# Patient Record
Sex: MALE | Race: WHITE | NOT HISPANIC OR LATINO | URBAN - METROPOLITAN AREA
[De-identification: names, ages, dates, MRNs, and addresses within clinical notes are randomized per-mention and may not be internally consistent; named-entity substitution may affect disease eponyms.]

---

## 2019-05-07 ENCOUNTER — INPATIENT (INPATIENT)
Facility: HOSPITAL | Age: 69
LOS: 16 days | Discharge: HOME | DRG: 228 | End: 2019-05-24
Attending: INTERNAL MEDICINE | Admitting: GENERAL ACUTE CARE HOSPITAL
Payer: MEDICARE

## 2019-05-07 VITALS
RESPIRATION RATE: 18 BRPM | SYSTOLIC BLOOD PRESSURE: 160 MMHG | TEMPERATURE: 97 F | WEIGHT: 207.01 LBS | HEART RATE: 64 BPM | OXYGEN SATURATION: 94 % | DIASTOLIC BLOOD PRESSURE: 82 MMHG | HEIGHT: 72 IN

## 2019-05-07 DIAGNOSIS — C85.90 NON-HODGKIN LYMPHOMA, UNSPECIFIED, UNSPECIFIED SITE: Chronic | ICD-10-CM

## 2019-05-07 LAB
ALBUMIN SERPL ELPH-MCNC: 3.6 G/DL — SIGNIFICANT CHANGE UP (ref 3.3–5)
ALP SERPL-CCNC: 139 U/L — HIGH (ref 40–120)
ALT FLD-CCNC: 14 U/L — SIGNIFICANT CHANGE UP (ref 10–45)
AMMONIA BLD-MCNC: 47 UMOL/L — SIGNIFICANT CHANGE UP (ref 11–55)
ANION GAP SERPL CALC-SCNC: 16 MMOL/L — SIGNIFICANT CHANGE UP (ref 5–17)
APAP SERPL-MCNC: <15 UG/ML — SIGNIFICANT CHANGE UP (ref 10–30)
AST SERPL-CCNC: 36 U/L — SIGNIFICANT CHANGE UP (ref 10–40)
BASOPHILS # BLD AUTO: 0 K/UL — SIGNIFICANT CHANGE UP (ref 0–0.2)
BILIRUB SERPL-MCNC: 0.9 MG/DL — SIGNIFICANT CHANGE UP (ref 0.2–1.2)
BUN SERPL-MCNC: 19 MG/DL — SIGNIFICANT CHANGE UP (ref 7–23)
CALCIUM SERPL-MCNC: 9.4 MG/DL — SIGNIFICANT CHANGE UP (ref 8.4–10.5)
CHLORIDE SERPL-SCNC: 108 MMOL/L — SIGNIFICANT CHANGE UP (ref 96–108)
CO2 SERPL-SCNC: 19 MMOL/L — LOW (ref 22–31)
CREAT SERPL-MCNC: 1.5 MG/DL — HIGH (ref 0.5–1.3)
EOSINOPHIL # BLD AUTO: 0.1 K/UL — SIGNIFICANT CHANGE UP (ref 0–0.5)
ETHANOL SERPL-MCNC: SIGNIFICANT CHANGE UP MG/DL (ref 0–10)
GAS PNL BLDV: SIGNIFICANT CHANGE UP
GLUCOSE SERPL-MCNC: 138 MG/DL — HIGH (ref 70–99)
HCT VFR BLD CALC: 40.3 % — SIGNIFICANT CHANGE UP (ref 39–50)
HGB BLD-MCNC: 12.7 G/DL — LOW (ref 13–17)
LYMPHOCYTES # BLD AUTO: 1.1 K/UL — SIGNIFICANT CHANGE UP (ref 1–3.3)
LYMPHOCYTES # BLD AUTO: 11 % — LOW (ref 13–44)
MAGNESIUM SERPL-MCNC: 1.9 MG/DL — SIGNIFICANT CHANGE UP (ref 1.6–2.6)
MCHC RBC-ENTMCNC: 27.9 PG — SIGNIFICANT CHANGE UP (ref 27–34)
MCHC RBC-ENTMCNC: 31.5 GM/DL — LOW (ref 32–36)
MCV RBC AUTO: 88.7 FL — SIGNIFICANT CHANGE UP (ref 80–100)
MONOCYTES # BLD AUTO: 0.6 K/UL — SIGNIFICANT CHANGE UP (ref 0–0.9)
MONOCYTES NFR BLD AUTO: 4 % — SIGNIFICANT CHANGE UP (ref 2–14)
NEUTROPHILS # BLD AUTO: 7.9 K/UL — HIGH (ref 1.8–7.4)
NEUTROPHILS NFR BLD AUTO: 85 % — HIGH (ref 43–77)
PHOSPHATE SERPL-MCNC: 3.8 MG/DL — SIGNIFICANT CHANGE UP (ref 2.5–4.5)
PLATELET # BLD AUTO: 178 K/UL — SIGNIFICANT CHANGE UP (ref 150–400)
POTASSIUM SERPL-MCNC: 4.9 MMOL/L — SIGNIFICANT CHANGE UP (ref 3.5–5.3)
POTASSIUM SERPL-SCNC: 4.9 MMOL/L — SIGNIFICANT CHANGE UP (ref 3.5–5.3)
PROT SERPL-MCNC: 7.3 G/DL — SIGNIFICANT CHANGE UP (ref 6–8.3)
RBC # BLD: 4.55 M/UL — SIGNIFICANT CHANGE UP (ref 4.2–5.8)
RBC # FLD: 15.4 % — HIGH (ref 10.3–14.5)
SALICYLATES SERPL-MCNC: <2 MG/DL — LOW (ref 15–30)
SODIUM SERPL-SCNC: 143 MMOL/L — SIGNIFICANT CHANGE UP (ref 135–145)
TROPONIN T, HIGH SENSITIVITY RESULT: 56 NG/L — HIGH (ref 0–51)
WBC # BLD: 9.6 K/UL — SIGNIFICANT CHANGE UP (ref 3.8–10.5)
WBC # FLD AUTO: 9.6 K/UL — SIGNIFICANT CHANGE UP (ref 3.8–10.5)

## 2019-05-07 PROCEDURE — 99285 EMERGENCY DEPT VISIT HI MDM: CPT

## 2019-05-07 PROCEDURE — 71046 X-RAY EXAM CHEST 2 VIEWS: CPT | Mod: 26

## 2019-05-07 PROCEDURE — 70450 CT HEAD/BRAIN W/O DYE: CPT | Mod: 26

## 2019-05-07 RX ORDER — THIAMINE MONONITRATE (VIT B1) 100 MG
500 TABLET ORAL ONCE
Qty: 0 | Refills: 0 | Status: COMPLETED | OUTPATIENT
Start: 2019-05-07 | End: 2019-05-07

## 2019-05-07 RX ORDER — MORPHINE SULFATE 50 MG/1
4 CAPSULE, EXTENDED RELEASE ORAL ONCE
Qty: 0 | Refills: 0 | Status: DISCONTINUED | OUTPATIENT
Start: 2019-05-07 | End: 2019-05-07

## 2019-05-07 RX ORDER — LABETALOL HCL 100 MG
200 TABLET ORAL ONCE
Qty: 0 | Refills: 0 | Status: DISCONTINUED | OUTPATIENT
Start: 2019-05-07 | End: 2019-05-07

## 2019-05-07 RX ADMIN — Medication 105 MILLIGRAM(S): at 23:42

## 2019-05-07 NOTE — ED PROVIDER NOTE - PMH
DM type 2 (diabetes mellitus, type 2)    Essential hypertension    Moderate mitral regurgitation    Non-Hodgkins Lymphoma    Pleural effusion    Rhinitis, allergic    Systolic heart failure

## 2019-05-07 NOTE — ED PROVIDER NOTE - PHYSICAL EXAMINATION
Gen: AAOx1 - unable to state date or location - oriented to self, non-toxic  Head: NCAT  HEENT: EOMI, oral mucosa moist, normal conjunctiva  Lung: CTAB, no respiratory distress, no wheezes/rhonchi/rales B/L, speaking in full sentences  CV: RRR, no murmurs, rubs or gallops  Abd: soft, NTND, no guarding  MSK: no visible deformities  Neuro: No focal sensory or motor deficits  Skin: Warm, well perfused, no rash  Psych: normal affect.   ~Massiel Blevins M.D. Resident

## 2019-05-07 NOTE — ED PROVIDER NOTE - NS ED ROS FT
GENERAL: No fever or chills, EYES: no change in vision, HEENT: no trouble swallowing or speaking, CARDIAC: +chest pain, PULMONARY: no cough or SOB, GI: no abdominal pain, no nausea, no vomiting, no diarrhea or constipation, : No changes in urination, SKIN: no rashes, NEURO: no headache,  MSK: No joint pain ~Massiel Blevins M.D. Resident

## 2019-05-07 NOTE — ED PROVIDER NOTE - PROGRESS NOTE DETAILS
call placed to pmd's (dr burton) answering service. awaiting callback. spoke to dr crouch, on-call physician for [employer]. he was given brief signout on patient... states pt was seen by pmd a few weeks ago, had some confusion, but when seen today worse. was noted to have folate deficiency and anemia, but does not believe pt taking meds. unclear if alcohol hx or drug abuse hx. he agrees with workup thus far, will call back when labs and ct result. - Ed Berger MD Massiel Blevins M.D. Resident: Discussed pt with Dr. Loyd, will admit to telemetry and will see pt in morning, deferring heparin drip at this time Massiel Blevins M.D. Resident: Troponin 56-->57-->50, pt with Cr 1.50, discussed with Dr. Proctor, pt admitted to telemetry, no active chest pain

## 2019-05-07 NOTE — ED PROVIDER NOTE - ATTENDING CONTRIBUTION TO CARE
68M, pmh cardiomyopathy, DM II, non-hodgkins lymphoma (2004, relapse 2010), RBBB, biba from pmd's office with ekg changes, confusion. per EMS, pt more confused at PMD's office, which prompted visit to ED. pt unable to provide coherent history, repeatedly changing answers, but he denies any cp, sob, palpitations, lightheadedness, dizziness, or other complaints.    PE: NAD, NCAT, MMM, Trachea midline, Normal conjunctiva, lungs CTAB, S1/S2 RRR, Normal perfusion, 2+ radial pulses bilat, Abdomen Soft, NTND, No rebound/guarding, No LE edema, No deformity of extremities, No rashes,  No focal motor or sensory deficits. CN II-XII intact. Visual fields intact. EOMI, PERRLA. Facial sensation equal bilat. Smile and eye closure equal bilat. Hearing intact bilat. Palate elevation equal, tongue protrusion midline. Lateral head rotation equal bilat. 5/5 strength UE and LE bilat. Sensation grossly intact. No pronator drift. Normal finger to nose. Negative Romberg. Unable to tolerate tandem gait.    VS without sig abnormality. Pt appears well, but unable to provide any coherent history, persistently confabulating answers. Case d/w on-call physician for his pmd, who expressed concern for gradually worsening mental status and confusion. ddx includes but not limited to infection, hepatic encephalopathy, metabolic encephalopathy, cns lesion/bleed, also consider tox cause including wernicke encephalopathy, unclear if heavy etoh use in past. plan to obtain labs, ct head, cxr, ekg, urinalysis, tox screen, pt will require admission for further workup. - Ed Berger MD 68M, pmh cardiomyopathy, DM II, non-hodgkins lymphoma (2004, relapse 2010), RBBB, biba from pmd's office with ekg changes, confusion. per EMS, pt more confused at PMD's office, which prompted visit to ED. pt unable to provide coherent history, repeatedly changing answers, but he denies any cp, sob, palpitations, lightheadedness, dizziness, or other complaints. pt denies current etoh use, states stopped drinking in past but denies hx heavy etoh use. states smokes one cigarette a day. denies drug use.    PE: NAD, NCAT, MMM, Trachea midline, Normal conjunctiva, lungs CTAB, S1/S2 RRR, Normal perfusion, 2+ radial pulses bilat, Abdomen Soft, NTND, No rebound/guarding, No LE edema, No deformity of extremities, No rashes,  No focal motor or sensory deficits. CN II-XII intact. Visual fields intact. EOMI, PERRLA. Facial sensation equal bilat. Smile and eye closure equal bilat. Hearing intact bilat. Palate elevation equal, tongue protrusion midline. Lateral head rotation equal bilat. 5/5 strength UE and LE bilat. Sensation grossly intact. No pronator drift. Normal finger to nose. Negative Romberg. Unable to tolerate tandem gait.    VS without sig abnormality. Pt appears well, but unable to provide any coherent history, persistently confabulating answers. Case d/w on-call physician for his pmd, who expressed concern for gradually worsening mental status and confusion. ddx includes but not limited to infection, hepatic encephalopathy, metabolic encephalopathy, cns lesion/bleed, also consider tox cause including wernicke encephalopathy, unclear if heavy etoh use in past. will give thiamine for possible wernicke encephalopathy, as pt does have confusion and cannot perform tandem gait. plan to obtain labs, ct head, cxr, ekg, urinalysis, tox screen, pt will require admission for further workup. - Ed Berger MD

## 2019-05-07 NOTE — ED PROVIDER NOTE - CLINICAL SUMMARY MEDICAL DECISION MAKING FREE TEXT BOX
69 yo M PMHx cardiomyopathy, DM II, non-hodgkins lymphoma (2004, relapse 2010), RBBB, biba from pmd's office with ekg changes, confusion, DDx: CNS lesion, metabolic disturbance, wernicke's, will check labs, CTH, EKG, admit

## 2019-05-07 NOTE — ED PROVIDER NOTE - OBJECTIVE STATEMENT
67 yo M PMHx cardiomyopathy, DM II, non-hodgkins lymphoma (2004, relapse 2010), RBBB, biba from pmd's office with ekg changes, confusion. Pt unable to provide coherent timeline of events but states was feeling chest pain and SOB at PMD's office. These sx started around noon today. Denies fevers/chills, abd pain, N/V, dysuria.

## 2019-05-08 DIAGNOSIS — I44.2 ATRIOVENTRICULAR BLOCK, COMPLETE: ICD-10-CM

## 2019-05-08 DIAGNOSIS — R94.31 ABNORMAL ELECTROCARDIOGRAM [ECG] [EKG]: ICD-10-CM

## 2019-05-08 DIAGNOSIS — G93.40 ENCEPHALOPATHY, UNSPECIFIED: ICD-10-CM

## 2019-05-08 DIAGNOSIS — E11.22 TYPE 2 DIABETES MELLITUS WITH DIABETIC CHRONIC KIDNEY DISEASE: ICD-10-CM

## 2019-05-08 DIAGNOSIS — M1A.9XX0 CHRONIC GOUT, UNSPECIFIED, WITHOUT TOPHUS (TOPHI): ICD-10-CM

## 2019-05-08 DIAGNOSIS — I50.22 CHRONIC SYSTOLIC (CONGESTIVE) HEART FAILURE: ICD-10-CM

## 2019-05-08 DIAGNOSIS — R41.82 ALTERED MENTAL STATUS, UNSPECIFIED: ICD-10-CM

## 2019-05-08 DIAGNOSIS — C85.91 NON-HODGKIN LYMPHOMA, UNSPECIFIED, LYMPH NODES OF HEAD, FACE, AND NECK: ICD-10-CM

## 2019-05-08 DIAGNOSIS — I10 ESSENTIAL (PRIMARY) HYPERTENSION: ICD-10-CM

## 2019-05-08 LAB
ALBUMIN SERPL ELPH-MCNC: 3.1 G/DL — LOW (ref 3.3–5)
ALBUMIN SERPL ELPH-MCNC: 3.5 G/DL — SIGNIFICANT CHANGE UP (ref 3.3–5)
ALP SERPL-CCNC: 137 U/L — HIGH (ref 40–120)
ALP SERPL-CCNC: 143 U/L — HIGH (ref 40–120)
ALT FLD-CCNC: 10 U/L — SIGNIFICANT CHANGE UP (ref 10–45)
ALT FLD-CCNC: 15 U/L — SIGNIFICANT CHANGE UP (ref 10–45)
ANION GAP SERPL CALC-SCNC: 13 MMOL/L — SIGNIFICANT CHANGE UP (ref 5–17)
ANION GAP SERPL CALC-SCNC: 14 MMOL/L — SIGNIFICANT CHANGE UP (ref 5–17)
ANION GAP SERPL CALC-SCNC: 15 MMOL/L — SIGNIFICANT CHANGE UP (ref 5–17)
APPEARANCE UR: ABNORMAL
APTT BLD: 30 SEC — SIGNIFICANT CHANGE UP (ref 27.5–36.3)
AST SERPL-CCNC: 13 U/L — SIGNIFICANT CHANGE UP (ref 10–40)
AST SERPL-CCNC: 21 U/L — SIGNIFICANT CHANGE UP (ref 10–40)
BACTERIA # UR AUTO: NEGATIVE — SIGNIFICANT CHANGE UP
BASOPHILS # BLD AUTO: 0 K/UL — SIGNIFICANT CHANGE UP (ref 0–0.2)
BASOPHILS NFR BLD AUTO: 0 % — SIGNIFICANT CHANGE UP (ref 0–2)
BILIRUB SERPL-MCNC: 0.7 MG/DL — SIGNIFICANT CHANGE UP (ref 0.2–1.2)
BILIRUB SERPL-MCNC: 0.9 MG/DL — SIGNIFICANT CHANGE UP (ref 0.2–1.2)
BILIRUB UR-MCNC: ABNORMAL
BUN SERPL-MCNC: 18 MG/DL — SIGNIFICANT CHANGE UP (ref 7–23)
BUN SERPL-MCNC: 19 MG/DL — SIGNIFICANT CHANGE UP (ref 7–23)
BUN SERPL-MCNC: 26 MG/DL — HIGH (ref 7–23)
CALCIUM SERPL-MCNC: 8.8 MG/DL — SIGNIFICANT CHANGE UP (ref 8.4–10.5)
CALCIUM SERPL-MCNC: 9.1 MG/DL — SIGNIFICANT CHANGE UP (ref 8.4–10.5)
CALCIUM SERPL-MCNC: 9.3 MG/DL — SIGNIFICANT CHANGE UP (ref 8.4–10.5)
CHLORIDE SERPL-SCNC: 108 MMOL/L — SIGNIFICANT CHANGE UP (ref 96–108)
CHLORIDE SERPL-SCNC: 108 MMOL/L — SIGNIFICANT CHANGE UP (ref 96–108)
CHLORIDE SERPL-SCNC: 109 MMOL/L — HIGH (ref 96–108)
CO2 SERPL-SCNC: 20 MMOL/L — LOW (ref 22–31)
CO2 SERPL-SCNC: 22 MMOL/L — SIGNIFICANT CHANGE UP (ref 22–31)
CO2 SERPL-SCNC: 22 MMOL/L — SIGNIFICANT CHANGE UP (ref 22–31)
COLOR SPEC: YELLOW — SIGNIFICANT CHANGE UP
CREAT SERPL-MCNC: 1.5 MG/DL — HIGH (ref 0.5–1.3)
CREAT SERPL-MCNC: 1.56 MG/DL — HIGH (ref 0.5–1.3)
CREAT SERPL-MCNC: 1.75 MG/DL — HIGH (ref 0.5–1.3)
DIFF PNL FLD: NEGATIVE — SIGNIFICANT CHANGE UP
EOSINOPHIL # BLD AUTO: 0.1 K/UL — SIGNIFICANT CHANGE UP (ref 0–0.5)
EOSINOPHIL NFR BLD AUTO: 1.3 % — SIGNIFICANT CHANGE UP (ref 0–6)
EPI CELLS # UR: 1 /HPF — SIGNIFICANT CHANGE UP
FOLATE SERPL-MCNC: 13 NG/ML — SIGNIFICANT CHANGE UP
GLUCOSE BLDC GLUCOMTR-MCNC: 127 MG/DL — HIGH (ref 70–99)
GLUCOSE BLDC GLUCOMTR-MCNC: 146 MG/DL — HIGH (ref 70–99)
GLUCOSE BLDC GLUCOMTR-MCNC: 161 MG/DL — HIGH (ref 70–99)
GLUCOSE BLDC GLUCOMTR-MCNC: 91 MG/DL — SIGNIFICANT CHANGE UP (ref 70–99)
GLUCOSE SERPL-MCNC: 100 MG/DL — HIGH (ref 70–99)
GLUCOSE SERPL-MCNC: 113 MG/DL — HIGH (ref 70–99)
GLUCOSE SERPL-MCNC: 174 MG/DL — HIGH (ref 70–99)
GLUCOSE UR QL: NEGATIVE — SIGNIFICANT CHANGE UP
HCT VFR BLD CALC: 35.8 % — LOW (ref 39–50)
HCT VFR BLD CALC: 38 % — LOW (ref 39–50)
HGB BLD-MCNC: 11.3 G/DL — LOW (ref 13–17)
HGB BLD-MCNC: 12.3 G/DL — LOW (ref 13–17)
HYALINE CASTS # UR AUTO: 7 /LPF — HIGH (ref 0–2)
INR BLD: 1.58 RATIO — HIGH (ref 0.88–1.16)
IRON SATN MFR SERPL: 27 UG/DL — LOW (ref 45–165)
IRON SATN MFR SERPL: 9 % — LOW (ref 16–55)
KETONES UR-MCNC: NEGATIVE — SIGNIFICANT CHANGE UP
LACTATE SERPL-SCNC: 1.2 MMOL/L — SIGNIFICANT CHANGE UP (ref 0.7–2)
LACTATE SERPL-SCNC: 1.5 MMOL/L — SIGNIFICANT CHANGE UP (ref 0.7–2)
LEUKOCYTE ESTERASE UR-ACNC: NEGATIVE — SIGNIFICANT CHANGE UP
LYMPHOCYTES # BLD AUTO: 0.6 K/UL — LOW (ref 1–3.3)
LYMPHOCYTES # BLD AUTO: 9 % — LOW (ref 13–44)
MAGNESIUM SERPL-MCNC: 2 MG/DL — SIGNIFICANT CHANGE UP (ref 1.6–2.6)
MCHC RBC-ENTMCNC: 28.2 PG — SIGNIFICANT CHANGE UP (ref 27–34)
MCHC RBC-ENTMCNC: 28.9 PG — SIGNIFICANT CHANGE UP (ref 27–34)
MCHC RBC-ENTMCNC: 31.6 GM/DL — LOW (ref 32–36)
MCHC RBC-ENTMCNC: 32.3 GM/DL — SIGNIFICANT CHANGE UP (ref 32–36)
MCV RBC AUTO: 89.3 FL — SIGNIFICANT CHANGE UP (ref 80–100)
MCV RBC AUTO: 89.7 FL — SIGNIFICANT CHANGE UP (ref 80–100)
MONOCYTES # BLD AUTO: 0.3 K/UL — SIGNIFICANT CHANGE UP (ref 0–0.9)
MONOCYTES NFR BLD AUTO: 5.3 % — SIGNIFICANT CHANGE UP (ref 2–14)
NEUTROPHILS # BLD AUTO: 5.4 K/UL — SIGNIFICANT CHANGE UP (ref 1.8–7.4)
NEUTROPHILS NFR BLD AUTO: 84.4 % — HIGH (ref 43–77)
NITRITE UR-MCNC: NEGATIVE — SIGNIFICANT CHANGE UP
PCP SPEC-MCNC: SIGNIFICANT CHANGE UP
PH UR: 5.5 — SIGNIFICANT CHANGE UP (ref 5–8)
PHOSPHATE SERPL-MCNC: 4.3 MG/DL — SIGNIFICANT CHANGE UP (ref 2.5–4.5)
PLATELET # BLD AUTO: 160 K/UL — SIGNIFICANT CHANGE UP (ref 150–400)
PLATELET # BLD AUTO: 163 K/UL — SIGNIFICANT CHANGE UP (ref 150–400)
POTASSIUM SERPL-MCNC: 3.5 MMOL/L — SIGNIFICANT CHANGE UP (ref 3.5–5.3)
POTASSIUM SERPL-MCNC: 3.6 MMOL/L — SIGNIFICANT CHANGE UP (ref 3.5–5.3)
POTASSIUM SERPL-MCNC: 4.2 MMOL/L — SIGNIFICANT CHANGE UP (ref 3.5–5.3)
POTASSIUM SERPL-SCNC: 3.5 MMOL/L — SIGNIFICANT CHANGE UP (ref 3.5–5.3)
POTASSIUM SERPL-SCNC: 3.6 MMOL/L — SIGNIFICANT CHANGE UP (ref 3.5–5.3)
POTASSIUM SERPL-SCNC: 4.2 MMOL/L — SIGNIFICANT CHANGE UP (ref 3.5–5.3)
PROT SERPL-MCNC: 6.4 G/DL — SIGNIFICANT CHANGE UP (ref 6–8.3)
PROT SERPL-MCNC: 7.3 G/DL — SIGNIFICANT CHANGE UP (ref 6–8.3)
PROT UR-MCNC: ABNORMAL
PROTHROM AB SERPL-ACNC: 18.4 SEC — HIGH (ref 10–12.9)
RBC # BLD: 4.01 M/UL — LOW (ref 4.2–5.8)
RBC # BLD: 4.24 M/UL — SIGNIFICANT CHANGE UP (ref 4.2–5.8)
RBC # FLD: 15.2 % — HIGH (ref 10.3–14.5)
RBC # FLD: 15.4 % — HIGH (ref 10.3–14.5)
RBC CASTS # UR COMP ASSIST: 2 /HPF — SIGNIFICANT CHANGE UP (ref 0–4)
SODIUM SERPL-SCNC: 143 MMOL/L — SIGNIFICANT CHANGE UP (ref 135–145)
SODIUM SERPL-SCNC: 144 MMOL/L — SIGNIFICANT CHANGE UP (ref 135–145)
SODIUM SERPL-SCNC: 144 MMOL/L — SIGNIFICANT CHANGE UP (ref 135–145)
SP GR SPEC: 1.02 — SIGNIFICANT CHANGE UP (ref 1.01–1.02)
T PALLIDUM AB TITR SER: NEGATIVE — SIGNIFICANT CHANGE UP
TIBC SERPL-MCNC: 311 UG/DL — SIGNIFICANT CHANGE UP (ref 220–430)
TROPONIN T, HIGH SENSITIVITY RESULT: 50 NG/L — SIGNIFICANT CHANGE UP (ref 0–51)
TSH SERPL-MCNC: 1.08 UIU/ML — SIGNIFICANT CHANGE UP (ref 0.27–4.2)
UIBC SERPL-MCNC: 284 UG/DL — SIGNIFICANT CHANGE UP (ref 110–370)
UROBILINOGEN FLD QL: ABNORMAL
VIT B12 SERPL-MCNC: 625 PG/ML — SIGNIFICANT CHANGE UP (ref 232–1245)
WBC # BLD: 6.5 K/UL — SIGNIFICANT CHANGE UP (ref 3.8–10.5)
WBC # BLD: 7.5 K/UL — SIGNIFICANT CHANGE UP (ref 3.8–10.5)
WBC # FLD AUTO: 6.5 K/UL — SIGNIFICANT CHANGE UP (ref 3.8–10.5)
WBC # FLD AUTO: 7.5 K/UL — SIGNIFICANT CHANGE UP (ref 3.8–10.5)
WBC UR QL: 7 /HPF — HIGH (ref 0–5)

## 2019-05-08 PROCEDURE — 93306 TTE W/DOPPLER COMPLETE: CPT | Mod: 26

## 2019-05-08 PROCEDURE — 99223 1ST HOSP IP/OBS HIGH 75: CPT | Mod: GC

## 2019-05-08 PROCEDURE — 70551 MRI BRAIN STEM W/O DYE: CPT | Mod: 26

## 2019-05-08 PROCEDURE — 93010 ELECTROCARDIOGRAM REPORT: CPT

## 2019-05-08 PROCEDURE — 99232 SBSQ HOSP IP/OBS MODERATE 35: CPT

## 2019-05-08 PROCEDURE — 99223 1ST HOSP IP/OBS HIGH 75: CPT

## 2019-05-08 RX ORDER — ASPIRIN/CALCIUM CARB/MAGNESIUM 324 MG
81 TABLET ORAL DAILY
Qty: 0 | Refills: 0 | Status: DISCONTINUED | OUTPATIENT
Start: 2019-05-08 | End: 2019-05-10

## 2019-05-08 RX ORDER — HALOPERIDOL DECANOATE 100 MG/ML
5 INJECTION INTRAMUSCULAR ONCE
Qty: 0 | Refills: 0 | Status: COMPLETED | OUTPATIENT
Start: 2019-05-08 | End: 2019-05-08

## 2019-05-08 RX ORDER — ONDANSETRON 8 MG/1
8 TABLET, FILM COATED ORAL
Qty: 0 | Refills: 0 | Status: DISCONTINUED | OUTPATIENT
Start: 2019-05-08 | End: 2019-05-09

## 2019-05-08 RX ORDER — FOLIC ACID 0.8 MG
1 TABLET ORAL DAILY
Qty: 0 | Refills: 0 | Status: DISCONTINUED | OUTPATIENT
Start: 2019-05-08 | End: 2019-05-24

## 2019-05-08 RX ORDER — ALLOPURINOL 300 MG
300 TABLET ORAL DAILY
Qty: 0 | Refills: 0 | Status: DISCONTINUED | OUTPATIENT
Start: 2019-05-08 | End: 2019-05-24

## 2019-05-08 RX ORDER — METOPROLOL TARTRATE 50 MG
25 TABLET ORAL DAILY
Qty: 0 | Refills: 0 | Status: DISCONTINUED | OUTPATIENT
Start: 2019-05-08 | End: 2019-05-08

## 2019-05-08 RX ORDER — MAGNESIUM SULFATE 500 MG/ML
2 VIAL (ML) INJECTION ONCE
Qty: 0 | Refills: 0 | Status: COMPLETED | OUTPATIENT
Start: 2019-05-08 | End: 2019-05-08

## 2019-05-08 RX ORDER — DEXTROSE 50 % IN WATER 50 %
15 SYRINGE (ML) INTRAVENOUS ONCE
Qty: 0 | Refills: 0 | Status: DISCONTINUED | OUTPATIENT
Start: 2019-05-08 | End: 2019-05-24

## 2019-05-08 RX ORDER — FUROSEMIDE 40 MG
20 TABLET ORAL
Qty: 0 | Refills: 0 | Status: DISCONTINUED | OUTPATIENT
Start: 2019-05-08 | End: 2019-05-09

## 2019-05-08 RX ORDER — GLUCAGON INJECTION, SOLUTION 0.5 MG/.1ML
1 INJECTION, SOLUTION SUBCUTANEOUS ONCE
Qty: 0 | Refills: 0 | Status: DISCONTINUED | OUTPATIENT
Start: 2019-05-08 | End: 2019-05-24

## 2019-05-08 RX ORDER — DEXTROSE 50 % IN WATER 50 %
12.5 SYRINGE (ML) INTRAVENOUS ONCE
Qty: 0 | Refills: 0 | Status: DISCONTINUED | OUTPATIENT
Start: 2019-05-08 | End: 2019-05-24

## 2019-05-08 RX ORDER — SODIUM CHLORIDE 9 MG/ML
1000 INJECTION, SOLUTION INTRAVENOUS
Qty: 0 | Refills: 0 | Status: DISCONTINUED | OUTPATIENT
Start: 2019-05-08 | End: 2019-05-24

## 2019-05-08 RX ORDER — HEPARIN SODIUM 5000 [USP'U]/ML
5000 INJECTION INTRAVENOUS; SUBCUTANEOUS EVERY 12 HOURS
Qty: 0 | Refills: 0 | Status: DISCONTINUED | OUTPATIENT
Start: 2019-05-08 | End: 2019-05-12

## 2019-05-08 RX ORDER — LOSARTAN POTASSIUM 100 MG/1
50 TABLET, FILM COATED ORAL DAILY
Qty: 0 | Refills: 0 | Status: DISCONTINUED | OUTPATIENT
Start: 2019-05-08 | End: 2019-05-08

## 2019-05-08 RX ORDER — POTASSIUM CHLORIDE 20 MEQ
20 PACKET (EA) ORAL ONCE
Qty: 0 | Refills: 0 | Status: COMPLETED | OUTPATIENT
Start: 2019-05-08 | End: 2019-05-08

## 2019-05-08 RX ORDER — SPIRONOLACTONE 25 MG/1
25 TABLET, FILM COATED ORAL DAILY
Qty: 0 | Refills: 0 | Status: DISCONTINUED | OUTPATIENT
Start: 2019-05-08 | End: 2019-05-09

## 2019-05-08 RX ORDER — INSULIN LISPRO 100/ML
VIAL (ML) SUBCUTANEOUS
Qty: 0 | Refills: 0 | Status: DISCONTINUED | OUTPATIENT
Start: 2019-05-08 | End: 2019-05-24

## 2019-05-08 RX ADMIN — HEPARIN SODIUM 5000 UNIT(S): 5000 INJECTION INTRAVENOUS; SUBCUTANEOUS at 19:37

## 2019-05-08 RX ADMIN — LOSARTAN POTASSIUM 50 MILLIGRAM(S): 100 TABLET, FILM COATED ORAL at 15:27

## 2019-05-08 RX ADMIN — SPIRONOLACTONE 25 MILLIGRAM(S): 25 TABLET, FILM COATED ORAL at 13:57

## 2019-05-08 RX ADMIN — Medication 50 GRAM(S): at 23:03

## 2019-05-08 RX ADMIN — Medication 50 MILLIEQUIVALENT(S): at 23:38

## 2019-05-08 RX ADMIN — HALOPERIDOL DECANOATE 5 MILLIGRAM(S): 100 INJECTION INTRAMUSCULAR at 22:00

## 2019-05-08 RX ADMIN — Medication 300 MILLIGRAM(S): at 13:57

## 2019-05-08 RX ADMIN — HALOPERIDOL DECANOATE 5 MILLIGRAM(S): 100 INJECTION INTRAMUSCULAR at 21:50

## 2019-05-08 RX ADMIN — Medication 25 MILLIGRAM(S): at 13:57

## 2019-05-08 RX ADMIN — Medication 1: at 13:23

## 2019-05-08 RX ADMIN — Medication 20 MILLIGRAM(S): at 19:37

## 2019-05-08 RX ADMIN — Medication 2 MILLIGRAM(S): at 22:15

## 2019-05-08 RX ADMIN — Medication 81 MILLIGRAM(S): at 13:57

## 2019-05-08 NOTE — CONSULT NOTE ADULT - PROBLEM SELECTOR RECOMMENDATION 3
- MRI of brain pending  -consider neurology evaluation - MRI of brain pending  -consider neurology evaluation    -Plan discussed with pt/Dr. Nelson/floor NP.

## 2019-05-08 NOTE — H&P ADULT - ATTENDING COMMENTS
dvt ppx: start lovenox 40 mg daily  communication: I have left a message for the patients daughter- awaiting a call back    signed out to [facility] NP at [facility] [id number] dvt ppx: start lovenox 40 mg daily  communication: I have discussed the case with patients daughter, she states he has been having confusion that's been worsening for the last 2-3 months.     signed out to [facility] HAMZAH at [facility] [id number]

## 2019-05-08 NOTE — H&P ADULT - PROBLEM SELECTOR PLAN 2
- etiology unclear  - patient has not been taking his medications. Also, noted to have chronic parietal infarct in CT head  - will get MR brain to r/o stroke  - will get vitb12, folate, iron panel, rpr and tsh levels stat  - no signs of infection, no new meds, no hypoxia, no seizure like activity, no CHF exacerbation, no hepatic failure, renal function stable - etiology unclear  - patient has not been taking his medications. Also, noted to have chronic parietal infarct in CT head  - will get MR brain to r/o stroke  - will get vitb12, folate, iron panel, rpr and tsh levels stat  - no signs of infection, no new meds, no hypoxia, no seizure like activity, no CHF exacerbation, no hepatic failure, renal function stable  - if no more information is obtained from the above testing, will need to consult neurology. Consider early dementia?

## 2019-05-08 NOTE — CONSULT NOTE ADULT - SUBJECTIVE AND OBJECTIVE BOX
HPI:  ** Patient poor historian **    Patient is a 68 year old Non-Hodgkin's Lymphoma tx with chemotherapy in , DM2 with CKD stage 3, HTN, CHF EF 35% on cardiac cath 2016, pleural effusion s/p left pleuracentesis in 10/2016, cardiomyopathy, gout and HLD presents to the ED sent in from cardiologists office because of EKG changes. Patient is not sure why he is here. He was not sure where he was, why he is here or what even the year is. He remarks that he retired a few months ago and he stopped taking his medications then because he "wasn't feeling good". He states he was wife his wife earlier but then later he states shes in [place]. Patient currently denies chest pain, shortness of breath, palpitations, syncope, fevers, chills, recent travel or sick contacts. No new meds however he has stopped taking most of his meds. He overall, feels fine and is not sure why he is in the hospital.     It's very difficult to get a clear history from patient. I have tried to reach his daughter but have not received a call back as of yet. I have called [facility] patients pharmacy and he states patient picked up furosemide and irbersartan in April but has not picked up any other meds in months. Mentation in 2016 AAOX3.    In the ED, VSS. Labs at baseline, CT head with old stroke, Trops elevated and peaked at 50, now normal, EKG with TWI in lateral leads. (08 May 2019 10:33)      PAST MEDICAL & SURGICAL HISTORY:  Pleural effusion  Moderate mitral regurgitation  Systolic heart failure  Rhinitis, allergic  Essential hypertension  DM type 2 (diabetes mellitus, type 2)  Non-Hodgkins Lymphoma  Non-Hodgkin lymphoma: h/o axillary dissection      FAMILY HISTORY:  Family history of non-Hodgkin's lymphoma (Sibling)  Family history of CHF (congestive heart failure)      SOCIAL HISTORY:  Allergies    No Known Allergies    Intolerances      MEDICATIONS  (STANDING):  allopurinol 300 milliGRAM(s) Oral daily  aspirin enteric coated 81 milliGRAM(s) Oral daily  dextrose 5%. 1000 milliLiter(s) (50 mL/Hr) IV Continuous <Continuous>  dextrose 50% Injectable 12.5 Gram(s) IV Push once  furosemide    Tablet 20 milliGRAM(s) Oral two times a day  heparin  Injectable 5000 Unit(s) SubCutaneous every 12 hours  insulin lispro (HumaLOG) corrective regimen sliding scale   SubCutaneous three times a day before meals  losartan 50 milliGRAM(s) Oral daily  metoprolol succinate ER 25 milliGRAM(s) Oral daily  spironolactone 25 milliGRAM(s) Oral daily    MEDICATIONS  (PRN):  dextrose 40% Gel 15 Gram(s) Oral once PRN Blood Glucose LESS THAN 70 milliGRAM(s)/deciliter  glucagon  Injectable 1 milliGRAM(s) IntraMuscular once PRN Glucose LESS THAN 70 milligrams/deciliter    ROS:  General: Pt denies recent weight loss/fever/chills    Neurological: denies numbness or  sensation loss    Cardiovascular: denies chest pain/palpitations/leg edema    Respiratory and Thorax: denies SOB/cough/wheezing    Gastrointestinal: denies abdominal pain/diarrhea/constipation/bloody stool    Genitourinary: denies urinary frequency/urgency/ dysuria    Musculoskeletal: denies joint pain or swelling, denies restricted motion    Hematologic: denies abnormal bleeding  	    	  	    		        	    	          Physical Exam:  Vital Signs Last 24 Hrs  T(C): 36.4 (08 May 2019 03:59), Max: 36.6 (08 May 2019 00:00)  T(F): 97.6 (08 May 2019 03:59), Max: 97.9 (08 May 2019 00:00)  HR: 55 (08 May 2019 03:59) (55 - 64)  BP: 167/79 (08 May 2019 03:59) (160/82 - 167/79)  BP(mean): --  RR: 18 (08 May 2019 03:59) (17 - 18)  SpO2: 99% (08 May 2019 03:59) (94% - 99%)  Vital Signs : BP        HR       RR                 Constitutional: well developed, well nourished, no deformities and no acute distress    Neurological: Alert & Oriented x 3, VAUGHAN, no focal deficits    HEENT: NC/AT, PERRLA, EOMI,  Neck supple.    Respiratory: CTA B/L, No wheezing/crackles/rhonchi    Cardiovascular: (+) S1 & S2, RRR, No m/r/g    Gastrointestinal: soft, NT, nondistended, (+) BS    Genitourinary: non distended bladder, voiding freely    Extremities: No pedal edema, No clubbing, No cyanosis    Skin:  normal skin color and pigmentation, no skin lesions          LABS:                        12.3   7.5   )-----------( 160      ( 08 May 2019 06:02 )             38.0     05-08    144  |  108  |  19  ----------------------------<  100<H>  3.5   |  22  |  1.50<H>    Ca    9.1      08 May 2019 06:00  Phos  3.8     05-07  Mg     1.9     -07    TPro  7.3  /  Alb  3.5  /  TBili  0.9  /  DBili  x   /  AST  21  /  ALT  15  /  AlkPhos  137<H>  05-07      Urinalysis Basic - ( 08 May 2019 00:17 )    Color: Yellow / Appearance: Slightly Turbid / S.023 / pH: x  Gluc: x / Ketone: Negative  / Bili: Small / Urobili: 3 mg/dL   Blood: x / Protein: 300 mg/dL / Nitrite: Negative   Leuk Esterase: Negative / RBC: 2 /hpf / WBC 7 /HPF   Sq Epi: x / Non Sq Epi: 1 /hpf / Bacteria: Negative        RADIOLOGY & ADDITIONAL STUDIES:    TELE:  EKG:  CXR:  echo: HPI:  ** Patient poor historian **    Patient is a 68 year old Non-Hodgkin's Lymphoma tx with chemotherapy in , DM2 with CKD stage 3, HTN, CHF EF 35% on cardiac cath 2016, pleural effusion s/p left thoracentesis in 10/2016, Non ischemic cardiomyopathy, gout and HLD presents to the ED sent in from cardiologists office because of EKG changes. Patient is not sure why he is here. He was not sure where he was, why he is here or what even the year is. He remarks that he retired a few months ago and he stopped taking his medications then because he "wasn't feeling good". He states he was wife his wife earlier but then later he states shes in [place]. Patient currently denies chest pain, shortness of breath, palpitations, syncope, fevers, chills, recent travel or sick contacts. No new meds however he has stopped taking most of his meds. He overall, feels fine and is not sure why he is in the hospital.     In the ED, VSS. Labs at baseline, CT head with old stroke, Trops elevated and peaked at 50, now normal, EKG with TWI in lateral leads.   Pt had echo (2016) EF 44%  Cardica cath (Dec 2016) non obstructive CAD    EP consult requested for abnormal EKG.      PAST MEDICAL & SURGICAL HISTORY:  Pleural effusion  Moderate mitral regurgitation  Systolic heart failure  Rhinitis, allergic  Essential hypertension  DM type 2 (diabetes mellitus, type 2)  Non-Hodgkins Lymphoma  Non-Hodgkin lymphoma: h/o axillary dissection  Non ischemic cardiomyopathy      FAMILY HISTORY:  Family history of non-Hodgkin's lymphoma (Sibling)  Family history of CHF (congestive heart failure)      SOCIAL HISTORY:  Allergies    No Known Allergies      MEDICATIONS  (STANDING):  allopurinol 300 milliGRAM(s) Oral daily  aspirin enteric coated 81 milliGRAM(s) Oral daily  dextrose 5%. 1000 milliLiter(s) (50 mL/Hr) IV Continuous <Continuous>  dextrose 50% Injectable 12.5 Gram(s) IV Push once  furosemide    Tablet 20 milliGRAM(s) Oral two times a day  heparin  Injectable 5000 Unit(s) SubCutaneous every 12 hours  insulin lispro (HumaLOG) corrective regimen sliding scale   SubCutaneous three times a day before meals  losartan 50 milliGRAM(s) Oral daily  metoprolol succinate ER 25 milliGRAM(s) Oral daily  spironolactone 25 milliGRAM(s) Oral daily    MEDICATIONS  (PRN):  dextrose 40% Gel 15 Gram(s) Oral once PRN Blood Glucose LESS THAN 70 milliGRAM(s)/deciliter  glucagon  Injectable 1 milliGRAM(s) IntraMuscular once PRN Glucose LESS THAN 70 milligrams/deciliter    ROS:    General: Pt denies recent weight loss/fever/chills    Neurological: denies numbness or  sensation loss, states 'has recent memory problem'    Cardiovascular: denies chest pain/palpitations/leg edema    Respiratory and Thorax: denies SOB/cough/wheezing    Gastrointestinal: denies abdominal pain/diarrhea/constipation/bloody stool    Genitourinary: denies urinary frequency/urgency/ dysuria    Musculoskeletal: denies joint pain or swelling, denies restricted motion    Hematologic: denies abnormal bleeding  	    Physical Exam:  Vital Signs Last 24 Hrs  T(C): 36.4 (08 May 2019 03:59), Max: 36.6 (08 May 2019 00:00)  T(F): 97.6 (08 May 2019 03:59), Max: 97.9 (08 May 2019 00:00)  HR: 55 (08 May 2019 03:59) (55 - 64)  BP: 167/79 (08 May 2019 03:59) (160/82 - 167/79)  RR: 18 (08 May 2019 03:59) (17 - 18)  SpO2: 99% (08 May 2019 03:59) (94% - 99%)                Constitutional: well developed, well nourished, no deformities and no acute distress    Neurological: Alert & Oriented x 1 (person only) VAUGHAN, no focal deficits    HEENT: NC/AT, PERRLA, EOMI,  Neck supple.    Respiratory: CTA B/L, No wheezing/crackles/rhonchi    Cardiovascular: (+) S1 & S2, RRR, No m/r/g    Gastrointestinal: soft, NT, nondistended, (+) BS    Genitourinary: non distended bladder, voiding freely    Extremities: No pedal edema, No clubbing, No cyanosis    Skin:  normal skin color and pigmentation, no skin lesions    LABS:                        12.3   7.5   )-----------( 160      ( 08 May 2019 06:02 )             38.0     05-08    144  |  108  |  19  ----------------------------<  100<H>  3.5   |  22  |  1.50<H>    Ca    9.1      08 May 2019 06:00  Phos  3.8     05-07  Mg     1.9     05-07    TPro  7.3  /  Alb  3.5  /  TBili  0.9  /  DBili  x   /  AST  21  /  ALT  15  /  AlkPhos  137<H>  05-07      Urinalysis Basic - ( 08 May 2019 00:17 )    Color: Yellow / Appearance: Slightly Turbid / S.023 / pH: x  Gluc: x / Ketone: Negative  / Bili: Small / Urobili: 3 mg/dL   Blood: x / Protein: 300 mg/dL / Nitrite: Negative   Leuk Esterase: Negative / RBC: 2 /hpf / WBC 7 /HPF   Sq Epi: x / Non Sq Epi: 1 /hpf / Bacteria: Negative    RADIOLOGY & ADDITIONAL STUDIES:    TELE:   EKG:  CXR: stable Lt pleural effusion & Rt loculated intra fistular fluids  echo: pending HPI:  ** Patient poor historian **    Patient is a 68 year old Non-Hodgkin's Lymphoma tx with chemotherapy in , DM2 with CKD stage 3, HTN, CHF EF 35% on cardiac cath 2016, pleural effusion s/p left thoracentesis in 10/2016, Non ischemic cardiomyopathy, gout and HLD presents to the ED sent in from cardiologists office because of EKG changes. Patient is not sure why he is here. He was not sure where he was, why he is here or what even the year is. He remarks that he retired a few months ago and he stopped taking his medications then because he "wasn't feeling good". He states he was wife his wife earlier but then later he states shes in [place]. Patient currently denies chest pain, shortness of breath, palpitations, syncope, fevers, chills, recent travel or sick contacts. No new meds however he has stopped taking most of his meds. He overall, feels fine and is not sure why he is in the hospital.     In the ED, VSS. Labs at baseline, CT head with old stroke, Trops elevated and peaked at 50, now normal, EKG with TWI in lateral leads.   Pt had echo (2016) EF 44%  Cardica cath (Dec 2016) non obstructive CAD    Pt denies h/o syncope, but had episodes of 'feeling dizzy & weak' while walking in the parking lot which relieved by rest for few minutes about 3 weeks ago.    EP consult requested for abnormal EKG.  12EKG & tele strip showed complete heart block with alternating RBBB with LBBB  with HR 50's bpm. BP stable.      PAST MEDICAL & SURGICAL HISTORY:  Pleural effusion  Moderate mitral regurgitation  Systolic heart failure  Rhinitis, allergic  Essential hypertension  DM type 2 (diabetes mellitus, type 2)  Non-Hodgkins Lymphoma  Non-Hodgkin lymphoma: h/o axillary dissection  Non ischemic cardiomyopathy      FAMILY HISTORY:  Family history of non-Hodgkin's lymphoma (Sibling)  Family history of CHF (congestive heart failure)      SOCIAL HISTORY:  Allergies    No Known Allergies      MEDICATIONS  (STANDING):  allopurinol 300 milliGRAM(s) Oral daily  aspirin enteric coated 81 milliGRAM(s) Oral daily  dextrose 5%. 1000 milliLiter(s) (50 mL/Hr) IV Continuous <Continuous>  dextrose 50% Injectable 12.5 Gram(s) IV Push once  furosemide    Tablet 20 milliGRAM(s) Oral two times a day  heparin  Injectable 5000 Unit(s) SubCutaneous every 12 hours  insulin lispro (HumaLOG) corrective regimen sliding scale   SubCutaneous three times a day before meals  losartan 50 milliGRAM(s) Oral daily  metoprolol succinate ER 25 milliGRAM(s) Oral daily  spironolactone 25 milliGRAM(s) Oral daily    MEDICATIONS  (PRN):  dextrose 40% Gel 15 Gram(s) Oral once PRN Blood Glucose LESS THAN 70 milliGRAM(s)/deciliter  glucagon  Injectable 1 milliGRAM(s) IntraMuscular once PRN Glucose LESS THAN 70 milligrams/deciliter    ROS:    General: Pt denies recent weight loss/fever/chills    Neurological: denies numbness or  sensation loss, states 'has recent memory problem'    Cardiovascular: denies chest pain/palpitations/leg edema    Respiratory and Thorax: denies SOB/cough/wheezing    Gastrointestinal: denies abdominal pain/diarrhea/constipation/bloody stool    Genitourinary: denies urinary frequency/urgency/ dysuria    Musculoskeletal: denies joint pain or swelling, denies restricted motion    Hematologic: denies abnormal bleeding  	    Physical Exam:  Vital Signs Last 24 Hrs  T(C): 36.4 (08 May 2019 03:59), Max: 36.6 (08 May 2019 00:00)  T(F): 97.6 (08 May 2019 03:59), Max: 97.9 (08 May 2019 00:00)  HR: 55 (08 May 2019 03:59) (55 - 64)  BP: 167/79 (08 May 2019 03:59) (160/82 - 167/79)  RR: 18 (08 May 2019 03:59) (17 - 18)  SpO2: 99% (08 May 2019 03:59) (94% - 99%)                Constitutional: well developed, well nourished, no deformities and no acute distress    Neurological: Alert & Oriented x 1 (person only) VAUGHAN, no focal deficits, following commands appropriately.    HEENT: NC/AT, PERRLA, EOMI,  Neck supple.    Respiratory: CTA B/L, No wheezing/crackles/rhonchi    Cardiovascular: (+) S1 & S2, RRR, No m/r/g    Gastrointestinal: soft, NT, nondistended, (+) BS    Genitourinary: non distended bladder, voiding freely    Extremities: No pedal edema, No clubbing, No cyanosis    Skin:  normal skin color and pigmentation, no skin lesions    LABS:                        12.3   7.5   )-----------( 160      ( 08 May 2019 06:02 )             38.0         144  |  108  |  19  ----------------------------<  100<H>  3.5   |  22  |  1.50<H>    Ca    9.1      08 May 2019 06:00  Phos  3.8     -  Mg     1.9         TPro  7.3  /  Alb  3.5  /  TBili  0.9  /  DBili  x   /  AST  21  /  ALT  15  /  AlkPhos  137<H>  07      Urinalysis Basic - ( 08 May 2019 00:17 )    Color: Yellow / Appearance: Slightly Turbid / S.023 / pH: x  Gluc: x / Ketone: Negative  / Bili: Small / Urobili: 3 mg/dL   Blood: x / Protein: 300 mg/dL / Nitrite: Negative   Leuk Esterase: Negative / RBC: 2 /hpf / WBC 7 /HPF   Sq Epi: x / Non Sq Epi: 1 /hpf / Bacteria: Negative    RADIOLOGY & ADDITIONAL STUDIES:    TELE: complete heart block with alternating RBBB with LBBB  with HR 50's bpm.  EKG: Sinus rhythm with complete heart block / RBBB/ HR 59 bpm  CXR: stable Lt pleural effusion & Rt loculated intra fistular fluids  echo: pending

## 2019-05-08 NOTE — CONSULT NOTE ADULT - SUBJECTIVE AND OBJECTIVE BOX
Patient is a 68y old  Male who presents with a chief complaint of confusion (08 May 2019 12:11)      HPI:  ** Patient poor historian **    Patient is a 68 year old Non-Hodgkin's Lymphoma tx with chemotherapy in , DM2 with CKD stage 3, HTN, CHF EF 35% on cardiac cath 2016, pleural effusion s/p left pleuracentesis in 10/2016, cardiomyopathy, gout and HLD presents to the ED sent in from cardiologists office because of EKG changes. Patient is not sure why he is here. He was not sure where he was, why he is here or what even the year is. He remarks that he retired a few months ago and he stopped taking his medications then because he "wasn't feeling good". He states he was wife his wife earlier but then later he states shes in [place]. Patient currently denies chest pain, shortness of breath, palpitations, syncope, fevers, chills, recent travel or sick contacts. No new meds however he has stopped taking most of his meds. He overall, feels fine and is not sure why he is in the hospital.     It's very difficult to get a clear history from patient. I have tried to reach his daughter but have not received a call back as of yet. I have called [facility] patients pharmacy and he states patient picked up furosemide and irbersartan in April but has not picked up any other meds in months. Mentation in  AAOX3.    In the ED, VSS. Labs at baseline, CT head with old stroke, Trops elevated and peaked at 50, now normal, EKG with TWI in lateral leads. (08 May 2019 10:33)    5 as the above record indicates, the pt is here and is a very poor historian. I was asked by Dr Hawkins from oncology and by the pt's internist to see the pt as he did have a past of lymphoma. The oncology office will have to look up records as his history is not readily available. When I came to see the pt he was not able to supply much info about his cancer other than he had lymphoma and did not know the type or who treated him. Dr Hawkins thinks he may have been treated by his previous associate, Dr Sarmiento.     At the time of my visit the pt was alert and oriented but was not able to give specific details about any of his medical issues. His chemistries appeared all unremarkable and he was not febrile and his ct of the head showed nothing of note.  I will see if there are any records on his previous onc history and see if there is any reason to think it may have contributed to his present admit here.      MEDICATIONS  (STANDING):  allopurinol 300 milliGRAM(s) Oral daily  aspirin enteric coated 81 milliGRAM(s) Oral daily  dextrose 5%. 1000 milliLiter(s) (50 mL/Hr) IV Continuous <Continuous>  dextrose 50% Injectable 12.5 Gram(s) IV Push once  furosemide    Tablet 20 milliGRAM(s) Oral two times a day  heparin  Injectable 5000 Unit(s) SubCutaneous every 12 hours  insulin lispro (HumaLOG) corrective regimen sliding scale   SubCutaneous three times a day before meals  losartan 50 milliGRAM(s) Oral daily  metoprolol succinate ER 25 milliGRAM(s) Oral daily  spironolactone 25 milliGRAM(s) Oral daily    MEDICATIONS  (PRN):  dextrose 40% Gel 15 Gram(s) Oral once PRN Blood Glucose LESS THAN 70 milliGRAM(s)/deciliter  glucagon  Injectable 1 milliGRAM(s) IntraMuscular once PRN Glucose LESS THAN 70 milligrams/deciliter      T(F): 97.6 (19 @ 03:59), Max: 97.9 (19 @ 00:00)  HR: 55 (19 @ 03:59) (55 - 64)  BP: 167/79 (19 @ 03:59) (160/82 - 167/79)  RR: 18 (19 @ 03:59) (17 - 18)  SpO2: 99% (19 @ 03:59) (94% - 99%)    LABS:    CBC Full  -  ( 08 May 2019 06:02 )  WBC Count : 7.5 K/uL  RBC Count : 4.24 M/uL  Hemoglobin : 12.3 g/dL  Hematocrit : 38.0 %  Platelet Count - Automated : 160 K/uL  Mean Cell Volume : 89.7 fl  Mean Cell Hemoglobin : 28.9 pg  Mean Cell Hemoglobin Concentration : 32.3 gm/dL  Auto Neutrophil # : x  Auto Lymphocyte # : x  Auto Monocyte # : x  Auto Eosinophil # : x  Auto Basophil # : x  Auto Neutrophil % : x  Auto Lymphocyte % : x  Auto Monocyte % : x  Auto Eosinophil % : x  Auto Basophil % : x        144  |  108  |  19  ----------------------------<  100<H>  3.5   |  22  |  1.50<H>    Ca    9.1      08 May 2019 06:00  Phos  3.8     05-07  Mg     1.9     05-07    TPro  7.3  /  Alb  3.5  /  TBili  0.9  /  DBili  x   /  AST  21  /  ALT  15  /  AlkPhos  137<H>  05-07      Urinalysis Basic - ( 08 May 2019 00:17 )    Color: Yellow / Appearance: Slightly Turbid / S.023 / pH: x  Gluc: x / Ketone: Negative  / Bili: Small / Urobili: 3 mg/dL   Blood: x / Protein: 300 mg/dL / Nitrite: Negative   Leuk Esterase: Negative / RBC: 2 /hpf / WBC 7 /HPF   Sq Epi: x / Non Sq Epi: 1 /hpf / Bacteria: Negative        ROS:  Negative except for:    PAST MEDICAL & SURGICAL HISTORY:  Pleural effusion  Moderate mitral regurgitation  Systolic heart failure  Rhinitis, allergic  Essential hypertension  DM type 2 (diabetes mellitus, type 2)  Non-Hodgkins Lymphoma  Non-Hodgkin lymphoma: h/o axillary dissection      SOCIAL HISTORY:    FAMILY HISTORY:  Family history of non-Hodgkin's lymphoma (Sibling)  Family history of CHF (congestive heart failure)      Allergies    No Known Allergies    Intolerances        PHYSICAL EXAM  General: adult in NAD  HEENT: clear oropharynx, anicteric sclera, pink conjunctivae  Neck: supple  CV: normal S1S2 with no murmur rubs or gallops  Lungs: clear to auscultation, no wheezes, no rales  Abdomen: soft non-tender non-distended, no hepatosplenomegaly  Ext: no clubbing cyanosis or edema  Skin: no rashes and no petechiae  Neuro: alert and oriented X3 no focal deficits    BLOOD SMEAR INTERPRETATION:    RADIOLOGY :

## 2019-05-08 NOTE — CONSULT NOTE ADULT - ASSESSMENT
Patient is a 68 year old Non-Hodgkin's Lymphoma tx with chemotherapy in 2004, DM2 with CKD stage 3, HTN, CHF EF 35% on cardiac cath 12/ 2016, pleural effusion s/p left thoracentesis in 10/2016, Non ischemic cardiomyopathy, gout and HLD presents to the ED sent in from cardiologists office because of EKG changes. Patient is not sure why he is here. He was not sure where he was, why he is here or what even the year is. He remarks that he retired a few months ago and he stopped taking his medications then because he "wasn't feeling good". He states he was wife his wife earlier but then later he states shes in [place]. Patient currently denies chest pain, shortness of breath, palpitations, syncope, fevers, chills, recent travel or sick contacts. No new meds however he has stopped taking most of his meds. He overall, feels fine and is not sure why he is in the hospital.     In the ED, VSS. Labs at baseline, CT head with old stroke, Trops elevated and peaked at 50, now normal, EKG with TWI in lateral leads.   Pt had echo (Nov 2016) EF 44%  Cardica cath (Dec 2016) non obstructive CAD    Pt denies h/o syncope, but had episodes of 'feeling dizzy & weak' while walking in the parking lot which relieved by rest for few minutes about 3 weeks ago.    EP consult requested for abnormal EKG.  12EKG & tele strip showed complete heart block with alternating RBBB with LBBB  with HR 50's bpm.

## 2019-05-08 NOTE — H&P ADULT - PROBLEM SELECTOR PLAN 3
- not in acute exacerbation  - c/w aspirin, beta blocker, PO lasix 20 mg bid, ARB and spironolactone  - c/w telemetry

## 2019-05-08 NOTE — CONSULT NOTE ADULT - SUBJECTIVE AND OBJECTIVE BOX
Patient is a 68 year old man with PMH significant for non-Hodgkin lymphoma s/p chemo 2004, DM2 w/ CKD stage 3, CHF w/ EF 35% s/p cath 2016, cardiomyopathy, gout, and HLD presenting with confusion and altered mental status. Patient is a poor historian. According to the patient, he was told by his manager at work that "he didn't look good" and suggested that he seek medical care. The patient was supposed to go to [place] with his wife but instead went to the hospital. The patient today endorses confusion and slowed thoughts. Otherwise, he is unsure why he is in the hospital. The patien pricila endosrse confusion and slowed thoughts. TOherwise, he is unsure why he is in [facility] hospital. 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 He has been retired for 4 years but says he still visits his work. Socially,     INCOMPLETE Neurology  Consult Note  05-08-19    Name:  NIK GRIMM; 68y (1950)    Reason for Admission: Altered mental status    Chief Complaint: "I don't know"    HPI:  ** Patient poor historian **  Patient is a 68 year old man with PMH significant for non-Hodgkin lymphoma s/p chemo 2004, DM2 w/ CKD stage 3, CHF w/ EF 35% s/p cath 2016, cardiomyopathy, gout, and HLD presenting with confusion and altered mental status. Initially, the patient claims he does not know why he is in the hospital. Later, the patient says was told by his manager at work that "he didn't look good" and suggested that he seek medical care. The patient was supposed to go to [place] with his wife but instead went to the hospital. The patient today endorses confusion and slowed thoughts. When asked about medication use, the patient says he does not know why or when he stopped taking his medications. 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 Socially, the patient is  with a daughter who lives in [place]. He has a 25 pack-year smoking history but quit 5 years ago. He endorses minimal alcohol intake and denies any illicit drug use. ROS is negative for headaches, photophobia, changes in vision, diplopia, loss of sensation, weight changes, or recent fatigue. Patient's gait has been stable and does not require assistance with ambulation. The patient's mood is "good."    In the ED, VSS. Labs at baseline, CT head with old stroke, Trops elevated and peaked at 50, now normal, EKG with TWI in lateral leads. (08 May 2019 10:33)    Review of Systems:  As states in HPI.    PMHx:   Pleural effusion  Moderate mitral regurgitation  Systolic heart failure  Rhinitis, allergic  Essential hypertension  DM type 2 (diabetes mellitus, type 2)  Diabetes Mellitus  Non-Hodgkins Lymphoma    PFHx:   Family history of non-Hodgkin's lymphoma (Sibling)  Family history of CHF (congestive heart failure)    PSuHx:   Non-Hodgkin lymphoma  No significant past surgical history    PSoHx:     Marital Status:  ( X )    (   ) Single    (   )    (  )   Occupation: retired  (construction)  Lives with: (  ) alone  (  ) children   (X ) spouse   (  ) parents  (  ) other  Recent Travel:     Substance Use (street drugs): (  ) never used  (  ) other:  Tobacco Usage:  (   ) never smoked   ( X) former smoker   (   ) current smoker  (  25 ) pack year  Alcohol Usage: social  Sexual History:     Medications:  MEDICATIONS  (STANDING):  allopurinol 300 milliGRAM(s) Oral daily  aspirin enteric coated 81 milliGRAM(s) Oral daily  dextrose 5%. 1000 milliLiter(s) (50 mL/Hr) IV Continuous <Continuous>  dextrose 50% Injectable 12.5 Gram(s) IV Push once  furosemide    Tablet 20 milliGRAM(s) Oral two times a day  heparin  Injectable 5000 Unit(s) SubCutaneous every 12 hours  insulin lispro (HumaLOG) corrective regimen sliding scale   SubCutaneous three times a day before meals  losartan 50 milliGRAM(s) Oral daily  spironolactone 25 milliGRAM(s) Oral daily    MEDICATIONS  (PRN):  dextrose 40% Gel 15 Gram(s) Oral once PRN Blood Glucose LESS THAN 70 milliGRAM(s)/deciliter  glucagon  Injectable 1 milliGRAM(s) IntraMuscular once PRN Glucose LESS THAN 70 milligrams/deciliter    Vitals:  T(C): 36.7 (05-08-19 @ 14:58), Max: 36.7 (05-08-19 @ 14:58)  HR: 56 (05-08-19 @ 14:58) (55 - 64)  BP: 147/63 (05-08-19 @ 14:58) (147/63 - 167/79)  RR: 19 (05-08-19 @ 14:58) (17 - 19)  SpO2: 95% (05-08-19 @ 14:58) (94% - 99%)    Labs:                        12.3   7.5   )-----------( 160      ( 08 May 2019 06:02 )             38.0     05-08    144  |  108  |  19  ----------------------------<  100<H>  3.5   |  22  |  1.50<H>    Ca    9.1      08 May 2019 06:00  Phos  3.8     05-07  Mg     1.9     05-07    TPro  7.3  /  Alb  3.5  /  TBili  0.9  /  DBili  x   /  AST  21  /  ALT  15  /  AlkPhos  137<H>  05-07    CAPILLARY BLOOD GLUCOSE  POCT Blood Glucose.: 161 mg/dL (08 May 2019 12:55)    LIVER FUNCTIONS - ( 07 May 2019 23:35 )  Alb: 3.5 g/dL / Pro: 7.3 g/dL / ALK PHOS: 137 U/L / ALT: 15 U/L / AST: 21 U/L / GGT: x           PHYSICAL EXAMINATION:  General: Well-developed, well nourished, in no acute distress.  Eyes: Conjunctiva and sclera clear.  Neurologic:  - Mental Status:  Alert, awake, oriented to person and month. Not oriented to day, year, president, or location. Speech is fluent with intact naming, repetition, and comprehension; Good overall fund of knowledge; Immediate recall is 3/3 words and delayed recall is 0/3 words at 5 minutes; Able to read and write a sentence.  - Cranial Nerves II-XII:    II:  Visual fields are full to confrontation; Pupils are equal, round, and reactive to light;   III, IV, VI:  Extraocular movements are intact without nystagmus.  V:  Facial sensation is intact in the V1-V3 distribution bilaterally.  VII:  Face is symmetric with normal eye closure and smile  VIII:  Hearing is intact to finger rub.  IX, X:  Uvula is midline and soft palate rises symmetrically  XI:  Head turning and shoulder shrug are intact.  XII:  Tongue protudes in the midline.  - Motor:  Strength is 5/5 throughout.  There is no pronator drift.  Normal muscle bulk and tone throughout.  - Reflexes:  2+ and symmetric at the biceps, triceps, brachioradialis, knees, and ankles.  Plantar responses flexor.  - Sensory:  Intact throughout to light touch  - Coordination:  Finger-nose-finger and heel-knee-shin intact without dysmetria.  Rapid alternating hand movements intact.  - Gait:   Normal steps, base, arm swing, and turning.    Radiology:  CT Head No Cont:  (07 May 2019 21:50)    Impression:    No evidence of acute intracranial hemorrhage, midline shift, or   hydrocephalus.     Chronic right parietal lobe infarct.

## 2019-05-08 NOTE — ED ADULT NURSE REASSESSMENT NOTE - NS ED NURSE REASSESS COMMENT FT1
Patient received bed assignment 3dsu. Patient aware of assignment. Report given to receiving RN Khadijah ARNETT. Patient stable for transport. As per katerina Winkler for patient to go to unit without CM. Chart given to charge desk. Safety and comfort maintained while in ED.

## 2019-05-08 NOTE — H&P ADULT - ASSESSMENT
Patient is a 68 year old Non-Hodgkin's Lymphoma tx with chemotherapy in 2004, DM2 with CKD stage 3, HTN, CHF EF 35% on cardiac cath 12/ 2016, pleural effusion s/p left pleuracentesis in 10/2016, cardiomyopathy, gout and HLD presents to the ED sent in from cardiologists office because of EKG changes

## 2019-05-08 NOTE — H&P ADULT - NSHPLABSRESULTS_GEN_ALL_CORE
.  LABS:                         12.3   7.5   )-----------( 160      ( 08 May 2019 06:02 )             38.0         144  |  108  |  19  ----------------------------<  100<H>  3.5   |  22  |  1.50<H>    Ca    9.1      08 May 2019 06:00  Phos  3.8       Mg     1.9         TPro  7.3  /  Alb  3.5  /  TBili  0.9  /  DBili  x   /  AST  21  /  ALT  15  /  AlkPhos  137<H>        Urinalysis Basic - ( 08 May 2019 00:17 )    Color: Yellow / Appearance: Slightly Turbid / S.023 / pH: x  Gluc: x / Ketone: Negative  / Bili: Small / Urobili: 3 mg/dL   Blood: x / Protein: 300 mg/dL / Nitrite: Negative   Leuk Esterase: Negative / RBC: 2 /hpf / WBC 7 /HPF   Sq Epi: x / Non Sq Epi: 1 /hpf / Bacteria: Negative        Lactate, Blood: 1.2 mmol/L ( @ 06:00)      RADIOLOGY, EKG & ADDITIONAL TESTS: Reviewed.     ekg reviewed by me--> twi in lateral leads  ct head with chronic parietal lobe infarct

## 2019-05-08 NOTE — CHART NOTE - NSCHARTNOTEFT_GEN_A_CORE
CCU Accept Note    Transfer from: (X) Telemetry    Accepting physician: Bruna Medrano    HPI:  Pt is a 68 year old male PMHx of Non-Hodgkin's Lymphoma tx with chemotherapy in 2004, DM2 with CKD stage 3, HTN, CHF EF 35% on cardiac cath 12/2016, pleural effusion s/p left pleuracentesis in 10/2016, cardiomyopathy, gout and HLD presents to the ED sent in from cardiologists office because of EKG changes. Pt is a poor historian and is not sure why he's here, only alert to his name. He remarks that he retired a few months ago and he stopped taking his medications then because he "wasn't feeling good". He states he was with his wife earlier but then later he states she's in [place]. In ED denied chest pain, shortness of breath, palpitations, syncope, fevers, chills, recent travel or sick contacts. No new meds however he has stopped taking most of his meds over the past year. He overall, feels fine and is not sure why he is in the hospital.     Previous team had tried to reach his daughter but never received a call back. Pt's pharmacy called and stated pt picked up furosemide and irbersartan in April but has not picked up any other meds in months. Mentation in 2016 AAOX3.    In the ED, VSS. Labs at baseline, CT head with old stroke, Trops elevated and peaked at 50, now normal, EKG with TWI in lateral leads.     On the floor EKG abnormal found to have complete heart block with alternating RBBB with LBBB with HR 50's bpm. EP consulted and following. Previous cardiac workup, echo (Nov 2016) EF 44%, cardiac cath (Dec 2016) non obstructive CAD. Pt transferred to CCU2 for close observation with external pacer stand by in view of alternating bundle branch block.    In CCU2 pt A&O x1-2, initially pleasant and asymptomatic. Shortly after arrival pt became agitated, sitting at edge of the bed c/o nausea and wanting to go home because he had to vomit. Pt began to state he wants to go home pulling his leads, pacer pads and an monitoring devices off. Security and management called to bedside as pt would not cooperate and does not have capacity. Pt's situation thoroughly explained       SUBJECTIVE DATA:    OBJECTIVE DATA:    Vital Signs Last 24 Hrs  T(C): 36.4 (08 May 2019 21:00), Max: 36.7 (08 May 2019 14:58)  T(F): 97.6 (08 May 2019 21:00), Max: 98.1 (08 May 2019 14:58)  HR: 62 (08 May 2019 22:30) (50 - 62)  BP: 127/72 (08 May 2019 22:30) (127/72 - 167/79)  BP(mean): 95 (08 May 2019 22:30) (94 - 104)  RR: 20 (08 May 2019 22:30) (17 - 37)  SpO2: 93% (08 May 2019 22:30) (93% - 99%)  I&O's Summary    08 May 2019 07:01  -  08 May 2019 22:59  --------------------------------------------------------  IN: 600 mL / OUT: 150 mL / NET: 450 mL        Allergies:      MEDICATIONS  (STANDING):  allopurinol 300 milliGRAM(s) Oral daily  aspirin enteric coated 81 milliGRAM(s) Oral daily  dextrose 5%. 1000 milliLiter(s) (50 mL/Hr) IV Continuous <Continuous>  dextrose 50% Injectable 12.5 Gram(s) IV Push once  folic acid 1 milliGRAM(s) Oral daily  furosemide    Tablet 20 milliGRAM(s) Oral two times a day  haloperidol    Injectable 5 milliGRAM(s) IV Push once  haloperidol    Injectable 5 milliGRAM(s) IV Push once  heparin  Injectable 5000 Unit(s) SubCutaneous every 12 hours  insulin lispro (HumaLOG) corrective regimen sliding scale   SubCutaneous three times a day before meals  LORazepam   Injectable 1 milliGRAM(s) IV Push once  LORazepam   Injectable 2 milliGRAM(s) IV Push every 4 hours  LORazepam   Injectable   IV Push   losartan 50 milliGRAM(s) Oral daily  magnesium sulfate  IVPB 2 Gram(s) IV Intermittent once  potassium chloride  20 mEq/100 mL IVPB 20 milliEquivalent(s) IV Intermittent once  spironolactone 25 milliGRAM(s) Oral daily    MEDICATIONS  (PRN):  dextrose 40% Gel 15 Gram(s) Oral once PRN Blood Glucose LESS THAN 70 milliGRAM(s)/deciliter  glucagon  Injectable 1 milliGRAM(s) IntraMuscular once PRN Glucose LESS THAN 70 milligrams/deciliter  ondansetron Injectable 8 milliGRAM(s) IV Push two times a day PRN Nausea and/or Vomiting      LABS                                            12.3                  Neurophils% (auto):   x      (05-08 @ 06:02):    7.5  )-----------(160          Lymphocytes% (auto):  x                                             38.0                   Eosinphils% (auto):   x        Manual%: Neutrophils x    ; Lymphocytes x    ; Eosinophils x    ; Bands%: x    ; Blasts x                                    144    |  108    |  19                  Calcium: 9.1   / iCa: x      (05-08 @ 06:00)    ----------------------------<  100       Magnesium: x                                3.5     |  22     |  1.50             Phosphorous: x        TPro  7.3    /  Alb  3.5    /  TBili  0.9    /  DBili  x      /  AST  21     /  ALT  15     /  AlkPhos  137    07 May 2019 23:35        EKG:  Telemetry:  Echo:  Cardiac Cath:  Stress Test:  Imaging    ASSESSMENT & PLAN: CCU Accept Note    Transfer from: (X) Telemetry    Accepting physician: Bruna Medrano    HPI:  Pt is a 68 year old male PMHx of Non-Hodgkin's Lymphoma tx with chemotherapy in 2004, DM2 with CKD stage 3, HTN, CHF EF 35% on cardiac cath 12/2016, pleural effusion s/p left pleuracentesis in 10/2016, cardiomyopathy, gout and HLD presents to the ED sent in from cardiologists office because of EKG changes. Pt is a poor historian and is not sure why he's here, only alert to his name. He remarks that he retired a few months ago and he stopped taking his medications then because he "wasn't feeling good". He states he was with his wife earlier but then later he states she's in [place]. In ED denied chest pain, shortness of breath, palpitations, syncope, fevers, chills, recent travel or sick contacts. No new meds however he has stopped taking most of his meds over the past year. He overall, feels fine and is not sure why he is in the hospital.     Previous team had tried to reach his daughter but never received a call back. Pt's pharmacy called and stated pt picked up furosemide and irbersartan in April but has not picked up any other meds in months. Mentation in 2016 AAOX3.    In the ED, VSS. Labs at baseline, CT head with old stroke, Trops elevated and peaked at 50, now normal, EKG with TWI in lateral leads.     On the floor EKG abnormal found to have complete heart block with alternating RBBB with LBBB with HR 50's bpm. EP consulted and following. Previous cardiac workup, echo (Nov 2016) EF 44%, cardiac cath (Dec 2016) non obstructive CAD. Pt transferred to CCU2 for close observation with external pacer stand by in view of alternating bundle branch block. Of note neurology consulted for AMS and recommend MRI head and routine EEG.     In CCU2 pt A&O x1, initially pleasant and asymptomatic CHB. Shortly after arrival pt became agitated, sitting at edge of the bed c/o nausea and wanting to go home because he had to vomit. Pt began pulling his leads, pacer pads and all monitoring devices off. He started getting dressed and appeared pale and short of breath. Security and management called to bedside as pt would not cooperate and does not have capacity. Pt's situation thoroughly explained by PA, cardiology fellow and nursing management without success. According to neuro note he endorses minimal alcohol intake and denies any illicit drug use however he now has mild tremors and is SOB and diaphoretic. Ativan given and CIWA protocol initiated. Pt now sedated on O2 for intermittent sats to 70s-80s.       SUBJECTIVE DATA:    OBJECTIVE DATA:    Vital Signs Last 24 Hrs  T(C): 36.4 (08 May 2019 21:00), Max: 36.7 (08 May 2019 14:58)  T(F): 97.6 (08 May 2019 21:00), Max: 98.1 (08 May 2019 14:58)  HR: 62 (08 May 2019 22:30) (50 - 62)  BP: 127/72 (08 May 2019 22:30) (127/72 - 167/79)  BP(mean): 95 (08 May 2019 22:30) (94 - 104)  RR: 20 (08 May 2019 22:30) (17 - 37)  SpO2: 93% (08 May 2019 22:30) (93% - 99%)  I&O's Summary    08 May 2019 07:01  -  08 May 2019 22:59  --------------------------------------------------------  IN: 600 mL / OUT: 150 mL / NET: 450 mL        Allergies:      MEDICATIONS  (STANDING):  allopurinol 300 milliGRAM(s) Oral daily  aspirin enteric coated 81 milliGRAM(s) Oral daily  dextrose 5%. 1000 milliLiter(s) (50 mL/Hr) IV Continuous <Continuous>  dextrose 50% Injectable 12.5 Gram(s) IV Push once  folic acid 1 milliGRAM(s) Oral daily  furosemide    Tablet 20 milliGRAM(s) Oral two times a day  haloperidol    Injectable 5 milliGRAM(s) IV Push once  haloperidol    Injectable 5 milliGRAM(s) IV Push once  heparin  Injectable 5000 Unit(s) SubCutaneous every 12 hours  insulin lispro (HumaLOG) corrective regimen sliding scale   SubCutaneous three times a day before meals  LORazepam   Injectable 1 milliGRAM(s) IV Push once  LORazepam   Injectable 2 milliGRAM(s) IV Push every 4 hours  LORazepam   Injectable   IV Push   losartan 50 milliGRAM(s) Oral daily  magnesium sulfate  IVPB 2 Gram(s) IV Intermittent once  potassium chloride  20 mEq/100 mL IVPB 20 milliEquivalent(s) IV Intermittent once  spironolactone 25 milliGRAM(s) Oral daily    MEDICATIONS  (PRN):  dextrose 40% Gel 15 Gram(s) Oral once PRN Blood Glucose LESS THAN 70 milliGRAM(s)/deciliter  glucagon  Injectable 1 milliGRAM(s) IntraMuscular once PRN Glucose LESS THAN 70 milligrams/deciliter  ondansetron Injectable 8 milliGRAM(s) IV Push two times a day PRN Nausea and/or Vomiting      LABS                                            12.3                  Neurophils% (auto):   x      (05-08 @ 06:02):    7.5  )-----------(160          Lymphocytes% (auto):  x                                             38.0                   Eosinphils% (auto):   x        Manual%: Neutrophils x    ; Lymphocytes x    ; Eosinophils x    ; Bands%: x    ; Blasts x                                    144    |  108    |  19                  Calcium: 9.1   / iCa: x      (05-08 @ 06:00)    ----------------------------<  100       Magnesium: x                                3.5     |  22     |  1.50             Phosphorous: x        TPro  7.3    /  Alb  3.5    /  TBili  0.9    /  DBili  x      /  AST  21     /  ALT  15     /  AlkPhos  137    07 May 2019 23:35      Telemetry: CHB with alternating RBBB with LBBB, HR 30s to 60s    Echo: < from: Transthoracic Echocardiogram (05.08.19 @ 15:16) >    Conclusions:  Suboptimal apical windows.  Estimated LV ejection fraction 45%.  Eccentric mitral regurgitation directed posteriorly,  probably severe.  Moderate right ventricular enlargement. Study quality  precludes accurate assessment of right ventricular systolic  function.  Severe pulmonary hypertension. Estimated PASP = 75 mmHg.    < end of copied text >    Imaging  < from: CT Head No Cont (05.07.19 @ 21:50) >    FINDINGS:   Chronic right parietal lobe infarct.    There is no evidence of an acute intracranial hemorrhage, extra-axial   collection, mass effect, or midline shift. The basal cisterns are patent.   No evidence of downward herniation. No hydrocephalus.    The visualized paranasal sinuses and mastoid air cells are clear. The   globes are symmetric in size and contour.    No displaced calvarial fracture.       IMPRESSION:   No evidence of acute intracranial hemorrhage, midline shift, or   hydrocephalus.    Chronic right parietal lobe infarct.    < end of copied text >        ASSESSMENT & PLAN:    #CHB  - hold all AV karen blocking agents  - NPO for cardiac MRI to r/o other cause of Non ischemic CMP  - Per EP pt will go for CRT-P vs CRT-D implant after neuro work-up/echo/MRI  - Hold antihypertensives for now, pt compensating well  - Observe with R2 pads  - serial EKGs, monitor on tele    #Chronic systolic heart failure  - TTE EF 45%, likely severe MR  - Medication non compliance  - Holding antihypertensives given CHB  - Cont spironolactone and lasix 20 po qd with additional IVP PRN    #Altered mental status  - neuro following, order MRI brain w/wo gadolinium, routine EEG  - CIWA protocol  - Attempt to get in touch with family for more information    Cesario Best, CCU PA-C  #[id number] CCU Accept Note    Transfer from: (X) Telemetry    Accepting physician: Bruna Medrano    HPI:  Pt is a 68 year old male PMHx of Non-Hodgkin's Lymphoma tx with chemotherapy in 2004, DM2 with CKD stage 3, HTN, CHF EF 35% on cardiac cath 12/2016, pleural effusion s/p left pleuracentesis in 10/2016, cardiomyopathy, gout and HLD presents to the ED sent in from cardiologists office because of EKG changes. Pt is a poor historian and is not sure why he's here, only alert to his name. He remarks that he retired a few months ago and he stopped taking his medications then because he "wasn't feeling good". He states he was with his wife earlier but then later he states she's in [place]. In ED denied chest pain, shortness of breath, palpitations, syncope, fevers, chills, recent travel or sick contacts. No new meds however he has stopped taking most of his meds over the past year. He overall, feels fine and is not sure why he is in the hospital.     Previous team had tried to reach his daughter but never received a call back. Pt's pharmacy called and stated pt picked up furosemide and irbersartan in April but has not picked up any other meds in months. Mentation in 2016 AAOX3.    In the ED, VSS. Labs at baseline, CT head with old stroke, Trops elevated and peaked at 50, now normal, EKG with TWI in lateral leads.     On the floor EKG abnormal found to have complete heart block with alternating RBBB with LBBB with HR 50's bpm. EP consulted and following. Previous cardiac workup, echo (Nov 2016) EF 44%, cardiac cath (Dec 2016) non obstructive CAD. Pt transferred to CCU2 for close observation with external pacer stand by in view of alternating bundle branch block. Cardiology followed the patient throughout his stay and noted  EKG to be abnormal with right bundle branch block 2-1 heart block possible periods of AV disassociation. Of note neurology consulted for AMS and recommend MRI head and routine EEG.     In CCU2 pt A&O x1, initially pleasant and asymptomatic CHB. Shortly after arrival pt became agitated, sitting at edge of the bed c/o nausea and wanting to go home because he had to vomit. Pt began pulling his leads, pacer pads and all monitoring devices off. He started getting dressed and appeared pale and short of breath. Security and management called to bedside as pt would not cooperate and does not have capacity. Pt's situation thoroughly explained by PA, cardiology fellow and nursing management without success. According to neuro note he endorses minimal alcohol intake and denies any illicit drug use however he now has mild tremors and is SOB and diaphoretic. Ativan given and CIWA protocol initiated. Pt now sedated on O2 for intermittent sats to 70s-80s.       SUBJECTIVE DATA:    OBJECTIVE DATA:    Vital Signs Last 24 Hrs  T(C): 36.4 (08 May 2019 21:00), Max: 36.7 (08 May 2019 14:58)  T(F): 97.6 (08 May 2019 21:00), Max: 98.1 (08 May 2019 14:58)  HR: 62 (08 May 2019 22:30) (50 - 62)  BP: 127/72 (08 May 2019 22:30) (127/72 - 167/79)  BP(mean): 95 (08 May 2019 22:30) (94 - 104)  RR: 20 (08 May 2019 22:30) (17 - 37)  SpO2: 93% (08 May 2019 22:30) (93% - 99%)  I&O's Summary    08 May 2019 07:01  -  08 May 2019 22:59  --------------------------------------------------------  IN: 600 mL / OUT: 150 mL / NET: 450 mL        Allergies:      MEDICATIONS  (STANDING):  allopurinol 300 milliGRAM(s) Oral daily  aspirin enteric coated 81 milliGRAM(s) Oral daily  dextrose 5%. 1000 milliLiter(s) (50 mL/Hr) IV Continuous <Continuous>  dextrose 50% Injectable 12.5 Gram(s) IV Push once  folic acid 1 milliGRAM(s) Oral daily  furosemide    Tablet 20 milliGRAM(s) Oral two times a day  haloperidol    Injectable 5 milliGRAM(s) IV Push once  haloperidol    Injectable 5 milliGRAM(s) IV Push once  heparin  Injectable 5000 Unit(s) SubCutaneous every 12 hours  insulin lispro (HumaLOG) corrective regimen sliding scale   SubCutaneous three times a day before meals  LORazepam   Injectable 1 milliGRAM(s) IV Push once  LORazepam   Injectable 2 milliGRAM(s) IV Push every 4 hours  LORazepam   Injectable   IV Push   losartan 50 milliGRAM(s) Oral daily  magnesium sulfate  IVPB 2 Gram(s) IV Intermittent once  potassium chloride  20 mEq/100 mL IVPB 20 milliEquivalent(s) IV Intermittent once  spironolactone 25 milliGRAM(s) Oral daily    MEDICATIONS  (PRN):  dextrose 40% Gel 15 Gram(s) Oral once PRN Blood Glucose LESS THAN 70 milliGRAM(s)/deciliter  glucagon  Injectable 1 milliGRAM(s) IntraMuscular once PRN Glucose LESS THAN 70 milligrams/deciliter  ondansetron Injectable 8 milliGRAM(s) IV Push two times a day PRN Nausea and/or Vomiting      LABS                                            12.3                  Neurophils% (auto):   x      (05-08 @ 06:02):    7.5  )-----------(160          Lymphocytes% (auto):  x                                             38.0                   Eosinphils% (auto):   x        Manual%: Neutrophils x    ; Lymphocytes x    ; Eosinophils x    ; Bands%: x    ; Blasts x                                    144    |  108    |  19                  Calcium: 9.1   / iCa: x      (05-08 @ 06:00)    ----------------------------<  100       Magnesium: x                                3.5     |  22     |  1.50             Phosphorous: x        TPro  7.3    /  Alb  3.5    /  TBili  0.9    /  DBili  x      /  AST  21     /  ALT  15     /  AlkPhos  137    07 May 2019 23:35      Telemetry: CHB with alternating RBBB with LBBB, HR 30s to 60s    Echo: < from: Transthoracic Echocardiogram (05.08.19 @ 15:16) >    Conclusions:  Suboptimal apical windows.  Estimated LV ejection fraction 45%.  Eccentric mitral regurgitation directed posteriorly,  probably severe.  Moderate right ventricular enlargement. Study quality  precludes accurate assessment of right ventricular systolic  function.  Severe pulmonary hypertension. Estimated PASP = 75 mmHg.    < end of copied text >    Imaging  < from: CT Head No Cont (05.07.19 @ 21:50) >    FINDINGS:   Chronic right parietal lobe infarct.    There is no evidence of an acute intracranial hemorrhage, extra-axial   collection, mass effect, or midline shift. The basal cisterns are patent.   No evidence of downward herniation. No hydrocephalus.    The visualized paranasal sinuses and mastoid air cells are clear. The   globes are symmetric in size and contour.    No displaced calvarial fracture.       IMPRESSION:   No evidence of acute intracranial hemorrhage, midline shift, or   hydrocephalus.    Chronic right parietal lobe infarct.    < end of copied text >        ASSESSMENT & PLAN:    #CHB  - hold all AV karen blocking agents  - NPO for cardiac MRI to r/o other cause of Non ischemic CMP  - Per EP pt will go for CRT-P vs CRT-D implant after neuro work-up/echo/MRI  - Hold antihypertensives for now, pt compensating well  - Observe with R2 pads  - serial EKGs, monitor on tele    #Chronic systolic heart failure  - TTE EF 45%, likely severe MR  - Medication non compliance  - Holding antihypertensives given CHB  - Cont spironolactone and lasix 20 po qd with additional IVP PRN    #Altered mental status  - neuro following, order MRI brain w/wo gadolinium, routine EEG  - CIWA protocol  - Attempt to get in touch with family for more information    Cesario Best, CCU PA-C  #[id number] CCU Accept Note    Transfer from: (X) Telemetry    Accepting physician: Bruna Medrano    HPI:  Pt is a 68 year old male PMHx of Non-Hodgkin's Lymphoma tx with chemotherapy in 2004, DM2 with CKD stage 3, HTN, CHF EF 35% on cardiac cath 12/2016, pleural effusion s/p left pleuracentesis in 10/2016, cardiomyopathy, gout and HLD presents to the ED sent in from cardiologists office because of EKG changes. Pt is a poor historian and is not sure why he's here, only alert to his name. He remarks that he retired a few months ago and he stopped taking his medications then because he "wasn't feeling good". He states he was with his wife earlier but then later he states she's in [place]. In ED denied chest pain, shortness of breath, palpitations, syncope, fevers, chills, recent travel or sick contacts. No new meds however he has stopped taking most of his meds over the past year. He overall, feels fine and is not sure why he is in the hospital.     Previous team had tried to reach his daughter but never received a call back. Pt's pharmacy called and stated pt picked up furosemide and irbersartan in April but has not picked up any other meds in months. Mentation in 2016 AAOX3.    In the ED, VSS. Labs at baseline, CT head with old stroke, Trops elevated and peaked at 50, now normal, EKG with TWI in lateral leads.     On the floor EKG abnormal found to have complete heart block with alternating RBBB with LBBB with HR 50's bpm. EP consulted and following. Previous cardiac workup, echo (Nov 2016) EF 44%, cardiac cath (Dec 2016) non obstructive CAD. Pt transferred to CCU2 for close observation with external pacer stand by in view of alternating bundle branch block. Cardiology followed the patient throughout his stay and noted, EKG to be abnormal with right bundle branch block 2-1 heart block possible periods of AV disassociation. Of note neurology consulted for AMS and recommend MRI head and routine EEG.     In CCU2 pt A&O x1, initially pleasant and asymptomatic CHB. Shortly after arrival pt became agitated, sitting at edge of the bed c/o nausea and wanting to go home because he had to vomit. Pt began pulling his leads, pacer pads and all monitoring devices off. He started getting dressed and appeared pale and short of breath. Security and management called to bedside as pt would not cooperate and does not have capacity. Pt's situation thoroughly explained by PA, cardiology fellow and nursing management without success. According to neuro note he endorses minimal alcohol intake and denies any illicit drug use however he now has mild tremors and is SOB and diaphoretic. Ativan given and CIWA protocol initiated. Pt now sedated on O2 for intermittent sats to 70s-80s.       SUBJECTIVE DATA:    OBJECTIVE DATA:    Vital Signs Last 24 Hrs  T(C): 36.4 (08 May 2019 21:00), Max: 36.7 (08 May 2019 14:58)  T(F): 97.6 (08 May 2019 21:00), Max: 98.1 (08 May 2019 14:58)  HR: 62 (08 May 2019 22:30) (50 - 62)  BP: 127/72 (08 May 2019 22:30) (127/72 - 167/79)  BP(mean): 95 (08 May 2019 22:30) (94 - 104)  RR: 20 (08 May 2019 22:30) (17 - 37)  SpO2: 93% (08 May 2019 22:30) (93% - 99%)  I&O's Summary    08 May 2019 07:01  -  08 May 2019 22:59  --------------------------------------------------------  IN: 600 mL / OUT: 150 mL / NET: 450 mL        Allergies:      MEDICATIONS  (STANDING):  allopurinol 300 milliGRAM(s) Oral daily  aspirin enteric coated 81 milliGRAM(s) Oral daily  dextrose 5%. 1000 milliLiter(s) (50 mL/Hr) IV Continuous <Continuous>  dextrose 50% Injectable 12.5 Gram(s) IV Push once  folic acid 1 milliGRAM(s) Oral daily  furosemide    Tablet 20 milliGRAM(s) Oral two times a day  haloperidol    Injectable 5 milliGRAM(s) IV Push once  haloperidol    Injectable 5 milliGRAM(s) IV Push once  heparin  Injectable 5000 Unit(s) SubCutaneous every 12 hours  insulin lispro (HumaLOG) corrective regimen sliding scale   SubCutaneous three times a day before meals  LORazepam   Injectable 1 milliGRAM(s) IV Push once  LORazepam   Injectable 2 milliGRAM(s) IV Push every 4 hours  LORazepam   Injectable   IV Push   losartan 50 milliGRAM(s) Oral daily  magnesium sulfate  IVPB 2 Gram(s) IV Intermittent once  potassium chloride  20 mEq/100 mL IVPB 20 milliEquivalent(s) IV Intermittent once  spironolactone 25 milliGRAM(s) Oral daily    MEDICATIONS  (PRN):  dextrose 40% Gel 15 Gram(s) Oral once PRN Blood Glucose LESS THAN 70 milliGRAM(s)/deciliter  glucagon  Injectable 1 milliGRAM(s) IntraMuscular once PRN Glucose LESS THAN 70 milligrams/deciliter  ondansetron Injectable 8 milliGRAM(s) IV Push two times a day PRN Nausea and/or Vomiting      LABS                                            12.3                  Neurophils% (auto):   x      (05-08 @ 06:02):    7.5  )-----------(160          Lymphocytes% (auto):  x                                             38.0                   Eosinphils% (auto):   x        Manual%: Neutrophils x    ; Lymphocytes x    ; Eosinophils x    ; Bands%: x    ; Blasts x                                    144    |  108    |  19                  Calcium: 9.1   / iCa: x      (05-08 @ 06:00)    ----------------------------<  100       Magnesium: x                                3.5     |  22     |  1.50             Phosphorous: x        TPro  7.3    /  Alb  3.5    /  TBili  0.9    /  DBili  x      /  AST  21     /  ALT  15     /  AlkPhos  137    07 May 2019 23:35      Telemetry: CHB with alternating RBBB with LBBB, HR 30s to 60s    Echo: < from: Transthoracic Echocardiogram (05.08.19 @ 15:16) >    Conclusions:  Suboptimal apical windows.  Estimated LV ejection fraction 45%.  Eccentric mitral regurgitation directed posteriorly,  probably severe.  Moderate right ventricular enlargement. Study quality  precludes accurate assessment of right ventricular systolic  function.  Severe pulmonary hypertension. Estimated PASP = 75 mmHg.    < end of copied text >    Imaging  < from: CT Head No Cont (05.07.19 @ 21:50) >    FINDINGS:   Chronic right parietal lobe infarct.    There is no evidence of an acute intracranial hemorrhage, extra-axial   collection, mass effect, or midline shift. The basal cisterns are patent.   No evidence of downward herniation. No hydrocephalus.    The visualized paranasal sinuses and mastoid air cells are clear. The   globes are symmetric in size and contour.    No displaced calvarial fracture.       IMPRESSION:   No evidence of acute intracranial hemorrhage, midline shift, or   hydrocephalus.    Chronic right parietal lobe infarct.    < end of copied text >        ASSESSMENT & PLAN:    #CHB  - hold all AV karen blocking agents  - NPO for cardiac MRI to r/o other cause of Non ischemic CMP  - Per EP pt will go for CRT-P vs CRT-D implant after neuro work-up/echo/MRI  - Hold antihypertensives for now, pt compensating well  - Observe with R2 pads  - serial EKGs, monitor on tele    #Chronic systolic heart failure  - TTE EF 45%, likely severe MR  - Medication non compliance  - Holding antihypertensives given CHB  - Cont spironolactone and lasix 20 po qd with additional IVP PRN    #Altered mental status  - neuro following, order MRI brain w/wo gadolinium, routine EEG  - CIWA protocol  - Attempt to get in touch with family for more information    Cesario Best, CCU PA-C  #[id number] CCU Accept Note    Transfer from: (X) Telemetry    Accepting physician: Bruna Medrano    HPI:  Pt is a 68 year old male PMHx of Non-Hodgkin's Lymphoma tx with chemotherapy in 2004, DM2 with CKD stage 3, HTN, CHF EF 35% on cardiac cath 12/2016, pleural effusion s/p left pleuracentesis in 10/2016, cardiomyopathy, gout and HLD presents to the ED sent in from cardiologists office because of EKG changes. Pt is a poor historian and is not sure why he's here, only alert to his name. He remarks that he retired a few months ago and he stopped taking his medications then because he "wasn't feeling good". He states he was with his wife earlier but then later he states she's in [place]. In ED denied chest pain, shortness of breath, palpitations, syncope, fevers, chills, recent travel or sick contacts. No new meds however he has stopped taking most of his meds over the past year. He overall, feels fine and is not sure why he is in the hospital.     Previous team had tried to reach his daughter but never received a call back. Pt's pharmacy called and stated pt picked up furosemide and irbersartan in April but has not picked up any other meds in months. Mentation in 2016 AAOX3.    In the ED, VSS. Labs at baseline, CT head with old stroke, Trops elevated and peaked at 50, now normal, EKG with TWI in lateral leads.     On the floor EKG abnormal found to have complete heart block with alternating RBBB with LBBB with HR 50's bpm. EP consulted and following. Previous cardiac workup, echo (Nov 2016) EF 44%, cardiac cath (Dec 2016) non obstructive CAD. Pt transferred to CCU2 for close observation with external pacer stand by in view of alternating bundle branch block. Cardiology followed the patient throughout his stay and noted, EKG to be abnormal with right bundle branch block 2-1 heart block possible periods of AV disassociation. Of note neurology consulted for AMS and recommend MRI head and routine EEG.     In CCU2 pt A&O x1, initially pleasant and asymptomatic CHB. Shortly after arrival pt became agitated, sitting at edge of the bed c/o nausea and wanting to go home because he had to vomit. Pt began pulling his leads, pacer pads and all monitoring devices off. He started getting dressed and appeared pale and short of breath. Security and management called to bedside as pt would not cooperate and does not have capacity. Pt's situation thoroughly explained by PA, cardiology fellow and nursing management without success. According to neuro note he endorses minimal alcohol intake and denies any illicit drug use however he now has mild tremors and is SOB and diaphoretic. Ativan given and CIWA protocol initiated. Pt now sedated on O2 for intermittent sats to 70s-80s.       SUBJECTIVE DATA:    OBJECTIVE DATA:    Vital Signs Last 24 Hrs  T(C): 36.4 (08 May 2019 21:00), Max: 36.7 (08 May 2019 14:58)  T(F): 97.6 (08 May 2019 21:00), Max: 98.1 (08 May 2019 14:58)  HR: 62 (08 May 2019 22:30) (50 - 62)  BP: 127/72 (08 May 2019 22:30) (127/72 - 167/79)  BP(mean): 95 (08 May 2019 22:30) (94 - 104)  RR: 20 (08 May 2019 22:30) (17 - 37)  SpO2: 93% (08 May 2019 22:30) (93% - 99%)  I&O's Summary    08 May 2019 07:01  -  08 May 2019 22:59  --------------------------------------------------------  IN: 600 mL / OUT: 150 mL / NET: 450 mL        Allergies:      MEDICATIONS  (STANDING):  allopurinol 300 milliGRAM(s) Oral daily  aspirin enteric coated 81 milliGRAM(s) Oral daily  dextrose 5%. 1000 milliLiter(s) (50 mL/Hr) IV Continuous <Continuous>  dextrose 50% Injectable 12.5 Gram(s) IV Push once  folic acid 1 milliGRAM(s) Oral daily  furosemide    Tablet 20 milliGRAM(s) Oral two times a day  haloperidol    Injectable 5 milliGRAM(s) IV Push once  haloperidol    Injectable 5 milliGRAM(s) IV Push once  heparin  Injectable 5000 Unit(s) SubCutaneous every 12 hours  insulin lispro (HumaLOG) corrective regimen sliding scale   SubCutaneous three times a day before meals  LORazepam   Injectable 1 milliGRAM(s) IV Push once  LORazepam   Injectable 2 milliGRAM(s) IV Push every 4 hours  LORazepam   Injectable   IV Push   losartan 50 milliGRAM(s) Oral daily  magnesium sulfate  IVPB 2 Gram(s) IV Intermittent once  potassium chloride  20 mEq/100 mL IVPB 20 milliEquivalent(s) IV Intermittent once  spironolactone 25 milliGRAM(s) Oral daily    MEDICATIONS  (PRN):  dextrose 40% Gel 15 Gram(s) Oral once PRN Blood Glucose LESS THAN 70 milliGRAM(s)/deciliter  glucagon  Injectable 1 milliGRAM(s) IntraMuscular once PRN Glucose LESS THAN 70 milligrams/deciliter  ondansetron Injectable 8 milliGRAM(s) IV Push two times a day PRN Nausea and/or Vomiting      LABS                                            12.3                  Neurophils% (auto):   x      (05-08 @ 06:02):    7.5  )-----------(160          Lymphocytes% (auto):  x                                             38.0                   Eosinphils% (auto):   x        Manual%: Neutrophils x    ; Lymphocytes x    ; Eosinophils x    ; Bands%: x    ; Blasts x                                    144    |  108    |  19                  Calcium: 9.1   / iCa: x      (05-08 @ 06:00)    ----------------------------<  100       Magnesium: x                                3.5     |  22     |  1.50             Phosphorous: x        TPro  7.3    /  Alb  3.5    /  TBili  0.9    /  DBili  x      /  AST  21     /  ALT  15     /  AlkPhos  137    07 May 2019 23:35      Telemetry: CHB with alternating RBBB with LBBB, HR 30s to 60s    Echo: < from: Transthoracic Echocardiogram (05.08.19 @ 15:16) >    Conclusions:  Suboptimal apical windows.  Estimated LV ejection fraction 45%.  Eccentric mitral regurgitation directed posteriorly,  probably severe.  Moderate right ventricular enlargement. Study quality  precludes accurate assessment of right ventricular systolic  function.  Severe pulmonary hypertension. Estimated PASP = 75 mmHg.    < end of copied text >    Imaging  < from: CT Head No Cont (05.07.19 @ 21:50) >    FINDINGS:   Chronic right parietal lobe infarct.    There is no evidence of an acute intracranial hemorrhage, extra-axial   collection, mass effect, or midline shift. The basal cisterns are patent.   No evidence of downward herniation. No hydrocephalus.    The visualized paranasal sinuses and mastoid air cells are clear. The   globes are symmetric in size and contour.    No displaced calvarial fracture.       IMPRESSION:   No evidence of acute intracranial hemorrhage, midline shift, or   hydrocephalus.    Chronic right parietal lobe infarct.    < end of copied text >        ASSESSMENT & PLAN:  Pt is a 68 year old male PMHx of Non-Hodgkin's Lymphoma tx with chemotherapy in 2004, DM2 with CKD stage 3, HTN, CHF EF 35% on cardiac cath 12/2016, pleural effusion s/p left pleuracentesis in 10/2016, cardiomyopathy, gout and HLD presently admitted to CCU for complete Heart block with RBBB currently intubated and transvenously paced    #Neuro  - Intubated and sedated   - Versed & fentanyl gtt to titrate to a RASS of 0 to -1  - Urine drug screen negative  - Blood alcohol level negative  - Patient appeared encephalopathic on admission and throughout hospital stay. Per daughter he is A0X3 typically .   - Differential for Encephalopathy includes infectious vs PE vs hypoxemia. Unclear etiology as patient does not have a white count nor does he have a lactate, severe pulmonary HTN  and on echo and   - Neuro Following, CT head demonstrated old parietal infarct   - MRI brain w/wo gadolinium,  once STEPHEN resolves   - Routine EEG ordered per Neuro Recs     #Resp  - Severe pulmonary HTN on echo  - Several intermittent apneic events prior to intubation   -  post intubation ABG WNL      #CV  Complete Heart block   -hold all AV karen blocking agents  - will go for  cardiac MRI to r/o other cause of Non ischemic CMP once extubated   - Per EP pt will go for CRT-P vs CRT-D implant after neuro work-up/echo/MRI  - Hold antihypertensives for now, pt compensating well  - Observe with R2 pads  - TVP placed overnight with capture   - serial EKGs, monitor on tele    #Chronic systolic heart failure  - TTE EF 45%, likely severe MR, moderately enlarged RV , severe pulmonary hypertension. Estimated PASP = 75 mmHg.  - Medication non compliance  - Holding antihypertensives given CHB  - Cont spironolactone and Lasix 20 po qd with additional IVP PRN      #GI  - NPO for now  - No Known Active issues     #ID  - No active issues     #Renal  -STEPHEN on CKD stage III  -STEPHEN likely pre-renal 2/2 to decreased cardiac output and perfusion   -Patient has mitatl in place  - monitor I &O's, renally dose all meds, avoid nephrotoxic agents     #Heme  - Patient anemic on admission with hemoglobin of 10.5 from 12.3 on admission  - iron studies with evidence of GEORGETTE     #Endo  - Type 2 DM  - Insulin Sliding Scale , hypoglycemia protocol in place   - Fingerticks q6     #DVT PPx  - Heparin Sub Q    Ainsley Clay MD  [facility]   Pager ID #: [phone]   PGY1- Categorical  Medicine Intern CCU Accept Note    Transfer from: (X) Telemetry    Accepting physician: Bruna Medrano    HPI:  Pt is a 68 year old male PMHx of Non-Hodgkin's Lymphoma tx with chemotherapy in 2004, DM2 with CKD stage 3, HTN, CHF EF 35% on cardiac cath 12/2016, pleural effusion s/p left pleuracentesis in 10/2016, cardiomyopathy, gout and HLD presents to the ED sent in from cardiologists office because of EKG changes. Pt is a poor historian and is not sure why he's here, only alert to his name. He remarks that he retired a few months ago and he stopped taking his medications then because he "wasn't feeling good". He states he was with his wife earlier but then later he states she's in [place]. In ED denied chest pain, shortness of breath, palpitations, syncope, fevers, chills, recent travel or sick contacts. No new meds however he has stopped taking most of his meds over the past year. He overall, feels fine and is not sure why he is in the hospital.     Previous team had tried to reach his daughter but never received a call back. Pt's pharmacy called and stated pt picked up furosemide and irbersartan in April but has not picked up any other meds in months. Mentation in 2016 AAOX3.    In the ED, VSS. Labs at baseline, CT head with old stroke, Trops elevated and peaked at 50, now normal, EKG with TWI in lateral leads.     On the floor EKG abnormal found to have complete heart block with alternating RBBB with LBBB with HR 50's bpm. EP consulted and following. Previous cardiac workup, echo (Nov 2016) EF 44%, cardiac cath (Dec 2016) non obstructive CAD. Pt transferred to CCU2 for close observation with external pacer stand by in view of alternating bundle branch block. Cardiology followed the patient throughout his stay and noted, EKG to be abnormal with right bundle branch block 2-1 heart block possible periods of AV disassociation. Of note neurology consulted for AMS and recommend MRI head and routine EEG.     In CCU2 pt A&O x1, initially pleasant and asymptomatic CHB. Shortly after arrival pt became agitated, sitting at edge of the bed c/o nausea and wanting to go home because he had to vomit. Pt began pulling his leads, pacer pads and all monitoring devices off. He started getting dressed and appeared pale and short of breath. Security and management called to bedside as pt would not cooperate and does not have capacity. Pt's situation thoroughly explained by PA, cardiology fellow and nursing management without success. According to neuro note he endorses minimal alcohol intake and denies any illicit drug use however he now has mild tremors and is SOB and diaphoretic. Ativan given and CIWA protocol initiated. Pt now sedated on O2 for intermittent sats to 70s-80s.       SUBJECTIVE DATA:    OBJECTIVE DATA:    Vital Signs Last 24 Hrs  T(C): 36.4 (08 May 2019 21:00), Max: 36.7 (08 May 2019 14:58)  T(F): 97.6 (08 May 2019 21:00), Max: 98.1 (08 May 2019 14:58)  HR: 62 (08 May 2019 22:30) (50 - 62)  BP: 127/72 (08 May 2019 22:30) (127/72 - 167/79)  BP(mean): 95 (08 May 2019 22:30) (94 - 104)  RR: 20 (08 May 2019 22:30) (17 - 37)  SpO2: 93% (08 May 2019 22:30) (93% - 99%)  I&O's Summary    08 May 2019 07:01  -  08 May 2019 22:59  --------------------------------------------------------  IN: 600 mL / OUT: 150 mL / NET: 450 mL        Allergies:      MEDICATIONS  (STANDING):  allopurinol 300 milliGRAM(s) Oral daily  aspirin enteric coated 81 milliGRAM(s) Oral daily  dextrose 5%. 1000 milliLiter(s) (50 mL/Hr) IV Continuous <Continuous>  dextrose 50% Injectable 12.5 Gram(s) IV Push once  folic acid 1 milliGRAM(s) Oral daily  furosemide    Tablet 20 milliGRAM(s) Oral two times a day  haloperidol    Injectable 5 milliGRAM(s) IV Push once  haloperidol    Injectable 5 milliGRAM(s) IV Push once  heparin  Injectable 5000 Unit(s) SubCutaneous every 12 hours  insulin lispro (HumaLOG) corrective regimen sliding scale   SubCutaneous three times a day before meals  LORazepam   Injectable 1 milliGRAM(s) IV Push once  LORazepam   Injectable 2 milliGRAM(s) IV Push every 4 hours  LORazepam   Injectable   IV Push   losartan 50 milliGRAM(s) Oral daily  magnesium sulfate  IVPB 2 Gram(s) IV Intermittent once  potassium chloride  20 mEq/100 mL IVPB 20 milliEquivalent(s) IV Intermittent once  spironolactone 25 milliGRAM(s) Oral daily    MEDICATIONS  (PRN):  dextrose 40% Gel 15 Gram(s) Oral once PRN Blood Glucose LESS THAN 70 milliGRAM(s)/deciliter  glucagon  Injectable 1 milliGRAM(s) IntraMuscular once PRN Glucose LESS THAN 70 milligrams/deciliter  ondansetron Injectable 8 milliGRAM(s) IV Push two times a day PRN Nausea and/or Vomiting      LABS                                            12.3                  Neurophils% (auto):   x      (05-08 @ 06:02):    7.5  )-----------(160          Lymphocytes% (auto):  x                                             38.0                   Eosinphils% (auto):   x        Manual%: Neutrophils x    ; Lymphocytes x    ; Eosinophils x    ; Bands%: x    ; Blasts x                                    144    |  108    |  19                  Calcium: 9.1   / iCa: x      (05-08 @ 06:00)    ----------------------------<  100       Magnesium: x                                3.5     |  22     |  1.50             Phosphorous: x        TPro  7.3    /  Alb  3.5    /  TBili  0.9    /  DBili  x      /  AST  21     /  ALT  15     /  AlkPhos  137    07 May 2019 23:35      Telemetry: CHB with alternating RBBB with LBBB, HR 30s to 60s    Echo: < from: Transthoracic Echocardiogram (05.08.19 @ 15:16) >    Conclusions:  Suboptimal apical windows.  Estimated LV ejection fraction 45%.  Eccentric mitral regurgitation directed posteriorly,  probably severe.  Moderate right ventricular enlargement. Study quality  precludes accurate assessment of right ventricular systolic  function.  Severe pulmonary hypertension. Estimated PASP = 75 mmHg.    < end of copied text >    Imaging  < from: CT Head No Cont (05.07.19 @ 21:50) >    FINDINGS:   Chronic right parietal lobe infarct.    There is no evidence of an acute intracranial hemorrhage, extra-axial   collection, mass effect, or midline shift. The basal cisterns are patent.   No evidence of downward herniation. No hydrocephalus.    The visualized paranasal sinuses and mastoid air cells are clear. The   globes are symmetric in size and contour.    No displaced calvarial fracture.       IMPRESSION:   No evidence of acute intracranial hemorrhage, midline shift, or   hydrocephalus.    Chronic right parietal lobe infarct.    < end of copied text >        ASSESSMENT & PLAN:  Pt is a 68 year old male PMHx of Non-Hodgkin's Lymphoma tx with chemotherapy in 2004, DM2 with CKD stage 3, HTN, CHF EF 35% on cardiac cath 12/2016, pleural effusion s/p left pleuracentesis in 10/2016, cardiomyopathy, gout and HLD presently admitted to CCU for complete Heart block with RBBB currently intubated and transvenously paced    #Neuro  - Intubated and sedated   - Versed & fentanyl gtt to titrate to a RASS of 0 to -1  - Urine drug screen negative  - Blood alcohol level negative  - Patient appeared encephalopathic on admission and throughout hospital stay. Per daughter he is A0X3 typically .   - Differential for Encephalopathy includes infectious vs PE vs hypoxemia. Unclear etiology as patient does not have a white count nor does he have a lactate, severe pulmonary HTN  and on echo and   - Neuro Following, CT head demonstrated old parietal infarct   - MRI brain w/wo gadolinium,  once STEPHEN resolves   - Routine EEG ordered per Neuro Recs     #Resp  - Severe pulmonary HTN on echo  - Several intermittent apneic events prior to intubation   -  post intubation ABG WNL      #CV  Complete Heart block   -hold all AV karen blocking agents  - will go for  cardiac MRI to r/o other cause of Non ischemic CMP once extubated   - Per EP pt will go for CRT-P vs CRT-D implant after neuro work-up/echo/MRI  - Hold antihypertensives for now, pt compensating well  - Observe with R2 pads  - TVP placed overnight with capture   - serial EKGs, monitor on tele    #Chronic systolic heart failure  - TTE EF 45%, likely severe MR, moderately enlarged RV , severe pulmonary hypertension. Estimated PASP = 75 mmHg.  - Medication non compliance  - Holding antihypertensives given CHB  - Cont spironolactone and Lasix 20 po qd with additional IVP PRN      #GI  - NPO for now  - No Known Active issues     #ID  - Absence of leukocytosis, absence of lactate   - Blood cultures pending     #Renal  -STEPHEN on CKD stage III  -STEPHEN likely pre-renal 2/2 to decreased cardiac output and perfusion   -Patient has mittal in place  - monitor I &O's, renally dose all meds, avoid nephrotoxic agents     #Heme  - Patient anemic on admission with hemoglobin of 10.5 from 12.3 on admission  - iron studies with evidence of GEORGETTE     #Endo  - Type 2 DM  - Insulin Sliding Scale , hypoglycemia protocol in place   - Fingerticks q6     #DVT PPx  - Heparin Sub Q    Ainsley Clay MD  [facility]   Pager ID #: [phone]   PGY1- Categorical  Medicine Intern CCU Accept Note    Transfer from: (X) Telemetry    Accepting physician: Bruna Medrano    HPI:  Pt is a 68 year old male PMHx of Non-Hodgkin's Lymphoma tx with chemotherapy in 2004, DM2 with CKD stage 3, HTN, CHF EF 35% on cardiac cath 12/2016, pleural effusion s/p left pleuracentesis in 10/2016, cardiomyopathy, gout and HLD presents to the ED sent in from cardiologists office because of EKG changes. Pt is a poor historian and is not sure why he's here, only alert to his name. He remarks that he retired a few months ago and he stopped taking his medications then because he "wasn't feeling good". He states he was with his wife earlier but then later he states she's in [place]. In ED denied chest pain, shortness of breath, palpitations, syncope, fevers, chills, recent travel or sick contacts. No new meds however he has stopped taking most of his meds over the past year. He overall, feels fine and is not sure why he is in the hospital.     Previous team had tried to reach his daughter but never received a call back. Pt's pharmacy called and stated pt picked up furosemide and irbersartan in April but has not picked up any other meds in months. Mentation in 2016 AAOX3.    In the ED, VSS. Labs at baseline, CT head with old stroke, Trops elevated and peaked at 50, now normal, EKG with TWI in lateral leads.     On the floor EKG abnormal found to have complete heart block with alternating RBBB with LBBB with HR 50's bpm. EP consulted and following. Previous cardiac workup, echo (Nov 2016) EF 44%, cardiac cath (Dec 2016) non obstructive CAD. Pt transferred to CCU2 for close observation with external pacer stand by in view of alternating bundle branch block. Cardiology followed the patient throughout his stay and noted, EKG to be abnormal with right bundle branch block 2-1 heart block possible periods of AV disassociation. Of note neurology consulted for AMS and recommend MRI head and routine EEG.     In CCU2 pt A&O x1, initially pleasant and asymptomatic CHB. Shortly after arrival pt became agitated, sitting at edge of the bed c/o nausea and wanting to go home because he had to vomit. Pt began pulling his leads, pacer pads and all monitoring devices off. He started getting dressed and appeared pale and short of breath. Security and management called to bedside as pt would not cooperate and does not have capacity. Pt's situation thoroughly explained by PA, cardiology fellow and nursing management without success. According to neuro note he endorses minimal alcohol intake and denies any illicit drug use however he now has mild tremors and is SOB and diaphoretic. Ativan given and CIWA protocol initiated. Pt now sedated on O2 for intermittent sats to 70s-80s.       SUBJECTIVE DATA:    OBJECTIVE DATA:    Vital Signs Last 24 Hrs  T(C): 36.4 (08 May 2019 21:00), Max: 36.7 (08 May 2019 14:58)  T(F): 97.6 (08 May 2019 21:00), Max: 98.1 (08 May 2019 14:58)  HR: 62 (08 May 2019 22:30) (50 - 62)  BP: 127/72 (08 May 2019 22:30) (127/72 - 167/79)  BP(mean): 95 (08 May 2019 22:30) (94 - 104)  RR: 20 (08 May 2019 22:30) (17 - 37)  SpO2: 93% (08 May 2019 22:30) (93% - 99%)  I&O's Summary    08 May 2019 07:01  -  08 May 2019 22:59  --------------------------------------------------------  IN: 600 mL / OUT: 150 mL / NET: 450 mL        Allergies:      MEDICATIONS  (STANDING):  allopurinol 300 milliGRAM(s) Oral daily  aspirin enteric coated 81 milliGRAM(s) Oral daily  dextrose 5%. 1000 milliLiter(s) (50 mL/Hr) IV Continuous <Continuous>  dextrose 50% Injectable 12.5 Gram(s) IV Push once  folic acid 1 milliGRAM(s) Oral daily  furosemide    Tablet 20 milliGRAM(s) Oral two times a day  haloperidol    Injectable 5 milliGRAM(s) IV Push once  haloperidol    Injectable 5 milliGRAM(s) IV Push once  heparin  Injectable 5000 Unit(s) SubCutaneous every 12 hours  insulin lispro (HumaLOG) corrective regimen sliding scale   SubCutaneous three times a day before meals  LORazepam   Injectable 1 milliGRAM(s) IV Push once  LORazepam   Injectable 2 milliGRAM(s) IV Push every 4 hours  LORazepam   Injectable   IV Push   losartan 50 milliGRAM(s) Oral daily  magnesium sulfate  IVPB 2 Gram(s) IV Intermittent once  potassium chloride  20 mEq/100 mL IVPB 20 milliEquivalent(s) IV Intermittent once  spironolactone 25 milliGRAM(s) Oral daily    MEDICATIONS  (PRN):  dextrose 40% Gel 15 Gram(s) Oral once PRN Blood Glucose LESS THAN 70 milliGRAM(s)/deciliter  glucagon  Injectable 1 milliGRAM(s) IntraMuscular once PRN Glucose LESS THAN 70 milligrams/deciliter  ondansetron Injectable 8 milliGRAM(s) IV Push two times a day PRN Nausea and/or Vomiting      LABS                                            12.3                  Neurophils% (auto):   x      (05-08 @ 06:02):    7.5  )-----------(160          Lymphocytes% (auto):  x                                             38.0                   Eosinphils% (auto):   x        Manual%: Neutrophils x    ; Lymphocytes x    ; Eosinophils x    ; Bands%: x    ; Blasts x                                    144    |  108    |  19                  Calcium: 9.1   / iCa: x      (05-08 @ 06:00)    ----------------------------<  100       Magnesium: x                                3.5     |  22     |  1.50             Phosphorous: x        TPro  7.3    /  Alb  3.5    /  TBili  0.9    /  DBili  x      /  AST  21     /  ALT  15     /  AlkPhos  137    07 May 2019 23:35      Telemetry: CHB with alternating RBBB with LBBB, HR 30s to 60s    Echo: < from: Transthoracic Echocardiogram (05.08.19 @ 15:16) >    Conclusions:  Suboptimal apical windows.  Estimated LV ejection fraction 45%.  Eccentric mitral regurgitation directed posteriorly,  probably severe.  Moderate right ventricular enlargement. Study quality  precludes accurate assessment of right ventricular systolic  function.  Severe pulmonary hypertension. Estimated PASP = 75 mmHg.    < end of copied text >    Imaging  < from: CT Head No Cont (05.07.19 @ 21:50) >    FINDINGS:   Chronic right parietal lobe infarct.    There is no evidence of an acute intracranial hemorrhage, extra-axial   collection, mass effect, or midline shift. The basal cisterns are patent.   No evidence of downward herniation. No hydrocephalus.    The visualized paranasal sinuses and mastoid air cells are clear. The   globes are symmetric in size and contour.    No displaced calvarial fracture.       IMPRESSION:   No evidence of acute intracranial hemorrhage, midline shift, or   hydrocephalus.    Chronic right parietal lobe infarct.    < end of copied text >        ASSESSMENT & PLAN:  Pt is a 68 year old male PMHx of Non-Hodgkin's Lymphoma tx with chemotherapy in 2004, DM2 with CKD stage 3, HTN, CHF EF 35% on cardiac cath 12/2016, pleural effusion s/p left pleuracentesis in 10/2016, cardiomyopathy, gout and HLD presently admitted to CCU for complete Heart block with RBBB currently intubated and transvenously paced    #Neuro  - Intubated and sedated   - Versed & fentanyl gtt to titrate to a RASS of 0 to -1  - Urine drug screen negative  - Blood alcohol level negative  - Patient appeared encephalopathic on admission and throughout hospital stay. Per daughter he is A0X3 typically .   - Differential for Encephalopathy includes infectious vs poor cerebral perfusion 2/2 to heart block  - MRI brain w/wo gadolinium,  once STEPHEN resolves   - Routine EEG ordered per Neuro Recs     #Resp  - Severe pulmonary HTN on echo  - Several intermittent apneic events prior to intubation   -  post intubation ABG WNL      #CV  Complete Heart block   -hold all AV karen blocking agents  - Per EP pt will go for CRT-P vs CRT-D implant after neuro work-up/echo/MRI  - Hold antihypertensives for now, pt compensating well  - Observe with R2 pads  - TVP placed overnight with capture   - will go for  cardiac MRI to r/o other cause of Non ischemic CMP at a later time once extubated and TVP is removed  - serial EKGs, monitor on tele    #Chronic systolic heart failure  - TTE EF 45%, likely severe MR, moderately enlarged RV , severe pulmonary hypertension. Estimated PASP = 75 mmHg.  - Medication non compliance  - Holding antihypertensives given CHB  - Will start 80 IV BID      #GI  - NPO for now  - No presence of transaminitis, however given likely right sided HF with significant leg swelling, JVP will further evaluate with liver ultrasound to rule out cardiac cirrhosis     #ID  - Absence of leukocytosis, absence of lactate   - Blood cultures pending     #Renal  -STEPHEN on CKD stage III  -STEPHEN likely pre-renal 2/2 to decreased cardiac output and perfusion   -Patient has mittal in place  -Daily BMP  -Monitor I &O's, renally dose all meds, avoid nephrotoxic agents     #Heme  - Patient anemic w/iron studies with evidence of GEORGETTE   - Continue to trend CBC daily     #Endo  - Type 2 DM  - Insulin Sliding Scale , hypoglycemia protocol in place   - Fingerticks q6     #DVT PPx  - Heparin Sub Q    Ainsley Clay MD  [facility]   Pager ID #: [phone]   PGY1- Categorical  Medicine Intern CCU Accept Note    Transfer from: (X) Telemetry    Accepting physician: Bruna Medrano    HPI:  Pt is a 68 year old male PMHx of Non-Hodgkin's Lymphoma tx with chemotherapy in 2004, DM2 with CKD stage 3, HTN, CHF EF 35% on cardiac cath 12/2016, pleural effusion s/p left pleuracentesis in 10/2016, cardiomyopathy, gout and HLD presents to the ED sent in from cardiologists office because of EKG changes. Pt is a poor historian and is not sure why he's here, only alert to his name. He remarks that he retired a few months ago and he stopped taking his medications then because he "wasn't feeling good". He states he was with his wife earlier but then later he states she's in [place]. In ED denied chest pain, shortness of breath, palpitations, syncope, fevers, chills, recent travel or sick contacts. No new meds however he has stopped taking most of his meds over the past year. He overall, feels fine and is not sure why he is in the hospital.     Previous team had tried to reach his daughter but never received a call back. Pt's pharmacy called and stated pt picked up furosemide and irbersartan in April but has not picked up any other meds in months. Mentation in 2016 AAOX3.    In the ED, VSS. Labs at baseline, CT head with old stroke, Trops elevated and peaked at 50, now normal, EKG with TWI in lateral leads.     On the floor EKG abnormal found to have complete heart block with alternating RBBB with LBBB with HR 50's bpm. EP consulted and following. Previous cardiac workup, echo (Nov 2016) EF 44%, cardiac cath (Dec 2016) non obstructive CAD. Pt transferred to CCU2 for close observation with external pacer stand by in view of alternating bundle branch block. Cardiology followed the patient throughout his stay and noted, EKG to be abnormal with right bundle branch block 2-1 heart block possible periods of AV disassociation. Of note neurology consulted for AMS and recommend MRI head and routine EEG.     In CCU2 pt A&O x1, initially pleasant and asymptomatic CHB. Shortly after arrival pt became agitated, sitting at edge of the bed c/o nausea and wanting to go home because he had to vomit. Pt began pulling his leads, pacer pads and all monitoring devices off. He started getting dressed and appeared pale and short of breath. Security and management called to bedside as pt would not cooperate and does not have capacity. Pt's situation thoroughly explained by PA, cardiology fellow and nursing management without success. According to neuro note he endorses minimal alcohol intake and denies any illicit drug use however he now has mild tremors and is SOB and diaphoretic. Ativan given and CIWA protocol initiated. Pt now sedated on O2 for intermittent sats to 70s-80s.       SUBJECTIVE DATA:    OBJECTIVE DATA:    Vital Signs Last 24 Hrs  T(C): 36.4 (08 May 2019 21:00), Max: 36.7 (08 May 2019 14:58)  T(F): 97.6 (08 May 2019 21:00), Max: 98.1 (08 May 2019 14:58)  HR: 62 (08 May 2019 22:30) (50 - 62)  BP: 127/72 (08 May 2019 22:30) (127/72 - 167/79)  BP(mean): 95 (08 May 2019 22:30) (94 - 104)  RR: 20 (08 May 2019 22:30) (17 - 37)  SpO2: 93% (08 May 2019 22:30) (93% - 99%)  I&O's Summary    08 May 2019 07:01  -  08 May 2019 22:59  --------------------------------------------------------  IN: 600 mL / OUT: 150 mL / NET: 450 mL        Allergies:      MEDICATIONS  (STANDING):  allopurinol 300 milliGRAM(s) Oral daily  aspirin enteric coated 81 milliGRAM(s) Oral daily  dextrose 5%. 1000 milliLiter(s) (50 mL/Hr) IV Continuous <Continuous>  dextrose 50% Injectable 12.5 Gram(s) IV Push once  folic acid 1 milliGRAM(s) Oral daily  furosemide    Tablet 20 milliGRAM(s) Oral two times a day  haloperidol    Injectable 5 milliGRAM(s) IV Push once  haloperidol    Injectable 5 milliGRAM(s) IV Push once  heparin  Injectable 5000 Unit(s) SubCutaneous every 12 hours  insulin lispro (HumaLOG) corrective regimen sliding scale   SubCutaneous three times a day before meals  LORazepam   Injectable 1 milliGRAM(s) IV Push once  LORazepam   Injectable 2 milliGRAM(s) IV Push every 4 hours  LORazepam   Injectable   IV Push   losartan 50 milliGRAM(s) Oral daily  magnesium sulfate  IVPB 2 Gram(s) IV Intermittent once  potassium chloride  20 mEq/100 mL IVPB 20 milliEquivalent(s) IV Intermittent once  spironolactone 25 milliGRAM(s) Oral daily    MEDICATIONS  (PRN):  dextrose 40% Gel 15 Gram(s) Oral once PRN Blood Glucose LESS THAN 70 milliGRAM(s)/deciliter  glucagon  Injectable 1 milliGRAM(s) IntraMuscular once PRN Glucose LESS THAN 70 milligrams/deciliter  ondansetron Injectable 8 milliGRAM(s) IV Push two times a day PRN Nausea and/or Vomiting      LABS                                            12.3                  Neurophils% (auto):   x      (05-08 @ 06:02):    7.5  )-----------(160          Lymphocytes% (auto):  x                                             38.0                   Eosinphils% (auto):   x        Manual%: Neutrophils x    ; Lymphocytes x    ; Eosinophils x    ; Bands%: x    ; Blasts x                                    144    |  108    |  19                  Calcium: 9.1   / iCa: x      (05-08 @ 06:00)    ----------------------------<  100       Magnesium: x                                3.5     |  22     |  1.50             Phosphorous: x        TPro  7.3    /  Alb  3.5    /  TBili  0.9    /  DBili  x      /  AST  21     /  ALT  15     /  AlkPhos  137    07 May 2019 23:35      Telemetry: CHB with alternating RBBB with LBBB, HR 30s to 60s    Echo: < from: Transthoracic Echocardiogram (05.08.19 @ 15:16) >    Conclusions:  Suboptimal apical windows.  Estimated LV ejection fraction 45%.  Eccentric mitral regurgitation directed posteriorly,  probably severe.  Moderate right ventricular enlargement. Study quality  precludes accurate assessment of right ventricular systolic  function.  Severe pulmonary hypertension. Estimated PASP = 75 mmHg.    < end of copied text >    Imaging  < from: CT Head No Cont (05.07.19 @ 21:50) >    FINDINGS:   Chronic right parietal lobe infarct.    There is no evidence of an acute intracranial hemorrhage, extra-axial   collection, mass effect, or midline shift. The basal cisterns are patent.   No evidence of downward herniation. No hydrocephalus.    The visualized paranasal sinuses and mastoid air cells are clear. The   globes are symmetric in size and contour.    No displaced calvarial fracture.       IMPRESSION:   No evidence of acute intracranial hemorrhage, midline shift, or   hydrocephalus.    Chronic right parietal lobe infarct.    < end of copied text >        ASSESSMENT & PLAN:  Pt is a 68 year old male PMHx of Non-Hodgkin's Lymphoma tx with chemotherapy in 2004, DM2 with CKD stage 3, HTN, CHF EF 35% on cardiac cath 12/2016, pleural effusion s/p left pleuracentesis in 10/2016, cardiomyopathy, gout and HLD presently admitted to CCU for complete Heart block with RBBB currently intubated and transvenously paced    #Neuro  - Intubated and sedated   - Versed & fentanyl gtt to titrate to a RASS of 0 to -1  - Urine drug screen negative  - Blood alcohol level negative  - Patient appeared encephalopathic on admission and throughout hospital stay. Per daughter he is A0X3 typically .   - Differential for Encephalopathy includes infectious vs poor cerebral perfusion 2/2 to heart block  - MRI brain w/wo gadolinium,  once STEPHEN resolves   - Routine EEG ordered per Neuro Recs     #Resp  - Severe pulmonary HTN on echo  - Several intermittent apneic events prior to intubation   -  post intubation ABG WNL      #CV  Complete Heart block   -hold all AV karen blocking agents  - Etiology of complete heart block unknown at this time: work up will include lyme titers and ischemic workup with heart cath for further eval  - Per EP pt will go for CRT-P vs CRT-D implant after neuro work-up/echo/MRI  - Hold antihypertensives for now, pt compensating well  - Observe with R2 pads  - TVP placed overnight with capture   - will go for  cardiac MRI to r/o other cause of Non ischemic CMP at a later time once extubated and TVP is removed  - serial EKGs, monitor on tele    #Chronic systolic heart failure  - TTE EF 45%, likely severe MR, moderately enlarged RV , severe pulmonary hypertension. Estimated PASP = 75 mmHg.  - Medication non compliance  - Holding antihypertensives given CHB  - Will start 80 IV BID      #GI  - NPO for now  - No presence of transaminitis, however given likely right sided HF with significant leg swelling, JVP will further evaluate with liver ultrasound to rule out cardiac cirrhosis     #ID  - Absence of leukocytosis, absence of lactate   - Blood cultures pending     #Renal  -STEPHEN on CKD stage III  -STEPHEN likely pre-renal 2/2 to decreased cardiac output and perfusion   -Patient has mittal in place  -Daily BMP  -Monitor I &O's, renally dose all meds, avoid nephrotoxic agents     #Heme  - Patient anemic w/iron studies with evidence of GEORGETTE   - Continue to trend CBC daily     #Endo  - Type 2 DM  - Insulin Sliding Scale , hypoglycemia protocol in place   - Fingerticks q6     #DVT PPx  - Heparin Sub Q    Ainsley Clay MD  [facility]   Pager ID #: [phone]   PGY1- Categorical  Medicine Intern

## 2019-05-08 NOTE — CONSULT NOTE ADULT - ASSESSMENT
1.confusion,, progressive change in mental status  2. History of cardiomyopathy with mild coronary artery disease  3. Significant conduction disease patient was seen by electrophysiology who feels he needs monitoring and possible pacemaker  4.  Chronic right parietal lobe infarct    Recommendations  Monitor heart rate closely  2-D echo  Consider MRI of the heart to rule out infiltrative disease  EP to follow  Neurology consultation read dementia workup.

## 2019-05-08 NOTE — H&P ADULT - NSHPSOCIALHISTORY_GEN_ALL_CORE
never smoker, denies drug use; last drink several weeks ago- drinks occasionally socially  unclear if he lives with wife? he states she lives in [place]?

## 2019-05-08 NOTE — H&P ADULT - NSICDXFAMILYHX_GEN_ALL_CORE_FT
FAMILY HISTORY:  Family history of CHF (congestive heart failure)    Sibling  Still living? Unknown  Family history of non-Hodgkin's lymphoma, Age at diagnosis: Age Unknown

## 2019-05-08 NOTE — H&P ADULT - NSHPPHYSICALEXAM_GEN_ALL_CORE
Vital Signs Last 24 Hrs  T(C): 36.4 (08 May 2019 03:59), Max: 36.6 (08 May 2019 00:00)  T(F): 97.6 (08 May 2019 03:59), Max: 97.9 (08 May 2019 00:00)  HR: 55 (08 May 2019 03:59) (55 - 64)  BP: 167/79 (08 May 2019 03:59) (160/82 - 167/79)  BP(mean): --  RR: 18 (08 May 2019 03:59) (17 - 18)  SpO2: 99% (08 May 2019 03:59) (94% - 99%)    GENERAL: NAD, well-developed  HEAD:  Atraumatic, Normocephalic  EYES: EOMI, PERRLA, conjunctiva and sclera clear  ENT: Pharynx not erythematous  PULMONARY: Clear to auscultation bilaterally; No wheeze  CARDIOVASCULAR: Regular rate and rhythm; No murmurs, rubs, or gallops  ABDOMEN: Soft, Nontender, Nondistended; Bowel sounds present  EXTREMITIES:  2+ Peripheral Pulses, + pitting edema up to the knee   MUSCULOSKELETAL: No calf tenderness  PSYCH: AAOx1, confused  NEUROLOGY: CN2-12 grossly intact, no gross focal sensory or motor deficits   SKIN: warm and dry, No rashes or lesions

## 2019-05-08 NOTE — H&P ADULT - NSHPREVIEWOFSYSTEMS_GEN_ALL_CORE
GENERAL: No fevers, no chills.  EYES: No blurry vision,  No photophobia  ENT: No sore throat.  No dysphagia  Cardiovascular: No chest pain, palpitations, orthopnea  Pulmonary: No cough, no wheezing. No shortness of breath  Gastrointestinal: No abdominal pain, no diarrhea, no constipation.   : No dysuria.  No hematuria  Musculoskeletal: No weakness.  No myalgias.  Dermatology:  No rashes.  Neuro: No Headache.  No vertigo.  No dizziness.

## 2019-05-08 NOTE — H&P ADULT - NSICDXPASTMEDICALHX_GEN_ALL_CORE_FT
PAST MEDICAL HISTORY:  DM type 2 (diabetes mellitus, type 2)     Essential hypertension     Moderate mitral regurgitation     Non-Hodgkins Lymphoma     Pleural effusion     Rhinitis, allergic     Systolic heart failure

## 2019-05-08 NOTE — H&P ADULT - PROBLEM SELECTOR PLAN 1
- ekg with TWI in lateral leads, trop peaked at 57; patient denies ever having chest pain  - ED has discussed with Dr. Loyd, no heparin drip for now  - repeat ekg  - c/w telemetry  - echocardiogram ordered

## 2019-05-08 NOTE — H&P ADULT - HISTORY OF PRESENT ILLNESS
** Patient poor historian **    Patient is a 68 year old Non-Hodgkin's Lymphoma tx with chemotherapy in 2004, DM2 with CKD stage 3, HTN, CHF EF 35% on cardiac cath 12/ 2016, pleural effusion s/p left pleuracentesis in 10/2016, cardiomyopathy, gout and HLD presents to the ED sent in from cardiologists office because of EKG changes. Patient is not sure why he is here. He was not sure where he was, why he is here or what even the year is. He remarks that he retired a few months ago and he stopped taking his medications then because he "wasn't feeling good". He states he was wife his wife earlier but then later he states shes in [place]. Patient currently denies chest pain, shortness of breath, palpitations, syncope, fevers, chills, recent travel or sick contacts. No new meds however he has stopped taking most of his meds. He overall, feels fine and is not sure why he is in the hospital.     It's very difficult to get a clear history from patient. I have tried to reach his daughter but have not received a call back as of yet. I have called [facility] patients pharmacy and he states patient picked up furosemide and irbersartan in April but has not picked up any other meds in months. Mentation in 2016 AAOX3.    In the ED, VSS. Labs at baseline, CT head with old stroke, Trops elevated and peaked at 50, now normal, EKG with TWI in lateral leads.

## 2019-05-08 NOTE — CONSULT NOTE ADULT - ASSESSMENT
Patient is a 68 year old man with PMH significant for non-Hodgkin lymphoma s/p chemo 2004, DM2 w/ CKD stage 3, CHF w/ EF 35% s/p cath 2016, cardiomyopathy, gout, and HLD presenting with confusion and altered mental status. Neurological physical exam is nonfocal and significant for orientation to person only and 0/3 memory. CBC, U/A, BMP are within normal limits. Differential includes dementia and delirium. Infectious causes are unlikely.      Plan:   - MRI head w/wo Gadolinium  - Reversible dementia workup (awaiting vit B12, TSH, RPR)  - Routine EEG

## 2019-05-08 NOTE — CONSULT NOTE ADULT - ASSESSMENT
5/8 as the above record indicates, the pt is here and is a very poor historian. I was asked by Dr Hawkins from oncology and by the pt's internist to see the pt as he did have a past of lymphoma. The oncology office will have to look up records as his history is not readily available. When I came to see the pt he was not able to supply much info about his cancer other than he had lymphoma and did not know the type or who treated him. Dr Hawkins thinks he may have been treated by his previous associate, Dr Sarmiento.     At the time of my visit the pt was alert and oriented but was not able to give specific details about any of his medical issues. His chemistries appeared all unremarkable and he was not febrile and his ct of the head showed nothing of note.  I will see if there are any records on his previous onc history and see if there is any reason to think it may have contributed to his present admit here.

## 2019-05-08 NOTE — CONSULT NOTE ADULT - SUBJECTIVE AND OBJECTIVE BOX
HPI:  ** Patient poor historian **    Patient is a 68 year old Non-Hodgkin's Lymphoma tx with chemotherapy in 2004, DM2 with CKD stage 3, HTN, CHF EF 35% on cardiac cath 12/ 2016, pleural effusion s/p left pleuracentesis in 10/2016, cardiomyopathy, gout and HLD presents to the ED sent in from cardiologists office because of EKG changes. Patient is not sure why he is here. He was not sure where he was, why he is here or what even the year is. He remarks that he retired a few months ago and he stopped taking his medications then because he "wasn't feeling good". He states he was wife his wife earlier but then later he states shes in [place]. Patient currently denies chest pain, shortness of breath, palpitations, syncope, fevers, chills, recent travel or sick contacts. No new meds however he has stopped taking most of his meds. He overall, feels fine and is not sure why he is in the hospital.     It's very difficult to get a clear history from patient. I have tried to reach his daughter but have not received a call back as of yet. I have called [facility] patients pharmacy and he states patient picked up furosemide and irbersartan in April but has not picked up any other meds in months. Mentation in 2016 AAOX3.    In the ED, VSS. Labs at baseline, CT head with old stroke, Trops elevated and peaked at 50, now normal, EKG with TWI in lateral leads.     patient is morning  is confused but awake and alert. He denies dizziness chest pain palpitations  EKG is abnormal with right bundle branch block 2-1 heart block possible periods of AV disassociation  Previous cardiac workup mild coronary disease in 2016 with moderate global LV dysfunction on echocardiography      MEDICATIONS:  MEDICATIONS  (STANDING):  allopurinol 300 milliGRAM(s) Oral daily  aspirin enteric coated 81 milliGRAM(s) Oral daily  dextrose 5%. 1000 milliLiter(s) (50 mL/Hr) IV Continuous <Continuous>  dextrose 50% Injectable 12.5 Gram(s) IV Push once  furosemide    Tablet 20 milliGRAM(s) Oral two times a day  heparin  Injectable 5000 Unit(s) SubCutaneous every 12 hours  insulin lispro (HumaLOG) corrective regimen sliding scale   SubCutaneous three times a day before meals  losartan 50 milliGRAM(s) Oral daily  spironolactone 25 milliGRAM(s) Oral daily      PHYSICAL EXAM:  T(C): 36.4 (05-08-19 @ 17:34), Max: 36.7 (05-08-19 @ 14:58)  HR: 50 (05-08-19 @ 17:34) (50 - 64)  BP: 140/72 (05-08-19 @ 17:34) (140/72 - 167/79)  RR: 18 (05-08-19 @ 17:34) (17 - 19)  SpO2: 98% (05-08-19 @ 17:34) (94% - 99%)  Wt(kg): --  I&O's Summary    08 May 2019 07:01  -  08 May 2019 17:46  --------------------------------------------------------  IN: 600 mL / OUT: 0 mL / NET: 600 mL      Height (cm): 182.88 (05-08 @ 03:59)  Weight (kg): 97.1 (05-08 @ 03:59)  BMI (kg/m2): 29 (05-08 @ 03:59)  BSA (m2): 2.19 (05-08 @ 03:59)    Appearance: Normalaawake and alert but appears confusedhis name and date of birth, not sure why he is in the hospital	  HEENT:   Normal oral mucosa, PERRL, EOMI	  Cardiovascular: Normal S1 S2, No JVD, No murmurs ,  Respiratory: Lungs clear to auscultation, normal effort 	  Gastrointestinal:  Soft, Non-tender, + BS	  Skin: No rashes, No ecchymoses, No cyanosis, warm to touch  Musculoskeletal: Normal range of motion, normal strength  Psychiatry:  Mood & affect appropriate  Ext: trace to 1+ edema  Peripheral pulses palpable 2+ bilaterally      LABS:    CARDIAC MARKERS:  CARDIAC MARKERS ( 07 May 2019 23:35 )  x     / x     / 85 U/L / x     / 2.8 ng/mL                                12.3   7.5   )-----------( 160      ( 08 May 2019 06:02 )             38.0     05-08    144  |  108  |  19  ----------------------------<  100<H>  3.5   |  22  |  1.50<H>    Ca    9.1      08 May 2019 06:00  Phos  3.8     05-07  Mg     1.9     05-07    TPro  7.3  /  Alb  3.5  /  TBili  0.9  /  DBili  x   /  AST  21  /  ALT  15  /  AlkPhos  137<H>  05-07    proBNP:   Lipid Profile:   HgA1c:   TSH: Thyroid Stimulating Hormone, Serum: 1.08 uIU/mL (05-08 @ 15:51)            TELEMETRY: 	    ECG:  NSR periods of 2:1 heart block RBBB periords of av dissociation	  RADIOLOGY:   < from: Xray Chest 2 Views PA/Lat (05.07.19 @ 23:15) >  IMPRESSION:    Stable left pleural effusion and loculated right intrafissural fluid,   similar in appearance to the 11/27/2016 radiographs and CT.    < from: Transthoracic Echocardiogram (11.28.16 @ 14:15) >  Conclusions:  1. MIldly tethered mitral valve. Moderate mitral  regurgitation.  2. Mildly dilated left atrium.  LA volume index = 44 cc/m2.  3. Mild concentric left ventricular hypertrophy.  4. Moderate global left ventricular systolic dysfunction.  Endocardial visualization enhanced with intravenous  injection of echo contrast (Definity).  ------------------------------------------------------------------------  Confirmed on  11/28/2016 - 18:17:40 by Dot Lagos M.D.  ------------------------------------------------------------------------    repeat echo pending  < from: CT Head No Cont (05.07.19 @ 21:50) >  INDINGS:   Chronic right parietal lobe infarct.    There is no evidence of an acute intracranial hemorrhage, extra-axial   collection, mass effect, or midline shift. The basal cisterns are patent.   No evidence of downward herniation. No hydrocephalus.    The visualized paranasal sinuses and mastoid air cells are clear. The   globes are symmetric in size and contour.    No displaced calvarial fracture.       IMPRESSION:   No evidence of acute intracranial hemorrhage, midline shift, or   hydrocephalus.    Chronic right parietal lobe infarct.

## 2019-05-08 NOTE — ED ADULT NURSE NOTE - OBJECTIVE STATEMENT
69y/o male BIBEMS from PCP for changes in behavior. Patient currently a&ox3. As per EMS, patient was sent in by PCP who reported patient "seems different from the last visit." EMS reports patient was agitated in PCP office and has been having flight of ideas along with difficulty focusing. As per EMS, patient saw radiologist this week and was told to come to ED for EKG changes. Patient currently calm and cooperative, unsure why he is in ED. No complaints at this time. Patient has to be redirected frequently and displays flight of ideas. Also providing inconsistent and inaccurate medical history. Lungs clear b/l. Abd soft, nontender, nondistended. PERRL. VAUGHAN x3, ambulated with steady gait. MD Berger at bedside for eval.

## 2019-05-09 DIAGNOSIS — I34.0 NONRHEUMATIC MITRAL (VALVE) INSUFFICIENCY: ICD-10-CM

## 2019-05-09 LAB
ALBUMIN SERPL ELPH-MCNC: 2.8 G/DL — LOW (ref 3.3–5)
ALBUMIN SERPL ELPH-MCNC: 2.9 G/DL — LOW (ref 3.3–5)
ALP SERPL-CCNC: 120 U/L — SIGNIFICANT CHANGE UP (ref 40–120)
ALP SERPL-CCNC: 126 U/L — HIGH (ref 40–120)
ALT FLD-CCNC: 10 U/L — SIGNIFICANT CHANGE UP (ref 10–45)
ALT FLD-CCNC: 9 U/L — LOW (ref 10–45)
ANION GAP SERPL CALC-SCNC: 10 MMOL/L — SIGNIFICANT CHANGE UP (ref 5–17)
ANION GAP SERPL CALC-SCNC: 11 MMOL/L — SIGNIFICANT CHANGE UP (ref 5–17)
AST SERPL-CCNC: 10 U/L — SIGNIFICANT CHANGE UP (ref 10–40)
AST SERPL-CCNC: 16 U/L — SIGNIFICANT CHANGE UP (ref 10–40)
B BURGDOR C6 AB SER-ACNC: NEGATIVE — SIGNIFICANT CHANGE UP
B BURGDOR IGG+IGM SER-ACNC: 0.05 INDEX — SIGNIFICANT CHANGE UP (ref 0.01–0.89)
BASE EXCESS BLDV CALC-SCNC: 1.1 MMOL/L — SIGNIFICANT CHANGE UP (ref -2–2)
BASOPHILS # BLD AUTO: 0 K/UL — SIGNIFICANT CHANGE UP (ref 0–0.2)
BASOPHILS # BLD AUTO: 0 K/UL — SIGNIFICANT CHANGE UP (ref 0–0.2)
BASOPHILS NFR BLD AUTO: 0.2 % — SIGNIFICANT CHANGE UP (ref 0–2)
BASOPHILS NFR BLD AUTO: 0.4 % — SIGNIFICANT CHANGE UP (ref 0–2)
BILIRUB SERPL-MCNC: 0.6 MG/DL — SIGNIFICANT CHANGE UP (ref 0.2–1.2)
BILIRUB SERPL-MCNC: 0.6 MG/DL — SIGNIFICANT CHANGE UP (ref 0.2–1.2)
BUN SERPL-MCNC: 25 MG/DL — HIGH (ref 7–23)
BUN SERPL-MCNC: 26 MG/DL — HIGH (ref 7–23)
CALCIUM SERPL-MCNC: 8.4 MG/DL — SIGNIFICANT CHANGE UP (ref 8.4–10.5)
CALCIUM SERPL-MCNC: 8.4 MG/DL — SIGNIFICANT CHANGE UP (ref 8.4–10.5)
CHLORIDE SERPL-SCNC: 108 MMOL/L — SIGNIFICANT CHANGE UP (ref 96–108)
CHLORIDE SERPL-SCNC: 110 MMOL/L — HIGH (ref 96–108)
CK SERPL-CCNC: 75 U/L — SIGNIFICANT CHANGE UP (ref 30–200)
CO2 BLDV-SCNC: 28 MMOL/L — SIGNIFICANT CHANGE UP (ref 22–30)
CO2 SERPL-SCNC: 22 MMOL/L — SIGNIFICANT CHANGE UP (ref 22–31)
CO2 SERPL-SCNC: 23 MMOL/L — SIGNIFICANT CHANGE UP (ref 22–31)
CREAT SERPL-MCNC: 1.68 MG/DL — HIGH (ref 0.5–1.3)
CREAT SERPL-MCNC: 1.82 MG/DL — HIGH (ref 0.5–1.3)
EOSINOPHIL # BLD AUTO: 0 K/UL — SIGNIFICANT CHANGE UP (ref 0–0.5)
EOSINOPHIL # BLD AUTO: 0.1 K/UL — SIGNIFICANT CHANGE UP (ref 0–0.5)
EOSINOPHIL NFR BLD AUTO: 0.5 % — SIGNIFICANT CHANGE UP (ref 0–6)
EOSINOPHIL NFR BLD AUTO: 0.7 % — SIGNIFICANT CHANGE UP (ref 0–6)
GAS PNL BLDA: SIGNIFICANT CHANGE UP
GAS PNL BLDA: SIGNIFICANT CHANGE UP
GAS PNL BLDV: SIGNIFICANT CHANGE UP
GLUCOSE BLDC GLUCOMTR-MCNC: 112 MG/DL — HIGH (ref 70–99)
GLUCOSE BLDC GLUCOMTR-MCNC: 69 MG/DL — LOW (ref 70–99)
GLUCOSE BLDC GLUCOMTR-MCNC: 80 MG/DL — SIGNIFICANT CHANGE UP (ref 70–99)
GLUCOSE BLDC GLUCOMTR-MCNC: 84 MG/DL — SIGNIFICANT CHANGE UP (ref 70–99)
GLUCOSE BLDC GLUCOMTR-MCNC: 88 MG/DL — SIGNIFICANT CHANGE UP (ref 70–99)
GLUCOSE SERPL-MCNC: 102 MG/DL — HIGH (ref 70–99)
GLUCOSE SERPL-MCNC: 150 MG/DL — HIGH (ref 70–99)
HAPTOGLOB SERPL-MCNC: 162 MG/DL — SIGNIFICANT CHANGE UP (ref 34–200)
HBA1C BLD-MCNC: 6 % — HIGH (ref 4–5.6)
HCO3 BLDV-SCNC: 27 MMOL/L — SIGNIFICANT CHANGE UP (ref 21–29)
HCT VFR BLD CALC: 32 % — LOW (ref 39–50)
HCT VFR BLD CALC: 32 % — LOW (ref 39–50)
HGB BLD-MCNC: 10.4 G/DL — LOW (ref 13–17)
HGB BLD-MCNC: 10.5 G/DL — LOW (ref 13–17)
HOROWITZ INDEX BLDV+IHG-RTO: 35 — SIGNIFICANT CHANGE UP
LDH SERPL L TO P-CCNC: 407 U/L — HIGH (ref 50–242)
LYMPHOCYTES # BLD AUTO: 0.5 K/UL — LOW (ref 1–3.3)
LYMPHOCYTES # BLD AUTO: 0.8 K/UL — LOW (ref 1–3.3)
LYMPHOCYTES # BLD AUTO: 11 % — LOW (ref 13–44)
LYMPHOCYTES # BLD AUTO: 6.9 % — LOW (ref 13–44)
MAGNESIUM SERPL-MCNC: 2 MG/DL — SIGNIFICANT CHANGE UP (ref 1.6–2.6)
MAGNESIUM SERPL-MCNC: 2.1 MG/DL — SIGNIFICANT CHANGE UP (ref 1.6–2.6)
MCHC RBC-ENTMCNC: 29.2 PG — SIGNIFICANT CHANGE UP (ref 27–34)
MCHC RBC-ENTMCNC: 29.6 PG — SIGNIFICANT CHANGE UP (ref 27–34)
MCHC RBC-ENTMCNC: 32.5 GM/DL — SIGNIFICANT CHANGE UP (ref 32–36)
MCHC RBC-ENTMCNC: 32.9 GM/DL — SIGNIFICANT CHANGE UP (ref 32–36)
MCV RBC AUTO: 89.8 FL — SIGNIFICANT CHANGE UP (ref 80–100)
MCV RBC AUTO: 90 FL — SIGNIFICANT CHANGE UP (ref 80–100)
MONOCYTES # BLD AUTO: 0.4 K/UL — SIGNIFICANT CHANGE UP (ref 0–0.9)
MONOCYTES # BLD AUTO: 0.4 K/UL — SIGNIFICANT CHANGE UP (ref 0–0.9)
MONOCYTES NFR BLD AUTO: 5.4 % — SIGNIFICANT CHANGE UP (ref 2–14)
MONOCYTES NFR BLD AUTO: 6.2 % — SIGNIFICANT CHANGE UP (ref 2–14)
NEUTROPHILS # BLD AUTO: 5.7 K/UL — SIGNIFICANT CHANGE UP (ref 1.8–7.4)
NEUTROPHILS # BLD AUTO: 5.8 K/UL — SIGNIFICANT CHANGE UP (ref 1.8–7.4)
NEUTROPHILS NFR BLD AUTO: 81.9 % — HIGH (ref 43–77)
NEUTROPHILS NFR BLD AUTO: 86.8 % — HIGH (ref 43–77)
PCO2 BLDV: 52 MMHG — HIGH (ref 35–50)
PH BLDV: 7.34 — LOW (ref 7.35–7.45)
PHOSPHATE SERPL-MCNC: 4.7 MG/DL — HIGH (ref 2.5–4.5)
PHOSPHATE SERPL-MCNC: 5 MG/DL — HIGH (ref 2.5–4.5)
PLATELET # BLD AUTO: 141 K/UL — LOW (ref 150–400)
PLATELET # BLD AUTO: 145 K/UL — LOW (ref 150–400)
PO2 BLDV: 50 MMHG — HIGH (ref 25–45)
POTASSIUM SERPL-MCNC: 4 MMOL/L — SIGNIFICANT CHANGE UP (ref 3.5–5.3)
POTASSIUM SERPL-MCNC: 4.4 MMOL/L — SIGNIFICANT CHANGE UP (ref 3.5–5.3)
POTASSIUM SERPL-SCNC: 4 MMOL/L — SIGNIFICANT CHANGE UP (ref 3.5–5.3)
POTASSIUM SERPL-SCNC: 4.4 MMOL/L — SIGNIFICANT CHANGE UP (ref 3.5–5.3)
PROT SERPL-MCNC: 5.9 G/DL — LOW (ref 6–8.3)
PROT SERPL-MCNC: 6 G/DL — SIGNIFICANT CHANGE UP (ref 6–8.3)
RBC # BLD: 3.55 M/UL — LOW (ref 4.2–5.8)
RBC # BLD: 3.57 M/UL — LOW (ref 4.2–5.8)
RBC # FLD: 15.2 % — HIGH (ref 10.3–14.5)
RBC # FLD: 15.3 % — HIGH (ref 10.3–14.5)
SAO2 % BLDV: 79 % — SIGNIFICANT CHANGE UP (ref 67–88)
SODIUM SERPL-SCNC: 140 MMOL/L — SIGNIFICANT CHANGE UP (ref 135–145)
SODIUM SERPL-SCNC: 144 MMOL/L — SIGNIFICANT CHANGE UP (ref 135–145)
WBC # BLD: 6.5 K/UL — SIGNIFICANT CHANGE UP (ref 3.8–10.5)
WBC # BLD: 7.1 K/UL — SIGNIFICANT CHANGE UP (ref 3.8–10.5)
WBC # FLD AUTO: 6.5 K/UL — SIGNIFICANT CHANGE UP (ref 3.8–10.5)
WBC # FLD AUTO: 7.1 K/UL — SIGNIFICANT CHANGE UP (ref 3.8–10.5)

## 2019-05-09 PROCEDURE — 71045 X-RAY EXAM CHEST 1 VIEW: CPT | Mod: 26

## 2019-05-09 PROCEDURE — 99222 1ST HOSP IP/OBS MODERATE 55: CPT

## 2019-05-09 PROCEDURE — 95951: CPT | Mod: 26

## 2019-05-09 PROCEDURE — 95819 EEG AWAKE AND ASLEEP: CPT | Mod: 26

## 2019-05-09 PROCEDURE — 76700 US EXAM ABDOM COMPLETE: CPT | Mod: 26

## 2019-05-09 PROCEDURE — 99291 CRITICAL CARE FIRST HOUR: CPT

## 2019-05-09 PROCEDURE — 99223 1ST HOSP IP/OBS HIGH 75: CPT

## 2019-05-09 RX ORDER — FENTANYL CITRATE 50 UG/ML
0.5 INJECTION INTRAVENOUS
Qty: 5000 | Refills: 0 | Status: DISCONTINUED | OUTPATIENT
Start: 2019-05-09 | End: 2019-05-11

## 2019-05-09 RX ORDER — ACETAMINOPHEN 500 MG
1000 TABLET ORAL ONCE
Refills: 0 | Status: COMPLETED | OUTPATIENT
Start: 2019-05-09 | End: 2019-05-09

## 2019-05-09 RX ORDER — SODIUM CHLORIDE 9 MG/ML
10 INJECTION INTRAMUSCULAR; INTRAVENOUS; SUBCUTANEOUS
Refills: 0 | Status: DISCONTINUED | OUTPATIENT
Start: 2019-05-09 | End: 2019-05-24

## 2019-05-09 RX ORDER — MIDAZOLAM HYDROCHLORIDE 1 MG/ML
0.02 INJECTION, SOLUTION INTRAMUSCULAR; INTRAVENOUS
Qty: 100 | Refills: 0 | Status: DISCONTINUED | OUTPATIENT
Start: 2019-05-09 | End: 2019-05-11

## 2019-05-09 RX ORDER — VANCOMYCIN HCL 1 G
1000 VIAL (EA) INTRAVENOUS EVERY 12 HOURS
Refills: 0 | Status: DISCONTINUED | OUTPATIENT
Start: 2019-05-09 | End: 2019-05-12

## 2019-05-09 RX ORDER — FUROSEMIDE 40 MG
80 TABLET ORAL
Refills: 0 | Status: DISCONTINUED | OUTPATIENT
Start: 2019-05-09 | End: 2019-05-10

## 2019-05-09 RX ORDER — FUROSEMIDE 40 MG
40 TABLET ORAL
Refills: 0 | Status: DISCONTINUED | OUTPATIENT
Start: 2019-05-09 | End: 2019-05-09

## 2019-05-09 RX ORDER — PIPERACILLIN AND TAZOBACTAM 4; .5 G/20ML; G/20ML
3.38 INJECTION, POWDER, LYOPHILIZED, FOR SOLUTION INTRAVENOUS EVERY 8 HOURS
Refills: 0 | Status: DISCONTINUED | OUTPATIENT
Start: 2019-05-09 | End: 2019-05-14

## 2019-05-09 RX ORDER — FENTANYL CITRATE 50 UG/ML
0.5 INJECTION INTRAVENOUS
Qty: 2500 | Refills: 0 | Status: DISCONTINUED | OUTPATIENT
Start: 2019-05-09 | End: 2019-05-09

## 2019-05-09 RX ADMIN — Medication 400 MILLIGRAM(S): at 22:50

## 2019-05-09 RX ADMIN — FENTANYL CITRATE 2.43 MICROGRAM(S)/KG/HR: 50 INJECTION INTRAVENOUS at 04:07

## 2019-05-09 RX ADMIN — Medication 81 MILLIGRAM(S): at 08:18

## 2019-05-09 RX ADMIN — FENTANYL CITRATE 2.43 MICROGRAM(S)/KG/HR: 50 INJECTION INTRAVENOUS at 07:01

## 2019-05-09 RX ADMIN — Medication 1 MILLIGRAM(S): at 00:05

## 2019-05-09 RX ADMIN — Medication 40 MILLIGRAM(S): at 01:50

## 2019-05-09 RX ADMIN — HEPARIN SODIUM 5000 UNIT(S): 5000 INJECTION INTRAVENOUS; SUBCUTANEOUS at 17:18

## 2019-05-09 RX ADMIN — Medication 300 MILLIGRAM(S): at 08:18

## 2019-05-09 RX ADMIN — Medication 80 MILLIGRAM(S): at 09:55

## 2019-05-09 RX ADMIN — Medication 80 MILLIGRAM(S): at 23:03

## 2019-05-09 RX ADMIN — Medication 1 MILLIGRAM(S): at 08:18

## 2019-05-09 RX ADMIN — MIDAZOLAM HYDROCHLORIDE 1.94 MG/KG/HR: 1 INJECTION, SOLUTION INTRAMUSCULAR; INTRAVENOUS at 04:07

## 2019-05-09 RX ADMIN — MIDAZOLAM HYDROCHLORIDE 1.94 MG/KG/HR: 1 INJECTION, SOLUTION INTRAMUSCULAR; INTRAVENOUS at 07:01

## 2019-05-09 RX ADMIN — Medication 2 MILLIGRAM(S): at 01:49

## 2019-05-09 RX ADMIN — HEPARIN SODIUM 5000 UNIT(S): 5000 INJECTION INTRAVENOUS; SUBCUTANEOUS at 06:18

## 2019-05-09 NOTE — PROGRESS NOTE ADULT - ASSESSMENT
1. Advance conduction disease and a temporary pacemaker  2. Status post intubation for apnea, hypoxia bradycardia  3. Echo suggestive of possible moderate to severe mitral regurgitation and severe pulmonary hypertension  but suboptimal study  4. Progressive dementia confusion    Recommendations  patient will need  permanent pacemaker  To better assess LV function mitral disease will probably need transesophageal echocardiogram  Presently hemodynamically stable with intubation sedation

## 2019-05-09 NOTE — PROGRESS NOTE ADULT - SUBJECTIVE AND OBJECTIVE BOX
Patient is a 68y old  Male who presents with a chief complaint of confusion (08 May 2019 12:11)      HPI:  ** Patient poor historian **    Patient is a 68 year old Non-Hodgkin's Lymphoma tx with chemotherapy in , DM2 with CKD stage 3, HTN, CHF EF 35% on cardiac cath 2016, pleural effusion s/p left pleuracentesis in 10/2016, cardiomyopathy, gout and HLD presents to the ED sent in from cardiologists office because of EKG changes. Patient is not sure why he is here. He was not sure where he was, why he is here or what even the year is. He remarks that he retired a few months ago and he stopped taking his medications then because he "wasn't feeling good". He states he was wife his wife earlier but then later he states shes in [place]. Patient currently denies chest pain, shortness of breath, palpitations, syncope, fevers, chills, recent travel or sick contacts. No new meds however he has stopped taking most of his meds. He overall, feels fine and is not sure why he is in the hospital.     It's very difficult to get a clear history from patient. I have tried to reach his daughter but have not received a call back as of yet. I have called [facility] patients pharmacy and he states patient picked up furosemide and irbersartan in April but has not picked up any other meds in months. Mentation in  AAOX3.    In the ED, VSS. Labs at baseline, CT head with old stroke, Trops elevated and peaked at 50, now normal, EKG with TWI in lateral leads. (08 May 2019 10:33)    5 as the above record indicates, the pt is here and is a very poor historian. I was asked by Dr Hawkins from oncology and by the pt's internist to see the pt as he did have a past of lymphoma. The oncology office will have to look up records as his history is not readily available. When I came to see the pt he was not able to supply much info about his cancer other than he had lymphoma and did not know the type or who treated him. Dr Hawkins thinks he may have been treated by his previous associate, Dr Sarmiento.     At the time of my visit the pt was alert and oriented but was not able to give specific details about any of his medical issues. His chemistries appeared all unremarkable and he was not febrile and his ct of the head showed nothing of note.  I will see if there are any records on his previous onc history and see if there is any reason to think it may have contributed to his present admit here.  events of last day was noted and pt was intubated and had heart failure. 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 [place] [facility] [place] a recurrence with BR and he has remained in remission.      MEDICATIONS  (STANDING):  allopurinol 300 milliGRAM(s) Oral daily  aspirin enteric coated 81 milliGRAM(s) Oral daily  dextrose 5%. 1000 milliLiter(s) (50 mL/Hr) IV Continuous <Continuous>  dextrose 50% Injectable 12.5 Gram(s) IV Push once  furosemide    Tablet 20 milliGRAM(s) Oral two times a day  heparin  Injectable 5000 Unit(s) SubCutaneous every 12 hours  insulin lispro (HumaLOG) corrective regimen sliding scale   SubCutaneous three times a day before meals  losartan 50 milliGRAM(s) Oral daily  metoprolol succinate ER 25 milliGRAM(s) Oral daily  spironolactone 25 milliGRAM(s) Oral daily    MEDICATIONS  (PRN):  dextrose 40% Gel 15 Gram(s) Oral once PRN Blood Glucose LESS THAN 70 milliGRAM(s)/deciliter  glucagon  Injectable 1 milliGRAM(s) IntraMuscular once PRN Glucose LESS THAN 70 milligrams/deciliter      T(F): 97.6 (19 @ 03:59), Max: 97.9 (19 @ 00:00)  HR: 55 (19 @ 03:59) (55 - 64)  BP: 167/79 (19 @ 03:59) (160/82 - 167/79)  RR: 18 (19 @ 03:59) (17 - 18)  SpO2: 99% (19 @ 03:59) (94% - 99%)    LABS:                        10.4   7.1   )-----------( 141      ( 09 May 2019 09:58 )             32.0     -    144  |  108  |  19  ----------------------------<  100<H>  3.5   |  22  |  1.50<H>    Ca    9.1      08 May 2019 06:00  Phos  3.8     05-  Mg     1.9     05-07    TPro  7.3  /  Alb  3.5  /  TBili  0.9  /  DBili  x   /  AST  21  /  ALT  15  /  AlkPhos  137<H>  05-07      Urinalysis Basic - ( 08 May 2019 00:17 )    Color: Yellow / Appearance: Slightly Turbid / S.023 / pH: x  Gluc: x / Ketone: Negative  / Bili: Small / Urobili: 3 mg/dL   Blood: x / Protein: 300 mg/dL / Nitrite: Negative   Leuk Esterase: Negative / RBC: 2 /hpf / WBC 7 /HPF   Sq Epi: x / Non Sq Epi: 1 /hpf / Bacteria: Negative        ROS:  Negative except for:    PAST MEDICAL & SURGICAL HISTORY:  Pleural effusion  Moderate mitral regurgitation  Systolic heart failure  Rhinitis, allergic  Essential hypertension  DM type 2 (diabetes mellitus, type 2)  Non-Hodgkins Lymphoma  Non-Hodgkin lymphoma: h/o axillary dissection      SOCIAL HISTORY:    FAMILY HISTORY:  Family history of non-Hodgkin's lymphoma (Sibling)  Family history of CHF (congestive heart failure)      Allergies    No Known Allergies    Intolerances        PHYSICAL EXAM  General: adult in NAD  HEENT: clear oropharynx, anicteric sclera, pink conjunctivae  Neck: supple  CV: normal S1S2 with no murmur rubs or gallops  Lungs: clear to auscultation, no wheezes, no rales  Abdomen: soft non-tender non-distended, no hepatosplenomegaly  Ext: no clubbing cyanosis or edema  Skin: no rashes and no petechiae  Neuro: alert and oriented X3 no focal deficits    BLOOD SMEAR INTERPRETATION:    RADIOLOGY :

## 2019-05-09 NOTE — PROGRESS NOTE ADULT - SUBJECTIVE AND OBJECTIVE BOX
Subjective: Patient seen and examined. No new events except as noted.   Events of last night noted. Patient apparently became apneic, confused, varying heart rates. He was intubated, temporary pacemaker placed  Presently he is in the CCU with temporary pacemaker intubated and sedated  MEDICATIONS:  MEDICATIONS  (STANDING):  allopurinol 300 milliGRAM(s) Oral daily  aspirin enteric coated 81 milliGRAM(s) Oral daily  dextrose 5%. 1000 milliLiter(s) (50 mL/Hr) IV Continuous <Continuous>  dextrose 50% Injectable 12.5 Gram(s) IV Push once  fentaNYL   Infusion. 0.5 MICROgram(s)/kG/Hr (2.427 mL/Hr) IV Continuous <Continuous>  folic acid 1 milliGRAM(s) Oral daily  furosemide   Injectable 80 milliGRAM(s) IV Push two times a day  heparin  Injectable 5000 Unit(s) SubCutaneous every 12 hours  insulin lispro (HumaLOG) corrective regimen sliding scale   SubCutaneous three times a day before meals  midazolam Infusion 0.02 mG/kG/Hr (1.942 mL/Hr) IV Continuous <Continuous>      PHYSICAL EXAM:  T(C): 36.7 (05-09-19 @ 11:00), Max: 37.2 (05-09-19 @ 04:00)  HR: 60 (05-09-19 @ 12:27) (38 - 80)  BP: 104/60 (05-09-19 @ 12:00) (83/65 - 180/87)  RR: 16 (05-09-19 @ 12:00) (0 - 37)  SpO2: 97% (05-09-19 @ 12:27) (91% - 100%)  Wt(kg): --  I&O's Summary    08 May 2019 07:01  -  09 May 2019 07:00  --------------------------------------------------------  IN: 685.8 mL / OUT: 585 mL / NET: 100.8 mL    09 May 2019 07:01  -  09 May 2019 12:34  --------------------------------------------------------  IN: 125.2 mL / OUT: 665 mL / NET: -539.8 mL          Appearance: Normal	intubated sedated  HEENT:   Normal oral mucosa, PERRL, EOMI	  Cardiovascular: Normal S1 S2, No JVD, No murmurs ,  Respiratory: Lungs decreased breath sounds bilaterally  Ext: No edema  Peripheral pulses palpable 2+ bilaterally      LABS:    CARDIAC MARKERS:  CARDIAC MARKERS ( 09 May 2019 05:09 )  x     / x     / 75 U/L / x     / x      CARDIAC MARKERS ( 07 May 2019 23:35 )  x     / x     / 85 U/L / x     / 2.8 ng/mL                                10.4   7.1   )-----------( 141      ( 09 May 2019 09:58 )             32.0     05-09    144  |  110<H>  |  25<H>  ----------------------------<  102<H>  4.0   |  23  |  1.68<H>    Ca    8.4      09 May 2019 09:58  Phos  4.7     05-09  Mg     2.0     05-09    TPro  5.9<L>  /  Alb  2.8<L>  /  TBili  0.6  /  DBili  x   /  AST  10  /  ALT  9<L>  /  AlkPhos  120  05-09    proBNP:   Lipid Profile:   HgA1c: Hemoglobin A1C, Whole Blood: 6.0 % (05-09 @ 10:52)    TSH: Thyroid Stimulating Hormone, Serum: 1.08 uIU/mL (05-08 @ 15:51)        < from: Transthoracic Echocardiogram (05.08.19 @ 15:16) >  onclusions:  Suboptimal apical windows.  Estimated LV ejection fraction 45%.  Eccentric mitral regurgitation directed posteriorly,  probably severe.  Moderate right ventricular enlargement. Study quality  precludes accurate assessment of right ventricular systolic  function.  Severe pulmonary hypertension. Estimated PASP = 75 mmHg.  ------------------------------------------------------------------------  Confirmed on  5/8/2019 - 18:04:38 by Juan Alberto Garcia M.D.  ------------------------------------------------------------------------    < from: Xray Chest 1 View- PORTABLE-Urgent (05.09.19 @ 06:02) >  INDINGS:     Interval placement of a right IJ transvenous pacer with the lead tip   overlying the region of the right ventricle.    Left IJ central venous catheter tip terminates in the left   brachiocephalic vein.    Endotracheal tube terminates above the olamide. Enteric tube terminates   below the diaphragm and out of view of this image.    The cardiac silhouette is unchanged.     Redemonstrated small bilateral pleural effusions. Loculated pleural fluid   is again seen on the right. The lungs are otherwise clear. No   pneumothorax.    No acute osseous abnormalities.    IMPRESSION:   Right IJ transvenous pacer tip overlies the region of the right   ventricle. No pneumothorax.    Unchanged small loculated right pleural effusion and small left pleural   effusion.            < end of copied text >        TELEMETRY: 	    ECG:  	  RADIOLOGY:   DIAGNOSTIC TESTING:  [ ] Echocardiogram:  [ ]  Catheterization:  [ ] Stress Test:    OTHER:

## 2019-05-09 NOTE — CONSULT NOTE ADULT - SUBJECTIVE AND OBJECTIVE BOX
History of Present Illness:  HPI:  ** Patient poor historian **    Patient is a 68 year old Non-Hodgkin's Lymphoma tx with chemotherapy in , DM2 with CKD stage 3, HTN, CHF EF 35% on cardiac cath 2016, pleural effusion s/p left pleuracentesis in 10/2016, cardiomyopathy, gout and HLD presents to the ED sent in from cardiologists office because of EKG changes. Patient is not sure why he is here. He was not sure where he was, why he is here or what even the year is. He remarks that he retired a few months ago and he stopped taking his medications then because he "wasn't feeling good". He states he was wife his wife earlier but then later he states shes in [place]. Patient currently denies chest pain, shortness of breath, palpitations, syncope, fevers, chills, recent travel or sick contacts. No new meds however he has stopped taking most of his meds. He overall, feels fine and is not sure why he is in the hospital.     It's very difficult to get a clear history from patient. I have tried to reach his daughter but have not received a call back as of yet. I have called [facility] patients pharmacy and he states patient picked up furosemide and irbersartan in April but has not picked up any other meds in months. Mentation in 2016 AAOX3.    In the ED, VSS. Labs at baseline, CT head with old stroke, Trops elevated and peaked at 50, now normal, EKG with TWI in lateral leads. (08 May 2019 10:33)    CT Surgery consulted for    Past Medical History  Pleural effusion  Moderate mitral regurgitation  Systolic heart failure  Rhinitis, allergic  Essential hypertension  DM type 2 (diabetes mellitus, type 2)  Diabetes Mellitus  Non-Hodgkins Lymphoma      Past Surgical History  Non-Hodgkin lymphoma: h/o axillary dissection  No significant past surgical history      MEDICATIONS  (STANDING):  allopurinol 300 milliGRAM(s) Oral daily  aspirin enteric coated 81 milliGRAM(s) Oral daily  dextrose 5%. 1000 milliLiter(s) (50 mL/Hr) IV Continuous <Continuous>  dextrose 50% Injectable 12.5 Gram(s) IV Push once  fentaNYL   Infusion. 0.5 MICROgram(s)/kG/Hr (2.427 mL/Hr) IV Continuous <Continuous>  folic acid 1 milliGRAM(s) Oral daily  furosemide   Injectable 80 milliGRAM(s) IV Push two times a day  heparin  Injectable 5000 Unit(s) SubCutaneous every 12 hours  insulin lispro (HumaLOG) corrective regimen sliding scale   SubCutaneous three times a day before meals  midazolam Infusion 0.02 mG/kG/Hr (1.942 mL/Hr) IV Continuous <Continuous>    MEDICATIONS  (PRN):  dextrose 40% Gel 15 Gram(s) Oral once PRN Blood Glucose LESS THAN 70 milliGRAM(s)/deciliter  glucagon  Injectable 1 milliGRAM(s) IntraMuscular once PRN Glucose LESS THAN 70 milligrams/deciliter  sodium chloride 0.9% lock flush 10 milliLiter(s) IV Push every 1 hour PRN Pre/post blood products, medications, blood draw, and to maintain line patency      Vital Signs Last 24 Hrs  T(C): 38.2 (19 @ 21:00), Max: 38.2 (19 @ 21:00)  T(F): 100.8 (19 @ 21:00), Max: 100.8 (19 @ 21:00)  HR: 60 (19 @ 21:00) (38 - 80)  BP: 148/68 (19 @ 21:00) (83/65 - 180/87)  RR: 18 (19 @ :00) (0 - 37)  SpO2: 98% (19 @ 21:00) (91% - 100%)           Daily     Daily Weight in k.5 (09 May 2019 00:45)  Admit Wt: Drug Dosing Weight  Height (cm): 182.88 (08 May 2019 03:59)  Weight (kg): 97.1 (08 May 2019 03:59)  BMI (kg/m2): 29 (08 May 2019 03:59)  BSA (m2): 2.19 (08 May 2019 03:59)    Allergies: No Known Allergies      SOCIAL HISTORY:  Smoker: [ ] Yes  [ ] No        PACK YEARS:                         WHEN QUIT?  ETOH use: [ ] Yes  [ ] No              FREQUENCY / QUANTITY:  Ilicit Drug use:  [ ] Yes  [ ] No      Relevant Family History  FAMILY HISTORY:  Family history of non-Hodgkin's lymphoma (Sibling)  Family history of CHF (congestive heart failure)      Review of Systems  GENERAL:  no weakness, fatigue, fevers or chills  NEURO: no dizziness, numbness, tingling or weakness  SKIN: no itching, burning, rashes, or lesions   HEENT: no visual changes;  no headache, no vertigo, no recent colds  RESPIRATORY: no shortness of breath, no cough, sputum, wheezing, hemoptysis;   CARDIOVASCULAR:  no chest pain,  or palpitations  GI: no abd pain. no N/V/D.  PERIPHERAL VASCULAR: no swelling, no tenderness, no erythema, no varicose veins.     PHYSICAL EXAM  General: Well nourished, well developed, NAD.                                              Neuro: Normal exam oriented to person/place & time with no focal motor or sensory  deficits.                    Eyes: Normal exam of conjunctiva & lids, pupils equally reactive.   ENT: Normal exam of nasal/oral mucosa with absence of cyanosis.   Neck: Normal exam of jugular veins, trachea & thyroid.   Chest: Normal lung exam with good air movement absence of wheezes, rales, or rhonchi:                                                                          CV:  Auscultation: normal S1S2, Irregular   Murmurs   Carotids: No Bruits[ ]  Abdominal Aorta: normal [ ] nonpalpable[ ]                                                                         GI: Normal exam of abdomen with no noted masses or tenderness. +BSx4Q                                                                                            Extremities: Normal no evidence of cyanosis or deformity, Edema:   Lower Extremity Pulses: Right[ ] Left[ ]Varicosities[ ]  SKIN : Normal exam to inspection & palpation.                                                           LABS:                        10.4   7.1   )-----------( 141      ( 09 May 2019 09:58 )             32.0     05-    144  |  110<H>  |  25<H>  ----------------------------<  102<H>  4.0   |  23  |  1.68<H>    Ca    8.4      09 May 2019 09:58  Phos  4.7     -  Mg     2.0     -    TPro  5.9<L>  /  Alb  2.8<L>  /  TBili  0.6  /  DBili  x   /  AST  10  /  ALT  9<L>  /  AlkPhos  120  -    PT/INR - ( 08 May 2019 23:02 )   PT: 18.4 sec;   INR: 1.58 ratio         PTT - ( 08 May 2019 23:02 )  PTT:30.0 sec      Cardiac Cath: pending 5/10    TTE / KUSHAL:< from: Transthoracic Echocardiogram (19 @ 15:16) >    Patient name: NIK GRIMM  YOB: 1950   Age: 68 (M)   MR#: [id number]  Study Date: 2019  [id number] Ade Tabor RDCS  Study quality: Technically fair  Referring Physician: Sathish Proctor MD  Blood Pressure: 142/73 mmHg  Height: 183 cm  Weight: 97 kg  BSA: 2.2 m2  ------------------------------------------------------------------------  PROCEDURE: Transthoracic echocardiogram with 2-D, M-Mode  and complete spectral and color flow Doppler.  INDICATION: Abnormal electrocardiogram (ECG) (EKG) (R94.31)  ------------------------------------------------------------------------  Dimensions:    Normal Values:  LA:     4.5    2.0 - 4.0 cm  Ao:     3.5    2.0 - 3.8 cm  SEPTUM: 0.9    0.6 - 1.2 cm  PWT:1.1    0.6 - 1.1 cm  LVIDd:  5.5    3.0 - 5.6 cm  LVIDs:         1.8 - 4.0 cm  Derived variables:  LVMI: 97 g/m2  RWT: 0.40  EF (Visual Estimate): 45 %  ------------------------------------------------------------------------  Observations:  Mitral Valve: Normal appearing mitral valve leaflets.  Mitral annular calcification.  Eccentric mitral regurgitation directed posteriorly,  probably severe.  Aortic Valve/Aorta: Mildly calcified aortic valve.  Normal aortic root size. (Ao: 3.5 cm at the sinuses of  Valsalva).  Left Atrium: Normal left atrium.  LA volume index = 28  cc/m2.  Left Ventricle: Estimated LV ejection fraction 45%.  Borderline left ventricular enlargement (EDV approximately  72 cc/m2).  Right Heart: Normal right atrium. Moderate right  ventricular enlargement. Study quality precludes accurate  assessment of right ventricular systolic function.  Normal  appearing tricuspid valve leaflets. At least moderate  tricuspid regurgitation. Normal pulmonic valve.  Pericardium/Pleura: Normal pericardium with trace  pericardial effusion.  Left pleural effusion.  Hemodynamic: Estimated right atrial pressure 15 mm Hg,  Severe pulmonary hypertension. Estimated PASP = 75 mmHg.  ------------------------------------------------------------------------  Conclusions:  Suboptimal apical windows.  Estimated LV ejection fraction 45%.  Eccentric mitral regurgitation directed posteriorly,  probably severe.  Moderate right ventricular enlargement. Study quality  precludes accurate assessment of right ventricular systolic  function.    < end of copied text > History of Present Illness:  HPI:  ** Patient poor historian **    Patient is a 68 year old Non-Hodgkin's Lymphoma tx with chemotherapy in , DM2 with CKD stage 3, HTN, CHF EF 35% on cardiac cath 2016, pleural effusion s/p left pleuracentesis in 10/2016, cardiomyopathy, gout and HLD presents to the ED sent in from cardiologists office because of EKG changes.  In the ED, VSS. Labs at baseline, CT head with old stroke, Trops elevated and peaked at 50, now normal, EKG with TWI in lateral leads. (08 May 2019 10:33)  presently admitted to CCU for complete Heart block with RBBB currently intubated on sedation and transvenously paced      Past Medical History  Pleural effusion  Moderate mitral regurgitation  Systolic heart failure  Rhinitis, allergic  Essential hypertension  DM type 2 (diabetes mellitus, type 2)  Diabetes Mellitus  Non-Hodgkins Lymphoma      Past Surgical History  Non-Hodgkin lymphoma: h/o axillary dissection  No significant past surgical history      MEDICATIONS  (STANDING):  allopurinol 300 milliGRAM(s) Oral daily  aspirin enteric coated 81 milliGRAM(s) Oral daily  dextrose 5%. 1000 milliLiter(s) (50 mL/Hr) IV Continuous <Continuous>  dextrose 50% Injectable 12.5 Gram(s) IV Push once  fentaNYL   Infusion. 0.5 MICROgram(s)/kG/Hr (2.427 mL/Hr) IV Continuous <Continuous>  folic acid 1 milliGRAM(s) Oral daily  furosemide   Injectable 80 milliGRAM(s) IV Push two times a day  heparin  Injectable 5000 Unit(s) SubCutaneous every 12 hours  insulin lispro (HumaLOG) corrective regimen sliding scale   SubCutaneous three times a day before meals  midazolam Infusion 0.02 mG/kG/Hr (1.942 mL/Hr) IV Continuous <Continuous>    MEDICATIONS  (PRN):  dextrose 40% Gel 15 Gram(s) Oral once PRN Blood Glucose LESS THAN 70 milliGRAM(s)/deciliter  glucagon  Injectable 1 milliGRAM(s) IntraMuscular once PRN Glucose LESS THAN 70 milligrams/deciliter  sodium chloride 0.9% lock flush 10 milliLiter(s) IV Push every 1 hour PRN Pre/post blood products, medications, blood draw, and to maintain line patency      Vital Signs Last 24 Hrs  T(C): 38.2 (19 @ 21:00), Max: 38.2 (19 @ 21:00)  T(F): 100.8 (19 @ 21:00), Max: 100.8 (19 @ 21:00)  HR: 60 (19 @ 21:00) (38 - 80)  BP: 148/68 (19 @ 21:00) (83/65 - 180/87)  RR: 18 (19 @ :00) (0 - 37)  SpO2: 98% (19 @ :00) (91% - 100%)           Daily     Daily Weight in k.5 (09 May 2019 00:45)  Admit Wt: Drug Dosing Weight  Height (cm): 182.88 (08 May 2019 03:59)  Weight (kg): 97.1 (08 May 2019 03:59)  BMI (kg/m2): 29 (08 May 2019 03:59)  BSA (m2): 2.19 (08 May 2019 03:59)    Allergies: No Known Allergies      SOCIAL HISTORY:  Smoker: [ ] Yes  [ ] No        PACK YEARS:                         WHEN QUIT?  ETOH use: [ ] Yes  [ ] No              FREQUENCY / QUANTITY:  Ilicit Drug use:  [ ] Yes  [ ] No      Relevant Family History  FAMILY HISTORY:  Family history of non-Hodgkin's lymphoma (Sibling)  Family history of CHF (congestive heart failure)      Review of Systems-Unable as patient is sedated    PHYSICAL EXAM  General: Well nourished, well developed, NAD.                                              Neuro: sedated                Eyes: constrictive  ENT: Normal exam of nasal/oral mucosa with absence of cyanosis.   Neck: Normal exam of jugular veins, trachea & thyroid.   Chest: Normal lung exam with good air movement absence of wheezes, rales, or rhonchi:                                                                          CV:  Auscultation: normal S1S2, Irregular  + Murmurs   Carotids: No Bruits[x ]  Abdominal Aorta: normal [ ] nonpalpable[ ]                                                                         GI: Normal exam of abdomen with no noted masses or tenderness. +BSx4Q                                                                                            Extremities: Normal no evidence of cyanosis or deformity, Edema: 2+  Lower Extremity Pulses: Right[ ] Left[ ]Varicosities[ ]  SKIN : Normal exam to inspection & palpation.                                                           LABS:                        10.4   7.1   )-----------( 141      ( 09 May 2019 09:58 )             32.0     05-09    144  |  110<H>  |  25<H>  ----------------------------<  102<H>  4.0   |  23  |  1.68<H>    Ca    8.4      09 May 2019 09:58  Phos  4.7       Mg     2.0         TPro  5.9<L>  /  Alb  2.8<L>  /  TBili  0.6  /  DBili  x   /  AST  10  /  ALT  9<L>  /  AlkPhos  120      PT/INR - ( 08 May 2019 23:02 )   PT: 18.4 sec;   INR: 1.58 ratio         PTT - ( 08 May 2019 23:02 )  PTT:30.0 sec      Cardiac Cath: pending 5/10    TTE / KUSHAL:< from: Transthoracic Echocardiogram (19 @ 15:16) >    Patient name: NIK GRIMM  YOB: 1950   Age: 68 (M)   MR#: [id number]  Study Date: 2019  Location: [address] [place], [place] [id number]: Ade Tabor RDCS  Study quality: Technically fair  Referring Physician: Sathish Proctor MD  Blood Pressure: 142/73 mmHg  Height: 183 cm  Weight: 97 kg  BSA: 2.2 m2  ------------------------------------------------------------------------  PROCEDURE: Transthoracic echocardiogram with 2-D, M-Mode  and complete spectral and color flow Doppler.  INDICATION: Abnormal electrocardiogram (ECG) (EKG) (R94.31)  ------------------------------------------------------------------------  Dimensions:    Normal Values:  LA:     4.5    2.0 - 4.0 cm  Ao:     3.5    2.0 - 3.8 cm  SEPTUM: 0.9    0.6 - 1.2 cm  PWT:1.1    0.6 - 1.1 cm  LVIDd:  5.5    3.0 - 5.6 cm  LVIDs:         1.8 - 4.0 cm  Derived variables:  LVMI: 97 g/m2  RWT: 0.40  EF (Visual Estimate): 45 %  ------------------------------------------------------------------------  Observations:  Mitral Valve: Normal appearing mitral valve leaflets.  Mitral annular calcification.  Eccentric mitral regurgitation directed posteriorly,  probably severe.  Aortic Valve/Aorta: Mildly calcified aortic valve.  Normal aortic root size. (Ao: 3.5 cm at the sinuses of  Valsalva).  Left Atrium: Normal left atrium.  LA volume index = 28  cc/m2.  Left Ventricle: Estimated LV ejection fraction 45%.  Borderline left ventricular enlargement (EDV approximately  72 cc/m2).  Right Heart: Normal right atrium. Moderate right  ventricular enlargement. Study quality precludes accurate  assessment of right ventricular systolic function.  Normal  appearing tricuspid valve leaflets. At least moderate  tricuspid regurgitation. Normal pulmonic valve.  Pericardium/Pleura: Normal pericardium with trace  pericardial effusion.  Left pleural effusion.  Hemodynamic: Estimated right atrial pressure 15 mm Hg,  Severe pulmonary hypertension. Estimated PASP = 75 mmHg.  ------------------------------------------------------------------------  Conclusions:  Suboptimal apical windows.  Estimated LV ejection fraction 45%.  Eccentric mitral regurgitation directed posteriorly,  probably severe.  Moderate right ventricular enlargement. Study quality  precludes accurate assessment of right ventricular systolic  function.    < end of copied text >

## 2019-05-09 NOTE — PROCEDURE NOTE - NSPROCDETAILS_GEN_ALL_CORE
vein accessed, pacing catheter placed
sterile technique, catheter placed/guidewire recovered/lumen(s) aspirated and flushed/sterile dressing applied/ultrasound guidance

## 2019-05-09 NOTE — PROGRESS NOTE ADULT - SUBJECTIVE AND OBJECTIVE BOX
Patient seen and examined at bedside.    Overnight Events: intubated overnight and TVP placed.       REVIEW OF SYSTEMS:  Constitutional:     [ ] negative [ ] fevers [ ] chills [ ] weight loss [ ] weight gain  HEENT:                  [ ] negative [ ] dry eyes [ ] eye irritation [ ] postnasal drip [ ] nasal congestion  CV:                         [ ] negative  [ ] chest pain [ ] orthopnea [ ] palpitations [ ] murmur  Resp:                     [ ] negative [ ] cough [ ] shortness of breath [ ] dyspnea [ ] wheezing [ ] sputum [ ]hemoptysis  GI:                          [ ] negative [ ] nausea [ ] vomiting [ ] diarrhea [ ] constipation [ ] abd pain [ ] dysphagia   :                        [ ] negative [ ] dysuria [ ] nocturia [ ] hematuria [ ] increased urinary frequency  Musculoskeletal: [ ] negative [ ] back pain [ ] myalgias [ ] arthralgias [ ] fracture  Skin:                       [ ] negative [ ] rash [ ] itch  Neurological:        [ ] negative [ ] headache [ ] dizziness [ ] syncope [ ] weakness [ ] numbness  Psychiatric:           [ ] negative [ ] anxiety [ ] depression  Endocrine:            [ ] negative [ ] diabetes [ ] thyroid problem  Heme/Lymph:      [ ] negative [ ] anemia [ ] bleeding problem  Allergic/Immune: [ ] negative [ ] itchy eyes [ ] nasal discharge [ ] hives [ ] angioedema    [ ] All other systems negative  [x ] Unable to assess ROS due to pt is intubated     Current Meds:  allopurinol 300 milliGRAM(s) Oral daily  aspirin enteric coated 81 milliGRAM(s) Oral daily  dextrose 40% Gel 15 Gram(s) Oral once PRN  dextrose 5%. 1000 milliLiter(s) IV Continuous <Continuous>  dextrose 50% Injectable 12.5 Gram(s) IV Push once  fentaNYL   Infusion. 0.5 MICROgram(s)/kG/Hr IV Continuous <Continuous>  folic acid 1 milliGRAM(s) Oral daily  furosemide   Injectable 80 milliGRAM(s) IV Push two times a day  glucagon  Injectable 1 milliGRAM(s) IntraMuscular once PRN  heparin  Injectable 5000 Unit(s) SubCutaneous every 12 hours  insulin lispro (HumaLOG) corrective regimen sliding scale   SubCutaneous three times a day before meals  midazolam Infusion 0.02 mG/kG/Hr IV Continuous <Continuous>  sodium chloride 0.9% lock flush 10 milliLiter(s) IV Push every 1 hour PRN      PAST MEDICAL & SURGICAL HISTORY:  Pleural effusion  Moderate mitral regurgitation  Systolic heart failure  Rhinitis, allergic  Essential hypertension  DM type 2 (diabetes mellitus, type 2)  Non-Hodgkins Lymphoma  Non-Hodgkin lymphoma: h/o axillary dissection      Vitals:  T(F): 96.6 (05-09), Max: 99 (05-09)  HR: 70 (05-09) (38 - 80)  BP: 103/55 (05-09) (83/65 - 180/87)  RR: 17 (05-09)  SpO2: 96% (05-09)  I&O's Summary    08 May 2019 07:01  -  09 May 2019 07:00  --------------------------------------------------------  IN: 685.8 mL / OUT: 585 mL / NET: 100.8 mL    09 May 2019 07:01  -  09 May 2019 11:00  --------------------------------------------------------  IN: 76.8 mL / OUT: 290 mL / NET: -213.2 mL        Physical Exam:  Appearance: intubated   Eyes: PERRL, EOMI, pink conjunctiva  HENT: Normal oral mucosa  Cardiovascular: RRR, S1, S2, no murmurs, rubs, or gallops; no edema; no JVD  Respiratory: Clear to auscultation bilaterally  Gastrointestinal: soft, non-tender, non-distended with normal bowel sounds  Musculoskeletal: No clubbing; no joint deformity   Neurologic: Non-focal  Lymphatic: No lymphadenopathy  Psychiatry: AAOx3, mood & affect appropriate  Skin: No rashes, ecchymoses, or cyanosis                          10.4   7.1   )-----------( 141      ( 09 May 2019 09:58 )             32.0     05-09    144  |  110<H>  |  25<H>  ----------------------------<  102<H>  4.0   |  23  |  1.68<H>    Ca    8.4      09 May 2019 09:58  Phos  4.7     05-09  Mg     2.0     05-09    TPro  5.9<L>  /  Alb  2.8<L>  /  TBili  0.6  /  DBili  x   /  AST  10  /  ALT  9<L>  /  AlkPhos  120  05-09    PT/INR - ( 08 May 2019 23:02 )   PT: 18.4 sec;   INR: 1.58 ratio         PTT - ( 08 May 2019 23:02 )  PTT:30.0 sec  CARDIAC MARKERS ( 09 May 2019 05:09 )  x     / x     / 75 U/L / x     / x      CARDIAC MARKERS ( 07 May 2019 23:35 )  x     / x     / 85 U/L / x     / 2.8 ng/mL              New ECG(s): Personally reviewed    Echo:  < from: Transthoracic Echocardiogram (05.08.19 @ 15:16) >  ------------------------------------------------------------------------  Conclusions:  Suboptimal apical windows.  Estimated LV ejection fraction 45%.  Eccentric mitral regurgitation directed posteriorly,  probably severe.  Moderate right ventricular enlargement. Study quality  precludes accurate assessment of right ventricular systolic  function.  Severe pulmonary hypertension. Estimated PASP = 75 mmHg.  ------------------------------------------------------------------------  Confirmed on  5/8/2019 - 18:04:38 by Juan Alberto Garcia M.D.  ------------------------------------------------------------------------    < end of copied text >      Cath:  < from: Cardiac Cath Lab - Adult (12.15.16 @ 15:49) >  VENTRICLES: Global left ventricular function was severely depressed. EF  estimated was 35 % and EF by echowas 45 %.  CORONARY VESSELS: The coronary circulation is right dominant.  LM:   --  LM: Angiography showed minor luminal irregularities with no flow  limiting lesions.  LAD:   --  LAD: Angiography showed minor luminal irregularities with no  flow limiting lesions.  CX:   --  Proximal circumflex: There was a 30 % stenosis.  --  Distal circumflex: There was a 70 % stenosis. The lesion was eccentric.  small distal therapy  RI:   --  Ramus intermedius: Normal.  RCA:   --  RCA: Angiography showed minor luminal irregularities with no  flow limiting lesions.  COMPLICATIONS: There were no complications.  DIAGNOSTIC IMPRESSIONS: Patient has severe global LV dysfunction, elevated  LVEDP with minor CAD as described  DIAGNOSTIC RECOMMENDATIONS: Medical therapy for LV dysfunction and CHF.  Prepared and signed by  Naren Loyd M.D.    < end of copied text >      Imaging:  < from: CT Head No Cont (05.07.19 @ 21:50) >    IMPRESSION:   No evidence of acute intracranial hemorrhage, midline shift, or   hydrocephalus.    Chronic right parietal lobe infarct.    < end of copied text >  < from: MR Head No Cont (05.08.19 @ 19:19) >  IMPRESSION: Evidence of an old cortically based infarct in the right   lateral temporal parietal region with encephalomalacia and gliosis as   well as some trace old blood products.. No evidence of acute pathology.   Age-appropriate involutional changes.    < end of copied text >      Interpretation of Telemetry: paced.

## 2019-05-09 NOTE — PROGRESS NOTE ADULT - ASSESSMENT
5/8 as the above record indicates, the pt is here and is a very poor historian. I was asked by Dr Hawkins from oncology and by the pt's internist to see the pt as he did have a past of lymphoma. The oncology office will have to look up records as his history is not readily available. When I came to see the pt he was not able to supply much info about his cancer other than he had lymphoma and did not know the type or who treated him. Dr Hawkins thinks he may have been treated by his previous associate, Dr Sarmiento.     At the time of my visit the pt was alert and oriented but was not able to give specific details about any of his medical issues. His chemistries appeared all unremarkable and he was not febrile and his ct of the head showed nothing of note.  I will see if there are any records on his previous onc history and see if there is any reason to think it may have contributed to his present admit here.     5/9 events of last day was noted and pt was intubated and had heart failure. 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 In 2010 he went to [facility] and was treated for a recurrence with BR and he has remained in remission.

## 2019-05-09 NOTE — CONSULT NOTE ADULT - ASSESSMENT
Pt is a 68 year old male PMHx of Non-Hodgkin's Lymphoma tx with chemotherapy in 2004, DM2 with CKD stage 3, HTN, CHF EF 35% on cardiac cath 12/2016, pleural effusion s/p left pleuracentesis in 10/2016, cardiomyopathy, gout and HLD presently admitted to CCU for complete Heart block with RBBB currently intubated and transvenously paced Pt is a 68 year old male PMHx of Non-Hodgkin's Lymphoma tx with chemotherapy in 2004, DM2 with CKD stage 3, HTN, CHF EF 35% on cardiac cath 12/2016, pleural effusion s/p left pleuracentesis in 10/2016, cardiomyopathy, gout and HLD presently admitted to CCU for complete Heart block with RBBB currently intubated and transvenously paced.

## 2019-05-09 NOTE — PROGRESS NOTE ADULT - SUBJECTIVE AND OBJECTIVE BOX
====================  CCU MIDNIGHT ROUNDS  ====================    NIK GRIMM  [id number]    ====================  SUMMARY:  ====================        ====================  NEW EVENTS:  ====================        ====================  VITALS (Last 12 hrs):  ====================    T(C): 38.3 (05-09-19 @ 21:30), Max: 38.3 (05-09-19 @ 21:30)  HR: 60 (05-09-19 @ 21:30) (56 - 70)  BP: 151/72 (05-09-19 @ 21:30) (98/59 - 151/72)  BP(mean): 106 (05-09-19 @ 21:30) (66 - 106)  ABP: --  ABP(mean): --  RR: 18 (05-09-19 @ 21:30) (12 - 19)  SpO2: 97% (05-09-19 @ 21:30) (96% - 100%)  Wt(kg): --  CVP(mm Hg): 12 (05-09-19 @ 21:30) (5 - 12)  CO: --  CI: --  PA: --  PA(mean): --  PA(direct): --  PCWP: --  SVR: --    TELEMETRY:    Mode: AC/ CMV (Assist Control/ Continuous Mandatory Ventilation)  RR (machine): 12  TV (machine): 500  FiO2: 35  PEEP: 5  ITime: 1  MAP: 10  PIP: 20      *BLOOD GAS/ARTERIAL/MIXED/VENOUS  *LACTATE    I&O's Summary    08 May 2019 07:01  -  09 May 2019 07:00  --------------------------------------------------------  IN: 685.8 mL / OUT: 585 mL / NET: 100.8 mL    09 May 2019 07:01  -  09 May 2019 22:12  --------------------------------------------------------  IN: 388.8 mL / OUT: 2945 mL / NET: -2556.2 mL        ====================  PLAN:  ==================== ====================  CCU MIDNIGHT ROUNDS  ====================  NIK GRIMM  [id number]  ====================  SUMMARY: 68 year old male w/ hx of non hogkin lymphoma s/p chemo, CKD stage III, HTN, CHFrEF 35 % admitted to the hospital for ekg changes found to be in complete heart block. Hospital course c/b severe bradycardia requiring TVP and intubation for airway protection.   ====================  ====================  NEW EVENTS:  Patient found to have severe MR yet requiring medication optimization.   Will need cardiac cath and KUSHAL( To better assess LV function mitral disease) , Neuro work up to be completed including MR head with and without contrast. Films currently under review by Ct surgery.     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 In 2010 he went to [facility] and was treated for a recurrence with BR and he has remained in remission.    EP following with plan cor CRT device tomorrow     Pt will need cardiac MRI, however, with TVP it will not be possible at this time.   - TVP checks with current threshold at .8 and rate at 70  - Underlying rhythm is CHB  - Keep pt NPO at MN  - Hold heparin subq   - RPR, TSH, b12, and folate WNL  -f/u with blood cultures, lyme titres pending.     US Abdomen Complete  Hepatosplenomegaly.Small volume ascites.Moderate left pleural effusion.  ====================  ====================  VITALS (Last 12 hrs):  ====================    T(C): 38.3 (05-09-19 @ 21:30), Max: 38.3 (05-09-19 @ 21:30)  HR: 60 (05-09-19 @ 21:30) (56 - 70)  BP: 151/72 (05-09-19 @ 21:30) (98/59 - 151/72)  BP(mean): 106 (05-09-19 @ 21:30) (66 - 106)  ABP: --  ABP(mean): --  RR: 18 (05-09-19 @ 21:30) (12 - 19)  SpO2: 97% (05-09-19 @ 21:30) (96% - 100%)  Wt(kg): --  CVP(mm Hg): 12 (05-09-19 @ 21:30) (5 - 12)  CO: --  CI: --  PA: --  PA(mean): --  PA(direct): --  PCWP: --  SVR: --    TELEMETRY:    Mode: AC/ CMV (Assist Control/ Continuous Mandatory Ventilation)  RR (machine): 12  TV (machine): 500  FiO2: 35  PEEP: 5  ITime: 1  MAP: 10  PIP: 20    *BLOOD GAS/ARTERIAL/MIXED/VENOUS  *LACTATE    I&O's Summary    08 May 2019 07:01  -  09 May 2019 07:00  --------------------------------------------------------  IN: 685.8 mL / OUT: 585 mL / NET: 100.8 mL    09 May 2019 07:01  -  09 May 2019 22:12  --------------------------------------------------------  IN: 388.8 mL / OUT: 2945 mL / NET: -2556.2 mL  ====================  PLAN:  Pt is a 68 year old male PMHx of Non-Hodgkin's Lymphoma tx with chemotherapy in 2004, DM2 with CKD stage 3, HTN, CHF EF 35% on cardiac cath 12/2016, pleural effusion s/p left pleuracentesis in 10/2016, cardiomyopathy, gout and HLD presently admitted to CCU for complete Heart block with RBBB currently intubated and transvenously paced    Neuro  Encephalopathy of unclear etiology, AOx3 per family normally.   -TSH, folate, B12 wnl. Urine drug screen negative, Blood alcohol level negative, chronic right parietal lobe infarct, rpr negative  - Intubated and sedated   - midazolam & fentanyl gtt to titrate to a RASS of 0 to -1  -f/u MRI/ neuro recs    Skin  - L IJ 5/8, R TVP 5/8    GI  -start tube feeds  Concern for cirrhosis given signs of RHFailure and no transaminitis w/ coagulopathy, anemia, thrombocytopenia.   - RUQ US    Endo  - Type 2 DM  - a1c  - Insulin Sliding Scale , hypoglycemia protocol in place   - Fingerticks q6     Heme  - Patient anemic on admission with hemoglobin of 10.5 from 12.3 on admission  - iron studies with evidence of GEORGETTE     Renal/electrolytes  -STEPHEN on CKD stage III  -STEPHEN likely pre-renal 2/2 to decreased cardiac output and perfusion   -Patient has mittal in place  - monitor I &O's, renally dose all meds, avoid nephrotoxic agents     ID  - Absence of leukocytosis, absence of lactate   - Blood cultures pending     Resp  Who group 2 pulm HTN likely secondary to MR   -plan asper cardio  -  post intubation ABG WNL      Cardio  Complete Heart block   -hold all AV karen blocking agents  - plan for  cardiac MRI to r/o other cause of Non ischemic CMP once extubated/TVP out   -f/u EP; Per EP pt will go for CRT-P vs CRT-D implant after neuro work-up/echo/MRI  - Hold antihypertensives for now, pt compensating well  - TVP placed overnight with capture   - serial EKGs, monitor on tele    #Chronic systolic heart failure  - TTE EF 45%, likely severe MR, moderately enlarged RV , severe pulmonary hypertension. Estimated PASP = 75 mmHg.  - Medication non compliance  - Holding antihypertensives given CHB  - Start furosemide 80mg IV BID w/ I+Os given signs of volume overload.     DVT  - Heparin Sub Q    ==================== ====================  CCU MIDNIGHT ROUNDS  ====================  NIK GRIMM  [id number]  ====================  SUMMARY: 68 year old male w/ hx of non hogkin lymphoma s/p chemo, CKD stage III, HTN, CHFrEF 35 % admitted to the hospital for ekg changes found to be in complete heart block. Hospital course c/b severe bradycardia requiring TVP and intubation for airway protection.   ====================  ====================  NEW EVENTS:  Patient found to have severe MR yet requiring medication optimization. Will need cardiac cath and KUSHAL( To better assess LV function mitral disease) , Neuro work up to be completed including MR head with and without contrast. Films currently under review by Ct surgery.     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 In 2010 he went to [facility] and was treated for a recurrence with BR and he has remained in remission.    EP following with plan cor CRT device tomorrow     Pt will need cardiac MRI, however, with TVP it will not be possible at this time.   - TVP checks with current threshold at .8 and rate at 70  - Underlying rhythm is CHB  - Keep pt NPO at MN  - Hold heparin subq   - RPR, TSH, b12, and folate WNL  -f/u with blood cultures, lyme titres pending.     US Abdomen Complete  Hepatosplenomegaly. Small volume ascites. Moderate left pleural effusion.    Patient became febrile overnight to 101, 38.4, he received Lasix IV and the CVP dropped from 9 to 2 . He became hypotensive to SBP 90's. Was cultured and started on zosyn/ vancomycin . Provided back 500 cc of fluid. Will hold off on pressors- reconsider if patient remains hypotensive.   ====================  ====================  VITALS (Last 12 hrs):  ====================    T(C): 38.3 (05-09-19 @ 21:30), Max: 38.3 (05-09-19 @ 21:30)  HR: 60 (05-09-19 @ 21:30) (56 - 70)  BP: 151/72 (05-09-19 @ 21:30) (98/59 - 151/72)  BP(mean): 106 (05-09-19 @ 21:30) (66 - 106)  ABP: --  ABP(mean): --  RR: 18 (05-09-19 @ 21:30) (12 - 19)  SpO2: 97% (05-09-19 @ 21:30) (96% - 100%)  Wt(kg): --  CVP(mm Hg): 12 (05-09-19 @ 21:30) (5 - 12)  CO: --  CI: --  PA: --  PA(mean): --  PA(direct): --  PCWP: --  SVR: --    TELEMETRY:    Mode: AC/ CMV (Assist Control/ Continuous Mandatory Ventilation)  RR (machine): 12  TV (machine): 500  FiO2: 35  PEEP: 5  ITime: 1  MAP: 10  PIP: 20    *BLOOD GAS/ARTERIAL/MIXED/VENOUS  *LACTATE    I&O's Summary    08 May 2019 07:01  -  09 May 2019 07:00  --------------------------------------------------------  IN: 685.8 mL / OUT: 585 mL / NET: 100.8 mL    09 May 2019 07:01  -  09 May 2019 22:12  --------------------------------------------------------  IN: 388.8 mL / OUT: 2945 mL / NET: -2556.2 mL  ====================  PLAN:  Pt is a 68 year old male PMHx of Non-Hodgkin's Lymphoma tx with chemotherapy in 2004, DM2 with CKD stage 3, HTN, CHF EF 35% on cardiac cath 12/2016, pleural effusion s/p left pleuracentesis in 10/2016, cardiomyopathy, gout and HLD presently admitted to CCU for complete Heart block with RBBB currently intubated and transvenously paced    Neuro  Encephalopathy of unclear etiology, AOx3 per family normally.   -TSH, folate, B12 wnl. Urine drug screen negative, Blood alcohol level negative, chronic right parietal lobe infarct, rpr negative  - Intubated and sedated   - midazolam & fentanyl gtt to titrate to a RASS of 0 to -1  -f/u MRI/ neuro recs    Skin  - L IJ 5/8, R TVP 5/8    GI  -start tube feeds  Concern for cirrhosis given signs of RHFailure and no transaminitis w/ coagulopathy, anemia, thrombocytopenia.   - RUQ US    Endo  - Type 2 DM  - a1c  - Insulin Sliding Scale , hypoglycemia protocol in place   - Fingerticks q6     Heme  - Patient anemic on admission with hemoglobin of 10.5 from 12.3 on admission  - iron studies with evidence of GEORGETTE     Renal/electrolytes  -STEPHEN on CKD stage III  -STEPHEN likely pre-renal 2/2 to decreased cardiac output and perfusion   -Patient has mittal in place  - monitor I &O's, renally dose all meds, avoid nephrotoxic agents     ID  - Absence of leukocytosis, absence of lactate   - Blood cultures pending     Resp  Who group 2 pulm HTN likely secondary to MR   -plan asper cardio  -  post intubation ABG WNL      Cardio  Complete Heart block   -hold all AV karen blocking agents  - plan for  cardiac MRI to r/o other cause of Non ischemic CMP once extubated/TVP out   -f/u EP; Per EP pt will go for CRT-P vs CRT-D implant after neuro work-up/echo/MRI  - Hold antihypertensives for now, pt compensating well  - TVP placed overnight with capture   - serial EKGs, monitor on tele    #Chronic systolic heart failure  - TTE EF 45%, likely severe MR, moderately enlarged RV , severe pulmonary hypertension. Estimated PASP = 75 mmHg.  - Medication non compliance  - Holding antihypertensives given CHB  - Start furosemide 80mg IV BID w/ I+Os given signs of volume overload.     DVT  - Heparin Sub Q    ====================

## 2019-05-09 NOTE — PROCEDURE NOTE - NSPOSTPRCRAD_GEN_A_CORE
pacing catheter located in the right ventricle/chest radiograph
no pneumothorax/post-procedure radiography performed/depth of insertion/line adjusted to depth of insertion/central line located in the superior vena cava/central line located in the

## 2019-05-09 NOTE — PROGRESS NOTE ADULT - ATTENDING COMMENTS
Patient is seen and examined with fellow, NP and the CCU house-staff. I agree with the history, physical and the assessment and plan.  intubated/sedated  CPAP trial  will diurese to keep negative fluid balance  s/p TVP cannot perform an MRI in setting of the MRI  f/u with blood cultures  Lyme titres pending

## 2019-05-09 NOTE — PROGRESS NOTE ADULT - SUBJECTIVE AND OBJECTIVE BOX
Called to patient's bedside for intubation.  Therapy initiated by primary medical team.     Patient Assessment: Altered. Respiratory distress. Non-rebreather mask on. Complete heart block. Edentulous. Upper and lower full dentures.     Baseline Vital Signs:  BP: 148/96  HR: CHB  RR: 22  SpO2: 99    Medications Administered: Etomidate [20mg], Succinylcholine 140mg    Intubation:      [x] Direct Laryngoscopy, attempt X2, anterior airway    [ ] Video-Assisted Laryngoscopy   [ ] Fiberoptic Intubation    Blade: MAC 3   Cormack-Lehane View: [ ] 1     [x] 2A     [ ] 2B     [ ] 3     [ ]  4  ETT Size: 8.0 cuffed  Marking at Teeth: 25    Post-Intubation Vital Signs:  BP: 180/86  HR: CHB  RR: 14T  SpO2: 100%    Upper and lower dentures removed - given to CCU RN. Positive end-tidal carbon dioxide via EasyCap, positive bilateral breath sounds.  CXR to be reviewed by primary team.  Atraumatic intubation with no complications.

## 2019-05-09 NOTE — PROGRESS NOTE ADULT - ASSESSMENT
Pt is a 68 year old male PMHx of Non-Hodgkin's Lymphoma tx with chemotherapy in 2004, DM2 with CKD stage 3, HTN, CHF EF 35% on cardiac cath 12/2016, pleural effusion s/p left pleuracentesis in 10/2016, cardiomyopathy, gout and HLD presently admitted to CCU for complete Heart block with RBBB currently intubated and transvenously paced    #Neuro  Encephalopathy of unclear etiology, AOx3 per family normally.   -TSH, folate, B12 wnl. Urine drug screen negative, Blood alcohol level negative, chronic right parietal lobe infarct, rpr negative  - Intubated and sedated   - Versed & fentanyl gtt to titrate to a RASS of 0 to -1    - Patient appeared encephalopathic on admission and throughout hospital stay. Per daughter he is AOX3 typically .   - Differential for Encephalopathy includes infectious vs PE vs hypoxemia. Unclear etiology as patient does not have a white count nor does he have a lactate, severe pulmonary HTN  and on echo and   - Neuro Following, CT head demonstrated old parietal infarct   - MRI brain w/wo gadolinium,  once STEPHEN resolves   - Routine EEG ordered per Neuro Recs     #Resp  - Severe pulmonary HTN on echo  - Several intermittent apneic events prior to intubation   -  post intubation ABG WNL      #CV  Complete Heart block   -hold all AV karen blocking agents  - will go for  cardiac MRI to r/o other cause of Non ischemic CMP once extubated   - Per EP pt will go for CRT-P vs CRT-D implant after neuro work-up/echo/MRI  - Hold antihypertensives for now, pt compensating well  - Observe with R2 pads  - TVP placed overnight with capture   - serial EKGs, monitor on tele    #Chronic systolic heart failure  - TTE EF 45%, likely severe MR, moderately enlarged RV , severe pulmonary hypertension. Estimated PASP = 75 mmHg.  - Medication non compliance  - Holding antihypertensives given CHB  - Cont spironolactone and Lasix 20 po qd with additional IVP PRN      #GI  - NPO for now  - No Known Active issues     #ID  - Absence of leukocytosis, absence of lactate   - Blood cultures pending     #Renal  -STEPHEN on CKD stage III  -STEPHEN likely pre-renal 2/2 to decreased cardiac output and perfusion   -Patient has mittal in place  - monitor I &O's, renally dose all meds, avoid nephrotoxic agents     #Heme  - Patient anemic on admission with hemoglobin of 10.5 from 12.3 on admission  - iron studies with evidence of GEORGETTE     #Endo  - Type 2 DM  - Insulin Sliding Scale , hypoglycemia protocol in place   - Fingerticks q6     #DVT PPx  - Heparin Sub Q    Neuro    Skin    GI    Endo    Heme    Renal/electrolytes    ID    Resp    Cardio    DVT Pt is a 68 year old male PMHx of Non-Hodgkin's Lymphoma tx with chemotherapy in 2004, DM2 with CKD stage 3, HTN, CHF EF 35% on cardiac cath 12/2016, pleural effusion s/p left pleuracentesis in 10/2016, cardiomyopathy, gout and HLD presently admitted to CCU for complete Heart block with RBBB currently intubated and transvenously paced    Neuro  Encephalopathy of unclear etiology, AOx3 per family normally.   -TSH, folate, B12 wnl. Urine drug screen negative, Blood alcohol level negative, chronic right parietal lobe infarct, rpr negative  - Intubated and sedated   - midazolam & fentanyl gtt to titrate to a RASS of 0 to -1  -f/u MRI/ neuro recs    Skin  - L IJ 5/8, R TVP 5/8    GI  -start tube feeds  Concern for cirrhosis given signs of RHFailure and no transaminitis w/ coagulopathy, anemia, thrombocytopenia.   - RUQ US    Endo  - Type 2 DM  - a1c  - Insulin Sliding Scale , hypoglycemia protocol in place   - Fingerticks q6     Heme  - Patient anemic on admission with hemoglobin of 10.5 from 12.3 on admission  - iron studies with evidence of GEORGETTE     Renal/electrolytes  -STEPHEN on CKD stage III  -STEPHEN likely pre-renal 2/2 to decreased cardiac output and perfusion   -Patient has mittal in place  - monitor I &O's, renally dose all meds, avoid nephrotoxic agents     ID  - Absence of leukocytosis, absence of lactate   - Blood cultures pending     Resp  Who group 2 pulm HTN likely secondary to MR   -plan asper cardio  -  post intubation ABG WNL      Cardio  Complete Heart block   -hold all AV karen blocking agents  - plan for  cardiac MRI to r/o other cause of Non ischemic CMP once extubated/TVP out   -f/u EP; Per EP pt will go for CRT-P vs CRT-D implant after neuro work-up/echo/MRI  - Hold antihypertensives for now, pt compensating well  - TVP placed overnight with capture   - serial EKGs, monitor on tele    #Chronic systolic heart failure  - TTE EF 45%, likely severe MR, moderately enlarged RV , severe pulmonary hypertension. Estimated PASP = 75 mmHg.  - Medication non compliance  - Holding antihypertensives given CHB  - Start furosemide 80mg IV BID w/ I+Os given signs of volume overload.     DVT  - Heparin Sub Q

## 2019-05-09 NOTE — CHART NOTE - NSCHARTNOTEFT_GEN_A_CORE
Spoke to daughter Vonnie ( who is located in [place]). Patient has been desaturating overnight, intermittently apneic, requiring high amounts of supplemental o2. Had received a total of 5 haldol X2 and 5 of ativan thus far. Daughter was notified that anesthesia is being called to mechanically ventilate to help her dad as he is not currently maintaining his airway. She relayed understanding and agreed to intubation and CPR if necessary. Would like to be updated with any new status throughout the night.       Ainsley Clay MD  [facility]   Pager ID #: [phone]   PGY1- Categorical  Medicine Intern

## 2019-05-09 NOTE — PROGRESS NOTE ADULT - ASSESSMENT
68 year old Non-Hodgkin's Lymphoma tx with chemotherapy in 2004, DM2 with CKD stage 3, HTN, CHF EF 35% on cardiac cath 12/ 2016, pleural effusion s/p left pleuracentesis in 10/2016, cardiomyopathy, gout and HLD presents to the ED sent in from cardiologists office because of AMS    #  sp intubated, sedated   CHB sp TVP  CCU mgmt  cards and EP fu    #Acute encephalopathy. ?etiology r/o early dementia  -chronic parietal infarct in CT head  pending MR brain , neuro cs fu  vitb12, folate, iron panel, rpr and tsh levels wnl  -  # Chronic systolic heart failure.    - TTE EF 45%, likely severe MR, moderately enlarged RV , severe pulmonary hypertension. Estimated PASP = 75 mmHg.  - Medication non compliance  - Holding antihypertensives given CHB  - Start furosemide 80mg IV BID w/ I+Os given signs of volume overload.     # Non-Hodgkin lymphoma of lymph nodes of neck, unspecified non-Hodgkin lymphoma type.  Plan: - treated with chemotherapy in 2014.   Anemia-monitor h/h    # Essential hypertension.  Plan: - bp stable  - c/w arb and beta blocker.     # Type 2 diabetes mellitus with chronic kidney disease, without long-term current use of insulin, unspecified CKD stage. Plan: - fs achs  - fs controlled  - start sliding scale insulin  - hba1c ordered for the am.    # Chronic gout without tophus, unspecified cause, unspecified site.   - c/w allopurinol 300 mg daily.

## 2019-05-09 NOTE — PROCEDURE NOTE - NSPOSTCAREGUIDE_GEN_A_CORE
Instructed patient/caregiver regarding signs and symptoms of infection/Verbal/written post procedure instructions were given to patient/caregiver
Keep the cast/splint/dressing clean and dry/Care for catheter as per unit/ICU protocols/Verbal/written post procedure instructions were given to patient/caregiver/Instructed patient/caregiver regarding signs and symptoms of infection

## 2019-05-09 NOTE — PROGRESS NOTE ADULT - SUBJECTIVE AND OBJECTIVE BOX
Patient is a 68y old  Male who presents with a chief complaint of confusion (09 May 2019 13:27)      INTERVAL HPI/OVERNIGHT EVENTS: events from last night noted ,   pt found to be in CHB.    became apneic/hypoxic , was intubated and sedated, temporary pacemaker placed  Presently he is in the CCU with temporary pacemaker intubated and sedated      Vital Signs Last 24 Hrs  T(C): 38 (09 May 2019 19:30), Max: 38 (09 May 2019 19:30)  T(F): 100.4 (09 May 2019 19:30), Max: 100.4 (09 May 2019 19:30)  HR: 60 (09 May 2019 20:00) (38 - 80)  BP: 141/66 (09 May 2019 20:00) (83/65 - 180/87)  BP(mean): 96 (09 May 2019 20:00) (63 - 126)  RR: 18 (09 May 2019 20:00) (0 - 37)  SpO2: 99% (09 May 2019 20:00) (91% - 100%)    allopurinol 300 milliGRAM(s) Oral daily  aspirin enteric coated 81 milliGRAM(s) Oral daily  dextrose 40% Gel 15 Gram(s) Oral once PRN  dextrose 5%. 1000 milliLiter(s) IV Continuous <Continuous>  dextrose 50% Injectable 12.5 Gram(s) IV Push once  fentaNYL   Infusion. 0.5 MICROgram(s)/kG/Hr IV Continuous <Continuous>  folic acid 1 milliGRAM(s) Oral daily  furosemide   Injectable 80 milliGRAM(s) IV Push two times a day  glucagon  Injectable 1 milliGRAM(s) IntraMuscular once PRN  heparin  Injectable 5000 Unit(s) SubCutaneous every 12 hours  insulin lispro (HumaLOG) corrective regimen sliding scale   SubCutaneous three times a day before meals  midazolam Infusion 0.02 mG/kG/Hr IV Continuous <Continuous>  sodium chloride 0.9% lock flush 10 milliLiter(s) IV Push every 1 hour PRN      PHYSICAL EXAM:  GENERAL: intubated, sedated in CCU,   EYES: conjunctiva and sclera clear  ENMT: Moist mucous membranes  NECK: Supple, No JVD, Normal thyroid  NERVOUS SYSTEM:  Alert & Oriented X0,   CHEST/LUNG: Clear to auscultation bilaterally; ant  HEART: Regular rate and rhythm; No murmurs, rubs, or gallops  ABDOMEN: Soft, Nontender, Nondistended; Bowel sounds present  EXTREMITIES:  2+edema      Consultant(s) Notes Reviewed:  [x ] YES  [ ] NO  Care Discussed with Consultants/Other Providers [ x] YES  [ ] NO    LABS:                        10.4   7.1   )-----------( 141      ( 09 May 2019 09:58 )             32.0     05-09    144  |  110<H>  |  25<H>  ----------------------------<  102<H>  4.0   |  23  |  1.68<H>    Ca    8.4      09 May 2019 09:58  Phos  4.7     05-  Mg     2.0     -    TPro  5.9<L>  /  Alb  2.8<L>  /  TBili  0.6  /  DBili  x   /  AST  10  /  ALT  9<L>  /  AlkPhos  120  05-09    PT/INR - ( 08 May 2019 23:02 )   PT: 18.4 sec;   INR: 1.58 ratio         PTT - ( 08 May 2019 23:02 )  PTT:30.0 sec  Urinalysis Basic - ( 08 May 2019 00:17 )    Color: Yellow / Appearance: Slightly Turbid / S.023 / pH: x  Gluc: x / Ketone: Negative  / Bili: Small / Urobili: 3 mg/dL   Blood: x / Protein: 300 mg/dL / Nitrite: Negative   Leuk Esterase: Negative / RBC: 2 /hpf / WBC 7 /HPF   Sq Epi: x / Non Sq Epi: 1 /hpf / Bacteria: Negative      CAPILLARY BLOOD GLUCOSE      POCT Blood Glucose.: 80 mg/dL (09 May 2019 16:06)  POCT Blood Glucose.: 69 mg/dL (09 May 2019 16:05)  POCT Blood Glucose.: 84 mg/dL (09 May 2019 11:27)  POCT Blood Glucose.: 112 mg/dL (09 May 2019 08:24)  POCT Blood Glucose.: 146 mg/dL (08 May 2019 21:36)      ABG - ( 09 May 2019 04:50 )  pH, Arterial: 7.39  pH, Blood: x     /  pCO2: 41    /  pO2: 99    / HCO3: 24    / Base Excess: -.3   /  SaO2: 98                Urinalysis Basic - ( 08 May 2019 00:17 )    Color: Yellow / Appearance: Slightly Turbid / S.023 / pH: x  Gluc: x / Ketone: Negative  / Bili: Small / Urobili: 3 mg/dL   Blood: x / Protein: 300 mg/dL / Nitrite: Negative   Leuk Esterase: Negative / RBC: 2 /hpf / WBC 7 /HPF   Sq Epi: x / Non Sq Epi: 1 /hpf / Bacteria: Negative        RADIOLOGY & ADDITIONAL TESTS:    Imaging Personally Reviewed:  [ ] YES  [ ] NO

## 2019-05-09 NOTE — PROGRESS NOTE ADULT - ASSESSMENT
Patient is a 68 year old Non-Hodgkin's Lymphoma tx with chemotherapy in 2004, DM2 with CKD stage 3, HTN, CHF EF 35% on cardiac cath 12/ 2016, pleural effusion s/p left thoracentesis in 10/2016, Non ischemic cardiomyopathy, gout and HLD presents to the ED sent in from cardiologists office because of EKG changes. Patient is not sure why he is here. He was not sure where he was, why he is here or what even the year is. He remarks that he retired a few months ago and he stopped taking his medications then because he "wasn't feeling good". He states he was wife his wife earlier but then later he states shes in [place]. Patient currently denies chest pain, shortness of breath, palpitations, syncope, fevers, chills, recent travel or sick contacts. No new meds however he has stopped taking most of his meds. He overall, feels fine and is not sure why he is in the hospital.     Overnight pt intubated for AMS and progression to CHB.     - Pt will need cardiac MRI, however, with TVP it will not be possible at this time.   - TVP checks with current threshold at .8 and rate at 70  - Underlying rhythm is CHB  - Keep pt NPO at MN  - Hold heparin subq   - RPR, TSH, b12, and folate WNL  - Would ask neuro if pt would warrant a LP for encephalopathy   - Will likely plan for CRT device on 5/10    Scott Miles MD  Cardiology Fellow - PGY 4  Text or Call: [phone]  For all Cardiology Consults and Questions:  [email or web address]   Login: [email or web address]

## 2019-05-09 NOTE — PROGRESS NOTE ADULT - SUBJECTIVE AND OBJECTIVE BOX
PATIENT:  NIK GRIMM  [id number]    CHIEF COMPLAINT:  Patient is a 68y old  Male who presents with a chief complaint of confusion (09 May 2019 02:54)      INTERVAL HISTORY/OVERNIGHT EVENTS:  Patient became agitated overnight, requiring sedation w/ haloperidol and lorazepam. Became hypoxemic and required intubation.   Went into complete heart block, became hypotensive and required TVP. HR and BP improved after pacing.       REVIEW OF SYSTEMS:    Constitutional:     [ ] negative [ ] fevers [ ] chills [ ] weight loss [ ] weight gain  HEENT:                  [ ] negative [ ] dry eyes [ ] eye irritation [ ] postnasal drip [ ] nasal congestion  CV:                         [ ] negative  [ ] chest pain [ ] orthopnea [ ] palpitations [ ] murmur  Resp:                     [ ] negative [ ] cough [ ] shortness of breath [ ] dyspnea [ ] wheezing [ ] sputum [ ] hemoptysis  GI:                          [ ] negative [ ] nausea [ ] vomiting [ ] diarrhea [ ] constipation [ ] abd pain [ ] dysphagia   :                        [ ] negative [ ] dysuria [ ] nocturia [ ] hematuria [ ] increased urinary frequency  Musculoskeletal: [ ] negative [ ] back pain [ ] myalgias [ ] arthralgias [ ] fracture  Skin:                       [ ] negative [ ] rash [ ] itch  Neurological:        [ ] negative [ ] headache [ ] dizziness [ ] syncope [ ] weakness [ ] numbness  Psychiatric:           [ ] negative [ ] anxiety [ ] depression  Endocrine:            [ ] negative [ ] diabetes [ ] thyroid problem  Heme/Lymph:      [ ] negative [ ] anemia [ ] bleeding problem  Allergic/Immune: [ ] negative [ ] itchy eyes [ ] nasal discharge [ ] hives [ ] angioedema    [ ] All other systems negative  [X ] Unable to assess ROS because __sedated______.    MEDICATIONS:  MEDICATIONS  (STANDING):  allopurinol 300 milliGRAM(s) Oral daily  aspirin enteric coated 81 milliGRAM(s) Oral daily  dextrose 5%. 1000 milliLiter(s) (50 mL/Hr) IV Continuous <Continuous>  dextrose 50% Injectable 12.5 Gram(s) IV Push once  fentaNYL   Infusion. 0.5 MICROgram(s)/kG/Hr (2.427 mL/Hr) IV Continuous <Continuous>  folic acid 1 milliGRAM(s) Oral daily  furosemide   Injectable 80 milliGRAM(s) IV Push two times a day  heparin  Injectable 5000 Unit(s) SubCutaneous every 12 hours  insulin lispro (HumaLOG) corrective regimen sliding scale   SubCutaneous three times a day before meals  midazolam Infusion 0.02 mG/kG/Hr (1.942 mL/Hr) IV Continuous <Continuous>    MEDICATIONS  (PRN):  dextrose 40% Gel 15 Gram(s) Oral once PRN Blood Glucose LESS THAN 70 milliGRAM(s)/deciliter  glucagon  Injectable 1 milliGRAM(s) IntraMuscular once PRN Glucose LESS THAN 70 milligrams/deciliter  sodium chloride 0.9% lock flush 10 milliLiter(s) IV Push every 1 hour PRN Pre/post blood products, medications, blood draw, and to maintain line patency      ALLERGIES:  Allergies    No Known Allergies    Intolerances        OBJECTIVE:  ICU Vital Signs Last 24 Hrs  T(C): 35.9 (09 May 2019 07:00), Max: 37.2 (09 May 2019 04:00)  T(F): 96.6 (09 May 2019 07:00), Max: 99 (09 May 2019 04:00)  HR: 80 (09 May 2019 07:45) (38 - 80)  BP: 118/72 (09 May 2019 07:45) (83/65 - 180/87)  BP(mean): 85 (09 May 2019 07:45) (63 - 126)  ABP: --  ABP(mean): --  RR: 16 (09 May 2019 07:45) (0 - 37)  SpO2: 99% (09 May 2019 07:45) (91% - 100%)    Mode: AC/ CMV (Assist Control/ Continuous Mandatory Ventilation)  RR (machine): 12  TV (machine): 500  FiO2: 35  PEEP: 5  ITime: 1  MAP: 9  PIP: 21    Adult Advanced Hemodynamics Last 24 Hrs  CVP(mm Hg): 9 (09 May 2019 07:45) (9 - 18)  CVP(cm H2O): --  CO: --  CI: --  PA: --  PA(mean): --  PCWP: --  SVR: --  SVRI: --  PVR: --  PVRI: --  CAPILLARY BLOOD GLUCOSE      POCT Blood Glucose.: 112 mg/dL (09 May 2019 08:24)  POCT Blood Glucose.: 146 mg/dL (08 May 2019 21:36)  POCT Blood Glucose.: 127 mg/dL (08 May 2019 17:39)  POCT Blood Glucose.: 161 mg/dL (08 May 2019 12:55)  POCT Blood Glucose.: 91 mg/dL (08 May 2019 08:57)    CAPILLARY BLOOD GLUCOSE      POCT Blood Glucose.: 112 mg/dL (09 May 2019 08:24)    I&O's Summary    08 May 2019 07:  -  09 May 2019 07:00  --------------------------------------------------------  IN: 685.8 mL / OUT: 585 mL / NET: 100.8 mL    09 May 2019 07:01  -  09 May 2019 08:37  --------------------------------------------------------  IN: 19.2 mL / OUT: 275 mL / NET: -255.8 mL      Daily     Daily Weight in k.5 (09 May 2019 00:45)    PHYSICAL EXAMINATION:  General: WN/WD NAD  HEENT: PERRLA, EOMI, moist mucous membranes  Neurology: A&Ox3, nonfocal, VAUGHAN x 4  Respiratory: CTA B/L, normal respiratory effort, no wheezes, crackles, rales  CV: RRR, S1S2, no murmurs, rubs or gallops  Abdominal: Soft, NT, ND +BS, Last BM  Extremities: No edema, + peripheral pulses  Incisions:   Tubes:    LABS:  ABG - ( 09 May 2019 04:50 )  pH, Arterial: 7.39  pH, Blood: x     /  pCO2: 41    /  pO2: 99    / HCO3: 24    / Base Excess: -.3   /  SaO2: 98                                      10.5   6.5   )-----------( 145      ( 09 May 2019 05:09 )             32.0     05-09    140  |  108  |  26<H>  ----------------------------<  150<H>  4.4   |  22  |  1.82<H>    Ca    8.4      09 May 2019 05:09  Phos  5.0     05-09  Mg     2.1     05-09    TPro  6.0  /  Alb  2.9<L>  /  TBili  0.6  /  DBili  x   /  AST  16  /  ALT  10  /  AlkPhos  126<H>      LIVER FUNCTIONS - ( 09 May 2019 05:09 )  Alb: 2.9 g/dL / Pro: 6.0 g/dL / ALK PHOS: 126 U/L / ALT: 10 U/L / AST: 16 U/L / GGT: x           PT/INR - ( 08 May 2019 23:02 )   PT: 18.4 sec;   INR: 1.58 ratio         PTT - ( 08 May 2019 23:02 )  PTT:30.0 sec  Creatine Kinase, Serum: 75 U/L ( @ 05:09)    CARDIAC MARKERS ( 09 May 2019 05:09 )  x     / x     / 75 U/L / x     / x      CARDIAC MARKERS ( 07 May 2019 23:35 )  x     / x     / 85 U/L / x     / 2.8 ng/mL      Urinalysis Basic - ( 08 May 2019 00:17 )    Color: Yellow / Appearance: Slightly Turbid / S.023 / pH: x  Gluc: x / Ketone: Negative  / Bili: Small / Urobili: 3 mg/dL   Blood: x / Protein: 300 mg/dL / Nitrite: Negative   Leuk Esterase: Negative / RBC: 2 /hpf / WBC 7 /HPF   Sq Epi: x / Non Sq Epi: 1 /hpf / Bacteria: Negative        TELEMETRY:     EKG:     IMAGING: PATIENT:  NIK GRIMM  [id number]    CHIEF COMPLAINT:  Patient is a 68y old  Male who presents with a chief complaint of confusion (09 May 2019 02:54)      INTERVAL HISTORY/OVERNIGHT EVENTS:  Patient became agitated overnight, requiring sedation w/ haloperidol and lorazepam. Became hypoxemic and required intubation.   Went into complete heart block, became hypotensive and required TVP. HR and BP improved after pacing.       REVIEW OF SYSTEMS:    Constitutional:     [ ] negative [ ] fevers [ ] chills [ ] weight loss [ ] weight gain  HEENT:                  [ ] negative [ ] dry eyes [ ] eye irritation [ ] postnasal drip [ ] nasal congestion  CV:                         [ ] negative  [ ] chest pain [ ] orthopnea [ ] palpitations [ ] murmur  Resp:                     [ ] negative [ ] cough [ ] shortness of breath [ ] dyspnea [ ] wheezing [ ] sputum [ ] hemoptysis  GI:                          [ ] negative [ ] nausea [ ] vomiting [ ] diarrhea [ ] constipation [ ] abd pain [ ] dysphagia   :                        [ ] negative [ ] dysuria [ ] nocturia [ ] hematuria [ ] increased urinary frequency  Musculoskeletal: [ ] negative [ ] back pain [ ] myalgias [ ] arthralgias [ ] fracture  Skin:                       [ ] negative [ ] rash [ ] itch  Neurological:        [ ] negative [ ] headache [ ] dizziness [ ] syncope [ ] weakness [ ] numbness  Psychiatric:           [ ] negative [ ] anxiety [ ] depression  Endocrine:            [ ] negative [ ] diabetes [ ] thyroid problem  Heme/Lymph:      [ ] negative [ ] anemia [ ] bleeding problem  Allergic/Immune: [ ] negative [ ] itchy eyes [ ] nasal discharge [ ] hives [ ] angioedema    [ ] All other systems negative  [X ] Unable to assess ROS because __sedated______.    MEDICATIONS:  MEDICATIONS  (STANDING):  allopurinol 300 milliGRAM(s) Oral daily  aspirin enteric coated 81 milliGRAM(s) Oral daily  dextrose 5%. 1000 milliLiter(s) (50 mL/Hr) IV Continuous <Continuous>  dextrose 50% Injectable 12.5 Gram(s) IV Push once  fentaNYL   Infusion. 0.5 MICROgram(s)/kG/Hr (2.427 mL/Hr) IV Continuous <Continuous>  folic acid 1 milliGRAM(s) Oral daily  furosemide   Injectable 80 milliGRAM(s) IV Push two times a day  heparin  Injectable 5000 Unit(s) SubCutaneous every 12 hours  insulin lispro (HumaLOG) corrective regimen sliding scale   SubCutaneous three times a day before meals  midazolam Infusion 0.02 mG/kG/Hr (1.942 mL/Hr) IV Continuous <Continuous>    MEDICATIONS  (PRN):  dextrose 40% Gel 15 Gram(s) Oral once PRN Blood Glucose LESS THAN 70 milliGRAM(s)/deciliter  glucagon  Injectable 1 milliGRAM(s) IntraMuscular once PRN Glucose LESS THAN 70 milligrams/deciliter  sodium chloride 0.9% lock flush 10 milliLiter(s) IV Push every 1 hour PRN Pre/post blood products, medications, blood draw, and to maintain line patency      ALLERGIES:  Allergies    No Known Allergies    Intolerances        OBJECTIVE:  ICU Vital Signs Last 24 Hrs  T(C): 35.9 (09 May 2019 07:00), Max: 37.2 (09 May 2019 04:00)  T(F): 96.6 (09 May 2019 07:00), Max: 99 (09 May 2019 04:00)  HR: 80 (09 May 2019 07:45) (38 - 80)  BP: 118/72 (09 May 2019 07:45) (83/65 - 180/87)  BP(mean): 85 (09 May 2019 07:45) (63 - 126)  ABP: --  ABP(mean): --  RR: 16 (09 May 2019 07:45) (0 - 37)  SpO2: 99% (09 May 2019 07:45) (91% - 100%)    Mode: AC/ CMV (Assist Control/ Continuous Mandatory Ventilation)  RR (machine): 12  TV (machine): 500  FiO2: 35  PEEP: 5  ITime: 1  MAP: 9  PIP: 21    Adult Advanced Hemodynamics Last 24 Hrs  CVP(mm Hg): 9 (09 May 2019 07:45) (9 - 18)  CVP(cm H2O): --  CO: --  CI: --  PA: --  PA(mean): --  PCWP: --  SVR: --  SVRI: --  PVR: --  PVRI: --  CAPILLARY BLOOD GLUCOSE      POCT Blood Glucose.: 112 mg/dL (09 May 2019 08:24)  POCT Blood Glucose.: 146 mg/dL (08 May 2019 21:36)  POCT Blood Glucose.: 127 mg/dL (08 May 2019 17:39)  POCT Blood Glucose.: 161 mg/dL (08 May 2019 12:55)  POCT Blood Glucose.: 91 mg/dL (08 May 2019 08:57)    CAPILLARY BLOOD GLUCOSE      POCT Blood Glucose.: 112 mg/dL (09 May 2019 08:24)    I&O's Summary    08 May 2019 07:01  -  09 May 2019 07:00  --------------------------------------------------------  IN: 685.8 mL / OUT: 585 mL / NET: 100.8 mL    09 May 2019 07:01  -  09 May 2019 08:37  --------------------------------------------------------  IN: 19.2 mL / OUT: 275 mL / NET: -255.8 mL      Daily     Daily Weight in k.5 (09 May 2019 00:45)    PHYSICAL EXAMINATION:  General: WN/WD NAD, sedated  HEENT: PERRLA, EOMI, moist mucous membranes  Neurology: sedated  Respiratory: CTA B/L, normal respiratory effort, no wheezes, crackles, rales  CV: +JVD, RRR, S1S2, no murmurs, rubs or gallops  Abdominal: Soft, NT, ND +BS, Last BM  Extremities: bilateral LE edema 2+, + peripheral pulses  Incisions: L IJ 5/8, R TVP 5/8  Tubes: ET, OG    LABS:  ABG - ( 09 May 2019 04:50 )  pH, Arterial: 7.39  pH, Blood: x     /  pCO2: 41    /  pO2: 99    / HCO3: 24    / Base Excess: -.3   /  SaO2: 98                                      10.5   6.5   )-----------( 145      ( 09 May 2019 05:09 )             32.0         140  |  108  |  26<H>  ----------------------------<  150<H>  4.4   |  22  |  1.82<H>    Ca    8.4      09 May 2019 05:09  Phos  5.0       Mg     2.1         TPro  6.0  /  Alb  2.9<L>  /  TBili  0.6  /  DBili  x   /  AST  16  /  ALT  10  /  AlkPhos  126<H>      LIVER FUNCTIONS - ( 09 May 2019 05:09 )  Alb: 2.9 g/dL / Pro: 6.0 g/dL / ALK PHOS: 126 U/L / ALT: 10 U/L / AST: 16 U/L / GGT: x           PT/INR - ( 08 May 2019 23:02 )   PT: 18.4 sec;   INR: 1.58 ratio         PTT - ( 08 May 2019 23:02 )  PTT:30.0 sec  Creatine Kinase, Serum: 75 U/L ( @ 05:09)    CARDIAC MARKERS ( 09 May 2019 05:09 )  x     / x     / 75 U/L / x     / x      CARDIAC MARKERS ( 07 May 2019 23:35 )  x     / x     / 85 U/L / x     / 2.8 ng/mL      Urinalysis Basic - ( 08 May 2019 00:17 )    Color: Yellow / Appearance: Slightly Turbid / S.023 / pH: x  Gluc: x / Ketone: Negative  / Bili: Small / Urobili: 3 mg/dL   Blood: x / Protein: 300 mg/dL / Nitrite: Negative   Leuk Esterase: Negative / RBC: 2 /hpf / WBC 7 /HPF   Sq Epi: x / Non Sq Epi: 1 /hpf / Bacteria: Negative        TELEMETRY:     EKG: V paced 80s    IMAGING:

## 2019-05-10 LAB
ALBUMIN SERPL ELPH-MCNC: 2.9 G/DL — LOW (ref 3.3–5)
ALP SERPL-CCNC: 120 U/L — SIGNIFICANT CHANGE UP (ref 40–120)
ALT FLD-CCNC: 11 U/L — SIGNIFICANT CHANGE UP (ref 10–45)
ANION GAP SERPL CALC-SCNC: 11 MMOL/L — SIGNIFICANT CHANGE UP (ref 5–17)
APPEARANCE UR: CLEAR — SIGNIFICANT CHANGE UP
APTT BLD: 30.7 SEC — SIGNIFICANT CHANGE UP (ref 27.5–36.3)
AST SERPL-CCNC: 13 U/L — SIGNIFICANT CHANGE UP (ref 10–40)
BASE EXCESS BLDV CALC-SCNC: -1.4 MMOL/L — SIGNIFICANT CHANGE UP (ref -2–2)
BASOPHILS # BLD AUTO: 0 K/UL — SIGNIFICANT CHANGE UP (ref 0–0.2)
BASOPHILS NFR BLD AUTO: 0.4 % — SIGNIFICANT CHANGE UP (ref 0–2)
BILIRUB SERPL-MCNC: 0.9 MG/DL — SIGNIFICANT CHANGE UP (ref 0.2–1.2)
BILIRUB UR-MCNC: NEGATIVE — SIGNIFICANT CHANGE UP
BUN SERPL-MCNC: 20 MG/DL — SIGNIFICANT CHANGE UP (ref 7–23)
CALCIUM SERPL-MCNC: 8.2 MG/DL — LOW (ref 8.4–10.5)
CHLORIDE SERPL-SCNC: 107 MMOL/L — SIGNIFICANT CHANGE UP (ref 96–108)
CO2 BLDV-SCNC: 24 MMOL/L — SIGNIFICANT CHANGE UP (ref 22–30)
CO2 SERPL-SCNC: 24 MMOL/L — SIGNIFICANT CHANGE UP (ref 22–31)
COLOR SPEC: SIGNIFICANT CHANGE UP
CREAT SERPL-MCNC: 1.6 MG/DL — HIGH (ref 0.5–1.3)
DIFF PNL FLD: ABNORMAL
EOSINOPHIL # BLD AUTO: 0.1 K/UL — SIGNIFICANT CHANGE UP (ref 0–0.5)
EOSINOPHIL NFR BLD AUTO: 1.3 % — SIGNIFICANT CHANGE UP (ref 0–6)
GAS PNL BLDA: SIGNIFICANT CHANGE UP
GLUCOSE BLDC GLUCOMTR-MCNC: 103 MG/DL — HIGH (ref 70–99)
GLUCOSE BLDC GLUCOMTR-MCNC: 81 MG/DL — SIGNIFICANT CHANGE UP (ref 70–99)
GLUCOSE BLDC GLUCOMTR-MCNC: 84 MG/DL — SIGNIFICANT CHANGE UP (ref 70–99)
GLUCOSE BLDC GLUCOMTR-MCNC: 88 MG/DL — SIGNIFICANT CHANGE UP (ref 70–99)
GLUCOSE BLDC GLUCOMTR-MCNC: 90 MG/DL — SIGNIFICANT CHANGE UP (ref 70–99)
GLUCOSE SERPL-MCNC: 110 MG/DL — HIGH (ref 70–99)
GLUCOSE UR QL: NEGATIVE — SIGNIFICANT CHANGE UP
HCO3 BLDV-SCNC: 23 MMOL/L — SIGNIFICANT CHANGE UP (ref 21–29)
HCT VFR BLD CALC: 36 % — LOW (ref 39–50)
HGB BLD-MCNC: 11.2 G/DL — LOW (ref 13–17)
HOROWITZ INDEX BLDV+IHG-RTO: 35 — SIGNIFICANT CHANGE UP
INR BLD: 1.5 RATIO — HIGH (ref 0.88–1.16)
KETONES UR-MCNC: NEGATIVE — SIGNIFICANT CHANGE UP
LEUKOCYTE ESTERASE UR-ACNC: ABNORMAL
LYMPHOCYTES # BLD AUTO: 0.6 K/UL — LOW (ref 1–3.3)
LYMPHOCYTES # BLD AUTO: 7.6 % — LOW (ref 13–44)
MAGNESIUM SERPL-MCNC: 1.8 MG/DL — SIGNIFICANT CHANGE UP (ref 1.6–2.6)
MCHC RBC-ENTMCNC: 27.9 PG — SIGNIFICANT CHANGE UP (ref 27–34)
MCHC RBC-ENTMCNC: 31.1 GM/DL — LOW (ref 32–36)
MCV RBC AUTO: 89.6 FL — SIGNIFICANT CHANGE UP (ref 80–100)
MONOCYTES # BLD AUTO: 0.6 K/UL — SIGNIFICANT CHANGE UP (ref 0–0.9)
MONOCYTES NFR BLD AUTO: 6.8 % — SIGNIFICANT CHANGE UP (ref 2–14)
NEUTROPHILS # BLD AUTO: 7.2 K/UL — SIGNIFICANT CHANGE UP (ref 1.8–7.4)
NEUTROPHILS NFR BLD AUTO: 83.9 % — HIGH (ref 43–77)
NITRITE UR-MCNC: NEGATIVE — SIGNIFICANT CHANGE UP
PCO2 BLDV: 42 MMHG — SIGNIFICANT CHANGE UP (ref 35–50)
PH BLDV: 7.37 — SIGNIFICANT CHANGE UP (ref 7.35–7.45)
PH UR: 6 — SIGNIFICANT CHANGE UP (ref 5–8)
PHOSPHATE SERPL-MCNC: 4 MG/DL — SIGNIFICANT CHANGE UP (ref 2.5–4.5)
PLATELET # BLD AUTO: 150 K/UL — SIGNIFICANT CHANGE UP (ref 150–400)
PO2 BLDV: 56 MMHG — HIGH (ref 25–45)
POTASSIUM SERPL-MCNC: 3.6 MMOL/L — SIGNIFICANT CHANGE UP (ref 3.5–5.3)
POTASSIUM SERPL-SCNC: 3.6 MMOL/L — SIGNIFICANT CHANGE UP (ref 3.5–5.3)
PROT SERPL-MCNC: 6.1 G/DL — SIGNIFICANT CHANGE UP (ref 6–8.3)
PROT UR-MCNC: ABNORMAL
PROTHROM AB SERPL-ACNC: 17.3 SEC — HIGH (ref 10–12.9)
RBC # BLD: 4.02 M/UL — LOW (ref 4.2–5.8)
RBC # FLD: 15.2 % — HIGH (ref 10.3–14.5)
SAO2 % BLDV: 85 % — SIGNIFICANT CHANGE UP (ref 67–88)
SODIUM SERPL-SCNC: 142 MMOL/L — SIGNIFICANT CHANGE UP (ref 135–145)
SP GR SPEC: 1.01 — SIGNIFICANT CHANGE UP (ref 1.01–1.02)
UROBILINOGEN FLD QL: NEGATIVE — SIGNIFICANT CHANGE UP
WBC # BLD: 8.6 K/UL — SIGNIFICANT CHANGE UP (ref 3.8–10.5)
WBC # FLD AUTO: 8.6 K/UL — SIGNIFICANT CHANGE UP (ref 3.8–10.5)

## 2019-05-10 PROCEDURE — 99291 CRITICAL CARE FIRST HOUR: CPT

## 2019-05-10 PROCEDURE — 95951: CPT | Mod: 26,52

## 2019-05-10 PROCEDURE — 93325 DOPPLER ECHO COLOR FLOW MAPG: CPT | Mod: 26

## 2019-05-10 PROCEDURE — 93320 DOPPLER ECHO COMPLETE: CPT | Mod: 26

## 2019-05-10 PROCEDURE — 71045 X-RAY EXAM CHEST 1 VIEW: CPT | Mod: 26,77

## 2019-05-10 PROCEDURE — 93312 ECHO TRANSESOPHAGEAL: CPT | Mod: 26

## 2019-05-10 PROCEDURE — 71045 X-RAY EXAM CHEST 1 VIEW: CPT | Mod: 26

## 2019-05-10 PROCEDURE — 33274 TCAT INSJ/RPL PERM LDLS PM: CPT | Mod: Q0

## 2019-05-10 RX ORDER — PANTOPRAZOLE SODIUM 20 MG/1
40 TABLET, DELAYED RELEASE ORAL AT BEDTIME
Refills: 0 | Status: DISCONTINUED | OUTPATIENT
Start: 2019-05-10 | End: 2019-05-11

## 2019-05-10 RX ORDER — POTASSIUM CHLORIDE 20 MEQ
10 PACKET (EA) ORAL ONCE
Refills: 0 | Status: DISCONTINUED | OUTPATIENT
Start: 2019-05-10 | End: 2019-05-10

## 2019-05-10 RX ORDER — SODIUM CHLORIDE 9 MG/ML
500 INJECTION INTRAMUSCULAR; INTRAVENOUS; SUBCUTANEOUS ONCE
Refills: 0 | Status: COMPLETED | OUTPATIENT
Start: 2019-05-10 | End: 2019-05-10

## 2019-05-10 RX ORDER — MIDAZOLAM HYDROCHLORIDE 1 MG/ML
2 INJECTION, SOLUTION INTRAMUSCULAR; INTRAVENOUS ONCE
Refills: 0 | Status: DISCONTINUED | OUTPATIENT
Start: 2019-05-10 | End: 2019-05-10

## 2019-05-10 RX ORDER — FUROSEMIDE 40 MG
40 TABLET ORAL EVERY 12 HOURS
Refills: 0 | Status: DISCONTINUED | OUTPATIENT
Start: 2019-05-10 | End: 2019-05-11

## 2019-05-10 RX ORDER — POTASSIUM CHLORIDE 20 MEQ
40 PACKET (EA) ORAL ONCE
Refills: 0 | Status: COMPLETED | OUTPATIENT
Start: 2019-05-10 | End: 2019-05-10

## 2019-05-10 RX ORDER — MAGNESIUM SULFATE 500 MG/ML
1 VIAL (ML) INJECTION ONCE
Refills: 0 | Status: COMPLETED | OUTPATIENT
Start: 2019-05-10 | End: 2019-05-10

## 2019-05-10 RX ORDER — ASPIRIN/CALCIUM CARB/MAGNESIUM 324 MG
81 TABLET ORAL DAILY
Refills: 0 | Status: DISCONTINUED | OUTPATIENT
Start: 2019-05-10 | End: 2019-05-24

## 2019-05-10 RX ADMIN — PIPERACILLIN AND TAZOBACTAM 25 GRAM(S): 4; .5 INJECTION, POWDER, LYOPHILIZED, FOR SOLUTION INTRAVENOUS at 05:10

## 2019-05-10 RX ADMIN — PIPERACILLIN AND TAZOBACTAM 25 GRAM(S): 4; .5 INJECTION, POWDER, LYOPHILIZED, FOR SOLUTION INTRAVENOUS at 14:21

## 2019-05-10 RX ADMIN — MIDAZOLAM HYDROCHLORIDE 2 MILLIGRAM(S): 1 INJECTION, SOLUTION INTRAMUSCULAR; INTRAVENOUS at 15:04

## 2019-05-10 RX ADMIN — SODIUM CHLORIDE 500 MILLILITER(S): 9 INJECTION INTRAMUSCULAR; INTRAVENOUS; SUBCUTANEOUS at 00:05

## 2019-05-10 RX ADMIN — Medication 1000 MILLIGRAM(S): at 00:00

## 2019-05-10 RX ADMIN — Medication 1 APPLICATION(S): at 05:11

## 2019-05-10 RX ADMIN — Medication 1 MILLIGRAM(S): at 23:10

## 2019-05-10 RX ADMIN — Medication 250 MILLIGRAM(S): at 00:06

## 2019-05-10 RX ADMIN — Medication 1 APPLICATION(S): at 23:10

## 2019-05-10 RX ADMIN — Medication 300 MILLIGRAM(S): at 23:30

## 2019-05-10 RX ADMIN — PANTOPRAZOLE SODIUM 40 MILLIGRAM(S): 20 TABLET, DELAYED RELEASE ORAL at 23:10

## 2019-05-10 RX ADMIN — Medication 81 MILLIGRAM(S): at 23:10

## 2019-05-10 RX ADMIN — PIPERACILLIN AND TAZOBACTAM 25 GRAM(S): 4; .5 INJECTION, POWDER, LYOPHILIZED, FOR SOLUTION INTRAVENOUS at 23:11

## 2019-05-10 RX ADMIN — PIPERACILLIN AND TAZOBACTAM 25 GRAM(S): 4; .5 INJECTION, POWDER, LYOPHILIZED, FOR SOLUTION INTRAVENOUS at 00:06

## 2019-05-10 RX ADMIN — Medication 40 MILLIGRAM(S): at 23:40

## 2019-05-10 RX ADMIN — HEPARIN SODIUM 5000 UNIT(S): 5000 INJECTION INTRAVENOUS; SUBCUTANEOUS at 05:11

## 2019-05-10 RX ADMIN — Medication 100 GRAM(S): at 05:10

## 2019-05-10 RX ADMIN — Medication 1 APPLICATION(S): at 14:22

## 2019-05-10 RX ADMIN — Medication 250 MILLIGRAM(S): at 12:13

## 2019-05-10 RX ADMIN — Medication 40 MILLIEQUIVALENT(S): at 05:10

## 2019-05-10 RX ADMIN — Medication 250 MILLIGRAM(S): at 23:13

## 2019-05-10 NOTE — PROGRESS NOTE ADULT - SUBJECTIVE AND OBJECTIVE BOX
Patient is a 68y old  Male who presents with a chief complaint of confusion (10 May 2019 11:55)      INTERVAL HPI/OVERNIGHT EVENTS: remains intubated and sedated      Vital Signs Last 24 Hrs  T(C): 37.7 (10 May 2019 18:30), Max: 38.4 (09 May 2019 22:30)  T(F): 99.9 (10 May 2019 18:30), Max: 101.1 (09 May 2019 22:30)  HR: 58 (10 May 2019 18:30) (48 - 62)  BP: 132/57 (10 May 2019 18:30) (69/33 - 162/69)  BP(mean): 77 (10 May 2019 18:30) (51 - 115)  RR: 15 (10 May 2019 18:30) (0 - 27)  SpO2: 99% (10 May 2019 18:30) (93% - 100%)    allopurinol 300 milliGRAM(s) Oral daily  aspirin  chewable 81 milliGRAM(s) Oral daily  dextrose 40% Gel 15 Gram(s) Oral once PRN  dextrose 5%. 1000 milliLiter(s) IV Continuous <Continuous>  dextrose 50% Injectable 12.5 Gram(s) IV Push once  fentaNYL   Infusion. 0.5 MICROgram(s)/kG/Hr IV Continuous <Continuous>  folic acid 1 milliGRAM(s) Oral daily  glucagon  Injectable 1 milliGRAM(s) IntraMuscular once PRN  heparin  Injectable 5000 Unit(s) SubCutaneous every 12 hours  insulin lispro (HumaLOG) corrective regimen sliding scale   SubCutaneous three times a day before meals  midazolam Infusion 0.02 mG/kG/Hr IV Continuous <Continuous>  pantoprazole  Injectable 40 milliGRAM(s) IV Push at bedtime  petrolatum Ophthalmic Ointment 1 Application(s) Both EYES every 8 hours  piperacillin/tazobactam IVPB. 3.375 Gram(s) IV Intermittent every 8 hours  sodium chloride 0.9% lock flush 10 milliLiter(s) IV Push every 1 hour PRN  vancomycin  IVPB 1000 milliGRAM(s) IV Intermittent every 12 hours      PHYSICAL EXAM:  GENERAL: intubated and sedated  EYES: conjunctiva and sclera clear  ENMT: Moist mucous membranes  NECK:  No JVD, Normal thyroid  NERVOUS SYSTEM: sedated    CHEST/LUNG: Clear to auscultation bilaterally ant  HEART: Regular rate and rhythm;systolic murmurs,ABDOMEN: Soft, Nondistended; Bowel sounds present  EXTREMITIES:  edema  L    Consultant(s) Notes Reviewed:  [x ] YES  [ ] NO  Care Discussed with Consultants/Other Providers [ x] YES  [ ] NO    LABS:                        11.2   8.6   )-----------( 150      ( 10 May 2019 04:24 )             36.0     05-10    142  |  107  |  20  ----------------------------<  110<H>  3.6   |  24  |  1.60<H>    Ca    8.2<L>      10 May 2019 04:24  Phos  4.0     05-10  Mg     1.8     05-10    TPro  6.1  /  Alb  2.9<L>  /  TBili  0.9  /  DBili  x   /  AST  13  /  ALT  11  /  AlkPhos  120  05-10    PT/INR - ( 10 May 2019 04:24 )   PT: 17.3 sec;   INR: 1.50 ratio         PTT - ( 10 May 2019 04:24 )  PTT:30.7 sec  Urinalysis Basic - ( 10 May 2019 00:35 )    Color: Light Yellow / Appearance: Clear / S.012 / pH: x  Gluc: x / Ketone: Negative  / Bili: Negative / Urobili: Negative   Blood: x / Protein: 30 mg/dL / Nitrite: Negative   Leuk Esterase: Small / RBC: 1 /hpf / WBC 1 /HPF   Sq Epi: x / Non Sq Epi: 1 / Bacteria: x      CAPILLARY BLOOD GLUCOSE      POCT Blood Glucose.: 103 mg/dL (10 May 2019 17:58)  POCT Blood Glucose.: 84 mg/dL (10 May 2019 12:02)  POCT Blood Glucose.: 90 mg/dL (10 May 2019 08:20)  POCT Blood Glucose.: 88 mg/dL (09 May 2019 23:19)      ABG - ( 10 May 2019 04:19 )  pH, Arterial: 7.42  pH, Blood: x     /  pCO2: 44    /  pO2: 147   / HCO3: 28    / Base Excess: 3.9   /  SaO2: 100               Urinalysis Basic - ( 10 May 2019 00:35 )    Color: Light Yellow / Appearance: Clear / S.012 / pH: x  Gluc: x / Ketone: Negative  / Bili: Negative / Urobili: Negative   Blood: x / Protein: 30 mg/dL / Nitrite: Negative   Leuk Esterase: Small / RBC: 1 /hpf / WBC 1 /HPF   Sq Epi: x / Non Sq Epi: 1 / Bacteria: x        RADIOLOGY & ADDITIONAL TESTS:    Imaging Personally Reviewed:  [ ] YES  [ ] NO

## 2019-05-10 NOTE — PROGRESS NOTE ADULT - SUBJECTIVE AND OBJECTIVE BOX
Patient seen and examined at bedside.    Overnight Events: Pt intubated. EEG started overnight. Febrile overnight.       REVIEW OF SYSTEMS:  Constitutional:     [ ] negative [ ] fevers [ ] chills [ ] weight loss [ ] weight gain  HEENT:                  [ ] negative [ ] dry eyes [ ] eye irritation [ ] postnasal drip [ ] nasal congestion  CV:                         [ ] negative  [ ] chest pain [ ] orthopnea [ ] palpitations [ ] murmur  Resp:                     [ ] negative [ ] cough [ ] shortness of breath [ ] dyspnea [ ] wheezing [ ] sputum [ ]hemoptysis  GI:                          [ ] negative [ ] nausea [ ] vomiting [ ] diarrhea [ ] constipation [ ] abd pain [ ] dysphagia   :                        [ ] negative [ ] dysuria [ ] nocturia [ ] hematuria [ ] increased urinary frequency  Musculoskeletal: [ ] negative [ ] back pain [ ] myalgias [ ] arthralgias [ ] fracture  Skin:                       [ ] negative [ ] rash [ ] itch  Neurological:        [ ] negative [ ] headache [ ] dizziness [ ] syncope [ ] weakness [ ] numbness  Psychiatric:           [ ] negative [ ] anxiety [ ] depression  Endocrine:            [ ] negative [ ] diabetes [ ] thyroid problem  Heme/Lymph:      [ ] negative [ ] anemia [ ] bleeding problem  Allergic/Immune: [ ] negative [ ] itchy eyes [ ] nasal discharge [ ] hives [ ] angioedema    [x ] All other systems negative  [ ] Unable to assess ROS due to    Current Meds:  allopurinol 300 milliGRAM(s) Oral daily  aspirin  chewable 81 milliGRAM(s) Oral daily  dextrose 40% Gel 15 Gram(s) Oral once PRN  dextrose 5%. 1000 milliLiter(s) IV Continuous <Continuous>  dextrose 50% Injectable 12.5 Gram(s) IV Push once  fentaNYL   Infusion. 0.5 MICROgram(s)/kG/Hr IV Continuous <Continuous>  folic acid 1 milliGRAM(s) Oral daily  glucagon  Injectable 1 milliGRAM(s) IntraMuscular once PRN  heparin  Injectable 5000 Unit(s) SubCutaneous every 12 hours  insulin lispro (HumaLOG) corrective regimen sliding scale   SubCutaneous three times a day before meals  midazolam Infusion 0.02 mG/kG/Hr IV Continuous <Continuous>  pantoprazole  Injectable 40 milliGRAM(s) IV Push at bedtime  petrolatum Ophthalmic Ointment 1 Application(s) Both EYES every 8 hours  piperacillin/tazobactam IVPB. 3.375 Gram(s) IV Intermittent every 8 hours  sodium chloride 0.9% lock flush 10 milliLiter(s) IV Push every 1 hour PRN  vancomycin  IVPB 1000 milliGRAM(s) IV Intermittent every 12 hours      PAST MEDICAL & SURGICAL HISTORY:  Pleural effusion  Moderate mitral regurgitation  Systolic heart failure  Rhinitis, allergic  Essential hypertension  DM type 2 (diabetes mellitus, type 2)  Non-Hodgkins Lymphoma  Non-Hodgkin lymphoma: h/o axillary dissection      Vitals:  T(F): 97.9 (05-10), Max: 101.1 (05-09)  HR: 58 (05-10) (56 - 70)  BP: 93/47 (05-10) (69/33 - 162/69)  RR: 14 (05-10)  SpO2: 100% (05-10)  I&O's Summary    09 May 2019 07:01  -  10 May 2019 07:00  --------------------------------------------------------  IN: 1567.3 mL / OUT: 5175 mL / NET: -3607.7 mL    10 May 2019 07:01  -  10 May 2019 10:20  --------------------------------------------------------  IN: 80.5 mL / OUT: 675 mL / NET: -594.5 mL        Physical Exam:  Appearance: intubated.   Eyes: PERRL, EOMI, pink conjunctiva  HENT: Normal oral mucosa  Cardiovascular: RRR, S1, S2, no murmurs, rubs, or gallops; 2+ edema; no JVD  Respiratory: Clear to auscultation bilaterally  Gastrointestinal: soft, non-tender, non-distended with normal bowel sounds  Musculoskeletal: No clubbing; no joint deformity   Neurologic: Non-focal  Lymphatic: No lymphadenopathy  Psychiatry: AAOx3, mood & affect appropriate  Skin: No rashes, ecchymoses, or cyanosis                          11.2   8.6   )-----------( 150      ( 10 May 2019 04:24 )             36.0     05-10    142  |  107  |  20  ----------------------------<  110<H>  3.6   |  24  |  1.60<H>    Ca    8.2<L>      10 May 2019 04:24  Phos  4.0     05-10  Mg     1.8     05-10    TPro  6.1  /  Alb  2.9<L>  /  TBili  0.9  /  DBili  x   /  AST  13  /  ALT  11  /  AlkPhos  120  05-10    PT/INR - ( 10 May 2019 04:24 )   PT: 17.3 sec;   INR: 1.50 ratio         PTT - ( 10 May 2019 04:24 )  PTT:30.7 sec  CARDIAC MARKERS ( 09 May 2019 05:09 )  x     / x     / 75 U/L / x     / x              Hemoglobin A1C, Whole Blood: 6.0 % (05-09 @ 10:52)      New ECG(s): Personally reviewed    Echo:  < from: Transthoracic Echocardiogram (05.08.19 @ 15:16) >  Conclusions:  Suboptimal apical windows.  Estimated LV ejection fraction 45%.  Eccentric mitral regurgitation directed posteriorly,  probably severe.  Moderate right ventricular enlargement. Study quality  precludes accurate assessment of right ventricular systolic  function.  Severe pulmonary hypertension. Estimated PASP = 75 mmHg.  ------------------------------------------------------------------------  Confirmed on  5/8/2019 - 18:04:38 by Juan Alberto Garcia M.D.  ------------------------------------------------------------------------    < end of copied text >      Interpretation of Telemetry: Pacing

## 2019-05-10 NOTE — PROGRESS NOTE ADULT - SUBJECTIVE AND OBJECTIVE BOX
PATIENT:  NIK GRIMM  [id number]    CHIEF COMPLAINT:  Patient is a 68y old  Male who presents with a chief complaint of confusion (09 May 2019 22:11)      INTERVAL HISTORY/OVERNIGHT EVENTS:  Febrile overnight and was started on pip-tazo and vanc.  Hypotensive s/p overdiuresis which responded well to 500mL.       REVIEW OF SYSTEMS:    Constitutional:     [ ] negative [ ] fevers [ ] chills [ ] weight loss [ ] weight gain  HEENT:                  [ ] negative [ ] dry eyes [ ] eye irritation [ ] postnasal drip [ ] nasal congestion  CV:                         [ ] negative  [ ] chest pain [ ] orthopnea [ ] palpitations [ ] murmur  Resp:                     [ ] negative [ ] cough [ ] shortness of breath [ ] dyspnea [ ] wheezing [ ] sputum [ ] hemoptysis  GI:                          [ ] negative [ ] nausea [ ] vomiting [ ] diarrhea [ ] constipation [ ] abd pain [ ] dysphagia   :                        [ ] negative [ ] dysuria [ ] nocturia [ ] hematuria [ ] increased urinary frequency  Musculoskeletal: [ ] negative [ ] back pain [ ] myalgias [ ] arthralgias [ ] fracture  Skin:                       [ ] negative [ ] rash [ ] itch  Neurological:        [ ] negative [ ] headache [ ] dizziness [ ] syncope [ ] weakness [ ] numbness  Psychiatric:           [ ] negative [ ] anxiety [ ] depression  Endocrine:            [ ] negative [ ] diabetes [ ] thyroid problem  Heme/Lymph:      [ ] negative [ ] anemia [ ] bleeding problem  Allergic/Immune: [ ] negative [ ] itchy eyes [ ] nasal discharge [ ] hives [ ] angioedema    [ ] All other systems negative  [ X] Unable to assess ROS because __intubated and sedated____.    MEDICATIONS:  MEDICATIONS  (STANDING):  allopurinol 300 milliGRAM(s) Oral daily  aspirin  chewable 81 milliGRAM(s) Oral daily  dextrose 5%. 1000 milliLiter(s) (50 mL/Hr) IV Continuous <Continuous>  dextrose 50% Injectable 12.5 Gram(s) IV Push once  fentaNYL   Infusion. 0.5 MICROgram(s)/kG/Hr (2.427 mL/Hr) IV Continuous <Continuous>  folic acid 1 milliGRAM(s) Oral daily  heparin  Injectable 5000 Unit(s) SubCutaneous every 12 hours  insulin lispro (HumaLOG) corrective regimen sliding scale   SubCutaneous three times a day before meals  midazolam Infusion 0.02 mG/kG/Hr (1.942 mL/Hr) IV Continuous <Continuous>  pantoprazole  Injectable 40 milliGRAM(s) IV Push at bedtime  petrolatum Ophthalmic Ointment 1 Application(s) Both EYES every 8 hours  piperacillin/tazobactam IVPB. 3.375 Gram(s) IV Intermittent every 8 hours  vancomycin  IVPB 1000 milliGRAM(s) IV Intermittent every 12 hours    MEDICATIONS  (PRN):  dextrose 40% Gel 15 Gram(s) Oral once PRN Blood Glucose LESS THAN 70 milliGRAM(s)/deciliter  glucagon  Injectable 1 milliGRAM(s) IntraMuscular once PRN Glucose LESS THAN 70 milligrams/deciliter  sodium chloride 0.9% lock flush 10 milliLiter(s) IV Push every 1 hour PRN Pre/post blood products, medications, blood draw, and to maintain line patency      ALLERGIES:  Allergies    No Known Allergies    Intolerances        OBJECTIVE:  ICU Vital Signs Last 24 Hrs  T(C): 36.1 (10 May 2019 04:00), Max: 38.4 (09 May 2019 22:30)  T(F): 97 (10 May 2019 04:00), Max: 101.1 (09 May 2019 22:30)  HR: 60 (10 May 2019 06:30) (56 - 80)  BP: 102/50 (10 May 2019 06:30) (69/33 - 162/69)  BP(mean): 69 (10 May 2019 06:30) (51 - 106)  ABP: --  ABP(mean): --  RR: 14 (10 May 2019 06:30) (11 - 20)  SpO2: 100% (10 May 2019 06:30) (93% - 100%)    Mode: AC/ CMV (Assist Control/ Continuous Mandatory Ventilation)  RR (machine): 12  TV (machine): 500  FiO2: 35  PEEP: 5  ITime: 1  MAP: 8  PIP: 11    Adult Advanced Hemodynamics Last 24 Hrs  CVP(mm Hg): 7 (10 May 2019 06:30) (1 - 13)  CVP(cm H2O): --  CO: --  CI: --  PA: --  PA(mean): --  PCWP: --  SVR: --  SVRI: --  PVR: --  PVRI: --  CAPILLARY BLOOD GLUCOSE      POCT Blood Glucose.: 88 mg/dL (09 May 2019 23:19)  POCT Blood Glucose.: 80 mg/dL (09 May 2019 16:06)  POCT Blood Glucose.: 69 mg/dL (09 May 2019 16:05)  POCT Blood Glucose.: 84 mg/dL (09 May 2019 11:27)  POCT Blood Glucose.: 112 mg/dL (09 May 2019 08:24)    CAPILLARY BLOOD GLUCOSE      POCT Blood Glucose.: 88 mg/dL (09 May 2019 23:19)    I&O's Summary    09 May 2019 07:01  -  10 May 2019 07:00  --------------------------------------------------------  IN: 1542.3 mL / OUT: 5175 mL / NET: -3632.7 mL      Daily     Daily     PHYSICAL EXAMINATION:  General: WN/WD NAD, sedated  HEENT: PERRLA, EOMI, moist mucous membranes  Neurology: sedated  Respiratory: CTA B/L, normal respiratory effort, no wheezes, crackles, rales  CV: +JVD, RRR, S1S2, no murmurs, rubs or gallops  Abdominal: Soft, NT, ND +BS, Last BM  Extremities: bilateral LE edema 2+, + peripheral pulses  Incisions: L IJ 5/8, R TVP 5/8  Tubes: ET, OG      LABS:  ABG - ( 10 May 2019 04:19 )  pH, Arterial: 7.42  pH, Blood: x     /  pCO2: 44    /  pO2: 147   / HCO3: 28    / Base Excess: 3.9   /  SaO2: 100                                     11.2   8.6   )-----------( 150      ( 10 May 2019 04:24 )             36.0     05-10    142  |  107  |  20  ----------------------------<  110<H>  3.6   |  24  |  1.60<H>    Ca    8.2<L>      10 May 2019 04:24  Phos  4.0     05-10  Mg     1.8     05-10    TPro  6.1  /  Alb  2.9<L>  /  TBili  0.9  /  DBili  x   /  AST  13  /  ALT  11  /  AlkPhos  120  05-10    LIVER FUNCTIONS - ( 10 May 2019 04:24 )  Alb: 2.9 g/dL / Pro: 6.1 g/dL / ALK PHOS: 120 U/L / ALT: 11 U/L / AST: 13 U/L / GGT: x           PT/INR - ( 10 May 2019 04:24 )   PT: 17.3 sec;   INR: 1.50 ratio         PTT - ( 10 May 2019 04:24 )  PTT:30.7 sec    CARDIAC MARKERS ( 09 May 2019 05:09 )  x     / x     / 75 U/L / x     / x          Urinalysis Basic - ( 10 May 2019 00:35 )    Color: Light Yellow / Appearance: Clear / S.012 / pH: x  Gluc: x / Ketone: Negative  / Bili: Negative / Urobili: Negative   Blood: x / Protein: 30 mg/dL / Nitrite: Negative   Leuk Esterase: Small / RBC: 1 /hpf / WBC 1 /HPF   Sq Epi: x / Non Sq Epi: 1 / Bacteria: x        TELEMETRY:     EKG:     IMAGING: PATIENT:  NIK GRIMM  [id number]    CHIEF COMPLAINT:  Patient is a 68y old  Male who presents with a chief complaint of confusion (09 May 2019 22:11)      INTERVAL HISTORY/OVERNIGHT EVENTS:  Febrile overnight and was started on pip-tazo and vanc.  Hypotensive s/p overdiuresis which responded well to 500mL.   Started on EEG overnight.      REVIEW OF SYSTEMS:    Constitutional:     [ ] negative [ ] fevers [ ] chills [ ] weight loss [ ] weight gain  HEENT:                  [ ] negative [ ] dry eyes [ ] eye irritation [ ] postnasal drip [ ] nasal congestion  CV:                         [ ] negative  [ ] chest pain [ ] orthopnea [ ] palpitations [ ] murmur  Resp:                     [ ] negative [ ] cough [ ] shortness of breath [ ] dyspnea [ ] wheezing [ ] sputum [ ] hemoptysis  GI:                          [ ] negative [ ] nausea [ ] vomiting [ ] diarrhea [ ] constipation [ ] abd pain [ ] dysphagia   :                        [ ] negative [ ] dysuria [ ] nocturia [ ] hematuria [ ] increased urinary frequency  Musculoskeletal: [ ] negative [ ] back pain [ ] myalgias [ ] arthralgias [ ] fracture  Skin:                       [ ] negative [ ] rash [ ] itch  Neurological:        [ ] negative [ ] headache [ ] dizziness [ ] syncope [ ] weakness [ ] numbness  Psychiatric:           [ ] negative [ ] anxiety [ ] depression  Endocrine:            [ ] negative [ ] diabetes [ ] thyroid problem  Heme/Lymph:      [ ] negative [ ] anemia [ ] bleeding problem  Allergic/Immune: [ ] negative [ ] itchy eyes [ ] nasal discharge [ ] hives [ ] angioedema    [ ] All other systems negative  [ X] Unable to assess ROS because __intubated and sedated____.    MEDICATIONS:  MEDICATIONS  (STANDING):  allopurinol 300 milliGRAM(s) Oral daily  aspirin  chewable 81 milliGRAM(s) Oral daily  dextrose 5%. 1000 milliLiter(s) (50 mL/Hr) IV Continuous <Continuous>  dextrose 50% Injectable 12.5 Gram(s) IV Push once  fentaNYL   Infusion. 0.5 MICROgram(s)/kG/Hr (2.427 mL/Hr) IV Continuous <Continuous>  folic acid 1 milliGRAM(s) Oral daily  heparin  Injectable 5000 Unit(s) SubCutaneous every 12 hours  insulin lispro (HumaLOG) corrective regimen sliding scale   SubCutaneous three times a day before meals  midazolam Infusion 0.02 mG/kG/Hr (1.942 mL/Hr) IV Continuous <Continuous>  pantoprazole  Injectable 40 milliGRAM(s) IV Push at bedtime  petrolatum Ophthalmic Ointment 1 Application(s) Both EYES every 8 hours  piperacillin/tazobactam IVPB. 3.375 Gram(s) IV Intermittent every 8 hours  vancomycin  IVPB 1000 milliGRAM(s) IV Intermittent every 12 hours    MEDICATIONS  (PRN):  dextrose 40% Gel 15 Gram(s) Oral once PRN Blood Glucose LESS THAN 70 milliGRAM(s)/deciliter  glucagon  Injectable 1 milliGRAM(s) IntraMuscular once PRN Glucose LESS THAN 70 milligrams/deciliter  sodium chloride 0.9% lock flush 10 milliLiter(s) IV Push every 1 hour PRN Pre/post blood products, medications, blood draw, and to maintain line patency      ALLERGIES:  Allergies    No Known Allergies    Intolerances        OBJECTIVE:  ICU Vital Signs Last 24 Hrs  T(C): 36.1 (10 May 2019 04:00), Max: 38.4 (09 May 2019 22:30)  T(F): 97 (10 May 2019 04:00), Max: 101.1 (09 May 2019 22:30)  HR: 60 (10 May 2019 06:30) (56 - 80)  BP: 102/50 (10 May 2019 06:30) (69/33 - 162/69)  BP(mean): 69 (10 May 2019 06:30) (51 - 106)  ABP: --  ABP(mean): --  RR: 14 (10 May 2019 06:30) (11 - 20)  SpO2: 100% (10 May 2019 06:30) (93% - 100%)    Mode: AC/ CMV (Assist Control/ Continuous Mandatory Ventilation)  RR (machine): 12  TV (machine): 500  FiO2: 35  PEEP: 5  ITime: 1  MAP: 8  PIP: 11    Adult Advanced Hemodynamics Last 24 Hrs  CVP(mm Hg): 7 (10 May 2019 06:30) (1 - 13)  CVP(cm H2O): --  CO: --  CI: --  PA: --  PA(mean): --  PCWP: --  SVR: --  SVRI: --  PVR: --  PVRI: --  CAPILLARY BLOOD GLUCOSE      POCT Blood Glucose.: 88 mg/dL (09 May 2019 23:19)  POCT Blood Glucose.: 80 mg/dL (09 May 2019 16:06)  POCT Blood Glucose.: 69 mg/dL (09 May 2019 16:05)  POCT Blood Glucose.: 84 mg/dL (09 May 2019 11:27)  POCT Blood Glucose.: 112 mg/dL (09 May 2019 08:24)    CAPILLARY BLOOD GLUCOSE      POCT Blood Glucose.: 88 mg/dL (09 May 2019 23:19)    I&O's Summary    09 May 2019 07:01  -  10 May 2019 07:00  --------------------------------------------------------  IN: 1542.3 mL / OUT: 5175 mL / NET: -3632.7 mL      Daily     Daily     PHYSICAL EXAMINATION:  General: WN/WD NAD, sedated  HEENT: constricted pupils, moist mucous membranes  Neurology: sedated  Respiratory: CTA B/L, normal respiratory effort, no wheezes, crackles, rales  CV: +JVD, RRR, S1S2, no murmurs, rubs or gallops  Abdominal: Soft, NT, ND +BS, Last BM  Extremities: bilateral LE edema 2+, + peripheral pulses  Incisions: L IJ 5/8, R TVP 5/8  Tubes: ET, OG      LABS:  ABG - ( 10 May 2019 04:19 )  pH, Arterial: 7.42  pH, Blood: x     /  pCO2: 44    /  pO2: 147   / HCO3: 28    / Base Excess: 3.9   /  SaO2: 100                                     11.2   8.6   )-----------( 150      ( 10 May 2019 04:24 )             36.0     05-10    142  |  107  |  20  ----------------------------<  110<H>  3.6   |  24  |  1.60<H>    Ca    8.2<L>      10 May 2019 04:24  Phos  4.0     05-10  Mg     1.8     05-10    TPro  6.1  /  Alb  2.9<L>  /  TBili  0.9  /  DBili  x   /  AST  13  /  ALT  11  /  AlkPhos  120  05-10    LIVER FUNCTIONS - ( 10 May 2019 04:24 )  Alb: 2.9 g/dL / Pro: 6.1 g/dL / ALK PHOS: 120 U/L / ALT: 11 U/L / AST: 13 U/L / GGT: x           PT/INR - ( 10 May 2019 04:24 )   PT: 17.3 sec;   INR: 1.50 ratio         PTT - ( 10 May 2019 04:24 )  PTT:30.7 sec    CARDIAC MARKERS ( 09 May 2019 05:09 )  x     / x     / 75 U/L / x     / x          Urinalysis Basic - ( 10 May 2019 00:35 )    Color: Light Yellow / Appearance: Clear / S.012 / pH: x  Gluc: x / Ketone: Negative  / Bili: Negative / Urobili: Negative   Blood: x / Protein: 30 mg/dL / Nitrite: Negative   Leuk Esterase: Small / RBC: 1 /hpf / WBC 1 /HPF   Sq Epi: x / Non Sq Epi: 1 / Bacteria: x        TELEMETRY:     EKG:     IMAGING:

## 2019-05-10 NOTE — DIETITIAN INITIAL EVALUATION ADULT. - ENERGY NEEDS
ht: 72 inches, weight: 213.6 lbs, BMI: 29 kg/m2, IBW: 178 lbs (+/- 10%), %IBW: 119%  Edema: 2+ L/R foot, 3+ L/R leg  Skin per nursing documentation: no pressure injuries noted  GI: last BM ?PTA, [facility] equation: 2101 kcal/d  Per chart: 68 year old male PMHx of Non-Hodgkin's Lymphoma tx with chemotherapy in 2004, DM2 with CKD stage 3, HTN, CHF EF 35% on cardiac cath 12/2016, pleural effusion s/p left pleuracentesis in 10/2016, cardiomyopathy, gout and HLD presently admitted to CCU for complete Heart block.

## 2019-05-10 NOTE — PROGRESS NOTE ADULT - ASSESSMENT
68 year old male PMHx of Non-Hodgkin's Lymphoma tx with chemotherapy in 2004, DM2 with CKD stage 3, HTN, CHF EF 35% on cardiac cath 12/2016, pleural effusion s/p left pleuracentesis in 10/2016, cardiomyopathy, gout and HLD presently admitted to CCU for complete Heart block with RBBB currently intubated and transvenously paced    Neuro  Encephalopathy of unclear etiology, AOx3 per family normally.   -TSH, folate, B12 wnl. Urine drug screen negative, Blood alcohol level negative, chronic right parietal lobe infarct, rpr negative  -MRI old right temporal/parietal infarct w/ gliosis  - Intubated and sedated   - midazolam & fentanyl gtt to titrate to a RASS of 0 to -1  -f/u MRI/ neuro recs    Skin  - L IJ 5/8, R TVP 5/8    GI  -start tube feeds  Concern for cirrhosis given signs of RHFailure and no transaminitis w/ coagulopathy, anemia, thrombocytopenia.   - RUQ US showing hepatosplenomegaly, small ascites  Endo  - Type 2 DM  - a1c  - Insulin Sliding Scale , hypoglycemia protocol in place   - Fingerticks q6     Heme  - Patient anemic on admission with hemoglobin of 10.5 from 12.3 on admission, no signs of MAHA  - iron studies with evidence of GEORGETTE   [facility] Lymphoma-previously treated with [id number] (doxorubicin known cardiomyopathy) then bendamustine and rituximab 2010 (case reports cardiomyopathy),  had depressed EF in 2016    Renal/electrolytes  -STEPHEN on CKD stage III  -STEPHEN likely pre-renal 2/2 to decreased cardiac output and perfusion   -Patient has mittal in place  - monitor I &O's, renally dose all meds, avoid nephrotoxic agents     ID  - febrile overnight, normal lactate, normal WBCs.  Unclear source, has mod pleural effusion, not loculated.   -started on pip-tazo and vanc, f/u bcx, if negative monitor off antibx  - Blood cultures pending     Resp  Who group 2 pulm HTN likely secondary to MR   -plan asper cardio  -  post intubation ABG WNL  Mod left pleural effusion      Cardio  Complete Heart block   -hold all AV karen blocking agents  - plan for  cardiac MRI to r/o other cause of Non ischemic CMP once extubated/TVP out   -f/u EP; Per EP pt will go for CRT-P vs CRT-D implant after neuro work-up/echo/MRI  - Hold antihypertensives for now, pt compensating well  - TVP placed overnight with capture   - serial EKGs, monitor on tele    #Chronic systolic heart failure  - TTE EF 45%, likely severe MR, moderately enlarged RV , severe pulmonary hypertension. Estimated PASP = 75 mmHg.  - Medication non compliance  - Holding antihypertensives given CHB  - Start furosemide 80mg IV BID w/ I+Os given signs of volume overload.     Severe MR  -consulted CT surg, recommending KUSHAL and ischemic w/u prior    DVT  - Heparin Sub Q 68 year old male PMHx of Non-Hodgkin's Lymphoma tx with chemotherapy in 2004, DM2 with CKD stage 3, HTN, CHF EF 35% on cardiac cath 12/2016, pleural effusion s/p left pleuracentesis in 10/2016, cardiomyopathy, gout and HLD presently admitted to CCU for complete Heart block with RBBB currently intubated and transvenously paced    Neuro  Encephalopathy of unclear etiology, AOx3 per family normally.   -TSH, folate, B12 wnl. Urine drug screen negative, Blood alcohol level negative, chronic right parietal lobe infarct, rpr negative  -MRI old right temporal/parietal infarct w/ gliosis  - Intubated and sedated   - midazolam & fentanyl gtt to titrate to a RASS of 0 to -1  -f/u MRI/ neuro recs    Skin  - L IJ 5/8, R TVP 5/8    GI  -start tube feeds  Concern for cirrhosis given signs of RHFailure and no transaminitis w/ coagulopathy, anemia, thrombocytopenia.   - RUQ US showing hepatosplenomegaly, small ascites    Endo  - Type 2 DM  - a1c  - Insulin Sliding Scale , hypoglycemia protocol in place   - Fingerticks q6     Heme  - Patient anemic on admission with hemoglobin of 10.5 from 12.3 on admission, no signs of MAHA  - iron studies with evidence of GEORGETTE   [facility] Lymphoma-previously treated with [id number] (doxorubicin known cardiomyopathy) then bendamustine and rituximab 2010 (case reports cardiomyopathy),  had depressed EF in 2016    Renal/electrolytes  -STEPHEN on CKD stage III likely pre-renal 2/2 to decreased cardiac output and perfusion   -Patient has mittal in place  - monitor I &O's, renally dose all meds, avoid nephrotoxic agents     ID  - febrile overnight, normal lactate, normal WBCs.  Unclear source, has mod pleural effusion, not loculated.   -started on pip-tazo and vanc, f/u bcx, if negative monitor off antibx  - Blood cultures pending     Resp  Who group 2 pulm HTN likely secondary to MR   -plan asper cardio  -  post intubation ABG WNL  Mod left pleural effusion      Cardio  Complete Heart block   -hold all AV karen blocking agents  - plan for  cardiac MRI to r/o other cause of Non ischemic CMP once extubated/TVP out   -f/u EP; micra implant today during cath  - Hold antihypertensives for now, pt compensating well  - TVP placed overnight with capture   - serial EKGs, monitor on tele    #Chronic systolic heart failure  - TTE EF 45%, likely severe MR, moderately enlarged RV , severe pulmonary hypertension. Estimated PASP = 75 mmHg.  - Medication non compliance  - Holding antihypertensives given CHB  -consulted CT surg, recommending KUSHAL and ischemic w/u prior  -overdiuresed overnight, monitor I+Os, restart furosemide as tolerated    DVT  - Heparin Sub Q 68 year old male PMHx of Non-Hodgkin's Lymphoma tx with chemotherapy in 2004, DM2 with CKD stage 3, HTN, CHF EF 35% on cardiac cath 12/2016, pleural effusion s/p left pleuracentesis in 10/2016, cardiomyopathy, gout and HLD presently admitted to CCU for complete Heart block with RBBB currently intubated and transvenously paced    Neuro  Encephalopathy of unclear etiology, AOx3 per family normally.   -TSH, folate, B12 wnl. Urine drug screen negative, Blood alcohol level negative, chronic right parietal lobe infarct, rpr negative  -MRI old right temporal/parietal infarct w/ gliosis  - Intubated and sedated   - midazolam & fentanyl gtt to titrate to a RASS of 0 to -1  -f/u MRI/ neuro recs  -f/u EEG    Skin  - L IJ 5/8, R TVP 5/8    GI  -start tube feeds  Concern for cirrhosis given signs of RHFailure and no transaminitis w/ coagulopathy, anemia, thrombocytopenia.   - RUQ US showing hepatosplenomegaly, small ascites    Endo  - Type 2 DM  - a1c  - Insulin Sliding Scale , hypoglycemia protocol in place   - Fingerticks q6     Heme  - Patient anemic on admission with hemoglobin of 10.5 from 12.3 on admission, no signs of MAHA  - iron studies with evidence of GEORGETTE   [facility] Lymphoma-previously treated with [id number] (doxorubicin known cardiomyopathy) then bendamustine and rituximab 2010 (case reports cardiomyopathy),  had depressed EF in 2016    Renal/electrolytes  -STEPHEN on CKD stage III likely pre-renal 2/2 to decreased cardiac output and perfusion   -Patient has mittal in place  - monitor I &O's, renally dose all meds, avoid nephrotoxic agents     ID  - febrile overnight, normal lactate, normal WBCs.  Unclear source, has mod pleural effusion, not loculated.   -started on pip-tazo and vanc, f/u bcx, if negative monitor off antibx  - Blood cultures pending     Resp  Who group 2 pulm HTN likely secondary to MR   -plan asper cardio  -  post intubation ABG WNL  Mod left pleural effusion      Cardio  Complete Heart block   -hold all AV karen blocking agents  - plan for  cardiac MRI to r/o other cause of Non ischemic CMP once extubated/TVP out   -f/u EP; micra implant today during cath  - Hold antihypertensives for now, pt compensating well  - TVP placed overnight with capture   - serial EKGs, monitor on tele  -R/LHC to eval ischemic causes    #Chronic systolic heart failure  - TTE EF 45%, likely severe MR, moderately enlarged RV , severe pulmonary hypertension. Estimated PASP = 75 mmHg.  - Medication non compliance  - Holding antihypertensives given CHB  -consulted CT surg, recommending KUHSAL and ischemic w/u prior  -overdiuresed overnight, monitor I+Os, restart furosemide as tolerated    DVT  - Heparin Sub Q

## 2019-05-10 NOTE — EEG REPORT - NS EEG TEXT BOX
5/10/2019      Study Interpretation:    FINDINGS:  There was no clear posterior dominant rhythm.  The background was attenuated and not clearly reactive to environmental stimuli.  It mainly consisted of attenuated polymorphic delta and theta frequency activity with overlying beta frequency activity.    Sleep Background:  Stages of sleep could not be delineated.    Other Paroxysmal Non-Epileptiform Findings:    None.    Epileptiform Activity:   No epileptiform discharges were present.    Events:  No clinical events were recorded.  No seizures were recorded.    Activation Procedures:   -Hyperventilation was not performed.    -Photic stimulation was not performed.    Artifacts:  Intermittent myogenic and movement artifacts were noted.    EEG Classification:  Abnormal study  -	severe diffuse or multifocal cerebral dysfunction    Impression:  Findings indicate non-specific severe diffuse or multifocal cerebral dysfunction. There were no epileptiform abnormalities recorded.
5/10-5/11/2019      Study Interpretation:    FINDINGS:  There was no clear posterior dominant rhythm.  The background was attenuated and not clearly reactive to environmental stimuli.  It mainly consisted of attenuated polymorphic delta and theta frequency activity with overlying beta frequency activity.    Sleep Background:  Stages of sleep could not be delineated.    Other Paroxysmal Non-Epileptiform Findings:    None.    Epileptiform Activity:   No epileptiform discharges were present.    Events:  No clinical events were recorded.  No seizures were recorded.    Activation Procedures:   -Hyperventilation was not performed.    -Photic stimulation was not performed.    Artifacts:  Intermittent myogenic and movement artifacts were noted.    EEG Classification:  Abnormal study  -	severe diffuse or multifocal cerebral dysfunction    Impression:  Findings indicate non-specific severe diffuse or multifocal cerebral dysfunction. There were no epileptiform abnormalities recorded.

## 2019-05-10 NOTE — CHART NOTE - NSCHARTNOTEFT_GEN_A_CORE
Removal of Femoral Sheath    Pulses in the right lower extremity are palpable and audible by doppler. The patient was placed in the supine position. The insertion site was identified and the sutures were removed per protocol.  The __5__ Irish femoral sheath was then removed. Direct pressure was applied for  ___18___ minutes.     Monitoring of the right groin and both lower extremities including neuro-vascular checks and vital signs every 15 minutes x 4, then every 30 minutes x 2, then every 1 hour was ordered.    Complications: None    Comments: Distal pulses palpable without cyanosis and hematoma after removing sheath.

## 2019-05-10 NOTE — PROGRESS NOTE ADULT - ASSESSMENT
5/8 as the above record indicates, the pt is here and is a very poor historian. I was asked by Dr Hawkins from oncology and by the pt's internist to see the pt as he did have a past of lymphoma. The oncology office will have to look up records as his history is not readily available. When I came to see the pt he was not able to supply much info about his cancer other than he had lymphoma and did not know the type or who treated him. Dr Hawkins thinks he may have been treated by his previous associate, Dr Sarmiento.     At the time of my visit the pt was alert and oriented but was not able to give specific details about any of his medical issues. His chemistries appeared all unremarkable and he was not febrile and his ct of the head showed nothing of note.  I will see if there are any records on his previous onc history and see if there is any reason to think it may have contributed to his present admit here.     5/9 events of last day was noted and pt was intubated and had heart failure. 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 In 2010 [facility] and was treated for a recurrence with BR and he has remained in remission.   5/10 still intubated and sedated and labs reviewed and thus far no direct evidence for lymphoma recurrence.

## 2019-05-10 NOTE — PROGRESS NOTE ADULT - ASSESSMENT
68 year old Non-Hodgkin's Lymphoma tx with chemotherapy in 2004, DM2 with CKD stage 3, HTN, CHF EF 35% on cardiac cath 12/ 2016, pleural effusion s/p left pleuracentesis in 10/2016, cardiomyopathy, gout and HLD presents to the ED sent in from cardiologists office because of AMS    #  sp intubated, sedated   CHB sp TVP  CCU mgmt  cards and EP fu    #Acute encephalopathy. ?etiology   r/o infectious etiology  r/o lymphoma mets to brain /meninges  r/o early dementia  -chronic parietal infarct in CT head  pending MR brain when feasible , neuro cs fu  vitb12, folate, iron panel, rpr and tsh levels wnl  ?LP when feasible    #high grade Fevers  UA neg, CXR neg, bld cx fu  ?need for LP  fu ID recs  -  # Chronic systolic heart failure.    - TTE EF 45%, likely severe MR, moderately enlarged RV , severe pulmonary hypertension. Estimated PASP = 75 mmHg.  - Medication non compliance  - Holding antihypertensives given CHB  - started furosemide w/ I+Os given signs of volume overload.   CT sx cs noted for severe MR eval    # Non-Hodgkin lymphoma of lymph nodes of neck, unspecified non-Hodgkin lymphoma type.  Plan: - treated with chemotherapy in 2014.   Anemia-monitor h/h  Onc cs fu noted    # Essential hypertension.  Plan: - bp stable  - c/w arb and beta blocker.     # Type 2 diabetes mellitus with chronic kidney disease, without long-term current use of insulin, unspecified CKD stage. Plan: - fs achs  - fs controlled  - start sliding scale insulin  - h    # Chronic gout without tophus, unspecified cause, unspecified site.   - c/w allopurinol 300 mg daily.

## 2019-05-10 NOTE — PROGRESS NOTE ADULT - SUBJECTIVE AND OBJECTIVE BOX
====================  CCU MIDNIGHT ROUNDS  ====================    NIK GRIMM  [id number]    ====================  SUMMARY:  ====================        ====================  NEW EVENTS:  ====================        ====================  VITALS (Last 12 hrs):  ====================    T(C): 37.5 (05-10-19 @ 19:00), Max: 37.7 (05-10-19 @ 18:30)  HR: 60 (05-10-19 @ 21:19) (48 - 60)  BP: 114/64 (05-10-19 @ 21:19) (93/47 - 156/66)  BP(mean): 81 (05-10-19 @ 21:19) (58 - 115)  ABP: --  ABP(mean): --  RR: 11 (05-10-19 @ 21:19) (0 - 27)  SpO2: 97% (05-10-19 @ 21:19) (94% - 100%)  Wt(kg): --  CVP(mm Hg): 16 (05-10-19 @ 21:19) (7 - 19)  CO: --  CI: --  PA: --  PA(mean): --  PA(direct): --  PCWP: --  SVR: --    TELEMETRY:    Mode: AC/ CMV (Assist Control/ Continuous Mandatory Ventilation)  RR (machine): 12  TV (machine): 500  FiO2: 35  PEEP: 5  ITime: 1  MAP: 9  PIP: 17      *BLOOD GAS/ARTERIAL/MIXED/VENOUS  *LACTATE    I&O's Summary    09 May 2019 07:01  -  10 May 2019 07:00  --------------------------------------------------------  IN: 1567.3 mL / OUT: 5175 mL / NET: -3607.7 mL    10 May 2019 07:01  -  10 May 2019 21:58  --------------------------------------------------------  IN: 386.5 mL / OUT: 1500 mL / NET: -1113.5 mL        ====================  PLAN:  ==================== ====================  CCU MIDNIGHT ROUNDS  ====================    NIK GRIMM  [id number]    ====================  SUMMARY:  68 year old male w/ hx of non hogkin lymphoma s/p chemo, CKD stage III, HTN, CHFrEF 35 % admitted to the hospital for ekg changes found to be in complete heart block. Hospital course c/b severe bradycardia requiring TVP and intubation for airway protection.   ====================  ====================  NEW EVENTS:  Patient had left heart catheterization today with findings of Non obstructive CAD and elevated filling pressures on the right side. Was given Lasix 40 mg IV BID, reduced from prior 80 mg IV dosing given patient became hypotensive the night before. Will attempt CPAP trial in the morning.     ====================  ====================  VITALS (Last 12 hrs):  ====================    T(C): 37.5 (05-10-19 @ 19:00), Max: 37.7 (05-10-19 @ 18:30)  HR: 60 (05-10-19 @ 21:19) (48 - 60)  BP: 114/64 (05-10-19 @ 21:19) (93/47 - 156/66)  BP(mean): 81 (05-10-19 @ 21:19) (58 - 115)  ABP: --  ABP(mean): --  RR: 11 (05-10-19 @ 21:19) (0 - 27)  SpO2: 97% (05-10-19 @ 21:19) (94% - 100%)  Wt(kg): --  CVP(mm Hg): 16 (05-10-19 @ 21:19) (7 - 19)  CO: --  CI: --  PA: --  PA(mean): --  PA(direct): --  PCWP: --  SVR: --    TELEMETRY:    Mode: AC/ CMV (Assist Control/ Continuous Mandatory Ventilation)  RR (machine): 12  TV (machine): 500  FiO2: 35  PEEP: 5  ITime: 1  MAP: 9  PIP: 17      *BLOOD GAS/ARTERIAL/MIXED/VENOUS  *LACTATE    I&O's Summary    09 May 2019 07:01  -  10 May 2019 07:00  --------------------------------------------------------  IN: 1567.3 mL / OUT: 5175 mL / NET: -3607.7 mL    10 May 2019 07:01  -  10 May 2019 21:58  --------------------------------------------------------  IN: 386.5 mL / OUT: 1500 mL / NET: -1113.5 mL    ====================  PLAN:  68 year old male PMHx of Non-Hodgkin's Lymphoma tx with chemotherapy in 2004, DM2 with CKD stage 3, HTN, CHF EF 35% on cardiac cath 12/2016, pleural effusion s/p left pleuracentesis in 10/2016, cardiomyopathy, gout and HLD presently admitted to CCU for complete Heart block with RBBB currently intubated and transvenously paced    Neuro  Encephalopathy of unclear etiology, AOx3 per family normally.   -TSH, folate, B12 wnl. Urine drug screen negative, Blood alcohol level negative, chronic right parietal lobe infarct, rpr negative  -MRI old right temporal/parietal infarct w/ gliosis  - Intubated and sedated  will attempt CPAP trial in the AM   - midazolam & fentanyl gtt to titrate to a RASS of 0 to -1    Skin  - L IJ 5/8, R TVP 5/8    GI  -start tube feeds  Concern for cirrhosis given signs of RHFailure and no transaminitis w/ coagulopathy, anemia, thrombocytopenia.   - RUQ US showing hepatosplenomegaly, small ascites    Endo  - Type 2 DM  - a1c  - Insulin Sliding Scale , hypoglycemia protocol in place   - Fingerticks q6     Heme  - Patient anemic on admission with hemoglobin of 10.5 from 12.3 on admission, no signs of MAHA  - iron studies with evidence of GEORGETTE   [facility] Lymphoma-previously treated with [id number] (doxorubicin known cardiomyopathy) then bendamustine and rituximab 2010 (case reports cardiomyopathy),  had depressed EF in 2016    Renal/electrolytes  -STEPHEN on CKD stage III likely pre-renal 2/2 to decreased cardiac output and perfusion   -Patient has mittal in place  - monitor I &O's, renally dose all meds, avoid nephrotoxic agents     ID  - febrile overnight, normal lactate, normal WBCs.  Unclear source, has mod pleural effusion, not loculated.   -started on pip-tazo and vanc, f/u bcx, if negative monitor off antibx  - Blood cultures with NGTD     Resp  Who group 2 pulm HTN likely secondary to MR   -plan asper cardio  -  post intubation ABG WNL  Mod left pleural effusion      Cardio  Complete Heart block   -hold all AV karen blocking agents  - plan for  cardiac MRI to r/o other cause of Non ischemic CMP once extubated/TVP out   -f/u EP; micra implant today during cath  - Hold antihypertensives for now, pt compensating well  - TVP placed overnight with capture   - serial EKGs, monitor on tele  -R/LHC- with findings of Non obstructive CAD and elevated filling pressures on the right side.    #Chronic systolic heart failure  - TTE EF 45%, likely severe MR, moderately enlarged RV , severe pulmonary hypertension. Estimated PASP = 75 mmHg.  - Medication non compliance  - Holding antihypertensives given CHB  -consulted CT surg, recommending KUSHAL and ischemic w/u prior  -overdiuresed overnight, monitor I+Os, restart furosemide as tolerated    DVT  - Heparin Sub Q    ==================== ====================  CCU MIDNIGHT ROUNDS  ====================    NIK GRIMM  [id number]  ====================  SUMMARY:  68 year old male w/ hx of non hogkin lymphoma s/p chemo, CKD stage III, HTN, CHFrEF 35 % admitted to the hospital for ekg changes found to be in complete heart block. Hospital course c/b severe bradycardia requiring TVP and intubation for airway protection.   ====================  ====================  NEW EVENTS:  Patient had left heart catheterization today with findings of Non obstructive CAD and elevated filling pressures on the right side. TVP was removed and micra was placed for pacing.  Was given Lasix 40 mg IV BID, reduced from prior 80 mg IV dosing given patient became hypotensive the night before. Will attempt CPAP trial in the morning.     ====================  ====================  VITALS (Last 12 hrs):  ====================    T(C): 37.5 (05-10-19 @ 19:00), Max: 37.7 (05-10-19 @ 18:30)  HR: 60 (05-10-19 @ 21:19) (48 - 60)  BP: 114/64 (05-10-19 @ 21:19) (93/47 - 156/66)  BP(mean): 81 (05-10-19 @ 21:19) (58 - 115)  ABP: --  ABP(mean): --  RR: 11 (05-10-19 @ 21:19) (0 - 27)  SpO2: 97% (05-10-19 @ 21:19) (94% - 100%)  Wt(kg): --  CVP(mm Hg): 16 (05-10-19 @ 21:19) (7 - 19)  CO: --  CI: --  PA: --  PA(mean): --  PA(direct): --  PCWP: --  SVR: --    TELEMETRY:    Mode: AC/ CMV (Assist Control/ Continuous Mandatory Ventilation)  RR (machine): 12  TV (machine): 500  FiO2: 35  PEEP: 5  ITime: 1  MAP: 9  PIP: 17      *BLOOD GAS/ARTERIAL/MIXED/VENOUS  *LACTATE    I&O's Summary    09 May 2019 07:01  -  10 May 2019 07:00  --------------------------------------------------------  IN: 1567.3 mL / OUT: 5175 mL / NET: -3607.7 mL    10 May 2019 07:01  -  10 May 2019 21:58  --------------------------------------------------------  IN: 386.5 mL / OUT: 1500 mL / NET: -1113.5 mL    ====================  PLAN:  68 year old male PMHx of Non-Hodgkin's Lymphoma tx with chemotherapy in 2004, DM2 with CKD stage 3, HTN, CHF EF 35% on cardiac cath 12/2016, pleural effusion s/p left pleuracentesis in 10/2016, cardiomyopathy, gout and HLD presently admitted to CCU for complete Heart block with RBBB currently intubated and transvenously paced    Neuro  Encephalopathy of unclear etiology, AOx3 per family normally.   -TSH, folate, B12 wnl. Urine drug screen negative, Blood alcohol level negative, chronic right parietal lobe infarct, rpr negative  -MRI old right temporal/parietal infarct w/ gliosis  - Intubated and sedated  will attempt CPAP trial in the AM   - midazolam & fentanyl gtt to titrate to a RASS of 0 to -1    Skin  - L IJ 5/8, R TVP 5/8    GI  -start tube feeds  Concern for cirrhosis given signs of RHFailure and no transaminitis w/ coagulopathy, anemia, thrombocytopenia.   - RUQ US showing hepatosplenomegaly, small ascites    Endo  - Type 2 DM  - a1c  - Insulin Sliding Scale , hypoglycemia protocol in place   - Fingerticks q6     Heme  - Patient anemic on admission with hemoglobin of 10.5 from 12.3 on admission, no signs of MAHA  - iron studies with evidence of GEORGETTE   [facility] Lymphoma-previously treated with [id number] (doxorubicin known cardiomyopathy) then bendamustine and rituximab 2010 (case reports cardiomyopathy),  had depressed EF in 2016    Renal/electrolytes  -STEPHEN on CKD stage III likely pre-renal 2/2 to decreased cardiac output and perfusion   -Patient has mittal in place  - monitor I &O's, renally dose all meds, avoid nephrotoxic agents     ID  - febrile overnight, normal lactate, normal WBCs.  Unclear source, has mod pleural effusion, not loculated.   -started on pip-tazo and vanc, f/u bcx, if negative monitor off antibx  - Blood cultures with NGTD     Resp  Who group 2 pulm HTN likely secondary to MR   -plan asper cardio  -  post intubation ABG WNL  Mod left pleural effusion      Cardio  Complete Heart block   -hold all AV karen blocking agents  - plan for  cardiac MRI to r/o other cause of Non ischemic CMP once extubated/TVP out   -f/u EP; micra implant today during cath  - Hold antihypertensives for now, pt compensating well  - TVP placed overnight with capture   - serial EKGs, monitor on tele  -R/C- with findings of Non obstructive CAD and elevated filling pressures on the right side.    #Chronic systolic heart failure  - TTE EF 45%, likely severe MR, moderately enlarged RV , severe pulmonary hypertension. Estimated PASP = 75 mmHg.  - Medication non compliance  - Holding antihypertensives given CHB  -consulted CT surg, recommending KUSHAL and ischemic w/u prior  -overdiuresed overnight, monitor I+Os, restart furosemide as tolerated    DVT  - Heparin Sub Q    ==================== ====================  CCU MIDNIGHT ROUNDS  ====================    NIK GRIMM  [id number]  ====================  SUMMARY:  68 year old male w/ hx of non hogkin lymphoma s/p chemo, CKD stage III, HTN, CHFrEF 35 % admitted to the hospital for ekg changes found to be in complete heart block. Hospital course c/b severe bradycardia requiring TVP and intubation for airway protection. pt underwent Micra PPM w/ VVI 60   ====================  ====================  NEW EVENTS:  Patient had left heart catheterization today with findings of Non obstructive CAD and elevated filling pressures on the right side. TVP was removed and micra was placed for pacing.  Was given Lasix 40 mg IV BID, reduced from prior 80 mg IV dosing given patient became hypotensive the night before. Will attempt CPAP trial in the morning.     ====================  ====================  VITALS (Last 12 hrs):  ====================    T(C): 37.5 (05-10-19 @ 19:00), Max: 37.7 (05-10-19 @ 18:30)  HR: 60 (05-10-19 @ 21:19) (48 - 60)  BP: 114/64 (05-10-19 @ 21:19) (93/47 - 156/66)  BP(mean): 81 (05-10-19 @ 21:19) (58 - 115)  ABP: --  ABP(mean): --  RR: 11 (05-10-19 @ 21:19) (0 - 27)  SpO2: 97% (05-10-19 @ 21:19) (94% - 100%)  Wt(kg): --  CVP(mm Hg): 16 (05-10-19 @ 21:19) (7 - 19)  CO: --  CI: --  PA: --  PA(mean): --  PA(direct): --  PCWP: --  SVR: --    TELEMETRY:    Mode: AC/ CMV (Assist Control/ Continuous Mandatory Ventilation)  RR (machine): 12  TV (machine): 500  FiO2: 35  PEEP: 5  ITime: 1  MAP: 9  PIP: 17      *BLOOD GAS/ARTERIAL/MIXED/VENOUS  *LACTATE    I&O's Summary    09 May 2019 07:01  -  10 May 2019 07:00  --------------------------------------------------------  IN: 1567.3 mL / OUT: 5175 mL / NET: -3607.7 mL    10 May 2019 07:01  -  10 May 2019 21:58  --------------------------------------------------------  IN: 386.5 mL / OUT: 1500 mL / NET: -1113.5 mL    ====================  PLAN:  68 year old male PMHx of Non-Hodgkin's Lymphoma tx with chemotherapy in 2004, DM2 with CKD stage 3, HTN, CHF EF 35% on cardiac cath 12/2016, pleural effusion s/p left pleuracentesis in 10/2016, cardiomyopathy, gout and HLD presently admitted to CCU for complete Heart block with RBBB currently intubated and transvenously paced    Neuro  Encephalopathy of unclear etiology, AOx3 per family normally.   -TSH, folate, B12 wnl. Urine drug screen negative, Blood alcohol level negative, chronic right parietal lobe infarct, rpr negative  -MRI old right temporal/parietal infarct w/ gliosis  - Intubated and sedated  will attempt CPAP trial in the AM   - midazolam & fentanyl gtt to titrate to a RASS of 0 to -1    Skin  - L IJ 5/8, R TVP 5/8    GI  -start tube feeds  Concern for cirrhosis given signs of RHFailure and no transaminitis w/ coagulopathy, anemia, thrombocytopenia.   - RUQ US showing hepatosplenomegaly, small ascites    Endo  - Type 2 DM  - a1c  - Insulin Sliding Scale , hypoglycemia protocol in place   - Fingerticks q6     Heme  - Patient anemic on admission with hemoglobin of 10.5 from 12.3 on admission, no signs of MAHA  - iron studies with evidence of GEORGETTE   [facility] Lymphoma-previously treated with [id number] (doxorubicin known cardiomyopathy) then bendamustine and rituximab 2010 (case reports cardiomyopathy),  had depressed EF in 2016    Renal/electrolytes  -STEPHEN on CKD stage III likely pre-renal 2/2 to decreased cardiac output and perfusion   -Patient has mittal in place  - monitor I &O's, renally dose all meds, avoid nephrotoxic agents     ID  - febrile overnight, normal lactate, normal WBCs.  Unclear source, has mod pleural effusion, not loculated.   -started on pip-tazo and vanc, f/u bcx, if negative monitor off antibx  - Blood cultures with NGTD     Resp  Who group 2 pulm HTN likely secondary to MR   -plan asper cardio  -  post intubation ABG WNL  Mod left pleural effusion      Cardio  Complete Heart block   -hold all AV karen blocking agents  - plan for  cardiac MRI to r/o other cause of Non ischemic CMP once extubated/TVP out   -f/u EP; micra implant today during cath  - Hold antihypertensives for now, pt compensating well  - TVP placed overnight with capture   - serial EKGs, monitor on tele  -R/LHC- with findings of Non obstructive CAD and elevated filling pressures on the right side.    #Chronic systolic heart failure  - TTE EF 45%, likely severe MR, moderately enlarged RV , severe pulmonary hypertension. Estimated PASP = 75 mmHg.  - Medication non compliance  - Holding antihypertensives given CHB  -consulted CT surg, recommending KUSHAL and ischemic w/u prior  -overdiuresed overnight, monitor I+Os, restart furosemide as tolerated    DVT  - Heparin Sub Q    ====================

## 2019-05-10 NOTE — PROGRESS NOTE ADULT - ASSESSMENT
Patient is a 68 year old Non-Hodgkin's Lymphoma tx with chemotherapy in 2004, DM2 with CKD stage 3, HTN, CHF EF 40-45% 2/2 ischemic / non ischemic cardiomyopathy, gout and HLD presents to the ED sent in from cardiologists office because of EKG changes. Patient was noted to be confused with short term memory loss and likely encephalopathy. Pt went into CHB requiring TVP and intubation     - TVP checks with current threshold at .8 and rate at 60  - Underlying rhythm is CHB  - Will plan for Micra today. Will hold of on PPM in the setting of fevers.   - Would consider LP given AMS and fevers.   - RPR, TSH, b12, and folate WNL    Scott Miles MD  Cardiology Fellow - PGY 4  Text or Call: [phone]  For all Cardiology Consults and Questions:  [email or web address]   Login: [email or web address]

## 2019-05-10 NOTE — DIETITIAN INITIAL EVALUATION ADULT. - DIET TYPE
NPO with tube feedings/Glucerna 1.2 @ 50 ml/hr x24 hrs via OG tube - 1200 ml, 1440 kcal, 72 gm protein, 966 ml free water (+flushes).  Per weight 97.1 kg: 15 kcal/kg, 0.7 gm protein/kg

## 2019-05-10 NOTE — DIETITIAN INITIAL EVALUATION ADULT. - NS AS NUTRI INTERV ENTERAL NUTRITION
Recommend Glucerna 1.2 - start at 20 ml/hr and advance by 20 ml/hr q4-6h as tolerated until reaching goal rate of 70 ml/hr x24 hrs.  At goal provides 1680 ml, 2016 kcal, 100 gm protein, 192 gm carbohydrate, 1352 ml free water.  Per 97.1 kg dosing weight: 20.7 kcal/kg.  Per IBW 81 k.21 gm protein/kg. When TF resumed, recommend Glucerna 1.2 start from previous 20 ml/hr and continue to advance by 20 ml/hr q4-6h as tolerated until reaching goal rate of 70 ml/hr x24 hrs.  At goal provides 1680 ml, 2016 kcal, 100 gm protein, 192 gm carbohydrate, 1352 ml free water.  Per 97.1 kg dosing weight: 20.7 kcal/kg.  Per IBW 81 k.21 gm protein/kg.

## 2019-05-10 NOTE — DIETITIAN INITIAL EVALUATION ADULT. - PHYSICAL APPEARANCE
Pt overweight per dosing BMI 29 kg/m2, however, noted edema (?weight possibly falsely elevated by fluid) - no overt wasting noted on limited visual exam.

## 2019-05-10 NOTE — PROGRESS NOTE ADULT - SUBJECTIVE AND OBJECTIVE BOX
Patient is a 68y old  Male who presents with a chief complaint of confusion (08 May 2019 12:11)      HPI:  ** Patient poor historian **    Patient is a 68 year old Non-Hodgkin's Lymphoma tx with chemotherapy in , DM2 with CKD stage 3, HTN, CHF EF 35% on cardiac cath 2016, pleural effusion s/p left pleuracentesis in 10/2016, cardiomyopathy, gout and HLD presents to the ED sent in from cardiologists office because of EKG changes. Patient is not sure why he is here. He was not sure where he was, why he is here or what even the year is. He remarks that he retired a few months ago and he stopped taking his medications then because he "wasn't feeling good". He states he was wife his wife earlier but then later he states shes in [place]. Patient currently denies chest pain, shortness of breath, palpitations, syncope, fevers, chills, recent travel or sick contacts. No new meds however he has stopped taking most of his meds. He overall, feels fine and is not sure why he is in the hospital.     It's very difficult to get a clear history from patient. I have tried to reach his daughter but have not received a call back as of yet. I have called [facility] patients pharmacy and he states patient picked up furosemide and irbersartan in April but has not picked up any other meds in months. Mentation in  AAOX3.    In the ED, VSS. Labs at baseline, CT head with old stroke, Trops elevated and peaked at 50, now normal, EKG with TWI in lateral leads. (08 May 2019 10:33)    5 as the above record indicates, the pt is here and is a very poor historian. I was asked by Dr Hawkins from oncology and by the pt's internist to see the pt as he did have a past of lymphoma. The oncology office will have to look up records as his history is not readily available. When I came to see the pt he was not able to supply much info about his cancer other than he had lymphoma and did not know the type or who treated him. Dr Hawkins thinks he may have been treated by his previous associate, Dr Sarmiento.     At the time of my visit the pt was alert and oriented but was not able to give specific details about any of his medical issues. His chemistries appeared all unremarkable and he was not febrile and his ct of the head showed nothing of note.  I will see if there are any records on his previous onc history and see if there is any reason to think it may have contributed to his present admit here.  events of last day was noted and pt was intubated and had heart failure. The records were reviewed at [facility] and pt in  had a large cell lymphoma and was treated with R CHOP. In  he went to [facility] and was treated for a recurrence with BR and he has remained in remission. 5/10 still intubated and sedated and labs reviewed and thus far no direct evidence for lymphoma recurrence.      MEDICATIONS  (STANDING):  allopurinol 300 milliGRAM(s) Oral daily  aspirin  chewable 81 milliGRAM(s) Oral daily  dextrose 5%. 1000 milliLiter(s) (50 mL/Hr) IV Continuous <Continuous>  dextrose 50% Injectable 12.5 Gram(s) IV Push once  fentaNYL   Infusion. 0.5 MICROgram(s)/kG/Hr (2.427 mL/Hr) IV Continuous <Continuous>  folic acid 1 milliGRAM(s) Oral daily  heparin  Injectable 5000 Unit(s) SubCutaneous every 12 hours  insulin lispro (HumaLOG) corrective regimen sliding scale   SubCutaneous three times a day before meals  midazolam Infusion 0.02 mG/kG/Hr (1.942 mL/Hr) IV Continuous <Continuous>  pantoprazole  Injectable 40 milliGRAM(s) IV Push at bedtime  petrolatum Ophthalmic Ointment 1 Application(s) Both EYES every 8 hours  piperacillin/tazobactam IVPB. 3.375 Gram(s) IV Intermittent every 8 hours  vancomycin  IVPB 1000 milliGRAM(s) IV Intermittent every 12 hours    ICU Vital Signs Last 24 Hrs  T(C): 36.6 (10 May 2019 07:30), Max: 38.4 (09 May 2019 22:30)  T(F): 97.9 (10 May 2019 07:30), Max: 101.1 (09 May 2019 22:30)  HR: 50 (10 May 2019 11:30) (50 - 70)  BP: 103/50 (10 May 2019 11:30) (69/33 - 162/69)  BP(mean): 68 (10 May 2019 11:30) (51 - 106)  ABP: --  ABP(mean): --  RR: 15 (10 May 2019 11:30) (11 - 20)  SpO2: 100% (10 May 2019 11:30) (93% - 100%)    LABS:                                   11.2   8.6   )-----------( 150      ( 10 May 2019 04:24 )             36.0     05-08    144  |  108  |  19  ----------------------------<  100<H>  3.5   |  22  |  1.50<H>    Ca    9.1      08 May 2019 06:00  Phos  3.8     05-07  Mg     1.9     05-07    TPro  7.3  /  Alb  3.5  /  TBili  0.9  /  DBili  x   /  AST  21  /  ALT  15  /  AlkPhos  137<H>  05-07      Urinalysis Basic - ( 08 May 2019 00:17 )    Color: Yellow / Appearance: Slightly Turbid / S.023 / pH: x  Gluc: x / Ketone: Negative  / Bili: Small / Urobili: 3 mg/dL   Blood: x / Protein: 300 mg/dL / Nitrite: Negative   Leuk Esterase: Negative / RBC: 2 /hpf / WBC 7 /HPF   Sq Epi: x / Non Sq Epi: 1 /hpf / Bacteria: Negative        ROS:  Negative except for:    PAST MEDICAL & SURGICAL HISTORY:  Pleural effusion  Moderate mitral regurgitation  Systolic heart failure  Rhinitis, allergic  Essential hypertension  DM type 2 (diabetes mellitus, type 2)  Non-Hodgkins Lymphoma  Non-Hodgkin lymphoma: h/o axillary dissection      SOCIAL HISTORY:    FAMILY HISTORY:  Family history of non-Hodgkin's lymphoma (Sibling)  Family history of CHF (congestive heart failure)      Allergies    No Known Allergies    Intolerances        PHYSICAL EXAM  General: intubated and sedated in the ICU  HEENT: stable   Neck: supple  CV: normal S1S2 with no murmur rubs or gallops  Lungs: decreased breath sounds at the bases  Abdomen: soft non-tender non-distended, no hepatosplenomegaly  Ext: no clubbing cyanosis or edema  Skin: no rashes and no petechiae  Neuro: alert and oriented X3 no focal deficits    BLOOD SMEAR INTERPRETATION:    RADIOLOGY :

## 2019-05-10 NOTE — DIETITIAN INITIAL EVALUATION ADULT. - 25 CAL
Clinic Care Coordination RN :    Incoming call from the patient's wife stating the patient took a message about a call and is wondering who called.  CC spoke to Evangelina Melara MA who has a notation in the chart from yesterday in regards to a PA and she didn't call    CC read Dr Fox's note today   Alirio Fox MD   Internal Medicine      []Hide copied text  []Hover for attribution information  Furosemide being stopped as kidney function worsened with med and getting dehydrated. Discussed with pt. Inform pharmacy and then close encounter      Electronically signed by Alirio Fox MD at 9/27/2017 10:30 AM        Refill on 9/21/2017              Detailed Report     Patients wife agrees with the plan     Pebbles Leija RN / Clinical Care Coordinator     [address] [address]  [email or web address]  Office :  [phone] / Fax :  [phone]           2025

## 2019-05-10 NOTE — DIETITIAN INITIAL EVALUATION ADULT. - OTHER INFO
Pt seen for initial nutrition assessment for ICU length of stay on CCU.  Noted OG tube in place, Glucerna 1.2 started yesterday at 10 ml/hr, advanced to 20 ml/hr.  Recommend increasing goal rate to 70 ml/hr x 24 hors, as tolerated. Pt seen for initial nutrition assessment for ICU length of stay on CCU.  Noted OG tube in place, Glucerna 1.2 started yesterday at 10 ml/hr, advanced to 20 ml/hr.  TF on hold for KUSHAL.  Recommend increasing goal rate to 70 ml/hr x 24 hors, as tolerated.

## 2019-05-10 NOTE — DIETITIAN INITIAL EVALUATION ADULT. - PROBLEM SELECTOR PLAN 2
- etiology unclear  - patient has not been taking his medications. Also, noted to have chronic parietal infarct in CT head  - will get MR brain to r/o stroke  - will get vitb12, folate, iron panel, rpr and tsh levels stat  - no signs of infection, no new meds, no hypoxia, no seizure like activity, no CHF exacerbation, no hepatic failure, renal function stable  - if no more information is obtained from the above testing, will need to consult neurology. Consider early dementia?

## 2019-05-11 DIAGNOSIS — I34.0 NONRHEUMATIC MITRAL (VALVE) INSUFFICIENCY: ICD-10-CM

## 2019-05-11 LAB
ALBUMIN SERPL ELPH-MCNC: 2.9 G/DL — LOW (ref 3.3–5)
ALP SERPL-CCNC: 116 U/L — SIGNIFICANT CHANGE UP (ref 40–120)
ALT FLD-CCNC: 10 U/L — SIGNIFICANT CHANGE UP (ref 10–45)
ANION GAP SERPL CALC-SCNC: 11 MMOL/L — SIGNIFICANT CHANGE UP (ref 5–17)
ANION GAP SERPL CALC-SCNC: 12 MMOL/L — SIGNIFICANT CHANGE UP (ref 5–17)
AST SERPL-CCNC: 15 U/L — SIGNIFICANT CHANGE UP (ref 10–40)
BASOPHILS # BLD AUTO: 0 K/UL — SIGNIFICANT CHANGE UP (ref 0–0.2)
BASOPHILS NFR BLD AUTO: 0.3 % — SIGNIFICANT CHANGE UP (ref 0–2)
BILIRUB SERPL-MCNC: 1.3 MG/DL — HIGH (ref 0.2–1.2)
BUN SERPL-MCNC: 18 MG/DL — SIGNIFICANT CHANGE UP (ref 7–23)
BUN SERPL-MCNC: 19 MG/DL — SIGNIFICANT CHANGE UP (ref 7–23)
CALCIUM SERPL-MCNC: 8.3 MG/DL — LOW (ref 8.4–10.5)
CALCIUM SERPL-MCNC: 8.6 MG/DL — SIGNIFICANT CHANGE UP (ref 8.4–10.5)
CHLORIDE SERPL-SCNC: 102 MMOL/L — SIGNIFICANT CHANGE UP (ref 96–108)
CHLORIDE SERPL-SCNC: 104 MMOL/L — SIGNIFICANT CHANGE UP (ref 96–108)
CO2 SERPL-SCNC: 28 MMOL/L — SIGNIFICANT CHANGE UP (ref 22–31)
CO2 SERPL-SCNC: 28 MMOL/L — SIGNIFICANT CHANGE UP (ref 22–31)
CREAT SERPL-MCNC: 1.38 MG/DL — HIGH (ref 0.5–1.3)
CREAT SERPL-MCNC: 1.44 MG/DL — HIGH (ref 0.5–1.3)
EOSINOPHIL # BLD AUTO: 0.3 K/UL — SIGNIFICANT CHANGE UP (ref 0–0.5)
EOSINOPHIL NFR BLD AUTO: 3.6 % — SIGNIFICANT CHANGE UP (ref 0–6)
GAS PNL BLDA: SIGNIFICANT CHANGE UP
GLUCOSE BLDC GLUCOMTR-MCNC: 97 MG/DL — SIGNIFICANT CHANGE UP (ref 70–99)
GLUCOSE SERPL-MCNC: 78 MG/DL — SIGNIFICANT CHANGE UP (ref 70–99)
GLUCOSE SERPL-MCNC: 95 MG/DL — SIGNIFICANT CHANGE UP (ref 70–99)
HCT VFR BLD CALC: 37.2 % — LOW (ref 39–50)
HGB BLD-MCNC: 11.9 G/DL — LOW (ref 13–17)
LYMPHOCYTES # BLD AUTO: 0.8 K/UL — LOW (ref 1–3.3)
LYMPHOCYTES # BLD AUTO: 8.9 % — LOW (ref 13–44)
MAGNESIUM SERPL-MCNC: 1.8 MG/DL — SIGNIFICANT CHANGE UP (ref 1.6–2.6)
MAGNESIUM SERPL-MCNC: 1.9 MG/DL — SIGNIFICANT CHANGE UP (ref 1.6–2.6)
MCHC RBC-ENTMCNC: 28.7 PG — SIGNIFICANT CHANGE UP (ref 27–34)
MCHC RBC-ENTMCNC: 32 GM/DL — SIGNIFICANT CHANGE UP (ref 32–36)
MCV RBC AUTO: 89.8 FL — SIGNIFICANT CHANGE UP (ref 80–100)
MONOCYTES # BLD AUTO: 0.6 K/UL — SIGNIFICANT CHANGE UP (ref 0–0.9)
MONOCYTES NFR BLD AUTO: 6.8 % — SIGNIFICANT CHANGE UP (ref 2–14)
NEUTROPHILS # BLD AUTO: 6.9 K/UL — SIGNIFICANT CHANGE UP (ref 1.8–7.4)
NEUTROPHILS NFR BLD AUTO: 80.3 % — HIGH (ref 43–77)
PHOSPHATE SERPL-MCNC: 3.3 MG/DL — SIGNIFICANT CHANGE UP (ref 2.5–4.5)
PHOSPHATE SERPL-MCNC: 3.5 MG/DL — SIGNIFICANT CHANGE UP (ref 2.5–4.5)
PLATELET # BLD AUTO: 143 K/UL — LOW (ref 150–400)
POTASSIUM SERPL-MCNC: 3.6 MMOL/L — SIGNIFICANT CHANGE UP (ref 3.5–5.3)
POTASSIUM SERPL-MCNC: 4 MMOL/L — SIGNIFICANT CHANGE UP (ref 3.5–5.3)
POTASSIUM SERPL-SCNC: 3.6 MMOL/L — SIGNIFICANT CHANGE UP (ref 3.5–5.3)
POTASSIUM SERPL-SCNC: 4 MMOL/L — SIGNIFICANT CHANGE UP (ref 3.5–5.3)
PROT SERPL-MCNC: 6.4 G/DL — SIGNIFICANT CHANGE UP (ref 6–8.3)
RBC # BLD: 4.14 M/UL — LOW (ref 4.2–5.8)
RBC # FLD: 15.3 % — HIGH (ref 10.3–14.5)
SODIUM SERPL-SCNC: 142 MMOL/L — SIGNIFICANT CHANGE UP (ref 135–145)
SODIUM SERPL-SCNC: 143 MMOL/L — SIGNIFICANT CHANGE UP (ref 135–145)
VANCOMYCIN TROUGH SERPL-MCNC: 15.3 UG/ML — SIGNIFICANT CHANGE UP (ref 10–20)
WBC # BLD: 8.6 K/UL — SIGNIFICANT CHANGE UP (ref 3.8–10.5)
WBC # FLD AUTO: 8.6 K/UL — SIGNIFICANT CHANGE UP (ref 3.8–10.5)

## 2019-05-11 PROCEDURE — 71045 X-RAY EXAM CHEST 1 VIEW: CPT | Mod: 26

## 2019-05-11 PROCEDURE — 99291 CRITICAL CARE FIRST HOUR: CPT

## 2019-05-11 PROCEDURE — 71045 X-RAY EXAM CHEST 1 VIEW: CPT | Mod: 26,77

## 2019-05-11 RX ORDER — HALOPERIDOL DECANOATE 100 MG/ML
2.5 INJECTION INTRAMUSCULAR ONCE
Refills: 0 | Status: COMPLETED | OUTPATIENT
Start: 2019-05-11 | End: 2019-05-11

## 2019-05-11 RX ORDER — IPRATROPIUM/ALBUTEROL SULFATE 18-103MCG
3 AEROSOL WITH ADAPTER (GRAM) INHALATION EVERY 6 HOURS
Refills: 0 | Status: DISCONTINUED | OUTPATIENT
Start: 2019-05-11 | End: 2019-05-24

## 2019-05-11 RX ORDER — HYDRALAZINE HCL 50 MG
10 TABLET ORAL ONCE
Refills: 0 | Status: COMPLETED | OUTPATIENT
Start: 2019-05-11 | End: 2019-05-11

## 2019-05-11 RX ORDER — CHLORHEXIDINE GLUCONATE 213 G/1000ML
1 SOLUTION TOPICAL
Refills: 0 | Status: DISCONTINUED | OUTPATIENT
Start: 2019-05-11 | End: 2019-05-14

## 2019-05-11 RX ORDER — FUROSEMIDE 40 MG
20 TABLET ORAL ONCE
Refills: 0 | Status: COMPLETED | OUTPATIENT
Start: 2019-05-11 | End: 2019-05-11

## 2019-05-11 RX ORDER — LOSARTAN POTASSIUM 100 MG/1
50 TABLET, FILM COATED ORAL DAILY
Refills: 0 | Status: DISCONTINUED | OUTPATIENT
Start: 2019-05-11 | End: 2019-05-11

## 2019-05-11 RX ORDER — FUROSEMIDE 40 MG
40 TABLET ORAL ONCE
Refills: 0 | Status: COMPLETED | OUTPATIENT
Start: 2019-05-11 | End: 2019-05-11

## 2019-05-11 RX ORDER — HYDRALAZINE HCL 50 MG
10 TABLET ORAL EVERY 8 HOURS
Refills: 0 | Status: DISCONTINUED | OUTPATIENT
Start: 2019-05-11 | End: 2019-05-11

## 2019-05-11 RX ORDER — ISOSORBIDE DINITRATE 5 MG/1
10 TABLET ORAL EVERY 8 HOURS
Refills: 0 | Status: DISCONTINUED | OUTPATIENT
Start: 2019-05-11 | End: 2019-05-12

## 2019-05-11 RX ORDER — POTASSIUM CHLORIDE 20 MEQ
40 PACKET (EA) ORAL ONCE
Refills: 0 | Status: COMPLETED | OUTPATIENT
Start: 2019-05-11 | End: 2019-05-11

## 2019-05-11 RX ORDER — DEXMEDETOMIDINE HYDROCHLORIDE IN 0.9% SODIUM CHLORIDE 4 UG/ML
0.2 INJECTION INTRAVENOUS
Qty: 200 | Refills: 0 | Status: DISCONTINUED | OUTPATIENT
Start: 2019-05-11 | End: 2019-05-11

## 2019-05-11 RX ORDER — HYDRALAZINE HCL 50 MG
25 TABLET ORAL EVERY 8 HOURS
Refills: 0 | Status: DISCONTINUED | OUTPATIENT
Start: 2019-05-11 | End: 2019-05-12

## 2019-05-11 RX ORDER — MAGNESIUM SULFATE 500 MG/ML
1 VIAL (ML) INJECTION ONCE
Refills: 0 | Status: COMPLETED | OUTPATIENT
Start: 2019-05-11 | End: 2019-05-11

## 2019-05-11 RX ORDER — NITROGLYCERIN 6.5 MG
16.67 CAPSULE, EXTENDED RELEASE ORAL
Qty: 50 | Refills: 0 | Status: DISCONTINUED | OUTPATIENT
Start: 2019-05-11 | End: 2019-05-13

## 2019-05-11 RX ADMIN — Medication 5 MICROGRAM(S)/MIN: at 17:30

## 2019-05-11 RX ADMIN — Medication 250 MILLIGRAM(S): at 22:11

## 2019-05-11 RX ADMIN — Medication 40 MILLIEQUIVALENT(S): at 06:46

## 2019-05-11 RX ADMIN — Medication 10 MILLIGRAM(S): at 17:07

## 2019-05-11 RX ADMIN — Medication 20 MILLIGRAM(S): at 17:07

## 2019-05-11 RX ADMIN — ISOSORBIDE DINITRATE 10 MILLIGRAM(S): 5 TABLET ORAL at 22:11

## 2019-05-11 RX ADMIN — Medication 300 MILLIGRAM(S): at 08:04

## 2019-05-11 RX ADMIN — HEPARIN SODIUM 5000 UNIT(S): 5000 INJECTION INTRAVENOUS; SUBCUTANEOUS at 17:08

## 2019-05-11 RX ADMIN — Medication 250 MILLIGRAM(S): at 11:54

## 2019-05-11 RX ADMIN — Medication 3 MILLILITER(S): at 17:06

## 2019-05-11 RX ADMIN — Medication 81 MILLIGRAM(S): at 08:05

## 2019-05-11 RX ADMIN — Medication 5 MICROGRAM(S)/MIN: at 19:00

## 2019-05-11 RX ADMIN — HEPARIN SODIUM 5000 UNIT(S): 5000 INJECTION INTRAVENOUS; SUBCUTANEOUS at 05:22

## 2019-05-11 RX ADMIN — PIPERACILLIN AND TAZOBACTAM 25 GRAM(S): 4; .5 INJECTION, POWDER, LYOPHILIZED, FOR SOLUTION INTRAVENOUS at 15:04

## 2019-05-11 RX ADMIN — Medication 10 MILLIGRAM(S): at 23:45

## 2019-05-11 RX ADMIN — Medication 10 MILLIGRAM(S): at 22:11

## 2019-05-11 RX ADMIN — Medication 40 MILLIGRAM(S): at 08:04

## 2019-05-11 RX ADMIN — Medication 100 GRAM(S): at 06:46

## 2019-05-11 RX ADMIN — PIPERACILLIN AND TAZOBACTAM 25 GRAM(S): 4; .5 INJECTION, POWDER, LYOPHILIZED, FOR SOLUTION INTRAVENOUS at 21:22

## 2019-05-11 RX ADMIN — HALOPERIDOL DECANOATE 2.5 MILLIGRAM(S): 100 INJECTION INTRAMUSCULAR at 17:06

## 2019-05-11 RX ADMIN — Medication 1 APPLICATION(S): at 05:22

## 2019-05-11 RX ADMIN — PIPERACILLIN AND TAZOBACTAM 25 GRAM(S): 4; .5 INJECTION, POWDER, LYOPHILIZED, FOR SOLUTION INTRAVENOUS at 05:22

## 2019-05-11 RX ADMIN — Medication 40 MILLIGRAM(S): at 21:22

## 2019-05-11 RX ADMIN — DEXMEDETOMIDINE HYDROCHLORIDE IN 0.9% SODIUM CHLORIDE 4.86 MICROGRAM(S)/KG/HR: 4 INJECTION INTRAVENOUS at 12:28

## 2019-05-11 RX ADMIN — Medication 1 MILLIGRAM(S): at 08:05

## 2019-05-11 NOTE — AIRWAY REMOVAL NOTE  ADULT & PEDS - ARTIFICAL AIRWAY REMOVAL COMMENTS
Written order for extubation verified. The patient was identified by full name and birth date compared to the identification band. Present during the procedure was RN Kunal, pt on 35% aerosol mask tolerating well

## 2019-05-11 NOTE — PROGRESS NOTE ADULT - SUBJECTIVE AND OBJECTIVE BOX
Patient is a 68y old  Male who presents with a chief complaint of confusion (08 May 2019 12:11)      HPI:  ** Patient poor historian **    Patient is a 68 year old Non-Hodgkin's Lymphoma tx with chemotherapy in , DM2 with CKD stage 3, HTN, CHF EF 35% on cardiac cath 2016, pleural effusion s/p left pleuracentesis in 10/2016, cardiomyopathy, gout and HLD presents to the ED sent in from cardiologists office because of EKG changes. Patient is not sure why he is here. He was not sure where he was, why he is here or what even the year is. He remarks that he retired a few months ago and he stopped taking his medications then because he "wasn't feeling good". He states he was wife his wife earlier but then later he states shes in [place]. Patient currently denies chest pain, shortness of breath, palpitations, syncope, fevers, chills, recent travel or sick contacts. No new meds however he has stopped taking most of his meds. He overall, feels fine and is not sure why he is in the hospital.     It's very difficult to get a clear history from patient. I have tried to reach his daughter but have not received a call back as of yet. I have called [facility] patients pharmacy and he states patient picked up furosemide and irbersartan in April but has not picked up any other meds in months. Mentation in  AAOX3.    In the ED, VSS. Labs at baseline, CT head with old stroke, Trops elevated and peaked at 50, now normal, EKG with TWI in lateral leads. (08 May 2019 10:33)    5 as the above record indicates, the pt is here and is a very poor historian. I was asked by Dr Hawkins from oncology and by the pt's internist to see the pt as he did have a past of lymphoma. The oncology office will have to look up records as his history is not readily available. When I came to see the pt he was not able to supply much info about his cancer other than he had lymphoma and did not know the type or who treated him. Dr Hawkins thinks he may have been treated by his previous associate, Dr Sarmiento.     At the time of my visit the pt was alert and oriented but was not able to give specific details about any of his medical issues. His chemistries appeared all unremarkable and he was not febrile and his ct of the head showed nothing of note.  I will see if there are any records on his previous onc history and see if there is any reason to think it may have contributed to his present admit here.  events of last day was noted and pt was intubated and had heart failure. The records were reviewed at [facility] and pt in  had a large cell lymphoma and was treated with R CHOP. In  he went to [facility] and was treated for a recurrence with BR and he has remained in remission. 5/10 still intubated and sedated and labs reviewed and thus far no direct evidence for lymphoma recurrence.  Pt still intubated and sedated. Anemia from icu anemia causes no evidence for recurrent lymphoma.      MEDICATIONS  (STANDING):  allopurinol 300 milliGRAM(s) Oral daily  aspirin  chewable 81 milliGRAM(s) Oral daily  dextrose 5%. 1000 milliLiter(s) (50 mL/Hr) IV Continuous <Continuous>  dextrose 50% Injectable 12.5 Gram(s) IV Push once  fentaNYL   Infusion. 0.5 MICROgram(s)/kG/Hr (2.427 mL/Hr) IV Continuous <Continuous>  folic acid 1 milliGRAM(s) Oral daily  heparin  Injectable 5000 Unit(s) SubCutaneous every 12 hours  insulin lispro (HumaLOG) corrective regimen sliding scale   SubCutaneous three times a day before meals  midazolam Infusion 0.02 mG/kG/Hr (1.942 mL/Hr) IV Continuous <Continuous>  pantoprazole  Injectable 40 milliGRAM(s) IV Push at bedtime  petrolatum Ophthalmic Ointment 1 Application(s) Both EYES every 8 hours  piperacillin/tazobactam IVPB. 3.375 Gram(s) IV Intermittent every 8 hours  vancomycin  IVPB 1000 milliGRAM(s) IV Intermittent every 12 hours    ICU Vital Signs Last 24 Hrs  T(C): 36.6 (10 May 2019 07:30), Max: 38.4 (09 May 2019 22:30)  T(F): 97.9 (10 May 2019 07:30), Max: 101.1 (09 May 2019 22:30)  HR: 50 (10 May 2019 11:30) (50 - 70)  BP: 103/50 (10 May 2019 11:30) (69/33 - 162/69)  BP(mean): 68 (10 May 2019 11:30) (51 - 106)  ABP: --  ABP(mean): --  RR: 15 (10 May 2019 11:30) (11 - 20)  SpO2: 100% (10 May 2019 11:30) (93% - 100%)    LABS:                                   11.9   8.6   )-----------( 143      ( 11 May 2019 05:34 )             37.2   05-08    144  |  108  |  19  ----------------------------<  100<H>  3.5   |  22  |  1.50<H>    Ca    9.1      08 May 2019 06:00  Phos  3.8     05-07  Mg     1.9     05-07    TPro  7.3  /  Alb  3.5  /  TBili  0.9  /  DBili  x   /  AST  21  /  ALT  15  /  AlkPhos  137<H>  05-07      Urinalysis Basic - ( 08 May 2019 00:17 )    Color: Yellow / Appearance: Slightly Turbid / S.023 / pH: x  Gluc: x / Ketone: Negative  / Bili: Small / Urobili: 3 mg/dL   Blood: x / Protein: 300 mg/dL / Nitrite: Negative   Leuk Esterase: Negative / RBC: 2 /hpf / WBC 7 /HPF   Sq Epi: x / Non Sq Epi: 1 /hpf / Bacteria: Negative        ROS:  Negative except for:    PAST MEDICAL & SURGICAL HISTORY:  Pleural effusion  Moderate mitral regurgitation  Systolic heart failure  Rhinitis, allergic  Essential hypertension  DM type 2 (diabetes mellitus, type 2)  Non-Hodgkins Lymphoma  Non-Hodgkin lymphoma: h/o axillary dissection      SOCIAL HISTORY:    FAMILY HISTORY:  Family history of non-Hodgkin's lymphoma (Sibling)  Family history of CHF (congestive heart failure)      Allergies    No Known Allergies    Intolerances        PHYSICAL EXAM  General: intubated and sedated in the ICU  HEENT: stable   Neck: supple  CV: normal S1S2 with no murmur rubs or gallops  Lungs: decreased breath sounds at the bases  Abdomen: soft non-tender non-distended, no hepatosplenomegaly  Ext: no clubbing cyanosis or edema  Skin: no rashes and no petechiae  Neuro: alert and oriented X3 no focal deficits    BLOOD SMEAR INTERPRETATION:    RADIOLOGY :

## 2019-05-11 NOTE — PROGRESS NOTE ADULT - ASSESSMENT
Patient is a 68 year old Non-Hodgkin's Lymphoma tx with chemotherapy in 2004, DM2 with CKD stage 3, HTN, CHF EF 40-45% 2/2 ischemic / non ischemic cardiomyopathy, gout and HLD presents to the ED sent in from cardiologists office because of EKG changes. Patient was noted to be confused with short term memory loss and likely encephalopathy. Pt went into CHB requiring TVP and intubation.     #CHB S/P Micra PPM implant 5/10  - Tele: SR HR 60's, no overnight events Afrebile overnight  - Device to be interrogated by MDT Rep. ID card left at bedside for family  - EP signing off, recall if any issues. Further management provided by CCU Team  - Once discharged, pt to followup in EP clinic in 14 days for device check.       [phone]

## 2019-05-11 NOTE — PROGRESS NOTE ADULT - PROBLEM SELECTOR PLAN 1
- CT surgery following.   - Will discuss repeating ECHO now that pt is euvolemic  - Pt diuresed well over 24 hours. Lasix discontinued. Keep CVP 0-8. Diurese as needed.

## 2019-05-11 NOTE — PROGRESS NOTE ADULT - ASSESSMENT
68 year old Non-Hodgkin's Lymphoma tx with chemotherapy in 2004, DM2 with CKD stage 3, HTN, CHF EF 35% on cardiac cath 12/ 2016, pleural effusion s/p left pleuracentesis in 10/2016, cardiomyopathy, gout and HLD presents to the ED sent in from cardiologists office because of AMS    #  sp intubated, sedated   CHB sp micra ppm implant 5/10  CCU mgmt  cards and EP fu    #Acute encephalopathy. ?etiology   r/o infectious etiology  r/o lymphoma mets to brain /meninges  r/o early dementia  -chronic parietal infarct in CT head  pending MR brain when feasible , neuro cs fu  vitb12, folate, iron panel, rpr and tsh levels wnl  ?LP when feasible    #high grade Fevers  UA neg, CXR neg, bld cx fu  ?need for LP  fu ID recs  -  # Chronic systolic heart failure.    - TTE EF 45%, likely severe MR, moderately enlarged RV , severe pulmonary hypertension. Estimated PASP = 75 mmHg.  - Medication non compliance  - Holding antihypertensives given CHB  - started furosemide w/ I+Os given signs of volume overload.   CT sx cs noted for severe MR eval    # Non-Hodgkin lymphoma of lymph nodes of neck, unspecified non-Hodgkin lymphoma type.  Plan: - treated with chemotherapy in 2014.   Anemia-monitor h/h  Onc cs fu noted    # Essential hypertension.  Plan: - bp stable  - c/w arb and beta blocker.     # Type 2 diabetes mellitus with chronic kidney disease, without long-term current use of insulin, unspecified CKD stage. Plan: - fs achs  - fs controlled  - start sliding scale insulin  - h    # Chronic gout without tophus, unspecified cause, unspecified site.   - c/w allopurinol 300 mg daily.

## 2019-05-11 NOTE — PROGRESS NOTE ADULT - SUBJECTIVE AND OBJECTIVE BOX
Patient is a 68y old  Male who presents with a chief complaint of confusion (11 May 2019 09:36)      INTERVAL HPI/OVERNIGHT EVENTS: intubated, sedated      Vital Signs Last 24 Hrs  T(C): 37.2 (11 May 2019 13:15), Max: 37.7 (10 May 2019 18:30)  T(F): 99 (11 May 2019 13:15), Max: 99.9 (10 May 2019 18:30)  HR: 60 (11 May 2019 14:30) (48 - 60)  BP: 134/64 (11 May 2019 14:00) (104/49 - 166/82)  BP(mean): 88 (11 May 2019 14:00) (58 - 120)  RR: 9 (11 May 2019 14:30) (0 - 30)  SpO2: 100% (11 May 2019 14:30) (94% - 100%)    allopurinol 300 milliGRAM(s) Oral daily  aspirin  chewable 81 milliGRAM(s) Oral daily  dexmedetomidine Infusion 0.2 MICROgram(s)/kG/Hr IV Continuous <Continuous>  dextrose 40% Gel 15 Gram(s) Oral once PRN  dextrose 5%. 1000 milliLiter(s) IV Continuous <Continuous>  dextrose 50% Injectable 12.5 Gram(s) IV Push once  folic acid 1 milliGRAM(s) Oral daily  furosemide   Injectable 40 milliGRAM(s) IV Push every 12 hours  glucagon  Injectable 1 milliGRAM(s) IntraMuscular once PRN  heparin  Injectable 5000 Unit(s) SubCutaneous every 12 hours  insulin lispro (HumaLOG) corrective regimen sliding scale   SubCutaneous three times a day before meals  pantoprazole  Injectable 40 milliGRAM(s) IV Push at bedtime  petrolatum Ophthalmic Ointment 1 Application(s) Both EYES every 8 hours  piperacillin/tazobactam IVPB. 3.375 Gram(s) IV Intermittent every 8 hours  sodium chloride 0.9% lock flush 10 milliLiter(s) IV Push every 1 hour PRN  vancomycin  IVPB 1000 milliGRAM(s) IV Intermittent every 12 hours      PHYSICAL EXAM:  GENERAL: intubated sedated  ENMT: Moist mucous membranes  NECK: Supple, No JVD, Normal thyroid  NERVOUS SYSTEM:  Alert & Oriented X0,   CHEST/LUNG: Clear to auscultation bilaterally ant  HEART: Regular rate and rhythm;   ABDOMEN: Soft, Nontender, Nondistended; Bowel sounds present  EXTREMITIES:  edema  LYMPH: No lymphadenopathy noted  SKIN: No rashes or lesions    Consultant(s) Notes Reviewed:  [x ] YES  [ ] NO  Care Discussed with Consultants/Other Providers [ x] YES  [ ] NO    LABS:                        11.9   8.6   )-----------( 143      ( 11 May 2019 05:34 )             37.2         143  |  104  |  19  ----------------------------<  95  4.0   |  28  |  1.44<H>    Ca    8.3<L>      11 May 2019 11:03  Phos  3.3       Mg     1.9         TPro  6.4  /  Alb  2.9<L>  /  TBili  1.3<H>  /  DBili  x   /  AST  15  /  ALT  10  /  AlkPhos  116  11    PT/INR - ( 10 May 2019 04:24 )   PT: 17.3 sec;   INR: 1.50 ratio         PTT - ( 10 May 2019 04:24 )  PTT:30.7 sec  Urinalysis Basic - ( 10 May 2019 00:35 )    Color: Light Yellow / Appearance: Clear / S.012 / pH: x  Gluc: x / Ketone: Negative  / Bili: Negative / Urobili: Negative   Blood: x / Protein: 30 mg/dL / Nitrite: Negative   Leuk Esterase: Small / RBC: 1 /hpf / WBC 1 /HPF   Sq Epi: x / Non Sq Epi: 1 / Bacteria: x      CAPILLARY BLOOD GLUCOSE      POCT Blood Glucose.: 97 mg/dL (11 May 2019 08:01)  POCT Blood Glucose.: 81 mg/dL (10 May 2019 22:33)  POCT Blood Glucose.: 88 mg/dL (10 May 2019 22:31)  POCT Blood Glucose.: 103 mg/dL (10 May 2019 17:58)      ABG - ( 11 May 2019 12:41 )  pH, Arterial: 7.41  pH, Blood: x     /  pCO2: 47    /  pO2: 125   / HCO3: 29    / Base Excess: 4.1   /  SaO2: 99                Urinalysis Basic - ( 10 May 2019 00:35 )    Color: Light Yellow / Appearance: Clear / S.012 / pH: x  Gluc: x / Ketone: Negative  / Bili: Negative / Urobili: Negative   Blood: x / Protein: 30 mg/dL / Nitrite: Negative   Leuk Esterase: Small / RBC: 1 /hpf / WBC 1 /HPF   Sq Epi: x / Non Sq Epi: 1 / Bacteria: x        RADIOLOGY & ADDITIONAL TESTS:    Imaging Personally Reviewed:  [x ] YES  [ ] NO

## 2019-05-11 NOTE — PROGRESS NOTE ADULT - ASSESSMENT
5/8 as the above record indicates, the pt is here and is a very poor historian. I was asked by Dr Hawkins from oncology and by the pt's internist to see the pt as he did have a past of lymphoma. The oncology office will have to look up records as his history is not readily available. When I came to see the pt he was not able to supply much info about his cancer other than he had lymphoma and did not know the type or who treated him. Dr Hawkins thinks he may have been treated by his previous associate, Dr Sarmiento.     At the time of my visit the pt was alert and oriented but was not able to give specific details about any of his medical issues. His chemistries appeared all unremarkable and he was not febrile and his ct of the head showed nothing of note.  I will see if there are any records on his previous onc history and see if there is any reason to think it may have contributed to his present admit here.     5/9 events of last day was noted and pt was intubated and had heart failure. The records were reviewed at [facility] and pt in 2003 had a large cell lymphoma and was treated with R CHOP. [place] he [place] [facility] [place] treated for a recurrence with BR and he has remained in remission.   5/10 still intubated and sedated and labs reviewed and thus far no direct evidence for lymphoma recurrence.  . 5/11 Pt still intubated and sedated. Anemia from icu anemia causes no evidence for recurrent lymphoma.

## 2019-05-11 NOTE — PROGRESS NOTE ADULT - ATTENDING COMMENTS
In summary 67 yo man s/p MICRA RHC/LCH 5/10.    Wean vent as tolerated.  Check blood cx if spikes again.

## 2019-05-11 NOTE — PROGRESS NOTE ADULT - ASSESSMENT
69 yo Non-Hodgkin's Lymphoma tx w/ chemo in 2004, DM2 w/ CKD stage 3, HTN, CHF EF 35% on cardiac cath 12/ 2016, pleural effusion s/p left pleuracentesis in 10/2016, cardiomyopathy, gout and HLD presents to the ED sent in from cardiologists office because of EKG changes on 5/8. Pt noted to be in 2nd degree w/ RBBB alternating w/ LBBB and was intubated for airway protection. ECHO severe MR. Pt was taken for RHC/LHC and shown to have elevated filling pressures and CAD however nonobstructive. Pt diuresed w/ lasix. On 5/10 pt underwent Micra PPM w/ VVI 60. On 5/11 pt was extubated.

## 2019-05-11 NOTE — PROGRESS NOTE ADULT - SUBJECTIVE AND OBJECTIVE BOX
INTERVAL HPI/OVERNIGHT EVENTS: Pt seen in bed intubated with all therapies in place    MEDICATIONS  (STANDING):  allopurinol 300 milliGRAM(s) Oral daily  aspirin  chewable 81 milliGRAM(s) Oral daily  dextrose 5%. 1000 milliLiter(s) (50 mL/Hr) IV Continuous <Continuous>  dextrose 50% Injectable 12.5 Gram(s) IV Push once  fentaNYL   Infusion. 0.5 MICROgram(s)/kG/Hr (2.427 mL/Hr) IV Continuous <Continuous>  folic acid 1 milliGRAM(s) Oral daily  furosemide   Injectable 40 milliGRAM(s) IV Push every 12 hours  heparin  Injectable 5000 Unit(s) SubCutaneous every 12 hours  insulin lispro (HumaLOG) corrective regimen sliding scale   SubCutaneous three times a day before meals  midazolam Infusion 0.02 mG/kG/Hr (1.942 mL/Hr) IV Continuous <Continuous>  pantoprazole  Injectable 40 milliGRAM(s) IV Push at bedtime  petrolatum Ophthalmic Ointment 1 Application(s) Both EYES every 8 hours  piperacillin/tazobactam IVPB. 3.375 Gram(s) IV Intermittent every 8 hours  vancomycin  IVPB 1000 milliGRAM(s) IV Intermittent every 12 hours    MEDICATIONS  (PRN):  dextrose 40% Gel 15 Gram(s) Oral once PRN Blood Glucose LESS THAN 70 milliGRAM(s)/deciliter  glucagon  Injectable 1 milliGRAM(s) IntraMuscular once PRN Glucose LESS THAN 70 milligrams/deciliter  sodium chloride 0.9% lock flush 10 milliLiter(s) IV Push every 1 hour PRN Pre/post blood products, medications, blood draw, and to maintain line patency      Allergies    No Known Allergies    Intolerances      ROS:  Unable as pt is intubated     Vital Signs Last 24 Hrs  T(C): 37.7 (11 May 2019 06:25), Max: 37.7 (10 May 2019 18:30)  T(F): 99.9 (11 May 2019 06:25), Max: 99.9 (10 May 2019 18:30)  HR: 60 (11 May 2019 09:00) (48 - 60)  BP: 118/56 (11 May 2019 09:00) (93/47 - 163/87)  BP(mean): 86 (11 May 2019 09:00) (58 - 115)  RR: 12 (11 May 2019 09:00) (0 - 30)  SpO2: 97% (11 May 2019 09:00) (94% - 100%)    Physical Exam:  Neurological: Sedated  Respiratory: Intubated to Vent  Cardiovascular: (+) S1 & S2, RRR  Gastrointestinal: soft, NT, nondistended, (+) BS  Extremities: No pedal edema  Skin:  normal skin color and pigmentation, no skin lesions  Right groin site no bleeding or swelling noted.          LABS:                        11.9   8.6   )-----------( 143      ( 11 May 2019 05:34 )             37.2     05-11    142  |  102  |  18  ----------------------------<  78  3.6   |  28  |  1.38<H>    Ca    8.6      11 May 2019 05:34  Phos  3.5       Mg     1.8         TPro  6.4  /  Alb  2.9<L>  /  TBili  1.3<H>  /  DBili  x   /  AST  15  /  ALT  10  /  AlkPhos  116  05-11    PT/INR - ( 10 May 2019 04:24 )   PT: 17.3 sec;   INR: 1.50 ratio         PTT - ( 10 May 2019 04:24 )  PTT:30.7 sec  Urinalysis Basic - ( 10 May 2019 00:35 )    Color: Light Yellow / Appearance: Clear / S.012 / pH: x  Gluc: x / Ketone: Negative  / Bili: Negative / Urobili: Negative   Blood: x / Protein: 30 mg/dL / Nitrite: Negative   Leuk Esterase: Small / RBC: 1 /hpf / WBC 1 /HPF   Sq Epi: x / Non Sq Epi: 1 / Bacteria: x

## 2019-05-11 NOTE — PROGRESS NOTE ADULT - SUBJECTIVE AND OBJECTIVE BOX
====================  CCU MIDNIGHT ROUNDS  ====================    NIK GRIMM  [id number]  Patient is a 68y old  Male who presents with a chief complaint of confusion (11 May 2019 15:13)      ====================  SUMMARY:  67 yo Non-Hodgkin's Lymphoma tx w/ chemo in 2004, DM2 w/ CKD stage 3, HTN, CHF EF 35% on cardiac cath 12/ 2016, pleural effusion s/p left pleuracentesis in 10/2016, cardiomyopathy, gout and HLD presents to the ED sent in from cardiologists office because of EKG changes on 5/8. Pt noted to be in 2nd degree w/ RBBB alternating w/ LBBB and was intubated for airway protection. ECHO severe MR. Pt was taken for RHC/LHC and shown to have elevated filling pressures and CAD however nonobstructive. Pt diuresed w/ lasix. On 5/10 pt underwent Micra PPM w/ VVI 60. On 5/11 pt was extubated.   ====================      ====================  NEW EVENTS: episode of desaturation with CVP =11mmHg, stat dose lasix 40mg IVP given with appropriate response. Initiated oral hydralazine and isosorbide, weaning off nitro gtt.   ====================    MEDICATIONS  (STANDING):  allopurinol 300 milliGRAM(s) Oral daily  aspirin  chewable 81 milliGRAM(s) Oral daily  dextrose 5%. 1000 milliLiter(s) (50 mL/Hr) IV Continuous <Continuous>  dextrose 50% Injectable 12.5 Gram(s) IV Push once  folic acid 1 milliGRAM(s) Oral daily  heparin  Injectable 5000 Unit(s) SubCutaneous every 12 hours  hydrALAZINE 10 milliGRAM(s) Oral every 8 hours  insulin lispro (HumaLOG) corrective regimen sliding scale   SubCutaneous three times a day before meals  isosorbide   dinitrate Tablet (ISORDIL) 10 milliGRAM(s) Oral every 8 hours  nitroglycerin  Infusion 16.667 MICROgram(s)/Min (5 mL/Hr) IV Continuous <Continuous>  piperacillin/tazobactam IVPB. 3.375 Gram(s) IV Intermittent every 8 hours  vancomycin  IVPB 1000 milliGRAM(s) IV Intermittent every 12 hours    MEDICATIONS  (PRN):  ALBUTerol/ipratropium for Nebulization 3 milliLiter(s) Nebulizer every 6 hours PRN Bronchospasm  dextrose 40% Gel 15 Gram(s) Oral once PRN Blood Glucose LESS THAN 70 milliGRAM(s)/deciliter  glucagon  Injectable 1 milliGRAM(s) IntraMuscular once PRN Glucose LESS THAN 70 milligrams/deciliter  sodium chloride 0.9% lock flush 10 milliLiter(s) IV Push every 1 hour PRN Pre/post blood products, medications, blood draw, and to maintain line patency      ====================  VITALS (Last 12 hrs):  ====================    T(C): 37 (05-11-19 @ 19:00), Max: 37.3 (05-11-19 @ 11:00)  T(F): 98.6 (05-11-19 @ 19:00), Max: 99.1 (05-11-19 @ 11:00)  HR: 58 (05-11-19 @ 22:30) (58 - 62)  BP: 136/61 (05-11-19 @ 22:30) (116/53 - 185/64)  BP(mean): 100 (05-11-19 @ 22:30) (76 - 124)  RR: 10 (05-11-19 @ 22:30) (9 - 38)  SpO2: 100% (05-11-19 @ 22:30) (93% - 100%)  CVP(mm Hg): 1 (05-11-19 @ 22:15) (1 - 24)    I&O's Summary    10 May 2019 07:01  -  11 May 2019 07:00  --------------------------------------------------------  IN: 1227.5 mL / OUT: 2800 mL / NET: -1572.5 mL    11 May 2019 07:01  -  11 May 2019 22:49  --------------------------------------------------------  IN: 919.5 mL / OUT: 2250 mL / NET: -1330.5 mL        Mode: CPAP with PS  FiO2: 35  PEEP: 5  PS: 10  MAP: 9  PIP: 16      ====================  NEW LABS:  ====================      05-11    143  |  104  |  19  ----------------------------<  95  4.0   |  28  |  1.44<H>    Ca    8.3<L>      11 May 2019 11:03  Phos  3.3     05-11  Mg     1.9     05-11    TPro  6.4  /  Alb  2.9<L>  /  TBili  1.3<H>  /  DBili  x   /  AST  15  /  ALT  10  /  AlkPhos  116  05-11    PT/INR - ( 10 May 2019 04:24 )   PT: 17.3 sec;   INR: 1.50 ratio         PTT - ( 10 May 2019 04:24 )  PTT:30.7 sec      ABG - ( 11 May 2019 15:52 )  pH, Arterial: 7.46  pH, Blood: x     /  pCO2: 43    /  pO2: 88    / HCO3: 30    / Base Excess: 5.6   /  SaO2: 98        ====================  PLAN:  ====================  #Neuro  AMS  -A+O x1 to person  -would hold off on haldol if pt gets agitated due to QTc >600ms  -reoriented pt  -Psych consult in the AM  -safety precautions    #pulm  Acute respiratory failure s/p extubation today  -3LNC, monitor tolerance  -post extubation ABG AML    #Cardiac  CHB s/p micra PPM  -currently paced rate 60bpm   -will need to f/u EP after discharge    Severe MR   -eval in progress by CT surgery, Dr. Teresa  -lasix IVP PRN CVP goal 6-9     HTN  -wean off nitro gtt as tolerated by BP  -Started hydralazine 10 TID, up titrated as tolerated  -Started isosobide 10mg TID, up titrate as tolerated    #Renal   STEPHEN on CKD stage III  -Trending BUN/SCr, SCr 1.44 with peak @ 1.82  -avoid nephrotoxic meds, renally dose      Inna Melara CCU NP  Beeper #[id number]  Spectra # [phone]

## 2019-05-11 NOTE — PROGRESS NOTE ADULT - SUBJECTIVE AND OBJECTIVE BOX
Events:    Review Of Systems:  Constitutional: denies fever, chills, Fatigue   HEENT: denies Blurred vision, Eye Pain, Headache   Respiratory: denies Cough, Wheezing , Shortness of breath  Cardiovascular: denies Chest Pain, Palpitations,  ARGUETA   Gastrointestinal: denies Abdominal Pain, Diarrhea, Constipation   Genitourinary: denies Nocturia, Dysuria, Incontinence  Extremities: denies Swelling, Joint Pain  Neurologic: denies Focal deficit, Paresthesias, Syncope  Lymphatic: denies Swelling, Lymphadenopathy   Skin: denies Rash, Ecchymoses, Wounds   Psychiatry: denies Depression, Suicidal/Homicidal Ideation, anxiety  [X ] 10 point review of systems is otherwise negative except as mentioned above         Medications:  allopurinol 300 milliGRAM(s) Oral daily  aspirin  chewable 81 milliGRAM(s) Oral daily  dextrose 40% Gel 15 Gram(s) Oral once PRN  dextrose 5%. 1000 milliLiter(s) IV Continuous <Continuous>  dextrose 50% Injectable 12.5 Gram(s) IV Push once  fentaNYL   Infusion. 0.5 MICROgram(s)/kG/Hr IV Continuous <Continuous>  folic acid 1 milliGRAM(s) Oral daily  furosemide   Injectable 40 milliGRAM(s) IV Push every 12 hours  glucagon  Injectable 1 milliGRAM(s) IntraMuscular once PRN  heparin  Injectable 5000 Unit(s) SubCutaneous every 12 hours  insulin lispro (HumaLOG) corrective regimen sliding scale   SubCutaneous three times a day before meals  midazolam Infusion 0.02 mG/kG/Hr IV Continuous <Continuous>  pantoprazole  Injectable 40 milliGRAM(s) IV Push at bedtime  petrolatum Ophthalmic Ointment 1 Application(s) Both EYES every 8 hours  piperacillin/tazobactam IVPB. 3.375 Gram(s) IV Intermittent every 8 hours  sodium chloride 0.9% lock flush 10 milliLiter(s) IV Push every 1 hour PRN  vancomycin  IVPB 1000 milliGRAM(s) IV Intermittent every 12 hours    PMH/PSH/FH/SH: [ ] Unchanged  Vitals:  T(C): 37.7 (19 @ 06:25), Max: 37.7 (05-10-19 @ 18:30)  HR: 60 (19 @ 07:30) (48 - 62)  BP: 163/87 (19 @ 07:30) (85/42 - 163/87)  BP(mean): 111 (19 @ 07:30) (58 - 115)  RR: 30 (19 @ 07:30) (0 - 30)  SpO2: 98% (19 @ 07:30) (94% - 100%)  Wt(kg): --  Daily Height in cm: 182.88 (10 May 2019 14:09)    Daily Weight in k (10 May 2019 14:09)  I&O's Summary    10 May 2019 07:  -  11 May 2019 07:00  --------------------------------------------------------  IN: 1227.5 mL / OUT: 2800 mL / NET: -1572.5 mL    11 May 2019 07:  -  11 May 2019 07:55  --------------------------------------------------------  IN: 125 mL / OUT: 0 mL / NET: 125 mL        Physical Exam:  Appearance: [ ] Normal [ ] NAD  Eyes: [ ] PERRL [ ] EOMI  HENT: [ ] Normal oral muscosa [ ]NC/AT  Cardiovascular: [ ] S1 [ ] S2 [ ] RRR [ ] No m/r/g [ ]No edema [ ] JVP  Procedural Access Site: [ ] No hematoma [ ] Non-tender to palpation [ ] 2+ pulse [ ] No bruit [ ] No Ecchymosis  Respiratory: [ ] Clear to auscultation bilaterally  Gastrointestinal: [ ] Soft [ ] Non-tender [ ] Non-distended [ ] BS+  Musculoskeletal: [ ] No clubbing [ ] No joint deformity   Neurologic: [ ] Non-focal  Lymphatic: [ ] No lymphadenopathy  Psychiatry: [ ] AAOx3 [ ] Mood & affect appropriate  Skin: [ ] No rashes [ ] No ecchymoses [ ] No cyanosis        142  |  102  |  18  ----------------------------<  78  3.6   |  28  |  1.38<H>    Ca    8.6      11 May 2019 05:34  Phos  3.5     05-  Mg     1.8     05-11    TPro  6.4  /  Alb  2.9<L>  /  TBili  1.3<H>  /  DBili  x   /  AST  15  /  ALT  10  /  AlkPhos  116  05-11    PT/INR - ( 10 May 2019 04:24 )   PT: 17.3 sec;   INR: 1.50 ratio         PTT - ( 10 May 2019 04:24 )  PTT:30.7 sec              ECG:    Echo:    Stress Testing:     Cath:    Imaging:    Interpretation of Telemetry: HPI:   69 yo Non-Hodgkin's Lymphoma tx w/ chemo in , DM2 with CKD stage 3, HTN, CHF EF 35% on cardiac cath 2016, pleural effusion s/p left pleuracentesis in 10/2016, cardiomyopathy, gout and HLD presents to the ED sent in from cardiologists office because of EKG changes. Patient is not sure why he is here. He was not sure where he was, why he is here or what even the year is. He remarks that he retired a few months ago and he stopped taking his medications then because he "wasn't feeling good". He states he was wife his wife earlier but then later he states shes in [place]. Patient currently denies chest pain, shortness of breath, palpitations, syncope, fevers, chills, recent travel or sick contacts. No new meds however he has stopped taking most of his meds. He overall, feels fine and is not sure why he is in the hospital.     It's very difficult to get a clear history from patient. I have tried to reach his daughter but have not received a call back as of yet. I have called [facility] patients pharmacy and he states patient picked up furosemide and irbersartan in April but has not picked up any other meds in months. Mentation in 2016 AAOX3.    In the ED, VSS. Labs at baseline, CT head with old stroke, Trops elevated and peaked at 50, now normal, EKG with TWI in lateral leads. (08 May 2019 10:33)      Events:    Review Of Systems:  Constitutional: denies fever, chills, Fatigue   HEENT: denies Blurred vision, Eye Pain, Headache   Respiratory: denies Cough, Wheezing , Shortness of breath  Cardiovascular: denies Chest Pain, Palpitations,  ARGUETA   Gastrointestinal: denies Abdominal Pain, Diarrhea, Constipation   Genitourinary: denies Nocturia, Dysuria, Incontinence  Extremities: denies Swelling, Joint Pain  Neurologic: denies Focal deficit, Paresthesias, Syncope  Lymphatic: denies Swelling, Lymphadenopathy   Skin: denies Rash, Ecchymoses, Wounds   Psychiatry: denies Depression, Suicidal/Homicidal Ideation, anxiety  [X ] 10 point review of systems is otherwise negative except as mentioned above         Medications:  allopurinol 300 milliGRAM(s) Oral daily  aspirin  chewable 81 milliGRAM(s) Oral daily  dextrose 40% Gel 15 Gram(s) Oral once PRN  dextrose 5%. 1000 milliLiter(s) IV Continuous <Continuous>  dextrose 50% Injectable 12.5 Gram(s) IV Push once  fentaNYL   Infusion. 0.5 MICROgram(s)/kG/Hr IV Continuous <Continuous>  folic acid 1 milliGRAM(s) Oral daily  furosemide   Injectable 40 milliGRAM(s) IV Push every 12 hours  glucagon  Injectable 1 milliGRAM(s) IntraMuscular once PRN  heparin  Injectable 5000 Unit(s) SubCutaneous every 12 hours  insulin lispro (HumaLOG) corrective regimen sliding scale   SubCutaneous three times a day before meals  midazolam Infusion 0.02 mG/kG/Hr IV Continuous <Continuous>  pantoprazole  Injectable 40 milliGRAM(s) IV Push at bedtime  petrolatum Ophthalmic Ointment 1 Application(s) Both EYES every 8 hours  piperacillin/tazobactam IVPB. 3.375 Gram(s) IV Intermittent every 8 hours  sodium chloride 0.9% lock flush 10 milliLiter(s) IV Push every 1 hour PRN  vancomycin  IVPB 1000 milliGRAM(s) IV Intermittent every 12 hours    PMH/PSH/FH/SH: [ ] Unchanged  Vitals:  T(C): 37.7 (19 @ 06:25), Max: 37.7 (05-10-19 @ 18:30)  HR: 60 (19 @ 07:30) (48 - 62)  BP: 163/87 (19 @ 07:30) (85/42 - 163/87)  BP(mean): 111 (19 @ 07:30) (58 - 115)  RR: 30 (19 @ 07:30) (0 - 30)  SpO2: 98% (19 @ 07:30) (94% - 100%)  Wt(kg): --  Daily Height in cm: 182.88 (10 May 2019 14:09)    Daily Weight in k (10 May 2019 14:09)  I&O's Summary    10 May 2019 07:01  -  11 May 2019 07:00  --------------------------------------------------------  IN: 1227.5 mL / OUT: 2800 mL / NET: -1572.5 mL    11 May 2019 07:01  -  11 May 2019 07:55  --------------------------------------------------------  IN: 125 mL / OUT: 0 mL / NET: 125 mL        Physical Exam:  Appearance: [ ] Normal [ ] NAD  Eyes: [ ] PERRL [ ] EOMI  HENT: [ ] Normal oral muscosa [ ]NC/AT  Cardiovascular: [ ] S1 [ ] S2 [ ] RRR [ ] No m/r/g [ ]No edema [ ] JVP  Procedural Access Site: [ ] No hematoma [ ] Non-tender to palpation [ ] 2+ pulse [ ] No bruit [ ] No Ecchymosis  Respiratory: [ ] Clear to auscultation bilaterally  Gastrointestinal: [ ] Soft [ ] Non-tender [ ] Non-distended [ ] BS+  Musculoskeletal: [ ] No clubbing [ ] No joint deformity   Neurologic: [ ] Non-focal  Lymphatic: [ ] No lymphadenopathy  Psychiatry: [ ] AAOx3 [ ] Mood & affect appropriate  Skin: [ ] No rashes [ ] No ecchymoses [ ] No cyanosis        142  |  102  |  18  ----------------------------<  78  3.6   |  28  |  1.38<H>    Ca    8.6      11 May 2019 05:34  Phos  3.5     -  Mg     1.8         TPro  6.4  /  Alb  2.9<L>  /  TBili  1.3<H>  /  DBili  x   /  AST  15  /  ALT  10  /  AlkPhos  116  05-11    PT/INR - ( 10 May 2019 04:24 )   PT: 17.3 sec;   INR: 1.50 ratio         PTT - ( 10 May 2019 04:24 )  PTT:30.7 sec              ECG:    Echo:    Stress Testing:     Cath:    Imaging:    Interpretation of Telemetry: HPI:   69 yo Non-Hodgkin's Lymphoma tx w/ chemo in , DM2 w/ CKD stage 3, HTN, CHF EF 35% on cardiac cath 2016, pleural effusion s/p left pleuracentesis in 10/2016, cardiomyopathy, gout and HLD presents to the ED sent in from cardiologists office because of EKG changes on . Pt noted to be in 2nd degree w/ RBBB alternating w/ LBBB and was intubated for airway protection. ECHO severe MR. Pt was taken for RHC/LHC and shown to have elevated filling pressures and CAD however nonobstructive. Pt diuresed w/ lasix. On 5/10 pt underwent Micra PPM w/ VVI 60. On  pt was extubated.     Events: Pt extubated without complications. Diuretics held.     Review Of Systems:  Constitutional: denies fever, chills, Fatigue   HEENT: denies Blurred vision, Eye Pain, Headache   Respiratory: denies Cough, Wheezing , Shortness of breath  Cardiovascular: denies Chest Pain, Palpitations,  ARGUETA   Gastrointestinal: denies Abdominal Pain, Diarrhea, Constipation   Genitourinary: denies Nocturia, Dysuria, Incontinence  Extremities: denies Swelling, Joint Pain  Neurologic: denies Focal deficit, Paresthesias, Syncope  Lymphatic: denies Swelling, Lymphadenopathy   Skin: denies Rash, Ecchymoses, Wounds   Psychiatry: denies Depression, Suicidal/Homicidal Ideation, anxiety  [X ] 10 point review of systems is otherwise negative except as mentioned above         Medications:  allopurinol 300 milliGRAM(s) Oral daily  aspirin  chewable 81 milliGRAM(s) Oral daily  dextrose 40% Gel 15 Gram(s) Oral once PRN  dextrose 5%. 1000 milliLiter(s) IV Continuous <Continuous>  dextrose 50% Injectable 12.5 Gram(s) IV Push once  fentaNYL   Infusion. 0.5 MICROgram(s)/kG/Hr IV Continuous <Continuous>  folic acid 1 milliGRAM(s) Oral daily  furosemide   Injectable 40 milliGRAM(s) IV Push every 12 hours  glucagon  Injectable 1 milliGRAM(s) IntraMuscular once PRN  heparin  Injectable 5000 Unit(s) SubCutaneous every 12 hours  insulin lispro (HumaLOG) corrective regimen sliding scale   SubCutaneous three times a day before meals  midazolam Infusion 0.02 mG/kG/Hr IV Continuous <Continuous>  pantoprazole  Injectable 40 milliGRAM(s) IV Push at bedtime  petrolatum Ophthalmic Ointment 1 Application(s) Both EYES every 8 hours  piperacillin/tazobactam IVPB. 3.375 Gram(s) IV Intermittent every 8 hours  sodium chloride 0.9% lock flush 10 milliLiter(s) IV Push every 1 hour PRN  vancomycin  IVPB 1000 milliGRAM(s) IV Intermittent every 12 hours    PMH/PSH/FH/SH: [ ] Unchanged  Vitals:  T(C): 37.7 (19 @ 06:25), Max: 37.7 (05-10-19 @ 18:30)  HR: 60 (19 @ 07:30) (48 - 62)  BP: 163/87 (19 @ 07:30) (85/42 - 163/87)  BP(mean): 111 (19 @ 07:30) (58 - 115)  RR: 30 (19 @ 07:30) (0 - 30)  SpO2: 98% (19 @ 07:30) (94% - 100%)  Wt(kg): --  Daily Height in cm: 182.88 (10 May 2019 14:09)    Daily Weight in k (10 May 2019 14:09)  I&O's Summary    10 May 2019 07:  -  11 May 2019 07:00  --------------------------------------------------------  IN: 1227.5 mL / OUT: 2800 mL / NET: -1572.5 mL    11 May 2019 07:01  -  11 May 2019 07:55  --------------------------------------------------------  IN: 125 mL / OUT: 0 mL / NET: 125 mL        Physical Exam:  Appearance: [ ] Normal [ ] NAD  Eyes: [ ] PERRL [ ] EOMI  HENT: [ ] Normal oral muscosa [ ]NC/AT  Cardiovascular: [ ] S1 [ ] S2 [ ] RRR [ ] No m/r/g [ ]No edema [ ] JVP  Procedural Access Site: [ ] No hematoma [ ] Non-tender to palpation [ ] 2+ pulse [ ] No bruit [ ] No Ecchymosis  Respiratory: [ ] Clear to auscultation bilaterally  Gastrointestinal: [ ] Soft [ ] Non-tender [ ] Non-distended [ ] BS+  Musculoskeletal: [ ] No clubbing [ ] No joint deformity   Neurologic: [ ] Non-focal  Lymphatic: [ ] No lymphadenopathy  Psychiatry: [ ] AAOx3 [ ] Mood & affect appropriate  Skin: [ ] No rashes [ ] No ecchymoses [ ] No cyanosis        142  |  102  |  18  ----------------------------<  78  3.6   |  28  |  1.38<H>    Ca    8.6      11 May 2019 05:34  Phos  3.5       Mg     1.8         TPro  6.4  /  Alb  2.9<L>  /  TBili  1.3<H>  /  DBili  x   /  AST  15  /  ALT  10  /  AlkPhos  116  05-11    PT/INR - ( 10 May 2019 04:24 )   PT: 17.3 sec;   INR: 1.50 ratio         PTT - ( 10 May 2019 04:24 )  PTT:30.7 sec              ECG:    Echo:    Stress Testing:     Cath:    Imaging:    Interpretation of Telemetry: HPI:   69 yo Non-Hodgkin's Lymphoma tx w/ chemo in , DM2 w/ CKD stage 3, HTN, CHF EF 35% on cardiac cath 2016, pleural effusion s/p left pleuracentesis in 10/2016, cardiomyopathy, gout and HLD presents to the ED sent in from cardiologists office because of EKG changes on . Pt noted to be in 2nd degree w/ RBBB alternating w/ LBBB and was intubated for airway protection. ECHO severe MR. Pt was taken for RHC/LHC and shown to have elevated filling pressures and CAD however nonobstructive. Pt diuresed w/ lasix. On 5/10 pt underwent Micra PPM w/ VVI 60. On  pt was extubated.     Events: Pt extubated without complications. Diuretics held.     Review Of Systems:  Constitutional: denies fever, chills, Fatigue   HEENT: denies Blurred vision, Eye Pain, Headache   Respiratory: denies Cough, Wheezing , Shortness of breath  Cardiovascular: denies Chest Pain, Palpitations,  ARGUETA   Gastrointestinal: denies Abdominal Pain, Diarrhea, Constipation   Genitourinary: denies Nocturia, Dysuria, Incontinence  Extremities: denies Swelling, Joint Pain  Neurologic: denies Focal deficit, Paresthesias, Syncope  Lymphatic: denies Swelling, Lymphadenopathy   Skin: denies Rash, Ecchymoses, Wounds   Psychiatry: denies Depression, Suicidal/Homicidal Ideation, anxiety  [X ] 10 point review of systems is otherwise negative except as mentioned above         Medications:  allopurinol 300 milliGRAM(s) Oral daily  aspirin  chewable 81 milliGRAM(s) Oral daily  dextrose 40% Gel 15 Gram(s) Oral once PRN  dextrose 5%. 1000 milliLiter(s) IV Continuous <Continuous>  dextrose 50% Injectable 12.5 Gram(s) IV Push once  fentaNYL   Infusion. 0.5 MICROgram(s)/kG/Hr IV Continuous <Continuous>  folic acid 1 milliGRAM(s) Oral daily  furosemide   Injectable 40 milliGRAM(s) IV Push every 12 hours  glucagon  Injectable 1 milliGRAM(s) IntraMuscular once PRN  heparin  Injectable 5000 Unit(s) SubCutaneous every 12 hours  insulin lispro (HumaLOG) corrective regimen sliding scale   SubCutaneous three times a day before meals  midazolam Infusion 0.02 mG/kG/Hr IV Continuous <Continuous>  pantoprazole  Injectable 40 milliGRAM(s) IV Push at bedtime  petrolatum Ophthalmic Ointment 1 Application(s) Both EYES every 8 hours  piperacillin/tazobactam IVPB. 3.375 Gram(s) IV Intermittent every 8 hours  sodium chloride 0.9% lock flush 10 milliLiter(s) IV Push every 1 hour PRN  vancomycin  IVPB 1000 milliGRAM(s) IV Intermittent every 12 hours    Vitals:  T(C): 37.7 (19 @ 06:25), Max: 37.7 (05-10-19 @ 18:30)  HR: 60 (19 @ 07:30) (48 - 62)  BP: 163/87 (19 @ 07:30) (85/42 - 163/87)  BP(mean): 111 (19 @ 07:30) (58 - 115)  RR: 30 (19 @ 07:30) (0 - 30)  SpO2: 98% (19 @ 07:30) (94% - 100%)  Daily Height in cm: 182.88 (10 May 2019 14:09)    Daily Weight in k (10 May 2019 14:09)  I&O's Summary    10 May 2019 07:  -  11 May 2019 07:00  --------------------------------------------------------  IN: 1227.5 mL / OUT: 2800 mL / NET: -1572.5 mL    11 May 2019 07:01  -  11 May 2019 07:55  --------------------------------------------------------  IN: 125 mL / OUT: 0 mL / NET: 125 mL    Physical Exam:  Appearance: [x] Normal [x] NAD  Eyes: [x] PERRL [x]EOMI  HENT: [ ] Normal oral muscosa [ ]NC/AT  Cardiovascular: [ ] S1 [ ] S2 [ ] RRR [ ] No m/r/g [ ]No edema [ ] JVP  Procedural Access Site: [ ] No hematoma [ ] Non-tender to palpation [ ] 2+ pulse [ ] No bruit [ ] No Ecchymosis  Respiratory: [ ] Clear to auscultation bilaterally  Gastrointestinal: [ ] Soft [ ] Non-tender [ ] Non-distended [ ] BS+  Musculoskeletal: [ ] No clubbing [ ] No joint deformity   Neurologic: [ ] Non-focal  Lymphatic: [ ] No lymphadenopathy  Psychiatry: [ ] AAOx3 [ ] Mood & affect appropriate  Skin: [ ] No rashes [ ] No ecchymoses [ ] No cyanosis        142  |  102  |  18  ----------------------------<  78  3.6   |  28  |  1.38<H>    Ca    8.6      11 May 2019 05:34  Phos  3.5       Mg     1.8         TPro  6.4  /  Alb  2.9<L>  /  TBili  1.3<H>  /  DBili  x   /  AST  15  /  ALT  10  /  AlkPhos  116  05-11    PT/INR - ( 10 May 2019 04:24 )   PT: 17.3 sec;   INR: 1.50 ratio         PTT - ( 10 May 2019 04:24 )  PTT:30.7 sec              ECG:    Echo:    Stress Testing:     Cath:    Imaging:    Interpretation of Telemetry: HPI:   69 yo Non-Hodgkin's Lymphoma tx w/ chemo in , DM2 w/ CKD stage 3, HTN, CHF EF 35% on cardiac cath 2016, pleural effusion s/p left pleuracentesis in 10/2016, cardiomyopathy, gout and HLD presents to the ED sent in from cardiologists office because of EKG changes on . Pt noted to be in 2nd degree w/ RBBB alternating w/ LBBB and was intubated for airway protection. ECHO severe MR. Pt was taken for RHC/LHC and shown to have elevated filling pressures and CAD however nonobstructive. Pt diuresed w/ lasix. On 5/10 pt underwent Micra PPM w/ VVI 60. On  pt was extubated.     Events: Pt extubated without complications. Diuretics held.     Review Of Systems:  Constitutional: denies fever, chills, Fatigue   HEENT: denies Blurred vision, Eye Pain, Headache   Respiratory: denies Cough, Wheezing , Shortness of breath  Cardiovascular: denies Chest Pain, Palpitations,  ARGUETA   Gastrointestinal: denies Abdominal Pain, Diarrhea, Constipation   Genitourinary: denies Nocturia, Dysuria, Incontinence  Extremities: denies Swelling, Joint Pain  Neurologic: denies Focal deficit, Paresthesias, Syncope  Lymphatic: denies Swelling, Lymphadenopathy   Skin: denies Rash, Ecchymoses, Wounds   Psychiatry: denies Depression, Suicidal/Homicidal Ideation, anxiety  [X ] 10 point review of systems is otherwise negative except as mentioned above         Medications:  allopurinol 300 milliGRAM(s) Oral daily  aspirin  chewable 81 milliGRAM(s) Oral daily  dextrose 40% Gel 15 Gram(s) Oral once PRN  dextrose 5%. 1000 milliLiter(s) IV Continuous <Continuous>  dextrose 50% Injectable 12.5 Gram(s) IV Push once  fentaNYL   Infusion. 0.5 MICROgram(s)/kG/Hr IV Continuous <Continuous>  folic acid 1 milliGRAM(s) Oral daily  furosemide   Injectable 40 milliGRAM(s) IV Push every 12 hours  glucagon  Injectable 1 milliGRAM(s) IntraMuscular once PRN  heparin  Injectable 5000 Unit(s) SubCutaneous every 12 hours  insulin lispro (HumaLOG) corrective regimen sliding scale   SubCutaneous three times a day before meals  midazolam Infusion 0.02 mG/kG/Hr IV Continuous <Continuous>  pantoprazole  Injectable 40 milliGRAM(s) IV Push at bedtime  petrolatum Ophthalmic Ointment 1 Application(s) Both EYES every 8 hours  piperacillin/tazobactam IVPB. 3.375 Gram(s) IV Intermittent every 8 hours  sodium chloride 0.9% lock flush 10 milliLiter(s) IV Push every 1 hour PRN  vancomycin  IVPB 1000 milliGRAM(s) IV Intermittent every 12 hours    Vitals:  T(C): 37.7 (19 @ 06:25), Max: 37.7 (05-10-19 @ 18:30)  HR: 60 (19 @ 07:30) (48 - 62)  BP: 163/87 (19 @ 07:30) (85/42 - 163/87)  BP(mean): 111 (19 @ 07:30) (58 - 115)  RR: 30 (19 @ 07:30) (0 - 30)  SpO2: 98% (19 @ 07:30) (94% - 100%)  Daily Height in cm: 182.88 (10 May 2019 14:09)    Daily Weight in k (10 May 2019 14:09)  I&O's Summary    10 May 2019 07:  -  11 May 2019 07:00  --------------------------------------------------------  IN: 1227.5 mL / OUT: 2800 mL / NET: -1572.5 mL    11 May 2019 07:01  -  11 May 2019 07:55  --------------------------------------------------------  IN: 125 mL / OUT: 0 mL / NET: 125 mL    Physical Exam:  Appearance: [x] Normal [x] NAD  Eyes: [x] PERRL [x]EOMI  HENT: [ x] Normal oral muscosa [x ]NC/AT  Cardiovascular: [ x] S1 [ x] S2 [ x] RRR. Trace edema   Procedural Access Site:   Respiratory: [ ] Clear to auscultation bilaterally  Gastrointestinal: [ ] Soft [ ] Non-tender [ ] Non-distended [ ] BS+  Musculoskeletal: [ ] No clubbing [ ] No joint deformity   Neurologic: [ ] Non-focal  Lymphatic: [ ] No lymphadenopathy  Psychiatry: [ ] AAOx3 [ ] Mood & affect appropriate  Skin: [ ] No rashes [ ] No ecchymoses [ ] No cyanosis        142  |  102  |  18  ----------------------------<  78  3.6   |  28  |  1.38<H>    Ca    8.6      11 May 2019 05:34  Phos  3.5       Mg     1.8         TPro  6.4  /  Alb  2.9<L>  /  TBili  1.3<H>  /  DBili  x   /  AST  15  /  ALT  10  /  AlkPhos  116  05-11    PT/INR - ( 10 May 2019 04:24 )   PT: 17.3 sec;   INR: 1.50 ratio         PTT - ( 10 May 2019 04:24 )  PTT:30.7 sec              ECG:    Echo:    Stress Testing:     Cath:    Imaging:    Interpretation of Telemetry: HPI:   67 yo Non-Hodgkin's Lymphoma tx w/ chemo in , DM2 w/ CKD stage 3, HTN, CHF EF 35% on cardiac cath 2016, pleural effusion s/p left pleuracentesis in 10/2016, cardiomyopathy, gout and HLD presents to the ED sent in from cardiologists office because of EKG changes on . Pt noted to be in 2nd degree w/ RBBB alternating w/ LBBB and was intubated for airway protection. ECHO severe MR. Pt was taken for RHC/LHC and shown to have elevated filling pressures and CAD however nonobstructive. Pt diuresed w/ lasix. On 5/10 pt underwent Micra PPM w/ VVI 60. On  pt was extubated.     Events: Pt extubated without complications. Diuretics held.     Review Of Systems:  Constitutional: denies fever, chills, Fatigue   HEENT: denies Blurred vision, Eye Pain, Headache   Respiratory: denies Cough, Wheezing , Shortness of breath  Cardiovascular: denies Chest Pain, Palpitations,  ARGUETA   Gastrointestinal: denies Abdominal Pain, Diarrhea, Constipation   Genitourinary: denies Nocturia, Dysuria, Incontinence  Extremities: denies Swelling, Joint Pain  Neurologic: denies Focal deficit, Paresthesias, Syncope  Lymphatic: denies Swelling, Lymphadenopathy   Skin: denies Rash, Ecchymoses, Wounds   Psychiatry: denies Depression, Suicidal/Homicidal Ideation, anxiety  [X ] 10 point review of systems is otherwise negative except as mentioned above         Medications:  allopurinol 300 milliGRAM(s) Oral daily  aspirin  chewable 81 milliGRAM(s) Oral daily  dextrose 40% Gel 15 Gram(s) Oral once PRN  dextrose 5%. 1000 milliLiter(s) IV Continuous <Continuous>  dextrose 50% Injectable 12.5 Gram(s) IV Push once  fentaNYL   Infusion. 0.5 MICROgram(s)/kG/Hr IV Continuous <Continuous>  folic acid 1 milliGRAM(s) Oral daily  furosemide   Injectable 40 milliGRAM(s) IV Push every 12 hours  glucagon  Injectable 1 milliGRAM(s) IntraMuscular once PRN  heparin  Injectable 5000 Unit(s) SubCutaneous every 12 hours  insulin lispro (HumaLOG) corrective regimen sliding scale   SubCutaneous three times a day before meals  midazolam Infusion 0.02 mG/kG/Hr IV Continuous <Continuous>  pantoprazole  Injectable 40 milliGRAM(s) IV Push at bedtime  petrolatum Ophthalmic Ointment 1 Application(s) Both EYES every 8 hours  piperacillin/tazobactam IVPB. 3.375 Gram(s) IV Intermittent every 8 hours  sodium chloride 0.9% lock flush 10 milliLiter(s) IV Push every 1 hour PRN  vancomycin  IVPB 1000 milliGRAM(s) IV Intermittent every 12 hours    Vitals:  T(C): 37.7 (19 @ 06:25), Max: 37.7 (05-10-19 @ 18:30)  HR: 60 (19 @ 07:30) (48 - 62)  BP: 163/87 (19 @ 07:30) (85/42 - 163/87)  BP(mean): 111 (19 @ 07:30) (58 - 115)  RR: 30 (19 @ 07:30) (0 - 30)  SpO2: 98% (19 @ 07:30) (94% - 100%)  Daily Height in cm: 182.88 (10 May 2019 14:09)    Daily Weight in k (10 May 2019 14:09)  I&O's Summary    10 May 2019 07:  -  11 May 2019 07:00  --------------------------------------------------------  IN: 1227.5 mL / OUT: 2800 mL / NET: -1572.5 mL    11 May 2019 07:01  -  11 May 2019 07:55  --------------------------------------------------------  IN: 125 mL / OUT: 0 mL / NET: 125 mL    Physical Exam:  Appearance: [x] Normal [x] NAD  Eyes: [x] PERRL [x]EOMI  HENT: [ x] Normal oral muscosa [x ]NC/AT  Cardiovascular: [ x] S1 [ x] S2 [ x] RRR. Trace edema   Procedural Access Site: groin access site C/D/I without bleeding, induration, or hematoma.  Respiratory: [x ] Clear to auscultation bilaterally  Gastrointestinal: [x ] Soft [ x] Non-tender [x ] Non-distended  Musculoskeletal: [ x] No clubbing [x ] No joint deformity   Neurologic: [x ] Non-focal  Lymphatic: [x ] No lymphadenopathy  Psychiatry: [x ] AAOx3 [x ] Mood & affect appropriate  Skin: [ x] No rashes [x ] No ecchymoses [ x] No cyanosis        142  |  102  |  18  ----------------------------<  78  3.6   |  28  |  1.38<H>    Ca    8.6      11 May 2019 05:34  Phos  3.5       Mg     1.8         TPro  6.4  /  Alb  2.9<L>  /  TBili  1.3<H>  /  DBili  x   /  AST  15  /  ALT  10  /  AlkPhos  116  11    PT/INR - ( 10 May 2019 04:24 )   PT: 17.3 sec;   INR: 1.50 ratio      PTT - ( 10 May 2019 04:24 )  PTT:30.7 sec    ECG: Sinus Tachy w/ AV dissociation w/ occasional     Echo: < from: Transesophageal Echocardiogram w/o TTE (05.10.19 @ 10:32) >  1. Tethered mitral valve leaflets with normal opening.  Dilated mitral annlus. Severe mitral regurgitation.  MR ERO 0.65 cm sq  MR volume 79 ml.  2. Severe left atrial enlargement.    No left atrial or  left atrial appendage thrombus.  Decreased left atrial  appendage velocities noted.  3. Mild to moderate  left ventricular systolic dysfunction.  4. Severe right atrial enlargement.   A device wire is  noted in the right heart.  5. Right ventricular enlargement with decreased right  ventricular systolic function.  6. Normal tricuspid valve. Moderate tricuspid  regurgitation.  7. Estimated pulmonary artery systolic pressure equals 72  mm Hg, assuming right atrial pressure equals 8 mm Hg,  consistent with severe pulmonary pressures.  8. Left pleural effusion.    Cath: < from: Cardiac Cath Lab - Adult (05.10.19 @ 16:00) >  CORONARY VESSELS: The coronary circulation is right dominant.  LM:   --  LM: Normal.  LAD:   --  LAD: Angiography showed minor luminal irregularities with no  flow limiting lesions.  --  D1: There was a 70 % stenosis in the middle third of the vessel  segment.  CX:   --  Proximal circumflex: There was a 30 % stenosis.  --  Distal circumflex: There was a 60 % stenosis. There was a small  vascular territory distal to the lesion.  RCA:   --  Proximal RCA: There was a 40 % stenosis.  COMPLICATIONS: There were no complications.  DIAGNOSTIC IMPRESSIONS: Patient has severe pulmonary hypertension wih large  cv wave and mild CAD disease as descibed  DIAGNOSTIC RECOMMENDATIONS: surgical/ structiral heart team evaluation of  MR and evalution of worsening mental status    Imaging:   < from: Xray Chest 1 View- PORTABLE-Routine (19 @ 08:48) >  Lines and tubes and support devices are unchanged  The cardiomediastinal silhouette is within normal limits.  Loculated right pleural effusion unchanged  Stable trace left pleural effusion.  Interval improvement in pulmonary vascular congestive changes.  No pneumothorax.  No acute bony finding.    Interpretation of Telemetry:  V paced 60

## 2019-05-12 LAB
ALBUMIN SERPL ELPH-MCNC: 2.9 G/DL — LOW (ref 3.3–5)
ALBUMIN SERPL ELPH-MCNC: 3 G/DL — LOW (ref 3.3–5)
ALBUMIN SERPL ELPH-MCNC: 3.1 G/DL — LOW (ref 3.3–5)
ALP SERPL-CCNC: 108 U/L — SIGNIFICANT CHANGE UP (ref 40–120)
ALP SERPL-CCNC: 111 U/L — SIGNIFICANT CHANGE UP (ref 40–120)
ALP SERPL-CCNC: 115 U/L — SIGNIFICANT CHANGE UP (ref 40–120)
ALT FLD-CCNC: 10 U/L — SIGNIFICANT CHANGE UP (ref 10–45)
ALT FLD-CCNC: 9 U/L — LOW (ref 10–45)
ALT FLD-CCNC: 9 U/L — LOW (ref 10–45)
ANION GAP SERPL CALC-SCNC: 12 MMOL/L — SIGNIFICANT CHANGE UP (ref 5–17)
ANION GAP SERPL CALC-SCNC: 14 MMOL/L — SIGNIFICANT CHANGE UP (ref 5–17)
ANION GAP SERPL CALC-SCNC: 15 MMOL/L — SIGNIFICANT CHANGE UP (ref 5–17)
AST SERPL-CCNC: 12 U/L — SIGNIFICANT CHANGE UP (ref 10–40)
AST SERPL-CCNC: 12 U/L — SIGNIFICANT CHANGE UP (ref 10–40)
AST SERPL-CCNC: 15 U/L — SIGNIFICANT CHANGE UP (ref 10–40)
BASOPHILS # BLD AUTO: 0 K/UL — SIGNIFICANT CHANGE UP (ref 0–0.2)
BASOPHILS NFR BLD AUTO: 0.1 % — SIGNIFICANT CHANGE UP (ref 0–2)
BILIRUB SERPL-MCNC: 1.6 MG/DL — HIGH (ref 0.2–1.2)
BILIRUB SERPL-MCNC: 1.6 MG/DL — HIGH (ref 0.2–1.2)
BILIRUB SERPL-MCNC: 1.7 MG/DL — HIGH (ref 0.2–1.2)
BUN SERPL-MCNC: 16 MG/DL — SIGNIFICANT CHANGE UP (ref 7–23)
BUN SERPL-MCNC: 17 MG/DL — SIGNIFICANT CHANGE UP (ref 7–23)
BUN SERPL-MCNC: 18 MG/DL — SIGNIFICANT CHANGE UP (ref 7–23)
CALCIUM SERPL-MCNC: 8.4 MG/DL — SIGNIFICANT CHANGE UP (ref 8.4–10.5)
CALCIUM SERPL-MCNC: 8.6 MG/DL — SIGNIFICANT CHANGE UP (ref 8.4–10.5)
CALCIUM SERPL-MCNC: 8.9 MG/DL — SIGNIFICANT CHANGE UP (ref 8.4–10.5)
CHLORIDE SERPL-SCNC: 98 MMOL/L — SIGNIFICANT CHANGE UP (ref 96–108)
CHLORIDE SERPL-SCNC: 99 MMOL/L — SIGNIFICANT CHANGE UP (ref 96–108)
CHLORIDE SERPL-SCNC: 99 MMOL/L — SIGNIFICANT CHANGE UP (ref 96–108)
CO2 SERPL-SCNC: 27 MMOL/L — SIGNIFICANT CHANGE UP (ref 22–31)
CO2 SERPL-SCNC: 28 MMOL/L — SIGNIFICANT CHANGE UP (ref 22–31)
CO2 SERPL-SCNC: 28 MMOL/L — SIGNIFICANT CHANGE UP (ref 22–31)
CREAT SERPL-MCNC: 1.42 MG/DL — HIGH (ref 0.5–1.3)
CREAT SERPL-MCNC: 1.42 MG/DL — HIGH (ref 0.5–1.3)
CREAT SERPL-MCNC: 1.43 MG/DL — HIGH (ref 0.5–1.3)
EOSINOPHIL # BLD AUTO: 0.1 K/UL — SIGNIFICANT CHANGE UP (ref 0–0.5)
EOSINOPHIL NFR BLD AUTO: 1.5 % — SIGNIFICANT CHANGE UP (ref 0–6)
GLUCOSE BLDC GLUCOMTR-MCNC: 171 MG/DL — HIGH (ref 70–99)
GLUCOSE SERPL-MCNC: 121 MG/DL — HIGH (ref 70–99)
GLUCOSE SERPL-MCNC: 157 MG/DL — HIGH (ref 70–99)
GLUCOSE SERPL-MCNC: 173 MG/DL — HIGH (ref 70–99)
HCT VFR BLD CALC: 34.2 % — LOW (ref 39–50)
HGB BLD-MCNC: 11 G/DL — LOW (ref 13–17)
LACTATE SERPL-SCNC: 1 MMOL/L — SIGNIFICANT CHANGE UP (ref 0.7–2)
LACTATE SERPL-SCNC: 1.3 MMOL/L — SIGNIFICANT CHANGE UP (ref 0.7–2)
LYMPHOCYTES # BLD AUTO: 0.8 K/UL — LOW (ref 1–3.3)
LYMPHOCYTES # BLD AUTO: 8.9 % — LOW (ref 13–44)
MAGNESIUM SERPL-MCNC: 1.7 MG/DL — SIGNIFICANT CHANGE UP (ref 1.6–2.6)
MAGNESIUM SERPL-MCNC: 2 MG/DL — SIGNIFICANT CHANGE UP (ref 1.6–2.6)
MAGNESIUM SERPL-MCNC: 2.2 MG/DL — SIGNIFICANT CHANGE UP (ref 1.6–2.6)
MCHC RBC-ENTMCNC: 28.5 PG — SIGNIFICANT CHANGE UP (ref 27–34)
MCHC RBC-ENTMCNC: 32.2 GM/DL — SIGNIFICANT CHANGE UP (ref 32–36)
MCV RBC AUTO: 88.7 FL — SIGNIFICANT CHANGE UP (ref 80–100)
MONOCYTES # BLD AUTO: 0.6 K/UL — SIGNIFICANT CHANGE UP (ref 0–0.9)
MONOCYTES NFR BLD AUTO: 6.6 % — SIGNIFICANT CHANGE UP (ref 2–14)
NEUTROPHILS # BLD AUTO: 7.3 K/UL — SIGNIFICANT CHANGE UP (ref 1.8–7.4)
NEUTROPHILS NFR BLD AUTO: 82.9 % — HIGH (ref 43–77)
PHOSPHATE SERPL-MCNC: 2.8 MG/DL — SIGNIFICANT CHANGE UP (ref 2.5–4.5)
PHOSPHATE SERPL-MCNC: 3.5 MG/DL — SIGNIFICANT CHANGE UP (ref 2.5–4.5)
PHOSPHATE SERPL-MCNC: 3.6 MG/DL — SIGNIFICANT CHANGE UP (ref 2.5–4.5)
PLATELET # BLD AUTO: 157 K/UL — SIGNIFICANT CHANGE UP (ref 150–400)
POTASSIUM SERPL-MCNC: 3.5 MMOL/L — SIGNIFICANT CHANGE UP (ref 3.5–5.3)
POTASSIUM SERPL-MCNC: 3.7 MMOL/L — SIGNIFICANT CHANGE UP (ref 3.5–5.3)
POTASSIUM SERPL-MCNC: 3.9 MMOL/L — SIGNIFICANT CHANGE UP (ref 3.5–5.3)
POTASSIUM SERPL-SCNC: 3.5 MMOL/L — SIGNIFICANT CHANGE UP (ref 3.5–5.3)
POTASSIUM SERPL-SCNC: 3.7 MMOL/L — SIGNIFICANT CHANGE UP (ref 3.5–5.3)
POTASSIUM SERPL-SCNC: 3.9 MMOL/L — SIGNIFICANT CHANGE UP (ref 3.5–5.3)
PROT SERPL-MCNC: 6.5 G/DL — SIGNIFICANT CHANGE UP (ref 6–8.3)
PROT SERPL-MCNC: 6.5 G/DL — SIGNIFICANT CHANGE UP (ref 6–8.3)
PROT SERPL-MCNC: 6.7 G/DL — SIGNIFICANT CHANGE UP (ref 6–8.3)
RBC # BLD: 3.85 M/UL — LOW (ref 4.2–5.8)
RBC # FLD: 15.2 % — HIGH (ref 10.3–14.5)
SODIUM SERPL-SCNC: 138 MMOL/L — SIGNIFICANT CHANGE UP (ref 135–145)
SODIUM SERPL-SCNC: 141 MMOL/L — SIGNIFICANT CHANGE UP (ref 135–145)
SODIUM SERPL-SCNC: 141 MMOL/L — SIGNIFICANT CHANGE UP (ref 135–145)
WBC # BLD: 8.8 K/UL — SIGNIFICANT CHANGE UP (ref 3.8–10.5)
WBC # FLD AUTO: 8.8 K/UL — SIGNIFICANT CHANGE UP (ref 3.8–10.5)

## 2019-05-12 PROCEDURE — 71045 X-RAY EXAM CHEST 1 VIEW: CPT | Mod: 26

## 2019-05-12 RX ORDER — ISOSORBIDE DINITRATE 5 MG/1
20 TABLET ORAL EVERY 8 HOURS
Refills: 0 | Status: DISCONTINUED | OUTPATIENT
Start: 2019-05-12 | End: 2019-05-12

## 2019-05-12 RX ORDER — ISOSORBIDE DINITRATE 5 MG/1
40 TABLET ORAL EVERY 8 HOURS
Refills: 0 | Status: DISCONTINUED | OUTPATIENT
Start: 2019-05-12 | End: 2019-05-24

## 2019-05-12 RX ORDER — HYDRALAZINE HCL 50 MG
75 TABLET ORAL EVERY 8 HOURS
Refills: 0 | Status: DISCONTINUED | OUTPATIENT
Start: 2019-05-12 | End: 2019-05-13

## 2019-05-12 RX ORDER — HYDRALAZINE HCL 50 MG
25 TABLET ORAL ONCE
Refills: 0 | Status: COMPLETED | OUTPATIENT
Start: 2019-05-12 | End: 2019-05-12

## 2019-05-12 RX ORDER — ISOSORBIDE DINITRATE 5 MG/1
10 TABLET ORAL ONCE
Refills: 0 | Status: COMPLETED | OUTPATIENT
Start: 2019-05-12 | End: 2019-05-12

## 2019-05-12 RX ORDER — POTASSIUM CHLORIDE 20 MEQ
20 PACKET (EA) ORAL
Refills: 0 | Status: COMPLETED | OUTPATIENT
Start: 2019-05-12 | End: 2019-05-12

## 2019-05-12 RX ORDER — MAGNESIUM SULFATE 500 MG/ML
2 VIAL (ML) INJECTION ONCE
Refills: 0 | Status: COMPLETED | OUTPATIENT
Start: 2019-05-12 | End: 2019-05-12

## 2019-05-12 RX ORDER — POTASSIUM CHLORIDE 20 MEQ
20 PACKET (EA) ORAL ONCE
Refills: 0 | Status: COMPLETED | OUTPATIENT
Start: 2019-05-12 | End: 2019-05-12

## 2019-05-12 RX ORDER — HYDRALAZINE HCL 50 MG
50 TABLET ORAL EVERY 8 HOURS
Refills: 0 | Status: DISCONTINUED | OUTPATIENT
Start: 2019-05-12 | End: 2019-05-12

## 2019-05-12 RX ORDER — SENNA PLUS 8.6 MG/1
1 TABLET ORAL ONCE
Refills: 0 | Status: COMPLETED | OUTPATIENT
Start: 2019-05-12 | End: 2019-05-12

## 2019-05-12 RX ORDER — LANOLIN ALCOHOL/MO/W.PET/CERES
3 CREAM (GRAM) TOPICAL ONCE
Refills: 0 | Status: COMPLETED | OUTPATIENT
Start: 2019-05-12 | End: 2019-05-12

## 2019-05-12 RX ORDER — HEPARIN SODIUM 5000 [USP'U]/ML
5000 INJECTION INTRAVENOUS; SUBCUTANEOUS EVERY 8 HOURS
Refills: 0 | Status: DISCONTINUED | OUTPATIENT
Start: 2019-05-12 | End: 2019-05-24

## 2019-05-12 RX ORDER — ISOSORBIDE DINITRATE 5 MG/1
30 TABLET ORAL EVERY 8 HOURS
Refills: 0 | Status: DISCONTINUED | OUTPATIENT
Start: 2019-05-12 | End: 2019-05-12

## 2019-05-12 RX ADMIN — Medication 25 MILLIGRAM(S): at 17:18

## 2019-05-12 RX ADMIN — Medication 25 MILLIGRAM(S): at 05:01

## 2019-05-12 RX ADMIN — Medication 5 MICROGRAM(S)/MIN: at 10:13

## 2019-05-12 RX ADMIN — Medication 100 MILLIEQUIVALENT(S): at 05:43

## 2019-05-12 RX ADMIN — Medication 50 MILLIGRAM(S): at 13:43

## 2019-05-12 RX ADMIN — Medication 1: at 15:00

## 2019-05-12 RX ADMIN — ISOSORBIDE DINITRATE 40 MILLIGRAM(S): 5 TABLET ORAL at 21:35

## 2019-05-12 RX ADMIN — ISOSORBIDE DINITRATE 20 MILLIGRAM(S): 5 TABLET ORAL at 05:43

## 2019-05-12 RX ADMIN — ISOSORBIDE DINITRATE 10 MILLIGRAM(S): 5 TABLET ORAL at 10:14

## 2019-05-12 RX ADMIN — PIPERACILLIN AND TAZOBACTAM 25 GRAM(S): 4; .5 INJECTION, POWDER, LYOPHILIZED, FOR SOLUTION INTRAVENOUS at 05:01

## 2019-05-12 RX ADMIN — SENNA PLUS 1 TABLET(S): 8.6 TABLET ORAL at 03:05

## 2019-05-12 RX ADMIN — CHLORHEXIDINE GLUCONATE 1 APPLICATION(S): 213 SOLUTION TOPICAL at 05:42

## 2019-05-12 RX ADMIN — Medication 81 MILLIGRAM(S): at 10:14

## 2019-05-12 RX ADMIN — ISOSORBIDE DINITRATE 10 MILLIGRAM(S): 5 TABLET ORAL at 17:18

## 2019-05-12 RX ADMIN — PIPERACILLIN AND TAZOBACTAM 25 GRAM(S): 4; .5 INJECTION, POWDER, LYOPHILIZED, FOR SOLUTION INTRAVENOUS at 21:36

## 2019-05-12 RX ADMIN — Medication 25 MILLIGRAM(S): at 10:14

## 2019-05-12 RX ADMIN — ISOSORBIDE DINITRATE 30 MILLIGRAM(S): 5 TABLET ORAL at 13:43

## 2019-05-12 RX ADMIN — Medication 3 MILLIGRAM(S): at 21:35

## 2019-05-12 RX ADMIN — Medication 100 MILLIEQUIVALENT(S): at 01:02

## 2019-05-12 RX ADMIN — Medication 1 MILLIGRAM(S): at 10:13

## 2019-05-12 RX ADMIN — Medication 100 MILLIEQUIVALENT(S): at 10:13

## 2019-05-12 RX ADMIN — PIPERACILLIN AND TAZOBACTAM 25 GRAM(S): 4; .5 INJECTION, POWDER, LYOPHILIZED, FOR SOLUTION INTRAVENOUS at 13:43

## 2019-05-12 RX ADMIN — Medication 1: at 18:24

## 2019-05-12 RX ADMIN — HEPARIN SODIUM 5000 UNIT(S): 5000 INJECTION INTRAVENOUS; SUBCUTANEOUS at 13:44

## 2019-05-12 RX ADMIN — HEPARIN SODIUM 5000 UNIT(S): 5000 INJECTION INTRAVENOUS; SUBCUTANEOUS at 05:00

## 2019-05-12 RX ADMIN — Medication 300 MILLIGRAM(S): at 10:14

## 2019-05-12 RX ADMIN — HEPARIN SODIUM 5000 UNIT(S): 5000 INJECTION INTRAVENOUS; SUBCUTANEOUS at 22:20

## 2019-05-12 RX ADMIN — ISOSORBIDE DINITRATE 10 MILLIGRAM(S): 5 TABLET ORAL at 01:01

## 2019-05-12 RX ADMIN — Medication 50 GRAM(S): at 01:02

## 2019-05-12 RX ADMIN — Medication 75 MILLIGRAM(S): at 21:36

## 2019-05-12 NOTE — PROGRESS NOTE ADULT - SUBJECTIVE AND OBJECTIVE BOX
Patient is a 68y old  Male who presents with a chief complaint of confusion (08 May 2019 12:11)      HPI:  ** Patient poor historian **    Patient is a 68 year old Non-Hodgkin's Lymphoma tx with chemotherapy in , DM2 with CKD stage 3, HTN, CHF EF 35% on cardiac cath 2016, pleural effusion s/p left pleuracentesis in 10/2016, cardiomyopathy, gout and HLD presents to the ED sent in from cardiologists office because of EKG changes. Patient is not sure why he is here. He was not sure where he was, why he is here or what even the year is. He remarks that he retired a few months ago and he stopped taking his medications then because he "wasn't feeling good". He states he was wife his wife earlier but then later he states shes in [place]. Patient currently denies chest pain, shortness of breath, palpitations, syncope, fevers, chills, recent travel or sick contacts. No new meds however he has stopped taking most of his meds. He overall, feels fine and is not sure why he is in the hospital.     It's very difficult to get a clear history from patient. I have tried to reach his daughter but have not received a call back as of yet. I have called [facility] patients pharmacy and he states patient picked up furosemide and irbersartan in April but has not picked up any other meds in months. Mentation in  AAOX3.    In the ED, VSS. Labs at baseline, CT head with old stroke, Trops elevated and peaked at 50, now normal, EKG with TWI in lateral leads. (08 May 2019 10:33)    5 as the above record indicates, the pt is here and is a very poor historian. I was asked by Dr Hawkins from oncology and by the pt's internist to see the pt as he did have a past of lymphoma. The oncology office will have to look up records as his history is not readily available. When I came to see the pt he was not able to supply much info about his cancer other than he had lymphoma and did not know the type or who treated him. Dr Hawkins thinks he may have been treated by his previous associate, Dr Sarmiento.     At the time of my visit the pt was alert and oriented but was not able to give specific details about any of his medical issues. His chemistries appeared all unremarkable and he was not febrile and his ct of the head showed nothing of note.  I will see if there are any records on his previous onc history and see if there is any reason to think it may have contributed to his present admit here.  events of last day was noted and pt was intubated and had heart failure. The records were reviewed at [facility] and pt in  had a large cell lymphoma and was treated with R CHOP. In  he went to [facility] [place] and was treated for a recurrence with BR and he has remained in remission. 5/10 still intubated and sedated and labs reviewed and thus far no direct evidence for lymphoma recurrence.  Pt still intubated and sedated. Anemia from icu anemia causes no evidence for recurrent lymphoma.  the pt was extubated on this date and hemoglobin was noted to be 11.0 will look when stable for any evidenced of recurrent lymphoma.    MEDICATIONS  (STANDING):  allopurinol 300 milliGRAM(s) Oral daily  aspirin  chewable 81 milliGRAM(s) Oral daily  chlorhexidine 4% Liquid 1 Application(s) Topical <User Schedule>  dextrose 5%. 1000 milliLiter(s) (50 mL/Hr) IV Continuous <Continuous>  dextrose 50% Injectable 12.5 Gram(s) IV Push once  folic acid 1 milliGRAM(s) Oral daily  heparin  Injectable 5000 Unit(s) SubCutaneous every 8 hours  hydrALAZINE 50 milliGRAM(s) Oral every 8 hours  insulin lispro (HumaLOG) corrective regimen sliding scale   SubCutaneous three times a day before meals  isosorbide   dinitrate Tablet (ISORDIL) 30 milliGRAM(s) Oral every 8 hours  nitroglycerin  Infusion 16.667 MICROgram(s)/Min (5 mL/Hr) IV Continuous <Continuous>  piperacillin/tazobactam IVPB. 3.375 Gram(s) IV Intermittent every 8 hours    LABS:                                      11.0   8.8   )-----------( 157      ( 12 May 2019 05:10 )   05-12    138  |  98  |  16  ----------------------------<  121<H>  3.5   |  28  |  1.42<H>    Ca    8.6      12 May 2019 05:10  Phos  3.6     05-12  Mg     2.2     05-12    TPro  6.5  /  Alb  3.0<L>  /  TBili  1.6<H>  /  DBili  x   /  AST  12  /  ALT  9<L>  /  AlkPhos  108  05-12    Urinalysis Basic - ( 08 May 2019 00:17 )    Color: Yellow / Appearance: Slightly Turbid / S.023 / pH: x  Gluc: x / Ketone: Negative  / Bili: Small / Urobili: 3 mg/dL   Blood: x / Protein: 300 mg/dL / Nitrite: Negative   Leuk Esterase: Negative / RBC: 2 /hpf / WBC 7 /HPF   Sq Epi: x / Non Sq Epi: 1 /hpf / Bacteria: Negative        ROS:  Negative except for:    PAST MEDICAL & SURGICAL HISTORY:  Pleural effusion  Moderate mitral regurgitation  Systolic heart failure  Rhinitis, allergic  Essential hypertension  DM type 2 (diabetes mellitus, type 2)  Non-Hodgkins Lymphoma  Non-Hodgkin lymphoma: h/o axillary dissection      SOCIAL HISTORY:    FAMILY HISTORY:  Family history of non-Hodgkin's lymphoma (Sibling)  Family history of CHF (congestive heart failure)      Allergies    No Known Allergies    Intolerances        PHYSICAL EXAM  General: extubated and was being cared for by the staff upon my arrival  HEENT: stable   Neck: supple  CV: rr   Lungs: decreased breath sounds at the bases  Abdomen: soft non-tender non-distended, no hepatosplenomegaly  Ext: no clubbing cyanosis or edema  Skin: no rashes and no petechiae  Neuro: alert and oriented X3 no focal deficits    BLOOD SMEAR INTERPRETATION:    RADIOLOGY :

## 2019-05-12 NOTE — PROGRESS NOTE ADULT - SUBJECTIVE AND OBJECTIVE BOX
Patient is a 68y old  Male who presents with a chief complaint of confusion (12 May 2019 11:25)      INTERVAL HPI/OVERNIGHT EVENTS: got extubated, NAD  remains confused      Vital Signs Last 24 Hrs  T(C): 36.9 (12 May 2019 08:00), Max: 37.3 (11 May 2019 17:00)  T(F): 98.5 (12 May 2019 08:00), Max: 99.1 (11 May 2019 17:00)  HR: 60 (12 May 2019 12:00) (58 - 62)  BP: 158/57 (12 May 2019 12:00) (126/52 - 187/60)  BP(mean): 91 (12 May 2019 12:00) (72 - 124)  RR: 18 (12 May 2019 12:00) (9 - 44)  SpO2: 99% (12 May 2019 12:00) (88% - 100%)    ALBUTerol/ipratropium for Nebulization 3 milliLiter(s) Nebulizer every 6 hours PRN  allopurinol 300 milliGRAM(s) Oral daily  aspirin  chewable 81 milliGRAM(s) Oral daily  chlorhexidine 4% Liquid 1 Application(s) Topical <User Schedule>  dextrose 40% Gel 15 Gram(s) Oral once PRN  dextrose 5%. 1000 milliLiter(s) IV Continuous <Continuous>  dextrose 50% Injectable 12.5 Gram(s) IV Push once  folic acid 1 milliGRAM(s) Oral daily  glucagon  Injectable 1 milliGRAM(s) IntraMuscular once PRN  heparin  Injectable 5000 Unit(s) SubCutaneous every 8 hours  hydrALAZINE 50 milliGRAM(s) Oral every 8 hours  insulin lispro (HumaLOG) corrective regimen sliding scale   SubCutaneous three times a day before meals  isosorbide   dinitrate Tablet (ISORDIL) 30 milliGRAM(s) Oral every 8 hours  nitroglycerin  Infusion 16.667 MICROgram(s)/Min IV Continuous <Continuous>  piperacillin/tazobactam IVPB. 3.375 Gram(s) IV Intermittent every 8 hours  sodium chloride 0.9% lock flush 10 milliLiter(s) IV Push every 1 hour PRN      PHYSICAL EXAM:  GENERAL: NAD,   EYES: conjunctiva and sclera clear  ENMT: Moist mucous membranes  NECK: Supple, No JVD, Normal thyroid  NERVOUS SYSTEM:  Alert & Oriented X0,   CHEST/LUNG: Clear to auscultation bilaterally ant  HEART: Regular rate and rhythm; No murmurs, rubs, or gallops  ABDOMEN: Soft, Nontender, Nondistended; Bowel sounds present  EXTREMITIES:  2+ Peripheral Pulses, No clubbing, cyanosis, or edema  LYMPH: No lymphadenopathy noted  SKIN: No rashes or lesions    Consultant(s) Notes Reviewed:  [x ] YES  [ ] NO  Care Discussed with Consultants/Other Providers [ x] YES  [ ] NO    LABS:                        11.0   8.8   )-----------( 157      ( 12 May 2019 05:10 )             34.2     05-12    138  |  98  |  16  ----------------------------<  121<H>  3.5   |  28  |  1.42<H>    Ca    8.6      12 May 2019 05:10  Phos  3.6     05-12  Mg     2.2     05-12    TPro  6.5  /  Alb  3.0<L>  /  TBili  1.6<H>  /  DBili  x   /  AST  12  /  ALT  9<L>  /  AlkPhos  108  05-12        CAPILLARY BLOOD GLUCOSE          ABG - ( 11 May 2019 15:52 )  pH, Arterial: 7.46  pH, Blood: x     /  pCO2: 43    /  pO2: 88    / HCO3: 30    / Base Excess: 5.6   /  SaO2: 98                    RADIOLOGY & ADDITIONAL TESTS:    Imaging Personally Reviewed:  [x ] YES  [ ] NO

## 2019-05-12 NOTE — PROGRESS NOTE ADULT - SUBJECTIVE AND OBJECTIVE BOX
====================  CCU MIDNIGHT ROUNDS  ====================    NIK GRIMM  [id number]  Patient is a 68y old  Male who presents with a chief complaint of confusion (12 May 2019 13:27)      ====================  SUMMARY: 69 yo Non-Hodgkin's Lymphoma tx w/ chemo in 2004, DM2 w/ CKD stage 3, HTN, CHF EF 35% on cardiac cath 12/ 2016, pleural effusion s/p left pleuracentesis in 10/2016, cardiomyopathy, gout and HLD presents to the ED sent in from cardiologists office because of EKG changes on 5/8. Pt noted to be in 2nd degree w/ RBBB alternating w/ LBBB and was intubated for airway protection. ECHO severe MR. Pt was taken for RHC/LHC and shown to have elevated filling pressures and CAD however nonobstructive. Pt diuresed w/ lasix. On 5/10 pt underwent Micra PPM w/ VVI 60. On 5/11 pt was extubated.   ====================      ====================  NEW EVENTS: Remains on 1:1 observation requiring frequent orientation. Remains on nitro gtt, titrate down and titrate up PO meds.   ====================    MEDICATIONS  (STANDING):  allopurinol 300 milliGRAM(s) Oral daily  aspirin  chewable 81 milliGRAM(s) Oral daily  chlorhexidine 4% Liquid 1 Application(s) Topical <User Schedule>  dextrose 5%. 1000 milliLiter(s) (50 mL/Hr) IV Continuous <Continuous>  dextrose 50% Injectable 12.5 Gram(s) IV Push once  folic acid 1 milliGRAM(s) Oral daily  heparin  Injectable 5000 Unit(s) SubCutaneous every 8 hours  hydrALAZINE 75 milliGRAM(s) Oral every 8 hours  insulin lispro (HumaLOG) corrective regimen sliding scale   SubCutaneous three times a day before meals  isosorbide   dinitrate Tablet (ISORDIL) 40 milliGRAM(s) Oral every 8 hours  nitroglycerin  Infusion 16.667 MICROgram(s)/Min (5 mL/Hr) IV Continuous <Continuous>  piperacillin/tazobactam IVPB. 3.375 Gram(s) IV Intermittent every 8 hours    MEDICATIONS  (PRN):  ALBUTerol/ipratropium for Nebulization 3 milliLiter(s) Nebulizer every 6 hours PRN Bronchospasm  dextrose 40% Gel 15 Gram(s) Oral once PRN Blood Glucose LESS THAN 70 milliGRAM(s)/deciliter  glucagon  Injectable 1 milliGRAM(s) IntraMuscular once PRN Glucose LESS THAN 70 milligrams/deciliter  sodium chloride 0.9% lock flush 10 milliLiter(s) IV Push every 1 hour PRN Pre/post blood products, medications, blood draw, and to maintain line patency      ====================  VITALS (Last 12 hrs):  ====================    T(C): 36.8 (05-12-19 @ 19:00), Max: 36.8 (05-12-19 @ 14:00)  T(F): 98.3 (05-12-19 @ 19:00), Max: 98.3 (05-12-19 @ 19:00)  HR: 60 (05-12-19 @ 22:38) (58 - 62)  BP: 155/50 (05-12-19 @ 22:38) (139/55 - 181/63)  BP(mean): 114 (05-12-19 @ 22:38) (75 - 132)  RR: 19 (05-12-19 @ 22:38) (14 - 37)  SpO2: 93% (05-12-19 @ 22:38) (82% - 100%)  CVP(mm Hg): 7 (05-12-19 @ 14:15) (3 - 7)      I&O's Summary    11 May 2019 07:01  -  12 May 2019 07:00  --------------------------------------------------------  IN: 1356.5 mL / OUT: 3535 mL / NET: -2178.5 mL    12 May 2019 07:01  -  12 May 2019 23:09  --------------------------------------------------------  IN: 598 mL / OUT: 415 mL / NET: 183 mL    ====================  NEW LABS:  ====================      05-12    141  |  99  |  17  ----------------------------<  157<H>  3.9   |  28  |  1.43<H>    Ca    8.9      12 May 2019 17:39  Phos  2.8     05-12  Mg     2.0     05-12    TPro  6.7  /  Alb  3.1<L>  /  TBili  1.7<H>  /  DBili  x   /  AST  12  /  ALT  10  /  AlkPhos  115  05-12          ABG - ( 11 May 2019 15:52 )  pH, Arterial: 7.46  pH, Blood: x     /  pCO2: 43    /  pO2: 88    / HCO3: 30    / Base Excess: 5.6   /  SaO2: 98          ====================  PLAN:  ====================  #Neuro  AMS  -A+O x1 to person  -CTH 5/7 shows chronic right parietal lobe infarct  -MRI head 5/8 shows encephalomalacia + gliosis with traces of old blood products  -would hold off on haldol if pt gets agitated due to QTc >600ms  -1:1 observation, reoriented pt  -safety precautions    #Cardiac  CHB s/p micra PPM  -currently paced rate 60bpm  -holding AV node blockers at this time   -will need to f/u EP after discharge    Severe MR   -eval in progress by CT surgery, Dr. Teresa  -omar IVP PRN, strict I+O and daily weights    HTN  -wean off nitro gtt as tolerated by BP  -c/w hydralazine 75 TID, up titrated as tolerated  -c/w isosobide 40mg TID, up titrate as tolerated    #Renal   STEPHEN on CKD stage III  -Trending BUN/SCr, SCr 1.44 with peak @ 1.82  -avoid nephrotoxic meds, renally dose    #ID  Febrile T max 101.1 5/9  -c/w zosyn 3.375mg q8hrs 5/9  -off vancomycin 5/9-5/11  -blood cultures 5/10 ngtd, 5/9 ngtd      Inna Melara CCU NP  Beeper #[id number]  Spectra # [phone]

## 2019-05-12 NOTE — PROGRESS NOTE ADULT - SUBJECTIVE AND OBJECTIVE BOX
Events:    Review Of Systems:  Constitutional: denies fever, chills, Fatigue   HEENT: denies Blurred vision, Eye Pain, Headache   Respiratory: denies Cough, Wheezing , Shortness of breath  Cardiovascular: denies Chest Pain, Palpitations,  ARGUETA   Gastrointestinal: denies Abdominal Pain, Diarrhea, Constipation   Genitourinary: denies Nocturia, Dysuria, Incontinence  Extremities: denies Swelling, Joint Pain  Neurologic: denies Focal deficit, Paresthesias, Syncope  Lymphatic: denies Swelling, Lymphadenopathy   Skin: denies Rash, Ecchymoses, Wounds   Psychiatry: denies Depression, Suicidal/Homicidal Ideation, anxiety  [X ] 10 point review of systems is otherwise negative except as mentioned above         Medications:  ALBUTerol/ipratropium for Nebulization 3 milliLiter(s) Nebulizer every 6 hours PRN  allopurinol 300 milliGRAM(s) Oral daily  aspirin  chewable 81 milliGRAM(s) Oral daily  chlorhexidine 4% Liquid 1 Application(s) Topical <User Schedule>  dextrose 40% Gel 15 Gram(s) Oral once PRN  dextrose 5%. 1000 milliLiter(s) IV Continuous <Continuous>  dextrose 50% Injectable 12.5 Gram(s) IV Push once  folic acid 1 milliGRAM(s) Oral daily  glucagon  Injectable 1 milliGRAM(s) IntraMuscular once PRN  heparin  Injectable 5000 Unit(s) SubCutaneous every 12 hours  hydrALAZINE 25 milliGRAM(s) Oral every 8 hours  insulin lispro (HumaLOG) corrective regimen sliding scale   SubCutaneous three times a day before meals  isosorbide   dinitrate Tablet (ISORDIL) 20 milliGRAM(s) Oral every 8 hours  nitroglycerin  Infusion 16.667 MICROgram(s)/Min IV Continuous <Continuous>  piperacillin/tazobactam IVPB. 3.375 Gram(s) IV Intermittent every 8 hours  potassium chloride  20 mEq/100 mL IVPB 20 milliEquivalent(s) IV Intermittent every 2 hours  sodium chloride 0.9% lock flush 10 milliLiter(s) IV Push every 1 hour PRN  vancomycin  IVPB 1000 milliGRAM(s) IV Intermittent every 12 hours    PMH/PSH/FH/SH: [ ] Unchanged  Vitals:  T(C): 36.8 (05-12-19 @ 06:00), Max: 37.6 (05-11-19 @ 09:30)  HR: 60 (05-12-19 @ 06:45) (58 - 62)  BP: 141/66 (05-12-19 @ 06:45) (110/52 - 187/60)  BP(mean): 98 (05-12-19 @ 06:45) (72 - 124)  RR: 18 (05-12-19 @ 06:45) (9 - 38)  SpO2: 98% (05-12-19 @ 06:45) (91% - 100%)  Wt(kg): --  Daily     Daily   I&O's Summary    11 May 2019 07:01  -  12 May 2019 07:00  --------------------------------------------------------  IN: 1356.5 mL / OUT: 3535 mL / NET: -2178.5 mL        Physical Exam:  Appearance: [ ] Normal [ ] NAD  Eyes: [ ] PERRL [ ] EOMI  HENT: [ ] Normal oral muscosa [ ]NC/AT  Cardiovascular: [ ] S1 [ ] S2 [ ] RRR [ ] No m/r/g [ ]No edema [ ] JVP  Procedural Access Site: [ ] No hematoma [ ] Non-tender to palpation [ ] 2+ pulse [ ] No bruit [ ] No Ecchymosis  Respiratory: [ ] Clear to auscultation bilaterally  Gastrointestinal: [ ] Soft [ ] Non-tender [ ] Non-distended [ ] BS+  Musculoskeletal: [ ] No clubbing [ ] No joint deformity   Neurologic: [ ] Non-focal  Lymphatic: [ ] No lymphadenopathy  Psychiatry: [ ] AAOx3 [ ] Mood & affect appropriate  Skin: [ ] No rashes [ ] No ecchymoses [ ] No cyanosis    05-12    138  |  98  |  16  ----------------------------<  121<H>  3.5   |  28  |  1.42<H>    Ca    8.6      12 May 2019 05:10  Phos  3.6     05-12  Mg     2.2     05-12    TPro  6.5  /  Alb  3.0<L>  /  TBili  1.6<H>  /  DBili  x   /  AST  12  /  ALT  9<L>  /  AlkPhos  108  05-12                  ECG:    Echo:    Stress Testing:     Cath:    Imaging:    Interpretation of Telemetry:

## 2019-05-12 NOTE — PROGRESS NOTE ADULT - ASSESSMENT
5/8 as the above record indicates, the pt is here and is a very poor historian. I was asked by Dr Hawkins from oncology and by the pt's internist to see the pt as he did have a past of lymphoma. The oncology office will have to look up records as his history is not readily available. When I came to see the pt he was not able to supply much info about his cancer other than he had lymphoma and did not know the type or who treated him. Dr Hawkins thinks he may have been treated by his previous associate, Dr Sarmiento.     At the time of my visit the pt was alert and oriented but was not able to give specific details about any of his medical issues. His chemistries appeared all unremarkable and he was not febrile and his ct of the head showed nothing of note.  I will see if there are any records on his previous onc history and see if there is any reason to think it may have contributed to his present admit here.     5/9 events of last day was noted and pt was intubated and had heart failure. The records were reviewed at [facility] and pt in 2003 had a large cell lymphoma and was treated with R CHOP. [place] [facility] [place] for a recurrence with BR and he has remained in remission.   5/10 still intubated and sedated and labs reviewed and thus far no direct evidence for lymphoma recurrence.  . 5/11 Pt still intubated and sedated. Anemia from icu anemia causes no evidence for recurrent lymphoma.   5/12 the pt was extubated on this date and hemoglobin was noted to be 11.0 will look when stable for any evidenced of recurrent lymphoma.

## 2019-05-12 NOTE — PROGRESS NOTE ADULT - ASSESSMENT
68 year old Non-Hodgkin's Lymphoma tx with chemotherapy in 2004, DM2 with CKD stage 3, HTN, CHF EF 35% on cardiac cath 12/ 2016, pleural effusion s/p left pleuracentesis in 10/2016, cardiomyopathy, gout and HLD presents to the ED sent in from cardiologists office because of AMS    # Respi failure/apnea/hypoxia-  improved sp Extubated  CxR-unchanged rt loculated small effusion, mild interstitial edema    # CHB sp micra ppm implant 5/10   CCU mgmt  cards and EP fu    #Acute encephalopathy. ?etiology   r/o infectious etiology  r/o lymphoma mets to brain /meninges  r/o early dementia  -chronic parietal infarct in CT head  pending MR brain when feasible , neuro cs fu  vitb12, folate, iron panel, rpr and tsh levels wnl  ?LP when feasible    # Fevers  UA neg, CXR neg, bld cx fu  ?need for LP  fu ID recs  monitor off abx  -  # Chronic systolic heart failure.    - TTE EF 45%, likely severe MR, moderately enlarged RV , severe pulmonary hypertension. Estimated PASP = 75 mmHg.  - Medication non compliance  - Holding antihypertensives given CHB  - started furosemide w/ I+Os given signs of volume overload.   CT sx cs noted for severe MR eval    # Non-Hodgkin lymphoma of lymph nodes of neck, unspecified non-Hodgkin lymphoma type.  Plan: - treated with chemotherapy in 2014.   Anemia-monitor h/h  Onc cs fu noted    # Essential hypertension.  Plan: - bp stable  - c/w arb and beta blocker.     # Type 2 diabetes mellitus with chronic kidney disease, without long-term current use of insulin, unspecified CKD stage. Plan: - fs achs  - fs controlled  - start sliding scale insulin  - h    # Chronic gout without tophus, unspecified cause, unspecified site.   - c/w allopurinol 300 mg daily.

## 2019-05-13 DIAGNOSIS — I50.23 ACUTE ON CHRONIC SYSTOLIC (CONGESTIVE) HEART FAILURE: ICD-10-CM

## 2019-05-13 DIAGNOSIS — F05 DELIRIUM DUE TO KNOWN PHYSIOLOGICAL CONDITION: ICD-10-CM

## 2019-05-13 DIAGNOSIS — I67.2 CEREBRAL ATHEROSCLEROSIS: ICD-10-CM

## 2019-05-13 DIAGNOSIS — I34.0 NONRHEUMATIC MITRAL (VALVE) INSUFFICIENCY: ICD-10-CM

## 2019-05-13 LAB
ALBUMIN SERPL ELPH-MCNC: 3.1 G/DL — LOW (ref 3.3–5)
ALP SERPL-CCNC: 113 U/L — SIGNIFICANT CHANGE UP (ref 40–120)
ALT FLD-CCNC: 9 U/L — LOW (ref 10–45)
ANION GAP SERPL CALC-SCNC: 16 MMOL/L — SIGNIFICANT CHANGE UP (ref 5–17)
AST SERPL-CCNC: 16 U/L — SIGNIFICANT CHANGE UP (ref 10–40)
BILIRUB SERPL-MCNC: 1.9 MG/DL — HIGH (ref 0.2–1.2)
BUN SERPL-MCNC: 17 MG/DL — SIGNIFICANT CHANGE UP (ref 7–23)
CALCIUM SERPL-MCNC: 8.8 MG/DL — SIGNIFICANT CHANGE UP (ref 8.4–10.5)
CHLORIDE SERPL-SCNC: 100 MMOL/L — SIGNIFICANT CHANGE UP (ref 96–108)
CO2 SERPL-SCNC: 24 MMOL/L — SIGNIFICANT CHANGE UP (ref 22–31)
CREAT SERPL-MCNC: 1.37 MG/DL — HIGH (ref 0.5–1.3)
GLUCOSE BLDC GLUCOMTR-MCNC: 129 MG/DL — HIGH (ref 70–99)
GLUCOSE BLDC GLUCOMTR-MCNC: 146 MG/DL — HIGH (ref 70–99)
GLUCOSE BLDC GLUCOMTR-MCNC: 165 MG/DL — HIGH (ref 70–99)
GLUCOSE SERPL-MCNC: 168 MG/DL — HIGH (ref 70–99)
HCT VFR BLD CALC: 33.4 % — LOW (ref 39–50)
HGB BLD-MCNC: 11.1 G/DL — LOW (ref 13–17)
MAGNESIUM SERPL-MCNC: 2 MG/DL — SIGNIFICANT CHANGE UP (ref 1.6–2.6)
MCHC RBC-ENTMCNC: 29.3 PG — SIGNIFICANT CHANGE UP (ref 27–34)
MCHC RBC-ENTMCNC: 33.3 GM/DL — SIGNIFICANT CHANGE UP (ref 32–36)
MCV RBC AUTO: 88.1 FL — SIGNIFICANT CHANGE UP (ref 80–100)
PHOSPHATE SERPL-MCNC: 2.9 MG/DL — SIGNIFICANT CHANGE UP (ref 2.5–4.5)
PLATELET # BLD AUTO: 183 K/UL — SIGNIFICANT CHANGE UP (ref 150–400)
POTASSIUM SERPL-MCNC: 4.1 MMOL/L — SIGNIFICANT CHANGE UP (ref 3.5–5.3)
POTASSIUM SERPL-SCNC: 4.1 MMOL/L — SIGNIFICANT CHANGE UP (ref 3.5–5.3)
PROT SERPL-MCNC: 6.7 G/DL — SIGNIFICANT CHANGE UP (ref 6–8.3)
RBC # BLD: 3.79 M/UL — LOW (ref 4.2–5.8)
RBC # FLD: 15.2 % — HIGH (ref 10.3–14.5)
SODIUM SERPL-SCNC: 140 MMOL/L — SIGNIFICANT CHANGE UP (ref 135–145)
WBC # BLD: 10.9 K/UL — HIGH (ref 3.8–10.5)
WBC # FLD AUTO: 10.9 K/UL — HIGH (ref 3.8–10.5)

## 2019-05-13 PROCEDURE — 99223 1ST HOSP IP/OBS HIGH 75: CPT

## 2019-05-13 PROCEDURE — 99291 CRITICAL CARE FIRST HOUR: CPT

## 2019-05-13 RX ORDER — CARVEDILOL PHOSPHATE 80 MG/1
12.5 CAPSULE, EXTENDED RELEASE ORAL ONCE
Refills: 0 | Status: COMPLETED | OUTPATIENT
Start: 2019-05-13 | End: 2019-05-13

## 2019-05-13 RX ORDER — SENNA PLUS 8.6 MG/1
2 TABLET ORAL AT BEDTIME
Refills: 0 | Status: DISCONTINUED | OUTPATIENT
Start: 2019-05-13 | End: 2019-05-24

## 2019-05-13 RX ORDER — FUROSEMIDE 40 MG
40 TABLET ORAL ONCE
Refills: 0 | Status: COMPLETED | OUTPATIENT
Start: 2019-05-13 | End: 2019-05-13

## 2019-05-13 RX ORDER — LANOLIN ALCOHOL/MO/W.PET/CERES
3 CREAM (GRAM) TOPICAL AT BEDTIME
Refills: 0 | Status: DISCONTINUED | OUTPATIENT
Start: 2019-05-13 | End: 2019-05-24

## 2019-05-13 RX ORDER — DIVALPROEX SODIUM 500 MG/1
250 TABLET, DELAYED RELEASE ORAL
Refills: 0 | Status: DISCONTINUED | OUTPATIENT
Start: 2019-05-13 | End: 2019-05-24

## 2019-05-13 RX ORDER — OLANZAPINE 15 MG/1
5 TABLET, FILM COATED ORAL ONCE
Refills: 0 | Status: COMPLETED | OUTPATIENT
Start: 2019-05-13 | End: 2019-05-13

## 2019-05-13 RX ORDER — VALPROIC ACID (AS SODIUM SALT) 250 MG/5ML
125 SOLUTION, ORAL ORAL EVERY 8 HOURS
Refills: 0 | Status: DISCONTINUED | OUTPATIENT
Start: 2019-05-13 | End: 2019-05-24

## 2019-05-13 RX ORDER — FUROSEMIDE 40 MG
40 TABLET ORAL EVERY 12 HOURS
Refills: 0 | Status: DISCONTINUED | OUTPATIENT
Start: 2019-05-13 | End: 2019-05-15

## 2019-05-13 RX ORDER — SPIRONOLACTONE 25 MG/1
25 TABLET, FILM COATED ORAL DAILY
Refills: 0 | Status: DISCONTINUED | OUTPATIENT
Start: 2019-05-13 | End: 2019-05-24

## 2019-05-13 RX ORDER — HYDRALAZINE HCL 50 MG
100 TABLET ORAL EVERY 8 HOURS
Refills: 0 | Status: DISCONTINUED | OUTPATIENT
Start: 2019-05-13 | End: 2019-05-24

## 2019-05-13 RX ORDER — SENNA PLUS 8.6 MG/1
1 TABLET ORAL DAILY
Refills: 0 | Status: DISCONTINUED | OUTPATIENT
Start: 2019-05-13 | End: 2019-05-13

## 2019-05-13 RX ORDER — LOSARTAN POTASSIUM 100 MG/1
50 TABLET, FILM COATED ORAL DAILY
Refills: 0 | Status: DISCONTINUED | OUTPATIENT
Start: 2019-05-13 | End: 2019-05-13

## 2019-05-13 RX ORDER — DIVALPROEX SODIUM 500 MG/1
250 TABLET, DELAYED RELEASE ORAL
Refills: 0 | Status: DISCONTINUED | OUTPATIENT
Start: 2019-05-13 | End: 2019-05-13

## 2019-05-13 RX ORDER — CARVEDILOL PHOSPHATE 80 MG/1
12.5 CAPSULE, EXTENDED RELEASE ORAL EVERY 12 HOURS
Refills: 0 | Status: DISCONTINUED | OUTPATIENT
Start: 2019-05-13 | End: 2019-05-24

## 2019-05-13 RX ORDER — DOCUSATE SODIUM 100 MG
100 CAPSULE ORAL DAILY
Refills: 0 | Status: DISCONTINUED | OUTPATIENT
Start: 2019-05-13 | End: 2019-05-13

## 2019-05-13 RX ORDER — DOCUSATE SODIUM 100 MG
100 CAPSULE ORAL THREE TIMES A DAY
Refills: 0 | Status: DISCONTINUED | OUTPATIENT
Start: 2019-05-13 | End: 2019-05-24

## 2019-05-13 RX ADMIN — Medication 40 MILLIGRAM(S): at 08:33

## 2019-05-13 RX ADMIN — HEPARIN SODIUM 5000 UNIT(S): 5000 INJECTION INTRAVENOUS; SUBCUTANEOUS at 21:38

## 2019-05-13 RX ADMIN — Medication 300 MILLIGRAM(S): at 12:44

## 2019-05-13 RX ADMIN — SENNA PLUS 2 TABLET(S): 8.6 TABLET ORAL at 21:39

## 2019-05-13 RX ADMIN — CARVEDILOL PHOSPHATE 12.5 MILLIGRAM(S): 80 CAPSULE, EXTENDED RELEASE ORAL at 21:38

## 2019-05-13 RX ADMIN — OLANZAPINE 5 MILLIGRAM(S): 15 TABLET, FILM COATED ORAL at 10:15

## 2019-05-13 RX ADMIN — Medication 100 MILLIGRAM(S): at 14:12

## 2019-05-13 RX ADMIN — Medication 3 MILLIGRAM(S): at 21:38

## 2019-05-13 RX ADMIN — ISOSORBIDE DINITRATE 40 MILLIGRAM(S): 5 TABLET ORAL at 12:44

## 2019-05-13 RX ADMIN — Medication 5 MICROGRAM(S)/MIN: at 07:33

## 2019-05-13 RX ADMIN — ISOSORBIDE DINITRATE 40 MILLIGRAM(S): 5 TABLET ORAL at 21:38

## 2019-05-13 RX ADMIN — Medication 100 MILLIGRAM(S): at 03:42

## 2019-05-13 RX ADMIN — DIVALPROEX SODIUM 250 MILLIGRAM(S): 500 TABLET, DELAYED RELEASE ORAL at 17:37

## 2019-05-13 RX ADMIN — CHLORHEXIDINE GLUCONATE 1 APPLICATION(S): 213 SOLUTION TOPICAL at 04:51

## 2019-05-13 RX ADMIN — Medication 81 MILLIGRAM(S): at 12:44

## 2019-05-13 RX ADMIN — ISOSORBIDE DINITRATE 40 MILLIGRAM(S): 5 TABLET ORAL at 04:51

## 2019-05-13 RX ADMIN — Medication 100 MILLIGRAM(S): at 10:30

## 2019-05-13 RX ADMIN — Medication 100 MILLIGRAM(S): at 05:11

## 2019-05-13 RX ADMIN — Medication 1 MILLIGRAM(S): at 12:44

## 2019-05-13 RX ADMIN — Medication 40 MILLIGRAM(S): at 02:05

## 2019-05-13 RX ADMIN — CARVEDILOL PHOSPHATE 12.5 MILLIGRAM(S): 80 CAPSULE, EXTENDED RELEASE ORAL at 10:48

## 2019-05-13 RX ADMIN — PIPERACILLIN AND TAZOBACTAM 25 GRAM(S): 4; .5 INJECTION, POWDER, LYOPHILIZED, FOR SOLUTION INTRAVENOUS at 05:07

## 2019-05-13 RX ADMIN — Medication 100 MILLIGRAM(S): at 21:38

## 2019-05-13 RX ADMIN — HEPARIN SODIUM 5000 UNIT(S): 5000 INJECTION INTRAVENOUS; SUBCUTANEOUS at 05:12

## 2019-05-13 RX ADMIN — SPIRONOLACTONE 25 MILLIGRAM(S): 25 TABLET, FILM COATED ORAL at 05:50

## 2019-05-13 RX ADMIN — Medication 1: at 08:12

## 2019-05-13 RX ADMIN — PIPERACILLIN AND TAZOBACTAM 25 GRAM(S): 4; .5 INJECTION, POWDER, LYOPHILIZED, FOR SOLUTION INTRAVENOUS at 21:40

## 2019-05-13 RX ADMIN — Medication 40 MILLIGRAM(S): at 17:37

## 2019-05-13 RX ADMIN — HEPARIN SODIUM 5000 UNIT(S): 5000 INJECTION INTRAVENOUS; SUBCUTANEOUS at 14:12

## 2019-05-13 RX ADMIN — PIPERACILLIN AND TAZOBACTAM 25 GRAM(S): 4; .5 INJECTION, POWDER, LYOPHILIZED, FOR SOLUTION INTRAVENOUS at 14:12

## 2019-05-13 NOTE — BEHAVIORAL HEALTH ASSESSMENT NOTE - AXIS III
Non-Hodgkin's Lymphoma tx with chemotherapy in 2004, DM2 with CKD stage 3, HTN, CHF EF 35% on cardiac cath 12/ 2016, pleural effusion s/p left pleuracentesis in 10/2016, cardiomyopathy, gout and HLD

## 2019-05-13 NOTE — BEHAVIORAL HEALTH ASSESSMENT NOTE - SUMMARY
This is a 68-year-old CM patient,  but domiciled alone, retired  and worked in construction, with no known PPh and PMH Non-Hodgkin's Lymphoma tx with chemotherapy in 2004, DM2 with CKD stage 3, HTN, CHF EF 35% on cardiac cath 12/ 2016, pleural effusion s/p left pleuracentesis in 10/2016, cardiomyopathy, gout and HLD presents to the ED sent in from cardiologists office because of EKG changes. Psychiatry consulted to assess for confusion, altered mental status, and agitation with concern for vascular dementia.    Patient presents with cognitive impairment and confusion that represent a marked departure from his baseline, fluctuating course indicates delirium, possibly superimposed on vascular dementia.

## 2019-05-13 NOTE — BEHAVIORAL HEALTH ASSESSMENT NOTE - NSBHCHARTREVIEWVS_PSY_A_CORE FT
Vital Signs Last 24 Hrs  T(C): 37.1 (13 May 2019 05:30), Max: 37.1 (13 May 2019 05:30)  T(F): 98.8 (13 May 2019 05:30), Max: 98.8 (13 May 2019 05:30)  HR: 62 (13 May 2019 10:45) (58 - 68)  BP: 151/49 (13 May 2019 10:45) (139/55 - 184/70)  BP(mean): 78 (13 May 2019 10:45) (73 - 132)  RR: 12 (13 May 2019 10:45) (12 - 39)  SpO2: 89% (13 May 2019 10:15) (82% - 100%)  T(C): 37.1 (05-13-19 @ 05:30), Max: 37.1 (05-13-19 @ 05:30)  HR: 62 (05-13-19 @ 10:45) (58 - 68)  BP: 151/49 (05-13-19 @ 10:45) (139/55 - 184/70)  RR: 12 (05-13-19 @ 10:45) (12 - 39)  SpO2: 89% (05-13-19 @ 10:15) (82% - 100%)  Wt(kg): --

## 2019-05-13 NOTE — BEHAVIORAL HEALTH ASSESSMENT NOTE - NSBHCHARTREVIEWINVESTIGATE_PSY_A_CORE FT
Ventricular Rate 56 BPM    Atrial Rate 56 BPM    P-R Interval 368 ms    QRS Duration 188 ms    Q-T Interval 650 ms    QTC Calculation(Bezet) 627 ms    < end of copied text >

## 2019-05-13 NOTE — PROGRESS NOTE ADULT - ASSESSMENT
5/8 as the above record indicates, the pt is here and is a very poor historian. I was asked by Dr Hawkins from oncology and by the pt's internist to see the pt as he did have a past of lymphoma. The oncology office will have to look up records as his history is not readily available. When I came to see the pt he was not able to supply much info about his cancer other than he had lymphoma and did not know the type or who treated him. Dr Hawkins thinks he may have been treated by his previous associate, Dr Sarmiento.     At the time of my visit the pt was alert and oriented but was not able to give specific details about any of his medical issues. His chemistries appeared all unremarkable and he was not febrile and his ct of the head showed nothing of note.  I will see if there are any records on his previous onc history and see if there is any reason to think it may have contributed to his present admit here.     5/9 events of last day was noted and pt was intubated and had heart failure. 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 In 2010 he went to [facility] and was treated for a recurrence with BR and he has remained in remission.   5/10 still intubated and sedated and labs reviewed and thus far no direct evidence for lymphoma recurrence.  . 5/11 Pt still intubated and sedated. Anemia from icu anemia causes no evidence for recurrent lymphoma.   5/12 the pt was extubated on this date and hemoglobin was noted to be 11.0 will look when stable for any evidenced of recurrent lymphoma.   5/13 notes reviewed and pt still looked somewhat disoriented will check him for nodes in the am counts ok.

## 2019-05-13 NOTE — PROGRESS NOTE ADULT - SUBJECTIVE AND OBJECTIVE BOX
Patient is a 68y old  Male who presents with a chief complaint of confusion (13 May 2019 15:42)      INTERVAL HPI/OVERNIGHT EVENTS: pt doing good post extubation  remains confused      Vital Signs Last 24 Hrs  T(C): 36.7 (13 May 2019 17:30), Max: 37.1 (13 May 2019 05:30)  T(F): 98.1 (13 May 2019 17:30), Max: 98.8 (13 May 2019 05:30)  HR: 60 (13 May 2019 17:30) (58 - 68)  BP: 129/69 (13 May 2019 17:30) (107/43 - 184/70)  BP(mean): 96 (13 May 2019 17:30) (68 - 132)  RR: 17 (13 May 2019 17:30) (12 - 39)  SpO2: 95% (13 May 2019 17:00) (82% - 98%)    ALBUTerol/ipratropium for Nebulization 3 milliLiter(s) Nebulizer every 6 hours PRN  allopurinol 300 milliGRAM(s) Oral daily  aspirin  chewable 81 milliGRAM(s) Oral daily  carvedilol 12.5 milliGRAM(s) Oral every 12 hours  chlorhexidine 4% Liquid 1 Application(s) Topical <User Schedule>  dextrose 40% Gel 15 Gram(s) Oral once PRN  dextrose 5%. 1000 milliLiter(s) IV Continuous <Continuous>  dextrose 50% Injectable 12.5 Gram(s) IV Push once  diVALproex  milliGRAM(s) Oral two times a day  docusate sodium 100 milliGRAM(s) Oral three times a day  folic acid 1 milliGRAM(s) Oral daily  furosemide   Injectable 40 milliGRAM(s) IV Push every 12 hours  glucagon  Injectable 1 milliGRAM(s) IntraMuscular once PRN  heparin  Injectable 5000 Unit(s) SubCutaneous every 8 hours  hydrALAZINE 100 milliGRAM(s) Oral every 8 hours  insulin lispro (HumaLOG) corrective regimen sliding scale   SubCutaneous three times a day before meals  isosorbide   dinitrate Tablet (ISORDIL) 40 milliGRAM(s) Oral every 8 hours  melatonin 3 milliGRAM(s) Oral at bedtime  piperacillin/tazobactam IVPB. 3.375 Gram(s) IV Intermittent every 8 hours  senna 2 Tablet(s) Oral at bedtime  sodium chloride 0.9% lock flush 10 milliLiter(s) IV Push every 1 hour PRN  spironolactone 25 milliGRAM(s) Oral daily  valproate sodium IVPB 125 milliGRAM(s) IV Intermittent every 8 hours PRN      PHYSICAL EXAM:  GENERAL: NAD,   EYES: conjunctiva and sclera clear  ENMT: Moist mucous membranes  NECK: Supple, No JVD, Normal thyroid  NERVOUS SYSTEM:  Alert & Oriented X0,   CHEST/LUNG: Clear to auscultation bilaterally ant  HEART: Regular rate and rhythm; No murmurs, rubs, or gallops  ABDOMEN: Soft, Nontender, Nondistended; Bowel sounds present  EXTREMITIES:  2+ edema  LYMPH: No lymphadenopathy noted  SKIN: No rashes or lesions    Consultant(s) Notes Reviewed:  [x ] YES  [ ] NO  Care Discussed with Consultants/Other Providers [ x] YES  [ ] NO    LABS:                        11.1   10.9  )-----------( 183      ( 13 May 2019 02:11 )             33.4     05-13    140  |  100  |  17  ----------------------------<  168<H>  4.1   |  24  |  1.37<H>    Ca    8.8      13 May 2019 02:12  Phos  2.9     05-13  Mg     2.0     05-13    TPro  6.7  /  Alb  3.1<L>  /  TBili  1.9<H>  /  DBili  x   /  AST  16  /  ALT  9<L>  /  AlkPhos  113  05-13        CAPILLARY BLOOD GLUCOSE      POCT Blood Glucose.: 146 mg/dL (13 May 2019 17:05)  POCT Blood Glucose.: 129 mg/dL (13 May 2019 12:29)  POCT Blood Glucose.: 165 mg/dL (13 May 2019 08:09)            RADIOLOGY & ADDITIONAL TESTS: MRI brain Evidence of an old cortically based infarct in the right   lateral temporal parietal region with encephalomalacia and gliosis as   well as some trace old blood products.. No evidence of acute pathology.   Age-appropriate involutional changes.      Imaging Personally Reviewed:  [x ] YES  [ ] NO

## 2019-05-13 NOTE — PROGRESS NOTE ADULT - ASSESSMENT
67 yo Non-Hodgkin's Lymphoma tx w/ chemo in 2004, DM2 w/ CKD stage 3, HTN, CHF EF 35% on cardiac cath 12/ 2016, pleural effusion s/p left pleuracentesis in 10/2016, cardiomyopathy, gout and HLD found to be in complete heart block with   severe MR.  On RHC/LHC shown to have elevated filling pressures and CAD however nonobstructive. On 5/10 pt underwent Micra PPM w/ VVI 60. On 5/11 pt was extubated, pending Ct surgery evaluation for severe MR        #Neuro  AMS  -A+O x2 this AM, agitated   -CTH 5/7 shows chronic right parietal lobe infarct  -MRI head 5/8 shows encephalomalacia + gliosis with traces of old blood products  -would hold off on haldol if pt gets agitated due to QTc >600ms  -PRN  Zyprexa 5mg X1- least prolonging agent in regards to QTC, will continue to reorient  -Psych consulted- to rule out questionable vascular dementia, offer recommendation for antipsychotic  -1:1 observation, reoriented pt  -safety precautions    #Cardiac  CHB s/p micra PPM  -currently paced rate 60bpm  -12.5 of coreg, had 15 beats of VT this AM and last night,  -will need to f/u EP after discharge    Severe MR   - CT surgery, Dr. Teresa- to defer surgical procedure as patient is now encephalophaic   -lasix IVP PRN, strict I+O and daily weights    HTN  -c/w coreg 12.5 q12- goal MAP > 65   -c/w hydralazine 100 TID, up titrated as tolerated  -c/w isosobide 40mg TID, up titrate as tolerated  -s/p nitro gtt    #Renal   STEPHEN on CKD stage III  -Trending BUN/SCr, now creatinine trending down - appears to be at patients baseline   -avoid nephrotoxic meds, renally dose    #ID  -Leukocytosis with slight increase this AM  -c/w zosyn 3.375mg q8hrs 5/9 ( day 5/7 )  -off vancomycin 5/9-5/11  -blood cultures 5/10 ngtd, 5/9 ngtd

## 2019-05-13 NOTE — PROGRESS NOTE ADULT - SUBJECTIVE AND OBJECTIVE BOX
PATIENT:  NIK GRIMM  [id number]    CHIEF COMPLAINT:  Patient is a 68y old  Male who presents with a chief complaint of confusion (12 May 2019 23:08)      INTERVAL HISTORY/OVERNIGHT EVENTS:      REVIEW OF SYSTEMS:    Constitutional:     [ ] negative [ ] fevers [ ] chills [ ] weight loss [ ] weight gain  HEENT:                  [ ] negative [ ] dry eyes [ ] eye irritation [ ] postnasal drip [ ] nasal congestion  CV:                         [ ] negative  [ ] chest pain [ ] orthopnea [ ] palpitations [ ] murmur  Resp:                     [ ] negative [ ] cough [ ] shortness of breath [ ] dyspnea [ ] wheezing [ ] sputum [ ] hemoptysis  GI:                          [ ] negative [ ] nausea [ ] vomiting [ ] diarrhea [ ] constipation [ ] abd pain [ ] dysphagia   :                        [ ] negative [ ] dysuria [ ] nocturia [ ] hematuria [ ] increased urinary frequency  Musculoskeletal: [ ] negative [ ] back pain [ ] myalgias [ ] arthralgias [ ] fracture  Skin:                       [ ] negative [ ] rash [ ] itch  Neurological:        [ ] negative [ ] headache [ ] dizziness [ ] syncope [ ] weakness [ ] numbness  Psychiatric:           [ ] negative [ ] anxiety [ ] depression  Endocrine:            [ ] negative [ ] diabetes [ ] thyroid problem  Heme/Lymph:      [ ] negative [ ] anemia [ ] bleeding problem  Allergic/Immune: [ ] negative [ ] itchy eyes [ ] nasal discharge [ ] hives [ ] angioedema    [ ] All other systems negative  [ ] Unable to assess ROS because ________.    MEDICATIONS:  MEDICATIONS  (STANDING):  allopurinol 300 milliGRAM(s) Oral daily  aspirin  chewable 81 milliGRAM(s) Oral daily  chlorhexidine 4% Liquid 1 Application(s) Topical <User Schedule>  dextrose 5%. 1000 milliLiter(s) (50 mL/Hr) IV Continuous <Continuous>  dextrose 50% Injectable 12.5 Gram(s) IV Push once  docusate sodium 100 milliGRAM(s) Oral three times a day  folic acid 1 milliGRAM(s) Oral daily  heparin  Injectable 5000 Unit(s) SubCutaneous every 8 hours  hydrALAZINE 100 milliGRAM(s) Oral every 8 hours  insulin lispro (HumaLOG) corrective regimen sliding scale   SubCutaneous three times a day before meals  isosorbide   dinitrate Tablet (ISORDIL) 40 milliGRAM(s) Oral every 8 hours  nitroglycerin  Infusion 16.667 MICROgram(s)/Min (5 mL/Hr) IV Continuous <Continuous>  piperacillin/tazobactam IVPB. 3.375 Gram(s) IV Intermittent every 8 hours  senna 2 Tablet(s) Oral at bedtime  spironolactone 25 milliGRAM(s) Oral daily    MEDICATIONS  (PRN):  ALBUTerol/ipratropium for Nebulization 3 milliLiter(s) Nebulizer every 6 hours PRN Bronchospasm  dextrose 40% Gel 15 Gram(s) Oral once PRN Blood Glucose LESS THAN 70 milliGRAM(s)/deciliter  glucagon  Injectable 1 milliGRAM(s) IntraMuscular once PRN Glucose LESS THAN 70 milligrams/deciliter  sodium chloride 0.9% lock flush 10 milliLiter(s) IV Push every 1 hour PRN Pre/post blood products, medications, blood draw, and to maintain line patency      ALLERGIES:  Allergies    No Known Allergies    Intolerances        OBJECTIVE:  ICU Vital Signs Last 24 Hrs  T(C): 37.1 (13 May 2019 05:30), Max: 37.1 (13 May 2019 05:30)  T(F): 98.8 (13 May 2019 05:30), Max: 98.8 (13 May 2019 05:30)  HR: 60 (13 May 2019 06:45) (58 - 62)  BP: 167/54 (13 May 2019 06:45) (139/55 - 181/63)  BP(mean): 79 (13 May 2019 06:45) (75 - 132)  ABP: --  ABP(mean): --  RR: 24 (13 May 2019 06:45) (13 - 44)  SpO2: 94% (13 May 2019 06:45) (82% - 100%)      Adult Advanced Hemodynamics Last 24 Hrs  CVP(mm Hg): 7 (12 May 2019 14:15) (2 - 14)  CVP(cm H2O): --  CO: --  CI: --  PA: --  PA(mean): --  PCWP: --  SVR: --  SVRI: --  PVR: --  PVRI: --  CAPILLARY BLOOD GLUCOSE      POCT Blood Glucose.: 171 mg/dL (12 May 2019 13:48)    CAPILLARY BLOOD GLUCOSE      POCT Blood Glucose.: 171 mg/dL (12 May 2019 13:48)    I&O's Summary    12 May 2019 07:01  -  13 May 2019 07:00  --------------------------------------------------------  IN: 1742.5 mL / OUT: 1325 mL / NET: 417.5 mL      Daily     Daily     PHYSICAL EXAMINATION:  General: WN/WD NAD  HEENT: PERRLA, EOMI, moist mucous membranes  Neurology: A&Ox3, nonfocal, VAUGHAN x 4  Respiratory: CTA B/L, normal respiratory effort, no wheezes, crackles, rales  CV: RRR, S1S2, no murmurs, rubs or gallops  Abdominal: Soft, NT, ND +BS, Last BM  Extremities: No edema, + peripheral pulses  Incisions:   Tubes:    LABS:  ABG - ( 11 May 2019 15:52 )  pH, Arterial: 7.46  pH, Blood: x     /  pCO2: 43    /  pO2: 88    / HCO3: 30    / Base Excess: 5.6   /  SaO2: 98                     11.1   10.9  )-----------( 183      ( 13 May 2019 02:11 )             33.4     05-13    140  |  100  |  17  ----------------------------<  168<H>  4.1   |  24  |  1.37<H>    Ca    8.8      13 May 2019 02:12  Phos  2.9     05-13  Mg     2.0     05-13    TPro  6.7  /  Alb  3.1<L>  /  TBili  1.9<H>  /  DBili  x   /  AST  16  /  ALT  9<L>  /  AlkPhos  113  05-13    LIVER FUNCTIONS - ( 13 May 2019 02:12 )  Alb: 3.1 g/dL / Pro: 6.7 g/dL / ALK PHOS: 113 U/L / ALT: 9 U/L / AST: 16 U/L / GGT: x           TELEMETRY:     EKG:     IMAGING:      #Neuro  AMS  -A+O x1 to person  -CTH 5/7 shows chronic right parietal lobe infarct  -MRI head 5/8 shows encephalomalacia + gliosis with traces of old blood products  -would hold off on haldol if pt gets agitated due to QTc >600ms  -1:1 observation, reoriented pt  -safety precautions    #Cardiac  CHB s/p micra PPM  -currently paced rate 60bpm  -holding AV node blockers at this time   -will need to f/u EP after discharge    Severe MR   -eval in progress by CT surgery, Dr. Teresa  -lasix IVP PRN, strict I+O and daily weights    HTN  -wean off nitro gtt as tolerated by BP  -c/w hydralazine 75 TID, up titrated as tolerated  -c/w isosobide 40mg TID, up titrate as tolerated    #Renal   STEPHEN on CKD stage III  -Trending BUN/SCr, SCr 1.44 with peak @ 1.82  -avoid nephrotoxic meds, renally dose    #ID  Febrile T max 101.1 5/9  -c/w zosyn 3.375mg q8hrs 5/9  -off vancomycin 5/9-5/11  -blood cultures 5/10 ngtd, 5/9 ngtd PATIENT:  NIK BARNETTOURT  [id number]    CHIEF COMPLAINT:  Patient is a 68y old  Male who presents with a chief complaint of confusion (12 May 2019 23:08)      INTERVAL HISTORY/OVERNIGHT EVENTS: Patient was on BIPAP overnight, transitioned to Nasal cannula today. Was extubated on Saturday, Nitro gtt was uptitrated overnight as was isosorbide to 40 TID, and hydralazine 100 TID. Patient is confused at baseline, requires a one to one and needs constant reorientation. Has prolonged QTC, so no prolonging agents able to be used. currently urinating 100-250 cc/ hour with net 1.3 L out over night.     REVIEW OF SYSTEMS:  REVIEW OF SYSTEMS:  CONSTITUTIONAL: No weakness, fevers or chills  EYES/ENT: No visual changes;  No vertigo or throat pain   NECK: No pain or stiffness  RESPIRATORY: No cough, wheezing, hemoptysis; No shortness of breath  CARDIOVASCULAR: No chest pain or palpitations  GASTROINTESTINAL: No abdominal or epigastric pain. No nausea, vomiting, or hematemesis; No diarrhea or constipation. No melena or hematochezia.  GENITOURINARY: No dysuria, frequency or hematuria  NEUROLOGICAL: No numbness or weakness  SKIN: No itching, burning, rashes, or lesions   All other review of systems is negative unless indicated above.      MEDICATIONS:  MEDICATIONS  (STANDING):  allopurinol 300 milliGRAM(s) Oral daily  aspirin  chewable 81 milliGRAM(s) Oral daily  chlorhexidine 4% Liquid 1 Application(s) Topical <User Schedule>  dextrose 5%. 1000 milliLiter(s) (50 mL/Hr) IV Continuous <Continuous>  dextrose 50% Injectable 12.5 Gram(s) IV Push once  docusate sodium 100 milliGRAM(s) Oral three times a day  folic acid 1 milliGRAM(s) Oral daily  heparin  Injectable 5000 Unit(s) SubCutaneous every 8 hours  hydrALAZINE 100 milliGRAM(s) Oral every 8 hours  insulin lispro (HumaLOG) corrective regimen sliding scale   SubCutaneous three times a day before meals  isosorbide   dinitrate Tablet (ISORDIL) 40 milliGRAM(s) Oral every 8 hours  nitroglycerin  Infusion 16.667 MICROgram(s)/Min (5 mL/Hr) IV Continuous <Continuous>  piperacillin/tazobactam IVPB. 3.375 Gram(s) IV Intermittent every 8 hours  senna 2 Tablet(s) Oral at bedtime  spironolactone 25 milliGRAM(s) Oral daily    MEDICATIONS  (PRN):  ALBUTerol/ipratropium for Nebulization 3 milliLiter(s) Nebulizer every 6 hours PRN Bronchospasm  dextrose 40% Gel 15 Gram(s) Oral once PRN Blood Glucose LESS THAN 70 milliGRAM(s)/deciliter  glucagon  Injectable 1 milliGRAM(s) IntraMuscular once PRN Glucose LESS THAN 70 milligrams/deciliter  sodium chloride 0.9% lock flush 10 milliLiter(s) IV Push every 1 hour PRN Pre/post blood products, medications, blood draw, and to maintain line patency      ALLERGIES:  Allergies    No Known Allergies    Intolerances        OBJECTIVE:  ICU Vital Signs Last 24 Hrs  T(C): 37.1 (13 May 2019 05:30), Max: 37.1 (13 May 2019 05:30)  T(F): 98.8 (13 May 2019 05:30), Max: 98.8 (13 May 2019 05:30)  HR: 60 (13 May 2019 06:45) (58 - 62)  BP: 167/54 (13 May 2019 06:45) (139/55 - 181/63)  BP(mean): 79 (13 May 2019 06:45) (75 - 132)  ABP: --  ABP(mean): --  RR: 24 (13 May 2019 06:45) (13 - 44)  SpO2: 94% (13 May 2019 06:45) (82% - 100%)      Adult Advanced Hemodynamics Last 24 Hrs  CVP(mm Hg): 7 (12 May 2019 14:15) (2 - 14)  CVP(cm H2O): --  CO: --  CI: --  PA: --  PA(mean): --  PCWP: --  SVR: --  SVRI: --  PVR: --  PVRI: --  CAPILLARY BLOOD GLUCOSE      POCT Blood Glucose.: 171 mg/dL (12 May 2019 13:48)    CAPILLARY BLOOD GLUCOSE      POCT Blood Glucose.: 171 mg/dL (12 May 2019 13:48)    I&O's Summary    12 May 2019 07:01  -  13 May 2019 07:00  --------------------------------------------------------  IN: 1742.5 mL / OUT: 1325 mL / NET: 417.5 mL      Daily     Daily     PHYSICAL EXAMINATION:  General: WN/WD NAD  HEENT: PERRLA, EOMI, moist mucous membranes  Neurology: A&Ox3, nonfocal, VAUGHAN x 4  Respiratory: CTA B/L, normal respiratory effort, no wheezes, crackles, rales  CV: RRR, S1S2, no murmurs, rubs or gallops  Abdominal: Soft, NT, ND +BS, Last BM  Extremities: No edema, + peripheral pulses  Incisions:   Tubes:    LABS:  ABG - ( 11 May 2019 15:52 )  pH, Arterial: 7.46  pH, Blood: x     /  pCO2: 43    /  pO2: 88    / HCO3: 30    / Base Excess: 5.6   /  SaO2: 98                     11.1   10.9  )-----------( 183      ( 13 May 2019 02:11 )             33.4     05-13    140  |  100  |  17  ----------------------------<  168<H>  4.1   |  24  |  1.37<H>    Ca    8.8      13 May 2019 02:12  Phos  2.9     05-13  Mg     2.0     05-13    TPro  6.7  /  Alb  3.1<L>  /  TBili  1.9<H>  /  DBili  x   /  AST  16  /  ALT  9<L>  /  AlkPhos  113  05-13    LIVER FUNCTIONS - ( 13 May 2019 02:12 )  Alb: 3.1 g/dL / Pro: 6.7 g/dL / ALK PHOS: 113 U/L / ALT: 9 U/L / AST: 16 U/L / GGT: x           TELEMETRY:     EKG:     IMAGING:    PLAN:    67 yo Non-Hodgkin's Lymphoma tx w/ chemo in 2004, DM2 w/ CKD stage 3, HTN, CHF EF 35% on cardiac cath 12/ 2016, pleural effusion s/p left pleuracentesis in 10/2016, cardiomyopathy, gout and HLD found to be in complete heart block with   severe MR.  On RHC/LHC shown to have elevated filling pressures and CAD however nonobstructive. On 5/10 pt underwent Micra PPM w/ VVI 60. On 5/11 pt was extubated.       #Neuro  AMS  -A+O x1 to person  -CTH 5/7 shows chronic right parietal lobe infarct  -MRI head 5/8 shows encephalomalacia + gliosis with traces of old blood products  -would hold off on haldol if pt gets agitated due to QTc >600ms  -1:1 observation, reoriented pt  -safety precautions    #Cardiac  CHB s/p micra PPM  -currently paced rate 60bpm  -holding AV node blockers at this time   -will need to f/u EP after discharge    Severe MR   -eval in progress by CT surgery, Dr. Teresa  -lasix IVP PRN, strict I+O and daily weights    HTN  -wean off nitro gtt as tolerated by BP  -c/w hydralazine 75 TID, up titrated as tolerated  -c/w isosobide 40mg TID, up titrate as tolerated    #Renal   STEPHEN on CKD stage III  -Trending BUN/SCr, SCr 1.44 with peak @ 1.82  -avoid nephrotoxic meds, renally dose    #ID  Febrile T max 101.1 5/9  -c/w zosyn 3.375mg q8hrs 5/9  -off vancomycin 5/9-5/11  -blood cultures 5/10 ngtd, 5/9 ngtd PATIENT:  NIK GRIMM  [id number]    CHIEF COMPLAINT:  Patient is a 68y old  Male who presents with a chief complaint of confusion (12 May 2019 23:08)      INTERVAL HISTORY/OVERNIGHT EVENTS: Patient was on BIPAP overnight, transitioned to Nasal cannula today. Was extubated on Saturday, Nitro gtt was uptitrated overnight as was isosorbide to 40 TID, and hydralazine 100 TID. Patient is confused at baseline, requires a one to one and needs constant reorientation. Has prolonged QTC, so no prolonging agents able to be used. currently urinating 100-250 cc/ hour with net 1.3 L out over night.  Patient alert and oriented this AM, at times tachypneic to the 40's , desaturates to high 80's. appears comfortable in bed in no acute distress and with no complaints this AM    REVIEW OF SYSTEMS:  CONSTITUTIONAL: No weakness, fevers or chills  EYES/ENT: No visual changes;  No vertigo or throat pain   NECK: No pain or stiffness  RESPIRATORY: No cough, wheezing, hemoptysis; No shortness of breath  CARDIOVASCULAR: No chest pain or palpitations  GASTROINTESTINAL: No abdominal or epigastric pain. No nausea, vomiting, or hematemesis; No diarrhea or constipation. No melena or hematochezia.  GENITOURINARY: No dysuria, frequency or hematuria  NEUROLOGICAL: No numbness or weakness  SKIN: No itching, burning, rashes, or lesions   All other review of systems is negative unless indicated above.      MEDICATIONS:  MEDICATIONS  (STANDING):  allopurinol 300 milliGRAM(s) Oral daily  aspirin  chewable 81 milliGRAM(s) Oral daily  chlorhexidine 4% Liquid 1 Application(s) Topical <User Schedule>  dextrose 5%. 1000 milliLiter(s) (50 mL/Hr) IV Continuous <Continuous>  dextrose 50% Injectable 12.5 Gram(s) IV Push once  docusate sodium 100 milliGRAM(s) Oral three times a day  folic acid 1 milliGRAM(s) Oral daily  heparin  Injectable 5000 Unit(s) SubCutaneous every 8 hours  hydrALAZINE 100 milliGRAM(s) Oral every 8 hours  insulin lispro (HumaLOG) corrective regimen sliding scale   SubCutaneous three times a day before meals  isosorbide   dinitrate Tablet (ISORDIL) 40 milliGRAM(s) Oral every 8 hours  nitroglycerin  Infusion 16.667 MICROgram(s)/Min (5 mL/Hr) IV Continuous <Continuous>  piperacillin/tazobactam IVPB. 3.375 Gram(s) IV Intermittent every 8 hours  senna 2 Tablet(s) Oral at bedtime  spironolactone 25 milliGRAM(s) Oral daily    MEDICATIONS  (PRN):  ALBUTerol/ipratropium for Nebulization 3 milliLiter(s) Nebulizer every 6 hours PRN Bronchospasm  dextrose 40% Gel 15 Gram(s) Oral once PRN Blood Glucose LESS THAN 70 milliGRAM(s)/deciliter  glucagon  Injectable 1 milliGRAM(s) IntraMuscular once PRN Glucose LESS THAN 70 milligrams/deciliter  sodium chloride 0.9% lock flush 10 milliLiter(s) IV Push every 1 hour PRN Pre/post blood products, medications, blood draw, and to maintain line patency      ALLERGIES:  Allergies    No Known Allergies    Intolerances        OBJECTIVE:  ICU Vital Signs Last 24 Hrs  T(C): 37.1 (13 May 2019 05:30), Max: 37.1 (13 May 2019 05:30)  T(F): 98.8 (13 May 2019 05:30), Max: 98.8 (13 May 2019 05:30)  HR: 60 (13 May 2019 06:45) (58 - 62)  BP: 167/54 (13 May 2019 06:45) (139/55 - 181/63)  BP(mean): 79 (13 May 2019 06:45) (75 - 132)  ABP: --  ABP(mean): --  RR: 24 (13 May 2019 06:45) (13 - 44)  SpO2: 94% (13 May 2019 06:45) (82% - 100%)      Adult Advanced Hemodynamics Last 24 Hrs  CVP(mm Hg): 7 (12 May 2019 14:15) (2 - 14)  CVP(cm H2O): --  CO: --  CI: --  PA: --  PA(mean): --  PCWP: --  SVR: --  SVRI: --  PVR: --  PVRI: --  CAPILLARY BLOOD GLUCOSE      POCT Blood Glucose.: 171 mg/dL (12 May 2019 13:48)    CAPILLARY BLOOD GLUCOSE      POCT Blood Glucose.: 171 mg/dL (12 May 2019 13:48)    I&O's Summary    12 May 2019 07:01  -  13 May 2019 07:00  --------------------------------------------------------  IN: 1742.5 mL / OUT: 1325 mL / NET: 417.5 mL      Daily     Daily     PHYSICAL EXAMINATION:  General: WN/WD NAD  HEENT: PERRLA, EOMI, moist mucous membranes  Neurology: A&Ox3, nonfocal, VAUGHAN x 4  Respiratory: CTA B/L, minimal crackles on posterior auscultation middle lobes b/l  CV: RRR, S1S2, no murmurs, rubs or gallops  Abdominal: Soft, NT, ND +BS, Last BM  Extremities: No edema, + peripheral pulses  Incisions:   Tubes:    LABS:  ABG - ( 11 May 2019 15:52 )  pH, Arterial: 7.46  pH, Blood: x     /  pCO2: 43    /  pO2: 88    / HCO3: 30    / Base Excess: 5.6   /  SaO2: 98                     11.1   10.9  )-----------( 183      ( 13 May 2019 02:11 )             33.4     05-13    140  |  100  |  17  ----------------------------<  168<H>  4.1   |  24  |  1.37<H>    Ca    8.8      13 May 2019 02:12  Phos  2.9     05-13  Mg     2.0     05-13    TPro  6.7  /  Alb  3.1<L>  /  TBili  1.9<H>  /  DBili  x   /  AST  16  /  ALT  9<L>  /  AlkPhos  113  05-13    LIVER FUNCTIONS - ( 13 May 2019 02:12 )  Alb: 3.1 g/dL / Pro: 6.7 g/dL / ALK PHOS: 113 U/L / ALT: 9 U/L / AST: 16 U/L / GGT: x           TELEMETRY:     EKG:     IMAGING:    PLAN:    67 yo Non-Hodgkin's Lymphoma tx w/ chemo in 2004, DM2 w/ CKD stage 3, HTN, CHF EF 35% on cardiac cath 12/ 2016, pleural effusion s/p left pleuracentesis in 10/2016, cardiomyopathy, gout and HLD found to be in complete heart block with   severe MR.  On RHC/LHC shown to have elevated filling pressures and CAD however nonobstructive. On 5/10 pt underwent Micra PPM w/ VVI 60. On 5/11 pt was extubated, pending Ct surgery evaluation for severe MR        #Neuro  AMS  -A+O x3 this AM  -CTH 5/7 shows chronic right parietal lobe infarct  -MRI head 5/8 shows encephalomalacia + gliosis with traces of old blood products  -would hold off on haldol if pt gets agitated due to QTc >600ms  -1:1 observation, reoriented pt  -safety precautions    #Cardiac  CHB s/p micra PPM  -currently paced rate 60bpm  -holding AV node blockers at this time   -will need to f/u EP after discharge    Severe MR   -eval in progress by CT surgery, Dr. Teresa  -lasix IVP PRN, strict I+O and daily weights    HTN  -wean off nitro gtt as tolerated by BP  -c/w hydralazine 100 TID, up titrated as tolerated  -c/w isosobide 40mg TID, up titrate as tolerated    #Renal   STEHPEN on CKD stage III  -Trending BUN/SCr, now creatinine trending down - appears to be at patients baseline   -avoid nephrotoxic meds, renally dose    #ID  -Leukocytosis with slight increase this AM  -c/w zosyn 3.375mg q8hrs 5/9 ( day 5 )  -off vancomycin 5/9-5/11  -blood cultures 5/10 ngtd, 5/9 ngtd PATIENT:  NIK GRIMM  [id number]    CHIEF COMPLAINT:  Patient is a 68y old  Male who presents with a chief complaint of confusion (12 May 2019 23:08)      INTERVAL HISTORY/OVERNIGHT EVENTS: Patient was on BIPAP overnight, transitioned to Nasal cannula today. Was extubated on Saturday, Nitro gtt was uptitrated overnight as was isosorbide to 40 TID, and hydralazine 100 TID. Patient is confused at baseline, requires a one to one and needs constant reorientation. Has prolonged QTC, so no prolonging agents able to be used. currently urinating 100-250 cc/ hour with net 1.3 L out over night.  Patient alert and oriented this AM, at times tachypneic to the 40's , desaturates to high 80's was given a stat dose of 40 lasix. patient has been agitated later on wanting to stand up, leave the room, raising his voice. Received 5 of zyprexa.      REVIEW OF SYSTEMS:  CONSTITUTIONAL: No weakness, fevers or chills  EYES/ENT: No visual changes;  No vertigo or throat pain   NECK: No pain or stiffness  RESPIRATORY: No cough, wheezing, hemoptysis; No shortness of breath  CARDIOVASCULAR: No chest pain or palpitations  GASTROINTESTINAL: No abdominal or epigastric pain. No nausea, vomiting, or hematemesis; No diarrhea or constipation. No melena or hematochezia.  GENITOURINARY: No dysuria, frequency or hematuria  NEUROLOGICAL: No numbness or weakness  SKIN: No itching, burning, rashes, or lesions   All other review of systems is negative unless indicated above.      MEDICATIONS:  MEDICATIONS  (STANDING):  allopurinol 300 milliGRAM(s) Oral daily  aspirin  chewable 81 milliGRAM(s) Oral daily  chlorhexidine 4% Liquid 1 Application(s) Topical <User Schedule>  dextrose 5%. 1000 milliLiter(s) (50 mL/Hr) IV Continuous <Continuous>  dextrose 50% Injectable 12.5 Gram(s) IV Push once  docusate sodium 100 milliGRAM(s) Oral three times a day  folic acid 1 milliGRAM(s) Oral daily  heparin  Injectable 5000 Unit(s) SubCutaneous every 8 hours  hydrALAZINE 100 milliGRAM(s) Oral every 8 hours  insulin lispro (HumaLOG) corrective regimen sliding scale   SubCutaneous three times a day before meals  isosorbide   dinitrate Tablet (ISORDIL) 40 milliGRAM(s) Oral every 8 hours  nitroglycerin  Infusion 16.667 MICROgram(s)/Min (5 mL/Hr) IV Continuous <Continuous>  piperacillin/tazobactam IVPB. 3.375 Gram(s) IV Intermittent every 8 hours  senna 2 Tablet(s) Oral at bedtime  spironolactone 25 milliGRAM(s) Oral daily    MEDICATIONS  (PRN):  ALBUTerol/ipratropium for Nebulization 3 milliLiter(s) Nebulizer every 6 hours PRN Bronchospasm  dextrose 40% Gel 15 Gram(s) Oral once PRN Blood Glucose LESS THAN 70 milliGRAM(s)/deciliter  glucagon  Injectable 1 milliGRAM(s) IntraMuscular once PRN Glucose LESS THAN 70 milligrams/deciliter  sodium chloride 0.9% lock flush 10 milliLiter(s) IV Push every 1 hour PRN Pre/post blood products, medications, blood draw, and to maintain line patency      ALLERGIES:  Allergies    No Known Allergies    Intolerances        OBJECTIVE:  ICU Vital Signs Last 24 Hrs  T(C): 37.1 (13 May 2019 05:30), Max: 37.1 (13 May 2019 05:30)  T(F): 98.8 (13 May 2019 05:30), Max: 98.8 (13 May 2019 05:30)  HR: 60 (13 May 2019 06:45) (58 - 62)  BP: 167/54 (13 May 2019 06:45) (139/55 - 181/63)  BP(mean): 79 (13 May 2019 06:45) (75 - 132)  ABP: --  ABP(mean): --  RR: 24 (13 May 2019 06:45) (13 - 44)  SpO2: 94% (13 May 2019 06:45) (82% - 100%)      Adult Advanced Hemodynamics Last 24 Hrs  CVP(mm Hg): 7 (12 May 2019 14:15) (2 - 14)  CVP(cm H2O): --  CO: --  CI: --  PA: --  PA(mean): --  PCWP: --  SVR: --  SVRI: --  PVR: --  PVRI: --  CAPILLARY BLOOD GLUCOSE      POCT Blood Glucose.: 171 mg/dL (12 May 2019 13:48)    CAPILLARY BLOOD GLUCOSE      POCT Blood Glucose.: 171 mg/dL (12 May 2019 13:48)    I&O's Summary    12 May 2019 07:01  -  13 May 2019 07:00  --------------------------------------------------------  IN: 1742.5 mL / OUT: 1325 mL / NET: 417.5 mL      Daily     Daily     PHYSICAL EXAMINATION:  General: WN/WD NAD  HEENT: PERRLA, EOMI, moist mucous membranes  Neurology: A&Ox3, nonfocal, VAUGHAN x 4  Respiratory: CTA B/L, minimal crackles on posterior auscultation middle lobes b/l  CV: RRR, S1S2, no murmurs, rubs or gallops  Abdominal: Soft, NT, ND +BS, Last BM  Extremities: No edema, + peripheral pulses  Incisions:   Tubes:    LABS:  ABG - ( 11 May 2019 15:52 )  pH, Arterial: 7.46  pH, Blood: x     /  pCO2: 43    /  pO2: 88    / HCO3: 30    / Base Excess: 5.6   /  SaO2: 98                     11.1   10.9  )-----------( 183      ( 13 May 2019 02:11 )             33.4     05-13    140  |  100  |  17  ----------------------------<  168<H>  4.1   |  24  |  1.37<H>    Ca    8.8      13 May 2019 02:12  Phos  2.9     05-13  Mg     2.0     05-13    TPro  6.7  /  Alb  3.1<L>  /  TBili  1.9<H>  /  DBili  x   /  AST  16  /  ALT  9<L>  /  AlkPhos  113  05-13    LIVER FUNCTIONS - ( 13 May 2019 02:12 )  Alb: 3.1 g/dL / Pro: 6.7 g/dL / ALK PHOS: 113 U/L / ALT: 9 U/L / AST: 16 U/L / GGT: x           TELEMETRY:     EKG:     IMAGING:    PLAN:    69 yo Non-Hodgkin's Lymphoma tx w/ chemo in 2004, DM2 w/ CKD stage 3, HTN, CHF EF 35% on cardiac cath 12/ 2016, pleural effusion s/p left pleuracentesis in 10/2016, cardiomyopathy, gout and HLD found to be in complete heart block with   severe MR.  On RHC/LHC shown to have elevated filling pressures and CAD however nonobstructive. On 5/10 pt underwent Micra PPM w/ VVI 60. On 5/11 pt was extubated, pending Ct surgery evaluation for severe MR        #Neuro  AMS  -A+O x2 this AM, agitated   -CTH 5/7 shows chronic right parietal lobe infarct  -MRI head 5/8 shows encephalomalacia + gliosis with traces of old blood products  -would hold off on haldol if pt gets agitated due to QTc >600ms  -PRN  Zyprexa 5mg X1- least prolonging agent in regards to QTC, will continue to reorient  -Psych consulted- to rule out questionable vascular dementia, offer recommendation for antipsychotic  -1:1 observation, reoriented pt  -safety precautions    #Cardiac  CHB s/p micra PPM  -currently paced rate 60bpm  -12.5 of coreg, had 15 beats of VT this AM and last night,  -will need to f/u EP after discharge    Severe MR   - CT surgery, Dr. Teresa- to defer surgical procedure as patient is now encephalophaic   -lasix IVP PRN, strict I+O and daily weights    HTN  -wean off nitro gtt as tolerated by BP  -start coreg 12.5 q12- goal MAP > 65   -c/w hydralazine 100 TID, up titrated as tolerated  -c/w isosobide 40mg TID, up titrate as tolerated    #Renal   STEPHEN on CKD stage III  -Trending BUN/SCr, now creatinine trending down - appears to be at patients baseline   -avoid nephrotoxic meds, renally dose    #ID  -Leukocytosis with slight increase this AM  -c/w zosyn 3.375mg q8hrs 5/9 ( day 5/7 )  -off vancomycin 5/9-5/11  -blood cultures 5/10 ngtd, 5/9 ngtd

## 2019-05-13 NOTE — PROGRESS NOTE ADULT - ATTENDING COMMENTS
Patient is seen and examined with fellow, NP and the CCU house-staff. I agree with the history, physical and the assessment and plan.  s/p micra and cardiac cath  will c/w diuresis  start low dose BB  psych evaluation pending

## 2019-05-13 NOTE — PROGRESS NOTE ADULT - ASSESSMENT
1. confusion ?deemntial progressive etiology unclear  2. condction disease CHB s/p pacemaker  3 severe MR moderate LV dysfunction pulmonary hypertension  4. cardiomyopathy  5. CRI improved renal fucntin    Recommend  neuro followup re: mental status  continue present medications  structural heart/CT surgical evaluation  evauation re:MR

## 2019-05-13 NOTE — CONSULT NOTE ADULT - SUBJECTIVE AND OBJECTIVE BOX
68y Male    HPI:  The patient is a 67 y/o male, who is resting comfortably in bed with his daughter at bedside, and on 1:1 observation. He was sent in last week by his PMD for SOB and Altered Mental Status. Until this event, his daughter states he was living on his own, and able to perform his own ADL's, and he was alert without any confusion. During his first 1-2 days here, he had increasing SOB and Agitation which had required intubation. He was diuresed over the weekend and extubated yesterday. TTE and KUSHAL showed Severe MR. He was also found on admission to have CHB, and a Micra was placed.    The patient is a poor historian, however both he and the daughter state that over the past few months, he has gotten SOB with Moderate activity, or climbing stairs. He denies any dizziness or CP. His major complaint though is claudication to both lower extremities over the past few months, which he had stated prior to this event is worse than his breathing.          PAST MEDICAL & SURGICAL HISTORY:  Pleural effusion  Moderate mitral regurgitation  Systolic heart failure  Rhinitis, allergic  Essential hypertension  DM type 2 (diabetes mellitus, type 2)  Non-Hodgkins Lymphoma  Non-Hodgkin lymphoma: h/o axillary dissection      No Known Allergies    allopurinol 300 milliGRAM(s) Oral daily  aspirin  chewable 81 milliGRAM(s) Oral daily  carvedilol 12.5 milliGRAM(s) Oral every 12 hours  chlorhexidine 4% Liquid 1 Application(s) Topical <User Schedule>  dextrose 5%. 1000 milliLiter(s) IV Continuous <Continuous>  dextrose 50% Injectable 12.5 Gram(s) IV Push once  diVALproex  milliGRAM(s) Oral two times a day  docusate sodium 100 milliGRAM(s) Oral three times a day  folic acid 1 milliGRAM(s) Oral daily  heparin  Injectable 5000 Unit(s) SubCutaneous every 8 hours  hydrALAZINE 100 milliGRAM(s) Oral every 8 hours  insulin lispro (HumaLOG) corrective regimen sliding scale   SubCutaneous three times a day before meals  isosorbide   dinitrate Tablet (ISORDIL) 40 milliGRAM(s) Oral every 8 hours  melatonin 3 milliGRAM(s) Oral at bedtime  piperacillin/tazobactam IVPB. 3.375 Gram(s) IV Intermittent every 8 hours  senna 2 Tablet(s) Oral at bedtime  spironolactone 25 milliGRAM(s) Oral daily      T(C): 37.1 (19 @ 05:30), Max: 37.1 (19 @ 05:30)  HR: 60 (19 @ 15:36) (58 - 68)  BP: 120/55 (19 @ 15:36) (107/43 - 184/70)  RR: 28 (19 @ 15:36) (12 - 39)  SpO2: 97% (19 @ 15:36) (82% - 98%)  Wt(kg): --     @ 07:01  -   @ 07:00  --------------------------------------------------------  IN: 1742.5 mL / OUT: 1325 mL / NET: 417.5 mL     @ 07:01  -   @ 15:42  --------------------------------------------------------  IN: 524.5 mL / OUT: 735 mL / NET: -210.5 mL        Review of Symptoms:  General: Alert to Person, Follows commands  Respiratory:  + SOB, + ARGUETA, No Cough, recent intubation  Cardiac: Denies CP, Denies Palpitations  Gastrointestinal: Denies Pain, Denies N/V  Extremities: Denies Pedal Edema, No Joint pain  Vascular: + claudication  Neurological: Acute delerium  Endocrine: negative      Physical Exam:  Gen: A/O to person ( does not know month or year, does not know what hospital he is in), NAD  HEENT: No JVD, Neck Supple, Trachea midline, No masses  Pulmonary: Fine crackles at bases,  No accessory muscle use for respiration  Cardiac: S1S2, RRR, No Gallops/Rubs  ECG: CHB  Gastrointestinal: Soft, NT/ND, + Bowel Sounds  Extremities: No Edema,  No joint pain or swelling +PMSx4  Vascular: 1+ pulses B/L, No Bruits  Neurological: Non Focal, = motor and sensory, Confused  Skin: Warm, Dry, Pink. Normal Turgor      Laboratory:                        11.1   10.9  )-----------( 183      ( 13 May 2019 02:11 )             33.4        140  |  100  |  17  ----------------------------<  168<H>  4.1   |  24  |  1.37<H>    Ca    8.8      13 May 2019 02:12  Phos  2.9       Mg     2.0         TPro  6.7  /  Alb  3.1<L>  /  TBili  1.9<H>  /  DBili  x   /  AST  16  /  ALT  9<L>  /  AlkPhos  113          Transthoracic Echo:    < from: Transthoracic Echocardiogram (19 @ 15:16) >  EF (Visual Estimate): 45 %  ------------------------------------------------------------------------  Observations:  Mitral Valve: Normal appearing mitral valve leaflets.  Mitral annular calcification.  Eccentric mitral regurgitation directed posteriorly,  probably severe.  Aortic Valve/Aorta: Mildly calcified aortic valve.  Normal aortic root size. (Ao: 3.5 cm at the sinuses of  Valsalva).  Left Atrium: Normal left atrium.  LA volume index = 28  cc/m2.  Left Ventricle: Estimated LV ejection fraction 45%.  Borderline left ventricular enlargement (EDV approximately  72 cc/m2).  Right Heart: Normal right atrium. Moderate right  ventricular enlargement. Study quality precludes accurate  assessment of right ventricular systolic function.  Normal  appearing tricuspid valve leaflets. At least moderate  tricuspid regurgitation. Normal pulmonic valve.  Pericardium/Pleura: Normal pericardium with trace  pericardial effusion.  Left pleural effusion.  Hemodynamic: Estimated right atrial pressure 15 mm Hg,  Severe pulmonary hypertension. Estimated PASP = 75 mmHg.  ------------------------------------------------------------------------  Conclusions:  Suboptimal apical windows.  Estimated LV ejection fraction 45%.  Eccentric mitral regurgitation directed posteriorly,  probably severe.  Moderate right ventricular enlargement. Study quality  precludes accurate assessment of right ventricular systolic  function.  Severe pulmonary hypertension. Estimated PASP = 75 mmHg.  ------------------------------------------------------------------------  Confirmed on  2019 - 18:04:38 by Juan Alberto Garcia M.D.    < end of copied text >      TransEsophageal Echo:    < from: Transesophageal Echocardiogram w/o TTE (05.10.19 @ 10:32) >  Observations:  Mitral Valve: Tethered mitral valve leaflets with normal  opening.  Dilated mitral annlus. Severe mitral  regurgitation.  MR ERO 0.65 cm sq  MR volume 79 ml.  Aortic Valve/Aorta: Trileaflet aortic valve.  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 Peak left  ventricular outflow tract gradient equals 3 mm Hg, mean  gradient is equal to 1 mm Hg, LVOT velocity time integral  equals 13 cm.  LVOT diameter: 2.2 cm.  Complex atheroma noted in aortic arch/descending aorta.  Left Atrium: Normal left atrium.  Left Ventricle: Mild to moderate  left ventricular systolic  dysfunction. Normal left ventricular internal dimensions  and wall thicknesses.  Right Heart: Severe right atrial enlargement.   A device  wire is noted in the right heart. Right ventricular  enlargement with decreased right ventricular systolic  function. Normal tricuspid valve. Moderate tricuspid  regurgitation. Normal pulmonic valve.  Pericardium/Pleura: Normal pericardium with no pericardial  effusion.  Left pleural effusion.  Hemodynamic: Estimated right atrial pressure is 8 mm Hg.  Estimated right ventricular systolic pressure [id number] mm  Hg, assuming right atrial pressure equals 8 mm Hg,  consistent with severe pulmonary hypertension. Color  Doppler demonstrates no evidence of a patent foramen ovale.  ------------------------------------------------------------------------  Conclusions:  1. Tethered mitral valve leaflets with normal opening.  Dilated mitral annlus. Severe mitral regurgitation.  MR ERO 0.65 cm sq  MR volume 79 ml.  2. Severe left atrial enlargement.    No left atrial or  left atrial appendage thrombus.  Decreased left atrial  appendage velocities noted.  3. Mild to moderate  left ventricular systolic dysfunction.  4. Severe right atrial enlargement.   A device wire is  noted in the right heart.  5. Right ventricular enlargement with decreased right  ventricular systolic function.  6. Normal tricuspid valve. Moderate tricuspid  regurgitation.  7. Estimated pulmonary artery systolic pressure equals 72  mm Hg, assuming right atrial pressure equals 8 mm Hg,  consistent with severe pulmonary pressures.  8. Left pleural effusion.    < end of copied text >    Cardiac Cath:    < from: Cardiac Cath Lab - Adult (05.10.19 @ 16:00) >  CORONARY VESSELS: The coronary circulation is right dominant.  LM:   --  LM: Normal.  LAD:   --  LAD: Angiography showed minor luminal irregularities with no  flow limiting lesions.  --  D1: There was a 70 % stenosis in the middle third of the vessel  segment.  CX:   --  Proximal circumflex: There was a 30 % stenosis.  --  Distal circumflex: There was a 60 % stenosis. There was a small  vascular territory distal to the lesion.  RCA:   --  Proximal RCA: There was a 40 % stenosis.  COMPLICATIONS: There were no complications.  DIAGNOSTIC IMPRESSIONS: Patient has severe pulmonary hypertension wih large  cv wave and mild CAD disease as descibed  DIAGNOSTIC RECOMMENDATIONS: surgical/ structiral heart team evaluation of  MR and evalution of worsening mental status  Prepared and signed by  Naren Loyd M.D.  Signed 05/10/2019 17:14:44  HEMODYNAMIC TABLES  Pressures:  Baseline  Pressures:  - HR: 41  Pressures:  - Rhythm:  Pressures:  -- Aortic Pressure (S/D/M): 105/60/79  Pressures:  -- Left Ventricle (s/edp): 136/25/--  Pressures:  -- Pulmonary Artery (S/D/M): 75/36/48  Pressures:  -- Pulmonary Capillary Wedge: 24/42/27  Pressures:  -- Right Atrium (a/v/M): 17/22/17  Pressures:  -- Right Ventricle (s/edp): 93/18/--  O2 Sats:  Baseline  O2 Sats:  - HR: 41  O2 Sats:  - Rhythm:  O2 Sats:  -- AO: 11.4/99/15.35  O2 Sats:  -- PA: 11.4/60.5/9.38  Outputs:  Baseline  Outputs:  -- CALCULATIONS[age]  Outputs:  -- CALCULATIONS: Body Surface Area: 2.19  Outputs:  -- CALCULATIONS: Height in cm: 183.00  Outputs:  -- CALCULATIONS: Sex: Male  Outputs:  -- CALCULATIONS: Weight in k.10  Outputs:  -- OUTPUTS: Blood Oxygen Difference: 5.97  Outputs:  -- OUTPUTS: CO by Aquiles: 4.92  Outputs:  -- OUTPUTS: Aquiles cardiac index: 2.24  Outputs:  -- OUTPUTS: O2 consumption: 293.46  Outputs:  -- OUTPUTS: Vo2 Indexed: 133.76    < end of copied text >

## 2019-05-13 NOTE — BEHAVIORAL HEALTH ASSESSMENT NOTE - HPI (INCLUDE ILLNESS QUALITY, SEVERITY, DURATION, TIMING, CONTEXT, MODIFYING FACTORS, ASSOCIATED SIGNS AND SYMPTOMS)
This is a 68-year-old CM patient,  but domiciled alone, retired  and worked in construction, with no known PPh and PMH Non-Hodgkin's Lymphoma tx with chemotherapy in 2004, DM2 with CKD stage 3, HTN, CHF EF 35% on cardiac cath 12/ 2016, pleural effusion s/p left pleuracentesis in 10/2016, cardiomyopathy, gout and HLD presents to the ED sent in from cardiologists office because of EKG changes. Psychiatry consulted to assess for confusion, altered mental status, and agitation with concern for vascular dementia.     According to the medical team, the patient was on BIPAP overnight, transitioned to nasal cannula today, and was extubated on Saturday, is confused at baseline, requires a one to one and needs constant reorientation. On exam, pt appears comfortable in bed in no acute distress and with no complaints. 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 Patient endorsed poor sleep. Pt reported feeling safe in the hospital. Difficult to obtain further history from pt.     Collateral obtained from pt wife and daughter. Wife states that patient has a history of a "personality of agitation" but does not believe the patient is at his baseline. She has not lived with the patient since 1992 and currently lives in [place] but speaks with him on a regular basis and believes he is more confused than normal- giving the example of him not remembering who his daughter was or that she visited him when they spoke last night. She believes that his agitation and confusion are worsened by his "lack of control of his surroundings". She denies any knowledge of previous episodes of confusion, psychiatric history, or suicidal/homicidal thoughts. His daughter, Vonnie, lives 15 minutes from him and is in constant contact with her father, and she feels that he is alternating between his baseline and periods of agitation. She attributes his presentation to anxiety for being away from his home. Vonnie reports that the patient was at a fully functional level caring for all ADLs until this hospitalization except for a couple of instances of confusion for the past couple of months where he would call her confused as to where she was, but had no other symptoms.

## 2019-05-13 NOTE — PROGRESS NOTE ADULT - ASSESSMENT
68 year old Non-Hodgkin's Lymphoma tx with chemotherapy in 2004, DM2 with CKD stage 3, HTN, CHF EF 35% on cardiac cath 12/ 2016, pleural effusion s/p left pleuracentesis in 10/2016, cardiomyopathy, gout and HLD presents to the ED sent in from cardiologists office because of AMS    # Respi failure/apnea/hypoxia-  improved sp Extubated  CxR-unchanged rt loculated small effusion, mild interstitial edema    # CHB sp micra ppm implant 5/10   CCU mgmt  cards and EP fu    #Acute encephalopathy. ?etiology   r/o infectious etiology  r/o lymphoma mets to brain /meninges  r/o early dementia  -chronic parietal infarct in CT head   MR brain reviewed  , neuro cs fu  vitb12, folate, iron panel, rpr and tsh levels wnl  ?LP when feasible  psych cs noted- depakote    # Fevers- afebrile  UA neg, CXR neg, bld cx ngtd  ?need for LP  fu ID recs  cont zosyn  -  # Chronic systolic heart failure.    - TTE EF 45%, likely severe MR, moderately enlarged RV , severe pulmonary hypertension. Estimated PASP = 75 mmHg.  - Medication non compliance  - Holding antihypertensives given CHB  - started furosemide w/ I+Os given signs of volume overload.   CT sx cs noted for severe MR eval    # Non-Hodgkin lymphoma of lymph nodes of neck, unspecified non-Hodgkin lymphoma type.  Plan: - treated with chemotherapy in 2014.   Anemia-monitor h/h  Onc cs fu noted    # Essential hypertension.  Plan: - bp stable  - c/w arb and beta blocker.     # Type 2 diabetes mellitus with chronic kidney disease, without long-term current use of insulin, unspecified CKD stage. Plan: - fs achs  - fs controlled  - start sliding scale insulin  - h    # Chronic gout without tophus, unspecified cause, unspecified site.   - c/w allopurinol 300 mg daily.

## 2019-05-13 NOTE — PROGRESS NOTE ADULT - SUBJECTIVE AND OBJECTIVE BOX
====================  CCU MIDNIGHT ROUNDS  ====================    NIK GRIMM  [id number]  Patient is a 68y old  Male who presents with a chief complaint of confusion (13 May 2019 18:19)    ====================  SUMMARY: 67 yo Non-Hodgkin's Lymphoma tx w/ chemo in 2004, DM2 w/ CKD stage 3, HTN, CHF EF 35% on cardiac cath 12/ 2016, pleural effusion s/p left pleuracentesis in 10/2016, cardiomyopathy, gout and HLD presents to the ED sent in from cardiologists office because of EKG changes on 5/8. Pt noted to be in 2nd degree w/ RBBB alternating w/ LBBB and was intubated for airway protection. ECHO severe MR. Pt was taken for RHC/LHC and shown to have elevated filling pressures and CAD however nonobstructive. Pt diuresed w/ lasix. On 5/10 pt underwent Micra PPM w/ VVI 60. On 5/11 pt was extubated.   ====================        ====================  NEW EVENTS: Remains on 1:1. Resting comfortably overnight.   ====================        ====================  Vital Signs (last 12 hrs):  ====================    T(C): 36.8 (05-13-19 @ 19:00), Max: 36.8 (05-13-19 @ 19:00)  T(F): 98.2 (05-13-19 @ 19:00), Max: 98.2 (05-13-19 @ 19:00)  HR: 60 (05-13-19 @ 22:00) (58 - 62)  BP: 121/49 (05-13-19 @ 22:00) (92/34 - 135/52)  BP(mean): 74 (05-13-19 @ 22:00) (49 - 96)  ABP: --  ABP(mean): --  RR: 15 (05-13-19 @ 22:00) (14 - 28)  SpO2: 98% (05-13-19 @ 22:00) (94% - 100%)  Wt(kg): --  CVP(mm Hg): --  CVP(cm H2O): --  CO: --  CI: --  PA: --  PA(mean): --  PCWP: --  SVR: --  PVR: --    I&O's Summary    12 May 2019 07:01  -  13 May 2019 07:00  --------------------------------------------------------  IN: 1742.5 mL / OUT: 1325 mL / NET: 417.5 mL    13 May 2019 07:01  -  13 May 2019 22:58  --------------------------------------------------------  IN: 584.5 mL / OUT: 1050 mL / NET: -465.5 mL            ====================  NEW LABS:  ====================                        11.1   10.9  )-----------( 183      ( 13 May 2019 02:11 )             33.4     05-13    140  |  100  |  17  ----------------------------<  168<H>  4.1   |  24  |  1.37<H>    Ca    8.8      13 May 2019 02:12  Phos  2.9     05-13  Mg     2.0     05-13    TPro  6.7  /  Alb  3.1<L>  /  TBili  1.9<H>  /  DBili  x   /  AST  16  /  ALT  9<L>  /  AlkPhos  113  05-13                ====================  Assessment & Plan:    ====================

## 2019-05-13 NOTE — PROGRESS NOTE ADULT - SUBJECTIVE AND OBJECTIVE BOX
Patient seenn 5/10 and 5/11  cath 5/9 severepulonary hypertension large cv wave mild branch CAD, s/p damir soliman  extubated5/10  awake alert still confused  denies cp or sob    MEDICATIONS:  MEDICATIONS  (STANDING):  allopurinol 300 milliGRAM(s) Oral daily  aspirin  chewable 81 milliGRAM(s) Oral daily  chlorhexidine 4% Liquid 1 Application(s) Topical <User Schedule>  dextrose 5%. 1000 milliLiter(s) (50 mL/Hr) IV Continuous <Continuous>  dextrose 50% Injectable 12.5 Gram(s) IV Push once  docusate sodium 100 milliGRAM(s) Oral three times a day  folic acid 1 milliGRAM(s) Oral daily  heparin  Injectable 5000 Unit(s) SubCutaneous every 8 hours  hydrALAZINE 100 milliGRAM(s) Oral every 8 hours  insulin lispro (HumaLOG) corrective regimen sliding scale   SubCutaneous three times a day before meals  isosorbide   dinitrate Tablet (ISORDIL) 40 milliGRAM(s) Oral every 8 hours  nitroglycerin  Infusion 16.667 MICROgram(s)/Min (5 mL/Hr) IV Continuous <Continuous>  piperacillin/tazobactam IVPB. 3.375 Gram(s) IV Intermittent every 8 hours  senna 2 Tablet(s) Oral at bedtime  spironolactone 25 milliGRAM(s) Oral daily      PHYSICAL EXAM:  T(C): 37.1 (05-13-19 @ 05:30), Max: 37.1 (05-13-19 @ 05:30)  HR: 60 (05-13-19 @ 06:45) (58 - 62)  BP: 167/54 (05-13-19 @ 06:45) (139/55 - 181/63)  RR: 24 (05-13-19 @ 06:45) (13 - 44)  SpO2: 94% (05-13-19 @ 06:45) (82% - 100%)  Wt(kg): --  I&O's Summary    12 May 2019 07:01  -  13 May 2019 07:00  --------------------------------------------------------  IN: 1742.5 mL / OUT: 1325 mL / NET: 417.5 mL          Appearance: Normal	  HEENT:   Normal oral mucosa, PERRL, EOMI	  Cardiovascular: Normal S1 S2, 2/6 murmur at apex to axilla  Respiratory: Lungs clear decreased BS 	  Gastrointestinal:  Soft, Non-tender, + BS	  Skin: No rashes, No ecchymoses, No cyanosis, warm to touch  Musculoskeletal: Normal range of motion, normal strength  Psychiatry:  Mood & affect appropriate  Ext: No edema  Peripheral pulses palpable 2+ bilaterally      LABS:    CARDIAC MARKERS:                                11.1   10.9  )-----------( 183      ( 13 May 2019 02:11 )             33.4     05-13    140  |  100  |  17  ----------------------------<  168<H>  4.1   |  24  |  1.37<H>    Ca    8.8      13 May 2019 02:12  Phos  2.9     05-13  Mg     2.0     05-13    TPro  6.7  /  Alb  3.1<L>  /  TBili  1.9<H>  /  DBili  x   /  AST  16  /  ALT  9<L>  /  AlkPhos  113  05-13    proBNP:   Lipid Profile:   HgA1c:   TSH:

## 2019-05-13 NOTE — BEHAVIORAL HEALTH ASSESSMENT NOTE - NSBHCHARTREVIEWLAB_PSY_A_CORE FT
11.1   10.9  )-----------( 183      ( 13 May 2019 02:11 )             33.4     05-13    140  |  100  |  17  ----------------------------<  168<H>  4.1   |  24  |  1.37<H>    Ca    8.8      13 May 2019 02:12  Phos  2.9     05-13  Mg     2.0     05-13    TPro  6.7  /  Alb  3.1<L>  /  TBili  1.9<H>  /  DBili  x   /  AST  16  /  ALT  9<L>  /  AlkPhos  113  05-13

## 2019-05-13 NOTE — PROGRESS NOTE ADULT - SUBJECTIVE AND OBJECTIVE BOX
Patient is a 68y old  Male who presents with a chief complaint of confusion (08 May 2019 12:11)      HPI:  ** Patient poor historian **    Patient is a 68 year old Non-Hodgkin's Lymphoma tx with chemotherapy in , DM2 with CKD stage 3, HTN, CHF EF 35% on cardiac cath 2016, pleural effusion s/p left pleuracentesis in 10/2016, cardiomyopathy, gout and HLD presents to the ED sent in from cardiologists office because of EKG changes. Patient is not sure why he is here. He was not sure where he was, why he is here or what even the year is. He remarks that he retired a few months ago and he stopped taking his medications then because he "wasn't feeling good". He states he was wife his wife earlier but then later he states shes in [place]. Patient currently denies chest pain, shortness of breath, palpitations, syncope, fevers, chills, recent travel or sick contacts. No new meds however he has stopped taking most of his meds. He overall, feels fine and is not sure why he is in the hospital.     It's very difficult to get a clear history from patient. I have tried to reach his daughter but have not received a call back as of yet. I have called [facility] patients pharmacy and he states patient picked up furosemide and irbersartan in April but has not picked up any other meds in months. Mentation in  AAOX3.    In the ED, VSS. Labs at baseline, CT head with old stroke, Trops elevated and peaked at 50, now normal, EKG with TWI in lateral leads. (08 May 2019 10:33)    5 as the above record indicates, the pt is here and is a very poor historian. I was asked by Dr Hawkins from oncology and by the pt's internist to see the pt as he did have a past of lymphoma. The oncology office will have to look up records as his history is not readily available. When I came to see the pt he was not able to supply much info about his cancer other than he had lymphoma and did not know the type or who treated him. Dr Hawkins thinks he may have been treated by his previous associate, Dr Sarmiento.     At the time of my visit the pt was alert and oriented but was not able to give specific details about any of his medical issues. His chemistries appeared all unremarkable and he was not febrile and his ct of the head showed nothing of note.  I will see if there are any records on his previous onc history and see if there is any reason to think it may have contributed to his present admit here.  events of last day was noted and pt was intubated and had heart failure. The records were reviewed at [facility] and pt in  had a large cell lymphoma and was treated with R CHOP. [place] [facility] [place] for a recurrence with BR and he has remained in remission. 5/10 still intubated and sedated and labs reviewed and thus far no direct evidence for lymphoma recurrence.  Pt still intubated and sedated. Anemia from icu anemia causes no evidence for recurrent lymphoma.  the pt was extubated on this date and hemoglobin was noted to be 11.0 will look when stable for any evidenced of recurrent lymphoma.  notes reviewed and pt still looked somewhat disoriented will check him for nodes in the am counts ok.  MEDICATIONS  (STANDING):  allopurinol 300 milliGRAM(s) Oral daily  aspirin  chewable 81 milliGRAM(s) Oral daily  carvedilol 12.5 milliGRAM(s) Oral every 12 hours  chlorhexidine 4% Liquid 1 Application(s) Topical <User Schedule>  dextrose 5%. 1000 milliLiter(s) (50 mL/Hr) IV Continuous <Continuous>  dextrose 50% Injectable 12.5 Gram(s) IV Push once  docusate sodium 100 milliGRAM(s) Oral three times a day  folic acid 1 milliGRAM(s) Oral daily  heparin  Injectable 5000 Unit(s) SubCutaneous every 8 hours  hydrALAZINE 100 milliGRAM(s) Oral every 8 hours  insulin lispro (HumaLOG) corrective regimen sliding scale   SubCutaneous three times a day before meals  isosorbide   dinitrate Tablet (ISORDIL) 40 milliGRAM(s) Oral every 8 hours  nitroglycerin  Infusion 16.667 MICROgram(s)/Min (5 mL/Hr) IV Continuous <Continuous>  piperacillin/tazobactam IVPB. 3.375 Gram(s) IV Intermittent every 8 hours  senna 2 Tablet(s) Oral at bedtime  spironolactone 25 milliGRAM(s) Oral daily                        11.1   10.9  )-----------( 183      ( 13 May 2019 02:11 )             33.4     138  |  98  |  16  ----------------------------<  121<H>  3.5   |  28  |  1.42<H>    Ca    8.6      12 May 2019 05:10  Phos  3.6     05-12  Mg     2.2     05-12    TPro  6.5  /  Alb  3.0<L>  /  TBili  1.6<H>  /  DBili  x   /  AST  12  /  ALT  9<L>  /  AlkPhos  108  05-12    Urinalysis Basic - ( 08 May 2019 00:17 )    Color: Yellow / Appearance: Slightly Turbid / S.023 / pH: x  Gluc: x / Ketone: Negative  / Bili: Small / Urobili: 3 mg/dL   Blood: x / Protein: 300 mg/dL / Nitrite: Negative   Leuk Esterase: Negative / RBC: 2 /hpf / WBC 7 /HPF   Sq Epi: x / Non Sq Epi: 1 /hpf / Bacteria: Negative        ROS:  Negative except for:    PAST MEDICAL & SURGICAL HISTORY:  Pleural effusion  Moderate mitral regurgitation  Systolic heart failure  Rhinitis, allergic  Essential hypertension  DM type 2 (diabetes mellitus, type 2)  Non-Hodgkins Lymphoma  Non-Hodgkin lymphoma: h/o axillary dissection      SOCIAL HISTORY:    FAMILY HISTORY:  Family history of non-Hodgkin's lymphoma (Sibling)  Family history of CHF (congestive heart failure)      Allergies    No Known Allergies    Intolerances        PHYSICAL EXAM  General: extubated and was being cared for by the staff upon my arrival looked somewhat confused   HEENT: stable   Neck: supple  CV: rr   Lungs: decreased breath sounds at the bases  Abdomen: soft non-tender non-distended, no hepatosplenomegaly  Ext: no clubbing cyanosis or edema  Skin: no rashes and no petechiae  Neuro: alert and oriented X3 no focal deficits    BLOOD SMEAR INTERPRETATION:    RADIOLOGY :

## 2019-05-13 NOTE — BEHAVIORAL HEALTH ASSESSMENT NOTE - NSBHCONSULTMEDS_PSY_A_CORE FT
Depakote ER 250mg p.o. BID (prolonged QTc precludes the use of neuroleptics), monitor LRTs  melatonin 3mg p.o. at bedtime

## 2019-05-14 LAB
ALBUMIN SERPL ELPH-MCNC: 3.2 G/DL — LOW (ref 3.3–5)
ALP SERPL-CCNC: 103 U/L — SIGNIFICANT CHANGE UP (ref 40–120)
ALT FLD-CCNC: 10 U/L — SIGNIFICANT CHANGE UP (ref 10–45)
ANION GAP SERPL CALC-SCNC: 15 MMOL/L — SIGNIFICANT CHANGE UP (ref 5–17)
AST SERPL-CCNC: 17 U/L — SIGNIFICANT CHANGE UP (ref 10–40)
BASOPHILS # BLD AUTO: 0 K/UL — SIGNIFICANT CHANGE UP (ref 0–0.2)
BASOPHILS NFR BLD AUTO: 0.1 % — SIGNIFICANT CHANGE UP (ref 0–2)
BILIRUB SERPL-MCNC: 2.3 MG/DL — HIGH (ref 0.2–1.2)
BUN SERPL-MCNC: 18 MG/DL — SIGNIFICANT CHANGE UP (ref 7–23)
CALCIUM SERPL-MCNC: 8.8 MG/DL — SIGNIFICANT CHANGE UP (ref 8.4–10.5)
CHLORIDE SERPL-SCNC: 99 MMOL/L — SIGNIFICANT CHANGE UP (ref 96–108)
CO2 SERPL-SCNC: 29 MMOL/L — SIGNIFICANT CHANGE UP (ref 22–31)
CREAT SERPL-MCNC: 1.57 MG/DL — HIGH (ref 0.5–1.3)
CULTURE RESULTS: SIGNIFICANT CHANGE UP
EOSINOPHIL # BLD AUTO: 0.2 K/UL — SIGNIFICANT CHANGE UP (ref 0–0.5)
EOSINOPHIL NFR BLD AUTO: 3.1 % — SIGNIFICANT CHANGE UP (ref 0–6)
GLUCOSE BLDC GLUCOMTR-MCNC: 109 MG/DL — HIGH (ref 70–99)
GLUCOSE BLDC GLUCOMTR-MCNC: 123 MG/DL — HIGH (ref 70–99)
GLUCOSE BLDC GLUCOMTR-MCNC: 134 MG/DL — HIGH (ref 70–99)
GLUCOSE BLDC GLUCOMTR-MCNC: 224 MG/DL — HIGH (ref 70–99)
GLUCOSE SERPL-MCNC: 120 MG/DL — HIGH (ref 70–99)
HCT VFR BLD CALC: 33.5 % — LOW (ref 39–50)
HGB BLD-MCNC: 10.9 G/DL — LOW (ref 13–17)
LYMPHOCYTES # BLD AUTO: 0.7 K/UL — LOW (ref 1–3.3)
LYMPHOCYTES # BLD AUTO: 8.9 % — LOW (ref 13–44)
MAGNESIUM SERPL-MCNC: 2 MG/DL — SIGNIFICANT CHANGE UP (ref 1.6–2.6)
MCHC RBC-ENTMCNC: 28.5 PG — SIGNIFICANT CHANGE UP (ref 27–34)
MCHC RBC-ENTMCNC: 32.5 GM/DL — SIGNIFICANT CHANGE UP (ref 32–36)
MCV RBC AUTO: 87.7 FL — SIGNIFICANT CHANGE UP (ref 80–100)
MONOCYTES # BLD AUTO: 0.4 K/UL — SIGNIFICANT CHANGE UP (ref 0–0.9)
MONOCYTES NFR BLD AUTO: 5.8 % — SIGNIFICANT CHANGE UP (ref 2–14)
NEUTROPHILS # BLD AUTO: 6.1 K/UL — SIGNIFICANT CHANGE UP (ref 1.8–7.4)
NEUTROPHILS NFR BLD AUTO: 82.2 % — HIGH (ref 43–77)
PHOSPHATE SERPL-MCNC: 3.5 MG/DL — SIGNIFICANT CHANGE UP (ref 2.5–4.5)
PLATELET # BLD AUTO: 177 K/UL — SIGNIFICANT CHANGE UP (ref 150–400)
POTASSIUM SERPL-MCNC: 3.3 MMOL/L — LOW (ref 3.5–5.3)
POTASSIUM SERPL-SCNC: 3.3 MMOL/L — LOW (ref 3.5–5.3)
PROT SERPL-MCNC: 6.6 G/DL — SIGNIFICANT CHANGE UP (ref 6–8.3)
RBC # BLD: 3.82 M/UL — LOW (ref 4.2–5.8)
RBC # FLD: 15.2 % — HIGH (ref 10.3–14.5)
SODIUM SERPL-SCNC: 143 MMOL/L — SIGNIFICANT CHANGE UP (ref 135–145)
SPECIMEN SOURCE: SIGNIFICANT CHANGE UP
WBC # BLD: 7.4 K/UL — SIGNIFICANT CHANGE UP (ref 3.8–10.5)
WBC # FLD AUTO: 7.4 K/UL — SIGNIFICANT CHANGE UP (ref 3.8–10.5)

## 2019-05-14 PROCEDURE — 99223 1ST HOSP IP/OBS HIGH 75: CPT | Mod: GC

## 2019-05-14 PROCEDURE — 99233 SBSQ HOSP IP/OBS HIGH 50: CPT

## 2019-05-14 RX ORDER — FUROSEMIDE 40 MG
40 TABLET ORAL ONCE
Refills: 0 | Status: COMPLETED | OUTPATIENT
Start: 2019-05-14 | End: 2019-05-14

## 2019-05-14 RX ADMIN — Medication 40 MILLIGRAM(S): at 01:51

## 2019-05-14 RX ADMIN — Medication 81 MILLIGRAM(S): at 12:39

## 2019-05-14 RX ADMIN — Medication 1 MILLIGRAM(S): at 12:39

## 2019-05-14 RX ADMIN — HEPARIN SODIUM 5000 UNIT(S): 5000 INJECTION INTRAVENOUS; SUBCUTANEOUS at 22:05

## 2019-05-14 RX ADMIN — Medication 100 MILLIGRAM(S): at 01:13

## 2019-05-14 RX ADMIN — Medication 100 MILLIGRAM(S): at 14:47

## 2019-05-14 RX ADMIN — ISOSORBIDE DINITRATE 40 MILLIGRAM(S): 5 TABLET ORAL at 13:19

## 2019-05-14 RX ADMIN — PIPERACILLIN AND TAZOBACTAM 25 GRAM(S): 4; .5 INJECTION, POWDER, LYOPHILIZED, FOR SOLUTION INTRAVENOUS at 05:38

## 2019-05-14 RX ADMIN — ISOSORBIDE DINITRATE 40 MILLIGRAM(S): 5 TABLET ORAL at 23:50

## 2019-05-14 RX ADMIN — HEPARIN SODIUM 5000 UNIT(S): 5000 INJECTION INTRAVENOUS; SUBCUTANEOUS at 14:46

## 2019-05-14 RX ADMIN — CARVEDILOL PHOSPHATE 12.5 MILLIGRAM(S): 80 CAPSULE, EXTENDED RELEASE ORAL at 22:05

## 2019-05-14 RX ADMIN — Medication 2: at 17:54

## 2019-05-14 RX ADMIN — Medication 100 MILLIGRAM(S): at 22:04

## 2019-05-14 RX ADMIN — Medication 300 MILLIGRAM(S): at 12:39

## 2019-05-14 RX ADMIN — Medication 100 MILLIGRAM(S): at 23:51

## 2019-05-14 RX ADMIN — DIVALPROEX SODIUM 250 MILLIGRAM(S): 500 TABLET, DELAYED RELEASE ORAL at 23:50

## 2019-05-14 RX ADMIN — Medication 40 MILLIGRAM(S): at 17:14

## 2019-05-14 RX ADMIN — DIVALPROEX SODIUM 250 MILLIGRAM(S): 500 TABLET, DELAYED RELEASE ORAL at 12:41

## 2019-05-14 RX ADMIN — SPIRONOLACTONE 25 MILLIGRAM(S): 25 TABLET, FILM COATED ORAL at 12:42

## 2019-05-14 RX ADMIN — PIPERACILLIN AND TAZOBACTAM 25 GRAM(S): 4; .5 INJECTION, POWDER, LYOPHILIZED, FOR SOLUTION INTRAVENOUS at 14:46

## 2019-05-14 RX ADMIN — CARVEDILOL PHOSPHATE 12.5 MILLIGRAM(S): 80 CAPSULE, EXTENDED RELEASE ORAL at 12:39

## 2019-05-14 RX ADMIN — Medication 3 MILLIGRAM(S): at 23:50

## 2019-05-14 RX ADMIN — Medication 40 MILLIGRAM(S): at 05:39

## 2019-05-14 NOTE — CONSULT NOTE ADULT - SUBJECTIVE AND OBJECTIVE BOX
Patient seen and evaluated at bedside on 3 DSU    Outpatient Cardiologist: Dr. Naren Loyd    Chief Complaint: Sent in from his Cardiologist's Office for AMS    Note: The patient is currently AO x 1 and is a poor historian. Also spoke with the patient's daughter who was not able to provide details regarding his history    HPI: 69M w/ PMHx of NHL (treated with R-CHOP , BR in ), chronic HFrEF (likely 2/2 Doxorubicin, EF:45%, decreased RVSF) who was sent to the ED on  with AMS by Dr. Loyd and 2:1 HB. The patient is currently AOx1. Per chart review, during the admission the patient was found to have CHB requiring TVP.  He was intubated in the setting of agitation.  A CT Head showed a chronic left parietal infarct. KUSHAL showed severe MR 2/2 a dilated MV annulus. Also w/ PASP:72. He underwent a R + LHC - results below and implantation of a leadless pacemaker. HF called to follow during w/u for MitraClip. The patient has no complaints currently. Per documentation he has told other providers that he was SOB with moderate activity including climbing stairs. Also has c/o claudication to both lower extremities x months.      PMHx:   Chronic HFrEF (EF from 2016 was 45%)  HTN  NHL (per Onc note Large cell lymphoma treated with R-CHOP. In  he went to [facility] [place] [facility] and was treated for a recurrence with BR and he has remained in remission)  DM2  Gout  CKD III    PSHx: No significant past surgical history    Allergies: No Known Allergies    Home Medications:  allopurinol 300 mg oral tablet: 1 tab(s) orally once a day (08 May 2019 09:57)  Aspirin Enteric Coated 81 mg oral delayed release tablet: 1 tab(s) orally once a day (08 May 2019 09:57)  colchicine 0.6 mg oral tablet: 1 tab(s) orally every other day (08 May 2019 09:57)  glimepiride 2 mg oral tablet: 1 tab(s) orally once a day (08 May 2019 09:57)  irbesartan 150 mg oral tablet: 1 tab(s) orally once a day (08 May 2019 09:57)  Lasix 20 mg oral tablet: 1 tab(s) orally 2 times a day (08 May 2019 09:57)  metoprolol succinate 25 mg oral tablet, extended release: 1 tab(s) orally once a day (08 May 2019 09:57)  spironolactone 25 mg oral tablet: 1 tab(s) orally once a day (08 May 2019 09:57)    Current Medications:   ALBUTerol/ipratropium for Nebulization 3 milliLiter(s) Nebulizer every 6 hours PRN  allopurinol 300 milliGRAM(s) Oral daily  aspirin  chewable 81 milliGRAM(s) Oral daily  carvedilol 12.5 milliGRAM(s) Oral every 12 hours  diVALproex  milliGRAM(s) Oral two times a day  docusate sodium 100 milliGRAM(s) Oral three times a day  folic acid 1 milliGRAM(s) Oral daily  furosemide   Injectable 40 milliGRAM(s) IV Push every 12 hours  heparin  Injectable 5000 Unit(s) SubCutaneous every 8 hours  hydrALAZINE 100 milliGRAM(s) Oral every 8 hours  insulin lispro (HumaLOG) corrective regimen sliding scale   SubCutaneous three times a day before meals  isosorbide   dinitrate Tablet (ISORDIL) 40 milliGRAM(s) Oral every 8 hours  melatonin 3 milliGRAM(s) Oral at bedtime  piperacillin/tazobactam IVPB. 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  Smoking History: Prior smoker, quit 5 yrs ago, smoked for 20 years 2 PPD  Alcohol Use: Denied  Drug Use: Denied    REVIEW OF SYSTEMS: Unable to obtain as the patient is currently AOx1    Physical Exam:  T(F): 98.5 (05-14), Max: 98.5 (05-14)  HR: 60 (05-14) (58 - 68)  BP: 144/68 (05-14) (75/40 - 184/70) - most recent SBPs in the 100s - 120s  RR: 20 (05-14)  SpO2: 96% on RA    GENERAL: AOx1 (name), pleasant, breathing comfortably on RA  HEAD:  Atraumatic, Normocephalic  ENT: EOMI, PERRLA, conjunctiva and sclera clear, Neck supple, JVD not elevated, moist mucosa  CHEST/LUNG: bibasilar rales  BACK: No spinal tenderness  HEART: Regular rate and rhythm; 2/6 systolic murmur at the apex, no rubs, or gallops  ABDOMEN: Soft, Nontender, Nondistended; Bowel sounds present  EXTREMITIES:  No clubbing, cyanosis, + b/l LE edema, LE warm to touch  PSYCH: Nl behavior, nl affect  SKIN: Normal color, No rashes or lesions  LINES: PIV, Peralta    Cardiovascular Diagnostic Testing:    Tele: sinus w/ CBH and V pacing @ 60 bpm    ECG: Personally reviewed: from : sinus w/ 2:1 HB and RBBB    Echo: < from: Transesophageal Echocardiogram w/o TTE (05.10.19 @ 10:32) >  Observations: Mitral Valve: Tethered mitral valve leaflets with normal opening.  Dilated mitral annulus. Severe mitral regurgitation. MR ERO 0.65 cm sq MR volume 79 ml. Aortic Valve/Aorta: Trileaflet aortic valve. 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 Peak left ventricular outflow tract gradient equals 3 mm Hg, mean gradient is equal to 1 mm Hg, LVOT velocity time integral equals 13 cm. LVOT diameter: 2.2 cm. Complex atheroma noted in aortic arch/descending aorta. Left Atrium: Normal left atrium. Left Ventricle: Mild to moderate  left ventricular systolic dysfunction. Normal left ventricular internal dimensions and wall thicknesses. Right Heart: Severe right atrial enlargement.   A device wire is noted in the right heart. Right ventricular enlargement with decreased right ventricular systolic function. Normal tricuspid valve. Moderate tricuspid regurgitation. Normal pulmonic valve. Pericardium/Pleura: Normal pericardium with no pericardial effusion. Left pleural effusion. Hemodynamic: Estimated right atrial pressure is 8 mm Hg. Estimated right ventricular systolic pressure equals 72 mm Hg, assuming right atrial pressure equals 8 mm Hg, consistent with severe pulmonary hypertension. Color Doppler demonstrates no evidence of a patent foramen ovale. Conclusions: 1. Tethered mitral valve leaflets with normal opening. Dilated mitral annulus. Severe mitral regurgitation. MR ERO 0.65 cm sq MR volume 79 ml. 2. Severe left atrial enlargement.    No left atrial or left atrial appendage thrombus.  Decreased left atrial appendage velocities noted. 3. Mild to moderate  left ventricular systolic dysfunction. 4. Severe right atrial enlargement.   A device wire is noted in the right heart. 5. Right ventricular enlargement with decreased right ventricular systolic function. 6. Normal tricuspid valve. Moderate tricuspid regurgitation. 7. Estimated pulmonary artery systolic pressure equals 72 mm Hg, assuming right atrial pressure equals 8 mm Hg, consistent with severe pulmonary pressures. 8. Left pleural effusion.  < end of copied text >    < from: Transthoracic Echocardiogram (19 @ 15:16) >  Dimensions:    Normal Values:  LA:     4.5    2.0 - 4.0 cm  Ao:     3.5    2.0 - 3.8 cm  SEPTUM: 0.9    0.6 - 1.2 cm  PWT:1.1    0.6 - 1.1 cm  LVIDd:  5.5    3.0 - 5.6 cm  LVIDs:         1.8 - 4.0 cm  Derived variables:  LVMI: 97 g/m2  RWT: 0.40  EF (Visual Estimate): 45 %  ------------------------------------------------------------------------  Observations: Mitral Valve: Normal appearing mitral valve leaflets. Mitral annular calcification. Eccentric mitral regurgitation directed posteriorly, probably severe. Aortic Valve/Aorta: Mildly calcified aortic valve. Normal aortic root size. (Ao: 3.5 cm at the sinuses of Valsalva). Left Atrium: Normal left atrium.  LA volume index = 28 cc/m2. Left Ventricle: Estimated LV ejection fraction 45%. Borderline left ventricular enlargement (EDV approximately 72 cc/m2). Right Heart: Normal right atrium. Moderate right ventricular enlargement. Study quality precludes accurate assessment of right ventricular systolic function.  Normal appearing tricuspid valve leaflets. At least moderate tricuspid regurgitation. Normal pulmonic valve. Pericardium/Pleura: Normal pericardium with trace pericardial effusion. Left pleural effusion. Hemodynamic: Estimated right atrial pressure 15 mm Hg, Severe pulmonary hypertension. Estimated PASP = 75 mmHg. Conclusions: Suboptimal apical windows. Estimated LV ejection fraction 45%. Eccentric mitral regurgitation directed posteriorly, probably severe. Moderate right ventricular enlargement. Study qualityprecludes accurate assessment of right ventricular systolic function. Severe pulmonary hypertension. Estimated PASP = 75 mmHg.  < end of copied text >    Cath: < from: Cardiac Cath Lab - Adult (05.10.19 @ 16:00) > CORONARY VESSELS: The coronary circulation is right dominant. LM:   --  LM: Normal. LAD:   --  LAD: Angiography showed minor luminal irregularities with no flow limiting lesions. --  D1: There was a 70 % stenosis in the middle third of the vessel segment. CX:   --  Proximal circumflex: There was a 30 % stenosis. --  Distal circumflex: There was a 60 % stenosis. There was a small vascular territory distal to the lesion. RCA:   --  Proximal RCA: There was a 40 % stenosis. COMPLICATIONS: There were no complications. Pressures:  -- Aortic Pressure (S/D/M): 105/60/79 Pressures:  -- Left Ventricle (s/edp): 136/25/-- Pressures:  -- Pulmonary Artery (S/D/M): 75/36/48 Pressures:  -- Pulmonary Capillary Wedge: 24/42/27 Pressures:  -- Right Atrium (a/v/M): 17/22/17 Pressures:  -- Right Ventricle (s/edp): 93/18/-- O2 Sats:  Baseline O2 Sats:  - HR: 41 O2 Sats:  - Rhythm: O2 Sats:  -- AO: 11.4/99/15.35 O2 Sats:  -- PA: 11.4/60.5/9.38 Outputs:  Baseline Outputs:  -- CALCULATIONS[age]  Outputs:  -- OUTPUTS: CO by Aquiles: 4.92 Outputs:  -- OUTPUTS: Aquiles cardiac index: 2.24 Outputs:  -- RESISTANCES: Pulmonary vascular resistance (Wood Units): 4.27 Outputs:  -- RESISTANCES: Systemic vascular index (dsc): 2212.79  < end of copied text >    < from: Cardiac Cath Lab - Adult (12.15.16 @ 15:49) > VENTRICLES: Global left ventricular function was severely depressed. EF estimated was 35 % and EF by echo was 45 %. CORONARY VESSELS: The coronary circulation is right dominant. LM:   --  LM: Angiography showed minor luminal irregularities with no flow limiting lesions. LAD:   --  LAD: Angiography showed minor luminal irregularities with no flow limiting lesions. CX:   --  Proximal circumflex: There was a 30 % stenosis. --  Distal circumflex: There was a 70 % stenosis. The lesion was eccentric. small distal therapy RI:   --  Ramus intermedius: Normal. RCA:   --  RCA: Angiography showed minor luminal irregularities with no flow limiting lesions. COMPLICATIONS: There were no complications. < end of copied text >    Imaging:    CXR: Personally reviewed: from : poor inspiratory effort, b/l PLEFs, and edema    < from: CT Head No Cont (19 @ 21:50) > IMPRESSION:  No evidence of acute intracranial hemorrhage, midline shift, or  hydrocephalus.  Chronic right parietal lobe infarct.  < end of copied text >    < from: MR Head No Cont (19 @ 19:19) >  IMPRESSION: Evidence of an old cortically based infarct in the right  lateral temporal parietal region with encephalomalacia and gliosis as  well as some trace old blood products.. No evidence of acute pathology.  Age-appropriate involutional changes.  < end of copied text >    Labs: Personally reviewed                        10.9   7.4   )-----------( 177      ( 14 May 2019 06:56 )             33.5     05-    140  |  100  |  17  ----------------------------<  168<H>  4.1   |  24  |  1.37<H>    Ca    8.8      13 May 2019 02:12  Phos  2.9     -  Mg     2.0         TPro  6.7  /  Alb  3.1<L>  /  TBili  1.9<H>  /  DBili  x   /  AST  16  /  ALT  9<L>  /  AlkPhos  113  05-13    CARDIAC MARKERS ( 09 May 2019 05:09 )  x     / x     / x     / 75 U/L / x     / x      CARDIAC MARKERS ( 08 May 2019 02:09 )  50 ng/L / x     / x     / x     / x     / x      CARDIAC MARKERS ( 07 May 2019 23:35 )  57 ng/L / x     / x     / 85 U/L / x     / 2.8 ng/mL  CARDIAC MARKERS ( 07 May 2019 22:10 )  56 ng/L / x     / x     / x     / x     / x                Hemoglobin A1C, Whole Blood: 6.0 % ( @ 10:52)    Thyroid Stimulating Hormone, Serum: 1.08 uIU/mL ( @ 15:51)

## 2019-05-14 NOTE — PROGRESS NOTE ADULT - SUBJECTIVE AND OBJECTIVE BOX
Patient is a 68y old  Male who presents with a chief complaint of confusion (08 May 2019 12:11)      HPI:  ** Patient poor historian **    Patient is a 68 year old Non-Hodgkin's Lymphoma tx with chemotherapy in , DM2 with CKD stage 3, HTN, CHF EF 35% on cardiac cath 2016, pleural effusion s/p left pleuracentesis in 10/2016, cardiomyopathy, gout and HLD presents to the ED sent in from cardiologists office because of EKG changes. Patient is not sure why he is here. He was not sure where he was, why he is here or what even the year is. He remarks that he retired a few months ago and he stopped taking his medications then because he "wasn't feeling good". He states he was wife his wife earlier but then later he states shes in [place]. Patient currently denies chest pain, shortness of breath, palpitations, syncope, fevers, chills, recent travel or sick contacts. No new meds however he has stopped taking most of his meds. He overall, feels fine and is not sure why he is in the hospital.     It's very difficult to get a clear history from patient. I have tried to reach his daughter but have not received a call back as of yet. I have called [facility] patients pharmacy and he states patient picked up furosemide and irbersartan in April but has not picked up any other meds in months. Mentation in  AAOX3.    In the ED, VSS. Labs at baseline, CT head with old stroke, Trops elevated and peaked at 50, now normal, EKG with TWI in lateral leads. (08 May 2019 10:33)    5 as the above record indicates, the pt is here and is a very poor historian. I was asked by Dr Hawkins from oncology and by the pt's internist to see the pt as he did have a past of lymphoma. The oncology office will have to look up records as his history is not readily available. When I came to see the pt he was not able to supply much info about his cancer other than he had lymphoma and did not know the type or who treated him. Dr Hawkins thinks he may have been treated by his previous associate, Dr Sarmiento.     At the time of my visit the pt was alert and oriented but was not able to give specific details about any of his medical issues. His chemistries appeared all unremarkable and he was not febrile and his ct of the head showed nothing of note.  I will see if there are any records on his previous onc history and see if there is any reason to think it may have contributed to his present admit here.  events of last day was noted and pt was intubated and had heart failure. The records were reviewed at [facility] and pt in  had a large cell lymphoma and was treated with R CHOP. In  he went to [facility] and was treated for a recurrence with BR and he has remained in remission. 5/10 still intubated and sedated and labs reviewed and thus far no direct evidence for lymphoma recurrence.  Pt still intubated and sedated. Anemia from icu anemia causes no evidence for recurrent lymphoma.  the pt was extubated on this date and hemoglobin was noted to be 11.0 will look when stable for any evidenced of recurrent lymphoma.  notes reviewed and pt still looked somewhat disoriented will check him for nodes in the am counts ok.  stable but confused ands seen by neuro who feels confusion is from the cardiac problems. Pt to be evaluated for MV repair.  MEDICATIONS  (STANDING):  allopurinol 300 milliGRAM(s) Oral daily  aspirin  chewable 81 milliGRAM(s) Oral daily  carvedilol 12.5 milliGRAM(s) Oral every 12 hours  chlorhexidine 4% Liquid 1 Application(s) Topical <User Schedule>  dextrose 5%. 1000 milliLiter(s) (50 mL/Hr) IV Continuous <Continuous>  dextrose 50% Injectable 12.5 Gram(s) IV Push once  docusate sodium 100 milliGRAM(s) Oral three times a day  folic acid 1 milliGRAM(s) Oral daily  heparin  Injectable 5000 Unit(s) SubCutaneous every 8 hours  hydrALAZINE 100 milliGRAM(s) Oral every 8 hours  insulin lispro (HumaLOG) corrective regimen sliding scale   SubCutaneous three times a day before meals  isosorbide   dinitrate Tablet (ISORDIL) 40 milliGRAM(s) Oral every 8 hours  nitroglycerin  Infusion 16.667 MICROgram(s)/Min (5 mL/Hr) IV Continuous <Continuous>  piperacillin/tazobactam IVPB. 3.375 Gram(s) IV Intermittent every 8 hours  senna 2 Tablet(s) Oral at bedtime  spironolactone 25 milliGRAM(s) Oral daily                         10.9   7.4   )-----------( 177      ( 14 May 2019 06:56 )             33.5     138  |  98  |  16  ----------------------------<  121<H>  3.5   |  28  |  1.42<H>    Ca    8.6      12 May 2019 05:10  Phos  3.6     05-12  Mg     2.2     05-12    TPro  6.5  /  Alb  3.0<L>  /  TBili  1.6<H>  /  DBili  x   /  AST  12  /  ALT  9<L>  /  AlkPhos  108  05-12    Urinalysis Basic - ( 08 May 2019 00:17 )    Color: Yellow / Appearance: Slightly Turbid / S.023 / pH: x  Gluc: x / Ketone: Negative  / Bili: Small / Urobili: 3 mg/dL   Blood: x / Protein: 300 mg/dL / Nitrite: Negative   Leuk Esterase: Negative / RBC: 2 /hpf / WBC 7 /HPF   Sq Epi: x / Non Sq Epi: 1 /hpf / Bacteria: Negative        ROS:  Negative except for:    PAST MEDICAL & SURGICAL HISTORY:  Pleural effusion  Moderate mitral regurgitation  Systolic heart failure  Rhinitis, allergic  Essential hypertension  DM type 2 (diabetes mellitus, type 2)  Non-Hodgkins Lymphoma  Non-Hodgkin lymphoma: h/o axillary dissection      SOCIAL HISTORY:    FAMILY HISTORY:  Family history of non-Hodgkin's lymphoma (Sibling)  Family history of CHF (congestive heart failure)      Allergies    No Known Allergies    Intolerances        PHYSICAL EXAM  General: lying in the bed and was being cared for by the staff and appeared still to be somewhat confused.  HEENT: stable   Neck: supple  CV: rr   Lungs: decreased breath sounds at the bases  Abdomen: soft non-tender non-distended, no hepatosplenomegaly  Ext: no clubbing cyanosis or edema  Skin: no rashes and no petechiae  Neuro: alert and oriented X3 no focal deficits    BLOOD SMEAR INTERPRETATION:    RADIOLOGY :

## 2019-05-14 NOTE — PROGRESS NOTE ADULT - SUBJECTIVE AND OBJECTIVE BOX
Patient is a 69y old  Male who presents with a chief complaint of confusion (14 May 2019 12:54)      INTERVAL HPI/OVERNIGHT EVENTS: noted, remains confused, afebrile      Vital Signs Last 24 Hrs  T(C): 36.3 (14 May 2019 14:30), Max: 36.9 (14 May 2019 03:04)  T(F): 97.3 (14 May 2019 14:30), Max: 98.5 (14 May 2019 03:04)  HR: 58 (14 May 2019 14:30) (58 - 60)  BP: 105/55 (14 May 2019 14:30) (75/40 - 144/68)  BP(mean): 68 (14 May 2019 02:00) (49 - 96)  RR: 18 (14 May 2019 14:30) (14 - 28)  SpO2: 98% (14 May 2019 14:30) (94% - 100%)    ALBUTerol/ipratropium for Nebulization 3 milliLiter(s) Nebulizer every 6 hours PRN  allopurinol 300 milliGRAM(s) Oral daily  aspirin  chewable 81 milliGRAM(s) Oral daily  carvedilol 12.5 milliGRAM(s) Oral every 12 hours  dextrose 40% Gel 15 Gram(s) Oral once PRN  dextrose 5%. 1000 milliLiter(s) IV Continuous <Continuous>  dextrose 50% Injectable 12.5 Gram(s) IV Push once  diVALproex  milliGRAM(s) Oral two times a day  docusate sodium 100 milliGRAM(s) Oral three times a day  folic acid 1 milliGRAM(s) Oral daily  furosemide   Injectable 40 milliGRAM(s) IV Push every 12 hours  glucagon  Injectable 1 milliGRAM(s) IntraMuscular once PRN  heparin  Injectable 5000 Unit(s) SubCutaneous every 8 hours  hydrALAZINE 100 milliGRAM(s) Oral every 8 hours  insulin lispro (HumaLOG) corrective regimen sliding scale   SubCutaneous three times a day before meals  isosorbide   dinitrate Tablet (ISORDIL) 40 milliGRAM(s) Oral every 8 hours  melatonin 3 milliGRAM(s) Oral at bedtime  piperacillin/tazobactam IVPB. 3.375 Gram(s) IV Intermittent every 8 hours  senna 2 Tablet(s) Oral at bedtime  sodium chloride 0.9% lock flush 10 milliLiter(s) IV Push every 1 hour PRN  spironolactone 25 milliGRAM(s) Oral daily  valproate sodium IVPB 125 milliGRAM(s) IV Intermittent every 8 hours PRN      PHYSICAL EXAM:  GENERAL: NAD,   EYES: conjunctiva and sclera clear  ENMT: Moist mucous membranes  NECK: Supple, No JVD, Normal thyroid  NERVOUS SYSTEM:  Alert & Oriented X0   CHEST/LUNG: Clear to auscultation bilaterally; No rales, rhonchi, wheezing, or rubs  HEART: Regular rate and rhythm; No murmurs, rubs, or gallops  ABDOMEN: Soft, Nontender, Nondistended; Bowel sounds present  EXTREMITIES:  2+ edema  LYMPH: No lymphadenopathy noted  SKIN: No rashes or lesions    Consultant(s) Notes Reviewed:  [x ] YES  [ ] NO  Care Discussed with Consultants/Other Providers [ x] YES  [ ] NO    LABS:                        10.9   7.4   )-----------( 177      ( 14 May 2019 06:56 )             33.5     05-14    143  |  99  |  18  ----------------------------<  120<H>  3.3<L>   |  29  |  1.57<H>    Ca    8.8      14 May 2019 06:56  Phos  3.5     05-14  Mg     2.0     05-14    TPro  6.6  /  Alb  3.2<L>  /  TBili  2.3<H>  /  DBili  x   /  AST  17  /  ALT  10  /  AlkPhos  103  05-14        CAPILLARY BLOOD GLUCOSE      POCT Blood Glucose.: 123 mg/dL (14 May 2019 12:51)  POCT Blood Glucose.: 134 mg/dL (14 May 2019 08:47)  POCT Blood Glucose.: 146 mg/dL (13 May 2019 17:05)            RADIOLOGY & ADDITIONAL TESTS:    Imaging Personally Reviewed:  [x ] YES  [ ] NO

## 2019-05-14 NOTE — CONSULT NOTE ADULT - PROBLEM SELECTOR RECOMMENDATION 9
Asked to evaluate the patient for possible Transcatheter approach to Mitral Valve repair. Work up is complete, and I will upload KUSHAL to [facility]. We would request that the Heart Failure Service also see patient, as is our usual process for patients undergoing MitraClip Evaluation.
Pt will need medical optimization:  Will need cardiac cath and KUSHAL   Neuro work up to be completed  Complete infectious work up-Blood cx pending  All films are being reviewed by Dr. Teresa  D/w Dr. Teresa
-c/w Hydralazine 100 tid and Isordil 40 tid  -c/w current dose of Lasix 40 IV bid  -f/u Structural recs however prior to proceding with MitraClip would clarify etiology of encephalopathy
- hold AV karen blocking agent  - f/u echo, recommend cardiac MRI to r/o other cause of Non ischemic CMP  - CRT-P vs CRT-D implant after neuro work-up/ echo/MRI  -Will transfer pt to CCU for close observation with external pacer stand by in view of alternating bundle branch block

## 2019-05-14 NOTE — PROVIDER CONTACT NOTE (MEDICATION) - ASSESSMENT
Pt. A&Ox1-2, restless, forgetful, and trying to get out of bed. Pt. denies pain, CP, SOB. VS temp 98.5, HR 60, /68, pulseox 96%. Pt. refusing morning medications, stating "I don't need that".

## 2019-05-14 NOTE — PROGRESS NOTE ADULT - SUBJECTIVE AND OBJECTIVE BOX
Subjective: Patient seen and examined. No new events except as noted.   patient very confused and on one-to-one  Denies shortness of breath or chest pain  MEDICATIONS:  MEDICATIONS  (STANDING):  allopurinol 300 milliGRAM(s) Oral daily  aspirin  chewable 81 milliGRAM(s) Oral daily  carvedilol 12.5 milliGRAM(s) Oral every 12 hours  dextrose 5%. 1000 milliLiter(s) (50 mL/Hr) IV Continuous <Continuous>  dextrose 50% Injectable 12.5 Gram(s) IV Push once  diVALproex  milliGRAM(s) Oral two times a day  docusate sodium 100 milliGRAM(s) Oral three times a day  folic acid 1 milliGRAM(s) Oral daily  furosemide   Injectable 40 milliGRAM(s) IV Push every 12 hours  heparin  Injectable 5000 Unit(s) SubCutaneous every 8 hours  hydrALAZINE 100 milliGRAM(s) Oral every 8 hours  insulin lispro (HumaLOG) corrective regimen sliding scale   SubCutaneous three times a day before meals  isosorbide   dinitrate Tablet (ISORDIL) 40 milliGRAM(s) Oral every 8 hours  melatonin 3 milliGRAM(s) Oral at bedtime  senna 2 Tablet(s) Oral at bedtime  spironolactone 25 milliGRAM(s) Oral daily      PHYSICAL EXAM:  T(C): 36.3 (05-14-19 @ 14:30), Max: 36.9 (05-14-19 @ 03:04)  HR: 60 (05-14-19 @ 17:04) (58 - 60)  BP: 95/50 (05-14-19 @ 17:04) (75/40 - 144/68)  RR: 18 (05-14-19 @ 17:04) (14 - 22)  SpO2: 97% (05-14-19 @ 17:04) (95% - 100%)  Wt(kg): --  I&O's Summary    13 May 2019 07:01  -  14 May 2019 07:00  --------------------------------------------------------  IN: 1074.5 mL / OUT: 1310 mL / NET: -235.5 mL    14 May 2019 07:01  -  14 May 2019 21:38  --------------------------------------------------------  IN: 580 mL / OUT: 1250 mL / NET: -670 mL          Appearance: Normal , awake alert confused, knows name and date of birth	  HEENT:   Normal oral mucosa, PERRL, EOMI	  Cardiovascular: Normal S1 S2, No JVD, 2/6 murmur at the apex rate into the axilla increased P2 ,  Respiratory: Lungs clear to auscultation, normal effort 	  Gastrointestinal:  Soft, Non-tender, + BS	  Skin: No rashes, No ecchymoses, No cyanosis, warm to touch  Musculoskeletal: Normal range of motion, normal strength  Psychiatry:  Mood & affect appropriate  Ext: No edema  Peripheral pulses palpable 2+ bilaterally      LABS:    CARDIAC MARKERS:                                10.9   7.4   )-----------( 177      ( 14 May 2019 06:56 )             33.5     05-14    143  |  99  |  18  ----------------------------<  120<H>  3.3<L>   |  29  |  1.57<H>    Ca    8.8      14 May 2019 06:56  Phos  3.5     05-14  Mg     2.0     05-14    TPro  6.6  /  Alb  3.2<L>  /  TBili  2.3<H>  /  DBili  x   /  AST  17  /  ALT  10  /  AlkPhos  103  05-14    proBNP:   Lipid Profile:   HgA1c:   TSH:           TELEMETRY: 	    ECG:  	  RADIOLOGY:   DIAGNOSTIC TESTING:  [ ] Echocardiogram:  [ ]  Catheterization:  [ ] Stress Test:    OTHER:

## 2019-05-14 NOTE — CONSULT NOTE ADULT - PROBLEM SELECTOR RECOMMENDATION 2
Continue to Diurese as needed, monitor daily weights, strict I and O's. Would recommend Heart Failure Service see patient.
-Recurrence of malignancy w/ CNS involvement? - does this patient need an LP?  -Unless the patient was hypotensive for a prolonged period of time low clinical suspicion that current MS is a result of cardiac pathology  -Neurology and Heme/Onc following
- f/u echo  - optimize GDMT

## 2019-05-14 NOTE — PROGRESS NOTE ADULT - ASSESSMENT
1. Confusion delirium etiology unclear  2. Severe pulmonary hypertension  3 Mitral regurgitation  4. Moderate LV dysfunction  5. Complete heart block status post pacemaker    ecommendations  Continue present regimen of medications but may need to cut back on hydralazine if blood pressure remains low  Structural heart disease to evaluatepatient but mental s issues need to be resolved

## 2019-05-14 NOTE — CONSULT NOTE ADULT - PROBLEM SELECTOR RECOMMENDATION 3
-c/w current doses Isordil and Hydralazine  -c/w Coreg 12.5 bid  -c/w Aldactone 25  -c/w Lasix 40 IV bid    HF will continue to follow    SUKHDEV Nascimento  Cardiology Fellow  [phone]

## 2019-05-14 NOTE — PROGRESS NOTE ADULT - ASSESSMENT
5/8 as the above record indicates, the pt is here and is a very poor historian. I was asked by Dr Hawkins from oncology and by the pt's internist to see the pt as he did have a past of lymphoma. The oncology office will have to look up records as his history is not readily available. When I came to see the pt he was not able to supply much info about his cancer other than he had lymphoma and did not know the type or who treated him. Dr Hawkins thinks he may have been treated by his previous associate, Dr Sarmiento.     At the time of my visit the pt was alert and oriented but was not able to give specific details about any of his medical issues. His chemistries appeared all unremarkable and he was not febrile and his ct of the head showed nothing of note.  I will see if there are any records on his previous onc history and see if there is any reason to think it may have contributed to his present admit here.     5/9 events of last day was noted and pt was intubated and had heart failure. 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 In 2010 he went to [facility] and was treated for a recurrence with BR and he has remained in remission.   5/10 still intubated and sedated and labs reviewed and thus far no direct evidence for lymphoma recurrence.  . 5/11 Pt still intubated and sedated. Anemia from icu anemia causes no evidence for recurrent lymphoma.   5/12 the pt was extubated on this date and hemoglobin was noted to be 11.0 will look when stable for any evidenced of recurrent lymphoma.   5/13 notes reviewed and pt still looked somewhat disoriented will check him for nodes in the am counts ok.  . 5/14 stable but confused ands seen by neuro who feels confusion is from the cardiac problems. Pt to be evaluated for MV repair.

## 2019-05-14 NOTE — CONSULT NOTE ADULT - CONSULT REQUESTED BY NAME
CCU / Structural Heart Team
Dr. Loyd
Dr. MAIRA Buckley
Dr. Medrano
primary team
stephen elliott
Medicine
dr Proctor

## 2019-05-14 NOTE — CONSULT NOTE ADULT - PROVIDER SPECIALTY LIST ADULT
CT Surgery
Cardiology
Electrophysiology
Heart Failure
Infectious Disease
Neurology
Structural Heart
Heme/Onc

## 2019-05-14 NOTE — CONSULT NOTE ADULT - PROBLEM SELECTOR PROBLEM 1
Moderate mitral regurgitation
Severe mitral regurgitation
Severe mitral regurgitation
Complete heart block

## 2019-05-14 NOTE — CONSULT NOTE ADULT - ASSESSMENT
Mr. Skaggs is a 69 year old man with prior NHL (treated with R-CHOP 2004 and BR in 2010), chronic HFrEF (likely 2/2 Doxorubicin, EF:45%, decreased RVSF) who was sent to the ED on 5/8 with AMS and heart block - he progressed to complete heart block with encephalopathy requiring intubation and a leadless pacemaker.  His hospital course has included a KUSHAL revealing severe MR 2/2 a dilated MV annulus, severe pHTN (likely post-capillary 2/2 MR). HF asked to follow during the work up for a MitraClip. o w/ PASP:72. He underwent a R + LHC - results below and implantation of a leadless pacemaker. HF called to follow during w/u for MitraClip. He remains hypervolemic and is tolerating afterload reducing agents.

## 2019-05-14 NOTE — CONSULT NOTE ADULT - ASSESSMENT
69M w/ PMHx  NHL s/p R-CHOP 2004, CHF/CMP (chemo related?) presenting with confusion and complete heart block. Now s/p extubation, placement of pacemaker, and now being considered for MV repair(MitraClip vs MVR). Confusion per daughter at bedside but appears to have delirium (she states was more oriented earlier).  Loculated pleural fluid on R is old, seen on CT's back in 2016, no concern of pneumonia  Modest elevation in bilirubin- hepatic congestion on cardiac basis. No abdominal discomfort or tenderness on exam. Skeptical of any cholecystitis or cholangitis (though at risk for "acalculous" cholecystitis.   No concern of SSTI  Vance U/A, no urine cx. Doubt UTI.  Doubt any primary CNS infection, seems like delirium not encephalitis. No mass lesions on MR to suggest CNS lymphoma  I do not see any clear/convincing evidence to continue antibiotics at this time.    Suggestions--  Stop Zosyn.    Thank you for the courtesy of this referral.    Jd Proctor MD  [phone]

## 2019-05-14 NOTE — CONSULT NOTE ADULT - CONSULT REASON
Altered mental status
AMS, abnormal EKG with RBBB
MR
MitraClip
Severe MR
change in mental status  arrythmia conduction disease
use of antibiotics
history of lymphoma at least 10 years or more and now with altered mental state

## 2019-05-14 NOTE — CHART NOTE - NSCHARTNOTEFT_GEN_A_CORE
CCU Transfer Note    Transfer from: CCU    Transfer to: (  ) Medicine    (  ) Telemetry    (  ) RCU                               (  ) Palliative    (  ) Stroke Unit    (  ) MICU    (x ) ___[id number]___________    Accepting Physician:    Signout given to:     HPI / CCU COURSE:    68 year old remote hx Non-Hodgkin's Lymphoma tx with chemotherapy in 2004, DM2 with CKD stage 3, HTN, CHF EF 35% on cardiac cath 12/ 2016, pleural effusion s/p left pleuracentesis in 10/2016, cardiomyopathy, gout and HLD presented to the ED sent in from cardiologists office because of EKG changes and AMS. Patient noted to be confused AOx1. Unclear hx of AMS with several weeks of AMS noted by PCP visit and worsened mental status to cardiologist visit. In ED troponin noted to mildly elevated in 50s. Admitted for encephalopathy of unclear etiology.      On the floor EKG abnormal found to have complete heart block with alternating RBBB with LBBB with HR 50's bpm. Previous cardiac workup, echo (Nov 2016) EF 44%, cardiac cath (Dec 2016) non obstructive CAD. Pt transferred to CCU2 for close observation with external pacer stand by in view of alternating bundle branch block.    In CCU2 pt A&O x1, initially pleasant and asymptomatic CHB. Shortly after arrival pt became agitated, sitting at edge of the bed c/o nausea and wanting to go home because he had to vomit. Pt began pulling his leads, pacer pads and all monitoring devices off. Ativan given and CIWA protocol initiated for possible withdrawal. Patient noted to desaturate intermittently to 70s-80s with subsequent intubation on 5/9 for worsening mental status and airway protection, transferred to CCU1 for transvenous pacing.    In CCU1 patient intially paced via TVP. Patient received ischemic workup with right and left heart cath showing pulmonary HTN, severe MR, and no significant coronary artery stenosis requiring intervention. Patient had micra placed 5/10 with TVP removed. Neurological workup grossly unremarkable with CT head showing chronic left parietal infarct and MR with gliosis. Patient extubated on 5/11 with mental status improved AOx2. Patient medically optimized for heart failure with diuresis, afterload reduction with nitro gtt transitioned off to hydralazine, isordil, lasix. Patient remained mildly agitated and confused placed on 1:1 with behavioral health consulted with recommendations to start depakote. CT surgery additionally consulted for possible MV repair, deferred at this time due to encephalopathy.           Vital Signs Last 24 Hrs  T(C): 36.8 (13 May 2019 23:00), Max: 37.1 (13 May 2019 05:30)  T(F): 98.2 (13 May 2019 23:00), Max: 98.8 (13 May 2019 05:30)  HR: 60 (14 May 2019 01:00) (58 - 68)  BP: 108/50 (14 May 2019 01:00) (75/40 - 184/70)  BP(mean): 68 (14 May 2019 01:00) (49 - 130)  RR: 22 (14 May 2019 01:00) (12 - 39)  SpO2: 98% (14 May 2019 01:00) (89% - 100%)    I&O's Summary    12 May 2019 07:01  -  13 May 2019 07:00  --------------------------------------------------------  IN: 1742.5 mL / OUT: 1325 mL / NET: 417.5 mL    13 May 2019 07:01  -  14 May 2019 01:25  --------------------------------------------------------  IN: 904.5 mL / OUT: 1260 mL / NET: -355.5 mL        Physical Exam:       LABS:                               11.1   10.9  )-----------( 183      ( 13 May 2019 02:11 )             33.4       05-13    140  |  100  |  17  ----------------------------<  168<H>  4.1   |  24  |  1.37<H>    Ca    8.8      13 May 2019 02:12  Phos  2.9     05-13  Mg     2.0     05-13    TPro  6.7  /  Alb  3.1<L>  /  TBili  1.9<H>  /  DBili  x   /  AST  16  /  ALT  9<L>  /  AlkPhos  113  05-13      FOR FOLLOW UP:  [ ] CT surgery recs for possible MV repair CCU Transfer Note    Transfer from: CCU    Transfer to: (  ) Medicine    (  ) Telemetry    (  ) RCU                               (  ) Palliative    (  ) Stroke Unit    (  ) MICU    (x ) ___[id number]___________    Accepting Physician: Ina    Signout given to: ROSENDO Buckley NP    HPI / CCU COURSE:    68 year old remote hx Non-Hodgkin's Lymphoma tx with chemotherapy in 2004, DM2 with CKD stage 3, HTN, CHF EF 35% on cardiac cath 12/ 2016, pleural effusion s/p left pleuracentesis in 10/2016, cardiomyopathy, gout and HLD presented to the ED sent in from cardiologists office because of EKG changes and AMS. Patient noted to be confused AOx1. Unclear hx of AMS with several weeks of AMS noted by PCP visit and worsened mental status to cardiologist visit. In ED troponin noted to mildly elevated in 50s. Admitted for encephalopathy of unclear etiology.      On the floor EKG abnormal found to have complete heart block with alternating RBBB with LBBB with HR 50's bpm. Previous cardiac workup, echo (Nov 2016) EF 44%, cardiac cath (Dec 2016) non obstructive CAD. Pt transferred to CCU2 for close observation with external pacer stand by in view of alternating bundle branch block.    In CCU2 pt A&O x1, initially pleasant and asymptomatic CHB. Shortly after arrival pt became agitated, sitting at edge of the bed c/o nausea and wanting to go home because he had to vomit. Pt began pulling his leads, pacer pads and all monitoring devices off. Ativan given and CIWA protocol initiated for possible withdrawal. Patient noted to desaturate intermittently to 70s-80s with subsequent intubation on 5/9 for worsening mental status and airway protection, transferred to CCU1 for transvenous pacing.    In CCU1 patient intially paced via TVP. Patient received ischemic workup with right and left heart cath showing pulmonary HTN, severe MR, and no significant coronary artery stenosis requiring intervention. Patient had micra placed 5/10 with TVP removed. Neurological workup grossly unremarkable with CT head showing chronic left parietal infarct and MR with gliosis. Patient extubated on 5/11 with mental status improved AOx2. Patient medically optimized for heart failure with diuresis, afterload reduction with nitro gtt transitioned off to hydralazine, isordil, lasix. Patient remained mildly agitated and confused placed on 1:1 with behavioral health consulted with recommendations to start depakote. CT surgery additionally consulted for possible MV repair, deferred at this time due to encephalopathy.           Vital Signs Last 24 Hrs  T(C): 36.8 (13 May 2019 23:00), Max: 37.1 (13 May 2019 05:30)  T(F): 98.2 (13 May 2019 23:00), Max: 98.8 (13 May 2019 05:30)  HR: 60 (14 May 2019 01:00) (58 - 68)  BP: 108/50 (14 May 2019 01:00) (75/40 - 184/70)  BP(mean): 68 (14 May 2019 01:00) (49 - 130)  RR: 22 (14 May 2019 01:00) (12 - 39)  SpO2: 98% (14 May 2019 01:00) (89% - 100%)    I&O's Summary    12 May 2019 07:01  -  13 May 2019 07:00  --------------------------------------------------------  IN: 1742.5 mL / OUT: 1325 mL / NET: 417.5 mL    13 May 2019 07:01  -  14 May 2019 01:25  --------------------------------------------------------  IN: 904.5 mL / OUT: 1260 mL / NET: -355.5 mL      LABS:                               11.1   10.9  )-----------( 183      ( 13 May 2019 02:11 )             33.4       05-13    140  |  100  |  17  ----------------------------<  168<H>  4.1   |  24  |  1.37<H>    Ca    8.8      13 May 2019 02:12  Phos  2.9     05-13  Mg     2.0     05-13    TPro  6.7  /  Alb  3.1<L>  /  TBili  1.9<H>  /  DBili  x   /  AST  16  /  ALT  9<L>  /  AlkPhos  113  05-13      FOR FOLLOW UP:  [ ] CT surgery recs for possible MV repair

## 2019-05-14 NOTE — PROGRESS NOTE ADULT - SUBJECTIVE AND OBJECTIVE BOX
Neurology Follow up note    Patient is a 69y old  Male who presents with a chief complaint of Confusion (14 May 2019 07:09)  Subjective:Interval History - extubated, transferred from CCU to [facility], awaiting CT surg for possible MV repair    Objective:   Vital Signs Last 24 Hrs  T(C): 36.6 (14 May 2019 11:53), Max: 36.9 (14 May 2019 03:04)  T(F): 97.9 (14 May 2019 11:53), Max: 98.5 (14 May 2019 03:04)  HR: 60 (14 May 2019 11:53) (58 - 60)  BP: 102/56 (14 May 2019 11:53) (75/40 - 144/68)  BP(mean): 68 (14 May 2019 02:00) (49 - 96)  RR: 18 (14 May 2019 11:53) (14 - 28)  SpO2: 97% (14 May 2019 11:53) (94% - 100%)    General Exam:   General appearance: No acute distress                   Neurological Exam:  Mental Status: Orientated to self and place but not date (3 guesses to get May, thought it was 1984 and that Denys was president).  Attention intact.  No dysarthria, aphasia or neglect. Able to follow simple and complex commands.      Cranial Nerves:  PERRL, EOMI, VFF, no nystagmus or diplopia. No facial asymmetry.    Motor:   Tone: normal.                  Strength: intact throughout  Pronator drift: none                 Dysmeria: None to finger-nose-finger  No truncal ataxia.    Tremor: No resting, postural or action tremor.  No myoclonus.  Sensation: intact to light touch  Deep Tendon Reflexes: 1+ bilateral biceps, triceps, brachioradialis, knee and ankle  Toes flexor bilaterally  Gait: normal and stable.      Other:  05-14    143  |  99  |  18  ----------------------------<  120<H>  3.3<L>   |  29  |  1.57<H>    Ca    8.8      14 May 2019 06:56  Phos  3.5     05-14  Mg     2.0     05-14    TPro  6.6  /  Alb  3.2<L>  /  TBili  2.3<H>  /  DBili  x   /  AST  17  /  ALT  10  /  AlkPhos  103  05-14    LIVER FUNCTIONS - ( 14 May 2019 06:56 )  Alb: 3.2 g/dL / Pro: 6.6 g/dL / ALK PHOS: 103 U/L / ALT: 10 U/L / AST: 17 U/L / GGT: x                               10.9   7.4   )-----------( 177      ( 14 May 2019 06:56 )             33.5   MEDICATIONS  (STANDING):  allopurinol 300 milliGRAM(s) Oral daily  aspirin  chewable 81 milliGRAM(s) Oral daily  carvedilol 12.5 milliGRAM(s) Oral every 12 hours  dextrose 5%. 1000 milliLiter(s) (50 mL/Hr) IV Continuous <Continuous>  dextrose 50% Injectable 12.5 Gram(s) IV Push once  diVALproex  milliGRAM(s) Oral two times a day  docusate sodium 100 milliGRAM(s) Oral three times a day  folic acid 1 milliGRAM(s) Oral daily  furosemide   Injectable 40 milliGRAM(s) IV Push every 12 hours  heparin  Injectable 5000 Unit(s) SubCutaneous every 8 hours  hydrALAZINE 100 milliGRAM(s) Oral every 8 hours  insulin lispro (HumaLOG) corrective regimen sliding scale   SubCutaneous three times a day before meals  isosorbide   dinitrate Tablet (ISORDIL) 40 milliGRAM(s) Oral every 8 hours  melatonin 3 milliGRAM(s) Oral at bedtime  piperacillin/tazobactam IVPB. 3.375 Gram(s) IV Intermittent every 8 hours  senna 2 Tablet(s) Oral at bedtime  spironolactone 25 milliGRAM(s) Oral daily    MEDICATIONS  (PRN):  ALBUTerol/ipratropium for Nebulization 3 milliLiter(s) Nebulizer every 6 hours PRN Bronchospasm  dextrose 40% Gel 15 Gram(s) Oral once PRN Blood Glucose LESS THAN 70 milliGRAM(s)/deciliter  glucagon  Injectable 1 milliGRAM(s) IntraMuscular once PRN Glucose LESS THAN 70 milligrams/deciliter  sodium chloride 0.9% lock flush 10 milliLiter(s) IV Push every 1 hour PRN Pre/post blood products, medications, blood draw, and to maintain line patency  valproate sodium IVPB 125 milliGRAM(s) IV Intermittent every 8 hours PRN agitation

## 2019-05-14 NOTE — CONSULT NOTE ADULT - PROBLEM SELECTOR PROBLEM 2
Acute on chronic systolic CHF (congestive heart failure), NYHA class 3
Acute encephalopathy
Chronic systolic heart failure

## 2019-05-14 NOTE — CONSULT NOTE ADULT - PROBLEM SELECTOR PROBLEM 3
Complete heart block
Acute on chronic systolic CHF (congestive heart failure), NYHA class 3
Altered mental status

## 2019-05-14 NOTE — PROGRESS NOTE ADULT - ASSESSMENT
68 year old Non-Hodgkin's Lymphoma tx with chemotherapy in 2004, DM2 with CKD stage 3, HTN, CHF EF 35% on cardiac cath 12/ 2016, pleural effusion s/p left pleuracentesis in 10/2016, cardiomyopathy, gout and HLD presents to the ED sent in from cardiologists office because of AMS    # Respi failure/apnea/hypoxia-  sp Extubated  CxR-unchanged rt loculated small effusion, mild interstitial edema    # CHB sp micra ppm implant 5/10   CCU mgmt  cards and EP fu    #Acute encephalopathy. ?etiology  r/o acute delirium on vascular dementia  less likely infectious etiology,   -chronic parietal infarct in CT head   MR brain reviewed  , neuro cs fu  vitb12, folate, iron panel, rpr and tsh levels wnl  ?LP when feasible  psych cs noted- depakote    # Fevers- afebrile  UA neg, CXR neg, bld cx ngtd  ?need for LP  cont zosyn  ID cs  -  # Chronic systolic heart failure.    - TTE EF 45%, likely severe MR, moderately enlarged RV , severe pulmonary hypertension. Estimated PASP = 75 mmHg.  - Medication non compliance  - Holding antihypertensives given CHB  - started furosemide w/ I+Os given signs of volume overload.   CT sx cs noted for severe MR eval- fu recs    # Non-Hodgkin lymphoma of lymph nodes of neck, unspecified non-Hodgkin lymphoma type.  Plan: - treated with chemotherapy in 2014.   Anemia-monitor h/h  Onc cs fu noted    # Essential hypertension.  Plan: - bp stable  - c/w arb and beta blocker.     # Type 2 diabetes mellitus with chronic kidney disease, without long-term current use of insulin, unspecified CKD stage. Plan: - fs achs  - fs controlled  - start sliding scale insulin  - h    # Chronic gout without tophus, unspecified cause, unspecified site.   - c/w allopurinol 300 mg daily.

## 2019-05-14 NOTE — CONSULT NOTE ADULT - ATTENDING COMMENTS
patient seen and examined with housestaff on 5/9/19    69 yo RH man with PMHx non Hodgkin's Lymphoma ([facility]ATTA since 2004), also with DM, CKD, CHF, admitted with cognitive decline over past weeks, imaging disclosed old right parietal infarction.    EXAM  intubated and sedated  intact brainstem reflexes    IMAGING  MRI - BRAIN - 5/8/19 - old right parietal infarction, no other suspicious lesions. NO acute CVA.    IMPRESSION/PLAN  67 YO RH man with old Right parietal CVA, now with behavior changes past few weeks, MR shows no acute pathology.    COGNITIVE DECLINE -- curious. likely toxic-metabolic, but no overt etiology. seizure from prior CVA possible, so EEG will be useful.  DISPO -- please call once extubated/off sedation for re-evaluation
Will continue to optimize hemodynamics. It is possible that altered mental status is the result of prolonged hypoperfusion in the setting of his heart block, but we do not have objective evidence of this.     Please call me with questions at [phone].
Pt seen and examined.  Agree with above.  Repeat TTE to determine degree of MR after medical optimization.  Would reassess pt's mental status and candidacy for surgical MVR versus mitraclip

## 2019-05-14 NOTE — CONSULT NOTE ADULT - SUBJECTIVE AND OBJECTIVE BOX
[facility], Division of Infectious Diseases  DEONNA Enriquez A. Lee BETANCOURT, NIK  69y, Male  [id number]    HPI--  69M, not a reliable historian. Thinks he is in [facility]; Year= 1994; month= February; despite that it is in fact his birthday today and he had cake brought in by his daughter.  History primarily from chart.  Per ER note:  · Chief Complaint: The patient is a 68y Male complaining of altered mental status.	  · HPI Objective Statement: 67 yo M PMHx cardiomyopathy, DM II, non-hodgkins lymphoma (2004, relapse 2010), RBBB, biba from pmd's office with ekg changes, confusion. Pt unable to provide coherent timeline of events but states was feeling chest pain and SOB at PMD's office. These sx started around noon today. Denies fevers/chills, abd pain, N/V, dysuria.	    Per H&P:  Admit HPI:  ** Patient poor historian **    Patient is a 68 year old Non-Hodgkin's Lymphoma tx with chemotherapy in 2004, DM2 with CKD stage 3, HTN, CHF EF 35% on cardiac cath 12/ 2016, pleural effusion s/p left pleuracentesis in 10/2016, cardiomyopathy, gout and HLD presents to the ED sent in from cardiologists office because of EKG changes. Patient is not sure why he is here. He was not sure where he was, why he is here or what even the year is. He remarks that he retired a few months ago and he stopped taking his medications then because he "wasn't feeling good". He states he was wife his wife earlier but then later he states shes in [place]. Patient currently denies chest pain, shortness of breath, palpitations, syncope, fevers, chills, recent travel or sick contacts. No new meds however he has stopped taking most of his meds. He overall, feels fine and is not sure why he is in the hospital.     It's very difficult to get a clear history from patient. I have tried to reach his daughter but have not received a call back as of yet. I have called [facility] patients pharmacy and he states patient picked up furosemide and irbersartan in April but has not picked up any other meds in months. Mentation in 2016 AAOX3.    In the ED, VSS. Labs at baseline, CT head with old stroke, Trops elevated and peaked at 50, now normal, EKG with TWI in lateral leads. (08 May 2019 10:33)    Here patient admitted and found to have complete heart block, and elevated cardiac enzymes concerning for AMI. He was evaluated by cardiology and then electrophysiology, and admitted to the CCU. Patient required intubation, transvenous pacing followed by PM placement. Cardiac workup included cardiac catheterization, Lyme testing, TTE/KUSHAL. Patient assessed by neurology and psychiatry, altered mental status thought to be related to his cardiac event.     Patient has also been assessed by CTS, structural heart team, and heart failure team for treatment of mitral regurgitation.    Patient has been on Zosyn since 5/9, indication unclear.    PMH/PSH--  Pleural effusion  Moderate mitral regurgitation  Systolic heart failure  Rhinitis, allergic  Essential hypertension  DM type 2 (diabetes mellitus, type 2)  Diabetes Mellitus  Non-Hodgkins Lymphoma  Non-Hodgkin lymphoma  No significant past surgical history      Allergies-- NKDA      Medications--  Antibiotics: piperacillin/tazobactam IVPB. 3.375 Gram(s) IV Intermittent every 8 hours    Immunologic:   Other: ALBUTerol/ipratropium for Nebulization PRN  allopurinol  aspirin  chewable  carvedilol  dextrose 40% Gel PRN  dextrose 5%.  dextrose 50% Injectable  diVALproex DR  docusate sodium  folic acid  furosemide   Injectable  glucagon  Injectable PRN  heparin  Injectable  hydrALAZINE  insulin lispro (HumaLOG) corrective regimen sliding scale  isosorbide   dinitrate Tablet (ISORDIL)  melatonin  senna  sodium chloride 0.9% lock flush PRN  spironolactone  valproate sodium IVPB PRN      Social History--  EtOH: ocasional  Tobacco: denies   Drug Use: denies     Family/Marital History--  Family history of non-Hodgkin's lymphoma (Sibling)  Family history of CHF (congestive heart failure)    Remainder not relevant to clinical concern.      Review of Systems:  Review of systems unable secondary to clinical condition.     Physical Exam--  Vital Signs: T(F): 97.3 (05-14-19 @ 14:30), Max: 98.5 (05-14-19 @ 03:04)  HR: 60 (05-14-19 @ 17:04)  BP: 95/50 (05-14-19 @ 17:04)  RR: 18 (05-14-19 @ 17:04)  SpO2: 97% (05-14-19 @ 17:04)  Wt(kg): --  General: Nontoxic-appearing Male in no acute distress.  HEENT: AT/NC.  Anicteric. Conjunctiva pink and moist. Oropharynx clear.  Neck: Not rigid. No sense of mass.  Nodes: None palpable.  Lungs: Diminished breath sounds R>L without rales, wheezing or rhonchi  Heart: Regular rate and rhythm. II/VI systolic Murmur. No rub. No gallop. No palpable thrill.  Abdomen: Bowel sounds present and normoactive. Soft. Nondistended. Nontender. No sense of mass. No organomegaly.  Back: No spinal tenderness. No costovertebral angle tenderness.   Extremities: No cyanosis or clubbing. No edema.   Skin: Warm. Dry. Good turgor. No rash. No vasculitic stigmata.  Psychiatric: Pleasantly confused, confabulates a bit.      Laboratory & Imaging Data--  CBC                        10.9   7.4   )-----------( 177      ( 14 May 2019 06:56 )             33.5       Chemistries  05-14    143  |  99  |  18  ----------------------------<  120<H>  3.3<L>   |  29  |  1.57<H>    Ca    8.8      14 May 2019 06:56  Phos  3.5     05-14  Mg     2.0     05-14    TPro  6.6  /  Alb  3.2<L>  /  TBili  2.3<H>  /  DBili  x   /  AST  17  /  ALT  10  /  AlkPhos  103  05-14    Borrelia burgdorferi IgG/IgM Antibodies (05.09.19 @ 10:54)    LYME IgG/IgM Antibodies Result: 0.05 Index    Lyme C6 Interpretation: Negative: METHOD: Liaison Chemiluminescent Immunoassay      Reference Range: (values expressed as Lyme Index )                                < 0.90        Negative                                0.90 - 1.09   Equivocal                                >= 1.10     Positive  CDC/ASTPHLD Guidelines recommend that all samples judged equivocal or  positive be re-tested by immunoblot for confirmation of results.        Urinalysis (05.10.19 @ 00:35)    Glucose Qualitative, Urine: Negative    Blood, Urine: Moderate    pH Urine: 6.0    Color: Light Yellow    Urine Appearance: Clear    Bilirubin: Negative    Ketone - Urine: Negative    Specific Gravity: 1.012: RESULT VERIFIED BY REFRACTOMETER    Protein, Urine: 30 mg/dL    Urobilinogen: Negative    Nitrite: Negative    Leukocyte Esterase Concentration: Small  Urine Microscopic-Add On (NC) (05.10.19 @ 00:35)    Epithelial Cells: 1    White Blood Cell - Urine: 1 /HPF    Red Blood Cell - Urine: 1 /hpf      Culture Data    Culture - Blood (collected 10 May 2019 02:10)  Source: .Blood  Preliminary Report (11 May 2019 03:01):    No growth to date.    Culture - Blood (collected 10 May 2019 02:10)  Source: .Blood  Preliminary Report (11 May 2019 03:01):    No growth to date.    Culture - Blood (collected 09 May 2019 09:23)  Source: .Blood  Final Report (14 May 2019 10:00):    No growth at 5 days.    Culture - Blood (collected 09 May 2019 09:23)  Source: .Blood  Preliminary Report (10 May 2019 10:01):    No growth to date.    CXR (personally reviewed)   < from: Xray Chest 1 View AP/PA (05.12.19 @ 09:40) >    INTERPRETATION:  Indication: Abnormal chest sounds.    Technique: Single AP view of the chest.    Comparison: 5/11/2019    Findings: The heart size cannotbe adequately assessed on this single   view. The left IJ central venous catheter with its tip overlying the   superior vena cava. There is loculated fluid in the right fissure which   is not significantly changed. There is mild pulmonary edema. There is a   probable small left pleural effusion.    Impression: Unchanged loculated fluid in the right fissure. Mild   pulmonary edema. Small left pleural effusion.    < end of copied text >        < from: CT Head No Cont (05.07.19 @ 21:50) >  IMPRESSION:   No evidence of acute intracranial hemorrhage, midline shift, or   hydrocephalus.    Chronic right parietal lobe infarct.    < end of copied text >      < from: MR Head No Cont (05.08.19 @ 19:19) >  IMPRESSION: Evidence of an old cortically based infarct in the right   lateral temporal parietal region with encephalomalacia and gliosis as   well as some trace old blood products.. No evidence of acute pathology.   Age-appropriate involutional changes.      < end of copied text >    < from: US Abdomen Complete (05.09.19 @ 14:29) >    EXAM:  US ABDOMEN COMPLETE                            PROCEDURE DATE:  05/09/2019            INTERPRETATION:  CLINICAL INFORMATION: Encephalopathy with concern for   cirrhosis.    COMPARISON: Correlation is made with prior chest CT dated 10/27/2016.    TECHNIQUE: Portable Sonography of the abdomen.     FINDINGS:    Liver cyst: Enlarged, measuring 17.2 cm in length.    Bile ducts: Normal caliber. Common bile duct measures 3 mm.     Gallbladder: Contains sludge. Thickened wall, measuring 5 mm. Sonographic   Montaño sign could not be assessed.    Pancreas: Limited evaluation secondary to overlying bowel gas.    Spleen: Enlarged, measuring 17 cm.    Right kidney: 10.8 cm. No hydronephrosis.     Left kidney: 11.8 cm.  No hydronephrosis.     Ascites: Small volume ascites.    Aorta and IVC: Visualized portions are within normal limits.    Miscellaneous: Moderate left pleural effusion.    IMPRESSION:     Hepatosplenomegaly.    Small volume ascites.    Moderate left pleural effusion.    Sludge inthe gallbladder. Thickened gallbladder wall, which may be   secondary to the presence of adjacent ascites.    JOSEFA VALENTIN M.D., ATTENDING RADIOLOGIST  This document has been electronically signed. May  9 2019  3:31PM      < end of copied text >    < from: Transesophageal Echocardiogram w/o TTE (05.10.19 @ 10:32) >    Patient name: NIK GRIMM  YOB: 1950   Age: 68 (M)   MR#: [id number]  Study Date: 5/10/2019  Location: [place] [id number]: Deborah Colin M.D.  Study quality: Technically good  Referring Physician: Bruna Medrano MD  BloodPressure: 125/47 mmHg  Height: 188 cm  Weight: 97 kg  BSA: 2.2 m2  ------------------------------------------------------------------------  PROCEDURE: Transesophageal (KUSHAL) was performed.  Informed  consent was first obtained for KUSHAL. The patient was sedated  - see anesthesia record.  The procedure was monitored with  automatic blood pressure monitoring, ECG tracings and pulse  oximetry. The transesophageal probe was placed in the  esophagus posterior to the heart without complications.  INDICATION: Nonrheumatic mitral (valve) insufficiency  (I34.0)  ------------------------------------------------------------------------  Observations:  Mitral Valve: Tethered mitral valve leaflets with normal  opening.  Dilated mitral annlus. Severe mitral  regurgitation.  MR ERO 0.65 cm sq  MR volume 79 ml.  Aortic Valve/Aorta: Trileaflet aortic valve.  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 Peak left  ventricular outflow tract gradient equals 3 mm Hg, mean  gradient is equal to 1 mm Hg, LVOT velocity time integral  equals 13 cm.  LVOT diameter: 2.2 cm.  Complex atheroma noted in aortic arch/descending aorta.  Left Atrium: Normal left atrium.  Left Ventricle: Mild to moderate  left ventricular systolic  dysfunction. Normal left ventricular internal dimensions  and wall thicknesses.  Right Heart: Severe right atrial enlargement.   A device  wire is noted in the right heart. Right ventricular  enlargement with decreased right ventricular systolic  function. Normal tricuspid valve. Moderate tricuspid  regurgitation. Normal pulmonic valve.  Pericardium/Pleura: Normal pericardium with no pericardial  effusion.  Left pleural effusion.  Hemodynamic: Estimated right atrial pressure is 8 mm Hg.  Estimated right ventricular systolic pressure [id number] mm  Hg, assuming right atrial pressure equals 8 mm Hg,  consistent with severe pulmonary hypertension. Color  Doppler demonstrates no evidence of a patent foramen ovale.  ------------------------------------------------------------------------  Conclusions:  1. Tethered mitral valve leaflets with normal opening.  Dilated mitral annlus. Severe mitral regurgitation.  MR ERO 0.65 cm sq  MR volume 79 ml.  2. Severe left atrial enlargement.    No left atrial or  left atrial appendage thrombus.  Decreased left atrial  appendage velocities noted.  3. Mild to moderate  left ventricular systolic dysfunction.  4. Severe right atrial enlargement.   A device wire is  noted in the right heart.  5. Right ventricular enlargement with decreased right  ventricular systolic function.  6. Normal tricuspid valve. Moderate tricuspid  regurgitation.  7. Estimated pulmonary artery systolic pressure equals 72  mm Hg, assuming right atrial pressure equals 8 mm Hg,  consistent with severe pulmonary pressures.  8. Left pleural effusion.  ------------------------------------------------------------------------  Confirmed on  5/10/2019 - 18:01:32 by Lionel New M.D.  ------------------------------------------------------------------------    < end of copied text >

## 2019-05-14 NOTE — PROGRESS NOTE ADULT - ASSESSMENT
68 year old male with PMHx of Non-Hodgkin's Lymphoma tx w/ chemo in 2004, DM2 w/ CKD stage 3, HTN, CHF EF 35% on cardiac cath 12/ 2016, pleural effusion s/p left pleuracentesis in 10/2016, cardiomyopathy, gout and HLD found to be in complete heart block with  severe MR.  On RHC/LHC shown to have elevated filling pressures and CAD however nonobstructive. On 5/10 pt underwent Micra PPM w/ VVI 60. On 5/11 pt was extubated, pending Ct surgery evaluation for severe MR     Plan  [] MRI brain unremarkable for acute strokes  [] dementia labs unremarkable  [] rEEG: no seizures  [] etiology of AMS likely cardiac in nature, no further workup necessary from neurologic standpoint 68 year old male with PMHx of Non-Hodgkin's Lymphoma tx w/ chemo in 2004, DM2 w/ CKD stage 3, HTN, CHF EF 35% on cardiac cath 12/ 2016, pleural effusion s/p left pleuracentesis in 10/2016, cardiomyopathy, gout and HLD found to be in complete heart block with  severe MR.  On RHC/LHC shown to have elevated filling pressures and CAD however nonobstructive. On 5/10 pt underwent Micra PPM w/ VVI 60. On 5/11 pt was extubated, pending Ct surgery evaluation for severe MR     Plan  [] MRI brain unremarkable for acute strokes  [] repeat CThead to assess for acute neurologic changes while in CCU  [] dementia labs unremarkable  [] rEEG: no seizures

## 2019-05-14 NOTE — PROGRESS NOTE ADULT - ATTENDING COMMENTS
68 year old male with PMHx of Non-Hodgkin's Lymphoma tx w/ chemo in 2004, DM2 w/ CKD stage 3, HTN, CHF EF 35% on cardiac cath 12/ 2016, pleural effusion s/p left pleuracentesis in 10/2016, cardiomyopathy, gout and HLD found to be in complete heart block with  severe MR.  On RHC/LHC shown to have elevated filling pressures and CAD however nonobstructive. On 5/10 pt underwent Micra PPM w/ VVI 60. On 5/11 pt was extubated, pending Ct surgery evaluation for severe MR     Assessment:  Altered Mental Status r/o secondary to anoxic injury versus metabolic syndrome.  [] MRI brain unremarkable for acute strokes  [] repeat CThead to assess for acute neurologic changes while in CCU  [] dementia labs unremarkable  [] rEEG: no seizures  consider for longer term neuropsych testing for areas of fine deficit.

## 2019-05-14 NOTE — CONSULT NOTE ADULT - CONSULT REQUESTED DATE/TIME
08-May-2019 12:11
08-May-2019 17:46
09-May-2019
13-May-2019 15:42
14-May-2019 07:09
14-May-2019 17:05
08-May-2019
08-May-2019 15:52

## 2019-05-15 LAB
ANION GAP SERPL CALC-SCNC: 14 MMOL/L — SIGNIFICANT CHANGE UP (ref 5–17)
BUN SERPL-MCNC: 23 MG/DL — SIGNIFICANT CHANGE UP (ref 7–23)
CALCIUM SERPL-MCNC: 8.6 MG/DL — SIGNIFICANT CHANGE UP (ref 8.4–10.5)
CHLORIDE SERPL-SCNC: 100 MMOL/L — SIGNIFICANT CHANGE UP (ref 96–108)
CO2 SERPL-SCNC: 30 MMOL/L — SIGNIFICANT CHANGE UP (ref 22–31)
CREAT SERPL-MCNC: 1.62 MG/DL — HIGH (ref 0.5–1.3)
CULTURE RESULTS: SIGNIFICANT CHANGE UP
GLUCOSE BLDC GLUCOMTR-MCNC: 106 MG/DL — HIGH (ref 70–99)
GLUCOSE BLDC GLUCOMTR-MCNC: 111 MG/DL — HIGH (ref 70–99)
GLUCOSE BLDC GLUCOMTR-MCNC: 130 MG/DL — HIGH (ref 70–99)
GLUCOSE SERPL-MCNC: 109 MG/DL — HIGH (ref 70–99)
HCT VFR BLD CALC: 32.8 % — LOW (ref 39–50)
HGB BLD-MCNC: 10.4 G/DL — LOW (ref 13–17)
MAGNESIUM SERPL-MCNC: 2.1 MG/DL — SIGNIFICANT CHANGE UP (ref 1.6–2.6)
MCHC RBC-ENTMCNC: 28.3 PG — SIGNIFICANT CHANGE UP (ref 27–34)
MCHC RBC-ENTMCNC: 31.8 GM/DL — LOW (ref 32–36)
MCV RBC AUTO: 89 FL — SIGNIFICANT CHANGE UP (ref 80–100)
PLATELET # BLD AUTO: 176 K/UL — SIGNIFICANT CHANGE UP (ref 150–400)
POTASSIUM SERPL-MCNC: 3.3 MMOL/L — LOW (ref 3.5–5.3)
POTASSIUM SERPL-SCNC: 3.3 MMOL/L — LOW (ref 3.5–5.3)
RBC # BLD: 3.68 M/UL — LOW (ref 4.2–5.8)
RBC # FLD: 15.1 % — HIGH (ref 10.3–14.5)
SODIUM SERPL-SCNC: 144 MMOL/L — SIGNIFICANT CHANGE UP (ref 135–145)
SPECIMEN SOURCE: SIGNIFICANT CHANGE UP
WBC # BLD: 6.4 K/UL — SIGNIFICANT CHANGE UP (ref 3.8–10.5)
WBC # FLD AUTO: 6.4 K/UL — SIGNIFICANT CHANGE UP (ref 3.8–10.5)

## 2019-05-15 PROCEDURE — 99232 SBSQ HOSP IP/OBS MODERATE 35: CPT

## 2019-05-15 PROCEDURE — 99233 SBSQ HOSP IP/OBS HIGH 50: CPT | Mod: GC

## 2019-05-15 RX ORDER — FUROSEMIDE 40 MG
80 TABLET ORAL
Refills: 0 | Status: DISCONTINUED | OUTPATIENT
Start: 2019-05-15 | End: 2019-05-19

## 2019-05-15 RX ORDER — POTASSIUM CHLORIDE 20 MEQ
40 PACKET (EA) ORAL ONCE
Refills: 0 | Status: COMPLETED | OUTPATIENT
Start: 2019-05-15 | End: 2019-05-15

## 2019-05-15 RX ORDER — FUROSEMIDE 40 MG
40 TABLET ORAL ONCE
Refills: 0 | Status: COMPLETED | OUTPATIENT
Start: 2019-05-15 | End: 2019-05-15

## 2019-05-15 RX ADMIN — CARVEDILOL PHOSPHATE 12.5 MILLIGRAM(S): 80 CAPSULE, EXTENDED RELEASE ORAL at 21:12

## 2019-05-15 RX ADMIN — DIVALPROEX SODIUM 250 MILLIGRAM(S): 500 TABLET, DELAYED RELEASE ORAL at 06:27

## 2019-05-15 RX ADMIN — ISOSORBIDE DINITRATE 40 MILLIGRAM(S): 5 TABLET ORAL at 06:27

## 2019-05-15 RX ADMIN — Medication 1 MILLIGRAM(S): at 09:43

## 2019-05-15 RX ADMIN — SENNA PLUS 2 TABLET(S): 8.6 TABLET ORAL at 21:13

## 2019-05-15 RX ADMIN — SPIRONOLACTONE 25 MILLIGRAM(S): 25 TABLET, FILM COATED ORAL at 06:28

## 2019-05-15 RX ADMIN — CARVEDILOL PHOSPHATE 12.5 MILLIGRAM(S): 80 CAPSULE, EXTENDED RELEASE ORAL at 09:43

## 2019-05-15 RX ADMIN — Medication 3 MILLIGRAM(S): at 21:12

## 2019-05-15 RX ADMIN — Medication 100 MILLIGRAM(S): at 15:29

## 2019-05-15 RX ADMIN — Medication 100 MILLIGRAM(S): at 21:12

## 2019-05-15 RX ADMIN — Medication 100 MILLIGRAM(S): at 09:43

## 2019-05-15 RX ADMIN — Medication 300 MILLIGRAM(S): at 09:43

## 2019-05-15 RX ADMIN — Medication 40 MILLIEQUIVALENT(S): at 10:42

## 2019-05-15 RX ADMIN — HEPARIN SODIUM 5000 UNIT(S): 5000 INJECTION INTRAVENOUS; SUBCUTANEOUS at 15:28

## 2019-05-15 RX ADMIN — Medication 80 MILLIGRAM(S): at 17:43

## 2019-05-15 RX ADMIN — ISOSORBIDE DINITRATE 40 MILLIGRAM(S): 5 TABLET ORAL at 21:12

## 2019-05-15 RX ADMIN — DIVALPROEX SODIUM 250 MILLIGRAM(S): 500 TABLET, DELAYED RELEASE ORAL at 17:40

## 2019-05-15 RX ADMIN — Medication 81 MILLIGRAM(S): at 09:42

## 2019-05-15 RX ADMIN — ISOSORBIDE DINITRATE 40 MILLIGRAM(S): 5 TABLET ORAL at 15:29

## 2019-05-15 RX ADMIN — HEPARIN SODIUM 5000 UNIT(S): 5000 INJECTION INTRAVENOUS; SUBCUTANEOUS at 21:12

## 2019-05-15 RX ADMIN — HEPARIN SODIUM 5000 UNIT(S): 5000 INJECTION INTRAVENOUS; SUBCUTANEOUS at 06:27

## 2019-05-15 RX ADMIN — Medication 40 MILLIGRAM(S): at 10:45

## 2019-05-15 RX ADMIN — Medication 40 MILLIGRAM(S): at 06:27

## 2019-05-15 NOTE — PROGRESS NOTE ADULT - SUBJECTIVE AND OBJECTIVE BOX
[facility], Division of Infectious Diseases  DEONNA Enriquez A. Lee  [phone]    Name: NIK GRIMM  Age: 69y  Gender: Male  MRN: [id number]    Interval History--  Notes reviewed. Confused, confabulates a bit. Says he has been here for treatment of lymphoma.     Past Medical History--  Pleural effusion  Moderate mitral regurgitation  Systolic heart failure  Rhinitis, allergic  Essential hypertension  DM type 2 (diabetes mellitus, type 2)  Diabetes Mellitus  Non-Hodgkins Lymphoma  Non-Hodgkin lymphoma  No significant past surgical history      For details regarding the patient's social history, family history, and other miscellaneous elements, please refer the initial infectious diseases consultation and/or the admitting history and physical examination for this admission.    Allergies    No Known Allergies    Intolerances        Medications--  Antibiotics:    Immunologic:    Other:  ALBUTerol/ipratropium for Nebulization PRN  allopurinol  aspirin  chewable  carvedilol  dextrose 40% Gel PRN  dextrose 5%.  dextrose 50% Injectable  diVALproex DR  docusate sodium  folic acid  furosemide   Injectable  glucagon  Injectable PRN  heparin  Injectable  hydrALAZINE  insulin lispro (HumaLOG) corrective regimen sliding scale  isosorbide   dinitrate Tablet (ISORDIL)  melatonin  senna  sodium chloride 0.9% lock flush PRN  spironolactone  valproate sodium IVPB PRN      Review of Systems--  Not reliable but negative, for what it is worth    Physical Examination--  Vital Signs: T(F): 97.8 (05-15-19 @ 12:09), Max: 98.4 (05-14-19 @ 21:36)  HR: 60 (05-15-19 @ 12:09)  BP: 123/63 (05-15-19 @ 12:09)  RR: 18 (05-15-19 @ 05:11)  SpO2: 94% (05-15-19 @ 12:09)  Wt(kg): --  General: Nontoxic-appearing Male in no acute distress.  HEENT: AT/NC.  Anicteric. Conjunctiva pink and moist. Oropharynx clear.  Neck: Not rigid. No sense of mass.  Nodes: None palpable.  Lungs: Diminished breath sounds R>L without rales, wheezing or rhonchi  Heart: Regular rate and rhythm. II/VI systolic Murmur. No rub. No gallop. No palpable thrill.  Abdomen: Bowel sounds present and normoactive. Soft. Nondistended. Nontender. No sense of mass. No organomegaly.  Back: No spinal tenderness. No costovertebral angle tenderness.   Extremities: No cyanosis or clubbing. No edema.   Skin: Warm. Dry. Good turgor. No rash. No vasculitic stigmata.  Psychiatric: Remains confused.        Laboratory Studies--  CBC                        10.4   6.4   )-----------( 176      ( 15 May 2019 06:56 )             32.8       Chemistries  05-15    144  |  100  |  23  ----------------------------<  109<H>  3.3<L>   |  30  |  1.62<H>    Ca    8.6      15 May 2019 06:56  Phos  3.5     05-14  Mg     2.1     05-15    TPro  6.6  /  Alb  3.2<L>  /  TBili  2.3<H>  /  DBili  x   /  AST  17  /  ALT  10  /  AlkPhos  103  05-14      Culture Data    Culture - Blood (collected 10 May 2019 02:10)  Source: .Blood  Final Report (15 May 2019 03:00):    No growth at 5 days.    Culture - Blood (collected 10 May 2019 02:10)  Source: .Blood  Final Report (15 May 2019 03:00):    No growth at 5 days.    Culture - Blood (collected 09 May 2019 09:23)  Source: .Blood  Final Report (14 May 2019 10:00):    No growth at 5 days.    Culture - Blood (collected 09 May 2019 09:23)  Source: .Blood  Final Report (15 May 2019 15:04):    No growth at 5 days.

## 2019-05-15 NOTE — PROGRESS NOTE ADULT - PROBLEM SELECTOR PLAN 2
-Recurrence of malignancy w/ CNS involvement? - does this patient need an LP?  -Spoke with Neuro and Oncology - no further w/u from Neuro standpoint. No evidence of CNS pathology on MRI c/w malignancy  -Need to clarify patient's baseline MS with family - I was unable to reach them by phone today

## 2019-05-15 NOTE — PROGRESS NOTE ADULT - PROBLEM SELECTOR PLAN 3
-c/w current doses Isordil and Hydralazine  -c/w Coreg 12.5 bid  -c/w Aldactone 25  -Increase Lasix as above    HF will continue to follow    SUKHDEV Nascimento  Cardiology Fellow  [phone]

## 2019-05-15 NOTE — PROGRESS NOTE ADULT - ASSESSMENT
68 year old Non-Hodgkin's Lymphoma tx with chemotherapy in 2004, DM2 with CKD stage 3, HTN, CHF EF 35% on cardiac cath 12/ 2016, pleural effusion s/p left pleuracentesis in 10/2016, cardiomyopathy, gout and HLD presents to the ED sent in from cardiologists office because of AMS    # Respi failure/apnea/hypoxia-  sp Extubated  CxR-unchanged rt loculated small effusion, mild interstitial edema    # CHB sp micra ppm implant 5/10    #Acute encephalopathy. ?etiology  r/o acute delirium on vascular dementia  less likely infectious etiology,   -chronic parietal infarct in CT head   MR brain reviewed  , neuro cs fu  vitb12, folate, iron panel, rpr and tsh levels wnl   will consider LP r/o leptomeningeal involvement however will check CT c/a/P first   psych cs noted- depakote    # Fevers- afebrile  UA neg, CXR neg, bld cx ngtd  observe off abx       # acute on chronic  systolic heart failure.    - TTE EF 45%, likely severe MR, moderately enlarged RV , severe pulmonary hypertension. Estimated PASP = 75 mmHg.  - Medication non compliance  - titrate  furosemide w/ I+Os given signs of volume overload.   CT Sx for f/u   cont medical management       # Non-Hodgkin lymphoma of lymph nodes of neck, unspecified non-Hodgkin lymphoma type.  Plan: - treated with chemotherapy in 2014.   Anemia-monitor h/h  discussed [facility] : will check CT c/a/p    # Essential hypertension.  Plan: - bp stable  - c/w arb and beta blocker.     # Type 2 diabetes mellitus with chronic kidney disease, without long-term current use of insulin, unspecified CKD stage. Plan: - fs achs  - fs controlled  - start sliding scale insulin  - h    # Chronic gout without tophus, unspecified cause, unspecified site.   - c/w allopurinol 300 mg daily.

## 2019-05-15 NOTE — PROGRESS NOTE ADULT - SUBJECTIVE AND OBJECTIVE BOX
*****Structural Heart Team*****    Subjective:    Patient rsting comfortably in chair, offering no complaints. He is more aware of what is going on around him, but still with periods of confusion needed enhanced supervision.    PAST MEDICAL & SURGICAL HISTORY:  Pleural effusion  Moderate mitral regurgitation  Systolic heart failure  Rhinitis, allergic  Essential hypertension  DM type 2 (diabetes mellitus, type 2)  Non-Hodgkins Lymphoma  Non-Hodgkin lymphoma: h/o axillary dissection        T(C): 36.6 (05-15-19 @ 12:09), Max: 36.9 (05-14-19 @ 21:36)  HR: 60 (05-15-19 @ 15:26) (59 - 62)  BP: 124/56 (05-15-19 @ 15:26) (95/50 - 134/66)  RR: 18 (05-15-19 @ 05:11) (18 - 18)  SpO2: 94% (05-15-19 @ 12:09) (93% - 97%)  Wt(kg): --  05-14 @ 07:01  -  05-15 @ 07:00  --------------------------------------------------------  IN: 640 mL / OUT: 1400 mL / NET: -760 mL    05-15 @ 07:01  -  05-15 @ 16:26  --------------------------------------------------------  IN: 0 mL / OUT: 700 mL / NET: -700 mL      MEDICATIONS  (STANDING):  allopurinol 300 milliGRAM(s) Oral daily  aspirin  chewable 81 milliGRAM(s) Oral daily  carvedilol 12.5 milliGRAM(s) Oral every 12 hours  dextrose 5%. 1000 milliLiter(s) (50 mL/Hr) IV Continuous <Continuous>  dextrose 50% Injectable 12.5 Gram(s) IV Push once  diVALproex  milliGRAM(s) Oral two times a day  docusate sodium 100 milliGRAM(s) Oral three times a day  folic acid 1 milliGRAM(s) Oral daily  furosemide   Injectable 80 milliGRAM(s) IV Push two times a day  heparin  Injectable 5000 Unit(s) SubCutaneous every 8 hours  hydrALAZINE 100 milliGRAM(s) Oral every 8 hours  insulin lispro (HumaLOG) corrective regimen sliding scale   SubCutaneous three times a day before meals  isosorbide   dinitrate Tablet (ISORDIL) 40 milliGRAM(s) Oral every 8 hours  melatonin 3 milliGRAM(s) Oral at bedtime  senna 2 Tablet(s) Oral at bedtime  spironolactone 25 milliGRAM(s) Oral daily    MEDICATIONS  (PRN):  ALBUTerol/ipratropium for Nebulization 3 milliLiter(s) Nebulizer every 6 hours PRN Bronchospasm  dextrose 40% Gel 15 Gram(s) Oral once PRN Blood Glucose LESS THAN 70 milliGRAM(s)/deciliter  glucagon  Injectable 1 milliGRAM(s) IntraMuscular once PRN Glucose LESS THAN 70 milligrams/deciliter  sodium chloride 0.9% lock flush 10 milliLiter(s) IV Push every 1 hour PRN Pre/post blood products, medications, blood draw, and to maintain line patency  valproate sodium IVPB 125 milliGRAM(s) IV Intermittent every 8 hours PRN agitation      Review of Symptoms:  Constitutional: Awake, Alert, Follows commands  Respiratory: Denies SOB  Cardiac: Denies CP, Denies Palpitations  Gastrointestinal: Denies Pain, Denies N/V, tolerating po intake  Vascular: Negative  Extremities: No Edema, No joint pain or swelling  Neurological: Negative  Endocrine: No heat or cold intolerance, No excessive thirst  Heme/Onc: Negative    Exam:  General:  HEENT: Supple, No JVD, Trachea midline, no masses  Pulmonary: Diminished at bases with fine crackles , = Chest Excursion, no accessory muscle use  Cor: S1S2, RRR, III/VI ARTEM , No Gallops/Rubs noted  ECG: SR  Gastrointestinal: Soft, NT/ND, + Bowel Sounds  Neuro: = motor and sensory B/L, No focal deficits, periods of confusion  Vascular: 1+ pulses B/L, no carotid Bruits  Extremities: No Edema noted, + PMSx4  Skin: Warm/Dry/Normal color, Normal turgor, no rashes                          10.4   6.4   )-----------( 176      ( 15 May 2019 06:56 )             32.8   05-15    144  |  100  |  23  ----------------------------<  109<H>  3.3<L>   |  30  |  1.62<H>    Ca    8.6      15 May 2019 06:56  Phos  3.5     05-14  Mg     2.1     05-15    TPro  6.6  /  Alb  3.2<L>  /  TBili  2.3<H>  /  DBili  x   /  AST  17  /  ALT  10  /  AlkPhos  103  05-14      Assesment/Plan:  69 Male with Severe MR, Acute on Chronic Systolic Heart Failure, Complete Heart Block, Altered Mental Status    1.) Severe MR: Patient KUSHAL uploaded to [employer] [employer]. Still discussing with Structural Heart Team the best treatment strategy for him, which may wind up being a traditional surgical Mitral Valve Repair or Replacement. Either way, patient would probably be best served being discharged from the hospital once cleared from an AMS standpoint, and brought back for the procedure.  2.) Acute on Chronic HF: Heart failure input appreciated. Will continue to diurese, continue current regimen  3.) CHB: Patient now with Micra PPM, F/U EPS  4.)Altered Mental Status: Continue workup for the nature of AMS.    VIVIAN Minor  [id number]

## 2019-05-15 NOTE — PROGRESS NOTE ADULT - PROBLEM SELECTOR PLAN 1
-c/w Hydralazine 100 tid and Isordil 40 tid  -Give Lasix 40mg IVP now and increase Lasix to 80mg IVP bid  -f/u Structural recs

## 2019-05-15 NOTE — PHYSICAL THERAPY INITIAL EVALUATION ADULT - PERTINENT HX OF CURRENT PROBLEM, REHAB EVAL
Pt is a 69 y.o. male with PMHx of Non-Hodgkin's Lymphoma tx with chemotherapy in 2004, DM2 with CKD stage 3, HTN, CHF EF 35% on cardiac cath 12/2016, pleural effusion s/p left pleuracentesis in 10/2016, cardiomyopathy, gout and HLD presently admitted to CCU for complete Heart block with RBBB. On RHC/LHC shown to have elevated filling pressures and CAD however nonobstructive. On 5/10 pt underwent Micra PPM w/ VVI 60. On 5/11 pt was extubated. Continued below.

## 2019-05-15 NOTE — PROGRESS NOTE ADULT - ASSESSMENT
Mr. Skaggs is a 69 year old man with prior NHL (treated with R-CHOP 2004 and BR in 2010), chronic HFrEF (likely 2/2 Doxorubicin, EF:45%, decreased RVSF) who was sent to the ED on 5/8 with AMS and heart block - he progressed to complete heart block with encephalopathy requiring intubation and a leadless pacemaker.  His hospital course has included a KUSHAL revealing severe MR 2/2 a dilated MV annulus, severe pHTN (likely post-capillary 2/2 MR). HF asked to follow during the work up for a MitraClip. He remains hypervolemic and is tolerating afterload reducing agents.  No clear explanation for the patient's dementia (lack of fluctuation points away from delirium

## 2019-05-15 NOTE — PROGRESS NOTE ADULT - ASSESSMENT
5/8 as the above record indicates, the pt is here and is a very poor historian. I was asked by Dr Hawkins from oncology and by the pt's internist to see the pt as he did have a past of lymphoma. The oncology office will have to look up records as his history is not readily available. When I came to see the pt he was not able to supply much info about his cancer other than he had lymphoma and did not know the type or who treated him. Dr Hawkins thinks he may have been treated by his previous associate, Dr Sarmiento.     At the time of my visit the pt was alert and oriented but was not able to give specific details about any of his medical issues. His chemistries appeared all unremarkable and he was not febrile and his ct of the head showed nothing of note.  I will see if there are any records on his previous onc history and see if there is any reason to think it may have contributed to his present admit here.     5/9 events of last day was noted and pt was intubated and had heart failure. The records were reviewed at 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 In 2010 he went to [facility] and was treated for a recurrence with BR and he has remained in remission.   5/10 still intubated and sedated and labs reviewed and thus far no direct evidence for lymphoma recurrence.  . 5/11 Pt still intubated and sedated. Anemia from icu anemia causes no evidence for recurrent lymphoma.   5/12 the pt was extubated on this date and hemoglobin was noted to be 11.0 will look when stable for any evidenced of recurrent lymphoma.   5/13 notes reviewed and pt still looked somewhat disoriented will check him for nodes in the am counts ok.  . 5/14 stable but confused ands seen by neuro who feels confusion is from the cardiac problems. Pt to be evaluated for MV repair.   5/15 very poor memory and cognitive decline in this pt as he could not remember 4 objects one minute later. He has good right sided mental abilities and good spacial ability. We do not know how long or fast this cognitive decline has been in play. Will have to try to reach out to family members, he cannot recall being treated for his lymphoma. Ct head without contrast. Renal function is poor, so that we are limited with ct of the brain and body without contrast. ? multiinfarct dementia neuro cognitive studies check B12 and tsh levels.

## 2019-05-15 NOTE — PROGRESS NOTE ADULT - SUBJECTIVE AND OBJECTIVE BOX
Patient is a 69y old  Male who presents with a chief complaint of confusion (15 May 2019 16:26)                                                               INTERVAL HPI/OVERNIGHT EVENTS:    REVIEW OF SYSTEMS:     CONSTITUTIONAL: No weakness, fevers or chills  RESPIRATORY: No cough, wheezing,  No shortness of breath  CARDIOVASCULAR: No chest pain or palpitations  GASTROINTESTINAL: No abdominal pain  . No nausea, vomiting, or hematemesis; No diarrhea or constipation. No melena or hematochezia.  GENITOURINARY: No dysuria, frequency or hematuria  NEUROLOGICAL: No numbness or weakness                                                                                                                                                                                                                                                                                 Medications:  MEDICATIONS  (STANDING):  allopurinol 300 milliGRAM(s) Oral daily  aspirin  chewable 81 milliGRAM(s) Oral daily  carvedilol 12.5 milliGRAM(s) Oral every 12 hours  dextrose 5%. 1000 milliLiter(s) (50 mL/Hr) IV Continuous <Continuous>  dextrose 50% Injectable 12.5 Gram(s) IV Push once  diVALproex  milliGRAM(s) Oral two times a day  docusate sodium 100 milliGRAM(s) Oral three times a day  folic acid 1 milliGRAM(s) Oral daily  furosemide   Injectable 80 milliGRAM(s) IV Push two times a day  heparin  Injectable 5000 Unit(s) SubCutaneous every 8 hours  hydrALAZINE 100 milliGRAM(s) Oral every 8 hours  insulin lispro (HumaLOG) corrective regimen sliding scale   SubCutaneous three times a day before meals  isosorbide   dinitrate Tablet (ISORDIL) 40 milliGRAM(s) Oral every 8 hours  melatonin 3 milliGRAM(s) Oral at bedtime  senna 2 Tablet(s) Oral at bedtime  spironolactone 25 milliGRAM(s) Oral daily    MEDICATIONS  (PRN):  ALBUTerol/ipratropium for Nebulization 3 milliLiter(s) Nebulizer every 6 hours PRN Bronchospasm  dextrose 40% Gel 15 Gram(s) Oral once PRN Blood Glucose LESS THAN 70 milliGRAM(s)/deciliter  glucagon  Injectable 1 milliGRAM(s) IntraMuscular once PRN Glucose LESS THAN 70 milligrams/deciliter  sodium chloride 0.9% lock flush 10 milliLiter(s) IV Push every 1 hour PRN Pre/post blood products, medications, blood draw, and to maintain line patency  valproate sodium IVPB 125 milliGRAM(s) IV Intermittent every 8 hours PRN agitation       Allergies    No Known Allergies    Intolerances      Vital Signs Last 24 Hrs  T(C): 36.7 (15 May 2019 19:58), Max: 36.7 (15 May 2019 19:58)  T(F): 98 (15 May 2019 19:58), Max: 98 (15 May 2019 19:58)  HR: 60 (15 May 2019 19:58) (59 - 62)  BP: 131/55 (15 May 2019 19:58) (107/50 - 137/60)  BP(mean): --  RR: 18 (15 May 2019 19:58) (18 - 18)  SpO2: 98% (15 May 2019 19:58) (93% - 98%)  CAPILLARY BLOOD GLUCOSE      POCT Blood Glucose.: 111 mg/dL (15 May 2019 17:44)  POCT Blood Glucose.: 130 mg/dL (15 May 2019 13:00)  POCT Blood Glucose.: 106 mg/dL (15 May 2019 09:16)  POCT Blood Glucose.: 109 mg/dL (14 May 2019 21:59)       @ 07:01  -  05-15 @ 07:00  --------------------------------------------------------  IN: 640 mL / OUT: 1400 mL / NET: -760 mL    05-15 @ 07:01  -  05-15 @ 21:46  --------------------------------------------------------  IN: 0 mL / OUT: 1350 mL / NET: -1350 mL      Physical Exam:    Daily Weight in k (15 May 2019 05:11)  General:  NAD   HEENT:  Nonicteric, PERRLA  CV:  RRR, S1S2   Lungs: decreased at Bases   Abdomen:  Soft, non-tender, no distended, positive BS  Extremities:  edema  Skin:  Warm and dry, no rashes  :  No mittal  Neuro:  Alert  NF  but not oriented to time or place                                                                                                                                                                                                                                                                                                 LABS:                               10.4   6.4   )-----------( 176      ( 15 May 2019 06:56 )             32.8                      05-15    144  |  100  |  23  ----------------------------<  109<H>  3.3<L>   |  30  |  1.62<H>    Ca    8.6      15 May 2019 06:56  Phos  3.5     -14  Mg     2.1     05-15    TPro  6.6  /  Alb  3.2<L>  /  TBili  2.3<H>  /  DBili  x   /  AST  17  /  ALT  10  /  AlkPhos  103  14

## 2019-05-15 NOTE — PROGRESS NOTE ADULT - ASSESSMENT
69M w/ PMHx  NHL s/p R-CHOP 2004, CHF/CMP (chemo related?) presenting with confusion and complete heart block. Now s/p extubation, placement of pacemaker, and now being considered for MV repair(MitraClip vs MVR).   Remains confused. I doubt this is on an infectious basis.

## 2019-05-15 NOTE — PHYSICAL THERAPY INITIAL EVALUATION ADULT - ADDITIONAL COMMENTS
Pt lives with his wife in a apt with 3 steps to enter and +elevator inside. Pt does not use a AD for ambulation. Pt is independent with all ADLs and ambulation. +reading eyeglasses. +drives.

## 2019-05-15 NOTE — PROGRESS NOTE ADULT - SUBJECTIVE AND OBJECTIVE BOX
Patient is a 68y old  Male who presents with a chief complaint of confusion (08 May 2019 12:11)      HPI:  ** Patient poor historian **    Patient is a 68 year old Non-Hodgkin's Lymphoma tx with chemotherapy in , DM2 with CKD stage 3, HTN, CHF EF 35% on cardiac cath 2016, pleural effusion s/p left pleuracentesis in 10/2016, cardiomyopathy, gout and HLD presents to the ED sent in from cardiologists office because of EKG changes. Patient is not sure why he is here. He was not sure where he was, why he is here or what even the year is. He remarks that he retired a few months ago and he stopped taking his medications then because he "wasn't feeling good". He states he was wife his wife earlier but then later he states shes in [place]. Patient currently denies chest pain, shortness of breath, palpitations, syncope, fevers, chills, recent travel or sick contacts. No new meds however he has stopped taking most of his meds. He overall, feels fine and is not sure why he is in the hospital.     It's very difficult to get a clear history from patient. I have tried to reach his daughter but have not received a call back as of yet. I have called [facility] patients pharmacy and he states patient picked up furosemide and irbersartan in April but has not picked up any other meds in months. Mentation in  AAOX3.    In the ED, VSS. Labs at baseline, CT head with old stroke, Trops elevated and peaked at 50, now normal, EKG with TWI in lateral leads. (08 May 2019 10:33)    5 as the above record indicates, the pt is here and is a very poor historian. I was asked by Dr Hawkins from oncology and by the pt's internist to see the pt as he did have a past of lymphoma. The oncology office will have to look up records as his history is not readily available. When I came to see the pt he was not able to supply much info about his cancer other than he had lymphoma and did not know the type or who treated him. Dr Hawkins thinks he may have been treated by his previous associate, Dr Sarmiento.     At the time of my visit the pt was alert and oriented but was not able to give specific details about any of his medical issues. His chemistries appeared all unremarkable and he was not febrile and his ct of the head showed nothing of note.  I will see if there are any records on his previous onc history and see if there is any reason to think it may have contributed to his present admit here.  events of last day was noted and pt was intubated and had heart failure. The records were reviewed at [facility] and pt in  had a large cell lymphoma and was treated with R CHOP. In  he went to [facility] and was treated for a recurrence with BR and he has remained in remission. 5/10 still intubated and sedated and labs reviewed and thus far no direct evidence for lymphoma recurrence.  Pt still intubated and sedated. Anemia from icu anemia causes no evidence for recurrent lymphoma.  the pt was extubated on this date and hemoglobin was noted to be 11.0 will look when stable for any evidenced of recurrent lymphoma.  notes reviewed and pt still looked somewhat disoriented will check him for nodes in the am counts ok.  stable but confused ands seen by neuro who feels confusion is from the cardiac problems. Pt to be evaluated for MV repair. 5/15 very poor memory and cognitive decline in this pt as he could not remember 4 objects one minute later. He has good right sided mental abilities and good spacial ability. We do not know how long or fast this cognitive decline has been in play. Will have to try to reach out to family members, he cannot recall being treated for his lymphoma. Ct head without contrast. Renal function is poor, so that we are limited with ct of the brain and body without contrast. ? multiinfarct dementia neuro cognitive studies check B12 and tsh levels.  MEDICATIONS  (STANDING):  allopurinol 300 milliGRAM(s) Oral daily  aspirin  chewable 81 milliGRAM(s) Oral daily  carvedilol 12.5 milliGRAM(s) Oral every 12 hours  chlorhexidine 4% Liquid 1 Application(s) Topical <User Schedule>  dextrose 5%. 1000 milliLiter(s) (50 mL/Hr) IV Continuous <Continuous>  dextrose 50% Injectable 12.5 Gram(s) IV Push once  docusate sodium 100 milliGRAM(s) Oral three times a day  folic acid 1 milliGRAM(s) Oral daily  heparin  Injectable 5000 Unit(s) SubCutaneous every 8 hours  hydrALAZINE 100 milliGRAM(s) Oral every 8 hours  insulin lispro (HumaLOG) corrective regimen sliding scale   SubCutaneous three times a day before meals  isosorbide   dinitrate Tablet (ISORDIL) 40 milliGRAM(s) Oral every 8 hours  nitroglycerin  Infusion 16.667 MICROgram(s)/Min (5 mL/Hr) IV Continuous <Continuous>  piperacillin/tazobactam IVPB. 3.375 Gram(s) IV Intermittent every 8 hours  senna 2 Tablet(s) Oral at bedtime  spironolactone 25 milliGRAM(s) Oral daily                         10.9   7.4   )-----------( 177      ( 14 May 2019 06:56 )             33.5     138  |  98  |  16  ----------------------------<  121<H>  3.5   |  28  |  1.42<H>    Ca    8.6      12 May 2019 05:10  Phos  3.6     05-12  Mg     2.2     05-12    TPro  6.5  /  Alb  3.0<L>  /  TBili  1.6<H>  /  DBili  x   /  AST  12  /  ALT  9<L>  /  AlkPhos  108  05-12    Urinalysis Basic - ( 08 May 2019 00:17 )    Color: Yellow / Appearance: Slightly Turbid / S.023 / pH: x  Gluc: x / Ketone: Negative  / Bili: Small / Urobili: 3 mg/dL   Blood: x / Protein: 300 mg/dL / Nitrite: Negative   Leuk Esterase: Negative / RBC: 2 /hpf / WBC 7 /HPF   Sq Epi: x / Non Sq Epi: 1 /hpf / Bacteria: Negative        ROS:  Negative except for:    PAST MEDICAL & SURGICAL HISTORY:  Pleural effusion  Moderate mitral regurgitation  Systolic heart failure  Rhinitis, allergic  Essential hypertension  DM type 2 (diabetes mellitus, type 2)  Non-Hodgkins Lymphoma  Non-Hodgkin lymphoma: h/o axillary dissection      SOCIAL HISTORY:    FAMILY HISTORY:  Family history of non-Hodgkin's lymphoma (Sibling)  Family history of CHF (congestive heart failure)      Allergies    No Known Allergies    Intolerances        PHYSICAL EXAM  General: lying in the bed and was being cared for by the staff and appeared still to be somewhat confused.  HEENT: stable   Neck: supple  CV: rr   Lungs: decreased breath sounds at the bases  Abdomen: soft non-tender non-distended, no hepatosplenomegaly  Ext: no clubbing cyanosis or edema  Skin: no rashes and no petechiae  Neuro: alert and oriented X3 no focal deficits    BLOOD SMEAR INTERPRETATION:    RADIOLOGY :

## 2019-05-15 NOTE — PHYSICAL THERAPY INITIAL EVALUATION ADULT - DID THE PATIENT HAVE SURGERY?
Cardiac Cath 5/10/19: Right heart catheterization. Left heart catheterization. Left coronary angiography. Right coronary angiography. Pt s/p PAUL RHC/LC on 5/10/19.

## 2019-05-15 NOTE — PHYSICAL THERAPY INITIAL EVALUATION ADULT - PLANNED THERAPY INTERVENTIONS, PT EVAL
strengthening/GOAL: Stair Negotiation Training: Patient will be able to negotiate up & down 3 steps with unilateral rail, step to gait pattern, in 2 weeks./balance training/gait training/transfer training/bed mobility training

## 2019-05-15 NOTE — PROGRESS NOTE ADULT - SUBJECTIVE AND OBJECTIVE BOX
Patient seen and examined at bedside on 3 DSU, no family at bedside, unable to reach family to obtain further collateral    Overnight Events: No events overnight, Mariann was D/C'ed on 5/14 per ID recs    Review Of Systems: Unable to obtain ROS as pt is AOx1. He does report he feels fine. He states he retired 1m prior to admission and notes that he had been confused for 2 weeks before coming ot the hospital. Flowsheetts reviewed - it does not appear the patient has sustained hypotension in the CCU including the aquiles-intubation period           Current Meds:  ALBUTerol/ipratropium for Nebulization 3 milliLiter(s) Nebulizer every 6 hours PRN  allopurinol 300 milliGRAM(s) Oral daily  aspirin  chewable 81 milliGRAM(s) Oral daily  carvedilol 12.5 milliGRAM(s) Oral every 12 hours  diVALproex  milliGRAM(s) Oral two times a day  docusate sodium 100 milliGRAM(s) Oral three times a day  folic acid 1 milliGRAM(s) Oral daily  furosemide   Injectable 40 milliGRAM(s) IV Push every 12 hours  heparin  Injectable 5000 Unit(s) SubCutaneous every 8 hours  hydrALAZINE 100 milliGRAM(s) Oral every 8 hours  insulin lispro (HumaLOG) corrective regimen sliding scale   SubCutaneous three times a day before meals  isosorbide   dinitrate Tablet (ISORDIL) 40 milliGRAM(s) Oral every 8 hours  melatonin 3 milliGRAM(s) Oral at bedtime  senna 2 Tablet(s) Oral at bedtime  sodium chloride 0.9% lock flush 10 milliLiter(s) IV Push every 1 hour PRN  spironolactone 25 milliGRAM(s) Oral daily  valproate sodium IVPB 125 milliGRAM(s) IV Intermittent every 8 hours PRN    Vitals:  T(F): 97.9 (05-15), Max: 98.4 (05-14)  HR: 62 (05-15) (58 - 62)  BP: 107/50 (05-15) (95/50 - 127/66)  RR: 18 (05-15)  SpO2: 93% on 2L NC    I&O's Summary    14 May 2019 07:01  -  15 May 2019 07:00  --------------------------------------------------------  IN: 640 mL / OUT: 1400 mL / NET: -760 mL    Weight: 88kg (bed) <-- 97.5 (bed - this is the last weight that is documented - from 5/9)    PHYSICAL EXAM  GENERAL: AOx1 (name), pleasant, breathing comfortably on RA  HEAD:  Atraumatic, Normocephalic  ENT: EOMI, PERRLA, conjunctiva and sclera clear, Neck supple, JVD elevated to mid neck, moist mucosa  CHEST/LUNG: no rales  BACK: No spinal tenderness  HEART: Regular rate and rhythm; 2/6 systolic murmur at the apex, no rubs, or gallops  ABDOMEN: Soft, Nontender, Nondistended; Bowel sounds present  EXTREMITIES:  No clubbing, cyanosis, + b/l LE edema, LE warm to touch  PSYCH: Nl behavior, nl affect  SKIN: Normal color, No rashes or lesions  LINES: PIV                          10.4   6.4   )-----------( 176      ( 15 May 2019 06:56 )             32.8     Remainder of am labs pending     Interpretation of Telemetry: Sinus w/ CHB and V pacing in the 60s - 70s w/ PVCs Patient seen and examined at bedside on 3 DSU, no family at bedside, unable to reach family to obtain further collateral    Overnight Events: No events overnight, Mariann was D/C'ed on 5/14 per ID recs    Review Of Systems: Unable to obtain ROS as pt is AOx1. He does report he feels fine. He states he retired 1m prior to admission and notes that he had been confused for 2 weeks before coming ot the hospital. Flowsheets reviewed - it does not appear the patient sustained hypotension in the CCU including the aquiles-intubation period           Current Meds:  ALBUTerol/ipratropium for Nebulization 3 milliLiter(s) Nebulizer every 6 hours PRN  allopurinol 300 milliGRAM(s) Oral daily  aspirin  chewable 81 milliGRAM(s) Oral daily  carvedilol 12.5 milliGRAM(s) Oral every 12 hours  diVALproex  milliGRAM(s) Oral two times a day  docusate sodium 100 milliGRAM(s) Oral three times a day  folic acid 1 milliGRAM(s) Oral daily  furosemide   Injectable 40 milliGRAM(s) IV Push every 12 hours  heparin  Injectable 5000 Unit(s) SubCutaneous every 8 hours  hydrALAZINE 100 milliGRAM(s) Oral every 8 hours  insulin lispro (HumaLOG) corrective regimen sliding scale   SubCutaneous three times a day before meals  isosorbide   dinitrate Tablet (ISORDIL) 40 milliGRAM(s) Oral every 8 hours  melatonin 3 milliGRAM(s) Oral at bedtime  senna 2 Tablet(s) Oral at bedtime  sodium chloride 0.9% lock flush 10 milliLiter(s) IV Push every 1 hour PRN  spironolactone 25 milliGRAM(s) Oral daily  valproate sodium IVPB 125 milliGRAM(s) IV Intermittent every 8 hours PRN    Vitals:  T(F): 97.9 (05-15), Max: 98.4 (05-14)  HR: 62 (05-15) (58 - 62)  BP: 107/50 (05-15) (95/50 - 127/66)  RR: 18 (05-15)  SpO2: 93% on 2L NC    I&O's Summary    14 May 2019 07:01  -  15 May 2019 07:00  --------------------------------------------------------  IN: 640 mL / OUT: 1400 mL / NET: -760 mL    Weight: 88kg (bed) <-- 97.5 (bed - this is the last weight that is documented - from 5/9)    PHYSICAL EXAM  GENERAL: AOx1 (name), pleasant, breathing comfortably on RA  HEAD:  Atraumatic, Normocephalic  ENT: EOMI, PERRLA, conjunctiva and sclera clear, Neck supple, JVD elevated to mid neck, moist mucosa  CHEST/LUNG: no rales  BACK: No spinal tenderness  HEART: Regular rate and rhythm; 2/6 systolic murmur at the apex, no rubs, or gallops  ABDOMEN: Soft, Nontender, Nondistended; Bowel sounds present  EXTREMITIES:  No clubbing, cyanosis, + b/l LE edema, LE warm to touch  PSYCH: Nl behavior, nl affect  SKIN: Normal color, No rashes or lesions  LINES: PIV                          10.4   6.4   )-----------( 176      ( 15 May 2019 06:56 )             32.8     Remainder of am labs pending     Interpretation of Telemetry: Sinus w/ CHB and V pacing in the 60s - 70s w/ PVCs

## 2019-05-16 DIAGNOSIS — F03.90 UNSPECIFIED DEMENTIA WITHOUT BEHAVIORAL DISTURBANCE: ICD-10-CM

## 2019-05-16 LAB
ANION GAP SERPL CALC-SCNC: 14 MMOL/L — SIGNIFICANT CHANGE UP (ref 5–17)
BUN SERPL-MCNC: 24 MG/DL — HIGH (ref 7–23)
CALCIUM SERPL-MCNC: 8.7 MG/DL — SIGNIFICANT CHANGE UP (ref 8.4–10.5)
CHLORIDE SERPL-SCNC: 103 MMOL/L — SIGNIFICANT CHANGE UP (ref 96–108)
CO2 SERPL-SCNC: 31 MMOL/L — SIGNIFICANT CHANGE UP (ref 22–31)
CREAT SERPL-MCNC: 1.53 MG/DL — HIGH (ref 0.5–1.3)
GLUCOSE BLDC GLUCOMTR-MCNC: 108 MG/DL — HIGH (ref 70–99)
GLUCOSE BLDC GLUCOMTR-MCNC: 110 MG/DL — HIGH (ref 70–99)
GLUCOSE BLDC GLUCOMTR-MCNC: 114 MG/DL — HIGH (ref 70–99)
GLUCOSE BLDC GLUCOMTR-MCNC: 124 MG/DL — HIGH (ref 70–99)
GLUCOSE SERPL-MCNC: 102 MG/DL — HIGH (ref 70–99)
HCT VFR BLD CALC: 33.2 % — LOW (ref 39–50)
HGB BLD-MCNC: 10.3 G/DL — LOW (ref 13–17)
MCHC RBC-ENTMCNC: 27.8 PG — SIGNIFICANT CHANGE UP (ref 27–34)
MCHC RBC-ENTMCNC: 31.1 GM/DL — LOW (ref 32–36)
MCV RBC AUTO: 89.3 FL — SIGNIFICANT CHANGE UP (ref 80–100)
PLATELET # BLD AUTO: 203 K/UL — SIGNIFICANT CHANGE UP (ref 150–400)
POTASSIUM SERPL-MCNC: 3.4 MMOL/L — LOW (ref 3.5–5.3)
POTASSIUM SERPL-SCNC: 3.4 MMOL/L — LOW (ref 3.5–5.3)
RBC # BLD: 3.72 M/UL — LOW (ref 4.2–5.8)
RBC # FLD: 15.4 % — HIGH (ref 10.3–14.5)
SODIUM SERPL-SCNC: 148 MMOL/L — HIGH (ref 135–145)
WBC # BLD: 6.6 K/UL — SIGNIFICANT CHANGE UP (ref 3.8–10.5)
WBC # FLD AUTO: 6.6 K/UL — SIGNIFICANT CHANGE UP (ref 3.8–10.5)

## 2019-05-16 PROCEDURE — 99233 SBSQ HOSP IP/OBS HIGH 50: CPT | Mod: GC

## 2019-05-16 PROCEDURE — 71250 CT THORAX DX C-: CPT | Mod: 26

## 2019-05-16 PROCEDURE — 74176 CT ABD & PELVIS W/O CONTRAST: CPT | Mod: 26

## 2019-05-16 RX ORDER — POTASSIUM CHLORIDE 20 MEQ
20 PACKET (EA) ORAL ONCE
Refills: 0 | Status: DISCONTINUED | OUTPATIENT
Start: 2019-05-16 | End: 2019-05-16

## 2019-05-16 RX ORDER — POTASSIUM CHLORIDE 20 MEQ
40 PACKET (EA) ORAL ONCE
Refills: 0 | Status: COMPLETED | OUTPATIENT
Start: 2019-05-16 | End: 2019-05-16

## 2019-05-16 RX ADMIN — DIVALPROEX SODIUM 250 MILLIGRAM(S): 500 TABLET, DELAYED RELEASE ORAL at 18:46

## 2019-05-16 RX ADMIN — DIVALPROEX SODIUM 250 MILLIGRAM(S): 500 TABLET, DELAYED RELEASE ORAL at 06:01

## 2019-05-16 RX ADMIN — Medication 100 MILLIGRAM(S): at 21:48

## 2019-05-16 RX ADMIN — HEPARIN SODIUM 5000 UNIT(S): 5000 INJECTION INTRAVENOUS; SUBCUTANEOUS at 16:29

## 2019-05-16 RX ADMIN — ISOSORBIDE DINITRATE 40 MILLIGRAM(S): 5 TABLET ORAL at 06:02

## 2019-05-16 RX ADMIN — Medication 80 MILLIGRAM(S): at 06:01

## 2019-05-16 RX ADMIN — Medication 3 MILLIGRAM(S): at 21:48

## 2019-05-16 RX ADMIN — CARVEDILOL PHOSPHATE 12.5 MILLIGRAM(S): 80 CAPSULE, EXTENDED RELEASE ORAL at 21:48

## 2019-05-16 RX ADMIN — SPIRONOLACTONE 25 MILLIGRAM(S): 25 TABLET, FILM COATED ORAL at 06:02

## 2019-05-16 RX ADMIN — Medication 100 MILLIGRAM(S): at 06:01

## 2019-05-16 RX ADMIN — Medication 300 MILLIGRAM(S): at 09:54

## 2019-05-16 RX ADMIN — Medication 100 MILLIGRAM(S): at 06:02

## 2019-05-16 RX ADMIN — Medication 1 MILLIGRAM(S): at 09:54

## 2019-05-16 RX ADMIN — CARVEDILOL PHOSPHATE 12.5 MILLIGRAM(S): 80 CAPSULE, EXTENDED RELEASE ORAL at 09:54

## 2019-05-16 RX ADMIN — Medication 81 MILLIGRAM(S): at 09:54

## 2019-05-16 RX ADMIN — Medication 100 MILLIGRAM(S): at 21:47

## 2019-05-16 RX ADMIN — Medication 100 MILLIGRAM(S): at 16:29

## 2019-05-16 RX ADMIN — HEPARIN SODIUM 5000 UNIT(S): 5000 INJECTION INTRAVENOUS; SUBCUTANEOUS at 06:02

## 2019-05-16 RX ADMIN — ISOSORBIDE DINITRATE 40 MILLIGRAM(S): 5 TABLET ORAL at 21:48

## 2019-05-16 RX ADMIN — ISOSORBIDE DINITRATE 40 MILLIGRAM(S): 5 TABLET ORAL at 16:30

## 2019-05-16 RX ADMIN — HEPARIN SODIUM 5000 UNIT(S): 5000 INJECTION INTRAVENOUS; SUBCUTANEOUS at 21:48

## 2019-05-16 RX ADMIN — Medication 80 MILLIGRAM(S): at 18:46

## 2019-05-16 RX ADMIN — Medication 40 MILLIEQUIVALENT(S): at 09:53

## 2019-05-16 RX ADMIN — SENNA PLUS 2 TABLET(S): 8.6 TABLET ORAL at 21:48

## 2019-05-16 NOTE — PROGRESS NOTE ADULT - ASSESSMENT
1. Confusion delirium etiology unclear  2. Severe pulmonary hypertension  3 Mitral regurgitation  4. Moderate LV dysfunction  5. Complete heart block status post pacemaker    ecommendations  Continue present regimen of medications but may need to cut back on hydralazine if blood pressure remains low  Structural heart disease /CT surgical eval appreciated  further workup of dementia confusion ?LP repeat CT pending  discharge to rehab for now and reevaluate re: MVR/mitral clip

## 2019-05-16 NOTE — PROGRESS NOTE ADULT - SUBJECTIVE AND OBJECTIVE BOX
Subjective: Patient seen and examined. No new events except as noted.   still confused but awake alert denies sob or cp  structural heart and CT surgical consultation appredicated    MEDICATIONS:  MEDICATIONS  (STANDING):  allopurinol 300 milliGRAM(s) Oral daily  aspirin  chewable 81 milliGRAM(s) Oral daily  carvedilol 12.5 milliGRAM(s) Oral every 12 hours  dextrose 5%. 1000 milliLiter(s) (50 mL/Hr) IV Continuous <Continuous>  dextrose 50% Injectable 12.5 Gram(s) IV Push once  diVALproex  milliGRAM(s) Oral two times a day  docusate sodium 100 milliGRAM(s) Oral three times a day  folic acid 1 milliGRAM(s) Oral daily  furosemide   Injectable 80 milliGRAM(s) IV Push two times a day  heparin  Injectable 5000 Unit(s) SubCutaneous every 8 hours  hydrALAZINE 100 milliGRAM(s) Oral every 8 hours  insulin lispro (HumaLOG) corrective regimen sliding scale   SubCutaneous three times a day before meals  isosorbide   dinitrate Tablet (ISORDIL) 40 milliGRAM(s) Oral every 8 hours  melatonin 3 milliGRAM(s) Oral at bedtime  senna 2 Tablet(s) Oral at bedtime  spironolactone 25 milliGRAM(s) Oral daily      PHYSICAL EXAM:  T(C): 36.3 (05-16-19 @ 04:00), Max: 36.7 (05-15-19 @ 19:58)  HR: 60 (05-16-19 @ 04:00) (60 - 60)  BP: 123/63 (05-16-19 @ 04:00) (123/63 - 137/60)  RR: 18 (05-16-19 @ 04:00) (18 - 18)  SpO2: 91% (05-16-19 @ 07:00) (91% - 100%)  Wt(kg): --  I&O's Summary    15 May 2019 07:01  -  16 May 2019 07:00  --------------------------------------------------------  IN: 240 mL / OUT: 1750 mL / NET: -1510 mL    16 May 2019 07:01  -  16 May 2019 08:43  --------------------------------------------------------  IN: 0 mL / OUT: 350 mL / NET: -350 mL          Appearance: Normal awake alert	  HEENT:   Normal oral mucosa, PERRL, EOMI	  Cardiovascular: Normal S1 S2, No JVD, 2/6 murmur to axilla  Respiratory: Lungs clear to auscultation, normal effort 	  Gastrointestinal:  Soft, Non-tender, + BS	  Skin: No rashes, No ecchymoses, No cyanosis, warm to touch  Musculoskeletal: Normal range of motion, normal strength  Psychiatry:  Mood & affect appropriate  Ext: 2+ pitting edema  Peripheral pulses palpable 2+ bilaterally      LABS:    CARDIAC MARKERS:                                10.3   6.6   )-----------( 203      ( 16 May 2019 07:24 )             33.2     05-16    148<H>  |  103  |  24<H>  ----------------------------<  102<H>  3.4<L>   |  31  |  1.53<H>    Ca    8.7      16 May 2019 07:24  Mg     2.1     05-15      proBNP:   Lipid Profile:   HgA1c:   TSH:           TELEMETRY: 	    ECG:  paced rhthm	      < from: Xray Chest 1 View AP/PA (05.12.19 @ 09:40) >  omparison: 5/11/2019    Findings: The heart size cannotbe adequately assessed on this single   view. The left IJ central venous catheter with its tip overlying the   superior vena cava. There is loculated fluid in the right fissure which   is not significantly changed. There is mild pulmonary edema. There is a   probable small left pleural effusion.    Impression: Unchanged loculated fluid in the right fissure. Mild   pulmonary edema. Small left pleural effusion.

## 2019-05-16 NOTE — PROGRESS NOTE ADULT - ASSESSMENT
68 year old Non-Hodgkin's Lymphoma tx with chemotherapy in 2004, DM2 with CKD stage 3, HTN, CHF EF 35% on cardiac cath 12/ 2016, pleural effusion s/p left pleuracentesis in 10/2016, cardiomyopathy, gout and HLD presents to the ED sent in from cardiologists office because of AMS    # Respi failure/apnea/hypoxia-  sp Extubated  CxR-unchanged rt loculated small effusion, mild interstitial edema    # CHB sp micra ppm implant 5/10    #Acute encephalopathy. ?etiology  r/o acute delirium on vascular dementia  less likely infectious etiology,   -chronic parietal infarct in CT head   MR brain reviewed  , neuro cs fu  vitb12, folate, iron panel, rpr and tsh levels wnl   will consider LP r/o leptomeningeal involvement however will check CT c/a/P first   psych cs noted- depakote    # Fevers- afebrile  UA neg, CXR neg, bld cx ngtd  observe off abx       # acute on chronic  systolic heart failure.    - TTE EF 45%, likely severe MR, moderately enlarged RV , severe pulmonary hypertension. Estimated PASP = 75 mmHg.  - Medication non compliance  - titrate  furosemide w/ I+Os given signs of volume overload.   CT Sx for f/u   cont medical management       # Non-Hodgkin lymphoma of lymph nodes of neck, unspecified non-Hodgkin lymphoma type.  Plan: - treated with chemotherapy in 2014.   Anemia-monitor h/h  discussed [facility] : will check CT c/a/p    # Essential hypertension.  Plan: - bp stable  - c/w arb and beta blocker.     # Type 2 diabetes mellitus with chronic kidney disease, without long-term current use of insulin, unspecified CKD stage. Plan: - fs achs  - fs controlled  - start sliding scale insulin  - h    # Chronic gout without tophus, unspecified cause, unspecified site.   - c/w allopurinol 300 mg daily.

## 2019-05-16 NOTE — PROGRESS NOTE ADULT - SUBJECTIVE AND OBJECTIVE BOX
Patient is a 68y old  Male who presents with a chief complaint of confusion (08 May 2019 12:11)      HPI:  ** Patient poor historian **    Patient is a 68 year old Non-Hodgkin's Lymphoma tx with chemotherapy in , DM2 with CKD stage 3, HTN, CHF EF 35% on cardiac cath 2016, pleural effusion s/p left pleuracentesis in 10/2016, cardiomyopathy, gout and HLD presents to the ED sent in from cardiologists office because of EKG changes. Patient is not sure why he is here. He was not sure where he was, why he is here or what even the year is. He remarks that he retired a few months ago and he stopped taking his medications then because he "wasn't feeling good". He states he was wife his wife earlier but then later he states shes in [place]. Patient currently denies chest pain, shortness of breath, palpitations, syncope, fevers, chills, recent travel or sick contacts. No new meds however he has stopped taking most of his meds. He overall, feels fine and is not sure why he is in the hospital.     It's very difficult to get a clear history from patient. I have tried to reach his daughter but have not received a call back as of yet. I have called [facility] patients pharmacy and he states patient picked up furosemide and irbersartan in April but has not picked up any other meds in months. Mentation in  AAOX3.    In the ED, VSS. Labs at baseline, CT head with old stroke, Trops elevated and peaked at 50, now normal, EKG with TWI in lateral leads. (08 May 2019 10:33)    5 as the above record indicates, the pt is here and is a very poor historian. I was asked by Dr Hawkins from oncology and by the pt's internist to see the pt as he did have a past of lymphoma. The oncology office will have to look up records as his history is not readily available. When I came to see the pt he was not able to supply much info about his cancer other than he had lymphoma and did not know the type or who treated him. Dr Hawkins thinks he may have been treated by his previous associate, Dr Sarmiento.     At the time of my visit the pt was alert and oriented but was not able to give specific details about any of his medical issues. His chemistries appeared all unremarkable and he was not febrile and his ct of the head showed nothing of note.  I will see if there are any records on his previous onc history and see if there is any reason to think it may have contributed to his present admit here.  events of last day was noted and pt was intubated and had heart failure. The records were reviewed at [facility] and pt in  had a large cell lymphoma and was treated with R CHOP. In  he went to [facility] and was treated for a recurrence with BR and he has remained in remission. 5/10 still intubated and sedated and labs reviewed and thus far no direct evidence for lymphoma recurrence.  Pt still intubated and sedated. Anemia from icu anemia causes no evidence for recurrent lymphoma.  the pt was extubated on this date and hemoglobin was noted to be 11.0 will look when stable for any evidenced of recurrent lymphoma.  notes reviewed and pt still looked somewhat disoriented will check him for nodes in the am counts ok.  stable but confused ands seen by neuro who feels confusion is from the cardiac problems. Pt to be evaluated for MV repair. 5/15 very poor memory and cognitive decline in this pt as he could not remember 4 objects one minute later. He has good right sided mental abilities and good spacial ability. We do not know how long or fast this cognitive decline has been in play. Will have to try to reach out to family members, he cannot recall being treated for his lymphoma. Ct head without contrast. Renal function is poor, so that we are limited with ct of the brain and body without contrast. ? multiinfarct dementia neuro cognitive studies check B12 and tsh levels.  pt is stable and neuro evaluation proceeding with scans of the head.  MEDICATIONS  (STANDING):  allopurinol 300 milliGRAM(s) Oral daily  aspirin  chewable 81 milliGRAM(s) Oral daily  carvedilol 12.5 milliGRAM(s) Oral every 12 hours  chlorhexidine 4% Liquid 1 Application(s) Topical <User Schedule>  dextrose 5%. 1000 milliLiter(s) (50 mL/Hr) IV Continuous <Continuous>  dextrose 50% Injectable 12.5 Gram(s) IV Push once  docusate sodium 100 milliGRAM(s) Oral three times a day  folic acid 1 milliGRAM(s) Oral daily  heparin  Injectable 5000 Unit(s) SubCutaneous every 8 hours  hydrALAZINE 100 milliGRAM(s) Oral every 8 hours  insulin lispro (HumaLOG) corrective regimen sliding scale   SubCutaneous three times a day before meals  isosorbide   dinitrate Tablet (ISORDIL) 40 milliGRAM(s) Oral every 8 hours  nitroglycerin  Infusion 16.667 MICROgram(s)/Min (5 mL/Hr) IV Continuous <Continuous>  piperacillin/tazobactam IVPB. 3.375 Gram(s) IV Intermittent every 8 hours  senna 2 Tablet(s) Oral at bedtime  spironolactone 25 milliGRAM(s) Oral daily                        10.3   6.6   )-----------( 203      ( 16 May 2019 07:24 )             33.2     138  |  98  |  16  ----------------------------<  121<H>  3.5   |  28  |  1.42<H>    Ca    8.6      12 May 2019 05:10  Phos  3.6     05-12  Mg     2.2     05-12    TPro  6.5  /  Alb  3.0<L>  /  TBili  1.6<H>  /  DBili  x   /  AST  12  /  ALT  9<L>  /  AlkPhos  108  05-12    Urinalysis Basic - ( 08 May 2019 00:17 )    Color: Yellow / Appearance: Slightly Turbid / S.023 / pH: x  Gluc: x / Ketone: Negative  / Bili: Small / Urobili: 3 mg/dL   Blood: x / Protein: 300 mg/dL / Nitrite: Negative   Leuk Esterase: Negative / RBC: 2 /hpf / WBC 7 /HPF   Sq Epi: x / Non Sq Epi: 1 /hpf / Bacteria: Negative        ROS:  Negative except for:    PAST MEDICAL & SURGICAL HISTORY:  Pleural effusion  Moderate mitral regurgitation  Systolic heart failure  Rhinitis, allergic  Essential hypertension  DM type 2 (diabetes mellitus, type 2)  Non-Hodgkins Lymphoma  Non-Hodgkin lymphoma: h/o axillary dissection      SOCIAL HISTORY:    FAMILY HISTORY:  Family history of non-Hodgkin's lymphoma (Sibling)  Family history of CHF (congestive heart failure)      Allergies    No Known Allergies    Intolerances        PHYSICAL EXAM  General: lying in the bed and was being cared for by the staff and appeared still to be somewhat confused.  HEENT: stable   Neck: supple  CV: rr   Lungs: decreased breath sounds at the bases  Abdomen: soft non-tender non-distended, no hepatosplenomegaly  Ext: no clubbing cyanosis or edema  Skin: no rashes and no petechiae  Neuro: alert and oriented X3 no focal deficits    BLOOD SMEAR INTERPRETATION:    RADIOLOGY :

## 2019-05-16 NOTE — PROGRESS NOTE ADULT - ASSESSMENT
69M w/ PMHx  NHL s/p R-CHOP 2004, CHF/CMP (chemo related?) presenting with confusion and complete heart block.   Now s/p extubation, placement of pacemaker, and now being considered for MV repair(MitraClip vs MVR).   Remains confused.   no focal infectious etiology isolated. 69M w/ PMHx  NHL s/p R-CHOP 2004, CHF/CMP (chemo related?) presenting with confusion and complete heart block.   Now s/p extubation, placement of pacemaker, and now being considered for MV repair(MitraClip vs MVR).   Remains confused.   no focal infectious etiology isolated.-- cont monitor off antibx

## 2019-05-16 NOTE — PROGRESS NOTE ADULT - SUBJECTIVE AND OBJECTIVE BOX
Patient is a 69y old  Male who presents with a chief complaint of confusion (16 May 2019 11:53)                                                               INTERVAL HPI/OVERNIGHT EVENTS:    REVIEW OF SYSTEMS:     CONSTITUTIONAL: No weakness, fevers or chills  EYES/ENT: No visual changes , no ear ache   NECK: No pain or stiffness  RESPIRATORY: No cough, wheezing,  No shortness of breath  CARDIOVASCULAR: No chest pain or palpitations  GASTROINTESTINAL: No abdominal pain  . No nausea, vomiting, or hematemesis; No diarrhea or constipation. No melena or hematochezia.  GENITOURINARY: No dysuria, frequency or hematuria  NEUROLOGICAL: No numbness or weakness  SKIN: No itching, burning, rashes, or lesions                                                                                                                                                                                                                                                                                 Medications:  MEDICATIONS  (STANDING):  allopurinol 300 milliGRAM(s) Oral daily  aspirin  chewable 81 milliGRAM(s) Oral daily  carvedilol 12.5 milliGRAM(s) Oral every 12 hours  dextrose 5%. 1000 milliLiter(s) (50 mL/Hr) IV Continuous <Continuous>  dextrose 50% Injectable 12.5 Gram(s) IV Push once  diVALproex  milliGRAM(s) Oral two times a day  docusate sodium 100 milliGRAM(s) Oral three times a day  folic acid 1 milliGRAM(s) Oral daily  furosemide   Injectable 80 milliGRAM(s) IV Push two times a day  heparin  Injectable 5000 Unit(s) SubCutaneous every 8 hours  hydrALAZINE 100 milliGRAM(s) Oral every 8 hours  insulin lispro (HumaLOG) corrective regimen sliding scale   SubCutaneous three times a day before meals  isosorbide   dinitrate Tablet (ISORDIL) 40 milliGRAM(s) Oral every 8 hours  melatonin 3 milliGRAM(s) Oral at bedtime  metolazone 5 milliGRAM(s) Oral daily  senna 2 Tablet(s) Oral at bedtime  spironolactone 25 milliGRAM(s) Oral daily    MEDICATIONS  (PRN):  ALBUTerol/ipratropium for Nebulization 3 milliLiter(s) Nebulizer every 6 hours PRN Bronchospasm  dextrose 40% Gel 15 Gram(s) Oral once PRN Blood Glucose LESS THAN 70 milliGRAM(s)/deciliter  glucagon  Injectable 1 milliGRAM(s) IntraMuscular once PRN Glucose LESS THAN 70 milligrams/deciliter  sodium chloride 0.9% lock flush 10 milliLiter(s) IV Push every 1 hour PRN Pre/post blood products, medications, blood draw, and to maintain line patency  valproate sodium IVPB 125 milliGRAM(s) IV Intermittent every 8 hours PRN agitation       Allergies    No Known Allergies    Intolerances      Vital Signs Last 24 Hrs  T(C): 36.4 (16 May 2019 18:41), Max: 36.4 (16 May 2019 12:28)  T(F): 97.5 (16 May 2019 18:41), Max: 97.5 (16 May 2019 12:28)  HR: 60 (16 May 2019 18:41) (59 - 60)  BP: 152/61 (16 May 2019 18:41) (118/57 - 152/61)  BP(mean): --  RR: 18 (16 May 2019 18:41) (18 - 18)  SpO2: 93% (16 May 2019 18:41) (91% - 100%)  CAPILLARY BLOOD GLUCOSE      POCT Blood Glucose.: 124 mg/dL (16 May 2019 21:31)  POCT Blood Glucose.: 110 mg/dL (16 May 2019 18:00)  POCT Blood Glucose.: 114 mg/dL (16 May 2019 13:33)  POCT Blood Glucose.: 108 mg/dL (16 May 2019 09:03)      05-15 @ 07:01 - 05-16 @ 07:00  --------------------------------------------------------  IN: 240 mL / OUT: 1750 mL / NET: -1510 mL    05-16 @ 07:01 - 05-16 @ 21:51  --------------------------------------------------------  IN: 820 mL / OUT: 600 mL / NET: 220 mL      Physical Exam:    Daily     Daily   General:  Well appearing, NAD, not cachetic  HEENT:  Nonicteric, PERRLA  CV:  RRR, S1S2   Lungs:  CTA B/L, no wheezes, rales, rhonchi  Abdomen:  Soft, non-tender, no distended, positive BS  Extremities:  2+ pulses, no c/c, no edema  Skin:  Warm and dry, no rashes  :  No mittal  Neuro:  AAOx3, non-focal, grossly intact                                                                                                                                                                                                                                                                                                LABS:                               10.3   6.6   )-----------( 203      ( 16 May 2019 07:24 )             33.2                      05-16    148<H>  |  103  |  24<H>  ----------------------------<  102<H>  3.4<L>   |  31  |  1.53<H>    Ca    8.7      16 May 2019 07:24  Mg     2.1     05-15                         RADIOLOGY & ADDITIONAL TESTS         I personally reviewed: [  ]EKG   [  ]CXR    [  ] CT      A/P:         Discussed with :     [facility]uddy consultants' Notes   Time spent :

## 2019-05-16 NOTE — PROGRESS NOTE ADULT - ASSESSMENT
Mr. Skaggs is a 69 year old man with prior NHL (treated with R-CHOP 2004 and BR in 2010), chronic HFrEF (likely 2/2 Doxorubicin, EF:45%, decreased RVSF) who was sent to the ED on 5/8 with AMS and heart block - he progressed to complete heart block with encephalopathy requiring intubation and a leadless pacemaker.  His hospital course has included a KUSHAL revealing severe MR 2/2 a dilated MV annulus, severe pHTN (likely post-capillary 2/2 MR). HF asked to follow during the work up for a MitraClip.  He remains hypervolemic and is tolerating afterload reducing agents.  I suspect that the patient has dementia for month that his daughter may not have appreciated fully.

## 2019-05-16 NOTE — PROGRESS NOTE ADULT - ASSESSMENT
5/8 as the above record indicates, the pt is here and is a very poor historian. I was asked by Dr Hawkins from oncology and by the pt's internist to see the pt as he did have a past of lymphoma. The oncology office will have to look up records as his history is not readily available. When I came to see the pt he was not able to supply much info about his cancer other than he had lymphoma and did not know the type or who treated him. Dr Hawkins thinks he may have been treated by his previous associate, Dr Sarmiento.     At the time of my visit the pt was alert and oriented but was not able to give specific details about any of his medical issues. His chemistries appeared all unremarkable and he was not febrile and his ct of the head showed nothing of note.  I will see if there are any records on his previous onc history and see if there is any reason to think it may have contributed to his present admit here.     5/9 events of last day was noted and pt was intubated and had heart failure. 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 In 2010 he went to [facility] and was treated for a recurrence with BR and he has remained in remission.   5/10 still intubated and sedated and labs reviewed and thus far no direct evidence for lymphoma recurrence.  . 5/11 Pt still intubated and sedated. Anemia from icu anemia causes no evidence for recurrent lymphoma.   5/12 the pt was extubated on this date and hemoglobin was noted to be 11.0 will look when stable for any evidenced of recurrent lymphoma.   5/13 notes reviewed and pt still looked somewhat disoriented will check him for nodes in the am counts ok.  . 5/14 stable but confused ands seen by neuro who feels confusion is from the cardiac problems. Pt to be evaluated for MV repair.   5/15 very poor memory and cognitive decline in this pt as he could not remember 4 objects one minute later. He has good right sided mental abilities and good spacial ability. We do not know how long or fast this cognitive decline has been in play. Will have to try to reach out to family members, he cannot recall being treated for his lymphoma. Ct head without contrast. Renal function is poor, so that we are limited with ct of the brain and body without contrast. ? multiinfarct dementia neuro cognitive studies check B12 and tsh levels. . 5/16 pt is stable and neuro evaluation proceeding with scans of the head.

## 2019-05-16 NOTE — PROGRESS NOTE ADULT - SUBJECTIVE AND OBJECTIVE BOX
Patient seen and examined at bedside on 3 DSU, no family at bedside, unable to reach family by phone this morning    Overnight Events: No events overnight, CT Chest/Abd/Pelvis ordered by Medicine to look for recurrence of lymphoma    Review Of Systems: Unable to obtain as the patient is AOx1 (name)            Current Meds:  ALBUTerol/ipratropium for Nebulization 3 milliLiter(s) Nebulizer every 6 hours PRN  allopurinol 300 milliGRAM(s) Oral daily  aspirin  chewable 81 milliGRAM(s) Oral daily  carvedilol 12.5 milliGRAM(s) Oral every 12 hours  diVALproex  milliGRAM(s) Oral two times a day  docusate sodium 100 milliGRAM(s) Oral three times a day  folic acid 1 milliGRAM(s) Oral daily  furosemide   Injectable 80 milliGRAM(s) IV Push two times a day  heparin  Injectable 5000 Unit(s) SubCutaneous every 8 hours  hydrALAZINE 100 milliGRAM(s) Oral every 8 hours  insulin lispro (HumaLOG) corrective regimen sliding scale   SubCutaneous three times a day before meals  isosorbide   dinitrate Tablet (ISORDIL) 40 milliGRAM(s) Oral every 8 hours  melatonin 3 milliGRAM(s) Oral at bedtime  senna 2 Tablet(s) Oral at bedtime  sodium chloride 0.9% lock flush 10 milliLiter(s) IV Push every 1 hour PRN  spironolactone 25 milliGRAM(s) Oral daily  valproate sodium IVPB 125 milliGRAM(s) IV Intermittent every 8 hours PRN    Vitals:  T(F): 97.4 (05-16), Max: 98 (05-15)  HR: 60 (05-16) (60 - 60)  BP: 123/63 (05-16) (123/63 - 137/60)  RR: 18 (05-16)  SpO2: 100% on 2L NC    I&O's Summary    15 May 2019 07:01  -  16 May 2019 07:00  --------------------------------------------------------  IN: 240 mL / OUT: 1750 mL / NET: -1510 mL    Physical Exam:  Appearance: No acute distress; well appearing, breathing comfortably on 2L NC, sitting in chair at bedside  Eyes: PERRL, EOMI, pink conjunctiva  HEENT: Normal oral mucosa  Cardiovascular: RRR, S1, S2, 2/6 MR murmur, no rubs, or gallops; + b/l LE edema; JVP not elevated (pt was sitting at 90 degrees)  Respiratory: Clear to auscultation bilaterally, no rales  Gastrointestinal: soft, non-tender, non-distended with normal bowel sounds  Musculoskeletal: No clubbing; no joint deformity   Neurologic: Non-focal  Lymphatic: No lymphadenopathy  Psychiatry: AAOx1 (name), pleasant  Skin: No rashes, ecchymoses, or cyanosis    AM labs pending    Interpretation of Telemetry: sinus w/ CHB and V pacing in the 60s Patient seen and examined at bedside on 3 DSU, no family at bedside    I spoke with the patient's daughter this morning - the patient retired 1.5 years ago and has been living alone - he is able to care for himself. His daughter last spoke with him 6 weeks prior to his admission and felt that he was not confused. She also did not perceive that he was confused yesterday when she saw him in the hospital. The patient is still driving and drove himself to this hospital. He is  from his wife who is currently living in [place].    Overnight Events: No events overnight, CT Chest/Abd/Pelvis ordered by Medicine to look for recurrence of lymphoma    Review Of Systems: Unable to obtain as the patient is AOx1 (name)            Current Meds:  ALBUTerol/ipratropium for Nebulization 3 milliLiter(s) Nebulizer every 6 hours PRN  allopurinol 300 milliGRAM(s) Oral daily  aspirin  chewable 81 milliGRAM(s) Oral daily  carvedilol 12.5 milliGRAM(s) Oral every 12 hours  diVALproex  milliGRAM(s) Oral two times a day  docusate sodium 100 milliGRAM(s) Oral three times a day  folic acid 1 milliGRAM(s) Oral daily  furosemide   Injectable 80 milliGRAM(s) IV Push two times a day  heparin  Injectable 5000 Unit(s) SubCutaneous every 8 hours  hydrALAZINE 100 milliGRAM(s) Oral every 8 hours  insulin lispro (HumaLOG) corrective regimen sliding scale   SubCutaneous three times a day before meals  isosorbide   dinitrate Tablet (ISORDIL) 40 milliGRAM(s) Oral every 8 hours  melatonin 3 milliGRAM(s) Oral at bedtime  senna 2 Tablet(s) Oral at bedtime  sodium chloride 0.9% lock flush 10 milliLiter(s) IV Push every 1 hour PRN  spironolactone 25 milliGRAM(s) Oral daily  valproate sodium IVPB 125 milliGRAM(s) IV Intermittent every 8 hours PRN    Vitals:  T(F): 97.4 (05-16), Max: 98 (05-15)  HR: 60 (05-16) (60 - 60)  BP: 123/63 (05-16) (123/63 - 137/60)  RR: 18 (05-16)  SpO2: 100% on 2L NC    I&O's Summary    15 May 2019 07:01  -  16 May 2019 07:00  --------------------------------------------------------  IN: 240 mL / OUT: 1750 mL / NET: -1510 mL    Physical Exam:  Appearance: No acute distress; well appearing, breathing comfortably on 2L NC, sitting in chair at bedside  Eyes: PERRL, EOMI, pink conjunctiva  HEENT: Normal oral mucosa  Cardiovascular: RRR, S1, S2, 2/6 MR murmur, no rubs, or gallops; + b/l LE edema; JVP mildly elevated  Respiratory: Clear to auscultation bilaterally, no rales  Gastrointestinal: soft, non-tender, non-distended with normal bowel sounds  Musculoskeletal: No clubbing; no joint deformity   Neurologic: Non-focal  Lymphatic: No lymphadenopathy  Psychiatry: AAOx1 (name), pleasant  Skin: No rashes, ecchymoses, or cyanosis    AM labs pending - update - labs reviewed: Cr:1.53, K:3.4    Interpretation of Telemetry: sinus w/ CHB and V pacing in the 60s

## 2019-05-16 NOTE — CHART NOTE - NSCHARTNOTEFT_GEN_A_CORE
Nutrition Follow Up Note  Patient seen for: follow up      68 year old Non-Hodgkin's Lymphoma tx with chemotherapy in , DM2 with CKD stage 3, HTN, CHF EF 35% on cardiac cath 2016, pleural effusion s/p left pleuracentesis in 10/2016, cardiomyopathy, gout and HLD presents to the ED sent in from cardiologists office because of AMS        · Reason for Admission	confusion      Source: pt, chart    Diet : Soft  Danactive x 2 daily    Patient reports: was not familiar with reason why he was receiving Danactive even though he had been educated on it in the past. he believes that he is receiving Soft diet due to his GI issues.      PO intake :>75%      Source for PO intake: pt          Daily Weight in k (05-15), Weight in k (05-10)  % Weight Change: 10% loss since     Pertinent Medications: MEDICATIONS  (STANDING):  allopurinol 300 milliGRAM(s) Oral daily  aspirin  chewable 81 milliGRAM(s) Oral daily  carvedilol 12.5 milliGRAM(s) Oral every 12 hours  dextrose 5%. 1000 milliLiter(s) (50 mL/Hr) IV Continuous <Continuous>  dextrose 50% Injectable 12.5 Gram(s) IV Push once  diVALproex  milliGRAM(s) Oral two times a day  docusate sodium 100 milliGRAM(s) Oral three times a day  folic acid 1 milliGRAM(s) Oral daily  furosemide   Injectable 80 milliGRAM(s) IV Push two times a day  heparin  Injectable 5000 Unit(s) SubCutaneous every 8 hours  hydrALAZINE 100 milliGRAM(s) Oral every 8 hours  insulin lispro (HumaLOG) corrective regimen sliding scale   SubCutaneous three times a day before meals  isosorbide   dinitrate Tablet (ISORDIL) 40 milliGRAM(s) Oral every 8 hours  melatonin 3 milliGRAM(s) Oral at bedtime  senna 2 Tablet(s) Oral at bedtime  spironolactone 25 milliGRAM(s) Oral daily    MEDICATIONS  (PRN):  ALBUTerol/ipratropium for Nebulization 3 milliLiter(s) Nebulizer every 6 hours PRN Bronchospasm  dextrose 40% Gel 15 Gram(s) Oral once PRN Blood Glucose LESS THAN 70 milliGRAM(s)/deciliter  glucagon  Injectable 1 milliGRAM(s) IntraMuscular once PRN Glucose LESS THAN 70 milligrams/deciliter  sodium chloride 0.9% lock flush 10 milliLiter(s) IV Push every 1 hour PRN Pre/post blood products, medications, blood draw, and to maintain line patency  valproate sodium IVPB 125 milliGRAM(s) IV Intermittent every 8 hours PRN agitation    Pertinent Labs:  @ 07:24: Na 148<H>, BUN 24<H>, Cr 1.53<H>, <H>, K+ 3.4<L>, HgbA1c 6.0    Finger Sticks:  POCT Blood Glucose.: 108 mg/dL ( @ 09:03)  POCT Blood Glucose.: 111 mg/dL (05-15 @ 17:44)  POCT Blood Glucose.: 130 mg/dL (05-15 @ 13:00)      Skin per nursing documentation:   Edema: +2 right, left leg, right, left ankle, right, left  foot    Estimated Needs:   [x ] no change since previous assessment  [ ] recalculated:        Interventions: encouraged pt to finish filling out menu, encouraged pt to consume Danactive immediately upon being received.     Recommend  1)change diet to consistent carbohydrate, Soft diet, 60gram protein,dash  2)continue Danactive x 2 daily  3)monitor need for additional supplement    Monitoring and Evaluation:     Continue to monitor Nutritional intake, Tolerance to diet prescription, weights, labs, skin integrity    RD remains available upon request and will follow up per protocol  Mary Seaman MA, MICHA, CDN #[phone] Nutrition Follow Up Note  Patient seen for: follow up      68 year old Non-Hodgkin's Lymphoma tx with chemotherapy in , DM2 with CKD stage 3, HTN, CHF EF 35% on cardiac cath 2016, pleural effusion s/p left pleuracentesis in 10/2016, cardiomyopathy, gout and HLD presents to the ED sent in from cardiologists office because of AMS        · Reason for Admission	confusion      Source: pt, chart    Diet : Soft  Danactive x 2 daily    Patient reports: was not familiar with reason why he was receiving Danactive even though he had been educated on it in the past. he believes that he is receiving Soft diet due to his GI issues.      PO intake :>75%      Source for PO intake: pt          Daily Weight in k (05-15), Weight in k (05-10)  % Weight Change: 10% loss since -change in weight questionable since pt with good appetite and has been eating well    Pertinent Medications: MEDICATIONS  (STANDING):  allopurinol 300 milliGRAM(s) Oral daily  aspirin  chewable 81 milliGRAM(s) Oral daily  carvedilol 12.5 milliGRAM(s) Oral every 12 hours  dextrose 5%. 1000 milliLiter(s) (50 mL/Hr) IV Continuous <Continuous>  dextrose 50% Injectable 12.5 Gram(s) IV Push once  diVALproex  milliGRAM(s) Oral two times a day  docusate sodium 100 milliGRAM(s) Oral three times a day  folic acid 1 milliGRAM(s) Oral daily  furosemide   Injectable 80 milliGRAM(s) IV Push two times a day  heparin  Injectable 5000 Unit(s) SubCutaneous every 8 hours  hydrALAZINE 100 milliGRAM(s) Oral every 8 hours  insulin lispro (HumaLOG) corrective regimen sliding scale   SubCutaneous three times a day before meals  isosorbide   dinitrate Tablet (ISORDIL) 40 milliGRAM(s) Oral every 8 hours  melatonin 3 milliGRAM(s) Oral at bedtime  senna 2 Tablet(s) Oral at bedtime  spironolactone 25 milliGRAM(s) Oral daily    MEDICATIONS  (PRN):  ALBUTerol/ipratropium for Nebulization 3 milliLiter(s) Nebulizer every 6 hours PRN Bronchospasm  dextrose 40% Gel 15 Gram(s) Oral once PRN Blood Glucose LESS THAN 70 milliGRAM(s)/deciliter  glucagon  Injectable 1 milliGRAM(s) IntraMuscular once PRN Glucose LESS THAN 70 milligrams/deciliter  sodium chloride 0.9% lock flush 10 milliLiter(s) IV Push every 1 hour PRN Pre/post blood products, medications, blood draw, and to maintain line patency  valproate sodium IVPB 125 milliGRAM(s) IV Intermittent every 8 hours PRN agitation    Pertinent Labs:  @ 07:24: Na 148<H>, BUN 24<H>, Cr 1.53<H>, <H>, K+ 3.4<L>, HgbA1c 6.0    Finger Sticks:  POCT Blood Glucose.: 108 mg/dL ( @ 09:03)  POCT Blood Glucose.: 111 mg/dL (05-15 @ 17:44)  POCT Blood Glucose.: 130 mg/dL (05-15 @ 13:00)      Skin per nursing documentation:   Edema: +2 right, left leg, right, left ankle, right, left  foot    Estimated Needs:   [x ] no change since previous assessment  [ ] recalculated:        Interventions: encouraged pt to finish filling out menu, encouraged pt to consume Danactive immediately upon being received.     Recommend  1)change diet to consistent carbohydrate, Soft diet, 60gram protein,dash  2)continue Danactive x 2 daily  3)monitor need for additional supplement    Monitoring and Evaluation:     Continue to monitor Nutritional intake, Tolerance to diet prescription, weights, labs, skin integrity    RD remains available upon request and will follow up per protocol  Mary Seaman MA, MICHA, CDN #[phone]

## 2019-05-16 NOTE — PROGRESS NOTE ADULT - SUBJECTIVE AND OBJECTIVE BOX
[facility], Division of Infectious Diseases  DEONNA Enriquez A. Lee  [phone]    Name: NIK GRIMM  Age: 69y  Gender: Male  MRN: [id number]    Interval History--  Notes reviewed  sitting in chair  pleasant, oriented to person.    Past Medical History--  Pleural effusion  Moderate mitral regurgitation  Systolic heart failure  Rhinitis, allergic  Essential hypertension  DM type 2 (diabetes mellitus, type 2)  Diabetes Mellitus  Non-Hodgkins Lymphoma  Non-Hodgkin lymphoma      For details regarding the patient's social history, family history, and other miscellaneous elements, please refer the initial infectious diseases consultation and/or the admitting history and physical examination for this admission.    Allergies    No Known Allergies    Intolerances        Medications--  Antibiotics:    Immunologic:    Other:  ALBUTerol/ipratropium for Nebulization PRN  allopurinol  aspirin  chewable  carvedilol  dextrose 40% Gel PRN  dextrose 5%.  dextrose 50% Injectable  diVALproex DR  docusate sodium  folic acid  furosemide   Injectable  glucagon  Injectable PRN  heparin  Injectable  hydrALAZINE  insulin lispro (HumaLOG) corrective regimen sliding scale  isosorbide   dinitrate Tablet (ISORDIL)  melatonin  senna  sodium chloride 0.9% lock flush PRN  spironolactone  valproate sodium IVPB PRN      Review of Systems--  A 10-point review of systems was obtained.     unable to obtain     Review of systems otherwise negative except as previously noted.    Physical Examination--  Vital Signs: T(F): 97.4 (05-16-19 @ 04:00), Max: 98 (05-15-19 @ 19:58)  HR: 60 (05-16-19 @ 04:00)  BP: 123/63 (05-16-19 @ 04:00)  RR: 18 (05-16-19 @ 04:00)  SpO2: 91% (05-16-19 @ 07:00)  Wt(kg): --  General: Nontoxic-appearing Male in no acute distress.  HEENT: AT/NCAnicteric. Conjunctiva pink and moist  Neck: Not rigid. No sense of mass.  Nodes: None palpable.  Lungs: Clear bilaterally without rales, wheezing or rhonchi  Heart: Regular rate and rhythm. No Murmur. rill.  Abdomen: Bowel sounds present and normoactive. Soft. Nondistended. Nontender.  Back: No spinal tenderness. No costovertebral angle tenderness.   Extremities: No cyanosis or clubbing. No edema.   Skin: Warm. Dry. Good turgor. No rash. No vasculitic stigmata.  Psychiatric: pleasant        Laboratory Studies--  CBC                        10.3   6.6   )-----------( 203      ( 16 May 2019 07:24 )             33.2       Chemistries  05-16    148<H>  |  103  |  24<H>  ----------------------------<  102<H>  3.4<L>   |  31  |  1.53<H>    Ca    8.7      16 May 2019 07:24  Mg     2.1     05-15        Culture Data    Culture - Blood (collected 10 May 2019 02:10)  Source: .Blood  Final Report (15 May 2019 03:00):    No growth at 5 days.    Culture - Blood (collected 10 May 2019 02:10)  Source: .Blood  Final Report (15 May 2019 03:00):    No growth at 5 days.          < from: CT Chest No Cont (05.16.19 @ 09:05) >    EXAM:  CT ABDOMEN AND PELVIS                          EXAM:  CT CHEST                            PROCEDURE DATE:  05/16/2019            INTERPRETATION:  CLINICAL INFORMATION: Asymptomatic follow-up of   69-year-old male with history of lymphoma. Evaluate for recurrence.    COMPARISON: 11/27/2016    PROCEDURE:   CT of the Chest, Abdomen and Pelvis was performed without intravenous   contrast.   Intravenous contrast: None.  Oral contrast: None.  Sagittal and coronal reformats were performed.    FINDINGS:    CHEST:     LUNGS AND LARGE AIRWAYS: Patent central airways. Biapical pleural and   parenchymal scarring, right greater than left, with mild nodularity,   similar to findings present on prior exam. Calcified granuloma in the   right upper lobe. Bilateral upper lobe predominant paraseptal and   centrilobular emphysema.  PLEURA: Bilateral moderate pleural effusion with chronically loculated   pleural effusion in the right anterior chest.  VESSELS: Atherosclerotic calcifications.  HEART: Mild cardiomegaly. No pericardial effusion.  MEDIASTINUM AND AUDIE: Increase in size and number of calcified and   noncalcified mediastinal lymph nodes, overall stable to slightly improved   from prior examination 11/27/2016. For reference:  *  Prevascular lymph node (2:29) measuring 2.6 x 0.7 cm (previously 2.2 x   1.1 cm)  *  Precarinal lymph node (2:32) 1.5 x 1.4 cm (previously 1.6 x 1.6 cm)    CHEST WALL AND LOWER NECK: Within normal limits.    ABDOMEN AND PELVIS:    LIVER: The liver is enlarged. The right hepatic lobe measures 21.0 cm in   craniocaudal diameter.  BILE DUCTS: Normal caliber.  GALLBLADDER: Layering biliary sludge.  SPLEEN: Splenomegaly. Spleen measures 15.9 cm in craniocaudal diameter.  PANCREAS: Within normal limits.  ADRENALS: Within normal limits.  KIDNEYS/URETERS: Small renal calculi. No hydronephrosis.    BLADDER: Within normal limits.  REPRODUCTIVE ORGANS: Enlarged prostate.    BOWEL: No bowel obstruction. Appendix is normal. Diverticulosis.  PERITONEUM: Trace free fluid.  VESSELS:  Within normal limits.  RETROPERITONEUM:   *  Mildly enlarged portacaval lymph node (2:88) measuring 2.4 x 1.1 cm   (previously 3.4 x 1.4 cm).    ABDOMINAL WALL: Within normal limits.  BONES: Degenerative changes.    IMPRESSION:     * Mild mediastinal and retroperitoneal lymphadenopathy, likely slightly   improved compared to prior examination.  *  Hepatosplenomegaly.  *  Trace abdominal and pelvic ascites. Bilateral pleural effusions,   loculated in the right anterior chest.            < end of copied text >

## 2019-05-16 NOTE — PROGRESS NOTE ADULT - PROBLEM SELECTOR PLAN 3
-c/w current doses Isordil and Hydralazine  -c/w Coreg 12.5 bid  -c/w Aldactone 25  -Diuretics as above  -Strict I/Os, daily standing weights    HF will continue to follow    SUKHDEV Nascimento  Cardiology Fellow  [phone]

## 2019-05-16 NOTE — PROGRESS NOTE ADULT - PROBLEM SELECTOR PLAN 1
-c/w Hydralazine 100 tid and Isordil 40 tid  -c/w Lasix 80mg IVP bid  -Start Metolazone 5mg p.o. daily  -Structural recs - outpatient follow up

## 2019-05-17 LAB
ANION GAP SERPL CALC-SCNC: 13 MMOL/L — SIGNIFICANT CHANGE UP (ref 5–17)
BUN SERPL-MCNC: 28 MG/DL — HIGH (ref 7–23)
CALCIUM SERPL-MCNC: 9.3 MG/DL — SIGNIFICANT CHANGE UP (ref 8.4–10.5)
CHLORIDE SERPL-SCNC: 99 MMOL/L — SIGNIFICANT CHANGE UP (ref 96–108)
CO2 SERPL-SCNC: 30 MMOL/L — SIGNIFICANT CHANGE UP (ref 22–31)
CREAT SERPL-MCNC: 1.83 MG/DL — HIGH (ref 0.5–1.3)
GLUCOSE BLDC GLUCOMTR-MCNC: 109 MG/DL — HIGH (ref 70–99)
GLUCOSE BLDC GLUCOMTR-MCNC: 116 MG/DL — HIGH (ref 70–99)
GLUCOSE BLDC GLUCOMTR-MCNC: 166 MG/DL — HIGH (ref 70–99)
GLUCOSE BLDC GLUCOMTR-MCNC: 95 MG/DL — SIGNIFICANT CHANGE UP (ref 70–99)
GLUCOSE SERPL-MCNC: 90 MG/DL — SIGNIFICANT CHANGE UP (ref 70–99)
HCT VFR BLD CALC: 33.3 % — LOW (ref 39–50)
HGB BLD-MCNC: 10.4 G/DL — LOW (ref 13–17)
MCHC RBC-ENTMCNC: 27.8 PG — SIGNIFICANT CHANGE UP (ref 27–34)
MCHC RBC-ENTMCNC: 31.2 GM/DL — LOW (ref 32–36)
MCV RBC AUTO: 89 FL — SIGNIFICANT CHANGE UP (ref 80–100)
PLATELET # BLD AUTO: 225 K/UL — SIGNIFICANT CHANGE UP (ref 150–400)
POTASSIUM SERPL-MCNC: 3.5 MMOL/L — SIGNIFICANT CHANGE UP (ref 3.5–5.3)
POTASSIUM SERPL-SCNC: 3.5 MMOL/L — SIGNIFICANT CHANGE UP (ref 3.5–5.3)
RBC # BLD: 3.74 M/UL — LOW (ref 4.2–5.8)
RBC # FLD: 15.9 % — HIGH (ref 10.3–14.5)
SODIUM SERPL-SCNC: 142 MMOL/L — SIGNIFICANT CHANGE UP (ref 135–145)
TSH SERPL-MCNC: 3.07 UIU/ML — SIGNIFICANT CHANGE UP (ref 0.27–4.2)
VIT B12 SERPL-MCNC: 709 PG/ML — SIGNIFICANT CHANGE UP (ref 232–1245)
WBC # BLD: 8.3 K/UL — SIGNIFICANT CHANGE UP (ref 3.8–10.5)
WBC # FLD AUTO: 8.3 K/UL — SIGNIFICANT CHANGE UP (ref 3.8–10.5)

## 2019-05-17 PROCEDURE — 99233 SBSQ HOSP IP/OBS HIGH 50: CPT | Mod: GC

## 2019-05-17 RX ORDER — ACETAMINOPHEN 500 MG
650 TABLET ORAL ONCE
Refills: 0 | Status: COMPLETED | OUTPATIENT
Start: 2019-05-17 | End: 2019-05-17

## 2019-05-17 RX ADMIN — Medication 100 MILLIGRAM(S): at 21:03

## 2019-05-17 RX ADMIN — SPIRONOLACTONE 25 MILLIGRAM(S): 25 TABLET, FILM COATED ORAL at 06:12

## 2019-05-17 RX ADMIN — HEPARIN SODIUM 5000 UNIT(S): 5000 INJECTION INTRAVENOUS; SUBCUTANEOUS at 06:12

## 2019-05-17 RX ADMIN — HEPARIN SODIUM 5000 UNIT(S): 5000 INJECTION INTRAVENOUS; SUBCUTANEOUS at 13:52

## 2019-05-17 RX ADMIN — Medication 100 MILLIGRAM(S): at 13:52

## 2019-05-17 RX ADMIN — HEPARIN SODIUM 5000 UNIT(S): 5000 INJECTION INTRAVENOUS; SUBCUTANEOUS at 21:03

## 2019-05-17 RX ADMIN — Medication 300 MILLIGRAM(S): at 10:13

## 2019-05-17 RX ADMIN — Medication 80 MILLIGRAM(S): at 06:14

## 2019-05-17 RX ADMIN — Medication 650 MILLIGRAM(S): at 01:15

## 2019-05-17 RX ADMIN — Medication 1 MILLIGRAM(S): at 10:14

## 2019-05-17 RX ADMIN — Medication 650 MILLIGRAM(S): at 00:46

## 2019-05-17 RX ADMIN — DIVALPROEX SODIUM 250 MILLIGRAM(S): 500 TABLET, DELAYED RELEASE ORAL at 06:13

## 2019-05-17 RX ADMIN — Medication 81 MILLIGRAM(S): at 10:14

## 2019-05-17 RX ADMIN — DIVALPROEX SODIUM 250 MILLIGRAM(S): 500 TABLET, DELAYED RELEASE ORAL at 18:05

## 2019-05-17 RX ADMIN — Medication 100 MILLIGRAM(S): at 06:12

## 2019-05-17 RX ADMIN — CARVEDILOL PHOSPHATE 12.5 MILLIGRAM(S): 80 CAPSULE, EXTENDED RELEASE ORAL at 21:03

## 2019-05-17 RX ADMIN — ISOSORBIDE DINITRATE 40 MILLIGRAM(S): 5 TABLET ORAL at 13:52

## 2019-05-17 RX ADMIN — CARVEDILOL PHOSPHATE 12.5 MILLIGRAM(S): 80 CAPSULE, EXTENDED RELEASE ORAL at 10:14

## 2019-05-17 RX ADMIN — ISOSORBIDE DINITRATE 40 MILLIGRAM(S): 5 TABLET ORAL at 21:03

## 2019-05-17 RX ADMIN — Medication 80 MILLIGRAM(S): at 18:05

## 2019-05-17 RX ADMIN — Medication 100 MILLIGRAM(S): at 06:13

## 2019-05-17 RX ADMIN — SENNA PLUS 2 TABLET(S): 8.6 TABLET ORAL at 21:03

## 2019-05-17 RX ADMIN — Medication 3 MILLIGRAM(S): at 21:03

## 2019-05-17 RX ADMIN — ISOSORBIDE DINITRATE 40 MILLIGRAM(S): 5 TABLET ORAL at 06:12

## 2019-05-17 NOTE — PROGRESS NOTE ADULT - PROBLEM SELECTOR PROBLEM 3
Acute on chronic systolic CHF (congestive heart failure), NYHA class 3 Dementia without behavioral disturbance, unspecified dementia type

## 2019-05-17 NOTE — PROGRESS NOTE ADULT - PROBLEM SELECTOR PLAN 3
-c/w current doses Isordil and Hydralazine  -c/w Coreg 12.5 bid  -c/w Aldactone 25  -Diuretics as above  -Strict I/Os, daily standing weights    HF will continue to follow    SUKHDEV Nascimento  Cardiology Fellow  [phone] -I suspect the patient's current MS is his baseline - would defer to Neuro    HF will continue to follow    SUKHDEV Nascimento  Cardiology Fellow  [phone]

## 2019-05-17 NOTE — PROGRESS NOTE ADULT - ASSESSMENT
68 year old Non-Hodgkin's Lymphoma tx with chemotherapy in 2004, DM2 with CKD stage 3, HTN, CHF EF 35% on cardiac cath 12/ 2016, pleural effusion s/p left pleuracentesis in 10/2016, cardiomyopathy, gout and HLD presents to the ED sent in from cardiologists office because of AMS    # Respi failure/apnea/hypoxia-  sp Extubated  CxR-unchanged rt loculated small effusion, mild interstitial edema    # CHB sp micra ppm implant 5/10    #Acute encephalopathy. ?etiology  r/o acute delirium on vascular dementia  less likely infectious etiology,   -chronic parietal infarct in CT head   MR brain reviewed  , neuro cs fu  vitb12, folate, iron panel, rpr and tsh levels wnl   will consider LP r/o leptomeningeal involvement however will check CT c/a/P first   psych cs noted- depakote    # Fevers- afebrile  UA neg, CXR neg, bld cx ngtd  observe off abx       # acute on chronic  systolic heart failure.    - TTE EF 45%, likely severe MR, moderately enlarged RV , severe pulmonary hypertension. Estimated PASP = 75 mmHg.  - Medication non compliance  - titrate  furosemide w/ I+Os given signs of volume overload.   CT Sx for f/u   cont medical management       # Non-Hodgkin lymphoma of lymph nodes of neck, unspecified non-Hodgkin lymphoma type.  Plan: - treated with chemotherapy in 2014.   Anemia-monitor h/h  discussed [facility] Onc : will check CT c/a/p    # Essential hypertension.  Plan: - bp stable  - c/w arb and beta blocker.     # Type 2 diabetes mellitus with chronic kidney disease, without long-term current use of insulin, unspecified CKD stage. Plan: - fs achs  - fs controlled  - start sliding scale insulin  - h    # Chronic gout without tophus, unspecified cause, unspecified site.   - c/w allopurinol 300 mg daily.

## 2019-05-17 NOTE — PROGRESS NOTE ADULT - ATTENDING COMMENTS
Thank you for the courtesy of this referral.    I'll sign off at this time.     Jd Proctor MD  [phone]

## 2019-05-17 NOTE — PROGRESS NOTE ADULT - SUBJECTIVE AND OBJECTIVE BOX
Patient seen and examined at bedside on 3 DSU    Overnight Events: The patient was given IV Valproic Acid for agitation    Review Of Systems: Unable to obtain as the patient is AOx1           Current Meds:  ALBUTerol/ipratropium for Nebulization 3 milliLiter(s) Nebulizer every 6 hours PRN  allopurinol 300 milliGRAM(s) Oral daily  aspirin  chewable 81 milliGRAM(s) Oral daily  carvedilol 12.5 milliGRAM(s) Oral every 12 hours  diVALproex  milliGRAM(s) Oral two times a day  docusate sodium 100 milliGRAM(s) Oral three times a day  folic acid 1 milliGRAM(s) Oral daily  furosemide   Injectable 80 milliGRAM(s) IV Push two times a day  heparin  Injectable 5000 Unit(s) SubCutaneous every 8 hours  hydrALAZINE 100 milliGRAM(s) Oral every 8 hours  insulin lispro (HumaLOG) corrective regimen sliding scale   SubCutaneous three times a day before meals  isosorbide   dinitrate Tablet (ISORDIL) 40 milliGRAM(s) Oral every 8 hours  melatonin 3 milliGRAM(s) Oral at bedtime  metolazone 5 milliGRAM(s) Oral daily  senna 2 Tablet(s) Oral at bedtime  sodium chloride 0.9% lock flush 10 milliLiter(s) IV Push every 1 hour PRN  spironolactone 25 milliGRAM(s) Oral daily  valproate sodium IVPB 125 milliGRAM(s) IV Intermittent every 8 hours PRN    Vitals:  T(F): 98 (05-17), Max: 98.3 (05-16)  HR: 60 (05-17) (59 - 60)  BP: 121/63 (05-17) (118/57 - 152/61)  RR: 18 (05-17)  SpO2: 97% on RA    I&O's Summary    16 May 2019 07:01  -  17 May 2019 07:00  --------------------------------------------------------  IN: 820 mL / OUT: 950 mL / NET: -130 mL    Weight: 84.3 (standing) <-- 88kg (bed from 5/15, no prior standing weights)    Physical Exam:  Appearance: No acute distress; well appearing, breathing comfortably, sitting in chair at bedside,  Eyes: PERRL, EOMI, pink conjunctiva  HEENT: Normal oral mucosa  Cardiovascular: RRR, S1, S2, 2/6 MR murmur, no rubs, or gallops; + b/l LE edema; JVP not elevated with the patient sitting upright  Respiratory: Clear to auscultation bilaterally, no rales  Gastrointestinal: soft, non-tender, non-distended with normal bowel sounds  Musculoskeletal: No clubbing; no joint deformity   Neurologic: Non-focal  Lymphatic: No lymphadenopathy  Psychiatry: AAOx1 (name), pleasant, not agitated  Skin: No rashes, ecchymoses, or cyanosis    AM labs pending    Imaging: < from: CT Chest + Abd + Pelvis No Cont (05.16.19 @ 09:05) > IMPRESSION:   * Mild mediastinal and retroperitoneal lymphadenopathy, likely slightly  improved compared to prior examination. *  Hepatosplenomegaly. *  Trace abdominal and pelvic ascites. Bilateral pleural effusions,  loculated in the right anterior chest.  < end of copied text >     Interpretation of Telemetry: sinus w/ CHB and V pacing @ 60 Patient seen and examined at bedside on 3 DSU    Overnight Events: The patient was given IV Valproic Acid for agitation    Review Of Systems: Unable to obtain as the patient is AOx1           Current Meds:  ALBUTerol/ipratropium for Nebulization 3 milliLiter(s) Nebulizer every 6 hours PRN  allopurinol 300 milliGRAM(s) Oral daily  aspirin  chewable 81 milliGRAM(s) Oral daily  carvedilol 12.5 milliGRAM(s) Oral every 12 hours  diVALproex  milliGRAM(s) Oral two times a day  docusate sodium 100 milliGRAM(s) Oral three times a day  folic acid 1 milliGRAM(s) Oral daily  furosemide   Injectable 80 milliGRAM(s) IV Push two times a day  heparin  Injectable 5000 Unit(s) SubCutaneous every 8 hours  hydrALAZINE 100 milliGRAM(s) Oral every 8 hours  insulin lispro (HumaLOG) corrective regimen sliding scale   SubCutaneous three times a day before meals  isosorbide   dinitrate Tablet (ISORDIL) 40 milliGRAM(s) Oral every 8 hours  melatonin 3 milliGRAM(s) Oral at bedtime  metolazone 5 milliGRAM(s) Oral daily  senna 2 Tablet(s) Oral at bedtime  sodium chloride 0.9% lock flush 10 milliLiter(s) IV Push every 1 hour PRN  spironolactone 25 milliGRAM(s) Oral daily  valproate sodium IVPB 125 milliGRAM(s) IV Intermittent every 8 hours PRN    Vitals:  T(F): 98 (05-17), Max: 98.3 (05-16)  HR: 60 (05-17) (59 - 60)  BP: 121/63 (05-17) (118/57 - 152/61)  RR: 18 (05-17)  SpO2: 97% on RA    I&O's Summary    16 May 2019 07:01  -  17 May 2019 07:00  --------------------------------------------------------  IN: 820 mL / OUT: 950 mL / NET: -130 mL    Weight: 84.3 (standing) <-- 88kg (bed from 5/15, no prior standing weights)    Physical Exam:  Appearance: No acute distress; well appearing, breathing comfortably, sitting in chair at bedside,  Eyes: PERRL, EOMI, pink conjunctiva  HEENT: Normal oral mucosa  Cardiovascular: RRR, S1, S2, 2/6 MR murmur, no rubs, or gallops; + b/l LE edema; JVP not elevated with the patient sitting upright  Respiratory: Clear to auscultation bilaterally, no rales  Gastrointestinal: soft, non-tender, non-distended with normal bowel sounds  Musculoskeletal: No clubbing; no joint deformity   Neurologic: Non-focal  Lymphatic: No lymphadenopathy  Psychiatry: AAOx1 (name), pleasant, not agitated  Skin: No rashes, ecchymoses, or cyanosis    AM labs pending    Imaging: < from: CT Chest + Abd + Pelvis No Cont (05.16.19 @ 09:05) > IMPRESSION:   * Mild mediastinal and retroperitoneal lymphadenopathy, likely slightly improved compared to prior examination. *  Hepatosplenomegaly. *  Trace abdominal and pelvic ascites. Bilateral pleural effusions, loculated in the right anterior chest.  < end of copied text >     Interpretation of Telemetry: sinus w/ CHB and V pacing @ 60

## 2019-05-17 NOTE — PROGRESS NOTE ADULT - ASSESSMENT
5/8 as the above record indicates, the pt is here and is a very poor historian. I was asked by Dr Hawkins from oncology and by the pt's internist to see the pt as he did have a past of lymphoma. The oncology office will have to look up records as his history is not readily available. When I came to see the pt he was not able to supply much info about his cancer other than he had lymphoma and did not know the type or who treated him. Dr Hawkins thinks he may have been treated by his previous associate, Dr Sarmiento.     At the time of my visit the pt was alert and oriented but was not able to give specific details about any of his medical issues. His chemistries appeared all unremarkable and he was not febrile and his ct of the head showed nothing of note.  I will see if there are any records on his previous onc history and see if there is any reason to think it may have contributed to his present admit here.     5/9 events of last day was noted and pt was intubated and had heart failure. 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 In 2010 he went to [facility] and was treated for a recurrence with BR and he has remained in remission.   5/10 still intubated and sedated and labs reviewed and thus far no direct evidence for lymphoma recurrence.  . 5/11 Pt still intubated and sedated. Anemia from icu anemia causes no evidence for recurrent lymphoma.   5/12 the pt was extubated on this date and hemoglobin was noted to be 11.0 will look when stable for any evidenced of recurrent lymphoma.   5/13 notes reviewed and pt still looked somewhat disoriented will check him for nodes in the am counts ok.  . 5/14 stable but confused ands seen by neuro who feels confusion is from the cardiac problems. Pt to be evaluated for MV repair.   5/15 very poor memory and cognitive decline in this pt as he could not remember 4 objects one minute later. He has good right sided mental abilities and good spacial ability. We do not know how long or fast this cognitive decline has been in play. Will have to try to reach out to family members, he cannot recall being treated for his lymphoma. Ct head without contrast. Renal function is poor, so that we are limited with ct of the brain and body without contrast. ? multiinfarct dementia neuro cognitive studies check B12 and tsh levels. . 5/16 pt is stable and neuro evaluation proceeding with scans of the head.  5/17 pt had ct of the abdomen and pelvis and chest and the nodes in the chest appear inconsequential. The spleen is 15.9 cm minimally enlarged and liver is read and slightly enlarged with normal liver function studies. Considering the history I do not think these findings warrant a search for a lymphoma.

## 2019-05-17 NOTE — PROGRESS NOTE ADULT - SUBJECTIVE AND OBJECTIVE BOX
Patient is a 69y old  Male who presents with a chief complaint of confusion (17 May 2019 11:54)                                                               INTERVAL HPI/OVERNIGHT EVENTS:    REVIEW OF SYSTEMS:     CONSTITUTIONAL: No weakness, fevers or chills  EYES/ENT: No visual changes , no ear ache   NECK: No pain or stiffness  RESPIRATORY: No cough, wheezing,  No shortness of breath  CARDIOVASCULAR: No chest pain or palpitations  GASTROINTESTINAL: No abdominal pain  . No nausea, vomiting, or hematemesis; No diarrhea or constipation. No melena or hematochezia.  GENITOURINARY: No dysuria, frequency or hematuria  NEUROLOGICAL: No numbness or weakness  SKIN: No itching, burning, rashes, or lesions                                                                                                                                                                                                                                                                                 Medications:  MEDICATIONS  (STANDING):  allopurinol 300 milliGRAM(s) Oral daily  aspirin  chewable 81 milliGRAM(s) Oral daily  carvedilol 12.5 milliGRAM(s) Oral every 12 hours  dextrose 5%. 1000 milliLiter(s) (50 mL/Hr) IV Continuous <Continuous>  dextrose 50% Injectable 12.5 Gram(s) IV Push once  diVALproex  milliGRAM(s) Oral two times a day  docusate sodium 100 milliGRAM(s) Oral three times a day  folic acid 1 milliGRAM(s) Oral daily  furosemide   Injectable 80 milliGRAM(s) IV Push two times a day  heparin  Injectable 5000 Unit(s) SubCutaneous every 8 hours  hydrALAZINE 100 milliGRAM(s) Oral every 8 hours  insulin lispro (HumaLOG) corrective regimen sliding scale   SubCutaneous three times a day before meals  isosorbide   dinitrate Tablet (ISORDIL) 40 milliGRAM(s) Oral every 8 hours  melatonin 3 milliGRAM(s) Oral at bedtime  senna 2 Tablet(s) Oral at bedtime  spironolactone 25 milliGRAM(s) Oral daily    MEDICATIONS  (PRN):  ALBUTerol/ipratropium for Nebulization 3 milliLiter(s) Nebulizer every 6 hours PRN Bronchospasm  dextrose 40% Gel 15 Gram(s) Oral once PRN Blood Glucose LESS THAN 70 milliGRAM(s)/deciliter  glucagon  Injectable 1 milliGRAM(s) IntraMuscular once PRN Glucose LESS THAN 70 milligrams/deciliter  sodium chloride 0.9% lock flush 10 milliLiter(s) IV Push every 1 hour PRN Pre/post blood products, medications, blood draw, and to maintain line patency  valproate sodium IVPB 125 milliGRAM(s) IV Intermittent every 8 hours PRN agitation       Allergies    No Known Allergies    Intolerances      Vital Signs Last 24 Hrs  T(C): 36.7 (17 May 2019 12:57), Max: 36.8 (16 May 2019 21:51)  T(F): 98.1 (17 May 2019 12:57), Max: 98.3 (16 May 2019 21:51)  HR: 103 (17 May 2019 12:57) (59 - 103)  BP: 129/65 (17 May 2019 12:57) (121/63 - 152/61)  BP(mean): --  RR: 18 (17 May 2019 12:57) (18 - 19)  SpO2: 93% (17 May 2019 12:57) (90% - 97%)  CAPILLARY BLOOD GLUCOSE      POCT Blood Glucose.: 116 mg/dL (17 May 2019 13:01)  POCT Blood Glucose.: 95 mg/dL (17 May 2019 10:15)  POCT Blood Glucose.: 124 mg/dL (16 May 2019 21:31)  POCT Blood Glucose.: 110 mg/dL (16 May 2019 18:00)      16 @ 07:  -   @ 07:00  --------------------------------------------------------  IN: 1060 mL / OUT: 950 mL / NET: 110 mL     @ 07:  -   @ 15:23  --------------------------------------------------------  IN: 480 mL / OUT: 0 mL / NET: 480 mL      Physical Exam:    Daily     Daily Weight in k.3 (17 May 2019 06:02)  General:  Well appearing, NAD, not cachetic  HEENT:  Nonicteric, PERRLA  CV:  RRR, S1S2   Lungs:  CTA B/L, no wheezes, rales, rhonchi  Abdomen:  Soft, non-tender, no distended, positive BS  Extremities:  2+ pulses, no c/c, no edema  Skin:  Warm and dry, no rashes  :  No mittal  Neuro:  AAOx3, non-focal, grossly intact                                                                                                                                                                                                                                                                                                LABS:                               10.4   8.3   )-----------( 225      ( 17 May 2019 07:06 )             33.3                      05-    142  |  99  |  28<H>  ----------------------------<  90  3.5   |  30  |  1.83<H>    Ca    9.3      17 May 2019 07:06                         RADIOLOGY & ADDITIONAL TESTS         I personally reviewed: [  ]EKG   [  ]CXR    [  ] CT      A/P:         Discussed with :     Ruddy consultants' Notes   Time spent :

## 2019-05-17 NOTE — PROGRESS NOTE ADULT - ASSESSMENT
69 year old man with prior NHL (treated with R-CHOP 2004 and BR in 2010), chronic HFrEF (likely 2/2 Doxorubicin, EF:45%, decreased RVSF) who was sent to the ED on 5/8 with AMS and heart block - he progressed to complete heart block with encephalopathy requiring intubation and a leadless pacemaker with severe MR and CHF with STEPHEN on ckd III     1- STEPHEN on ckd III  2- CHF  3- HTN      creatinine worsening but pt is quite fluid overloaded. cont with lasix 80 mg iv bid for now. trend creatinine   hydralazine 100 mg tid   hx gout cont allopurinol 300 mg daily   agree to hold metolazone   d/w chf team

## 2019-05-17 NOTE — PROGRESS NOTE ADULT - SUBJECTIVE AND OBJECTIVE BOX
Patient is a 68y old  Male who presents with a chief complaint of confusion (08 May 2019 12:11)      HPI:  ** Patient poor historian **    Patient is a 68 year old Non-Hodgkin's Lymphoma tx with chemotherapy in , DM2 with CKD stage 3, HTN, CHF EF 35% on cardiac cath 2016, pleural effusion s/p left pleuracentesis in 10/2016, cardiomyopathy, gout and HLD presents to the ED sent in from cardiologists office because of EKG changes. Patient is not sure why he is here. He was not sure where he was, why he is here or what even the year is. He remarks that he retired a few months ago and he stopped taking his medications then because he "wasn't feeling good". He states he was wife his wife earlier but then later he states shes in [place]. Patient currently denies chest pain, shortness of breath, palpitations, syncope, fevers, chills, recent travel or sick contacts. No new meds however he has stopped taking most of his meds. He overall, feels fine and is not sure why he is in the hospital.     It's very difficult to get a clear history from patient. I have tried to reach his daughter but have not received a call back as of yet. I have called [facility] patients pharmacy and he states patient picked up furosemide and irbersartan in April but has not picked up any other meds in months. Mentation in  AAOX3.    In the ED, VSS. Labs at baseline, CT head with old stroke, Trops elevated and peaked at 50, now normal, EKG with TWI in lateral leads. (08 May 2019 10:33)    5 as the above record indicates, the pt is here and is a very poor historian. I was asked by Dr Hawkins from oncology and by the pt's internist to see the pt as he did have a past of lymphoma. The oncology office will have to look up records as his history is not readily available. When I came to see the pt he was not able to supply much info about his cancer other than he had lymphoma and did not know the type or who treated him. Dr Hawkins thinks he may have been treated by his previous associate, Dr Sarmiento.     At the time of my visit the pt was alert and oriented but was not able to give specific details about any of his medical issues. His chemistries appeared all unremarkable and he was not febrile and his ct of the head showed nothing of note.  I will see if there are any records on his previous onc history and see if there is any reason to think it may have contributed to his present admit here.  events of last day was noted and pt was intubated and had heart failure. The records were reviewed at [facility] and pt in  had a large cell lymphoma and was treated with R CHOP. In  he went to [facility] and was treated for a recurrence with BR and he has remained in remission. 5/10 still intubated and sedated and labs reviewed and thus far no direct evidence for lymphoma recurrence.  Pt still intubated and sedated. Anemia from icu anemia causes no evidence for recurrent lymphoma.  the pt was extubated on this date and hemoglobin was noted to be 11.0 will look when stable for any evidenced of recurrent lymphoma.  notes reviewed and pt still looked somewhat disoriented will check him for nodes in the am counts ok.  stable but confused ands seen by neuro who feels confusion is from the cardiac problems. Pt to be evaluated for MV repair. 5/15 very poor memory and cognitive decline in this pt as he could not remember 4 objects one minute later. He has good right sided mental abilities and good spacial ability. We do not know how long or fast this cognitive decline has been in play. Will have to try to reach out to family members, he cannot recall being treated for his lymphoma. Ct head without contrast. Renal function is poor, so that we are limited with ct of the brain and body without contrast. ? multiinfarct dementia neuro cognitive studies check B12 and tsh levels.  pt is stable and neuro evaluation proceeding with scans of the head.  pt had ct of the abdomen and pelvis and chest and the nodes in the chest appear inconsequential. The spleen is 15.9 cm minimally enlarged and liver is read and slightly enlarged with normal liver function studies. Considering the history I do not think these findings warrant a search for a lymphoma.  MEDICATIONS  (STANDING):  allopurinol 300 milliGRAM(s) Oral daily  aspirin  chewable 81 milliGRAM(s) Oral daily  carvedilol 12.5 milliGRAM(s) Oral every 12 hours  chlorhexidine 4% Liquid 1 Application(s) Topical <User Schedule>  dextrose 5%. 1000 milliLiter(s) (50 mL/Hr) IV Continuous <Continuous>  dextrose 50% Injectable 12.5 Gram(s) IV Push once  docusate sodium 100 milliGRAM(s) Oral three times a day  folic acid 1 milliGRAM(s) Oral daily  heparin  Injectable 5000 Unit(s) SubCutaneous every 8 hours  hydrALAZINE 100 milliGRAM(s) Oral every 8 hours  insulin lispro (HumaLOG) corrective regimen sliding scale   SubCutaneous three times a day before meals  isosorbide   dinitrate Tablet (ISORDIL) 40 milliGRAM(s) Oral every 8 hours  nitroglycerin  Infusion 16.667 MICROgram(s)/Min (5 mL/Hr) IV Continuous <Continuous>  piperacillin/tazobactam IVPB. 3.375 Gram(s) IV Intermittent every 8 hours  senna 2 Tablet(s) Oral at bedtime  spironolactone 25 milliGRAM(s) Oral daily                         10.4   8.3   )-----------( 225      ( 17 May 2019 07:06 )             33.3   05-    142  |  99  |  28<H>  ----------------------------<  90  3.5   |  30  |  1.83<H>    Ca    9.3      17 May 2019 07:06      Urinalysis Basic - ( 08 May 2019 00:17 )    Color: Yellow / Appearance: Slightly Turbid / S.023 / pH: x  Gluc: x / Ketone: Negative  / Bili: Small / Urobili: 3 mg/dL   Blood: x / Protein: 300 mg/dL / Nitrite: Negative   Leuk Esterase: Negative / RBC: 2 /hpf / WBC 7 /HPF   Sq Epi: x / Non Sq Epi: 1 /hpf / Bacteria: Negative        ROS:  Negative except for:    PAST MEDICAL & SURGICAL HISTORY:  Pleural effusion  Moderate mitral regurgitation  Systolic heart failure  Rhinitis, allergic  Essential hypertension  DM type 2 (diabetes mellitus, type 2)  Non-Hodgkins Lymphoma  Non-Hodgkin lymphoma: h/o axillary dissection      SOCIAL HISTORY:    FAMILY HISTORY:  Family history of non-Hodgkin's lymphoma (Sibling)  Family history of CHF (congestive heart failure)      Allergies    No Known Allergies    Intolerances        PHYSICAL EXAM  General: lying in the bed and was being cared for by the staff and appeared still to be somewhat confused.  HEENT: stable   Neck: supple  CV: rr   Lungs: decreased breath sounds at the bases  Abdomen: soft non-tender non-distended, no hepatosplenomegaly  Ext: no clubbing cyanosis or edema  Skin: no rashes and no petechiae  Neuro: alert and oriented X3 no focal deficits    BLOOD SMEAR INTERPRETATION:    RADIOLOGY :

## 2019-05-17 NOTE — PROGRESS NOTE ADULT - ASSESSMENT
Mr. Skaggs is a 69 year old man with prior NHL (treated with R-CHOP 2004 and BR in 2010), chronic HFrEF (likely 2/2 Doxorubicin, EF:45%, decreased RVSF) who was sent to the ED on 5/8 with AMS and heart block - he progressed to complete heart block with encephalopathy requiring intubation and a leadless pacemaker.  His hospital course has included a KUSHAL revealing severe MR 2/2 a dilated MV annulus, severe pHTN (likely post-capillary 2/2 MR).  HF following during the work up for a MitraClip.  He remains hypervolemic and is tolerating afterload reducing agents.  I suspect that the patient has had dementia for some time.

## 2019-05-17 NOTE — CHART NOTE - NSCHARTNOTEFT_GEN_A_CORE
Medicine NP    Notified by RN patient c/o of headache earlier s/p Tylenol and now with agitation.  Assessed patient at bedside, is poor historian however calm at this time, AOx1. Per RN, patient was arguing with roommate earlier and c/o of HA and c/o Tylenol did not work. Patient denies blurry/double vision, weakness, numbness/tingling. From psych recs, Depacon IVPB prn agitation administered by RN. Continue to monitor overnight.    Justine Mcknight, [facility]  [id number] Medicine NP    Notified by RN patient c/o of headache earlier s/p Tylenol and now with agitation.  Assessed patient at bedside, is poor historian however calm at this time, AOx1. Per RN, patient was arguing with roommate earlier and c/o of HA and c/o Tylenol did not work. Patient denies blurry/double vision, weakness, numbness/tingling. From psych recs, Depacon IVPB prn agitation administered by RN. VSS. Continue to monitor overnight.    Justine Mcknight, [facility]  [id number]

## 2019-05-17 NOTE — PROGRESS NOTE ADULT - PROBLEM SELECTOR PLAN 2
-I suspect the patient's current MS is his baseline - would defer to Neuro -c/w Hydralazine 100 tid and Isordil 40 tid  -c/w Lasix 80mg IVP bid  -Stop Metolazone given that the Cr is uptrending  -Structural recs - outpatient follow up

## 2019-05-17 NOTE — PROGRESS NOTE ADULT - ASSESSMENT
69M w/ PMHx  NHL s/p R-CHOP 2004, CHF/CMP (chemo related?) presenting with confusion and complete heart block.   Now s/p extubation, placement of pacemaker, and now being considered for MV repair(MitraClip vs MVR).   Remains confused.   no focal infectious etiology isolated.  No role for antibiotics at this time.

## 2019-05-17 NOTE — PROGRESS NOTE ADULT - SUBJECTIVE AND OBJECTIVE BOX
[facility]  [phone]  NEPHROLOGY      INITIAL CONSULT NOTE  --------------------------------------------------------------------------------  HPI:    	Mr. Skaggs is a 69 year old man with prior NHL (treated with R-CHOP 2004 and BR in 2010), chronic HFrEF (likely 2/2 Doxorubicin, EF:45%, decreased RVSF) who was sent to the ED on 5/8 with AMS and heart block - he progressed to complete heart block with encephalopathy requiring intubation and a leadless pacemaker.   KUSHAL revealing severe MR 2/2 a dilated MV annulus, severe pHTN (likely post-capillary 2/2 MR).  diuretics used. cr increased to 1.8 renal consult called.     PAST HISTORY  --------------------------------------------------------------------------------  PAST MEDICAL & SURGICAL HISTORY:  Pleural effusion  Moderate mitral regurgitation  Systolic heart failure  Rhinitis, allergic  Essential hypertension  DM type 2 (diabetes mellitus, type 2)  Non-Hodgkins Lymphoma  Non-Hodgkin lymphoma: h/o axillary dissection    FAMILY HISTORY:  Family history of non-Hodgkin's lymphoma (Sibling)  Family history of CHF (congestive heart failure)    PAST SOCIAL HISTORY: pt unable     ALLERGIES & MEDICATIONS  --------------------------------------------------------------------------------  Allergies    No Known Allergies    Intolerances      Standing Inpatient Medications  allopurinol 300 milliGRAM(s) Oral daily  aspirin  chewable 81 milliGRAM(s) Oral daily  carvedilol 12.5 milliGRAM(s) Oral every 12 hours  dextrose 5%. 1000 milliLiter(s) IV Continuous <Continuous>  dextrose 50% Injectable 12.5 Gram(s) IV Push once  diVALproex  milliGRAM(s) Oral two times a day  docusate sodium 100 milliGRAM(s) Oral three times a day  folic acid 1 milliGRAM(s) Oral daily  furosemide   Injectable 80 milliGRAM(s) IV Push two times a day  heparin  Injectable 5000 Unit(s) SubCutaneous every 8 hours  hydrALAZINE 100 milliGRAM(s) Oral every 8 hours  insulin lispro (HumaLOG) corrective regimen sliding scale   SubCutaneous three times a day before meals  isosorbide   dinitrate Tablet (ISORDIL) 40 milliGRAM(s) Oral every 8 hours  melatonin 3 milliGRAM(s) Oral at bedtime  senna 2 Tablet(s) Oral at bedtime  spironolactone 25 milliGRAM(s) Oral daily    PRN Inpatient Medications  ALBUTerol/ipratropium for Nebulization 3 milliLiter(s) Nebulizer every 6 hours PRN  dextrose 40% Gel 15 Gram(s) Oral once PRN  glucagon  Injectable 1 milliGRAM(s) IntraMuscular once PRN  sodium chloride 0.9% lock flush 10 milliLiter(s) IV Push every 1 hour PRN  valproate sodium IVPB 125 milliGRAM(s) IV Intermittent every 8 hours PRN      REVIEW OF SYSTEMS  --------------------------------------------------------------------------------  is confused when seen unable to give ROS   VITALS/PHYSICAL EXAM  --------------------------------------------------------------------------------  T(C): 36.6 (05-17-19 @ 20:57), Max: 36.7 (05-17-19 @ 00:27)  HR: 60 (05-17-19 @ 20:57) (60 - 103)  BP: 138/65 (05-17-19 @ 20:57) (121/63 - 144/62)  RR: 18 (05-17-19 @ 20:57) (18 - 18)  SpO2: 98% (05-17-19 @ 20:57) (90% - 98%)  Wt(kg): --        05-16-19 @ 07:01  -  05-17-19 @ 07:00  --------------------------------------------------------  IN: 1060 mL / OUT: 950 mL / NET: 110 mL    05-17-19 @ 07:01  -  05-17-19 @ 23:06  --------------------------------------------------------  IN: 480 mL / OUT: 960 mL / NET: -480 mL      Physical Exam:  	Gen: Non toxic comfortable appearing   	no jvd   	Pulm: decrease bs  no rales or ronchi or wheezing  	CV: RRR, S1S2; no rub  	Back: No CVA tenderness; no sacral edema  	Abd: +BS, soft, nontender/nondistended  	: No suprapubic tenderness  	UE: Warm, no cyanosis  no clubbing,  no edema; no asterixis  	LE: Warm, no cyanosis  no clubbing, 2-  edema  	Neuro: alert and oriented. speech coherent   	Skin: Warm, no decrease skin turgor       LABS/STUDIES  --------------------------------------------------------------------------------              10.4   8.3   >-----------<  225      [05-17-19 @ 07:06]              33.3     142  |  99  |  28  ----------------------------<  90      [05-17-19 @ 07:06]  3.5   |  30  |  1.83        Ca     9.3     [05-17-19 @ 07:06]            Creatinine Trend:  SCr 1.83 [05-17 @ 07:06]  SCr 1.53 [05-16 @ 07:24]  SCr 1.62 [05-15 @ 06:56]  SCr 1.57 [05-14 @ 06:56]  SCr 1.37 [05-13 @ 02:12]    Urinalysis - [05-10-19 @ 00:35]      Color Light Yellow / Appearance Clear / SG 1.012 / pH 6.0      Gluc Negative / Ketone Negative  / Bili Negative / Urobili Negative       Blood Moderate / Protein 30 mg/dL / Leuk Est Small / Nitrite Negative      RBC 1 / WBC 1 / Hyaline  / Gran  / Sq Epi  / Non Sq Epi 1 / Bacteria       Iron 27, TIBC 311, %sat 9      [05-08-19 @ 15:41]  HbA1c 6.0      [05-09-19 @ 10:52]  TSH 3.07      [05-17-19 @ 09:16]    HCV 0.28, Nonreact      [11-28-16 @ 07:50]    Syphilis Screen (Treponema Pallidum Ab) Negative      [05-08-19 @ 15:39]

## 2019-05-17 NOTE — PROGRESS NOTE ADULT - SUBJECTIVE AND OBJECTIVE BOX
[facility], Division of Infectious Diseases  DEONNA Enriquez A. Lee  [phone]    Name: NIK GRIMM  Age: 69y  Gender: Male  MRN: [id number]    Interval History--  Notes reviewed. Pleasant, conversant, but essentially a nonhistorian. Thinks gnui=[id number], month= April, place = "the building [place] the factory [place]" and no recall/confabulates regarding recent events.    Past Medical History--  Pleural effusion  Moderate mitral regurgitation  Systolic heart failure  Rhinitis, allergic  Essential hypertension  DM type 2 (diabetes mellitus, type 2)  Diabetes Mellitus  Non-Hodgkins Lymphoma  Non-Hodgkin lymphoma  No significant past surgical history      For details regarding the patient's social history, family history, and other miscellaneous elements, please refer the initial infectious diseases consultation and/or the admitting history and physical examination for this admission.    Allergies    No Known Allergies    Intolerances        Medications--  Antibiotics:    Immunologic:    Other:  ALBUTerol/ipratropium for Nebulization PRN  allopurinol  aspirin  chewable  carvedilol  dextrose 40% Gel PRN  dextrose 5%.  dextrose 50% Injectable  diVALproex DR  docusate sodium  folic acid  furosemide   Injectable  glucagon  Injectable PRN  heparin  Injectable  hydrALAZINE  insulin lispro (HumaLOG) corrective regimen sliding scale  isosorbide   dinitrate Tablet (ISORDIL)  melatonin  metolazone  senna  sodium chloride 0.9% lock flush PRN  spironolactone  valproate sodium IVPB PRN      Review of Systems--  Review of systems unable secondary to clinical condition. .    Physical Examination--  Vital Signs: T(F): 98 (05-17-19 @ 06:02), Max: 98.3 (05-16-19 @ 21:51)  HR: 60 (05-17-19 @ 06:02)  BP: 121/63 (05-17-19 @ 06:02)  RR: 18 (05-17-19 @ 11:00)  SpO2: 90% (05-17-19 @ 11:00)  Wt(kg): --  General: Nontoxic-appearing Male in no acute distress.  HEENT: AT/NC.  Anicteric. Conjunctiva pink-pale and moist. Oropharynx clear.  Neck: Not rigid. No sense of mass.  Nodes: None palpable.  Lungs: Diminished breath sounds R>L without rales, wheezing or rhonchi  Heart: Regular rate and rhythm. II/VI systolic Murmur. No rub. No gallop. No palpable thrill.  Abdomen: Bowel sounds present and normoactive. Soft. Nondistended. Nontender. No sense of mass. No organomegaly.  Back: No spinal tenderness. No costovertebral angle tenderness.   Extremities: No cyanosis or clubbing. 1+ edema.   Skin: Warm. Dry. Good turgor. No rash. No vasculitic stigmata.  Psychiatric: Remains confused.      Laboratory Studies--  CBC                        10.4   8.3   )-----------( 225      ( 17 May 2019 07:06 )             33.3       Chemistries  05-17    142  |  99  |  28<H>  ----------------------------<  90  3.5   |  30  |  1.83<H>    Ca    9.3      17 May 2019 07:06        Culture Data  No new data

## 2019-05-17 NOTE — PROGRESS NOTE ADULT - PROBLEM SELECTOR PLAN 1
-c/w Hydralazine 100 tid and Isordil 40 tid  -c/w Lasix 80mg IVP bid  -Stop Metolazone given that the Cr is uptrending  -Structural recs - outpatient follow up -c/w current doses Isordil and Hydralazine  -c/w Coreg 12.5 bid  -c/w Aldactone 25  -Diuretics as above  -Strict I/Os, daily standing weights

## 2019-05-18 LAB
ANION GAP SERPL CALC-SCNC: 16 MMOL/L — SIGNIFICANT CHANGE UP (ref 5–17)
BUN SERPL-MCNC: 31 MG/DL — HIGH (ref 7–23)
CALCIUM SERPL-MCNC: 9.5 MG/DL — SIGNIFICANT CHANGE UP (ref 8.4–10.5)
CHLORIDE SERPL-SCNC: 93 MMOL/L — LOW (ref 96–108)
CO2 SERPL-SCNC: 33 MMOL/L — HIGH (ref 22–31)
CREAT SERPL-MCNC: 1.75 MG/DL — HIGH (ref 0.5–1.3)
GLUCOSE BLDC GLUCOMTR-MCNC: 101 MG/DL — HIGH (ref 70–99)
GLUCOSE BLDC GLUCOMTR-MCNC: 131 MG/DL — HIGH (ref 70–99)
GLUCOSE BLDC GLUCOMTR-MCNC: 142 MG/DL — HIGH (ref 70–99)
GLUCOSE BLDC GLUCOMTR-MCNC: 144 MG/DL — HIGH (ref 70–99)
GLUCOSE SERPL-MCNC: 102 MG/DL — HIGH (ref 70–99)
MAGNESIUM SERPL-MCNC: 2.3 MG/DL — SIGNIFICANT CHANGE UP (ref 1.6–2.6)
POTASSIUM SERPL-MCNC: 3.4 MMOL/L — LOW (ref 3.5–5.3)
POTASSIUM SERPL-SCNC: 3.4 MMOL/L — LOW (ref 3.5–5.3)
SODIUM SERPL-SCNC: 142 MMOL/L — SIGNIFICANT CHANGE UP (ref 135–145)

## 2019-05-18 PROCEDURE — 99233 SBSQ HOSP IP/OBS HIGH 50: CPT

## 2019-05-18 RX ADMIN — Medication 300 MILLIGRAM(S): at 14:42

## 2019-05-18 RX ADMIN — Medication 80 MILLIGRAM(S): at 17:29

## 2019-05-18 RX ADMIN — CARVEDILOL PHOSPHATE 12.5 MILLIGRAM(S): 80 CAPSULE, EXTENDED RELEASE ORAL at 21:36

## 2019-05-18 RX ADMIN — Medication 81 MILLIGRAM(S): at 14:41

## 2019-05-18 RX ADMIN — Medication 80 MILLIGRAM(S): at 06:03

## 2019-05-18 RX ADMIN — HEPARIN SODIUM 5000 UNIT(S): 5000 INJECTION INTRAVENOUS; SUBCUTANEOUS at 06:03

## 2019-05-18 RX ADMIN — CARVEDILOL PHOSPHATE 12.5 MILLIGRAM(S): 80 CAPSULE, EXTENDED RELEASE ORAL at 09:22

## 2019-05-18 RX ADMIN — SPIRONOLACTONE 25 MILLIGRAM(S): 25 TABLET, FILM COATED ORAL at 06:02

## 2019-05-18 RX ADMIN — Medication 100 MILLIGRAM(S): at 21:36

## 2019-05-18 RX ADMIN — Medication 100 MILLIGRAM(S): at 14:42

## 2019-05-18 RX ADMIN — ISOSORBIDE DINITRATE 40 MILLIGRAM(S): 5 TABLET ORAL at 21:36

## 2019-05-18 RX ADMIN — SENNA PLUS 2 TABLET(S): 8.6 TABLET ORAL at 21:36

## 2019-05-18 RX ADMIN — HEPARIN SODIUM 5000 UNIT(S): 5000 INJECTION INTRAVENOUS; SUBCUTANEOUS at 21:37

## 2019-05-18 RX ADMIN — ISOSORBIDE DINITRATE 40 MILLIGRAM(S): 5 TABLET ORAL at 06:02

## 2019-05-18 RX ADMIN — Medication 100 MILLIGRAM(S): at 06:02

## 2019-05-18 RX ADMIN — DIVALPROEX SODIUM 250 MILLIGRAM(S): 500 TABLET, DELAYED RELEASE ORAL at 06:03

## 2019-05-18 RX ADMIN — ISOSORBIDE DINITRATE 40 MILLIGRAM(S): 5 TABLET ORAL at 14:43

## 2019-05-18 RX ADMIN — Medication 1 MILLIGRAM(S): at 14:41

## 2019-05-18 RX ADMIN — Medication 3 MILLIGRAM(S): at 21:36

## 2019-05-18 RX ADMIN — HEPARIN SODIUM 5000 UNIT(S): 5000 INJECTION INTRAVENOUS; SUBCUTANEOUS at 14:41

## 2019-05-18 NOTE — PROGRESS NOTE ADULT - SUBJECTIVE AND OBJECTIVE BOX
Patient is a 69y old  Male who presents with a chief complaint of confusion (18 May 2019 13:37)                                                               INTERVAL HPI/OVERNIGHT EVENTS:    REVIEW OF SYSTEMS:     CONSTITUTIONAL: No weakness, fevers or chills  EYES/ENT: No visual changes , no ear ache   NECK: No pain or stiffness  RESPIRATORY: No cough, wheezing,  No shortness of breath  CARDIOVASCULAR: No chest pain or palpitations  GASTROINTESTINAL: No abdominal pain  . No nausea, vomiting, or hematemesis; No diarrhea or constipation. No melena or hematochezia.  GENITOURINARY: No dysuria, frequency or hematuria  NEUROLOGICAL: No numbness or weakness  SKIN: No itching, burning, rashes, or lesions                                                                                                                                                                                                                                                                                 Medications:  MEDICATIONS  (STANDING):  allopurinol 300 milliGRAM(s) Oral daily  aspirin  chewable 81 milliGRAM(s) Oral daily  carvedilol 12.5 milliGRAM(s) Oral every 12 hours  dextrose 5%. 1000 milliLiter(s) (50 mL/Hr) IV Continuous <Continuous>  dextrose 50% Injectable 12.5 Gram(s) IV Push once  diVALproex  milliGRAM(s) Oral two times a day  docusate sodium 100 milliGRAM(s) Oral three times a day  folic acid 1 milliGRAM(s) Oral daily  furosemide   Injectable 80 milliGRAM(s) IV Push two times a day  heparin  Injectable 5000 Unit(s) SubCutaneous every 8 hours  hydrALAZINE 100 milliGRAM(s) Oral every 8 hours  insulin lispro (HumaLOG) corrective regimen sliding scale   SubCutaneous three times a day before meals  isosorbide   dinitrate Tablet (ISORDIL) 40 milliGRAM(s) Oral every 8 hours  melatonin 3 milliGRAM(s) Oral at bedtime  senna 2 Tablet(s) Oral at bedtime  spironolactone 25 milliGRAM(s) Oral daily    MEDICATIONS  (PRN):  ALBUTerol/ipratropium for Nebulization 3 milliLiter(s) Nebulizer every 6 hours PRN Bronchospasm  dextrose 40% Gel 15 Gram(s) Oral once PRN Blood Glucose LESS THAN 70 milliGRAM(s)/deciliter  glucagon  Injectable 1 milliGRAM(s) IntraMuscular once PRN Glucose LESS THAN 70 milligrams/deciliter  sodium chloride 0.9% lock flush 10 milliLiter(s) IV Push every 1 hour PRN Pre/post blood products, medications, blood draw, and to maintain line patency  valproate sodium IVPB 125 milliGRAM(s) IV Intermittent every 8 hours PRN agitation       Allergies    No Known Allergies    Intolerances      Vital Signs Last 24 Hrs  T(C): 36.6 (18 May 2019 21:07), Max: 36.6 (18 May 2019 21:07)  T(F): 97.8 (18 May 2019 21:07), Max: 97.8 (18 May 2019 21:07)  HR: 60 (18 May 2019 21:07) (60 - 69)  BP: 145/68 (18 May 2019 21:07) (117/55 - 145/68)  BP(mean): --  RR: 18 (18 May 2019 21:07) (18 - 18)  SpO2: 95% (18 May 2019 21:07) (95% - 96%)  CAPILLARY BLOOD GLUCOSE      POCT Blood Glucose.: 144 mg/dL (18 May 2019 21:37)  POCT Blood Glucose.: 131 mg/dL (18 May 2019 18:25)  POCT Blood Glucose.: 142 mg/dL (18 May 2019 13:06)  POCT Blood Glucose.: 101 mg/dL (18 May 2019 09:20)      17 @ 07:  -   @ 07:00  --------------------------------------------------------  IN: 480 mL / OUT: 960 mL / NET: -480 mL     @ 07:  -   @ 22:09  --------------------------------------------------------  IN: 650 mL / OUT: 0 mL / NET: 650 mL      Physical Exam:    Daily     Daily Weight in k.9 (18 May 2019 04:21)  General:  Well appearing, NAD, not cachetic  HEENT:  Nonicteric, PERRLA  CV:  RRR, S1S2   Lungs:  CTA B/L, no wheezes, rales, rhonchi  Abdomen:  Soft, non-tender, no distended, positive BS  Extremities:  2+ pulses, no c/c, no edema  Skin:  Warm and dry, no rashes  :  No mittal  Neuro:  AAOx3, non-focal, grossly intact                                                                                                                                                                                                                                                                                                LABS:                               10.4   8.3   )-----------( 225      ( 17 May 2019 07:06 )             33.3                      05-18    142  |  93<L>  |  31<H>  ----------------------------<  102<H>  3.4<L>   |  33<H>  |  1.75<H>    Ca    9.5      18 May 2019 07:11  Mg     2.3                              RADIOLOGY & ADDITIONAL TESTS         I personally reviewed: [  ]EKG   [  ]CXR    [  ] CT      A/P:         Discussed with :     [facility]uddy consultants' Notes   Time spent :

## 2019-05-18 NOTE — PROGRESS NOTE ADULT - ASSESSMENT
69 year old man with prior NHL (treated with R-CHOP 2004 and BR in 2010), chronic HFrEF (likely 2/2 Doxorubicin, EF:45%, decreased RVSF) who was sent to the ED on 5/8 with AMS and heart block - he progressed to complete heart block with encephalopathy requiring intubation and a leadless pacemaker with severe MR and CHF with STEPHEN on ckd III     1- STEPHEN on ckd III  2- CHF  3- HTN      creatinine steady at this higher level   hydralazine 100 mg tid   hx gout cont allopurinol 300 mg daily

## 2019-05-18 NOTE — PROGRESS NOTE ADULT - PROBLEM SELECTOR PLAN 3
- MS seems to be at his baseline - defer to Neuro    HF will continue to follow    WILL Jo   Cardiology Fellow  [phone]

## 2019-05-18 NOTE — PROGRESS NOTE ADULT - SUBJECTIVE AND OBJECTIVE BOX
[facility]   [phone]  RENAL FOLLOW UP NOTE  --------------------------------------------------------------------------------  Chief Complaint:    24 hour events/subjective:  seen . disoriented but communicative       PAST HISTORY  --------------------------------------------------------------------------------  No significant changes to PMH, PSH, FHx, SHx, unless otherwise noted    ALLERGIES & MEDICATIONS  --------------------------------------------------------------------------------  Allergies    No Known Allergies    Intolerances      Standing Inpatient Medications  allopurinol 300 milliGRAM(s) Oral daily  aspirin  chewable 81 milliGRAM(s) Oral daily  carvedilol 12.5 milliGRAM(s) Oral every 12 hours  dextrose 5%. 1000 milliLiter(s) IV Continuous <Continuous>  dextrose 50% Injectable 12.5 Gram(s) IV Push once  diVALproex  milliGRAM(s) Oral two times a day  docusate sodium 100 milliGRAM(s) Oral three times a day  folic acid 1 milliGRAM(s) Oral daily  furosemide   Injectable 80 milliGRAM(s) IV Push two times a day  heparin  Injectable 5000 Unit(s) SubCutaneous every 8 hours  hydrALAZINE 100 milliGRAM(s) Oral every 8 hours  insulin lispro (HumaLOG) corrective regimen sliding scale   SubCutaneous three times a day before meals  isosorbide   dinitrate Tablet (ISORDIL) 40 milliGRAM(s) Oral every 8 hours  melatonin 3 milliGRAM(s) Oral at bedtime  senna 2 Tablet(s) Oral at bedtime  spironolactone 25 milliGRAM(s) Oral daily    PRN Inpatient Medications  ALBUTerol/ipratropium for Nebulization 3 milliLiter(s) Nebulizer every 6 hours PRN  dextrose 40% Gel 15 Gram(s) Oral once PRN  glucagon  Injectable 1 milliGRAM(s) IntraMuscular once PRN  sodium chloride 0.9% lock flush 10 milliLiter(s) IV Push every 1 hour PRN  valproate sodium IVPB 125 milliGRAM(s) IV Intermittent every 8 hours PRN      REVIEW OF SYSTEMS  --------------------------------------------------------------------------------    Gen: denies  fevers/chills,  CVS: denies chest pain/palpitations  Resp: denies SOB/Cough  GI: Denies N/V/Abd pain  : Denies dysuria/oliguria/hematuria    All other systems were reviewed and are negative, except as noted.    VITALS/PHYSICAL EXAM  --------------------------------------------------------------------------------  T(C): 36.5 (05-18-19 @ 12:50), Max: 36.6 (05-17-19 @ 20:57)  HR: 60 (05-18-19 @ 12:50) (60 - 60)  BP: 143/58 (05-18-19 @ 12:50) (138/65 - 144/62)  RR: 18 (05-18-19 @ 12:50) (18 - 18)  SpO2: 95% (05-18-19 @ 12:50) (95% - 98%)  Wt(kg): --        05-17-19 @ 07:01 - 05-18-19 @ 07:00  --------------------------------------------------------  IN: 480 mL / OUT: 960 mL / NET: -480 mL    05-18-19 @ 07:01  -  05-18-19 @ 13:37  --------------------------------------------------------  IN: 200 mL / OUT: 0 mL / NET: 200 mL      Physical Exam:  	  Gen: Non toxic comfortable appearing   	no jvd   	Pulm: decrease bs  no rales or ronchi or wheezing  	CV: RRR, S1S2; no rub  	Abd: +BS, soft, nontender/nondistended  	: No suprapubic tenderness  	UE: Warm, no cyanosis  no clubbing,  no edema; no asterixis  	LE: Warm, no cyanosis  no clubbing, 2-  edema  	  LABS/STUDIES  --------------------------------------------------------------------------------              10.4   8.3   >-----------<  225      [05-17-19 @ 07:06]              33.3     142  |  93  |  31  ----------------------------<  102      [05-18-19 @ 07:11]  3.4   |  33  |  1.75        Ca     9.5     [05-18-19 @ 07:11]      Mg     2.3     [05-18-19 @ 07:11]            Creatinine Trend:  SCr 1.75 [05-18 @ 07:11]  SCr 1.83 [05-17 @ 07:06]  SCr 1.53 [05-16 @ 07:24]  SCr 1.62 [05-15 @ 06:56]  SCr 1.57 [05-14 @ 06:56]              Urinalysis - [05-10-19 @ 00:35]      Color Light Yellow / Appearance Clear / SG 1.012 / pH 6.0      Gluc Negative / Ketone Negative  / Bili Negative / Urobili Negative       Blood Moderate / Protein 30 mg/dL / Leuk Est Small / Nitrite Negative      RBC 1 / WBC 1 / Hyaline  / Gran  / Sq Epi  / Non Sq Epi 1 / Bacteria       Iron 27, TIBC 311, %sat 9      [05-08-19 @ 15:41]  HbA1c 6.0      [05-09-19 @ 10:52]  TSH 3.07      [05-17-19 @ 09:16]    Syphilis Screen (Treponema Pallidum Ab) Negative      [05-08-19 @ 15:39]

## 2019-05-18 NOTE — PROGRESS NOTE ADULT - PROBLEM SELECTOR PLAN 2
-c/w Hydralazine 100 tid and Isordil 40 tid  -c/w Lasix 80mg IVP bid  -Structural recs - outpatient follow up -c/w Hydralazine 100 tid and Isordil 40 tid  -Structural recs - outpatient follow up

## 2019-05-18 NOTE — PROGRESS NOTE ADULT - ASSESSMENT
Mr. Skaggs is a 69 year old man with prior NHL (treated with R-CHOP 2004 and BR in 2010), chronic HFrEF (likely 2/2 Doxorubicin, EF:45%, decreased RVSF) who was sent to the ED on 5/8 with AMS and heart block - he progressed to complete heart block with encephalopathy requiring intubation and a leadless pacemaker.  His hospital course has included a KUSHAL revealing severe MR 2/2 a dilated MV annulus, severe pHTN (likely post-capillary 2/2 MR).  HF following during the work up for a MitraClip.    Still hypervolemic on exam w/ evidence of mildly elevated filling pressures; tolerating neurohormonal therapy Mr. Skaggs is a 69 year old man with prior NHL (treated with R-CHOP 2004 and BR in 2010), chronic HFrEF (likely 2/2 Doxorubicin, EF:45%, decreased RVSF) who was sent to the ED on 5/8 with AMS and heart block - he progressed to complete heart block with encephalopathy requiring intubation and a leadless pacemaker.  His hospital course has included a KUSHAL revealing severe MR 2/2 a dilated MV annulus, severe pHTN (likely post-capillary 2/2 MR).  HF following during the work up for a MitraClip.      Still hypervolemic on exam w/ evidence of mildly elevated filling pressures; tolerating neurohormonal therapy

## 2019-05-18 NOTE — PROGRESS NOTE ADULT - PROBLEM SELECTOR PLAN 1
-c/w current doses Isordil and Hydralazine  -c/w Coreg 12.5 bid  -c/w Aldactone 25  -Diuretics as above; consider decreasing diuretic dosages tomorrow   -Strict I/Os, daily standing weights -c/w current doses Isordil and Hydralazine  -c/w Coreg 12.5 bid  -c/w Aldactone 25 mg  -Diuretics as above  -Strict I/Os, daily standing weights -c/w current doses Isordil and Hydralazine  -c/w Coreg 12.5 bid  -c/w Aldactone 25 mg  -c/w Lasix 80mg IVP bid  -Strict I/Os, daily standing weights

## 2019-05-18 NOTE — PROGRESS NOTE ADULT - ASSESSMENT
68 year old Non-Hodgkin's Lymphoma tx with chemotherapy in 2004, DM2 with CKD stage 3, HTN, CHF EF 35% on cardiac cath 12/ 2016, pleural effusion s/p left pleuracentesis in 10/2016, cardiomyopathy, gout and HLD presents to the ED sent in from cardiologists office because of AMS    # Respi failure/apnea/hypoxia-  sp Extubated  CxR-unchanged rt loculated small effusion, mild interstitial edema    # CHB sp micra ppm implant 5/10    #Acute encephalopathy. ?etiology  r/o acute delirium on vascular dementia  less likely infectious etiology,   -chronic parietal infarct in CT head   MR brain reviewed  , neuro cs fu  vitb12, folate, iron panel, rpr and tsh levels wnl   will consider LP r/o leptomeningeal involvement however will check CT c/a/P first   psych cs noted- depakote    # Fevers- afebrile  UA neg, CXR neg, bld cx ngtd  observe off abx       # acute on chronic  systolic heart failure.    - TTE EF 45%, likely severe MR, moderately enlarged RV , severe pulmonary hypertension. Estimated PASP = 75 mmHg.  - Medication non compliance  - titrate  furosemide w/ I+Os given signs of volume overload.   CT Sx for f/u   cont medical management       # Non-Hodgkin lymphoma of lymph nodes of neck, unspecified non-Hodgkin lymphoma type.  Plan: - treated with chemotherapy in 2014.   Anemia-monitor h/h  discussed [facility] Onc : will check CT c/a/p    # Essential hypertension.  Plan: - bp stable  - c/w arb and beta blocker.     # Type 2 diabetes mellitus with chronic kidney disease, without long-term current use of insulin, unspecified CKD stage. Plan: - fs achs  - fs controlled  - start sliding scale insulin  - h    # Chronic gout without tophus, unspecified cause, unspecified site.   - c/w allopurinol 300 mg daily.

## 2019-05-18 NOTE — PROGRESS NOTE ADULT - SUBJECTIVE AND OBJECTIVE BOX
- seen and examined patient  - states that breathing has improved         Medications:  ALBUTerol/ipratropium for Nebulization 3 milliLiter(s) Nebulizer every 6 hours PRN  allopurinol 300 milliGRAM(s) Oral daily  aspirin  chewable 81 milliGRAM(s) Oral daily  carvedilol 12.5 milliGRAM(s) Oral every 12 hours  dextrose 40% Gel 15 Gram(s) Oral once PRN  dextrose 5%. 1000 milliLiter(s) IV Continuous <Continuous>  dextrose 50% Injectable 12.5 Gram(s) IV Push once  diVALproex  milliGRAM(s) Oral two times a day  docusate sodium 100 milliGRAM(s) Oral three times a day  folic acid 1 milliGRAM(s) Oral daily  furosemide   Injectable 80 milliGRAM(s) IV Push two times a day  glucagon  Injectable 1 milliGRAM(s) IntraMuscular once PRN  heparin  Injectable 5000 Unit(s) SubCutaneous every 8 hours  hydrALAZINE 100 milliGRAM(s) Oral every 8 hours  insulin lispro (HumaLOG) corrective regimen sliding scale   SubCutaneous three times a day before meals  isosorbide   dinitrate Tablet (ISORDIL) 40 milliGRAM(s) Oral every 8 hours  melatonin 3 milliGRAM(s) Oral at bedtime  senna 2 Tablet(s) Oral at bedtime  sodium chloride 0.9% lock flush 10 milliLiter(s) IV Push every 1 hour PRN  spironolactone 25 milliGRAM(s) Oral daily  valproate sodium IVPB 125 milliGRAM(s) IV Intermittent every 8 hours PRN      PAST MEDICAL & SURGICAL HISTORY:  Pleural effusion  Moderate mitral regurgitation  Systolic heart failure  Rhinitis, allergic  Essential hypertension  DM type 2 (diabetes mellitus, type 2)  Non-Hodgkins Lymphoma  Non-Hodgkin lymphoma: h/o axillary dissection        Vitals:  T(F): 97.6 (05-18), Max: 98.1 (05-17)  HR: 60 (05-18) (60 - 103)  BP: 142/61 (05-18) (129/65 - 144/62)  RR: 18 (05-18)  SpO2: 96% (05-18)  I&O's Summary    17 May 2019 07:01  -  18 May 2019 07:00  --------------------------------------------------------  IN: 480 mL / OUT: 960 mL / NET: -480 mL    18 May 2019 07:01  -  18 May 2019 11:51  --------------------------------------------------------  IN: 200 mL / OUT: 0 mL / NET: 200 mL        Physical Exam:  Appearance: No acute distress; well appearing, breathing comfortably, sitting in chair at bedside,  Eyes: PERRL, EOMI, pink conjunctiva  HEENT: Normal oral mucosa  Cardiovascular: RRR, S1, S2, 2/6 MR murmur, no rubs, or gallops; + b/l LE edema; improved; JVP ~ 8 cm while patient sitting at 45 deg angle   Respiratory: Clear to auscultation bilaterally, no rales  Gastrointestinal: soft, non-tender, non-distended with normal bowel sounds  Musculoskeletal: No clubbing; no joint deformity   Neurologic: Non-focal  Lymphatic: No lymphadenopathy  Psychiatry: AAOx1 (name), pleasant, not agitated  Skin: No rashes, ecchymoses, or cyanosis                            10.4   8.3   )-----------( 225      ( 17 May 2019 07:06 )             33.3     05-18    142  |  93<L>  |  31<H>  ----------------------------<  102<H>  3.4<L>   |  33<H>  |  1.75<H>    Ca    9.5      18 May 2019 07:11  Mg     2.3     05-18    Interpretation of Telemetry:  sinus rhythm w/ CHB, V pacing w/ rate in the 70s

## 2019-05-19 DIAGNOSIS — N18.3 CHRONIC KIDNEY DISEASE, STAGE 3 (MODERATE): ICD-10-CM

## 2019-05-19 LAB
ANION GAP SERPL CALC-SCNC: 15 MMOL/L — SIGNIFICANT CHANGE UP (ref 5–17)
BUN SERPL-MCNC: 41 MG/DL — HIGH (ref 7–23)
CALCIUM SERPL-MCNC: 9.4 MG/DL — SIGNIFICANT CHANGE UP (ref 8.4–10.5)
CHLORIDE SERPL-SCNC: 92 MMOL/L — LOW (ref 96–108)
CO2 SERPL-SCNC: 33 MMOL/L — HIGH (ref 22–31)
CREAT SERPL-MCNC: 1.87 MG/DL — HIGH (ref 0.5–1.3)
GLUCOSE BLDC GLUCOMTR-MCNC: 111 MG/DL — HIGH (ref 70–99)
GLUCOSE BLDC GLUCOMTR-MCNC: 122 MG/DL — HIGH (ref 70–99)
GLUCOSE BLDC GLUCOMTR-MCNC: 152 MG/DL — HIGH (ref 70–99)
GLUCOSE BLDC GLUCOMTR-MCNC: 185 MG/DL — HIGH (ref 70–99)
GLUCOSE SERPL-MCNC: 116 MG/DL — HIGH (ref 70–99)
POTASSIUM SERPL-MCNC: 3.4 MMOL/L — LOW (ref 3.5–5.3)
POTASSIUM SERPL-SCNC: 3.4 MMOL/L — LOW (ref 3.5–5.3)
SODIUM SERPL-SCNC: 140 MMOL/L — SIGNIFICANT CHANGE UP (ref 135–145)

## 2019-05-19 PROCEDURE — 99233 SBSQ HOSP IP/OBS HIGH 50: CPT | Mod: GC

## 2019-05-19 RX ORDER — FUROSEMIDE 40 MG
80 TABLET ORAL DAILY
Refills: 0 | Status: DISCONTINUED | OUTPATIENT
Start: 2019-05-20 | End: 2019-05-21

## 2019-05-19 RX ADMIN — Medication 3 MILLIGRAM(S): at 21:15

## 2019-05-19 RX ADMIN — Medication 80 MILLIGRAM(S): at 05:17

## 2019-05-19 RX ADMIN — Medication 1 MILLIGRAM(S): at 13:41

## 2019-05-19 RX ADMIN — CARVEDILOL PHOSPHATE 12.5 MILLIGRAM(S): 80 CAPSULE, EXTENDED RELEASE ORAL at 21:16

## 2019-05-19 RX ADMIN — HEPARIN SODIUM 5000 UNIT(S): 5000 INJECTION INTRAVENOUS; SUBCUTANEOUS at 05:17

## 2019-05-19 RX ADMIN — Medication 1: at 18:22

## 2019-05-19 RX ADMIN — HEPARIN SODIUM 5000 UNIT(S): 5000 INJECTION INTRAVENOUS; SUBCUTANEOUS at 13:41

## 2019-05-19 RX ADMIN — Medication 100 MILLIGRAM(S): at 21:16

## 2019-05-19 RX ADMIN — SENNA PLUS 2 TABLET(S): 8.6 TABLET ORAL at 21:16

## 2019-05-19 RX ADMIN — ISOSORBIDE DINITRATE 40 MILLIGRAM(S): 5 TABLET ORAL at 21:16

## 2019-05-19 RX ADMIN — Medication 81 MILLIGRAM(S): at 11:52

## 2019-05-19 RX ADMIN — Medication 100 MILLIGRAM(S): at 05:17

## 2019-05-19 RX ADMIN — ISOSORBIDE DINITRATE 40 MILLIGRAM(S): 5 TABLET ORAL at 05:17

## 2019-05-19 RX ADMIN — DIVALPROEX SODIUM 250 MILLIGRAM(S): 500 TABLET, DELAYED RELEASE ORAL at 05:17

## 2019-05-19 RX ADMIN — Medication 100 MILLIGRAM(S): at 13:41

## 2019-05-19 RX ADMIN — ISOSORBIDE DINITRATE 40 MILLIGRAM(S): 5 TABLET ORAL at 13:41

## 2019-05-19 RX ADMIN — HEPARIN SODIUM 5000 UNIT(S): 5000 INJECTION INTRAVENOUS; SUBCUTANEOUS at 21:16

## 2019-05-19 RX ADMIN — SPIRONOLACTONE 25 MILLIGRAM(S): 25 TABLET, FILM COATED ORAL at 05:17

## 2019-05-19 RX ADMIN — Medication 300 MILLIGRAM(S): at 13:40

## 2019-05-19 RX ADMIN — DIVALPROEX SODIUM 250 MILLIGRAM(S): 500 TABLET, DELAYED RELEASE ORAL at 17:17

## 2019-05-19 RX ADMIN — CARVEDILOL PHOSPHATE 12.5 MILLIGRAM(S): 80 CAPSULE, EXTENDED RELEASE ORAL at 11:50

## 2019-05-19 NOTE — PROGRESS NOTE ADULT - SUBJECTIVE AND OBJECTIVE BOX
- no acute overnight events  - dyspnea much improved  - no other new/acute complaints         Medications:  ALBUTerol/ipratropium for Nebulization 3 milliLiter(s) Nebulizer every 6 hours PRN  allopurinol 300 milliGRAM(s) Oral daily  aspirin  chewable 81 milliGRAM(s) Oral daily  carvedilol 12.5 milliGRAM(s) Oral every 12 hours  dextrose 40% Gel 15 Gram(s) Oral once PRN  dextrose 5%. 1000 milliLiter(s) IV Continuous <Continuous>  dextrose 50% Injectable 12.5 Gram(s) IV Push once  diVALproex  milliGRAM(s) Oral two times a day  docusate sodium 100 milliGRAM(s) Oral three times a day  folic acid 1 milliGRAM(s) Oral daily  furosemide   Injectable 80 milliGRAM(s) IV Push two times a day  glucagon  Injectable 1 milliGRAM(s) IntraMuscular once PRN  heparin  Injectable 5000 Unit(s) SubCutaneous every 8 hours  hydrALAZINE 100 milliGRAM(s) Oral every 8 hours  insulin lispro (HumaLOG) corrective regimen sliding scale   SubCutaneous three times a day before meals  isosorbide   dinitrate Tablet (ISORDIL) 40 milliGRAM(s) Oral every 8 hours  melatonin 3 milliGRAM(s) Oral at bedtime  senna 2 Tablet(s) Oral at bedtime  sodium chloride 0.9% lock flush 10 milliLiter(s) IV Push every 1 hour PRN  spironolactone 25 milliGRAM(s) Oral daily  valproate sodium IVPB 125 milliGRAM(s) IV Intermittent every 8 hours PRN      PAST MEDICAL & SURGICAL HISTORY:  Pleural effusion  Moderate mitral regurgitation  Systolic heart failure  Rhinitis, allergic  Essential hypertension  DM type 2 (diabetes mellitus, type 2)  Non-Hodgkins Lymphoma  Non-Hodgkin lymphoma: h/o axillary dissection        Vitals:  T(F): 98.1 (05-19), Max: 98.1 (05-19)  HR: 60 (05-19) (60 - 69)  BP: 126/64 (05-19) (117/55 - 145/68)  RR: 18 (05-19)  SpO2: 96% (05-19)  I&O's Summary    18 May 2019 07:01  -  19 May 2019 07:00  --------------------------------------------------------  IN: 650 mL / OUT: 0 mL / NET: 650 mL        Physical Exam:  Appearance: No acute distress; well appearing, breathing comfortably, sitting in chair at bedside,  Eyes: PERRL, EOMI, pink conjunctiva  HEENT: Normal oral mucosa  Cardiovascular: RRR, S1, S2, 2/6 MR murmur, no rubs, or gallops; + b/l LE edema; JVP not elevated (~ 5-6 cm while lying 45 deg angle)   Respiratory: Clear to auscultation bilaterally, no rales  Gastrointestinal: soft, non-tender, non-distended with normal bowel sounds  Musculoskeletal: No clubbing; no joint deformity   Neurologic: Non-focal  Lymphatic: No lymphadenopathy  Psychiatry: Eliecer (name), pleasant, not agitated  Skin: No rashes, ecchymoses, or cyanosis        05-19    140  |  92<L>  |  41<H>  ----------------------------<  116<H>  3.4<L>   |  33<H>  |  1.87<H>    Ca    9.4      19 May 2019 06:13  Mg     2.3     05-18    Interpretation of Telemetry:  sinus rhythm w/ underlying CHB, V paced w/ HR in the 70s - no acute overnight events  - dyspnea much improved  - no other new/acute complaints         Medications:  ALBUTerol/ipratropium for Nebulization 3 milliLiter(s) Nebulizer every 6 hours PRN  allopurinol 300 milliGRAM(s) Oral daily  aspirin  chewable 81 milliGRAM(s) Oral daily  carvedilol 12.5 milliGRAM(s) Oral every 12 hours  dextrose 40% Gel 15 Gram(s) Oral once PRN  dextrose 5%. 1000 milliLiter(s) IV Continuous <Continuous>  dextrose 50% Injectable 12.5 Gram(s) IV Push once  diVALproex  milliGRAM(s) Oral two times a day  docusate sodium 100 milliGRAM(s) Oral three times a day  folic acid 1 milliGRAM(s) Oral daily  furosemide   Injectable 80 milliGRAM(s) IV Push two times a day  glucagon  Injectable 1 milliGRAM(s) IntraMuscular once PRN  heparin  Injectable 5000 Unit(s) SubCutaneous every 8 hours  hydrALAZINE 100 milliGRAM(s) Oral every 8 hours  insulin lispro (HumaLOG) corrective regimen sliding scale   SubCutaneous three times a day before meals  isosorbide   dinitrate Tablet (ISORDIL) 40 milliGRAM(s) Oral every 8 hours  melatonin 3 milliGRAM(s) Oral at bedtime  senna 2 Tablet(s) Oral at bedtime  sodium chloride 0.9% lock flush 10 milliLiter(s) IV Push every 1 hour PRN  spironolactone 25 milliGRAM(s) Oral daily  valproate sodium IVPB 125 milliGRAM(s) IV Intermittent every 8 hours PRN      PAST MEDICAL & SURGICAL HISTORY:  Pleural effusion  Moderate mitral regurgitation  Systolic heart failure  Rhinitis, allergic  Essential hypertension  DM type 2 (diabetes mellitus, type 2)  Non-Hodgkins Lymphoma  Non-Hodgkin lymphoma: h/o axillary dissection        Vitals:  T(F): 98.1 (05-19), Max: 98.1 (05-19)  HR: 60 (05-19) (60 - 69)  BP: 126/64 (05-19) (117/55 - 145/68)  RR: 18 (05-19)  SpO2: 96% (05-19)  I&O's Summary    18 May 2019 07:01  -  19 May 2019 07:00  --------------------------------------------------------  IN: 650 mL / OUT: 0 mL / NET: 650 mL        Physical Exam:  Appearance: No acute distress; well appearing, breathing comfortably, sitting in chair at bedside,  Eyes: PERRL, EOMI, pink conjunctiva  HEENT: Normal oral mucosa  Cardiovascular: RRR, S1, S2, 2/6 MR murmur, no rubs, or gallops; JVP slightly elevated; improved as compared to previous exams    Respiratory: Clear to auscultation bilaterally, no rales  Gastrointestinal: soft, non-tender, non-distended with normal bowel sounds  Musculoskeletal: No clubbing; no joint deformity; +BLE pitting edema    Neurologic: Non-focal  Lymphatic: No lymphadenopathy  Psychiatry: Eliecer (name), pleasant, not agitated  Skin: No rashes, ecchymoses, or cyanosis        05-19    140  |  92<L>  |  41<H>  ----------------------------<  116<H>  3.4<L>   |  33<H>  |  1.87<H>    Ca    9.4      19 May 2019 06:13  Mg     2.3     05-18    Interpretation of Telemetry:  sinus rhythm w/ underlying CHB, V paced w/ HR in the 70s

## 2019-05-19 NOTE — PROGRESS NOTE ADULT - SUBJECTIVE AND OBJECTIVE BOX
Patient is a 69y old  Male who presents with a chief complaint of confusion (19 May 2019 09:45)                                                               INTERVAL HPI/OVERNIGHT EVENTS:    REVIEW OF SYSTEMS:     CONSTITUTIONAL: No weakness, fevers or chills  EYES/ENT: No visual changes , no ear ache   NECK: No pain or stiffness  RESPIRATORY: No cough, wheezing,  No shortness of breath  CARDIOVASCULAR: No chest pain or palpitations  GASTROINTESTINAL: No abdominal pain  . No nausea, vomiting, or hematemesis; No diarrhea or constipation. No melena or hematochezia.  GENITOURINARY: No dysuria, frequency or hematuria  NEUROLOGICAL: No numbness or weakness  SKIN: No itching, burning, rashes, or lesions                                                                                                                                                                                                                                                                                 Medications:  MEDICATIONS  (STANDING):  allopurinol 300 milliGRAM(s) Oral daily  aspirin  chewable 81 milliGRAM(s) Oral daily  carvedilol 12.5 milliGRAM(s) Oral every 12 hours  dextrose 5%. 1000 milliLiter(s) (50 mL/Hr) IV Continuous <Continuous>  dextrose 50% Injectable 12.5 Gram(s) IV Push once  diVALproex  milliGRAM(s) Oral two times a day  docusate sodium 100 milliGRAM(s) Oral three times a day  folic acid 1 milliGRAM(s) Oral daily  heparin  Injectable 5000 Unit(s) SubCutaneous every 8 hours  hydrALAZINE 100 milliGRAM(s) Oral every 8 hours  insulin lispro (HumaLOG) corrective regimen sliding scale   SubCutaneous three times a day before meals  isosorbide   dinitrate Tablet (ISORDIL) 40 milliGRAM(s) Oral every 8 hours  melatonin 3 milliGRAM(s) Oral at bedtime  senna 2 Tablet(s) Oral at bedtime  spironolactone 25 milliGRAM(s) Oral daily    MEDICATIONS  (PRN):  ALBUTerol/ipratropium for Nebulization 3 milliLiter(s) Nebulizer every 6 hours PRN Bronchospasm  dextrose 40% Gel 15 Gram(s) Oral once PRN Blood Glucose LESS THAN 70 milliGRAM(s)/deciliter  glucagon  Injectable 1 milliGRAM(s) IntraMuscular once PRN Glucose LESS THAN 70 milligrams/deciliter  sodium chloride 0.9% lock flush 10 milliLiter(s) IV Push every 1 hour PRN Pre/post blood products, medications, blood draw, and to maintain line patency  valproate sodium IVPB 125 milliGRAM(s) IV Intermittent every 8 hours PRN agitation       Allergies    No Known Allergies    Intolerances      Vital Signs Last 24 Hrs  T(C): 36.4 (19 May 2019 12:47), Max: 36.7 (19 May 2019 05:05)  T(F): 97.6 (19 May 2019 12:47), Max: 98.1 (19 May 2019 05:05)  HR: 60 (19 May 2019 12:47) (60 - 60)  BP: 118/57 (19 May 2019 12:47) (118/57 - 145/68)  BP(mean): --  RR: 18 (19 May 2019 12:47) (18 - 18)  SpO2: 96% (19 May 2019 12:47) (95% - 96%)  CAPILLARY BLOOD GLUCOSE      POCT Blood Glucose.: 185 mg/dL (19 May 2019 18:04)  POCT Blood Glucose.: 122 mg/dL (19 May 2019 13:29)  POCT Blood Glucose.: 111 mg/dL (19 May 2019 09:36)  POCT Blood Glucose.: 144 mg/dL (18 May 2019 21:37)       @ 07:  -   @ 07:00  --------------------------------------------------------  IN: 650 mL / OUT: 0 mL / NET: 650 mL     @ 07: @ 20:29  --------------------------------------------------------  IN: 0 mL / OUT: 600 mL / NET: -600 mL      Physical Exam:    Daily     Daily Weight in k.8 (19 May 2019 05:05)  General:  Well appearing, NAD, not cachetic  HEENT:  Nonicteric, PERRLA  CV:  RRR, S1S2   Lungs:  CTA B/L, no wheezes, rales, rhonchi  Abdomen:  Soft, non-tender, no distended, positive BS  Extremities:  2+ pulses, no c/c, no edema  Skin:  Warm and dry, no rashes  :  No kyrie  Neuro:  AAOx3, non-focal, grossly intact                                                                                                                                                                                                                                                                                                LABS:                                140  |  92<L>  |  41<H>  ----------------------------<  116<H>  3.4<L>   |  33<H>  |  1.87<H>    Ca    9.4      19 May 2019 06:13  Mg     2.3                              RADIOLOGY & ADDITIONAL TESTS         I personally reviewed: [  ]EKG   [  ]CXR    [  ] CT      A/P:         Discussed with :     [employer] consultants' Notes   Time spent : Patient is a 69y old  Male who presents with a chief complaint of confusion (19 May 2019 09:45)                                                               INTERVAL HPI/OVERNIGHT EVENTS:    REVIEW OF SYSTEMS:     CONSTITUTIONAL: No weakness, fevers or chills  RESPIRATORY: No cough, wheezing,  No shortness of breath  CARDIOVASCULAR: No chest pain or palpitations  GASTROINTESTINAL: No abdominal pain  . No nausea, vomiting, or hematemesis; No diarrhea or constipation. No melena or hematochezia.  GENITOURINARY: No dysuria, frequency or hematuria  NEUROLOGICAL: No numbness or weakness                                                                                                                                                                                                                                                                           Medications:  MEDICATIONS  (STANDING):  allopurinol 300 milliGRAM(s) Oral daily  aspirin  chewable 81 milliGRAM(s) Oral daily  carvedilol 12.5 milliGRAM(s) Oral every 12 hours  dextrose 5%. 1000 milliLiter(s) (50 mL/Hr) IV Continuous <Continuous>  dextrose 50% Injectable 12.5 Gram(s) IV Push once  diVALproex  milliGRAM(s) Oral two times a day  docusate sodium 100 milliGRAM(s) Oral three times a day  folic acid 1 milliGRAM(s) Oral daily  heparin  Injectable 5000 Unit(s) SubCutaneous every 8 hours  hydrALAZINE 100 milliGRAM(s) Oral every 8 hours  insulin lispro (HumaLOG) corrective regimen sliding scale   SubCutaneous three times a day before meals  isosorbide   dinitrate Tablet (ISORDIL) 40 milliGRAM(s) Oral every 8 hours  melatonin 3 milliGRAM(s) Oral at bedtime  senna 2 Tablet(s) Oral at bedtime  spironolactone 25 milliGRAM(s) Oral daily    MEDICATIONS  (PRN):  ALBUTerol/ipratropium for Nebulization 3 milliLiter(s) Nebulizer every 6 hours PRN Bronchospasm  dextrose 40% Gel 15 Gram(s) Oral once PRN Blood Glucose LESS THAN 70 milliGRAM(s)/deciliter  glucagon  Injectable 1 milliGRAM(s) IntraMuscular once PRN Glucose LESS THAN 70 milligrams/deciliter  sodium chloride 0.9% lock flush 10 milliLiter(s) IV Push every 1 hour PRN Pre/post blood products, medications, blood draw, and to maintain line patency  valproate sodium IVPB 125 milliGRAM(s) IV Intermittent every 8 hours PRN agitation       Allergies    No Known Allergies    Intolerances      Vital Signs Last 24 Hrs  T(C): 36.4 (19 May 2019 12:47), Max: 36.7 (19 May 2019 05:05)  T(F): 97.6 (19 May 2019 12:47), Max: 98.1 (19 May 2019 05:05)  HR: 60 (19 May 2019 12:47) (60 - 60)  BP: 118/57 (19 May 2019 12:47) (118/57 - 145/68)  BP(mean): --  RR: 18 (19 May 2019 12:47) (18 - 18)  SpO2: 96% (19 May 2019 12:47) (95% - 96%)  CAPILLARY BLOOD GLUCOSE      POCT Blood Glucose.: 185 mg/dL (19 May 2019 18:04)  POCT Blood Glucose.: 122 mg/dL (19 May 2019 13:29)  POCT Blood Glucose.: 111 mg/dL (19 May 2019 09:36)  POCT Blood Glucose.: 144 mg/dL (18 May 2019 21:37)       @ 07:  -   @ 07:00  --------------------------------------------------------  IN: 650 mL / OUT: 0 mL / NET: 650 mL     @ 07:01  -   @ 20:29  --------------------------------------------------------  IN: 0 mL / OUT: 600 mL / NET: -600 mL      Physical Exam:   Daily Weight in k.8 (19 May 2019 05:05)  General:  Well appearing, NAD, not cachetic  HEENT:  Nonicteric, PERRLA  CV:  RRR, S1S2   Lungs:  decreased BS at bases   Abdomen:  Soft, non-tender, no distended, positive BS  Extremities:   edema  Skin:  Warm and dry, no rashes  :  No mittal                                                                                                                                                                                                                                                                                           LABS:                                140  |  92<L>  |  41<H>  ----------------------------<  116<H>  3.4<L>   |  33<H>  |  1.87<H>    Ca    9.4      19 May 2019 06:13  Mg     2.3     05-18

## 2019-05-19 NOTE — PROGRESS NOTE ADULT - ASSESSMENT
Mr. Skaggs is a 69 year old man with prior NHL (treated with R-CHOP 2004 and BR in 2010), chronic HFrEF (likely 2/2 Doxorubicin, EF:45%, decreased RVSF) who was sent to the ED on 5/8 with AMS and heart block - he progressed to complete heart block with encephalopathy requiring intubation and a leadless pacemaker.  His hospital course has included a KUSHAL revealing severe MR 2/2 a dilated MV annulus, severe pHTN (likely post-capillary 2/2 MR).  HF following during the work up for a MitraClip.    Still hypervolemic on exam however much improved; tolerating neurohormonal therapy Mr. Skaggs is a 69 year old man with prior NHL (treated with R-CHOP 2004 and BR in 2010), chronic HFrEF (likely 2/2 Doxorubicin, EF:45%, decreased RVSF) who was sent to the ED on 5/8 with AMS and heart block - he progressed to complete heart block with encephalopathy requiring intubation and a leadless pacemaker.  His hospital course has included a KUSHAL revealing severe MR 2/2 a dilated MV annulus, severe pHTN (likely post-capillary 2/2 MR).  HF following during the work up for a MitraClip.    Still hypervolemic on exam w/ evidence of elevated filling pressures; however improved; tolerating neurohormonal therapy  Scr at new baseline (~ 1.7-1.8) Mr. Skaggs is a 69 year old man with prior NHL (treated with R-CHOP 2004 and BR in 2010), chronic HFrEF (likely 2/2 Doxorubicin, EF:45%, decreased RVSF) who was sent to the ED on 5/8 with AMS and heart block - he progressed to complete heart block with encephalopathy requiring intubation and a leadless pacemaker.  His hospital course has included a KUSHAL revealing severe MR 2/2 a dilated MV annulus, severe pHTN (likely post-capillary 2/2 MR).  HF following during the work up for a MitraClip.  Still hypervolemic on exam w/ evidence of elevated filling pressures; however improved; tolerating neurohormonal therapy.

## 2019-05-19 NOTE — PROGRESS NOTE ADULT - PROBLEM SELECTOR PLAN 1
-c/w current doses Isordil and Hydralazine  -c/w Coreg 12.5 bid  -c/w Aldactone 25 mg  -c/w Lasix 80mg IV bid  -Strict I/Os, daily standing weights    - re-assess volume status tomorrow and consider transitioning to oral diuretic therapy -c/w current doses Isordil and Hydralazine  -c/w Coreg 12.5 bid  -c/w Aldactone 25 mg  -decrease Lasix to 80mg IV daily  -Strict I/Os, daily standing weights    - re-assess volume status tomorrow and consider transitioning to oral diuretic therapy -c/w current doses Isordil and Hydralazine  -c/w Coreg 12.5 bid  -c/w Aldactone 25 mg  -decrease Lasix to 80mg IV daily  -Strict I/Os, daily standing weights  - re-assess volume status tomorrow and consider transitioning to oral diuretic therapy

## 2019-05-19 NOTE — PROGRESS NOTE ADULT - ASSESSMENT
68 year old Non-Hodgkin's Lymphoma tx with chemotherapy in 2004, DM2 with CKD stage 3, HTN, CHF EF 35% on cardiac cath 12/ 2016, pleural effusion s/p left pleuracentesis in 10/2016, cardiomyopathy, gout and HLD presents to the ED sent in from cardiologists office because of AMS    # Respi failure/apnea/hypoxia-  sp Extubated  CxR-unchanged rt loculated small effusion, mild interstitial edema    # CHB sp micra ppm implant 5/10    #Acute encephalopathy. ?etiology  r/o acute delirium on vascular dementia  less likely infectious etiology,   -chronic parietal infarct in CT head   MR brain reviewed  , neuro cs fu  vitb12, folate, iron panel, rpr and tsh levels wnl   will consider LP r/o leptomeningeal involvement however will check CT c/a/P first   psych cs noted- depakote    # Fevers- afebrile  UA neg, CXR neg, bld cx ngtd  observe off abx       # acute on chronic  systolic heart failure.    - TTE EF 45%, likely severe MR, moderately enlarged RV , severe pulmonary hypertension. Estimated PASP = 75 mmHg.  - Medication non compliance  - titrate  furosemide w/ I+Os given signs of volume overload.   CT Sx for f/u   cont medical management       # Non-Hodgkin lymphoma of lymph nodes of neck, unspecified non-Hodgkin lymphoma type.  Plan: - treated with chemotherapy in 2014.   Anemia-monitor h/h  discussed [facility] [place] On[facility] : will check CT c/a/p    # Essential hypertension.  Plan: - bp stable  - c/w arb and beta blocker.     # Type 2 diabetes mellitus with chronic kidney disease, without long-term current use of insulin, unspecified CKD stage. Plan: - fs achs  - fs controlled  - start sliding scale insulin  - h    # Chronic gout without tophus, unspecified cause, unspecified site.   - c/w allopurinol 300 mg daily. 68 year old Non-Hodgkin's Lymphoma tx with chemotherapy in 2004, DM2 with CKD stage 3, HTN, CHF EF 35% on cardiac cath 12/ 2016, pleural effusion s/p left pleuracentesis in 10/2016, cardiomyopathy, gout and HLD presents to the ED sent in from cardiologists office because of AMS    # Respi failure/apnea/hypoxia-  sp Extubated  CxR-unchanged rt loculated small effusion, mild interstitial edema    # CHB sp micra ppm implant 5/10    #Acute encephalopathy. : tahmina underlying dementia  chronic parietal infarct in CT head   MR brain reviewed  , neuro cs fu  vitb12, folate, iron panel, rpr and tsh levels wnl  unlikley leptomeningeal involvement  given no evidence of lymphoma ..   appreciate onco input :::  pt had ct of the abdomen and pelvis and chest and the nodes in the chest appear inconsequential. The spleen is 15.9 cm minimally enlarged and liver is read and slightly enlarged with normal liver function studies. Considering the history I do not think these findings warrant a search for a lymphoma.    # Fevers- afebrile  UA neg, CXR neg, bld cx ngtd  observe off abx       # acute on chronic  systolic heart failure.    - TTE EF 45%, likely severe MR, moderately enlarged RV , severe pulmonary hypertension. Estimated PASP = 75 mmHg.  - diuresis per HF team ..monitor Cr   - f/u with surgery         # Non-Hodgkin lymphoma of lymph nodes of neck, unspecified non-Hodgkin lymphoma type.  Plan: - treated with chemotherapy in 2014.   Anemia-monitor h/h  as above     # Essential hypertension.  Plan: - bp stable  - off  arb and beta blocker.     # Type 2 diabetes mellitus with chronic kidney disease, without long-term current use of insulin, unspecified CKD stage. Plan: - fs achs  - fs controlled  - start sliding scale insulin      # Chronic gout without tophus, unspecified cause, unspecified site.   - c/w allopurinol 300 mg daily.   \    STEPHEN :  f/.u with renal ... off ARB

## 2019-05-19 NOTE — PROGRESS NOTE ADULT - PROBLEM SELECTOR PLAN 3
- MS seems to be at his baseline - defer to Neuro    HF will continue to follow    WILL Jo   Cardiology Fellow  [phone] - MS seems to be at his baseline - defer to Neuro

## 2019-05-19 NOTE — PROGRESS NOTE ADULT - PROBLEM SELECTOR PLAN 4
Scr at new baseline   Cont current management as above Scr at new baseline   Cont current management as above    HF will continue to follow    WILL Jo   Cardiology Fellow  [phone]

## 2019-05-20 LAB
ANION GAP SERPL CALC-SCNC: 15 MMOL/L — SIGNIFICANT CHANGE UP (ref 5–17)
BUN SERPL-MCNC: 48 MG/DL — HIGH (ref 7–23)
CALCIUM SERPL-MCNC: 9.3 MG/DL — SIGNIFICANT CHANGE UP (ref 8.4–10.5)
CHLORIDE SERPL-SCNC: 87 MMOL/L — LOW (ref 96–108)
CO2 SERPL-SCNC: 33 MMOL/L — HIGH (ref 22–31)
CREAT SERPL-MCNC: 1.83 MG/DL — HIGH (ref 0.5–1.3)
GLUCOSE BLDC GLUCOMTR-MCNC: 108 MG/DL — HIGH (ref 70–99)
GLUCOSE BLDC GLUCOMTR-MCNC: 126 MG/DL — HIGH (ref 70–99)
GLUCOSE BLDC GLUCOMTR-MCNC: 131 MG/DL — HIGH (ref 70–99)
GLUCOSE BLDC GLUCOMTR-MCNC: 242 MG/DL — HIGH (ref 70–99)
GLUCOSE SERPL-MCNC: 100 MG/DL — HIGH (ref 70–99)
POTASSIUM SERPL-MCNC: 3.4 MMOL/L — LOW (ref 3.5–5.3)
POTASSIUM SERPL-SCNC: 3.4 MMOL/L — LOW (ref 3.5–5.3)
SODIUM SERPL-SCNC: 135 MMOL/L — SIGNIFICANT CHANGE UP (ref 135–145)

## 2019-05-20 RX ORDER — POTASSIUM CHLORIDE 20 MEQ
40 PACKET (EA) ORAL ONCE
Refills: 0 | Status: COMPLETED | OUTPATIENT
Start: 2019-05-20 | End: 2019-05-20

## 2019-05-20 RX ADMIN — ISOSORBIDE DINITRATE 40 MILLIGRAM(S): 5 TABLET ORAL at 13:23

## 2019-05-20 RX ADMIN — Medication 80 MILLIGRAM(S): at 05:24

## 2019-05-20 RX ADMIN — Medication 1 MILLIGRAM(S): at 13:23

## 2019-05-20 RX ADMIN — Medication 300 MILLIGRAM(S): at 13:23

## 2019-05-20 RX ADMIN — DIVALPROEX SODIUM 250 MILLIGRAM(S): 500 TABLET, DELAYED RELEASE ORAL at 05:23

## 2019-05-20 RX ADMIN — Medication 100 MILLIGRAM(S): at 13:23

## 2019-05-20 RX ADMIN — HEPARIN SODIUM 5000 UNIT(S): 5000 INJECTION INTRAVENOUS; SUBCUTANEOUS at 05:23

## 2019-05-20 RX ADMIN — Medication 100 MILLIGRAM(S): at 22:08

## 2019-05-20 RX ADMIN — Medication 81 MILLIGRAM(S): at 13:23

## 2019-05-20 RX ADMIN — Medication 100 MILLIGRAM(S): at 05:23

## 2019-05-20 RX ADMIN — ISOSORBIDE DINITRATE 40 MILLIGRAM(S): 5 TABLET ORAL at 05:23

## 2019-05-20 RX ADMIN — CARVEDILOL PHOSPHATE 12.5 MILLIGRAM(S): 80 CAPSULE, EXTENDED RELEASE ORAL at 09:39

## 2019-05-20 RX ADMIN — HEPARIN SODIUM 5000 UNIT(S): 5000 INJECTION INTRAVENOUS; SUBCUTANEOUS at 13:23

## 2019-05-20 RX ADMIN — Medication 40 MILLIEQUIVALENT(S): at 13:22

## 2019-05-20 RX ADMIN — ISOSORBIDE DINITRATE 40 MILLIGRAM(S): 5 TABLET ORAL at 22:08

## 2019-05-20 RX ADMIN — CARVEDILOL PHOSPHATE 12.5 MILLIGRAM(S): 80 CAPSULE, EXTENDED RELEASE ORAL at 22:08

## 2019-05-20 RX ADMIN — SENNA PLUS 2 TABLET(S): 8.6 TABLET ORAL at 22:08

## 2019-05-20 RX ADMIN — Medication 3 MILLIGRAM(S): at 22:08

## 2019-05-20 RX ADMIN — HEPARIN SODIUM 5000 UNIT(S): 5000 INJECTION INTRAVENOUS; SUBCUTANEOUS at 22:07

## 2019-05-20 RX ADMIN — DIVALPROEX SODIUM 250 MILLIGRAM(S): 500 TABLET, DELAYED RELEASE ORAL at 18:19

## 2019-05-20 RX ADMIN — SPIRONOLACTONE 25 MILLIGRAM(S): 25 TABLET, FILM COATED ORAL at 05:26

## 2019-05-20 RX ADMIN — Medication 100 MILLIGRAM(S): at 05:24

## 2019-05-20 NOTE — PROGRESS NOTE ADULT - SUBJECTIVE AND OBJECTIVE BOX
Patient is a 69y old  Male who presents with a chief complaint of confusion (19 May 2019 20:28)      INTERVAL HPI/OVERNIGHT EVENTS: noted, feels well, no new events      Vital Signs Last 24 Hrs  T(C): 36.7 (20 May 2019 04:18), Max: 36.7 (19 May 2019 21:28)  T(F): 98 (20 May 2019 04:18), Max: 98 (19 May 2019 21:28)  HR: 58 (20 May 2019 04:18) (58 - 59)  BP: 108/68 (20 May 2019 04:18) (108/68 - 133/66)  BP(mean): --  RR: 18 (20 May 2019 04:18) (17 - 18)  SpO2: 95% (20 May 2019 04:18) (94% - 95%)    ALBUTerol/ipratropium for Nebulization 3 milliLiter(s) Nebulizer every 6 hours PRN  allopurinol 300 milliGRAM(s) Oral daily  aspirin  chewable 81 milliGRAM(s) Oral daily  carvedilol 12.5 milliGRAM(s) Oral every 12 hours  dextrose 40% Gel 15 Gram(s) Oral once PRN  dextrose 5%. 1000 milliLiter(s) IV Continuous <Continuous>  dextrose 50% Injectable 12.5 Gram(s) IV Push once  diVALproex  milliGRAM(s) Oral two times a day  docusate sodium 100 milliGRAM(s) Oral three times a day  folic acid 1 milliGRAM(s) Oral daily  furosemide   Injectable 80 milliGRAM(s) IV Push daily  glucagon  Injectable 1 milliGRAM(s) IntraMuscular once PRN  heparin  Injectable 5000 Unit(s) SubCutaneous every 8 hours  hydrALAZINE 100 milliGRAM(s) Oral every 8 hours  insulin lispro (HumaLOG) corrective regimen sliding scale   SubCutaneous three times a day before meals  isosorbide   dinitrate Tablet (ISORDIL) 40 milliGRAM(s) Oral every 8 hours  melatonin 3 milliGRAM(s) Oral at bedtime  potassium chloride    Tablet ER 40 milliEquivalent(s) Oral once  senna 2 Tablet(s) Oral at bedtime  sodium chloride 0.9% lock flush 10 milliLiter(s) IV Push every 1 hour PRN  spironolactone 25 milliGRAM(s) Oral daily  valproate sodium IVPB 125 milliGRAM(s) IV Intermittent every 8 hours PRN      PHYSICAL EXAM:  GENERAL: NAD,   EYES: conjunctiva and sclera clear  ENMT: Moist mucous membranes  NECK: Supple, No JVD, Normal thyroid  NERVOUS SYSTEM:  Alert & Oriented X1-2,   CHEST/LUNG: Clear to auscultation bilaterally; No rales, rhonchi, wheezing, or rubs  HEART: Regular rate and rhythm; No murmurs, rubs, or gallops  ABDOMEN: Soft, Nontender, Nondistended; Bowel sounds present  EXTREMITIES:  2+ edema  LYMPH: No lymphadenopathy noted  SKIN: No rashes or lesions    Consultant(s) Notes Reviewed:  [x ] YES  [ ] NO  Care Discussed with Consultants/Other Providers [ x] YES  [ ] NO    LABS:    05-20    135  |  87<L>  |  48<H>  ----------------------------<  100<H>  3.4<L>   |  33<H>  |  1.83<H>    Ca    9.3      20 May 2019 06:52          CAPILLARY BLOOD GLUCOSE      POCT Blood Glucose.: 131 mg/dL (20 May 2019 13:14)  POCT Blood Glucose.: 108 mg/dL (20 May 2019 09:25)  POCT Blood Glucose.: 152 mg/dL (19 May 2019 21:17)  POCT Blood Glucose.: 185 mg/dL (19 May 2019 18:04)  POCT Blood Glucose.: 122 mg/dL (19 May 2019 13:29)            RADIOLOGY & ADDITIONAL TESTS:    Imaging Personally Reviewed:  [x ] YES  [ ] NO

## 2019-05-20 NOTE — PROGRESS NOTE ADULT - ASSESSMENT
69 year old man with prior NHL (treated with R-CHOP 2004 and BR in 2010), chronic HFrEF (likely 2/2 Doxorubicin, EF:45%, decreased RVSF) who was sent to the ED on 5/8 with AMS and heart block - he progressed to complete heart block with encephalopathy requiring intubation and a leadless pacemaker with severe MR and CHF with STEPHEN on ckd III     1- STEPHEN on ckd III  2- CHF  3- HTN      creatinine steady  hydralazine 100 mg tid   hypokalemia replete kcl   check mag   cont lasix 80 mg ivp and aldactone 25mg qd  hx gout cont allopurinol 300 mg daily

## 2019-05-20 NOTE — PROGRESS NOTE ADULT - SUBJECTIVE AND OBJECTIVE BOX
[facility]   [phone]  RENAL FOLLOW UP NOTE  --------------------------------------------------------------------------------  Chief Complaint:    24 hour events/subjective:    seen earlier.   more responsive   denies sob     PAST HISTORY  --------------------------------------------------------------------------------  No significant changes to PMH, PSH, FHx, SHx, unless otherwise noted    ALLERGIES & MEDICATIONS  --------------------------------------------------------------------------------  Allergies    No Known Allergies    Intolerances      Standing Inpatient Medications  allopurinol 300 milliGRAM(s) Oral daily  aspirin  chewable 81 milliGRAM(s) Oral daily  carvedilol 12.5 milliGRAM(s) Oral every 12 hours  dextrose 5%. 1000 milliLiter(s) IV Continuous <Continuous>  dextrose 50% Injectable 12.5 Gram(s) IV Push once  diVALproex  milliGRAM(s) Oral two times a day  docusate sodium 100 milliGRAM(s) Oral three times a day  folic acid 1 milliGRAM(s) Oral daily  furosemide   Injectable 80 milliGRAM(s) IV Push daily  heparin  Injectable 5000 Unit(s) SubCutaneous every 8 hours  hydrALAZINE 100 milliGRAM(s) Oral every 8 hours  insulin lispro (HumaLOG) corrective regimen sliding scale   SubCutaneous three times a day before meals  isosorbide   dinitrate Tablet (ISORDIL) 40 milliGRAM(s) Oral every 8 hours  melatonin 3 milliGRAM(s) Oral at bedtime  senna 2 Tablet(s) Oral at bedtime  spironolactone 25 milliGRAM(s) Oral daily    PRN Inpatient Medications  ALBUTerol/ipratropium for Nebulization 3 milliLiter(s) Nebulizer every 6 hours PRN  dextrose 40% Gel 15 Gram(s) Oral once PRN  glucagon  Injectable 1 milliGRAM(s) IntraMuscular once PRN  sodium chloride 0.9% lock flush 10 milliLiter(s) IV Push every 1 hour PRN  valproate sodium IVPB 125 milliGRAM(s) IV Intermittent every 8 hours PRN      REVIEW OF SYSTEMS  --------------------------------------------------------------------------------    Gen: denies fevers/chills,  CVS: denies chest pain/palpitations  Resp: denies SOB/Cough  GI: Denies N/V/Abd pain  : Denies dysuria/oliguria/hematuria    All other systems were reviewed and are negative, except as noted.    VITALS/PHYSICAL EXAM  --------------------------------------------------------------------------------  T(C): 36.7 (05-20-19 @ 04:18), Max: 36.7 (05-19-19 @ 21:28)  HR: 58 (05-20-19 @ 04:18) (58 - 59)  BP: 108/68 (05-20-19 @ 04:18) (108/68 - 133/66)  RR: 18 (05-20-19 @ 04:18) (17 - 18)  SpO2: 95% (05-20-19 @ 04:18) (94% - 95%)  Wt(kg): --        05-19-19 @ 07:01  -  05-20-19 @ 07:00  --------------------------------------------------------  IN: 420 mL / OUT: 800 mL / NET: -380 mL    05-20-19 @ 07:01  -  05-20-19 @ 17:55  --------------------------------------------------------  IN: 1000 mL / OUT: 0 mL / NET: 1000 mL      Physical Exam:  	  Gen: Non toxic comfortable appearing   	no jvd   	Pulm: decrease bs  no rales or ronchi or wheezing  	CV: RRR, S1S2; no rub  	Abd: +BS, soft, nontender/nondistended  	: No suprapubic tenderness  	UE: Warm, no cyanosis  no clubbing,  no edema; no asterixis  	LE: Warm, no cyanosis  no clubbing, 1- 2-  edema  	        LABS/STUDIES  --------------------------------------------------------------------------------    135  |  87  |  48  ----------------------------<  100      [05-20-19 @ 06:52]  3.4   |  33  |  1.83        Ca     9.3     [05-20-19 @ 06:52]            Creatinine Trend:  SCr 1.83 [05-20 @ 06:52]  SCr 1.87 [05-19 @ 06:13]  SCr 1.75 [05-18 @ 07:11]  SCr 1.83 [05-17 @ 07:06]  SCr 1.53 [05-16 @ 07:24]              Urinalysis - [05-10-19 @ 00:35]      Color Light Yellow / Appearance Clear / SG 1.012 / pH 6.0      Gluc Negative / Ketone Negative  / Bili Negative / Urobili Negative       Blood Moderate / Protein 30 mg/dL / Leuk Est Small / Nitrite Negative      RBC 1 / WBC 1 / Hyaline  / Gran  / Sq Epi  / Non Sq Epi 1 / Bacteria       Iron 27, TIBC 311, %sat 9      [05-08-19 @ 15:41]  HbA1c 6.0      [05-09-19 @ 10:52]  TSH 3.07      [05-17-19 @ 09:16]    Syphilis Screen (Treponema Pallidum Ab) Negative      [05-08-19 @ 15:39]

## 2019-05-20 NOTE — CHART NOTE - NSCHARTNOTEFT_GEN_A_CORE
Called to bedside by RN to speak to daughter.   Daughter concerned about pt receiving depakote, if it is needed, because while she is speaking to her dad, pt is coherent with periods of forgetfulness.   Also concerned about pt's dispo and her mom who is pt's wife lives in [place]. Pt and his wife are  but have been  for many years per pt.   Explained plan of care and provided emotional support.  Will notify  and .    Cecy Clark ANP-BC  [id number]

## 2019-05-20 NOTE — PROGRESS NOTE ADULT - ASSESSMENT
68 year old Non-Hodgkin's Lymphoma tx with chemotherapy in 2004, DM2 with CKD stage 3, HTN, CHF EF 35% on cardiac cath 12/ 2016, pleural effusion s/p left pleuracentesis in 10/2016, cardiomyopathy, gout and HLD presents to the ED sent in from cardiologists office because of AMS    # Respi failure/apnea/hypoxia-  sp Extubated  CxR-unchanged rt loculated small effusion, mild interstitial edema    # CHB sp micra ppm implant 5/10    #Acute encephalopathy. : tahmina underlying dementia  chronic parietal infarct in CT head   MR brain reviewed  , neuro cs fu  vitb12, folate, iron panel, rpr and tsh levels wnl  unlikley leptomeningeal involvement  given no evidence of lymphoma ..   appreciate onco input :::  pt had ct of the abdomen and pelvis and chest and the nodes in the chest appear inconsequential. The spleen is 15.9 cm minimally enlarged and liver is read and slightly enlarged with normal liver function studies. Considering the history I do not think these findings warrant a search for a lymphoma.    # Fevers- afebrile  UA neg, CXR neg, bld cx ngtd  observe off abx       # acute on chronic  systolic heart failure.    - TTE EF 45%, likely severe MR, moderately enlarged RV , severe pulmonary hypertension. Estimated PASP = 75 mmHg.  - diuresis per HF team ..monitor Cr   - f/u with surgery         # Non-Hodgkin lymphoma of lymph nodes of neck, unspecified non-Hodgkin lymphoma type.  Plan: - treated with chemotherapy in 2014.   Anemia-monitor h/h  as above     # Essential hypertension.  Plan: - bp stable  - off  arb and beta blocker.     # Type 2 diabetes mellitus with chronic kidney disease, without long-term current use of insulin, unspecified CKD stage. Plan: - fs achs  - fs controlled  - start sliding scale insulin      # Chronic gout without tophus, unspecified cause, unspecified site.   - c/w allopurinol 300 mg daily.   \    STEPHEN :  f/.u with renal ... off ARB

## 2019-05-20 NOTE — PROGRESS NOTE ADULT - SUBJECTIVE AND OBJECTIVE BOX
Patient is a 68y old  Male who presents with a chief complaint of confusion (08 May 2019 12:11)      HPI:  ** Patient poor historian **    Patient is a 68 year old Non-Hodgkin's Lymphoma tx with chemotherapy in , DM2 with CKD stage 3, HTN, CHF EF 35% on cardiac cath 2016, pleural effusion s/p left pleuracentesis in 10/2016, cardiomyopathy, gout and HLD presents to the ED sent in from cardiologists office because of EKG changes. Patient is not sure why he is here. He was not sure where he was, why he is here or what even the year is. He remarks that he retired a few months ago and he stopped taking his medications then because he "wasn't feeling good". He states he was wife his wife earlier but then later he states shes in [place]. Patient currently denies chest pain, shortness of breath, palpitations, syncope, fevers, chills, recent travel or sick contacts. No new meds however he has stopped taking most of his meds. He overall, feels fine and is not sure why he is in the hospital.     It's very difficult to get a clear history from patient. I have tried to reach his daughter but have not received a call back as of yet. I have called [facility] patients pharmacy and he states patient picked up furosemide and irbersartan in April but has not picked up any other meds in months. Mentation in  AAOX3.    In the ED, VSS. Labs at baseline, CT head with old stroke, Trops elevated and peaked at 50, now normal, EKG with TWI in lateral leads. (08 May 2019 10:33)    5 as the above record indicates, the pt is here and is a very poor historian. I was asked by Dr Hawkins from oncology and by the pt's internist to see the pt as he did have a past of lymphoma. The oncology office will have to look up records as his history is not readily available. When I came to see the pt he was not able to supply much info about his cancer other than he had lymphoma and did not know the type or who treated him. Dr Hawkins thinks he may have been treated by his previous associate, Dr Sarmiento.     At the time of my visit the pt was alert and oriented but was not able to give specific details about any of his medical issues. His chemistries appeared all unremarkable and he was not febrile and his ct of the head showed nothing of note.  I will see if there are any records on his previous onc history and see if there is any reason to think it may have contributed to his present admit here.  events of last day was noted and pt was intubated and had heart failure. The records were reviewed at [facility] and pt in  had a large cell lymphoma and was treated with R CHOP. In  he went to [facility] and was treated for a recurrence with BR and he has remained in remission. 5/10 still intubated and sedated and labs reviewed and thus far no direct evidence for lymphoma recurrence.  Pt still intubated and sedated. Anemia from icu anemia causes no evidence for recurrent lymphoma.  the pt was extubated on this date and hemoglobin was noted to be 11.0 will look when stable for any evidenced of recurrent lymphoma.  notes reviewed and pt still looked somewhat disoriented will check him for nodes in the am counts ok.  stable but confused ands seen by neuro who feels confusion is from the cardiac problems. Pt to be evaluated for MV repair. 5/15 very poor memory and cognitive decline in this pt as he could not remember 4 objects one minute later. He has good right sided mental abilities and good spacial ability. We do not know how long or fast this cognitive decline has been in play. Will have to try to reach out to family members, he cannot recall being treated for his lymphoma. Ct head without contrast. Renal function is poor, so that we are limited with ct of the brain and body without contrast. ? multiinfarct dementia neuro cognitive studies check B12 and tsh levels.  pt is stable and neuro evaluation proceeding with scans of the head.  pt had ct of the abdomen and pelvis and chest and the nodes in the chest appear inconsequential. The spleen is 15.9 cm minimally enlarged and liver is read and slightly enlarged with normal liver function studies. Considering the history I do not think these findings warrant a search for a lymphoma.  pt was stable and workup continues and decisions on heart pending.  MEDICATIONS  (STANDING):  allopurinol 300 milliGRAM(s) Oral daily  aspirin  chewable 81 milliGRAM(s) Oral daily  carvedilol 12.5 milliGRAM(s) Oral every 12 hours  dextrose 5%. 1000 milliLiter(s) (50 mL/Hr) IV Continuous <Continuous>  dextrose 50% Injectable 12.5 Gram(s) IV Push once  diVALproex  milliGRAM(s) Oral two times a day  docusate sodium 100 milliGRAM(s) Oral three times a day  folic acid 1 milliGRAM(s) Oral daily  furosemide   Injectable 80 milliGRAM(s) IV Push daily  heparin  Injectable 5000 Unit(s) SubCutaneous every 8 hours  hydrALAZINE 100 milliGRAM(s) Oral every 8 hours  insulin lispro (HumaLOG) corrective regimen sliding scale   SubCutaneous three times a day before meals  isosorbide   dinitrate Tablet (ISORDIL) 40 milliGRAM(s) Oral every 8 hours  melatonin 3 milliGRAM(s) Oral at bedtime  senna 2 Tablet(s) Oral at bedtime  spironolactone 25 milliGRAM(s) Oral daily      142  |  99  |  28<H>  ----------------------------<  90  3.5   |  30  |  1.83<H>    Ca    9.3      17 May 2019 07:06      Urinalysis Basic - ( 08 May 2019 00:17 )    Color: Yellow / Appearance: Slightly Turbid / S.023 / pH: x  Gluc: x / Ketone: Negative  / Bili: Small / Urobili: 3 mg/dL   Blood: x / Protein: 300 mg/dL / Nitrite: Negative   Leuk Esterase: Negative / RBC: 2 /hpf / WBC 7 /HPF   Sq Epi: x / Non Sq Epi: 1 /hpf / Bacteria: Negative        ROS:  Negative except for:    PAST MEDICAL & SURGICAL HISTORY:  Pleural effusion  Moderate mitral regurgitation  Systolic heart failure  Rhinitis, allergic  Essential hypertension  DM type 2 (diabetes mellitus, type 2)  Non-Hodgkins Lymphoma  Non-Hodgkin lymphoma: h/o axillary dissection      SOCIAL HISTORY:    FAMILY HISTORY:  Family history of non-Hodgkin's lymphoma (Sibling)  Family history of CHF (congestive heart failure)      Allergies    No Known Allergies    Intolerances        PHYSICAL EXAM  General: lying in the bed and not in distress  HEENT: stable   Neck: supple  CV: rr   Lungs: decreased breath sounds at the bases  Abdomen: soft non-tender non-distended, no hepatosplenomegaly  Ext: no clubbing cyanosis or edema  Skin: no rashes and no petechiae  Neuro: alert and oriented X3 no focal deficits    BLOOD SMEAR INTERPRETATION:    RADIOLOGY :

## 2019-05-21 LAB
ANION GAP SERPL CALC-SCNC: 13 MMOL/L — SIGNIFICANT CHANGE UP (ref 5–17)
BUN SERPL-MCNC: 52 MG/DL — HIGH (ref 7–23)
CALCIUM SERPL-MCNC: 9.2 MG/DL — SIGNIFICANT CHANGE UP (ref 8.4–10.5)
CHLORIDE SERPL-SCNC: 91 MMOL/L — LOW (ref 96–108)
CO2 SERPL-SCNC: 33 MMOL/L — HIGH (ref 22–31)
CREAT SERPL-MCNC: 1.83 MG/DL — HIGH (ref 0.5–1.3)
GLUCOSE BLDC GLUCOMTR-MCNC: 103 MG/DL — HIGH (ref 70–99)
GLUCOSE BLDC GLUCOMTR-MCNC: 121 MG/DL — HIGH (ref 70–99)
GLUCOSE BLDC GLUCOMTR-MCNC: 143 MG/DL — HIGH (ref 70–99)
GLUCOSE BLDC GLUCOMTR-MCNC: 153 MG/DL — HIGH (ref 70–99)
GLUCOSE SERPL-MCNC: 93 MG/DL — SIGNIFICANT CHANGE UP (ref 70–99)
MAGNESIUM SERPL-MCNC: 2.4 MG/DL — SIGNIFICANT CHANGE UP (ref 1.6–2.6)
POTASSIUM SERPL-MCNC: 3.9 MMOL/L — SIGNIFICANT CHANGE UP (ref 3.5–5.3)
POTASSIUM SERPL-SCNC: 3.9 MMOL/L — SIGNIFICANT CHANGE UP (ref 3.5–5.3)
SODIUM SERPL-SCNC: 137 MMOL/L — SIGNIFICANT CHANGE UP (ref 135–145)

## 2019-05-21 PROCEDURE — 99232 SBSQ HOSP IP/OBS MODERATE 35: CPT

## 2019-05-21 PROCEDURE — 93010 ELECTROCARDIOGRAM REPORT: CPT

## 2019-05-21 RX ORDER — FUROSEMIDE 40 MG
80 TABLET ORAL DAILY
Refills: 0 | Status: DISCONTINUED | OUTPATIENT
Start: 2019-05-22 | End: 2019-05-24

## 2019-05-21 RX ADMIN — Medication 100 MILLIGRAM(S): at 05:36

## 2019-05-21 RX ADMIN — Medication 300 MILLIGRAM(S): at 12:29

## 2019-05-21 RX ADMIN — Medication 100 MILLIGRAM(S): at 21:40

## 2019-05-21 RX ADMIN — Medication 100 MILLIGRAM(S): at 05:35

## 2019-05-21 RX ADMIN — ISOSORBIDE DINITRATE 40 MILLIGRAM(S): 5 TABLET ORAL at 05:35

## 2019-05-21 RX ADMIN — HEPARIN SODIUM 5000 UNIT(S): 5000 INJECTION INTRAVENOUS; SUBCUTANEOUS at 12:29

## 2019-05-21 RX ADMIN — Medication 3 MILLIGRAM(S): at 21:39

## 2019-05-21 RX ADMIN — SENNA PLUS 2 TABLET(S): 8.6 TABLET ORAL at 21:40

## 2019-05-21 RX ADMIN — CARVEDILOL PHOSPHATE 12.5 MILLIGRAM(S): 80 CAPSULE, EXTENDED RELEASE ORAL at 21:40

## 2019-05-21 RX ADMIN — HEPARIN SODIUM 5000 UNIT(S): 5000 INJECTION INTRAVENOUS; SUBCUTANEOUS at 21:40

## 2019-05-21 RX ADMIN — Medication 1 MILLIGRAM(S): at 12:28

## 2019-05-21 RX ADMIN — HEPARIN SODIUM 5000 UNIT(S): 5000 INJECTION INTRAVENOUS; SUBCUTANEOUS at 05:35

## 2019-05-21 RX ADMIN — DIVALPROEX SODIUM 250 MILLIGRAM(S): 500 TABLET, DELAYED RELEASE ORAL at 18:23

## 2019-05-21 RX ADMIN — Medication 81 MILLIGRAM(S): at 12:28

## 2019-05-21 RX ADMIN — ISOSORBIDE DINITRATE 40 MILLIGRAM(S): 5 TABLET ORAL at 12:28

## 2019-05-21 RX ADMIN — CARVEDILOL PHOSPHATE 12.5 MILLIGRAM(S): 80 CAPSULE, EXTENDED RELEASE ORAL at 09:26

## 2019-05-21 RX ADMIN — Medication 100 MILLIGRAM(S): at 12:28

## 2019-05-21 RX ADMIN — DIVALPROEX SODIUM 250 MILLIGRAM(S): 500 TABLET, DELAYED RELEASE ORAL at 05:36

## 2019-05-21 RX ADMIN — SPIRONOLACTONE 25 MILLIGRAM(S): 25 TABLET, FILM COATED ORAL at 05:35

## 2019-05-21 RX ADMIN — Medication 80 MILLIGRAM(S): at 05:35

## 2019-05-21 RX ADMIN — ISOSORBIDE DINITRATE 40 MILLIGRAM(S): 5 TABLET ORAL at 21:40

## 2019-05-21 NOTE — PROGRESS NOTE BEHAVIORAL HEALTH - NSBHFUPINTERVALHXFT_PSY_A_CORE
Patient seen and evaluated, staff consulted, chart, labs, meds reviewed. Patient and staff report no issues overnight. Patient states there are notable improvements in clarity of sensorium and orientation. No SI/HI, no delusions or perceptual disturbances, no prominent psych. sx. noted or reported. Patient is oriented to self, location, time.     Direction of care discussed with the patient/family. Counseling and support provided.  As per team, patient did represent a management problem overnight at one point, was not given PRN.

## 2019-05-21 NOTE — PROGRESS NOTE ADULT - SUBJECTIVE AND OBJECTIVE BOX
Patient is a 68y old  Male who presents with a chief complaint of confusion (08 May 2019 12:11)      HPI:  ** Patient poor historian **    Patient is a 68 year old Non-Hodgkin's Lymphoma tx with chemotherapy in , DM2 with CKD stage 3, HTN, CHF EF 35% on cardiac cath 2016, pleural effusion s/p left pleuracentesis in 10/2016, cardiomyopathy, gout and HLD presents to the ED sent in from cardiologists office because of EKG changes. Patient is not sure why he is here. He was not sure where he was, why he is here or what even the year is. He remarks that he retired a few months ago and he stopped taking his medications then because he "wasn't feeling good". He states he was wife his wife earlier but then later he states shes in [place]. Patient currently denies chest pain, shortness of breath, palpitations, syncope, fevers, chills, recent travel or sick contacts. No new meds however he has stopped taking most of his meds. He overall, feels fine and is not sure why he is in the hospital.     It's very difficult to get a clear history from patient. I have tried to reach his daughter but have not received a call back as of yet. I have called [facility] patients pharmacy and he states patient picked up furosemide and irbersartan in April but has not picked up any other meds in months. Mentation in  AAOX3.    In the ED, VSS. Labs at baseline, CT head with old stroke, Trops elevated and peaked at 50, now normal, EKG with TWI in lateral leads. (08 May 2019 10:33)    5 as the above record indicates, the pt is here and is a very poor historian. I was asked by Dr Hawkins from oncology and by the pt's internist to see the pt as he did have a past of lymphoma. The oncology office will have to look up records as his history is not readily available. When I came to see the pt he was not able to supply much info about his cancer other than he had lymphoma and did not know the type or who treated him. Dr Hawkins thinks he may have been treated by his previous associate, Dr Sarmiento.     At the time of my visit the pt was alert and oriented but was not able to give specific details about any of his medical issues. His chemistries appeared all unremarkable and he was not febrile and his ct of the head showed nothing of note.  I will see if there are any records on his previous onc history and see if there is any reason to think it may have contributed to his present admit here.  events of last day was noted and pt was intubated and had heart failure. The records were reviewed at [facility] and pt in  had a large cell lymphoma and was treated with R CHOP. In  he went to [facility] and was treated for a recurrence with BR and he has remained in remission. 5/10 still intubated and sedated and labs reviewed and thus far no direct evidence for lymphoma recurrence.  Pt still intubated and sedated. Anemia from icu anemia causes no evidence for recurrent lymphoma.  the pt was extubated on this date and hemoglobin was noted to be 11.0 will look when stable for any evidenced of recurrent lymphoma.  notes reviewed and pt still looked somewhat disoriented will check him for nodes in the am counts ok.  stable but confused ands seen by neuro who feels confusion is from the cardiac problems. Pt to be evaluated for MV repair. 5/15 very poor memory and cognitive decline in this pt as he could not remember 4 objects one minute later. He has good right sided mental abilities and good spacial ability. We do not know how long or fast this cognitive decline has been in play. Will have to try to reach out to family members, he cannot recall being treated for his lymphoma. Ct head without contrast. Renal function is poor, so that we are limited with ct of the brain and body without contrast. ? multiinfarct dementia neuro cognitive studies check B12 and tsh levels.  pt is stable and neuro evaluation proceeding with scans of the head.  pt had ct of the abdomen and pelvis and chest and the nodes in the chest appear inconsequential. The spleen is 15.9 cm minimally enlarged and liver is read and slightly enlarged with normal liver function studies. Considering the history I do not think these findings warrant a search for a lymphoma.  pt was stable and workup continues and decisions on heart pending.  stable and notes reviewed in detail labs noted and decisions on heart procedures are pending.  MEDICATIONS  (STANDING):  allopurinol 300 milliGRAM(s) Oral daily  aspirin  chewable 81 milliGRAM(s) Oral daily  carvedilol 12.5 milliGRAM(s) Oral every 12 hours  dextrose 5%. 1000 milliLiter(s) (50 mL/Hr) IV Continuous <Continuous>  dextrose 50% Injectable 12.5 Gram(s) IV Push once  diVALproex  milliGRAM(s) Oral two times a day  docusate sodium 100 milliGRAM(s) Oral three times a day  folic acid 1 milliGRAM(s) Oral daily  furosemide   Injectable 80 milliGRAM(s) IV Push daily  heparin  Injectable 5000 Unit(s) SubCutaneous every 8 hours  hydrALAZINE 100 milliGRAM(s) Oral every 8 hours  insulin lispro (HumaLOG) corrective regimen sliding scale   SubCutaneous three times a day before meals  isosorbide   dinitrate Tablet (ISORDIL) 40 milliGRAM(s) Oral every 8 hours  melatonin 3 milliGRAM(s) Oral at bedtime  senna 2 Tablet(s) Oral at bedtime  spironolactone 25 milliGRAM(s) Oral daily        142  |  99  |  28<H>  ----------------------------<  90  3.5   |  30  |  1.83<H>    Ca    9.3      17 May 2019 07:06      Urinalysis Basic - ( 08 May 2019 00:17 )    Color: Yellow / Appearance: Slightly Turbid / S.023 / pH: x  Gluc: x / Ketone: Negative  / Bili: Small / Urobili: 3 mg/dL   Blood: x / Protein: 300 mg/dL / Nitrite: Negative   Leuk Esterase: Negative / RBC: 2 /hpf / WBC 7 /HPF   Sq Epi: x / Non Sq Epi: 1 /hpf / Bacteria: Negative        ROS:  Negative except for:    PAST MEDICAL & SURGICAL HISTORY:  Pleural effusion  Moderate mitral regurgitation  Systolic heart failure  Rhinitis, allergic  Essential hypertension  DM type 2 (diabetes mellitus, type 2)  Non-Hodgkins Lymphoma  Non-Hodgkin lymphoma: h/o axillary dissection      SOCIAL HISTORY:    FAMILY HISTORY:  Family history of non-Hodgkin's lymphoma (Sibling)  Family history of CHF (congestive heart failure)      Allergies    No Known Allergies    Intolerances        PHYSICAL EXAM  General: lying in the bed and not in distress  HEENT: stable   Neck: supple  CV: rr   Lungs: decreased breath sounds at the bases  Abdomen: soft non-tender non-distended, no hepatosplenomegaly  Ext: no clubbing cyanosis or edema  Skin: no rashes and no petechiae  Neuro: alert and oriented X3 no focal deficits    BLOOD SMEAR INTERPRETATION:    RADIOLOGY :

## 2019-05-21 NOTE — PROGRESS NOTE BEHAVIORAL HEALTH - NSBHCONSULTFOLLOWAFTERCARE_PSY_A_CORE FT
As per team. Patient can be referred to [facility] Geriatric outpatient service [phone] or with a behavioral health provider at his residence.

## 2019-05-21 NOTE — PROGRESS NOTE ADULT - ASSESSMENT
69 year old man with prior NHL (treated with R-CHOP 2004 and BR in 2010), chronic HFrEF (likely 2/2 Doxorubicin, EF:45%, decreased RVSF) who was sent to the ED on 5/8 with AMS and heart block - he progressed to complete heart block with encephalopathy requiring intubation and a leadless pacemaker with severe MR and CHF with STEPHEN on ckd III     1- STEPHEN on ckd III  2- CHF  3- HTN      creatinine steady  hydralazine 100 mg tid   change lasix to 80 mg po daily and aldactone 25mg qd  hx gout cont allopurinol 300 mg daily

## 2019-05-21 NOTE — PROGRESS NOTE BEHAVIORAL HEALTH - NSBHCHARTREVIEWVS_PSY_A_CORE FT
Vital Signs Last 24 Hrs  T(C): 36.5 (21 May 2019 11:49), Max: 37.1 (20 May 2019 20:59)  T(F): 97.7 (21 May 2019 11:49), Max: 98.7 (20 May 2019 20:59)  HR: 59 (21 May 2019 11:49) (57 - 62)  BP: 126/60 (21 May 2019 11:49) (121/65 - 136/55)  BP(mean): --  RR: 18 (21 May 2019 11:49) (18 - 18)  SpO2: 94% (21 May 2019 11:49) (94% - 97%)  T(C): 36.5 (05-21-19 @ 11:49), Max: 37.1 (05-20-19 @ 20:59)  HR: 59 (05-21-19 @ 11:49) (57 - 62)  BP: 126/60 (05-21-19 @ 11:49) (121/65 - 136/55)  RR: 18 (05-21-19 @ 11:49) (18 - 18)  SpO2: 94% (05-21-19 @ 11:49) (94% - 97%)  Wt(kg): --

## 2019-05-21 NOTE — PROGRESS NOTE ADULT - SUBJECTIVE AND OBJECTIVE BOX
Patient is a 69y old  Male who presents with a chief complaint of confusion (21 May 2019 13:48)      INTERVAL HPI/OVERNIGHT EVENTS: noted, denies any cp/sob  remains confused but improved       Vital Signs Last 24 Hrs  T(C): 36.5 (21 May 2019 11:49), Max: 37.1 (20 May 2019 20:59)  T(F): 97.7 (21 May 2019 11:49), Max: 98.7 (20 May 2019 20:59)  HR: 59 (21 May 2019 11:49) (57 - 62)  BP: 126/60 (21 May 2019 11:49) (121/65 - 136/55)  BP(mean): --  RR: 18 (21 May 2019 11:49) (18 - 18)  SpO2: 94% (21 May 2019 11:49) (94% - 97%)    ALBUTerol/ipratropium for Nebulization 3 milliLiter(s) Nebulizer every 6 hours PRN  allopurinol 300 milliGRAM(s) Oral daily  aspirin  chewable 81 milliGRAM(s) Oral daily  carvedilol 12.5 milliGRAM(s) Oral every 12 hours  dextrose 40% Gel 15 Gram(s) Oral once PRN  dextrose 5%. 1000 milliLiter(s) IV Continuous <Continuous>  dextrose 50% Injectable 12.5 Gram(s) IV Push once  diVALproex  milliGRAM(s) Oral two times a day  docusate sodium 100 milliGRAM(s) Oral three times a day  folic acid 1 milliGRAM(s) Oral daily  furosemide   Injectable 80 milliGRAM(s) IV Push daily  glucagon  Injectable 1 milliGRAM(s) IntraMuscular once PRN  heparin  Injectable 5000 Unit(s) SubCutaneous every 8 hours  hydrALAZINE 100 milliGRAM(s) Oral every 8 hours  insulin lispro (HumaLOG) corrective regimen sliding scale   SubCutaneous three times a day before meals  isosorbide   dinitrate Tablet (ISORDIL) 40 milliGRAM(s) Oral every 8 hours  melatonin 3 milliGRAM(s) Oral at bedtime  senna 2 Tablet(s) Oral at bedtime  sodium chloride 0.9% lock flush 10 milliLiter(s) IV Push every 1 hour PRN  spironolactone 25 milliGRAM(s) Oral daily  valproate sodium IVPB 125 milliGRAM(s) IV Intermittent every 8 hours PRN      PHYSICAL EXAM:  GENERAL: NAD,   EYES: conjunctiva and sclera clear  ENMT: Moist mucous membranes  NECK: Supple, No JVD, Normal thyroid  NERVOUS SYSTEM:  Alert & Oriented X1,   CHEST/LUNG: Clear to auscultation bilaterally; No rales, rhonchi, wheezing, or rubs  HEART: Regular rate and rhythm; No murmurs, rubs, or gallops  ABDOMEN: Soft, Nontender, Nondistended; Bowel sounds present  EXTREMITIES:  2+ Peripheral Pulses, No clubbing, cyanosis, or edema  LYMPH: No lymphadenopathy noted  SKIN: No rashes or lesions    Consultant(s) Notes Reviewed:  [x ] YES  [ ] NO  Care Discussed with Consultants/Other Providers [ x] YES  [ ] NO    LABS:    05-21    137  |  91<L>  |  52<H>  ----------------------------<  93  3.9   |  33<H>  |  1.83<H>    Ca    9.2      21 May 2019 07:05  Mg     2.4     05-21          CAPILLARY BLOOD GLUCOSE      POCT Blood Glucose.: 143 mg/dL (21 May 2019 12:47)  POCT Blood Glucose.: 121 mg/dL (21 May 2019 08:42)  POCT Blood Glucose.: 242 mg/dL (20 May 2019 21:53)  POCT Blood Glucose.: 126 mg/dL (20 May 2019 18:14)            RADIOLOGY & ADDITIONAL TESTS:    Imaging Personally Reviewed:  [x ] YES  [ ] NO

## 2019-05-21 NOTE — PROGRESS NOTE BEHAVIORAL HEALTH - SUMMARY
This is a 68-year-old CM patient,  but domiciled alone, retired  and worked in construction, with no known PPh and PMH Non-Hodgkin's Lymphoma tx with chemotherapy in 2004, DM2 with CKD stage 3, HTN, CHF EF 35% on cardiac cath 12/ 2016, pleural effusion s/p left pleuracentesis in 10/2016, cardiomyopathy, gout and HLD presents to the ED sent in from cardiologists office because of EKG changes. Psychiatry consulted to assess for confusion, altered mental status, and agitation with concern for vascular dementia.    Patient presents with cognitive impairment and confusion that represent a marked departure from his baseline, fluctuating course indicates delirium, possibly superimposed on vascular dementia. Notable improvement in delirium seen on follow up.

## 2019-05-21 NOTE — PROGRESS NOTE ADULT - ASSESSMENT
1. Confusion delirium etiology unclear  2. Severe pulmonary hypertension  3 Mitral regurgitation  4. Moderate LV dysfunction  5. Complete heart block status post pacemaker    ecommendations  Continue present regimen of medications but may need to cut back on hydralazine if blood pressure remains low  Structural heart disease /CT surgical eval appreciated  continued physical therapy  Discharge planning to rehabilitation with follow-up with surgery/structural heart disease

## 2019-05-21 NOTE — PROGRESS NOTE ADULT - ASSESSMENT
5/8 as the above record indicates, the pt is here and is a very poor historian. I was asked by Dr Hawkins from oncology and by the pt's internist to see the pt as he did have a past of lymphoma. The oncology office will have to look up records as his history is not readily available. When I came to see the pt he was not able to supply much info about his cancer other than he had lymphoma and did not know the type or who treated him. Dr Hawkins thinks he may have been treated by his previous associate, Dr Sarmiento.     At the time of my visit the pt was alert and oriented but was not able to give specific details about any of his medical issues. His chemistries appeared all unremarkable and he was not febrile and his ct of the head showed nothing of note.  I will see if there are any records on his previous onc history and see if there is any reason to think it may have contributed to his present admit here.     5/9 events of last day was noted and pt was intubated and had heart failure. 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 In 2010 he went to [facility] and was treated for a recurrence with BR and he has remained in remission.   5/10 still intubated and sedated and labs reviewed and thus far no direct evidence for lymphoma recurrence.  . 5/11 Pt still intubated and sedated. Anemia from icu anemia causes no evidence for recurrent lymphoma.   5/12 the pt was extubated on this date and hemoglobin was noted to be 11.0 will look when stable for any evidenced of recurrent lymphoma.   5/13 notes reviewed and pt still looked somewhat disoriented will check him for nodes in the am counts ok.  . 5/14 stable but confused ands seen by neuro who feels confusion is from the cardiac problems. Pt to be evaluated for MV repair.   5/15 very poor memory and cognitive decline in this pt as he could not remember 4 objects one minute later. He has good right sided mental abilities and good spacial ability. We do not know how long or fast this cognitive decline has been in play. Will have to try to reach out to family members, he cannot recall being treated for his lymphoma. Ct head without contrast. Renal function is poor, so that we are limited with ct of the brain and body without contrast. ? multiinfarct dementia neuro cognitive studies check B12 and tsh levels. . 5/16 pt is stable and neuro evaluation proceeding with scans of the head.  5/17 pt had ct of the abdomen and pelvis and chest and the nodes in the chest appear inconsequential. The spleen is 15.9 cm minimally enlarged and liver is read and slightly enlarged with normal liver function studies. Considering the history I do not think these findings warrant a search for a lymphoma.  5/20 pt was stable and workup continues and decisions on heart pending.  5/21 stable and notes reviewed in detail labs noted and decisions on heart procedures are pending.

## 2019-05-21 NOTE — PROGRESS NOTE ADULT - SUBJECTIVE AND OBJECTIVE BOX
Subjective: Patient seen and examined. No new events except as noted.   patient appears clinically improved. His mental status appears to be improved. He is ambulating with physical therapy. He denies shortness of breathor chest pain or palpitations  MEDICATIONS:  MEDICATIONS  (STANDING):  allopurinol 300 milliGRAM(s) Oral daily  aspirin  chewable 81 milliGRAM(s) Oral daily  carvedilol 12.5 milliGRAM(s) Oral every 12 hours  dextrose 5%. 1000 milliLiter(s) (50 mL/Hr) IV Continuous <Continuous>  dextrose 50% Injectable 12.5 Gram(s) IV Push once  diVALproex  milliGRAM(s) Oral two times a day  docusate sodium 100 milliGRAM(s) Oral three times a day  folic acid 1 milliGRAM(s) Oral daily  furosemide   Injectable 80 milliGRAM(s) IV Push daily  heparin  Injectable 5000 Unit(s) SubCutaneous every 8 hours  hydrALAZINE 100 milliGRAM(s) Oral every 8 hours  insulin lispro (HumaLOG) corrective regimen sliding scale   SubCutaneous three times a day before meals  isosorbide   dinitrate Tablet (ISORDIL) 40 milliGRAM(s) Oral every 8 hours  melatonin 3 milliGRAM(s) Oral at bedtime  senna 2 Tablet(s) Oral at bedtime  spironolactone 25 milliGRAM(s) Oral daily      PHYSICAL EXAM:  T(C): 36.5 (05-21-19 @ 11:49), Max: 37.1 (05-20-19 @ 20:59)  HR: 59 (05-21-19 @ 11:49) (57 - 62)  BP: 126/60 (05-21-19 @ 11:49) (121/65 - 136/55)  RR: 18 (05-21-19 @ 11:49) (18 - 18)  SpO2: 94% (05-21-19 @ 11:49) (94% - 97%)  Wt(kg): --  I&O's Summary    20 May 2019 07:01  -  21 May 2019 07:00  --------------------------------------------------------  IN: 1360 mL / OUT: 0 mL / NET: 1360 mL    21 May 2019 07:01  -  21 May 2019 18:08  --------------------------------------------------------  IN: 360 mL / OUT: 0 mL / NET: 360 mL          Appearance: Normal	  HEENT:   Normal oral mucosa, PERRL, EOMI	  Cardiovascular: Normal S1 S2, 2/6 at apex  Respiratory: Lungs clear to auscultation, normal effort 	  Gastrointestinal:  Soft, Non-tender, + BS	  Skin: No rashes, No ecchymoses, No cyanosis, warm to touch  Musculoskeletal: Normal range of motion, normal strength  Psychiatry:  Mood & affect appropriate  Ext: 1+ edema  Peripheral pulses palpable 2+ bilaterally      LABS:    CARDIAC MARKERS:            05-21    137  |  91<L>  |  52<H>  ----------------------------<  93  3.9   |  33<H>  |  1.83<H>    Ca    9.2      21 May 2019 07:05  Mg     2.4     05-21      proBNP:   Lipid Profile:   HgA1c:   TSH:           TELEMETRY: 	    ECG:  	  RADIOLOGY:   < from: Xray Chest 1 View AP/PA (05.12.19 @ 09:40) >  mpression: Unchanged loculated fluid in the right fissure. Mild   pulmonary edema. Small left pleural effusion.      < end of copied text >

## 2019-05-21 NOTE — PROGRESS NOTE BEHAVIORAL HEALTH - NSBHCHARTREVIEWLAB_PSY_A_CORE FT
05-21    137  |  91<L>  |  52<H>  ----------------------------<  93  3.9   |  33<H>  |  1.83<H>    Ca    9.2      21 May 2019 07:05  Mg     2.4     05-21

## 2019-05-21 NOTE — PROGRESS NOTE ADULT - SUBJECTIVE AND OBJECTIVE BOX
[facility]   [phone]  RENAL FOLLOW UP NOTE  --------------------------------------------------------------------------------  Chief Complaint:    24 hour events/subjective:  seen earlier.   confused   communicative       PAST HISTORY  --------------------------------------------------------------------------------  No significant changes to PMH, PSH, FHx, SHx, unless otherwise noted    ALLERGIES & MEDICATIONS  --------------------------------------------------------------------------------  Allergies    No Known Allergies    Intolerances      Standing Inpatient Medications  allopurinol 300 milliGRAM(s) Oral daily  aspirin  chewable 81 milliGRAM(s) Oral daily  carvedilol 12.5 milliGRAM(s) Oral every 12 hours  dextrose 5%. 1000 milliLiter(s) IV Continuous <Continuous>  dextrose 50% Injectable 12.5 Gram(s) IV Push once  diVALproex  milliGRAM(s) Oral two times a day  docusate sodium 100 milliGRAM(s) Oral three times a day  folic acid 1 milliGRAM(s) Oral daily  furosemide   Injectable 80 milliGRAM(s) IV Push daily  heparin  Injectable 5000 Unit(s) SubCutaneous every 8 hours  hydrALAZINE 100 milliGRAM(s) Oral every 8 hours  insulin lispro (HumaLOG) corrective regimen sliding scale   SubCutaneous three times a day before meals  isosorbide   dinitrate Tablet (ISORDIL) 40 milliGRAM(s) Oral every 8 hours  melatonin 3 milliGRAM(s) Oral at bedtime  senna 2 Tablet(s) Oral at bedtime  spironolactone 25 milliGRAM(s) Oral daily    PRN Inpatient Medications  ALBUTerol/ipratropium for Nebulization 3 milliLiter(s) Nebulizer every 6 hours PRN  dextrose 40% Gel 15 Gram(s) Oral once PRN  glucagon  Injectable 1 milliGRAM(s) IntraMuscular once PRN  sodium chloride 0.9% lock flush 10 milliLiter(s) IV Push every 1 hour PRN  valproate sodium IVPB 125 milliGRAM(s) IV Intermittent every 8 hours PRN      REVIEW OF SYSTEMS  --------------------------------------------------------------------------------    Gen: denies fatigue, fevers/chills,  CVS: denies chest pain/palpitations  Resp: denies SOB/Cough  GI: Denies N/V/Abd pain  : Denies dysuria/oliguria/hematuria    All other systems were reviewed and are negative, except as noted.    VITALS/PHYSICAL EXAM  --------------------------------------------------------------------------------  T(C): 36.5 (05-21-19 @ 11:49), Max: 37.1 (05-20-19 @ 20:59)  HR: 59 (05-21-19 @ 11:49) (57 - 62)  BP: 126/60 (05-21-19 @ 11:49) (121/65 - 136/55)  RR: 18 (05-21-19 @ 11:49) (18 - 18)  SpO2: 94% (05-21-19 @ 11:49) (94% - 97%)  Wt(kg): --        05-20-19 @ 07:01  -  05-21-19 @ 07:00  --------------------------------------------------------  IN: 1360 mL / OUT: 0 mL / NET: 1360 mL    05-21-19 @ 07:01  -  05-21-19 @ 20:47  --------------------------------------------------------  IN: 360 mL / OUT: 0 mL / NET: 360 mL      Physical Exam:  	  Gen: Non toxic comfortable appearing   	no jvd   	Pulm: decrease bs  no rales or ronchi or wheezing  	CV: RRR, S1S2; no rub  	Abd: +BS, soft, nontender/nondistended  	: No suprapubic tenderness  	UE: Warm, no cyanosis  no clubbing,  no edema; no asterixis  	LE: Warm, no cyanosis  no clubbing, trace   edema  	      LABS/STUDIES  --------------------------------------------------------------------------------    137  |  91  |  52  ----------------------------<  93      [05-21-19 @ 07:05]  3.9   |  33  |  1.83        Ca     9.2     [05-21-19 @ 07:05]      Mg     2.4     [05-21-19 @ 07:05]            Creatinine Trend:  SCr 1.83 [05-21 @ 07:05]  SCr 1.83 [05-20 @ 06:52]  SCr 1.87 [05-19 @ 06:13]  SCr 1.75 [05-18 @ 07:11]  SCr 1.83 [05-17 @ 07:06]              Urinalysis - [05-10-19 @ 00:35]      Color Light Yellow / Appearance Clear / SG 1.012 / pH 6.0      Gluc Negative / Ketone Negative  / Bili Negative / Urobili Negative       Blood Moderate / Protein 30 mg/dL / Leuk Est Small / Nitrite Negative      RBC 1 / WBC 1 / Hyaline  / Gran  / Sq Epi  / Non Sq Epi 1 / Bacteria       Iron 27, TIBC 311, %sat 9      [05-08-19 @ 15:41]  HbA1c 6.0      [05-09-19 @ 10:52]  TSH 3.07      [05-17-19 @ 09:16]    Syphilis Screen (Treponema Pallidum Ab) Negative      [05-08-19 @ 15:39]

## 2019-05-22 LAB
GLUCOSE BLDC GLUCOMTR-MCNC: 111 MG/DL — HIGH (ref 70–99)
GLUCOSE BLDC GLUCOMTR-MCNC: 116 MG/DL — HIGH (ref 70–99)
GLUCOSE BLDC GLUCOMTR-MCNC: 140 MG/DL — HIGH (ref 70–99)
GLUCOSE BLDC GLUCOMTR-MCNC: 143 MG/DL — HIGH (ref 70–99)

## 2019-05-22 PROCEDURE — 99233 SBSQ HOSP IP/OBS HIGH 50: CPT

## 2019-05-22 RX ADMIN — Medication 100 MILLIGRAM(S): at 21:09

## 2019-05-22 RX ADMIN — Medication 300 MILLIGRAM(S): at 16:00

## 2019-05-22 RX ADMIN — Medication 100 MILLIGRAM(S): at 16:00

## 2019-05-22 RX ADMIN — DIVALPROEX SODIUM 250 MILLIGRAM(S): 500 TABLET, DELAYED RELEASE ORAL at 17:03

## 2019-05-22 RX ADMIN — SENNA PLUS 2 TABLET(S): 8.6 TABLET ORAL at 21:09

## 2019-05-22 RX ADMIN — ISOSORBIDE DINITRATE 40 MILLIGRAM(S): 5 TABLET ORAL at 21:09

## 2019-05-22 RX ADMIN — Medication 100 MILLIGRAM(S): at 16:03

## 2019-05-22 RX ADMIN — CARVEDILOL PHOSPHATE 12.5 MILLIGRAM(S): 80 CAPSULE, EXTENDED RELEASE ORAL at 21:09

## 2019-05-22 RX ADMIN — ISOSORBIDE DINITRATE 40 MILLIGRAM(S): 5 TABLET ORAL at 15:59

## 2019-05-22 RX ADMIN — Medication 100 MILLIGRAM(S): at 05:57

## 2019-05-22 RX ADMIN — Medication 81 MILLIGRAM(S): at 12:23

## 2019-05-22 RX ADMIN — Medication 1 TABLET(S): at 17:02

## 2019-05-22 RX ADMIN — ISOSORBIDE DINITRATE 40 MILLIGRAM(S): 5 TABLET ORAL at 05:57

## 2019-05-22 RX ADMIN — DIVALPROEX SODIUM 250 MILLIGRAM(S): 500 TABLET, DELAYED RELEASE ORAL at 05:57

## 2019-05-22 RX ADMIN — Medication 1 MILLIGRAM(S): at 16:00

## 2019-05-22 RX ADMIN — Medication 80 MILLIGRAM(S): at 05:57

## 2019-05-22 RX ADMIN — CARVEDILOL PHOSPHATE 12.5 MILLIGRAM(S): 80 CAPSULE, EXTENDED RELEASE ORAL at 12:23

## 2019-05-22 RX ADMIN — HEPARIN SODIUM 5000 UNIT(S): 5000 INJECTION INTRAVENOUS; SUBCUTANEOUS at 21:10

## 2019-05-22 RX ADMIN — HEPARIN SODIUM 5000 UNIT(S): 5000 INJECTION INTRAVENOUS; SUBCUTANEOUS at 16:00

## 2019-05-22 RX ADMIN — SPIRONOLACTONE 25 MILLIGRAM(S): 25 TABLET, FILM COATED ORAL at 05:57

## 2019-05-22 RX ADMIN — HEPARIN SODIUM 5000 UNIT(S): 5000 INJECTION INTRAVENOUS; SUBCUTANEOUS at 05:57

## 2019-05-22 RX ADMIN — Medication 3 MILLIGRAM(S): at 21:09

## 2019-05-22 NOTE — PROGRESS NOTE ADULT - ASSESSMENT
Mr. Skaggs is a 69 year old man with prior NHL (treated with R-CHOP 2004 and BR in 2010), chronic HFrEF (likely 2/2 Doxorubicin, EF:45%, decreased RVSF) who was sent to the ED on 5/8 with AMS and heart block - he progressed to complete heart block with encephalopathy requiring intubation and a leadless pacemaker.  His hospital course has included a KUSHAL revealing severe MR 2/2 a dilated MV annulus, severe pHTN (likely post-capillary 2/2 MR).  HF followed during the work up for a MitraClip which is not being done as an inpatient.  He has diuresed well this admission and is now euvolemic, tolerating neurohormonal therapy.

## 2019-05-22 NOTE — PROGRESS NOTE ADULT - SUBJECTIVE AND OBJECTIVE BOX
[facility]   [phone]  RENAL FOLLOW UP NOTE  --------------------------------------------------------------------------------  Chief Complaint:    24 hour events/subjective:    seen   denies any c/o     PAST HISTORY  --------------------------------------------------------------------------------  No significant changes to PMH, PSH, FHx, SHx, unless otherwise noted    ALLERGIES & MEDICATIONS  --------------------------------------------------------------------------------  Allergies    No Known Allergies    Intolerances      Standing Inpatient Medications  allopurinol 300 milliGRAM(s) Oral daily  aspirin  chewable 81 milliGRAM(s) Oral daily  carvedilol 12.5 milliGRAM(s) Oral every 12 hours  dextrose 5%. 1000 milliLiter(s) IV Continuous <Continuous>  dextrose 50% Injectable 12.5 Gram(s) IV Push once  diVALproex  milliGRAM(s) Oral two times a day  docusate sodium 100 milliGRAM(s) Oral three times a day  folic acid 1 milliGRAM(s) Oral daily  furosemide    Tablet 80 milliGRAM(s) Oral daily  heparin  Injectable 5000 Unit(s) SubCutaneous every 8 hours  hydrALAZINE 100 milliGRAM(s) Oral every 8 hours  insulin lispro (HumaLOG) corrective regimen sliding scale   SubCutaneous three times a day before meals  isosorbide   dinitrate Tablet (ISORDIL) 40 milliGRAM(s) Oral every 8 hours  melatonin 3 milliGRAM(s) Oral at bedtime  senna 2 Tablet(s) Oral at bedtime  spironolactone 25 milliGRAM(s) Oral daily    PRN Inpatient Medications  ALBUTerol/ipratropium for Nebulization 3 milliLiter(s) Nebulizer every 6 hours PRN  dextrose 40% Gel 15 Gram(s) Oral once PRN  glucagon  Injectable 1 milliGRAM(s) IntraMuscular once PRN  sodium chloride 0.9% lock flush 10 milliLiter(s) IV Push every 1 hour PRN  valproate sodium IVPB 125 milliGRAM(s) IV Intermittent every 8 hours PRN      REVIEW OF SYSTEMS  --------------------------------------------------------------------------------    Gen: denies fevers/chills,  CVS: denies chest pain/palpitations  Resp: denies SOB/Cough  GI: Denies N/V/Abd pain  : Denies dysuria/oliguria/hematuria    All other systems were reviewed and are negative, except as noted.    VITALS/PHYSICAL EXAM  --------------------------------------------------------------------------------  T(C): 36.5 (05-22-19 @ 04:46), Max: 36.9 (05-21-19 @ 21:24)  HR: 66 (05-22-19 @ 04:46) (59 - 66)  BP: 146/70 (05-22-19 @ 04:46) (126/60 - 146/70)  RR: 18 (05-22-19 @ 04:46) (18 - 18)  SpO2: 95% (05-22-19 @ 04:46) (94% - 97%)  Wt(kg): --        05-21-19 @ 07:01  -  05-22-19 @ 07:00  --------------------------------------------------------  IN: 360 mL / OUT: 0 mL / NET: 360 mL      Physical Exam:  	  	  Gen: Non toxic comfortable appearing forgetful    	no jvd   	Pulm: decrease bs  no rales or ronchi or wheezing  	CV: RRR, S1S2; no rub  	Abd: +BS, soft, nontender/nondistended  	: No suprapubic tenderness  	UE: Warm, no cyanosis  no clubbing,  no edema; no asterixis  	LE: Warm, no cyanosis  no clubbing, trace   edema      LABS/STUDIES  --------------------------------------------------------------------------------    137  |  91  |  52  ----------------------------<  93      [05-21-19 @ 07:05]  3.9   |  33  |  1.83        Ca     9.2     [05-21-19 @ 07:05]      Mg     2.4     [05-21-19 @ 07:05]            Creatinine Trend:  SCr 1.83 [05-21 @ 07:05]  SCr 1.83 [05-20 @ 06:52]  SCr 1.87 [05-19 @ 06:13]  SCr 1.75 [05-18 @ 07:11]  SCr 1.83 [05-17 @ 07:06]      	        Urinalysis - [05-10-19 @ 00:35]      Color Light Yellow / Appearance Clear / SG 1.012 / pH 6.0      Gluc Negative / Ketone Negative  / Bili Negative / Urobili Negative       Blood Moderate / Protein 30 mg/dL / Leuk Est Small / Nitrite Negative      RBC 1 / WBC 1 / Hyaline  / Gran  / Sq Epi  / Non Sq Epi 1 / Bacteria       Iron 27, TIBC 311, %sat 9      [05-08-19 @ 15:41]  HbA1c 6.0      [05-09-19 @ 10:52]  TSH 3.07      [05-17-19 @ 09:16]    Syphilis Screen (Treponema Pallidum Ab) Negative      [05-08-19 @ 15:39]

## 2019-05-22 NOTE — PROGRESS NOTE ADULT - SUBJECTIVE AND OBJECTIVE BOX
Patient is a 68y old  Male who presents with a chief complaint of confusion (08 May 2019 12:11)      HPI:  ** Patient poor historian **    Patient is a 68 year old Non-Hodgkin's Lymphoma tx with chemotherapy in , DM2 with CKD stage 3, HTN, CHF EF 35% on cardiac cath 2016, pleural effusion s/p left pleuracentesis in 10/2016, cardiomyopathy, gout and HLD presents to the ED sent in from cardiologists office because of EKG changes. Patient is not sure why he is here. He was not sure where he was, why he is here or what even the year is. He remarks that he retired a few months ago and he stopped taking his medications then because he "wasn't feeling good". He states he was wife his wife earlier but then later he states shes in [place]. Patient currently denies chest pain, shortness of breath, palpitations, syncope, fevers, chills, recent travel or sick contacts. No new meds however he has stopped taking most of his meds. He overall, feels fine and is not sure why he is in the hospital.     It's very difficult to get a clear history from patient. I have tried to reach his daughter but have not received a call back as of yet. I have called [facility] patients pharmacy and he states patient picked up furosemide and irbersartan in April but has not picked up any other meds in months. Mentation in  AAOX3.    In the ED, VSS. Labs at baseline, CT head with old stroke, Trops elevated and peaked at 50, now normal, EKG with TWI in lateral leads. (08 May 2019 10:33)    5 as the above record indicates, the pt is here and is a very poor historian. I was asked by Dr Hawkins from oncology and by the pt's internist to see the pt as he did have a past of lymphoma. The oncology office will have to look up records as his history is not readily available. When I came to see the pt he was not able to supply much info about his cancer other than he had lymphoma and did not know the type or who treated him. Dr Hawkins thinks he may have been treated by his previous associate, Dr Sarmiento.     At the time of my visit the pt was alert and oriented but was not able to give specific details about any of his medical issues. His chemistries appeared all unremarkable and he was not febrile and his ct of the head showed nothing of note.  I will see if there are any records on his previous onc history and see if there is any reason to think it may have contributed to his present admit here.  events of last day was noted and pt was intubated and had heart failure. The records were reviewed at [facility] and pt in  had a large cell lymphoma and was treated with R CHOP. In  he went to [facility] and was treated for a recurrence with BR and he has remained in remission. 5/10 still intubated and sedated and labs reviewed and thus far no direct evidence for lymphoma recurrence.  Pt still intubated and sedated. Anemia from icu anemia causes no evidence for recurrent lymphoma.  the pt was extubated on this date and hemoglobin was noted to be 11.0 will look when stable for any evidenced of recurrent lymphoma.  notes reviewed and pt still looked somewhat disoriented will check him for nodes in the am counts ok.  stable but confused ands seen by neuro who feels confusion is from the cardiac problems. Pt to be evaluated for MV repair. 5/15 very poor memory and cognitive decline in this pt as he could not remember 4 objects one minute later. He has good right sided mental abilities and good spacial ability. We do not know how long or fast this cognitive decline has been in play. Will have to try to reach out to family members, he cannot recall being treated for his lymphoma. Ct head without contrast. Renal function is poor, so that we are limited with ct of the brain and body without contrast. ? multiinfarct dementia neuro cognitive studies check B12 and tsh levels.  pt is stable and neuro evaluation proceeding with scans of the head.  pt had ct of the abdomen and pelvis and chest and the nodes in the chest appear inconsequential. The spleen is 15.9 cm minimally enlarged and liver is read and slightly enlarged with normal liver function studies. Considering the history I do not think these findings warrant a search for a lymphoma.  pt was stable and workup continues and decisions on heart pending.  stable and notes reviewed in detail labs noted and decisions on heart procedures are pending.  pt is stable and cardiac workup decisions to be finalized.  MEDICATIONS  (STANDING):  allopurinol 300 milliGRAM(s) Oral daily  aspirin  chewable 81 milliGRAM(s) Oral daily  carvedilol 12.5 milliGRAM(s) Oral every 12 hours  dextrose 5%. 1000 milliLiter(s) (50 mL/Hr) IV Continuous <Continuous>  dextrose 50% Injectable 12.5 Gram(s) IV Push once  diVALproex  milliGRAM(s) Oral two times a day  docusate sodium 100 milliGRAM(s) Oral three times a day  folic acid 1 milliGRAM(s) Oral daily  furosemide   Injectable 80 milliGRAM(s) IV Push daily  heparin  Injectable 5000 Unit(s) SubCutaneous every 8 hours  hydrALAZINE 100 milliGRAM(s) Oral every 8 hours  insulin lispro (HumaLOG) corrective regimen sliding scale   SubCutaneous three times a day before meals  isosorbide   dinitrate Tablet (ISORDIL) 40 milliGRAM(s) Oral every 8 hours  melatonin 3 milliGRAM(s) Oral at bedtime  senna 2 Tablet(s) Oral at bedtime  spironolactone 25 milliGRAM(s) Oral daily        142  |  99  |  28<H>  ----------------------------<  90  3.5   |  30  |  1.83<H>    Ca    9.3      17 May 2019 07:06      Urinalysis Basic - ( 08 May 2019 00:17 )    Color: Yellow / Appearance: Slightly Turbid / S.023 / pH: x  Gluc: x / Ketone: Negative  / Bili: Small / Urobili: 3 mg/dL   Blood: x / Protein: 300 mg/dL / Nitrite: Negative   Leuk Esterase: Negative / RBC: 2 /hpf / WBC 7 /HPF   Sq Epi: x / Non Sq Epi: 1 /hpf / Bacteria: Negative        ROS:  Negative except for:    PAST MEDICAL & SURGICAL HISTORY:  Pleural effusion  Moderate mitral regurgitation  Systolic heart failure  Rhinitis, allergic  Essential hypertension  DM type 2 (diabetes mellitus, type 2)  Non-Hodgkins Lymphoma  Non-Hodgkin lymphoma: h/o axillary dissection      SOCIAL HISTORY:    FAMILY HISTORY:  Family history of non-Hodgkin's lymphoma (Sibling)  Family history of CHF (congestive heart failure)      Allergies    No Known Allergies    Intolerances        PHYSICAL EXAM  General: lying in the bed and not in distress  HEENT: stable   Neck: supple  CV: rr   Lungs: decreased breath sounds at the bases  Abdomen: soft non-tender non-distended, no hepatosplenomegaly  Ext: no clubbing cyanosis or edema  Skin: no rashes and no petechiae  Neuro: alert and oriented X3 no focal deficits    BLOOD SMEAR INTERPRETATION:    RADIOLOGY :

## 2019-05-22 NOTE — PROGRESS NOTE ADULT - ASSESSMENT
69 year old man with prior NHL (treated with R-CHOP 2004 and BR in 2010), chronic HFrEF (likely 2/2 Doxorubicin, EF:45%, decreased RVSF) who was sent to the ED on 5/8 with AMS and heart block - he progressed to complete heart block with encephalopathy requiring intubation and a leadless pacemaker with severe MR and CHF with STEPHEN on ckd III     1- STEPHEN on ckd III  2- CHF  3- HTN  4- hx gout       creatinine repeat  hydralazine 100 mg tid   lasix to 80 mg po daily and aldactone 25mg qd  daily weights   allopurinol 300 mg daily

## 2019-05-22 NOTE — PROGRESS NOTE ADULT - PROBLEM SELECTOR PLAN 4
SCr at new baseline   Cont current management as above    HF will sign off, please call with questions    SUKHDEV Nascimento  Cardiology Fellow  [phone] SCr at new baseline   Cont current management as above    HF will sign off, please call with questions. The patient can follow up with Dr. Carrillo as an outpatient - please call [phone] when the patient is ready for discharge and we will schedule an appointment for him    SUKHDEV Nascimento  Cardiology Fellow  [phone]

## 2019-05-22 NOTE — PROGRESS NOTE ADULT - SUBJECTIVE AND OBJECTIVE BOX
Patient seen and examined at bedside on 3 DSU    Overnight Events: No events overnight. Lasix was changed from 80 IVP daily to 80 p.o. daily on 5/21    Review Of Systems: Unable to obtain as the patient is AOx1 (name)         Current Meds:  ALBUTerol/ipratropium for Nebulization 3 milliLiter(s) Nebulizer every 6 hours PRN  allopurinol 300 milliGRAM(s) Oral daily  aspirin  chewable 81 milliGRAM(s) Oral daily  carvedilol 12.5 milliGRAM(s) Oral every 12 hoursce  diVALproex  milliGRAM(s) Oral two times a day  docusate sodium 100 milliGRAM(s) Oral three times a day  folic acid 1 milliGRAM(s) Oral daily  furosemide    Tablet 80 milliGRAM(s) Oral daily  heparin  Injectable 5000 Unit(s) SubCutaneous every 8 hours  hydrALAZINE 100 milliGRAM(s) Oral every 8 hours  insulin lispro (HumaLOG) corrective regimen sliding scale   SubCutaneous three times a day before meals  isosorbide   dinitrate Tablet (ISORDIL) 40 milliGRAM(s) Oral every 8 hours  melatonin 3 milliGRAM(s) Oral at bedtime  senna 2 Tablet(s) Oral at bedtime  sodium chloride 0.9% lock flush 10 milliLiter(s) IV Push every 1 hour PRN  spironolactone 25 milliGRAM(s) Oral daily  valproate sodium IVPB 125 milliGRAM(s) IV Intermittent every 8 hours PRN    Vitals:  T(F): 97.7 (05-22), Max: 98.4 (05-21)  HR: 66 (05-22) (59 - 66)  BP: 146/70 (05-22) (126/60 - 146/70)  RR: 18 (05-22)  SpO2: 95% on RA    I&O's Summary    21 May 2019 07:01  -  22 May 2019 07:00  --------------------------------------------------------  IN: 360 mL / OUT: 0 mL / NET: 360 mL - likely not accurate    Weight: 79.9kg (bed) <-- 80.6kg (standing from 5/20)    Physical Exam:  Appearance: No acute distress; well appearing, breathing comfortably, sitting in chair at bedside  Eyes: PERRL, EOMI, pink conjunctiva  HEENT: Normal oral mucosa  Cardiovascular: RRR, S1, S2, 2/6 MR murmur, no rubs, or gallops; JVP not elevated (pt sitting upright)    Respiratory: Clear to auscultation bilaterally, no rales  Gastrointestinal: soft, non-tender, non-distended with normal bowel sounds  Musculoskeletal: No clubbing; no joint deformity; trace b/l ankle edema   Neurologic: Non-focal  Lymphatic: No lymphadenopathy  Psychiatry: AAOx1 (name), pleasant, not agitated  Skin: No rashes, ecchymoses, or cyanosis      05-21    137  |  91<L>  |  52<H>  ----------------------------<  93  3.9   |  33<H>  |  1.83<H> - stable Cr since 5/17    Ca    9.2      21 May 2019 07:05  Mg     2.4     05-21    Interpretation of Telemetry: Sinus w/ CHB and V pacing @ 60 - Tele was D/C'ed today

## 2019-05-22 NOTE — CHART NOTE - NSCHARTNOTEFT_GEN_A_CORE
Nutrition Follow Up Note  Patient seen for: follow up  · Reason for Admission	confusion  Mr. Skaggs is a 69 year old man with prior NHL (treated with R-CHOP  and BR in ), chronic HFrEF (likely 2/2 Doxorubicin, EF:45%, decreased RVSF) who was sent to the ED on  with AMS and heart block - he progressed to complete heart block with encephalopathy requiring intubation and a leadless pacemaker.  His hospital course has included a KUSHAL revealing severe MR 2/2 a dilated MV annulus, severe pHTN (likely post-capillary 2/2 MR).  HF followed during the work up for a MitraClip which is not being done as an inpatient.  He has diuresed well this admission and is now euvolemic, tolerating neurohormonal therapy.    pt remains acute, on iv lasix. Discharge plan unclear pending hospital course.      Source: pt, chart    Diet : consistent carbohydrate, dash, 60gram protein  Danactive x 2 daily    Patient reports: understands diet except for 60 gram protein. he  dislikes Danactive and he is not drinking it. Will discuss with provider and have it discontinued as well as alert nurse manager.      PO intake : >75% of meals     Source for PO intake: pt        Daily Weight in k.9 (05-22), Weight in k.6 (05-20), Weight in k.8 (05-19), Weight in k.9 (05-18), Weight in k.3 (05-17)  % Weight Change: 5% loss since previous assess on .    Pertinent Medications: MEDICATIONS  (STANDING):  allopurinol 300 milliGRAM(s) Oral daily  aspirin  chewable 81 milliGRAM(s) Oral daily  carvedilol 12.5 milliGRAM(s) Oral every 12 hours  dextrose 5%. 1000 milliLiter(s) (50 mL/Hr) IV Continuous <Continuous>  dextrose 50% Injectable 12.5 Gram(s) IV Push once  diVALproex  milliGRAM(s) Oral two times a day  docusate sodium 100 milliGRAM(s) Oral three times a day  folic acid 1 milliGRAM(s) Oral daily  furosemide    Tablet 80 milliGRAM(s) Oral daily  heparin  Injectable 5000 Unit(s) SubCutaneous every 8 hours  hydrALAZINE 100 milliGRAM(s) Oral every 8 hours  insulin lispro (HumaLOG) corrective regimen sliding scale   SubCutaneous three times a day before meals  isosorbide   dinitrate Tablet (ISORDIL) 40 milliGRAM(s) Oral every 8 hours  melatonin 3 milliGRAM(s) Oral at bedtime  senna 2 Tablet(s) Oral at bedtime  spironolactone 25 milliGRAM(s) Oral daily    MEDICATIONS  (PRN):  ALBUTerol/ipratropium for Nebulization 3 milliLiter(s) Nebulizer every 6 hours PRN Bronchospasm  dextrose 40% Gel 15 Gram(s) Oral once PRN Blood Glucose LESS THAN 70 milliGRAM(s)/deciliter  glucagon  Injectable 1 milliGRAM(s) IntraMuscular once PRN Glucose LESS THAN 70 milligrams/deciliter  sodium chloride 0.9% lock flush 10 milliLiter(s) IV Push every 1 hour PRN Pre/post blood products, medications, blood draw, and to maintain line patency  valproate sodium IVPB 125 milliGRAM(s) IV Intermittent every 8 hours PRN agitation    Pertinent Labs:   Finger Sticks:  POCT Blood Glucose.: 116 mg/dL ( @ 08:55)  POCT Blood Glucose.: 153 mg/dL ( @ 21:05)  POCT Blood Glucose.: 103 mg/dL ( @ 18:04)  POCT Blood Glucose.: 143 mg/dL ( @ 12:47)      Skin per nursing documentation: no pressure injuries  Edema: +1 right ankle    Estimated Needs:   [x ] no change since previous assessment  [ ] recalculated:     Previous Nutrition Diagnosis:  Inadequate protein-energy intake  Nutrition Diagnosis is: improving since pt intake is >75% of meals         Interventions: reviewed and reinforced current diet orders and reasons for pt being on it  Provided diet ed on Diabetes and Chronic Kidney Disease     Recommend  1)continue consistent carbohydrate, Soft, 60gram protein, dash diet  2)discontinue Danactive x 2 daily  3)monitor need for additional supplement  4)Nephro-Velma daily      Monitoring and Evaluation:     Continue to monitor Nutritional intake, Tolerance to diet prescription, weights, labs, skin integrity    RD remains available upon request and will follow up per protocol  Mary Seaman MA, RD, CDN #[phone] Nutrition Follow Up Note  Patient seen for: follow up  · Reason for Admission	confusion  Mr. Skaggs is a 69 year old man with prior NHL (treated with R-CHOP  and BR in ), chronic HFrEF (likely 2/2 Doxorubicin, EF:45%, decreased RVSF) who was sent to the ED on  with AMS and heart block - he progressed to complete heart block with encephalopathy requiring intubation and a leadless pacemaker.  His hospital course has included a KUSHAL revealing severe MR 2/2 a dilated MV annulus, severe pHTN (likely post-capillary 2/2 MR).  HF followed during the work up for a MitraClip which is not being done as an inpatient.  He has diuresed well this admission and is now euvolemic, tolerating neurohormonal therapy.    pt remains acute, on iv lasix. Discharge plan unclear pending hospital course.      Source: pt, chart    Diet : consistent carbohydrate, dash, 60gram protein  Danactive x 2 daily    Patient reports: understands diet except for 60 gram protein. he  dislikes Danactive and he is not drinking it. Will discuss with provider and have it discontinued as well as alert nurse manager.      PO intake : >75% of meals     Source for PO intake: pt        Daily Weight in k.9 (05-22), Weight in k.6 (05-20), Weight in k.8 (05-19), Weight in k.9 (05-18), Weight in k.3 (05-17)  % Weight Change: 5% loss since previous assess on .    Pertinent Medications: MEDICATIONS  (STANDING):  allopurinol 300 milliGRAM(s) Oral daily  aspirin  chewable 81 milliGRAM(s) Oral daily  carvedilol 12.5 milliGRAM(s) Oral every 12 hours  dextrose 5%. 1000 milliLiter(s) (50 mL/Hr) IV Continuous <Continuous>  dextrose 50% Injectable 12.5 Gram(s) IV Push once  diVALproex  milliGRAM(s) Oral two times a day  docusate sodium 100 milliGRAM(s) Oral three times a day  folic acid 1 milliGRAM(s) Oral daily  furosemide    Tablet 80 milliGRAM(s) Oral daily  heparin  Injectable 5000 Unit(s) SubCutaneous every 8 hours  hydrALAZINE 100 milliGRAM(s) Oral every 8 hours  insulin lispro (HumaLOG) corrective regimen sliding scale   SubCutaneous three times a day before meals  isosorbide   dinitrate Tablet (ISORDIL) 40 milliGRAM(s) Oral every 8 hours  melatonin 3 milliGRAM(s) Oral at bedtime  senna 2 Tablet(s) Oral at bedtime  spironolactone 25 milliGRAM(s) Oral daily    MEDICATIONS  (PRN):  ALBUTerol/ipratropium for Nebulization 3 milliLiter(s) Nebulizer every 6 hours PRN Bronchospasm  dextrose 40% Gel 15 Gram(s) Oral once PRN Blood Glucose LESS THAN 70 milliGRAM(s)/deciliter  glucagon  Injectable 1 milliGRAM(s) IntraMuscular once PRN Glucose LESS THAN 70 milligrams/deciliter  sodium chloride 0.9% lock flush 10 milliLiter(s) IV Push every 1 hour PRN Pre/post blood products, medications, blood draw, and to maintain line patency  valproate sodium IVPB 125 milliGRAM(s) IV Intermittent every 8 hours PRN agitation    Pertinent Labs:   Finger Sticks:  POCT Blood Glucose.: 116 mg/dL ( @ 08:55)  POCT Blood Glucose.: 153 mg/dL ( @ 21:05)  POCT Blood Glucose.: 103 mg/dL ( @ 18:04)  POCT Blood Glucose.: 143 mg/dL ( @ 12:47)      Skin per nursing documentation: no pressure injuries  Edema: +1 right ankle    Estimated Needs:   [x ] no change since previous assessment  [ ] recalculated:     Previous Nutrition Diagnosis:  Inadequate protein-energy intake  Nutrition Diagnosis is: improving since pt intake is >75% of meals         Interventions: reviewed and reinforced current diet orders and reasons for pt being on it  Provided diet ed on Diabetes and Chronic Kidney Disease including protein modification     Recommend  1)continue consistent carbohydrate, Soft, 60gram protein, dash diet  2)discontinue Danactive x 2 daily  3)monitor need for additional supplement  4)Nephro-Velma daily      Monitoring and Evaluation:     Continue to monitor Nutritional intake, Tolerance to diet prescription, weights, labs, skin integrity    RD remains available upon request and will follow up per protocol  Mary Seaman MA, RD, CDN #[phone]

## 2019-05-22 NOTE — PROGRESS NOTE ADULT - PROBLEM SELECTOR PLAN 1
-c/w current doses Isordil and Hydralazine  -c/w Coreg 12.5 bid  -c/w Aldactone 25 mg  -c/w Lasix 80 p.o. daily  -Strict I/Os, daily standing weights

## 2019-05-22 NOTE — PROGRESS NOTE ADULT - SUBJECTIVE AND OBJECTIVE BOX
Patient is a 69y old  Male who presents with a chief complaint of confusion (22 May 2019 13:03)      INTERVAL HPI/OVERNIGHT EVENTS: noted, feels well, improved sob and le edema      Vital Signs Last 24 Hrs  T(C): 36.5 (22 May 2019 12:25), Max: 36.9 (21 May 2019 21:24)  T(F): 97.7 (22 May 2019 12:25), Max: 98.4 (21 May 2019 21:24)  HR: 60 (22 May 2019 12:25) (60 - 66)  BP: 126/64 (22 May 2019 12:25) (126/64 - 146/70)  BP(mean): --  RR: 18 (22 May 2019 12:25) (18 - 18)  SpO2: 96% (22 May 2019 12:25) (95% - 97%)    ALBUTerol/ipratropium for Nebulization 3 milliLiter(s) Nebulizer every 6 hours PRN  allopurinol 300 milliGRAM(s) Oral daily  aspirin  chewable 81 milliGRAM(s) Oral daily  carvedilol 12.5 milliGRAM(s) Oral every 12 hours  dextrose 40% Gel 15 Gram(s) Oral once PRN  dextrose 5%. 1000 milliLiter(s) IV Continuous <Continuous>  dextrose 50% Injectable 12.5 Gram(s) IV Push once  diVALproex  milliGRAM(s) Oral two times a day  docusate sodium 100 milliGRAM(s) Oral three times a day  folic acid 1 milliGRAM(s) Oral daily  furosemide    Tablet 80 milliGRAM(s) Oral daily  glucagon  Injectable 1 milliGRAM(s) IntraMuscular once PRN  heparin  Injectable 5000 Unit(s) SubCutaneous every 8 hours  hydrALAZINE 100 milliGRAM(s) Oral every 8 hours  insulin lispro (HumaLOG) corrective regimen sliding scale   SubCutaneous three times a day before meals  isosorbide   dinitrate Tablet (ISORDIL) 40 milliGRAM(s) Oral every 8 hours  melatonin 3 milliGRAM(s) Oral at bedtime  Nephro-kwaku 1 Tablet(s) Oral daily  senna 2 Tablet(s) Oral at bedtime  sodium chloride 0.9% lock flush 10 milliLiter(s) IV Push every 1 hour PRN  spironolactone 25 milliGRAM(s) Oral daily  valproate sodium IVPB 125 milliGRAM(s) IV Intermittent every 8 hours PRN      PHYSICAL EXAM:  GENERAL: NAD,   EYES: conjunctiva and sclera clear  ENMT: Moist mucous membranes  NECK: Supple, No JVD, Normal thyroid  NERVOUS SYSTEM:  Alert & Oriented X1-2,   CHEST/LUNG: Clear to auscultation bilaterally; No rales, rhonchi, wheezing, or rubs  HEART: Regular rate and rhythm; No murmurs, rubs, or gallops  ABDOMEN: Soft, Nontender, Nondistended; Bowel sounds present  EXTREMITIES:  2+ Peripheral Pulses, No clubbing, cyanosis, or edema  LYMPH: No lymphadenopathy noted  SKIN: No rashes or lesions    Consultant(s) Notes Reviewed:  [x ] YES  [ ] NO  Care Discussed with Consultants/Other Providers [ x] YES  [ ] NO    LABS:    05-21    137  |  91<L>  |  52<H>  ----------------------------<  93  3.9   |  33<H>  |  1.83<H>    Ca    9.2      21 May 2019 07:05  Mg     2.4     05-21          CAPILLARY BLOOD GLUCOSE      POCT Blood Glucose.: 111 mg/dL (22 May 2019 13:17)  POCT Blood Glucose.: 116 mg/dL (22 May 2019 08:55)  POCT Blood Glucose.: 153 mg/dL (21 May 2019 21:05)  POCT Blood Glucose.: 103 mg/dL (21 May 2019 18:04)            RADIOLOGY & ADDITIONAL TESTS:    Imaging Personally Reviewed:  [ x] YES  [ ] NO

## 2019-05-22 NOTE — PROGRESS NOTE ADULT - ASSESSMENT
68 year old Non-Hodgkin's Lymphoma tx with chemotherapy in 2004, DM2 with CKD stage 3, HTN, CHF EF 35% on cardiac cath 12/ 2016, pleural effusion s/p left pleuracentesis in 10/2016, cardiomyopathy, gout and HLD presents to the ED sent in from cardiologists office because of AMS    # Respi failure/apnea/hypoxia-  sp Extubated  CxR-unchanged rt loculated small effusion, mild interstitial edema    # CHB sp micra ppm implant 5/10    #Acute encephalopathy. : resolving, likely back to baseline  possibly dt underlying dementia r/o vascular dementia vs hypoxic brain injury d/t  prolonged periods of CHB, ruled out infectious etiology  chronic parietal infarct in CT head   MR brain reviewed  , neuro cs fu  vitb12, folate, iron panel, rpr and tsh levels wnl  unlikley leptomeningeal involvement  given no evidence of lymphoma ..   appreciate onco input :::  pt had ct of the abdomen and pelvis and chest and the nodes in the chest appear inconsequential. The spleen is 15.9 cm minimally enlarged and liver is read and slightly enlarged with normal liver function studies. Considering the history I do not think these findings warrant a search for a lymphoma.  neuro reconsult- assist w dc plan  Psych reconsult: no psych contraindications for dc    # Fevers- afebrile  UA neg, CXR neg, bld cx ngtd  observe off abx       # acute on chronic  systolic heart failure.    - TTE EF 45%, likely severe MR, moderately enlarged RV , severe pulmonary hypertension. Estimated PASP = 75 mmHg.  - diuresis per HF team ..monitor Cr , HF team called  - f/u with surgery for severe MR , cont lasix 80po qd        # Non-Hodgkin lymphoma of lymph nodes of neck, unspecified non-Hodgkin lymphoma type.  Plan: - treated with chemotherapy in 2014.   Anemia-monitor h/h  as above     # Essential hypertension.  Plan: - bp stable  - off  arb and beta blocker.     # Type 2 diabetes mellitus with chronic kidney disease, without long-term current use of insulin, unspecified CKD stage. Plan: - fs achs  - fs controlled  - start sliding scale insulin      # Chronic gout without tophus, unspecified cause, unspecified site.   - c/w allopurinol 300 mg daily.   \    STEPHEN :  f/.u with renal ... off ARB

## 2019-05-22 NOTE — PROGRESS NOTE ADULT - ATTENDING COMMENTS
69yrs, Non hodkins treated 2004, Second round chemo 2010 in remission.   HFrEF for some years. No recent admisisons for HF.   home meds; asa, erbasartan 150, lasix 20 bid, toprol 25, ald 25.  TTE 5.8: LVEF 45, LVEDD 5.5, severe MR. RV enalrgement and decreased RV funciton with RVSP 72. Mod TR.   Admitted 5.8 with altered MS with 2:1 to CHB. intubated, TVP.   5.10 RHC: RA 17. RV 92/18, PA 75/36 48, PCWP 27, LVEDp 25, PA 60, Aquiles 4.9/2.2  No sig CAD.  MICRA implanted. Currently SR with CHB and V paced.   IV diuresis. aprox 30lb weight loss.   admission Cr 1.5, peak 1.8, yesterday 1.8  Evaluated for mitraclip but deferred.   Current meds: asa, lasix 80, HDZN 100 tid, Ald 25, ISDN 40 tid, Coreg 12.5 bid.   Patient lives alone. Ao X1 but unclear is a change from baseline. Brain scanning shows old infarct.   120/-140/ , 60,   I/O not recorded.  05-21  137  |  91<L>  |  52<H>  ----------------------------<  93  3.9   |  33<H>  |  1.83<H>  Ca    9.2      21 May 2019 07:05  Mg     2.4     05-21  5.17; WBC 8.3, Hb 10.4 Plt 225 69yrs, Non hodkins treated 2004, Second round chemo 2010 in remission.   HFrEF for some years. No recent admisisons for HF.   home meds; asa, erbasartan 150, lasix 20 bid, toprol 25, ald 25.  TTE 5.8: LVEF 45, LVEDD 5.5, severe MR. RV enalrgement and decreased RV funciton with RVSP 72. Mod TR.   Admitted 5.8 with altered MS with 2:1 to CHB. intubated, TVP.   5.10 RHC: RA 17. RV 92/18, PA 75/36 48, PCWP 27, LVEDp 25, PA 60, Aquiles 4.9/2.2  No sig CAD.  MICRA implanted. Currently SR with CHB and V paced.   IV diuresis. aprox 30lb weight loss.   admission Cr 1.5, peak 1.8, yesterday 1.8  Evaluated for mitraclip but deferred.   Current meds: asa, lasix 80, HDZN 100 tid, Ald 25, ISDN 40 tid, Coreg 12.5 bid.   Patient lives alone. Ao X1 but unclear is a change from baseline. Brain scanning shows old infarct.   120/-140/ , 60,   No JVP, lungs clear, abdo soft, trace right LE edema  I/O not recorded.  05-21  137  |  91<L>  |  52<H>  ----------------------------<  93  3.9   |  33<H>  |  1.83<H>  Ca    9.2      21 May 2019 07:05  Mg     2.4     05-21  5.17; WBC 8.3, Hb 10.4 Plt 225  Patient appears euvolemic and assymptomatic on reasnoble medical therapy.  I am  happy to follow patient as an outpatient.   At that time we should consider BIV upgrade and possible mitrclip.  Korey Carrillo

## 2019-05-22 NOTE — CHART NOTE - NSCHARTNOTEFT_GEN_A_CORE
Patient can follow up with long term neurocognitive testing as outpatient for areas of fine deficit.     Physicians include:  Dr Alida Yap  PG2 Neurology resident

## 2019-05-22 NOTE — PROGRESS NOTE ADULT - ASSESSMENT
5/8 as the above record indicates, the pt is here and is a very poor historian. I was asked by Dr Hawkins from oncology and by the pt's internist to see the pt as he did have a past of lymphoma. The oncology office will have to look up records as his history is not readily available. When I came to see the pt he was not able to supply much info about his cancer other than he had lymphoma and did not know the type or who treated him. Dr Hawkins thinks he may have been treated by his previous associate, Dr Sarmiento.     At the time of my visit the pt was alert and oriented but was not able to give specific details about any of his medical issues. His chemistries appeared all unremarkable and he was not febrile and his ct of the head showed nothing of note.  I will see if there are any records on his previous onc history and see if there is any reason to think it may have contributed to his present admit here.     5/9 events of last day was noted and pt was intubated and had heart failure. The records were reviewed at 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 In 2010 he went to [facility] and was treated for a recurrence with BR and he has remained in remission.   5/10 still intubated and sedated and labs reviewed and thus far no direct evidence for lymphoma recurrence.  . 5/11 Pt still intubated and sedated. Anemia from icu anemia causes no evidence for recurrent lymphoma.   5/12 the pt was extubated on this date and hemoglobin was noted to be 11.0 will look when stable for any evidenced of recurrent lymphoma.   5/13 notes reviewed and pt still looked somewhat disoriented will check him for nodes in the am counts ok.  . 5/14 stable but confused ands seen by neuro who feels confusion is from the cardiac problems. Pt to be evaluated for MV repair.   5/15 very poor memory and cognitive decline in this pt as he could not remember 4 objects one minute later. He has good right sided mental abilities and good spacial ability. We do not know how long or fast this cognitive decline has been in play. Will have to try to reach out to family members, he cannot recall being treated for his lymphoma. Ct head without contrast. Renal function is poor, so that we are limited with ct of the brain and body without contrast. ? multiinfarct dementia neuro cognitive studies check B12 and tsh levels. . 5/16 pt is stable and neuro evaluation proceeding with scans of the head.  5/17 pt had ct of the abdomen and pelvis and chest and the nodes in the chest appear inconsequential. The spleen is 15.9 cm minimally enlarged and liver is read and slightly enlarged with normal liver function studies. Considering the history I do not think these findings warrant a search for a lymphoma.  5/20 pt was stable and workup continues and decisions on heart pending.  5/21 stable and notes reviewed in detail labs noted and decisions on heart procedures are pending.  5/22 pt is stable and cardiac workup decisions to be finalized.

## 2019-05-23 ENCOUNTER — TRANSCRIPTION ENCOUNTER (OUTPATIENT)
Age: 69
End: 2019-05-23

## 2019-05-23 LAB
ANION GAP SERPL CALC-SCNC: 15 MMOL/L — SIGNIFICANT CHANGE UP (ref 5–17)
BUN SERPL-MCNC: 46 MG/DL — HIGH (ref 7–23)
CALCIUM SERPL-MCNC: 9.3 MG/DL — SIGNIFICANT CHANGE UP (ref 8.4–10.5)
CHLORIDE SERPL-SCNC: 94 MMOL/L — LOW (ref 96–108)
CO2 SERPL-SCNC: 29 MMOL/L — SIGNIFICANT CHANGE UP (ref 22–31)
CREAT SERPL-MCNC: 1.59 MG/DL — HIGH (ref 0.5–1.3)
GLUCOSE BLDC GLUCOMTR-MCNC: 128 MG/DL — HIGH (ref 70–99)
GLUCOSE BLDC GLUCOMTR-MCNC: 130 MG/DL — HIGH (ref 70–99)
GLUCOSE BLDC GLUCOMTR-MCNC: 136 MG/DL — HIGH (ref 70–99)
GLUCOSE SERPL-MCNC: 100 MG/DL — HIGH (ref 70–99)
POTASSIUM SERPL-MCNC: 4.1 MMOL/L — SIGNIFICANT CHANGE UP (ref 3.5–5.3)
POTASSIUM SERPL-SCNC: 4.1 MMOL/L — SIGNIFICANT CHANGE UP (ref 3.5–5.3)
SODIUM SERPL-SCNC: 138 MMOL/L — SIGNIFICANT CHANGE UP (ref 135–145)

## 2019-05-23 RX ADMIN — Medication 81 MILLIGRAM(S): at 12:15

## 2019-05-23 RX ADMIN — HEPARIN SODIUM 5000 UNIT(S): 5000 INJECTION INTRAVENOUS; SUBCUTANEOUS at 13:45

## 2019-05-23 RX ADMIN — DIVALPROEX SODIUM 250 MILLIGRAM(S): 500 TABLET, DELAYED RELEASE ORAL at 17:22

## 2019-05-23 RX ADMIN — ISOSORBIDE DINITRATE 40 MILLIGRAM(S): 5 TABLET ORAL at 05:32

## 2019-05-23 RX ADMIN — Medication 100 MILLIGRAM(S): at 05:33

## 2019-05-23 RX ADMIN — CARVEDILOL PHOSPHATE 12.5 MILLIGRAM(S): 80 CAPSULE, EXTENDED RELEASE ORAL at 21:34

## 2019-05-23 RX ADMIN — Medication 100 MILLIGRAM(S): at 21:34

## 2019-05-23 RX ADMIN — Medication 1 MILLIGRAM(S): at 13:46

## 2019-05-23 RX ADMIN — Medication 100 MILLIGRAM(S): at 05:32

## 2019-05-23 RX ADMIN — SPIRONOLACTONE 25 MILLIGRAM(S): 25 TABLET, FILM COATED ORAL at 05:32

## 2019-05-23 RX ADMIN — SENNA PLUS 2 TABLET(S): 8.6 TABLET ORAL at 21:34

## 2019-05-23 RX ADMIN — HEPARIN SODIUM 5000 UNIT(S): 5000 INJECTION INTRAVENOUS; SUBCUTANEOUS at 05:33

## 2019-05-23 RX ADMIN — CARVEDILOL PHOSPHATE 12.5 MILLIGRAM(S): 80 CAPSULE, EXTENDED RELEASE ORAL at 09:22

## 2019-05-23 RX ADMIN — Medication 300 MILLIGRAM(S): at 13:46

## 2019-05-23 RX ADMIN — Medication 80 MILLIGRAM(S): at 05:33

## 2019-05-23 RX ADMIN — Medication 3 MILLIGRAM(S): at 21:34

## 2019-05-23 RX ADMIN — ISOSORBIDE DINITRATE 40 MILLIGRAM(S): 5 TABLET ORAL at 21:34

## 2019-05-23 RX ADMIN — Medication 1 TABLET(S): at 12:14

## 2019-05-23 RX ADMIN — ISOSORBIDE DINITRATE 40 MILLIGRAM(S): 5 TABLET ORAL at 13:46

## 2019-05-23 RX ADMIN — Medication 100 MILLIGRAM(S): at 13:46

## 2019-05-23 RX ADMIN — HEPARIN SODIUM 5000 UNIT(S): 5000 INJECTION INTRAVENOUS; SUBCUTANEOUS at 21:34

## 2019-05-23 RX ADMIN — DIVALPROEX SODIUM 250 MILLIGRAM(S): 500 TABLET, DELAYED RELEASE ORAL at 05:33

## 2019-05-23 NOTE — DISCHARGE NOTE PROVIDER - NSDCFUADDAPPT_GEN_ALL_CORE_FT
- Once discharged, pt to followup in EP clinic in 14 days for device check. - Once discharged, pt to followup in EP clinic in 14 days for device check.  Please follow up with PMD outpatient in 1 week of discharge   Please follow up with Cardiology after discharge for Mitral Valve   Please Follow up - Once discharged, pt to followup in EP clinic in 14 days for device check.  Please follow up with PMD outpatient in 1 week of discharge   Please follow up with Cardiology after discharge for Mitral Valve   Please Follow up with Oncology / Hematology outpatient - Once discharged, pt to followup in EP clinic in 14 days for device check.  Please follow up with PMD outpatient in 1 week of discharge   Please follow up with Cardiology after discharge for Mitral Valve   Please Follow up with Oncology / Hematology outpatient  Please Follow up with CT Surgery outpatient Dr Teresa for Mitral valve replacement - Once discharged, pt to followup in EP clinic in 14 days for device check.  Please follow up with PMD outpatient in 1 week of discharge   Please follow up with Cardiology after discharge for Mitral Valve   Please Follow up with Oncology / Hematology outpatient  Please Follow up with CT Surgery outpatient Dr Teresa for Mitral valve replacement  Please Follow up with Nephrology outpatient

## 2019-05-23 NOTE — DISCHARGE NOTE NURSING/CASE MANAGEMENT/SOCIAL WORK - NSDCDPATPORTLINK_GEN_ALL_CORE
You can access the [facility] Portal, offered by [facility], by registering with the following website: [email or web address]

## 2019-05-23 NOTE — DISCHARGE NOTE NURSING/CASE MANAGEMENT/SOCIAL WORK - NSDCPNINST_GEN_ALL_CORE
any chest pain shortness of breath palpations go emergency room . right groin any bleed hardness go emergency room

## 2019-05-23 NOTE — DISCHARGE NOTE PROVIDER - CARE PROVIDER_API CALL
Karen Nelson (MD)  Cardiac Electrophysiology; Cardiovascular Disease; Internal Medicine  [address] [place] [address]  Phone: [phone]  Fax: [phone]  Follow Up Time: 2 weeks Karen Nelson)  Cardiac Electrophysiology; Cardiovascular Disease; Internal Medicine  [address] [place] [address]  Phone: [phone]  Fax: [phone]  Follow Up Time: 2 weeks    Michael Giordano)  Internal Medicine; Medical Oncology  [address] [place] [address]  Phone: [phone]  Fax: [phone]  Follow Up Time: 2 weeks Karen Nelson)  Cardiac Electrophysiology; Cardiovascular Disease; Internal Medicine  [address] [place] [address]  Phone: [phone]  Fax: [phone]  Follow Up Time: 2 weeks    Michael Giordano)  Internal Medicine; Medical Oncology  [address] [place] [address]  Phone: [phone]  Fax: [phone]  Follow Up Time: 2 weeks    Macho Teresa)  Thoracic and Cardiac Surgery  [address] [place] [address]  Phone: [phone]  Fax: [phone]  Follow Up Time: 2 weeks Karen Nelson)  Cardiac Electrophysiology; Cardiovascular Disease; Internal Medicine  [address] [place] [address]  Phone: [phone]  Fax: [phone]  Follow Up Time: 2 weeks    Michael Giordano)  Internal Medicine; Medical Oncology  [address] [place] [address]  Phone: [phone]  Fax: [phone]  Follow Up Time: 2 weeks    Macho Teresa)  Thoracic and Cardiac Surgery  [address] [place] [address]  Phone: [phone]  Fax: [phone]  Follow Up Time: 2 weeks    Nehemias Woods (DO)  Nephrology  [address] [place] [address]  Phone: [phone]  Fax: [phone]  Follow Up Time: 2 weeks

## 2019-05-23 NOTE — PROGRESS NOTE ADULT - ATTENDING COMMENTS
dc planning  left msg to dgtr Vonnie  spoke to wife Edwina on phone and updated status- requested pt be dced tomorrow    Sandra Buckley MD   [facility]

## 2019-05-23 NOTE — DISCHARGE NOTE PROVIDER - NSDCCPCAREPLAN_GEN_ALL_CORE_FT
PRINCIPAL DISCHARGE DIAGNOSIS  Diagnosis: Altered mental status  Assessment and Plan of Treatment: Fall precatiuon      SECONDARY DISCHARGE DIAGNOSES  Diagnosis: Stage 3 chronic kidney disease  Assessment and Plan of Treatment: Avoid taking (NSAIDs) - (ex: Ibuprofen, Advil, Celebrex, Naprosyn)  Avoid taking any nephrotoxic agents (can harm kidneys) - Intravenous contrast for diagnostic testing, combination cold medications.  Have all medications adjusted for your renal function by your Health Care Provider.  Blood pressure control is important.  Take all medication as prescribed.      Diagnosis: Acute on chronic systolic CHF (congestive heart failure), NYHA class 3  Assessment and Plan of Treatment: Weigh yourself daily.  If you gain 3lbs in 3 days, or 5lbs in a week call your Health Care Provider.  Do not eat or drink foods containing more than 2000mg of salt (sodium) in your diet every day.  Call your Health Care Provider if you have any swelling or increased swelling in your feet, ankles, and/or stomach.  Take all of your medication as directed.  If you become dizzy call your Health Care Provider.      Diagnosis: Dementia without behavioral disturbance, unspecified dementia type  Assessment and Plan of Treatment: Dementia without behavioral disturbance, unspecified dementia type    Diagnosis: Type 2 diabetes mellitus with chronic kidney disease, without long-term current use of insulin, unspecified CKD stage  Assessment and Plan of Treatment: HgA1C this admission.  Make sure you get your HgA1c checked every three months.  If you take oral diabetes medications, check your blood glucose two times a day.  If you take insulin, check your blood glucose before meals and at bedtime.  It's important not to skip any meals.  Keep a log of your blood glucose results and always take it with you to your doctor appointments.  Keep a list of your current medications including injectables and over the counter medications and bring this medication list with you to all your doctor appointments.  If you have not seen your opthalmologist this year call for appointment.  Check your feet daily for redness, sores, or openings. Do not self treat. If no improvement in two days call your primary care physician for an appointment.  Low blood sugar (hypoglycemia) is a blood sugar below 70mg/dl. Check your blood sugar if you feel signs/symptoms of hypoglycemia. If your blood sugar is below 70 take 15 grams of carbohydrates (ex 4 oz of apple juice, 3-4 glucosr tablets, or 4-6 oz of regular soda) wait 15 minutes and repeat blood sugar to make sure it comes up above 70.  If your blood sugar is above 70 and you are due for a meal, have a meal.  If you are not due for a meal have a snack.  This snack helps keeps your blood sugar at a safe range. PRINCIPAL DISCHARGE DIAGNOSIS  Diagnosis: Complete heart block  Assessment and Plan of Treatment: s/p micro pacemaker 5/10/2019   Monitor for signs of bleeding   Monitor incision site   Please follow up with EP         SECONDARY DISCHARGE DIAGNOSES  Diagnosis: Stage 3 chronic kidney disease  Assessment and Plan of Treatment: Avoid taking (NSAIDs) - (ex: Ibuprofen, Advil, Celebrex, Naprosyn)  Avoid taking any nephrotoxic agents (can harm kidneys) - Intravenous contrast for diagnostic testing, combination cold medications.  Have all medications adjusted for your renal function by your Health Care Provider.  Blood pressure control is important.  Take all medication as prescribed.      Diagnosis: Acute on chronic systolic CHF (congestive heart failure), NYHA class 3  Assessment and Plan of Treatment: Weigh yourself daily.  If you gain 3lbs in 3 days, or 5lbs in a week call your Health Care Provider.  Do not eat or drink foods containing more than 2000mg of salt (sodium) in your diet every day.  Call your Health Care Provider if you have any swelling or increased swelling in your feet, ankles, and/or stomach.  Take all of your medication as directed.  If you become dizzy call your Health Care Provider.      Diagnosis: Dementia without behavioral disturbance, unspecified dementia type  Assessment and Plan of Treatment: fall risk    Diagnosis: Severe mitral regurgitation  Assessment and Plan of Treatment: continue with current medications    Diagnosis: Type 2 diabetes mellitus with chronic kidney disease, without long-term current use of insulin, unspecified CKD stage  Assessment and Plan of Treatment: HgA1C this admission.  Make sure you get your HgA1c checked every three months.  If you take oral diabetes medications, check your blood glucose two times a day.  If you take insulin, check your blood glucose before meals and at bedtime.  It's important not to skip any meals.  Keep a log of your blood glucose results and always take it with you to your doctor appointments.  Keep a list of your current medications including injectables and over the counter medications and bring this medication list with you to all your doctor appointments.  If you have not seen your opthalmologist this year call for appointment.  Check your feet daily for redness, sores, or openings. Do not self treat. If no improvement in two days call your primary care physician for an appointment.  Low blood sugar (hypoglycemia) is a blood sugar below 70mg/dl. Check your blood sugar if you feel signs/symptoms of hypoglycemia. If your blood sugar is below 70 take 15 grams of carbohydrates (ex 4 oz of apple juice, 3-4 glucosr tablets, or 4-6 oz of regular soda) wait 15 minutes and repeat blood sugar to make sure it comes up above 70.  If your blood sugar is above 70 and you are due for a meal, have a meal.  If you are not due for a meal have a snack.  This snack helps keeps your blood sugar at a safe range.      Diagnosis: Mitral valve regurgitation  Assessment and Plan of Treatment: please follow up with [facility] outpatient   please continue with current medications

## 2019-05-23 NOTE — PROGRESS NOTE ADULT - SUBJECTIVE AND OBJECTIVE BOX
Patient is a 68y old  Male who presents with a chief complaint of confusion (08 May 2019 12:11)      HPI:  ** Patient poor historian **    Patient is a 68 year old Non-Hodgkin's Lymphoma tx with chemotherapy in , DM2 with CKD stage 3, HTN, CHF EF 35% on cardiac cath 2016, pleural effusion s/p left pleuracentesis in 10/2016, cardiomyopathy, gout and HLD presents to the ED sent in from cardiologists office because of EKG changes. Patient is not sure why he is here. He was not sure where he was, why he is here or what even the year is. He remarks that he retired a few months ago and he stopped taking his medications then because he "wasn't feeling good". He states he was wife his wife earlier but then later he states shes in [place]. Patient currently denies chest pain, shortness of breath, palpitations, syncope, fevers, chills, recent travel or sick contacts. No new meds however he has stopped taking most of his meds. He overall, feels fine and is not sure why he is in the hospital.     It's very difficult to get a clear history from patient. I have tried to reach his daughter but have not received a call back as of yet. I have called [facility] patients pharmacy and he states patient picked up furosemide and irbersartan in April but has not picked up any other meds in months. Mentation in  AAOX3.    In the ED, VSS. Labs at baseline, CT head with old stroke, Trops elevated and peaked at 50, now normal, EKG with TWI in lateral leads. (08 May 2019 10:33)    5 as the above record indicates, the pt is here and is a very poor historian. I was asked by Dr Hawkins from oncology and by the pt's internist to see the pt as he did have a past of lymphoma. The oncology office will have to look up records as his history is not readily available. When I came to see the pt he was not able to supply much info about his cancer other than he had lymphoma and did not know the type or who treated him. Dr Hawkins thinks he may have been treated by his previous associate, Dr Sarmiento.     At the time of my visit the pt was alert and oriented but was not able to give specific details about any of his medical issues. His chemistries appeared all unremarkable and he was not febrile and his ct of the head showed nothing of note.  I will see if there are any records on his previous onc history and see if there is any reason to think it may have contributed to his present admit here.  events of last day was noted and pt was intubated and had heart failure. The records were reviewed at [facility] and pt in  had a large cell lymphoma and was treated with R CHOP. In  he went [place] [facility] [place] and was treated for a recurrence with BR and he has remained in remission. 5/10 still intubated and sedated and labs reviewed and thus far no direct evidence for lymphoma recurrence.  Pt still intubated and sedated. Anemia from icu anemia causes no evidence for recurrent lymphoma.  the pt was extubated on this date and hemoglobin was noted to be 11.0 will look when stable for any evidenced of recurrent lymphoma.  notes reviewed and pt still looked somewhat disoriented will check him for nodes in the am counts ok.  stable but confused ands seen by neuro who feels confusion is from the cardiac problems. Pt to be evaluated for MV repair. 5/15 very poor memory and cognitive decline in this pt as he could not remember 4 objects one minute later. He has good right sided mental abilities and good spacial ability. We do not know how long or fast this cognitive decline has been in play. Will have to try to reach out to family members, he cannot recall being treated for his lymphoma. Ct head without contrast. Renal function is poor, so that we are limited with ct of the brain and body without contrast. ? multiinfarct dementia neuro cognitive studies check B12 and tsh levels.  pt is stable and neuro evaluation proceeding with scans of the head.  pt had ct of the abdomen and pelvis and chest and the nodes in the chest appear inconsequential. The spleen is 15.9 cm minimally enlarged and liver is read and slightly enlarged with normal liver function studies. Considering the history I do not think these findings warrant a search for a lymphoma.  pt was stable and workup continues and decisions on heart pending.  stable and notes reviewed in detail labs noted and decisions on heart procedures are pending.  pt is stable and cardiac workup decisions to be finalized.  pt is stable and no evidence of lymphoma on exam. He will have neuro cognitive studies as a outpt.  MEDICATIONS  (STANDING):  allopurinol 300 milliGRAM(s) Oral daily  aspirin  chewable 81 milliGRAM(s) Oral daily  carvedilol 12.5 milliGRAM(s) Oral every 12 hours  dextrose 5%. 1000 milliLiter(s) (50 mL/Hr) IV Continuous <Continuous>  dextrose 50% Injectable 12.5 Gram(s) IV Push once  diVALproex  milliGRAM(s) Oral two times a day  docusate sodium 100 milliGRAM(s) Oral three times a day  folic acid 1 milliGRAM(s) Oral daily  furosemide   Injectable 80 milliGRAM(s) IV Push daily  heparin  Injectable 5000 Unit(s) SubCutaneous every 8 hours  hydrALAZINE 100 milliGRAM(s) Oral every 8 hours  insulin lispro (HumaLOG) corrective regimen sliding scale   SubCutaneous three times a day before meals  isosorbide   dinitrate Tablet (ISORDIL) 40 milliGRAM(s) Oral every 8 hours  melatonin 3 milliGRAM(s) Oral at bedtime  senna 2 Tablet(s) Oral at bedtime  spironolactone 25 milliGRAM(s) Oral daily        142  |  99  |  28<H>  ----------------------------<  90  3.5   |  30  |  1.83<H>    Ca    9.3      17 May 2019 07:06      Urinalysis Basic - ( 08 May 2019 00:17 )    Color: Yellow / Appearance: Slightly Turbid / S.023 / pH: x  Gluc: x / Ketone: Negative  / Bili: Small / Urobili: 3 mg/dL   Blood: x / Protein: 300 mg/dL / Nitrite: Negative   Leuk Esterase: Negative / RBC: 2 /hpf / WBC 7 /HPF   Sq Epi: x / Non Sq Epi: 1 /hpf / Bacteria: Negative        ROS:  Negative except for:    PAST MEDICAL & SURGICAL HISTORY:  Pleural effusion  Moderate mitral regurgitation  Systolic heart failure  Rhinitis, allergic  Essential hypertension  DM type 2 (diabetes mellitus, type 2)  Non-Hodgkins Lymphoma  Non-Hodgkin lymphoma: h/o axillary dissection      SOCIAL HISTORY:    FAMILY HISTORY:  Family history of non-Hodgkin's lymphoma (Sibling)  Family history of CHF (congestive heart failure)      Allergies    No Known Allergies    Intolerances        PHYSICAL EXAM  General: lying in the bed and not in distress  HEENT: stable   Neck: supple  CV: rr   Lungs: decreased breath sounds at the bases  Abdomen: soft non-tender non-distended, no hepatosplenomegaly  Ext: no clubbing cyanosis or edema  Skin: no rashes and no petechiae  Neuro: alert and oriented X3 no focal deficits    BLOOD SMEAR INTERPRETATION:    RADIOLOGY :

## 2019-05-23 NOTE — PROGRESS NOTE ADULT - ASSESSMENT
5/8 as the above record indicates, the pt is here and is a very poor historian. I was asked by Dr Hawkins from oncology and by the pt's internist to see the pt as he did have a past of lymphoma. The oncology office will have to look up records as his history is not readily available. When I came to see the pt he was not able to supply much info about his cancer other than he had lymphoma and did not know the type or who treated him. Dr Hawkins thinks he may have been treated by his previous associate, Dr Sarmiento.     At the time of my visit the pt was alert and oriented but was not able to give specific details about any of his medical issues. His chemistries appeared all unremarkable and he was not febrile and his ct of the head showed nothing of note.  I will see if there are any records on his previous onc history and see if there is any reason to think it may have contributed to his present admit here.     5/9 events of last day was noted and pt was intubated and had heart failure. 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 In 2010 he went to [facility] and was treated for a recurrence with BR and he has remained in remission.   5/10 still intubated and sedated and labs reviewed and thus far no direct evidence for lymphoma recurrence.  . 5/11 Pt still intubated and sedated. Anemia from icu anemia causes no evidence for recurrent lymphoma.   5/12 the pt was extubated on this date and hemoglobin was noted to be 11.0 will look when stable for any evidenced of recurrent lymphoma.   5/13 notes reviewed and pt still looked somewhat disoriented will check him for nodes in the am counts ok.  . 5/14 stable but confused ands seen by neuro who feels confusion is from the cardiac problems. Pt to be evaluated for MV repair.   5/15 very poor memory and cognitive decline in this pt as he could not remember 4 objects one minute later. He has good right sided mental abilities and good spacial ability. We do not know how long or fast this cognitive decline has been in play. Will have to try to reach out to family members, he cannot recall being treated for his lymphoma. Ct head without contrast. Renal function is poor, so that we are limited with ct of the brain and body without contrast. ? multiinfarct dementia neuro cognitive studies check B12 and tsh levels. . 5/16 pt is stable and neuro evaluation proceeding with scans of the head.  5/17 pt had ct of the abdomen and pelvis and chest and the nodes in the chest appear inconsequential. The spleen is 15.9 cm minimally enlarged and liver is read and slightly enlarged with normal liver function studies. Considering the history I do not think these findings warrant a search for a lymphoma.  5/20 pt was stable and workup continues and decisions on heart pending.  5/21 stable and notes reviewed in detail labs noted and decisions on heart procedures are pending.  5/22 pt is stable and cardiac workup decisions to be finalized. . 5/23 pt is stable and no evidence of lymphoma on exam. He will have neuro cognitive studies as a outpt.

## 2019-05-23 NOTE — DISCHARGE NOTE PROVIDER - PROVIDER TOKENS
PROVIDER:[TOKEN:[[id number]],FOLLOWUP:[2 weeks]] PROVIDER:[TOKEN:[[id number]],FOLLOWUP:[2 weeks]],PROVIDER:[TOKEN:[[id number]],FOLLOWUP:[2 weeks]] PROVIDER:[TOKEN:[[id number]],FOLLOWUP:[2 weeks]],PROVIDER:[TOKEN:[[id number]],FOLLOWUP:[2 weeks]],PROVIDER:[TOKEN:[[id number]],FOLLOWUP:[2 weeks]] PROVIDER:[TOKEN:[[id number]:[2 weeks]],PROVIDER:[TOKEN:[[id number]:[2 weeks]],PROVIDER:[TOKEN:[[id number]],FOLLOWUP:[2 weeks]],PROVIDER:[TOKEN:[[id number]],FOLLOWUP:[2 weeks]]

## 2019-05-23 NOTE — DISCHARGE NOTE PROVIDER - HOSPITAL COURSE
68 year old remote hx Non-Hodgkin's Lymphoma tx with chemotherapy in 2004, DM2 with CKD stage 3, HTN, CHF EF 35% on cardiac cath 12/ 2016, pleural effusion s/p left pleuracentesis in 10/2016, cardiomyopathy, gout and HLD presented to the ED sent in from cardiologists office because of EKG changes and AMS. Patient noted to be confused AOx1. Unclear hx of AMS with several weeks of AMS noted by PCP visit and worsened mental status to cardiologist visit. In ED troponin noted to mildly elevated in 50s. Admitted for encephalopathy of unclear etiology.          On the floor EKG abnormal found to have complete heart block with alternating RBBB with LBBB with HR 50's bpm. Previous cardiac workup, echo (Nov 2016) EF 44%, cardiac cath (Dec 2016) non obstructive CAD. Pt transferred to CCU2 for close observation with external pacer stand by in view of alternating bundle branch block.        In CCU2 pt A&O x1, initially pleasant and asymptomatic CHB. Shortly after arrival pt became agitated, sitting at edge of the bed c/o nausea and wanting to go home because he had to vomit. Pt began pulling his leads, pacer pads and all monitoring devices off. Ativan given and CIWA protocol initiated for possible withdrawal. Patient noted to desaturate intermittently to 70s-80s with subsequent intubation on 5/9 for worsening mental status and airway protection, transferred to CCU1 for transvenous pacing.        In CCU1 patient intially paced via TVP. Patient received ischemic workup with right and left heart cath showing pulmonary HTN, severe MR, and no significant coronary artery stenosis requiring intervention. Patient had micra placed 5/10 with TVP removed. Neurological workup grossly unremarkable with CT head showing chronic left parietal infarct and MR with gliosis. Patient extubated on 5/11 with mental status improved AOx2. Patient medically optimized for heart failure with diuresis, afterload reduction with nitro gtt transitioned off to hydralazine, isordil, lasix. Patient remained mildly agitated and confused placed on 1:1 with behavioral health consulted with recommendations to start depakote. CT surgery additionally consulted for possible MV repair, deferred at that time 2/2 encephalopathy.  Patient was transferred to the floor and HF followed during the work up for a MitraClip which is not being done as an inpatient – will consider BIV upgrade and possible Mitrclip during outpatient follow-up.  CT chest/abdomen/pelvis showed mild mediastinal and retroperitoneal lymphadenopathy, likely slightly improved compared to prior examination, hepatosplenomegaly, trace abdominal and pelvic ascites, bilateral pleural effusions, loculated in the right anterior chest.  Findings were noted by HemOnc who advised that the nodes in the chest appear inconsequential, the spleen is minimally enlarged and liver is slightly enlarged with normal liver function studies - does warrant a search for a lymphoma.  Regarding his mental status, now likely back to mental status baseline. 68 year old remote hx Non-Hodgkin's Lymphoma tx with chemotherapy in 2004, DM2 with CKD stage 3, HTN, CHF EF 35% on cardiac cath 12/ 2016, pleural effusion s/p left pleuracentesis in 10/2016, cardiomyopathy, gout and HLD presented to the ED sent in from cardiologists office because of EKG changes and AMS. Patient noted to be confused AOx1. Unclear hx of AMS with several weeks of AMS noted by PCP visit and worsened mental status to cardiologist visit. In ED troponin noted to mildly elevated in 50s. Admitted for encephalopathy of unclear etiology.          On the floor EKG abnormal found to have complete heart block with alternating RBBB with LBBB with HR 50's bpm. Previous cardiac workup, echo (Nov 2016) EF 44%, cardiac cath (Dec 2016) non obstructive CAD. Pt transferred to CCU2 for close observation with external pacer stand by in view of alternating bundle branch block.        In CCU2 pt A&O x1, initially pleasant and asymptomatic CHB. Shortly after arrival pt became agitated, sitting at edge of the bed c/o nausea and wanting to go home because he had to vomit. Pt began pulling his leads, pacer pads and all monitoring devices off. Ativan given and CIWA protocol initiated for possible withdrawal. Patient noted to desaturate intermittently to 70s-80s with subsequent intubation on 5/9 for worsening mental status and airway protection, transferred to CCU1 for transvenous pacing.        In CCU1 patient intially paced via TVP. Patient received ischemic workup with right and left heart cath showing pulmonary HTN, severe MR, and no significant coronary artery stenosis requiring intervention. Patient had micra placed 5/10 with TVP removed. Neurological workup grossly unremarkable with CT head showing chronic left parietal infarct and MR with gliosis. Patient extubated on 5/11 with mental status improved AOx2. Patient medically optimized for heart failure with diuresis, afterload reduction with nitro gtt transitioned off to hydralazine, isordil, lasix. Patient remained mildly agitated and confused placed on 1:1 with behavioral health consulted with recommendations to start depakote. CT surgery additionally consulted for possible MV repair, deferred at that time 2/2 encephalopathy.  Patient was transferred to the floor and HF followed during the work up for a MitraClip which is not being done as an inpatient – will consider BIV upgrade and possible Mitrclip during outpatient follow-up.  CT chest/abdomen/pelvis showed mild mediastinal and retroperitoneal lymphadenopathy, likely slightly improved compared to prior examination, hepatosplenomegaly, trace abdominal and pelvic ascites, bilateral pleural effusions, loculated in the right anterior chest.  Findings were noted by HemOnc who advised that the nodes in the chest appear inconsequential, the spleen is minimally enlarged and liver is slightly enlarged with normal liver function studies - does warrant a search for a lymphoma.  Regarding his mental status, now likely back to mental status baseline.  MRI brain unremarkable for acute strokes and dementia labs unremarkable.  Neurology recommended neurocognitive testing as outpatient for areas of fine deficit. 68 year old remote hx Non-Hodgkin's Lymphoma tx with chemotherapy in 2004, DM2 with CKD stage 3, HTN, CHF EF 35% on cardiac cath 12/ 2016, pleural effusion s/p left pleuracentesis in 10/2016, cardiomyopathy, gout and HLD presented to the ED sent in from cardiologists office because of EKG changes and AMS. Patient noted to be confused AOx1. Unclear hx of AMS with several weeks of AMS noted by PCP visit and worsened mental status to cardiologist visit. In ED troponin noted to mildly elevated in 50s. Admitted for encephalopathy of unclear etiology.          On the floor EKG abnormal found to have complete heart block with alternating RBBB with LBBB with HR 50's bpm. Previous cardiac workup, echo (Nov 2016) EF 44%, cardiac cath (Dec 2016) non obstructive CAD. Pt transferred to CCU2 for close observation with external pacer stand by in view of alternating bundle branch block.        In CCU2 pt A&O x1, initially pleasant and asymptomatic CHB. Shortly after arrival pt became agitated, sitting at edge of the bed c/o nausea and wanting to go home because he had to vomit. Pt began pulling his leads, pacer pads and all monitoring devices off. Ativan given and CIWA protocol initiated for possible withdrawal. Patient noted to desaturate intermittently to 70s-80s with subsequent intubation on 5/9 for worsening mental status and airway protection, transferred to CCU1 for transvenous pacing.        In CCU1 patient intially paced via TVP. Patient received ischemic workup with right and left heart cath showing pulmonary HTN, severe MR, and no significant coronary artery stenosis requiring intervention. Patient had micra placed 5/10 with TVP removed. Neurological workup grossly unremarkable with CT head showing chronic left parietal infarct and MR with gliosis. Patient extubated on 5/11 with mental status improved AOx2. Patient medically optimized for heart failure with diuresis, afterload reduction with nitro gtt transitioned off to hydralazine, isordil, lasix. Patient remained mildly agitated and confused placed on 1:1 with behavioral health consulted with recommendations to start depakote. CT surgery additionally consulted for possible MV repair, deferred at that time 2/2 encephalopathy.  Patient was transferred to the floor and HF followed during the work up for a MitraClip which is not being done as an inpatient – will consider BIV upgrade and possible Mitrclip during outpatient follow-up.  CT chest/abdomen/pelvis showed mild mediastinal and retroperitoneal lymphadenopathy, likely slightly improved compared to prior examination, hepatosplenomegaly, trace abdominal and pelvic ascites, bilateral pleural effusions, loculated in the right anterior chest.  Findings were noted by HemOnc who advised that the nodes in the chest appear inconsequential, the spleen is minimally enlarged and liver is slightly enlarged with normal liver function studies - does warrant a search for a lymphoma.  Regarding his mental status, now likely back to mental status baseline.  MRI brain unremarkable for acute strokes and dementia labs unremarkable.  Neurology recommended neurocognitive testing as outpatient for areas of fine deficit.                     c 68 year old remote hx Non-Hodgkin's Lymphoma tx with chemotherapy in 2004, DM2 with CKD stage 3, HTN, CHF EF 35% on cardiac cath 12/ 2016, pleural effusion s/p left pleuracentesis in 10/2016, cardiomyopathy, gout and HLD presented to the ED sent in from cardiologists office because of EKG changes and AMS. Patient noted to be confused AOx1. Unclear hx of AMS with several weeks of AMS noted by PCP visit and worsened mental status to cardiologist visit. In ED troponin noted to mildly elevated in 50s. Admitted for encephalopathy of unclear etiology.          On the floor EKG abnormal found to have complete heart block with alternating RBBB with LBBB with HR 50's bpm. Previous cardiac workup, echo (Nov 2016) EF 44%, cardiac cath (Dec 2016) non obstructive CAD. Pt transferred to CCU2 for close observation with external pacer stand by in view of alternating bundle branch block.        In CCU2 pt A&O x1, initially pleasant and asymptomatic CHB. Shortly after arrival pt became agitated, sitting at edge of the bed c/o nausea and wanting to go home because he had to vomit. Pt began pulling his leads, pacer pads and all monitoring devices off. Ativan given and CIWA protocol initiated for possible withdrawal. Patient noted to desaturate intermittently to 70s-80s with subsequent intubation on 5/9 for worsening mental status and airway protection, transferred to CCU1 for transvenous pacing.        In CCU1 patient intially paced via TVP. Patient received ischemic workup with right and left heart cath showing pulmonary HTN, severe MR, and no significant coronary artery stenosis requiring intervention. Patient had micra placed 5/10 with TVP removed. Neurological workup grossly unremarkable with CT head showing chronic left parietal infarct and MR with gliosis. Patient extubated on 5/11 with mental status improved AOx2. Patient medically optimized for heart failure with diuresis, afterload reduction with nitro gtt transitioned off to hydralazine, isordil, lasix. Patient remained mildly agitated and confused placed on 1:1 with behavioral health consulted with recommendations to start depakote. CT surgery additionally consulted for possible MV repair, deferred at that time 2/2 encephalopathy.  Patient was transferred to the floor and HF followed during the work up for a MitraClip which is not being done as an inpatient – will consider BIV upgrade and possible Mitrclip during outpatient follow-up.  CT chest/abdomen/pelvis showed mild mediastinal and retroperitoneal lymphadenopathy, likely slightly improved compared to prior examination, hepatosplenomegaly, trace abdominal and pelvic ascites, bilateral pleural effusions, loculated in the right anterior chest.  Findings were noted by HemOnc who advised that the nodes in the chest appear inconsequential, the spleen is minimally enlarged and liver is slightly enlarged with normal liver function studies - does warrant a search for a lymphoma.  Regarding his mental status, now likely back to mental status baseline.  MRI brain unremarkable for acute strokes and dementia labs unremarkable.  Neurology recommended neurocognitive testing as outpatient for areas of fine deficit. Patient follow up with structural heart/ cardiology outpatiwnt                      c 68 year old remote hx Non-Hodgkin's Lymphoma tx with chemotherapy in 2004, DM2 with CKD stage 3, HTN, CHF EF 35% on cardiac cath 12/ 2016, pleural effusion s/p left pleuracentesis in 10/2016, cardiomyopathy, gout and HLD presented to the ED sent in from cardiologists office because of EKG changes and AMS. Patient noted to be confused AOx1. Unclear hx of AMS with several weeks of AMS noted by PCP visit and worsened mental status to cardiologist visit. In ED troponin noted to mildly elevated in 50s. Admitted for encephalopathy of unclear etiology.  resented from cardiologists office because of AMS and EKG concerning for 2:1 AVB. Tele also with CHB s/p Micra. Course complicated by AMS requiring intubation and TVP. S/p LHC/RHC 5/10. Extubated on 5/12, required diuresis and nitro gtt, currently on BiPAP. KUSHAL 5/10 with EF 45%, posteriorly directed MR, likely severe.         On the floor EKG abnormal found to have complete heart block with alternating RBBB with LBBB with HR 50's bpm. Previous cardiac workup, echo (Nov 2016) EF 44%, cardiac cath (Dec 2016) non obstructive CAD. Pt transferred to CCU2 for close observation with external pacer stand by in view of alternating bundle branch block.        In CCU2 pt A&O x1, initially pleasant and asymptomatic CHB. Shortly after arrival pt became agitated, sitting at edge of the bed c/o nausea and wanting to go home because he had to vomit. Pt began pulling his leads, pacer pads and all monitoring devices off. Ativan given and CIWA protocol initiated for possible withdrawal. Patient noted to desaturate intermittently to 70s-80s with subsequent intubation on 5/9 for worsening mental status and airway protection, transferred to CCU1 for transvenous pacing.        In CCU1 patient intially paced via TVP. Patient received ischemic workup with right and left heart cath showing pulmonary HTN, severe MR, and no significant coronary artery stenosis requiring intervention. Patient had micra placed 5/10 with TVP removed. Neurological workup grossly unremarkable with CT head showing chronic left parietal infarct and MR with gliosis. Patient extubated on 5/11 with mental status improved AOx2. Patient medically optimized for heart failure with diuresis, afterload reduction with nitro gtt transitioned off to hydralazine, isordil, lasix. Patient remained mildly agitated and confused placed on 1:1 with behavioral health consulted with recommendations to start depakote. CT surgery additionally consulted for possible MV repair, deferred at that time 2/2 encephalopathy.  Patient was transferred to the floor and HF followed during the work up for a MitraClip which is not being done as an inpatient – will consider BIV upgrade and possible Mitrclip during outpatient follow-up.  CT chest/abdomen/pelvis showed mild mediastinal and retroperitoneal lymphadenopathy, likely slightly improved compared to prior examination, hepatosplenomegaly, trace abdominal and pelvic ascites, bilateral pleural effusions, loculated in the right anterior chest.  Findings were noted by HemOnc who advised that the nodes in the chest appear inconsequential, the spleen is minimally enlarged and liver is slightly enlarged with normal liver function studies - does warrant a search for a lymphoma.  Regarding his mental status, now likely back to mental status baseline.  MRI brain unremarkable for acute strokes and dementia labs unremarkable.  Neurology recommended neurocognitive testing as outpatient for areas of fine deficit. Patient follow up with structural heart/ cardiology outpatiwnt 68 year old remote hx Non-Hodgkin's Lymphoma tx with chemotherapy in 2004, DM2 with CKD stage 3, HTN, CHF EF 35% on cardiac cath 12/ 2016, pleural effusion s/p left pleuracentesis in 10/2016, cardiomyopathy, gout and HLD presented to the ED sent in from cardiologists office because of EKG changes and AMS. Patient noted to be confused AOx1. Unclear hx of AMS with several weeks of AMS noted by PCP visit and worsened mental status to cardiologist visit. In ED troponin noted to mildly elevated in 50s. Admitted for encephalopathy of unclear etiology.  resented from cardiologists office because of AMS and EKG concerning for 2:1 AVB. Tele also with CHB s/p Micra. Course complicated by AMS requiring intubation and TVP. S/p LHC/RHC 5/10. Extubated on 5/12, required diuresis and nitro gtt, currently on BiPAP. KUSHAL 5/10 with EF 45%, posteriorly directed MR, likely severe.         On the floor EKG abnormal found to have complete heart block with alternating RBBB with LBBB with HR 50's bpm. Previous cardiac workup, echo (Nov 2016) EF 44%, cardiac cath (Dec 2016) non obstructive CAD. Pt transferred to CCU2 for close observation with external pacer stand by in view of alternating bundle branch block.        In CCU2 pt A&O x1, initially pleasant and asymptomatic CHB. Shortly after arrival pt became agitated, sitting at edge of the bed c/o nausea and wanting to go home because he had to vomit. Pt began pulling his leads, pacer pads and all monitoring devices off. Ativan given and CIWA protocol initiated for possible withdrawal. Patient noted to desaturate intermittently to 70s-80s with subsequent intubation on 5/9 for worsening mental status and airway protection, transferred to CCU1 for transvenous pacing.        In CCU1 patient intially paced via TVP. Patient received ischemic workup with right and left heart cath showing pulmonary HTN, severe MR, and no significant coronary artery stenosis requiring intervention. Patient had micra placed 5/10 with TVP removed. Neurological workup grossly unremarkable with CT head showing chronic left parietal infarct and MR with gliosis. Patient extubated on 5/11 with mental status improved AOx2. Patient medically optimized for heart failure with diuresis, afterload reduction with nitro gtt transitioned off to hydralazine, isordil, lasix. Patient remained mildly agitated and confused placed on 1:1 with behavioral health consulted with recommendations to start depakote. CT surgery additionally consulted for possible MV repair, deferred at that time 2/2 encephalopathy.  Patient was transferred to the floor and HF followed during the work up for a MitraClip which is not being done as an inpatient – will consider BIV upgrade and possible Mitrclip during outpatient follow-up.  CT chest/abdomen/pelvis showed mild mediastinal and retroperitoneal lymphadenopathy, likely slightly improved compared to prior examination, hepatosplenomegaly, trace abdominal and pelvic ascites, bilateral pleural effusions, loculated in the right anterior chest.  Findings were noted by HemOnc who advised that the nodes in the chest appear inconsequential, the spleen is minimally enlarged and liver is slightly enlarged with normal liver function studies - does warrant a search for a lymphoma.  Regarding his mental status, now likely back to mental status baseline.  MRI brain unremarkable for acute strokes and dementia labs unremarkable.  Neurology recommended neurocognitive testing as outpatient for areas of fine deficit. Patient follow up with structural heart/ cardiology outpatient

## 2019-05-23 NOTE — PROGRESS NOTE ADULT - SUBJECTIVE AND OBJECTIVE BOX
Patient is a 69y old  Male who presents with a chief complaint of confusion (23 May 2019 17:22)      INTERVAL HPI/OVERNIGHT EVENTS: noted, feels well      Vital Signs Last 24 Hrs  T(C): 36.4 (23 May 2019 12:12), Max: 36.9 (22 May 2019 20:52)  T(F): 97.5 (23 May 2019 12:12), Max: 98.4 (22 May 2019 20:52)  HR: 59 (23 May 2019 12:12) (59 - 62)  BP: 123/64 (23 May 2019 12:12) (123/64 - 131/71)  BP(mean): --  RR: 18 (23 May 2019 12:12) (18 - 18)  SpO2: 97% (23 May 2019 12:12) (96% - 97%)    ALBUTerol/ipratropium for Nebulization 3 milliLiter(s) Nebulizer every 6 hours PRN  allopurinol 300 milliGRAM(s) Oral daily  aspirin  chewable 81 milliGRAM(s) Oral daily  carvedilol 12.5 milliGRAM(s) Oral every 12 hours  dextrose 40% Gel 15 Gram(s) Oral once PRN  dextrose 5%. 1000 milliLiter(s) IV Continuous <Continuous>  dextrose 50% Injectable 12.5 Gram(s) IV Push once  diVALproex  milliGRAM(s) Oral two times a day  docusate sodium 100 milliGRAM(s) Oral three times a day  folic acid 1 milliGRAM(s) Oral daily  furosemide    Tablet 80 milliGRAM(s) Oral daily  glucagon  Injectable 1 milliGRAM(s) IntraMuscular once PRN  heparin  Injectable 5000 Unit(s) SubCutaneous every 8 hours  hydrALAZINE 100 milliGRAM(s) Oral every 8 hours  insulin lispro (HumaLOG) corrective regimen sliding scale   SubCutaneous three times a day before meals  isosorbide   dinitrate Tablet (ISORDIL) 40 milliGRAM(s) Oral every 8 hours  melatonin 3 milliGRAM(s) Oral at bedtime  Nephro-kwaku 1 Tablet(s) Oral daily  senna 2 Tablet(s) Oral at bedtime  sodium chloride 0.9% lock flush 10 milliLiter(s) IV Push every 1 hour PRN  spironolactone 25 milliGRAM(s) Oral daily  valproate sodium IVPB 125 milliGRAM(s) IV Intermittent every 8 hours PRN      PHYSICAL EXAM:  GENERAL: NAD,   EYES: conjunctiva and sclera clear  ENMT: Moist mucous membranes  NECK: Supple, No JVD, Normal thyroid  NERVOUS SYSTEM:  Alert & Oriented X1-2,   CHEST/LUNG: Clear to auscultation bilaterally; No rales, rhonchi, wheezing, or rubs  HEART: Regular rate and rhythm; No murmurs, rubs, or gallops  ABDOMEN: Soft, Nontender, Nondistended; Bowel sounds present  EXTREMITIES:  2+ Peripheral Pulses, No clubbing, cyanosis, or edema  LYMPH: No lymphadenopathy noted  SKIN: No rashes or lesions    Consultant(s) Notes Reviewed:  [x ] YES  [ ] NO  Care Discussed with Consultants/Other Providers [ x] YES  [ ] NO    LABS:    05-23    138  |  94<L>  |  46<H>  ----------------------------<  100<H>  4.1   |  29  |  1.59<H>    Ca    9.3      23 May 2019 07:08          CAPILLARY BLOOD GLUCOSE      POCT Blood Glucose.: 128 mg/dL (23 May 2019 17:59)  POCT Blood Glucose.: 136 mg/dL (23 May 2019 12:56)  POCT Blood Glucose.: 130 mg/dL (23 May 2019 08:46)  POCT Blood Glucose.: 140 mg/dL (22 May 2019 21:57)            RADIOLOGY & ADDITIONAL TESTS:    Imaging Personally Reviewed:  [ x] YES  [ ] NO

## 2019-05-24 VITALS
SYSTOLIC BLOOD PRESSURE: 126 MMHG | DIASTOLIC BLOOD PRESSURE: 81 MMHG | OXYGEN SATURATION: 96 % | RESPIRATION RATE: 18 BRPM | TEMPERATURE: 98 F | HEART RATE: 61 BPM

## 2019-05-24 LAB
ANION GAP SERPL CALC-SCNC: 13 MMOL/L — SIGNIFICANT CHANGE UP (ref 5–17)
BUN SERPL-MCNC: 55 MG/DL — HIGH (ref 7–23)
CALCIUM SERPL-MCNC: 9.3 MG/DL — SIGNIFICANT CHANGE UP (ref 8.4–10.5)
CHLORIDE SERPL-SCNC: 95 MMOL/L — LOW (ref 96–108)
CO2 SERPL-SCNC: 31 MMOL/L — SIGNIFICANT CHANGE UP (ref 22–31)
CREAT SERPL-MCNC: 1.79 MG/DL — HIGH (ref 0.5–1.3)
GLUCOSE BLDC GLUCOMTR-MCNC: 112 MG/DL — HIGH (ref 70–99)
GLUCOSE BLDC GLUCOMTR-MCNC: 121 MG/DL — HIGH (ref 70–99)
GLUCOSE BLDC GLUCOMTR-MCNC: 126 MG/DL — HIGH (ref 70–99)
GLUCOSE SERPL-MCNC: 105 MG/DL — HIGH (ref 70–99)
POTASSIUM SERPL-MCNC: 3.6 MMOL/L — SIGNIFICANT CHANGE UP (ref 3.5–5.3)
POTASSIUM SERPL-SCNC: 3.6 MMOL/L — SIGNIFICANT CHANGE UP (ref 3.5–5.3)
SODIUM SERPL-SCNC: 139 MMOL/L — SIGNIFICANT CHANGE UP (ref 135–145)

## 2019-05-24 PROCEDURE — 83010 ASSAY OF HAPTOGLOBIN QUANT: CPT

## 2019-05-24 PROCEDURE — 84100 ASSAY OF PHOSPHORUS: CPT

## 2019-05-24 PROCEDURE — 93306 TTE W/DOPPLER COMPLETE: CPT

## 2019-05-24 PROCEDURE — C1894: CPT

## 2019-05-24 PROCEDURE — 82746 ASSAY OF FOLIC ACID SERUM: CPT

## 2019-05-24 PROCEDURE — 82607 VITAMIN B-12: CPT

## 2019-05-24 PROCEDURE — 84484 ASSAY OF TROPONIN QUANT: CPT

## 2019-05-24 PROCEDURE — 97116 GAIT TRAINING THERAPY: CPT

## 2019-05-24 PROCEDURE — 82553 CREATINE MB FRACTION: CPT

## 2019-05-24 PROCEDURE — 85730 THROMBOPLASTIN TIME PARTIAL: CPT

## 2019-05-24 PROCEDURE — 99285 EMERGENCY DEPT VISIT HI MDM: CPT | Mod: 25

## 2019-05-24 PROCEDURE — 71250 CT THORAX DX C-: CPT

## 2019-05-24 PROCEDURE — 82140 ASSAY OF AMMONIA: CPT

## 2019-05-24 PROCEDURE — 85027 COMPLETE CBC AUTOMATED: CPT

## 2019-05-24 PROCEDURE — C1769: CPT

## 2019-05-24 PROCEDURE — 93325 DOPPLER ECHO COLOR FLOW MAPG: CPT

## 2019-05-24 PROCEDURE — 94660 CPAP INITIATION&MGMT: CPT

## 2019-05-24 PROCEDURE — 94640 AIRWAY INHALATION TREATMENT: CPT

## 2019-05-24 PROCEDURE — 85014 HEMATOCRIT: CPT

## 2019-05-24 PROCEDURE — 83735 ASSAY OF MAGNESIUM: CPT

## 2019-05-24 PROCEDURE — 96374 THER/PROPH/DIAG INJ IV PUSH: CPT

## 2019-05-24 PROCEDURE — 80307 DRUG TEST PRSMV CHEM ANLYZR: CPT

## 2019-05-24 PROCEDURE — 71045 X-RAY EXAM CHEST 1 VIEW: CPT

## 2019-05-24 PROCEDURE — 93312 ECHO TRANSESOPHAGEAL: CPT

## 2019-05-24 PROCEDURE — 83540 ASSAY OF IRON: CPT

## 2019-05-24 PROCEDURE — 93320 DOPPLER ECHO COMPLETE: CPT

## 2019-05-24 PROCEDURE — 81001 URINALYSIS AUTO W/SCOPE: CPT

## 2019-05-24 PROCEDURE — 76700 US EXAM ABDOM COMPLETE: CPT

## 2019-05-24 PROCEDURE — 86780 TREPONEMA PALLIDUM: CPT

## 2019-05-24 PROCEDURE — 83615 LACTATE (LD) (LDH) ENZYME: CPT

## 2019-05-24 PROCEDURE — 70450 CT HEAD/BRAIN W/O DYE: CPT

## 2019-05-24 PROCEDURE — 97530 THERAPEUTIC ACTIVITIES: CPT

## 2019-05-24 PROCEDURE — 95819 EEG AWAKE AND ASLEEP: CPT

## 2019-05-24 PROCEDURE — C1786: CPT

## 2019-05-24 PROCEDURE — 86618 LYME DISEASE ANTIBODY: CPT

## 2019-05-24 PROCEDURE — 84295 ASSAY OF SERUM SODIUM: CPT

## 2019-05-24 PROCEDURE — 70551 MRI BRAIN STEM W/O DYE: CPT

## 2019-05-24 PROCEDURE — 83605 ASSAY OF LACTIC ACID: CPT

## 2019-05-24 PROCEDURE — 82947 ASSAY GLUCOSE BLOOD QUANT: CPT

## 2019-05-24 PROCEDURE — 93005 ELECTROCARDIOGRAM TRACING: CPT

## 2019-05-24 PROCEDURE — 82435 ASSAY OF BLOOD CHLORIDE: CPT

## 2019-05-24 PROCEDURE — 82550 ASSAY OF CK (CPK): CPT

## 2019-05-24 PROCEDURE — 84443 ASSAY THYROID STIM HORMONE: CPT

## 2019-05-24 PROCEDURE — 80053 COMPREHEN METABOLIC PANEL: CPT

## 2019-05-24 PROCEDURE — 83550 IRON BINDING TEST: CPT

## 2019-05-24 PROCEDURE — 82962 GLUCOSE BLOOD TEST: CPT

## 2019-05-24 PROCEDURE — 83036 HEMOGLOBIN GLYCOSYLATED A1C: CPT

## 2019-05-24 PROCEDURE — 80202 ASSAY OF VANCOMYCIN: CPT

## 2019-05-24 PROCEDURE — 97162 PT EVAL MOD COMPLEX 30 MIN: CPT

## 2019-05-24 PROCEDURE — 82330 ASSAY OF CALCIUM: CPT

## 2019-05-24 PROCEDURE — 71046 X-RAY EXAM CHEST 2 VIEWS: CPT

## 2019-05-24 PROCEDURE — 97110 THERAPEUTIC EXERCISES: CPT

## 2019-05-24 PROCEDURE — 95951: CPT

## 2019-05-24 PROCEDURE — 33274 TCAT INSJ/RPL PERM LDLS PM: CPT

## 2019-05-24 PROCEDURE — 82803 BLOOD GASES ANY COMBINATION: CPT

## 2019-05-24 PROCEDURE — 87040 BLOOD CULTURE FOR BACTERIA: CPT

## 2019-05-24 PROCEDURE — C1887: CPT

## 2019-05-24 PROCEDURE — 94003 VENT MGMT INPAT SUBQ DAY: CPT

## 2019-05-24 PROCEDURE — 74176 CT ABD & PELVIS W/O CONTRAST: CPT

## 2019-05-24 PROCEDURE — 93460 R&L HRT ART/VENTRICLE ANGIO: CPT

## 2019-05-24 PROCEDURE — 80048 BASIC METABOLIC PNL TOTAL CA: CPT

## 2019-05-24 PROCEDURE — 85610 PROTHROMBIN TIME: CPT

## 2019-05-24 PROCEDURE — 84132 ASSAY OF SERUM POTASSIUM: CPT

## 2019-05-24 RX ORDER — CARVEDILOL PHOSPHATE 80 MG/1
1 CAPSULE, EXTENDED RELEASE ORAL
Qty: 60 | Refills: 0
Start: 2019-05-24 | End: 2019-06-22

## 2019-05-24 RX ORDER — DIVALPROEX SODIUM 500 MG/1
1 TABLET, DELAYED RELEASE ORAL
Qty: 60 | Refills: 0
Start: 2019-05-24 | End: 2019-06-22

## 2019-05-24 RX ORDER — FUROSEMIDE 40 MG
1 TABLET ORAL
Qty: 0 | Refills: 0 | DISCHARGE

## 2019-05-24 RX ORDER — SENNA PLUS 8.6 MG/1
2 TABLET ORAL
Qty: 0 | Refills: 0 | DISCHARGE
Start: 2019-05-24

## 2019-05-24 RX ORDER — HYDRALAZINE HCL 50 MG
1 TABLET ORAL
Qty: 90 | Refills: 0
Start: 2019-05-24 | End: 2019-06-22

## 2019-05-24 RX ORDER — METOPROLOL TARTRATE 50 MG
1 TABLET ORAL
Qty: 0 | Refills: 0 | DISCHARGE

## 2019-05-24 RX ORDER — LANOLIN ALCOHOL/MO/W.PET/CERES
1 CREAM (GRAM) TOPICAL
Qty: 0 | Refills: 0 | DISCHARGE
Start: 2019-05-24

## 2019-05-24 RX ORDER — DOCUSATE SODIUM 100 MG
1 CAPSULE ORAL
Qty: 0 | Refills: 0 | DISCHARGE
Start: 2019-05-24

## 2019-05-24 RX ORDER — FUROSEMIDE 40 MG
1 TABLET ORAL
Qty: 30 | Refills: 0
Start: 2019-05-24 | End: 2019-06-22

## 2019-05-24 RX ORDER — FOLIC ACID 0.8 MG
1 TABLET ORAL
Qty: 0 | Refills: 0 | DISCHARGE
Start: 2019-05-24

## 2019-05-24 RX ORDER — IRBESARTAN 75 MG/1
1 TABLET ORAL
Qty: 0 | Refills: 0 | DISCHARGE

## 2019-05-24 RX ORDER — ISOSORBIDE DINITRATE 5 MG/1
1 TABLET ORAL
Qty: 90 | Refills: 0
Start: 2019-05-24 | End: 2019-06-22

## 2019-05-24 RX ADMIN — Medication 1 MILLIGRAM(S): at 14:02

## 2019-05-24 RX ADMIN — Medication 100 MILLIGRAM(S): at 05:54

## 2019-05-24 RX ADMIN — Medication 80 MILLIGRAM(S): at 05:54

## 2019-05-24 RX ADMIN — CARVEDILOL PHOSPHATE 12.5 MILLIGRAM(S): 80 CAPSULE, EXTENDED RELEASE ORAL at 17:45

## 2019-05-24 RX ADMIN — Medication 1 TABLET(S): at 09:44

## 2019-05-24 RX ADMIN — HEPARIN SODIUM 5000 UNIT(S): 5000 INJECTION INTRAVENOUS; SUBCUTANEOUS at 05:53

## 2019-05-24 RX ADMIN — Medication 100 MILLIGRAM(S): at 14:01

## 2019-05-24 RX ADMIN — Medication 100 MILLIGRAM(S): at 14:02

## 2019-05-24 RX ADMIN — Medication 81 MILLIGRAM(S): at 09:44

## 2019-05-24 RX ADMIN — DIVALPROEX SODIUM 250 MILLIGRAM(S): 500 TABLET, DELAYED RELEASE ORAL at 05:54

## 2019-05-24 RX ADMIN — DIVALPROEX SODIUM 250 MILLIGRAM(S): 500 TABLET, DELAYED RELEASE ORAL at 17:44

## 2019-05-24 RX ADMIN — SPIRONOLACTONE 25 MILLIGRAM(S): 25 TABLET, FILM COATED ORAL at 05:54

## 2019-05-24 RX ADMIN — Medication 300 MILLIGRAM(S): at 14:01

## 2019-05-24 RX ADMIN — ISOSORBIDE DINITRATE 40 MILLIGRAM(S): 5 TABLET ORAL at 14:02

## 2019-05-24 RX ADMIN — CARVEDILOL PHOSPHATE 12.5 MILLIGRAM(S): 80 CAPSULE, EXTENDED RELEASE ORAL at 09:44

## 2019-05-24 RX ADMIN — ISOSORBIDE DINITRATE 40 MILLIGRAM(S): 5 TABLET ORAL at 05:54

## 2019-05-24 NOTE — PROGRESS NOTE ADULT - ASSESSMENT
69 year old man with prior NHL (treated with R-CHOP 2004 and BR in 2010), chronic HFrEF (likely 2/2 Doxorubicin, EF:45%, decreased RVSF) who was sent to the ED on 5/8 with AMS and heart block - he progressed to complete heart block with encephalopathy requiring intubation and a leadless pacemaker with severe MR and CHF with STEPHEN on ckd III     1- STEPHEN on ckd III  2- CHF  3- HTN  4- hx gout       creatinine rising again   hydralazine 100 mg tid   lasix to 80 mg po daily and aldactone 25mg qd if cr rises further decrease lasix   daily weights   allopurinol 300 mg daily

## 2019-05-24 NOTE — PROGRESS NOTE ADULT - ASSESSMENT
5/8 as the above record indicates, the pt is here and is a very poor historian. I was asked by Dr Hawkins from oncology and by the pt's internist to see the pt as he did have a past of lymphoma. The oncology office will have to look up records as his history is not readily available. When I came to see the pt he was not able to supply much info about his cancer other than he had lymphoma and did not know the type or who treated him. Dr Hawkins thinks he may have been treated by his previous associate, Dr Sarmiento.     At the time of my visit the pt was alert and oriented but was not able to give specific details about any of his medical issues. His chemistries appeared all unremarkable and he was not febrile and his ct of the head showed nothing of note.  I will see if there are any records on his previous onc history and see if there is any reason to think it may have contributed to his present admit here.     5/9 events of last day was noted and pt was intubated and had heart failure. 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 In 2010 he went to [facility] and was treated for a recurrence with BR and he has remained in remission.   5/10 still intubated and sedated and labs reviewed and thus far no direct evidence for lymphoma recurrence.  . 5/11 Pt still intubated and sedated. Anemia from icu anemia causes no evidence for recurrent lymphoma.   5/12 the pt was extubated on this date and hemoglobin was noted to be 11.0 will look when stable for any evidenced of recurrent lymphoma.   5/13 notes reviewed and pt still looked somewhat disoriented will check him for nodes in the am counts ok.  . 5/14 stable but confused ands seen by neuro who feels confusion is from the cardiac problems. Pt to be evaluated for MV repair.   5/15 very poor memory and cognitive decline in this pt as he could not remember 4 objects one minute later. He has good right sided mental abilities and good spacial ability. We do not know how long or fast this cognitive decline has been in play. Will have to try to reach out to family members, he cannot recall being treated for his lymphoma. Ct head without contrast. Renal function is poor, so that we are limited with ct of the brain and body without contrast. ? multiinfarct dementia neuro cognitive studies check B12 and tsh levels. . 5/16 pt is stable and neuro evaluation proceeding with scans of the head.  5/17 pt had ct of the abdomen and pelvis and chest and the nodes in the chest appear inconsequential. The spleen is 15.9 cm minimally enlarged and liver is read and slightly enlarged with normal liver function studies. Considering the history I do not think these findings warrant a search for a lymphoma.  5/20 pt was stable and workup continues and decisions on heart pending.  5/21 stable and notes reviewed in detail labs noted and decisions on heart procedures are pending.  5/22 pt is stable and cardiac workup decisions to be finalized. . 5/23 pt is stable and no evidence of lymphoma on exam. He will have neuro cognitive studies as a outpt. 5/24 pt will be going home on this date and will follow with the cardiologists and no surgery is to be done at least for now. He will follow up with Dr Hawkins's team as a outpt.

## 2019-05-24 NOTE — PROGRESS NOTE ADULT - PROVIDER SPECIALTY LIST ADULT
Anesthesia
CCU
Cardiology
Electrophysiology
Heart Failure
Heme/Onc
Infectious Disease
Infectious Disease
Internal Medicine
Nephrology
Neurology
Structural Heart
Heme/Onc
Infectious Disease
Heart Failure

## 2019-05-24 NOTE — PROGRESS NOTE ADULT - SUBJECTIVE AND OBJECTIVE BOX
[facility]   [phone]  RENAL FOLLOW UP NOTE  --------------------------------------------------------------------------------  Chief Complaint:    24 hour events/subjective:    seen  still confused but answers questions     PAST HISTORY  --------------------------------------------------------------------------------  No significant changes to PMH, PSH, FHx, SHx, unless otherwise noted    ALLERGIES & MEDICATIONS  --------------------------------------------------------------------------------  Allergies    No Known Allergies    Intolerances      Standing Inpatient Medications  allopurinol 300 milliGRAM(s) Oral daily  aspirin  chewable 81 milliGRAM(s) Oral daily  carvedilol 12.5 milliGRAM(s) Oral every 12 hours  dextrose 5%. 1000 milliLiter(s) IV Continuous <Continuous>  dextrose 50% Injectable 12.5 Gram(s) IV Push once  diVALproex  milliGRAM(s) Oral two times a day  docusate sodium 100 milliGRAM(s) Oral three times a day  folic acid 1 milliGRAM(s) Oral daily  furosemide    Tablet 80 milliGRAM(s) Oral daily  heparin  Injectable 5000 Unit(s) SubCutaneous every 8 hours  hydrALAZINE 100 milliGRAM(s) Oral every 8 hours  insulin lispro (HumaLOG) corrective regimen sliding scale   SubCutaneous three times a day before meals  isosorbide   dinitrate Tablet (ISORDIL) 40 milliGRAM(s) Oral every 8 hours  melatonin 3 milliGRAM(s) Oral at bedtime  Nephro-kwaku 1 Tablet(s) Oral daily  senna 2 Tablet(s) Oral at bedtime  spironolactone 25 milliGRAM(s) Oral daily    PRN Inpatient Medications  ALBUTerol/ipratropium for Nebulization 3 milliLiter(s) Nebulizer every 6 hours PRN  dextrose 40% Gel 15 Gram(s) Oral once PRN  glucagon  Injectable 1 milliGRAM(s) IntraMuscular once PRN  sodium chloride 0.9% lock flush 10 milliLiter(s) IV Push every 1 hour PRN  valproate sodium IVPB 125 milliGRAM(s) IV Intermittent every 8 hours PRN      REVIEW OF SYSTEMS  --------------------------------------------------------------------------------    Gen: denies , fevers/chills,  CVS: denies chest pain/palpitations  Resp: denies SOB/Cough  GI: Denies N/V/Abd pain  : Denies dysuria/oliguria/hematuria    All other systems were reviewed and are negative, except as noted.    VITALS/PHYSICAL EXAM  --------------------------------------------------------------------------------  T(C): 36.6 (05-24-19 @ 04:49), Max: 36.6 (05-24-19 @ 04:49)  HR: 62 (05-24-19 @ 04:49) (59 - 62)  BP: 123/57 (05-24-19 @ 04:49) (123/57 - 130/64)  RR: 18 (05-24-19 @ 04:49) (18 - 18)  SpO2: 95% (05-24-19 @ 04:49) (93% - 97%)  Wt(kg): --        05-23-19 @ 07:01  -  05-24-19 @ 07:00  --------------------------------------------------------  IN: 300 mL / OUT: 0 mL / NET: 300 mL      Physical Exam:  	  Gen: Non toxic comfortable appearing forgetful    	no jvd   	Pulm: decrease bs  no rales or ronchi or wheezing  	CV: RRR, S1S2; no rub  	Abd: +BS, soft, nontender/nondistended  	: No suprapubic tenderness  	UE: Warm, no cyanosis  no clubbing,  no edema; no asterixis  	LE: Warm, no cyanosis  no clubbing, trace   edema    	  LABS/STUDIES  --------------------------------------------------------------------------------    139  |  95  |  55  ----------------------------<  105      [05-24-19 @ 06:16]  3.6   |  31  |  1.79        Ca     9.3     [05-24-19 @ 06:16]            Creatinine Trend:  SCr 1.79 [05-24 @ 06:16]  SCr 1.59 [05-23 @ 07:08]  SCr 1.83 [05-21 @ 07:05]  SCr 1.83 [05-20 @ 06:52]  SCr 1.87 [05-19 @ 06:13]              Urinalysis - [05-10-19 @ 00:35]      Color Light Yellow / Appearance Clear / SG 1.012 / pH 6.0      Gluc Negative / Ketone Negative  / Bili Negative / Urobili Negative       Blood Moderate / Protein 30 mg/dL / Leuk Est Small / Nitrite Negative      RBC 1 / WBC 1 / Hyaline  / Gran  / Sq Epi  / Non Sq Epi 1 / Bacteria       Iron 27, TIBC 311, %sat 9      [05-08-19 @ 15:41]  HbA1c 6.0      [05-09-19 @ 10:52]  TSH 3.07      [05-17-19 @ 09:16]    Syphilis Screen (Treponema Pallidum Ab) Negative      [05-08-19 @ 15:39]

## 2019-05-24 NOTE — PROGRESS NOTE ADULT - SUBJECTIVE AND OBJECTIVE BOX
Patient is a 68y old  Male who presents with a chief complaint of confusion (08 May 2019 12:11)      HPI:  ** Patient poor historian **    Patient is a 68 year old Non-Hodgkin's Lymphoma tx with chemotherapy in , DM2 with CKD stage 3, HTN, CHF EF 35% on cardiac cath 2016, pleural effusion s/p left pleuracentesis in 10/2016, cardiomyopathy, gout and HLD presents to the ED sent in from cardiologists office because of EKG changes. Patient is not sure why he is here. He was not sure where he was, why he is here or what even the year is. He remarks that he retired a few months ago and he stopped taking his medications then because he "wasn't feeling good". He states he was wife his wife earlier but then later he states shes in [place]. Patient currently denies chest pain, shortness of breath, palpitations, syncope, fevers, chills, recent travel or sick contacts. No new meds however he has stopped taking most of his meds. He overall, feels fine and is not sure why he is in the hospital.     It's very difficult to get a clear history from patient. I have tried to reach his daughter but have not received a call back as of yet. I have called [facility] patients pharmacy and he states patient picked up furosemide and irbersartan in April but has not picked up any other meds in months. Mentation in  AAOX3.    In the ED, VSS. Labs at baseline, CT head with old stroke, Trops elevated and peaked at 50, now normal, EKG with TWI in lateral leads. (08 May 2019 10:33)    5 as the above record indicates, the pt is here and is a very poor historian. I was asked by Dr Hawkins from oncology and by the pt's internist to see the pt as he did have a past of lymphoma. The oncology office will have to look up records as his history is not readily available. When I came to see the pt he was not able to supply much info about his cancer other than he had lymphoma and did not know the type or who treated him. Dr Hawkins thinks he may have been treated by his previous associate, Dr Sarmiento.     At the time of my visit the pt was alert and oriented but was not able to give specific details about any of his medical issues. His chemistries appeared all unremarkable and he was not febrile and his ct of the head showed nothing of note.  I will see if there are any records on his previous onc history and see if there is any reason to think it may have contributed to his present admit here.  events of last day was noted and pt was intubated and had heart failure. The records were reviewed at [facility] and pt in  had a large cell lymphoma and was treated with R CHOP. In  he went [place] [facility] [place] was treated for a recurrence with BR and he has remained in remission. 5/10 still intubated and sedated and labs reviewed and thus far no direct evidence for lymphoma recurrence.  Pt still intubated and sedated. Anemia from icu anemia causes no evidence for recurrent lymphoma.  the pt was extubated on this date and hemoglobin was noted to be 11.0 will look when stable for any evidenced of recurrent lymphoma.  notes reviewed and pt still looked somewhat disoriented will check him for nodes in the am counts ok.  stable but confused ands seen by neuro who feels confusion is from the cardiac problems. Pt to be evaluated for MV repair. 5/15 very poor memory and cognitive decline in this pt as he could not remember 4 objects one minute later. He has good right sided mental abilities and good spacial ability. We do not know how long or fast this cognitive decline has been in play. Will have to try to reach out to family members, he cannot recall being treated for his lymphoma. Ct head without contrast. Renal function is poor, so that we are limited with ct of the brain and body without contrast. ? multiinfarct dementia neuro cognitive studies check B12 and tsh levels.  pt is stable and neuro evaluation proceeding with scans of the head.  pt had ct of the abdomen and pelvis and chest and the nodes in the chest appear inconsequential. The spleen is 15.9 cm minimally enlarged and liver is read and slightly enlarged with normal liver function studies. Considering the history I do not think these findings warrant a search for a lymphoma.  pt was stable and workup continues and decisions on heart pending.  stable and notes reviewed in detail labs noted and decisions on heart procedures are pending.  pt is stable and cardiac workup decisions to be finalized.  pt is stable and no evidence of lymphoma on exam. He will have neuro cognitive studies as a outpt.  pt will be going home on this date and will follow with the cardiologists and no surgery is to be done at least for now. He will follow up with Dr Hawkins's team as a outpt.  MEDICATIONS  (STANDING):  allopurinol 300 milliGRAM(s) Oral daily  aspirin  chewable 81 milliGRAM(s) Oral daily  carvedilol 12.5 milliGRAM(s) Oral every 12 hours  dextrose 5%. 1000 milliLiter(s) (50 mL/Hr) IV Continuous <Continuous>  dextrose 50% Injectable 12.5 Gram(s) IV Push once  diVALproex  milliGRAM(s) Oral two times a day  docusate sodium 100 milliGRAM(s) Oral three times a day  folic acid 1 milliGRAM(s) Oral daily  furosemide   Injectable 80 milliGRAM(s) IV Push daily  heparin  Injectable 5000 Unit(s) SubCutaneous every 8 hours  hydrALAZINE 100 milliGRAM(s) Oral every 8 hours  insulin lispro (HumaLOG) corrective regimen sliding scale   SubCutaneous three times a day before meals  isosorbide   dinitrate Tablet (ISORDIL) 40 milliGRAM(s) Oral every 8 hours  melatonin 3 milliGRAM(s) Oral at bedtime  senna 2 Tablet(s) Oral at bedtime  spironolactone 25 milliGRAM(s) Oral daily        142  |  99  |  28<H>  ----------------------------<  90  3.5   |  30  |  1.83<H>    Ca    9.3      17 May 2019 07:06      Urinalysis Basic - ( 08 May 2019 00:17 )    Color: Yellow / Appearance: Slightly Turbid / S.023 / pH: x  Gluc: x / Ketone: Negative  / Bili: Small / Urobili: 3 mg/dL   Blood: x / Protein: 300 mg/dL / Nitrite: Negative   Leuk Esterase: Negative / RBC: 2 /hpf / WBC 7 /HPF   Sq Epi: x / Non Sq Epi: 1 /hpf / Bacteria: Negative        ROS:  Negative except for:    PAST MEDICAL & SURGICAL HISTORY:  Pleural effusion  Moderate mitral regurgitation  Systolic heart failure  Rhinitis, allergic  Essential hypertension  DM type 2 (diabetes mellitus, type 2)  Non-Hodgkins Lymphoma  Non-Hodgkin lymphoma: h/o axillary dissection      SOCIAL HISTORY:    FAMILY HISTORY:  Family history of non-Hodgkin's lymphoma (Sibling)  Family history of CHF (congestive heart failure)      Allergies    No Known Allergies    Intolerances        PHYSICAL EXAM  General: lying in the bed and not in distress  HEENT: stable   Neck: supple  CV: rr   Lungs: decreased breath sounds at the bases  Abdomen: soft non-tender non-distended, no hepatosplenomegaly  Ext: no clubbing cyanosis or edema  Skin: no rashes and no petechiae  Neuro: alert and oriented X3 no focal deficits    BLOOD SMEAR INTERPRETATION:    RADIOLOGY :

## 2019-05-24 NOTE — PROGRESS NOTE ADULT - SUBJECTIVE AND OBJECTIVE BOX
Patient is a 69y old  Male who presents with a chief complaint of confusion (24 May 2019 12:10)      INTERVAL HPI/OVERNIGHT EVENTS: noted, feels well, no new events      Vital Signs Last 24 Hrs  T(C): 36.4 (24 May 2019 12:06), Max: 36.6 (24 May 2019 04:49)  T(F): 97.6 (24 May 2019 12:06), Max: 97.9 (24 May 2019 04:49)  HR: 61 (24 May 2019 12:06) (59 - 62)  BP: 126/81 (24 May 2019 12:06) (123/57 - 130/64)  BP(mean): --  RR: 18 (24 May 2019 12:06) (18 - 18)  SpO2: 96% (24 May 2019 12:06) (93% - 96%)    ALBUTerol/ipratropium for Nebulization 3 milliLiter(s) Nebulizer every 6 hours PRN  allopurinol 300 milliGRAM(s) Oral daily  aspirin  chewable 81 milliGRAM(s) Oral daily  carvedilol 12.5 milliGRAM(s) Oral every 12 hours  dextrose 40% Gel 15 Gram(s) Oral once PRN  dextrose 5%. 1000 milliLiter(s) IV Continuous <Continuous>  dextrose 50% Injectable 12.5 Gram(s) IV Push once  diVALproex  milliGRAM(s) Oral two times a day  docusate sodium 100 milliGRAM(s) Oral three times a day  folic acid 1 milliGRAM(s) Oral daily  furosemide    Tablet 80 milliGRAM(s) Oral daily  glucagon  Injectable 1 milliGRAM(s) IntraMuscular once PRN  heparin  Injectable 5000 Unit(s) SubCutaneous every 8 hours  hydrALAZINE 100 milliGRAM(s) Oral every 8 hours  insulin lispro (HumaLOG) corrective regimen sliding scale   SubCutaneous three times a day before meals  isosorbide   dinitrate Tablet (ISORDIL) 40 milliGRAM(s) Oral every 8 hours  melatonin 3 milliGRAM(s) Oral at bedtime  Nephro-kwaku 1 Tablet(s) Oral daily  senna 2 Tablet(s) Oral at bedtime  sodium chloride 0.9% lock flush 10 milliLiter(s) IV Push every 1 hour PRN  spironolactone 25 milliGRAM(s) Oral daily  valproate sodium IVPB 125 milliGRAM(s) IV Intermittent every 8 hours PRN      PHYSICAL EXAM:  GENERAL: NAD,   EYES: conjunctiva and sclera clear  ENMT: Moist mucous membranes  NECK: Supple, No JVD, Normal thyroid  NERVOUS SYSTEM:  Alert & Oriented X1-2,   CHEST/LUNG: Clear to auscultation bilaterally; No rales, rhonchi, wheezing, or rubs  HEART: Regular rate and rhythm; No murmurs, rubs, or gallops  ABDOMEN: Soft, Nontender, Nondistended; Bowel sounds present  EXTREMITIES:  2+ Peripheral Pulses, No clubbing, cyanosis, or edema  LYMPH: No lymphadenopathy noted  SKIN: No rashes or lesions    Consultant(s) Notes Reviewed:  [x ] YES  [ ] NO  Care Discussed with Consultants/Other Providers [ x] YES  [ ] NO    LABS:    05-24    139  |  95<L>  |  55<H>  ----------------------------<  105<H>  3.6   |  31  |  1.79<H>    Ca    9.3      24 May 2019 06:16          CAPILLARY BLOOD GLUCOSE      POCT Blood Glucose.: 126 mg/dL (24 May 2019 12:48)  POCT Blood Glucose.: 121 mg/dL (24 May 2019 08:56)  POCT Blood Glucose.: 128 mg/dL (23 May 2019 17:59)            RADIOLOGY & ADDITIONAL TESTS:    Imaging Personally Reviewed:  [x ] YES  [ ] NO

## 2019-05-24 NOTE — PROGRESS NOTE ADULT - REASON FOR ADMISSION
Confusion
AMS, CHB
confusion

## 2019-06-03 ENCOUNTER — APPOINTMENT (OUTPATIENT)
Dept: ELECTROPHYSIOLOGY | Facility: CLINIC | Age: 69
End: 2019-06-03

## 2019-09-16 ENCOUNTER — APPOINTMENT (OUTPATIENT)
Dept: INTERNAL MEDICINE | Facility: CLINIC | Age: 69
End: 2019-09-16

## 2019-09-18 ENCOUNTER — EMERGENCY (EMERGENCY)
Facility: HOSPITAL | Age: 69
LOS: 1 days | Discharge: HOME | End: 2019-09-18
Attending: STUDENT IN AN ORGANIZED HEALTH CARE EDUCATION/TRAINING PROGRAM
Payer: MEDICARE

## 2019-09-18 ENCOUNTER — APPOINTMENT (OUTPATIENT)
Dept: ENDOCRINOLOGY | Facility: CLINIC | Age: 69
End: 2019-09-18

## 2019-09-18 VITALS
TEMPERATURE: 97 F | SYSTOLIC BLOOD PRESSURE: 170 MMHG | OXYGEN SATURATION: 95 % | RESPIRATION RATE: 18 BRPM | DIASTOLIC BLOOD PRESSURE: 88 MMHG | WEIGHT: 190.04 LBS | HEART RATE: 61 BPM

## 2019-09-18 DIAGNOSIS — C85.90 NON-HODGKIN LYMPHOMA, UNSPECIFIED, UNSPECIFIED SITE: Chronic | ICD-10-CM

## 2019-09-18 LAB
ALBUMIN SERPL ELPH-MCNC: 3.8 G/DL — SIGNIFICANT CHANGE UP (ref 3.3–5)
ALP SERPL-CCNC: 165 U/L — HIGH (ref 40–120)
ALT FLD-CCNC: 9 U/L — LOW (ref 10–45)
ANION GAP SERPL CALC-SCNC: 15 MMOL/L — SIGNIFICANT CHANGE UP (ref 5–17)
APTT BLD: 33.9 SEC — SIGNIFICANT CHANGE UP (ref 27.5–36.3)
AST SERPL-CCNC: 20 U/L — SIGNIFICANT CHANGE UP (ref 10–40)
BASOPHILS # BLD AUTO: 0 K/UL — SIGNIFICANT CHANGE UP (ref 0–0.2)
BASOPHILS NFR BLD AUTO: 0.5 % — SIGNIFICANT CHANGE UP (ref 0–2)
BILIRUB SERPL-MCNC: 1 MG/DL — SIGNIFICANT CHANGE UP (ref 0.2–1.2)
BUN SERPL-MCNC: 16 MG/DL — SIGNIFICANT CHANGE UP (ref 7–23)
CALCIUM SERPL-MCNC: 9.5 MG/DL — SIGNIFICANT CHANGE UP (ref 8.4–10.5)
CHLORIDE SERPL-SCNC: 108 MMOL/L — SIGNIFICANT CHANGE UP (ref 96–108)
CO2 SERPL-SCNC: 20 MMOL/L — LOW (ref 22–31)
CREAT SERPL-MCNC: 1.37 MG/DL — HIGH (ref 0.5–1.3)
EOSINOPHIL # BLD AUTO: 0.2 K/UL — SIGNIFICANT CHANGE UP (ref 0–0.5)
EOSINOPHIL NFR BLD AUTO: 2.8 % — SIGNIFICANT CHANGE UP (ref 0–6)
GLUCOSE SERPL-MCNC: 100 MG/DL — HIGH (ref 70–99)
HCT VFR BLD CALC: 43.8 % — SIGNIFICANT CHANGE UP (ref 39–50)
HGB BLD-MCNC: 13.4 G/DL — SIGNIFICANT CHANGE UP (ref 13–17)
INR BLD: 1.44 RATIO — HIGH (ref 0.88–1.16)
LYMPHOCYTES # BLD AUTO: 1 K/UL — SIGNIFICANT CHANGE UP (ref 1–3.3)
LYMPHOCYTES # BLD AUTO: 12 % — LOW (ref 13–44)
MCHC RBC-ENTMCNC: 27.5 PG — SIGNIFICANT CHANGE UP (ref 27–34)
MCHC RBC-ENTMCNC: 30.5 GM/DL — LOW (ref 32–36)
MCV RBC AUTO: 90 FL — SIGNIFICANT CHANGE UP (ref 80–100)
MONOCYTES # BLD AUTO: 0.7 K/UL — SIGNIFICANT CHANGE UP (ref 0–0.9)
MONOCYTES NFR BLD AUTO: 8 % — SIGNIFICANT CHANGE UP (ref 2–14)
NEUTROPHILS # BLD AUTO: 6.7 K/UL — SIGNIFICANT CHANGE UP (ref 1.8–7.4)
NEUTROPHILS NFR BLD AUTO: 76.7 % — SIGNIFICANT CHANGE UP (ref 43–77)
NT-PROBNP SERPL-SCNC: HIGH PG/ML (ref 0–300)
PLATELET # BLD AUTO: 212 K/UL — SIGNIFICANT CHANGE UP (ref 150–400)
POTASSIUM SERPL-MCNC: 5.1 MMOL/L — SIGNIFICANT CHANGE UP (ref 3.5–5.3)
POTASSIUM SERPL-SCNC: 5.1 MMOL/L — SIGNIFICANT CHANGE UP (ref 3.5–5.3)
PROT SERPL-MCNC: 8 G/DL — SIGNIFICANT CHANGE UP (ref 6–8.3)
PROTHROM AB SERPL-ACNC: 16.7 SEC — HIGH (ref 10–12.9)
RBC # BLD: 4.87 M/UL — SIGNIFICANT CHANGE UP (ref 4.2–5.8)
RBC # FLD: 15.9 % — HIGH (ref 10.3–14.5)
SODIUM SERPL-SCNC: 143 MMOL/L — SIGNIFICANT CHANGE UP (ref 135–145)
TROPONIN T, HIGH SENSITIVITY RESULT: 40 NG/L — SIGNIFICANT CHANGE UP (ref 0–51)
WBC # BLD: 8.7 K/UL — SIGNIFICANT CHANGE UP (ref 3.8–10.5)
WBC # FLD AUTO: 8.7 K/UL — SIGNIFICANT CHANGE UP (ref 3.8–10.5)

## 2019-09-18 PROCEDURE — 96374 THER/PROPH/DIAG INJ IV PUSH: CPT

## 2019-09-18 PROCEDURE — 84484 ASSAY OF TROPONIN QUANT: CPT

## 2019-09-18 PROCEDURE — 80053 COMPREHEN METABOLIC PANEL: CPT

## 2019-09-18 PROCEDURE — 71046 X-RAY EXAM CHEST 2 VIEWS: CPT | Mod: 26

## 2019-09-18 PROCEDURE — 83880 ASSAY OF NATRIURETIC PEPTIDE: CPT

## 2019-09-18 PROCEDURE — 99284 EMERGENCY DEPT VISIT MOD MDM: CPT | Mod: 25

## 2019-09-18 PROCEDURE — 85610 PROTHROMBIN TIME: CPT

## 2019-09-18 PROCEDURE — 93010 ELECTROCARDIOGRAM REPORT: CPT

## 2019-09-18 PROCEDURE — 71046 X-RAY EXAM CHEST 2 VIEWS: CPT

## 2019-09-18 PROCEDURE — 99285 EMERGENCY DEPT VISIT HI MDM: CPT

## 2019-09-18 PROCEDURE — 93005 ELECTROCARDIOGRAM TRACING: CPT

## 2019-09-18 PROCEDURE — 85730 THROMBOPLASTIN TIME PARTIAL: CPT

## 2019-09-18 PROCEDURE — 85027 COMPLETE CBC AUTOMATED: CPT

## 2019-09-18 RX ORDER — FUROSEMIDE 40 MG
40 TABLET ORAL ONCE
Refills: 0 | Status: COMPLETED | OUTPATIENT
Start: 2019-09-18 | End: 2019-09-18

## 2019-09-18 RX ADMIN — Medication 40 MILLIGRAM(S): at 21:30

## 2019-09-18 NOTE — ED PROVIDER NOTE - PLAN OF CARE
Patient with Patient with CHF exacerbation saturating well on RA, no desaturation and no respiratory distress.

## 2019-09-18 NOTE — ED PROVIDER NOTE - CARE PLAN
Principal Discharge DX:	Acute on chronic systolic congestive heart failure  Assessment and plan of treatment:	Patient with Principal Discharge DX:	Acute on chronic systolic congestive heart failure  Assessment and plan of treatment:	Patient with CHF exacerbation saturating well on RA, no desaturation and no respiratory distress.

## 2019-09-18 NOTE — ED PROVIDER NOTE - PROGRESS NOTE DETAILS
EKG with QRS similar to prior with PVCs. CXR showing RML opacity unchanged from prior, on prior CT scans noted to be chronic loculated effusion I spoke with patient about wanting to admit him and he does not wish to be admitted.  He says he will follow with the new doctor he saw tomorrow.  Pt understands that we are still awaiting blood work and cxr but we do not like the fact that he is currently under no medical care and is not taking any of his prior medications.  Pt encouraged to consider admission, but seems unwilling.  Will revisit when results are finalized.  - Gilma Lauren, DO Patient with good UOP. Ambulatory saturation 92% no respiratory distress. CXR with unchanged RML loculation. Extensively discussed with patient regarding necessity of follow up and to make PMD and cardiology follow up which will be expedited through ER REferral system

## 2019-09-18 NOTE — ED STATDOCS - NS_ ATTENDINGSCRIBEDETAILS _ED_A_ED_FT
The scribe's documentation has been prepared under my direction and personally reviewed by me in its entirety. I confirm that the note above accurately reflects all work, treatment, procedures, and medical decision making performed by me.  RAND Ambrosio MD

## 2019-09-18 NOTE — ED PROVIDER NOTE - ATTENDING CONTRIBUTION TO CARE
I performed a history and physical exam of the patient and discussed their management with the resident.  I reviewed the resident's note and agree with the documented findings and plan of care except as noted below. My medical decision making and observations are as follows:    69 M with PMH of HTN, T2DM, HFrEF with EF ~40% s/p PPM, severe pHTN non-Hodgkin lymphoma s/p chemo in 2004, CKD who p/w dyspnea x 1 day. Patient endorses he has had bilateral LE swelling over the past 3 days. He endorses dyspnea on exertion but denies fevers, chest pain, or cough. No sick contacts. Of note he has had admission in May for CHF exacerbation, but currently not on diuretics at home.  Pt had stopped seeing his doctors and is not currently on any medications.  He saw a new PMD earlier today that he says he will now follow with.  Pt in NAD, A&O x 3, hearrt rrr, lungs diminished at bilateral bases, abd soft ntnd, bilateral lower ext edema.  Will check labs, trop, pro bnp, cxr and give lasix for clinical overload and likely admission.

## 2019-09-18 NOTE — ED PROVIDER NOTE - NSFOLLOWUPINSTRUCTIONS_ED_ALL_ED_FT
You came in with shortness of breath which is likely due to fluid in your lungs. This occurred due to poor pumping function of your heart. We gave you fuoresemide in IV form which help remove excess fluid. Please make cardiology follow up to monitor your need for furosemide and optimize your heart function. Please return to the emergency room if you have worsening shortness of breath. follow a low salt diet.

## 2019-09-18 NOTE — ED PROVIDER NOTE - OBJECTIVE STATEMENT
69 M with PMH of HTN, T2DM, HFrEF with EF ~40% s/p PPM, severe pHTN non-Hodgkin lymphoma s/p chemo in 2004, CKD who p/w dyspnea x 1 day. Patient endorses he has had bilateral LE swelling over the past 3 days. He endorses dyspnea on exertion but denies fevers, chest pain, or cough. No sick contacts. Of note he has had admission in May for CHF exacerbation, but currently not on diuretics at home     PMD Proheatlh, but patient discontinue following.

## 2019-09-18 NOTE — ED ADULT NURSE NOTE - OBJECTIVE STATEMENT
69 YOM A&Ox3 presents to ED for leg swelling and light sob of several days. pt states was on medication for fluid retention & HTN a few 69 YOM A&Ox3 presents to ED for leg swelling and light sob of several days. pt states was on medication for fluid retention & HTN for a few months but stopped after finished prescription and did not renew prescription because he wanted to switch PCP's. pt states has slight sob. pt denies chest pain, n/v/d, headaches, blurry vision. safety maintained.

## 2019-09-18 NOTE — ED ADULT NURSE NOTE - CHPI ED NUR SYMPTOMS NEG
no dizziness/no vomiting/no decreased eating/drinking/no nausea/no weakness/no chills/no fever/no pain/no tingling

## 2019-09-18 NOTE — ED PROVIDER NOTE - NSFOLLOWUPCLINICS_GEN_ALL_ED_FT
[facility]  Cardiology  [address] [place] [address]  Phone: [phone]  Fax:   Follow Up Time: 1-3 Days

## 2019-09-18 NOTE — ED PROVIDER NOTE - CLINICAL SUMMARY MEDICAL DECISION MAKING FREE TEXT BOX
69 M with PMH of HTN, T2DM, HFrEF with EF ~40% s/p PPM, severe pHTN non-Hodgkin lymphoma s/p chemo in 2004, CKD who p/w dyspnea x 1 day found to have CHF exacerbation s/p lasix 40 with improvement. Patient endorsing he will get outpatient follow up and wishing to go home. 69 M with PMH of HTN, T2DM, HFrEF with EF ~40% s/p PPM, severe pHTN non-Hodgkin lymphoma s/p chemo in 2004, CKD who p/w dyspnea x 1 day found to have CHF exacerbation s/p lasix 40 with improvement. Patient endorsing he will get outpatient follow up and wishing to go home. Given CXR unchanged from prior and negative troponins, will discharge with emphasis on close follow up 69 M with PMH of HTN, T2DM, HFrEF with EF ~40% s/p PPM, severe pHTN non-Hodgkin lymphoma s/p chemo in 2004, CKD who p/w dyspnea x 1 day found to have CHF exacerbation s/p lasix 40 with improvement. Patient encouraged to allow admission to hospital but declined.  Pt endorsing he will get outpatient follow up and wishing to go home. Given CXR unchanged from prior and negative troponins, will discharge with emphasis on close follow up

## 2019-09-18 NOTE — ED STATDOCS - OBJECTIVE STATEMENT
70 y/o M with pmHx of pleural effusion, moderate mitral regurgitation, systolic heart failure, rhinitis, essential HTN, DM type 2, non-Hodgkin's lymphoma, and kidney disease and psHx of H/O axillary dissection presents to the ED c/o of SOB x today. Worsens while lying down, needs to be propped up by pillows. + fluid retention in B/L LE. Similar Sx occurred in May when he was admitted at [facility] for 2.5 weeks for CHF. Pacemaker was placed on 05/10.

## 2019-09-18 NOTE — ED PROVIDER NOTE - PATIENT PORTAL LINK FT
You can access the FollowMyHealth Patient Portal offered by [facility] by registering at the following website: [email or web address]. By joining [email or web address]’s FollowMyHealth portal, you will also be able to view your health information using other applications (apps) compatible with our system.

## 2019-09-18 NOTE — ED STATDOCS - CLINICAL SUMMARY MEDICAL DECISION MAKING FREE TEXT BOX
Attending note (Hebert): This document reflects a rapid assessment performed in the waiting room during periods of high patient volume, and is a preliminary patient assessment, and does not take the place of or supersede any further medical assessment or treatment.     Pt p/w SOB and leg edmea, concern for worsening renal function or chf exacerbation / pulmonary edema; not significant rales on exam but limited exam in waiting room; not in respiratory distress; labs (cbc,cmp,trop,probnp) and cxr ordered; to go to main ED for further evaluation.

## 2019-09-18 NOTE — ED PROVIDER NOTE - PHYSICAL EXAMINATION
PHYSICAL EXAM:    General: No acute distress.  HEENT: NCAT.  PERRL.  EOMI.  No scleral icterus or injection.  es.    Neck: Supple.  Full ROM.  Mild JVD.    Heart: RRR.  Normal S1 and S2.  No murmurs  Lungs: Crackles bibasilar  Abdomen: BS+, soft, NT/ND.  No organomegaly.  Skin: Warm and dry.  No rashes.  Extremities: 2+ pitting edema bilaterally.2+ peripheral pulses b/l.  Musculoskeletal: No spinal or paraspinal tenderness.  Neuro: A&Ox3. no focal deficits.

## 2019-09-18 NOTE — ED STATDOCS - PHYSICAL EXAMINATION
*** LIMITED PHYSICAL EXAM IN WAITING ROOM ***    lungs ctabl  2+ pretibial pitting edema  cardiac s1 s2  holosystolic murmur IV/VI

## 2019-09-19 VITALS
SYSTOLIC BLOOD PRESSURE: 171 MMHG | RESPIRATION RATE: 18 BRPM | TEMPERATURE: 98 F | DIASTOLIC BLOOD PRESSURE: 94 MMHG | OXYGEN SATURATION: 91 % | HEART RATE: 79 BPM

## 2019-09-19 LAB — TROPONIN T, HIGH SENSITIVITY RESULT: 30 NG/L — SIGNIFICANT CHANGE UP (ref 0–51)

## 2019-09-25 ENCOUNTER — APPOINTMENT (OUTPATIENT)
Dept: INTERNAL MEDICINE | Facility: CLINIC | Age: 69
End: 2019-09-25
Payer: MEDICARE

## 2019-09-25 ENCOUNTER — INPATIENT (INPATIENT)
Facility: HOSPITAL | Age: 69
LOS: 6 days | Discharge: HOME | DRG: 242 | End: 2019-10-02
Attending: HOSPITALIST | Admitting: HOSPITALIST
Payer: MEDICARE

## 2019-09-25 VITALS
WEIGHT: 214.95 LBS | HEART RATE: 60 BPM | OXYGEN SATURATION: 95 % | TEMPERATURE: 98 F | HEIGHT: 72.25 IN | RESPIRATION RATE: 18 BRPM | SYSTOLIC BLOOD PRESSURE: 159 MMHG | DIASTOLIC BLOOD PRESSURE: 83 MMHG

## 2019-09-25 VITALS
HEIGHT: 72.25 IN | WEIGHT: 215 LBS | TEMPERATURE: 97.9 F | DIASTOLIC BLOOD PRESSURE: 74 MMHG | SYSTOLIC BLOOD PRESSURE: 142 MMHG | BODY MASS INDEX: 28.8 KG/M2

## 2019-09-25 DIAGNOSIS — C85.90 NON-HODGKIN LYMPHOMA, UNSPECIFIED, UNSPECIFIED SITE: Chronic | ICD-10-CM

## 2019-09-25 DIAGNOSIS — I50.23 ACUTE ON CHRONIC SYSTOLIC (CONGESTIVE) HEART FAILURE: ICD-10-CM

## 2019-09-25 DIAGNOSIS — I50.9 HEART FAILURE, UNSPECIFIED: ICD-10-CM

## 2019-09-25 DIAGNOSIS — E11.9 TYPE 2 DIABETES MELLITUS WITHOUT COMPLICATIONS: ICD-10-CM

## 2019-09-25 DIAGNOSIS — N18.3 CHRONIC KIDNEY DISEASE, STAGE 3 (MODERATE): ICD-10-CM

## 2019-09-25 DIAGNOSIS — I34.0 NONRHEUMATIC MITRAL (VALVE) INSUFFICIENCY: ICD-10-CM

## 2019-09-25 DIAGNOSIS — Z29.9 ENCOUNTER FOR PROPHYLACTIC MEASURES, UNSPECIFIED: ICD-10-CM

## 2019-09-25 DIAGNOSIS — C85.90 NON-HODGKIN LYMPHOMA, UNSPECIFIED, UNSPECIFIED SITE: ICD-10-CM

## 2019-09-25 DIAGNOSIS — M1A.9XX0 CHRONIC GOUT, UNSPECIFIED, WITHOUT TOPHUS (TOPHI): ICD-10-CM

## 2019-09-25 DIAGNOSIS — I50.814 RIGHT HEART FAILURE DUE TO LEFT HEART FAILURE: ICD-10-CM

## 2019-09-25 DIAGNOSIS — I10 ESSENTIAL (PRIMARY) HYPERTENSION: ICD-10-CM

## 2019-09-25 DIAGNOSIS — Z95.0 PRESENCE OF CARDIAC PACEMAKER: Chronic | ICD-10-CM

## 2019-09-25 DIAGNOSIS — R74.8 ABNORMAL LEVELS OF OTHER SERUM ENZYMES: ICD-10-CM

## 2019-09-25 LAB
ALBUMIN SERPL ELPH-MCNC: 3.7 G/DL — SIGNIFICANT CHANGE UP (ref 3.3–5)
ALP SERPL-CCNC: 170 U/L — HIGH (ref 40–120)
ALT FLD-CCNC: 9 U/L — LOW (ref 10–45)
ANION GAP SERPL CALC-SCNC: 13 MMOL/L — SIGNIFICANT CHANGE UP (ref 5–17)
APTT BLD: 31.8 SEC — SIGNIFICANT CHANGE UP (ref 27.5–36.3)
AST SERPL-CCNC: 17 U/L — SIGNIFICANT CHANGE UP (ref 10–40)
BASOPHILS # BLD AUTO: 0 K/UL — SIGNIFICANT CHANGE UP (ref 0–0.2)
BASOPHILS NFR BLD AUTO: 0.6 % — SIGNIFICANT CHANGE UP (ref 0–2)
BILIRUB SERPL-MCNC: 1 MG/DL — SIGNIFICANT CHANGE UP (ref 0.2–1.2)
BUN SERPL-MCNC: 18 MG/DL — SIGNIFICANT CHANGE UP (ref 7–23)
CALCIUM SERPL-MCNC: 8.9 MG/DL — SIGNIFICANT CHANGE UP (ref 8.4–10.5)
CHLORIDE SERPL-SCNC: 106 MMOL/L — SIGNIFICANT CHANGE UP (ref 96–108)
CO2 SERPL-SCNC: 25 MMOL/L — SIGNIFICANT CHANGE UP (ref 22–31)
CREAT SERPL-MCNC: 1.35 MG/DL — HIGH (ref 0.5–1.3)
EOSINOPHIL # BLD AUTO: 0.2 K/UL — SIGNIFICANT CHANGE UP (ref 0–0.5)
EOSINOPHIL NFR BLD AUTO: 2.6 % — SIGNIFICANT CHANGE UP (ref 0–6)
GLUCOSE BLDC GLUCOMTR-MCNC: 120 MG/DL — HIGH (ref 70–99)
GLUCOSE BLDC GLUCOMTR-MCNC: 131 MG/DL — HIGH (ref 70–99)
GLUCOSE SERPL-MCNC: 117 MG/DL — HIGH (ref 70–99)
HCT VFR BLD CALC: 41.3 % — SIGNIFICANT CHANGE UP (ref 39–50)
HGB BLD-MCNC: 12.7 G/DL — LOW (ref 13–17)
INR BLD: 1.47 RATIO — HIGH (ref 0.88–1.16)
LYMPHOCYTES # BLD AUTO: 0.7 K/UL — LOW (ref 1–3.3)
LYMPHOCYTES # BLD AUTO: 8.4 % — LOW (ref 13–44)
MCHC RBC-ENTMCNC: 27.6 PG — SIGNIFICANT CHANGE UP (ref 27–34)
MCHC RBC-ENTMCNC: 30.8 GM/DL — LOW (ref 32–36)
MCV RBC AUTO: 89.8 FL — SIGNIFICANT CHANGE UP (ref 80–100)
MONOCYTES # BLD AUTO: 0.5 K/UL — SIGNIFICANT CHANGE UP (ref 0–0.9)
MONOCYTES NFR BLD AUTO: 6.9 % — SIGNIFICANT CHANGE UP (ref 2–14)
NEUTROPHILS # BLD AUTO: 6.5 K/UL — SIGNIFICANT CHANGE UP (ref 1.8–7.4)
NEUTROPHILS NFR BLD AUTO: 81.5 % — HIGH (ref 43–77)
NT-PROBNP SERPL-SCNC: HIGH PG/ML (ref 0–300)
PLATELET # BLD AUTO: 188 K/UL — SIGNIFICANT CHANGE UP (ref 150–400)
POTASSIUM SERPL-MCNC: 4.6 MMOL/L — SIGNIFICANT CHANGE UP (ref 3.5–5.3)
POTASSIUM SERPL-SCNC: 4.6 MMOL/L — SIGNIFICANT CHANGE UP (ref 3.5–5.3)
PROT SERPL-MCNC: 7.9 G/DL — SIGNIFICANT CHANGE UP (ref 6–8.3)
PROTHROM AB SERPL-ACNC: 17 SEC — HIGH (ref 10–12.9)
RBC # BLD: 4.6 M/UL — SIGNIFICANT CHANGE UP (ref 4.2–5.8)
RBC # FLD: 15.7 % — HIGH (ref 10.3–14.5)
SODIUM SERPL-SCNC: 144 MMOL/L — SIGNIFICANT CHANGE UP (ref 135–145)
TROPONIN T, HIGH SENSITIVITY RESULT: 41 NG/L — SIGNIFICANT CHANGE UP (ref 0–51)
TROPONIN T, HIGH SENSITIVITY RESULT: 45 NG/L — SIGNIFICANT CHANGE UP (ref 0–51)
WBC # BLD: 8 K/UL — SIGNIFICANT CHANGE UP (ref 3.8–10.5)
WBC # FLD AUTO: 8 K/UL — SIGNIFICANT CHANGE UP (ref 3.8–10.5)

## 2019-09-25 PROCEDURE — G0438: CPT | Mod: 25

## 2019-09-25 PROCEDURE — 99285 EMERGENCY DEPT VISIT HI MDM: CPT

## 2019-09-25 PROCEDURE — 93000 ELECTROCARDIOGRAM COMPLETE: CPT

## 2019-09-25 PROCEDURE — 93010 ELECTROCARDIOGRAM REPORT: CPT

## 2019-09-25 PROCEDURE — 71046 X-RAY EXAM CHEST 2 VIEWS: CPT | Mod: 26

## 2019-09-25 PROCEDURE — 99223 1ST HOSP IP/OBS HIGH 75: CPT | Mod: GC

## 2019-09-25 PROCEDURE — 99203 OFFICE O/P NEW LOW 30 MIN: CPT | Mod: 25

## 2019-09-25 RX ORDER — HYDRALAZINE HCL 50 MG
50 TABLET ORAL THREE TIMES A DAY
Refills: 0 | Status: DISCONTINUED | OUTPATIENT
Start: 2019-09-25 | End: 2019-09-26

## 2019-09-25 RX ORDER — DEXTROSE 50 % IN WATER 50 %
15 SYRINGE (ML) INTRAVENOUS ONCE
Refills: 0 | Status: DISCONTINUED | OUTPATIENT
Start: 2019-09-25 | End: 2019-10-02

## 2019-09-25 RX ORDER — ISOSORBIDE DINITRATE 5 MG/1
20 TABLET ORAL THREE TIMES A DAY
Refills: 0 | Status: DISCONTINUED | OUTPATIENT
Start: 2019-09-25 | End: 2019-09-27

## 2019-09-25 RX ORDER — DEXTROSE 50 % IN WATER 50 %
25 SYRINGE (ML) INTRAVENOUS ONCE
Refills: 0 | Status: DISCONTINUED | OUTPATIENT
Start: 2019-09-25 | End: 2019-10-02

## 2019-09-25 RX ORDER — FUROSEMIDE 40 MG
80 TABLET ORAL
Refills: 0 | Status: DISCONTINUED | OUTPATIENT
Start: 2019-09-25 | End: 2019-09-25

## 2019-09-25 RX ORDER — INFLUENZA VIRUS VACCINE 15; 15; 15; 15 UG/.5ML; UG/.5ML; UG/.5ML; UG/.5ML
0.5 SUSPENSION INTRAMUSCULAR ONCE
Refills: 0 | Status: COMPLETED | OUTPATIENT
Start: 2019-09-25 | End: 2019-09-25

## 2019-09-25 RX ORDER — FUROSEMIDE 40 MG
40 TABLET ORAL ONCE
Refills: 0 | Status: COMPLETED | OUTPATIENT
Start: 2019-09-25 | End: 2019-09-25

## 2019-09-25 RX ORDER — FUROSEMIDE 40 MG
80 TABLET ORAL
Refills: 0 | Status: DISCONTINUED | OUTPATIENT
Start: 2019-09-25 | End: 2019-10-02

## 2019-09-25 RX ORDER — GLUCAGON INJECTION, SOLUTION 0.5 MG/.1ML
1 INJECTION, SOLUTION SUBCUTANEOUS ONCE
Refills: 0 | Status: DISCONTINUED | OUTPATIENT
Start: 2019-09-25 | End: 2019-10-02

## 2019-09-25 RX ORDER — DEXTROSE 50 % IN WATER 50 %
12.5 SYRINGE (ML) INTRAVENOUS ONCE
Refills: 0 | Status: DISCONTINUED | OUTPATIENT
Start: 2019-09-25 | End: 2019-10-02

## 2019-09-25 RX ORDER — INSULIN LISPRO 100/ML
VIAL (ML) SUBCUTANEOUS
Refills: 0 | Status: DISCONTINUED | OUTPATIENT
Start: 2019-09-25 | End: 2019-10-02

## 2019-09-25 RX ORDER — HEPARIN SODIUM 5000 [USP'U]/ML
5000 INJECTION INTRAVENOUS; SUBCUTANEOUS EVERY 8 HOURS
Refills: 0 | Status: DISCONTINUED | OUTPATIENT
Start: 2019-09-25 | End: 2019-09-26

## 2019-09-25 RX ORDER — SODIUM CHLORIDE 9 MG/ML
1000 INJECTION, SOLUTION INTRAVENOUS
Refills: 0 | Status: DISCONTINUED | OUTPATIENT
Start: 2019-09-25 | End: 2019-10-02

## 2019-09-25 RX ADMIN — HEPARIN SODIUM 5000 UNIT(S): 5000 INJECTION INTRAVENOUS; SUBCUTANEOUS at 21:20

## 2019-09-25 RX ADMIN — Medication 80 MILLIGRAM(S): at 18:30

## 2019-09-25 RX ADMIN — Medication 50 MILLIGRAM(S): at 21:20

## 2019-09-25 RX ADMIN — Medication 40 MILLIGRAM(S): at 13:18

## 2019-09-25 RX ADMIN — ISOSORBIDE DINITRATE 20 MILLIGRAM(S): 5 TABLET ORAL at 21:20

## 2019-09-25 NOTE — H&P ADULT - PROBLEM SELECTOR PLAN 2
severe MR on TE 5/2019, was supposed to be on Hydralazine 100 tid and Isordil 40 tid per cards note  - HF consulted, oneyda recs severe MR on TE 5/2019, was supposed to be on Hydralazine 100 tid and Isordil 40 tid per cards note  - HF consulted, oneyda recs  - saw Dr. Guevara at last admission, may benefit from mitralclip if is a candidate severe MR on TE 5/2019, was supposed to be on Hydralazine 100 tid and Isordil 40 tid per cards note  - HF consulted, oneyda recs  - saw Dr. Jason Keen at last admission, may benefit from mitralclip if is a candidate

## 2019-09-25 NOTE — H&P ADULT - PROBLEM SELECTOR PLAN 5
Mildly elevated without elevation in Tbili  - will check GTT  - if elevated, consider obtain RUQ u/s

## 2019-09-25 NOTE — H&P ADULT - NSHPLABSRESULTS_GEN_ALL_CORE
12.7   8.0   )-----------( 188      ( 25 Sep 2019 11:40 )             41.3       09-25    144  |  106  |  18  ----------------------------<  117<H>  4.6   |  25  |  1.35<H>    Ca    8.9      25 Sep 2019 11:40    TPro  7.9  /  Alb  3.7  /  TBili  1.0  /  DBili  x   /  AST  17  /  ALT  9<L>  /  AlkPhos  170<H>  09-25          PT/INR - ( 25 Sep 2019 11:40 )   PT: 17.0 sec;   INR: 1.47 ratio         PTT - ( 25 Sep 2019 11:40 )  PTT:31.8 sec    Lactate Trend      CAPILLARY BLOOD GLUCOSE      < from: Xray Chest 2 Views PA/Lat (09.25.19 @ 12:04) >    Impression: Unchanged loculated fluid in the right fissure. No focal   consolidation is seen.    < end of copied text >      EKG: wide complex with PVCs, rate 65.

## 2019-09-25 NOTE — H&P ADULT - PROBLEM SELECTOR PLAN 6
BP elevated in the ED 2/2 medication non-compliance  - c/w all home anti-HTN with hold parameters  - f/u HF recs BP elevated in the ED 2/2 medication non-compliance  - c/w meds as above  - f/u HF recs

## 2019-09-25 NOTE — H&P ADULT - NSICDXFAMILYHX_GEN_ALL_CORE_FT
FAMILY HISTORY:  Family history of CHF (congestive heart failure), Mother    Sibling  Still living? Unknown  Family history of non-Hodgkin's lymphoma, Age at diagnosis: Age Unknown

## 2019-09-25 NOTE — H&P ADULT - PROBLEM SELECTOR PLAN 1
Dyspnea and LE edema, acute on chronic CHF 2/2 medication non-compliance. BNP 39369. Most recent TTE EF 45%, likely severe MR, moderately enlarged RV , severe pulmonary hypertension. Outpt meds include Lasix 80mg QD, Aldactone 25mg QD, carvedilol 12.5mg BID, Hydralazine & Isordil - has not taken any since July.   - s/p PPM for complete heart block  - s/p 40mg IVP in the ED  - admit to tele  - c/w 40mg Lasix BID  - restart home meds: Aldactone 25mg QD, carvedilol 12.5mg BID, Hydralazine 100mg TID & Isordil 40mg TID  - daily weights  - strict in/out  - Obtain TTE  - trend trop to peak  - consider HF consult, oneyda recs Dyspnea and LE edema, acute on chronic CHF 2/2 medication non-compliance. BNP 99664. Most recent TTE EF 45%, likely severe MR, moderately enlarged RV , severe pulmonary hypertension. Outpt meds include Lasix 80mg QD, Aldactone 25mg QD, carvedilol 12.5mg BID, Hydralazine & Isordil - has not taken any since July.   - s/p PPM for complete heart block  - s/p 40mg IVP in the ED  - admit to tele  - c/w 40mg Lasix BID  - home meds: Aldactone 25mg QD, carvedilol 12.5mg BID, Hydralazine 100mg TID & Isordil 40mg TID -> will restart hydralazine 50mg TID and Isordil 20mg TID and uptitrate  - daily weights  - strict in/out  - Obtain TTE  - trend trop to peak  - consider HF consult, oneyda recs Dyspnea and LE edema, acute on chronic CHF 2/2 medication non-compliance. BNP 19901. Most recent TTE EF 45%, likely severe MR, moderately enlarged RV , severe pulmonary hypertension. Outpt meds include Lasix 80mg QD, Aldactone 25mg QD, carvedilol 12.5mg BID, Hydralazine & Isordil - has not taken any since July.   - s/p PPM for complete heart block  - s/p 40mg IVP in the ED  - admit to tele  - c/w 80mg Lasix BID  - home meds: Aldactone 25mg QD, carvedilol 12.5mg BID, Hydralazine 100mg TID & Isordil 40mg TID -> will restart hydralazine 50mg TID and Isordil 20mg TID and uptitrate  - daily weights  - strict in/out  - Obtain TTE  - trend trop to peak  - consider HF consult, oneyda recs

## 2019-09-25 NOTE — H&P ADULT - NSHPSOCIALHISTORY_GEN_ALL_CORE
Lives at home alone. Retired [employer] manager. Former 1ppd smoker x 40 years, quitted 40 years ago. No drug or ETOH use.

## 2019-09-25 NOTE — H&P ADULT - NSHPREVIEWOFSYSTEMS_GEN_ALL_CORE
REVIEW OF SYSTEMS:    CONSTITUTIONAL: No weakness, fevers or chills  EYES: No vertigo or throat pain  ENT: No visual changes, eye pain  MOUTH: moist harrison mucosal, no mouth ulcers  NECK: No pain or stiffness  RESPIRATORY: + dry cough and shortness of breath. No wheezing, hemoptysis  CARDIOVASCULAR: No chest pain or palpitations  GASTROINTESTINAL: No abdominal or epigastric pain. No nausea, vomiting, or hematemesis; No diarrhea or constipation. No melena or hematochezia.  GENITOURINARY: No dysuria, frequency or hematuria  NEUROLOGICAL: No numbness or weakness. + LE edema  SKIN: No itching, rashes

## 2019-09-25 NOTE — H&P ADULT - HISTORY OF PRESENT ILLNESS
69 M with PMH of HTN, T2DM, HFrEF with EF ~45%, complete heart block s/p PPM, non-Hodgkin lymphoma s/p chemo in 2004 (currently in remission), CKD stage II who p/w dyspnea x 3 days. Patient has been off all of his medications since July d/t lack of PCP. He initially developed dyspnea and ARGUETA a week ago. Presented to the ED on 9/18, given 80mg Lasix, then sent home. Reports that his symptoms improved mildly then returned. He has been noting dyspnea along withe worsening LE edema x 3 days. Endorses to unable to sleep at night, with associated dyspnea on exertion as well. Denies fever, chill, CP, n/v/c/d, urinary concerns. No recent travels, no O2 needs at home, and no sick contacts. History of heart failure, only admission for CHF exacerbation was in May of 2019, where he was also found to have complete heart block 2/2 pacemaker placement.   ED vitals: /93, saturating 95% on RA. Labs with elevated Cr- (at baseline), BNP 10388, trop of 45. CXR without pleural effusion or consolidations. s/p 40mg IVP lasix.  At the time of my examination, patient denies any acute concerns. Has not urinated since the Lasix yet. Has not taken any medications as he ran out on all of them since July and could not establish care with a new PCP. Used to take spironolactone, Laxis, hydralazine, Isordil, and carvedilol for heart failure. Weight after discharge in May 280s, currently about 310lbs.

## 2019-09-25 NOTE — ED ADULT NURSE NOTE - OBJECTIVE STATEMENT
69 year old male with Hx of systolic heart failure presents to the ED Complaining of leg swelling, SOB, and dyspnea on exertion for the last week. Pt is A&O x 4, VSS, afebrile, ambulating independently. Pt denies fever, chills, NVD, SOB, or chest pain. IV placed, labs drawn, bed in low position, safety measures in place. 20G IV placed in the left lateral forearm. Pt has 2+ pitting edema in bilateral lower extremities. Pt able to speak in full sentences. wife at bedside. CM placed on patient, EKG completed in triage and given to attending.

## 2019-09-25 NOTE — H&P ADULT - NSICDXPASTMEDICALHX_GEN_ALL_CORE_FT
PAST MEDICAL HISTORY:  DM type 2 (diabetes mellitus, type 2) Never on insulin    Essential hypertension     Moderate mitral regurgitation     Non-Hodgkins Lymphoma In [facility] for > 10 years    Pleural effusion     Rhinitis, allergic     Systolic heart failure

## 2019-09-25 NOTE — ED PROVIDER NOTE - ATTENDING CONTRIBUTION TO CARE
****ATTENDING**** 70yo male hx listed BIB daughter for SOB and B/L LE edema. Patient states he has exertional symptoms. No recent cough, uri. Complains of intermittent chest pressure that is relieved w rest. Pt is non compliant w his meds and has not seen a doctor since June.   On exam, Patient is awake,alert,oriented x 3. Patient is well appearing and in no acute distress. Patient's chest is bibasilar rales +s1s2. Abdomen is soft nd/nt +BS. Extremity with 2+ edema.  Labs and xray chest to review for ACS vs HF.

## 2019-09-25 NOTE — H&P ADULT - ATTENDING COMMENTS
Agree with above with donna addendum: This is a patient with known HFrEF of 45% with severe MR, severe pulmonary HTN recent admission in May for HFrEF, [facility], has not taken any medications since July presents with anasarca, pulmonary edema , acute biventricular heart failure    #Acute biventricular heart failure  -patient with grossly overloaded. Unoffiical POCUS lung with diffuse b-line and moderate left sided effusion.  Unoffiical POCUS cardiac with reduced EF, unable to obtain good apical 4 or subcostal view so limited evaluatoin of RV, no apparent RV septal "D" on parasternal short view.    -will start with lasix 80mg IV BID net negative goal 1.5L per day  -strict I/O, daily standing weights.  -start half dose of hydralazine and isordil at 50mg TID and 20mg TID respectively.  -hold metoprolol as in acute exacerbation and has been off for 2 months.  -cards/heart faliure eval especially given severe MR about re-evaluatoin for mitral clip candidate.   -if craetinine holds in next 24-48 hours, can restart spironolactone.   -unclear why on 2nd line therapy with hydral/isodril instead of lisinopril, will have to look into further    #CKD III  -patient with improved creaitnine, in setting of fluid overload. Expect creatinine to rise appropriately with diuresis, will likely be acceptable creaitnine of 1.5-1.7 range.    #DM type 2  -insulin sliding scale, check HbA1C in AM

## 2019-09-25 NOTE — ED PROVIDER NOTE - PROGRESS NOTE
Stable.

## 2019-09-25 NOTE — H&P ADULT - ASSESSMENT
69 M with PMH of HTN, T2DM, HFrEF with EF ~45%, chronic gout, complete heart block s/p PPM, non-Hodgkin lymphoma s/p chemo in 2004 (currently in remission), CKD stage II who p/w dyspnea x 3 days, admitted for acute on chronic decompensated heart failure.

## 2019-09-25 NOTE — ED PROVIDER NOTE - OBJECTIVE STATEMENT
70 yo male PMhx     PMD: Dr. Christiano Doty. 68 yo male PMhx HTN, HLD, CHF, DM on no current medicine at home 2/2 noncompliance presents to the ED c/o shortness of breath.     PMD: Dr. Christiano Doty. 70 yo male PMhx HTN, HLD, HFrEF s/p PPM, T2DM, CKD on no current medicine at home 2/2 noncompliance presents to the ED c/o shortness of breath.     PMD: Dr. Christiano Doty. 68 yo male PMhx HTN, HLD, HFrEF s/p PPM, T2DM, CKD on no current medicine at home 2/2 noncompliance presents to the ED c/o progressively worsening shortness of breath and b/l LE swelling x 1 week. Pt seen here on 9/18/19 for same sxs, offered admission but refused, wanted to f/u with PMD. Saw NP from PMDs office today and was sent to ED again for admission. Symptoms have not improved since previous ED visit, have progressively worsened. No sob at rest, only w/ exertion. Denies chest pain, abdominal pain/bloating, n/v/d, palpitations, weakness, fever/chills, dizziness, calf pain, hx DVT/PE.  PMD: Dr. Christiano L Doty

## 2019-09-25 NOTE — ED CLERICAL - NS ED CLERK NOTE PRE-ARRIVAL INFORMATION; ADDITIONAL PRE-ARRIVAL INFORMATION
CC/Reason For referral: Heart Failure, Diabetes.  Preferred Consultant(if applicable):  Who admits for you (if needed):  Do you have documents you would like to fax over?  Would you still like to speak to an ED attending? please call after patient is seen

## 2019-09-25 NOTE — H&P ADULT - NSHPPHYSICALEXAM_GEN_ALL_CORE
Vital Signs Last 24 Hrs  T(C): 36.4 (25 Sep 2019 10:24), Max: 36.4 (25 Sep 2019 10:24)  T(F): 97.6 (25 Sep 2019 10:24), Max: 97.6 (25 Sep 2019 10:24)  HR: 70 (25 Sep 2019 13:18) (60 - 70)  BP: 168/93 (25 Sep 2019 13:18) (159/83 - 168/93)  BP(mean): --  RR: 18 (25 Sep 2019 13:18) (18 - 18)  SpO2: 95% (25 Sep 2019 13:18) (95% - 95%)    PHYSICAL EXAM:  GENERAL: NAD, well-developed  HEAD:  Atraumatic, Normocephalic  EYES: EOMI, PERRLA, conjunctiva and sclera clear  NECK: Supple  CHEST/LUNG: Clear to auscultation bilaterally; No wheeze  HEART: Regular rate and rhythm; No murmurs, rubs, or gallops  ABDOMEN: Soft, Nontender, distended. Bowel sounds present  EXTREMITIES:  2+ edema up to the thigh, with dependent edema noted in the back as well  PSYCH: AAOx3  NEUROLOGY: non-focal  SKIN: No rashes or lesions Vital Signs Last 24 Hrs  T(C): 36.4 (25 Sep 2019 10:24), Max: 36.4 (25 Sep 2019 10:24)  T(F): 97.6 (25 Sep 2019 10:24), Max: 97.6 (25 Sep 2019 10:24)  HR: 70 (25 Sep 2019 13:18) (60 - 70)  BP: 168/93 (25 Sep 2019 13:18) (159/83 - 168/93)  BP(mean): --  RR: 18 (25 Sep 2019 13:18) (18 - 18)  SpO2: 95% (25 Sep 2019 13:18) (95% - 95%)    PHYSICAL EXAM:  GENERAL: NAD, well-developed  HEAD:  Atraumatic, Normocephalic  EYES: EOMI, PERRLA, conjunctiva and sclera clear  NECK: Supple, elevated JVD and + hepatojugular reflex  CHEST/LUNG: Clear to auscultation bilaterally; No wheeze  HEART: Regular rate and rhythm; No murmurs, rubs, or gallops  ABDOMEN: Soft, Nontender, distended. Bowel sounds present  EXTREMITIES:  2+ edema up to the thigh, with dependent edema noted in the back as well  PSYCH: AAOx3  NEUROLOGY: non-focal  SKIN: No rashes or lesions

## 2019-09-25 NOTE — H&P ADULT - PROBLEM SELECTOR PLAN 4
Cr- 1.35, better than baseline  - daily BMP  - renally dose all meds and avoid nephrotoxic medications

## 2019-09-25 NOTE — H&P ADULT - PROBLEM SELECTOR PROBLEM 1
Acute on chronic systolic (congestive) heart failure Biventricular heart failure with reduced left ventricular function

## 2019-09-25 NOTE — H&P ADULT - PROBLEM SELECTOR PLAN 8
- CT scan on 5/17 with nodes in the chest that appear inconsequential, heme/onc saw pt during May 2019 admission -> above findings does not warrant a search for a lymphoma

## 2019-09-26 LAB
ALBUMIN SERPL ELPH-MCNC: 3.5 G/DL — SIGNIFICANT CHANGE UP (ref 3.3–5)
ALP SERPL-CCNC: 147 U/L — HIGH (ref 40–120)
ALT FLD-CCNC: 5 U/L — LOW (ref 10–45)
ANION GAP SERPL CALC-SCNC: 13 MMOL/L — SIGNIFICANT CHANGE UP (ref 5–17)
AST SERPL-CCNC: 14 U/L — SIGNIFICANT CHANGE UP (ref 10–40)
BASOPHILS # BLD AUTO: 0 K/UL — SIGNIFICANT CHANGE UP (ref 0–0.2)
BASOPHILS NFR BLD AUTO: 0.3 % — SIGNIFICANT CHANGE UP (ref 0–2)
BILIRUB SERPL-MCNC: 1 MG/DL — SIGNIFICANT CHANGE UP (ref 0.2–1.2)
BUN SERPL-MCNC: 19 MG/DL — SIGNIFICANT CHANGE UP (ref 7–23)
CALCIUM SERPL-MCNC: 8.7 MG/DL — SIGNIFICANT CHANGE UP (ref 8.4–10.5)
CHLORIDE SERPL-SCNC: 105 MMOL/L — SIGNIFICANT CHANGE UP (ref 96–108)
CO2 SERPL-SCNC: 25 MMOL/L — SIGNIFICANT CHANGE UP (ref 22–31)
CREAT SERPL-MCNC: 1.5 MG/DL — HIGH (ref 0.5–1.3)
EOSINOPHIL # BLD AUTO: 0.2 K/UL — SIGNIFICANT CHANGE UP (ref 0–0.5)
EOSINOPHIL NFR BLD AUTO: 3.1 % — SIGNIFICANT CHANGE UP (ref 0–6)
GGT SERPL-CCNC: 65 U/L — HIGH (ref 9–50)
GLUCOSE BLDC GLUCOMTR-MCNC: 109 MG/DL — HIGH (ref 70–99)
GLUCOSE BLDC GLUCOMTR-MCNC: 113 MG/DL — HIGH (ref 70–99)
GLUCOSE BLDC GLUCOMTR-MCNC: 88 MG/DL — SIGNIFICANT CHANGE UP (ref 70–99)
GLUCOSE BLDC GLUCOMTR-MCNC: 95 MG/DL — SIGNIFICANT CHANGE UP (ref 70–99)
GLUCOSE SERPL-MCNC: 94 MG/DL — SIGNIFICANT CHANGE UP (ref 70–99)
HBA1C BLD-MCNC: 6 % — HIGH (ref 4–5.6)
HCT VFR BLD CALC: 38.6 % — LOW (ref 39–50)
HGB BLD-MCNC: 11.6 G/DL — LOW (ref 13–17)
LYMPHOCYTES # BLD AUTO: 0.8 K/UL — LOW (ref 1–3.3)
LYMPHOCYTES # BLD AUTO: 11.7 % — LOW (ref 13–44)
MAGNESIUM SERPL-MCNC: 1.8 MG/DL — SIGNIFICANT CHANGE UP (ref 1.6–2.6)
MCHC RBC-ENTMCNC: 26.8 PG — LOW (ref 27–34)
MCHC RBC-ENTMCNC: 30 GM/DL — LOW (ref 32–36)
MCV RBC AUTO: 89.4 FL — SIGNIFICANT CHANGE UP (ref 80–100)
MONOCYTES # BLD AUTO: 0.5 K/UL — SIGNIFICANT CHANGE UP (ref 0–0.9)
MONOCYTES NFR BLD AUTO: 8 % — SIGNIFICANT CHANGE UP (ref 2–14)
NEUTROPHILS # BLD AUTO: 5.2 K/UL — SIGNIFICANT CHANGE UP (ref 1.8–7.4)
NEUTROPHILS NFR BLD AUTO: 76.8 % — SIGNIFICANT CHANGE UP (ref 43–77)
PHOSPHATE SERPL-MCNC: 3.5 MG/DL — SIGNIFICANT CHANGE UP (ref 2.5–4.5)
PLATELET # BLD AUTO: 183 K/UL — SIGNIFICANT CHANGE UP (ref 150–400)
POTASSIUM SERPL-MCNC: 3.8 MMOL/L — SIGNIFICANT CHANGE UP (ref 3.5–5.3)
POTASSIUM SERPL-SCNC: 3.8 MMOL/L — SIGNIFICANT CHANGE UP (ref 3.5–5.3)
PROT SERPL-MCNC: 7.3 G/DL — SIGNIFICANT CHANGE UP (ref 6–8.3)
RBC # BLD: 4.32 M/UL — SIGNIFICANT CHANGE UP (ref 4.2–5.8)
RBC # FLD: 15.7 % — HIGH (ref 10.3–14.5)
SODIUM SERPL-SCNC: 143 MMOL/L — SIGNIFICANT CHANGE UP (ref 135–145)
WBC # BLD: 6.8 K/UL — SIGNIFICANT CHANGE UP (ref 3.8–10.5)
WBC # FLD AUTO: 6.8 K/UL — SIGNIFICANT CHANGE UP (ref 3.8–10.5)

## 2019-09-26 PROCEDURE — 99233 SBSQ HOSP IP/OBS HIGH 50: CPT

## 2019-09-26 PROCEDURE — 99223 1ST HOSP IP/OBS HIGH 75: CPT | Mod: GC

## 2019-09-26 PROCEDURE — 93279 PRGRMG DEV EVAL PM/LDLS PM: CPT | Mod: 26

## 2019-09-26 RX ORDER — APIXABAN 2.5 MG/1
5 TABLET, FILM COATED ORAL EVERY 12 HOURS
Refills: 0 | Status: DISCONTINUED | OUTPATIENT
Start: 2019-09-26 | End: 2019-09-30

## 2019-09-26 RX ORDER — HYDRALAZINE HCL 50 MG
75 TABLET ORAL THREE TIMES A DAY
Refills: 0 | Status: DISCONTINUED | OUTPATIENT
Start: 2019-09-26 | End: 2019-10-02

## 2019-09-26 RX ORDER — LISINOPRIL 2.5 MG/1
10 TABLET ORAL DAILY
Refills: 0 | Status: DISCONTINUED | OUTPATIENT
Start: 2019-09-26 | End: 2019-09-27

## 2019-09-26 RX ADMIN — Medication 75 MILLIGRAM(S): at 14:07

## 2019-09-26 RX ADMIN — LISINOPRIL 10 MILLIGRAM(S): 2.5 TABLET ORAL at 13:59

## 2019-09-26 RX ADMIN — ISOSORBIDE DINITRATE 20 MILLIGRAM(S): 5 TABLET ORAL at 21:50

## 2019-09-26 RX ADMIN — Medication 50 MILLIGRAM(S): at 05:14

## 2019-09-26 RX ADMIN — Medication 80 MILLIGRAM(S): at 17:40

## 2019-09-26 RX ADMIN — Medication 80 MILLIGRAM(S): at 05:15

## 2019-09-26 RX ADMIN — ISOSORBIDE DINITRATE 20 MILLIGRAM(S): 5 TABLET ORAL at 05:14

## 2019-09-26 RX ADMIN — APIXABAN 5 MILLIGRAM(S): 2.5 TABLET, FILM COATED ORAL at 20:58

## 2019-09-26 RX ADMIN — HEPARIN SODIUM 5000 UNIT(S): 5000 INJECTION INTRAVENOUS; SUBCUTANEOUS at 05:15

## 2019-09-26 RX ADMIN — ISOSORBIDE DINITRATE 20 MILLIGRAM(S): 5 TABLET ORAL at 13:18

## 2019-09-26 RX ADMIN — Medication 75 MILLIGRAM(S): at 21:50

## 2019-09-26 RX ADMIN — HEPARIN SODIUM 5000 UNIT(S): 5000 INJECTION INTRAVENOUS; SUBCUTANEOUS at 13:18

## 2019-09-26 NOTE — CONSULT NOTE ADULT - ASSESSMENT
ASSESSMENT/PLAN: 	  69 year old man with prior NHL (treated with R-CHOP 2004 and BR in 2010), chronic HFrEF (likely 2/2 Doxorubicin, EF:45%, decreased RVSF), CHB s/p micra PPM 5/2019, severe MR pending mitraclip evaluation, now p/w ADHF due to medication non-compliance.    Patient stopped taking his medications because he felt well. He is grossly volume overloaded on exam with elevated JVD, MOISES. 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 He understands that this is against medical advice and the risks associated with discharge, including death, before medical optimization       Problem/Plan - 1:  ·  Problem: Acute on chronic systolic CHF.  Plan:   -c/w lasix 80mg IV BID, goal net neg 1-2L  -increase hydralazine to 75mg TID, c/w isordil 40mg TID  -start lisinopril 10mg daily  -please give lisinopril dose today and increase hydralazine for PM dose  -hold coreg in setting of ADHF  -Strict I/Os, daily standing weights.     Problem/Plan - 2:  ·  Problem: Severe mitral regurgitation.  Plan:   -c/w diuresis and afterload reduction as above  -will repeat TTE when euvolemic    Problem/Plan - 3:  ·  Problem: Stage 3 chronic kidney disease.  Plan: SCr at baseline   Cont current management as above    Problem/Plan - 4:  ·  Problem: CHB s/p PPM.  Plan:   - would benefit from CRT-P given high pacing requirements; may help with improving EF        Sonya Bales MD  Cardiology Fellow   [phone], M-F 7:30A-5P    All Cardiology service information can be found on [email or web address], password: [email or web address]. ASSESSMENT/PLAN: 	  69 year old man with prior NHL (treated with R-CHOP 2004 and BR in 2010), chronic HFrEF (likely 2/2 Doxorubicin, EF:45%, decreased RVSF), CHB s/p micra PPM 5/2019, severe MR pending mitraclip evaluation, now p/w ADHF due to medication non-compliance.    Patient stopped taking his medications because he felt well. He is grossly volume overloaded on exam with elevated JVD, MOISES. 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 He understands that this is against medical advice and the risks associated with discharge, including death, before medical optimization       Problem/Plan - 1:  ·  Problem: Acute on chronic systolic CHF.  Plan:   -c/w lasix 80mg IV BID, goal net neg 1-2L  -increase hydralazine to 75mg TID, c/w isordil 20mg TID  -start lisinopril 10mg daily  -please give lisinopril dose today and increase hydralazine for PM dose  -hold coreg in setting of ADHF  -Strict I/Os, daily standing weights.     Problem/Plan - 2:  ·  Problem: Severe mitral regurgitation.  Plan:   -c/w diuresis and afterload reduction as above  -will repeat TTE when euvolemic    Problem/Plan - 3:  ·  Problem: Stage 3 chronic kidney disease.  Plan: SCr at baseline   Cont current management as above    Problem/Plan - 4:  ·  Problem: CHB s/p PPM.  Plan:   - would benefit from CRT-P given high pacing requirements; may help with improving EF        Sonya Bales MD  Cardiology Fellow   [phone], M-F 7:30A-5P    All Cardiology service information can be found on [email or web address], password: [email or web address].

## 2019-09-26 NOTE — CONSULT NOTE ADULT - ATTENDING COMMENTS
My overall assessment is that this is a 68 YO M with a history of ACC/AHA Stage C HF with borderline EF (LV 5.5 cm, EF 45%), severe functional MR, CHB s/p micra PPM 5/2019, and CKD III (baseline Cr 1.5), NHL with history of doxarubicin exposure presenting with acute decompensated heart failure. He is roughly 30 lbs above his discharge weight and the trigger is not taking his medications for 3 months because he felt well and did not refill prescriptions. He was also found to have atrial flutter which is a new diagnosis. In addition to diuresis he has other avenues of improving his HF trajectory (MitraClip, CRT optimization, restoring sinus rhythm) however he is demanding to leave tomorrow without full optimization despite understanding risks.     Review of pertinent studies reveals:  EKG on admission: atrial flutter, underlying RV pacing, PVCs   TTE 5/2019: LV 5.5 cm, EF 45%, severe functional MR, moderate TR  RHC 5/2019: RA 17, PA 75/36 (48), PCWP 27, [place] CO/CI 4.9/2.2  LHC 5/2019: non-obstructive CAD aside from borderline significant lesions in small vessels     The plan for today is as follows:  - Optimize GDMT as above  - IV diuresis  - a/c for AFl  - start ASA/statin for CAD  - Would ultimately benefit from CRT upgrade, evaluation for MitraClip after euvolemic, and possibly DCCV however he states he must leave tomorrow for trip to [place]. He will be far from optimized and is at high risk of re-admission. Discharge tomorrow would be against medical advice.     Please feel free to call me with any questions: [phone]

## 2019-09-26 NOTE — PROGRESS NOTE ADULT - SUBJECTIVE AND OBJECTIVE BOX
Patient is a 69y old  Male who presents with a chief complaint of SOB, LE edema (26 Sep 2019 11:06)        SUBJECTIVE / OVERNIGHT EVENTS: no acute complaints - stating that he is going home today      MEDICATIONS  (STANDING):  dextrose 5%. 1000 milliLiter(s) (50 mL/Hr) IV Continuous <Continuous>  dextrose 50% Injectable 12.5 Gram(s) IV Push once  dextrose 50% Injectable 25 Gram(s) IV Push once  dextrose 50% Injectable 25 Gram(s) IV Push once  furosemide   Injectable 80 milliGRAM(s) IV Push two times a day  heparin  Injectable 5000 Unit(s) SubCutaneous every 8 hours  hydrALAZINE 75 milliGRAM(s) Oral three times a day  influenza   Vaccine 0.5 milliLiter(s) IntraMuscular once  insulin lispro (HumaLOG) corrective regimen sliding scale   SubCutaneous Before meals and at bedtime  isosorbide   dinitrate Tablet (ISORDIL) 20 milliGRAM(s) Oral three times a day  lisinopril 10 milliGRAM(s) Oral daily    MEDICATIONS  (PRN):  dextrose 40% Gel 15 Gram(s) Oral once PRN Blood Glucose LESS THAN 70 milliGRAM(s)/deciliter  glucagon  Injectable 1 milliGRAM(s) IntraMuscular once PRN Glucose LESS THAN 70 milligrams/deciliter      Vital Signs Last 24 Hrs  T(C): 36.3 (26 Sep 2019 14:02), Max: 36.5 (26 Sep 2019 04:40)  T(F): 97.4 (26 Sep 2019 14:02), Max: 97.7 (26 Sep 2019 04:40)  HR: 58 (26 Sep 2019 14:02) (58 - 65)  BP: 144/92 (26 Sep 2019 14:02) (144/92 - 171/86)  BP(mean): --  RR: 18 (26 Sep 2019 14:02) (16 - 18)  SpO2: 97% (26 Sep 2019 14:02) (95% - 97%)  CAPILLARY BLOOD GLUCOSE      POCT Blood Glucose.: 95 mg/dL (26 Sep 2019 12:57)  POCT Blood Glucose.: 88 mg/dL (26 Sep 2019 08:47)  POCT Blood Glucose.: 131 mg/dL (25 Sep 2019 21:19)  POCT Blood Glucose.: 120 mg/dL (25 Sep 2019 17:50)    I&O's Summary    26 Sep 2019 07:01  -  26 Sep 2019 16:52  --------------------------------------------------------  IN: 420 mL / OUT: 0 mL / NET: 420 mL        PHYSICAL EXAM:  GENERAL: NAD, breathing normal  EYES: conjunctiva and sclera clear  NECK: supple, + JVD  CHEST/LUNG: decreased bs at bases, +crackles   HEART: S1 S2 RRR  ABDOMEN: +BS Soft, NT/ND  EXTREMITIES:  2+ DP Pulses, No c/c. 2+b/l LE edema up to thighs  NEUROLOGY: AAOx3, no facial droop, no focal deficits   SKIN: No rashes or lesions    LABS:                        11.6   6.8   )-----------( 183      ( 26 Sep 2019 07:09 )             38.6     09-26    143  |  105  |  19  ----------------------------<  94  3.8   |  25  |  1.50<H>    Ca    8.7      26 Sep 2019 07:09  Phos  3.5     09-26  Mg     1.8     09-26    TPro  7.3  /  Alb  3.5  /  TBili  1.0  /  DBili  x   /  AST  14  /  ALT  5<L>  /  AlkPhos  147<H>  09-26    PT/INR - ( 25 Sep 2019 11:40 )   PT: 17.0 sec;   INR: 1.47 ratio         PTT - ( 25 Sep 2019 11:40 )  PTT:31.8 sec          RADIOLOGY & ADDITIONAL TESTS:    Imaging Personally Reviewed:  Consultant(s) Notes Reviewed:    Care Discussed with Consultants/Other Providers:

## 2019-09-26 NOTE — CONSULT NOTE ADULT - CONSULT REASON
Chronic RV pacing and heart failure admission Chronic RV pacing and heart failure admission    Outpatient Cards was Krish Roman, has appt for new cardiologist MD Don

## 2019-09-26 NOTE — CONSULT NOTE ADULT - SUBJECTIVE AND OBJECTIVE BOX
Date of Admission: 9/25/19    Patient is a 69y old  Male who presents with a chief complaint of SOB, LE edema    HISTORY OF PRESENT ILLNESS:   69 M with PMH of HTN, DM2, HFrEF with EF ~45%, complete heart block s/p PPM 5/2019, non-Hodgkin lymphoma s/p chemo in 2014 (RCHOP/BR, currently in remission), CKD stage II who p/w SOB for last few days. Patient was seen in May 2019 for AMS, found to have CHB s/p micra placement. He was also found to have new HF at that time with severe MR and dilated annulus. He was diuresed significantly and discharged for outpatient mitraclip eval. However, pt did not follow up. He ran out of his medications and didn't reorder because he felt fine. Over the last few days he noticed SOB and MOISES and went to his PCP who told him to go to the hospital. He feels much better today after getting IV diuresis overnight. Denies CP, SOB, palp, dizziness, orthopnea or PND. Still w/ MOISES. He states he is leaving tomorrow because he has to take care of business in [place] for the next 2 weeks.     Allergies    No Known Allergies    Intolerances    	    MEDICATIONS:  furosemide   Injectable 80 milliGRAM(s) IV Push two times a day  heparin  Injectable 5000 Unit(s) SubCutaneous every 8 hours  hydrALAZINE 50 milliGRAM(s) Oral three times a day  isosorbide   dinitrate Tablet (ISORDIL) 20 milliGRAM(s) Oral three times a day  dextrose 40% Gel 15 Gram(s) Oral once PRN  dextrose 50% Injectable 12.5 Gram(s) IV Push once  dextrose 50% Injectable 25 Gram(s) IV Push once  dextrose 50% Injectable 25 Gram(s) IV Push once  glucagon  Injectable 1 milliGRAM(s) IntraMuscular once PRN  insulin lispro (HumaLOG) corrective regimen sliding scale   SubCutaneous Before meals and at bedtime    dextrose 5%. 1000 milliLiter(s) IV Continuous <Continuous>  influenza   Vaccine 0.5 milliLiter(s) IntraMuscular once      PAST MEDICAL & SURGICAL HISTORY:  Chronic HFrEF (EF from 2016 was 45%)  HTN  NHL (per Onc note Large cell lymphoma treated with R-CHOP. 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 No nausea, vomiting, or hematemesis  GENITOURINARY: No dysuria, frequency or hematuria  NEUROLOGICAL: No numbness or weakness  SKIN: No itching, burning, rashes, or lesions  HEME: No bleeding or bruising  MSK: No joint pains or muscle pains  All other review of systems is negative unless indicated above.    PHYSICAL EXAM:  T(C): 36.5 (09-26-19 @ 04:40), Max: 36.5 (09-26-19 @ 04:40)  HR: 65 (09-26-19 @ 04:40) (61 - 70)  BP: 171/86 (09-26-19 @ 04:40) (160/75 - 171/86)  RR: 18 (09-26-19 @ 04:40) (16 - 18)  SpO2: 96% (09-26-19 @ 04:40) (95% - 96%)  Wt(kg): --  I&O's Summary      Appearance: Normal	  HEENT:   Normal oral mucosa  Cardiovascular: Normal S1 S2, No JVD, No murmurs, No edema  Respiratory: Lungs clear to auscultation	  Psychiatry: A & O x 3, Mood & affect appropriate  Gastrointestinal:  Soft, Non-tender, + BS	  Skin: No rashes, No ecchymoses, No cyanosis	  Neurologic: Non-focal  Extremities: Normal range of motion, No clubbing, cyanosis or edema        LABS:	 	    CBC Full  -  ( 26 Sep 2019 07:09 )  WBC Count : 6.8 K/uL  Hemoglobin : 11.6 g/dL  Hematocrit : 38.6 %  Platelet Count - Automated : 183 K/uL  Mean Cell Volume : 89.4 fl  Mean Cell Hemoglobin : 26.8 pg  Mean Cell Hemoglobin Concentration : 30.0 gm/dL  Auto Neutrophil # : 5.2 K/uL  Auto Lymphocyte # : 0.8 K/uL  Auto Monocyte # : 0.5 K/uL  Auto Eosinophil # : 0.2 K/uL  Auto Basophil # : 0.0 K/uL  Auto Neutrophil % : 76.8 %  Auto Lymphocyte % : 11.7 %  Auto Monocyte % : 8.0 %  Auto Eosinophil % : 3.1 %  Auto Basophil % : 0.3 %    09-26    143  |  105  |  19  ----------------------------<  94  3.8   |  25  |  1.50<H>  09-25    144  |  106  |  18  ----------------------------<  117<H>  4.6   |  25  |  1.35<H>    Ca    8.7      26 Sep 2019 07:09  Ca    8.9      25 Sep 2019 11:40  Phos  3.5     09-26  Mg     1.8     09-26    TPro  7.3  /  Alb  3.5  /  TBili  1.0  /  DBili  x   /  AST  14  /  ALT  5<L>  /  AlkPhos  147<H>  09-26  TPro  7.9  /  Alb  3.7  /  TBili  1.0  /  DBili  x   /  AST  17  /  ALT  9<L>  /  AlkPhos  170<H>  09-25      proBNP: Serum Pro-Brain Natriuretic Peptide: 03150 pg/mL (09-25 @ 11:40)    HgA1c: Hemoglobin A1C, Whole Blood: 6.0 % (09-26 @ 08:44)    PREVIOUS DIAGNOSTIC TESTING:    Echo: < from: Transesophageal Echocardiogram w/o TTE (05.10.19 @ 10:32) >  Observations: Mitral Valve: Tethered mitral valve leaflets with normal opening.  Dilated mitral annulus. Severe mitral regurgitation. MR ERO 0.65 cm sq MR volume 79 ml. Aortic Valve/Aorta: Trileaflet aortic valve. 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 Peak left ventricular outflow tract gradient equals 3 mm Hg, mean gradient is equal to 1 mm Hg, LVOT velocity time integral equals 13 cm. LVOT diameter: 2.2 cm. Complex atheroma noted in aortic arch/descending aorta. Left Atrium: Normal left atrium. Left Ventricle: Mild to moderate  left ventricular systolic dysfunction. Normal left ventricular internal dimensions and wall thicknesses. Right Heart: Severe right atrial enlargement.   A device wire is noted in the right heart. Right ventricular enlargement with decreased right ventricular systolic function. Normal tricuspid valve. Moderate tricuspid regurgitation. Normal pulmonic valve. Pericardium/Pleura: Normal pericardium with no pericardial effusion. Left pleural effusion. Hemodynamic: Estimated right atrial pressure is 8 mm Hg. Estimated right ventricular systolic pressure equals 72 mm Hg, assuming right atrial pressure equals 8 mm Hg, consistent with severe pulmonary hypertension. Color Doppler demonstrates no evidence of a patent foramen ovale. Conclusions: 1. Tethered mitral valve leaflets with normal opening. Dilated mitral annulus. Severe mitral regurgitation. MR ERO 0.65 cm sq MR volume 79 ml. 2. Severe left atrial enlargement.    No left atrial or left atrial appendage thrombus.  Decreased left atrial appendage velocities noted. 3. Mild to moderate  left ventricular systolic dysfunction. 4. Severe right atrial enlargement.   A device wire is noted in the right heart. 5. Right ventricular enlargement with decreased right ventricular systolic function. 6. Normal tricuspid valve. Moderate tricuspid regurgitation. 7. Estimated pulmonary artery systolic pressure equals 72 mm Hg, assuming right atrial pressure equals 8 mm Hg, consistent with severe pulmonary pressures. 8. Left pleural effusion.  < end of copied text >    < from: Transthoracic Echocardiogram (05.08.19 @ 15:16) >  Dimensions:    Normal Values:  LA:     4.5    2.0 - 4.0 cm  Ao:     3.5    2.0 - 3.8 cm  SEPTUM: 0.9    0.6 - 1.2 cm  PWT:1.1    0.6 - 1.1 cm  LVIDd:  5.5    3.0 - 5.6 cm  LVIDs:         1.8 - 4.0 cm  Derived variables:  LVMI: 97 g/m2  RWT: 0.40  EF (Visual Estimate): 45 %  ------------------------------------------------------------------------  Observations: Mitral Valve: Normal appearing mitral valve leaflets. Mitral annular calcification. Eccentric mitral regurgitation directed posteriorly, probably severe. Aortic Valve/Aorta: Mildly calcified aortic valve. Normal aortic root size. (Ao: 3.5 cm at the sinuses of Valsalva). Left Atrium: Normal left atrium.  LA volume index = 28 cc/m2. Left Ventricle: Estimated LV ejection fraction 45%. Borderline left ventricular enlargement (EDV approximately 72 cc/m2). Right Heart: Normal right atrium. Moderate right ventricular enlargement. Study quality precludes accurate assessment of right ventricular systolic function.  Normal appearing tricuspid valve leaflets. At least moderate tricuspid regurgitation. Normal pulmonic valve. Pericardium/Pleura: Normal pericardium with trace pericardial effusion. Left pleural effusion. Hemodynamic: Estimated right atrial pressure 15 mm Hg, Severe pulmonary hypertension. Estimated PASP = 75 mmHg. Conclusions: Suboptimal apical windows. Estimated LV ejection fraction 45%. Eccentric mitral regurgitation directed posteriorly, probably severe. Moderate right ventricular enlargement. Study qualityprecludes accurate assessment of right ventricular systolic function. Severe pulmonary hypertension. Estimated PASP = 75 mmHg.  < end of copied text >    Cath: < from: Cardiac Cath Lab - Adult (05.10.19 @ 16:00) > CORONARY VESSELS: The coronary circulation is right dominant. LM:   --  LM: Normal. LAD:   --  LAD: Angiography showed minor luminal irregularities with no flow limiting lesions. --  D1: There was a 70 % stenosis in the middle third of the vessel segment. CX:   --  Proximal circumflex: There was a 30 % stenosis. --  Distal circumflex: There was a 60 % stenosis. There was a small vascular territory distal to the lesion. RCA:   --  Proximal RCA: There was a 40 % stenosis. COMPLICATIONS: There were no complications. Pressures:  -- Aortic Pressure (S/D/M): 105/60/79 Pressures:  -- Left Ventricle (s/edp): 136/25/-- Pressures:  -- Pulmonary Artery (S/D/M): 75/36/48 Pressures:  -- Pulmonary Capillary Wedge: 24/42/27 Pressures:  -- Right Atrium (a/v/M): 17/22/17 Pressures:  -- Right Ventricle (s/edp): 93/18/-- O2 Sats:  Baseline O2 Sats:  - HR: 41 O2 Sats:  - Rhythm: O2 Sats:  -- AO: 11.4/99/15.35 O2 Sats:  -- PA: 11.4/60.5/9.38 Outputs:  Baseline Outputs:  -- CALCULATIONS[age]  Outputs:  -- OUTPUTS: CO by Aquiles: 4.92 Outputs:  -- OUTPUTS: Aquiles cardiac index: 2.24 Outputs:  -- RESISTANCES: Pulmonary vascular resistance (Wood Units): 4.27 Outputs:  -- RESISTANCES: Systemic vascular index (dsc): 2212.79  < end of copied text >    < from: Cardiac Cath Lab - Adult (12.15.16 @ 15:49) > VENTRICLES: Global left ventricular function was severely depressed. EF estimated was 35 % and EF by echo was 45 %. CORONARY VESSELS: The coronary circulation is right dominant. LM:   --  LM: Angiography showed minor luminal irregularities with no flow limiting lesions. LAD:   --  LAD: Angiography showed minor luminal irregularities with no flow limiting lesions. CX:   --  Proximal circumflex: There was a 30 % stenosis. --  Distal circumflex: There was a 70 % stenosis. The lesion was eccentric. small distal therapy RI:   --  Ramus intermedius: Normal. RCA:   --  RCA: Angiography showed minor luminal irregularities with no flow limiting lesions. COMPLICATIONS: There were no complications. < end of copied text >

## 2019-09-26 NOTE — CONSULT NOTE ADULT - SUBJECTIVE AND OBJECTIVE BOX
CHIEF COMPLAINT:  SOB, lower extremity edema prior to admission, which patient states is improving     HISTORY OF PRESENT ILLNESS:  69 M with PMH of HTN, DM2, HFrEF with EF ~45%, complete heart block s/p Micra pacemaker 5/2019, non-Hodgkin lymphoma s/p chemo in 2014 (RCHOP/BR, currently in remission), CKD stage II who p/w SOB for last few days and  lower extremity edema. Patient was seen in May 2019 for AMS, newly diagnosed dementia, found to have CHB s/p micra placement. He was also found to have new HF at that time with severe MR and dilated annulus. He was diuresed significantly and discharged for outpatient mitraclip eval. However, pt did not follow up. He ran out of his medications approximately 2 months ago and has not taken any of his cardiac meds since. Denies CP, SOB, palp, dizziness, orthopnea or PND.      Allergies    No Known Allergies    Intolerances    	    MEDICATIONS:  apixaban 5 milliGRAM(s) Oral every 12 hours  furosemide   Injectable 80 milliGRAM(s) IV Push two times a day  hydrALAZINE 75 milliGRAM(s) Oral three times a day  isosorbide   dinitrate Tablet (ISORDIL) 20 milliGRAM(s) Oral three times a day  lisinopril 10 milliGRAM(s) Oral daily  dextrose 40% Gel 15 Gram(s) Oral once PRN  dextrose 50% Injectable 12.5 Gram(s) IV Push once  dextrose 50% Injectable 25 Gram(s) IV Push once  dextrose 50% Injectable 25 Gram(s) IV Push once  glucagon  Injectable 1 milliGRAM(s) IntraMuscular once PRN  insulin lispro (HumaLOG) corrective regimen sliding scale   SubCutaneous Before meals and at bedtime  dextrose 5%. 1000 milliLiter(s) IV Continuous <Continuous>  influenza   Vaccine 0.5 milliLiter(s) IntraMuscular once      PAST MEDICAL & SURGICAL HISTORY:  Pleural effusion  Moderate mitral regurgitation  Systolic heart failure  Rhinitis, allergic  Essential hypertension  DM type 2 (diabetes mellitus, type 2): Never on insulin  Non-Hodgkins Lymphoma: In remissino for &gt; 10 years  Cardiac pacemaker  Non-Hodgkin lymphoma: h/o axillary dissection      FAMILY HISTORY:  Family history of non-Hodgkin's lymphoma (Sibling)  Family history of CHF (congestive heart failure): Mother      SOCIAL HISTORY:     Lives at home alone. Retired [employer] manager. Former 1ppd smoker x 40 years, quit 40 years ago. No drug or ETOH use.      REVIEW OF SYSTEMS:  See HPI. Otherwise, 10 point ROS done and otherwise negative.    PHYSICAL EXAM:  T(C): 36.3 (09-26-19 @ 14:02), Max: 36.5 (09-26-19 @ 04:40)  HR: 58 (09-26-19 @ 14:02) (58 - 65)  BP: 144/92 (09-26-19 @ 14:02) (144/92 - 171/86)  RR: 18 (09-26-19 @ 14:02) (16 - 18)  SpO2: 97% (09-26-19 @ 14:02) (95% - 97%)  Wt(kg): --  I&O's Summary    26 Sep 2019 07:01  -  26 Sep 2019 18:21  --------------------------------------------------------  IN: 420 mL / OUT: 0 mL / NET: 420 mL        Appearance: Normal	  Cardiovascular: Normal S1 S2, No JVD, No murmurs, No edema  Respiratory: Left lower lobe crackles  Psychiatry: A & O x 3, poor historian, Mood & affect appropriate  Gastrointestinal:  Soft, Non-tender, + BS	  Skin: No rashes, No ecchymoses, No cyanosis	  Extremities: Normal range of motion, +2 pitting bilateral lower extremity edema  Vascular: Peripheral pulses palpable 2+ bilaterally        LABS:	 	    CBC Full  -  ( 26 Sep 2019 07:09 )  WBC Count : 6.8 K/uL  Hemoglobin : 11.6 g/dL  Hematocrit : 38.6 %  Platelet Count - Automated : 183 K/uL  Mean Cell Volume : 89.4 fl  Mean Cell Hemoglobin : 26.8 pg  Mean Cell Hemoglobin Concentration : 30.0 gm/dL  Auto Neutrophil # : 5.2 K/uL  Auto Lymphocyte # : 0.8 K/uL  Auto Monocyte # : 0.5 K/uL  Auto Eosinophil # : 0.2 K/uL  Auto Basophil # : 0.0 K/uL  Auto Neutrophil % : 76.8 %  Auto Lymphocyte % : 11.7 %  Auto Monocyte % : 8.0 %  Auto Eosinophil % : 3.1 %  Auto Basophil % : 0.3 %    09-26    143  |  105  |  19  ----------------------------<  94  3.8   |  25  |  1.50<H>  09-25    144  |  106  |  18  ----------------------------<  117<H>  4.6   |  25  |  1.35<H>    Ca    8.7      26 Sep 2019 07:09  Ca    8.9      25 Sep 2019 11:40  Phos  3.5     09-26  Mg     1.8     09-26    TPro  7.3  /  Alb  3.5  /  TBili  1.0  /  DBili  x   /  AST  14  /  ALT  5<L>  /  AlkPhos  147<H>  09-26  TPro  7.9  /  Alb  3.7  /  TBili  1.0  /  DBili  x   /  AST  17  /  ALT  9<L>  /  AlkPhos  170<H>  09-25      HgA1c: Hemoglobin A1C, Whole Blood: 6.0 % (09-26 @ 08:44)    TELEMETRY:  Atrial Flutter V paced 60'sbpm	    ECG: Atrial Flutter V paced 67bpm with PVC 9/25/19 	  RADIOLOGY:  < from: Transthoracic Echocardiogram (05.08.19 @ 15:16) >    Patient name: NIK GRIMM  YOB: 1950   Age: 68 (M)   MR#: [id number]  Study Date: 5/8/2019  Location: [address] [place], [place] [id number]: Ade Tabor RDCS  Study quality: Technically fair  Referring Physician: Sathish Proctor MD  Blood Pressure: 142/73 mmHg  Height: 183 cm  Weight: 97 kg  BSA: 2.2 m2  ------------------------------------------------------------------------  PROCEDURE: Transthoracic echocardiogram with 2-D, M-Mode  and complete spectral and color flow Doppler.  INDICATION: Abnormal electrocardiogram (ECG) (EKG) (R94.31)  ------------------------------------------------------------------------  Dimensions:    Normal Values:  LA:     4.5    2.0 - 4.0 cm  Ao:     3.5    2.0 - 3.8 cm  SEPTUM: 0.9    0.6 - 1.2 cm  PWT:1.1    0.6 - 1.1 cm  LVIDd:  5.5    3.0 - 5.6 cm  LVIDs:         1.8 - 4.0 cm  Derived variables:  LVMI: 97 g/m2  RWT: 0.40  EF (Visual Estimate): 45 %  ------------------------------------------------------------------------  Observations:  Mitral Valve: Normal appearing mitral valve leaflets.  Mitral annular calcification.  Eccentric mitral regurgitation directed posteriorly,  probably severe.  Aortic Valve/Aorta: Mildly calcified aortic valve.  Normal aortic root size. (Ao: 3.5 cm at the sinuses of  Valsalva).  Left Atrium: Normal left atrium.  LA volume index = 28  cc/m2.  Left Ventricle: Estimated LV ejection fraction 45%.  Borderline left ventricular enlargement (EDV approximately  72 cc/m2).  Right Heart: Normal right atrium. Moderate right  ventricular enlargement. Study quality precludes accurate  assessment of right ventricular systolic function.  Normal  appearing tricuspid valve leaflets. At least moderate  tricuspid regurgitation. Normal pulmonic valve.  Pericardium/Pleura: Normal pericardium with trace  pericardial effusion.  Left pleural effusion.  Hemodynamic: Estimated right atrial pressure 15 mm Hg,  Severe pulmonary hypertension. Estimated PASP = 75 mmHg.  ------------------------------------------------------------------------  Conclusions:  Suboptimal apical windows.  Estimated LV ejection fraction 45%.  Eccentric mitral regurgitation directed posteriorly,  probably severe.  Moderate right ventricular enlargement. Study quality  precludes accurate assessment of right ventricular systolic  function.  Severe pulmonary hypertension. Estimated PASP = 75 mmHg.  ------------------------------------------------------------------------  Confirmed on  5/8/2019 - 18:04:38 by Juan Alberto Garcia M.D.  ------------------------------------------------------------------------    < end of copied text > CHIEF COMPLAINT:  SOB, lower extremity edema prior to admission, which patient states is improving     HISTORY OF PRESENT ILLNESS:  69 M with PMH of HTN, DM2, HFrEF with EF ~45%, complete heart block s/p Micra pacemaker 5/2019, non-Hodgkin lymphoma s/p chemo in 2014 (RCHOP/BR, currently in remission), CKD stage II who p/w SOB for last few days and  lower extremity edema. Patient was seen in May 2019 for AMS, newly diagnosed dementia, found to have CHB s/p micra placement. He was also found to have new HF at that time with severe MR and dilated annulus. He was diuresed significantly and discharged for outpatient mitraclip eval. However, pt did not follow up. He ran out of his medications approximately 2 months ago and has not taken any of his cardiac meds since. Denies CP, SOB, palp, dizziness, orthopnea or PND.      Allergies    No Known Allergies    Intolerances    	    MEDICATIONS:  apixaban 5 milliGRAM(s) Oral every 12 hours  furosemide   Injectable 80 milliGRAM(s) IV Push two times a day  hydrALAZINE 75 milliGRAM(s) Oral three times a day  isosorbide   dinitrate Tablet (ISORDIL) 20 milliGRAM(s) Oral three times a day  lisinopril 10 milliGRAM(s) Oral daily  dextrose 40% Gel 15 Gram(s) Oral once PRN  dextrose 50% Injectable 12.5 Gram(s) IV Push once  dextrose 50% Injectable 25 Gram(s) IV Push once  dextrose 50% Injectable 25 Gram(s) IV Push once  glucagon  Injectable 1 milliGRAM(s) IntraMuscular once PRN  insulin lispro (HumaLOG) corrective regimen sliding scale   SubCutaneous Before meals and at bedtime  dextrose 5%. 1000 milliLiter(s) IV Continuous <Continuous>  influenza   Vaccine 0.5 milliLiter(s) IntraMuscular once      PAST MEDICAL & SURGICAL HISTORY:  Pleural effusion  Moderate mitral regurgitation  Systolic heart failure  Rhinitis, allergic  Essential hypertension  DM type 2 (diabetes mellitus, type 2): Never on insulin  Non-Hodgkins Lymphoma: In remissino for &gt; 10 years  Cardiac pacemaker  Non-Hodgkin lymphoma: h/o axillary dissection      FAMILY HISTORY:  Family history of non-Hodgkin's lymphoma (Sibling)  Family history of CHF (congestive heart failure): Mother      SOCIAL HISTORY:     Lives at home alone. 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 No drug or ETOH use.      REVIEW OF SYSTEMS:  See HPI. Otherwise, 10 point ROS done and otherwise negative.    PHYSICAL EXAM:  T(C): 36.3 (09-26-19 @ 14:02), Max: 36.5 (09-26-19 @ 04:40)  HR: 58 (09-26-19 @ 14:02) (58 - 65)  BP: 144/92 (09-26-19 @ 14:02) (144/92 - 171/86)  RR: 18 (09-26-19 @ 14:02) (16 - 18)  SpO2: 97% (09-26-19 @ 14:02) (95% - 97%)  Wt(kg): --  I&O's Summary    26 Sep 2019 07:01  -  26 Sep 2019 18:21  --------------------------------------------------------  IN: 420 mL / OUT: 0 mL / NET: 420 mL        Appearance: Normal	  Cardiovascular: Normal S1 S2, No JVD, No murmurs, No edema  Respiratory: Left lower lobe crackles  Psychiatry: A & O x 3, poor historian, Mood & affect appropriate  Gastrointestinal:  Soft, Non-tender, + BS	  Skin: No rashes, No ecchymoses, No cyanosis	  Extremities: Normal range of motion, +2 pitting bilateral lower extremity edema  Vascular: Peripheral pulses palpable 2+ bilaterally        LABS:	 	    CBC Full  -  ( 26 Sep 2019 07:09 )  WBC Count : 6.8 K/uL  Hemoglobin : 11.6 g/dL  Hematocrit : 38.6 %  Platelet Count - Automated : 183 K/uL  Mean Cell Volume : 89.4 fl  Mean Cell Hemoglobin : 26.8 pg  Mean Cell Hemoglobin Concentration : 30.0 gm/dL  Auto Neutrophil # : 5.2 K/uL  Auto Lymphocyte # : 0.8 K/uL  Auto Monocyte # : 0.5 K/uL  Auto Eosinophil # : 0.2 K/uL  Auto Basophil # : 0.0 K/uL  Auto Neutrophil % : 76.8 %  Auto Lymphocyte % : 11.7 %  Auto Monocyte % : 8.0 %  Auto Eosinophil % : 3.1 %  Auto Basophil % : 0.3 %    09-26    143  |  105  |  19  ----------------------------<  94  3.8   |  25  |  1.50<H>  09-25    144  |  106  |  18  ----------------------------<  117<H>  4.6   |  25  |  1.35<H>    Ca    8.7      26 Sep 2019 07:09  Ca    8.9      25 Sep 2019 11:40  Phos  3.5     09-26  Mg     1.8     09-26    TPro  7.3  /  Alb  3.5  /  TBili  1.0  /  DBili  x   /  AST  14  /  ALT  5<L>  /  AlkPhos  147<H>  09-26  TPro  7.9  /  Alb  3.7  /  TBili  1.0  /  DBili  x   /  AST  17  /  ALT  9<L>  /  AlkPhos  170<H>  09-25      HgA1c: Hemoglobin A1C, Whole Blood: 6.0 % (09-26 @ 08:44)    TELEMETRY:  Atrial Flutter V paced 60'sbpm	    ECG: Atrial Flutter V paced 67bpm with PVC 9/25/19 	  RADIOLOGY:  < from: Transthoracic Echocardiogram (05.08.19 @ 15:16) >    Patient name: NIK GRIMM  YOB: 1950   Age: 68 (M)   MR#: [id number]  Study Date: 5/8/2019  Location: [address] [place], [place] [id number]: Ade Tabor RDCS  Study quality: Technically fair  Referring Physician: Sathish Proctor MD  Blood Pressure: 142/73 mmHg  Height: 183 cm  Weight: 97 kg  BSA: 2.2 m2  ------------------------------------------------------------------------  PROCEDURE: Transthoracic echocardiogram with 2-D, M-Mode  and complete spectral and color flow Doppler.  INDICATION: Abnormal electrocardiogram (ECG) (EKG) (R94.31)  ------------------------------------------------------------------------  Dimensions:    Normal Values:  LA:     4.5    2.0 - 4.0 cm  Ao:     3.5    2.0 - 3.8 cm  SEPTUM: 0.9    0.6 - 1.2 cm  PWT:1.1    0.6 - 1.1 cm  LVIDd:  5.5    3.0 - 5.6 cm  LVIDs:         1.8 - 4.0 cm  Derived variables:  LVMI: 97 g/m2  RWT: 0.40  EF (Visual Estimate): 45 %  ------------------------------------------------------------------------  Observations:  Mitral Valve: Normal appearing mitral valve leaflets.  Mitral annular calcification.  Eccentric mitral regurgitation directed posteriorly,  probably severe.  Aortic Valve/Aorta: Mildly calcified aortic valve.  Normal aortic root size. (Ao: 3.5 cm at the sinuses of  Valsalva).  Left Atrium: Normal left atrium.  LA volume index = 28  cc/m2.  Left Ventricle: Estimated LV ejection fraction 45%.  Borderline left ventricular enlargement (EDV approximately  72 cc/m2).  Right Heart: Normal right atrium. Moderate right  ventricular enlargement. Study quality precludes accurate  assessment of right ventricular systolic function.  Normal  appearing tricuspid valve leaflets. At least moderate  tricuspid regurgitation. Normal pulmonic valve.  Pericardium/Pleura: Normal pericardium with trace  pericardial effusion.  Left pleural effusion.  Hemodynamic: Estimated right atrial pressure 15 mm Hg,  Severe pulmonary hypertension. Estimated PASP = 75 mmHg.  ------------------------------------------------------------------------  Conclusions:  Suboptimal apical windows.  Estimated LV ejection fraction 45%.  Eccentric mitral regurgitation directed posteriorly,  probably severe.  Moderate right ventricular enlargement. Study quality  precludes accurate assessment of right ventricular systolic  function.  Severe pulmonary hypertension. Estimated PASP = 75 mmHg.  ------------------------------------------------------------------------  Confirmed on  5/8/2019 - 18:04:38 by Juan Alberto Garcia M.D.  ------------------------------------------------------------------------    < end of copied text >    < from: Cardiac Cath Lab - Adult (05.10.19 @ 16:00) >  CORONARY VESSELS: The coronary circulation is right dominant.  LM:   --  LM: Normal.  LAD:   --  LAD: Angiography showed minor luminal irregularities with no  flow limiting lesions.  --  D1: There was a 70 % stenosis in the middle third of the vessel  segment.  CX:   --  Proximal circumflex: There was a 30 % stenosis.  --  Distal circumflex: There was a 60 % stenosis. There was a small  vascular territory distal to the lesion.  RCA:   --  Proximal RCA: There was a 40 % stenosis.  COMPLICATIONS: There were no complications.  DIAGNOSTIC IMPRESSIONS: Patient has severe pulmonary hypertension wih large  cv wave and mild CAD disease as descibed  DIAGNOSTIC RECOMMENDATIONS: surgical/ structiral heart team evaluation of  MR and evalution of worsening mental status    < end of copied text > CHIEF COMPLAINT:  SOB, lower extremity edema prior to admission, which patient states is improving     HISTORY OF PRESENT ILLNESS:  69 M with PMH of HTN, DM2, HFrEF with EF ~45%, complete heart block s/p Micra pacemaker 5/2019, non-Hodgkin lymphoma s/p chemo in 2014 (RCHOP/BR, currently in remission), CKD stage II who p/w SOB for last few days and  lower extremity edema. Patient was seen in May 2019 for AMS, newly diagnosed dementia, found to have CHB s/p micra placement. He was also found to have new HF at that time with severe MR and dilated annulus. He was diuresed significantly and discharged for outpatient mitraclip eval. However, pt did not follow up. He ran out of his medications approximately 2 months ago and has not taken any of his cardiac meds since. Denies CP, SOB, palp, dizziness, orthopnea or PND.    Daughter (Vonnie): [phone]      Allergies    No Known Allergies    Intolerances    	    MEDICATIONS:  apixaban 5 milliGRAM(s) Oral every 12 hours  furosemide   Injectable 80 milliGRAM(s) IV Push two times a day  hydrALAZINE 75 milliGRAM(s) Oral three times a day  isosorbide   dinitrate Tablet (ISORDIL) 20 milliGRAM(s) Oral three times a day  lisinopril 10 milliGRAM(s) Oral daily  dextrose 40% Gel 15 Gram(s) Oral once PRN  dextrose 50% Injectable 12.5 Gram(s) IV Push once  dextrose 50% Injectable 25 Gram(s) IV Push once  dextrose 50% Injectable 25 Gram(s) IV Push once  glucagon  Injectable 1 milliGRAM(s) IntraMuscular once PRN  insulin lispro (HumaLOG) corrective regimen sliding scale   SubCutaneous Before meals and at bedtime  dextrose 5%. 1000 milliLiter(s) IV Continuous <Continuous>  influenza   Vaccine 0.5 milliLiter(s) IntraMuscular once      PAST MEDICAL & SURGICAL HISTORY:  Pleural effusion  Moderate mitral regurgitation  Systolic heart failure  Rhinitis, allergic  Essential hypertension  DM type 2 (diabetes mellitus, type 2): Never on insulin  Non-Hodgkins Lymphoma: In remissino for &gt; 10 years  Cardiac pacemaker  Non-Hodgkin lymphoma: h/o axillary dissection      FAMILY HISTORY:  Family history of non-Hodgkin's lymphoma (Sibling)  Family history of CHF (congestive heart failure): Mother      SOCIAL HISTORY:     Lives at home alone. 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 No drug or ETOH use.      REVIEW OF SYSTEMS:  See HPI. Otherwise, 10 point ROS done and otherwise negative.    PHYSICAL EXAM:  T(C): 36.3 (09-26-19 @ 14:02), Max: 36.5 (09-26-19 @ 04:40)  HR: 58 (09-26-19 @ 14:02) (58 - 65)  BP: 144/92 (09-26-19 @ 14:02) (144/92 - 171/86)  RR: 18 (09-26-19 @ 14:02) (16 - 18)  SpO2: 97% (09-26-19 @ 14:02) (95% - 97%)  Wt(kg): --  I&O's Summary    26 Sep 2019 07:01  -  26 Sep 2019 18:21  --------------------------------------------------------  IN: 420 mL / OUT: 0 mL / NET: 420 mL        Appearance: Normal	  Cardiovascular: Normal S1 S2, No JVD, No murmurs, No edema  Respiratory: Left lower lobe crackles  Psychiatry: A & O x 3, poor historian, Mood & affect appropriate  Gastrointestinal:  Soft, Non-tender, + BS	  Skin: No rashes, No ecchymoses, No cyanosis	  Extremities: Normal range of motion, +2 pitting bilateral lower extremity edema  Vascular: Peripheral pulses palpable 2+ bilaterally        LABS:	 	    CBC Full  -  ( 26 Sep 2019 07:09 )  WBC Count : 6.8 K/uL  Hemoglobin : 11.6 g/dL  Hematocrit : 38.6 %  Platelet Count - Automated : 183 K/uL  Mean Cell Volume : 89.4 fl  Mean Cell Hemoglobin : 26.8 pg  Mean Cell Hemoglobin Concentration : 30.0 gm/dL  Auto Neutrophil # : 5.2 K/uL  Auto Lymphocyte # : 0.8 K/uL  Auto Monocyte # : 0.5 K/uL  Auto Eosinophil # : 0.2 K/uL  Auto Basophil # : 0.0 K/uL  Auto Neutrophil % : 76.8 %  Auto Lymphocyte % : 11.7 %  Auto Monocyte % : 8.0 %  Auto Eosinophil % : 3.1 %  Auto Basophil % : 0.3 %    09-26    143  |  105  |  19  ----------------------------<  94  3.8   |  25  |  1.50<H>  09-25    144  |  106  |  18  ----------------------------<  117<H>  4.6   |  25  |  1.35<H>    Ca    8.7      26 Sep 2019 07:09  Ca    8.9      25 Sep 2019 11:40  Phos  3.5     09-26  Mg     1.8     09-26    TPro  7.3  /  Alb  3.5  /  TBili  1.0  /  DBili  x   /  AST  14  /  ALT  5<L>  /  AlkPhos  147<H>  09-26  TPro  7.9  /  Alb  3.7  /  TBili  1.0  /  DBili  x   /  AST  17  /  ALT  9<L>  /  AlkPhos  170<H>  09-25      HgA1c: Hemoglobin A1C, Whole Blood: 6.0 % (09-26 @ 08:44)    TELEMETRY:  Atrial Flutter V paced 60'sbpm	    ECG: Atrial Flutter V paced 67bpm with PVC 9/25/19 	  RADIOLOGY:  < from: Transthoracic Echocardiogram (05.08.19 @ 15:16) >    Patient name: NIK GRIMM  YOB: 1950   Age: 68 (M)   MR#: [id number]  Study Date: 5/8/2019  Location: [address] [place], [place] [id number]: Ade Tabor RDCS  Study quality: Technically fair  Referring Physician: Sathish Proctor MD  Blood Pressure: 142/73 mmHg  Height: 183 cm  Weight: 97 kg  BSA: 2.2 m2  ------------------------------------------------------------------------  PROCEDURE: Transthoracic echocardiogram with 2-D, M-Mode  and complete spectral and color flow Doppler.  INDICATION: Abnormal electrocardiogram (ECG) (EKG) (R94.31)  ------------------------------------------------------------------------  Dimensions:    Normal Values:  LA:     4.5    2.0 - 4.0 cm  Ao:     3.5    2.0 - 3.8 cm  SEPTUM: 0.9    0.6 - 1.2 cm  PWT:1.1    0.6 - 1.1 cm  LVIDd:  5.5    3.0 - 5.6 cm  LVIDs:         1.8 - 4.0 cm  Derived variables:  LVMI: 97 g/m2  RWT: 0.40  EF (Visual Estimate): 45 %  ------------------------------------------------------------------------  Observations:  Mitral Valve: Normal appearing mitral valve leaflets.  Mitral annular calcification.  Eccentric mitral regurgitation directed posteriorly,  probably severe.  Aortic Valve/Aorta: Mildly calcified aortic valve.  Normal aortic root size. (Ao: 3.5 cm at the sinuses of  Valsalva).  Left Atrium: Normal left atrium.  LA volume index = 28  cc/m2.  Left Ventricle: Estimated LV ejection fraction 45%.  Borderline left ventricular enlargement (EDV approximately  72 cc/m2).  Right Heart: Normal right atrium. Moderate right  ventricular enlargement. Study quality precludes accurate  assessment of right ventricular systolic function.  Normal  appearing tricuspid valve leaflets. At least moderate  tricuspid regurgitation. Normal pulmonic valve.  Pericardium/Pleura: Normal pericardium with trace  pericardial effusion.  Left pleural effusion.  Hemodynamic: Estimated right atrial pressure 15 mm Hg,  Severe pulmonary hypertension. Estimated PASP = 75 mmHg.  ------------------------------------------------------------------------  Conclusions:  Suboptimal apical windows.  Estimated LV ejection fraction 45%.  Eccentric mitral regurgitation directed posteriorly,  probably severe.  Moderate right ventricular enlargement. Study quality  precludes accurate assessment of right ventricular systolic  function.  Severe pulmonary hypertension. Estimated PASP = 75 mmHg.  ------------------------------------------------------------------------  Confirmed on  5/8/2019 - 18:04:38 by Juan Alberto Garcia M.D.  ------------------------------------------------------------------------    < end of copied text >    < from: Cardiac Cath Lab - Adult (05.10.19 @ 16:00) >  CORONARY VESSELS: The coronary circulation is right dominant.  LM:   --  LM: Normal.  LAD:   --  LAD: Angiography showed minor luminal irregularities with no  flow limiting lesions.  --  D1: There was a 70 % stenosis in the middle third of the vessel  segment.  CX:   --  Proximal circumflex: There was a 30 % stenosis.  --  Distal circumflex: There was a 60 % stenosis. There was a small  vascular territory distal to the lesion.  RCA:   --  Proximal RCA: There was a 40 % stenosis.  COMPLICATIONS: There were no complications.  DIAGNOSTIC IMPRESSIONS: Patient has severe pulmonary hypertension wih large  cv wave and mild CAD disease as descibed  DIAGNOSTIC RECOMMENDATIONS: surgical/ structiral heart team evaluation of  MR and evalution of worsening mental status    < end of copied text >

## 2019-09-26 NOTE — CONSULT NOTE ADULT - ASSESSMENT
69 M with PMH of HTN, DM2, HFrEF with EF ~45% (5/2019), complete heart block s/p Micra pacemaker 5/2019, non-Hodgkin lymphoma s/p chemo in 2014 (RCHOP/BR, currently in remission), CKD stage II who p/w SOB for last few days and  lower extremity edema. Patient was seen in May 2019 for AMS, newly diagnosed dementia, found to have CHB s/p micra placement. He was also found to have new HF at that time with severe MR and dilated annulus. He was diuresed significantly and discharged for outpatient mitraclip eval. However, pt did not follow up. He ran out of his medications approximately 2 months ago and has not taken any of his cardiac meds since. EP consulted for chronic RV pacing consideration for device upgrade to CRT-P.    #Chronic RV pacing  -s/p Micra pacemaker interrogation 9/26/10: Vpaced 86.5% with underlying rhythm Aflutter with CHB (see interrogation note for official report)  -Patient may benefit from upgrade to CRT-P, discussed with patient and daughter who are considering but agreeable      #Acute on Chronic Systolic Heart Failure  -Currently on IV lasix   -Continue on lisinopril, hydralazine  -Likely CRT-P once euvolemic      #New onset Atrial Flutter  -[id number] Score 4, recommend starting Apixaban BID  -If PPM Monday, hold Apixaban Sunday evening 9/29/19      Louis/Zo NP  [id number] 69 M with PMH of HTN, DM2, HFrEF with EF ~45% (5/2019), complete heart block s/p Micra pacemaker 5/2019, non-Hodgkin lymphoma s/p chemo in 2014 (RCHOP/BR, currently in remission), CKD stage II who p/w SOB for last few days and  lower extremity edema. Patient was seen in May 2019 for AMS, newly diagnosed dementia, found to have CHB s/p micra placement. He was also found to have new HF at that time with severe MR and dilated annulus. He was diuresed significantly and discharged for outpatient mitraclip eval. However, pt did not follow up. He ran out of his medications approximately 2 months ago and has not taken any of his cardiac meds since. EP consulted for chronic RV pacing consideration for device upgrade to CRT-P.    #Chronic RV pacing  -s/p Micra pacemaker interrogation 9/26/10: Vpaced 86.5% with underlying rhythm Aflutter with CHB (see interrogation note for official report)  -Patient may benefit from upgrade to CRT-P, discussed with patient and daughter who are considering but agreeable      #Acute on Chronic Systolic Heart Failure  -Currently on IV lasix   -Continue on lisinopril, hydralazine  -Likely CRT-P once euvolemic  -Follow up repeat Echo to assess LV function and Mitral Regurgitation    #New onset Atrial Flutter  -[id number] Score 4, recommend starting Apixaban BID  -If PPM Monday, hold Apixaban Johnnie evening 9/29/19      Louis/Zo NP  [id number]

## 2019-09-26 NOTE — PROCEDURE NOTE - ADDITIONAL PROCEDURE DETAILS
Interrogation Indication: Assess Vpacing %  1. Normal device function with pacing/sensing thresholds and impedances WNL  2. Battery longevity is 8 years remaining  3. Patient is not pacemaker dependent with underlying atrial flutter with CHB and ventricular escape in the 40's  4. Changes made: None    [id number]

## 2019-09-27 LAB
ANION GAP SERPL CALC-SCNC: 13 MMOL/L — SIGNIFICANT CHANGE UP (ref 5–17)
ANION GAP SERPL CALC-SCNC: 13 MMOL/L — SIGNIFICANT CHANGE UP (ref 5–17)
BUN SERPL-MCNC: 21 MG/DL — SIGNIFICANT CHANGE UP (ref 7–23)
BUN SERPL-MCNC: 25 MG/DL — HIGH (ref 7–23)
CALCIUM SERPL-MCNC: 8.9 MG/DL — SIGNIFICANT CHANGE UP (ref 8.4–10.5)
CALCIUM SERPL-MCNC: 9.6 MG/DL — SIGNIFICANT CHANGE UP (ref 8.4–10.5)
CHLORIDE SERPL-SCNC: 100 MMOL/L — SIGNIFICANT CHANGE UP (ref 96–108)
CHLORIDE SERPL-SCNC: 101 MMOL/L — SIGNIFICANT CHANGE UP (ref 96–108)
CO2 SERPL-SCNC: 28 MMOL/L — SIGNIFICANT CHANGE UP (ref 22–31)
CO2 SERPL-SCNC: 31 MMOL/L — SIGNIFICANT CHANGE UP (ref 22–31)
CREAT SERPL-MCNC: 1.51 MG/DL — HIGH (ref 0.5–1.3)
CREAT SERPL-MCNC: 1.56 MG/DL — HIGH (ref 0.5–1.3)
GLUCOSE BLDC GLUCOMTR-MCNC: 106 MG/DL — HIGH (ref 70–99)
GLUCOSE BLDC GLUCOMTR-MCNC: 145 MG/DL — HIGH (ref 70–99)
GLUCOSE BLDC GLUCOMTR-MCNC: 83 MG/DL — SIGNIFICANT CHANGE UP (ref 70–99)
GLUCOSE BLDC GLUCOMTR-MCNC: 88 MG/DL — SIGNIFICANT CHANGE UP (ref 70–99)
GLUCOSE SERPL-MCNC: 83 MG/DL — SIGNIFICANT CHANGE UP (ref 70–99)
GLUCOSE SERPL-MCNC: 99 MG/DL — SIGNIFICANT CHANGE UP (ref 70–99)
MAGNESIUM SERPL-MCNC: 1.8 MG/DL — SIGNIFICANT CHANGE UP (ref 1.6–2.6)
MAGNESIUM SERPL-MCNC: 2 MG/DL — SIGNIFICANT CHANGE UP (ref 1.6–2.6)
POTASSIUM SERPL-MCNC: 3.4 MMOL/L — LOW (ref 3.5–5.3)
POTASSIUM SERPL-MCNC: 4.3 MMOL/L — SIGNIFICANT CHANGE UP (ref 3.5–5.3)
POTASSIUM SERPL-SCNC: 3.4 MMOL/L — LOW (ref 3.5–5.3)
POTASSIUM SERPL-SCNC: 4.3 MMOL/L — SIGNIFICANT CHANGE UP (ref 3.5–5.3)
SODIUM SERPL-SCNC: 142 MMOL/L — SIGNIFICANT CHANGE UP (ref 135–145)
SODIUM SERPL-SCNC: 144 MMOL/L — SIGNIFICANT CHANGE UP (ref 135–145)

## 2019-09-27 PROCEDURE — 93306 TTE W/DOPPLER COMPLETE: CPT | Mod: 26

## 2019-09-27 PROCEDURE — 99233 SBSQ HOSP IP/OBS HIGH 50: CPT

## 2019-09-27 PROCEDURE — 99233 SBSQ HOSP IP/OBS HIGH 50: CPT | Mod: GC

## 2019-09-27 PROCEDURE — 93010 ELECTROCARDIOGRAM REPORT: CPT

## 2019-09-27 RX ORDER — POTASSIUM CHLORIDE 20 MEQ
40 PACKET (EA) ORAL ONCE
Refills: 0 | Status: COMPLETED | OUTPATIENT
Start: 2019-09-27 | End: 2019-09-27

## 2019-09-27 RX ORDER — ISOSORBIDE DINITRATE 5 MG/1
40 TABLET ORAL THREE TIMES A DAY
Refills: 0 | Status: DISCONTINUED | OUTPATIENT
Start: 2019-09-27 | End: 2019-10-02

## 2019-09-27 RX ORDER — MAGNESIUM OXIDE 400 MG ORAL TABLET 241.3 MG
400 TABLET ORAL ONCE
Refills: 0 | Status: COMPLETED | OUTPATIENT
Start: 2019-09-27 | End: 2019-09-27

## 2019-09-27 RX ORDER — ASPIRIN/CALCIUM CARB/MAGNESIUM 324 MG
81 TABLET ORAL DAILY
Refills: 0 | Status: DISCONTINUED | OUTPATIENT
Start: 2019-09-27 | End: 2019-10-02

## 2019-09-27 RX ORDER — LISINOPRIL 2.5 MG/1
20 TABLET ORAL DAILY
Refills: 0 | Status: DISCONTINUED | OUTPATIENT
Start: 2019-09-27 | End: 2019-09-28

## 2019-09-27 RX ORDER — ATORVASTATIN CALCIUM 80 MG/1
40 TABLET, FILM COATED ORAL AT BEDTIME
Refills: 0 | Status: DISCONTINUED | OUTPATIENT
Start: 2019-09-27 | End: 2019-10-02

## 2019-09-27 RX ADMIN — ISOSORBIDE DINITRATE 20 MILLIGRAM(S): 5 TABLET ORAL at 06:15

## 2019-09-27 RX ADMIN — Medication 80 MILLIGRAM(S): at 17:06

## 2019-09-27 RX ADMIN — Medication 80 MILLIGRAM(S): at 06:14

## 2019-09-27 RX ADMIN — ATORVASTATIN CALCIUM 40 MILLIGRAM(S): 80 TABLET, FILM COATED ORAL at 22:27

## 2019-09-27 RX ADMIN — Medication 81 MILLIGRAM(S): at 17:09

## 2019-09-27 RX ADMIN — ISOSORBIDE DINITRATE 40 MILLIGRAM(S): 5 TABLET ORAL at 22:31

## 2019-09-27 RX ADMIN — Medication 75 MILLIGRAM(S): at 06:15

## 2019-09-27 RX ADMIN — ISOSORBIDE DINITRATE 20 MILLIGRAM(S): 5 TABLET ORAL at 13:43

## 2019-09-27 RX ADMIN — Medication 75 MILLIGRAM(S): at 13:43

## 2019-09-27 RX ADMIN — Medication 40 MILLIEQUIVALENT(S): at 11:02

## 2019-09-27 RX ADMIN — LISINOPRIL 10 MILLIGRAM(S): 2.5 TABLET ORAL at 13:43

## 2019-09-27 RX ADMIN — MAGNESIUM OXIDE 400 MG ORAL TABLET 400 MILLIGRAM(S): 241.3 TABLET ORAL at 11:02

## 2019-09-27 RX ADMIN — APIXABAN 5 MILLIGRAM(S): 2.5 TABLET, FILM COATED ORAL at 08:56

## 2019-09-27 RX ADMIN — APIXABAN 5 MILLIGRAM(S): 2.5 TABLET, FILM COATED ORAL at 20:44

## 2019-09-27 RX ADMIN — Medication 75 MILLIGRAM(S): at 22:27

## 2019-09-27 NOTE — CONSULT NOTE ADULT - SUBJECTIVE AND OBJECTIVE BOX
Admitting Diagnosis:  Heart failure (I50.9): HEART FAILURE, UNSPECIFIED      HPI:  This is a 69y year old Male with the below past medical history who presents with the chief complaint of    ****CHART HPI:  HPI:  69 M with PMH of HTN, T2DM, HFrEF with EF ~45%, complete heart block s/p PPM, non-Hodgkin lymphoma s/p chemo in 2004 (currently in remission), CKD stage II who p/w dyspnea x 3 days. Patient has been off all of his medications since July d/t lack of PCP. He initially developed dyspnea and ARGUETA a week ago. Presented to the ED on 9/18, given 80mg Lasix, then sent home. Reports that his symptoms improved mildly then returned. He has been noting dyspnea along withe worsening LE edema x 3 days. Endorses to unable to sleep at night, with associated dyspnea on exertion as well. Denies fever, chill, CP, n/v/c/d, urinary concerns. No recent travels, no O2 needs at home, and no sick contacts. History of heart failure, only admission for CHF exacerbation was in May of 2019, where he was also found to have complete heart block 2/2 pacemaker placement.   ED vitals: /93, saturating 95% on RA. Labs with elevated Cr- (at baseline), BNP 41814, trop of 45. CXR without pleural effusion or consolidations. s/p 40mg IVP lasix.  At the time of my examination, patient denies any acute concerns. Has not urinated since the Lasix yet. Has not taken any medications as he ran out on all of them since July and could not establish care with a new PCP. Used to take spironolactone, Laxis, hydralazine, Isordil, and carvedilol for heart failure. Weight after discharge in May 280s, currently about 310lbs. (25 Sep 2019 14:24)  ******    Past Medical History:  Pleural effusion (J90)  Moderate mitral regurgitation (I34.0)  Systolic heart failure (I50.20)  Rhinitis, allergic (J30.9)  Essential hypertension (I10)  DM type 2 (diabetes mellitus, type 2) (E11.9): Never on insulin  Diabetes Mellitus (250.00)  Non-Hodgkins Lymphoma (202.80): [place] 10 [place]      Past Surgical History:  Cardiac pacemaker (Z95.0)  Non-Hodgkin lymphoma (C85.90): h/o axillary dissection  No significant past surgical history ([id number])      Social History:  No toxic habits    Family History:  FAMILY HISTORY:  Family history of non-Hodgkin's lymphoma (Sibling)  Family history of CHF (congestive heart failure): Mother      Allergies:  No Known Allergies      ROS:  Constitutional: Patient offers no complaints of fevers or significant weight loss  Ears, Nose, Mouth and Throat: The patient presents with no abnormalities of the head, ears, eyes, nose or throat  Skin: Patient offers no concerns of new rashes or lesions  Respiratory: The patient presents with no abnormalities of the respiratory tract  Cardiovascular: The patient presents with no cardiac abnormalities  Gastrointestinal: The patient presents with no abnormalities of the GI system  Genitourinary: The patient presents with no dysuria, hematuria or frequent urination  Neurological: See HPI  Endocrine: Patient offers no complaints of excessive thirst, urination, or heat/cold intolerance    Advanced care planning reviewed and noted in the chart.    Medications:  apixaban 5 milliGRAM(s) Oral every 12 hours  dextrose 40% Gel 15 Gram(s) Oral once PRN  dextrose 5%. 1000 milliLiter(s) IV Continuous <Continuous>  dextrose 50% Injectable 12.5 Gram(s) IV Push once  dextrose 50% Injectable 25 Gram(s) IV Push once  dextrose 50% Injectable 25 Gram(s) IV Push once  furosemide   Injectable 80 milliGRAM(s) IV Push two times a day  glucagon  Injectable 1 milliGRAM(s) IntraMuscular once PRN  hydrALAZINE 75 milliGRAM(s) Oral three times a day  influenza   Vaccine 0.5 milliLiter(s) IntraMuscular once  insulin lispro (HumaLOG) corrective regimen sliding scale   SubCutaneous Before meals and at bedtime  isosorbide   dinitrate Tablet (ISORDIL) 20 milliGRAM(s) Oral three times a day  lisinopril 10 milliGRAM(s) Oral daily  magnesium oxide 400 milliGRAM(s) Oral once  potassium chloride    Tablet ER 40 milliEquivalent(s) Oral once      Labs:  CBC Full  -  ( 26 Sep 2019 07:09 )  WBC Count : 6.8 K/uL  RBC Count : 4.32 M/uL  Hemoglobin : 11.6 g/dL  Hematocrit : 38.6 %  Platelet Count - Automated : 183 K/uL  Mean Cell Volume : 89.4 fl  Mean Cell Hemoglobin : 26.8 pg  Mean Cell Hemoglobin Concentration : 30.0 gm/dL  Auto Neutrophil # : 5.2 K/uL  Auto Lymphocyte # : 0.8 K/uL  Auto Monocyte # : 0.5 K/uL  Auto Eosinophil # : 0.2 K/uL  Auto Basophil # : 0.0 K/uL  Auto Neutrophil % : 76.8 %  Auto Lymphocyte % : 11.7 %  Auto Monocyte % : 8.0 %  Auto Eosinophil % : 3.1 %  Auto Basophil % : 0.3 %    09-27    142  |  101  |  21  ----------------------------<  83  3.4<L>   |  28  |  1.51<H>    Ca    8.9      27 Sep 2019 06:52  Phos  3.5     09-26  Mg     1.8     09-27    TPro  7.3  /  Alb  3.5  /  TBili  1.0  /  DBili  x   /  AST  14  /  ALT  5<L>  /  AlkPhos  147<H>  09-26    CAPILLARY BLOOD GLUCOSE      POCT Blood Glucose.: 83 mg/dL (27 Sep 2019 08:52)  POCT Blood Glucose.: 113 mg/dL (26 Sep 2019 21:50)  POCT Blood Glucose.: 109 mg/dL (26 Sep 2019 17:55)  POCT Blood Glucose.: 95 mg/dL (26 Sep 2019 12:57)    LIVER FUNCTIONS - ( 26 Sep 2019 08:33 )  Alb: x     / Pro: x     / ALK PHOS: x     / ALT: x     / AST: x     / GGT: 65 U/L       PT/INR - ( 25 Sep 2019 11:40 )   PT: 17.0 sec;   INR: 1.47 ratio         PTT - ( 25 Sep 2019 11:40 )  PTT:31.8 sec    Male    Vitals:  Vital Signs Last 24 Hrs  T(C): 36.6 (27 Sep 2019 04:33), Max: 36.7 (26 Sep 2019 20:56)  T(F): 97.8 (27 Sep 2019 04:33), Max: 98 (26 Sep 2019 20:56)  HR: 61 (27 Sep 2019 06:12) (58 - 61)  BP: 151/74 (27 Sep 2019 06:12) (144/70 - 151/74)  BP(mean): --  RR: 17 (27 Sep 2019 04:33) (17 - 18)  SpO2: 94% (27 Sep 2019 04:33) (94% - 98%)    NEUROLOGICAL EXAM:    Mental status: Awake, alert, and in no apparent distress. Oriented to person. Was able to tell me we were in a hospital but could not say which one. Believed it was September, 2019 however believe the president was Dudley Wilson. Language function is normal. Recent memory, digit span and concentration were normal.     Cranial Nerves: Pupils were equal, round, reactive to light. Extraocular movements were intact. Visual field were full. Fundoscopic exam was deferred. Facial sensation was intact to light touch. There was no facial asymmetry. The palate was upgoing symmetrically and tongue was midline. Hearing acuity was intact to finger rub AU. Shoulder shrug was full bilaterally    Motor exam: Bulk and tone were normal. Strength was 5/5 in all four extremities. Fine finger movements were symmetric and normal. There was no pronator drift    Reflexes: 2+ in the bilateral upper extremities. 2+ in the bilateral lower extremities. Toes were downgoing bilaterally.     Sensation: Intact to light touch, temperature, vibration and proprioception.     Coordination: Finger-nose-finger and heel-to-shin was without dysmetria.     Gait: Narrow base and steady. Romberg was negative. Admitting Diagnosis:  Heart failure (I50.9): HEART FAILURE, UNSPECIFIED      HPI:  This is a 69y year old Male with the below past medical history who presents with the chief complaint of shortness of breath, consult for confusion.  He has a hx of HTN, T2DM, HFrEF with EF ~45%, complete heart block s/p PPM, non-Hodgkin lymphoma s/p chemo in 2004 (currently in remission), CKD stage II who p/w dyspnea x 3 days. Patient has been off all of his medications since July d/t lack of PCP. He initially developed dyspnea and ARGUETA a week ago. Presented to the ED on 9/18, given 80mg Lasix, then sent home. Reports that his symptoms improved mildly then returned. He has been noting dyspnea along withe worsening LE edema x 3 days. Endorses to unable to sleep at night, with associated dyspnea on exertion as well.     Pt has been noted to have confusion and forgetfulness by heart failure team and family.  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  At that time he showed poor orientation and insight.  advised to have outpt neurocog eval.   Pt denies any issues at this time.    Past Medical History:  Pleural effusion (J90)  Moderate mitral regurgitation (I34.0)  Systolic heart failure (I50.20)  Rhinitis, allergic (J30.9)  Essential hypertension (I10)  DM type 2 (diabetes mellitus, type 2) (E11.9): Never on insulin  Diabetes Mellitus (250.00)  Non-Hodgkins Lymphoma (202.80): In [facility] for &gt; 10 years      Past Surgical History:  Cardiac pacemaker (Z95.0)  Non-Hodgkin lymphoma (C85.90): h/o axillary dissection  No significant past surgical history ([id number])      Social History:  No toxic habits    Family History:  FAMILY HISTORY:  Family history of non-Hodgkin's lymphoma (Sibling)  Family history of CHF (congestive heart failure): Mother      Allergies:  No Known Allergies      ROS:  Constitutional: Patient offers no complaints of fevers or significant weight loss  Ears, Nose, Mouth and Throat: The patient presents with no abnormalities of the head, ears, eyes, nose or throat  Skin: Patient offers no concerns of new rashes or lesions  Respiratory: The patient presents with no abnormalities of the respiratory tract  Cardiovascular: The patient presents with no cardiac abnormalities  Gastrointestinal: The patient presents with no abnormalities of the GI system  Genitourinary: The patient presents with no dysuria, hematuria or frequent urination  Neurological: See HPI  Endocrine: Patient offers no complaints of excessive thirst, urination, or heat/cold intolerance    Advanced care planning reviewed and noted in the chart.    Medications:  apixaban 5 milliGRAM(s) Oral every 12 hours  dextrose 40% Gel 15 Gram(s) Oral once PRN  dextrose 5%. 1000 milliLiter(s) IV Continuous <Continuous>  dextrose 50% Injectable 12.5 Gram(s) IV Push once  dextrose 50% Injectable 25 Gram(s) IV Push once  dextrose 50% Injectable 25 Gram(s) IV Push once  furosemide   Injectable 80 milliGRAM(s) IV Push two times a day  glucagon  Injectable 1 milliGRAM(s) IntraMuscular once PRN  hydrALAZINE 75 milliGRAM(s) Oral three times a day  influenza   Vaccine 0.5 milliLiter(s) IntraMuscular once  insulin lispro (HumaLOG) corrective regimen sliding scale   SubCutaneous Before meals and at bedtime  isosorbide   dinitrate Tablet (ISORDIL) 20 milliGRAM(s) Oral three times a day  lisinopril 10 milliGRAM(s) Oral daily  magnesium oxide 400 milliGRAM(s) Oral once  potassium chloride    Tablet ER 40 milliEquivalent(s) Oral once      Labs:  CBC Full  -  ( 26 Sep 2019 07:09 )  WBC Count : 6.8 K/uL  RBC Count : 4.32 M/uL  Hemoglobin : 11.6 g/dL  Hematocrit : 38.6 %  Platelet Count - Automated : 183 K/uL  Mean Cell Volume : 89.4 fl  Mean Cell Hemoglobin : 26.8 pg  Mean Cell Hemoglobin Concentration : 30.0 gm/dL  Auto Neutrophil # : 5.2 K/uL  Auto Lymphocyte # : 0.8 K/uL  Auto Monocyte # : 0.5 K/uL  Auto Eosinophil # : 0.2 K/uL  Auto Basophil # : 0.0 K/uL  Auto Neutrophil % : 76.8 %  Auto Lymphocyte % : 11.7 %  Auto Monocyte % : 8.0 %  Auto Eosinophil % : 3.1 %  Auto Basophil % : 0.3 %    09-27    142  |  101  |  21  ----------------------------<  83  3.4<L>   |  28  |  1.51<H>    Ca    8.9      27 Sep 2019 06:52  Phos  3.5     09-26  Mg     1.8     09-27    TPro  7.3  /  Alb  3.5  /  TBili  1.0  /  DBili  x   /  AST  14  /  ALT  5<L>  /  AlkPhos  147<H>  09-26    CAPILLARY BLOOD GLUCOSE      POCT Blood Glucose.: 83 mg/dL (27 Sep 2019 08:52)  POCT Blood Glucose.: 113 mg/dL (26 Sep 2019 21:50)  POCT Blood Glucose.: 109 mg/dL (26 Sep 2019 17:55)  POCT Blood Glucose.: 95 mg/dL (26 Sep 2019 12:57)    LIVER FUNCTIONS - ( 26 Sep 2019 08:33 )  Alb: x     / Pro: x     / ALK PHOS: x     / ALT: x     / AST: x     / GGT: 65 U/L       PT/INR - ( 25 Sep 2019 11:40 )   PT: 17.0 sec;   INR: 1.47 ratio         PTT - ( 25 Sep 2019 11:40 )  PTT:31.8 sec    Male    Vitals:  Vital Signs Last 24 Hrs  T(C): 36.6 (27 Sep 2019 04:33), Max: 36.7 (26 Sep 2019 20:56)  T(F): 97.8 (27 Sep 2019 04:33), Max: 98 (26 Sep 2019 20:56)  HR: 61 (27 Sep 2019 06:12) (58 - 61)  BP: 151/74 (27 Sep 2019 06:12) (144/70 - 151/74)  BP(mean): --  RR: 17 (27 Sep 2019 04:33) (17 - 18)  SpO2: 94% (27 Sep 2019 04:33) (94% - 98%)    NEUROLOGICAL EXAM:    Mental status: Awake, alert, and in no apparent distress. Oriented to person. Was able to tell me we were in a hospital but could not say which one. Believed it was September, 2019 however believe the president was Dudley Wilson. Language function is normal. Recent memory, digit span and concentration were normal.     Cranial Nerves: Pupils were equal, round, reactive to light. Extraocular movements were intact. Visual field were full. Fundoscopic exam was deferred. Facial sensation was intact to light touch. There was no facial asymmetry. The palate was upgoing symmetrically and tongue was midline. Hearing acuity was intact to finger rub AU. Shoulder shrug was full bilaterally    Motor exam: Bulk and tone were normal. Strength was 5/5 in all four extremities. Fine finger movements were symmetric and normal. There was no pronator drift    Reflexes: 2+ in the bilateral upper extremities. 2+ in the bilateral lower extremities. Toes were downgoing bilaterally.     Sensation: Intact to light touch, temperature, vibration and proprioception.     Coordination: Finger-nose-finger and heel-to-shin was without dysmetria.     Gait: deferred

## 2019-09-27 NOTE — CONSULT NOTE ADULT - ASSESSMENT
69 M with PMH of HTN, T2DM, HFrEF with EF ~45%, complete heart block s/p PPM, non-Hodgkin lymphoma s/p chemo in 2004 (currently in remission), CKD stage II who p/w dyspnea x 3 days found to have HF exacerbation. Neuro consulted for confusion. There may be an element of underlying dementia. The patient is pleasant, cooperative, and was able to tell me why he is in the hospital. No evidence of acute neurologic pathology. Patient would likely benefit from dementia testing as an outpatient.       *****ATTENDING RECOMMENDATIONS TO FOLLOW IN ATTESTATION BELOW

## 2019-09-27 NOTE — CONSULT NOTE ADULT - ATTENDING COMMENTS
69 M with PMH of HTN, T2DM, HFrEF with EF ~45%, complete heart block s/p PPM, non-Hodgkin lymphoma s/p chemo in 2004 (currently in remission), CKD stage II who p/w dyspnea x 3 days found to have HF exacerbation. Neuro consulted for confusion.  Evaluation similar to exam notated by neurology in May of 2019.  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    Pt does have cognitive impairment, suspect multifactorial with overall poor cardiac health, possible contribution of old infarct, poor sleep, and possibly a degenerative component.  Neuro eval seems stable since prior eval in May.    - no need for inpatient tests on this admission  - card given.  more appropriate to do cognitive evaluation in outpt setting  - no neuro objection to dc soon

## 2019-09-27 NOTE — PROGRESS NOTE ADULT - ASSESSMENT
69 M with PMH of HTN, DM2, HFrEF with EF ~45% (5/2019), complete heart block s/p Micra pacemaker 5/2019, non-Hodgkin lymphoma s/p chemo in 2014 (RCHOP/BR, currently in remission), CKD stage II who p/w SOB for last few days and  lower extremity edema. Patient was seen in May 2019 for AMS, newly diagnosed dementia, found to have CHB s/p micra placement. He was also found to have new HF at that time with severe MR and dilated annulus. He was diuresed significantly and discharged for outpatient mitraclip eval. However, pt did not follow up. He ran out of his medications approximately 2 months ago and has not taken any of his cardiac meds since. EP consulted for chronic RV pacing consideration for device upgrade to CRT-P.    #Chronic RV pacing  -s/p Micra pacemaker interrogation 9/26/10: Vpaced 86.5% with underlying rhythm Aflutter with CHB (see interrogation note for official report)  -Patient may benefit from upgrade to CRT-P, discussed with patient and daughter who are considering but agreeable      #Acute on Chronic Systolic Heart Failure  -Currently on IV lasix   -Continue on lisinopril, hydralazine  -Likely CRT-P once euvolemic  -Follow up repeat Echo to assess LV function and Mitral Regurgitation    #New onset Atrial Flutter  -[id number] Score 4, continue Apixaban 5mg BID  -If PPM Monday, hold Apixaban Sunday evening 9/29/19    #Altered Mental Status  -Unclear of new diagnosis of dementia, no neuro follow up outpatient  -Would consider Neuro evaluation  -Discussed with Medicine HAMZAH Myers/Zo GOODSON  [phone]

## 2019-09-27 NOTE — PROVIDER CONTACT NOTE (OTHER) - ASSESSMENT
Patient A&O x 4, Denies chest pain, SOB, dizziness; asymptomatic. VS: /81, HR 61, Temp 98.1, RR 18, O2 95%.

## 2019-09-27 NOTE — PROGRESS NOTE ADULT - SUBJECTIVE AND OBJECTIVE BOX
Patient is a 69y old  Male who presents with a chief complaint of SOB, LE edema (27 Sep 2019 12:28)        SUBJECTIVE / OVERNIGHT EVENTS: no acute complaints      MEDICATIONS  (STANDING):  apixaban 5 milliGRAM(s) Oral every 12 hours  dextrose 5%. 1000 milliLiter(s) (50 mL/Hr) IV Continuous <Continuous>  dextrose 50% Injectable 12.5 Gram(s) IV Push once  dextrose 50% Injectable 25 Gram(s) IV Push once  dextrose 50% Injectable 25 Gram(s) IV Push once  furosemide   Injectable 80 milliGRAM(s) IV Push two times a day  hydrALAZINE 75 milliGRAM(s) Oral three times a day  influenza   Vaccine 0.5 milliLiter(s) IntraMuscular once  insulin lispro (HumaLOG) corrective regimen sliding scale   SubCutaneous Before meals and at bedtime  isosorbide   dinitrate Tablet (ISORDIL) 20 milliGRAM(s) Oral three times a day  lisinopril 10 milliGRAM(s) Oral daily    MEDICATIONS  (PRN):  dextrose 40% Gel 15 Gram(s) Oral once PRN Blood Glucose LESS THAN 70 milliGRAM(s)/deciliter  glucagon  Injectable 1 milliGRAM(s) IntraMuscular once PRN Glucose LESS THAN 70 milligrams/deciliter      Vital Signs Last 24 Hrs  T(C): 36.4 (27 Sep 2019 13:42), Max: 36.7 (26 Sep 2019 20:56)  T(F): 97.5 (27 Sep 2019 13:42), Max: 98 (26 Sep 2019 20:56)  HR: 62 (27 Sep 2019 13:42) (58 - 62)  BP: 153/75 (27 Sep 2019 13:42) (144/70 - 153/75)  BP(mean): --  RR: 18 (27 Sep 2019 13:42) (17 - 18)  SpO2: 95% (27 Sep 2019 13:42) (94% - 98%)  CAPILLARY BLOOD GLUCOSE      POCT Blood Glucose.: 106 mg/dL (27 Sep 2019 13:04)  POCT Blood Glucose.: 83 mg/dL (27 Sep 2019 08:52)  POCT Blood Glucose.: 113 mg/dL (26 Sep 2019 21:50)  POCT Blood Glucose.: 109 mg/dL (26 Sep 2019 17:55)    I&O's Summary    26 Sep 2019 07:01  -  27 Sep 2019 07:00  --------------------------------------------------------  IN: 420 mL / OUT: 0 mL / NET: 420 mL    27 Sep 2019 07:01  -  27 Sep 2019 16:02  --------------------------------------------------------  IN: 660 mL / OUT: 0 mL / NET: 660 mL        PHYSICAL EXAM:  GENERAL: NAD, breathing normal  EYES: conjunctiva and sclera clear  NECK: supple, + JVD  CHEST/LUNG: decreased bs at bases, +crackles   HEART: S1 S2 RRR  ABDOMEN: +BS Soft, NT/ND  EXTREMITIES:  2+ DP Pulses, No c/c. 2+b/l LE edema up to thighs  NEUROLOGY: AAOx3, no facial droop, no focal deficits   SKIN: No rashes or lesions      LABS:                        11.6   6.8   )-----------( 183      ( 26 Sep 2019 07:09 )             38.6     09-27    142  |  101  |  21  ----------------------------<  83  3.4<L>   |  28  |  1.51<H>    Ca    8.9      27 Sep 2019 06:52  Phos  3.5     09-26  Mg     1.8     09-27    TPro  7.3  /  Alb  3.5  /  TBili  1.0  /  DBili  x   /  AST  14  /  ALT  5<L>  /  AlkPhos  147<H>  09-26              RADIOLOGY & ADDITIONAL TESTS:    Imaging Personally Reviewed:  Consultant(s) Notes Reviewed:    Care Discussed with Consultants/Other Providers:

## 2019-09-27 NOTE — PROGRESS NOTE ADULT - ASSESSMENT
ASSESSMENT/PLAN: 	  69 year old man with prior NHL (treated with R-CHOP 2004 and BR in 2010), chronic HFrEF (likely 2/2 Doxorubicin, EF:45%, decreased RVSF), CHB s/p micra PPM 5/2019, severe MR pending mitraclip evaluation, now p/w ADHF due to medication non-compliance. Continues to be grossly volume overloaded.    Problem/Plan - 1:  ·  Problem: Acute on chronic systolic CHF.  Plan:   -c/w lasix 80mg IV BID, goal net neg 1-2L  -c/w hydralazine to 75mg TID  -increase isordil to 40mg TID  -increase lisinopril to 20mg daily  -hold coreg in setting of ADHF  -Strict I/Os, daily standing weights.     Problem/Plan - 2:  ·  Problem: Severe mitral regurgitation.  Plan:   -c/w diuresis and afterload reduction as above  -will repeat TTE when euvolemic    Problem/Plan - 3:  ·  Problem: Stage 3 chronic kidney disease.  Plan: SCr slightly elevated today, monitor    Problem/Plan - 4:  ·  Problem: Aflutter with CHB s/p PPM.  Plan:   - c/w apixaban  - would benefit from CRT-P given high pacing requirements; may help with improving EF    Problem/Plan - 5:  ·  Problem: Non-obstructive CAD.  Plan:   - last cath 5/2019, asymptomatic  - start asa 81mg daily, atorvastatin 40mg daily      Sonya Bales MD  Cardiology Fellow   [phone], M-F 7:30A-5P    All Cardiology service information can be found on [email or web address], password: [email or web address].

## 2019-09-27 NOTE — PROGRESS NOTE ADULT - SUBJECTIVE AND OBJECTIVE BOX
24H hour events:   Patient sitting up in bed with no complaints. Denies chest pain, shortness of breath, dizziness, and palpitations.      MEDICATIONS:  apixaban 5 milliGRAM(s) Oral every 12 hours  furosemide   Injectable 80 milliGRAM(s) IV Push two times a day  hydrALAZINE 75 milliGRAM(s) Oral three times a day  isosorbide   dinitrate Tablet (ISORDIL) 20 milliGRAM(s) Oral three times a day  lisinopril 10 milliGRAM(s) Oral daily  dextrose 40% Gel 15 Gram(s) Oral once PRN  dextrose 50% Injectable 12.5 Gram(s) IV Push once  dextrose 50% Injectable 25 Gram(s) IV Push once  dextrose 50% Injectable 25 Gram(s) IV Push once  glucagon  Injectable 1 milliGRAM(s) IntraMuscular once PRN  insulin lispro (HumaLOG) corrective regimen sliding scale   SubCutaneous Before meals and at bedtime  dextrose 5%. 1000 milliLiter(s) IV Continuous <Continuous>  influenza   Vaccine 0.5 milliLiter(s) IntraMuscular once  magnesium oxide 400 milliGRAM(s) Oral once  potassium chloride    Tablet ER 40 milliEquivalent(s) Oral once      REVIEW OF SYSTEMS:  Complete 10point ROS negative.    PHYSICAL EXAM:  T(C): 36.6 (09-27-19 @ 04:33), Max: 36.7 (09-26-19 @ 20:56)  HR: 61 (09-27-19 @ 06:12) (58 - 61)  BP: 151/74 (09-27-19 @ 06:12) (144/70 - 151/74)  RR: 17 (09-27-19 @ 04:33) (17 - 18)  SpO2: 94% (09-27-19 @ 04:33) (94% - 98%)  Wt(kg): --  I&O's Summary    26 Sep 2019 07:01  -  27 Sep 2019 07:00  --------------------------------------------------------  IN: 420 mL / OUT: 0 mL / NET: 420 mL        Appearance: Normal	  Cardiovascular: Normal S1 S2, No JVD, No murmurs, No edema  Respiratory: Lungs clear to auscultation	  Psychiatry: A & O x 2, disoriented to time  Gastrointestinal:  Soft, Non-tender, + BS	  Skin: No rashes, No ecchymoses, No cyanosis	  Extremities: Normal range of motion, No clubbing, +2 pitting edema b/l lower ext  Vascular: Peripheral pulses palpable 2+ bilaterally        LABS:	 	    CBC Full  -  ( 26 Sep 2019 07:09 )  WBC Count : 6.8 K/uL  Hemoglobin : 11.6 g/dL  Hematocrit : 38.6 %  Platelet Count - Automated : 183 K/uL  Mean Cell Volume : 89.4 fl  Mean Cell Hemoglobin : 26.8 pg  Mean Cell Hemoglobin Concentration : 30.0 gm/dL  Auto Neutrophil # : 5.2 K/uL  Auto Lymphocyte # : 0.8 K/uL  Auto Monocyte # : 0.5 K/uL  Auto Eosinophil # : 0.2 K/uL  Auto Basophil # : 0.0 K/uL  Auto Neutrophil % : 76.8 %  Auto Lymphocyte % : 11.7 %  Auto Monocyte % : 8.0 %  Auto Eosinophil % : 3.1 %  Auto Basophil % : 0.3 %    09-27    142  |  101  |  21  ----------------------------<  83  3.4<L>   |  28  |  1.51<H>  09-26    143  |  105  |  19  ----------------------------<  94  3.8   |  25  |  1.50<H>    Ca    8.9      27 Sep 2019 06:52  Ca    8.7      26 Sep 2019 07:09  Phos  3.5     09-26  Mg     1.8     09-27  Mg     1.8     09-26    TPro  7.3  /  Alb  3.5  /  TBili  1.0  /  DBili  x   /  AST  14  /  ALT  5<L>  /  AlkPhos  147<H>  09-26  TPro  7.9  /  Alb  3.7  /  TBili  1.0  /  DBili  x   /  AST  17  /  ALT  9<L>  /  AlkPhos  170<H>  09-25      proBNP: Serum Pro-Brain Natriuretic Peptide: 02165 pg/mL (09-25 @ 11:40)    TELEMETRY: Atrial Flutter, Vpaced 60-70bpm with 8 beats WCT overnight, bigeminy, trigeminy, PVC 	    	  RADIOLOGY: Pending repeat TTE

## 2019-09-27 NOTE — PROGRESS NOTE ADULT - SUBJECTIVE AND OBJECTIVE BOX
Patient agreeable to staying at least through the weekend for further treatment. Denies CP or SOB.    MEDICATIONS:  apixaban 5 milliGRAM(s) Oral every 12 hours  furosemide   Injectable 80 milliGRAM(s) IV Push two times a day  hydrALAZINE 75 milliGRAM(s) Oral three times a day  isosorbide   dinitrate Tablet (ISORDIL) 20 milliGRAM(s) Oral three times a day  lisinopril 10 milliGRAM(s) Oral daily  dextrose 40% Gel 15 Gram(s) Oral once PRN  dextrose 50% Injectable 12.5 Gram(s) IV Push once  dextrose 50% Injectable 25 Gram(s) IV Push once  dextrose 50% Injectable 25 Gram(s) IV Push once  glucagon  Injectable 1 milliGRAM(s) IntraMuscular once PRN  insulin lispro (HumaLOG) corrective regimen sliding scale   SubCutaneous Before meals and at bedtime    dextrose 5%. 1000 milliLiter(s) IV Continuous <Continuous>  influenza   Vaccine 0.5 milliLiter(s) IntraMuscular once      REVIEW OF SYSTEMS:    CONSTITUTIONAL: No weakness, fevers or chills  EYES/ENT: No visual changes;  No dysphagia  RESPIRATORY: No cough, wheezing, hemoptysis; No shortness of breath  CARDIOVASCULAR: No chest pain or palpitations; No lower extremity edema  GASTROINTESTINAL: No abdominal or epigastric pain. No nausea, vomiting, or hematemesis  GENITOURINARY: No dysuria, frequency or hematuria  NEUROLOGICAL: No numbness or weakness  SKIN: No itching, burning, rashes, or lesions   HEME: No bleeding or bruising  MSK: No joint pains or muscle pains  All other review of systems is negative unless indicated above.    PHYSICAL EXAM:  T(C): 36.6 (09-27-19 @ 04:33), Max: 36.7 (09-26-19 @ 20:56)  HR: 61 (09-27-19 @ 06:12) (58 - 61)  BP: 151/74 (09-27-19 @ 06:12) (144/70 - 151/74)  RR: 17 (09-27-19 @ 04:33) (17 - 18)  SpO2: 94% (09-27-19 @ 04:33) (94% - 98%)  Wt(kg): --  I&O's Summary    26 Sep 2019 07:01  -  27 Sep 2019 07:00  --------------------------------------------------------  IN: 420 mL / OUT: 0 mL / NET: 420 mL    27 Sep 2019 07:01  -  27 Sep 2019 12:28  --------------------------------------------------------  IN: 300 mL / OUT: 0 mL / NET: 300 mL        Appearance: Normal	  HEENT:   Normal oral mucosa  Cardiovascular: Normal S1 S2, +JVD, No murmurs, 3+edema  Respiratory: Lungs clear to auscultation	  Psychiatry: A & O x 3, Mood & affect appropriate  Gastrointestinal:  Soft, Non-tender, + BS	  Skin: No rashes, No ecchymoses, No cyanosis	  Neurologic: Non-focal  Extremities: Normal range of motion, No clubbing, cyanosis        LABS:	 	    CBC Full  -  ( 26 Sep 2019 07:09 )  WBC Count : 6.8 K/uL  Hemoglobin : 11.6 g/dL  Hematocrit : 38.6 %  Platelet Count - Automated : 183 K/uL  Mean Cell Volume : 89.4 fl  Mean Cell Hemoglobin : 26.8 pg  Mean Cell Hemoglobin Concentration : 30.0 gm/dL  Auto Neutrophil # : 5.2 K/uL  Auto Lymphocyte # : 0.8 K/uL  Auto Monocyte # : 0.5 K/uL  Auto Eosinophil # : 0.2 K/uL  Auto Basophil # : 0.0 K/uL  Auto Neutrophil % : 76.8 %  Auto Lymphocyte % : 11.7 %  Auto Monocyte % : 8.0 %  Auto Eosinophil % : 3.1 %  Auto Basophil % : 0.3 %    09-27    142  |  101  |  21  ----------------------------<  83  3.4<L>   |  28  |  1.51<H>  09-26    143  |  105  |  19  ----------------------------<  94  3.8   |  25  |  1.50<H>    Ca    8.9      27 Sep 2019 06:52  Ca    8.7      26 Sep 2019 07:09  Phos  3.5     09-26  Mg     1.8     09-27  Mg     1.8     09-26    TPro  7.3  /  Alb  3.5  /  TBili  1.0  /  DBili  x   /  AST  14  /  ALT  5<L>  /  AlkPhos  147<H>  09-26      proBNP: Serum Pro-Brain Natriuretic Peptide: 86579 pg/mL (09-25-19 @ 11:40)

## 2019-09-27 NOTE — CHART NOTE - NSCHARTNOTEFT_GEN_A_CORE
Notified by RN of 8 beats of WCT.    Pt was sleeping, asymptomatic. Denies weakness, headaches, dizziness, syncope, chest pain, palpitations, SOB, dyspnea, abdominal pain, N/V/D.   VSS.   Current tele monitoring: Sinus ~  STAT EKG shows . No acute changes when compared with past EKGs.  F/u AM BMP, mag, phos  Discussed with nurse  Will continue to monitor  Will endorse to day team      Larisa Loaiza PA-C  #[id number] Notified by RN of 8 beats of WCT.    Pt was sleeping, asymptomatic. Denies weakness, headaches, dizziness, syncope, chest pain, palpitations, SOB, dyspnea, abdominal pain, N/V/D.   VSS.   Current tele monitoring: V-paced ~60s  STAT EKG shows . No acute changes when compared with past EKGs.  F/u AM BMP, mag, phos  Discussed with nurse  Will continue to monitor  Will endorse to day team      Larisa Loaiza PA-C  #[id number]

## 2019-09-28 LAB
ANION GAP SERPL CALC-SCNC: 11 MMOL/L — SIGNIFICANT CHANGE UP (ref 5–17)
ANION GAP SERPL CALC-SCNC: 13 MMOL/L — SIGNIFICANT CHANGE UP (ref 5–17)
BUN SERPL-MCNC: 23 MG/DL — SIGNIFICANT CHANGE UP (ref 7–23)
BUN SERPL-MCNC: 32 MG/DL — HIGH (ref 7–23)
CALCIUM SERPL-MCNC: 9 MG/DL — SIGNIFICANT CHANGE UP (ref 8.4–10.5)
CALCIUM SERPL-MCNC: 9.1 MG/DL — SIGNIFICANT CHANGE UP (ref 8.4–10.5)
CHLORIDE SERPL-SCNC: 98 MMOL/L — SIGNIFICANT CHANGE UP (ref 96–108)
CHLORIDE SERPL-SCNC: 99 MMOL/L — SIGNIFICANT CHANGE UP (ref 96–108)
CO2 SERPL-SCNC: 28 MMOL/L — SIGNIFICANT CHANGE UP (ref 22–31)
CO2 SERPL-SCNC: 29 MMOL/L — SIGNIFICANT CHANGE UP (ref 22–31)
CREAT SERPL-MCNC: 1.51 MG/DL — HIGH (ref 0.5–1.3)
CREAT SERPL-MCNC: 1.69 MG/DL — HIGH (ref 0.5–1.3)
GLUCOSE BLDC GLUCOMTR-MCNC: 111 MG/DL — HIGH (ref 70–99)
GLUCOSE BLDC GLUCOMTR-MCNC: 118 MG/DL — HIGH (ref 70–99)
GLUCOSE BLDC GLUCOMTR-MCNC: 132 MG/DL — HIGH (ref 70–99)
GLUCOSE BLDC GLUCOMTR-MCNC: 88 MG/DL — SIGNIFICANT CHANGE UP (ref 70–99)
GLUCOSE SERPL-MCNC: 133 MG/DL — HIGH (ref 70–99)
GLUCOSE SERPL-MCNC: 79 MG/DL — SIGNIFICANT CHANGE UP (ref 70–99)
MAGNESIUM SERPL-MCNC: 1.8 MG/DL — SIGNIFICANT CHANGE UP (ref 1.6–2.6)
MAGNESIUM SERPL-MCNC: 1.9 MG/DL — SIGNIFICANT CHANGE UP (ref 1.6–2.6)
POTASSIUM SERPL-MCNC: 3.5 MMOL/L — SIGNIFICANT CHANGE UP (ref 3.5–5.3)
POTASSIUM SERPL-MCNC: 4.3 MMOL/L — SIGNIFICANT CHANGE UP (ref 3.5–5.3)
POTASSIUM SERPL-SCNC: 3.5 MMOL/L — SIGNIFICANT CHANGE UP (ref 3.5–5.3)
POTASSIUM SERPL-SCNC: 4.3 MMOL/L — SIGNIFICANT CHANGE UP (ref 3.5–5.3)
SODIUM SERPL-SCNC: 139 MMOL/L — SIGNIFICANT CHANGE UP (ref 135–145)
SODIUM SERPL-SCNC: 139 MMOL/L — SIGNIFICANT CHANGE UP (ref 135–145)

## 2019-09-28 PROCEDURE — 99232 SBSQ HOSP IP/OBS MODERATE 35: CPT

## 2019-09-28 PROCEDURE — 99233 SBSQ HOSP IP/OBS HIGH 50: CPT

## 2019-09-28 RX ORDER — LISINOPRIL 2.5 MG/1
40 TABLET ORAL DAILY
Refills: 0 | Status: DISCONTINUED | OUTPATIENT
Start: 2019-09-28 | End: 2019-10-02

## 2019-09-28 RX ADMIN — ISOSORBIDE DINITRATE 40 MILLIGRAM(S): 5 TABLET ORAL at 21:13

## 2019-09-28 RX ADMIN — APIXABAN 5 MILLIGRAM(S): 2.5 TABLET, FILM COATED ORAL at 18:06

## 2019-09-28 RX ADMIN — APIXABAN 5 MILLIGRAM(S): 2.5 TABLET, FILM COATED ORAL at 06:09

## 2019-09-28 RX ADMIN — ATORVASTATIN CALCIUM 40 MILLIGRAM(S): 80 TABLET, FILM COATED ORAL at 21:13

## 2019-09-28 RX ADMIN — Medication 80 MILLIGRAM(S): at 06:09

## 2019-09-28 RX ADMIN — ISOSORBIDE DINITRATE 40 MILLIGRAM(S): 5 TABLET ORAL at 12:59

## 2019-09-28 RX ADMIN — Medication 75 MILLIGRAM(S): at 12:59

## 2019-09-28 RX ADMIN — Medication 75 MILLIGRAM(S): at 06:09

## 2019-09-28 RX ADMIN — ISOSORBIDE DINITRATE 40 MILLIGRAM(S): 5 TABLET ORAL at 06:10

## 2019-09-28 RX ADMIN — LISINOPRIL 20 MILLIGRAM(S): 2.5 TABLET ORAL at 06:11

## 2019-09-28 RX ADMIN — Medication 80 MILLIGRAM(S): at 18:06

## 2019-09-28 RX ADMIN — Medication 75 MILLIGRAM(S): at 21:14

## 2019-09-28 RX ADMIN — Medication 81 MILLIGRAM(S): at 12:58

## 2019-09-28 NOTE — PROGRESS NOTE ADULT - ASSESSMENT
68 YO M with a history of ACC/AHA Stage C HF with borderline EF (LV 5.5 cm, EF 45%) likely from hypertensive cardiomyopathy with severe functional MR, CHB s/p micra PPM 5/2019, and CKD III (baseline Cr 1.5), NHL with history of doxarubicin exposure presenting with acute decompensated heart failure. He was roughly 30 lbs above his last discharge weight and the trigger is not taking his medications for 3 months because he felt well and did not refill prescriptions. He was also found to have atrial flutter which is a new diagnosis. In addition to diuresis he has other avenues of improving his HF trajectory (CRT + restoring sinus rhythm and mitraclip evaluation if MR remains severe) however he is unlikely to be willing to stay long as he requires significant coaxing to remain in the hospital.     Review of pertinent studies reveals:  EKG on admission: atrial flutter, underlying RV pacing, PVCs   TTE 5/2019: LV 5.5 cm, EF 45%, severe functional MR, moderate TR  RHC 5/2019: RA 17, PA 75/36 (48), PCWP 27, [place] CO/CI 4.9/2.2  LHC 5/2019: non-obstructive CAD aside from borderline significant lesions in small vessels   KUSHAL 5/2019: dilated annulus with tethered leaflets and severe fuctional MR     The plan for today is as follows:  - Increase lisinopril to 40 mg daily starting tomorrow. Otherwise continue hydralizine 75 mg TID and will start carvedilol when closer to euvolemic  - Continue IV diuresis  - Anticoagulation for AFl  - Continue ASA/statin for CAD  - Would ultimately benefit from CRT upgrade with DCCV however he needs medical and volume optimization first and he states he is unlikely to stay long due to outstanding matters   - Would eventually consider MitraClip evaluation if MR remains severe with CRT pacing

## 2019-09-28 NOTE — PROGRESS NOTE ADULT - SUBJECTIVE AND OBJECTIVE BOX
Patient is a 69y old  Male who presents with a chief complaint of SOB, LE edema (28 Sep 2019 14:53)      SUBJECTIVE / OVERNIGHT EVENTS: sinus 50-70 on tele, feels better, wants to go home, denies cp, sob, palpitaitons, reports LE swelling is improved     MEDICATIONS  (STANDING):  apixaban 5 milliGRAM(s) Oral every 12 hours  aspirin enteric coated 81 milliGRAM(s) Oral daily  atorvastatin 40 milliGRAM(s) Oral at bedtime  dextrose 5%. 1000 milliLiter(s) (50 mL/Hr) IV Continuous <Continuous>  dextrose 50% Injectable 12.5 Gram(s) IV Push once  dextrose 50% Injectable 25 Gram(s) IV Push once  dextrose 50% Injectable 25 Gram(s) IV Push once  furosemide   Injectable 80 milliGRAM(s) IV Push two times a day  hydrALAZINE 75 milliGRAM(s) Oral three times a day  influenza   Vaccine 0.5 milliLiter(s) IntraMuscular once  insulin lispro (HumaLOG) corrective regimen sliding scale   SubCutaneous Before meals and at bedtime  isosorbide   dinitrate Tablet (ISORDIL) 40 milliGRAM(s) Oral three times a day  lisinopril 40 milliGRAM(s) Oral daily    MEDICATIONS  (PRN):  dextrose 40% Gel 15 Gram(s) Oral once PRN Blood Glucose LESS THAN 70 milliGRAM(s)/deciliter  glucagon  Injectable 1 milliGRAM(s) IntraMuscular once PRN Glucose LESS THAN 70 milligrams/deciliter        CAPILLARY BLOOD GLUCOSE      POCT Blood Glucose.: 111 mg/dL (28 Sep 2019 12:33)  POCT Blood Glucose.: 88 mg/dL (28 Sep 2019 07:35)  POCT Blood Glucose.: 145 mg/dL (27 Sep 2019 21:38)  POCT Blood Glucose.: 88 mg/dL (27 Sep 2019 17:33)    I&O's Summary    27 Sep 2019 07:01  -  28 Sep 2019 07:00  --------------------------------------------------------  IN: 900 mL / OUT: 0 mL / NET: 900 mL    28 Sep 2019 07:01  -  28 Sep 2019 17:22  --------------------------------------------------------  IN: 780 mL / OUT: 0 mL / NET: 780 mL        PHYSICAL EXAM:  GENERAL: NAD, well-developed  HEAD:  Atraumatic, Normocephalic  EYES: conjunctiva and sclera clear  CHEST/LUNG: decreased breath sounds b/l; No wheeze  HEART: Regular rate and rhythm; S1S2  ABDOMEN: Soft, Nontender, Nondistended; Bowel sounds present  EXTREMITIES:  +2LE edema b/l  PSYCH: AAOx3  NEUROLOGY: non-focal      LABS:    09-28    139  |  99  |  23  ----------------------------<  79  3.5   |  29  |  1.51<H>    Ca    9.0      28 Sep 2019 06:54  Mg     1.9     09-28                RADIOLOGY & ADDITIONAL TESTS:    Imaging Personally Reviewed:    Consultant(s) Notes Reviewed:  chf    Care Discussed with Consultants/Other Providers:

## 2019-09-28 NOTE — PROGRESS NOTE ADULT - SUBJECTIVE AND OBJECTIVE BOX
Subjective:  Feels better and willing to stay in hospital for diuresis    Medications:  apixaban 5 milliGRAM(s) Oral every 12 hours  aspirin enteric coated 81 milliGRAM(s) Oral daily  atorvastatin 40 milliGRAM(s) Oral at bedtime  dextrose 40% Gel 15 Gram(s) Oral once PRN  dextrose 5%. 1000 milliLiter(s) IV Continuous <Continuous>  dextrose 50% Injectable 12.5 Gram(s) IV Push once  dextrose 50% Injectable 25 Gram(s) IV Push once  dextrose 50% Injectable 25 Gram(s) IV Push once  furosemide   Injectable 80 milliGRAM(s) IV Push two times a day  glucagon  Injectable 1 milliGRAM(s) IntraMuscular once PRN  hydrALAZINE 75 milliGRAM(s) Oral three times a day  influenza   Vaccine 0.5 milliLiter(s) IntraMuscular once  insulin lispro (HumaLOG) corrective regimen sliding scale   SubCutaneous Before meals and at bedtime  isosorbide   dinitrate Tablet (ISORDIL) 40 milliGRAM(s) Oral three times a day  lisinopril 20 milliGRAM(s) Oral daily    Vitals:  T(C): 36.7 (19 @ 12:00), Max: 36.7 (19 @ 04:23)  HR: 59 (19 @ 12:00) (58 - 71)  BP: 138/64 (19 @ 12:00) (136/73 - 156/71)  BP(mean): --  RR: 18 (19 @ 12:00) (18 - 18)  SpO2: 96% (19 @ 12:00) (95% - 99%)    Daily     Daily Weight in k.2 (28 Sep 2019 04:23)        I&O's Summary    27 Sep 2019 07:  -  28 Sep 2019 07:00  --------------------------------------------------------  IN: 900 mL / OUT: 0 mL / NET: 900 mL    28 Sep 2019 07:  -  28 Sep 2019 14:54  --------------------------------------------------------  IN: 780 mL / OUT: 0 mL / NET: 780 mL        Physical Exam:  HEENT:   Normal oral mucosa  Cardiovascular: Normal S1 S2,JVP 16 cm H20, No murmurs, 3+edema  Respiratory: Lungs clear to auscultation	  Psychiatry: A & O x 3, Mood & affect appropriate  Gastrointestinal:  Soft, Non-tender, + BS	  Skin: No rashes, No ecchymoses, No cyanosis	  Neurologic: Non-focal  Extremities: Normal range of motion, No clubbing, cyanosis      Labs:        139  |  99  |  23  ----------------------------<  79  3.5   |  29  |  1.51<H>    Ca    9.0      28 Sep 2019 06:54  Mg     1.9                   Serum Pro-Brain Natriuretic Peptide: 00725 pg/mL ( @ 11:40)

## 2019-09-29 LAB
ANION GAP SERPL CALC-SCNC: 14 MMOL/L — SIGNIFICANT CHANGE UP (ref 5–17)
BLD GP AB SCN SERPL QL: NEGATIVE — SIGNIFICANT CHANGE UP
BUN SERPL-MCNC: 29 MG/DL — HIGH (ref 7–23)
CALCIUM SERPL-MCNC: 8.9 MG/DL — SIGNIFICANT CHANGE UP (ref 8.4–10.5)
CHLORIDE SERPL-SCNC: 99 MMOL/L — SIGNIFICANT CHANGE UP (ref 96–108)
CO2 SERPL-SCNC: 28 MMOL/L — SIGNIFICANT CHANGE UP (ref 22–31)
CREAT SERPL-MCNC: 1.6 MG/DL — HIGH (ref 0.5–1.3)
GLUCOSE BLDC GLUCOMTR-MCNC: 102 MG/DL — HIGH (ref 70–99)
GLUCOSE BLDC GLUCOMTR-MCNC: 116 MG/DL — HIGH (ref 70–99)
GLUCOSE BLDC GLUCOMTR-MCNC: 121 MG/DL — HIGH (ref 70–99)
GLUCOSE BLDC GLUCOMTR-MCNC: 136 MG/DL — HIGH (ref 70–99)
GLUCOSE SERPL-MCNC: 82 MG/DL — SIGNIFICANT CHANGE UP (ref 70–99)
HCT VFR BLD CALC: 35.9 % — LOW (ref 39–50)
HGB BLD-MCNC: 11.4 G/DL — LOW (ref 13–17)
MAGNESIUM SERPL-MCNC: 1.9 MG/DL — SIGNIFICANT CHANGE UP (ref 1.6–2.6)
MCHC RBC-ENTMCNC: 28.1 PG — SIGNIFICANT CHANGE UP (ref 27–34)
MCHC RBC-ENTMCNC: 31.8 GM/DL — LOW (ref 32–36)
MCV RBC AUTO: 88.2 FL — SIGNIFICANT CHANGE UP (ref 80–100)
PHOSPHATE SERPL-MCNC: 3.9 MG/DL — SIGNIFICANT CHANGE UP (ref 2.5–4.5)
PLATELET # BLD AUTO: 160 K/UL — SIGNIFICANT CHANGE UP (ref 150–400)
POTASSIUM SERPL-MCNC: 3.5 MMOL/L — SIGNIFICANT CHANGE UP (ref 3.5–5.3)
POTASSIUM SERPL-SCNC: 3.5 MMOL/L — SIGNIFICANT CHANGE UP (ref 3.5–5.3)
RBC # BLD: 4.08 M/UL — LOW (ref 4.2–5.8)
RBC # FLD: 15.9 % — HIGH (ref 10.3–14.5)
RH IG SCN BLD-IMP: POSITIVE — SIGNIFICANT CHANGE UP
SODIUM SERPL-SCNC: 141 MMOL/L — SIGNIFICANT CHANGE UP (ref 135–145)
WBC # BLD: 6.3 K/UL — SIGNIFICANT CHANGE UP (ref 3.8–10.5)
WBC # FLD AUTO: 6.3 K/UL — SIGNIFICANT CHANGE UP (ref 3.8–10.5)

## 2019-09-29 PROCEDURE — 99233 SBSQ HOSP IP/OBS HIGH 50: CPT

## 2019-09-29 RX ORDER — MAGNESIUM SULFATE 500 MG/ML
1 VIAL (ML) INJECTION ONCE
Refills: 0 | Status: COMPLETED | OUTPATIENT
Start: 2019-09-29 | End: 2019-09-29

## 2019-09-29 RX ORDER — POTASSIUM CHLORIDE 20 MEQ
40 PACKET (EA) ORAL ONCE
Refills: 0 | Status: COMPLETED | OUTPATIENT
Start: 2019-09-29 | End: 2019-09-29

## 2019-09-29 RX ADMIN — ISOSORBIDE DINITRATE 40 MILLIGRAM(S): 5 TABLET ORAL at 17:53

## 2019-09-29 RX ADMIN — Medication 75 MILLIGRAM(S): at 05:08

## 2019-09-29 RX ADMIN — Medication 75 MILLIGRAM(S): at 21:24

## 2019-09-29 RX ADMIN — Medication 80 MILLIGRAM(S): at 05:06

## 2019-09-29 RX ADMIN — Medication 40 MILLIGRAM(S): at 17:53

## 2019-09-29 RX ADMIN — ATORVASTATIN CALCIUM 40 MILLIGRAM(S): 80 TABLET, FILM COATED ORAL at 21:23

## 2019-09-29 RX ADMIN — LISINOPRIL 40 MILLIGRAM(S): 2.5 TABLET ORAL at 05:14

## 2019-09-29 RX ADMIN — Medication 100 GRAM(S): at 10:28

## 2019-09-29 RX ADMIN — ISOSORBIDE DINITRATE 40 MILLIGRAM(S): 5 TABLET ORAL at 22:55

## 2019-09-29 RX ADMIN — Medication 75 MILLIGRAM(S): at 13:12

## 2019-09-29 RX ADMIN — APIXABAN 5 MILLIGRAM(S): 2.5 TABLET, FILM COATED ORAL at 05:08

## 2019-09-29 RX ADMIN — ISOSORBIDE DINITRATE 40 MILLIGRAM(S): 5 TABLET ORAL at 05:14

## 2019-09-29 RX ADMIN — Medication 40 MILLIEQUIVALENT(S): at 10:29

## 2019-09-29 RX ADMIN — Medication 81 MILLIGRAM(S): at 13:11

## 2019-09-29 NOTE — CHART NOTE - NSCHARTNOTEFT_GEN_A_CORE
NIK GRIMM    Notified by RN patient with 8 beats of wide complex   patient asymptomatic   Vital Signs Last 24 Hrs  T(C): 36.6 (29 Sep 2019 13:23), Max: 36.6 (28 Sep 2019 20:29)  T(F): 97.8 (29 Sep 2019 13:23), Max: 97.9 (28 Sep 2019 20:29)  HR: 79 (29 Sep 2019 13:23) (59 - 79)  BP: 134/68 (29 Sep 2019 13:23) (134/68 - 147/70)  BP(mean): --  RR: 18 (29 Sep 2019 13:23) (18 - 18)  SpO2: 96% (29 Sep 2019 13:23) (96% - 98%)                        11.4   6.3   )-----------( 160      ( 29 Sep 2019 06:34 )             35.9   09-29    141  |  99  |  29<H>  ----------------------------<  82  3.5   |  28  |  1.60<H>    Ca    8.9      29 Sep 2019 06:34  Phos  3.9     09-29  Mg     1.9     09-29    HPI:  69 M with PMH of HTN, T2DM, HFrEF with EF ~45%, complete heart block s/p PPM, non-Hodgkin lymphoma s/p chemo in 2004 (currently in remission), CKD stage II who p/w dyspnea x 3 days. Patient has been off all of his medications since July d/t lack of PCP. He initially developed dyspnea and ARGUETA a week ago. Presented to the ED on 9/18, given 80mg Lasix, then sent home. Reports that his symptoms improved mildly then returned. He has been noting dyspnea along withe worsening LE edema x 3 days. Endorses to unable to sleep at night, with associated dyspnea on exertion as well. Denies fever, chill, CP, n/v/c/d, urinary concerns. No recent travels, no O2 needs at home, and no sick contacts. History of heart failure, only admission for CHF exacerbation was in May of 2019, where he was also found to have complete heart block 2/2 pacemaker placement.   ED vitals: /93, saturating 95% on RA. Labs with elevated Cr- (at baseline), BNP 39028, trop of 45. CXR without pleural effusion or consolidations. s/p 40mg IVP lasix.  At the time of my examination, patient denies any acute concerns. Has not urinated since the Lasix yet. Has not taken any medications as he ran out on all of them since July and could not establish care with a new PCP. Used to take spironolactone, Laxis, hydralazine, Isordil, and carvedilol for heart failure. Weight after discharge in May 280s, currently about 310lbs. (25 Sep 2019 14:24)    Interventions taken   continue with current medications   supplement lytes   Discussed with DR Clarke   continue with tele monitor                            Neeta BAIG

## 2019-09-29 NOTE — PROGRESS NOTE ADULT - SUBJECTIVE AND OBJECTIVE BOX
Patient is a 69y old  Male who presents with a chief complaint of SOB, LE edema (28 Sep 2019 17:21)      SUBJECTIVE / OVERNIGHT EVENTS: feels ok, denies cp, sob, palpitations, abd pain     MEDICATIONS  (STANDING):  apixaban 5 milliGRAM(s) Oral every 12 hours  aspirin enteric coated 81 milliGRAM(s) Oral daily  atorvastatin 40 milliGRAM(s) Oral at bedtime  dextrose 5%. 1000 milliLiter(s) (50 mL/Hr) IV Continuous <Continuous>  dextrose 50% Injectable 12.5 Gram(s) IV Push once  dextrose 50% Injectable 25 Gram(s) IV Push once  dextrose 50% Injectable 25 Gram(s) IV Push once  furosemide   Injectable 80 milliGRAM(s) IV Push two times a day  hydrALAZINE 75 milliGRAM(s) Oral three times a day  influenza   Vaccine 0.5 milliLiter(s) IntraMuscular once  insulin lispro (HumaLOG) corrective regimen sliding scale   SubCutaneous Before meals and at bedtime  isosorbide   dinitrate Tablet (ISORDIL) 40 milliGRAM(s) Oral three times a day  lisinopril 40 milliGRAM(s) Oral daily    MEDICATIONS  (PRN):  dextrose 40% Gel 15 Gram(s) Oral once PRN Blood Glucose LESS THAN 70 milliGRAM(s)/deciliter  glucagon  Injectable 1 milliGRAM(s) IntraMuscular once PRN Glucose LESS THAN 70 milligrams/deciliter        CAPILLARY BLOOD GLUCOSE      POCT Blood Glucose.: 121 mg/dL (29 Sep 2019 12:55)  POCT Blood Glucose.: 102 mg/dL (29 Sep 2019 08:46)  POCT Blood Glucose.: 132 mg/dL (28 Sep 2019 21:10)  POCT Blood Glucose.: 118 mg/dL (28 Sep 2019 17:38)    I&O's Summary    28 Sep 2019 07:01  -  29 Sep 2019 07:00  --------------------------------------------------------  IN: 1020 mL / OUT: 0 mL / NET: 1020 mL        PHYSICAL EXAM:  GENERAL: NAD, well-developed  HEAD:  Atraumatic, Normocephalic  EYES: conjunctiva and sclera clear  CHEST/LUNG: Clear to auscultation bilaterally; No wheeze  HEART: Regular rate and rhythm;S1S2  ABDOMEN: Soft, Nontender, Nondistended; Bowel sounds present  EXTREMITIES:  +2-3 LE edema b/l  PSYCH: AAOx3      LABS:                        11.4   6.3   )-----------( 160      ( 29 Sep 2019 06:34 )             35.9     09-29    141  |  99  |  29<H>  ----------------------------<  82  3.5   |  28  |  1.60<H>    Ca    8.9      29 Sep 2019 06:34  Phos  3.9     09-29  Mg     1.9     09-29                RADIOLOGY & ADDITIONAL TESTS:    Imaging Personally Reviewed:    Consultant(s) Notes Reviewed:      Care Discussed with Consultants/Other Providers:

## 2019-09-30 ENCOUNTER — OTHER (OUTPATIENT)
Age: 69
End: 2019-09-30

## 2019-09-30 LAB
ANION GAP SERPL CALC-SCNC: 12 MMOL/L — SIGNIFICANT CHANGE UP (ref 5–17)
BLD GP AB SCN SERPL QL: NEGATIVE — SIGNIFICANT CHANGE UP
BUN SERPL-MCNC: 31 MG/DL — HIGH (ref 7–23)
CALCIUM SERPL-MCNC: 8.9 MG/DL — SIGNIFICANT CHANGE UP (ref 8.4–10.5)
CHLORIDE SERPL-SCNC: 99 MMOL/L — SIGNIFICANT CHANGE UP (ref 96–108)
CO2 SERPL-SCNC: 29 MMOL/L — SIGNIFICANT CHANGE UP (ref 22–31)
CREAT SERPL-MCNC: 1.45 MG/DL — HIGH (ref 0.5–1.3)
GLUCOSE BLDC GLUCOMTR-MCNC: 140 MG/DL — HIGH (ref 70–99)
GLUCOSE BLDC GLUCOMTR-MCNC: 145 MG/DL — HIGH (ref 70–99)
GLUCOSE BLDC GLUCOMTR-MCNC: 86 MG/DL — SIGNIFICANT CHANGE UP (ref 70–99)
GLUCOSE BLDC GLUCOMTR-MCNC: 89 MG/DL — SIGNIFICANT CHANGE UP (ref 70–99)
GLUCOSE SERPL-MCNC: 90 MG/DL — SIGNIFICANT CHANGE UP (ref 70–99)
HCT VFR BLD CALC: 38.3 % — LOW (ref 39–50)
HGB BLD-MCNC: 11.4 G/DL — LOW (ref 13–17)
MAGNESIUM SERPL-MCNC: 2.2 MG/DL — SIGNIFICANT CHANGE UP (ref 1.6–2.6)
MCHC RBC-ENTMCNC: 26.3 PG — LOW (ref 27–34)
MCHC RBC-ENTMCNC: 29.8 GM/DL — LOW (ref 32–36)
MCV RBC AUTO: 88.2 FL — SIGNIFICANT CHANGE UP (ref 80–100)
PHOSPHATE SERPL-MCNC: 3.4 MG/DL — SIGNIFICANT CHANGE UP (ref 2.5–4.5)
PLATELET # BLD AUTO: 184 K/UL — SIGNIFICANT CHANGE UP (ref 150–400)
POTASSIUM SERPL-MCNC: 3.8 MMOL/L — SIGNIFICANT CHANGE UP (ref 3.5–5.3)
POTASSIUM SERPL-SCNC: 3.8 MMOL/L — SIGNIFICANT CHANGE UP (ref 3.5–5.3)
RBC # BLD: 4.35 M/UL — SIGNIFICANT CHANGE UP (ref 4.2–5.8)
RBC # FLD: 15.9 % — HIGH (ref 10.3–14.5)
RH IG SCN BLD-IMP: POSITIVE — SIGNIFICANT CHANGE UP
SODIUM SERPL-SCNC: 140 MMOL/L — SIGNIFICANT CHANGE UP (ref 135–145)
WBC # BLD: 6.9 K/UL — SIGNIFICANT CHANGE UP (ref 3.8–10.5)
WBC # FLD AUTO: 6.9 K/UL — SIGNIFICANT CHANGE UP (ref 3.8–10.5)

## 2019-09-30 PROCEDURE — 93010 ELECTROCARDIOGRAM REPORT: CPT | Mod: 76

## 2019-09-30 PROCEDURE — 71045 X-RAY EXAM CHEST 1 VIEW: CPT | Mod: 26

## 2019-09-30 PROCEDURE — 33208 INSRT HEART PM ATRIAL & VENT: CPT

## 2019-09-30 PROCEDURE — 99232 SBSQ HOSP IP/OBS MODERATE 35: CPT

## 2019-09-30 RX ORDER — APIXABAN 2.5 MG/1
5 TABLET, FILM COATED ORAL EVERY 12 HOURS
Refills: 0 | Status: DISCONTINUED | OUTPATIENT
Start: 2019-09-30 | End: 2019-10-02

## 2019-09-30 RX ORDER — ACETAMINOPHEN 500 MG
1000 TABLET ORAL ONCE
Refills: 0 | Status: COMPLETED | OUTPATIENT
Start: 2019-09-30 | End: 2019-09-30

## 2019-09-30 RX ADMIN — ISOSORBIDE DINITRATE 40 MILLIGRAM(S): 5 TABLET ORAL at 05:47

## 2019-09-30 RX ADMIN — APIXABAN 5 MILLIGRAM(S): 2.5 TABLET, FILM COATED ORAL at 21:06

## 2019-09-30 RX ADMIN — ISOSORBIDE DINITRATE 40 MILLIGRAM(S): 5 TABLET ORAL at 23:50

## 2019-09-30 RX ADMIN — Medication 81 MILLIGRAM(S): at 16:18

## 2019-09-30 RX ADMIN — LISINOPRIL 40 MILLIGRAM(S): 2.5 TABLET ORAL at 05:47

## 2019-09-30 RX ADMIN — Medication 75 MILLIGRAM(S): at 16:18

## 2019-09-30 RX ADMIN — Medication 400 MILLIGRAM(S): at 16:18

## 2019-09-30 RX ADMIN — Medication 75 MILLIGRAM(S): at 23:50

## 2019-09-30 RX ADMIN — Medication 80 MILLIGRAM(S): at 18:34

## 2019-09-30 RX ADMIN — ATORVASTATIN CALCIUM 40 MILLIGRAM(S): 80 TABLET, FILM COATED ORAL at 21:06

## 2019-09-30 RX ADMIN — Medication 75 MILLIGRAM(S): at 05:47

## 2019-09-30 RX ADMIN — Medication 1000 MILLIGRAM(S): at 16:40

## 2019-09-30 RX ADMIN — Medication 80 MILLIGRAM(S): at 05:47

## 2019-09-30 RX ADMIN — ISOSORBIDE DINITRATE 40 MILLIGRAM(S): 5 TABLET ORAL at 16:22

## 2019-09-30 NOTE — PROGRESS NOTE ADULT - SUBJECTIVE AND OBJECTIVE BOX
24H hour events:   No events overnight. Resting in bed comfortably. Denies chest pain, shortness of breath, and palpitations.     MEDICATIONS:  aspirin enteric coated 81 milliGRAM(s) Oral daily  furosemide   Injectable 80 milliGRAM(s) IV Push two times a day  hydrALAZINE 75 milliGRAM(s) Oral three times a day  isosorbide   dinitrate Tablet (ISORDIL) 40 milliGRAM(s) Oral three times a day  lisinopril 40 milliGRAM(s) Oral daily  atorvastatin 40 milliGRAM(s) Oral at bedtime  dextrose 40% Gel 15 Gram(s) Oral once PRN  dextrose 50% Injectable 12.5 Gram(s) IV Push once  dextrose 50% Injectable 25 Gram(s) IV Push once  dextrose 50% Injectable 25 Gram(s) IV Push once  glucagon  Injectable 1 milliGRAM(s) IntraMuscular once PRN  insulin lispro (HumaLOG) corrective regimen sliding scale   SubCutaneous Before meals and at bedtime  dextrose 5%. 1000 milliLiter(s) IV Continuous <Continuous>  influenza   Vaccine 0.5 milliLiter(s) IntraMuscular once      REVIEW OF SYSTEMS:  Complete 10point ROS negative.    PHYSICAL EXAM:  T(C): 36.4 (09-30-19 @ 05:11), Max: 36.6 (09-29-19 @ 13:23)  HR: 74 (09-30-19 @ 05:11) (59 - 79)  BP: 163/75 (09-30-19 @ 05:11) (134/57 - 163/75)  RR: 18 (09-30-19 @ 05:11) (18 - 18)  SpO2: 96% (09-30-19 @ 05:11) (94% - 96%)  Wt(kg): --  I&O's Summary    29 Sep 2019 07:01  -  30 Sep 2019 07:00  --------------------------------------------------------  IN: 410 mL / OUT: 0 mL / NET: 410 mL        Appearance: Normal	  Cardiovascular: Normal S1 S2, Regular  Respiratory: Left lower lobe crackles, right lung clear to ausculation 	  Psychiatry: Mood & affect appropriate  Gastrointestinal:  Soft, Non-tender, + BS	  Skin: No rashes, No ecchymoses, No cyanosis	  Extremities: Normal range of motion, +1 pitting edema b/l lower extremities  Vascular: Peripheral pulses palpable 2+ bilaterally        LABS:	 	    CBC Full  -  ( 30 Sep 2019 06:51 )  WBC Count : 6.9 K/uL  Hemoglobin : 11.4 g/dL  Hematocrit : 38.3 %  Platelet Count - Automated : 184 K/uL  Mean Cell Volume : 88.2 fl  Mean Cell Hemoglobin : 26.3 pg  Mean Cell Hemoglobin Concentration : 29.8 gm/dL  Auto Neutrophil # : x  Auto Lymphocyte # : x  Auto Monocyte # : x  Auto Eosinophil # : x  Auto Basophil # : x  Auto Neutrophil % : x  Auto Lymphocyte % : x  Auto Monocyte % : x  Auto Eosinophil % : x  Auto Basophil % : x    09-30    140  |  99  |  31<H>  ----------------------------<  90  3.8   |  29  |  1.45<H>  09-29    141  |  99  |  29<H>  ----------------------------<  82  3.5   |  28  |  1.60<H>    Ca    8.9      30 Sep 2019 06:51  Ca    8.9      29 Sep 2019 06:34  Phos  3.4     09-30  Phos  3.9     09-29  Mg     2.2     09-30  Mg     1.9     09-29        proBNP: Serum Pro-Brain Natriuretic Peptide: 81898 pg/mL (09-25 @ 11:40)    TELEMETRY: Vpaced Atrial Flutter 60's-70'sbpm with int PVC	    PREVIOUS DIAGNOSTIC TESTING:    Repeat echo:   	< from: TTE with Doppler (w/Cont) (09.27.19 @ 11:25) >  Patient name: NIK GRIMM  YOB: 1950   Age: 69 (M)   MR#: [id number]  Study Date: 9/27/2019  [facility]  Study quality: Technically difficult  Referring Physician: Ata flores MD  Blood Pressure: 120/80 mmHg  Height: 183 cm  Weight: 97 kg  BSA: 2.2 m2  Heart Rhythm: Atrial flutter  ------------------------------------------------------------------------  PROCEDURE: Transthoracic echocardiogram with 2-D, M-Mode  and completespectral and color flow Doppler. Verbal  consent was obtained for injection of  Ultrasonic Enhancing  Agent following a discussion of risks and benefits.  Following intravenous injection of Ultrasonic Enhancing  Agent , harmonic imaging was performed.  INDICATION: Unspecified combined systolic (congestive) and  diastolic (congestive) heart failure (I50.40)  ------------------------------------------------------------------------  Dimensions:    Normal Values:  LA:     4.7    2.0 - 4.0 cm  Ao:3.4    2.0 - 3.8 cm  SEPTUM: 1.2    0.6 - 1.2 cm  PWT:    1.0    0.6 - 1.1 cm  LVIDd:  5.7    3.0 - 5.6 cm  LVIDs:  4.4    1.8 - 4.0 cm  Derived variables:  LVMI: 117 g/m2  RWT: 0.35  EF (Visual Estimate): 40 %  ------------------------------------------------------------------------  Observations:  Mitral Valve: Mitral annular calcification.  Functional mitral regurgitation, probably severe.  Aortic Valve/Aorta: Normal aortic valve.  Normal aortic root size.  Left Atrium: Moderately dilated leftatrium.  Left Ventricle: Endocardial visualization enhanced with  intravenous injection of Ultrasonic Enhancing Agent  (Definity).  Mild left ventricular enlargement.  Estimated ejection fraction 40%. Diffuse hypokinesis.  Right Heart: Normal right atrium. Right ventricle not  optimally visualized.  Normal tricuspid valve. Mild  tricuspid regurgitation. Normal pulmonic valve.  Pericardium/Pleura: Trace pericardial effusion.  Hemodynamic: Estimated right atrial pressure is severely  increased.  Severe pulmonary hypertension. Estimated PASP 90-95 mmHg.  ------------------------------------------------------------------------  Conclusions:  Technically difficult study. Endocardial visualization  enhanced with intravenous injection of Ultrasonic Enhancing  Agent (Definity).  Mild left ventricular enlargement.  Estimated ejection fraction 40%. Diffuse hypokinesis.  Functional mitral regurgitation, probably severe.  Severe pulmonary hypertension.  ------------------------------------------------------------------------  Confirmed on  9/27/2019 - 17:45:56 by Juan Alberto Garcia M.D.  ------------------------------------------------------------------------    < end of copied text >

## 2019-09-30 NOTE — PROGRESS NOTE ADULT - SUBJECTIVE AND OBJECTIVE BOX
Patient is a 69y old  Male who presents with a chief complaint of SOB, LE edema (30 Sep 2019 08:37)        SUBJECTIVE / OVERNIGHT EVENTS: no acute complaints       MEDICATIONS  (STANDING):  aspirin enteric coated 81 milliGRAM(s) Oral daily  atorvastatin 40 milliGRAM(s) Oral at bedtime  dextrose 5%. 1000 milliLiter(s) (50 mL/Hr) IV Continuous <Continuous>  dextrose 50% Injectable 12.5 Gram(s) IV Push once  dextrose 50% Injectable 25 Gram(s) IV Push once  dextrose 50% Injectable 25 Gram(s) IV Push once  furosemide   Injectable 80 milliGRAM(s) IV Push two times a day  hydrALAZINE 75 milliGRAM(s) Oral three times a day  influenza   Vaccine 0.5 milliLiter(s) IntraMuscular once  insulin lispro (HumaLOG) corrective regimen sliding scale   SubCutaneous Before meals and at bedtime  isosorbide   dinitrate Tablet (ISORDIL) 40 milliGRAM(s) Oral three times a day  lisinopril 40 milliGRAM(s) Oral daily    MEDICATIONS  (PRN):  dextrose 40% Gel 15 Gram(s) Oral once PRN Blood Glucose LESS THAN 70 milliGRAM(s)/deciliter  glucagon  Injectable 1 milliGRAM(s) IntraMuscular once PRN Glucose LESS THAN 70 milligrams/deciliter      Vital Signs Last 24 Hrs  T(C): 36.4 (30 Sep 2019 05:11), Max: 36.6 (29 Sep 2019 20:46)  T(F): 97.6 (30 Sep 2019 05:11), Max: 97.8 (29 Sep 2019 20:46)  HR: 74 (30 Sep 2019 05:11) (59 - 74)  BP: 163/75 (30 Sep 2019 05:11) (134/57 - 163/75)  BP(mean): --  RR: 18 (30 Sep 2019 05:11) (18 - 18)  SpO2: 96% (30 Sep 2019 05:11) (94% - 96%)  CAPILLARY BLOOD GLUCOSE      POCT Blood Glucose.: 89 mg/dL (30 Sep 2019 13:56)  POCT Blood Glucose.: 86 mg/dL (30 Sep 2019 09:05)  POCT Blood Glucose.: 136 mg/dL (29 Sep 2019 21:22)  POCT Blood Glucose.: 116 mg/dL (29 Sep 2019 17:32)    I&O's Summary    29 Sep 2019 07:01  -  30 Sep 2019 07:00  --------------------------------------------------------  IN: 410 mL / OUT: 0 mL / NET: 410 mL    30 Sep 2019 07:01  -  30 Sep 2019 14:08  --------------------------------------------------------  IN: 240 mL / OUT: 0 mL / NET: 240 mL        PHYSICAL EXAM:  GENERAL: NAD, breathing normal  EYES: conjunctiva and sclera clear  NECK: supple, + mild JVD  CHEST/LUNG: slight decreased bs at bases, otherwise clear (though did not sit up for exam)  HEART: S1 S2 RRR  ABDOMEN: +BS Soft, NT/ND  EXTREMITIES:  2+ DP Pulses, No c/c. 1+b/l LE edema up to calves  NEUROLOGY: AAOx3, no facial droop, no focal deficits   SKIN: No rashes or lesions      LABS:                        11.4   6.9   )-----------( 184      ( 30 Sep 2019 06:51 )             38.3     09-30    140  |  99  |  31<H>  ----------------------------<  90  3.8   |  29  |  1.45<H>    Ca    8.9      30 Sep 2019 06:51  Phos  3.4     09-30  Mg     2.2     09-30                RADIOLOGY & ADDITIONAL TESTS:    Imaging Personally Reviewed:  Consultant(s) Notes Reviewed:    Care Discussed with Consultants/Other Providers:

## 2019-09-30 NOTE — PROGRESS NOTE ADULT - ASSESSMENT
69 M with PMH of HTN, DM2, HFrEF with EF ~45% (5/2019), complete heart block s/p Micra pacemaker 5/2019, non-Hodgkin lymphoma s/p chemo in 2014 (RCHOP/BR, currently in remission), CKD stage II who p/w SOB for last few days and  lower extremity edema. Patient was seen in May 2019 for AMS, newly diagnosed dementia, found to have CHB s/p micra placement. He was also found to have new HF at that time with severe MR and dilated annulus. He was diuresed significantly and discharged for outpatient mitraclip eval. However, pt did not follow up. He ran out of his medications approximately 2 months ago and has not taken any of his cardiac meds since. EP consulted for chronic RV pacing consideration for device upgrade to CRT-P.    #Chronic RV pacing  -s/p Micra pacemaker interrogation 9/26/10: Vpaced 86.5% with underlying rhythm Aflutter with CHB (see interrogation note for official report)  -CRT-P upgrade today 9/30/19, discussed with daughter Vonnie, as well as the patient, everyone in agreement    #Acute on Chronic Systolic Heart Failure  -Currently on IV lasix   -Continue on lisinopril, hydralazine  -Repeat Echo EF 40% and mitral regurgitation probably severe 9/27/19    #New onset Atrial Flutter  -[id number] Score 4, will resume Apixaban 5mg BID post CRT-P upgrade, timing to be determined.    #Altered Mental Status  -Neuro input appreciated      Zo GOODSON/Valentino PARK  #[id number]

## 2019-09-30 NOTE — PRE-ANESTHESIA EVALUATION ADULT - NSANTHOSAYNRD_GEN_A_CORE
No. KAVITHA screening performed.  STOP BANG Legend: 0-2 = LOW Risk; 3-4 = INTERMEDIATE Risk; 5-8 = HIGH Risk

## 2019-10-01 LAB
ANION GAP SERPL CALC-SCNC: 10 MMOL/L — SIGNIFICANT CHANGE UP (ref 5–17)
BUN SERPL-MCNC: 31 MG/DL — HIGH (ref 7–23)
CALCIUM SERPL-MCNC: 8.9 MG/DL — SIGNIFICANT CHANGE UP (ref 8.4–10.5)
CHLORIDE SERPL-SCNC: 96 MMOL/L — SIGNIFICANT CHANGE UP (ref 96–108)
CO2 SERPL-SCNC: 30 MMOL/L — SIGNIFICANT CHANGE UP (ref 22–31)
CREAT SERPL-MCNC: 1.56 MG/DL — HIGH (ref 0.5–1.3)
GLUCOSE BLDC GLUCOMTR-MCNC: 132 MG/DL — HIGH (ref 70–99)
GLUCOSE BLDC GLUCOMTR-MCNC: 140 MG/DL — HIGH (ref 70–99)
GLUCOSE BLDC GLUCOMTR-MCNC: 154 MG/DL — HIGH (ref 70–99)
GLUCOSE BLDC GLUCOMTR-MCNC: 98 MG/DL — SIGNIFICANT CHANGE UP (ref 70–99)
GLUCOSE SERPL-MCNC: 108 MG/DL — HIGH (ref 70–99)
HCT VFR BLD CALC: 38.7 % — LOW (ref 39–50)
HGB BLD-MCNC: 11.8 G/DL — LOW (ref 13–17)
MCHC RBC-ENTMCNC: 26.8 PG — LOW (ref 27–34)
MCHC RBC-ENTMCNC: 30.4 GM/DL — LOW (ref 32–36)
MCV RBC AUTO: 88.1 FL — SIGNIFICANT CHANGE UP (ref 80–100)
PLATELET # BLD AUTO: 178 K/UL — SIGNIFICANT CHANGE UP (ref 150–400)
POTASSIUM SERPL-MCNC: 3.6 MMOL/L — SIGNIFICANT CHANGE UP (ref 3.5–5.3)
POTASSIUM SERPL-SCNC: 3.6 MMOL/L — SIGNIFICANT CHANGE UP (ref 3.5–5.3)
RBC # BLD: 4.39 M/UL — SIGNIFICANT CHANGE UP (ref 4.2–5.8)
RBC # FLD: 15.8 % — HIGH (ref 10.3–14.5)
SODIUM SERPL-SCNC: 136 MMOL/L — SIGNIFICANT CHANGE UP (ref 135–145)
WBC # BLD: 7.5 K/UL — SIGNIFICANT CHANGE UP (ref 3.8–10.5)
WBC # FLD AUTO: 7.5 K/UL — SIGNIFICANT CHANGE UP (ref 3.8–10.5)

## 2019-10-01 PROCEDURE — 99233 SBSQ HOSP IP/OBS HIGH 50: CPT

## 2019-10-01 PROCEDURE — 71046 X-RAY EXAM CHEST 2 VIEWS: CPT | Mod: 26

## 2019-10-01 PROCEDURE — 93010 ELECTROCARDIOGRAM REPORT: CPT

## 2019-10-01 PROCEDURE — 99232 SBSQ HOSP IP/OBS MODERATE 35: CPT

## 2019-10-01 RX ORDER — ACETAMINOPHEN 500 MG
975 TABLET ORAL ONCE
Refills: 0 | Status: COMPLETED | OUTPATIENT
Start: 2019-10-01 | End: 2019-10-01

## 2019-10-01 RX ORDER — SPIRONOLACTONE 25 MG/1
12.5 TABLET, FILM COATED ORAL DAILY
Refills: 0 | Status: DISCONTINUED | OUTPATIENT
Start: 2019-10-02 | End: 2019-10-02

## 2019-10-01 RX ORDER — ACETAMINOPHEN 500 MG
650 TABLET ORAL EVERY 6 HOURS
Refills: 0 | Status: DISCONTINUED | OUTPATIENT
Start: 2019-10-01 | End: 2019-10-02

## 2019-10-01 RX ORDER — CARVEDILOL PHOSPHATE 80 MG/1
6.25 CAPSULE, EXTENDED RELEASE ORAL EVERY 12 HOURS
Refills: 0 | Status: DISCONTINUED | OUTPATIENT
Start: 2019-10-01 | End: 2019-10-02

## 2019-10-01 RX ADMIN — ATORVASTATIN CALCIUM 40 MILLIGRAM(S): 80 TABLET, FILM COATED ORAL at 21:21

## 2019-10-01 RX ADMIN — ISOSORBIDE DINITRATE 40 MILLIGRAM(S): 5 TABLET ORAL at 05:49

## 2019-10-01 RX ADMIN — CARVEDILOL PHOSPHATE 6.25 MILLIGRAM(S): 80 CAPSULE, EXTENDED RELEASE ORAL at 17:26

## 2019-10-01 RX ADMIN — Medication 650 MILLIGRAM(S): at 15:37

## 2019-10-01 RX ADMIN — Medication 80 MILLIGRAM(S): at 17:24

## 2019-10-01 RX ADMIN — ISOSORBIDE DINITRATE 40 MILLIGRAM(S): 5 TABLET ORAL at 21:20

## 2019-10-01 RX ADMIN — Medication 975 MILLIGRAM(S): at 05:49

## 2019-10-01 RX ADMIN — APIXABAN 5 MILLIGRAM(S): 2.5 TABLET, FILM COATED ORAL at 17:24

## 2019-10-01 RX ADMIN — Medication 975 MILLIGRAM(S): at 06:26

## 2019-10-01 RX ADMIN — APIXABAN 5 MILLIGRAM(S): 2.5 TABLET, FILM COATED ORAL at 05:49

## 2019-10-01 RX ADMIN — Medication 75 MILLIGRAM(S): at 13:52

## 2019-10-01 RX ADMIN — Medication 80 MILLIGRAM(S): at 05:48

## 2019-10-01 RX ADMIN — Medication 75 MILLIGRAM(S): at 21:20

## 2019-10-01 RX ADMIN — Medication 650 MILLIGRAM(S): at 16:40

## 2019-10-01 RX ADMIN — Medication 75 MILLIGRAM(S): at 05:49

## 2019-10-01 RX ADMIN — LISINOPRIL 40 MILLIGRAM(S): 2.5 TABLET ORAL at 05:49

## 2019-10-01 RX ADMIN — Medication 81 MILLIGRAM(S): at 13:52

## 2019-10-01 RX ADMIN — ISOSORBIDE DINITRATE 40 MILLIGRAM(S): 5 TABLET ORAL at 13:52

## 2019-10-01 NOTE — PROGRESS NOTE ADULT - PROBLEM SELECTOR PROBLEM 5
Elevated alkaline phosphatase level

## 2019-10-01 NOTE — PROGRESS NOTE ADULT - SUBJECTIVE AND OBJECTIVE BOX
24H hour events:     MEDICATIONS:  apixaban 5 milliGRAM(s) Oral every 12 hours  aspirin enteric coated 81 milliGRAM(s) Oral daily  furosemide   Injectable 80 milliGRAM(s) IV Push two times a day  hydrALAZINE 75 milliGRAM(s) Oral three times a day  isosorbide   dinitrate Tablet (ISORDIL) 40 milliGRAM(s) Oral three times a day  lisinopril 40 milliGRAM(s) Oral daily  atorvastatin 40 milliGRAM(s) Oral at bedtime  dextrose 40% Gel 15 Gram(s) Oral once PRN  dextrose 50% Injectable 12.5 Gram(s) IV Push once  dextrose 50% Injectable 25 Gram(s) IV Push once  dextrose 50% Injectable 25 Gram(s) IV Push once  glucagon  Injectable 1 milliGRAM(s) IntraMuscular once PRN  insulin lispro (HumaLOG) corrective regimen sliding scale   SubCutaneous Before meals and at bedtime    dextrose 5%. 1000 milliLiter(s) IV Continuous <Continuous>  influenza   Vaccine 0.5 milliLiter(s) IntraMuscular once      REVIEW OF SYSTEMS:  Complete 10point ROS negative.    PHYSICAL EXAM:  T(C): 36.6 (10-01-19 @ 04:18), Max: 36.7 (09-30-19 @ 20:44)  HR: 74 (10-01-19 @ 04:18) (69 - 87)  BP: 136/71 (10-01-19 @ 04:18) (115/50 - 163/85)  RR: 18 (10-01-19 @ 04:18) (18 - 18)  SpO2: 98% (10-01-19 @ 04:18) (94% - 98%)    I&O's Summary    30 Sep 2019 07:01  -  01 Oct 2019 07:00  --------------------------------------------------------  IN: 900 mL / OUT: 955 mL / NET: -55 mL        Appearance: Normal	  HEENT:   Normal oral mucosa, PERRL, EOMI	  Lymphatic: No lymphadenopathy  Cardiovascular: Normal S1 S2, No JVD, No murmurs, No edema  Respiratory: Lungs clear to auscultation	  Psychiatry: A & O x 3, Mood & affect appropriate  Gastrointestinal:  Soft, Non-tender, + BS	  Skin: No rashes, No ecchymoses, No cyanosis	  Neurologic: Non-focal  Extremities: Normal range of motion, No clubbing, cyanosis or edema  Vascular: Peripheral pulses palpable 2+ bilaterally        LABS:	 	    CBC Full  -  ( 01 Oct 2019 07:16 )  WBC Count : 7.5 K/uL  Hemoglobin : 11.8 g/dL  Hematocrit : 38.7 %  Platelet Count - Automated : 178 K/uL  Mean Cell Volume : 88.1 fl  Mean Cell Hemoglobin : 26.8 pg  Mean Cell Hemoglobin Concentration : 30.4 gm/dL  Auto Neutrophil # : x  Auto Lymphocyte # : x  Auto Monocyte # : x  Auto Eosinophil # : x  Auto Basophil # : x  Auto Neutrophil % : x  Auto Lymphocyte % : x  Auto Monocyte % : x  Auto Eosinophil % : x  Auto Basophil % : x    10-01    136  |  96  |  31<H>  ----------------------------<  108<H>  3.6   |  30  |  1.56<H>  09-30    140  |  99  |  31<H>  ----------------------------<  90  3.8   |  29  |  1.45<H>    Ca    8.9      01 Oct 2019 07:16  Ca    8.9      30 Sep 2019 06:51  Phos  3.4     09-30  Mg     2.2     09-30        TELEMETRY: Atrial Flutter, Ventricular Paced 70-80bpm with PVC triplets 	    ECG:  	  RADIOLOGY: 9/30/19 CXR reveals appropriate lead placement, f/u official read today 24H hour events:   S/p BIV PPM impant 9/30/19. No events overnight. Patient sitting up in bed having breakfast. Denies chest pain, palpitations, shortness of breath, and light headedness.    MEDICATIONS:  apixaban 5 milliGRAM(s) Oral every 12 hours  aspirin enteric coated 81 milliGRAM(s) Oral daily  furosemide   Injectable 80 milliGRAM(s) IV Push two times a day  hydrALAZINE 75 milliGRAM(s) Oral three times a day  isosorbide   dinitrate Tablet (ISORDIL) 40 milliGRAM(s) Oral three times a day  lisinopril 40 milliGRAM(s) Oral daily  atorvastatin 40 milliGRAM(s) Oral at bedtime  dextrose 40% Gel 15 Gram(s) Oral once PRN  dextrose 50% Injectable 12.5 Gram(s) IV Push once  dextrose 50% Injectable 25 Gram(s) IV Push once  dextrose 50% Injectable 25 Gram(s) IV Push once  glucagon  Injectable 1 milliGRAM(s) IntraMuscular once PRN  insulin lispro (HumaLOG) corrective regimen sliding scale   SubCutaneous Before meals and at bedtime    dextrose 5%. 1000 milliLiter(s) IV Continuous <Continuous>  influenza   Vaccine 0.5 milliLiter(s) IntraMuscular once      REVIEW OF SYSTEMS:  Complete 10point ROS negative.    PHYSICAL EXAM:  T(C): 36.6 (10-01-19 @ 04:18), Max: 36.7 (09-30-19 @ 20:44)  HR: 74 (10-01-19 @ 04:18) (69 - 87)  BP: 136/71 (10-01-19 @ 04:18) (115/50 - 163/85)  RR: 18 (10-01-19 @ 04:18) (18 - 18)  SpO2: 98% (10-01-19 @ 04:18) (94% - 98%)    I&O's Summary    30 Sep 2019 07:01  -  01 Oct 2019 07:00  --------------------------------------------------------  IN: 900 mL / OUT: 955 mL / NET: -55 mL        Appearance: Normal	  HEENT:   Normal oral mucosa, PERRL, EOMI	  Lymphatic: No lymphadenopathy  Cardiovascular: Normal S1 S2, No JVD, No murmurs, No edema  Respiratory: Lungs clear to auscultation	  Psychiatry: Mood & affect appropriate  Gastrointestinal:  Soft, Non-tender, + BS	  Skin: No rashes, No ecchymoses, No cyanosis, left infraclavicular PPM site open to air, steri strips in place, no bleeding, erythema or hematoma.	  Extremities: Normal range of motion, +1 pitting edema b/l lower extremities improving  Vascular: Peripheral pulses palpable 2+ bilaterally        LABS:	 	    CBC Full  -  ( 01 Oct 2019 07:16 )  WBC Count : 7.5 K/uL  Hemoglobin : 11.8 g/dL  Hematocrit : 38.7 %  Platelet Count - Automated : 178 K/uL  Mean Cell Volume : 88.1 fl  Mean Cell Hemoglobin : 26.8 pg  Mean Cell Hemoglobin Concentration : 30.4 gm/dL  Auto Neutrophil # : x  Auto Lymphocyte # : x  Auto Monocyte # : x  Auto Eosinophil # : x  Auto Basophil # : x  Auto Neutrophil % : x  Auto Lymphocyte % : x  Auto Monocyte % : x  Auto Eosinophil % : x  Auto Basophil % : x    10-01    136  |  96  |  31<H>  ----------------------------<  108<H>  3.6   |  30  |  1.56<H>  09-30    140  |  99  |  31<H>  ----------------------------<  90  3.8   |  29  |  1.45<H>    Ca    8.9      01 Oct 2019 07:16  Ca    8.9      30 Sep 2019 06:51  Phos  3.4     09-30  Mg     2.2     09-30        TELEMETRY: Atrial Flutter, Ventricular Paced 70-80bpm with PVC triplets 	    ECG: Atrial Flutter, Ventricular Paced 83bpm with couplet PVC's 9/30/19 post ppm  	  RADIOLOGY: 9/30/19 CXR reveals appropriate lead placement, f/u official read today 24H hour events:   S/p BIV PPM impant 9/30/19. No events overnight. Patient sitting up in bed having breakfast. Denies chest pain, palpitations, shortness of breath, and light headedness.    MEDICATIONS:  apixaban 5 milliGRAM(s) Oral every 12 hours  aspirin enteric coated 81 milliGRAM(s) Oral daily  furosemide   Injectable 80 milliGRAM(s) IV Push two times a day  hydrALAZINE 75 milliGRAM(s) Oral three times a day  isosorbide   dinitrate Tablet (ISORDIL) 40 milliGRAM(s) Oral three times a day  lisinopril 40 milliGRAM(s) Oral daily  atorvastatin 40 milliGRAM(s) Oral at bedtime  dextrose 40% Gel 15 Gram(s) Oral once PRN  dextrose 50% Injectable 12.5 Gram(s) IV Push once  dextrose 50% Injectable 25 Gram(s) IV Push once  dextrose 50% Injectable 25 Gram(s) IV Push once  glucagon  Injectable 1 milliGRAM(s) IntraMuscular once PRN  insulin lispro (HumaLOG) corrective regimen sliding scale   SubCutaneous Before meals and at bedtime    dextrose 5%. 1000 milliLiter(s) IV Continuous <Continuous>  influenza   Vaccine 0.5 milliLiter(s) IntraMuscular once      REVIEW OF SYSTEMS:  Complete 10point ROS negative.    PHYSICAL EXAM:  T(C): 36.6 (10-01-19 @ 04:18), Max: 36.7 (09-30-19 @ 20:44)  HR: 74 (10-01-19 @ 04:18) (69 - 87)  BP: 136/71 (10-01-19 @ 04:18) (115/50 - 163/85)  RR: 18 (10-01-19 @ 04:18) (18 - 18)  SpO2: 98% (10-01-19 @ 04:18) (94% - 98%)    I&O's Summary    30 Sep 2019 07:01  -  01 Oct 2019 07:00  --------------------------------------------------------  IN: 900 mL / OUT: 955 mL / NET: -55 mL        Appearance: Normal	  HEENT:   Normal oral mucosa, PERRL, EOMI	  Lymphatic: No lymphadenopathy  Cardiovascular: Normal S1 S2, No JVD, No murmurs, No edema  Respiratory: Lungs clear to auscultation	  Psychiatry: Mood & affect appropriate  Gastrointestinal:  Soft, Non-tender, + BS	  Skin: No rashes, No ecchymoses, No cyanosis, left infraclavicular PPM site open to air, steri strips in place, no bleeding, erythema or hematoma.	  Extremities: Normal range of motion, +1 pitting edema b/l lower extremities improving  Vascular: Peripheral pulses palpable 2+ bilaterally        LABS:	 	    CBC Full  -  ( 01 Oct 2019 07:16 )  WBC Count : 7.5 K/uL  Hemoglobin : 11.8 g/dL  Hematocrit : 38.7 %  Platelet Count - Automated : 178 K/uL  Mean Cell Volume : 88.1 fl  Mean Cell Hemoglobin : 26.8 pg  Mean Cell Hemoglobin Concentration : 30.4 gm/dL  Auto Neutrophil # : x  Auto Lymphocyte # : x  Auto Monocyte # : x  Auto Eosinophil # : x  Auto Basophil # : x  Auto Neutrophil % : x  Auto Lymphocyte % : x  Auto Monocyte % : x  Auto Eosinophil % : x  Auto Basophil % : x    10-01    136  |  96  |  31<H>  ----------------------------<  108<H>  3.6   |  30  |  1.56<H>  09-30    140  |  99  |  31<H>  ----------------------------<  90  3.8   |  29  |  1.45<H>    Ca    8.9      01 Oct 2019 07:16  Ca    8.9      30 Sep 2019 06:51  Phos  3.4     09-30  Mg     2.2     09-30        TELEMETRY: Atrial Flutter, Ventricular Paced 70-80bpm with PVC triplets 	    ECG: Atrial Flutter, Ventricular Paced 83bpm with couplet PVC's 9/30/19 post ppm  	  RADIOLOGY:   9/30/19 CXR reveals appropriate lead placement.  Impression: There has been interval placement of a left chest wall 3-lead pacemaker.   	There is no pneumothorax.

## 2019-10-01 NOTE — PROGRESS NOTE ADULT - PROBLEM SELECTOR PLAN 7
Not in active flare  - on home allopurinol, cont while inpatient

## 2019-10-01 NOTE — PROGRESS NOTE ADULT - PROBLEM SELECTOR PLAN 3
Supposed to be on home Glimepiride  A1c-6.0 -?may not need glimepiride on d/c  - ISS while inpatient
Supposed to be on home Glimepiride  A1c-6.0  - ISS while inpatient
Supposed to be on home Glimepiride  A1c-6.0 -?may not need glimepiride on d/c  - ISS while inpatient
Supposed to be on home Glimepiride  A1c-6.0  - ISS while inpatient

## 2019-10-01 NOTE — PROGRESS NOTE ADULT - ASSESSMENT
Pt needs order placed for Basic Metabolic panel per pre op order from hospital. Pt is fasting and can complete testing today. Please call her at [phone] after order is placed.  Routed to Dr Michael Bernard 69 M with PMH of HTN, T2DM, HFrEF with EF ~45%, chronic gout, complete heart block s/p PPM, non-Hodgkin lymphoma s/p chemo in 2004 (currently in remission), CKD stage II who p/w dyspnea x 3 days, admitted for acute on chronic decompensated heart failure.

## 2019-10-01 NOTE — PROGRESS NOTE ADULT - PROBLEM SELECTOR PROBLEM 3
DM type 2 (diabetes mellitus, type 2)

## 2019-10-01 NOTE — PROGRESS NOTE ADULT - PROBLEM SELECTOR PLAN 2
severe MR on TE 5/2019  ?mitralclip previously planned as outpatient   HF f/u
severe MR on TE 5/2019, was supposed to be on Hydralazine 100 tid and Isordil 40 tid per cards note  - HF consult appreciated  ?mitralclip if is a candidate
severe MR on TE 5/2019  ?mitralclip previously planned as outpatient   HF f/u

## 2019-10-01 NOTE — PROGRESS NOTE ADULT - SUBJECTIVE AND OBJECTIVE BOX
Admitting Diagnosis:  Heart failure (I50.9): HEART FAILURE, UNSPECIFIED      HPI:  This is a 69y year old Male with the below past medical history who presents with the chief complaint of shortness of breath, consult for confusion.  He has a hx of HTN, T2DM, HFrEF with EF ~45%, complete heart block s/p PPM, non-Hodgkin lymphoma s/p chemo in 2004 (currently in remission), CKD stage II who p/w dyspnea x 3 days. Patient has been off all of his medications since July d/t lack of PCP. He initially developed dyspnea and ARGUETA a week ago. Presented to the ED on 9/18, given 80mg Lasix, then sent home. Reports that his symptoms improved mildly then returned. He has been noting dyspnea along withe worsening LE edema x 3 days. Endorses to unable to sleep at night, with associated dyspnea on exertion as well.     Pt has been noted to have confusion and forgetfulness by heart failure team and family.  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  At that time he showed poor orientation and insight.  advised to have outpt neurocog eval.   Pt denies any issues at this time.    s/p PPM upgrade yesterday, feels well and wants to go home, no complaints    Past Medical History:  Pleural effusion (J90)  Moderate mitral regurgitation (I34.0)  Systolic heart failure (I50.20)  Rhinitis, allergic (J30.9)  Essential hypertension (I10)  DM type 2 (diabetes mellitus, type 2) (E11.9): Never on insulin  Diabetes Mellitus (250.00)  Non-Hodgkins Lymphoma (202.80): In [facility] for &gt; 10 years      Past Surgical History:  Cardiac pacemaker (Z95.0)  Non-Hodgkin lymphoma (C85.90): h/o axillary dissection  No significant past surgical history ([id number])      Social History:  No toxic habits    Family History:  FAMILY HISTORY:  Family history of non-Hodgkin's lymphoma (Sibling)  Family history of CHF (congestive heart failure): Mother      Allergies:  No Known Allergies      ROS:  Constitutional: Patient offers no complaints of fevers or significant weight loss  Ears, Nose, Mouth and Throat: The patient presents with no abnormalities of the head, ears, eyes, nose or throat  Skin: Patient offers no concerns of new rashes or lesions  Respiratory: The patient presents with no abnormalities of the respiratory tract  Cardiovascular: The patient presents with no cardiac abnormalities  Gastrointestinal: The patient presents with no abnormalities of the GI system  Genitourinary: The patient presents with no dysuria, hematuria or frequent urination  Neurological: See HPI  Endocrine: Patient offers no complaints of excessive thirst, urination, or heat/cold intolerance    Advanced care planning reviewed and noted in the chart.    Medications:  apixaban 5 milliGRAM(s) Oral every 12 hours  aspirin enteric coated 81 milliGRAM(s) Oral daily  atorvastatin 40 milliGRAM(s) Oral at bedtime  dextrose 40% Gel 15 Gram(s) Oral once PRN  dextrose 5%. 1000 milliLiter(s) IV Continuous <Continuous>  dextrose 50% Injectable 12.5 Gram(s) IV Push once  dextrose 50% Injectable 25 Gram(s) IV Push once  dextrose 50% Injectable 25 Gram(s) IV Push once  furosemide   Injectable 80 milliGRAM(s) IV Push two times a day  glucagon  Injectable 1 milliGRAM(s) IntraMuscular once PRN  hydrALAZINE 75 milliGRAM(s) Oral three times a day  influenza   Vaccine 0.5 milliLiter(s) IntraMuscular once  insulin lispro (HumaLOG) corrective regimen sliding scale   SubCutaneous Before meals and at bedtime  isosorbide   dinitrate Tablet (ISORDIL) 40 milliGRAM(s) Oral three times a day  lisinopril 40 milliGRAM(s) Oral daily      Labs:  CBC Full  -  ( 01 Oct 2019 07:16 )  WBC Count : 7.5 K/uL  RBC Count : 4.39 M/uL  Hemoglobin : 11.8 g/dL  Hematocrit : 38.7 %  Platelet Count - Automated : 178 K/uL  Mean Cell Volume : 88.1 fl  Mean Cell Hemoglobin : 26.8 pg  Mean Cell Hemoglobin Concentration : 30.4 gm/dL  Auto Neutrophil # : x  Auto Lymphocyte # : x  Auto Monocyte # : x  Auto Eosinophil # : x  Auto Basophil # : x  Auto Neutrophil % : x  Auto Lymphocyte % : x  Auto Monocyte % : x  Auto Eosinophil % : x  Auto Basophil % : x    10-01    136  |  96  |  31<H>  ----------------------------<  108<H>  3.6   |  30  |  1.56<H>    Ca    8.9      01 Oct 2019 07:16  Phos  3.4     09-30  Mg     2.2     09-30      CAPILLARY BLOOD GLUCOSE      POCT Blood Glucose.: 98 mg/dL (01 Oct 2019 08:44)  POCT Blood Glucose.: 140 mg/dL (30 Sep 2019 21:29)  POCT Blood Glucose.: 145 mg/dL (30 Sep 2019 17:25)  POCT Blood Glucose.: 89 mg/dL (30 Sep 2019 13:56)              Vitals:  Vital Signs Last 24 Hrs  T(C): 36.6 (01 Oct 2019 04:18), Max: 36.7 (30 Sep 2019 20:44)  T(F): 97.8 (01 Oct 2019 04:18), Max: 98 (30 Sep 2019 20:44)  HR: 74 (01 Oct 2019 04:18) (69 - 87)  BP: 136/71 (01 Oct 2019 04:18) (115/50 - 163/85)  BP(mean): --  RR: 18 (01 Oct 2019 04:18) (18 - 18)  SpO2: 98% (01 Oct 2019 04:18) (94% - 98%)    NEUROLOGICAL EXAM:    Mental status: Awake, alert, and in no apparent distress. Oriented to person. place and time. no confusion observed. Language function is normal. Recent memory, digit span and concentration were normal.     Cranial Nerves: Pupils were equal, round, reactive to light. Extraocular movements were intact. Visual field were full. . Facial sensation was intact to light touch. There was no facial asymmetry. The palate was upgoing symmetrically and tongue was midline. Hearing acuity was intact to finger rub AU. Shoulder shrug was full bilaterally    Motor exam: Bulk and tone were normal. Strength was 5/5 in all four extremities. Fine finger movements were symmetric and normal. There was no pronator drift    Reflexes: 2+ in the bilateral upper extremities. 2+ in the bilateral lower extremities. Toes were downgoing bilaterally.     Sensation: Intact to light touch, temperature, vibration and proprioception.     Coordination: Finger-nose-finger was without dysmetria.     Gait: deferred

## 2019-10-01 NOTE — PROGRESS NOTE ADULT - PROBLEM SELECTOR PLAN 9
DVT: HSQ  Diet: DASH/TLC & carb consistent diet

## 2019-10-01 NOTE — PROGRESS NOTE ADULT - ASSESSMENT
69 M with PMH of HTN, T2DM, HFrEF with EF ~45%, complete heart block s/p PPM, non-Hodgkin lymphoma s/p chemo in 2004 (currently in remission), CKD stage II who p/w dyspnea x 3 days found to have HF exacerbation. Neuro consulted for confusion.  Evaluation similar to exam notated by neurology in May of 2019.  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    Pt does have cognitive impairment, suspect multifactorial with overall poor cardiac health, possible contribution of old infarct, poor sleep, and possibly a degenerative component.  Neuro eval seems stable since prior eval in May.    s/p PPM upgrade     - no need for neurodiagnostics inpatient tests on this admission  - card given.  more appropriate to do cognitive evaluation in outpt setting  - no neuro objection to dc soon when medically cleared.

## 2019-10-01 NOTE — PROGRESS NOTE ADULT - PROBLEM SELECTOR PLAN 1
Dyspnea and LE edema, acute on chronic CHF 2/2 medication non-compliance. BNP 69202. Most recent TTE EF 45%, likely severe MR, moderately enlarged RV , severe pulmonary hypertension. - home meds: Aldactone 25mg QD, carvedilol 12.5mg BID, Hydralazine 100mg TID & Isordil 40mg TID  - has not taken any since July.   - s/p micraPPM for complete heart block - EP called to interrogate -PPM upgrade today  - c/w 80mg Lasix iv BID  currently, restarted on medications - isordil 40mg tid, hydralazine 75mg tid, lisinopril increased to 40mg qd, coreg held for ADHF per HF  - daily weights  - strict in/out  - echo results noted
Dyspnea and LE edema, acute on chronic CHF 2/2 medication non-compliance. BNP 03696. Most recent TTE EF 45%, likely severe MR, moderately enlarged RV , severe pulmonary hypertension. Outpt meds include Lasix 80mg QD, Aldactone 25mg QD, carvedilol 12.5mg BID, Hydralazine & Isordil - has not taken any since July.   - s/p micraPPM for complete heart block - EP called to interrogate - recommending PPM upgrade  - c/w 80mg Lasix iv BID  - home meds: Aldactone 25mg QD, carvedilol 12.5mg BID, Hydralazine 100mg TID & Isordil 40mg TID   currently, restarted on medications - isordil 40mg tid, hydralazine 75mg tid, lisinopril increased to 40mg qd, coreg held for ADHF per HF  - daily weights  - strict in/out  - echo results noted
Dyspnea and LE edema, acute on chronic CHF 2/2 medication non-compliance. BNP 08447. Most recent TTE EF 45%, likely severe MR, moderately enlarged RV , severe pulmonary hypertension. Outpt meds include Lasix 80mg QD, Aldactone 25mg QD, carvedilol 12.5mg BID, Hydralazine & Isordil - has not taken any since July.   - s/p micraPPM for complete heart block - EP called to interrogate - recommending PPM upgrade likely monday   - c/w 80mg Lasix iv BID  - home meds: Aldactone 25mg QD, carvedilol 12.5mg BID, Hydralazine 100mg TID & Isordil 40mg TID   currently, restarted on medications - isordil 40mg tid, hydralazine 75mg tid, lisinopril increased to 40mg qd, coreg held for ADHF per HF  - daily weights  - strict in/out  - echo results noted
Dyspnea and LE edema, acute on chronic CHF 2/2 medication non-compliance. BNP 30650. Most recent TTE EF 45%, likely severe MR, moderately enlarged RV , severe pulmonary hypertension. - home meds: Aldactone 25mg QD, carvedilol 12.5mg BID, Hydralazine 100mg TID & Isordil 40mg TID  - has not taken any since July.   - s/p micraPPM for complete heart block - PPM upgrade 9/30  - c/w 80mg Lasix iv BID  currently, restarted on medications - isordil 40mg tid, hydralazine 75mg tid, lisinopril increased to 40mg qd, coreg held for ADHF per HF  - daily weights  - strict in/out  - echo results noted  ectopy on telemetry - started on coreg (cleared by HF)
Dyspnea and LE edema, acute on chronic CHF 2/2 medication non-compliance. BNP 31659. Most recent TTE EF 45%, likely severe MR, moderately enlarged RV , severe pulmonary hypertension. Outpt meds include Lasix 80mg QD, Aldactone 25mg QD, carvedilol 12.5mg BID, Hydralazine & Isordil - has not taken any since July.   - s/p PPM for complete heart block - EP called to interrogate  - c/w 80mg Lasix iv BID  - home meds: Aldactone 25mg QD, carvedilol 12.5mg BID, Hydralazine 100mg TID & Isordil 40mg TID -> will restart hydralazine 50mg TID and Isordil 20mg TID and uptitrate  - daily weights  - strict in/out  - f/u TTE  - trend trop to peak  - consider HF consult, oneyda recs
Dyspnea and LE edema, acute on chronic CHF 2/2 medication non-compliance. BNP 02089. Most recent TTE EF 45%, likely severe MR, moderately enlarged RV , severe pulmonary hypertension. Outpt meds include Lasix 80mg QD, Aldactone 25mg QD, carvedilol 12.5mg BID, Hydralazine & Isordil - has not taken any since July.   - s/p micraPPM for complete heart block - EP called to interrogate - recommending PPM upgrade  - c/w 80mg Lasix iv BID  - home meds: Aldactone 25mg QD, carvedilol 12.5mg BID, Hydralazine 100mg TID & Isordil 40mg TID   currently, restarted on medications - isordil 40mg tid, hydralazine 75mg tid, lisinopril 20mg qd, coreg held for ADHF per HF  - daily weights  - strict in/out  - f/u TTE

## 2019-10-01 NOTE — PROGRESS NOTE ADULT - SUBJECTIVE AND OBJECTIVE BOX
Patient is a 69y old  Male who presents with a chief complaint of SOB, LE edema (01 Oct 2019 14:38)        SUBJECTIVE / OVERNIGHT EVENTS: no acute complaints       MEDICATIONS  (STANDING):  apixaban 5 milliGRAM(s) Oral every 12 hours  aspirin enteric coated 81 milliGRAM(s) Oral daily  atorvastatin 40 milliGRAM(s) Oral at bedtime  dextrose 5%. 1000 milliLiter(s) (50 mL/Hr) IV Continuous <Continuous>  dextrose 50% Injectable 12.5 Gram(s) IV Push once  dextrose 50% Injectable 25 Gram(s) IV Push once  dextrose 50% Injectable 25 Gram(s) IV Push once  furosemide   Injectable 80 milliGRAM(s) IV Push two times a day  hydrALAZINE 75 milliGRAM(s) Oral three times a day  influenza   Vaccine 0.5 milliLiter(s) IntraMuscular once  insulin lispro (HumaLOG) corrective regimen sliding scale   SubCutaneous Before meals and at bedtime  isosorbide   dinitrate Tablet (ISORDIL) 40 milliGRAM(s) Oral three times a day  lisinopril 40 milliGRAM(s) Oral daily    MEDICATIONS  (PRN):  dextrose 40% Gel 15 Gram(s) Oral once PRN Blood Glucose LESS THAN 70 milliGRAM(s)/deciliter  glucagon  Injectable 1 milliGRAM(s) IntraMuscular once PRN Glucose LESS THAN 70 milligrams/deciliter      Vital Signs Last 24 Hrs  T(C): 36.4 (01 Oct 2019 13:24), Max: 36.7 (30 Sep 2019 20:44)  T(F): 97.6 (01 Oct 2019 13:24), Max: 98 (30 Sep 2019 20:44)  HR: 70 (01 Oct 2019 13:24) (69 - 78)  BP: 125/65 (01 Oct 2019 13:24) (115/50 - 136/71)  BP(mean): --  RR: 17 (01 Oct 2019 13:24) (17 - 18)  SpO2: 96% (01 Oct 2019 13:24) (95% - 98%)  CAPILLARY BLOOD GLUCOSE      POCT Blood Glucose.: 140 mg/dL (01 Oct 2019 12:44)  POCT Blood Glucose.: 98 mg/dL (01 Oct 2019 08:44)  POCT Blood Glucose.: 140 mg/dL (30 Sep 2019 21:29)  POCT Blood Glucose.: 145 mg/dL (30 Sep 2019 17:25)    I&O's Summary    30 Sep 2019 07:01  -  01 Oct 2019 07:00  --------------------------------------------------------  IN: 900 mL / OUT: 955 mL / NET: -55 mL    01 Oct 2019 07:01  -  01 Oct 2019 16:24  --------------------------------------------------------  IN: 600 mL / OUT: 300 mL / NET: 300 mL          PHYSICAL EXAM:  GENERAL: NAD, breathing normal  EYES: conjunctiva and sclera clear  NECK: supple, + mild JVD  CHEST/LUNG: CTA b/l  HEART: S1 S2 RRR  ABDOMEN: +BS Soft, NT/ND  EXTREMITIES:  2+ DP Pulses, No c/c. Trace to 1+ b/l LE edema up to calves  NEUROLOGY: AAOx3, no facial droop, no focal deficits   SKIN: No rashes or lesions      LABS:                        11.8   7.5   )-----------( 178      ( 01 Oct 2019 07:16 )             38.7     10-01    136  |  96  |  31<H>  ----------------------------<  108<H>  3.6   |  30  |  1.56<H>    Ca    8.9      01 Oct 2019 07:16  Phos  3.4     09-30  Mg     2.2     09-30                RADIOLOGY & ADDITIONAL TESTS:    Imaging Personally Reviewed:  Consultant(s) Notes Reviewed:    Care Discussed with Consultants/Other Providers:

## 2019-10-01 NOTE — PROGRESS NOTE ADULT - ASSESSMENT
69 M with PMH of HTN, DM2, HFrEF with EF ~45% (5/2019), complete heart block s/p Micra pacemaker 5/2019, non-Hodgkin lymphoma s/p chemo in 2014 (RCHOP/BR, currently in remission), CKD stage II who p/w SOB for last few days and  lower extremity edema. Patient was seen in May 2019 for AMS, newly diagnosed dementia, found to have CHB s/p micra placement. He was also found to have new HF at that time with severe MR and dilated annulus. He was diuresed significantly and discharged for outpatient mitraclip eval. However, pt did not follow up. He ran out of his medications approximately 2 months ago and has not taken any of his cardiac meds since. EP consulted for chronic RV pacing consideration for device upgrade to CRT-P. Now S/p CRT-P upgrade 9/30/19.    #Chronic RV pacing  -Noted on Micra pacemaker interrogation 9/26/10 to have Vpaced 86.5% with underlying rhythm Aflutter with CHB   -s/p MDT CRT-P upgrade 9/30/19, Micra pacemaker programmed off      #Acute on Chronic Systolic Heart Failure  -Currently on IV lasix   -Continue on lisinopril, hydralazine  -Repeat Echo EF 40% and mitral regurgitation probably severe 9/27/19    #New onset Atrial Flutter  -[id number] Score 4, continue Apixaban 5mg BID 69 M with PMH of HTN, DM2, HFrEF with EF ~45% (5/2019), complete heart block s/p Micra pacemaker 5/2019, non-Hodgkin lymphoma s/p chemo in 2014 (RCHOP/BR, currently in remission), CKD stage II who p/w SOB for last few days and  lower extremity edema. Patient was seen in May 2019 for AMS, newly diagnosed dementia, found to have CHB s/p micra placement. He was also found to have new HF at that time with severe MR and dilated annulus. He was diuresed significantly and discharged for outpatient mitraclip eval. However, pt did not follow up. He ran out of his medications approximately 2 months ago and has not taken any of his cardiac meds since. EP consulted for chronic RV pacing consideration for device upgrade to CRT-P. Now S/p CRT-P upgrade 9/30/19.    #Chronic RV pacing  -Noted on Micra pacemaker interrogation 9/26/10 to have Vpaced 86.5% with underlying rhythm Aflutter with CHB   -s/p MDT CRT-P upgrade 9/30/19, Micra pacemaker programmed off  -CXR reveals appropriate lead placement, f/u on official read   -Post op CRT-P interrogation done and device paired by MDT rep; Normal device function  -PPM ID card and booklet given to patient, teaching enforced and patient verbalized understanding  -Wound check appt scheduled for 10/11/19  at 08:05am.  -Clear from EP perspective for discharge planning     #Acute on Chronic Systolic Heart Failure  -Currently on IV lasix   -Continue on lisinopril, hydralazine  -Repeat Echo EF 40% and mitral regurgitation probably severe 9/27/19    #New onset Atrial Flutter  -[id number] Score 4, continue Apixaban 5mg BID 69 M with PMH of HTN, DM2, HFrEF with EF ~45% (5/2019), complete heart block s/p Micra pacemaker 5/2019, non-Hodgkin lymphoma s/p chemo in 2014 (RCHOP/BR, currently in remission), CKD stage II who p/w SOB for last few days and  lower extremity edema. Patient was seen in May 2019 for AMS, newly diagnosed dementia, found to have CHB s/p micra placement. He was also found to have new HF at that time with severe MR and dilated annulus. He was diuresed significantly and discharged for outpatient mitraclip eval. However, pt did not follow up. He ran out of his medications approximately 2 months ago and has not taken any of his cardiac meds since. EP consulted for chronic RV pacing consideration for device upgrade to CRT-P. Now S/p CRT-P upgrade 9/30/19.    #Chronic RV pacing  -Noted on Micra pacemaker interrogation 9/26/10 to have Vpaced 86.5% with underlying rhythm Aflutter with CHB   -s/p MDT CRT-P upgrade 9/30/19, Micra pacemaker programmed off  -CXR reveals appropriate lead placement, f/u on official read   -Post op CRT-P interrogation done and device paired by MDT rep; Normal device function  -PPM ID card and booklet given to patient, teaching enforced and patient verbalized understanding  -Wound check appt scheduled for 10/11/19  at 08:05am.  -Clear from EP perspective for discharge planning     #Acute on Chronic Systolic Heart Failure  -Currently on IV lasix   -Continue on lisinopril, hydralazine  -Repeat Echo EF 40% and mitral regurgitation probably severe 9/27/19    #New onset Atrial Flutter  -[id number] Score 4, continue Apixaban 5mg BID  -Patient to follow up in a 2-3 weeks for KUSHAL/DCCV outpatient. Booking office notified to schedule and call patient with appointment.    Zo/Louis NP  #[id number] 69 M with PMH of HTN, DM2, HFrEF with EF ~45% (5/2019), complete heart block s/p Micra pacemaker 5/2019, non-Hodgkin lymphoma s/p chemo in 2014 (RCHOP/BR, currently in remission), CKD stage II who p/w SOB for last few days and  lower extremity edema. Patient was seen in May 2019 for AMS, newly diagnosed dementia, found to have CHB s/p micra placement. He was also found to have new HF at that time with severe MR and dilated annulus. He was diuresed significantly and discharged for outpatient mitraclip eval. However, pt did not follow up. He ran out of his medications approximately 2 months ago and has not taken any of his cardiac meds since. EP consulted for chronic RV pacing consideration for device upgrade to CRT-P. Now S/p CRT-P upgrade 9/30/19.    #Chronic RV pacing  -Noted on Micra pacemaker interrogation 9/26/10 to have Vpaced 86.5% with underlying rhythm Aflutter with CHB   -s/p MDT CRT-P upgrade 9/30/19, Micra pacemaker programmed off  -CXR reveals appropriate lead placement, f/u on official read   -Post op CRT-P interrogation done and device paired by MDT rep; Normal device function  -PPM ID card and booklet given to patient, teaching enforced and patient verbalized understanding  -Wound check appt scheduled for 10/11/19  at 08:05am.  -Clear from EP perspective for discharge planning     #Acute on Chronic Systolic Heart Failure  -Currently on IV lasix   -Continue on lisinopril, hydralazine  -Repeat Echo EF 40% and mitral regurgitation probably severe 9/27/19    #New onset Atrial Flutter  -[id number] Score 4, continue Apixaban 5mg BID  -Patient to follow up in a 2-3 weeks for KUSHAL/DCCV outpatient. Booking office notified to schedule and call patient with appointment.    Zo/Louis NP  #[id number]    Addendum 1819: Post ppm activity restrictions reviewed with hcevy Shankar, including post op appointment in EP clinic. Confirmed with daughter Vonnie regarding outpatient KUSHAL/DCCV, which she received an appt for from the booking office.

## 2019-10-01 NOTE — PROGRESS NOTE ADULT - ASSESSMENT
68 YO M with a history of ACC/AHA Stage C HF with borderline EF (LV 5.5 cm, EF 45%) likely from hypertensive cardiomyopathy with severe functional MR, CHB s/p micra PPM 5/2019, and CKD III (baseline Cr 1.5), NHL with history of doxarubicin exposure presenting with acute decompensated heart failure. He was roughly 30 lbs above his last discharge weight and the trigger is not taking his medications for 3 months because he felt well and did not refill prescriptions. He was also found to have atrial flutter which is a new diagnosis. He has since been diuresed 40 lbs this admission and underwent CRT-P upgrade from Micra due to high pacing burden. He is approaching euvolemia     In addition to diuresis he has other avenues of improving his HF trajectory (CRT + restoring sinus rhythm and mitraclip evaluation if MR remains severe) however he is unlikely to be willing to stay long as he requires significant coaxing to remain in the hospital.     Review of pertinent studies reveals:  EKG on admission: atrial flutter, underlying RV pacing, PVCs   TTE 9/2019: LV 5.7 cm, EF 40%, severe functional MR, estimated PASP 90 mmHg   TTE 5/2019: LV 5.5 cm, EF 45%, severe functional MR, moderate TR  RHC 5/2019: RA 17, PA 75/36 (48), PCWP 27, [place] CO/CI 4.9/2.2  LHC 5/2019: non-obstructive CAD aside from borderline significant lesions in small vessels   KUSHAL 5/2019: dilated annulus with tethered leaflets and severe fuctional MR     The plan for today is as follows:  - Start carvedilol 6.25 mg BID and spironoalctone 12.5 mg daily. Continues lisinopril 40 mg daily, hydralizine 75 mg TID, isordil 40 mg TID. Will plan to switch lisinopril to Entresto as outpatient and eventually downtitrate hydralizine/isordil  - Continue IV diuresis, can likely switch to PO in next 1-2 days   - Anticoagulation for AFl, planned for outpatient DCCV  - Continue ASA/statin for CAD  - Elevated PA pressures on TTE likely related to severely elevated filling pressures at time of echo  - Would eventually consider MitraClip evaluation if MR remains severe with CRT pacing and medical optimization

## 2019-10-01 NOTE — PROGRESS NOTE ADULT - PROBLEM SELECTOR PLAN 5
mildly elevated, tbili wnl  GTT mildly elevated - no abdominal pain - tolerating diet   has abdominal and pelvic ascities likely due to HF  f/u outpatient

## 2019-10-01 NOTE — PROGRESS NOTE ADULT - PROBLEM SELECTOR PLAN 4
baseline creatinine 1.4-1.8  - daily BMP  - renally dose all meds and avoid nephrotoxic medications

## 2019-10-01 NOTE — PROGRESS NOTE ADULT - PROBLEM SELECTOR PROBLEM 2
Moderate mitral regurgitation

## 2019-10-01 NOTE — PROGRESS NOTE ADULT - PROBLEM SELECTOR PROBLEM 4
CKD (chronic kidney disease), stage III

## 2019-10-01 NOTE — PROGRESS NOTE ADULT - PROBLEM SELECTOR PROBLEM 1
Biventricular heart failure with reduced left ventricular function

## 2019-10-01 NOTE — PROGRESS NOTE ADULT - PROBLEM SELECTOR PLAN 6
BP elevated in the ED 2/2 medication non-compliance  monitor bp   c/w lisinopril, hydralazine, isordil - doses being adjusted
BP elevated in the ED 2/2 medication non-compliance  monitor bp   c/w lisinopril, hydralazine, isordil - doses being adjusted
BP elevated in the ED 2/2 medication non-compliance  monitor bp   c/w lisinopril, hydralazine, isordil
BP elevated in the ED 2/2 medication non-compliance  monitor bp   c/w lisinopril, hydralazine, isordil - doses being adjusted
BP elevated in the ED 2/2 medication non-compliance  monitor bp   c/w lisinopril, hydralazine, isordil - doses being adjusted
BP elevated in the ED 2/2 medication non-compliance  monitor bp   c/w lisinopril, hydralazine, isordil, added spironolactone and coreg per HF

## 2019-10-01 NOTE — PROGRESS NOTE ADULT - SUBJECTIVE AND OBJECTIVE BOX
Subjective:  Denies complaints today, continues to feel better.    Medications:  apixaban 5 milliGRAM(s) Oral every 12 hours  aspirin enteric coated 81 milliGRAM(s) Oral daily  atorvastatin 40 milliGRAM(s) Oral at bedtime  dextrose 40% Gel 15 Gram(s) Oral once PRN  dextrose 5%. 1000 milliLiter(s) IV Continuous <Continuous>  dextrose 50% Injectable 12.5 Gram(s) IV Push once  dextrose 50% Injectable 25 Gram(s) IV Push once  dextrose 50% Injectable 25 Gram(s) IV Push once  furosemide   Injectable 80 milliGRAM(s) IV Push two times a day  glucagon  Injectable 1 milliGRAM(s) IntraMuscular once PRN  hydrALAZINE 75 milliGRAM(s) Oral three times a day  influenza   Vaccine 0.5 milliLiter(s) IntraMuscular once  insulin lispro (HumaLOG) corrective regimen sliding scale   SubCutaneous Before meals and at bedtime  isosorbide   dinitrate Tablet (ISORDIL) 40 milliGRAM(s) Oral three times a day  lisinopril 40 milliGRAM(s) Oral daily    Vitals:  T(C): 36.4 (10-01-19 @ 13:24), Max: 36.7 (19 @ 20:44)  HR: 70 (10-01-19 @ 13:24) (69 - 87)  BP: 125/65 (10-01-19 @ 13:24) (115/50 - 163/85)  BP(mean): --  RR: 17 (10-01-19 @ 13:24) (17 - 18)  SpO2: 96% (10-01-19 @ 13:24) (95% - 98%)    Daily     Daily Weight in k.7 (01 Oct 2019 04:18)    Weight (kg): 97.5 ( @ 10:15)    I&O's Summary    30 Sep 2019 07:  -  01 Oct 2019 07:00  --------------------------------------------------------  IN: 900 mL / OUT: 955 mL / NET: -55 mL    01 Oct 2019 07:  -  01 Oct 2019 14:39  --------------------------------------------------------  IN: 600 mL / OUT: 300 mL / NET: 300 mL        Physical Exam:  Appearance: No Acute Distress  HEENT: JVP 14 cm H2O, no HJR  Cardiovascular: RRR, Normal S1 S2, 2/6 HSM at apex   Respiratory: Clear to auscultation bilaterally  Gastrointestinal: Soft, Non-tender, non-distended	  Skin: no skin lesions  Neurologic: Non-focal  Extremities: 1+ LE edema, warm and well perfused  Psychiatry: A & O x 3, Mood & affect appropriate      Labs:                        11.8   7.5   )-----------( 178      ( 01 Oct 2019 07:16 )             38.7     10-01    136  |  96  |  31<H>  ----------------------------<  108<H>  3.6   |  30  |  1.56<H>    Ca    8.9      01 Oct 2019 07:16  Phos  3.4     09-30  Mg     2.2     09-30              Serum Pro-Brain Natriuretic Peptide: 98037 pg/mL ( @ 11:40)

## 2019-10-01 NOTE — PROGRESS NOTE ADULT - PROBLEM SELECTOR PROBLEM 8
Non-Hodgkins Lymphoma

## 2019-10-02 ENCOUNTER — TRANSCRIPTION ENCOUNTER (OUTPATIENT)
Age: 69
End: 2019-10-02

## 2019-10-02 VITALS
RESPIRATION RATE: 19 BRPM | OXYGEN SATURATION: 96 % | DIASTOLIC BLOOD PRESSURE: 66 MMHG | TEMPERATURE: 98 F | SYSTOLIC BLOOD PRESSURE: 108 MMHG | HEART RATE: 69 BPM

## 2019-10-02 LAB
ANION GAP SERPL CALC-SCNC: 10 MMOL/L — SIGNIFICANT CHANGE UP (ref 5–17)
BUN SERPL-MCNC: 39 MG/DL — HIGH (ref 7–23)
CALCIUM SERPL-MCNC: 9 MG/DL — SIGNIFICANT CHANGE UP (ref 8.4–10.5)
CHLORIDE SERPL-SCNC: 99 MMOL/L — SIGNIFICANT CHANGE UP (ref 96–108)
CO2 SERPL-SCNC: 31 MMOL/L — SIGNIFICANT CHANGE UP (ref 22–31)
CREAT SERPL-MCNC: 1.55 MG/DL — HIGH (ref 0.5–1.3)
GLUCOSE BLDC GLUCOMTR-MCNC: 104 MG/DL — HIGH (ref 70–99)
GLUCOSE BLDC GLUCOMTR-MCNC: 122 MG/DL — HIGH (ref 70–99)
GLUCOSE BLDC GLUCOMTR-MCNC: 124 MG/DL — HIGH (ref 70–99)
GLUCOSE SERPL-MCNC: 122 MG/DL — HIGH (ref 70–99)
MAGNESIUM SERPL-MCNC: 2.1 MG/DL — SIGNIFICANT CHANGE UP (ref 1.6–2.6)
POTASSIUM SERPL-MCNC: 3.7 MMOL/L — SIGNIFICANT CHANGE UP (ref 3.5–5.3)
POTASSIUM SERPL-SCNC: 3.7 MMOL/L — SIGNIFICANT CHANGE UP (ref 3.5–5.3)
SODIUM SERPL-SCNC: 140 MMOL/L — SIGNIFICANT CHANGE UP (ref 135–145)

## 2019-10-02 PROCEDURE — 93005 ELECTROCARDIOGRAM TRACING: CPT

## 2019-10-02 PROCEDURE — 83036 HEMOGLOBIN GLYCOSYLATED A1C: CPT

## 2019-10-02 PROCEDURE — C1898: CPT

## 2019-10-02 PROCEDURE — 82962 GLUCOSE BLOOD TEST: CPT

## 2019-10-02 PROCEDURE — 85730 THROMBOPLASTIN TIME PARTIAL: CPT

## 2019-10-02 PROCEDURE — 99239 HOSP IP/OBS DSCHRG MGMT >30: CPT

## 2019-10-02 PROCEDURE — 84484 ASSAY OF TROPONIN QUANT: CPT

## 2019-10-02 PROCEDURE — 99233 SBSQ HOSP IP/OBS HIGH 50: CPT | Mod: GC

## 2019-10-02 PROCEDURE — 71045 X-RAY EXAM CHEST 1 VIEW: CPT

## 2019-10-02 PROCEDURE — 96374 THER/PROPH/DIAG INJ IV PUSH: CPT

## 2019-10-02 PROCEDURE — 85027 COMPLETE CBC AUTOMATED: CPT

## 2019-10-02 PROCEDURE — C8929: CPT

## 2019-10-02 PROCEDURE — C1769: CPT

## 2019-10-02 PROCEDURE — 80053 COMPREHEN METABOLIC PANEL: CPT

## 2019-10-02 PROCEDURE — 86850 RBC ANTIBODY SCREEN: CPT

## 2019-10-02 PROCEDURE — 84100 ASSAY OF PHOSPHORUS: CPT

## 2019-10-02 PROCEDURE — 80048 BASIC METABOLIC PNL TOTAL CA: CPT

## 2019-10-02 PROCEDURE — 71046 X-RAY EXAM CHEST 2 VIEWS: CPT

## 2019-10-02 PROCEDURE — 86901 BLOOD TYPING SEROLOGIC RH(D): CPT

## 2019-10-02 PROCEDURE — C1730: CPT

## 2019-10-02 PROCEDURE — 85610 PROTHROMBIN TIME: CPT

## 2019-10-02 PROCEDURE — 86900 BLOOD TYPING SEROLOGIC ABO: CPT

## 2019-10-02 PROCEDURE — 83880 ASSAY OF NATRIURETIC PEPTIDE: CPT

## 2019-10-02 PROCEDURE — C2621: CPT

## 2019-10-02 PROCEDURE — C1887: CPT

## 2019-10-02 PROCEDURE — 99285 EMERGENCY DEPT VISIT HI MDM: CPT | Mod: 25

## 2019-10-02 PROCEDURE — 33208 INSRT HEART PM ATRIAL & VENT: CPT

## 2019-10-02 PROCEDURE — 83735 ASSAY OF MAGNESIUM: CPT

## 2019-10-02 PROCEDURE — 82977 ASSAY OF GGT: CPT

## 2019-10-02 RX ORDER — SPIRONOLACTONE 25 MG/1
25 TABLET, FILM COATED ORAL DAILY
Refills: 0 | Status: DISCONTINUED | OUTPATIENT
Start: 2019-10-03 | End: 2019-10-02

## 2019-10-02 RX ORDER — ISOSORBIDE DINITRATE 5 MG/1
1 TABLET ORAL
Qty: 90 | Refills: 0
Start: 2019-10-02 | End: 2019-10-31

## 2019-10-02 RX ORDER — HYDRALAZINE HCL 50 MG
1 TABLET ORAL
Qty: 90 | Refills: 0
Start: 2019-10-02 | End: 2019-10-31

## 2019-10-02 RX ORDER — SENNA PLUS 8.6 MG/1
2 TABLET ORAL AT BEDTIME
Refills: 0 | Status: DISCONTINUED | OUTPATIENT
Start: 2019-10-02 | End: 2019-10-02

## 2019-10-02 RX ORDER — SENNA PLUS 8.6 MG/1
2 TABLET ORAL
Qty: 0 | Refills: 0 | DISCHARGE
Start: 2019-10-02

## 2019-10-02 RX ORDER — HYDRALAZINE HCL 50 MG
100 TABLET ORAL THREE TIMES A DAY
Refills: 0 | Status: DISCONTINUED | OUTPATIENT
Start: 2019-10-02 | End: 2019-10-02

## 2019-10-02 RX ORDER — ACETAMINOPHEN 500 MG
2 TABLET ORAL
Qty: 0 | Refills: 0 | DISCHARGE
Start: 2019-10-02

## 2019-10-02 RX ORDER — CARVEDILOL PHOSPHATE 80 MG/1
1 CAPSULE, EXTENDED RELEASE ORAL
Qty: 60 | Refills: 0
Start: 2019-10-02 | End: 2019-10-31

## 2019-10-02 RX ORDER — APIXABAN 2.5 MG/1
1 TABLET, FILM COATED ORAL
Qty: 60 | Refills: 0
Start: 2019-10-02 | End: 2019-10-31

## 2019-10-02 RX ORDER — DOCUSATE SODIUM 100 MG
100 CAPSULE ORAL THREE TIMES A DAY
Refills: 0 | Status: DISCONTINUED | OUTPATIENT
Start: 2019-10-02 | End: 2019-10-02

## 2019-10-02 RX ORDER — ATORVASTATIN CALCIUM 80 MG/1
1 TABLET, FILM COATED ORAL
Qty: 30 | Refills: 0
Start: 2019-10-02 | End: 2019-10-31

## 2019-10-02 RX ORDER — SPIRONOLACTONE 25 MG/1
1 TABLET, FILM COATED ORAL
Qty: 30 | Refills: 0
Start: 2019-10-02 | End: 2019-10-31

## 2019-10-02 RX ORDER — FUROSEMIDE 40 MG
80 TABLET ORAL DAILY
Refills: 0 | Status: DISCONTINUED | OUTPATIENT
Start: 2019-10-03 | End: 2019-10-02

## 2019-10-02 RX ORDER — ASPIRIN/CALCIUM CARB/MAGNESIUM 324 MG
1 TABLET ORAL
Qty: 0 | Refills: 0 | DISCHARGE
Start: 2019-10-02

## 2019-10-02 RX ORDER — FUROSEMIDE 40 MG
1 TABLET ORAL
Qty: 30 | Refills: 0
Start: 2019-10-02 | End: 2019-10-31

## 2019-10-02 RX ORDER — DOCUSATE SODIUM 100 MG
2 CAPSULE ORAL
Qty: 0 | Refills: 0 | DISCHARGE
Start: 2019-10-02

## 2019-10-02 RX ORDER — LISINOPRIL 2.5 MG/1
1 TABLET ORAL
Qty: 30 | Refills: 0
Start: 2019-10-02 | End: 2019-10-31

## 2019-10-02 RX ADMIN — Medication 81 MILLIGRAM(S): at 12:08

## 2019-10-02 RX ADMIN — Medication 650 MILLIGRAM(S): at 07:30

## 2019-10-02 RX ADMIN — Medication 75 MILLIGRAM(S): at 05:02

## 2019-10-02 RX ADMIN — SPIRONOLACTONE 12.5 MILLIGRAM(S): 25 TABLET, FILM COATED ORAL at 05:02

## 2019-10-02 RX ADMIN — CARVEDILOL PHOSPHATE 6.25 MILLIGRAM(S): 80 CAPSULE, EXTENDED RELEASE ORAL at 05:01

## 2019-10-02 RX ADMIN — APIXABAN 5 MILLIGRAM(S): 2.5 TABLET, FILM COATED ORAL at 05:01

## 2019-10-02 RX ADMIN — Medication 650 MILLIGRAM(S): at 05:02

## 2019-10-02 RX ADMIN — APIXABAN 5 MILLIGRAM(S): 2.5 TABLET, FILM COATED ORAL at 17:29

## 2019-10-02 RX ADMIN — CARVEDILOL PHOSPHATE 6.25 MILLIGRAM(S): 80 CAPSULE, EXTENDED RELEASE ORAL at 17:29

## 2019-10-02 RX ADMIN — Medication 75 MILLIGRAM(S): at 12:10

## 2019-10-02 RX ADMIN — Medication 80 MILLIGRAM(S): at 05:01

## 2019-10-02 RX ADMIN — LISINOPRIL 40 MILLIGRAM(S): 2.5 TABLET ORAL at 05:01

## 2019-10-02 RX ADMIN — ISOSORBIDE DINITRATE 40 MILLIGRAM(S): 5 TABLET ORAL at 05:01

## 2019-10-02 RX ADMIN — ISOSORBIDE DINITRATE 40 MILLIGRAM(S): 5 TABLET ORAL at 12:10

## 2019-10-02 NOTE — DISCHARGE NOTE PROVIDER - HOSPITAL COURSE
9 M with PMH of HTN, T2DM, HFrEF with EF ~45%, chronic gout, complete heart block s/p PPM, non-Hodgkin lymphoma s/p chemo in 2004 (currently in remission), CKD stage II who p/w dyspnea x 3 days, admitted for acute on chronic decompensated heart failure. CHF excerbation likely          2/2 medication non-compliance. BNP 05517. s/p micraPPM for complete heart block - - c/w 80mg Lasix iv BID; seen by heart  failure team. Also noted Elevated alkaline phosphatase with  mildly elevated, tbili wnl; GTT mildly elevated - no abdominal pain - tolerating diet ; has abdominal and pelvic ascites likely due to HF. Diuresed well. Currently euvolemic; switched to PO lasix ; EP consulted for chronic RV pacing consideration for device upgrade to CRT-P. Now S/p CRT-P upgrade 9/30/19. severe MR on TE 5/2019; plan for mitralclip  as outpatient if MR remains severe after CRT-p. Pt to follow up outptaient. Started on ELiquis for new onset aflutter.Patient to follow up in a 2-3 weeks for KUSHAL/DCCV outpatient. Booking office notified to schedule and call patient with appointment. Pt does have cognitive impairment, suspect multifactorial with overall poor cardiac health, possible contribution of old infarct, poor sleep, and possibly a degenerative component.  Neuro evaluated;  seems stable since prior eval in May. stated  no need for neurodiagnostics inpatient tests on this admission; Advised  follow up for  cognitive evaluation in outpt setting    Discharged home with home care.

## 2019-10-02 NOTE — PROGRESS NOTE ADULT - PROVIDER SPECIALTY LIST ADULT
Electrophysiology
Electrophysiology
Heart Failure
Hospitalist
Neurology
Neurology
Electrophysiology
Heart Failure
Hospitalist
Hospitalist

## 2019-10-02 NOTE — PROGRESS NOTE ADULT - ASSESSMENT
69 M with PMH of HTN, T2DM, HFrEF with EF ~45%, complete heart block s/p PPM, non-Hodgkin lymphoma s/p chemo in 2004 (currently in remission), CKD stage II who p/w dyspnea x 3 days found to have HF exacerbation. Neuro consulted for confusion.  Evaluation similar to exam notated by neurology in May of 2019.  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    Pt does have cognitive impairment, suspect multifactorial with overall poor cardiac health, possible contribution of old infarct, poor sleep, and possibly a degenerative component.  Neuro eval seems stable since prior eval in May.    s/p PPM upgrade     - no need for neurodiagnostics inpatient tests on this admission  - can follow with us for cognitive evaluation in outpt setting  - no neuro objection to dc   d/w pt at bedside

## 2019-10-02 NOTE — PROGRESS NOTE ADULT - ATTENDING COMMENTS
70 YO M with a history of ACC/AHA Stage C HF with borderline EF (LV 5.5 cm, EF 45%) likely from hypertensive cardiomyopathy with severe functional MR, CHB s/p micra PPM 5/2019, and CKD III (baseline Cr 1.5), NHL with history of doxarubicin exposure presenting with acute decompensated heart failure. He was roughly 30 lbs above his last discharge weight and the trigger is not taking his medications for 3 months because he felt well and did not refill prescriptions. He was also found to have atrial flutter which is a new diagnosis. He has since been diuresed 40 lbs this admission and underwent CRT-P upgrade from Micra due to high pacing burden. He is now euvolemic    Review of pertinent studies reveals:  EKG on admission: atrial flutter, underlying RV pacing, PVCs   TTE 9/2019: LV 5.7 cm, EF 40%, severe functional MR, estimated PASP 90 mmHg   TTE 5/2019: LV 5.5 cm, EF 45%, severe functional MR, moderate TR  RHC 5/2019: RA 17, PA 75/36 (48), PCWP 27, Aquiles CO/CI 4.9/2.2  LHC 5/2019: non-obstructive CAD aside from borderline significant lesions in small vessels   KUSHAL 5/2019: dilated annulus with tethered leaflets and severe fuctional MR     The plan for today is as follows:  - Continue lisinopril 40 mg daily and carvedilol 6.25 mg BID (avoiding rapid uptitration of BB). Increase hydral to 100 mg TID and spironlactone to 25 mg daily, continue isordil at 40 mg TID. As outpatient will switch to Entresto and uptitrate BB  - Switch to PO diuretics, would discharge on lasix 80 mg daily  - Anticoagulation for AFl, planned for outpatient DCCV  - Continue ASA/statin for CAD  - Elevated PA pressures on TTE likely related to severely elevated filling pressures at time of echo, can be repeated as outpatient  - Would eventually consider MitraClip evaluation if MR remains severe with CRT pacing and medical optimization   - I discussed that I wanted 1 week HF NP f/u and then f/u in clinic with me 2 weeks after since he needs significant medical optimization however he states he is going to [place] for a month. He promised he would schedule appointment with me when he returns. Extensive counseling given today about the nature of HF and importance of taking and renewing his medications.
My overall assessment is that this is a 70 YO M with a history of ACC/AHA Stage C HF with borderline EF (LV 5.5 cm, EF 45%), severe functional MR, CHB s/p micra PPM 5/2019, and CKD III (baseline Cr 1.5), NHL with history of doxarubicin exposure presenting with acute decompensated heart failure. He is roughly 30 lbs above his discharge weight and the trigger is not taking his medications for 3 months because he felt well and did not refill prescriptions. He was also found to have atrial flutter which is a new diagnosis. In addition to diuresis he has other avenues of improving his HF trajectory (MitraClip, CRT + restoring sinus rhythm) however he is unlikely to be willing to stay long as he states he has business to attend to.     Review of pertinent studies reveals:  EKG on admission: atrial flutter, underlying RV pacing, PVCs   TTE 5/2019: LV 5.5 cm, EF 45%, severe functional MR, moderate TR  RHC 5/2019: RA 17, PA 75/36 (48), PCWP 27, Aquiles CO/CI 4.9/2.2  LHC 5/2019: non-obstructive CAD aside from borderline significant lesions in small vessels     The plan for today is as follows:  - Optimize GDMT as above  - IV diuresis  - a/c for AFl  - start ASA/statin for CAD  - Would ultimately benefit from CRT upgrade, evaluation for MitraClip after euvolemic, and possibly DCCV however he needs medical and volume optimization first and he states he is unlikely to stay long due to outstanding matters     Please feel free to call me with any questions: [phone]
Dr. [facility]  [phone]
Dr. HANA[facility]azar[facility] Healthcare[facility]  [phone]
Dr. [facility]  [phone]
Dr. HANANE Salazar[facility] Healthcare[facility]  [phone]

## 2019-10-02 NOTE — PROGRESS NOTE ADULT - SUBJECTIVE AND OBJECTIVE BOX
Doing well this morning. Denies CP, SOB, palp, MOISES.    MEDICATIONS:  apixaban 5 milliGRAM(s) Oral every 12 hours  aspirin enteric coated 81 milliGRAM(s) Oral daily  carvedilol 6.25 milliGRAM(s) Oral every 12 hours  hydrALAZINE 100 milliGRAM(s) Oral three times a day  isosorbide   dinitrate Tablet (ISORDIL) 40 milliGRAM(s) Oral three times a day  lisinopril 40 milliGRAM(s) Oral daily  acetaminophen   Tablet .. 650 milliGRAM(s) Oral every 6 hours PRN  atorvastatin 40 milliGRAM(s) Oral at bedtime  dextrose 40% Gel 15 Gram(s) Oral once PRN  dextrose 50% Injectable 12.5 Gram(s) IV Push once  dextrose 50% Injectable 25 Gram(s) IV Push once  dextrose 50% Injectable 25 Gram(s) IV Push once  glucagon  Injectable 1 milliGRAM(s) IntraMuscular once PRN  insulin lispro (HumaLOG) corrective regimen sliding scale   SubCutaneous Before meals and at bedtime  dextrose 5%. 1000 milliLiter(s) IV Continuous <Continuous>      REVIEW OF SYSTEMS:    CONSTITUTIONAL: No weakness, fevers or chills  EYES/ENT: No visual changes;  No dysphagia  RESPIRATORY: No cough, wheezing, hemoptysis; No shortness of breath  CARDIOVASCULAR: No chest pain or palpitations; No lower extremity edema  GASTROINTESTINAL: No abdominal or epigastric pain. No nausea, vomiting, or hematemesis  GENITOURINARY: No dysuria, frequency or hematuria  NEUROLOGICAL: No numbness or weakness  SKIN: No itching, burning, rashes, or lesions   HEME: No bleeding or bruising  MSK: No joint pains or muscle pains  All other review of systems is negative unless indicated above.    PHYSICAL EXAM:  T(C): 36.6 (10-02-19 @ 04:35), Max: 36.7 (10-01-19 @ 20:43)  HR: 74 (10-02-19 @ 12:22) (70 - 76)  BP: 144/70 (10-02-19 @ 12:22) (114/62 - 167/70)  RR: 18 (10-02-19 @ 04:35) (17 - 18)  SpO2: 95% (10-02-19 @ 04:35) (95% - 97%)  Wt(kg): --  I&O's Summary    01 Oct 2019 07:01  -  02 Oct 2019 07:00  --------------------------------------------------------  IN: 960 mL / OUT: 500 mL / NET: 460 mL    02 Oct 2019 07:01  -  02 Oct 2019 13:22  --------------------------------------------------------  IN: 0 mL / OUT: 100 mL / NET: -100 mL        Appearance: Normal	  HEENT:   Normal oral mucosa  Cardiovascular: Normal S1 S2, No JVD, No murmurs, No edema  Respiratory: Lungs clear to auscultation	  Psychiatry: A & O x 3, Mood & affect appropriate  Gastrointestinal:  Soft, Non-tender, + BS	  Skin: No rashes, No ecchymoses, No cyanosis	  Neurologic: Non-focal  Extremities: Normal range of motion, No clubbing, cyanosis        LABS:	 	    CBC Full  -  ( 01 Oct 2019 07:16 )  WBC Count : 7.5 K/uL  Hemoglobin : 11.8 g/dL  Hematocrit : 38.7 %  Platelet Count - Automated : 178 K/uL  Mean Cell Volume : 88.1 fl  Mean Cell Hemoglobin : 26.8 pg  Mean Cell Hemoglobin Concentration : 30.4 gm/dL  Auto Neutrophil # : x  Auto Lymphocyte # : x  Auto Monocyte # : x  Auto Eosinophil # : x  Auto Basophil # : x  Auto Neutrophil % : x  Auto Lymphocyte % : x  Auto Monocyte % : x  Auto Eosinophil % : x  Auto Basophil % : x    10-02    140  |  99  |  39<H>  ----------------------------<  122<H>  3.7   |  31  |  1.55<H>  10-01    136  |  96  |  31<H>  ----------------------------<  108<H>  3.6   |  30  |  1.56<H>    Ca    9.0      02 Oct 2019 06:14  Ca    8.9      01 Oct 2019 07:16  Mg     2.1     10-02

## 2019-10-02 NOTE — DISCHARGE NOTE PROVIDER - NSDCCPCAREPLAN_GEN_ALL_CORE_FT
PRINCIPAL DISCHARGE DIAGNOSIS  Diagnosis: Biventricular heart failure with reduced left ventricular function  Assessment and Plan of Treatment: Weigh yourself daily.  If you gain 3lbs in 3 days, or 5lbs in a week call your Health Care Provider.  Do not eat or drink foods containing more than 2000mg of salt (sodium) in your diet every day.  Call your Health Care Provider if you have any swelling or increased swelling in your feet, ankles, and/or stomach.  Take all of your medication as directed.  If you become dizzy call your Health Care Provider.        SECONDARY DISCHARGE DIAGNOSES  Diagnosis: S/P placement of cardiac pacemaker  Assessment and Plan of Treatment: Follow up with EPS on 10/11/19    Diagnosis: Atrial flutter  Assessment and Plan of Treatment: continue ELiquis      Diagnosis: Severe mitral regurgitation  Assessment and Plan of Treatment: Follow up with heart failure specialist    Diagnosis: DM type 2 (diabetes mellitus, type 2)  Assessment and Plan of Treatment: You can stop Glimiperide  Your diabetes is controlled without medications  control your diet by avoiding high concentrated sugars    Diagnosis: CKD (chronic kidney disease), stage III  Assessment and Plan of Treatment: Avoid taking (NSAIDs) - (ex: Ibuprofen, Advil, Celebrex, Naprosyn)  Avoid taking any nephrotoxic agents (can harm kidneys) - Intravenous contrast for diagnostic testing, combination cold medications.  Have all medications adjusted for your renal function by your Health Care Provider.  Blood pressure control is important.  Take all medication as prescribed.      Diagnosis: Elevated alkaline phosphatase level  Assessment and Plan of Treatment: Follow up with PMD for repeat labs in 1 -2 weeks

## 2019-10-02 NOTE — PROGRESS NOTE ADULT - REASON FOR ADMISSION
SOB, LE edema

## 2019-10-02 NOTE — DISCHARGE NOTE NURSING/CASE MANAGEMENT/SOCIAL WORK - PATIENT PORTAL LINK FT
You can access the FollowMyHealth Patient Portal offered by [facility] by registering at the following website: [email or web address]. By joining [facility]’s FollowMyHealth portal, you will also be able to view your health information using other applications (apps) compatible with our system.

## 2019-10-02 NOTE — DISCHARGE NOTE PROVIDER - NSDCFUSCHEDAPPT_GEN_ALL_CORE_FT
NIK GRIMM ; 10/11/2019 ; NPP Cardio Electro [address] NIK Reed ; 10/24/2019 ; NP Cardio Echo [address] NIK Reed ; 11/11/2019 ; NPP Endocrin [address] [facility] NIK GRIMM ; 10/11/2019 ; NPP Cardio Electro [address] NIK Reed ; 10/24/2019 ; NP Cardio Echo [address] NIK Reed ; 11/11/2019 ; NPP Endocrin [address] [facility] NIK GRIMM ; 10/11/2019 ; [facility]  NIK GRIMM ; 10/11/2019 ; [facility] Cardio [address] [facility]  NIK GRIMM ; 10/24/2019 ; [facility] Cardio Echo [address] NIK Reed ; 11/11/2019 ; [facility] Endocrin [address] [facility]

## 2019-10-02 NOTE — CHART NOTE - NSCHARTNOTEFT_GEN_A_CORE
patient seen and examined. no acute complaints     69M h/o HTN, T2DM, HFrEF with EF ~45%, chronic gout, complete heart block s/p PPM, non-Hodgkin lymphoma s/p chemo in 2004 (currently in remission), CKD stage II who p/w dyspnea x 3 days, admitted for acute on chronic decompensated heart failure.  patient treated with IV lasix with good diuresis, HF medications adjusted. patient seen by EP and had PPM upgrade 9/30. TTE ef 40% with diffuse hypokinesis with functional mitral regurgitation, probably severe and severe pulmonary hypertension. NSVT on telemetry and restarted on coreg. patient transitioned to oral lasix per HF and recommended for discharge with close outpatient follow up. patient also recommended for follow up regarding mitral clip. patient had hga1c 6.0 on admission and noncompliant with medications. patient to continue diet control at home. CKD 3 at baseline. patient was evaluated by neurology inpatient for some memory loss with possible early dementia. patient recommended for no inpatient intervention and outpatient follow up with them. discussed with daughter importance of compliance with medications. daughter will be monitoring medications and encouraging compliance at home. Patient had elevated alkaline phosphatase level had workup on previous admission. patient to follow up LFTs as outpatient.    discharge time - 45 minutes  Dr. M. Luke  Medicine Hospitalist  [phone]

## 2019-10-02 NOTE — DISCHARGE NOTE PROVIDER - PROVIDER TOKENS
PROVIDER:[TOKEN:[[id number]]],PROVIDER:[TOKEN:[[id number]]],PROVIDER:[TOKEN:[[id number]]]

## 2019-10-02 NOTE — DISCHARGE NOTE PROVIDER - NSDCFUADDAPPT_GEN_ALL_CORE_FT
Follow up with EPS on 10/11/19  Follow up with cardiology on 10/24/19  Follow up with PMD in 1 week  Folllow up with Neurology

## 2019-10-02 NOTE — PROGRESS NOTE ADULT - SUBJECTIVE AND OBJECTIVE BOX
Admitting Diagnosis:  Heart failure (I50.9): HEART FAILURE, UNSPECIFIED      HPI:  This is a 69y year old Male with the below past medical history who presents with the chief complaint of shortness of breath, consult for confusion.  He has a hx of HTN, T2DM, HFrEF with EF ~45%, complete heart block s/p PPM, non-Hodgkin lymphoma s/p chemo in 2004 (currently in remission), CKD stage II who p/w dyspnea x 3 days. Patient has been off all of his medications since July d/t lack of PCP. He initially developed dyspnea and ARGUETA a week ago. Presented to the ED on 9/18, given 80mg Lasix, then sent home. Reports that his symptoms improved mildly then returned. He has been noting dyspnea along withe worsening LE edema x 3 days. Endorses to unable to sleep at night, with associated dyspnea on exertion as well.     Pt has been noted to have confusion and forgetfulness by heart failure team and family.  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  At that time he showed poor orientation and insight.  advised to have outpt neurocog eval.   Pt denies any issues at this time.    s/p PPM upgrade, has chest wall discomfort    Past Medical History:  Pleural effusion (J90)  Moderate mitral regurgitation (I34.0)  Systolic heart failure (I50.20)  Rhinitis, allergic (J30.9)  Essential hypertension (I10)  DM type 2 (diabetes mellitus, type 2) (E11.9): Never on insulin  Diabetes Mellitus (250.00)  Non-Hodgkins Lymphoma (202.80): In [facility] for &gt; 10 years      Past Surgical History:  Cardiac pacemaker (Z95.0)  Non-Hodgkin lymphoma (C85.90): h/o axillary dissection  No significant past surgical history ([id number])      Social History:  No toxic habits    Family History:  FAMILY HISTORY:  Family history of non-Hodgkin's lymphoma (Sibling)  Family history of CHF (congestive heart failure): Mother      Allergies:  No Known Allergies      ROS:  Constitutional: Patient offers no complaints of fevers or significant weight loss  Ears, Nose, Mouth and Throat: The patient presents with no abnormalities of the head, ears, eyes, nose or throat  Skin: Patient offers no concerns of new rashes or lesions  Respiratory: The patient presents with no abnormalities of the respiratory tract  Cardiovascular: The patient presents with no cardiac abnormalities  Gastrointestinal: The patient presents with no abnormalities of the GI system  Genitourinary: The patient presents with no dysuria, hematuria or frequent urination  Neurological: See HPI  Endocrine: Patient offers no complaints of excessive thirst, urination, or heat/cold intolerance    Advanced care planning reviewed and noted in the chart.    Medications:  acetaminophen   Tablet .. 650 milliGRAM(s) Oral every 6 hours PRN  apixaban 5 milliGRAM(s) Oral every 12 hours  aspirin enteric coated 81 milliGRAM(s) Oral daily  atorvastatin 40 milliGRAM(s) Oral at bedtime  carvedilol 6.25 milliGRAM(s) Oral every 12 hours  dextrose 40% Gel 15 Gram(s) Oral once PRN  dextrose 5%. 1000 milliLiter(s) IV Continuous <Continuous>  dextrose 50% Injectable 12.5 Gram(s) IV Push once  dextrose 50% Injectable 25 Gram(s) IV Push once  dextrose 50% Injectable 25 Gram(s) IV Push once  furosemide   Injectable 80 milliGRAM(s) IV Push two times a day  glucagon  Injectable 1 milliGRAM(s) IntraMuscular once PRN  hydrALAZINE 75 milliGRAM(s) Oral three times a day  influenza   Vaccine 0.5 milliLiter(s) IntraMuscular once  insulin lispro (HumaLOG) corrective regimen sliding scale   SubCutaneous Before meals and at bedtime  isosorbide   dinitrate Tablet (ISORDIL) 40 milliGRAM(s) Oral three times a day  lisinopril 40 milliGRAM(s) Oral daily  spironolactone 12.5 milliGRAM(s) Oral daily      Labs:  CBC Full  -  ( 01 Oct 2019 07:16 )  WBC Count : 7.5 K/uL  RBC Count : 4.39 M/uL  Hemoglobin : 11.8 g/dL  Hematocrit : 38.7 %  Platelet Count - Automated : 178 K/uL  Mean Cell Volume : 88.1 fl  Mean Cell Hemoglobin : 26.8 pg  Mean Cell Hemoglobin Concentration : 30.4 gm/dL  Auto Neutrophil # : x  Auto Lymphocyte # : x  Auto Monocyte # : x  Auto Eosinophil # : x  Auto Basophil # : x  Auto Neutrophil % : x  Auto Lymphocyte % : x  Auto Monocyte % : x  Auto Eosinophil % : x  Auto Basophil % : x    10-02    140  |  99  |  39<H>  ----------------------------<  122<H>  3.7   |  31  |  1.55<H>    Ca    9.0      02 Oct 2019 06:14      CAPILLARY BLOOD GLUCOSE      POCT Blood Glucose.: 104 mg/dL (02 Oct 2019 08:54)  POCT Blood Glucose.: 154 mg/dL (01 Oct 2019 21:35)  POCT Blood Glucose.: 132 mg/dL (01 Oct 2019 17:54)  POCT Blood Glucose.: 140 mg/dL (01 Oct 2019 12:44)              Vitals:  Vital Signs Last 24 Hrs  T(C): 36.6 (02 Oct 2019 04:35), Max: 36.7 (01 Oct 2019 20:43)  T(F): 97.8 (02 Oct 2019 04:35), Max: 98 (01 Oct 2019 20:43)  HR: 73 (02 Oct 2019 04:35) (70 - 76)  BP: 167/70 (02 Oct 2019 04:35) (114/62 - 167/70)  BP(mean): --  RR: 18 (02 Oct 2019 04:35) (17 - 18)  SpO2: 95% (02 Oct 2019 04:35) (95% - 97%)    NEUROLOGICAL EXAM:    Mental status: Awake, alert, and in no apparent distress. Oriented to person. place and time. no confusion observed. Language function is normal. Recent memory, digit span and concentration were normal.     Cranial Nerves: Pupils were equal, round, reactive to light. Extraocular movements were intact. Visual field were full. . Facial sensation was intact to light touch. There was no facial asymmetry. The palate was upgoing symmetrically and tongue was midline. Hearing acuity was intact to finger rub AU. Shoulder shrug was full bilaterally    Motor exam: Bulk and tone were normal. Strength was 5/5 in all four extremities. Fine finger movements were symmetric and normal. There was no pronator drift    Reflexes: 2+ in the bilateral upper extremities. 2+ in the bilateral lower extremities. Toes were downgoing bilaterally.     Sensation: Intact to light touch, temperature, vibration and proprioception.     Coordination: Finger-nose-finger was without dysmetria.     Gait: deferred

## 2019-10-02 NOTE — DISCHARGE NOTE PROVIDER - CARE PROVIDER_API CALL
Ronal Franco)  Cardiac Electrophysiology; Cardiovascular Disease; Internal Medicine  [address] [place] [address]  Phone: [phone]  Fax: [phone]  Follow Up Time:     Patricia Lira)  Cardiology; Internal Medicine  [address], [address] [place] [address]  Phone: [phone]  Fax: [phone]  Follow Up Time:     Jason Huston)  Electrodiagnostic Medicine; Neurology  [address] [facility], [address]  [place] [address]  Phone: [phone]  Fax: [phone]  Follow Up Time:

## 2019-10-02 NOTE — PROGRESS NOTE ADULT - ASSESSMENT
ASSESSMENT/PLAN: 	  69 year old man with prior NHL (treated with R-CHOP 2004 and BR in 2010), chronic HFrEF (likely 2/2 Doxorubicin, EF:45%, decreased RVSF), CHB s/p micra PPM 5/2019, severe MR pending mitraclip evaluation, now p/w ADHF due to medication non-compliance. S/p IV diuresis, now euvolemic.    Problem/Plan - 1:  ·  Problem: Acute on chronic systolic CHF.  Plan:   -switch to PO lasix 80mg daily  -increase hydralazine to 100mg TID, spironolactone to 25mg daily  -c/w lisinopril 40mg daily  - c/w coreg 6.25mg BID  -plan to uptitrate coreg and entresto as outpatient  -okay to d/c from HF standpoint  -pt leaving for [place] after d/c, gave clinic number - he will schedule f/u once he returns    Problem/Plan - 2:  ·  Problem: Severe mitral regurgitation.  Plan:   -consider mitraclip if MR remains severe s/p CRT-P    Problem/Plan - 3:  ·  Problem: Stage 3 chronic kidney disease.  Plan: SCr slightly elevated today, monitor    Problem/Plan - 4:  ·  Problem: Aflutter with CHB s/p PPM.  Plan:   - c/w apixaban  - would benefit from CRT-P given high pacing requirements; may help with improving EF    Problem/Plan - 5:  ·  Problem: Non-obstructive CAD.  Plan:   - last cath 5/2019, asymptomatic  - c/w asa 81mg daily, atorvastatin 40mg daily      Sonya Bales MD  Cardiology Fellow   [phone], M-F 7:30A-5P    All Cardiology service information can be found on [email or web address], password: [email or web address].

## 2019-10-08 NOTE — DISCUSSION/SUMMARY
[Home] : patient was discharged to home [FreeTextEntry1] : Spoke with pt f/u s/p hospitalization, pt stated he is doing well, declined f/u visit appoint at this time.

## 2019-10-09 RX ORDER — ISOSORBIDE DINITRATE 5 MG/1
1 TABLET ORAL
Qty: 90 | Refills: 0
Start: 2019-10-09 | End: 2019-11-07

## 2019-10-11 ENCOUNTER — NON-APPOINTMENT (OUTPATIENT)
Age: 69
End: 2019-10-11

## 2019-10-11 ENCOUNTER — APPOINTMENT (OUTPATIENT)
Dept: INTERNAL MEDICINE | Facility: CLINIC | Age: 69
End: 2019-10-11
Payer: MEDICARE

## 2019-10-11 ENCOUNTER — APPOINTMENT (OUTPATIENT)
Dept: ELECTROPHYSIOLOGY | Facility: CLINIC | Age: 69
End: 2019-10-11

## 2019-10-11 ENCOUNTER — APPOINTMENT (OUTPATIENT)
Dept: CARDIOLOGY | Facility: CLINIC | Age: 69
End: 2019-10-11
Payer: MEDICARE

## 2019-10-11 VITALS
OXYGEN SATURATION: 98 % | TEMPERATURE: 98.1 F | WEIGHT: 166 LBS | HEIGHT: 72 IN | RESPIRATION RATE: 17 BRPM | DIASTOLIC BLOOD PRESSURE: 66 MMHG | SYSTOLIC BLOOD PRESSURE: 130 MMHG | BODY MASS INDEX: 22.48 KG/M2 | HEART RATE: 72 BPM

## 2019-10-11 PROCEDURE — 93000 ELECTROCARDIOGRAM COMPLETE: CPT

## 2019-10-11 PROCEDURE — 99215 OFFICE O/P EST HI 40 MIN: CPT

## 2019-10-11 RX ORDER — ALLOPURINOL 300 MG/1
300 TABLET ORAL
Refills: 0 | Status: DISCONTINUED | COMMUNITY
End: 2019-10-11

## 2019-10-11 RX ORDER — GLIMEPIRIDE 2 MG/1
2 TABLET ORAL DAILY
Qty: 30 | Refills: 6 | Status: DISCONTINUED | COMMUNITY
End: 2019-10-11

## 2019-10-11 RX ORDER — CARVEDILOL 12.5 MG/1
12.5 TABLET, FILM COATED ORAL
Refills: 0 | Status: DISCONTINUED | COMMUNITY
End: 2019-10-11

## 2019-10-11 RX ORDER — IRBESARTAN 150 MG/1
150 TABLET, FILM COATED ORAL
Refills: 0 | Status: DISCONTINUED | COMMUNITY
End: 2019-10-11

## 2019-10-11 RX ORDER — FUROSEMIDE 20 MG/1
20 TABLET ORAL
Qty: 60 | Refills: 0 | Status: DISCONTINUED | COMMUNITY
Start: 2019-05-07 | End: 2019-10-11

## 2019-10-11 RX ORDER — COLCHICINE 0.6 MG/1
0.6 TABLET ORAL
Refills: 0 | Status: DISCONTINUED | COMMUNITY
End: 2019-10-11

## 2019-10-11 NOTE — REASON FOR VISIT
[Initial Evaluation] : an initial evaluation of [Cardiomyopathy] : cardiomyopathy [Heart Failure] : congestive heart failure

## 2019-10-11 NOTE — REVIEW OF SYSTEMS
[Negative] : Heme/Lymph [Leg Claudication] : no intermittent leg claudication [Palpitations] : no palpitations [Lower Ext Edema] : no extremity edema

## 2019-10-11 NOTE — PHYSICAL EXAM
[General Appearance - Well Developed] : well developed [Normal Appearance] : normal appearance [Well Groomed] : well groomed [No Deformities] : no deformities [General Appearance - Well Nourished] : well nourished [General Appearance - In No Acute Distress] : no acute distress [Normal Conjunctiva] : the conjunctiva exhibited no abnormalities [Eyelids - No Xanthelasma] : the eyelids demonstrated no xanthelasmas [Normal Oral Mucosa] : normal oral mucosa [No Oral Cyanosis] : no oral cyanosis [No Oral Pallor] : no oral pallor [Normal Rate] : normal [JVD Elevated _____cm] : JVD elevated [unfilled] ~U cm above clavicle [Normal S2] : normal S2 [Rhythm Regular] : regular [Normal S1] : normal S1 [No Gallop] : no gallop heard [II] : a grade 2 [LSB] : the murmur was transmitted to the LSB [Holosystolic] : holosystolic [2+] : left 2+ [1+] : left 1+ [0] : right 0 [No Pitting Edema] : no pitting edema present [No Abnormalities] : the abdominal aorta was not enlarged and no bruit was heard [Respiration, Rhythm And Depth] : normal respiratory rhythm and effort [Exaggerated Use Of Accessory Muscles For Inspiration] : no accessory muscle use [Auscultation Breath Sounds / Voice Sounds] : lungs were clear to auscultation bilaterally [Abdomen Soft] : soft [Abdomen Tenderness] : non-tender [Abdomen Mass (___ Cm)] : no abdominal mass palpated [Gait - Sufficient For Exercise Testing] : the gait was sufficient for exercise testing [Abnormal Walk] : normal gait [Cyanosis, Localized] : no localized cyanosis [Nail Clubbing] : no clubbing of the fingernails [Petechial Hemorrhages (___cm)] : no petechial hemorrhages [] : no rash [Skin Color & Pigmentation] : normal skin color and pigmentation [No Venous Stasis] : no venous stasis [No Skin Ulcers] : no skin ulcer [Skin Lesions] : no skin lesions [No Xanthoma] : no  xanthoma was observed [Oriented To Time, Place, And Person] : oriented to person, place, and time [Mood] : the mood was normal [No Anxiety] : not feeling anxious [Affect] : the affect was normal [Right Carotid Bruit] : no bruit heard over the right carotid [Left Carotid Bruit] : no bruit heard over the left carotid

## 2019-10-11 NOTE — DISCUSSION/SUMMARY
[Anticoagulation] : anticoagulation [Cardiomyopathy] : cardiomyopathy [Chronic Systolic Heart Failure] : chronic systolic congestive heart failure [Hypertensive Cardiomyopathy] : hypertensive cardiomyopathy [Patient] : the patient [Coronary Artery Disease] : coronary artery disease [Stable] : stable [Severe Mitral Regurgitation] : severe mitral regurgitation [None] : none [Dilated Annulus] : dilated annulus [Compensated] : compensated [de-identified] : on aspirin and statin [de-identified] : atrial flutter [FreeTextEntry2] : and daughter

## 2019-10-11 NOTE — HISTORY OF PRESENT ILLNESS
[FreeTextEntry1] : Mr. Lon Skaggs is a 69 year old man with a history of ACC/AHA Stage C HF with borderline EF (LV 5.5 cm, EF 45%) likely from hypertensive cardiomyopathy with severe functional MR, CHB led to Micra PPM 5/2019, and CKD III (baseline Cr 1.5), NHL with history of doxarubicin exposure presenting with acute decompensated heart failure. He was roughly 30 lbs above his last discharge weight and the trigger is not taking his medications for 3 months because he felt well and did not refill prescriptions. He was also found to have atrial flutter which is a new diagnosis. He has since been diuresed 40 lbs this admission and underwent CRT-P upgrade from Micra due to high pacing burden. He is now euvolemic. Since discharge has felt well, denies dyspnea, orthopnea, weight gain.

## 2019-10-12 ENCOUNTER — APPOINTMENT (OUTPATIENT)
Dept: INTERNAL MEDICINE | Facility: CLINIC | Age: 69
End: 2019-10-12
Payer: MEDICARE

## 2019-10-12 VITALS
HEART RATE: 90 BPM | BODY MASS INDEX: 22.53 KG/M2 | TEMPERATURE: 97.1 F | HEIGHT: 73 IN | DIASTOLIC BLOOD PRESSURE: 80 MMHG | WEIGHT: 170 LBS | SYSTOLIC BLOOD PRESSURE: 150 MMHG | OXYGEN SATURATION: 98 %

## 2019-10-12 PROCEDURE — 99213 OFFICE O/P EST LOW 20 MIN: CPT

## 2019-10-16 ENCOUNTER — MEDICATION RENEWAL (OUTPATIENT)
Age: 69
End: 2019-10-16

## 2019-10-24 ENCOUNTER — APPOINTMENT (OUTPATIENT)
Dept: CV DIAGNOSITCS | Facility: HOSPITAL | Age: 69
End: 2019-10-24

## 2019-11-07 ENCOUNTER — APPOINTMENT (OUTPATIENT)
Dept: CV DIAGNOSITCS | Facility: HOSPITAL | Age: 69
End: 2019-11-07

## 2019-11-07 ENCOUNTER — OUTPATIENT (OUTPATIENT)
Dept: OUTPATIENT SERVICES | Facility: HOSPITAL | Age: 69
LOS: 1 days | End: 2019-11-07
Payer: MEDICARE

## 2019-11-07 VITALS
SYSTOLIC BLOOD PRESSURE: 172 MMHG | HEIGHT: 72 IN | DIASTOLIC BLOOD PRESSURE: 83 MMHG | OXYGEN SATURATION: 98 % | HEART RATE: 72 BPM | WEIGHT: 171.96 LBS

## 2019-11-07 DIAGNOSIS — C85.90 NON-HODGKIN LYMPHOMA, UNSPECIFIED, UNSPECIFIED SITE: Chronic | ICD-10-CM

## 2019-11-07 DIAGNOSIS — I48.91 UNSPECIFIED ATRIAL FIBRILLATION: ICD-10-CM

## 2019-11-07 DIAGNOSIS — Z95.0 PRESENCE OF CARDIAC PACEMAKER: Chronic | ICD-10-CM

## 2019-11-07 LAB
ANION GAP SERPL CALC-SCNC: 14 MMOL/L — SIGNIFICANT CHANGE UP (ref 5–17)
APTT BLD: 37.6 SEC — HIGH (ref 27.5–36.3)
BUN SERPL-MCNC: 43 MG/DL — HIGH (ref 7–23)
CALCIUM SERPL-MCNC: 9.7 MG/DL — SIGNIFICANT CHANGE UP (ref 8.4–10.5)
CHLORIDE SERPL-SCNC: 101 MMOL/L — SIGNIFICANT CHANGE UP (ref 96–108)
CO2 SERPL-SCNC: 25 MMOL/L — SIGNIFICANT CHANGE UP (ref 22–31)
CREAT SERPL-MCNC: 1.84 MG/DL — HIGH (ref 0.5–1.3)
GLUCOSE SERPL-MCNC: 97 MG/DL — SIGNIFICANT CHANGE UP (ref 70–99)
HCT VFR BLD CALC: 34.3 % — LOW (ref 39–50)
HGB BLD-MCNC: 10.9 G/DL — LOW (ref 13–17)
INR BLD: 1.59 RATIO — HIGH (ref 0.88–1.16)
MCHC RBC-ENTMCNC: 28.3 PG — SIGNIFICANT CHANGE UP (ref 27–34)
MCHC RBC-ENTMCNC: 31.8 GM/DL — LOW (ref 32–36)
MCV RBC AUTO: 89.1 FL — SIGNIFICANT CHANGE UP (ref 80–100)
NRBC # BLD: 0 /100 WBCS — SIGNIFICANT CHANGE UP (ref 0–0)
PLATELET # BLD AUTO: 146 K/UL — LOW (ref 150–400)
POTASSIUM SERPL-MCNC: 4.8 MMOL/L — SIGNIFICANT CHANGE UP (ref 3.5–5.3)
POTASSIUM SERPL-SCNC: 4.8 MMOL/L — SIGNIFICANT CHANGE UP (ref 3.5–5.3)
PROTHROM AB SERPL-ACNC: 18.4 SEC — HIGH (ref 10–12.9)
RBC # BLD: 3.85 M/UL — LOW (ref 4.2–5.8)
RBC # FLD: 18 % — HIGH (ref 10.3–14.5)
SODIUM SERPL-SCNC: 140 MMOL/L — SIGNIFICANT CHANGE UP (ref 135–145)
WBC # BLD: 6.81 K/UL — SIGNIFICANT CHANGE UP (ref 3.8–10.5)
WBC # FLD AUTO: 6.81 K/UL — SIGNIFICANT CHANGE UP (ref 3.8–10.5)

## 2019-11-07 PROCEDURE — 93306 TTE W/DOPPLER COMPLETE: CPT | Mod: 26

## 2019-11-07 PROCEDURE — 93010 ELECTROCARDIOGRAM REPORT: CPT

## 2019-11-07 PROCEDURE — 85610 PROTHROMBIN TIME: CPT

## 2019-11-07 PROCEDURE — 93010 ELECTROCARDIOGRAM REPORT: CPT | Mod: 77

## 2019-11-07 PROCEDURE — 85027 COMPLETE CBC AUTOMATED: CPT

## 2019-11-07 PROCEDURE — 93005 ELECTROCARDIOGRAM TRACING: CPT

## 2019-11-07 PROCEDURE — 85730 THROMBOPLASTIN TIME PARTIAL: CPT

## 2019-11-07 PROCEDURE — 93312 ECHO TRANSESOPHAGEAL: CPT

## 2019-11-07 PROCEDURE — 93306 TTE W/DOPPLER COMPLETE: CPT

## 2019-11-07 PROCEDURE — 92960 CARDIOVERSION ELECTRIC EXT: CPT

## 2019-11-07 PROCEDURE — 80048 BASIC METABOLIC PNL TOTAL CA: CPT

## 2019-11-07 PROCEDURE — 93312 ECHO TRANSESOPHAGEAL: CPT | Mod: 26

## 2019-11-07 NOTE — H&P CARDIOLOGY - HISTORY OF PRESENT ILLNESS
69M h/o HTN, T2DM A1c 6.0 , HFrEF with EF ~45%, chronic gout, complete heart block s/p PPM, non-Hodgkin lymphoma s/p chemo in 2004 (currently in remission), CKD stage II was recently admitted for CHF and treated with IV diuresis.   Presents today as outpatient for KUSHAL/ DCCV     Dr. Neris CM 69M h/o HTN, T2DM A1c 6.0 , HFrEF with EF ~45%, chronic gout, complete heart block s/p Micra PPM 5/2019, non-Hodgkin lymphoma s/p chemo in 2004 (currently in remission), CKD stage II was recently admitted for CHF and treated with IV diuresis and underwent CRT-P upgrade from Micra due to high pacing burden. Recently seen by Dr. Reyes   Presents today as outpatient for KUSHAL/ DCCV. for atrial flutter.   Memory is impaired daughter is involved with care and puts medications in dispenser at home.     Dr. Franco- EP   Dr. Lon Reyes- cardiologist 69M h/o HTN, T2DM A1c 6.0 , HFrEF with EF ~45%, chronic gout, atrial flutter (on Eliquis) complete heart block s/p Micra PPM 5/2019, non-Hodgkin lymphoma s/p chemo in 2004 (currently in remission), CKD stage II was recently admitted for CHF and treated with IV diuresis and underwent CRT-P upgrade from Micra due to high pacing burden. Recently seen by Dr. Reyes   Presents today as outpatient for KUSHAL/ DCCV. for atrial flutter.   Memory is impaired daughter is involved with care and puts medications in dispenser at home.     Dr. Franco- EP   Dr. Lon Reyes- cardiologist

## 2019-11-11 ENCOUNTER — APPOINTMENT (OUTPATIENT)
Dept: ENDOCRINOLOGY | Facility: CLINIC | Age: 69
End: 2019-11-11
Payer: MEDICARE

## 2019-11-11 ENCOUNTER — APPOINTMENT (OUTPATIENT)
Dept: CARDIOLOGY | Facility: CLINIC | Age: 69
End: 2019-11-11
Payer: MEDICARE

## 2019-11-11 ENCOUNTER — NON-APPOINTMENT (OUTPATIENT)
Age: 69
End: 2019-11-11

## 2019-11-11 VITALS
DIASTOLIC BLOOD PRESSURE: 64 MMHG | RESPIRATION RATE: 17 BRPM | OXYGEN SATURATION: 97 % | BODY MASS INDEX: 21.47 KG/M2 | TEMPERATURE: 98.1 F | SYSTOLIC BLOOD PRESSURE: 127 MMHG | HEART RATE: 78 BPM | WEIGHT: 162 LBS | HEIGHT: 73 IN

## 2019-11-11 VITALS
WEIGHT: 170 LBS | DIASTOLIC BLOOD PRESSURE: 60 MMHG | OXYGEN SATURATION: 98 % | HEART RATE: 67 BPM | BODY MASS INDEX: 22.43 KG/M2 | SYSTOLIC BLOOD PRESSURE: 159 MMHG

## 2019-11-11 PROBLEM — I48.92 UNSPECIFIED ATRIAL FLUTTER: Chronic | Status: ACTIVE | Noted: 2019-11-07

## 2019-11-11 PROBLEM — R41.3 OTHER AMNESIA: Chronic | Status: ACTIVE | Noted: 2019-11-07

## 2019-11-11 LAB
GLUCOSE BLDC GLUCOMTR-MCNC: 138
HBA1C MFR BLD HPLC: 5

## 2019-11-11 PROCEDURE — 93000 ELECTROCARDIOGRAM COMPLETE: CPT

## 2019-11-11 PROCEDURE — 76536 US EXAM OF HEAD AND NECK: CPT | Mod: 52

## 2019-11-11 PROCEDURE — 99215 OFFICE O/P EST HI 40 MIN: CPT

## 2019-11-11 PROCEDURE — 82962 GLUCOSE BLOOD TEST: CPT

## 2019-11-11 PROCEDURE — 99204 OFFICE O/P NEW MOD 45 MIN: CPT | Mod: 25

## 2019-11-11 PROCEDURE — 83036 HEMOGLOBIN GLYCOSYLATED A1C: CPT | Mod: QW

## 2019-11-11 PROCEDURE — 95251 CONT GLUC MNTR ANALYSIS I&R: CPT

## 2019-11-11 NOTE — PHYSICAL EXAM
[Well Groomed] : well groomed [Normal Appearance] : normal appearance [General Appearance - Well Developed] : well developed [General Appearance - Well Nourished] : well nourished [General Appearance - In No Acute Distress] : no acute distress [No Deformities] : no deformities [Normal Conjunctiva] : the conjunctiva exhibited no abnormalities [Normal Oral Mucosa] : normal oral mucosa [Eyelids - No Xanthelasma] : the eyelids demonstrated no xanthelasmas [No Oral Pallor] : no oral pallor [No Oral Cyanosis] : no oral cyanosis [JVD Elevated _____cm] : JVD elevated [unfilled] ~U cm above clavicle [Respiration, Rhythm And Depth] : normal respiratory rhythm and effort [Exaggerated Use Of Accessory Muscles For Inspiration] : no accessory muscle use [Auscultation Breath Sounds / Voice Sounds] : lungs were clear to auscultation bilaterally [Rhythm Regular] : regular [Normal Rate] : normal [Normal S1] : normal S1 [No Gallop] : no gallop heard [Normal S2] : normal S2 [II] : a grade 2 [Holosystolic] : holosystolic [LSB] : the murmur was transmitted to the LSB [2+] : Carotid: right 2+ [1+] : right 1+ [No Abnormalities] : the abdominal aorta was not enlarged and no bruit was heard [0] : right 0 [No Pitting Edema] : no pitting edema present [Abdomen Soft] : soft [Abdomen Tenderness] : non-tender [Abdomen Mass (___ Cm)] : no abdominal mass palpated [Gait - Sufficient For Exercise Testing] : the gait was sufficient for exercise testing [Abnormal Walk] : normal gait [Nail Clubbing] : no clubbing of the fingernails [Cyanosis, Localized] : no localized cyanosis [Petechial Hemorrhages (___cm)] : no petechial hemorrhages [Skin Color & Pigmentation] : normal skin color and pigmentation [Skin Lesions] : no skin lesions [No Venous Stasis] : no venous stasis [] : no rash [No Xanthoma] : no  xanthoma was observed [No Skin Ulcers] : no skin ulcer [Affect] : the affect was normal [Oriented To Time, Place, And Person] : oriented to person, place, and time [Mood] : the mood was normal [No Anxiety] : not feeling anxious [Right Carotid Bruit] : no bruit heard over the right carotid [Left Carotid Bruit] : no bruit heard over the left carotid

## 2019-11-11 NOTE — REVIEW OF SYSTEMS
[Negative] : Heme/Lymph [Lower Ext Edema] : no extremity edema [Leg Claudication] : no intermittent leg claudication [Palpitations] : no palpitations

## 2019-11-11 NOTE — ASSESSMENT
[Carbohydrate Consistent Diet] : carbohydrate consistent diet [Diabetes Foot Care] : diabetes foot care [Long Term Vascular Complications] : long term vascular complications of diabetes [Self Monitoring of Blood Glucose] : self monitoring of blood glucose [Importance of Diet and Exercise] : importance of diet and exercise to improve glycemic control, achieve weight loss and improve cardiovascular health [Retinopathy Screening] : Patient was referred to ophthalmology for retinopathy screening [FreeTextEntry1] : 70 y/o male with well controlled T2DM (A1c 6.7% on 09/24/19 and today 5.0%) complicated with CKD3 and h/o CVA. Also with multiple thyroid nodules, resistant HTN (managed with 4+ anti-hypertensives including spirinolactone), and HLD\par \par T2DM\par - I had a lengthy discussion regarding healthy diet (consistent carbohydrates and low calorie content) with the patient. I also emphasized to maintain routine exercise activity (30 minutes daily or 150 minutes in the week).\par - Currently not on any DM medications and not checking FS\par - Provided glucometer today but unable to stay for teaching given cardiology apt next. Advised to learn teaching by pharmacist. To check FS BID in a staggered manner for goal \par - Inserted Eliane today #[id number] and to return in 2 weeks\par - Labs uptodate.\par - Recommend f/u ophthalmologist atleast annually\par - Unable to do urine microalbumin today, will check next visit, continue Lisinopril\par \par HTN\par - BP goal <140/80, high today and as per family member was high last week as well\par - Patient is on Isosorbide 20 mg BID, Coreg 6.125mg BID, Hydralazine 100 mg TID, Spirinolactone 25 mg QD, Furosemide 80 mg QD, Lisinopril 40 mg QD.\par - No changes to meds today, f/u with cardiology later today as scheduled and notify him of BP value\par - Recommend check baseline plasma metanephrines and catecholamines (note has CKD3), and baseline Costa and renin (if suppressed, will need further testing without spirinolactone)\par \par HLD\par - Recommend continue Atorvastatin 40 mg QHS\par - F/u cardiology routinely as advised\par \par Multiple Thyroid Nodules\par - Recommend check TSH and FT4 with next labs\par - To schedule for FNA of Left thyroid nodule in 2 months based on size criteria\par - Brief ultrasound done 11/11/19\par \par F/u in 2-3 months for FNA\par \par Carter Vigil DO\par \par \par

## 2019-11-11 NOTE — DISCUSSION/SUMMARY
[Stable] : stable [Anticoagulation] : anticoagulation [Patient] : the patient [Chronic Systolic Heart Failure] : chronic systolic congestive heart failure [Essential Hypertension] : essential hypertension [Responding to Treatment] : responding to treatment [Severe Mitral Regurgitation] : severe mitral regurgitation [Compensated] : compensated [Dilated Annulus] : dilated annulus [None] : none [de-identified] : atrial flutter [de-identified] : decrease furosemide from 80 mg to 40 mg daily (observe weight now ideal, creatinine on recent BMP 1.88 above prior baseline of 1.5 [de-identified] : ventricular function improved with CRT, restored sinus rhythm, GDMT. [de-identified] : cardoiversion restored sinus rhythm [FreeTextEntry2] : and daughter

## 2019-11-11 NOTE — REVIEW OF SYSTEMS
[Recent Weight Loss (___ Lbs)] : recent [unfilled] ~Ulb weight loss [Polyuria] : polyuria [Nocturia] : nocturia [Easy Bleeding] : a ~M tendency for easy bleeding [Negative] : Endocrine [All other systems negative] : All other systems negative [Fatigue] : no fatigue [Visual Field Defect] : no visual field defect [Blurry Vision] : no blurred vision [Chest Pain] : no chest pain [Palpitations] : no palpitations [Lower Ext Edema] : no lower extremity edema [Shortness Of Breath] : no shortness of breath [SOB on Exertion] : no shortness of breath during exertion [Nausea] : no nausea [Vomiting] : no vomiting was observed [Constipation] : no constipation [Diarrhea] : no diarrhea [Acanthosis] : no acanthosis  [Acne] : no acne [Hair Loss] : no hair loss [Headache] : no headaches [Pain/Numbness of Digits] : no pain/numbness of digits [Confusion] : no confusion [Poor Balance] : steady state balance [Difficulty Walking] : no difficulty walking [Polydipsia] : no polydipsia [Cold Intolerance] : cold tolerant [Heat Intolerance] : heat tolerant [Hot Flashes] : no hot flashes [Cushing's Stigmata] : no Cushing's stigmata [Swelling] : no swelling [Lymphadenopathy] : no lymphadenopathy

## 2019-11-11 NOTE — CARDIOLOGY SUMMARY
[Mild] : mild LV dysfunction [LVEF ___%] : LVEF [unfilled]% [Moderate] : moderate pulmonary hypertension [Enlarged] : enlarged LA size [Severe] : severe mitral regurgitation [___] : [unfilled]

## 2019-11-11 NOTE — PHYSICAL EXAM
[No Acute Distress] : no acute distress [Alert] : alert [Well Nourished] : well nourished [Well Developed] : well developed [Normal Sclera/Conjunctiva] : normal sclera/conjunctiva [EOMI] : extra ocular movement intact [No Proptosis] : no proptosis [Normal Oropharynx] : the oropharynx was normal [Thyroid Not Enlarged] : the thyroid was not enlarged [No Respiratory Distress] : no respiratory distress [No Accessory Muscle Use] : no accessory muscle use [Clear to Auscultation] : lungs were clear to auscultation bilaterally [Normal Rate] : heart rate was normal  [Normal S1, S2] : normal S1 and S2 [Regular Rhythm] : with a regular rhythm [Pedal Pulses Normal] : the pedal pulses are present [No Edema] : there was no peripheral edema [Normal Bowel Sounds] : normal bowel sounds [Not Tender] : non-tender [Soft] : abdomen soft [Post Cervical Nodes] : posterior cervical nodes [Not Distended] : not distended [Anterior Cervical Nodes] : anterior cervical nodes [No Spinal Tenderness] : no spinal tenderness [Spine Straight] : spine straight [Normal Gait] : normal gait [No Stigmata of Cushings Syndrome] : no stigmata of cushings syndrome [Normal Strength/Tone] : muscle strength and tone were normal [No Rash] : no rash [No Tremors] : no tremors [Normal Reflexes] : deep tendon reflexes were 2+ and symmetric [Oriented x3] : oriented to person, place, and time [Normal Voice/Communication] : normal voice communication [Healthy Appearance] : healthy appearance [Normal Rate and Effort] : normal respiratory rhythm and effort [Normal Hearing] : hearing was normal [Full ROM] : with full range of motion [Normal] : normal [2+] : 2+ in the dorsalis pedis [Normal Insight/Judgement] : insight and judgment were intact [Normal Sensation on Monofilament Testing] : normal sensation on monofilament testing of lower extremities [Normal Affect] : the affect was normal [Normal Mood] : the mood was normal [Acanthosis Nigricans] : no acanthosis nigricans [Diminished Throughout Both Feet] : normal tactile sensation with monofilament testing throughout both feet [de-identified] : + left thyroid nodule palpable on exam

## 2019-11-11 NOTE — HISTORY OF PRESENT ILLNESS
[FreeTextEntry1] : Mr. Lon Skaggs is a 69 year old man with a history of ACC/AHA Stage C HF with borderline EF (LV 5.5 cm, EF 45%) likely from hypertensive cardiomyopathy with severe functional MR, CHB led to Micra PPM 5/2019, and CKD III (baseline Cr 1.5), NHL with history of doxarubicin exposure presenting with acute decompensated heart failure. He was roughly 30 lbs above his last discharge weight and the trigger is not taking his medications for 3 months because he felt well and did not refill prescriptions. He was also found to have atrial flutter which is a new diagnosis. He diuresed 40 lbs in hospital and underwent CRT-P upgrade from Micra due to high pacing burden. He is now euvolemic. After last visit had TE guided cardioversion. Continues to feel well, now fully active, able to walk quickly up flights of stairs, walk good distances all without dyspnea and denies orthopnea. Weight or further decreased.

## 2019-11-11 NOTE — HISTORY OF PRESENT ILLNESS
[FreeTextEntry1] : 70 y/o male here with family member for management of T2DM. Also with hypertension, and HLD\par \par Hosp: 09/26-10/02 for CHF and new Afib s/p cardioversion and Eliquis. EF is 45%. Also with h/o Non-Hodgkin Lymphoma s/p chemo and radiation in 2004. Also with HTN and HLD, complete heart block s/p pacemaker in Sept 2019. Also with severe MR.\par \par DM was diagnosed in 2016 (A1c was 7.4% at the time)\par Most recently, 09/26, A1c was 6% with some mild anemia. A1c today 5.0%\par Poc today is 138\par \par Complications include: No neuropathy, no retinopathy (LV was in 2016 atleast), Has CKD2-3. Has a h/o old infarct without any residual deficits. No h/o CAD or stents. \par Current DM Medications/Insulin: None\par Past DM Medications/Insulin: Glimepiride x 2 years, stopped in May 2019. Was on 2 mg QD\par \par Current Fingerstick Glucose Logs: Does not have a meter, not checking\par Current Diet Includes: Eats 2-3 meals\par Breakfast 9am -- coffee s/ cream and equal and roll \par Lunch if he eats 1-2pm == Mostly skips, if eats, will be a turkey sandwich\par Dinner 6-7pm == chicken, steak, rice, pasta, not much vegetables\par Snacks: seldom \par Drinks: no\par \par /60 today\par BMI 22\par Does not have BP cuff at home\par Taking Isosorbide 20 mg BID, Coreg 6.125mg BID, Hydralazine 100 mg TID, Spirinolactone 25 mg QD, Furosemide 80 mg QD, Lisinopril 40 mg QD. No known history of adrenal nodules. States has cardiology apt later today\par \par Additional history:\par FH; T2DM in mother and 2 brothers\par Soc Hx: No alcohol. Tobacco in the past, quit in 2017

## 2019-11-11 NOTE — DATA REVIEWED
[FreeTextEntry1] : A brief thyroid ultrasound was done 11/11/19 for palpable left thyroid nodule on exam\par Left thyroid isoechoic solid nodule with regular margin measures 2.5 x 1.7 cm in size. (Low suspicion 5-10%, with recommendations for biopsy >1.5 cm in size)\par Also noted to have R-isoechoic solid nodule measuring 1.0 cm in size without suspicious features.

## 2019-11-21 ENCOUNTER — RX RENEWAL (OUTPATIENT)
Age: 69
End: 2019-11-21

## 2019-11-21 LAB
ALDOSTERONE SERUM: 7.6 NG/DL
CREAT SPEC-SCNC: 133 MG/DL
DOPAMINE UR-MCNC: <30 PG/ML
EPINEPH UR-MCNC: <15 PG/ML
MICROALBUMIN 24H UR DL<=1MG/L-MCNC: 3.4 MG/DL
MICROALBUMIN/CREAT 24H UR-RTO: 26 MG/G
NOREPINEPH UR-MCNC: 684 PG/ML
RENIN ACTIVITY, PLASMA: 0.52 NG/ML/HR
T4 FREE SERPL-MCNC: 1.4 NG/DL
TSH SERPL-ACNC: 0.57 UIU/ML

## 2019-11-22 LAB
METANEPHRINE, PL: <10 PG/ML
NORMETANEPHRINE, PL: 88 PG/ML

## 2019-12-05 ENCOUNTER — CLINICAL ADVICE (OUTPATIENT)
Age: 69
End: 2019-12-05

## 2019-12-31 ENCOUNTER — APPOINTMENT (OUTPATIENT)
Dept: INTERNAL MEDICINE | Facility: CLINIC | Age: 69
End: 2019-12-31

## 2020-01-06 NOTE — PHYSICAL THERAPY INITIAL EVALUATION ADULT - IMPAIRED TRANSFERS: SIT/STAND, REHAB EVAL
membranes. Outcome: Completed     Problem: Impaired respiratory status  Goal: Lung sounds clear or within normal limits for patient  1/6/2020 1308 by Aidee Rahman RN  Outcome: Completed     Problem: DISCHARGE BARRIERS  Goal: Patient's continuum of care needs are met  Outcome: Completed   Care plan completed as appropriate. cognition/narrow base of support/impaired balance/decreased strength/decreased flexibility

## 2020-02-03 ENCOUNTER — APPOINTMENT (OUTPATIENT)
Dept: CARDIOLOGY | Facility: CLINIC | Age: 70
End: 2020-02-03
Payer: MEDICARE

## 2020-02-03 ENCOUNTER — NON-APPOINTMENT (OUTPATIENT)
Age: 70
End: 2020-02-03

## 2020-02-03 VITALS
HEART RATE: 82 BPM | DIASTOLIC BLOOD PRESSURE: 68 MMHG | OXYGEN SATURATION: 98 % | WEIGHT: 166 LBS | SYSTOLIC BLOOD PRESSURE: 162 MMHG | HEIGHT: 73 IN | BODY MASS INDEX: 22 KG/M2

## 2020-02-03 VITALS
WEIGHT: 168 LBS | DIASTOLIC BLOOD PRESSURE: 65 MMHG | HEIGHT: 73 IN | HEART RATE: 76 BPM | SYSTOLIC BLOOD PRESSURE: 140 MMHG | BODY MASS INDEX: 22.26 KG/M2

## 2020-02-03 PROCEDURE — 93000 ELECTROCARDIOGRAM COMPLETE: CPT

## 2020-02-03 PROCEDURE — 99215 OFFICE O/P EST HI 40 MIN: CPT

## 2020-02-03 NOTE — CARDIOLOGY SUMMARY
[___] : [unfilled] [LVEF ___%] : LVEF [unfilled]% [Mild] : mild LV dysfunction [Moderate] : moderate pulmonary hypertension [Enlarged] : enlarged LA size [Severe] : severe mitral regurgitation [___] : [unfilled]

## 2020-02-03 NOTE — DISCUSSION/SUMMARY
[Anticoagulation] : anticoagulation 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 [Cardiomyopathy] : cardiomyopathy [Stable] : stable [Hypertensive Cardiomyopathy] : hypertensive cardiomyopathy [Chronic Systolic Heart Failure] : chronic systolic congestive heart failure [Peripheral Vascular Disease] : peripheral vascular disease [JAM] : an ankle-brachial index [Patient] : the patient [Responding to Treatment] : responding to treatment [Compensated] : compensated [Essential Hypertension] : essential hypertension [Deteriorating] : deteriorating [Basic Metabolic Panel] : basic metabolic panel [Thyroid Function Tests] : thyroid function tests [None] : none [de-identified] : atrial flutter [de-identified] : cardoiversion restored sinus rhythm and maintained [de-identified] : Pacemaker interrogation [de-identified] : but concerned about compliance and signs that he is prone to severe deterioration as in past [de-identified] : furosemide 40 mg daily [de-identified] : ventricular function improved with CRT, restored sinus rhythm, GDMT. [de-identified] : discussed medications and he is unclear on some, especially lisinopril. Also record indicates should be out of carvedilol, insists has supply - compliance in question, but he indicates takes regularly. [FreeTextEntry2] : and daughter

## 2020-02-03 NOTE — PHYSICAL EXAM
[Normal Appearance] : normal appearance [General Appearance - Well Developed] : well developed [No Deformities] : no deformities [Well Groomed] : well groomed [General Appearance - Well Nourished] : well nourished [Normal Conjunctiva] : the conjunctiva exhibited no abnormalities [General Appearance - In No Acute Distress] : no acute distress [Eyelids - No Xanthelasma] : the eyelids demonstrated no xanthelasmas [Normal Oral Mucosa] : normal oral mucosa [No Oral Pallor] : no oral pallor [JVD Elevated _____cm] : JVD elevated [unfilled] ~U cm above clavicle [No Oral Cyanosis] : no oral cyanosis [Exaggerated Use Of Accessory Muscles For Inspiration] : no accessory muscle use [Respiration, Rhythm And Depth] : normal respiratory rhythm and effort [Normal Rate] : normal [Auscultation Breath Sounds / Voice Sounds] : lungs were clear to auscultation bilaterally [Normal S2] : normal S2 [No Gallop] : no gallop heard [Rhythm Regular] : regular [Normal S1] : normal S1 [LSB] : the murmur was transmitted to the LSB [Holosystolic] : holosystolic [2+] : left 2+ [0] : left 0 [No Abnormalities] : the abdominal aorta was not enlarged and no bruit was heard [No Pitting Edema] : no pitting edema present [Abdomen Tenderness] : non-tender [Abdomen Soft] : soft [Abnormal Walk] : normal gait [Gait - Sufficient For Exercise Testing] : the gait was sufficient for exercise testing [Abdomen Mass (___ Cm)] : no abdominal mass palpated [Nail Clubbing] : no clubbing of the fingernails [Petechial Hemorrhages (___cm)] : no petechial hemorrhages [Cyanosis, Localized] : no localized cyanosis [] : no rash [Skin Color & Pigmentation] : normal skin color and pigmentation [No Venous Stasis] : no venous stasis [Skin Lesions] : no skin lesions [No Skin Ulcers] : no skin ulcer [No Xanthoma] : no  xanthoma was observed [Oriented To Time, Place, And Person] : oriented to person, place, and time [Affect] : the affect was normal [Mood] : the mood was normal [No Anxiety] : not feeling anxious [II] : a grade 2 [Crescendo-Decrescendo] : crescendo-decrescendo [Early] : early [1+] : left 1+ [Right Carotid Bruit] : no bruit heard over the right carotid [Left Carotid Bruit] : no bruit heard over the left carotid

## 2020-02-03 NOTE — REVIEW OF SYSTEMS
[Negative] : Heme/Lymph [Leg Claudication] : intermittent leg claudication [Lower Ext Edema] : no extremity edema [Palpitations] : no palpitations

## 2020-02-03 NOTE — HISTORY OF PRESENT ILLNESS
[FreeTextEntry1] : Mr. Lon Skaggs is a 69 year old man with a history of ACC/AHA Stage C HF with borderline EF (LV 5.5 cm, EF 45%) likely from hypertensive cardiomyopathy with severe functional MR. 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 He was roughly 30 lbs above his last discharge weight and the trigger is not taking his medications for 3 months because he felt well and did not refill prescriptions. He was also found to have atrial flutter which is a new diagnosis. Successfully diuresed 40 lbs in hospital and underwent CRT-P upgrade from Micra due to high pacing burden. Had TE guided cardioversion and remained in sinus rhythm. Continues to feel well, now fully active, able to walk quickly up flights of stairs, walk good distances all without dyspnea and denies orthopnea. Weight or further decreased.

## 2020-02-04 ENCOUNTER — APPOINTMENT (OUTPATIENT)
Dept: ENDOCRINOLOGY | Facility: CLINIC | Age: 70
End: 2020-02-04
Payer: MEDICARE

## 2020-02-04 VITALS — DIASTOLIC BLOOD PRESSURE: 58 MMHG | SYSTOLIC BLOOD PRESSURE: 141 MMHG | HEART RATE: 76 BPM | OXYGEN SATURATION: 98 %

## 2020-02-04 PROCEDURE — 10005 FNA BX W/US GDN 1ST LES: CPT

## 2020-02-04 PROCEDURE — 99215 OFFICE O/P EST HI 40 MIN: CPT | Mod: 25

## 2020-02-04 NOTE — PROCEDURE
[Fine Needle Aspiration] : fine needle aspiration ~T ~C was performed [Area of Mass: ______] : mass identified in the [unfilled] [Risks] : risks [Benefits] : benefits [Consent Obtained] : written consent was obtained prior to the procedure and is detailed in the patient's record [Patient] : the patient [Ethyl Chloride] : ethyl chloride [EMLA Cream] : EMLA cream [Supine] : the patient was placed in the supine position with the neck extended as tolerated [Alcohol] : alcohol [25 gauge 1.5 inch] : A 25 gauge 1.5 inch needle was used [Ultrasonic Guidance] : ultrasound guidance was employed [____ Passes] : [unfilled] passes were made through the mass [Sent to Histology] : the specimens were prepared in the usual manner and sent to cytopathologist [Tolerated Well] : the patient tolerated the procedure well [Vital Signs Stable] : the vital signs were stable [Hemostasis] : hemostasis was assured and the patient was discharged in satisfactory condition [No Complications] : there were no complications [Instructions Given] : handouts/patient instructions were given to patient

## 2020-02-04 NOTE — ASSESSMENT
[FreeTextEntry1] : 68 y/o male with multiple thyroid nodules\par \par - We had an extensive discussion regarding thyroid nodules, the natural course of thyroid nodules, ultrasound features which can convey an increased risk for malignancy, the general indications for fine needle aspiration, the procedure itself and possible results from a fine needle aspiration including (malignant, atypia of undetermined significance, follicular neoplasm, insufficient sample, and benign).\par - FNA done of Left 2.5 x 1.7 cm lower pole complex nodule (predom solid) with u/s guidance. \par - Sample collected for Thyroseq as well\par - Patient tolerated procedure well without complications\par - Advised to use ice-pack or tylenol prn\par - Will need repeat u/s in 6 months if nodule is benign\par \par F/u in 4 months for medical f/u\par \par Carter Vigil DO\par \par

## 2020-02-05 LAB
ESTIMATED AVERAGE GLUCOSE: 103 MG/DL
HBA1C MFR BLD HPLC: 5.2 %

## 2020-02-06 LAB
ALBUMIN SERPL ELPH-MCNC: 4.7 G/DL
ALP BLD-CCNC: 110 U/L
ALT SERPL-CCNC: 21 U/L
ANION GAP SERPL CALC-SCNC: 15 MMOL/L
AST SERPL-CCNC: 28 U/L
BASOPHILS # BLD AUTO: 0.07 K/UL
BASOPHILS NFR BLD AUTO: 0.8 %
BILIRUB SERPL-MCNC: 0.5 MG/DL
BUN SERPL-MCNC: 40 MG/DL
CALCIUM SERPL-MCNC: 9.9 MG/DL
CHLORIDE SERPL-SCNC: 101 MMOL/L
CHOLEST SERPL-MCNC: 115 MG/DL
CHOLEST/HDLC SERPL: 3.1 RATIO
CO2 SERPL-SCNC: 26 MMOL/L
CREAT SERPL-MCNC: 1.9 MG/DL
EOSINOPHIL # BLD AUTO: 0.38 K/UL
EOSINOPHIL NFR BLD AUTO: 4.5 %
GLUCOSE SERPL-MCNC: 104 MG/DL
HCT VFR BLD CALC: 34.7 %
HDLC SERPL-MCNC: 38 MG/DL
HGB BLD-MCNC: 11 G/DL
IMM GRANULOCYTES NFR BLD AUTO: 0.4 %
LDLC SERPL CALC-MCNC: 61 MG/DL
LYMPHOCYTES # BLD AUTO: 1.02 K/UL
LYMPHOCYTES NFR BLD AUTO: 12.1 %
MAN DIFF?: NORMAL
MCHC RBC-ENTMCNC: 31.7 GM/DL
MCHC RBC-ENTMCNC: 32.3 PG
MCV RBC AUTO: 101.8 FL
MONOCYTES # BLD AUTO: 0.72 K/UL
MONOCYTES NFR BLD AUTO: 8.5 %
NEUTROPHILS # BLD AUTO: 6.21 K/UL
NEUTROPHILS NFR BLD AUTO: 73.7 %
PLATELET # BLD AUTO: 141 K/UL
POTASSIUM SERPL-SCNC: 5.5 MMOL/L
PROT SERPL-MCNC: 9 G/DL
RBC # BLD: 3.41 M/UL
RBC # FLD: 13.2 %
SODIUM SERPL-SCNC: 142 MMOL/L
T4 FREE SERPL-MCNC: 1.3 NG/DL
TRIGL SERPL-MCNC: 82 MG/DL
TSH SERPL-ACNC: 0.94 UIU/ML
WBC # FLD AUTO: 8.43 K/UL

## 2020-02-11 ENCOUNTER — APPOINTMENT (OUTPATIENT)
Dept: ELECTROPHYSIOLOGY | Facility: CLINIC | Age: 70
End: 2020-02-11
Payer: MEDICARE

## 2020-02-11 LAB — FNA, THYROID: NORMAL

## 2020-02-11 PROCEDURE — 93296 REM INTERROG EVL PM/IDS: CPT

## 2020-02-11 PROCEDURE — 93295 DEV INTERROG REMOTE 1/2/MLT: CPT

## 2020-02-21 ENCOUNTER — APPOINTMENT (OUTPATIENT)
Dept: CARDIOLOGY | Facility: CLINIC | Age: 70
End: 2020-02-21

## 2020-03-03 ENCOUNTER — NON-APPOINTMENT (OUTPATIENT)
Age: 70
End: 2020-03-03

## 2020-03-03 ENCOUNTER — APPOINTMENT (OUTPATIENT)
Dept: CARDIOLOGY | Facility: CLINIC | Age: 70
End: 2020-03-03

## 2020-03-03 ENCOUNTER — APPOINTMENT (OUTPATIENT)
Dept: CARDIOLOGY | Facility: CLINIC | Age: 70
End: 2020-03-03
Payer: MEDICARE

## 2020-03-03 VITALS
HEIGHT: 73 IN | OXYGEN SATURATION: 98 % | TEMPERATURE: 98.3 F | BODY MASS INDEX: 22.4 KG/M2 | DIASTOLIC BLOOD PRESSURE: 71 MMHG | RESPIRATION RATE: 17 BRPM | WEIGHT: 169 LBS | HEART RATE: 84 BPM | SYSTOLIC BLOOD PRESSURE: 122 MMHG

## 2020-03-03 PROCEDURE — 36415 COLL VENOUS BLD VENIPUNCTURE: CPT

## 2020-03-03 PROCEDURE — 93000 ELECTROCARDIOGRAM COMPLETE: CPT

## 2020-03-03 PROCEDURE — 99215 OFFICE O/P EST HI 40 MIN: CPT

## 2020-03-03 RX ORDER — HYDRALAZINE HYDROCHLORIDE 100 MG/1
100 TABLET ORAL EVERY 8 HOURS
Qty: 270 | Refills: 1 | Status: DISCONTINUED | COMMUNITY
Start: 2019-06-26 | End: 2020-03-03

## 2020-03-03 NOTE — PHYSICAL EXAM
[General Appearance - Well Developed] : well developed [Normal Appearance] : normal appearance [Well Groomed] : well groomed [General Appearance - Well Nourished] : well nourished [No Deformities] : no deformities [General Appearance - In No Acute Distress] : no acute distress [Normal Conjunctiva] : the conjunctiva exhibited no abnormalities [Eyelids - No Xanthelasma] : the eyelids demonstrated no xanthelasmas [Normal Oral Mucosa] : normal oral mucosa [No Oral Pallor] : no oral pallor [JVD Elevated _____cm] : JVD elevated [unfilled] ~U cm above clavicle [No Oral Cyanosis] : no oral cyanosis [Respiration, Rhythm And Depth] : normal respiratory rhythm and effort [Exaggerated Use Of Accessory Muscles For Inspiration] : no accessory muscle use [Auscultation Breath Sounds / Voice Sounds] : lungs were clear to auscultation bilaterally [Normal Rate] : normal [Rhythm Regular] : regular [Normal S1] : normal S1 [Normal S2] : normal S2 [No Gallop] : no gallop heard [Holosystolic] : holosystolic [LSB] : the murmur was transmitted to the LSB [II] : a grade 2 [Crescendo-Decrescendo] : crescendo-decrescendo [Early] : early [2+] : left 2+ [1+] : left 1+ [0] : left 0 [No Abnormalities] : the abdominal aorta was not enlarged and no bruit was heard [No Pitting Edema] : no pitting edema present [Abdomen Soft] : soft [Abdomen Tenderness] : non-tender [Abdomen Mass (___ Cm)] : no abdominal mass palpated [Abnormal Walk] : normal gait [Gait - Sufficient For Exercise Testing] : the gait was sufficient for exercise testing [Nail Clubbing] : no clubbing of the fingernails [Cyanosis, Localized] : no localized cyanosis [Petechial Hemorrhages (___cm)] : no petechial hemorrhages [Skin Color & Pigmentation] : normal skin color and pigmentation [] : no rash [No Venous Stasis] : no venous stasis [Skin Lesions] : no skin lesions [No Skin Ulcers] : no skin ulcer [No Xanthoma] : no  xanthoma was observed [Oriented To Time, Place, And Person] : oriented to person, place, and time [Affect] : the affect was normal [Mood] : the mood was normal [No Anxiety] : not feeling anxious [Right Carotid Bruit] : no bruit heard over the right carotid [Left Carotid Bruit] : no bruit heard over the left carotid

## 2020-03-03 NOTE — HISTORY OF PRESENT ILLNESS
[FreeTextEntry1] : Mr. Lon Skaggs is a 69 year old man with a history of ACC/AHA Stage C HF with borderline EF (LV 5.5 cm, EF 45%) likely from hypertensive cardiomyopathy with severe functional MR. 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 He was roughly 30 lbs above his last discharge weight and the trigger is not taking his medications for 3 months because he felt well and did not refill prescriptions. He was also found to have atrial flutter which is a new diagnosis. Successfully diuresed 40 lbs in hospital and underwent CRT-P upgrade from Micra due to high pacing burden. Had TE guided cardioversion and remained in sinus rhythm. Continues to feel well, now fully active, able to walk quickly up flights of stairs, walk good distances all without dyspnea and denies orthopnea. \par \par At last visit found serum potassium elevated and endocrine had instructed to increase spironolactone, but contacted him to remain unchanged and avoid high potassium sources in diet.

## 2020-03-03 NOTE — CARDIOLOGY SUMMARY
[LVEF ___%] : LVEF [unfilled]% [Mild] : mild LV dysfunction [Moderate] : moderate pulmonary hypertension [Enlarged] : enlarged LA size [Severe] : severe mitral regurgitation [___] : [unfilled]

## 2020-03-03 NOTE — DISCUSSION/SUMMARY
[Anticoagulation] : anticoagulation 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 [Cardiomyopathy] : cardiomyopathy [Hypertensive Cardiomyopathy] : hypertensive cardiomyopathy [Chronic Systolic Heart Failure] : chronic systolic congestive heart failure [Essential Hypertension] : essential hypertension [Compensated] : compensated [Deteriorating] : deteriorating [Basic Metabolic Panel] : basic metabolic panel [Peripheral Vascular Disease] : peripheral vascular disease [JAM] : an ankle-brachial index [Stable] : stable [None] : none [Patient] : the patient [de-identified] : atrial flutter [de-identified] : Pacemaker interrogation of Bi-Ventricular CRT device [de-identified] : cardoiversion restored sinus rhythm and maintained [de-identified] : at last visit became clear was not compliant with medications, seems improved now [de-identified] : has pacemaker, but never interrogated since implant September 2019 [de-identified] : ventricular function improved with CRT, restored sinus rhythm, GDMT. [de-identified] : furosemide 40 mg daily, stopped hydralazine, continue lisinopril, attempt to continue spironolactone with BMP pending [FreeTextEntry2] : and daughter

## 2020-03-03 NOTE — REVIEW OF SYSTEMS
[Leg Claudication] : intermittent leg claudication [Negative] : Heme/Lymph [Lower Ext Edema] : no extremity edema [Palpitations] : no palpitations

## 2020-03-04 LAB
ANION GAP SERPL CALC-SCNC: 14 MMOL/L
BUN SERPL-MCNC: 36 MG/DL
CALCIUM SERPL-MCNC: 9.5 MG/DL
CHLORIDE SERPL-SCNC: 102 MMOL/L
CO2 SERPL-SCNC: 27 MMOL/L
CREAT SERPL-MCNC: 2.24 MG/DL
GLUCOSE SERPL-MCNC: 99 MG/DL
POTASSIUM SERPL-SCNC: 5 MMOL/L
SODIUM SERPL-SCNC: 143 MMOL/L

## 2020-03-17 ENCOUNTER — APPOINTMENT (OUTPATIENT)
Dept: CARDIOLOGY | Facility: CLINIC | Age: 70
End: 2020-03-17

## 2020-03-19 NOTE — ED PROVIDER NOTE - CCCP TRG CHIEF CMPLNT
altered mental status Mauc Instructions: By selecting yes to the question below the MAUC number will be added into the note.  This will be calculated automatically based on the diagnosis chosen, the size entered, the body zone selected (H,M,L) and the specific indications you chose. You will also have the option to override the Mohs AUC if you disagree with the automatically calculated number and this option is found in the Case Summary tab.

## 2020-03-22 ENCOUNTER — RX RENEWAL (OUTPATIENT)
Age: 70
End: 2020-03-22

## 2020-05-13 ENCOUNTER — APPOINTMENT (OUTPATIENT)
Dept: ELECTROPHYSIOLOGY | Facility: CLINIC | Age: 70
End: 2020-05-13
Payer: MEDICARE

## 2020-05-13 PROCEDURE — 93296 REM INTERROG EVL PM/IDS: CPT

## 2020-05-13 PROCEDURE — 93295 DEV INTERROG REMOTE 1/2/MLT: CPT

## 2020-05-19 ENCOUNTER — APPOINTMENT (OUTPATIENT)
Dept: CARDIOLOGY | Facility: CLINIC | Age: 70
End: 2020-05-19

## 2020-06-17 ENCOUNTER — APPOINTMENT (OUTPATIENT)
Dept: ENDOCRINOLOGY | Facility: CLINIC | Age: 70
End: 2020-06-17
Payer: MEDICARE

## 2020-06-17 ENCOUNTER — APPOINTMENT (OUTPATIENT)
Dept: CARDIOLOGY | Facility: CLINIC | Age: 70
End: 2020-06-17
Payer: MEDICARE

## 2020-06-17 ENCOUNTER — APPOINTMENT (OUTPATIENT)
Dept: INTERNAL MEDICINE | Facility: CLINIC | Age: 70
End: 2020-06-17
Payer: MEDICARE

## 2020-06-17 ENCOUNTER — NON-APPOINTMENT (OUTPATIENT)
Age: 70
End: 2020-06-17

## 2020-06-17 VITALS
HEIGHT: 73 IN | WEIGHT: 189 LBS | SYSTOLIC BLOOD PRESSURE: 147 MMHG | HEART RATE: 88 BPM | BODY MASS INDEX: 25.05 KG/M2 | OXYGEN SATURATION: 100 % | DIASTOLIC BLOOD PRESSURE: 77 MMHG

## 2020-06-17 VITALS — DIASTOLIC BLOOD PRESSURE: 70 MMHG | SYSTOLIC BLOOD PRESSURE: 130 MMHG | HEART RATE: 76 BPM

## 2020-06-17 VITALS — HEIGHT: 73 IN | SYSTOLIC BLOOD PRESSURE: 150 MMHG | DIASTOLIC BLOOD PRESSURE: 80 MMHG | TEMPERATURE: 97.4 F

## 2020-06-17 DIAGNOSIS — Z83.3 FAMILY HISTORY OF DIABETES MELLITUS: ICD-10-CM

## 2020-06-17 DIAGNOSIS — Z80.0 FAMILY HISTORY OF MALIGNANT NEOPLASM OF DIGESTIVE ORGANS: ICD-10-CM

## 2020-06-17 DIAGNOSIS — N40.0 BENIGN PROSTATIC HYPERPLASIA WITHOUT LOWER URINARY TRACT SYMPMS: ICD-10-CM

## 2020-06-17 DIAGNOSIS — Z82.49 FAMILY HISTORY OF ISCHEMIC HEART DISEASE AND OTHER DISEASES OF THE CIRCULATORY SYSTEM: ICD-10-CM

## 2020-06-17 DIAGNOSIS — Z80.7 FAMILY HISTORY OF OTHER MALIGNANT NEOPLASMS OF LYMPHOID, HEMATOPOIETIC AND RELATED TISSUES: ICD-10-CM

## 2020-06-17 PROCEDURE — 93000 ELECTROCARDIOGRAM COMPLETE: CPT

## 2020-06-17 PROCEDURE — 76536 US EXAM OF HEAD AND NECK: CPT | Mod: 52

## 2020-06-17 PROCEDURE — 99215 OFFICE O/P EST HI 40 MIN: CPT

## 2020-06-17 PROCEDURE — 99203 OFFICE O/P NEW LOW 30 MIN: CPT | Mod: 25

## 2020-06-17 PROCEDURE — 90670 PCV13 VACCINE IM: CPT

## 2020-06-17 PROCEDURE — 36415 COLL VENOUS BLD VENIPUNCTURE: CPT

## 2020-06-17 PROCEDURE — G0009: CPT

## 2020-06-17 PROCEDURE — 99214 OFFICE O/P EST MOD 30 MIN: CPT | Mod: 25

## 2020-06-17 NOTE — PHYSICAL EXAM
[General Appearance - Well Developed] : well developed [Well Groomed] : well groomed [Normal Appearance] : normal appearance [General Appearance - In No Acute Distress] : no acute distress [General Appearance - Well Nourished] : well nourished [No Deformities] : no deformities [Normal Oral Mucosa] : normal oral mucosa [Eyelids - No Xanthelasma] : the eyelids demonstrated no xanthelasmas [Normal Conjunctiva] : the conjunctiva exhibited no abnormalities [No Oral Pallor] : no oral pallor [JVD Elevated _____cm] : JVD elevated [unfilled] ~U cm above clavicle [No Oral Cyanosis] : no oral cyanosis [Exaggerated Use Of Accessory Muscles For Inspiration] : no accessory muscle use [Auscultation Breath Sounds / Voice Sounds] : lungs were clear to auscultation bilaterally [Respiration, Rhythm And Depth] : normal respiratory rhythm and effort [Normal Rate] : normal [Rhythm Regular] : regular [Normal S1] : normal S1 [No Gallop] : no gallop heard [Normal S2] : normal S2 [LSB] : the murmur was transmitted to the LSB [Holosystolic] : holosystolic [Crescendo-Decrescendo] : crescendo-decrescendo [II] : a grade 2 [Early] : early [2+] : left 2+ [1+] : left 1+ [No Abnormalities] : the abdominal aorta was not enlarged and no bruit was heard [0] : left 0 [No Pitting Edema] : no pitting edema present [Abdomen Soft] : soft [Abdomen Tenderness] : non-tender [Abdomen Mass (___ Cm)] : no abdominal mass palpated [Gait - Sufficient For Exercise Testing] : the gait was sufficient for exercise testing [Abnormal Walk] : normal gait [Nail Clubbing] : no clubbing of the fingernails [Cyanosis, Localized] : no localized cyanosis [Petechial Hemorrhages (___cm)] : no petechial hemorrhages [Skin Color & Pigmentation] : normal skin color and pigmentation [No Venous Stasis] : no venous stasis [] : no rash [Skin Lesions] : no skin lesions [No Skin Ulcers] : no skin ulcer [No Xanthoma] : no  xanthoma was observed [Oriented To Time, Place, And Person] : oriented to person, place, and time [Mood] : the mood was normal [No Anxiety] : not feeling anxious [Affect] : the affect was normal [Right Carotid Bruit] : no bruit heard over the right carotid [Left Carotid Bruit] : no bruit heard over the left carotid

## 2020-06-17 NOTE — HISTORY OF PRESENT ILLNESS
[FreeTextEntry1] : Mr. Lon Skaggs is a 69 year old man with a history of ACC/AHA Stage C HF with borderline EF (LV 5.5 cm, EF 45%) likely from hypertensive cardiomyopathy with severe functional MR. 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 He was roughly 30 lbs above his last discharge weight and the trigger is not taking his medications for 3 months because he felt well and did not refill prescriptions. He was also found to have atrial flutter which is a new diagnosis. Successfully diuresed 40 lbs in hospital and underwent CRT-P upgrade from Micra due to high pacing burden. Had TE guided cardioversion and remained in sinus rhythm. Continues to feel well, now fully active, able to walk quickly up flights of stairs, walk good distances all without dyspnea and denies orthopnea. \par \par At last visit found serum potassium elevated and endocrine had instructed to increase spironolactone, but contacted him to remain unchanged and avoid high potassium sources in diet. Since then doing very well, active, no dyspnea, there is no orthopnea or paroxysmal nocturnal dyspnea.

## 2020-06-17 NOTE — DISCUSSION/SUMMARY
[Anticoagulation] : anticoagulation 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 [Cardiomyopathy] : cardiomyopathy [Improving] : improving [NYHA Class II] : NYHA Class II [Hypertensive Cardiomyopathy] : hypertensive cardiomyopathy [Responding to Treatment] : responding to treatment [Chronic Systolic Heart Failure] : chronic systolic congestive heart failure [Compensated] : compensated [Deteriorating] : deteriorating [Essential Hypertension] : essential hypertension [Peripheral Vascular Disease] : peripheral vascular disease [Stable] : stable [JAM] : an ankle-brachial index [Patient] : the patient [None] : none [de-identified] : cardoiversion restored sinus rhythm and maintained [de-identified] : Pacemaker interrogation of Bi-Ventricular CRT device [de-identified] : atrial flutter [de-identified] : has pacemaker, but never had optimization interrogation after implant September 2019. Have contacted EP and arranging [de-identified] : at last visit became clear was not compliant with medications, seems improved now [de-identified] : ventricular function improved with CRT, restored sinus rhythm, GDMT. [de-identified] : furosemide 0 mg daily, stopped hydralazine, continue lisinopril, continue spironolactone at lower dose [FreeTextEntry2] : and daughter

## 2020-06-18 LAB
ALBUMIN SERPL ELPH-MCNC: 5 G/DL
ALP BLD-CCNC: 114 U/L
ALT SERPL-CCNC: 15 U/L
ANION GAP SERPL CALC-SCNC: 15 MMOL/L
AST SERPL-CCNC: 20 U/L
BASOPHILS # BLD AUTO: 0.05 K/UL
BASOPHILS NFR BLD AUTO: 0.6 %
BILIRUB SERPL-MCNC: 0.5 MG/DL
BUN SERPL-MCNC: 39 MG/DL
CALCIUM SERPL-MCNC: 9.8 MG/DL
CHLORIDE SERPL-SCNC: 101 MMOL/L
CHOLEST SERPL-MCNC: 118 MG/DL
CHOLEST/HDLC SERPL: 4.1 RATIO
CO2 SERPL-SCNC: 25 MMOL/L
CREAT SERPL-MCNC: 1.97 MG/DL
CRP SERPL-MCNC: 0.8 MG/DL
EOSINOPHIL # BLD AUTO: 0.45 K/UL
EOSINOPHIL NFR BLD AUTO: 5.5 %
ESTIMATED AVERAGE GLUCOSE: 131 MG/DL
GLUCOSE SERPL-MCNC: 135 MG/DL
HBA1C MFR BLD HPLC: 6.2 %
HCT VFR BLD CALC: 41 %
HCV AB SER QL: NONREACTIVE
HCV S/CO RATIO: 0.31 S/CO
HDLC SERPL-MCNC: 29 MG/DL
HGB BLD-MCNC: 12.5 G/DL
IMM GRANULOCYTES NFR BLD AUTO: 0.5 %
LDLC SERPL CALC-MCNC: 65 MG/DL
LYMPHOCYTES # BLD AUTO: 1.11 K/UL
LYMPHOCYTES NFR BLD AUTO: 13.5 %
MAN DIFF?: NORMAL
MCHC RBC-ENTMCNC: 30.5 GM/DL
MCHC RBC-ENTMCNC: 30.5 PG
MCV RBC AUTO: 100 FL
MONOCYTES # BLD AUTO: 0.7 K/UL
MONOCYTES NFR BLD AUTO: 8.5 %
NEUTROPHILS # BLD AUTO: 5.85 K/UL
NEUTROPHILS NFR BLD AUTO: 71.4 %
PLATELET # BLD AUTO: 138 K/UL
POTASSIUM SERPL-SCNC: 5.5 MMOL/L
PROT SERPL-MCNC: 9.3 G/DL
PSA SERPL-MCNC: 1.17 NG/ML
RBC # BLD: 4.1 M/UL
RBC # FLD: 13.5 %
SARS-COV-2 IGG SERPL IA-ACNC: <3.8 AU/ML
SARS-COV-2 IGG SERPL QL IA: NEGATIVE
SODIUM SERPL-SCNC: 141 MMOL/L
TRIGL SERPL-MCNC: 120 MG/DL
TSH SERPL-ACNC: 1.21 UIU/ML
WBC # FLD AUTO: 8.2 K/UL

## 2020-06-23 ENCOUNTER — APPOINTMENT (OUTPATIENT)
Dept: ELECTROPHYSIOLOGY | Facility: CLINIC | Age: 70
End: 2020-06-23
Payer: MEDICARE

## 2020-06-23 VITALS
DIASTOLIC BLOOD PRESSURE: 89 MMHG | SYSTOLIC BLOOD PRESSURE: 169 MMHG | HEART RATE: 89 BPM | HEIGHT: 73 IN | OXYGEN SATURATION: 97 %

## 2020-06-23 PROCEDURE — 93281 PM DEVICE PROGR EVAL MULTI: CPT

## 2020-07-14 ENCOUNTER — APPOINTMENT (OUTPATIENT)
Dept: ENDOCRINOLOGY | Facility: CLINIC | Age: 70
End: 2020-07-14
Payer: MEDICARE

## 2020-07-14 ENCOUNTER — APPOINTMENT (OUTPATIENT)
Dept: INTERNAL MEDICINE | Facility: CLINIC | Age: 70
End: 2020-07-14
Payer: MEDICARE

## 2020-07-14 VITALS
DIASTOLIC BLOOD PRESSURE: 70 MMHG | SYSTOLIC BLOOD PRESSURE: 140 MMHG | WEIGHT: 186 LBS | TEMPERATURE: 97.4 F | BODY MASS INDEX: 24.65 KG/M2 | HEIGHT: 73 IN

## 2020-07-14 DIAGNOSIS — Z87.891 PERSONAL HISTORY OF NICOTINE DEPENDENCE: ICD-10-CM

## 2020-07-14 PROCEDURE — 99214 OFFICE O/P EST MOD 30 MIN: CPT | Mod: 25

## 2020-07-14 PROCEDURE — 90715 TDAP VACCINE 7 YRS/> IM: CPT

## 2020-07-14 PROCEDURE — 90471 IMMUNIZATION ADMIN: CPT

## 2020-07-14 PROCEDURE — G0439: CPT | Mod: 25

## 2020-07-14 PROCEDURE — 10005 FNA BX W/US GDN 1ST LES: CPT

## 2020-07-14 NOTE — HEALTH RISK ASSESSMENT
[Good] : ~his/her~ current health as good [Very Good] : ~his/her~  mood as very good [] : No [No] : No [No falls in past year] : Patient reported no falls in the past year [0] : 2) Feeling down, depressed, or hopeless: Not at all (0) [de-identified] : walk [de-identified] : low fat [Language] : denies difficulty with language [Change in mental status noted] : No change in mental status noted [Behavior] : denies difficulty with behavior [Handling Complex Tasks] : denies difficulty handling complex tasks [Learning/Retaining New Information] : denies difficulty learning/retaining new information [Reasoning] : denies difficulty with reasoning [Spatial Ability and Orientation] : denies difficulty with spatial ability and orientation [None] : None [Alone] : lives alone [Retired] : retired [] :  [Fully functional (bathing, dressing, toileting, transferring, walking, feeding)] : Fully functional (bathing, dressing, toileting, transferring, walking, feeding) [Feels Safe at Home] : Feels safe at home [Fully functional (using the telephone, shopping, preparing meals, housekeeping, doing laundry, using] : Fully functional and needs no help or supervision to perform IADLs (using the telephone, shopping, preparing meals, housekeeping, doing laundry, using transportation, managing medications and managing finances) [Reports changes in vision] : Reports no changes in vision [Seat Belt] :  uses seat belt [Smoke Detector] : smoke detector [Name: ___] : Health Care Proxy's Name: [unfilled]  [Designated Healthcare Proxy] : Designated healthcare proxy [Relationship: ___] : Relationship: [unfilled]

## 2020-07-14 NOTE — HEALTH RISK ASSESSMENT
[Good] : ~his/her~ current health as good [Very Good] : ~his/her~  mood as very good [] : No [No] : No [No falls in past year] : Patient reported no falls in the past year [de-identified] : walk [0] : 2) Feeling down, depressed, or hopeless: Not at all (0) [Language] : denies difficulty with language [Change in mental status noted] : No change in mental status noted [de-identified] : low fat [Behavior] : denies difficulty with behavior [Handling Complex Tasks] : denies difficulty handling complex tasks [Learning/Retaining New Information] : denies difficulty learning/retaining new information [Reasoning] : denies difficulty with reasoning [Spatial Ability and Orientation] : denies difficulty with spatial ability and orientation [None] : None [Alone] : lives alone [Retired] : retired [] :  [Feels Safe at Home] : Feels safe at home [Fully functional (bathing, dressing, toileting, transferring, walking, feeding)] : Fully functional (bathing, dressing, toileting, transferring, walking, feeding) [Fully functional (using the telephone, shopping, preparing meals, housekeeping, doing laundry, using] : Fully functional and needs no help or supervision to perform IADLs (using the telephone, shopping, preparing meals, housekeeping, doing laundry, using transportation, managing medications and managing finances) [Seat Belt] :  uses seat belt [Smoke Detector] : smoke detector [Reports changes in vision] : Reports no changes in vision [Designated Healthcare Proxy] : Designated healthcare proxy [Relationship: ___] : Relationship: [unfilled] [Name: ___] : Health Care Proxy's Name: [unfilled]

## 2020-07-14 NOTE — HISTORY OF PRESENT ILLNESS
[Family Member] : family member [FreeTextEntry1] : CHB, CHF, A. flutter, DM, HTN,CRI, thyroid nodules, and hypercholesterol\par  [de-identified] : fatigue\par

## 2020-07-14 NOTE — REASON FOR VISIT
[Procedure: _________] : a [unfilled] procedure visit Mastoid Interpolation Flap Text: A decision was made to reconstruct the defect utilizing an interpolation axial flap and a staged reconstruction.  A telfa template was made of the defect.  This telfa template was then used to outline the mastoid interpolation flap.  The donor area for the pedicle flap was then injected with anesthesia.  The flap was excised through the skin and subcutaneous tissue down to the layer of the underlying musculature.  The pedicle flap was carefully excised within this deep plane to maintain its blood supply.  The edges of the donor site were undermined.   The donor site was closed in a primary fashion.  The pedicle was then rotated into position and sutured.  Once the tube was sutured into place, adequate blood supply was confirmed with blanching and refill.  The pedicle was then wrapped with xeroform gauze and dressed appropriately with a telfa and gauze bandage to ensure continued blood supply and protect the attached pedicle.

## 2020-07-14 NOTE — ASSESSMENT
[FreeTextEntry1] : CHB, A. flutter, and CHF-stable.  per card.  cont. same meds.\par DM-follow A1C.  per endo.  refer to ophtho ([facility])\par Continue same blood pressure medication.  Follow blood pressure.\par Continue same cholesterol medication.  Follow lipid.\par thyroid nodules-per endo \par CRI-follow BUN/creat\par Tdap\par shingrix handout given to pt and daughter

## 2020-07-14 NOTE — PHYSICAL EXAM
[Testes Mass (___cm)] : there were no testicular masses [Normal Supraclavicular Nodes] : no supraclavicular lymphadenopathy [Cyanosis] : no cyanosis [Abnormal Temperature] : normal temperature [Right Foot Was Examined] : Right foot ~C was examined [Left Foot Was Examined] : left foot ~C was examined [None] : no ulcers in either foot were found [No Acute Distress] : no acute distress [Well Developed] : well developed [Well Nourished] : well nourished [Well-Appearing] : well-appearing [Normal Outer Ear/Nose] : the outer ears and nose were normal in appearance [Supple] : supple [No Accessory Muscle Use] : no accessory muscle use [No Respiratory Distress] : no respiratory distress  [Clear to Auscultation] : lungs were clear to auscultation bilaterally [Regular Rhythm] : with a regular rhythm [Normal Rate] : normal rate  [No Edema] : there was no peripheral edema [Soft] : abdomen soft [Non Tender] : non-tender [Normal Anterior Cervical Nodes] : no anterior cervical lymphadenopathy [Normal Posterior Cervical Nodes] : no posterior cervical lymphadenopathy [Coordination Grossly Intact] : coordination grossly intact [No Spinal Tenderness] : no spinal tenderness [No CVA Tenderness] : no CVA  tenderness [Normal Gait] : normal gait [Speech Grossly Normal] : speech grossly normal [Normal Affect] : the affect was normal [Normal Mood] : the mood was normal

## 2020-07-14 NOTE — HISTORY OF PRESENT ILLNESS
[Family Member] : family member [FreeTextEntry1] : CHB, CHF, A. flutter, DM, HTN,CRI, thyroid nodules, and hypercholesterol\par  [de-identified] : fatigue\par

## 2020-07-14 NOTE — PHYSICAL EXAM
[Normal Supraclavicular Nodes] : no supraclavicular lymphadenopathy [Testes Mass (___cm)] : there were no testicular masses [Abnormal Temperature] : normal temperature [Cyanosis] : no cyanosis [Right Foot Was Examined] : Right foot ~C was examined [Left Foot Was Examined] : left foot ~C was examined [None] : no ulcers in either foot were found [No Acute Distress] : no acute distress [Well Nourished] : well nourished [Well Developed] : well developed [Well-Appearing] : well-appearing [Normal Outer Ear/Nose] : the outer ears and nose were normal in appearance [Supple] : supple [No Respiratory Distress] : no respiratory distress  [No Accessory Muscle Use] : no accessory muscle use [Normal Rate] : normal rate  [Clear to Auscultation] : lungs were clear to auscultation bilaterally [Regular Rhythm] : with a regular rhythm [Soft] : abdomen soft [No Edema] : there was no peripheral edema [Normal Posterior Cervical Nodes] : no posterior cervical lymphadenopathy [Non Tender] : non-tender [Normal Anterior Cervical Nodes] : no anterior cervical lymphadenopathy [Coordination Grossly Intact] : coordination grossly intact [No CVA Tenderness] : no CVA  tenderness [No Spinal Tenderness] : no spinal tenderness [Speech Grossly Normal] : speech grossly normal [Normal Gait] : normal gait [Normal Mood] : the mood was normal [Normal Affect] : the affect was normal

## 2020-07-14 NOTE — ASSESSMENT
[FreeTextEntry1] : 69 y/o male with multiple thryoid nodules\par \par Left lower pole 2.37 x 2.76 cm thyroid nodule\par - Initially biopsied and returned category I non diagnostic so biopsied again today\par - We had an extensive discussion regarding thyroid nodules, the natural course of thyroid nodules, ultrasound features which can convey an increased risk for malignancy, the general indications for fine needle aspiration, the procedure itself and possible results from a fine needle aspiration including (malignant, atypia of undetermined significance, follicular neoplasm, insufficient sample, and benign).\par - FNA done with u/s guidance with 5 needle passes, 1 was dedicated thyroseq\par - Patient tolerated procedure well without complications\par \par Will call him with results\par F/u in 6 months

## 2020-07-14 NOTE — PROCEDURE
[Fine Needle Aspiration] : Fine needle aspiration ~T ~C was performed. [Area of Mass: ______] : mass identified in the [unfilled] [Risks] : risks [Patient] : the patient [Benefits] : benefits [Ethyl Chloride] : ethyl chloride [EMLA Cream] : EMLA cream [Alcohol] : with alcohol [____ Passes] : [unfilled] passes were made through the mass [Ultrasonic Guidance] : ultrasound guidance was employed [25 gauge 1.5 inch] : A 25 gauge 1.5 inch needle was used [Thyroseq] : Thyroseq [Tolerated Well] : the patient tolerated the procedure well [Sent to Histology] : The specimens were prepared in the usual manner and sent to histology. [Vital Signs Stable] : the vital signs were stable [Hemostasis] : hemostasis was assured and the patient was discharged in satisfactory condition [No Complications] : There were no complications

## 2020-07-16 LAB — FNA, THYROID: NORMAL

## 2020-08-17 ENCOUNTER — FORM ENCOUNTER (OUTPATIENT)
Age: 70
End: 2020-08-17

## 2020-09-23 ENCOUNTER — APPOINTMENT (OUTPATIENT)
Dept: ELECTROPHYSIOLOGY | Facility: CLINIC | Age: 70
End: 2020-09-23
Payer: MEDICARE

## 2020-09-23 PROCEDURE — 93296 REM INTERROG EVL PM/IDS: CPT

## 2020-09-23 PROCEDURE — 93294 REM INTERROG EVL PM/LDLS PM: CPT

## 2020-09-28 ENCOUNTER — NON-APPOINTMENT (OUTPATIENT)
Age: 70
End: 2020-09-28

## 2020-09-28 ENCOUNTER — APPOINTMENT (OUTPATIENT)
Dept: CARDIOLOGY | Facility: CLINIC | Age: 70
End: 2020-09-28
Payer: MEDICARE

## 2020-09-28 ENCOUNTER — APPOINTMENT (OUTPATIENT)
Dept: OPHTHALMOLOGY | Facility: CLINIC | Age: 70
End: 2020-09-28
Payer: MEDICARE

## 2020-09-28 VITALS
HEART RATE: 91 BPM | OXYGEN SATURATION: 99 % | SYSTOLIC BLOOD PRESSURE: 180 MMHG | WEIGHT: 186 LBS | DIASTOLIC BLOOD PRESSURE: 98 MMHG | TEMPERATURE: 97.5 F | BODY MASS INDEX: 24.54 KG/M2

## 2020-09-28 PROCEDURE — 92004 COMPRE OPH EXAM NEW PT 1/>: CPT

## 2020-09-28 PROCEDURE — 99215 OFFICE O/P EST HI 40 MIN: CPT

## 2020-09-28 PROCEDURE — 93000 ELECTROCARDIOGRAM COMPLETE: CPT

## 2020-09-28 PROCEDURE — 92020 GONIOSCOPY: CPT

## 2020-09-28 NOTE — DISCUSSION/SUMMARY
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 [Cardiomyopathy] : cardiomyopathy [Improving] : improving [Responding to Treatment] : responding to treatment [NYHA Class II] : NYHA Class II [Hypertensive Cardiomyopathy] : hypertensive cardiomyopathy [Coronary Artery Disease] : coronary artery disease [Chronic Systolic Heart Failure] : chronic systolic congestive heart failure [Compensated] : compensated [Essential Hypertension] : essential hypertension [Deteriorating] : deteriorating [Medication Changes Per Orders] : as documented in orders [Patient] : the patient [Peripheral Vascular Disease] : peripheral vascular disease [Stable] : stable [JAM] : an ankle-brachial index [None] : none [de-identified] : Pacemaker interrogation of Bi-Ventricular CRT device [de-identified] : on aspirin and statin [de-identified] : ventricular function improved with CRT, restored sinus rhythm, GDMT. [de-identified] : furosemide 0 mg daily, stopped hydralazine, continue lisinopril, continue spironolactone at lower dose [de-identified] : increase carvedilol to 12.5 mg BID [FreeTextEntry2] : daughter

## 2020-09-28 NOTE — PHYSICAL EXAM
[General Appearance - Well Developed] : well developed [Normal Appearance] : normal appearance [Well Groomed] : well groomed [General Appearance - Well Nourished] : well nourished [No Deformities] : no deformities [General Appearance - In No Acute Distress] : no acute distress [Normal Conjunctiva] : the conjunctiva exhibited no abnormalities [Eyelids - No Xanthelasma] : the eyelids demonstrated no xanthelasmas [Normal Oral Mucosa] : normal oral mucosa [No Oral Pallor] : no oral pallor [No Oral Cyanosis] : no oral cyanosis [JVD Elevated _____cm] : JVD elevated [unfilled] ~U cm above clavicle [Respiration, Rhythm And Depth] : normal respiratory rhythm and effort [Exaggerated Use Of Accessory Muscles For Inspiration] : no accessory muscle use [Auscultation Breath Sounds / Voice Sounds] : lungs were clear to auscultation bilaterally [Normal Rate] : normal [Rhythm Regular] : regular [Normal S1] : normal S1 [Normal S2] : normal S2 [No Gallop] : no gallop heard [Holosystolic] : holosystolic [LSB] : the murmur was transmitted to the LSB [II] : a grade 2 [Crescendo-Decrescendo] : crescendo-decrescendo [Early] : early [2+] : left 2+ [1+] : left 1+ [0] : left 0 [No Abnormalities] : the abdominal aorta was not enlarged and no bruit was heard [No Pitting Edema] : no pitting edema present [Abdomen Soft] : soft [Abdomen Tenderness] : non-tender [Abdomen Mass (___ Cm)] : no abdominal mass palpated [Abnormal Walk] : normal gait [Gait - Sufficient For Exercise Testing] : the gait was sufficient for exercise testing [Nail Clubbing] : no clubbing of the fingernails [Cyanosis, Localized] : no localized cyanosis [Petechial Hemorrhages (___cm)] : no petechial hemorrhages [Skin Color & Pigmentation] : normal skin color and pigmentation [] : no rash [No Venous Stasis] : no venous stasis [Skin Lesions] : no skin lesions [No Skin Ulcers] : no skin ulcer [No Xanthoma] : no  xanthoma was observed [Oriented To Time, Place, And Person] : oriented to person, place, and time [Affect] : the affect was normal [Mood] : the mood was normal [No Anxiety] : not feeling anxious [Right Carotid Bruit] : no bruit heard over the right carotid [Left Carotid Bruit] : no bruit heard over the left carotid

## 2020-09-28 NOTE — HISTORY OF PRESENT ILLNESS
[FreeTextEntry1] : Mr. Lon Skaggs is a 70 year old man with a history of ACC/AHA Stage C HF with borderline EF (LV 5.5 cm, EF 45%) likely from hypertensive cardiomyopathy with severe functional MR. 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 He was roughly 30 lbs above his last discharge weight and the trigger is not taking his medications for 3 months because he felt well and did not refill prescriptions. He was also found to have atrial flutter which is a new diagnosis. Successfully diuresed 40 lbs in hospital and underwent CRT-P upgrade from [facility] due to high pacing burden. Had TE guided cardioversion and remained in sinus rhythm. Continues to feel well, now fully active, able to walk quickly up flights of stairs, walk good distances all without dyspnea and denies orthopnea. \par \par At a prior visit found serum potassium elevated and endocrine had instructed to increase spironolactone, but contacted him to remain unchanged and avoid high potassium sources in diet. Since then doing very well, active, no dyspnea, there is no orthopnea or paroxysmal nocturnal dyspnea.

## 2020-10-09 NOTE — DISCHARGE NOTE PROVIDER - CARE PROVIDERS DIRECT ADDRESSES
NEUROLOGy FOLLOW-UP        Patient: Angeli Perry Date of Service: 10/9/2020   : 1977 MRN: [id number]     SUBJECTIVE:   HISTORY SINCE LAST VISIT:  Angeli Perry is a 43 year old female w/ hx of CIDP which she had in , who presents today for follow-up.  Last received IVIGs on ~2020 and ~2020(35 g over 2 days).  Just prior to the infusions, feels slower and heavier and more effortful w/ movements.  However, admits to stress around these times.  No new tingling or numbness.  No other complaints or issues; has been able to walk several times a week on treadmill, 30 min at a time.  May lose her balance and sway a little when walking faster on this.    Still taking gabapentin 400 mg BID.  Still has occasional sensation of \"dampness\" in feet when walking.    Last visit, 2020:  Angeli Perry is a 43 year old female who presents today for f/u for CIDP. Last saw Dr. Barakat in 2019.  Since that time, pt has received IVIG, 35 g over 2 days, q 9 weeks.    Her last treatment was first week in 2020, which was actually 10 weeks after the previous treatment in January.  By the time she received it, she was starting to feel a little fatigue and tingling in her feet.  Otherwise, symptoms have been stable.  Recently has sensation of feeling \"damp\" in her feet, but no major sensory deficits.  Gait has been stable.    Continues to take gabapentin 400 mg BID.     Last visit with Dr. Barakat, 2019:  \"Last saw May 6, 2019     Seems stable  Does exercise bike since has a bad knee     On:  Gabapentin 400 mg TID  gets dosed IVIG for 2 day course at 35g q 9 weeks     Done q 9 weeks twice     wrist splint have helped hands  Uses prn     -------------------------------------------------------------------------------------  PREVIOUS VISIT:     Last saw  Oct 11, 2018     Stable     If missed Gabapentin can get numbness     Her 's dad past away this past weekend     On:  Gabapentin 400 mg TID  gets  dosed IVIG for 2 day course at 35g q 8 weeks     -------------------------------------------------------------------------------------  PREVIOUS VISIT:     last saw May 11, 2018     the same overall     gets dosed IVIG for 2 day course at 35g      every 8 weeks since 5-6/15     on Gabapentin 800 mg TID..........decreased 400 mg TID     -------------------------------------------------------------------------------------  PREVIOUS VISIT:  last saw Oct 23, 2017     neuro wise is good     bad emotionally since her dad passed and the 6 days later her  did also   unexpected   had a Thoracic aortic dissection  taken to [facility] and was DOA     trying to deal with   her daughters are doing ok (10 and 13 yo)     on.....  IVIG for 2 day course at 35g every 8 weeks since 5-6/15     -------------------------------------------------------------------------------------  PREVIOUS VISIT:  last saw Mar 30, 2017     overall, the same  feels it wears off the week prior     on......  Gabapentin 400 mg TID  IVIG.......gets dosed IVIG for 2 day course at 35g every 8 weeks     hand numbness come and goes and related to what she does     -------------------------------------------------------------------------------------  PREVIOUS VISIT:  last saw Aug 15, 2016     since 1/17 hands are numb when uses them  no pain   hand do not wake her up at night  wrist splint have helped hands     otherwise the same     pain in her foot     on.......  Neurontin 800 mg TID   IVIG for 2 day course at 35g every 8 weeks     -------------------------------------------------------------------------------------  PREVIOUS VISIT:  last saw Feb 15, 2016     getting IVIG every 8 weeks     feels poorly 1 week prior to infusion.......paresthesias, pain, no energy     on Neurontin 800 mg TID     balance issue the same  not perfect but she adapts     -------------------------------------------------------------------------------------  PREVIOUS VISIT:  last saw  Aug 17, 2015     feels good     since 1/16  able to exercise more     gets dosed IVIG for 2 day course at 35g every 8 weeks     on........  Neurontin 800mg TID     wrist splint have helped hands     -------------------------------------------------------------------------------------  PREVIOUS VISIT:  Last saw May 11, 2015     overall, the same  still feels worse towards the end  not worse then in the past year though     on.......  Neurontin 800mg TID  IVIG every 8 weeks     -------------------------------------------------------------------------------------  PREVIOUS VISIT:  last saw Feb 09, 2015  feels the same     does get tired but no different than when was on IVIG every 4 weeks     on Neurontin 800mg TID  wants to see if can cut it down     gets dosed IVIG for 2 day course at 35g every 7 weeks   did on 9/17-18/14, 11/5-6/14, 12/22-23/14, 2/11-12/15     -------------------------------------------------------------------------------------  PREVIOUS VISIT:  last saw Nov 10, 2014     gets dosed IVIG for 2 day course at 35g every every 6 weeks since 1/14 and 8/14   prior was every 5 weeks and prior every 4 weeks  trying every 7 weeks   and did on 9/17-18/14, 11/5-6/14, 12/22-23/14, 2/11-12/15     tired but not worse  when dosed fells a lot better after 2 day  tiredness and toe numbness the week before IVIG     on Neurontin 800mg TID  -------------------------------------------------------------------------------------  PREVIOUS VISIT:  last saw Aug 04, 2014  IS THE SAME  new issue and has low back pain     gets dosed IVIG for 2 day course at 35g every every 6 weeks since 1/14 and 8/14      will try every 7 weeks (prior was every 5 weeks and prior every 4 weeks)  9/17-18/14, 11/5-6/14     if cannot extend IVIG will need steroid sparing agent     not doing Gilenya trial at  of  since too much time commitment  -------------------------------------------------------------------------------------  PREVIOUS  VISIT:  last saw May 05, 2014  overall, the same     recently, fatigue  had strep throat  got Abx     gets dosed IVIG for 2 day course at 35g every every 6 weeks since 1/14  -------------------------------------------------------------------------------------  PREVIOUS VISIT:  last saw Feb 05, 2014  about the same     tired more though     gets dosed IVIG for 2 day course at 35g every 6 weeks since  -------------------------------------------------------------------------------------  PREVIOUS VISIT:  last saw Nov 04, 2013  now getting every 6 weeks  feels the same BUT has alessandra  12/30 and 1/2 got 2 days after waiting for 6 weeks for the first time     IVIG for 2 day course at 35g      Neurontin 800mg TID     get PT for pelvic exercises  -------------------------------------------------------------------------------------  PREVIOUS VISIT:  last saw Aug 05, 2013  went form every 4 to 5 weeks in 9/13  feels ok  not worse  did have a cold and did really feels a lot worse     gets IVIG for 2 day course at 35g   -------------------------------------------------------------------------------------  PREVIOUS VISIT:  last saw May 06, 2013  gets 35g IV QD x 2 days every mo (for last 5 mo's)  at end gets fatigued and balance is off     last 1/2 of the the last week (week 4 after infusion)     not to do Gilenya trial at [facility] since too much of a time commitment  -------------------------------------------------------------------------------------  INITIAL VISIT:  35 WF RH  use to see Dr. Canseco  2nd opinion at [facility] (Dr. Hays)  agreed with CIDP     3/12/12 awoke with hand paresthesias  1 week in hand then into feet  2nd week legs were heavy, hands weak  poor balance  did have facial and tongue numbness  saw PCP and did MRI's and Rx Prednisone and Neurontin  saw Neurosurg. (saw Dr. Weldon)  saw Dr. 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  even when was at 4 days (and also 2 days) a mo feels it wear off 1 week prior     never did LP\"  PAST MEDICAL HISTORY:  Past Medical History:   Diagnosis Date   • Arthritis unknown    elbow, right knee   • Atypical squamous cell changes of undetermined significance (ASCUS) on cervical cytology with negative high risk human papilloma virus (HPV) test result 2013    had normal bx- 2013   • Dermatitis 02/22/2016    right hand- remitted in 2016   • Ectopic pregnancy 2004   • Genital herpes    • GERD (gastroesophageal reflux disease) Unk   • Hypercholesteremia    • Neuromuscular disorder (CMS/[facility]) 3/2012    CIDP       MEDICATIONS:  Current Outpatient Medications   Medication Sig   • famotidine (Pepcid) 20 MG tablet Take 1 tablet by mouth daily.   • naproxen (NAPROSYN) 500 MG tablet Take 1 tablet by mouth 2 times daily.   • gabapentin (NEURONTIN) 400 MG capsule Take 1 capsule by mouth 3 times daily. (Patient taking differently: Take 400 mg by mouth 2 times daily. )   • atorvastatin (LIPITOR) 40 MG tablet TAKE 1 TABLET BY MOUTH EVERY DAY   • Immune Globulin, Human, (IMMUNE GLOBULIN, SUCROSE FREE,) 5 GM/50ML Solution Give 35gm for two days every 8 weeks. (Patient taking differently: Give 35gm for two days every 10 weeks.)   • Multiple Vitamins-Minerals (MULTIVITAMIN ADULT PO)    • acetaminophen (TYLENOL) 325 MG tablet Take 625mg of tylenol prior to monthly IVIG infusion.   • Fiber, Guar Gum, Chew Tab    • ibuprofen (MOTRIN) 200 MG tablet Take 200 mg by mouth.   • fluticasone (FLONASE) 50 MCG/ACT nasal spray      No current facility-administered medications for this visit.         ALLERGIES:  ALLERGIES:  No Known Allergies        OBJECTIVE:     Visit Vitals  /80 (BP Location: LUE - Left upper extremity, Patient Position: Sitting, Cuff Size: Large Adult)   Pulse 67   Temp 98 °F (36.7 °C) (Tympanic)   Resp 17   Ht 5' 8\" (1.727 m)   Wt 89.8 kg (198 lb)   LMP 10/08/2020   BMI 30.11 kg/m²       Neurological exam:  Awake, alert, oriented in all aspects. No cognitive deficits.  Both pupils equal, round, reactive to light.  Bilateral extraocular movements intact.  Visual fields full bilaterally.  Smile symmetrical, tongue midline.    Grossly 5/5 in all extremities, including dorsiflexion and plantarflexion. Knee extension and flexion may be 5-/5.    Intact to LT in distal BUEs and BLEs; hypersensitive to PP distal to wrists and ankles bilaterally(which is baseline since having CIDP).  JPS intact bilaterally; vibration intact in patellars and ankles, but absent at toes     DTRs 2-/4 for patellars and 2/4 L Achilles' and R 2/4  Gait was ok; can tandem, can toe and heel walk.    DIAGNOSTIC STUDIES:   LAB RESULTS:    Lab Results Reviewed, Diagnostic Data Reviewed and   Lab Services on 09/09/2020   Component Date Value Ref Range Status   • Fasting Status 09/09/2020 UNKNOWN  hrs Final   • Sodium 09/09/2020 138  135 - 145 mmol/L Final   • Potassium 09/09/2020 4.2  3.4 - 5.1 mmol/L Final   • Chloride 09/09/2020 105  98 - 107 mmol/L Final   • Carbon Dioxide 09/09/2020 27  21 - 32 mmol/L Final   • Anion Gap 09/09/2020 10  10 - 20 mmol/L Final   • Glucose 09/09/2020 99  65 - 99 mg/dL Final   • BUN 09/09/2020 15  6 - 20 mg/dL Final   • Creatinine 09/09/2020 0.68  0.51 - 0.95 mg/dL Final   • GFR Estimate,  09/09/2020 >90   Final   • GFR Estimate, Non  09/09/2020 >90   Final   • BUN/Creatinine Ratio 09/09/2020 22  7 - 25 Final   • CALCIUM 09/09/2020 9.2  8.4 - 10.2 mg/dL Final   • TOTAL BILIRUBIN 09/09/2020 1.0  0.2 - 1.0 mg/dL Final   • AST/SGOT 09/09/2020 28  <38  Units/L Final   • ALT/SGPT 2020 48  <64 Units/L Final   • ALK PHOSPHATASE 2020 61  45 - 117 Units/L Final   • TOTAL PROTEIN 2020 7.9  6.4 - 8.2 g/dL Final   • Albumin 2020 3.8  3.6 - 5.1 g/dL Final   • GLOBULIN 2020 4.1* 2.0 - 4.0 g/dL Final   • A/G Ratio, Serum 2020 0.9* 1.0 - 2.4 Final   • FASTING STATUS 2020 UNKNOWN  hrs Final   • CHOLESTEROL 2020 167  <200 mg/dL Final   • CALCULATED LDL 2020 92  <130 mg/dL Final   • HDL 2020 50  >49 mg/dL Final   • TRIGLYCERIDE 2020 127  <150 mg/dL Final   • CALCULATED NON HDL 2020 117  mg/dL Final   • CHOL/HDL 2020 3.3  <4.5 Final   • WBC 2020 8.8  4.2 - 11.0 K/mcL Final   • RBC 2020 4.60  4.00 - 5.20 mil/mcL Final   • HGB 2020 14.4  12.0 - 15.5 g/dL Final   • HCT 2020 42.7  36.0 - 46.5 % Final   • MCV 2020 92.8  78.0 - 100.0 fl Final   • MCH 2020 31.3  26.0 - 34.0 pg Final   • MCHC 2020 33.7  32.0 - 36.5 g/dL Final   • RDW-CV 2020 12.9  11.0 - 15.0 % Final   • PLT 2020 230  140 - 450 K/mcL Final   • NRBC 2020 0  0 /100 WBC Final   • Hemoglobin A1C 2020 5.4  4.5 - 5.6 % Final   Office Visit on 2019   Component Date Value Ref Range Status   • PATH REPORT, PAP 2019    Corrected                    Value:Name: CECIL DELACRUZ               MRN:     [id number]    :  1977                     Visit#:  [id number]                            Gynecologic Cytology Consultation Report        Client: RENETTA MCCLELLAN [address]        Date Specimen Collected: 19            Accession #:  [id number]    Date Specimen Received:  19            Requisition #:[id number]    Date Reported:           2019 12:06    Location:     [facility]                            * Addendum Present *    ______________________________________________________________________________    Cytologic Interpretation :         Negative for intraepithelial lesion or malignancy.          Satisfactory for evaluation.  Presence of endocervical/transformation zone    component.    Primary and secondary screening of this case was performed at [facility], [address] [place] [address].            Lydia Nazario, CT(ASCP)    Rufino Lepe CT(ASCP)                              ** Electronic Signature (JSS) 11/26/2019   12:06 **        Educational note:  The Pap test is a screening test with a well-recognized    false negative rate.  The best means available to lower the false negative    rate and to detect early cervical lesions is a Pap test at regular intervals.     All ThinPrep Paps will be reviewed with the aid of the ThinPrep Imaging    System, unless otherwise specified.        ______________________________________________________________________________    Clinical Information:    LMP: [id number]    Treatment Hx:  Colposcopy    Infection Hx:  Human Papilloma Virus    Other Clinical Conditions:Pap source: Endocervical    REASON FOR COLLECTION::LOW RISK - ENCOUNTER FOR SCREENING FOR MALIGNANT    NEOPLASM OF CERVIX Z12.4    SOURCE::ENDOCERVICAL        Specimen(s) Submitted:     PAP with HPV Reflex (ThinPrep only)        Procedures/Addenda:    HPV, High Risk ADD ON_IL:    Date Ordered:     11/27/2019     Date Reported:11/29/2019        Interpretation                              Aptima HPV HIGH RISK (TYPES 16,18,31,33,35,39,45,51,52,56,58,59,66,68):    NEGATIVE        The APTIMA HPV Assay is an FDA approved in vitro nucleic acid amplification    test for the qualitative detection of E6/E7 viral messenger RNA (mRNA) from 14    high-risk types of human papillomavirus (HPV) in cervical specimens. The    high-risk HPV types detected by the assay include: 16, 18, 31, 33, 35, 39, 45,    51, 52, 56, 58, 59, 66, and 68. The Aptima HPV Assay does not discriminate    between the 14 high-risk types. Cervical  specimens in the Thin Prep Pap Test    vials containing PreservCyt Solution and collected with broom-type or    cytobrush/spatula collection devices may be tested with this assay using the    [employer] System.         The APTIMA HPV Assay is intended to screen women 21 years and older with    atypical squamous cells of undetermined significance (ASC-US) cervical    cytology results to determine the need for referral to colposcopy. Test    results are not intended to                           prevent women from proceeding to colposcopy.         In women 30 years and older, the above assay can be used with cervical    cytology to adjunctively screen to assess the presence or absence of high-risk    HPV types. This information, with the physician's assessment of cytology    history, other risk factors, and guidelines, may be used to guide patient    management.                                         ** Electronic Signature ** ([facility]) 11/29/2019 13:10 **                ICD Codes:     Z01.419 Z12.31 R92.2        Fee Codes:     A: [id number]     HPVADD_IL: [id number]        Performing Lab Location (Unless otherwise specified):    [facility]    [address] [place] [address]. [phone]            Assessment AND PLAN:   This is a 43 year old year-old female who presents with chronic inflammatory demyelinating polyneuropathy.  Is grossly stable overall with her gait and strength.  Patient was exhorted to be careful and to maintain proper precautions(keeping distance from people, washing her hands thoroughly), given the viral pandemic and that her IVIG treatments may may her more susceptible to infection.     1.  gets dosed IVIG for 2 day course at 35g      prior every 4 weeks and then  prior was every 5 weeks and then  prior was every 6 weeks since 1/14 to 8/14  prior was every 7 weeks  Prior every 8 weeks since 5-6/15  Prior every 9 weeks since ~5/19;   Now every 10 wks since 1/2020; next infusion is in  beginning of 11/2020.   Will continue every 10 weeks for now and monitor Cr. If kidney functioning and gait and strength remain stable(pt still reports feeling generalized fatigue but no sensory disturbances toward end of 10 wks), then will consider extending to every 11 weeks.      2.  if cannot extend IVIG, then may need steroid sparing agent   3.  not doing Gilenya trial at [facility] since too much time commitment  4.  on Gabapentin 400 mg BID  5.  f/u 6 mo's       Chronic inflammatory demyelinating polyneuropathy (CMS/HCC)      Return in about 6 months (around 4/9/2021).    Instructions provided as documented in the AVS.    The patient indicated understanding of the diagnosis and agreed with the plan of care.    Greater than 50% of the visit was spent counseling regarding above issues; total time spent 17 minutes.   ,[email or web address],DirectAddress_Unknown ,[email or web address],[email or web address],[email or web address] ,[email or web address],DirectAddress_Unknown,[email or web address],DirectAddress_Unknown

## 2020-10-15 ENCOUNTER — APPOINTMENT (OUTPATIENT)
Dept: OPHTHALMOLOGY | Facility: CLINIC | Age: 70
End: 2020-10-15

## 2020-11-07 ENCOUNTER — LABORATORY RESULT (OUTPATIENT)
Age: 70
End: 2020-11-07

## 2020-11-10 NOTE — PROGRESS NOTE ADULT - PROBLEM/PLAN-9
Left arm;
DISPLAY PLAN FREE TEXT

## 2020-11-11 ENCOUNTER — APPOINTMENT (OUTPATIENT)
Dept: INTERNAL MEDICINE | Facility: CLINIC | Age: 70
End: 2020-11-11
Payer: MEDICARE

## 2020-11-11 VITALS
SYSTOLIC BLOOD PRESSURE: 140 MMHG | WEIGHT: 193 LBS | BODY MASS INDEX: 25.58 KG/M2 | DIASTOLIC BLOOD PRESSURE: 80 MMHG | HEIGHT: 73 IN

## 2020-11-11 DIAGNOSIS — Z85.72 PERSONAL HISTORY OF NON-HODGKIN LYMPHOMAS: ICD-10-CM

## 2020-11-11 PROCEDURE — 90662 IIV NO PRSV INCREASED AG IM: CPT

## 2020-11-11 PROCEDURE — G0008: CPT

## 2020-11-11 PROCEDURE — 99072 ADDL SUPL MATRL&STAF TM PHE: CPT

## 2020-11-11 PROCEDURE — 99214 OFFICE O/P EST MOD 30 MIN: CPT | Mod: 25

## 2020-11-11 NOTE — HISTORY OF PRESENT ILLNESS
[FreeTextEntry1] : CHF, A. flutter, CKD, DM, thyroid nodules, HTN, and hypercholesterol\par  [de-identified] : no complaints\par

## 2020-11-11 NOTE — ASSESSMENT
[FreeTextEntry1] : repeat EE=033/76.  Continue same blood pressure medication.  Follow blood pressure.\par CHF-stable.  per card.  Continue same medications.\par Continue same cholesterol medication.  Follow lipid.  chol is higher c/w 7/2020\par follow A1C.  per endo\par thyroid nodules-per endo\par follow CBC\par follow BUN/creat

## 2020-12-02 ENCOUNTER — APPOINTMENT (OUTPATIENT)
Dept: ENDOCRINOLOGY | Facility: CLINIC | Age: 70
End: 2020-12-02

## 2020-12-23 ENCOUNTER — APPOINTMENT (OUTPATIENT)
Dept: ELECTROPHYSIOLOGY | Facility: CLINIC | Age: 70
End: 2020-12-23
Payer: MEDICARE

## 2020-12-23 PROCEDURE — 93296 REM INTERROG EVL PM/IDS: CPT

## 2020-12-23 PROCEDURE — 93294 REM INTERROG EVL PM/LDLS PM: CPT

## 2020-12-23 NOTE — DIETITIAN INITIAL EVALUATION ADULT. - PROBLEM SELECTOR PROBLEM 4
[facility] Chemo Progress Note    Date: 2020    Name: Myke Pool    MRN: [id number]         : 1950    0950 Mr. Canales Arrived to [address] [place] [address] for  D1 C12 Bortezomib (velcade) ambulatory in stable condition. Assessment was completed, no acute issues at this time, no new complaints voiced. Labs drawn and sent for processing. (peripheral labs)    Chemotherapy Flowsheet 2020   Cycle C 12   Date 2020   Drug / Regimen Velcade   Dosage -   Pre Hydration -   Post Hydration -   Pre Meds -   Notes RLQ         Patient Denies SOB, fever, cough, general not feeling well. Patient Denies recent exposure to someone who has tested positive for COVID-19. Patient Denies having contact with anyone who has a pending COVID test.      Mr. Canales's vitals were reviewed. Patient Vitals for the past 12 hrs:   Temp Pulse Resp BP   20 1213 -- 82 -- 139/80   20 0950 97.5 °F (36.4 °C) 85 18 (!) 142/85         Lab results were obtained and reviewed. Recent Results (from the past 12 hour(s))   CBC WITH AUTOMATED DIFF    Collection Time: 20  9:54 AM   Result Value Ref Range    WBC 6.1 4.1 - 11.1 K/uL    RBC 3.70 (L) 4.10 - 5.70 M/uL    HGB 12.8 12.1 - 17.0 g/dL    HCT 36.5 (L) 36.6 - 50.3 %    MCV 98.6 80.0 - 99.0 FL    MCH 34.6 (H) 26.0 - 34.0 PG    MCHC 35.1 30.0 - 36.5 g/dL    RDW 14.6 (H) 11.5 - 14.5 %    PLATELET 693 727 - 228 K/uL    MPV 9.9 8.9 - 12.9 FL    NRBC 0.0 0  WBC    ABSOLUTE NRBC 0.00 0.00 - 0.01 K/uL    NEUTROPHILS 66 32 - 75 %    LYMPHOCYTES 19 12 - 49 %    MONOCYTES 11 5 - 13 %    EOSINOPHILS 2 0 - 7 %    BASOPHILS 1 0 - 1 %    IMMATURE GRANULOCYTES 1 (H) 0.0 - 0.5 %    ABS. NEUTROPHILS 4.1 1.8 - 8.0 K/UL    ABS. LYMPHOCYTES 1.2 0.8 - 3.5 K/UL    ABS. MONOCYTES 0.7 0.0 - 1.0 K/UL    ABS. EOSINOPHILS 0.1 0.0 - 0.4 K/UL    ABS. BASOPHILS 0.0 0.0 - 0.1 K/UL    ABS. IMM.  GRANS. 0.0 0.00 - 0.04 K/UL    DF AUTOMATED     METABOLIC PANEL, COMPREHENSIVE    Collection Time: 12/23/20  9:54 AM   Result Value Ref Range    Sodium 139 136 - 145 mmol/L    Potassium 4.0 3.5 - 5.1 mmol/L    Chloride 108 97 - 108 mmol/L    CO2 27 21 - 32 mmol/L    Anion gap 4 (L) 5 - 15 mmol/L    Glucose 114 (H) 65 - 100 mg/dL    BUN 36 (H) 6 - 20 MG/DL    Creatinine 1.95 (H) 0.70 - 1.30 MG/DL    BUN/Creatinine ratio 18 12 - 20      GFR est AA 42 (L) >60 ml/min/1.73m2    GFR est non-AA 34 (L) >60 ml/min/1.73m2    Calcium 9.7 8.5 - 10.1 MG/DL    Bilirubin, total 0.5 0.2 - 1.0 MG/DL    ALT (SGPT) 40 12 - 78 U/L    AST (SGOT) 24 15 - 37 U/L    Alk. phosphatase 224 (H) 45 - 117 U/L    Protein, total 6.9 6.4 - 8.2 g/dL    Albumin 3.7 3.5 - 5.0 g/dL    Globulin 3.2 2.0 - 4.0 g/dL    A-G Ratio 1.2 1.1 - 2.2         Injection given in RLQ  Medications Administered     bortezomib (VELCADE) 2.43 mg in 0.9% sodium chloride SQ chemo syringe     Admin Date  12/23/2020 Action  Given Dose  2.43 mg Route  SubCUTAneous Administered By  Connie Godoy RN                  9800 Patient tolerated treatment well.    Patient was discharged from [address] [place] [address]   Date Time Provider [facility]   1/6/2021 10:00 AM Mel[facility] H   1/20/2021 10:00 AM H2 GARIMA FASTRACK [facility]   2/3/2021 10:00 AM H2 GARIMA FASTRACK [facility]   2/3/2021 10:15 AM Babita Spring  [address] BS AMB   2/17/2021 10:00 AM H2 [facility]ARIMA FASTRACK [facility]   3/3/2021 10:00 AM H2 [facility]ARIMA FASTRACK [facility]   4/13/2021 11:00 AM Fallon Magana NP [facility] BS AMB         Ashely Vail, DEANDRE  December 23, 2020 Non-Hodgkin lymphoma of lymph nodes of neck, unspecified non-Hodgkin lymphoma type

## 2020-12-28 ENCOUNTER — NON-APPOINTMENT (OUTPATIENT)
Age: 70
End: 2020-12-28

## 2020-12-28 ENCOUNTER — APPOINTMENT (OUTPATIENT)
Dept: CARDIOLOGY | Facility: CLINIC | Age: 70
End: 2020-12-28
Payer: MEDICARE

## 2020-12-28 VITALS
HEART RATE: 80 BPM | SYSTOLIC BLOOD PRESSURE: 150 MMHG | BODY MASS INDEX: 25.46 KG/M2 | DIASTOLIC BLOOD PRESSURE: 82 MMHG | WEIGHT: 193 LBS | OXYGEN SATURATION: 96 %

## 2020-12-28 VITALS — OXYGEN SATURATION: 95 % | SYSTOLIC BLOOD PRESSURE: 150 MMHG | DIASTOLIC BLOOD PRESSURE: 80 MMHG | HEART RATE: 87 BPM

## 2020-12-28 PROCEDURE — 99215 OFFICE O/P EST HI 40 MIN: CPT

## 2020-12-28 PROCEDURE — 93000 ELECTROCARDIOGRAM COMPLETE: CPT

## 2020-12-28 PROCEDURE — 99072 ADDL SUPL MATRL&STAF TM PHE: CPT

## 2020-12-28 NOTE — DISCUSSION/SUMMARY
[Anticoagulation] : anticoagulation [Cardiomyopathy] : cardiomyopathy [Improving] : improving [NYHA Class II] : NYHA Class II [Hypertensive Cardiomyopathy] : hypertensive cardiomyopathy [Coronary Artery Disease] : coronary artery disease [Stable] : stable [Chronic Systolic Heart Failure] : chronic systolic congestive heart failure [Essential Hypertension] : essential hypertension [Responding to Treatment] : responding to treatment [Medication Changes Per Orders] : as documented in orders [Patient] : the patient 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 [Severe Mitral Regurgitation] : severe mitral regurgitation [Compensated] : compensated [Dilated Annulus] : dilated annulus [Echocardiogram] : echocardiogram [None] : none [de-identified] : atrial flutter [de-identified] : cardoiversion restored sinus rhythm and maintained [de-identified] : Pacemaker interrogation reviewed and good function, good pacing, nearly 100% bi-ventricular, no pauses [de-identified] : non-obstructive [de-identified] : on aspirin and statin [de-identified] : ventricular function improved with CRT, restored sinus rhythm, GDMT. [de-identified] : furosemide 60 mg daily, stopped hydralazine, continue lisinopril, continue spironolactone at lower dose [de-identified] : not yet optimal [de-identified] : increase carvedilol to 25 mg BID [FreeTextEntry2] : daughter

## 2021-01-16 NOTE — ED PROVIDER NOTE - NS ED ATTENDING STATEMENT MOD
Physical Therapy  Visit Type: initial evaluation  Precautions:    Lines:     Basic: IV      SUBJECTIVE                                                                                                            Patient agreed to participate in therapy this date.  Patient agreeable to physical therapy. States he is not currently in any pain, but did have some discomfort to his stomach and legs last night. Patient lives in 1 story home with 4 steps to enter with 2 railings. Reports he requires increased assistance from his family, mainly son, when at home with day to day tasks such as filling medication, grocery shopping, laundry, and more. He reports history of falls within the past year, but unable to recall when most recent fall occurred. He is concerned with circulation to both of his legs. Aware of wounds to both legs, but denies any discomfort.   Patient / Family Goal: maximize function    Pain   RN informed on pain level      OBJECTIVE                                                                                                                Oriented to person, place and time     Arousal alertness: appropriate responses to stimuli    Affect/Behavior: pleasant and cooperative  Patient activity tolerance: 2 to 1 activity to rest  Functional Communication/Cognition      Attention span:  Appears intact    Commands: follows all commands and directions consistently.    Transition between tasks: transition with cues.    Safety judgement: decreased awareness of need for safety.  Incision/Wound:   - Location: Wounds throughout bilateral lower extremities (below knees) that are covered by dressings.   Posture    Sitting:  Forward head and thoracic kyphosis  Standing:  Forward head and thoracic kyphosis  Range of Motion (measured in degrees unless otherwise noted, active unless indicated)  WFL: RLE, LLE  Strength (out of 5 unless otherwise indicated)   Hip:  Hip Flexion: Left: 4- Right: 4-  Hip Extension: Left: 4-  Right: 4-   - Abduction:      • Left: 4-      • Right: 4-  Knee:   - Flexion:      • Left: 4-      • Right: 4-   - Extension:      • Left: 4      • Right: 4  Ankle:    - Dorsiflexion:      • Left: 4-      • Right: 4-   - Plantar Flexion:      • Left: 4-      • Right: 4-  Comments / Details:  Strength assessed in seated position.  Balance    Sitting: Static: independent, Dynamic: independent    Standing - Firm Surface - Eyes Open: Static: contact guard/touching/steadying assist    Bed Mobility:      Repositioning in bed: modified independent      Supine to sit: supervision    Sit to supine: supervision  Training completed:    Tasks: supine to sit and sit to supine    Education details: patient safety  Transfers:    Assistive devices: 2-wheeled walker and gait belt    Sit to stand: contact guard/touching/steadying assist    Stand to sit: contact guard/touching/steadying assist  Training completed:      Education details: patient safety and patient demonstrates understanding    Educated patient to push up with both hands off surface with sit to stand transfer, rather than reaching and pulling up on 2ww. Educated patient to back legs up to surface and reach hands back to chair prior to sitting with stand to sit transfers.   Gait/Ambulation:     Assistance: contact guard/touching/steadying assist   Assistive device: 2-wheeled walker and gait belt    Distance (ft): 50    Pattern: step to    Type: decreased madonna    Deviation in foot placement: narrow base of support  Training Completed:    Tasks: gait training on level surfaces and assistive device use    Education details: patient safety and patient demonstrates understanding    Cueing provided to patient for improved heel to toe progression, with occasional cueing to increase step size as able.        Interventions                                                                                                       Seated    Lower Extremity: Bilateral: seated hip  flexion, knee flexion, heel raises, knee extensions and hip abduction, AROM, 10 reps, 1 sets  Standing    Lower Extremity: marching, AAROM, 10 reps, 1 sets  Skilled input: Verbal instruction/cues and as detailed above    Instruction/cues for: fully extending knees with seated knee extension. Pushing with equal weight in both feet during bilateral heel raises.  Verbal Consent: Writer verbally educated and received verbal consent for hand placement, positioning of patient, and techniques to be performed today from patient for clothing adjustments for techniques as described above and how they are pertinent to the patient's plan of care.        ASSESSMENT                                                                                                                Impairments: strength, balance deficits, activity tolerance and endurance  Functional Limitations: bed mobility, ambulation, stair climbing and sit to/from stand transfers  Devin is a 89 year old male who was admitted to hospital on 1/15/2021 because of renal failure, shortness of breath, and bilateral LE edema and wounds. Patient presents near baseline level of functional mobility, with bilateral lower extremity weakness. During session, patient was able to perform sit to stand transfers and ambulate 50 feet with 2 wheeled walker and contract guard assist. He did not have any episodes of loss of balance, but required cueing for safety with transfers throughout session. Although patient did present near baseline level of functional mobility, he would likely benefit from skilled nursing placement due to frequent daily assist required from friends/family for safety.    Recommended Consults: PT: None  Discharge Recommendations  Recommendation for Discharge: PT WI: SNF         PT/OT Mobility Equipment for Discharge: Patient owns 2 wheeled walker      PT Identified Barriers to Discharge: Decreased lower extremity strength, balance, endurance     Skilled therapy  is required to address these limitations in attempt to maximize the patient's independence.    Pain at end of session: RN informed on pain level 0/10    End of Session:   Location: in bed  Safety measures: call light within reach and bed rails x2  Handoff to: nurse            PLAN                                                                                                                            Suggestions for next session as indicated: Review safety with sit to stand transfers. Continue to progress LE strengthening with seated and standing exercises. Progress ambulation distance. Stairs if able.    Recommended Consults: PT: None  Discharge Recommendations  Recommendation for Discharge: PT WI: SNF         PT/OT Mobility Equipment for Discharge: Patient owns 2 wheeled walker      PT Identified Barriers to Discharge: Decreased lower extremity strength, balance, endurance     Interventions: balance, bed mobility, endurance training, functional transfer training, gait training, strengthening and safety education  Agreement to plan and goals: patient agrees with goals and treatment plan        GOALS:  Long Term Goals: (to be met by time of discharge from hospital)  Sit to stand: Patient will complete sit to stand transfer with 2-wheeled walker, modified independent and without cues.   Stand to sit: Patient will complete stand to sit transfer with 2-wheeled walker, modified independent and without cues.   Ambulation (even): Patient will ambulate on even surface for 200 feet with 2-wheeled walker, supervision.   3-4 steps: Patient will ambulate 3-4 steps with gait belt using 2 rails, contact guard or touching/steadying.     Documented in the chart in the following areas: Prior Level of Function. Pain. Assessment. Patient Education.         I have personally performed a face to face diagnostic evaluation on this patient. I have reviewed the ACP note and agree with the history, exam and plan of care, except as noted.

## 2021-03-18 ENCOUNTER — NON-APPOINTMENT (OUTPATIENT)
Age: 71
End: 2021-03-18

## 2021-03-18 ENCOUNTER — APPOINTMENT (OUTPATIENT)
Dept: CARDIOLOGY | Facility: CLINIC | Age: 71
End: 2021-03-18
Payer: MEDICARE

## 2021-03-18 ENCOUNTER — APPOINTMENT (OUTPATIENT)
Dept: INTERNAL MEDICINE | Facility: CLINIC | Age: 71
End: 2021-03-18
Payer: MEDICARE

## 2021-03-18 VITALS
TEMPERATURE: 96.9 F | HEART RATE: 85 BPM | WEIGHT: 190 LBS | OXYGEN SATURATION: 98 % | BODY MASS INDEX: 25.07 KG/M2 | SYSTOLIC BLOOD PRESSURE: 168 MMHG | DIASTOLIC BLOOD PRESSURE: 90 MMHG

## 2021-03-18 VITALS
HEIGHT: 73 IN | BODY MASS INDEX: 25.18 KG/M2 | DIASTOLIC BLOOD PRESSURE: 90 MMHG | SYSTOLIC BLOOD PRESSURE: 160 MMHG | WEIGHT: 190 LBS

## 2021-03-18 PROCEDURE — 99072 ADDL SUPL MATRL&STAF TM PHE: CPT

## 2021-03-18 PROCEDURE — 99214 OFFICE O/P EST MOD 30 MIN: CPT | Mod: 25

## 2021-03-18 PROCEDURE — 93000 ELECTROCARDIOGRAM COMPLETE: CPT

## 2021-03-18 PROCEDURE — 36415 COLL VENOUS BLD VENIPUNCTURE: CPT

## 2021-03-18 PROCEDURE — 99215 OFFICE O/P EST HI 40 MIN: CPT

## 2021-03-18 NOTE — PHYSICAL EXAM
[No Acute Distress] : no acute distress [No Respiratory Distress] : no respiratory distress  [No Accessory Muscle Use] : no accessory muscle use [Clear to Auscultation] : lungs were clear to auscultation bilaterally [Normal Rate] : normal rate  [Regular Rhythm] : with a regular rhythm [No Edema] : there was no peripheral edema [Soft] : abdomen soft [Non Tender] : non-tender

## 2021-03-18 NOTE — ASSESSMENT
[FreeTextEntry1] : CMP and CHF-per card\par BP is very high.  pt not sure of his BP meds. follow up in 1 mo.  if BP remains elevated, will adjust BP meds. \par pt will bring all meds at next visit.\par Continue same cholesterol medication.  Fasting lipid.\par A1C\par sees endo for DM and thyroid nodules\par Blood was drawn at office today.\par

## 2021-03-18 NOTE — DISCUSSION/SUMMARY
[Cardiomyopathy] : cardiomyopathy [Improving] : improving [Responding to Treatment] : responding to treatment [NYHA Class II] : NYHA Class II [Hypertensive Cardiomyopathy] : hypertensive cardiomyopathy [Coronary Artery Disease] : coronary artery disease [Essential Hypertension] : essential hypertension [Sodium Restriction] : sodium restriction [Patient] : the patient [Stable] : stable [Anticoagulation] : anticoagulation 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 [Chronic Systolic Heart Failure] : chronic systolic congestive heart failure [Deteriorating] : deteriorating [Severe Mitral Regurgitation] : severe mitral regurgitation [Compensated] : compensated [Dilated Annulus] : dilated annulus [Echocardiogram] : echocardiogram [None] : none [de-identified] : atrial flutter [de-identified] : cardoiversion restored sinus rhythm and maintained [de-identified] : however, stopped taking apixaban and says it is too expensive, but did not contact us to seek accomodation, providing samples and seeking to arrange assistance program [de-identified] : Pacemaker interrogation reviewed and good function, good pacing, nearly 100% bi-ventricular, no pauses [de-identified] : non-obstructive [de-identified] : on aspirin and statin [de-identified] : ventricular function improved with CRT, restored sinus rhythm, GDMT. [de-identified] : continue lisinopril, continue spironolactone [de-identified] : remains above desired range [de-identified] : increased carvedilol to 25 mg BID at a prior visit, but has not taken it or lisinopril today [de-identified] : Too many instances of failing to take medications, today he says it is because going to internist and instructed to not eat for blood tests [FreeTextEntry2] : and daughter

## 2021-03-18 NOTE — HISTORY OF PRESENT ILLNESS
[FreeTextEntry1] : Mr. Lon Skaggs is a 70 year old man with a history of ACC/AHA Stage C HF with borderline EF (LV 5.5 cm, EF 45%) likely from hypertensive cardiomyopathy with severe functional MR. 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 He was roughly 30 lbs above his last discharge weight and the trigger is not taking his medications for 3 months because he felt well and did not refill prescriptions. He was also found to have atrial flutter which was a new diagnosis. Successfully diuresed 40 lbs in hospital and underwent CRT-P upgrade from Micra due to high pacing burden. Had TE guided cardioversion and remained in sinus rhythm. Continues to feel well, now fully active, able to walk quickly up flights of stairs, walk good distances all without dyspnea and denies orthopnea. \par \par At a prior visit found serum potassium elevated and endocrine had instructed to increase spironolactone, but contacted him to remain unchanged and avoid high potassium sources in diet. Since then doing very well, active, no dyspnea, there is no orthopnea or paroxysmal nocturnal dyspnea.

## 2021-03-18 NOTE — HISTORY OF PRESENT ILLNESS
[FreeTextEntry1] : CMP, CHF, CKD, DM, HTN, and hypercholesterol\par  [de-identified] : no complaints\par

## 2021-03-22 ENCOUNTER — NON-APPOINTMENT (OUTPATIENT)
Age: 71
End: 2021-03-22

## 2021-03-22 LAB
ALBUMIN SERPL ELPH-MCNC: 4.6 G/DL
ALP BLD-CCNC: 107 U/L
ALT SERPL-CCNC: 17 U/L
ANION GAP SERPL CALC-SCNC: 14 MMOL/L
AST SERPL-CCNC: 21 U/L
BASOPHILS # BLD AUTO: 0.08 K/UL
BASOPHILS NFR BLD AUTO: 1 %
BILIRUB SERPL-MCNC: 0.6 MG/DL
BUN SERPL-MCNC: 25 MG/DL
CALCIUM SERPL-MCNC: 10.2 MG/DL
CHLORIDE SERPL-SCNC: 102 MMOL/L
CHOLEST SERPL-MCNC: 187 MG/DL
CO2 SERPL-SCNC: 26 MMOL/L
CREAT SERPL-MCNC: 1.69 MG/DL
EOSINOPHIL # BLD AUTO: 0.33 K/UL
EOSINOPHIL NFR BLD AUTO: 4.3 %
ESTIMATED AVERAGE GLUCOSE: 137 MG/DL
GLUCOSE SERPL-MCNC: 118 MG/DL
HBA1C MFR BLD HPLC: 6.4 %
HCT VFR BLD CALC: 43.1 %
HDLC SERPL-MCNC: 28 MG/DL
HGB BLD-MCNC: 13.4 G/DL
IMM GRANULOCYTES NFR BLD AUTO: 0.4 %
LDLC SERPL CALC-MCNC: 128 MG/DL
LYMPHOCYTES # BLD AUTO: 0.93 K/UL
LYMPHOCYTES NFR BLD AUTO: 12.1 %
MAN DIFF?: NORMAL
MCHC RBC-ENTMCNC: 29.7 PG
MCHC RBC-ENTMCNC: 31.1 GM/DL
MCV RBC AUTO: 95.6 FL
MONOCYTES # BLD AUTO: 0.54 K/UL
MONOCYTES NFR BLD AUTO: 7 %
NEUTROPHILS # BLD AUTO: 5.75 K/UL
NEUTROPHILS NFR BLD AUTO: 75.2 %
NONHDLC SERPL-MCNC: 160 MG/DL
PLATELET # BLD AUTO: 170 K/UL
POTASSIUM SERPL-SCNC: 4.1 MMOL/L
PROT SERPL-MCNC: 9.4 G/DL
RBC # BLD: 4.51 M/UL
RBC # FLD: 15 %
SAVE SPECIMEN: NORMAL
SODIUM SERPL-SCNC: 142 MMOL/L
TRIGL SERPL-MCNC: 156 MG/DL
WBC # FLD AUTO: 7.66 K/UL

## 2021-03-23 ENCOUNTER — NON-APPOINTMENT (OUTPATIENT)
Age: 71
End: 2021-03-23

## 2021-03-24 ENCOUNTER — APPOINTMENT (OUTPATIENT)
Dept: ELECTROPHYSIOLOGY | Facility: CLINIC | Age: 71
End: 2021-03-24
Payer: COMMERCIAL

## 2021-03-24 PROCEDURE — 93294 REM INTERROG EVL PM/LDLS PM: CPT

## 2021-03-24 PROCEDURE — 93296 REM INTERROG EVL PM/IDS: CPT

## 2021-04-20 ENCOUNTER — APPOINTMENT (OUTPATIENT)
Dept: INTERNAL MEDICINE | Facility: CLINIC | Age: 71
End: 2021-04-20
Payer: COMMERCIAL

## 2021-04-20 VITALS
DIASTOLIC BLOOD PRESSURE: 90 MMHG | SYSTOLIC BLOOD PRESSURE: 158 MMHG | HEIGHT: 73 IN | BODY MASS INDEX: 25.18 KG/M2 | WEIGHT: 190 LBS

## 2021-04-20 PROCEDURE — 36415 COLL VENOUS BLD VENIPUNCTURE: CPT

## 2021-04-20 PROCEDURE — 99072 ADDL SUPL MATRL&STAF TM PHE: CPT

## 2021-04-20 PROCEDURE — 99214 OFFICE O/P EST MOD 30 MIN: CPT | Mod: 25

## 2021-04-23 ENCOUNTER — NON-APPOINTMENT (OUTPATIENT)
Age: 71
End: 2021-04-23

## 2021-04-23 LAB
ALBUMIN SERPL ELPH-MCNC: 4.4 G/DL
ALP BLD-CCNC: 108 U/L
ALT SERPL-CCNC: 9 U/L
ANION GAP SERPL CALC-SCNC: 13 MMOL/L
AST SERPL-CCNC: 14 U/L
BILIRUB SERPL-MCNC: 0.6 MG/DL
BUN SERPL-MCNC: 19 MG/DL
CALCIUM SERPL-MCNC: 9.7 MG/DL
CHLORIDE SERPL-SCNC: 102 MMOL/L
CO2 SERPL-SCNC: 26 MMOL/L
CREAT SERPL-MCNC: 1.6 MG/DL
GLUCOSE SERPL-MCNC: 128 MG/DL
POTASSIUM SERPL-SCNC: 4.6 MMOL/L
PROT SERPL-MCNC: 8.9 G/DL
SODIUM SERPL-SCNC: 142 MMOL/L

## 2021-04-28 ENCOUNTER — TRANSCRIPTION ENCOUNTER (OUTPATIENT)
Age: 71
End: 2021-04-28

## 2021-05-04 ENCOUNTER — APPOINTMENT (OUTPATIENT)
Dept: INTERNAL MEDICINE | Facility: CLINIC | Age: 71
End: 2021-05-04
Payer: COMMERCIAL

## 2021-05-04 ENCOUNTER — LABORATORY RESULT (OUTPATIENT)
Age: 71
End: 2021-05-04

## 2021-05-04 PROCEDURE — 36415 COLL VENOUS BLD VENIPUNCTURE: CPT

## 2021-05-04 PROCEDURE — 99072 ADDL SUPL MATRL&STAF TM PHE: CPT

## 2021-05-07 LAB
ALBUMIN MFR SERPL ELPH: 49.1 %
ALBUMIN SERPL-MCNC: 4.4 G/DL
ALBUMIN/GLOB SERPL: 1 RATIO
ALPHA1 GLOB MFR SERPL ELPH: 3.9 %
ALPHA1 GLOB SERPL ELPH-MCNC: 0.3 G/DL
ALPHA2 GLOB MFR SERPL ELPH: 10.5 %
ALPHA2 GLOB SERPL ELPH-MCNC: 0.9 G/DL
B-GLOBULIN MFR SERPL ELPH: 9.7 %
B-GLOBULIN SERPL ELPH-MCNC: 0.9 G/DL
GAMMA GLOB FLD ELPH-MCNC: 2.4 G/DL
GAMMA GLOB MFR SERPL ELPH: 26.8 %
INTERPRETATION SERPL IEP-IMP: NORMAL
M PROTEIN MFR SERPL ELPH: NORMAL
MONOCLON BAND OBS SERPL: NORMAL
PROT SERPL-MCNC: 8.9 G/DL
PROT SERPL-MCNC: 8.9 G/DL

## 2021-06-22 ENCOUNTER — APPOINTMENT (OUTPATIENT)
Dept: ELECTROPHYSIOLOGY | Facility: CLINIC | Age: 71
End: 2021-06-22
Payer: MEDICARE

## 2021-06-22 ENCOUNTER — NON-APPOINTMENT (OUTPATIENT)
Age: 71
End: 2021-06-22

## 2021-06-22 PROCEDURE — 93294 REM INTERROG EVL PM/LDLS PM: CPT

## 2021-06-22 PROCEDURE — 93296 REM INTERROG EVL PM/IDS: CPT

## 2021-06-24 ENCOUNTER — APPOINTMENT (OUTPATIENT)
Dept: CARDIOLOGY | Facility: CLINIC | Age: 71
End: 2021-06-24
Payer: MEDICARE

## 2021-06-24 ENCOUNTER — NON-APPOINTMENT (OUTPATIENT)
Age: 71
End: 2021-06-24

## 2021-06-24 VITALS
DIASTOLIC BLOOD PRESSURE: 80 MMHG | RESPIRATION RATE: 17 BRPM | SYSTOLIC BLOOD PRESSURE: 153 MMHG | BODY MASS INDEX: 25.31 KG/M2 | HEART RATE: 69 BPM | TEMPERATURE: 97.9 F | OXYGEN SATURATION: 98 % | WEIGHT: 191 LBS | HEIGHT: 73 IN

## 2021-06-24 VITALS — SYSTOLIC BLOOD PRESSURE: 128 MMHG | DIASTOLIC BLOOD PRESSURE: 72 MMHG | HEART RATE: 72 BPM

## 2021-06-24 DIAGNOSIS — I73.9 PERIPHERAL VASCULAR DISEASE, UNSPECIFIED: ICD-10-CM

## 2021-06-24 PROCEDURE — 93306 TTE W/DOPPLER COMPLETE: CPT

## 2021-06-24 PROCEDURE — 99072 ADDL SUPL MATRL&STAF TM PHE: CPT

## 2021-06-24 PROCEDURE — 99214 OFFICE O/P EST MOD 30 MIN: CPT | Mod: 25

## 2021-06-24 PROCEDURE — 93000 ELECTROCARDIOGRAM COMPLETE: CPT

## 2021-06-24 NOTE — REVIEW OF SYSTEMS
[Negative] : Heme/Lymph [SOB] : no shortness of breath [Dyspnea on exertion] : not dyspnea during exertion [Lower Ext Edema] : no extremity edema [Leg Claudication] : no intermittent leg claudication [Palpitations] : no palpitations [Orthopnea] : no orthopnea [PND] : no PND

## 2021-06-24 NOTE — DISCUSSION/SUMMARY
[Anticoagulation] : anticoagulation 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 [Cardiomyopathy] : cardiomyopathy [Improving] : improving [NYHA Class II] : NYHA Class II [Hypertensive Cardiomyopathy] : hypertensive cardiomyopathy [Echocardiogram] : echocardiogram [Resynchronization Therapy] : resynchronization therapy [Coronary Artery Disease] : coronary artery disease [Stable] : stable [Systolic Heart Failure] : systolic heart failure [Compensated] : compensated [Responding to Treatment] : responding to treatment [Peripheral Vascular Disease] : peripheral vascular disease [JAM] : an ankle-brachial index [Segmental Limb Pressures] : segmental limb pressures [Plethysmography, Lower Ext.] : impedance plethysmography [None] : There are no changes in medication management [Patient] : the patient [de-identified] : atrial flutter [de-identified] : cardoiversion restored sinus rhythm and maintained [de-identified] : Pacemaker interrogation reviewed and good function, good pacing, nearly 100% bi-ventricular, no pauses [de-identified] : Has CRT-D pacemaker [de-identified] : non-obstructive [de-identified] : asymptomatic [FreeTextEntry2] : and daughter

## 2021-06-24 NOTE — REASON FOR VISIT
[Follow-Up - Clinic] : a clinic follow-up of [Cardiomyopathy] : cardiomyopathy [Heart Failure] : congestive heart failure [Cardiac Failure] : cardiac failure [Arrhythmia/ECG Abnorrmalities] : arrhythmia/ECG abnormalities [Hypertension] : hypertension [Coronary Artery Disease] : coronary artery disease [Family Member] : family member [Carotid, Aortic and Peripheral Vascular Disease] : carotid, aortic and peripheral vascular disease

## 2021-06-24 NOTE — PHYSICAL EXAM
[Elevated JVD ____cm] : elevated JVD ~Vcm [Not Palpable] : not palpable [Normal Rate] : normal [Rhythm Regular] : regular [Normal S1] : normal S1 [Normal S2] : normal S2 [II] : a grade 2 [Holosystolic] : holosystolic [Chestnut] : the murmur was transmitted to the apex [No Pitting Edema] : no pitting edema present [2+] : left 2+ [1+] : left 1+ [0] : left 0 [Normal] : alert and oriented, normal memory [Right Carotid Bruit] : no bruit heard over the right carotid [Left Carotid Bruit] : no bruit heard over the left carotid [de-identified] : left infraclavicular pacemaker pocket

## 2021-06-24 NOTE — HISTORY OF PRESENT ILLNESS
[FreeTextEntry1] : Mr. Lon Skaggs is a 70 year old man with a history of ACC/AHA Stage C HF with borderline EF (LV 5.5 cm, EF 45%) likely from hypertensive cardiomyopathy with severe functional MR. 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 He was roughly 30 lbs above his last discharge weight and the trigger is not taking his medications for 3 months because he felt well and did not refill prescriptions. He was also found to have atrial flutter which was a new diagnosis. Successfully diuresed 40 lbs in hospital and underwent CRT-P upgrade from Micra due to high pacing burden. Had TE guided cardioversion and remained in sinus rhythm. Continues to feel well, now fully active, able to walk quickly up flights of stairs, walk good distances all without dyspnea and denies orthopnea. \par \par At a prior visit found serum potassium elevated and endocrine had instructed to increase spironolactone, but contacted him to remain unchanged and avoid high potassium sources in diet. Since then doing very well, active, no dyspnea, there is no orthopnea or paroxysmal nocturnal dyspnea. Walks regularly, maybe half mile to and from stores.

## 2021-06-24 NOTE — CARDIOLOGY SUMMARY
[LVEF ___%] : LVEF [unfilled]% [Mild] : mild LV dysfunction [Moderate] : moderate pulmonary hypertension [Enlarged] : enlarged LA size [Severe] : severe mitral regurgitation [___] : [unfilled] [de-identified] : 6/24/2021 sinus rhythm with bi-ventricular paced rhythm [de-identified] : 6/24/2021 mild to moderate global LV dysfunction with dilated and hypertrophied LV, left atrial enlargement, normal right heart with device wire, mild to moderate MR. Compared to 11/7/2019 hospital study pulmonary hypertension improved and all other findings unchanged.

## 2021-07-24 ENCOUNTER — LABORATORY RESULT (OUTPATIENT)
Age: 71
End: 2021-07-24

## 2021-07-28 ENCOUNTER — APPOINTMENT (OUTPATIENT)
Dept: INTERNAL MEDICINE | Facility: CLINIC | Age: 71
End: 2021-07-28
Payer: MEDICARE

## 2021-07-28 VITALS
DIASTOLIC BLOOD PRESSURE: 98 MMHG | SYSTOLIC BLOOD PRESSURE: 160 MMHG | BODY MASS INDEX: 25.71 KG/M2 | HEIGHT: 73 IN | WEIGHT: 194 LBS

## 2021-07-28 PROCEDURE — 90732 PPSV23 VACC 2 YRS+ SUBQ/IM: CPT

## 2021-07-28 PROCEDURE — 99072 ADDL SUPL MATRL&STAF TM PHE: CPT

## 2021-07-28 PROCEDURE — 99214 OFFICE O/P EST MOD 30 MIN: CPT | Mod: 25

## 2021-07-28 PROCEDURE — G0009: CPT

## 2021-07-28 NOTE — ASSESSMENT
[FreeTextEntry1] : CHF-stable.\par CAD-stable.  per card\par follow CBC\par Continue same blood pressure medication.  Follow blood pressure.\par Continue same cholesterol medication.  Follow lipid.\par follow A1C\par thyroid nodules-per endo\par follow BUN/creat\par refer to heme for abnl SPEP\par pneumovax

## 2021-07-28 NOTE — HISTORY OF PRESENT ILLNESS
[FreeTextEntry1] : CHF, CAD, anemia, HTN, CRI, high glucose, and hypercholesterol\par  [de-identified] : no complaints\par

## 2021-07-29 ENCOUNTER — APPOINTMENT (OUTPATIENT)
Dept: CARDIOLOGY | Facility: CLINIC | Age: 71
End: 2021-07-29
Payer: MEDICARE

## 2021-07-29 PROCEDURE — 93923 UPR/LXTR ART STDY 3+ LVLS: CPT

## 2021-08-17 ENCOUNTER — NON-APPOINTMENT (OUTPATIENT)
Age: 71
End: 2021-08-17

## 2021-10-05 ENCOUNTER — FORM ENCOUNTER (OUTPATIENT)
Age: 71
End: 2021-10-05

## 2021-10-16 ENCOUNTER — LABORATORY RESULT (OUTPATIENT)
Age: 71
End: 2021-10-16

## 2021-10-18 ENCOUNTER — APPOINTMENT (OUTPATIENT)
Dept: ENDOCRINOLOGY | Facility: CLINIC | Age: 71
End: 2021-10-18
Payer: MEDICARE

## 2021-10-18 VITALS
OXYGEN SATURATION: 98 % | DIASTOLIC BLOOD PRESSURE: 70 MMHG | BODY MASS INDEX: 25.45 KG/M2 | SYSTOLIC BLOOD PRESSURE: 150 MMHG | TEMPERATURE: 97.6 F | WEIGHT: 192 LBS | HEIGHT: 73 IN | HEART RATE: 100 BPM

## 2021-10-18 PROCEDURE — 76536 US EXAM OF HEAD AND NECK: CPT | Mod: 52

## 2021-10-18 PROCEDURE — 99214 OFFICE O/P EST MOD 30 MIN: CPT | Mod: 25

## 2021-10-19 NOTE — ASSESSMENT
[FreeTextEntry1] : 70 y/o male with well controlled T2DM complicated with CKD3 and h/o CVA. Also with multiple thyroid nodules, resistant HTN (managed with 4+ anti-hypertensives including spirinolactone), and HLD. Patient with new R-sided neck mass\par \par T2DM\par - Well controlled, A1c 6.3%\par - I had a lengthy discussion regarding healthy diet (consistent carbohydrates and low calorie content) with the patient. I also emphasized to maintain routine exercise activity (30 minutes daily or 150 minutes in the week).\par - Currently not on any DM medications and not checking FS. Provided him with sample glucometer\par - To check FS 2-3x/week in a staggered manner for goal  and when he is not well\par - Recommend f/u ophthalmologist atleast annually, will provide referral at next visit\par - Urine microalbumin normal in Nov 2019, continue Lisinopril, recommend recheck urine microalbumin\par \par HTN\par - BP high today 150/70, saw Dr. Don in June 2021\par - He may have bilateral adrenal hyperplasia\par - Patient admits to being frustrated about having to take so many medications so only takes them once/day\par - Patient is taking Isosorbide 20 mg QD only, Coreg 6.125mg QD only, Hydralazine 100 mg QD only, Spirinolactone 25 mg QD, Furosemide 80 mg QD, Lisinopril 40 mg QD.\par - Normal plasma metanephrines and catecholamines (note has CKD3), and baseline Costa was normal with suppressed renin while on Spirinolactone. \par - CT Abdomen was done in May 2019 and adrenals were normal (not a dedicated adrenal CT)\par - Start checking BP at home with BP cuff and check daily. Had purchased it in the past\par - Low salt diet\par - STRONGLY encouraged aggressive HTN management given risk of worsening CKD\par \par HLD\par - Recommend continue Atorvastatin 40 mg QHS\par - He has h/o CAD\par \par Multiple Thyroid Nodules\par - TFTs normal in Nov 2019\par - Brief ultrasound done 06/17/20 and 10/18/21, nodules are stable\par - FNA done for Left 2.5 x 1.7 cm thyroid nodule with u/s guidance, resulted category I - non diagnostic\par - Repeat Left lower pole nodule biopsy 2.37 x 2.76 cm in size in July 2020 was benign \par \par Right cervical adenopathy\par - New R-sided neck mass in Level 2 upper cervical chain, reduced in size per patient\par - Firm, hypoechoic, recommend full head/neck ultrasound with radiology and biopsy if indicated given sudden onset and unilateral mass\par \par F/u with me in 6 months\par I will call him with results of neck ultrasound once done\par \par Carter Vigil, \par \par \par  [Long Term Vascular Complications] : long term vascular complications of diabetes [Sick-Day Management] : sick-day management

## 2021-10-19 NOTE — REVIEW OF SYSTEMS
[Negative] : Heme/Lymph [All other systems negative] : All other systems negative [Fatigue] : no fatigue [Recent Weight Gain (___ Lbs)] : no recent weight gain [Recent Weight Loss (___ Lbs)] : no recent weight loss [Dysphagia] : no dysphagia [Neck Pain] : neck pain [Altered Taste] : no altered taste [Dysphonia] : no dysphonia [Lymphadenopathy] : lymphadenopathy [FreeTextEntry4] : States R-neck mass has reduced in size this week vs. last week and is uncomfortable.

## 2021-10-19 NOTE — HISTORY OF PRESENT ILLNESS
[FreeTextEntry1] : 70 y/o male here with family member for f/u on thyroid nodule. Also with T2DM, hypertension, and HLD\par \par Hosp: 09/26-10/02 for CHF and new Afib s/p cardioversion and Eliquis. EF is 45%. Also with h/o Non-Hodgkin Lymphoma s/p chemo and radiation in 2004. Also with HTN and HLD, complete heart block s/p pacemaker in Sept 2019. Also with severe MR.\par \par DM was diagnosed in 2016 (A1c 6.3% in Oct 2021)\par Not taking any medications.\par Complications include: No neuropathy, no retinopathy (LV was in 2020), Has CKD3. Has a h/o old infarct without any residual deficits. No h/o CAD or stents. \par Past DM Medications/Insulin: Glimepiride x 2 years, stopped in May 2019. Was on 2 mg QD\par Current Fingerstick Glucose Logs: Not checking FS, blas snot have a glucometer\par Current Diet Includes: Eats 2-3 meals, cons carb diet\par Snacks: seldom \par Drinks: no\par ROS: No wt loss, polyuria, polydipsia\par \par HTN with CKD3\par Taking Isosorbide 20 mg BID (taking QD), Coreg 6.125mg BID (Taking QD), Hydralazine 100 mg TID (taking QD), Spironolactone 25 mg QD, Furosemide 80 mg QD, Lisinopril 40 mg QD. No known history of adrenal nodules. CT Abdomen in May 2019 without any adrenal abnormalities. Plasma metanephrines and catecholamines were normal. Costa normal. Renin is suppressed. K was not checked. He also has CKD3. \par \par Thyroid Nodule\par Brief ultrasound done in Nov 2019 noted to have nodules. Here for FNA of Left 2.5 x 1.7 cm thyroid nodule.  Also with R-isoechoic 1.0 cm thyroid nodule without suspicious features. No dysphagia or dysphonia. FNA was done for Left 2.5 x 1.7 cm nodule in Feb 2020 that resulted Non-Diagnostic, Category I. \par Repeat LLP nodule measuring 2.37 x 1.86 x 2.76 cm in size, it was category II benign. Brief u/s done 10/19/21, see below\par Now with new Right Level 1 cervical adenopathy since last week. No dysphagia, no dysphonia, no hoarseness\par No weight loss, admits to wt being stable\par \par Additional history:\par FH; T2DM in mother and 2 brothers\par Soc Hx: No alcohol. Tobacco in the past, quit in 2017

## 2021-10-19 NOTE — PHYSICAL EXAM
[Alert] : alert [Well Nourished] : well nourished [No Acute Distress] : no acute distress [Well Developed] : well developed [Normal Sclera/Conjunctiva] : normal sclera/conjunctiva [EOMI] : extra ocular movement intact [No Proptosis] : no proptosis [Normal Outer Ear/Nose] : the ears and nose were normal in appearance [Normal Hearing] : hearing was normal [Thyroid Not Enlarged] : the thyroid was not enlarged [No Respiratory Distress] : no respiratory distress [No Accessory Muscle Use] : no accessory muscle use [Clear to Auscultation] : lungs were clear to auscultation bilaterally [Normal S1, S2] : normal S1 and S2 [Normal Rate] : heart rate was normal [Regular Rhythm] : with a regular rhythm [No Edema] : no peripheral edema [Normal Bowel Sounds] : normal bowel sounds [Not Tender] : non-tender [Not Distended] : not distended [Soft] : abdomen soft [Normal Anterior Cervical Nodes] : no anterior cervical lymphadenopathy [No Spinal Tenderness] : no spinal tenderness [Spine Straight] : spine straight [No Stigmata of Cushings Syndrome] : no stigmata of Cushings Syndrome [Normal Gait] : normal gait [Normal Strength/Tone] : muscle strength and tone were normal [No Rash] : no rash [No Tremors] : no tremors [Oriented x3] : oriented to person, place, and time [Normal Affect] : the affect was normal [Normal Insight/Judgement] : insight and judgment were intact [Normal Mood] : the mood was normal [Healthy Appearance] : healthy appearance [Acanthosis Nigricans] : no acanthosis nigricans [de-identified] : palpable left thyroid nodule that is soft and mobile. Palpable mostly on deglutition. + Right Level 2 cervical adenopathy that is 2-3cm in size

## 2021-10-19 NOTE — DATA REVIEWED
[FreeTextEntry1] : 10/18/21 - Noted to have a very hypoechoic mass with central solidarity in the R-upper cervical chain adjacent to Right SCM muscle.\par \par A brief thyroid ultrasound was done 10/18/21 for palpable left thyroid nodule on exam\par Left thyroid isoechoic solid nodule with regular margin measures 2.61 x 1.66 x 1.98. No microcalcifications. Heterogenous nodule. Also noted to have R-isoechoic solid nodule measuring 1.0 x 0.6 x 1.06 cm in size without suspicious features.\par \par A brief thyroid ultrasound was done 06/17/20 for palpable left thyroid nodule on exam\par Left thyroid isoechoic solid nodule with regular margin measures 2.76 x 1.73 x 2.51. (Low suspicion 5-10%, with recommendations for biopsy >1.5 cm in size). No microcalcifications. Heterogenous nodule.\par Also noted to have R-isoechoic solid nodule measuring 0.99 x 0.54 x 1.07cm in size without suspicious features.\par \par A brief thyroid ultrasound was done 11/11/19 for palpable left thyroid nodule on exam\par Left thyroid isoechoic solid nodule with regular margin measures 2.5 x 1.7 cm in size. (Low suspicion 5-10%, with recommendations for biopsy >1.5 cm in size)\par Also noted to have R-isoechoic solid nodule measuring 1.0 cm in size without suspicious features.

## 2021-10-22 ENCOUNTER — MED ADMIN CHARGE (OUTPATIENT)
Age: 71
End: 2021-10-22

## 2021-10-22 ENCOUNTER — APPOINTMENT (OUTPATIENT)
Dept: INTERNAL MEDICINE | Facility: CLINIC | Age: 71
End: 2021-10-22
Payer: MEDICARE

## 2021-10-22 VITALS
BODY MASS INDEX: 25.33 KG/M2 | DIASTOLIC BLOOD PRESSURE: 74 MMHG | OXYGEN SATURATION: 97 % | HEART RATE: 86 BPM | TEMPERATURE: 97.5 F | WEIGHT: 192 LBS | SYSTOLIC BLOOD PRESSURE: 123 MMHG

## 2021-10-22 DIAGNOSIS — Z00.00 ENCOUNTER FOR GENERAL ADULT MEDICAL EXAMINATION W/OUT ABNORMAL FINDINGS: ICD-10-CM

## 2021-10-22 PROCEDURE — G0008: CPT

## 2021-10-22 PROCEDURE — G0439: CPT

## 2021-10-22 PROCEDURE — 90662 IIV NO PRSV INCREASED AG IM: CPT

## 2021-10-22 PROCEDURE — 99214 OFFICE O/P EST MOD 30 MIN: CPT | Mod: 25

## 2021-10-22 NOTE — HEALTH RISK ASSESSMENT
[Good] : ~his/her~  mood as  good [No] : No [No falls in past year] : Patient reported no falls in the past year [0] : 2) Feeling down, depressed, or hopeless: Not at all (0) [HIV test declined] : HIV test declined [None] : None [Alone] : lives alone [Retired] : retired [] :  [Feels Safe at Home] : Feels safe at home [Fully functional (bathing, dressing, toileting, transferring, walking, feeding)] : Fully functional (bathing, dressing, toileting, transferring, walking, feeding) [Fully functional (using the telephone, shopping, preparing meals, housekeeping, doing laundry, using] : Fully functional and needs no help or supervision to perform IADLs (using the telephone, shopping, preparing meals, housekeeping, doing laundry, using transportation, managing medications and managing finances) [Smoke Detector] : smoke detector [Seat Belt] :  uses seat belt [Designated Healthcare Proxy] : Designated healthcare proxy [Name: ___] : Health Care Proxy's Name: [unfilled]  [Relationship: ___] : Relationship: [unfilled] [] : No [de-identified] : walk [de-identified] : low fat [Patient declined colonoscopy] : Patient declined colonoscopy [Change in mental status noted] : No change in mental status noted [Language] : denies difficulty with language [Behavior] : denies difficulty with behavior [Learning/Retaining New Information] : denies difficulty learning/retaining new information [Handling Complex Tasks] : denies difficulty handling complex tasks [Reasoning] : denies difficulty with reasoning [Spatial Ability and Orientation] : denies difficulty with spatial ability and orientation [de-identified] : wife lives in [place]

## 2021-10-22 NOTE — HISTORY OF PRESENT ILLNESS
[FreeTextEntry1] : CAD, CMP, A. flutter, high glucose, anemia, CKD, HTN, and hyperlipidemia\par  [de-identified] : see ROS\par

## 2021-10-22 NOTE — HEALTH RISK ASSESSMENT
[Good] : ~his/her~  mood as  good [No] : No [No falls in past year] : Patient reported no falls in the past year [0] : 2) Feeling down, depressed, or hopeless: Not at all (0) [HIV test declined] : HIV test declined [None] : None [Alone] : lives alone [Retired] : retired [] :  [Feels Safe at Home] : Feels safe at home [Fully functional (bathing, dressing, toileting, transferring, walking, feeding)] : Fully functional (bathing, dressing, toileting, transferring, walking, feeding) [Fully functional (using the telephone, shopping, preparing meals, housekeeping, doing laundry, using] : Fully functional and needs no help or supervision to perform IADLs (using the telephone, shopping, preparing meals, housekeeping, doing laundry, using transportation, managing medications and managing finances) [Smoke Detector] : smoke detector [Seat Belt] :  uses seat belt [Designated Healthcare Proxy] : Designated healthcare proxy [Name: ___] : Health Care Proxy's Name: [unfilled]  [Relationship: ___] : Relationship: [unfilled] [] : No [de-identified] : walk [de-identified] : low fat [Patient declined colonoscopy] : Patient declined colonoscopy [Change in mental status noted] : No change in mental status noted [Language] : denies difficulty with language [Behavior] : denies difficulty with behavior [Learning/Retaining New Information] : denies difficulty learning/retaining new information [Handling Complex Tasks] : denies difficulty handling complex tasks [Reasoning] : denies difficulty with reasoning [Spatial Ability and Orientation] : denies difficulty with spatial ability and orientation [de-identified] : wife lives in [place]

## 2021-10-22 NOTE — HISTORY OF PRESENT ILLNESS
[FreeTextEntry1] : CAD, CMP, A. flutter, high glucose, anemia, CKD, HTN, and hyperlipidemia\par  [de-identified] : see ROS\par

## 2021-10-22 NOTE — ASSESSMENT
[FreeTextEntry1] : CAD, CMP, and A. flutter-stable.  per card\par follow A1C\par I personally repeated blood pressure and blood pressure=114/66.  Continue same blood pressure medications.\par follow BUN/creat\par Continue same cholesterol medication.  Follow lipid.\par right neck mass-mass is shrinking, per patient.  Patient is schedule for neck ultrasound tomorrow ([facility])\par follow CBC\par Patient refuses screening colonoscopy.\par thyroid nodules-per endo

## 2021-10-22 NOTE — PHYSICAL EXAM
[No Acute Distress] : no acute distress [Well Developed] : well developed [Well-Appearing] : well-appearing [No Respiratory Distress] : no respiratory distress  [No Accessory Muscle Use] : no accessory muscle use [Clear to Auscultation] : lungs were clear to auscultation bilaterally [Normal Rate] : normal rate  [Regular Rhythm] : with a regular rhythm [No Edema] : there was no peripheral edema [Soft] : abdomen soft [Non Tender] : non-tender [Testes Mass (___cm)] : there were no testicular masses [Normal Gait] : normal gait [Speech Grossly Normal] : speech grossly normal [Normal Affect] : the affect was normal [Normal Mood] : the mood was normal [Right Foot Was Examined] : Right foot ~C was examined [Left Foot Was Examined] : left foot ~C was examined [None] : no ulcers in either foot were found

## 2021-10-23 ENCOUNTER — OUTPATIENT (OUTPATIENT)
Dept: OUTPATIENT SERVICES | Facility: HOSPITAL | Age: 71
LOS: 1 days | End: 2021-10-23
Payer: MEDICARE

## 2021-10-23 ENCOUNTER — APPOINTMENT (OUTPATIENT)
Dept: ULTRASOUND IMAGING | Facility: CLINIC | Age: 71
End: 2021-10-23
Payer: MEDICARE

## 2021-10-23 DIAGNOSIS — Z95.0 PRESENCE OF CARDIAC PACEMAKER: Chronic | ICD-10-CM

## 2021-10-23 DIAGNOSIS — C85.90 NON-HODGKIN LYMPHOMA, UNSPECIFIED, UNSPECIFIED SITE: Chronic | ICD-10-CM

## 2021-10-23 DIAGNOSIS — R22.1 LOCALIZED SWELLING, MASS AND LUMP, NECK: ICD-10-CM

## 2021-10-23 DIAGNOSIS — Z00.8 ENCOUNTER FOR OTHER GENERAL EXAMINATION: ICD-10-CM

## 2021-10-23 PROCEDURE — 76536 US EXAM OF HEAD AND NECK: CPT

## 2021-10-23 PROCEDURE — 76536 US EXAM OF HEAD AND NECK: CPT | Mod: 26

## 2021-10-28 LAB
CREAT SPEC-SCNC: 251 MG/DL
MICROALBUMIN 24H UR DL<=1MG/L-MCNC: 45.4 MG/DL
MICROALBUMIN/CREAT 24H UR-RTO: 181 MG/G

## 2021-12-22 ENCOUNTER — NON-APPOINTMENT (OUTPATIENT)
Age: 71
End: 2021-12-22

## 2021-12-22 ENCOUNTER — APPOINTMENT (OUTPATIENT)
Dept: ELECTROPHYSIOLOGY | Facility: CLINIC | Age: 71
End: 2021-12-22
Payer: MEDICARE

## 2021-12-22 PROCEDURE — 93294 REM INTERROG EVL PM/LDLS PM: CPT | Mod: NC

## 2021-12-22 PROCEDURE — 93296 REM INTERROG EVL PM/IDS: CPT | Mod: NC

## 2021-12-23 ENCOUNTER — APPOINTMENT (OUTPATIENT)
Dept: CARDIOLOGY | Facility: CLINIC | Age: 71
End: 2021-12-23
Payer: COMMERCIAL

## 2021-12-23 ENCOUNTER — NON-APPOINTMENT (OUTPATIENT)
Age: 71
End: 2021-12-23

## 2021-12-23 VITALS — HEART RATE: 100 BPM | DIASTOLIC BLOOD PRESSURE: 70 MMHG | SYSTOLIC BLOOD PRESSURE: 130 MMHG

## 2021-12-23 VITALS
WEIGHT: 183 LBS | SYSTOLIC BLOOD PRESSURE: 150 MMHG | BODY MASS INDEX: 24.14 KG/M2 | DIASTOLIC BLOOD PRESSURE: 90 MMHG | OXYGEN SATURATION: 98 % | HEART RATE: 110 BPM

## 2021-12-23 PROCEDURE — 99214 OFFICE O/P EST MOD 30 MIN: CPT

## 2021-12-23 PROCEDURE — 93000 ELECTROCARDIOGRAM COMPLETE: CPT

## 2021-12-23 NOTE — REASON FOR VISIT
[Cardiac Failure] : cardiac failure [Arrhythmia/ECG Abnorrmalities] : arrhythmia/ECG abnormalities [Hypertension] : hypertension [Coronary Artery Disease] : coronary artery disease [Carotid, Aortic and Peripheral Vascular Disease] : carotid, aortic and peripheral vascular disease [Family Member] : family member [Heart Failure] : congestive heart failure

## 2021-12-25 NOTE — CARDIOLOGY SUMMARY
[LVEF ___%] : LVEF [unfilled]% [Mild] : mild LV dysfunction [Moderate] : moderate pulmonary hypertension [Enlarged] : enlarged LA size [Severe] : severe mitral regurgitation [___] : [unfilled] [de-identified] : 6/24/2021 sinus rhythm with bi-ventricular paced rhythm\par 12/23/2021 mild sinus tachycardia, P-sense, biventricular paced. [de-identified] : 6/24/2021 mild to moderate global LV dysfunction with dilated and hypertrophied LV, left atrial enlargement, normal right heart with device wire, mild to moderate MR. Compared to 11/7/2019 hospital study pulmonary hypertension improved and all other findings unchanged.

## 2021-12-25 NOTE — HISTORY OF PRESENT ILLNESS
[FreeTextEntry1] : Mr. 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 He was roughly 30 lbs above his last discharge weight and the trigger is not taking his medications for 3 months because he felt well and did not refill prescriptions. He was also found to have atrial flutter which was a new diagnosis. Successfully diuresed 40 lbs in hospital and underwent CRT-P upgrade from Micra due to high pacing burden. Had TE guided cardioversion and remained in sinus rhythm. Continues to feel well, now fully active, able to walk quickly up flights of stairs, walk good distances all without dyspnea and denies orthopnea. \par \par At a prior visit found serum potassium elevated and endocrine had instructed to increase spironolactone, but contacted him to remain unchanged and avoid high potassium sources in diet. Since then doing very well, active, no dyspnea, there is no orthopnea or paroxysmal nocturnal dyspnea. Walks regularly, maybe half mile to and from stores.\par \par Since last seen doing extremely well, no symptoms, walking outdoors for exercise. However, apixaban became too expensive (Medicare D "donut hole") and has not been taking.

## 2021-12-25 NOTE — DISCUSSION/SUMMARY
[Anticoagulation] : anticoagulation [Medication Changes Per Orders] : Medication changes are as documented in orders 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 [Cardiomyopathy] : cardiomyopathy [Improving] : improving [NYHA Class II] : NYHA Class II [Hypertensive Cardiomyopathy] : hypertensive cardiomyopathy [Echocardiogram] : echocardiogram [Resynchronization Therapy] : resynchronization therapy [Coronary Artery Disease] : coronary artery disease [Systolic Heart Failure] : systolic heart failure [Compensated] : compensated [Responding to Treatment] : responding to treatment [None] : There are no changes in medication management [Patient] : the patient [de-identified] : re-order apixaban and provide samples to resume immediately [de-identified] : Pacemaker interrogation reviewed and good function, good pacing, nearly 100% bi-ventricular, no pauses [de-identified] : Has CRT-D pacemaker [FreeTextEntry1] : discussed need to follow up with pacemaker monitoring. He has disconnected monitoring device and missed recent EP appointment. He and daughter will call to reschedule [FreeTextEntry2] : and daughter

## 2021-12-25 NOTE — PHYSICAL EXAM
[Elevated JVD ____cm] : elevated JVD ~Vcm [Not Palpable] : not palpable [Normal Rate] : normal [Rhythm Regular] : regular [Normal S1] : normal S1 [Normal S2] : normal S2 [II] : a grade 2 [Holosystolic] : holosystolic [[place]] : the murmur was transmitted to the apex [No Pitting Edema] : no pitting edema present [2+] : left 2+ [1+] : left 1+ [0] : left 0 [Normal] : alert and oriented, normal memory [Left Carotid Bruit] : no bruit heard over the left carotid [Right Carotid Bruit] : no bruit heard over the right carotid [de-identified] : left infraclavicular pacemaker pocket

## 2022-01-10 NOTE — CONSULT NOTE ADULT - REASON FOR ADMISSION
Pt to ED for intermittent palpitations and \"surge of adrenaline\" awaking pt from sleep. Took atarax this morning with no relief. Hx of anxiety. Sx began one month ago worsening last several days.
SOB, LE edema

## 2022-01-26 ENCOUNTER — APPOINTMENT (OUTPATIENT)
Dept: INTERNAL MEDICINE | Facility: CLINIC | Age: 72
End: 2022-01-26
Payer: MEDICARE

## 2022-01-26 VITALS
WEIGHT: 185 LBS | DIASTOLIC BLOOD PRESSURE: 76 MMHG | BODY MASS INDEX: 24.52 KG/M2 | SYSTOLIC BLOOD PRESSURE: 140 MMHG | HEIGHT: 73 IN

## 2022-01-26 PROCEDURE — 99214 OFFICE O/P EST MOD 30 MIN: CPT | Mod: 25

## 2022-01-26 PROCEDURE — 36415 COLL VENOUS BLD VENIPUNCTURE: CPT

## 2022-01-26 NOTE — ASSESSMENT
[FreeTextEntry1] : CAD, cardiomyopathy, and atrial flutter-stable.  Continue same medication.  Followed by cardiology\par Continue same blood pressure medication.  Follow blood pressure.\par Continue same cholesterol medication.  Fasting lipid.\par Hemoglobin A1c\par CBC.  Iron studies\par BUN/creatinine\par Thyroid nodule-followed by endocrinologist\par Blood was drawn at office today.\par

## 2022-01-26 NOTE — PHYSICAL EXAM
[No Acute Distress] : no acute distress [Well Developed] : well developed [No Respiratory Distress] : no respiratory distress  [No Accessory Muscle Use] : no accessory muscle use [Clear to Auscultation] : lungs were clear to auscultation bilaterally [Normal Rate] : normal rate  [Regular Rhythm] : with a regular rhythm [Soft] : abdomen soft [No CVA Tenderness] : no CVA  tenderness [No Spinal Tenderness] : no spinal tenderness [Speech Grossly Normal] : speech grossly normal [Normal Affect] : the affect was normal [Normal Mood] : the mood was normal

## 2022-01-26 NOTE — HISTORY OF PRESENT ILLNESS
[FreeTextEntry1] : CAD, cardiomyopathy, atrial flutter, anemia, CKD, hypertension, hyperlipidemia, and high glucose [de-identified] : no complaints\par

## 2022-01-31 LAB
ALBUMIN SERPL ELPH-MCNC: 4.4 G/DL
ALP BLD-CCNC: 117 U/L
ALT SERPL-CCNC: 8 U/L
ANION GAP SERPL CALC-SCNC: 14 MMOL/L
AST SERPL-CCNC: 20 U/L
BASOPHILS # BLD AUTO: 0.08 K/UL
BASOPHILS NFR BLD AUTO: 0.7 %
BILIRUB SERPL-MCNC: 0.5 MG/DL
BUN SERPL-MCNC: 20 MG/DL
CALCIUM SERPL-MCNC: 9.8 MG/DL
CHLORIDE SERPL-SCNC: 102 MMOL/L
CHOLEST SERPL-MCNC: 126 MG/DL
CO2 SERPL-SCNC: 25 MMOL/L
CREAT SERPL-MCNC: 1.57 MG/DL
EOSINOPHIL # BLD AUTO: 0.38 K/UL
EOSINOPHIL NFR BLD AUTO: 3.3 %
ESTIMATED AVERAGE GLUCOSE: 108 MG/DL
FERRITIN SERPL-MCNC: 255 NG/ML
FOLATE SERPL-MCNC: 9 NG/ML
GLUCOSE SERPL-MCNC: 94 MG/DL
HBA1C MFR BLD HPLC: 5.4 %
HCT VFR BLD CALC: 38.6 %
HDLC SERPL-MCNC: 26 MG/DL
HGB BLD-MCNC: 11.4 G/DL
IMM GRANULOCYTES NFR BLD AUTO: 0.3 %
IRON SATN MFR SERPL: 10 %
IRON SERPL-MCNC: 28 UG/DL
LDLC SERPL CALC-MCNC: 80 MG/DL
LYMPHOCYTES # BLD AUTO: 0.91 K/UL
LYMPHOCYTES NFR BLD AUTO: 7.8 %
MAN DIFF?: NORMAL
MCHC RBC-ENTMCNC: 27.6 PG
MCHC RBC-ENTMCNC: 29.5 GM/DL
MCV RBC AUTO: 93.5 FL
MONOCYTES # BLD AUTO: 0.74 K/UL
MONOCYTES NFR BLD AUTO: 6.4 %
NEUTROPHILS # BLD AUTO: 9.5 K/UL
NEUTROPHILS NFR BLD AUTO: 81.5 %
NONHDLC SERPL-MCNC: 100 MG/DL
PLATELET # BLD AUTO: 209 K/UL
POTASSIUM SERPL-SCNC: 4.9 MMOL/L
PROT SERPL-MCNC: 8.8 G/DL
RBC # BLD: 4.13 M/UL
RBC # FLD: 14.6 %
SODIUM SERPL-SCNC: 141 MMOL/L
TIBC SERPL-MCNC: 273 UG/DL
TRIGL SERPL-MCNC: 100 MG/DL
UIBC SERPL-MCNC: 245 UG/DL
VIT B12 SERPL-MCNC: 490 PG/ML
WBC # FLD AUTO: 11.65 K/UL

## 2022-02-01 ENCOUNTER — APPOINTMENT (OUTPATIENT)
Dept: INTERNAL MEDICINE | Facility: CLINIC | Age: 72
End: 2022-02-01
Payer: MEDICARE

## 2022-02-01 VITALS
SYSTOLIC BLOOD PRESSURE: 150 MMHG | WEIGHT: 185 LBS | BODY MASS INDEX: 24.52 KG/M2 | HEIGHT: 73 IN | DIASTOLIC BLOOD PRESSURE: 80 MMHG

## 2022-02-01 LAB
BILIRUB UR QL STRIP: NORMAL
GLUCOSE UR-MCNC: NEGATIVE
HCG UR QL: 1 EU/DL
HGB UR QL STRIP.AUTO: NEGATIVE
KETONES UR-MCNC: NEGATIVE
LEUKOCYTE ESTERASE UR QL STRIP: NEGATIVE
NITRITE UR QL STRIP: NEGATIVE
PH UR STRIP: 5
PROT UR STRIP-MCNC: 30
SP GR UR STRIP: 1.03

## 2022-02-01 PROCEDURE — 36415 COLL VENOUS BLD VENIPUNCTURE: CPT

## 2022-02-01 PROCEDURE — 81002 URINALYSIS NONAUTO W/O SCOPE: CPT

## 2022-02-01 PROCEDURE — 99214 OFFICE O/P EST MOD 30 MIN: CPT | Mod: 25

## 2022-02-01 NOTE — HISTORY OF PRESENT ILLNESS
[FreeTextEntry1] : Elevated WBC [de-identified] : 2 weeks of mild intermittent sharp right low back pain.  No fever.  No dysuria.  No hematuria.  No lower extremity weakness or numbness

## 2022-02-01 NOTE — PHYSICAL EXAM
[No Acute Distress] : no acute distress [No Respiratory Distress] : no respiratory distress  [No Accessory Muscle Use] : no accessory muscle use [Clear to Auscultation] : lungs were clear to auscultation bilaterally [Normal Rate] : normal rate  [Regular Rhythm] : with a regular rhythm [Soft] : abdomen soft [Non Tender] : non-tender [Normal Posterior Cervical Nodes] : no posterior cervical lymphadenopathy [Normal Anterior Cervical Nodes] : no anterior cervical lymphadenopathy [No CVA Tenderness] : no CVA  tenderness [No Spinal Tenderness] : no spinal tenderness [Speech Grossly Normal] : speech grossly normal [Normal Affect] : the affect was normal [Normal Mood] : the mood was normal

## 2022-02-01 NOTE — ASSESSMENT
[FreeTextEntry1] : UA-normal\par Right low back pain-likely muscular pain\par Leukocytosis-repeat CBC.  No evidence of infection at this time.\par Cardiomyopathy-stable.\par Hypertension-blood pressure is high today.  Continue same blood pressure medications.  Follow blood pressure\par Blood was drawn at office today.\par Iron deficiency anemia-OTC iron 1 tablet daily.

## 2022-02-02 LAB
ALBUMIN SERPL ELPH-MCNC: 4.3 G/DL
ALP BLD-CCNC: 129 U/L
ALT SERPL-CCNC: 5 U/L
ANION GAP SERPL CALC-SCNC: 13 MMOL/L
AST SERPL-CCNC: 16 U/L
BASOPHILS # BLD AUTO: 0.09 K/UL
BASOPHILS NFR BLD AUTO: 0.8 %
BILIRUB SERPL-MCNC: 0.5 MG/DL
BUN SERPL-MCNC: 23 MG/DL
CALCIUM SERPL-MCNC: 9.8 MG/DL
CHLORIDE SERPL-SCNC: 101 MMOL/L
CO2 SERPL-SCNC: 26 MMOL/L
CREAT SERPL-MCNC: 1.47 MG/DL
EOSINOPHIL # BLD AUTO: 0.4 K/UL
EOSINOPHIL NFR BLD AUTO: 3.6 %
GLUCOSE SERPL-MCNC: 98 MG/DL
HCT VFR BLD CALC: 39.8 %
HGB BLD-MCNC: 11.8 G/DL
IMM GRANULOCYTES NFR BLD AUTO: 0.4 %
LYMPHOCYTES # BLD AUTO: 0.8 K/UL
LYMPHOCYTES NFR BLD AUTO: 7.2 %
MAN DIFF?: NORMAL
MCHC RBC-ENTMCNC: 27.6 PG
MCHC RBC-ENTMCNC: 29.6 GM/DL
MCV RBC AUTO: 93.2 FL
MONOCYTES # BLD AUTO: 0.89 K/UL
MONOCYTES NFR BLD AUTO: 8 %
NEUTROPHILS # BLD AUTO: 8.94 K/UL
NEUTROPHILS NFR BLD AUTO: 80 %
PLATELET # BLD AUTO: 204 K/UL
POTASSIUM SERPL-SCNC: 4.5 MMOL/L
PROT SERPL-MCNC: 8.6 G/DL
RBC # BLD: 4.27 M/UL
RBC # FLD: 14.6 %
SODIUM SERPL-SCNC: 140 MMOL/L
WBC # FLD AUTO: 11.17 K/UL

## 2022-02-03 NOTE — ED ADULT NURSE NOTE - TEMPLATE LIST FOR HEAD TO TOE ASSESSMENT
Quality 226: Preventive Care And Screening: Tobacco Use: Screening And Cessation Intervention: Patient screened for tobacco use and is an ex/non-smoker Quality 130: Documentation Of Current Medications In The Medical Record: Current Medications Documented Quality 431: Preventive Care And Screening: Unhealthy Alcohol Use - Screening: Patient not identified as an unhealthy alcohol user when screened for unhealthy alcohol use using a systematic screening method Detail Level: Detailed Quality 110: Preventive Care And Screening: Influenza Immunization: Influenza immunization was not ordered or administered, reason not given Cardiac

## 2022-03-25 ENCOUNTER — NON-APPOINTMENT (OUTPATIENT)
Age: 72
End: 2022-03-25

## 2022-03-25 ENCOUNTER — APPOINTMENT (OUTPATIENT)
Dept: ELECTROPHYSIOLOGY | Facility: CLINIC | Age: 72
End: 2022-03-25
Payer: MEDICARE

## 2022-03-25 PROCEDURE — 93294 REM INTERROG EVL PM/LDLS PM: CPT

## 2022-03-25 PROCEDURE — 93296 REM INTERROG EVL PM/IDS: CPT

## 2022-04-29 NOTE — H&P CARDIOLOGY - PMH
DM type 2 (diabetes mellitus, type 2)  Never on insulin  Essential hypertension    Moderate mitral regurgitation    Non-Hodgkins Lymphoma  In [facility] for > 10 years  Pleural effusion    Rhinitis, allergic    Systolic heart failure Yes Atrial flutter, unspecified type    DM type 2 (diabetes mellitus, type 2)  Never on insulin  Essential hypertension    Impaired memory    Moderate mitral regurgitation    Non-Hodgkins Lymphoma  [facility]  Pleural effusion    Rhinitis, allergic    Systolic heart failure

## 2022-05-02 ENCOUNTER — APPOINTMENT (OUTPATIENT)
Dept: RADIOLOGY | Facility: CLINIC | Age: 72
End: 2022-05-02
Payer: MEDICARE

## 2022-05-02 ENCOUNTER — OUTPATIENT (OUTPATIENT)
Dept: OUTPATIENT SERVICES | Facility: HOSPITAL | Age: 72
LOS: 1 days | End: 2022-05-02
Payer: COMMERCIAL

## 2022-05-02 ENCOUNTER — APPOINTMENT (OUTPATIENT)
Dept: INTERNAL MEDICINE | Facility: CLINIC | Age: 72
End: 2022-05-02
Payer: MEDICARE

## 2022-05-02 ENCOUNTER — RESULT REVIEW (OUTPATIENT)
Age: 72
End: 2022-05-02

## 2022-05-02 ENCOUNTER — APPOINTMENT (OUTPATIENT)
Dept: ULTRASOUND IMAGING | Facility: CLINIC | Age: 72
End: 2022-05-02
Payer: MEDICARE

## 2022-05-02 VITALS
WEIGHT: 176 LBS | HEIGHT: 73 IN | BODY MASS INDEX: 23.33 KG/M2 | SYSTOLIC BLOOD PRESSURE: 130 MMHG | HEART RATE: 71 BPM | DIASTOLIC BLOOD PRESSURE: 66 MMHG | OXYGEN SATURATION: 94 %

## 2022-05-02 DIAGNOSIS — C85.90 NON-HODGKIN LYMPHOMA, UNSPECIFIED, UNSPECIFIED SITE: Chronic | ICD-10-CM

## 2022-05-02 DIAGNOSIS — Z95.0 PRESENCE OF CARDIAC PACEMAKER: Chronic | ICD-10-CM

## 2022-05-02 DIAGNOSIS — Z00.8 ENCOUNTER FOR OTHER GENERAL EXAMINATION: ICD-10-CM

## 2022-05-02 PROCEDURE — 71046 X-RAY EXAM CHEST 2 VIEWS: CPT

## 2022-05-02 PROCEDURE — 76536 US EXAM OF HEAD AND NECK: CPT | Mod: 26

## 2022-05-02 PROCEDURE — 71046 X-RAY EXAM CHEST 2 VIEWS: CPT | Mod: 26

## 2022-05-02 PROCEDURE — 76536 US EXAM OF HEAD AND NECK: CPT

## 2022-05-02 PROCEDURE — 99214 OFFICE O/P EST MOD 30 MIN: CPT | Mod: 25

## 2022-05-02 NOTE — ASSESSMENT
[FreeTextEntry1] : 10 pound weight loss over the past 2 months, fatigue, and new left neck mass-possibly lymphoma.  Ultrasound of neck.  Refer to surgeon (Salvador Tavarez).  Refer to [facility]\par Repeat CBC\par All medications were reviewed with patient.\par Continue same blood pressure medication.  Follow blood pressure.\par Continue same cholesterol medication.  Follow lipid.\par CAD and cardiomyopathy-stable\par cough-CXR ([facility])\par Blood was drawn at office today.\par

## 2022-05-02 NOTE — PHYSICAL EXAM
[No Acute Distress] : no acute distress [No Respiratory Distress] : no respiratory distress  [No Accessory Muscle Use] : no accessory muscle use [Clear to Auscultation] : lungs were clear to auscultation bilaterally [Normal Rate] : normal rate  [Soft] : abdomen soft [No CVA Tenderness] : no CVA  tenderness [No Spinal Tenderness] : no spinal tenderness [Speech Grossly Normal] : speech grossly normal [Normal Affect] : the affect was normal [Normal Mood] : the mood was normal

## 2022-05-02 NOTE — HISTORY OF PRESENT ILLNESS
[FreeTextEntry8] : Severe fatigue over past 1 month.\par Patient lost 10 pounds over past 2 months\par Approximately 2 weeks ago, patient noticed lump on left neck.\par Patient denies chest pain or abdominal pain.\par Occasional dry cough\par No fever

## 2022-05-03 ENCOUNTER — APPOINTMENT (OUTPATIENT)
Dept: INTERNAL MEDICINE | Facility: CLINIC | Age: 72
End: 2022-05-03
Payer: MEDICARE

## 2022-05-03 VITALS
HEART RATE: 99 BPM | OXYGEN SATURATION: 93 % | SYSTOLIC BLOOD PRESSURE: 134 MMHG | WEIGHT: 176 LBS | DIASTOLIC BLOOD PRESSURE: 70 MMHG | TEMPERATURE: 98.1 F | HEIGHT: 73 IN | BODY MASS INDEX: 23.33 KG/M2

## 2022-05-03 LAB
ALBUMIN SERPL ELPH-MCNC: 3.7 G/DL
ALP BLD-CCNC: 101 U/L
ALT SERPL-CCNC: 7 U/L
ANION GAP SERPL CALC-SCNC: 18 MMOL/L
AST SERPL-CCNC: 22 U/L
BASOPHILS # BLD AUTO: 0.1 K/UL
BASOPHILS NFR BLD AUTO: 0.7 %
BILIRUB SERPL-MCNC: 0.5 MG/DL
BUN SERPL-MCNC: 16 MG/DL
CALCIUM SERPL-MCNC: 9.8 MG/DL
CHLORIDE SERPL-SCNC: 102 MMOL/L
CO2 SERPL-SCNC: 23 MMOL/L
CREAT SERPL-MCNC: 1.5 MG/DL
EGFR: 50 ML/MIN/1.73M2
EOSINOPHIL # BLD AUTO: 0.27 K/UL
EOSINOPHIL NFR BLD AUTO: 2 %
GLUCOSE SERPL-MCNC: 119 MG/DL
HCT VFR BLD CALC: 34.7 %
HGB BLD-MCNC: 9.7 G/DL
IMM GRANULOCYTES NFR BLD AUTO: 0.3 %
LYMPHOCYTES # BLD AUTO: 0.74 K/UL
LYMPHOCYTES NFR BLD AUTO: 5.4 %
MAN DIFF?: NORMAL
MCHC RBC-ENTMCNC: 25.5 PG
MCHC RBC-ENTMCNC: 28 GM/DL
MCV RBC AUTO: 91.3 FL
MONOCYTES # BLD AUTO: 0.58 K/UL
MONOCYTES NFR BLD AUTO: 4.2 %
NEUTROPHILS # BLD AUTO: 11.95 K/UL
NEUTROPHILS NFR BLD AUTO: 87.4 %
PLATELET # BLD AUTO: 367 K/UL
POTASSIUM SERPL-SCNC: 4.2 MMOL/L
PROT SERPL-MCNC: 8.2 G/DL
RBC # BLD: 3.8 M/UL
RBC # FLD: 16.3 %
SODIUM SERPL-SCNC: 143 MMOL/L
TSH SERPL-ACNC: 1.58 UIU/ML
WBC # FLD AUTO: 13.68 K/UL

## 2022-05-03 PROCEDURE — 99214 OFFICE O/P EST MOD 30 MIN: CPT

## 2022-05-03 NOTE — HISTORY OF PRESENT ILLNESS
[FreeTextEntry1] : Pneumonia [de-identified] : Patient has dry cough.  No shortness of breath.  No fever.

## 2022-05-03 NOTE — PHYSICAL EXAM
[No Acute Distress] : no acute distress [No Respiratory Distress] : no respiratory distress  [No Accessory Muscle Use] : no accessory muscle use [Soft] : abdomen soft [Speech Grossly Normal] : speech grossly normal [Normal Affect] : the affect was normal [Normal Mood] : the mood was normal

## 2022-05-03 NOTE — ASSESSMENT
[FreeTextEntry1] : Chest x-ray-right middle lobe opacity.  Patient given prescription for Levaquin 500 mg daily x 14 days or if insurance does not cover Levaquin, patient will start Augmentin 875 twice daily x 14 days.\par Leukocytosis-follow WBC\par Anemia-follow CBC\par Continue same blood pressure medication.  Follow blood pressure.\par Continue same cholesterol medication.  Follow lipid.\par  left neck lymphadenopathy-neck ultrasound results pending\par Home COVID-19 test done today was negative

## 2022-05-09 ENCOUNTER — APPOINTMENT (OUTPATIENT)
Dept: INTERNAL MEDICINE | Facility: CLINIC | Age: 72
End: 2022-05-09
Payer: MEDICARE

## 2022-05-09 VITALS
SYSTOLIC BLOOD PRESSURE: 110 MMHG | TEMPERATURE: 98.8 F | BODY MASS INDEX: 23.33 KG/M2 | HEIGHT: 73 IN | OXYGEN SATURATION: 94 % | WEIGHT: 176 LBS | HEART RATE: 67 BPM | DIASTOLIC BLOOD PRESSURE: 60 MMHG

## 2022-05-09 PROCEDURE — 99214 OFFICE O/P EST MOD 30 MIN: CPT | Mod: 25

## 2022-05-09 PROCEDURE — 36415 COLL VENOUS BLD VENIPUNCTURE: CPT

## 2022-05-09 NOTE — ASSESSMENT
[FreeTextEntry1] : Posterior left proximal LE nodule-nontender lesion.  Doppler-rule out DVT ([facility])\par Right middle lobe pneumonia-continue Levaquin daily x 14 days.  Repeat CBC\par Persistent leukocytosis-patient is scheduled to see hematologist next week\par Neck lymphadenopathy-follow.  Consider referral to surgical oncologist for biopsy if lymphadenopathy persists.\par Follow weight\par Hypertension-continue lisinopril 40 mg daily.  Follow blood pressure\par Hyperlipidemia-continue Lipitor 40 mg daily.  Lipid\par Hemoglobin A1c\par Blood was drawn at office today.\par

## 2022-05-09 NOTE — HISTORY OF PRESENT ILLNESS
[FreeTextEntry1] : Pneumonia, weight loss, neck lymphadenopathy, and leukocytosis [de-identified] : Patient reports of nodule in left upper leg, first noticed several days ago.  Daughter was not aware of this leg lesion.

## 2022-05-09 NOTE — PHYSICAL EXAM
[No Acute Distress] : no acute distress [Well-Appearing] : well-appearing [No Respiratory Distress] : no respiratory distress  [No Accessory Muscle Use] : no accessory muscle use [Clear to Auscultation] : lungs were clear to auscultation bilaterally [Normal Rate] : normal rate  [Soft] : abdomen soft [No CVA Tenderness] : no CVA  tenderness [Speech Grossly Normal] : speech grossly normal [Normal Affect] : the affect was normal [Normal Mood] : the mood was normal

## 2022-05-11 LAB
ALBUMIN SERPL ELPH-MCNC: 3.7 G/DL
ALP BLD-CCNC: 93 U/L
ALT SERPL-CCNC: 6 U/L
ANION GAP SERPL CALC-SCNC: 14 MMOL/L
AST SERPL-CCNC: 15 U/L
BASOPHILS # BLD AUTO: 0.08 K/UL
BASOPHILS NFR BLD AUTO: 0.6 %
BILIRUB SERPL-MCNC: 0.7 MG/DL
BUN SERPL-MCNC: 14 MG/DL
CALCIUM SERPL-MCNC: 9.6 MG/DL
CHLORIDE SERPL-SCNC: 102 MMOL/L
CHOLEST SERPL-MCNC: 128 MG/DL
CO2 SERPL-SCNC: 26 MMOL/L
CREAT SERPL-MCNC: 1.36 MG/DL
EGFR: 56 ML/MIN/1.73M2
EOSINOPHIL # BLD AUTO: 0.28 K/UL
EOSINOPHIL NFR BLD AUTO: 2.2 %
ESTIMATED AVERAGE GLUCOSE: 103 MG/DL
GLUCOSE SERPL-MCNC: 115 MG/DL
HBA1C MFR BLD HPLC: 5.2 %
HCT VFR BLD CALC: 36.1 %
HDLC SERPL-MCNC: 27 MG/DL
HGB BLD-MCNC: 10.3 G/DL
IMM GRANULOCYTES NFR BLD AUTO: 0.5 %
IRON SATN MFR SERPL: 11 %
IRON SERPL-MCNC: 25 UG/DL
LDLC SERPL CALC-MCNC: 84 MG/DL
LYMPHOCYTES # BLD AUTO: 0.58 K/UL
LYMPHOCYTES NFR BLD AUTO: 4.6 %
MAN DIFF?: NORMAL
MCHC RBC-ENTMCNC: 25.1 PG
MCHC RBC-ENTMCNC: 28.5 GM/DL
MCV RBC AUTO: 87.8 FL
MONOCYTES # BLD AUTO: 0.87 K/UL
MONOCYTES NFR BLD AUTO: 6.8 %
NEUTROPHILS # BLD AUTO: 10.87 K/UL
NEUTROPHILS NFR BLD AUTO: 85.3 %
NONHDLC SERPL-MCNC: 102 MG/DL
PLATELET # BLD AUTO: 318 K/UL
POTASSIUM SERPL-SCNC: 4.2 MMOL/L
PROT SERPL-MCNC: 8 G/DL
RBC # BLD: 4.11 M/UL
RBC # FLD: 16.5 %
SODIUM SERPL-SCNC: 141 MMOL/L
TIBC SERPL-MCNC: 232 UG/DL
TRIGL SERPL-MCNC: 91 MG/DL
UIBC SERPL-MCNC: 206 UG/DL
WBC # FLD AUTO: 12.74 K/UL

## 2022-05-14 ENCOUNTER — RESULT REVIEW (OUTPATIENT)
Age: 72
End: 2022-05-14

## 2022-05-14 ENCOUNTER — OUTPATIENT (OUTPATIENT)
Dept: OUTPATIENT SERVICES | Facility: HOSPITAL | Age: 72
LOS: 1 days | Discharge: HOME | End: 2022-05-14

## 2022-05-14 ENCOUNTER — APPOINTMENT (OUTPATIENT)
Dept: ULTRASOUND IMAGING | Facility: HOSPITAL | Age: 72
End: 2022-05-14
Payer: COMMERCIAL

## 2022-05-14 ENCOUNTER — OUTPATIENT (OUTPATIENT)
Dept: OUTPATIENT SERVICES | Facility: HOSPITAL | Age: 72
LOS: 1 days | End: 2022-05-14
Payer: COMMERCIAL

## 2022-05-14 DIAGNOSIS — Z95.0 PRESENCE OF CARDIAC PACEMAKER: Chronic | ICD-10-CM

## 2022-05-14 DIAGNOSIS — C85.90 NON-HODGKIN LYMPHOMA, UNSPECIFIED, UNSPECIFIED SITE: Chronic | ICD-10-CM

## 2022-05-14 DIAGNOSIS — C85.90 NON-HODGKIN LYMPHOMA, UNSPECIFIED, UNSPECIFIED SITE: ICD-10-CM

## 2022-05-14 DIAGNOSIS — Z00.8 ENCOUNTER FOR OTHER GENERAL EXAMINATION: ICD-10-CM

## 2022-05-14 PROCEDURE — 93971 EXTREMITY STUDY: CPT

## 2022-05-14 PROCEDURE — 93971 EXTREMITY STUDY: CPT | Mod: 26,LT

## 2022-05-17 ENCOUNTER — RESULT CHARGE (OUTPATIENT)
Age: 72
End: 2022-05-17

## 2022-05-17 ENCOUNTER — NON-APPOINTMENT (OUTPATIENT)
Age: 72
End: 2022-05-17

## 2022-05-18 ENCOUNTER — RESULT REVIEW (OUTPATIENT)
Age: 72
End: 2022-05-18

## 2022-05-18 ENCOUNTER — NON-APPOINTMENT (OUTPATIENT)
Age: 72
End: 2022-05-18

## 2022-05-18 ENCOUNTER — APPOINTMENT (OUTPATIENT)
Dept: ELECTROPHYSIOLOGY | Facility: CLINIC | Age: 72
End: 2022-05-18
Payer: MEDICARE

## 2022-05-18 ENCOUNTER — OUTPATIENT (OUTPATIENT)
Dept: OUTPATIENT SERVICES | Facility: HOSPITAL | Age: 72
LOS: 1 days | End: 2022-05-18
Payer: COMMERCIAL

## 2022-05-18 ENCOUNTER — APPOINTMENT (OUTPATIENT)
Dept: HEMATOLOGY ONCOLOGY | Facility: CLINIC | Age: 72
End: 2022-05-18
Payer: MEDICARE

## 2022-05-18 ENCOUNTER — APPOINTMENT (OUTPATIENT)
Dept: HEMATOLOGY ONCOLOGY | Facility: CLINIC | Age: 72
End: 2022-05-18

## 2022-05-18 VITALS
BODY MASS INDEX: 23.33 KG/M2 | DIASTOLIC BLOOD PRESSURE: 78 MMHG | SYSTOLIC BLOOD PRESSURE: 137 MMHG | WEIGHT: 176 LBS | OXYGEN SATURATION: 94 % | HEIGHT: 73 IN

## 2022-05-18 VITALS
HEIGHT: 69.88 IN | HEART RATE: 120 BPM | RESPIRATION RATE: 18 BRPM | BODY MASS INDEX: 25.98 KG/M2 | DIASTOLIC BLOOD PRESSURE: 89 MMHG | OXYGEN SATURATION: 95 % | WEIGHT: 179.43 LBS | TEMPERATURE: 98.2 F | SYSTOLIC BLOOD PRESSURE: 156 MMHG

## 2022-05-18 DIAGNOSIS — C85.90 NON-HODGKIN LYMPHOMA, UNSPECIFIED, UNSPECIFIED SITE: Chronic | ICD-10-CM

## 2022-05-18 DIAGNOSIS — R59.0 LOCALIZED ENLARGED LYMPH NODES: ICD-10-CM

## 2022-05-18 DIAGNOSIS — Z95.0 PRESENCE OF CARDIAC PACEMAKER: Chronic | ICD-10-CM

## 2022-05-18 LAB
BASOPHILS # BLD AUTO: 0.08 K/UL — SIGNIFICANT CHANGE UP (ref 0–0.2)
BASOPHILS NFR BLD AUTO: 0.7 % — SIGNIFICANT CHANGE UP (ref 0–2)
DAT POLY-SP REAG RBC QL: NEGATIVE — SIGNIFICANT CHANGE UP
EOSINOPHIL # BLD AUTO: 0.15 K/UL — SIGNIFICANT CHANGE UP (ref 0–0.5)
EOSINOPHIL NFR BLD AUTO: 1.4 % — SIGNIFICANT CHANGE UP (ref 0–6)
HCT VFR BLD CALC: 33.2 % — LOW (ref 39–50)
HGB BLD-MCNC: 9.9 G/DL — LOW (ref 13–17)
IMM GRANULOCYTES NFR BLD AUTO: 0.6 % — SIGNIFICANT CHANGE UP (ref 0–1.5)
LYMPHOCYTES # BLD AUTO: 0.44 K/UL — LOW (ref 1–3.3)
LYMPHOCYTES # BLD AUTO: 4 % — LOW (ref 13–44)
MCHC RBC-ENTMCNC: 24.9 PG — LOW (ref 27–34)
MCHC RBC-ENTMCNC: 29.8 G/DL — LOW (ref 32–36)
MCV RBC AUTO: 83.4 FL — SIGNIFICANT CHANGE UP (ref 80–100)
MONOCYTES # BLD AUTO: 0.69 K/UL — SIGNIFICANT CHANGE UP (ref 0–0.9)
MONOCYTES NFR BLD AUTO: 6.3 % — SIGNIFICANT CHANGE UP (ref 2–14)
NEUTROPHILS # BLD AUTO: 9.46 K/UL — HIGH (ref 1.8–7.4)
NEUTROPHILS NFR BLD AUTO: 87 % — HIGH (ref 43–77)
NRBC # BLD: 0 /100 WBCS — SIGNIFICANT CHANGE UP (ref 0–0)
PLATELET # BLD AUTO: 214 K/UL — SIGNIFICANT CHANGE UP (ref 150–400)
RBC # BLD: 3.98 M/UL — LOW (ref 4.2–5.8)
RBC # FLD: 16.3 % — HIGH (ref 10.3–14.5)
RETICS #: 123.7 K/UL — SIGNIFICANT CHANGE UP (ref 25–125)
RETICS/RBC NFR: 2.9 % — HIGH (ref 0.5–2.5)
WBC # BLD: 10.89 K/UL — HIGH (ref 3.8–10.5)
WBC # FLD AUTO: 10.89 K/UL — HIGH (ref 3.8–10.5)

## 2022-05-18 PROCEDURE — 86880 COOMBS TEST DIRECT: CPT

## 2022-05-18 PROCEDURE — 86900 BLOOD TYPING SEROLOGIC ABO: CPT

## 2022-05-18 PROCEDURE — 86850 RBC ANTIBODY SCREEN: CPT

## 2022-05-18 PROCEDURE — 93281 PM DEVICE PROGR EVAL MULTI: CPT

## 2022-05-18 PROCEDURE — 93000 ELECTROCARDIOGRAM COMPLETE: CPT | Mod: 59

## 2022-05-18 PROCEDURE — 86901 BLOOD TYPING SEROLOGIC RH(D): CPT

## 2022-05-18 PROCEDURE — 99205 OFFICE O/P NEW HI 60 MIN: CPT

## 2022-05-18 RX ORDER — ASPIRIN 81 MG
81 TABLET, DELAYED RELEASE (ENTERIC COATED) ORAL
Refills: 0 | Status: DISCONTINUED | COMMUNITY
End: 2022-05-18

## 2022-05-18 NOTE — ASSESSMENT
[FreeTextEntry1] : 73 yo male here for further workup of non-specific neutrophilia and lymphadenopathy. \par \par His neutrophilia is mild, with decreased lymphocytes and has been present to some degree in 1/2022. In setting of recent infections this is likely reactive. Regarding lymphadenopathy, on exam today he was noted to have multiple areas of enlarged lymph nodes (neck, axillary, right and left inguinal regions). I do have concern for an underlying malignant process given the distribution of his adenopathy and additional biopsies are required, ideally of one of the axillary nodes. It is unlikely that his neutrophilia is related to his lymphadenopathy.\par \par Regarding his anemia, he may have anemia of chronic disease given that his recent iron panel showed a low iron saturation (11%) and low range normal TIBC. Additional testing will be performed today to evaluate etiology of anemia.\par \par Plan:\par - Lymphoma labs today\par - Peripheral blood flow cytometry\par - Will review blood smear today\par - Core biopsy of right or left axillary LN within next 3 weeks\par - Delay further imaging until biopsy results confirm malignant process\par - Hep panel, HIV testing requested\par - Anemia workup, ? anemia of chronic disease\par -  If anemia workup points toward GEORGETTE then he will need a GI referral for potential colonoscopy and/or endoscopy. \par \par ___\par I personally have spent a total of 60 minutes of time on the date of this encounter reviewing test results, documenting findings, providing education, coordinating care and directly consulting with the patient and/or designated family member. \par

## 2022-05-18 NOTE — PHYSICAL EXAM
[Normal] : affect appropriate [Restricted in physically strenuous activity but ambulatory and able to carry out work of a light or sedentary nature] : Status 1- Restricted in physically strenuous activity but ambulatory and able to carry out work of a light or sedentary nature, e.g., light house work, office work [de-identified] : enlarged thyroid, multiple LNs [de-identified] : Right leg trace edema [de-identified] : Multiple left and right cervical lymph nodes at multiple levels, left posterior cervical and submandibular are most prominent > 2 cm, left lower inguinal mass, bilateral axillary LNs that are each > 2 cm in size

## 2022-05-18 NOTE — HISTORY OF PRESENT ILLNESS
[Disease:__________________________] : Disease: [unfilled] [de-identified] : 71 yo with MHx significant for Atrial flutter (on Apixaban), HTN, here for further evaluation of low-level neutrophilia (since 1/2022) and lymphadenopathy. He reports that he has had areas of swelling in his neck on and off for about 2 years which was mostly undergoing workup with his endocrinologist. In the last few months he has noticed increasing size of his left cervical LNs and a right nodule that caused swelling of his face, which has now spontaneously decreased in size significantly.\par \par Today he reports he feels well outside of weight loss. He denies any other constitutional complaints. He weighed 192 lbs in 10/2021 which was down to 183 lbs in December 12/2021(2 months later) which has further decreased down to 179 lbs today. He has not felt himself masses outside of his neck region. On 5/14/22, he went for US duplex of his left lower extremity due to a "tangerine size lump" on the back of his left thigh, which noted a 4.4 cm hypoechoic mass in his left inguinal region without commenting on his perceived posterior thigh mass. Also, on 5/2/22 he underwent US of his thyroid and parathyroid glands where they noticed bilateral cervical lymph nodes with the largest on the left side measuring 2.1 x 1.6 x 1.9 cm and a right level 1 LN measuring 2.2 x 1.2 x 2.3 cm. They saw 3 lymph nodes on the left side and one discrete LN on the right.\par \par  He also recently just finished a 14 day course of Levofloxacin for an uncomplicated phenomena. He was having a dry cough and underwent imaging as an outpatient which found mild left and right pleural effusions, areas of consolidation on the right and retrocardiac airspace involvement (performed 5/2/22). He now feels better without infectious complaints.\par \par In addition, he is currently anemic. He last had a colonoscopy in his recent memory. He had bleeding hemorrhoids last year treated with OTC medications. He denies any tick bites recently. \par \par Social Hx\par - He is currently retired. He used to be an .\par - No significant environmental exposure to chemicals\par - Lives by himself and his wife lives in [place].\par - Former smoker. He smoked for 10 years less than 1 pack a day. He quit 20 years ago\par \par Family hx\par - Two brothers non Hodgkins lymphoma. At least one required chemotherapy. sister had cervical cancer first diagnosed 2007 and then 3 years later with more cancer discovered\par - Mother and father passed away from heart disease and diabetes.\par \par =======================================================\par \par PMD: Dr Amari Hickman\par Endo: Dr Carter Vigil\par \par ======================================================= Principal Discharge DX:	Term  delivered by , current hospitalization  Assessment and plan of treatment:	See your pediatrician 2 days after discharge, limit visiting, put in to sleep on back. If you're supplementing your nursing with formula, make sure you nurse first and fully every feeding to stimulate your milk flow.

## 2022-05-19 ENCOUNTER — RESULT REVIEW (OUTPATIENT)
Age: 72
End: 2022-05-19

## 2022-05-20 ENCOUNTER — APPOINTMENT (OUTPATIENT)
Dept: INTERNAL MEDICINE | Facility: CLINIC | Age: 72
End: 2022-05-20

## 2022-05-24 ENCOUNTER — NON-APPOINTMENT (OUTPATIENT)
Age: 72
End: 2022-05-24

## 2022-05-24 ENCOUNTER — APPOINTMENT (OUTPATIENT)
Dept: CARDIOLOGY | Facility: CLINIC | Age: 72
End: 2022-05-24
Payer: MEDICARE

## 2022-05-24 VITALS
SYSTOLIC BLOOD PRESSURE: 136 MMHG | OXYGEN SATURATION: 97 % | BODY MASS INDEX: 25.34 KG/M2 | HEART RATE: 70 BPM | DIASTOLIC BLOOD PRESSURE: 65 MMHG | WEIGHT: 176 LBS

## 2022-05-24 PROCEDURE — 93000 ELECTROCARDIOGRAM COMPLETE: CPT

## 2022-05-24 PROCEDURE — 99215 OFFICE O/P EST HI 40 MIN: CPT

## 2022-05-24 NOTE — DISCUSSION/SUMMARY
[Systolic Heart Failure] : systolic heart failure [Decompensated] : decompensated [Medication Changes Per Orders] : Medication changes are as documented in orders [Anticoagulation] : anticoagulation 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 [Cardiomyopathy] : cardiomyopathy [NYHA Class II] : NYHA Class II [Hypertensive Cardiomyopathy] : hypertensive cardiomyopathy [Echocardiogram] : echocardiogram [Resynchronization Therapy] : resynchronization therapy [Coronary Artery Disease] : coronary artery disease [Responding to Treatment] : responding to treatment [None] : There are no changes in medication management [Patient] : the patient [de-identified] : Pacemaker interrogation reviewed and good function, good pacing, nearly 100% bi-ventricular, no pauses [de-identified] : Has CRT-D pacemaker [FreeTextEntry1] : discussed need to follow up with pacemaker monitoring. He has disconnected monitoring device and missed recent EP appointment. He and daughter will call to reschedule [de-identified] : recent chest x-ray fluid in minor fissure and bilateral pleural effusions [de-identified] : begin furosemide with potassium [FreeTextEntry2] : and daughter

## 2022-05-24 NOTE — REVIEW OF SYSTEMS
[Negative] : Heme/Lymph [Lower Ext Edema] : lower extremity edema [SOB] : no shortness of breath [Dyspnea on exertion] : not dyspnea during exertion [Leg Claudication] : no intermittent leg claudication [Palpitations] : no palpitations [Orthopnea] : no orthopnea [PND] : no PND

## 2022-05-24 NOTE — CARDIOLOGY SUMMARY
[LVEF ___%] : LVEF [unfilled]% [Mild] : mild LV dysfunction [Moderate] : moderate pulmonary hypertension [Enlarged] : enlarged LA size [Severe] : severe mitral regurgitation [___] : [unfilled] [de-identified] : 6/24/2021 sinus rhythm with bi-ventricular paced rhythm\par 12/23/2021 mild sinus tachycardia, P-sense, biventricular paced.\par 5/24/2022 sinus rhythm 64,  P-sense and biventricular paced. [de-identified] : 6/24/2021 mild to moderate global LV dysfunction with dilated and hypertrophied LV, left atrial enlargement, normal right heart with device wire, mild to moderate MR. Compared to 11/7/2019 hospital study pulmonary hypertension improved and all other findings unchanged.

## 2022-05-24 NOTE — REASON FOR VISIT
[Cardiac Failure] : cardiac failure [Arrhythmia/ECG Abnorrmalities] : arrhythmia/ECG abnormalities [Hypertension] : hypertension [Coronary Artery Disease] : coronary artery disease [Carotid, Aortic and Peripheral Vascular Disease] : carotid, aortic and peripheral vascular disease [Family Member] : family member

## 2022-05-24 NOTE — PHYSICAL EXAM
[Not Palpable] : not palpable [Normal Rate] : normal [Rhythm Regular] : regular [Normal S1] : normal S1 [Normal S2] : normal S2 [II] : a grade 2 [Holosystolic] : holosystolic [[place]] : the murmur was transmitted to the apex [2+] : left 2+ [1+] : left 1+ [0] : left 0 [Normal] : alert and oriented, normal memory [Elevated JVD ____cm] : elevated JVD ~Vcm [___ +] : bilateral [unfilled]U+ pretibial pitting edema [Dullness ____] : dullness [unfilled] [Right Carotid Bruit] : no bruit heard over the right carotid [Left Carotid Bruit] : no bruit heard over the left carotid [de-identified] : left infraclavicular pacemaker pocket

## 2022-05-24 NOTE — HISTORY OF PRESENT ILLNESS
[FreeTextEntry1] : Mr. 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 He was roughly 30 lbs above his last discharge weight and the trigger is not taking his medications for 3 months because he felt well and did not refill prescriptions. He was also found to have atrial flutter which was a new diagnosis. Successfully diuresed 40 lbs in hospital and underwent CRT-P upgrade from Micra due to high pacing burden. Had TE guided cardioversion and remained in sinus rhythm. Continues to feel well, now fully active, able to walk quickly up flights of stairs, walk good distances all without dyspnea and denies orthopnea. \par \par At a prior visit found serum potassium elevated and endocrine had instructed to increase spironolactone, but contacted him to remain unchanged and avoid high potassium sources in diet. Since then doing very well, active, no dyspnea, there is no orthopnea or paroxysmal nocturnal dyspnea. Walks regularly, maybe half mile to and from stores.\par \par Since last seen doing extremely well, no symptoms, walking outdoors for exercise. However, apixaban became too expensive (Medicare D "donut hole") and has not been taking.\par \par Recently weight up, not feeling well, no fever, saw internist, treated with antibiotic for pneumonia and feeling better. However at pacemaker interrogation Optivol indicates volume excess. There is no orthopnea or paroxysmal nocturnal dyspnea

## 2022-05-31 ENCOUNTER — RX RENEWAL (OUTPATIENT)
Age: 72
End: 2022-05-31

## 2022-06-03 ENCOUNTER — APPOINTMENT (OUTPATIENT)
Dept: INTERNAL MEDICINE | Facility: CLINIC | Age: 72
End: 2022-06-03
Payer: MEDICARE

## 2022-06-03 VITALS
BODY MASS INDEX: 26.07 KG/M2 | HEIGHT: 69 IN | SYSTOLIC BLOOD PRESSURE: 136 MMHG | WEIGHT: 176 LBS | DIASTOLIC BLOOD PRESSURE: 76 MMHG

## 2022-06-03 PROCEDURE — 99214 OFFICE O/P EST MOD 30 MIN: CPT | Mod: 25

## 2022-06-03 PROCEDURE — 36415 COLL VENOUS BLD VENIPUNCTURE: CPT

## 2022-06-06 ENCOUNTER — NON-APPOINTMENT (OUTPATIENT)
Age: 72
End: 2022-06-06

## 2022-06-07 LAB
ALBUMIN SERPL ELPH-MCNC: 4 G/DL
ALP BLD-CCNC: 120 U/L
ALT SERPL-CCNC: 8 U/L
ANION GAP SERPL CALC-SCNC: 12 MMOL/L
AST SERPL-CCNC: 17 U/L
BASOPHILS # BLD AUTO: 0.07 K/UL
BASOPHILS NFR BLD AUTO: 0.7 %
BILIRUB SERPL-MCNC: 0.6 MG/DL
BUN SERPL-MCNC: 16 MG/DL
CALCIUM SERPL-MCNC: 9.5 MG/DL
CHLORIDE SERPL-SCNC: 98 MMOL/L
CO2 SERPL-SCNC: 29 MMOL/L
CREAT SERPL-MCNC: 1.4 MG/DL
EGFR: 54 ML/MIN/1.73M2
EOSINOPHIL # BLD AUTO: 0.25 K/UL
EOSINOPHIL NFR BLD AUTO: 2.6 %
GLUCOSE SERPL-MCNC: 112 MG/DL
HCT VFR BLD CALC: 35.9 %
HGB BLD-MCNC: 10.1 G/DL
IMM GRANULOCYTES NFR BLD AUTO: 0.3 %
LYMPHOCYTES # BLD AUTO: 0.47 K/UL
LYMPHOCYTES NFR BLD AUTO: 4.8 %
MAN DIFF?: NORMAL
MCHC RBC-ENTMCNC: 24.3 PG
MCHC RBC-ENTMCNC: 28.1 GM/DL
MCV RBC AUTO: 86.3 FL
MONOCYTES # BLD AUTO: 0.7 K/UL
MONOCYTES NFR BLD AUTO: 7.2 %
NEUTROPHILS # BLD AUTO: 8.25 K/UL
NEUTROPHILS NFR BLD AUTO: 84.4 %
PLATELET # BLD AUTO: 301 K/UL
POTASSIUM SERPL-SCNC: 3.9 MMOL/L
PROT SERPL-MCNC: 8.2 G/DL
RBC # BLD: 4.16 M/UL
RBC # FLD: 16.3 %
SODIUM SERPL-SCNC: 139 MMOL/L
WBC # FLD AUTO: 9.77 K/UL

## 2022-06-13 ENCOUNTER — APPOINTMENT (OUTPATIENT)
Dept: CARDIOLOGY | Facility: CLINIC | Age: 72
End: 2022-06-13
Payer: MEDICARE

## 2022-06-13 ENCOUNTER — NON-APPOINTMENT (OUTPATIENT)
Age: 72
End: 2022-06-13

## 2022-06-13 ENCOUNTER — OUTPATIENT (OUTPATIENT)
Dept: OUTPATIENT SERVICES | Facility: HOSPITAL | Age: 72
LOS: 1 days | Discharge: HOME | End: 2022-06-13

## 2022-06-13 VITALS — DIASTOLIC BLOOD PRESSURE: 65 MMHG | SYSTOLIC BLOOD PRESSURE: 120 MMHG | HEART RATE: 96 BPM

## 2022-06-13 VITALS
OXYGEN SATURATION: 95 % | SYSTOLIC BLOOD PRESSURE: 142 MMHG | BODY MASS INDEX: 25.4 KG/M2 | DIASTOLIC BLOOD PRESSURE: 70 MMHG | HEART RATE: 108 BPM | WEIGHT: 172 LBS

## 2022-06-13 DIAGNOSIS — Z95.0 PRESENCE OF CARDIAC PACEMAKER: Chronic | ICD-10-CM

## 2022-06-13 DIAGNOSIS — C85.90 NON-HODGKIN LYMPHOMA, UNSPECIFIED, UNSPECIFIED SITE: Chronic | ICD-10-CM

## 2022-06-13 DIAGNOSIS — C85.90 NON-HODGKIN LYMPHOMA, UNSPECIFIED, UNSPECIFIED SITE: ICD-10-CM

## 2022-06-13 PROCEDURE — 99214 OFFICE O/P EST MOD 30 MIN: CPT

## 2022-06-13 PROCEDURE — 93000 ELECTROCARDIOGRAM COMPLETE: CPT

## 2022-06-13 NOTE — DISCUSSION/SUMMARY
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 [Cardiomyopathy] : cardiomyopathy [NYHA Class II] : NYHA Class II [Hypertensive Cardiomyopathy] : hypertensive cardiomyopathy [Echocardiogram] : echocardiogram [Resynchronization Therapy] : resynchronization therapy [Coronary Artery Disease] : coronary artery disease [Systolic Heart Failure] : systolic heart failure [Decompensated] : decompensated [Essential Hypertension] : essential hypertension [Improving] : improving [Responding to Treatment] : responding to treatment [None] : There are no changes in medication management [Patient] : the patient [de-identified] : Pacemaker interrogation reviewed and good function, good pacing, nearly 100% bi-ventricular, no pauses [FreeTextEntry1] : discussed need to follow up with pacemaker monitoring. He has disconnected monitoring device and missed recent EP appointment. He and daughter will call to reschedule [de-identified] : recent chest x-ray fluid in minor fissure and bilateral pleural effusions [de-identified] : continue furosemide with potassium [de-identified] : Too many instances of failing to take medications, today he says it is because going to internist and instructed to not eat for blood tests

## 2022-06-13 NOTE — CARDIOLOGY SUMMARY
[LVEF ___%] : LVEF [unfilled]% [Mild] : mild LV dysfunction [Moderate] : moderate pulmonary hypertension [Enlarged] : enlarged LA size [Severe] : severe mitral regurgitation [___] : [unfilled] [de-identified] : 6/24/2021 sinus rhythm with bi-ventricular paced rhythm\par 12/23/2021 mild sinus tachycardia, P-sense, biventricular paced.\par 5/24/2022 sinus rhythm 64,  P-sense and biventricular paced.\par 6/13/2022 sinus tachycardia 108, P-sense and biventricular pace. [de-identified] : 6/24/2021 mild to moderate global LV dysfunction with dilated and hypertrophied LV, left atrial enlargement, normal right heart with device wire, mild to moderate MR. Compared to 11/7/2019 hospital study pulmonary hypertension improved and all other findings unchanged.

## 2022-06-13 NOTE — PHYSICAL EXAM
[Elevated JVD ____cm] : elevated JVD ~Vcm [Not Palpable] : not palpable [Normal Rate] : normal [Rhythm Regular] : regular [Normal S1] : normal S1 [Normal S2] : normal S2 [II] : a grade 2 [Holosystolic] : holosystolic [[place]] : the murmur was transmitted to the apex [___ +] : bilateral [unfilled]U+ pretibial pitting edema [2+] : left 2+ [1+] : left 1+ [0] : left 0 [Dullness ____] : dullness [unfilled] [Normal] : alert and oriented, normal memory [Right Carotid Bruit] : no bruit heard over the right carotid [Left Carotid Bruit] : no bruit heard over the left carotid [de-identified] : left infraclavicular pacemaker pocket

## 2022-06-13 NOTE — REVIEW OF SYSTEMS
[Lower Ext Edema] : lower extremity edema [Negative] : Heme/Lymph [SOB] : no shortness of breath [Dyspnea on exertion] : not dyspnea during exertion [Leg Claudication] : no intermittent leg claudication [Palpitations] : no palpitations [Orthopnea] : no orthopnea [PND] : no PND

## 2022-06-13 NOTE — HISTORY OF PRESENT ILLNESS
[FreeTextEntry1] : Mr. 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 He was roughly 30 lbs above his last discharge weight and the trigger is not taking his medications for 3 months because he felt well and did not refill prescriptions. He was also found to have atrial flutter which was a new diagnosis. Successfully diuresed 40 lbs in hospital and underwent CRT-P upgrade from Micra due to high pacing burden. Had TE guided cardioversion and remained in sinus rhythm. Continues to feel well, now fully active, able to walk quickly up flights of stairs, walk good distances all without dyspnea and denies orthopnea. \par \par At a prior visit found serum potassium elevated and endocrine had instructed to increase spironolactone, but contacted him to remain unchanged and avoid high potassium sources in diet. Since then doing very well, active, no dyspnea, there is no orthopnea or paroxysmal nocturnal dyspnea. Walks regularly, maybe half mile to and from stores.\par \par Since last seen doing extremely well, no symptoms, walking outdoors for exercise. However, apixaban became too expensive (Medicare D "donut hole") and has not been taking.\par \par Recently weight up, not feeling well, no fever, saw internist, treated with antibiotic for pneumonia and feeling better. However at pacemaker interrogation Optivol indicates volume excess. There is no orthopnea or paroxysmal nocturnal dyspnea Since last visIt had good response to diuretic, making large amount of urine and feels better, however Optivol still elevated and had runs of NSVT. Has several prominent lymph nodes and will have biopsy tomorrow and is off apixaban since yesterday.

## 2022-06-14 ENCOUNTER — OUTPATIENT (OUTPATIENT)
Dept: OUTPATIENT SERVICES | Facility: HOSPITAL | Age: 72
LOS: 1 days | End: 2022-06-14
Payer: COMMERCIAL

## 2022-06-14 ENCOUNTER — RESULT REVIEW (OUTPATIENT)
Age: 72
End: 2022-06-14

## 2022-06-14 ENCOUNTER — APPOINTMENT (OUTPATIENT)
Dept: ULTRASOUND IMAGING | Facility: IMAGING CENTER | Age: 72
End: 2022-06-14

## 2022-06-14 DIAGNOSIS — Z00.8 ENCOUNTER FOR OTHER GENERAL EXAMINATION: ICD-10-CM

## 2022-06-14 DIAGNOSIS — C85.90 NON-HODGKIN LYMPHOMA, UNSPECIFIED, UNSPECIFIED SITE: Chronic | ICD-10-CM

## 2022-06-14 DIAGNOSIS — Z95.0 PRESENCE OF CARDIAC PACEMAKER: Chronic | ICD-10-CM

## 2022-06-14 DIAGNOSIS — R59.1 GENERALIZED ENLARGED LYMPH NODES: ICD-10-CM

## 2022-06-14 PROCEDURE — 76942 ECHO GUIDE FOR BIOPSY: CPT

## 2022-06-14 PROCEDURE — 88342 IMHCHEM/IMCYTCHM 1ST ANTB: CPT

## 2022-06-14 PROCEDURE — 88189 FLOWCYTOMETRY/READ 16 & >: CPT

## 2022-06-14 PROCEDURE — 88360 TUMOR IMMUNOHISTOCHEM/MANUAL: CPT | Mod: 26

## 2022-06-14 PROCEDURE — 88173 CYTOPATH EVAL FNA REPORT: CPT | Mod: 26

## 2022-06-14 PROCEDURE — 88305 TISSUE EXAM BY PATHOLOGIST: CPT | Mod: 26

## 2022-06-14 PROCEDURE — 88365 INSITU HYBRIDIZATION (FISH): CPT

## 2022-06-14 PROCEDURE — 88341 IMHCHEM/IMCYTCHM EA ADD ANTB: CPT

## 2022-06-14 PROCEDURE — 88341 IMHCHEM/IMCYTCHM EA ADD ANTB: CPT | Mod: 26,59

## 2022-06-14 PROCEDURE — 88342 IMHCHEM/IMCYTCHM 1ST ANTB: CPT | Mod: 26,59

## 2022-06-14 PROCEDURE — 88307 TISSUE EXAM BY PATHOLOGIST: CPT | Mod: 26

## 2022-06-14 PROCEDURE — 38505 NEEDLE BIOPSY LYMPH NODES: CPT

## 2022-06-14 PROCEDURE — 76942 ECHO GUIDE FOR BIOPSY: CPT | Mod: 26,76

## 2022-06-14 PROCEDURE — 88172 CYTP DX EVAL FNA 1ST EA SITE: CPT

## 2022-06-14 PROCEDURE — 88108 CYTOPATH CONCENTRATE TECH: CPT | Mod: 26,59

## 2022-06-14 PROCEDURE — 88365 INSITU HYBRIDIZATION (FISH): CPT | Mod: 26,59

## 2022-06-14 PROCEDURE — 38505 NEEDLE BIOPSY LYMPH NODES: CPT | Mod: LT

## 2022-06-14 PROCEDURE — 88364 INSITU HYBRIDIZATION (FISH): CPT | Mod: 26

## 2022-06-14 PROCEDURE — 88305 TISSUE EXAM BY PATHOLOGIST: CPT

## 2022-06-14 PROCEDURE — 88307 TISSUE EXAM BY PATHOLOGIST: CPT

## 2022-06-14 PROCEDURE — 88360 TUMOR IMMUNOHISTOCHEM/MANUAL: CPT

## 2022-06-14 PROCEDURE — 88185 FLOWCYTOMETRY/TC ADD-ON: CPT

## 2022-06-14 PROCEDURE — 87205 SMEAR GRAM STAIN: CPT

## 2022-06-14 PROCEDURE — 88173 CYTOPATH EVAL FNA REPORT: CPT

## 2022-06-14 PROCEDURE — 88364 INSITU HYBRIDIZATION (FISH): CPT

## 2022-06-18 LAB
TM INTERPRETATION: SIGNIFICANT CHANGE UP
TM INTERPRETATION: SIGNIFICANT CHANGE UP

## 2022-06-20 ENCOUNTER — APPOINTMENT (OUTPATIENT)
Dept: HEMATOLOGY ONCOLOGY | Facility: CLINIC | Age: 72
End: 2022-06-20
Payer: MEDICARE

## 2022-06-20 ENCOUNTER — RESULT REVIEW (OUTPATIENT)
Age: 72
End: 2022-06-20

## 2022-06-20 ENCOUNTER — APPOINTMENT (OUTPATIENT)
Dept: HEMATOLOGY ONCOLOGY | Facility: CLINIC | Age: 72
End: 2022-06-20

## 2022-06-20 VITALS
DIASTOLIC BLOOD PRESSURE: 79 MMHG | OXYGEN SATURATION: 95 % | WEIGHT: 178.55 LBS | HEART RATE: 110 BPM | RESPIRATION RATE: 18 BRPM | BODY MASS INDEX: 26.37 KG/M2 | TEMPERATURE: 97.7 F | SYSTOLIC BLOOD PRESSURE: 146 MMHG

## 2022-06-20 LAB
BASOPHILS # BLD AUTO: 0.07 K/UL — SIGNIFICANT CHANGE UP (ref 0–0.2)
BASOPHILS NFR BLD AUTO: 0.7 % — SIGNIFICANT CHANGE UP (ref 0–2)
EOSINOPHIL # BLD AUTO: 0.23 K/UL — SIGNIFICANT CHANGE UP (ref 0–0.5)
EOSINOPHIL NFR BLD AUTO: 2.2 % — SIGNIFICANT CHANGE UP (ref 0–6)
HCT VFR BLD CALC: 38.2 % — LOW (ref 39–50)
HGB BLD-MCNC: 11 G/DL — LOW (ref 13–17)
IMM GRANULOCYTES NFR BLD AUTO: 0.4 % — SIGNIFICANT CHANGE UP (ref 0–1.5)
LYMPHOCYTES # BLD AUTO: 0.51 K/UL — LOW (ref 1–3.3)
LYMPHOCYTES # BLD AUTO: 5 % — LOW (ref 13–44)
MCHC RBC-ENTMCNC: 23.9 PG — LOW (ref 27–34)
MCHC RBC-ENTMCNC: 28.8 G/DL — LOW (ref 32–36)
MCV RBC AUTO: 83 FL — SIGNIFICANT CHANGE UP (ref 80–100)
MONOCYTES # BLD AUTO: 0.5 K/UL — SIGNIFICANT CHANGE UP (ref 0–0.9)
MONOCYTES NFR BLD AUTO: 4.9 % — SIGNIFICANT CHANGE UP (ref 2–14)
NEUTROPHILS # BLD AUTO: 8.88 K/UL — HIGH (ref 1.8–7.4)
NEUTROPHILS NFR BLD AUTO: 86.8 % — HIGH (ref 43–77)
NRBC # BLD: 0 /100 WBCS — SIGNIFICANT CHANGE UP (ref 0–0)
PLATELET # BLD AUTO: 296 K/UL — SIGNIFICANT CHANGE UP (ref 150–400)
RBC # BLD: 4.6 M/UL — SIGNIFICANT CHANGE UP (ref 4.2–5.8)
RBC # FLD: 17.2 % — HIGH (ref 10.3–14.5)
WBC # BLD: 10.23 K/UL — SIGNIFICANT CHANGE UP (ref 3.8–10.5)
WBC # FLD AUTO: 10.23 K/UL — SIGNIFICANT CHANGE UP (ref 3.8–10.5)

## 2022-06-20 PROCEDURE — 99215 OFFICE O/P EST HI 40 MIN: CPT

## 2022-07-06 ENCOUNTER — APPOINTMENT (OUTPATIENT)
Dept: CARDIOLOGY | Facility: CLINIC | Age: 72
End: 2022-07-06

## 2022-07-06 ENCOUNTER — NON-APPOINTMENT (OUTPATIENT)
Age: 72
End: 2022-07-06

## 2022-07-06 VITALS
HEIGHT: 69 IN | HEART RATE: 110 BPM | OXYGEN SATURATION: 94 % | SYSTOLIC BLOOD PRESSURE: 130 MMHG | WEIGHT: 177 LBS | BODY MASS INDEX: 26.22 KG/M2 | DIASTOLIC BLOOD PRESSURE: 70 MMHG

## 2022-07-06 PROCEDURE — 93000 ELECTROCARDIOGRAM COMPLETE: CPT

## 2022-07-06 PROCEDURE — 99215 OFFICE O/P EST HI 40 MIN: CPT

## 2022-07-06 NOTE — PHYSICAL EXAM
[Elevated JVD ____cm] : elevated JVD ~Vcm [Not Palpable] : not palpable [Normal Rate] : normal [Rhythm Regular] : regular [Normal S1] : normal S1 [Normal S2] : normal S2 [II] : a grade 2 [Holosystolic] : holosystolic [[place]] : the murmur was transmitted to the apex [2+] : left 2+ [1+] : left 1+ [0] : left 0 [Dullness ____] : dullness [unfilled] [Normal] : alert and oriented, normal memory [___ +] : bilateral [unfilled]U+ pretibial pitting edema [Right Carotid Bruit] : no bruit heard over the right carotid [Left Carotid Bruit] : no bruit heard over the left carotid [de-identified] : left infraclavicular pacemaker pocket

## 2022-07-06 NOTE — HISTORY OF PRESENT ILLNESS
[FreeTextEntry1] : Mr. 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 He was roughly 30 lbs above his last discharge weight and the trigger is not taking his medications for 3 months because he felt well and did not refill prescriptions. He was also found to have atrial flutter which was a new diagnosis. Successfully diuresed 40 lbs in hospital and underwent CRT-P upgrade from Micra due to high pacing burden. Had TE guided cardioversion and remained in sinus rhythm. Continues to feel well, now fully active, able to walk quickly up flights of stairs, walk good distances all without dyspnea and denies orthopnea. \par \par At a prior visit found serum potassium elevated and endocrine had instructed to increase spironolactone, but contacted him to remain unchanged and avoid high potassium sources in diet. Since then doing very well, active, no dyspnea, there is no orthopnea or paroxysmal nocturnal dyspnea. Walks regularly, maybe half mile to and from stores.\par \par Since last seen doing extremely well, no symptoms, walking outdoors for exercise. However, apixaban became too expensive (Medicare D "donut hole") and has not been taking.\par \par Recently weight up, not feeling well, no fever, saw internist, treated with antibiotic for pneumonia and feeling better. However at pacemaker interrogation Optivol indicated volume excess. There is no orthopnea or paroxysmal nocturnal dyspnea At last visit observed good response to diuretic, making large amount of urine and feeling better, however Optivol still elevated and had runs of NSVT. Developed several prominent lymph nodes and biopsy  planned, off apixaban. Biopsy accomplished, diagnosis seems to be follicular lymphoma. Resumed apixaban.

## 2022-07-06 NOTE — DISCUSSION/SUMMARY
[Patient] : the patient [EKG obtained to assist in diagnosis and management of assessed problem(s)] : EKG obtained to assist in diagnosis and management of assessed problem(s) [Decompensated] : decompensated [Anticoagulation] : anticoagulation 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 [Cardiomyopathy] : cardiomyopathy [NYHA Class II] : NYHA Class II [Hypertensive Cardiomyopathy] : hypertensive cardiomyopathy [Echocardiogram] : echocardiogram [Resynchronization Therapy] : resynchronization therapy [Systolic Heart Failure] : systolic heart failure [Essential Hypertension] : essential hypertension [Improving] : improving [Responding to Treatment] : responding to treatment [None] : There are no changes in medication management [de-identified] : Atrial flutter [de-identified] : Contacted EP to review, bring to clinic and determine further management [de-identified] : consider flutter ablation [de-identified] : 100% bi-ventricular paced [FreeTextEntry1] : discussed need to follow up with pacemaker monitoring. He has disconnected monitoring device and missed recent EP appointment. He and daughter will call to reschedule [de-identified] : management complicated by heart rhythm [de-identified] : continue furosemide with potassium [de-identified] : Too many instances of failing to take medications, today he says it is because going to internist and instructed to not eat for blood tests

## 2022-07-06 NOTE — CARDIOLOGY SUMMARY
[LVEF ___%] : LVEF [unfilled]% [Mild] : mild LV dysfunction [Moderate] : moderate pulmonary hypertension [Enlarged] : enlarged LA size [Severe] : severe mitral regurgitation [___] : [unfilled] [de-identified] : 6/24/2021 sinus rhythm with bi-ventricular paced rhythm\par 12/23/2021 mild sinus tachycardia, P-sense, biventricular paced.\par 5/24/2022 sinus rhythm 64,  P-sense and biventricular paced.\par 6/13/2022 sinus tachycardia 108, P-sense and biventricular pace.\par 7/6/2022  probable atrial tachycardia with biventricular pacing 2:1, heart rate 108. [de-identified] : 6/24/2021 mild to moderate global LV dysfunction with dilated and hypertrophied LV, left atrial enlargement, normal right heart with device wire, mild to moderate MR. Compared to 11/7/2019 hospital study pulmonary hypertension improved and all other findings unchanged.

## 2022-07-13 ENCOUNTER — APPOINTMENT (OUTPATIENT)
Dept: ELECTROPHYSIOLOGY | Facility: CLINIC | Age: 72
End: 2022-07-13

## 2022-07-13 VITALS — SYSTOLIC BLOOD PRESSURE: 151 MMHG | HEART RATE: 108 BPM | DIASTOLIC BLOOD PRESSURE: 91 MMHG | OXYGEN SATURATION: 92 %

## 2022-07-13 LAB
ALBUMIN MFR SERPL ELPH: 43.5 %
ALBUMIN MFR SERPL ELPH: 43.7 %
ALBUMIN SERPL ELPH-MCNC: 3.9 G/DL
ALBUMIN SERPL ELPH-MCNC: 4 G/DL
ALBUMIN SERPL-MCNC: 3.4 G/DL
ALBUMIN SERPL-MCNC: 3.4 G/DL
ALBUMIN/GLOB SERPL: 0.8 RATIO
ALBUMIN/GLOB SERPL: 0.8 RATIO
ALP BLD-CCNC: 118 U/L
ALP BLD-CCNC: 143 U/L
ALPHA1 GLOB MFR SERPL ELPH: 6 %
ALPHA1 GLOB MFR SERPL ELPH: 6.7 %
ALPHA1 GLOB SERPL ELPH-MCNC: 0.5 G/DL
ALPHA1 GLOB SERPL ELPH-MCNC: 0.5 G/DL
ALPHA2 GLOB MFR SERPL ELPH: 13.7 %
ALPHA2 GLOB MFR SERPL ELPH: 14.5 %
ALPHA2 GLOB SERPL ELPH-MCNC: 1.1 G/DL
ALPHA2 GLOB SERPL ELPH-MCNC: 1.1 G/DL
ALT SERPL-CCNC: 19 U/L
ALT SERPL-CCNC: 7 U/L
ANION GAP SERPL CALC-SCNC: 14 MMOL/L
ANION GAP SERPL CALC-SCNC: 14 MMOL/L
AST SERPL-CCNC: 13 U/L
AST SERPL-CCNC: 24 U/L
B-GLOBULIN MFR SERPL ELPH: 9.7 %
B-GLOBULIN MFR SERPL ELPH: 9.8 %
B-GLOBULIN SERPL ELPH-MCNC: 0.7 G/DL
B-GLOBULIN SERPL ELPH-MCNC: 0.8 G/DL
B2 MICROGLOB SERPL-MCNC: 5.7 MG/L
B2 MICROGLOB SERPL-MCNC: 5.8 MG/L
BILIRUB SERPL-MCNC: 0.5 MG/DL
BILIRUB SERPL-MCNC: 0.8 MG/DL
BUN SERPL-MCNC: 18 MG/DL
BUN SERPL-MCNC: 18 MG/DL
CALCIUM SERPL-MCNC: 9.5 MG/DL
CALCIUM SERPL-MCNC: 9.6 MG/DL
CHLORIDE SERPL-SCNC: 104 MMOL/L
CHLORIDE SERPL-SCNC: 106 MMOL/L
CO2 SERPL-SCNC: 24 MMOL/L
CO2 SERPL-SCNC: 26 MMOL/L
CREAT SERPL-MCNC: 1.25 MG/DL
CREAT SERPL-MCNC: 1.3 MG/DL
DEPRECATED KAPPA LC FREE/LAMBDA SER: 1.44 RATIO
DEPRECATED KAPPA LC FREE/LAMBDA SER: 1.64 RATIO
EGFR: 59 ML/MIN/1.73M2
EGFR: 62 ML/MIN/1.73M2
FERRITIN SERPL-MCNC: 193 NG/ML
FERRITIN SERPL-MCNC: 245 NG/ML
FOLATE SERPL-MCNC: 8.1 NG/ML
GAMMA GLOB FLD ELPH-MCNC: 2 G/DL
GAMMA GLOB FLD ELPH-MCNC: 2.1 G/DL
GAMMA GLOB MFR SERPL ELPH: 25.4 %
GAMMA GLOB MFR SERPL ELPH: 27 %
GLUCOSE SERPL-MCNC: 113 MG/DL
GLUCOSE SERPL-MCNC: 93 MG/DL
HAPTOGLOB SERPL-MCNC: 317 MG/DL
HAPTOGLOB SERPL-MCNC: 367 MG/DL
HBV CORE IGG+IGM SER QL: NONREACTIVE
HBV SURFACE AB SER QL: NONREACTIVE
HBV SURFACE AG SER QL: NONREACTIVE
HCV AB SER QL: NONREACTIVE
HCV S/CO RATIO: 0.21 S/CO
HIV1+2 AB SPEC QL IA.RAPID: NONREACTIVE
IGA SER QL IEP: 184 MG/DL
IGA SER QL IEP: 199 MG/DL
IGG SER QL IEP: 1758 MG/DL
IGG SER QL IEP: 1870 MG/DL
IGM SER QL IEP: 510 MG/DL
IGM SER QL IEP: 560 MG/DL
INTERPRETATION SERPL IEP-IMP: NORMAL
INTERPRETATION SERPL IEP-IMP: NORMAL
IRON SATN MFR SERPL: 11 %
IRON SATN MFR SERPL: 8 %
IRON SERPL-MCNC: 21 UG/DL
IRON SERPL-MCNC: 32 UG/DL
KAPPA LC CSF-MCNC: 6.58 MG/DL
KAPPA LC CSF-MCNC: 7.3 MG/DL
KAPPA LC SERPL-MCNC: 10.52 MG/DL
KAPPA LC SERPL-MCNC: 10.77 MG/DL
LDH SERPL-CCNC: 292 U/L
LDH SERPL-CCNC: 311 U/L
M PROTEIN MFR SERPL ELPH: NORMAL
M PROTEIN MFR SERPL ELPH: NORMAL
M PROTEIN SPEC IFE-MCNC: NORMAL
M PROTEIN SPEC IFE-MCNC: NORMAL
MAGNESIUM SERPL-MCNC: 1.8 MG/DL
MONOCLON BAND OBS SERPL: NORMAL
MONOCLON BAND OBS SERPL: NORMAL
PHOSPHATE SERPL-MCNC: 3.3 MG/DL
POTASSIUM SERPL-SCNC: 4 MMOL/L
POTASSIUM SERPL-SCNC: 4.9 MMOL/L
PROT SERPL-MCNC: 7.7 G/DL
PROT SERPL-MCNC: 7.9 G/DL
SODIUM SERPL-SCNC: 143 MMOL/L
SODIUM SERPL-SCNC: 143 MMOL/L
TIBC SERPL-MCNC: 257 UG/DL
TIBC SERPL-MCNC: 304 UG/DL
UIBC SERPL-MCNC: 236 UG/DL
UIBC SERPL-MCNC: 272 UG/DL
VIT B12 SERPL-MCNC: 562 PG/ML

## 2022-07-13 PROCEDURE — 93000 ELECTROCARDIOGRAM COMPLETE: CPT | Mod: 59

## 2022-07-13 PROCEDURE — 93281 PM DEVICE PROGR EVAL MULTI: CPT

## 2022-07-13 NOTE — ASSESSMENT
[FreeTextEntry1] : 71 yo male with multiple areas of palpable lymphadenopathy at this point shown to be at least follicular lymphoma grade IIIA in the setting of a steady decline in weight > 10 lbs in the last 6 months without other constitutional complaints. 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 He underwent biopsy of both a cervical lymph node and a left axilla lymph node. The left axillar core biopsy showed grade 3A Follicular lymphoma that was positive for a BCL6 (3q27) breakpoint translocation and negative for MYC Rearrangement or BCL2-IGH gene rearrangement [translocation t(14;18) present in ~ 85% of [place]]. The cervical node FNA is pending. Peripheral blood flow cytometry did not show any diagnostic abnormalities. LDH elevated to 311. B2M is 5.7 mg/L.\par \par He also has MGUS with 3 separate monoclonal gammopathies I suspect is related to his NHL. He has M Judd 1 showing IgG Lambda at 0.3 g/dl, M Judd 2 showing IgG Kappa at 0.2 g/dl, M Judd 3 showing IgM Lambda (unable to quantitate). There is no suspicion for myeloma or waldenstroms at this time and his M-spikes will be monitored. He has no elevation in kappa/ lambda FLC ration, but individual light chains are both elevated, kappa at 10.77 mg/dL and lambda at 6.58 mg/dL.\par \par Regarding his anemia, he may have anemia of chronic disease given that his recent iron panel showed a low iron saturation (11%) and low range normal TIBC. Additional testing will be performed today to evaluate etiology of anemia. His neutrophilia is mild, with decreased lymphocytes and has been present to some degree in 1/2022. In the setting of recent infections this is likely reactive. It is unlikely that his neutrophilia is related to his lymphadenopathy. This will be monitored.\par \par CBC today: WBC 10.3 (ANC 8.8), Hb 11.0, Plt 296\par \par Plan:\par - Repeat Lymphoma labs today\par - Hepatitis / HIV screen negative.\par - At this time he at least Grade 3A follicular lymphoma, he should now undergo PET-CT to get a baseline and look for additional areas of increased FDG-avidity that may suggest higher grade lymphoma\par - Awaiting final report from cervical biopsy\par - Anemia workup suggestive of anemia of chronic disease with normal ferritin, low iron sat and serum iron in the setting of inappropriately low level increase in reticulocyte count (inadequate response in the setting of adequate iron stores - retic 2.9%), defer referral for colonoscopy and/or endoscopy as there are no signs of active blood loss.\par - Discussed with patient and daughter Vonnie\par - Follow up in 2 months \par \par ___\par I personally have spent a total of 40 minutes of time on the date of this encounter reviewing test results, documenting findings, providing education, coordinating care and directly consulting with the patient and/or designated family member. \par

## 2022-07-13 NOTE — PHYSICAL EXAM
[Restricted in physically strenuous activity but ambulatory and able to carry out work of a light or sedentary nature] : Status 1- Restricted in physically strenuous activity but ambulatory and able to carry out work of a light or sedentary nature, e.g., light house work, office work [Normal] : affect appropriate [de-identified] : enlarged thyroid, multiple LNs [de-identified] : Right leg trace edema [de-identified] : Multiple left and right cervical lymph nodes at multiple levels, left posterior cervical and submandibular are most prominent > 2 cm, left lower inguinal mass, bilateral axillary LNs that are each > 2 cm in size. Left axillary is 1cm. Bilateral inguinal lymph nodes 1cm.

## 2022-07-13 NOTE — HISTORY OF PRESENT ILLNESS
[Disease:__________________________] : Disease: [unfilled] [Treatment Protocol] : Treatment Protocol [de-identified] : 73 yo with MHx significant for Atrial flutter (on Apixaban), HTN, here for further evaluation of low-level neutrophilia (since 1/2022) and lymphadenopathy. He reports that he has had areas of swelling in his neck on and off for about 2 years which was mostly undergoing workup with his endocrinologist. In the last few months he has noticed increasing size of his left cervical LNs and a right nodule that caused swelling of his face, which has now spontaneously decreased in size significantly.\par \par Today he reports he feels well outside of weight loss. He denies any other constitutional complaints. He weighed 192 lbs in 10/2021 which was down to 183 lbs in December 12/2021(2 months later) which has further decreased down to 179 lbs today. He has not felt himself masses outside of his neck region. On 5/14/22, he went for US duplex of his left lower extremity due to a "tangerine size lump" on the back of his left thigh, which noted a 4.4 cm hypoechoic mass in his left inguinal region without commenting on his perceived posterior thigh mass. Also, on 5/2/22 he underwent US of his thyroid and parathyroid glands where they noticed bilateral cervical lymph nodes with the largest on the left side measuring 2.1 x 1.6 x 1.9 cm and a right level 1 LN measuring 2.2 x 1.2 x 2.3 cm. They saw 3 lymph nodes on the left side and one discrete LN on the right.\par \par  He also recently just finished a 14 day course of Levofloxacin for an uncomplicated phenomena. He was having a dry cough and underwent imaging as an outpatient which found mild left and right pleural effusions, areas of consolidation on the right and retrocardiac airspace involvement (performed 5/2/22). He now feels better without infectious complaints.\par \par In addition, he is currently anemic. He last had a colonoscopy in his recent memory. He had bleeding hemorrhoids last year treated with OTC medications. He denies any tick bites recently. \par \par Social Hx\par - He is currently retired. He used to be an .\par - No significant environmental exposure to chemicals\par - Lives by himself and his wife lives in [place].\par - Former smoker. He smoked for 10 years less than 1 pack a day. He quit 20 years ago\par \par Family hx\par - Two brothers non Hodgkins lymphoma. At least one required chemotherapy. sister had cervical cancer first diagnosed 2007 and then 3 years later with more cancer discovered\par - Mother and father passed away from heart disease and diabetes.\par \par =======================================================\par Care Providers:\par \par PMD: Dr Amari Hickman\par Endo: Dr Carter Vigil\[facility] Cardiologist: Dr. Don [phone] fax [phone]\par \par =======================================================\par \par Vonnie (daughter): [phone] - takes all healthcare related calls [de-identified] : PENDING [de-identified] : Fine Needle Aspiration Report - Auth (Verified)\par Specimen(s) Submitted\par 1. AXILLA, LEFT, US GUIDED CORE BIOPSY AND FNA\par 2. LYMPH NODE, CERVICAL, LEFT POSTERIOR, US GUIDED CORE BIOPSY AND FNA\par \par \par Final Diagnosis\par 1. AXILLA, LEFT, US GUIDED CORE BIOPSY AND FNA\par POSITIVE FOR MALIGNANT CELLS.\par B-cell lymphoma of follicular center origin, most consistent with\par follicular lymphoma, grade 3A.  See note.\par \par Fine Needle Aspiration Addendum Report - Auth (Verified)\par \par Addendum\par 2. LYMPH NODE, CERVICAL, LEFT POSTERIOR, US GUIDED CORE BIOPSY AND FNA\par POSITIVE FOR MALIGNANT CELLS.\par B-cell lymphoma of follicular center origin with high proliferation index.\par See note.\par \par Note:\par The neoplastic B cells demonstrate a follicular center B-cell phenotype with MUM-1 co-expression and a high proliferation index.  Follicular dendritic meshwork is present in most areas of the biopsy, consistent\par with follicular lymphoma.  However, there is one focal area with increased larger cells and lack of follicular dendritic meshwork. Therefore, a high grade component can not be excluded.  If clinically indicated, suggest excisional biopsy for definitive classification.\par \par Immunohistochemical stains   (CD3, CD5, CD10, CD20, CD21, CD23, CD30, BCL-6, BCL-2, cyclinD1, ki-67, c-MYC, PAX-5, MUM-1, p53, ISAURO) were performed on block 2C.  The neoplastic cells are positive for CD20, PAX-5, BCL-6, BCL-2, MUM-1 (partial), dim p53; negative CD5, CD10, CD30, cyclinD1, ISAURO.  CD21 and CD23 highlight follicular dendritic meshwork in most areas with focal absence of follicular dendritic meshwork. \par Ki-67 proliferation index is approximately 60 to 70%.  C-MYC stains approximately 20 to 30% of cells.  CD3 and CD5 highlight some T-cells in the background. [de-identified] : 6/14/22 FNA of Left axilla LN: FLUORESCENCE IN-SITU HYBRIDIZATION (FISH)\par 1. No evidence of MYC Rearrangement\par 2. No evidence of BCL2-IGH [translocation t(14;18)] gene rearrangement\par 3. 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 [FreeTextEntry1] : Pending complete workup [de-identified] : 5/18/22: Initial Visit.\par \par 6/20/22: Follow-up. Patient feels well overall. Lymphoma labs and left axilla LN biopsy revealed grade 3A follicular lymphoma, IgG Lambda and Kappa MGUS. He reports lymph nodes have been stable. Heart function is stable. He denies having any recent fevers, chills, nausea. No weight changes.\par \par ___\par A comprehensive review of systems was performed including constitutional, eyes, ENT, cardiovascular, respiratory, gastrointestinal, genitourinary, musculoskeletal, integumentary, neurological, psychiatric and hematologic / lymphatic. All pertinent positives are included in the H&P under interval history above and the remaining review of systems listed are negative.

## 2022-07-26 ENCOUNTER — NON-APPOINTMENT (OUTPATIENT)
Age: 72
End: 2022-07-26

## 2022-08-01 ENCOUNTER — NON-APPOINTMENT (OUTPATIENT)
Age: 72
End: 2022-08-01

## 2022-08-02 ENCOUNTER — NON-APPOINTMENT (OUTPATIENT)
Age: 72
End: 2022-08-02

## 2022-08-02 ENCOUNTER — APPOINTMENT (OUTPATIENT)
Dept: CARDIOLOGY | Facility: CLINIC | Age: 72
End: 2022-08-02

## 2022-08-02 VITALS
WEIGHT: 199 LBS | OXYGEN SATURATION: 96 % | DIASTOLIC BLOOD PRESSURE: 77 MMHG | HEART RATE: 101 BPM | BODY MASS INDEX: 29.39 KG/M2 | SYSTOLIC BLOOD PRESSURE: 118 MMHG

## 2022-08-02 PROCEDURE — 93000 ELECTROCARDIOGRAM COMPLETE: CPT

## 2022-08-02 PROCEDURE — 99215 OFFICE O/P EST HI 40 MIN: CPT | Mod: 25

## 2022-08-02 RX ORDER — FUROSEMIDE 40 MG/1
40 TABLET ORAL DAILY
Qty: 90 | Refills: 3 | Status: DISCONTINUED | COMMUNITY
Start: 2022-05-24 | End: 2022-08-02

## 2022-08-02 RX ORDER — IVABRADINE 5 MG/1
5 TABLET, FILM COATED ORAL
Qty: 180 | Refills: 1 | Status: DISCONTINUED | COMMUNITY
Start: 2022-07-13 | End: 2022-08-02

## 2022-08-02 NOTE — CARDIOLOGY SUMMARY
[LVEF ___%] : LVEF [unfilled]% [Mild] : mild LV dysfunction [Moderate] : moderate pulmonary hypertension [Enlarged] : enlarged LA size [Severe] : severe mitral regurgitation [___] : [unfilled] [de-identified] : 6/24/2021 sinus rhythm with bi-ventricular paced rhythm\par 12/23/2021 mild sinus tachycardia, P-sense, biventricular paced.\par 5/24/2022 sinus rhythm 64,  P-sense and biventricular paced.\par 6/13/2022 sinus tachycardia 108, P-sense and biventricular pace.\par 7/6/2022  probable atrial tachycardia with biventricular pacing 2:1, heart rate 108.\par 8/2/2022 sinus tachycardia and biventricular paced at 102. [de-identified] : 6/24/2021 mild to moderate global LV dysfunction with dilated and hypertrophied LV, left atrial enlargement, normal right heart with device wire, mild to moderate MR. Compared to 11/7/2019 hospital study pulmonary hypertension improved and all other findings unchanged.

## 2022-08-02 NOTE — PHYSICAL EXAM
[Elevated JVD ____cm] : elevated JVD ~Vcm [Not Palpable] : not palpable [Normal Rate] : normal [Rhythm Regular] : regular [Normal S1] : normal S1 [Normal S2] : normal S2 [II] : a grade 2 [Holosystolic] : holosystolic [[place]] : the murmur was transmitted to the apex [2+] : left 2+ [1+] : left 1+ [0] : left 0 [Dullness ____] : dullness [unfilled] [Normal] : alert and oriented, normal memory [___ +] : bilateral [unfilled]U+ pretibial pitting edema [Right Carotid Bruit] : no bruit heard over the right carotid [Left Carotid Bruit] : no bruit heard over the left carotid [de-identified] : left infraclavicular pacemaker pocket

## 2022-08-02 NOTE — DISCUSSION/SUMMARY
[Patient] : the patient [EKG obtained to assist in diagnosis and management of assessed problem(s)] : EKG obtained to assist in diagnosis and management of assessed problem(s) [Anticoagulation] : anticoagulation 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 [Coronary Artery Disease] : coronary artery disease [Systolic Heart Failure] : systolic heart failure [Decompensated] : decompensated [Deteriorating] : deteriorating [Medication Changes Per Orders] : Medication changes are as documented in orders [Essential Hypertension] : essential hypertension [Responding to Treatment] : responding to treatment [None] : There are no changes in medication management [___ Week(s)] : in [unfilled] week(s) [de-identified] : Contacted EP who attempted to start ivradabine, but could not afford it [de-identified] : 100% bi-ventricular paced [FreeTextEntry1] : discussed need to follow up with pacemaker monitoring. He has disconnected monitoring device and missed recent EP appointment. He and daughter will call to reschedule [de-identified] : sharifa [de-identified] : replace furosemide with torsemide [de-identified] : Too many instances of failing to take medications, today he says it is because going to internist and instructed to not eat for blood tests [FreeTextEntry2] : and daughter [FreeTextEntry3] : with BMP

## 2022-08-02 NOTE — HISTORY OF PRESENT ILLNESS
[FreeTextEntry1] : Mr. 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 He was roughly 30 lbs above his last discharge weight and the trigger is not taking his medications for 3 months because he felt well and did not refill prescriptions. He was also found to have atrial flutter which was a new diagnosis. Successfully diuresed 40 lbs in hospital and underwent CRT-P upgrade from Micra due to high pacing burden. Had TE guided cardioversion and remained in sinus rhythm. Continues to feel well, now fully active, able to walk quickly up flights of stairs, walk good distances all without dyspnea and denies orthopnea. \par \par At a prior visit found serum potassium elevated and endocrine had instructed to increase spironolactone, but contacted him to remain unchanged and avoid high potassium sources in diet. Since then doing very well, active, no dyspnea, there is no orthopnea or paroxysmal nocturnal dyspnea. Walks regularly, maybe half mile to and from stores.\par \par Since last seen doing extremely well, no symptoms, walking outdoors for exercise. However, apixaban became too expensive (Medicare D "donut hole") and has not been taking.\par \par Recently weight up, not feeling well, no fever, saw internist, treated with antibiotic for pneumonia and feeling better. However at pacemaker interrogation Optivol indicated volume excess. There is no orthopnea or paroxysmal nocturnal dyspnea At last visit observed good response to diuretic, making large amount of urine and feeling better, however Optivol still elevated and had runs of NSVT. Developed several prominent lymph nodes and biopsy  planned, off apixaban. Biopsy accomplished, diagnosis seems to be follicular lymphoma. Resumed apixaban.\par \par Referred to EP to consider possible atrial tachycardia, flutter, but concluded was sinus tachycardia. He is short of breath on minimal exertion, but denies orthopnea or paroxysmal nocturnal dyspnea

## 2022-08-06 ENCOUNTER — APPOINTMENT (OUTPATIENT)
Dept: NUCLEAR MEDICINE | Facility: IMAGING CENTER | Age: 72
End: 2022-08-06

## 2022-08-06 ENCOUNTER — OUTPATIENT (OUTPATIENT)
Dept: OUTPATIENT SERVICES | Facility: HOSPITAL | Age: 72
LOS: 1 days | End: 2022-08-06
Payer: COMMERCIAL

## 2022-08-06 DIAGNOSIS — Z00.8 ENCOUNTER FOR OTHER GENERAL EXAMINATION: ICD-10-CM

## 2022-08-06 DIAGNOSIS — C85.90 NON-HODGKIN LYMPHOMA, UNSPECIFIED, UNSPECIFIED SITE: Chronic | ICD-10-CM

## 2022-08-06 DIAGNOSIS — Z95.0 PRESENCE OF CARDIAC PACEMAKER: Chronic | ICD-10-CM

## 2022-08-06 DIAGNOSIS — C82.20 FOLLICULAR LYMPHOMA GRADE III, UNSPECIFIED, UNSPECIFIED SITE: ICD-10-CM

## 2022-08-06 PROCEDURE — 78815 PET IMAGE W/CT SKULL-THIGH: CPT | Mod: 26,PI

## 2022-08-06 PROCEDURE — 78815 PET IMAGE W/CT SKULL-THIGH: CPT

## 2022-08-06 PROCEDURE — A9552: CPT

## 2022-08-09 ENCOUNTER — APPOINTMENT (OUTPATIENT)
Dept: CARDIOLOGY | Facility: CLINIC | Age: 72
End: 2022-08-09

## 2022-08-09 VITALS
SYSTOLIC BLOOD PRESSURE: 154 MMHG | WEIGHT: 175 LBS | BODY MASS INDEX: 25.92 KG/M2 | OXYGEN SATURATION: 96 % | RESPIRATION RATE: 17 BRPM | DIASTOLIC BLOOD PRESSURE: 84 MMHG | HEIGHT: 69 IN | HEART RATE: 106 BPM

## 2022-08-09 VITALS — DIASTOLIC BLOOD PRESSURE: 75 MMHG | HEART RATE: 104 BPM | SYSTOLIC BLOOD PRESSURE: 130 MMHG

## 2022-08-09 PROCEDURE — 36415 COLL VENOUS BLD VENIPUNCTURE: CPT

## 2022-08-09 PROCEDURE — 99214 OFFICE O/P EST MOD 30 MIN: CPT | Mod: 25

## 2022-08-09 NOTE — DISCUSSION/SUMMARY
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 [Cardiomyopathy] : cardiomyopathy [NYHA Class II] : NYHA Class II [Hypertensive Cardiomyopathy] : hypertensive cardiomyopathy [Echocardiogram] : echocardiogram [Resynchronization Therapy] : resynchronization therapy [Coronary Artery Disease] : coronary artery disease [Systolic Heart Failure] : systolic heart failure [Improving] : improving [Decompensated] : decompensated [Basic Metabolic Panel] : basic metabolic panel [Essential Hypertension] : essential hypertension [Responding to Treatment] : responding to treatment [None] : There are no changes in medication management [Patient] : the patient [de-identified] : Contacted EP who attempted to start ivradabine, but could not afford it [de-identified] : 100% bi-ventricular paced [FreeTextEntry1] : discussed need to follow up with pacemaker monitoring. He has disconnected monitoring device and missed recent EP appointment. He and daughter will call to reschedule [de-identified] : sharifa [de-identified] : dramatic response, 24 pound volume reduction 1 week [de-identified] : replaced furosemide with torsemide [de-identified] : Too many instances of failing to take medications, today he says it is because going to internist and instructed to not eat for blood tests [FreeTextEntry2] : and daughter

## 2022-08-09 NOTE — HISTORY OF PRESENT ILLNESS
[FreeTextEntry1] : Mr. 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 He was roughly 30 lbs above his last discharge weight and the trigger is not taking his medications for 3 months because he felt well and did not refill prescriptions. He was also found to have atrial flutter which was a new diagnosis. Successfully diuresed 40 lbs in hospital and underwent CRT-P upgrade from Micra due to high pacing burden. Had TE guided cardioversion and remained in sinus rhythm. Continues to feel well, now fully active, able to walk quickly up flights of stairs, walk good distances all without dyspnea and denies orthopnea. \par \par At a prior visit found serum potassium elevated and endocrine had instructed to increase spironolactone, but contacted him to remain unchanged and avoid high potassium sources in diet. Since then doing very well, active, no dyspnea, there is no orthopnea or paroxysmal nocturnal dyspnea. Walks regularly, maybe half mile to and from stores.\par \par Since last seen doing extremely well, no symptoms, walking outdoors for exercise. However, apixaban became too expensive (Medicare D "donut hole") and has not been taking.\par \par Recently weight up, not feeling well, no fever, saw internist, treated with antibiotic for pneumonia and feeling better. However at pacemaker interrogation Optivol indicated volume excess. There is no orthopnea or paroxysmal nocturnal dyspnea At last visit observed good response to diuretic, making large amount of urine and feeling better, however Optivol still elevated and had runs of NSVT. Developed several prominent lymph nodes and biopsy  planned, off apixaban. Biopsy accomplished, diagnosis seems to be follicular lymphoma. Resumed apixaban.\par \par Referred to EP to consider possible atrial tachycardia, flutter, but concluded was sinus tachycardia. He is short of breath on minimal exertion, but denies orthopnea or paroxysmal nocturnal dyspnea. One week ago switched diuretic to torsemide and dramatic response.

## 2022-08-09 NOTE — PHYSICAL EXAM
[Elevated JVD ____cm] : elevated JVD ~Vcm [Not Palpable] : not palpable [Normal Rate] : normal [Rhythm Regular] : regular [Normal S1] : normal S1 [Normal S2] : normal S2 [II] : a grade 2 [Holosystolic] : holosystolic [[place]] : the murmur was transmitted to the apex [2+] : left 2+ [1+] : left 1+ [0] : left 0 [Dullness ____] : dullness [unfilled] [Normal] : alert and oriented, normal memory [___ +] : bilateral [unfilled]U+ pretibial pitting edema [Right Carotid Bruit] : no bruit heard over the right carotid [Left Carotid Bruit] : no bruit heard over the left carotid [de-identified] : left infraclavicular pacemaker pocket

## 2022-08-09 NOTE — CARDIOLOGY SUMMARY
[LVEF ___%] : LVEF [unfilled]% [Mild] : mild LV dysfunction [Moderate] : moderate pulmonary hypertension [Enlarged] : enlarged LA size [Severe] : severe mitral regurgitation [___] : [unfilled] [de-identified] : 6/24/2021 sinus rhythm with bi-ventricular paced rhythm\par 12/23/2021 mild sinus tachycardia, P-sense, biventricular paced.\par 5/24/2022 sinus rhythm 64,  P-sense and biventricular paced.\par 6/13/2022 sinus tachycardia 108, P-sense and biventricular pace.\par 7/6/2022  probable atrial tachycardia with biventricular pacing 2:1, heart rate 108.\par 8/2/2022 sinus tachycardia and biventricular paced at 102. [de-identified] : 6/24/2021 mild to moderate global LV dysfunction with dilated and hypertrophied LV, left atrial enlargement, normal right heart with device wire, mild to moderate MR. Compared to 11/7/2019 hospital study pulmonary hypertension improved and all other findings unchanged.

## 2022-08-10 LAB
ANION GAP SERPL CALC-SCNC: 14 MMOL/L
BUN SERPL-MCNC: 24 MG/DL
CALCIUM SERPL-MCNC: 9.2 MG/DL
CHLORIDE SERPL-SCNC: 101 MMOL/L
CO2 SERPL-SCNC: 27 MMOL/L
CREAT SERPL-MCNC: 1.35 MG/DL
EGFR: 56 ML/MIN/1.73M2
GLUCOSE SERPL-MCNC: 138 MG/DL
POTASSIUM SERPL-SCNC: 3.7 MMOL/L
SODIUM SERPL-SCNC: 142 MMOL/L

## 2022-08-12 ENCOUNTER — NON-APPOINTMENT (OUTPATIENT)
Age: 72
End: 2022-08-12

## 2022-08-15 ENCOUNTER — NON-APPOINTMENT (OUTPATIENT)
Age: 72
End: 2022-08-15

## 2022-08-17 ENCOUNTER — APPOINTMENT (OUTPATIENT)
Dept: ELECTROPHYSIOLOGY | Facility: CLINIC | Age: 72
End: 2022-08-17

## 2022-08-17 ENCOUNTER — OUTPATIENT (OUTPATIENT)
Dept: OUTPATIENT SERVICES | Facility: HOSPITAL | Age: 72
LOS: 1 days | Discharge: HOME | End: 2022-08-17

## 2022-08-17 DIAGNOSIS — Z95.0 PRESENCE OF CARDIAC PACEMAKER: Chronic | ICD-10-CM

## 2022-08-17 DIAGNOSIS — C85.90 NON-HODGKIN LYMPHOMA, UNSPECIFIED, UNSPECIFIED SITE: Chronic | ICD-10-CM

## 2022-08-17 DIAGNOSIS — C85.90 NON-HODGKIN LYMPHOMA, UNSPECIFIED, UNSPECIFIED SITE: ICD-10-CM

## 2022-08-18 ENCOUNTER — APPOINTMENT (OUTPATIENT)
Dept: SURGICAL ONCOLOGY | Facility: CLINIC | Age: 72
End: 2022-08-18

## 2022-08-18 VITALS
HEART RATE: 112 BPM | WEIGHT: 176 LBS | DIASTOLIC BLOOD PRESSURE: 85 MMHG | SYSTOLIC BLOOD PRESSURE: 150 MMHG | TEMPERATURE: 98.3 F | OXYGEN SATURATION: 97 % | HEIGHT: 69 IN | RESPIRATION RATE: 16 BRPM | BODY MASS INDEX: 26.07 KG/M2

## 2022-08-18 PROCEDURE — 99203 OFFICE O/P NEW LOW 30 MIN: CPT

## 2022-08-20 ENCOUNTER — APPOINTMENT (OUTPATIENT)
Dept: ULTRASOUND IMAGING | Facility: CLINIC | Age: 72
End: 2022-08-20

## 2022-08-20 ENCOUNTER — LABORATORY RESULT (OUTPATIENT)
Age: 72
End: 2022-08-20

## 2022-08-20 ENCOUNTER — OUTPATIENT (OUTPATIENT)
Dept: OUTPATIENT SERVICES | Facility: HOSPITAL | Age: 72
LOS: 1 days | End: 2022-08-20
Payer: COMMERCIAL

## 2022-08-20 DIAGNOSIS — C82.20 FOLLICULAR LYMPHOMA GRADE III, UNSPECIFIED, UNSPECIFIED SITE: ICD-10-CM

## 2022-08-20 DIAGNOSIS — C85.90 NON-HODGKIN LYMPHOMA, UNSPECIFIED, UNSPECIFIED SITE: Chronic | ICD-10-CM

## 2022-08-20 DIAGNOSIS — R59.1 GENERALIZED ENLARGED LYMPH NODES: ICD-10-CM

## 2022-08-20 DIAGNOSIS — Z00.8 ENCOUNTER FOR OTHER GENERAL EXAMINATION: ICD-10-CM

## 2022-08-20 DIAGNOSIS — Z95.0 PRESENCE OF CARDIAC PACEMAKER: Chronic | ICD-10-CM

## 2022-08-20 PROCEDURE — 76536 US EXAM OF HEAD AND NECK: CPT | Mod: 26

## 2022-08-20 PROCEDURE — 76604 US EXAM CHEST: CPT | Mod: 26

## 2022-08-20 PROCEDURE — 76604 US EXAM CHEST: CPT

## 2022-08-20 PROCEDURE — 76536 US EXAM OF HEAD AND NECK: CPT

## 2022-08-21 ENCOUNTER — NON-APPOINTMENT (OUTPATIENT)
Age: 72
End: 2022-08-21

## 2022-08-22 ENCOUNTER — RESULT REVIEW (OUTPATIENT)
Age: 72
End: 2022-08-22

## 2022-08-22 ENCOUNTER — APPOINTMENT (OUTPATIENT)
Dept: HEMATOLOGY ONCOLOGY | Facility: CLINIC | Age: 72
End: 2022-08-22
Payer: MEDICARE

## 2022-08-22 VITALS
BODY MASS INDEX: 25.07 KG/M2 | RESPIRATION RATE: 16 BRPM | OXYGEN SATURATION: 98 % | HEART RATE: 95 BPM | WEIGHT: 169.75 LBS | TEMPERATURE: 97.1 F | DIASTOLIC BLOOD PRESSURE: 71 MMHG | SYSTOLIC BLOOD PRESSURE: 126 MMHG

## 2022-08-22 LAB
BASOPHILS # BLD AUTO: 0.05 K/UL — SIGNIFICANT CHANGE UP (ref 0–0.2)
BASOPHILS NFR BLD AUTO: 0.7 % — SIGNIFICANT CHANGE UP (ref 0–2)
EOSINOPHIL # BLD AUTO: 0.25 K/UL — SIGNIFICANT CHANGE UP (ref 0–0.5)
EOSINOPHIL NFR BLD AUTO: 3.3 % — SIGNIFICANT CHANGE UP (ref 0–6)
HCT VFR BLD CALC: 31.5 % — LOW (ref 39–50)
HGB BLD-MCNC: 9.7 G/DL — LOW (ref 13–17)
IMM GRANULOCYTES NFR BLD AUTO: 0.9 % — SIGNIFICANT CHANGE UP (ref 0–1.5)
LYMPHOCYTES # BLD AUTO: 0.46 K/UL — LOW (ref 1–3.3)
LYMPHOCYTES # BLD AUTO: 6 % — LOW (ref 13–44)
MCHC RBC-ENTMCNC: 24.3 PG — LOW (ref 27–34)
MCHC RBC-ENTMCNC: 30.8 G/DL — LOW (ref 32–36)
MCV RBC AUTO: 78.8 FL — LOW (ref 80–100)
MONOCYTES # BLD AUTO: 0.56 K/UL — SIGNIFICANT CHANGE UP (ref 0–0.9)
MONOCYTES NFR BLD AUTO: 7.3 % — SIGNIFICANT CHANGE UP (ref 2–14)
NEUTROPHILS # BLD AUTO: 6.24 K/UL — SIGNIFICANT CHANGE UP (ref 1.8–7.4)
NEUTROPHILS NFR BLD AUTO: 81.8 % — HIGH (ref 43–77)
NRBC # BLD: 0 /100 WBCS — SIGNIFICANT CHANGE UP (ref 0–0)
PLATELET # BLD AUTO: 179 K/UL — SIGNIFICANT CHANGE UP (ref 150–400)
RBC # BLD: 4 M/UL — LOW (ref 4.2–5.8)
RBC # FLD: 19.9 % — HIGH (ref 10.3–14.5)
WBC # BLD: 7.63 K/UL — SIGNIFICANT CHANGE UP (ref 3.8–10.5)
WBC # FLD AUTO: 7.63 K/UL — SIGNIFICANT CHANGE UP (ref 3.8–10.5)

## 2022-08-22 PROCEDURE — 99215 OFFICE O/P EST HI 40 MIN: CPT

## 2022-08-23 ENCOUNTER — APPOINTMENT (OUTPATIENT)
Dept: INTERNAL MEDICINE | Facility: CLINIC | Age: 72
End: 2022-08-23

## 2022-08-23 ENCOUNTER — RESULT REVIEW (OUTPATIENT)
Age: 72
End: 2022-08-23

## 2022-08-23 ENCOUNTER — APPOINTMENT (OUTPATIENT)
Dept: INFUSION THERAPY | Facility: HOSPITAL | Age: 72
End: 2022-08-23

## 2022-08-23 LAB — URATE SERPL-MCNC: 13.7 MG/DL — HIGH (ref 3.4–8.8)

## 2022-08-24 DIAGNOSIS — C85.10 UNSPECIFIED B-CELL LYMPHOMA, UNSPECIFIED SITE: ICD-10-CM

## 2022-08-25 NOTE — ASSESSMENT
[FreeTextEntry1] : Will schedule for surgical biopsy of left upper back or left cervical node for definitive diagnosis to direct care.\par All questions answered.

## 2022-08-25 NOTE — REASON FOR VISIT
[Initial Consultation] : an initial consultation for [Family Member] : family member [FreeTextEntry2] : lymphoma, adenopathy, back mass

## 2022-08-25 NOTE — PHYSICAL EXAM
[FreeTextEntry1] : Palpable fixed adenopathy of the left cervical chain.\par Left upper back paraspinal mass, firm.

## 2022-08-25 NOTE — HISTORY OF PRESENT ILLNESS
[de-identified] : Mr. Skaggs is a 71 y/o male with a history NHL treated in 2002.  He developed DLBL treated in 2010.\par Recently noted to have increasing cervical adenopathy and weight loss without other symptoms of night sweats, fatigue, fever.\par Ultrasound biopsy of left axillary and cervical lymph node 06/14/2022 demonstrated B-cell lymphoma with follicular center origin.\par Subsequent PET/CT scan 08/06/2022 showed FDG avidity in the left cervical, bilateral supraclavicular and intramuscular masses in the left posterior chest wall.\par Highest SUV noted in left cervical level 2B node, 2.5 x 1.1 cm/SUV 20.1 and posterior chest wall intramuscular mass, 3.1 x 4.3 cm/SUV 20.3.\par He is referred for surgical biopsy for definitive diagnosis and to evaluate for more aggressive disease.

## 2022-08-26 ENCOUNTER — INPATIENT (INPATIENT)
Facility: HOSPITAL | Age: 72
LOS: 16 days | Discharge: HOME | DRG: 808 | End: 2022-09-12
Attending: INTERNAL MEDICINE | Admitting: HOSPITALIST
Payer: COMMERCIAL

## 2022-08-26 ENCOUNTER — APPOINTMENT (OUTPATIENT)
Dept: INFUSION THERAPY | Facility: HOSPITAL | Age: 72
End: 2022-08-26

## 2022-08-26 VITALS
TEMPERATURE: 97 F | HEART RATE: 90 BPM | RESPIRATION RATE: 18 BRPM | SYSTOLIC BLOOD PRESSURE: 94 MMHG | HEIGHT: 72.25 IN | WEIGHT: 169.09 LBS | DIASTOLIC BLOOD PRESSURE: 51 MMHG | OXYGEN SATURATION: 96 %

## 2022-08-26 DIAGNOSIS — Z95.0 PRESENCE OF CARDIAC PACEMAKER: Chronic | ICD-10-CM

## 2022-08-26 DIAGNOSIS — C85.90 NON-HODGKIN LYMPHOMA, UNSPECIFIED, UNSPECIFIED SITE: Chronic | ICD-10-CM

## 2022-08-26 LAB
ALBUMIN SERPL ELPH-MCNC: 4.1 G/DL — SIGNIFICANT CHANGE UP (ref 3.3–5)
ALP SERPL-CCNC: 138 U/L — HIGH (ref 40–120)
ALT FLD-CCNC: 18 U/L — SIGNIFICANT CHANGE UP (ref 10–45)
ANION GAP SERPL CALC-SCNC: 20 MMOL/L — HIGH (ref 5–17)
ANISOCYTOSIS BLD QL: SLIGHT — SIGNIFICANT CHANGE UP
AST SERPL-CCNC: 52 U/L — HIGH (ref 10–40)
BASE EXCESS BLDV CALC-SCNC: 4.9 MMOL/L — HIGH (ref -2–3)
BASOPHILS # BLD AUTO: 0 K/UL — SIGNIFICANT CHANGE UP (ref 0–0.2)
BASOPHILS NFR BLD AUTO: 0 % — SIGNIFICANT CHANGE UP (ref 0–2)
BILIRUB SERPL-MCNC: 5.6 MG/DL — HIGH (ref 0.2–1.2)
BLD GP AB SCN SERPL QL: NEGATIVE — SIGNIFICANT CHANGE UP
BUN SERPL-MCNC: 103 MG/DL — HIGH (ref 7–23)
CA-I SERPL-SCNC: 1.2 MMOL/L — SIGNIFICANT CHANGE UP (ref 1.15–1.33)
CALCIUM SERPL-MCNC: 10.2 MG/DL — SIGNIFICANT CHANGE UP (ref 8.4–10.5)
CHLORIDE BLDV-SCNC: 101 MMOL/L — SIGNIFICANT CHANGE UP (ref 96–108)
CHLORIDE SERPL-SCNC: 97 MMOL/L — SIGNIFICANT CHANGE UP (ref 96–108)
CO2 BLDV-SCNC: 30 MMOL/L — HIGH (ref 22–26)
CO2 SERPL-SCNC: 25 MMOL/L — SIGNIFICANT CHANGE UP (ref 22–31)
CREAT SERPL-MCNC: 1.92 MG/DL — HIGH (ref 0.5–1.3)
EGFR: 37 ML/MIN/1.73M2 — LOW
EOSINOPHIL # BLD AUTO: 0 K/UL — SIGNIFICANT CHANGE UP (ref 0–0.5)
EOSINOPHIL NFR BLD AUTO: 0 % — SIGNIFICANT CHANGE UP (ref 0–6)
FLUAV AG NPH QL: SIGNIFICANT CHANGE UP
FLUBV AG NPH QL: SIGNIFICANT CHANGE UP
G6PD RBC-CCNC: 4.8 U/G HGB — LOW (ref 7–20.5)
GAS PNL BLDV: 138 MMOL/L — SIGNIFICANT CHANGE UP (ref 136–145)
GAS PNL BLDV: SIGNIFICANT CHANGE UP
GAS PNL BLDV: SIGNIFICANT CHANGE UP
GLUCOSE BLDV-MCNC: 146 MG/DL — HIGH (ref 70–99)
GLUCOSE SERPL-MCNC: 190 MG/DL — HIGH (ref 70–99)
HCO3 BLDV-SCNC: 29 MMOL/L — SIGNIFICANT CHANGE UP (ref 22–29)
HCT VFR BLD CALC: 19.4 % — CRITICAL LOW (ref 39–50)
HCT VFR BLD CALC: 20.2 % — CRITICAL LOW (ref 39–50)
HCT VFR BLDA CALC: 20 % — CRITICAL LOW (ref 39–51)
HGB BLD CALC-MCNC: 5.5 G/DL — CRITICAL LOW (ref 12.6–17.4)
HGB BLD CALC-MCNC: 6.5 G/DL — CRITICAL LOW (ref 12.6–17.4)
HGB BLD-MCNC: 5.7 G/DL — CRITICAL LOW (ref 13–17)
HGB BLD-MCNC: 6 G/DL — CRITICAL LOW (ref 13–17)
HYPOCHROMIA BLD QL: SLIGHT — SIGNIFICANT CHANGE UP
LACTATE BLDV-MCNC: 3.5 MMOL/L — HIGH (ref 0.5–2)
LDH SERPL L TO P-CCNC: 1161 U/L — HIGH (ref 50–242)
LIDOCAIN IGE QN: 30 U/L — SIGNIFICANT CHANGE UP (ref 7–60)
LYMPHOCYTES # BLD AUTO: 0.2 K/UL — LOW (ref 1–3.3)
LYMPHOCYTES # BLD AUTO: 1.8 % — LOW (ref 13–44)
MANUAL SMEAR VERIFICATION: SIGNIFICANT CHANGE UP
MCHC RBC-ENTMCNC: 25.1 PG — LOW (ref 27–34)
MCHC RBC-ENTMCNC: 25.1 PG — LOW (ref 27–34)
MCHC RBC-ENTMCNC: 29.4 GM/DL — LOW (ref 32–36)
MCHC RBC-ENTMCNC: 29.7 GM/DL — LOW (ref 32–36)
MCV RBC AUTO: 84.5 FL — SIGNIFICANT CHANGE UP (ref 80–100)
MCV RBC AUTO: 85.5 FL — SIGNIFICANT CHANGE UP (ref 80–100)
METHGB MFR BLDV: 0.2 % — SIGNIFICANT CHANGE UP (ref 0–1.5)
MICROCYTES BLD QL: SLIGHT — SIGNIFICANT CHANGE UP
MONOCYTES # BLD AUTO: 0.19 K/UL — SIGNIFICANT CHANGE UP (ref 0–0.9)
MONOCYTES NFR BLD AUTO: 1.7 % — LOW (ref 2–14)
NEUTROPHILS # BLD AUTO: 10.72 K/UL — HIGH (ref 1.8–7.4)
NEUTROPHILS NFR BLD AUTO: 96.5 % — HIGH (ref 43–77)
NT-PROBNP SERPL-SCNC: HIGH PG/ML (ref 0–300)
OB PNL STL: NEGATIVE — SIGNIFICANT CHANGE UP
OVALOCYTES BLD QL SMEAR: SLIGHT — SIGNIFICANT CHANGE UP
PCO2 BLDV: 41 MMHG — LOW (ref 42–55)
PH BLDV: 7.46 — HIGH (ref 7.32–7.43)
PLAT MORPH BLD: NORMAL — SIGNIFICANT CHANGE UP
PLATELET # BLD AUTO: 236 K/UL — SIGNIFICANT CHANGE UP (ref 150–400)
PLATELET # BLD AUTO: 257 K/UL — SIGNIFICANT CHANGE UP (ref 150–400)
PO2 BLDV: 28 MMHG — SIGNIFICANT CHANGE UP (ref 25–45)
POIKILOCYTOSIS BLD QL AUTO: SLIGHT — SIGNIFICANT CHANGE UP
POTASSIUM BLDV-SCNC: 3.8 MMOL/L — SIGNIFICANT CHANGE UP (ref 3.5–5.1)
POTASSIUM SERPL-MCNC: 4.3 MMOL/L — SIGNIFICANT CHANGE UP (ref 3.5–5.3)
POTASSIUM SERPL-SCNC: 4.3 MMOL/L — SIGNIFICANT CHANGE UP (ref 3.5–5.3)
PROT SERPL-MCNC: 8.4 G/DL — HIGH (ref 6–8.3)
RBC # BLD: 2.27 M/UL — LOW (ref 4.2–5.8)
RBC # BLD: 2.39 M/UL — LOW (ref 4.2–5.8)
RBC # FLD: 19.6 % — HIGH (ref 10.3–14.5)
RBC # FLD: 20.7 % — HIGH (ref 10.3–14.5)
RBC BLD AUTO: ABNORMAL
RH IG SCN BLD-IMP: POSITIVE — SIGNIFICANT CHANGE UP
RSV RNA NPH QL NAA+NON-PROBE: SIGNIFICANT CHANGE UP
SAO2 % BLDV: 34.9 % — LOW (ref 67–88)
SARS-COV-2 RNA SPEC QL NAA+PROBE: SIGNIFICANT CHANGE UP
SODIUM SERPL-SCNC: 142 MMOL/L — SIGNIFICANT CHANGE UP (ref 135–145)
URATE SERPL-MCNC: 6.4 MG/DL — SIGNIFICANT CHANGE UP (ref 3.4–8.8)
WBC # BLD: 11.11 K/UL — HIGH (ref 3.8–10.5)
WBC # BLD: 9.52 K/UL — SIGNIFICANT CHANGE UP (ref 3.8–10.5)
WBC # FLD AUTO: 11.11 K/UL — HIGH (ref 3.8–10.5)
WBC # FLD AUTO: 9.52 K/UL — SIGNIFICANT CHANGE UP (ref 3.8–10.5)

## 2022-08-26 PROCEDURE — 71045 X-RAY EXAM CHEST 1 VIEW: CPT | Mod: 26,76

## 2022-08-26 PROCEDURE — 99291 CRITICAL CARE FIRST HOUR: CPT | Mod: 25

## 2022-08-26 PROCEDURE — 74177 CT ABD & PELVIS W/CONTRAST: CPT | Mod: 26,MA

## 2022-08-26 PROCEDURE — 93010 ELECTROCARDIOGRAM REPORT: CPT | Mod: 76

## 2022-08-26 RX ORDER — SODIUM CHLORIDE 9 MG/ML
500 INJECTION INTRAMUSCULAR; INTRAVENOUS; SUBCUTANEOUS ONCE
Refills: 0 | Status: COMPLETED | OUTPATIENT
Start: 2022-08-26 | End: 2022-08-26

## 2022-08-26 RX ORDER — ALBUMIN HUMAN 25 %
250 VIAL (ML) INTRAVENOUS ONCE
Refills: 0 | Status: COMPLETED | OUTPATIENT
Start: 2022-08-26 | End: 2022-08-26

## 2022-08-26 RX ORDER — MAGNESIUM SULFATE 500 MG/ML
1 VIAL (ML) INJECTION ONCE
Refills: 0 | Status: COMPLETED | OUTPATIENT
Start: 2022-08-26 | End: 2022-08-26

## 2022-08-26 RX ORDER — ALLOPURINOL 300 MG
300 TABLET ORAL ONCE
Refills: 0 | Status: COMPLETED | OUTPATIENT
Start: 2022-08-26 | End: 2022-08-26

## 2022-08-26 RX ADMIN — Medication 100 GRAM(S): at 18:42

## 2022-08-26 RX ADMIN — Medication 300 MILLIGRAM(S): at 18:43

## 2022-08-26 RX ADMIN — Medication 125 MILLILITER(S): at 21:34

## 2022-08-26 RX ADMIN — Medication 1 GRAM(S): at 21:26

## 2022-08-26 RX ADMIN — SODIUM CHLORIDE 500 MILLILITER(S): 9 INJECTION INTRAMUSCULAR; INTRAVENOUS; SUBCUTANEOUS at 16:57

## 2022-08-26 NOTE — ED ADULT NURSE NOTE - OBJECTIVE STATEMENT
71YO male with PMH of HTN, DM, HF-Eliquis via walk in presenting with complaints of  Jaundice. Pt stated yesterday having n/v-emesis green, yellow skin color presentation, states this morning being more yellow with an episode of diarrhea-states blood upon wiping--dark red. Feels a bit weak. Pt Axox4, respirations even, & non-labored. Radial pulses strong and equal bilaterally. Abdomen soft, non-tender, non-distended. Skin warm, dry, and intact. Pt placed in position of comfort. Pt denies chest pain, palpitations, shortness of breath, fever, chills, or diaphoresis. Pt educated on call bell system and provided call bell. Bed in lowest position, wheels locked, appropriate side rails raised. Pt denies needs at this time.

## 2022-08-26 NOTE — CONSULT NOTE ADULT - ATTENDING COMMENTS
72M Hx HTN, T2DM, HFrEF 45%, Gout, CKD2, CHB s/p PPM, DLBCL Relapse s/p CXT, TLS treated with Rasburicase 8/23 at [facility] p/w ED N/V, Acute Anemia Hb 5.7, low G6PD level 4.8 and Hypotension.   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 I have read and agreeable in general with resident's Documented Note, Assessment and Management Plan which reflected my opinions from bedside round and discussion.

## 2022-08-26 NOTE — CONSULT NOTE ADULT - ASSESSMENT
72M pmhx DLBCL (RCHOP 2003, relapsed 2009), HFrEF (11/2019 EF 45%), CHB sp pacemaker, HTN, DM, aflutter on eliquis, presenting with fatigue, jaundice, nausea, vomiting following rasburicase administration for TLS 8/23 found to be in [id number] autoimmune hemolytic reaction and hypotensive.     #G6PD induced acute hemolytic reaction   -apparent hgb baseline 9.9-11.0 May-June 2022, most recently 9.7 August 2022  -Administered rasburicase 3mg 8/23 for TLS, uric acid 13.7 previous   -LDH 1161, bilirubin 5.6  -active type and screen  -Recommend transfuse 2 units irradiated pRBC, do not need to be given over 3 hours OK to transfuse faster     #Hypotension  -pRBC transfusion as above  -5% albumin 250cc     #DLBCL with relapse   -reccomend admission to hematology oncology floor for further management.     Not a candidate for MICU at this time. Reconsult PRN.  72M pmhx DLBCL (RCHOP 2003, relapsed 2009), HFrEF (11/2019 EF 45%), CHB sp pacemaker, HTN, DM, aflutter on eliquis, presenting with fatigue, jaundice, nausea, vomiting following rasburicase administration for TLS 8/23 found to be in [id number]  hemolytic reaction and hypotensive.     #G6PD induced  hemolytic reaction   -apparent hgb baseline 9.9-11.0 May-June 2022, most recently 9.7 August 2022  -Administered rasburicase 3mg 8/23 for TLS, uric acid 13.7 previous   -LDH 1161, bilirubin 5.6  -active type and screen  -Recommend transfuse 2 units irradiated pRBC, do not need to be given over 3 hours OK to transfuse faster     #Hypotension  -pRBC transfusion as above  -5% albumin 250cc     #DLBCL with relapse   -reccomend admission to hematology oncology floor for further management.     Not a candidate for MICU at this time. Reconsult PRN.

## 2022-08-26 NOTE — ED PROVIDER NOTE - NS ED ROS FT
GENERAL: no fever, no chills, no weight loss  EYES: no change in vision, no irritation, no discharge, no redness, no pain  HEENT: no trouble swallowing or speaking, no throat pain, no ear pain  CARDIAC: no chest pain, no palpitations   PULMONARY: no cough, no shortness of breath, no wheezing  GI: no abdominal pain, +nausea, + vomiting, no diarrhea, no constipation, no melena, no hematochezia, no hematemesis  : no changes in urination, no dysuria, no frequency, no hematuria, no discharge  SKIN: +jaundice, no rashes  NEURO: no headache, no numbness, no weakness  MSK: no joint pain, no muscle pain, no back pain, no calf pain

## 2022-08-26 NOTE — CONSULT NOTE ADULT - CONSULT REASON
Hypotension in setting of G6PD induced autoimmune hemolytic reaction Hypotension in setting of G6PD induced hemolytic reaction

## 2022-08-26 NOTE — ED PROVIDER NOTE - NSICDXPASTMEDICALHX_GEN_ALL_CORE_FT
PAST MEDICAL HISTORY:  Atrial flutter, unspecified type     DM type 2 (diabetes mellitus, type 2) Never on insulin    Essential hypertension     Impaired memory     Moderate mitral regurgitation     Non-Hodgkins Lymphoma In [facility] for > 10 years    Pleural effusion     Rhinitis, allergic     Systolic heart failure

## 2022-08-26 NOTE — ED PROVIDER NOTE - CLINICAL SUMMARY MEDICAL DECISION MAKING FREE TEXT BOX
73 yo being treated for lymphoma with evidence of TLS exacerbated by rasburicase - patient jaundiced on appearance with n/v, diarrhea with blood per rectum. Will draw hemolysis labs, obtain CT AP given n/v, monitor vitals and obtain heme-onc recs. Will assess hgb levels and obtain fobt given rectal bleeding.

## 2022-08-26 NOTE — ED PROVIDER NOTE - ATTENDING CONTRIBUTION TO CARE
I, Randee Schulz, performed a history and physical exam of the patient and discussed their management with the resident and /or advanced care provider. I reviewed the resident and /or ACP's note and agree with the documented findings and plan of care except where otherwise noted in my note. I was present and available for all procedures.     71 yo lymphoma tx at [facility] with being treated with tumor lysis rasbiuicase, hfref, htn, dm, aflutter on eliquis, cad, heart block, ckd p/w jaundice and fatigue and multiple episodes of bilious vomiting since yesterday. Infusion on 8/23. Also with diarrhea, dark red blood with wiping. No abdominal pain. No chest pain, no sob. no hx abdominal surgeries    Oncologist: Dr. Holly Verduzco PCP    Meds: Torsemide 100 mg PO qd, KCl, Eliquis 5 mg BID, lisinopril 40 mg, atorvastatin, spironolactone, carvedilol 25 mg BID    PHYSICAL EXAM:   General: thin, jaundiced, chronically ill appearing  HEENT: NC/AT, dry mucous membranes, airway patent  Cardiovascular: regular rate and rhythm, + S1/S2, no murmurs, rubs, gallops appreciated  Respiratory: clear to auscultation bilaterally, good aeration bilaterally, nonlabored respirations  Abdominal: soft, nontender, nondistended, no rebound, guarding or rigidity  Back: no costovertebral tenderness, no midline spinal tenderness  Extremities: minimal LE edema b/l.   Neuro: awake and alert  Psychiatric: appropriate mood and affect.   Skin: jaundiced  -Randee Schulz MD Attending Physician     concern for hemolysis vs tumor lysis syndrome vs side effect from rasbiricase. also eval for obstructive pathology in abdomen although less likely without pain. labs, ct, gentle ivf hydration given hfref, heme consult, admission

## 2022-08-26 NOTE — ED PROVIDER NOTE - OBJECTIVE STATEMENT
71 yo M [facility] patient  PMH of HTN, T2DM, HFrEF with EF ~45%, chronic gout, complete heart block s/p PPM, non-Hodgkin lymphoma s/p chemo in 2004 (currently in remission), CKD stage II presents with jaundice x 1 day. Patient had evidence of tumor lysis syndrome and was treated with rasburicase at [facility] on Tuesday, yesterday started to have yellowing of skin, nausea ,vomiting. As per [facility] records and labs, patient is G6PD deficient. Patient has had diarrhea since this morning wit hsome red blood with wiping. No abd pain, changes in urination, bowel movements, fevers, chills.

## 2022-08-26 NOTE — ED PROVIDER NOTE - PHYSICAL EXAMINATION
GENERAL: no acute distress, non-toxic appearing  HEAD: normocephalic, atraumatic  HEENT: normal conjunctiva, oral mucosa moist, neck supple  CARDIAC: regular rate and rhythm, normal S1 and S2,  no appreciable murmurs  PULM: clear to ascultation bilaterally, no crackles, rales, rhonchi, or wheezing  GI: abdomen nondistended, soft, nontender, no guarding or rebound tenderness  : no CVA tenderness, no suprapubic tenderness  NEURO: alert and oriented x 3, normal speech, moving all extremities   MSK: no visible deformities, no peripheral edema, calf tenderness/redness/swelling  SKIN: no visible rashes, dry, well-perfused  PSYCH: appropriate mood and affect

## 2022-08-26 NOTE — ED PROVIDER NOTE - PROGRESS NOTE DETAILS
Randee Schulz MD Attending Physician- patient hypotensive to 88 systolic, MAP high 50s. Went to reassess patient, sitting up in stretcher acutely SOB. Bedside POCUS with anterior B lines, unable to visualize IVC. no worsening of LE edema. stat portable xray called. concern for fluid overload but patient also require transfusion. MICU consulted, may start BiPAP. Most recent BP 90/52 MAP 65 QTc 690 - patient on pads, currently NSR    Madai Oneal MD, PGY2 Rectal exam with good tone and brown stool on fobt    Madai Oneal MD, PGY2 Randee Schulz MD Attending Physician- patient seen by micu, recommending albumin and prbc to be transfused over 1 hour. sob resolved Sergei Vaughn, DO PGY-2: POCUS showing no pericardial effusion. Randee Schulz MD Attending Physician- sob recurred, began prior to prbc trasnfusion so low suspicion of reaction to blood products. still feeling sob but feels improved on bipap. no desaturations. repeat labs ordered. micu reconsulted Randee Schulz MD Attending Physician-  prbc transfusion slowed, patient still feeling sob, tripoding saturating well, no LE edema, I increased bipap settings to 12/6. micu aware, repeat labs pending. ED Sign Out, pending admission for symptomatic anemia, complications lymphoma, on BiPap, MICU consulting -- Jason Evangelista MD Randee Schulz MD Attending Physician- patient seen by micu, critically ill, recommending albumin and prbc to be transfused over 1 hour. sob resolved Randee Schulz MD Attending Physician-  prbc transfusion slowed, patient still feeling sob, tripoding saturating well, no LE edema, I increased bipap settings to 12/6. micu aware, repeat labs pending. while uric acid is normal, patient is likely hemolyzing 2/2 rasbiricase that was given as part of outpatient treatment for TLS. does not currently appear to be in tumor lysis syndrome. however, want to be cautious with diuresis given recent TLS hx.

## 2022-08-26 NOTE — CONSULT NOTE ADULT - SUBJECTIVE AND OBJECTIVE BOX
CHIEF COMPLAINT: Hypotension and  automimmune hemolytic anemia    HPI:     73 yo M [facility] patient  PMH of HTN, T2DM, HFrEF with EF ~45%, chronic gout, complete heart block s/p PPM, non-Hodgkin lymphoma s/p chemo in 2004 (currently in remission), CKD stage II presents with jaundice x 1 day. Patient had evidence of tumor lysis syndrome and was treated with rasburicase at [facility] on Tuesday, yesterday started to have yellowing of skin, nausea ,vomiting. As per [facility] records and labs, patient is G6PD deficient. Patient has had diarrhea since this morning wit hsome red blood with wiping. No abd pain, changes in urination, bowel movements, fevers, chills.    MICU consulted for hypotension. MAP noted to be 61 on encounter. Received 500cc fluid bolus pending further resuscitation and pRBC transfusion. Previously with dyspnea in ED now denies. no easy bleeding or bruising. denies falls at home. notes fatigue and weakness over past days and overall weight loss. History obtained from patient limited and collateral obtained from ED.     PAST MEDICAL & SURGICAL HISTORY:  Non-Hodgkins Lymphoma  In [facility] for &gt; 10 years      DM type 2 (diabetes mellitus, type 2)  Never on insulin      Essential hypertension      Rhinitis, allergic      Systolic heart failure      Moderate mitral regurgitation      Pleural effusion      Atrial flutter, unspecified type      Impaired memory      Non-Hodgkin lymphoma  h/o axillary dissection      Cardiac pacemaker          FAMILY HISTORY:  Family history of CHF (congestive heart failure)  Mother    Family history of non-Hodgkin&#x27;s lymphoma (Sibling)        SOCIAL HISTORY:  Smoking: [ ] Never Smoked [ ] Former Smoker (__ packs x ___ years) [ ] Current Smoker  (__ packs x ___ years)  Substance Use: [ ] Never Used [ ] Used ____  EtOH Use:  Marital Status: [ ] Single [ ]  [ ]  [ ]   Sexual History:   Occupation:  Recent Travel:  Country of Birth:  Advance Directives:    Allergies    No Known Allergies    Intolerances        HOME MEDICATIONS:    REVIEW OF SYSTEMS:  Const no fevers chills sweats  CV no chest pain or palpitations  Pulm denies dyspnea at time of MICU consult  GI no abdominal pain  Ext denies swelling of extremities.     OBJECTIVE:  ICU Vital Signs Last 24 Hrs  T(C): 36.5 (26 Aug 2022 18:29), Max: 36.5 (26 Aug 2022 18:29)  T(F): 97.7 (26 Aug 2022 18:29), Max: 97.7 (26 Aug 2022 18:29)  HR: 100 (26 Aug 2022 20:23) (90 - 100)  BP: 97/47 (26 Aug 2022 20:23) (85/36 - 97/47)  BP(mean): 62 (26 Aug 2022 20:23) (50 - 65)  ABP: --  ABP(mean): --  RR: 16 (26 Aug 2022 20:23) (16 - 28)  SpO2: 98% (26 Aug 2022 20:23) (96% - 100%)    O2 Parameters below as of 26 Aug 2022 20:23  Patient On (Oxygen Delivery Method): room air              CAPILLARY BLOOD GLUCOSE          PHYSICAL EXAM:  General: jaundiced and fatigued appeared  HEENT: dry mucus membranes  Respiratory: CTAB  Cardiovascular: RRR no m/r/g  Abdomen: soft nondistended and nontender to palpation  Extremities: no edema  Skin: jaundiced  Neurological: AOx2.     LINES:   2 PIV Left upper extremity     HOSPITAL MEDICATIONS:                    albumin human  5% IVPB 250 milliLiter(s) IV Intermittent Once            LABS:                        5.7    11.11 )-----------( 257      ( 26 Aug 2022 17:39 )             19.4     Hgb Trend: 5.7<--, 9.7<--  08-26    142  |  97  |  103<H>  ----------------------------<  190<H>  4.3   |  25  |  1.92<H>    Ca    10.2      26 Aug 2022 17:39  Mg     1.8     08-26    TPro  8.4<H>  /  Alb  4.1  /  TBili  5.6<H>  /  DBili  x   /  AST  52<H>  /  ALT  18  /  AlkPhos  138<H>  08-26    Creatinine Trend: 1.92<--            MICROBIOLOGY:     RADIOLOGY:  [ ] Reviewed and interpreted by me    EKG:           CHIEF COMPLAINT: Hypotension and  drug induced hemolytic anemia    HPI:     73 yo M [facility] patient  PMH of HTN, T2DM, HFrEF with EF ~45%, chronic gout, complete heart block s/p PPM, non-Hodgkin lymphoma s/p chemo in 2004 (currently in remission), CKD stage II presents with jaundice x 1 day. Patient had evidence of tumor lysis syndrome and was treated with rasburicase at [facility] on Tuesday, yesterday started to have yellowing of skin, nausea ,vomiting. As per [facility] records and labs, patient is G6PD deficient. Patient has had diarrhea since this morning wit hsome red blood with wiping. No abd pain, changes in urination, bowel movements, fevers, chills.    MICU consulted for hypotension. MAP noted to be 61 on encounter. Received 500cc fluid bolus pending further resuscitation and pRBC transfusion. Previously with dyspnea in ED now denies. no easy bleeding or bruising. denies falls at home. notes fatigue and weakness over past days and overall weight loss. History obtained from patient limited and collateral obtained from ED.     PAST MEDICAL & SURGICAL HISTORY:  Non-Hodgkins Lymphoma  In [facility] for &gt; 10 years      DM type 2 (diabetes mellitus, type 2)  Never on insulin      Essential hypertension      Rhinitis, allergic      Systolic heart failure      Moderate mitral regurgitation      Pleural effusion      Atrial flutter, unspecified type      Impaired memory      Non-Hodgkin lymphoma  h/o axillary dissection      Cardiac pacemaker          FAMILY HISTORY:  Family history of CHF (congestive heart failure)  Mother    Family history of non-Hodgkin&#x27;s lymphoma (Sibling)        SOCIAL HISTORY:  Smoking: [ ] Never Smoked [ ] Former Smoker (__ packs x ___ years) [ ] Current Smoker  (__ packs x ___ years)  Substance Use: [ ] Never Used [ ] Used ____  EtOH Use:  Marital Status: [ ] Single [ ]  [ ]  [ ]   Sexual History:   Occupation:  Recent Travel:  Country of Birth:  Advance Directives:    Allergies    No Known Allergies    Intolerances        HOME MEDICATIONS:    REVIEW OF SYSTEMS:  Const no fevers chills sweats  CV no chest pain or palpitations  Pulm denies dyspnea at time of MICU consult  GI no abdominal pain  Ext denies swelling of extremities.     OBJECTIVE:  ICU Vital Signs Last 24 Hrs  T(C): 36.5 (26 Aug 2022 18:29), Max: 36.5 (26 Aug 2022 18:29)  T(F): 97.7 (26 Aug 2022 18:29), Max: 97.7 (26 Aug 2022 18:29)  HR: 100 (26 Aug 2022 20:23) (90 - 100)  BP: 97/47 (26 Aug 2022 20:23) (85/36 - 97/47)  BP(mean): 62 (26 Aug 2022 20:23) (50 - 65)  ABP: --  ABP(mean): --  RR: 16 (26 Aug 2022 20:23) (16 - 28)  SpO2: 98% (26 Aug 2022 20:23) (96% - 100%)    O2 Parameters below as of 26 Aug 2022 20:23  Patient On (Oxygen Delivery Method): room air              CAPILLARY BLOOD GLUCOSE          PHYSICAL EXAM:  General: jaundiced and fatigued appeared  HEENT: dry mucus membranes  Respiratory: CTAB  Cardiovascular: RRR no m/r/g  Abdomen: soft nondistended and nontender to palpation  Extremities: no edema  Skin: jaundiced  Neurological: AOx2.     LINES:   2 PIV Left upper extremity     HOSPITAL MEDICATIONS:                    albumin human  5% IVPB 250 milliLiter(s) IV Intermittent Once            LABS:                        5.7    11.11 )-----------( 257      ( 26 Aug 2022 17:39 )             19.4     Hgb Trend: 5.7<--, 9.7<--  08-26    142  |  97  |  103<H>  ----------------------------<  190<H>  4.3   |  25  |  1.92<H>    Ca    10.2      26 Aug 2022 17:39  Mg     1.8     08-26    TPro  8.4<H>  /  Alb  4.1  /  TBili  5.6<H>  /  DBili  x   /  AST  52<H>  /  ALT  18  /  AlkPhos  138<H>  08-26    Creatinine Trend: 1.92<--            MICROBIOLOGY:     RADIOLOGY:  [ ] Reviewed and interpreted by me    EKG:

## 2022-08-26 NOTE — CHART NOTE - NSCHARTNOTEFT_GEN_A_CORE
72 year old male with significant history of DLBCL (tx with R-CHOP in 2003, with relapse in 2009 treated with BR) now with multiple areas of palpable lymphadenopathy with biopsies shown to be at least grade IIIA follicular lymphoma. Has had significant weight loss >20lbs in the alst 6 months. Also noted to have an IgG lambda, IgG Kappa, and IgM Lambda monoclonal gammopathies. PET/CT 8/2022 with FDG avid cervical, b/l supraclavicular, chest and a few abdominal LNs, intramuscular masses in the left posterior chest and left uper thigh, scattered FDG avid subcutaneous soft tissue nodules with trase right pleural effusion and moderate left pleural effusion with a loculated fluid in the RML, splenomegaly. s/p biopsy of both cervical and left axillary LNs which showed grade 3A follicular lymphoma positive for BCL6 (3q27) breakpoint translocation and negative for MYC Rearrangement or BCL2-IGH gene rearrangement [translocation t(14;18) present in ~ 85% of FL]. There were areas of > 20 SUV on the PET-CT, repeat whole ymph node biopsy was requested to confirm suspected diagnosis of Grade 3B FL or transformed large cell lymphoma however patient presented with evidence of TLS and now with nausea/fatigue sent to the hospital for further evaluation. Prior to being sent to the ED his labs were suggestive of TLS with UA 13.7 s/p 3mg rasburicase on 8/23.     - outpatient had evidence of TLS with labs outpatient UA 13.7   - G6PD resulted 4.8  - likely has rasburicase related hemolysis, would transfuse to keep Hb >8   - start allopurinol 300mg daily  - IVF   - if nontoxic appearing and otherwise stable plan for steroids (100mg prednisone daily x5 days along with inpatient chemotherapy with O-COEP. Possible plan to start steroids over the weekends however will defer to hematology consult team on 8/27 once formally assessed.     Chintan Mccain MD   Hematology/Oncology Fellow   pager [phone]  After 5pm and on weekends page on call fellow 72 year old male with significant history of DLBCL (tx with R-CHOP in 2003, with relapse in 2009 treated with BR) now with multiple areas of palpable lymphadenopathy with biopsies shown to be at least grade IIIA follicular lymphoma. Has had significant weight loss >20lbs in the alst 6 months. Also noted to have an IgG lambda, IgG Kappa, and IgM Lambda monoclonal gammopathies. PET/CT 8/2022 with FDG avid cervical, b/l supraclavicular, chest and a few abdominal LNs, intramuscular masses in the left posterior chest and left uper thigh, scattered FDG avid subcutaneous soft tissue nodules with trase right pleural effusion and moderate left pleural effusion with a loculated fluid in the RML, splenomegaly. s/p biopsy of both cervical and left axillary LNs which showed grade 3A follicular lymphoma positive for BCL6 (3q27) breakpoint translocation and negative for MYC Rearrangement or BCL2-IGH gene rearrangement [translocation t(14;18) present in ~ 85% of FL]. There were areas of > 20 SUV on the PET-CT, repeat whole ymph node biopsy was requested to confirm suspected diagnosis of Grade 3B FL or transformed large cell lymphoma however patient presented with evidence of TLS and now with nausea/fatigue sent to the hospital for further evaluation. Prior to being sent to the ED his labs were suggestive of TLS with UA 13.7 s/p 3mg rasburicase on 8/23.     - outpatient had evidence of TLS with labs outpatient UA 13.7   - G6PD resulted 4.8  - likely has rasburicase related hemolysis, would transfuse to keep Hb >8   - start allopurinol 300mg daily  - IVF   - send CBC, CMP, hemolysis labs and trend daily   - if nontoxic appearing and otherwise stable plan for steroids (100mg prednisone daily x5 days along with inpatient chemotherapy with O-COEP. Possible plan to start steroids over the weekends however will defer to hematology consult team on 8/27 once formally assessed.     Chintan Mccain MD   Hematology/Oncology Fellow   pager [phone]  After 5pm and on weekends page on call fellow 72 year old male with significant history of DLBCL (tx with R-CHOP in 2003, with relapse in 2009 treated with BR) now with multiple areas of palpable lymphadenopathy with biopsies shown to be at least grade IIIA follicular lymphoma. Has had significant weight loss >20lbs in the alst 6 months. Also noted to have an IgG lambda, IgG Kappa, and IgM Lambda monoclonal gammopathies. PET/CT 8/2022 with FDG avid cervical, b/l supraclavicular, chest and a few abdominal LNs, intramuscular masses in the left posterior chest and left uper thigh, scattered FDG avid subcutaneous soft tissue nodules with trase right pleural effusion and moderate left pleural effusion with a loculated fluid in the RML, splenomegaly. s/p biopsy of both cervical and left axillary LNs which showed grade 3A follicular lymphoma positive for BCL6 (3q27) breakpoint translocation and negative for MYC Rearrangement or BCL2-IGH gene rearrangement [translocation t(14;18) present in ~ 85% of FL]. There were areas of > 20 SUV on the PET-CT, repeat whole ymph node biopsy was requested to confirm suspected diagnosis of Grade 3B FL or transformed large cell lymphoma however patient presented with evidence of TLS and now with nausea/fatigue sent to the hospital for further evaluation. Prior to being sent to the ED his labs were suggestive of TLS with UA 13.7 s/p 3mg rasburicase on 8/23.     - outpatient had evidence of TLS with labs outpatient UA 13.7   - G6PD resulted 4.8  - likely has rasburicase related hemolysis, would transfuse to keep Hb >8   - start allopurinol 300mg daily  - IVF   - send CBC, CMP, hemolysis labs and trend daily   - check methemaglobin- if methemaglobinemia can give vitamin C  - if nontoxic appearing and otherwise stable plan for steroids (100mg prednisone daily x5 days along with inpatient chemotherapy with O-COEP. Possible plan to start steroids over the weekends however will defer to hematology consult team on 8/27 once formally assessed.     Chintan Mccain MD   Hematology/Oncology Fellow   pager [phone]  After 5pm and on weekends page on call fellow

## 2022-08-26 NOTE — ED ADULT NURSE REASSESSMENT NOTE - NS ED NURSE REASSESS COMMENT FT1
VSS. Pt tolerating BiPAP well. Denies any chest pain at this time. Pt currently receiving first unit of PRBCs going over 3hrs per MD Schulz. Consent in the chart.  Side rails up, bed in lowest position, oriented to call bell, safety maintained. Will continue to monitor.

## 2022-08-26 NOTE — ED PROVIDER NOTE - CARE PLAN
Principal Discharge DX:	Tumor lysis syndrome   1 Principal Discharge DX:	Anemia  Secondary Diagnosis:	Shortness of breath

## 2022-08-27 DIAGNOSIS — E88.3 TUMOR LYSIS SYNDROME: ICD-10-CM

## 2022-08-27 DIAGNOSIS — R77.8 OTHER SPECIFIED ABNORMALITIES OF PLASMA PROTEINS: ICD-10-CM

## 2022-08-27 DIAGNOSIS — R94.31 ABNORMAL ELECTROCARDIOGRAM [ECG] [EKG]: ICD-10-CM

## 2022-08-27 DIAGNOSIS — I10 ESSENTIAL (PRIMARY) HYPERTENSION: ICD-10-CM

## 2022-08-27 DIAGNOSIS — I50.22 CHRONIC SYSTOLIC (CONGESTIVE) HEART FAILURE: ICD-10-CM

## 2022-08-27 DIAGNOSIS — E11.9 TYPE 2 DIABETES MELLITUS WITHOUT COMPLICATIONS: ICD-10-CM

## 2022-08-27 DIAGNOSIS — N18.2 CHRONIC KIDNEY DISEASE, STAGE 2 (MILD): ICD-10-CM

## 2022-08-27 DIAGNOSIS — C85.90 NON-HODGKIN LYMPHOMA, UNSPECIFIED, UNSPECIFIED SITE: ICD-10-CM

## 2022-08-27 DIAGNOSIS — I50.23 ACUTE ON CHRONIC SYSTOLIC (CONGESTIVE) HEART FAILURE: ICD-10-CM

## 2022-08-27 DIAGNOSIS — I48.20 CHRONIC ATRIAL FIBRILLATION, UNSPECIFIED: ICD-10-CM

## 2022-08-27 DIAGNOSIS — J96.01 ACUTE RESPIRATORY FAILURE WITH HYPOXIA: ICD-10-CM

## 2022-08-27 DIAGNOSIS — Z29.9 ENCOUNTER FOR PROPHYLACTIC MEASURES, UNSPECIFIED: ICD-10-CM

## 2022-08-27 DIAGNOSIS — I95.9 HYPOTENSION, UNSPECIFIED: ICD-10-CM

## 2022-08-27 DIAGNOSIS — D55.0 ANEMIA DUE TO GLUCOSE-6-PHOSPHATE DEHYDROGENASE [G6PD] DEFICIENCY: ICD-10-CM

## 2022-08-27 DIAGNOSIS — E78.5 HYPERLIPIDEMIA, UNSPECIFIED: ICD-10-CM

## 2022-08-27 DIAGNOSIS — C83.30 DIFFUSE LARGE B-CELL LYMPHOMA, UNSPECIFIED SITE: ICD-10-CM

## 2022-08-27 LAB
ALBUMIN SERPL ELPH-MCNC: 3.8 G/DL — SIGNIFICANT CHANGE UP (ref 3.3–5)
ALBUMIN SERPL ELPH-MCNC: 3.9 G/DL — SIGNIFICANT CHANGE UP (ref 3.3–5)
ALBUMIN SERPL ELPH-MCNC: 3.9 G/DL — SIGNIFICANT CHANGE UP (ref 3.3–5)
ALP SERPL-CCNC: 125 U/L — HIGH (ref 40–120)
ALP SERPL-CCNC: 130 U/L — HIGH (ref 40–120)
ALP SERPL-CCNC: 134 U/L — HIGH (ref 40–120)
ALT FLD-CCNC: 13 U/L — SIGNIFICANT CHANGE UP (ref 10–45)
ALT FLD-CCNC: 15 U/L — SIGNIFICANT CHANGE UP (ref 10–45)
ALT FLD-CCNC: 15 U/L — SIGNIFICANT CHANGE UP (ref 10–45)
ANION GAP SERPL CALC-SCNC: 15 MMOL/L — SIGNIFICANT CHANGE UP (ref 5–17)
ANION GAP SERPL CALC-SCNC: 18 MMOL/L — HIGH (ref 5–17)
ANION GAP SERPL CALC-SCNC: 18 MMOL/L — HIGH (ref 5–17)
ANISOCYTOSIS BLD QL: SIGNIFICANT CHANGE UP
AST SERPL-CCNC: 25 U/L — SIGNIFICANT CHANGE UP (ref 10–40)
AST SERPL-CCNC: 26 U/L — SIGNIFICANT CHANGE UP (ref 10–40)
AST SERPL-CCNC: 32 U/L — SIGNIFICANT CHANGE UP (ref 10–40)
BASE EXCESS BLDV CALC-SCNC: 3.8 MMOL/L — HIGH (ref -2–3)
BASOPHILS # BLD AUTO: 0.09 K/UL — SIGNIFICANT CHANGE UP (ref 0–0.2)
BASOPHILS NFR BLD AUTO: 0.9 % — SIGNIFICANT CHANGE UP (ref 0–2)
BILIRUB DIRECT SERPL-MCNC: 1.7 MG/DL — HIGH (ref 0–0.3)
BILIRUB INDIRECT FLD-MCNC: 1.8 MG/DL — HIGH (ref 0.2–1)
BILIRUB SERPL-MCNC: 3.5 MG/DL — HIGH (ref 0.2–1.2)
BILIRUB SERPL-MCNC: 3.5 MG/DL — HIGH (ref 0.2–1.2)
BILIRUB SERPL-MCNC: 4 MG/DL — HIGH (ref 0.2–1.2)
BILIRUB SERPL-MCNC: 4.1 MG/DL — HIGH (ref 0.2–1.2)
BLD GP AB SCN SERPL QL: NEGATIVE — SIGNIFICANT CHANGE UP
BUN SERPL-MCNC: 102 MG/DL — HIGH (ref 7–23)
BUN SERPL-MCNC: 104 MG/DL — HIGH (ref 7–23)
BUN SERPL-MCNC: 104 MG/DL — HIGH (ref 7–23)
CA-I SERPL-SCNC: 1.2 MMOL/L — SIGNIFICANT CHANGE UP (ref 1.15–1.33)
CALCIUM SERPL-MCNC: 9.6 MG/DL — SIGNIFICANT CHANGE UP (ref 8.4–10.5)
CALCIUM SERPL-MCNC: 9.6 MG/DL — SIGNIFICANT CHANGE UP (ref 8.4–10.5)
CALCIUM SERPL-MCNC: 9.8 MG/DL — SIGNIFICANT CHANGE UP (ref 8.4–10.5)
CHLORIDE BLDV-SCNC: 103 MMOL/L — SIGNIFICANT CHANGE UP (ref 96–108)
CHLORIDE SERPL-SCNC: 100 MMOL/L — SIGNIFICANT CHANGE UP (ref 96–108)
CHLORIDE SERPL-SCNC: 98 MMOL/L — SIGNIFICANT CHANGE UP (ref 96–108)
CHLORIDE SERPL-SCNC: 99 MMOL/L — SIGNIFICANT CHANGE UP (ref 96–108)
CK MB CFR SERPL CALC: 5.5 NG/ML — SIGNIFICANT CHANGE UP (ref 0–6.7)
CK SERPL-CCNC: 87 U/L — SIGNIFICANT CHANGE UP (ref 30–200)
CO2 BLDV-SCNC: 30 MMOL/L — HIGH (ref 22–26)
CO2 SERPL-SCNC: 24 MMOL/L — SIGNIFICANT CHANGE UP (ref 22–31)
CO2 SERPL-SCNC: 25 MMOL/L — SIGNIFICANT CHANGE UP (ref 22–31)
CO2 SERPL-SCNC: 26 MMOL/L — SIGNIFICANT CHANGE UP (ref 22–31)
CREAT SERPL-MCNC: 1.81 MG/DL — HIGH (ref 0.5–1.3)
CREAT SERPL-MCNC: 1.84 MG/DL — HIGH (ref 0.5–1.3)
CREAT SERPL-MCNC: 1.92 MG/DL — HIGH (ref 0.5–1.3)
DACRYOCYTES BLD QL SMEAR: SLIGHT — SIGNIFICANT CHANGE UP
EGFR: 37 ML/MIN/1.73M2 — LOW
EGFR: 39 ML/MIN/1.73M2 — LOW
EGFR: 39 ML/MIN/1.73M2 — LOW
ELLIPTOCYTES BLD QL SMEAR: SLIGHT — SIGNIFICANT CHANGE UP
EOSINOPHIL # BLD AUTO: 0 K/UL — SIGNIFICANT CHANGE UP (ref 0–0.5)
EOSINOPHIL NFR BLD AUTO: 0 % — SIGNIFICANT CHANGE UP (ref 0–6)
GAS PNL BLDV: 139 MMOL/L — SIGNIFICANT CHANGE UP (ref 136–145)
GAS PNL BLDV: SIGNIFICANT CHANGE UP
GIANT PLATELETS BLD QL SMEAR: PRESENT — SIGNIFICANT CHANGE UP
GLUCOSE BLDC GLUCOMTR-MCNC: 150 MG/DL — HIGH (ref 70–99)
GLUCOSE BLDC GLUCOMTR-MCNC: 152 MG/DL — HIGH (ref 70–99)
GLUCOSE BLDC GLUCOMTR-MCNC: 181 MG/DL — HIGH (ref 70–99)
GLUCOSE BLDV-MCNC: 139 MG/DL — HIGH (ref 70–99)
GLUCOSE SERPL-MCNC: 146 MG/DL — HIGH (ref 70–99)
GLUCOSE SERPL-MCNC: 146 MG/DL — HIGH (ref 70–99)
GLUCOSE SERPL-MCNC: 148 MG/DL — HIGH (ref 70–99)
HAPTOGLOB SERPL-MCNC: <20 MG/DL — LOW (ref 34–200)
HCO3 BLDV-SCNC: 28 MMOL/L — SIGNIFICANT CHANGE UP (ref 22–29)
HCT VFR BLD CALC: 22.7 % — LOW (ref 39–50)
HCT VFR BLD CALC: 26.6 % — LOW (ref 39–50)
HCT VFR BLDA CALC: 22 % — LOW (ref 39–51)
HGB BLD CALC-MCNC: 7.2 G/DL — LOW (ref 12.6–17.4)
HGB BLD-MCNC: 7.1 G/DL — LOW (ref 13–17)
HGB BLD-MCNC: 8.2 G/DL — LOW (ref 13–17)
HIV 1+2 AB+HIV1 P24 AG SERPL QL IA: SIGNIFICANT CHANGE UP
HYPOCHROMIA BLD QL: SIGNIFICANT CHANGE UP
LACTATE BLDV-MCNC: 1.6 MMOL/L — SIGNIFICANT CHANGE UP (ref 0.5–2)
LDH SERPL L TO P-CCNC: 797 U/L — HIGH (ref 50–242)
LDH SERPL L TO P-CCNC: 843 U/L — HIGH (ref 50–242)
LDH SERPL L TO P-CCNC: 868 U/L — HIGH (ref 50–242)
LYMPHOCYTES # BLD AUTO: 0.16 K/UL — LOW (ref 1–3.3)
LYMPHOCYTES # BLD AUTO: 1.7 % — LOW (ref 13–44)
MAGNESIUM SERPL-MCNC: 1.9 MG/DL — SIGNIFICANT CHANGE UP (ref 1.6–2.6)
MAGNESIUM SERPL-MCNC: 2.2 MG/DL — SIGNIFICANT CHANGE UP (ref 1.6–2.6)
MANUAL SMEAR VERIFICATION: SIGNIFICANT CHANGE UP
MCHC RBC-ENTMCNC: 25.8 PG — LOW (ref 27–34)
MCHC RBC-ENTMCNC: 26.4 PG — LOW (ref 27–34)
MCHC RBC-ENTMCNC: 30.8 GM/DL — LOW (ref 32–36)
MCHC RBC-ENTMCNC: 31.3 GM/DL — LOW (ref 32–36)
MCV RBC AUTO: 83.6 FL — SIGNIFICANT CHANGE UP (ref 80–100)
MCV RBC AUTO: 84.4 FL — SIGNIFICANT CHANGE UP (ref 80–100)
MONOCYTES # BLD AUTO: 0.42 K/UL — SIGNIFICANT CHANGE UP (ref 0–0.9)
MONOCYTES NFR BLD AUTO: 4.4 % — SIGNIFICANT CHANGE UP (ref 2–14)
MRSA PCR RESULT.: SIGNIFICANT CHANGE UP
NEUTROPHILS # BLD AUTO: 8.85 K/UL — HIGH (ref 1.8–7.4)
NEUTROPHILS NFR BLD AUTO: 93 % — HIGH (ref 43–77)
NRBC # BLD: 0 /100 WBCS — SIGNIFICANT CHANGE UP (ref 0–0)
NRBC # BLD: 0 /100 WBCS — SIGNIFICANT CHANGE UP (ref 0–0)
OVALOCYTES BLD QL SMEAR: SLIGHT — SIGNIFICANT CHANGE UP
PCO2 BLDV: 43 MMHG — SIGNIFICANT CHANGE UP (ref 42–55)
PH BLDV: 7.43 — SIGNIFICANT CHANGE UP (ref 7.32–7.43)
PHOSPHATE SERPL-MCNC: 4 MG/DL — SIGNIFICANT CHANGE UP (ref 2.5–4.5)
PHOSPHATE SERPL-MCNC: 4.2 MG/DL — SIGNIFICANT CHANGE UP (ref 2.5–4.5)
PLAT MORPH BLD: NORMAL — SIGNIFICANT CHANGE UP
PLATELET # BLD AUTO: 232 K/UL — SIGNIFICANT CHANGE UP (ref 150–400)
PLATELET # BLD AUTO: 249 K/UL — SIGNIFICANT CHANGE UP (ref 150–400)
PO2 BLDV: 64 MMHG — HIGH (ref 25–45)
POTASSIUM BLDV-SCNC: 3.6 MMOL/L — SIGNIFICANT CHANGE UP (ref 3.5–5.1)
POTASSIUM SERPL-MCNC: 3.7 MMOL/L — SIGNIFICANT CHANGE UP (ref 3.5–5.3)
POTASSIUM SERPL-MCNC: 3.9 MMOL/L — SIGNIFICANT CHANGE UP (ref 3.5–5.3)
POTASSIUM SERPL-MCNC: 4 MMOL/L — SIGNIFICANT CHANGE UP (ref 3.5–5.3)
POTASSIUM SERPL-SCNC: 3.7 MMOL/L — SIGNIFICANT CHANGE UP (ref 3.5–5.3)
POTASSIUM SERPL-SCNC: 3.9 MMOL/L — SIGNIFICANT CHANGE UP (ref 3.5–5.3)
POTASSIUM SERPL-SCNC: 4 MMOL/L — SIGNIFICANT CHANGE UP (ref 3.5–5.3)
PROT SERPL-MCNC: 7.8 G/DL — SIGNIFICANT CHANGE UP (ref 6–8.3)
PROT SERPL-MCNC: 7.8 G/DL — SIGNIFICANT CHANGE UP (ref 6–8.3)
PROT SERPL-MCNC: 7.9 G/DL — SIGNIFICANT CHANGE UP (ref 6–8.3)
RBC # BLD: 2.69 M/UL — LOW (ref 4.2–5.8)
RBC # BLD: 3.18 M/UL — LOW (ref 4.2–5.8)
RBC # FLD: 18.6 % — HIGH (ref 10.3–14.5)
RBC # FLD: 18.7 % — HIGH (ref 10.3–14.5)
RBC BLD AUTO: ABNORMAL
RH IG SCN BLD-IMP: POSITIVE — SIGNIFICANT CHANGE UP
S AUREUS DNA NOSE QL NAA+PROBE: SIGNIFICANT CHANGE UP
SAO2 % BLDV: 94.1 % — HIGH (ref 67–88)
SCHISTOCYTES BLD QL AUTO: SLIGHT — SIGNIFICANT CHANGE UP
SODIUM SERPL-SCNC: 141 MMOL/L — SIGNIFICANT CHANGE UP (ref 135–145)
TARGETS BLD QL SMEAR: SLIGHT — SIGNIFICANT CHANGE UP
TROPONIN T, HIGH SENSITIVITY RESULT: 225 NG/L — HIGH (ref 0–51)
TROPONIN T, HIGH SENSITIVITY RESULT: 243 NG/L — HIGH (ref 0–51)
TROPONIN T, HIGH SENSITIVITY RESULT: 246 NG/L — HIGH (ref 0–51)
URATE SERPL-MCNC: 5.7 MG/DL — SIGNIFICANT CHANGE UP (ref 3.4–8.8)
URATE SERPL-MCNC: 6 MG/DL — SIGNIFICANT CHANGE UP (ref 3.4–8.8)
WBC # BLD: 10.31 K/UL — SIGNIFICANT CHANGE UP (ref 3.8–10.5)
WBC # BLD: 9.55 K/UL — SIGNIFICANT CHANGE UP (ref 3.8–10.5)
WBC # FLD AUTO: 10.31 K/UL — SIGNIFICANT CHANGE UP (ref 3.8–10.5)
WBC # FLD AUTO: 9.55 K/UL — SIGNIFICANT CHANGE UP (ref 3.8–10.5)

## 2022-08-27 PROCEDURE — 76705 ECHO EXAM OF ABDOMEN: CPT | Mod: 26,GC

## 2022-08-27 PROCEDURE — 99223 1ST HOSP IP/OBS HIGH 75: CPT | Mod: GC

## 2022-08-27 PROCEDURE — 99223 1ST HOSP IP/OBS HIGH 75: CPT

## 2022-08-27 PROCEDURE — 93010 ELECTROCARDIOGRAM REPORT: CPT

## 2022-08-27 PROCEDURE — 93306 TTE W/DOPPLER COMPLETE: CPT | Mod: 26

## 2022-08-27 PROCEDURE — 76604 US EXAM CHEST: CPT | Mod: 26,GC

## 2022-08-27 PROCEDURE — 93308 TTE F-UP OR LMTD: CPT | Mod: 26,GC

## 2022-08-27 RX ORDER — INSULIN LISPRO 100/ML
VIAL (ML) SUBCUTANEOUS
Refills: 0 | Status: DISCONTINUED | OUTPATIENT
Start: 2022-08-27 | End: 2022-09-07

## 2022-08-27 RX ORDER — MAGNESIUM SULFATE 500 MG/ML
1 VIAL (ML) INJECTION ONCE
Refills: 0 | Status: COMPLETED | OUTPATIENT
Start: 2022-08-27 | End: 2022-08-27

## 2022-08-27 RX ORDER — ALLOPURINOL 300 MG
300 TABLET ORAL DAILY
Refills: 0 | Status: DISCONTINUED | OUTPATIENT
Start: 2022-08-27 | End: 2022-08-27

## 2022-08-27 RX ORDER — SODIUM CHLORIDE 9 MG/ML
1000 INJECTION, SOLUTION INTRAVENOUS
Refills: 0 | Status: DISCONTINUED | OUTPATIENT
Start: 2022-08-27 | End: 2022-09-07

## 2022-08-27 RX ORDER — ACETAMINOPHEN 500 MG
650 TABLET ORAL EVERY 6 HOURS
Refills: 0 | Status: DISCONTINUED | OUTPATIENT
Start: 2022-08-27 | End: 2022-08-27

## 2022-08-27 RX ORDER — BUMETANIDE 0.25 MG/ML
1 INJECTION INTRAMUSCULAR; INTRAVENOUS
Refills: 0 | Status: DISCONTINUED | OUTPATIENT
Start: 2022-08-27 | End: 2022-08-28

## 2022-08-27 RX ORDER — ONDANSETRON 8 MG/1
4 TABLET, FILM COATED ORAL EVERY 8 HOURS
Refills: 0 | Status: DISCONTINUED | OUTPATIENT
Start: 2022-08-27 | End: 2022-08-27

## 2022-08-27 RX ORDER — GLIMEPIRIDE 1 MG
1 TABLET ORAL
Qty: 0 | Refills: 0 | DISCHARGE

## 2022-08-27 RX ORDER — METOPROLOL TARTRATE 50 MG
12.5 TABLET ORAL EVERY 12 HOURS
Refills: 0 | Status: COMPLETED | OUTPATIENT
Start: 2022-08-27 | End: 2022-09-02

## 2022-08-27 RX ORDER — LANOLIN ALCOHOL/MO/W.PET/CERES
3 CREAM (GRAM) TOPICAL AT BEDTIME
Refills: 0 | Status: DISCONTINUED | OUTPATIENT
Start: 2022-08-27 | End: 2022-09-07

## 2022-08-27 RX ORDER — DEXTROSE 50 % IN WATER 50 %
15 SYRINGE (ML) INTRAVENOUS ONCE
Refills: 0 | Status: DISCONTINUED | OUTPATIENT
Start: 2022-08-27 | End: 2022-09-07

## 2022-08-27 RX ORDER — CHLORHEXIDINE GLUCONATE 213 G/1000ML
1 SOLUTION TOPICAL DAILY
Refills: 0 | Status: DISCONTINUED | OUTPATIENT
Start: 2022-08-27 | End: 2022-09-07

## 2022-08-27 RX ORDER — GLUCAGON INJECTION, SOLUTION 0.5 MG/.1ML
1 INJECTION, SOLUTION SUBCUTANEOUS ONCE
Refills: 0 | Status: DISCONTINUED | OUTPATIENT
Start: 2022-08-27 | End: 2022-09-07

## 2022-08-27 RX ORDER — COLCHICINE 0.6 MG
1 TABLET ORAL
Qty: 0 | Refills: 0 | DISCHARGE

## 2022-08-27 RX ORDER — ISOSORBIDE DINITRATE 5 MG/1
1 TABLET ORAL
Qty: 0 | Refills: 0 | DISCHARGE

## 2022-08-27 RX ORDER — DEXTROSE 50 % IN WATER 50 %
25 SYRINGE (ML) INTRAVENOUS ONCE
Refills: 0 | Status: DISCONTINUED | OUTPATIENT
Start: 2022-08-27 | End: 2022-09-07

## 2022-08-27 RX ORDER — HYDRALAZINE HCL 50 MG
10 TABLET ORAL EVERY 8 HOURS
Refills: 0 | Status: DISCONTINUED | OUTPATIENT
Start: 2022-08-27 | End: 2022-09-01

## 2022-08-27 RX ORDER — HYDRALAZINE HCL 50 MG
5 TABLET ORAL THREE TIMES A DAY
Refills: 0 | Status: DISCONTINUED | OUTPATIENT
Start: 2022-08-27 | End: 2022-08-27

## 2022-08-27 RX ORDER — APIXABAN 2.5 MG/1
5 TABLET, FILM COATED ORAL EVERY 12 HOURS
Refills: 0 | Status: DISCONTINUED | OUTPATIENT
Start: 2022-08-27 | End: 2022-08-29

## 2022-08-27 RX ORDER — BUMETANIDE 0.25 MG/ML
1 INJECTION INTRAMUSCULAR; INTRAVENOUS
Refills: 0 | Status: DISCONTINUED | OUTPATIENT
Start: 2022-08-27 | End: 2022-08-27

## 2022-08-27 RX ORDER — DEXTROSE 50 % IN WATER 50 %
12.5 SYRINGE (ML) INTRAVENOUS ONCE
Refills: 0 | Status: DISCONTINUED | OUTPATIENT
Start: 2022-08-27 | End: 2022-09-07

## 2022-08-27 RX ORDER — SPIRONOLACTONE 25 MG/1
1 TABLET, FILM COATED ORAL
Qty: 0 | Refills: 0 | DISCHARGE

## 2022-08-27 RX ORDER — ALLOPURINOL 300 MG
100 TABLET ORAL DAILY
Refills: 0 | Status: DISCONTINUED | OUTPATIENT
Start: 2022-08-27 | End: 2022-08-30

## 2022-08-27 RX ORDER — ATORVASTATIN CALCIUM 80 MG/1
40 TABLET, FILM COATED ORAL AT BEDTIME
Refills: 0 | Status: DISCONTINUED | OUTPATIENT
Start: 2022-08-27 | End: 2022-09-01

## 2022-08-27 RX ADMIN — Medication 1: at 16:25

## 2022-08-27 RX ADMIN — BUMETANIDE 1 MILLIGRAM(S): 0.25 INJECTION INTRAMUSCULAR; INTRAVENOUS at 17:53

## 2022-08-27 RX ADMIN — CHLORHEXIDINE GLUCONATE 1 APPLICATION(S): 213 SOLUTION TOPICAL at 16:26

## 2022-08-27 RX ADMIN — ATORVASTATIN CALCIUM 40 MILLIGRAM(S): 80 TABLET, FILM COATED ORAL at 21:17

## 2022-08-27 RX ADMIN — Medication 100 GRAM(S): at 07:23

## 2022-08-27 RX ADMIN — Medication 5 MILLIGRAM(S): at 21:17

## 2022-08-27 RX ADMIN — Medication 100 MILLIGRAM(S): at 17:52

## 2022-08-27 RX ADMIN — APIXABAN 5 MILLIGRAM(S): 2.5 TABLET, FILM COATED ORAL at 17:52

## 2022-08-27 NOTE — PATIENT PROFILE ADULT - FALL HARM RISK - HARM RISK INTERVENTIONS

## 2022-08-27 NOTE — PROGRESS NOTE ADULT - ASSESSMENT
72 year old male with significant history of HTN, CKD stage II, complete heart block (PPM in place), HLD, HFrEF (45% in 2019), and DLBCL (tx with R-CHOP in 2003, with relapse in 2009 treated with BR) now with recently diagnosed grade 3 follicular lymphoma on 8/23 who presents with rasburicase associated G6PD hemolytic anemia in setting of TLS on 8/23 and AHRF.

## 2022-08-27 NOTE — CHART NOTE - NSCHARTNOTEFT_GEN_A_CORE
POCUS conducted at bedside. RV similar in size to LV. With global dysfunction noted. No pericardial effusion. IVC 2.2cm without respiratory variation. Aortic calcification noted. Chest with bilateral B lines. Left pleural base consolidation with small pleural effusion. Right anterior B lines and possible consolidation of R anterior mid lobe. Unremarkable kidneys. No ascites. Bladder distended with anechoic fluid. Gallbladder distended with sludge and negative Montaño's sign.     Reccomendations  -downtitrate Fio2 to 30%  -If decompensates obtain ABG  -Will require formal echo during admission.  -Cardiac enzymes, EKG  -cardiology consult.   -Strict intake and output  -Wean bipap to NC as tolerated.

## 2022-08-27 NOTE — PROGRESS NOTE ADULT - PROBLEM SELECTOR PLAN 4
s/p rasburicase  - uric acid 6.0, now stable potassium  - now on allopurinol  - repeat CMP q6hrs, trend labs  - uric acid, T+S, LDH, hapto, bili, cbc, cmp s/p rasburicase  - uric acid 6.0, potassium stable   - now on allopurinol 100mg   - f/u midnight labs

## 2022-08-27 NOTE — H&P ADULT - PROBLEM SELECTOR PLAN 4
- p/w acute hypotension with MAPs < 65  - MICU consulted with recommendations to give albumin 5% 250 cc bolus, pt s/p  cc bolus (caution i/s/o HF)  - currently pt 100/60, MAP >65, HDS   - f/u repeat TTE   - CTM SBP, monitor MAP > 65

## 2022-08-27 NOTE — H&P ADULT - NSICDXPASTMEDICALHX_GEN_ALL_CORE_FT
PAST MEDICAL HISTORY:  Atrial flutter, unspecified type     DM type 2 (diabetes mellitus, type 2) Never on insulin    Essential hypertension     Impaired memory     Moderate mitral regurgitation     Non-Hodgkins Lymphoma In [facility] for > 10 years    Pleural effusion     Rhinitis, allergic     Systolic heart failure

## 2022-08-27 NOTE — PROGRESS NOTE ADULT - PROBLEM SELECTOR PLAN 1
- p/w respiratory distress possibly 2/2 acute hemolytic anemia and HF exacerbation vs. ACS r/o (pt with known chronic right sided loculated effusion)  - Blood gas c/f respiratory acidosis ---> repeat VBG improved  - current VBG 08/27 0500: 7.41 (7.31)/ pCO2 42 (32)/HCO3 28 (16)/ La 1.6 from 3.5/1.8  - improving blood gas, wean off bipap 11 ipap/5 epap  - overnight POCUS - Chest with bilateral B lines. Left pleural base consolidation with small pleural effusion. Right anterior B lines and possible consolidation of R anterior mid lobe.  - f/u TTE, repeat VBG when pt off bipap  - could potentially benefit from diuresis in setting of possible pleural effusion but also with TLS, improving labs will c/w trend Presenting with respiratory distress possibly 2/2 acute hemolytic anemia and HF exacerbation vs. ACS r/o (pt with known chronic right sided loculated effusion). Started on BiPAP at 11 ipap/5 epap  - overnight POCUS - Chest with bilateral B lines. Left pleural base consolidation with small pleural effusion. Right anterior B lines and possible consolidation of R anterior mid lobe  - now off BiPAP on NC at 3L saturating well   - f/u TTE  - could potentially benefit from diuresis in setting of possible pleural effusion but also with TLS, improving labs will c/w trend

## 2022-08-27 NOTE — CONSULT NOTE ADULT - PROBLEM SELECTOR RECOMMENDATION 2
Likely demand, has known non-obstructive CAD, no chest pain or EKG changes   -Trend troponin to peak

## 2022-08-27 NOTE — H&P ADULT - NSHPSOCIALHISTORY_GEN_ALL_CORE
Social Hx  - He is currently retired. He used to be an .  - No significant environmental exposure to chemicals  - Lives by himself and his wife lives in [place].  - Former smoker. He smoked for 10 years less than 1 pack a day. He quit 20 years ago    Family hx  - Two brothers non Hodgkins lymphoma. At least one required chemotherapy. sister had cervical cancer first diagnosed 2007 and then 3 years later with more cancer discovered  - Mother and father passed away from heart disease and diabetes.

## 2022-08-27 NOTE — H&P ADULT - PROBLEM SELECTOR PLAN 6
- h/o HFrEF EF 45%, diffuse LV hypokinesis, mod pulmHTN; followed by cardiology outpt  - p/w ARGUETA, on bipap, imaging c/f pleural effusion  - proBNP 67k compared to 28K in 2019   - MICU bedside pocus w/diffuse B lines and pleuff  - troponin elevated, continue to trend   - f/u TTE, compare to 2019  - f/u troponin   - f/u EKG  - continue to hold carvedilol, f/u cardiology in AM - h/o HFrEF EF 45%, diffuse LV hypokinesis, mod pulmHTN; followed by cardiology outpt  - p/w ARGUETA, on bipap, imaging c/f pleural effusion  - proBNP 67k compared to 28K in 2019   - MICU bedside pocus w/diffuse B lines and pleural effusion  - troponin elevated, continue to trend   - f/u TTE, compare to 2019  - f/u troponin   - f/u EKG  - continue to hold carvedilol, f/u cardiology in AM

## 2022-08-27 NOTE — H&P ADULT - PROBLEM SELECTOR PLAN 7
- h/o grade 3 follicular lymphoma, pt was pending surgical biopsy on 8/23, however in heme/onc outpt labs s/f TLS with uric acid 13.7, pt was started rasburicase on 8/23 and developed rasburicase-triggered G6PD hemolysis with hgb 5.7 on 8/26  - UA 6.4 from 13.7 s/p rasburicase on 8/23  - trend hemolysis labs   - per heme recommendations 8/26:   -- continue with allopurinol daily     - follow-up with heme on 8/27

## 2022-08-27 NOTE — CONSULT NOTE ADULT - ASSESSMENT
71 yo M w/ PMH CHB s/p PPM upgraded to CRT-P, HFrEF 2/2 chemo, DLBCL s/p chemo, HTN, non-obsturcitve CAD, Aflutter on apixaban who presents w/ SOB and anemia. 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 He was started on rasburicase for TLS but with notable G6PD hemolytics anemia. Was told to come into the ED and given 3u PRBC in total so far with improvement of anemia. He was also started on BiPAP given his tachypnea. CXRAY shows R pleural effusion with known R middle lobe opacity.        71 yo M w/ PMH CHB s/p PPM upgraded to CRT-P, HFrEF 2/2 chemo, DLBCL s/p chemo, HTN, non-obsturcitve CAD, Aflutter on apixaban who presents w/ SOB and anemia. 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 He was started on rasburicase for TLS but with notable G6PD hemolytics anemia. Was told to come into the ED and given 3u PRBC in total so far with improvement of anemia. He was also started on BiPAP given his tachypnea. CXRAY shows R pleural effusion with known R middle lobe opacity found to be in heart failure exacerbation.     Cardiac Testing:  Echo:  8/27/22: LVEF 20%, decreased RV function, severe MR  11/7/2019: LVEF 40%, moderate pulmonary hypertension, enlarged LA size, severe mitral regurgitation    Cardiac Cath: 5/2019, 70% 1st diagonal, 60% distal circumflex, otherwise no occlusive disease      Pacemaker: 9/30/2019, [employer] [id number] BiV DDDR

## 2022-08-27 NOTE — H&P ADULT - PROBLEM SELECTOR PLAN 12
- CKD   - BUN 54/1.9  - avoid nephrotoxic   - bladder scan, urinary retention 330   - staright cath > 300 c - CKD   - /1.9 --> improved to /cr 1.8  - avoid nephrotoxic   - bladder scan, urinary retention 330 cc, pending repeat scan s/p pt void   - staright cath if repeat scan with  > 300 cc of residual urine - CKD   - /1.9 --> improved to /cr 1.8  - avoid nephrotoxic   - bladder scan, urinary retention 330 cc, pending repeat scan s/p pt void   - straight cath if repeat scan with  > 300 cc of residual urine

## 2022-08-27 NOTE — PROGRESS NOTE ADULT - PROBLEM SELECTOR PLAN 3
- EKG with prolonged QTc 500s from 600s   - repeat EKG with 527  - troponin 225 at 8/26 2300  - no CP at this time, elevated troponin possibly 2/2 to demand ischemia   - f/u repeat troponin, cardiac enzymes - EKG with prolonged QTc 500s from 600s   - repeat EKG with 527  - troponin 225 at 8/26 2300  - no CP at this time, elevated troponin possibly 2/2 to demand ischemia   - repeat trops with minor uptrend  - f/u midnight labs, trops

## 2022-08-27 NOTE — PROGRESS NOTE ADULT - PROBLEM SELECTOR PLAN 2
- p/w G6PD hemolysis (low hapto, high LDH/alk/bili) s/p rasburicase for TLS on 8/23 w/hgb 5 on 8/26 (hgb 9 on 8/23)  - d/c rasburicase in setting of G6PD  - pt s/p 2u pRBC 8/26, post transfusion CBC s/p 1st unity = 6; s/p 2nd unit = 7; s/p 3rd unit = pending  - currently receiving 3rd unit prbc on 8/27 at 0400 - 0700am, goal hgb > 8 per heme onc, will repeat CBC in 2 hrs following  - f/u repeat G6PD presenting with G6PD hemolysis (low hapto, high LDH/alk/bili) s/p rasburicase for TLS on 8/23 w/hgb 5 on 8/26 (hgb 9 on 8/23)  - d/c rasburicase in setting of G6PD  - pt s/p 2u pRBC 8/26, post transfusion CBC s/p 1st unity = 6; s/p 2nd unit = 7; s/p 3rd unit = 8.2  - f/u midnight labs, transfuse goals >8 with elevated trops

## 2022-08-27 NOTE — H&P ADULT - NSHPREVIEWOFSYSTEMS_GEN_ALL_CORE
REVIEW OF SYSTEMS:  CONSTITUTIONAL: +weakness, no fevers or chills  EYES/ENT: No visual changes;  No vertigo or throat pain   NECK: No pain or stiffness  RESPIRATORY: +SOB, nocough, wheezing, hemoptysis  CARDIOVASCULAR: No chest pain or palpitations  GASTROINTESTINAL: No abdominal or epigastric pain. No nausea, vomiting, or hematemesis; No diarrhea or constipation. No melena or hematochezia.  GENITOURINARY: No dysuria, frequency or hematuria  NEUROLOGICAL: No numbness or weakness  SKIN: No itching, rashes REVIEW OF SYSTEMS:  CONSTITUTIONAL: +weakness, no fevers or chills  EYES/ENT: No visual changes;  No vertigo or throat pain   NECK: No pain or stiffness  RESPIRATORY: +SOB, no cough, wheezing, hemoptysis  CARDIOVASCULAR: No chest pain or palpitations  GASTROINTESTINAL: No abdominal or epigastric pain. No nausea, vomiting, or hematemesis; No diarrhea or constipation. No melena or hematochezia.  GENITOURINARY: No dysuria, frequency or hematuria  NEUROLOGICAL: No numbness or weakness  SKIN: No itching, rashes  PSYCH: no anxiety or depression  ENDO: no heat or cold intolerance

## 2022-08-27 NOTE — H&P ADULT - NSHPPHYSICALEXAM_GEN_ALL_CORE
ICU Vital Signs Last 24 Hrs  T(C): 36.4 (27 Aug 2022 05:55), Max: 36.8 (26 Aug 2022 16:13)  T(F): 97.6 (27 Aug 2022 05:55), Max: 98.3 (26 Aug 2022 16:13)  HR: 109 (27 Aug 2022 05:55) (87 - 114)  BP: 125/75 (27 Aug 2022 05:55) (85/36 - 126/73)  BP(mean): 79 (27 Aug 2022 00:45) (50 - 79)  ABP: --  ABP(mean): --  RR: 20 (27 Aug 2022 05:55) (16 - 32)  SpO2: 100% (27 Aug 2022 05:55) (96% - 100%)    O2 Parameters below as of 27 Aug 2022 05:55  Patient On (Oxygen Delivery Method): BiPAP/CPAP    PHYSICAL EXAM:  GENERAL: appears uncomfortable on BiPAP, but tolerating without complaints  HEENT:  Head atraumatic, EOMI, PERRLA, conjunctiva and sclera clear; dry mucous membranes  NECK: Supple, No JVD, no lymphadenopathy  CHEST/LUNG: Clear to auscultation anteriorly, posterior right lung sounds with crackles at bases; saturating 100% on RA while on BIPAP 11/5  HEART: tachycardic, regular rhythm; No murmurs, rubs, or gallops  ABDOMEN: Bowel sounds present; Soft, Nontender, Nondistended.   EXTREMITIES:  2+ Peripheral Pulses, brisk capillary refill. No clubbing, cyanosis, or edema  NERVOUS SYSTEM:  Alert & Oriented X3, non-focal and spontaneous movements of all extremities  SKIN: No rashes or lesions, +slight jaundice ICU Vital Signs Last 24 Hrs  T(C): 36.4 (27 Aug 2022 05:55), Max: 36.8 (26 Aug 2022 16:13)  T(F): 97.6 (27 Aug 2022 05:55), Max: 98.3 (26 Aug 2022 16:13)  HR: 109 (27 Aug 2022 05:55) (87 - 114)  BP: 125/75 (27 Aug 2022 05:55) (85/36 - 126/73)  BP(mean): 79 (27 Aug 2022 00:45) (50 - 79)  ABP: --  ABP(mean): --  RR: 20 (27 Aug 2022 05:55) (16 - 32)  SpO2: 100% (27 Aug 2022 05:55) (96% - 100%)    O2 Parameters below as of 27 Aug 2022 05:55  Patient On (Oxygen Delivery Method): BiPAP/CPAP    PHYSICAL EXAM:  constitutional: appears uncomfortable on BiPAP, but tolerating without complaints  HEENT:  Head atraumatic, EOMI, PERRLA, conjunctiva and sclera clear; dry mucous membranes  NECK: Supple, No JVD, no lymphadenopathy  resp: Clear to auscultation anteriorly, posterior right lung sounds with crackles at bases; saturating 100% on RA while on BIPAP 11/5  cv: tachycardic, regular rhythm; No murmurs, rubs, or gallops  gi: Bowel sounds present; Soft, Nontender, Nondistended.   EXTREMITIES:  2+ Peripheral Pulses, brisk capillary refill. No clubbing, cyanosis, or edema  NERVOUS SYSTEM:  Alert & Oriented X3, non-focal and spontaneous movements of all extremities  SKIN: No rashes or lesions, +slight jaundice

## 2022-08-27 NOTE — PROGRESS NOTE ADULT - PROBLEM SELECTOR PLAN 6
- possibly i/s/o medication induced QT prolongation  - EKG 8/26 1638, rate 100 bpm,  ms, QTc 691 ms, ventricular paced  - EKG 8/26 2100, rate 94 bpm, Ekaterina 156, , QTc 547 ms, ventricular paced   - EKG 8/27 QTc 527  - f/u repeat EKG 8/27 0500  - f/u repeat troponin  - f/u repeat mag - possibly i/s/o medication induced QT prolongation  - EKG 8/26 1638, rate 100 bpm,  ms, QTc 691 ms, ventricular paced  - EKG 8/26 2100, rate 94 bpm, Ekaterina 156, , QTc 547 ms, ventricular paced   - EKG 8/27 QTc 527  - f/u EKG

## 2022-08-27 NOTE — H&P ADULT - NSHPLABSRESULTS_GEN_ALL_CORE
.  LABS:                         7.1    9.55  )-----------( 232      ( 27 Aug 2022 05:02 )             22.7     08-26    141  |  98  |  104<H>  ----------------------------<  148<H>  3.9   |  25  |  1.92<H>    Ca    9.8      26 Aug 2022 23:08  Mg     1.8     08-26    TPro  7.8  /  Alb  3.8  /  TBili  4.1<H>  /  DBili  x   /  AST  32  /  ALT  15  /  AlkPhos  130<H>  08-26        Serum Pro-Brain Natriuretic Peptide: 10713 pg/mL (08-26 @ 17:39)        RADIOLOGY, EKG & ADDITIONAL TESTS: Reviewed.       CXR- repeat  Similar right sided opacities and loculated pleural effusion.    CXR 1st:   INTERPRETATION:  No new focal consolidation.  Right apical opacity consistent with nodular opacities seen at recent   PET/CT 8/6/22. Right mid lung opacity consistent with persistent loculated effusion.  Trace left pleural effusion seen on CT of sameday not well appreciated   on frontal radiograph.    PROCEDURE:  CT of the Abdomen and Pelvis was performed.  Sagittal and coronal reformats were performed.    FINDINGS:  LOWER CHEST: Trace left pleural effusion. Emphysema. Partially imaged   cardiac device leads.    LIVER: Prominent Riedel's lobe.  BILE DUCTS: Normal caliber.  GALLBLADDER: Cholelithiasis.  SPLEEN: Splenomegaly.  PANCREAS: Within normal limits.  ADRENALS: Within normal limits.  KIDNEYS/URETERS: Within normal limits.    BLADDER: Within normal limits.  REPRODUCTIVE ORGANS: Prostate is enlarged.    BOWEL: Mural thickening of small bowel loopsin the right hemiabdomen   suggestive of enteritis. Colonic diverticulosis without diverticulitis.   No bowel obstruction. Appendix is not visualized. No evidence of   inflammation in the pericecal region.  PERITONEUM: Small volume ascites.  VESSELS:Atherosclerotic changes.  RETROPERITONEUM/LYMPH NODES: Similar retrocrural lymph node measures 1.2   x 0.7 cm (3:8). Multiple pericaval and periaortic lymph nodes, for   reference a para-aortic lymph node measures 2.3 x 1.0 cm (3:37).  ABDOMINAL WALL: Redemonstrated subcutaneous nodules along the right   lateral and left anterior abdominal wall, not significantly changed when   compared to PET CT 8/6/2022.  BONES: Degenerative changes.    IMPRESSION:  Probable enteritis.    Small volume ascites.    Redemonstrated additional findings consistent with history of lymphoma   without significant change when compared to PET/CT 8/6/2022.          Nov 2019 EF 45 %  Conclusions:  Moderate left ventricular enlargement.  Mild diffuse left ventricular hypokinesis. Estiamted  ejection fraction 45%.  Severe functional mitral regurgitation.  No thrombus in the left atrium or LA appendage.  Moderate pulmonary hypertension.  No evidence of a patent foramen ovale with color Doppler.

## 2022-08-27 NOTE — H&P ADULT - ASSESSMENT
72 year old male with significant history of HTN, CKD stage II, A flutter (PPM in place), HLD, HFrEF (45% in 2019), and DLBCL (tx with R-CHOP in 2003, with relapse in 2009 treated with BR) now with recently diagnosed grade 3 follicular lymphoma on 8/23 who presents with rasburicase associated G6PD hemolytic anemia s/p TLS on 8/23 and AHRF.    72 year old male with significant history of HTN, CKD stage II, complete heart block (PPM in place), HLD, HFrEF (45% in 2019), and DLBCL (tx with R-CHOP in 2003, with relapse in 2009 treated with BR) now with recently diagnosed grade 3 follicular lymphoma on 8/23 who presents with rasburicase associated G6PD hemolytic anemia in setting of TLS on 8/23 and AHRF.

## 2022-08-27 NOTE — CONSULT NOTE ADULT - SUBJECTIVE AND OBJECTIVE BOX
Hematology Consult Note    HPI as per admitting team:   72 year old male with significant history of HTN, CKD stage II, A flutter (PPM in place), HLD, HFrEF (45% in 2019), and DLBCL (tx with R-CHOP in 2003, with relapse in 2009 treated with BR) now with recently diagnosed grade 3 follicular lymphoma on 8/23 who presents with significant anemia and SOB. The pt was pending a surgical biopsy on 8/25. However, pt was found to be in TLS with associated nausea and fatigue on 8/23; at this time rasburicase was started and lowered the uric acid from 13 to 6, however, pt developed rasburicase-triggered G6PD hemolytic anemia with associated jaundice and dark colored urine as well as hemoglobin of 5.7 and hypotension on 8/26 requiring further evaluation. In ED, the pt received up to 2 units of pRBC with improvement in hemoglobin to 6.1 from 5.7 s/p 1st unit and improvement to hgb 7.1 s/p 2nd unit. The pt was also hypotensive and tachypneic with respiratory acidosis. MICU was consulted at bedside and recommend caution dose of  cc bolus and albumin with slight improvement in MAPs to be > 65 mmHg. Regarding the pt's respiratory distress, he was escalated to BiPAP. Hospital course complicated by EKG with QTc prolongation and elevated troponin, magnesium repleted. Blood cultures were collected and in process. The pt completed was transferred to medicine floor once pt was HDS.     On evaluation, the pt states he is breathing "okay" while on the BiPAP, tolerated throughout the night. He denies any chest pain, increased swelling, or abdominal pain, N/V/D, hematochezia or hematuria at this time. Pt is tachypneic, but improved from arrival. Pt recommended to call pt's daughter, no answer. Reviewed pt's documentation.      Care Providers:    PMD: Dr Amari Hickman  Endo: Dr Carter Vigil  Cardiologist: Dr. Don [phone] fax [phone]    =======================================================    Vonnie (daughter): [phone] - takes all healthcare related calls.    (27 Aug 2022 03:15)        REVIEW OF SYSTEMS:    CONSTITUTIONAL: No weakness, fevers or chills  EYES/ENT: No visual changes;  No vertigo or throat pain   NECK: No pain or stiffness  RESPIRATORY: No cough, wheezing, hemoptysis; No shortness of breath  CARDIOVASCULAR: No chest pain or palpitations  GASTROINTESTINAL: No abdominal or epigastric pain. No nausea, vomiting, or hematemesis; No diarrhea or constipation. No melena or hematochezia.  GENITOURINARY: No dysuria, frequency or hematuria  NEUROLOGICAL: No numbness or weakness  SKIN: No itching, burning, rashes, or lesions   All other review of systems is negative unless indicated above.    PAST MEDICAL & SURGICAL HISTORY:  Non-Hodgkins Lymphoma  [facility] &[facility]; 10 [facility]      DM type 2 (diabetes mellitus, type 2)  Never on insulin      Essential hypertension      Rhinitis, allergic      Systolic heart failure      Moderate mitral regurgitation      Pleural effusion      Atrial flutter, unspecified type      Impaired memory      Non-Hodgkin lymphoma  h/o axillary dissection      Cardiac pacemaker          FAMILY HISTORY:  Family history of CHF (congestive heart failure)  Mother    Family history of non-Hodgkin&#x27;s lymphoma (Sibling)        SOCIAL HISTORY:     Allergies    No Known Allergies    Intolerances        MEDICATIONS  (STANDING):  allopurinol 300 milliGRAM(s) Oral daily  atorvastatin 40 milliGRAM(s) Oral at bedtime  chlorhexidine 2% Cloths 1 Application(s) Topical daily    MEDICATIONS  (PRN):  melatonin 3 milliGRAM(s) Oral at bedtime PRN Insomnia      OBJECTIVE   Height (cm): 183.5 (08-26 @ 15:14)  Weight (kg): 76.7 (08-26 @ 15:14)  BMI (kg/m2): 22.8 (08-26 @ 15:14)  BSA (m2): 1.99 (08-26 @ 15:14)    T(F): 97.6 (08-27-22 @ 05:55), Max: 98.3 (08-26-22 @ 16:13)  HR: 109 (08-27-22 @ 05:55)  BP: 125/75 (08-27-22 @ 05:55)  RR: 20 (08-27-22 @ 05:55)  SpO2: 100% (08-27-22 @ 05:55)  Wt(kg): --    PHYSICAL EXAM   GENERAL: NAD, well-developed  HEAD:  Atraumatic, Normocephalic  EYES: EOMI, PERRLA, conjunctiva and sclera clear  NECK: Supple, No JVD  CHEST/LUNG: Clear to auscultation bilaterally; No wheeze  HEART: Regular rate and rhythm; No murmurs, rubs, or gallops  ABDOMEN: Soft, Nontender, Nondistended; Bowel sounds present  EXTREMITIES:  2+ Peripheral Pulses, No clubbing, cyanosis, or edema  NEUROLOGY: non-focal  SKIN: No rashes or lesions                          7.1    9.55  )-----------( 232      ( 27 Aug 2022 05:02 )             22.7       08-27    141  |  100  |  102<H>  ----------------------------<  146<H>  3.7   |  26  |  1.84<H>    Ca    9.6      27 Aug 2022 05:04  Phos  4.0     08-27  Mg     1.9     08-27    TPro  7.8  /  Alb  3.9  /  TBili  3.5<H>  /  DBili  1.7<H>  /  AST  26  /  ALT  15  /  AlkPhos  125<H>  08-27      Magnesium, Serum: 1.9 mg/dL (08-27 @ 05:04)  Phosphorus Level, Serum: 4.0 mg/dL (08-27 @ 05:04)  Uric Acid, Serum: 6.0 mg/dL (08-27 @ 05:04)  Lactate Dehydrogenase, Serum: 843 U/L (08-27 @ 05:04)  Lactate Dehydrogenase, Serum: 868 U/L (08-26 @ 23:08)  Uric Acid, Serum: 6.4 mg/dL (08-26 @ 17:39)  Lactate Dehydrogenase, Serum: 1161 U/L (08-26 @ 17:39)  Magnesium, Serum: 1.8 mg/dL (08-26 @ 17:39)    RADIOLOGY & ADDITIONAL TESTING:  < from: Xray Chest 1 View- PORTABLE-Urgent (Xray Chest 1 View- PORTABLE-Urgent .) (08.26.22 @ 23:11) >  FINDINGS:    Overlying pacemaker limits evaluation. The study is redemonstrated   right-sided opacities and loculated effusion. Left costophrenic angle is   not imaged, limiting evaluation. Device overlies the heart. Heart size   not well evaluated. Left-sided cardiac device. No acute osseous findings.    IMPRESSION:    Similar right sided opacities and loculated pleural effusion.    ******PRELIMINARY REPORT******      ******PRELIMINARY REPORT******      ZAHRA HESTER MD; Resident Radiologist  This document is a PRELIMINARY interpretation and is pending final   attending approval. Aug 26 2022 11:01PM    < end of copied text >     Hematology Consult Note    HPI as per admitting team:   72 year old male with significant history of HTN, CKD stage II, A flutter (PPM in place), HLD, HFrEF (45% in 2019), and DLBCL (tx with R-CHOP in 2003, with relapse in 2009 treated with BR) now with recently diagnosed grade 3 follicular lymphoma on 8/23 who presents with significant anemia and SOB. The pt was pending a surgical biopsy on 8/25. However, pt was found to be in TLS with associated nausea and fatigue on 8/23; at this time rasburicase was started and lowered the uric acid from 13 to 6, however, pt developed rasburicase-triggered G6PD hemolytic anemia with associated jaundice and dark colored urine as well as hemoglobin of 5.7 and hypotension on 8/26 requiring further evaluation. In ED, the pt received up to 2 units of pRBC with improvement in hemoglobin to 6.1 from 5.7 s/p 1st unit and improvement to hgb 7.1 s/p 2nd unit. The pt was also hypotensive and tachypneic with respiratory acidosis. MICU was consulted at bedside and recommend caution dose of  cc bolus and albumin with slight improvement in MAPs to be > 65 mmHg. Regarding the pt's respiratory distress, he was escalated to BiPAP. Hospital course complicated by EKG with QTc prolongation and elevated troponin, magnesium repleted. Blood cultures were collected and in process. The pt completed was transferred to medicine floor once pt was HDS.     On evaluation, the pt states he is breathing "okay" while on the BiPAP, tolerated throughout the night. He denies any chest pain, increased swelling, or abdominal pain, N/V/D, hematochezia or hematuria at this time. Pt is tachypneic, but improved from arrival. Pt recommended to call pt's daughter, no answer. Reviewed pt's documentation.      Care Providers:    PMD: Dr Amari Hickman  Endo: Dr Carter Vigil  Cardiologist: Dr. Don [phone] fax [phone]    =======================================================    Vonnie (daughter): [phone] - takes all healthcare related calls.    (27 Aug 2022 03:15)        REVIEW OF SYSTEMS:    CONSTITUTIONAL: No weakness, fevers or chills  EYES/ENT: No visual changes;  No vertigo or throat pain   NECK: No pain or stiffness  RESPIRATORY: No cough, wheezing, hemoptysis; No shortness of breath while on BiPAP  CARDIOVASCULAR: No chest pain or palpitations  GASTROINTESTINAL: No abdominal or epigastric pain. No nausea, vomiting, or hematemesis; No diarrhea or constipation. No melena or hematochezia.  GENITOURINARY: No dysuria, frequency or hematuria  NEUROLOGICAL: No numbness or weakness  SKIN: No itching, burning, rashes, or lesions   All other review of systems is negative unless indicated above.    PAST MEDICAL & SURGICAL HISTORY:  Non-Hodgkins Lymphoma  In [facility] for &gt; 10 years      DM type 2 (diabetes mellitus, type 2)  Never on insulin      Essential hypertension      Rhinitis, allergic      Systolic heart failure      Moderate mitral regurgitation      Pleural effusion      Atrial flutter, unspecified type      Impaired memory      Non-Hodgkin lymphoma  h/o axillary dissection      Cardiac pacemaker          FAMILY HISTORY:  Family history of CHF (congestive heart failure)  Mother    Family history of non-Hodgkin&#x27;s lymphoma (Sibling)        SOCIAL HISTORY:     Allergies    No Known Allergies    Intolerances        MEDICATIONS  (STANDING):  allopurinol 300 milliGRAM(s) Oral daily  atorvastatin 40 milliGRAM(s) Oral at bedtime  chlorhexidine 2% Cloths 1 Application(s) Topical daily    MEDICATIONS  (PRN):  melatonin 3 milliGRAM(s) Oral at bedtime PRN Insomnia      OBJECTIVE   Height (cm): 183.5 (08-26 @ 15:14)  Weight (kg): 76.7 (08-26 @ 15:14)  BMI (kg/m2): 22.8 (08-26 @ 15:14)  BSA (m2): 1.99 (08-26 @ 15:14)    T(F): 97.6 (08-27-22 @ 05:55), Max: 98.3 (08-26-22 @ 16:13)  HR: 109 (08-27-22 @ 05:55)  BP: 125/75 (08-27-22 @ 05:55)  RR: 20 (08-27-22 @ 05:55)  SpO2: 100% (08-27-22 @ 05:55)  Wt(kg): --    PHYSICAL EXAM   GENERAL: NAD on BiPAP  HEAD:  Atraumatic, Normocephalic  EYES: EOMI, PERRLA, conjunctiva and sclera clear  NECK: Supple, No JVD  CHEST/LUNG: Clear to auscultation bilaterally; No wheeze  HEART: Regular rate and rhythm; No murmurs, rubs, or gallops  ABDOMEN: Soft, Nontender, Nondistended; Bowel sounds present  EXTREMITIES:  2+ Peripheral Pulses, No clubbing, cyanosis, or edema  NEUROLOGY: non-focal  SKIN: No rashes or lesions                          7.1    9.55  )-----------( 232      ( 27 Aug 2022 05:02 )             22.7       08-27    141  |  100  |  102<H>  ----------------------------<  146<H>  3.7   |  26  |  1.84<H>    Ca    9.6      27 Aug 2022 05:04  Phos  4.0     08-27  Mg     1.9     08-27    TPro  7.8  /  Alb  3.9  /  TBili  3.5<H>  /  DBili  1.7<H>  /  AST  26  /  ALT  15  /  AlkPhos  125<H>  08-27      Magnesium, Serum: 1.9 mg/dL (08-27 @ 05:04)  Phosphorus Level, Serum: 4.0 mg/dL (08-27 @ 05:04)  Uric Acid, Serum: 6.0 mg/dL (08-27 @ 05:04)  Lactate Dehydrogenase, Serum: 843 U/L (08-27 @ 05:04)  Lactate Dehydrogenase, Serum: 868 U/L (08-26 @ 23:08)  Uric Acid, Serum: 6.4 mg/dL (08-26 @ 17:39)  Lactate Dehydrogenase, Serum: 1161 U/L (08-26 @ 17:39)  Magnesium, Serum: 1.8 mg/dL (08-26 @ 17:39)    RADIOLOGY & ADDITIONAL TESTING:  < from: Xray Chest 1 View- PORTABLE-Urgent (Xray Chest 1 View- PORTABLE-Urgent .) (08.26.22 @ 23:11) >  FINDINGS:    Overlying pacemaker limits evaluation. The study is redemonstrated   right-sided opacities and loculated effusion. Left costophrenic angle is   not imaged, limiting evaluation. Device overlies the heart. Heart size   not well evaluated. Left-sided cardiac device. No acute osseous findings.    IMPRESSION:    Similar right sided opacities and loculated pleural effusion.    ******PRELIMINARY REPORT******      ******PRELIMINARY REPORT******      ZAHRA HESTER MD; Resident Radiologist  This document is a PRELIMINARY interpretation and is pending final   attending approval. Aug 26 2022 11:01PM    < end of copied text >

## 2022-08-27 NOTE — H&P ADULT - PROBLEM SELECTOR PLAN 1
- p/w respiratory distress possibly 2/2 acute hemolytic anemia and HF exacerbation vs. ACS r/o  - Blood gas c/f respiratory acidosis ---> repeat VBG improved  - current VBG 08/27 0500: 7.41 (7.31)/ pCO2 42 (32)/HCO3 28 (16)/ La 1.6 from 3.5/1.8  - improving blood gas, wean off bipap 11 ipap/5 epap  - f/u TTE, repeat VBG when pt off bipap  - continue bipap until pt complets 3rd unit of pRBC - p/w respiratory distress possibly 2/2 acute hemolytic anemia and HF exacerbation vs. ACS r/o (pt with known chronic right sided loculated effusion)  - Blood gas c/f respiratory acidosis ---> repeat VBG improved  - current VBG 08/27 0500: 7.41 (7.31)/ pCO2 42 (32)/HCO3 28 (16)/ La 1.6 from 3.5/1.8  - improving blood gas, wean off bipap 11 ipap/5 epap  - f/u TTE, repeat VBG when pt off bipap  - continue bipap until pt complets 3rd unit of pRBC  - cautious with diuretics in setting of recent TLS

## 2022-08-27 NOTE — H&P ADULT - HISTORY OF PRESENT ILLNESS
73 yo with MHx significant for Atrial flutter (on Apixaban), HTN, here for further evaluation of low-level neutrophilia (since 1/2022) and lymphadenopathy. Previously NHL (around [id number]?). He received chemotherapy in 2004. 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 In 2010 he went to [place] and was treated for reoccurrence and was treated with bendamustine. He reports that he has had areas of swelling in his neck on and off for about 2 years which was mostly undergoing workup with his endocrinologist. In the last few months he has noticed increasing size of his left cervical LNs and a right nodule that caused swelling of his face, which has now spontaneously decreased in size significantly.    Today he reports he feels well outside of weight loss. He denies any other constitutional complaints. He weighed 192 lbs in 10/2021 which was down to 183 lbs in December 12/2021(2 months later) which has further decreased down to 179 lbs today. He has not felt himself masses outside of his neck region. On 5/14/22, he went for US duplex of his left lower extremity due to a "tangerine size lump" on the back of his left thigh, which noted a 4.4 cm hypoechoic mass in his left inguinal region without commenting on his perceived posterior thigh mass. Also, on 5/2/22 he underwent US of his thyroid and parathyroid glands where they noticed bilateral cervical lymph nodes with the largest on the left side measuring 2.1 x 1.6 x 1.9 cm and a right level 1 LN measuring 2.2 x 1.2 x 2.3 cm. They saw 3 lymph nodes on the left side and one discrete LN on the right.    He also recently just finished a 14 day course of Levofloxacin for an uncomplicated phenomena. He was having a dry cough and underwent imaging as an outpatient which found mild left and right pleural effusions, areas of consolidation on the right and retrocardiac airspace involvement (performed 5/2/22). He now feels better without infectious complaints.    In addition, he is currently anemic. He last had a colonoscopy in his recent memory. He had bleeding hemorrhoids last year treated with OTC medications. He denies any tick bites recently.     He also has MGUS with 3 separate monoclonal gammopathies I suspect is related to his NHL. He has M Judd 1 showing IgG Lambda at 0.3 g/dl, M Judd 2 showing IgG Kappa at 0.2 g/dl, M Judd 3 showing IgM Lambda (unable to quantitate). There is no suspicion for myeloma or waldenstroms at this time and his M-spikes will be monitored. He has no elevation in kappa/ lambda FLC ration, but individual light chains are both elevated, kappa at 10.77 mg/dL and lambda at 6.58 mg/dL.    =======================================================  Care Providers:    PMD: Dr Amari Hickman  Endo: Dr Carter Vigil  Cardiologist: Dr. Don [phone] fax [phone]    =======================================================    Vonnie (daughter): [phone] - takes all healthcare related calls.     Disease: Unknown, working up   Stage:. PENDING.   Pathology: Fine Needle Aspiration Report - Auth (Verified)  Specimen(s) Submitted  1. AXILLA, LEFT, US GUIDED CORE BIOPSY AND FNA  2. LYMPH NODE, CERVICAL, LEFT POSTERIOR, US GUIDED CORE BIOPSY AND FNA      Final Diagnosis  1. AXILLA, LEFT, US GUIDED CORE BIOPSY AND FNA  POSITIVE FOR MALIGNANT CELLS.  B-cell lymphoma of follicular center origin, most consistent with  follicular lymphoma, grade 3A. See note.    Fine Needle Aspiration Addendum Report - Auth (Verified)    Addendum  2. LYMPH NODE, CERVICAL, LEFT POSTERIOR, US GUIDED CORE BIOPSY AND FNA  POSITIVE FOR MALIGNANT CELLS.  B-cell lymphoma of follicular center origin with high proliferation index.  See note.    Note:  The neoplastic B cells demonstrate a follicular center B-cell phenotype with MUM-1 co-expression and a high proliferation index. Follicular dendritic meshwork is present in most areas of the biopsy, consistent  with follicular lymphoma. However, there is one focal area with increased larger cells and lack of follicular dendritic meshwork. Therefore, a high grade component can not be excluded. If clinically indicated, suggest excisional biopsy for definitive classification.    Immunohistochemical stains (CD3, CD5, CD10, CD20, CD21, CD23, CD30, BCL-6, BCL-2, cyclinD1, ki-67, c-MYC, PAX-5, MUM-1, p53, ISAURO) were performed on block 2C. The neoplastic cells are positive for CD20, PAX-5, BCL-6, BCL-2, MUM-1 (partial), dim p53; negative CD5, CD10, CD30, cyclinD1, ISAURO. CD21 and CD23 highlight follicular dendritic meshwork in most areas with focal absence of follicular dendritic meshwork.   Ki-67 proliferation index is approximately 60 to 70%. C-MYC stains approximately 20 to 30% of cells. CD3 and CD5 highlight some T-cells in the background.   Tumor/ Prognostic Markers: 6/14/22 FNA of Left axilla LN: FLUORESCENCE IN-SITU HYBRIDIZATION (FISH)  1. No evidence of MYC Rearrangement  2. No evidence of BCL2-IGH [translocation t(14;18)] gene rearrangement  3. 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        72 year old male with significant history of HTN, CKD stage II, A flutter (PPM in place), HLD, HFrEF (45% in 2019), and DLBCL (tx with R-CHOP in 2003, with relapse in 2009 treated with BR) now with recently diagnosed grade 3 follicular lymphoma on 8/23 who presents with significant anemia and SOB. The pt was pending a surgical biopsy on 8/25. However, pt was found to be in TLS with associated nausea and fatigue on 8/23; at this time rasburicase was started and lowered the uric acid from 13 to 6, however, pt developed rasburicase-triggered G6PD hemolytic anemia with associated jaundice and dark colored urine as well as hemoglobin of 5.7 and hypotension on 8/26 requiring further evaluation. In ED, the pt received up to 2 units of pRBC with improvement in hemoglobin to 6.1 from 5.7 s/p 1st unit and improvement to hgb 7.1 s/p 2nd unit. The pt was also hypotensive and tachypneic with respiratory acidosis. MICU was consulted at bedside and recommend caution dose of  cc bolus and albumin with slight improvement in MAPs to be > 65 mmHg. Regarding the pt's respiratory distress, he was escalated to BiPAP. Hospital course complicated by EKG with QTc prolongation and elevated troponin, magnesium repleted. Blood cultures were collected and in process. The pt completed was transferred to medicine floor once pt was HDS.     On evaluation, the pt states he is breathing "okay" while on the BiPAP, tolerated throughout the night. He denies any chest pain, increased swelling, or abdominal pain, N/V/D, hematochezia or hematuria at this time. Pt is tachypneic, but improved from arrival. Pt recommended to call pt's daughter, no answer. Reviewed pt's documentation.      Care Providers:    PMD: Dr Amari Hickman  Endo: Dr Carter Vigil  Cardiologist: Dr. Don [phone] fax [phone]    =======================================================    Vonnie (daughter): [phone] - takes all healthcare related calls.    72 year old male with significant history of HTN, CKD stage II, complete heart block (PPM in place), HLD, HFrEF (45% in 2019), and DLBCL (tx with R-CHOP in 2003, with relapse in 2009 treated with BR) now with recently diagnosed grade 3 follicular lymphoma on 8/23 who presents with anemia and SOB, patient was pending a surgical biopsy on 8/25. However, pt was found to be in TLS with associated nausea and fatigue on 8/23; at this time rasburicase was started and lowered the uric acid from 13 to 6, however, pt developed rasburicase-triggered G6PD hemolytic anemia with associated jaundice and dark colored urine as well as hemoglobin of 5.7 and hypotension on 8/26 requiring further evaluation. In ED, the pt received up to 2 units of pRBC with improvement in hemoglobin to 6.1 from 5.7 s/p 1st unit and improvement to hgb 7.1 s/p 2nd unit. The pt was also hypotensive and tachypneic with respiratory acidosis. MICU was consulted at bedside and recommend caution dose of  cc bolus and albumin with slight improvement in MAPs to be > 65 mmHg. Regarding the pt's respiratory distress, he was escalated to BiPAP. Hospital course complicated by EKG with QTc prolongation and elevated troponin, magnesium replaced, Blood cultures were collected and in process. The pt admitted to medicine, he reports improvement in his breathing on bipap, denies any chest pain, increased swelling, or abdominal pain, N/V/D, hematochezia or hematuria at this time. Pt is tachypneic, but improved from arrival. Pt recommended to call pt's daughter, no answer. Reviewed pt's documentation.      Care Providers:    PMD: Dr Amari Hickman  Endo: Dr Carter Vigil  Cardiologist: Dr. Don [phone] fax [phone]    =======================================================    Vonnie (daughter): [phone] - takes all healthcare related calls.

## 2022-08-27 NOTE — PROGRESS NOTE ADULT - PROBLEM SELECTOR PLAN 5
- p/w acute hypotension with MAPs < 65  - MICU consulted with recommendations to give albumin 5% 250 cc bolus, pt s/p  cc bolus (caution i/s/o HF)  - currently pt 100/60, MAP >65, HDS   - f/u repeat TTE   - CTM SBP, monitor MAP > 65 - p/w acute hypotension with MAPs < 65  - MICU consulted with recommendations to give albumin 5% 250 cc bolus, pt s/p  cc bolus (caution i/s/o HF)  - currently HDS  - f/u repeat TTE

## 2022-08-27 NOTE — PROGRESS NOTE ADULT - SUBJECTIVE AND OBJECTIVE BOX
Progress Note    08-27-22    Patient is a 72y old  Male who presents with a chief complaint of Hemolysis (27 Aug 2022 07:40)      Subjective / Overnight Events :  - No acute events overnight.  - Pt seen and examined at bedside. Patient with no concerns. Currently on BiPAP and notes that his breathing is better. Denies chest pain, palpitations, dizziness, headaches. Is able to go to the bathroom without issues, denies changes in stool color or urine color. No cricket blood with BM. Overnight POCUS performed.     Additional ROS (if any):    MEDICATIONS  (STANDING):  allopurinol 300 milliGRAM(s) Oral daily  atorvastatin 40 milliGRAM(s) Oral at bedtime  chlorhexidine 2% Cloths 1 Application(s) Topical daily    MEDICATIONS  (PRN):  melatonin 3 milliGRAM(s) Oral at bedtime PRN Insomnia          PHYSICAL EXAM:  Vital Signs Last 24 Hrs  T(C): 36.2 (27 Aug 2022 08:52), Max: 36.8 (26 Aug 2022 16:13)  T(F): 97.2 (27 Aug 2022 08:52), Max: 98.3 (26 Aug 2022 16:13)  HR: 95 (27 Aug 2022 08:52) (87 - 114)  BP: 114/72 (27 Aug 2022 08:52) (85/36 - 126/73)  BP(mean): 79 (27 Aug 2022 00:45) (50 - 79)  RR: 24 (27 Aug 2022 08:52) (16 - 32)  SpO2: 100% (27 Aug 2022 08:52) (96% - 100%)    Parameters below as of 27 Aug 2022 08:52  Patient On (Oxygen Delivery Method): BiPAP/CPAP        I&O's Summary    26 Aug 2022 07:01  -  27 Aug 2022 07:00  --------------------------------------------------------  IN: 0 mL / OUT: 350 mL / NET: -350 mL        General: NAD, non-toxic appearing. on BiPAP  HEENT: PERRLA, EOMi, no scleral icterus  CV: RRR, normal S1 and S2, no m/r/g  Lungs: unable to fully auscultate with BiPAP  Abd: soft, nontender, nondistended  Ext: no edema, 2+ peripheral pulses   Pysch: AAOx2, appropriate affect   Neuro: grossly non-focal, moving all extremities spontaneously   Skin: no rashes or lesions. Jaundiced    LABS:  CAPILLARY BLOOD GLUCOSE      POCT Blood Glucose.: 181 mg/dL (27 Aug 2022 07:31)                              7.1    9.55  )-----------( 232      ( 27 Aug 2022 05:02 )             22.7       WBC Trend: 9.55<--, 9.52<--, 11.11<--  Hb Trend: 7.1<--, 6.0<--, 5.7<--, 9.7<--    08-27    141  |  100  |  102<H>  ----------------------------<  146<H>  3.7   |  26  |  1.84<H>    Ca    9.6      27 Aug 2022 05:04  Phos  4.0     08-27  Mg     1.9     08-27    TPro  7.8  /  Alb  3.9  /  TBili  3.5<H>  /  DBili  1.7<H>  /  AST  26  /  ALT  15  /  AlkPhos  125<H>  08-27      CARDIAC MARKERS ( 27 Aug 2022 05:03 )  x     / x     / 87 U/L / x     / 5.5 ng/mL              RADIOLOGY & ADDITIONAL TESTS: Reviewed

## 2022-08-27 NOTE — PROGRESS NOTE ADULT - PROBLEM SELECTOR PLAN 9
- afib, hold ac  - v paced on tele  - CTM on tele - afib, on home AC  - v paced on tele  - restarted AC at Eliquis 5mg

## 2022-08-27 NOTE — CONSULT NOTE ADULT - ASSESSMENT
72M hx DLBCL (tx with R-CHOP in 2003, with relapse in 2009 treated with BR) now with multiple areas of palpable lymphadenopathy with biopsies shown to be at least grade IIIA follicular lymphoma. Has had significant weight loss >20lbs in the last 6 months. Also noted to have an IgG lambda, IgG Kappa, and IgM Lambda monoclonal gammopathies. Prior to being sent to the ED his labs were suggestive of TLS with UA 13.7 s/p 3mg rasburicase on 8/23.      #Follicular Lymphoma  #Anemia  PET/CT 8/2022 with FDG avid cervical, b/l supraclavicular, chest and a few abdominal LNs, intramuscular masses in the left posterior chest and left upper thigh, scattered FDG avid subcutaneous soft tissue nodules with trace right pleural effusion and moderate left pleural effusion with a loculated fluid in the RML, splenomegaly. There were areas of > 20 SUV on the PET-CT, repeat whole lymph node biopsy was requested to confirm suspected diagnosis of Grade 3B FL or transformed large cell lymphoma however patient presented with evidence of TLS and now with nausea/fatigue sent to the hospital for further evaluation. s/p biopsy of both cervical and left axillary LNs which showed grade 3A follicular lymphoma positive for BCL6 (3q27) breakpoint translocation and negative for MYC Rearrangement or BCL2-IGH gene rearrangement [translocation t(14;18) present in ~ 85% of FL].     - outpatient had evidence of TLS with labs outpatient UA 13.7   - G6PD resulted 4.8  - likely has rasburicase related hemolysis, would transfuse to keep Hb >8   - c/w allopurinol 300mg daily  - IVF   - Trend CBC, CMP, hemolysis labs daily  - Methemoglobin wnl 0.2%, no need for vitamin C  - if nontoxic appearing and otherwise stable plan for steroids (100mg prednisone daily x5 days along with inpatient chemotherapy with O-COEP.    ***Note is incomplete. Will discuss plan with attending.     Blayne Barnett MD  Hematology/Oncology Fellow PGY-4  Pager: [facility] [phone] / [place] [phone]   After 5pm and on weekends please page on-call fellow  72M hx DLBCL (tx with R-CHOP in 2003, with relapse in 2009 treated with BR) now with multiple areas of palpable lymphadenopathy with biopsies shown to be at least grade IIIA follicular lymphoma. Has had significant weight loss >20lbs in the last 6 months. Also noted to have an IgG lambda, IgG Kappa, and IgM Lambda monoclonal gammopathies. Prior to being sent to the ED his labs were suggestive of TLS with UA 13.7 s/p 3mg rasburicase on 8/23.      #Follicular Lymphoma  #Hemolytic Anemia  PET/CT 8/2022 with FDG avid cervical, b/l supraclavicular, chest and a few abdominal LNs, intramuscular masses in the left posterior chest and left upper thigh, scattered FDG avid subcutaneous soft tissue nodules with trace right pleural effusion and moderate left pleural effusion with a loculated fluid in the RML, splenomegaly. There were areas of > 20 SUV on the PET-CT, repeat whole lymph node biopsy was requested to confirm suspected diagnosis of Grade 3B FL or transformed large cell lymphoma however patient presented with evidence of TLS and now with nausea/fatigue sent to the hospital for further evaluation. s/p biopsy of both cervical and left axillary LNs which showed grade 3A follicular lymphoma positive for BCL6 (3q27) breakpoint translocation and negative for MYC Rearrangement or BCL2-IGH gene rearrangement [translocation t(14;18) present in ~ 85% of FL].     - outpatient had evidence of TLS with labs outpatient UA 13.7. G6PD resulted 4.8  - Initial , indirect bili 1.8, hapto < 20  - Peripheral smear being prepared, to be reviewed  - likely has rasburicase related hemolysis, would transfuse to keep Hb >8   - Due to renal function, please reduce allopurinol to 100mg qd  - IVF   - Trend CBC, CMP, hemolysis labs daily  - Methemoglobin wnl 0.2%, no need for vitamin C  - if infectious workup negative, nontoxic appearing and otherwise stable plan for steroids (100mg prednisone daily x5 days along with inpatient chemotherapy with O-COEP.    Blayne Barnett MD  Hematology/Oncology Fellow PGY-4  Pager: [facility] [phone] / [place] [phone]   After 5pm and on weekends please page on-call fellow

## 2022-08-27 NOTE — H&P ADULT - PROBLEM SELECTOR PLAN 3
- EKG with prolonged QTc 500s from 600s   - pending repeat EKG  - troponin 225 at 8/26 2300  - no CP at this time, elevated troponin possibly 2/2 to demand ischemia   - f/u repeat troponin, cardiac enzymes

## 2022-08-27 NOTE — CONSULT NOTE ADULT - ATTENDING COMMENTS
Patient was seen independently and discussed with fellow over the phone.      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  From a CM standpoint, he has had NICM since 2016 from our records likely possible 2/2 chemotherapy exposure.  Echocardiogram reviewed from today.  LV function appears similar to prior or mildly reduced.  However, even on prior echos the inferior wall is hypokinetic in some views.  He has known non obstructive LCX CAD (60% distal on cath in 2019). On exam his JVP is elevated to the mandible with 1+ pitting edema.  He received a large number of blood products in the past 24 hours.  Would start on diuresis with bumex 1mg IV BID and afterload reduction with hydralazine low dose 10mg TID.  He will likely need to have his beta blocker resumed to avoid tachycardia.  Can use low dose metoprolol Po instead of carvedilol.  Cardiac output on echocardiogram around 3 with CI 1.7.   May need to consider repeat ischemic evaluation on this admission.  However, the risks of PCI would need to be weighed against bleeding risks as he would have be to be on DAPT and is already on AC. Patient was seen independently and discussed with fellow over the phone.      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  From a CM standpoint, he has had NICM since 2016 from our records likely possible 2/2 chemotherapy exposure.  Echocardiogram reviewed from today.  LV function appears similar to prior or mildly reduced.  However, even on prior echos the inferior wall is hypokinetic in some views.  MR is still severe.  He has known non obstructive LCX CAD (60% distal on cath in 2019). On exam his JVP is elevated to the mandible with 1+ pitting edema.  He received a large number of blood products in the past 24 hours.  Would start on diuresis with bumex 1mg IV BID and afterload reduction with hydralazine low dose 10mg TID.  He will likely need to have his beta blocker resumed to avoid tachycardia.  Can use low dose metoprolol Po instead of carvedilol.  Cardiac output on echocardiogram around 3 with CI 1.7.   May need to consider repeat non-urgent ischemic evaluation on this admission.  He is chest pain free currently with flat troponin trend.    However, the risks of PCI would need to be weighed against bleeding risks as he would have be to be on DAPT and is already on AC.

## 2022-08-27 NOTE — PROGRESS NOTE ADULT - PROBLEM SELECTOR PLAN 8
- h/o grade 3 follicular lymphoma, pt was pending surgical biopsy on 8/23, however in heme/onc outpt labs s/f TLS with uric acid 13.7, pt was started rasburicase on 8/23 and developed rasburicase-triggered G6PD hemolysis with hgb 5.7 on 8/26  - UA 6.4 from 13.7 s/p rasburicase on 8/23  - trend hemolysis labs   - per heme recommendations 8/26:   -- continue with allopurinol daily   - follow-up with heme on 8/27      - pending recs - h/o grade 3 follicular lymphoma, pt was pending surgical biopsy on 8/23, however in heme/onc outpt labs s/f TLS with uric acid 13.7, pt was started rasburicase on 8/23 and developed rasburicase-triggered G6PD hemolysis with hgb 5.7 on 8/26  - UA 6.4 from 13.7 s/p rasburicase on 8/23  - trend hemolysis labs   - per heme recommendations 8/26:   -- continue with allopurinol daily   - follow-up with heme on 8/27      - "if infectious workup negative, nontoxic appearing and otherwise stable plan for steroids (100mg prednisone daily x5 days along with inpatient chemotherapy with O-COEP"

## 2022-08-27 NOTE — CONSULT NOTE ADULT - ATTENDING COMMENTS
72-yr-old man with h/o relapse DLBCL (initial dx in 2003 Tx R-CHOP, relapse in 2009, tx BR) in [place], recently presented with LAD and B-symptoms found to have FL grade 3A. Patient admitted with spontaneous TLS, received rasburicase and developed hemolysis due to G6PD deficiency. Continue transfusion support, renal dose Allopurinol. Caution with infusion (patient has HFrEF). Monitor Hemolysis and TLS labs.  He is planned for O-OCEP when stable. NOTE: Please obtain path report for 2009 relapse LN biopsy. It appears that the patient had FL in 2009.

## 2022-08-27 NOTE — PROGRESS NOTE ADULT - PROBLEM SELECTOR PLAN 7
- h/o HFrEF EF 45%, diffuse LV hypokinesis, mod pulmHTN; followed by cardiology outpt  - p/w ARGUETA, on bipap, imaging c/f pleural effusion  - proBNP 67k compared to 28K in 2019   - MICU bedside pocus w/diffuse B lines and pleural effusion  - troponin 225 -> 243  - f/u TTE, compare to 2019  - continue to hold carvedilol, f/u cardiology in AM

## 2022-08-27 NOTE — CONSULT NOTE ADULT - PROBLEM SELECTOR RECOMMENDATION 9
Patient with notable respiratory distress which could be 2/2 anemia vs HF exacerbation.   -Recommend echo, had hx of severe MR which improved with CRT upgrade   -Was on torsemide at home which patient can continue for maintenance   -Holding lisinopril and carvedilol at this time given hypotensino Patient with notable respiratory distress which could be 2/2 anemia vs HF exacerbation.   -Recommend echo, had hx of severe MR which improved with CRT upgrade   -Was on torsemide at home.  Switch to bumex 1mg IV BID.   - Holding lisinopril in setting of poor renal function.   - start on hydralazine 5 or 10mg TID for afterload reduction.

## 2022-08-27 NOTE — H&P ADULT - PROBLEM SELECTOR PLAN 5
- possibly i/s/o medication induced QT prolongation  - EKG 8/26 1638, rate 100 bpm,  ms, QTc 691 ms, ventricular paced  - EKG 8/26 2100, rate 94 bpm, Ekaterina 156, , QTc 547 ms, ventricular paced   - f/u repeat EKG 8/27 0500  - f/u repeat troponin  - f/u repeat mag

## 2022-08-27 NOTE — PROGRESS NOTE ADULT - ATTENDING COMMENTS
73yo M w/ extensive PMHx including DLBCL, G6PD deficiency, complete heart block s/p PPM, HFrEF (45%), CKD presents with anemia, pt was treated for TLS syndrome w/ rasburicase on 8/23 and current episode concerning for an acute hemolytic anemia in setting of G6PD deficiency, pt appears clinically euvolemic at this time and reports breathing comfortably downtitrated from Bipap to NC.     #Hemolytic anemia  -s/p transfusion, counts stable, continue to trend CBC  -Downtrending hemolysis labs  -G6PD level pending  -can restart A/C (eliquis)  -heme following    #TLS  -daily TLS labs  -c/w IVF, start diuresis given volume and concurrent heart failure.   -renally dose allopurinol    #HF  -Cards following  -Low dose afterload reduction w/ hydral 10 TID PO  -pt on coreg at home, will put on low dose metop to prevent rebound tachy per cards  -may need ischemic workup prior to dc, f/u with cards  -echo  -serial EKGs       Milena Beth MD  Division of Hospital Medicine  Pager: [phone] or reachable on MS Teams  After hours please page [phone]

## 2022-08-27 NOTE — H&P ADULT - PROBLEM SELECTOR PLAN 2
- p/w G6PD hemolysis (low hapto, high LDH) s/p rasburicase for TLS on 8/23 w/hgb 5 on 8/26 (hgb 9 on 8/23)  - d/c rasburicase immediately (avoid G6PD interaction)  - pt s/p 2u pRBC 8/26, post transfusion CBC s/p 1st unity = 6; s/p 2nd unit = 7  - currently receiving 3rd unit on 8/27 at 0400, goal hgb > 8 per heme onc   - f/u post-transfusion cbc at 0800 to reflect 3rd unit   - f/u repeat G6PD, uric acid, T+S    - please avoid the following medications: rasburicase, ß-Naphthol, Niridazole, Stibophen, Nitrofurans, Nitrofurantoin, Nitrofurazone, Quinolones, Ciprofloxacin, Moxifloxacin, Chloramphenicol, Sulfonamides, Sulfamethoxazole +Trimethoprim, Sulfasalazine, Methylene blue, Dapsone, Para-aminosalicylic acid, Doxorubicin, Rasburicase*, Phenazopyridine, acetominophen, vitamin C - p/w G6PD hemolysis (low hapto, high LDH/alk/bili) s/p rasburicase for TLS on 8/23 w/hgb 5 on 8/26 (hgb 9 on 8/23)  - d/c rasburicase immediately (avoid G6PD interaction)  - pt s/p 2u pRBC 8/26, post transfusion CBC s/p 1st unity = 6; s/p 2nd unit = 7  - currently receiving 3rd unit prbc on 8/27 at 0400 - 0700am, goal hgb > 8 per heme onc   - f/u post-transfusion cbc at 0800 to reflect 3rd unit   - f/u repeat G6PD, uric acid, T+S, ldh, hapto, bili, cbc, cmp      - please avoid the following medications: rasburicase, ß-Naphthol, Niridazole, Stibophen, Nitrofurans, Nitrofurantoin, Nitrofurazone, Quinolones, Ciprofloxacin, Moxifloxacin, Chloramphenicol, Sulfonamides, Sulfamethoxazole +Trimethoprim, Sulfasalazine, Methylene blue, Dapsone, Para-aminosalicylic acid, Doxorubicin, Rasburicase*, Phenazopyridine, acetominophen, vitamin C - p/w G6PD hemolysis (low hapto, high LDH/alk/bili) s/p rasburicase for TLS on 8/23 w/hgb 5 on 8/26 (hgb 9 on 8/23)  - d/c rasburicase in setting of G6PD  - pt s/p 2u pRBC 8/26, post transfusion CBC s/p 1st unity = 6; s/p 2nd unit = 7  - currently receiving 3rd unit prbc on 8/27 at 0400 - 0700am, goal hgb > 8 per heme onc   - f/u post-transfusion cbc at 0800 to reflect 3rd unit   - f/u repeat G6PD, uric acid, T+S, ldh, hapto, bili, cbc, cmp

## 2022-08-27 NOTE — CONSULT NOTE ADULT - PROBLEM SELECTOR RECOMMENDATION 3
[facility] DIVISION OF KIDNEY DISEASES AND HYPERTENSION -- FOLLOW UP NOTE  --------------------------------------------------------------------------------  Chief Complaint: obstructive uropathy    24 hour events/subjective:  Pt seen and examined this AM  BL Hydronephrosis  Bourne in place; good UOP; bloody; no clots  HGB drop; pt refusing blood transfusion;    PAST HISTORY  --------------------------------------------------------------------------------  No significant changes to PMH, PSH, FHx, SHx, unless otherwise noted    ALLERGIES & MEDICATIONS  --------------------------------------------------------------------------------  Allergies    amoxicillin (Rash)    Intolerances      Standing Inpatient Medications  atorvastatin 20 milliGRAM(s) Oral at bedtime  dextrose 5%. 1000 milliLiter(s) IV Continuous <Continuous>  dextrose 50% Injectable 12.5 Gram(s) IV Push once  dextrose 50% Injectable 25 Gram(s) IV Push once  dextrose 50% Injectable 25 Gram(s) IV Push once  epoetin daniel Injectable 50087 Unit(s) SubCutaneous daily  folic acid 1 milliGRAM(s) Oral daily  insulin lispro (HumaLOG) corrective regimen sliding scale   SubCutaneous three times a day before meals  iron sucrose IVPB 200 milliGRAM(s) IV Intermittent every 24 hours  polyethylene glycol 3350 17 Gram(s) Oral daily  sodium bicarbonate  Infusion 0.172 mEq/kG/Hr IV Continuous <Continuous>  tamsulosin 0.8 milliGRAM(s) Oral at bedtime    PRN Inpatient Medications  acetaminophen   Tablet 650 milliGRAM(s) Oral every 6 hours PRN  acetaminophen   Tablet. 650 milliGRAM(s) Oral every 6 hours PRN  dextrose Gel 1 Dose(s) Oral once PRN  glucagon  Injectable 1 milliGRAM(s) IntraMuscular once PRN  morphine  - Injectable 2 milliGRAM(s) IV Push every 4 hours PRN  oxyCODONE    5 mG/acetaminophen 325 mG 1 Tablet(s) Oral every 4 hours PRN      REVIEW OF SYSTEMS  --------------------------------------------------------------------------------  Gen: No weight changes, fatigue, fevers/chills, weakness  Skin: No rashes  Head/Eyes/Ears/Mouth: No headache; Normal hearing; Normal vision w/o blurriness; No sinus pain/discomfort, sore throat  Respiratory: No dyspnea, cough, wheezing, hemoptysis  CV: No chest pain, PND, orthopnea  GI: No abdominal pain, diarrhea, constipation, nausea, vomiting, melena, hematochezia  : No increased frequency, dysuria, +hematuria, no nocturia  MSK: No joint pain/swelling; no back pain; no edema  Neuro: No dizziness/lightheadedness, weakness, seizures, numbness, tingling  Heme: No easy bruising or bleeding  Endo: No heat/cold intolerance  Psych: No significant nervousness, anxiety, stress, depression    All other systems were reviewed and are negative, except as noted.    VITALS/PHYSICAL EXAM  --------------------------------------------------------------------------------  T(C): 36.4 (04-05-18 @ 23:21), Max: 36.7 (04-05-18 @ 20:00)  HR: 88 (04-05-18 @ 23:21) (82 - 88)  BP: 105/60 (04-05-18 @ 23:21) (105/60 - 135/79)  RR: 19 (04-05-18 @ 23:21) (18 - 19)  SpO2: 100% (04-05-18 @ 23:21) (100% - 100%)  Wt(kg): --        04-05-18 @ 07:01  -  04-06-18 @ 07:00  --------------------------------------------------------  IN: 0 mL / OUT: 2850 mL / NET: -2850 mL      Physical Exam:  	Gen: NAD, chronically ill appearing  	HEENT: PERRL, supple neck, clear oropharynx  	Pulm: CTA B/L  	CV: RRR, S1S2; no rub  	Back: No spinal or CVA tenderness; no sacral edema  	Abd: +BS, soft, nontender/nondistended  	: + bourne  	UE: Warm, FROM, no clubbing, intact strength; no edema; no asterixis  	LE: Warm, FROM, no clubbing, intact strength; no edema  	Neuro: No focal deficits, intact gait  	Psych: Normal affect and mood  	Skin: Warm, without rashes      LABS/STUDIES  --------------------------------------------------------------------------------              6.2    8.6   >-----------<  223      [04-06-18 @ 07:02]              20.4     141  |  105  |  38.0  ----------------------------<  150      [04-06-18 @ 07:02]  4.1   |  24.0  |  2.49        Ca     8.2     [04-06-18 @ 07:02]      Mg     1.9     [04-06-18 @ 07:02]            Creatinine Trend:  SCr 2.49 [04-06 @ 07:02]  SCr 2.43 [04-05 @ 07:49]  SCr 2.54 [04-04 @ 07:17]  SCr 2.55 [04-03 @ 04:44]    Urinalysis - [04-03-18 @ 04:22]      Color Yellow / Appearance Clear / SG 1.010 / pH 6.0      Gluc Negative / Ketone Negative  / Bili Negative / Urobili Negative       Blood Moderate / Protein 30 / Leuk Est Moderate / Nitrite Negative      RBC 0-2 / WBC >50 / Hyaline  / Gran  / Sq Epi  / Non Sq Epi Negative / Bacteria Occasional    Urine Creatinine 64      [04-03-18 @ 08:22]  Urine Protein 51.0      [04-03-18 @ 08:22]  Urine Sodium 45      [04-03-18 @ 08:22]  Urine Chloride 31      [04-03-18 @ 08:22]  Urine Osmolality 297      [04-03-18 @ 08:23]    Iron 23, TIBC 466, %sat 5      [04-03-18 @ 11:56]  Ferritin 17      [04-03-18 @ 11:56]  HbA1c 7.3      [04-04-18 @ 07:17]  TSH 3.59      [04-05-18 @ 07:49]  Lipid: chol 124, TG 95, HDL 27, LDL 78      [04-04-18 @ 07:17] Patient has hx of flutter/fib and Atach on apixaban and recently started on ivabridine given his Atach on his CRT interrogation   -Holding carvedilol at this time given his hypotension  -Would continue apixaban if no contraindcation per primary team Patient has hx of flutter/fib and Atach on apixaban and recently started on ivabridine given his Atach on his CRT interrogation but insurance did not cover  -Holding carvedilol at this time given his hypotension  -Would continue apixaban if no contraindcation per primary team Patient has hx of flutter/fib and Atach on apixaban and recently started on ivabridine given his Atach on his CRT interrogation but insurance did not cover  -Holding carvedilol at this time given his hypotension  -Would continue apixaban if no contraindication per primary team

## 2022-08-27 NOTE — H&P ADULT - ATTENDING COMMENTS
71yo M w/ extensive PMHx including DLBCL, G6PD deficiency, complete heart block s/p PPM, HFrEF (45%), CKD presents with anemia, pt was treated for TLS syndrome w/ rasburicase on 8/23 and current episode concerning for an acute hemolytic anemia in setting of G6PD deficiency, pt appears clinically euvolemic at this time and reports breathing comfortably on BiPAP, appreciate hematology recommendations, transfuse >8, will need to be cautious with fluids/diuresis as has concurrent TLS and HFrEF, uric acid today improved from prior, cardiac medications were held due to hypotension, can resume if blood pressure improves, has known right sided loculated pleural effusion, rest of plan as outlined above

## 2022-08-27 NOTE — CONSULT NOTE ADULT - SUBJECTIVE AND OBJECTIVE BOX
Patient seen and evaluated at bedside    Chief Complaint:    HPI:  72 year old male with significant history of HTN, CKD stage II, complete heart block (PPM in place), HLD, HFrEF (45% in 2019), and DLBCL (tx with R-CHOP in 2003, with relapse in 2009 treated with BR) now with recently diagnosed grade 3 follicular lymphoma on 8/23 who presents with anemia and SOB, patient was pending a surgical biopsy on 8/25. However, pt was found to be in TLS with associated nausea and fatigue on 8/23; at this time rasburicase was started and lowered the uric acid from 13 to 6, however, pt developed rasburicase-triggered G6PD hemolytic anemia with associated jaundice and dark colored urine as well as hemoglobin of 5.7 and hypotension on 8/26 requiring further evaluation. In ED, the pt received up to 2 units of pRBC with improvement in hemoglobin to 6.1 from 5.7 s/p 1st unit and improvement to hgb 7.1 s/p 2nd unit. The pt was also hypotensive and tachypneic with respiratory acidosis. MICU was consulted at bedside and recommend caution dose of  cc bolus and albumin with slight improvement in MAPs to be > 65 mmHg. Regarding the pt's respiratory distress, he was escalated to BiPAP. Hospital course complicated by EKG with QTc prolongation and elevated troponin, magnesium replaced, Blood cultures were collected and in process. The pt admitted to medicine, he reports improvement in his breathing on bipap, denies any chest pain, increased swelling, or abdominal pain, N/V/D, hematochezia or hematuria at this time. Pt is tachypneic, but improved from arrival. Pt recommended to call pt's daughter, no answer. Reviewed pt's documentation.    71 yo M w/ PMH CHB s/p PPM upgraded to CRT-P, HFrEF 2/2 chemo, DLBCL s/p chemo, HTN, non-obsturcitve CAD, Aflutter on apixaban who presents w/ SOB and anemia. 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 He was started on rasburicase for TLS but with notable G6PD hemolytics anemia. Was told to come into the ED and given 3u PRBC in total so far with improvement of anemia. He was also started on BiPAP given his tachypnea. He currently denies any SOB, chest pain, palpitations, lightheadedness, dizziness.     EKG shows A sensed V pacing       Care Providers:    PMD: Dr Amari Hickman  Endo: Dr Carter Vigil  Cardiologist: Dr. Don [phone] fax [phone]    =======================================================    Vonnie (daughter): [phone] - takes all healthcare related calls.    (27 Aug 2022 03:15)      PMHx:   Non-Hodgkins Lymphoma    Diabetes Mellitus    DM type 2 (diabetes mellitus, type 2)    Essential hypertension    Rhinitis, allergic    Systolic heart failure    Moderate mitral regurgitation    Pleural effusion    Atrial flutter, unspecified type    Impaired memory        PSHx:   No significant past surgical history    Non-Hodgkin lymphoma    Cardiac pacemaker        Allergies:  No Known Allergies      Home Meds:    Current Medications:   allopurinol 300 milliGRAM(s) Oral daily  atorvastatin 40 milliGRAM(s) Oral at bedtime  chlorhexidine 2% Cloths 1 Application(s) Topical daily  melatonin 3 milliGRAM(s) Oral at bedtime PRN      FAMILY HISTORY:  Family history of CHF (congestive heart failure)  Mother    Family history of non-Hodgkin&#x27;s lymphoma (Sibling)        Social History:  Smoking History:  Alcohol Use:  Drug Use:    REVIEW OF SYSTEMS:  Constitutional:     [x ] negative [ ] fevers [ ] chills [ ] weight loss [ ] weight gain  HEENT:                  [x ] negative [ ] dry eyes [ ] eye irritation [ ] postnasal drip [ ] nasal congestion  CV:                         [ x] negative  [ ] chest pain [ ] orthopnea [ ] palpitations [ ] murmur  Resp:                     [x ] negative [ ] cough [ ] shortness of breath [ ] dyspnea [ ] wheezing [ ] sputum [ ]hemoptysis  GI:                          [ x] negative [ ] nausea [ ] vomiting [ ] diarrhea [ ] constipation [ ] abd pain [ ] dysphagia   :                        [ x] negative [ ] dysuria [ ] nocturia [ ] hematuria [ ] increased urinary frequency  Musculoskeletal: [x ] negative [ ] back pain [ ] myalgias [ ] arthralgias [ ] fracture  Skin:                       [ x] negative [ ] rash [ ] itch  Neurological:        [ x] negative [ ] headache [ ] dizziness [ ] syncope [ ] weakness [ ] numbness  Psychiatric:           [ x] negative [ ] anxiety [ ] depression  Endocrine:            [ x] negative [ ] diabetes [ ] thyroid problem  Heme/Lymph:      [ x] negative [ ] anemia [ ] bleeding problem  Allergic/Immune: [ x] negative [ ] itchy eyes [ ] nasal discharge [ ] hives [ ] angioedema    [ x] All other systems negative  [ ] Unable to assess ROS due to      Physical Exam:  T(F): 97.5 (08-27), Max: 98.3 (08-26)  HR: 97 (08-27) (87 - 114)  BP: 119/76 (08-27) (85/36 - 126/73)  RR: 20 (08-27)  SpO2: 100% (08-27)  GENERAL: On BIPAP   ENT: No JVD appreciated   CHEST/LUNG: Clear to auscultation bilaterally; No wheeze, equal breath sounds bilaterally   HEART: Regular rate and rhythm; No murmurs, rubs, or gallops  EXTREMITIES:  No clubbing, cyanosis, or edema  PSYCH: Nl behavior, nl affect  NEUROLOGY: AAOx3, non-focal, cranial nerves intact  SKIN: Normal color, No rashes or lesions  LINES:    Cardiovascular Diagnostic Testing:    ECG: Personally reviewed:    Echo: Personally reviewed:    Stress Testing:    Cath:    Imaging:    CXR: Personally reviewed    Labs: Personally reviewed                        7.1    9.55  )-----------( 232      ( 27 Aug 2022 05:02 )             22.7     08-27    141  |  100  |  102<H>  ----------------------------<  146<H>  3.7   |  26  |  1.84<H>    Ca    9.6      27 Aug 2022 05:04  Phos  4.0     08-27  Mg     1.9     08-27    TPro  7.8  /  Alb  3.9  /  TBili  3.5<H>  /  DBili  1.7<H>  /  AST  26  /  ALT  15  /  AlkPhos  125<H>  08-27      Serum Pro-Brain Natriuretic Peptide: [id number] pg/mL (08-26 @ 17:39)         HEART FAILURE CONSULT NOTE    Patient seen and evaluated at bedside    Chief Complaint:    HPI:  72 year old male with significant history of HTN, CKD stage II, complete heart block (PPM in place), HLD, HFrEF (45% in 2019), and DLBCL (tx with R-CHOP in 2003, with relapse in 2009 treated with BR) now with recently diagnosed grade 3 follicular lymphoma on 8/23 who presents with anemia and SOB, patient was pending a surgical biopsy on 8/25. However, pt was found to be in TLS with associated nausea and fatigue on 8/23; at this time rasburicase was started and lowered the uric acid from 13 to 6, however, pt developed rasburicase-triggered G6PD hemolytic anemia with associated jaundice and dark colored urine as well as hemoglobin of 5.7 and hypotension on 8/26 requiring further evaluation. In ED, the pt received up to 2 units of pRBC with improvement in hemoglobin to 6.1 from 5.7 s/p 1st unit and improvement to hgb 7.1 s/p 2nd unit. The pt was also hypotensive and tachypneic with respiratory acidosis. MICU was consulted at bedside and recommend caution dose of  cc bolus and albumin with slight improvement in MAPs to be > 65 mmHg. Regarding the pt's respiratory distress, he was escalated to BiPAP. Hospital course complicated by EKG with QTc prolongation and elevated troponin, magnesium replaced, Blood cultures were collected and in process. The pt admitted to medicine, he reports improvement in his breathing on bipap, denies any chest pain, increased swelling, or abdominal pain, N/V/D, hematochezia or hematuria at this time. Pt is tachypneic, but improved from arrival. Pt recommended to call pt's daughter, no answer. Reviewed pt's documentation.    73 yo M w/ PMH CHB s/p PPM upgraded to CRT-P, HFrEF 2/2 chemo, DLBCL s/p chemo, HTN, non-obsturcitve CAD, Aflutter on apixaban who presents w/ SOB and anemia. 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 He was started on rasburicase for TLS but with notable G6PD hemolytics anemia. Was told to come into the ED and given 3u PRBC in total so far with improvement of anemia. He was also started on BiPAP given his tachypnea. He currently denies any SOB, chest pain, palpitations, lightheadedness, dizziness.     EKG shows A sensed V pacing     Care Providers:  PMD: Dr Amari Hickman  Endo: Dr Catrer Vigil  Cardiologist: Dr. Don [phone] fax [phone]  =======================================================    Vonnie (daughter): [phone] - takes all healthcare related calls.    (27 Aug 2022 03:15)    PMHx:   Non-Hodgkins Lymphoma  DM type 2 (diabetes mellitus, type 2)  Essential hypertension  Rhinitis, allergic  Systolic heart failure  Moderate mitral regurgitation  Pleural effusion  Atrial flutter, unspecified type  Impaired memory    PSHx:   No significant past surgical history  Non-Hodgkin lymphoma  Cardiac pacemaker    Allergies:  No Known Allergies    Current Medications:   allopurinol 300 milliGRAM(s) Oral daily  atorvastatin 40 milliGRAM(s) Oral at bedtime  chlorhexidine 2% Cloths 1 Application(s) Topical daily  melatonin 3 milliGRAM(s) Oral at bedtime PRN      FAMILY HISTORY:  Family history of CHF (congestive heart failure)  Mother  Family history of non-Hodgkin's lymphoma (Sibling)      REVIEW OF SYSTEMS:  Constitutional:     [x ] negative [ ] fevers [ ] chills [ ] weight loss [ ] weight gain  HEENT:                  [x ] negative [ ] dry eyes [ ] eye irritation [ ] postnasal drip [ ] nasal congestion  CV:                         [ x] negative  [ ] chest pain [ ] orthopnea [ ] palpitations [ ] murmur  Resp:                     [x ] negative [ ] cough [ ] shortness of breath [ ] dyspnea [ ] wheezing [ ] sputum [ ]hemoptysis  GI:                          [ x] negative [ ] nausea [ ] vomiting [ ] diarrhea [ ] constipation [ ] abd pain [ ] dysphagia   :                        [ x] negative [ ] dysuria [ ] nocturia [ ] hematuria [ ] increased urinary frequency  Musculoskeletal: [x ] negative [ ] back pain [ ] myalgias [ ] arthralgias [ ] fracture  Skin:                       [ x] negative [ ] rash [ ] itch  Neurological:        [ x] negative [ ] headache [ ] dizziness [ ] syncope [ ] weakness [ ] numbness  Psychiatric:           [ x] negative [ ] anxiety [ ] depression  Endocrine:            [ x] negative [ ] diabetes [ ] thyroid problem  Heme/Lymph:      [ x] negative [ ] anemia [ ] bleeding problem  Allergic/Immune: [ x] negative [ ] itchy eyes [ ] nasal discharge [ ] hives [ ] angioedema    [ x] All other systems negative  [ ] Unable to assess ROS due to      Physical Exam:  T(F): 97.5 (08-27), Max: 98.3 (08-26)  HR: 97 (08-27) (87 - 114)  BP: 119/76 (08-27) (85/36 - 126/73)  RR: 20 (08-27)  SpO2: 100% (08-27)  GENERAL: On BIPAP   ENT: JVP elevated to mandible  CHEST/LUNG: Clear to auscultation bilaterally; No wheeze, equal breath sounds bilaterally   HEART: Regular rate and rhythm; No murmurs, rubs, or gallops  EXTREMITIES:  No clubbing, cyanosis, or edema  PSYCH: Nl behavior, nl affect  NEUROLOGY: alert      Labs:                         7.1    9.55  )-----------( 232      ( 27 Aug 2022 05:02 )             22.7     08-27    141  |  100  |  102<H>  ----------------------------<  146<H>  3.7   |  26  |  1.84<H>    Ca    9.6      27 Aug 2022 05:04  Phos  4.0     08-27  Mg     1.9     08-27    TPro  7.8  /  Alb  3.9  /  TBili  3.5<H>  /  DBili  1.7<H>  /  AST  26  /  ALT  15  /  AlkPhos  125<H>  08-27      Serum Pro-Brain Natriuretic Peptide: [id number] pg/mL (08-26 @ 17:39)

## 2022-08-28 LAB
ALBUMIN SERPL ELPH-MCNC: 3.6 G/DL — SIGNIFICANT CHANGE UP (ref 3.3–5)
ALP SERPL-CCNC: 121 U/L — HIGH (ref 40–120)
ALT FLD-CCNC: 13 U/L — SIGNIFICANT CHANGE UP (ref 10–45)
ANION GAP SERPL CALC-SCNC: 14 MMOL/L — SIGNIFICANT CHANGE UP (ref 5–17)
ANION GAP SERPL CALC-SCNC: 15 MMOL/L — SIGNIFICANT CHANGE UP (ref 5–17)
AST SERPL-CCNC: 20 U/L — SIGNIFICANT CHANGE UP (ref 10–40)
BILIRUB SERPL-MCNC: 2.5 MG/DL — HIGH (ref 0.2–1.2)
BUN SERPL-MCNC: 101 MG/DL — HIGH (ref 7–23)
BUN SERPL-MCNC: 103 MG/DL — HIGH (ref 7–23)
CALCIUM SERPL-MCNC: 9.2 MG/DL — SIGNIFICANT CHANGE UP (ref 8.4–10.5)
CALCIUM SERPL-MCNC: 9.5 MG/DL — SIGNIFICANT CHANGE UP (ref 8.4–10.5)
CHLORIDE SERPL-SCNC: 99 MMOL/L — SIGNIFICANT CHANGE UP (ref 96–108)
CHLORIDE SERPL-SCNC: 99 MMOL/L — SIGNIFICANT CHANGE UP (ref 96–108)
CO2 SERPL-SCNC: 25 MMOL/L — SIGNIFICANT CHANGE UP (ref 22–31)
CO2 SERPL-SCNC: 27 MMOL/L — SIGNIFICANT CHANGE UP (ref 22–31)
CREAT SERPL-MCNC: 1.7 MG/DL — HIGH (ref 0.5–1.3)
CREAT SERPL-MCNC: 1.82 MG/DL — HIGH (ref 0.5–1.3)
DAT POLY-SP REAG RBC QL: NEGATIVE — SIGNIFICANT CHANGE UP
EGFR: 39 ML/MIN/1.73M2 — LOW
EGFR: 43 ML/MIN/1.73M2 — LOW
GLUCOSE BLDC GLUCOMTR-MCNC: 107 MG/DL — HIGH (ref 70–99)
GLUCOSE BLDC GLUCOMTR-MCNC: 119 MG/DL — HIGH (ref 70–99)
GLUCOSE BLDC GLUCOMTR-MCNC: 126 MG/DL — HIGH (ref 70–99)
GLUCOSE BLDC GLUCOMTR-MCNC: 137 MG/DL — HIGH (ref 70–99)
GLUCOSE SERPL-MCNC: 116 MG/DL — HIGH (ref 70–99)
GLUCOSE SERPL-MCNC: 119 MG/DL — HIGH (ref 70–99)
HAPTOGLOB SERPL-MCNC: 22 MG/DL — LOW (ref 34–200)
HAPTOGLOB SERPL-MCNC: 71 MG/DL — SIGNIFICANT CHANGE UP (ref 34–200)
HCT VFR BLD CALC: 23.1 % — LOW (ref 39–50)
HCT VFR BLD CALC: 24.5 % — LOW (ref 39–50)
HCT VFR BLD CALC: 26.7 % — LOW (ref 39–50)
HGB BLD-MCNC: 7.3 G/DL — LOW (ref 13–17)
HGB BLD-MCNC: 7.5 G/DL — LOW (ref 13–17)
HGB BLD-MCNC: 8.4 G/DL — LOW (ref 13–17)
LDH SERPL L TO P-CCNC: 530 U/L — HIGH (ref 50–242)
MAGNESIUM SERPL-MCNC: 2 MG/DL — SIGNIFICANT CHANGE UP (ref 1.6–2.6)
MAGNESIUM SERPL-MCNC: 2.1 MG/DL — SIGNIFICANT CHANGE UP (ref 1.6–2.6)
MCHC RBC-ENTMCNC: 26 PG — LOW (ref 27–34)
MCHC RBC-ENTMCNC: 26.2 PG — LOW (ref 27–34)
MCHC RBC-ENTMCNC: 26.2 PG — LOW (ref 27–34)
MCHC RBC-ENTMCNC: 30.6 GM/DL — LOW (ref 32–36)
MCHC RBC-ENTMCNC: 31.5 GM/DL — LOW (ref 32–36)
MCHC RBC-ENTMCNC: 31.6 GM/DL — LOW (ref 32–36)
MCV RBC AUTO: 82.8 FL — SIGNIFICANT CHANGE UP (ref 80–100)
MCV RBC AUTO: 83.2 FL — SIGNIFICANT CHANGE UP (ref 80–100)
MCV RBC AUTO: 85.1 FL — SIGNIFICANT CHANGE UP (ref 80–100)
NRBC # BLD: 0 /100 WBCS — SIGNIFICANT CHANGE UP (ref 0–0)
PHOSPHATE SERPL-MCNC: 3.3 MG/DL — SIGNIFICANT CHANGE UP (ref 2.5–4.5)
PHOSPHATE SERPL-MCNC: 3.8 MG/DL — SIGNIFICANT CHANGE UP (ref 2.5–4.5)
PLATELET # BLD AUTO: 199 K/UL — SIGNIFICANT CHANGE UP (ref 150–400)
PLATELET # BLD AUTO: 217 K/UL — SIGNIFICANT CHANGE UP (ref 150–400)
PLATELET # BLD AUTO: 217 K/UL — SIGNIFICANT CHANGE UP (ref 150–400)
POTASSIUM SERPL-MCNC: 3.4 MMOL/L — LOW (ref 3.5–5.3)
POTASSIUM SERPL-MCNC: 3.5 MMOL/L — SIGNIFICANT CHANGE UP (ref 3.5–5.3)
POTASSIUM SERPL-SCNC: 3.4 MMOL/L — LOW (ref 3.5–5.3)
POTASSIUM SERPL-SCNC: 3.5 MMOL/L — SIGNIFICANT CHANGE UP (ref 3.5–5.3)
PROT SERPL-MCNC: 7.4 G/DL — SIGNIFICANT CHANGE UP (ref 6–8.3)
RBC # BLD: 2.79 M/UL — LOW (ref 4.2–5.8)
RBC # BLD: 2.88 M/UL — LOW (ref 4.2–5.8)
RBC # BLD: 3.21 M/UL — LOW (ref 4.2–5.8)
RBC # FLD: 18.6 % — HIGH (ref 10.3–14.5)
RBC # FLD: 19.2 % — HIGH (ref 10.3–14.5)
RBC # FLD: 19.5 % — HIGH (ref 10.3–14.5)
SODIUM SERPL-SCNC: 139 MMOL/L — SIGNIFICANT CHANGE UP (ref 135–145)
SODIUM SERPL-SCNC: 140 MMOL/L — SIGNIFICANT CHANGE UP (ref 135–145)
TROPONIN T, HIGH SENSITIVITY RESULT: 270 NG/L — HIGH (ref 0–51)
TROPONIN T, HIGH SENSITIVITY RESULT: 325 NG/L — HIGH (ref 0–51)
TROPONIN T, HIGH SENSITIVITY RESULT: 391 NG/L — HIGH (ref 0–51)
URATE SERPL-MCNC: 5.6 MG/DL — SIGNIFICANT CHANGE UP (ref 3.4–8.8)
URATE SERPL-MCNC: 5.7 MG/DL — SIGNIFICANT CHANGE UP (ref 3.4–8.8)
WBC # BLD: 8.29 K/UL — SIGNIFICANT CHANGE UP (ref 3.8–10.5)
WBC # BLD: 8.52 K/UL — SIGNIFICANT CHANGE UP (ref 3.8–10.5)
WBC # BLD: 8.58 K/UL — SIGNIFICANT CHANGE UP (ref 3.8–10.5)
WBC # FLD AUTO: 8.29 K/UL — SIGNIFICANT CHANGE UP (ref 3.8–10.5)
WBC # FLD AUTO: 8.52 K/UL — SIGNIFICANT CHANGE UP (ref 3.8–10.5)
WBC # FLD AUTO: 8.58 K/UL — SIGNIFICANT CHANGE UP (ref 3.8–10.5)

## 2022-08-28 PROCEDURE — 99233 SBSQ HOSP IP/OBS HIGH 50: CPT | Mod: GC

## 2022-08-28 PROCEDURE — 99233 SBSQ HOSP IP/OBS HIGH 50: CPT

## 2022-08-28 PROCEDURE — 93010 ELECTROCARDIOGRAM REPORT: CPT

## 2022-08-28 RX ORDER — BUMETANIDE 0.25 MG/ML
1 INJECTION INTRAMUSCULAR; INTRAVENOUS
Refills: 0 | Status: DISCONTINUED | OUTPATIENT
Start: 2022-08-28 | End: 2022-08-29

## 2022-08-28 RX ORDER — POTASSIUM CHLORIDE 20 MEQ
20 PACKET (EA) ORAL ONCE
Refills: 0 | Status: COMPLETED | OUTPATIENT
Start: 2022-08-28 | End: 2022-08-28

## 2022-08-28 RX ORDER — FUROSEMIDE 40 MG
40 TABLET ORAL DAILY
Refills: 0 | Status: DISCONTINUED | OUTPATIENT
Start: 2022-08-28 | End: 2022-08-28

## 2022-08-28 RX ADMIN — Medication 10 MILLIGRAM(S): at 15:23

## 2022-08-28 RX ADMIN — Medication 100 MILLIGRAM(S): at 15:23

## 2022-08-28 RX ADMIN — BUMETANIDE 1 MILLIGRAM(S): 0.25 INJECTION INTRAMUSCULAR; INTRAVENOUS at 15:22

## 2022-08-28 RX ADMIN — Medication 12.5 MILLIGRAM(S): at 05:02

## 2022-08-28 RX ADMIN — APIXABAN 5 MILLIGRAM(S): 2.5 TABLET, FILM COATED ORAL at 18:48

## 2022-08-28 RX ADMIN — BUMETANIDE 1 MILLIGRAM(S): 0.25 INJECTION INTRAMUSCULAR; INTRAVENOUS at 05:02

## 2022-08-28 RX ADMIN — CHLORHEXIDINE GLUCONATE 1 APPLICATION(S): 213 SOLUTION TOPICAL at 15:23

## 2022-08-28 RX ADMIN — APIXABAN 5 MILLIGRAM(S): 2.5 TABLET, FILM COATED ORAL at 05:03

## 2022-08-28 RX ADMIN — Medication 12.5 MILLIGRAM(S): at 18:48

## 2022-08-28 RX ADMIN — Medication 20 MILLIEQUIVALENT(S): at 08:49

## 2022-08-28 RX ADMIN — Medication 10 MILLIGRAM(S): at 05:03

## 2022-08-28 RX ADMIN — ATORVASTATIN CALCIUM 40 MILLIGRAM(S): 80 TABLET, FILM COATED ORAL at 21:23

## 2022-08-28 RX ADMIN — Medication 10 MILLIGRAM(S): at 21:22

## 2022-08-28 NOTE — PROGRESS NOTE ADULT - SUBJECTIVE AND OBJECTIVE BOX
Patient seen and examined at bedside.    Overnight Events: No acute events. Feels better this morning.        Current Meds:  allopurinol 100 milliGRAM(s) Oral daily  apixaban 5 milliGRAM(s) Oral every 12 hours  atorvastatin 40 milliGRAM(s) Oral at bedtime  buMETAnide Injectable 1 milliGRAM(s) IV Push two times a day  chlorhexidine 2% Cloths 1 Application(s) Topical daily  dextrose 5%. 1000 milliLiter(s) IV Continuous <Continuous>  dextrose 5%. 1000 milliLiter(s) IV Continuous <Continuous>  dextrose 50% Injectable 25 Gram(s) IV Push once  dextrose 50% Injectable 12.5 Gram(s) IV Push once  dextrose 50% Injectable 25 Gram(s) IV Push once  dextrose Oral Gel 15 Gram(s) Oral once PRN  glucagon  Injectable 1 milliGRAM(s) IntraMuscular once  hydrALAZINE 10 milliGRAM(s) Oral every 8 hours  insulin lispro (ADMELOG) corrective regimen sliding scale   SubCutaneous Before meals and at bedtime  melatonin 3 milliGRAM(s) Oral at bedtime PRN  metoprolol tartrate 12.5 milliGRAM(s) Oral every 12 hours      Vitals:  T(F): 97.4 (08-28), Max: 97.8 (08-27)  HR: 104 (08-28) (71 - 108)  BP: 123/68 (08-28) (116/73 - 123/68)  RR: 20 (08-28)  SpO2: 97% (08-28)  I&O's Summary    27 Aug 2022 07:01  -  28 Aug 2022 07:00  --------------------------------------------------------  IN: 360 mL / OUT: 1050 mL / NET: -690 mL        Physical Exam:  GENERAL: NAD  ENT: JVP ~12cm  CHEST/LUNG: Clear to auscultation bilaterally; No wheeze, equal breath sounds bilaterally   HEART: Regular rate and rhythm; No murmurs, rubs, or gallops  EXTREMITIES:  No clubbing, cyanosis, or edema, warm   PSYCH: Nl behavior, nl affect  NEUROLOGY: alert                          7.5    8.52  )-----------( 199      ( 28 Aug 2022 10:33 )             24.5     08-28    139  |  99  |  101<H>  ----------------------------<  116<H>  3.5   |  25  |  1.70<H>    Ca    9.2      28 Aug 2022 10:32  Phos  3.3     08-28  Mg     2.0     08-28    TPro  7.4  /  Alb  3.6  /  TBili  2.5<H>  /  DBili  x   /  AST  20  /  ALT  13  /  AlkPhos  121<H>  08-28      CARDIAC MARKERS ( 28 Aug 2022 10:32 )  325 ng/L / x     / x     / x     / x     / x      CARDIAC MARKERS ( 28 Aug 2022 00:39 )  270 ng/L / x     / x     / x     / x     / x      CARDIAC MARKERS ( 27 Aug 2022 11:57 )  246 ng/L / x     / x     / x     / x     / x      CARDIAC MARKERS ( 27 Aug 2022 05:03 )  243 ng/L / x     / x     / 87 U/L / x     / 5.5 ng/mL  CARDIAC MARKERS ( 26 Aug 2022 23:08 )  225 ng/L / x     / x     / x     / x     / x          Serum Pro-Brain Natriuretic Peptide: [id number] pg/mL (08-26 @ 17:39)       HEART FAILURE PROGRESS NOTE    Patient seen and examined at bedside.    Overnight Events: No acute events. Feels better this morning.        Current Meds:  allopurinol 100 milliGRAM(s) Oral daily  apixaban 5 milliGRAM(s) Oral every 12 hours  atorvastatin 40 milliGRAM(s) Oral at bedtime  buMETAnide Injectable 1 milliGRAM(s) IV Push two times a day  chlorhexidine 2% Cloths 1 Application(s) Topical daily  dextrose 5%. 1000 milliLiter(s) IV Continuous <Continuous>  dextrose 5%. 1000 milliLiter(s) IV Continuous <Continuous>  dextrose 50% Injectable 25 Gram(s) IV Push once  dextrose 50% Injectable 12.5 Gram(s) IV Push once  dextrose 50% Injectable 25 Gram(s) IV Push once  dextrose Oral Gel 15 Gram(s) Oral once PRN  glucagon  Injectable 1 milliGRAM(s) IntraMuscular once  hydrALAZINE 10 milliGRAM(s) Oral every 8 hours  insulin lispro (ADMELOG) corrective regimen sliding scale   SubCutaneous Before meals and at bedtime  melatonin 3 milliGRAM(s) Oral at bedtime PRN  metoprolol tartrate 12.5 milliGRAM(s) Oral every 12 hours      Vitals:  T(F): 97.4 (08-28), Max: 97.8 (08-27)  HR: 104 (08-28) (71 - 108)  BP: 123/68 (08-28) (116/73 - 123/68)  RR: 20 (08-28)  SpO2: 97% (08-28)  I&O's Summary    27 Aug 2022 07:01  -  28 Aug 2022 07:00  --------------------------------------------------------  IN: 360 mL / OUT: 1050 mL / NET: -690 mL      Physical Exam:  GENERAL: NAD  ENT: JVP ~12cm  CHEST/LUNG: Clear to auscultation bilaterally; No wheeze, equal breath sounds bilaterally, posterior chest wall (back) elevated mass.  HEART: Regular rate and rhythm; No murmurs, rubs, or gallops  EXTREMITIES:  No clubbing, cyanosis, or edema, warm   PSYCH: Nl behavior, nl affect  NEUROLOGY: alert                          7.5    8.52  )-----------( 199      ( 28 Aug 2022 10:33 )             24.5     08-28    139  |  99  |  101<H>  ----------------------------<  116<H>  3.5   |  25  |  1.70<H>    Ca    9.2      28 Aug 2022 10:32  Phos  3.3     08-28  Mg     2.0     08-28    TPro  7.4  /  Alb  3.6  /  TBili  2.5<H>  /  DBili  x   /  AST  20  /  ALT  13  /  AlkPhos  121<H>  08-28      CARDIAC MARKERS ( 28 Aug 2022 10:32 )  325 ng/L / x     / x     / x     / x     / x      CARDIAC MARKERS ( 28 Aug 2022 00:39 )  270 ng/L / x     / x     / x     / x     / x      CARDIAC MARKERS ( 27 Aug 2022 11:57 )  246 ng/L / x     / x     / x     / x     / x      CARDIAC MARKERS ( 27 Aug 2022 05:03 )  243 ng/L / x     / x     / 87 U/L / x     / 5.5 ng/mL  CARDIAC MARKERS ( 26 Aug 2022 23:08 )  225 ng/L / x     / x     / x     / x     / x          Serum Pro-Brain Natriuretic Peptide: [id number] pg/mL (08-26 @ 17:39)

## 2022-08-28 NOTE — PROGRESS NOTE ADULT - ASSESSMENT
71 yo M w/ PMH CHB s/p PPM upgraded to CRT-P, HFrEF 2/2 chemo, DLBCL s/p chemo, HTN, nonobstructive CAD, Aflutter on apixaban who presented w/ SOB and anemia. 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 He was started on rasburicase for TLS but with notable G6PD hemolytics anemia. Was told to come into the ED and given 3u PRBC in total so far with improvement of anemia. He was also started on BiPAP given his tachypnea. CXRAY showed R pleural effusion with known R middle lobe opacity found to be in heart failure exacerbation.     Cardiac Testing:  Echo:  8/27/22: LVEF 20%, decreased RV function, severe MR  11/7/2019: LVEF 40%, moderate pulmonary hypertension, enlarged LA size, severe mitral regurgitation    Cardiac Cath: 5/2019, 70% 1st diagonal, 60% distal circumflex, otherwise no occlusive disease      Pacemaker: 9/30/2019, [employer] [id number] BiV DDDR        Problem/Recommendation - 1:  ·  Problem: Acute on chronic systolic congestive heart failure.   ·  Recommendation:   -Recommend echo, had hx of severe MR which improved with CRT upgrade   -Was on torsemide at home.  Continue Bumex 1mg IV BID. Possibly transition to PO tomorrow   - Holding lisinopril in setting of poor renal function.   -Continue Hydralazine 10mg TID for afterload reduction.     Problem/Recommendation - 2:  ·  Problem: Elevated troponin.   ·  Recommendation: Likely demand, has known non-obstructive CAD, no chest pain or EKG changes   -Trend troponin to peak.     Problem/Recommendation - 3:  ·  Problem: Chronic atrial fibrillation.   ·  Recommendation: Patient has hx of flutter/fib and Atach on apixaban and recently started on ivabridine given his Atach on his CRT interrogation but insurance did not cover  -Holding carvedilol at this time given his hypotension. Continue Metoprolol Tartrate 12.5mg BID  -Would continue apixaban if no contraindication per primary team.

## 2022-08-28 NOTE — PROGRESS NOTE ADULT - PROBLEM SELECTOR PLAN 3
- EKG with prolonged QTc 500s from 600s   - repeat EKG with 527  - troponin 225 at 8/26 2300  - no CP at this time, elevated troponin possibly 2/2 to demand ischemia   - repeat trops with minor uptrend  - f/u midnight labs, trops - EKG with prolonged QTc 500s from 600s   - repeat EKG with 527  - troponin 225 at 8/26 2300  - no CP at this time, elevated troponin possibly 2/2 to demand ischemia   - repeat trops with minor uptrend  - trend trops to peak

## 2022-08-28 NOTE — PROGRESS NOTE ADULT - PROBLEM SELECTOR PLAN 6
- possibly i/s/o medication induced QT prolongation  - EKG 8/26 1638, rate 100 bpm,  ms, QTc 691 ms, ventricular paced  - EKG 8/26 2100, rate 94 bpm, Ekaterina 156, , QTc 547 ms, ventricular paced   - EKG 8/27 QTc 527  - f/u EKG

## 2022-08-28 NOTE — PROGRESS NOTE ADULT - PROBLEM SELECTOR PLAN 5
- p/w acute hypotension with MAPs < 65  - MICU consulted with recommendations to give albumin 5% 250 cc bolus, pt s/p  cc bolus (caution i/s/o HF)  - currently HDS  - f/u repeat TTE

## 2022-08-28 NOTE — CONSULT NOTE ADULT - ASSESSMENT
ASSESSMENT: 72M with significant history of HTN, CKD stage II, complete heart block (PPM in place), HLD, HFrEF (45% in 2019; 20% during this admission), and DLBCL (tx with R-CHOP in 2003, with relapse in 2009 treated now with multiple areas of palpable lymphadenopathy with biopsies shown to be at least grade IIIA follicular lymphoma. Patient admitted with tumor lysis syndrome, which was treated with rasburicase and patient developed hemolysis due to G6PD deficiency.     Surgical Oncology consulted for surgical biopsy for definite diagnosis.    PLAN:    - no acute surgical intervention indicated    - please continue to optimize the patient medically   - pt will likely require a left cervical lymph node and/or left upper back mass biopsy during this admission to further direct care   - please document medical/cardiac clearance   - will follow     - Plan discussed with Attending, Dr. Tavarez       [facility]  [id number]

## 2022-08-28 NOTE — PROGRESS NOTE ADULT - PROBLEM SELECTOR PLAN 4
s/p rasburicase  - uric acid 6.0, potassium stable   - now on allopurinol 100mg   - f/u midnight labs s/p rasburicase  - uric acid 6.0, potassium stable   - now on allopurinol 100mg   - midnight labs wnl

## 2022-08-28 NOTE — PROGRESS NOTE ADULT - ATTENDING COMMENTS
Patient was seen and examined with the fellow.  Appears much more comfortable on exam today, able to lay flat.  JVP approximately 12cm.  Would continue with IV diuretics today (Bumex 1mg IV BID). Monitor I and Os closely and check daily standing weights.  Will need to be put back on GDMT close to discharge.  Started on Metoprolol 12.5mg XL daily today as opposed to home carvedilol due to periods of hypotension.  Over the next day can add on Entresto and SGLT2i if kidney function permits.  Please have his ICD interrogated. Patient was seen and examined with the fellow.  Appears much more comfortable on exam today, able to lay flat.  JVP approximately 12cm.  Would continue with IV diuretics today (Bumex 1mg IV BID). Monitor I and Os closely and check daily standing weights.  Will need to be put back on GDMT close to discharge.  Started on Metoprolol tartrate 12.5mg BID as opposed to home carvedilol due to periods of hypotension. Would favor either metoprolol succinate on discharge or putting back on a lower dose of his home carvedilol (was on 25mg BID at home).  Over the next day can add on Entresto (in place of home lisinopril and hydralazine) and SGLT2i if kidney function permits.  Please have his ICD interrogated.  Check daily CMP.

## 2022-08-28 NOTE — PROGRESS NOTE ADULT - PROBLEM SELECTOR PLAN 2
presenting with G6PD hemolysis (low hapto, high LDH/alk/bili) s/p rasburicase for TLS on 8/23 w/hgb 5 on 8/26 (hgb 9 on 8/23)  - d/c rasburicase in setting of G6PD  - pt s/p 2u pRBC 8/26, post transfusion CBC s/p 1st unity = 6; s/p 2nd unit = 7; s/p 3rd unit = 8.2  - f/u midnight labs, transfuse goals >8 with elevated trops presenting with G6PD hemolysis (low hapto, high LDH/alk/bili) s/p rasburicase for TLS on 8/23 w/hgb 5 on 8/26 (hgb 9 on 8/23)  - d/c rasburicase in setting of G6PD  - pt s/p 2u pRBC 8/26, post transfusion CBC s/p 1st unity = 6; s/p 2nd unit = 7; s/p 3rd unit = 8.2  - Hg 7.3 O/N 8/28, transfuse >8

## 2022-08-28 NOTE — CONSULT NOTE ADULT - SUBJECTIVE AND OBJECTIVE BOX
SURGICAL ONCOLOGY CONSULT NOTE  ATTENDING: Dr. DEVYN DHILLON  pager [phone]   --------------------------------------------------------------------------------------------      HPI:  ASSESSMENT: 72M with significant history of HTN, CKD stage II, complete heart block (PPM in place), HLD, HFrEF (45% in 2019; 20% during this admission), and DLBCL (tx with R-CHOP in 2003, with relapse in 2009 treated now with multiple areas of palpable lymphadenopathy with biopsies shown to be at least grade IIIA follicular lymphoma. Patient admitted with tumor lysis syndrome, which was treated with rasburicase and patient developed hemolysis due to G6PD deficiency.     US of left axillary and cervical lymph node (06/14/22): B-cell lymphoma with follicular center origin  PET/CT scan (08/06/22): FDG activity in the left cervical, bilateral supraclavicular and intramuscular masses in the left posterior chest wall. Highest SUV noted in left cervical region 2B node, 2.5x1.1cm/SUV 20.1 and posterior chest wall intramuscular mass 3.1x4.3cm/SUV 20.3           Care Providers:    PMD: Dr Amari Hickman  Endo: Dr Carter Vigil  Cardiologist: Dr. Don [phone] fax [phone]    =======================================================    Vonnie (daughter): [phone] - takes all healthcare related calls.    (27 Aug 2022 03:15)    ROS: 10-system review is otherwise negative except HPI above.      PAST MEDICAL & SURGICAL HISTORY:  Non-Hodgkins Lymphoma  In [facility] for &gt; 10 years      DM type 2 (diabetes mellitus, type 2)  Never on insulin      Essential hypertension      Rhinitis, allergic      Systolic heart failure      Moderate mitral regurgitation      Pleural effusion      Atrial flutter, unspecified type      Impaired memory      Non-Hodgkin lymphoma  h/o axillary dissection      Cardiac pacemaker        FAMILY HISTORY:  Family history of CHF (congestive heart failure)  Mother    Family history of non-Hodgkin&#x27;s lymphoma (Sibling)    [] Family history not pertinent as reviewed with the patient and family    SOCIAL HISTORY:  n/a    ALLERGIES: No Known Allergies      HOME MEDICATIONS: n/a    CURRENT MEDICATIONS  MEDICATIONS (STANDING): allopurinol 100 milliGRAM(s) Oral daily  apixaban 5 milliGRAM(s) Oral every 12 hours  atorvastatin 40 milliGRAM(s) Oral at bedtime  buMETAnide Injectable 1 milliGRAM(s) IV Push two times a day  dextrose 5%. 1000 milliLiter(s) IV Continuous <Continuous>  dextrose 5%. 1000 milliLiter(s) IV Continuous <Continuous>  dextrose 50% Injectable 25 Gram(s) IV Push once  dextrose 50% Injectable 12.5 Gram(s) IV Push once  dextrose 50% Injectable 25 Gram(s) IV Push once  glucagon  Injectable 1 milliGRAM(s) IntraMuscular once  hydrALAZINE 10 milliGRAM(s) Oral every 8 hours  insulin lispro (ADMELOG) corrective regimen sliding scale   SubCutaneous Before meals and at bedtime  metoprolol tartrate 12.5 milliGRAM(s) Oral every 12 hours    MEDICATIONS (PRN):dextrose Oral Gel 15 Gram(s) Oral once PRN Blood Glucose LESS THAN 70 milliGRAM(s)/deciliter  melatonin 3 milliGRAM(s) Oral at bedtime PRN Insomnia    --------------------------------------------------------------------------------------------    Vitals:   T(C): 36.4 (08-28-22 @ 12:00), Max: 36.6 (08-27-22 @ 19:53)  HR: 80 (08-28-22 @ 12:00) (71 - 108)  BP: 119/70 (08-28-22 @ 12:00) (116/73 - 123/68)  RR: 16 (08-28-22 @ 12:00) (16 - 20)  SpO2: 98% (08-28-22 @ 12:00) (95% - 100%)  CAPILLARY BLOOD GLUCOSE      POCT Blood Glucose.: 126 mg/dL (28 Aug 2022 12:04)  POCT Blood Glucose.: 107 mg/dL (28 Aug 2022 07:51)  POCT Blood Glucose.: 150 mg/dL (27 Aug 2022 21:18)  POCT Blood Glucose.: 152 mg/dL (27 Aug 2022 16:05)      08-27 @ 07:01  -  08-28 @ 07:00  --------------------------------------------------------  IN:    Oral Fluid: 360 mL  Total IN: 360 mL    OUT:    Voided (mL): 1050 mL  Total OUT: 1050 mL    Total NET: -690 mL        Height (cm): 183.5 (08-26 @ 15:14)  Weight (kg): 76.7 (08-26 @ 15:14)  BMI (kg/m2): 22.8 (08-26 @ 15:14)  BSA (m2): 1.99 (08-26 @ 15:14)    PHYSICAL EXAM: ***  General: NAD, Lying in bed comfortably  Neuro: A+Ox3  HEENT: NC/AT, EOMI  Neck: Soft, supple  Cardio: RRR, nml S1/S2  Resp: Good effort, CTA b/l  Thorax: No chest wall tenderness  Breast: No lesions/masses, no drainage  GI/Abd: Soft, NT/ND, no rebound/guarding, no masses palpated  Vascular: All 4 extremities warm.  Skin: Intact, no breakdown  Lymphatic/Nodes: No palpable lymphadenopathy  Musculoskeletal: All 4 extremities moving spontaneously, no limitations  --------------------------------------------------------------------------------------------    LABS  CBC (08-28 @ 10:33)                              7.5<L>                         8.52    )----------------(  199        --    % Neutrophils, --    % Lymphocytes, ANC: --                                  24.5<L>  CBC (08-28 @ 00:39)                              7.3<L>                         8.58    )----------------(  217        --    % Neutrophils, --    % Lymphocytes, ANC: --                                  23.1<L>    BMP (08-28 @ 10:32)             139     |  99      |  101<H>		Ca++ --      Ca 9.2                ---------------------------------( 116<H>		Mg 2.0                3.5     |  25      |  1.70<H>			Ph 3.3     BMP (08-28 @ 00:39)             140     |  99      |  103<H>		Ca++ --      Ca 9.5                ---------------------------------( 119<H>		Mg 2.1                3.4<L>  |  27      |  1.82<H>			Ph 3.8       LFTs (08-28 @ 10:32)      TPro 7.4 / Alb 3.6 / TBili 2.5<H> / DBili -- / AST 20 / ALT 13 / AlkPhos 121<H>  LFTs (08-27 @ 11:57)      TPro 7.9 / Alb 3.9 / TBili 4.0<H> / DBili -- / AST 25 / ALT 13 / AlkPhos 134<H>      Cardiac Markers (08-27 @ 05:03)     HSTrop: -- / CKMB: -- / CK: 87      VBG (08-27 @ 05:04)     7.43 / 43 / 64<H> / 28 / 3.8<H> / 94.1<H>%     Lactate: 1.6    --------------------------------------------------------------------------------------------    MICROBIOLOGY    -> .Blood Blood-Peripheral Culture (08-26 @ 16:40)     NG    NG    No growth to date.    -> .Blood Blood-Peripheral Culture (08-26 @ 16:22)     NG    NG    No growth to date.      --------------------------------------------------------------------------------------------    IMAGING  < from: CT Abdomen and Pelvis w/ IV Cont (08.26.22 @ 17:40) >  FINDINGS:  LOWER CHEST: Trace left pleural effusion. Emphysema. Partially imaged   cardiac device leads.    LIVER: Prominent Riedel's lobe.  BILE DUCTS: Normal caliber.  GALLBLADDER: Cholelithiasis.  SPLEEN: Splenomegaly.  PANCREAS: Within normal limits.  ADRENALS: Within normal limits.  KIDNEYS/URETERS: Within normal limits.    BLADDER: Within normal limits.  REPRODUCTIVE ORGANS: Prostate is enlarged.    BOWEL: Mural thickening of small bowel loopsin the right hemiabdomen   suggestive of enteritis. Colonic diverticulosis without diverticulitis.   No bowel obstruction. Appendix is not visualized. No evidence of   inflammation in the pericecal region.  PERITONEUM: Small volume ascites.  VESSELS:Atherosclerotic changes.  RETROPERITONEUM/LYMPH NODES: Similar retrocrural lymph node measures 1.2   x 0.7 cm (3:8). Multiple pericaval and periaortic lymph nodes, for   reference a para-aortic lymph node measures 2.3 x 1.0 cm (3:37).  ABDOMINAL WALL: Redemonstrated subcutaneous nodules along the right   lateral and left anterior abdominal wall, not significantly changed when   compared to PET CT 8/6/2022.  BONES: Degenerative changes.    IMPRESSION:  Probable enteritis.    Small volume ascites.    Redemonstrated additional findings consistent with history of lymphoma   without significant change when compared to PET/CT 8/6/2022.        --- End of Report ---           ROSELINE STOCKTON MD; Resident Radiology  This document has been electronicallysigned.  REMINGTON RUBIN MD; Attending Radiologist  This document has been electronically signed. Aug 26 2022  6:26PM    < end of copied text >  < from: US Chest (08.20.22 @ 17:12) >  FINDINGS:    The site of palpable abnormality in the left posterior back region   corresponds to 8.4 x 2.5 x 6.4 cm heterogeneous predominantly hypoechoic   subcutaneous mass, intramuscular location. This is consistent with   lymphomatous involvement. The lesions correspond to the FDG avid lesion   seen on the prior PET/CT.    Also noted are 2.6 x 2.6 and 2.5 x 3.1 cm cm hypoechoic subcutaneous   lesions  in the right posterior back also consistent with lymphomatous   involvement. The lesions correspond to the FDG avid lesions seen on the   prior PET/CT.    IMPRESSION:    Posterior back intramuscular masses as described above corresponding to   the FDG avid lesion seen on the prior study and consistent with   lymphomatous involvement. The largest mass is accessible to   ultrasound-guided core biopsy as clinically indicated.    --- End of Report ---               CYNTHIA ARRIAGA MD; Attending Radiologist   This document hasbeen electronically signed. Aug 20 2022  6:12PM    < end of copied text >      --------------------------------------------------------------------------------------------

## 2022-08-28 NOTE — PROGRESS NOTE ADULT - PROBLEM SELECTOR PLAN 1
Presenting with respiratory distress possibly 2/2 acute hemolytic anemia and HF exacerbation vs. ACS r/o (pt with known chronic right sided loculated effusion). Started on BiPAP at 11 ipap/5 epap  - overnight POCUS - Chest with bilateral B lines. Left pleural base consolidation with small pleural effusion. Right anterior B lines and possible consolidation of R anterior mid lobe  - now off BiPAP on NC at 3L saturating well   - f/u TTE  - could potentially benefit from diuresis in setting of possible pleural effusion but also with TLS, improving labs will c/w trend

## 2022-08-28 NOTE — PROGRESS NOTE ADULT - PROBLEM SELECTOR PLAN 8
- h/o grade 3 follicular lymphoma, pt was pending surgical biopsy on 8/23, however in heme/onc outpt labs s/f TLS with uric acid 13.7, pt was started rasburicase on 8/23 and developed rasburicase-triggered G6PD hemolysis with hgb 5.7 on 8/26  - UA 6.4 from 13.7 s/p rasburicase on 8/23  - trend hemolysis labs   - per heme recommendations 8/26:   -- continue with allopurinol daily   - follow-up with heme on 8/27      - "if infectious workup negative, nontoxic appearing and otherwise stable plan for steroids (100mg prednisone daily x5 days along with inpatient chemotherapy with O-COEP"

## 2022-08-28 NOTE — PROGRESS NOTE ADULT - SUBJECTIVE AND OBJECTIVE BOX
Internal Medicine   Sadebhanu Hamm | PGY-2    OVERNIGHT EVENTS: ALISA      SUBJECTIVE: Patient was seen and examined at bedside this morning. Denies any nausea/vomiting/diarrhea, headache, shortness of breath, abdominal pain or chest pain/palpitations. Patient responding appropriately to questions and able to make needs known.       MEDICATIONS  (STANDING):  allopurinol 100 milliGRAM(s) Oral daily  apixaban 5 milliGRAM(s) Oral every 12 hours  atorvastatin 40 milliGRAM(s) Oral at bedtime  buMETAnide Injectable 1 milliGRAM(s) IV Push two times a day  chlorhexidine 2% Cloths 1 Application(s) Topical daily  dextrose 5%. 1000 milliLiter(s) (50 mL/Hr) IV Continuous <Continuous>  dextrose 5%. 1000 milliLiter(s) (100 mL/Hr) IV Continuous <Continuous>  dextrose 50% Injectable 25 Gram(s) IV Push once  dextrose 50% Injectable 12.5 Gram(s) IV Push once  dextrose 50% Injectable 25 Gram(s) IV Push once  glucagon  Injectable 1 milliGRAM(s) IntraMuscular once  hydrALAZINE 10 milliGRAM(s) Oral every 8 hours  insulin lispro (ADMELOG) corrective regimen sliding scale   SubCutaneous Before meals and at bedtime  metoprolol tartrate 12.5 milliGRAM(s) Oral every 12 hours    MEDICATIONS  (PRN):  dextrose Oral Gel 15 Gram(s) Oral once PRN Blood Glucose LESS THAN 70 milliGRAM(s)/deciliter  melatonin 3 milliGRAM(s) Oral at bedtime PRN Insomnia        T(F): 97.4 (08-28-22 @ 04:00), Max: 97.8 (08-27-22 @ 19:53)  HR: 104 (08-28-22 @ 09:38) (71 - 108)  BP: 123/68 (08-28-22 @ 04:00) (116/73 - 123/68)  BP(mean): --  RR: 20 (08-28-22 @ 04:00) (20 - 20)  SpO2: 97% (08-28-22 @ 09:38) (95% - 100%)    PHYSICAL EXAM:     GENERAL: NAD, lying in bed comfortably.  HEAD:  Atraumatic, normocephalic.  CHEST/LUNG: CTAB. No rales, rhonchi, wheezing, or rubs. Unlabored respirations.  HEART: RRR, no M/R/G, normal S1/S2.  ABDOMEN: Soft, nontender, nondistended, no organomegaly. Normoactive bowel sounds.  EXTREMITIES:  1+ peripheral pulses b/l, brisk capillary refill. No clubbing, cyanosis, or edema.  MSK: No gross deformities noted.   NEURO:  AAOx3, no focal deficits.   SKIN: No rashes or lesions.  PSYCH: Normal mood, affect.    TELEMETRY: 70s-80s with PVCs and bigeminy    LABS:                        7.3    8.58  )-----------( 217      ( 28 Aug 2022 00:39 )             23.1     08-28    140  |  99  |  103<H>  ----------------------------<  119<H>  3.4<L>   |  27  |  1.82<H>    Ca    9.5      28 Aug 2022 00:39  Phos  3.8     08-28  Mg     2.1     08-28    TPro  7.9  /  Alb  3.9  /  TBili  4.0<H>  /  DBili  x   /  AST  25  /  ALT  13  /  AlkPhos  134<H>  08-27    CARDIAC MARKERS ( 27 Aug 2022 05:03 )  x     / x     / 87 U/L / x     / 5.5 ng/mL          Creatinine Trend: 1.82<--, 1.81<--, 1.84<--, 1.92<--, 1.92<--  I&O's Summary    27 Aug 2022 07:01  -  28 Aug 2022 07:00  --------------------------------------------------------  IN: 360 mL / OUT: 1050 mL / NET: -690 mL      BNP    RADIOLOGY & ADDITIONAL STUDIES:

## 2022-08-28 NOTE — PROGRESS NOTE ADULT - ATTENDING COMMENTS
73yo M w/ extensive PMHx including DLBCL, G6PD deficiency, complete heart block s/p PPM, HFrEF (45%), CKD presents with anemia, pt was treated for TLS syndrome w/ rasburicase on 8/23 and current episode concerning for an acute hemolytic anemia in setting of G6PD deficiency, pt appears clinically euvolemic at this time and reports breathing comfortably downtitrated from Bipap to NC.     #Hemolytic anemia  -s/p transfusion, counts stable, continue to trend CBC  -Downtrending hemolysis labs  -G6PD level pending  -c/w A/C (eliquis)  -heme following    #TLS  -daily TLS labs  -c/w IVF, start diuresis given volume and concurrent heart failure. Bumex BID  -renally dose allopurinol    #HF  -Cards following  -Low dose afterload reduction w/ hydral 10 TID PO  -pt on coreg at home, c/w low dose metop to prevent rebound tachy per cards; dc on metop succinate or on lower dose of coreg  -can consider adding entresto and SGLT2i pending renal function  -Call EP in AM for ICD interrogation  -may need ischemic workup prior to dc, f/u with cards  -echo  -serial EKGs       Milena Beth MD  Division of Hospital Medicine  Pager: [phone] or reachable on MS Teams  After hours please page [phone] .

## 2022-08-28 NOTE — PROGRESS NOTE ADULT - PROBLEM SELECTOR PLAN 7
- h/o HFrEF EF 45%, diffuse LV hypokinesis, mod pulmHTN; followed by cardiology outpt  - p/w ARGUETA, on bipap, imaging c/f pleural effusion  - proBNP 67k compared to 28K in 2019   - MICU bedside pocus w/diffuse B lines and pleural effusion  - troponin 225 -> 243  - f/u TTE, compare to 2019  - continue to hold carvedilol, f/u cardiology in AM - h/o HFrEF EF 45%, diffuse LV hypokinesis, mod pulmHTN; followed by cardiology outpt  - p/w ARGUETA, on bipap, imaging c/f pleural effusion  - proBNP 67k compared to 28K in 2019   - MICU bedside pocus w/diffuse B lines and pleural effusion  - troponin 225 -> 243 -> 270  - f/u TTE, compare to 2019  - continue to hold carvedilol, f/u cardiology in AM

## 2022-08-29 DIAGNOSIS — Z29.9 ENCOUNTER FOR PROPHYLACTIC MEASURES, UNSPECIFIED: ICD-10-CM

## 2022-08-29 DIAGNOSIS — Z95.0 PRESENCE OF CARDIAC PACEMAKER: ICD-10-CM

## 2022-08-29 LAB
A1C WITH ESTIMATED AVERAGE GLUCOSE RESULT: 5.4 % — SIGNIFICANT CHANGE UP (ref 4–5.6)
ANION GAP SERPL CALC-SCNC: 13 MMOL/L — SIGNIFICANT CHANGE UP (ref 5–17)
ANION GAP SERPL CALC-SCNC: 14 MMOL/L — SIGNIFICANT CHANGE UP (ref 5–17)
BUN SERPL-MCNC: 93 MG/DL — HIGH (ref 7–23)
BUN SERPL-MCNC: 97 MG/DL — HIGH (ref 7–23)
CALCIUM SERPL-MCNC: 9.4 MG/DL — SIGNIFICANT CHANGE UP (ref 8.4–10.5)
CALCIUM SERPL-MCNC: 9.6 MG/DL — SIGNIFICANT CHANGE UP (ref 8.4–10.5)
CHLORIDE SERPL-SCNC: 101 MMOL/L — SIGNIFICANT CHANGE UP (ref 96–108)
CHLORIDE SERPL-SCNC: 98 MMOL/L — SIGNIFICANT CHANGE UP (ref 96–108)
CO2 SERPL-SCNC: 25 MMOL/L — SIGNIFICANT CHANGE UP (ref 22–31)
CO2 SERPL-SCNC: 27 MMOL/L — SIGNIFICANT CHANGE UP (ref 22–31)
CREAT SERPL-MCNC: 1.75 MG/DL — HIGH (ref 0.5–1.3)
CREAT SERPL-MCNC: 1.79 MG/DL — HIGH (ref 0.5–1.3)
EGFR: 40 ML/MIN/1.73M2 — LOW
EGFR: 41 ML/MIN/1.73M2 — LOW
ESTIMATED AVERAGE GLUCOSE: 108 MG/DL — SIGNIFICANT CHANGE UP (ref 68–114)
GLUCOSE BLDC GLUCOMTR-MCNC: 111 MG/DL — HIGH (ref 70–99)
GLUCOSE BLDC GLUCOMTR-MCNC: 121 MG/DL — HIGH (ref 70–99)
GLUCOSE BLDC GLUCOMTR-MCNC: 218 MG/DL — HIGH (ref 70–99)
GLUCOSE BLDC GLUCOMTR-MCNC: 232 MG/DL — HIGH (ref 70–99)
GLUCOSE SERPL-MCNC: 102 MG/DL — HIGH (ref 70–99)
GLUCOSE SERPL-MCNC: 115 MG/DL — HIGH (ref 70–99)
HAPTOGLOB SERPL-MCNC: 105 MG/DL — SIGNIFICANT CHANGE UP (ref 34–200)
HAPTOGLOB SERPL-MCNC: 93 MG/DL — SIGNIFICANT CHANGE UP (ref 34–200)
HCT VFR BLD CALC: 26.8 % — LOW (ref 39–50)
HCT VFR BLD CALC: 29.4 % — LOW (ref 39–50)
HGB BLD-MCNC: 8.4 G/DL — LOW (ref 13–17)
HGB BLD-MCNC: 9.2 G/DL — LOW (ref 13–17)
LDH SERPL L TO P-CCNC: 478 U/L — HIGH (ref 50–242)
LDH SERPL L TO P-CCNC: 490 U/L — HIGH (ref 50–242)
MAGNESIUM SERPL-MCNC: 2 MG/DL — SIGNIFICANT CHANGE UP (ref 1.6–2.6)
MAGNESIUM SERPL-MCNC: 2 MG/DL — SIGNIFICANT CHANGE UP (ref 1.6–2.6)
MCHC RBC-ENTMCNC: 26.3 PG — LOW (ref 27–34)
MCHC RBC-ENTMCNC: 26.4 PG — LOW (ref 27–34)
MCHC RBC-ENTMCNC: 31.3 GM/DL — LOW (ref 32–36)
MCHC RBC-ENTMCNC: 31.3 GM/DL — LOW (ref 32–36)
MCV RBC AUTO: 84 FL — SIGNIFICANT CHANGE UP (ref 80–100)
MCV RBC AUTO: 84.3 FL — SIGNIFICANT CHANGE UP (ref 80–100)
NRBC # BLD: 0 /100 WBCS — SIGNIFICANT CHANGE UP (ref 0–0)
NRBC # BLD: 0 /100 WBCS — SIGNIFICANT CHANGE UP (ref 0–0)
PHOSPHATE SERPL-MCNC: 3 MG/DL — SIGNIFICANT CHANGE UP (ref 2.5–4.5)
PLATELET # BLD AUTO: 237 K/UL — SIGNIFICANT CHANGE UP (ref 150–400)
PLATELET # BLD AUTO: 259 K/UL — SIGNIFICANT CHANGE UP (ref 150–400)
POTASSIUM SERPL-MCNC: 3.4 MMOL/L — LOW (ref 3.5–5.3)
POTASSIUM SERPL-MCNC: 3.7 MMOL/L — SIGNIFICANT CHANGE UP (ref 3.5–5.3)
POTASSIUM SERPL-SCNC: 3.4 MMOL/L — LOW (ref 3.5–5.3)
POTASSIUM SERPL-SCNC: 3.7 MMOL/L — SIGNIFICANT CHANGE UP (ref 3.5–5.3)
RBC # BLD: 3.18 M/UL — LOW (ref 4.2–5.8)
RBC # BLD: 3.5 M/UL — LOW (ref 4.2–5.8)
RBC # FLD: 18.8 % — HIGH (ref 10.3–14.5)
RBC # FLD: 18.9 % — HIGH (ref 10.3–14.5)
SODIUM SERPL-SCNC: 138 MMOL/L — SIGNIFICANT CHANGE UP (ref 135–145)
SODIUM SERPL-SCNC: 140 MMOL/L — SIGNIFICANT CHANGE UP (ref 135–145)
TROPONIN T, HIGH SENSITIVITY RESULT: 379 NG/L — HIGH (ref 0–51)
TROPONIN T, HIGH SENSITIVITY RESULT: 476 NG/L — HIGH (ref 0–51)
TROPONIN T, HIGH SENSITIVITY RESULT: 495 NG/L — HIGH (ref 0–51)
URATE SERPL-MCNC: 6.1 MG/DL — SIGNIFICANT CHANGE UP (ref 3.4–8.8)
URATE SERPL-MCNC: 6.3 MG/DL — SIGNIFICANT CHANGE UP (ref 3.4–8.8)
WBC # BLD: 10.36 K/UL — SIGNIFICANT CHANGE UP (ref 3.8–10.5)
WBC # BLD: 8.55 K/UL — SIGNIFICANT CHANGE UP (ref 3.8–10.5)
WBC # FLD AUTO: 10.36 K/UL — SIGNIFICANT CHANGE UP (ref 3.8–10.5)
WBC # FLD AUTO: 8.55 K/UL — SIGNIFICANT CHANGE UP (ref 3.8–10.5)

## 2022-08-29 PROCEDURE — 93281 PM DEVICE PROGR EVAL MULTI: CPT | Mod: 26

## 2022-08-29 PROCEDURE — 99233 SBSQ HOSP IP/OBS HIGH 50: CPT | Mod: GC

## 2022-08-29 RX ORDER — BUMETANIDE 0.25 MG/ML
1 INJECTION INTRAMUSCULAR; INTRAVENOUS ONCE
Refills: 0 | Status: COMPLETED | OUTPATIENT
Start: 2022-08-29 | End: 2022-08-29

## 2022-08-29 RX ORDER — BUMETANIDE 0.25 MG/ML
2 INJECTION INTRAMUSCULAR; INTRAVENOUS
Refills: 0 | Status: DISCONTINUED | OUTPATIENT
Start: 2022-08-29 | End: 2022-09-03

## 2022-08-29 RX ORDER — POTASSIUM CHLORIDE 20 MEQ
40 PACKET (EA) ORAL ONCE
Refills: 0 | Status: COMPLETED | OUTPATIENT
Start: 2022-08-29 | End: 2022-08-29

## 2022-08-29 RX ADMIN — BUMETANIDE 1 MILLIGRAM(S): 0.25 INJECTION INTRAMUSCULAR; INTRAVENOUS at 05:11

## 2022-08-29 RX ADMIN — ATORVASTATIN CALCIUM 40 MILLIGRAM(S): 80 TABLET, FILM COATED ORAL at 21:16

## 2022-08-29 RX ADMIN — BUMETANIDE 1 MILLIGRAM(S): 0.25 INJECTION INTRAMUSCULAR; INTRAVENOUS at 18:17

## 2022-08-29 RX ADMIN — Medication 10 MILLIGRAM(S): at 13:42

## 2022-08-29 RX ADMIN — Medication 10 MILLIGRAM(S): at 21:16

## 2022-08-29 RX ADMIN — Medication 2: at 22:40

## 2022-08-29 RX ADMIN — Medication 12.5 MILLIGRAM(S): at 05:11

## 2022-08-29 RX ADMIN — Medication 100 MILLIGRAM(S): at 11:02

## 2022-08-29 RX ADMIN — CHLORHEXIDINE GLUCONATE 1 APPLICATION(S): 213 SOLUTION TOPICAL at 13:30

## 2022-08-29 RX ADMIN — Medication 40 MILLIEQUIVALENT(S): at 11:02

## 2022-08-29 RX ADMIN — BUMETANIDE 1 MILLIGRAM(S): 0.25 INJECTION INTRAMUSCULAR; INTRAVENOUS at 13:42

## 2022-08-29 RX ADMIN — Medication 2: at 16:52

## 2022-08-29 RX ADMIN — Medication 12.5 MILLIGRAM(S): at 18:17

## 2022-08-29 RX ADMIN — Medication 10 MILLIGRAM(S): at 05:11

## 2022-08-29 RX ADMIN — APIXABAN 5 MILLIGRAM(S): 2.5 TABLET, FILM COATED ORAL at 05:11

## 2022-08-29 NOTE — CONSULT NOTE ADULT - ASSESSMENT
72 year old male with significant history of HTN, CKD stage III, complete heart block (PPM in place), HLD, HFrEF (45% in 2019), and DLBCL (tx with R-CHOP in 2003, with relapse in 2009 treated with BR) now with recently diagnosed grade 3 follicular lymphoma on 8/23 who presents with anemia and SOB, patient was pending a surgical biopsy on 8/25. However, pt was found to be in TLS with associated nausea and fatigue on 8/23; at this time rasburicase was started and lowered the uric acid from 13 to 6, however, pt developed rasburicase-triggered G6PD hemolytic anemia with associated jaundice and dark colored urine as well as hemoglobin of 5.7 and hypotension on 8/26 requiring further evaluation. During hosp pt had CHF as well. started on iv bumex.  had echo revealing e 20%.     1- CKD III   2- CHF   3- Tumor lysis   4- HTN       cr in in steady range   pt is quite fluid overloaded still therefore needs diuretics at present. this is difficult scenario. pt requires ivf due to TLS however will be unable to tolerate given his chf  for now cont with diuretics  trend cr and lytes very closely and will decide on ivf based on pt condition   in addition also to have pro bnp   cont hydralazine 10 mg tid   increase allopurinol to 200 mg daily   strict I/O  keep O> I   d/w chf team and onc teams

## 2022-08-29 NOTE — DIETITIAN INITIAL EVALUATION ADULT - OTHER INFO
Pt unsure of UBW but does endorse weight loss.  Dosing wt: 169 lbs (08-26, stated)  Wt history per chart: 170 lbs (08-22), 176 lbs (08-18), 175 lbs (08-09), 199 lbs (08-02, ?accuracy secondary to large discrepancy), 177 lbs (07-07), 185 lbs (02-01), 192 lbs (10/18/21), 194 lbs (07/28/21). Indicates 8.7% wt loss x 6 months - not clinically significant at this time. RD to continue to monitor weight trends as able/available.     Per chart, pt currently ordered for admelog SSI, prednisone, Bumex, and atorvastatin in-house.

## 2022-08-29 NOTE — PROGRESS NOTE ADULT - PROBLEM SELECTOR PLAN 5
- p/w acute hypotension with MAPs < 65  - MICU consulted with recommendations to give albumin 5% 250 cc bolus, pt s/p  cc bolus (caution i/s/o HF)  - currently HDS  - f/u repeat TTE - p/w acute hypotension with MAPs < 65  - MICU consulted with recommendations to give albumin 5% 250 cc bolus, pt s/p  cc bolus (caution i/s/o HF)  - currently HDS  - TTE shows HFrEF as above - h/o HFrEF EF 45%, diffuse LV hypokinesis, mod pulmHTN; followed by cardiology outpt  - p/w ARGUETA, on bipap, imaging c/f pleural effusion  - proBNP 67k compared to 28K in 2019   - MICU bedside pocus w/diffuse B lines and pleural effusion  - troponin uptrending  -  TTE (8/27) ef=20%, severe MR, mild LV enlargement, normal RA, RV enlargement with decreased RVSF, mild-mod tricuspid regurg, moderate pulm pressures, b/l pleural effusions   - Appreciate HF recs  - c/w metoprolol tartrate 12.5mg BID (for discharge consider metoprolol succinate vs. resuming home Carvedilol at lower dose a per Cards)  - C/W hYDRALAZINE 10MG q8h for afterload reduction  - consider adding entresto (in place of home lisinopril) and SGLT2-inhibitor if renal function tolerates

## 2022-08-29 NOTE — PROGRESS NOTE ADULT - SUBJECTIVE AND OBJECTIVE BOX
Surgery Progress Note    INTERVAl/SUBJECTIVE: No acute event overnight. Patient seen and examined in am rounds, found to be without acute distress. No complains.    Vital Signs Last 24 Hrs  T(C): 36.4 (29 Aug 2022 04:32), Max: 36.4 (28 Aug 2022 12:00)  T(F): 97.5 (29 Aug 2022 04:32), Max: 97.5 (28 Aug 2022 12:00)  HR: 80 (29 Aug 2022 04:32) (79 - 104)  BP: 102/73 (29 Aug 2022 04:32) (102/73 - 119/70)  BP(mean): --  RR: 18 (29 Aug 2022 04:32) (16 - 18)  SpO2: 95% (29 Aug 2022 04:32) (95% - 98%)    Parameters below as of 29 Aug 2022 04:32  Patient On (Oxygen Delivery Method): room air        Physical Exam:  General: Appears well, NAD  CHEST: breathing comfortably  CV: appears well perfused  Abdomen: soft, nontender, nondistended, no rebound or guarding  Extremities: Grossly symmetric  LN: palpable LNs in left axillary, neck; upper back mass around 4-5 cm    LABS:                        8.4    8.55  )-----------( 237      ( 29 Aug 2022 05:55 )             26.8     08-29    140  |  101  |  97<H>  ----------------------------<  102<H>  3.4<L>   |  25  |  1.79<H>    Ca    9.4      29 Aug 2022 05:52  Phos  3.0     08-29  Mg     2.0     08-29    TPro  7.4  /  Alb  3.6  /  TBili  2.5<H>  /  DBili  x   /  AST  20  /  ALT  13  /  AlkPhos  121<H>  08-28          INs and OUTs:    08-28-22 @ 07:01  -  08-29-22 @ 07:00  --------------------------------------------------------  IN: 540 mL / OUT: 525 mL / NET: 15 mL

## 2022-08-29 NOTE — PROGRESS NOTE ADULT - PROBLEM SELECTOR PLAN 4
s/p rasburicase  - uric acid 6.0, potassium stable   - now on allopurinol 100mg   - midnight labs wnl s/p rasburicase  - uric acid 13-->6, potassium stable   - now on allopurinol 100mg s/p rasburicase  - uric acid 13-->6, potassium stable   - now on allopurinol 100mg (increase to 200mg if uric acid levels rise as per Nephro)

## 2022-08-29 NOTE — PROGRESS NOTE ADULT - SUBJECTIVE AND OBJECTIVE BOX
PROGRESS NOTE:   Written by Raman Sandoval PGY-3    Patient is a 72y old  Male who presents with a chief complaint of Hemolysis (27 Aug 2022 07:40)      SUBJECTIVE / OVERNIGHT EVENTS:    ADDITIONAL REVIEW OF SYSTEMS:    MEDICATIONS  (STANDING):  allopurinol 100 milliGRAM(s) Oral daily  apixaban 5 milliGRAM(s) Oral every 12 hours  atorvastatin 40 milliGRAM(s) Oral at bedtime  buMETAnide Injectable 1 milliGRAM(s) IV Push two times a day  chlorhexidine 2% Cloths 1 Application(s) Topical daily  dextrose 5%. 1000 milliLiter(s) (50 mL/Hr) IV Continuous <Continuous>  dextrose 5%. 1000 milliLiter(s) (100 mL/Hr) IV Continuous <Continuous>  dextrose 50% Injectable 25 Gram(s) IV Push once  dextrose 50% Injectable 12.5 Gram(s) IV Push once  dextrose 50% Injectable 25 Gram(s) IV Push once  glucagon  Injectable 1 milliGRAM(s) IntraMuscular once  hydrALAZINE 10 milliGRAM(s) Oral every 8 hours  insulin lispro (ADMELOG) corrective regimen sliding scale   SubCutaneous Before meals and at bedtime  metoprolol tartrate 12.5 milliGRAM(s) Oral every 12 hours    MEDICATIONS  (PRN):  dextrose Oral Gel 15 Gram(s) Oral once PRN Blood Glucose LESS THAN 70 milliGRAM(s)/deciliter  melatonin 3 milliGRAM(s) Oral at bedtime PRN Insomnia      CAPILLARY BLOOD GLUCOSE      POCT Blood Glucose.: 137 mg/dL (28 Aug 2022 21:13)  POCT Blood Glucose.: 119 mg/dL (28 Aug 2022 16:29)  POCT Blood Glucose.: 126 mg/dL (28 Aug 2022 12:04)  POCT Blood Glucose.: 107 mg/dL (28 Aug 2022 07:51)    I&O's Summary    28 Aug 2022 07:01  -  29 Aug 2022 07:00  --------------------------------------------------------  IN: 540 mL / OUT: 525 mL / NET: 15 mL        PHYSICAL EXAM:  Vital Signs Last 24 Hrs  T(C): 36.4 (29 Aug 2022 04:32), Max: 36.4 (28 Aug 2022 12:00)  T(F): 97.5 (29 Aug 2022 04:32), Max: 97.5 (28 Aug 2022 12:00)  HR: 80 (29 Aug 2022 04:32) (79 - 104)  BP: 102/73 (29 Aug 2022 04:32) (102/73 - 119/70)  BP(mean): --  RR: 18 (29 Aug 2022 04:32) (16 - 18)  SpO2: 95% (29 Aug 2022 04:32) (95% - 98%)    Parameters below as of 29 Aug 2022 04:32  Patient On (Oxygen Delivery Method): room air        GENERAL: No acute distress, resting in bed  HEAD:  Atraumatic, Normocephalic  EYES: EOMI, PERRLA, conjunctiva and sclera clear  NECK: Supple, no lymphadenopathy, no JVD  CHEST/LUNG: +rales; No wheezes, or rhonchi  HEART: Regular rate and rhythm; No murmurs, rubs, or gallops  ABDOMEN: Soft, non-tender, non-distended; normal bowel sounds, no organomegaly  EXTREMITIES:  2+ peripheral pulses b/l, No clubbing, cyanosis, or edema  NEUROLOGY: A&O x 3, no focal deficits  SKIN: No rashes or lesions    LABS:                        8.4    8.55  )-----------( 237      ( 29 Aug 2022 05:55 )             26.8     08-29    140  |  101  |  97<H>  ----------------------------<  102<H>  3.4<L>   |  25  |  1.79<H>    Ca    9.4      29 Aug 2022 05:52  Phos  3.0     08-29  Mg     2.0     08-29    TPro  7.4  /  Alb  3.6  /  TBili  2.5<H>  /  DBili  x   /  AST  20  /  ALT  13  /  AlkPhos  121<H>  08-28              Culture - Blood (collected 26 Aug 2022 16:40)  Source: .Blood Blood-Peripheral  Preliminary Report (27 Aug 2022 23:01):    No growth to date.    Culture - Blood (collected 26 Aug 2022 16:22)  Source: .Blood Blood-Peripheral  Preliminary Report (27 Aug 2022 23:01):    No growth to date.        RADIOLOGY & ADDITIONAL TESTS:  Results Reviewed:   Imaging Personally Reviewed:  Electrocardiogram Personally Reviewed:    COORDINATION OF CARE:  Care Discussed with Consultants/Other Providers [Y/N]:  Prior or Outpatient Records Reviewed [Y/N]:   PROGRESS NOTE:   Written by Raman Sandoval PGY-3    Patient is a 72y old  Male who presents with a chief complaint of Hemolysis (27 Aug 2022 07:40)      SUBJECTIVE / OVERNIGHT EVENTS: No acute events overnight. Patient reports he feels confused, but has no other complaints.     ADDITIONAL REVIEW OF SYSTEMS: Patient denies chest pain, shortness of breath, abdominal pain, fevers, chills, constipation, diarrhea, leg swelling or pain.    MEDICATIONS  (STANDING):  allopurinol 100 milliGRAM(s) Oral daily  apixaban 5 milliGRAM(s) Oral every 12 hours  atorvastatin 40 milliGRAM(s) Oral at bedtime  buMETAnide Injectable 1 milliGRAM(s) IV Push two times a day  chlorhexidine 2% Cloths 1 Application(s) Topical daily  dextrose 5%. 1000 milliLiter(s) (50 mL/Hr) IV Continuous <Continuous>  dextrose 5%. 1000 milliLiter(s) (100 mL/Hr) IV Continuous <Continuous>  dextrose 50% Injectable 25 Gram(s) IV Push once  dextrose 50% Injectable 12.5 Gram(s) IV Push once  dextrose 50% Injectable 25 Gram(s) IV Push once  glucagon  Injectable 1 milliGRAM(s) IntraMuscular once  hydrALAZINE 10 milliGRAM(s) Oral every 8 hours  insulin lispro (ADMELOG) corrective regimen sliding scale   SubCutaneous Before meals and at bedtime  metoprolol tartrate 12.5 milliGRAM(s) Oral every 12 hours    MEDICATIONS  (PRN):  dextrose Oral Gel 15 Gram(s) Oral once PRN Blood Glucose LESS THAN 70 milliGRAM(s)/deciliter  melatonin 3 milliGRAM(s) Oral at bedtime PRN Insomnia      CAPILLARY BLOOD GLUCOSE      POCT Blood Glucose.: 137 mg/dL (28 Aug 2022 21:13)  POCT Blood Glucose.: 119 mg/dL (28 Aug 2022 16:29)  POCT Blood Glucose.: 126 mg/dL (28 Aug 2022 12:04)  POCT Blood Glucose.: 107 mg/dL (28 Aug 2022 07:51)    I&O's Summary    28 Aug 2022 07:01  -  29 Aug 2022 07:00  --------------------------------------------------------  IN: 540 mL / OUT: 525 mL / NET: 15 mL        PHYSICAL EXAM:  Vital Signs Last 24 Hrs  T(C): 36.4 (29 Aug 2022 04:32), Max: 36.4 (28 Aug 2022 12:00)  T(F): 97.5 (29 Aug 2022 04:32), Max: 97.5 (28 Aug 2022 12:00)  HR: 80 (29 Aug 2022 04:32) (79 - 104)  BP: 102/73 (29 Aug 2022 04:32) (102/73 - 119/70)  BP(mean): --  RR: 18 (29 Aug 2022 04:32) (16 - 18)  SpO2: 95% (29 Aug 2022 04:32) (95% - 98%)    Parameters below as of 29 Aug 2022 04:32  Patient On (Oxygen Delivery Method): room air        GENERAL: No acute distress, resting in bed  HEAD:  Atraumatic, Normocephalic  EYES: EOMI, PERRLA, conjunctiva and sclera clear  NECK: Supple, no lymphadenopathy, +JVD  CHEST/LUNG: +rales; No wheezes, or rhonchi  HEART: Regular rate and rhythm; No murmurs  ABDOMEN: Soft, non-tender, non-distended; normal bowel sounds  EXTREMITIES:  2+ peripheral pulses b/l, No LE edema  NEUROLOGY: A&O x 3, no focal deficits  SKIN: No rashes or lesions    LABS:                        8.4    8.55  )-----------( 237      ( 29 Aug 2022 05:55 )             26.8     08-29    140  |  101  |  97<H>  ----------------------------<  102<H>  3.4<L>   |  25  |  1.79<H>    Ca    9.4      29 Aug 2022 05:52  Phos  3.0     08-29  Mg     2.0     08-29    TPro  7.4  /  Alb  3.6  /  TBili  2.5<H>  /  DBili  x   /  AST  20  /  ALT  13  /  AlkPhos  121<H>  08-28              Culture - Blood (collected 26 Aug 2022 16:40)  Source: .Blood Blood-Peripheral  Preliminary Report (27 Aug 2022 23:01):    No growth to date.    Culture - Blood (collected 26 Aug 2022 16:22)  Source: .Blood Blood-Peripheral  Preliminary Report (27 Aug 2022 23:01):    No growth to date.        RADIOLOGY & ADDITIONAL TESTS:  Results Reviewed:   Imaging Personally Reviewed:  Electrocardiogram Personally Reviewed:    COORDINATION OF CARE:  Care Discussed with Consultants/Other Providers [Y/N]:  Prior or Outpatient Records Reviewed [Y/N]:

## 2022-08-29 NOTE — DIETITIAN INITIAL EVALUATION ADULT - REASON INDICATOR FOR ASSESSMENT
RD consult for Unintentional Weight Loss PTA.   Source: pt (noted as confused, therefore limited information obtained), medical record. Chart reviewed, events noted.

## 2022-08-29 NOTE — DIETITIAN INITIAL EVALUATION ADULT - ETIOLOGY
not meeting estimated needs PTA in setting of poor appetite increased physiological demand for nutrients

## 2022-08-29 NOTE — PROGRESS NOTE ADULT - PROBLEM SELECTOR PLAN 10
- fsg q6, pending A1c, ISS History of Aflutter with CHB s/p Micra pacemaker then s/p MDT CRT-P upgrade 9/30/19  - EP consulted for interrogation 8/29/22 History of Aflutter with CHB s/p Micra pacemaker then s/p MDT CRT-P upgrade 9/30/19  - EP consulted for ICD interrogation 8/29/22

## 2022-08-29 NOTE — DIETITIAN INITIAL EVALUATION ADULT - PERTINENT MEDS FT
MEDICATIONS  (STANDING):  allopurinol 100 milliGRAM(s) Oral daily  apixaban 5 milliGRAM(s) Oral every 12 hours  atorvastatin 40 milliGRAM(s) Oral at bedtime  buMETAnide Injectable 1 milliGRAM(s) IV Push two times a day  chlorhexidine 2% Cloths 1 Application(s) Topical daily  dextrose 5%. 1000 milliLiter(s) (50 mL/Hr) IV Continuous <Continuous>  dextrose 5%. 1000 milliLiter(s) (100 mL/Hr) IV Continuous <Continuous>  dextrose 50% Injectable 25 Gram(s) IV Push once  dextrose 50% Injectable 12.5 Gram(s) IV Push once  dextrose 50% Injectable 25 Gram(s) IV Push once  glucagon  Injectable 1 milliGRAM(s) IntraMuscular once  hydrALAZINE 10 milliGRAM(s) Oral every 8 hours  insulin lispro (ADMELOG) corrective regimen sliding scale   SubCutaneous Before meals and at bedtime  metoprolol tartrate 12.5 milliGRAM(s) Oral every 12 hours  predniSONE   Tablet 100 milliGRAM(s) Oral daily    MEDICATIONS  (PRN):  dextrose Oral Gel 15 Gram(s) Oral once PRN Blood Glucose LESS THAN 70 milliGRAM(s)/deciliter  melatonin 3 milliGRAM(s) Oral at bedtime PRN Insomnia

## 2022-08-29 NOTE — DIETITIAN INITIAL EVALUATION ADULT - PERTINENT LABORATORY DATA
08-29    140  |  101  |  97<H>  ----------------------------<  102<H>  3.4<L>   |  25  |  1.79<H>    Ca    9.4      29 Aug 2022 05:52  Phos  3.0     08-29  Mg     2.0     08-29    TPro  7.4  /  Alb  3.6  /  TBili  2.5<H>  /  DBili  x   /  AST  20  /  ALT  13  /  AlkPhos  121<H>  08-28    CAPILLARY BLOOD GLUCOSE  POCT Blood Glucose.: 111 mg/dL (29 Aug 2022 07:43)  POCT Blood Glucose.: 137 mg/dL (28 Aug 2022 21:13)  POCT Blood Glucose.: 119 mg/dL (28 Aug 2022 16:29)  POCT Blood Glucose.: 126 mg/dL (28 Aug 2022 12:04)

## 2022-08-29 NOTE — PROGRESS NOTE ADULT - ASSESSMENT
72 year old male with significant history of HTN, CKD stage II, complete heart block (PPM in place), HLD, HFrEF (45% in 2019), and DLBCL (tx with R-CHOP in 2003, with relapse in 2009 treated with BR) now with recently diagnosed grade 3 follicular lymphoma on 8/23 who presents with rasburicase associated G6PD hemolytic anemia in setting of TLS on 8/23 and AHRF.    72 year old male with significant history of HTN, CKD stage II, complete heart block (PPM in place), HLD, HFrEF (45% in 2019), and DLBCL (tx with R-CHOP in 2003, with relapse in 2009 treated with BR) now with recently diagnosed grade 3 follicular lymphoma on 8/23 who presents with rasburicase associated G6PD hemolytic anemia in setting of TLS on 8/23 and AHRF.     Patient is being medically optimized for excisional biopsy under generalized anesthesia for lymphoma workup.

## 2022-08-29 NOTE — PROGRESS NOTE ADULT - ATTENDING COMMENTS
RAVIN Skaggs is a patient with follicular lymphoma and glucose 6 phosphatase deficiency. He is recovering from treatment of uric acid elevation and we recommend use of allopurinol at dose of 300 mg as advised by renal service. He is no on dialysis and he is cleared form the hematology point of view for a lymph node biopsy. He has significant cardiovascular disease. Ejection fraction is 20 % and he is comfortable sitting in chair with no ankle edema and no dyspnea. Treatment is directed at improvement of congestive heart disease before consideration of chemotherapy treatment

## 2022-08-29 NOTE — PROGRESS NOTE ADULT - ASSESSMENT
72M with significant history of HTN, CKD stage II, complete heart block (PPM in place), HLD, HFrEF (45% in 2019; 20% during this admission), and DLBCL (tx with R-CHOP in 2003, with relapse in 2009 treated now with multiple areas of palpable lymphadenopathy with biopsies shown to be at least grade IIIA follicular lymphoma. Patient admitted with tumor lysis syndrome, which was treated with rasburicase and patient developed hemolysis due to G6PD deficiency.     Surgical Oncology consulted for surgical biopsy for definite diagnosis.    PLAN:    - no acute surgical intervention indicated    - troponin still trending up today  - please continue to optimize the patient medically   - will schedule OR for biopsy once optimized from cardiology and medicine team, please document medical/cardiac clearance   - pt will likely require a left cervical lymph node and/or left upper back mass biopsy during this admission to further direct care   - will follow   - Plan discussed with Attending, Dr. Tavarez       [facility]  [id number]  72M with significant history of HTN, CKD stage II, complete heart block (PPM in place), HLD, HFrEF (45% in 2019; 20% during this admission), and DLBCL (tx with R-CHOP in 2003, with relapse in 2009 treated now with multiple areas of palpable lymphadenopathy with biopsies shown to be at least grade IIIA follicular lymphoma. Patient admitted with tumor lysis syndrome, which was treated with rasburicase and patient developed hemolysis due to G6PD deficiency.     Surgical Oncology consulted for surgical biopsy for definite diagnosis.    PLAN:    - no acute surgical intervention indicated    - troponin still trending up today  - please continue to optimize the patient medically   - will schedule OR for biopsy once optimized from cardiology and medicine team, please document medical/cardiac clearance   - pt will likely require a left cervical lymph node or left axillary nodes or left upper back mass biopsy during this admission to further direct care   - will follow   - Plan discussed with Attending, Dr. Tavarez       [facility]  [id number]

## 2022-08-29 NOTE — DIETITIAN INITIAL EVALUATION ADULT - REASON FOR ADMISSION
Tumor lysis syndrome    "72 year old male with significant history of HTN, CKD stage II, complete heart block (PPM in place), HLD, HFrEF (45% in 2019), and DLBCL (tx with R-CHOP in 2003, with relapse in 2009 treated with BR) now with recently diagnosed grade 3 follicular lymphoma on 8/23 who presents with rasburicase associated G6PD hemolytic anemia in setting of TLS on 8/23 and AHRF."

## 2022-08-29 NOTE — PROGRESS NOTE ADULT - PROBLEM SELECTOR PLAN 7
- h/o HFrEF EF 45%, diffuse LV hypokinesis, mod pulmHTN; followed by cardiology outpt  - p/w ARGUETA, on bipap, imaging c/f pleural effusion  - proBNP 67k compared to 28K in 2019   - MICU bedside pocus w/diffuse B lines and pleural effusion  - troponin 225 -> 243 -> 270  - f/u TTE, compare to 2019  - continue to hold carvedilol, f/u cardiology in AM - h/o HFrEF EF 45%, diffuse LV hypokinesis, mod pulmHTN; followed by cardiology outpt  - p/w ARGUETA, on bipap, imaging c/f pleural effusion  - proBNP 67k compared to 28K in 2019   - MICU bedside pocus w/diffuse B lines and pleural effusion  - troponin uptrending  -  TTE (8/27) ef=20%, severe MR, mild LV enlargement, normal RA, RV enlargement with decreased RVSF, mild-mod tricuspid regurg, moderate pulm pressures, b/l pleural effusions   - Appreciate HF recs  - c/w metoprolol tartrate 12.5mg BID (for discharge consider metoprolol succinate vs. resuming home Carvedilol at lower dose a per Cards)  - C/W hYDRALAZINE 10MG q8h for afterload reduction  - consider adding entresto (in place of home lisinopril) and SGLT2-inhibitor if renal function tolerates - possibly i/s/o medication induced QT prolongation  - EKG 8/26 1638, rate 100 bpm,  ms, QTc 691 ms, ventricular paced  - EKG 8/26 2100, rate 94 bpm, Ekaterina 156, , QTc 547 ms, ventricular paced   - continue to monitor with EKGs

## 2022-08-29 NOTE — PROGRESS NOTE ADULT - SUBJECTIVE AND OBJECTIVE BOX
Interval History:  NAEON     Medications:  allopurinol 100 milliGRAM(s) Oral daily  atorvastatin 40 milliGRAM(s) Oral at bedtime  buMETAnide Injectable 1 milliGRAM(s) IV Push two times a day  chlorhexidine 2% Cloths 1 Application(s) Topical daily  dextrose 5%. 1000 milliLiter(s) IV Continuous <Continuous>  dextrose 5%. 1000 milliLiter(s) IV Continuous <Continuous>  dextrose 50% Injectable 25 Gram(s) IV Push once  dextrose 50% Injectable 12.5 Gram(s) IV Push once  dextrose 50% Injectable 25 Gram(s) IV Push once  dextrose Oral Gel 15 Gram(s) Oral once PRN  glucagon  Injectable 1 milliGRAM(s) IntraMuscular once  hydrALAZINE 10 milliGRAM(s) Oral every 8 hours  insulin lispro (ADMELOG) corrective regimen sliding scale   SubCutaneous Before meals and at bedtime  melatonin 3 milliGRAM(s) Oral at bedtime PRN  metoprolol tartrate 12.5 milliGRAM(s) Oral every 12 hours    Vitals:  T(C): 36.7 (08-29-22 @ 13:35), Max: 36.7 (08-29-22 @ 13:35)  HR: 95 (08-29-22 @ 13:35) (79 - 95)  BP: 114/70 (08-29-22 @ 13:35) (102/73 - 120/77)  BP(mean): --  RR: 18 (08-29-22 @ 13:35) (18 - 18)  SpO2: 97% (08-29-22 @ 13:35) (95% - 99%)    Daily       I&O's Summary    28 Aug 2022 07:01  -  29 Aug 2022 07:00  --------------------------------------------------------  IN: 540 mL / OUT: 525 mL / NET: 15 mL    29 Aug 2022 07:01  -  29 Aug 2022 17:19  --------------------------------------------------------  IN: 480 mL / OUT: 0 mL / NET: 480 mL        Physical Exam:  Appearance: No Acute Distress  HEENT: PERRL  Neck: +JVD   Cardiovascular: RRR, + Murmur   Respiratory: bi basilar crackles   Gastrointestinal: Soft, Non-tender	  Skin: No cyanosis	  Neurologic: Non-focal  Extremities: +edema   Psychiatry: A & O x 3, Mood & affect appropriate    Labs:                        9.2    10.36 )-----------( 259      ( 29 Aug 2022 12:40 )             29.4     08-29    138  |  98  |  93<H>  ----------------------------<  115<H>  3.7   |  27  |  1.75<H>    Ca    9.6      29 Aug 2022 12:40  Phos  3.0     08-29  Mg     2.0     08-29    TPro  7.4  /  Alb  3.6  /  TBili  2.5<H>  /  DBili  x   /  AST  20  /  ALT  13  /  AlkPhos  121<H>  08-28

## 2022-08-29 NOTE — PROGRESS NOTE ADULT - PROBLEM SELECTOR PLAN 6
- possibly i/s/o medication induced QT prolongation  - EKG 8/26 1638, rate 100 bpm,  ms, QTc 691 ms, ventricular paced  - EKG 8/26 2100, rate 94 bpm, Ekaterina 156, , QTc 547 ms, ventricular paced   - EKG 8/27 QTc 527  - f/u EKG - possibly i/s/o medication induced QT prolongation  - EKG 8/26 1638, rate 100 bpm,  ms, QTc 691 ms, ventricular paced  - EKG 8/26 2100, rate 94 bpm, Ekaterina 156, , QTc 547 ms, ventricular paced   - continue to monitor with EKGs - p/w acute hypotension with MAPs < 65, in the setting of HFrEF as above  - MICU consulted with recommendations to give albumin 5% 250 cc bolus, pt s/p  cc bolus (caution i/s/o HF)  - currently HDS  - TTE shows worsening HFrEF as above

## 2022-08-29 NOTE — CONSULT NOTE ADULT - SUBJECTIVE AND OBJECTIVE BOX
seen and examined.  increase allopurinol to 200 mg daily as uric acid level rises. cont diureses for chf  full consult to follow  seen and examined.  increase allopurinol to 200 mg daily as uric acid level rises. cont diureses for chf  full consult to follow     [facility]  [phone]  NEPHROLOGY      INITIAL CONSULT NOTE  --------------------------------------------------------------------------------  HPI:      72 year old male with significant history of HTN, CKD stage III, complete heart block (PPM in place), HLD, HFrEF (45% in 2019), and DLBCL (tx with R-CHOP in 2003, with relapse in 2009 treated with BR) now with recently diagnosed grade 3 follicular lymphoma on 8/23 who presents with anemia and SOB, patient was pending a surgical biopsy on 8/25. However, pt was found to be in TLS with associated nausea and fatigue on 8/23; at this time rasburicase was started and lowered the uric acid from 13 to 6, however, pt developed rasburicase-triggered G6PD hemolytic anemia with associated jaundice and dark colored urine as well as hemoglobin of 5.7 and hypotension on 8/26 requiring further evaluation. In ED, the pt received  pRBC The pt was also hypotensive and tachypneic with respiratory acidosis. MICU was consulted at bedside and recommend caution dose of  cc bolus and albumin with slight improvement in MAPs to be > 65 mmHg. Regarding the pt's respiratory distress, he was escalated to BiPAP. pt during hosp with CHF as well. started on iv bumex. given abn creatinine and TLS. renal  consult called.     Echo:  8/27/22: LVEF 20%, decreased RV function, severe MR    =======================================================    Vonnie (daughter): [phone] - takes all healthcare related calls.         PAST HISTORY  --------------------------------------------------------------------------------  PAST MEDICAL & SURGICAL HISTORY:  Non-Hodgkins Lymphoma  In [facility] for &gt; 10 years      DM type 2 (diabetes mellitus, type 2)  Never on insulin      Essential hypertension      Rhinitis, allergic      Systolic heart failure      Moderate mitral regurgitation      Pleural effusion      Atrial flutter, unspecified type      Impaired memory      Non-Hodgkin lymphoma  h/o axillary dissection      Cardiac pacemaker        FAMILY HISTORY:  Family history of CHF (congestive heart failure)  Mother    Family history of non-Hodgkin&#x27;s lymphoma (Sibling)      PAST SOCIAL HISTORY:    ALLERGIES & MEDICATIONS  --------------------------------------------------------------------------------  Allergies    No Known Allergies    Intolerances      Standing Inpatient Medications  allopurinol 100 milliGRAM(s) Oral daily  atorvastatin 40 milliGRAM(s) Oral at bedtime  buMETAnide Injectable 2 milliGRAM(s) IV Push two times a day  chlorhexidine 2% Cloths 1 Application(s) Topical daily  dextrose 5%. 1000 milliLiter(s) IV Continuous <Continuous>  dextrose 5%. 1000 milliLiter(s) IV Continuous <Continuous>  dextrose 50% Injectable 25 Gram(s) IV Push once  dextrose 50% Injectable 12.5 Gram(s) IV Push once  dextrose 50% Injectable 25 Gram(s) IV Push once  glucagon  Injectable 1 milliGRAM(s) IntraMuscular once  hydrALAZINE 10 milliGRAM(s) Oral every 8 hours  insulin lispro (ADMELOG) corrective regimen sliding scale   SubCutaneous Before meals and at bedtime  metoprolol tartrate 12.5 milliGRAM(s) Oral every 12 hours    PRN Inpatient Medications  dextrose Oral Gel 15 Gram(s) Oral once PRN  melatonin 3 milliGRAM(s) Oral at bedtime PRN      REVIEW OF SYSTEMS  --------------------------------------------------------------------------------  Gen: No  fevers/chills   Skin: No itching   Head/Eyes/Ears/Mouth: No headache; Normal hearing;  No sinus pain/discomfort, sore throat  Respiratory:  + ARGUETA no  cough, wheezing, hemoptysis  CV: No chest pain, orthopnea  GI: No abdominal pain, diarrhea, nausea, vomiting, melena,  : No dysuria, decrease urination or hesitancy urinating  hematuria, nocturia  MSK: No joint pain/swelling; no back pain  Neuro: No dizziness/lightheadedness  also with no edema       VITALS/PHYSICAL EXAM  --------------------------------------------------------------------------------  T(C): 36.7 (08-29-22 @ 18:14), Max: 36.7 (08-29-22 @ 13:35)  HR: 91 (08-29-22 @ 18:14) (79 - 95)  BP: 119/71 (08-29-22 @ 18:14) (102/73 - 120/77)  RR: 18 (08-29-22 @ 18:14) (18 - 18)  SpO2: 98% (08-29-22 @ 18:14) (95% - 99%)  Wt(kg): --        08-28-22 @ 07:01  -  08-29-22 @ 07:00  --------------------------------------------------------  IN: 540 mL / OUT: 525 mL / NET: 15 mL    08-29-22 @ 07:01  -  08-29-22 @ 20:10  --------------------------------------------------------  IN: 480 mL / OUT: 150 mL / NET: 330 mL      Physical Exam:  	Gen: thin pale appearing   	+ JVD  	Pulm: decrease bs  no rales or ronchi or wheezing  	CV: RRR, S1S2; no rub  	Abd: +BS, soft, nontender/nondistended  	: No suprapubic tenderness  	UE: Warm, no cyanosis  no clubbing,  no edema  	LE: Warm, no cyanosis  no clubbing,  +  edema  	Neuro: alert and oriented. speech coherent   	Skin: Warm, no decrease skin turgor   	    LABS/STUDIES  --------------------------------------------------------------------------------              9.2    10.36 >-----------<  259      [08-29-22 @ 12:40]              29.4     138  |  98  |  93  ----------------------------<  115      [08-29-22 @ 12:40]  3.7   |  27  |  1.75        Ca     9.6     [08-29-22 @ 12:40]      Mg     2.0     [08-29-22 @ 12:40]      Phos  3.0     [08-29-22 @ 05:52]    TPro  7.4  /  Alb  3.6  /  TBili  2.5  /  DBili  x   /  AST  20  /  ALT  13  /  AlkPhos  121  [08-28-22 @ 10:32]        Uric acid 6.1      [08-29-22 @ 12:40]        [08-29-22 @ 12:40]    Creatinine Trend:  SCr 1.75 [08-29 @ 12:40]  SCr 1.79 [08-29 @ 05:52]  SCr 1.70 [08-28 @ 10:32]  SCr 1.82 [08-28 @ 00:39]  SCr 1.81 [08-27 @ 11:57]    Urinalysis - [05-10-19 @ 00:35]      Color Light Yellow / Appearance Clear / SG 1.012 / pH 6.0      Gluc Negative / Ketone Negative  / Bili Negative / Urobili Negative       Blood Moderate / Protein 30 mg/dL / Leuk Est Small / Nitrite Negative      RBC 1 / WBC 1 / Hyaline  / Gran  / Sq Epi  / Non Sq Epi 1 / Bacteria       HbA1c 6.0      [09-26-19 @ 08:44]    HIV Nonreact      [08-26-22 @ 17:39]    Syphilis Screen (Treponema Pallidum Ab) Negative      [05-08-19 @ 15:39]

## 2022-08-29 NOTE — DIETITIAN INITIAL EVALUATION ADULT - ADD RECOMMEND
1) Continue DASH, Consistent Carbohydrate diet.  2) Recommend add multivitamin and Glucerna 1x/day (chocolate), pending no medical contraindications. RD will also add Diet Mighty Shakes 2x/day to assist pt with meeting estimated needs.  3) Monitor PO intake, GI tolerance, skin integrity, labs, weight, and bowel movement regularity.   4) Honor food preferences as feasible. Assist with meals PRN and encourage PO intake.  5) RD remains available upon request and will follow-up per protocol.  6) malnutrition alert placed in chart

## 2022-08-29 NOTE — DIETITIAN INITIAL EVALUATION ADULT - NSICDXPASTMEDICALHX_GEN_ALL_CORE_FT
PAST MEDICAL HISTORY:  Atrial flutter, unspecified type     DM type 2 (diabetes mellitus, type 2) Never on insulin    Essential hypertension     Impaired memory     Moderate mitral regurgitation     Non-Hodgkins Lymphoma In [facility] for > 10 years    Pleural effusion     Rhinitis, allergic     Systolic heart failure

## 2022-08-29 NOTE — DIETITIAN NUTRITION RISK NOTIFICATION - TREATMENT: THE FOLLOWING DIET HAS BEEN RECOMMENDED
Diet, DASH/TLC:   Sodium & Cholesterol Restricted  Consistent Carbohydrate {Evening Snack} (CSTCHOSN) (08-27-22 @ 15:24) [Active]

## 2022-08-29 NOTE — PROGRESS NOTE ADULT - ASSESSMENT
72M hx DLBCL (tx with R-CHOP in 2003, with relapse in 2009 treated with BR) now with multiple areas of palpable lymphadenopathy with biopsies shown to be at least grade IIIA follicular lymphoma. Has had significant weight loss >20lbs in the last 6 months. Also noted to have an IgG lambda, IgG Kappa, and IgM Lambda monoclonal gammopathies. Prior to being sent to the ED his labs were suggestive of TLS with UA 13.7 s/p 3mg rasburicase on 8/23.      #Follicular Lymphoma  #Hemolytic Anemia  Follows with Dr. Cordova at [facility]. PET/CT 8/2022 with FDG avid cervical, b/l supraclavicular, chest and a few abdominal LNs, intramuscular masses in the left posterior chest and left upper thigh, scattered FDG avid subcutaneous soft tissue nodules with trace right pleural effusion and moderate left pleural effusion with a loculated fluid in the RML, splenomegaly. There were areas of > 20 SUV on the PET-CT, repeat whole lymph node biopsy was requested to confirm suspected diagnosis of Grade 3B FL or transformed large cell lymphoma however patient presented with evidence of TLS and now with nausea/fatigue sent to the hospital for further evaluation. s/p biopsy of both cervical and left axillary LNs which showed grade 3A follicular lymphoma positive for BCL6 (3q27) breakpoint translocation and negative for MYC Rearrangement or BCL2-IGH gene rearrangement [translocation t(14;18) present in ~ 85% of FL].     - outpatient had evidence of TLS with labs outpatient UA 13.7. G6PD resulted 4.8  - Initial , indirect bili 1.8, hapto < 20  - Peripheral smear was reviewed; no degmacytes observed  - likely has rasburicase related hemolysis, would transfuse to keep Hb >8   - Due to renal function, please reduce allopurinol to 100mg qd  - IVF   - Trend CBC, CMP, hemolysis labs daily  - Methemoglobin wnl 0.2%, no need for vitamin C  - Started prednisone 100mg daily x5 days with intent to start Obinutuzumab-COEP inpatient  - Will discuss with primary hematologist regarding need for excisional biopsy  - Please place double-lumen PICC line given intent to start treatment inpatient    Blayne Barnett MD  Hematology/Oncology Fellow PGY-4  Pager: [facility] [phone] / [facility] [phone]   After 5pm and on weekends please page on-call fellow  72M hx DLBCL (tx with R-CHOP in 2003, with relapse in 2009 treated with BR) now with multiple areas of palpable lymphadenopathy with biopsies shown to be at least grade IIIA follicular lymphoma. Has had significant weight loss >20lbs in the last 6 months. Also noted to have an IgG lambda, IgG Kappa, and IgM Lambda monoclonal gammopathies. Prior to being sent to the ED his labs were suggestive of TLS with UA 13.7 s/p 3mg rasburicase on 8/23.      #Follicular Lymphoma  #Hemolytic Anemia  Follows with Dr. Cordova at [facility]. PET/CT 8/2022 with FDG avid cervical, b/l supraclavicular, chest and a few abdominal LNs, intramuscular masses in the left posterior chest and left upper thigh, scattered FDG avid subcutaneous soft tissue nodules with trace right pleural effusion and moderate left pleural effusion with a loculated fluid in the RML, splenomegaly. There were areas of > 20 SUV on the PET-CT, repeat whole lymph node biopsy was requested to confirm suspected diagnosis of Grade 3B FL or transformed large cell lymphoma however patient presented with evidence of TLS and now with nausea/fatigue sent to the hospital for further evaluation. s/p biopsy of both cervical and left axillary LNs which showed grade 3A follicular lymphoma positive for BCL6 (3q27) breakpoint translocation and negative for MYC Rearrangement or BCL2-IGH gene rearrangement [translocation t(14;18) present in ~ 85% of FL].     - outpatient had evidence of TLS with labs outpatient UA 13.7. G6PD resulted 4.8  - Initial , indirect bili 1.8, hapto < 20  - Peripheral smear was reviewed; no degmacytes observed  - likely has rasburicase related hemolysis, would transfuse to keep Hb >8   - Due to renal function, please reduce allopurinol to 100mg qd  - IVF   - Trend CBC, CMP, hemolysis labs daily  - Methemoglobin wnl 0.2%, no need for vitamin C  - Started prednisone 100mg daily x5 days with intent to start Obinutuzumab-COEP inpatient  - Discussed with primary hematologist regarding excisional biopsy. Prefer to have definitive diagnosis prior to treatment; currently only have a presumed diagnosis. However, would like to avoid delaying treatment so please have biopsy done as soon as possible  - Please place double-lumen PICC line given intent to start treatment inpatient    Blayne Barnett MD  Hematology/Oncology Fellow PGY-4  Pager: [facility] [phone] / [place] [phone]   After 5pm and on weekends please page on-call fellow

## 2022-08-29 NOTE — PROGRESS NOTE ADULT - ATTENDING COMMENTS
Feels well. Denies complaints. Reports good diuresis with bumex although weight and I/O are not well documented. On exam, NAD, JVP elevated approx 12-14 cm, systolic murmur in L axilla, CTAB, nontender abdomen, trace edema. Labs reviewed - BUN/Cr 98/1.8 (stable).   - c/w diuresis as above  - c/w hydral 10 mg q8h  - on lopressor; eventual transition to toprol xl  - awaiting biopsy of LN under general anesthesia however patient not current optimized as is still decompensated and may require ischemic evaluation given positive troponins and severe LV dysfunction  - prognosis guarded

## 2022-08-29 NOTE — PROVIDER CONTACT NOTE (OTHER) - ASSESSMENT
Patient A&O2, confused, disoriented to time and situation. Patient Alert and able to make needs known. Patient denies CP, SOB. VSS. Neuro check WDL.  Per daughter Vonnie patient has a history of having periods of confusion when in the hospital.

## 2022-08-29 NOTE — DIETITIAN INITIAL EVALUATION ADULT - PHYSCIAL ASSESSMENT
Visual nutrition-focused physical examination conducted as pt confused, unable to provide proper verbal consent. Visual findings noted.

## 2022-08-29 NOTE — PROGRESS NOTE ADULT - PROBLEM SELECTOR PLAN 8
- h/o grade 3 follicular lymphoma, pt was pending surgical biopsy on 8/23, however in heme/onc outpt labs s/f TLS with uric acid 13.7, pt was started rasburicase on 8/23 and developed rasburicase-triggered G6PD hemolysis with hgb 5.7 on 8/26  - UA 6.4 from 13.7 s/p rasburicase on 8/23  - trend hemolysis labs   - per heme recommendations 8/26:   -- continue with allopurinol daily   - follow-up with heme on 8/27      - "if infectious workup negative, nontoxic appearing and otherwise stable plan for steroids (100mg prednisone daily x5 days along with inpatient chemotherapy with O-COEP" - h/o grade 3 follicular lymphoma, pt was pending surgical biopsy on 8/23, however in heme/onc outpt labs s/f TLS with uric acid 13.7, pt was started rasburicase on 8/23 and developed rasburicase-triggered G6PD hemolysis with hgb 5.7 on 8/26  - UA 6.4 from 13.7 s/p rasburicase on 8/23  - trend hemolysis labs   - per heme recommendations 8/26:   -- continue with allopurinol daily   - follow-up with heme on 8/27      - "if infectious workup negative, nontoxic appearing and otherwise stable plan for steroids (100mg prednisone daily x5 days along with inpatient chemotherapy with O-COEP"  - Surgery consulted for biopsy for definitive diagnosis, recommend further medical optimization prior to biopsy

## 2022-08-29 NOTE — PROGRESS NOTE ADULT - PROBLEM SELECTOR PLAN 3
- EKG with prolonged QTc 500s from 600s   - repeat EKG with 527  - troponin 225 at 8/26 2300  - no CP at this time, elevated troponin possibly 2/2 to demand ischemia   - repeat trops with minor uptrend  - trend trops to peak - Trop T 225-->476, suspected demand ischemia in setting of non-obstructive CAD  - EKG with prolonged QTc 500s from 600s   - trend trops to peak

## 2022-08-29 NOTE — PHYSICAL THERAPY INITIAL EVALUATION ADULT - PERTINENT HX OF CURRENT PROBLEM, REHAB EVAL
72 y.o. M HTN, CKD stage II, complete heart block (PPM in place), HLD, HFrEF (45% in 2019), & DLBCL (tx with R-CHOP in 2003, with relapse in 2009 treated with BR) now w/ recently diagnosed grade 3 follicular lymphoma on 8/23, was pending a surgical biopsy on 8/25. However, pt was found to be in TLS with associated nausea and fatigue on 8/23; at this time rasburicase was started and lowered the uric acid from 13 to 6, however, pt developed rasburicase-triggered G6PD hemolytic anemia with associated jaundice and dark colored urine as well as hemoglobin of 5.7 and hypotension on 8/26 requiring further evaluation. In ED, the pt received up to 2 units of pRBC with improvement in hemoglobin to 6.1 from 5.7 s/p 1st unit and improvement to hgb 7.1 s/p 2nd unit. The pt was also hypotensive and tachypneic with respiratory acidosis. MICU was consulted at bedside and recommend caution dose of  cc bolus and albumin with slight improvement in MAPs to be > 65 mmHg. Regarding the pt's respiratory distress, he was escalated to BiPAP. Hospital course complicated by EKG with QTc prolongation and elevated troponin, magnesium replaced. Pt admitted to medicine, he reports improvement in his breathing on bipap.

## 2022-08-29 NOTE — DIETITIAN INITIAL EVALUATION ADULT - ENERGY INTAKE
Fair (50-75%) Pt reports fair PO intake in-house, flowsheets indicate the same. Is amenable to trial of Glucerna 1/day (chocolate).

## 2022-08-29 NOTE — PROGRESS NOTE ADULT - PROBLEM SELECTOR PLAN 1
Presenting with respiratory distress possibly 2/2 acute hemolytic anemia and HF exacerbation vs. ACS r/o (pt with known chronic right sided loculated effusion). Started on BiPAP at 11 ipap/5 epap  - overnight POCUS - Chest with bilateral B lines. Left pleural base consolidation with small pleural effusion. Right anterior B lines and possible consolidation of R anterior mid lobe  - now off BiPAP on NC at 3L saturating well   - f/u TTE  - could potentially benefit from diuresis in setting of possible pleural effusion but also with TLS, improving labs will c/w trend Presenting with respiratory distress possibly 2/2 acute hemolytic anemia and HF exacerbation vs. ACS r/o (pt with known chronic right sided loculated effusion). Started on BiPAP at 11 ipap/5 epap  - overnight POCUS - Chest with bilateral B lines. Left pleural base consolidation with small pleural effusion. Right anterior B lines and possible consolidation of R anterior mid lobe  - now off BiPAP on NC at 3L saturating well   - bumex 1mg BID, monitor I/O Presenting with respiratory distress possibly 2/2 acute hemolytic anemia and HF exacerbation vs. ACS r/o (pt with known chronic right sided loculated effusion). Started on BiPAP at 11 ipap/5 epap  - overnight POCUS - Chest with bilateral B lines. Left pleural base consolidation with small pleural effusion. Right anterior B lines and possible consolidation of R anterior mid lobe  - now off BiPAP on room air saturating well   - bumex 1mg BID, monitor I/O

## 2022-08-29 NOTE — PROVIDER CONTACT NOTE (OTHER) - ASSESSMENT
Patient A&O2, alert. No SXS of distress noted. Patient Alert and able to make needs known. Patient denies CP, SOB. VSS. Neuro check WDL. Patient A&O2, alert. No SXS of distress noted. Patient Alert and able to make needs known. Patient denies CP, SOB. Neuro check WDL. Patient returned to Paced rhythm HR 80s without intervention. VSS.

## 2022-08-29 NOTE — PROGRESS NOTE ADULT - ATTENDING COMMENTS
71yo M w/ extensive PMHx including DLBCL, G6PD deficiency, complete heart block s/p PPM, HFrEF (45%), CKD presents with anemia, pt was treated for TLS syndrome w/ rasburicase on 8/23 and current episode concerning for an acute hemolytic anemia in setting of G6PD deficiency, pt appears clinically euvolemic at this time and reports breathing comfortably downtitrated from Bipap to RA.    #Hemolytic anemia  -s/p transfusion, counts stable, continue to trend CBC  -Downtrending hemolysis labs  -G6PD level pending  -c/w A/C (eliquis)  -heme following    #Follicular lymphoma  #TLS  -q12 TLS labs   -consult renal  -started on prednisone per heme - will ask if patient needs the surgical biopsy still  -requested picc line  -c/w IVF, start diuresis given volume and concurrent heart failure. Bumex 1mg BID  -renally dose allopurinol    #acute on chronic systolic heart failure  -heart failure recs appreciated  -Low dose afterload reduction w/ hydral 10 TID PO  -switched coreg to metoprolol due to soft BPs, will add entresto per HF recs  -Call EP for ICD interrogation  -may need ischemic workup prior to dc, f/u with cards  -echo shows new EF of 20% (previously 45%)  -serial EKGs

## 2022-08-29 NOTE — DIETITIAN INITIAL EVALUATION ADULT - SIGNS/SYMPTOMS
pt with HF (protein, energy), CKD2 (energy) and follicular lymphoma (protein, energy) </=75% EER >/= 1 month, moderate fat/muscle loss, 8.7% wt loss x 6 months

## 2022-08-29 NOTE — DIETITIAN INITIAL EVALUATION ADULT - NSFNSGIIOFT_GEN_A_CORE
Denies nausea, vomiting, constipation, diarrhea. Reports last BM 8/28. Pt not currently on bowel regimen.

## 2022-08-29 NOTE — PROGRESS NOTE ADULT - PROBLEM SELECTOR PLAN 2
presenting with G6PD hemolysis (low hapto, high LDH/alk/bili) s/p rasburicase for TLS on 8/23 w/hgb 5 on 8/26 (hgb 9 on 8/23)  - d/c rasburicase in setting of G6PD  - pt s/p 2u pRBC 8/26, post transfusion CBC s/p 1st unity = 6; s/p 2nd unit = 7; s/p 3rd unit = 8.2  - Hg 7.3 O/N 8/28, transfuse >8 presenting with G6PD hemolysis (low hapto, high LDH/alk/bili) s/p rasburicase for TLS on 8/23 w/hgb 5 on 8/26 (hgb 9 on 8/23)  - d/c rasburicase in setting of G6PD  - pt s/p total 4 units pRBC presenting with G6PD hemolysis (low hapto, high LDH/alk/bili) s/p rasburicase for TLS on 8/23 w/hgb 5 on 8/26 (hgb 9 on 8/23)  - d/c rasburicase in setting of G6PD  - pt s/p total 4 units pRBC  - trend hgb q12h for now, transfuse to maintain hgb>7

## 2022-08-29 NOTE — PHYSICAL THERAPY INITIAL EVALUATION ADULT - ADDITIONAL COMMENTS
Pt resides in an elevator apartment w/ daughter, no steps to enter. PTA pt was independent w/ all functional mobility & did not use an AD for ambulation.

## 2022-08-29 NOTE — PROGRESS NOTE ADULT - PROBLEM SELECTOR PLAN 1
Patient still overloaded on exam and not optimized for procedure with general anesthesia   -Would switch to bumex 2 mg IV BID   -C/w Hydralazine 10 mg q8h   -C/w metoprolol tartrate 12.5 BID

## 2022-08-29 NOTE — PROGRESS NOTE ADULT - ASSESSMENT
73 yo M w/ PMH CHB s/p PPM upgraded to CRT-P, HFrEF 2/2 chemo, DLBCL s/p chemo, HTN, nonobstructive CAD, Aflutter on apixaban who presented w/ SOB and anemia. 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 He was started on rasburicase for TLS but with notable G6PD hemolytics anemia. Was told to come into the ED and given 3u PRBC in total so far with improvement of anemia. He was also started on BiPAP given his tachypnea. CXRAY showed R pleural effusion with known R middle lobe opacity found to be in heart failure exacerbation.         Echo:  8/27/22: LVEF 20%, decreased RV function, severe MR  11/7/2019: LVEF 40%, moderate pulmonary hypertension, enlarged LA size, severe mitral regurgitation    Cardiac Cath: 5/2019, 70% 1st diagonal, 60% distal circumflex, otherwise no occlusive disease      Pacemaker: 9/30/2019, [employer] [id number] BiV DDDR  71 yo M w/ PMH CHB s/p PPM upgraded to CRT-P, HFrEF 2/2 chemo, DLBCL s/p chemo, HTN, nonobstructive CAD, Aflutter on apixaban, CKD (b/l Cr 1.8-2) who presented w/ SOB and anemia. 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 He was started on rasburicase for TLS but with notable G6PD hemolytic anemia. Was told to come into the ED and given 3u PRBC in total so far with improvement of anemia. He was also started on BiPAP given his tachypnea. CXRAY showed R pleural effusion with known R middle lobe opacity found to be in heart failure exacerbation. Undergoing diuresis with some improvement. Found to have severe MR and moderate TR with EF 15-20%.         Echo:  8/27/22: LVEF 20%, decreased RV function, severe MR  11/7/2019: LVEF 40%, moderate pulmonary hypertension, enlarged LA size, severe mitral regurgitation    Cardiac Cath: 5/2019, 70% 1st diagonal, 60% distal circumflex, otherwise no occlusive disease      Pacemaker: 9/30/2019, [employer] [id number] BiV DDDR

## 2022-08-29 NOTE — DIETITIAN INITIAL EVALUATION ADULT - NSFNSADHERENCEPTAFT_GEN_A_CORE
Per chart: pt with hx of DM2. Denies taking medication for it, reports checking his fingersticks 1x/day, unable to recall typical readings. A1c 5.8% (08/20) indicates good glycemic control vs inadequate PO intake.

## 2022-08-29 NOTE — DIETITIAN INITIAL EVALUATION ADULT - OTHER CALCULATIONS
Fluid needs deferred to team. Energy, protein needs based on dosing wt: 76.7 kg (08-26) with consideration for HF, follicular lymphoma, and CKD2.

## 2022-08-29 NOTE — PROGRESS NOTE ADULT - PROBLEM SELECTOR PLAN 9
- afib, on home AC  - v paced on tele  - restarted AC at Eliquis 5mg - afib, on home AC  - v paced on tele  - ICD interrogation  - restarted AC at Eliquis 5mg - afib, on home AC  - v paced on tele  - ICD interrogation  - holding Eliquis 5mg prior to PICC placement

## 2022-08-29 NOTE — DIETITIAN INITIAL EVALUATION ADULT - ORAL INTAKE PTA/DIET HISTORY
Reports no appetite PTA, fair-poor PO intake, was not following any therapeutic diet. Confirms NKFA.

## 2022-08-29 NOTE — DIETITIAN INITIAL EVALUATION ADULT - PERSON TAUGHT/METHOD
Pt noted as confused, therefore limited education able to be provided.   Discussed importance of adequate protein-energy consumption to meet estimated nutrient needs. Encouraged intake of meals and oral nutrition supplements as tolerated. Encouraged intake of protein-rich foods first at mealtimes to help preserve lean muscle mass. Reviewed food choices that are high in protein. Pt noted with good comprehension and made aware RD remains available for further questions/concerns./verbal instruction/patient instructed

## 2022-08-30 LAB
ALBUMIN SERPL ELPH-MCNC: 4 G/DL — SIGNIFICANT CHANGE UP (ref 3.3–5)
ALP SERPL-CCNC: 168 U/L — HIGH (ref 40–120)
ALT FLD-CCNC: 15 U/L — SIGNIFICANT CHANGE UP (ref 10–45)
ANION GAP SERPL CALC-SCNC: 18 MMOL/L — HIGH (ref 5–17)
APTT BLD: 26.9 SEC — LOW (ref 27.5–35.5)
AST SERPL-CCNC: 23 U/L — SIGNIFICANT CHANGE UP (ref 10–40)
BILIRUB SERPL-MCNC: 1.4 MG/DL — HIGH (ref 0.2–1.2)
BUN SERPL-MCNC: 98 MG/DL — HIGH (ref 7–23)
CALCIUM SERPL-MCNC: 9.5 MG/DL — SIGNIFICANT CHANGE UP (ref 8.4–10.5)
CHLORIDE SERPL-SCNC: 100 MMOL/L — SIGNIFICANT CHANGE UP (ref 96–108)
CO2 SERPL-SCNC: 24 MMOL/L — SIGNIFICANT CHANGE UP (ref 22–31)
CREAT SERPL-MCNC: 1.78 MG/DL — HIGH (ref 0.5–1.3)
EGFR: 40 ML/MIN/1.73M2 — LOW
GLUCOSE BLDC GLUCOMTR-MCNC: 183 MG/DL — HIGH (ref 70–99)
GLUCOSE BLDC GLUCOMTR-MCNC: 217 MG/DL — HIGH (ref 70–99)
GLUCOSE BLDC GLUCOMTR-MCNC: 219 MG/DL — HIGH (ref 70–99)
GLUCOSE BLDC GLUCOMTR-MCNC: 288 MG/DL — HIGH (ref 70–99)
GLUCOSE SERPL-MCNC: 202 MG/DL — HIGH (ref 70–99)
HAPTOGLOB SERPL-MCNC: 118 MG/DL — SIGNIFICANT CHANGE UP (ref 34–200)
HAV IGM SER-ACNC: SIGNIFICANT CHANGE UP
HBV CORE AB SER-ACNC: SIGNIFICANT CHANGE UP
HBV CORE IGM SER-ACNC: SIGNIFICANT CHANGE UP
HBV CORE IGM SER-ACNC: SIGNIFICANT CHANGE UP
HBV SURFACE AG SER-ACNC: SIGNIFICANT CHANGE UP
HCT VFR BLD CALC: 28.3 % — LOW (ref 39–50)
HCV AB S/CO SERPL IA: 0.21 S/CO — SIGNIFICANT CHANGE UP (ref 0–0.99)
HCV AB SERPL-IMP: SIGNIFICANT CHANGE UP
HGB BLD-MCNC: 8.6 G/DL — LOW (ref 13–17)
LDH SERPL L TO P-CCNC: 488 U/L — HIGH (ref 50–242)
MAGNESIUM SERPL-MCNC: 2.1 MG/DL — SIGNIFICANT CHANGE UP (ref 1.6–2.6)
MCHC RBC-ENTMCNC: 26.1 PG — LOW (ref 27–34)
MCHC RBC-ENTMCNC: 30.4 GM/DL — LOW (ref 32–36)
MCV RBC AUTO: 85.8 FL — SIGNIFICANT CHANGE UP (ref 80–100)
NRBC # BLD: 0 /100 WBCS — SIGNIFICANT CHANGE UP (ref 0–0)
PHOSPHATE SERPL-MCNC: 4.1 MG/DL — SIGNIFICANT CHANGE UP (ref 2.5–4.5)
PLATELET # BLD AUTO: 270 K/UL — SIGNIFICANT CHANGE UP (ref 150–400)
POTASSIUM SERPL-MCNC: 4.5 MMOL/L — SIGNIFICANT CHANGE UP (ref 3.5–5.3)
POTASSIUM SERPL-SCNC: 4.5 MMOL/L — SIGNIFICANT CHANGE UP (ref 3.5–5.3)
PROT SERPL-MCNC: 8 G/DL — SIGNIFICANT CHANGE UP (ref 6–8.3)
RBC # BLD: 3.3 M/UL — LOW (ref 4.2–5.8)
RBC # FLD: 18.6 % — HIGH (ref 10.3–14.5)
SODIUM SERPL-SCNC: 142 MMOL/L — SIGNIFICANT CHANGE UP (ref 135–145)
URATE SERPL-MCNC: 6.9 MG/DL — SIGNIFICANT CHANGE UP (ref 3.4–8.8)
WBC # BLD: 12.06 K/UL — HIGH (ref 3.8–10.5)
WBC # FLD AUTO: 12.06 K/UL — HIGH (ref 3.8–10.5)

## 2022-08-30 PROCEDURE — 71045 X-RAY EXAM CHEST 1 VIEW: CPT | Mod: 26

## 2022-08-30 PROCEDURE — 99233 SBSQ HOSP IP/OBS HIGH 50: CPT | Mod: GC

## 2022-08-30 PROCEDURE — 93010 ELECTROCARDIOGRAM REPORT: CPT

## 2022-08-30 RX ORDER — ALLOPURINOL 300 MG
300 TABLET ORAL DAILY
Refills: 0 | Status: DISCONTINUED | OUTPATIENT
Start: 2022-08-30 | End: 2022-08-31

## 2022-08-30 RX ORDER — HEPARIN SODIUM 5000 [USP'U]/ML
3000 INJECTION INTRAVENOUS; SUBCUTANEOUS EVERY 6 HOURS
Refills: 0 | Status: DISCONTINUED | OUTPATIENT
Start: 2022-08-30 | End: 2022-08-31

## 2022-08-30 RX ORDER — SODIUM CHLORIDE 9 MG/ML
10 INJECTION INTRAMUSCULAR; INTRAVENOUS; SUBCUTANEOUS
Refills: 0 | Status: DISCONTINUED | OUTPATIENT
Start: 2022-08-30 | End: 2022-09-07

## 2022-08-30 RX ORDER — HEPARIN SODIUM 5000 [USP'U]/ML
INJECTION INTRAVENOUS; SUBCUTANEOUS
Qty: 25000 | Refills: 0 | Status: DISCONTINUED | OUTPATIENT
Start: 2022-08-30 | End: 2022-08-31

## 2022-08-30 RX ORDER — HEPARIN SODIUM 5000 [USP'U]/ML
6500 INJECTION INTRAVENOUS; SUBCUTANEOUS ONCE
Refills: 0 | Status: COMPLETED | OUTPATIENT
Start: 2022-08-30 | End: 2022-08-30

## 2022-08-30 RX ORDER — CHLORHEXIDINE GLUCONATE 213 G/1000ML
1 SOLUTION TOPICAL
Refills: 0 | Status: DISCONTINUED | OUTPATIENT
Start: 2022-08-30 | End: 2022-09-07

## 2022-08-30 RX ORDER — HEPARIN SODIUM 5000 [USP'U]/ML
6500 INJECTION INTRAVENOUS; SUBCUTANEOUS EVERY 6 HOURS
Refills: 0 | Status: DISCONTINUED | OUTPATIENT
Start: 2022-08-30 | End: 2022-08-31

## 2022-08-30 RX ADMIN — Medication 2: at 17:10

## 2022-08-30 RX ADMIN — Medication 3: at 22:37

## 2022-08-30 RX ADMIN — HEPARIN SODIUM 6500 UNIT(S): 5000 INJECTION INTRAVENOUS; SUBCUTANEOUS at 23:57

## 2022-08-30 RX ADMIN — Medication 12.5 MILLIGRAM(S): at 05:30

## 2022-08-30 RX ADMIN — BUMETANIDE 2 MILLIGRAM(S): 0.25 INJECTION INTRAMUSCULAR; INTRAVENOUS at 13:49

## 2022-08-30 RX ADMIN — Medication 300 MILLIGRAM(S): at 12:41

## 2022-08-30 RX ADMIN — ATORVASTATIN CALCIUM 40 MILLIGRAM(S): 80 TABLET, FILM COATED ORAL at 22:35

## 2022-08-30 RX ADMIN — Medication 100 MILLIGRAM(S): at 05:31

## 2022-08-30 RX ADMIN — Medication 2: at 12:41

## 2022-08-30 RX ADMIN — Medication 10 MILLIGRAM(S): at 05:30

## 2022-08-30 RX ADMIN — BUMETANIDE 2 MILLIGRAM(S): 0.25 INJECTION INTRAMUSCULAR; INTRAVENOUS at 05:31

## 2022-08-30 RX ADMIN — Medication 10 MILLIGRAM(S): at 13:47

## 2022-08-30 RX ADMIN — Medication 12.5 MILLIGRAM(S): at 18:25

## 2022-08-30 RX ADMIN — Medication 1 TABLET(S): at 12:41

## 2022-08-30 RX ADMIN — Medication 1: at 07:38

## 2022-08-30 RX ADMIN — Medication 10 MILLIGRAM(S): at 22:35

## 2022-08-30 RX ADMIN — CHLORHEXIDINE GLUCONATE 1 APPLICATION(S): 213 SOLUTION TOPICAL at 12:41

## 2022-08-30 NOTE — PROGRESS NOTE ADULT - ASSESSMENT
72 year old male with significant history of HTN, CKD stage III, complete heart block (PPM in place), HLD, HFrEF (45% in 2019), and DLBCL (tx with R-CHOP in 2003, with relapse in 2009 treated with BR) now with recently diagnosed grade 3 follicular lymphoma on 8/23 who presents with anemia and SOB, patient was pending a surgical biopsy on 8/25. However, pt was found to be in TLS with associated nausea and fatigue on 8/23; at this time rasburicase was started and lowered the uric acid from 13 to 6, however, pt developed rasburicase-triggered G6PD hemolytic anemia with associated jaundice and dark colored urine as well as hemoglobin of 5.7 and hypotension on 8/26 requiring further evaluation. During hosp pt had CHF as well. started on iv bumex.  had echo revealing e 20%.     1- CKD III   2- CHF   3- Tumor lysis   4- HTN       cr in in steady range   pt is quite fluid overloaded still therefore needs diuretics at present. for now cont with diuretics  given LDH seems to have been steady range as well as haptoglobin in normal range; suspect he is at baseline hemolysis from his underlying disease   will hold ivf   cont hydralazine 10 mg tid   \allopurinol to 300 mg daily   strict I/O  keep O> I   d/w pt daughter

## 2022-08-30 NOTE — PROGRESS NOTE ADULT - PROBLEM SELECTOR PLAN 7
- p/w acute hypotension with MAPs < 65, in the setting of HFrEF as above  - MICU consulted with recommendations to give albumin 5% 250 cc bolus, pt s/p  cc bolus (caution i/s/o HF)  - currently HDS  - TTE shows worsening HFrEF as above

## 2022-08-30 NOTE — PROGRESS NOTE ADULT - PROBLEM SELECTOR PLAN 6
Presenting with respiratory distress possibly 2/2 acute hemolytic anemia and HF exacerbation vs. ACS r/o (pt with known chronic right sided loculated effusion). Started on BiPAP at 11 ipap/5 epap  - now off BiPAP on room air saturating well, OOB  - bumex 2mg BID, -600cc 8/30

## 2022-08-30 NOTE — PROGRESS NOTE ADULT - PROBLEM SELECTOR PLAN 1
presenting with G6PD hemolysis 2/2 rasburicase (low hapto, high LDH/alk/bili) s/p rasburicase for TLS on 8/23 w/hgb 5 on 8/26 (hgb 9 on 8/23)  - pt s/p total 4 units pRBC  - trend hgb q12h for now, transfuse to maintain hgb>7 s/p rasburicase  - uric acid 13-->6, potassium stable   - now on allopurinol 300mg as per nephro to prevent uric acid from increasing again

## 2022-08-30 NOTE — PROGRESS NOTE ADULT - PROBLEM SELECTOR PLAN 4
- h/o grade 3 follicular lymphoma, pt was pending surgical biopsy on 8/23, however in heme/onc outpt labs s/f TLS with uric acid 13.7, pt was started rasburicase on 8/23 and developed rasburicase-triggered G6PD hemolysis with hgb 5.7 on 8/26  - UA 6.4 from 13.7 s/p rasburicase on 8/23  - trend hemolysis labs   - per heme recommendations 8/26:       - continue with allopurinol daily   - follow-up with heme on 8/29:      - Started prednisone 100mg daily x5 days with intent to start Obinutuzumab-COEP inpatient      - pending double-lumen PICC placement  - Surgery consulted for biopsy for definitive diagnosis, recommend further medical optimization prior to biopsy - h/o grade 3 follicular lymphoma, pt was pending surgical biopsy on 8/23, however in heme/onc outpt labs s/f TLS with uric acid 13.7, pt was started rasburicase on 8/23 and developed rasburicase-triggered G6PD hemolysis with hgb 5.7 on 8/26  - UA 6.4 from 13.7 s/p rasburicase on 8/23  - trend hemolysis labs   - per heme recommendations 8/26:       - continue with allopurinol daily   - follow-up with heme on 8/29:      - Started prednisone 100mg daily x5 days with intent to start Obinutuzumab-COEP inpatient 8/30      - pending double-lumen PICC placement, holding Eliquis for 24hrs in anticipation   - Surgery consulted for biopsy for definitive diagnosis      - as per HF team, patient still volume overloaded and now cleared medically for biopsy      - will f/u AC, will we need to start Eliquis again until patient is medically cleared or otherwise?      - still off Eliquis as of today for PICC placement presenting with G6PD hemolysis 2/2 rasburicase (low hapto, high LDH/alk/bili) s/p rasburicase for TLS on 8/23 w/hgb 5 on 8/26 (hgb 9 on 8/23)  - pt s/p total 4 units pRBC  - trend hgb q12h for now, transfuse to maintain hgb>7

## 2022-08-30 NOTE — PROGRESS NOTE ADULT - ATTENDING COMMENTS
71yo M w/ extensive PMHx including DLBCL, G6PD deficiency, complete heart block s/p PPM, HFrEF (new EF 20%), CKD 3b, paroxysmal atrial fibrillation, presents with anemia, pt was treated for TLS syndrome w/ rasburicase on 8/23 and current episode concerning for an acute hemolytic anemia in setting of G6PD deficiency. Patient also p/w acute hypoxic respiratory failure due to heart failure exacerbation requiring bipap initially. He was eventually titrate off bipap and nasal cannula, breathing comfortably on room air after initiation of diuretics. Patient is planned for eventual surgical biopsy but not yet medically optimized.    #Hemolytic anemia  -s/p transfusion, counts stable, continue to trend CBC  -Downtrending hemolysis labs  -G6PD level pending  -c/w A/C (eliquis switched to hep gtt in anticipation of bx)  -heme following    #Follicular lymphoma  #TLS  -q12 TLS labs   -consulted renal  -started on prednisone per heme - plan to do inpatient treatment soon  -requested picc line  -cont allopurinol at higher dose    #acute on chronic systolic heart failure  -heart failure recs appreciated  -Low dose afterload reduction w/ hydral 10 TID PO  -switched coreg to metoprolol due to soft BPs, will add entresto per HF recs  -cont bumex 2mg BID  -may need ischemic workup prior to dc, f/u with cards  -echo shows new EF of 20% (previously 45%)

## 2022-08-30 NOTE — PROGRESS NOTE ADULT - SUBJECTIVE AND OBJECTIVE BOX
Surgery Progress Note    INTERVAl/SUBJECTIVE: No acute event overnight. Patient seen and examined in am rounds, found to be without acute distress.      Vital Signs Last 24 Hrs  T(C): 36.4 (30 Aug 2022 04:28), Max: 36.7 (29 Aug 2022 13:35)  T(F): 97.5 (30 Aug 2022 04:28), Max: 98.1 (29 Aug 2022 13:35)  HR: 84 (30 Aug 2022 04:28) (82 - 95)  BP: 112/65 (30 Aug 2022 04:28) (111/67 - 120/77)  BP(mean): --  RR: 18 (30 Aug 2022 04:28) (18 - 18)  SpO2: 97% (30 Aug 2022 04:28) (96% - 99%)    Parameters below as of 30 Aug 2022 04:28  Patient On (Oxygen Delivery Method): room air        Physical Exam:  General: Appears well, NAD  CHEST: breathing comfortably  CV: appears well perfused  Abdomen: soft, nontender, nondistended, no rebound or guarding  Extremities: Grossly symmetric  LN: palpable LNs in left axillary, neck; upper back masses    LABS:                        9.2    10.36 )-----------( 259      ( 29 Aug 2022 12:40 )             29.4     08-29    138  |  98  |  93<H>  ----------------------------<  115<H>  3.7   |  27  |  1.75<H>    Ca    9.6      29 Aug 2022 12:40  Phos  3.0     08-29  Mg     2.0     08-29    TPro  7.4  /  Alb  3.6  /  TBili  2.5<H>  /  DBili  x   /  AST  20  /  ALT  13  /  AlkPhos  121<H>  08-28          INs and OUTs:    08-29-22 @ 07:01  -  08-30-22 @ 07:00  --------------------------------------------------------  IN: 480 mL / OUT: 150 mL / NET: 330 mL

## 2022-08-30 NOTE — PROGRESS NOTE ADULT - PROBLEM SELECTOR PLAN 9
- afib, on home AC  - v paced on tele  - ICD interrogation wnl  - holding Eliquis 5mg prior to PICC placement - afib, on home AC  - v paced on tele  - ICD interrogation wnl  - on heparin gtt

## 2022-08-30 NOTE — PROGRESS NOTE ADULT - ASSESSMENT
72M with significant history of HTN, CKD stage II, complete heart block (PPM in place), HLD, HFrEF (45% in 2019; 20% during this admission), and DLBCL (tx with R-CHOP in 2003, with relapse in 2009 treated now with multiple areas of palpable lymphadenopathy with biopsies shown to be at least grade IIIA follicular lymphoma. Patient admitted with tumor lysis syndrome, which was treated with rasburicase and patient developed hemolysis due to G6PD deficiency.     Surgical Oncology consulted for surgical biopsy for definite diagnosis.    PLAN:    - no acute surgical intervention indicated    - pt will likely require a left cervical lymph node or left axillary nodes or left upper back mass biopsy during this admission to further direct care   - Will schedule OR for biopsy once optimized from cardiology and medicine team, Please document medical/cardiac clearance.  - Please recall when patient is optimized for surgical biopsy.   - Plan discussed with Attending, Dr. Tavarez       [facility]  [id number]    normal...

## 2022-08-30 NOTE — PHYSICAL EXAM
[Restricted in physically strenuous activity but ambulatory and able to carry out work of a light or sedentary nature] : Status 1- Restricted in physically strenuous activity but ambulatory and able to carry out work of a light or sedentary nature, e.g., light house work, office work [Normal] : no JVD, no calf tenderness, venous stasis changes, varices [de-identified] : enlarged thyroid, multiple palpable LNs [de-identified] : No edema in LE [de-identified] : Multiple left and right cervical lymph nodes at multiple levels, left posterior cervical and submandibular are most prominent > 2 cm, left lower inguinal mass, bilateral axillary LNs that are each > 2 cm in size. Left axillary is 1cm. Bilateral inguinal lymph nodes 1cm.  [de-identified] : 4 cm and its widest dimension mass on the left -central upper posterior shoulder.

## 2022-08-30 NOTE — PROGRESS NOTE ADULT - ASSESSMENT
72 year old male with significant history of HTN, CKD stage II, complete heart block (PPM in place), HLD, HFrEF (45% in 2019), and DLBCL (tx with R-CHOP in 2003, with relapse in 2009 treated with BR) now with recently diagnosed grade 3 follicular lymphoma on 8/23 who presents with rasburicase associated G6PD hemolytic anemia in setting of TLS on 8/23 and AHRF.     Patient is being medically optimized for excisional biopsy under generalized anesthesia for lymphoma workup.

## 2022-08-30 NOTE — ASSESSMENT
[FreeTextEntry1] : 73 yo male with PMHx significant for follicular lymphoma with DLBCL (tx with R-CHOP in 2003, with relapse in 2009, treated with BR) now with multiple areas of palpable lymphadenopathy at this point shown to be at least follicular lymphoma grade IIIA in the setting of a steady decline in weight > 20 lbs in the last 6 months without other constitutional complaints. 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 \par \par He underwent PET-CT in Aug 2022 which showed FDG avid lymph nodes in the left cervical, bilateral supraclavicular regions, and chest, and a few FDG avid abdominal lymph nodes, intramuscular masses in the left posterior chest and left upper thigh, scattered FDG avid subcutaneous soft tissue/nodules with trace right pleural effusion, moderate left pleural effusion, loculated fluid in the right middle lobe. Moderate abdominal and pelvic ascites. Subcutaneous edema in the lower abdominal wall extending into the imaged bilateral thighs. Splenomegaly with mild FDG avidity, suspicious for lymphomatous involvement.\par \par Prior to the PET-CT, he underwent biopsy of both a cervical lymph node and a left axilla lymph node. 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 There were areas of > 20 SUV on the PET-CT, repeat whole lymph node biopsy was requested to confirm suspected diagnosis of Grade 3B FL or transformed large cell lymphoma.\par \par CBC today: WBC 7.63 (ANC 6.24), Hb 9.7, Plt 179\par \par Plan:\par - Repeat Lymphoma labs today, PT/PTT\par - At this time he at least Grade 3A follicular lymphoma, plan was to await whole LN biopsy of most FDG-avid LNs\par - Discussed possibility of treatment using O-COEP (dose-adjusted) with patient and daughter Vonnie. Consent obtained today. \par - Regimen consists of a modified regimen of mini-COEP plus Obinutuzumab: 400 mg/m² cyclophosphamide, Etoposide (40% dose reduced to 30 mg/m2 IV on day 1 and 60 mg/m2 PO on days 2 and 3 of each cycle), and 2 mg vincristine (flat dose) on day 1 of each cycle, and 100 mg prednisone on days 1–5. \par - Follow up in 1 week after repeat biopsy\par \par ___\par I personally have spent a total of 40 minutes of time on the date of this encounter reviewing test results, documenting findings, providing education, coordinating care and directly consulting with the patient and/or designated family member. \par

## 2022-08-30 NOTE — PROGRESS NOTE ADULT - PROBLEM SELECTOR PLAN 2
Unclear etiology, chemotherapy related vs direct toxicity vs. heart failure exacerbation. Troponins peaked.

## 2022-08-30 NOTE — PROGRESS NOTE ADULT - NSPROGADDITIONALINFOA_GEN_ALL_CORE
F: as needed  E: as needed  N: DASH diet    DVT: no AC pending PICC placement, off Eliquis for 24hrs, d/c 8/29 AM    Dispo: outpatient PT recommended F: as needed  E: as needed  N: DASH diet    DVT: on heparin gtt    Dispo: outpatient PT recommended

## 2022-08-30 NOTE — PROGRESS NOTE ADULT - PROBLEM SELECTOR PLAN 1
Patient still overloaded on exam and not optimized for procedure with general anesthesia   -Would switch to bumex 2 mg IV BID   -C/w Hydralazine 10 mg q8h   -C/w metoprolol tartrate 12.5 BID. Patient still overloaded on exam and not optimized for procedure with general anesthesia   -Would c/w bumex 2 mg IV BID   -C/w Hydralazine 10 mg q8h   -C/w metoprolol tartrate 12.5 BID  -Please obtain bladder scan to r/o obstruction, can communicate with me via teams with results

## 2022-08-30 NOTE — PROGRESS NOTE ADULT - ATTENDING COMMENTS
Feels well. Denies complaints. No significnat change in weight. On exam, NAD, JVP elevated approx 12-14 cm, systolic murmur in L axilla, CTAB, nontender abdomen, trace edema. Labs reviewed - BUN/Cr 98/1.8 (stable).   - c/w diuresis as above  - c/w hydral 10 mg q8h  - on lopressor; eventual transition to toprol xl  - awaiting biopsy of LN under general anesthesia however patient not current optimized as is still decompensated and may require ischemic evaluation given positive troponins and severe LV dysfunction  - prognosis guarded

## 2022-08-30 NOTE — PROGRESS NOTE ADULT - SUBJECTIVE AND OBJECTIVE BOX
[facility]   [phone]  RENAL FOLLOW UP NOTE  --------------------------------------------------------------------------------  Chief Complaint:    24 hour events/subjective:    seen earlier   states no worsening sob     PAST HISTORY  --------------------------------------------------------------------------------  No significant changes to PMH, PSH, FHx, SHx, unless otherwise noted    ALLERGIES & MEDICATIONS  --------------------------------------------------------------------------------  Allergies    No Known Allergies    Intolerances      Standing Inpatient Medications  allopurinol 300 milliGRAM(s) Oral daily  atorvastatin 40 milliGRAM(s) Oral at bedtime  buMETAnide Injectable 2 milliGRAM(s) IV Push two times a day  chlorhexidine 2% Cloths 1 Application(s) Topical daily  chlorhexidine 4% Liquid 1 Application(s) Topical <User Schedule>  dextrose 5%. 1000 milliLiter(s) IV Continuous <Continuous>  dextrose 5%. 1000 milliLiter(s) IV Continuous <Continuous>  dextrose 50% Injectable 25 Gram(s) IV Push once  dextrose 50% Injectable 12.5 Gram(s) IV Push once  dextrose 50% Injectable 25 Gram(s) IV Push once  glucagon  Injectable 1 milliGRAM(s) IntraMuscular once  heparin   Injectable 6500 Unit(s) IV Push once  heparin  Infusion.  Unit(s)/Hr IV Continuous <Continuous>  hydrALAZINE 10 milliGRAM(s) Oral every 8 hours  insulin lispro (ADMELOG) corrective regimen sliding scale   SubCutaneous Before meals and at bedtime  metoprolol tartrate 12.5 milliGRAM(s) Oral every 12 hours  multivitamin 1 Tablet(s) Oral daily  predniSONE   Tablet 100 milliGRAM(s) Oral daily    PRN Inpatient Medications  dextrose Oral Gel 15 Gram(s) Oral once PRN  heparin   Injectable 6500 Unit(s) IV Push every 6 hours PRN  heparin   Injectable 3000 Unit(s) IV Push every 6 hours PRN  melatonin 3 milliGRAM(s) Oral at bedtime PRN  sodium chloride 0.9% lock flush 10 milliLiter(s) IV Push every 1 hour PRN      REVIEW OF SYSTEMS  --------------------------------------------------------------------------------    Gen: denies  fevers/chills,  CVS: denies chest pain/palpitations  Resp: denies worsening SOB/Cough  GI: Denies N/V/Abd pain  : Denies dysuria/oliguria/hematuria      VITALS/PHYSICAL EXAM  --------------------------------------------------------------------------------  T(C): 36.3 (08-30-22 @ 19:49), Max: 36.7 (08-30-22 @ 13:45)  HR: 86 (08-30-22 @ 19:49) (77 - 88)  BP: 115/72 (08-30-22 @ 19:49) (107/70 - 115/72)  RR: 18 (08-30-22 @ 19:49) (18 - 18)  SpO2: 98% (08-30-22 @ 19:49) (97% - 99%)  Wt(kg): --        08-29-22 @ 07:01  -  08-30-22 @ 07:00  --------------------------------------------------------  IN: 480 mL / OUT: 150 mL / NET: 330 mL    08-30-22 @ 07:01  -  08-30-22 @ 21:37  --------------------------------------------------------  IN: 720 mL / OUT: 750 mL / NET: -30 mL      Physical Exam:  	    Gen: thin pale appearing   	+ JVD  	Pulm: decrease bs  no rales or ronchi or wheezing  	CV: RRR, S1S2; no rub  	Abd: +BS, soft, nontender/nondistended  	: No suprapubic tenderness  	UE: Warm, no cyanosis  no clubbing,  no edema  	LE: Warm, no cyanosis  no clubbing,  +  edema  	Neuro: alert and oriented. speech coherent       LABS/STUDIES  --------------------------------------------------------------------------------              8.6    12.06 >-----------<  270      [08-30-22 @ 11:15]              28.3     142  |  100  |  98  ----------------------------<  202      [08-30-22 @ 11:15]  4.5   |  24  |  1.78        Ca     9.5     [08-30-22 @ 11:15]      Mg     2.1     [08-30-22 @ 11:15]      Phos  4.1     [08-30-22 @ 11:15]    TPro  8.0  /  Alb  4.0  /  TBili  1.4  /  DBili  x   /  AST  23  /  ALT  15  /  AlkPhos  168  [08-30-22 @ 11:15]      PTT: 26.9       [08-30-22 @ 19:22]    Uric acid 6.9      [08-30-22 @ 11:15]        [08-30-22 @ 11:15]    Creatinine Trend:  SCr 1.78 [08-30 @ 11:15]  SCr 1.75 [08-29 @ 12:40]  SCr 1.79 [08-29 @ 05:52]  SCr 1.70 [08-28 @ 10:32]  SCr 1.82 [08-28 @ 00:39]                  HbA1c 6.0      [09-26-19 @ 08:44]

## 2022-08-30 NOTE — PROGRESS NOTE ADULT - PROBLEM SELECTOR PLAN 8
- possibly i/s/o medication induced QT prolongation  - EKG 8/26 1638, rate 100 bpm,  ms, QTc 691 ms, ventricular paced  - EKG 8/26 2100, rate 94 bpm, Ekaterina 156, , QTc 547 ms, ventricular paced   - continue to monitor with EKGs - possibly i/s/o medication induced QT prolongation  - EKG 8/26 1638, rate 100 bpm,  ms, QTc 691 ms, ventricular paced  - EKG 8/26 2100, rate 94 bpm, Ekaterina 156, , QTc 547 ms, ventricular paced   - EKG 8/30 QTc 525ms, ventricular paced

## 2022-08-30 NOTE — PROGRESS NOTE ADULT - SUBJECTIVE AND OBJECTIVE BOX
Progress Note    08-27-22 (3d)    Patient is a 72y old  Male who presents with a chief complaint of Dyspnea (29 Aug 2022 12:47)      Subjective / Overnight Events :  - No acute events overnight.  - Pt seen and examined at bedside.     Additional ROS (if any):    MEDICATIONS  (STANDING):  allopurinol 100 milliGRAM(s) Oral daily  atorvastatin 40 milliGRAM(s) Oral at bedtime  buMETAnide Injectable 2 milliGRAM(s) IV Push two times a day  chlorhexidine 2% Cloths 1 Application(s) Topical daily  dextrose 5%. 1000 milliLiter(s) (50 mL/Hr) IV Continuous <Continuous>  dextrose 5%. 1000 milliLiter(s) (100 mL/Hr) IV Continuous <Continuous>  dextrose 50% Injectable 25 Gram(s) IV Push once  dextrose 50% Injectable 12.5 Gram(s) IV Push once  dextrose 50% Injectable 25 Gram(s) IV Push once  glucagon  Injectable 1 milliGRAM(s) IntraMuscular once  hydrALAZINE 10 milliGRAM(s) Oral every 8 hours  insulin lispro (ADMELOG) corrective regimen sliding scale   SubCutaneous Before meals and at bedtime  metoprolol tartrate 12.5 milliGRAM(s) Oral every 12 hours  predniSONE   Tablet 100 milliGRAM(s) Oral daily    MEDICATIONS  (PRN):  dextrose Oral Gel 15 Gram(s) Oral once PRN Blood Glucose LESS THAN 70 milliGRAM(s)/deciliter  melatonin 3 milliGRAM(s) Oral at bedtime PRN Insomnia          PHYSICAL EXAM:  Vital Signs Last 24 Hrs  T(C): 36.4 (30 Aug 2022 04:28), Max: 36.7 (29 Aug 2022 13:35)  T(F): 97.5 (30 Aug 2022 04:28), Max: 98.1 (29 Aug 2022 13:35)  HR: 84 (30 Aug 2022 04:28) (82 - 95)  BP: 112/65 (30 Aug 2022 04:28) (111/67 - 120/77)  BP(mean): --  RR: 18 (30 Aug 2022 04:28) (18 - 18)  SpO2: 97% (30 Aug 2022 04:28) (96% - 99%)    Parameters below as of 30 Aug 2022 04:28  Patient On (Oxygen Delivery Method): room air        I&O's Summary    29 Aug 2022 07:01  -  30 Aug 2022 07:00  --------------------------------------------------------  IN: 480 mL / OUT: 150 mL / NET: 330 mL        General: NAD, non-toxic appearing   HEENT: PERRLA, EOMi, no scleral icterus  CV: RRR, normal S1 and S2, no m/r/g  Lungs: normal respiratory effort. CTAB, no wheezes, rales, or rhonchi  Abd: soft, nontender, nondistended  Ext: no edema, 2+ peripheral pulses   Pysch: AAOx3, appropriate affect   Neuro: grossly non-focal, moving all extremities spontaneously   Skin: no rashes or lesions     LABS:  CAPILLARY BLOOD GLUCOSE      POCT Blood Glucose.: 232 mg/dL (29 Aug 2022 21:52)  POCT Blood Glucose.: 218 mg/dL (29 Aug 2022 16:43)  POCT Blood Glucose.: 121 mg/dL (29 Aug 2022 11:54)  POCT Blood Glucose.: 111 mg/dL (29 Aug 2022 07:43)                              9.2    10.36 )-----------( 259      ( 29 Aug 2022 12:40 )             29.4       WBC Trend: 10.36<--, 8.55<--, 8.29<--  Hb Trend: 9.2<--, 8.4<--, 8.4<--, 7.5<--, 7.3<--    08-29    138  |  98  |  93<H>  ----------------------------<  115<H>  3.7   |  27  |  1.75<H>    Ca    9.6      29 Aug 2022 12:40  Phos  3.0     08-29  Mg     2.0     08-29    TPro  7.4  /  Alb  3.6  /  TBili  2.5<H>  /  DBili  x   /  AST  20  /  ALT  13  /  AlkPhos  121<H>  08-28                  RADIOLOGY & ADDITIONAL TESTS: Reviewed Progress Note    08-27-22 (3d)    Patient is a 72y old  Male who presents with a chief complaint of Dyspnea (29 Aug 2022 12:47)      Subjective / Overnight Events :  - No acute events overnight.  - Pt seen and examined at bedside. Patient out of bed. No complaints, denies shortness of breath or chest pain. Is not aware of where he is or what is going on.     Additional ROS (if any):    MEDICATIONS  (STANDING):  allopurinol 100 milliGRAM(s) Oral daily  atorvastatin 40 milliGRAM(s) Oral at bedtime  buMETAnide Injectable 2 milliGRAM(s) IV Push two times a day  chlorhexidine 2% Cloths 1 Application(s) Topical daily  dextrose 5%. 1000 milliLiter(s) (50 mL/Hr) IV Continuous <Continuous>  dextrose 5%. 1000 milliLiter(s) (100 mL/Hr) IV Continuous <Continuous>  dextrose 50% Injectable 25 Gram(s) IV Push once  dextrose 50% Injectable 12.5 Gram(s) IV Push once  dextrose 50% Injectable 25 Gram(s) IV Push once  glucagon  Injectable 1 milliGRAM(s) IntraMuscular once  hydrALAZINE 10 milliGRAM(s) Oral every 8 hours  insulin lispro (ADMELOG) corrective regimen sliding scale   SubCutaneous Before meals and at bedtime  metoprolol tartrate 12.5 milliGRAM(s) Oral every 12 hours  predniSONE   Tablet 100 milliGRAM(s) Oral daily    MEDICATIONS  (PRN):  dextrose Oral Gel 15 Gram(s) Oral once PRN Blood Glucose LESS THAN 70 milliGRAM(s)/deciliter  melatonin 3 milliGRAM(s) Oral at bedtime PRN Insomnia          PHYSICAL EXAM:  Vital Signs Last 24 Hrs  T(C): 36.4 (30 Aug 2022 04:28), Max: 36.7 (29 Aug 2022 13:35)  T(F): 97.5 (30 Aug 2022 04:28), Max: 98.1 (29 Aug 2022 13:35)  HR: 84 (30 Aug 2022 04:28) (82 - 95)  BP: 112/65 (30 Aug 2022 04:28) (111/67 - 120/77)  BP(mean): --  RR: 18 (30 Aug 2022 04:28) (18 - 18)  SpO2: 97% (30 Aug 2022 04:28) (96% - 99%)    Parameters below as of 30 Aug 2022 04:28  Patient On (Oxygen Delivery Method): room air        I&O's Summary    29 Aug 2022 07:01  -  30 Aug 2022 07:00  --------------------------------------------------------  IN: 480 mL / OUT: 150 mL / NET: 330 mL        General: NAD, non-toxic appearing   HEENT: PERRLA, EOMi, no scleral icterus  CV: RRR, normal S1 and S2, no m/r/g. On Tele.   Lungs: normal respiratory effort. CTAB, no wheezes, rales, or rhonchi  Abd: soft, nontender, nondistended  Ext: 2+ pitting edema, 2+ peripheral pulses.   Pysch: AAOx1, appropriate affect   Neuro: grossly non-focal, moving all extremities spontaneously   Skin: no rashes or lesions     LABS:  CAPILLARY BLOOD GLUCOSE      POCT Blood Glucose.: 232 mg/dL (29 Aug 2022 21:52)  POCT Blood Glucose.: 218 mg/dL (29 Aug 2022 16:43)  POCT Blood Glucose.: 121 mg/dL (29 Aug 2022 11:54)  POCT Blood Glucose.: 111 mg/dL (29 Aug 2022 07:43)                              9.2    10.36 )-----------( 259      ( 29 Aug 2022 12:40 )             29.4       WBC Trend: 10.36<--, 8.55<--, 8.29<--  Hb Trend: 9.2<--, 8.4<--, 8.4<--, 7.5<--, 7.3<--    08-29    138  |  98  |  93<H>  ----------------------------<  115<H>  3.7   |  27  |  1.75<H>    Ca    9.6      29 Aug 2022 12:40  Phos  3.0     08-29  Mg     2.0     08-29    TPro  7.4  /  Alb  3.6  /  TBili  2.5<H>  /  DBili  x   /  AST  20  /  ALT  13  /  AlkPhos  121<H>  08-28                  RADIOLOGY & ADDITIONAL TESTS: Reviewed

## 2022-08-30 NOTE — PROGRESS NOTE ADULT - PROBLEM SELECTOR PLAN 3
s/p rasburicase  - uric acid 13-->6, potassium stable   - now on allopurinol 100mg (increase to 200mg if uric acid levels rise as per Nephro) s/p rasburicase  - uric acid 13-->6, potassium stable   - now on allopurinol 300mg as per nephro History of HFrEF EF 45%, diffuse LV hypokinesis, mod pulmHTN; followed by cardiology outpatient, elevated proBNP with uptrending troponins, now downtrending. TTE (8/27) ef=20%, severe MR, mild LV enlargement, normal RA, RV enlargement with decreased RVSF, mild-mod tricuspid regurg, moderate pulm pressures, b/l pleural effusions   - c/w metoprolol tartrate 12.5mg BID (for discharge consider metoprolol succinate vs. resuming home Carvedilol at lower dose as per Cards)  - c/w hYDRALAZINE 10MG q8h for afterload reduction  - consider adding entresto (in place of home lisinopril) and SGLT2-inhibitor if renal function tolerates

## 2022-08-30 NOTE — PROGRESS NOTE ADULT - PROBLEM SELECTOR PLAN 10
History of Aflutter with CHB s/p Micra pacemaker then s/p MDT CRT-P upgrade 9/30/19  - EP consulted for ICD interrogation 8/29/22 - wnl

## 2022-08-30 NOTE — PROGRESS NOTE ADULT - PROBLEM SELECTOR PLAN 2
- Trop T 225-->476 -->495-->379, suspected demand ischemia in setting of non-obstructive CAD  - EKG with prolonged QTc 500s from 600s   - trop now downtrending - Trop T 225-->476 -->495-->379, suspected demand ischemia in setting of non-obstructive CAD  - EKG with prolonged QTc 500s from 600s   - trop now downtrending, no further workup required - h/o grade 3 follicular lymphoma, pt was pending surgical biopsy on 8/23, however in heme/onc outpt labs s/f TLS with uric acid 13.7, pt was started rasburicase on 8/23 and developed rasburicase-triggered G6PD hemolysis with hgb 5.7 on 8/26  - UA 6.4 from 13.7 s/p rasburicase on 8/23  - trend hemolysis labs   - per heme recommendations 8/26:       - continue with allopurinol daily   - follow-up with heme on 8/29:      - Started prednisone 100mg daily x5 days with intent to start Obinutuzumab-COEP inpatient 8/30      - double-lumen PICC placed, now on full dose heparin gtt  - Surgery consulted for biopsy for definitive diagnosis      - will need medical clearance, not medically cleared by HF  - cardiology consult      - as per HF team, patient still volume overloaded and not cleared medically for biopsy      - as per HF patient can be on full dose AC with heparin gtt  - cardiac oncology consult

## 2022-08-30 NOTE — HISTORY OF PRESENT ILLNESS
[Disease:__________________________] : Disease: [unfilled] [Treatment Protocol] : Treatment Protocol [de-identified] : 71 yo with MHx significant for Atrial flutter (on Apixaban), HTN, here for further evaluation of low-level neutrophilia (since 1/2022) and lymphadenopathy. Previously NHL (around [id number]?). He received chemotherapy in 2004. 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 In 2010 he went to [place] and was treated for reoccurrence and was treated with bendamustine. He reports that he has had areas of swelling in his neck on and off for about 2 years which was mostly undergoing workup with his endocrinologist. In the last few months he has noticed increasing size of his left cervical LNs and a right nodule that caused swelling of his face, which has now spontaneously decreased in size significantly.\par \par Today he reports he feels well outside of weight loss. He denies any other constitutional complaints. He weighed 192 lbs in 10/2021 which was down to 183 lbs in December 12/2021(2 months later) which has further decreased down to 179 lbs today. He has not felt himself masses outside of his neck region. On 5/14/22, he went for US duplex of his left lower extremity due to a "tangerine size lump" on the back of his left thigh, which noted a 4.4 cm hypoechoic mass in his left inguinal region without commenting on his perceived posterior thigh mass. Also, on 5/2/22 he underwent US of his thyroid and parathyroid glands where they noticed bilateral cervical lymph nodes with the largest on the left side measuring 2.1 x 1.6 x 1.9 cm and a right level 1 LN measuring 2.2 x 1.2 x 2.3 cm. They saw 3 lymph nodes on the left side and one discrete LN on the right.\par \par  He also recently just finished a 14 day course of Levofloxacin for an uncomplicated phenomena. He was having a dry cough and underwent imaging as an outpatient which found mild left and right pleural effusions, areas of consolidation on the right and retrocardiac airspace involvement (performed 5/2/22). He now feels better without infectious complaints.\par \par In addition, he is currently anemic. He last had a colonoscopy in his recent memory. He had bleeding hemorrhoids last year treated with OTC medications. He denies any tick bites recently. \par \par He also has MGUS with 3 separate monoclonal gammopathies I suspect is related to his NHL. He has M Judd 1 showing IgG Lambda at 0.3 g/dl, M Judd 2 showing IgG Kappa at 0.2 g/dl, M Judd 3 showing IgM Lambda (unable to quantitate). There is no suspicion for myeloma or waldenstroms at this time and his M-spikes will be monitored. He has no elevation in kappa/ lambda FLC ration, but individual light chains are both elevated, kappa at 10.77 mg/dL and lambda at 6.58 mg/dL.\par \par Social Hx\par - He is currently retired. He used to be an .\par - No significant environmental exposure to chemicals\par - Lives by himself and his wife lives in [place].\par - Former smoker. He smoked for 10 years less than 1 pack a day. He quit 20 years ago\par \par Family hx\par - Two brothers non Hodgkins lymphoma. At least one required chemotherapy. sister had cervical cancer first diagnosed 2007 and then 3 years later with more cancer discovered\par - Mother and father passed away from heart disease and diabetes.\par \par =======================================================\par Care Providers:\[facility] \par PMD: Dr Amari Hickman\[facility] Endo: Dr Carter Vigil\[facility] Cardiologist: Dr. Don [phone] fax [phone]\par \par =======================================================\par \par Vonnie (daughter): [phone] - takes all healthcare related calls [de-identified] : PENDING [de-identified] : Fine Needle Aspiration Report - Auth (Verified)\par Specimen(s) Submitted\par 1. AXILLA, LEFT, US GUIDED CORE BIOPSY AND FNA\par 2. LYMPH NODE, CERVICAL, LEFT POSTERIOR, US GUIDED CORE BIOPSY AND FNA\par \par \par Final Diagnosis\par 1. AXILLA, LEFT, US GUIDED CORE BIOPSY AND FNA\par POSITIVE FOR MALIGNANT CELLS.\par B-cell lymphoma of follicular center origin, most consistent with\par follicular lymphoma, grade 3A.  See note.\par \par Fine Needle Aspiration Addendum Report - Auth (Verified)\par \par Addendum\par 2. LYMPH NODE, CERVICAL, LEFT POSTERIOR, US GUIDED CORE BIOPSY AND FNA\par POSITIVE FOR MALIGNANT CELLS.\par B-cell lymphoma of follicular center origin with high proliferation index.\par See note.\par \par Note:\par The neoplastic B cells demonstrate a follicular center B-cell phenotype with MUM-1 co-expression and a high proliferation index.  Follicular dendritic meshwork is present in most areas of the biopsy, consistent\par with follicular lymphoma.  However, there is one focal area with increased larger cells and lack of follicular dendritic meshwork. Therefore, a high grade component can not be excluded.  If clinically indicated, suggest excisional biopsy for definitive classification.\par \par Immunohistochemical stains   (CD3, CD5, CD10, CD20, CD21, CD23, CD30, BCL-6, BCL-2, cyclinD1, ki-67, c-MYC, PAX-5, MUM-1, p53, ISAURO) were performed on block 2C.  The neoplastic cells are positive for CD20, PAX-5, BCL-6, BCL-2, MUM-1 (partial), dim p53; negative CD5, CD10, CD30, cyclinD1, ISAURO.  CD21 and CD23 highlight follicular dendritic meshwork in most areas with focal absence of follicular dendritic meshwork. \par Ki-67 proliferation index is approximately 60 to 70%.  C-MYC stains approximately 20 to 30% of cells.  CD3 and CD5 highlight some T-cells in the background. [de-identified] : 6/14/22 FNA of Left axilla LN: FLUORESCENCE IN-SITU HYBRIDIZATION (FISH)\par 1. No evidence of MYC Rearrangement\par 2. No evidence of BCL2-IGH [translocation t(14;18)] gene rearrangement\par 3. 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 [de-identified] : 5/18/22: Initial Visit.\par \par 6/20/22: Follow-up. Patient feels well overall. Lymphoma labs and left axilla LN biopsy revealed grade 3A follicular lymphoma, IgG Lambda and Kappa MGUS. He reports lymph nodes have been stable. Heart function is stable. He denies having any recent fevers, chills, nausea. No weight changes.\par \par 8/22/22: Follow-up. Patient feels well overall. Awaiting for biopsy results of lymph nodes in his back. He does not have pain in the left back. The left side of his neck gives him discomfort. He has never received chemotherapy nor antibody treatment in the past. No fevers, chills, night sweats. No new noticeable masses  Good appetite, lost 7 lbs (lost a lot of fluid weight due to diuretics). \par \par ___\par A comprehensive review of systems was performed including constitutional, eyes, ENT, cardiovascular, respiratory, gastrointestinal, genitourinary, musculoskeletal, integumentary, neurological, psychiatric and hematologic / lymphatic. All pertinent positives are included in the H&P under interval history above and the remaining review of systems listed are negative.  [FreeTextEntry1] : Obinutuzumab modified mini-COEP: 400 mg/m² cyclophosphamide, Etoposide (40% dose reduced to 30 mg/m2 IV on day 1 and 60 mg/m2 PO on days 2 and 3 of each cycle), and 2 mg vincristine (flat dose) on day 1 of each cycle, and 100 mg prednisone on days 1–5. Modified from Man et al 2009 Blood "R-CHOP with Etoposide Substituted for Doxorubicin (R-CEOP): Excellent Outcome in Diffuse Large B Cell Lymphoma for Patients with a Contraindication to Anthracyclines." with mini- dosing for elderly patients.

## 2022-08-30 NOTE — PROGRESS NOTE ADULT - SUBJECTIVE AND OBJECTIVE BOX
Interval History:  NAEON     Medications:  allopurinol 300 milliGRAM(s) Oral daily  atorvastatin 40 milliGRAM(s) Oral at bedtime  buMETAnide Injectable 2 milliGRAM(s) IV Push two times a day  chlorhexidine 2% Cloths 1 Application(s) Topical daily  dextrose 5%. 1000 milliLiter(s) IV Continuous <Continuous>  dextrose 5%. 1000 milliLiter(s) IV Continuous <Continuous>  dextrose 50% Injectable 25 Gram(s) IV Push once  dextrose 50% Injectable 12.5 Gram(s) IV Push once  dextrose 50% Injectable 25 Gram(s) IV Push once  dextrose Oral Gel 15 Gram(s) Oral once PRN  glucagon  Injectable 1 milliGRAM(s) IntraMuscular once  hydrALAZINE 10 milliGRAM(s) Oral every 8 hours  insulin lispro (ADMELOG) corrective regimen sliding scale   SubCutaneous Before meals and at bedtime  melatonin 3 milliGRAM(s) Oral at bedtime PRN  metoprolol tartrate 12.5 milliGRAM(s) Oral every 12 hours  multivitamin 1 Tablet(s) Oral daily  predniSONE   Tablet 100 milliGRAM(s) Oral daily      Vitals:  T(C): 36.4 (22 @ 11:35), Max: 36.7 (22 @ 13:35)  HR: 77 (22 @ 11:35) (77 - 95)  BP: 113/70 (22 @ 11:35) (111/68 - 119/71)  BP(mean): --  RR: 18 (22 @ 11:35) (18 - 18)  SpO2: 99% (22 @ 11:35) (96% - 99%)    Daily     Daily Weight in k.5 (30 Aug 2022 08:45)        I&O's Summary    29 Aug 2022 07:01  -  30 Aug 2022 07:00  --------------------------------------------------------  IN: 480 mL / OUT: 150 mL / NET: 330 mL        Physical Exam:  Appearance: No Acute Distress  HEENT: PERRL  Neck: elevated JVD   Cardiovascular: RRR  Respiratory: crackles +   Gastrointestinal: Soft, Non-tender	  Skin: No cyanosis	  Neurologic: Non-focal  Extremities: +edema   Psychiatry: A & O x 3, Mood & affect appropriate    Labs:                        8.6    12.06 )-----------( 270      ( 30 Aug 2022 11:15 )             28.3         142  |  100  |  98<H>  ----------------------------<  202<H>  4.5   |  24  |  1.78<H>    Ca    9.5      30 Aug 2022 11:15  Phos  4.1       Mg     2.1         TPro  8.0  /  Alb  4.0  /  TBili  1.4<H>  /  DBili  x   /  AST  23  /  ALT  15  /  AlkPhos  168<H>              TELEMETRY:  V paced with underlying intermittent Sinus, intermittent PVCs

## 2022-08-30 NOTE — PROGRESS NOTE ADULT - PROBLEM SELECTOR PLAN 5
History of HFrEF EF 45%, diffuse LV hypokinesis, mod pulmHTN; followed by cardiology outpatient, elevated proBNP with uptrending troponins, now downtrending. TTE (8/27) ef=20%, severe MR, mild LV enlargement, normal RA, RV enlargement with decreased RVSF, mild-mod tricuspid regurg, moderate pulm pressures, b/l pleural effusions   - c/w metoprolol tartrate 12.5mg BID (for discharge consider metoprolol succinate vs. resuming home Carvedilol at lower dose as per Cards)  - c/w hYDRALAZINE 10MG q8h for afterload reduction  - consider adding entresto (in place of home lisinopril) and SGLT2-inhibitor if renal function tolerates - Trop T 225-->476 -->495-->379, suspected demand ischemia in setting of non-obstructive CAD  - EKG with prolonged QTc 500s from 600s   - trop now downtrending, no further workup required

## 2022-08-31 DIAGNOSIS — R41.0 DISORIENTATION, UNSPECIFIED: ICD-10-CM

## 2022-08-31 LAB
ALBUMIN SERPL ELPH-MCNC: 4 G/DL — SIGNIFICANT CHANGE UP (ref 3.3–5)
ALP SERPL-CCNC: 164 U/L — HIGH (ref 40–120)
ALT FLD-CCNC: 14 U/L — SIGNIFICANT CHANGE UP (ref 10–45)
ANION GAP SERPL CALC-SCNC: 16 MMOL/L — SIGNIFICANT CHANGE UP (ref 5–17)
APTT BLD: 35.3 SEC — SIGNIFICANT CHANGE UP (ref 27.5–35.5)
AST SERPL-CCNC: 14 U/L — SIGNIFICANT CHANGE UP (ref 10–40)
BILIRUB SERPL-MCNC: 1.2 MG/DL — SIGNIFICANT CHANGE UP (ref 0.2–1.2)
BUN SERPL-MCNC: 99 MG/DL — HIGH (ref 7–23)
CALCIUM SERPL-MCNC: 9.9 MG/DL — SIGNIFICANT CHANGE UP (ref 8.4–10.5)
CHLORIDE SERPL-SCNC: 100 MMOL/L — SIGNIFICANT CHANGE UP (ref 96–108)
CO2 SERPL-SCNC: 24 MMOL/L — SIGNIFICANT CHANGE UP (ref 22–31)
CREAT SERPL-MCNC: 2.04 MG/DL — HIGH (ref 0.5–1.3)
CULTURE RESULTS: SIGNIFICANT CHANGE UP
CULTURE RESULTS: SIGNIFICANT CHANGE UP
EGFR: 34 ML/MIN/1.73M2 — LOW
GLUCOSE BLDC GLUCOMTR-MCNC: 155 MG/DL — HIGH (ref 70–99)
GLUCOSE BLDC GLUCOMTR-MCNC: 213 MG/DL — HIGH (ref 70–99)
GLUCOSE BLDC GLUCOMTR-MCNC: 221 MG/DL — HIGH (ref 70–99)
GLUCOSE BLDC GLUCOMTR-MCNC: 254 MG/DL — HIGH (ref 70–99)
GLUCOSE SERPL-MCNC: 135 MG/DL — HIGH (ref 70–99)
HCT VFR BLD CALC: 28.4 % — LOW (ref 39–50)
HGB BLD-MCNC: 8.7 G/DL — LOW (ref 13–17)
LDH SERPL L TO P-CCNC: 387 U/L — HIGH (ref 50–242)
LDH SERPL L TO P-CCNC: 475 U/L — HIGH (ref 50–242)
MAGNESIUM SERPL-MCNC: 2.1 MG/DL — SIGNIFICANT CHANGE UP (ref 1.6–2.6)
MCHC RBC-ENTMCNC: 26.1 PG — LOW (ref 27–34)
MCHC RBC-ENTMCNC: 30.6 GM/DL — LOW (ref 32–36)
MCV RBC AUTO: 85.3 FL — SIGNIFICANT CHANGE UP (ref 80–100)
NRBC # BLD: 0 /100 WBCS — SIGNIFICANT CHANGE UP (ref 0–0)
PHOSPHATE SERPL-MCNC: 4.7 MG/DL — HIGH (ref 2.5–4.5)
PLATELET # BLD AUTO: 300 K/UL — SIGNIFICANT CHANGE UP (ref 150–400)
POTASSIUM SERPL-MCNC: 3.5 MMOL/L — SIGNIFICANT CHANGE UP (ref 3.5–5.3)
POTASSIUM SERPL-SCNC: 3.5 MMOL/L — SIGNIFICANT CHANGE UP (ref 3.5–5.3)
PROT SERPL-MCNC: 7.9 G/DL — SIGNIFICANT CHANGE UP (ref 6–8.3)
RBC # BLD: 3.33 M/UL — LOW (ref 4.2–5.8)
RBC # FLD: 19.1 % — HIGH (ref 10.3–14.5)
SARS-COV-2 RNA SPEC QL NAA+PROBE: SIGNIFICANT CHANGE UP
SODIUM SERPL-SCNC: 140 MMOL/L — SIGNIFICANT CHANGE UP (ref 135–145)
SPECIMEN SOURCE: SIGNIFICANT CHANGE UP
SPECIMEN SOURCE: SIGNIFICANT CHANGE UP
URATE SERPL-MCNC: 7.3 MG/DL — SIGNIFICANT CHANGE UP (ref 3.4–8.8)
WBC # BLD: 15.53 K/UL — HIGH (ref 3.8–10.5)
WBC # FLD AUTO: 15.53 K/UL — HIGH (ref 3.8–10.5)

## 2022-08-31 PROCEDURE — 99233 SBSQ HOSP IP/OBS HIGH 50: CPT

## 2022-08-31 PROCEDURE — 99233 SBSQ HOSP IP/OBS HIGH 50: CPT | Mod: GC

## 2022-08-31 PROCEDURE — 99232 SBSQ HOSP IP/OBS MODERATE 35: CPT | Mod: GC

## 2022-08-31 PROCEDURE — 71045 X-RAY EXAM CHEST 1 VIEW: CPT | Mod: 26

## 2022-08-31 PROCEDURE — 70450 CT HEAD/BRAIN W/O DYE: CPT | Mod: 26

## 2022-08-31 RX ORDER — HEPARIN SODIUM 5000 [USP'U]/ML
3000 INJECTION INTRAVENOUS; SUBCUTANEOUS EVERY 6 HOURS
Refills: 0 | Status: DISCONTINUED | OUTPATIENT
Start: 2022-08-31 | End: 2022-09-03

## 2022-08-31 RX ORDER — HEPARIN SODIUM 5000 [USP'U]/ML
6500 INJECTION INTRAVENOUS; SUBCUTANEOUS EVERY 6 HOURS
Refills: 0 | Status: DISCONTINUED | OUTPATIENT
Start: 2022-08-31 | End: 2022-09-03

## 2022-08-31 RX ORDER — ALLOPURINOL 300 MG
100 TABLET ORAL DAILY
Refills: 0 | Status: DISCONTINUED | OUTPATIENT
Start: 2022-08-31 | End: 2022-09-07

## 2022-08-31 RX ORDER — ACYCLOVIR SODIUM 500 MG
400 VIAL (EA) INTRAVENOUS
Refills: 0 | Status: DISCONTINUED | OUTPATIENT
Start: 2022-08-31 | End: 2022-09-07

## 2022-08-31 RX ORDER — HEPARIN SODIUM 5000 [USP'U]/ML
6500 INJECTION INTRAVENOUS; SUBCUTANEOUS ONCE
Refills: 0 | Status: COMPLETED | OUTPATIENT
Start: 2022-08-31 | End: 2022-08-31

## 2022-08-31 RX ORDER — HEPARIN SODIUM 5000 [USP'U]/ML
INJECTION INTRAVENOUS; SUBCUTANEOUS
Qty: 25000 | Refills: 0 | Status: DISCONTINUED | OUTPATIENT
Start: 2022-08-31 | End: 2022-09-03

## 2022-08-31 RX ADMIN — Medication 1 TABLET(S): at 12:08

## 2022-08-31 RX ADMIN — Medication 10 MILLIGRAM(S): at 13:59

## 2022-08-31 RX ADMIN — Medication 12.5 MILLIGRAM(S): at 05:55

## 2022-08-31 RX ADMIN — HEPARIN SODIUM 1400 UNIT(S)/HR: 5000 INJECTION INTRAVENOUS; SUBCUTANEOUS at 07:54

## 2022-08-31 RX ADMIN — Medication 2: at 12:07

## 2022-08-31 RX ADMIN — BUMETANIDE 2 MILLIGRAM(S): 0.25 INJECTION INTRAMUSCULAR; INTRAVENOUS at 05:59

## 2022-08-31 RX ADMIN — Medication 10 MILLIGRAM(S): at 22:09

## 2022-08-31 RX ADMIN — Medication 2: at 22:07

## 2022-08-31 RX ADMIN — ATORVASTATIN CALCIUM 40 MILLIGRAM(S): 80 TABLET, FILM COATED ORAL at 22:09

## 2022-08-31 RX ADMIN — BUMETANIDE 2 MILLIGRAM(S): 0.25 INJECTION INTRAMUSCULAR; INTRAVENOUS at 14:01

## 2022-08-31 RX ADMIN — CHLORHEXIDINE GLUCONATE 1 APPLICATION(S): 213 SOLUTION TOPICAL at 12:13

## 2022-08-31 RX ADMIN — Medication 100 MILLIGRAM(S): at 05:55

## 2022-08-31 RX ADMIN — HEPARIN SODIUM 1400 UNIT(S)/HR: 5000 INJECTION INTRAVENOUS; SUBCUTANEOUS at 19:40

## 2022-08-31 RX ADMIN — Medication 400 MILLIGRAM(S): at 17:06

## 2022-08-31 RX ADMIN — Medication 3 MILLIGRAM(S): at 22:17

## 2022-08-31 RX ADMIN — CHLORHEXIDINE GLUCONATE 1 APPLICATION(S): 213 SOLUTION TOPICAL at 07:49

## 2022-08-31 RX ADMIN — HEPARIN SODIUM 1400 UNIT(S)/HR: 5000 INJECTION INTRAVENOUS; SUBCUTANEOUS at 00:00

## 2022-08-31 RX ADMIN — Medication 10 MILLIGRAM(S): at 05:55

## 2022-08-31 RX ADMIN — Medication 1: at 08:18

## 2022-08-31 RX ADMIN — HEPARIN SODIUM 1400 UNIT(S)/HR: 5000 INJECTION INTRAVENOUS; SUBCUTANEOUS at 17:29

## 2022-08-31 RX ADMIN — Medication 3: at 16:26

## 2022-08-31 RX ADMIN — Medication 100 MILLIGRAM(S): at 12:10

## 2022-08-31 RX ADMIN — Medication 12.5 MILLIGRAM(S): at 17:06

## 2022-08-31 RX ADMIN — HEPARIN SODIUM 6500 UNIT(S): 5000 INJECTION INTRAVENOUS; SUBCUTANEOUS at 17:28

## 2022-08-31 NOTE — PROGRESS NOTE ADULT - PROBLEM SELECTOR PLAN 4
Patient acutely delirious this AM possible ddx: uremia vs. lymphoma related vs. steroid related vs. primary CNS process vs. other toxic metabolic   -Info related to primary team possible CT head, recommend infectious work up and ammonia level

## 2022-08-31 NOTE — PROGRESS NOTE ADULT - SUBJECTIVE AND OBJECTIVE BOX
[facility]   [phone]  RENAL FOLLOW UP NOTE  --------------------------------------------------------------------------------  Chief Complaint:    24 hour events/subjective:    seen earlier   states breathing is better   daughter at bedside     PAST HISTORY  --------------------------------------------------------------------------------  No significant changes to PMH, PSH, FHx, SHx, unless otherwise noted    ALLERGIES & MEDICATIONS  --------------------------------------------------------------------------------  Allergies    No Known Allergies    Intolerances      Standing Inpatient Medications  acyclovir   Oral Tab/Cap 400 milliGRAM(s) Oral two times a day  allopurinol 100 milliGRAM(s) Oral daily  atorvastatin 40 milliGRAM(s) Oral at bedtime  buMETAnide Injectable 2 milliGRAM(s) IV Push two times a day  chlorhexidine 2% Cloths 1 Application(s) Topical daily  chlorhexidine 4% Liquid 1 Application(s) Topical <User Schedule>  dextrose 5%. 1000 milliLiter(s) IV Continuous <Continuous>  dextrose 5%. 1000 milliLiter(s) IV Continuous <Continuous>  dextrose 50% Injectable 25 Gram(s) IV Push once  dextrose 50% Injectable 12.5 Gram(s) IV Push once  dextrose 50% Injectable 25 Gram(s) IV Push once  glucagon  Injectable 1 milliGRAM(s) IntraMuscular once  heparin  Infusion.  Unit(s)/Hr IV Continuous <Continuous>  hydrALAZINE 10 milliGRAM(s) Oral every 8 hours  insulin lispro (ADMELOG) corrective regimen sliding scale   SubCutaneous Before meals and at bedtime  metoprolol tartrate 12.5 milliGRAM(s) Oral every 12 hours  multivitamin 1 Tablet(s) Oral daily  predniSONE   Tablet 100 milliGRAM(s) Oral daily    PRN Inpatient Medications  dextrose Oral Gel 15 Gram(s) Oral once PRN  heparin   Injectable 6500 Unit(s) IV Push every 6 hours PRN  heparin   Injectable 3000 Unit(s) IV Push every 6 hours PRN  melatonin 3 milliGRAM(s) Oral at bedtime PRN  sodium chloride 0.9% lock flush 10 milliLiter(s) IV Push every 1 hour PRN      REVIEW OF SYSTEMS  --------------------------------------------------------------------------------    Gen: denies  fevers/chills,  CVS: denies chest pain/palpitations  Resp: denies SOB/Cough  GI: Denies N/V/Abd pain  : Denies dysuria    VITALS/PHYSICAL EXAM  --------------------------------------------------------------------------------  T(C): 36.6 (08-31-22 @ 20:01), Max: 36.6 (08-31-22 @ 20:01)  HR: 81 (08-31-22 @ 20:01) (79 - 91)  BP: 115/61 (08-31-22 @ 20:01) (111/66 - 127/70)  RR: 18 (08-31-22 @ 20:01) (18 - 18)  SpO2: 95% (08-31-22 @ 20:01) (93% - 98%)  Wt(kg): --        08-30-22 @ 07:01  -  08-31-22 @ 07:00  --------------------------------------------------------  IN: 734 mL / OUT: 975 mL / NET: -241 mL    08-31-22 @ 07:01  -  08-31-22 @ 22:20  --------------------------------------------------------  IN: 550 mL / OUT: 1000 mL / NET: -450 mL      Physical Exam:  	  Gen: thin pale appearing   	+ JVD  	Pulm: decrease bs  no rales or ronchi or wheezing  	CV: RRR, S1S2; no rub  	Abd: +BS, soft, nontender/nondistended  	: No suprapubic tenderness  	UE: Warm, no cyanosis  no clubbing,  no edema  	LE: Warm, no cyanosis  no clubbing,  + trace   edema  	Neuro: alert and oriented. speech coherent       LABS/STUDIES  --------------------------------------------------------------------------------              8.7    15.53 >-----------<  300      [08-31-22 @ 06:59]              28.4     140  |  100  |  99  ----------------------------<  135      [08-31-22 @ 06:59]  3.5   |  24  |  2.04        Ca     9.9     [08-31-22 @ 06:59]      Mg     2.1     [08-31-22 @ 06:59]      Phos  4.7     [08-31-22 @ 06:59]    TPro  7.9  /  Alb  4.0  /  TBili  1.2  /  DBili  x   /  AST  14  /  ALT  14  /  AlkPhos  164  [08-31-22 @ 06:59]      PTT: 35.3       [08-31-22 @ 11:26]    Uric acid 7.3      [08-31-22 @ 06:59]        [08-31-22 @ 11:26]    Creatinine Trend:  SCr 2.04 [08-31 @ 06:59]  SCr 1.78 [08-30 @ 11:15]  SCr 1.75 [08-29 @ 12:40]  SCr 1.79 [08-29 @ 05:52]  SCr 1.70 [08-28 @ 10:32]                  HbA1c 6.0      [09-26-19 @ 08:44]

## 2022-08-31 NOTE — PROGRESS NOTE ADULT - PROBLEM SELECTOR PLAN 2
- h/o grade 3 follicular lymphoma, pt was pending surgical biopsy on 8/23, however in heme/onc outpt labs s/f TLS with uric acid 13.7, pt was started rasburicase on 8/23 and developed rasburicase-triggered G6PD hemolysis with hgb 5.7 on 8/26  - UA 6.4 from 13.7 s/p rasburicase on 8/23  - trend hemolysis labs   - per heme recommendations 8/26:       - continue with allopurinol daily   - follow-up with heme on 8/29:      - Started prednisone 100mg daily x5 days with intent to start Obinutuzumab-COEP inpatient 8/30      - double-lumen PICC placed, now on full dose heparin gtt  - Surgery consulted for biopsy for definitive diagnosis      - will need medical clearance, not medically cleared by HF  - cardiology consult      - as per HF team, patient still volume overloaded and not cleared medically for biopsy      - as per HF patient can be on full dose AC with heparin gtt  - cardiac oncology consult - h/o grade 3 follicular lymphoma, pt was pending surgical biopsy on 8/23, however in heme/onc outpt labs s/f TLS with uric acid 13.7, pt was started rasburicase on 8/23 and developed rasburicase-triggered G6PD hemolysis with hgb 5.7 on 8/26  - UA 6.4 from 13.7 s/p rasburicase on 8/23  - trend hemolysis labs   - per heme recommendations 8/26:       - continue with allopurinol daily  - follow-up with heme on 8/29:      - Started prednisone 100mg daily x5 days with intent to start Obinutuzumab-COEP inpatient 9/1      - PICC placed (8/30) however patient removed it      - PICC placed again (8/31), can consider observation to make sure he does not remove it again  - Surgery consulted for biopsy for definitive diagnosis      - can consider biopsy under local anesthesia, ongoing discussion regarding location of interest  - cardiology consult      - as per HF team, patient still volume overloaded and not cleared medically for biopsy under general anesthesia      - c/w current management with diuresis, metoprolol, hydralazine      - pending bladder scan to r/o obstruction  - cardiology oncology consult      - awaiting recs   - cardiac oncology consult

## 2022-08-31 NOTE — PROGRESS NOTE ADULT - ATTENDING COMMENTS
73yo M w/ extensive PMHx including DLBCL, G6PD deficiency, complete heart block s/p PPM, HFrEF (new EF 20%), CKD 3b, paroxysmal atrial fibrillation, presents with anemia, pt was treated for TLS syndrome w/ rasburicase on 8/23 and current episode concerning for an acute hemolytic anemia in setting of G6PD deficiency. Patient also p/w acute hypoxic respiratory failure due to heart failure exacerbation requiring bipap initially. He was eventually titrate off bipap and nasal cannula, now breathing comfortably on room air after initiation of diuretics. Patient is planned for eventual surgical biopsy but not yet medically optimized.    #Hemolytic anemia  -s/p transfusion, counts stable, continue to trend CBC  -G6PD level pending  -c/w A/C (eliquis switched to hep gtt in anticipation of bx)  -heme following    #Follicular lymphoma  #TLS  -consulted renal for TLS  -started on prednisone per heme - plan to do inpatient treatment soon  -requested picc line (patient pulled it out last night, needs a new one)  -cont allopurinol     #acute on chronic systolic heart failure  -heart failure recs appreciated, consulting cardio-oncology now too  -Low dose afterload reduction w/ hydral 10 TID PO  -switched coreg to metoprolol due to soft BPs, will add entresto per HF recs  -cont bumex 2mg BID  -may need ischemic workup prior to dc, f/u with cards  -echo shows new EF of 20% (previously 45%)    Metabolic encephalopathy  - patient has been oriented to self and sometimes place for the past few days, but no acute changes in mentation  - check CT head  - rising wbc count is likely due to steroid initiation

## 2022-08-31 NOTE — PROGRESS NOTE ADULT - PROBLEM SELECTOR PLAN 4
presenting with G6PD hemolysis 2/2 rasburicase (low hapto, high LDH/alk/bili) s/p rasburicase for TLS on 8/23 w/hgb 5 on 8/26 (hgb 9 on 8/23)  - pt s/p total 4 units pRBC  - trend hgb q12h for now, transfuse to maintain hgb>7

## 2022-08-31 NOTE — PROGRESS NOTE ADULT - SUBJECTIVE AND OBJECTIVE BOX
Interval History:  NAEON    Medications:  allopurinol 300 milliGRAM(s) Oral daily  atorvastatin 40 milliGRAM(s) Oral at bedtime  buMETAnide Injectable 2 milliGRAM(s) IV Push two times a day  chlorhexidine 2% Cloths 1 Application(s) Topical daily  chlorhexidine 4% Liquid 1 Application(s) Topical <User Schedule>  dextrose 5%. 1000 milliLiter(s) IV Continuous <Continuous>  dextrose 5%. 1000 milliLiter(s) IV Continuous <Continuous>  dextrose 50% Injectable 25 Gram(s) IV Push once  dextrose 50% Injectable 12.5 Gram(s) IV Push once  dextrose 50% Injectable 25 Gram(s) IV Push once  dextrose Oral Gel 15 Gram(s) Oral once PRN  glucagon  Injectable 1 milliGRAM(s) IntraMuscular once  heparin   Injectable 6500 Unit(s) IV Push every 6 hours PRN  heparin   Injectable 3000 Unit(s) IV Push every 6 hours PRN  heparin  Infusion.  Unit(s)/Hr IV Continuous <Continuous>  hydrALAZINE 10 milliGRAM(s) Oral every 8 hours  insulin lispro (ADMELOG) corrective regimen sliding scale   SubCutaneous Before meals and at bedtime  melatonin 3 milliGRAM(s) Oral at bedtime PRN  metoprolol tartrate 12.5 milliGRAM(s) Oral every 12 hours  multivitamin 1 Tablet(s) Oral daily  predniSONE   Tablet 100 milliGRAM(s) Oral daily  sodium chloride 0.9% lock flush 10 milliLiter(s) IV Push every 1 hour PRN      Vitals:  T(C): 36.3 (22 @ 05:49), Max: 36.7 (22 @ 13:45)  HR: 84 (22 @ 05:49) (77 - 88)  BP: 111/66 (22 @ 05:49) (107/70 - 115/72)  BP(mean): --  RR: 18 (22 @ 05:49) (18 - 18)  SpO2: 93% (22 @ 05:49) (93% - 99%)    Daily     Daily Weight in k.5 (30 Aug 2022 08:45)        I&O's Summary    30 Aug 2022 07:01  -  31 Aug 2022 07:00  --------------------------------------------------------  IN: 720 mL / OUT: 975 mL / NET: -255 mL        Physical Exam:  Appearance: No Acute Distress  HEENT: PERRL  Neck: elevated JVD   Cardiovascular: RRR  Respiratory: crackles +   Gastrointestinal: Soft, Non-tender	  Skin: No cyanosis	  Neurologic: Non-focal  Extremities: +edema   Psychiatry: A & O x 3, Mood & affect appropriate    Labs:                        8.7    15.53 )-----------( 300      ( 31 Aug 2022 06:59 )             28.4     08-30    142  |  100  |  98<H>  ----------------------------<  202<H>  4.5   |  24  |  1.78<H>    Ca    9.5      30 Aug 2022 11:15  Phos  4.1     08-30  Mg     2.1     08-30    TPro  8.0  /  Alb  4.0  /  TBili  1.4<H>  /  DBili  x   /  AST  23  /  ALT  15  /  AlkPhos  168<H>  08-30    PTT - ( 30 Aug 2022 19:22 )  PTT:26.9 sec        TELEMETRY:    Echocardiogram:   Interval History:  Patient acutely altered this AM, unable to do exam due to agitation. Believes he is getting a dental procedure done, unaware he is at [facility], and unaware of date. O/n patient delirious and removed PICC      Medications:  allopurinol 300 milliGRAM(s) Oral daily  atorvastatin 40 milliGRAM(s) Oral at bedtime  buMETAnide Injectable 2 milliGRAM(s) IV Push two times a day  chlorhexidine 2% Cloths 1 Application(s) Topical daily  chlorhexidine 4% Liquid 1 Application(s) Topical <User Schedule>  dextrose 5%. 1000 milliLiter(s) IV Continuous <Continuous>  dextrose 5%. 1000 milliLiter(s) IV Continuous <Continuous>  dextrose 50% Injectable 25 Gram(s) IV Push once  dextrose 50% Injectable 12.5 Gram(s) IV Push once  dextrose 50% Injectable 25 Gram(s) IV Push once  dextrose Oral Gel 15 Gram(s) Oral once PRN  glucagon  Injectable 1 milliGRAM(s) IntraMuscular once  heparin   Injectable 6500 Unit(s) IV Push every 6 hours PRN  heparin   Injectable 3000 Unit(s) IV Push every 6 hours PRN  heparin  Infusion.  Unit(s)/Hr IV Continuous <Continuous>  hydrALAZINE 10 milliGRAM(s) Oral every 8 hours  insulin lispro (ADMELOG) corrective regimen sliding scale   SubCutaneous Before meals and at bedtime  melatonin 3 milliGRAM(s) Oral at bedtime PRN  metoprolol tartrate 12.5 milliGRAM(s) Oral every 12 hours  multivitamin 1 Tablet(s) Oral daily  predniSONE   Tablet 100 milliGRAM(s) Oral daily  sodium chloride 0.9% lock flush 10 milliLiter(s) IV Push every 1 hour PRN      Vitals:  T(C): 36.3 (22 @ 05:49), Max: 36.7 (22 @ 13:45)  HR: 84 (22 @ 05:49) (77 - 88)  BP: 111/66 (22 @ 05:49) (107/70 - 115/72)  BP(mean): --  RR: 18 (22 @ 05:49) (18 - 18)  SpO2: 93% (22 @ 05:49) (93% - 99%)    Daily     Daily Weight in k.5 (30 Aug 2022 08:45)        I&O's Summary    30 Aug 2022 07:01  -  31 Aug 2022 07:00  --------------------------------------------------------  IN: 720 mL / OUT: 975 mL / NET: -255 mL    Labs:                        8.7    15.53 )-----------( 300      ( 31 Aug 2022 06:59 )             28.4     08    142  |  100  |  98<H>  ----------------------------<  202<H>  4.5   |  24  |  1.78<H>    Ca    9.5      30 Aug 2022 11:15  Phos  4.1       Mg     2.1         TPro  8.0  /  Alb  4.0  /  TBili  1.4<H>  /  DBili  x   /  AST  23  /  ALT  15  /  AlkPhos  168<H>  0830    PTT - ( 30 Aug 2022 19:22 )  PTT:26.9 sec        TELEMETRY:    Echocardiogram:

## 2022-08-31 NOTE — CONSULT NOTE ADULT - SUBJECTIVE AND OBJECTIVE BOX
CARDIO-ONCOLOGY CONSULTATION NOTE                                                                                                                                                                                       HPI:    PAST MEDICAL & SURGICAL HISTORY:  Non-Hodgkins Lymphoma  In [facility] for 10 years      DM type 2 (diabetes mellitus, type 2)  Never on insulin      Essential hypertension      Rhinitis, allergic      Systolic heart failure      Moderate mitral regurgitation      Pleural effusion      Atrial flutter, unspecified type      Impaired memory      Non-Hodgkin lymphoma  h/o axillary dissection      Cardiac pacemaker          FAMILY HISTORY:  Family history of CHF (congestive heart failure)  Mother    Family history of non-Hodgkin&#x27;s lymphoma (Sibling)        Home Medications:  acetaminophen 325 mg oral tablet: 2 tab(s) orally every 6 hours, As needed, Moderate Pain (4 - 6) (27 Aug 2022 04:29)  carvedilol 25 mg oral tablet: 1 tab(s) orally 2 times a day with food (27 Aug 2022 04:29)  docusate sodium 100 mg oral capsule: 2 cap(s) orally once a day (at bedtime), As Needed (27 Aug 2022 04:29)  ivabradine 5 mg oral tablet: 1 tab(s) orally 2 times a day (with meals) (27 Aug 2022 04:29)  senna oral tablet: 2 tab(s) orally once a day (at bedtime), As Needed (27 Aug 2022 04:29)      Social History:  Social Hx  - He is currently retired. He used to be an .  - No significant environmental exposure to chemicals  - Lives by himself and his wife lives in [place].  - Former smoker. He smoked for 10 years less than 1 pack a day. He quit 20 years ago    Family hx  - Two brothers non Hodgkins lymphoma. At least one required chemotherapy. sister had cervical cancer first diagnosed  and then 3 years later with more cancer discovered  - Mother and father passed away from heart disease and diabetes. (27 Aug 2022 03:15)      Medications:  allopurinol 300 milliGRAM(s) Oral daily  atorvastatin 40 milliGRAM(s) Oral at bedtime  buMETAnide Injectable 2 milliGRAM(s) IV Push two times a day  chlorhexidine 2% Cloths 1 Application(s) Topical daily  chlorhexidine 4% Liquid 1 Application(s) Topical <User Schedule>  dextrose 5%. 1000 milliLiter(s) IV Continuous <Continuous>  dextrose 5%. 1000 milliLiter(s) IV Continuous <Continuous>  dextrose 50% Injectable 25 Gram(s) IV Push once  dextrose 50% Injectable 12.5 Gram(s) IV Push once  dextrose 50% Injectable 25 Gram(s) IV Push once  dextrose Oral Gel 15 Gram(s) Oral once PRN  glucagon  Injectable 1 milliGRAM(s) IntraMuscular once  heparin   Injectable 6500 Unit(s) IV Push every 6 hours PRN  heparin   Injectable 3000 Unit(s) IV Push every 6 hours PRN  heparin  Infusion.  Unit(s)/Hr IV Continuous <Continuous>  hydrALAZINE 10 milliGRAM(s) Oral every 8 hours  insulin lispro (ADMELOG) corrective regimen sliding scale   SubCutaneous Before meals and at bedtime  melatonin 3 milliGRAM(s) Oral at bedtime PRN  metoprolol tartrate 12.5 milliGRAM(s) Oral every 12 hours  multivitamin 1 Tablet(s) Oral daily  predniSONE   Tablet 100 milliGRAM(s) Oral daily  sodium chloride 0.9% lock flush 10 milliLiter(s) IV Push every 1 hour PRN      Review of systems:  10 point review of systems completed and are negative unless noted in HPI    Vitals:  T(C): 36.3 (22 @ 05:49), Max: 36.7 (22 @ 13:45)  HR: 84 (22 @ 05:49) (77 - 88)  BP: 111/66 (22 @ 05:49) (107/70 - 115/72)  BP(mean): --  RR: 18 (22 @ 05:49) (18 - 18)  SpO2: 93% (22 @ 05:49) (93% - 99%)    Daily     Daily Weight in k.5 (31 Aug 2022 08:40)        I&O's Summary    30 Aug 2022 07:01  -  31 Aug 2022 07:00  --------------------------------------------------------  IN: 720 mL / OUT: 975 mL / NET: -255 mL        Physical Exam:  Appearance: No apparent distress  HEENT: moist mucosa, anicteric sclerae.   Neck:   Cardiovascular: regular rate and rhythm. No murmur. No gallop. No friction rub.  Respiratory: Clear to auscultation bilaterally  Gastrointestinal: Soft, Non-tender, benign	  Skin: no clubbing or cyanosis.   Neurologic: No focal deficit  Extremities: warm. No edema.  Psychiatry: A & O x 3, Mood & affect appropriate  Vascular: pulse regular, equal    Labs:                        8.7    15.53 )-----------( 300      ( 31 Aug 2022 06:59 )             28.4         140  |  100  |  99<H>  ----------------------------<  135<H>  3.5   |  24  |  2.04<H>    Ca    9.9      31 Aug 2022 06:59  Phos  4.7       Mg     2.1         TPro  7.9  /  Alb  4.0  /  TBili  1.2  /  DBili  x   /  AST  14  /  ALT  14  /  AlkPhos  164<H>      PTT - ( 30 Aug 2022 19:22 )  PTT:26.9 sec            TELEMETRY:    Echocardiogram:        EK Lead ECG:   Ventricular Rate 82 BPM    Atrial Rate 82 BPM    P-R Interval 178 ms <TRUNCATED> (22 @ 09:32)      CXR/Radiology:   CARDIO-ONCOLOGY CONSULTATION NOTE                                                                                                                                                                                       HPI:      Prior Cancer Treatment:  2003: RCHOP x6, relapse 2009 treated with BR    PAST MEDICAL & SURGICAL HISTORY:  Non-Hodgkin's Lymphoma  DM type 2 (diabetes mellitus, type 2), Never on insulin  Essential hypertension  Rhinitis, allergic  Systolic heart failure  mitral regurgitation  Atrial flutter, unspecified type  Impaired memory  Cardiac pacemaker          FAMILY HISTORY:  Family history of CHF (congestive heart failure)  Mother    Family history of non-Hodgkin's lymphoma (Sibling)    Home Medications:  acetaminophen 325 mg oral tablet: 2 tab(s) orally every 6 hours, As needed, Moderate Pain (4 - 6) (27 Aug 2022 04:29)  carvedilol 25 mg oral tablet: 1 tab(s) orally 2 times a day with food (27 Aug 2022 04:29)  docusate sodium 100 mg oral capsule: 2 cap(s) orally once a day (at bedtime), As Needed (27 Aug 2022 04:29)  ivabradine 5 mg oral tablet: 1 tab(s) orally 2 times a day (with meals) (27 Aug 2022 04:29)  senna oral tablet: 2 tab(s) orally once a day (at bedtime), As Needed (27 Aug 2022 04:29)      Social History:  Social Hx  - He is currently retired. He used to be an .  - No significant environmental exposure to chemicals  - Lives by himself and his wife lives in [place].  - Former smoker. He smoked for 10 years less than 1 pack a day. He quit 20 years ago    Family hx  - Two brothers non Hodgkins lymphoma. At least one required chemotherapy. sister had cervical cancer first diagnosed  and then 3 years later with more cancer discovered  - Mother and father passed away from heart disease and diabetes. (27 Aug 2022 03:15)      Medications:  allopurinol 300 milliGRAM(s) Oral daily  atorvastatin 40 milliGRAM(s) Oral at bedtime  buMETAnide Injectable 2 milliGRAM(s) IV Push two times a day  chlorhexidine 2% Cloths 1 Application(s) Topical daily  chlorhexidine 4% Liquid 1 Application(s) Topical <User Schedule>  dextrose 5%. 1000 milliLiter(s) IV Continuous <Continuous>  dextrose 5%. 1000 milliLiter(s) IV Continuous <Continuous>  dextrose 50% Injectable 25 Gram(s) IV Push once  dextrose 50% Injectable 12.5 Gram(s) IV Push once  dextrose 50% Injectable 25 Gram(s) IV Push once  dextrose Oral Gel 15 Gram(s) Oral once PRN  glucagon  Injectable 1 milliGRAM(s) IntraMuscular once  heparin   Injectable 6500 Unit(s) IV Push every 6 hours PRN  heparin   Injectable 3000 Unit(s) IV Push every 6 hours PRN  heparin  Infusion.  Unit(s)/Hr IV Continuous <Continuous>  hydrALAZINE 10 milliGRAM(s) Oral every 8 hours  insulin lispro (ADMELOG) corrective regimen sliding scale   SubCutaneous Before meals and at bedtime  melatonin 3 milliGRAM(s) Oral at bedtime PRN  metoprolol tartrate 12.5 milliGRAM(s) Oral every 12 hours  multivitamin 1 Tablet(s) Oral daily  predniSONE   Tablet 100 milliGRAM(s) Oral daily  sodium chloride 0.9% lock flush 10 milliLiter(s) IV Push every 1 hour PRN      Review of systems:  10 point review of systems completed and are negative unless noted in HPI    Vitals:  T(C): 36.3 (22 @ 05:49), Max: 36.7 (22 @ 13:45)  HR: 84 (22 @ 05:49) (77 - 88)  BP: 111/66 (22 @ 05:49) (107/70 - 115/72)  BP(mean): --  RR: 18 (22 @ 05:49) (18 - 18)  SpO2: 93% (22 @ 05:49) (93% - 99%)    Daily     Daily Weight in k.5 (31 Aug 2022 08:40)        I&O's Summary    30 Aug 2022 07:01  -  31 Aug 2022 07:00  --------------------------------------------------------  IN: 720 mL / OUT: 975 mL / NET: -255 mL        Physical Exam:  Appearance: No apparent distress  HEENT: moist mucosa, anicteric sclerae.   Neck:   Cardiovascular: regular rate and rhythm. No murmur. No gallop. No friction rub.  Respiratory: Clear to auscultation bilaterally  Gastrointestinal: Soft, Non-tender, benign	  Skin: no clubbing or cyanosis.   Neurologic: No focal deficit  Extremities: warm. No edema.  Psychiatry: A & O x 3, Mood & affect appropriate  Vascular: pulse regular, equal    Labs:                        8.7    15.53 )-----------( 300      ( 31 Aug 2022 06:59 )             28.4         140  |  100  |  99<H>  ----------------------------<  135<H>  3.5   |  24  |  2.04<H>    Ca    9.9      31 Aug 2022 06:59  Phos  4.7       Mg     2.1         TPro  7.9  /  Alb  4.0  /  TBili  1.2  /  DBili  x   /  AST  14  /  ALT  14  /  AlkPhos  164<H>      PTT - ( 30 Aug 2022 19:22 )  PTT:26.9 sec            TELEMETRY:    Echocardiogram:        EK Lead ECG:   Ventricular Rate 82 BPM    Atrial Rate 82 BPM    P-R Interval 178 ms <TRUNCATED> (22 @ 09:32)      CXR/Radiology:   CARDIO-ONCOLOGY CONSULTATION NOTE                                                                                                                                                                                       HPI: 72 year old man with history of DLBCL (tx with R-CHOP in , with relapse in 2009 treated with BR) now with follicular lymphoma, heart failure with reduced EF and mitral regurgitation, atrial flutter s/p ablation and a dual chamber PPM.       Prior Cancer Treatment:  2003: RCHOP x6, relapse 2009 treated with BR    PAST MEDICAL & SURGICAL HISTORY:  Non-Hodgkin's Lymphoma  DM type 2 (diabetes mellitus, type 2), Never on insulin  Essential hypertension  Rhinitis, allergic  Systolic heart failure  mitral regurgitation  Atrial flutter, unspecified type  Impaired memory  Cardiac pacemaker          FAMILY HISTORY:  Family history of CHF (congestive heart failure)  Mother    Family history of non-Hodgkin's lymphoma (Sibling)    Home Medications:  acetaminophen 325 mg oral tablet: 2 tab(s) orally every 6 hours, As needed, Moderate Pain (4 - 6) (27 Aug 2022 04:29)  carvedilol 25 mg oral tablet: 1 tab(s) orally 2 times a day with food (27 Aug 2022 04:29)  docusate sodium 100 mg oral capsule: 2 cap(s) orally once a day (at bedtime), As Needed (27 Aug 2022 04:29)  ivabradine 5 mg oral tablet: 1 tab(s) orally 2 times a day (with meals) (27 Aug 2022 04:29)  senna oral tablet: 2 tab(s) orally once a day (at bedtime), As Needed (27 Aug 2022 04:29)      Social History:  Social Hx  - He is currently retired. He used to be an .  - No significant environmental exposure to chemicals  - Lives by himself and his wife lives in [place].  - Former smoker. He smoked for 10 years less than 1 pack a day. He quit 20 years ago    Family hx  - Two brothers non Hodgkins lymphoma. At least one required chemotherapy. sister had cervical cancer first diagnosed  and then 3 years later with more cancer discovered  - Mother and father passed away from heart disease and diabetes. (27 Aug 2022 03:15)      Medications:  allopurinol 300 milliGRAM(s) Oral daily  atorvastatin 40 milliGRAM(s) Oral at bedtime  buMETAnide Injectable 2 milliGRAM(s) IV Push two times a day  chlorhexidine 2% Cloths 1 Application(s) Topical daily  chlorhexidine 4% Liquid 1 Application(s) Topical <User Schedule>  dextrose 5%. 1000 milliLiter(s) IV Continuous <Continuous>  dextrose 5%. 1000 milliLiter(s) IV Continuous <Continuous>  dextrose 50% Injectable 25 Gram(s) IV Push once  dextrose 50% Injectable 12.5 Gram(s) IV Push once  dextrose 50% Injectable 25 Gram(s) IV Push once  dextrose Oral Gel 15 Gram(s) Oral once PRN  glucagon  Injectable 1 milliGRAM(s) IntraMuscular once  heparin   Injectable 6500 Unit(s) IV Push every 6 hours PRN  heparin   Injectable 3000 Unit(s) IV Push every 6 hours PRN  heparin  Infusion.  Unit(s)/Hr IV Continuous <Continuous>  hydrALAZINE 10 milliGRAM(s) Oral every 8 hours  insulin lispro (ADMELOG) corrective regimen sliding scale   SubCutaneous Before meals and at bedtime  melatonin 3 milliGRAM(s) Oral at bedtime PRN  metoprolol tartrate 12.5 milliGRAM(s) Oral every 12 hours  multivitamin 1 Tablet(s) Oral daily  predniSONE   Tablet 100 milliGRAM(s) Oral daily  sodium chloride 0.9% lock flush 10 milliLiter(s) IV Push every 1 hour PRN      Review of systems:  10 point review of systems completed and are negative unless noted in HPI    Vitals:  T(C): 36.3 (22 @ 05:49), Max: 36.7 (22 @ 13:45)  HR: 84 (22 @ 05:49) (77 - 88)  BP: 111/66 (22 @ 05:49) (107/70 - 115/72)  BP(mean): --  RR: 18 (22 @ 05:49) (18 - 18)  SpO2: 93% (22 @ 05:49) (93% - 99%)    Daily     Daily Weight in k.5 (31 Aug 2022 08:40)        I&O's Summary    30 Aug 2022 07:01  -  31 Aug 2022 07:00  --------------------------------------------------------  IN: 720 mL / OUT: 975 mL / NET: -255 mL        Physical Exam:  Appearance: No apparent distress  HEENT: moist mucosa, anicteric sclerae.   Neck:   Cardiovascular: regular rate and rhythm. No murmur. No gallop. No friction rub.  Respiratory: Clear to auscultation bilaterally  Gastrointestinal: Soft, Non-tender, benign	  Skin: no clubbing or cyanosis.   Neurologic: No focal deficit  Extremities: warm. No edema.  Psychiatry: A & O x 3, Mood & affect appropriate  Vascular: pulse regular, equal    Labs:                        8.7    15.53 )-----------( 300      ( 31 Aug 2022 06:59 )             28.4         140  |  100  |  99<H>  ----------------------------<  135<H>  3.5   |  24  |  2.04<H>    Ca    9.9      31 Aug 2022 06:59  Phos  4.7       Mg     2.1         TPro  7.9  /  Alb  4.0  /  TBili  1.2  /  DBili  x   /  AST  14  /  ALT  14  /  AlkPhos  164<H>      PTT - ( 30 Aug 2022 19:22 )  PTT:26.9 sec            TELEMETRY:    Echocardiogram:        EK Lead ECG:   Ventricular Rate 82 BPM    Atrial Rate 82 BPM    P-R Interval 178 ms <TRUNCATED> (22 @ 09:32)      CXR/Radiology:   CARDIO-ONCOLOGY CONSULTATION NOTE                                                                                                                                                                                       HPI: 72 year old man with history of DLBCL (tx with R-CHOP in 2003, with relapse in 2009 treated with BR) now with follicular lymphoma, heart failure with reduced EF and mitral regurgitation, atrial flutter s/p ablation and a dual chamber PPM, now admitted for tumor lysis syndrome and pending initiation of inpatient treatment with obinutuzumab-COEP.    History obtained from the medical record because the patient has a memory impairment.       Prior Cancer Treatment:  2003: RCHOP x6, relapse 2009 treated with BR    PAST MEDICAL & SURGICAL HISTORY:  Non-Hodgkin's Lymphoma  DM type 2 (diabetes mellitus, type 2), Never on insulin  Essential hypertension  Rhinitis, allergic  Systolic heart failure  mitral regurgitation  Atrial flutter, unspecified type  Impaired memory  Cardiac pacemaker      FAMILY HISTORY:  Family history of CHF (congestive heart failure)  Mother    Family history of non-Hodgkin's lymphoma (Sibling)    Home Medications:  acetaminophen 325 mg oral tablet: 2 tab(s) orally every 6 hours, As needed, Moderate Pain (4 - 6) (27 Aug 2022 04:29)  carvedilol 25 mg oral tablet: 1 tab(s) orally 2 times a day with food (27 Aug 2022 04:29)  docusate sodium 100 mg oral capsule: 2 cap(s) orally once a day (at bedtime), As Needed (27 Aug 2022 04:29)  ivabradine 5 mg oral tablet: 1 tab(s) orally 2 times a day (with meals) (27 Aug 2022 04:29)  senna oral tablet: 2 tab(s) orally once a day (at bedtime), As Needed (27 Aug 2022 04:29)      Social History:  Social Hx  - He is currently retired. He used to be an .  - No significant environmental exposure to chemicals  - Lives by himself and his wife lives in [place].  - Former smoker. He smoked for 10 years less than 1 pack a day. He quit 20 years ago    Family hx  - Two brothers non Hodgkin's lymphoma. At least one required chemotherapy. sister had cervical cancer first diagnosed  and then 3 years later with more cancer discovered  - Mother and father passed away from heart disease and diabetes. (27 Aug 2022 03:15)      Medications:  allopurinol 300 milliGRAM(s) Oral daily  atorvastatin 40 milliGRAM(s) Oral at bedtime  buMETAnide Injectable 2 milliGRAM(s) IV Push two times a day  chlorhexidine 2% Cloths 1 Application(s) Topical daily  chlorhexidine 4% Liquid 1 Application(s) Topical <User Schedule>  dextrose 5%. 1000 milliLiter(s) IV Continuous <Continuous>  dextrose 5%. 1000 milliLiter(s) IV Continuous <Continuous>  dextrose 50% Injectable 25 Gram(s) IV Push once  dextrose 50% Injectable 12.5 Gram(s) IV Push once  dextrose 50% Injectable 25 Gram(s) IV Push once  dextrose Oral Gel 15 Gram(s) Oral once PRN  glucagon  Injectable 1 milliGRAM(s) IntraMuscular once  heparin   Injectable 6500 Unit(s) IV Push every 6 hours PRN  heparin   Injectable 3000 Unit(s) IV Push every 6 hours PRN  heparin  Infusion.  Unit(s)/Hr IV Continuous <Continuous>  hydrALAZINE 10 milliGRAM(s) Oral every 8 hours  insulin lispro (ADMELOG) corrective regimen sliding scale   SubCutaneous Before meals and at bedtime  melatonin 3 milliGRAM(s) Oral at bedtime PRN  metoprolol tartrate 12.5 milliGRAM(s) Oral every 12 hours  multivitamin 1 Tablet(s) Oral daily  predniSONE   Tablet 100 milliGRAM(s) Oral daily  sodium chloride 0.9% lock flush 10 milliLiter(s) IV Push every 1 hour PRN      Review of systems:  10 point review of systems completed and are negative unless noted in HPI    Vitals:  T(C): 36.3 (22 @ 05:49), Max: 36.7 (22 @ 13:45)  HR: 84 (22 @ 05:49) (77 - 88)  BP: 111/66 (22 @ 05:49) (107/70 - 115/72)  BP(mean): --  RR: 18 (22 @ 05:49) (18 - 18)  SpO2: 93% (22 @ 05:49) (93% - 99%)    Daily     Daily Weight in k.5 (31 Aug 2022 08:40)        I&O's Summary    30 Aug 2022 07:01  -  31 Aug 2022 07:00  --------------------------------------------------------  IN: 720 mL / OUT: 975 mL / NET: -255 mL        Physical Exam:  Appearance: No apparent distress  HEENT: moist mucosa, anicteric sclerae.   Neck:   Cardiovascular: regular rate and rhythm. No murmur. No gallop. No friction rub.  Respiratory: Clear to auscultation bilaterally  Gastrointestinal: Soft, Non-tender, benign	  Skin: no clubbing or cyanosis.   Neurologic: No focal deficit  Extremities: warm. No edema.  Psychiatry: A & O x 3, Mood & affect appropriate  Vascular: pulse regular, equal    Labs:                        8.7    15.53 )-----------( 300      ( 31 Aug 2022 06:59 )             28.4         140  |  100  |  99<H>  ----------------------------<  135<H>  3.5   |  24  |  2.04<H>    Ca    9.9      31 Aug 2022 06:59  Phos  4.7       Mg     2.1         TPro  7.9  /  Alb  4.0  /  TBili  1.2  /  DBili  x   /  AST  14  /  ALT  14  /  AlkPhos  164<H>      PTT - ( 30 Aug 2022 19:22 )  PTT:26.9 sec      TELEMETRY:    Echocardiogram:  2022:  Conclusions:  1. Tethered mitral valve leaflets with normal opening.  Mitral annular calcification. Severe mitral regurgitation.  2. Calcified trileaflet aortic valve with normal opening.  3. Mild left ventricular enlargement.  4. Normal right atrium. A device wire is noted in the right heart.  5. Right ventricular enlargement with decreased right ventricular systolic function.  6. Normal tricuspid valve. Mild-moderate tricuspid regurgitation.  7. Estimated pulmonary artery systolic pressure equals 52 mm Hg, assuming right atrial pressure equals 8 mm Hg,  consistent with moderate pulmonary pressures.  8. Bilateral pleural effusions.  ------------------------------------------------------------------------  Confirmedon  2022 - 16:37:50 by Chyna Teague M.D.  ------------------------------------------------------------------------        EK Lead ECG:   Ventricular Rate 82 BPM    Atrial Rate 82 BPM    P-R Interval 178 ms <TRUNCATED> (22 @ 09:32)      CXR/Radiology:   CARDIO-ONCOLOGY CONSULTATION NOTE                                                                                                                                                                                       HPI: 72 year old man with history of DLBCL (tx with R-CHOP in 2003, with relapse in 2009 treated with BR) now with follicular lymphoma, heart failure with reduced EF and mitral regurgitation, atrial flutter s/p ablation and a dual chamber PPM, now admitted for tumor lysis syndrome and pending initiation of inpatient treatment with obinutuzumab-COEP.    History obtained from the medical record because the patient has a memory impairment.     He denies any chest pain, shortness of breath, edema, palpitations. Lives with his daughter who administers his medications.      Prior Cancer Treatment:  2003: RCHOP x6, relapse 2009 treated with BR    PAST MEDICAL & SURGICAL HISTORY:  Non-Hodgkin's Lymphoma  DM type 2 (diabetes mellitus, type 2), Never on insulin  Essential hypertension  Rhinitis, allergic  Systolic heart failure  mitral regurgitation  Atrial flutter, unspecified type  Impaired memory  Cardiac pacemaker      FAMILY HISTORY:  Family history of CHF (congestive heart failure)  Mother    Family history of non-Hodgkin's lymphoma (Sibling)    Home Medications:  acetaminophen 325 mg oral tablet: 2 tab(s) orally every 6 hours, As needed, Moderate Pain (4 - 6) (27 Aug 2022 04:29)  carvedilol 25 mg oral tablet: 1 tab(s) orally 2 times a day with food (27 Aug 2022 04:29)  docusate sodium 100 mg oral capsule: 2 cap(s) orally once a day (at bedtime), As Needed (27 Aug 2022 04:29)  ivabradine 5 mg oral tablet: 1 tab(s) orally 2 times a day (with meals) (27 Aug 2022 04:29)  senna oral tablet: 2 tab(s) orally once a day (at bedtime), As Needed (27 Aug 2022 04:29)      Social History:  Social Hx  - He is currently retired. He used to be an .  - No significant environmental exposure to chemicals  - Lives by himself and his wife lives in [place].  - Former smoker. He smoked for 10 years less than 1 pack a day. He quit 20 years ago    Family hx  - Two brothers non Hodgkin's lymphoma. At least one required chemotherapy. sister had cervical cancer first diagnosed  and then 3 years later with more cancer discovered  - Mother and father passed away from heart disease and diabetes. (27 Aug 2022 03:15)      Medications:  allopurinol 300 milliGRAM(s) Oral daily  atorvastatin 40 milliGRAM(s) Oral at bedtime  buMETAnide Injectable 2 milliGRAM(s) IV Push two times a day  chlorhexidine 2% Cloths 1 Application(s) Topical daily  chlorhexidine 4% Liquid 1 Application(s) Topical <User Schedule>  dextrose 5%. 1000 milliLiter(s) IV Continuous <Continuous>  dextrose 5%. 1000 milliLiter(s) IV Continuous <Continuous>  dextrose 50% Injectable 25 Gram(s) IV Push once  dextrose 50% Injectable 12.5 Gram(s) IV Push once  dextrose 50% Injectable 25 Gram(s) IV Push once  dextrose Oral Gel 15 Gram(s) Oral once PRN  glucagon  Injectable 1 milliGRAM(s) IntraMuscular once  heparin   Injectable 6500 Unit(s) IV Push every 6 hours PRN  heparin   Injectable 3000 Unit(s) IV Push every 6 hours PRN  heparin  Infusion.  Unit(s)/Hr IV Continuous <Continuous>  hydrALAZINE 10 milliGRAM(s) Oral every 8 hours  insulin lispro (ADMELOG) corrective regimen sliding scale   SubCutaneous Before meals and at bedtime  melatonin 3 milliGRAM(s) Oral at bedtime PRN  metoprolol tartrate 12.5 milliGRAM(s) Oral every 12 hours  multivitamin 1 Tablet(s) Oral daily  predniSONE   Tablet 100 milliGRAM(s) Oral daily  sodium chloride 0.9% lock flush 10 milliLiter(s) IV Push every 1 hour PRN      Review of systems:  10 point review of systems completed and are negative unless noted in HPI    Vitals:  T(C): 36.3 (22 @ 05:49), Max: 36.7 (22 @ 13:45)  HR: 84 (22 @ 05:49) (77 - 88)  BP: 111/66 (22 @ 05:49) (107/70 - 115/72)  BP(mean): --  RR: 18 (22 @ 05:49) (18 - 18)  SpO2: 93% (22 @ 05:49) (93% - 99%)    Daily     Daily Weight in k.5 (31 Aug 2022 08:40)        I&O's Summary    30 Aug 2022 07:01  -  31 Aug 2022 07:00  --------------------------------------------------------  IN: 720 mL / OUT: 975 mL / NET: -255 mL        Physical Exam:  Appearance: No apparent distress  HEENT: moist mucosa, anicteric sclerae.   Neck: No jugular venous distension  Cardiovascular: regular rate and rhythm. No murmur. No gallop. No friction rub.  Respiratory: Clear to auscultation bilaterally  Gastrointestinal: benign	  Skin: no clubbing or cyanosis.   Neurologic: No focal deficit  Extremities: warm. No edema.  Psychiatry: Mood & affect appropriate  Vascular: pulse regular, equal    Labs:                        8.7    15.53 )-----------( 300      ( 31 Aug 2022 06:59 )             28.4     08-    140  |  100  |  99<H>  ----------------------------<  135<H>  3.5   |  24  |  2.04<H>    Ca    9.9      31 Aug 2022 06:59  Phos  4.7     -  Mg     2.1     -    TPro  7.9  /  Alb  4.0  /  TBili  1.2  /  DBili  x   /  AST  14  /  ALT  14  /  AlkPhos  164<H>  08-31    PTT - ( 30 Aug 2022 19:22 )  PTT:26.9 sec      TELEMETRY:    Echocardiogram:  2022:  Conclusions:  1. Tethered mitral valve leaflets with normal opening.  Mitral annular calcification. Severe mitral regurgitation.  2. Calcified trileaflet aortic valve with normal opening.  3. Mild left ventricular enlargement.  4. Normal right atrium. A device wire is noted in the right heart.  5. Right ventricular enlargement with decreased right ventricular systolic function.  6. Normal tricuspid valve. Mild-moderate tricuspid regurgitation.  7. Estimated pulmonary artery systolic pressure equals 52 mm Hg, assuming right atrial pressure equals 8 mm Hg,  consistent with moderate pulmonary pressures.  8. Bilateral pleural effusions.  EF (Modified Escobar Rule): 20 %  --------------------------------------------------------------------  Confirmed on  2022 - 16:37:50 by Chyna Teague M.D.  ------------------------------------------------------------------------    2019:  Conclusions:  Endocardial visualization enhanced with intravenous injection of Ultrasonic Enhancing Agent (Definity).  Mild left ventricular enlargement.  Estimated ejection fraction 40%. Diffuse hypokinesis.  Functional mitral regurgitation, probably severe.  Severe pulmonary hypertension.  ------------------------------------------------------------------------  Confirmed on  2019 - 17:45:56 by Juan Alberto Garcia M.D.  ------------------------------------------------------------------------          EK Lead ECG:   Ventricular Rate 82 BPM    Atrial Rate 82 BPM    P-R Interval 178 ms <TRUNCATED> (22 @ 09:32)      CXR/Radiology:

## 2022-08-31 NOTE — PROGRESS NOTE ADULT - ASSESSMENT
72M hx DLBCL (tx with R-CHOP in 2003, with relapse in 2009 treated with BR) now with multiple areas of palpable lymphadenopathy with biopsies shown to be at least grade IIIA follicular lymphoma. Has had significant weight loss >20lbs in the last 6 months. Also noted to have an IgG lambda, IgG Kappa, and IgM Lambda monoclonal gammopathies. Prior to being sent to the ED his labs were suggestive of TLS with UA 13.7 s/p 3mg rasburicase on 8/23.      #Follicular Lymphoma  #Hemolytic Anemia  Follows with Dr. Cordova at [facility]. PET/CT 8/2022 with FDG avid cervical, b/l supraclavicular, chest and a few abdominal LNs, intramuscular masses in the left posterior chest and left upper thigh, scattered FDG avid subcutaneous soft tissue nodules with trace right pleural effusion and moderate left pleural effusion with a loculated fluid in the RML, splenomegaly. There were areas of > 20 SUV on the PET-CT, repeat whole lymph node biopsy was requested to confirm suspected diagnosis of Grade 3B FL or transformed large cell lymphoma however patient presented with evidence of TLS and now with nausea/fatigue sent to the hospital for further evaluation. s/p biopsy of both cervical and left axillary LNs which showed grade 3A follicular lymphoma positive for BCL6 (3q27) breakpoint translocation and negative for MYC Rearrangement or BCL2-IGH gene rearrangement [translocation t(14;18) present in ~ 85% of [place]].     - outpatient had evidence of TLS with labs outpatient UA 13.7. G6PD resulted 4.8  - Initial , indirect bili 1.8, hapto < 20  - Peripheral smear was reviewed; no degmacytes observed  - likely has rasburicase related hemolysis, would transfuse to keep Hb >8   - Due to renal function, please reduce allopurinol to 100mg qd  - IVF   - Trend CBC, CMP, hemolysis labs daily  - Methemoglobin wnl 0.2%, no need for vitamin C  - Discussed with primary hematologist regarding excisional biopsy. Prefer to have definitive diagnosis prior to treatment; currently only have a presumed diagnosis. However, would like to avoid delaying treatment so please have biopsy done as soon as possible  - Started prednisone 100mg daily x5 days with intent to start Obinutuzumab-COEP inpatient (tentatively plan for test dose of Obinutuzumab on 9/1 and full regimen on 9/2)  - Please replace double-lumen PICC line given intent to start treatment inpatient

## 2022-08-31 NOTE — PROGRESS NOTE ADULT - ASSESSMENT
72 year old male with significant history of HTN, CKD stage II, complete heart block (PPM in place), HLD, HFrEF (45% in 2019), and DLBCL (tx with R-CHOP in 2003, with relapse in 2009 treated with BR) now with recently diagnosed grade 3 follicular lymphoma on 8/23 who presents with rasburicase associated G6PD hemolytic anemia in setting of TLS on 8/23 and AHRF.     Patient is being medically optimized for excisional biopsy under generalized anesthesia for lymphoma workup.   72 year old male with significant history of HTN, CKD stage II, complete heart block (PPM in place), HLD, HFrEF (45% in 2019), and DLBCL (tx with R-CHOP in 2003, with relapse in 2009 treated with BR) now with recently diagnosed grade 3 follicular lymphoma on 8/23 who presents with rasburicase associated G6PD hemolytic anemia in setting of TLS on 8/23 and AHRF.

## 2022-08-31 NOTE — PROGRESS NOTE ADULT - PROBLEM SELECTOR PLAN 3
History of HFrEF EF 45%, diffuse LV hypokinesis, mod pulmHTN; followed by cardiology outpatient, elevated proBNP with uptrending troponins, now downtrending. TTE (8/27) ef=20%, severe MR, mild LV enlargement, normal RA, RV enlargement with decreased RVSF, mild-mod tricuspid regurg, moderate pulm pressures, b/l pleural effusions   - c/w metoprolol tartrate 12.5mg BID (for discharge consider metoprolol succinate vs. resuming home Carvedilol at lower dose as per Cards)  - c/w hYDRALAZINE 10MG q8h for afterload reduction  - consider adding entresto (in place of home lisinopril) and SGLT2-inhibitor if renal function tolerates History of HFrEF EF 45%, diffuse LV hypokinesis, mod pulmHTN; followed by cardiology outpatient, elevated proBNP with uptrending troponins, now downtrending. TTE (8/27) ef=20%, severe MR, mild LV enlargement, normal RA, RV enlargement with decreased RVSF, mild-mod tricuspid regurg, moderate pulm pressures, b/l pleural effusions   - c/w metoprolol tartrate 12.5mg BID (for discharge consider metoprolol succinate vs. resuming home Carvedilol at lower dose as per Cards)  - c/w hYDRALAZINE 10MG q8h for afterload reduction  - c/w bumex 2mg BID  - consider adding entresto (in place of home lisinopril) and SGLT2-inhibitor if renal function tolerates

## 2022-08-31 NOTE — CHART NOTE - NSCHARTNOTEFT_GEN_A_CORE
Okay to place PICC with low GFR. Pt had a picc line that was dislodged. Can be placed by PICC team.   D/w Dr. Schulz.

## 2022-08-31 NOTE — PROGRESS NOTE ADULT - ATTENDING COMMENTS
Patient appears delirious today as was disoriented. No fevers reported. Of note was started on prednisone 100 mg daily for plan to receive obinutuzumab-COEP starting on 9/1. On exam, oriented only to name but thought he was getting dental work and year was 1980s, agitated, JVP elevated, grade III/VI sysotlic murmur in axilla, CTAB, nontender abdomen, trace edema b/l. Labs reviewed - WBC 15 (uptrending),  (downtrending), BUN/Cr 99/2.04 (stably elevated), Tbili 1.2 (improved from 5 on admission), BNP 68k. Overall stage C HF, NYHA class III/IV with severe MR and mod TR with EF 15-20% with aggressive lymphoma.   - would evaluate causes of delirium  - WBC likely 2/2 steroids, however would panculture  - c/w bumex 2 mg q12 IV  - prognosis guarded

## 2022-08-31 NOTE — PROGRESS NOTE ADULT - ASSESSMENT
71 yo M w/ PMH CHB s/p PPM upgraded to CRT-P, HFrEF 2/2 chemo, DLBCL s/p chemo, HTN, nonobstructive CAD, Aflutter on apixaban who presented w/ SOB and anemia. 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 He was started on rasburicase for TLS but with notable G6PD hemolytics anemia. Was told to come into the ED and given 3u PRBC in total so far with improvement of anemia. He was also started on BiPAP given his tachypnea. CXRAY showed R pleural effusion with known R middle lobe opacity found to be in heart failure exacerbation.     Patient planned for excisional biopsy under general anesthesia, patient severe MR on ECHO, still fluid overloaded, and elevated troponin without re-evaluation of his coronaries. Would not pursue general anesthesia at this time, would recommend if possible procedure under local anesthesia.          Echo:  8/27/22: LVEF 20%, decreased RV function, severe MR  11/7/2019: LVEF 40%, moderate pulmonary hypertension, enlarged LA size, severe mitral regurgitation    Cardiac Cath: 5/2019, 70% 1st diagonal, 60% distal circumflex, otherwise no occlusive disease      Pacemaker: 9/30/2019, [employer] [id number] BiV DDDR  71 yo M w/ PMH CHB s/p PPM upgraded to CRT-P, HFrEF 2/2 chemo, DLBCL s/p chemo, HTN, nonobstructive CAD, Aflutter on apixaban who presented w/ SOB and anemia. 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 He was started on rasburicase for TLS but with notable G6PD hemolytics anemia. Was told to come into the ED and given 3u PRBC in total so far with improvement of anemia. He was also started on BiPAP given his tachypnea. CXRAY showed R pleural effusion with known R middle lobe opacity found to be in heart failure exacerbation.     Patient planned for excisional biopsy under general anesthesia, patient severe MR on ECHO, still fluid overloaded, and elevated troponin without re-evaluation of his coronaries. Would not pursue general anesthesia at this time, would recommend if possible procedure under local anesthesia, however would have primary team perform assessment of acute change in neurological status.        Echo:  8/27/22: LVEF 20%, decreased RV function, severe MR  11/7/2019: LVEF 40%, moderate pulmonary hypertension, enlarged LA size, severe mitral regurgitation    Cardiac Cath: 5/2019, 70% 1st diagonal, 60% distal circumflex, otherwise no occlusive disease      Pacemaker: 9/30/2019, [email or web address] [id number] BiV DDDR

## 2022-08-31 NOTE — PROGRESS NOTE ADULT - PROBLEM SELECTOR PLAN 6
Presenting with respiratory distress possibly 2/2 acute hemolytic anemia and HF exacerbation vs. ACS r/o (pt with known chronic right sided loculated effusion). Started on BiPAP at 11 ipap/5 epap  - now off BiPAP on room air saturating well, OOB  - bumex 2mg BID, -600cc 8/30 Presenting with respiratory distress possibly 2/2 acute hemolytic anemia and HF exacerbation vs. ACS r/o (pt with known chronic right sided loculated effusion). Started on BiPAP at 11 ipap/5 epap  - now off BiPAP on room air saturating well, OOB  - diuresis as above

## 2022-08-31 NOTE — PROGRESS NOTE ADULT - ASSESSMENT
72 year old male with significant history of HTN, CKD stage III, complete heart block (PPM in place), HLD, HFrEF (45% in 2019), and DLBCL (tx with R-CHOP in 2003, with relapse in 2009 treated with BR) now with recently diagnosed grade 3 follicular lymphoma on 8/23 who presents with anemia and SOB, patient was pending a surgical biopsy on 8/25. However, pt was found to be in TLS with associated nausea and fatigue on 8/23; at this time rasburicase was started and lowered the uric acid from 13 to 6, however, pt developed rasburicase-triggered G6PD hemolytic anemia with associated jaundice and dark colored urine as well as hemoglobin of 5.7 and hypotension on 8/26 requiring further evaluation. During hosp pt had CHF as well. started on iv bumex.  had echo revealing e 20%.     1- CKD III   2- CHF   3- Tumor lysis   4- HTN       cr now higher   pt is fluid overloaded still therefore needs diuretics at present. for now cont with diuretics with bumex 2mg iv bid   given LDH seems to have been steady range as well as haptoglobin in normal range; suspect he is at baseline hemolysis from his underlying disease   will hold ivf   cont hydralazine 10 mg tid   allopurinol 300 mg daily   strict I/O  keep O> I   d/w pt daughter   d/w chf team

## 2022-08-31 NOTE — PROGRESS NOTE ADULT - PROBLEM SELECTOR PLAN 1
-Would c/w bumex 2 mg IV BID   -C/w Hydralazine 10 mg q8h   -C/w metoprolol tartrate 12.5 BID  -Please obtain bladder scan to r/o obstruction, can communicate with me via teams with results.

## 2022-08-31 NOTE — PROGRESS NOTE ADULT - SUBJECTIVE AND OBJECTIVE BOX
Progress Note    08-27-22 (4d)    Patient is a 72y old  Male who presents with a chief complaint of Dyspnea (29 Aug 2022 12:47)      Subjective / Overnight Events :  - No acute events overnight.  - Pt seen and examined at bedside.     Additional ROS (if any):    MEDICATIONS  (STANDING):  allopurinol 300 milliGRAM(s) Oral daily  atorvastatin 40 milliGRAM(s) Oral at bedtime  buMETAnide Injectable 2 milliGRAM(s) IV Push two times a day  chlorhexidine 2% Cloths 1 Application(s) Topical daily  chlorhexidine 4% Liquid 1 Application(s) Topical <User Schedule>  dextrose 5%. 1000 milliLiter(s) (50 mL/Hr) IV Continuous <Continuous>  dextrose 5%. 1000 milliLiter(s) (100 mL/Hr) IV Continuous <Continuous>  dextrose 50% Injectable 25 Gram(s) IV Push once  dextrose 50% Injectable 12.5 Gram(s) IV Push once  dextrose 50% Injectable 25 Gram(s) IV Push once  glucagon  Injectable 1 milliGRAM(s) IntraMuscular once  heparin  Infusion.  Unit(s)/Hr (14 mL/Hr) IV Continuous <Continuous>  hydrALAZINE 10 milliGRAM(s) Oral every 8 hours  insulin lispro (ADMELOG) corrective regimen sliding scale   SubCutaneous Before meals and at bedtime  metoprolol tartrate 12.5 milliGRAM(s) Oral every 12 hours  multivitamin 1 Tablet(s) Oral daily  predniSONE   Tablet 100 milliGRAM(s) Oral daily    MEDICATIONS  (PRN):  dextrose Oral Gel 15 Gram(s) Oral once PRN Blood Glucose LESS THAN 70 milliGRAM(s)/deciliter  heparin   Injectable 6500 Unit(s) IV Push every 6 hours PRN For aPTT less than 40  heparin   Injectable 3000 Unit(s) IV Push every 6 hours PRN For aPTT between 40 - 57  melatonin 3 milliGRAM(s) Oral at bedtime PRN Insomnia  sodium chloride 0.9% lock flush 10 milliLiter(s) IV Push every 1 hour PRN Pre/post blood products, medications, blood draw, and to maintain line patency          PHYSICAL EXAM:  Vital Signs Last 24 Hrs  T(C): 36.3 (31 Aug 2022 05:49), Max: 36.7 (30 Aug 2022 13:45)  T(F): 97.4 (31 Aug 2022 05:49), Max: 98.1 (30 Aug 2022 13:45)  HR: 84 (31 Aug 2022 05:49) (77 - 88)  BP: 111/66 (31 Aug 2022 05:49) (107/70 - 115/72)  BP(mean): --  RR: 18 (31 Aug 2022 05:49) (18 - 18)  SpO2: 93% (31 Aug 2022 05:49) (93% - 99%)    Parameters below as of 31 Aug 2022 05:49  Patient On (Oxygen Delivery Method): room air        I&O's Summary    29 Aug 2022 07:01  -  30 Aug 2022 07:00  --------------------------------------------------------  IN: 480 mL / OUT: 150 mL / NET: 330 mL    30 Aug 2022 07:01  -  31 Aug 2022 06:54  --------------------------------------------------------  IN: 720 mL / OUT: 975 mL / NET: -255 mL        General: NAD, non-toxic appearing   HEENT: PERRLA, EOMi, no scleral icterus  CV: RRR, normal S1 and S2, no m/r/g  Lungs: normal respiratory effort. CTAB, no wheezes, rales, or rhonchi  Abd: soft, nontender, nondistended  Ext: no edema, 2+ peripheral pulses   Pysch: AAOx3, appropriate affect   Neuro: grossly non-focal, moving all extremities spontaneously   Skin: no rashes or lesions     LABS:  CAPILLARY BLOOD GLUCOSE      POCT Blood Glucose.: 288 mg/dL (30 Aug 2022 21:27)  POCT Blood Glucose.: 217 mg/dL (30 Aug 2022 16:20)  POCT Blood Glucose.: 219 mg/dL (30 Aug 2022 12:16)  POCT Blood Glucose.: 183 mg/dL (30 Aug 2022 07:31)                              8.6    12.06 )-----------( 270      ( 30 Aug 2022 11:15 )             28.3       WBC Trend: 12.06<--, 10.36<--, 8.55<--  Hb Trend: 8.6<--, 9.2<--, 8.4<--, 8.4<--, 7.5<--    08-30    142  |  100  |  98<H>  ----------------------------<  202<H>  4.5   |  24  |  1.78<H>    Ca    9.5      30 Aug 2022 11:15  Phos  4.1     08-30  Mg     2.1     08-30    TPro  8.0  /  Alb  4.0  /  TBili  1.4<H>  /  DBili  x   /  AST  23  /  ALT  15  /  AlkPhos  168<H>  08-30    PTT - ( 30 Aug 2022 19:22 )  PTT:26.9 sec              RADIOLOGY & ADDITIONAL TESTS: Reviewed Progress Note    08-27-22 (4d)    Patient is a 72y old  Male who presents with a chief complaint of Dyspnea (29 Aug 2022 12:47)      Subjective / Overnight Events :  - No acute events overnight. Pulled out PICC overnight, reported confusion by nurse.   - Pt seen and examined at bedside. Patient without complaints, is not orientated and is confused. Not aware of what is going on.     Additional ROS (if any):    MEDICATIONS  (STANDING):  allopurinol 300 milliGRAM(s) Oral daily  atorvastatin 40 milliGRAM(s) Oral at bedtime  buMETAnide Injectable 2 milliGRAM(s) IV Push two times a day  chlorhexidine 2% Cloths 1 Application(s) Topical daily  chlorhexidine 4% Liquid 1 Application(s) Topical <User Schedule>  dextrose 5%. 1000 milliLiter(s) (50 mL/Hr) IV Continuous <Continuous>  dextrose 5%. 1000 milliLiter(s) (100 mL/Hr) IV Continuous <Continuous>  dextrose 50% Injectable 25 Gram(s) IV Push once  dextrose 50% Injectable 12.5 Gram(s) IV Push once  dextrose 50% Injectable 25 Gram(s) IV Push once  glucagon  Injectable 1 milliGRAM(s) IntraMuscular once  heparin  Infusion.  Unit(s)/Hr (14 mL/Hr) IV Continuous <Continuous>  hydrALAZINE 10 milliGRAM(s) Oral every 8 hours  insulin lispro (ADMELOG) corrective regimen sliding scale   SubCutaneous Before meals and at bedtime  metoprolol tartrate 12.5 milliGRAM(s) Oral every 12 hours  multivitamin 1 Tablet(s) Oral daily  predniSONE   Tablet 100 milliGRAM(s) Oral daily    MEDICATIONS  (PRN):  dextrose Oral Gel 15 Gram(s) Oral once PRN Blood Glucose LESS THAN 70 milliGRAM(s)/deciliter  heparin   Injectable 6500 Unit(s) IV Push every 6 hours PRN For aPTT less than 40  heparin   Injectable 3000 Unit(s) IV Push every 6 hours PRN For aPTT between 40 - 57  melatonin 3 milliGRAM(s) Oral at bedtime PRN Insomnia  sodium chloride 0.9% lock flush 10 milliLiter(s) IV Push every 1 hour PRN Pre/post blood products, medications, blood draw, and to maintain line patency          PHYSICAL EXAM:  Vital Signs Last 24 Hrs  T(C): 36.3 (31 Aug 2022 05:49), Max: 36.7 (30 Aug 2022 13:45)  T(F): 97.4 (31 Aug 2022 05:49), Max: 98.1 (30 Aug 2022 13:45)  HR: 84 (31 Aug 2022 05:49) (77 - 88)  BP: 111/66 (31 Aug 2022 05:49) (107/70 - 115/72)  BP(mean): --  RR: 18 (31 Aug 2022 05:49) (18 - 18)  SpO2: 93% (31 Aug 2022 05:49) (93% - 99%)    Parameters below as of 31 Aug 2022 05:49  Patient On (Oxygen Delivery Method): room air        I&O's Summary    29 Aug 2022 07:01  -  30 Aug 2022 07:00  --------------------------------------------------------  IN: 480 mL / OUT: 150 mL / NET: 330 mL    30 Aug 2022 07:01  -  31 Aug 2022 06:54  --------------------------------------------------------  IN: 720 mL / OUT: 975 mL / NET: -255 mL        General: jaundiced.   HEENT: PERRLA, EOMi, no scleral icterus  CV: RRR, normal S1 and S2, no m/r/g  Lungs: normal respiratory effort. CTAB, no wheezes, rales, or rhonchi  Abd: soft, nontender, nondistended  Ext: 2+ pitting edema B/L, 2+ peripheral pulses   Pysch: AOx1  Neuro: grossly non-focal, moving all extremities spontaneously   Skin: no rashes or lesions     LABS:  CAPILLARY BLOOD GLUCOSE      POCT Blood Glucose.: 288 mg/dL (30 Aug 2022 21:27)  POCT Blood Glucose.: 217 mg/dL (30 Aug 2022 16:20)  POCT Blood Glucose.: 219 mg/dL (30 Aug 2022 12:16)  POCT Blood Glucose.: 183 mg/dL (30 Aug 2022 07:31)                              8.6    12.06 )-----------( 270      ( 30 Aug 2022 11:15 )             28.3       WBC Trend: 12.06<--, 10.36<--, 8.55<--  Hb Trend: 8.6<--, 9.2<--, 8.4<--, 8.4<--, 7.5<--    08-30    142  |  100  |  98<H>  ----------------------------<  202<H>  4.5   |  24  |  1.78<H>    Ca    9.5      30 Aug 2022 11:15  Phos  4.1     08-30  Mg     2.1     08-30    TPro  8.0  /  Alb  4.0  /  TBili  1.4<H>  /  DBili  x   /  AST  23  /  ALT  15  /  AlkPhos  168<H>  08-30    PTT - ( 30 Aug 2022 19:22 )  PTT:26.9 sec              RADIOLOGY & ADDITIONAL TESTS: Reviewed

## 2022-08-31 NOTE — PROGRESS NOTE ADULT - PROBLEM SELECTOR PLAN 5
- Trop T 225-->476 -->495-->379, suspected demand ischemia in setting of non-obstructive CAD  - EKG with prolonged QTc 500s from 600s   - trop now downtrending, no further workup required

## 2022-08-31 NOTE — PROGRESS NOTE ADULT - PROBLEM SELECTOR PLAN 9
- afib, on home AC  - v paced on tele  - ICD interrogation wnl  - on heparin gtt - afib, on home AC  - v paced on tele  - ICD interrogation wnl  - hold AC iso pending biopsy

## 2022-08-31 NOTE — CONSULT NOTE ADULT - ASSESSMENT
72 year old man with history of DLBCL (tx with R-CHOP in 2003, with relapse in 2009 treated with BR) now with follicular lymphoma, heart failure with reduced EF and mitral regurgitation, atrial flutter s/p ablation and a dual chamber PPM, now admitted for tumor lysis syndrome and pending initiation of inpatient treatment with obinutuzumab-COEP. HsTnT 475 ng/L, downtrending. ECG AV paced. pro-BNP 67,000 from 28,000 in 2019.     72 year old man with history of DLBCL (tx with R-CHOP in 2003, with relapse in 2009 treated with BR) now with follicular lymphoma, heart failure with reduced EF and mitral regurgitation, atrial flutter s/p ablation and a dual chamber PPM, now admitted for tumor lysis syndrome and pending initiation of inpatient treatment with obinutuzumab-COEP. HsTnT 475 ng/L, downtrending. ECG AV paced. pro-BNP 67,000 from 28,000 in 2019.    He is responding to diuresis.    His systolic function appears slightly worse on his current echo, but is otherwise not significantly different from 2019. This is likely a mixed picture of non-ischemic cardiomyopathy from hypertensive heart disease with non-adherence to GDMT and prior anthracycline exposure. Given acute decompensated heart failure, an anthracycline sparing regimen like the above is preferable. He would be high risk for anthracycline toxicity because of age, cumulative dose, and reduced LVEF.    Would repeat echo once volume status is optimized.  Resume ACE or switch to Entresto now to optimize medical therapy for HF, with careful monitoring of renal function and potassium.  Agree with afterload reduction given significant MR  Diuresis as per HF/Nephrology, appreciate care.  add SGLT2 if possible  Continue statin.  Continue anticoagulation (in between procedures) and while platelets > 75    Please feel free to call if I may be of any assistance with his care.    Cardio-Oncology to see upon request.    CIARRA Lange MD [facility]  Cardio-Oncology

## 2022-08-31 NOTE — PROGRESS NOTE ADULT - ATTENDING COMMENTS
The patient is more alert today and able to discuss hospital stay and plan of care. He has a 4 cm subcutaneous mass that is moderately firm in consistency; mass is located to the left of midline in thoracic region approximately T 7.  We are requesting surgical excision for histology and further IHC studies.

## 2022-08-31 NOTE — PROGRESS NOTE ADULT - PROBLEM SELECTOR PLAN 8
- possibly i/s/o medication induced QT prolongation  - EKG 8/26 1638, rate 100 bpm,  ms, QTc 691 ms, ventricular paced  - EKG 8/26 2100, rate 94 bpm, Ekaterina 156, , QTc 547 ms, ventricular paced   - EKG 8/30 QTc 525ms, ventricular paced

## 2022-08-31 NOTE — PROGRESS NOTE ADULT - SUBJECTIVE AND OBJECTIVE BOX
****************************************  Pancho Wilkinson PGY5  Hematology/Oncology  [phone]  ****************************************  SUBJECTIVE  Patient seen and examined at bedside. No acute events overnight  Denied HA, CP, SOB, n/v/d/c , fevers, chills  Patient     OBJECTIVE   Vital Signs Last 24 Hrs  T(C): 36.4 (31 Aug 2022 11:59), Max: 36.7 (30 Aug 2022 13:45)  T(F): 97.5 (31 Aug 2022 11:59), Max: 98.1 (30 Aug 2022 13:45)  HR: 84 (31 Aug 2022 11:59) (79 - 88)  BP: 121/76 (31 Aug 2022 11:59) (107/70 - 121/76)  BP(mean): --  RR: 18 (31 Aug 2022 11:59) (18 - 18)  SpO2: 98% (31 Aug 2022 11:59) (93% - 98%)    Parameters below as of 31 Aug 2022 11:59  Patient On (Oxygen Delivery Method): room air      LABS  08-30-22 @ 07:01  -  08-31-22 @ 07:00  --------------------------------------------------------  IN: 734 mL / OUT: 975 mL / NET: -241 mL    08-31-22 @ 07:01  -  08-31-22 @ 12:02  --------------------------------------------------------  IN: 42 mL / OUT: 0 mL / NET: 42 mL      PHYSICAL EXAM:  Constitutional: AAOx3, NAD,   Eyes: PERRL, EOMI, sclera non-icteric  Neck: supple, no masses, no JVD  Respiratory: CTA b/l, good air entry b/l, no wheezing, rhonchi, rales, with normal respiratory effort and no intercostal retractions  Cardiovascular: RRR, normal S1S2, no M/R/G  Gastrointestinal: soft, NTND, no masses palpable, BS normal in all four quadrants, no HSM  Extremities:  no c/c/e  Neurological: A&Ox2  Skin: Normal temperature        LABS                        8.7    15.53 )-----------( 300      ( 31 Aug 2022 06:59 )             28.4       08-31    140  |  100  |  99<H>  ----------------------------<  135<H>  3.5   |  24  |  2.04<H>    Ca    9.9      31 Aug 2022 06:59  Phos  4.7     08-31  Mg     2.1     08-31    TPro  7.9  /  Alb  4.0  /  TBili  1.2  /  DBili  x   /  AST  14  /  ALT  14  /  AlkPhos  164<H>  08-31          Follow Up:      RADIOLOGY:

## 2022-08-31 NOTE — PROGRESS NOTE ADULT - PROBLEM SELECTOR PLAN 1
s/p rasburicase  - uric acid 13-->6, potassium stable   - now on allopurinol 300mg as per nephro to prevent uric acid from increasing again s/p rasburicase  - uric acid 13-->6, potassium stable   - was on allopurinol 300mg but now on 100mg as per heme/onc

## 2022-09-01 DIAGNOSIS — G93.40 ENCEPHALOPATHY, UNSPECIFIED: ICD-10-CM

## 2022-09-01 LAB
ALBUMIN SERPL ELPH-MCNC: 3.9 G/DL — SIGNIFICANT CHANGE UP (ref 3.3–5)
ALBUMIN SERPL ELPH-MCNC: 4 G/DL
ALBUMIN SERPL ELPH-MCNC: 4.2 G/DL — SIGNIFICANT CHANGE UP (ref 3.3–5)
ALP BLD-CCNC: 219 U/L
ALP SERPL-CCNC: 156 U/L — HIGH (ref 40–120)
ALP SERPL-CCNC: 161 U/L — HIGH (ref 40–120)
ALT FLD-CCNC: 12 U/L — SIGNIFICANT CHANGE UP (ref 10–45)
ALT FLD-CCNC: 21 U/L — SIGNIFICANT CHANGE UP (ref 10–45)
ALT SERPL-CCNC: 9 U/L
ANION GAP SERPL CALC-SCNC: 14 MMOL/L
ANION GAP SERPL CALC-SCNC: 14 MMOL/L — SIGNIFICANT CHANGE UP (ref 5–17)
ANION GAP SERPL CALC-SCNC: 16 MMOL/L — SIGNIFICANT CHANGE UP (ref 5–17)
ANION GAP SERPL CALC-SCNC: 16 MMOL/L — SIGNIFICANT CHANGE UP (ref 5–17)
APPEARANCE UR: CLEAR — SIGNIFICANT CHANGE UP
APTT BLD: 30.2 SEC — SIGNIFICANT CHANGE UP (ref 27.5–35.5)
APTT BLD: 40.2 SEC
APTT BLD: 41.8 SEC — HIGH (ref 27.5–35.5)
APTT BLD: 57.3 SEC — HIGH (ref 27.5–35.5)
AST SERPL-CCNC: 13 U/L — SIGNIFICANT CHANGE UP (ref 10–40)
AST SERPL-CCNC: 23 U/L — SIGNIFICANT CHANGE UP (ref 10–40)
AST SERPL-CCNC: 28 U/L
BILIRUB SERPL-MCNC: 0.7 MG/DL
BILIRUB SERPL-MCNC: 1.2 MG/DL — SIGNIFICANT CHANGE UP (ref 0.2–1.2)
BILIRUB SERPL-MCNC: 1.4 MG/DL — HIGH (ref 0.2–1.2)
BILIRUB UR-MCNC: NEGATIVE — SIGNIFICANT CHANGE UP
BLD GP AB SCN SERPL QL: NEGATIVE — SIGNIFICANT CHANGE UP
BUN SERPL-MCNC: 24 MG/DL
BUN SERPL-MCNC: 95 MG/DL — HIGH (ref 7–23)
BUN SERPL-MCNC: 98 MG/DL — HIGH (ref 7–23)
BUN SERPL-MCNC: 98 MG/DL — HIGH (ref 7–23)
CALCIUM SERPL-MCNC: 9.3 MG/DL — SIGNIFICANT CHANGE UP (ref 8.4–10.5)
CALCIUM SERPL-MCNC: 9.3 MG/DL — SIGNIFICANT CHANGE UP (ref 8.4–10.5)
CALCIUM SERPL-MCNC: 9.4 MG/DL
CALCIUM SERPL-MCNC: 9.5 MG/DL — SIGNIFICANT CHANGE UP (ref 8.4–10.5)
CHLORIDE SERPL-SCNC: 101 MMOL/L — SIGNIFICANT CHANGE UP (ref 96–108)
CHLORIDE SERPL-SCNC: 102 MMOL/L — SIGNIFICANT CHANGE UP (ref 96–108)
CHLORIDE SERPL-SCNC: 104 MMOL/L — SIGNIFICANT CHANGE UP (ref 96–108)
CHLORIDE SERPL-SCNC: 98 MMOL/L
CO2 SERPL-SCNC: 24 MMOL/L — SIGNIFICANT CHANGE UP (ref 22–31)
CO2 SERPL-SCNC: 25 MMOL/L — SIGNIFICANT CHANGE UP (ref 22–31)
CO2 SERPL-SCNC: 27 MMOL/L — SIGNIFICANT CHANGE UP (ref 22–31)
CO2 SERPL-SCNC: 30 MMOL/L
COLOR SPEC: SIGNIFICANT CHANGE UP
CREAT SERPL-MCNC: 1.58 MG/DL
CREAT SERPL-MCNC: 1.9 MG/DL — HIGH (ref 0.5–1.3)
CREAT SERPL-MCNC: 1.93 MG/DL — HIGH (ref 0.5–1.3)
CREAT SERPL-MCNC: 2.05 MG/DL — HIGH (ref 0.5–1.3)
DIFF PNL FLD: NEGATIVE — SIGNIFICANT CHANGE UP
EGFR: 34 ML/MIN/1.73M2 — LOW
EGFR: 37 ML/MIN/1.73M2 — LOW
EGFR: 37 ML/MIN/1.73M2 — LOW
EGFR: 46 ML/MIN/1.73M2
G6PD RBC-CCNC: 11.1 U/G HGB — SIGNIFICANT CHANGE UP (ref 7–20.5)
GLUCOSE BLDC GLUCOMTR-MCNC: 127 MG/DL — HIGH (ref 70–99)
GLUCOSE BLDC GLUCOMTR-MCNC: 199 MG/DL — HIGH (ref 70–99)
GLUCOSE BLDC GLUCOMTR-MCNC: 229 MG/DL — HIGH (ref 70–99)
GLUCOSE BLDC GLUCOMTR-MCNC: 234 MG/DL — HIGH (ref 70–99)
GLUCOSE SERPL-MCNC: 102 MG/DL
GLUCOSE SERPL-MCNC: 113 MG/DL — HIGH (ref 70–99)
GLUCOSE SERPL-MCNC: 196 MG/DL — HIGH (ref 70–99)
GLUCOSE SERPL-MCNC: 209 MG/DL — HIGH (ref 70–99)
GLUCOSE UR QL: NEGATIVE — SIGNIFICANT CHANGE UP
HCT VFR BLD CALC: 29.4 % — LOW (ref 39–50)
HCT VFR BLD CALC: 29.7 % — LOW (ref 39–50)
HGB BLD-MCNC: 9 G/DL — LOW (ref 13–17)
HGB BLD-MCNC: 9.2 G/DL — LOW (ref 13–17)
INR PPP: 2.54 RATIO
KETONES UR-MCNC: NEGATIVE — SIGNIFICANT CHANGE UP
LDH SERPL L TO P-CCNC: 370 U/L — HIGH (ref 50–242)
LDH SERPL L TO P-CCNC: 440 U/L — HIGH (ref 50–242)
LDH SERPL-CCNC: 480 U/L
LEUKOCYTE ESTERASE UR-ACNC: NEGATIVE — SIGNIFICANT CHANGE UP
MAGNESIUM SERPL-MCNC: 1.6 MG/DL
MAGNESIUM SERPL-MCNC: 1.8 MG/DL — SIGNIFICANT CHANGE UP (ref 1.6–2.6)
MAGNESIUM SERPL-MCNC: 1.9 MG/DL — SIGNIFICANT CHANGE UP (ref 1.6–2.6)
MAGNESIUM SERPL-MCNC: 2 MG/DL — SIGNIFICANT CHANGE UP (ref 1.6–2.6)
MCHC RBC-ENTMCNC: 26.3 PG — LOW (ref 27–34)
MCHC RBC-ENTMCNC: 26.4 PG — LOW (ref 27–34)
MCHC RBC-ENTMCNC: 30.6 GM/DL — LOW (ref 32–36)
MCHC RBC-ENTMCNC: 31 GM/DL — LOW (ref 32–36)
MCV RBC AUTO: 85.3 FL — SIGNIFICANT CHANGE UP (ref 80–100)
MCV RBC AUTO: 86 FL — SIGNIFICANT CHANGE UP (ref 80–100)
NITRITE UR-MCNC: NEGATIVE — SIGNIFICANT CHANGE UP
NRBC # BLD: 0 /100 WBCS — SIGNIFICANT CHANGE UP (ref 0–0)
NRBC # BLD: 0 /100 WBCS — SIGNIFICANT CHANGE UP (ref 0–0)
PH UR: 5.5 — SIGNIFICANT CHANGE UP (ref 5–8)
PHOSPHATE SERPL-MCNC: 3.5 MG/DL
PHOSPHATE SERPL-MCNC: 4.4 MG/DL — SIGNIFICANT CHANGE UP (ref 2.5–4.5)
PHOSPHATE SERPL-MCNC: 4.7 MG/DL — HIGH (ref 2.5–4.5)
PHOSPHATE SERPL-MCNC: 4.7 MG/DL — HIGH (ref 2.5–4.5)
PLATELET # BLD AUTO: 282 K/UL — SIGNIFICANT CHANGE UP (ref 150–400)
PLATELET # BLD AUTO: 311 K/UL — SIGNIFICANT CHANGE UP (ref 150–400)
POTASSIUM SERPL-MCNC: 3 MMOL/L — LOW (ref 3.5–5.3)
POTASSIUM SERPL-MCNC: 3.9 MMOL/L — SIGNIFICANT CHANGE UP (ref 3.5–5.3)
POTASSIUM SERPL-MCNC: 4 MMOL/L — SIGNIFICANT CHANGE UP (ref 3.5–5.3)
POTASSIUM SERPL-SCNC: 3 MMOL/L — LOW (ref 3.5–5.3)
POTASSIUM SERPL-SCNC: 3.9 MMOL/L — SIGNIFICANT CHANGE UP (ref 3.5–5.3)
POTASSIUM SERPL-SCNC: 4 MMOL/L — SIGNIFICANT CHANGE UP (ref 3.5–5.3)
POTASSIUM SERPL-SCNC: 4.4 MMOL/L
PROT SERPL-MCNC: 7.8 G/DL — SIGNIFICANT CHANGE UP (ref 6–8.3)
PROT SERPL-MCNC: 7.9 G/DL
PROT SERPL-MCNC: 8.2 G/DL — SIGNIFICANT CHANGE UP (ref 6–8.3)
PROT UR-MCNC: NEGATIVE — SIGNIFICANT CHANGE UP
PT BLD: 29.8 SEC
RBC # BLD: 3.42 M/UL — LOW (ref 4.2–5.8)
RBC # BLD: 3.48 M/UL — LOW (ref 4.2–5.8)
RBC # FLD: 19.1 % — HIGH (ref 10.3–14.5)
RBC # FLD: 19.1 % — HIGH (ref 10.3–14.5)
RH IG SCN BLD-IMP: POSITIVE — SIGNIFICANT CHANGE UP
SODIUM SERPL-SCNC: 142 MMOL/L
SODIUM SERPL-SCNC: 142 MMOL/L — SIGNIFICANT CHANGE UP (ref 135–145)
SODIUM SERPL-SCNC: 143 MMOL/L — SIGNIFICANT CHANGE UP (ref 135–145)
SODIUM SERPL-SCNC: 144 MMOL/L — SIGNIFICANT CHANGE UP (ref 135–145)
SP GR SPEC: 1.01 — SIGNIFICANT CHANGE UP (ref 1.01–1.02)
URATE SERPL-MCNC: 13.5 MG/DL
URATE SERPL-MCNC: 7.9 MG/DL — SIGNIFICANT CHANGE UP (ref 3.4–8.8)
URATE SERPL-MCNC: 7.9 MG/DL — SIGNIFICANT CHANGE UP (ref 3.4–8.8)
URATE SERPL-MCNC: 8 MG/DL — SIGNIFICANT CHANGE UP (ref 3.4–8.8)
UROBILINOGEN FLD QL: NEGATIVE — SIGNIFICANT CHANGE UP
WBC # BLD: 13.41 K/UL — HIGH (ref 3.8–10.5)
WBC # BLD: 16.34 K/UL — HIGH (ref 3.8–10.5)
WBC # FLD AUTO: 13.41 K/UL — HIGH (ref 3.8–10.5)
WBC # FLD AUTO: 16.34 K/UL — HIGH (ref 3.8–10.5)

## 2022-09-01 PROCEDURE — 99233 SBSQ HOSP IP/OBS HIGH 50: CPT | Mod: GC

## 2022-09-01 RX ORDER — ACETAMINOPHEN 500 MG
650 TABLET ORAL ONCE
Refills: 0 | Status: COMPLETED | OUTPATIENT
Start: 2022-09-01 | End: 2022-09-01

## 2022-09-01 RX ORDER — OBINUTUZUMAB 1000 MG/40ML
900 INJECTION, SOLUTION, CONCENTRATE INTRAVENOUS ONCE
Refills: 0 | Status: COMPLETED | OUTPATIENT
Start: 2022-09-02 | End: 2022-09-02

## 2022-09-01 RX ORDER — ONDANSETRON 8 MG/1
8 TABLET, FILM COATED ORAL ONCE
Refills: 0 | Status: COMPLETED | OUTPATIENT
Start: 2022-09-01 | End: 2022-09-01

## 2022-09-01 RX ORDER — DIPHENHYDRAMINE HCL 50 MG
50 CAPSULE ORAL ONCE
Refills: 0 | Status: COMPLETED | OUTPATIENT
Start: 2022-09-01 | End: 2022-09-01

## 2022-09-01 RX ORDER — DEXAMETHASONE 0.5 MG/5ML
20 ELIXIR ORAL ONCE
Refills: 0 | Status: COMPLETED | OUTPATIENT
Start: 2022-09-01 | End: 2022-09-01

## 2022-09-01 RX ORDER — CYCLOPHOSPHAMIDE 100 MG
776 VIAL (EA) INTRAVENOUS ONCE
Refills: 0 | Status: COMPLETED | OUTPATIENT
Start: 2022-09-01 | End: 2022-09-01

## 2022-09-01 RX ORDER — HYDRALAZINE HCL 50 MG
25 TABLET ORAL EVERY 8 HOURS
Refills: 0 | Status: DISCONTINUED | OUTPATIENT
Start: 2022-09-01 | End: 2022-09-01

## 2022-09-01 RX ORDER — POTASSIUM CHLORIDE 20 MEQ
20 PACKET (EA) ORAL
Refills: 0 | Status: COMPLETED | OUTPATIENT
Start: 2022-09-01 | End: 2022-09-01

## 2022-09-01 RX ORDER — OBINUTUZUMAB 1000 MG/40ML
900 INJECTION, SOLUTION, CONCENTRATE INTRAVENOUS ONCE
Refills: 0 | Status: DISCONTINUED | OUTPATIENT
Start: 2022-09-01 | End: 2022-09-01

## 2022-09-01 RX ORDER — ASPIRIN/CALCIUM CARB/MAGNESIUM 324 MG
81 TABLET ORAL DAILY
Refills: 0 | Status: DISCONTINUED | OUTPATIENT
Start: 2022-09-01 | End: 2022-09-02

## 2022-09-01 RX ORDER — ETOPOSIDE 20 MG/ML
58 VIAL (ML) INTRAVENOUS EVERY 24 HOURS
Refills: 0 | Status: COMPLETED | OUTPATIENT
Start: 2022-09-01 | End: 2022-09-03

## 2022-09-01 RX ORDER — HYDRALAZINE HCL 50 MG
25 TABLET ORAL EVERY 8 HOURS
Refills: 0 | Status: DISCONTINUED | OUTPATIENT
Start: 2022-09-01 | End: 2022-09-04

## 2022-09-01 RX ORDER — FOSAPREPITANT DIMEGLUMINE 150 MG/5ML
150 INJECTION, POWDER, LYOPHILIZED, FOR SOLUTION INTRAVENOUS ONCE
Refills: 0 | Status: COMPLETED | OUTPATIENT
Start: 2022-09-01 | End: 2022-09-01

## 2022-09-01 RX ORDER — VINCRISTINE SULFATE 1 MG/ML
2 VIAL (ML) INTRAVENOUS ONCE
Refills: 0 | Status: COMPLETED | OUTPATIENT
Start: 2022-09-01 | End: 2022-09-01

## 2022-09-01 RX ORDER — OBINUTUZUMAB 1000 MG/40ML
100 INJECTION, SOLUTION, CONCENTRATE INTRAVENOUS ONCE
Refills: 0 | Status: COMPLETED | OUTPATIENT
Start: 2022-09-01 | End: 2022-09-01

## 2022-09-01 RX ORDER — ATORVASTATIN CALCIUM 80 MG/1
80 TABLET, FILM COATED ORAL AT BEDTIME
Refills: 0 | Status: DISCONTINUED | OUTPATIENT
Start: 2022-09-01 | End: 2022-09-03

## 2022-09-01 RX ADMIN — CHLORHEXIDINE GLUCONATE 1 APPLICATION(S): 213 SOLUTION TOPICAL at 12:48

## 2022-09-01 RX ADMIN — Medication 20 MILLIEQUIVALENT(S): at 10:33

## 2022-09-01 RX ADMIN — Medication 2: at 12:04

## 2022-09-01 RX ADMIN — FOSAPREPITANT DIMEGLUMINE 300 MILLIGRAM(S): 150 INJECTION, POWDER, LYOPHILIZED, FOR SOLUTION INTRAVENOUS at 11:56

## 2022-09-01 RX ADMIN — Medication 110 MILLIGRAM(S): at 14:35

## 2022-09-01 RX ADMIN — HEPARIN SODIUM 1900 UNIT(S)/HR: 5000 INJECTION INTRAVENOUS; SUBCUTANEOUS at 19:48

## 2022-09-01 RX ADMIN — HEPARIN SODIUM 1900 UNIT(S)/HR: 5000 INJECTION INTRAVENOUS; SUBCUTANEOUS at 10:28

## 2022-09-01 RX ADMIN — BUMETANIDE 2 MILLIGRAM(S): 0.25 INJECTION INTRAMUSCULAR; INTRAVENOUS at 05:25

## 2022-09-01 RX ADMIN — Medication 12.5 MILLIGRAM(S): at 18:25

## 2022-09-01 RX ADMIN — Medication 650 MILLIGRAM(S): at 14:35

## 2022-09-01 RX ADMIN — Medication 12.5 MILLIGRAM(S): at 05:29

## 2022-09-01 RX ADMIN — Medication 2 MILLIGRAM(S): at 12:36

## 2022-09-01 RX ADMIN — HEPARIN SODIUM 1600 UNIT(S)/HR: 5000 INJECTION INTRAVENOUS; SUBCUTANEOUS at 01:47

## 2022-09-01 RX ADMIN — Medication 650 MILLIGRAM(S): at 14:43

## 2022-09-01 RX ADMIN — Medication 20 MILLIEQUIVALENT(S): at 12:48

## 2022-09-01 RX ADMIN — Medication 58 MILLIGRAM(S): at 13:45

## 2022-09-01 RX ADMIN — BUMETANIDE 2 MILLIGRAM(S): 0.25 INJECTION INTRAMUSCULAR; INTRAVENOUS at 14:37

## 2022-09-01 RX ADMIN — Medication 10 MILLIGRAM(S): at 14:36

## 2022-09-01 RX ADMIN — OBINUTUZUMAB 100 MILLIGRAM(S): 1000 INJECTION, SOLUTION, CONCENTRATE INTRAVENOUS at 15:27

## 2022-09-01 RX ADMIN — Medication 400 MILLIGRAM(S): at 18:26

## 2022-09-01 RX ADMIN — Medication 100 MILLIGRAM(S): at 12:04

## 2022-09-01 RX ADMIN — Medication 776 MILLIGRAM(S): at 12:45

## 2022-09-01 RX ADMIN — HEPARIN SODIUM 3000 UNIT(S): 5000 INJECTION INTRAVENOUS; SUBCUTANEOUS at 01:49

## 2022-09-01 RX ADMIN — ONDANSETRON 8 MILLIGRAM(S): 8 TABLET, FILM COATED ORAL at 11:55

## 2022-09-01 RX ADMIN — Medication 1: at 21:54

## 2022-09-01 RX ADMIN — HEPARIN SODIUM 6500 UNIT(S): 5000 INJECTION INTRAVENOUS; SUBCUTANEOUS at 10:33

## 2022-09-01 RX ADMIN — Medication 400 MILLIGRAM(S): at 05:29

## 2022-09-01 RX ADMIN — Medication 1 TABLET(S): at 12:04

## 2022-09-01 RX ADMIN — ATORVASTATIN CALCIUM 80 MILLIGRAM(S): 80 TABLET, FILM COATED ORAL at 21:58

## 2022-09-01 RX ADMIN — Medication 25 MILLIGRAM(S): at 21:58

## 2022-09-01 RX ADMIN — Medication 10 MILLIGRAM(S): at 05:29

## 2022-09-01 RX ADMIN — Medication 50 MILLIGRAM(S): at 14:35

## 2022-09-01 RX ADMIN — HEPARIN SODIUM 1600 UNIT(S)/HR: 5000 INJECTION INTRAVENOUS; SUBCUTANEOUS at 07:43

## 2022-09-01 RX ADMIN — Medication 100 MILLIGRAM(S): at 05:28

## 2022-09-01 NOTE — PROGRESS NOTE ADULT - ASSESSMENT
72 year old male with significant history of HTN, CKD stage III, complete heart block (PPM in place), HLD, HFrEF (45% in 2019), and DLBCL (tx with R-CHOP in 2003, with relapse in 2009 treated with BR) now with recently diagnosed grade 3 follicular lymphoma on 8/23 who presents with anemia and SOB, patient was pending a surgical biopsy on 8/25. However, pt was found to be in TLS with associated nausea and fatigue on 8/23; at this time rasburicase was started and lowered the uric acid from 13 to 6, however, pt developed rasburicase-triggered G6PD hemolytic anemia with associated jaundice and dark colored urine as well as hemoglobin of 5.7 and hypotension on 8/26 requiring further evaluation. During hosp pt had CHF as well. started on iv bumex.  had echo revealing e 20%.     1- CKD III   2- CHF   3- Tumor lysis   4- HTN   5- hypokalemia    creatinine steady   bumex 2mg iv bid   given LDH seems to have been steady range as well as haptoglobin in normal range; suspect he is at baseline hemolysis from his underlying disease   hypokalemia, kcl supplemented  trend k+  cont hydralazine 10 mg tid   allopurinol 100 mg daily   strict I/O  keep O> I   trend creatinine and electrolytes daily 72 year old male with significant history of HTN, CKD stage III, complete heart block (PPM in place), HLD, HFrEF (45% in 2019), and DLBCL (tx with R-CHOP in 2003, with relapse in 2009 treated with BR) now with recently diagnosed grade 3 follicular lymphoma on 8/23 who presents with anemia and SOB, patient was pending a surgical biopsy on 8/25. However, pt was found to be in TLS with associated nausea and fatigue on 8/23; at this time rasburicase was started and lowered the uric acid from 13 to 6, however, pt developed rasburicase-triggered G6PD hemolytic anemia with associated jaundice and dark colored urine as well as hemoglobin of 5.7 and hypotension on 8/26 requiring further evaluation. During hosp pt had CHF as well. started on iv bumex.  had echo revealing e 20%.     1- CKD III   2- CHF   3- Tumor lysis   4- HTN   5- hypokalemia  6- chf     creatinine steady   bumex 2mg iv bid   given LDH seems to have been steady range as well as haptoglobin in normal range; suspect he is at baseline hemolysis from his underlying disease   hypokalemia, kcl supplemented  trend k+  cont hydralazine 10 mg tid   allopurinol 100 mg daily   strict I/O  keep O> I   trend creatinine and electrolytes daily

## 2022-09-01 NOTE — CONSULT NOTE ADULT - CONSULT REQUESTED DATE/TIME
27-Aug-2022
28-Aug-2022 12:09
27-Aug-2022 07:43
26-Aug-2022 21:11
01-Sep-2022 14:32
29-Aug-2022 14:35
31-Aug-2022 08:47

## 2022-09-01 NOTE — PROGRESS NOTE ADULT - SUBJECTIVE AND OBJECTIVE BOX
[facility]   [phone]  RENAL FOLLOW UP NOTE  --------------------------------------------------------------------------------  Chief Complaint:    24 hour events/subjective:    patient seen and examined.   denies sob    PAST HISTORY  --------------------------------------------------------------------------------  No significant changes to PMH, PSH, FHx, SHx, unless otherwise noted    ALLERGIES & MEDICATIONS  --------------------------------------------------------------------------------  Allergies    No Known Allergies    Intolerances      Standing Inpatient Medications  acyclovir   Oral Tab/Cap 400 milliGRAM(s) Oral two times a day  allopurinol 100 milliGRAM(s) Oral daily  aspirin enteric coated 81 milliGRAM(s) Oral daily  atorvastatin 80 milliGRAM(s) Oral at bedtime  buMETAnide Injectable 2 milliGRAM(s) IV Push two times a day  chlorhexidine 2% Cloths 1 Application(s) Topical daily  chlorhexidine 4% Liquid 1 Application(s) Topical <User Schedule>  dextrose 5%. 1000 milliLiter(s) IV Continuous <Continuous>  dextrose 5%. 1000 milliLiter(s) IV Continuous <Continuous>  dextrose 50% Injectable 25 Gram(s) IV Push once  dextrose 50% Injectable 12.5 Gram(s) IV Push once  dextrose 50% Injectable 25 Gram(s) IV Push once  etoposide (TOPOSAR) IVPB (eMAR) 58 milliGRAM(s) IV Intermittent every 24 hours  glucagon  Injectable 1 milliGRAM(s) IntraMuscular once  heparin  Infusion.  Unit(s)/Hr IV Continuous <Continuous>  hydrALAZINE 25 milliGRAM(s) Oral every 8 hours  insulin lispro (ADMELOG) corrective regimen sliding scale   SubCutaneous Before meals and at bedtime  metoprolol tartrate 12.5 milliGRAM(s) Oral every 12 hours  multivitamin 1 Tablet(s) Oral daily  predniSONE   Tablet 100 milliGRAM(s) Oral daily    PRN Inpatient Medications  dextrose Oral Gel 15 Gram(s) Oral once PRN  heparin   Injectable 6500 Unit(s) IV Push every 6 hours PRN  heparin   Injectable 3000 Unit(s) IV Push every 6 hours PRN  melatonin 3 milliGRAM(s) Oral at bedtime PRN  sodium chloride 0.9% lock flush 10 milliLiter(s) IV Push every 1 hour PRN      REVIEW OF SYSTEMS  --------------------------------------------------------------------------------    Gen: denies fevers/chills,  CVS: denies chest pain/palpitations  Resp: denies SOB/Cough  GI: Denies N/V/Abd pain  : Denies dysuria/oliguria/hematuria    VITALS/PHYSICAL EXAM  --------------------------------------------------------------------------------  T(C): 36.4 (09-01-22 @ 15:49), Max: 36.6 (08-31-22 @ 20:01)  HR: 81 (09-01-22 @ 15:49) (76 - 83)  BP: 119/71 (09-01-22 @ 15:49) (110/63 - 127/75)  RR: 18 (09-01-22 @ 15:49) (18 - 18)  SpO2: 96% (09-01-22 @ 15:49) (95% - 99%)  Wt(kg): --        08-31-22 @ 07:01  -  09-01-22 @ 07:00  --------------------------------------------------------  IN: 566 mL / OUT: 2100 mL / NET: -1534 mL    09-01-22 @ 07:01  -  09-01-22 @ 18:10  --------------------------------------------------------  IN: 1319.7 mL / OUT: 1100 mL / NET: 219.7 mL      Physical Exam:  	              Gen: thin pale appearing   	Pulm: decrease bs  no rales or ronchi or wheezing  	CV: Mild JVD. RRR, S1S2; no rub  	Abd: +BS, soft, nontender/nondistended  	: No suprapubic tenderness  	UE: Warm, no cyanosis  no clubbing,  no edema  	LE: Warm, no cyanosis  no clubbing, no edema  	Neuro: alert and oriented. speech coherent     LABS/STUDIES  --------------------------------------------------------------------------------              9.2    13.41 >-----------<  282      [09-01-22 @ 06:25]              29.7     144  |  104  |  95  ----------------------------<  113      [09-01-22 @ 06:25]  3.0   |  24  |  2.05        Ca     9.5     [09-01-22 @ 06:25]      Mg     2.0     [09-01-22 @ 06:25]      Phos  4.4     [09-01-22 @ 06:25]    TPro  7.8  /  Alb  3.9  /  TBili  1.2  /  DBili  x   /  AST  13  /  ALT  12  /  AlkPhos  156  [09-01-22 @ 06:25]      PTT: 30.2       [09-01-22 @ 09:02]    Uric acid 8.0      [09-01-22 @ 06:25]        [08-31-22 @ 11:26]    Creatinine Trend:  SCr 2.05 [09-01 @ 06:25]  SCr 2.04 [08-31 @ 06:59]  SCr 1.78 [08-30 @ 11:15]  SCr 1.75 [08-29 @ 12:40]  SCr 1.79 [08-29 @ 05:52]              Urinalysis - [09-01-22 @ 12:01]      Color Light Yellow / Appearance Clear / SG 1.011 / pH 5.5      Gluc Negative / Ketone Negative  / Bili Negative / Urobili Negative       Blood Negative / Protein Negative / Leuk Est Negative / Nitrite Negative      RBC  / WBC  / Hyaline  / Gran  / Sq Epi  / Non Sq Epi  / Bacteria       HbA1c 6.0      [09-26-19 @ 08:44]

## 2022-09-01 NOTE — PROGRESS NOTE ADULT - PROBLEM SELECTOR PLAN 1
-Would c/w bumex goal net negative 500 ccs -1 1L   -increase Hydralazine 20 mg q8h (would discuss with neuro given AMS and stroke findings)   -C/w metoprolol tartrate 12.5 BID  -Please repeat tte with [facility] to asses LVEF, mitral valve function, and r/o LV thrombus

## 2022-09-01 NOTE — PROGRESS NOTE ADULT - PROBLEM SELECTOR PLAN 7
Presenting with respiratory distress possibly 2/2 acute hemolytic anemia and HF exacerbation vs. ACS r/o (pt with known chronic right sided loculated effusion). Started on BiPAP at 11 ipap/5 epap  - now off BiPAP on room air saturating well, OOB  - diuresis as above

## 2022-09-01 NOTE — PROGRESS NOTE ADULT - PROBLEM SELECTOR PLAN 5
History of HFrEF EF 45%, diffuse LV hypokinesis, mod pulmHTN; followed by cardiology outpatient, elevated proBNP with uptrending troponins, now downtrending. TTE (8/27) ef=20%, severe MR, mild LV enlargement, normal RA, RV enlargement with decreased RVSF, mild-mod tricuspid regurg, moderate pulm pressures, b/l pleural effusions   - c/w metoprolol tartrate 12.5mg BID (for discharge consider metoprolol succinate vs. resuming home Carvedilol at lower dose as per Cards)  - c/w hYDRALAZINE 10MG q8h for afterload reduction  - c/w bumex 2mg BID  - consider adding entresto (in place of home lisinopril) and SGLT2-inhibitor if renal function tolerates

## 2022-09-01 NOTE — CONSULT NOTE ADULT - SUBJECTIVE AND OBJECTIVE BOX
Neurology  Consult Note  09-01-22    Name:  NIK GRIMM; 72y (1950)    Reason for Admission: Tumor lysis syndrome        Chief Complaint:  HPI:  72 year old male with PMHx HTN, CKD stage II, complete heart block (PPM in place), HLD, HFrEF (45% in 2019), and DLBCL (tx with R-CHOP in 2003, with relapse in 2009 treated with BR) now with recently diagnosed grade 3 follicular lymphoma on 8/23 who presents with anemia and SOB, patient was pending a surgical biopsy on 8/25. Admitted for TLS and rasburicase-triggered G6PD hemolytic anemia. Given that patient has developed confusion this admission and is now AOx1, primary team obtained a CTH, which shows acute/subacute R parietal infarction (larger in size when compared with 5/2019), chronic lacunar infarct in R basal ganglia, mild chronic white matter microvascular disease, no hemorrhagic transformation. Denies headache, weakness, numbness, dizziness, visual disturbances. NIHSS 2-did not know month and age.     Care Providers:    PMD: Dr Amari Hickman  Endo: Dr Carter Vigil  Cardiologist: Dr. Don [phone] fax [phone]    =======================================================    Vonnie (daughter): [phone] - takes all healthcare related calls.    (27 Aug 2022 03:15)    Review of Systems:  As states in HPI.        PMHx:   Non-Hodgkins Lymphoma    Diabetes Mellitus    DM type 2 (diabetes mellitus, type 2)    Essential hypertension    Rhinitis, allergic    Systolic heart failure    Moderate mitral regurgitation    Pleural effusion    Atrial flutter, unspecified type    Impaired memory      PFHx:   Family history of CHF (congestive heart failure)    Family history of non-Hodgkin&#x27;s lymphoma (Sibling)      PSuHx:   No significant past surgical history    Non-Hodgkin lymphoma    Cardiac pacemaker    Medications:  MEDICATIONS  (STANDING):  acetaminophen     Tablet .. 650 milliGRAM(s) Oral once  acyclovir   Oral Tab/Cap 400 milliGRAM(s) Oral two times a day  allopurinol 100 milliGRAM(s) Oral daily  atorvastatin 40 milliGRAM(s) Oral at bedtime  buMETAnide Injectable 2 milliGRAM(s) IV Push two times a day  chlorhexidine 2% Cloths 1 Application(s) Topical daily  chlorhexidine 4% Liquid 1 Application(s) Topical <User Schedule>  dexAMETHasone  IVPB 20 milliGRAM(s) IV Intermittent once  dextrose 5%. 1000 milliLiter(s) (100 mL/Hr) IV Continuous <Continuous>  dextrose 5%. 1000 milliLiter(s) (50 mL/Hr) IV Continuous <Continuous>  dextrose 50% Injectable 25 Gram(s) IV Push once  dextrose 50% Injectable 12.5 Gram(s) IV Push once  dextrose 50% Injectable 25 Gram(s) IV Push once  diphenhydrAMINE 50 milliGRAM(s) Oral once  etoposide (TOPOSAR) IVPB (eMAR) 58 milliGRAM(s) IV Intermittent every 24 hours  glucagon  Injectable 1 milliGRAM(s) IntraMuscular once  heparin  Infusion.  Unit(s)/Hr (14 mL/Hr) IV Continuous <Continuous>  hydrALAZINE 10 milliGRAM(s) Oral every 8 hours  insulin lispro (ADMELOG) corrective regimen sliding scale   SubCutaneous Before meals and at bedtime  metoprolol tartrate 12.5 milliGRAM(s) Oral every 12 hours  multivitamin 1 Tablet(s) Oral daily  obinutuzumab IVPB (eMAR) 100 milliGRAM(s) IV Intermittent once  predniSONE   Tablet 100 milliGRAM(s) Oral daily    MEDICATIONS  (PRN):  dextrose Oral Gel 15 Gram(s) Oral once PRN Blood Glucose LESS THAN 70 milliGRAM(s)/deciliter  heparin   Injectable 6500 Unit(s) IV Push every 6 hours PRN For aPTT less than 40  heparin   Injectable 3000 Unit(s) IV Push every 6 hours PRN For aPTT between 40 - 57  melatonin 3 milliGRAM(s) Oral at bedtime PRN Insomnia  sodium chloride 0.9% lock flush 10 milliLiter(s) IV Push every 1 hour PRN Pre/post blood products, medications, blood draw, and to maintain line patency    Vitals:  T(C): 36.4 (09-01-22 @ 11:46), Max: 36.6 (08-31-22 @ 20:01)  HR: 76 (09-01-22 @ 14:29) (76 - 91)  BP: 110/63 (09-01-22 @ 14:29) (110/63 - 127/75)  RR: 18 (09-01-22 @ 11:46) (18 - 18)  SpO2: 99% (09-01-22 @ 11:46) (95% - 99%)    Labs:                        9.2    13.41 )-----------( 282      ( 01 Sep 2022 06:25 )             29.7     09-01    144  |  104  |  95<H>  ----------------------------<  113<H>  3.0<L>   |  24  |  2.05<H>    Ca    9.5      01 Sep 2022 06:25  Phos  4.4     09-01  Mg     2.0     09-01    TPro  7.8  /  Alb  3.9  /  TBili  1.2  /  DBili  x   /  AST  13  /  ALT  12  /  AlkPhos  156<H>  09-01    CAPILLARY BLOOD GLUCOSE      POCT Blood Glucose.: 229 mg/dL (01 Sep 2022 11:45)    LIVER FUNCTIONS - ( 01 Sep 2022 06:25 )  Alb: 3.9 g/dL / Pro: 7.8 g/dL / ALK PHOS: 156 U/L / ALT: 12 U/L / AST: 13 U/L / GGT: x             PTT - ( 01 Sep 2022 09:02 )  PTT:30.2 sec    PHYSICAL EXAMINATION:  General: Well-developed, well nourished, in no acute distress.  Eyes: Conjunctiva and sclera clear.  Neurologic:  - Mental Status:  Alert, awake, oriented to person, not place, and time; Speech is fluent with intact naming.   - Cranial Nerves II-XII:    II:  Visual fields are full to confrontation; Pupils are equal, round, and reactive to light  III, IV, VI:  Extraocular movements are intact without nystagmus.  V:  Facial sensation is intact in the V1-V3 distribution bilaterally.  VII:  Face is symmetric with normal eye closure and smile  VIII:  Hearing is intact to finger rub.  IX, X:  Uvula is midline and soft palate rises symmetrically  XI:  Head turning and shoulder shrug are intact.  XII:  Tongue protudes in the midline.  - Motor:  Strength is 5/5 throughout.  There is no pronator drift.  Normal muscle bulk and tone throughout.  - Sensory:  Intact throughout to light touch.  - Coordination:  Finger-nose-finger and heel-knee-shin intact without dysmetria, but b/l intention tremor.   - Gait:   Due to fall risk, did not assess.     Radiology:  < from: CT Head No Cont (08.31.22 @ 20:48) >  Acute/subacute right-sided parietal lobe infarction   superimposed upon a chronic infarct. Findings appear larger in size when   compared with 5/7/2019.    No hemorrhagic transformation at this time.    Similar-appearing mild chronic white matter microvascular type changes   and chronic lacunar infarct within the right basal ganglia.    < end of copied text >

## 2022-09-01 NOTE — PROGRESS NOTE ADULT - SUBJECTIVE AND OBJECTIVE BOX
Interval History:  AMS yesterday, improved today. Findings of acute vs. subacute stroke on CT.     Medications:  acetaminophen     Tablet .. 650 milliGRAM(s) Oral once  acyclovir   Oral Tab/Cap 400 milliGRAM(s) Oral two times a day  allopurinol 100 milliGRAM(s) Oral daily  atorvastatin 40 milliGRAM(s) Oral at bedtime  buMETAnide Injectable 2 milliGRAM(s) IV Push two times a day  chlorhexidine 2% Cloths 1 Application(s) Topical daily  chlorhexidine 4% Liquid 1 Application(s) Topical <User Schedule>  cyclophosphamide IVPB (eMAR) 776 milliGRAM(s) IV Intermittent once  dexAMETHasone  IVPB 20 milliGRAM(s) IV Intermittent once  dextrose 5%. 1000 milliLiter(s) IV Continuous <Continuous>  dextrose 5%. 1000 milliLiter(s) IV Continuous <Continuous>  dextrose 50% Injectable 25 Gram(s) IV Push once  dextrose 50% Injectable 12.5 Gram(s) IV Push once  dextrose 50% Injectable 25 Gram(s) IV Push once  dextrose Oral Gel 15 Gram(s) Oral once PRN  diphenhydrAMINE 50 milliGRAM(s) Oral once  etoposide (TOPOSAR) IVPB (eMAR) 58 milliGRAM(s) IV Intermittent every 24 hours  glucagon  Injectable 1 milliGRAM(s) IntraMuscular once  heparin   Injectable 6500 Unit(s) IV Push every 6 hours PRN  heparin   Injectable 3000 Unit(s) IV Push every 6 hours PRN  heparin  Infusion.  Unit(s)/Hr IV Continuous <Continuous>  hydrALAZINE 10 milliGRAM(s) Oral every 8 hours  insulin lispro (ADMELOG) corrective regimen sliding scale   SubCutaneous Before meals and at bedtime  melatonin 3 milliGRAM(s) Oral at bedtime PRN  metoprolol tartrate 12.5 milliGRAM(s) Oral every 12 hours  multivitamin 1 Tablet(s) Oral daily  obinutuzumab IVPB (eMAR) 100 milliGRAM(s) IV Intermittent once  potassium chloride    Tablet ER 20 milliEquivalent(s) Oral every 2 hours  predniSONE   Tablet 100 milliGRAM(s) Oral daily  sodium chloride 0.9% lock flush 10 milliLiter(s) IV Push every 1 hour PRN  vinCRIStine IVPB (eMAR) 2 milliGRAM(s) IV Intermittent once      Vitals:  T(C): 36.4 (22 @ 11:46), Max: 36.6 (22 @ 20:01)  HR: 83 (22 @ 11:46) (79 - 91)  BP: 118/71 (22 @ 11:46) (115/61 - 127/75)  BP(mean): --  RR: 18 (22 @ 11:46) (18 - 18)  SpO2: 99% (22 @ 11:46) (95% - 99%)    Daily     Daily Weight in k.3 (01 Sep 2022 08:48)        I&O's Summary    31 Aug 2022 07:  -  01 Sep 2022 07:00  --------------------------------------------------------  IN: 566 mL / OUT: 2100 mL / NET: -1534 mL    01 Sep 2022 07:01  -  01 Sep 2022 12:34  --------------------------------------------------------  IN: 266 mL / OUT: 0 mL / NET: 266 mL        Physical Exam:  Appearance: No Acute Distress  HEENT: PERRL  Neck: elevated JVD   Cardiovascular: RRR  Respiratory: crackles +, improved   Gastrointestinal: Soft, Non-tender	  Skin: No cyanosis	  Neurologic: Non-focal  Extremities: edema improved from day prior    Psychiatry: A & O x 2, Mood & affect appropriate    Labs:                        9.2    13.41 )-----------( 282      ( 01 Sep 2022 06:25 )             29.7         144  |  104  |  95<H>  ----------------------------<  113<H>  3.0<L>   |  24  |  2.05<H>    Ca    9.5      01 Sep 2022 06:25  Phos  4.4       Mg     2.0         TPro  7.8  /  Alb  3.9  /  TBili  1.2  /  DBili  x   /  AST  13  /  ALT  12  /  AlkPhos  156<H>      PTT - ( 01 Sep 2022 09:02 )  PTT:30.2 sec        TELEMETRY:  v-paced 80s-100s

## 2022-09-01 NOTE — PROGRESS NOTE ADULT - ATTENDING COMMENTS
Improved mental status but still unable to accurately say year. Had NCHCT which showed acute R parietal infarct. On exam, oriented to name and place, JVP elevated, grade III/VI sysotlic murmur in axilla, CTAB, nontender abdomen, trace edema b/l. Labs reviewed - WBC 13,  (downtrending), BUN/Cr 99/2.04 (stably elevated), Tbili 1.2 (improved from 5 on admission), BNP 68k. Overall stage C HF, NYHA class III/IV with severe MR and mod TR with EF 15-20% with aggressive lymphoma.   - c/w bumex 2 mg q12 IV  - neuro c/s  - increase hydral to 25 mg q8h if neurology agreeable  - defer pharmacologic stress test given stroke; obtain neuro clearance   - prognosis guarded

## 2022-09-01 NOTE — PROGRESS NOTE ADULT - PROBLEM SELECTOR PLAN 10
- afib w/ CHB s/p Micra pacemaker then s/p MDT CRT-P upgrade 9/30/19  - v paced on tele  - EP consulted for ICD interrogation 8/29/22 - wnl

## 2022-09-01 NOTE — PROGRESS NOTE ADULT - PROBLEM SELECTOR PLAN 1
Has been confused since admission with improvement today (9/1). As per family, this occurs every time the patient is sick with similar symptoms. Considerations include underlying dementia, delirium, vs metabolic causes vs neurological vs malignancy  - AAOx1  - cultures with no growth to date, no acute toxicologies found on labs, no history of cirrhosis with low suspicion for ammonia induced, no indication for renal failure for uremic causes  - CT head non-contrast performed, no official read but as per radiology has acute on chronic of R parietal lobe infarct with no indication of hemorrhagic transformation

## 2022-09-01 NOTE — PROGRESS NOTE ADULT - PROBLEM SELECTOR PLAN 3
s/p rasburicase  - uric acid 13-->6, potassium stable   - was on allopurinol 300mg but now on 100mg as per heme/onc

## 2022-09-01 NOTE — ADVANCED PRACTICE NURSE CONSULT - ASSESSMENT
Patient received in bed, alert and oriented x 2, capable of expressing all needs to staff. Cycle 1, day 1/3,Height and weight verified. Consent in chart. Lab results as per Md carlos aware of same. Vital signs stable prior to chemotherapy and within acceptable parameters, see sunrise. Pt educated on the importance of saving urine, verbalizes good understanding. Pt education done re chemo regimen, drug effects and potential side effects, written materials provided, pt states understanding. Pt with right DL PICC line, site free from signs and symptoms of infection, good blood return obtained. Pre- medicated with Emend 150mg; Zofran 8mg IV. Vincristine = 2mg IV infused over 5 min via locked pump, completed at 1236. Blood return checked pre and post infusion.  Cyclophosphamide 400mg/m2 = 776mg IV infused over 1 hour via locked pump, completed at 1350. Etoposide 30mg/m2 = 58mg IV infused over 1 hour via locked pump, completed at 1455. MD made aware pt tolerated first half of treatment, ok to given test dose of Obinutuzumab. Premedicated with Tylenol 650mg PO; Benadryl 50mg PO; Decadron 20mg IV 1 hour prior to infusion. Obinutuzumab = 100mg IV infused over 4 hours via locked pump per protocol. Pt tolerated well. No adverse reaction noted, completed at  Patient received in bed, alert and oriented x 2, capable of expressing all needs to staff. Cycle 1, day 1/3,Height and weight verified. Consent in chart. Lab results as per Md carlos aware of same. Vital signs stable prior to chemotherapy and within acceptable parameters, see sunrise. Pt educated on the importance of saving urine, verbalizes good understanding. Pt education done re chemo regimen, drug effects and potential side effects, written materials provided, pt states understanding. Pt with right DL PICC line, site free from signs and symptoms of infection, good blood return obtained. Pre- medicated with Emend 150mg; Zofran 8mg IV. Vincristine = 2mg IV infused over 5 min via locked pump, completed at 1236. Blood return checked pre and post infusion.  Cyclophosphamide 400mg/m2 = 776mg IV infused over 1 hour via locked pump, completed at 1350. Etoposide 30mg/m2 = 58mg IV infused over 1 hour via locked pump, completed at 1455. MD made aware pt tolerated first half of treatment, ok to given test dose of Obinutuzumab. Premedicated with Tylenol 650mg PO; Benadryl 50mg PO; Decadron 20mg IV 1 hour prior to infusion. Obinutuzumab = 100mg IV infused over 4 hours via locked pump per protocol. Pt tolerated well. No adverse reaction noted, completed at 1930. Report given to area nurse.

## 2022-09-01 NOTE — PROGRESS NOTE ADULT - ASSESSMENT
72M hx DLBCL (tx with R-CHOP in 2003, with relapse in 2009 treated with BR) now with multiple areas of palpable lymphadenopathy with biopsies shown to be at least grade IIIA follicular lymphoma. Has had significant weight loss >20lbs in the last 6 months. Also noted to have an IgG lambda, IgG Kappa, and IgM Lambda monoclonal gammopathies. Prior to being sent to the ED his labs were suggestive of TLS with UA 13.7 s/p 3mg rasburicase on 8/23.      #Follicular Lymphoma  #Hemolytic Anemia  -Follows with Dr. Cordova at [facility].   -Originally diagnosed in 2003 s/p RCHOP with relapse in 2009 s/p BR. Recent outpatient PET/CT 8/2022 showed concern for POD with new LAD and subcutaneous tissue nodules  ---s/p FNA L cervical LN in June 2022 with path c/w grade 3A follicular lymphoma positive for BCL6 (3q27) breakpoint translocation and negative for MYC Rearrangement or BCL2-IGH gene rearrangement [translocation t(14;18) present in ~ 85% of FL].   ---Planned for excisional biopsy outpatient to confirm suspected 3B FL vs. transformation, but unable to be completed due to current admission. Patient presented with spontaneous TLS, now requiring inpatient treatment. Ideally, we would require an excisional biopsy to confirm the suspected diagnosis. However, patient is not optimized medically for an excisional biopsy inpatient, so we will treat for a presumptive diagnosis of relapsed FL with regimen below so as not to delay treatment:  -We will plan to initiate treatment with a modified regimen of mini-COEP plus Obinutuzumab on 9/1/22:  ------Cyclophosphamide 400 mg/m² on D1  ------Etoposide 40% dose reduced to 30 mg/m2 IV on D1-D3 of each cycle  ------Vincristine 2mg (flat dose) on D1  ------Prednisone 100mg PO daily x5 days (started on 8/29-9/2)  ------Obinutuzumab 100mg test dose on D1 + 900mg full dose on D2  -Excisional biopsy should be completed as soon as possible once patient is medically cleared, but as above, we will still plan to proceed with treatment.    #G6PD Deficiency  #Concern for TLS  -Patient is G6PD Deficient (G6PD 4.8) and is very high risk for TLS in the s/o spontaneous TLS on admission  -He is s/p rasburicase on 8/23 outpatient with development of hemolytic anemia and is now s/p 4u pRBC  -As we are initiating treatment today, we are anticipating patient may develop TLS and we will have to give Rasburicase  -Please check TLS labs BID starting 6pm on 9/1. Please ensure to check CMP, Mg, Phos, LDH, Uric acid.  ---If Uric Acid is >8, we will have to give Rasburicase. Please contact the Hematology fellow on call prior to administration of Rasburicase. Please call the Hematology fellow on call if there are any concerning findings on the TLS labs  ---If rasburicase is given, please transfuse 1u pRBC and check CBC at least BID to trend Hgb. Transfuse as needed to keep Hgb >7    Brittaney Ambrosio MD  Hematology/Oncology Fellow, PGY-6  Pager: [phone]  After 5pm and on weekends please page on-call fellow   72M hx DLBCL (tx with R-CHOP in 2003, with relapse in 2009 treated with BR) now with multiple areas of palpable lymphadenopathy with biopsies shown to be at least grade IIIA follicular lymphoma. Has had significant weight loss >20lbs in the last 6 months. Also noted to have an IgG lambda, IgG Kappa, and IgM Lambda monoclonal gammopathies. Prior to being sent to the ED his labs were suggestive of TLS with UA 13.7 s/p 3mg rasburicase on 8/23.      #Follicular Lymphoma  #Hemolytic Anemia  -Follows with Dr. Cordova at [facility].   -Originally diagnosed in 2003 s/p RCHOP with relapse in 2009 s/p BR. Recent outpatient PET/CT 8/2022 showed concern for POD with new LAD and subcutaneous tissue nodules  ---s/p FNA L cervical LN in June 2022 with path c/w grade 3A follicular lymphoma positive for BCL6 (3q27) breakpoint translocation and negative for MYC Rearrangement or BCL2-IGH gene rearrangement [translocation t(14;18) present in ~ 85% of FL].   ---Planned for excisional biopsy outpatient to confirm suspected 3B FL vs. transformation, but unable to be completed due to current admission. Patient presented with spontaneous TLS, now requiring inpatient treatment. Ideally, we would require an excisional biopsy to confirm the suspected diagnosis. However, patient is not optimized medically for an excisional biopsy inpatient, so we will treat for a presumptive diagnosis of relapsed FL with regimen below so as not to delay treatment:  -We will plan to initiate treatment with a modified regimen of mini-COEP plus Obinutuzumab on 9/1/22:  ------Cyclophosphamide 400 mg/m² on D1  ------Etoposide 40% dose reduced to 30 mg/m2 IV on D1-D3 of each cycle  ------Vincristine 2mg (flat dose) on D1  ------Prednisone 100mg PO daily x5 days (started on 8/29-9/2)  ------Obinutuzumab 100mg test dose on D1 + 900mg full dose on D2  -Excisional biopsy should be completed as soon as possible once patient is medically cleared, but as above, we will still plan to proceed with treatment.    #G6PD Deficiency  #Concern for TLS  -Patient is G6PD Deficient (G6PD 4.8) and is very high risk for TLS in the s/o spontaneous TLS on admission. He may require further doses of Rasburicase which can cause hemolytic anemia in the setting of G6PD Deficiency  -He is s/p rasburicase on 8/23 outpatient with development of hemolytic anemia and is now s/p 4u pRBC on this admission  -As we are initiating treatment today, we are anticipating patient may develop TLS and we will have to give Rasburicase  -Please check TLS labs BID starting 6pm on 9/1. Please ensure to check CMP, Mg, Phos, LDH, Uric acid.  ---If Uric Acid is >8, we will have to give Rasburicase. Please contact the Hematology fellow on call prior to administration of Rasburicase. Please call the Hematology fellow on call if there are any concerning findings on the TLS labs  ---If rasburicase is given, please transfuse 1u pRBC and check CBC at least BID to trend Hgb. Transfuse as needed to keep Hgb >7    Brittaney Ambrosio MD  Hematology/Oncology Fellow, PGY-6  Pager: [phone]  After 5pm and on weekends please page on-call fellow   72M hx DLBCL (tx with R-CHOP in 2003, with relapse in 2009 treated with BR) now with multiple areas of palpable lymphadenopathy with biopsies shown to be at least grade IIIA follicular lymphoma. Has had significant weight loss >20lbs in the last 6 months. Also noted to have an IgG lambda, IgG Kappa, and IgM Lambda monoclonal gammopathies. Prior to being sent to the ED his labs were suggestive of TLS with UA 13.7 s/p 3mg rasburicase on 8/23.      #Follicular Lymphoma  #Hemolytic Anemia  -Follows with Dr. Cordova at [facility].   -Originally diagnosed in 2003 s/p RCHOP with relapse in 2009 s/p BR. Recent outpatient PET/CT 8/2022 showed concern for POD with new LAD and subcutaneous tissue nodules  ---s/p FNA L cervical LN in June 2022 with path c/w grade 3A follicular lymphoma positive for BCL6 (3q27) breakpoint translocation and negative for MYC Rearrangement or BCL2-IGH gene rearrangement [translocation t(14;18) present in ~ 85% of FL].   ---Planned for excisional biopsy outpatient to confirm suspected 3B FL vs. transformation, but unable to be completed due to current admission. Patient presented with spontaneous TLS, now requiring inpatient treatment. Ideally, we would require an excisional biopsy to confirm the suspected diagnosis. However, patient is not optimized medically for an excisional biopsy inpatient, so we will treat for a presumptive diagnosis of relapsed FL with regimen below so as not to delay treatment:  -We will plan to initiate treatment with a modified regimen of mini-COEP plus Obinutuzumab on 9/1/22:  ------Cyclophosphamide 400 mg/m² on D1  ------Etoposide 40% dose reduced to 30 mg/m2 IV on D1-D3 of each cycle  ------Vincristine 2mg (flat dose) on D1  ------Prednisone 100mg PO daily x5 days (started on 8/29-9/2)  ------Obinutuzumab 100mg test dose on D1 + 900mg full dose on D2  -Excisional biopsy should be completed as soon as possible once patient is medically cleared, but as above, we will still plan to proceed with treatment.    #G6PD Deficiency  #Concern for TLS  -Patient is G6PD Deficient (G6PD 4.8) and is very high risk for TLS in the s/o spontaneous TLS on admission. He may require further doses of Rasburicase which can cause hemolytic anemia in the setting of G6PD Deficiency  -He is s/p rasburicase on 8/23 outpatient with development of hemolytic anemia and is now s/p 4u pRBC on this admission  -As we are initiating treatment today, we are anticipating patient may develop TLS and we may have to initiate HD as we cannot safely administer rasburicase  -Please check TLS labs BID starting 6pm on 9/1. Please ensure to check CMP, Mg, Phos, LDH, Uric acid.  ---If Uric Acid is >8, we may have to consider HD. Please contact the Hematology fellow on call prior to administration of Rasburicase. Please call the Hematology fellow on call if there are any concerning findings on the TLS labs.  -Nephrology consulted. Appreciate recommendations      Brittaney Ambrosio MD  Hematology/Oncology Fellow, PGY-6  Pager: [phone]  After 5pm and on weekends please page on-call fellow   72M hx DLBCL (tx with R-CHOP in 2003, with relapse in 2009 treated with BR) now with multiple areas of palpable lymphadenopathy with biopsies shown to be at least grade IIIA follicular lymphoma. Has had significant weight loss >20lbs in the last 6 months. Also noted to have an IgG lambda, IgG Kappa, and IgM Lambda monoclonal gammopathies. Prior to being sent to the ED his labs were suggestive of TLS with UA 13.7 s/p 3mg rasburicase on 8/23.      #Follicular Lymphoma  #Hemolytic Anemia  -Follows with Dr. Cordova at [facility].   -Originally diagnosed in 2003 s/p RCHOP with relapse in 2009 s/p BR. Recent outpatient PET/CT 8/2022 showed concern for POD with new LAD and subcutaneous tissue nodules  ---s/p FNA L cervical LN in June 2022 with path c/w grade 3A follicular lymphoma positive for BCL6 (3q27) breakpoint translocation and negative for MYC Rearrangement or BCL2-IGH gene rearrangement [translocation t(14;18) present in ~ 85% of FL].   ---Planned for excisional biopsy outpatient to confirm suspected 3B FL vs. transformation, but unable to be completed due to current admission. Patient presented with spontaneous TLS, now requiring inpatient treatment. Ideally, we would require an excisional biopsy to confirm the suspected diagnosis. However, patient is not optimized medically for an excisional biopsy inpatient, so we will treat for a presumptive diagnosis of relapsed FL with regimen below so as not to delay treatment:  -We will plan to initiate treatment with a modified regimen of mini-COEP plus Obinutuzumab on 9/1/22:  ------Cyclophosphamide 400 mg/m² on D1  ------Etoposide 40% dose reduced to 30 mg/m2 IV on D1-D3 of each cycle  ------Vincristine 2mg (flat dose) on D1  ------Prednisone 100mg PO daily x5 days (started on 8/29-9/2)  ------Obinutuzumab 100mg test dose on D1 + 900mg full dose on D2  -Excisional biopsy should be completed as soon as possible once patient is medically cleared, but as above, we will still plan to proceed with treatment.    #G6PD Deficiency  #Concern for TLS  -Patient is G6PD Deficient (G6PD 4.8) and is very high risk for TLS in the s/o spontaneous TLS on admission. He may require further doses of Rasburicase which can cause hemolytic anemia in the setting of G6PD Deficiency  -He is s/p rasburicase on 8/23 outpatient with development of hemolytic anemia and is now s/p 4u pRBC on this admission--repeat G6PD on 8/27 following transfusions now 11.8  -As we are initiating treatment today, we are anticipating patient may develop TLS and we may have to initiate HD as we cannot safely administer rasburicase  -Please check TLS labs BID starting 6pm on 9/1. Please ensure to check CMP, Mg, Phos, LDH, Uric acid.  ---If Uric Acid is >8, please give 3mg Rasburicase. Please contact the Hematology fellow on call prior to administration of Rasburicase. Please call the Hematology fellow on call if there are any concerning findings on the TLS labs.  -Nephrology consulted. Appreciate recommendations      Brittaney Ambrosio MD  Hematology/Oncology Fellow, PGY-6  Pager: [phone]  After 5pm and on weekends please page on-call fellow   72M hx DLBCL (tx with R-CHOP in 2003, with relapse in 2009 treated with BR) now with multiple areas of palpable lymphadenopathy with biopsies shown to be at least grade IIIA follicular lymphoma. Has had significant weight loss >20lbs in the last 6 months. Also noted to have an IgG lambda, IgG Kappa, and IgM Lambda monoclonal gammopathies. Prior to being sent to the ED his labs were suggestive of TLS with UA 13.7 s/p 3mg rasburicase on 8/23.      #Follicular Lymphoma  #Hemolytic Anemia  -Follows with Dr. Cordova at [facility].   -Originally diagnosed in 2003 s/p RCHOP with relapse in 2009 s/p BR. Recent outpatient PET/CT 8/2022 showed concern for POD with new LAD and subcutaneous tissue nodules  ---s/p FNA L cervical LN in June 2022 with path c/w grade 3A follicular lymphoma positive for BCL6 (3q27) breakpoint translocation and negative for MYC Rearrangement or BCL2-IGH gene rearrangement [translocation t(14;18) present in ~ 85% of FL].   ---Planned for excisional biopsy outpatient to confirm suspected 3B FL vs. transformation, but unable to be completed due to current admission. Patient presented with spontaneous TLS, now requiring inpatient treatment. Ideally, we would require an excisional biopsy to confirm the suspected diagnosis. However, patient is not optimized medically for an excisional biopsy inpatient, so we will treat for a presumptive diagnosis of relapsed FL with regimen below so as not to delay treatment:  -We will plan to initiate treatment with a modified regimen of mini-COEP plus Obinutuzumab on 9/1/22:  ------Cyclophosphamide 400 mg/m² on D1  ------Etoposide 40% dose reduced to 30 mg/m2 IV on D1-D3 of each cycle  ------Vincristine 2mg (flat dose) on D1  ------Prednisone 100mg PO daily x5 days (started on 8/29-9/2)  ------Obinutuzumab 100mg test dose on D1 + 900mg full dose on D2  -Excisional biopsy should be completed as soon as possible once patient is medically cleared, but as above, we will still plan to proceed with treatment.    #G6PD Deficiency  #Concern for TLS  -Patient is G6PD Deficient (G6PD 4.8) and is very high risk for TLS in the s/o spontaneous TLS on admission. He may require further doses of Rasburicase which can cause hemolytic anemia in the setting of G6PD Deficiency  -He is s/p rasburicase on 8/23 outpatient with development of hemolytic anemia and is now s/p 4u pRBC on this admission--repeat G6PD on 8/27 following transfusions now 11.1  -As we are initiating treatment today, we are anticipating patient may develop TLS and we may have to initiate HD as we cannot safely administer rasburicase  -Please check TLS labs BID starting 6pm on 9/1. Please ensure to check CMP, Mg, Phos, LDH, Uric acid.  ---If Uric Acid is >8, please give 3mg Rasburicase. Please contact the Hematology fellow on call prior to administration of Rasburicase. Please call the Hematology fellow on call if there are any concerning findings on the TLS labs.  -Nephrology consulted. Appreciate recommendations      Brittaney Ambrosio MD  Hematology/Oncology Fellow, PGY-6  Pager: [phone]  After 5pm and on weekends please page on-call fellow

## 2022-09-01 NOTE — PROGRESS NOTE ADULT - ATTENDING COMMENTS
73yo M w/ extensive PMHx including DLBCL, G6PD deficiency, complete heart block s/p PPM, HFrEF (new EF 20%), CKD 3b, paroxysmal atrial fibrillation, presents with anemia, pt was treated for TLS syndrome w/ rasburicase on 8/23 and current episode concerning for an acute hemolytic anemia in setting of G6PD deficiency. Patient also p/w acute hypoxic respiratory failure due to heart failure exacerbation requiring bipap initially. He was eventually titrate off bipap and nasal cannula, now breathing comfortably on room air after initiation of diuretics.    #Hemolytic anemia  -s/p transfusion, counts stable, continue to trend CBC  -G6PD level pending  -c/w A/C (eliquis switched to hep gtt in anticipation of bx)  -heme following - likely will need another dose of rasburicase, will trend TLS labs and CBC closely    #Follicular lymphoma  #TLS  -consulted renal for TLS  -started on prednisone per heme - plan to do inpatient treatment today  -cont allopurinol   -biopsy will be done w/local anesthesia     #acute on chronic systolic heart failure  -heart failure recs appreciated, consulting cardio-oncology now too  -Low dose afterload reduction w/ hydral 10 TID PO  -switched coreg to metoprolol due to soft BPs, will add entresto per HF recs  -cont bumex 2mg BID  -echo shows EF of 20%    Metabolic encephalopathy  - patient has been oriented to self and sometimes place for the past few days, but no acute changes in mentation  - CT head incidentally found to have acute/subacute R parietal stroke - consult neurology, check lipid panel, hba1c  - elevated wbc count is likely due to steroid initiation - but will check UA/urine culture, blood culture. No diarrhea/cough/phlegm/rash.

## 2022-09-01 NOTE — CONSULT NOTE ADULT - ASSESSMENT
72 year old male with PMHx HTN, CKD stage II, complete heart block (PPM in place), HLD, HFrEF (45% in 2019), and DLBCL (tx with R-CHOP in 2003, with relapse in 2009 treated with BR) now with recently diagnosed grade 3 follicular lymphoma on 8/23 who presents with anemia and SOB, patient was pending a surgical biopsy on 8/25. Admitted for TLS and rasburicase-triggered G6PD hemolytic anemia. Given that patient has developed confusion this admission and is now AOx1, primary team obtained a CTH, which shows acute/subacute R parietal infarction (larger in size when compared with 5/2019), chronic lacunar infarct in R basal ganglia, mild chronic white matter microvascular disease, no hemorrhagic transformation. Denies headache, weakness, numbness, dizziness, visual disturbances. NIHSS 2-did not know month and age. Of note, patient has had multiple prior episodes of confusion and cognitive difficulties.     Impression: Recurrent episodes of confusion with no focal neurological deficits unlikely to be related to R parietal infarction, which is of unknown etiology. Reported episodes to occur mostly when patient is ill.     Recommendations:   [] ASA 81 mg for secondary stroke prevention if no contraindications per primary team  []Lipitor 80 mg daily   [] MRI brain wo contrast   [] MRA H wo contrast  [] MRA N w contrast   [] Please send lipid panel and HgA1C    Case d/w stroke fellow under supervision of stroke attending.  72 year old male with PMHx HTN, CKD stage II, complete heart block (PPM in place), HLD, HFrEF (45% in 2019), and DLBCL (tx with R-CHOP in 2003, with relapse in 2009 treated with BR) now with recently diagnosed grade 3 follicular lymphoma on 8/23 who presents with anemia and SOB, patient was pending a surgical biopsy on 8/25. Admitted for TLS and rasburicase-triggered G6PD hemolytic anemia. Given that patient has developed confusion this admission and is now AOx1, primary team obtained a CTH, which shows acute/subacute R parietal infarction (larger in size when compared with 5/2019), chronic lacunar infarct in R basal ganglia, mild chronic white matter microvascular disease, no hemorrhagic transformation. Denies headache, weakness, numbness, dizziness, visual disturbances. NIHSS 2-did not know month and age. Of note, patient has had multiple prior episodes of confusion and cognitive difficulties.     Impression: Recurrent episodes of confusion with no focal neurological deficits unlikely to be related to R parietal infarction, which is of unknown etiology. Reported episodes to occur mostly when patient is ill.     Recommendations:   [] ASA 81 mg for secondary stroke prevention if no contraindications per primary team  []Lipitor 80 mg daily   [] MRI brain w/ and wo contrast   [] MRA H wo contrast  [] MRA N w contrast   [] Please send lipid panel and HgA1C    Case d/w stroke fellow under supervision of stroke attending.

## 2022-09-01 NOTE — PROGRESS NOTE ADULT - PROBLEM SELECTOR PLAN 2
Unclear etiology, chemotherapy related vs direct toxicity vs. heart failure exacerbation. Troponins peaked.  -If no c/i and no biopsy planned would start ASA

## 2022-09-01 NOTE — PROGRESS NOTE ADULT - SUBJECTIVE AND OBJECTIVE BOX
INTERVAL HPI/OVERNIGHT EVENTS:  Patient S&E at bedside. No o/n events. Patient is anxious to start treatment this morning. CTH overnight for a period or AMS showed an acute/subacute infarct in the same area as a chronic infarct with no s/o hemorrhagic transformation. Patient has no complaints when seen. AAOx3.    VITAL SIGNS:  T(F): 97.3 (09-01-22 @ 04:36)  HR: 79 (09-01-22 @ 04:36)  BP: 127/75 (09-01-22 @ 04:36)  RR: 18 (09-01-22 @ 04:36)  SpO2: 98% (09-01-22 @ 04:36)  Wt(kg): --    PHYSICAL EXAM:    Constitutional: NAD  Eyes: EOMI, sclera non-icteric  Neck: supple  Respiratory: CTAB, no wheezes or crackles   Cardiovascular: RRR  Gastrointestinal: soft, NTND, + BS  Extremities: no cyanosis, clubbing or edema   Neurological: awake and alert      MEDICATIONS  (STANDING):  acyclovir   Oral Tab/Cap 400 milliGRAM(s) Oral two times a day  allopurinol 100 milliGRAM(s) Oral daily  atorvastatin 40 milliGRAM(s) Oral at bedtime  buMETAnide Injectable 2 milliGRAM(s) IV Push two times a day  chlorhexidine 2% Cloths 1 Application(s) Topical daily  chlorhexidine 4% Liquid 1 Application(s) Topical <User Schedule>  cyclophosphamide IVPB (eMAR) 776 milliGRAM(s) IV Intermittent once  dextrose 5%. 1000 milliLiter(s) (50 mL/Hr) IV Continuous <Continuous>  dextrose 5%. 1000 milliLiter(s) (100 mL/Hr) IV Continuous <Continuous>  dextrose 50% Injectable 25 Gram(s) IV Push once  dextrose 50% Injectable 12.5 Gram(s) IV Push once  dextrose 50% Injectable 25 Gram(s) IV Push once  etoposide (TOPOSAR) IVPB (eMAR) 58 milliGRAM(s) IV Intermittent every 24 hours  fosaprepitant IVPB 150 milliGRAM(s) IV Intermittent once  glucagon  Injectable 1 milliGRAM(s) IntraMuscular once  heparin  Infusion.  Unit(s)/Hr (14 mL/Hr) IV Continuous <Continuous>  hydrALAZINE 10 milliGRAM(s) Oral every 8 hours  insulin lispro (ADMELOG) corrective regimen sliding scale   SubCutaneous Before meals and at bedtime  metoprolol tartrate 12.5 milliGRAM(s) Oral every 12 hours  multivitamin 1 Tablet(s) Oral daily  obinutuzumab IVPB (eMAR) 100 milliGRAM(s) IV Intermittent once  obinutuzumab IVPB (eMAR) 900 milliGRAM(s) IV Intermittent once  ondansetron Injectable 8 milliGRAM(s) IV Push once  potassium chloride    Tablet ER 20 milliEquivalent(s) Oral every 2 hours  predniSONE   Tablet 100 milliGRAM(s) Oral daily  vinCRIStine IVPB (eMAR) 2 milliGRAM(s) IV Intermittent once    MEDICATIONS  (PRN):  dextrose Oral Gel 15 Gram(s) Oral once PRN Blood Glucose LESS THAN 70 milliGRAM(s)/deciliter  heparin   Injectable 6500 Unit(s) IV Push every 6 hours PRN For aPTT less than 40  heparin   Injectable 3000 Unit(s) IV Push every 6 hours PRN For aPTT between 40 - 57  melatonin 3 milliGRAM(s) Oral at bedtime PRN Insomnia  sodium chloride 0.9% lock flush 10 milliLiter(s) IV Push every 1 hour PRN Pre/post blood products, medications, blood draw, and to maintain line patency      Allergies    No Known Allergies    Intolerances        LABS:                        9.2    13.41 )-----------( 282      ( 01 Sep 2022 06:25 )             29.7     09-01    144  |  104  |  95<H>  ----------------------------<  113<H>  3.0<L>   |  24  |  2.05<H>    Ca    9.5      01 Sep 2022 06:25  Phos  4.4     09-01  Mg     2.0     09-01    TPro  7.8  /  Alb  3.9  /  TBili  1.2  /  DBili  x   /  AST  13  /  ALT  12  /  AlkPhos  156<H>  09-01    PTT - ( 01 Sep 2022 09:02 )  PTT:30.2 sec      RADIOLOGY & ADDITIONAL TESTS:  Studies reviewed.

## 2022-09-01 NOTE — PROGRESS NOTE ADULT - ATTENDING COMMENTS
R Skaggs is a 72 year old male with follicular B cell lymphoma; Dr Cordova has recommended to proceed with obinutuzumab today. Second dose is scheduled for 09/02/2022. Please  monitor urinary output and offer allopurinol at renal adjusted dosing. If rasburicase is necessary current level of G six PD is elevated due to packed red cell transfusion.

## 2022-09-01 NOTE — PROGRESS NOTE ADULT - SUBJECTIVE AND OBJECTIVE BOX
Progress Note    08-27-22 (5d)    Patient is a 72y old  Male who presents with a chief complaint of lymphoid malignancy with subcutaneous lesions; congestive heart failure (31 Aug 2022 12:02)      Subjective / Overnight Events :  - No acute events overnight.  - Pt seen and examined at bedside.     Additional ROS (if any):    MEDICATIONS  (STANDING):  acyclovir   Oral Tab/Cap 400 milliGRAM(s) Oral two times a day  allopurinol 100 milliGRAM(s) Oral daily  atorvastatin 40 milliGRAM(s) Oral at bedtime  buMETAnide Injectable 2 milliGRAM(s) IV Push two times a day  chlorhexidine 2% Cloths 1 Application(s) Topical daily  chlorhexidine 4% Liquid 1 Application(s) Topical <User Schedule>  dextrose 5%. 1000 milliLiter(s) (50 mL/Hr) IV Continuous <Continuous>  dextrose 5%. 1000 milliLiter(s) (100 mL/Hr) IV Continuous <Continuous>  dextrose 50% Injectable 25 Gram(s) IV Push once  dextrose 50% Injectable 12.5 Gram(s) IV Push once  dextrose 50% Injectable 25 Gram(s) IV Push once  glucagon  Injectable 1 milliGRAM(s) IntraMuscular once  heparin  Infusion.  Unit(s)/Hr (14 mL/Hr) IV Continuous <Continuous>  hydrALAZINE 10 milliGRAM(s) Oral every 8 hours  insulin lispro (ADMELOG) corrective regimen sliding scale   SubCutaneous Before meals and at bedtime  metoprolol tartrate 12.5 milliGRAM(s) Oral every 12 hours  multivitamin 1 Tablet(s) Oral daily  predniSONE   Tablet 100 milliGRAM(s) Oral daily    MEDICATIONS  (PRN):  dextrose Oral Gel 15 Gram(s) Oral once PRN Blood Glucose LESS THAN 70 milliGRAM(s)/deciliter  heparin   Injectable 6500 Unit(s) IV Push every 6 hours PRN For aPTT less than 40  heparin   Injectable 3000 Unit(s) IV Push every 6 hours PRN For aPTT between 40 - 57  melatonin 3 milliGRAM(s) Oral at bedtime PRN Insomnia  sodium chloride 0.9% lock flush 10 milliLiter(s) IV Push every 1 hour PRN Pre/post blood products, medications, blood draw, and to maintain line patency          PHYSICAL EXAM:  Vital Signs Last 24 Hrs  T(C): 36.3 (01 Sep 2022 04:36), Max: 36.6 (31 Aug 2022 20:01)  T(F): 97.3 (01 Sep 2022 04:36), Max: 97.9 (31 Aug 2022 20:01)  HR: 79 (01 Sep 2022 04:36) (79 - 91)  BP: 127/75 (01 Sep 2022 04:36) (115/61 - 127/75)  BP(mean): --  RR: 18 (01 Sep 2022 04:36) (18 - 18)  SpO2: 98% (01 Sep 2022 04:36) (95% - 98%)    Parameters below as of 01 Sep 2022 04:36  Patient On (Oxygen Delivery Method): room air        I&O's Summary    31 Aug 2022 07:01  -  01 Sep 2022 07:00  --------------------------------------------------------  IN: 550 mL / OUT: 2100 mL / NET: -1550 mL        General: NAD, non-toxic appearing   HEENT: PERRLA, EOMi, no scleral icterus  CV: RRR, normal S1 and S2, no m/r/g  Lungs: normal respiratory effort. CTAB, no wheezes, rales, or rhonchi  Abd: soft, nontender, nondistended  Ext: no edema, 2+ peripheral pulses   Pysch: AAOx3, appropriate affect   Neuro: grossly non-focal, moving all extremities spontaneously   Skin: no rashes or lesions     LABS:  CAPILLARY BLOOD GLUCOSE      POCT Blood Glucose.: 221 mg/dL (31 Aug 2022 21:20)  POCT Blood Glucose.: 254 mg/dL (31 Aug 2022 16:14)  POCT Blood Glucose.: 213 mg/dL (31 Aug 2022 11:26)  POCT Blood Glucose.: 155 mg/dL (31 Aug 2022 07:19)                              9.2    13.41 )-----------( 282      ( 01 Sep 2022 06:25 )             29.7       WBC Trend: 13.41<--, 16.34<--, 15.53<--  Hb Trend: 9.2<--, 9.0<--, 8.7<--, 8.6<--, 9.2<--    08-31    140  |  100  |  99<H>  ----------------------------<  135<H>  3.5   |  24  |  2.04<H>    Ca    9.9      31 Aug 2022 06:59  Phos  4.7     08-31  Mg     2.1     08-31    TPro  7.9  /  Alb  4.0  /  TBili  1.2  /  DBili  x   /  AST  14  /  ALT  14  /  AlkPhos  164<H>  08-31    PTT - ( 01 Sep 2022 00:32 )  PTT:41.8 sec              RADIOLOGY & ADDITIONAL TESTS: Reviewed Progress Note    08-27-22 (5d)    Patient is a 72y old  Male who presents with a chief complaint of lymphoid malignancy with subcutaneous lesions; congestive heart failure (31 Aug 2022 12:02)      Subjective / Overnight Events :  - No acute events overnight.  - Pt seen and examined at bedside. No complaints. Eating well but unsure of last BM. Says no trouble with urination. Still confused and unsure what is happening around him.     Additional ROS (if any):    MEDICATIONS  (STANDING):  acyclovir   Oral Tab/Cap 400 milliGRAM(s) Oral two times a day  allopurinol 100 milliGRAM(s) Oral daily  atorvastatin 40 milliGRAM(s) Oral at bedtime  buMETAnide Injectable 2 milliGRAM(s) IV Push two times a day  chlorhexidine 2% Cloths 1 Application(s) Topical daily  chlorhexidine 4% Liquid 1 Application(s) Topical <User Schedule>  dextrose 5%. 1000 milliLiter(s) (50 mL/Hr) IV Continuous <Continuous>  dextrose 5%. 1000 milliLiter(s) (100 mL/Hr) IV Continuous <Continuous>  dextrose 50% Injectable 25 Gram(s) IV Push once  dextrose 50% Injectable 12.5 Gram(s) IV Push once  dextrose 50% Injectable 25 Gram(s) IV Push once  glucagon  Injectable 1 milliGRAM(s) IntraMuscular once  heparin  Infusion.  Unit(s)/Hr (14 mL/Hr) IV Continuous <Continuous>  hydrALAZINE 10 milliGRAM(s) Oral every 8 hours  insulin lispro (ADMELOG) corrective regimen sliding scale   SubCutaneous Before meals and at bedtime  metoprolol tartrate 12.5 milliGRAM(s) Oral every 12 hours  multivitamin 1 Tablet(s) Oral daily  predniSONE   Tablet 100 milliGRAM(s) Oral daily    MEDICATIONS  (PRN):  dextrose Oral Gel 15 Gram(s) Oral once PRN Blood Glucose LESS THAN 70 milliGRAM(s)/deciliter  heparin   Injectable 6500 Unit(s) IV Push every 6 hours PRN For aPTT less than 40  heparin   Injectable 3000 Unit(s) IV Push every 6 hours PRN For aPTT between 40 - 57  melatonin 3 milliGRAM(s) Oral at bedtime PRN Insomnia  sodium chloride 0.9% lock flush 10 milliLiter(s) IV Push every 1 hour PRN Pre/post blood products, medications, blood draw, and to maintain line patency          PHYSICAL EXAM:  Vital Signs Last 24 Hrs  T(C): 36.3 (01 Sep 2022 04:36), Max: 36.6 (31 Aug 2022 20:01)  T(F): 97.3 (01 Sep 2022 04:36), Max: 97.9 (31 Aug 2022 20:01)  HR: 79 (01 Sep 2022 04:36) (79 - 91)  BP: 127/75 (01 Sep 2022 04:36) (115/61 - 127/75)  BP(mean): --  RR: 18 (01 Sep 2022 04:36) (18 - 18)  SpO2: 98% (01 Sep 2022 04:36) (95% - 98%)    Parameters below as of 01 Sep 2022 04:36  Patient On (Oxygen Delivery Method): room air        I&O's Summary    31 Aug 2022 07:01  -  01 Sep 2022 07:00  --------------------------------------------------------  IN: 550 mL / OUT: 2100 mL / NET: -1550 mL        General: NAD, non-toxic appearing   HEENT: PERRLA, EOMi, no scleral icterus  CV: RRR, normal S1 and S2, no m/r/g  Lungs: normal respiratory effort. CTAB, no wheezes, rales, or rhonchi  Abd: soft, nontender, nondistended  Back: mass in midline  Ext: no edema, 2+ peripheral pulses. PICC in right arm.   Pysch: AAOx1, appropriate affect   Neuro: grossly non-focal, moving all extremities spontaneously   Skin: no rashes or lesions     LABS:  CAPILLARY BLOOD GLUCOSE      POCT Blood Glucose.: 221 mg/dL (31 Aug 2022 21:20)  POCT Blood Glucose.: 254 mg/dL (31 Aug 2022 16:14)  POCT Blood Glucose.: 213 mg/dL (31 Aug 2022 11:26)  POCT Blood Glucose.: 155 mg/dL (31 Aug 2022 07:19)                              9.2    13.41 )-----------( 282      ( 01 Sep 2022 06:25 )             29.7       WBC Trend: 13.41<--, 16.34<--, 15.53<--  Hb Trend: 9.2<--, 9.0<--, 8.7<--, 8.6<--, 9.2<--    08-31    140  |  100  |  99<H>  ----------------------------<  135<H>  3.5   |  24  |  2.04<H>    Ca    9.9      31 Aug 2022 06:59  Phos  4.7     08-31  Mg     2.1     08-31    TPro  7.9  /  Alb  4.0  /  TBili  1.2  /  DBili  x   /  AST  14  /  ALT  14  /  AlkPhos  164<H>  08-31    PTT - ( 01 Sep 2022 00:32 )  PTT:41.8 sec              RADIOLOGY & ADDITIONAL TESTS: Reviewed

## 2022-09-01 NOTE — PROGRESS NOTE ADULT - PROBLEM SELECTOR PLAN 4
Patient found to have acute-subacute stroke  -Would recommend discussion with neuro. Would like them to comment on BP goals if any and we would likely plan for non-invasive ischemic evaluation this admission

## 2022-09-01 NOTE — CONSULT NOTE ADULT - ATTENDING COMMENTS
The ECG revealed a sinus rhythm.  Briefly   72 year old male with HTN, CKD stage II, complete heart block (PPM in place), HLD, HFrEF (45% in 2019), and DLBCL (tx with R-CHOP in 2003, with relapse in 2009 treated with BR) now with recently diagnosed grade 3 follicular lymphoma on 8/23 who presents with anemia and SOB, patient was pending a surgical biopsy on 8/25. Admitted for TLS and rasburicase-triggered G6PD hemolytic anemia. Given that patient has developed confusion this admission and is now AOx1, primary team obtained a CTH, which shows acute/subacute R parietal infarction (larger in size when compared with 5/2019), chronic lacunar infarct in R basal ganglia, mild chronic white matter microvascular disease, no hemorrhagic transformation. Denies headache, weakness, numbness, dizziness, visual disturbances. NIHSS 2-did not know month and age. Of note, patient has had multiple prior episodes of confusion and cognitive difficulties.     CTH, which shows acute/subacute R parietal infarction, chronic R BG infarct noted   LDL 81     Impression:  R parietal infarction,  likely 2/2 AF     Recommendations:   - stroke is now at least subacute appearing.  can likely start titrating BP to normotension.  vessel imaging MRA H/N to help determine if any athero. if any severe stenosis may need to avoid hypotension   - heparin for AF.  no need for asa from stroke standpoint when on AC   - High dose statin therapy - atorvastatin 40mg PO daily. LDL goal <70mg/dL.  - MRI brain w/ and w/o , and MRA H/N   - Hemoglobin A1c   - TTE, no need from stroke standpoint   - telemetry  - PT/OT/SS/SLP, OOBC  - check FS, glucose control <180  - GI/DVT ppx  - Counseling on diet, exercise, and medication adherence was done  - Counseling on smoking cessation and alcohol consumption offered when appropriate.  - Pain assessed and judicious use of narcotics when appropriate was discussed.    - Stroke education given when appropriate.  - Importance of fall prevention discussed.   - Differential diagnosis and plan of care discussed with patient and/or family and primary team  - Thank you for allowing me to participate in the care of this patient. Call with questions.  Stone Mo MD  Vascular Neurology  Office: [phone] Briefly   72 year old male with HTN, CKD stage II, complete heart block (PPM in place), HLD, HFrEF (45% in 2019), and DLBCL (tx with R-CHOP in 2003, with relapse in 2009 treated with BR) now with recently diagnosed grade 3 follicular lymphoma on 8/23 who presents with anemia and SOB, patient was pending a surgical biopsy on 8/25. Admitted for TLS and rasburicase-triggered G6PD hemolytic anemia. Given that patient has developed confusion this admission and is now AOx1, primary team obtained a CTH, which shows acute/subacute R parietal infarction (larger in size when compared with 5/2019), chronic lacunar infarct in R basal ganglia, mild chronic white matter microvascular disease, no hemorrhagic transformation. Denies headache, weakness, numbness, dizziness, visual disturbances. NIHSS 2-did not know month and age. Of note, patient has had multiple prior episodes of confusion and cognitive difficulties.     CTH, which shows acute/subacute R parietal infarction, chronic R BG infarct noted   LDL 81     Impression:  R parietal infarction,  likely 2/2 AF     Recommendations:   - would be low risk for bx.  if heparin held, resume when able   - stroke is now at least subacute appearing.  can likely start titrating BP to normotension.  vessel imaging MRA H/N to help determine if any athero. if any severe stenosis may need to avoid hypotension   - heparin for AF.  no need for asa from stroke standpoint when on AC   - High dose statin therapy - atorvastatin 40mg PO daily. LDL goal <70mg/dL.  - MRI brain w/ and w/o , and MRA H/N   - Hemoglobin A1c   - TTE, no need from stroke standpoint   - telemetry  - PT/OT/SS/SLP, OOBC  - check FS, glucose control <180  - GI/DVT ppx  - Counseling on diet, exercise, and medication adherence was done  - Counseling on smoking cessation and alcohol consumption offered when appropriate.  - Pain assessed and judicious use of narcotics when appropriate was discussed.    - Stroke education given when appropriate.  - Importance of fall prevention discussed.   - Differential diagnosis and plan of care discussed with patient and/or family and primary team  - Thank you for allowing me to participate in the care of this patient. Call with questions.  Stone Mo MD  Vascular Neurology  Office: [phone]

## 2022-09-01 NOTE — PROGRESS NOTE ADULT - ASSESSMENT
73 yo M w/ PMH CHB s/p PPM upgraded to CRT-P, HFrEF 2/2 chemo, DLBCL s/p chemo, HTN, nonobstructive CAD, Aflutter on apixaban who presented w/ SOB and anemia. 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 He was started on rasburicase for TLS but with notable G6PD hemolytics anemia. Was told to come into the ED and given 3u PRBC in total so far with improvement of anemia. He was also started on BiPAP given his tachypnea. CXRAY showed R pleural effusion with known R middle lobe opacity found to be in heart failure exacerbation.     Patient planned for excisional biopsy under general anesthesia, patient severe MR on ECHO, still fluid overloaded, and elevated troponin without re-evaluation of his coronaries. Would not pursue general anesthesia at this time, would recommend if possible procedure under local anesthesia, however would have primary team perform assessment of acute change in neurological status found to have subacute vs. acute stroke.     Patient not optimized for general anesthesia, would recommend local anesthesia for biopsy of tissue sample?         Echo:  8/27/22: LVEF 20%, decreased RV function, severe MR  11/7/2019: LVEF 40%, moderate pulmonary hypertension, enlarged LA size, severe mitral regurgitation    Cardiac Cath: 5/2019, 70% 1st diagonal, 60% distal circumflex, otherwise no occlusive disease      Pacemaker: 9/30/2019, [employer] [id number] BiV DDDR

## 2022-09-01 NOTE — PROGRESS NOTE ADULT - PROBLEM SELECTOR PLAN 2
- h/o grade 3 follicular lymphoma, pt was pending surgical biopsy on 8/23, however in heme/onc outpt labs s/f TLS with uric acid 13.7, pt was started rasburicase on 8/23 and developed rasburicase-triggered G6PD hemolysis with hgb 5.7 on 8/26  - UA 6.4 from 13.7 s/p rasburicase on 8/23, now on allopurinol 100mg   - follow-up with heme on 9/1:      - Started prednisone 100mg daily x5 (day 3/5) days with intent to start Obinutuzumab-COEP inpatient 9/1      - PICC placed (8/30) however patient removed it, PICC placed again (8/31) and ready for chemotherapy initiation      - trend TLS labs (CMP, Mg, Phos, LDH, Uric Acid) q12hrs as patient is high risk at 6am and 6pm daily      - ?might need rasburicase following chemotherapy and can consider transfusions - will f/u regarding  - Surgery consulted for biopsy for definitive diagnosis      - can consider biopsy under local anesthesia, ongoing discussion involving cards, as per cards no need for medical clearance for local biopsy, will f/u with surgery regarding decision and timing  - cardiology consult      - as per HF team, patient still volume overloaded and not cleared medically for biopsy under general anesthesia      - c/w current management with diuresis, metoprolol, hydralazine      - pending bladder scan to r/o obstruction  - cardiology oncology consult      - repeat echo once volume status optimized       - resume ACE or switch to Entreto, careful monitoring of renal function       - SGLT2 is possible  - cardiac oncology consult - h/o grade 3 follicular lymphoma, pt was pending surgical biopsy on 8/23, however in heme/onc outpt labs s/f TLS with uric acid 13.7, pt was started rasburicase on 8/23 and developed rasburicase-triggered G6PD hemolysis with hgb 5.7 on 8/26  - UA 6.4 from 13.7 s/p rasburicase on 8/23, now on allopurinol 100mg   - follow-up with heme on 9/1:      - Started prednisone 100mg daily x5 (day 3/5) days with intent to start Obinutuzumab-COEP inpatient 9/1      - PICC placed (8/30) however patient removed it, PICC placed again (8/31) and ready for chemotherapy initiation      - trend TLS labs (CMP, Mg, Phos, LDH, Uric Acid) q12hrs as patient is high risk at 6am and 6pm daily      - ?might need rasburicase following chemotherapy and can consider transfusions - will f/u regarding  - Surgery consulted for biopsy for definitive diagnosis      - can consider biopsy under local anesthesia, ongoing discussion involving cards, as per cards no need for medical clearance for local biopsy, pending biopsy possibly tomorrow   - cardiology consult      - as per HF team, patient still volume overloaded and not cleared medically for biopsy under general anesthesia      - c/w current management with diuresis, metoprolol, hydralazine      - pending bladder scan to r/o obstruction  - cardiology oncology consult      - repeat echo once volume status optimized       - resume ACE or switch to Entreto, will hold off until SCr stabilizes, downtrend or stay at current level      - SGLT2 is possible

## 2022-09-02 LAB
ALBUMIN SERPL ELPH-MCNC: 3.5 G/DL — SIGNIFICANT CHANGE UP (ref 3.3–5)
ALBUMIN SERPL ELPH-MCNC: 3.7 G/DL — SIGNIFICANT CHANGE UP (ref 3.3–5)
ALP SERPL-CCNC: 144 U/L — HIGH (ref 40–120)
ALP SERPL-CCNC: 154 U/L — HIGH (ref 40–120)
ALT FLD-CCNC: 15 U/L — SIGNIFICANT CHANGE UP (ref 10–45)
ALT FLD-CCNC: 19 U/L — SIGNIFICANT CHANGE UP (ref 10–45)
ANION GAP SERPL CALC-SCNC: 13 MMOL/L — SIGNIFICANT CHANGE UP (ref 5–17)
ANION GAP SERPL CALC-SCNC: 15 MMOL/L — SIGNIFICANT CHANGE UP (ref 5–17)
APTT BLD: 143 SEC — CRITICAL HIGH (ref 27.5–35.5)
APTT BLD: 35.2 SEC — SIGNIFICANT CHANGE UP (ref 27.5–35.5)
APTT BLD: >200 SEC — CRITICAL HIGH (ref 27.5–35.5)
AST SERPL-CCNC: 17 U/L — SIGNIFICANT CHANGE UP (ref 10–40)
AST SERPL-CCNC: 20 U/L — SIGNIFICANT CHANGE UP (ref 10–40)
BILIRUB SERPL-MCNC: 1.2 MG/DL — SIGNIFICANT CHANGE UP (ref 0.2–1.2)
BILIRUB SERPL-MCNC: 1.4 MG/DL — HIGH (ref 0.2–1.2)
BUN SERPL-MCNC: 85 MG/DL — HIGH (ref 7–23)
BUN SERPL-MCNC: 90 MG/DL — HIGH (ref 7–23)
CALCIUM SERPL-MCNC: 8.8 MG/DL — SIGNIFICANT CHANGE UP (ref 8.4–10.5)
CALCIUM SERPL-MCNC: 9.2 MG/DL — SIGNIFICANT CHANGE UP (ref 8.4–10.5)
CHLORIDE SERPL-SCNC: 104 MMOL/L — SIGNIFICANT CHANGE UP (ref 96–108)
CHLORIDE SERPL-SCNC: 105 MMOL/L — SIGNIFICANT CHANGE UP (ref 96–108)
CHOLEST SERPL-MCNC: 140 MG/DL — SIGNIFICANT CHANGE UP
CO2 SERPL-SCNC: 24 MMOL/L — SIGNIFICANT CHANGE UP (ref 22–31)
CO2 SERPL-SCNC: 26 MMOL/L — SIGNIFICANT CHANGE UP (ref 22–31)
CREAT SERPL-MCNC: 1.7 MG/DL — HIGH (ref 0.5–1.3)
CREAT SERPL-MCNC: 1.72 MG/DL — HIGH (ref 0.5–1.3)
CULTURE RESULTS: SIGNIFICANT CHANGE UP
EGFR: 42 ML/MIN/1.73M2 — LOW
EGFR: 43 ML/MIN/1.73M2 — LOW
GLUCOSE BLDC GLUCOMTR-MCNC: 213 MG/DL — HIGH (ref 70–99)
GLUCOSE BLDC GLUCOMTR-MCNC: 214 MG/DL — HIGH (ref 70–99)
GLUCOSE BLDC GLUCOMTR-MCNC: 227 MG/DL — HIGH (ref 70–99)
GLUCOSE BLDC GLUCOMTR-MCNC: 242 MG/DL — HIGH (ref 70–99)
GLUCOSE BLDC GLUCOMTR-MCNC: 250 MG/DL — HIGH (ref 70–99)
GLUCOSE SERPL-MCNC: 200 MG/DL — HIGH (ref 70–99)
GLUCOSE SERPL-MCNC: 241 MG/DL — HIGH (ref 70–99)
HAPTOGLOB SERPL-MCNC: 153 MG/DL — SIGNIFICANT CHANGE UP (ref 34–200)
HAPTOGLOB SERPL-MCNC: 154 MG/DL — SIGNIFICANT CHANGE UP (ref 34–200)
HAPTOGLOB SERPL-MCNC: 157 MG/DL — SIGNIFICANT CHANGE UP (ref 34–200)
HCT VFR BLD CALC: 33.3 % — LOW (ref 39–50)
HDLC SERPL-MCNC: 40 MG/DL — LOW
HGB BLD-MCNC: 10.1 G/DL — LOW (ref 13–17)
LDH SERPL L TO P-CCNC: 295 U/L — HIGH (ref 50–242)
LDH SERPL L TO P-CCNC: 362 U/L — HIGH (ref 50–242)
LIPID PNL WITH DIRECT LDL SERPL: 81 MG/DL — SIGNIFICANT CHANGE UP
MAGNESIUM SERPL-MCNC: 1.8 MG/DL — SIGNIFICANT CHANGE UP (ref 1.6–2.6)
MAGNESIUM SERPL-MCNC: 2 MG/DL — SIGNIFICANT CHANGE UP (ref 1.6–2.6)
MCHC RBC-ENTMCNC: 26.4 PG — LOW (ref 27–34)
MCHC RBC-ENTMCNC: 30.3 GM/DL — LOW (ref 32–36)
MCV RBC AUTO: 86.9 FL — SIGNIFICANT CHANGE UP (ref 80–100)
NON HDL CHOLESTEROL: 100 MG/DL — SIGNIFICANT CHANGE UP
NRBC # BLD: 0 /100 WBCS — SIGNIFICANT CHANGE UP (ref 0–0)
PHOSPHATE SERPL-MCNC: 5.2 MG/DL — HIGH (ref 2.5–4.5)
PHOSPHATE SERPL-MCNC: 5.6 MG/DL — HIGH (ref 2.5–4.5)
PLATELET # BLD AUTO: 285 K/UL — SIGNIFICANT CHANGE UP (ref 150–400)
POTASSIUM SERPL-MCNC: 3.5 MMOL/L — SIGNIFICANT CHANGE UP (ref 3.5–5.3)
POTASSIUM SERPL-MCNC: 3.7 MMOL/L — SIGNIFICANT CHANGE UP (ref 3.5–5.3)
POTASSIUM SERPL-SCNC: 3.5 MMOL/L — SIGNIFICANT CHANGE UP (ref 3.5–5.3)
POTASSIUM SERPL-SCNC: 3.7 MMOL/L — SIGNIFICANT CHANGE UP (ref 3.5–5.3)
PROT SERPL-MCNC: 7.2 G/DL — SIGNIFICANT CHANGE UP (ref 6–8.3)
PROT SERPL-MCNC: 8.1 G/DL — SIGNIFICANT CHANGE UP (ref 6–8.3)
RBC # BLD: 3.83 M/UL — LOW (ref 4.2–5.8)
RBC # FLD: 19.7 % — HIGH (ref 10.3–14.5)
SODIUM SERPL-SCNC: 143 MMOL/L — SIGNIFICANT CHANGE UP (ref 135–145)
SODIUM SERPL-SCNC: 144 MMOL/L — SIGNIFICANT CHANGE UP (ref 135–145)
SPECIMEN SOURCE: SIGNIFICANT CHANGE UP
TRIGL SERPL-MCNC: 93 MG/DL — SIGNIFICANT CHANGE UP
URATE SERPL-MCNC: 8.2 MG/DL — SIGNIFICANT CHANGE UP (ref 3.4–8.8)
URATE SERPL-MCNC: 8.3 MG/DL — SIGNIFICANT CHANGE UP (ref 3.4–8.8)
WBC # BLD: 13.7 K/UL — HIGH (ref 3.8–10.5)
WBC # FLD AUTO: 13.7 K/UL — HIGH (ref 3.8–10.5)

## 2022-09-02 PROCEDURE — 99233 SBSQ HOSP IP/OBS HIGH 50: CPT | Mod: GC

## 2022-09-02 PROCEDURE — 93306 TTE W/DOPPLER COMPLETE: CPT | Mod: 26

## 2022-09-02 RX ORDER — SEVELAMER CARBONATE 2400 MG/1
800 POWDER, FOR SUSPENSION ORAL
Refills: 0 | Status: DISCONTINUED | OUTPATIENT
Start: 2022-09-02 | End: 2022-09-04

## 2022-09-02 RX ORDER — DEXAMETHASONE 0.5 MG/5ML
20 ELIXIR ORAL ONCE
Refills: 0 | Status: COMPLETED | OUTPATIENT
Start: 2022-09-02 | End: 2022-09-02

## 2022-09-02 RX ORDER — DIPHENHYDRAMINE HCL 50 MG
50 CAPSULE ORAL ONCE
Refills: 0 | Status: COMPLETED | OUTPATIENT
Start: 2022-09-02 | End: 2022-09-02

## 2022-09-02 RX ORDER — METOPROLOL TARTRATE 50 MG
25 TABLET ORAL DAILY
Refills: 0 | Status: DISCONTINUED | OUTPATIENT
Start: 2022-09-03 | End: 2022-09-07

## 2022-09-02 RX ORDER — ACETAMINOPHEN 500 MG
650 TABLET ORAL ONCE
Refills: 0 | Status: COMPLETED | OUTPATIENT
Start: 2022-09-02 | End: 2022-09-02

## 2022-09-02 RX ADMIN — Medication 400 MILLIGRAM(S): at 17:54

## 2022-09-02 RX ADMIN — Medication 650 MILLIGRAM(S): at 08:46

## 2022-09-02 RX ADMIN — Medication 2: at 21:18

## 2022-09-02 RX ADMIN — SEVELAMER CARBONATE 800 MILLIGRAM(S): 2400 POWDER, FOR SUSPENSION ORAL at 12:14

## 2022-09-02 RX ADMIN — Medication 400 MILLIGRAM(S): at 05:52

## 2022-09-02 RX ADMIN — OBINUTUZUMAB 900 MILLIGRAM(S): 1000 INJECTION, SOLUTION, CONCENTRATE INTRAVENOUS at 09:55

## 2022-09-02 RX ADMIN — ATORVASTATIN CALCIUM 80 MILLIGRAM(S): 80 TABLET, FILM COATED ORAL at 21:17

## 2022-09-02 RX ADMIN — CHLORHEXIDINE GLUCONATE 1 APPLICATION(S): 213 SOLUTION TOPICAL at 05:53

## 2022-09-02 RX ADMIN — Medication 25 MILLIGRAM(S): at 21:17

## 2022-09-02 RX ADMIN — HEPARIN SODIUM 2200 UNIT(S)/HR: 5000 INJECTION INTRAVENOUS; SUBCUTANEOUS at 05:37

## 2022-09-02 RX ADMIN — Medication 25 MILLIGRAM(S): at 05:55

## 2022-09-02 RX ADMIN — BUMETANIDE 2 MILLIGRAM(S): 0.25 INJECTION INTRAMUSCULAR; INTRAVENOUS at 05:59

## 2022-09-02 RX ADMIN — Medication 1 TABLET(S): at 12:11

## 2022-09-02 RX ADMIN — Medication 81 MILLIGRAM(S): at 12:11

## 2022-09-02 RX ADMIN — CHLORHEXIDINE GLUCONATE 1 APPLICATION(S): 213 SOLUTION TOPICAL at 12:11

## 2022-09-02 RX ADMIN — HEPARIN SODIUM 0 UNIT(S)/HR: 5000 INJECTION INTRAVENOUS; SUBCUTANEOUS at 15:04

## 2022-09-02 RX ADMIN — HEPARIN SODIUM 2200 UNIT(S)/HR: 5000 INJECTION INTRAVENOUS; SUBCUTANEOUS at 07:43

## 2022-09-02 RX ADMIN — HEPARIN SODIUM 6500 UNIT(S): 5000 INJECTION INTRAVENOUS; SUBCUTANEOUS at 05:46

## 2022-09-02 RX ADMIN — Medication 110 MILLIGRAM(S): at 08:45

## 2022-09-02 RX ADMIN — Medication 2: at 12:12

## 2022-09-02 RX ADMIN — Medication 2: at 08:16

## 2022-09-02 RX ADMIN — Medication 100 MILLIGRAM(S): at 05:52

## 2022-09-02 RX ADMIN — SEVELAMER CARBONATE 800 MILLIGRAM(S): 2400 POWDER, FOR SUSPENSION ORAL at 17:55

## 2022-09-02 RX ADMIN — Medication 12.5 MILLIGRAM(S): at 05:52

## 2022-09-02 RX ADMIN — Medication 100 MILLIGRAM(S): at 12:11

## 2022-09-02 RX ADMIN — HEPARIN SODIUM 1900 UNIT(S)/HR: 5000 INJECTION INTRAVENOUS; SUBCUTANEOUS at 19:24

## 2022-09-02 RX ADMIN — Medication 58 MILLIGRAM(S): at 14:12

## 2022-09-02 RX ADMIN — BUMETANIDE 2 MILLIGRAM(S): 0.25 INJECTION INTRAMUSCULAR; INTRAVENOUS at 14:26

## 2022-09-02 RX ADMIN — Medication 2: at 17:54

## 2022-09-02 RX ADMIN — Medication 25 MILLIGRAM(S): at 14:26

## 2022-09-02 RX ADMIN — HEPARIN SODIUM 1900 UNIT(S)/HR: 5000 INJECTION INTRAVENOUS; SUBCUTANEOUS at 16:19

## 2022-09-02 RX ADMIN — Medication 12.5 MILLIGRAM(S): at 17:54

## 2022-09-02 RX ADMIN — Medication 50 MILLIGRAM(S): at 08:46

## 2022-09-02 NOTE — PROGRESS NOTE ADULT - ASSESSMENT
72M with significant history of HTN, CKD stage II, complete heart block (PPM in place), HLD, HFrEF (45% in 2019; 20% during this admission), and DLBCL (tx with R-CHOP in 2003, with relapse in 2009 treated now with multiple areas of palpable lymphadenopathy with biopsies shown to be at least grade IIIA follicular lymphoma. Patient admitted with tumor lysis syndrome, which was treated with rasburicase and patient developed hemolysis due to G6PD deficiency.     Surgical Oncology consulted for surgical biopsy for definite diagnosis.    PLAN:    - no acute surgical intervention indicated    - pt will likely require left back mass biopsy during this admission to further direct care   - new acute on chronic stroke indicated in head CT, please consult neurology for optimization /stratification of procedure  - Will schedule OR for biopsy on Wednesday(9/7/2022) if optimized from cardiology, neurology and medicine team, Please document medical/cardiac/neurology  clearance.  - Plan discussed with Attending, Dr. Tavarez       [facility]  [id number]

## 2022-09-02 NOTE — PROGRESS NOTE ADULT - SUBJECTIVE AND OBJECTIVE BOX
Surgery Progress Note    INTERVAl/SUBJECTIVE: No acute event overnight. Patient seen and examined in am rounds, found to be without acute distress.      Vital Signs Last 24 Hrs  T(C): 36.7 (02 Sep 2022 20:06), Max: 36.7 (02 Sep 2022 20:06)  T(F): 98.1 (02 Sep 2022 20:06), Max: 98.1 (02 Sep 2022 20:06)  HR: 93 (02 Sep 2022 20:06) (68 - 101)  BP: 135/73 (02 Sep 2022 20:06) (112/56 - 142/67)  BP(mean): --  RR: 18 (02 Sep 2022 20:06) (18 - 19)  SpO2: 97% (02 Sep 2022 20:06) (97% - 100%)    Parameters below as of 02 Sep 2022 20:06  Patient On (Oxygen Delivery Method): room air        Physical Exam:  General: Appears well, NAD  CHEST: breathing comfortably  CV: appears well perfused  Abdomen: soft, nontender, nondistended, no rebound or guarding  Extremities: Grossly symmetric    LABS:                        10.1   13.70 )-----------( 285      ( 02 Sep 2022 05:09 )             33.3     02    144  |  105  |  85<H>  ----------------------------<  241<H>  3.5   |  26  |  1.70<H>    Ca    8.8      02 Sep 2022 16:33  Phos  5.2       Mg     1.8         TPro  7.2  /  Alb  3.5  /  TBili  1.2  /  DBili  x   /  AST  17  /  ALT  15  /  AlkPhos  144<H>  02    PTT - ( 02 Sep 2022 13:04 )  PTT:>200.0 sec  Urinalysis Basic - ( 01 Sep 2022 12:01 )    Color: Light Yellow / Appearance: Clear / S.011 / pH: x  Gluc: x / Ketone: Negative  / Bili: Negative / Urobili: Negative   Blood: x / Protein: Negative / Nitrite: Negative   Leuk Esterase: Negative / RBC: x / WBC x   Sq Epi: x / Non Sq Epi: x / Bacteria: x        INs and OUTs:    22 @ 07:01  -  22 @ 07:00  --------------------------------------------------------  IN: 1398.7 mL / OUT: 2000 mL / NET: -601.3 mL    22 @ 07:  -  22 @ 21:36  --------------------------------------------------------  IN: 1374.9 mL / OUT: 2060 mL / NET: -685.1 mL

## 2022-09-02 NOTE — PROGRESS NOTE ADULT - ASSESSMENT
72 year old male with significant history of HTN, CKD stage III, complete heart block (PPM in place), HLD, HFrEF (45% in 2019), and DLBCL (tx with R-CHOP in 2003, with relapse in 2009 treated with BR) now with recently diagnosed grade 3 follicular lymphoma on 8/23 who presents with anemia and SOB, patient was pending a surgical biopsy on 8/25. However, pt was found to be in TLS with associated nausea and fatigue on 8/23; at this time rasburicase was started and lowered the uric acid from 13 to 6, however, pt developed rasburicase-triggered G6PD hemolytic anemia with associated jaundice and dark colored urine as well as hemoglobin of 5.7 and hypotension on 8/26 requiring further evaluation. During hosp pt had CHF as well. started on iv bumex.  had echo revealing e 20%.     1- CKD III   2- CHF   3- Tumor lysis   4- HTN      creatinine steady   bumex 2mg iv bid   given LDH seems to have been steady range as well as haptoglobin in normal range; suspect he is at baseline hemolysis from his underlying disease   hypokalemia, kcl supplemented  trend k+  cont hydralazine 25 mg tid   allopurinol 300 mg daily   strict I/O  keep O> I   trend creatinine and electrolytes daily

## 2022-09-02 NOTE — PROVIDER CONTACT NOTE (OTHER) - ASSESSMENT
Pt had 6 beats WCT on tele. Pt stable & asymptomatic w/no c/o pain, CP, heart palpitations, SOB/trouble breathing, HA. VSS.

## 2022-09-02 NOTE — PROGRESS NOTE ADULT - PROBLEM SELECTOR PLAN 4
- h/o grade 3 follicular lymphoma, pt was pending surgical biopsy on 8/23, however in heme/onc outpt labs s/f TLS with uric acid 13.7, pt was started rasburicase on 8/23 and developed rasburicase-triggered G6PD hemolysis with hgb 5.7 on 8/26  - UA 6.4 from 13.7 s/p rasburicase on 8/23, now on allopurinol 100mg   - follow-up with heme:      - Started prednisone 100mg daily x5 (day 4/5) days with intent to start Obinutuzumab-COEP inpatient 9/1      - PICC placed (8/30) however patient removed it, PICC placed again (8/31) and ready for chemotherapy initiation      - trend TLS labs (CMP, Mg, Phos, LDH, Uric Acid) q12hrs as patient is high risk at 6am and 6pm daily      - s/p 1st session 9/1, pending 2nd session 9/2  - Surgery consulted for biopsy for definitive diagnosis      - can consider biopsy under local anesthesia, ongoing discussion involving cards, as per cards no need for medical clearance for local biopsy, pending biopsy possibly tomorrow   - cardiology consult      - as per HF team, patient still volume overloaded and not cleared medically for biopsy under general anesthesia      - increased hydralazine to 25mg TID with verification from neuro, drug can cause potential hypoperfusion of brain      - c/w current management with diuresis, metoprolol, hydralazine      - pending bladder scan to r/o obstruction  - cardiology oncology consult      - TTE done, read pending      - can consider ACE vs Estresto today with improving SCr      - SGLT2 is possible

## 2022-09-02 NOTE — PROGRESS NOTE ADULT - PROBLEM SELECTOR PLAN 2
presenting with G6PD hemolysis 2/2 rasburicase (low hapto, high LDH/alk/bili) s/p rasburicase for TLS on 8/23 w/hgb 5 on 8/26 (hgb 9 on 8/23)  - pt s/p total 4 units pRBC  - trend hgb q12h for now, transfuse to maintain hgb>7  - G6PD level stable following multiple transfusions   - will repeat if rasburicase given as above

## 2022-09-02 NOTE — PROVIDER CONTACT NOTE (CRITICAL VALUE NOTIFICATION) - BACKGROUND
Pt admitted for tumor lysis syndrome. PMH of impaired memory, non-Hodgkin's lymphoma, pleural effusion, etc.

## 2022-09-02 NOTE — PROGRESS NOTE ADULT - SUBJECTIVE AND OBJECTIVE BOX
INTERVAL HPI/OVERNIGHT EVENTS:  Patient S&E at bedside. No o/n events. Patient tolerated D1 of chemotherapy well yesterday.     VITAL SIGNS:  T(F): 97.5 (22 @ 08:20)  HR: 78 (22 @ 08:20)  BP: 112/56 (22 @ 08:20)  RR: 19 (22 @ 08:20)  SpO2: 98% (22 @ 08:20)  Wt(kg): --    PHYSICAL EXAM:    Constitutional: NAD  Eyes: EOMI, sclera non-icteric  Neck: supple  Respiratory: CTAB, no wheezes or crackles   Cardiovascular: RRR  Gastrointestinal: soft, NTND, + BS  Extremities: no cyanosis, clubbing or edema   Neurological: awake and alert      MEDICATIONS  (STANDING):  acyclovir   Oral Tab/Cap 400 milliGRAM(s) Oral two times a day  allopurinol 100 milliGRAM(s) Oral daily  aspirin enteric coated 81 milliGRAM(s) Oral daily  atorvastatin 80 milliGRAM(s) Oral at bedtime  buMETAnide Injectable 2 milliGRAM(s) IV Push two times a day  chlorhexidine 2% Cloths 1 Application(s) Topical daily  chlorhexidine 4% Liquid 1 Application(s) Topical <User Schedule>  dextrose 5%. 1000 milliLiter(s) (100 mL/Hr) IV Continuous <Continuous>  dextrose 5%. 1000 milliLiter(s) (50 mL/Hr) IV Continuous <Continuous>  dextrose 50% Injectable 25 Gram(s) IV Push once  dextrose 50% Injectable 12.5 Gram(s) IV Push once  dextrose 50% Injectable 25 Gram(s) IV Push once  etoposide (TOPOSAR) IVPB (eMAR) 58 milliGRAM(s) IV Intermittent every 24 hours  glucagon  Injectable 1 milliGRAM(s) IntraMuscular once  heparin  Infusion.  Unit(s)/Hr (14 mL/Hr) IV Continuous <Continuous>  hydrALAZINE 25 milliGRAM(s) Oral every 8 hours  insulin lispro (ADMELOG) corrective regimen sliding scale   SubCutaneous Before meals and at bedtime  metoprolol tartrate 12.5 milliGRAM(s) Oral every 12 hours  multivitamin 1 Tablet(s) Oral daily  obinutuzumab IVPB (eMAR) 900 milliGRAM(s) IV Intermittent once  sevelamer carbonate 800 milliGRAM(s) Oral three times a day with meals    MEDICATIONS  (PRN):  dextrose Oral Gel 15 Gram(s) Oral once PRN Blood Glucose LESS THAN 70 milliGRAM(s)/deciliter  heparin   Injectable 6500 Unit(s) IV Push every 6 hours PRN For aPTT less than 40  heparin   Injectable 3000 Unit(s) IV Push every 6 hours PRN For aPTT between 40 - 57  melatonin 3 milliGRAM(s) Oral at bedtime PRN Insomnia  sodium chloride 0.9% lock flush 10 milliLiter(s) IV Push every 1 hour PRN Pre/post blood products, medications, blood draw, and to maintain line patency      Allergies    No Known Allergies    Intolerances        LABS:                        10.1   13.70 )-----------( 285      ( 02 Sep 2022 05:09 )             33.3     -    143  |  104  |  90<H>  ----------------------------<  200<H>  3.7   |  24  |  1.72<H>    Ca    9.2      02 Sep 2022 05:09  Phos  5.6       Mg     2.0         TPro  8.1  /  Alb  3.7  /  TBili  1.4<H>  /  DBili  x   /  AST  20  /  ALT  19  /  AlkPhos  154<H>  09-02    PTT - ( 02 Sep 2022 05:09 )  PTT:35.2 sec  Urinalysis Basic - ( 01 Sep 2022 12:01 )    Color: Light Yellow / Appearance: Clear / S.011 / pH: x  Gluc: x / Ketone: Negative  / Bili: Negative / Urobili: Negative   Blood: x / Protein: Negative / Nitrite: Negative   Leuk Esterase: Negative / RBC: x / WBC x   Sq Epi: x / Non Sq Epi: x / Bacteria: x        RADIOLOGY & ADDITIONAL TESTS:  Studies reviewed.

## 2022-09-02 NOTE — PROGRESS NOTE ADULT - ASSESSMENT
72M hx DLBCL (tx with R-CHOP in 2003, with relapse in 2009 treated with BR) now with multiple areas of palpable lymphadenopathy with biopsies shown to be at least grade IIIA follicular lymphoma. Has had significant weight loss >20lbs in the last 6 months. Also noted to have an IgG lambda, IgG Kappa, and IgM Lambda monoclonal gammopathies. Prior to being sent to the ED his labs were suggestive of TLS with UA 13.7 s/p 3mg rasburicase on 8/23.      #Follicular Lymphoma  #Hemolytic Anemia  -Follows with Dr. Cordova at [facility].   -Originally diagnosed in 2003 s/p RCHOP with relapse in 2009 s/p BR. Recent outpatient PET/CT 8/2022 showed concern for POD with new LAD and subcutaneous tissue nodules  ---s/p FNA L cervical LN in June 2022 with path c/w grade 3A follicular lymphoma positive for BCL6 (3q27) breakpoint translocation and negative for MYC Rearrangement or BCL2-IGH gene rearrangement [translocation t(14;18) present in ~ 85% of [place]].   ---Planned for excisional biopsy outpatient to confirm suspected 3B FL vs. transformation, but unable to be completed due to current admission. Patient presented with spontaneous TLS, now requiring inpatient treatment. Ideally, we would require an excisional biopsy to confirm the suspected diagnosis. However, patient is not optimized medically for an excisional biopsy inpatient, so we will treat for a presumptive diagnosis of relapsed FL with regimen below so as not to delay treatment:  -Patient is on treatment with a modified regimen of mini-COEP plus Obinutuzumab. Started on 9/1/22. Today is D2:  ------Cyclophosphamide 400 mg/m² on D1 (9/1)  ------Etoposide 40% dose reduced to 30 mg/m2 IV on D1-D3 of each cycle (9/1-9/3)  ------Vincristine 2mg (flat dose) on D1 (9/1)  ------Prednisone 100mg PO daily x5 days (started on 8/29-9/2)  ------Obinutuzumab 100mg test dose on D1 + 900mg full dose on D2 (9/1, (9/2)  -Excisional biopsy should be completed as soon as possible once patient is medically cleared, but as above, we will still plan to proceed with treatment.    #G6PD Deficiency  #Concern for TLS  -Patient is G6PD Deficient (G6PD 4.8) and is very high risk for TLS in the s/o spontaneous TLS on admission. He may require further doses of Rasburicase which can cause hemolytic anemia in the setting of G6PD Deficiency  -He is s/p rasburicase on 8/23 outpatient with development of hemolytic anemia and is now s/p 4u pRBC on this admission--repeat G6PD on 8/27 following transfusions now 11.1  -As we are initiating treatment today, we are anticipating patient may develop TLS and we may have to initiate HD as we cannot safely administer rasburicase  -Please check TLS labs BID. Please ensure to check CMP, Mg, Phos, LDH, Uric acid.  ---If Uric Acid is >8, please give 3mg Rasburicase. Please contact the Hematology fellow on call prior to administration of Rasburicase. Please call the Hematology fellow on call if there are any concerning findings on the TLS labs.  ---9/2: Uric Acid 8.2 this AM. We will hold Rasburicase and treat with Obinutuzumab + Etoposide. We will recheck TLS labs at 2pm and give Rasburicase at that time if needed.  -Nephrology consulted. Appreciate recommendations      Brittaney Ambrosio MD  Hematology/Oncology Fellow, PGY-6  Pager: [phone]  After 5pm and on weekends please page on-call fellow   72M hx DLBCL (tx with R-CHOP in 2003, with relapse in 2009 treated with BR) now with multiple areas of palpable lymphadenopathy with biopsies shown to be at least grade IIIA follicular lymphoma. Has had significant weight loss >20lbs in the last 6 months. Also noted to have an IgG lambda, IgG Kappa, and IgM Lambda monoclonal gammopathies. Prior to being sent to the ED his labs were suggestive of TLS with UA 13.7 s/p 3mg rasburicase on 8/23.      #Follicular Lymphoma  #Hemolytic Anemia  -Follows with Dr. Cordova at [facility].   -Originally diagnosed in 2003 s/p RCHOP with relapse in 2009 s/p BR. Recent outpatient PET/CT 8/2022 showed concern for POD with new LAD and subcutaneous tissue nodules  ---s/p FNA L cervical LN in June 2022 with path c/w grade 3A follicular lymphoma positive for BCL6 (3q27) breakpoint translocation and negative for MYC Rearrangement or BCL2-IGH gene rearrangement [translocation t(14;18) present in ~ 85% of [place]].   ---Planned for excisional biopsy outpatient to confirm suspected 3B FL vs. transformation, but unable to be completed due to current admission. Patient presented with spontaneous TLS, now requiring inpatient treatment. Ideally, we would require an excisional biopsy to confirm the suspected diagnosis. However, patient is not optimized medically for an excisional biopsy inpatient, so we will treat for a presumptive diagnosis of relapsed FL with regimen below so as not to delay treatment:  -Patient is on treatment with a modified regimen of mini-COEP plus Obinutuzumab. Started on 9/1/22. Today is D2:  ------Cyclophosphamide 400 mg/m² on D1 (9/1)  ------Etoposide 40% dose reduced to 30 mg/m2 IV on D1-D3 of each cycle (9/1-9/3)  ------Vincristine 2mg (flat dose) on D1 (9/1)  ------Prednisone 100mg PO daily x5 days (started on 8/29-9/2)  ------Obinutuzumab 100mg test dose on D1 + 900mg full dose on D2 (9/1, (9/2)  -Excisional biopsy should be completed as soon as possible once patient is medically cleared, but as above, we will still plan to proceed with treatment.    #G6PD Deficiency  #Concern for TLS  -Patient is G6PD Deficient (G6PD 4.8) and is very high risk for TLS in the s/o spontaneous TLS on admission. He may require further doses of Rasburicase which can cause hemolytic anemia in the setting of G6PD Deficiency  -He is s/p rasburicase on 8/23 outpatient with development of hemolytic anemia and is now s/p 4u pRBC on this admission--repeat G6PD on 8/27 following transfusions now 11.1  -As we are initiating treatment today, we are anticipating patient may develop TLS and we may have to initiate HD as we cannot safely administer rasburicase  -Please check TLS labs BID. Please ensure to check CMP, Mg, Phos, LDH, Uric acid.  ---If Uric Acid is >8, please give 3mg Rasburicase. If Rasburicase is given, please check CBC BID as patient is high risk for hemolysis. Please contact the Hematology fellow on call prior to administration of Rasburicase. Please call the Hematology fellow on call if there are any concerning findings on the TLS labs.  ---9/2: Uric Acid 8.2 this AM. We will hold Rasburicase and treat with Obinutuzumab + Etoposide. We will recheck TLS labs at 2pm and give Rasburicase at that time if needed.  -Nephrology consulted. Appreciate recommendations      Brittaney Ambrosio MD  Hematology/Oncology Fellow, PGY-6  Pager: [phone]  After 5pm and on weekends please page on-call fellow   72M hx DLBCL (tx with R-CHOP in 2003, with relapse in 2009 treated with BR) now with multiple areas of palpable lymphadenopathy with biopsies shown to be at least grade IIIA follicular lymphoma. Has had significant weight loss >20lbs in the last 6 months. Also noted to have an IgG lambda, IgG Kappa, and IgM Lambda monoclonal gammopathies. Prior to being sent to the ED his labs were suggestive of TLS with UA 13.7 s/p 3mg rasburicase on 8/23.      #Follicular Lymphoma  #Hemolytic Anemia  -Follows with Dr. Cordova at [facility].   -Originally diagnosed in 2003 s/p RCHOP with relapse in 2009 s/p BR. Recent outpatient PET/CT 8/2022 showed concern for POD with new LAD and subcutaneous tissue nodules  ---s/p FNA L cervical LN in June 2022 with path c/w grade 3A follicular lymphoma positive for BCL6 (3q27) breakpoint translocation and negative for MYC Rearrangement or BCL2-IGH gene rearrangement [translocation t(14;18) present in ~ 85% of FL].   ---Planned for excisional biopsy outpatient to confirm suspected 3B FL vs. transformation, but unable to be completed due to current admission. Patient presented with spontaneous TLS, now requiring inpatient treatment. Ideally, we would require an excisional biopsy to confirm the suspected diagnosis. However, patient is not optimized medically for an excisional biopsy inpatient, so we will treat for a presumptive diagnosis of relapsed FL with regimen below so as not to delay treatment:  -Patient is on treatment with a modified regimen of mini-COEP plus Obinutuzumab. Started on 9/1/22. Today is D2:  ------Cyclophosphamide 400 mg/m² on D1 (9/1)  ------Etoposide 40% dose reduced to 30 mg/m2 IV on D1-D3 of each cycle (9/1-9/3)  ------Vincristine 2mg (flat dose) on D1 (9/1)  ------Prednisone 100mg PO daily x5 days (started on 8/29-9/2)  ------Obinutuzumab 100mg test dose on D1 + 900mg full dose on D2 (9/1, (9/2)  -Excisional biopsy should be completed as soon as possible once patient is medically cleared, but as above, we will still plan to proceed with treatment.    #G6PD Deficiency  #Concern for TLS  -Patient is G6PD Deficient (G6PD 4.8) and is very high risk for TLS in the s/o spontaneous TLS on admission. He may require further doses of Rasburicase which can cause hemolytic anemia in the setting of G6PD Deficiency  -He is s/p rasburicase on 8/23 outpatient with development of hemolytic anemia and is now s/p 4u pRBC on this admission--repeat G6PD on 8/27 following transfusions now 11.1  -As we are initiating treatment today, we are anticipating patient may develop TLS and we may have to initiate HD as we cannot safely administer rasburicase  -Please check TLS labs BID. Please ensure to check CMP, Mg, Phos, LDH, Uric acid.  ---If Uric Acid is >10, please give 3mg Rasburicase. If Rasburicase is given, please check CBC BID as patient is high risk for hemolysis. Please contact the Hematology fellow on call prior to administration of Rasburicase. Please call the Hematology fellow on call if there are any concerning findings on the TLS labs.  ---9/2: Uric Acid 8.2 this AM. We will hold Rasburicase and treat with Obinutuzumab + Etoposide. We will recheck TLS labs at 2pm and give Rasburicase at that time if needed.  -Nephrology consulted. Appreciate recommendations      Brittaney Ambrosio MD  Hematology/Oncology Fellow, PGY-6  Pager: [phone]  After 5pm and on weekends please page on-call fellow

## 2022-09-02 NOTE — PROGRESS NOTE ADULT - SUBJECTIVE AND OBJECTIVE BOX
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 Underwent first session of chemotherapy yesterday with no adverse events.   - Pt seen and examined at bedside.     Additional ROS (if any):    MEDICATIONS  (STANDING):  acetaminophen     Tablet .. 650 milliGRAM(s) Oral once  acyclovir   Oral Tab/Cap 400 milliGRAM(s) Oral two times a day  allopurinol 100 milliGRAM(s) Oral daily  aspirin enteric coated 81 milliGRAM(s) Oral daily  atorvastatin 80 milliGRAM(s) Oral at bedtime  buMETAnide Injectable 2 milliGRAM(s) IV Push two times a day  chlorhexidine 2% Cloths 1 Application(s) Topical daily  chlorhexidine 4% Liquid 1 Application(s) Topical <User Schedule>  dexAMETHasone  IVPB 20 milliGRAM(s) IV Intermittent once  dextrose 5%. 1000 milliLiter(s) (100 mL/Hr) IV Continuous <Continuous>  dextrose 5%. 1000 milliLiter(s) (50 mL/Hr) IV Continuous <Continuous>  dextrose 50% Injectable 25 Gram(s) IV Push once  dextrose 50% Injectable 12.5 Gram(s) IV Push once  dextrose 50% Injectable 25 Gram(s) IV Push once  diphenhydrAMINE 50 milliGRAM(s) Oral once  etoposide (TOPOSAR) IVPB (eMAR) 58 milliGRAM(s) IV Intermittent every 24 hours  glucagon  Injectable 1 milliGRAM(s) IntraMuscular once  heparin  Infusion.  Unit(s)/Hr (14 mL/Hr) IV Continuous <Continuous>  hydrALAZINE 25 milliGRAM(s) Oral every 8 hours  insulin lispro (ADMELOG) corrective regimen sliding scale   SubCutaneous Before meals and at bedtime  metoprolol tartrate 12.5 milliGRAM(s) Oral every 12 hours  multivitamin 1 Tablet(s) Oral daily  obinutuzumab IVPB (eMAR) 900 milliGRAM(s) IV Intermittent once    MEDICATIONS  (PRN):  dextrose Oral Gel 15 Gram(s) Oral once PRN Blood Glucose LESS THAN 70 milliGRAM(s)/deciliter  heparin   Injectable 6500 Unit(s) IV Push every 6 hours PRN For aPTT less than 40  heparin   Injectable 3000 Unit(s) IV Push every 6 hours PRN For aPTT between 40 - 57  melatonin 3 milliGRAM(s) Oral at bedtime PRN Insomnia  sodium chloride 0.9% lock flush 10 milliLiter(s) IV Push every 1 hour PRN Pre/post blood products, medications, blood draw, and to maintain line patency          PHYSICAL EXAM:  Vital Signs Last 24 Hrs  T(C): 35.6 (02 Sep 2022 04:45), Max: 36.4 (01 Sep 2022 11:46)  T(F): 96.1 (02 Sep 2022 04:45), Max: 97.6 (01 Sep 2022 15:49)  HR: 82 (02 Sep 2022 04:31) (76 - 102)  BP: 133/79 (02 Sep 2022 04:31) (110/63 - 135/78)  BP(mean): --  RR: 18 (02 Sep 2022 04:31) (18 - 18)  SpO2: 100% (02 Sep 2022 04:31) (96% - 100%)    Parameters below as of 02 Sep 2022 04:31  Patient On (Oxygen Delivery Method): room air        I&O's Summary    01 Sep 2022 07:01  -  02 Sep 2022 07:00  --------------------------------------------------------  IN: 1376.7 mL / OUT: 2000 mL / NET: -623.3 mL        General: NAD, non-toxic appearing   HEENT: PERRLA, EOMi, no scleral icterus  CV: RRR, normal S1 and S2, no m/r/g  Lungs: normal respiratory effort. CTAB, no wheezes, rales, or rhonchi  Abd: soft, nontender, nondistended  Ext: no edema, 2+ peripheral pulses   Pysch: AAOx3, appropriate affect   Neuro: grossly non-focal, moving all extremities spontaneously   Skin: no rashes or lesions     LABS:  CAPILLARY BLOOD GLUCOSE      POCT Blood Glucose.: 199 mg/dL (01 Sep 2022 21:13)  POCT Blood Glucose.: 234 mg/dL (01 Sep 2022 16:37)  POCT Blood Glucose.: 229 mg/dL (01 Sep 2022 11:45)  POCT Blood Glucose.: 127 mg/dL (01 Sep 2022 07:24)                              10.1   13.70 )-----------( 285      ( 02 Sep 2022 05:09 )             33.3       WBC Trend: 13.70<--, 13.41<--, 16.34<--  Hb Trend: 10.1<--, 9.2<--, 9.0<--, 8.7<--, 8.6<--    09-02    143  |  104  |  90<H>  ----------------------------<  200<H>  3.7   |  24  |  1.72<H>    Ca    9.2      02 Sep 2022 05:09  Phos  5.6     0902  Mg     2.0         TPro  8.1  /  Alb  3.7  /  TBili  1.4<H>  /  DBili  x   /  AST  20  /  ALT  19  /  AlkPhos  154<H>  0902    PTT - ( 02 Sep 2022 05:09 )  PTT:35.2 sec      Urinalysis Basic - ( 01 Sep 2022 12:01 )    Color: Light Yellow / Appearance: Clear / S.011 / pH: x  Gluc: x / Ketone: Negative  / Bili: Negative / Urobili: Negative   Blood: x / Protein: Negative / Nitrite: Negative   Leuk Esterase: Negative / RBC: x / WBC x   Sq Epi: x / Non Sq Epi: x / Bacteria: x            RADIOLOGY & ADDITIONAL TESTS: Reviewed Progress Note    08-27-22 (6d)    Patient is a 72y old  Male who presents with a 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 Underwent first session of chemotherapy yesterday with no adverse events.   - Pt seen and examined at bedside. Somnolent but otherwise no other complaints. Eating well but still confused.     Additional ROS (if any):    MEDICATIONS  (STANDING):  acetaminophen     Tablet .. 650 milliGRAM(s) Oral once  acyclovir   Oral Tab/Cap 400 milliGRAM(s) Oral two times a day  allopurinol 100 milliGRAM(s) Oral daily  aspirin enteric coated 81 milliGRAM(s) Oral daily  atorvastatin 80 milliGRAM(s) Oral at bedtime  buMETAnide Injectable 2 milliGRAM(s) IV Push two times a day  chlorhexidine 2% Cloths 1 Application(s) Topical daily  chlorhexidine 4% Liquid 1 Application(s) Topical <User Schedule>  dexAMETHasone  IVPB 20 milliGRAM(s) IV Intermittent once  dextrose 5%. 1000 milliLiter(s) (100 mL/Hr) IV Continuous <Continuous>  dextrose 5%. 1000 milliLiter(s) (50 mL/Hr) IV Continuous <Continuous>  dextrose 50% Injectable 25 Gram(s) IV Push once  dextrose 50% Injectable 12.5 Gram(s) IV Push once  dextrose 50% Injectable 25 Gram(s) IV Push once  diphenhydrAMINE 50 milliGRAM(s) Oral once  etoposide (TOPOSAR) IVPB (eMAR) 58 milliGRAM(s) IV Intermittent every 24 hours  glucagon  Injectable 1 milliGRAM(s) IntraMuscular once  heparin  Infusion.  Unit(s)/Hr (14 mL/Hr) IV Continuous <Continuous>  hydrALAZINE 25 milliGRAM(s) Oral every 8 hours  insulin lispro (ADMELOG) corrective regimen sliding scale   SubCutaneous Before meals and at bedtime  metoprolol tartrate 12.5 milliGRAM(s) Oral every 12 hours  multivitamin 1 Tablet(s) Oral daily  obinutuzumab IVPB (eMAR) 900 milliGRAM(s) IV Intermittent once    MEDICATIONS  (PRN):  dextrose Oral Gel 15 Gram(s) Oral once PRN Blood Glucose LESS THAN 70 milliGRAM(s)/deciliter  heparin   Injectable 6500 Unit(s) IV Push every 6 hours PRN For aPTT less than 40  heparin   Injectable 3000 Unit(s) IV Push every 6 hours PRN For aPTT between 40 - 57  melatonin 3 milliGRAM(s) Oral at bedtime PRN Insomnia  sodium chloride 0.9% lock flush 10 milliLiter(s) IV Push every 1 hour PRN Pre/post blood products, medications, blood draw, and to maintain line patency          PHYSICAL EXAM:  Vital Signs Last 24 Hrs  T(C): 35.6 (02 Sep 2022 04:45), Max: 36.4 (01 Sep 2022 11:46)  T(F): 96.1 (02 Sep 2022 04:45), Max: 97.6 (01 Sep 2022 15:49)  HR: 82 (02 Sep 2022 04:31) (76 - 102)  BP: 133/79 (02 Sep 2022 04:31) (110/63 - 135/78)  BP(mean): --  RR: 18 (02 Sep 2022 04:31) (18 - 18)  SpO2: 100% (02 Sep 2022 04:) (96% - 100%)    Parameters below as of 02 Sep 2022 04:31  Patient On (Oxygen Delivery Method): room air        I&O's Summary    01 Sep 2022 07:01  -  02 Sep 2022 07:00  --------------------------------------------------------  IN: 1376.7 mL / OUT: 2000 mL / NET: -623.3 mL        General: NAD, non-toxic appearing   HEENT: PERRLA, EOMi, no scleral icterus. No JVD.  CV: RRR, normal S1 and S2, holosystolic murmur  Lungs: normal respiratory effort. CTAB, no wheezes, rales, or rhonchi  Abd: soft, nontender, nondistended  Ext: 2+ pitting edema, 2+ peripheral pulses. PICC in place RUE.   Pysch: AAOx1, appropriate affect   Neuro: grossly non-focal, moving all extremities spontaneously   Skin: no rashes     LABS:  CAPILLARY BLOOD GLUCOSE      POCT Blood Glucose.: 199 mg/dL (01 Sep 2022 21:13)  POCT Blood Glucose.: 234 mg/dL (01 Sep 2022 16:37)  POCT Blood Glucose.: 229 mg/dL (01 Sep 2022 11:45)  POCT Blood Glucose.: 127 mg/dL (01 Sep 2022 07:24)                              10.1   13.70 )-----------( 285      ( 02 Sep 2022 05:09 )             33.3       WBC Trend: 13.70<--, 13.41<--, 16.34<--  Hb Trend: 10.1<--, 9.2<--, 9.0<--, 8.7<--, 8.6<--    09-02    143  |  104  |  90<H>  ----------------------------<  200<H>  3.7   |  24  |  1.72<H>    Ca    9.2      02 Sep 2022 05:09  Phos  5.6       Mg     2.0         TPro  8.1  /  Alb  3.7  /  TBili  1.4<H>  /  DBili  x   /  AST  20  /  ALT  19  /  AlkPhos  154<H>  0902    PTT - ( 02 Sep 2022 05:09 )  PTT:35.2 sec      Urinalysis Basic - ( 01 Sep 2022 12:01 )    Color: Light Yellow / Appearance: Clear / S.011 / pH: x  Gluc: x / Ketone: Negative  / Bili: Negative / Urobili: Negative   Blood: x / Protein: Negative / Nitrite: Negative   Leuk Esterase: Negative / RBC: x / WBC x   Sq Epi: x / Non Sq Epi: x / Bacteria: x            RADIOLOGY & ADDITIONAL TESTS: Reviewed

## 2022-09-02 NOTE — ADVANCED PRACTICE NURSE CONSULT - ASSESSMENT
received pt in bed awake alert x2 able to verbalise his needs  Cycle # day 2/2 .Height and weight verified. consent in chart Lab results as per carlos, Dr Bal aware of same. Vital signs stable prior to the start of chemotherapy, see sunrise.   Pt education done re chemo regimen, drug effects and potential side effects, written material provided, pt states understanding as per his ability  Pt  has tolerated day #1 of chemotherapy without side effects. Pt with z picc line,on right upper arm  site free from signs and symptoms of infection, positive blood return obtained. Pre-medicated with tylenol 650mg pox1 , benadryl 50mg pox1, decadron 20mg ivssx1 pt started on obinutuzumab 900mg iv intiated at 1000 at 50mg/hr x30 min pt tolerated 30 min and v/s stable increase rate to 100mg/hr   received pt in bed awake alert x2 able to verbalise his needs  Cycle # day 2/2 .Height and weight verified. consent in chart Lab results as per Dr Mally gonzalez aware of same. Vital signs stable prior to the start of chemotherapy, see sunrise.   Pt education done re chemo regimen, drug effects and potential side effects, written material provided, pt states understanding as per his ability  pt urinating adequately Pt  has tolerated day #1 of chemotherapy without side effects. Pt with DL picc line,on right upper arm  site free from signs and symptoms of infection, positive blood return obtained. Pre-medicated with tylenol 650mg pox1 , benadryl 50mg pox1, decadron 20mg ivssx1 pt started on obinutuzumab 900mg iv intiated at 1000 at 10am  via red port 50mg/hr x30 min pt tolerated 30 min and v/s stable increase rate by 50mg/hr every 30 min  to max rate of 400mg/hr v/s checked every 30 min and stable and documented completed infusion at 1400 with no adverse effects post v/s stable pt started on etoposide Day 2/3 -30mg/m2= 58mg iv intiated at 1415 infuse over 1 hr completed with no adverse effects post v/s stable report given to the area nurse pt labs to be drawn stat after completion of chemotherapy and 2 x day area nurse made aware of the labs

## 2022-09-02 NOTE — PROGRESS NOTE ADULT - PROBLEM SELECTOR PLAN 1
heme/onc      - s/p 1 session chemotherapy: TLS labs significant for uric acid 8.2, phosphorus 5.6      - most recent G6PD level wnl following multiple transfusions of pRBC      - patient is at high risk for TLS but still requires chemotherapy iso lymphoma, comfortable to give rasburicase if needed      - as per note, f/u fellow for uric acid >8, may need rasburicase 3mg --> 9/2 AM labs with uric acid 8.2, as per fellow hold off rasburicase for now       - will f/u TLS labs following second session 9/2 -> will f/u fellow if uric acid level elevated or other concerns on TLS labs       - on allopurinol 100mg   - as per heme/onc patient is at high risk for TLS but still requires chemotherapy iso lymphoma heme/onc      - s/p 1 session chemotherapy: TLS labs significant for uric acid 8.2, phosphorus 5.6      - most recent G6PD level wnl following multiple transfusions of pRBC      - patient is at high risk for TLS but still requires chemotherapy iso lymphoma, comfortable to give rasburicase if needed with improved G6PD levels       - as per note, f/u fellow for uric acid >8, may need rasburicase 3mg --> 9/2 AM labs with uric acid 8.2, as per fellow hold off rasburicase for now       - will f/u TLS labs following second session 9/2 -> will f/u fellow if uric acid level elevated or other concerns on TLS labs       - on allopurinol 100mg   - as per heme/onc patient is at high risk for TLS but still requires chemotherapy iso lymphoma

## 2022-09-02 NOTE — PROGRESS NOTE ADULT - PROBLEM SELECTOR PLAN 3
Has been confused since admission with improvement today (9/1). As per family, this occurs every time the patient is sick with similar symptoms. Considerations include underlying dementia, delirium, vs metabolic causes vs neurological vs malignancy  - AAOx1  - cultures with no growth to date, no acute toxicologies found on labs, no history of cirrhosis with low suspicion for ammonia induced, no indication for renal failure for uremic causes  - CT head non-contrast performed, no official read but as per radiology has acute on chronic of R parietal lobe infarct with no indication of hemorrhagic transformation  - neuro consult       - MRI brain wo contrast, MRA H wo contrast, MRA N w contrast -> will f/u if pacemaker is compatible, will get consent from daughter iso patient mentation       - asa, atorvastatin 80mg      - TTE performed, pending read

## 2022-09-02 NOTE — PROGRESS NOTE ADULT - ATTENDING COMMENTS
The patient has stable vital signs and uric acid at 8 controlled last night post obinutuzumab.  Remains on allopurinol 100 mg PO daily. Today successful completion of Day 2 etoposide infusion and obinutuzumab therapy. We willl ask the weekend team to follow patient

## 2022-09-02 NOTE — PROGRESS NOTE ADULT - SUBJECTIVE AND OBJECTIVE BOX
[facility]   [phone]  RENAL FOLLOW UP NOTE  --------------------------------------------------------------------------------  Chief Complaint:    24 hour events/subjective:    seen earlier   states feels better breathing is better     PAST HISTORY  --------------------------------------------------------------------------------  No significant changes to PMH, PSH, FHx, SHx, unless otherwise noted    ALLERGIES & MEDICATIONS  --------------------------------------------------------------------------------  Allergies    No Known Allergies    Intolerances      Standing Inpatient Medications  acyclovir   Oral Tab/Cap 400 milliGRAM(s) Oral two times a day  allopurinol 100 milliGRAM(s) Oral daily  atorvastatin 80 milliGRAM(s) Oral at bedtime  buMETAnide Injectable 2 milliGRAM(s) IV Push two times a day  chlorhexidine 2% Cloths 1 Application(s) Topical daily  chlorhexidine 4% Liquid 1 Application(s) Topical <User Schedule>  dextrose 5%. 1000 milliLiter(s) IV Continuous <Continuous>  dextrose 5%. 1000 milliLiter(s) IV Continuous <Continuous>  dextrose 50% Injectable 25 Gram(s) IV Push once  dextrose 50% Injectable 12.5 Gram(s) IV Push once  dextrose 50% Injectable 25 Gram(s) IV Push once  etoposide (TOPOSAR) IVPB (eMAR) 58 milliGRAM(s) IV Intermittent every 24 hours  glucagon  Injectable 1 milliGRAM(s) IntraMuscular once  heparin  Infusion.  Unit(s)/Hr IV Continuous <Continuous>  hydrALAZINE 25 milliGRAM(s) Oral every 8 hours  insulin lispro (ADMELOG) corrective regimen sliding scale   SubCutaneous Before meals and at bedtime  multivitamin 1 Tablet(s) Oral daily  sevelamer carbonate 800 milliGRAM(s) Oral three times a day with meals    PRN Inpatient Medications  dextrose Oral Gel 15 Gram(s) Oral once PRN  heparin   Injectable 6500 Unit(s) IV Push every 6 hours PRN  heparin   Injectable 3000 Unit(s) IV Push every 6 hours PRN  melatonin 3 milliGRAM(s) Oral at bedtime PRN  sodium chloride 0.9% lock flush 10 milliLiter(s) IV Push every 1 hour PRN      REVIEW OF SYSTEMS  --------------------------------------------------------------------------------    Gen: denies  fevers/chills,  CVS: denies chest pain/palpitations  Resp: denies SOB/Cough  GI: Denies N/V/Abd pain  : Denies dysuria/oliguria/hematuria      VITALS/PHYSICAL EXAM  --------------------------------------------------------------------------------  T(C): 36.7 (09-02-22 @ 20:06), Max: 36.7 (09-02-22 @ 20:06)  HR: 93 (09-02-22 @ 20:06) (68 - 101)  BP: 135/73 (09-02-22 @ 20:06) (112/56 - 142/67)  RR: 18 (09-02-22 @ 20:06) (18 - 19)  SpO2: 97% (09-02-22 @ 20:06) (97% - 100%)  Wt(kg): --        09-01-22 @ 07:01  -  09-02-22 @ 07:00  --------------------------------------------------------  IN: 1398.7 mL / OUT: 2000 mL / NET: -601.3 mL    09-02-22 @ 07:01  -  09-02-22 @ 20:36  --------------------------------------------------------  IN: 1236.9 mL / OUT: 1860 mL / NET: -623.1 mL      Physical Exam:  	                Gen: thin pale appearing   	Pulm: decrease bs  no rales or ronchi or wheezing  	CV: Mild JVD. RRR, S1S2; no rub  	Abd: +BS, soft, nontender/nondistended  	: No suprapubic tenderness  	UE: Warm, no cyanosis  no clubbing,  no edema  	LE: Warm, no cyanosis  no clubbing, no edema  	Neuro: alert and oriented. speech coherent       LABS/STUDIES  --------------------------------------------------------------------------------              10.1   13.70 >-----------<  285      [09-02-22 @ 05:09]              33.3     144  |  105  |  85  ----------------------------<  241      [09-02-22 @ 16:33]  3.5   |  26  |  1.70        Ca     8.8     [09-02-22 @ 16:33]      Mg     1.8     [09-02-22 @ 16:33]      Phos  5.2     [09-02-22 @ 16:33]    TPro  7.2  /  Alb  3.5  /  TBili  1.2  /  DBili  x   /  AST  17  /  ALT  15  /  AlkPhos  144  [09-02-22 @ 16:33]      PTT: >200.0      [09-02-22 @ 13:04]    Uric acid 8.3      [09-02-22 @ 16:33]        [09-02-22 @ 16:33]    Creatinine Trend:  SCr 1.70 [09-02 @ 16:33]  SCr 1.72 [09-02 @ 05:09]  SCr 1.93 [09-01 @ 21:23]  SCr 1.90 [09-01 @ 18:48]  SCr 2.05 [09-01 @ 06:25]              Urinalysis - [09-01-22 @ 12:01]      Color Light Yellow / Appearance Clear / SG 1.011 / pH 5.5      Gluc Negative / Ketone Negative  / Bili Negative / Urobili Negative       Blood Negative / Protein Negative / Leuk Est Negative / Nitrite Negative      RBC  / WBC  / Hyaline  / Gran  / Sq Epi  / Non Sq Epi  / Bacteria       HbA1c 6.0      [09-26-19 @ 08:44]  Lipid: chol 140, TG 93, HDL 40, LDL --      [09-02-22 @ 05:09]

## 2022-09-02 NOTE — PROGRESS NOTE ADULT - ATTENDING COMMENTS
71yo M w/ extensive PMHx including DLBCL, G6PD deficiency, complete heart block s/p PPM, HFrEF (new EF 20%), CKD 3b, paroxysmal atrial fibrillation, presents with anemia, pt was treated for TLS syndrome w/ rasburicase on 8/23 and current episode concerning for an acute hemolytic anemia in setting of G6PD deficiency. Patient also p/w acute hypoxic respiratory failure due to heart failure exacerbation requiring bipap initially. He was eventually titrated off bipap and nasal cannula, now breathing comfortably on room air after initiation of diuretics.    #Hemolytic anemia  -s/p transfusion, counts stable, continue to trend CBC  -c/w A/C (eliquis switched to hep gtt in anticipation of bx)  -heme following - likely will need another dose of rasburicase, will trend TLS labs and CBC closely    #Follicular lymphoma  #TLS  -consulted renal for TLS  -started on prednisone and lymphoma treatment per heme   -cont allopurinol   -biopsy will be done w/local anesthesia     #acute on chronic systolic heart failure  -heart failure recs appreciated, consulting cardio-oncology now too  -per neuro OK to aim for normotension - on hydralazine 25 TID. Tomorrow start entresto.   -switched coreg to metoprolol due to soft BPs, will add entresto per HF recs  -cont bumex 2mg BID  -echo shows EF of 20%    Metabolic encephalopathy  - patient has been oriented to self and sometimes place for the past few days, but no acute changes in mentation  - CT head incidentally found to have acute/subacute R parietal stroke - neurology recs appreciated. Obtain MRI/MRA H/N  - elevated wbc count is likely due to steroid initiation - but will check infectious work-up. Cultures sent.

## 2022-09-02 NOTE — PROVIDER CONTACT NOTE (CRITICAL VALUE NOTIFICATION) - ACTION/TREATMENT ORDERED:
As per Elian Wheeler (Team 3) will follow nomogram and pause for 60minutes, then restart and decrease rate from 22mL/hr to 19mL/hr. Will continue to monitor.
Per MD, no new EKG warranted at this time, safe to transport pt with prior EKGs to floor. Pt denies having any chest pain. RN notified received RN on [employer] of critical value.

## 2022-09-03 DIAGNOSIS — E87.0 HYPEROSMOLALITY AND HYPERNATREMIA: ICD-10-CM

## 2022-09-03 LAB
ALBUMIN SERPL ELPH-MCNC: 3.6 G/DL — SIGNIFICANT CHANGE UP (ref 3.3–5)
ALBUMIN SERPL ELPH-MCNC: 3.7 G/DL — SIGNIFICANT CHANGE UP (ref 3.3–5)
ALP SERPL-CCNC: 146 U/L — HIGH (ref 40–120)
ALP SERPL-CCNC: 149 U/L — HIGH (ref 40–120)
ALT FLD-CCNC: 16 U/L — SIGNIFICANT CHANGE UP (ref 10–45)
ALT FLD-CCNC: 16 U/L — SIGNIFICANT CHANGE UP (ref 10–45)
ANION GAP SERPL CALC-SCNC: 14 MMOL/L — SIGNIFICANT CHANGE UP (ref 5–17)
ANION GAP SERPL CALC-SCNC: 16 MMOL/L — SIGNIFICANT CHANGE UP (ref 5–17)
APTT BLD: 56.9 SEC — HIGH (ref 27.5–35.5)
APTT BLD: 68.7 SEC — HIGH (ref 27.5–35.5)
APTT BLD: 85.7 SEC — HIGH (ref 27.5–35.5)
AST SERPL-CCNC: 17 U/L — SIGNIFICANT CHANGE UP (ref 10–40)
AST SERPL-CCNC: 19 U/L — SIGNIFICANT CHANGE UP (ref 10–40)
BILIRUB SERPL-MCNC: 1.1 MG/DL — SIGNIFICANT CHANGE UP (ref 0.2–1.2)
BILIRUB SERPL-MCNC: 1.2 MG/DL — SIGNIFICANT CHANGE UP (ref 0.2–1.2)
BUN SERPL-MCNC: 75 MG/DL — HIGH (ref 7–23)
BUN SERPL-MCNC: 81 MG/DL — HIGH (ref 7–23)
CALCIUM SERPL-MCNC: 9 MG/DL — SIGNIFICANT CHANGE UP (ref 8.4–10.5)
CALCIUM SERPL-MCNC: 9.3 MG/DL — SIGNIFICANT CHANGE UP (ref 8.4–10.5)
CHLORIDE SERPL-SCNC: 106 MMOL/L — SIGNIFICANT CHANGE UP (ref 96–108)
CHLORIDE SERPL-SCNC: 107 MMOL/L — SIGNIFICANT CHANGE UP (ref 96–108)
CO2 SERPL-SCNC: 28 MMOL/L — SIGNIFICANT CHANGE UP (ref 22–31)
CO2 SERPL-SCNC: 29 MMOL/L — SIGNIFICANT CHANGE UP (ref 22–31)
CREAT SERPL-MCNC: 1.36 MG/DL — HIGH (ref 0.5–1.3)
CREAT SERPL-MCNC: 1.48 MG/DL — HIGH (ref 0.5–1.3)
EGFR: 50 ML/MIN/1.73M2 — LOW
EGFR: 56 ML/MIN/1.73M2 — LOW
GLUCOSE BLDC GLUCOMTR-MCNC: 137 MG/DL — HIGH (ref 70–99)
GLUCOSE BLDC GLUCOMTR-MCNC: 186 MG/DL — HIGH (ref 70–99)
GLUCOSE BLDC GLUCOMTR-MCNC: 197 MG/DL — HIGH (ref 70–99)
GLUCOSE BLDC GLUCOMTR-MCNC: 233 MG/DL — HIGH (ref 70–99)
GLUCOSE SERPL-MCNC: 169 MG/DL — HIGH (ref 70–99)
GLUCOSE SERPL-MCNC: 204 MG/DL — HIGH (ref 70–99)
HAPTOGLOB SERPL-MCNC: 140 MG/DL — SIGNIFICANT CHANGE UP (ref 34–200)
HAPTOGLOB SERPL-MCNC: 141 MG/DL — SIGNIFICANT CHANGE UP (ref 34–200)
HAPTOGLOB SERPL-MCNC: 150 MG/DL — SIGNIFICANT CHANGE UP (ref 34–200)
HCT VFR BLD CALC: 32.3 % — LOW (ref 39–50)
HCT VFR BLD CALC: 32.6 % — LOW (ref 39–50)
HGB BLD-MCNC: 9.7 G/DL — LOW (ref 13–17)
HGB BLD-MCNC: 9.7 G/DL — LOW (ref 13–17)
LDH SERPL L TO P-CCNC: 303 U/L — HIGH (ref 50–242)
LDH SERPL L TO P-CCNC: 309 U/L — HIGH (ref 50–242)
MAGNESIUM SERPL-MCNC: 2 MG/DL — SIGNIFICANT CHANGE UP (ref 1.6–2.6)
MAGNESIUM SERPL-MCNC: 2.1 MG/DL — SIGNIFICANT CHANGE UP (ref 1.6–2.6)
MCHC RBC-ENTMCNC: 26.4 PG — LOW (ref 27–34)
MCHC RBC-ENTMCNC: 26.6 PG — LOW (ref 27–34)
MCHC RBC-ENTMCNC: 29.8 GM/DL — LOW (ref 32–36)
MCHC RBC-ENTMCNC: 30 GM/DL — LOW (ref 32–36)
MCV RBC AUTO: 88.5 FL — SIGNIFICANT CHANGE UP (ref 80–100)
MCV RBC AUTO: 88.6 FL — SIGNIFICANT CHANGE UP (ref 80–100)
NRBC # BLD: 0 /100 WBCS — SIGNIFICANT CHANGE UP (ref 0–0)
NRBC # BLD: 0 /100 WBCS — SIGNIFICANT CHANGE UP (ref 0–0)
OSMOLALITY SERPL: 333 MOSMOL/KG — HIGH (ref 280–301)
OSMOLALITY UR: 399 MOS/KG — SIGNIFICANT CHANGE UP (ref 300–900)
PHOSPHATE SERPL-MCNC: 2.9 MG/DL — SIGNIFICANT CHANGE UP (ref 2.5–4.5)
PHOSPHATE SERPL-MCNC: 4.4 MG/DL — SIGNIFICANT CHANGE UP (ref 2.5–4.5)
PLATELET # BLD AUTO: 227 K/UL — SIGNIFICANT CHANGE UP (ref 150–400)
PLATELET # BLD AUTO: 258 K/UL — SIGNIFICANT CHANGE UP (ref 150–400)
POTASSIUM SERPL-MCNC: 3.2 MMOL/L — LOW (ref 3.5–5.3)
POTASSIUM SERPL-MCNC: 3.5 MMOL/L — SIGNIFICANT CHANGE UP (ref 3.5–5.3)
POTASSIUM SERPL-SCNC: 3.2 MMOL/L — LOW (ref 3.5–5.3)
POTASSIUM SERPL-SCNC: 3.5 MMOL/L — SIGNIFICANT CHANGE UP (ref 3.5–5.3)
PROT SERPL-MCNC: 7.6 G/DL — SIGNIFICANT CHANGE UP (ref 6–8.3)
PROT SERPL-MCNC: 7.6 G/DL — SIGNIFICANT CHANGE UP (ref 6–8.3)
RBC # BLD: 3.65 M/UL — LOW (ref 4.2–5.8)
RBC # BLD: 3.68 M/UL — LOW (ref 4.2–5.8)
RBC # FLD: 19.3 % — HIGH (ref 10.3–14.5)
RBC # FLD: 19.6 % — HIGH (ref 10.3–14.5)
SODIUM SERPL-SCNC: 149 MMOL/L — HIGH (ref 135–145)
SODIUM SERPL-SCNC: 151 MMOL/L — HIGH (ref 135–145)
SODIUM UR-SCNC: 94 MMOL/L — SIGNIFICANT CHANGE UP
URATE SERPL-MCNC: 8.9 MG/DL — HIGH (ref 3.4–8.8)
URATE SERPL-MCNC: 9.1 MG/DL — HIGH (ref 3.4–8.8)
WBC # BLD: 10.95 K/UL — HIGH (ref 3.8–10.5)
WBC # BLD: 12.42 K/UL — HIGH (ref 3.8–10.5)
WBC # FLD AUTO: 10.95 K/UL — HIGH (ref 3.8–10.5)
WBC # FLD AUTO: 12.42 K/UL — HIGH (ref 3.8–10.5)

## 2022-09-03 PROCEDURE — 99233 SBSQ HOSP IP/OBS HIGH 50: CPT | Mod: GC

## 2022-09-03 RX ORDER — HEPARIN SODIUM 5000 [USP'U]/ML
6500 INJECTION INTRAVENOUS; SUBCUTANEOUS ONCE
Refills: 0 | Status: DISCONTINUED | OUTPATIENT
Start: 2022-09-03 | End: 2022-09-03

## 2022-09-03 RX ORDER — SACUBITRIL AND VALSARTAN 24; 26 MG/1; MG/1
1 TABLET, FILM COATED ORAL
Refills: 0 | Status: DISCONTINUED | OUTPATIENT
Start: 2022-09-03 | End: 2022-09-05

## 2022-09-03 RX ORDER — POTASSIUM CHLORIDE 20 MEQ
40 PACKET (EA) ORAL EVERY 4 HOURS
Refills: 0 | Status: COMPLETED | OUTPATIENT
Start: 2022-09-03 | End: 2022-09-04

## 2022-09-03 RX ORDER — HEPARIN SODIUM 5000 [USP'U]/ML
3000 INJECTION INTRAVENOUS; SUBCUTANEOUS EVERY 6 HOURS
Refills: 0 | Status: DISCONTINUED | OUTPATIENT
Start: 2022-09-03 | End: 2022-09-07

## 2022-09-03 RX ORDER — ATORVASTATIN CALCIUM 80 MG/1
40 TABLET, FILM COATED ORAL AT BEDTIME
Refills: 0 | Status: DISCONTINUED | OUTPATIENT
Start: 2022-09-03 | End: 2022-09-07

## 2022-09-03 RX ORDER — HEPARIN SODIUM 5000 [USP'U]/ML
INJECTION INTRAVENOUS; SUBCUTANEOUS
Qty: 25000 | Refills: 0 | Status: DISCONTINUED | OUTPATIENT
Start: 2022-09-03 | End: 2022-09-07

## 2022-09-03 RX ORDER — HEPARIN SODIUM 5000 [USP'U]/ML
6500 INJECTION INTRAVENOUS; SUBCUTANEOUS EVERY 6 HOURS
Refills: 0 | Status: DISCONTINUED | OUTPATIENT
Start: 2022-09-03 | End: 2022-09-07

## 2022-09-03 RX ADMIN — Medication 40 MILLIEQUIVALENT(S): at 21:31

## 2022-09-03 RX ADMIN — CHLORHEXIDINE GLUCONATE 1 APPLICATION(S): 213 SOLUTION TOPICAL at 15:21

## 2022-09-03 RX ADMIN — Medication 25 MILLIGRAM(S): at 21:32

## 2022-09-03 RX ADMIN — Medication 1: at 08:22

## 2022-09-03 RX ADMIN — Medication 1 TABLET(S): at 12:04

## 2022-09-03 RX ADMIN — SEVELAMER CARBONATE 800 MILLIGRAM(S): 2400 POWDER, FOR SUSPENSION ORAL at 17:08

## 2022-09-03 RX ADMIN — CHLORHEXIDINE GLUCONATE 1 APPLICATION(S): 213 SOLUTION TOPICAL at 06:13

## 2022-09-03 RX ADMIN — BUMETANIDE 2 MILLIGRAM(S): 0.25 INJECTION INTRAMUSCULAR; INTRAVENOUS at 06:27

## 2022-09-03 RX ADMIN — HEPARIN SODIUM 1400 UNIT(S)/HR: 5000 INJECTION INTRAVENOUS; SUBCUTANEOUS at 08:35

## 2022-09-03 RX ADMIN — SEVELAMER CARBONATE 800 MILLIGRAM(S): 2400 POWDER, FOR SUSPENSION ORAL at 12:04

## 2022-09-03 RX ADMIN — Medication 1: at 17:07

## 2022-09-03 RX ADMIN — Medication 400 MILLIGRAM(S): at 17:08

## 2022-09-03 RX ADMIN — SACUBITRIL AND VALSARTAN 1 TABLET(S): 24; 26 TABLET, FILM COATED ORAL at 18:39

## 2022-09-03 RX ADMIN — HEPARIN SODIUM 1400 UNIT(S)/HR: 5000 INJECTION INTRAVENOUS; SUBCUTANEOUS at 15:39

## 2022-09-03 RX ADMIN — Medication 2: at 12:04

## 2022-09-03 RX ADMIN — HEPARIN SODIUM 1400 UNIT(S)/HR: 5000 INJECTION INTRAVENOUS; SUBCUTANEOUS at 08:29

## 2022-09-03 RX ADMIN — Medication 100 MILLIGRAM(S): at 15:40

## 2022-09-03 RX ADMIN — Medication 400 MILLIGRAM(S): at 06:27

## 2022-09-03 RX ADMIN — BUMETANIDE 2 MILLIGRAM(S): 0.25 INJECTION INTRAMUSCULAR; INTRAVENOUS at 16:25

## 2022-09-03 RX ADMIN — HEPARIN SODIUM 0 UNIT(S)/HR: 5000 INJECTION INTRAVENOUS; SUBCUTANEOUS at 00:00

## 2022-09-03 RX ADMIN — Medication 25 MILLIGRAM(S): at 06:27

## 2022-09-03 RX ADMIN — Medication 3 MILLIGRAM(S): at 21:32

## 2022-09-03 RX ADMIN — Medication 58 MILLIGRAM(S): at 14:34

## 2022-09-03 RX ADMIN — Medication 25 MILLIGRAM(S): at 15:22

## 2022-09-03 RX ADMIN — ATORVASTATIN CALCIUM 40 MILLIGRAM(S): 80 TABLET, FILM COATED ORAL at 21:31

## 2022-09-03 RX ADMIN — HEPARIN SODIUM 1400 UNIT(S)/HR: 5000 INJECTION INTRAVENOUS; SUBCUTANEOUS at 19:39

## 2022-09-03 RX ADMIN — SEVELAMER CARBONATE 800 MILLIGRAM(S): 2400 POWDER, FOR SUSPENSION ORAL at 08:24

## 2022-09-03 NOTE — ADVANCED PRACTICE NURSE CONSULT - ASSESSMENT
received pt in bed awake alert x2  able to express his needs  Cycle #1 day 3/3 .Height and weight verified. Lab results as per carlos, Dr Haley aware of same to repeat labs q12 . Vital signs stable prior to the start of chemotherapy, see sunrise.  Pt educated on the importance of saving urine, verbalize understanding of same .Pt education done re chemo regimen, drug effects and potential side effects, written material provided, pt states understanding. Pt reports the he has tolerated previous chemotherapy without side effects Pt with DL PICC line,on rt upper arm site free from signs and symptoms of infection, positive blood return obtained. no premeds pt started on etoposide 30mg/m2=58mg iv intiated at 1435 infuse over 1hr    received pt in bed awake alert x2  able to express his needs  Cycle #1 day 3/3 .Height and weight verified. Lab results as per Dr Tera gonzalez aware of same to repeat labs q12 . Vital signs stable prior to the start of chemotherapy, see sunrise.  Pt educated on the importance of saving urine, verbalize understanding of same .Pt education done re chemo regimen, drug effects and potential side effects, written material provided, pt states understanding. Pt reports the he has tolerated previous chemotherapy without side effects Pt with DL PICC line,on rt upper arm site free from signs and symptoms of infection, positive blood return obtained. no premeds pt started on etoposide 30mg/m2=58mg iv intiated at 1435 infuse over 1hr completed the infusion with no adverse effects report given to the area nurse

## 2022-09-03 NOTE — PROGRESS NOTE ADULT - SUBJECTIVE AND OBJECTIVE BOX
[facility]   [phone]  RENAL FOLLOW UP NOTE  --------------------------------------------------------------------------------  Chief Complaint:    24 hour events/subjective:    seen earlier.   stated feels better     PAST HISTORY  --------------------------------------------------------------------------------  No significant changes to PMH, PSH, FHx, SHx, unless otherwise noted    ALLERGIES & MEDICATIONS  --------------------------------------------------------------------------------  Allergies    No Known Allergies    Intolerances      Standing Inpatient Medications  acyclovir   Oral Tab/Cap 400 milliGRAM(s) Oral two times a day  allopurinol 100 milliGRAM(s) Oral daily  atorvastatin 40 milliGRAM(s) Oral at bedtime  buMETAnide Injectable 2 milliGRAM(s) IV Push two times a day  chlorhexidine 2% Cloths 1 Application(s) Topical daily  chlorhexidine 4% Liquid 1 Application(s) Topical <User Schedule>  dextrose 5%. 1000 milliLiter(s) IV Continuous <Continuous>  dextrose 5%. 1000 milliLiter(s) IV Continuous <Continuous>  dextrose 50% Injectable 25 Gram(s) IV Push once  dextrose 50% Injectable 12.5 Gram(s) IV Push once  dextrose 50% Injectable 25 Gram(s) IV Push once  glucagon  Injectable 1 milliGRAM(s) IntraMuscular once  heparin  Infusion.  Unit(s)/Hr IV Continuous <Continuous>  hydrALAZINE 25 milliGRAM(s) Oral every 8 hours  insulin lispro (ADMELOG) corrective regimen sliding scale   SubCutaneous Before meals and at bedtime  metoprolol succinate ER 25 milliGRAM(s) Oral daily  multivitamin 1 Tablet(s) Oral daily  potassium chloride    Tablet ER 40 milliEquivalent(s) Oral every 4 hours  sacubitril 24 mG/valsartan 26 mG 1 Tablet(s) Oral two times a day  sevelamer carbonate 800 milliGRAM(s) Oral three times a day with meals    PRN Inpatient Medications  dextrose Oral Gel 15 Gram(s) Oral once PRN  heparin   Injectable 6500 Unit(s) IV Push every 6 hours PRN  heparin   Injectable 3000 Unit(s) IV Push every 6 hours PRN  melatonin 3 milliGRAM(s) Oral at bedtime PRN  sodium chloride 0.9% lock flush 10 milliLiter(s) IV Push every 1 hour PRN      REVIEW OF SYSTEMS  --------------------------------------------------------------------------------    Gen: denies  fevers/chills,  CVS: denies chest pain/palpitations  Resp: denies SOB/Cough  GI: Denies N/V/Abd pain  : Denies dysuria/oliguria/hematuria      VITALS/PHYSICAL EXAM  --------------------------------------------------------------------------------  T(C): 36.3 (09-03-22 @ 20:38), Max: 36.6 (09-03-22 @ 12:21)  HR: 92 (09-03-22 @ 20:38) (80 - 100)  BP: 144/72 (09-03-22 @ 20:38) (126/66 - 151/79)  RR: 18 (09-03-22 @ 20:38) (18 - 18)  SpO2: 97% (09-03-22 @ 20:38) (95% - 100%)  Wt(kg): --        09-02-22 @ 07:01  -  09-03-22 @ 07:00  --------------------------------------------------------  IN: 1487.9 mL / OUT: 2860 mL / NET: -1372.1 mL    09-03-22 @ 07:01  -  09-03-22 @ 20:57  --------------------------------------------------------  IN: 1082.9 mL / OUT: 1550 mL / NET: -467.1 mL      Physical Exam:  	                Gen: thin pale appearing   	Pulm: decrease bs  no rales or ronchi or wheezing  	CV: no JVD. RRR, S1S2; no rub  	Abd: +BS, soft, nontender/nondistended  	: No suprapubic tenderness  	UE: Warm, no cyanosis  no clubbing,  no edema  	LE: Warm, no cyanosis  no clubbing, no edema  	Neuro: alert and oriented. speech coherent       LABS/STUDIES  --------------------------------------------------------------------------------              9.7    10.95 >-----------<  227      [09-03-22 @ 16:42]              32.6     149  |  106  |  75  ----------------------------<  169      [09-03-22 @ 16:44]  3.2   |  29  |  1.36        Ca     9.3     [09-03-22 @ 16:44]      Mg     2.0     [09-03-22 @ 16:44]      Phos  2.9     [09-03-22 @ 16:44]    TPro  7.6  /  Alb  3.7  /  TBili  1.1  /  DBili  x   /  AST  19  /  ALT  16  /  AlkPhos  149  [09-03-22 @ 16:44]      PTT: 68.7       [09-03-22 @ 14:47]    Uric acid 9.1      [09-03-22 @ 16:44]        [09-03-22 @ 16:44]  Serum Osmolality 333      [09-03-22 @ 16:42]    Creatinine Trend:  SCr 1.36 [09-03 @ 16:44]  SCr 1.48 [09-03 @ 06:17]  SCr 1.70 [09-02 @ 16:33]  SCr 1.72 [09-02 @ 05:09]  SCr 1.93 [09-01 @ 21:23]              Urinalysis - [09-01-22 @ 12:01]      Color Light Yellow / Appearance Clear / SG 1.011 / pH 5.5      Gluc Negative / Ketone Negative  / Bili Negative / Urobili Negative       Blood Negative / Protein Negative / Leuk Est Negative / Nitrite Negative      RBC  / WBC  / Hyaline  / Gran  / Sq Epi  / Non Sq Epi  / Bacteria     Urine Sodium 94      [09-03-22 @ 18:02]  Urine Osmolality 399      [09-03-22 @ 18:02]    HbA1c 6.0      [09-26-19 @ 08:44]  Lipid: chol 140, TG 93, HDL 40, LDL --      [09-02-22 @ 05:09]

## 2022-09-03 NOTE — PROGRESS NOTE ADULT - PROBLEM SELECTOR PLAN 1
Na at 151, uptrend from prior 140s, could be related to volume depletion iso diuresis vs DI iso recent neuro event  Differential is broad but will obtain better evaluation of volume status  - pending serum osm  - pending urine osm, Tirso Na at 151, uptrend from prior 140s, could be related to volume depletion iso diuresis vs DI iso recent neuro event  Differential is broad but will obtain better evaluation of volume status  - pending serum osm, will have on repeat labs as below  - pending urine osm, Tirso

## 2022-09-03 NOTE — PROGRESS NOTE ADULT - SUBJECTIVE AND OBJECTIVE BOX
Progress Note    22 (7d)    Patient is a 72y old  Male who presents with a chief complaint of follicular  B cell lymphoma relapse (02 Sep 2022 09:32)      Subjective / Overnight Events :  - No acute events overnight.  - Pt seen and examined at bedside.     Additional ROS (if any):    MEDICATIONS  (STANDING):  acyclovir   Oral Tab/Cap 400 milliGRAM(s) Oral two times a day  allopurinol 100 milliGRAM(s) Oral daily  atorvastatin 80 milliGRAM(s) Oral at bedtime  buMETAnide Injectable 2 milliGRAM(s) IV Push two times a day  chlorhexidine 2% Cloths 1 Application(s) Topical daily  chlorhexidine 4% Liquid 1 Application(s) Topical <User Schedule>  dextrose 5%. 1000 milliLiter(s) (100 mL/Hr) IV Continuous <Continuous>  dextrose 5%. 1000 milliLiter(s) (50 mL/Hr) IV Continuous <Continuous>  dextrose 50% Injectable 25 Gram(s) IV Push once  dextrose 50% Injectable 12.5 Gram(s) IV Push once  dextrose 50% Injectable 25 Gram(s) IV Push once  etoposide (TOPOSAR) IVPB (eMAR) 58 milliGRAM(s) IV Intermittent every 24 hours  glucagon  Injectable 1 milliGRAM(s) IntraMuscular once  heparin  Infusion.  Unit(s)/Hr (14 mL/Hr) IV Continuous <Continuous>  hydrALAZINE 25 milliGRAM(s) Oral every 8 hours  insulin lispro (ADMELOG) corrective regimen sliding scale   SubCutaneous Before meals and at bedtime  metoprolol succinate ER 25 milliGRAM(s) Oral daily  multivitamin 1 Tablet(s) Oral daily  sevelamer carbonate 800 milliGRAM(s) Oral three times a day with meals    MEDICATIONS  (PRN):  dextrose Oral Gel 15 Gram(s) Oral once PRN Blood Glucose LESS THAN 70 milliGRAM(s)/deciliter  heparin   Injectable 6500 Unit(s) IV Push every 6 hours PRN For aPTT less than 40  heparin   Injectable 3000 Unit(s) IV Push every 6 hours PRN For aPTT between 40 - 57  melatonin 3 milliGRAM(s) Oral at bedtime PRN Insomnia  sodium chloride 0.9% lock flush 10 milliLiter(s) IV Push every 1 hour PRN Pre/post blood products, medications, blood draw, and to maintain line patency          PHYSICAL EXAM:  Vital Signs Last 24 Hrs  T(C): 36.4 (03 Sep 2022 01:15), Max: 36.7 (02 Sep 2022 20:06)  T(F): 97.6 (03 Sep 2022 01:15), Max: 98.1 (02 Sep 2022 20:06)  HR: 100 (03 Sep 2022 04:29) (68 - 101)  BP: 148/80 (03 Sep 2022 04:29) (112/56 - 151/79)  BP(mean): --  RR: 18 (03 Sep 2022 04:29) (18 - 19)  SpO2: 95% (03 Sep 2022 04:29) (95% - 100%)    Parameters below as of 03 Sep 2022 04:29  Patient On (Oxygen Delivery Method): room air        I&O's Summary    02 Sep 2022 07:01  -  03 Sep 2022 07:00  --------------------------------------------------------  IN: 1487.9 mL / OUT: 2860 mL / NET: -1372.1 mL        General: NAD, non-toxic appearing   HEENT: PERRLA, EOMi, no scleral icterus  CV: RRR, normal S1 and S2, no m/r/g  Lungs: normal respiratory effort. CTAB, no wheezes, rales, or rhonchi  Abd: soft, nontender, nondistended  Ext: no edema, 2+ peripheral pulses   Pysch: AAOx3, appropriate affect   Neuro: grossly non-focal, moving all extremities spontaneously   Skin: no rashes or lesions     LABS:  CAPILLARY BLOOD GLUCOSE      POCT Blood Glucose.: 250 mg/dL (02 Sep 2022 21:16)  POCT Blood Glucose.: 227 mg/dL (02 Sep 2022 17:47)  POCT Blood Glucose.: 242 mg/dL (02 Sep 2022 16:24)  POCT Blood Glucose.: 213 mg/dL (02 Sep 2022 11:50)  POCT Blood Glucose.: 214 mg/dL (02 Sep 2022 07:46)                              9.7    12.42 )-----------( 258      ( 03 Sep 2022 06:17 )             32.3       WBC Trend: 12.42<--, 13.70<--, 13.41<--  Hb Trend: 9.7<--, 10.1<--, 9.2<--, 9.0<--, 8.7<--    0903    151<H>  |  107  |  81<H>  ----------------------------<  204<H>  3.5   |  28  |  1.48<H>    Ca    9.0      03 Sep 2022 06:17  Phos  4.4       Mg     2.1         TPro  7.6  /  Alb  3.6  /  TBili  1.2  /  DBili  x   /  AST  17  /  ALT  16  /  AlkPhos  146<H>  03    PTT - ( 03 Sep 2022 06:17 )  PTT:85.7 sec      Urinalysis Basic - ( 01 Sep 2022 12:01 )    Color: Light Yellow / Appearance: Clear / S.011 / pH: x  Gluc: x / Ketone: Negative  / Bili: Negative / Urobili: Negative   Blood: x / Protein: Negative / Nitrite: Negative   Leuk Esterase: Negative / RBC: x / WBC x   Sq Epi: x / Non Sq Epi: x / Bacteria: x        Culture - Urine (collected 01 Sep 2022 12:01)  Source: Clean Catch Clean Catch (Midstream)  Final Report (02 Sep 2022 12:39):    <10,000 CFU/mL Normal Urogenital Slime    Culture - Blood (collected 01 Sep 2022 12:00)  Source: .Blood Blood-Peripheral  Preliminary Report (02 Sep 2022 17:02):    No growth to date.          RADIOLOGY & ADDITIONAL TESTS: Reviewed

## 2022-09-03 NOTE — PROGRESS NOTE ADULT - SUBJECTIVE AND OBJECTIVE BOX
Neurology Progress Note    S: Patient seen and examined. No new events overnight. patient denied CP, SOB, HA or pain.     Medication:  acyclovir   Oral Tab/Cap 400 milliGRAM(s) Oral two times a day  allopurinol 100 milliGRAM(s) Oral daily  atorvastatin 80 milliGRAM(s) Oral at bedtime  buMETAnide Injectable 2 milliGRAM(s) IV Push two times a day  chlorhexidine 2% Cloths 1 Application(s) Topical daily  chlorhexidine 4% Liquid 1 Application(s) Topical <User Schedule>  dextrose 5%. 1000 milliLiter(s) IV Continuous <Continuous>  dextrose 5%. 1000 milliLiter(s) IV Continuous <Continuous>  dextrose 50% Injectable 25 Gram(s) IV Push once  dextrose 50% Injectable 12.5 Gram(s) IV Push once  dextrose 50% Injectable 25 Gram(s) IV Push once  dextrose Oral Gel 15 Gram(s) Oral once PRN  etoposide (TOPOSAR) IVPB (eMAR) 58 milliGRAM(s) IV Intermittent every 24 hours  glucagon  Injectable 1 milliGRAM(s) IntraMuscular once  heparin   Injectable 6500 Unit(s) IV Push once  heparin   Injectable 6500 Unit(s) IV Push every 6 hours PRN  heparin   Injectable 3000 Unit(s) IV Push every 6 hours PRN  heparin  Infusion.  Unit(s)/Hr IV Continuous <Continuous>  hydrALAZINE 25 milliGRAM(s) Oral every 8 hours  insulin lispro (ADMELOG) corrective regimen sliding scale   SubCutaneous Before meals and at bedtime  melatonin 3 milliGRAM(s) Oral at bedtime PRN  metoprolol succinate ER 25 milliGRAM(s) Oral daily  multivitamin 1 Tablet(s) Oral daily  sevelamer carbonate 800 milliGRAM(s) Oral three times a day with meals  sodium chloride 0.9% lock flush 10 milliLiter(s) IV Push every 1 hour PRN      Vitals:  Vital Signs Last 24 Hrs  T(C): 36.4 (03 Sep 2022 01:15), Max: 36.7 (02 Sep 2022 20:06)  T(F): 97.6 (03 Sep 2022 01:15), Max: 98.1 (02 Sep 2022 20:06)  HR: 100 (03 Sep 2022 04:29) (68 - 101)  BP: 148/80 (03 Sep 2022 04:29) (112/56 - 151/79)  BP(mean): --  RR: 18 (03 Sep 2022 04:29) (18 - 19)  SpO2: 95% (03 Sep 2022 04:29) (95% - 100%)    Parameters below as of 03 Sep 2022 04:29  Patient On (Oxygen Delivery Method): room air        General Exam:   General Appearance: Appropriately dressed and in no acute distress       Head: Normocephalic, atraumatic and no dysmorphic features  Ear, Nose, and Throat: Moist mucous membranes  CVS: S1S2+  Resp: No SOB, no wheeze or rhonchi  Abd: soft NTND  Extremities: No edema, no cyanosis  Skin: No bruises, no rashes     Neurological Exam:  - Mental Status:  Alert, awake, oriented to person, not place, and time; Speech is fluent with intact naming.   - Cranial Nerves II-XII:    II:  Visual fields are full to confrontation; Pupils are equal, round, and reactive to light  III, IV, VI:  Extraocular movements are intact without nystagmus.  V:  Facial sensation is intact in the V1-V3 distribution bilaterally.  VII:  Face is symmetric with normal eye closure and smile  VIII:  Hearing is intact to finger rub.  IX, X:  Uvula is midline and soft palate rises symmetrically  XI:  Head turning and shoulder shrug are intact.  XII:  Tongue protudes in the midline.  - Motor:  Strength is 5/5 throughout.  There is no pronator drift.  Normal muscle bulk and tone throughout.  - Sensory:  Intact throughout to light touch.  - Coordination:  Finger-nose-finger and heel-knee-shin intact without dysmetria, but b/l intention tremor.   - Gait:   Due to fall risk, did not assess.       I personally reviewed the below data/images/labs:      CBC Full  -  ( 03 Sep 2022 06:17 )  WBC Count : 12.42 K/uL  RBC Count : 3.65 M/uL  Hemoglobin : 9.7 g/dL  Hematocrit : 32.3 %  Platelet Count - Automated : 258 K/uL  Mean Cell Volume : 88.5 fl  Mean Cell Hemoglobin : 26.6 pg  Mean Cell Hemoglobin Concentration : 30.0 gm/dL  Auto Neutrophil # : x  Auto Lymphocyte # : x  Auto Monocyte # : x  Auto Eosinophil # : x  Auto Basophil # : x  Auto Neutrophil % : x  Auto Lymphocyte % : x  Auto Monocyte % : x  Auto Eosinophil % : x  Auto Basophil % : x        151<H>  |  107  |  81<H>  ----------------------------<  204<H>  3.5   |  28  |  1.48<H>    Ca    9.0      03 Sep 2022 06:17  Phos  4.4       Mg     2.1         TPro  7.6  /  Alb  3.6  /  TBili  1.2  /  DBili  x   /  AST  17  /  ALT  16  /  AlkPhos  146<H>      LIVER FUNCTIONS - ( 03 Sep 2022 06:17 )  Alb: 3.6 g/dL / Pro: 7.6 g/dL / ALK PHOS: 146 U/L / ALT: 16 U/L / AST: 17 U/L / GGT: x           PTT - ( 03 Sep 2022 06:17 )  PTT:85.7 sec  Urinalysis Basic - ( 01 Sep 2022 12:01 )    Color: Light Yellow / Appearance: Clear / S.011 / pH: x  Gluc: x / Ketone: Negative  / Bili: Negative / Urobili: Negative   Blood: x / Protein: Negative / Nitrite: Negative   Leuk Esterase: Negative / RBC: x / WBC x   Sq Epi: x / Non Sq Epi: x / Bacteria: x    Acute/subacute right-sided parietal lobe infarction   superimposed upon a chronic infarct. Findings appear larger in size when   compared with 2019.    No hemorrhagic transformation at this time.    Similar-appearing mild chronic white matter microvascular type changes   and chronic lacunar infarct within the right basal ganglia.

## 2022-09-03 NOTE — PROGRESS NOTE ADULT - PROBLEM SELECTOR PLAN 6
History of HFrEF EF 45%, diffuse LV hypokinesis, mod pulmHTN; followed by cardiology outpatient, elevated proBNP with uptrending troponins, now downtrending. TTE (8/27) ef=20%, severe MR, mild LV enlargement, normal RA, RV enlargement with decreased RVSF, mild-mod tricuspid regurg, moderate pulm pressures, b/l pleural effusions   - c/w metoprolol tartrate 12.5mg BID (for discharge consider metoprolol succinate vs. resuming home Carvedilol at lower dose as per Cards)  - c/w hYDRALAZINE 10MG q8h for afterload reduction  - c/w bumex 2mg BID  - consider adding entresto (in place of home lisinopril) and SGLT2-inhibitor if renal function tolerates History of HFrEF EF 45%, diffuse LV hypokinesis, mod pulmHTN; followed by cardiology outpatient, elevated proBNP with uptrending troponins, now downtrending. TTE (8/27) ef=20%, severe MR, mild LV enlargement, normal RA, RV enlargement with decreased RVSF, mild-mod tricuspid regurg, moderate pulm pressures, b/l pleural effusions   - c/w metoprolol tartrate 12.5mg BID (for discharge consider metoprolol succinate vs. resuming home Carvedilol at lower dose as per Cards)  - c/w hYDRALAZINE 10MG q8h for afterload reduction  - c/w bumex 2mg BID  - adding Entresto

## 2022-09-03 NOTE — PROGRESS NOTE ADULT - PROBLEM SELECTOR PLAN 5
- h/o grade 3 follicular lymphoma, pt was pending surgical biopsy on 8/23, however in heme/onc outpt labs s/f TLS with uric acid 13.7, pt was started rasburicase on 8/23 and developed rasburicase-triggered G6PD hemolysis with hgb 5.7 on 8/26  - UA 6.4 from 13.7 s/p rasburicase on 8/23, now on allopurinol 100mg   - follow-up with heme:      - Started prednisone 100mg daily x5 (day 4/5) days with intent to start Obinutuzumab-COEP inpatient 9/1      - PICC placed (8/30) however patient removed it, PICC placed again (8/31) and ready for chemotherapy initiation      - trend TLS labs (CMP, Mg, Phos, LDH, Uric Acid) q12hrs as patient is high risk at 6am and 6pm daily      - s/p 1st session 9/1, pending 2nd session 9/2  - Surgery consulted for biopsy for definitive diagnosis      - can consider biopsy under local anesthesia, ongoing discussion involving cards, as per cards no need for medical clearance for local biopsy, pending biopsy possibly tomorrow   - cardiology consult      - as per HF team, patient still volume overloaded and not cleared medically for biopsy under general anesthesia      - increased hydralazine to 25mg TID with verification from neuro, drug can cause potential hypoperfusion of brain      - c/w current management with diuresis, metoprolol, hydralazine      - pending bladder scan to r/o obstruction  - cardiology oncology consult      - TTE with severe global left ventricular systolic dysfunction (EF 30%); severe mitral regurg, elevated pulmonary pressures      - can consider ACE vs Estresto today with improving SCr      - SGLT2 is possible - h/o grade 3 follicular lymphoma, pt was pending surgical biopsy on 8/23, however in heme/onc outpt labs s/f TLS with uric acid 13.7, pt was started rasburicase on 8/23 and developed rasburicase-triggered G6PD hemolysis with hgb 5.7 on 8/26  - UA 6.4 from 13.7 s/p rasburicase on 8/23, now on allopurinol 100mg   - follow-up with heme:      - Started prednisone 100mg daily x5 (day 4/5) days with intent to start Obinutuzumab-COEP inpatient 9/1      - PICC placed (8/30) however patient removed it, PICC placed again (8/31) and ready for chemotherapy initiation      - trend TLS labs (CMP, Mg, Phos, LDH, Uric Acid) q12hrs as patient is high risk at 6am and 6pm daily      - s/p 1st session 9/1, pending 2nd session 9/2  - Surgery consulted for biopsy for definitive diagnosis      - can consider biopsy under local anesthesia, ongoing discussion involving cards, as per cards no need for medical clearance for local biopsy, pending biopsy possibly tomorrow   - cardiology consult      - as per HF team, patient still volume overloaded and not cleared medically for biopsy under general anesthesia      - increased hydralazine to 25mg TID with verification from neuro, drug can cause potential hypoperfusion of brain      - c/w current management with diuresis, metoprolol, hydralazine      - pending bladder scan to r/o obstruction  - cardiology oncology consult      - TTE with severe global left ventricular systolic dysfunction (EF 30%); severe mitral regurg, elevated pulmonary pressures      - will add Entresto today      - SGLT2 is possible

## 2022-09-03 NOTE — PROGRESS NOTE ADULT - PROBLEM SELECTOR PLAN 2
heme/onc      - s/p 1 session chemotherapy: TLS labs significant for uric acid 8.2, phosphorus 5.6      - s/p 2 sessions chemotherapy: TLS labs significant for uric acid 8.3, phosphorus 5.4 s/p sevelamer       - most recent G6PD level 11.1 (wnl) following multiple transfusions of pRBC      - patient is at high risk for TLS but still requires chemotherapy iso lymphoma, comfortable to give rasburicase if needed with improved G6PD levels --> as per heme/onc if uric acid > 10, contact fellow first prior to 3mg rasburicase      - 9/3 AM with uric acid 8.9, phos 4.4, potassium wnl  - on allopurinol 100mg heme/onc      - s/p 1 session chemotherapy: TLS labs significant for uric acid 8.2, phosphorus 5.6      - s/p 2 sessions chemotherapy: TLS labs significant for uric acid 8.3, phosphorus 5.4 s/p sevelamer       - session 3 chemotherapy (last session) today      - most recent G6PD level 11.1 (wnl) following multiple transfusions of pRBC      - patient is at high risk for TLS but still requires chemotherapy iso lymphoma, comfortable to give rasburicase if needed with improved G6PD levels --> as per heme/onc if uric acid > 10, contact fellow first prior to 3mg rasburicase      - 9/3 AM with uric acid 8.9, phos 4.4, potassium wnl  - on allopurinol 100mg  - will f/u TLS labs after chemotherapy ends

## 2022-09-03 NOTE — PROGRESS NOTE ADULT - PROBLEM SELECTOR PLAN 4
Has been confused since admission with improvement today (9/1). As per family, this occurs every time the patient is sick with similar symptoms. Considerations include underlying dementia, delirium, vs metabolic causes vs neurological vs malignancy  - AAOx1  - cultures with no growth to date, no acute toxicologies found on labs, no history of cirrhosis with low suspicion for ammonia induced, no indication for renal failure for uremic causes  - CT head non-contrast performed, no official read but as per radiology has acute on chronic of R parietal lobe infarct with no indication of hemorrhagic transformation  - neuro consult       - MRI brain wo contrast, MRA H wo contrast, MRA N w contrast -> tentatively scheduled for Tuesday       - asa, atorvastatin 80mg      - TTE with severe global left ventricular systolic dysfunction, no evidence of thrombi

## 2022-09-03 NOTE — PROGRESS NOTE ADULT - ATTENDING COMMENTS
71yo M w/ extensive PMHx including DLBCL, G6PD deficiency, complete heart block s/p PPM, HFrEF (new EF 20%), CKD 3b, paroxysmal atrial fibrillation, presents with anemia, pt was treated for TLS syndrome w/ rasburicase on 8/23 and current episode concerning for an acute hemolytic anemia in setting of G6PD deficiency. Patient also p/w acute hypoxic respiratory failure due to heart failure exacerbation requiring bipap initially. He was eventually titrated off bipap and nasal cannula, now breathing comfortably on room air after initiation of diuretics.    #Hemolytic anemia due to G6PD deficiency and rasburicase  -s/p transfusion, counts stable, continue to trend CBC  -c/w A/C (eliquis switched to hep gtt in anticipation of bx)  -heme following - uric acid >10 will need another dose of rasburicase, will trend TLS labs and CBC closely    #Follicular lymphoma  #TLS  -consulted renal for TLS  -started on prednisone and lymphoma treatment per heme   -cont allopurinol   -biopsy will be done w/local anesthesia Wednesday    #acute on chronic systolic heart failure  -heart failure recs appreciated, consulting cardio-oncology now too  -per neuro OK to aim for normotension - on hydralazine 25 TID. Start entresto.   -switched coreg to metoprolol due to soft BPs, will add entresto per HF recs  -cont bumex 2mg BID  -echo shows EF of 20%    Metabolic encephalopathy  - patient has been oriented to self and sometimes place for the past few days, but no acute changes in mentation  - CT head incidentally found to have acute/subacute R parietal stroke - neurology recs appreciated. Obtain MRI/MRA H/N  - elevated wbc count is likely due to steroid initiation. Cultures ngtd.

## 2022-09-03 NOTE — PROGRESS NOTE ADULT - ASSESSMENT
72 year old male with HTN, CKD stage II, complete heart block (PPM in place), HLD, HFrEF (45% in 2019), and DLBCL (tx with R-CHOP in 2003, with relapse in 2009 treated with BR) now with recently diagnosed grade 3 follicular lymphoma on 8/23 who presents with anemia and SOB, patient was pending a surgical biopsy on 8/25. Admitted for TLS and rasburicase-triggered G6PD hemolytic anemia. Given that patient has developed confusion this admission and is now AOx1, primary team obtained a CTH, which shows acute/subacute R parietal infarction (larger in size when compared with 5/2019), chronic lacunar infarct in R basal ganglia, mild chronic white matter microvascular disease, no hemorrhagic transformation. Denies headache, weakness, numbness, dizziness, visual disturbances. NIHSS 2-did not know month and age. Of note, patient has had multiple prior episodes of confusion and cognitive difficulties.     CTH, which shows acute/subacute R parietal infarction, chronic R BG infarct noted   LDL 81   A1c 5.4     Impression:  R parietal infarction,  likely 2/2 AF     Recommendations:   - would be low risk for bx.  if heparin held, resume when able after procedure   - stroke is now at least subacute appearing.  can likely start titrating BP to normotension.  vessel imaging MRA H/N to help determine if any athero. if any severe stenosis may need to avoid hypotension   - heparin for AF.  no need for asa from stroke standpoint when on AC   - High dose statin therapy - atorvastatin 40mg PO daily. LDL goal <70mg/dL.  - MRI brain w/ and w/o , and MRA H/N   - TTE, no need from stroke standpoint   - telemetry  - PT/OT/SS/SLP, OOBC  - check FS, glucose control <180  - GI/DVT ppx  - Thank you for allowing me to participate in the care of this patient. Call with questions.  Stone Mo MD  Vascular Neurology  Office: [phone].

## 2022-09-03 NOTE — PROGRESS NOTE ADULT - SUBJECTIVE AND OBJECTIVE BOX
Progress Note    22 (7d)    Patient is a 72y old  Male who presents with a chief complaint of follicular  B cell lymphoma relapse (02 Sep 2022 09:32)      Subjective / Overnight Events :  - No acute events overnight.  - Pt seen and examined at bedside. Complains about coordination problems but is overall confused. Unable to decide if he wants to eat his breakfast, appears to not know what is going on and why he is in the hospital. Endorses SOB but notes improvement following deep breathing.     Additional ROS (if any):    MEDICATIONS  (STANDING):  acyclovir   Oral Tab/Cap 400 milliGRAM(s) Oral two times a day  allopurinol 100 milliGRAM(s) Oral daily  atorvastatin 80 milliGRAM(s) Oral at bedtime  buMETAnide Injectable 2 milliGRAM(s) IV Push two times a day  chlorhexidine 2% Cloths 1 Application(s) Topical daily  chlorhexidine 4% Liquid 1 Application(s) Topical <User Schedule>  dextrose 5%. 1000 milliLiter(s) (100 mL/Hr) IV Continuous <Continuous>  dextrose 5%. 1000 milliLiter(s) (50 mL/Hr) IV Continuous <Continuous>  dextrose 50% Injectable 25 Gram(s) IV Push once  dextrose 50% Injectable 12.5 Gram(s) IV Push once  dextrose 50% Injectable 25 Gram(s) IV Push once  etoposide (TOPOSAR) IVPB (eMAR) 58 milliGRAM(s) IV Intermittent every 24 hours  glucagon  Injectable 1 milliGRAM(s) IntraMuscular once  heparin   Injectable 6500 Unit(s) IV Push once  heparin  Infusion.  Unit(s)/Hr (14 mL/Hr) IV Continuous <Continuous>  hydrALAZINE 25 milliGRAM(s) Oral every 8 hours  insulin lispro (ADMELOG) corrective regimen sliding scale   SubCutaneous Before meals and at bedtime  metoprolol succinate ER 25 milliGRAM(s) Oral daily  multivitamin 1 Tablet(s) Oral daily  sevelamer carbonate 800 milliGRAM(s) Oral three times a day with meals    MEDICATIONS  (PRN):  dextrose Oral Gel 15 Gram(s) Oral once PRN Blood Glucose LESS THAN 70 milliGRAM(s)/deciliter  heparin   Injectable 6500 Unit(s) IV Push every 6 hours PRN For aPTT less than 40  heparin   Injectable 3000 Unit(s) IV Push every 6 hours PRN For aPTT between 40 - 57  melatonin 3 milliGRAM(s) Oral at bedtime PRN Insomnia  sodium chloride 0.9% lock flush 10 milliLiter(s) IV Push every 1 hour PRN Pre/post blood products, medications, blood draw, and to maintain line patency          PHYSICAL EXAM:  Vital Signs Last 24 Hrs  T(C): 36.4 (03 Sep 2022 01:15), Max: 36.7 (02 Sep 2022 20:06)  T(F): 97.6 (03 Sep 2022 01:15), Max: 98.1 (02 Sep 2022 20:06)  HR: 100 (03 Sep 2022 04:29) (68 - 101)  BP: 148/80 (03 Sep 2022 04:29) (118/65 - 151/79)  BP(mean): --  RR: 18 (03 Sep 2022 04:29) (18 - 19)  SpO2: 95% (03 Sep 2022 04:29) (95% - 100%)    Parameters below as of 03 Sep 2022 04:29  Patient On (Oxygen Delivery Method): room air        I&O's Summary    02 Sep 2022 07:01  -  03 Sep 2022 07:00  --------------------------------------------------------  IN: 1487.9 mL / OUT: 2860 mL / NET: -1372.1 mL        General: NAD, non-toxic appearing   HEENT: PERRLA, EOMi, no scleral icterus  CV: RRR, normal S1 and S2, no m/r/g  Lungs: normal respiratory effort. CTAB, no wheezes, rales, or rhonchi  Abd: soft, nontender, nondistended  Ext: no edema, 2+ peripheral pulses   Pysch: AAOx3, appropriate affect   Neuro: grossly non-focal, moving all extremities spontaneously   Skin: no rashes or lesions     LABS:  CAPILLARY BLOOD GLUCOSE      POCT Blood Glucose.: 197 mg/dL (03 Sep 2022 08:07)  POCT Blood Glucose.: 250 mg/dL (02 Sep 2022 21:16)  POCT Blood Glucose.: 227 mg/dL (02 Sep 2022 17:47)  POCT Blood Glucose.: 242 mg/dL (02 Sep 2022 16:24)  POCT Blood Glucose.: 213 mg/dL (02 Sep 2022 11:50)                              9.7    12.42 )-----------( 258      ( 03 Sep 2022 06:17 )             32.3       WBC Trend: 12.42<--, 13.70<--, 13.41<--  Hb Trend: 9.7<--, 10.1<--, 9.2<--, 9.0<--, 8.7<--    0903    151<H>  |  107  |  81<H>  ----------------------------<  204<H>  3.5   |  28  |  1.48<H>    Ca    9.0      03 Sep 2022 06:17  Phos  4.4       Mg     2.1         TPro  7.6  /  Alb  3.6  /  TBili  1.2  /  DBili  x   /  AST  17  /  ALT  16  /  AlkPhos  146<H>  09-03    PTT - ( 03 Sep 2022 06:17 )  PTT:85.7 sec      Urinalysis Basic - ( 01 Sep 2022 12:01 )    Color: Light Yellow / Appearance: Clear / S.011 / pH: x  Gluc: x / Ketone: Negative  / Bili: Negative / Urobili: Negative   Blood: x / Protein: Negative / Nitrite: Negative   Leuk Esterase: Negative / RBC: x / WBC x   Sq Epi: x / Non Sq Epi: x / Bacteria: x        Culture - Urine (collected 01 Sep 2022 12:01)  Source: Clean Catch Clean Catch (Midstream)  Final Report (02 Sep 2022 12:39):    <10,000 CFU/mL Normal Urogenital Slime    Culture - Blood (collected 01 Sep 2022 12:00)  Source: .Blood Blood-Peripheral  Preliminary Report (02 Sep 2022 17:02):    No growth to date.          RADIOLOGY & ADDITIONAL TESTS: Reviewed

## 2022-09-03 NOTE — PROGRESS NOTE ADULT - SUBJECTIVE AND OBJECTIVE BOX
INTERVAL HPI/OVERNIGHT EVENTS:    Patient S&E at bedside. No o/n events. Feeling tired. Mental status and breathing better  Tolerating chemo ok    VITAL SIGNS:  T(F): 97.6 (22 @ 01:15)  HR: 100 (22 @ 04:29)  BP: 148/80 (22 @ 04:29)  RR: 18 (22 @ 04:29)  SpO2: 95% (22 @ 04:29)  Wt(kg): --    PHYSICAL EXAM:    Constitutional: NAD  Eyes: EOMI, sclera non-icteric  Neck: supple, no masses, no JVD  Respiratory: CTA b/l, good air entry b/l  Cardiovascular: RRR, no M/R/G  Gastrointestinal: soft, NTND,   Extremities: no c/c/e  Neurological: AAOx 2      MEDICATIONS  (STANDING):  acyclovir   Oral Tab/Cap 400 milliGRAM(s) Oral two times a day  allopurinol 100 milliGRAM(s) Oral daily  atorvastatin 80 milliGRAM(s) Oral at bedtime  buMETAnide Injectable 2 milliGRAM(s) IV Push two times a day  chlorhexidine 2% Cloths 1 Application(s) Topical daily  chlorhexidine 4% Liquid 1 Application(s) Topical <User Schedule>  dextrose 5%. 1000 milliLiter(s) (100 mL/Hr) IV Continuous <Continuous>  dextrose 5%. 1000 milliLiter(s) (50 mL/Hr) IV Continuous <Continuous>  dextrose 50% Injectable 25 Gram(s) IV Push once  dextrose 50% Injectable 12.5 Gram(s) IV Push once  dextrose 50% Injectable 25 Gram(s) IV Push once  etoposide (TOPOSAR) IVPB (eMAR) 58 milliGRAM(s) IV Intermittent every 24 hours  glucagon  Injectable 1 milliGRAM(s) IntraMuscular once  heparin  Infusion.  Unit(s)/Hr (14 mL/Hr) IV Continuous <Continuous>  hydrALAZINE 25 milliGRAM(s) Oral every 8 hours  insulin lispro (ADMELOG) corrective regimen sliding scale   SubCutaneous Before meals and at bedtime  metoprolol succinate ER 25 milliGRAM(s) Oral daily  multivitamin 1 Tablet(s) Oral daily  sevelamer carbonate 800 milliGRAM(s) Oral three times a day with meals    MEDICATIONS  (PRN):  dextrose Oral Gel 15 Gram(s) Oral once PRN Blood Glucose LESS THAN 70 milliGRAM(s)/deciliter  heparin   Injectable 6500 Unit(s) IV Push every 6 hours PRN For aPTT less than 40  heparin   Injectable 3000 Unit(s) IV Push every 6 hours PRN For aPTT between 40 - 57  melatonin 3 milliGRAM(s) Oral at bedtime PRN Insomnia  sodium chloride 0.9% lock flush 10 milliLiter(s) IV Push every 1 hour PRN Pre/post blood products, medications, blood draw, and to maintain line patency      Allergies    No Known Allergies    Intolerances        LABS:                        9.7    12.42 )-----------( 258      ( 03 Sep 2022 06:17 )             32.3         151<H>  |  107  |  81<H>  ----------------------------<  204<H>  3.5   |  28  |  1.48<H>    Ca    9.0      03 Sep 2022 06:17  Phos  4.4       Mg     2.1         TPro  7.6  /  Alb  3.6  /  TBili  1.2  /  DBili  x   /  AST  17  /  ALT  16  /  AlkPhos  146<H>      PTT - ( 03 Sep 2022 06:17 )  PTT:85.7 sec  Urinalysis Basic - ( 01 Sep 2022 12:01 )    Color: Light Yellow / Appearance: Clear / S.011 / pH: x  Gluc: x / Ketone: Negative  / Bili: Negative / Urobili: Negative   Blood: x / Protein: Negative / Nitrite: Negative   Leuk Esterase: Negative / RBC: x / WBC x   Sq Epi: x / Non Sq Epi: x / Bacteria: x        RADIOLOGY & ADDITIONAL TESTS:  Studies reviewed.    ASSESSMENT & PLAN:

## 2022-09-03 NOTE — PROGRESS NOTE ADULT - ASSESSMENT
72 year old male with significant history of HTN, CKD stage III, complete heart block (PPM in place), HLD, HFrEF (45% in 2019), and DLBCL (tx with R-CHOP in 2003, with relapse in 2009 treated with BR) now with recently diagnosed grade 3 follicular lymphoma on 8/23 who presents with anemia and SOB, patient was pending a surgical biopsy on 8/25. However, pt was found to be in TLS with associated nausea and fatigue on 8/23; at this time rasburicase was started and lowered the uric acid from 13 to 6, however, pt developed rasburicase-triggered G6PD hemolytic anemia with associated jaundice and dark colored urine as well as hemoglobin of 5.7 and hypotension on 8/26 requiring further evaluation. During hosp pt had CHF as well. started on iv bumex.  had echo revealing e 20%.     1- CKD III   2- CHF   3- Tumor lysis   4- HTN      creatinine better   decrease bumex 2mg iv qd   he is becoming hypernatremic   liberalize fluid intake    hypokalemia, kcl supplement  trend k+  allopurinol 300 mg daily   entresto started by chf team   strict I/O  trend creatinine and electrolytes daily

## 2022-09-03 NOTE — PROGRESS NOTE ADULT - ASSESSMENT
72M hx DLBCL (tx with R-CHOP in 2003, with relapse in 2009 treated with BR) now with multiple areas of palpable lymphadenopathy with biopsies shown to be at least grade IIIA follicular lymphoma. Has had significant weight loss >20lbs in the last 6 months. Also noted to have an IgG lambda, IgG Kappa, and IgM Lambda monoclonal gammopathies. Prior to being sent to the ED his labs were suggestive of TLS with UA 13.7 s/p 3mg rasburicase on 8/23.      #Follicular Lymphoma  #Hemolytic Anemia  -Follows with Dr. Cordova at [facility].   -Originally diagnosed in 2003 s/p RCHOP with relapse in 2009 s/p BR. Recent outpatient PET/CT 8/2022 showed concern for POD with new LAD and subcutaneous tissue nodules  ---s/p FNA L cervical LN in June 2022 with path c/w grade 3A follicular lymphoma positive for BCL6 (3q27) breakpoint translocation and negative for MYC Rearrangement or BCL2-IGH gene rearrangement [translocation t(14;18) present in ~ 85% of [place]].   ---Planned for excisional biopsy outpatient to confirm suspected 3B FL vs. transformation, but unable to be completed due to current admission. Patient presented with spontaneous TLS, now requiring inpatient treatment. Ideally, we would require an excisional biopsy to confirm the suspected diagnosis. However, patient is not optimized medically for an excisional biopsy inpatient, so we will treat for a presumptive diagnosis of relapsed FL with regimen below so as not to delay treatment:  -Patient is on treatment with a modified regimen of mini-COEP plus Obinutuzumab. Started on 9/1/22. Today is D3:  ------Cyclophosphamide 400 mg/m² on D1 (9/1)  ------Etoposide 40% dose reduced to 30 mg/m2 IV on D1-D3 of each cycle (9/1-9/3)  ------Vincristine 2mg (flat dose) on D1 (9/1)  ------Prednisone 100mg PO daily x5 days (started on 8/29-9/2)  ------Obinutuzumab 100mg test dose on D1 + 900mg full dose on D2 (9/1, (9/2)  -Excisional biopsy should be completed as soon as possible once patient is medically cleared, but as above, we will still plan to proceed with treatment.    #G6PD Deficiency  #Concern for TLS  -Patient is G6PD Deficient (G6PD 4.8) and is very high risk for TLS in the s/o spontaneous TLS on admission. He may require further doses of Rasburicase which can cause hemolytic anemia in the setting of G6PD Deficiency  -He is s/p rasburicase on 8/23 outpatient with development of hemolytic anemia and is now s/p 4u pRBC on this admission--repeat G6PD on 8/27 following transfusions now 11.1  -As we are initiating treatment today, we are anticipating patient may develop TLS and we may have to initiate HD as we cannot safely administer rasburicase  -Please check TLS labs BID. Please ensure to check CMP, Mg, Phos, LDH, Uric acid.  ---If Uric Acid is >10, please give 3mg Rasburicase. If Rasburicase is given, please check CBC BID as patient is high risk for hemolysis. Please contact the Hematology fellow on call prior to administration of Rasburicase. Please call the Hematology fellow on call if there are any concerning findings on the TLS labs.  -Nephrology consulted. Appreciate recommendations

## 2022-09-03 NOTE — ADVANCED PRACTICE NURSE CONSULT - RECOMMEDATIONS
Strict I & O's   Monitor Labs  Encourage fluids 
will continue to monitor for labs , I/O, N/V 
will continue to monitor for any adverse effects , I/O , N/v

## 2022-09-03 NOTE — PROVIDER CONTACT NOTE (OTHER) - ACTION/TREATMENT ORDERED:
Per Sergio Melara MD continue monitoring and notifying if event recurs. Primary RN Salvador made aware.

## 2022-09-03 NOTE — PROGRESS NOTE ADULT - PROBLEM SELECTOR PLAN 1
heme/onc      - s/p 1 session chemotherapy: TLS labs significant for uric acid 8.2, phosphorus 5.6      - most recent G6PD level wnl following multiple transfusions of pRBC      - patient is at high risk for TLS but still requires chemotherapy iso lymphoma, comfortable to give rasburicase if needed with improved G6PD levels       - as per note, f/u fellow for uric acid >8, may need rasburicase 3mg --> 9/2 AM labs with uric acid 8.2, as per fellow hold off rasburicase for now       - will f/u TLS labs following second session 9/2 -> will f/u fellow if uric acid level elevated or other concerns on TLS labs       - on allopurinol 100mg   - as per heme/onc patient is at high risk for TLS but still requires chemotherapy iso lymphoma

## 2022-09-04 ENCOUNTER — TRANSCRIPTION ENCOUNTER (OUTPATIENT)
Age: 72
End: 2022-09-04

## 2022-09-04 LAB
ALBUMIN SERPL ELPH-MCNC: 3.9 G/DL — SIGNIFICANT CHANGE UP (ref 3.3–5)
ALBUMIN SERPL ELPH-MCNC: 3.9 G/DL — SIGNIFICANT CHANGE UP (ref 3.3–5)
ALP SERPL-CCNC: 140 U/L — HIGH (ref 40–120)
ALP SERPL-CCNC: 163 U/L — HIGH (ref 40–120)
ALT FLD-CCNC: 17 U/L — SIGNIFICANT CHANGE UP (ref 10–45)
ALT FLD-CCNC: 19 U/L — SIGNIFICANT CHANGE UP (ref 10–45)
ANION GAP SERPL CALC-SCNC: 10 MMOL/L — SIGNIFICANT CHANGE UP (ref 5–17)
ANION GAP SERPL CALC-SCNC: 18 MMOL/L — HIGH (ref 5–17)
APTT BLD: 68.8 SEC — HIGH (ref 27.5–35.5)
APTT BLD: 70.5 SEC — HIGH (ref 27.5–35.5)
AST SERPL-CCNC: 21 U/L — SIGNIFICANT CHANGE UP (ref 10–40)
AST SERPL-CCNC: 22 U/L — SIGNIFICANT CHANGE UP (ref 10–40)
BILIRUB SERPL-MCNC: 1.1 MG/DL — SIGNIFICANT CHANGE UP (ref 0.2–1.2)
BILIRUB SERPL-MCNC: 1.4 MG/DL — HIGH (ref 0.2–1.2)
BUN SERPL-MCNC: 63 MG/DL — HIGH (ref 7–23)
BUN SERPL-MCNC: 71 MG/DL — HIGH (ref 7–23)
CALCIUM SERPL-MCNC: 9.1 MG/DL — SIGNIFICANT CHANGE UP (ref 8.4–10.5)
CALCIUM SERPL-MCNC: 9.8 MG/DL — SIGNIFICANT CHANGE UP (ref 8.4–10.5)
CHLORIDE SERPL-SCNC: 103 MMOL/L — SIGNIFICANT CHANGE UP (ref 96–108)
CHLORIDE SERPL-SCNC: 104 MMOL/L — SIGNIFICANT CHANGE UP (ref 96–108)
CO2 SERPL-SCNC: 25 MMOL/L — SIGNIFICANT CHANGE UP (ref 22–31)
CO2 SERPL-SCNC: 30 MMOL/L — SIGNIFICANT CHANGE UP (ref 22–31)
CREAT SERPL-MCNC: 1.26 MG/DL — SIGNIFICANT CHANGE UP (ref 0.5–1.3)
CREAT SERPL-MCNC: 1.35 MG/DL — HIGH (ref 0.5–1.3)
EGFR: 56 ML/MIN/1.73M2 — LOW
EGFR: 61 ML/MIN/1.73M2 — SIGNIFICANT CHANGE UP
GLUCOSE BLDC GLUCOMTR-MCNC: 125 MG/DL — HIGH (ref 70–99)
GLUCOSE BLDC GLUCOMTR-MCNC: 141 MG/DL — HIGH (ref 70–99)
GLUCOSE BLDC GLUCOMTR-MCNC: 149 MG/DL — HIGH (ref 70–99)
GLUCOSE BLDC GLUCOMTR-MCNC: 176 MG/DL — HIGH (ref 70–99)
GLUCOSE SERPL-MCNC: 115 MG/DL — HIGH (ref 70–99)
GLUCOSE SERPL-MCNC: 204 MG/DL — HIGH (ref 70–99)
HAPTOGLOB SERPL-MCNC: 159 MG/DL — SIGNIFICANT CHANGE UP (ref 34–200)
HCT VFR BLD CALC: 38.5 % — LOW (ref 39–50)
HGB BLD-MCNC: 11.4 G/DL — LOW (ref 13–17)
LDH SERPL L TO P-CCNC: 331 U/L — HIGH (ref 50–242)
LDH SERPL L TO P-CCNC: 380 U/L — HIGH (ref 50–242)
MAGNESIUM SERPL-MCNC: 1.9 MG/DL — SIGNIFICANT CHANGE UP (ref 1.6–2.6)
MCHC RBC-ENTMCNC: 25.9 PG — LOW (ref 27–34)
MCHC RBC-ENTMCNC: 29.6 GM/DL — LOW (ref 32–36)
MCV RBC AUTO: 87.5 FL — SIGNIFICANT CHANGE UP (ref 80–100)
NRBC # BLD: 0 /100 WBCS — SIGNIFICANT CHANGE UP (ref 0–0)
PHOSPHATE SERPL-MCNC: 1.6 MG/DL — LOW (ref 2.5–4.5)
PLATELET # BLD AUTO: 255 K/UL — SIGNIFICANT CHANGE UP (ref 150–400)
POTASSIUM SERPL-MCNC: 3.6 MMOL/L — SIGNIFICANT CHANGE UP (ref 3.5–5.3)
POTASSIUM SERPL-MCNC: 3.7 MMOL/L — SIGNIFICANT CHANGE UP (ref 3.5–5.3)
POTASSIUM SERPL-SCNC: 3.6 MMOL/L — SIGNIFICANT CHANGE UP (ref 3.5–5.3)
POTASSIUM SERPL-SCNC: 3.7 MMOL/L — SIGNIFICANT CHANGE UP (ref 3.5–5.3)
PROT SERPL-MCNC: 7.5 G/DL — SIGNIFICANT CHANGE UP (ref 6–8.3)
PROT SERPL-MCNC: 8.2 G/DL — SIGNIFICANT CHANGE UP (ref 6–8.3)
RBC # BLD: 4.4 M/UL — SIGNIFICANT CHANGE UP (ref 4.2–5.8)
RBC # FLD: 19.9 % — HIGH (ref 10.3–14.5)
SODIUM SERPL-SCNC: 143 MMOL/L — SIGNIFICANT CHANGE UP (ref 135–145)
SODIUM SERPL-SCNC: 147 MMOL/L — HIGH (ref 135–145)
URATE SERPL-MCNC: 8.9 MG/DL — HIGH (ref 3.4–8.8)
URATE SERPL-MCNC: 9.4 MG/DL — HIGH (ref 3.4–8.8)
WBC # BLD: 9.5 K/UL — SIGNIFICANT CHANGE UP (ref 3.8–10.5)
WBC # FLD AUTO: 9.5 K/UL — SIGNIFICANT CHANGE UP (ref 3.8–10.5)

## 2022-09-04 PROCEDURE — 71046 X-RAY EXAM CHEST 2 VIEWS: CPT | Mod: 26

## 2022-09-04 PROCEDURE — 99233 SBSQ HOSP IP/OBS HIGH 50: CPT

## 2022-09-04 PROCEDURE — 99232 SBSQ HOSP IP/OBS MODERATE 35: CPT | Mod: GC

## 2022-09-04 RX ORDER — SODIUM,POTASSIUM PHOSPHATES 278-250MG
2 POWDER IN PACKET (EA) ORAL
Refills: 0 | Status: COMPLETED | OUTPATIENT
Start: 2022-09-04 | End: 2022-09-04

## 2022-09-04 RX ADMIN — Medication 400 MILLIGRAM(S): at 06:37

## 2022-09-04 RX ADMIN — HEPARIN SODIUM 1600 UNIT(S)/HR: 5000 INJECTION INTRAVENOUS; SUBCUTANEOUS at 00:38

## 2022-09-04 RX ADMIN — HEPARIN SODIUM 1600 UNIT(S)/HR: 5000 INJECTION INTRAVENOUS; SUBCUTANEOUS at 07:26

## 2022-09-04 RX ADMIN — Medication 1 TABLET(S): at 12:29

## 2022-09-04 RX ADMIN — Medication 2 PACKET(S): at 17:41

## 2022-09-04 RX ADMIN — Medication 25 MILLIGRAM(S): at 06:37

## 2022-09-04 RX ADMIN — SEVELAMER CARBONATE 800 MILLIGRAM(S): 2400 POWDER, FOR SUSPENSION ORAL at 08:42

## 2022-09-04 RX ADMIN — ATORVASTATIN CALCIUM 40 MILLIGRAM(S): 80 TABLET, FILM COATED ORAL at 22:05

## 2022-09-04 RX ADMIN — Medication 40 MILLIEQUIVALENT(S): at 02:43

## 2022-09-04 RX ADMIN — CHLORHEXIDINE GLUCONATE 1 APPLICATION(S): 213 SOLUTION TOPICAL at 06:41

## 2022-09-04 RX ADMIN — HEPARIN SODIUM 1600 UNIT(S)/HR: 5000 INJECTION INTRAVENOUS; SUBCUTANEOUS at 02:32

## 2022-09-04 RX ADMIN — HEPARIN SODIUM 3000 UNIT(S): 5000 INJECTION INTRAVENOUS; SUBCUTANEOUS at 00:07

## 2022-09-04 RX ADMIN — Medication 2 PACKET(S): at 10:32

## 2022-09-04 RX ADMIN — HEPARIN SODIUM 1600 UNIT(S)/HR: 5000 INJECTION INTRAVENOUS; SUBCUTANEOUS at 16:51

## 2022-09-04 RX ADMIN — Medication 100 MILLIGRAM(S): at 12:28

## 2022-09-04 RX ADMIN — HEPARIN SODIUM 1600 UNIT(S)/HR: 5000 INJECTION INTRAVENOUS; SUBCUTANEOUS at 08:42

## 2022-09-04 RX ADMIN — SACUBITRIL AND VALSARTAN 1 TABLET(S): 24; 26 TABLET, FILM COATED ORAL at 06:37

## 2022-09-04 RX ADMIN — Medication 400 MILLIGRAM(S): at 17:40

## 2022-09-04 RX ADMIN — SACUBITRIL AND VALSARTAN 1 TABLET(S): 24; 26 TABLET, FILM COATED ORAL at 17:40

## 2022-09-04 RX ADMIN — CHLORHEXIDINE GLUCONATE 1 APPLICATION(S): 213 SOLUTION TOPICAL at 12:29

## 2022-09-04 RX ADMIN — HEPARIN SODIUM 1600 UNIT(S)/HR: 5000 INJECTION INTRAVENOUS; SUBCUTANEOUS at 19:31

## 2022-09-04 NOTE — PROGRESS NOTE ADULT - PROBLEM SELECTOR PLAN 4
Patient found to have acute-subacute stroke   - Defer pharmacologic stress test given stroke; obtain neuro clearance

## 2022-09-04 NOTE — PROGRESS NOTE ADULT - ATTENDING COMMENTS
Patient see and examined at bedside. Patient is warm and well perfused, he appears dry. Would hold diuresis and give diuretic PRN. Would stop hydralazine and increased entresto to mid dose tomorrow. C/w metoprolol XL 25mg daily

## 2022-09-04 NOTE — PROGRESS NOTE ADULT - ATTENDING COMMENTS
71yo M w/ extensive PMHx including DLBCL, G6PD deficiency, complete heart block s/p PPM, HFrEF (new EF 20%), CKD 3b, paroxysmal atrial fibrillation, presents with anemia, pt was treated for TLS syndrome w/ rasburicase on 8/23 and current episode concerning for an acute hemolytic anemia in setting of G6PD deficiency. Patient also p/w acute hypoxic respiratory failure due to heart failure exacerbation requiring bipap initially. He was eventually titrated off bipap and nasal cannula, now breathing comfortably on room air after initiation of diuretics.    #Hemolytic anemia due to G6PD deficiency and rasburicase  -s/p transfusion, counts stable, continue to trend CBC  -c/w A/C (eliquis switched to hep gtt in anticipation of bx)  -heme following - if uric acid >10 will need another dose of rasburicase, will trend TLS labs and CBC closely    #Follicular lymphoma  #TLS  -consulted renal for TLS  -completed prednisone and inpatient lymphoma treatment per heme   -cont allopurinol   -biopsy will be done w/local anesthesia Wednesday    #acute on chronic systolic heart failure  -heart failure recs appreciated, consulting cardio-oncology now too  -per neuro OK to aim for normotension - off hydralazine to give room to start entresto.   -switched coreg to metoprolol due to soft BPs  -off bumex due to hypernatremia  -echo shows EF of 20%    Metabolic encephalopathy  - patient has been oriented to self and sometimes place for the past few days, but no acute changes in mentation  - CT head incidentally found to have acute/subacute R parietal stroke - neurology recs appreciated. Obtain MRI/MRA H/N  - elevated wbc count is likely due to steroid initiation. Cultures ngtd

## 2022-09-04 NOTE — DISCHARGE NOTE PROVIDER - NPI NUMBER (FOR SYSADMIN USE ONLY) :
[[id number] [[id number]] [[id number]]

## 2022-09-04 NOTE — DISCHARGE NOTE PROVIDER - NSDCCPCAREPLAN_GEN_ALL_CORE_FT
PRINCIPAL DISCHARGE DIAGNOSIS  Diagnosis: Tumor lysis syndrome  Assessment and Plan of Treatment: We found that you had tumor lysis syndrome when you first came in. This occurs when cells rapidly burst in the body and causes the toxins to be released into your blood stream. We found this on your labs and treated you with a medication called Rasburicase. This resulted in your red cells bursting open and releasing their contents as well. We treated this by giving you blood transfusions. This likely occured in the setting of your follicular lymphoma and diffuse large B-cell lymphoma. It appears that you were heading to get a biopsy prior to your shortness of breath that radha you in. We think you are stable now after you received chemotherapy. We think you are safe for discharge with follow up with heme/onc for further treatment following the biopsy result.      SECONDARY DISCHARGE DIAGNOSES  Diagnosis: Anemia  Assessment and Plan of Treatment: You came in with shortness of breath and we found that you had low blood cell counts. This was after we gave you some treatment for tumor lysis syndrome which we found on your lab values. We treated this by giving you blood transfusion and your red blood cell numbers have stayed stable afterwards for the rest of the hospitalization. We think you are stable for discharge with follow up with your heme/onc doctor for further management. Please also follow up with your primary care doctor.    Diagnosis: DLBCL (diffuse large B cell lymphoma)  Assessment and Plan of Treatment: It seems that you had a new diagnosis of follicular lymphoma that may have caused your tumor lysis syndrome when you came in. We treated the tumor lysis syndrome and the anemia that develoepd afterwards. We had our heme/oncology doctors take a look at you and they suggested that we obtain a biopsy and start chemotherapy while you are in the hospital. We were unable to get a biopsy because of your other medical conditions but we did start you on chemotherapy in order to prevent any delays in your treatment. You completed 3 rounds of chemotherapy and we kept a close eye on you to make sure that nothing bad happens. We monitored your heart and we regularily checked your blood values to make sure that everything looked ok. We had several doctors making sure everything looked ok. Please follow up with your heme/onc doctor for further management.    Diagnosis: Encephalopathy  Assessment and Plan of Treatment: You seemed a little confused when you first came in. Your family let us know that this is a common thing that happens whenever you are sick. We wanted to make sure that there was no infection happening and so we checked your blood work to make sure no bacteria was growing. We also wanted to take some imaging of your brain to see if there are any changes and we found that you had a stroke. It appears that the stroke is in the same location as one that you may have had previously. We had our brain doctors take a look who wanted to get some extra imaging and so we did that.    Diagnosis: Shortness of breath  Assessment and Plan of Treatment: You were short of breath when you first came in. You have a history of heart failure and it appeared like you were volume overloaded when you came in. We had our cardiology team evaluate you and they suggested to start some medication to help remove the fluid. They also suggested medication to help lower your blood pressure to make your heart beat more efficiently. We kept these medications until your volume status looked better and we were more confident that your heart is functioning better. We started you on a new medication that we would like you to keep taking. This medication is called Entresto.     PRINCIPAL DISCHARGE DIAGNOSIS  Diagnosis: Tumor lysis syndrome  Assessment and Plan of Treatment: We found that you had tumor lysis syndrome when you first came in. This occurs when cells rapidly burst in the body and causes the toxins to be released into your blood stream. We found this on your labs and treated you with a medication called Rasburicase. This resulted in your red cells bursting open and releasing their contents as well. We treated this by giving you blood transfusions. This likely occured in the setting of your follicular lymphoma and diffuse large B-cell lymphoma. It appears that you were heading to get a biopsy prior to your shortness of breath that radha you in. You are stable now after you received chemotherapy.  Please follow up with heme/onc for further treatment following the biopsy result.  Please return to the hospital if you experience fever, chills, severe headache, dizziness, lightheadedness, loss of consciousness, visual disturbance, chest pain, palpitations, shortness of breath,  abdominal pain, nausea, vomiting, diarrhea, blood in your vomit/stool/urine, burning with urination or change in urinary pattern, numbness, weakness, tingling, or other alarming symptoms.      SECONDARY DISCHARGE DIAGNOSES  Diagnosis: Shortness of breath  Assessment and Plan of Treatment: You were short of breath when you first came in. You have a history of heart failure and it appeared like you were volume overloaded when you came in. We had our cardiology team evaluate you and they suggested to start some medication to help remove the fluid. They also suggested medication to help lower your blood pressure to make your heart beat more efficiently. We kept these medications until your volume status looked better and we were more confident that your heart is functioning better. We started you on a new medication that we would like you to keep taking. This medication is called Entresto 49/51 mg twice a day. We also started you on aldactone 25 mg once a day We started you on metoprolol succinate 100 mg once a day. Please stop taking your lisinopril, and carvedilol.       Diagnosis: Anemia  Assessment and Plan of Treatment: You came in with shortness of breath and we found that you had low blood cell counts. This was after we gave you some treatment for tumor lysis syndrome which we found on your lab values. We treated this by giving you blood transfusion and your red blood cell numbers have stayed stable afterwards for the rest of the hospitalization. You are stable for discharge and to follow up with your heme/onc doctor for further management. Please also follow up with your primary care doctor.    Diagnosis: DLBCL (diffuse large B cell lymphoma)  Assessment and Plan of Treatment: Your follicular lymphoma that may have caused your tumor lysis syndrome when you came in. We treated the tumor lysis syndrome and the anemia that develoepd afterwards. We had our heme/oncology doctors take a look at you and we obtained a biopsy while you were in the hospital.. You completed 3 rounds of chemotherapy and we monitored you to make sure you weres stable. We monitored your heart and we regularily checked your blood values to make sure that everything looked ok. We had several doctors making sure everything looked ok. Please follow up with your heme/onc doctor for further management.    Diagnosis: Encephalopathy  Assessment and Plan of Treatment: You seemed a little confused when you first came in. Your family let us know that this is a common thing that happens whenever you are sick. We wanted to make sure that there was no infection happening and so we checked your blood work to make sure no bacteria was growing. We also wanted to take some imaging of your brain to see if there are any changes and we found that you had a stroke. It appears that the stroke is in the same location as one that you may have had previously. We did an MRI that showed

## 2022-09-04 NOTE — PROGRESS NOTE ADULT - PROBLEM SELECTOR PLAN 1
Na at 151, uptrend from prior 140s, could be related to volume depletion iso diuresis vs DI iso recent neuro event  Differential is broad but will obtain better evaluation of volume status  - pending serum osm, will have on repeat labs as below  - pending urine osm, Tirso Na at 151->149->147, uptrend from prior 140s, could be related to volume depletion iso diuresis vs DI iso recent neuro event  - serum osm yesterday was 333, urine osm 399  - hyperosmolar hypernatremia likely iso diuretic use, improvement today with downtrending Na, c/w monitor

## 2022-09-04 NOTE — DISCHARGE NOTE PROVIDER - NSFOLLOWUPCLINICS_GEN_ALL_ED_FT
[facility]  Cardiology  [address] [place] [address]  Phone: [phone]  Fax:   Established Patient  Follow Up Time: 2 weeks

## 2022-09-04 NOTE — DISCHARGE NOTE PROVIDER - CARE PROVIDERS DIRECT ADDRESSES
,[email or web address],[email or web address] ,[email or web address],DirectAddress_Unknown,DirectAddress_Unknown

## 2022-09-04 NOTE — PROGRESS NOTE ADULT - PROBLEM SELECTOR PLAN 2
heme/onc      - s/p 1 session chemotherapy: TLS labs significant for uric acid 8.2, phosphorus 5.6      - s/p 2 sessions chemotherapy: TLS labs significant for uric acid 8.3, phosphorus 5.4 s/p sevelamer       - session 3 chemotherapy (last session) today      - most recent G6PD level 11.1 (wnl) following multiple transfusions of pRBC      - patient is at high risk for TLS but still requires chemotherapy iso lymphoma, comfortable to give rasburicase if needed with improved G6PD levels --> as per heme/onc if uric acid > 10, contact fellow first prior to 3mg rasburicase      - 9/3 AM with uric acid 8.9, phos 4.4, potassium wnl  - on allopurinol 100mg  - will f/u TLS labs after chemotherapy ends heme/onc      - s/p 1 session chemotherapy: TLS labs significant for uric acid 8.2, phosphorus 5.6      - s/p 2 sessions chemotherapy: TLS labs significant for uric acid 8.3, phosphorus 5.4 s/p sevelamer       - s/p 3 sessions chemotherapy: TLS labs significant for uric acid 9.1, phosphorus 2.9      - most recent G6PD level 11.1 (wnl) following multiple transfusions of pRBC      - patient is at high risk for TLS but still requires chemotherapy iso lymphoma, comfortable to give rasburicase if needed with improved G6PD levels --> as per heme/onc if uric acid > 10, contact fellow first prior to 3mg rasburicase      - 9/3 AM with uric acid 8.9, phos 4.4, potassium wnl      - 9/4 AM with uric acid 9.4, phos 1.6, will d/c sevelamer   - on allopurinol 100mg  - uric acid still under threshold set up heme/onc will c/w monitor for now, if uptrending further will reach out for further recs, no more scheduled sessions of chemotherapy heme/onc      - s/p 1 session chemotherapy: TLS labs significant for uric acid 8.2, phosphorus 5.6      - s/p 2 sessions chemotherapy: TLS labs significant for uric acid 8.3, phosphorus 5.4 s/p sevelamer       - s/p 3 sessions chemotherapy: TLS labs significant for uric acid 9.1, phosphorus 2.9      - most recent G6PD level 11.1 (wnl) following multiple transfusions of pRBC      - patient is at high risk for TLS but still requires chemotherapy iso lymphoma, comfortable to give rasburicase if needed with improved G6PD levels --> as per heme/onc if uric acid > 10, contact fellow first prior to 3mg rasburicase      - 9/3 AM with uric acid 8.9, phos 4.4, potassium wnl      - 9/4 AM with uric acid 9.4, phos 1.6, will d/c sevelamer   - on allopurinol 100mg  - uric acid still under threshold set by heme/onc will c/w monitor for now, if uptrending will reach out for further recs, no more scheduled sessions of chemotherapy

## 2022-09-04 NOTE — DISCHARGE NOTE PROVIDER - HOSPITAL COURSE
72 year old male with significant history of HTN, CKD stage II, complete heart block (PPM in place), HLD, HFrEF (45% in 2019), and DLBCL (tx with [facility] in 2003, with relapse in 2009 treated with BR) now with recently diagnosed grade 3 follicular lymphoma on 8/23 who presents with anemia and SOB, patient was pending a surgical biopsy on 8/25. However, pt was found to be in TLS with associated nausea and fatigue on 8/23; at this time rasburicase was started and lowered the uric acid from 13 to 6, however, pt developed rasburicase-triggered G6PD hemolytic anemia with associated jaundice and dark colored urine as well as hemoglobin of 5.7 and hypotension on 8/26 requiring further evaluation. In ED, the pt received up to 2 units of pRBC with improvement in hemoglobin to 6.1 from 5.7 s/p 1st unit and improvement to hgb 7.1 s/p 2nd unit. The pt was also hypotensive and tachypneic with respiratory acidosis. MICU was consulted at bedside and recommend caution dose of  cc bolus and albumin with slight improvement in MAPs to be > 65 mmHg. Regarding the pt's respiratory distress, he was escalated to BiPAP. Hospital course complicated by EKG with QTc prolongation and elevated troponin, magnesium replaced, Blood cultures were collected and in process. The pt admitted to medicine, he reports improvement in his breathing on bipap, denies any chest pain, increased swelling, or abdominal pain, N/V/D, hematochezia or hematuria at this time. Pt is tachypneic, but improved from arrival. Pt recommended to call pt's daughter, no answer. Reviewed pt's documentation.    Hospital Course:  Found to have TLS on admission and was treated with Rasburicase. Subsequently developed Rasburicase associated hemolytic anemia iso G6PD deficiency. Was given 4 units of pRBCs with improvement in labs. Evaluated by nephrology who suggested starting 100mg Allopurinol with an increase to 300mg daily. Evaluated by cardiology who found patient to be in volume overload and started diuresis and blood pressure management with hydralazine. Echo performed showing global systolic dysfunction with EF of 30%. Entresto was started following improvement in SCr following diuresis. Heme/onc evaluated patient and determined urgent need to start chemotherapy for new found follicular lymphoma on admission. Preferred to have surgical biopsy of lesion on the back in order to target treatment, however, surgery wanted cardiac clearance prior to biopsy due to general anesthesia requirement. Discussion with cardiology and surgery resulting in decision to pursue local anesthesia iso no medical clearance due to volume status. Biopsy performed on wednesday with pathology showing..... heme onc rec.... 72 year old male with significant history of HTN, CKD stage II, complete heart block (PPM in place), HLD, HFrEF (45% in 2019), and DLBCL (tx with [facility] in 2003, with relapse in 2009 treated with BR) now with recently diagnosed grade 3 follicular lymphoma on 8/23 who presents with anemia and SOB, patient was pending a surgical biopsy on 8/25. However, pt was found to be in TLS with associated nausea and fatigue on 8/23; at this time rasburicase was started and lowered the uric acid from 13 to 6, however, pt developed rasburicase-triggered G6PD hemolytic anemia with associated jaundice and dark colored urine as well as hemoglobin of 5.7 and hypotension on 8/26 requiring further evaluation. In ED, the pt received up to 2 units of pRBC with improvement in hemoglobin to 6.1 from 5.7 s/p 1st unit and improvement to hgb 7.1 s/p 2nd unit. The pt was also hypotensive and tachypneic with respiratory acidosis. MICU was consulted at bedside and recommend caution dose of  cc bolus and albumin with slight improvement in MAPs to be > 65 mmHg. Regarding the pt's respiratory distress, he was escalated to BiPAP. Hospital course complicated by EKG with QTc prolongation and elevated troponin, magnesium replaced, Blood cultures were collected and in process. The pt admitted to medicine, he reports improvement in his breathing on bipap, denies any chest pain, increased swelling, or abdominal pain, N/V/D, hematochezia or hematuria at this time. Pt is tachypneic, but improved from arrival. Pt recommended to call pt's daughter, no answer. Reviewed pt's documentation.    Hospital Course:  72 year old male with significant history of HTN, CKD stage II, complete heart block (PPM in place), HLD, HFrEF (45% in 2019), and DLBCL (tx with [facility] in 2003, with relapse in 2009 treated with BR) now with recently diagnosed grade 3 follicular lymphoma on 8/23 who was admitted for tumor lysis syndrome. Patient was treated with rasburicase. Patient developed hemolytic anemia 2/2 to G6PD deficiency and the usage of rasburicase. Patient was transfused 4 U pRBC with appropriate response. Patient was started on allopurinol. Patient was found to be in acute exacerbation of HFrEF. Patient was diuresed and put on GDMT. Oncology followed patient and initiated chemotherapy during admission for management of follicular lymphoma. Patient received treatment of "mini-COEP" + obinutuzumab. Patient received cyclophosphamide on 9/1, etoposide 9/1-9/3, vincristine 9/1, prednisone 8/29-9/2. Patient received Obinutuzumab on 9/1, 9/2, and on 9/9 prior to discharge. During admission patient was found to have altered mental status. Daughter reports patient becomes easily confused in the hospital. CT head showing acute/subacute right-sided parietal lobe infarction. 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 MRA of head and neck was limited given patient reporting claustrophobia and moving during study but no notable stenosis of the cerebral vasculature that was able to be read. Likely origin of CVA was embolic given patient history of afib. Patient has been restarted on home dose of eliquis 5 mg BID. Patient was monitored for signs of TLS after initiation of inpatient chemotherapy with q12 labs, however patient did not have signs of TLS. Patient received excisional biopsy under local anesthesia (patient too high risk for general, ADHF, CVA hx, TLS) of his upper back for a mass  concerning for malignancy. Patient is to follow with oncology, neurology, heart failure, cardiology, and PCP.    72 year old male with significant history of HTN, CKD stage II, complete heart block (PPM in place), HLD, HFrEF (45% in 2019), and DLBCL (tx with [facility] in 2003, with relapse in 2009 treated with BR) now with recently diagnosed grade 3 follicular lymphoma on 8/23 who presents with anemia and SOB, patient was pending a surgical biopsy on 8/25. However, pt was found to be in TLS with associated nausea and fatigue on 8/23; at this time rasburicase was started and lowered the uric acid from 13 to 6, however, pt developed rasburicase-triggered G6PD hemolytic anemia with associated jaundice and dark colored urine as well as hemoglobin of 5.7 and hypotension on 8/26 requiring further evaluation. In ED, the pt received up to 2 units of pRBC with improvement in hemoglobin to 6.1 from 5.7 s/p 1st unit and improvement to hgb 7.1 s/p 2nd unit. The pt was also hypotensive and tachypneic with respiratory acidosis. MICU was consulted at bedside and recommend caution dose of  cc bolus and albumin with slight improvement in MAPs to be > 65 mmHg. Regarding the pt's respiratory distress, he was escalated to BiPAP. Hospital course complicated by EKG with QTc prolongation and elevated troponin, magnesium replaced, Blood cultures were collected and in process. The pt admitted to medicine, he reports improvement in his breathing on bipap, denies any chest pain, increased swelling, or abdominal pain, N/V/D, hematochezia or hematuria at this time. Pt is tachypneic, but improved from arrival. Pt recommended to call pt's daughter, no answer. Reviewed pt's documentation.    Hospital Course:  72 year old male with significant history of HTN, CKD stage II, complete heart block (PPM in place), HLD, HFrEF (45% in 2019), and DLBCL (tx with [facility] in 2003, with relapse in 2009 treated with BR) now with recently diagnosed grade 3 follicular lymphoma on 8/23 who was admitted for tumor lysis syndrome. Patient was treated with rasburicase. Patient developed hemolytic anemia 2/2 to rasburicase in the setting of G6PD deficiency. Patient was transfused 4 U pRBC with appropriate response. Patient was started on allopurinol. Patient was found to be in acute exacerbation of HFrEF. Patient was diuresed, placed on BiPAP and put on GDMT. Oncology followed patient and initiated chemotherapy during admission for management of follicular lymphoma. Patient received treatment of "mini-COEP" + obinutuzumab. Patient received cyclophosphamide on 9/1, etoposide 9/1-9/3, vincristine 9/1, prednisone 8/29-9/2. Patient received Obinutuzumab on 9/1, 9/2, and on 9/9 prior to discharge. During admission patient was found to have altered mental status. Daughter reports patient becomes easily confused in the hospital. CT head showing acute/subacute right-sided parietal lobe infarction. 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 MRA of head and neck was limited given patient reporting claustrophobia and moving during study but no notable stenosis of the cerebral vasculature was noted of images that were interpretable. Likely origin of CVA was embolic given patient history of afib. Patient has been restarted on home dose of eliquis 5 mg BID. Patient was monitored for signs of TLS after initiation of inpatient chemotherapy with q12 labs, however patient did not have signs of TLS. Patient received excisional biopsy under local anesthesia (patient too high risk for general, ADHF, CVA hx, TLS) of his upper back for a mass concerning for malignancy. Patient is hemodynamically stable, and saturating well on room air. Patient is to follow with oncology, neurology, heart failure, cardiology, and PCP.    72 year old male with significant history of HTN, CKD stage II, complete heart block (PPM in place), HLD, HFrEF (45% in 2019), and DLBCL (tx with [facility] in 2003, with relapse in 2009 treated with BR) now with recently diagnosed grade 3 follicular lymphoma on 8/23 who was admitted for anemia and SOB and found to be in tumor lysis syndrome. Patient was treated with rasburicase. Patient developed hemolytic anemia 2/2 to rasburicase in the setting of G6PD deficiency. Patient was transfused 4 U pRBC with appropriate response. Patient was started on allopurinol for TLS prophylaxis. Patient was found to be in acute exacerbation of HFrEF. Patient was diuresed, placed on BiPAP and put on GDMT (entresto 49/51, metoprolol 100 mg, and aldactone). Course was complicated by elevated troponin, likely secondary to demand ischemia. Course was also complicated by prolonged QT to 600ms, likely medication-induced, however improved on repeat EKGs. Oncology followed patient and initiated chemotherapy during admission for management of follicular lymphoma. Patient received treatment of "mini-COEP" + obinutuzumab. Patient received cyclophosphamide on 9/1, etoposide 9/1-9/3, vincristine 9/1, prednisone 8/29-9/2. Patient received Obinutuzumab on 9/1, 9/2, and on 9/9 prior to discharge. During admission patient was found to have altered mental status. Daughter reports patient becomes easily confused in the hospital. CT head showing acute/subacute right-sided parietal lobe infarction. 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 MRA of head and neck was limited given patient reporting claustrophobia and moving during study but no notable stenosis of the cerebral vasculature was noted of images that were interpretable. Likely origin of CVA was embolic given patient history of afib. Patient has been restarted on home dose of eliquis 5 mg BID. Patient was monitored for signs of TLS after initiation of inpatient chemotherapy with q12 labs, however patient did not have signs of TLS.  Patient was started vicenta cylcovir during initiation of chemotherapy and is to continue while on treatment. Patient received excisional biopsy under local anesthesia (patient too high risk for general, ADHF, CVA hx, TLS) of his upper back for a mass concerning for malignancy. Patient is hemodynamically stable, and saturating well on room air. Patient is to follow with oncology, neurology, heart failure, cardiology, and PCP.    72 year old male with significant history of HTN, CKD stage II, complete heart block (PPM in place), HLD, HFrEF (45% in 2019), and DLBCL (tx with [facility] in 2003, with relapse in 2009 treated with BR) now with recently diagnosed grade 3 follicular lymphoma on 8/23 who was admitted for anemia and SOB and found to be in tumor lysis syndrome. Patient was treated with rasburicase. Patient developed hemolytic anemia 2/2 to rasburicase in the setting of G6PD deficiency. Patient was transfused 4 U pRBC with appropriate response. Patient was started on allopurinol for TLS prophylaxis. Patient was found to be in acute exacerbation of HFrEF. Patient was diuresed, placed on BiPAP and put on GDMT (entresto 49/51, metoprolol 100 mg, and aldactone). Course was complicated by elevated troponin, likely secondary to demand ischemia. Course was also complicated by prolonged QT to 600ms, likely medication-induced, however improved on repeat EKGs. Oncology followed patient and initiated chemotherapy during admission for management of follicular lymphoma. Patient received treatment of "mini-COEP" + obinutuzumab. Patient received cyclophosphamide on 9/1, etoposide 9/1-9/3, vincristine 9/1, prednisone 8/29-9/2. Patient received Obinutuzumab on 9/1, 9/2, and on 9/9 prior to discharge. During admission patient was found to have altered mental status. Daughter reports patient becomes easily confused in the hospital. CT head showing acute/subacute right-sided parietal lobe infarction. 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 MRA of head and neck was limited given patient reporting claustrophobia and moving during study but no notable stenosis of the cerebral vasculature was noted of images that were interpretable. Likely origin of CVA was embolic given patient history of afib. Patient has been restarted on home dose of eliquis 5 mg BID. Patient was monitored for signs of TLS after initiation of inpatient chemotherapy with q12 labs, however patient did not have signs of TLS.  Patient was started vicenta cylcovir during initiation of chemotherapy and is to continue while on treatment. Patient received excisional biopsy under local anesthesia (patient too high risk for general, ADHF, CVA hx, TLS) of his upper back for a mass concerning for malignancy. Patient is hemodynamically stable, and saturating well on room air. Patient is pending results of excisional biopsy on 9/7. Patient is to receive obinutuzumab weekly, with the last dose on 9/9/22. Patient is to follow with oncology, neurology, heart failure, cardiology, and PCP.    72 year old male with significant history of afib, HTN, CKD stage II, complete heart block (PPM in place), HLD, HFrEF (45% in 2019), and DLBCL (tx with [facility] in 2003, with relapse in 2009 treated with BR) now with recently diagnosed grade 3 follicular lymphoma on 8/23 who was admitted for anemia and SOB and found to be in tumor lysis syndrome.     Patient was treated with rasburicase. Patient developed hemolytic anemia 2/2 to rasburicase in the setting of G6PD deficiency. Patient was transfused 4 U pRBC with appropriate response. Patient was started on allopurinol for TLS prophylaxis. Patient was found to be in acute exacerbation of HFrEF (TTE initially showed EF of 20% with subsequent improvement to 30% on Sept 2nd). Patient was diuresed, placed on BiPAP and put on GDMT (entresto 49/51, metoprolol 100 mg, and aldactone). Course was complicated by elevated troponin, likely secondary to demand ischemia. Course was also complicated by prolonged QT to 600ms, likely medication-induced, however improved on repeat EKGs. Oncology followed patient and initiated chemotherapy during admission for management of follicular lymphoma. Patient received treatment of "mini-COEP" + obinutuzumab. Patient received cyclophosphamide on 9/1, etoposide 9/1-9/3, vincristine 9/1, prednisone 8/29-9/2. Patient received Obinutuzumab on 9/1, 9/2, and on 9/9 prior to discharge. During admission patient was found to have altered mental status. Daughter reports patient becomes easily confused in the hospital. CT head showing acute/subacute right-sided parietal lobe infarction. 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 MRA of head and neck was limited given patient reporting claustrophobia and moving during study but no notable stenosis of the cerebral vasculature was noted of images that were interpretable. Likely origin of CVA was embolic given patient history of afib. Patient has been restarted on home dose of eliquis 5 mg BID for afib. Patient was monitored for signs of TLS after initiation of inpatient chemotherapy with q12 labs, however patient did not have signs of TLS.  Patient was started on acylcovir during initiation of chemotherapy and is to continue while on treatment. Patient received excisional biopsy under local anesthesia (patient too high risk for general, ADHF, CVA hx, TLS) of his upper back for a mass concerning for malignancy. Patient is hemodynamically stable, and saturating well on room air. Patient is pending results of excisional biopsy on 9/7. Patient is to receive obinutuzumab weekly, with the last dose on 9/9/22. Patient is to follow with oncology, neurology, heart failure, cardiology, and PCP.    72 year old male with significant history of afib, HTN, CKD stage II, complete heart block (PPM in place), HLD, HFrEF (45% in 2019), and DLBCL (tx with [facility] in 2003, with relapse in 2009 treated with BR) now with recently diagnosed grade 3 follicular lymphoma on 8/23 who was admitted for anemia and SOB and found to be in tumor lysis syndrome.     Patient was treated with rasburicase. Patient developed hemolytic anemia 2/2 to rasburicase in the setting of G6PD deficiency. Patient was transfused 4 U pRBC with appropriate response. Patient was started on allopurinol for TLS prophylaxis. Patient was found to be in acute exacerbation of HFrEF (TTE initially showed EF of 20% with subsequent improvement to 30% on Sept 2nd). Patient was diuresed, placed on BiPAP and put on GDMT (entresto 49/51, metoprolol 100 mg, and aldactone). Course was complicated by elevated troponin, likely secondary to demand ischemia. Course was also complicated by prolonged QT to 600ms, likely medication-induced, however improved on repeat EKGs. Oncology followed patient and initiated chemotherapy during admission for management of follicular lymphoma. Patient received treatment of "mini-COEP" + obinutuzumab. Patient received cyclophosphamide on 9/1, etoposide 9/1-9/3, vincristine 9/1, prednisone 8/29-9/2. Patient received Obinutuzumab on 9/1, 9/2, and on 9/9 prior to discharge. During admission patient was found to have altered mental status. Daughter reports patient becomes easily confused in the hospital. CT head showing acute/subacute right-sided parietal lobe infarction. 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 MRA of head and neck was limited given patient reporting claustrophobia and moving during study but no notable stenosis of the cerebral vasculature was noted of images that were interpretable. Likely origin of CVA was embolic given patient history of afib. Patient has been restarted on home dose of eliquis 5 mg BID for afib. Patient was monitored for signs of TLS after initiation of inpatient chemotherapy with q12 labs, however patient did not have signs of TLS.  Patient was started on acylcovir during initiation of chemotherapy and is to continue while on treatment. Patient received excisional biopsy under local anesthesia (patient too high risk for general, ADHF, CVA hx, TLS) of his upper back for a mass concerning for malignancy. Patient is hemodynamically stable, and saturating well on room air. Patient is pending results of excisional biopsy on 9/7. Patient is to receive obinutuzumab weekly, with the last dose on 9/9/22. Patient is to follow with oncology, neurology, heart failure, cardiology, and PCP.     72 year old male with significant history of afib, HTN, CKD stage II, complete heart block (PPM in place), HLD, HFrEF (45% in 2019), and DLBCL (tx with [facility] in 2003, with relapse in 2009 treated with BR) now with recently diagnosed grade 3 follicular lymphoma on 8/23 who was admitted for anemia and SOB and found to be in tumor lysis syndrome.     Patient was treated with rasburicase. Patient developed hemolytic anemia 2/2 to rasburicase in the setting of G6PD deficiency. Patient was transfused 4 U pRBC with appropriate response. Patient was started on allopurinol for TLS prophylaxis. Patient was found to be in acute exacerbation of HFrEF (TTE initially showed EF of 20% with subsequent improvement to 30% on Sept 2nd). Patient was diuresed, placed on BiPAP and put on GDMT (entresto 49/51, metoprolol 100 mg, and aldactone). Course was complicated by elevated troponin, likely secondary to demand ischemia. Course was also complicated by prolonged QT to 600ms, likely medication-induced, however improved on repeat EKGs. Oncology followed patient and initiated chemotherapy during admission for management of follicular lymphoma. Patient received treatment of "mini-COEP" + obinutuzumab. Patient received cyclophosphamide on 9/1, etoposide 9/1-9/3, vincristine 9/1, prednisone 8/29-9/2. Patient received Obinutuzumab on 9/1, 9/2, and on 9/9 prior to discharge. During admission patient was found to have altered mental status. Daughter reports patient becomes easily confused in the hospital. CT head showing acute/subacute right-sided parietal lobe infarction. 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 MRA of head and neck was limited given patient reporting claustrophobia and moving during study but no notable stenosis of the cerebral vasculature was noted of images that were interpretable. Likely origin of CVA was embolic given patient history of afib. Patient has been restarted on home dose of eliquis 5 mg BID for afib. Patient was monitored for signs of TLS after initiation of inpatient chemotherapy with q12 labs, however patient did not have signs of TLS.  Patient was started on acylcovir during initiation of chemotherapy and is to continue while on treatment. Patient received excisional biopsy under local anesthesia (patient too high risk for general, ADHF, CVA hx, TLS) of his upper back for a mass concerning for malignancy. Patient is hemodynamically stable, and saturating well on room air. Patient is pending results of excisional biopsy on 9/7. Patient is to receive obinutuzumab weekly, with the last dose on 9/9/22. Patient is to follow with oncology, neurology, heart failure, cardiology, and PCP.      Patient is to be discharged with PICC line and dressing care with [facility] infusion. Patient has appointment with [facility] today 9/12/22.    72 year old male with significant history of afib, HTN, CKD stage II, complete heart block (PPM in place), HLD, HFrEF (45% in 2019), and DLBCL (tx with [facility] in 2003, with relapse in 2009 treated with BR) now with recently diagnosed grade 3 follicular lymphoma on 8/23 who was admitted for anemia and SOB and found to be in tumor lysis syndrome.     Patient was treated with rasburicase. Patient developed hemolytic anemia 2/2 to rasburicase in the setting of G6PD deficiency. Patient was transfused 4 U pRBC with appropriate response. Patient was started on allopurinol for TLS prophylaxis. Patient was found to be in acute exacerbation of HFrEF (TTE initially showed EF of 20% with subsequent improvement to 30% on Sept 2nd). Patient was diuresed, placed on BiPAP and put on GDMT (entresto 49/51, metoprolol 100 mg, and aldactone). Course was complicated by elevated troponin, likely secondary to demand ischemia. Course was also complicated by prolonged QT to 600ms, likely medication-induced, however improved on repeat EKGs. Oncology followed patient and initiated chemotherapy during admission for management of follicular lymphoma. Patient received treatment of "mini-COEP" + obinutuzumab. Patient received cyclophosphamide on 9/1, etoposide 9/1-9/3, vincristine 9/1, prednisone 8/29-9/2. Patient received Obinutuzumab on 9/1, 9/2, and on 9/9 prior to discharge. During admission patient was found to have altered mental status. Daughter reports patient becomes easily confused in the hospital. CT head showing acute/subacute right-sided parietal lobe infarction. 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 MRA of head and neck was limited given patient reporting claustrophobia and moving during study but no notable stenosis of the cerebral vasculature was noted of images that were interpretable. Likely origin of CVA was embolic given patient history of afib. Patient has been restarted on home dose of eliquis 5 mg BID for afib. Patient was monitored for signs of TLS after initiation of inpatient chemotherapy with q12 labs, however patient did not have signs of TLS.  Patient was started on acylcovir during initiation of chemotherapy and is to continue while on treatment. Patient received excisional biopsy under local anesthesia (patient too high risk for general, ADHF, CVA hx, TLS) of his upper back for a mass concerning for malignancy. Patient is hemodynamically stable, and saturating well on room air. Patient is pending results of excisional biopsy on 9/7. Patient is to receive obinutuzumab weekly, with the last dose on 9/9/22. Patient is to follow with oncology, neurology, heart failure, cardiology, and PCP.      Patient is to receive mediport placement outpatient, arrangements will be made by Dr. Cordova, his oncologist.

## 2022-09-04 NOTE — PROGRESS NOTE ADULT - PROBLEM SELECTOR PLAN 6
History of HFrEF EF 45%, diffuse LV hypokinesis, mod pulmHTN; followed by cardiology outpatient, elevated proBNP with uptrending troponins, now downtrending. TTE (8/27) ef=20%, severe MR, mild LV enlargement, normal RA, RV enlargement with decreased RVSF, mild-mod tricuspid regurg, moderate pulm pressures, b/l pleural effusions   - c/w metoprolol tartrate 12.5mg BID (for discharge consider metoprolol succinate vs. resuming home Carvedilol at lower dose as per Cards)  - c/w hYDRALAZINE 10MG q8h for afterload reduction  - c/w bumex 2mg BID  - adding Entresto History of HFrEF EF 45%, diffuse LV hypokinesis, mod pulmHTN; followed by cardiology outpatient, elevated proBNP with uptrending troponins, now downtrending. TTE (8/27) ef=20%, severe MR, mild LV enlargement, normal RA, RV enlargement with decreased RVSF, mild-mod tricuspid regurg, moderate pulm pressures, b/l pleural effusions   - c/w metoprolol tartrate 12.5mg BID (for discharge consider metoprolol succinate vs. resuming home Carvedilol at lower dose as per Cards)  - adding Entresto

## 2022-09-04 NOTE — PROGRESS NOTE ADULT - ASSESSMENT
73 yo M w/ PMH CHB s/p PPM upgraded to CRT-P, HFrEF 2/2 chemo, DLBCL s/p chemo, HTN, nonobstructive CAD, Aflutter on apixaban who presented w/ SOB and anemia. 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 He was started on rasburicase for TLS but with notable G6PD hemolytics anemia. Found to be in heart failure exacerbation. Was started on BiPAP given his tachypnea. CXRAY showed R pleural effusion with known R middle lobe opacity.    Patient was planned for excisional biopsy under general anesthesia. However, patient with severe MR on ECHO, was still fluid overloaded, and elevated troponin without re-evaluation of his coronaries; thus was not medically optimized for general anesthesia, had recommended local anesthesia for biopsy of tissue sample. Heme-Onc following; decided to forgo biopsy and treat for presumed diagnosis of relapsed FL. Hospital course complicated by acute/subacute R parietal stroke.    Improved in terms of volume status with diuresis, weaned off BIPAP.       Echo:  9/2/22: LVEF 30%, severe MR, mildly decreased RV function. Similar to prior echo  8/27/22: LVEF 20%, decreased RV function, severe MR  11/7/2019: LVEF 40%, moderate pulmonary hypertension, enlarged LA size, severe mitral regurgitation    Cardiac Cath: 5/2019, 70% 1st diagonal, 60% distal circumflex, otherwise no occlusive disease      Pacemaker: 9/30/2019, [employer] [id number] BiV DDDR

## 2022-09-04 NOTE — PROGRESS NOTE ADULT - SUBJECTIVE AND OBJECTIVE BOX
Patient seen and examined at bedside.    Overnight Events: No acute events.             Current Meds:  acyclovir   Oral Tab/Cap 400 milliGRAM(s) Oral two times a day  allopurinol 100 milliGRAM(s) Oral daily  atorvastatin 40 milliGRAM(s) Oral at bedtime  chlorhexidine 2% Cloths 1 Application(s) Topical daily  chlorhexidine 4% Liquid 1 Application(s) Topical <User Schedule>  dextrose 5%. 1000 milliLiter(s) IV Continuous <Continuous>  dextrose 5%. 1000 milliLiter(s) IV Continuous <Continuous>  dextrose 50% Injectable 25 Gram(s) IV Push once  dextrose 50% Injectable 12.5 Gram(s) IV Push once  dextrose 50% Injectable 25 Gram(s) IV Push once  dextrose Oral Gel 15 Gram(s) Oral once PRN  glucagon  Injectable 1 milliGRAM(s) IntraMuscular once  heparin   Injectable 6500 Unit(s) IV Push every 6 hours PRN  heparin   Injectable 3000 Unit(s) IV Push every 6 hours PRN  heparin  Infusion.  Unit(s)/Hr IV Continuous <Continuous>  hydrALAZINE 25 milliGRAM(s) Oral every 8 hours  insulin lispro (ADMELOG) corrective regimen sliding scale   SubCutaneous Before meals and at bedtime  melatonin 3 milliGRAM(s) Oral at bedtime PRN  metoprolol succinate ER 25 milliGRAM(s) Oral daily  multivitamin 1 Tablet(s) Oral daily  sacubitril 24 mG/valsartan 26 mG 1 Tablet(s) Oral two times a day  sevelamer carbonate 800 milliGRAM(s) Oral three times a day with meals  sodium chloride 0.9% lock flush 10 milliLiter(s) IV Push every 1 hour PRN      Vitals:  T(F): 97.9 (09-04), Max: 97.9 (09-03)  HR: 91 (09-04) (80 - 92)  BP: 135/71 (09-04) (126/66 - 144/72)  RR: 18 (09-04)  SpO2: 97% (09-04)  I&O's Summary    03 Sep 2022 07:01  -  04 Sep 2022 07:00  --------------------------------------------------------  IN: 2382.9 mL / OUT: 1830 mL / NET: 552.9 mL    04 Sep 2022 07:01  -  04 Sep 2022 09:16  --------------------------------------------------------  IN: 220 mL / OUT: 0 mL / NET: 220 mL        Physical Exam:  Appearance: No Acute Distress  HEENT: PERRL  Neck: elevated JVD   Cardiovascular: RRR  Respiratory: crackles +, improved   Gastrointestinal: Soft, Non-tender	  Skin: No cyanosis	  Neurologic: Non-focal  Extremities: edema improved from day prior    Psychiatry: A & O x 2, Mood & affect appropriate                        11.4   9.50  )-----------( 255      ( 04 Sep 2022 07:19 )             38.5     09-04    147<H>  |  104  |  71<H>  ----------------------------<  115<H>  3.6   |  25  |  1.35<H>    Ca    9.8      04 Sep 2022 07:19  Phos  1.6     09-04  Mg     1.9     09-04    TPro  8.2  /  Alb  3.9  /  TBili  1.4<H>  /  DBili  x   /  AST  22  /  ALT  19  /  AlkPhos  163<H>  09-04    PTT - ( 04 Sep 2022 07:19 )  PTT:68.8 sec  CARDIAC MARKERS ( 29 Aug 2022 21:58 )  379 ng/L / x     / x     / x     / x     / x      CARDIAC MARKERS ( 29 Aug 2022 12:40 )  495 ng/L / x     / x     / x     / x     / x      CARDIAC MARKERS ( 29 Aug 2022 05:55 )  476 ng/L / x     / x     / x     / x     / x      CARDIAC MARKERS ( 28 Aug 2022 20:41 )  391 ng/L / x     / x     / x     / x     / x      CARDIAC MARKERS ( 28 Aug 2022 10:32 )  325 ng/L / x     / x     / x     / x     / x           Patient seen and examined at bedside.    Overnight Events: No acute events. Feels better this morning. No shortness of breath             Current Meds:  acyclovir   Oral Tab/Cap 400 milliGRAM(s) Oral two times a day  allopurinol 100 milliGRAM(s) Oral daily  atorvastatin 40 milliGRAM(s) Oral at bedtime  chlorhexidine 2% Cloths 1 Application(s) Topical daily  chlorhexidine 4% Liquid 1 Application(s) Topical <User Schedule>  dextrose 5%. 1000 milliLiter(s) IV Continuous <Continuous>  dextrose 5%. 1000 milliLiter(s) IV Continuous <Continuous>  dextrose 50% Injectable 25 Gram(s) IV Push once  dextrose 50% Injectable 12.5 Gram(s) IV Push once  dextrose 50% Injectable 25 Gram(s) IV Push once  dextrose Oral Gel 15 Gram(s) Oral once PRN  glucagon  Injectable 1 milliGRAM(s) IntraMuscular once  heparin   Injectable 6500 Unit(s) IV Push every 6 hours PRN  heparin   Injectable 3000 Unit(s) IV Push every 6 hours PRN  heparin  Infusion.  Unit(s)/Hr IV Continuous <Continuous>  hydrALAZINE 25 milliGRAM(s) Oral every 8 hours  insulin lispro (ADMELOG) corrective regimen sliding scale   SubCutaneous Before meals and at bedtime  melatonin 3 milliGRAM(s) Oral at bedtime PRN  metoprolol succinate ER 25 milliGRAM(s) Oral daily  multivitamin 1 Tablet(s) Oral daily  sacubitril 24 mG/valsartan 26 mG 1 Tablet(s) Oral two times a day  sevelamer carbonate 800 milliGRAM(s) Oral three times a day with meals  sodium chloride 0.9% lock flush 10 milliLiter(s) IV Push every 1 hour PRN      Vitals:  T(F): 97.9 (09-04), Max: 97.9 (09-03)  HR: 91 (09-04) (80 - 92)  BP: 135/71 (09-04) (126/66 - 144/72)  RR: 18 (09-04)  SpO2: 97% (09-04)  I&O's Summary    03 Sep 2022 07:01  -  04 Sep 2022 07:00  --------------------------------------------------------  IN: 2382.9 mL / OUT: 1830 mL / NET: 552.9 mL    04 Sep 2022 07:01  -  04 Sep 2022 09:16  --------------------------------------------------------  IN: 220 mL / OUT: 0 mL / NET: 220 mL        Physical Exam:  Appearance: No Acute Distress  HEENT: PERRL  Neck: no JVD   Cardiovascular: RRR  Respiratory: CTAB  Gastrointestinal: Soft, Non-tender	  Skin: No cyanosis	  Neurologic: Non-focal  Extremities: warm, no edema   Psychiatry: A & O x 2, Mood & affect appropriate                        11.4   9.50  )-----------( 255      ( 04 Sep 2022 07:19 )             38.5     09-04    147<H>  |  104  |  71<H>  ----------------------------<  115<H>  3.6   |  25  |  1.35<H>    Ca    9.8      04 Sep 2022 07:19  Phos  1.6     09-04  Mg     1.9     09-04    TPro  8.2  /  Alb  3.9  /  TBili  1.4<H>  /  DBili  x   /  AST  22  /  ALT  19  /  AlkPhos  163<H>  09-04    PTT - ( 04 Sep 2022 07:19 )  PTT:68.8 sec  CARDIAC MARKERS ( 29 Aug 2022 21:58 )  379 ng/L / x     / x     / x     / x     / x      CARDIAC MARKERS ( 29 Aug 2022 12:40 )  495 ng/L / x     / x     / x     / x     / x      CARDIAC MARKERS ( 29 Aug 2022 05:55 )  476 ng/L / x     / x     / x     / x     / x      CARDIAC MARKERS ( 28 Aug 2022 20:41 )  391 ng/L / x     / x     / x     / x     / x      CARDIAC MARKERS ( 28 Aug 2022 10:32 )  325 ng/L / x     / x     / x     / x     / x

## 2022-09-04 NOTE — DISCHARGE NOTE PROVIDER - NSDCMRMEDTOKEN_GEN_ALL_CORE_FT
acetaminophen 325 mg oral tablet: 2 tab(s) orally every 6 hours, As needed, Moderate Pain (4 - 6)  apixaban 5 mg oral tablet: 1 tab(s) orally every 12 hours  atorvastatin 40 mg oral tablet: 1 tab(s) orally once a day (at bedtime)  carvedilol 25 mg oral tablet: 1 tab(s) orally 2 times a day with food  docusate sodium 100 mg oral capsule: 2 cap(s) orally once a day (at bedtime), As Needed  ivabradine 5 mg oral tablet: 1 tab(s) orally 2 times a day (with meals)  lisinopril 40 mg oral tablet: 1 tab(s) orally once a day  senna oral tablet: 2 tab(s) orally once a day (at bedtime), As Needed   acetaminophen 325 mg oral tablet: 2 tab(s) orally every 6 hours, As needed, Moderate Pain (4 - 6)  apixaban 5 mg oral tablet: 1 tab(s) orally every 12 hours  atorvastatin 40 mg oral tablet: 1 tab(s) orally once a day (at bedtime)  carvedilol 25 mg oral tablet: 1 tab(s) orally 2 times a day with food  docusate sodium 100 mg oral capsule: 2 cap(s) orally once a day (at bedtime), As Needed  Entresto 49 mg-51 mg oral tablet: 1 tab(s) orally once a day   ivabradine 5 mg oral tablet: 1 tab(s) orally 2 times a day (with meals)  lisinopril 40 mg oral tablet: 1 tab(s) orally once a day  Physical Therapy - please evaluate and treat:   sacubitril-valsartan 49 mg-51 mg oral tablet: 1 tab(s) orally every 12 hours  senna oral tablet: 2 tab(s) orally once a day (at bedtime), As Needed   apixaban 5 mg oral tablet: 1 tab(s) orally every 12 hours  atorvastatin 40 mg oral tablet: 1 tab(s) orally once a day (at bedtime)  docusate sodium 100 mg oral capsule: 2 cap(s) orally once a day (at bedtime), As Needed  Physical Therapy - please evaluate and treat:   senna oral tablet: 2 tab(s) orally once a day (at bedtime), As Needed   allopurinol 100 mg oral tablet: 1 tab(s) orally once a day  apixaban 5 mg oral tablet: 1 tab(s) orally every 12 hours  atorvastatin 40 mg oral tablet: 1 tab(s) orally once a day (at bedtime)  docusate sodium 100 mg oral capsule: 2 cap(s) orally once a day (at bedtime), As Needed  Multiple Vitamins oral tablet: 1 tab(s) orally once a day  Physical Therapy - please evaluate and treat:   senna oral tablet: 2 tab(s) orally once a day (at bedtime), As Needed  spironolactone 25 mg oral tablet: 1 tab(s) orally once a day   acyclovir 400 mg oral tablet: 1 tab(s) orally 2 times a day  allopurinol 100 mg oral tablet: 1 tab(s) orally once a day  apixaban 5 mg oral tablet: 1 tab(s) orally every 12 hours  atorvastatin 40 mg oral tablet: 1 tab(s) orally once a day (at bedtime)  docusate sodium 100 mg oral capsule: 2 cap(s) orally once a day (at bedtime), As Needed  Entresto 49 mg-51 mg oral tablet: 1 tab(s) orally once a day   metoprolol succinate 100 mg oral tablet, extended release: 1 tab(s) orally once a day  Multiple Vitamins oral tablet: 1 tab(s) orally once a day  Physical Therapy - please evaluate and treat:   senna oral tablet: 2 tab(s) orally once a day (at bedtime), As Needed  spironolactone 25 mg oral tablet: 1 tab(s) orally once a day   0.9% Sodium chloride 10 mL : 0.9% sodium chloride 10 mL,  Please flush each lumen with 10 mL of 0.9% sodium chloride every 8 hours as needed  Z45.2  0.9% Sodium chloride 10 mL : Please flush each lumen with 10 mL of 0.9% sodium chloride every 8 hours as needed  Z45.2  acyclovir 400 mg oral tablet: 1 tab(s) orally 2 times a day  allopurinol 100 mg oral tablet: 1 tab(s) orally once a day  apixaban 5 mg oral tablet: 1 tab(s) orally every 12 hours  atorvastatin 40 mg oral tablet: 1 tab(s) orally once a day (at bedtime)  docusate sodium 100 mg oral capsule: 2 cap(s) orally once a day (at bedtime), As Needed  Entresto 49 mg-51 mg oral tablet: 1 tab(s) orally once a day   metoprolol succinate 100 mg oral tablet, extended release: 1 tab(s) orally once a day  Multiple Vitamins oral tablet: 1 tab(s) orally once a day  Physical Therapy - please evaluate and treat:   PICC Dressing Change: Once a week changes  Z45.2  senna oral tablet: 2 tab(s) orally once a day (at bedtime), As Needed  sodium chloride 0.9% injectable solution: 1 dose(s) injectable every 8 hours, As Needed     spironolactone 25 mg oral tablet: 1 tab(s) orally once a day

## 2022-09-04 NOTE — PROGRESS NOTE ADULT - PROBLEM SELECTOR PLAN 5
- h/o grade 3 follicular lymphoma, pt was pending surgical biopsy on 8/23, however in heme/onc outpt labs s/f TLS with uric acid 13.7, pt was started rasburicase on 8/23 and developed rasburicase-triggered G6PD hemolysis with hgb 5.7 on 8/26  - UA 6.4 from 13.7 s/p rasburicase on 8/23, now on allopurinol 100mg   - follow-up with heme:      - Started prednisone 100mg daily x5 (day 4/5) days with intent to start Obinutuzumab-COEP inpatient 9/1      - PICC placed (8/30) however patient removed it, PICC placed again (8/31) and ready for chemotherapy initiation      - trend TLS labs (CMP, Mg, Phos, LDH, Uric Acid) q12hrs as patient is high risk at 6am and 6pm daily      - s/p 1st session 9/1, pending 2nd session 9/2  - Surgery consulted for biopsy for definitive diagnosis      - can consider biopsy under local anesthesia, ongoing discussion involving cards, as per cards no need for medical clearance for local biopsy, pending biopsy possibly tomorrow   - cardiology consult      - as per HF team, patient still volume overloaded and not cleared medically for biopsy under general anesthesia      - increased hydralazine to 25mg TID with verification from neuro, drug can cause potential hypoperfusion of brain      - c/w current management with diuresis, metoprolol, hydralazine      - pending bladder scan to r/o obstruction  - cardiology oncology consult      - TTE with severe global left ventricular systolic dysfunction (EF 30%); severe mitral regurg, elevated pulmonary pressures      - will add Entresto today      - SGLT2 is possible - h/o grade 3 follicular lymphoma, pt was pending surgical biopsy on 8/23, however in heme/onc outpt labs s/f TLS with uric acid 13.7, pt was started rasburicase on 8/23 and developed rasburicase-triggered G6PD hemolysis with hgb 5.7 on 8/26  - UA 6.4 from 13.7 s/p rasburicase on 8/23, now on allopurinol 100mg   - follow-up with heme:      - Started prednisone 100mg daily x5 (day 4/5) days with intent to start Obinutuzumab-COEP inpatient 9/1      - PICC placed (8/30) however patient removed it, PICC placed again (8/31) and ready for chemotherapy initiation      - trend TLS labs (CMP, Mg, Phos, LDH, Uric Acid) q12hrs as patient is high risk at 6am and 6pm daily      - s/p 1st session 9/1, pending 2nd session 9/2  - Surgery consulted for biopsy for definitive diagnosis      - tentatively planned for Wednesday, neuro ok with biopsy  - cardiology consult      - as per HF team, patient still volume overloaded and not cleared medically for biopsy under general anesthesia      - increased hydralazine to 25mg TID with verification from neuro, drug can cause potential hypoperfusion of brain      - c/w current management with diuresis, metoprolol, hydralazine      - pending bladder scan to r/o obstruction  - cardiology oncology consult      - TTE with severe global left ventricular systolic dysfunction (EF 30%); severe mitral regurg, elevated pulmonary pressures      - will add Entresto today      - SGLT2 is possible - h/o grade 3 follicular lymphoma, pt was pending surgical biopsy on 8/23, however in heme/onc outpt labs s/f TLS with uric acid 13.7, pt was started rasburicase on 8/23 and developed rasburicase-triggered G6PD hemolysis with hgb 5.7 on 8/26  - UA 6.4 from 13.7 s/p rasburicase on 8/23, now on allopurinol 100mg   - follow-up with heme:      - Started prednisone 100mg daily x5 (day 4/5) days with intent to start Obinutuzumab-COEP inpatient 9/1      - PICC placed (8/30) however patient removed it, PICC placed again (8/31) and ready for chemotherapy initiation      - trend TLS labs (CMP, Mg, Phos, LDH, Uric Acid) q12hrs as patient is high risk at 6am and 6pm daily      - s/p 1st session 9/1, pending 2nd session 9/2  - Surgery consulted for biopsy for definitive diagnosis      - tentatively planned for Wednesday, neuro ok with biopsy  - cardiology consult      - c/w metoprolol, entresto      - stop diuresis, hydralazine   - cardiology oncology consult      - TTE with severe global left ventricular systolic dysfunction (EF 30%); severe mitral regurg, elevated pulmonary pressures      - added entresto      - SGLT2 is possible

## 2022-09-04 NOTE — DISCHARGE NOTE PROVIDER - NSDCCPTREATMENT_GEN_ALL_CORE_FT
PRINCIPAL PROCEDURE  Procedure: CT head wo con  Findings and Treatment: IMPRESSION: Acute/subacute right-sided parietal lobe infarction   superimposed upon a chronic infarct. Findings appear larger in size when   compared with 5/7/2019.  No hemorrhagic transformation at this time.  Similar-appearing mild chronic white matter microvascular type changes   and chronic lacunar infarct within the right basal ganglia.        SECONDARY PROCEDURE  Procedure: TTE (transthoracic echocardiography)  Findings and Treatment: Mitral Valve: Mitral annular calcification and calcified  mitral leaflets. Mitral annular dilatation with tethered  mitral valve leaflets. Severe mitral regurgitation.  Aortic Valve/Aorta: Calcified trileaflet aortic valve with  normal opening. Peak transaortic valve gradient equals 7 mm  Hg, mean transaortic valve gradient equals 3 mm Hg, aortic  valve velocity time integral equals 28 cm. Minimal aortic  regurgitation. Peak left ventricular outflow tract gradient  equals 2 mm Hg, mean gradient is equal to 1 mm Hg, LVOT  velocity time integral equals 16 cm.  Aortic Root: 3.8 cm.  LVOT diameter: 2.2 cm.  Left Atrium: Severely dilated left atrium.  LA volume index  = 49 cc/m2.  Left Ventricle: The apex is foreshortened on apical views  limiting left ventricular assessment. Severe global left  ventricular systolic dysfunction. Moderate left ventricular  enlargement. Indeterminate diastolic function.  Right Heart: Normal right atrium. Mild right ventricular  enlargement with mildly decreased right ventricular  systolic function. A device wire is noted in the right  heart. Normal tricuspid valve. Mild-moderate tricuspid  regurgitation. Pulmonic valve not well visualized, probably  normal. No pulmonic regurgitation.  Pericardium/Pleura: Normal pericardium with no pericardial  effusion.  Hemodynamic: Dilated IVC (diameterabout 2.3 cm) with less  than 50% respiratory variation in size consistent with  estimated RA pressure 15 mmHg. Estimated right ventricular  systolic pressure equals 59 mm Hg, assuming right atrial  pressure equals 15 mm Hg, consistent with moderate  pulmonary hypertension.  ------------------------------------------------------------------------  Conclusions:  1. Mitralannular calcification and calcified mitral  leaflets. Mitral annular dilatation with tethered mitral  valve leaflets. Severe mitral regurgitation.  2. Calcified trileaflet aortic valve with normal opening.  Minimal aortic regurgitation.  3. The apex is foreshortened on apical views limiting left  ventricular assessment. Severe gl     PRINCIPAL PROCEDURE  Procedure: MRI head  Findings and Treatment:   There is mild to moderate atrophy. Small vessel white matter ischemic   changes are noted. There is an old right parietal infarct. No acute   infarcts are identified on diffusion-weighted imaging. There is some   hemosiderin staining in the old parietal infarct..  The neck MRA is limited by motion and internal carotid artery stenosis is   not excluded. The right vertebral artery is dominant.  The distal cervical, petrous cavernous and supraclinoid internal carotid   arteries are normal. The anterior cerebral arteries and anterior   communicating artery are normal. The middle cerebral arteries are normal.   The basilar artery, posterior cerebral arteries and superior cerebellar   arteries are normal. The anterior inferior cerebellar arteries are   normal. Bilateral posterior communicating arteries are visualized.  IMPRESSION: Limited examination as the patient was unable to cooperate.   Old right parietal infarct. No acute infarcts. Normal intracranial   circulation, limited MRA of the neck due to motion.        SECONDARY PROCEDURE  Procedure: CT head wo con  Findings and Treatment: IMPRESSION: Acute/subacute right-sided parietal lobe infarction   superimposed upon a chronic infarct. Findings appear larger in size when   compared with 5/7/2019.  No hemorrhagic transformation at this time.  Similar-appearing mild chronic white matter microvascular type changes   and chronic lacunar infarct within the right basal ganglia.      Procedure: TTE (transthoracic echocardiography)  Findings and Treatment: Mitral Valve: Mitral annular calcification and calcified  mitral leaflets. Mitral annular dilatation with tethered  mitral valve leaflets. Severe mitral regurgitation.  Aortic Valve/Aorta: Calcified trileaflet aortic valve with  normal opening. Peak transaortic valve gradient equals 7 mm  Hg, mean transaortic valve gradient equals 3 mm Hg, aortic  valve velocity time integral equals 28 cm. Minimal aortic  regurgitation. Peak left ventricular outflow tract gradient  equals 2 mm Hg, mean gradient is equal to 1 mm Hg, LVOT  velocity time integral equals 16 cm.  Aortic Root: 3.8 cm.  LVOT diameter: 2.2 cm.  Left Atrium: Severely dilated left atrium.  LA volume index  = 49 cc/m2.  Left Ventricle: The apex is foreshortened on apical views  limiting left ventricular assessment. Severe global left  ventricular systolic dysfunction. Moderate left ventricular  enlargement. Indeterminate diastolic function.  Right Heart: Normal right atrium. Mild right ventricular  enlargement with mildly decreased right ventricular  systolic function. A device wire is noted in the right  heart. Normal tricuspid valve. Mild-moderate tricuspid  regurgitation. Pulmonic valve not well visualized, probably  normal. No pulmonic regurgitation.  Pericardium/Pleura: Normal pericardium with no pericardial  effusion.  Hemodynamic: Dilated IVC (diameterabout 2.3 cm) with less  than 50% respiratory variation in size consistent with  estimated RA pressure 15 mmHg. Estimated right ventricular  systolic pressure equals 59 mm Hg, assuming right atrial  pressure equals 15 mm Hg, consistent with moderate  pulmonary hypertension.  ------------------------------------------------------------------------  Conclusions:  1. Mitralannular calcification and calcified mitral  leaflets. Mitral annular dilatation with tethered mitral  valve leaflets. Severe mitral regurgitation.  2. Calcified trileaflet aortic valve with normal opening.  Minimal aortic regurgitation.  3. The apex is foreshortened on apical views limiting left  ventricular assessment. Severe gl

## 2022-09-04 NOTE — PROGRESS NOTE ADULT - PROBLEM SELECTOR PLAN 1
- Bumex   - c/w Hydralazine 25 mg q8h   - c/w Metoprolol XL 25 daily  - Entresto 24-26mg BID - Stop Bumex. Diuresis prn   - Stop Hydralazine 25 mg q8h   - c/w Metoprolol XL 25 daily  - Can increase Entresto 24-26mg BID tomorrow if BPs ok - Stop Bumex. Diuresis prn   - Stop Hydralazine 25 mg q8h   - c/w Metoprolol XL 25 daily  - Can increase Entresto 49-51mg BID tomorrow

## 2022-09-04 NOTE — DISCHARGE NOTE PROVIDER - NSDCFUSCHEDAPPT_GEN_ALL_CORE_FT
Lon Reyes  [facility]  CARDIOLOGY [address] [facility] [facility]   Scheduled Appointment: 09/06/2022    [facility]  ELECTROPH [address]  Scheduled Appointment: 09/23/2022    Amari Hickman  [facility]  Med GenInt [address] [facility]  Scheduled Appointment: 10/26/2022     [facility]  [facility]  Scheduled Appointment: 09/12/2022    [facility]  ELECTROPH [address]  Scheduled Appointment: 09/23/2022    Amari Hickman  [facility]  Med GenInt [address] [facility]  Scheduled Appointment: 10/26/2022     [facility]  ELECTROPH [address]  Scheduled Appointment: 09/23/2022    Amari Hickman  [facility]  Med GenInt [address] [facility]  Scheduled Appointment: 10/26/2022

## 2022-09-04 NOTE — DISCHARGE NOTE PROVIDER - NSDCFUADDAPPT_GEN_ALL_CORE_FT
Please follow up with your PCP within 2 weeks    Please follow up with your cardiologist and your electrophysiologist within 2 weeks    Please follow up with heme/onc within 2 weeks time Please follow up with your PCP within 2 weeks    Please follow up with your cardiologist and your electrophysiologist within 2 weeks    Please follow up with heme/onc within 2 weeks time    Please follow up with your heart failure specialist within 2 weeks.

## 2022-09-04 NOTE — PROGRESS NOTE ADULT - PROBLEM SELECTOR PLAN 2
Unclear etiology, chemotherapy related vs direct toxicity vs. heart failure exacerbation. Troponins peaked.  -If no c/i and no biopsy planned would start ASA.

## 2022-09-04 NOTE — DISCHARGE NOTE PROVIDER - PROVIDER TOKENS
PROVIDER:[TOKEN:[[id number]],FOLLOWUP:[1 week],ESTABLISHEDPATIENT:[T]],PROVIDER:[[id number]],FOLLOWUP:[1 week],ESTABLISHEDPATIENT:[T]] PROVIDER:[TOKEN:[[id number]],FOLLOWUP:[1 week],ESTABLISHEDPATIENT:[T]],PROVIDER:[[id number]],FOLLOWUP:[1 week],ESTABLISHEDPATIENT:[T]],PROVIDER:[TOKEN:[[id number]],FOLLOWUP:[2 weeks]] PROVIDER:[TOKEN:[[id number]],FOLLOWUP:[1 week],ESTABLISHEDPATIENT:[T]],PROVIDER:[[id number]],FOLLOWUP:[1 week],ESTABLISHEDPATIENT:[T]],PROVIDER:[[id number]],FOLLOWUP:[2 weeks]],PROVIDER:[TOKEN:[[id number]],SCHEDULEDAPPT:[09/15/2022],SCHEDULEDAPPTTIME:[03:00 PM]]

## 2022-09-04 NOTE — PROGRESS NOTE ADULT - TIME BILLING
Time spent at bedside interviewing and examining the patient, reviewing the chart and telemetry, and coordinating care with the primary team.  I counselled patient on the heart failure disease process and the ongoing medical therapy.
- Generation of cardiovascular treatment plan.  - Coordination of care with primary team.

## 2022-09-04 NOTE — PROGRESS NOTE ADULT - SUBJECTIVE AND OBJECTIVE BOX
Progress Note    08-27-22 (8d)    Patient is a 72y old  Male who presents with a chief complaint of follicular  B cell lymphoma relapse (03 Sep 2022 11:31)      Subjective / Overnight Events :  - No acute events overnight.  - Pt seen and examined at bedside.     Additional ROS (if any):    MEDICATIONS  (STANDING):  acyclovir   Oral Tab/Cap 400 milliGRAM(s) Oral two times a day  allopurinol 100 milliGRAM(s) Oral daily  atorvastatin 40 milliGRAM(s) Oral at bedtime  chlorhexidine 2% Cloths 1 Application(s) Topical daily  chlorhexidine 4% Liquid 1 Application(s) Topical <User Schedule>  dextrose 5%. 1000 milliLiter(s) (50 mL/Hr) IV Continuous <Continuous>  dextrose 5%. 1000 milliLiter(s) (100 mL/Hr) IV Continuous <Continuous>  dextrose 50% Injectable 25 Gram(s) IV Push once  dextrose 50% Injectable 12.5 Gram(s) IV Push once  dextrose 50% Injectable 25 Gram(s) IV Push once  glucagon  Injectable 1 milliGRAM(s) IntraMuscular once  heparin  Infusion.  Unit(s)/Hr (14 mL/Hr) IV Continuous <Continuous>  hydrALAZINE 25 milliGRAM(s) Oral every 8 hours  insulin lispro (ADMELOG) corrective regimen sliding scale   SubCutaneous Before meals and at bedtime  metoprolol succinate ER 25 milliGRAM(s) Oral daily  multivitamin 1 Tablet(s) Oral daily  sacubitril 24 mG/valsartan 26 mG 1 Tablet(s) Oral two times a day  sevelamer carbonate 800 milliGRAM(s) Oral three times a day with meals    MEDICATIONS  (PRN):  dextrose Oral Gel 15 Gram(s) Oral once PRN Blood Glucose LESS THAN 70 milliGRAM(s)/deciliter  heparin   Injectable 6500 Unit(s) IV Push every 6 hours PRN For aPTT less than 40  heparin   Injectable 3000 Unit(s) IV Push every 6 hours PRN For aPTT between 40 - 57  melatonin 3 milliGRAM(s) Oral at bedtime PRN Insomnia  sodium chloride 0.9% lock flush 10 milliLiter(s) IV Push every 1 hour PRN Pre/post blood products, medications, blood draw, and to maintain line patency          PHYSICAL EXAM:  Vital Signs Last 24 Hrs  T(C): 36.6 (04 Sep 2022 05:06), Max: 36.6 (03 Sep 2022 12:21)  T(F): 97.9 (04 Sep 2022 05:06), Max: 97.9 (03 Sep 2022 12:21)  HR: 91 (04 Sep 2022 05:06) (80 - 92)  BP: 135/71 (04 Sep 2022 05:06) (126/66 - 144/72)  BP(mean): --  RR: 18 (04 Sep 2022 05:06) (18 - 18)  SpO2: 97% (04 Sep 2022 05:06) (97% - 100%)    Parameters below as of 04 Sep 2022 05:06  Patient On (Oxygen Delivery Method): room air        I&O's Summary    02 Sep 2022 07:01  -  03 Sep 2022 07:00  --------------------------------------------------------  IN: 1487.9 mL / OUT: 2860 mL / NET: -1372.1 mL    03 Sep 2022 07:01  -  04 Sep 2022 06:49  --------------------------------------------------------  IN: 2382.9 mL / OUT: 1830 mL / NET: 552.9 mL        General: NAD, non-toxic appearing   HEENT: PERRLA, EOMi, no scleral icterus  CV: RRR, normal S1 and S2, no m/r/g  Lungs: normal respiratory effort. CTAB, no wheezes, rales, or rhonchi  Abd: soft, nontender, nondistended  Ext: no edema, 2+ peripheral pulses   Pysch: AAOx3, appropriate affect   Neuro: grossly non-focal, moving all extremities spontaneously   Skin: no rashes or lesions     LABS:  CAPILLARY BLOOD GLUCOSE      POCT Blood Glucose.: 137 mg/dL (03 Sep 2022 21:12)  POCT Blood Glucose.: 186 mg/dL (03 Sep 2022 16:09)  POCT Blood Glucose.: 233 mg/dL (03 Sep 2022 11:28)  POCT Blood Glucose.: 197 mg/dL (03 Sep 2022 08:07)                              9.7    10.95 )-----------( 227      ( 03 Sep 2022 16:42 )             32.6       WBC Trend: 10.95<--, 12.42<--, 13.70<--  Hb Trend: 9.7<--, 9.7<--, 10.1<--, 9.2<--, 9.0<--    09-03    149<H>  |  106  |  75<H>  ----------------------------<  169<H>  3.2<L>   |  29  |  1.36<H>    Ca    9.3      03 Sep 2022 16:44  Phos  2.9     09-03  Mg     2.0     09-03    TPro  7.6  /  Alb  3.7  /  TBili  1.1  /  DBili  x   /  AST  19  /  ALT  16  /  AlkPhos  149<H>  09-03    PTT - ( 03 Sep 2022 23:11 )  PTT:56.9 sec          Culture - Urine (collected 01 Sep 2022 12:01)  Source: Clean Catch Clean Catch (Midstream)  Final Report (02 Sep 2022 12:39):    <10,000 CFU/mL Normal Urogenital Slime    Culture - Blood (collected 01 Sep 2022 12:00)  Source: .Blood Blood-Peripheral  Preliminary Report (02 Sep 2022 17:02):    No growth to date.          RADIOLOGY & ADDITIONAL TESTS: Reviewed Progress Note    08-27-22 (8d)    Patient is a 72y old  Male who presents with a chief complaint of follicular  B cell lymphoma relapse (03 Sep 2022 11:31)      Subjective / Overnight Events :  - No acute events overnight.  - Pt seen and examined at bedside.     Additional ROS (if any):    MEDICATIONS  (STANDING):  acyclovir   Oral Tab/Cap 400 milliGRAM(s) Oral two times a day  allopurinol 100 milliGRAM(s) Oral daily  atorvastatin 40 milliGRAM(s) Oral at bedtime  chlorhexidine 2% Cloths 1 Application(s) Topical daily  chlorhexidine 4% Liquid 1 Application(s) Topical <User Schedule>  dextrose 5%. 1000 milliLiter(s) (50 mL/Hr) IV Continuous <Continuous>  dextrose 5%. 1000 milliLiter(s) (100 mL/Hr) IV Continuous <Continuous>  dextrose 50% Injectable 25 Gram(s) IV Push once  dextrose 50% Injectable 12.5 Gram(s) IV Push once  dextrose 50% Injectable 25 Gram(s) IV Push once  glucagon  Injectable 1 milliGRAM(s) IntraMuscular once  heparin  Infusion.  Unit(s)/Hr (14 mL/Hr) IV Continuous <Continuous>  hydrALAZINE 25 milliGRAM(s) Oral every 8 hours  insulin lispro (ADMELOG) corrective regimen sliding scale   SubCutaneous Before meals and at bedtime  metoprolol succinate ER 25 milliGRAM(s) Oral daily  multivitamin 1 Tablet(s) Oral daily  sacubitril 24 mG/valsartan 26 mG 1 Tablet(s) Oral two times a day  sevelamer carbonate 800 milliGRAM(s) Oral three times a day with meals    MEDICATIONS  (PRN):  dextrose Oral Gel 15 Gram(s) Oral once PRN Blood Glucose LESS THAN 70 milliGRAM(s)/deciliter  heparin   Injectable 6500 Unit(s) IV Push every 6 hours PRN For aPTT less than 40  heparin   Injectable 3000 Unit(s) IV Push every 6 hours PRN For aPTT between 40 - 57  melatonin 3 milliGRAM(s) Oral at bedtime PRN Insomnia  sodium chloride 0.9% lock flush 10 milliLiter(s) IV Push every 1 hour PRN Pre/post blood products, medications, blood draw, and to maintain line patency          PHYSICAL EXAM:  Vital Signs Last 24 Hrs  T(C): 36.6 (04 Sep 2022 05:06), Max: 36.6 (03 Sep 2022 12:21)  T(F): 97.9 (04 Sep 2022 05:06), Max: 97.9 (03 Sep 2022 12:21)  HR: 91 (04 Sep 2022 05:06) (80 - 92)  BP: 135/71 (04 Sep 2022 05:06) (126/66 - 144/72)  BP(mean): --  RR: 18 (04 Sep 2022 05:06) (18 - 18)  SpO2: 97% (04 Sep 2022 05:06) (97% - 100%)    Parameters below as of 04 Sep 2022 05:06  Patient On (Oxygen Delivery Method): room air        I&O's Summary    02 Sep 2022 07:01  -  03 Sep 2022 07:00  --------------------------------------------------------  IN: 1487.9 mL / OUT: 2860 mL / NET: -1372.1 mL    03 Sep 2022 07:01  -  04 Sep 2022 06:49  --------------------------------------------------------  IN: 2382.9 mL / OUT: 1830 mL / NET: 552.9 mL        General: NAD, non-toxic appearing   HEENT: PERRLA, EOMi, no scleral icterus  CV: RRR, normal S1 and S2, no m/r/g  Lungs: normal respiratory effort. CTAB, no wheezes, rales, or rhonchi  Abd: soft, nontender, nondistended  Ext: 2+ pitting edema B/l, 2+ peripheral pulses   Pysch: AAOx2, appropriate affect   Neuro: grossly non-focal, moving all extremities spontaneously   Skin: no rashes or lesions     LABS:  CAPILLARY BLOOD GLUCOSE      POCT Blood Glucose.: 137 mg/dL (03 Sep 2022 21:12)  POCT Blood Glucose.: 186 mg/dL (03 Sep 2022 16:09)  POCT Blood Glucose.: 233 mg/dL (03 Sep 2022 11:28)  POCT Blood Glucose.: 197 mg/dL (03 Sep 2022 08:07)                              9.7    10.95 )-----------( 227      ( 03 Sep 2022 16:42 )             32.6       WBC Trend: 10.95<--, 12.42<--, 13.70<--  Hb Trend: 9.7<--, 9.7<--, 10.1<--, 9.2<--, 9.0<--    09-03    149<H>  |  106  |  75<H>  ----------------------------<  169<H>  3.2<L>   |  29  |  1.36<H>    Ca    9.3      03 Sep 2022 16:44  Phos  2.9     09-03  Mg     2.0     09-03    TPro  7.6  /  Alb  3.7  /  TBili  1.1  /  DBili  x   /  AST  19  /  ALT  16  /  AlkPhos  149<H>  09-03    PTT - ( 03 Sep 2022 23:11 )  PTT:56.9 sec          Culture - Urine (collected 01 Sep 2022 12:01)  Source: Clean Catch Clean Catch (Midstream)  Final Report (02 Sep 2022 12:39):    <10,000 CFU/mL Normal Urogenital Slime    Culture - Blood (collected 01 Sep 2022 12:00)  Source: .Blood Blood-Peripheral  Preliminary Report (02 Sep 2022 17:02):    No growth to date.          RADIOLOGY & ADDITIONAL TESTS: Reviewed

## 2022-09-04 NOTE — DISCHARGE NOTE PROVIDER - CARE PROVIDER_API CALL
Amari Hickman)  Internal Medicine  [address] [facility], [address]  [place] [address]  Phone: [phone]  Fax: [phone]  Established Patient  Follow Up Time: 1 week    Lon Reyes)  Cardiovascular Disease; Internal Medicine; Nuclear Cardiology  [address] [facility] [facility] [address]  [place] [address]  Phone: [phone]  Fax: [phone]  Established Patient  Follow Up Time: 1 week   Amari Hickman)  Internal Medicine  [address] [facility], [address]  [place] [address]  Phone: [phone]  Fax: [phone]  Established Patient  Follow Up Time: 1 week    Lon Reyes)  Cardiovascular Disease; Internal Medicine; Nuclear Cardiology  [address] [facility]  [place] [address]  Phone: [phone]  Fax: [phone]  Established Patient  Follow Up Time: 1 week    Stone Mo)  Neurology; Vascular Neurology  [address] [facility] [address]  [place] [address]  Phone: [phone]  Fax: [phone]  Follow Up Time: 2 weeks   Amari Hickman (MD)  Internal Medicine  [address] [facility], [address]  [place] [address]  Phone: [phone]  Fax: [phone]  Established Patient  Follow Up Time: 1 week    Lon Reyes)  Cardiovascular Disease; Internal Medicine; Nuclear Cardiology  [address] [facility]  [place] [address]  Phone: [phone]  Fax: [phone]  Established Patient  Follow Up Time: 1 week    Stone Mo)  Neurology; Vascular Neurology  [address] [facility]  [place] [address]  Phone: [phone]  Fax: [phone]  Follow Up Time: 2 weeks    Jason Cordova (DO)  Internal Medicine; Medical Oncology  [address] [place] [address]  Phone: [phone]  Fax: [phone]  Scheduled Appointment: 09/15/2022 03:00 PM

## 2022-09-04 NOTE — PROGRESS NOTE ADULT - SUBJECTIVE AND OBJECTIVE BOX
INTERVAL HPI/OVERNIGHT EVENTS:  Patient S&E at bedside. No o/n events, patient resting comfortably.   No s/e from chemo      VITAL SIGNS:  T(F): 97.5 (09-04-22 @ 11:09)  HR: 79 (09-04-22 @ 11:09)  BP: 122/71 (09-04-22 @ 11:09)  RR: 18 (09-04-22 @ 11:09)  SpO2: 96% (09-04-22 @ 11:09)  Wt(kg): --    PHYSICAL EXAM:    Constitutional: NAD  Eyes: EOMI, sclera non-icteric  Neck: supple, no masses, no JVD  Respiratory: CTA b/l, good air entry b/l  Cardiovascular: RRR, no M/R/G  Gastrointestinal: soft, NTND  Extremities: no c/c/e  Neurological: AAOx 2      MEDICATIONS  (STANDING):  acyclovir   Oral Tab/Cap 400 milliGRAM(s) Oral two times a day  allopurinol 100 milliGRAM(s) Oral daily  atorvastatin 40 milliGRAM(s) Oral at bedtime  chlorhexidine 2% Cloths 1 Application(s) Topical daily  chlorhexidine 4% Liquid 1 Application(s) Topical <User Schedule>  dextrose 5%. 1000 milliLiter(s) (50 mL/Hr) IV Continuous <Continuous>  dextrose 5%. 1000 milliLiter(s) (100 mL/Hr) IV Continuous <Continuous>  dextrose 50% Injectable 25 Gram(s) IV Push once  dextrose 50% Injectable 12.5 Gram(s) IV Push once  dextrose 50% Injectable 25 Gram(s) IV Push once  glucagon  Injectable 1 milliGRAM(s) IntraMuscular once  heparin  Infusion.  Unit(s)/Hr (14 mL/Hr) IV Continuous <Continuous>  insulin lispro (ADMELOG) corrective regimen sliding scale   SubCutaneous Before meals and at bedtime  metoprolol succinate ER 25 milliGRAM(s) Oral daily  multivitamin 1 Tablet(s) Oral daily  potassium phosphate / sodium phosphate Powder (PHOS-NaK) 2 Packet(s) Oral two times a day  sacubitril 24 mG/valsartan 26 mG 1 Tablet(s) Oral two times a day    MEDICATIONS  (PRN):  dextrose Oral Gel 15 Gram(s) Oral once PRN Blood Glucose LESS THAN 70 milliGRAM(s)/deciliter  heparin   Injectable 6500 Unit(s) IV Push every 6 hours PRN For aPTT less than 40  heparin   Injectable 3000 Unit(s) IV Push every 6 hours PRN For aPTT between 40 - 57  melatonin 3 milliGRAM(s) Oral at bedtime PRN Insomnia  sodium chloride 0.9% lock flush 10 milliLiter(s) IV Push every 1 hour PRN Pre/post blood products, medications, blood draw, and to maintain line patency      Allergies    No Known Allergies    Intolerances        LABS:                        11.4   9.50  )-----------( 255      ( 04 Sep 2022 07:19 )             38.5     09-04    147<H>  |  104  |  71<H>  ----------------------------<  115<H>  3.6   |  25  |  1.35<H>    Ca    9.8      04 Sep 2022 07:19  Phos  1.6     09-04  Mg     1.9     09-04    TPro  8.2  /  Alb  3.9  /  TBili  1.4<H>  /  DBili  x   /  AST  22  /  ALT  19  /  AlkPhos  163<H>  09-04    PTT - ( 04 Sep 2022 15:09 )  PTT:70.5 sec      RADIOLOGY & ADDITIONAL TESTS:  Studies reviewed.    ASSESSMENT & PLAN:

## 2022-09-04 NOTE — PROGRESS NOTE ADULT - ATTENDING COMMENTS
72M hx DLBCL (tx with R-CHOP in 2003, with relapse in 2009 treated with BR) now with multiple areas of palpable lymphadenopathy with biopsies shown to be at least grade IIIA follicular lymphoma. Has had significant weight loss >20lbs in the last 6 months. Also noted to have an IgG lambda, IgG Kappa, and IgM Lambda monoclonal gammopathies. Prior to being sent to the ED his labs were suggestive of TLS with UA 13.7 s/p 3mg rasburicase on 8/23.      #Follicular Lymphoma  #Hemolytic Anemia  -Follows with Dr. Cordova at [facility].   -Originally diagnosed in 2003 s/p RCHOP with relapse in 2009 s/p BR. Recent outpatient PET/CT 8/2022 showed concern for POD with new LAD and subcutaneous tissue nodules  ---s/p FNA L cervical LN in June 2022 with path c/w grade 3A follicular lymphoma positive for BCL6 (3q27) breakpoint translocation and negative for MYC Rearrangement or BCL2-IGH gene rearrangement [translocation t(14;18) present in ~ 85% of [place]].   ---Planned for excisional biopsy outpatient to confirm suspected 3B FL vs. transformation, but unable to be completed due to current admission. Patient presented with spontaneous TLS, now requiring inpatient treatment. Ideally, we would require an excisional biopsy to confirm the suspected diagnosis. However, patient is not optimized medically for an excisional biopsy inpatient, so we will treat for a presumptive diagnosis of relapsed FL with regimen below so as not to delay treatment:  -Patient is on treatment with a modified regimen of mini-COEP plus Obinutuzumab. Started on 9/1/22. Today is D3:  ------Cyclophosphamide 400 mg/m² on D1 (9/1)  ------Etoposide 40% dose reduced to 30 mg/m2 IV on D1-D3 of each cycle (9/1-9/3)  ------Vincristine 2mg (flat dose) on D1 (9/1)  ------Prednisone 100mg PO daily x5 days (started on 8/29-9/2)  ------Obinutuzumab 100mg test dose on D1 + 900mg full dose on D2 (9/1, (9/2)  -Excisional biopsy should be completed as soon as possible once patient is medically cleared, but as above, we will still plan to proceed with treatment.    #G6PD Deficiency  #Concern for TLS  -Patient is G6PD Deficient (G6PD 4.8) and is very high risk for TLS in the s/o spontaneous TLS on admission. He may require further doses of Rasburicase which can cause hemolytic anemia in the setting of G6PD Deficiency  -He is s/p rasburicase on 8/23 outpatient with development of hemolytic anemia and is now s/p 4u pRBC on this admission--repeat G6PD on 8/27 following transfusions now 11.1  -As we are initiating treatment today, we are anticipating patient may develop TLS and we may have to initiate HD as we cannot safely administer rasburicase  -Please check TLS labs BID. Please ensure to check CMP, Mg, Phos, LDH, Uric acid.  ---If Uric Acid is >10, please give 3mg Rasburicase. If Rasburicase is given, please check CBC BID as patient is high risk for hemolysis. Please contact the Hematology fellow on call prior to administration of Rasburicase. Please call the Hematology fellow on call if there are any concerning findings on the TLS labs.  -Nephrology consulted. Appreciate recommendations

## 2022-09-04 NOTE — PROGRESS NOTE ADULT - ASSESSMENT
72M hx DLBCL (tx with R-CHOP in 2003, with relapse in 2009 treated with BR) now with multiple areas of palpable lymphadenopathy with biopsies shown to be at least grade IIIA follicular lymphoma. Has had significant weight loss >20lbs in the last 6 months. Also noted to have an IgG lambda, IgG Kappa, and IgM Lambda monoclonal gammopathies. Prior to being sent to the ED his labs were suggestive of TLS with UA 13.7 s/p 3mg rasburicase on 8/23.      #Follicular Lymphoma  #Hemolytic Anemia  -Follows with Dr. Cordova at [facility].   -Originally diagnosed in 2003 s/p RCHOP with relapse in 2009 s/p BR. Recent outpatient PET/CT 8/2022 showed concern for POD with new LAD and subcutaneous tissue nodules  ---s/p FNA L cervical LN in June 2022 with path c/w grade 3A follicular lymphoma positive for BCL6 (3q27) breakpoint translocation and negative for MYC Rearrangement or BCL2-IGH gene rearrangement [translocation t(14;18) present in ~ 85% of FL].   ---Planned for excisional biopsy outpatient to confirm suspected 3B FL vs. transformation, but unable to be completed due to current admission. Patient presented with spontaneous TLS, now requiring inpatient treatment. Ideally, we would require an excisional biopsy to confirm the suspected diagnosis. However, patient is not optimized medically for an excisional biopsy inpatient, so we will treat for a presumptive diagnosis of relapsed FL with regimen below so as not to delay treatment:  -Patient is on treatment with a modified regimen of mini-COEP plus Obinutuzumab. Started on 9/1/22. Today is D3:  ------Cyclophosphamide 400 mg/m² on D1 (9/1)  ------Etoposide 40% dose reduced to 30 mg/m2 IV on D1-D3 of each cycle (9/1-9/3)  ------Vincristine 2mg (flat dose) on D1 (9/1)  ------Prednisone 100mg PO daily x5 days (started on 8/29-9/2)  ------Obinutuzumab 100mg test dose on D1 + 900mg full dose on D2 (9/1, (9/2)  -Excisional biopsy should be completed as soon as possible once patient is medically cleared, but as above, we will still plan to proceed with treatment.    #G6PD Deficiency  #Concern for TLS  -Patient is G6PD Deficient (G6PD 4.8) and is very high risk for TLS in the s/o spontaneous TLS on admission. He may require further doses of Rasburicase which can cause hemolytic anemia in the setting of G6PD Deficiency  -He is s/p rasburicase on 8/23 outpatient with development of hemolytic anemia and is now s/p 4u pRBC on this admission--repeat G6PD on 8/27 following transfusions now 11.1  -As we are initiating treatment today, we are anticipating patient may develop TLS and we may have to initiate HD as we cannot safely administer rasburicase  -Please check TLS labs BID. Please ensure to check CMP, Mg, Phos, LDH, Uric acid.  ---If Uric Acid is >10, please give 3mg Rasburicase. If Rasburicase is given, please check CBC BID as patient is high risk for hemolysis. Please contact the Hematology fellow on call prior to administration of Rasburicase. Please call the Hematology fellow on call if there are any concerning findings on the TLS labs.  -Nephrology consulted. Appreciate recommendations

## 2022-09-05 DIAGNOSIS — Z29.9 ENCOUNTER FOR PROPHYLACTIC MEASURES, UNSPECIFIED: ICD-10-CM

## 2022-09-05 LAB
ALBUMIN SERPL ELPH-MCNC: 3.3 G/DL — SIGNIFICANT CHANGE UP (ref 3.3–5)
ALBUMIN SERPL ELPH-MCNC: 3.7 G/DL — SIGNIFICANT CHANGE UP (ref 3.3–5)
ALP SERPL-CCNC: 127 U/L — HIGH (ref 40–120)
ALP SERPL-CCNC: 140 U/L — HIGH (ref 40–120)
ALT FLD-CCNC: 14 U/L — SIGNIFICANT CHANGE UP (ref 10–45)
ALT FLD-CCNC: 33 U/L — SIGNIFICANT CHANGE UP (ref 10–45)
ANION GAP SERPL CALC-SCNC: 11 MMOL/L — SIGNIFICANT CHANGE UP (ref 5–17)
ANION GAP SERPL CALC-SCNC: 12 MMOL/L — SIGNIFICANT CHANGE UP (ref 5–17)
APTT BLD: 76.8 SEC — HIGH (ref 27.5–35.5)
AST SERPL-CCNC: 16 U/L — SIGNIFICANT CHANGE UP (ref 10–40)
AST SERPL-CCNC: 39 U/L — SIGNIFICANT CHANGE UP (ref 10–40)
BILIRUB SERPL-MCNC: 1 MG/DL — SIGNIFICANT CHANGE UP (ref 0.2–1.2)
BILIRUB SERPL-MCNC: 1.1 MG/DL — SIGNIFICANT CHANGE UP (ref 0.2–1.2)
BUN SERPL-MCNC: 57 MG/DL — HIGH (ref 7–23)
BUN SERPL-MCNC: 58 MG/DL — HIGH (ref 7–23)
CALCIUM SERPL-MCNC: 9 MG/DL — SIGNIFICANT CHANGE UP (ref 8.4–10.5)
CALCIUM SERPL-MCNC: 9.1 MG/DL — SIGNIFICANT CHANGE UP (ref 8.4–10.5)
CHLORIDE SERPL-SCNC: 103 MMOL/L — SIGNIFICANT CHANGE UP (ref 96–108)
CHLORIDE SERPL-SCNC: 105 MMOL/L — SIGNIFICANT CHANGE UP (ref 96–108)
CO2 SERPL-SCNC: 26 MMOL/L — SIGNIFICANT CHANGE UP (ref 22–31)
CO2 SERPL-SCNC: 29 MMOL/L — SIGNIFICANT CHANGE UP (ref 22–31)
CREAT SERPL-MCNC: 1.02 MG/DL — SIGNIFICANT CHANGE UP (ref 0.5–1.3)
CREAT SERPL-MCNC: 1.13 MG/DL — SIGNIFICANT CHANGE UP (ref 0.5–1.3)
EGFR: 69 ML/MIN/1.73M2 — SIGNIFICANT CHANGE UP
EGFR: 79 ML/MIN/1.73M2 — SIGNIFICANT CHANGE UP
GLUCOSE BLDC GLUCOMTR-MCNC: 128 MG/DL — HIGH (ref 70–99)
GLUCOSE BLDC GLUCOMTR-MCNC: 135 MG/DL — HIGH (ref 70–99)
GLUCOSE BLDC GLUCOMTR-MCNC: 180 MG/DL — HIGH (ref 70–99)
GLUCOSE BLDC GLUCOMTR-MCNC: 180 MG/DL — HIGH (ref 70–99)
GLUCOSE SERPL-MCNC: 123 MG/DL — HIGH (ref 70–99)
GLUCOSE SERPL-MCNC: 171 MG/DL — HIGH (ref 70–99)
HAPTOGLOB SERPL-MCNC: 139 MG/DL — SIGNIFICANT CHANGE UP (ref 34–200)
HAPTOGLOB SERPL-MCNC: 145 MG/DL — SIGNIFICANT CHANGE UP (ref 34–200)
HCT VFR BLD CALC: 38.1 % — LOW (ref 39–50)
HGB BLD-MCNC: 11.5 G/DL — LOW (ref 13–17)
LDH SERPL L TO P-CCNC: 291 U/L — HIGH (ref 50–242)
LDH SERPL L TO P-CCNC: 294 U/L — HIGH (ref 50–242)
MAGNESIUM SERPL-MCNC: 1.8 MG/DL — SIGNIFICANT CHANGE UP (ref 1.6–2.6)
MAGNESIUM SERPL-MCNC: 1.9 MG/DL — SIGNIFICANT CHANGE UP (ref 1.6–2.6)
MCHC RBC-ENTMCNC: 26.4 PG — LOW (ref 27–34)
MCHC RBC-ENTMCNC: 30.2 GM/DL — LOW (ref 32–36)
MCV RBC AUTO: 87.6 FL — SIGNIFICANT CHANGE UP (ref 80–100)
NRBC # BLD: 0 /100 WBCS — SIGNIFICANT CHANGE UP (ref 0–0)
PHOSPHATE SERPL-MCNC: 2.7 MG/DL — SIGNIFICANT CHANGE UP (ref 2.5–4.5)
PHOSPHATE SERPL-MCNC: 2.9 MG/DL — SIGNIFICANT CHANGE UP (ref 2.5–4.5)
PLATELET # BLD AUTO: 193 K/UL — SIGNIFICANT CHANGE UP (ref 150–400)
POTASSIUM SERPL-MCNC: 3.1 MMOL/L — LOW (ref 3.5–5.3)
POTASSIUM SERPL-MCNC: 4.2 MMOL/L — SIGNIFICANT CHANGE UP (ref 3.5–5.3)
POTASSIUM SERPL-SCNC: 3.1 MMOL/L — LOW (ref 3.5–5.3)
POTASSIUM SERPL-SCNC: 4.2 MMOL/L — SIGNIFICANT CHANGE UP (ref 3.5–5.3)
PROT SERPL-MCNC: 7.2 G/DL — SIGNIFICANT CHANGE UP (ref 6–8.3)
PROT SERPL-MCNC: 7.4 G/DL — SIGNIFICANT CHANGE UP (ref 6–8.3)
RBC # BLD: 4.35 M/UL — SIGNIFICANT CHANGE UP (ref 4.2–5.8)
RBC # FLD: 19.8 % — HIGH (ref 10.3–14.5)
SODIUM SERPL-SCNC: 141 MMOL/L — SIGNIFICANT CHANGE UP (ref 135–145)
SODIUM SERPL-SCNC: 145 MMOL/L — SIGNIFICANT CHANGE UP (ref 135–145)
URATE SERPL-MCNC: 8.6 MG/DL — SIGNIFICANT CHANGE UP (ref 3.4–8.8)
URATE SERPL-MCNC: 9.2 MG/DL — HIGH (ref 3.4–8.8)
WBC # BLD: 9.23 K/UL — SIGNIFICANT CHANGE UP (ref 3.8–10.5)
WBC # FLD AUTO: 9.23 K/UL — SIGNIFICANT CHANGE UP (ref 3.8–10.5)

## 2022-09-05 PROCEDURE — 99232 SBSQ HOSP IP/OBS MODERATE 35: CPT | Mod: GC

## 2022-09-05 RX ORDER — POTASSIUM CHLORIDE 20 MEQ
40 PACKET (EA) ORAL EVERY 4 HOURS
Refills: 0 | Status: COMPLETED | OUTPATIENT
Start: 2022-09-05 | End: 2022-09-05

## 2022-09-05 RX ORDER — SACUBITRIL AND VALSARTAN 24; 26 MG/1; MG/1
1 TABLET, FILM COATED ORAL EVERY 12 HOURS
Refills: 0 | Status: DISCONTINUED | OUTPATIENT
Start: 2022-09-05 | End: 2022-09-07

## 2022-09-05 RX ADMIN — SACUBITRIL AND VALSARTAN 1 TABLET(S): 24; 26 TABLET, FILM COATED ORAL at 17:14

## 2022-09-05 RX ADMIN — CHLORHEXIDINE GLUCONATE 1 APPLICATION(S): 213 SOLUTION TOPICAL at 12:04

## 2022-09-05 RX ADMIN — ATORVASTATIN CALCIUM 40 MILLIGRAM(S): 80 TABLET, FILM COATED ORAL at 21:24

## 2022-09-05 RX ADMIN — Medication 3 MILLIGRAM(S): at 21:24

## 2022-09-05 RX ADMIN — HEPARIN SODIUM 1600 UNIT(S)/HR: 5000 INJECTION INTRAVENOUS; SUBCUTANEOUS at 12:03

## 2022-09-05 RX ADMIN — Medication 40 MILLIEQUIVALENT(S): at 08:34

## 2022-09-05 RX ADMIN — CHLORHEXIDINE GLUCONATE 1 APPLICATION(S): 213 SOLUTION TOPICAL at 05:20

## 2022-09-05 RX ADMIN — Medication 40 MILLIEQUIVALENT(S): at 10:01

## 2022-09-05 RX ADMIN — Medication 1: at 12:03

## 2022-09-05 RX ADMIN — SACUBITRIL AND VALSARTAN 1 TABLET(S): 24; 26 TABLET, FILM COATED ORAL at 05:14

## 2022-09-05 RX ADMIN — Medication 1 TABLET(S): at 12:04

## 2022-09-05 RX ADMIN — HEPARIN SODIUM 1600 UNIT(S)/HR: 5000 INJECTION INTRAVENOUS; SUBCUTANEOUS at 20:11

## 2022-09-05 RX ADMIN — Medication 1: at 21:14

## 2022-09-05 RX ADMIN — Medication 25 MILLIGRAM(S): at 05:14

## 2022-09-05 RX ADMIN — Medication 400 MILLIGRAM(S): at 17:14

## 2022-09-05 RX ADMIN — Medication 400 MILLIGRAM(S): at 05:14

## 2022-09-05 RX ADMIN — Medication 100 MILLIGRAM(S): at 12:04

## 2022-09-05 RX ADMIN — HEPARIN SODIUM 1600 UNIT(S)/HR: 5000 INJECTION INTRAVENOUS; SUBCUTANEOUS at 06:39

## 2022-09-05 NOTE — OCCUPATIONAL THERAPY INITIAL EVALUATION ADULT - COGNITIVE, VISUAL PERCEPTUAL, OT EVAL
Pt will be oriented x3-4 100% of the time within 4 weeks. pt will follow 100% simple and complex commands within 4 weeks. Pt will recall 3/3 words after 5 min within 4 weeks.

## 2022-09-05 NOTE — PROGRESS NOTE ADULT - ASSESSMENT
72M hx DLBCL (tx with R-CHOP in 2003, with relapse in 2009 treated with BR) now with multiple areas of palpable lymphadenopathy with biopsies shown to be at least grade IIIA follicular lymphoma. Has had significant weight loss >20lbs in the last 6 months. Also noted to have an IgG lambda, IgG Kappa, and IgM Lambda monoclonal gammopathies. Prior to being sent to the ED his labs were suggestive of TLS with UA 13.7 s/p 3mg rasburicase on 8/23.      #Follicular Lymphoma  #Hemolytic Anemia  -Follows with Dr. Cordova at [facility].   -Originally diagnosed in 2003 s/p RCHOP with relapse in 2009 s/p BR. Recent outpatient PET/CT 8/2022 showed concern for POD with new LAD and subcutaneous tissue nodules  ---s/p FNA L cervical LN in June 2022 with path c/w grade 3A follicular lymphoma positive for BCL6 (3q27) breakpoint translocation and negative for MYC Rearrangement or BCL2-IGH gene rearrangement [translocation t(14;18) present in ~ 85% of FL].   ---Planned for excisional biopsy outpatient to confirm suspected 3B FL vs. transformation, but unable to be completed due to current admission. Patient presented with spontaneous TLS, now requiring inpatient treatment. Ideally, we would require an excisional biopsy to confirm the suspected diagnosis. However, patient is not optimized medically for an excisional biopsy inpatient, so we will treat for a presumptive diagnosis of relapsed FL with regimen below so as not to delay treatment:  -Patient is on treatment with a modified regimen of mini-COEP plus Obinutuzumab. Started on 9/1/22.   ------Cyclophosphamide 400 mg/m² on D1 (9/1)  ------Etoposide 40% dose reduced to 30 mg/m2 IV on D1-D3 of each cycle (9/1-9/3)  ------Vincristine 2mg (flat dose) on D1 (9/1)  ------Prednisone 100mg PO daily x5 days (started on 8/29-9/2)  ------Obinutuzumab 100mg test dose on D1 + 900mg full dose on D2 (9/1, (9/2)  -Excisional biopsy should be completed as soon as possible once patient is medically cleared, but as above, we will still plan to proceed with treatment.    #G6PD Deficiency  #Concern for TLS  -Patient is G6PD Deficient (G6PD 4.8) and is very high risk for TLS in the s/o spontaneous TLS on admission. He may require further doses of Rasburicase which can cause hemolytic anemia in the setting of G6PD Deficiency  -He is s/p rasburicase on 8/23 outpatient with development of hemolytic anemia and is now s/p 4u pRBC on this admission--repeat G6PD on 8/27 following transfusions now 11.1  -As we are initiating treatment today, we are anticipating patient may develop TLS and we may have to initiate HD as we cannot safely administer rasburicase  -Please check TLS labs BID. Please ensure to check CMP, Mg, Phos, LDH, Uric acid.  ---If Uric Acid is >10, please give 3mg Rasburicase. If Rasburicase is given, please check CBC BID as patient is high risk for hemolysis. Please contact the Hematology fellow on call prior to administration of Rasburicase. Please call the Hematology fellow on call if there are any concerning findings on the TLS labs.  -Nephrology consulted. Appreciate recommendations

## 2022-09-05 NOTE — PROGRESS NOTE ADULT - SUBJECTIVE AND OBJECTIVE BOX
Neurology Progress Note    S: Patient seen and examined. No new events overnight. more confused today     Medication:  \MEDICATIONS  (STANDING):  acyclovir   Oral Tab/Cap 400 milliGRAM(s) Oral two times a day  allopurinol 100 milliGRAM(s) Oral daily  atorvastatin 40 milliGRAM(s) Oral at bedtime  chlorhexidine 2% Cloths 1 Application(s) Topical daily  chlorhexidine 4% Liquid 1 Application(s) Topical <User Schedule>  dextrose 5%. 1000 milliLiter(s) (100 mL/Hr) IV Continuous <Continuous>  dextrose 5%. 1000 milliLiter(s) (50 mL/Hr) IV Continuous <Continuous>  dextrose 50% Injectable 25 Gram(s) IV Push once  dextrose 50% Injectable 12.5 Gram(s) IV Push once  dextrose 50% Injectable 25 Gram(s) IV Push once  glucagon  Injectable 1 milliGRAM(s) IntraMuscular once  heparin  Infusion.  Unit(s)/Hr (14 mL/Hr) IV Continuous <Continuous>  insulin lispro (ADMELOG) corrective regimen sliding scale   SubCutaneous Before meals and at bedtime  metoprolol succinate ER 25 milliGRAM(s) Oral daily  multivitamin 1 Tablet(s) Oral daily  potassium chloride    Tablet ER 40 milliEquivalent(s) Oral every 4 hours  sacubitril 49 mG/valsartan 51 mG 1 Tablet(s) Oral every 12 hours    MEDICATIONS  (PRN):  dextrose Oral Gel 15 Gram(s) Oral once PRN Blood Glucose LESS THAN 70 milliGRAM(s)/deciliter  heparin   Injectable 6500 Unit(s) IV Push every 6 hours PRN For aPTT less than 40  heparin   Injectable 3000 Unit(s) IV Push every 6 hours PRN For aPTT between 40 - 57  melatonin 3 milliGRAM(s) Oral at bedtime PRN Insomnia  sodium chloride 0.9% lock flush 10 milliLiter(s) IV Push every 1 hour PRN Pre/post blood products, medications, blood draw, and to maintain line patency      Vitals:  Vital Signs Last 24 Hrs  T(C): 36.5 (22 @ 04:00), Max: 36.6 (22 @ 20:49)  T(F): 97.7 (22 @ 04:00), Max: 97.8 (22 @ 20:49)  HR: 92 (22 @ 04:00) (79 - 102)  BP: 144/81 (22 @ 04:00) (106/71 - 144/81)  BP(mean): --  RR: 16 (22 @ 04:00) (16 - 18)  SpO2: 99% (22 @ 04:00) (96% - 99%)          General Exam:   General Appearance: Appropriately dressed and in no acute distress       Head: Normocephalic, atraumatic and no dysmorphic features  Ear, Nose, and Throat: Moist mucous membranes  CVS: S1S2+  Resp: No SOB, no wheeze or rhonchi  Abd: soft NTND  Extremities: No edema, no cyanosis  Skin: No bruises, no rashes     Neurological Exam:  - Mental Status:  Alert, awake, oriented to person, not place, and time; Speech is fluent with intact naming.   - Cranial Nerves II-XII:    II:  Visual fields are full to confrontation; Pupils are equal, round, and reactive to light  III, IV, VI:  Extraocular movements are intact without nystagmus.  V:  Facial sensation is intact in the V1-V3 distribution bilaterally.  VII:  Face is symmetric with normal eye closure and smile  VIII:  Hearing is intact to finger rub.  IX, X:  Uvula is midline and soft palate rises symmetrically  XI:  Head turning and shoulder shrug are intact.  XII:  Tongue protudes in the midline.  - Motor:  Strength is 5/5 throughout.  There is no pronator drift.  Normal muscle bulk and tone throughout.  - Sensory:  Intact throughout to light touch.  - Coordination:  Finger-nose-finger and heel-knee-shin intact without dysmetria, but b/l intention tremor.   - Gait:   Due to fall risk, did not assess.       I personally reviewed the below data/images/labs:          CBC Full  -  ( 05 Sep 2022 06:06 )  WBC Count : 9.23 K/uL  RBC Count : 4.35 M/uL  Hemoglobin : 11.5 g/dL  Hematocrit : 38.1 %  Platelet Count - Automated : 193 K/uL  Mean Cell Volume : 87.6 fl  Mean Cell Hemoglobin : 26.4 pg  Mean Cell Hemoglobin Concentration : 30.2 gm/dL  Auto Neutrophil # : x  Auto Lymphocyte # : x  Auto Monocyte # : x  Auto Eosinophil # : x  Auto Basophil # : x  Auto Neutrophil % : x  Auto Lymphocyte % : x  Auto Monocyte % : x  Auto Eosinophil % : x  Auto Basophil % : x          145  |  105  |  58<H>  ----------------------------<  123<H>  3.1<L>   |  29  |  1.13    Ca    9.1      05 Sep 2022 06:06  Phos  2.9       Mg     1.9         TPro  7.4  /  Alb  3.7  /  TBili  1.1  /  DBili  x   /  AST  16  /  ALT  14  /  AlkPhos  127<H>          Urinalysis Basic - ( 01 Sep 2022 12:01 )    Color: Light Yellow / Appearance: Clear / S.011 / pH: x  Gluc: x / Ketone: Negative  / Bili: Negative / Urobili: Negative   Blood: x / Protein: Negative / Nitrite: Negative   Leuk Esterase: Negative / RBC: x / WBC x   Sq Epi: x / Non Sq Epi: x / Bacteria: x    Acute/subacute right-sided parietal lobe infarction   superimposed upon a chronic infarct. Findings appear larger in size when   compared with 2019.    No hemorrhagic transformation at this time.    Similar-appearing mild chronic white matter microvascular type changes   and chronic lacunar infarct within the right basal ganglia.

## 2022-09-05 NOTE — OCCUPATIONAL THERAPY INITIAL EVALUATION ADULT - PERTINENT HX OF CURRENT PROBLEM, REHAB EVAL
72 year old male with significant history of HTN, CKD stage II, complete heart block (PPM in place), HLD, HFrEF (45% in 2019), and DLBCL (tx with R-CHOP in 2003, with relapse in 2009 treated with BR) now with recently diagnosed grade 3 follicular lymphoma on 8/23 who presents with rasburicase associated G6PD hemolytic anemia in setting of TLS on 8/23 and AHRF. CT Abd/Pelvis 8/26: Probable enteritis. Small volume ascites. Redemonstrated additional findings consistent with history of lymphoma without significant change when compared to PET/CT 8/6/2022. X-Ray Chest 8/26: Similar right sided opacities and loculated small right pleural effusion. CT Head 8/31: Acute/subacute right-sided parietal lobe infarction superimposed upon a chronic infarct. Findings appear larger in size when compared with 5/7/2019. No hemorrhagic transformation at this time. Similar-appearing mild chronic white matter microvascular type changes and chronic lacunar infarct within the right basal ganglia.
72 year old male with significant history of HTN, CKD stage II, complete heart block (PPM in place), HLD, HFrEF (45% in 2019), and DLBCL (tx with R-CHOP in 2003, with relapse in 2009 treated with BR) now with recently diagnosed grade 3 follicular lymphoma on 8/23 who presents with rasburicase associated G6PD hemolytic anemia in setting of TLS on 8/23 and AHRF. CT Abd/Pelvis 8/26: Probable enteritis. Small volume ascites. Re-demonstrated additional findings consistent with history of lymphoma without significant change when compared to PET/CT 8/6/2022. X-Ray Chest 8/26: Similar right sided opacities and loculated small right pleural effusion. CT Head 8/31: Acute/subacute right-sided parietal lobe infarction superimposed upon a chronic infarct. Findings appear larger in size when compared with 5/7/2019. No hemorrhagic transformation at this time. Similar-appearing mild chronic white matter microvascular type changes and chronic lacunar infarct within the right basal ganglia.

## 2022-09-05 NOTE — PROGRESS NOTE ADULT - ATTENDING COMMENTS
73yo M w/ extensive PMHx including DLBCL, G6PD deficiency, complete heart block s/p PPM, HFrEF (new EF 20%), CKD 3b, paroxysmal atrial fibrillation, presents with anemia, pt was treated for TLS syndrome w/ rasburicase on 8/23 and current episode concerning for an acute hemolytic anemia in setting of G6PD deficiency. Patient also p/w acute hypoxic respiratory failure due to heart failure exacerbation requiring bipap initially. He was eventually titrated off bipap and nasal cannula, now breathing comfortably on room air after initiation of diuretics.    #Hemolytic anemia due to G6PD deficiency and rasburicase  -s/p transfusion, counts stable, continue to trend CBC  -c/w A/C (eliquis switched to hep gtt in anticipation of bx)  -heme following - if uric acid >10 will need another dose of rasburicase, will trend TLS labs and CBC closely    #Follicular lymphoma  #TLS  -consulted renal for TLS  -completed prednisone and inpatient lymphoma treatment per heme   -cont allopurinol   -biopsy will be done w/local anesthesia Wednesday    #acute on chronic systolic heart failure  -heart failure recs appreciated, consulting cardio-oncology now too  -per neuro OK to aim for normotension - titrating up entresto.   -switched coreg to metoprolol due to soft BPs  -off bumex due to hypernatremia  -echo shows EF of 20%    Metabolic encephalopathy  - patient has been oriented to self and sometimes place for the past few days, but no acute changes in mentation  - CT head incidentally found to have acute/subacute R parietal stroke - neurology recs appreciated. Obtain MRI/MRA H/N (scheduled for Tues)  - elevated wbc count is likely due to steroid initiation. Cultures ngtd

## 2022-09-05 NOTE — PROGRESS NOTE ADULT - PROBLEM SELECTOR PLAN 2
heme/onc      - s/p 1 session chemotherapy: TLS labs significant for uric acid 8.2, phosphorus 5.6      - s/p 2 sessions chemotherapy: TLS labs significant for uric acid 8.3, phosphorus 5.4 s/p sevelamer       - s/p 3 sessions chemotherapy: TLS labs significant for uric acid 9.1, phosphorus 2.9      - most recent G6PD level 11.1 (wnl) following multiple transfusions of pRBC      - patient is at high risk for TLS but still requires chemotherapy iso lymphoma, comfortable to give rasburicase if needed with improved G6PD levels --> as per heme/onc if uric acid > 10, contact fellow first prior to 3mg rasburicase      - 9/3 AM with uric acid 8.9, phos 4.4, potassium wnl      - 9/4 AM with uric acid 9.4, phos 1.6, will d/c sevelamer   - on allopurinol 100mg  - uric acid still under threshold set by heme/onc will c/w monitor for now, if uptrending will reach out for further recs, no more scheduled sessions of chemotherapy - h/o grade 3 follicular lymphoma, pt was pending surgical biopsy on 8/23, however in heme/onc outpt labs s/f TLS with uric acid 13.7, pt was started rasburicase on 8/23 and developed rasburicase-triggered G6PD hemolysis with hgb 5.7 on 8/26  - UA 6.4 from 13.7 s/p rasburicase on 8/23, now on allopurinol 100mg   - follow-up with heme:      - Started prednisone 100mg daily x5 (day 4/5) days with intent to start Obinutuzumab-COEP inpatient 9/1      - PICC placed (8/30) however patient removed it, PICC placed again (8/31) and ready for chemotherapy initiation      - trend TLS labs (CMP, Mg, Phos, LDH, Uric Acid) q12hrs as patient is high risk at 6am and 6pm daily      - s/p 1st session 9/1, pending 2nd session 9/2  - Surgery consulted for biopsy for definitive diagnosis      - tentatively planned for Wednesday under local anesthesia, neuro ok with biopsy

## 2022-09-05 NOTE — OCCUPATIONAL THERAPY INITIAL EVALUATION ADULT - NSOTDISCHREC_GEN_A_CORE
Outpatient OT for cognitive rehab, will require supervision at home for ALL functional tasks due to cognitive deficits/Outpatient OT

## 2022-09-05 NOTE — PROGRESS NOTE ADULT - PROBLEM SELECTOR PLAN 5
- h/o grade 3 follicular lymphoma, pt was pending surgical biopsy on 8/23, however in heme/onc outpt labs s/f TLS with uric acid 13.7, pt was started rasburicase on 8/23 and developed rasburicase-triggered G6PD hemolysis with hgb 5.7 on 8/26  - UA 6.4 from 13.7 s/p rasburicase on 8/23, now on allopurinol 100mg   - follow-up with heme:      - Started prednisone 100mg daily x5 (day 4/5) days with intent to start Obinutuzumab-COEP inpatient 9/1      - PICC placed (8/30) however patient removed it, PICC placed again (8/31) and ready for chemotherapy initiation      - trend TLS labs (CMP, Mg, Phos, LDH, Uric Acid) q12hrs as patient is high risk at 6am and 6pm daily      - s/p 1st session 9/1, pending 2nd session 9/2  - Surgery consulted for biopsy for definitive diagnosis      - tentatively planned for Wednesday, neuro ok with biopsy  - cardiology consult      - c/w metoprolol, entresto      - stop diuresis, hydralazine   - cardiology oncology consult      - TTE with severe global left ventricular systolic dysfunction (EF 30%); severe mitral regurg, elevated pulmonary pressures      - added entresto      - SGLT2 is possible Na at 151->149->147, uptrend from prior 140s, could be related to volume depletion iso diuresis vs DI iso recent neuro event  - serum osm yesterday was 333, urine osm 399  - hyperosmolar hypernatremia likely iso diuretic use, improvement today with downtrending Na, c/w monitor  - resolved

## 2022-09-05 NOTE — PROGRESS NOTE ADULT - PROBLEM SELECTOR PLAN 4
Has been confused since admission with improvement today (9/1). As per family, this occurs every time the patient is sick with similar symptoms. Considerations include underlying dementia, delirium, vs metabolic causes vs neurological vs malignancy  - AAOx1  - cultures with no growth to date, no acute toxicologies found on labs, no history of cirrhosis with low suspicion for ammonia induced, no indication for renal failure for uremic causes  - CT head non-contrast performed, no official read but as per radiology has acute on chronic of R parietal lobe infarct with no indication of hemorrhagic transformation  - neuro consult       - MRI brain wo contrast, MRA H wo contrast, MRA N w contrast -> tentatively scheduled for Tuesday       - asa, atorvastatin 80mg      - TTE with severe global left ventricular systolic dysfunction, no evidence of thrombi presenting with G6PD hemolysis 2/2 rasburicase (low hapto, high LDH/alk/bili) s/p rasburicase for TLS on 8/23 w/hgb 5 on 8/26 (hgb 9 on 8/23)  - pt s/p total 4 units pRBC  - trend hgb q12h for now, transfuse to maintain hgb>7  - G6PD level stable following multiple transfusions   - will repeat if rasburicase given as above

## 2022-09-05 NOTE — PROGRESS NOTE ADULT - SUBJECTIVE AND OBJECTIVE BOX
PROGRESS NOTE:   Authored by Dave Vanegas MD   Patient is a 72y old  Male who presents with a chief complaint of follicular  B cell lymphoma relapse (04 Sep 2022 15:41)      SUBJECTIVE / OVERNIGHT EVENTS: No acute events overnight. No chest pain,  palpitations, dyspnea, abdominal pain. nausea, vomiting, diarrhea, blurry vision, dysuria, lightheadedness, dizziness.    ADDITIONAL REVIEW OF SYSTEMS:    MEDICATIONS  (STANDING):  acyclovir   Oral Tab/Cap 400 milliGRAM(s) Oral two times a day  allopurinol 100 milliGRAM(s) Oral daily  atorvastatin 40 milliGRAM(s) Oral at bedtime  chlorhexidine 2% Cloths 1 Application(s) Topical daily  chlorhexidine 4% Liquid 1 Application(s) Topical <User Schedule>  dextrose 5%. 1000 milliLiter(s) (50 mL/Hr) IV Continuous <Continuous>  dextrose 5%. 1000 milliLiter(s) (100 mL/Hr) IV Continuous <Continuous>  dextrose 50% Injectable 25 Gram(s) IV Push once  dextrose 50% Injectable 12.5 Gram(s) IV Push once  dextrose 50% Injectable 25 Gram(s) IV Push once  glucagon  Injectable 1 milliGRAM(s) IntraMuscular once  heparin  Infusion.  Unit(s)/Hr (14 mL/Hr) IV Continuous <Continuous>  insulin lispro (ADMELOG) corrective regimen sliding scale   SubCutaneous Before meals and at bedtime  metoprolol succinate ER 25 milliGRAM(s) Oral daily  multivitamin 1 Tablet(s) Oral daily  sacubitril 24 mG/valsartan 26 mG 1 Tablet(s) Oral two times a day    MEDICATIONS  (PRN):  dextrose Oral Gel 15 Gram(s) Oral once PRN Blood Glucose LESS THAN 70 milliGRAM(s)/deciliter  heparin   Injectable 6500 Unit(s) IV Push every 6 hours PRN For aPTT less than 40  heparin   Injectable 3000 Unit(s) IV Push every 6 hours PRN For aPTT between 40 - 57  melatonin 3 milliGRAM(s) Oral at bedtime PRN Insomnia  sodium chloride 0.9% lock flush 10 milliLiter(s) IV Push every 1 hour PRN Pre/post blood products, medications, blood draw, and to maintain line patency      CAPILLARY BLOOD GLUCOSE      POCT Blood Glucose.: 176 mg/dL (04 Sep 2022 21:08)  POCT Blood Glucose.: 149 mg/dL (04 Sep 2022 16:44)  POCT Blood Glucose.: 141 mg/dL (04 Sep 2022 11:50)  POCT Blood Glucose.: 125 mg/dL (04 Sep 2022 07:52)    I&O's Summary    04 Sep 2022 07:01  -  05 Sep 2022 07:00  --------------------------------------------------------  IN: 790 mL / OUT: 200 mL / NET: 590 mL        PHYSICAL EXAM:  Vital Signs Last 24 Hrs  T(C): 36.5 (05 Sep 2022 04:00), Max: 36.6 (04 Sep 2022 20:49)  T(F): 97.7 (05 Sep 2022 04:00), Max: 97.8 (04 Sep 2022 20:49)  HR: 92 (05 Sep 2022 04:00) (79 - 102)  BP: 144/81 (05 Sep 2022 04:00) (106/71 - 144/81)  BP(mean): --  RR: 16 (05 Sep 2022 04:00) (16 - 18)  SpO2: 99% (05 Sep 2022 04:00) (96% - 99%)    Parameters below as of 05 Sep 2022 04:00  Patient On (Oxygen Delivery Method): room air        GENERAL: No apparent distress.   HEAD:  Atraumatic, Normocephalic  EYES: EOMI, PERRLA, conjunctiva and sclera clear  NECK: Supple, no lymphadenopathy, no JVD  CHEST/LUNG: Clear to auscultation bilateral and symmetric; No wheezes, rales, or rhonchi  HEART: S1 and S2 normal. Regular rate and rhythm; No murmurs, rubs, or gallops  ABDOMEN: Soft, non-tender, non-distended; normal bowel sounds  EXTREMITIES:  2+ peripheral pulses b/l, No clubbing, cyanosis, or edema  NEUROLOGY: A&O x 3, no focal deficits  SKIN: No rashes or lesions    LABS:                        11.5   9.23  )-----------( 193      ( 05 Sep 2022 06:06 )             38.1     09-05    145  |  105  |  58<H>  ----------------------------<  123<H>  3.1<L>   |  29  |  1.13    Ca    9.1      05 Sep 2022 06:06  Phos  2.9     09-05  Mg     1.9     09-05    TPro  7.4  /  Alb  3.7  /  TBili  1.1  /  DBili  x   /  AST  16  /  ALT  14  /  AlkPhos  127<H>  09-05    PTT - ( 05 Sep 2022 06:06 )  PTT:76.8 sec            RADIOLOGY & ADDITIONAL TESTS:  Lab Results Reviewed   Imaging Reviewed  Electrocardiogram Reviewed   PROGRESS NOTE:   Authored by Dave Vanegas MD   Patient is a 72y old  Male who presents with a chief complaint of follicular  B cell lymphoma relapse (04 Sep 2022 15:41)      SUBJECTIVE / OVERNIGHT EVENTS: No acute events overnight. No chest pain,  palpitations, dyspnea, abdominal pain. nausea, vomiting, diarrhea, blurry vision, dysuria, lightheadedness, dizziness.    ADDITIONAL REVIEW OF SYSTEMS:    MEDICATIONS  (STANDING):  acyclovir   Oral Tab/Cap 400 milliGRAM(s) Oral two times a day  allopurinol 100 milliGRAM(s) Oral daily  atorvastatin 40 milliGRAM(s) Oral at bedtime  chlorhexidine 2% Cloths 1 Application(s) Topical daily  chlorhexidine 4% Liquid 1 Application(s) Topical <User Schedule>  dextrose 5%. 1000 milliLiter(s) (50 mL/Hr) IV Continuous <Continuous>  dextrose 5%. 1000 milliLiter(s) (100 mL/Hr) IV Continuous <Continuous>  dextrose 50% Injectable 25 Gram(s) IV Push once  dextrose 50% Injectable 12.5 Gram(s) IV Push once  dextrose 50% Injectable 25 Gram(s) IV Push once  glucagon  Injectable 1 milliGRAM(s) IntraMuscular once  heparin  Infusion.  Unit(s)/Hr (14 mL/Hr) IV Continuous <Continuous>  insulin lispro (ADMELOG) corrective regimen sliding scale   SubCutaneous Before meals and at bedtime  metoprolol succinate ER 25 milliGRAM(s) Oral daily  multivitamin 1 Tablet(s) Oral daily  sacubitril 24 mG/valsartan 26 mG 1 Tablet(s) Oral two times a day    MEDICATIONS  (PRN):  dextrose Oral Gel 15 Gram(s) Oral once PRN Blood Glucose LESS THAN 70 milliGRAM(s)/deciliter  heparin   Injectable 6500 Unit(s) IV Push every 6 hours PRN For aPTT less than 40  heparin   Injectable 3000 Unit(s) IV Push every 6 hours PRN For aPTT between 40 - 57  melatonin 3 milliGRAM(s) Oral at bedtime PRN Insomnia  sodium chloride 0.9% lock flush 10 milliLiter(s) IV Push every 1 hour PRN Pre/post blood products, medications, blood draw, and to maintain line patency      CAPILLARY BLOOD GLUCOSE      POCT Blood Glucose.: 176 mg/dL (04 Sep 2022 21:08)  POCT Blood Glucose.: 149 mg/dL (04 Sep 2022 16:44)  POCT Blood Glucose.: 141 mg/dL (04 Sep 2022 11:50)  POCT Blood Glucose.: 125 mg/dL (04 Sep 2022 07:52)    I&O's Summary    04 Sep 2022 07:01  -  05 Sep 2022 07:00  --------------------------------------------------------  IN: 790 mL / OUT: 200 mL / NET: 590 mL        PHYSICAL EXAM:  Vital Signs Last 24 Hrs  T(C): 36.5 (05 Sep 2022 04:00), Max: 36.6 (04 Sep 2022 20:49)  T(F): 97.7 (05 Sep 2022 04:00), Max: 97.8 (04 Sep 2022 20:49)  HR: 92 (05 Sep 2022 04:00) (79 - 102)  BP: 144/81 (05 Sep 2022 04:00) (106/71 - 144/81)  BP(mean): --  RR: 16 (05 Sep 2022 04:00) (16 - 18)  SpO2: 99% (05 Sep 2022 04:00) (96% - 99%)    Parameters below as of 05 Sep 2022 04:00  Patient On (Oxygen Delivery Method): room air        GENERAL: No apparent distress.   HEAD:  Atraumatic, Normocephalic  EYES: EOMI, PERRLA, conjunctiva and sclera clear  NECK: Supple, no lymphadenopathy, no JVD  CHEST/LUNG: Clear to auscultation bilateral and symmetric; No wheezes, rales, or rhonchi  HEART: S1 normal, loud S2. Regular rate and rhythm; No murmurs, rubs, or gallops  ABDOMEN: Soft, non-tender, non-distended; normal bowel sounds  EXTREMITIES:  2+ peripheral pulses b/l, No clubbing, cyanosis, or edema  NEUROLOGY: A&O x 1, no focal deficits  SKIN: No rashes or lesions. Mass upper back, midline.    LABS:                        11.5   9.23  )-----------( 193      ( 05 Sep 2022 06:06 )             38.1     09-05    145  |  105  |  58<H>  ----------------------------<  123<H>  3.1<L>   |  29  |  1.13    Ca    9.1      05 Sep 2022 06:06  Phos  2.9     09-05  Mg     1.9     09-05    TPro  7.4  /  Alb  3.7  /  TBili  1.1  /  DBili  x   /  AST  16  /  ALT  14  /  AlkPhos  127<H>  09-05    PTT - ( 05 Sep 2022 06:06 )  PTT:76.8 sec            RADIOLOGY & ADDITIONAL TESTS:  Lab Results Reviewed   Imaging Reviewed  Electrocardiogram Reviewed

## 2022-09-05 NOTE — PROGRESS NOTE ADULT - ASSESSMENT
72 year old male with HTN, CKD stage II, complete heart block (PPM in place), HLD, HFrEF (45% in 2019), and DLBCL (tx with R-CHOP in 2003, with relapse in 2009 treated with BR) now with recently diagnosed grade 3 follicular lymphoma on 8/23 who presents with anemia and SOB, patient was pending a surgical biopsy on 8/25. Admitted for TLS and rasburicase-triggered G6PD hemolytic anemia. Given that patient has developed confusion this admission and is now AOx1, primary team obtained a CTH, which shows acute/subacute R parietal infarction (larger in size when compared with 5/2019), chronic lacunar infarct in R basal ganglia, mild chronic white matter microvascular disease, no hemorrhagic transformation. Denies headache, weakness, numbness, dizziness, visual disturbances. NIHSS 2-did not know month and age. Of note, patient has had multiple prior episodes of confusion and cognitive difficulties.     CTH, which shows acute/subacute R parietal infarction, chronic R BG infarct noted   LDL 81   A1c 5.4     Impression:  R parietal infarction,  likely 2/2 AF     Recommendations:   - would be low risk for bx.  if heparin held, resume when able after procedure   - stroke is now at least subacute appearing.  can likely start titrating BP to normotension.  vessel imaging MRA H/N to help determine if any athero. if any severe stenosis may need to avoid hypotension   - heparin for AF.  no need for asa from stroke standpoint when on AC   - High dose statin therapy - atorvastatin 40mg PO daily. LDL goal <70mg/dL.  - MRI brain w/ and w/o , and MRA H/N pending   - TTE, no need from stroke standpoint   - telemetry  - PT/OT/SS/SLP, OOBC  - check FS, glucose control <180  - GI/DVT ppx  - Thank you for allowing me to participate in the care of this patient. Call with questions.  Stone Mo MD  Vascular Neurology  Office: [phone].

## 2022-09-05 NOTE — PROGRESS NOTE ADULT - PROBLEM SELECTOR PLAN 8
Presenting with respiratory distress possibly 2/2 acute hemolytic anemia and HF exacerbation vs. ACS r/o (pt with known chronic right sided loculated effusion). Started on BiPAP at 11 ipap/5 epap  - now off BiPAP on room air saturating well, OOB  - diuresis as above - afib w/ CHB s/p Micra pacemaker then s/p MDT CRT-P upgrade 9/30/19  - v paced on tele  - EP consulted for ICD interrogation 8/29/22 - wnl

## 2022-09-05 NOTE — PROGRESS NOTE ADULT - PROBLEM SELECTOR PLAN 3
presenting with G6PD hemolysis 2/2 rasburicase (low hapto, high LDH/alk/bili) s/p rasburicase for TLS on 8/23 w/hgb 5 on 8/26 (hgb 9 on 8/23)  - pt s/p total 4 units pRBC  - trend hgb q12h for now, transfuse to maintain hgb>7  - G6PD level stable following multiple transfusions   - will repeat if rasburicase given as above heme/onc      - s/p 1 session chemotherapy: TLS labs significant for uric acid 8.2, phosphorus 5.6      - s/p 2 sessions chemotherapy: TLS labs significant for uric acid 8.3, phosphorus 5.4 s/p sevelamer       - s/p 3 sessions chemotherapy: TLS labs significant for uric acid 9.1, phosphorus 2.9      - most recent G6PD level 11.1 (wnl) following multiple transfusions of pRBC      - patient is at high risk for TLS but still requires chemotherapy iso lymphoma, comfortable to give rasburicase if needed with improved G6PD levels --> as per heme/onc if uric acid > 10, contact fellow first prior to 3mg rasburicase      - 9/4 AM with uric acid 9.4, phos 1.6, will d/c sevelamer   - on allopurinol 100mg  - uric acid still under threshold set by heme/onc will c/w monitor for now, if uptrending will reach out for further recs, no more scheduled sessions of chemotherapy

## 2022-09-05 NOTE — PROGRESS NOTE ADULT - PROBLEM SELECTOR PLAN 6
History of HFrEF EF 45%, diffuse LV hypokinesis, mod pulmHTN; followed by cardiology outpatient, elevated proBNP with uptrending troponins, now downtrending. TTE (8/27) ef=20%, severe MR, mild LV enlargement, normal RA, RV enlargement with decreased RVSF, mild-mod tricuspid regurg, moderate pulm pressures, b/l pleural effusions   - c/w metoprolol tartrate 12.5mg BID (for discharge consider metoprolol succinate vs. resuming home Carvedilol at lower dose as per Cards)  - adding Entresto History of HFrEF EF 45%, diffuse LV hypokinesis, mod pulmHTN; followed by cardiology outpatient, elevated proBNP with uptrending troponins, now downtrending. TTE (8/27) ef=20%, severe MR, mild LV enlargement, normal RA, RV enlargement with decreased RVSF, mild-mod tricuspid regurg, moderate pulm pressures, b/l pleural effusions   - c/w metoprolol tartrate 12.5mg BID (for discharge consider metoprolol succinate vs. resuming home Carvedilol at lower dose as per Cards)  - Entresto 49/51

## 2022-09-05 NOTE — PROGRESS NOTE ADULT - PROBLEM SELECTOR PLAN 1
Na at 151->149->147, uptrend from prior 140s, could be related to volume depletion iso diuresis vs DI iso recent neuro event  - serum osm yesterday was 333, urine osm 399  - hyperosmolar hypernatremia likely iso diuretic use, improvement today with downtrending Na, c/w monitor Has been confused since admission with improvement today (9/1). As per family, this occurs every time the patient is sick with similar symptoms. Considerations include underlying dementia, delirium, vs metabolic causes vs neurological vs malignancy  - AAOx1  - cultures with no growth to date, no acute toxicologies found on labs, no history of cirrhosis with low suspicion for ammonia induced, no indication for renal failure for uremic causes  - CT head non-contrast performed, no official read but as per radiology has acute on chronic of R parietal lobe infarct with no indication of hemorrhagic transformation  - neuro consult       - MRI brain wo contrast, MRA H wo contrast, MRA N w contrast -> tentatively scheduled for Tuesday       - asa, atorvastatin 80mg      - TTE with severe global left ventricular systolic dysfunction, no evidence of thrombi

## 2022-09-05 NOTE — PROGRESS NOTE ADULT - PROBLEM SELECTOR PLAN 9
- p/w acute hypotension with MAPs < 65, in the setting of HFrEF as above  - MICU consulted with recommendations to give albumin 5% 250 cc bolus, pt s/p  cc bolus (caution i/s/o HF)  - currently HDS  - TTE shows worsening HFrEF as above DIET: Dash/TLC  DVT Prophylaxis: Heparin gtt  Dispo: Outpatient PT

## 2022-09-05 NOTE — PROGRESS NOTE ADULT - PROBLEM SELECTOR PLAN 7
- Trop T 225-->476 -->495-->379, suspected demand ischemia in setting of non-obstructive CAD  - EKG with prolonged QTc 500s from 600s   - trop now downtrending, no further workup required - possibly i/s/o medication induced QT prolongation  - EKG 8/26 1638, rate 100 bpm,  ms, QTc 691 ms, ventricular paced  - EKG 8/26 2100, rate 94 bpm, Ekaterina 156, , QTc 547 ms, ventricular paced   - EKG 8/30 QTc 525ms, ventricular paced

## 2022-09-05 NOTE — PROGRESS NOTE ADULT - SUBJECTIVE AND OBJECTIVE BOX
INTERVAL HPI/OVERNIGHT EVENTS:  Patient S&E at bedside. No o/n events, patient resting comfortably.     VITAL SIGNS:  T(F): 97.7 (09-05-22 @ 04:00)  HR: 92 (09-05-22 @ 04:00)  BP: 144/81 (09-05-22 @ 04:00)  RR: 16 (09-05-22 @ 04:00)  SpO2: 99% (09-05-22 @ 04:00)  Wt(kg): --    PHYSICAL EXAM:    Constitutional: NAD  Eyes: EOMI, sclera non-icteric  Neck: supple, no masses, no JVD  Respiratory: CTA b/l, good air entry b/l  Cardiovascular: RRR, no M/R/G  Gastrointestinal: soft, NTND, no masses palpable, + BS, no hepatosplenomegaly  Extremities: no c/c/e  Neurological: AAOx 2      MEDICATIONS  (STANDING):  acyclovir   Oral Tab/Cap 400 milliGRAM(s) Oral two times a day  allopurinol 100 milliGRAM(s) Oral daily  atorvastatin 40 milliGRAM(s) Oral at bedtime  chlorhexidine 2% Cloths 1 Application(s) Topical daily  chlorhexidine 4% Liquid 1 Application(s) Topical <User Schedule>  dextrose 5%. 1000 milliLiter(s) (100 mL/Hr) IV Continuous <Continuous>  dextrose 5%. 1000 milliLiter(s) (50 mL/Hr) IV Continuous <Continuous>  dextrose 50% Injectable 25 Gram(s) IV Push once  dextrose 50% Injectable 12.5 Gram(s) IV Push once  dextrose 50% Injectable 25 Gram(s) IV Push once  glucagon  Injectable 1 milliGRAM(s) IntraMuscular once  heparin  Infusion.  Unit(s)/Hr (14 mL/Hr) IV Continuous <Continuous>  insulin lispro (ADMELOG) corrective regimen sliding scale   SubCutaneous Before meals and at bedtime  metoprolol succinate ER 25 milliGRAM(s) Oral daily  multivitamin 1 Tablet(s) Oral daily  sacubitril 49 mG/valsartan 51 mG 1 Tablet(s) Oral every 12 hours    MEDICATIONS  (PRN):  dextrose Oral Gel 15 Gram(s) Oral once PRN Blood Glucose LESS THAN 70 milliGRAM(s)/deciliter  heparin   Injectable 6500 Unit(s) IV Push every 6 hours PRN For aPTT less than 40  heparin   Injectable 3000 Unit(s) IV Push every 6 hours PRN For aPTT between 40 - 57  melatonin 3 milliGRAM(s) Oral at bedtime PRN Insomnia  sodium chloride 0.9% lock flush 10 milliLiter(s) IV Push every 1 hour PRN Pre/post blood products, medications, blood draw, and to maintain line patency      Allergies    No Known Allergies    Intolerances        LABS:                        11.5   9.23  )-----------( 193      ( 05 Sep 2022 06:06 )             38.1     09-05    145  |  105  |  58<H>  ----------------------------<  123<H>  3.1<L>   |  29  |  1.13    Ca    9.1      05 Sep 2022 06:06  Phos  2.9     09-05  Mg     1.9     09-05    TPro  7.4  /  Alb  3.7  /  TBili  1.1  /  DBili  x   /  AST  16  /  ALT  14  /  AlkPhos  127<H>  09-05    PTT - ( 05 Sep 2022 06:06 )  PTT:76.8 sec      RADIOLOGY & ADDITIONAL TESTS:  Studies reviewed.    ASSESSMENT & PLAN:

## 2022-09-06 ENCOUNTER — TRANSCRIPTION ENCOUNTER (OUTPATIENT)
Age: 72
End: 2022-09-06

## 2022-09-06 ENCOUNTER — APPOINTMENT (OUTPATIENT)
Dept: CARDIOLOGY | Facility: CLINIC | Age: 72
End: 2022-09-06

## 2022-09-06 DIAGNOSIS — Z01.818 ENCOUNTER FOR OTHER PREPROCEDURAL EXAMINATION: ICD-10-CM

## 2022-09-06 LAB
ALBUMIN SERPL ELPH-MCNC: 3.2 G/DL — LOW (ref 3.3–5)
ALP SERPL-CCNC: 111 U/L — SIGNIFICANT CHANGE UP (ref 40–120)
ALT FLD-CCNC: 24 U/L — SIGNIFICANT CHANGE UP (ref 10–45)
ANION GAP SERPL CALC-SCNC: 11 MMOL/L — SIGNIFICANT CHANGE UP (ref 5–17)
ANISOCYTOSIS BLD QL: SIGNIFICANT CHANGE UP
APTT BLD: 68.8 SEC — HIGH (ref 27.5–35.5)
AST SERPL-CCNC: 24 U/L — SIGNIFICANT CHANGE UP (ref 10–40)
BASOPHILS # BLD AUTO: 0 K/UL — SIGNIFICANT CHANGE UP (ref 0–0.2)
BASOPHILS NFR BLD AUTO: 0 % — SIGNIFICANT CHANGE UP (ref 0–2)
BILIRUB SERPL-MCNC: 0.9 MG/DL — SIGNIFICANT CHANGE UP (ref 0.2–1.2)
BUN SERPL-MCNC: 51 MG/DL — HIGH (ref 7–23)
CALCIUM SERPL-MCNC: 8.5 MG/DL — SIGNIFICANT CHANGE UP (ref 8.4–10.5)
CHLORIDE SERPL-SCNC: 103 MMOL/L — SIGNIFICANT CHANGE UP (ref 96–108)
CO2 SERPL-SCNC: 28 MMOL/L — SIGNIFICANT CHANGE UP (ref 22–31)
CREAT SERPL-MCNC: 0.93 MG/DL — SIGNIFICANT CHANGE UP (ref 0.5–1.3)
CULTURE RESULTS: SIGNIFICANT CHANGE UP
DACRYOCYTES BLD QL SMEAR: SLIGHT — SIGNIFICANT CHANGE UP
EGFR: 88 ML/MIN/1.73M2 — SIGNIFICANT CHANGE UP
ELLIPTOCYTES BLD QL SMEAR: SLIGHT — SIGNIFICANT CHANGE UP
EOSINOPHIL # BLD AUTO: 0.28 K/UL — SIGNIFICANT CHANGE UP (ref 0–0.5)
EOSINOPHIL NFR BLD AUTO: 3.5 % — SIGNIFICANT CHANGE UP (ref 0–6)
GLUCOSE BLDC GLUCOMTR-MCNC: 110 MG/DL — HIGH (ref 70–99)
GLUCOSE BLDC GLUCOMTR-MCNC: 116 MG/DL — HIGH (ref 70–99)
GLUCOSE BLDC GLUCOMTR-MCNC: 125 MG/DL — HIGH (ref 70–99)
GLUCOSE BLDC GLUCOMTR-MCNC: 180 MG/DL — HIGH (ref 70–99)
GLUCOSE SERPL-MCNC: 159 MG/DL — HIGH (ref 70–99)
HAPTOGLOB SERPL-MCNC: 124 MG/DL — SIGNIFICANT CHANGE UP (ref 34–200)
HAPTOGLOB SERPL-MCNC: 143 MG/DL — SIGNIFICANT CHANGE UP (ref 34–200)
HCT VFR BLD CALC: 34.5 % — LOW (ref 39–50)
HGB BLD-MCNC: 10.6 G/DL — LOW (ref 13–17)
LDH SERPL L TO P-CCNC: 253 U/L — HIGH (ref 50–242)
LYMPHOCYTES # BLD AUTO: 0 % — LOW (ref 13–44)
LYMPHOCYTES # BLD AUTO: 0 K/UL — LOW (ref 1–3.3)
MACROCYTES BLD QL: SLIGHT — SIGNIFICANT CHANGE UP
MAGNESIUM SERPL-MCNC: 1.8 MG/DL — SIGNIFICANT CHANGE UP (ref 1.6–2.6)
MANUAL SMEAR VERIFICATION: SIGNIFICANT CHANGE UP
MCHC RBC-ENTMCNC: 26.6 PG — LOW (ref 27–34)
MCHC RBC-ENTMCNC: 30.7 GM/DL — LOW (ref 32–36)
MCV RBC AUTO: 86.7 FL — SIGNIFICANT CHANGE UP (ref 80–100)
MICROCYTES BLD QL: SLIGHT — SIGNIFICANT CHANGE UP
MONOCYTES # BLD AUTO: 0.07 K/UL — SIGNIFICANT CHANGE UP (ref 0–0.9)
MONOCYTES NFR BLD AUTO: 0.9 % — LOW (ref 2–14)
NEUTROPHILS # BLD AUTO: 7.55 K/UL — HIGH (ref 1.8–7.4)
NEUTROPHILS NFR BLD AUTO: 95.6 % — HIGH (ref 43–77)
PHOSPHATE SERPL-MCNC: 2.4 MG/DL — LOW (ref 2.5–4.5)
PLAT MORPH BLD: NORMAL — SIGNIFICANT CHANGE UP
PLATELET # BLD AUTO: 177 K/UL — SIGNIFICANT CHANGE UP (ref 150–400)
POLYCHROMASIA BLD QL SMEAR: SLIGHT — SIGNIFICANT CHANGE UP
POTASSIUM SERPL-MCNC: 3.1 MMOL/L — LOW (ref 3.5–5.3)
POTASSIUM SERPL-SCNC: 3.1 MMOL/L — LOW (ref 3.5–5.3)
PROT SERPL-MCNC: 6.6 G/DL — SIGNIFICANT CHANGE UP (ref 6–8.3)
RBC # BLD: 3.98 M/UL — LOW (ref 4.2–5.8)
RBC # FLD: 18.9 % — HIGH (ref 10.3–14.5)
RBC BLD AUTO: SIGNIFICANT CHANGE UP
SARS-COV-2 RNA SPEC QL NAA+PROBE: SIGNIFICANT CHANGE UP
SCHISTOCYTES BLD QL AUTO: SLIGHT — SIGNIFICANT CHANGE UP
SODIUM SERPL-SCNC: 142 MMOL/L — SIGNIFICANT CHANGE UP (ref 135–145)
SPECIMEN SOURCE: SIGNIFICANT CHANGE UP
TARGETS BLD QL SMEAR: SLIGHT — SIGNIFICANT CHANGE UP
URATE SERPL-MCNC: 8.4 MG/DL — SIGNIFICANT CHANGE UP (ref 3.4–8.8)
WBC # BLD: 7.9 K/UL — SIGNIFICANT CHANGE UP (ref 3.8–10.5)
WBC # FLD AUTO: 7.9 K/UL — SIGNIFICANT CHANGE UP (ref 3.8–10.5)

## 2022-09-06 PROCEDURE — 99233 SBSQ HOSP IP/OBS HIGH 50: CPT | Mod: GC

## 2022-09-06 PROCEDURE — 70547 MR ANGIOGRAPHY NECK W/O DYE: CPT | Mod: 26

## 2022-09-06 PROCEDURE — 70551 MRI BRAIN STEM W/O DYE: CPT | Mod: 26

## 2022-09-06 PROCEDURE — 70544 MR ANGIOGRAPHY HEAD W/O DYE: CPT | Mod: 26,59

## 2022-09-06 PROCEDURE — 99232 SBSQ HOSP IP/OBS MODERATE 35: CPT

## 2022-09-06 RX ORDER — POTASSIUM CHLORIDE 20 MEQ
40 PACKET (EA) ORAL EVERY 4 HOURS
Refills: 0 | Status: COMPLETED | OUTPATIENT
Start: 2022-09-06 | End: 2022-09-06

## 2022-09-06 RX ADMIN — HEPARIN SODIUM 1600 UNIT(S)/HR: 5000 INJECTION INTRAVENOUS; SUBCUTANEOUS at 22:22

## 2022-09-06 RX ADMIN — SACUBITRIL AND VALSARTAN 1 TABLET(S): 24; 26 TABLET, FILM COATED ORAL at 05:20

## 2022-09-06 RX ADMIN — SACUBITRIL AND VALSARTAN 1 TABLET(S): 24; 26 TABLET, FILM COATED ORAL at 17:00

## 2022-09-06 RX ADMIN — Medication 100 MILLIGRAM(S): at 11:26

## 2022-09-06 RX ADMIN — Medication 40 MILLIEQUIVALENT(S): at 09:35

## 2022-09-06 RX ADMIN — CHLORHEXIDINE GLUCONATE 1 APPLICATION(S): 213 SOLUTION TOPICAL at 11:40

## 2022-09-06 RX ADMIN — Medication 400 MILLIGRAM(S): at 05:20

## 2022-09-06 RX ADMIN — Medication 40 MILLIEQUIVALENT(S): at 14:56

## 2022-09-06 RX ADMIN — Medication 1: at 16:14

## 2022-09-06 RX ADMIN — Medication 1 TABLET(S): at 11:26

## 2022-09-06 RX ADMIN — HEPARIN SODIUM 1600 UNIT(S)/HR: 5000 INJECTION INTRAVENOUS; SUBCUTANEOUS at 07:29

## 2022-09-06 RX ADMIN — ATORVASTATIN CALCIUM 40 MILLIGRAM(S): 80 TABLET, FILM COATED ORAL at 22:22

## 2022-09-06 RX ADMIN — Medication 400 MILLIGRAM(S): at 17:00

## 2022-09-06 RX ADMIN — CHLORHEXIDINE GLUCONATE 1 APPLICATION(S): 213 SOLUTION TOPICAL at 05:21

## 2022-09-06 RX ADMIN — Medication 25 MILLIGRAM(S): at 05:20

## 2022-09-06 NOTE — PROGRESS NOTE ADULT - PROBLEM SELECTOR PLAN 1
Has been confused since admission with improvement today (9/1). As per family, this occurs every time the patient is sick with similar symptoms. Considerations include underlying dementia, delirium, vs metabolic causes vs neurological vs malignancy  - AAOx1  - cultures with no growth to date, no acute toxicologies found on labs, no history of cirrhosis with low suspicion for ammonia induced, no indication for renal failure for uremic causes  - CT head non-contrast performed, no official read but as per radiology has acute on chronic of R parietal lobe infarct with no indication of hemorrhagic transformation  - neuro consult       - MRI brain wo contrast, MRA H wo contrast, MRA N w contrast -> tentatively scheduled for Tuesday       - asa, atorvastatin 80mg      - TTE with severe global left ventricular systolic dysfunction, no evidence of thrombi Has been confused since admission with improvement today (9/1). As per family, this occurs every time the patient is sick with similar symptoms. Considerations include underlying dementia, delirium, vs metabolic causes vs neurological vs malignancy  - AAOx2  - cultures with no growth to date, no acute toxicologies found on labs, no history of cirrhosis with low suspicion for ammonia induced, no indication for renal failure for uremic causes  - CT head non-contrast performed, no official read but as per radiology has acute on chronic of R parietal lobe infarct with no indication of hemorrhagic transformation  - neuro consult       - MRI brain wo contrast, MRA H wo contrast, MRA N w contrast -> tentatively scheduled for Tuesday       - asa, atorvastatin 80mg      - TTE with severe global left ventricular systolic dysfunction, no evidence of thrombi

## 2022-09-06 NOTE — PROGRESS NOTE ADULT - ASSESSMENT
72 year old male with HTN, CKD stage II, complete heart block (PPM in place), HLD, HFrEF (45% in 2019), and DLBCL (tx with R-CHOP in 2003, with relapse in 2009 treated with BR) now with recently diagnosed grade 3 follicular lymphoma on 8/23 who presents with anemia and SOB, patient was pending a surgical biopsy on 8/25. Admitted for TLS and rasburicase-triggered G6PD hemolytic anemia. Given that patient has developed confusion this admission and is now AOx1, primary team obtained a CTH, which shows acute/subacute R parietal infarction (larger in size when compared with 5/2019), chronic lacunar infarct in R basal ganglia, mild chronic white matter microvascular disease, no hemorrhagic transformation. Denies headache, weakness, numbness, dizziness, visual disturbances. NIHSS 2-did not know month and age. Of note, patient has had multiple prior episodes of confusion and cognitive difficulties.     CTH, which shows acute/subacute R parietal infarction, chronic R BG infarct noted   LDL 81   A1c 5.4     Impression:  R parietal infarction,  likely 2/2 AF     Recommendations:   - would be low risk for bx.  if heparin held, resume when able after procedure ; plan for bx wednesday   - stroke is now at least subacute appearing.  can likely start titrating BP to normotension.  vessel imaging MRA H/N to help determine if any athero. if any severe stenosis may need to avoid hypotension   - heparin for AF.  no need for asa from stroke standpoint when on AC ; now held   - High dose statin therapy - atorvastatin 40mg PO daily. LDL goal <70mg/dL.  - MRI brain w/ and w/o , and MRA H/N pending   - TTE, no need from stroke standpoint   - telemetry  - PT/OT/SS/SLP, OOBC  - check FS, glucose control <180  - GI/DVT ppx  - Thank you for allowing me to participate in the care of this patient. Call with questions.  Stone Mo MD  Vascular Neurology  Office: [phone].

## 2022-09-06 NOTE — PROCEDURE NOTE - ADDITIONAL PROCEDURE DETAILS
PRE MRI SCAN:  1) Normal atrial sensing, normal pacing thresholds, stable lead impedence. RV sensing not tested.  2) Battery Longevity: 7.2 years  3) Changes Made: MRI mode turned on, mode changed to DOO @ 90 BPM  6) Call post MRI to reprogam .    [id number]
POST MRI SCAN:  1) Normal atrial sensing, normal pacing thresholds, stable lead impedence. RV sensing not measured  2) Battery Longevity: 7.1 years  3) Changes Made: MRI mode turned off, mode returned to DDD @ 60 BPM      [id number]
Indication for interrogation: HF  Presenting rhythm: SR 80s with AV sync BiV pacing  Measured data WNL, normal pacemaker function, Pt is pacemaker dependent  Stored data revealed brief NSVT lasting 1-2 seconds  Optivol elevated since 1/2022

## 2022-09-06 NOTE — PROGRESS NOTE ADULT - PROBLEM SELECTOR PLAN 1
-C/w Entresto 49-51 BID  -increase metoprolol to 50 mg ER   -Would discuss with heme/onc if TLS is still a concern would hold off spironolactone otherwise patient has been hypokalemic and would likely benefit from MAR

## 2022-09-06 NOTE — PROGRESS NOTE ADULT - SUBJECTIVE AND OBJECTIVE BOX
PROGRESS NOTE:   Authored by Dave Vanegas MD   Patient is a 72y old  Male who presents with a chief complaint of follicular  B cell lymphoma relapse (05 Sep 2022 11:04)      SUBJECTIVE / OVERNIGHT EVENTS: No acute events overnight. No chest pain,  palpitations, dyspnea, abdominal pain. nausea, vomiting, diarrhea, blurry vision, dysuria, lightheadedness, dizziness.    ADDITIONAL REVIEW OF SYSTEMS:    MEDICATIONS  (STANDING):  acyclovir   Oral Tab/Cap 400 milliGRAM(s) Oral two times a day  allopurinol 100 milliGRAM(s) Oral daily  atorvastatin 40 milliGRAM(s) Oral at bedtime  chlorhexidine 2% Cloths 1 Application(s) Topical daily  chlorhexidine 4% Liquid 1 Application(s) Topical <User Schedule>  dextrose 5%. 1000 milliLiter(s) (100 mL/Hr) IV Continuous <Continuous>  dextrose 5%. 1000 milliLiter(s) (50 mL/Hr) IV Continuous <Continuous>  dextrose 50% Injectable 25 Gram(s) IV Push once  dextrose 50% Injectable 12.5 Gram(s) IV Push once  dextrose 50% Injectable 25 Gram(s) IV Push once  glucagon  Injectable 1 milliGRAM(s) IntraMuscular once  heparin  Infusion.  Unit(s)/Hr (14 mL/Hr) IV Continuous <Continuous>  insulin lispro (ADMELOG) corrective regimen sliding scale   SubCutaneous Before meals and at bedtime  metoprolol succinate ER 25 milliGRAM(s) Oral daily  multivitamin 1 Tablet(s) Oral daily  sacubitril 49 mG/valsartan 51 mG 1 Tablet(s) Oral every 12 hours    MEDICATIONS  (PRN):  dextrose Oral Gel 15 Gram(s) Oral once PRN Blood Glucose LESS THAN 70 milliGRAM(s)/deciliter  heparin   Injectable 6500 Unit(s) IV Push every 6 hours PRN For aPTT less than 40  heparin   Injectable 3000 Unit(s) IV Push every 6 hours PRN For aPTT between 40 - 57  melatonin 3 milliGRAM(s) Oral at bedtime PRN Insomnia  sodium chloride 0.9% lock flush 10 milliLiter(s) IV Push every 1 hour PRN Pre/post blood products, medications, blood draw, and to maintain line patency      CAPILLARY BLOOD GLUCOSE      POCT Blood Glucose.: 180 mg/dL (05 Sep 2022 20:57)  POCT Blood Glucose.: 128 mg/dL (05 Sep 2022 16:15)  POCT Blood Glucose.: 180 mg/dL (05 Sep 2022 11:55)  POCT Blood Glucose.: 135 mg/dL (05 Sep 2022 07:32)    I&O's Summary    04 Sep 2022 07:01  -  05 Sep 2022 07:00  --------------------------------------------------------  IN: 790 mL / OUT: 200 mL / NET: 590 mL    05 Sep 2022 07:01  -  06 Sep 2022 06:59  --------------------------------------------------------  IN: 400 mL / OUT: 1800 mL / NET: -1400 mL        PHYSICAL EXAM:  Vital Signs Last 24 Hrs  T(C): 36.8 (06 Sep 2022 04:00), Max: 36.8 (06 Sep 2022 04:00)  T(F): 98.2 (06 Sep 2022 04:00), Max: 98.2 (06 Sep 2022 04:00)  HR: 86 (06 Sep 2022 04:00) (82 - 92)  BP: 128/68 (06 Sep 2022 04:00) (105/68 - 128/68)  BP(mean): --  RR: 18 (06 Sep 2022 04:00) (17 - 18)  SpO2: 95% (06 Sep 2022 04:00) (95% - 96%)    Parameters below as of 05 Sep 2022 20:07  Patient On (Oxygen Delivery Method): room air        GENERAL: No apparent distress.   HEAD:  Atraumatic, Normocephalic  EYES: EOMI, PERRLA, conjunctiva and sclera clear  NECK: Supple, no lymphadenopathy, no JVD  CHEST/LUNG: Clear to auscultation bilateral and symmetric; No wheezes, rales, or rhonchi  HEART: S1 normal, loud S2. Regular rate and rhythm; No murmurs, rubs, or gallops  ABDOMEN: Soft, non-tender, non-distended; normal bowel sounds  EXTREMITIES:  2+ peripheral pulses b/l, No clubbing, cyanosis, or edema  NEUROLOGY: A&O x 1, no focal deficits  SKIN: No rashes or lesions. Mass upper back, midline.      LABS:                        11.5   9.23  )-----------( 193      ( 05 Sep 2022 06:06 )             38.1     09-05    141  |  103  |  57<H>  ----------------------------<  171<H>  4.2   |  26  |  1.02    Ca    9.0      05 Sep 2022 17:29  Phos  2.7     09-05  Mg     1.8     09-05    TPro  7.2  /  Alb  3.3  /  TBili  1.0  /  DBili  x   /  AST  39  /  ALT  33  /  AlkPhos  140<H>  09-05    PTT - ( 05 Sep 2022 06:06 )  PTT:76.8 sec            RADIOLOGY & ADDITIONAL TESTS:  Lab Results Reviewed   Imaging Reviewed  Electrocardiogram Reviewed   PROGRESS NOTE:   Authored by Dave Vanegas MD   Patient is a 72y old  Male who presents with a chief complaint of follicular  B cell lymphoma relapse (05 Sep 2022 11:04)      SUBJECTIVE / OVERNIGHT EVENTS: No acute events overnight. No chest pain,  palpitations, dyspnea, abdominal pain. nausea, vomiting, diarrhea, blurry vision, dysuria, lightheadedness, dizziness.    ADDITIONAL REVIEW OF SYSTEMS:    MEDICATIONS  (STANDING):  acyclovir   Oral Tab/Cap 400 milliGRAM(s) Oral two times a day  allopurinol 100 milliGRAM(s) Oral daily  atorvastatin 40 milliGRAM(s) Oral at bedtime  chlorhexidine 2% Cloths 1 Application(s) Topical daily  chlorhexidine 4% Liquid 1 Application(s) Topical <User Schedule>  dextrose 5%. 1000 milliLiter(s) (100 mL/Hr) IV Continuous <Continuous>  dextrose 5%. 1000 milliLiter(s) (50 mL/Hr) IV Continuous <Continuous>  dextrose 50% Injectable 25 Gram(s) IV Push once  dextrose 50% Injectable 12.5 Gram(s) IV Push once  dextrose 50% Injectable 25 Gram(s) IV Push once  glucagon  Injectable 1 milliGRAM(s) IntraMuscular once  heparin  Infusion.  Unit(s)/Hr (14 mL/Hr) IV Continuous <Continuous>  insulin lispro (ADMELOG) corrective regimen sliding scale   SubCutaneous Before meals and at bedtime  metoprolol succinate ER 25 milliGRAM(s) Oral daily  multivitamin 1 Tablet(s) Oral daily  sacubitril 49 mG/valsartan 51 mG 1 Tablet(s) Oral every 12 hours    MEDICATIONS  (PRN):  dextrose Oral Gel 15 Gram(s) Oral once PRN Blood Glucose LESS THAN 70 milliGRAM(s)/deciliter  heparin   Injectable 6500 Unit(s) IV Push every 6 hours PRN For aPTT less than 40  heparin   Injectable 3000 Unit(s) IV Push every 6 hours PRN For aPTT between 40 - 57  melatonin 3 milliGRAM(s) Oral at bedtime PRN Insomnia  sodium chloride 0.9% lock flush 10 milliLiter(s) IV Push every 1 hour PRN Pre/post blood products, medications, blood draw, and to maintain line patency      CAPILLARY BLOOD GLUCOSE      POCT Blood Glucose.: 180 mg/dL (05 Sep 2022 20:57)  POCT Blood Glucose.: 128 mg/dL (05 Sep 2022 16:15)  POCT Blood Glucose.: 180 mg/dL (05 Sep 2022 11:55)  POCT Blood Glucose.: 135 mg/dL (05 Sep 2022 07:32)    I&O's Summary    04 Sep 2022 07:01  -  05 Sep 2022 07:00  --------------------------------------------------------  IN: 790 mL / OUT: 200 mL / NET: 590 mL    05 Sep 2022 07:01  -  06 Sep 2022 06:59  --------------------------------------------------------  IN: 400 mL / OUT: 1800 mL / NET: -1400 mL        PHYSICAL EXAM:  Vital Signs Last 24 Hrs  T(C): 36.8 (06 Sep 2022 04:00), Max: 36.8 (06 Sep 2022 04:00)  T(F): 98.2 (06 Sep 2022 04:00), Max: 98.2 (06 Sep 2022 04:00)  HR: 86 (06 Sep 2022 04:00) (82 - 92)  BP: 128/68 (06 Sep 2022 04:00) (105/68 - 128/68)  BP(mean): --  RR: 18 (06 Sep 2022 04:00) (17 - 18)  SpO2: 95% (06 Sep 2022 04:00) (95% - 96%)    Parameters below as of 05 Sep 2022 20:07  Patient On (Oxygen Delivery Method): room air        GENERAL: No apparent distress.   HEAD:  Atraumatic, Normocephalic  EYES: EOMI, PERRLA, conjunctiva and sclera clear  NECK: Supple, no lymphadenopathy, no JVD  CHEST/LUNG: Clear to auscultation bilateral and symmetric; No wheezes, rales, or rhonchi  HEART: S1 normal, loud S2. Regular rate and rhythm; No murmurs, rubs, or gallops  ABDOMEN: Soft, non-tender, non-distended; normal bowel sounds  EXTREMITIES:  2+ peripheral pulses b/l, No clubbing, cyanosis, or edema  NEUROLOGY: A&O x 2, no focal deficits  SKIN: No rashes or lesions. Mass upper back, midline.      LABS:                        11.5   9.23  )-----------( 193      ( 05 Sep 2022 06:06 )             38.1     09-05    141  |  103  |  57<H>  ----------------------------<  171<H>  4.2   |  26  |  1.02    Ca    9.0      05 Sep 2022 17:29  Phos  2.7     09-05  Mg     1.8     09-05    TPro  7.2  /  Alb  3.3  /  TBili  1.0  /  DBili  x   /  AST  39  /  ALT  33  /  AlkPhos  140<H>  09-05    PTT - ( 05 Sep 2022 06:06 )  PTT:76.8 sec            RADIOLOGY & ADDITIONAL TESTS:  Lab Results Reviewed   Imaging Reviewed  Electrocardiogram Reviewed

## 2022-09-06 NOTE — PROGRESS NOTE ADULT - ASSESSMENT
72M with significant history of HTN, CKD stage II, complete heart block (PPM in place), HLD, HFrEF (45% in 2019; 20% during this admission), and DLBCL (tx with R-CHOP in 2003, with relapse in 2009 treated now with multiple areas of palpable lymphadenopathy with biopsies shown to be at least grade IIIA follicular lymphoma. Patient admitted with tumor lysis syndrome, which was treated with rasburicase and patient developed hemolysis due to G6PD deficiency.     Surgical Oncology consulted for surgical biopsy for definite diagnosis.    PLAN:    - no acute surgical intervention indicated    - pt will likely require left back mass biopsy during this admission to further direct care   - new acute on chronic stroke indicated in head CT, please consult neurology for optimization /stratification of procedure  - Will schedule OR for biopsy on Wednesday(9/7/2022) if optimized from cardiology, neurology and medicine team, Please document medical/cardiac/neurology  clearance for excisional biopsy for the back mass.  - Plan discussed with Attending, Dr. Tavarez       [facility]  [id number]

## 2022-09-06 NOTE — PROGRESS NOTE ADULT - SUBJECTIVE AND OBJECTIVE BOX
Surgery Progress Note    INTERVAl/SUBJECTIVE: No acute event overnight. Patient seen and examined in am rounds, found to be without acute distress.      Vital Signs Last 24 Hrs  T(C): 36.8 (06 Sep 2022 04:00), Max: 36.8 (06 Sep 2022 04:00)  T(F): 98.2 (06 Sep 2022 04:00), Max: 98.2 (06 Sep 2022 04:00)  HR: 86 (06 Sep 2022 04:00) (82 - 92)  BP: 128/68 (06 Sep 2022 04:00) (105/68 - 128/68)  BP(mean): --  RR: 18 (06 Sep 2022 04:00) (17 - 18)  SpO2: 95% (06 Sep 2022 04:00) (95% - 96%)    Parameters below as of 05 Sep 2022 20:07  Patient On (Oxygen Delivery Method): room air        Physical Exam:  General: Appears well, NAD  CHEST: breathing comfortably. Back mass the same size, no obvious change.  CV: appears well perfused  Abdomen: soft, nontender, nondistended, no rebound or guarding  Extremities: Grossly symmetric    LABS:                        11.5   9.23  )-----------( 193      ( 05 Sep 2022 06:06 )             38.1     09-05    141  |  103  |  57<H>  ----------------------------<  171<H>  4.2   |  26  |  1.02    Ca    9.0      05 Sep 2022 17:29  Phos  2.7     09-05  Mg     1.8     09-05    TPro  7.2  /  Alb  3.3  /  TBili  1.0  /  DBili  x   /  AST  39  /  ALT  33  /  AlkPhos  140<H>  09-05    PTT - ( 05 Sep 2022 06:06 )  PTT:76.8 sec      INs and OUTs:    09-05-22 @ 07:01  -  09-06-22 @ 07:00  --------------------------------------------------------  IN: 400 mL / OUT: 1800 mL / NET: -1400 mL     Surgery Progress Note    INTERVAl/SUBJECTIVE: No acute event overnight. Patient seen and examined in am rounds, found to be without acute distress.      Vital Signs Last 24 Hrs  T(C): 36.8 (06 Sep 2022 04:00), Max: 36.8 (06 Sep 2022 04:00)  T(F): 98.2 (06 Sep 2022 04:00), Max: 98.2 (06 Sep 2022 04:00)  HR: 86 (06 Sep 2022 04:00) (82 - 92)  BP: 128/68 (06 Sep 2022 04:00) (105/68 - 128/68)  BP(mean): --  RR: 18 (06 Sep 2022 04:00) (17 - 18)  SpO2: 95% (06 Sep 2022 04:00) (95% - 96%)    Parameters below as of 05 Sep 2022 20:07  Patient On (Oxygen Delivery Method): room air        Physical Exam:  General: Appears well, NAD, A&O*1  CHEST: breathing comfortably. Back mass the same size, no obvious change.  CV: appears well perfused  Abdomen: soft, nontender, nondistended, no rebound or guarding  Extremities: Grossly symmetric    LABS:                        11.5   9.23  )-----------( 193      ( 05 Sep 2022 06:06 )             38.1     09-05    141  |  103  |  57<H>  ----------------------------<  171<H>  4.2   |  26  |  1.02    Ca    9.0      05 Sep 2022 17:29  Phos  2.7     09-05  Mg     1.8     09-05    TPro  7.2  /  Alb  3.3  /  TBili  1.0  /  DBili  x   /  AST  39  /  ALT  33  /  AlkPhos  140<H>  09-05    PTT - ( 05 Sep 2022 06:06 )  PTT:76.8 sec      INs and OUTs:    09-05-22 @ 07:01  -  09-06-22 @ 07:00  --------------------------------------------------------  IN: 400 mL / OUT: 1800 mL / NET: -1400 mL

## 2022-09-06 NOTE — PROGRESS NOTE ADULT - PROBLEM SELECTOR PLAN 5
Na at 151->149->147, uptrend from prior 140s, could be related to volume depletion iso diuresis vs DI iso recent neuro event  - serum osm yesterday was 333, urine osm 399  - hyperosmolar hypernatremia likely iso diuretic use, improvement today with downtrending Na, c/w monitor  - resolved

## 2022-09-06 NOTE — CHART NOTE - NSCHARTNOTEFT_GEN_A_CORE
SURGERY PRE-OP NOTE    Preop Diagnosis: Lymphadenopathy  Planned Procedure: Excision of left back mass    Pertinent Labs:                            11.5   9.23  )-----------( 193      ( 05 Sep 2022 06:06 )             38.1       09-06    142  |  103  |  51<H>  ----------------------------<  159<H>  3.1<L>   |  28  |  0.93    Ca    8.5      06 Sep 2022 07:29  Phos  2.4     09-06  Mg     1.8     09-06    TPro  6.6  /  Alb  3.2<L>  /  TBili  0.9  /  DBili  x   /  AST  24  /  ALT  24  /  AlkPhos  111  09-06  PTT - ( 06 Sep 2022 07:40 )  PTT:68.8 sec  ---------------------------------------------  Type + Screen (09.01.22 @ 21:24)    ABO Interpretation: O    Rh Interpretation: Positive    Antibody Screen: Negative  Type + Screen (08.28.22 @ 16:30)    ABO Interpretation: O    Rh Interpretation: Positive    Antibody Screen: Negative  ---------------------------------------------  COVID-19 PCR . (08.31.22 @ 16:29)    COVID-19 PCR: Wilmer: ****    ---------------------------------------------  12 Lead ECG (08.30.22 @ 09:32)   Ventricular Rate 82 BPM  Atrial Rate 82 BPM  P-R Interval 178 ms  QRS Duration 158 ms  Q-T Interval 452 ms  QTC Calculation(Bazett) 528 ms  P Axis 37 degrees  R Axis -40 degrees  T Axis 157 degrees  Diagnosis Line ATRIAL SENSED, VENTRICULAR PACED RHYTHM  Confirmed by SOLOMON Corrales Zlata ([id number]) on 8/30/2022 2:28:38 PM    --------------------------------------------  ACC: [id number] EXAM:  XR CHEST PA LAT 2V                         PROCEDURE DATE:  09/04/2022    CLINICAL INFORMATION: Pacemaker. Pre-MRI    FINDINGS:  There is a biventricular pacemaker seen on the left with its leads   unchanged in position. There are no broken or abandoned leads. A wireless   pacemaker is also seen overlying the right ventricle.  The heart is mildly enlarged.  There is improved aeration at the right lung base with minimal residual   atelectasis.  There are no pleural effusions.  The visualized osseous structures are unremarkable for the patient's age.    IMPRESSION: Improved aeration at the right lung base with minimal   residual atelectasis remaining.    --- End of Report ---  KATLIN REYNOLDS MD; Attending Radiologist  This document has been electronically signed. Sep  5 2022  2:43PM  ----------------------------------------------------------------------------------------    Plan:  72M with significant history of HTN, CKD stage II, complete heart block (PPM in place), HLD, HFrEF (45% in 2019; 20% during this admission), and DLBCL (tx with R-CHOP in 2003, with relapse in 2009 treated now with multiple areas of palpable lymphadenopathy with biopsies shown to be at least grade IIIA follicular lymphoma. Patient admitted with tumor lysis syndrome, which was treated with rasburicase and patient developed hemolysis due to G6PD deficiency. Surgical Oncology consulted for surgical biopsy for definite diagnosis, and patient scheduled for OR tomorrow.      - Neurology clearance: Would be low risk for bx.  if heparin held, resume when able after procedure 9/5 note  - Medicine clearance: Patient medically optimized for excisional biopsy under local anesthesia with general surgery on 9/6/22 note  - **Pending cardiac HF clearance**  - **REPEAT covid pending**  - **AM LABS ordered**  - NPO/IVF after midnight  - Consent to be obtained today    [phone]  [facility]

## 2022-09-06 NOTE — PROGRESS NOTE ADULT - PROBLEM SELECTOR PLAN 6
History of HFrEF EF 45%, diffuse LV hypokinesis, mod pulmHTN; followed by cardiology outpatient, elevated proBNP with uptrending troponins, now downtrending. TTE (8/27) ef=20%, severe MR, mild LV enlargement, normal RA, RV enlargement with decreased RVSF, mild-mod tricuspid regurg, moderate pulm pressures, b/l pleural effusions   - c/w metoprolol tartrate 12.5mg BID (for discharge consider metoprolol succinate vs. resuming home Carvedilol at lower dose as per Cards)  - Entresto 49/51

## 2022-09-06 NOTE — PROGRESS NOTE ADULT - PROBLEM SELECTOR PLAN 4
: Patient found to have acute-subacute stroke   - Defer pharmacologic stress test given stroke; obtain neuro clearance.

## 2022-09-06 NOTE — PROGRESS NOTE ADULT - ASSESSMENT
71 yo M w/ PMH CHB s/p PPM upgraded to CRT-P, HFrEF 2/2 chemo, DLBCL s/p chemo, HTN, nonobstructive CAD, Aflutter on apixaban who presented w/ SOB and anemia. 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 He was started on rasburicase for TLS but with notable G6PD hemolytics anemia. Found to be in heart failure exacerbation. Was started on BiPAP given his tachypnea. CXRAY showed R pleural effusion with known R middle lobe opacity.    Patient was planned for excisional biopsy under general anesthesia. However, patient with severe MR on ECHO, was still fluid overloaded, and elevated troponin without re-evaluation of his coronaries; thus was not medically optimized for general anesthesia, had recommended local anesthesia for biopsy of tissue sample. Heme-Onc following; decided to forgo biopsy and treat for presumed diagnosis of relapsed FL. Hospital course complicated by acute/subacute R parietal stroke.    Improved in terms of volume status with diuresis, weaned off BIPAP.       Echo:  9/2/22: LVEF 30%, severe MR, mildly decreased RV function. Similar to prior echo  8/27/22: LVEF 20%, decreased RV function, severe MR  11/7/2019: LVEF 40%, moderate pulmonary hypertension, enlarged LA size, severe mitral regurgitation    Cardiac Cath: 5/2019, 70% 1st diagonal, 60% distal circumflex, otherwise no occlusive disease      Pacemaker: 9/30/2019, [employer] [id number] BiV DDDR

## 2022-09-06 NOTE — PROGRESS NOTE ADULT - SUBJECTIVE AND OBJECTIVE BOX
Interval History:  NAEON     Medications:  acyclovir   Oral Tab/Cap 400 milliGRAM(s) Oral two times a day  allopurinol 100 milliGRAM(s) Oral daily  atorvastatin 40 milliGRAM(s) Oral at bedtime  chlorhexidine 2% Cloths 1 Application(s) Topical daily  chlorhexidine 4% Liquid 1 Application(s) Topical <User Schedule>  dextrose 5%. 1000 milliLiter(s) IV Continuous <Continuous>  dextrose 5%. 1000 milliLiter(s) IV Continuous <Continuous>  dextrose 50% Injectable 25 Gram(s) IV Push once  dextrose 50% Injectable 12.5 Gram(s) IV Push once  dextrose 50% Injectable 25 Gram(s) IV Push once  dextrose Oral Gel 15 Gram(s) Oral once PRN  glucagon  Injectable 1 milliGRAM(s) IntraMuscular once  heparin   Injectable 6500 Unit(s) IV Push every 6 hours PRN  heparin   Injectable 3000 Unit(s) IV Push every 6 hours PRN  heparin  Infusion.  Unit(s)/Hr IV Continuous <Continuous>  insulin lispro (ADMELOG) corrective regimen sliding scale   SubCutaneous Before meals and at bedtime  melatonin 3 milliGRAM(s) Oral at bedtime PRN  metoprolol succinate ER 25 milliGRAM(s) Oral daily  multivitamin 1 Tablet(s) Oral daily  sacubitril 49 mG/valsartan 51 mG 1 Tablet(s) Oral every 12 hours  sodium chloride 0.9% lock flush 10 milliLiter(s) IV Push every 1 hour PRN      Vitals:  T(C): 37.2 (22 @ 14:28), Max: 37.2 (22 @ 14:28)  HR: 93 (22 @ 14:28) (86 - 93)  BP: 144/77 (22 @ 14:28) (125/68 - 144/77)  BP(mean): --  RR: 18 (22 @ 14:28) (17 - 18)  SpO2: 95% (22 @ 14:28) (95% - 96%)    Daily     Daily Weight in k.3 (06 Sep 2022 08:02)        I&O's Summary    05 Sep 2022 07:01  -  06 Sep 2022 07:00  --------------------------------------------------------  IN: 400 mL / OUT: 1900 mL / NET: -1500 mL    06 Sep 2022 07:01  -  06 Sep 2022 16:39  --------------------------------------------------------  IN: 440 mL / OUT: 1200 mL / NET: -760 mL        Physical Exam:  Patient was not in room      Labs:                        10.6   7.90  )-----------( 177      ( 06 Sep 2022 15:07 )             34.5         142  |  103  |  51<H>  ----------------------------<  159<H>  3.1<L>   |  28  |  0.93    Ca    8.5      06 Sep 2022 07:29  Phos  2.4     -  Mg     1.8         TPro  6.6  /  Alb  3.2<L>  /  TBili  0.9  /  DBili  x   /  AST  24  /  ALT  24  /  AlkPhos  111  -    PTT - ( 06 Sep 2022 07:40 )  PTT:68.8 sec

## 2022-09-06 NOTE — PROGRESS NOTE ADULT - PROBLEM SELECTOR PLAN 5
From chart the patient seems to not have an acute heart failure exacerbation at this time and is euvolemic. Given his multiple medical and cardiac co-morbids including HFrEF, severe MR, non-obstructive CAD, aflutter, recent stroke, active malignancy, and his initial troponin elevation for which he did not have an ischemic evaluation - he is at elevated risk for any procedure. That being said his recent stroke precludes him from non-invasive coronary assessment. Pending my attending evaluation is medically optimized for a excisional biopsy with local anesthesia and would no pursue any further cardiac testing prior to procedure.

## 2022-09-06 NOTE — PROGRESS NOTE ADULT - ATTENDING COMMENTS
73yo M w/ extensive PMHx including DLBCL, G6PD deficiency, complete heart block s/p PPM, HFrEF (new EF 20%), CKD 3b, paroxysmal atrial fibrillation, treated for TLS syndrome w/ rasburicase, complicated by acute hemolytic anemia in setting of G6PD deficiency. Patient also p/w acute hypoxic respiratory failure due to heart failure exacerbation requiring bipap initially. He was eventually titrated off bipap and nasal cannula, now breathing comfortably on room air after initiation of diuretics.    MRI/MRA today noted- Limited examination as the patient was unable to cooperate. Old right parietal infarct. No acute infarcts. Normal intracranial circulation, limited MRA of the neck due to motion.    Plan for excisional biopsy tomorrow.

## 2022-09-06 NOTE — PROGRESS NOTE ADULT - SUBJECTIVE AND OBJECTIVE BOX
Neurology Progress Note    S: Patient seen and examined. No new events overnight. more confused at times     Medication:  MEDICATIONS  (STANDING):  acyclovir   Oral Tab/Cap 400 milliGRAM(s) Oral two times a day  allopurinol 100 milliGRAM(s) Oral daily  atorvastatin 40 milliGRAM(s) Oral at bedtime  chlorhexidine 2% Cloths 1 Application(s) Topical daily  chlorhexidine 4% Liquid 1 Application(s) Topical <User Schedule>  dextrose 5%. 1000 milliLiter(s) (100 mL/Hr) IV Continuous <Continuous>  dextrose 5%. 1000 milliLiter(s) (50 mL/Hr) IV Continuous <Continuous>  dextrose 50% Injectable 25 Gram(s) IV Push once  dextrose 50% Injectable 12.5 Gram(s) IV Push once  dextrose 50% Injectable 25 Gram(s) IV Push once  glucagon  Injectable 1 milliGRAM(s) IntraMuscular once  heparin  Infusion.  Unit(s)/Hr (14 mL/Hr) IV Continuous <Continuous>  insulin lispro (ADMELOG) corrective regimen sliding scale   SubCutaneous Before meals and at bedtime  metoprolol succinate ER 25 milliGRAM(s) Oral daily  multivitamin 1 Tablet(s) Oral daily  potassium chloride    Tablet ER 40 milliEquivalent(s) Oral every 4 hours  sacubitril 49 mG/valsartan 51 mG 1 Tablet(s) Oral every 12 hours    MEDICATIONS  (PRN):  dextrose Oral Gel 15 Gram(s) Oral once PRN Blood Glucose LESS THAN 70 milliGRAM(s)/deciliter  heparin   Injectable 6500 Unit(s) IV Push every 6 hours PRN For aPTT less than 40  heparin   Injectable 3000 Unit(s) IV Push every 6 hours PRN For aPTT between 40 - 57  melatonin 3 milliGRAM(s) Oral at bedtime PRN Insomnia  sodium chloride 0.9% lock flush 10 milliLiter(s) IV Push every 1 hour PRN Pre/post blood products, medications, blood draw, and to maintain line patency        Vitals:    Vital Signs Last 24 Hrs  T(C): 36.8 (22 @ 04:00), Max: 36.8 (22 @ 04:00)  T(F): 98.2 (22 @ 04:00), Max: 98.2 (22 @ 04:00)  HR: 86 (22 @ 04:00) (82 - 92)  BP: 128/68 (22 @ 04:00) (105/68 - 128/68)  BP(mean): --  RR: 18 (22 @ 04:00) (17 - 18)  SpO2: 95% (22 @ 04:00) (95% - 96%)            General Exam:   General Appearance: Appropriately dressed and in no acute distress       Head: Normocephalic, atraumatic and no dysmorphic features  Ear, Nose, and Throat: Moist mucous membranes  CVS: S1S2+  Resp: No SOB, no wheeze or rhonchi  Abd: soft NTND  Extremities: No edema, no cyanosis  Skin: No bruises, no rashes     Neurological Exam:  - Mental Status:  Alert, awake, oriented to person, not place, and time; Speech is fluent with intact naming.   - Cranial Nerves II-XII:    II:  Visual fields are full to confrontation; Pupils are equal, round, and reactive to light  III, IV, VI:  Extraocular movements are intact without nystagmus.  V:  Facial sensation is intact in the V1-V3 distribution bilaterally.  VII:  Face is symmetric with normal eye closure and smile  VIII:  Hearing is intact to finger rub.  IX, X:  Uvula is midline and soft palate rises symmetrically  XI:  Head turning and shoulder shrug are intact.  XII:  Tongue protudes in the midline.  - Motor:  Strength is 5/5 throughout.  There is no pronator drift.  Normal muscle bulk and tone throughout.  - Sensory:  Intact throughout to light touch.  - Coordination:  Finger-nose-finger and heel-knee-shin intact without dysmetria, but b/l intention tremor.   - Gait:   Due to fall risk, did not assess.       I personally reviewed the below data/images/labs:      CBC Full  -  ( 05 Sep 2022 06:06 )  WBC Count : 9.23 K/uL  RBC Count : 4.35 M/uL  Hemoglobin : 11.5 g/dL  Hematocrit : 38.1 %  Platelet Count - Automated : 193 K/uL  Mean Cell Volume : 87.6 fl  Mean Cell Hemoglobin : 26.4 pg  Mean Cell Hemoglobin Concentration : 30.2 gm/dL  Auto Neutrophil # : x  Auto Lymphocyte # : x  Auto Monocyte # : x  Auto Eosinophil # : x  Auto Basophil # : x  Auto Neutrophil % : x  Auto Lymphocyte % : x  Auto Monocyte % : x  Auto Eosinophil % : x  Auto Basophil % : x        142  |  103  |  51<H>  ----------------------------<  159<H>  3.1<L>   |  28  |  0.93    Ca    8.5      06 Sep 2022 07:29  Phos  2.4       Mg     1.8         TPro  6.6  /  Alb  3.2<L>  /  TBili  0.9  /  DBili  x   /  AST  24  /  ALT  24  /  AlkPhos  111          Urinalysis Basic - ( 01 Sep 2022 12:01 )    Color: Light Yellow / Appearance: Clear / S.011 / pH: x  Gluc: x / Ketone: Negative  / Bili: Negative / Urobili: Negative   Blood: x / Protein: Negative / Nitrite: Negative   Leuk Esterase: Negative / RBC: x / WBC x   Sq Epi: x / Non Sq Epi: x / Bacteria: x    Acute/subacute right-sided parietal lobe infarction   superimposed upon a chronic infarct. Findings appear larger in size when   compared with 2019.    No hemorrhagic transformation at this time.    Similar-appearing mild chronic white matter microvascular type changes   and chronic lacunar infarct within the right basal ganglia.

## 2022-09-06 NOTE — PROGRESS NOTE ADULT - PROBLEM SELECTOR PLAN 2
- h/o grade 3 follicular lymphoma, pt was pending surgical biopsy on 8/23, however in heme/onc outpt labs s/f TLS with uric acid 13.7, pt was started rasburicase on 8/23 and developed rasburicase-triggered G6PD hemolysis with hgb 5.7 on 8/26  - UA 6.4 from 13.7 s/p rasburicase on 8/23, now on allopurinol 100mg   - follow-up with heme:      - Started prednisone 100mg daily x5 (day 4/5) days with intent to start Obinutuzumab-COEP inpatient 9/1      - PICC placed (8/30) however patient removed it, PICC placed again (8/31) and ready for chemotherapy initiation      - trend TLS labs (CMP, Mg, Phos, LDH, Uric Acid) q12hrs as patient is high risk at 6am and 6pm daily      - s/p 1st session 9/1, pending 2nd session 9/2  - Surgery consulted for biopsy for definitive diagnosis      - tentatively planned for Wednesday under local anesthesia, neuro ok with biopsy - h/o grade 3 follicular lymphoma, pt was pending surgical biopsy on 8/23, however in heme/onc outpt labs s/f TLS with uric acid 13.7, pt was started rasburicase on 8/23 and developed rasburicase-triggered G6PD hemolysis with hgb 5.7 on 8/26  - UA 6.4 from 13.7 s/p rasburicase on 8/23, now on allopurinol 100mg   - follow-up with heme:      - Started prednisone 100mg daily x5 (day 4/5) days with intent to start Obinutuzumab-COEP inpatient 9/1      - PICC placed (8/30) however patient removed it, PICC placed again (8/31) and ready for chemotherapy initiation      - trend TLS labs (CMP, Mg, Phos, LDH, Uric Acid) q12hrs as patient is high risk at 6am and 6pm daily      - s/p 1st session 9/1, pending 2nd session 9/2  - Surgery consulted for biopsy for definitive diagnosis      - tentatively planned for Wednesday under local anesthesia, neuro ok with biopsy  - Patient medically optimized for excisional biopsy under local anesthesia with general surgery on 9/7/22.

## 2022-09-06 NOTE — PROGRESS NOTE ADULT - PROBLEM SELECTOR PLAN 2
Unclear etiology, chemotherapy related vs direct toxicity vs. heart failure exacerbation. Troponins peaked.  -If no c/i after biopsy would start ASA

## 2022-09-07 ENCOUNTER — APPOINTMENT (OUTPATIENT)
Dept: SURGICAL ONCOLOGY | Facility: HOSPITAL | Age: 72
End: 2022-09-07

## 2022-09-07 ENCOUNTER — RESULT REVIEW (OUTPATIENT)
Age: 72
End: 2022-09-07

## 2022-09-07 LAB
ALBUMIN SERPL ELPH-MCNC: 3.4 G/DL — SIGNIFICANT CHANGE UP (ref 3.3–5)
ALP SERPL-CCNC: 130 U/L — HIGH (ref 40–120)
ALT FLD-CCNC: 29 U/L — SIGNIFICANT CHANGE UP (ref 10–45)
ANION GAP SERPL CALC-SCNC: 11 MMOL/L — SIGNIFICANT CHANGE UP (ref 5–17)
APTT BLD: 29.6 SEC — SIGNIFICANT CHANGE UP (ref 27.5–35.5)
AST SERPL-CCNC: 31 U/L — SIGNIFICANT CHANGE UP (ref 10–40)
BILIRUB SERPL-MCNC: 0.9 MG/DL — SIGNIFICANT CHANGE UP (ref 0.2–1.2)
BLD GP AB SCN SERPL QL: NEGATIVE — SIGNIFICANT CHANGE UP
BUN SERPL-MCNC: 44 MG/DL — HIGH (ref 7–23)
CALCIUM SERPL-MCNC: 9 MG/DL — SIGNIFICANT CHANGE UP (ref 8.4–10.5)
CHLORIDE SERPL-SCNC: 106 MMOL/L — SIGNIFICANT CHANGE UP (ref 96–108)
CO2 SERPL-SCNC: 25 MMOL/L — SIGNIFICANT CHANGE UP (ref 22–31)
CREAT SERPL-MCNC: 0.92 MG/DL — SIGNIFICANT CHANGE UP (ref 0.5–1.3)
EGFR: 89 ML/MIN/1.73M2 — SIGNIFICANT CHANGE UP
GLUCOSE BLDC GLUCOMTR-MCNC: 106 MG/DL — HIGH (ref 70–99)
GLUCOSE BLDC GLUCOMTR-MCNC: 106 MG/DL — HIGH (ref 70–99)
GLUCOSE BLDC GLUCOMTR-MCNC: 111 MG/DL — HIGH (ref 70–99)
GLUCOSE BLDC GLUCOMTR-MCNC: 139 MG/DL — HIGH (ref 70–99)
GLUCOSE BLDC GLUCOMTR-MCNC: 191 MG/DL — HIGH (ref 70–99)
GLUCOSE SERPL-MCNC: 118 MG/DL — HIGH (ref 70–99)
HCT VFR BLD CALC: 34.4 % — LOW (ref 39–50)
HGB BLD-MCNC: 10.3 G/DL — LOW (ref 13–17)
INR BLD: 1.19 RATIO — HIGH (ref 0.88–1.16)
LDH SERPL L TO P-CCNC: 269 U/L — HIGH (ref 50–242)
MAGNESIUM SERPL-MCNC: 1.9 MG/DL — SIGNIFICANT CHANGE UP (ref 1.6–2.6)
MCHC RBC-ENTMCNC: 25.9 PG — LOW (ref 27–34)
MCHC RBC-ENTMCNC: 29.9 GM/DL — LOW (ref 32–36)
MCV RBC AUTO: 86.4 FL — SIGNIFICANT CHANGE UP (ref 80–100)
NRBC # BLD: 0 /100 WBCS — SIGNIFICANT CHANGE UP (ref 0–0)
PHOSPHATE SERPL-MCNC: 2.3 MG/DL — LOW (ref 2.5–4.5)
PLATELET # BLD AUTO: 149 K/UL — LOW (ref 150–400)
POTASSIUM SERPL-MCNC: 3.8 MMOL/L — SIGNIFICANT CHANGE UP (ref 3.5–5.3)
POTASSIUM SERPL-SCNC: 3.8 MMOL/L — SIGNIFICANT CHANGE UP (ref 3.5–5.3)
PROT SERPL-MCNC: 6.8 G/DL — SIGNIFICANT CHANGE UP (ref 6–8.3)
PROTHROM AB SERPL-ACNC: 13.7 SEC — HIGH (ref 10.5–13.4)
RBC # BLD: 3.98 M/UL — LOW (ref 4.2–5.8)
RBC # FLD: 18.9 % — HIGH (ref 10.3–14.5)
RH IG SCN BLD-IMP: POSITIVE — SIGNIFICANT CHANGE UP
SODIUM SERPL-SCNC: 142 MMOL/L — SIGNIFICANT CHANGE UP (ref 135–145)
URATE SERPL-MCNC: 7.6 MG/DL — SIGNIFICANT CHANGE UP (ref 3.4–8.8)
WBC # BLD: 6.42 K/UL — SIGNIFICANT CHANGE UP (ref 3.8–10.5)
WBC # FLD AUTO: 6.42 K/UL — SIGNIFICANT CHANGE UP (ref 3.8–10.5)

## 2022-09-07 PROCEDURE — 88360 TUMOR IMMUNOHISTOCHEM/MANUAL: CPT | Mod: 26

## 2022-09-07 PROCEDURE — 21935 RESECT BACK TUM < 5 CM: CPT

## 2022-09-07 PROCEDURE — 99233 SBSQ HOSP IP/OBS HIGH 50: CPT | Mod: GC

## 2022-09-07 PROCEDURE — 88365 INSITU HYBRIDIZATION (FISH): CPT | Mod: 26,59

## 2022-09-07 PROCEDURE — 88342 IMHCHEM/IMCYTCHM 1ST ANTB: CPT | Mod: 26,59

## 2022-09-07 PROCEDURE — 88341 IMHCHEM/IMCYTCHM EA ADD ANTB: CPT | Mod: 26,59

## 2022-09-07 PROCEDURE — 99232 SBSQ HOSP IP/OBS MODERATE 35: CPT | Mod: GC

## 2022-09-07 PROCEDURE — 88364 INSITU HYBRIDIZATION (FISH): CPT | Mod: 26

## 2022-09-07 PROCEDURE — 88331 PATH CONSLTJ SURG 1 BLK 1SPC: CPT | Mod: 26

## 2022-09-07 DEVICE — ARISTA 1GR: Type: IMPLANTABLE DEVICE | Status: FUNCTIONAL

## 2022-09-07 RX ORDER — SODIUM CHLORIDE 9 MG/ML
1000 INJECTION, SOLUTION INTRAVENOUS
Refills: 0 | Status: DISCONTINUED | OUTPATIENT
Start: 2022-09-07 | End: 2022-09-12

## 2022-09-07 RX ORDER — TRAMADOL HYDROCHLORIDE 50 MG/1
25 TABLET ORAL EVERY 4 HOURS
Refills: 0 | Status: DISCONTINUED | OUTPATIENT
Start: 2022-09-07 | End: 2022-09-12

## 2022-09-07 RX ORDER — POTASSIUM PHOSPHATE, MONOBASIC POTASSIUM PHOSPHATE, DIBASIC 236; 224 MG/ML; MG/ML
15 INJECTION, SOLUTION INTRAVENOUS ONCE
Refills: 0 | Status: COMPLETED | OUTPATIENT
Start: 2022-09-07 | End: 2022-09-07

## 2022-09-07 RX ORDER — METOPROLOL TARTRATE 50 MG
50 TABLET ORAL DAILY
Refills: 0 | Status: DISCONTINUED | OUTPATIENT
Start: 2022-09-08 | End: 2022-09-08

## 2022-09-07 RX ORDER — DEXTROSE 50 % IN WATER 50 %
25 SYRINGE (ML) INTRAVENOUS ONCE
Refills: 0 | Status: DISCONTINUED | OUTPATIENT
Start: 2022-09-07 | End: 2022-09-12

## 2022-09-07 RX ORDER — GLUCAGON INJECTION, SOLUTION 0.5 MG/.1ML
1 INJECTION, SOLUTION SUBCUTANEOUS ONCE
Refills: 0 | Status: DISCONTINUED | OUTPATIENT
Start: 2022-09-07 | End: 2022-09-12

## 2022-09-07 RX ORDER — CHLORHEXIDINE GLUCONATE 213 G/1000ML
1 SOLUTION TOPICAL
Refills: 0 | Status: DISCONTINUED | OUTPATIENT
Start: 2022-09-07 | End: 2022-09-12

## 2022-09-07 RX ORDER — DEXTROSE 50 % IN WATER 50 %
15 SYRINGE (ML) INTRAVENOUS ONCE
Refills: 0 | Status: DISCONTINUED | OUTPATIENT
Start: 2022-09-07 | End: 2022-09-12

## 2022-09-07 RX ORDER — CHLORHEXIDINE GLUCONATE 213 G/1000ML
1 SOLUTION TOPICAL DAILY
Refills: 0 | Status: DISCONTINUED | OUTPATIENT
Start: 2022-09-07 | End: 2022-09-12

## 2022-09-07 RX ORDER — INSULIN LISPRO 100/ML
VIAL (ML) SUBCUTANEOUS
Refills: 0 | Status: DISCONTINUED | OUTPATIENT
Start: 2022-09-07 | End: 2022-09-12

## 2022-09-07 RX ORDER — ATORVASTATIN CALCIUM 80 MG/1
40 TABLET, FILM COATED ORAL AT BEDTIME
Refills: 0 | Status: DISCONTINUED | OUTPATIENT
Start: 2022-09-07 | End: 2022-09-12

## 2022-09-07 RX ORDER — METOPROLOL TARTRATE 50 MG
25 TABLET ORAL DAILY
Refills: 0 | Status: DISCONTINUED | OUTPATIENT
Start: 2022-09-07 | End: 2022-09-07

## 2022-09-07 RX ORDER — SACUBITRIL AND VALSARTAN 24; 26 MG/1; MG/1
1 TABLET, FILM COATED ORAL EVERY 12 HOURS
Refills: 0 | Status: DISCONTINUED | OUTPATIENT
Start: 2022-09-07 | End: 2022-09-12

## 2022-09-07 RX ORDER — HYDROMORPHONE HYDROCHLORIDE 2 MG/ML
0.25 INJECTION INTRAMUSCULAR; INTRAVENOUS; SUBCUTANEOUS
Refills: 0 | Status: DISCONTINUED | OUTPATIENT
Start: 2022-09-07 | End: 2022-09-07

## 2022-09-07 RX ORDER — LANOLIN ALCOHOL/MO/W.PET/CERES
3 CREAM (GRAM) TOPICAL AT BEDTIME
Refills: 0 | Status: DISCONTINUED | OUTPATIENT
Start: 2022-09-07 | End: 2022-09-12

## 2022-09-07 RX ORDER — ACYCLOVIR SODIUM 500 MG
400 VIAL (EA) INTRAVENOUS
Refills: 0 | Status: DISCONTINUED | OUTPATIENT
Start: 2022-09-07 | End: 2022-09-12

## 2022-09-07 RX ORDER — ACETAMINOPHEN 500 MG
975 TABLET ORAL EVERY 6 HOURS
Refills: 0 | Status: DISCONTINUED | OUTPATIENT
Start: 2022-09-07 | End: 2022-09-08

## 2022-09-07 RX ORDER — ALLOPURINOL 300 MG
100 TABLET ORAL DAILY
Refills: 0 | Status: DISCONTINUED | OUTPATIENT
Start: 2022-09-07 | End: 2022-09-12

## 2022-09-07 RX ORDER — DEXTROSE 50 % IN WATER 50 %
12.5 SYRINGE (ML) INTRAVENOUS ONCE
Refills: 0 | Status: DISCONTINUED | OUTPATIENT
Start: 2022-09-07 | End: 2022-09-12

## 2022-09-07 RX ORDER — ONDANSETRON 8 MG/1
4 TABLET, FILM COATED ORAL ONCE
Refills: 0 | Status: DISCONTINUED | OUTPATIENT
Start: 2022-09-07 | End: 2022-09-07

## 2022-09-07 RX ORDER — TRAMADOL HYDROCHLORIDE 50 MG/1
50 TABLET ORAL EVERY 4 HOURS
Refills: 0 | Status: DISCONTINUED | OUTPATIENT
Start: 2022-09-07 | End: 2022-09-12

## 2022-09-07 RX ORDER — SODIUM CHLORIDE 9 MG/ML
10 INJECTION INTRAMUSCULAR; INTRAVENOUS; SUBCUTANEOUS
Refills: 0 | Status: DISCONTINUED | OUTPATIENT
Start: 2022-09-07 | End: 2022-09-12

## 2022-09-07 RX ORDER — SODIUM,POTASSIUM PHOSPHATES 278-250MG
2 POWDER IN PACKET (EA) ORAL ONCE
Refills: 0 | Status: DISCONTINUED | OUTPATIENT
Start: 2022-09-07 | End: 2022-09-07

## 2022-09-07 RX ADMIN — Medication 1: at 21:19

## 2022-09-07 RX ADMIN — CHLORHEXIDINE GLUCONATE 1 APPLICATION(S): 213 SOLUTION TOPICAL at 18:35

## 2022-09-07 RX ADMIN — Medication 975 MILLIGRAM(S): at 18:35

## 2022-09-07 RX ADMIN — Medication 400 MILLIGRAM(S): at 17:57

## 2022-09-07 RX ADMIN — Medication 25 MILLIGRAM(S): at 05:01

## 2022-09-07 RX ADMIN — POTASSIUM PHOSPHATE, MONOBASIC POTASSIUM PHOSPHATE, DIBASIC 62.5 MILLIMOLE(S): 236; 224 INJECTION, SOLUTION INTRAVENOUS at 08:46

## 2022-09-07 RX ADMIN — ATORVASTATIN CALCIUM 40 MILLIGRAM(S): 80 TABLET, FILM COATED ORAL at 21:18

## 2022-09-07 RX ADMIN — Medication 400 MILLIGRAM(S): at 05:01

## 2022-09-07 RX ADMIN — SACUBITRIL AND VALSARTAN 1 TABLET(S): 24; 26 TABLET, FILM COATED ORAL at 05:00

## 2022-09-07 RX ADMIN — SACUBITRIL AND VALSARTAN 1 TABLET(S): 24; 26 TABLET, FILM COATED ORAL at 18:36

## 2022-09-07 RX ADMIN — Medication 3 MILLIGRAM(S): at 21:18

## 2022-09-07 RX ADMIN — Medication 100 MILLIGRAM(S): at 18:35

## 2022-09-07 RX ADMIN — Medication 1 TABLET(S): at 17:58

## 2022-09-07 RX ADMIN — CHLORHEXIDINE GLUCONATE 1 APPLICATION(S): 213 SOLUTION TOPICAL at 05:01

## 2022-09-07 NOTE — PROGRESS NOTE ADULT - SUBJECTIVE AND OBJECTIVE BOX
Surgery Progress Note    INTERVAl/SUBJECTIVE: No acute event overnight. Patient seen and examined in am rounds, found to be without acute distress.      Vital Signs Last 24 Hrs  T(C): 36.9 (07 Sep 2022 04:57), Max: 37.2 (06 Sep 2022 14:28)  T(F): 98.5 (07 Sep 2022 04:57), Max: 98.9 (06 Sep 2022 14:28)  HR: 83 (07 Sep 2022 04:57) (83 - 101)  BP: 129/67 (07 Sep 2022 04:57) (96/59 - 144/77)  BP(mean): --  RR: 18 (07 Sep 2022 04:57) (17 - 18)  SpO2: 98% (07 Sep 2022 04:57) (94% - 99%)    Parameters below as of 07 Sep 2022 04:57  Patient On (Oxygen Delivery Method): room air        Physical Exam:  General: Appears well, NAD  CHEST: breathing comfortably  CV: appears well perfused  Abdomen: soft, nontender, nondistended, no rebound or guarding  Back mass: similar size from admission , skin intact  Extremities: Grossly symmetric    LABS:                        10.3   6.42  )-----------( 149      ( 07 Sep 2022 05:57 )             34.4     09-07    142  |  106  |  44<H>  ----------------------------<  118<H>  3.8   |  25  |  0.92    Ca    9.0      07 Sep 2022 05:57  Phos  2.3     09-07  Mg     1.9     09-07    TPro  6.8  /  Alb  3.4  /  TBili  0.9  /  DBili  x   /  AST  31  /  ALT  29  /  AlkPhos  130<H>  09-07    PT/INR - ( 07 Sep 2022 05:57 )   PT: 13.7 sec;   INR: 1.19 ratio         PTT - ( 07 Sep 2022 05:57 )  PTT:29.6 sec      INs and OUTs:    09-06-22 @ 07:01  -  09-07-22 @ 07:00  --------------------------------------------------------  IN: 440 mL / OUT: 1400 mL / NET: -960 mL     Surgery Progress Note    INTERVAl/SUBJECTIVE: No acute event overnight. Patient seen and examined in am rounds, found to be without acute distress.      Vital Signs Last 24 Hrs  T(C): 36.9 (07 Sep 2022 04:57), Max: 37.2 (06 Sep 2022 14:28)  T(F): 98.5 (07 Sep 2022 04:57), Max: 98.9 (06 Sep 2022 14:28)  HR: 83 (07 Sep 2022 04:57) (83 - 101)  BP: 129/67 (07 Sep 2022 04:57) (96/59 - 144/77)  BP(mean): --  RR: 18 (07 Sep 2022 04:57) (17 - 18)  SpO2: 98% (07 Sep 2022 04:57) (94% - 99%)    Parameters below as of 07 Sep 2022 04:57  Patient On (Oxygen Delivery Method): room air        Physical Exam:  General: Appears well, NAD, A&O*1-2  CHEST: breathing comfortably  CV: appears well perfused  Abdomen: soft, nontender, nondistended, no rebound or guarding  Back mass: similar size from admission , skin intact  Extremities: Grossly symmetric    LABS:                        10.3   6.42  )-----------( 149      ( 07 Sep 2022 05:57 )             34.4     09-07    142  |  106  |  44<H>  ----------------------------<  118<H>  3.8   |  25  |  0.92    Ca    9.0      07 Sep 2022 05:57  Phos  2.3     09-07  Mg     1.9     09-07    TPro  6.8  /  Alb  3.4  /  TBili  0.9  /  DBili  x   /  AST  31  /  ALT  29  /  AlkPhos  130<H>  09-07    PT/INR - ( 07 Sep 2022 05:57 )   PT: 13.7 sec;   INR: 1.19 ratio         PTT - ( 07 Sep 2022 05:57 )  PTT:29.6 sec      INs and OUTs:    09-06-22 @ 07:01  -  09-07-22 @ 07:00  --------------------------------------------------------  IN: 440 mL / OUT: 1400 mL / NET: -960 mL

## 2022-09-07 NOTE — PROGRESS NOTE ADULT - PROBLEM SELECTOR PLAN 9
DIET: Dash/TLC  DVT Prophylaxis: Heparin gtt  Dispo: Outpatient PT DIET: Dash/TLC  DVT Prophylaxis: Holding iso recent surgery will restart upon reevaluation by surgery in AM.  Dispo: Outpatient PT

## 2022-09-07 NOTE — PROGRESS NOTE ADULT - PROBLEM SELECTOR PLAN 3
heme/onc      - s/p 1 session chemotherapy: TLS labs significant for uric acid 8.2, phosphorus 5.6      - s/p 2 sessions chemotherapy: TLS labs significant for uric acid 8.3, phosphorus 5.4 s/p sevelamer       - s/p 3 sessions chemotherapy: TLS labs significant for uric acid 9.1, phosphorus 2.9      - most recent G6PD level 11.1 (wnl) following multiple transfusions of pRBC      - patient is at high risk for TLS but still requires chemotherapy iso lymphoma, comfortable to give rasburicase if needed with improved G6PD levels --> as per heme/onc if uric acid > 10, contact fellow first prior to 3mg rasburicase      - 9/4 AM with uric acid 9.4, phos 1.6, will d/c sevelamer   - on allopurinol 100mg  - uric acid still under threshold set by heme/onc will c/w monitor for now, if uptrending will reach out for further recs, no more scheduled sessions of chemotherapy heme/onc      - s/p 1 session chemotherapy: TLS labs significant for uric acid 8.2, phosphorus 5.6      - s/p 2 sessions chemotherapy: TLS labs significant for uric acid 8.3, phosphorus 5.4 s/p sevelamer       - s/p 3 sessions chemotherapy: TLS labs significant for uric acid 9.1, phosphorus 2.9      - most recent G6PD level 11.1 (wnl) following multiple transfusions of pRBC      - patient is at high risk for TLS but still requires chemotherapy iso lymphoma, comfortable to give rasburicase if needed with improved G6PD levels --> as per heme/onc if uric acid > 10, contact fellow first prior to 3mg rasburicase      - 9/4 AM with uric acid 9.4, phos 1.6, will d/c sevelamer   - on allopurinol 100mg  - uric acid still under threshold set by heme/onc will c/w monitor for now, if uptrending will reach out for further recs,  - obinutuzumab on 9/9 can be done outpatient or inpatient depending on disposition.

## 2022-09-07 NOTE — PROGRESS NOTE ADULT - ASSESSMENT
72M hx DLBCL (tx with R-CHOP in 2003, with relapse in 2009 treated with BR) now with multiple areas of palpable lymphadenopathy with biopsies shown to be at least grade IIIA follicular lymphoma. Has had significant weight loss >20lbs in the last 6 months. Also noted to have an IgG lambda, IgG Kappa, and IgM Lambda monoclonal gammopathies. Prior to being sent to the ED his labs were suggestive of TLS with UA 13.7 s/p 3mg rasburicase on 8/23.      #Follicular Lymphoma  #Hemolytic Anemia  -Follows with Dr. Cordova at [facility].   -Originally diagnosed in 2003 s/p RCHOP with relapse in 2009 s/p BR. Recent outpatient PET/CT 8/2022 showed concern for POD with new LAD and subcutaneous tissue nodules  ---s/p FNA L cervical LN in June 2022 with path c/w grade 3A follicular lymphoma positive for BCL6 (3q27) breakpoint translocation and negative for MYC Rearrangement or BCL2-IGH gene rearrangement [translocation t(14;18) present in ~ 85% of [place]].   ---Planned for excisional biopsy outpatient to confirm suspected 3B FL vs. transformation, but unable to be completed due to current admission. Patient presented with spontaneous TLS, now requiring inpatient treatment. Ideally, we would require an excisional biopsy to confirm the suspected diagnosis. However, patient is not optimized medically for an excisional biopsy inpatient, so we will treat for a presumptive diagnosis of relapsed FL with regimen below so as not to delay treatment:  -Patient is on treatment with a modified regimen of mini-COEP plus Obinutuzumab. C1 Started on 9/1/22 (cycle length q21 days).   ------Cyclophosphamide 400 mg/m² on D1 (9/1)  ------Etoposide 40% dose reduced to 30 mg/m2 IV on D1-D3 of each cycle (9/1-9/3)  ------Vincristine 2mg (flat dose) on D1 (9/1)  ------Prednisone 100mg PO daily x5 days (started on 8/29-9/2)  ------Obinutuzumab 100mg test dose on D1 + 900mg full dose on D2 (9/1, (9/2)  -Excisional biopsy should be completed as soon as possible once patient is medically cleared, but as above, we have proceeded with treatment.  -We will plan to treat with weekly Obinutuzumab for C1. Patient will receive Obinutuzumab 1000mg on 9/9 and 9/16     #G6PD Deficiency  #Concern for TLS  -Patient is G6PD Deficient (G6PD 4.8) and is very high risk for TLS in the s/o spontaneous TLS on admission. He may require further doses of Rasburicase which can cause hemolytic anemia in the setting of G6PD Deficiency  -He is s/p rasburicase on 8/23 outpatient with development of hemolytic anemia and is now s/p 4u pRBC on this admission--repeat G6PD on 8/27 following transfusions now 11.1  -As we are initiating treatment today, we are anticipating patient may develop TLS and we may have to initiate HD as we cannot safely administer rasburicase  -Please check TLS labs BID. Please ensure to check CMP, Mg, Phos, LDH, Uric acid.  ---If Uric Acid is >10, please give 3mg Rasburicase. If Rasburicase is given, please check CBC BID as patient is high risk for hemolysis. Please contact the Hematology fellow on call prior to administration of Rasburicase. Please call the Hematology fellow on call if there are any concerning findings on the TLS labs.  -Nephrology consulted. Appreciate recommendations    Brittaney Ambrosio MD  Hematology/Oncology Fellow, PGY-6  Pager: [phone]  After 5pm and on weekends please page on-call fellow   72M hx DLBCL (tx with R-CHOP in 2003, with relapse in 2009 treated with BR) now with multiple areas of palpable lymphadenopathy with biopsies shown to be at least grade IIIA follicular lymphoma. Has had significant weight loss >20lbs in the last 6 months. Also noted to have an IgG lambda, IgG Kappa, and IgM Lambda monoclonal gammopathies. Prior to being sent to the ED his labs were suggestive of TLS with UA 13.7 s/p 3mg rasburicase on 8/23.      #Follicular Lymphoma  #Hemolytic Anemia  -Follows with Dr. Cordova at [facility].   -Originally diagnosed in 2003 s/p RCHOP with relapse in 2009 s/p BR. Recent outpatient PET/CT 8/2022 showed concern for POD with new LAD and subcutaneous tissue nodules  ---s/p FNA L cervical LN in June 2022 with path c/w grade 3A follicular lymphoma positive for BCL6 (3q27) breakpoint translocation and negative for MYC Rearrangement or BCL2-IGH gene rearrangement [translocation t(14;18) present in ~ 85% of FL].   ---Planned for excisional biopsy outpatient to confirm suspected 3B FL vs. transformation, but unable to be completed due to current admission. Patient presented with spontaneous TLS, now requiring inpatient treatment. Ideally, we would require an excisional biopsy to confirm the suspected diagnosis. However, patient was not optimized medically for an excisional biopsy inpatient prior to treatment, so we treated for a presumptive diagnosis of relapsed FL with regimen below so as not to delay treatment:  -Patient is on treatment with a modified regimen of mini-COEP plus Obinutuzumab. C1 Started on 9/1/22 (cycle length q21 days).   ------Cyclophosphamide 400 mg/m² on D1 (9/1)  ------Etoposide 40% dose reduced to 30 mg/m2 IV on D1-D3 of each cycle (9/1-9/3)  ------Vincristine 2mg (flat dose) on D1 (9/1)  ------Prednisone 100mg PO daily x5 days (started on 8/29-9/2)  ------Obinutuzumab 100mg test dose on D1 + 900mg full dose on D2 (9/1, (9/2)  -s/p excisional biopsy of back lesion on 9/7  -We will plan to treat with weekly Obinutuzumab for C1. Patient will receive Obinutuzumab 1000mg on 9/9 and 9/16     #G6PD Deficiency  #Concern for TLS  -Patient is G6PD Deficient (G6PD 4.8) and is very high risk for TLS in the s/o spontaneous TLS on admission. He may require further doses of Rasburicase which can cause hemolytic anemia in the setting of G6PD Deficiency  -He is s/p rasburicase on 8/23 outpatient with development of hemolytic anemia and is now s/p 4u pRBC on this admission--repeat G6PD on 8/27 following transfusions now 11.1  -As we are initiating treatment today, we are anticipating patient may develop TLS and we may have to initiate HD as we cannot safely administer rasburicase  -Please check TLS labs BID. Please ensure to check CMP, Mg, Phos, LDH, Uric acid.  ---If Uric Acid is >10, please give 3mg Rasburicase. If Rasburicase is given, please check CBC BID as patient is high risk for hemolysis. Please contact the Hematology fellow on call prior to administration of Rasburicase. Please call the Hematology fellow on call if there are any concerning findings on the TLS labs.  -Nephrology consulted. Appreciate recommendations    Brittaney Ambrosio MD  Hematology/Oncology Fellow, PGY-6  Pager: [phone]  After 5pm and on weekends please page on-call fellow

## 2022-09-07 NOTE — PROGRESS NOTE ADULT - SUBJECTIVE AND OBJECTIVE BOX
Interval History:    Medications:  acyclovir   Oral Tab/Cap 400 milliGRAM(s) Oral two times a day  allopurinol 100 milliGRAM(s) Oral daily  atorvastatin 40 milliGRAM(s) Oral at bedtime  chlorhexidine 2% Cloths 1 Application(s) Topical daily  chlorhexidine 4% Liquid 1 Application(s) Topical <User Schedule>  dextrose 5%. 1000 milliLiter(s) IV Continuous <Continuous>  dextrose 5%. 1000 milliLiter(s) IV Continuous <Continuous>  dextrose 50% Injectable 25 Gram(s) IV Push once  dextrose 50% Injectable 12.5 Gram(s) IV Push once  dextrose 50% Injectable 25 Gram(s) IV Push once  dextrose Oral Gel 15 Gram(s) Oral once PRN  glucagon  Injectable 1 milliGRAM(s) IntraMuscular once  insulin lispro (ADMELOG) corrective regimen sliding scale   SubCutaneous Before meals and at bedtime  melatonin 3 milliGRAM(s) Oral at bedtime PRN  metoprolol succinate ER 25 milliGRAM(s) Oral daily  multivitamin 1 Tablet(s) Oral daily  potassium phosphate IVPB 15 milliMole(s) IV Intermittent once  sacubitril 49 mG/valsartan 51 mG 1 Tablet(s) Oral every 12 hours  sodium chloride 0.9% lock flush 10 milliLiter(s) IV Push every 1 hour PRN      Vitals:  T(C): 36.9 (09-07-22 @ 04:57), Max: 37.2 (09-06-22 @ 14:28)  HR: 83 (09-07-22 @ 04:57) (83 - 101)  BP: 129/67 (09-07-22 @ 04:57) (96/59 - 144/77)  BP(mean): --  RR: 18 (09-07-22 @ 04:57) (17 - 18)  SpO2: 98% (09-07-22 @ 04:57) (94% - 99%)    Daily     Daily         I&O's Summary    06 Sep 2022 07:01  -  07 Sep 2022 07:00  --------------------------------------------------------  IN: 440 mL / OUT: 1400 mL / NET: -960 mL        Physical Exam:      Labs:                        10.3   6.42  )-----------( 149      ( 07 Sep 2022 05:57 )             34.4     09-07    142  |  106  |  44<H>  ----------------------------<  118<H>  3.8   |  25  |  0.92    Ca    9.0      07 Sep 2022 05:57  Phos  2.3     09-07  Mg     1.9     09-07    TPro  6.8  /  Alb  3.4  /  TBili  0.9  /  DBili  x   /  AST  31  /  ALT  29  /  AlkPhos  130<H>  09-07    PT/INR - ( 07 Sep 2022 05:57 )   PT: 13.7 sec;   INR: 1.19 ratio         PTT - ( 07 Sep 2022 05:57 )  PTT:29.6 sec

## 2022-09-07 NOTE — PRE-OP CHECKLIST - 2.
Patient alert and oriented x1-2. Daughter Vonnie giving consent over the telephone for surgical and anesthesia consents with patient at bedside.

## 2022-09-07 NOTE — PROGRESS NOTE ADULT - ATTENDING COMMENTS
71yo M w/ extensive PMHx including DLBCL, G6PD deficiency, complete heart block s/p PPM, HFrEF (new EF 20%), CKD 3b, paroxysmal atrial fibrillation, treated for TLS syndrome w/ rasburicase, complicated by acute hemolytic anemia in setting of G6PD deficiency. Patient also p/w acute hypoxic respiratory failure due to heart failure exacerbation requiring bipap initially. He was eventually titrated off bipap and nasal cannula, now breathing comfortably on room air after diuresis.    S/p excisional biopsy of back lesion today. Continue postop pain control.     Monitor thrombocytopenia.

## 2022-09-07 NOTE — PROGRESS NOTE ADULT - ATTENDING COMMENTS
The patient is now in process of surgical resection of subcutaneous mass. he has tolerated prior chemotherapy treatment last week

## 2022-09-07 NOTE — PROGRESS NOTE ADULT - SUBJECTIVE AND OBJECTIVE BOX
INTERVAL HPI/OVERNIGHT EVENTS:  Patient S&E at bedside. No o/n events,     VITAL SIGNS:  T(F): 98.5 (09-07-22 @ 04:57)  HR: 83 (09-07-22 @ 04:57)  BP: 129/67 (09-07-22 @ 04:57)  RR: 18 (09-07-22 @ 04:57)  SpO2: 98% (09-07-22 @ 04:57)  Wt(kg): --    PHYSICAL EXAM:    Constitutional: NAD  Eyes: EOMI, sclera non-icteric  Neck: supple  Respiratory: CTAB, no wheezes or crackles   Cardiovascular: RRR  Gastrointestinal: soft, NTND, + BS  Extremities: no cyanosis, clubbing or edema   Neurological: awake and alert      MEDICATIONS  (STANDING):  acyclovir   Oral Tab/Cap 400 milliGRAM(s) Oral two times a day  allopurinol 100 milliGRAM(s) Oral daily  atorvastatin 40 milliGRAM(s) Oral at bedtime  chlorhexidine 2% Cloths 1 Application(s) Topical daily  chlorhexidine 4% Liquid 1 Application(s) Topical <User Schedule>  dextrose 5%. 1000 milliLiter(s) (100 mL/Hr) IV Continuous <Continuous>  dextrose 5%. 1000 milliLiter(s) (50 mL/Hr) IV Continuous <Continuous>  dextrose 50% Injectable 25 Gram(s) IV Push once  dextrose 50% Injectable 12.5 Gram(s) IV Push once  dextrose 50% Injectable 25 Gram(s) IV Push once  glucagon  Injectable 1 milliGRAM(s) IntraMuscular once  insulin lispro (ADMELOG) corrective regimen sliding scale   SubCutaneous Before meals and at bedtime  metoprolol succinate ER 25 milliGRAM(s) Oral daily  multivitamin 1 Tablet(s) Oral daily  sacubitril 49 mG/valsartan 51 mG 1 Tablet(s) Oral every 12 hours    MEDICATIONS  (PRN):  dextrose Oral Gel 15 Gram(s) Oral once PRN Blood Glucose LESS THAN 70 milliGRAM(s)/deciliter  melatonin 3 milliGRAM(s) Oral at bedtime PRN Insomnia  sodium chloride 0.9% lock flush 10 milliLiter(s) IV Push every 1 hour PRN Pre/post blood products, medications, blood draw, and to maintain line patency      Allergies    No Known Allergies    Intolerances        LABS:                        10.3   6.42  )-----------( 149      ( 07 Sep 2022 05:57 )             34.4     09-07    142  |  106  |  44<H>  ----------------------------<  118<H>  3.8   |  25  |  0.92    Ca    9.0      07 Sep 2022 05:57  Phos  2.3     09-07  Mg     1.9     09-07    TPro  6.8  /  Alb  3.4  /  TBili  0.9  /  DBili  x   /  AST  31  /  ALT  29  /  AlkPhos  130<H>  09-07    PT/INR - ( 07 Sep 2022 05:57 )   PT: 13.7 sec;   INR: 1.19 ratio         PTT - ( 07 Sep 2022 05:57 )  PTT:29.6 sec      RADIOLOGY & ADDITIONAL TESTS:  Studies reviewed.

## 2022-09-07 NOTE — PROGRESS NOTE ADULT - SUBJECTIVE AND OBJECTIVE BOX
PROGRESS NOTE:   Authored by Dave Vanegas MD   Patient is a 72y old  Male who presents with a chief complaint of follicular  B cell lymphoma relapse (05 Sep 2022 11:04)      SUBJECTIVE / OVERNIGHT EVENTS: No acute events overnight. No chest pain,  palpitations, dyspnea, abdominal pain. nausea, vomiting, diarrhea, blurry vision, dysuria, lightheadedness, dizziness.    ADDITIONAL REVIEW OF SYSTEMS:    MEDICATIONS  (STANDING):  acyclovir   Oral Tab/Cap 400 milliGRAM(s) Oral two times a day  allopurinol 100 milliGRAM(s) Oral daily  atorvastatin 40 milliGRAM(s) Oral at bedtime  chlorhexidine 2% Cloths 1 Application(s) Topical daily  chlorhexidine 4% Liquid 1 Application(s) Topical <User Schedule>  dextrose 5%. 1000 milliLiter(s) (100 mL/Hr) IV Continuous <Continuous>  dextrose 5%. 1000 milliLiter(s) (50 mL/Hr) IV Continuous <Continuous>  dextrose 50% Injectable 25 Gram(s) IV Push once  dextrose 50% Injectable 12.5 Gram(s) IV Push once  dextrose 50% Injectable 25 Gram(s) IV Push once  glucagon  Injectable 1 milliGRAM(s) IntraMuscular once  insulin lispro (ADMELOG) corrective regimen sliding scale   SubCutaneous Before meals and at bedtime  metoprolol succinate ER 25 milliGRAM(s) Oral daily  multivitamin 1 Tablet(s) Oral daily  potassium phosphate / sodium phosphate Powder (PHOS-NaK) 2 Packet(s) Oral once  sacubitril 49 mG/valsartan 51 mG 1 Tablet(s) Oral every 12 hours    MEDICATIONS  (PRN):  dextrose Oral Gel 15 Gram(s) Oral once PRN Blood Glucose LESS THAN 70 milliGRAM(s)/deciliter  melatonin 3 milliGRAM(s) Oral at bedtime PRN Insomnia  sodium chloride 0.9% lock flush 10 milliLiter(s) IV Push every 1 hour PRN Pre/post blood products, medications, blood draw, and to maintain line patency      CAPILLARY BLOOD GLUCOSE      POCT Blood Glucose.: 111 mg/dL (07 Sep 2022 06:30)  POCT Blood Glucose.: 139 mg/dL (07 Sep 2022 00:22)  POCT Blood Glucose.: 125 mg/dL (06 Sep 2022 21:10)  POCT Blood Glucose.: 180 mg/dL (06 Sep 2022 16:04)  POCT Blood Glucose.: 116 mg/dL (06 Sep 2022 11:30)  POCT Blood Glucose.: 110 mg/dL (06 Sep 2022 07:36)    I&O's Summary    06 Sep 2022 07:01  -  07 Sep 2022 07:00  --------------------------------------------------------  IN: 440 mL / OUT: 1400 mL / NET: -960 mL        PHYSICAL EXAM:  Vital Signs Last 24 Hrs  T(C): 36.9 (07 Sep 2022 04:57), Max: 37.2 (06 Sep 2022 14:28)  T(F): 98.5 (07 Sep 2022 04:57), Max: 98.9 (06 Sep 2022 14:28)  HR: 83 (07 Sep 2022 04:57) (83 - 101)  BP: 129/67 (07 Sep 2022 04:57) (96/59 - 144/77)  BP(mean): --  RR: 18 (07 Sep 2022 04:57) (17 - 18)  SpO2: 98% (07 Sep 2022 04:57) (94% - 99%)    Parameters below as of 07 Sep 2022 04:57  Patient On (Oxygen Delivery Method): room air        GENERAL: No apparent distress.   HEAD:  Atraumatic, Normocephalic  EYES: EOMI, PERRLA, conjunctiva and sclera clear  NECK: Supple, no lymphadenopathy, no JVD  CHEST/LUNG: Clear to auscultation bilateral and symmetric; No wheezes, rales, or rhonchi  HEART: S1 normal, loud S2. Regular rate and rhythm; No murmurs, rubs, or gallops  ABDOMEN: Soft, non-tender, non-distended; normal bowel sounds  EXTREMITIES:  2+ peripheral pulses b/l, No clubbing, cyanosis, or edema  NEUROLOGY: A&O x 2, no focal deficits  SKIN: No rashes or lesions. Mass upper back, midline.      LABS:                        10.3   6.42  )-----------( 149      ( 07 Sep 2022 05:57 )             34.4     09-07    142  |  106  |  44<H>  ----------------------------<  118<H>  3.8   |  25  |  0.92    Ca    9.0      07 Sep 2022 05:57  Phos  2.3     09-07  Mg     1.9     09-07    TPro  6.8  /  Alb  3.4  /  TBili  0.9  /  DBili  x   /  AST  31  /  ALT  29  /  AlkPhos  130<H>  09-07    PT/INR - ( 07 Sep 2022 05:57 )   PT: 13.7 sec;   INR: 1.19 ratio         PTT - ( 07 Sep 2022 05:57 )  PTT:29.6 sec            RADIOLOGY & ADDITIONAL TESTS:  Lab Results Reviewed   Imaging Reviewed  Electrocardiogram Reviewed   PROGRESS NOTE:   Authored by Dave Vanegas MD   Patient is a 72y old  Male who presents with a chief complaint of follicular  B cell lymphoma relapse (05 Sep 2022 11:04)      SUBJECTIVE / OVERNIGHT EVENTS: No acute events overnight. No chest pain,  palpitations, dyspnea, abdominal pain. nausea, vomiting, diarrhea, blurry vision, dysuria, lightheadedness, dizziness.    ADDITIONAL REVIEW OF SYSTEMS:    MEDICATIONS  (STANDING):  acyclovir   Oral Tab/Cap 400 milliGRAM(s) Oral two times a day  allopurinol 100 milliGRAM(s) Oral daily  atorvastatin 40 milliGRAM(s) Oral at bedtime  chlorhexidine 2% Cloths 1 Application(s) Topical daily  chlorhexidine 4% Liquid 1 Application(s) Topical <User Schedule>  dextrose 5%. 1000 milliLiter(s) (100 mL/Hr) IV Continuous <Continuous>  dextrose 5%. 1000 milliLiter(s) (50 mL/Hr) IV Continuous <Continuous>  dextrose 50% Injectable 25 Gram(s) IV Push once  dextrose 50% Injectable 12.5 Gram(s) IV Push once  dextrose 50% Injectable 25 Gram(s) IV Push once  glucagon  Injectable 1 milliGRAM(s) IntraMuscular once  insulin lispro (ADMELOG) corrective regimen sliding scale   SubCutaneous Before meals and at bedtime  metoprolol succinate ER 25 milliGRAM(s) Oral daily  multivitamin 1 Tablet(s) Oral daily  potassium phosphate / sodium phosphate Powder (PHOS-NaK) 2 Packet(s) Oral once  sacubitril 49 mG/valsartan 51 mG 1 Tablet(s) Oral every 12 hours    MEDICATIONS  (PRN):  dextrose Oral Gel 15 Gram(s) Oral once PRN Blood Glucose LESS THAN 70 milliGRAM(s)/deciliter  melatonin 3 milliGRAM(s) Oral at bedtime PRN Insomnia  sodium chloride 0.9% lock flush 10 milliLiter(s) IV Push every 1 hour PRN Pre/post blood products, medications, blood draw, and to maintain line patency      CAPILLARY BLOOD GLUCOSE      POCT Blood Glucose.: 111 mg/dL (07 Sep 2022 06:30)  POCT Blood Glucose.: 139 mg/dL (07 Sep 2022 00:22)  POCT Blood Glucose.: 125 mg/dL (06 Sep 2022 21:10)  POCT Blood Glucose.: 180 mg/dL (06 Sep 2022 16:04)  POCT Blood Glucose.: 116 mg/dL (06 Sep 2022 11:30)  POCT Blood Glucose.: 110 mg/dL (06 Sep 2022 07:36)    I&O's Summary    06 Sep 2022 07:01  -  07 Sep 2022 07:00  --------------------------------------------------------  IN: 440 mL / OUT: 1400 mL / NET: -960 mL        PHYSICAL EXAM:  Vital Signs Last 24 Hrs  T(C): 36.9 (07 Sep 2022 04:57), Max: 37.2 (06 Sep 2022 14:28)  T(F): 98.5 (07 Sep 2022 04:57), Max: 98.9 (06 Sep 2022 14:28)  HR: 83 (07 Sep 2022 04:57) (83 - 101)  BP: 129/67 (07 Sep 2022 04:57) (96/59 - 144/77)  BP(mean): --  RR: 18 (07 Sep 2022 04:57) (17 - 18)  SpO2: 98% (07 Sep 2022 04:57) (94% - 99%)    Parameters below as of 07 Sep 2022 04:57  Patient On (Oxygen Delivery Method): room air        GENERAL: No apparent distress.   HEAD:  Atraumatic, Normocephalic  EYES: EOMI, PERRLA, conjunctiva and sclera clear  NECK: Supple, no lymphadenopathy, no JVD  CHEST/LUNG: Clear to auscultation bilateral and symmetric; No wheezes, rales, or rhonchi  HEART: S1 normal, loud S2. Regular rate and rhythm; No murmurs, rubs, or gallops  ABDOMEN: Soft, non-tender, non-distended; normal bowel sounds  EXTREMITIES:  2+ peripheral pulses b/l, No clubbing, cyanosis, or edema  NEUROLOGY: A&O x 2, no focal deficits  SKIN: No rashes or lesions. Dressing intact, clean.      LABS:                        10.3   6.42  )-----------( 149      ( 07 Sep 2022 05:57 )             34.4     09-07    142  |  106  |  44<H>  ----------------------------<  118<H>  3.8   |  25  |  0.92    Ca    9.0      07 Sep 2022 05:57  Phos  2.3     09-07  Mg     1.9     09-07    TPro  6.8  /  Alb  3.4  /  TBili  0.9  /  DBili  x   /  AST  31  /  ALT  29  /  AlkPhos  130<H>  09-07    PT/INR - ( 07 Sep 2022 05:57 )   PT: 13.7 sec;   INR: 1.19 ratio         PTT - ( 07 Sep 2022 05:57 )  PTT:29.6 sec            RADIOLOGY & ADDITIONAL TESTS:  Lab Results Reviewed   Imaging Reviewed  Electrocardiogram Reviewed

## 2022-09-07 NOTE — CHART NOTE - NSCHARTNOTEFT_GEN_A_CORE
Procedure: Back mass excisional biopsy  Surgeon: Dr. Tavarez    S: Pt has no complaints. Denies CP, SOB, ARGUETA, calf tenderness. Pain controlled with medication.    O:  T(C): 36.4 (09-07-22 @ 20:12), Max: 36.4 (09-07-22 @ 20:12)  T(F): 97.5 (09-07-22 @ 20:12), Max: 97.5 (09-07-22 @ 20:12)  HR: 88 (09-07-22 @ 20:12) (88 - 88)  BP: 111/62 (09-07-22 @ 20:12) (111/62 - 111/62)  RR: 17 (09-07-22 @ 20:12) (17 - 17)  SpO2: 100% (09-07-22 @ 20:12) (100% - 100%)  Wt(kg): --                        10.3   6.42  )-----------( 149      ( 07 Sep 2022 05:57 )             34.4     09-07    142  |  106  |  44<H>  ----------------------------<  118<H>  3.8   |  25  |  0.92    Ca    9.0      07 Sep 2022 05:57  Phos  2.3     09-07  Mg     1.9     09-07    TPro  6.8  /  Alb  3.4  /  TBili  0.9  /  DBili  x   /  AST  31  /  ALT  29  /  AlkPhos  130<H>  09-07      Gen: NAD, resting comfortably in bed  C/V: NSR  Pulm: Nonlabored breathing, no respiratory distress  Abd: soft, NT/ND  Extrem: WWP, no calf edema, SCDs in place  Back: incision covered w/ gauze and tegaderm w/o strikethrough       A/P: 72yMale s/p above procedure  Diet: DASH, regular  IVF: none  Pain/nausea control: Tylenol, tramadol  DVT ppx: none, will start heparin drip in the future  Dispo plan: 6 [place]

## 2022-09-07 NOTE — BRIEF OPERATIVE NOTE - OPERATION/FINDINGS
upper back midline subcutaneous mass, excision of part of the mass, sent to frozen, confirmed with pathology w/ enough tissue for diagnosis, hemostasis got, skin closed with Vicryl 3-0 and Monocryl 4-0.

## 2022-09-07 NOTE — PROGRESS NOTE ADULT - REASON FOR ADMISSION
Dyspnea
follicular  B cell lymphoma relapse
follicular  B cell lymphoma relapse
lymphoid malignancy with subcutaneous lesions; congestive heart failure
follicular  B cell lymphoma relapse
lymphoma
follicular  B cell lymphoma relapse
follicular B cell lymphoma

## 2022-09-07 NOTE — PROGRESS NOTE ADULT - ASSESSMENT
72M with significant history of HTN, CKD stage II, complete heart block (PPM in place), HLD, HFrEF (45% in 2019; 20% during this admission), and DLBCL (tx with R-CHOP in 2003, with relapse in 2009 treated now with multiple areas of palpable lymphadenopathy with biopsies shown to be at least grade IIIA follicular lymphoma. Patient admitted with tumor lysis syndrome, which was treated with rasburicase and patient developed hemolysis due to G6PD deficiency.     Surgical Oncology consulted for surgical biopsy for definite diagnosis.    PLAN:    - OR today for back lymph node biopsy  - appreciate neurology for optimization /stratification of procedure  - consent in chart  - heparin held since 3am for surgery      [facility]  [id number]

## 2022-09-07 NOTE — PROGRESS NOTE ADULT - PROBLEM SELECTOR PLAN 2
- h/o grade 3 follicular lymphoma, pt was pending surgical biopsy on 8/23, however in heme/onc outpt labs s/f TLS with uric acid 13.7, pt was started rasburicase on 8/23 and developed rasburicase-triggered G6PD hemolysis with hgb 5.7 on 8/26  - UA 6.4 from 13.7 s/p rasburicase on 8/23, now on allopurinol 100mg   - follow-up with heme:      - Started prednisone 100mg daily x5 (day 4/5) days with intent to start Obinutuzumab-COEP inpatient 9/1      - PICC placed (8/30) however patient removed it, PICC placed again (8/31) and ready for chemotherapy initiation      - trend TLS labs (CMP, Mg, Phos, LDH, Uric Acid) q12hrs as patient is high risk at 6am and 6pm daily      - s/p 1st session 9/1, pending 2nd session 9/2  - Surgery consulted for biopsy for definitive diagnosis      - tentatively planned for Wednesday under local anesthesia, neuro ok with biopsy  - Patient medically optimized for excisional biopsy under local anesthesia with general surgery on 9/7/22. - h/o grade 3 follicular lymphoma, pt was pending surgical biopsy on 8/23, however in heme/onc outpt labs s/f TLS with uric acid 13.7, pt was started rasburicase on 8/23 and developed rasburicase-triggered G6PD hemolysis with hgb 5.7 on 8/26  - UA 6.4 from 13.7 s/p rasburicase on 8/23, now on allopurinol 100mg   - follow-up with heme:      - Started prednisone 100mg daily x5 (day 4/5) days with intent to start Obinutuzumab-COEP inpatient 9/1      - PICC placed (8/30) however patient removed it, PICC placed again (8/31) and ready for chemotherapy initiation      - trend TLS labs (CMP, Mg, Phos, LDH, Uric Acid) q12hrs as patient is high risk at 6am and 6pm daily      - s/p 1st session 9/1, pending 2nd session 9/2  - s/p excisional biopsy 9/7.

## 2022-09-07 NOTE — PRE-OP CHECKLIST - 1.
Emotional support and pre op teaching provided to patient and daughter Vonnie  with verbalized understanding.

## 2022-09-07 NOTE — PROGRESS NOTE ADULT - PROBLEM SELECTOR PLAN 1
Has been confused since admission with improvement today (9/1). As per family, this occurs every time the patient is sick with similar symptoms. Considerations include underlying dementia, delirium, vs metabolic causes vs neurological vs malignancy  - AAOx2  - cultures with no growth to date, no acute toxicologies found on labs, no history of cirrhosis with low suspicion for ammonia induced, no indication for renal failure for uremic causes  - CT head non-contrast performed, no official read but as per radiology has acute on chronic of R parietal lobe infarct with no indication of hemorrhagic transformation  - neuro consult       - MRI brain wo contrast, MRA H wo contrast, MRA N w contrast -> tentatively scheduled for Tuesday       - asa, atorvastatin 80mg      - TTE with severe global left ventricular systolic dysfunction, no evidence of thrombi Has been confused since admission with improvement today (9/1). As per family, this occurs every time the patient is sick with similar symptoms. Considerations include underlying dementia, delirium, vs metabolic causes vs neurological vs malignancy  - AAOx2  - cultures with no growth to date, no acute toxicologies found on labs, no history of cirrhosis with low suspicion for ammonia induced, no indication for renal failure for uremic causes  - CT head non-contrast performed, no official read but as per radiology has acute on chronic of R parietal lobe infarct with no indication of hemorrhagic transformation  - neuro consult       - MRI brain wo contrast, MRA H wo contrast, MRA N w contrast done- Old right parietal infarct. No acute infarcts. Normal intracranial circulation, limited MRA of the neck due to motion.      - asa, atorvastatin 80mg      - TTE with severe global left ventricular systolic dysfunction, no evidence of thrombi

## 2022-09-08 LAB
ALBUMIN SERPL ELPH-MCNC: 3.3 G/DL — SIGNIFICANT CHANGE UP (ref 3.3–5)
ALP SERPL-CCNC: 131 U/L — HIGH (ref 40–120)
ALT FLD-CCNC: 25 U/L — SIGNIFICANT CHANGE UP (ref 10–45)
ANION GAP SERPL CALC-SCNC: 12 MMOL/L — SIGNIFICANT CHANGE UP (ref 5–17)
AST SERPL-CCNC: 26 U/L — SIGNIFICANT CHANGE UP (ref 10–40)
BILIRUB SERPL-MCNC: 0.8 MG/DL — SIGNIFICANT CHANGE UP (ref 0.2–1.2)
BUN SERPL-MCNC: 32 MG/DL — HIGH (ref 7–23)
CALCIUM SERPL-MCNC: 9.1 MG/DL — SIGNIFICANT CHANGE UP (ref 8.4–10.5)
CHLORIDE SERPL-SCNC: 108 MMOL/L — SIGNIFICANT CHANGE UP (ref 96–108)
CO2 SERPL-SCNC: 24 MMOL/L — SIGNIFICANT CHANGE UP (ref 22–31)
CREAT SERPL-MCNC: 0.92 MG/DL — SIGNIFICANT CHANGE UP (ref 0.5–1.3)
EGFR: 89 ML/MIN/1.73M2 — SIGNIFICANT CHANGE UP
GLUCOSE BLDC GLUCOMTR-MCNC: 103 MG/DL — HIGH (ref 70–99)
GLUCOSE BLDC GLUCOMTR-MCNC: 104 MG/DL — HIGH (ref 70–99)
GLUCOSE BLDC GLUCOMTR-MCNC: 137 MG/DL — HIGH (ref 70–99)
GLUCOSE BLDC GLUCOMTR-MCNC: 158 MG/DL — HIGH (ref 70–99)
GLUCOSE SERPL-MCNC: 93 MG/DL — SIGNIFICANT CHANGE UP (ref 70–99)
HCT VFR BLD CALC: 33.2 % — LOW (ref 39–50)
HGB BLD-MCNC: 9.9 G/DL — LOW (ref 13–17)
LDH SERPL L TO P-CCNC: 250 U/L — HIGH (ref 50–242)
MAGNESIUM SERPL-MCNC: 1.7 MG/DL — SIGNIFICANT CHANGE UP (ref 1.6–2.6)
MCHC RBC-ENTMCNC: 25.9 PG — LOW (ref 27–34)
MCHC RBC-ENTMCNC: 29.8 GM/DL — LOW (ref 32–36)
MCV RBC AUTO: 86.9 FL — SIGNIFICANT CHANGE UP (ref 80–100)
NRBC # BLD: 0 /100 WBCS — SIGNIFICANT CHANGE UP (ref 0–0)
PHOSPHATE SERPL-MCNC: 2.9 MG/DL — SIGNIFICANT CHANGE UP (ref 2.5–4.5)
PLATELET # BLD AUTO: 169 K/UL — SIGNIFICANT CHANGE UP (ref 150–400)
POTASSIUM SERPL-MCNC: 3.7 MMOL/L — SIGNIFICANT CHANGE UP (ref 3.5–5.3)
POTASSIUM SERPL-SCNC: 3.7 MMOL/L — SIGNIFICANT CHANGE UP (ref 3.5–5.3)
PROT SERPL-MCNC: 6.7 G/DL — SIGNIFICANT CHANGE UP (ref 6–8.3)
RBC # BLD: 3.82 M/UL — LOW (ref 4.2–5.8)
RBC # FLD: 19.2 % — HIGH (ref 10.3–14.5)
SODIUM SERPL-SCNC: 144 MMOL/L — SIGNIFICANT CHANGE UP (ref 135–145)
URATE SERPL-MCNC: 7 MG/DL — SIGNIFICANT CHANGE UP (ref 3.4–8.8)
WBC # BLD: 10.25 K/UL — SIGNIFICANT CHANGE UP (ref 3.8–10.5)
WBC # FLD AUTO: 10.25 K/UL — SIGNIFICANT CHANGE UP (ref 3.8–10.5)

## 2022-09-08 PROCEDURE — 99232 SBSQ HOSP IP/OBS MODERATE 35: CPT

## 2022-09-08 PROCEDURE — 99232 SBSQ HOSP IP/OBS MODERATE 35: CPT | Mod: GC

## 2022-09-08 RX ORDER — APIXABAN 2.5 MG/1
5 TABLET, FILM COATED ORAL
Refills: 0 | Status: DISCONTINUED | OUTPATIENT
Start: 2022-09-08 | End: 2022-09-12

## 2022-09-08 RX ORDER — METOPROLOL TARTRATE 50 MG
25 TABLET ORAL ONCE
Refills: 0 | Status: COMPLETED | OUTPATIENT
Start: 2022-09-08 | End: 2022-09-08

## 2022-09-08 RX ORDER — SACUBITRIL AND VALSARTAN 24; 26 MG/1; MG/1
1 TABLET, FILM COATED ORAL
Qty: 0 | Refills: 0 | DISCHARGE
Start: 2022-09-08

## 2022-09-08 RX ORDER — SACUBITRIL AND VALSARTAN 24; 26 MG/1; MG/1
1 TABLET, FILM COATED ORAL
Qty: 30 | Refills: 2
Start: 2022-09-08 | End: 2022-12-06

## 2022-09-08 RX ORDER — METOPROLOL TARTRATE 50 MG
75 TABLET ORAL DAILY
Refills: 0 | Status: DISCONTINUED | OUTPATIENT
Start: 2022-09-09 | End: 2022-09-09

## 2022-09-08 RX ORDER — TRAMADOL HYDROCHLORIDE 50 MG/1
50 TABLET ORAL ONCE
Refills: 0 | Status: DISCONTINUED | OUTPATIENT
Start: 2022-09-08 | End: 2022-09-08

## 2022-09-08 RX ORDER — SPIRONOLACTONE 25 MG/1
25 TABLET, FILM COATED ORAL DAILY
Refills: 0 | Status: DISCONTINUED | OUTPATIENT
Start: 2022-09-08 | End: 2022-09-12

## 2022-09-08 RX ADMIN — Medication 1 TABLET(S): at 11:43

## 2022-09-08 RX ADMIN — SACUBITRIL AND VALSARTAN 1 TABLET(S): 24; 26 TABLET, FILM COATED ORAL at 17:23

## 2022-09-08 RX ADMIN — Medication 975 MILLIGRAM(S): at 10:44

## 2022-09-08 RX ADMIN — TRAMADOL HYDROCHLORIDE 50 MILLIGRAM(S): 50 TABLET ORAL at 12:22

## 2022-09-08 RX ADMIN — Medication 50 MILLIGRAM(S): at 05:14

## 2022-09-08 RX ADMIN — Medication 975 MILLIGRAM(S): at 09:51

## 2022-09-08 RX ADMIN — CHLORHEXIDINE GLUCONATE 1 APPLICATION(S): 213 SOLUTION TOPICAL at 12:52

## 2022-09-08 RX ADMIN — ATORVASTATIN CALCIUM 40 MILLIGRAM(S): 80 TABLET, FILM COATED ORAL at 21:15

## 2022-09-08 RX ADMIN — Medication 400 MILLIGRAM(S): at 18:54

## 2022-09-08 RX ADMIN — SACUBITRIL AND VALSARTAN 1 TABLET(S): 24; 26 TABLET, FILM COATED ORAL at 05:13

## 2022-09-08 RX ADMIN — APIXABAN 5 MILLIGRAM(S): 2.5 TABLET, FILM COATED ORAL at 17:23

## 2022-09-08 RX ADMIN — TRAMADOL HYDROCHLORIDE 50 MILLIGRAM(S): 50 TABLET ORAL at 10:42

## 2022-09-08 RX ADMIN — Medication 400 MILLIGRAM(S): at 05:13

## 2022-09-08 RX ADMIN — CHLORHEXIDINE GLUCONATE 1 APPLICATION(S): 213 SOLUTION TOPICAL at 05:15

## 2022-09-08 RX ADMIN — Medication 25 MILLIGRAM(S): at 13:44

## 2022-09-08 RX ADMIN — Medication 100 MILLIGRAM(S): at 11:43

## 2022-09-08 RX ADMIN — SPIRONOLACTONE 25 MILLIGRAM(S): 25 TABLET, FILM COATED ORAL at 15:33

## 2022-09-08 RX ADMIN — Medication 975 MILLIGRAM(S): at 12:02

## 2022-09-08 RX ADMIN — Medication 1: at 12:03

## 2022-09-08 NOTE — PROGRESS NOTE ADULT - SUBJECTIVE AND OBJECTIVE BOX
PROGRESS NOTE:   Authored by Dave Vanegas MD   Patient is a 72y old  Male who presents with a chief complaint of lymphoma (07 Sep 2022 07:25)      SUBJECTIVE / OVERNIGHT EVENTS: No acute events overnight. No chest pain,  palpitations, dyspnea, abdominal pain. nausea, vomiting, diarrhea, blurry vision, dysuria, lightheadedness, dizziness.    ADDITIONAL REVIEW OF SYSTEMS:    MEDICATIONS  (STANDING):  acetaminophen     Tablet .. 975 milliGRAM(s) Oral every 6 hours  acyclovir   Oral Tab/Cap 400 milliGRAM(s) Oral two times a day  allopurinol 100 milliGRAM(s) Oral daily  atorvastatin 40 milliGRAM(s) Oral at bedtime  chlorhexidine 2% Cloths 1 Application(s) Topical daily  chlorhexidine 4% Liquid 1 Application(s) Topical <User Schedule>  dextrose 5%. 1000 milliLiter(s) (100 mL/Hr) IV Continuous <Continuous>  dextrose 5%. 1000 milliLiter(s) (50 mL/Hr) IV Continuous <Continuous>  dextrose 50% Injectable 25 Gram(s) IV Push once  dextrose 50% Injectable 12.5 Gram(s) IV Push once  dextrose 50% Injectable 25 Gram(s) IV Push once  glucagon  Injectable 1 milliGRAM(s) IntraMuscular once  insulin lispro (ADMELOG) corrective regimen sliding scale   SubCutaneous Before meals and at bedtime  metoprolol succinate ER 50 milliGRAM(s) Oral daily  multivitamin 1 Tablet(s) Oral daily  sacubitril 49 mG/valsartan 51 mG 1 Tablet(s) Oral every 12 hours    MEDICATIONS  (PRN):  dextrose Oral Gel 15 Gram(s) Oral once PRN Blood Glucose LESS THAN 70 milliGRAM(s)/deciliter  melatonin 3 milliGRAM(s) Oral at bedtime PRN Insomnia  sodium chloride 0.9% lock flush 10 milliLiter(s) IV Push every 1 hour PRN Pre/post blood products, medications, blood draw, and to maintain line patency  traMADol 25 milliGRAM(s) Oral every 4 hours PRN Moderate Pain (4 - 6)  traMADol 50 milliGRAM(s) Oral every 4 hours PRN Severe Pain (7 - 10)      CAPILLARY BLOOD GLUCOSE      POCT Blood Glucose.: 103 mg/dL (08 Sep 2022 07:25)  POCT Blood Glucose.: 191 mg/dL (07 Sep 2022 21:15)  POCT Blood Glucose.: 106 mg/dL (07 Sep 2022 16:16)  POCT Blood Glucose.: 106 mg/dL (07 Sep 2022 15:32)    I&O's Summary    07 Sep 2022 07:01  -  08 Sep 2022 07:00  --------------------------------------------------------  IN: 240 mL / OUT: 1075 mL / NET: -835 mL        PHYSICAL EXAM:  Vital Signs Last 24 Hrs  T(C): 36.7 (08 Sep 2022 04:05), Max: 36.7 (07 Sep 2022 11:02)  T(F): 98 (08 Sep 2022 04:05), Max: 98.1 (07 Sep 2022 11:02)  HR: 88 (08 Sep 2022 04:05) (79 - 100)  BP: 128/69 (08 Sep 2022 04:05) (101/60 - 149/82)  BP(mean): 94 (07 Sep 2022 15:45) (86 - 99)  RR: 18 (08 Sep 2022 04:05) (14 - 18)  SpO2: 100% (08 Sep 2022 04:05) (96% - 100%)    Parameters below as of 08 Sep 2022 04:05  Patient On (Oxygen Delivery Method): room air          GENERAL: No apparent distress.   HEAD:  Atraumatic, Normocephalic  EYES: EOMI, PERRLA, conjunctiva and sclera clear  NECK: Supple, no lymphadenopathy, no JVD  CHEST/LUNG: Clear to auscultation bilateral and symmetric; No wheezes, rales, or rhonchi  HEART: S1 normal, loud S2. Regular rate and rhythm; No murmurs, rubs, or gallops  ABDOMEN: Soft, non-tender, non-distended; normal bowel sounds  EXTREMITIES:  2+ peripheral pulses b/l, No clubbing, cyanosis, or edema  NEUROLOGY: A&O x 2, no focal deficits  SKIN: No rashes or lesions. Dressing intact, clean.      LABS:                        9.9    10.25 )-----------( 169      ( 08 Sep 2022 06:55 )             33.2     09-08    144  |  108  |  32<H>  ----------------------------<  93  3.7   |  24  |  0.92    Ca    9.1      08 Sep 2022 06:54  Phos  2.9     09-08  Mg     1.7     09-08    TPro  6.7  /  Alb  3.3  /  TBili  0.8  /  DBili  x   /  AST  26  /  ALT  25  /  AlkPhos  131<H>  09-08    PT/INR - ( 07 Sep 2022 05:57 )   PT: 13.7 sec;   INR: 1.19 ratio         PTT - ( 07 Sep 2022 05:57 )  PTT:29.6 sec            RADIOLOGY & ADDITIONAL TESTS:  Lab Results Reviewed   Imaging Reviewed  Electrocardiogram Reviewed

## 2022-09-08 NOTE — PROGRESS NOTE ADULT - SUBJECTIVE AND OBJECTIVE BOX
Surgery Progress Note    INTERVAl/SUBJECTIVE: No acute event overnight. Patient seen and examined in am rounds, found to be without acute distress.  Pain under control, back incision c/d/i. Dressing changed at bedside.    Vital Signs Last 24 Hrs  T(C): 36.7 (08 Sep 2022 04:05), Max: 36.7 (07 Sep 2022 11:02)  T(F): 98 (08 Sep 2022 04:05), Max: 98.1 (07 Sep 2022 11:02)  HR: 88 (08 Sep 2022 04:05) (79 - 100)  BP: 128/69 (08 Sep 2022 04:05) (101/60 - 149/82)  BP(mean): 94 (07 Sep 2022 15:45) (86 - 99)  RR: 18 (08 Sep 2022 04:05) (14 - 18)  SpO2: 100% (08 Sep 2022 04:05) (96% - 100%)    Parameters below as of 08 Sep 2022 04:05  Patient On (Oxygen Delivery Method): room air        Physical Exam:  General: Appears well, NAD  CHEST: breathing comfortably  CV: appears well perfused  Abdomen: soft, nontender, nondistended, no rebound or guarding  Back: back incision c/d/i.  Extremities: Grossly symmetric    LABS:                        10.3   6.42  )-----------( 149      ( 07 Sep 2022 05:57 )             34.4     09-07    142  |  106  |  44<H>  ----------------------------<  118<H>  3.8   |  25  |  0.92    Ca    9.0      07 Sep 2022 05:57  Phos  2.3     09-07  Mg     1.9     09-07    TPro  6.8  /  Alb  3.4  /  TBili  0.9  /  DBili  x   /  AST  31  /  ALT  29  /  AlkPhos  130<H>  09-07    PT/INR - ( 07 Sep 2022 05:57 )   PT: 13.7 sec;   INR: 1.19 ratio         PTT - ( 07 Sep 2022 05:57 )  PTT:29.6 sec      INs and OUTs:    09-06-22 @ 07:01  -  09-07-22 @ 07:00  --------------------------------------------------------  IN: 440 mL / OUT: 1400 mL / NET: -960 mL    09-07-22 @ 07:01  -  09-08-22 @ 06:46  --------------------------------------------------------  IN: 240 mL / OUT: 775 mL / NET: -535 mL

## 2022-09-08 NOTE — PROGRESS NOTE ADULT - ASSESSMENT
71 yo M w/ PMH CHB s/p PPM upgraded to CRT-P, HFrEF 2/2 chemo, DLBCL s/p chemo, HTN, nonobstructive CAD, Aflutter on apixaban who presented w/ SOB and anemia. 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 He was started on rasburicase for TLS but with notable G6PD hemolytics anemia. Found to be in heart failure exacerbation. Was started on BiPAP given his tachypnea. CXRAY showed R pleural effusion with known R middle lobe opacity.    Patient was planned for excisional biopsy under general anesthesia. However, patient with severe MR on ECHO, was still fluid overloaded, and elevated troponin without re-evaluation of his coronaries; thus was not medically optimized for general anesthesia, had recommended local anesthesia for biopsy of tissue sample which was done 9/7. Hospital course complicated by acute/subacute R parietal stroke which precluded him from non-invasive ischemic evaluation in acute setting     Improved in terms of volume status with diuresis, weaned off BIPAP.       Echo:  9/2/22: LVEF 30%, severe MR, mildly decreased RV function. Similar to prior echo  8/27/22: LVEF 20%, decreased RV function, severe MR  11/7/2019: LVEF 40%, moderate pulmonary hypertension, enlarged LA size, severe mitral regurgitation    Cardiac Cath: 5/2019, 70% 1st diagonal, 60% distal circumflex, otherwise no occlusive disease      Pacemaker: 9/30/2019, [employer] [id number] BiV DDDR

## 2022-09-08 NOTE — PROGRESS NOTE ADULT - PROBLEM SELECTOR PLAN 2
- h/o grade 3 follicular lymphoma, pt was pending surgical biopsy on 8/23, however in heme/onc outpt labs s/f TLS with uric acid 13.7, pt was started rasburicase on 8/23 and developed rasburicase-triggered G6PD hemolysis with hgb 5.7 on 8/26  - UA 6.4 from 13.7 s/p rasburicase on 8/23, now on allopurinol 100mg   - follow-up with heme:      - Started prednisone 100mg daily x5 (day 4/5) days with intent to start Obinutuzumab-COEP inpatient 9/1      - PICC placed (8/30) however patient removed it, PICC placed again (8/31) and ready for chemotherapy initiation      - trend TLS labs (CMP, Mg, Phos, LDH, Uric Acid) q12hrs as patient is high risk at 6am and 6pm daily      - s/p 1st session 9/1, pending 2nd session 9/2  - s/p excisional biopsy 9/7. - h/o grade 3 follicular lymphoma, pt was pending surgical biopsy on 8/23, however in heme/onc outpt labs s/f TLS with uric acid 13.7, pt was started rasburicase on 8/23 and developed rasburicase-triggered G6PD hemolysis with hgb 5.7 on 8/26  - UA 6.4 from 13.7 s/p rasburicase on 8/23, now on allopurinol 100mg   - follow-up with heme:      - Started prednisone 100mg daily x5 (day 4/5) days with intent to start Obinutuzumab-COEP inpatient 9/1      - PICC placed (8/30) however patient removed it, PICC placed again (8/31) and ready for chemotherapy initiation      - trend TLS labs (CMP, Mg, Phos, LDH, Uric Acid) q12hrs as patient is high risk at 6am and 6pm daily      - s/p 1st session 9/1, pending 2nd session 9/2  - s/p excisional biopsy 9/7.  - obinutuzumab on 9/9 History of HFrEF EF 45%, diffuse LV hypokinesis, mod pulmHTN; followed by cardiology outpatient, elevated proBNP with uptrending troponins, now downtrending. TTE (8/27) ef=20%, severe MR, mild LV enlargement, normal RA, RV enlargement with decreased RVSF, mild-mod tricuspid regurg, moderate pulm pressures, b/l pleural effusions   - metoprolol succinate 75 mg PO QD  - Entresto 49/51 PO BID  - spironolactone 25 mg PO QD

## 2022-09-08 NOTE — PROGRESS NOTE ADULT - PROBLEM SELECTOR PLAN 3
heme/onc      - s/p 1 session chemotherapy: TLS labs significant for uric acid 8.2, phosphorus 5.6      - s/p 2 sessions chemotherapy: TLS labs significant for uric acid 8.3, phosphorus 5.4 s/p sevelamer       - s/p 3 sessions chemotherapy: TLS labs significant for uric acid 9.1, phosphorus 2.9      - most recent G6PD level 11.1 (wnl) following multiple transfusions of pRBC      - patient is at high risk for TLS but still requires chemotherapy iso lymphoma, comfortable to give rasburicase if needed with improved G6PD levels --> as per heme/onc if uric acid > 10, contact fellow first prior to 3mg rasburicase      - 9/4 AM with uric acid 9.4, phos 1.6, will d/c sevelamer   - on allopurinol 100mg  - uric acid still under threshold set by heme/onc will c/w monitor for now, if uptrending will reach out for further recs,  - obinutuzumab on 9/9 can be done outpatient or inpatient depending on disposition. heme/onc      - s/p 1 session chemotherapy: TLS labs significant for uric acid 8.2, phosphorus 5.6      - s/p 2 sessions chemotherapy: TLS labs significant for uric acid 8.3, phosphorus 5.4 s/p sevelamer       - s/p 3 sessions chemotherapy: TLS labs significant for uric acid 9.1, phosphorus 2.9      - most recent G6PD level 11.1 (wnl) following multiple transfusions of pRBC      - patient is at high risk for TLS but still requires chemotherapy iso lymphoma, comfortable to give rasburicase if needed with improved G6PD levels --> as per heme/onc if uric acid > 10, contact fellow first prior to 3mg rasburicase      - 9/4 AM with uric acid 9.4, phos 1.6, will d/c sevelamer   - on allopurinol 100mg  - uric acid still under threshold set by heme/onc will c/w monitor for now, if uptrending will reach out for further recs,  - obinutuzumab on 9/9

## 2022-09-08 NOTE — PROGRESS NOTE ADULT - PROBLEM SELECTOR PLAN 2
Unclear etiology, chemotherapy related vs direct toxicity vs. heart failure exacerbation. Troponins peaked.  -If no c/i after biopsy would start ASA  -Would benefit from ischemic evaluation in outpatient setting (held off inpatient due to acute stroke)

## 2022-09-08 NOTE — PROGRESS NOTE ADULT - PROBLEM SELECTOR PLAN 1
Has been confused since admission with improvement today (9/1). As per family, this occurs every time the patient is sick with similar symptoms. Considerations include underlying dementia, delirium, vs metabolic causes vs neurological vs malignancy  - AAOx2  - cultures with no growth to date, no acute toxicologies found on labs, no history of cirrhosis with low suspicion for ammonia induced, no indication for renal failure for uremic causes  - CT head non-contrast performed, no official read but as per radiology has acute on chronic of R parietal lobe infarct with no indication of hemorrhagic transformation  - neuro consult       - MRI brain wo contrast, MRA H wo contrast, MRA N w contrast done- Old right parietal infarct. No acute infarcts. Normal intracranial circulation, limited MRA of the neck due to motion.      - asa, atorvastatin 80mg      - TTE with severe global left ventricular systolic dysfunction, no evidence of thrombi - h/o grade 3 follicular lymphoma, pt was pending surgical biopsy on 8/23, however in heme/onc outpt labs s/f TLS with uric acid 13.7, pt was started rasburicase on 8/23 and developed rasburicase-triggered G6PD hemolysis with hgb 5.7 on 8/26  - UA 6.4 from 13.7 s/p rasburicase on 8/23, now on allopurinol 100mg   - follow-up with heme:      - Started prednisone 100mg daily x5 (day 4/5) days with intent to start Obinutuzumab-COEP inpatient 9/1      - PICC placed (8/30) however patient removed it, PICC placed again (8/31) and ready for chemotherapy initiation      - trend TLS labs (CMP, Mg, Phos, LDH, Uric Acid) q12hrs as patient is high risk at 6am and 6pm daily      - s/p 1st session 9/1, pending 2nd session 9/2  - s/p excisional biopsy 9/7.  - obinutuzumab on 9/9

## 2022-09-08 NOTE — PROGRESS NOTE ADULT - SUBJECTIVE AND OBJECTIVE BOX
Neurology Progress Note    S: Patient seen and examined. No new events overnight.  s/p bx yesterday     Medication:    MEDICATIONS  (STANDING):  acyclovir   Oral Tab/Cap 400 milliGRAM(s) Oral two times a day  allopurinol 100 milliGRAM(s) Oral daily  apixaban 5 milliGRAM(s) Oral two times a day  atorvastatin 40 milliGRAM(s) Oral at bedtime  chlorhexidine 2% Cloths 1 Application(s) Topical daily  chlorhexidine 4% Liquid 1 Application(s) Topical <User Schedule>  dextrose 5%. 1000 milliLiter(s) (100 mL/Hr) IV Continuous <Continuous>  dextrose 5%. 1000 milliLiter(s) (50 mL/Hr) IV Continuous <Continuous>  dextrose 50% Injectable 25 Gram(s) IV Push once  dextrose 50% Injectable 12.5 Gram(s) IV Push once  dextrose 50% Injectable 25 Gram(s) IV Push once  glucagon  Injectable 1 milliGRAM(s) IntraMuscular once  insulin lispro (ADMELOG) corrective regimen sliding scale   SubCutaneous Before meals and at bedtime  metoprolol succinate ER 50 milliGRAM(s) Oral daily  multivitamin 1 Tablet(s) Oral daily  sacubitril 49 mG/valsartan 51 mG 1 Tablet(s) Oral every 12 hours    MEDICATIONS  (PRN):  dextrose Oral Gel 15 Gram(s) Oral once PRN Blood Glucose LESS THAN 70 milliGRAM(s)/deciliter  melatonin 3 milliGRAM(s) Oral at bedtime PRN Insomnia  sodium chloride 0.9% lock flush 10 milliLiter(s) IV Push every 1 hour PRN Pre/post blood products, medications, blood draw, and to maintain line patency  traMADol 25 milliGRAM(s) Oral every 4 hours PRN Moderate Pain (4 - 6)  traMADol 50 milliGRAM(s) Oral every 4 hours PRN Severe Pain (7 - 10)          Vitals:  Vital Signs Last 24 Hrs  T(C): 36.7 (08 Sep 2022 04:05), Max: 36.7 (07 Sep 2022 11:09)  T(F): 98 (08 Sep 2022 04:05), Max: 98.1 (07 Sep 2022 11:09)  HR: 88 (08 Sep 2022 04:05) (79 - 100)  BP: 128/69 (08 Sep 2022 04:05) (101/60 - 149/82)  BP(mean): 94 (07 Sep 2022 15:45) (86 - 99)  RR: 18 (08 Sep 2022 04:05) (14 - 18)  SpO2: 100% (08 Sep 2022 04:05) (96% - 100%)    Parameters below as of 08 Sep 2022 04:05  Patient On (Oxygen Delivery Method): room air          General Exam:   General Appearance: Appropriately dressed and in no acute distress       Head: Normocephalic, atraumatic and no dysmorphic features  Ear, Nose, and Throat: Moist mucous membranes  CVS: S1S2+  Resp: No SOB, no wheeze or rhonchi  Abd: soft NTND  Extremities: No edema, no cyanosis  Skin: No bruises, no rashes     Neurological Exam:  - Mental Status:  Alert, awake, oriented to person, not place, and time; Speech is fluent with intact naming.   - Cranial Nerves II-XII:    II:  Visual fields are full to confrontation; Pupils are equal, round, and reactive to light  III, IV, VI:  Extraocular movements are intact without nystagmus.  V:  Facial sensation is intact in the V1-V3 distribution bilaterally.  VII:  Face is symmetric with normal eye closure and smile  VIII:  Hearing is intact to finger rub.  IX, X:  Uvula is midline and soft palate rises symmetrically  XI:  Head turning and shoulder shrug are intact.  XII:  Tongue protudes in the midline.  - Motor:  Strength is 5/5 throughout.  There is no pronator drift.  Normal muscle bulk and tone throughout.  - Sensory:  Intact throughout to light touch.  - Coordination:  Finger-nose-finger and heel-knee-shin intact without dysmetria, but b/l intention tremor.   - Gait:   Due to fall risk, did not assess.       I personally reviewed the below data/images/labs:    CBC Full  -  ( 08 Sep 2022 06:55 )  WBC Count : 10.25 K/uL  RBC Count : 3.82 M/uL  Hemoglobin : 9.9 g/dL  Hematocrit : 33.2 %  Platelet Count - Automated : 169 K/uL  Mean Cell Volume : 86.9 fl  Mean Cell Hemoglobin : 25.9 pg  Mean Cell Hemoglobin Concentration : 29.8 gm/dL  Auto Neutrophil # : x  Auto Lymphocyte # : x  Auto Monocyte # : x  Auto Eosinophil # : x  Auto Basophil # : x  Auto Neutrophil % : x  Auto Lymphocyte % : x  Auto Monocyte % : x  Auto Eosinophil % : x  Auto Basophil % : x          144  |  108  |  32<H>  ----------------------------<  93  3.7   |  24  |  0.92    Ca    9.1      08 Sep 2022 06:54  Phos  2.9       Mg     1.7         TPro  6.7  /  Alb  3.3  /  TBili  0.8  /  DBili  x   /  AST  26  /  ALT  25  /  AlkPhos  131<H>        Urinalysis Basic - ( 01 Sep 2022 12:01 )    Color: Light Yellow / Appearance: Clear / S.011 / pH: x  Gluc: x / Ketone: Negative  / Bili: Negative / Urobili: Negative   Blood: x / Protein: Negative / Nitrite: Negative   Leuk Esterase: Negative / RBC: x / WBC x   Sq Epi: x / Non Sq Epi: x / Bacteria: x    Acute/subacute right-sided parietal lobe infarction   superimposed upon a chronic infarct. Findings appear larger in size when   compared with 2019.    No hemorrhagic transformation at this time.    Similar-appearing mild chronic white matter microvascular type changes   and chronic lacunar infarct within the right basal ganglia.  < from: MR Head No Cont (22 @ 13:22) >    ACC: [id number] EXAM:  MR ANGIO NECK                        ACC: [id number] EXAM:  MR BRAIN                        ACC: [id number] EXAM:  MR ANGIO BRAIN                          PROCEDURE DATE:  2022          INTERPRETATION:  CLINICAL INDICATION: Acute on chronic CVA      Magnetic resonance imaging of the brain was carried out with axial   susceptibility weighted series, diffusion weighted series and sagittal T1   weighted series on a 1.5 Nell magnet.    Magnetic resonance angiography of the carotid and vertebral circulation   the neck was obtained using 2D time-of-flight technique. The intracranial   circulation centered the Flgnib-ha-Vuczli was evaluated using 3D   time-of-flight technique.    The Comparison is made with the prior brain CT of 2022.      The patient was claustrophobic and only part of the MRA of the neck and   brain MRI were obtained.    The images of the neck are limited by motion. Be further evaluated with   CTA as clinically indicated.    There is mild to moderate atrophy. Small vessel white matter ischemic   changes are noted. There is an old right parietal infarct. No acute   infarcts are identified on diffusion-weighted imaging. There is some   hemosiderin staining in the old parietal infarct..    The neck MRA is limited by motion and internal carotid artery stenosis is   not excluded. The right vertebral artery is dominant.    The distal cervical, petrous cavernous and supraclinoid internal carotid   arteries are normal. The anterior cerebral arteries and anterior   communicating artery are normal. The middle cerebral arteries are normal.   The basilar artery, posterior cerebral arteries and superior cerebellar   arteries are normal. The anterior inferior cerebellar arteries are   normal. Bilateral posterior communicating arteries are visualized.      IMPRESSION: Limited examination as the patient was unable to cooperate.   Old right parietal infarct. No acute infarcts. Normal intracranial   circulation, limited MRA of the neck due to motion.    --- End of Report ---            CAITY MCGUIRE MD; Attending Radiologist  This document has been electronically signed. Sep  6 2022  1:41PM    < end of copied text >

## 2022-09-08 NOTE — PROGRESS NOTE ADULT - ASSESSMENT
72M with significant history of HTN, CKD stage II, complete heart block (PPM in place), HLD, HFrEF (45% in 2019; 20% during this admission), and DLBCL (tx with R-CHOP in 2003, with relapse in 2009 treated now with multiple areas of palpable lymphadenopathy with biopsies shown to be at least grade IIIA follicular lymphoma. Patient admitted with tumor lysis syndrome, which was treated with rasburicase and patient developed hemolysis due to G6PD deficiency.     Surgical Oncology consulted for surgical biopsy for definite diagnosis. s/p excisional biopsy of back mass on 9/7/2022.    PLAN:    - ok to restart AC today, no bolus dose  - appreciate neurology for optimization /stratification of procedure  - car per primary team      Chris Perez PGY-2  Red team Surgery  [id number]  72M with significant history of HTN, CKD stage II, complete heart block (PPM in place), HLD, HFrEF (45% in 2019; 20% during this admission), and DLBCL (tx with R-CHOP in 2003, with relapse in 2009 treated now with multiple areas of palpable lymphadenopathy with biopsies shown to be at least grade IIIA follicular lymphoma. Patient admitted with tumor lysis syndrome, which was treated with rasburicase and patient developed hemolysis due to G6PD deficiency.     Surgical Oncology consulted for surgical biopsy for definite diagnosis. s/p excisional biopsy of back mass on 9/7/2022.    PLAN:    - ok to restart AC today, no bolus dose  - appreciate neurology for optimization /stratification of procedure  - car per primary team  - follow up as needed with Dr. Salvador Tavarez    Please call back with any additional questions or concerns    Chris Perez PGY-2  Red team Surgery  [id number]

## 2022-09-08 NOTE — PROGRESS NOTE ADULT - SUBJECTIVE AND OBJECTIVE BOX
Interval History:  Underwent excisional biopsy the day prior     Medications:  acyclovir   Oral Tab/Cap 400 milliGRAM(s) Oral two times a day  allopurinol 100 milliGRAM(s) Oral daily  apixaban 5 milliGRAM(s) Oral two times a day  atorvastatin 40 milliGRAM(s) Oral at bedtime  chlorhexidine 2% Cloths 1 Application(s) Topical daily  chlorhexidine 4% Liquid 1 Application(s) Topical <User Schedule>  dextrose 5%. 1000 milliLiter(s) IV Continuous <Continuous>  dextrose 5%. 1000 milliLiter(s) IV Continuous <Continuous>  dextrose 50% Injectable 25 Gram(s) IV Push once  dextrose 50% Injectable 12.5 Gram(s) IV Push once  dextrose 50% Injectable 25 Gram(s) IV Push once  dextrose Oral Gel 15 Gram(s) Oral once PRN  glucagon  Injectable 1 milliGRAM(s) IntraMuscular once  insulin lispro (ADMELOG) corrective regimen sliding scale   SubCutaneous Before meals and at bedtime  melatonin 3 milliGRAM(s) Oral at bedtime PRN  multivitamin 1 Tablet(s) Oral daily  sacubitril 49 mG/valsartan 51 mG 1 Tablet(s) Oral every 12 hours  sodium chloride 0.9% lock flush 10 milliLiter(s) IV Push every 1 hour PRN  spironolactone 25 milliGRAM(s) Oral daily  traMADol 25 milliGRAM(s) Oral every 4 hours PRN  traMADol 50 milliGRAM(s) Oral every 4 hours PRN      Vitals:  T(C): 36.8 (22 @ 11:10), Max: 36.8 (22 @ 11:10)  HR: 96 (22 @ 11:10) (79 - 96)  BP: 124/74 (22 @ 11:10) (101/60 - 142/64)  BP(mean): 94 (22 @ 15:45) (92 - 94)  RR: 18 (22 @ 11:10) (15 - 18)  SpO2: 100% (22 @ 11:10) (96% - 100%)    Daily     Daily Weight in k (08 Sep 2022 08:07)    Weight (kg): 76.7 ( @ 12:11)    I&O's Summary    07 Sep 2022 07:01  -  08 Sep 2022 07:00  --------------------------------------------------------  IN: 240 mL / OUT: 1075 mL / NET: -835 mL    08 Sep 2022 07:01  -  08 Sep 2022 15:09  --------------------------------------------------------  IN: 200 mL / OUT: 0 mL / NET: 200 mL        Physical Exam:  Appearance: No Acute Distress  HEENT: PERRL  Neck: improved JVD  Cardiovascular: Normal S1 S2  Respiratory: improved lung sounds  Gastrointestinal: Soft, Non-tender	  Skin: No cyanosis, surgical site c/d/i  Neurologic: Non-focal  Extremities: No LE edema  Psychiatry: A & O x 1-2    Labs:                        9.9    10.25 )-----------( 169      ( 08 Sep 2022 06:55 )             33.2     -08    144  |  108  |  32<H>  ----------------------------<  93  3.7   |  24  |  0.92    Ca    9.1      08 Sep 2022 06:54  Phos  2.9       Mg     1.7     08    TPro  6.7  /  Alb  3.3  /  TBili  0.8  /  DBili  x   /  AST  26  /  ALT  25  /  AlkPhos  131<H>  09-08    PT/INR - ( 07 Sep 2022 05:57 )   PT: 13.7 sec;   INR: 1.19 ratio         PTT - ( 07 Sep 2022 05:57 )  PTT:29.6 sec        TELEMETRY:    Echocardiogram:

## 2022-09-08 NOTE — PROGRESS NOTE ADULT - ASSESSMENT
72 year old male with HTN, CKD stage II, complete heart block (PPM in place), HLD, HFrEF (45% in 2019), and DLBCL (tx with R-CHOP in 2003, with relapse in 2009 treated with BR) now with recently diagnosed grade 3 follicular lymphoma on 8/23 who presents with anemia and SOB, patient was pending a surgical biopsy on 8/25. Admitted for TLS and rasburicase-triggered G6PD hemolytic anemia. Given that patient has developed confusion this admission and is now AOx1, primary team obtained a CTH, which shows acute/subacute R parietal infarction (larger in size when compared with 5/2019), chronic lacunar infarct in R basal ganglia, mild chronic white matter microvascular disease, no hemorrhagic transformation. Denies headache, weakness, numbness, dizziness, visual disturbances. NIHSS 2-did not know month and age. Of note, patient has had multiple prior episodes of confusion and cognitive difficulties.     CTH, which shows acute/subacute R parietal infarction, chronic R BG infarct noted   LDL 81   A1c 5.4   s/p excisional bx 9/7   MRI/A brain done: limited; Old R parietal infarct, vessels okay     Impression:  R parietal infarction,  likely 2/2 AF     Recommendations:   - s/p bx 9/8, back on DOAC, eliquis 5mg BID for AF    - stroke is now at least subacute appearing.  can likely start titrating BP to normotension.  vessel imaging MRA H/N to help determine if any athero. if any severe stenosis may need to avoid hypotension   - High dose statin therapy - atorvastatin 40mg PO daily. LDL goal <70mg/dL.  - MRI brain w/ and w/o , and MRA H/N pending   - TTE, no need from stroke standpoint   - telemetry  - PT/OT/SS/SLP, OOBC  - check FS, glucose control <180  - GI/DVT ppx  - Thank you for allowing me to participate in the care of this patient. Call with questions.  Stone Mo MD  Vascular Neurology  Office: [phone].

## 2022-09-08 NOTE — PROGRESS NOTE ADULT - PROBLEM SELECTOR PLAN 4
presenting with G6PD hemolysis 2/2 rasburicase (low hapto, high LDH/alk/bili) s/p rasburicase for TLS on 8/23 w/hgb 5 on 8/26 (hgb 9 on 8/23)  - pt s/p total 4 units pRBC  - trend hgb q12h for now, transfuse to maintain hgb>7  - G6PD level stable following multiple transfusions   - will repeat if rasburicase given as above Has been confused since admission with improvement today (9/1). As per family, this occurs every time the patient is sick with similar symptoms. Considerations include underlying dementia, delirium, vs metabolic causes vs neurological vs malignancy  - AAOx2  - cultures with no growth to date, no acute toxicologies found on labs, no history of cirrhosis with low suspicion for ammonia induced, no indication for renal failure for uremic causes  - CT head non-contrast performed, no official read but as per radiology has acute on chronic of R parietal lobe infarct with no indication of hemorrhagic transformation  - neuro consult       - MRI brain wo contrast, MRA H wo contrast, MRA N w contrast done- Old right parietal infarct. No acute infarcts. Normal intracranial circulation, limited MRA of the neck due to motion.      - asa, atorvastatin 80mg      - TTE with severe global left ventricular systolic dysfunction, no evidence of thrombi

## 2022-09-08 NOTE — PROGRESS NOTE ADULT - PROBLEM SELECTOR PLAN 4
Patient found to have acute-subacute stroke   - Defer pharmacologic stress test given stroke; obtain neuro clearance.

## 2022-09-08 NOTE — PROGRESS NOTE ADULT - ATTENDING COMMENTS
73yo M w/ extensive PMHx including DLBCL, G6PD deficiency, complete heart block s/p PPM, HFrEF (new EF 20%), CKD 3b, paroxysmal atrial fibrillation, treated for TLS syndrome w/ rasburicase, complicated by acute hemolytic anemia in setting of G6PD deficiency. Patient also p/w acute hypoxic respiratory failure due to heart failure exacerbation requiring bipap initially. He was eventually titrated off bipap and nasal cannula, now breathing comfortably on room air after diuresis.    S/p excisional biopsy of back lesion, postop pain improving.     Plan for Obinutuzumab tomorrow, d/c planning after.

## 2022-09-08 NOTE — PROGRESS NOTE ADULT - PROBLEM SELECTOR PLAN 1
-C/w Entresto 49-51 BID  -increase metoprolol to 75 mg ER   -Restart aldactone at 25 mg if okay with heme onc

## 2022-09-08 NOTE — PROGRESS NOTE ADULT - PROBLEM SELECTOR PLAN 6
History of HFrEF EF 45%, diffuse LV hypokinesis, mod pulmHTN; followed by cardiology outpatient, elevated proBNP with uptrending troponins, now downtrending. TTE (8/27) ef=20%, severe MR, mild LV enlargement, normal RA, RV enlargement with decreased RVSF, mild-mod tricuspid regurg, moderate pulm pressures, b/l pleural effusions   - c/w metoprolol tartrate 12.5mg BID (for discharge consider metoprolol succinate vs. resuming home Carvedilol at lower dose as per Cards)  - Entresto 49/51 Na at 151->149->147, uptrend from prior 140s, could be related to volume depletion iso diuresis vs DI iso recent neuro event  - serum osm yesterday was 333, urine osm 399  - hyperosmolar hypernatremia likely iso diuretic use, improvement today with downtrending Na, c/w monitor  - resolved

## 2022-09-08 NOTE — PROGRESS NOTE ADULT - PROBLEM SELECTOR PLAN 5
Na at 151->149->147, uptrend from prior 140s, could be related to volume depletion iso diuresis vs DI iso recent neuro event  - serum osm yesterday was 333, urine osm 399  - hyperosmolar hypernatremia likely iso diuretic use, improvement today with downtrending Na, c/w monitor  - resolved presenting with G6PD hemolysis 2/2 rasburicase (low hapto, high LDH/alk/bili) s/p rasburicase for TLS on 8/23 w/hgb 5 on 8/26 (hgb 9 on 8/23)  - pt s/p total 4 units pRBC  - trend hgb q12h for now, transfuse to maintain hgb>7  - G6PD level stable following multiple transfusions   - will repeat if rasburicase given as above

## 2022-09-08 NOTE — CHART NOTE - NSCHARTNOTEFT_GEN_A_CORE
Nutrition Follow Up Note  Patient seen for: malnutrition initial follow-up. Chart reviewed, events noted.     "72 year old male with significant history of HTN, CKD stage II, complete heart block (PPM in place), HLD, HFrEF (45% in 2019), and DLBCL (tx with R-CHOP in , with relapse in  treated with BR) now with recently diagnosed grade 3 follicular lymphoma on  who presents with rasburicase associated G6PD hemolytic anemia in setting of TLS on  and AHRF."    Source: [x] Patient       [x] Medical Record        [] RN        [] Family at bedside       [] Other:    -If unable to interview patient: [] Trach/Vent/BiPAP  [] Disoriented/confused/inappropriate to interview    Diet Order:   Diet, DASH/TLC:   Sodium & Cholesterol Restricted  Consistent Carbohydrate {Evening Snack} (CSTCHOSN)  Supplement Feeding Modality:  Oral  Glucerna Shake Cans or Servings Per Day:  1       Frequency:  Daily (22)    - Is current order appropriate/adequate? [x] Yes  []  No:     - PO intake :   [] >75%  Adequate    [x] 50-75%  Fair       [] <50%  Poor    - Nutrition-related concerns:      - Pt reports fair appetite/PO intake. Per flowsheets, pt with variable but overall fair PO intake. Reports he hasn't tried the Glucernas or diet Mighty Shakes yet but is going to.      - Per chart: s/p excisional biopsy of back lesion . Pt initiated on chemotherapy .      - Per chart: pt is ordered for admelog SSI, atorvastatin, and a multivitamin in-house.    GI: Denies nausea, vomiting, constipation, diarrhea.  Last BM  per pt.   Bowel Regimen? [] Yes   [x] No      Weights:   Daily Weight in k (), 67.9 (), 68.3 (), 68 (), 67.1 (), 71.1 ()  Dosing wt: 76.7 kg ()  Wt loss noted, likely in setting of fluid shifts as pt previously ordered for diuretics. RD to continue to monitor weight trends as able/available.     MEDICATIONS  (STANDING):  acyclovir   Oral Tab/Cap 400 milliGRAM(s) Oral two times a day  allopurinol 100 milliGRAM(s) Oral daily  apixaban 5 milliGRAM(s) Oral two times a day  atorvastatin 40 milliGRAM(s) Oral at bedtime  chlorhexidine 2% Cloths 1 Application(s) Topical daily  chlorhexidine 4% Liquid 1 Application(s) Topical <User Schedule>  dextrose 5%. 1000 milliLiter(s) (100 mL/Hr) IV Continuous <Continuous>  dextrose 5%. 1000 milliLiter(s) (50 mL/Hr) IV Continuous <Continuous>  dextrose 50% Injectable 25 Gram(s) IV Push once  dextrose 50% Injectable 12.5 Gram(s) IV Push once  dextrose 50% Injectable 25 Gram(s) IV Push once  glucagon  Injectable 1 milliGRAM(s) IntraMuscular once  insulin lispro (ADMELOG) corrective regimen sliding scale   SubCutaneous Before meals and at bedtime  metoprolol succinate ER 50 milliGRAM(s) Oral daily  multivitamin 1 Tablet(s) Oral daily  sacubitril 49 mG/valsartan 51 mG 1 Tablet(s) Oral every 12 hours    MEDICATIONS  (PRN):  dextrose Oral Gel 15 Gram(s) Oral once PRN Blood Glucose LESS THAN 70 milliGRAM(s)/deciliter  melatonin 3 milliGRAM(s) Oral at bedtime PRN Insomnia  sodium chloride 0.9% lock flush 10 milliLiter(s) IV Push every 1 hour PRN Pre/post blood products, medications, blood draw, and to maintain line patency  traMADol 25 milliGRAM(s) Oral every 4 hours PRN Moderate Pain (4 - 6)  traMADol 50 milliGRAM(s) Oral every 4 hours PRN Severe Pain (7 - 10)      Pertinent Labs:    @ 06:54: Na 144, BUN 32<H>, Cr 0.92, BG 93, K+ 3.7, Phos 2.9, Mg 1.7, Alk Phos 131<H>, ALT/SGPT 25, AST/SGOT 26    A1C with Estimated Average Glucose Result: 5.4 % (22 @ 05:55)    Finger Sticks:  POCT Blood Glucose.: 158 mg/dL ( @ 11:33)  POCT Blood Glucose.: 103 mg/dL ( @ 07:25)  POCT Blood Glucose.: 191 mg/dL (09-07 @ 21:15)  POCT Blood Glucose.: 106 mg/dL ( @ 16:16)  POCT Blood Glucose.: 106 mg/dL ( @ 15:32)      Skin per nursing documentation: No pressure injuries noted.  Edema per nursing documentation: none noted    Estimated Needs:   [x] no change since previous assessment  30-35 kcal/kg = 8874-4896 kcal/day  1.3-1.5 g/kg = 100-115 g pro/day  fluids per team  based on dosing wt 76.7 kg (-)    Previous Nutrition Diagnosis: Moderate chronic malnutrition; Increased Nutrient Needs  Nutrition Diagnosis is: [x] ongoing  [] resolved [] not applicable   -Being addressed with PO diet, oral nutrition supplements, micronutrient supplementation, and food preferences.     Nutrition Care Plan:  [x] In Progress  [] Achieved  [] Not applicable    New Nutrition Diagnosis: [x] Not applicable    Nutrition Interventions:     Education Provided   [x] Yes: Discussed importance of adequate protein-energy consumption to meet estimated nutrient needs. Encouraged intake of meals and oral nutrition supplements as tolerated. Encouraged intake of protein-rich foods first at mealtimes to help preserve lean muscle mass. Reviewed food choices that are high in protein. Pt noted with good comprehension and made aware RD remains available for further questions/concerns.      Recommendations:      1) Continue DASH, Consistent Carbohydrate diet.  2) Continue Glucerna 1x/day and multivitamin, pending no medical contraindications.    3) Monitor PO intake, GI tolerance, skin integrity, labs, weight, and bowel movement regularity.   4) Honor food preferences as feasible. Assist with meals PRN and encourage PO intake.  5) malnutrition alert in chart     Monitoring and Evaluation:   Continue to monitor nutritional intake, tolerance to diet prescription, weights, labs, skin integrity    RD remains available upon request and will follow up per protocol  Hilda Peterson, MORTEZA, CDN Pager #[phone]

## 2022-09-08 NOTE — PROGRESS NOTE ADULT - ATTENDING COMMENTS
agree with above  increase metoprolol  SBP > 110 can increase entresto   K has been normal, if risk of hyperkalemia is low (please clear with oncology) can resume spironolactone  cardiology follow up on D/C

## 2022-09-08 NOTE — PROGRESS NOTE ADULT - PROBLEM SELECTOR PLAN 9
DIET: Dash/TLC  DVT Prophylaxis: Holding iso recent surgery will restart upon reevaluation by surgery in AM.  Dispo: Outpatient PT

## 2022-09-09 LAB
ANION GAP SERPL CALC-SCNC: 10 MMOL/L — SIGNIFICANT CHANGE UP (ref 5–17)
BUN SERPL-MCNC: 23 MG/DL — SIGNIFICANT CHANGE UP (ref 7–23)
CALCIUM SERPL-MCNC: 8.8 MG/DL — SIGNIFICANT CHANGE UP (ref 8.4–10.5)
CHLORIDE SERPL-SCNC: 107 MMOL/L — SIGNIFICANT CHANGE UP (ref 96–108)
CO2 SERPL-SCNC: 25 MMOL/L — SIGNIFICANT CHANGE UP (ref 22–31)
CREAT SERPL-MCNC: 0.93 MG/DL — SIGNIFICANT CHANGE UP (ref 0.5–1.3)
EGFR: 88 ML/MIN/1.73M2 — SIGNIFICANT CHANGE UP
GLUCOSE BLDC GLUCOMTR-MCNC: 111 MG/DL — HIGH (ref 70–99)
GLUCOSE BLDC GLUCOMTR-MCNC: 157 MG/DL — HIGH (ref 70–99)
GLUCOSE BLDC GLUCOMTR-MCNC: 220 MG/DL — HIGH (ref 70–99)
GLUCOSE BLDC GLUCOMTR-MCNC: 97 MG/DL — SIGNIFICANT CHANGE UP (ref 70–99)
GLUCOSE SERPL-MCNC: 93 MG/DL — SIGNIFICANT CHANGE UP (ref 70–99)
HCT VFR BLD CALC: 31.2 % — LOW (ref 39–50)
HGB BLD-MCNC: 9.1 G/DL — LOW (ref 13–17)
LDH SERPL L TO P-CCNC: 233 U/L — SIGNIFICANT CHANGE UP (ref 50–242)
MAGNESIUM SERPL-MCNC: 1.6 MG/DL — SIGNIFICANT CHANGE UP (ref 1.6–2.6)
MCHC RBC-ENTMCNC: 25.6 PG — LOW (ref 27–34)
MCHC RBC-ENTMCNC: 29.2 GM/DL — LOW (ref 32–36)
MCV RBC AUTO: 87.9 FL — SIGNIFICANT CHANGE UP (ref 80–100)
NRBC # BLD: 0 /100 WBCS — SIGNIFICANT CHANGE UP (ref 0–0)
PHOSPHATE SERPL-MCNC: 2.3 MG/DL — LOW (ref 2.5–4.5)
PLATELET # BLD AUTO: 151 K/UL — SIGNIFICANT CHANGE UP (ref 150–400)
POTASSIUM SERPL-MCNC: 3.6 MMOL/L — SIGNIFICANT CHANGE UP (ref 3.5–5.3)
POTASSIUM SERPL-SCNC: 3.6 MMOL/L — SIGNIFICANT CHANGE UP (ref 3.5–5.3)
RBC # BLD: 3.55 M/UL — LOW (ref 4.2–5.8)
RBC # FLD: 19 % — HIGH (ref 10.3–14.5)
SODIUM SERPL-SCNC: 142 MMOL/L — SIGNIFICANT CHANGE UP (ref 135–145)
URATE SERPL-MCNC: 6.4 MG/DL — SIGNIFICANT CHANGE UP (ref 3.4–8.8)
WBC # BLD: 7.79 K/UL — SIGNIFICANT CHANGE UP (ref 3.8–10.5)
WBC # FLD AUTO: 7.79 K/UL — SIGNIFICANT CHANGE UP (ref 3.8–10.5)

## 2022-09-09 PROCEDURE — 99233 SBSQ HOSP IP/OBS HIGH 50: CPT | Mod: GC

## 2022-09-09 PROCEDURE — 99232 SBSQ HOSP IP/OBS MODERATE 35: CPT | Mod: GC

## 2022-09-09 RX ORDER — DIPHENHYDRAMINE HCL 50 MG
50 CAPSULE ORAL ONCE
Refills: 0 | Status: COMPLETED | OUTPATIENT
Start: 2022-09-09 | End: 2022-09-09

## 2022-09-09 RX ORDER — METOPROLOL TARTRATE 50 MG
1 TABLET ORAL
Qty: 30 | Refills: 0
Start: 2022-09-09 | End: 2022-10-08

## 2022-09-09 RX ORDER — CARVEDILOL PHOSPHATE 80 MG/1
1 CAPSULE, EXTENDED RELEASE ORAL
Qty: 0 | Refills: 0 | DISCHARGE

## 2022-09-09 RX ORDER — DEXAMETHASONE 0.5 MG/5ML
20 ELIXIR ORAL ONCE
Refills: 0 | Status: COMPLETED | OUTPATIENT
Start: 2022-09-09 | End: 2022-09-09

## 2022-09-09 RX ORDER — ACETAMINOPHEN 500 MG
650 TABLET ORAL ONCE
Refills: 0 | Status: COMPLETED | OUTPATIENT
Start: 2022-09-09 | End: 2022-09-09

## 2022-09-09 RX ORDER — SPIRONOLACTONE 25 MG/1
1 TABLET, FILM COATED ORAL
Qty: 30 | Refills: 0
Start: 2022-09-09 | End: 2022-10-08

## 2022-09-09 RX ORDER — IVABRADINE 7.5 MG/1
1 TABLET, FILM COATED ORAL
Qty: 0 | Refills: 0 | DISCHARGE

## 2022-09-09 RX ORDER — SACUBITRIL AND VALSARTAN 24; 26 MG/1; MG/1
1 TABLET, FILM COATED ORAL
Qty: 30 | Refills: 2
Start: 2022-09-09 | End: 2022-12-07

## 2022-09-09 RX ORDER — ALLOPURINOL 300 MG
1 TABLET ORAL
Qty: 30 | Refills: 1
Start: 2022-09-09 | End: 2022-11-07

## 2022-09-09 RX ORDER — ACYCLOVIR SODIUM 500 MG
1 VIAL (EA) INTRAVENOUS
Qty: 60 | Refills: 0
Start: 2022-09-09 | End: 2022-10-08

## 2022-09-09 RX ORDER — OBINUTUZUMAB 1000 MG/40ML
1000 INJECTION, SOLUTION, CONCENTRATE INTRAVENOUS ONCE
Refills: 0 | Status: COMPLETED | OUTPATIENT
Start: 2022-09-09 | End: 2022-09-09

## 2022-09-09 RX ORDER — SPIRONOLACTONE 25 MG/1
1 TABLET, FILM COATED ORAL
Qty: 30 | Refills: 1
Start: 2022-09-09 | End: 2022-11-07

## 2022-09-09 RX ORDER — ALLOPURINOL 300 MG
1 TABLET ORAL
Qty: 30 | Refills: 0
Start: 2022-09-09 | End: 2022-10-08

## 2022-09-09 RX ORDER — POTASSIUM CHLORIDE 20 MEQ
40 PACKET (EA) ORAL ONCE
Refills: 0 | Status: COMPLETED | OUTPATIENT
Start: 2022-09-09 | End: 2022-09-09

## 2022-09-09 RX ORDER — METOPROLOL TARTRATE 50 MG
100 TABLET ORAL DAILY
Refills: 0 | Status: DISCONTINUED | OUTPATIENT
Start: 2022-09-10 | End: 2022-09-12

## 2022-09-09 RX ORDER — MAGNESIUM SULFATE 500 MG/ML
2 VIAL (ML) INJECTION ONCE
Refills: 0 | Status: COMPLETED | OUTPATIENT
Start: 2022-09-09 | End: 2022-09-09

## 2022-09-09 RX ADMIN — SACUBITRIL AND VALSARTAN 1 TABLET(S): 24; 26 TABLET, FILM COATED ORAL at 05:11

## 2022-09-09 RX ADMIN — Medication 1: at 12:25

## 2022-09-09 RX ADMIN — Medication 2: at 21:43

## 2022-09-09 RX ADMIN — CHLORHEXIDINE GLUCONATE 1 APPLICATION(S): 213 SOLUTION TOPICAL at 12:26

## 2022-09-09 RX ADMIN — OBINUTUZUMAB 1000 MILLIGRAM(S): 1000 INJECTION, SOLUTION, CONCENTRATE INTRAVENOUS at 17:16

## 2022-09-09 RX ADMIN — SACUBITRIL AND VALSARTAN 1 TABLET(S): 24; 26 TABLET, FILM COATED ORAL at 16:36

## 2022-09-09 RX ADMIN — ATORVASTATIN CALCIUM 40 MILLIGRAM(S): 80 TABLET, FILM COATED ORAL at 21:43

## 2022-09-09 RX ADMIN — Medication 75 MILLIGRAM(S): at 05:17

## 2022-09-09 RX ADMIN — CHLORHEXIDINE GLUCONATE 1 APPLICATION(S): 213 SOLUTION TOPICAL at 05:17

## 2022-09-09 RX ADMIN — Medication 40 MILLIEQUIVALENT(S): at 08:57

## 2022-09-09 RX ADMIN — Medication 650 MILLIGRAM(S): at 16:18

## 2022-09-09 RX ADMIN — Medication 1 TABLET(S): at 12:26

## 2022-09-09 RX ADMIN — APIXABAN 5 MILLIGRAM(S): 2.5 TABLET, FILM COATED ORAL at 05:16

## 2022-09-09 RX ADMIN — Medication 110 MILLIGRAM(S): at 16:18

## 2022-09-09 RX ADMIN — Medication 50 MILLIGRAM(S): at 16:18

## 2022-09-09 RX ADMIN — Medication 100 MILLIGRAM(S): at 12:26

## 2022-09-09 RX ADMIN — Medication 400 MILLIGRAM(S): at 16:37

## 2022-09-09 RX ADMIN — SPIRONOLACTONE 25 MILLIGRAM(S): 25 TABLET, FILM COATED ORAL at 05:16

## 2022-09-09 RX ADMIN — Medication 25 GRAM(S): at 08:58

## 2022-09-09 RX ADMIN — Medication 400 MILLIGRAM(S): at 05:11

## 2022-09-09 RX ADMIN — APIXABAN 5 MILLIGRAM(S): 2.5 TABLET, FILM COATED ORAL at 16:36

## 2022-09-09 NOTE — PROGRESS NOTE ADULT - ATTENDING COMMENTS
The patient received IV obinutuzumab therapy today without side effects. He is stable post resection of the skin lesion; no bleeding is noted. From our perspective he may be followed at [facility] to see Dr Cordova. He may be discharged when he is medically stable

## 2022-09-09 NOTE — PROGRESS NOTE ADULT - ATTENDING COMMENTS
71yo M w/ extensive PMHx including DLBCL, G6PD deficiency, complete heart block s/p PPM, HFrEF (new EF 20%), CKD 3b, paroxysmal atrial fibrillation, treated for TLS syndrome w/ rasburicase, complicated by acute hemolytic anemia in setting of G6PD deficiency. Patient also p/w acute hypoxic respiratory failure due to heart failure exacerbation requiring bipap initially. He was eventually titrated off bipap and nasal cannula, now breathing comfortably on room air after diuresis.    S/p excisional biopsy of back lesion.     Given Obinutuzumab today.    D/c home w f/u. D/c time 45 mins.

## 2022-09-09 NOTE — PROGRESS NOTE ADULT - PROBLEM SELECTOR PLAN 4
Has been confused since admission with improvement today (9/1). As per family, this occurs every time the patient is sick with similar symptoms. Considerations include underlying dementia, delirium, vs metabolic causes vs neurological vs malignancy  - AAOx2  - cultures with no growth to date, no acute toxicologies found on labs, no history of cirrhosis with low suspicion for ammonia induced, no indication for renal failure for uremic causes  - CT head non-contrast performed, no official read but as per radiology has acute on chronic of R parietal lobe infarct with no indication of hemorrhagic transformation  - neuro consult       - MRI brain wo contrast, MRA H wo contrast, MRA N w contrast done- Old right parietal infarct. No acute infarcts. Normal intracranial circulation, limited MRA of the neck due to motion.      - asa, atorvastatin 80mg      - TTE with severe global left ventricular systolic dysfunction, no evidence of thrombi

## 2022-09-09 NOTE — PROGRESS NOTE ADULT - PROBLEM SELECTOR PLAN 3
heme/onc      - s/p 1 session chemotherapy: TLS labs significant for uric acid 8.2, phosphorus 5.6      - s/p 2 sessions chemotherapy: TLS labs significant for uric acid 8.3, phosphorus 5.4 s/p sevelamer       - s/p 3 sessions chemotherapy: TLS labs significant for uric acid 9.1, phosphorus 2.9      - most recent G6PD level 11.1 (wnl) following multiple transfusions of pRBC      - patient is at high risk for TLS but still requires chemotherapy iso lymphoma, comfortable to give rasburicase if needed with improved G6PD levels --> as per heme/onc if uric acid > 10, contact fellow first prior to 3mg rasburicase      - 9/4 AM with uric acid 9.4, phos 1.6, will d/c sevelamer   - on allopurinol 100mg  - uric acid still under threshold set by heme/onc will c/w monitor for now, if uptrending will reach out for further recs,  - obinutuzumab on 9/9

## 2022-09-09 NOTE — PROGRESS NOTE ADULT - PROBLEM SELECTOR PLAN 1
- h/o grade 3 follicular lymphoma, pt was pending surgical biopsy on 8/23, however in heme/onc outpt labs s/f TLS with uric acid 13.7, pt was started rasburicase on 8/23 and developed rasburicase-triggered G6PD hemolysis with hgb 5.7 on 8/26  - UA 6.4 from 13.7 s/p rasburicase on 8/23, now on allopurinol 100mg   - follow-up with heme:      - Started prednisone 100mg daily x5 (day 4/5) days with intent to start Obinutuzumab-COEP inpatient 9/1      - PICC placed (8/30) however patient removed it, PICC placed again (8/31) and ready for chemotherapy initiation      - trend TLS labs (CMP, Mg, Phos, LDH, Uric Acid) q12hrs as patient is high risk at 6am and 6pm daily      - s/p 1st session 9/1, pending 2nd session 9/2  - s/p excisional biopsy 9/7.  - obinutuzumab on 9/9

## 2022-09-09 NOTE — PROGRESS NOTE ADULT - SUBJECTIVE AND OBJECTIVE BOX
PROGRESS NOTE:   Authored by Dave Vanegas MD   Patient is a 72y old  Male who presents with a chief complaint of lymphoma (07 Sep 2022 07:25)      SUBJECTIVE / OVERNIGHT EVENTS: No acute events overnight. No chest pain,  palpitations, dyspnea, abdominal pain. nausea, vomiting, diarrhea, blurry vision, dysuria, lightheadedness, dizziness.    ADDITIONAL REVIEW OF SYSTEMS:    MEDICATIONS  (STANDING):  acyclovir   Oral Tab/Cap 400 milliGRAM(s) Oral two times a day  allopurinol 100 milliGRAM(s) Oral daily  apixaban 5 milliGRAM(s) Oral two times a day  atorvastatin 40 milliGRAM(s) Oral at bedtime  chlorhexidine 2% Cloths 1 Application(s) Topical daily  chlorhexidine 4% Liquid 1 Application(s) Topical <User Schedule>  dextrose 5%. 1000 milliLiter(s) (100 mL/Hr) IV Continuous <Continuous>  dextrose 5%. 1000 milliLiter(s) (50 mL/Hr) IV Continuous <Continuous>  dextrose 50% Injectable 25 Gram(s) IV Push once  dextrose 50% Injectable 12.5 Gram(s) IV Push once  dextrose 50% Injectable 25 Gram(s) IV Push once  glucagon  Injectable 1 milliGRAM(s) IntraMuscular once  insulin lispro (ADMELOG) corrective regimen sliding scale   SubCutaneous Before meals and at bedtime  metoprolol succinate ER 75 milliGRAM(s) Oral daily  multivitamin 1 Tablet(s) Oral daily  sacubitril 49 mG/valsartan 51 mG 1 Tablet(s) Oral every 12 hours  spironolactone 25 milliGRAM(s) Oral daily    MEDICATIONS  (PRN):  dextrose Oral Gel 15 Gram(s) Oral once PRN Blood Glucose LESS THAN 70 milliGRAM(s)/deciliter  melatonin 3 milliGRAM(s) Oral at bedtime PRN Insomnia  sodium chloride 0.9% lock flush 10 milliLiter(s) IV Push every 1 hour PRN Pre/post blood products, medications, blood draw, and to maintain line patency  traMADol 25 milliGRAM(s) Oral every 4 hours PRN Moderate Pain (4 - 6)  traMADol 50 milliGRAM(s) Oral every 4 hours PRN Severe Pain (7 - 10)      CAPILLARY BLOOD GLUCOSE      POCT Blood Glucose.: 137 mg/dL (08 Sep 2022 20:58)  POCT Blood Glucose.: 104 mg/dL (08 Sep 2022 16:15)  POCT Blood Glucose.: 158 mg/dL (08 Sep 2022 11:33)  POCT Blood Glucose.: 103 mg/dL (08 Sep 2022 07:25)    I&O's Summary    08 Sep 2022 07:01  -  09 Sep 2022 07:00  --------------------------------------------------------  IN: 200 mL / OUT: 200 mL / NET: 0 mL        PHYSICAL EXAM:  Vital Signs Last 24 Hrs  T(C): 36.6 (09 Sep 2022 04:15), Max: 36.8 (08 Sep 2022 11:10)  T(F): 97.9 (09 Sep 2022 04:15), Max: 98.3 (08 Sep 2022 11:10)  HR: 97 (09 Sep 2022 04:15) (90 - 97)  BP: 138/72 (09 Sep 2022 04:15) (99/58 - 138/72)  BP(mean): --  RR: 18 (09 Sep 2022 04:15) (17 - 18)  SpO2: 99% (09 Sep 2022 04:15) (99% - 100%)    Parameters below as of 09 Sep 2022 04:15  Patient On (Oxygen Delivery Method): room air          GENERAL: No apparent distress.   HEAD:  Atraumatic, Normocephalic  EYES: EOMI, PERRLA, conjunctiva and sclera clear  NECK: Supple, no lymphadenopathy, no JVD  CHEST/LUNG: Clear to auscultation bilateral and symmetric; No wheezes, rales, or rhonchi  HEART: S1 normal, loud S2. Regular rate and rhythm; No murmurs, rubs, or gallops  ABDOMEN: Soft, non-tender, non-distended; normal bowel sounds  EXTREMITIES:  2+ peripheral pulses b/l, No clubbing, cyanosis, or edema  NEUROLOGY: A&O x 2, no focal deficits  SKIN: No rashes or lesions. Dressing intact, clean.      LABS:                        9.9    10.25 )-----------( 169      ( 08 Sep 2022 06:55 )             33.2     09-08    144  |  108  |  32<H>  ----------------------------<  93  3.7   |  24  |  0.92    Ca    9.1      08 Sep 2022 06:54  Phos  2.9     09-08  Mg     1.7     09-08    TPro  6.7  /  Alb  3.3  /  TBili  0.8  /  DBili  x   /  AST  26  /  ALT  25  /  AlkPhos  131<H>  09-08                RADIOLOGY & ADDITIONAL TESTS:  Lab Results Reviewed   Imaging Reviewed  Electrocardiogram Reviewed

## 2022-09-09 NOTE — PROGRESS NOTE ADULT - PROBLEM SELECTOR PLAN 2
History of HFrEF EF 45%, diffuse LV hypokinesis, mod pulmHTN; followed by cardiology outpatient, elevated proBNP with uptrending troponins, now downtrending. TTE (8/27) ef=20%, severe MR, mild LV enlargement, normal RA, RV enlargement with decreased RVSF, mild-mod tricuspid regurg, moderate pulm pressures, b/l pleural effusions   - metoprolol succinate 75 mg PO QD  - Entresto 49/51 PO BID  - spironolactone 25 mg PO QD

## 2022-09-09 NOTE — PROGRESS NOTE ADULT - ASSESSMENT
72 year old male with HTN, CKD stage II, complete heart block (PPM in place), HLD, HFrEF (45% in 2019), and DLBCL (tx with R-CHOP in 2003, with relapse in 2009 treated with BR) now with recently diagnosed grade 3 follicular lymphoma on 8/23 who presents with anemia and SOB, patient was pending a surgical biopsy on 8/25. Admitted for TLS and rasburicase-triggered G6PD hemolytic anemia. Given that patient has developed confusion this admission and is now AOx1, primary team obtained a CTH, which shows acute/subacute R parietal infarction (larger in size when compared with 5/2019), chronic lacunar infarct in R basal ganglia, mild chronic white matter microvascular disease, no hemorrhagic transformation. Denies headache, weakness, numbness, dizziness, visual disturbances. NIHSS 2-did not know month and age. Of note, patient has had multiple prior episodes of confusion and cognitive difficulties.     CTH, which shows acute/subacute R parietal infarction, chronic R BG infarct noted   LDL 81   A1c 5.4   s/p excisional bx 9/7   MRI/A brain done: limited; Old R parietal infarct, vessels okay     Impression:  R parietal infarction,  likely 2/2 AF     Recommendations:   - s/p bx 9/8, back on DOAC, eliquis 5mg BID for AF    - BP normotension  - heme/onc wants to hold patient for obinutuzumab Friday   - High dose statin therapy - atorvastatin 40mg PO daily. LDL goal <70mg/dL.  - TTE, no need from stroke standpoint   - telemetry  - PT/OT/SS/SLP, OOBC  - check FS, glucose control <180  - GI/DVT ppx  - Thank you for allowing me to participate in the care of this patient. Call with questions.  Stone Mo MD  Vascular Neurology  Office: [phone].

## 2022-09-09 NOTE — PROGRESS NOTE ADULT - PROBLEM SELECTOR PLAN 9
DIET: Dash/TLC  DVT Prophylaxis: Holding iso recent surgery will restart upon reevaluation by surgery in AM.  Dispo: Outpatient PT DIET: Dash/TLC  DVT Prophylaxis: Eliquis 5 mg BID  Dispo: Outpatient PT

## 2022-09-09 NOTE — ADVANCED PRACTICE NURSE CONSULT - ASSESSMENT
Pt with day 1/1 tx cycle 1, labs noted in sunrise, okay to proceed with tx as per MD Engle.  Pt with right double lumen picc + blood return noted, no pain, redness or swelling noted at site.  Pt premedicated as noted in sunrise.  Pt administered Obinutuzumab 1000 mg iv over 4 hours as per protocol.  Reinforced with pt and family at bedside immunotherapy regimen and signs and symptoms to report to rn and staff, both verbalized understanding.  Emotional support provided.  Report given to area rn.

## 2022-09-09 NOTE — PROGRESS NOTE ADULT - ASSESSMENT
72M hx DLBCL (tx with R-CHOP in 2003, with relapse in 2009 treated with BR) now with multiple areas of palpable lymphadenopathy with biopsies shown to be at least grade IIIA follicular lymphoma. Has had significant weight loss >20lbs in the last 6 months. Also noted to have an IgG lambda, IgG Kappa, and IgM Lambda monoclonal gammopathies. Prior to being sent to the ED his labs were suggestive of TLS with UA 13.7 s/p 3mg rasburicase on 8/23.      #Follicular Lymphoma  #Hemolytic Anemia  -Follows with Dr. Cordova at [facility].   -Originally diagnosed in 2003 s/p RCHOP with relapse in 2009 s/p BR. Recent outpatient PET/CT 8/2022 showed concern for POD with new LAD and subcutaneous tissue nodules  ---s/p FNA L cervical LN in June 2022 with path c/w grade 3A follicular lymphoma positive for BCL6 (3q27) breakpoint translocation and negative for MYC Rearrangement or BCL2-IGH gene rearrangement [translocation t(14;18) present in ~ 85% of FL].   ---Planned for excisional biopsy outpatient to confirm suspected 3B FL vs. transformation, but unable to be completed due to current admission. Patient presented with spontaneous TLS, now requiring inpatient treatment. Ideally, we would require an excisional biopsy to confirm the suspected diagnosis. However, patient was not optimized medically for an excisional biopsy inpatient prior to treatment, so we treated for a presumptive diagnosis of relapsed FL with regimen below so as not to delay treatment:  -Patient is on treatment with a modified regimen of mini-COEP plus Obinutuzumab. C1 Started on 9/1/22 (cycle length q21 days).   ------Cyclophosphamide 400 mg/m² on D1 (9/1)  ------Etoposide 40% dose reduced to 30 mg/m2 IV on D1-D3 of each cycle (9/1-9/3)  ------Vincristine 2mg (flat dose) on D1 (9/1)  ------Prednisone 100mg PO daily x5 days (started on 8/29-9/2)  ------Obinutuzumab 100mg test dose on D1 + 900mg full dose on D2 (9/1, (9/2)  -s/p excisional biopsy of back lesion on 9/7--pending  -We will plan to treat with weekly Obinutuzumab for C1. Patient will receive Obinutuzumab 1000mg on 9/9. We have no contraindication to discharge following obinutuzumab administration.    #G6PD Deficiency  #Concern for TLS  -Patient is G6PD Deficient (G6PD 4.8) and is very high risk for TLS in the s/o spontaneous TLS on admission. He may require further doses of Rasburicase which can cause hemolytic anemia in the setting of G6PD Deficiency  -He is s/p rasburicase on 8/23 outpatient with development of hemolytic anemia and is now s/p 4u pRBC on this admission--repeat G6PD on 8/27 following transfusions now 11.1  -As we are initiating treatment today, we are anticipating patient may develop TLS and we may have to initiate HD as we cannot safely administer rasburicase  -Please check TLS labs BID. Please ensure to check CMP, Mg, Phos, LDH, Uric acid.  ---If Uric Acid is >10, please give 3mg Rasburicase. If Rasburicase is given, please check CBC BID as patient is high risk for hemolysis. Please contact the Hematology fellow on call prior to administration of Rasburicase. Please call the Hematology fellow on call if there are any concerning findings on the TLS labs.  -Nephrology consulted. Appreciate recommendations    Brittaney Ambrosio MD  Hematology/Oncology Fellow, PGY-6  Pager: [phone]  After 5pm and on weekends please page on-call fellow

## 2022-09-09 NOTE — PROGRESS NOTE ADULT - SUBJECTIVE AND OBJECTIVE BOX
INTERVAL HPI/OVERNIGHT EVENTS:  Patient S&E at bedside. No o/n events,     VITAL SIGNS:  T(F): 97.9 (09-09-22 @ 04:15)  HR: 97 (09-09-22 @ 04:15)  BP: 138/72 (09-09-22 @ 04:15)  RR: 18 (09-09-22 @ 04:15)  SpO2: 99% (09-09-22 @ 04:15)  Wt(kg): --    PHYSICAL EXAM:    Constitutional: NAD  Eyes: EOMI, sclera non-icteric  Neck: supple  Respiratory: CTAB, no wheezes or crackles   Cardiovascular: RRR  Gastrointestinal: soft, NTND, + BS  Extremities: no cyanosis, clubbing or edema   Neurological: awake and alert      MEDICATIONS  (STANDING):  acyclovir   Oral Tab/Cap 400 milliGRAM(s) Oral two times a day  allopurinol 100 milliGRAM(s) Oral daily  apixaban 5 milliGRAM(s) Oral two times a day  atorvastatin 40 milliGRAM(s) Oral at bedtime  chlorhexidine 2% Cloths 1 Application(s) Topical daily  chlorhexidine 4% Liquid 1 Application(s) Topical <User Schedule>  dextrose 5%. 1000 milliLiter(s) (100 mL/Hr) IV Continuous <Continuous>  dextrose 5%. 1000 milliLiter(s) (50 mL/Hr) IV Continuous <Continuous>  dextrose 50% Injectable 25 Gram(s) IV Push once  dextrose 50% Injectable 12.5 Gram(s) IV Push once  dextrose 50% Injectable 25 Gram(s) IV Push once  glucagon  Injectable 1 milliGRAM(s) IntraMuscular once  insulin lispro (ADMELOG) corrective regimen sliding scale   SubCutaneous Before meals and at bedtime  metoprolol succinate ER 75 milliGRAM(s) Oral daily  multivitamin 1 Tablet(s) Oral daily  sacubitril 49 mG/valsartan 51 mG 1 Tablet(s) Oral every 12 hours  spironolactone 25 milliGRAM(s) Oral daily    MEDICATIONS  (PRN):  dextrose Oral Gel 15 Gram(s) Oral once PRN Blood Glucose LESS THAN 70 milliGRAM(s)/deciliter  melatonin 3 milliGRAM(s) Oral at bedtime PRN Insomnia  sodium chloride 0.9% lock flush 10 milliLiter(s) IV Push every 1 hour PRN Pre/post blood products, medications, blood draw, and to maintain line patency  traMADol 25 milliGRAM(s) Oral every 4 hours PRN Moderate Pain (4 - 6)  traMADol 50 milliGRAM(s) Oral every 4 hours PRN Severe Pain (7 - 10)      Allergies    rasburicase (Other)    Intolerances        LABS:                        9.1    7.79  )-----------( 151      ( 09 Sep 2022 07:16 )             31.2     09-09    142  |  107  |  23  ----------------------------<  93  3.6   |  25  |  0.93    Ca    8.8      09 Sep 2022 07:04  Phos  2.3     09-09  Mg     1.6     09-09    TPro  6.7  /  Alb  3.3  /  TBili  0.8  /  DBili  x   /  AST  26  /  ALT  25  /  AlkPhos  131<H>  09-08          RADIOLOGY & ADDITIONAL TESTS:  Studies reviewed.

## 2022-09-09 NOTE — PROGRESS NOTE ADULT - SUBJECTIVE AND OBJECTIVE BOX
Neurology Progress Note    S: Patient seen and examined. No new events overnight.  s/p bx this week     Medication:    MEDICATIONS  (STANDING):  acyclovir   Oral Tab/Cap 400 milliGRAM(s) Oral two times a day  allopurinol 100 milliGRAM(s) Oral daily  apixaban 5 milliGRAM(s) Oral two times a day  atorvastatin 40 milliGRAM(s) Oral at bedtime  chlorhexidine 2% Cloths 1 Application(s) Topical daily  chlorhexidine 4% Liquid 1 Application(s) Topical <User Schedule>  dextrose 5%. 1000 milliLiter(s) (100 mL/Hr) IV Continuous <Continuous>  dextrose 5%. 1000 milliLiter(s) (50 mL/Hr) IV Continuous <Continuous>  dextrose 50% Injectable 25 Gram(s) IV Push once  dextrose 50% Injectable 12.5 Gram(s) IV Push once  dextrose 50% Injectable 25 Gram(s) IV Push once  glucagon  Injectable 1 milliGRAM(s) IntraMuscular once  insulin lispro (ADMELOG) corrective regimen sliding scale   SubCutaneous Before meals and at bedtime  metoprolol succinate ER 75 milliGRAM(s) Oral daily  multivitamin 1 Tablet(s) Oral daily  sacubitril 49 mG/valsartan 51 mG 1 Tablet(s) Oral every 12 hours  spironolactone 25 milliGRAM(s) Oral daily    MEDICATIONS  (PRN):  dextrose Oral Gel 15 Gram(s) Oral once PRN Blood Glucose LESS THAN 70 milliGRAM(s)/deciliter  melatonin 3 milliGRAM(s) Oral at bedtime PRN Insomnia  sodium chloride 0.9% lock flush 10 milliLiter(s) IV Push every 1 hour PRN Pre/post blood products, medications, blood draw, and to maintain line patency  traMADol 25 milliGRAM(s) Oral every 4 hours PRN Moderate Pain (4 - 6)  traMADol 50 milliGRAM(s) Oral every 4 hours PRN Severe Pain (7 - 10)        Vitals:    Vital Signs Last 24 Hrs  T(C): 36.6 (09 Sep 2022 04:15), Max: 36.8 (08 Sep 2022 11:10)  T(F): 97.9 (09 Sep 2022 04:15), Max: 98.3 (08 Sep 2022 11:10)  HR: 97 (09 Sep 2022 04:15) (90 - 97)  BP: 138/72 (09 Sep 2022 04:15) (99/58 - 138/72)  BP(mean): --  RR: 18 (09 Sep 2022 04:15) (17 - 18)  SpO2: 99% (09 Sep 2022 04:15) (99% - 100%)    Parameters below as of 09 Sep 2022 04:15  Patient On (Oxygen Delivery Method): room air          General Exam:   General Appearance: Appropriately dressed and in no acute distress       Head: Normocephalic, atraumatic and no dysmorphic features  Ear, Nose, and Throat: Moist mucous membranes  CVS: S1S2+  Resp: No SOB, no wheeze or rhonchi  Abd: soft NTND  Extremities: No edema, no cyanosis  Skin: No bruises, no rashes     Neurological Exam:  - Mental Status:  Alert, awake, oriented to person, not place, and time; Speech is fluent with intact naming.   - Cranial Nerves II-XII:    II:  Visual fields are full to confrontation; Pupils are equal, round, and reactive to light  III, IV, VI:  Extraocular movements are intact without nystagmus.  V:  Facial sensation is intact in the V1-V3 distribution bilaterally.  VII:  Face is symmetric with normal eye closure and smile  VIII:  Hearing is intact to finger rub.  IX, X:  Uvula is midline and soft palate rises symmetrically  XI:  Head turning and shoulder shrug are intact.  XII:  Tongue protudes in the midline.  - Motor:  Strength is 5/5 throughout.  There is no pronator drift.  Normal muscle bulk and tone throughout.  - Sensory:  Intact throughout to light touch.  - Coordination:  Finger-nose-finger and heel-knee-shin intact without dysmetria, but b/l intention tremor.   - Gait:   Due to fall risk, did not assess.       I personally reviewed the below data/images/labs:  CBC Full  -  ( 09 Sep 2022 07:16 )  WBC Count : 7.79 K/uL  RBC Count : 3.55 M/uL  Hemoglobin : 9.1 g/dL  Hematocrit : 31.2 %  Platelet Count - Automated : 151 K/uL  Mean Cell Volume : 87.9 fl  Mean Cell Hemoglobin : 25.6 pg  Mean Cell Hemoglobin Concentration : 29.2 gm/dL  Auto Neutrophil # : x  Auto Lymphocyte # : x  Auto Monocyte # : x  Auto Eosinophil # : x  Auto Basophil # : x  Auto Neutrophil % : x  Auto Lymphocyte % : x  Auto Monocyte % : x  Auto Eosinophil % : x  Auto Basophil % : x        142  |  107  |  23  ----------------------------<  93  3.6   |  25  |  0.93    Ca    8.8      09 Sep 2022 07:04  Phos  2.3       Mg     1.6         TPro  6.7  /  Alb  3.3  /  TBili  0.8  /  DBili  x   /  AST  26  /  ALT  25  /  AlkPhos  131<H>        Urinalysis Basic - ( 01 Sep 2022 12:01 )    Color: Light Yellow / Appearance: Clear / S.011 / pH: x  Gluc: x / Ketone: Negative  / Bili: Negative / Urobili: Negative   Blood: x / Protein: Negative / Nitrite: Negative   Leuk Esterase: Negative / RBC: x / WBC x   Sq Epi: x / Non Sq Epi: x / Bacteria: x    Acute/subacute right-sided parietal lobe infarction   superimposed upon a chronic infarct. Findings appear larger in size when   compared with 2019.    No hemorrhagic transformation at this time.    Similar-appearing mild chronic white matter microvascular type changes   and chronic lacunar infarct within the right basal ganglia.  < from: MR Head No Cont (22 @ 13:22) >    ACC: [id number] EXAM:  MR ANGIO NECK                        ACC: [id number] EXAM:  MR BRAIN                        ACC: [id number] EXAM:  MR ANGIO BRAIN                          PROCEDURE DATE:  2022          INTERPRETATION:  CLINICAL INDICATION: Acute on chronic CVA      Magnetic resonance imaging of the brain was carried out with axial   susceptibility weighted series, diffusion weighted series and sagittal T1   weighted series on a 1.5 Nell magnet.    Magnetic resonance angiography of the carotid and vertebral circulation   the neck was obtained using 2D time-of-flight technique. The intracranial   circulation centered the [id number] was evaluated using 3D   time-of-flight technique.    The Comparison is made with the prior brain CT of 2022.      The patient was claustrophobic and only part of the MRA of the neck and   brain MRI were obtained.    The images of the neck are limited by motion. Be further evaluated with   CTA as clinically indicated.    There is mild to moderate atrophy. Small vessel white matter ischemic   changes are noted. There is an old right parietal infarct. No acute   infarcts are identified on diffusion-weighted imaging. There is some   hemosiderin staining in the old parietal infarct..    The neck MRA is limited by motion and internal carotid artery stenosis is   not excluded. The right vertebral artery is dominant.    The distal cervical, petrous cavernous and supraclinoid internal carotid   arteries are normal. The anterior cerebral arteries and anterior   communicating artery are normal. The middle cerebral arteries are normal.   The basilar artery, posterior cerebral arteries and superior cerebellar   arteries are normal. The anterior inferior cerebellar arteries are   normal. Bilateral posterior communicating arteries are visualized.      IMPRESSION: Limited examination as the patient was unable to cooperate.   Old right parietal infarct. No acute infarcts. Normal intracranial   circulation, limited MRA of the neck due to motion.    --- End of Report ---            CAITY MCGUIRE MD; Attending Radiologist  This document has been electronically signed. Sep  6 2022  1:41PM    < end of copied text >

## 2022-09-10 ENCOUNTER — TRANSCRIPTION ENCOUNTER (OUTPATIENT)
Age: 72
End: 2022-09-10

## 2022-09-10 LAB
GLUCOSE BLDC GLUCOMTR-MCNC: 109 MG/DL — HIGH (ref 70–99)
GLUCOSE BLDC GLUCOMTR-MCNC: 129 MG/DL — HIGH (ref 70–99)
GLUCOSE BLDC GLUCOMTR-MCNC: 132 MG/DL — HIGH (ref 70–99)
GLUCOSE BLDC GLUCOMTR-MCNC: 149 MG/DL — HIGH (ref 70–99)

## 2022-09-10 PROCEDURE — 99232 SBSQ HOSP IP/OBS MODERATE 35: CPT

## 2022-09-10 RX ADMIN — SPIRONOLACTONE 25 MILLIGRAM(S): 25 TABLET, FILM COATED ORAL at 12:17

## 2022-09-10 RX ADMIN — Medication 3 MILLIGRAM(S): at 21:21

## 2022-09-10 RX ADMIN — APIXABAN 5 MILLIGRAM(S): 2.5 TABLET, FILM COATED ORAL at 12:17

## 2022-09-10 RX ADMIN — Medication 100 MILLIGRAM(S): at 12:18

## 2022-09-10 RX ADMIN — Medication 100 MILLIGRAM(S): at 12:17

## 2022-09-10 RX ADMIN — SACUBITRIL AND VALSARTAN 1 TABLET(S): 24; 26 TABLET, FILM COATED ORAL at 17:55

## 2022-09-10 RX ADMIN — Medication 400 MILLIGRAM(S): at 17:55

## 2022-09-10 RX ADMIN — Medication 1 MILLIGRAM(S): at 02:30

## 2022-09-10 RX ADMIN — Medication 400 MILLIGRAM(S): at 12:18

## 2022-09-10 RX ADMIN — CHLORHEXIDINE GLUCONATE 1 APPLICATION(S): 213 SOLUTION TOPICAL at 12:17

## 2022-09-10 RX ADMIN — Medication 1 MILLIGRAM(S): at 02:50

## 2022-09-10 RX ADMIN — Medication 1 TABLET(S): at 12:18

## 2022-09-10 RX ADMIN — APIXABAN 5 MILLIGRAM(S): 2.5 TABLET, FILM COATED ORAL at 17:55

## 2022-09-10 RX ADMIN — ATORVASTATIN CALCIUM 40 MILLIGRAM(S): 80 TABLET, FILM COATED ORAL at 21:21

## 2022-09-10 NOTE — PROGRESS NOTE ADULT - ASSESSMENT
72 year old male with HTN, CKD stage II, complete heart block (PPM in place), HLD, HFrEF (45% in 2019), and DLBCL (tx with R-CHOP in 2003, with relapse in 2009 treated with BR) now with recently diagnosed grade 3 follicular lymphoma on 8/23 who presents with anemia and SOB, patient was pending a surgical biopsy on 8/25. Admitted for TLS and rasburicase-triggered G6PD hemolytic anemia. Given that patient has developed confusion this admission and is now AOx1, primary team obtained a CTH, which shows acute/subacute R parietal infarction (larger in size when compared with 5/2019), chronic lacunar infarct in R basal ganglia, mild chronic white matter microvascular disease, no hemorrhagic transformation. Denies headache, weakness, numbness, dizziness, visual disturbances. NIHSS 2-did not know month and age. Of note, patient has had multiple prior episodes of confusion and cognitive difficulties.     CTH, which shows acute/subacute R parietal infarction, chronic R BG infarct noted   LDL 81   A1c 5.4   s/p excisional bx 9/7   MRI/A brain done: limited; Old R parietal infarct, vessels okay   9/10 security called, agitation, tried to leave, given ativan   Impression:  R parietal infarction,  likely 2/2 AF     Recommendations:   - got ativan for agitation.   - monitor QTC. was > 500 avoid antipsychotics   - s/p bx 9/8, back on DOAC, eliquis 5mg BID for AF    - BP normotension  - heme/onc wants to hold patient for obinutuzumab Friday   - High dose statin therapy - atorvastatin 40mg PO daily. LDL goal <70mg/dL.  - TTE, no need from stroke standpoint   - telemetry  - PT/OT/SS/SLP, OOBC  - check FS, glucose control <180  - GI/DVT ppx  - Thank you for allowing me to participate in the care of this patient. Call with questions.  - d/c plan when able   Stone Mo MD  Vascular Neurology  Office: [phone].

## 2022-09-10 NOTE — PROVIDER CONTACT NOTE (OTHER) - NAME OF MD/NP/PA/DO NOTIFIED:
T3 NF Sergio Melara
Team MD Raman Sandoval
Sergio Melara MD Team 3
Team Lori Garcia
Elian Wheeler (Team 3)

## 2022-09-10 NOTE — CHART NOTE - NSCHARTNOTESELECT_GEN_ALL_CORE
Event Note
Event Note
Hematology/Event Note
IR/Event Note
PREOP note/Event Note
MICU POCUS and Addendum/Event Note
Nutrition Services
Post-op Check

## 2022-09-10 NOTE — CHART NOTE - NSCHARTNOTEFT_GEN_A_CORE
Received a call from nurse Rutherford that patient was agitated and attempting to leave the hospital. Went to patient's bedside. Patient was out of the room fully dressed in his clothes asking for the exit. Attempted to have patient return to his bed to have a conversation, but patient agitated and refusing to have a conversation. Patient's family called multiple times but unable to reach anyone. Patient holding his phone in his hand but unwilling to contact family despite persuasion that he could return home if a family member could respond or come pick the patient up. Attempted to reorient the patient without success. When asked how he would return home, pt stating he would walk. Pt continued to walk out, and security was called, and eventually given of 1mg IV Ativan. Patient has QTc of 530 on last EKG and 600 in the past, and antipsychotics were avoided. Pt continued to attempt to leave, resist and kick staff, and additional 1mg IV Ativan was given.

## 2022-09-10 NOTE — DISCHARGE NOTE NURSING/CASE MANAGEMENT/SOCIAL WORK - PATIENT PORTAL LINK FT
You can access the FollowMyHealth Patient Portal offered by [facility] by registering at the following website: [email or web address]. By joining [employer]’s FollowMyHealth portal, you will also be able to view your health information using other applications (apps) compatible with our system.

## 2022-09-10 NOTE — PROVIDER CONTACT NOTE (OTHER) - ASSESSMENT
Patient A&Ox1, disoriented to time, place, situation, agitated, uncooperative, out of bed trying to walk out of unit, patient states he will take a taxi or walk home and does not want to wait for family.

## 2022-09-10 NOTE — PROGRESS NOTE ADULT - SUBJECTIVE AND OBJECTIVE BOX
Suhail Pastor, PGY2    DATE OF SERVICE: 09-10-22 @ 07:24    Patient is a 72y old  Male who presents with a chief complaint of lymphoma (07 Sep 2022 07:25)      SUBJECTIVE / OVERNIGHT EVENTS: Patient completed chemotherapy overnight. Agitated overnight and attempting to leave. Security notified. Patient received ativan 1mg x2.     Patient seen and examined this AM.     MEDICATIONS  (STANDING):  acyclovir   Oral Tab/Cap 400 milliGRAM(s) Oral two times a day  allopurinol 100 milliGRAM(s) Oral daily  apixaban 5 milliGRAM(s) Oral two times a day  atorvastatin 40 milliGRAM(s) Oral at bedtime  chlorhexidine 2% Cloths 1 Application(s) Topical daily  chlorhexidine 4% Liquid 1 Application(s) Topical <User Schedule>  dextrose 5%. 1000 milliLiter(s) (50 mL/Hr) IV Continuous <Continuous>  dextrose 5%. 1000 milliLiter(s) (100 mL/Hr) IV Continuous <Continuous>  dextrose 50% Injectable 25 Gram(s) IV Push once  dextrose 50% Injectable 12.5 Gram(s) IV Push once  dextrose 50% Injectable 25 Gram(s) IV Push once  glucagon  Injectable 1 milliGRAM(s) IntraMuscular once  insulin lispro (ADMELOG) corrective regimen sliding scale   SubCutaneous Before meals and at bedtime  metoprolol succinate  milliGRAM(s) Oral daily  multivitamin 1 Tablet(s) Oral daily  sacubitril 49 mG/valsartan 51 mG 1 Tablet(s) Oral every 12 hours  spironolactone 25 milliGRAM(s) Oral daily    MEDICATIONS  (PRN):  dextrose Oral Gel 15 Gram(s) Oral once PRN Blood Glucose LESS THAN 70 milliGRAM(s)/deciliter  melatonin 3 milliGRAM(s) Oral at bedtime PRN Insomnia  sodium chloride 0.9% lock flush 10 milliLiter(s) IV Push every 1 hour PRN Pre/post blood products, medications, blood draw, and to maintain line patency  traMADol 50 milliGRAM(s) Oral every 4 hours PRN Severe Pain (7 - 10)  traMADol 25 milliGRAM(s) Oral every 4 hours PRN Moderate Pain (4 - 6)      Vital Signs Last 24 Hrs  T(C): 36.3 (10 Sep 2022 04:57), Max: 36.9 (09 Sep 2022 17:16)  T(F): 97.3 (10 Sep 2022 04:57), Max: 98.4 (09 Sep 2022 17:16)  HR: 100 (10 Sep 2022 04:57) (89 - 112)  BP: 156/84 (10 Sep 2022 04:57) (124/72 - 156/84)  BP(mean): --  RR: 18 (10 Sep 2022 04:57) (16 - 18)  SpO2: 98% (10 Sep 2022 04:57) (98% - 98%)    Parameters below as of 10 Sep 2022 04:57  Patient On (Oxygen Delivery Method): room air      CAPILLARY BLOOD GLUCOSE      POCT Blood Glucose.: 220 mg/dL (09 Sep 2022 21:20)  POCT Blood Glucose.: 111 mg/dL (09 Sep 2022 16:17)  POCT Blood Glucose.: 157 mg/dL (09 Sep 2022 11:48)  POCT Blood Glucose.: 97 mg/dL (09 Sep 2022 07:55)    I&O's Summary    09 Sep 2022 07:01  -  10 Sep 2022 07:00  --------------------------------------------------------  IN: 250 mL / OUT: 0 mL / NET: 250 mL        PHYSICAL EXAM:  GENERAL: No apparent distress.   HEAD:  Atraumatic, Normocephalic  EYES: EOMI, PERRLA, conjunctiva and sclera clear  NECK: Supple, no lymphadenopathy, no JVD  CHEST/LUNG: Clear to auscultation bilateral and symmetric; No wheezes, rales, or rhonchi  HEART: S1 normal, loud S2. Regular rate and rhythm; No murmurs, rubs, or gallops  ABDOMEN: Soft, non-tender, non-distended; normal bowel sounds  EXTREMITIES:  2+ peripheral pulses b/l, No clubbing, cyanosis, or edema  NEUROLOGY: A&O x 2, no focal deficits  SKIN: No rashes or lesions. Dressing intact, clean.    LABS:                        9.1    7.79  )-----------( 151      ( 09 Sep 2022 07:16 )             31.2     09-09    142  |  107  |  23  ----------------------------<  93  3.6   |  25  |  0.93    Ca    8.8      09 Sep 2022 07:04  Phos  2.3     09-09  Mg     1.6     09-09                RADIOLOGY & ADDITIONAL TESTS:    Imaging Personally Reviewed:    Consultant(s) Notes Reviewed:      Care Discussed with Consultants/Other Providers:     Suhail Pastor, PGY2    DATE OF SERVICE: 09-10-22 @ 07:24    Patient is a 72y old  Male who presents with a chief complaint of lymphoma (07 Sep 2022 07:25)      SUBJECTIVE / OVERNIGHT EVENTS: Patient completed chemotherapy overnight. Agitated overnight and attempting to leave. Security notified. Patient received ativan 1mg x2.     Patient seen and examined this AM. Sleepy this AM after ativan. Denies chest pain, dyspnea, abd pain, n/v. Looking forward to going home today.     MEDICATIONS  (STANDING):  acyclovir   Oral Tab/Cap 400 milliGRAM(s) Oral two times a day  allopurinol 100 milliGRAM(s) Oral daily  apixaban 5 milliGRAM(s) Oral two times a day  atorvastatin 40 milliGRAM(s) Oral at bedtime  chlorhexidine 2% Cloths 1 Application(s) Topical daily  chlorhexidine 4% Liquid 1 Application(s) Topical <User Schedule>  dextrose 5%. 1000 milliLiter(s) (50 mL/Hr) IV Continuous <Continuous>  dextrose 5%. 1000 milliLiter(s) (100 mL/Hr) IV Continuous <Continuous>  dextrose 50% Injectable 25 Gram(s) IV Push once  dextrose 50% Injectable 12.5 Gram(s) IV Push once  dextrose 50% Injectable 25 Gram(s) IV Push once  glucagon  Injectable 1 milliGRAM(s) IntraMuscular once  insulin lispro (ADMELOG) corrective regimen sliding scale   SubCutaneous Before meals and at bedtime  metoprolol succinate  milliGRAM(s) Oral daily  multivitamin 1 Tablet(s) Oral daily  sacubitril 49 mG/valsartan 51 mG 1 Tablet(s) Oral every 12 hours  spironolactone 25 milliGRAM(s) Oral daily    MEDICATIONS  (PRN):  dextrose Oral Gel 15 Gram(s) Oral once PRN Blood Glucose LESS THAN 70 milliGRAM(s)/deciliter  melatonin 3 milliGRAM(s) Oral at bedtime PRN Insomnia  sodium chloride 0.9% lock flush 10 milliLiter(s) IV Push every 1 hour PRN Pre/post blood products, medications, blood draw, and to maintain line patency  traMADol 50 milliGRAM(s) Oral every 4 hours PRN Severe Pain (7 - 10)  traMADol 25 milliGRAM(s) Oral every 4 hours PRN Moderate Pain (4 - 6)      Vital Signs Last 24 Hrs  T(C): 36.3 (10 Sep 2022 04:57), Max: 36.9 (09 Sep 2022 17:16)  T(F): 97.3 (10 Sep 2022 04:57), Max: 98.4 (09 Sep 2022 17:16)  HR: 100 (10 Sep 2022 04:57) (89 - 112)  BP: 156/84 (10 Sep 2022 04:57) (124/72 - 156/84)  BP(mean): --  RR: 18 (10 Sep 2022 04:57) (16 - 18)  SpO2: 98% (10 Sep 2022 04:57) (98% - 98%)    Parameters below as of 10 Sep 2022 04:57  Patient On (Oxygen Delivery Method): room air      CAPILLARY BLOOD GLUCOSE      POCT Blood Glucose.: 220 mg/dL (09 Sep 2022 21:20)  POCT Blood Glucose.: 111 mg/dL (09 Sep 2022 16:17)  POCT Blood Glucose.: 157 mg/dL (09 Sep 2022 11:48)  POCT Blood Glucose.: 97 mg/dL (09 Sep 2022 07:55)    I&O's Summary    09 Sep 2022 07:01  -  10 Sep 2022 07:00  --------------------------------------------------------  IN: 250 mL / OUT: 0 mL / NET: 250 mL        PHYSICAL EXAM:  GENERAL: No apparent distress.   HEAD:  Atraumatic, Normocephalic  EYES: EOMI, PERRLA, conjunctiva and sclera clear  NECK: Supple, no lymphadenopathy, no JVD  CHEST/LUNG: Clear to auscultation bilateral and symmetric; No wheezes, rales, or rhonchi  HEART: S1 normal, loud S2. Regular rate and rhythm; No murmurs, rubs, or gallops  ABDOMEN: Soft, non-tender, non-distended; normal bowel sounds  EXTREMITIES:  2+ peripheral pulses b/l, No clubbing, cyanosis, or edema  NEUROLOGY: A&O x 2, no focal deficits  SKIN: No rashes or lesions. Dressing intact, clean.    LABS:                        9.1    7.79  )-----------( 151      ( 09 Sep 2022 07:16 )             31.2     09-09    142  |  107  |  23  ----------------------------<  93  3.6   |  25  |  0.93    Ca    8.8      09 Sep 2022 07:04  Phos  2.3     09-09  Mg     1.6     09-09                RADIOLOGY & ADDITIONAL TESTS:    Imaging Personally Reviewed:    Consultant(s) Notes Reviewed:      Care Discussed with Consultants/Other Providers:

## 2022-09-10 NOTE — DISCHARGE NOTE NURSING/CASE MANAGEMENT/SOCIAL WORK - NSDCFUADDAPPT_GEN_ALL_CORE_FT
Please follow up with your PCP within 2 weeks    Please follow up with your cardiologist and your electrophysiologist within 2 weeks    Please follow up with heme/onc within 2 weeks time    Please follow up with your heart failure specialist within 2 weeks.

## 2022-09-10 NOTE — PROGRESS NOTE ADULT - SUBJECTIVE AND OBJECTIVE BOX
Neurology Progress Note    S: Patient seen and examined.  attempted to leave this AM, security called     Medication:  MEDICATIONS  (STANDING):  acyclovir   Oral Tab/Cap 400 milliGRAM(s) Oral two times a day  allopurinol 100 milliGRAM(s) Oral daily  apixaban 5 milliGRAM(s) Oral two times a day  atorvastatin 40 milliGRAM(s) Oral at bedtime  chlorhexidine 2% Cloths 1 Application(s) Topical daily  chlorhexidine 4% Liquid 1 Application(s) Topical <User Schedule>  dextrose 5%. 1000 milliLiter(s) (50 mL/Hr) IV Continuous <Continuous>  dextrose 5%. 1000 milliLiter(s) (100 mL/Hr) IV Continuous <Continuous>  dextrose 50% Injectable 25 Gram(s) IV Push once  dextrose 50% Injectable 12.5 Gram(s) IV Push once  dextrose 50% Injectable 25 Gram(s) IV Push once  glucagon  Injectable 1 milliGRAM(s) IntraMuscular once  insulin lispro (ADMELOG) corrective regimen sliding scale   SubCutaneous Before meals and at bedtime  metoprolol succinate  milliGRAM(s) Oral daily  multivitamin 1 Tablet(s) Oral daily  sacubitril 49 mG/valsartan 51 mG 1 Tablet(s) Oral every 12 hours  spironolactone 25 milliGRAM(s) Oral daily    MEDICATIONS  (PRN):  dextrose Oral Gel 15 Gram(s) Oral once PRN Blood Glucose LESS THAN 70 milliGRAM(s)/deciliter  melatonin 3 milliGRAM(s) Oral at bedtime PRN Insomnia  sodium chloride 0.9% lock flush 10 milliLiter(s) IV Push every 1 hour PRN Pre/post blood products, medications, blood draw, and to maintain line patency  traMADol 50 milliGRAM(s) Oral every 4 hours PRN Severe Pain (7 - 10)  traMADol 25 milliGRAM(s) Oral every 4 hours PRN Moderate Pain (4 - 6)      Vitals:    Vital Signs Last 24 Hrs  T(C): 36.3 (09-10-22 @ 04:57), Max: 36.9 (22 @ 17:16)  T(F): 97.3 (09-10-22 @ 04:57), Max: 98.4 (22 @ 17:16)  HR: 100 (09-10-22 @ 04:57) (89 - 112)  BP: 156/84 (09-10-22 @ 04:57) (124/72 - 156/84)  BP(mean): --  RR: 18 (09-10-22 @ 04:57) (16 - 18)  SpO2: 98% (09-10-22 @ 04:57) (98% - 98%)              General Exam:   General Appearance: Appropriately dressed and in no acute distress       Head: Normocephalic, atraumatic and no dysmorphic features  Ear, Nose, and Throat: Moist mucous membranes  CVS: S1S2+  Resp: No SOB, no wheeze or rhonchi  Abd: soft NTND  Extremities: No edema, no cyanosis  Skin: No bruises, no rashes     Neurological Exam:  - Mental Status:  Alert, awake, oriented to person, not place, and time; Speech is fluent with intact naming.   - Cranial Nerves II-XII:    II:  Visual fields are full to confrontation; Pupils are equal, round, and reactive to light  III, IV, VI:  Extraocular movements are intact without nystagmus.  V:  Facial sensation is intact in the V1-V3 distribution bilaterally.  VII:  Face is symmetric with normal eye closure and smile  VIII:  Hearing is intact to finger rub.  IX, X:  Uvula is midline and soft palate rises symmetrically  XI:  Head turning and shoulder shrug are intact.  XII:  Tongue protudes in the midline.  - Motor:  Strength is 5/5 throughout.  There is no pronator drift.  Normal muscle bulk and tone throughout.  - Sensory:  Intact throughout to light touch.  - Coordination:  Finger-nose-finger and heel-knee-shin intact without dysmetria, but b/l intention tremor.   - Gait:   Due to fall risk, did not assess.       I personally reviewed the below data/images/labs:  CBC Full  -  ( 09 Sep 2022 07:16 )  WBC Count : 7.79 K/uL  RBC Count : 3.55 M/uL  Hemoglobin : 9.1 g/dL  Hematocrit : 31.2 %  Platelet Count - Automated : 151 K/uL  Mean Cell Volume : 87.9 fl  Mean Cell Hemoglobin : 25.6 pg  Mean Cell Hemoglobin Concentration : 29.2 gm/dL  Auto Neutrophil # : x  Auto Lymphocyte # : x  Auto Monocyte # : x  Auto Eosinophil # : x  Auto Basophil # : x  Auto Neutrophil % : x  Auto Lymphocyte % : x  Auto Monocyte % : x  Auto Eosinophil % : x  Auto Basophil % : x        142  |  107  |  23  ----------------------------<  93  3.6   |  25  |  0.93    Ca    8.8      09 Sep 2022 07:04  Phos  2.3       Mg     1.6           Urinalysis Basic - ( 01 Sep 2022 12:01 )    Color: Light Yellow / Appearance: Clear / S.011 / pH: x  Gluc: x / Ketone: Negative  / Bili: Negative / Urobili: Negative   Blood: x / Protein: Negative / Nitrite: Negative   Leuk Esterase: Negative / RBC: x / WBC x   Sq Epi: x / Non Sq Epi: x / Bacteria: x    Acute/subacute right-sided parietal lobe infarction   superimposed upon a chronic infarct. Findings appear larger in size when   compared with 2019.    No hemorrhagic transformation at this time.    Similar-appearing mild chronic white matter microvascular type changes   and chronic lacunar infarct within the right basal ganglia.  < from: MR Head No Cont (22 @ 13:22) >    ACC: [id number] EXAM:  MR ANGIO NECK                        ACC: [id number] EXAM:  MR BRAIN                        ACC: [id number] EXAM:  MR ANGIO BRAIN                          PROCEDURE DATE:  2022          INTERPRETATION:  CLINICAL INDICATION: Acute on chronic CVA      Magnetic resonance imaging of the brain was carried out with axial   susceptibility weighted series, diffusion weighted series and sagittal T1   weighted series on a 1.5 Nell magnet.    Magnetic resonance angiography of the carotid and vertebral circulation   the neck was obtained using 2D time-of-flight technique. The intracranial   circulation centered the [id number] was evaluated using 3D   time-of-flight technique.    The Comparison is made with the prior brain CT of 2022.      The patient was claustrophobic and only part of the MRA of the neck and   brain MRI were obtained.    The images of the neck are limited by motion. Be further evaluated with   CTA as clinically indicated.    There is mild to moderate atrophy. Small vessel white matter ischemic   changes are noted. There is an old right parietal infarct. No acute   infarcts are identified on diffusion-weighted imaging. There is some   hemosiderin staining in the old parietal infarct..    The neck MRA is limited by motion and internal carotid artery stenosis is   not excluded. The right vertebral artery is dominant.    The distal cervical, petrous cavernous and supraclinoid internal carotid   arteries are normal. The anterior cerebral arteries and anterior   communicating artery are normal. The middle cerebral arteries are normal.   The basilar artery, posterior cerebral arteries and superior cerebellar   arteries are normal. The anterior inferior cerebellar arteries are   normal. Bilateral posterior communicating arteries are visualized.      IMPRESSION: Limited examination as the patient was unable to cooperate.   Old right parietal infarct. No acute infarcts. Normal intracranial   circulation, limited MRA of the neck due to motion.    --- End of Report ---            CAITY MCGUIRE MD; Attending Radiologist  This document has been electronically signed. Sep  6 2022  1:41PM    < end of copied text >

## 2022-09-10 NOTE — PROVIDER CONTACT NOTE (OTHER) - ACTION/TREATMENT ORDERED:
Provider and security called to bedside, RN and staff attempted to reorient and calm patient, unsuccessful, patient's daughter Vonnie called with no answer, IV Ativan 1mg ordered.

## 2022-09-10 NOTE — CHART NOTE - NSCHARTNOTEFT_GEN_A_CORE
Notified by  that patient will need to have PICC care set up before he can be discharged. Discussed with oncology team and notified that patient will require PICC line in place for outpatient chemotherapy. Explained the situation to patient's daughter who is agreeable to wait for PICC home care to be set up prior to discharge. She is unable to  patient tomorrow, so will plan for tentative discharge Monday morning. Updated  regarding the situation and will have team deliver scripts for PICC care to  tomorrow.     Suhail Pastor  PGY2

## 2022-09-10 NOTE — PROVIDER CONTACT NOTE (OTHER) - RECOMMENDATIONS
Team MD Raman Sandoval notified.
Team MD Raman Sandoval notified and evaluated patient at the bedside.
Elian Wheeler (Team 3) made aware.
Provider and security called to bedside, RN and staff attempted to reorient and calm patient, unsuccessful, patient's daughter Vonnie called with no answer, IV Ativan 1mg ordered.
Sergio Melara MD notified

## 2022-09-10 NOTE — PROVIDER CONTACT NOTE (OTHER) - BACKGROUND
Patient admitted with tumor lysis syndrome
Pt admitted with tumor lysis syndrome. Pt with order for d/c with transport after chemo, family waited at bedside until 2030, infusion completed at 2130, per family they will return in the AM for d/c.
72 year old male admitted for tumor lysis syndrome. PMH Aflutter, moderate mitral regurgitation, systolic heart failure, essential HTN, non-Hodgkin's lymphoma.
Patient admitted with tumor lysis syndrome
Pt admitted for tumor lysis syndrome. PMH of impaired memory, non-Hodgkin's lymphoma, pleural effusion, etc.

## 2022-09-10 NOTE — PROVIDER CONTACT NOTE (OTHER) - SITUATION
Pt had 6 beats WCT on tele.
Pt had first time AIVR at 83bpm for 15 seconds
Patient woke up agitated trying to leave hospital.
Tele event : 2:1 AV block / Failure to capture  HR down to 30
Received patient A&O2, confused, disoriented to time and situation

## 2022-09-10 NOTE — PROGRESS NOTE ADULT - ATTENDING COMMENTS
73yo M w/ extensive PMHx including DLBCL, G6PD deficiency, complete heart block s/p PPM, HFrEF (new EF 20%), CKD 3b, paroxysmal atrial fibrillation, treated for TLS syndrome w/ rasburicase, complicated by acute hemolytic anemia in setting of G6PD deficiency. Patient also p/w acute hypoxic respiratory failure due to heart failure exacerbation requiring bipap initially. He was eventually titrated off bipap and nasal cannula, now breathing comfortably on room air after diuresis.    Received chemo yesterday and was planned for D/C however his family left and safe d/c could not be established. Pt unable to establish PICC care today, so will need to establish before d/c.

## 2022-09-10 NOTE — PROVIDER CONTACT NOTE (OTHER) - REASON
Pt had first time AIVR at 83bpm for 15 seconds
Patient woke up agitated trying to leave hospital
Tele event : 2:1 AV block / Failure to capture HR down to HR 30
Pt had 6 beats WCT on tele.
Received patient A&O2, confused, disoriented to time and situation

## 2022-09-10 NOTE — DISCHARGE NOTE NURSING/CASE MANAGEMENT/SOCIAL WORK - NSDCPEFALRISK_GEN_ALL_CORE
For information on Fall & Injury Prevention, visit: [email or web address] OR  [email or web address] OR  https://www.cdc.gov/steadi/patient.html

## 2022-09-10 NOTE — PROGRESS NOTE ADULT - PROBLEM SELECTOR PLAN 9
DIET: Dash/TLC  DVT Prophylaxis: Eliquis 5 mg BID  Dispo: Outpatient PT DIET: Dash/TLC  DVT Prophylaxis: Eliquis 5 mg BID  Dispo: Outpatient PT, pending DC home today DIET: Dash/TLC  DVT Prophylaxis: Eliquis 5 mg BID  Dispo: Outpatient PT, pending DC home

## 2022-09-11 LAB
GLUCOSE BLDC GLUCOMTR-MCNC: 102 MG/DL — HIGH (ref 70–99)
GLUCOSE BLDC GLUCOMTR-MCNC: 107 MG/DL — HIGH (ref 70–99)
GLUCOSE BLDC GLUCOMTR-MCNC: 127 MG/DL — HIGH (ref 70–99)

## 2022-09-11 PROCEDURE — 99232 SBSQ HOSP IP/OBS MODERATE 35: CPT

## 2022-09-11 RX ORDER — SODIUM CHLORIDE 9 MG/ML
1 INJECTION INTRAMUSCULAR; INTRAVENOUS; SUBCUTANEOUS
Qty: 900 | Refills: 0
Start: 2022-09-11 | End: 2022-10-10

## 2022-09-11 RX ORDER — SODIUM CHLORIDE 9 MG/ML
10 INJECTION INTRAMUSCULAR; INTRAVENOUS; SUBCUTANEOUS EVERY 8 HOURS
Refills: 0 | Status: DISCONTINUED | OUTPATIENT
Start: 2022-09-11 | End: 2022-09-12

## 2022-09-11 RX ORDER — SODIUM CHLORIDE 9 MG/ML
1 INJECTION INTRAMUSCULAR; INTRAVENOUS; SUBCUTANEOUS
Qty: 1800 | Refills: 0
Start: 2022-09-11 | End: 2022-10-10

## 2022-09-11 RX ORDER — SODIUM CHLORIDE 9 MG/ML
1 INJECTION INTRAMUSCULAR; INTRAVENOUS; SUBCUTANEOUS
Qty: 180 | Refills: 0
Start: 2022-09-11 | End: 2022-10-10

## 2022-09-11 RX ADMIN — APIXABAN 5 MILLIGRAM(S): 2.5 TABLET, FILM COATED ORAL at 06:46

## 2022-09-11 RX ADMIN — Medication 400 MILLIGRAM(S): at 06:46

## 2022-09-11 RX ADMIN — SACUBITRIL AND VALSARTAN 1 TABLET(S): 24; 26 TABLET, FILM COATED ORAL at 06:46

## 2022-09-11 RX ADMIN — Medication 1 TABLET(S): at 13:08

## 2022-09-11 RX ADMIN — APIXABAN 5 MILLIGRAM(S): 2.5 TABLET, FILM COATED ORAL at 17:56

## 2022-09-11 RX ADMIN — Medication 100 MILLIGRAM(S): at 13:08

## 2022-09-11 RX ADMIN — ATORVASTATIN CALCIUM 40 MILLIGRAM(S): 80 TABLET, FILM COATED ORAL at 21:34

## 2022-09-11 RX ADMIN — Medication 100 MILLIGRAM(S): at 06:46

## 2022-09-11 RX ADMIN — Medication 400 MILLIGRAM(S): at 17:55

## 2022-09-11 RX ADMIN — SACUBITRIL AND VALSARTAN 1 TABLET(S): 24; 26 TABLET, FILM COATED ORAL at 17:55

## 2022-09-11 RX ADMIN — CHLORHEXIDINE GLUCONATE 1 APPLICATION(S): 213 SOLUTION TOPICAL at 12:48

## 2022-09-11 RX ADMIN — CHLORHEXIDINE GLUCONATE 1 APPLICATION(S): 213 SOLUTION TOPICAL at 06:45

## 2022-09-11 RX ADMIN — SPIRONOLACTONE 25 MILLIGRAM(S): 25 TABLET, FILM COATED ORAL at 06:46

## 2022-09-11 RX ADMIN — Medication 3 MILLIGRAM(S): at 21:34

## 2022-09-11 NOTE — PROGRESS NOTE ADULT - SUBJECTIVE AND OBJECTIVE BOX
PROGRESS NOTE:   Authored by Dave Vanegas MD   Patient is a 72y old  Male who presents with a chief complaint of lymphoma (07 Sep 2022 07:25)      SUBJECTIVE / OVERNIGHT EVENTS: No acute events overnight. No chest pain,  palpitations, dyspnea, abdominal pain. nausea, vomiting, diarrhea, blurry vision, dysuria, lightheadedness, dizziness.    ADDITIONAL REVIEW OF SYSTEMS:    MEDICATIONS  (STANDING):  acyclovir   Oral Tab/Cap 400 milliGRAM(s) Oral two times a day  allopurinol 100 milliGRAM(s) Oral daily  apixaban 5 milliGRAM(s) Oral two times a day  atorvastatin 40 milliGRAM(s) Oral at bedtime  chlorhexidine 2% Cloths 1 Application(s) Topical daily  chlorhexidine 4% Liquid 1 Application(s) Topical <User Schedule>  dextrose 5%. 1000 milliLiter(s) (100 mL/Hr) IV Continuous <Continuous>  dextrose 5%. 1000 milliLiter(s) (50 mL/Hr) IV Continuous <Continuous>  dextrose 50% Injectable 25 Gram(s) IV Push once  dextrose 50% Injectable 12.5 Gram(s) IV Push once  dextrose 50% Injectable 25 Gram(s) IV Push once  glucagon  Injectable 1 milliGRAM(s) IntraMuscular once  insulin lispro (ADMELOG) corrective regimen sliding scale   SubCutaneous Before meals and at bedtime  metoprolol succinate  milliGRAM(s) Oral daily  multivitamin 1 Tablet(s) Oral daily  sacubitril 49 mG/valsartan 51 mG 1 Tablet(s) Oral every 12 hours  spironolactone 25 milliGRAM(s) Oral daily    MEDICATIONS  (PRN):  dextrose Oral Gel 15 Gram(s) Oral once PRN Blood Glucose LESS THAN 70 milliGRAM(s)/deciliter  melatonin 3 milliGRAM(s) Oral at bedtime PRN Insomnia  sodium chloride 0.9% lock flush 10 milliLiter(s) IV Push every 1 hour PRN Pre/post blood products, medications, blood draw, and to maintain line patency  traMADol 25 milliGRAM(s) Oral every 4 hours PRN Moderate Pain (4 - 6)  traMADol 50 milliGRAM(s) Oral every 4 hours PRN Severe Pain (7 - 10)      CAPILLARY BLOOD GLUCOSE      POCT Blood Glucose.: 149 mg/dL (10 Sep 2022 21:15)  POCT Blood Glucose.: 129 mg/dL (10 Sep 2022 16:12)  POCT Blood Glucose.: 109 mg/dL (10 Sep 2022 11:51)  POCT Blood Glucose.: 132 mg/dL (10 Sep 2022 07:37)    I&O's Summary      PHYSICAL EXAM:  Vital Signs Last 24 Hrs  T(C): 36.3 (11 Sep 2022 04:51), Max: 36.4 (10 Sep 2022 12:12)  T(F): 97.4 (11 Sep 2022 04:51), Max: 97.5 (10 Sep 2022 12:12)  HR: 85 (11 Sep 2022 04:51) (85 - 96)  BP: 129/82 (11 Sep 2022 04:51) (124/77 - 139/81)  BP(mean): --  RR: 18 (11 Sep 2022 04:51) (18 - 18)  SpO2: 99% (11 Sep 2022 04:51) (99% - 99%)    Parameters below as of 11 Sep 2022 04:51  Patient On (Oxygen Delivery Method): room air        PHYSICAL EXAM:  GENERAL: No apparent distress.   HEAD:  Atraumatic, Normocephalic  EYES: EOMI, PERRLA, conjunctiva and sclera clear  NECK: Supple, no lymphadenopathy, no JVD  CHEST/LUNG: Clear to auscultation bilateral and symmetric; No wheezes, rales, or rhonchi  HEART: S1 normal, loud S2. Regular rate and rhythm; No murmurs, rubs, or gallops  ABDOMEN: Soft, non-tender, non-distended; normal bowel sounds  EXTREMITIES:  2+ peripheral pulses b/l, No clubbing, cyanosis, or edema  NEUROLOGY: A&O x 2, no focal deficits  SKIN: No rashes or lesions. Dressing intact, clean.      LABS:                        9.1    7.79  )-----------( 151      ( 09 Sep 2022 07:16 )             31.2                       RADIOLOGY & ADDITIONAL TESTS:  Lab Results Reviewed   Imaging Reviewed  Electrocardiogram Reviewed

## 2022-09-11 NOTE — PROGRESS NOTE ADULT - ATTENDING COMMENTS
73yo M w/ extensive PMHx including DLBCL, G6PD deficiency, complete heart block s/p PPM, HFrEF (new EF 20%), CKD 3b, paroxysmal atrial fibrillation, treated for TLS syndrome w/ rasburicase, complicated by acute hemolytic anemia in setting of G6PD deficiency. Patient also p/w acute hypoxic respiratory failure due to heart failure exacerbation requiring bipap initially. He was eventually titrated off bipap and nasal cannula, now breathing comfortably on room air after diuresis.    Received chemo 9/10 and was planned for D/C after chemo however his family left and safe d/c could not be established. Pt unable to establish PICC care, so will need to establish before d/c. Per CM, can be d/c 9/12 w/ PICC care. [Negative] : Heme/Lymph

## 2022-09-12 ENCOUNTER — APPOINTMENT (OUTPATIENT)
Dept: HEMATOLOGY ONCOLOGY | Facility: CLINIC | Age: 72
End: 2022-09-12

## 2022-09-12 VITALS
RESPIRATION RATE: 18 BRPM | DIASTOLIC BLOOD PRESSURE: 78 MMHG | OXYGEN SATURATION: 97 % | HEART RATE: 91 BPM | SYSTOLIC BLOOD PRESSURE: 119 MMHG | TEMPERATURE: 99 F

## 2022-09-12 LAB
GLUCOSE BLDC GLUCOMTR-MCNC: 104 MG/DL — HIGH (ref 70–99)
GLUCOSE BLDC GLUCOMTR-MCNC: 158 MG/DL — HIGH (ref 70–99)
TM INTERPRETATION: SIGNIFICANT CHANGE UP

## 2022-09-12 PROCEDURE — 71045 X-RAY EXAM CHEST 1 VIEW: CPT

## 2022-09-12 PROCEDURE — 97129 THER IVNTJ 1ST 15 MIN: CPT

## 2022-09-12 PROCEDURE — U0005: CPT

## 2022-09-12 PROCEDURE — P9040: CPT

## 2022-09-12 PROCEDURE — 83880 ASSAY OF NATRIURETIC PEPTIDE: CPT

## 2022-09-12 PROCEDURE — 97116 GAIT TRAINING THERAPY: CPT

## 2022-09-12 PROCEDURE — 82947 ASSAY GLUCOSE BLOOD QUANT: CPT

## 2022-09-12 PROCEDURE — 86880 COOMBS TEST DIRECT: CPT

## 2022-09-12 PROCEDURE — 87205 SMEAR GRAM STAIN: CPT

## 2022-09-12 PROCEDURE — 99239 HOSP IP/OBS DSCHRG MGMT >30: CPT

## 2022-09-12 PROCEDURE — 96374 THER/PROPH/DIAG INJ IV PUSH: CPT

## 2022-09-12 PROCEDURE — 84132 ASSAY OF SERUM POTASSIUM: CPT

## 2022-09-12 PROCEDURE — 97112 NEUROMUSCULAR REEDUCATION: CPT

## 2022-09-12 PROCEDURE — 86705 HEP B CORE ANTIBODY IGM: CPT

## 2022-09-12 PROCEDURE — 82962 GLUCOSE BLOOD TEST: CPT

## 2022-09-12 PROCEDURE — 87641 MR-STAPH DNA AMP PROBE: CPT

## 2022-09-12 PROCEDURE — 83930 ASSAY OF BLOOD OSMOLALITY: CPT

## 2022-09-12 PROCEDURE — 70544 MR ANGIOGRAPHY HEAD W/O DYE: CPT

## 2022-09-12 PROCEDURE — C1894: CPT

## 2022-09-12 PROCEDURE — 88365 INSITU HYBRIDIZATION (FISH): CPT

## 2022-09-12 PROCEDURE — 88360 TUMOR IMMUNOHISTOCHEM/MANUAL: CPT

## 2022-09-12 PROCEDURE — 97166 OT EVAL MOD COMPLEX 45 MIN: CPT

## 2022-09-12 PROCEDURE — 87086 URINE CULTURE/COLONY COUNT: CPT

## 2022-09-12 PROCEDURE — 86901 BLOOD TYPING SEROLOGIC RH(D): CPT

## 2022-09-12 PROCEDURE — 87389 HIV-1 AG W/HIV-1&-2 AB AG IA: CPT

## 2022-09-12 PROCEDURE — 86923 COMPATIBILITY TEST ELECTRIC: CPT

## 2022-09-12 PROCEDURE — 82435 ASSAY OF BLOOD CHLORIDE: CPT

## 2022-09-12 PROCEDURE — 84484 ASSAY OF TROPONIN QUANT: CPT

## 2022-09-12 PROCEDURE — 81003 URINALYSIS AUTO W/O SCOPE: CPT

## 2022-09-12 PROCEDURE — 83735 ASSAY OF MAGNESIUM: CPT

## 2022-09-12 PROCEDURE — 70450 CT HEAD/BRAIN W/O DYE: CPT

## 2022-09-12 PROCEDURE — 83050 HGB METHEMOGLOBIN QUAN: CPT

## 2022-09-12 PROCEDURE — 84300 ASSAY OF URINE SODIUM: CPT

## 2022-09-12 PROCEDURE — 93005 ELECTROCARDIOGRAM TRACING: CPT

## 2022-09-12 PROCEDURE — 86850 RBC ANTIBODY SCREEN: CPT

## 2022-09-12 PROCEDURE — C1889: CPT

## 2022-09-12 PROCEDURE — 87637 SARSCOV2&INF A&B&RSV AMP PRB: CPT

## 2022-09-12 PROCEDURE — 88331 PATH CONSLTJ SURG 1 BLK 1SPC: CPT

## 2022-09-12 PROCEDURE — 82272 OCCULT BLD FECES 1-3 TESTS: CPT

## 2022-09-12 PROCEDURE — 85025 COMPLETE CBC W/AUTO DIFF WBC: CPT

## 2022-09-12 PROCEDURE — 83690 ASSAY OF LIPASE: CPT

## 2022-09-12 PROCEDURE — 88341 IMHCHEM/IMCYTCHM EA ADD ANTB: CPT

## 2022-09-12 PROCEDURE — 82550 ASSAY OF CK (CPK): CPT

## 2022-09-12 PROCEDURE — 83010 ASSAY OF HAPTOGLOBIN QUANT: CPT

## 2022-09-12 PROCEDURE — 84295 ASSAY OF SERUM SODIUM: CPT

## 2022-09-12 PROCEDURE — 88237 TISSUE CULTURE BONE MARROW: CPT

## 2022-09-12 PROCEDURE — 74177 CT ABD & PELVIS W/CONTRAST: CPT | Mod: MA

## 2022-09-12 PROCEDURE — 36415 COLL VENOUS BLD VENIPUNCTURE: CPT

## 2022-09-12 PROCEDURE — 80061 LIPID PANEL: CPT

## 2022-09-12 PROCEDURE — 97161 PT EVAL LOW COMPLEX 20 MIN: CPT

## 2022-09-12 PROCEDURE — 83605 ASSAY OF LACTIC ACID: CPT

## 2022-09-12 PROCEDURE — 80053 COMPREHEN METABOLIC PANEL: CPT

## 2022-09-12 PROCEDURE — 85018 HEMOGLOBIN: CPT

## 2022-09-12 PROCEDURE — 85014 HEMATOCRIT: CPT

## 2022-09-12 PROCEDURE — 85730 THROMBOPLASTIN TIME PARTIAL: CPT

## 2022-09-12 PROCEDURE — 84100 ASSAY OF PHOSPHORUS: CPT

## 2022-09-12 PROCEDURE — 93306 TTE W/DOPPLER COMPLETE: CPT

## 2022-09-12 PROCEDURE — 97130 THER IVNTJ EA ADDL 15 MIN: CPT

## 2022-09-12 PROCEDURE — P9045: CPT

## 2022-09-12 PROCEDURE — 83036 HEMOGLOBIN GLYCOSYLATED A1C: CPT

## 2022-09-12 PROCEDURE — 85610 PROTHROMBIN TIME: CPT

## 2022-09-12 PROCEDURE — C1751: CPT

## 2022-09-12 PROCEDURE — 88342 IMHCHEM/IMCYTCHM 1ST ANTB: CPT

## 2022-09-12 PROCEDURE — 84550 ASSAY OF BLOOD/URIC ACID: CPT

## 2022-09-12 PROCEDURE — 82565 ASSAY OF CREATININE: CPT

## 2022-09-12 PROCEDURE — U0003: CPT

## 2022-09-12 PROCEDURE — 82330 ASSAY OF CALCIUM: CPT

## 2022-09-12 PROCEDURE — 36569 INSJ PICC 5 YR+ W/O IMAGING: CPT

## 2022-09-12 PROCEDURE — 80048 BASIC METABOLIC PNL TOTAL CA: CPT

## 2022-09-12 PROCEDURE — 82248 BILIRUBIN DIRECT: CPT

## 2022-09-12 PROCEDURE — 87640 STAPH A DNA AMP PROBE: CPT

## 2022-09-12 PROCEDURE — 86900 BLOOD TYPING SEROLOGIC ABO: CPT

## 2022-09-12 PROCEDURE — 36430 TRANSFUSION BLD/BLD COMPNT: CPT

## 2022-09-12 PROCEDURE — 82803 BLOOD GASES ANY COMBINATION: CPT

## 2022-09-12 PROCEDURE — 87040 BLOOD CULTURE FOR BACTERIA: CPT

## 2022-09-12 PROCEDURE — 83935 ASSAY OF URINE OSMOLALITY: CPT

## 2022-09-12 PROCEDURE — 70551 MRI BRAIN STEM W/O DYE: CPT

## 2022-09-12 PROCEDURE — 83615 LACTATE (LD) (LDH) ENZYME: CPT

## 2022-09-12 PROCEDURE — 82553 CREATINE MB FRACTION: CPT

## 2022-09-12 PROCEDURE — 82247 BILIRUBIN TOTAL: CPT

## 2022-09-12 PROCEDURE — 99285 EMERGENCY DEPT VISIT HI MDM: CPT | Mod: 25

## 2022-09-12 PROCEDURE — 94660 CPAP INITIATION&MGMT: CPT

## 2022-09-12 PROCEDURE — 71046 X-RAY EXAM CHEST 2 VIEWS: CPT

## 2022-09-12 PROCEDURE — 70547 MR ANGIOGRAPHY NECK W/O DYE: CPT

## 2022-09-12 PROCEDURE — 82955 ASSAY OF G6PD ENZYME: CPT

## 2022-09-12 PROCEDURE — 85027 COMPLETE CBC AUTOMATED: CPT

## 2022-09-12 PROCEDURE — 80074 ACUTE HEPATITIS PANEL: CPT

## 2022-09-12 PROCEDURE — 86704 HEP B CORE ANTIBODY TOTAL: CPT

## 2022-09-12 RX ADMIN — SPIRONOLACTONE 25 MILLIGRAM(S): 25 TABLET, FILM COATED ORAL at 06:30

## 2022-09-12 RX ADMIN — Medication 100 MILLIGRAM(S): at 11:33

## 2022-09-12 RX ADMIN — Medication 400 MILLIGRAM(S): at 06:28

## 2022-09-12 RX ADMIN — Medication 1 TABLET(S): at 11:33

## 2022-09-12 RX ADMIN — CHLORHEXIDINE GLUCONATE 1 APPLICATION(S): 213 SOLUTION TOPICAL at 11:33

## 2022-09-12 RX ADMIN — SACUBITRIL AND VALSARTAN 1 TABLET(S): 24; 26 TABLET, FILM COATED ORAL at 06:28

## 2022-09-12 RX ADMIN — APIXABAN 5 MILLIGRAM(S): 2.5 TABLET, FILM COATED ORAL at 06:28

## 2022-09-12 RX ADMIN — Medication 100 MILLIGRAM(S): at 06:28

## 2022-09-12 RX ADMIN — CHLORHEXIDINE GLUCONATE 1 APPLICATION(S): 213 SOLUTION TOPICAL at 06:28

## 2022-09-12 NOTE — PROGRESS NOTE ADULT - PROVIDER SPECIALTY LIST ADULT
Heart Failure
Heme/Onc
Internal Medicine
Nephrology
Nephrology
Neurology
Surgery
Heart Failure
Heme/Onc
Internal Medicine
Nephrology
Surgery
Surgery
Heart Failure
Heart Failure
Heme/Onc
Internal Medicine
Nephrology
Nephrology
Neurology
Surgery
Heme/Onc
Internal Medicine
Internal Medicine
Neurology
Neurology
Heme/Onc
Heart Failure
Internal Medicine
Heart Failure
Internal Medicine
Heart Failure
Internal Medicine
Heart Failure
Internal Medicine
Internal Medicine
Heart Failure
Internal Medicine

## 2022-09-12 NOTE — PROGRESS NOTE ADULT - ATTENDING SUPERVISION STATEMENT
Fellow
Resident
Fellow
Resident

## 2022-09-12 NOTE — PROGRESS NOTE ADULT - SUBJECTIVE AND OBJECTIVE BOX
Neurology Progress Note    S: Patient seen and examined.   dc plan     Medication:  MEDICATIONS  (STANDING):  acyclovir   Oral Tab/Cap 400 milliGRAM(s) Oral two times a day  allopurinol 100 milliGRAM(s) Oral daily  apixaban 5 milliGRAM(s) Oral two times a day  atorvastatin 40 milliGRAM(s) Oral at bedtime  chlorhexidine 2% Cloths 1 Application(s) Topical daily  chlorhexidine 4% Liquid 1 Application(s) Topical <User Schedule>  dextrose 5%. 1000 milliLiter(s) (100 mL/Hr) IV Continuous <Continuous>  dextrose 5%. 1000 milliLiter(s) (50 mL/Hr) IV Continuous <Continuous>  dextrose 50% Injectable 25 Gram(s) IV Push once  dextrose 50% Injectable 12.5 Gram(s) IV Push once  dextrose 50% Injectable 25 Gram(s) IV Push once  glucagon  Injectable 1 milliGRAM(s) IntraMuscular once  insulin lispro (ADMELOG) corrective regimen sliding scale   SubCutaneous Before meals and at bedtime  metoprolol succinate  milliGRAM(s) Oral daily  multivitamin 1 Tablet(s) Oral daily  sacubitril 49 mG/valsartan 51 mG 1 Tablet(s) Oral every 12 hours  spironolactone 25 milliGRAM(s) Oral daily    MEDICATIONS  (PRN):  dextrose Oral Gel 15 Gram(s) Oral once PRN Blood Glucose LESS THAN 70 milliGRAM(s)/deciliter  melatonin 3 milliGRAM(s) Oral at bedtime PRN Insomnia  sodium chloride 0.9% lock flush 10 milliLiter(s) IV Push every 1 hour PRN Pre/post blood products, medications, blood draw, and to maintain line patency  sodium chloride 0.9% lock flush 10 milliLiter(s) IV Push every 8 hours PRN Pre/post blood products, medications, blood draw, and to maintain line patency  traMADol 25 milliGRAM(s) Oral every 4 hours PRN Moderate Pain (4 - 6)  traMADol 50 milliGRAM(s) Oral every 4 hours PRN Severe Pain (7 - 10)        Vitals:    Vital Signs Last 24 Hrs  T(C): 37.1 (12 Sep 2022 12:07), Max: 37.1 (12 Sep 2022 12:07)  T(F): 98.8 (12 Sep 2022 12:07), Max: 98.8 (12 Sep 2022 12:07)  HR: 91 (12 Sep 2022 12:07) (63 - 91)  BP: 119/78 (12 Sep 2022 12:07) (112/72 - 130/80)  BP(mean): --  RR: 18 (12 Sep 2022 12:07) (18 - 18)  SpO2: 97% (12 Sep 2022 12:07) (97% - 100%)    Parameters below as of 12 Sep 2022 12:07  Patient On (Oxygen Delivery Method): room air        General Exam:   General Appearance: Appropriately dressed and in no acute distress       Head: Normocephalic, atraumatic and no dysmorphic features  Ear, Nose, and Throat: Moist mucous membranes  CVS: S1S2+  Resp: No SOB, no wheeze or rhonchi  Abd: soft NTND  Extremities: No edema, no cyanosis  Skin: No bruises, no rashes     Neurological Exam:  - Mental Status:  Alert, awake, oriented to person, not place, and time; Speech is fluent with intact naming.   - Cranial Nerves II-XII:    II:  Visual fields are full to confrontation; Pupils are equal, round, and reactive to light  III, IV, VI:  Extraocular movements are intact without nystagmus.  V:  Facial sensation is intact in the V1-V3 distribution bilaterally.  VII:  Face is symmetric with normal eye closure and smile  VIII:  Hearing is intact to finger rub.  IX, X:  Uvula is midline and soft palate rises symmetrically  XI:  Head turning and shoulder shrug are intact.  XII:  Tongue protudes in the midline.  - Motor:  Strength is 5/5 throughout.  There is no pronator drift.  Normal muscle bulk and tone throughout.  - Sensory:  Intact throughout to light touch.  - Coordination:  Finger-nose-finger and heel-knee-shin intact without dysmetria, but b/l intention tremor.   - Gait:   Due to fall risk, did not assess.       I personally reviewed the below data/images/labs:    no new labs     Urinalysis Basic - ( 01 Sep 2022 12:01 )    Color: Light Yellow / Appearance: Clear / S.011 / pH: x  Gluc: x / Ketone: Negative  / Bili: Negative / Urobili: Negative   Blood: x / Protein: Negative / Nitrite: Negative   Leuk Esterase: Negative / RBC: x / WBC x   Sq Epi: x / Non Sq Epi: x / Bacteria: x    Acute/subacute right-sided parietal lobe infarction   superimposed upon a chronic infarct. Findings appear larger in size when   compared with 2019.    No hemorrhagic transformation at this time.    Similar-appearing mild chronic white matter microvascular type changes   and chronic lacunar infarct within the right basal ganglia.  < from: MR Head No Cont (22 @ 13:22) >    ACC: [id number] EXAM:  MR ANGIO NECK                        ACC: [id number] EXAM:  MR BRAIN                        ACC: [id number] EXAM:  MR ANGIO BRAIN                          PROCEDURE DATE:  2022          INTERPRETATION:  CLINICAL INDICATION: Acute on chronic CVA      Magnetic resonance imaging of the brain was carried out with axial   susceptibility weighted series, diffusion weighted series and sagittal T1   weighted series on a 1.5 Nell magnet.    Magnetic resonance angiography of the carotid and vertebral circulation   the neck was obtained using 2D time-of-flight technique. The intracranial   circulation centered the [id number]-[id number] was evaluated using 3D   time-of-flight technique.    The Comparison is made with the prior brain CT of 2022.      The patient was claustrophobic and only part of the MRA of the neck and   brain MRI were obtained.    The images of the neck are limited by motion. Be further evaluated with   CTA as clinically indicated.    There is mild to moderate atrophy. Small vessel white matter ischemic   changes are noted. There is an old right parietal infarct. No acute   infarcts are identified on diffusion-weighted imaging. There is some   hemosiderin staining in the old parietal infarct..    The neck MRA is limited by motion and internal carotid artery stenosis is   not excluded. The right vertebral artery is dominant.    The distal cervical, petrous cavernous and supraclinoid internal carotid   arteries are normal. The anterior cerebral arteries and anterior   communicating artery are normal. The middle cerebral arteries are normal.   The basilar artery, posterior cerebral arteries and superior cerebellar   arteries are normal. The anterior inferior cerebellar arteries are   normal. Bilateral posterior communicating arteries are visualized.      IMPRESSION: Limited examination as the patient was unable to cooperate.   Old right parietal infarct. No acute infarcts. Normal intracranial   circulation, limited MRA of the neck due to motion.    --- End of Report ---            CAITY MCGUIRE MD; Attending Radiologist  This document has been electronically signed. Sep  6 2022  1:41PM    < end of copied text >

## 2022-09-12 NOTE — PROGRESS NOTE ADULT - ATTENDING COMMENTS
73yo M w/ extensive PMHx including DLBCL, G6PD deficiency, complete heart block s/p PPM, HFrEF (new EF 20%), CKD 3b, paroxysmal atrial fibrillation, treated for TLS syndrome w/ rasburicase, complicated by acute hemolytic anemia in setting of G6PD deficiency. Patient also p/w acute hypoxic respiratory failure due to heart failure exacerbation requiring bipap initially. He was eventually titrated off bipap and nasal cannula, now breathing comfortably on room air after diuresis.    Given Obinutuzumab on 9/9.    Per Oncologist, pt to get a Mediport as an outpatient, PICC to be removed.    D/c w f/u. D/c time 40 mins.

## 2022-09-12 NOTE — PROGRESS NOTE ADULT - SUBJECTIVE AND OBJECTIVE BOX
PROGRESS NOTE:   Authored by Dave Vanegas MD   Patient is a 72y old  Male who presents with a chief complaint of lymphoma (07 Sep 2022 07:25)      SUBJECTIVE / OVERNIGHT EVENTS: No acute events overnight. No chest pain,  palpitations, dyspnea, abdominal pain. nausea, vomiting, diarrhea, blurry vision, dysuria, lightheadedness, dizziness.    ADDITIONAL REVIEW OF SYSTEMS:    MEDICATIONS  (STANDING):  acyclovir   Oral Tab/Cap 400 milliGRAM(s) Oral two times a day  allopurinol 100 milliGRAM(s) Oral daily  apixaban 5 milliGRAM(s) Oral two times a day  atorvastatin 40 milliGRAM(s) Oral at bedtime  chlorhexidine 2% Cloths 1 Application(s) Topical daily  chlorhexidine 4% Liquid 1 Application(s) Topical <User Schedule>  dextrose 5%. 1000 milliLiter(s) (100 mL/Hr) IV Continuous <Continuous>  dextrose 5%. 1000 milliLiter(s) (50 mL/Hr) IV Continuous <Continuous>  dextrose 50% Injectable 25 Gram(s) IV Push once  dextrose 50% Injectable 12.5 Gram(s) IV Push once  dextrose 50% Injectable 25 Gram(s) IV Push once  glucagon  Injectable 1 milliGRAM(s) IntraMuscular once  insulin lispro (ADMELOG) corrective regimen sliding scale   SubCutaneous Before meals and at bedtime  metoprolol succinate  milliGRAM(s) Oral daily  multivitamin 1 Tablet(s) Oral daily  sacubitril 49 mG/valsartan 51 mG 1 Tablet(s) Oral every 12 hours  spironolactone 25 milliGRAM(s) Oral daily    MEDICATIONS  (PRN):  dextrose Oral Gel 15 Gram(s) Oral once PRN Blood Glucose LESS THAN 70 milliGRAM(s)/deciliter  melatonin 3 milliGRAM(s) Oral at bedtime PRN Insomnia  sodium chloride 0.9% lock flush 10 milliLiter(s) IV Push every 1 hour PRN Pre/post blood products, medications, blood draw, and to maintain line patency  sodium chloride 0.9% lock flush 10 milliLiter(s) IV Push every 8 hours PRN Pre/post blood products, medications, blood draw, and to maintain line patency  traMADol 25 milliGRAM(s) Oral every 4 hours PRN Moderate Pain (4 - 6)  traMADol 50 milliGRAM(s) Oral every 4 hours PRN Severe Pain (7 - 10)      CAPILLARY BLOOD GLUCOSE      POCT Blood Glucose.: 107 mg/dL (11 Sep 2022 16:47)  POCT Blood Glucose.: 127 mg/dL (11 Sep 2022 11:44)  POCT Blood Glucose.: 102 mg/dL (11 Sep 2022 07:42)    I&O's Summary    11 Sep 2022 07:01  -  12 Sep 2022 06:48  --------------------------------------------------------  IN: 240 mL / OUT: 0 mL / NET: 240 mL        PHYSICAL EXAM:  Vital Signs Last 24 Hrs  T(C): 36.5 (12 Sep 2022 06:27), Max: 36.8 (11 Sep 2022 20:03)  T(F): 97.7 (12 Sep 2022 06:27), Max: 98.2 (11 Sep 2022 20:03)  HR: 63 (12 Sep 2022 06:27) (63 - 86)  BP: 112/72 (12 Sep 2022 06:27) (112/72 - 130/80)  BP(mean): --  RR: 18 (12 Sep 2022 06:27) (18 - 18)  SpO2: 100% (12 Sep 2022 06:27) (98% - 100%)    Parameters below as of 12 Sep 2022 06:27  Patient On (Oxygen Delivery Method): room air      PHYSICAL EXAM:  GENERAL: No apparent distress.   HEAD:  Atraumatic, Normocephalic  EYES: EOMI, PERRLA, conjunctiva and sclera clear  NECK: Supple, no lymphadenopathy, no JVD  CHEST/LUNG: Clear to auscultation bilateral and symmetric; No wheezes, rales, or rhonchi  HEART: S1 normal, loud S2. Regular rate and rhythm; No murmurs, rubs, or gallops  ABDOMEN: Soft, non-tender, non-distended; normal bowel sounds  EXTREMITIES:  2+ peripheral pulses b/l, No clubbing, cyanosis, or edema  NEUROLOGY: A&O x 2, no focal deficits  SKIN: No rashes or lesions. Dressing intact, clean.      LABS:                      RADIOLOGY & ADDITIONAL TESTS:  Lab Results Reviewed   Imaging Reviewed  Electrocardiogram Reviewed

## 2022-09-12 NOTE — PROGRESS NOTE ADULT - NUTRITIONAL ASSESSMENT
This patient has been assessed with a concern for Malnutrition and has been determined to have a diagnosis/diagnoses of Moderate protein-calorie malnutrition.    This patient is being managed with:   Diet DASH/TLC-  Sodium & Cholesterol Restricted  Consistent Carbohydrate {Evening Snack} (CSTCHOSN)  Entered: Aug 27 2022  3:23PM    
This patient has been assessed with a concern for Malnutrition and has been determined to have a diagnosis/diagnoses of Moderate protein-calorie malnutrition.    This patient is being managed with:   Diet DASH/TLC-  Sodium & Cholesterol Restricted  Consistent Carbohydrate {Evening Snack} (CSTCHOSN)  Supplement Feeding Modality:  Oral  Glucerna Shake Cans or Servings Per Day:  1       Frequency:  Daily  Entered: Aug 31 2022  3:36PM    
This patient has been assessed with a concern for Malnutrition and has been determined to have a diagnosis/diagnoses of Moderate protein-calorie malnutrition.    This patient is being managed with:   Diet DASH/TLC-  Sodium & Cholesterol Restricted  Consistent Carbohydrate {Evening Snack} (CSTCHOSN)  Supplement Feeding Modality:  Oral  Glucerna Shake Cans or Servings Per Day:  1       Frequency:  Daily  Entered: Aug 31 2022  3:36PM    
This patient has been assessed with a concern for Malnutrition and has been determined to have a diagnosis/diagnoses of Moderate protein-calorie malnutrition.    This patient is being managed with:   Diet DASH/TLC-  Sodium & Cholesterol Restricted  Consistent Carbohydrate {Evening Snack} (CSTCHOSN)  Supplement Feeding Modality:  Oral  Glucerna Shake Cans or Servings Per Day:  1       Frequency:  Daily  Entered: Sep  7 2022 12:22PM    
This patient has been assessed with a concern for Malnutrition and has been determined to have a diagnosis/diagnoses of Moderate protein-calorie malnutrition.    This patient is being managed with:   Diet DASH/TLC-  Sodium & Cholesterol Restricted  Consistent Carbohydrate {Evening Snack} (CSTCHOSN)  Supplement Feeding Modality:  Oral  Glucerna Shake Cans or Servings Per Day:  1       Frequency:  Daily  Entered: Aug 30 2022 10:16AM    
This patient has been assessed with a concern for Malnutrition and has been determined to have a diagnosis/diagnoses of Moderate protein-calorie malnutrition.    This patient is being managed with:   Diet DASH/TLC-  Sodium & Cholesterol Restricted  Consistent Carbohydrate {Evening Snack} (CSTCHOSN)  Supplement Feeding Modality:  Oral  Glucerna Shake Cans or Servings Per Day:  1       Frequency:  Daily  Entered: Aug 31 2022  3:36PM    
This patient has been assessed with a concern for Malnutrition and has been determined to have a diagnosis/diagnoses of Moderate protein-calorie malnutrition.    This patient is being managed with:   Diet DASH/TLC-  Sodium & Cholesterol Restricted  Consistent Carbohydrate {Evening Snack} (CSTCHOSN)  Supplement Feeding Modality:  Oral  Glucerna Shake Cans or Servings Per Day:  1       Frequency:  Daily  Entered: Aug 31 2022  3:36PM    
This patient has been assessed with a concern for Malnutrition and has been determined to have a diagnosis/diagnoses of Moderate protein-calorie malnutrition.    This patient is being managed with:   Diet NPO after Midnight-     NPO Start Date: 06-Sep-2022   NPO Start Time: 23:59  Entered: Sep  6 2022 12:38PM    Diet DASH/TLC-  Sodium & Cholesterol Restricted  Consistent Carbohydrate {Evening Snack} (CSTCHOSN)  Supplement Feeding Modality:  Oral  Glucerna Shake Cans or Servings Per Day:  1       Frequency:  Daily  Entered: Aug 31 2022  3:36PM    
This patient has been assessed with a concern for Malnutrition and has been determined to have a diagnosis/diagnoses of Moderate protein-calorie malnutrition.    This patient is being managed with:   Diet DASH/TLC-  Sodium & Cholesterol Restricted  Consistent Carbohydrate {Evening Snack} (CSTCHOSN)  Supplement Feeding Modality:  Oral  Glucerna Shake Cans or Servings Per Day:  1       Frequency:  Daily  Entered: Aug 30 2022 10:16AM    
This patient has been assessed with a concern for Malnutrition and has been determined to have a diagnosis/diagnoses of Moderate protein-calorie malnutrition.    This patient is being managed with:   Diet DASH/TLC-  Sodium & Cholesterol Restricted  Consistent Carbohydrate {Evening Snack} (CSTCHOSN)  Supplement Feeding Modality:  Oral  Glucerna Shake Cans or Servings Per Day:  1       Frequency:  Daily  Entered: Aug 31 2022  3:36PM    
This patient has been assessed with a concern for Malnutrition and has been determined to have a diagnosis/diagnoses of Moderate protein-calorie malnutrition.    This patient is being managed with:   Diet DASH/TLC-  Sodium & Cholesterol Restricted  Consistent Carbohydrate {Evening Snack} (CSTCHOSN)  Entered: Aug 27 2022  3:23PM    
This patient has been assessed with a concern for Malnutrition and has been determined to have a diagnosis/diagnoses of Moderate protein-calorie malnutrition.    This patient is being managed with:   Diet NPO after Midnight-     NPO Start Date: 06-Sep-2022   NPO Start Time: 23:59  Entered: Sep  6 2022 12:38PM    Diet DASH/TLC-  Sodium & Cholesterol Restricted  Consistent Carbohydrate {Evening Snack} (CSTCHOSN)  Supplement Feeding Modality:  Oral  Glucerna Shake Cans or Servings Per Day:  1       Frequency:  Daily  Entered: Aug 31 2022  3:36PM    
This patient has been assessed with a concern for Malnutrition and has been determined to have a diagnosis/diagnoses of Moderate protein-calorie malnutrition.    This patient is being managed with:   Diet DASH/TLC-  Sodium & Cholesterol Restricted  Consistent Carbohydrate {Evening Snack} (CSTCHOSN)  Supplement Feeding Modality:  Oral  Glucerna Shake Cans or Servings Per Day:  1       Frequency:  Daily  Entered: Sep  7 2022 12:22PM    
This patient has been assessed with a concern for Malnutrition and has been determined to have a diagnosis/diagnoses of Moderate protein-calorie malnutrition.    This patient is being managed with:   Diet NPO after Midnight-     NPO Start Date: 06-Sep-2022   NPO Start Time: 23:59  Entered: Sep  6 2022 12:38PM    Diet DASH/TLC-  Sodium & Cholesterol Restricted  Consistent Carbohydrate {Evening Snack} (CSTCHOSN)  Supplement Feeding Modality:  Oral  Glucerna Shake Cans or Servings Per Day:  1       Frequency:  Daily  Entered: Aug 31 2022  3:36PM    
This patient has been assessed with a concern for Malnutrition and has been determined to have a diagnosis/diagnoses of Moderate protein-calorie malnutrition.    This patient is being managed with:   Diet DASH/TLC-  Sodium & Cholesterol Restricted  Consistent Carbohydrate {Evening Snack} (CSTCHOSN)  Supplement Feeding Modality:  Oral  Glucerna Shake Cans or Servings Per Day:  1       Frequency:  Daily  Entered: Sep  7 2022 12:22PM    
This patient has been assessed with a concern for Malnutrition and has been determined to have a diagnosis/diagnoses of Moderate protein-calorie malnutrition.    This patient is being managed with:   Diet DASH/TLC-  Sodium & Cholesterol Restricted  Consistent Carbohydrate {Evening Snack} (CSTCHOSN)  Supplement Feeding Modality:  Oral  Glucerna Shake Cans or Servings Per Day:  1       Frequency:  Daily  Entered: Sep  7 2022 12:22PM    
This patient has been assessed with a concern for Malnutrition and has been determined to have a diagnosis/diagnoses of Moderate protein-calorie malnutrition.    This patient is being managed with:   Diet DASH/TLC-  Sodium & Cholesterol Restricted  Consistent Carbohydrate {Evening Snack} (CSTCHOSN)  Supplement Feeding Modality:  Oral  Glucerna Shake Cans or Servings Per Day:  1       Frequency:  Daily  Entered: Aug 30 2022 10:16AM    
This patient has been assessed with a concern for Malnutrition and has been determined to have a diagnosis/diagnoses of Moderate protein-calorie malnutrition.    This patient is being managed with:   Diet DASH/TLC-  Sodium & Cholesterol Restricted  Consistent Carbohydrate {Evening Snack} (CSTCHOSN)  Supplement Feeding Modality:  Oral  Glucerna Shake Cans or Servings Per Day:  1       Frequency:  Daily  Entered: Sep  7 2022 12:22PM    
This patient has been assessed with a concern for Malnutrition and has been determined to have a diagnosis/diagnoses of Moderate protein-calorie malnutrition.    This patient is being managed with:   Diet DASH/TLC-  Sodium & Cholesterol Restricted  Consistent Carbohydrate {Evening Snack} (CSTCHOSN)  Supplement Feeding Modality:  Oral  Glucerna Shake Cans or Servings Per Day:  1       Frequency:  Daily  Entered: Sep  7 2022 12:22PM

## 2022-09-12 NOTE — PROGRESS NOTE ADULT - ASSESSMENT
72 year old male with HTN, CKD stage II, complete heart block (PPM in place), HLD, HFrEF (45% in 2019), and DLBCL (tx with R-CHOP in 2003, with relapse in 2009 treated with BR) now with recently diagnosed grade 3 follicular lymphoma on 8/23 who presents with anemia and SOB, patient was pending a surgical biopsy on 8/25. Admitted for TLS and rasburicase-triggered G6PD hemolytic anemia. Given that patient has developed confusion this admission and is now AOx1, primary team obtained a CTH, which shows acute/subacute R parietal infarction (larger in size when compared with 5/2019), chronic lacunar infarct in R basal ganglia, mild chronic white matter microvascular disease, no hemorrhagic transformation. Denies headache, weakness, numbness, dizziness, visual disturbances. NIHSS 2-did not know month and age. Of note, patient has had multiple prior episodes of confusion and cognitive difficulties.     CTH, which shows acute/subacute R parietal infarction, chronic R BG infarct noted   LDL 81   A1c 5.4   s/p excisional bx 9/7   MRI/A brain done: limited; Old R parietal infarct, vessels okay   9/10 security called, agitation, tried to leave, given ativan   Impression:  R parietal infarction,  likely 2/2 AF     Recommendations:   - got ativan for agitation.   - monitor QTC. was > 500 avoid antipsychotics   - s/p bx 9/8, back on DOAC, eliquis 5mg BID for AF    - BP normotension  - heme/onc wants to hold patient for obinutuzumab Friday   - High dose statin therapy - atorvastatin 40mg PO daily. LDL goal <70mg/dL.  - TTE, no need from stroke standpoint   - telemetry  - PT/OT/SS/SLP, OOBC  - check FS, glucose control <180  - GI/DVT ppx  - Thank you for allowing me to participate in the care of this patient. Call with questions.  - d/c plan when able in progress   Stone Mo MD  Vascular Neurology  Office: [phone].

## 2022-09-13 ENCOUNTER — NON-APPOINTMENT (OUTPATIENT)
Age: 72
End: 2022-09-13

## 2022-09-14 NOTE — ASSESSMENT
Patient advised we are awaiting new covid vaccine.     All questions answered.    [FreeTextEntry1] : All medications were reviewed with patient.\par pt is not taking lasix, aldactone, and isosorbide.\par CHF-stable.  pt will discuss with card about his meds.\par Continue same blood pressure medication.  Follow blood pressure.\par BUN/creat\par follow A1C\par Continue same cholesterol medication.  Follow lipid.\par Blood was drawn at office today.\par \par

## 2022-09-15 LAB
CHROM ANALY OVERALL INTERP SPEC-IMP: SIGNIFICANT CHANGE UP
HEMATOPATHOLOGY REPORT: SIGNIFICANT CHANGE UP

## 2022-09-16 ENCOUNTER — RESULT REVIEW (OUTPATIENT)
Age: 72
End: 2022-09-16

## 2022-09-16 ENCOUNTER — APPOINTMENT (OUTPATIENT)
Dept: HEMATOLOGY ONCOLOGY | Facility: CLINIC | Age: 72
End: 2022-09-16

## 2022-09-16 ENCOUNTER — APPOINTMENT (OUTPATIENT)
Dept: SURGICAL ONCOLOGY | Facility: HOSPITAL | Age: 72
End: 2022-09-16

## 2022-09-16 ENCOUNTER — INPATIENT (INPATIENT)
Facility: HOSPITAL | Age: 72
LOS: 16 days | Discharge: HOME | DRG: 840 | End: 2022-10-03
Attending: STUDENT IN AN ORGANIZED HEALTH CARE EDUCATION/TRAINING PROGRAM | Admitting: INTERNAL MEDICINE
Payer: COMMERCIAL

## 2022-09-16 ENCOUNTER — APPOINTMENT (OUTPATIENT)
Dept: INFUSION THERAPY | Facility: HOSPITAL | Age: 72
End: 2022-09-16

## 2022-09-16 ENCOUNTER — NON-APPOINTMENT (OUTPATIENT)
Age: 72
End: 2022-09-16

## 2022-09-16 VITALS
HEART RATE: 82 BPM | OXYGEN SATURATION: 96 % | HEIGHT: 72.25 IN | DIASTOLIC BLOOD PRESSURE: 74 MMHG | WEIGHT: 160.06 LBS | RESPIRATION RATE: 16 BRPM | SYSTOLIC BLOOD PRESSURE: 136 MMHG | TEMPERATURE: 98 F

## 2022-09-16 DIAGNOSIS — R41.82 ALTERED MENTAL STATUS, UNSPECIFIED: ICD-10-CM

## 2022-09-16 DIAGNOSIS — Z29.9 ENCOUNTER FOR PROPHYLACTIC MEASURES, UNSPECIFIED: ICD-10-CM

## 2022-09-16 DIAGNOSIS — C85.90 NON-HODGKIN LYMPHOMA, UNSPECIFIED, UNSPECIFIED SITE: Chronic | ICD-10-CM

## 2022-09-16 DIAGNOSIS — J90 PLEURAL EFFUSION, NOT ELSEWHERE CLASSIFIED: ICD-10-CM

## 2022-09-16 DIAGNOSIS — E11.9 TYPE 2 DIABETES MELLITUS WITHOUT COMPLICATIONS: ICD-10-CM

## 2022-09-16 DIAGNOSIS — Z95.0 PRESENCE OF CARDIAC PACEMAKER: Chronic | ICD-10-CM

## 2022-09-16 DIAGNOSIS — I48.91 UNSPECIFIED ATRIAL FIBRILLATION: ICD-10-CM

## 2022-09-16 DIAGNOSIS — C82.90 FOLLICULAR LYMPHOMA, UNSPECIFIED, UNSPECIFIED SITE: ICD-10-CM

## 2022-09-16 DIAGNOSIS — I50.22 CHRONIC SYSTOLIC (CONGESTIVE) HEART FAILURE: ICD-10-CM

## 2022-09-16 LAB
ANISOCYTOSIS BLD QL: SLIGHT — SIGNIFICANT CHANGE UP
APPEARANCE UR: CLEAR — SIGNIFICANT CHANGE UP
APTT BLD: 31.7 SEC — SIGNIFICANT CHANGE UP (ref 27.5–35.5)
BACTERIA # UR AUTO: NEGATIVE — SIGNIFICANT CHANGE UP
BASE EXCESS BLDV CALC-SCNC: -2.4 MMOL/L — LOW (ref -2–3)
BASOPHILS # BLD AUTO: 0.02 K/UL — SIGNIFICANT CHANGE UP (ref 0–0.2)
BASOPHILS # BLD AUTO: 0.05 K/UL — SIGNIFICANT CHANGE UP (ref 0–0.2)
BASOPHILS NFR BLD AUTO: 0.8 % — SIGNIFICANT CHANGE UP (ref 0–2)
BASOPHILS NFR BLD AUTO: 1.4 % — SIGNIFICANT CHANGE UP (ref 0–2)
BILIRUB UR-MCNC: NEGATIVE — SIGNIFICANT CHANGE UP
CA-I SERPL-SCNC: 1.22 MMOL/L — SIGNIFICANT CHANGE UP (ref 1.15–1.33)
CHLORIDE BLDV-SCNC: 107 MMOL/L — SIGNIFICANT CHANGE UP (ref 96–108)
CO2 BLDV-SCNC: 26 MMOL/L — SIGNIFICANT CHANGE UP (ref 22–26)
COLOR SPEC: YELLOW — SIGNIFICANT CHANGE UP
DACRYOCYTES BLD QL SMEAR: SLIGHT — SIGNIFICANT CHANGE UP
DIFF PNL FLD: NEGATIVE — SIGNIFICANT CHANGE UP
ELLIPTOCYTES BLD QL SMEAR: SLIGHT — SIGNIFICANT CHANGE UP
EOSINOPHIL # BLD AUTO: 0.13 K/UL — SIGNIFICANT CHANGE UP (ref 0–0.5)
EOSINOPHIL # BLD AUTO: 0.2 K/UL — SIGNIFICANT CHANGE UP (ref 0–0.5)
EOSINOPHIL NFR BLD AUTO: 4.3 % — SIGNIFICANT CHANGE UP (ref 0–6)
EOSINOPHIL NFR BLD AUTO: 5.7 % — SIGNIFICANT CHANGE UP (ref 0–6)
EPI CELLS # UR: 1 /HPF — SIGNIFICANT CHANGE UP
GAS PNL BLDV: 139 MMOL/L — SIGNIFICANT CHANGE UP (ref 136–145)
GAS PNL BLDV: SIGNIFICANT CHANGE UP
GAS PNL BLDV: SIGNIFICANT CHANGE UP
GIANT PLATELETS BLD QL SMEAR: PRESENT — SIGNIFICANT CHANGE UP
GLUCOSE BLDV-MCNC: 106 MG/DL — HIGH (ref 70–99)
GLUCOSE UR QL: NEGATIVE — SIGNIFICANT CHANGE UP
HCO3 BLDV-SCNC: 24 MMOL/L — SIGNIFICANT CHANGE UP (ref 22–29)
HCT VFR BLD CALC: 30 % — LOW (ref 39–50)
HCT VFR BLD CALC: 33.5 % — LOW (ref 39–50)
HCT VFR BLDA CALC: 31 % — LOW (ref 39–51)
HGB BLD CALC-MCNC: 10.2 G/DL — LOW (ref 12.6–17.4)
HGB BLD-MCNC: 9.3 G/DL — LOW (ref 13–17)
HGB BLD-MCNC: 9.7 G/DL — LOW (ref 13–17)
HYALINE CASTS # UR AUTO: 2 /LPF — SIGNIFICANT CHANGE UP (ref 0–2)
IMM GRANULOCYTES NFR BLD AUTO: 0.6 % — SIGNIFICANT CHANGE UP (ref 0–0.9)
INR BLD: 1.34 RATIO — HIGH (ref 0.88–1.16)
KETONES UR-MCNC: NEGATIVE — SIGNIFICANT CHANGE UP
LACTATE BLDV-MCNC: 2.4 MMOL/L — HIGH (ref 0.5–2)
LEUKOCYTE ESTERASE UR-ACNC: NEGATIVE — SIGNIFICANT CHANGE UP
LYMPHOCYTES # BLD AUTO: 0.08 K/UL — LOW (ref 1–3.3)
LYMPHOCYTES # BLD AUTO: 0.23 K/UL — LOW (ref 1–3.3)
LYMPHOCYTES # BLD AUTO: 2.6 % — LOW (ref 13–44)
LYMPHOCYTES # BLD AUTO: 6.5 % — LOW (ref 13–44)
MACROCYTES BLD QL: SLIGHT — SIGNIFICANT CHANGE UP
MANUAL SMEAR VERIFICATION: SIGNIFICANT CHANGE UP
MCHC RBC-ENTMCNC: 26.1 PG — LOW (ref 27–34)
MCHC RBC-ENTMCNC: 27.2 PG — SIGNIFICANT CHANGE UP (ref 27–34)
MCHC RBC-ENTMCNC: 29 GM/DL — LOW (ref 32–36)
MCHC RBC-ENTMCNC: 31 G/DL — LOW (ref 32–36)
MCV RBC AUTO: 87.7 FL — SIGNIFICANT CHANGE UP (ref 80–100)
MCV RBC AUTO: 90.3 FL — SIGNIFICANT CHANGE UP (ref 80–100)
MONOCYTES # BLD AUTO: 0.16 K/UL — SIGNIFICANT CHANGE UP (ref 0–0.9)
MONOCYTES # BLD AUTO: 0.4 K/UL — SIGNIFICANT CHANGE UP (ref 0–0.9)
MONOCYTES NFR BLD AUTO: 11.3 % — SIGNIFICANT CHANGE UP (ref 2–14)
MONOCYTES NFR BLD AUTO: 5.1 % — SIGNIFICANT CHANGE UP (ref 2–14)
NEUTROPHILS # BLD AUTO: 2.63 K/UL — SIGNIFICANT CHANGE UP (ref 1.8–7.4)
NEUTROPHILS # BLD AUTO: 2.72 K/UL — SIGNIFICANT CHANGE UP (ref 1.8–7.4)
NEUTROPHILS NFR BLD AUTO: 74.5 % — SIGNIFICANT CHANGE UP (ref 43–77)
NEUTROPHILS NFR BLD AUTO: 87.2 % — HIGH (ref 43–77)
NITRITE UR-MCNC: NEGATIVE — SIGNIFICANT CHANGE UP
NRBC # BLD: 0 /100 WBCS — SIGNIFICANT CHANGE UP (ref 0–0)
NT-PROBNP SERPL-SCNC: HIGH PG/ML (ref 0–300)
PCO2 BLDV: 51 MMHG — SIGNIFICANT CHANGE UP (ref 42–55)
PH BLDV: 7.29 — LOW (ref 7.32–7.43)
PH UR: 5.5 — SIGNIFICANT CHANGE UP (ref 5–8)
PLAT MORPH BLD: NORMAL — SIGNIFICANT CHANGE UP
PLATELET # BLD AUTO: 104 K/UL — LOW (ref 150–400)
PLATELET # BLD AUTO: 120 K/UL — LOW (ref 150–400)
PO2 BLDV: 16 MMHG — LOW (ref 25–45)
POIKILOCYTOSIS BLD QL AUTO: SLIGHT — SIGNIFICANT CHANGE UP
POLYCHROMASIA BLD QL SMEAR: SLIGHT — SIGNIFICANT CHANGE UP
POTASSIUM BLDV-SCNC: 4.8 MMOL/L — SIGNIFICANT CHANGE UP (ref 3.5–5.1)
PROT UR-MCNC: ABNORMAL
PROTHROM AB SERPL-ACNC: 15.4 SEC — HIGH (ref 10.5–13.4)
RAPID RVP RESULT: DETECTED
RBC # BLD: 3.42 M/UL — LOW (ref 4.2–5.8)
RBC # BLD: 3.71 M/UL — LOW (ref 4.2–5.8)
RBC # FLD: 19.6 % — HIGH (ref 10.3–14.5)
RBC # FLD: 19.6 % — HIGH (ref 10.3–14.5)
RBC BLD AUTO: ABNORMAL
RBC CASTS # UR COMP ASSIST: 6 /HPF — HIGH (ref 0–4)
RV+EV RNA SPEC QL NAA+PROBE: DETECTED
SAO2 % BLDV: 14.5 % — LOW (ref 67–88)
SARS-COV-2 RNA SPEC QL NAA+PROBE: SIGNIFICANT CHANGE UP
SP GR SPEC: 1.02 — SIGNIFICANT CHANGE UP (ref 1.01–1.02)
TROPONIN T, HIGH SENSITIVITY RESULT: 41 NG/L — SIGNIFICANT CHANGE UP (ref 0–51)
UROBILINOGEN FLD QL: NEGATIVE — SIGNIFICANT CHANGE UP
WBC # BLD: 3.12 K/UL — LOW (ref 3.8–10.5)
WBC # BLD: 3.53 K/UL — LOW (ref 3.8–10.5)
WBC # FLD AUTO: 3.12 K/UL — LOW (ref 3.8–10.5)
WBC # FLD AUTO: 3.53 K/UL — LOW (ref 3.8–10.5)
WBC UR QL: 2 /HPF — SIGNIFICANT CHANGE UP (ref 0–5)

## 2022-09-16 PROCEDURE — 99223 1ST HOSP IP/OBS HIGH 75: CPT | Mod: GC

## 2022-09-16 PROCEDURE — 99285 EMERGENCY DEPT VISIT HI MDM: CPT

## 2022-09-16 PROCEDURE — 93010 ELECTROCARDIOGRAM REPORT: CPT

## 2022-09-16 PROCEDURE — 99232 SBSQ HOSP IP/OBS MODERATE 35: CPT

## 2022-09-16 PROCEDURE — 71045 X-RAY EXAM CHEST 1 VIEW: CPT | Mod: 26

## 2022-09-16 PROCEDURE — 70450 CT HEAD/BRAIN W/O DYE: CPT | Mod: 26

## 2022-09-16 RX ORDER — SODIUM CHLORIDE 9 MG/ML
500 INJECTION INTRAMUSCULAR; INTRAVENOUS; SUBCUTANEOUS ONCE
Refills: 0 | Status: COMPLETED | OUTPATIENT
Start: 2022-09-16 | End: 2022-09-16

## 2022-09-16 RX ORDER — ALLOPURINOL 300 MG
1 TABLET ORAL
Qty: 0 | Refills: 0 | DISCHARGE

## 2022-09-16 RX ORDER — ACYCLOVIR SODIUM 500 MG
400 VIAL (EA) INTRAVENOUS
Refills: 0 | Status: DISCONTINUED | OUTPATIENT
Start: 2022-09-16 | End: 2022-10-03

## 2022-09-16 RX ORDER — SENNA PLUS 8.6 MG/1
2 TABLET ORAL AT BEDTIME
Refills: 0 | Status: DISCONTINUED | OUTPATIENT
Start: 2022-09-16 | End: 2022-10-03

## 2022-09-16 RX ORDER — ATORVASTATIN CALCIUM 80 MG/1
40 TABLET, FILM COATED ORAL AT BEDTIME
Refills: 0 | Status: DISCONTINUED | OUTPATIENT
Start: 2022-09-16 | End: 2022-10-03

## 2022-09-16 RX ORDER — SPIRONOLACTONE 25 MG/1
25 TABLET, FILM COATED ORAL
Refills: 0 | Status: DISCONTINUED | OUTPATIENT
Start: 2022-09-16 | End: 2022-10-03

## 2022-09-16 RX ORDER — DEXTROSE 50 % IN WATER 50 %
25 SYRINGE (ML) INTRAVENOUS ONCE
Refills: 0 | Status: DISCONTINUED | OUTPATIENT
Start: 2022-09-16 | End: 2022-09-16

## 2022-09-16 RX ORDER — PIPERACILLIN AND TAZOBACTAM 4; .5 G/20ML; G/20ML
3.38 INJECTION, POWDER, LYOPHILIZED, FOR SOLUTION INTRAVENOUS ONCE
Refills: 0 | Status: COMPLETED | OUTPATIENT
Start: 2022-09-16 | End: 2022-09-16

## 2022-09-16 RX ORDER — GLUCAGON INJECTION, SOLUTION 0.5 MG/.1ML
1 INJECTION, SOLUTION SUBCUTANEOUS ONCE
Refills: 0 | Status: DISCONTINUED | OUTPATIENT
Start: 2022-09-16 | End: 2022-09-16

## 2022-09-16 RX ORDER — SODIUM CHLORIDE 9 MG/ML
1000 INJECTION, SOLUTION INTRAVENOUS
Refills: 0 | Status: DISCONTINUED | OUTPATIENT
Start: 2022-09-16 | End: 2022-09-16

## 2022-09-16 RX ORDER — SACUBITRIL AND VALSARTAN 24; 26 MG/1; MG/1
1 TABLET, FILM COATED ORAL
Refills: 0 | Status: DISCONTINUED | OUTPATIENT
Start: 2022-09-17 | End: 2022-10-03

## 2022-09-16 RX ORDER — VANCOMYCIN HCL 1 G
1000 VIAL (EA) INTRAVENOUS ONCE
Refills: 0 | Status: COMPLETED | OUTPATIENT
Start: 2022-09-16 | End: 2022-09-16

## 2022-09-16 RX ORDER — DEXTROSE 50 % IN WATER 50 %
15 SYRINGE (ML) INTRAVENOUS ONCE
Refills: 0 | Status: DISCONTINUED | OUTPATIENT
Start: 2022-09-16 | End: 2022-09-16

## 2022-09-16 RX ORDER — METOPROLOL TARTRATE 50 MG
100 TABLET ORAL DAILY
Refills: 0 | Status: DISCONTINUED | OUTPATIENT
Start: 2022-09-17 | End: 2022-10-03

## 2022-09-16 RX ORDER — DEXTROSE 50 % IN WATER 50 %
12.5 SYRINGE (ML) INTRAVENOUS ONCE
Refills: 0 | Status: DISCONTINUED | OUTPATIENT
Start: 2022-09-16 | End: 2022-09-16

## 2022-09-16 RX ORDER — ALLOPURINOL 300 MG
100 TABLET ORAL DAILY
Refills: 0 | Status: DISCONTINUED | OUTPATIENT
Start: 2022-09-16 | End: 2022-10-03

## 2022-09-16 RX ORDER — CHLORHEXIDINE GLUCONATE 213 G/1000ML
1 SOLUTION TOPICAL DAILY
Refills: 0 | Status: DISCONTINUED | OUTPATIENT
Start: 2022-09-16 | End: 2022-09-20

## 2022-09-16 RX ORDER — INSULIN LISPRO 100/ML
VIAL (ML) SUBCUTANEOUS
Refills: 0 | Status: DISCONTINUED | OUTPATIENT
Start: 2022-09-16 | End: 2022-09-16

## 2022-09-16 RX ORDER — APIXABAN 2.5 MG/1
5 TABLET, FILM COATED ORAL EVERY 12 HOURS
Refills: 0 | Status: DISCONTINUED | OUTPATIENT
Start: 2022-09-16 | End: 2022-09-23

## 2022-09-16 RX ADMIN — Medication 250 MILLIGRAM(S): at 18:07

## 2022-09-16 RX ADMIN — Medication 400 MILLIGRAM(S): at 22:38

## 2022-09-16 RX ADMIN — ATORVASTATIN CALCIUM 40 MILLIGRAM(S): 80 TABLET, FILM COATED ORAL at 22:30

## 2022-09-16 RX ADMIN — PIPERACILLIN AND TAZOBACTAM 200 GRAM(S): 4; .5 INJECTION, POWDER, LYOPHILIZED, FOR SOLUTION INTRAVENOUS at 16:43

## 2022-09-16 RX ADMIN — APIXABAN 5 MILLIGRAM(S): 2.5 TABLET, FILM COATED ORAL at 22:38

## 2022-09-16 RX ADMIN — SODIUM CHLORIDE 500 MILLILITER(S): 9 INJECTION INTRAMUSCULAR; INTRAVENOUS; SUBCUTANEOUS at 14:21

## 2022-09-16 NOTE — PATIENT PROFILE ADULT - FALL HARM RISK - HARM RISK INTERVENTIONS

## 2022-09-16 NOTE — ED ADULT NURSE NOTE - NSICDXPASTMEDICALHX_GEN_ALL_CORE_FT
PAST MEDICAL HISTORY:  Atrial flutter, unspecified type     DM type 2 (diabetes mellitus, type 2) Never on insulin    Essential hypertension     Impaired memory     Moderate mitral regurgitation     Non-Hodgkins Lymphoma In [facility] for > 10 years    Pleural effusion     Rhinitis, allergic     Systolic heart failure

## 2022-09-16 NOTE — ED PROVIDER NOTE - CLINICAL SUMMARY MEDICAL DECISION MAKING FREE TEXT BOX
Attending/MD Stan. 73 yo M, with NHL, CAD, PPD, p/w AMS, sent in from [facility], pt was AO x4 3 wks ago, concerning for neuro lymphoma vs. medication induced encephalopathy, labs, ua, ct head, admission, Hem onc team is at the bedside.

## 2022-09-16 NOTE — H&P ADULT - NSHPSOCIALHISTORY_GEN_ALL_CORE
- He is currently retired. He used to be an .  - Former smoker. He smoked for 10 years less than 1 pack a day. He quit 20 years ago    Family hx  - Two brothers non Hodgkin lymphoma. At least one required chemotherapy. sister had cervical cancer first diagnosed 2007 and then 3 years later with more cancer discovered  - Mother and father passed away from heart disease and diabetes.

## 2022-09-16 NOTE — ED PROVIDER NOTE - PHYSICAL EXAMINATION
NAD. Anemic conjunctiva and skin. VSS. Afebrile. A&Ox2. Neck supple. Lungs wheezing bilaterally. ABD soft, non tender. No CVA tender. +Move all extremities.

## 2022-09-16 NOTE — H&P ADULT - NSHPREVIEWOFSYSTEMS_GEN_ALL_CORE
Constitutional:  (-) fever, (-) chills, (-) lethargy  Eyes:  (-) eye pain (-) visual changes  ENMT: (-) nasal discharge, (-) sore throat. (-) neck pain or stiffness  Cardiac: (-) chest pain (-) palpitations  Respiratory:  (+) cough (-) respiratory distress  GI:  (-) nausea (-) vomiting (-) diarrhea (-) abdominal pain.  :  (-) dysuria (-) frequency (-) burning.  MS:  (-) back pain (-) joint pain.  Neuro:  (-) headache (-) numbness (-) tingling (-) focal weakness  Skin:  (-) rash (-) petichiae   Except as documented in the HPI,  all other systems are negative

## 2022-09-16 NOTE — ED PROVIDER NOTE - OBJECTIVE STATEMENT
73yo male pt with PMHx of Non-Hodgkins Lymphoma, Atrial flutter on Eliquis, DM type 2, Hypertension, Impaired Memory, Moderate Mitral Regurgitation, Pleural Effusion, Systolic Heart Failure, PAD, Cardiomyopathy, CAD, Anemia was transferred from [facility] for further evaluation. Pt's poor historian, A&Ox2, and unable to obtain information from pt. Pt denies any pain or discomfort.

## 2022-09-16 NOTE — ED PROVIDER NOTE - NSICDXPASTMEDICALHX_GEN_ALL_CORE_FT
PAST MEDICAL HISTORY:  Atrial flutter, unspecified type     DM type 2 (diabetes mellitus, type 2) Never on insulin    Essential hypertension     Impaired memory     Moderate mitral regurgitation     Non-Hodgkins Lymphoma In [facility] for > 10 years    Pleural effusion     Rhinitis, allergic     Systolic heart failure

## 2022-09-16 NOTE — H&P ADULT - HISTORY OF PRESENT ILLNESS
72 year old male with afib (on eliquis), HTN,  complete heart block s/p PPM, HLD, HFrEF (30% in 09/2022), and DLBCL (tx with R-CHOP in 2003, with relapse in 2009 treated with BR) now with recently diagnosed grade 3 follicular lymphoma who was transferred from [facility] for AMS. He presented for treatment at [facility] and  was later found wandering in the parking lot confused.     In the ED, patient afebrile, HR 82, /74, RR 16 (96% on room air). On my eval, patient oriented to name and place, follows commands, but appears confused, with poor concentration, attention, and memory. Therefore, unable to have a reliable history. He reported cough for the past couple of days which was worse yesterday. He denied fever, chills, chest pain, nausea, vomiting, abdominal pain, diarrhea, urinary symptoms. CTH without acute findings, X-ray with mildly increased right loculated effusion. RVP + for rhino/enterovirus.      Of note, patient with recent admission for TLS and rasburicase-triggered G6PD hemolytic anemia. During that admission, patient had episodes of confusion, had CTH obtained that showed acute/subacute right-sided parietal lobe infarction chronic lacunar infarct in R basal ganglia, mild chronic white matter microvascular disease, no hemorrhagic transformation.     PMD: Dr Amari Hickman  Endo: Dr Carter Vigil  Cardiologist: Dr. Don [phone] fax [phone]    72 year old male with afib (on eliquis), HTN,  complete heart block s/p PPM, HLD, HFrEF (30% in 09/2022), and DLBCL (tx with R-CHOP in 2003, with relapse in 2009 treated with BR) now with recently diagnosed grade 3 follicular lymphoma who was transferred from [facility] for AMS. He presented for treatment at [facility] and  was later found wandering in the parking lot confused.      In the ED, patient afebrile, HR 82, /74, RR 16 (96% on room air). On my eval, patient oriented to name and place, follows commands, but appears confused, with poor concentration, attention, and memory. Therefore, unable to have a reliable history. He reported cough for the past couple of days which was worse yesterday. He denied fever, chills, chest pain, nausea, vomiting, abdominal pain, diarrhea, urinary symptoms. CTH without acute findings, X-ray with mildly increased right loculated effusion. RVP + for rhino/enterovirus.      Of note, patient with recent admission for TLS and rasburicase-triggered G6PD hemolytic anemia. During that admission, patient had episodes of confusion, had CTH obtained that showed acute/subacute right-sided parietal lobe infarction chronic lacunar infarct in R basal ganglia, mild chronic white matter microvascular disease, no hemorrhagic transformation.     PMD: Dr Amari Hickman  Endo: Dr Carter Vigil  Cardiologist: Dr. Don [phone] fax [phone]    72 year old male with afib (on eliquis), HTN,  complete heart block s/p PPM, HLD, HFrEF (30% in 09/2022), and DLBCL (tx with R-CHOP in 2003, with relapse in 2009 treated with BR) now with recently diagnosed grade 3 follicular lymphoma who was transferred from [facility] for AMS. He presented for treatment at [facility] and  was later found wandering in the parking lot confused. Per daughter, this morning, patient was somewhat agitated and slightly confused this morning but was reorientable.    In the ED, patient afebrile, HR 82, /74, RR 16 (96% on room air). On my eval, patient oriented to name and place, follows commands, but appears confused, with poor concentration, attention, and memory. Therefore, unable to have a reliable history. He reported cough for the past couple of days which was worse yesterday. He denied fever, chills, chest pain, nausea, vomiting, abdominal pain, diarrhea, urinary symptoms. CTH without acute findings, X-ray with mildly increased right loculated effusion. RVP + for rhino/enterovirus.      Of note, patient with recent admission for TLS and rasburicase-triggered G6PD hemolytic anemia. During that admission, patient had episodes of confusion, had CTH obtained that showed acute/subacute right-sided parietal lobe infarction chronic lacunar infarct in R basal ganglia, mild chronic white matter microvascular disease, no hemorrhagic transformation.     PMD: Dr Amari Hickman  Endo: Dr Carter Vigil  Cardiologist: Dr. Don [phone] fax [phone]

## 2022-09-16 NOTE — H&P ADULT - ATTENDING COMMENTS
72 year old male with a PMHx as described above and significant for HFrEF (30% in 09/2022), hx of CBH, s/p CRT ([employer]), DLBCL relapsed, and recently diagnosed grade 3 follicular lymphoma who was transferred from [facility] for [facility]. He was found to have acute rhinovirus/enterovirus as well as CXR findings of loculated effusion. Pt has a chronic history of loculated effusion, dating back from at least 2016 (from CXRs/CTs available on our system); however, one CXR from Sept 2022 did show this had relatively cleared.      #metabolic encephalopathy    #acute upper respiratory viral illness   #DLBCL     Agree with supportive care for now, and CT chest to further characterize the loculated effusion. Agree with holding off on further abx for now, pending CT finding. Rest of plan as per resident note.

## 2022-09-16 NOTE — H&P ADULT - PROBLEM SELECTOR PLAN 1
- At encounter, patient A&Ox2. Daughter notes that patient is A&Ox3 at baseline but had episodes of confusion at times, mostly during last hospitalization.   - Etiology may be underlying dementia, delirium, vs metabolic causes vs neurological vs malignancy  - CTH stable chronic infarct without acute findings  - New cough and RVP positive for rhino/enteroviral pneumonia is c/w viral pneumonia which may be contributory.   - Also consider CNS involvement in lymphoma with leptomeningeal disease  Plan:  - Supportive care of rhino-enteroviral pneumonia  - Follow up rest of infectious work up: blood cultures, urine legionella and strep   - Check syphilis screen, TSH, B12  - Consider LP to eval for encephalitis vs leptomeningeal disease - At encounter, patient A&Ox2. Daughter notes that patient is A&Ox3 at baseline but had episodes of confusion at times, mostly during last hospitalization.   - Etiology may be underlying dementia, delirium, vs metabolic causes vs neurological vs malignancy  - CTH stable chronic infarct without acute findings  - New cough and RVP positive for rhino/enteroviral pneumonia is c/w viral pneumonia which may be contributory.   - Also consider CNS involvement in lymphoma with leptomeningeal disease  Plan:  - Supportive care of rhino-enteroviral pneumonia  - CXR with loculated effusion minimally increased from 08/23. However, not seen on CXR from 09/2022. Will obtain CT chest to further eval   - Follow up rest of infectious work up: blood cultures, urine legionella and strep   - Check syphilis screen, TSH, B12  - Consider LP to eval for encephalitis vs leptomeningeal disease if above work up remains unrevealing

## 2022-09-16 NOTE — H&P ADULT - NSHPLABSRESULTS_GEN_ALL_CORE
Labs                            9.7    3.12  )-----------( 120      ( 16 Sep 2022 14:09 )             33.5         143  |  109<H>  |  22  ----------------------------<  108<H>  4.7   |  21<L>  |  1.07    Ca    9.0      16 Sep 2022 14:09  Phos  2.9       Mg     1.8         TPro  7.9  /  Alb  4.1  /  TBili  0.7  /  DBili  x   /  AST  46<H>  /  ALT  59<H>  /  AlkPhos  312<H>              Urinalysis Basic - ( 16 Sep 2022 14:09 )    Color: Yellow / Appearance: Clear / S.020 / pH: x  Gluc: x / Ketone: Negative  / Bili: Negative / Urobili: Negative   Blood: x / Protein: 30 mg/dL / Nitrite: Negative   Leuk Esterase: Negative / RBC: 6 /hpf / WBC 2 /HPF   Sq Epi: x / Non Sq Epi: 1 /hpf / Bacteria: Negative      PT/INR - ( 16 Sep 2022 14:09 )   PT: 15.4 sec;   INR: 1.34 ratio      PTT - ( 16 Sep 2022 14:09 )  PTT:31.7 sec      Culture Results:   <10,000 CFU/mL Normal Urogenital Slime ( @ 12:01)  Culture Results:   No Growth Final ( @ 12:00)  Culture Results:   No Growth Final ( @ 16:40)  Culture Results:   No Growth Final ( @ 16:22)      [facility] (2022)  - Chronic encephalomalacia/gliosis in the right parietal-temporal-occipital region, compatible with a chronic infarct in the inferior division of the right middle cerebral artery vascular territory, is stable dating back to 2019.

## 2022-09-16 NOTE — H&P ADULT - ASSESSMENT
72 year old male with afib (on eliquis), HTN,  complete heart block s/p PPM, HLD, HFrEF (30% in 09/2022), and DLBCL (tx with R-CHOP in 2003, with relapse in 2009 treated with BR) now with recently diagnosed grade 3 follicular lymphoma who was transferred from [facility] for AMS likely in the setting of rhino/entero viral pneumonia v.s. CNS involving lymphoma with leptomeningeal disease.

## 2022-09-16 NOTE — CONSULT NOTE ADULT - ASSESSMENT
72M hx DLBCL (tx with R-CHOP in 2003, with relapse in 2009 treated with BR) now with multiple areas of palpable lymphadenopathy with biopsies shown to be at least grade IIIA follicular lymphoma. Recent hospitalization for hemolysis due to G6PD deficiency and rasburicase. Had been started on O-COEP last admission. Now presenting with encephalopathy.    #Follicular Lymphoma  Follows with Dr. Cordova at [facility]. Originally diagnosed in 2003 s/p RCHOP with relapse in 2009 s/p BR. 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 During last admission, had excisional biopsy but was performed after starting treatment.  - Patient is on treatment with a modified regimen of mini-COEP plus Obinutuzumab. C1 Started on 9/1/22 (cycle length q21 days) but full dose obinutuzumab was started 9/2/22.  - s/p excisional biopsy of back lesion on 9/7 - results pending  - Eventual plan to treat with the 3rd weekly dose of obinutuzumab of cycle 1 (was due 9/16/22), pending further workup of encephalopathy      Blayne Barnett MD  Hematology/Oncology Fellow PGY-4  Pager: [facility] [phone] / [place] [phone]   After 5pm and on weekends please page on-call fellow  72M hx DLBCL (tx with R-CHOP in 2003, with relapse in 2009 treated with BR) now with multiple areas of palpable lymphadenopathy with biopsies shown to be at least grade IIIA follicular lymphoma. Recent hospitalization for hemolysis due to G6PD deficiency and rasburicase. Had been started on O-COEP last admission. Now presenting with encephalopathy.    #Relapsed Diffuse Large B-Cell Lymphoma  Follows with Dr. Cordova at [facility]. Originally diagnosed in 2003 s/p RCHOP with relapse in 2009 s/p BR. 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 During last admission, had excisional biopsy but was performed after starting treatment.  - s/p excisional biopsy of back lesion on 9/7 showing DLBCL  - Patient is on treatment with a modified regimen of mini-COEP plus Obinutuzumab. C1 Started on 9/1/22 (cycle length q21 days) but full dose obinutuzumab was started 9/2/22.  - Eventual plan to treat with the 3rd weekly dose of obinutuzumab of cycle 1 (was due 9/16/22), pending further workup of encephalopathy      Blayne Barnett MD  Hematology/Oncology Fellow PGY-4  Pager: [facility] [phone] / [place] [phone]   After 5pm and on weekends please page on-call fellow

## 2022-09-16 NOTE — ED PROVIDER NOTE - ATTENDING APP SHARED VISIT CONTRIBUTION OF CARE
I have personally seen and examined this patient.  I have fully participated in the care of this patient. I performed a substantive portion of the visit including all aspects of the medical decision making. I have reviewed all pertinent clinical information, including history, physical exam, plan and the ACP’s note and agree except as noted. - MD Stan.    see my MDM

## 2022-09-16 NOTE — H&P ADULT - PROBLEM SELECTOR PLAN 4
- History of afib w/ CHB s/p Micra pacemaker then s/p MDT CRT-P upgrade 9/30/19  - v paced on EKG, HR 74, Qtc 499     Plan:  - Continue metoprolol succinate 100 mg PO QD  - Eliquis 5 mg BID

## 2022-09-16 NOTE — H&P ADULT - PROBLEM SELECTOR PLAN 3
- Appears overall euvolemic on exam   - Pro-BNP 37452 <- 10737 (from last admission), troponin 41   - HFrEF with TTE from 09/2022 with EF 30%, severe global left ventricular systolic dysfunction. Mild RV enlargement with decreased RV systolic function    Plan:   - Continue home medications with hold parameters   - Metoprolol succinate 100 mg PO QD  - Entresto 49/51 PO BID  - spironolactone 25 mg PO QD - Appears overall euvolemic on exam   - Pro-BNP 96500 <- 23938 (from last admission), troponin 41   - HFrEF with TTE from 09/2022 with EF 30%, severe global left ventricular systolic dysfunction. Mild RV enlargement with decreased RV systolic function    Plan:   - Continue home medications with hold parameters   - Metoprolol succinate 100 mg PO QD  - Entresto 49/51 PO BID with hold parameters   - spironolactone 25 mg PO QD

## 2022-09-16 NOTE — ED ADULT NURSE NOTE - NS ED NOTE ABUSE RESPONSE YN
OF A VIABLE MALE INFANT OVER AN INTACT PERINEUM IN OA POSITION. +NUCHAL CORD, DELIVERED THROUGH. NO SHOULDER DISTOCIA. NO LACERATIONS. PLACENTA DEL SPONT, INTACT WITH 3VC. EBL 200ml.  CYTOTEC 600mcg GIVEN RECTAL DUE TO LOW HB. Unable to assess due to medical condition

## 2022-09-16 NOTE — H&P ADULT - PROBLEM SELECTOR PLAN 5
DIET: Dash/TLC  DVT Prophylaxis: Eliquis 5 mg BID  Dispo: Pending DIET: Dash/TLC  DVT Prophylaxis: Eliquis 5 mg BID  Dispo: Pending    Discussed with daughter, Vonnie regarding plan of care.

## 2022-09-16 NOTE — ED PROVIDER NOTE - PROGRESS NOTE DETAILS
Spoke to hemo and recommended hospitalist adm. Hematology team at bedside. Spoke to hemat and recommended hospitalist adm. Hematology recommended CXR, labs, B-CT and adm for AMS and Dehydration. Pt was noticed significant AMS in [facility] today and adm for further evaluation.

## 2022-09-16 NOTE — H&P ADULT - PROBLEM SELECTOR PLAN 2
- History of DLBCL (tx with R-CHOP in 2003, with relapse in 2009 treated with BR) now with recently diagnosed grade 3 follicular lymphoma on treatment with a modified regimen of mini-COEP plus Obinutuzumab  - Hematology consulted. Appreciate recs   - C1 Started on 9/1/22 (cycle length q21 days) but full dose obinutuzumab was started 9/2/22. Second dose on 9/9/22  - He was due for his 3rd weekly dose of obinutuzumab of cycle 1 (9/16/22) but now on hold, pending further workup of encephalopathy

## 2022-09-16 NOTE — CONSULT NOTE ADULT - SUBJECTIVE AND OBJECTIVE BOX
Hematology Consult Note    HPI:  Follows with Dr. Cordova at [facility].    73 yo male with PMHx significant for follicular lymphoma with DLBCL (tx with R-CHOP in 2003, with relapse in 2009, treated with BR) now with multiple areas of palpable lymphadenopathy at this point shown to be at least follicular lymphoma grade IIIA in the setting of a steady decline in weight > 20 lbs in the last 6 months without other constitutional complaints. 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     He underwent PET-CT in Aug 2022 which showed FDG avid lymph nodes in the left cervical, bilateral supraclavicular regions, and chest, and a few FDG avid abdominal lymph nodes, intramuscular masses in the left posterior chest and left upper thigh, scattered FDG avid subcutaneous soft tissue/nodules with trace right pleural effusion, moderate left pleural effusion, loculated fluid in the right middle lobe. Moderate abdominal and pelvic ascites. Subcutaneous edema in the lower abdominal wall extending into the imaged bilateral thighs. Splenomegaly with mild FDG avidity, suspicious for lymphomatous involvement.    Prior to the PET-CT, he underwent biopsy of both a cervical lymph node and a left axilla lymph node. 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 There were areas of > 20 SUV on the PET-CT, repeat whole lymph node biopsy was requested to confirm suspected diagnosis of Grade 3B FL or transformed large cell lymphoma.    Recently hospitalized here at [facility] from 8/26/22 to 9/12/22 for concerns for TLS. Prior to being sent to the ED his labs were suggestive of TLS with uric acid 13.7 s/p 3mg rasburicase on 8/23. Started treatment with modified obinutuzumab + COEP regimen. Patient was discharged home and was planned for the 3rd dose of weekly obinutuzumab for cycle 1. After the first cycle of O-COEP, the obinutuzumab is only given on day 1 with the COEP. Had excisional biopsy last admission, but was after starting treatment. Patient was noted to be very confused and was sent to the ED. Patient evaluated in the ER with patient's primary hematologist Dr. Cordova; he is alert but only oriented to person and unable to give reliable history.      REVIEW OF SYSTEMS:  Unable to obtain reliable ROS due to patient's current mental status.     PAST MEDICAL & SURGICAL HISTORY:  Non-Hodgkins Lymphoma  In remission for &gt; 10 years      DM type 2 (diabetes mellitus, type 2)  Never on insulin  Essential hypertension  Rhinitis, allergic  Systolic heart failure  Moderate mitral regurgitation  Pleural effusion  Atrial flutter, unspecified type  Impaired memory    Non-Hodgkin lymphoma  h/o axillary dissection    Cardiac pacemaker      FAMILY HISTORY:  Family history of CHF (congestive heart failure)  Mother    Family history of non-Hodgkin&#x27;s lymphoma (Sibling)        SOCIAL HISTORY:     Allergies  rasburicase (Other)      MEDICATIONS  (STANDING):    MEDICATIONS  (PRN):      OBJECTIVE   Height (cm): 183.5 (09-16 @ 13:01), 177.5 (09-16 @ 09:03)  Weight (kg): 72.6 (09-16 @ 13:01), 71.7 (09-16 @ 09:03)  BMI (kg/m2): 21.6 (09-16 @ 13:01), 22.8 (09-16 @ 09:03)  BSA (m2): 1.94 (09-16 @ 13:01), 1.89 (09-16 @ 09:03)    T(F): 97.5 (09-16-22 @ 13:01), Max: 98 (09-16-22 @ 09:03)  HR: 82 (09-16-22 @ 13:01)  BP: 136/74 (09-16-22 @ 13:01)  RR: 16 (09-16-22 @ 13:01)  SpO2: 96% (09-16-22 @ 13:01)  Wt(kg): --    PHYSICAL EXAM   GENERAL: NAD  HEAD:  Atraumatic, Normocephalic  EYES: EOMI, PERRLA, conjunctiva and sclera clear  CHEST/LUNG: Diffuse crackles  HEART: Regular rate and rhythm; No murmurs, rubs, or gallops  ABDOMEN: Soft, Nontender, Nondistended; Bowel sounds present  EXTREMITIES:  2+ Peripheral Pulses, No clubbing, cyanosis, or edema  NEUROLOGY: A&O to person only. Otherwise no focal neurologic deficits  SKIN: No rashes or lesions                          9.7    3.12  )-----------( 120      ( 16 Sep 2022 14:09 )             33.5       09-16    143  |  109<H>  |  22  ----------------------------<  108<H>  4.7   |  21<L>  |  1.07    Ca    9.0      16 Sep 2022 14:09  Phos  2.9     09-16  Mg     1.8     09-16    TPro  7.9  /  Alb  4.1  /  TBili  0.7  /  DBili  x   /  AST  46<H>  /  ALT  59<H>  /  AlkPhos  312<H>  09-16      Magnesium, Serum: 1.8 mg/dL (09-16 @ 14:09)  Phosphorus Level, Serum: 2.9 mg/dL (09-16 @ 14:09) Hematology Consult Note    HPI:  Follows with Dr. Cordova at [facility].    71 yo male with PMHx significant for follicular lymphoma with DLBCL (tx with R-CHOP in 2003, with relapse in 2009, treated with BR) now with multiple areas of palpable lymphadenopathy at this point shown to be at least follicular lymphoma grade IIIA in the setting of a steady decline in weight > 20 lbs in the last 6 months without other constitutional complaints. 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     He underwent PET-CT in Aug 2022 which showed FDG avid lymph nodes in the left cervical, bilateral supraclavicular regions, and chest, and a few FDG avid abdominal lymph nodes, intramuscular masses in the left posterior chest and left upper thigh, scattered FDG avid subcutaneous soft tissue/nodules with trace right pleural effusion, moderate left pleural effusion, loculated fluid in the right middle lobe. Moderate abdominal and pelvic ascites. Subcutaneous edema in the lower abdominal wall extending into the imaged bilateral thighs. Splenomegaly with mild FDG avidity, suspicious for lymphomatous involvement.    Prior to the PET-CT, he underwent biopsy of both a cervical lymph node and a left axilla lymph node. 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 There were areas of > 20 SUV on the PET-CT, repeat whole lymph node biopsy was requested to confirm suspected diagnosis of Grade 3B FL or transformed large cell lymphoma.    Recently hospitalized here at [facility] from 8/26/22 to 9/12/22 for concerns for TLS. Prior to being sent to the ED his labs were suggestive of TLS with uric acid 13.7 s/p 3mg rasburicase on 8/23. Started treatment with modified obinutuzumab + COEP regimen. Patient was discharged home and was planned for the 3rd dose of weekly obinutuzumab for cycle 1. After the first cycle of O-COEP, the obinutuzumab is only given on day 1 with the COEP. Had excisional biopsy last admission, but was after starting treatment. Patient was noted to be very confused and was sent to the ED. Patient evaluated in the ER with patient's primary hematologist Dr. Cordova; he is alert but only oriented to person and unable to give reliable history.      REVIEW OF SYSTEMS:  Unable to obtain reliable ROS due to patient's current mental status.     PAST MEDICAL & SURGICAL HISTORY:  Non-Hodgkins Lymphoma  In remission for &gt; 10 years      DM type 2 (diabetes mellitus, type 2)  Never on insulin  Essential hypertension  Rhinitis, allergic  Systolic heart failure  Moderate mitral regurgitation  Pleural effusion  Atrial flutter, unspecified type  Impaired memory    Non-Hodgkin lymphoma  h/o axillary dissection    Cardiac pacemaker      FAMILY HISTORY:  Family history of CHF (congestive heart failure)  Mother    Family history of non-Hodgkin&#x27;s lymphoma (Sibling)        SOCIAL HISTORY:     Allergies  rasburicase (Other)      MEDICATIONS  (STANDING):    MEDICATIONS  (PRN):      OBJECTIVE   Height (cm): 183.5 (09-16 @ 13:01), 177.5 (09-16 @ 09:03)  Weight (kg): 72.6 (09-16 @ 13:01), 71.7 (09-16 @ 09:03)  BMI (kg/m2): 21.6 (09-16 @ 13:01), 22.8 (09-16 @ 09:03)  BSA (m2): 1.94 (09-16 @ 13:01), 1.89 (09-16 @ 09:03)    T(F): 97.5 (09-16-22 @ 13:01), Max: 98 (09-16-22 @ 09:03)  HR: 82 (09-16-22 @ 13:01)  BP: 136/74 (09-16-22 @ 13:01)  RR: 16 (09-16-22 @ 13:01)  SpO2: 96% (09-16-22 @ 13:01)  Wt(kg): --    PHYSICAL EXAM   GENERAL: NAD  HEAD:  Atraumatic, Normocephalic  EYES: EOMI, PERRLA, conjunctiva and sclera clear  CHEST/LUNG: Diffuse crackles  HEART: Regular rate and rhythm; No murmurs, rubs, or gallops  ABDOMEN: Soft, Nontender, Nondistended; Bowel sounds present  EXTREMITIES:  2+ Peripheral Pulses, No clubbing, cyanosis, or edema  NEUROLOGY: A&O to person only. Otherwise no focal neurologic deficits  SKIN: No rashes or lesions                          9.7    3.12  )-----------( 120      ( 16 Sep 2022 14:09 )             33.5       09-16    143  |  109<H>  |  22  ----------------------------<  108<H>  4.7   |  21<L>  |  1.07    Ca    9.0      16 Sep 2022 14:09  Phos  2.9     09-16  Mg     1.8     09-16    TPro  7.9  /  Alb  4.1  /  TBili  0.7  /  DBili  x   /  AST  46<H>  /  ALT  59<H>  /  AlkPhos  312<H>  09-16      Magnesium, Serum: 1.8 mg/dL (09-16 @ 14:09)  Phosphorus Level, Serum: 2.9 mg/dL (09-16 @ 14:09)    RADIOLOGY & ADDITIONAL TESTING:  Hematopathology Report:   ACCESSION No:  [id number]  Patient:   NIK GRIMM      Accession:                             [id number]    Collected Date/Time:                   9/7/2022 13:02 EDT  Received Date/Time:                    9/7/2022 13:30 EDT    Hematopathology Report - Modified    Specimen(s) Submitted  1  Back mass    Final Diagnosis  1.  Back mass, excisional biopsy:  _  -   Diffuse large B cell lymphoma , non-germinal center B-cell-like  immunophenotype, relapsed  See note and description      Diagnostic note:   Per chart review, patient has a history of  DLBCL  diagnosed in 2003, treated with  R-CHOP, relapsed in 2009 treated with  BR, now presenting with multiple areas of palpable lymphadenopathy.  Overall the findings are consistent with    diffuse large B cell lymphoma  with non-germinal center B-cell-like immunophenotype. FISH studies  for MYC, BCL2 and BCL6 gene rearrangements were performed on a  recent sample  which showed B cell lymphoma with similar   morphology  and  immunophenotype ( (22-TL-), FISH studies   showed BCL6 (3q27)  breakpoint translocation without   MYC translocation.    Microscopic description:    Histologic sections consist of  portions   of  soft tissue showing diffuse proliferation of large, atypical lymphoid  cells with irregular nuclei, moderately dispersed chromatin, some with  prominent, small multiple nucleoli, some with central single nucleoli.  There is brisk mitosis and apoptosis.    Immunohistochemical stains   for CD3, CD5, CD20, PAX5, CD10, BCL6,  BCL2, MUM1, Ki67, c-MYC, CyclinD1, EBV encoded RNA (ISAURO) in situ  hybridization,  CD21, CD23, CD30, p53 stains performed on formalin fixed  paraffin embedded tissue, block 1A.      Neoplastic cells are:  _  Positive:   CD20, PAX5, BCL6 (subset, weak), BCL2, MUM1, MYC (10%), p53  (some large cells are strongly positive)  Negative:   CD5, CD10, cyclinD1, CD30  Other:  Ki67 proliferation index is 50-60%  CD3 stain highlights small T cells in the background  CD21 and CD23 stains highlight focal, residual follicular dendritic cell  meshwork    Flow cytometry analysis (83-SJ-):     Insufficient cell yield and  low  cell viability to perform cellular immunophenotyping by flow cytometry  _  Cytogenetics Studies  FISH:   Pleaserefer to the recent FISH  studies  for MYC, BCL2 and BCL6  gene  rearrangement (01-JV-)    _    Verified by: Zack Carlos MD  (Electronic Signature)  Reported on: 09/15/22 17:40 EDT, [facility], [address] [place] [address]  Phone: [phone]   Fax: [phone]

## 2022-09-16 NOTE — ED ADULT NURSE NOTE - NSIMPLEMENTINTERV_GEN_ALL_ED
Implemented All Fall Risk Interventions:  [place] to call system. Call bell, personal items and telephone within reach. Instruct patient to call for assistance. Room bathroom lighting operational. Non-slip footwear when patient is off stretcher. Physically safe environment: no spills, clutter or unnecessary equipment. Stretcher in lowest position, wheels locked, appropriate side rails in place. Provide visual cue, wrist band, yellow gown, etc. Monitor gait and stability. Monitor for mental status changes and reorient to person, place, and time. Review medications for side effects contributing to fall risk. Reinforce activity limits and safety measures with patient and family.

## 2022-09-16 NOTE — ED PROVIDER NOTE - QRS
Cyanocobalamin 1mL injection given. Verbal order for injection from Dr. Rosalino MD. Patient aware to return to clinic in April 15, 2019 for next injection. B-12 given in Right Deltoid.  Patient tolerated without incident. See MAR for documentation.       144

## 2022-09-16 NOTE — H&P ADULT - NSHPPHYSICALEXAM_GEN_ALL_CORE
PHYSICAL EXAM:   GENERAL: Alert. Not confused. No acute distress. Not thin. Not cachectic. Not obese.  HEAD:  Atraumatic. Normocephalic.  EYES: EOMI. PERRLA. Normal conjunctiva/sclera.  ENT: Neck supple. No JVD. Moist oral mucosa. Not edentulous. No thrush.  LYMPH: Normal supraclavicular/cervical lymph nodes.   CARDIAC: Not tachy, Not michael. Regular rhythm. Not irregularly irregular. S1. S2. No murmur. No rub. No distant heart sounds.  LUNG/CHEST: CTAB. BS equal bilaterally. No wheezes. No rales. No rhonchi.  ABDOMEN: Soft. No tenderness. No distension. No fluid wave. Normal bowel sounds.  BACK: No midline/vertebral tenderness. No flank tenderness.  VASCULAR: +2 b/l radial or ulnar pulses. Palpable DP pulses.  EXTREMITIES:  No clubbing. No cyanosis. No edema. Moving all 4.  NEUROLOGY: A&Ox3. Non-focal exam. Cranial nerves intact. Normal speech. Sensation intact.  PSYCH: Normal behavior. Normal affect.  SKIN: No jaundice. No erythema. No rash/lesion.  Vascular Access:     ICU Vital Signs Last 24 Hrs  T(C): 36.6 (16 Sep 2022 19:32), Max: 36.7 (16 Sep 2022 09:03)  T(F): 97.8 (16 Sep 2022 19:32), Max: 98 (16 Sep 2022 09:03)  HR: 73 (16 Sep 2022 19:32) (73 - 82)  BP: 135/80 (16 Sep 2022 19:32) (117/78 - 136/74)  RR: 18 (16 Sep 2022 19:32) (16 - 19)  SpO2: 98% (16 Sep 2022 19:32) (95% - 99%)    O2 Parameters below as of 16 Sep 2022 19:32  Patient On (Oxygen Delivery Method): room air GENERAL: Alert.  No acute distress. Appears confused   HEAD:  Atraumatic. Normocephalic.  EYES: EOMI. PERRLA. Normal conjunctiva/sclera.  ENT: No JVD. Moist oral mucosa.    LYMPH: Multiple enlarged cervical, submandibular lymph nodes   CARDIAC: Regular rate and rhythm. Not irregularly irregular. S1. S2. No murmur.    LUNG/CHEST: Good bilateral air entry. Coarse breath sounds on upper lung fields bilaterally. . No wheezes   ABDOMEN: Soft. No tenderness. No distension.  Normal bowel sounds.  BACK: No midline/vertebral tenderness. No flank tenderness.  VASCULAR: +2 b/l radial or ulnar pulses.    EXTREMITIES:  No clubbing. No cyanosis. No edema. Moving all 4.  NEUROLOGY: A&Ox2 (name and place). Non-focal exam. Cranial nerves intact. Normal speech. Sensation intact.  SKIN: No jaundice. No erythema. No rash/lesion.  Vascular Access: Mediport C/D/I without surround erythema or fluctuance     ICU Vital Signs Last 24 Hrs  T(C): 36.6 (16 Sep 2022 19:32), Max: 36.7 (16 Sep 2022 09:03)  T(F): 97.8 (16 Sep 2022 19:32), Max: 98 (16 Sep 2022 09:03)  HR: 73 (16 Sep 2022 19:32) (73 - 82)  BP: 135/80 (16 Sep 2022 19:32) (117/78 - 136/74)  RR: 18 (16 Sep 2022 19:32) (16 - 19)  SpO2: 98% (16 Sep 2022 19:32) (95% - 99%)    O2 Parameters below as of 16 Sep 2022 19:32  Patient On (Oxygen Delivery Method): room air

## 2022-09-16 NOTE — ED PROVIDER NOTE - NS ED ATTENDING STATEMENT MOD
This was a shared visit with the JULI. I reviewed and verified the documentation and independently performed the documented:

## 2022-09-16 NOTE — ED ADULT NURSE NOTE - OBJECTIVE STATEMENT
72 year old male presents to ED via EMS from [facility] complaining of dehydration. Per EMS patient was dropped at [place] for infusion of obinutuzumab for non-hodgkin's lymphoma.  treatment. EMS states they were unable to get IV access on patient and called for transport to emergency room. Upon assessment patient is A&O x3, oriented to self, place, disoriented to year and situation. Patient ambulatory with one assistance and well appearing. Breathing non labored and non tachypneic. Patient has no complaints at this time.  Bed locked and lowered. Comfort and safety measures maintained.

## 2022-09-16 NOTE — PATIENT PROFILE ADULT - FUNCTIONAL ASSESSMENT - BASIC MOBILITY 6.
4-calculated by average/Not able to assess (calculate score using [facility] averaging method)

## 2022-09-16 NOTE — H&P ADULT - NSICDXPASTMEDICALHX_GEN_ALL_CORE_FT
PAST MEDICAL HISTORY:  Atrial flutter, unspecified type     DM type 2 (diabetes mellitus, type 2) Never on insulin    Essential hypertension     Impaired memory     Moderate mitral regurgitation     Non-Hodgkins Lymphoma In [facility] for > 10 years now with relapse    Pleural effusion     Rhinitis, allergic     Systolic heart failure

## 2022-09-17 DIAGNOSIS — R79.89 OTHER SPECIFIED ABNORMAL FINDINGS OF BLOOD CHEMISTRY: ICD-10-CM

## 2022-09-17 DIAGNOSIS — R74.8 ABNORMAL LEVELS OF OTHER SERUM ENZYMES: ICD-10-CM

## 2022-09-17 LAB
ALBUMIN SERPL ELPH-MCNC: 3.7 G/DL — SIGNIFICANT CHANGE UP (ref 3.3–5)
ALP SERPL-CCNC: 240 U/L — HIGH (ref 40–120)
ALT FLD-CCNC: 41 U/L — SIGNIFICANT CHANGE UP (ref 10–45)
AMMONIA BLD-MCNC: 20 UMOL/L — SIGNIFICANT CHANGE UP (ref 11–55)
ANION GAP SERPL CALC-SCNC: 10 MMOL/L — SIGNIFICANT CHANGE UP (ref 5–17)
APTT BLD: 35.2 SEC — SIGNIFICANT CHANGE UP (ref 27.5–35.5)
AST SERPL-CCNC: 26 U/L — SIGNIFICANT CHANGE UP (ref 10–40)
B2 MICROGLOB SERPL-MCNC: 5 MG/L — HIGH (ref 0.8–2.2)
BILIRUB SERPL-MCNC: 0.7 MG/DL — SIGNIFICANT CHANGE UP (ref 0.2–1.2)
BUN SERPL-MCNC: 17 MG/DL — SIGNIFICANT CHANGE UP (ref 7–23)
CALCIUM SERPL-MCNC: 8.4 MG/DL — SIGNIFICANT CHANGE UP (ref 8.4–10.5)
CHLORIDE SERPL-SCNC: 107 MMOL/L — SIGNIFICANT CHANGE UP (ref 96–108)
CO2 SERPL-SCNC: 25 MMOL/L — SIGNIFICANT CHANGE UP (ref 22–31)
CREAT SERPL-MCNC: 1.03 MG/DL — SIGNIFICANT CHANGE UP (ref 0.5–1.3)
D DIMER BLD IA.RAPID-MCNC: 280 NG/ML DDU — HIGH
EGFR: 78 ML/MIN/1.73M2 — SIGNIFICANT CHANGE UP
GLUCOSE SERPL-MCNC: 84 MG/DL — SIGNIFICANT CHANGE UP (ref 70–99)
HCT VFR BLD CALC: 30.4 % — LOW (ref 39–50)
HGB BLD-MCNC: 8.9 G/DL — LOW (ref 13–17)
INR BLD: 1.48 RATIO — HIGH (ref 0.88–1.16)
LDH SERPL L TO P-CCNC: 190 U/L — SIGNIFICANT CHANGE UP (ref 50–242)
LEGIONELLA AG UR QL: NEGATIVE — SIGNIFICANT CHANGE UP
MAGNESIUM SERPL-MCNC: 1.7 MG/DL — SIGNIFICANT CHANGE UP (ref 1.6–2.6)
MCHC RBC-ENTMCNC: 26 PG — LOW (ref 27–34)
MCHC RBC-ENTMCNC: 29.3 GM/DL — LOW (ref 32–36)
MCV RBC AUTO: 88.9 FL — SIGNIFICANT CHANGE UP (ref 80–100)
MRSA PCR RESULT.: SIGNIFICANT CHANGE UP
NRBC # BLD: 0 /100 WBCS — SIGNIFICANT CHANGE UP (ref 0–0)
PHOSPHATE SERPL-MCNC: 2.4 MG/DL — LOW (ref 2.5–4.5)
PLATELET # BLD AUTO: 106 K/UL — LOW (ref 150–400)
POTASSIUM SERPL-MCNC: 3.6 MMOL/L — SIGNIFICANT CHANGE UP (ref 3.5–5.3)
POTASSIUM SERPL-SCNC: 3.6 MMOL/L — SIGNIFICANT CHANGE UP (ref 3.5–5.3)
PROT SERPL-MCNC: 6.9 G/DL — SIGNIFICANT CHANGE UP (ref 6–8.3)
PROTHROM AB SERPL-ACNC: 17.2 SEC — HIGH (ref 10.5–13.4)
RBC # BLD: 3.42 M/UL — LOW (ref 4.2–5.8)
RBC # FLD: 19.6 % — HIGH (ref 10.3–14.5)
S AUREUS DNA NOSE QL NAA+PROBE: SIGNIFICANT CHANGE UP
SODIUM SERPL-SCNC: 142 MMOL/L — SIGNIFICANT CHANGE UP (ref 135–145)
T PALLIDUM AB TITR SER: NEGATIVE — SIGNIFICANT CHANGE UP
TSH SERPL-MCNC: 1.22 UIU/ML — SIGNIFICANT CHANGE UP (ref 0.27–4.2)
URATE SERPL-MCNC: 6.6 MG/DL — SIGNIFICANT CHANGE UP (ref 3.4–8.8)
VIT B12 SERPL-MCNC: 618 PG/ML — SIGNIFICANT CHANGE UP (ref 232–1245)
WBC # BLD: 2.4 K/UL — LOW (ref 3.8–10.5)
WBC # FLD AUTO: 2.4 K/UL — LOW (ref 3.8–10.5)

## 2022-09-17 PROCEDURE — 71250 CT THORAX DX C-: CPT | Mod: 26

## 2022-09-17 PROCEDURE — 99233 SBSQ HOSP IP/OBS HIGH 50: CPT | Mod: GC

## 2022-09-17 RX ORDER — POTASSIUM PHOSPHATE, MONOBASIC POTASSIUM PHOSPHATE, DIBASIC 236; 224 MG/ML; MG/ML
15 INJECTION, SOLUTION INTRAVENOUS ONCE
Refills: 0 | Status: COMPLETED | OUTPATIENT
Start: 2022-09-17 | End: 2022-09-17

## 2022-09-17 RX ADMIN — Medication 1 TABLET(S): at 11:39

## 2022-09-17 RX ADMIN — SACUBITRIL AND VALSARTAN 1 TABLET(S): 24; 26 TABLET, FILM COATED ORAL at 17:10

## 2022-09-17 RX ADMIN — APIXABAN 5 MILLIGRAM(S): 2.5 TABLET, FILM COATED ORAL at 21:24

## 2022-09-17 RX ADMIN — CHLORHEXIDINE GLUCONATE 1 APPLICATION(S): 213 SOLUTION TOPICAL at 11:38

## 2022-09-17 RX ADMIN — APIXABAN 5 MILLIGRAM(S): 2.5 TABLET, FILM COATED ORAL at 08:30

## 2022-09-17 RX ADMIN — POTASSIUM PHOSPHATE, MONOBASIC POTASSIUM PHOSPHATE, DIBASIC 62.5 MILLIMOLE(S): 236; 224 INJECTION, SOLUTION INTRAVENOUS at 11:37

## 2022-09-17 RX ADMIN — Medication 400 MILLIGRAM(S): at 17:09

## 2022-09-17 RX ADMIN — Medication 100 MILLIGRAM(S): at 05:49

## 2022-09-17 RX ADMIN — ATORVASTATIN CALCIUM 40 MILLIGRAM(S): 80 TABLET, FILM COATED ORAL at 21:24

## 2022-09-17 RX ADMIN — SACUBITRIL AND VALSARTAN 1 TABLET(S): 24; 26 TABLET, FILM COATED ORAL at 05:50

## 2022-09-17 RX ADMIN — Medication 400 MILLIGRAM(S): at 05:52

## 2022-09-17 RX ADMIN — Medication 100 MILLIGRAM(S): at 11:39

## 2022-09-17 RX ADMIN — SPIRONOLACTONE 25 MILLIGRAM(S): 25 TABLET, FILM COATED ORAL at 11:39

## 2022-09-17 NOTE — PROGRESS NOTE ADULT - PROBLEM SELECTOR PLAN 3
- Appears overall euvolemic on exam   - Pro-BNP 09390 <- 24483 (from last admission), troponin 41   - HFrEF with TTE from 09/2022 with EF 30%, severe global left ventricular systolic dysfunction. Mild RV enlargement with decreased RV systolic function    Plan:   - Continue home medications with hold parameters   - Metoprolol succinate 100 mg PO QD  - Entresto 49/51 PO BID with hold parameters   - spironolactone 25 mg PO QD Patient with elevated ALK Phos on admission, but denies and RUQ pain or postprandial pain  - RUQ ultrasound to evaluate for biliary changes   - CTM

## 2022-09-17 NOTE — PROGRESS NOTE ADULT - ATTENDING COMMENTS
Patient is not seen by ME YET       72 year old male with afib (on eliquis), HTN,  complete heart block s/p PPM, HLD, HFrEF (30% in 09/2022), and DLBCL (tx with R-CHOP in 2003, with relapse in 2009 treated with BR) now with recently diagnosed grade 3 follicular lymphoma who was transferred from [facility] for [facility]MS. [place] [facility] [place] in the parking lot confused. Per daughter, in the morning of admisson, patient was somewhat agitated and slightly confused this morning but was reorientable.    In the ED, patient afebrile, HR 82, /74, RR 16 (96% on room air). On my eval, patient oriented to name and place, follows commands, but appears confused, with poor concentration, attention, and memory. Therefore, unable to have a reliable history. He reported cough for the past couple of days which was worse yesterday. He denied fever, chills, chest pain, nausea, vomiting, abdominal pain, diarrhea, urinary symptoms. CTH without acute findings, X-ray with mildly increased right loculated effusion. RVP + for rhino/enterovirus.      ---> cont current supportive treatment for the viral infection , getting a CT Of chest of chest and possible CTA ---> will discuss with onc team and if done , will need to closely monitor renal function.   MOnitor SPO2       Leonie Steele   Hospitalist   [phone] /TEAMS 72 year old male with afib (on eliquis), HTN,  complete heart block s/p PPM, HLD, HFrEF (30% in 09/2022), and DLBCL (tx with R-CHOP in 2003, with relapse in 2009 treated with BR) now with recently diagnosed grade 3 follicular lymphoma who was transferred from [facility] for AMS. He presented for [place] at [facility] [place] [facility] and  was later found wandering in the parking lot confused. Per daughter, in the morning of admisson, patient was somewhat agitated and slightly confused this morning but was reorientable.    In the ED, patient afebrile, HR 82, /74, RR 16 (96% on room air). On my eval, patient oriented to name and place, follows commands, but appears confused, with poor concentration, attention, and memory. Therefore, unable to have a reliable history. He reported cough for the past couple of days which was worse yesterday. He denied fever, chills, chest pain, nausea, vomiting, abdominal pain, diarrhea, urinary symptoms. CTH without acute findings, X-ray with mildly increased right loculated effusion. RVP + for rhino/enterovirus.      ---> cont current supportive treatment for the viral infection , getting a CT Of chest of chest and possible CTA ---> will discuss with onc team and if done , will need to closely monitor renal function.   MOnitor SPO2       Leonie Steele   Hospitalist   [phone] /TEAMS

## 2022-09-17 NOTE — PROGRESS NOTE ADULT - PROBLEM SELECTOR PLAN 5
DIET: Dash/TLC  DVT Prophylaxis: Eliquis 5 mg BID  Dispo: Pending    Discussed with daughter, Vonnie regarding plan of care. - Appears overall euvolemic on exam   - Pro-BNP 09070 <- 39974 (from last admission), troponin 41   - HFrEF with TTE from 09/2022 with EF 30%, severe global left ventricular systolic dysfunction. Mild RV enlargement with decreased RV systolic function    Plan:   - Continue home medications with hold parameters   - Metoprolol succinate 100 mg PO QD  - Entresto 49/51 PO BID with hold parameters   - spironolactone 25 mg PO QD

## 2022-09-17 NOTE — PROGRESS NOTE ADULT - PROBLEM SELECTOR PLAN 1
- At encounter, patient A&Ox2. Daughter notes that patient is A&Ox3 at baseline but had episodes of confusion at times, mostly during last hospitalization.   - Etiology may be underlying dementia, delirium, vs metabolic causes vs neurological vs malignancy  - CTH stable chronic infarct without acute findings  - New cough and RVP positive for rhino/enteroviral pneumonia is c/w viral pneumonia which may be contributory.   - Also consider CNS involvement in lymphoma with leptomeningeal disease  Plan:  - Supportive care of rhino-enteroviral pneumonia  - CXR with loculated effusion minimally increased from 08/23. However, not seen on CXR from 09/2022. Will obtain CT chest to further eval   - Follow up rest of infectious work up: blood cultures, urine legionella and strep   - Check syphilis screen, TSH, B12  - Consider LP to eval for encephalitis vs leptomeningeal disease if above work up remains unrevealing - At encounter, patient A&Ox2. Daughter notes that patient is A&Ox3 at baseline but had episodes of confusion at times, mostly during last hospitalization.   - Etiology may be underlying dementia, delirium, vs metabolic causes vs neurological vs malignancy  - CTH stable chronic infarct without acute findings  - New cough and RVP positive for rhino/enteroviral pneumonia is c/w viral pneumonia which may be contributory.   - Also consider CNS involvement in lymphoma with leptomeningeal disease  Plan:  - Supportive care of rhino-enteroviral pneumonia  - CXR with loculated effusion minimally increased from 08/23. However, not seen on CXR from 09/2022.   - Will obtain CT chest to further eval   - Follow up rest of infectious work up: blood cultures, urine legionella and strep   - Check syphilis screen, TSH, B12  - Consider LP to eval for encephalitis vs leptomeningeal disease if patient fails ot improve or if above work up remains unrevealing

## 2022-09-17 NOTE — PROGRESS NOTE ADULT - PROBLEM SELECTOR PLAN 2
- History of DLBCL (tx with R-CHOP in 2003, with relapse in 2009 treated with BR) now with recently diagnosed grade 3 follicular lymphoma on treatment with a modified regimen of mini-COEP plus Obinutuzumab  - Hematology consulted. Appreciate recs   - C1 Started on 9/1/22 (cycle length q21 days) but full dose obinutuzumab was started 9/2/22. Second dose on 9/9/22  - He was due for his 3rd weekly dose of obinutuzumab of cycle 1 (9/16/22) but now on hold, pending further workup of encephalopathy D dimer elevated to 280, however, patient is non-hypoxic or tachypneic, denies any chest pain, and has no lower extremity pain/erythema   Further, patient is currently on elloquis for therapeutic AC i/s/o afib, and has reason for elevated d dimer in setting of cancer   - Per Onc, no need to pursue CT chest with contrast to evaluate for PE

## 2022-09-17 NOTE — PROGRESS NOTE ADULT - PROBLEM SELECTOR PLAN 4
- History of afib w/ CHB s/p Micra pacemaker then s/p MDT CRT-P upgrade 9/30/19  - v paced on EKG, HR 74, Qtc 499     Plan:  - Continue metoprolol succinate 100 mg PO QD  - Eliquis 5 mg BID - History of DLBCL (tx with R-CHOP in 2003, with relapse in 2009 treated with BR) now with recently diagnosed grade 3 follicular lymphoma on treatment with a modified regimen of mini-COEP plus Obinutuzumab  - Hematology consulted. Appreciate recs   - C1 Started on 9/1/22 (cycle length q21 days) but full dose obinutuzumab was started 9/2/22. Second dose on 9/9/22  - He was due for his 3rd weekly dose of obinutuzumab of cycle 1 (9/16/22) but now on hold, pending further workup of encephalopathy

## 2022-09-17 NOTE — PROGRESS NOTE ADULT - PROBLEM SELECTOR PLAN 7
DIET: Dash/TLC  DVT Prophylaxis: Eliquis 5 mg BID  Dispo: Pending    Discussed with daughter, Vonnie regarding plan of care.

## 2022-09-17 NOTE — PROGRESS NOTE ADULT - ASSESSMENT
72 year old male with afib (on eliquis), HTN,  complete heart block s/p PPM, HLD, HFrEF (30% in 09/2022), and DLBCL (tx with R-CHOP in 2003, with relapse in 2009 treated with BR) now with recently diagnosed grade 3 follicular lymphoma who was transferred from [facility] for AMS likely in the setting of rhino/entero viral pneumonia v.s. CNS involving lymphoma with leptomeningeal disease.

## 2022-09-17 NOTE — PROGRESS NOTE ADULT - SUBJECTIVE AND OBJECTIVE BOX
GRIMM, RICHARD  72y  Male      Patient is a 72y old  Male who presents with a chief complaint of Confusion (16 Sep 2022 19:48)      INTERVAL HPI/OVERNIGHT EVENTS:  Patient admitted with increased confusion prior to receiving chemotherapy treatment at outpatient cancer treatment center.   Cough, denies cp, abdominal pain, n/v, dysuria, freq     FAMILY HISTORY:  Family history of CHF (congestive heart failure)  Mother    Family history of non-Hodgkin&#x27;s lymphoma (Sibling)      T(C): 36.4 (09-17-22 @ 04:55), Max: 36.7 (09-16-22 @ 09:03)  HR: 72 (09-17-22 @ 04:55) (72 - 82)  BP: 123/58 (09-17-22 @ 04:55) (117/78 - 136/74)  RR: 18 (09-17-22 @ 04:55) (16 - 19)  SpO2: 96% (09-17-22 @ 04:55) (95% - 99%)  Wt(kg): --Vital Signs Last 24 Hrs  T(C): 36.4 (17 Sep 2022 04:55), Max: 36.7 (16 Sep 2022 09:03)  T(F): 97.5 (17 Sep 2022 04:55), Max: 98 (16 Sep 2022 09:03)  HR: 72 (17 Sep 2022 04:55) (72 - 82)  BP: 123/58 (17 Sep 2022 04:55) (117/78 - 136/74)  BP(mean): --  RR: 18 (17 Sep 2022 04:55) (16 - 19)  SpO2: 96% (17 Sep 2022 04:55) (95% - 99%)    Parameters below as of 17 Sep 2022 04:55  Patient On (Oxygen Delivery Method): room air      rasburicase (Other)      PHYSICAL EXAM:  GENERAL: Alert.  No acute distress. Appears confused   HEAD:  Atraumatic. Normocephalic.  EYES: EOMI. PERRLA. Normal conjunctiva/sclera.  ENT: No JVD. Moist oral mucosa.    LYMPH: Multiple enlarged cervical, submandibular lymph nodes   CARDIAC: Regular rate and rhythm. Not irregularly irregular. S1. S2. No murmur.    LUNG/CHEST: Good bilateral air entry. Coarse breath sounds on upper lung fields bilaterally. . No wheezes   ABDOMEN: Soft. No tenderness. No distension.  Normal bowel sounds.  BACK: No midline/vertebral tenderness. No flank tenderness.  VASCULAR: +2 b/l radial or ulnar pulses.    EXTREMITIES:  No clubbing. No cyanosis. No edema. Moving all 4.  NEUROLOGY: A&Ox2 (name and place). Non-focal exam. Cranial nerves intact. Normal speech. Sensation intact.  SKIN: No jaundice. No erythema. No rash/lesion.  Vascular Access: Mediport C/D/I without surround erythema or fluctuance     Consultant(s) Notes Reviewed:  [x ] YES  [ ] NO  Care Discussed with Consultants/Other Providers [ x] YES  [ ] NO    LABS:      RADIOLOGY & ADDITIONAL TESTS:    Imaging Personally Reviewed:  [ ] YES  [ ] NO  acyclovir   Oral Tab/Cap 400 milliGRAM(s) Oral two times a day  allopurinol 100 milliGRAM(s) Oral daily  apixaban 5 milliGRAM(s) Oral every 12 hours  atorvastatin 40 milliGRAM(s) Oral at bedtime  chlorhexidine 2% Cloths 1 Application(s) Topical daily  chlorhexidine 4% Liquid 1 Application(s) Topical daily  metoprolol succinate  milliGRAM(s) Oral daily  multivitamin 1 Tablet(s) Oral daily  sacubitril 49 mG/valsartan 51 mG 1 Tablet(s) Oral two times a day  senna 2 Tablet(s) Oral at bedtime PRN  spironolactone 25 milliGRAM(s) Oral <User Schedule>      HEALTH ISSUES - PROBLEM Dx:  Prophylactic measure    DM type 2 (diabetes mellitus, type 2)  Never on insulin    Altered mental status    Chronic systolic congestive heart failure    Atrial fibrillation    Follicular lymphoma    Loculated pleural effusion             GRIMM, RICHARD  72y  Male      Patient is a 72y old  Male who presents with a chief complaint of Confusion (16 Sep 2022 19:48)      INTERVAL HPI/OVERNIGHT EVENTS:  Patient admitted with increased confusion prior to receiving chemotherapy treatment at outpatient cancer treatment center.   Endorses a cough, denies cp, abdominal pain, n/v, dysuria, freq     FAMILY HISTORY:  Family history of CHF (congestive heart failure)  Mother    Family history of non-Hodgkin&#x27;s lymphoma (Sibling)      T(C): 36.4 (09-17-22 @ 04:55), Max: 36.7 (09-16-22 @ 09:03)  HR: 72 (09-17-22 @ 04:55) (72 - 82)  BP: 123/58 (09-17-22 @ 04:55) (117/78 - 136/74)  RR: 18 (09-17-22 @ 04:55) (16 - 19)  SpO2: 96% (09-17-22 @ 04:55) (95% - 99%)  Wt(kg): --Vital Signs Last 24 Hrs  T(C): 36.4 (17 Sep 2022 04:55), Max: 36.7 (16 Sep 2022 09:03)  T(F): 97.5 (17 Sep 2022 04:55), Max: 98 (16 Sep 2022 09:03)  HR: 72 (17 Sep 2022 04:55) (72 - 82)  BP: 123/58 (17 Sep 2022 04:55) (117/78 - 136/74)  BP(mean): --  RR: 18 (17 Sep 2022 04:55) (16 - 19)  SpO2: 96% (17 Sep 2022 04:55) (95% - 99%)    Parameters below as of 17 Sep 2022 04:55  Patient On (Oxygen Delivery Method): room air      rasburicase (Other)      PHYSICAL EXAM:  GENERAL: Alert.  No acute distress. Appears confused   HEAD:  Atraumatic. Normocephalic.  EYES: EOMI. PERRLA. Normal conjunctiva/sclera.  ENT: No JVD. Moist oral mucosa.    LYMPH: Multiple enlarged cervical, submandibular lymph nodes   CARDIAC: Regular rate and rhythm. Not irregularly irregular. S1. S2. No murmur.    LUNG/CHEST: Good bilateral air entry. Coarse breath sounds on upper lung fields bilaterally. . No wheezes   ABDOMEN: Soft. No tenderness. No distension.  Normal bowel sounds.  BACK: No midline/vertebral tenderness. No flank tenderness.  VASCULAR: +2 b/l radial or ulnar pulses.    EXTREMITIES:  No clubbing. No cyanosis. No edema. Moving all 4.  NEUROLOGY: A&Ox2 (name and place). Non-focal exam. Cranial nerves intact. Normal speech. Sensation intact.  SKIN: No jaundice. No erythema. No rash/lesion.  Vascular Access: Mediport C/D/I without surround erythema or fluctuance       Consultant(s) Notes Reviewed:  [x ] YES  [ ] NO  Care Discussed with Consultants/Other Providers [ x] YES  [ ] NO    LABS:                          8.9    2.40  )-----------( 106      ( 17 Sep 2022 09:25 )             30.4       09-17    142  |  107  |  17  ----------------------------<  84  3.6   |  25  |  1.03    Ca    8.4      17 Sep 2022 09:25  Phos  2.4     09-17  Mg     1.7     09-17    TPro  6.9  /  Alb  3.7  /  TBili  0.7  /  DBili  x   /  AST  26  /  ALT  41  /  AlkPhos  240<H>  09-17          PT/INR - ( 17 Sep 2022 09:25 )   PT: 17.2 sec;   INR: 1.48 ratio         PTT - ( 17 Sep 2022 09:25 )  PTT:35.2 sec          RADIOLOGY & ADDITIONAL TESTS:    Imaging Personally Reviewed:  [ ] YES  [ ] NO  acyclovir   Oral Tab/Cap 400 milliGRAM(s) Oral two times a day  allopurinol 100 milliGRAM(s) Oral daily  apixaban 5 milliGRAM(s) Oral every 12 hours  atorvastatin 40 milliGRAM(s) Oral at bedtime  chlorhexidine 2% Cloths 1 Application(s) Topical daily  chlorhexidine 4% Liquid 1 Application(s) Topical daily  metoprolol succinate  milliGRAM(s) Oral daily  multivitamin 1 Tablet(s) Oral daily  sacubitril 49 mG/valsartan 51 mG 1 Tablet(s) Oral two times a day  senna 2 Tablet(s) Oral at bedtime PRN  spironolactone 25 milliGRAM(s) Oral <User Schedule>      HEALTH ISSUES - PROBLEM Dx:  Prophylactic measure    DM type 2 (diabetes mellitus, type 2)  Never on insulin    Altered mental status    Chronic systolic congestive heart failure    Atrial fibrillation    Follicular lymphoma    Loculated pleural effusion

## 2022-09-18 DIAGNOSIS — I48.20 CHRONIC ATRIAL FIBRILLATION, UNSPECIFIED: ICD-10-CM

## 2022-09-18 LAB
ALBUMIN SERPL ELPH-MCNC: 3.7 G/DL — SIGNIFICANT CHANGE UP (ref 3.3–5)
ALP SERPL-CCNC: 309 U/L — HIGH (ref 40–120)
ALT FLD-CCNC: 43 U/L — SIGNIFICANT CHANGE UP (ref 10–45)
ANION GAP SERPL CALC-SCNC: 11 MMOL/L — SIGNIFICANT CHANGE UP (ref 5–17)
AST SERPL-CCNC: 28 U/L — SIGNIFICANT CHANGE UP (ref 10–40)
BILIRUB SERPL-MCNC: 0.6 MG/DL — SIGNIFICANT CHANGE UP (ref 0.2–1.2)
BUN SERPL-MCNC: 23 MG/DL — SIGNIFICANT CHANGE UP (ref 7–23)
CALCIUM SERPL-MCNC: 8.6 MG/DL — SIGNIFICANT CHANGE UP (ref 8.4–10.5)
CHLORIDE SERPL-SCNC: 109 MMOL/L — HIGH (ref 96–108)
CO2 SERPL-SCNC: 21 MMOL/L — LOW (ref 22–31)
CREAT SERPL-MCNC: 1.09 MG/DL — SIGNIFICANT CHANGE UP (ref 0.5–1.3)
EGFR: 73 ML/MIN/1.73M2 — SIGNIFICANT CHANGE UP
GLUCOSE SERPL-MCNC: 112 MG/DL — HIGH (ref 70–99)
HCT VFR BLD CALC: 32.1 % — LOW (ref 39–50)
HGB BLD-MCNC: 9.6 G/DL — LOW (ref 13–17)
MAGNESIUM SERPL-MCNC: 1.8 MG/DL — SIGNIFICANT CHANGE UP (ref 1.6–2.6)
MCHC RBC-ENTMCNC: 26.4 PG — LOW (ref 27–34)
MCHC RBC-ENTMCNC: 29.9 GM/DL — LOW (ref 32–36)
MCV RBC AUTO: 88.2 FL — SIGNIFICANT CHANGE UP (ref 80–100)
NRBC # BLD: 0 /100 WBCS — SIGNIFICANT CHANGE UP (ref 0–0)
PHOSPHATE SERPL-MCNC: 2.9 MG/DL — SIGNIFICANT CHANGE UP (ref 2.5–4.5)
PLATELET # BLD AUTO: 135 K/UL — LOW (ref 150–400)
POTASSIUM SERPL-MCNC: 4.1 MMOL/L — SIGNIFICANT CHANGE UP (ref 3.5–5.3)
POTASSIUM SERPL-SCNC: 4.1 MMOL/L — SIGNIFICANT CHANGE UP (ref 3.5–5.3)
PROT SERPL-MCNC: 7 G/DL — SIGNIFICANT CHANGE UP (ref 6–8.3)
RBC # BLD: 3.64 M/UL — LOW (ref 4.2–5.8)
RBC # FLD: 19.6 % — HIGH (ref 10.3–14.5)
S PNEUM AG UR QL: NEGATIVE — SIGNIFICANT CHANGE UP
SODIUM SERPL-SCNC: 141 MMOL/L — SIGNIFICANT CHANGE UP (ref 135–145)
WBC # BLD: 3.12 K/UL — LOW (ref 3.8–10.5)
WBC # FLD AUTO: 3.12 K/UL — LOW (ref 3.8–10.5)

## 2022-09-18 PROCEDURE — 99233 SBSQ HOSP IP/OBS HIGH 50: CPT | Mod: GC

## 2022-09-18 RX ORDER — IPRATROPIUM/ALBUTEROL SULFATE 18-103MCG
3 AEROSOL WITH ADAPTER (GRAM) INHALATION EVERY 6 HOURS
Refills: 0 | Status: DISCONTINUED | OUTPATIENT
Start: 2022-09-18 | End: 2022-10-03

## 2022-09-18 RX ADMIN — Medication 400 MILLIGRAM(S): at 18:01

## 2022-09-18 RX ADMIN — SACUBITRIL AND VALSARTAN 1 TABLET(S): 24; 26 TABLET, FILM COATED ORAL at 05:17

## 2022-09-18 RX ADMIN — APIXABAN 5 MILLIGRAM(S): 2.5 TABLET, FILM COATED ORAL at 05:16

## 2022-09-18 RX ADMIN — APIXABAN 5 MILLIGRAM(S): 2.5 TABLET, FILM COATED ORAL at 18:01

## 2022-09-18 RX ADMIN — Medication 100 MILLIGRAM(S): at 05:17

## 2022-09-18 RX ADMIN — SPIRONOLACTONE 25 MILLIGRAM(S): 25 TABLET, FILM COATED ORAL at 12:10

## 2022-09-18 RX ADMIN — Medication 400 MILLIGRAM(S): at 05:16

## 2022-09-18 RX ADMIN — SACUBITRIL AND VALSARTAN 1 TABLET(S): 24; 26 TABLET, FILM COATED ORAL at 18:01

## 2022-09-18 RX ADMIN — Medication 1 TABLET(S): at 12:10

## 2022-09-18 RX ADMIN — Medication 100 MILLIGRAM(S): at 12:10

## 2022-09-18 RX ADMIN — ATORVASTATIN CALCIUM 40 MILLIGRAM(S): 80 TABLET, FILM COATED ORAL at 21:02

## 2022-09-18 NOTE — PROGRESS NOTE ADULT - ASSESSMENT
72 year old male with afib (on eliquis), HTN,  complete heart block s/p PPM, HLD, HFrEF (30% in 09/2022), and DLBCL (tx with R-CHOP in 2003, with relapse in 2009 treated with BR) now with recently diagnosed grade 3 follicular lymphoma who was transferred from [facility] for AMS likely in the setting of rhino/entero viral pneumonia v.s. CNS involving lymphoma with leptomeningeal disease.

## 2022-09-18 NOTE — PROGRESS NOTE ADULT - ATTENDING COMMENTS
72 year old male with Afib (on eliquis), HTN,  complete heart block s/p PPM, HLD, HFrEF (30% in 09/2022), and DLBCL (tx with R-CHOP in 2003, with relapse in 2009 treated) now with recently diagnosed grade 3 follicular lymphoma who was transferred from [facility] for AMS. He presented for treatment at [facility] [place] [facility] and  was later found wandering in the parking lot confused. Per daughter, in the morning of admisson, patient was somewhat agitated and slightly confused in AM and was reoriented.    In the ED, patient afebrile, HR 82, /74, RR 16 (96% on room air). CTH without acute findings, X-ray with mildly increased right loculated effusion. RVP + for rhino/enterovirus.     # Enterovirus pneumonia       CW supportive care  / Antitussive meds and DUoneb as needed       Monitor SPO2       CT of chest with no evidence of consolidations   # Acute on CHronic Encephalopathy      Exacerbated by current viral  infection      Patient remains pleasantly confused and noted that patient was recently DC from the hosp on 9/12 at that hospitalization , he was confused then      so will discuss with Hem/onc about any role for LP or further testing for his current metabolic encephaloathy is this patient with DBCL ( ? any related brain involvement of the malignancy           Leonie Steele   Hospitalist   [phone] /TEAMS

## 2022-09-18 NOTE — PROGRESS NOTE ADULT - SUBJECTIVE AND OBJECTIVE BOX
PROGRESS NOTE:   Authored by OCHOA Rubio PGY1 Pager: [phone]    Patient is a 72y old  Male who presents with a chief complaint of Confusion (17 Sep 2022 07:24)      SUBJECTIVE / OVERNIGHT EVENTS:  No acute events overnight.     MEDICATIONS  (STANDING):  acyclovir   Oral Tab/Cap 400 milliGRAM(s) Oral two times a day  allopurinol 100 milliGRAM(s) Oral daily  apixaban 5 milliGRAM(s) Oral every 12 hours  atorvastatin 40 milliGRAM(s) Oral at bedtime  chlorhexidine 2% Cloths 1 Application(s) Topical daily  chlorhexidine 4% Liquid 1 Application(s) Topical daily  metoprolol succinate  milliGRAM(s) Oral daily  multivitamin 1 Tablet(s) Oral daily  sacubitril 49 mG/valsartan 51 mG 1 Tablet(s) Oral two times a day  spironolactone 25 milliGRAM(s) Oral <User Schedule>    MEDICATIONS  (PRN):  senna 2 Tablet(s) Oral at bedtime PRN Constipation      CAPILLARY BLOOD GLUCOSE        I&O's Summary      Vital Signs Last 24 Hrs  T(C): 36.7 (18 Sep 2022 04:41), Max: 36.8 (17 Sep 2022 13:59)  T(F): 98.1 (18 Sep 2022 04:41), Max: 98.3 (17 Sep 2022 13:59)  HR: 89 (18 Sep 2022 04:41) (71 - 89)  BP: 129/98 (18 Sep 2022 04:41) (119/65 - 133/73)  BP(mean): --  RR: 18 (18 Sep 2022 04:41) (18 - 18)  SpO2: 95% (18 Sep 2022 04:41) (95% - 99%)    Parameters below as of 18 Sep 2022 04:41  Patient On (Oxygen Delivery Method): room air      PHYSICAL EXAM:  GENERAL: Alert.  No acute distress. Appears confused   HEAD:  Atraumatic. Normocephalic.  EYES: EOMI. PERRLA. Normal conjunctiva/sclera.  ENT: No JVD. Moist oral mucosa.    CARDIAC: Regular rate and rhythm. Not irregularly irregular. S1. S2. No murmur.    LUNG/CHEST: Good bilateral air entry  ABDOMEN: Soft. No tenderness. No distension.  Normal bowel sounds.  EXTREMITIES:  No clubbing. No cyanosis. No edema. Moving all 4.  NEUROLOGY: A&Ox2 (name and place)      LABS:                        9.6    3.12  )-----------( 135      ( 18 Sep 2022 06:54 )             32.1     -18    141  |  109<H>  |  23  ----------------------------<  112<H>  4.1   |  21<L>  |  1.09    Ca    8.6      18 Sep 2022 06:52  Phos  2.9       Mg     1.8         TPro  7.0  /  Alb  3.7  /  TBili  0.6  /  DBili  x   /  AST  28  /  ALT  43  /  AlkPhos  309<H>  18    PT/INR - ( 17 Sep 2022 09:25 )   PT: 17.2 sec;   INR: 1.48 ratio         PTT - ( 17 Sep 2022 09:25 )  PTT:35.2 sec      Urinalysis Basic - ( 16 Sep 2022 14:09 )    Color: Yellow / Appearance: Clear / S.020 / pH: x  Gluc: x / Ketone: Negative  / Bili: Negative / Urobili: Negative   Blood: x / Protein: 30 mg/dL / Nitrite: Negative   Leuk Esterase: Negative / RBC: 6 /hpf / WBC 2 /HPF   Sq Epi: x / Non Sq Epi: 1 /hpf / Bacteria: Negative        Culture - Blood (collected 16 Sep 2022 16:45)  Source: .Blood Blood-Venous  Preliminary Report (17 Sep 2022 20:01):    No growth to date.    Culture - Blood (collected 16 Sep 2022 16:25)  Source: .Blood Blood  Preliminary Report (17 Sep 2022 20:01):    No growth to date.        RADIOLOGY & ADDITIONAL TESTS:  Results Reviewed:   Imaging Personally Reviewed:  Electrocardiogram Personally Reviewed:    COORDINATION OF CARE:  Care Discussed with Consultants/Other Providers [Y/N]:  Prior or Outpatient Records Reviewed [Y/N]:

## 2022-09-18 NOTE — PROGRESS NOTE ADULT - PROBLEM SELECTOR PLAN 5
- Appears overall euvolemic on exam   - Pro-BNP 97218 <- [id number] (from last admission), troponin 41   - HFrEF with TTE from 09/2022 with EF 30%, severe global left ventricular systolic dysfunction. Mild RV enlargement with decreased RV systolic function    Plan:   - Continue home medications with hold parameters   - Metoprolol succinate 100 mg PO QD  - Entresto 49/51 PO BID with hold parameters   - spironolactone 25 mg PO QD

## 2022-09-18 NOTE — PROGRESS NOTE ADULT - PROBLEM SELECTOR PLAN 7
DIET: Dash/TLC  DVT Prophylaxis: Eliquis 5 mg BID  Dispo: Pending    Discussed with daughter, Vonnie regarding plan of care. - History of afib w/ CHB s/p Micra pacemaker then s/p MDT CRT-P upgrade 9/30/19  - v paced on EKG, HR 74, Qtc 499     Plan:  - Continue metoprolol succinate 100 mg PO QD  - Eliquis 5 mg BID

## 2022-09-18 NOTE — PROGRESS NOTE ADULT - PROBLEM SELECTOR PLAN 3
Patient with elevated ALK Phos on admission, but denies and RUQ pain or postprandial pain  - RUQ ultrasound to evaluate for biliary changes   - CTM

## 2022-09-18 NOTE — PROGRESS NOTE ADULT - PROBLEM SELECTOR PLAN 2
D dimer elevated to 280, however, patient is non-hypoxic or tachypneic, denies any chest pain, and has no lower extremity pain/erythema   Further, patient is currently on elloquis for therapeutic AC i/s/o afib, and has reason for elevated d dimer in setting of cancer   - Per Onc, no need to pursue CT chest with contrast to evaluate for PE

## 2022-09-18 NOTE — PROGRESS NOTE ADULT - PROBLEM SELECTOR PLAN 6
- History of afib w/ CHB s/p Micra pacemaker then s/p MDT CRT-P upgrade 9/30/19  - v paced on EKG, HR 74, Qtc 499     Plan:  - Continue metoprolol succinate 100 mg PO QD  - Eliquis 5 mg BID DIET: Dash/TLC  DVT Prophylaxis: Eliquis 5 mg BID  Dispo: Pending    Discussed with daughter, Vonnie regarding plan of care.

## 2022-09-18 NOTE — PROGRESS NOTE ADULT - PROBLEM SELECTOR PLAN 1
- At encounter, patient A&Ox2. Daughter notes that patient is A&Ox3 at baseline but had episodes of confusion at times, mostly during last hospitalization.   - Etiology may be underlying dementia, delirium, vs metabolic causes vs neurological vs malignancy  - CTH stable chronic infarct without acute findings  - New cough and RVP positive for rhino/enteroviral pneumonia is c/w viral pneumonia which may be contributory.   - Also consider CNS involvement in lymphoma with leptomeningeal disease  Plan:  - Supportive care of rhino-enteroviral pneumonia  - CXR with loculated effusion minimally increased from 08/23. However, not seen on CXR from 09/2022.   - Will obtain CT chest to further eval   - Follow up rest of infectious work up: blood cultures, urine legionella and strep   - Check syphilis screen, TSH, B12  - Consider LP to eval for encephalitis vs leptomeningeal disease if patient fails ot improve or if above work up remains unrevealing

## 2022-09-19 LAB
ALBUMIN SERPL ELPH-MCNC: 3.7 G/DL — SIGNIFICANT CHANGE UP (ref 3.3–5)
ALP SERPL-CCNC: 328 U/L — HIGH (ref 40–120)
ALT FLD-CCNC: 38 U/L — SIGNIFICANT CHANGE UP (ref 10–45)
ANION GAP SERPL CALC-SCNC: 15 MMOL/L — SIGNIFICANT CHANGE UP (ref 5–17)
AST SERPL-CCNC: 27 U/L — SIGNIFICANT CHANGE UP (ref 10–40)
BASOPHILS # BLD AUTO: 0 K/UL — SIGNIFICANT CHANGE UP (ref 0–0.2)
BASOPHILS NFR BLD AUTO: 0 % — SIGNIFICANT CHANGE UP (ref 0–2)
BILIRUB SERPL-MCNC: 0.7 MG/DL — SIGNIFICANT CHANGE UP (ref 0.2–1.2)
BUN SERPL-MCNC: 23 MG/DL — SIGNIFICANT CHANGE UP (ref 7–23)
CALCIUM SERPL-MCNC: 9 MG/DL — SIGNIFICANT CHANGE UP (ref 8.4–10.5)
CHLORIDE SERPL-SCNC: 109 MMOL/L — HIGH (ref 96–108)
CO2 SERPL-SCNC: 19 MMOL/L — LOW (ref 22–31)
CREAT SERPL-MCNC: 1.12 MG/DL — SIGNIFICANT CHANGE UP (ref 0.5–1.3)
EGFR: 70 ML/MIN/1.73M2 — SIGNIFICANT CHANGE UP
EOSINOPHIL # BLD AUTO: 0.18 K/UL — SIGNIFICANT CHANGE UP (ref 0–0.5)
EOSINOPHIL NFR BLD AUTO: 6 % — SIGNIFICANT CHANGE UP (ref 0–6)
GGT SERPL-CCNC: 169 U/L — HIGH (ref 9–50)
GIANT PLATELETS BLD QL SMEAR: PRESENT — SIGNIFICANT CHANGE UP
GLUCOSE SERPL-MCNC: 88 MG/DL — SIGNIFICANT CHANGE UP (ref 70–99)
HAPTOGLOB SERPL-MCNC: 211 MG/DL — HIGH (ref 34–200)
HCT VFR BLD CALC: 30.9 % — LOW (ref 39–50)
HGB BLD-MCNC: 9.1 G/DL — LOW (ref 13–17)
LYMPHOCYTES # BLD AUTO: 0.1 K/UL — LOW (ref 1–3.3)
LYMPHOCYTES # BLD AUTO: 3.4 % — LOW (ref 13–44)
MAGNESIUM SERPL-MCNC: 1.8 MG/DL — SIGNIFICANT CHANGE UP (ref 1.6–2.6)
MANUAL SMEAR VERIFICATION: SIGNIFICANT CHANGE UP
MCHC RBC-ENTMCNC: 25.4 PG — LOW (ref 27–34)
MCHC RBC-ENTMCNC: 29.4 GM/DL — LOW (ref 32–36)
MCV RBC AUTO: 86.3 FL — SIGNIFICANT CHANGE UP (ref 80–100)
MONOCYTES # BLD AUTO: 0.39 K/UL — SIGNIFICANT CHANGE UP (ref 0–0.9)
MONOCYTES NFR BLD AUTO: 12.8 % — SIGNIFICANT CHANGE UP (ref 2–14)
NEUTROPHILS # BLD AUTO: 2.32 K/UL — SIGNIFICANT CHANGE UP (ref 1.8–7.4)
NEUTROPHILS NFR BLD AUTO: 76.9 % — SIGNIFICANT CHANGE UP (ref 43–77)
PHOSPHATE SERPL-MCNC: 2.5 MG/DL — SIGNIFICANT CHANGE UP (ref 2.5–4.5)
PLAT MORPH BLD: ABNORMAL
PLATELET # BLD AUTO: 145 K/UL — LOW (ref 150–400)
POTASSIUM SERPL-MCNC: 4.2 MMOL/L — SIGNIFICANT CHANGE UP (ref 3.5–5.3)
POTASSIUM SERPL-SCNC: 4.2 MMOL/L — SIGNIFICANT CHANGE UP (ref 3.5–5.3)
PROCALCITONIN SERPL-MCNC: 0.1 NG/ML — SIGNIFICANT CHANGE UP (ref 0.02–0.1)
PROT SERPL-MCNC: 7.1 G/DL — SIGNIFICANT CHANGE UP (ref 6–8.3)
RBC # BLD: 3.58 M/UL — LOW (ref 4.2–5.8)
RBC # FLD: 19.7 % — HIGH (ref 10.3–14.5)
RBC BLD AUTO: SIGNIFICANT CHANGE UP
SODIUM SERPL-SCNC: 143 MMOL/L — SIGNIFICANT CHANGE UP (ref 135–145)
URATE SERPL-MCNC: 6.6 MG/DL — SIGNIFICANT CHANGE UP (ref 3.4–8.8)
VARIANT LYMPHS # BLD: 0.9 % — SIGNIFICANT CHANGE UP (ref 0–6)
WBC # BLD: 3.02 K/UL — LOW (ref 3.8–10.5)
WBC # FLD AUTO: 3.02 K/UL — LOW (ref 3.8–10.5)

## 2022-09-19 PROCEDURE — 99232 SBSQ HOSP IP/OBS MODERATE 35: CPT | Mod: GC

## 2022-09-19 PROCEDURE — 76705 ECHO EXAM OF ABDOMEN: CPT | Mod: 26

## 2022-09-19 PROCEDURE — 93010 ELECTROCARDIOGRAM REPORT: CPT

## 2022-09-19 RX ADMIN — SACUBITRIL AND VALSARTAN 1 TABLET(S): 24; 26 TABLET, FILM COATED ORAL at 06:24

## 2022-09-19 RX ADMIN — Medication 3 MILLILITER(S): at 06:37

## 2022-09-19 RX ADMIN — SPIRONOLACTONE 25 MILLIGRAM(S): 25 TABLET, FILM COATED ORAL at 14:20

## 2022-09-19 RX ADMIN — Medication 1 MILLIGRAM(S): at 01:04

## 2022-09-19 RX ADMIN — Medication 1 TABLET(S): at 14:18

## 2022-09-19 RX ADMIN — Medication 400 MILLIGRAM(S): at 18:11

## 2022-09-19 RX ADMIN — APIXABAN 5 MILLIGRAM(S): 2.5 TABLET, FILM COATED ORAL at 06:22

## 2022-09-19 RX ADMIN — Medication 100 MILLIGRAM(S): at 21:40

## 2022-09-19 RX ADMIN — Medication 1 MILLIGRAM(S): at 00:56

## 2022-09-19 RX ADMIN — Medication 100 MILLIGRAM(S): at 06:23

## 2022-09-19 RX ADMIN — Medication 3 MILLILITER(S): at 14:20

## 2022-09-19 RX ADMIN — APIXABAN 5 MILLIGRAM(S): 2.5 TABLET, FILM COATED ORAL at 18:08

## 2022-09-19 RX ADMIN — Medication 3 MILLILITER(S): at 18:09

## 2022-09-19 RX ADMIN — Medication 400 MILLIGRAM(S): at 06:25

## 2022-09-19 RX ADMIN — ATORVASTATIN CALCIUM 40 MILLIGRAM(S): 80 TABLET, FILM COATED ORAL at 21:40

## 2022-09-19 RX ADMIN — SACUBITRIL AND VALSARTAN 1 TABLET(S): 24; 26 TABLET, FILM COATED ORAL at 18:08

## 2022-09-19 RX ADMIN — Medication 100 MILLIGRAM(S): at 14:19

## 2022-09-19 RX ADMIN — CHLORHEXIDINE GLUCONATE 1 APPLICATION(S): 213 SOLUTION TOPICAL at 14:24

## 2022-09-19 NOTE — CONSULT NOTE ADULT - ATTENDING COMMENTS
73 yo male with PMHx significant for follicular lymphoma with DLBCL (tx with R-CHOP in 2003, with relapse in 2009, treated with BR) now with multiple areas of palpable lymphadenopathy with recent biopsy showing transformation to DLBCL. He is now cycle 1 day 15 since starting O-COEP. He was due for O today at [facility], however he was unable to get IV access after 5 attempts, was later found wandering the parking lot confused trying to walk home. He was sent to [facility] ED for workup of AMS with the differential of infection (?pneumonia given recent new onset cough) or CNS involvement. Will discuss further treatment following initial workup -- CT head, CXR, blood Cx, UA reflex cultures.
HPI as per resident note, personally verified by me. Patient with reported confusion in parking lot of [facility]. Worried that he "lost all his teeth" (he did not). No abnormal movements noted and no focal neurologic deficits. Mental status now improved but still not back to baseline.    Neurologic exam as per resident note with additions as below:  AAO x 1.5 (hospital but not which, no time), speech fluent  CN's II-XII intact  Strength 5/5 all  Sens intact  Downgoing b/l plantar response    A/P:  Encephalopathy  Anemia  DLBCL  Follicular lymphoma  HTN  CKD stage II  Complete heart block s/p PPM  Heart failure  Prior stroke (R parietotemporal)  Entero/rhinovirus infection    - Etiology likely toxic/metabolic in setting of acute medical infection with URI. No new focal neurologic deficits so cerebrovascular event much lower on differential. Need to exclude seizure, especially given nidus of prior R parietotemporal infarct. Also would exclude metastatic brain disease but lower suspicion  - vEEG to evaluate for focal slowing, epileptiform discharges, or seizures  - MRI brain w/ and w/o to evaluate for above  - Labs as per resident note  - Continue to address above medical problems, as you are doing  - Will continue to follow patient with you

## 2022-09-19 NOTE — CONSULT NOTE ADULT - ASSESSMENT
ASSESSMENT  72 year old male with HTN, CKD stage II, complete heart block (PPM in place), HLD, HFrEF (45% in 2019), and DLBCL (tx with R-CHOP in 2003, with relapse in 2009 treated with BR) now with recently diagnosed grade 3 follicular lymphoma on 8/23 who presents with anemia and SOB. Patient previously admitted for TLS and rasburicase-triggered G6PD hemolytic anemia.  Course complicated by confusion for which team acquired CTH which demonstrated acute/subacute R parietal infarction (larger in size when compared with 5/2019), chronic lacunar infarct in R basal ganglia, mild chronic white matter microvascular disease, no hemorrhagic transformation. Patient with multiple prior episodes of confusion and cognitive difficulties. Comes in after being found wondering care home after not being able to get his chemotherapy suspicious for acute on chronic encephalopathy. .     IMPRESSION     Acute on chronic encephalopathy i/s/o DLBCL (was planning 3rd dose of obinutuzumab pending workup of encephalopathy) found to be positive for rhino/enterovirus in current admission. Possible etiologies include autoimmune vs paraneoplastic. Low likelihood of seizures given no previous history but does have a potential nidus in the left parietal infarct as seen by limited MRI     RECOMMENDATION   [ ] Pending autoimmune and paraneoplastic labs   [ ] Ordered for folate   [ ] vEEG  [ ] order for MRI brain w/wo contrast given that patient did not finish the full sequence at previous admission      ASSESSMENT  72 year old male with HTN, CKD stage II, complete heart block (PPM in place), HLD, HFrEF (45% in 2019), and DLBCL (tx with R-CHOP in 2003, with relapse in 2009 treated with BR) now with recently diagnosed grade 3 follicular lymphoma on 8/23 who presents with anemia and SOB. Patient previously admitted for TLS and rasburicase-triggered G6PD hemolytic anemia.  Course complicated by confusion for which team acquired CTH which demonstrated acute/subacute R parietal infarction (larger in size when compared with 5/2019), chronic lacunar infarct in R basal ganglia, mild chronic white matter microvascular disease, no hemorrhagic transformation. Patient with multiple prior episodes of confusion and cognitive difficulties. Comes in after being found wondering assisted after not being able to get his chemotherapy suspicious for acute on chronic encephalopathy. .     IMPRESSION     Acute on chronic encephalopathy i/s/o DLBCL (was planning 3rd dose of obinutuzumab pending workup of encephalopathy) found to be positive for rhino/enterovirus in current admission. Possible etiologies include autoimmune vs paraneoplastic. Low likelihood of seizures given no previous history but does have a potential nidus in the left parietal infarct as seen by limited MRI     RECOMMENDATION   [ ] Pending autoimmune and paraneoplastic labs   [ ] Ordered for folate   [ ] vEEG  [ ] order for MRI brain w/wo contrast given that patient did not finish the full sequence at previous admission     Case to be discussed with Neurology attending Dr. Michael Dobson in AM     ASSESSMENT  72 year old male with HTN, CKD stage II, complete heart block (PPM in place), HLD, HFrEF (45% in 2019), and DLBCL (tx with R-CHOP in 2003, with relapse in 2009 treated with BR) now with recently diagnosed grade 3 follicular lymphoma on 8/23 who presents with anemia and SOB. Patient previously admitted for TLS and rasburicase-triggered G6PD hemolytic anemia.  Course complicated by confusion for which team acquired CTH which demonstrated acute/subacute R parietal infarction (larger in size when compared with 5/2019), chronic lacunar infarct in R basal ganglia, mild chronic white matter microvascular disease, no hemorrhagic transformation. Patient with multiple prior episodes of confusion and cognitive difficulties. Comes in after being found wondering [facility] after not being able to get his chemotherapy suspicious for acute on chronic encephalopathy. .     IMPRESSION     Acute on chronic encephalopathy i/s/o DLBCL (was planning 3rd dose of obinutuzumab pending workup of encephalopathy) found to be positive for rhino/enterovirus in current admission. Possible etiologies include autoimmune vs paraneoplastic. Low likelihood of seizures given no previous history but does have a potential nidus in the right parietal infarct as seen by limited MRI     RECOMMENDATION   [ ] Pending autoimmune and paraneoplastic labs   [ ] Ordered for folate   [ ] vEEG  [ ] order for MRI brain w/wo contrast given that patient did not finish the full sequence at previous admission     Case to be discussed with Neurology attending Dr. Michael Dobson in AM

## 2022-09-19 NOTE — PROVIDER CONTACT NOTE (CHANGE IN STATUS NOTIFICATION) - ACTION/TREATMENT ORDERED:
MD made aware, code gray called, seen and examined by MD, pt medicated with Ativan 1 mg IVP x2 doses, 15 mins apart , as ordered, closely monitored.

## 2022-09-19 NOTE — PROGRESS NOTE ADULT - PROBLEM SELECTOR PLAN 3
Patient with elevated ALK Phos on admission, but denies and RUQ pain or postprandial pain  - RUQ ultrasound to evaluate for biliary changes   - CTM Patient with elevated ALK Phos on admission, but denies and RUQ pain or postprandial pain  - RUQ ultrasound to evaluate for biliary changes: Cholelithiasis with gallbladder sludge. Gallbladder wall thickening with intramural edema.  - CTM for pain

## 2022-09-19 NOTE — PROGRESS NOTE ADULT - ASSESSMENT
72 year old male with afib (on eliquis), HTN,  complete heart block s/p PPM, HLD, HFrEF (30% in 09/2022), and DLBCL (tx with R-CHOP in 2003, with relapse in 2009 treated with BR) now with recently diagnosed grade 3 follicular lymphoma who was transferred from [facility] for AMS likely in the setting of rhino/entero viral pneumonia v.s. CNS involving lymphoma with leptomeningeal disease.

## 2022-09-19 NOTE — PROGRESS NOTE ADULT - SUBJECTIVE AND OBJECTIVE BOX
SIRISHANIK  72y  Male      Patient is a 72y old  Male who presents with a chief complaint of Confusion (18 Sep 2022 08:29)      INTERVAL HPI/OVERNIGHT EVENTS:  Overnight, patient was agitated and ocnfused, required ativan 1mg x 2 following code grey, haldol not administered i/s/o prolonger qtc     T(C): 36.9 (09-19-22 @ 05:11), Max: 36.9 (09-18-22 @ 21:22)  HR: 82 (09-19-22 @ 05:11) (82 - 85)  BP: 143/90 (09-19-22 @ 05:11) (135/80 - 143/90)  RR: 18 (09-19-22 @ 05:11) (18 - 18)  SpO2: 97% (09-19-22 @ 05:11) (97% - 100%)  Wt(kg): --Vital Signs Last 24 Hrs  T(C): 36.9 (19 Sep 2022 05:11), Max: 36.9 (18 Sep 2022 21:22)  T(F): 98.5 (19 Sep 2022 05:11), Max: 98.5 (18 Sep 2022 21:22)  HR: 82 (19 Sep 2022 05:11) (82 - 85)  BP: 143/90 (19 Sep 2022 05:11) (135/80 - 143/90)  BP(mean): --  RR: 18 (19 Sep 2022 05:11) (18 - 18)  SpO2: 97% (19 Sep 2022 05:11) (97% - 100%)    Parameters below as of 19 Sep 2022 05:11  Patient On (Oxygen Delivery Method): room air      rasburicase (Other)      PHYSICAL EXAM:  GENERAL: Alert.  No acute distress. Appears confused   HEAD:  Atraumatic. Normocephalic.  EYES: EOMI. PERRLA. Normal conjunctiva/sclera.  ENT: No JVD. Moist oral mucosa.    CARDIAC: Regular rate and rhythm. Not irregularly irregular. S1. S2. No murmur.    LUNG/CHEST: Good bilateral air entry  ABDOMEN: Soft. No tenderness. No distension.  Normal bowel sounds.  EXTREMITIES:  No clubbing. No cyanosis. No edema. Moving all 4.  NEUROLOGY: A&Ox2 (name and place)      Consultant(s) Notes Reviewed:  [x ] YES  [ ] NO  Care Discussed with Consultants/Other Providers [ x] YES  [ ] NO    LABS:        RADIOLOGY & ADDITIONAL TESTS:    Imaging Personally Reviewed:  [ ] YES  [ ] NO  acyclovir   Oral Tab/Cap 400 milliGRAM(s) Oral two times a day  albuterol/ipratropium for Nebulization 3 milliLiter(s) Nebulizer every 6 hours  allopurinol 100 milliGRAM(s) Oral daily  apixaban 5 milliGRAM(s) Oral every 12 hours  atorvastatin 40 milliGRAM(s) Oral at bedtime  chlorhexidine 2% Cloths 1 Application(s) Topical daily  chlorhexidine 4% Liquid 1 Application(s) Topical daily  guaiFENesin Oral Liquid (Sugar-Free) 100 milliGRAM(s) Oral every 6 hours PRN  metoprolol succinate  milliGRAM(s) Oral daily  multivitamin 1 Tablet(s) Oral daily  sacubitril 49 mG/valsartan 51 mG 1 Tablet(s) Oral two times a day  senna 2 Tablet(s) Oral at bedtime PRN  spironolactone 25 milliGRAM(s) Oral <User Schedule>      HEALTH ISSUES - PROBLEM Dx:  Altered mental status    Positive D dimer    High alkaline phosphatase    Follicular lymphoma    Chronic systolic congestive heart failure    Atrial fibrillation    Prophylactic measure    DM type 2 (diabetes mellitus, type 2)  Never on insulin    Loculated pleural effusion    Chronic atrial fibrillation             GRIMM, RICHARD  72y  Male      Patient is a 72y old  Male who presents with a chief complaint of Confusion (18 Sep 2022 08:29)      INTERVAL HPI/OVERNIGHT EVENTS:  Overnight, patient was agitated and confused, required ativan 1mg x 2 following code grey, haldol not administered i/s/o prolonger qtc     T(C): 36.9 (09-19-22 @ 05:11), Max: 36.9 (09-18-22 @ 21:22)  HR: 82 (09-19-22 @ 05:11) (82 - 85)  BP: 143/90 (09-19-22 @ 05:11) (135/80 - 143/90)  RR: 18 (09-19-22 @ 05:11) (18 - 18)  SpO2: 97% (09-19-22 @ 05:11) (97% - 100%)  Wt(kg): --Vital Signs Last 24 Hrs  T(C): 36.9 (19 Sep 2022 05:11), Max: 36.9 (18 Sep 2022 21:22)  T(F): 98.5 (19 Sep 2022 05:11), Max: 98.5 (18 Sep 2022 21:22)  HR: 82 (19 Sep 2022 05:11) (82 - 85)  BP: 143/90 (19 Sep 2022 05:11) (135/80 - 143/90)  BP(mean): --  RR: 18 (19 Sep 2022 05:11) (18 - 18)  SpO2: 97% (19 Sep 2022 05:11) (97% - 100%)    Parameters below as of 19 Sep 2022 05:11  Patient On (Oxygen Delivery Method): room air      rasburicase (Other)      PHYSICAL EXAM:  GENERAL: Alert.  No acute distress. Appears confused   HEAD:  Atraumatic. Normocephalic.  EYES: EOMI. PERRLA. Normal conjunctiva/sclera.  ENT: No JVD. Moist oral mucosa.    CARDIAC: Regular rate and rhythm. Not irregularly irregular. S1. S2. No murmur.    LUNG/CHEST: Good bilateral air entry  ABDOMEN: Soft. No tenderness. No distension.  Normal bowel sounds.  EXTREMITIES:  No clubbing. No cyanosis. No edema. Moving all 4.  NEUROLOGY: A&Ox2 (name and place)      Consultant(s) Notes Reviewed:  [x ] YES  [ ] NO  Care Discussed with Consultants/Other Providers [ x] YES  [ ] NO    LABS:                          9.1    3.02  )-----------( 145      ( 19 Sep 2022 07:24 )             30.9       09-19    143  |  109<H>  |  23  ----------------------------<  88  4.2   |  19<L>  |  1.12    Ca    9.0      19 Sep 2022 07:26  Phos  2.5     09-19  Mg     1.8     09-19    TPro  7.1  /  Alb  3.7  /  TBili  0.7  /  DBili  x   /  AST  27  /  ALT  38  /  AlkPhos  328<H>  09-19              RADIOLOGY & ADDITIONAL TESTS:    Imaging Personally Reviewed:  [ ] YES  [ ] NO  acyclovir   Oral Tab/Cap 400 milliGRAM(s) Oral two times a day  albuterol/ipratropium for Nebulization 3 milliLiter(s) Nebulizer every 6 hours  allopurinol 100 milliGRAM(s) Oral daily  apixaban 5 milliGRAM(s) Oral every 12 hours  atorvastatin 40 milliGRAM(s) Oral at bedtime  chlorhexidine 2% Cloths 1 Application(s) Topical daily  chlorhexidine 4% Liquid 1 Application(s) Topical daily  guaiFENesin Oral Liquid (Sugar-Free) 100 milliGRAM(s) Oral every 6 hours PRN  metoprolol succinate  milliGRAM(s) Oral daily  multivitamin 1 Tablet(s) Oral daily  sacubitril 49 mG/valsartan 51 mG 1 Tablet(s) Oral two times a day  senna 2 Tablet(s) Oral at bedtime PRN  spironolactone 25 milliGRAM(s) Oral <User Schedule>      HEALTH ISSUES - PROBLEM Dx:  Altered mental status    Positive D dimer    High alkaline phosphatase    Follicular lymphoma    Chronic systolic congestive heart failure    Atrial fibrillation    Prophylactic measure    DM type 2 (diabetes mellitus, type 2)  Never on insulin    Loculated pleural effusion    Chronic atrial fibrillation

## 2022-09-19 NOTE — PROGRESS NOTE ADULT - PROBLEM SELECTOR PLAN 1
- At encounter, patient A&Ox2. Daughter notes that patient is A&Ox3 at baseline but had episodes of confusion at times, mostly during last hospitalization.   - Etiology may be underlying dementia, delirium, vs metabolic causes vs neurological vs malignancy  - CTH stable chronic infarct without acute findings  - New cough and RVP positive for rhino/enteroviral pneumonia is c/w viral pneumonia which may be contributory.   - Also consider CNS involvement in lymphoma with leptomeningeal disease  Plan:  - Supportive care of rhino-enteroviral pneumonia  - CXR with loculated effusion minimally increased from 08/23. However, not seen on CXR from 09/2022.   - Will obtain CT chest to further eval   - Follow up rest of infectious work up: blood cultures, urine legionella and strep   - Check syphilis screen, TSH, B12  - Consider LP to eval for encephalitis vs leptomeningeal disease if patient fails ot improve or if above work up remains unrevealing - At encounter, patient A&Ox2. Daughter notes that patient is A&Ox3 at baseline but had episodes of confusion at times, mostly during last hospitalization.   - Etiology may be underlying dementia, delirium, vs metabolic causes vs neurological vs malignancy  - CTH stable chronic infarct without acute findings  - New cough and RVP positive for rhino/enteroviral pneumonia is c/w viral pneumonia which may be contributory.   - Also consider CNS involvement in lymphoma with leptomeningeal disease  Plan:  - Supportive care of rhino-enteroviral pneumonia  - CXR with loculated effusion minimally increased from 08/23. However, not seen on CXR from 09/2022.   - CT chest with no acute findings   - infectious workup negative    - Check syphilis screen, TSH, B12: all normal   - Consider LP to eval for encephalitis vs leptomeningeal disease if patient fails ot improve or if above work up remains unrevealing

## 2022-09-19 NOTE — CHART NOTE - NSCHARTNOTEFT_GEN_A_CORE
Notified at around 10:30 pm on 9/18 that pt wanted to leave the hospital. Assessed pt, he was seated on his bed, A&Ox1, not appearing agitated. He stated at this time that he wanted to leave the hospital to check on his wife, who he was worried about.   Explained to pt that we would attempt to contact family for him. We subsequently received no response on multiple phone calls to wife & daughter. Provided reassurance to pt, environmental modification, & melatonin, pt decided he would go to sleep.    Notified at around 12:30 am on 9/19 that pt was agitated and again wanting to leave the hospital. At this point a code gray was called. On arrival, pt sitting on a chair in the hallway. Pt making loud & threatening statements toward staff & security, stated that he wanted to speak to the "doctor of the hospital." Administered 1 mg Ativan IV. Pt was returned to his room via wheelchair. He continued to be agitated and refused to move to his bed. Administered additional 1 mg Ativan IV. Avoided Haldol due to prolonged QTc (most recent QTc 499).    Pt subsequently helped into his bed, agitation resolved.     Boogie Gonzalez  TEAMS Notified at around 10:30 pm on 9/18 that pt wanted to leave the hospital. Assessed pt, he was seated on his bed, A&Ox1 & demonstrating poor insight & judgment. He stated at this time that he wanted to leave the hospital to check on his wife, who he was worried about.  Explained to pt that we would attempt to contact family for him. We subsequently received no response on multiple phone calls to wife & daughter. Provided reassurance to pt, environmental modification, & melatonin, pt decided he would go to sleep.    Notified at around 12:30 am on 9/19 that pt was agitated and again wanting to leave the hospital. At this point a code gray was called. On arrival, pt sitting on a chair in the hallway. Pt making loud & threatening statements toward staff & security, stated that he wanted to speak to the "doctor of the hospital." Actively attempting to walk past staff members toward the exit. Pt was restrained by security and administered 1 mg Ativan IV. Pt was returned to his room via wheelchair. He continued to be agitated and refused to move to his bed. Administered additional 1 mg Ativan IV. Avoided Haldol due to prolonged QTc (most recent QTc 499).    Pt subsequently helped into his bed, agitation resolved.     Boogie Gonzalez  TEAMS

## 2022-09-19 NOTE — CONSULT NOTE ADULT - SUBJECTIVE AND OBJECTIVE BOX
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 Currently presented with confusion after being found in the parking lot of [facility]. The patient had no recollection of the incident and much collateral was acquired from notes and his wife.     According to the wife, the patient was driven by their daughter to [facility] in order to receive his infusion. After being unable to establish an IV, the patient left [facility] and was found wandering in the parking lot. He was escorted to the ED.     According to previous note:   In the ED, patient afebrile, HR 82, /74, RR 16 (96% on room air). On my eval, patient oriented to name and place, follows commands, but appears confused, with poor concentration, attention, and memory. Therefore, unable to have a reliable history. He reported cough for the past couple of days which was worse yesterday. He denied fever, chills, chest pain, nausea, vomiting, abdominal pain, diarrhea, urinary symptoms. CTH without acute findings, X-ray with mildly increased right loculated effusion. RVP + for rhino/enterovirus. Given on vanc and zosyn x1.      During the patient's hospital course, he received infectious metabolic panel labs such as procal, B12, folate, TSH, uric acid, ammonia, legionella, which were wnl or negative. GGT and haptoglobin were elevated. RRT called for patient on 9/19 for which the patient complained that he "had lost all his teeth" and wanted to leave the hospital. Was restrained by security and administrated Ativan 1mg x2. Neurology consulted for continued confusion and medication recs.       Review of Systems:    CONSTITUTIONAL: No fevers or chills  EYES AND ENT: No visual changes or no throat pain   RESPIRATORY: No shortness of breath  CARDIOVASCULAR: No chest pain or palpitations  GASTROINTESTINAL: No melena or hematochezia  GENITOURINARY: No dysuria or hematuria  NEUROLOGICAL: +As stated in HPI above  All other review of systems is negative unless indicated above.    Allergies:  rasburicase (Other)      PMHx/PSHx/Family Hx: As above, otherwise see below   Non-Hodgkins Lymphoma    Diabetes Mellitus    DM type 2 (diabetes mellitus, type 2)    Essential hypertension    Rhinitis, allergic    Systolic heart failure    Moderate mitral regurgitation    Pleural effusion    Atrial flutter, unspecified type    Impaired memory        Social Hx:  - He is currently retired. He used to be an .  - Former smoker. He smoked for 10 years less than 1 pack a day. He quit 20 years ago    - He is currently retired. He used to be an .  - Former smoker. He smoked for 10 years less than 1 pack a day. He quit 20 years ago    Family hx  - Two brothers non Hodgkin lymphoma. At least one required chemotherapy. sister had cervical cancer first diagnosed 2007 and then 3 years later with more cancer discovered  - Mother and father passed away from heart disease and diabetes.        Medications:  MEDICATIONS  (STANDING):  acyclovir   Oral Tab/Cap 400 milliGRAM(s) Oral two times a day  albuterol/ipratropium for Nebulization 3 milliLiter(s) Nebulizer every 6 hours  allopurinol 100 milliGRAM(s) Oral daily  apixaban 5 milliGRAM(s) Oral every 12 hours  atorvastatin 40 milliGRAM(s) Oral at bedtime  chlorhexidine 2% Cloths 1 Application(s) Topical daily  chlorhexidine 4% Liquid 1 Application(s) Topical daily  metoprolol succinate  milliGRAM(s) Oral daily  multivitamin 1 Tablet(s) Oral daily  sacubitril 49 mG/valsartan 51 mG 1 Tablet(s) Oral two times a day  spironolactone 25 milliGRAM(s) Oral <User Schedule>    MEDICATIONS  (PRN):  guaiFENesin Oral Liquid (Sugar-Free) 100 milliGRAM(s) Oral every 6 hours PRN Cough  senna 2 Tablet(s) Oral at bedtime PRN Constipation      Vitals:  T(C): 36.5 (09-19-22 @ 14:20), Max: 36.9 (09-18-22 @ 21:22)  HR: 82 (09-19-22 @ 14:20) (82 - 85)  BP: 148/88 (09-19-22 @ 14:20) (135/80 - 148/88)  RR: 17 (09-19-22 @ 14:20) (17 - 18)  SpO2: 98% (09-19-22 @ 14:20) (97% - 100%)    Physical Examination:   General - NAD, pleasant, cooperative, confused that he did not have teeth  Neurologic Exam:  Mental status - Awake, Alert, Oriented to person but not to place and time. Difficulties with serial 7s and spelling WORLD backwards. Speech fluent, repetition and naming intact. Follows simple and complex commands. Fund of knowledge not intact    Cranial nerves:  CN II: Visual fields are full to confrontation. Pupils are 4 mm and briskly reactive to light (4 to 3mm b.l).   CN III, IV, VI: EOMI, no nystagmus, no ptosis  CN V: Facial sensation is intact to pinprick in all 3 divisions bilaterally.  CN VII: Face is symmetric with normal eye closure and smile.  CN VII: Hearing is normal to rubbing fingers  CN IX, X: Palate elevates symmetrically. Phonation is normal.  CN XI: Head turning and shoulder shrug are intact  CN XII: Tongue is midline with normal movements and no atrophy.    Motor - Normal bulk and tone throughout. No pronator drift of out-stretched arms.  Strength testing            Deltoid      Biceps      Triceps     Wrist Extension    Wrist Flexion     Interossei         R            5                 5               5                     5                              5                        5                 5  L             5                 5               5                     5                              5                        5                 5              Hip Flexion    Hip Extension    Knee Flexion    Knee Extension    Dorsiflexion    Plantar Flexion  R              5                           5                       5                           5                            5                          5  L              5                           5                        5                           5                            5                          5    Sensation - Light touch intact in fingers and toes     DTR's -             Biceps      Triceps     Brachioradialis      Patellar    Ankle    Toes/plantar response  R             1+             1+                  1+                       2+            1+                 Mute   L              1+             1+                 1+                        2+           1+                 Down    Coordination - There are no abnormal or extraneous movements.     Gait and station - Posture is normal. Gait is steady with short steps, base, arm swing, and turning. No romberg.   Labs:                        9.1    3.02  )-----------( 145      ( 19 Sep 2022 07:24 )             30.9     09-19    143  |  109<H>  |  23  ----------------------------<  88  4.2   |  19<L>  |  1.12    Ca    9.0      19 Sep 2022 07:26  Phos  2.5     09-19  Mg     1.8     09-19    TPro  7.1  /  Alb  3.7  /  TBili  0.7  /  DBili  x   /  AST  27  /  ALT  38  /  AlkPhos  328<H>  09-19    CAPILLARY BLOOD GLUCOSE        LIVER FUNCTIONS - ( 19 Sep 2022 07:26 )  Alb: 3.7 g/dL / Pro: 7.1 g/dL / ALK PHOS: 328 U/L / ALT: 38 U/L / AST: 27 U/L / GGT: 169 U/L         Culture - Blood (collected 16 Sep 2022 16:45)  Source: .Blood Blood-Venous  Preliminary Report (17 Sep 2022 20:01):    No growth to date.    Culture - Blood (collected 16 Sep 2022 16:25)  Source: .Blood Blood  Preliminary Report (17 Sep 2022 20:01):    No growth to date.        CSF:                  Radiology:  CT (09/16/2022)  - Chronic encephalomalacia/gliosis in the right parietal-temporal-occipital region, compatible with a chronic infarct in the inferior division of the right middle cerebral artery vascular territory, is stable dating back to 05/07/2019.       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 Currently presented with confusion after being found in the parking lot of [facility]. The patient had no recollection of the incident and much collateral was acquired from notes and his wife.     According to the wife, the patient was [place] by [place] [facility] [place]. After being unable to establish an IV, the [place] [facility] and was found wandering in the parking lot. He was escorted to the ED.     According to previous note:   In the ED, patient afebrile, HR 82, /74, RR 16 (96% on room air). On my eval, patient oriented to name and place, follows commands, but appears confused, with poor concentration, attention, and memory. Therefore, unable to have a reliable history. He reported cough for the past couple of days which was worse yesterday. He denied fever, chills, chest pain, nausea, vomiting, abdominal pain, diarrhea, urinary symptoms. CTH without acute findings, X-ray with mildly increased right loculated effusion. RVP + for rhino/enterovirus. Given on vanc and zosyn x1.      During the patient's hospital course, he received infectious metabolic panel labs such as procal, B12, folate, TSH, uric acid, ammonia, legionella, which were wnl or negative. GGT and haptoglobin were elevated. RRT called for patient on 9/19 for which the patient complained that he "had lost all his teeth" and wanted to leave the hospital. Was restrained by security and administrated Ativan 1mg x2. Neurology consulted for continued confusion and medication recs.       Review of Systems:    CONSTITUTIONAL: No fevers or chills  EYES AND ENT: No visual changes or no throat pain   RESPIRATORY: No shortness of breath  CARDIOVASCULAR: No chest pain or palpitations  GASTROINTESTINAL: No melena or hematochezia  GENITOURINARY: No dysuria or hematuria  NEUROLOGICAL: +As stated in HPI above  All other review of systems is negative unless indicated above.    Allergies:  rasburicase (Other)      PMHx/PSHx/Family Hx: As above, otherwise see below   Non-Hodgkins Lymphoma    Diabetes Mellitus    DM type 2 (diabetes mellitus, type 2)    Essential hypertension    Rhinitis, allergic    Systolic heart failure    Moderate mitral regurgitation    Pleural effusion    Atrial flutter, unspecified type    Impaired memory        Social Hx:  - He is currently retired. He used to be an .  - Former smoker. He smoked for 10 years less than 1 pack a day. He quit 20 years ago    - He is currently retired. He used to be an .  - Former smoker. He smoked for 10 years less than 1 pack a day. He quit 20 years ago    Family hx  - Two brothers non Hodgkin lymphoma. At least one required chemotherapy. sister had cervical cancer first diagnosed 2007 and then 3 years later with more cancer discovered  - Mother and father passed away from heart disease and diabetes.        Medications:  MEDICATIONS  (STANDING):  acyclovir   Oral Tab/Cap 400 milliGRAM(s) Oral two times a day  albuterol/ipratropium for Nebulization 3 milliLiter(s) Nebulizer every 6 hours  allopurinol 100 milliGRAM(s) Oral daily  apixaban 5 milliGRAM(s) Oral every 12 hours  atorvastatin 40 milliGRAM(s) Oral at bedtime  chlorhexidine 2% Cloths 1 Application(s) Topical daily  chlorhexidine 4% Liquid 1 Application(s) Topical daily  metoprolol succinate  milliGRAM(s) Oral daily  multivitamin 1 Tablet(s) Oral daily  sacubitril 49 mG/valsartan 51 mG 1 Tablet(s) Oral two times a day  spironolactone 25 milliGRAM(s) Oral <User Schedule>    MEDICATIONS  (PRN):  guaiFENesin Oral Liquid (Sugar-Free) 100 milliGRAM(s) Oral every 6 hours PRN Cough  senna 2 Tablet(s) Oral at bedtime PRN Constipation      Vitals:  T(C): 36.5 (09-19-22 @ 14:20), Max: 36.9 (09-18-22 @ 21:22)  HR: 82 (09-19-22 @ 14:20) (82 - 85)  BP: 148/88 (09-19-22 @ 14:20) (135/80 - 148/88)  RR: 17 (09-19-22 @ 14:20) (17 - 18)  SpO2: 98% (09-19-22 @ 14:20) (97% - 100%)    Physical Examination:   General - NAD, pleasant, cooperative, confused that he did not have teeth  CV - Peripheral pulses palpable, no edema  Eyes - Fundoscopy not well visualized    Neurologic Exam:  Mental status - Awake, Alert, Oriented to person but not to place and time. Attn/conc dec with difficulties with serial 7s and spelling WORLD backwards. Speech fluent, repetition and naming intact. Follows simple and complex commands. Fund of knowledge not intact. Recent and remote memory poor    Cranial nerves:  CN II: Visual fields are full to confrontation. Pupils are 4 mm and briskly reactive to light (4 to 3mm b.l).   CN III, IV, VI: EOMI, no nystagmus, no ptosis  CN V: Facial sensation is intact to pinprick in all 3 divisions bilaterally.  CN VII: Face is symmetric with normal eye closure and smile.  CN VII: Hearing is normal to rubbing fingers  CN IX, X: Palate elevates symmetrically. Phonation is normal.  CN XI: Head turning and shoulder shrug are intact  CN XII: Tongue is midline with normal movements and no atrophy.    Motor - Normal bulk and tone throughout. No pronator drift of out-stretched arms.  Strength testing            Deltoid      Biceps      Triceps     Wrist Extension    Wrist Flexion     Interossei         R            5                 5               5                     5                              5                        5                 5  L             5                 5               5                     5                              5                        5                 5              Hip Flexion    Hip Extension    Knee Flexion    Knee Extension    Dorsiflexion    Plantar Flexion  R              5                           5                       5                           5                            5                          5  L              5                           5                        5                           5                            5                          5    Sensation - Light touch intact in fingers and toes     DTR's -             Biceps      Triceps     Brachioradialis      Patellar    Ankle    Toes/plantar response  R             1+             1+                  1+                       2+            1+                 Mute   L              1+             1+                 1+                        2+           1+                 Down    Coordination - There are no abnormal or extraneous movements.     Gait and station - Posture is normal. Gait is steady with short steps, base, arm swing, and turning. Romberg (-)    Labs:                        9.1    3.02  )-----------( 145      ( 19 Sep 2022 07:24 )             30.9     09-19    143  |  109<H>  |  23  ----------------------------<  88  4.2   |  19<L>  |  1.12    Ca    9.0      19 Sep 2022 07:26  Phos  2.5     09-19  Mg     1.8     09-19    TPro  7.1  /  Alb  3.7  /  TBili  0.7  /  DBili  x   /  AST  27  /  ALT  38  /  AlkPhos  328<H>  09-19    CAPILLARY BLOOD GLUCOSE        LIVER FUNCTIONS - ( 19 Sep 2022 07:26 )  Alb: 3.7 g/dL / Pro: 7.1 g/dL / ALK PHOS: 328 U/L / ALT: 38 U/L / AST: 27 U/L / GGT: 169 U/L         Culture - Blood (collected 16 Sep 2022 16:45)  Source: .Blood Blood-Venous  Preliminary Report (17 Sep 2022 20:01):    No growth to date.    Culture - Blood (collected 16 Sep 2022 16:25)  Source: .Blood Blood  Preliminary Report (17 Sep 2022 20:01):    No growth to date.        CSF:                  Radiology:  CT (09/16/2022)  - Chronic encephalomalacia/gliosis in the right parietal-temporal-occipital region, compatible with a chronic infarct in the inferior division of the right middle cerebral artery vascular territory, is stable dating back to 05/07/2019.

## 2022-09-19 NOTE — PROGRESS NOTE ADULT - PROBLEM SELECTOR PLAN 5
- Appears overall euvolemic on exam   - Pro-BNP 57780 <- 72106 (from last admission), troponin 41   - HFrEF with TTE from 09/2022 with EF 30%, severe global left ventricular systolic dysfunction. Mild RV enlargement with decreased RV systolic function    Plan:   - Continue home medications with hold parameters   - Metoprolol succinate 100 mg PO QD  - Entresto 49/51 PO BID with hold parameters   - spironolactone 25 mg PO QD

## 2022-09-20 LAB
ALBUMIN SERPL ELPH-MCNC: 3.4 G/DL — SIGNIFICANT CHANGE UP (ref 3.3–5)
ALP SERPL-CCNC: 260 U/L — HIGH (ref 40–120)
ALT FLD-CCNC: 29 U/L — SIGNIFICANT CHANGE UP (ref 10–45)
ANION GAP SERPL CALC-SCNC: 12 MMOL/L — SIGNIFICANT CHANGE UP (ref 5–17)
AST SERPL-CCNC: 20 U/L — SIGNIFICANT CHANGE UP (ref 10–40)
BILIRUB SERPL-MCNC: 0.8 MG/DL — SIGNIFICANT CHANGE UP (ref 0.2–1.2)
BUN SERPL-MCNC: 21 MG/DL — SIGNIFICANT CHANGE UP (ref 7–23)
CALCIUM SERPL-MCNC: 8.8 MG/DL — SIGNIFICANT CHANGE UP (ref 8.4–10.5)
CHLORIDE SERPL-SCNC: 109 MMOL/L — HIGH (ref 96–108)
CO2 SERPL-SCNC: 21 MMOL/L — LOW (ref 22–31)
CREAT SERPL-MCNC: 1.02 MG/DL — SIGNIFICANT CHANGE UP (ref 0.5–1.3)
EGFR: 79 ML/MIN/1.73M2 — SIGNIFICANT CHANGE UP
FOLATE SERPL-MCNC: 17 NG/ML — SIGNIFICANT CHANGE UP
GLUCOSE SERPL-MCNC: 82 MG/DL — SIGNIFICANT CHANGE UP (ref 70–99)
HCT VFR BLD CALC: 29.3 % — LOW (ref 39–50)
HGB BLD-MCNC: 8.8 G/DL — LOW (ref 13–17)
MAGNESIUM SERPL-MCNC: 1.8 MG/DL — SIGNIFICANT CHANGE UP (ref 1.6–2.6)
MCHC RBC-ENTMCNC: 26.4 PG — LOW (ref 27–34)
MCHC RBC-ENTMCNC: 30 GM/DL — LOW (ref 32–36)
MCV RBC AUTO: 88 FL — SIGNIFICANT CHANGE UP (ref 80–100)
NRBC # BLD: 0 /100 WBCS — SIGNIFICANT CHANGE UP (ref 0–0)
PHOSPHATE SERPL-MCNC: 2.3 MG/DL — LOW (ref 2.5–4.5)
PLATELET # BLD AUTO: 156 K/UL — SIGNIFICANT CHANGE UP (ref 150–400)
POTASSIUM SERPL-MCNC: 4.1 MMOL/L — SIGNIFICANT CHANGE UP (ref 3.5–5.3)
POTASSIUM SERPL-SCNC: 4.1 MMOL/L — SIGNIFICANT CHANGE UP (ref 3.5–5.3)
PROT SERPL-MCNC: 6.6 G/DL — SIGNIFICANT CHANGE UP (ref 6–8.3)
RBC # BLD: 3.33 M/UL — LOW (ref 4.2–5.8)
RBC # FLD: 19.9 % — HIGH (ref 10.3–14.5)
SODIUM SERPL-SCNC: 142 MMOL/L — SIGNIFICANT CHANGE UP (ref 135–145)
WBC # BLD: 3.16 K/UL — LOW (ref 3.8–10.5)
WBC # FLD AUTO: 3.16 K/UL — LOW (ref 3.8–10.5)

## 2022-09-20 PROCEDURE — 71046 X-RAY EXAM CHEST 2 VIEWS: CPT | Mod: 26

## 2022-09-20 PROCEDURE — 99232 SBSQ HOSP IP/OBS MODERATE 35: CPT | Mod: GC

## 2022-09-20 PROCEDURE — 78226 HEPATOBILIARY SYSTEM IMAGING: CPT | Mod: 26

## 2022-09-20 PROCEDURE — 99223 1ST HOSP IP/OBS HIGH 75: CPT | Mod: GC

## 2022-09-20 RX ADMIN — Medication 3 MILLILITER(S): at 05:22

## 2022-09-20 RX ADMIN — APIXABAN 5 MILLIGRAM(S): 2.5 TABLET, FILM COATED ORAL at 18:17

## 2022-09-20 RX ADMIN — APIXABAN 5 MILLIGRAM(S): 2.5 TABLET, FILM COATED ORAL at 05:21

## 2022-09-20 RX ADMIN — Medication 100 MILLIGRAM(S): at 12:48

## 2022-09-20 RX ADMIN — SPIRONOLACTONE 25 MILLIGRAM(S): 25 TABLET, FILM COATED ORAL at 12:47

## 2022-09-20 RX ADMIN — Medication 400 MILLIGRAM(S): at 05:22

## 2022-09-20 RX ADMIN — Medication 3 MILLILITER(S): at 00:03

## 2022-09-20 RX ADMIN — Medication 3 MILLILITER(S): at 12:47

## 2022-09-20 RX ADMIN — Medication 100 MILLIGRAM(S): at 05:21

## 2022-09-20 RX ADMIN — SACUBITRIL AND VALSARTAN 1 TABLET(S): 24; 26 TABLET, FILM COATED ORAL at 18:17

## 2022-09-20 RX ADMIN — Medication 1 TABLET(S): at 12:48

## 2022-09-20 RX ADMIN — CHLORHEXIDINE GLUCONATE 1 APPLICATION(S): 213 SOLUTION TOPICAL at 12:49

## 2022-09-20 RX ADMIN — Medication 400 MILLIGRAM(S): at 18:17

## 2022-09-20 RX ADMIN — Medication 63.75 MILLIMOLE(S): at 13:02

## 2022-09-20 RX ADMIN — SACUBITRIL AND VALSARTAN 1 TABLET(S): 24; 26 TABLET, FILM COATED ORAL at 05:21

## 2022-09-20 RX ADMIN — ATORVASTATIN CALCIUM 40 MILLIGRAM(S): 80 TABLET, FILM COATED ORAL at 22:03

## 2022-09-20 NOTE — PROGRESS NOTE ADULT - ASSESSMENT
72M hx DLBCL (tx with R-CHOP in 2003, with relapse in 2009 treated with BR) now with multiple areas of palpable lymphadenopathy with biopsies shown to be at least grade IIIA follicular lymphoma. Recent hospitalization for hemolysis due to G6PD deficiency and rasburicase. Had been started on O-COEP last admission. Now presenting with encephalopathy.    #Encephalopathy  - Workup for metabolic causes has been negative thus far  - Agree with MRI brain; may reveal CNS involvement of DLBCL  - Recommend LP to evaluate for CNS involvement of DLBCL  - Neurology now following; appreciate recs.    #Relapsed Diffuse Large B-Cell Lymphoma  Follows with Dr. Cordova at [facility]. Originally diagnosed in 2003 s/p RCHOP with relapse in 2009 s/p BR. 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 During last admission, had excisional biopsy but was performed after starting treatment.  - s/p excisional biopsy of back lesion on 9/7 showing DLBCL  - Patient is on treatment with a modified regimen of mini-COEP plus Obinutuzumab. C1 Started on 9/1/22 (cycle length q21 days) but full dose obinutuzumab was started 9/2/22.  - Eventual plan to treat with the 3rd weekly dose of obinutuzumab of cycle 1 (was due 9/16/22), pending further workup of encephalopathy.       Blayne Barnett MD  Hematology/Oncology Fellow PGY-4  Pager: [facility] [phone] / [place] [phone]   After 5pm and on weekends please page on-call fellow  72M hx DLBCL (tx with R-CHOP in 2003, with relapse of DLBCL in 2009 treated with BR) now with multiple areas of palpable lymphadenopathy with biopsies shown to be relapse of DLBCL. Recent hospitalization for hemolysis due to G6PD deficiency and rasburicase. Had been started on O-COEP last admission. Now presenting with encephalopathy.    #Encephalopathy  - Workup for metabolic causes has been negative thus far  - Agree with MRI brain; may reveal CNS involvement of DLBCL  - Recommend LP to evaluate for CNS involvement of DLBCL  - Neurology now following; appreciate recs.    #Relapsed Diffuse Large B-Cell Lymphoma  Follows with Dr. Cordova at [facility]. Originally diagnosed in 2003 s/p RCHOP with relapse in 2009 s/p BR. 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 During last admission, had excisional biopsy but was performed after starting treatment.  - s/p excisional biopsy of back lesion on 9/7 showing DLBCL  - Patient is on treatment with a modified regimen of mini-COEP plus Obinutuzumab. C1 Started on 9/1/22 (cycle length q21 days) but full dose obinutuzumab was started 9/2/22.  - Eventual plan to treat with the 3rd weekly dose of obinutuzumab of cycle 1 (was due 9/16/22), pending further workup of encephalopathy.       Blayne Barnett MD  Hematology/Oncology Fellow PGY-4  Pager: [facility] [phone] / [place] [phone]   After 5pm and on weekends please page on-call fellow

## 2022-09-20 NOTE — PROGRESS NOTE ADULT - PROBLEM SELECTOR PLAN 5
- Appears overall euvolemic on exam   - Pro-BNP 40070 <- 12607 (from last admission), troponin 41   - HFrEF with TTE from 09/2022 with EF 30%, severe global left ventricular systolic dysfunction. Mild RV enlargement with decreased RV systolic function    Plan:   - Continue home medications with hold parameters   - Metoprolol succinate 100 mg PO QD  - Entresto 49/51 PO BID with hold parameters   - spironolactone 25 mg PO QD

## 2022-09-20 NOTE — PROGRESS NOTE ADULT - ASSESSMENT
72 year old male with afib (on eliquis), HTN,  complete heart block s/p PPM, HLD, HFrEF (30% in 09/2022), and DLBCL (tx with R-CHOP in 2003, with relapse in 2009 treated with BR) now with recently diagnosed grade 3 follicular lymphoma who was transferred from [facility] for acute on chronic encephalopathy likely 2/2 rhino/entero viral pneumonia i/s/o follicular lymphoma. Possible etiology includes autoimmune vs paraneoplastic. Low likelihood of seizures given no previous history but does have a potential nidus in the left parietal infarct as seen by limited MRI per Neuro.    72 year old male with afib (on eliquis), HTN,  complete heart block s/p PPM, HLD, HFrEF (30% in 09/2022), and DLBCL (tx with R-CHOP in 2003, with relapse in 2009 treated with BR) now with recently diagnosed grade 3 follicular lymphoma who was transferred from [facility] for acute on chronic encephalopathy likely 2/2 rhino/entero viral pneumonia i/s/o follicular lymphoma and ongoing chemotherapy treatment. Possible etiologies include drug induced 2/2 chemo vs autoimmune vs paraneoplastic. Low likelihood of seizures given no previous history but does have a potential nidus in the left parietal infarct as seen by limited MRI per Neuro.

## 2022-09-20 NOTE — PROGRESS NOTE ADULT - PROBLEM SELECTOR PLAN 1
- At encounter, patient A&Ox2. Daughter notes that patient is A&Ox3 at baseline but had episodes of confusion at times, mostly during last hospitalization.   - Etiology may be underlying dementia, delirium, vs metabolic causes vs neurological vs malignancy  - CTH stable chronic infarct without acute findings  - New cough and RVP positive for rhino/enteroviral pneumonia is c/w viral pneumonia which may be contributory.   - Also consider CNS involvement in lymphoma with leptomeningeal disease  Plan:  - Supportive care of rhino-enteroviral pneumonia  - CXR with loculated effusion minimally increased from 08/23. However, not seen on CXR from 09/2022.   - CT chest with no acute findings   - infectious workup negative    - Check syphilis screen, TSH, B12: all normal   - f/u brain MRI followed by EEG study   - Consider LP to eval for encephalitis vs leptomeningeal disease if patient fails ot improve or if above work up remains unrevealing - At encounter, patient A&Ox2. Daughter notes that patient is A&Ox3 at baseline but had episodes of confusion at times, mostly during last hospitalization.   - Etiology may be underlying dementia, delirium, vs metabolic causes vs neurological vs malignancy  - CTH stable chronic infarct without acute findings  - New cough and RVP positive for rhino/enteroviral pneumonia is c/w viral pneumonia which may be contributory.   - Also consider CNS involvement in lymphoma with leptomeningeal disease  Plan:  - Supportive care of rhino-enteroviral pneumonia  - CXR with loculated effusion minimally increased from 08/23. However, not seen on CXR from 09/2022.   - CT chest with no acute findings   - infectious workup negative    - Check syphilis screen, TSH, B12: all normal   - f/u autoimmune encephalitis and paraneoplastic serum panels   - f/u vEEG and MRI head    - Consider LP to eval for encephalitis vs leptomeningeal disease if patient fails ot improve or if above work up remains unrevealing

## 2022-09-20 NOTE — PROGRESS NOTE ADULT - ATTENDING COMMENTS
72 year old male with Afib (on eliquis), HTN,  complete heart block s/p PPM, HLD, HFrEF (30% in 09/2022), and DLBCL (tx with R-CHOP in 2003, with relapse in 2009 treated) now with recently diagnosed grade 3 follicular lymphoma who was transferred from [facility] for AMS. He presented for treatment at [facility] and  was later found wandering in the parking lot confused. Per daughter, in the morning of admisson, patient was somewhat agitated and slightly confused in AM and was reoriented.    In the ED, patient afebrile, HR 82, /74, RR 16 (96% on room air). CTH without acute findings, X-ray with mildly increased right loculated effusion. RVP + for rhino/enterovirus.     # Enterovirus pneumonia       CW supportive care  / Antitussive meds and DUoneb as needed       Monitor SPO2       CT of chest with no evidence of consolidations   # Acute on CHronic Encephalopathy      Exacerbated by current viral  infection      Patient remains pleasantly confused and noted that patient was recently DC from the hosp on 9/12 at that hospitalization , he was confused then      Neuro has seen patient and they recommend to repeat MRI and to f/up paraneoplastic and autoimmune Ab.   #DLBCL     appreciate Hem/onc rec---> f/up MRI of brain and LP     DAUGHTER WANTS TO FIND OUT FROM ONC IF PT CAN HAVE CHEMO SOON AND to be evaluated for METAPORT while in the hosp.  I spoke with patient's wife at the bedside and I called and spoken with Vonnie Pitt [phone]       Leonie Mckayuerre   Hospitalist   [phone] /TEAMS

## 2022-09-20 NOTE — PROGRESS NOTE ADULT - ATTENDING COMMENTS
72-yr-old man with rDLBCL, previously treated with R-CHOP (2003) and BR (2010) recently presented with LAD, biopsy consistent with DLBCL. The patient  was started on O-COEP s/p C1 treatment as inpatient. Patient now presents with encephalopathy. Besides CNS imaging and w/u for metabolic causes for encephalopathy the patient will need an LP prior to further treatment.

## 2022-09-20 NOTE — PROGRESS NOTE ADULT - SUBJECTIVE AND OBJECTIVE BOX
GRIMM, RICHARD  72y  Male      Patient is a 72y old  Male who presents with a chief complaint of Confusion (19 Sep 2022 15:32)      INTERVAL HPI/OVERNIGHT EVENTS:  NAEO   Patient not agitated overnight req ativan   QTC elevated so cannot give antipsychotics at this time   Pending MRI and EEG study after   Neuro following   ROS negative, AMS waxing and waning   Denies any RUQ pain       T(C): 37 (09-20-22 @ 05:07), Max: 37 (09-20-22 @ 05:07)  HR: 98 (09-20-22 @ 05:07) (82 - 98)  BP: 131/71 (09-20-22 @ 05:07) (131/71 - 148/88)  RR: 18 (09-20-22 @ 05:07) (17 - 18)  SpO2: 98% (09-20-22 @ 05:07) (92% - 98%)  Wt(kg): --Vital Signs Last 24 Hrs  T(C): 37 (20 Sep 2022 05:07), Max: 37 (20 Sep 2022 05:07)  T(F): 98.6 (20 Sep 2022 05:07), Max: 98.6 (20 Sep 2022 05:07)  HR: 98 (20 Sep 2022 05:07) (82 - 98)  BP: 131/71 (20 Sep 2022 05:07) (131/71 - 148/88)  BP(mean): --  RR: 18 (20 Sep 2022 05:07) (17 - 18)  SpO2: 98% (20 Sep 2022 05:07) (92% - 98%)    Parameters below as of 20 Sep 2022 05:07  Patient On (Oxygen Delivery Method): room air      rasburicase (Other)      PHYSICAL EXAM:  GENERAL: Alert.  No acute distress. Appears confused   HEAD:  Atraumatic. Normocephalic.  EYES: EOMI. PERRLA. Normal conjunctiva/sclera.  ENT: No JVD. Moist oral mucosa.    CARDIAC: Regular rate and rhythm. Not irregularly irregular. S1. S2. No murmur.    LUNG/CHEST: Good bilateral air entry  ABDOMEN: Soft. No tenderness. No distension.  Normal bowel sounds.  EXTREMITIES:  No clubbing. No cyanosis. No edema. Moving all 4.  NEUROLOGY: A&Ox2 (name and place)    Consultant(s) Notes Reviewed:  [x ] YES  [ ] NO  Care Discussed with Consultants/Other Providers [ x] YES  [ ] NO    LABS:        RADIOLOGY & ADDITIONAL TESTS:    Imaging Personally Reviewed:  [ ] YES  [ ] NO  acyclovir   Oral Tab/Cap 400 milliGRAM(s) Oral two times a day  albuterol/ipratropium for Nebulization 3 milliLiter(s) Nebulizer every 6 hours  allopurinol 100 milliGRAM(s) Oral daily  apixaban 5 milliGRAM(s) Oral every 12 hours  atorvastatin 40 milliGRAM(s) Oral at bedtime  chlorhexidine 2% Cloths 1 Application(s) Topical daily  chlorhexidine 4% Liquid 1 Application(s) Topical daily  guaiFENesin Oral Liquid (Sugar-Free) 100 milliGRAM(s) Oral every 6 hours PRN  metoprolol succinate  milliGRAM(s) Oral daily  multivitamin 1 Tablet(s) Oral daily  sacubitril 49 mG/valsartan 51 mG 1 Tablet(s) Oral two times a day  senna 2 Tablet(s) Oral at bedtime PRN  spironolactone 25 milliGRAM(s) Oral <User Schedule>      HEALTH ISSUES - PROBLEM Dx:  Altered mental status    Positive D dimer    High alkaline phosphatase    Follicular lymphoma    Chronic systolic congestive heart failure    Atrial fibrillation    Prophylactic measure    DM type 2 (diabetes mellitus, type 2)  Never on insulin    Loculated pleural effusion    Chronic atrial fibrillation             GRIMM, RICHARD  72y  Male      Patient is a 72y old  Male who presents with a chief complaint of Confusion (19 Sep 2022 15:32)      INTERVAL HPI/OVERNIGHT EVENTS:  NAEO   Patient not agitated overnight and did not req ativan   QTC elevated so cannot give antipsychotics at this time   Pending veeg and MRI study   Neuro following   ROS negative, AMS waxing and waning   Denies any RUQ pain       T(C): 37 (09-20-22 @ 05:07), Max: 37 (09-20-22 @ 05:07)  HR: 98 (09-20-22 @ 05:07) (82 - 98)  BP: 131/71 (09-20-22 @ 05:07) (131/71 - 148/88)  RR: 18 (09-20-22 @ 05:07) (17 - 18)  SpO2: 98% (09-20-22 @ 05:07) (92% - 98%)  Wt(kg): --Vital Signs Last 24 Hrs  T(C): 37 (20 Sep 2022 05:07), Max: 37 (20 Sep 2022 05:07)  T(F): 98.6 (20 Sep 2022 05:07), Max: 98.6 (20 Sep 2022 05:07)  HR: 98 (20 Sep 2022 05:07) (82 - 98)  BP: 131/71 (20 Sep 2022 05:07) (131/71 - 148/88)  BP(mean): --  RR: 18 (20 Sep 2022 05:07) (17 - 18)  SpO2: 98% (20 Sep 2022 05:07) (92% - 98%)    Parameters below as of 20 Sep 2022 05:07  Patient On (Oxygen Delivery Method): room air      rasburicase (Other)      PHYSICAL EXAM:  GENERAL: Alert.  No acute distress. Appears confused   HEAD:  Atraumatic. Normocephalic.  EYES: EOMI. PERRLA. Normal conjunctiva/sclera.  ENT: No JVD. Moist oral mucosa.    CARDIAC: Regular rate and rhythm. Not irregularly irregular. S1. S2. No murmur.    LUNG/CHEST: Good bilateral air entry  ABDOMEN: Soft. No tenderness. No distension.  Normal bowel sounds.  EXTREMITIES:  No clubbing. No cyanosis. No edema. Moving all 4.  NEUROLOGY: A&Ox2 (name and place)    Consultant(s) Notes Reviewed:  [x ] YES  [ ] NO  Care Discussed with Consultants/Other Providers [ x] YES  [ ] NO    LABS:                          8.8    3.16  )-----------( 156      ( 20 Sep 2022 07:06 )             29.3       09-20    142  |  109<H>  |  21  ----------------------------<  82  4.1   |  21<L>  |  1.02    Ca    8.8      20 Sep 2022 07:11  Phos  2.3     09-20  Mg     1.8     09-20    TPro  6.6  /  Alb  3.4  /  TBili  0.8  /  DBili  x   /  AST  20  /  ALT  29  /  AlkPhos  260<H>  09-20      RADIOLOGY & ADDITIONAL TESTS:    Imaging Personally Reviewed:  [ ] YES  [ ] NO  acyclovir   Oral Tab/Cap 400 milliGRAM(s) Oral two times a day  albuterol/ipratropium for Nebulization 3 milliLiter(s) Nebulizer every 6 hours  allopurinol 100 milliGRAM(s) Oral daily  apixaban 5 milliGRAM(s) Oral every 12 hours  atorvastatin 40 milliGRAM(s) Oral at bedtime  chlorhexidine 2% Cloths 1 Application(s) Topical daily  chlorhexidine 4% Liquid 1 Application(s) Topical daily  guaiFENesin Oral Liquid (Sugar-Free) 100 milliGRAM(s) Oral every 6 hours PRN  metoprolol succinate  milliGRAM(s) Oral daily  multivitamin 1 Tablet(s) Oral daily  sacubitril 49 mG/valsartan 51 mG 1 Tablet(s) Oral two times a day  senna 2 Tablet(s) Oral at bedtime PRN  spironolactone 25 milliGRAM(s) Oral <User Schedule>      HEALTH ISSUES - PROBLEM Dx:  Altered mental status    Positive D dimer    High alkaline phosphatase    Follicular lymphoma    Chronic systolic congestive heart failure    Atrial fibrillation    Prophylactic measure    DM type 2 (diabetes mellitus, type 2)  Never on insulin    Loculated pleural effusion    Chronic atrial fibrillation

## 2022-09-20 NOTE — PROGRESS NOTE ADULT - SUBJECTIVE AND OBJECTIVE BOX
Hematology Follow-up    INTERVAL HPI/OVERNIGHT EVENTS:  Patient S&E at bedside. Now A&O x3, patient resting comfortably. No complaints at this time. Patient denies fever, chills, dizziness, weakness, CP, palpitations, SOB, cough, N/V/D/C, dysuria, changes in bowel movements, LE edema.    VITAL SIGNS:  T(F): 98.2 (09-20-22 @ 12:46)  HR: 105 (09-20-22 @ 12:46)  BP: 142/86 (09-20-22 @ 12:46)  RR: 18 (09-20-22 @ 12:46)  SpO2: 97% (09-20-22 @ 12:46)  Wt(kg): --    PHYSICAL EXAM:  Constitutional: AAOx3, NAD,   Eyes: PERRL, EOMI, sclera non-icteric  Neck: supple, no masses, no JVD  Respiratory: CTA b/l, good air entry b/l, no wheezing, rhonchi, rales, with normal respiratory effort and no intercostal retractions  Cardiovascular: RRR, normal S1S2, no M/R/G  Gastrointestinal: soft, NTND, no masses palpable, BS normal in all four quadrants, no HSM  Extremities:  no c/c/e  Neurological: Grossly intact  Skin: Normal temperature    MEDICATIONS  (STANDING):  acyclovir   Oral Tab/Cap 400 milliGRAM(s) Oral two times a day  albuterol/ipratropium for Nebulization 3 milliLiter(s) Nebulizer every 6 hours  allopurinol 100 milliGRAM(s) Oral daily  apixaban 5 milliGRAM(s) Oral every 12 hours  atorvastatin 40 milliGRAM(s) Oral at bedtime  metoprolol succinate  milliGRAM(s) Oral daily  multivitamin 1 Tablet(s) Oral daily  sacubitril 49 mG/valsartan 51 mG 1 Tablet(s) Oral two times a day  spironolactone 25 milliGRAM(s) Oral <User Schedule>    MEDICATIONS  (PRN):  guaiFENesin Oral Liquid (Sugar-Free) 100 milliGRAM(s) Oral every 6 hours PRN Cough  senna 2 Tablet(s) Oral at bedtime PRN Constipation      rasburicase (Other)      LABS:                        8.8    3.16  )-----------( 156      ( 20 Sep 2022 07:06 )             29.3     09-20    142  |  109<H>  |  21  ----------------------------<  82  4.1   |  21<L>  |  1.02    Ca    8.8      20 Sep 2022 07:11  Phos  2.3     09-20  Mg     1.8     09-20    TPro  6.6  /  Alb  3.4  /  TBili  0.8  /  DBili  x   /  AST  20  /  ALT  29  /  AlkPhos  260<H>  09-20           RADIOLOGY & ADDITIONAL TESTS:  Studies reviewed.

## 2022-09-20 NOTE — PROGRESS NOTE ADULT - PROBLEM SELECTOR PLAN 3
Patient with elevated ALK Phos on admission, but denies and RUQ pain or postprandial pain  - RUQ ultrasound to evaluate for biliary changes: Cholelithiasis with gallbladder sludge. Gallbladder wall thickening with intramural edema.  - CTM for pain

## 2022-09-21 LAB
ALBUMIN SERPL ELPH-MCNC: 3.6 G/DL — SIGNIFICANT CHANGE UP (ref 3.3–5)
ALP SERPL-CCNC: 338 U/L — HIGH (ref 40–120)
ALT FLD-CCNC: 31 U/L — SIGNIFICANT CHANGE UP (ref 10–45)
ANION GAP SERPL CALC-SCNC: 11 MMOL/L — SIGNIFICANT CHANGE UP (ref 5–17)
AST SERPL-CCNC: 22 U/L — SIGNIFICANT CHANGE UP (ref 10–40)
BILIRUB SERPL-MCNC: 0.7 MG/DL — SIGNIFICANT CHANGE UP (ref 0.2–1.2)
BLD GP AB SCN SERPL QL: NEGATIVE — SIGNIFICANT CHANGE UP
BUN SERPL-MCNC: 20 MG/DL — SIGNIFICANT CHANGE UP (ref 7–23)
CALCIUM SERPL-MCNC: 9 MG/DL — SIGNIFICANT CHANGE UP (ref 8.4–10.5)
CHLORIDE SERPL-SCNC: 106 MMOL/L — SIGNIFICANT CHANGE UP (ref 96–108)
CO2 SERPL-SCNC: 23 MMOL/L — SIGNIFICANT CHANGE UP (ref 22–31)
CREAT SERPL-MCNC: 1.04 MG/DL — SIGNIFICANT CHANGE UP (ref 0.5–1.3)
CULTURE RESULTS: SIGNIFICANT CHANGE UP
CULTURE RESULTS: SIGNIFICANT CHANGE UP
EGFR: 77 ML/MIN/1.73M2 — SIGNIFICANT CHANGE UP
GLUCOSE SERPL-MCNC: 111 MG/DL — HIGH (ref 70–99)
HCT VFR BLD CALC: 31.7 % — LOW (ref 39–50)
HGB BLD-MCNC: 9.6 G/DL — LOW (ref 13–17)
MAGNESIUM SERPL-MCNC: 1.8 MG/DL — SIGNIFICANT CHANGE UP (ref 1.6–2.6)
MCHC RBC-ENTMCNC: 26.6 PG — LOW (ref 27–34)
MCHC RBC-ENTMCNC: 30.3 GM/DL — LOW (ref 32–36)
MCV RBC AUTO: 87.8 FL — SIGNIFICANT CHANGE UP (ref 80–100)
NRBC # BLD: 0 /100 WBCS — SIGNIFICANT CHANGE UP (ref 0–0)
PHOSPHATE SERPL-MCNC: 3.2 MG/DL — SIGNIFICANT CHANGE UP (ref 2.5–4.5)
PLATELET # BLD AUTO: 234 K/UL — SIGNIFICANT CHANGE UP (ref 150–400)
POTASSIUM SERPL-MCNC: 3.8 MMOL/L — SIGNIFICANT CHANGE UP (ref 3.5–5.3)
POTASSIUM SERPL-SCNC: 3.8 MMOL/L — SIGNIFICANT CHANGE UP (ref 3.5–5.3)
PROT SERPL-MCNC: 7.3 G/DL — SIGNIFICANT CHANGE UP (ref 6–8.3)
RBC # BLD: 3.61 M/UL — LOW (ref 4.2–5.8)
RBC # FLD: 20 % — HIGH (ref 10.3–14.5)
RH IG SCN BLD-IMP: POSITIVE — SIGNIFICANT CHANGE UP
SODIUM SERPL-SCNC: 140 MMOL/L — SIGNIFICANT CHANGE UP (ref 135–145)
SPECIMEN SOURCE: SIGNIFICANT CHANGE UP
SPECIMEN SOURCE: SIGNIFICANT CHANGE UP
WBC # BLD: 4.24 K/UL — SIGNIFICANT CHANGE UP (ref 3.8–10.5)
WBC # FLD AUTO: 4.24 K/UL — SIGNIFICANT CHANGE UP (ref 3.8–10.5)

## 2022-09-21 PROCEDURE — 93286 PERI-PX EVAL PM/LDLS PM IP: CPT | Mod: 26,76

## 2022-09-21 PROCEDURE — 70553 MRI BRAIN STEM W/O & W/DYE: CPT | Mod: 26

## 2022-09-21 PROCEDURE — 99232 SBSQ HOSP IP/OBS MODERATE 35: CPT | Mod: GC

## 2022-09-21 RX ORDER — LANOLIN ALCOHOL/MO/W.PET/CERES
3 CREAM (GRAM) TOPICAL AT BEDTIME
Refills: 0 | Status: DISCONTINUED | OUTPATIENT
Start: 2022-09-21 | End: 2022-10-03

## 2022-09-21 RX ADMIN — APIXABAN 5 MILLIGRAM(S): 2.5 TABLET, FILM COATED ORAL at 18:31

## 2022-09-21 RX ADMIN — Medication 3 MILLILITER(S): at 14:24

## 2022-09-21 RX ADMIN — Medication 1 TABLET(S): at 14:24

## 2022-09-21 RX ADMIN — APIXABAN 5 MILLIGRAM(S): 2.5 TABLET, FILM COATED ORAL at 05:07

## 2022-09-21 RX ADMIN — Medication 400 MILLIGRAM(S): at 05:08

## 2022-09-21 RX ADMIN — Medication 400 MILLIGRAM(S): at 18:31

## 2022-09-21 RX ADMIN — Medication 100 MILLIGRAM(S): at 14:24

## 2022-09-21 RX ADMIN — SPIRONOLACTONE 25 MILLIGRAM(S): 25 TABLET, FILM COATED ORAL at 14:24

## 2022-09-21 RX ADMIN — SACUBITRIL AND VALSARTAN 1 TABLET(S): 24; 26 TABLET, FILM COATED ORAL at 05:07

## 2022-09-21 RX ADMIN — Medication 100 MILLIGRAM(S): at 05:08

## 2022-09-21 RX ADMIN — ATORVASTATIN CALCIUM 40 MILLIGRAM(S): 80 TABLET, FILM COATED ORAL at 21:40

## 2022-09-21 RX ADMIN — Medication 3 MILLILITER(S): at 00:25

## 2022-09-21 RX ADMIN — SACUBITRIL AND VALSARTAN 1 TABLET(S): 24; 26 TABLET, FILM COATED ORAL at 18:31

## 2022-09-21 RX ADMIN — Medication 3 MILLIGRAM(S): at 21:40

## 2022-09-21 NOTE — PROGRESS NOTE ADULT - PROBLEM SELECTOR PLAN 1
- Daughter notes that patient is A&Ox3 at baseline but had episodes of confusion at times, mostly during last hospitalization.   - Etiology may be underlying dementia, delirium, vs metabolic causes vs neurological vs malignancy  - CTH stable chronic infarct without acute findings  - New cough and RVP positive for rhino/enteroviral pneumonia is c/w viral pneumonia which may be contributory.   - Also consider CNS involvement in lymphoma with leptomeningeal disease    Plan:  - Supportive care of rhino-enteroviral pneumonia  - CXR with loculated effusion minimally increased from 08/23. However, not seen on CXR from 09/2022.   - CT chest with no acute findings   - infectious workup negative    - Check syphilis screen, TSH, B12: all normal   - f/u autoimmune encephalitis and paraneoplastic serum panels   - f/u vEEG and MRI head    - ONC requesting LP - Daughter notes that patient is A&Ox3 at baseline but had episodes of confusion at times, mostly during last hospitalization.   - Etiology may be underlying dementia, delirium, vs metabolic causes vs neurological vs malignancy  - CTH stable chronic infarct without acute findings  - New cough and RVP positive for rhino/enteroviral pneumonia is c/w viral pneumonia which may be contributory.   - Also consider CNS involvement in lymphoma with leptomeningeal disease    Plan:  - Supportive care of rhino-enteroviral pneumonia  - CXR with loculated effusion minimally increased from 08/23. However, not seen on CXR from 09/2022.   - CT chest with no acute findings   - infectious workup negative    - Check syphilis screen, TSH, B12: all normal   - f/u autoimmune encephalitis and paraneoplastic serum panels   - f/u vEEG and MRI head    - ONC requesting LP, Neuro awaiting results of MRI and veeg before pursuing

## 2022-09-21 NOTE — PROGRESS NOTE ADULT - PROBLEM SELECTOR PLAN 4
- History of DLBCL (tx with R-CHOP in 2003, with relapse in 2009 treated with BR) now with recently diagnosed grade 3 follicular lymphoma on treatment with a modified regimen of mini-COEP plus Obinutuzumab  - Hematology consulted. Appreciate recs   - C1 Started on 9/1/22 (cycle length q21 days) but full dose obinutuzumab was started 9/2/22. Second dose on 9/9/22  - He was due for his 3rd weekly dose of obinutuzumab of cycle 1 (9/16/22) but now on hold, pending further workup of encephalopathy  - onc following - History of DLBCL (tx with R-CHOP in 2003, with relapse in 2009 treated with BR) now with recently diagnosed grade 3 follicular lymphoma on treatment with a modified regimen of mini-COEP plus Obinutuzumab  - Hematology consulted. Appreciate recs   - C1 Started on 9/1/22 (cycle length q21 days) but full dose obinutuzumab was started 9/2/22. Second dose on 9/9/22  - He was due for his 3rd weekly dose of obinutuzumab of cycle 1 (9/16/22) but now on hold, pending further workup of encephalopathy  - onc following, requesting LP - History of DLBCL (tx with R-CHOP in 2003, with relapse in 2009 treated with BR) now with recently diagnosed grade 3 follicular lymphoma on treatment with a modified regimen of mini-COEP plus Obinutuzumab  - Hematology consulted. Appreciate recs   - C1 Started on 9/1/22 (cycle length q21 days) but full dose obinutuzumab was started 9/2/22. Second dose on 9/9/22  - He was due for his 3rd weekly dose of obinutuzumab of cycle 1 (9/16/22) but now on hold, pending further workup of encephalopathy  - requires a new appointment for metaport placement as they won't do it inpatient   - onc following, requesting LP

## 2022-09-21 NOTE — PROGRESS NOTE ADULT - PROBLEM SELECTOR PLAN 6
DIET: Dash/TLC  DVT Prophylaxis: Eliquis 5 mg BID  Dispo: Pending    Discussed with daughter, Vonnie regarding plan of care. DIET: Dash/TLC  DVT Prophylaxis: Eliquis 5 mg BID  Dispo: Pending    Discussed with daughterVonnie regarding plan of care.  (please update daughter with MRI results when they are read)

## 2022-09-21 NOTE — PROGRESS NOTE ADULT - SUBJECTIVE AND OBJECTIVE BOX
GRIMM, RICHARD  72y  Male      Patient is a 72y old  Male who presents with a chief complaint of Confusion (20 Sep 2022 10:17)      INTERVAL HPI/OVERNIGHT EVENTS:  NAEO   Patient is pending MRI study, EEG, and ONC is requesting an LP. Neuro notified.   HIDA scan negative for any cholelithiasis   Patient denies any CP, SOB, abd pain, dysuria or freq         T(C): 36.7 (09-21-22 @ 04:44), Max: 37 (09-20-22 @ 19:53)  HR: 97 (09-21-22 @ 04:44) (88 - 105)  BP: 152/87 (09-21-22 @ 04:44) (138/80 - 159/86)  RR: 18 (09-21-22 @ 04:44) (18 - 18)  SpO2: 97% (09-21-22 @ 04:44) (97% - 97%)  Wt(kg): --Vital Signs Last 24 Hrs  T(C): 36.7 (21 Sep 2022 04:44), Max: 37 (20 Sep 2022 19:53)  T(F): 98 (21 Sep 2022 04:44), Max: 98.6 (20 Sep 2022 19:53)  HR: 97 (21 Sep 2022 04:44) (88 - 105)  BP: 152/87 (21 Sep 2022 04:44) (138/80 - 159/86)  BP(mean): --  RR: 18 (21 Sep 2022 04:44) (18 - 18)  SpO2: 97% (21 Sep 2022 04:44) (97% - 97%)    Parameters below as of 21 Sep 2022 04:44  Patient On (Oxygen Delivery Method): room air      rasburicase (Other)      PHYSICAL EXAM:  GENERAL: Alert.  No acute distress. Appears confused   HEAD:  Atraumatic. Normocephalic.  EYES: EOMI. PERRLA. Normal conjunctiva/sclera.  ENT: No JVD. Moist oral mucosa.    CARDIAC: Regular rate and rhythm. Not irregularly irregular. S1. S2. No murmur.    LUNG/CHEST: Good bilateral air entry  ABDOMEN: Soft. No tenderness. No distension.  Normal bowel sounds.  EXTREMITIES:  No clubbing. No cyanosis. No edema. Moving all 4.  NEUROLOGY: A&Ox3 this AM     Consultant(s) Notes Reviewed:  [x ] YES  [ ] NO  Care Discussed with Consultants/Other Providers [ x] YES  [ ] NO    LABS:      RADIOLOGY & ADDITIONAL TESTS:    Imaging Personally Reviewed:  [ ] YES  [ ] NO  acyclovir   Oral Tab/Cap 400 milliGRAM(s) Oral two times a day  albuterol/ipratropium for Nebulization 3 milliLiter(s) Nebulizer every 6 hours  allopurinol 100 milliGRAM(s) Oral daily  apixaban 5 milliGRAM(s) Oral every 12 hours  atorvastatin 40 milliGRAM(s) Oral at bedtime  guaiFENesin Oral Liquid (Sugar-Free) 100 milliGRAM(s) Oral every 6 hours PRN  metoprolol succinate  milliGRAM(s) Oral daily  multivitamin 1 Tablet(s) Oral daily  sacubitril 49 mG/valsartan 51 mG 1 Tablet(s) Oral two times a day  senna 2 Tablet(s) Oral at bedtime PRN  spironolactone 25 milliGRAM(s) Oral <User Schedule>      HEALTH ISSUES - PROBLEM Dx:  Altered mental status    Positive D dimer    High alkaline phosphatase    Follicular lymphoma    Chronic systolic congestive heart failure    Atrial fibrillation    Prophylactic measure    DM type 2 (diabetes mellitus, type 2)  Never on insulin    Loculated pleural effusion    Chronic atrial fibrillation             GRIMM, RICHARD  72y  Male      Patient is a 72y old  Male who presents with a chief complaint of Confusion (20 Sep 2022 10:17)      INTERVAL HPI/OVERNIGHT EVENTS:  NAEO   Patient is pending MRI study, EEG, and ONC is requesting an LP. Neuro notified.   HIDA scan negative for any cholelithiasis   Patient denies any CP, SOB, abd pain, dysuria or freq         T(C): 36.7 (09-21-22 @ 04:44), Max: 37 (09-20-22 @ 19:53)  HR: 97 (09-21-22 @ 04:44) (88 - 105)  BP: 152/87 (09-21-22 @ 04:44) (138/80 - 159/86)  RR: 18 (09-21-22 @ 04:44) (18 - 18)  SpO2: 97% (09-21-22 @ 04:44) (97% - 97%)  Wt(kg): --Vital Signs Last 24 Hrs  T(C): 36.7 (21 Sep 2022 04:44), Max: 37 (20 Sep 2022 19:53)  T(F): 98 (21 Sep 2022 04:44), Max: 98.6 (20 Sep 2022 19:53)  HR: 97 (21 Sep 2022 04:44) (88 - 105)  BP: 152/87 (21 Sep 2022 04:44) (138/80 - 159/86)  BP(mean): --  RR: 18 (21 Sep 2022 04:44) (18 - 18)  SpO2: 97% (21 Sep 2022 04:44) (97% - 97%)    Parameters below as of 21 Sep 2022 04:44  Patient On (Oxygen Delivery Method): room air      rasburicase (Other)      PHYSICAL EXAM:  GENERAL: Alert.  No acute distress. Appears confused   HEAD:  Atraumatic. Normocephalic.  EYES: EOMI. PERRLA. Normal conjunctiva/sclera.  ENT: No JVD. Moist oral mucosa.    CARDIAC: Regular rate and rhythm. Not irregularly irregular. S1. S2. No murmur.    LUNG/CHEST: Good bilateral air entry  ABDOMEN: Soft. No tenderness. No distension.  Normal bowel sounds.  EXTREMITIES:  No clubbing. No cyanosis. No edema. Moving all 4.  NEUROLOGY: A&Ox3 this AM     Consultant(s) Notes Reviewed:  [x ] YES  [ ] NO  Care Discussed with Consultants/Other Providers [ x] YES  [ ] NO    LABS:                          9.6    4.24  )-----------( 234      ( 21 Sep 2022 07:52 )             31.7       09-21    140  |  106  |  20  ----------------------------<  111<H>  3.8   |  23  |  1.04    Ca    9.0      21 Sep 2022 07:52  Phos  3.2     09-21  Mg     1.8     09-21    TPro  7.3  /  Alb  3.6  /  TBili  0.7  /  DBili  x   /  AST  22  /  ALT  31  /  AlkPhos  338<H>  09-21            RADIOLOGY & ADDITIONAL TESTS:    Imaging Personally Reviewed:  [ ] YES  [ ] NO  acyclovir   Oral Tab/Cap 400 milliGRAM(s) Oral two times a day  albuterol/ipratropium for Nebulization 3 milliLiter(s) Nebulizer every 6 hours  allopurinol 100 milliGRAM(s) Oral daily  apixaban 5 milliGRAM(s) Oral every 12 hours  atorvastatin 40 milliGRAM(s) Oral at bedtime  guaiFENesin Oral Liquid (Sugar-Free) 100 milliGRAM(s) Oral every 6 hours PRN  metoprolol succinate  milliGRAM(s) Oral daily  multivitamin 1 Tablet(s) Oral daily  sacubitril 49 mG/valsartan 51 mG 1 Tablet(s) Oral two times a day  senna 2 Tablet(s) Oral at bedtime PRN  spironolactone 25 milliGRAM(s) Oral <User Schedule>      HEALTH ISSUES - PROBLEM Dx:  Altered mental status    Positive D dimer    High alkaline phosphatase    Follicular lymphoma    Chronic systolic congestive heart failure    Atrial fibrillation    Prophylactic measure    DM type 2 (diabetes mellitus, type 2)  Never on insulin    Loculated pleural effusion    Chronic atrial fibrillation

## 2022-09-21 NOTE — PROGRESS NOTE ADULT - PROBLEM SELECTOR PLAN 3
Patient with elevated ALK Phos on admission, but denies and RUQ pain or postprandial pain  - RUQ ultrasound to evaluate for biliary changes: Cholelithiasis with gallbladder sludge. Gallbladder wall thickening with intramural edema.  - HIDA scan with no findings of cholecystitis- Normal hepatobiliary scan. No radionuclide evidence of acute   cholecystitis.  - CTM for pain Patient with elevated ALK Phos on admission, but denies and RUQ pain or postprandial pain  - RUQ ultrasound to evaluate for biliary changes: Cholelithiasis with gallbladder sludge. Gallbladder wall thickening with intramural edema.  - HIDA scan with no findings of cholecystitis- Normal hepatobiliary scan. No radionuclide evidence of acute   cholecystitis.  - f/u hepatitis labs   - consider MRI abdomen if alk phos continue to up-trend   - CTM for pain

## 2022-09-21 NOTE — PROGRESS NOTE ADULT - ATTENDING COMMENTS
72 year old male with Afib (on eliquis), HTN,  complete heart block s/p PPM, HLD, HFrEF (30% in 09/2022), and DLBCL (tx with R-CHOP in 2003, with relapse in 2009 treated) now with recently diagnosed grade 3 follicular lymphoma who was [place] from [facility] for [place]. [place] [facility] [place] parking lot confused. Per daughter, in the morning of admisson, patient was somewhat agitated and slightly confused in AM and was reoriented.    In the ED, patient afebrile, HR 82, /74, RR 16 (96% on room air). CTH without acute findings, X-ray with mildly increased right loculated effusion. RVP + for rhino/enterovirus.     # Enterovirus pneumonia       CW supportive care  / Antitussive meds and DUoneb as needed       Monitor SPO2       CT of chest with no evidence of consolidations   # Acute on CHronic Encephalopathy      Exacerbated by current viral  infection      Patient remains pleasantly confused and noted that patient was recently DC from the hosp on 9/12 at that hospitalization , he was confused then      Neuro has seen patient and they recommend to repeat MRI and to f/up paraneoplastic and autoimmune Ab.   #DLBCL     appreciate Hem/onc rec---> f/up MRI of brain and LP     DAUGHTER WANTS TO FIND OUT FROM ONC IF PT CAN HAVE CHEMO SOON AND to be evaluated for METAPORT while in the hosp.  I spoke with patient's wife at the bedside and I called and spoken with Vonnie ( [phone] and she was at the bedside on 9/20       Leonie Steele   Hospitalist   [phone] /TEAMS

## 2022-09-21 NOTE — PROGRESS NOTE ADULT - PROBLEM SELECTOR PLAN 5
- Appears overall euvolemic on exam   - Pro-BNP 68015 <- 90629 (from last admission), troponin 41   - HFrEF with TTE from 09/2022 with EF 30%, severe global left ventricular systolic dysfunction. Mild RV enlargement with decreased RV systolic function    Plan:   - Continue home medications with hold parameters   - Metoprolol succinate 100 mg PO QD  - Entresto 49/51 PO BID with hold parameters   - spironolactone 25 mg PO QD

## 2022-09-22 LAB
ALBUMIN SERPL ELPH-MCNC: 3.6 G/DL — SIGNIFICANT CHANGE UP (ref 3.3–5)
ALP SERPL-CCNC: 303 U/L — HIGH (ref 40–120)
ALT FLD-CCNC: 28 U/L — SIGNIFICANT CHANGE UP (ref 10–45)
ANION GAP SERPL CALC-SCNC: 11 MMOL/L — SIGNIFICANT CHANGE UP (ref 5–17)
AST SERPL-CCNC: 19 U/L — SIGNIFICANT CHANGE UP (ref 10–40)
BILIRUB SERPL-MCNC: 0.7 MG/DL — SIGNIFICANT CHANGE UP (ref 0.2–1.2)
BUN SERPL-MCNC: 19 MG/DL — SIGNIFICANT CHANGE UP (ref 7–23)
CALCIUM SERPL-MCNC: 8.6 MG/DL — SIGNIFICANT CHANGE UP (ref 8.4–10.5)
CHLORIDE SERPL-SCNC: 107 MMOL/L — SIGNIFICANT CHANGE UP (ref 96–108)
CO2 SERPL-SCNC: 23 MMOL/L — SIGNIFICANT CHANGE UP (ref 22–31)
CREAT SERPL-MCNC: 1.02 MG/DL — SIGNIFICANT CHANGE UP (ref 0.5–1.3)
EGFR: 79 ML/MIN/1.73M2 — SIGNIFICANT CHANGE UP
GLUCOSE SERPL-MCNC: 104 MG/DL — HIGH (ref 70–99)
HAV IGM SER-ACNC: SIGNIFICANT CHANGE UP
HBV CORE IGM SER-ACNC: SIGNIFICANT CHANGE UP
HBV SURFACE AG SER-ACNC: SIGNIFICANT CHANGE UP
HCT VFR BLD CALC: 29.4 % — LOW (ref 39–50)
HCV AB S/CO SERPL IA: 0.15 S/CO — SIGNIFICANT CHANGE UP (ref 0–0.99)
HCV AB SERPL-IMP: SIGNIFICANT CHANGE UP
HGB BLD-MCNC: 8.9 G/DL — LOW (ref 13–17)
LDH SERPL L TO P-CCNC: 192 U/L — SIGNIFICANT CHANGE UP (ref 50–242)
MAGNESIUM SERPL-MCNC: 1.7 MG/DL — SIGNIFICANT CHANGE UP (ref 1.6–2.6)
MCHC RBC-ENTMCNC: 26.3 PG — LOW (ref 27–34)
MCHC RBC-ENTMCNC: 30.3 GM/DL — LOW (ref 32–36)
MCV RBC AUTO: 86.7 FL — SIGNIFICANT CHANGE UP (ref 80–100)
NRBC # BLD: 0 /100 WBCS — SIGNIFICANT CHANGE UP (ref 0–0)
PHOSPHATE SERPL-MCNC: 3 MG/DL — SIGNIFICANT CHANGE UP (ref 2.5–4.5)
PLATELET # BLD AUTO: 246 K/UL — SIGNIFICANT CHANGE UP (ref 150–400)
POTASSIUM SERPL-MCNC: 3.9 MMOL/L — SIGNIFICANT CHANGE UP (ref 3.5–5.3)
POTASSIUM SERPL-SCNC: 3.9 MMOL/L — SIGNIFICANT CHANGE UP (ref 3.5–5.3)
PROT SERPL-MCNC: 6.7 G/DL — SIGNIFICANT CHANGE UP (ref 6–8.3)
RBC # BLD: 3.39 M/UL — LOW (ref 4.2–5.8)
RBC # FLD: 20 % — HIGH (ref 10.3–14.5)
SODIUM SERPL-SCNC: 141 MMOL/L — SIGNIFICANT CHANGE UP (ref 135–145)
URATE SERPL-MCNC: 6.7 MG/DL — SIGNIFICANT CHANGE UP (ref 3.4–8.8)
WBC # BLD: 3.34 K/UL — LOW (ref 3.8–10.5)
WBC # FLD AUTO: 3.34 K/UL — LOW (ref 3.8–10.5)

## 2022-09-22 PROCEDURE — 95813 EEG EXTND MNTR 61-119 MIN: CPT | Mod: 26

## 2022-09-22 PROCEDURE — 99232 SBSQ HOSP IP/OBS MODERATE 35: CPT | Mod: GC

## 2022-09-22 RX ADMIN — SPIRONOLACTONE 25 MILLIGRAM(S): 25 TABLET, FILM COATED ORAL at 12:27

## 2022-09-22 RX ADMIN — APIXABAN 5 MILLIGRAM(S): 2.5 TABLET, FILM COATED ORAL at 17:27

## 2022-09-22 RX ADMIN — Medication 100 MILLIGRAM(S): at 06:32

## 2022-09-22 RX ADMIN — Medication 3 MILLILITER(S): at 06:34

## 2022-09-22 RX ADMIN — Medication 100 MILLIGRAM(S): at 12:27

## 2022-09-22 RX ADMIN — Medication 3 MILLIGRAM(S): at 21:45

## 2022-09-22 RX ADMIN — Medication 400 MILLIGRAM(S): at 17:27

## 2022-09-22 RX ADMIN — Medication 3 MILLILITER(S): at 23:05

## 2022-09-22 RX ADMIN — Medication 400 MILLIGRAM(S): at 06:32

## 2022-09-22 RX ADMIN — SACUBITRIL AND VALSARTAN 1 TABLET(S): 24; 26 TABLET, FILM COATED ORAL at 06:31

## 2022-09-22 RX ADMIN — Medication 3 MILLILITER(S): at 17:25

## 2022-09-22 RX ADMIN — Medication 1 TABLET(S): at 12:26

## 2022-09-22 RX ADMIN — Medication 3 MILLILITER(S): at 12:27

## 2022-09-22 RX ADMIN — SACUBITRIL AND VALSARTAN 1 TABLET(S): 24; 26 TABLET, FILM COATED ORAL at 17:27

## 2022-09-22 RX ADMIN — APIXABAN 5 MILLIGRAM(S): 2.5 TABLET, FILM COATED ORAL at 06:33

## 2022-09-22 RX ADMIN — ATORVASTATIN CALCIUM 40 MILLIGRAM(S): 80 TABLET, FILM COATED ORAL at 21:45

## 2022-09-22 NOTE — PROGRESS NOTE ADULT - PROBLEM SELECTOR PLAN 6
DIET: Dash/TLC  DVT Prophylaxis: Eliquis 5 mg BID  Dispo: Pending    Discussed with daughterVonnie regarding plan of care.  (please update daughter with MRI results when they are read)

## 2022-09-22 NOTE — PROGRESS NOTE ADULT - PROBLEM SELECTOR PLAN 1
- Daughter notes that patient is A&Ox3 at baseline but had episodes of confusion at times, mostly during last hospitalization.   - Etiology may be underlying dementia, delirium, vs metabolic causes vs neurological vs malignancy  - CTH stable chronic infarct without acute findings  - New cough and RVP positive for rhino/enteroviral pneumonia is c/w viral pneumonia which may be contributory.   - Also consider CNS involvement in lymphoma with leptomeningeal disease    Plan:  - Supportive care of rhino-enteroviral pneumonia  - CXR with loculated effusion minimally increased from 08/23. However, not seen on CXR from 09/2022.   - CT chest with no acute findings   - infectious workup negative    - Check syphilis screen, TSH, B12: all normal   - f/u autoimmune encephalitis and paraneoplastic serum panels   - f/u vEEG and MRI head    - ONC requesting LP, Neuro awaiting results of vEEG before pursuing; MR Head showing chronic posterior parietal infarct, otherwise no acute findings

## 2022-09-22 NOTE — PHYSICAL THERAPY INITIAL EVALUATION ADULT - PLANNED THERAPY INTERVENTIONS, PT EVAL
GOAL: Pt will negotiate 5 steps  up and down using HR support, independent  within 2-3 weeks./balance training/gait training

## 2022-09-22 NOTE — PHYSICAL THERAPY INITIAL EVALUATION ADULT - ADDITIONAL COMMENTS
per daughter, Pt. lives alone in apt.  no steps to get in. No HHA services.  Patient ambulated without AD independent. pt owns no DMEs. No h/o falls.

## 2022-09-22 NOTE — PROGRESS NOTE ADULT - PROBLEM SELECTOR PLAN 4
- History of DLBCL (tx with R-CHOP in 2003, with relapse in 2009 treated with BR) now with recently diagnosed grade 3 follicular lymphoma on treatment with a modified regimen of mini-COEP plus Obinutuzumab  - Hematology consulted. Appreciate recs   - C1 Started on 9/1/22 (cycle length q21 days) but full dose obinutuzumab was started 9/2/22. Second dose on 9/9/22  - He was due for his 3rd weekly dose of obinutuzumab of cycle 1 (9/16/22) but now on hold, pending further workup of encephalopathy  - requires a new appointment for metaport placement as they won't do it inpatient   - onc following, requesting LP

## 2022-09-22 NOTE — PROGRESS NOTE ADULT - SUBJECTIVE AND OBJECTIVE BOX
Patient is a 72y old  Male who presents with a chief complaint of Confusion (21 Sep 2022 07:43)      SUBJECTIVE / OVERNIGHT EVENTS:  - vitals stable; no events overnight    MEDICATIONS  (STANDING):  acyclovir   Oral Tab/Cap 400 milliGRAM(s) Oral two times a day  albuterol/ipratropium for Nebulization 3 milliLiter(s) Nebulizer every 6 hours  allopurinol 100 milliGRAM(s) Oral daily  apixaban 5 milliGRAM(s) Oral every 12 hours  atorvastatin 40 milliGRAM(s) Oral at bedtime  melatonin 3 milliGRAM(s) Oral at bedtime  metoprolol succinate  milliGRAM(s) Oral daily  multivitamin 1 Tablet(s) Oral daily  sacubitril 49 mG/valsartan 51 mG 1 Tablet(s) Oral two times a day  spironolactone 25 milliGRAM(s) Oral <User Schedule>    MEDICATIONS  (PRN):  guaiFENesin Oral Liquid (Sugar-Free) 100 milliGRAM(s) Oral every 6 hours PRN Cough  senna 2 Tablet(s) Oral at bedtime PRN Constipation      PHYSICAL EXAM:  Vital Signs Last 24 Hrs  T(C): 36.7 (22 Sep 2022 06:36), Max: 36.9 (21 Sep 2022 21:10)  T(F): 98 (22 Sep 2022 06:36), Max: 98.5 (21 Sep 2022 21:10)  HR: 83 (22 Sep 2022 06:36) (83 - 87)  BP: 148/79 (22 Sep 2022 06:36) (141/83 - 148/79)  BP(mean): --  RR: 18 (22 Sep 2022 06:36) (18 - 18)  SpO2: 98% (22 Sep 2022 06:36) (98% - 98%)    Parameters below as of 22 Sep 2022 06:36  Patient On (Oxygen Delivery Method): room air    GENERAL: Alert.  No acute distress. Appears confused   HEAD:  Atraumatic. Normocephalic.  EYES: EOMI. Normal conjunctiva/sclera.  ENT: No JVD. Moist oral mucosa.    CARDIAC: Regular rate and rhythm. Not irregularly irregular. S1. S2. No murmur.    LUNG/CHEST: Good bilateral air entry  ABDOMEN: Soft. No tenderness. No distension.  Normal bowel sounds.  EXTREMITIES:  No clubbing. No cyanosis. No edema. Moving all 4.  NEUROLOGY: A&Ox3 this AM     ----  I&O's Summary    ----  LABS:                        8.9    3.34  )-----------( 246      ( 22 Sep 2022 06:56 )             29.4     ----  09-21    140  |  106  |  20  ----------------------------<  111<H>  3.8   |  23  |  1.04    Ca    9.0      21 Sep 2022 07:52  Phos  3.2     09-21  Mg     1.8     09-21    TPro  7.3  /  Alb  3.6  /  TBili  0.7  /  DBili  x   /  AST  22  /  ALT  31  /  AlkPhos  338<H>  09-21    ----

## 2022-09-22 NOTE — PROGRESS NOTE ADULT - ASSESSMENT
72 year old male with afib (on eliquis), HTN,  complete heart block s/p PPM, HLD, HFrEF (30% in 09/2022), and DLBCL (tx with R-CHOP in 2003, with relapse in 2009 treated with BR) now with recently diagnosed grade 3 follicular lymphoma who was transferred from [facility] for acute on chronic encephalopathy likely 2/2 rhino/entero viral pneumonia i/s/o follicular lymphoma and ongoing chemotherapy treatment. Possible etiologies include drug induced 2/2 chemo vs autoimmune vs paraneoplastic. Low likelihood of seizures given no previous history but does have a potential nidus in the left parietal infarct as seen by limited MRI per Neuro

## 2022-09-22 NOTE — PHYSICAL THERAPY INITIAL EVALUATION ADULT - PERTINENT HX OF CURRENT PROBLEM, REHAB EVAL
71yo male pt with PMHx of Non-Hodgkins Lymphoma, Atrial flutter on Eliquis, DM type 2, Hypertension, Impaired Memory, Moderate Mitral Regurgitation, Pleural Effusion, Systolic Heart Failure, PAD, Cardiomyopathy, CAD, Anemia was transferred from [facility] for further evaluation. DLBCL on chemotherapy, now with AMS, suspect infectious / metabolic etiology. CTH stable chronic infarct without acute findings. elevated D dimer- patient is currently on elloquis for therapeutic AC i/s/o afib, and has reason for elevated d dimer in setting of cancer   CT Chest- deg changes. MRH- Chronic right posterior parietal stroke without significant interval   change. x ray chest- no bony abnormality.

## 2022-09-22 NOTE — PROGRESS NOTE ADULT - PROBLEM SELECTOR PLAN 5
- Appears overall euvolemic on exam   - Pro-BNP 62369 <- 29187 (from last admission), troponin 41   - HFrEF with TTE from 09/2022 with EF 30%, severe global left ventricular systolic dysfunction. Mild RV enlargement with decreased RV systolic function    Plan:   - Continue home medications with hold parameters   - Metoprolol succinate 100 mg PO QD  - Entresto 49/51 PO BID with hold parameters   - spironolactone 25 mg PO QD

## 2022-09-22 NOTE — PROGRESS NOTE ADULT - PROBLEM SELECTOR PLAN 3
Patient with elevated ALK Phos on admission, but denies and RUQ pain or postprandial pain  - RUQ ultrasound to evaluate for biliary changes: Cholelithiasis with gallbladder sludge. Gallbladder wall thickening with intramural edema.  - HIDA scan with no findings of cholecystitis- Normal hepatobiliary scan. No radionuclide evidence of acute   cholecystitis.  - f/u hepatitis labs   - consider MRI abdomen if alk phos continue to up-trend   - CTM for pain

## 2022-09-22 NOTE — PROGRESS NOTE ADULT - ATTENDING COMMENTS
72 year old male with Afib (on eliquis), HTN,  complete heart block s/p PPM, HLD, HFrEF (30% in 09/2022), and DLBCL (tx with R-CHOP in 2003, with relapse in 2009 treated) now with recently diagnosed grade 3 follicular lymphoma who was [place] from [facility] for [place]. [place] [facility] [place] parking lot confused. Per daughter, in the morning of admisson, patient was somewhat agitated and slightly confused in AM and was reoriented.    In the ED, patient afebrile, HR 82, /74, RR 16 (96% on room air). CTH without acute findings, X-ray with mildly increased right loculated effusion. RVP + for rhino/enterovirus.     # Enterovirus pneumonia       CW supportive care  / Antitussive meds and DUoneb as needed       Monitor SPO2       CT of chest with no evidence of consolidations   # Acute on CHronic Encephalopathy      Exacerbated by current viral  infection      Patient remains pleasantly confused and noted that patient was recently DC from the hosp on 9/12 at that hospitalization , he was confused then      Neuro has seen patient and they recommend to repeat MRI which was done with no acute finding and to f/up paraneoplastic and autoimmune Ab.     EEG is done and result is pend      Awaiting on LP to be done   #DLBCL     appreciate Hem/onc rec---> f/up MRI of brain and LP     DAUGHTER WANTS TO FIND OUT FROM ONC IF PT CAN HAVE CHEMO SOON AND to be evaluated for METAPORT while in the hosp.  I spoke with patient's wife at the bedside and I called and spoken with Vonnie ( [phone] and she was at the bedside on 9/20       Leonie Englandre   Hospitalist   [phone] /TEAMS 72 year old male with Afib (on eliquis), HTN,  complete heart block s/p PPM, HLD, HFrEF (30% in 09/2022), and DLBCL (tx with R-CHOP in 2003, with relapse in 2009 treated) now with recently diagnosed grade 3 follicular lymphoma who was transferred from [facility] for [place]MS. [place] [facility] [place] parking lot confused. Per daughter, in the morning of admisson, patient was somewhat agitated and slightly confused in AM and was reoriented.    In the ED, patient afebrile, HR 82, /74, RR 16 (96% on room air). CTH without acute findings, X-ray with mildly increased right loculated effusion. RVP + for rhino/enterovirus.     # Enterovirus pneumonia       CW supportive care  / Antitussive meds and DUoneb as needed       Monitor SPO2       CT of chest with no evidence of consolidations   # Acute on CHronic Encephalopathy      Exacerbated by current viral  infection      Patient remains pleasantly confused and noted that patient was recently DC from the hosp on 9/12 at that hospitalization , he was confused then      Neuro has seen patient and they recommend to repeat MRI which was done with no acute finding and to f/up paraneoplastic and autoimmune Ab.     EEG is pend      Awaiting on LP to be done   #DLBCL     appreciate Hem/onc rec---> f/up MRI of brain and LP   wife is updated     DAUGHTER WANTS TO FIND OUT FROM ONC IF PT CAN HAVE CHEMO SOON AND to be evaluated for METAPORT while in the hosp.  I spoke with patient's wife at the bedside and I called and spoken with Vonnie ( [phone] and she was at the bedside on 9/20       Leonie Englandre   Hospitalist   [phone] /TEAMS

## 2022-09-23 ENCOUNTER — APPOINTMENT (OUTPATIENT)
Dept: INFUSION THERAPY | Facility: HOSPITAL | Age: 72
End: 2022-09-23

## 2022-09-23 ENCOUNTER — TRANSCRIPTION ENCOUNTER (OUTPATIENT)
Age: 72
End: 2022-09-23

## 2022-09-23 LAB
ALBUMIN SERPL ELPH-MCNC: 3.5 G/DL — SIGNIFICANT CHANGE UP (ref 3.3–5)
ALP SERPL-CCNC: 278 U/L — HIGH (ref 40–120)
ALT FLD-CCNC: 26 U/L — SIGNIFICANT CHANGE UP (ref 10–45)
ANION GAP SERPL CALC-SCNC: 12 MMOL/L — SIGNIFICANT CHANGE UP (ref 5–17)
AST SERPL-CCNC: 20 U/L — SIGNIFICANT CHANGE UP (ref 10–40)
BILIRUB SERPL-MCNC: 0.7 MG/DL — SIGNIFICANT CHANGE UP (ref 0.2–1.2)
BUN SERPL-MCNC: 22 MG/DL — SIGNIFICANT CHANGE UP (ref 7–23)
CALCIUM SERPL-MCNC: 9.2 MG/DL — SIGNIFICANT CHANGE UP (ref 8.4–10.5)
CHLORIDE SERPL-SCNC: 107 MMOL/L — SIGNIFICANT CHANGE UP (ref 96–108)
CO2 SERPL-SCNC: 22 MMOL/L — SIGNIFICANT CHANGE UP (ref 22–31)
CREAT SERPL-MCNC: 1.09 MG/DL — SIGNIFICANT CHANGE UP (ref 0.5–1.3)
EGFR: 73 ML/MIN/1.73M2 — SIGNIFICANT CHANGE UP
GLUCOSE SERPL-MCNC: 101 MG/DL — HIGH (ref 70–99)
HCT VFR BLD CALC: 33.2 % — LOW (ref 39–50)
HGB BLD-MCNC: 9.6 G/DL — LOW (ref 13–17)
LDH SERPL L TO P-CCNC: 218 U/L — SIGNIFICANT CHANGE UP (ref 50–242)
MAGNESIUM SERPL-MCNC: 1.7 MG/DL — SIGNIFICANT CHANGE UP (ref 1.6–2.6)
MCHC RBC-ENTMCNC: 26.4 PG — LOW (ref 27–34)
MCHC RBC-ENTMCNC: 28.9 GM/DL — LOW (ref 32–36)
MCV RBC AUTO: 91.2 FL — SIGNIFICANT CHANGE UP (ref 80–100)
NRBC # BLD: 0 /100 WBCS — SIGNIFICANT CHANGE UP (ref 0–0)
PHOSPHATE SERPL-MCNC: 3.1 MG/DL — SIGNIFICANT CHANGE UP (ref 2.5–4.5)
PLATELET # BLD AUTO: 246 K/UL — SIGNIFICANT CHANGE UP (ref 150–400)
POTASSIUM SERPL-MCNC: 4 MMOL/L — SIGNIFICANT CHANGE UP (ref 3.5–5.3)
POTASSIUM SERPL-SCNC: 4 MMOL/L — SIGNIFICANT CHANGE UP (ref 3.5–5.3)
PROT SERPL-MCNC: 7 G/DL — SIGNIFICANT CHANGE UP (ref 6–8.3)
RBC # BLD: 3.64 M/UL — LOW (ref 4.2–5.8)
RBC # FLD: 20.1 % — HIGH (ref 10.3–14.5)
SARS-COV-2 RNA SPEC QL NAA+PROBE: SIGNIFICANT CHANGE UP
SODIUM SERPL-SCNC: 141 MMOL/L — SIGNIFICANT CHANGE UP (ref 135–145)
URATE SERPL-MCNC: 6.9 MG/DL — SIGNIFICANT CHANGE UP (ref 3.4–8.8)
WBC # BLD: 3.58 K/UL — LOW (ref 3.8–10.5)
WBC # FLD AUTO: 3.58 K/UL — LOW (ref 3.8–10.5)

## 2022-09-23 PROCEDURE — 99232 SBSQ HOSP IP/OBS MODERATE 35: CPT | Mod: GC

## 2022-09-23 PROCEDURE — 93010 ELECTROCARDIOGRAM REPORT: CPT | Mod: 76

## 2022-09-23 PROCEDURE — 99233 SBSQ HOSP IP/OBS HIGH 50: CPT

## 2022-09-23 PROCEDURE — 99222 1ST HOSP IP/OBS MODERATE 55: CPT

## 2022-09-23 RX ORDER — HEPARIN SODIUM 5000 [USP'U]/ML
INJECTION INTRAVENOUS; SUBCUTANEOUS
Qty: 25000 | Refills: 0 | Status: DISCONTINUED | OUTPATIENT
Start: 2022-09-23 | End: 2022-09-26

## 2022-09-23 RX ORDER — HEPARIN SODIUM 5000 [USP'U]/ML
INJECTION INTRAVENOUS; SUBCUTANEOUS
Qty: 25000 | Refills: 0 | Status: DISCONTINUED | OUTPATIENT
Start: 2022-09-23 | End: 2022-09-23

## 2022-09-23 RX ORDER — HEPARIN SODIUM 5000 [USP'U]/ML
6000 INJECTION INTRAVENOUS; SUBCUTANEOUS EVERY 6 HOURS
Refills: 0 | Status: DISCONTINUED | OUTPATIENT
Start: 2022-09-23 | End: 2022-09-26

## 2022-09-23 RX ORDER — HEPARIN SODIUM 5000 [USP'U]/ML
3000 INJECTION INTRAVENOUS; SUBCUTANEOUS EVERY 6 HOURS
Refills: 0 | Status: DISCONTINUED | OUTPATIENT
Start: 2022-09-23 | End: 2022-09-26

## 2022-09-23 RX ADMIN — Medication 3 MILLIGRAM(S): at 22:02

## 2022-09-23 RX ADMIN — APIXABAN 5 MILLIGRAM(S): 2.5 TABLET, FILM COATED ORAL at 05:42

## 2022-09-23 RX ADMIN — Medication 3 MILLILITER(S): at 05:42

## 2022-09-23 RX ADMIN — Medication 3 MILLILITER(S): at 17:23

## 2022-09-23 RX ADMIN — SACUBITRIL AND VALSARTAN 1 TABLET(S): 24; 26 TABLET, FILM COATED ORAL at 17:23

## 2022-09-23 RX ADMIN — HEPARIN SODIUM 1300 UNIT(S)/HR: 5000 INJECTION INTRAVENOUS; SUBCUTANEOUS at 22:07

## 2022-09-23 RX ADMIN — SACUBITRIL AND VALSARTAN 1 TABLET(S): 24; 26 TABLET, FILM COATED ORAL at 05:42

## 2022-09-23 RX ADMIN — Medication 400 MILLIGRAM(S): at 05:42

## 2022-09-23 RX ADMIN — Medication 0.5 MILLIGRAM(S): at 02:47

## 2022-09-23 RX ADMIN — Medication 1 TABLET(S): at 11:51

## 2022-09-23 RX ADMIN — SPIRONOLACTONE 25 MILLIGRAM(S): 25 TABLET, FILM COATED ORAL at 11:51

## 2022-09-23 RX ADMIN — Medication 100 MILLIGRAM(S): at 05:42

## 2022-09-23 RX ADMIN — Medication 100 MILLIGRAM(S): at 11:51

## 2022-09-23 RX ADMIN — Medication 3 MILLILITER(S): at 11:51

## 2022-09-23 RX ADMIN — ATORVASTATIN CALCIUM 40 MILLIGRAM(S): 80 TABLET, FILM COATED ORAL at 22:02

## 2022-09-23 RX ADMIN — Medication 400 MILLIGRAM(S): at 17:24

## 2022-09-23 NOTE — PROGRESS NOTE ADULT - ASSESSMENT
72 year old male with afib (on eliquis), HTN,  complete heart block s/p PPM, HLD, HFrEF (30% in 09/2022), and DLBCL (tx with R-CHOP in 2003, with relapse in 2009 treated with BR) now with recently diagnosed grade 3 follicular lymphoma who was transferred from [facility] for acute on chronic encephalopathy likely 2/2 rhino/entero viral pneumonia i/s/o follicular lymphoma and ongoing chemotherapy treatment. Possible etiologies include drug induced 2/2 chemo vs autoimmune vs paraneoplastic. Low likelihood of seizures given no previous history but does have a potential nidus in the left parietal infarct as seen by limited MRI per Neuro   72 year old male with afib (on eliquis), HTN,  complete heart block s/p PPM, HLD, HFrEF (30% in 09/2022), and DLBCL (tx with R-CHOP in 2003, with relapse in 2009 treated with BR) now with recently diagnosed grade 3 follicular lymphoma who was transferred from [facility] for acute on chronic encephalopathy likely 2/2 rhino/entero viral pneumonia i/s/o follicular lymphoma and ongoing chemotherapy treatment. Possible etiologies include drug induced 2/2 chemo vs autoimmune vs paraneoplastic.

## 2022-09-23 NOTE — BH CONSULTATION LIAISON ASSESSMENT NOTE - NSBHCHARTREVIEWLAB_PSY_A_CORE FT
9.6    3.58  )-----------( 246      ( 23 Sep 2022 07:31 )             33.2     09-23    141  |  107  |  22  ----------------------------<  101<H>  4.0   |  22  |  1.09    Ca    9.2      23 Sep 2022 07:31  Phos  3.1     09-23  Mg     1.7     09-23    TPro  7.0  /  Alb  3.5  /  TBili  0.7  /  DBili  x   /  AST  20  /  ALT  26  /  AlkPhos  278<H>  09-23

## 2022-09-23 NOTE — BH CONSULTATION LIAISON ASSESSMENT NOTE - NSBHCONSULTFOLLOWAFTERCARE_PSY_A_CORE FT
OP psych f/u at [facility]- [phone]  [facility] clinic- [phone]

## 2022-09-23 NOTE — DISCHARGE NOTE PROVIDER - NSDCMRMEDTOKEN_GEN_ALL_CORE_FT
acyclovir 400 mg oral tablet: 1 tab(s) orally 2 times a day  allopurinol 100 mg oral tablet: 1 tab(s) orally once a day  apixaban 5 mg oral tablet: 1 tab(s) orally every 12 hours  atorvastatin 40 mg oral tablet: 1 tab(s) orally once a day (at bedtime)  docusate sodium 100 mg oral capsule: 2 cap(s) orally once a day (at bedtime), As Needed  Entresto 49 mg-51 mg oral tablet: 1 tab(s) orally once a day   metoprolol succinate 100 mg oral tablet, extended release: 1 tab(s) orally once a day  Multiple Vitamins oral tablet: 1 tab(s) orally once a day  Physical Therapy - please evaluate and treat:   senna oral tablet: 2 tab(s) orally once a day (at bedtime), As Needed  spironolactone 25 mg oral tablet: 1 tab(s) orally once a day   acyclovir 400 mg oral tablet: 1 tab(s) orally 2 times a day  allopurinol 100 mg oral tablet: 1 tab(s) orally once a day  apixaban 5 mg oral tablet: 1 tab(s) orally every 12 hours  atorvastatin 40 mg oral tablet: 1 tab(s) orally once a day (at bedtime)  docusate sodium 100 mg oral capsule: 2 cap(s) orally once a day (at bedtime), As Needed  Entresto 49 mg-51 mg oral tablet: 1 tab(s) orally once a day   metoprolol succinate 100 mg oral tablet, extended release: 1 tab(s) orally once a day  Multiple Vitamins oral tablet: 1 tab(s) orally once a day  predniSONE 50 mg oral tablet: 2 tab(s) orally every 24 hours  Starting 10/1  senna oral tablet: 2 tab(s) orally once a day (at bedtime), As Needed  spironolactone 25 mg oral tablet: 1 tab(s) orally once a day   acyclovir 400 mg oral tablet: 1 tab(s) orally 2 times a day  allopurinol 100 mg oral tablet: 1 tab(s) orally once a day  apixaban 5 mg oral tablet: 1 tab(s) orally every 12 hours  atorvastatin 40 mg oral tablet: 1 tab(s) orally once a day (at bedtime)  docusate sodium 100 mg oral capsule: 2 cap(s) orally once a day (at bedtime), As Needed  Entresto 49 mg-51 mg oral tablet: 1 tab(s) orally once a day   metoprolol succinate 100 mg oral tablet, extended release: 1 tab(s) orally once a day  Multiple Vitamins oral tablet: 1 tab(s) orally once a day  predniSONE 50 mg oral tablet: 2 tab(s) orally every 24 hours  Starting 10/1  spironolactone 25 mg oral tablet: 1 tab(s) orally once a day   acyclovir 400 mg oral tablet: 1 tab(s) orally 2 times a day  allopurinol 100 mg oral tablet: 1 tab(s) orally once a day  apixaban 5 mg oral tablet: 1 tab(s) orally every 12 hours  atorvastatin 40 mg oral tablet: 1 tab(s) orally once a day (at bedtime)  docusate sodium 100 mg oral capsule: 2 cap(s) orally once a day (at bedtime), As Needed  Entresto 49 mg-51 mg oral tablet: 1 tab(s) orally once a day   metoprolol succinate 100 mg oral tablet, extended release: 1 tab(s) orally once a day  Multiple Vitamins oral tablet: 1 tab(s) orally once a day  spironolactone 25 mg oral tablet: 1 tab(s) orally once a day   acyclovir 400 mg oral tablet: 1 tab(s) orally 2 times a day  allopurinol 100 mg oral tablet: 1 tab(s) orally once a day  apixaban 5 mg oral tablet: 1 tab(s) orally every 12 hours  atorvastatin 40 mg oral tablet: 1 tab(s) orally once a day (at bedtime)  docusate sodium 100 mg oral capsule: 2 cap(s) orally once a day (at bedtime), As Needed  Entresto 49 mg-51 mg oral tablet: 1 tab(s) orally once a day   metoprolol succinate 100 mg oral tablet, extended release: 1 tab(s) orally once a day  Multiple Vitamins oral tablet: 1 tab(s) orally once a day  predniSONE 50 mg oral tablet: 2 tab(s) orally every 24 hours  Take it on 10/4   senna leaf extract oral tablet: 2 tab(s) orally once a day (at bedtime), As needed, Constipation  spironolactone 25 mg oral tablet: 1 tab(s) orally once a day

## 2022-09-23 NOTE — BH CONSULTATION LIAISON ASSESSMENT NOTE - NSBHCHARTREVIEWINVESTIGATE_PSY_A_CORE FT
< from: 12 Lead ECG (09.19.22 @ 13:37) >      Ventricular Rate 82 BPM    Atrial Rate 82 BPM    P-R Interval 174 ms    QRS Duration 148 ms    Q-T Interval 446 ms    QTC Calculation(Bazett) 521 ms    P Axis 40 degrees    R Axis -48 degrees    T Axis 125 degrees    Diagnosis Line Atrial-sensed ventricular-paced rhythm  ABNORMAL ECG  WHEN COMPARED WITH ECG OF 16-SEP-2022 13:42,  SIGNIFICANT CHANGES HAVE OCCURRED  Confirmed by KINGS PALACIOS PERWAIZ ([id number]) on 9/21/2022 1:47:37 PM    < end of copied text >

## 2022-09-23 NOTE — PROGRESS NOTE ADULT - PROBLEM SELECTOR PLAN 3
Patient with elevated ALK Phos on admission, but denies and RUQ pain or postprandial pain  - RUQ ultrasound to evaluate for biliary changes: Cholelithiasis with gallbladder sludge. Gallbladder wall thickening with intramural edema.  - HIDA scan with no findings of cholecystitis- Normal hepatobiliary scan. No radionuclide evidence of acute   cholecystitis.  - f/u hepatitis labs   - consider MRI abdomen if alk phos continue to up-trend   - CTM for pain Patient with elevated ALK Phos on admission, but denies and RUQ pain or postprandial pain  - RUQ ultrasound to evaluate for biliary changes: Cholelithiasis with gallbladder sludge. Gallbladder wall thickening with intramural edema.  - HIDA scan with no findings of cholecystitis- Normal hepatobiliary scan. No radionuclide evidence of acute   cholecystitis.  - hepatitis labs non reactive   - consider MRI abdomen if alk phos continue to up-trend   - CTM for pain Patient with elevated ALK Phos on admission, but denies and RUQ pain or postprandial pain  - RUQ ultrasound to evaluate for biliary changes: Cholelithiasis with gallbladder sludge. Gallbladder wall thickening with intramural edema.  - HIDA scan with no findings of cholecystitis- Normal hepatobiliary scan. No radionuclide evidence of acute   cholecystitis.  - hepatitis labs non reactive   - consider MRI abdomen if alk phos continue to up-trend   - CTM for pain  -? side effects of the Obinutuzumab

## 2022-09-23 NOTE — PROGRESS NOTE ADULT - PROBLEM SELECTOR PLAN 6
DIET: Dash/TLC  DVT Prophylaxis: Eliquis 5 mg BID  Dispo: Pending    Discussed with daughter, Vonnie regarding plan of care. DIET: Dash/TLC  DVT Prophylaxis: Eliquis 5 mg BID ( on HOLD for the LP )   Dispo: Pending    Discussed with daughterVonnie regarding plan of care.

## 2022-09-23 NOTE — DISCHARGE NOTE PROVIDER - HOSPITAL COURSE
This is a 72 year old male with a history of afib on eliquis, HTN, complete heart block s/p ppm, HLD, HFrEF, and DLBCL  now with recently diagnosed grade 3 follicular lymphoma who was transferred from [facility] for AMS in the setting of rhino/entero viral pneumonia. Supportive care was provided for his viral infection, however, the patient remained confused throughout his hospital stay. Imaging was negative for any infectious process. An MRI of the head with and without contrast as well as a 24 video EEG study were obtained at the request of the neurology team, and both were negative for any acute changes or seizure-like activity. The oncology team required an LP for further evaluation prior to resuming any chemotherapy, and the LP SHOWED... This is a 72 year old male with a history of afib on eliquis, HTN, complete heart block s/p ppm, HLD, HFrEF, and DLBCL  now with recently diagnosed grade 3 follicular lymphoma who was transferred from [facility] for AMS in the setting of rhino/entero viral pneumonia. Supportive care was provided for his viral infection, however, the patient remained confused throughout his hospital stay. Imaging was negative for any infectious process. An MRI of the head with and without contrast as well as a 24 video EEG study were obtained at the request of the neurology team, and both were negative for any acute changes or seizure-like activity. The oncology team required an LP for further evaluation prior to resuming any chemotherapy, and the LP SHOWED... This is a 72 year old male with a history of afib on eliquis, HTN, complete heart block s/p ppm, HLD, HFrEF, and DLBCL  now with recently diagnosed grade 3 follicular lymphoma who was transferred from [facility] for AMS in the setting of rhino/entero viral pneumonia. Supportive care was provided for his viral infection, however, the patient remained confused throughout his hospital stay. Imaging was negative for any infectious process. An MRI of the head with and without contrast as well as a 24 video EEG study were obtained at the request of the neurology team, and both were negative for any acute changes or seizure-like activity. Chest X-ray showed mildly increased right loculated effusion. The oncology team required an LP for further evaluation prior to resuming any chemotherapy, and the LP done on 9/26 revealed no acute findings so far. Oncology requested for flow cytometry. Neuroradiology was consulted and CSF fluid analysis showed scattered intermediate sized lymphoid cells on flow cytometry with insufficient cells to report.   Syphilis screen, TSH, B12 all normal. D Dimer was elevated to 280 but pt was non-hypoxic or tachypneic, denied chest pain, and no lower extremity pain/erythema. Pt is currently on eliquis for therapeutic AC in setting of a fib and has reason for elevated d dimer in setting of cancer. Per Oncology, there was no reason to pursue CT chest with contrast to evaluate for PE. Pt's pacemaker was interrogated prior to MRI during hospital stay and battery longevity showed 6.7 years. Video EEG showed mildly abnormal EEG- mild nonspecific diffuse cerebral dysfunction, no overt asymmetry despite known lesion, and no eliptiform abnormalities recorded.     Pt was due for 3rd weekly dose of obtinutuzumab of Cycle 1 (9/16/22) inpatient but was on hold pending further workup of encephalopathy. Oncology discussed inpatient chemo tx with daughter and planned for inpatient chemo tx via a PICC line with removal of the PICC prior to pt's discharge.      Pt is afebrile and medically stable on day of discharge and there are no further inpatient needs for pt to be hospitalized. Pt will receive chemo treatment via Mediport outpt with pt's outpt oncologist following discharge. Pt will receive oral chemo tx through the weekend and receive Neulasta treatment on Monday. Pt will continue with prednisone for 5 days after discharge. This is a 72 year old male with a history of afib on eliquis, HTN, complete heart block s/p ppm, HLD, HFrEF, and DLBCL  now with recently diagnosed grade 3 follicular lymphoma who was transferred from [facility] for AMS in the setting of rhino/entero viral pneumonia. Supportive care was provided for his viral infection, however, the patient remained confused throughout his hospital stay. Imaging was negative for any infectious process. An MRI of the head with and without contrast as well as a 24 video EEG study were obtained at the request of the neurology team, and both were negative for any acute changes or seizure-like activity. Chest X-ray showed mildly increased right loculated effusion. The oncology team required an LP for further evaluation prior to resuming any chemotherapy, and the LP done on 9/26 revealed no acute findings so far. Oncology requested for flow cytometry. Neuroradiology was consulted and CSF fluid analysis showed scattered intermediate sized lymphoid cells on flow cytometry with insufficient cells to report.   Syphilis screen, TSH, B12 all normal. D Dimer was elevated to 280 but pt was non-hypoxic or tachypneic, denied chest pain, and no lower extremity pain/erythema. Pt is currently on eliquis for therapeutic AC in setting of a fib and has reason for elevated d dimer in setting of cancer. Per Oncology, there was no reason to pursue CT chest with contrast to evaluate for PE. Pt's pacemaker was interrogated prior to MRI during hospital stay and battery longevity showed 6.7 years. Video EEG showed mildly abnormal EEG- mild nonspecific diffuse cerebral dysfunction, no overt asymmetry despite known lesion, and no eliptiform abnormalities recorded.     Pt was due for 3rd weekly dose of obtinutuzumab of Cycle 1 (9/16/22) inpatient but was on hold pending further workup of encephalopathy. Oncology discussed inpatient chemo tx with daughter and planned for inpatient chemo tx via a PICC line with removal of the PICC prior to pt's discharge.      Pt is afebrile and medically stable on day of discharge and there are no further inpatient needs for pt to remain hospitalized. Pt will receive chemo treatment via Mediport outpt with pt's outpt oncologist following discharge. Pt will receive oral chemo tx through the weekend and receive Neulasta treatment on Monday. Pt will continue with prednisone for 5 days after discharge. This is a 72 year old male with a history of afib on eliquis, HTN, complete heart block s/p ppm, HLD, HFrEF, and DLBCL  now with recently diagnosed grade 3 follicular lymphoma who was transferred from [facility] for AMS in the setting of rhino/entero viral pneumonia. Supportive care was provided for his viral infection, however, the patient remained confused throughout his hospital stay. Imaging was negative for any infectious process. An MRI of the head with and without contrast as well as a 24 video EEG study were obtained at the request of the neurology team, and both were negative for any acute changes or seizure-like activity. Chest X-ray showed mildly increased right loculated effusion. The oncology team required an LP for further evaluation prior to resuming any chemotherapy, and the LP done on 9/26 revealed no acute findings so far. Oncology requested for flow cytometry. Neuroradiology was consulted and CSF fluid analysis showed scattered intermediate sized lymphoid cells on flow cytometry with insufficient cells to report.   Syphilis screen, TSH, B12 all normal. D Dimer was elevated to 280 but pt was non-hypoxic or tachypneic, denied chest pain, and no lower extremity pain/erythema. Pt is currently on eliquis for therapeutic AC in setting of a fib and has reason for elevated d dimer in setting of cancer. Per Oncology, there was no reason to pursue CT chest with contrast to evaluate for PE. Pt's pacemaker was interrogated prior to MRI during hospital stay and battery longevity showed 6.7 years. Video EEG showed mildly abnormal EEG- mild nonspecific diffuse cerebral dysfunction, no overt asymmetry despite known lesion, and no eliptiform abnormalities recorded.     Pt was due for 3rd weekly dose of obtinutuzumab of Cycle 1 (9/16/22) inpatient but was on hold pending further workup of encephalopathy. Oncology discussed inpatient chemo tx with daughter and planned for inpatient chemo tx via a PICC line with removal of the PICC prior to pt's discharge. Pt's chemo tx C2 O-mini-COEP D1 started on 9/30 and today was day 4.  Pt received oral etopiside treatment on 10/1 and 10/2.    Pt is afebrile and medically stable on day of discharge and there are no further inpatient needs for pt to remain hospitalized. Pt will follow up with pt's hematologist Dr. Cordova following discharge. This is a 72 year old male with a history of afib on eliquis, HTN, complete heart block s/p ppm, HLD, HFrEF, and DLBCL  now with recently diagnosed grade 3 follicular lymphoma who was transferred from [facility] for AMS in the setting of rhino/entero viral pneumonia. Supportive care was provided for his viral infection, however, the patient remained confused throughout his hospital stay. Imaging was negative for any infectious process. An MRI of the head with and without contrast as well as a 24 video EEG study were obtained at the request of the neurology team, and both were negative for any acute changes or seizure-like activity. Chest X-ray showed mildly increased right loculated effusion. The oncology team required an LP for further evaluation prior to resuming any chemotherapy, and the LP done on 9/26 revealed no acute findings so far. Oncology requested for flow cytometry. Neuroradiology was consulted and CSF fluid analysis showed scattered intermediate sized lymphoid cells on flow cytometry with insufficient cells to report.   Syphilis screen, TSH, B12 all normal. D Dimer was elevated to 280 but pt was non-hypoxic or tachypneic, denied chest pain, and no lower extremity pain/erythema. Pt is currently on eliquis for therapeutic AC in setting of a fib and has reason for elevated d dimer in setting of cancer. Per Oncology, there was no reason to pursue CT chest with contrast to evaluate for PE. Pt's pacemaker was interrogated prior to MRI during hospital stay and battery longevity showed 6.7 years. Video EEG showed mildly abnormal EEG- mild nonspecific diffuse cerebral dysfunction, no overt asymmetry despite known lesion, and no eliptiform abnormalities recorded.     Pt was due for 3rd weekly dose of obtinutuzumab of Cycle 1 (9/16/22) inpatient but was on hold pending further workup of encephalopathy. Oncology discussed inpatient chemo tx with daughter and planned for inpatient chemo tx via a PICC line with removal of the PICC prior to pt's discharge. Pt's chemo tx C2 O-mini-COEP D1 started on 9/30 and today was day 4.  Pt received oral etopiside treatment on 10/1 and 10/2.    Pt is afebrile and medically stable on day of discharge and there are no further inpatient needs for pt to remain hospitalized. Pt will follow up with pt's hematologist Dr. Cordova following discharge for a mediport placement. Hemodynamically stable for discharge.

## 2022-09-23 NOTE — DISCHARGE NOTE PROVIDER - NSDCCPTREATMENT_GEN_ALL_CORE_FT
PRINCIPAL PROCEDURE  Procedure: MRI head  Findings and Treatment: Chronic right posterior parietal stroke without significant interval   change. No diffusion nor gradient echo abnormality to suggest acute on   chronic or hemorrhagic component.  No new areas concerning for acute transcortical infarction, hemorrhage,   nor mass. No abnormal focal enhancement.       PRINCIPAL PROCEDURE  Procedure: MRI head  Findings and Treatment: Chronic right posterior parietal stroke without significant interval   change. No diffusion nor gradient echo abnormality to suggest acute on   chronic or hemorrhagic component.  No new areas concerning for acute transcortical infarction, hemorrhage,   nor mass. No abnormal focal enhancement.      SECONDARY PROCEDURE  Procedure: Lumbar puncture  Findings and Treatment: Scattered intermediate sized lymphoid cells

## 2022-09-23 NOTE — DISCHARGE NOTE PROVIDER - NSFOLLOWUPCLINICSTOKEN_GEN_ALL_ED_FT
[id number]: || ||00\01||False;[id number]: || ||00\01||False; [id number]: || ||00\01||False;[id number]: || ||00\01||False;703863:2 weeks|| ||00\01||False; 600731:2 weeks|| ||00\01||False;[id number]: ||10/3/2022||02\00\00||False;533826:2 weeks|| ||00\01||False;

## 2022-09-23 NOTE — BH CONSULTATION LIAISON ASSESSMENT NOTE - HPI (INCLUDE ILLNESS QUALITY, SEVERITY, DURATION, TIMING, CONTEXT, MODIFYING FACTORS, ASSOCIATED SIGNS AND SYMPTOMS)
72M , domiciled, retired [employer] worker, with no formal PPhx, no prior SA or psych admissions, no active substance abuse, with PMH significant for afib (on eliquis), HTN,  complete heart block s/p PPM, HLD, HFrEF (30% in 09/2022), and DLBCL (tx with [facility] in 2003, with relapse in 2009 treated with BR) now with recently diagnosed grade 3 follicular lymphoma who was transferred from [facility] for AMS likely in the setting of rhino/entero viral pneumonia v.s. CNS involving lymphoma with leptomeningeal disease, psychiatry consulted for management of agitation.    Pt seen for interview, oriented to self only, aware he is in the hospital but states "[facility]."  Cannot provide month, date, year, or president.  States he is 52 years old, lives with his "mother, mother in law, wife, and 5 siblings."  Patient states that he came to the hospital to be "showered by multiple people" and that "the shower is causing his hair to fall out."  Pt denies depression, anxiety, racing thoughts, AVH, or paranoia.  Denies SI/HI or substance abuse.  Staff notes pt has been calm and pleasant, albeit confused, with some agitation last night.    Collateral obtained from pt's daughter with permission: daughter states pt was last at baseline a month ago, typically AOx3, but developed AMS after an infusion which prompted hospitalization until 9/12.  Subsequently, pt went home for a couple of days and was again at baseline; however, again developed AMS during chemo at [facility] on 9/15 and was resent to the hospital. Daughter notes the pt's agitation tends to occur at night time. When pt is agitated, he gets dressed to go home and attempts to leave the hospital. She states that Ativan has been administered to pt during hospital stay for agitation episodes.  She denies confusion at pt's baseline, knowledge of previous psychiatric history or suicidal/ homicidal thoughts.

## 2022-09-23 NOTE — EEG REPORT - NS EEG TEXT BOX
NIK GRIMM [id number] 72y (1950)M  Admitting MD: Dr. Cece Ayala    Study Date: 09-22-22   11:43-18:38 x1:45hrs  --------------------------------------------------------------------------------------------------  History:  CC/ HPI Patient is a 72y old  Male who presents with a chief complaint of Confusion (23 Sep 2022 07:38)    acyclovir   Oral Tab/Cap 400 milliGRAM(s) Oral two times a day  albuterol/ipratropium for Nebulization 3 milliLiter(s) Nebulizer every 6 hours  allopurinol 100 milliGRAM(s) Oral daily  apixaban 5 milliGRAM(s) Oral every 12 hours  atorvastatin 40 milliGRAM(s) Oral at bedtime  melatonin 3 milliGRAM(s) Oral at bedtime  metoprolol succinate  milliGRAM(s) Oral daily  multivitamin 1 Tablet(s) Oral daily  sacubitril 49 mG/valsartan 51 mG 1 Tablet(s) Oral two times a day  spironolactone 25 milliGRAM(s) Oral <User Schedule>    --------------------------------------------------------------------------------------------------  Study Interpretation:    [[[Abbreviation Key:  PDR=alpha rhythm/posterior dominant rhythm. A-P=anterior posterior gradient.  Amplitude: ‘very low’:<20; ‘low’:20-50; ‘medium’:; ‘high’:>200uV.  Persistence for periodic/rhythmic patterns (% of epoch) ‘rare’:<1%; ‘occasional’:1-10%; ‘frequent’:10-50%; ‘abundant’:50-90%; ‘continuous’:>90%.  Persistence for sporadic discharges: ‘rare’:<1/hr; ‘occasional’:1/min-1/hr; ‘frequent’:>1/min; ‘abundant’:>1/10 sec.  GRDA=generalized rhythmic delta activity; FIRDA=frontal intermittent GRDA; LRDA=lateralized rhythmic delta activity; TIRDA=temporal intermittent rhythmic delta activity;  LPD=PLED=lateralized periodic discharges; GPD=generalized periodic discharges; BiPDs=BiPLEDs=bilateral independent periodic epileptiform discharges; SIRPID=stimulus induced rhythmic, periodic, or ictal appearing discharges; BIRDs=brief potentially ictal rhythmic discharges >4 Hz, lasting .5-10s; PFA (paroxysmal bursts >13 Hz or =8 Hz).  Modifiers: +F=with fast component; +S=with spike component; +R=with rhythmic component.  S-B=burst suppression pattern.  Max=maximal. N1-drowsy; N2-stage II sleep; N3-slow wave sleep. SSS/BETS=small sharp spikes/benign epileptiform transients of sleep. HV=hyperventilation; PS=photic stimulation]]]    FINDINGS:  The background was continuous, spontaneously variable and reactive.  During wakefulness, the posteriorly dominant rhythm consisted of symmetric, well modulated 8-8.5 Hz activity, with an amplitude to 40 uV, that attenuated to eye opening.  Low amplitude central beta was noted in wakefulness.    Background Slowing:  Generalized slowing: mild excess of diffuse mostly theta was present.  Focal slowing: none was present.    Sleep Background:  -Drowsiness was characterized by fragmentation, attenuation, and slowing of the background activity.    -N2 was characterized by the presence of vertex waves, symmetric spindles, and K-complexes.    Epileptiform Activity:   No interictal epileptiform discharges were present.    Events:  No clinical events were recorded.  No seizures were recorded.    Activation Procedures:   -Hyperventilation was not performed.    -Photic stimulation was performed and did not elicit any abnormalities.      Artifacts:  Intermittent myogenic and external motion artifacts were noted.    ECG:  The heart rate on single channel ECG at baseline was predominantly near BPM = 60-70   -----------------------------------------------------------------------------------------------------    EEG Classification / Summary:  Mildly abnormal EEG study, awake / drowsy / asleep    -Mild slowing  -----------------------------------------------------------------------------------------------------    Clinical Impression:  Mild nonspecific diffuse cerebral dysfunction  No overt asymmetry despite known lesion.  There were no epileptiform abnormalities recorded.      -------------------------------------------------------------------------------------------------------  [facility] EEG Reading Room Ph#: [phone]  Epilepsy Answering Service after 5PM and before 8:30AM: Ph#: [phone]    Brien Price M.D.   of Neurology, [facility] Epilepsy [place]

## 2022-09-23 NOTE — BH CONSULTATION LIAISON ASSESSMENT NOTE - NS ED BHA SUICIDALITY PRESENT CURRENT PASSIVE IDEATION
[de-identified] : GENERAL APPEARANCE: Well nourished and hydrated, pleasant, alert, and oriented x 3. Appears their stated age. \par HEENT: Normocephalic, extraocular eye motion intact. Nasal septum midline. Oral cavity clear. External auditory canal clear. \par RESPIRATORY: Breath sounds clear and audible in all lobes. No wheezing, No accessory muscle use.\par CARDIOVASCULAR: No apparent abnormalities. right lower extremity edema. No varicosities. Pedal pulses are palpable.\par NEUROLOGIC: Sensation is normal, no muscle weakness in the upper or lower extremities.\par DERMATOLOGIC: No apparent skin lesions, moist, warm, no rash.\par SPINE: Cervical spine appears normal and moves freely; thoracic spine appears normal and moves freely; lumbosacral spine appears normal and moves freely, normal, nontender.\par MUSCULOSKELETAL: Hands, wrists, and elbows are normal and move freely, shoulders are normal and move freely. \par Musculoskeletal: Gait: normal and not antalgic . Right knee exam shows 10 degree flexion contracture present, ROM 10 - 110 degrees, lower extremity edema. \par 5/5 motor strength in bilateral lower extremities. Sensory: Intact in bilateral lower extremities. DTRs: Biceps, brachioradialis, triceps, patellar, ankle and plantar 2+ and symmetric bilaterally. Pulses: dorsalis pedis, posterior tibial, femoral, popliteal, and radial 2+ and symmetric bilaterally. \par Constitutional: Alert and in no acute distress and obese, but well-appearing and not in acute distress.  [de-identified] : Right knee exam shows 115 degrees of flexion and 15 degree contracture No

## 2022-09-23 NOTE — PROGRESS NOTE ADULT - PROBLEM SELECTOR PLAN 7
- History of afib w/ CHB s/p Micra pacemaker then s/p MDT CRT-P upgrade 9/30/19  - v paced on EKG, HR 74, Qtc 499     Plan:  - Continue metoprolol succinate 100 mg PO QD  - Eliquis 5 mg BID - History of afib w/ CHB s/p Micra pacemaker then s/p MDT CRT-P upgrade 9/30/19  - v paced on EKG, HR 74, Qtc 499     Plan:  - Continue metoprolol succinate 100 mg PO QD  - Eliquis 5 mg BID on hold for LP on Monday; restart following procedure

## 2022-09-23 NOTE — PROGRESS NOTE ADULT - SUBJECTIVE AND OBJECTIVE BOX
NEUROLOGY FOLLOW-UP CONSULT NOTE    RFC: Altered mental status (AMS)    Interval history: No acute neurologic events overnight. Patient continues to be confused and trying to leave the hospital, improved with Ativan. He is pacing the hallway and room and often joking. Denies any focal neurologic deficits.    Meds:  MEDICATIONS  (STANDING):  acyclovir   Oral Tab/Cap 400 milliGRAM(s) Oral two times a day  albuterol/ipratropium for Nebulization 3 milliLiter(s) Nebulizer every 6 hours  allopurinol 100 milliGRAM(s) Oral daily  atorvastatin 40 milliGRAM(s) Oral at bedtime  melatonin 3 milliGRAM(s) Oral at bedtime  metoprolol succinate  milliGRAM(s) Oral daily  multivitamin 1 Tablet(s) Oral daily  sacubitril 49 mG/valsartan 51 mG 1 Tablet(s) Oral two times a day  spironolactone 25 milliGRAM(s) Oral <User Schedule>    MEDICATIONS  (PRN):  guaiFENesin Oral Liquid (Sugar-Free) 100 milliGRAM(s) Oral every 6 hours PRN Cough  senna 2 Tablet(s) Oral at bedtime PRN Constipation      PMHx/PSHx/FHx/SHx:  Altered mental status    H/o or current diagnosis of HF- Contraindication to ACEI/ARBs    Family history of CHF (congestive heart failure)    Family history of non-Hodgkin&#x27;s lymphoma (Sibling)    Handoff    MEWS Score    Non-Hodgkins Lymphoma    Diabetes Mellitus    DM type 2 (diabetes mellitus, type 2)    Essential hypertension    Rhinitis, allergic    Systolic heart failure    Moderate mitral regurgitation    Pleural effusion    Atrial flutter, unspecified type    Impaired memory    Acute delirium    Delirium secondary to multiple medical problems    AMS (altered mental status)    DM type 2 (diabetes mellitus, type 2)    Altered mental status    Chronic systolic congestive heart failure    Atrial fibrillation    Prophylactic measure    Follicular lymphoma    Loculated pleural effusion    Positive D dimer    High alkaline phosphatase    Chronic atrial fibrillation    No significant past surgical history    Non-Hodgkin lymphoma    Cardiac pacemaker    SENT FOR DEHYDRATION    4    Dehydration    SysAdmin_VisitLink        Allergies:  rasburicase (Other)      ROS: All systems negative except as documented in Interval history    O:  T(C): 36.5 (09-23-22 @ 14:52), Max: 36.5 (09-22-22 @ 21:31)  HR: 89 (09-23-22 @ 14:52) (81 - 95)  BP: 158/90 (09-23-22 @ 14:52) (146/76 - 158/90)  RR: 18 (09-23-22 @ 14:52) (18 - 18)  SpO2: 98% (09-23-22 @ 14:52) (97% - 98%)    Focused neurologic exam:  MS - AAO x1, speech tangential and jocular but otherwise fluent, rep/naming intact, follows commands  CN - PERRLA, EOMI, VFF, face sens/str/hearing WNL b/l, tongue/palate midline, trap 5/5 b/l  Motor - Normal bulk/tone, 5/5 all  Sens - LT/temp intact all  DTR's - Would not cooperate for testing of this  Coord - Grossly appropriate for level of strength  Gait and station - Normal casual gait    Pertinent labs/studies:  CBC with dec WBC 3.58, low H/H 10/33 and MCV WNL, otherwise essentially WNL  BMP essentially WNL  Mg WNL, Phos WNL  Albumin WNL, LFT's with inc alk phos 278  B12 WNL, TSH WNL, NH3 WNL, A1C 5.4% WNL, syphilis serology TP (-), COVID (-)      vEEG 9/23 -  EEG Classification / Summary:  Mildly abnormal EEG study, awake / drowsy / asleep    -Mild slowing  -----------------------------------------------------------------------------------------------------    Clinical Impression:  Mild nonspecific diffuse cerebral dysfunction  No overt asymmetry despite known lesion.  There were no epileptiform abnormalities recorded.        MRI brain w/ and w/o 9/21 - Chronic right posterior parietal stroke without significant interval   change. No diffusion nor gradient echo abnormality to suggest acute on   chronic or hemorrhagic component.    No new areas concerning for acute transcortical infarction, hemorrhage,   nor mass. No abnormal focal enhancement.

## 2022-09-23 NOTE — PROGRESS NOTE ADULT - PROBLEM SELECTOR PLAN 1
- Daughter notes that patient is A&Ox3 at baseline but had episodes of confusion at times, mostly during last hospitalization.   - Etiology may be underlying dementia, delirium, vs metabolic causes vs neurological vs malignancy  - CTH stable chronic infarct without acute findings  - New cough and RVP positive for rhino/enteroviral pneumonia is c/w viral pneumonia which may be contributory.   - Also consider CNS involvement in lymphoma with leptomeningeal disease    Plan:  - Supportive care of rhino-enteroviral pneumonia  - CXR with loculated effusion minimally increased from 08/23. However, not seen on CXR from 09/2022.   - CT chest with no acute findings   - infectious workup negative    - Check syphilis screen, TSH, B12: all normal   - MRI with hronic right posterior parietal stroke without significant interval   change.   - f/u autoimmune encephalitis and paraneoplastic serum panels   - f/u vEEG   - ONC requesting LP, Neuro awaiting results of vEEG before pursuing; MR Head showing chronic posterior parietal infarct, otherwise no acute findings - Daughter notes that patient is A&Ox3 at baseline but had episodes of confusion at times, mostly during last hospitalization.   - Etiology may be underlying dementia, delirium, vs metabolic causes vs neurological vs malignancy  - CTH stable chronic infarct without acute findings  - New cough and RVP positive for rhino/enteroviral pneumonia is c/w viral pneumonia which may be contributory.   - Also consider CNS involvement in lymphoma with leptomeningeal disease    Plan:  - Supportive care of rhino-enteroviral pneumonia  - CXR with loculated effusion minimally increased from 08/23. However, not seen on CXR from 09/2022.   - CT chest with no acute findings   - infectious workup negative    - Check syphilis screen, TSH, B12: all normal   - MRI with chronic right posterior parietal stroke without significant interval   change.   - f/u autoimmune encephalitis and paraneoplastic serum panels   - f/u vEEG read   - ONC requesting LP, Neuro awaiting results of vEEG before pursuing; MR Head showing chronic posterior parietal infarct, otherwise no acute findings - Daughter notes that patient is A&Ox3 at baseline but had episodes of confusion at times, mostly during last hospitalization.   - Etiology may be underlying dementia, delirium, vs drug induced i/s/o chemotherapy vs metabolic causes vs neurological vs malignancy  - CTH stable chronic infarct without acute findings  - New cough and RVP positive for rhino/enteroviral pneumonia is c/w viral pneumonia which may be contributory.   - Also consider CNS involvement in lymphoma with leptomeningeal disease    Plan:  - Supportive care of rhino-enteroviral pneumonia  - CXR with loculated effusion minimally increased from 08/23. However, not seen on CXR from 09/2022.   - CT chest with no acute findings   - infectious workup negative    - Check syphilis screen, TSH, B12: all normal   - MRI with chronic right posterior parietal stroke without significant interval   change.   - f/u autoimmune encephalitis and paraneoplastic serum panels   - f/u vEEG read   - ONC requesting LP, Neuro awaiting results of vEEG before pursuing; MR Head showing chronic posterior parietal infarct, otherwise no acute findings - Daughter notes that patient is A&Ox3 at baseline but had episodes of confusion at times, mostly during last hospitalization.   - Etiology may be underlying dementia, delirium, vs drug induced i/s/o chemotherapy vs metabolic causes vs neurological vs malignancy  - CTH stable chronic infarct without acute findings  - New cough and RVP positive for rhino/enteroviral pneumonia is c/w viral pneumonia which may be contributory.   - Also consider CNS involvement in lymphoma with leptomeningeal disease    Plan:  - Supportive care of rhino-enteroviral pneumonia  - CXR with loculated effusion minimally increased from 08/23. However, not seen on CXR from 09/2022.   - CT chest with no acute findings   - infectious workup negative    - Check syphilis screen, TSH, B12: all normal   - MRI with chronic right posterior parietal stroke without significant interval   change.   - EEG with Mild nonspecific diffuse cerebral dysfunction; No overt asymmetry despite known lesion; There were no epileptiform abnormalities recorded.    - f/u autoimmune encephalitis and paraneoplastic serum panels   - ONC requesting LP, Neuro awaiting results of vEEG before pursuing; MR Head showing chronic posterior parietal infarct, otherwise no acute findings - Daughter notes that patient is A&Ox3 at baseline but had episodes of confusion at times, mostly during last hospitalization.   - Etiology may be underlying dementia, delirium, vs drug induced i/s/o chemotherapy vs metabolic causes vs neurological vs malignancy  - CTH stable chronic infarct without acute findings  - New cough and RVP positive for rhino/enteroviral pneumonia is c/w viral pneumonia which may be contributory.   - Also consider CNS involvement in lymphoma with leptomeningeal disease    Plan:  - Supportive care of rhino-enteroviral pneumonia  - CXR with loculated effusion minimally increased from 08/23. However, not seen on CXR from 09/2022.   - CT chest with no acute findings   - infectious workup negative    - Check syphilis screen, TSH, B12: all normal   - MRI with chronic right posterior parietal stroke without significant interval   change.   - EEG with Mild nonspecific diffuse cerebral dysfunction; No overt asymmetry despite known lesion; There were no epileptiform abnormalities recorded.    - f/u autoimmune encephalitis and paraneoplastic serum panels   - Onc requesting LP; Scheduled for this coming Monday; f/u CSF studies, restart Eliquis following LP - Daughter notes that patient is A&Ox3 at baseline but had episodes of confusion at times, mostly during last hospitalization.   - Etiology may be underlying dementia, delirium, vs drug induced i/s/o chemotherapy vs metabolic causes vs neurological vs malignancy  - CTH stable chronic infarct without acute findings  - New cough and RVP positive for rhino/enteroviral pneumonia is c/w viral pneumonia which may be contributory.   - Also consider CNS involvement in lymphoma with leptomeningeal disease or side effects from the medications     Plan:  - Supportive care of rhino-enteroviral pneumonia  - CXR with loculated effusion minimally increased from 08/23. However, not seen on CXR from 09/2022.   - CT chest with no acute findings   - infectious workup negative    - Check syphilis screen, TSH, B12: all normal   - MRI with chronic right posterior parietal stroke without significant interval   change.   - EEG with Mild nonspecific diffuse cerebral dysfunction; No overt asymmetry despite known lesion; There were no epileptiform abnormalities recorded.    - f/u autoimmune encephalitis and paraneoplastic serum panels   - Onc requesting LP; Scheduled for this coming Monday; f/u CSF studies, restart Eliquis following LP

## 2022-09-23 NOTE — BH CONSULTATION LIAISON ASSESSMENT NOTE - NSBHCHARTREVIEWVS_PSY_A_CORE FT
Vital Signs Last 24 Hrs  T(C): 36.3 (23 Sep 2022 05:44), Max: 36.5 (22 Sep 2022 21:31)  T(F): 97.3 (23 Sep 2022 05:44), Max: 97.7 (22 Sep 2022 21:31)  HR: 81 (23 Sep 2022 05:44) (75 - 95)  BP: 147/86 (23 Sep 2022 05:44) (137/83 - 147/86)  BP(mean): --  RR: 18 (23 Sep 2022 05:44) (18 - 18)  SpO2: 97% (23 Sep 2022 05:44) (97% - 98%)    Parameters below as of 23 Sep 2022 05:44  Patient On (Oxygen Delivery Method): room air

## 2022-09-23 NOTE — DISCHARGE NOTE PROVIDER - CARE PROVIDER_API CALL
Jason Cordova (DO)  Internal Medicine; Medical Oncology  [address] [place] [address]  Phone: [phone]  Fax: [phone]  Established Patient  Follow Up Time: 2 weeks

## 2022-09-23 NOTE — PROGRESS NOTE ADULT - ASSESSMENT
Encephalopathy  Anemia  DLBCL  Follicular lymphoma  HTN  CKD stage II  Complete heart block s/p PPM  Heart failure  Prior stroke (R parietotemporal)  Entero/rhinovirus infection    - Etiology likely toxic/metabolic in setting of acute medical infection with URI. No new focal neurologic deficits so cerebrovascular event much lower on differential. Needed to exclude seizure, especially given nidus of prior R parietotemporal infarct. Also needed to exclude metastatic brain disease but lower suspicion. Much lower suspicion for leptomeningeal process or infection but he is immunosuppressed so agree with further work-up for this  - vEEG with mild generalized slowing and no evidence for focal slowing, epileptiform discharges, or seizures  - MRI brain w/ and w/o shows chronic stroke but no new findings and no enhancement  - Agree with LP but due to patient agitation with need for sedation and currently on Eliquis would do this under IR to minimize time off Eliquis and assist in timing/compliance. Would send LP for cell count, total protein, glucose, Cx, PCR panel, cytology, flow cytometry, oligoclonal bands, myelin basic protein, IgG Index  - Continue to address above medical problems, as you are doing  - Will continue to follow patient with yo

## 2022-09-23 NOTE — DISCHARGE NOTE PROVIDER - NSDCCPCAREPLAN_GEN_ALL_CORE_FT
PRINCIPAL DISCHARGE DIAGNOSIS  Diagnosis: AMS (altered mental status)  Assessment and Plan of Treatment: You were admitted to the hospital after being found confused at the [facility]. An extensive workup for your confusion was performed to rule out any acute process that could be contributing to this change in mental status. MRI imaging of your head, as well as a video electroencephalogram study showed no acute abnormalities. To further evaluate your condition, a lumbar puncture for analysis of your cerebrospinal fluid was performed, which showed       PRINCIPAL DISCHARGE DIAGNOSIS  Diagnosis: AMS (altered mental status)  Assessment and Plan of Treatment: You were admitted to the hospital after being found confused at the [facility]. An extensive workup for your confusion was performed to rule out any acute process that could be contributing to this change in mental status. MRI imaging of your head, as well as a video electroencephalogram study showed no acute abnormalities. To further evaluate your condition, a lumbar puncture for analysis of your cerebrospinal fluid was performed, which showed      SECONDARY DISCHARGE DIAGNOSES  Diagnosis: Follicular lymphoma  Assessment and Plan of Treatment: You have a history of diffuse large B cell lymphoma and recently diagnosed with grade 3 follicular lymphoma     PRINCIPAL DISCHARGE DIAGNOSIS  Diagnosis: AMS (altered mental status)  Assessment and Plan of Treatment: You were admitted to the hospital after being found confused at the [facility]. An extensive workup for your confusion was performed to rule out any acute process that could be contributing to this change in mental status. MRI imaging of your head, as well as a video electroencephalogram study showed no acute abnormalities. To further evaluate your condition, a lumbar puncture for analysis of your cerebrospinal fluid was performed, which showed scattered intermediate sized lyumphoid cells.      SECONDARY DISCHARGE DIAGNOSES  Diagnosis: Follicular lymphoma  Assessment and Plan of Treatment: You have a history of diffuse large B cell lymphoma and recently diagnosed with grade 3 follicular lymphoma. Follicular lymphoma is one type of lymphoma. Lymphoma is a cancer of the lymphatic system. When people have follicular lymphoma, their white blood cells become abnormal and grow out of control. These cells can travel to different parts of the body.   Other than having swollen lymph nodes, some people do not have any other symptoms for years.   Oncology was consulted and you received one treatment with obinutuzumab on 9/2 and a second dose on 9/9/22. You were due for your third dose but it was put on hold in the setting of your altered mental status. Due to your lumbar puncture showing no significant findings at this time, oncology decided to continue with administering your chemo treatment and a PICC line was placed on 9/29 for chemo treatment on 9/30. Please take the oral chemo treatment for Saturday and Sunday after discharge, and then you will have a port placed outpatient and start Neulasta on Monday 10/3. Please continue taking your prednisone for the 5 days course in the setting of receiving chemotherapy.  Please continue to follow up outpatient with oncology for continued chemotherapy treatment.     PRINCIPAL DISCHARGE DIAGNOSIS  Diagnosis: AMS (altered mental status)  Assessment and Plan of Treatment: You were admitted to the hospital after being found confused at the [facility]. An extensive workup for your confusion was performed to rule out any acute process that could be contributing to this change in mental status. MRI imaging of your head, as well as a video electroencephalogram study showed no acute abnormalities. To further evaluate your condition, a lumbar puncture for analysis of your cerebrospinal fluid was performed, which showed scattered intermediate sized lyumphoid cells.      SECONDARY DISCHARGE DIAGNOSES  Diagnosis: Follicular lymphoma  Assessment and Plan of Treatment: You have a history of diffuse large B cell lymphoma and recently diagnosed with grade 3 follicular lymphoma. Follicular lymphoma is one type of lymphoma. Lymphoma is a cancer of the lymphatic system. When people have follicular lymphoma, their white blood cells become abnormal and grow out of control. These cells can travel to different parts of the body.   Other than having swollen lymph nodes, some people do not have any other symptoms for years.   Oncology was consulted and you received one treatment with obinutuzumab on 9/2 and a second dose on 9/9/22. You were due for your third dose but it was put on hold in the setting of your altered mental status. Due to your lumbar puncture showing no significant findings at this time, oncology decided to continue with administering your chemo treatment and a PICC line was placed on 9/29 for chemo treatment on 9/30. Please continue taking your prednisone for one more day to finish the 5 days course in the setting of receiving chemotherapy.  Please continue to follow up outpatient with oncology for continued treatment.     PRINCIPAL DISCHARGE DIAGNOSIS  Diagnosis: AMS (altered mental status)  Assessment and Plan of Treatment: You were admitted to the hospital after being found confused at the [facility]. An extensive workup for your confusion was performed to rule out any acute process that could be contributing to this change in mental status. MRI imaging of your head, as well as a video electroencephalogram study showed no acute abnormalities. To further evaluate your condition, a lumbar puncture for analysis of your cerebrospinal fluid wa performed.   Please make sure to follow-up with your primary care doctor 1-2 weeks after being discharged from the hospital for continued medical care.      SECONDARY DISCHARGE DIAGNOSES  Diagnosis: Follicular lymphoma  Assessment and Plan of Treatment: You have a history of diffuse large B cell lymphoma and recently diagnosed with grade 3 follicular lymphoma. Follicular lymphoma is one type of lymphoma. Lymphoma is a cancer of the lymphatic system. When people have follicular lymphoma, their white blood cells become abnormal and grow out of control. These cells can travel to different parts of the body.   Other than having swollen lymph nodes, some people do not have any other symptoms for years.   Oncology was consulted and you received one treatment with obinutuzumab on 9/2 and a second dose on 9/9/22. You were due for your third dose but it was put on hold in the setting of your altered mental status. Due to your lumbar puncture showing no significant findings at this time, oncology decided to continue with administering your chemo treatment and a PICC line was placed on 9/29 for chemo treatment on 9/30 and into the weeknd. Please make sure to tkae your Prednisone for one more day after being discharged from the hospital.   Please make sure to follow up with your Oncologist within 1 week for placement of your mediport. Please continue to follow-up with your oncologist for continued medical care.   Please also make sure to follow-up with your primary care for continued medical care.

## 2022-09-23 NOTE — BH CONSULTATION LIAISON ASSESSMENT NOTE - NSICDXPASTMEDICALHX_GEN_ALL_CORE_FT
PAST MEDICAL HISTORY:  Atrial flutter, unspecified type     DM type 2 (diabetes mellitus, type 2) Never on insulin    Essential hypertension     Impaired memory     Moderate mitral regurgitation     Non-Hodgkins Lymphoma In [facility] for > 10 years now with relapse    Pleural effusion     Rhinitis, allergic     Systolic heart failure

## 2022-09-23 NOTE — PROGRESS NOTE ADULT - PROBLEM SELECTOR PLAN 5
- Appears overall euvolemic on exam   - Pro-BNP 71550 <- [id number] (from last admission), troponin 41   - HFrEF with TTE from 09/2022 with EF 30%, severe global left ventricular systolic dysfunction. Mild RV enlargement with decreased RV systolic function    Plan:   - Continue home medications with hold parameters   - Metoprolol succinate 100 mg PO QD  - Entresto 49/51 PO BID with hold parameters   - spironolactone 25 mg PO QD

## 2022-09-23 NOTE — PROGRESS NOTE ADULT - ATTENDING COMMENTS
72 year old male with Afib (on eliquis), HTN,  complete heart block s/p PPM, HLD, HFrEF (30% in 09/2022), and DLBCL (tx with [facility] in 2003, with relapse in 2009 treated) now with recently diagnosed grade 3 follicular lymphoma who was transferred from [facility] for AMS. He presented for treatment at [facility] and  was later found wandering in the parking lot confused. Per daughter, in the morning of admisson, patient was somewhat agitated and slightly confused in AM and was reoriented.    In the ED, patient afebrile, HR 82, /74, RR 16 (96% on room air). CTH without acute findings, X-ray with mildly increased right loculated effusion. RVP + for rhino/enterovirus.     # Enterovirus pneumonia       CW supportive care  / Antitussive meds and DUoneb as needed       Monitor SPO2       CT of chest with no evidence of consolidations   # Acute on CHronic Encephalopathy      Exacerbated by current viral  infection      Patient remains pleasantly confused and noted that patient was recently DC from the hosp on 9/12 at that hospitalization , he was confused then      Neuro has seen patient and they recommend to repeat MRI which was done with no acute finding and to f/up paraneoplastic and autoimmune Ab.     EEG is pend      Awaiting on LP to be done   #DLBCL     appreciate Hem/onc rec---> No new MRI of brain ( chronic posterior parietal lobe ) no menigeal or white matter enhancement and LP is planned for 9/26 ---> so CHADS2 vasc score is high ( 4) and HAS-BLED is 2 so will place pt on Heparin drip while awaiting on LP , increase risk of bleed )    wife is updated     DAUGHTER WANTS TO FIND OUT FROM ONC IF PT CAN HAVE CHEMO SOON AND to be evaluated for METAPORT while in the hosp.  I spoke with patient's wife at the bedside and I called and spoken with Vonnie ( [phone] and she was at the bedside on 9/20       Leonie Mckayuerre   Hospitalist   [phone] /TEAMS 72 year old male with Afib (on eliquis), HTN,  complete heart block s/p PPM, HLD, HFrEF (30% in 09/2022), and DLBCL (tx with [facility] in 2003, with relapse in 2009 treated) now with recently diagnosed grade 3 follicular lymphoma who was transferred from [facility] for AMS. He presented for [place] at [facility] and  was later found wandering in the parking lot confused. Per daughter, in the morning of admisson, patient was somewhat agitated and slightly confused in AM and was reoriented.    In the ED, patient afebrile, HR 82, /74, RR 16 (96% on room air). CTH without acute findings, X-ray with mildly increased right loculated effusion. RVP + for rhino/enterovirus.      # Acute on CHronic Encephalopathy  ( Pt is S/P obinutuzumab)      Exacerbated by current viral  infection      Patient remains pleasantly confused and noted that patient was recently DC from the hosp on 9/12 at that hospitalization , he was confused then      Neuro has seen patient and they recommend to repeat MRI which was done with no acute finding and to f/up paraneoplastic and autoimmune Ab.     EEG is done with generalized slowing and no seizure activities of epileptiform activities      Awaiting on LP to be done , most likely on 9/26/22 ( to r/o meningeal involvement from the lymphoma Vs PML or meds side effecs   # Enterovirus pneumonia       CW supportive care  / Antitussive meds and DUoneb as needed       Monitor SPO2       CT of chest with no evidence of consolidations   #DLBCL     appreciate Hem/onc rec---> No new MRI of brain ( chronic posterior parietal lobe ) no menigeal or white matter enhancement ,   mild periventricular enhancement ( suggest microvascular changes) and LP is planned for 9/26 ---> so CHADS2 vasc score is high ( 4) and HAS-BLED   is 2 so will place pt on Heparin drip while awaiting on LP , increase risk of bleed )     MOnitor alk phosphatase    daughter is updated on 9/23      Daughter is Vonnie ( [phone]       Leonie Englandre   Hospitalist   [phone] /TEAMS

## 2022-09-23 NOTE — DISCHARGE NOTE PROVIDER - NSDCFUSCHEDAPPT_GEN_ALL_CORE_FT
[facility]  ELECTROPH [address]  Scheduled Appointment: 10/13/2022    [facility]  [address]  Scheduled Appointment: 10/14/2022    Amari Hickman  [facility]  Med GenInt [address] [facility]  Scheduled Appointment: 10/26/2022     [facility]  [address]  Scheduled Appointment: 10/03/2022    [facility]  ELECTROPH [address]  Scheduled Appointment: 10/13/2022    [facility]  [address] [facility]  Scheduled Appointment: 10/14/2022    Amari Hickman  [facility]  Med GenInt [address] [facility]  Scheduled Appointment: 10/26/2022     [facility]  ELECTROPH [address]  Scheduled Appointment: 10/13/2022    [facility]  [address]  Scheduled Appointment: 10/14/2022    Amari Hickman  [facility]  Med GenInt [address] [facility]  Scheduled Appointment: 10/26/2022     [facility]  [address]  Scheduled Appointment: 10/03/2022    [facility]  ELECTROPH [address]  Scheduled Appointment: 10/13/2022    [facility]  [address] [facility]  Scheduled Appointment: 10/14/2022    Amari Hickman  [facility]  Med GenInt [address] [facility]  Scheduled Appointment: 10/26/2022

## 2022-09-23 NOTE — DISCHARGE NOTE PROVIDER - DETAILS OF MALNUTRITION DIAGNOSIS/DIAGNOSES
This patient has been assessed with a concern for Malnutrition and was treated during this hospitalization for the following Nutrition diagnosis/diagnoses:     -  09/28/2022: Severe protein-calorie malnutrition

## 2022-09-23 NOTE — BH CONSULTATION LIAISON ASSESSMENT NOTE - NSBHCONSULTRECOMMENDOTHER_PSY_A_CORE FT
1. Start Melatonin 3mg PO qHS PRN insomnia.    2. PRN: Depakote 250mg PO/IV q8h PRN agitation.  Ativan 0.5mg-1mg PO/IV q6h PRN breakthrough agitation, (Caution judicious use as may worsen delirium)    3. Minimize use of benzos, opioids, anticholinergics, or other deliriogenic agents when possible.  Maintain sleep wake cycle.  Provide frequent reorientation and redirection.  Family member at bedside if possible. Assess for need for glasses and hearing aid (if applicable).    4. Pt does not meet criteria for psychiatric hospitalization.  Recommend outpt psych f/u at [facility] after d/c- [phone].   [facility] will follow.

## 2022-09-23 NOTE — PROGRESS NOTE ADULT - SUBJECTIVE AND OBJECTIVE BOX
GRIMM, RICHARD  72y  Male      Patient is a 72y old  Male who presents with a chief complaint of Confusion (22 Sep 2022 07:30)      INTERVAL HPI/OVERNIGHT EVENTS:  Provider handoff from yesterday - 9/22: MRI chronic infarct; vEEG pending; daughter updated; neuro requesting vEEG before LP; vEEG placed    Overnight, A&Ox1 & demonstrating poor insight & judgment, did not have capacity to leave AMA. Stated that he had private family business to attend to emergently and that he planned to return to the hospital after 2 days. Unable to articulate why he had been hospitalized or what the risks of leaving the hospital at this time might be.   Patient given 0.5mg ativan and improved as per documentation of covering physician         T(C): 36.3 (09-23-22 @ 05:44), Max: 36.5 (09-22-22 @ 21:31)  HR: 81 (09-23-22 @ 05:44) (75 - 95)  BP: 147/86 (09-23-22 @ 05:44) (137/83 - 147/86)  RR: 18 (09-23-22 @ 05:44) (18 - 18)  SpO2: 97% (09-23-22 @ 05:44) (97% - 98%)  Wt(kg): --Vital Signs Last 24 Hrs  T(C): 36.3 (23 Sep 2022 05:44), Max: 36.5 (22 Sep 2022 21:31)  T(F): 97.3 (23 Sep 2022 05:44), Max: 97.7 (22 Sep 2022 21:31)  HR: 81 (23 Sep 2022 05:44) (75 - 95)  BP: 147/86 (23 Sep 2022 05:44) (137/83 - 147/86)  BP(mean): --  RR: 18 (23 Sep 2022 05:44) (18 - 18)  SpO2: 97% (23 Sep 2022 05:44) (97% - 98%)    PHYSICAL EXAM:  GENERAL: Alert.  No acute distress. Appears confused   HEAD:  Atraumatic. Normocephalic.  EYES: EOMI. PERRLA. Normal conjunctiva/sclera.  ENT: No JVD. Moist oral mucosa.    CARDIAC: Regular rate and rhythm. Not irregularly irregular. S1. S2. No murmur.    LUNG/CHEST: Good bilateral air entry  ABDOMEN: Soft. No tenderness. No distension.  Normal bowel sounds.  EXTREMITIES:  No clubbing. No cyanosis. No edema. Moving all 4.  NEUROLOGY: A&Ox3 this AM     Consultant(s) Notes Reviewed:  [x ] YES  [ ] NO  Care Discussed with Consultants/Other Providers [ x] YES  [ ] NO    LABS:      RADIOLOGY & ADDITIONAL TESTS:    Imaging Personally Reviewed:  [ ] YES  [ ] NO  acyclovir   Oral Tab/Cap 400 milliGRAM(s) Oral two times a day  albuterol/ipratropium for Nebulization 3 milliLiter(s) Nebulizer every 6 hours  allopurinol 100 milliGRAM(s) Oral daily  apixaban 5 milliGRAM(s) Oral every 12 hours  atorvastatin 40 milliGRAM(s) Oral at bedtime  guaiFENesin Oral Liquid (Sugar-Free) 100 milliGRAM(s) Oral every 6 hours PRN  melatonin 3 milliGRAM(s) Oral at bedtime  metoprolol succinate  milliGRAM(s) Oral daily  multivitamin 1 Tablet(s) Oral daily  sacubitril 49 mG/valsartan 51 mG 1 Tablet(s) Oral two times a day  senna 2 Tablet(s) Oral at bedtime PRN  spironolactone 25 milliGRAM(s) Oral <User Schedule>      HEALTH ISSUES - PROBLEM Dx:  Altered mental status    Positive D dimer    High alkaline phosphatase    Follicular lymphoma    Chronic systolic congestive heart failure    Atrial fibrillation    Prophylactic measure    DM type 2 (diabetes mellitus, type 2)  Never on insulin    Loculated pleural effusion    Chronic atrial fibrillation             GRIMM, RICHARD  72y  Male      Patient is a 72y old  Male who presents with a chief complaint of Confusion (22 Sep 2022 07:30)      INTERVAL HPI/OVERNIGHT EVENTS:  Provider handoff from yesterday - 9/22: MRI chronic infarct; vEEG pending; daughter updated; neuro requesting vEEG before LP; vEEG placed    Overnight, A&Ox1 & demonstrating poor insight & judgment, did not have capacity to leave AMA. Stated that he had private family business to attend to emergently and that he planned to return to the hospital after 2 days. Unable to articulate why he had been hospitalized or what the risks of leaving the hospital at this time might be.   Patient given 0.5mg ativan and improved as per documentation of covering physician         T(C): 36.3 (09-23-22 @ 05:44), Max: 36.5 (09-22-22 @ 21:31)  HR: 81 (09-23-22 @ 05:44) (75 - 95)  BP: 147/86 (09-23-22 @ 05:44) (137/83 - 147/86)  RR: 18 (09-23-22 @ 05:44) (18 - 18)  SpO2: 97% (09-23-22 @ 05:44) (97% - 98%)  Wt(kg): --Vital Signs Last 24 Hrs  T(C): 36.3 (23 Sep 2022 05:44), Max: 36.5 (22 Sep 2022 21:31)  T(F): 97.3 (23 Sep 2022 05:44), Max: 97.7 (22 Sep 2022 21:31)  HR: 81 (23 Sep 2022 05:44) (75 - 95)  BP: 147/86 (23 Sep 2022 05:44) (137/83 - 147/86)  BP(mean): --  RR: 18 (23 Sep 2022 05:44) (18 - 18)  SpO2: 97% (23 Sep 2022 05:44) (97% - 98%)    PHYSICAL EXAM:  GENERAL: Alert.  No acute distress. Appears confused   HEAD:  Atraumatic. Normocephalic.  EYES: EOMI. PERRLA. Normal conjunctiva/sclera.  ENT: No JVD. Moist oral mucosa.    CARDIAC: Regular rate and rhythm. Not irregularly irregular. S1. S2. No murmur.    LUNG/CHEST: Good bilateral air entry  ABDOMEN: Soft. No tenderness. No distension.  Normal bowel sounds.  EXTREMITIES:  No clubbing. No cyanosis. No edema. Moving all 4.  NEUROLOGY: A&Ox3 this AM     Consultant(s) Notes Reviewed:  [x ] YES  [ ] NO  Care Discussed with Consultants/Other Providers [ x] YES  [ ] NO    LABS:      RADIOLOGY & ADDITIONAL TESTS:  ACC: [id number] EXAM:  MR BRAIN WAW IC                          PROCEDURE DATE:  09/21/2022          INTERPRETATION:  MR BRAIN WITHOUT AND WITH IV CONTRAST    Clinical information: AMS, encephalopathy    A MRI of the brain was performed both prior toand after the   administration of intravenous gadolinium.    TECHNIQUE:  Precontrast imaging includes sagittal and axial T1, coronal and axial T2,   axial FLAIR, axial GRE, and axial diffusion weighted imaging.    Postcontrast imaging includes axial T1 and volumetric T1 weighted imaging.  Contrast administered 7.5 cc Gadavist. Contrast discarded 0 cc.    COMPARISON:  MRI/MRA brain dated 9/6/2022. CT head dated 8/31/2022    FINDINGS:  *  BRAIN PARENCHYMA:  Territorial T2 FLAIR signal increase in the right posterior parietal lobe   about the region of the right MCA/PCA watershed WITHOUT associated   diffusion abnormality nor mass effect. Encephalomalacia/cystic glial   change in the periphery of the infarct consistent with prior liquefactive   necrosis and CSF cleft formation (series 6-15). Suggest Mild   periventricular and scattered subcortical white matter foci that are T2   hyperintense and likely representative of chronic microvascular changes.   No cricket blood nor gradient echo blooming artifact to suggest acute nor   chronic hemorrhage. No abnormal enhancing parenchymal structures.    *  VENTRICLES:  No hydrocephalus.  No midline shift.    *  ENHANCEMENT:  No evidence of abnormal parenchymal or dural enhancement.    *  EXTRA-AXIAL:  No subdural or epidural collection.  No extra-axial mass.  Basal cisterns   preserved.      IMPRESSION:    Chronic right posterior parietal stroke without significant interval   change. No diffusion nor gradient echo abnormality to suggest acute on   chronic or hemorrhagic component.    No new areas concerning for acute transcortical infarction, hemorrhage,   nor mass. No abnormal focal enhancement.    --- End of Report ---        Imaging Personally Reviewed:  [ ] YES  [ ] NO  acyclovir   Oral Tab/Cap 400 milliGRAM(s) Oral two times a day  albuterol/ipratropium for Nebulization 3 milliLiter(s) Nebulizer every 6 hours  allopurinol 100 milliGRAM(s) Oral daily  apixaban 5 milliGRAM(s) Oral every 12 hours  atorvastatin 40 milliGRAM(s) Oral at bedtime  guaiFENesin Oral Liquid (Sugar-Free) 100 milliGRAM(s) Oral every 6 hours PRN  melatonin 3 milliGRAM(s) Oral at bedtime  metoprolol succinate  milliGRAM(s) Oral daily  multivitamin 1 Tablet(s) Oral daily  sacubitril 49 mG/valsartan 51 mG 1 Tablet(s) Oral two times a day  senna 2 Tablet(s) Oral at bedtime PRN  spironolactone 25 milliGRAM(s) Oral <User Schedule>      HEALTH ISSUES - PROBLEM Dx:  Altered mental status    Positive D dimer    High alkaline phosphatase    Follicular lymphoma    Chronic systolic congestive heart failure    Atrial fibrillation    Prophylactic measure    DM type 2 (diabetes mellitus, type 2)  Never on insulin    Loculated pleural effusion    Chronic atrial fibrillation             GRIMM, RICHARD  72y  Male      Patient is a 72y old  Male who presents with a chief complaint of Confusion (22 Sep 2022 07:30)      INTERVAL HPI/OVERNIGHT EVENTS:  Provider handoff from yesterday - 9/22: MRI chronic infarct; vEEG pending; daughter updated; neuro requesting vEEG before LP; vEEG placed    Overnight, A&Ox1 & demonstrating poor insight & judgment, did not have capacity to leave AMA. Stated that he had private family business to attend to emergently and that he planned to return to the hospital after 2 days. Unable to articulate why he had been hospitalized or what the risks of leaving the hospital at this time might be.   Patient given 0.5mg ativan and improved as per documentation of covering physician     This AM patient tried leaving again but was able to redirected.     Read of eeg is pending; onc then requesting neuro pursue LP for further study.     T(C): 36.3 (09-23-22 @ 05:44), Max: 36.5 (09-22-22 @ 21:31)  HR: 81 (09-23-22 @ 05:44) (75 - 95)  BP: 147/86 (09-23-22 @ 05:44) (137/83 - 147/86)  RR: 18 (09-23-22 @ 05:44) (18 - 18)  SpO2: 97% (09-23-22 @ 05:44) (97% - 98%)  Wt(kg): --Vital Signs Last 24 Hrs  T(C): 36.3 (23 Sep 2022 05:44), Max: 36.5 (22 Sep 2022 21:31)  T(F): 97.3 (23 Sep 2022 05:44), Max: 97.7 (22 Sep 2022 21:31)  HR: 81 (23 Sep 2022 05:44) (75 - 95)  BP: 147/86 (23 Sep 2022 05:44) (137/83 - 147/86)  BP(mean): --  RR: 18 (23 Sep 2022 05:44) (18 - 18)  SpO2: 97% (23 Sep 2022 05:44) (97% - 98%)    PHYSICAL EXAM:  GENERAL: Alert.  No acute distress. Appears confused   HEAD:  Atraumatic. Normocephalic.  EYES: EOMI. PERRLA. Normal conjunctiva/sclera.  ENT: No JVD. Moist oral mucosa.    CARDIAC: Regular rate and rhythm. Not irregularly irregular. S1. S2. No murmur.    LUNG/CHEST: Good bilateral air entry  ABDOMEN: Soft. No tenderness. No distension.  Normal bowel sounds.  EXTREMITIES:  No clubbing. No cyanosis. No edema. Moving all 4.  NEUROLOGY: A&Ox3 this AM     Consultant(s) Notes Reviewed:  [x ] YES  [ ] NO  Care Discussed with Consultants/Other Providers [ x] YES  [ ] NO    LABS:                          9.6    3.58  )-----------( 246      ( 23 Sep 2022 07:31 )             33.2       09-23    141  |  107  |  22  ----------------------------<  101<H>  4.0   |  22  |  1.09    Ca    9.2      23 Sep 2022 07:31  Phos  3.1     09-23  Mg     1.7     09-23    TPro  7.0  /  Alb  3.5  /  TBili  0.7  /  DBili  x   /  AST  20  /  ALT  26  /  AlkPhos  278<H>  09-23                RADIOLOGY & ADDITIONAL TESTS:  ACC: [id number] EXAM:  MR BRAIN WAW IC                          PROCEDURE DATE:  09/21/2022          INTERPRETATION:  MR BRAIN WITHOUT AND WITH IV CONTRAST    Clinical information: AMS, encephalopathy    A MRI of the brain was performed both prior toand after the   administration of intravenous gadolinium.    TECHNIQUE:  Precontrast imaging includes sagittal and axial T1, coronal and axial T2,   axial FLAIR, axial GRE, and axial diffusion weighted imaging.    Postcontrast imaging includes axial T1 and volumetric T1 weighted imaging.  Contrast administered 7.5 cc Gadavist. Contrast discarded 0 cc.    COMPARISON:  MRI/MRA brain dated 9/6/2022. CT head dated 8/31/2022    FINDINGS:  *  BRAIN PARENCHYMA:  Territorial T2 FLAIR signal increase in the right posterior parietal lobe   about the region of the right MCA/PCA watershed WITHOUT associated   diffusion abnormality nor mass effect. Encephalomalacia/cystic glial   change in the periphery of the infarct consistent with prior liquefactive   necrosis and CSF cleft formation (series 6-15). Suggest Mild   periventricular and scattered subcortical white matter foci that are T2   hyperintense and likely representative of chronic microvascular changes.   No cricket blood nor gradient echo blooming artifact to suggest acute nor   chronic hemorrhage. No abnormal enhancing parenchymal structures.    *  VENTRICLES:  No hydrocephalus.  No midline shift.    *  ENHANCEMENT:  No evidence of abnormal parenchymal or dural enhancement.    *  EXTRA-AXIAL:  No subdural or epidural collection.  No extra-axial mass.  Basal cisterns   preserved.      IMPRESSION:    Chronic right posterior parietal stroke without significant interval   change. No diffusion nor gradient echo abnormality to suggest acute on   chronic or hemorrhagic component.    No new areas concerning for acute transcortical infarction, hemorrhage,   nor mass. No abnormal focal enhancement.    --- End of Report ---        Imaging Personally Reviewed:  [ ] YES  [ ] NO  acyclovir   Oral Tab/Cap 400 milliGRAM(s) Oral two times a day  albuterol/ipratropium for Nebulization 3 milliLiter(s) Nebulizer every 6 hours  allopurinol 100 milliGRAM(s) Oral daily  apixaban 5 milliGRAM(s) Oral every 12 hours  atorvastatin 40 milliGRAM(s) Oral at bedtime  guaiFENesin Oral Liquid (Sugar-Free) 100 milliGRAM(s) Oral every 6 hours PRN  melatonin 3 milliGRAM(s) Oral at bedtime  metoprolol succinate  milliGRAM(s) Oral daily  multivitamin 1 Tablet(s) Oral daily  sacubitril 49 mG/valsartan 51 mG 1 Tablet(s) Oral two times a day  senna 2 Tablet(s) Oral at bedtime PRN  spironolactone 25 milliGRAM(s) Oral <User Schedule>      HEALTH ISSUES - PROBLEM Dx:  Altered mental status    Positive D dimer    High alkaline phosphatase    Follicular lymphoma    Chronic systolic congestive heart failure    Atrial fibrillation    Prophylactic measure    DM type 2 (diabetes mellitus, type 2)  Never on insulin    Loculated pleural effusion    Chronic atrial fibrillation             GRIMM, RICHARD  72y  Male      Patient is a 72y old  Male who presents with a chief complaint of Confusion (22 Sep 2022 07:30)      INTERVAL HPI/OVERNIGHT EVENTS:  Provider handoff from yesterday - 9/22: MRI chronic infarct; vEEG pending; daughter updated; neuro requesting vEEG before LP; vEEG placed    Overnight, A&Ox1 & demonstrating poor insight & judgment, did not have capacity to leave AMA. Stated that he had private family business to attend to emergently and that he planned to return to the hospital after 2 days. Unable to articulate why he had been hospitalized or what the risks of leaving the hospital at this time might be.   Patient given 0.5mg ativan and improved as per documentation of covering physician     This AM patient tried leaving again but was able to redirected.     Read of eeg is pending; onc then requesting neuro pursue LP for further study.     T(C): 36.3 (09-23-22 @ 05:44), Max: 36.5 (09-22-22 @ 21:31)  HR: 81 (09-23-22 @ 05:44) (75 - 95)  BP: 147/86 (09-23-22 @ 05:44) (137/83 - 147/86)  RR: 18 (09-23-22 @ 05:44) (18 - 18)  SpO2: 97% (09-23-22 @ 05:44) (97% - 98%)  Wt(kg): --Vital Signs Last 24 Hrs  T(C): 36.3 (23 Sep 2022 05:44), Max: 36.5 (22 Sep 2022 21:31)  T(F): 97.3 (23 Sep 2022 05:44), Max: 97.7 (22 Sep 2022 21:31)  HR: 81 (23 Sep 2022 05:44) (75 - 95)  BP: 147/86 (23 Sep 2022 05:44) (137/83 - 147/86)  BP(mean): --  RR: 18 (23 Sep 2022 05:44) (18 - 18)  SpO2: 97% (23 Sep 2022 05:44) (97% - 98%)    PHYSICAL EXAM:  GENERAL: Alert.  No acute distress. Appears confused   HEAD:  Atraumatic. Normocephalic.  EYES: EOMI. PERRLA. Normal conjunctiva/sclera.  ENT: No JVD. Moist oral mucosa.    CARDIAC: Regular rate and rhythm. Not irregularly irregular. S1. S2. No murmur.    LUNG/CHEST: Good bilateral air entry  ABDOMEN: Soft. No tenderness. No distension.  Normal bowel sounds.  EXTREMITIES:  No clubbing. No cyanosis. No edema. Moving all 4.  NEUROLOGY: AO 1-2 this AM, increasingly confused     Consultant(s) Notes Reviewed:  [x ] YES  [ ] NO  Care Discussed with Consultants/Other Providers [ x] YES  [ ] NO    LABS:                          9.6    3.58  )-----------( 246      ( 23 Sep 2022 07:31 )             33.2       09-23    141  |  107  |  22  ----------------------------<  101<H>  4.0   |  22  |  1.09    Ca    9.2      23 Sep 2022 07:31  Phos  3.1     09-23  Mg     1.7     09-23    TPro  7.0  /  Alb  3.5  /  TBili  0.7  /  DBili  x   /  AST  20  /  ALT  26  /  AlkPhos  278<H>  09-23                RADIOLOGY & ADDITIONAL TESTS:  ACC: [id number] EXAM:  MR BRAIN WAW IC                          PROCEDURE DATE:  09/21/2022          INTERPRETATION:  MR BRAIN WITHOUT AND WITH IV CONTRAST    Clinical information: AMS, encephalopathy    A MRI of the brain was performed both prior toand after the   administration of intravenous gadolinium.    TECHNIQUE:  Precontrast imaging includes sagittal and axial T1, coronal and axial T2,   axial FLAIR, axial GRE, and axial diffusion weighted imaging.    Postcontrast imaging includes axial T1 and volumetric T1 weighted imaging.  Contrast administered 7.5 cc Gadavist. Contrast discarded 0 cc.    COMPARISON:  MRI/MRA brain dated 9/6/2022. CT head dated 8/31/2022    FINDINGS:  *  BRAIN PARENCHYMA:  Territorial T2 FLAIR signal increase in the right posterior parietal lobe   about the region of the right MCA/PCA watershed WITHOUT associated   diffusion abnormality nor mass effect. Encephalomalacia/cystic glial   change in the periphery of the infarct consistent with prior liquefactive   necrosis and CSF cleft formation (series 6-15). Suggest Mild   periventricular and scattered subcortical white matter foci that are T2   hyperintense and likely representative of chronic microvascular changes.   No cricket blood nor gradient echo blooming artifact to suggest acute nor   chronic hemorrhage. No abnormal enhancing parenchymal structures.    *  VENTRICLES:  No hydrocephalus.  No midline shift.    *  ENHANCEMENT:  No evidence of abnormal parenchymal or dural enhancement.    *  EXTRA-AXIAL:  No subdural or epidural collection.  No extra-axial mass.  Basal cisterns   preserved.      IMPRESSION:    Chronic right posterior parietal stroke without significant interval   change. No diffusion nor gradient echo abnormality to suggest acute on   chronic or hemorrhagic component.    No new areas concerning for acute transcortical infarction, hemorrhage,   nor mass. No abnormal focal enhancement.    --- End of Report ---        Imaging Personally Reviewed:  [ ] YES  [ ] NO  acyclovir   Oral Tab/Cap 400 milliGRAM(s) Oral two times a day  albuterol/ipratropium for Nebulization 3 milliLiter(s) Nebulizer every 6 hours  allopurinol 100 milliGRAM(s) Oral daily  apixaban 5 milliGRAM(s) Oral every 12 hours  atorvastatin 40 milliGRAM(s) Oral at bedtime  guaiFENesin Oral Liquid (Sugar-Free) 100 milliGRAM(s) Oral every 6 hours PRN  melatonin 3 milliGRAM(s) Oral at bedtime  metoprolol succinate  milliGRAM(s) Oral daily  multivitamin 1 Tablet(s) Oral daily  sacubitril 49 mG/valsartan 51 mG 1 Tablet(s) Oral two times a day  senna 2 Tablet(s) Oral at bedtime PRN  spironolactone 25 milliGRAM(s) Oral <User Schedule>      HEALTH ISSUES - PROBLEM Dx:  Altered mental status    Positive D dimer    High alkaline phosphatase    Follicular lymphoma    Chronic systolic congestive heart failure    Atrial fibrillation    Prophylactic measure    DM type 2 (diabetes mellitus, type 2)  Never on insulin    Loculated pleural effusion    Chronic atrial fibrillation             GRIMM, RICHARD  72y  Male      Patient is a 72y old  Male who presents with a chief complaint of Confusion (22 Sep 2022 07:30)      INTERVAL HPI/OVERNIGHT EVENTS:  Provider handoff from yesterday - 9/22: MRI chronic infarct; vEEG pending; daughter updated; neuro requesting vEEG before LP; vEEG placed    Overnight, A&Ox1 & demonstrating poor insight & judgment, did not have capacity to leave AMA. Stated that he had private family business to attend to emergently and that he planned to return to the hospital after 2 days. Unable to articulate why he had been hospitalized or what the risks of leaving the hospital at this time might be.   Patient given 0.5mg ativan and improved as per documentation of covering physician     This AM patient tried leaving again but was able to redirected.     Read of eeg is pending; onc then requesting neuro pursue LP for further study.     T(C): 36.3 (09-23-22 @ 05:44), Max: 36.5 (09-22-22 @ 21:31)  HR: 81 (09-23-22 @ 05:44) (75 - 95)  BP: 147/86 (09-23-22 @ 05:44) (137/83 - 147/86)  RR: 18 (09-23-22 @ 05:44) (18 - 18)  SpO2: 97% (09-23-22 @ 05:44) (97% - 98%)  Wt(kg): --Vital Signs Last 24 Hrs  T(C): 36.3 (23 Sep 2022 05:44), Max: 36.5 (22 Sep 2022 21:31)  T(F): 97.3 (23 Sep 2022 05:44), Max: 97.7 (22 Sep 2022 21:31)  HR: 81 (23 Sep 2022 05:44) (75 - 95)  BP: 147/86 (23 Sep 2022 05:44) (137/83 - 147/86)  BP(mean): --  RR: 18 (23 Sep 2022 05:44) (18 - 18)  SpO2: 97% (23 Sep 2022 05:44) (97% - 98%)    PHYSICAL EXAM:  GENERAL: Alert.  No acute distress. Appears confused   HEAD:  Atraumatic. Normocephalic.  EYES: EOMI. PERRLA. Normal conjunctiva/sclera.  ENT: No JVD. Moist oral mucosa.    CARDIAC: Regular rate and rhythm. Not irregularly irregular. S1. S2. No murmur.    LUNG/CHEST: Good bilateral air entry  ABDOMEN: Soft. No tenderness. No distension.  Normal bowel sounds.  EXTREMITIES:  No clubbing. No cyanosis. No edema. Moving all 4.  NEUROLOGY: AO 1-2 this AM, increasingly confused   SKIN  : Upper back with clean healing surgical site    Consultant(s) Notes Reviewed:  [x ] YES  [ ] NO  Care Discussed with Consultants/Other Providers [ x] YES  [ ] NO    LABS:                          9.6    3.58  )-----------( 246      ( 23 Sep 2022 07:31 )             33.2       09-23    141  |  107  |  22  ----------------------------<  101<H>  4.0   |  22  |  1.09    Ca    9.2      23 Sep 2022 07:31  Phos  3.1     09-23  Mg     1.7     09-23    TPro  7.0  /  Alb  3.5  /  TBili  0.7  /  DBili  x   /  AST  20  /  ALT  26  /  AlkPhos  278<H>  09-23                RADIOLOGY & ADDITIONAL TESTS:  ACC: [id number] EXAM:  MR BRAIN WAW IC                          PROCEDURE DATE:  09/21/2022          INTERPRETATION:  MR BRAIN WITHOUT AND WITH IV CONTRAST    Clinical information: AMS, encephalopathy    A MRI of the brain was performed both prior toand after the   administration of intravenous gadolinium.    TECHNIQUE:  Precontrast imaging includes sagittal and axial T1, coronal and axial T2,   axial FLAIR, axial GRE, and axial diffusion weighted imaging.    Postcontrast imaging includes axial T1 and volumetric T1 weighted imaging.  Contrast administered 7.5 cc Gadavist. Contrast discarded 0 cc.    COMPARISON:  MRI/MRA brain dated 9/6/2022. CT head dated 8/31/2022    FINDINGS:  *  BRAIN PARENCHYMA:  Territorial T2 FLAIR signal increase in the right posterior parietal lobe   about the region of the right MCA/PCA watershed WITHOUT associated   diffusion abnormality nor mass effect. Encephalomalacia/cystic glial   change in the periphery of the infarct consistent with prior liquefactive   necrosis and CSF cleft formation (series 6-15). Suggest Mild   periventricular and scattered subcortical white matter foci that are T2   hyperintense and likely representative of chronic microvascular changes.   No cricket blood nor gradient echo blooming artifact to suggest acute nor   chronic hemorrhage. No abnormal enhancing parenchymal structures.    *  VENTRICLES:  No hydrocephalus.  No midline shift.    *  ENHANCEMENT:  No evidence of abnormal parenchymal or dural enhancement.    *  EXTRA-AXIAL:  No subdural or epidural collection.  No extra-axial mass.  Basal cisterns   preserved.      IMPRESSION:    Chronic right posterior parietal stroke without significant interval   change. No diffusion nor gradient echo abnormality to suggest acute on   chronic or hemorrhagic component.    No new areas concerning for acute transcortical infarction, hemorrhage,   nor mass. No abnormal focal enhancement.    --- End of Report ---        Imaging Personally Reviewed:  [ ] YES  [ ] NO  acyclovir   Oral Tab/Cap 400 milliGRAM(s) Oral two times a day  albuterol/ipratropium for Nebulization 3 milliLiter(s) Nebulizer every 6 hours  allopurinol 100 milliGRAM(s) Oral daily  apixaban 5 milliGRAM(s) Oral every 12 hours  atorvastatin 40 milliGRAM(s) Oral at bedtime  guaiFENesin Oral Liquid (Sugar-Free) 100 milliGRAM(s) Oral every 6 hours PRN  melatonin 3 milliGRAM(s) Oral at bedtime  metoprolol succinate  milliGRAM(s) Oral daily  multivitamin 1 Tablet(s) Oral daily  sacubitril 49 mG/valsartan 51 mG 1 Tablet(s) Oral two times a day  senna 2 Tablet(s) Oral at bedtime PRN  spironolactone 25 milliGRAM(s) Oral <User Schedule>      HEALTH ISSUES - PROBLEM Dx:  Altered mental status    Positive D dimer    High alkaline phosphatase    Follicular lymphoma    Chronic systolic congestive heart failure    Atrial fibrillation    Prophylactic measure    DM type 2 (diabetes mellitus, type 2)  Never on insulin    Loculated pleural effusion    Chronic atrial fibrillation

## 2022-09-23 NOTE — CHART NOTE - NSCHARTNOTEFT_GEN_A_CORE
Notified at 2:00 am on 9/23 that pt wanted to leave the hospital. Assessed pt, he was seated on his bed, A&Ox1 & demonstrating poor insight & judgment, did not have capacity to leave AMA. Stated that he had private family business to attend to emergently and that he planned to return to the hospital after 2 days. Unable to articulate why he had been hospitalized or what the risks of leaving the hospital at this time might be. Of note, pt had similar episode on 9/19 where he attempted to leave the hospital. Pt administered Ativan at that time as QTc is prolonged (most recent QTc 521).    Attempted to call family members to update them and potentially help reorient the patient, however unable to reach.    Pt agreed to take 0.5 mg PO Ativan. Subsequently calmer, no longer attempting to leave hospital.     Boogie CHAO

## 2022-09-23 NOTE — BH CONSULTATION LIAISON ASSESSMENT NOTE - SUMMARY
72M , domiciled, retired [employer] worker, with no formal PPhx, no prior SA or psych admissions, no active substance abuse, with PMH significant for afib (on eliquis), HTN,  complete heart block s/p PPM, HLD, HFrEF (30% in 09/2022), and DLBCL (tx with R-CHOP in 2003, with relapse in 2009 treated with BR) now with recently diagnosed grade 3 follicular lymphoma who was transferred from [facility] for AMS likely in the setting of rhino/entero viral pneumonia v.s. CNS involving lymphoma with leptomeningeal disease, psychiatry consulted for management of agitation.  Pt AOx1 on interview, calm and cooperative, but staff notes pt has had some agitation in the evenings.  Presentation c/w delirium 2/2 GMC, r/o dementia.

## 2022-09-23 NOTE — BH CONSULTATION LIAISON ASSESSMENT NOTE - CURRENT MEDICATION
MEDICATIONS  (STANDING):  acyclovir   Oral Tab/Cap 400 milliGRAM(s) Oral two times a day  albuterol/ipratropium for Nebulization 3 milliLiter(s) Nebulizer every 6 hours  allopurinol 100 milliGRAM(s) Oral daily  apixaban 5 milliGRAM(s) Oral every 12 hours  atorvastatin 40 milliGRAM(s) Oral at bedtime  melatonin 3 milliGRAM(s) Oral at bedtime  metoprolol succinate  milliGRAM(s) Oral daily  multivitamin 1 Tablet(s) Oral daily  sacubitril 49 mG/valsartan 51 mG 1 Tablet(s) Oral two times a day  spironolactone 25 milliGRAM(s) Oral <User Schedule>    MEDICATIONS  (PRN):  guaiFENesin Oral Liquid (Sugar-Free) 100 milliGRAM(s) Oral every 6 hours PRN Cough  senna 2 Tablet(s) Oral at bedtime PRN Constipation

## 2022-09-23 NOTE — DISCHARGE NOTE PROVIDER - NSFOLLOWUPCLINICS_GEN_ALL_ED_FT
[facility]  Hematology/Oncology  [address] [place] [address]  Phone: [phone]  Fax:     [facility] Internal Medicine  General Internal Medicine  [address] [place] [address]  Phone: [phone]  Fax:      [facility] General Internal Medicine  General Internal Medicine  [address] [place] [address]  Phone: [phone]  Fax:     [facility]  Hematology/Oncology  [address] [place] [address]  Phone: [phone]  Fax:     [facility] Psychiatry  Psychiatry  [address] [place] [address]  Phone: [phone]  Fax:   Follow Up Time: 2 weeks     [facility]  Neurology Autoimmune Encephalitis  [address] [place] [address]  Phone: [phone]  Fax: [phone]  Follow Up Time: 2 weeks    [facility]  Hematology/Oncology  [address] [place] [address]  Phone: [phone]  Fax:   Scheduled Appointment: 10/3/2022 2:00 AM    [facility] Psych[facility]  Psychiatry  [address] [place] [address]  Phone: [phone]  Fax:   Follow Up Time: 2 weeks

## 2022-09-23 NOTE — CHART NOTE - NSCHARTNOTEFT_GEN_A_CORE
please consult neuro radiologist at 1533 to schedule LP.    thank you.    Sadaf Jalili, IR PA-C, available on TEAMS or IR callback [phone]

## 2022-09-23 NOTE — DISCHARGE NOTE PROVIDER - NSDCCAREPROVSEEN_GEN_ALL_CORE_FT
[facility] Team 4 Usama Evans, Leonie Mancera [facility] Team 4 Leonie Quintero

## 2022-09-23 NOTE — DISCHARGE NOTE PROVIDER - NSDCFUADDAPPT_GEN_ALL_CORE_FT
APPTS ARE READY TO BE MADE: [X] YES    Best Family or Patient Contact (if needed):  Vonnie Skaggs (Daughter): [phone]    Additional Information about above appointments (if needed):    1: Please follow up with Oncology at [facility] on 10/3 at 2:00PM for your Neulasta appointment.   2: Please follow up with [facility] Geriatric Outpatient Psychiatric Department within 2 weeks of being discharged for outpatient Psychiatry follow up.  3:     Other comments or requests:    APPTS ARE READY TO BE MADE: [X] YES    Best Family or Patient Contact (if needed):  Vonnie Skaggs (Daughter): [phone]    Additional Information about above appointments (if needed):    1: Please follow up with Oncology at [facility] on 10/3 at 2:00PM for your Neulasta appointment.   2: Please follow up with [facility] Geriatric Outpatient Psychiatric Department within 2 weeks of being discharged for outpatient Psychiatry follow up.  3: Please follow up with Neurology within 2 weeks of being discharged for further outpatient treatment as needed.  4: Please follow up with Dr. Hickman on 10/26 to manage your primary care needs after discharge.   5: Please follow up with Electrophysiology on 10/13 for further management of your pacemaker.     Other comments or requests:    APPTS ARE READY TO BE MADE: [X] YES    Best Family or Patient Contact (if needed):  Vonnie Skaggs (Daughter): [phone]    Additional Information about above appointments (if needed):    1: Please follow up with Oncology at [facility] on  at  for your Neulasta appointment.   2: Please follow up with [facility] Geriatric Outpatient Psychiatric Department within 2 weeks of being discharged for outpatient Psychiatry follow up.  3: Please follow up with Neurology within 2 weeks of being discharged for further outpatient treatment as needed.  4: Please follow up with Dr. Hickman on 10/26 to manage your primary care needs after discharge.   5: Please follow up with Electrophysiology on 10/13 for further management of your pacemaker.   6: Please follow up with Dr. [place] within 2 weeks of being discharged for further management of your lymphoma    Other comments or requests:    APPTS ARE READY TO BE MADE: [X] YES    Best Family or Patient Contact (if needed):  Vonnie Skaggs (Daughter): [phone]    Additional Information about above appointments (if needed):    1: Please follow up with Oncology at [facility] on  at  for your Neulasta appointment.   2: Please follow up with [facility] Geriatric Outpatient Psychiatric Department within 2 weeks of being discharged for outpatient Psychiatry follow up.  3: Please follow up with Neurology within 2 weeks of being discharged for further outpatient treatment as needed.  4: Please follow up with Dr. Hickman on 10/26 to manage your primary care needs after discharge.   5: Please follow up with Electrophysiology on 10/13 for further management of your pacemaker.   6: Please follow up with Dr. [place] within 2 weeks of being discharged for further management of your lymphoma    Other comments or requests:   3 attempts were made to reach patient, which have been unsuccessful. 3 Voicemails have been left 10/1, 10/2, and 10/3. Will await a call back from patient to coordinate follow up  care with Dr. [place], [facility], and Neurology Clinic.  APPTS ARE READY TO BE MADE: [X] YES    Best Family or Patient Contact (if needed):  Vonnie Skaggs (Daughter): [phone]    Additional Information about above appointments (if needed):    1: Please follow up with Dr. [place] within 2 weeks of being discharged for further chemotherapy and management.   2: Please follow up with [facility] Geriatric Outpatient Psychiatric Department within 2 weeks of being discharged for outpatient Psychiatry follow up.  3: Please follow up with Neurology within 2 weeks of being discharged for further outpatient treatment as needed.  4: Please follow up with Dr. Hickman on 10/26 to manage your primary care needs after discharge.   5: Please follow up with Electrophysiology on 10/13 for further management of your pacemaker.     Other comments or requests:   3 attempts were made to reach patient, which have been unsuccessful. 3 Voicemails have been left 10/1, 10/2, and 10/3. Will await a call back from patient to coordinate follow up  care with Dr. [place], [facility], and Neurology Clinic.  APPTS ARE READY TO BE MADE: [X] YES    Best Family or Patient Contact (if needed):  Vonnie Skaggs (Daughter): [phone]    Additional Information about above appointments (if needed):    1: Please follow up with Dr. [place] within 1 weeks of being discharged for further chemotherapy and management.   2: Please follow up with [facility] Geriatric Outpatient Psychiatric Department within 2 weeks of being discharged for outpatient Psychiatry follow up.  3: Please follow up with Neurology within 2 weeks of being discharged for further outpatient treatment as needed.  4: Please follow up with Dr. Hickman on 10/26 to manage your primary care needs after discharge.   5: Please follow up with Electrophysiology on 10/13 for further management of your pacemaker.     Other comments or requests:   3 attempts were made to reach patient, which have been unsuccessful. 3 Voicemails have been left 10/1, 10/2, and 10/3. Will await a call back from patient to coordinate follow up  care with Dr. [place], [facility], and Neurology Clinic.

## 2022-09-24 LAB
ALBUMIN SERPL ELPH-MCNC: 3.4 G/DL — SIGNIFICANT CHANGE UP (ref 3.3–5)
ALP SERPL-CCNC: 235 U/L — HIGH (ref 40–120)
ALT FLD-CCNC: 22 U/L — SIGNIFICANT CHANGE UP (ref 10–45)
AMMONIA BLD-MCNC: 23 UMOL/L — SIGNIFICANT CHANGE UP (ref 11–55)
ANION GAP SERPL CALC-SCNC: 9 MMOL/L — SIGNIFICANT CHANGE UP (ref 5–17)
APTT BLD: 25.8 SEC — LOW (ref 27.5–35.5)
APTT BLD: 43.3 SEC — HIGH (ref 27.5–35.5)
AST SERPL-CCNC: 18 U/L — SIGNIFICANT CHANGE UP (ref 10–40)
BILIRUB SERPL-MCNC: 0.6 MG/DL — SIGNIFICANT CHANGE UP (ref 0.2–1.2)
BUN SERPL-MCNC: 26 MG/DL — HIGH (ref 7–23)
CALCIUM SERPL-MCNC: 8.9 MG/DL — SIGNIFICANT CHANGE UP (ref 8.4–10.5)
CHLORIDE SERPL-SCNC: 108 MMOL/L — SIGNIFICANT CHANGE UP (ref 96–108)
CO2 SERPL-SCNC: 26 MMOL/L — SIGNIFICANT CHANGE UP (ref 22–31)
CREAT SERPL-MCNC: 1.16 MG/DL — SIGNIFICANT CHANGE UP (ref 0.5–1.3)
EGFR: 67 ML/MIN/1.73M2 — SIGNIFICANT CHANGE UP
GLUCOSE SERPL-MCNC: 115 MG/DL — HIGH (ref 70–99)
HCT VFR BLD CALC: 29.9 % — LOW (ref 39–50)
HCT VFR BLD CALC: 32.2 % — LOW (ref 39–50)
HGB BLD-MCNC: 9 G/DL — LOW (ref 13–17)
HGB BLD-MCNC: 9.5 G/DL — LOW (ref 13–17)
LDH SERPL L TO P-CCNC: 181 U/L — SIGNIFICANT CHANGE UP (ref 50–242)
LDH SERPL L TO P-CCNC: 213 U/L — SIGNIFICANT CHANGE UP (ref 50–242)
MAGNESIUM SERPL-MCNC: 1.6 MG/DL — SIGNIFICANT CHANGE UP (ref 1.6–2.6)
MCHC RBC-ENTMCNC: 26.3 PG — LOW (ref 27–34)
MCHC RBC-ENTMCNC: 26.5 PG — LOW (ref 27–34)
MCHC RBC-ENTMCNC: 29.5 GM/DL — LOW (ref 32–36)
MCHC RBC-ENTMCNC: 30.1 GM/DL — LOW (ref 32–36)
MCV RBC AUTO: 88.2 FL — SIGNIFICANT CHANGE UP (ref 80–100)
MCV RBC AUTO: 89.2 FL — SIGNIFICANT CHANGE UP (ref 80–100)
NRBC # BLD: 0 /100 WBCS — SIGNIFICANT CHANGE UP (ref 0–0)
NRBC # BLD: 0 /100 WBCS — SIGNIFICANT CHANGE UP (ref 0–0)
PHOSPHATE SERPL-MCNC: 3.2 MG/DL — SIGNIFICANT CHANGE UP (ref 2.5–4.5)
PLATELET # BLD AUTO: 266 K/UL — SIGNIFICANT CHANGE UP (ref 150–400)
PLATELET # BLD AUTO: 311 K/UL — SIGNIFICANT CHANGE UP (ref 150–400)
POTASSIUM SERPL-MCNC: 4 MMOL/L — SIGNIFICANT CHANGE UP (ref 3.5–5.3)
POTASSIUM SERPL-SCNC: 4 MMOL/L — SIGNIFICANT CHANGE UP (ref 3.5–5.3)
PROT SERPL-MCNC: 6.5 G/DL — SIGNIFICANT CHANGE UP (ref 6–8.3)
RBC # BLD: 3.39 M/UL — LOW (ref 4.2–5.8)
RBC # BLD: 3.61 M/UL — LOW (ref 4.2–5.8)
RBC # FLD: 20 % — HIGH (ref 10.3–14.5)
RBC # FLD: 20 % — HIGH (ref 10.3–14.5)
SODIUM SERPL-SCNC: 143 MMOL/L — SIGNIFICANT CHANGE UP (ref 135–145)
URATE SERPL-MCNC: 7 MG/DL — SIGNIFICANT CHANGE UP (ref 3.4–8.8)
WBC # BLD: 4.23 K/UL — SIGNIFICANT CHANGE UP (ref 3.8–10.5)
WBC # BLD: 4.5 K/UL — SIGNIFICANT CHANGE UP (ref 3.8–10.5)
WBC # FLD AUTO: 4.23 K/UL — SIGNIFICANT CHANGE UP (ref 3.8–10.5)
WBC # FLD AUTO: 4.5 K/UL — SIGNIFICANT CHANGE UP (ref 3.8–10.5)

## 2022-09-24 PROCEDURE — 99232 SBSQ HOSP IP/OBS MODERATE 35: CPT | Mod: GC

## 2022-09-24 RX ADMIN — Medication 3 MILLIGRAM(S): at 21:18

## 2022-09-24 RX ADMIN — Medication 100 MILLIGRAM(S): at 09:39

## 2022-09-24 RX ADMIN — Medication 3 MILLILITER(S): at 17:50

## 2022-09-24 RX ADMIN — Medication 1 TABLET(S): at 11:54

## 2022-09-24 RX ADMIN — SPIRONOLACTONE 25 MILLIGRAM(S): 25 TABLET, FILM COATED ORAL at 11:56

## 2022-09-24 RX ADMIN — Medication 400 MILLIGRAM(S): at 17:50

## 2022-09-24 RX ADMIN — Medication 3 MILLILITER(S): at 11:54

## 2022-09-24 RX ADMIN — SACUBITRIL AND VALSARTAN 1 TABLET(S): 24; 26 TABLET, FILM COATED ORAL at 09:39

## 2022-09-24 RX ADMIN — HEPARIN SODIUM 1600 UNIT(S)/HR: 5000 INJECTION INTRAVENOUS; SUBCUTANEOUS at 19:17

## 2022-09-24 RX ADMIN — Medication 100 MILLIGRAM(S): at 11:54

## 2022-09-24 RX ADMIN — HEPARIN SODIUM 1300 UNIT(S)/HR: 5000 INJECTION INTRAVENOUS; SUBCUTANEOUS at 07:26

## 2022-09-24 RX ADMIN — Medication 3 MILLILITER(S): at 00:13

## 2022-09-24 RX ADMIN — ATORVASTATIN CALCIUM 40 MILLIGRAM(S): 80 TABLET, FILM COATED ORAL at 21:18

## 2022-09-24 RX ADMIN — HEPARIN SODIUM 1600 UNIT(S)/HR: 5000 INJECTION INTRAVENOUS; SUBCUTANEOUS at 18:40

## 2022-09-24 RX ADMIN — SACUBITRIL AND VALSARTAN 1 TABLET(S): 24; 26 TABLET, FILM COATED ORAL at 17:50

## 2022-09-24 RX ADMIN — HEPARIN SODIUM 1300 UNIT(S)/HR: 5000 INJECTION INTRAVENOUS; SUBCUTANEOUS at 07:28

## 2022-09-24 RX ADMIN — HEPARIN SODIUM 6000 UNIT(S): 5000 INJECTION INTRAVENOUS; SUBCUTANEOUS at 18:45

## 2022-09-24 RX ADMIN — Medication 400 MILLIGRAM(S): at 09:39

## 2022-09-24 RX ADMIN — HEPARIN SODIUM 1300 UNIT(S)/HR: 5000 INJECTION INTRAVENOUS; SUBCUTANEOUS at 08:44

## 2022-09-24 NOTE — PROGRESS NOTE ADULT - PROBLEM SELECTOR PLAN 7
- History of afib w/ CHB s/p Micra pacemaker then s/p MDT CRT-P upgrade 9/30/19  - v paced on EKG, HR 74, Qtc 499     Plan:  - Continue metoprolol succinate 100 mg PO QD  - Eliquis 5 mg BID on hold for LP on Monday; restart following procedure DIET: Dash/TLC  DVT Prophylaxis: Eliquis 5 mg BID ( on HOLD for the LP, now on heparin drip )   Dispo: Pending    Discussed with daughter, Vonnie regarding plan of care.

## 2022-09-24 NOTE — PROGRESS NOTE ADULT - SUBJECTIVE AND OBJECTIVE BOX
GIRMM, RICHARD  72y  Male      Patient is a 72y old  Male who presents with a chief complaint of Confusion (23 Sep 2022 16:53)      INTERVAL HPI/OVERNIGHT EVENTS:          T(C): 36.6 (09-24-22 @ 04:25), Max: 36.6 (09-23-22 @ 21:06)  HR: 90 (09-24-22 @ 04:25) (81 - 90)  BP: 143/83 (09-24-22 @ 04:25) (122/56 - 158/90)  RR: 20 (09-24-22 @ 04:25) (18 - 20)  SpO2: 94% (09-24-22 @ 04:25) (94% - 99%)  Wt(kg): --Vital Signs Last 24 Hrs  T(C): 36.6 (24 Sep 2022 04:25), Max: 36.6 (23 Sep 2022 21:06)  T(F): 97.8 (24 Sep 2022 04:25), Max: 97.8 (23 Sep 2022 21:06)  HR: 90 (24 Sep 2022 04:25) (81 - 90)  BP: 143/83 (24 Sep 2022 04:25) (122/56 - 158/90)  BP(mean): --  RR: 20 (24 Sep 2022 04:25) (18 - 20)  SpO2: 94% (24 Sep 2022 04:25) (94% - 99%)    Parameters below as of 24 Sep 2022 04:25  Patient On (Oxygen Delivery Method): room air      rasburicase (Other)      PHYSICAL EXAM:  GENERAL: Alert.  No acute distress. Appears confused   HEAD:  Atraumatic. Normocephalic.  EYES: EOMI. PERRLA. Normal conjunctiva/sclera.  ENT: No JVD. Moist oral mucosa.    CARDIAC: Regular rate and rhythm. Not irregularly irregular. S1. S2. No murmur.    LUNG/CHEST: Good bilateral air entry  ABDOMEN: Soft. No tenderness. No distension.  Normal bowel sounds.  EXTREMITIES:  No clubbing. No cyanosis. No edema. Moving all 4.  NEUROLOGY: AO 1-2 this AM, increasingly confused   SKIN  : Upper back with clean healing surgical site    Consultant(s) Notes Reviewed:  [x ] YES  [ ] NO  Care Discussed with Consultants/Other Providers [ x] YES  [ ] NO    LABS:      RADIOLOGY & ADDITIONAL TESTS:    Imaging Personally Reviewed:  [ ] YES  [ ] NO  acyclovir   Oral Tab/Cap 400 milliGRAM(s) Oral two times a day  albuterol/ipratropium for Nebulization 3 milliLiter(s) Nebulizer every 6 hours  allopurinol 100 milliGRAM(s) Oral daily  atorvastatin 40 milliGRAM(s) Oral at bedtime  guaiFENesin Oral Liquid (Sugar-Free) 100 milliGRAM(s) Oral every 6 hours PRN  heparin   Injectable 6000 Unit(s) IV Push every 6 hours PRN  heparin   Injectable 3000 Unit(s) IV Push every 6 hours PRN  heparin  Infusion.  Unit(s)/Hr IV Continuous <Continuous>  melatonin 3 milliGRAM(s) Oral at bedtime  metoprolol succinate  milliGRAM(s) Oral daily  multivitamin 1 Tablet(s) Oral daily  sacubitril 49 mG/valsartan 51 mG 1 Tablet(s) Oral two times a day  senna 2 Tablet(s) Oral at bedtime PRN  spironolactone 25 milliGRAM(s) Oral <User Schedule>      HEALTH ISSUES - PROBLEM Dx:  Altered mental status    Positive D dimer    High alkaline phosphatase    Follicular lymphoma    Chronic systolic congestive heart failure    Atrial fibrillation    Prophylactic measure    DM type 2 (diabetes mellitus, type 2)  Never on insulin    Loculated pleural effusion    Chronic atrial fibrillation             GRIMM, RICHARD  72y  Male      Patient is a 72y old  Male who presents with a chief complaint of Confusion (23 Sep 2022 16:53)      INTERVAL HPI/OVERNIGHT EVENTS:  Overnight patient was agitated and confused, took out IV line, allowed for it to be replaced while on heparin drip. Refused meds this AM but then was convinced to take them.   Otherwise denies any sob, cp, n/v, d/c         T(C): 36.6 (09-24-22 @ 04:25), Max: 36.6 (09-23-22 @ 21:06)  HR: 90 (09-24-22 @ 04:25) (81 - 90)  BP: 143/83 (09-24-22 @ 04:25) (122/56 - 158/90)  RR: 20 (09-24-22 @ 04:25) (18 - 20)  SpO2: 94% (09-24-22 @ 04:25) (94% - 99%)  Wt(kg): --Vital Signs Last 24 Hrs  T(C): 36.6 (24 Sep 2022 04:25), Max: 36.6 (23 Sep 2022 21:06)  T(F): 97.8 (24 Sep 2022 04:25), Max: 97.8 (23 Sep 2022 21:06)  HR: 90 (24 Sep 2022 04:25) (81 - 90)  BP: 143/83 (24 Sep 2022 04:25) (122/56 - 158/90)  BP(mean): --  RR: 20 (24 Sep 2022 04:25) (18 - 20)  SpO2: 94% (24 Sep 2022 04:25) (94% - 99%)    Parameters below as of 24 Sep 2022 04:25  Patient On (Oxygen Delivery Method): room air      rasburicase (Other)      PHYSICAL EXAM:  GENERAL: Alert.  No acute distress. Appears confused   HEAD:  Atraumatic. Normocephalic.  EYES: EOMI. PERRLA. Normal conjunctiva/sclera.  ENT: No JVD. Moist oral mucosa.    CARDIAC: Regular rate and rhythm. Not irregularly irregular. S1. S2. No murmur.    LUNG/CHEST: Good bilateral air entry  ABDOMEN: Soft. No tenderness. No distension.  Normal bowel sounds.  EXTREMITIES:  No clubbing. No cyanosis. No edema. Moving all 4.  NEUROLOGY: AO 1-2 this AM, increasingly confused   SKIN  : Upper back with clean healing surgical site    Consultant(s) Notes Reviewed:  [x ] YES  [ ] NO  Care Discussed with Consultants/Other Providers [ x] YES  [ ] NO    LABS:                          9.5    4.50  )-----------( 311      ( 24 Sep 2022 07:12 )             32.2       09-24    143  |  108  |  26<H>  ----------------------------<  115<H>  4.0   |  26  |  1.16    Ca    8.9      24 Sep 2022 04:01  Phos  3.2     09-24  Mg     1.6     09-24    TPro  6.5  /  Alb  3.4  /  TBili  0.6  /  DBili  x   /  AST  18  /  ALT  22  /  AlkPhos  235<H>  09-24              PTT - ( 24 Sep 2022 07:10 )  PTT:43.3 sec        RADIOLOGY & ADDITIONAL TESTS:    Imaging Personally Reviewed:  [ ] YES  [ ] NO  acyclovir   Oral Tab/Cap 400 milliGRAM(s) Oral two times a day  albuterol/ipratropium for Nebulization 3 milliLiter(s) Nebulizer every 6 hours  allopurinol 100 milliGRAM(s) Oral daily  atorvastatin 40 milliGRAM(s) Oral at bedtime  guaiFENesin Oral Liquid (Sugar-Free) 100 milliGRAM(s) Oral every 6 hours PRN  heparin   Injectable 6000 Unit(s) IV Push every 6 hours PRN  heparin   Injectable 3000 Unit(s) IV Push every 6 hours PRN  heparin  Infusion.  Unit(s)/Hr IV Continuous <Continuous>  melatonin 3 milliGRAM(s) Oral at bedtime  metoprolol succinate  milliGRAM(s) Oral daily  multivitamin 1 Tablet(s) Oral daily  sacubitril 49 mG/valsartan 51 mG 1 Tablet(s) Oral two times a day  senna 2 Tablet(s) Oral at bedtime PRN  spironolactone 25 milliGRAM(s) Oral <User Schedule>      HEALTH ISSUES - PROBLEM Dx:  Altered mental status    Positive D dimer    High alkaline phosphatase    Follicular lymphoma    Chronic systolic congestive heart failure    Atrial fibrillation    Prophylactic measure    DM type 2 (diabetes mellitus, type 2)  Never on insulin    Loculated pleural effusion    Chronic atrial fibrillation

## 2022-09-24 NOTE — PROGRESS NOTE ADULT - PROBLEM SELECTOR PLAN 5
- Appears overall euvolemic on exam   - Pro-BNP 84688 <- 00360 (from last admission), troponin 41   - HFrEF with TTE from 09/2022 with EF 30%, severe global left ventricular systolic dysfunction. Mild RV enlargement with decreased RV systolic function    Plan:   - Continue home medications with hold parameters   - Metoprolol succinate 100 mg PO QD  - Entresto 49/51 PO BID with hold parameters   - spironolactone 25 mg PO QD

## 2022-09-24 NOTE — PROGRESS NOTE ADULT - PROBLEM SELECTOR PLAN 6
DIET: Dash/TLC  DVT Prophylaxis: Eliquis 5 mg BID ( on HOLD for the LP )   Dispo: Pending    Discussed with daughterVonnie regarding plan of care. - History of afib w/ CHB s/p Micra pacemaker then s/p MDT CRT-P upgrade 9/30/19  - v paced on EKG, HR 74, Qtc 499     Plan:  - Continue metoprolol succinate 100 mg PO QD  - Eliquis 5 mg BID on hold for LP on Monday; for now on heprain drip, restart following procedure

## 2022-09-24 NOTE — PROGRESS NOTE ADULT - ASSESSMENT
72 year old male with afib (on eliquis), HTN,  complete heart block s/p PPM, HLD, HFrEF (30% in 09/2022), and DLBCL (tx with R-CHOP in 2003, with relapse in 2009 treated with BR) now with recently diagnosed grade 3 follicular lymphoma who was transferred from [facility] for acute on chronic encephalopathy likely 2/2 rhino/entero viral pneumonia i/s/o follicular lymphoma and ongoing chemotherapy treatment. Possible etiologies include drug induced 2/2 chemo vs autoimmune vs paraneoplastic.

## 2022-09-24 NOTE — PROGRESS NOTE ADULT - ATTENDING COMMENTS
Patient seen and examined. Plan as discussed w/ Dr. Evans: encephalopathy persists but patient is reorientable; continue Hep gtt titrated to therapeutic PTTs for Afib and plan for LP next week per neuro IR as part of ongoing workup including paraneoplastic/autoimmune labs sent.

## 2022-09-24 NOTE — PROGRESS NOTE ADULT - PROBLEM SELECTOR PLAN 3
Patient with elevated ALK Phos on admission, but denies and RUQ pain or postprandial pain  - RUQ ultrasound to evaluate for biliary changes: Cholelithiasis with gallbladder sludge. Gallbladder wall thickening with intramural edema.  - HIDA scan with no findings of cholecystitis- Normal hepatobiliary scan. No radionuclide evidence of acute   cholecystitis.  - hepatitis labs non reactive   - consider MRI abdomen if alk phos continue to up-trend   - CTM for pain  -? side effects of the Obinutuzumab

## 2022-09-25 LAB
ALBUMIN SERPL ELPH-MCNC: 3.6 G/DL — SIGNIFICANT CHANGE UP (ref 3.3–5)
ALP SERPL-CCNC: 249 U/L — HIGH (ref 40–120)
ALT FLD-CCNC: 23 U/L — SIGNIFICANT CHANGE UP (ref 10–45)
ANION GAP SERPL CALC-SCNC: 9 MMOL/L — SIGNIFICANT CHANGE UP (ref 5–17)
APTT BLD: 33.3 SEC — SIGNIFICANT CHANGE UP (ref 27.5–35.5)
APTT BLD: 39 SEC — HIGH (ref 27.5–35.5)
APTT BLD: 85 SEC — HIGH (ref 27.5–35.5)
AST SERPL-CCNC: 18 U/L — SIGNIFICANT CHANGE UP (ref 10–40)
BILIRUB SERPL-MCNC: 0.6 MG/DL — SIGNIFICANT CHANGE UP (ref 0.2–1.2)
BUN SERPL-MCNC: 23 MG/DL — SIGNIFICANT CHANGE UP (ref 7–23)
CALCIUM SERPL-MCNC: 9.1 MG/DL — SIGNIFICANT CHANGE UP (ref 8.4–10.5)
CHLORIDE SERPL-SCNC: 107 MMOL/L — SIGNIFICANT CHANGE UP (ref 96–108)
CO2 SERPL-SCNC: 26 MMOL/L — SIGNIFICANT CHANGE UP (ref 22–31)
CREAT SERPL-MCNC: 1.09 MG/DL — SIGNIFICANT CHANGE UP (ref 0.5–1.3)
EGFR: 73 ML/MIN/1.73M2 — SIGNIFICANT CHANGE UP
GLUCOSE SERPL-MCNC: 117 MG/DL — HIGH (ref 70–99)
HCT VFR BLD CALC: 31.5 % — LOW (ref 39–50)
HGB BLD-MCNC: 9.3 G/DL — LOW (ref 13–17)
LDH SERPL L TO P-CCNC: 192 U/L — SIGNIFICANT CHANGE UP (ref 50–242)
MAGNESIUM SERPL-MCNC: 1.6 MG/DL — SIGNIFICANT CHANGE UP (ref 1.6–2.6)
MCHC RBC-ENTMCNC: 26.2 PG — LOW (ref 27–34)
MCHC RBC-ENTMCNC: 29.5 GM/DL — LOW (ref 32–36)
MCV RBC AUTO: 88.7 FL — SIGNIFICANT CHANGE UP (ref 80–100)
NRBC # BLD: 0 /100 WBCS — SIGNIFICANT CHANGE UP (ref 0–0)
PHOSPHATE SERPL-MCNC: 2.4 MG/DL — LOW (ref 2.5–4.5)
PLATELET # BLD AUTO: 305 K/UL — SIGNIFICANT CHANGE UP (ref 150–400)
POTASSIUM SERPL-MCNC: 4.1 MMOL/L — SIGNIFICANT CHANGE UP (ref 3.5–5.3)
POTASSIUM SERPL-SCNC: 4.1 MMOL/L — SIGNIFICANT CHANGE UP (ref 3.5–5.3)
PROT SERPL-MCNC: 7 G/DL — SIGNIFICANT CHANGE UP (ref 6–8.3)
RBC # BLD: 3.55 M/UL — LOW (ref 4.2–5.8)
RBC # FLD: 20 % — HIGH (ref 10.3–14.5)
SODIUM SERPL-SCNC: 142 MMOL/L — SIGNIFICANT CHANGE UP (ref 135–145)
URATE SERPL-MCNC: 6.7 MG/DL — SIGNIFICANT CHANGE UP (ref 3.4–8.8)
WBC # BLD: 6.02 K/UL — SIGNIFICANT CHANGE UP (ref 3.8–10.5)
WBC # FLD AUTO: 6.02 K/UL — SIGNIFICANT CHANGE UP (ref 3.8–10.5)

## 2022-09-25 PROCEDURE — 99232 SBSQ HOSP IP/OBS MODERATE 35: CPT | Mod: GC

## 2022-09-25 RX ADMIN — SACUBITRIL AND VALSARTAN 1 TABLET(S): 24; 26 TABLET, FILM COATED ORAL at 17:48

## 2022-09-25 RX ADMIN — Medication 400 MILLIGRAM(S): at 17:49

## 2022-09-25 RX ADMIN — Medication 3 MILLIGRAM(S): at 22:34

## 2022-09-25 RX ADMIN — HEPARIN SODIUM 1600 UNIT(S)/HR: 5000 INJECTION INTRAVENOUS; SUBCUTANEOUS at 07:28

## 2022-09-25 RX ADMIN — Medication 100 MILLIGRAM(S): at 12:44

## 2022-09-25 RX ADMIN — Medication 63.75 MILLIMOLE(S): at 07:25

## 2022-09-25 RX ADMIN — HEPARIN SODIUM 1900 UNIT(S)/HR: 5000 INJECTION INTRAVENOUS; SUBCUTANEOUS at 17:47

## 2022-09-25 RX ADMIN — SPIRONOLACTONE 25 MILLIGRAM(S): 25 TABLET, FILM COATED ORAL at 12:44

## 2022-09-25 RX ADMIN — Medication 1 TABLET(S): at 12:42

## 2022-09-25 RX ADMIN — HEPARIN SODIUM 1900 UNIT(S)/HR: 5000 INJECTION INTRAVENOUS; SUBCUTANEOUS at 09:21

## 2022-09-25 RX ADMIN — ATORVASTATIN CALCIUM 40 MILLIGRAM(S): 80 TABLET, FILM COATED ORAL at 22:34

## 2022-09-25 RX ADMIN — HEPARIN SODIUM 6000 UNIT(S): 5000 INJECTION INTRAVENOUS; SUBCUTANEOUS at 09:17

## 2022-09-25 NOTE — PROGRESS NOTE ADULT - PROBLEM SELECTOR PLAN 5
- Appears overall euvolemic on exam   - Pro-BNP 43971 <- 13047 (from last admission), troponin 41   - HFrEF with TTE from 09/2022 with EF 30%, severe global left ventricular systolic dysfunction. Mild RV enlargement with decreased RV systolic function    Plan:   - Continue home medications with hold parameters   - Metoprolol succinate 100 mg PO QD  - Entresto 49/51 PO BID with hold parameters   - spironolactone 25 mg PO QD

## 2022-09-25 NOTE — PROGRESS NOTE ADULT - SUBJECTIVE AND OBJECTIVE BOX
GRIMM, RICHARD  72y  Male      Patient is a 72y old  Male who presents with a chief complaint of Confusion (24 Sep 2022 06:44)      INTERVAL HPI/OVERNIGHT EVENTS:  NAEO        T(C): 36.7 (09-25-22 @ 04:48), Max: 36.7 (09-25-22 @ 04:48)  HR: 82 (09-25-22 @ 04:48) (82 - 97)  BP: 149/83 (09-25-22 @ 04:48) (129/71 - 149/83)  RR: 18 (09-25-22 @ 04:48) (18 - 18)  SpO2: 96% (09-25-22 @ 04:48) (96% - 96%)  Wt(kg): --Vital Signs Last 24 Hrs  T(C): 36.7 (25 Sep 2022 04:48), Max: 36.7 (25 Sep 2022 04:48)  T(F): 98.1 (25 Sep 2022 04:48), Max: 98.1 (25 Sep 2022 04:48)  HR: 82 (25 Sep 2022 04:48) (82 - 97)  BP: 149/83 (25 Sep 2022 04:48) (129/71 - 149/83)  BP(mean): --  RR: 18 (25 Sep 2022 04:48) (18 - 18)  SpO2: 96% (25 Sep 2022 04:48) (96% - 96%)    Parameters below as of 25 Sep 2022 04:48  Patient On (Oxygen Delivery Method): room air      rasburicase (Other)      PHYSICAL EXAM:  GENERAL: Alert.  No acute distress. Appears confused   HEAD:  Atraumatic. Normocephalic.  EYES: EOMI. PERRLA. Normal conjunctiva/sclera.  ENT: No JVD. Moist oral mucosa.    CARDIAC: Regular rate and rhythm. Not irregularly irregular. S1. S2. No murmur.    LUNG/CHEST: Good bilateral air entry  ABDOMEN: Soft. No tenderness. No distension.  Normal bowel sounds.  EXTREMITIES:  No clubbing. No cyanosis. No edema. Moving all 4.  NEUROLOGY: AO 1-2 this AM  SKIN  : Upper back with clean healing surgical site    Consultant(s) Notes Reviewed:  [x ] YES  [ ] NO  Care Discussed with Consultants/Other Providers [ x] YES  [ ] NO    LABS:                          9.3    6.02  )-----------( 305      ( 25 Sep 2022 01:16 )             31.5       09-25    142  |  107  |  23  ----------------------------<  117<H>  4.1   |  26  |  1.09    Ca    9.1      25 Sep 2022 01:16  Phos  2.4     09-25  Mg     1.6     09-25    TPro  7.0  /  Alb  3.6  /  TBili  0.6  /  DBili  x   /  AST  18  /  ALT  23  /  AlkPhos  249<H>  09-25              PTT - ( 25 Sep 2022 01:17 )  PTT:33.3 sec            RADIOLOGY & ADDITIONAL TESTS:    Imaging Personally Reviewed:  [ ] YES  [ ] NO  acyclovir   Oral Tab/Cap 400 milliGRAM(s) Oral two times a day  albuterol/ipratropium for Nebulization 3 milliLiter(s) Nebulizer every 6 hours  allopurinol 100 milliGRAM(s) Oral daily  atorvastatin 40 milliGRAM(s) Oral at bedtime  guaiFENesin Oral Liquid (Sugar-Free) 100 milliGRAM(s) Oral every 6 hours PRN  heparin   Injectable 6000 Unit(s) IV Push every 6 hours PRN  heparin   Injectable 3000 Unit(s) IV Push every 6 hours PRN  heparin  Infusion.  Unit(s)/Hr IV Continuous <Continuous>  melatonin 3 milliGRAM(s) Oral at bedtime  metoprolol succinate  milliGRAM(s) Oral daily  multivitamin 1 Tablet(s) Oral daily  sacubitril 49 mG/valsartan 51 mG 1 Tablet(s) Oral two times a day  senna 2 Tablet(s) Oral at bedtime PRN  sodium phosphate IVPB 15 milliMole(s) IV Intermittent once  spironolactone 25 milliGRAM(s) Oral <User Schedule>      HEALTH ISSUES - PROBLEM Dx:  Altered mental status    Positive D dimer    High alkaline phosphatase    Follicular lymphoma    Chronic systolic congestive heart failure    Chronic atrial fibrillation    Prophylactic measure    DM type 2 (diabetes mellitus, type 2)  Never on insulin    Atrial fibrillation    Loculated pleural effusion             GRIMM, RICHARD  72y  Male      Patient is a 72y old  Male who presents with a chief complaint of Confusion (24 Sep 2022 06:44)      INTERVAL HPI/OVERNIGHT EVENTS:  NAEO  Patient is AO 2-3 this AM   resting comfortably in bed, not agitated  ROS negative         T(C): 36.7 (09-25-22 @ 04:48), Max: 36.7 (09-25-22 @ 04:48)  HR: 82 (09-25-22 @ 04:48) (82 - 97)  BP: 149/83 (09-25-22 @ 04:48) (129/71 - 149/83)  RR: 18 (09-25-22 @ 04:48) (18 - 18)  SpO2: 96% (09-25-22 @ 04:48) (96% - 96%)  Wt(kg): --Vital Signs Last 24 Hrs  T(C): 36.7 (25 Sep 2022 04:48), Max: 36.7 (25 Sep 2022 04:48)  T(F): 98.1 (25 Sep 2022 04:48), Max: 98.1 (25 Sep 2022 04:48)  HR: 82 (25 Sep 2022 04:48) (82 - 97)  BP: 149/83 (25 Sep 2022 04:48) (129/71 - 149/83)  BP(mean): --  RR: 18 (25 Sep 2022 04:48) (18 - 18)  SpO2: 96% (25 Sep 2022 04:48) (96% - 96%)    Parameters below as of 25 Sep 2022 04:48  Patient On (Oxygen Delivery Method): room air      rasburicase (Other)      PHYSICAL EXAM:  GENERAL: Alert.  No acute distress. Appears confused   HEAD:  Atraumatic. Normocephalic.  EYES: EOMI. PERRLA. Normal conjunctiva/sclera.  ENT: No JVD. Moist oral mucosa.    CARDIAC: Regular rate and rhythm. Not irregularly irregular. S1. S2. No murmur.    LUNG/CHEST: Good bilateral air entry  ABDOMEN: Soft. No tenderness. No distension.  Normal bowel sounds.  EXTREMITIES:  No clubbing. No cyanosis. No edema. Moving all 4.  NEUROLOGY: AO 2-3 this AM   SKIN  : Upper back with clean healing surgical site    Consultant(s) Notes Reviewed:  [x ] YES  [ ] NO  Care Discussed with Consultants/Other Providers [ x] YES  [ ] NO    LABS:                          9.3    6.02  )-----------( 305      ( 25 Sep 2022 01:16 )             31.5       09-25    142  |  107  |  23  ----------------------------<  117<H>  4.1   |  26  |  1.09    Ca    9.1      25 Sep 2022 01:16  Phos  2.4     09-25  Mg     1.6     09-25    TPro  7.0  /  Alb  3.6  /  TBili  0.6  /  DBili  x   /  AST  18  /  ALT  23  /  AlkPhos  249<H>  09-25              PTT - ( 25 Sep 2022 01:17 )  PTT:33.3 sec            RADIOLOGY & ADDITIONAL TESTS:    Imaging Personally Reviewed:  [ ] YES  [ ] NO  acyclovir   Oral Tab/Cap 400 milliGRAM(s) Oral two times a day  albuterol/ipratropium for Nebulization 3 milliLiter(s) Nebulizer every 6 hours  allopurinol 100 milliGRAM(s) Oral daily  atorvastatin 40 milliGRAM(s) Oral at bedtime  guaiFENesin Oral Liquid (Sugar-Free) 100 milliGRAM(s) Oral every 6 hours PRN  heparin   Injectable 6000 Unit(s) IV Push every 6 hours PRN  heparin   Injectable 3000 Unit(s) IV Push every 6 hours PRN  heparin  Infusion.  Unit(s)/Hr IV Continuous <Continuous>  melatonin 3 milliGRAM(s) Oral at bedtime  metoprolol succinate  milliGRAM(s) Oral daily  multivitamin 1 Tablet(s) Oral daily  sacubitril 49 mG/valsartan 51 mG 1 Tablet(s) Oral two times a day  senna 2 Tablet(s) Oral at bedtime PRN  sodium phosphate IVPB 15 milliMole(s) IV Intermittent once  spironolactone 25 milliGRAM(s) Oral <User Schedule>      HEALTH ISSUES - PROBLEM Dx:  Altered mental status    Positive D dimer    High alkaline phosphatase    Follicular lymphoma    Chronic systolic congestive heart failure    Chronic atrial fibrillation    Prophylactic measure    DM type 2 (diabetes mellitus, type 2)  Never on insulin    Atrial fibrillation    Loculated pleural effusion

## 2022-09-25 NOTE — PROGRESS NOTE ADULT - PROBLEM SELECTOR PLAN 6
- History of afib w/ [facility] s/p Micra pacemaker then s/p MDT CRT-P upgrade 9/30/19  - v paced on EKG, HR 74, Qtc 499     Plan:  - Continue metoprolol succinate 100 mg PO QD  - Eliquis 5 mg BID on hold for LP on Monday; for now on heprain drip, restart following procedure

## 2022-09-25 NOTE — PROGRESS NOTE ADULT - PROBLEM SELECTOR PLAN 1
- Daughter notes that patient is A&Ox3 at baseline but had episodes of confusion at times, mostly during last hospitalization.   - Etiology may be underlying dementia, delirium, vs drug induced i/s/o chemotherapy vs metabolic causes vs neurological vs malignancy  - CTH stable chronic infarct without acute findings  - New cough and RVP positive for rhino/enteroviral pneumonia is c/w viral pneumonia which may be contributory.   - Also consider CNS involvement in lymphoma with leptomeningeal disease or side effects from the medications     Plan:  - Supportive care of rhino-enteroviral pneumonia  - CXR with loculated effusion minimally increased from 08/23. However, not seen on CXR from 09/2022.   - CT chest with no acute findings   - infectious workup negative    - Check syphilis screen, TSH, B12: all normal   - MRI with chronic right posterior parietal stroke without significant interval   change.   - EEG with Mild nonspecific diffuse cerebral dysfunction; No overt asymmetry despite known lesion; There were no epileptiform abnormalities recorded.    - f/u autoimmune encephalitis and paraneoplastic serum panels   - Onc requesting LP; Scheduled for this coming Monday; f/u CSF studies, restart Eliquis following LP - Daughter notes that patient is A&Ox3 at baseline but had episodes of confusion at times, mostly during last hospitalization.   - Etiology may be underlying dementia, delirium, vs drug induced i/s/o chemotherapy vs metabolic causes vs neurological vs malignancy  - CTH stable chronic infarct without acute findings  - New cough and RVP positive for rhino/enteroviral pneumonia is c/w viral pneumonia which may be contributory.   - Also consider CNS involvement in lymphoma with leptomeningeal disease or side effects from the medications     Plan:  - Supportive care of rhino-enteroviral pneumonia  - CXR with loculated effusion minimally increased from 08/23. However, not seen on CXR from 09/2022.   - CT chest with no acute findings   - infectious workup negative    - Check syphilis screen, TSH, B12: all normal   - MRI with chronic right posterior parietal stroke without significant interval   change.   - EEG with Mild nonspecific diffuse cerebral dysfunction; No overt asymmetry despite known lesion; There were no epileptiform abnormalities recorded.    - Onc requesting LP; Scheduled for this coming Monday; f/u CSF studies s per neuro, restart Eliquis following LP

## 2022-09-25 NOTE — PROGRESS NOTE ADULT - ASSESSMENT
72 year old male with afib (on eliquis), HTN,  complete heart block s/p PPM, HLD, HFrEF (30% in 09/2022), and DLBCL (tx with R-CHOP in 2003, with relapse in 2009 treated with BR) now with recently diagnosed grade 3 follicular lymphoma who was transferred from [facility] for acute on chronic encephalopathy likely 2/2 rhino/entero viral pneumonia i/s/o follicular lymphoma and ongoing chemotherapy treatment. Possible etiologies include drug induced 2/2 chemo vs autoimmune vs paraneoplastic.

## 2022-09-25 NOTE — CHART NOTE - NSCHARTNOTEFT_GEN_A_CORE
Notified at 8 pm that pt was refusing heparin infusion. Pt has hx of AFib on Eliquis, switched to heparin gtt in anticipation of LP tomorrow.     Spoke with pt to elucidate his reasons for medication refusal, he demonstrated poor insight & judgment and a hostile demeanor. States that he does not want "injections" of any sort. Attempted to leave the floor, was able to be verbally redirected to return to his room.     Given that pt is on heparin gtt for stroke prophylaxis in AFib, the risk of discontinuation at this point is relatively low compared to the risk of patient becoming more agitated and making further attempts to leave. Discontinued heparin at this time.     Boogie Gonzalez  TEAMS

## 2022-09-25 NOTE — PROGRESS NOTE ADULT - ATTENDING COMMENTS
Patient seen and examined. Plan as discussed w/ Dr. Evans: encephalopathy improved overnight as patient is reorientable; continue Hep gtt titrated to therapeutic PTTs for Afib and plan for LP tomorrow per neuro IR as part of ongoing workup including paraneoplastic/autoimmune labs sent.

## 2022-09-25 NOTE — PROGRESS NOTE ADULT - PROBLEM SELECTOR PLAN 7
DIET: Dash/TLC  DVT Prophylaxis: Eliquis 5 mg BID ( on HOLD for the LP, now on heparin drip )   Dispo: Pending    Discussed with daughter, Vonnie regarding plan of care.

## 2022-09-26 ENCOUNTER — RESULT REVIEW (OUTPATIENT)
Age: 72
End: 2022-09-26

## 2022-09-26 LAB
ALBUMIN SERPL ELPH-MCNC: 3.8 G/DL — SIGNIFICANT CHANGE UP (ref 3.3–5)
ALP SERPL-CCNC: 242 U/L — HIGH (ref 40–120)
ALT FLD-CCNC: 23 U/L — SIGNIFICANT CHANGE UP (ref 10–45)
ANION GAP SERPL CALC-SCNC: 10 MMOL/L — SIGNIFICANT CHANGE UP (ref 5–17)
APPEARANCE CSF: CLEAR — SIGNIFICANT CHANGE UP
APTT BLD: 30.8 SEC — SIGNIFICANT CHANGE UP (ref 27.5–35.5)
AST SERPL-CCNC: 14 U/L — SIGNIFICANT CHANGE UP (ref 10–40)
BILIRUB SERPL-MCNC: 0.6 MG/DL — SIGNIFICANT CHANGE UP (ref 0.2–1.2)
BUN SERPL-MCNC: 23 MG/DL — SIGNIFICANT CHANGE UP (ref 7–23)
CALCIUM SERPL-MCNC: 9.1 MG/DL — SIGNIFICANT CHANGE UP (ref 8.4–10.5)
CHLORIDE SERPL-SCNC: 108 MMOL/L — SIGNIFICANT CHANGE UP (ref 96–108)
CO2 SERPL-SCNC: 25 MMOL/L — SIGNIFICANT CHANGE UP (ref 22–31)
COLOR CSF: SIGNIFICANT CHANGE UP
CREAT SERPL-MCNC: 1.04 MG/DL — SIGNIFICANT CHANGE UP (ref 0.5–1.3)
EGFR: 77 ML/MIN/1.73M2 — SIGNIFICANT CHANGE UP
GLUCOSE CSF-MCNC: 55 MG/DL — SIGNIFICANT CHANGE UP (ref 40–70)
GLUCOSE SERPL-MCNC: 130 MG/DL — HIGH (ref 70–99)
GRAM STN FLD: SIGNIFICANT CHANGE UP
HCT VFR BLD CALC: 30.4 % — LOW (ref 39–50)
HGB BLD-MCNC: 9.1 G/DL — LOW (ref 13–17)
INR BLD: 1.42 RATIO — HIGH (ref 0.88–1.16)
LDH CSF L TO P-CCNC: 27 U/L — SIGNIFICANT CHANGE UP
LDH FLD-CCNC: 27 U/L — SIGNIFICANT CHANGE UP
LDH SERPL L TO P-CCNC: 195 U/L — SIGNIFICANT CHANGE UP (ref 50–242)
LYMPHOCYTES # CSF: 33 % — LOW (ref 40–80)
MAGNESIUM SERPL-MCNC: 1.6 MG/DL — SIGNIFICANT CHANGE UP (ref 1.6–2.6)
MCHC RBC-ENTMCNC: 26.4 PG — LOW (ref 27–34)
MCHC RBC-ENTMCNC: 29.9 GM/DL — LOW (ref 32–36)
MCV RBC AUTO: 88.1 FL — SIGNIFICANT CHANGE UP (ref 80–100)
MONOS+MACROS NFR CSF: 67 % — HIGH (ref 15–45)
NEUTROPHILS # CSF: 0 % — SIGNIFICANT CHANGE UP (ref 0–6)
NRBC # BLD: 0 /100 WBCS — SIGNIFICANT CHANGE UP (ref 0–0)
NRBC NFR CSF: 1 /UL — SIGNIFICANT CHANGE UP (ref 0–5)
PHOSPHATE SERPL-MCNC: 3 MG/DL — SIGNIFICANT CHANGE UP (ref 2.5–4.5)
PLATELET # BLD AUTO: 264 K/UL — SIGNIFICANT CHANGE UP (ref 150–400)
POTASSIUM SERPL-MCNC: 3.8 MMOL/L — SIGNIFICANT CHANGE UP (ref 3.5–5.3)
POTASSIUM SERPL-SCNC: 3.8 MMOL/L — SIGNIFICANT CHANGE UP (ref 3.5–5.3)
PROT CSF-MCNC: 51 MG/DL — HIGH (ref 15–45)
PROT SERPL-MCNC: 7 G/DL — SIGNIFICANT CHANGE UP (ref 6–8.3)
PROTHROM AB SERPL-ACNC: 16.5 SEC — HIGH (ref 10.5–13.4)
RBC # BLD: 3.45 M/UL — LOW (ref 4.2–5.8)
RBC # CSF: 5 /UL — HIGH (ref 0–0)
RBC # FLD: 20.1 % — HIGH (ref 10.3–14.5)
SODIUM SERPL-SCNC: 143 MMOL/L — SIGNIFICANT CHANGE UP (ref 135–145)
SPECIMEN SOURCE: SIGNIFICANT CHANGE UP
TUBE TYPE: SIGNIFICANT CHANGE UP
URATE SERPL-MCNC: 6.5 MG/DL — SIGNIFICANT CHANGE UP (ref 3.4–8.8)
WBC # BLD: 6.17 K/UL — SIGNIFICANT CHANGE UP (ref 3.8–10.5)
WBC # FLD AUTO: 6.17 K/UL — SIGNIFICANT CHANGE UP (ref 3.8–10.5)

## 2022-09-26 PROCEDURE — 99232 SBSQ HOSP IP/OBS MODERATE 35: CPT | Mod: GC

## 2022-09-26 PROCEDURE — 62328 DX LMBR SPI PNXR W/FLUOR/CT: CPT

## 2022-09-26 PROCEDURE — 62270 DX LMBR SPI PNXR: CPT

## 2022-09-26 PROCEDURE — 88108 CYTOPATH CONCENTRATE TECH: CPT | Mod: 26

## 2022-09-26 RX ORDER — MAGNESIUM SULFATE 500 MG/ML
1 VIAL (ML) INJECTION ONCE
Refills: 0 | Status: COMPLETED | OUTPATIENT
Start: 2022-09-26 | End: 2022-09-26

## 2022-09-26 RX ADMIN — Medication 100 MILLIGRAM(S): at 09:21

## 2022-09-26 RX ADMIN — Medication 100 MILLIGRAM(S): at 12:22

## 2022-09-26 RX ADMIN — Medication 1 MILLIGRAM(S): at 22:04

## 2022-09-26 RX ADMIN — Medication 100 GRAM(S): at 09:21

## 2022-09-26 RX ADMIN — SPIRONOLACTONE 25 MILLIGRAM(S): 25 TABLET, FILM COATED ORAL at 12:23

## 2022-09-26 RX ADMIN — Medication 1 TABLET(S): at 12:19

## 2022-09-26 RX ADMIN — SACUBITRIL AND VALSARTAN 1 TABLET(S): 24; 26 TABLET, FILM COATED ORAL at 09:21

## 2022-09-26 RX ADMIN — Medication 400 MILLIGRAM(S): at 09:21

## 2022-09-26 RX ADMIN — Medication 0.5 MILLIGRAM(S): at 13:49

## 2022-09-26 NOTE — PROCEDURE NOTE - NSINFORMCONSENT_GEN_A_CORE
family telephone consent/Benefits, risks, and possible complications of procedure explained to patient/caregiver who verbalized understanding and gave written consent.

## 2022-09-26 NOTE — PROGRESS NOTE ADULT - PROBLEM SELECTOR PLAN 3
Patient with elevated ALK Phos on admission, but denies and RUQ pain or postprandial pain  - RUQ ultrasound to evaluate for biliary changes: Cholelithiasis with gallbladder sludge. Gallbladder wall thickening with intramural edema.  - HIDA scan with no findings of cholecystitis- Normal hepatobiliary scan. No radionuclide evidence of acute   cholecystitis.  - hepatitis labs non reactive   - consider MRI abdomen if alk phos continue to up-trend   - CTM for pain  -? side effects of the Obinutuzumab Patient with elevated ALK Phos on admission, but denies and RUQ pain or postprandial pain  - RUQ ultrasound to evaluate for biliary changes: Cholelithiasis with gallbladder sludge. Gallbladder wall thickening with intramural edema.  - HIDA scan with no findings of cholecystitis- Normal hepatobiliary scan. No radionuclide evidence of acute   cholecystitis.  - hepatitis labs non reactive   - consider MRI abdomen if alk phos continue to up-trend   - CTM for pain  - Documented as side effect of the Obinutuzumab

## 2022-09-26 NOTE — PROGRESS NOTE ADULT - ATTENDING COMMENTS
Patient is awaiting on LP today done today , 9/26 and will f/up on the CSF fluid analysis and I have had a long discussion with patient's daughter , Vonnie who is agreeable for Ativan ( only 0.25 or 0.5 mg for the LP only) .  She states that patient is worse during the hospital stay and the meds ( Benzo ) are making him worse .  I explain to her that patient's poor mentation  might be progression ( pt was noted to be in hosp in 9/2019 ).  She was not aware of the condition being present for so long , and she states that he was only having some memory issues.  If agitations becomes as innue, will discuss with psych about standing dose of Depakote .             Leonie Steele   HOspitalist   [phone] /TEAMS

## 2022-09-26 NOTE — PROGRESS NOTE ADULT - SUBJECTIVE AND OBJECTIVE BOX
Patient is a 72y old  Male who presents with a chief complaint of Confusion (25 Sep 2022 06:42)      SUBJECTIVE / OVERNIGHT EVENTS:    MEDICATIONS  (STANDING):  acyclovir   Oral Tab/Cap 400 milliGRAM(s) Oral two times a day  albuterol/ipratropium for Nebulization 3 milliLiter(s) Nebulizer every 6 hours  allopurinol 100 milliGRAM(s) Oral daily  atorvastatin 40 milliGRAM(s) Oral at bedtime  melatonin 3 milliGRAM(s) Oral at bedtime  metoprolol succinate  milliGRAM(s) Oral daily  multivitamin 1 Tablet(s) Oral daily  sacubitril 49 mG/valsartan 51 mG 1 Tablet(s) Oral two times a day  spironolactone 25 milliGRAM(s) Oral <User Schedule>    MEDICATIONS  (PRN):  guaiFENesin Oral Liquid (Sugar-Free) 100 milliGRAM(s) Oral every 6 hours PRN Cough  senna 2 Tablet(s) Oral at bedtime PRN Constipation      CAPILLARY BLOOD GLUCOSE        I&O's Summary    25 Sep 2022 07:01  -  26 Sep 2022 07:00  --------------------------------------------------------  IN: 0 mL / OUT: 200 mL / NET: -200 mL        Vital Signs Last 24 Hrs  T(C): 36.6 (26 Sep 2022 05:38), Max: 36.9 (25 Sep 2022 21:44)  T(F): 97.8 (26 Sep 2022 05:38), Max: 98.4 (25 Sep 2022 21:44)  HR: 92 (26 Sep 2022 05:38) (86 - 102)  BP: 134/70 (26 Sep 2022 05:38) (134/69 - 147/78)  BP(mean): --  RR: 18 (26 Sep 2022 05:38) (16 - 18)  SpO2: 94% (26 Sep 2022 05:38) (94% - 95%)    Parameters below as of 26 Sep 2022 05:38  Patient On (Oxygen Delivery Method): room air        PHYSICAL EXAM:  GENERAL: Alert.  No acute distress. Appears confused   HEAD:  Atraumatic. Normocephalic.  EYES: EOMI. PERRLA. Normal conjunctiva/sclera.  ENT: No JVD. Moist oral mucosa.    CARDIAC: Regular rate and rhythm. Not irregularly irregular. S1. S2. No murmur.    LUNG/CHEST: Good bilateral air entry  ABDOMEN: Soft. No tenderness. No distension.  Normal bowel sounds.  EXTREMITIES:  No clubbing. No cyanosis. No edema. Moving all 4.  NEUROLOGY: AO 2-3 this AM   SKIN  : Upper back with clean healing surgical site    LABS:                        9.1    6.17  )-----------( 264      ( 26 Sep 2022 00:50 )             30.4      09-26    143  |  108  |  23  ----------------------------<  130<H>  3.8   |  25  |  1.04    Ca    9.1      26 Sep 2022 00:50  Phos  3.0     09-26  Mg     1.6     09-26    TPro  7.0  /  Alb  3.8  /  TBili  0.6  /  DBili  x   /  AST  14  /  ALT  23  /  AlkPhos  242<H>  09-26    PT/INR - ( 26 Sep 2022 00:50 )   PT: 16.5 sec;   INR: 1.42 ratio         PTT - ( 26 Sep 2022 00:50 )  PTT:30.8 sec          RADIOLOGY & ADDITIONAL TESTS:    Imaging Personally Reviewed:    Consultant(s) Notes Reviewed:      Care Discussed with Consultants/Other Providers:     Patient is a 72y old  Male who presents with a chief complaint of Confusion (25 Sep 2022 06:42)      SUBJECTIVE / OVERNIGHT EVENTS: Overnight, patient refused medications. Patient seen and examined at bedside, initially aggressive, however redirectible. Does not recall being on chemotherapy since 2009. Denies complaints.     MEDICATIONS  (STANDING):  acyclovir   Oral Tab/Cap 400 milliGRAM(s) Oral two times a day  albuterol/ipratropium for Nebulization 3 milliLiter(s) Nebulizer every 6 hours  allopurinol 100 milliGRAM(s) Oral daily  atorvastatin 40 milliGRAM(s) Oral at bedtime  melatonin 3 milliGRAM(s) Oral at bedtime  metoprolol succinate  milliGRAM(s) Oral daily  multivitamin 1 Tablet(s) Oral daily  sacubitril 49 mG/valsartan 51 mG 1 Tablet(s) Oral two times a day  spironolactone 25 milliGRAM(s) Oral <User Schedule>    MEDICATIONS  (PRN):  guaiFENesin Oral Liquid (Sugar-Free) 100 milliGRAM(s) Oral every 6 hours PRN Cough  senna 2 Tablet(s) Oral at bedtime PRN Constipation      CAPILLARY BLOOD GLUCOSE        I&O's Summary    25 Sep 2022 07:01  -  26 Sep 2022 07:00  --------------------------------------------------------  IN: 0 mL / OUT: 200 mL / NET: -200 mL        Vital Signs Last 24 Hrs  T(C): 36.6 (26 Sep 2022 05:38), Max: 36.9 (25 Sep 2022 21:44)  T(F): 97.8 (26 Sep 2022 05:38), Max: 98.4 (25 Sep 2022 21:44)  HR: 92 (26 Sep 2022 05:38) (86 - 102)  BP: 134/70 (26 Sep 2022 05:38) (134/69 - 147/78)  BP(mean): --  RR: 18 (26 Sep 2022 05:38) (16 - 18)  SpO2: 94% (26 Sep 2022 05:38) (94% - 95%)    Parameters below as of 26 Sep 2022 05:38  Patient On (Oxygen Delivery Method): room air        PHYSICAL EXAM:  GENERAL: Alert.  No acute distress. Walking with normal gait  HEAD:  Atraumatic. Normocephalic.  EYES: EOMI. PERRLA. Normal conjunctiva/sclera.  ENT: No JVD. Moist oral mucosa.    CARDIAC: Regular rate and rhythm. S1. S2. No murmur.    LUNG/CHEST: Clear to auscultation bilaterally.  ABDOMEN: Soft. No tenderness. No distension.  Normal bowel sounds.  EXTREMITIES:  No clubbing. No cyanosis. No edema. Moving all 4.  NEUROLOGY: AO 2 - knows he is at hospital, unclear why and what date is  PSYCH: Aggressive but redirectible  SKIN  : Upper back with clean healing surgical site    LABS:                        9.1    6.17  )-----------( 264      ( 26 Sep 2022 00:50 )             30.4      09-26    143  |  108  |  23  ----------------------------<  130<H>  3.8   |  25  |  1.04    Ca    9.1      26 Sep 2022 00:50  Phos  3.0     09-26  Mg     1.6     09-26    TPro  7.0  /  Alb  3.8  /  TBili  0.6  /  DBili  x   /  AST  14  /  ALT  23  /  AlkPhos  242<H>  09-26    PT/INR - ( 26 Sep 2022 00:50 )   PT: 16.5 sec;   INR: 1.42 ratio         PTT - ( 26 Sep 2022 00:50 )  PTT:30.8 sec          RADIOLOGY & ADDITIONAL TESTS:    Imaging Personally Reviewed:    Consultant(s) Notes Reviewed:      Care Discussed with Consultants/Other Providers:

## 2022-09-26 NOTE — PROCEDURE NOTE - ADDITIONAL PROCEDURE DETAILS
Indication: Post MRI reprogramming  - normal sensing and pacing thresholds  - stable lead impedances  - Changes Made: MRI sure scan mode turned off.  Patient returned to pre MRI settings DDD 
Indication: Pre MRI reprogramming  - normal sensing and pacing thresholds  - stable lead impedances  - Changes Made: Patient placed in MRI sure scan, mode changed to DOO  - Call post MRI to reprogram
S/P FLUOROSCOPICALLY GUIDED LUMBAR PUNCTURE AT THE L2-L3 LEVEL USING 20 GAUGE SPINAL NEEDLE. DRAINED APPROXIMATELY 18 CC OF CLEAR CEREBROSPINAL FLUID. PT TOLERATED THE PROCEDURE WELL. HEMOSTASIS SECURE. CSF SPECIMENS HAND DELIVERED TO LABORATORY FOR ANALYSIS.

## 2022-09-26 NOTE — PROGRESS NOTE ADULT - PROBLEM SELECTOR PLAN 6
- History of afib w/ CHB s/p Micra pacemaker then s/p MDT CRT-P upgrade 9/30/19  - v paced on EKG, HR 74, Qtc 499     Plan:  - Continue metoprolol succinate 100 mg PO QD  - Eliquis 5 mg BID on hold for LP on Monday; for now on heprain drip, restart following procedure - History of afib w/ [facility]B s/p Micra pacemaker then s/p MDT CRT-P upgrade 9/30/19  - v paced on EKG, HR 74, Qtc 499     Plan:  - Continue metoprolol succinate 100 mg PO QD  - Restart Eliquis 48 hrs after LP

## 2022-09-26 NOTE — MEDICAL STUDENT ADULT H&P (EDUCATION) - NS MD HP STUD ASPLAN PLAN FT
# Encephalopathy  1. LP (glucose, protein, WBC, neurosyphillis, WNV< HSV, fungal, EBV, acid fast bacilli)  2. If LP negative, consider wernicke-korsakoff or alcohol-induced encephalopathy. Obtain thiamine (B1)    #High Alk Phos  1. Likely due to obinutuzumab-induced isolated alk phos increase. Continue to monitor alk phos levels and expect continued down-trend.   2. If alk phos uptrends, consider abdominal MRI    #DLBCL, Follicular Lymphoma  1. Continue chemotherapy regimen of mini-COEP plus obinutuzumab. 3rd weekly dose of cycle 1 (9/16/22) of obinutuzumab on hold due to encephalopathy workup    #Chronic systolic congestive heart failure  1. Continue metoprolol succinate 100 mg PO QD  2. Entresto 49/51 PO BID   3. Spironolactone 25 mg PO QD    #Chronic afib  1. Continue metoprolol succinate 100 mg PO QD  -Eliquis 5 mg   # Encephalopathy  1. LP (glucose, protein, WBC, neurosyphillis, WNV< HSV, fungal, EBV, acid fast bacilli)  2. If LP negative, consider wernicke-korsakoff or alcohol-induced encephalopathy. Obtain thiamine (B1)    #High Alk Phos  1. Likely due to obinutuzumab-induced isolated alk phos increase. Continue to monitor alk phos levels and expect continued down-trend.   2. If alk phos uptrends, consider abdominal MRI    #DLBCL, Follicular Lymphoma  1. Continue chemotherapy regimen of mini-COEP plus obinutuzumab. 3rd weekly dose of cycle 1 (9/16/22) of obinutuzumab on hold due to encephalopathy workup    #Chronic systolic congestive heart failure  1. Continue metoprolol succinate 100 mg PO QD  2. Entresto 49/51 PO BID   3. Spironolactone 25 mg PO QD    #Chronic afib  1. Continue metoprolol succinate 100 mg PO QD  2. Eliquis 5 mg BID on hold for LP. Restart following LP    #Prophylactic  1. DVT prophylaxis with eliquis 5 mg BID (on hold for LP)  2. Was on heparin drip but was discontinued last night 9/25 at 8 pm as pt was agitated and did not want any injections.

## 2022-09-26 NOTE — PROGRESS NOTE ADULT - PROBLEM SELECTOR PLAN 1
- Daughter notes that patient is A&Ox3 at baseline but had episodes of confusion at times, mostly during last hospitalization.   - Etiology may be underlying dementia, delirium, vs drug induced i/s/o chemotherapy vs metabolic causes vs neurological vs malignancy  - CTH stable chronic infarct without acute findings  - New cough and RVP positive for rhino/enteroviral pneumonia is c/w viral pneumonia which may be contributory.   - Also consider CNS involvement in lymphoma with leptomeningeal disease or side effects from the medications     Plan:  - Supportive care of rhino-enteroviral pneumonia  - CXR with loculated effusion minimally increased from 08/23. However, not seen on CXR from 09/2022.   - CT chest with no acute findings   - infectious workup negative    - Check syphilis screen, TSH, B12: all normal   - MRI with chronic right posterior parietal stroke without significant interval   change.   - EEG with Mild nonspecific diffuse cerebral dysfunction; No overt asymmetry despite known lesion; There were no epileptiform abnormalities recorded.    - Onc requesting LP; Scheduled for this coming Monday; f/u CSF studies s per neuro, restart Eliquis following LP - Daughter notes that patient is A&Ox3 at baseline but had episodes of confusion at times, mostly during last hospitalization.   - Etiology may be underlying dementia, delirium, vs drug induced i/s/o chemotherapy vs metabolic causes vs neurological vs malignancy  - CTH stable chronic infarct without acute findings  - New cough and RVP positive for rhino/enteroviral pneumonia is c/w viral pneumonia which may be contributory.   - Also consider CNS involvement in lymphoma with leptomeningeal disease or side effects from the medications     Plan:  - Supportive care of rhino-enteroviral pneumonia  - CXR with loculated effusion minimally increased from 08/23. However, not seen on CXR from 09/2022.   - CT chest with no acute findings   - infectious workup negative    - Check syphilis screen, TSH, B12: all normal   - MRI with chronic right posterior parietal stroke without significant interval   change.   - EEG with Mild nonspecific diffuse cerebral dysfunction; No overt asymmetry despite known lesion; There were no epileptiform abnormalities recorded.    - LP done today, restart Eliquis after 48 hours

## 2022-09-26 NOTE — MEDICAL STUDENT ADULT H&P (EDUCATION) - NS MD HP STUD ASPLAN ASSES FT
RB is a 73 yo male with a hx of afib (on eliquis), HTN, complete heart block s/p PPM, hyperlipidemia, HFrEF (30% EF as of 9/22), Diffuse Large B Cell Lymphoma, and grade 3 follicular lymphoma who was admitted 3-4 weeks ago for TLS and subsequently developed rasburicase induced hemolysis 2/2 G6PD deficiency as well as encephalopathy and is now re-admitted for acute on chronic encephalopathy in setting of rhino-entero viral pneumonia concerning for paraneoplastic vs. autoimmune vs encephalitis/meningitis vs drug induced 2/2 chemotherapy cause. Not to miss is CNS involvement of his DLBCL but is less likely due to negative head MRI results. ALso less likely is seizure cause due to negative EEG, but is still on the differential due to his parietal lesion 2/2 prior stroke.

## 2022-09-26 NOTE — MEDICAL STUDENT ADULT H&P (EDUCATION) - NS MD HP STUD SOCIAL HX FT
Former smoker (quit 20 yrs ago, smoked 10 years <1 pack/day)  Drinks 1-2 drinks every weekend   Unable to reliably obtain information due to AMS

## 2022-09-26 NOTE — PROGRESS NOTE ADULT - ASSESSMENT
72 year old male with afib (on eliquis), HTN,  complete heart block s/p PPM, HLD, HFrEF (30% in 09/2022), and DLBCL (tx with R-CHOP in 2003, with relapse in 2009 treated with BR) now with recently diagnosed grade 3 follicular lymphoma who was transferred from [facility] for acute on chronic encephalopathy likely 2/2 rhino/entero viral pneumonia i/s/o follicular lymphoma and ongoing chemotherapy treatment. Possible etiologies include drug induced 2/2 chemo vs autoimmune vs paraneoplastic.

## 2022-09-26 NOTE — PROGRESS NOTE ADULT - PROBLEM SELECTOR PLAN 7
DIET: Dash/TLC  DVT Prophylaxis: Eliquis 5 mg BID ( on HOLD for the LP, now on heparin drip )   Dispo: Pending    Discussed with daughter, Vonnie regarding plan of care. DIET: Dash/TLC  DVT Prophylaxis: Eliquis 5 mg BID on hold for 48 hours post LP  Dispo: Pending    Discussed with daughterVonnie regarding plan of care.

## 2022-09-26 NOTE — PROGRESS NOTE ADULT - PROBLEM SELECTOR PROBLEM 3
Medication given with food  Instructions reviewed     Yane Gentile RN  09/24/20 6818 High alkaline phosphatase

## 2022-09-26 NOTE — PRE PROCEDURE NOTE - PRE PROCEDURE EVALUATION
Neuroradiology pre-op note    History obtained from previous medical records in electronic medical record    HPI: 72y Male with h/o DLBCL (tx with R-CHOP in 2003, with relapse of DLBCL in 2009 treated with BR) now with multiple areas of palpable lymphadenopathy with biopsies shown to be relapse of DLBCL. Recent hospitalization for hemolysis due to G6PD deficiency and rasburicase. Had been started on O-COEP last admission. Now presenting with encephalopathy of unclear etiology. IR requested to do lumbar puncture to evaluate for CNS involvement of DLBCL or other possible cause of encephalopathy.    Diagnosis: DLBCL with encephalopathy  Procedure: Fluoroscopically guided lumbar puncture                              9.1    6.17  )-----------( 264      ( 26 Sep 2022 00:50 )             30.4     09-26    143  |  108  |  23  ----------------------------<  130<H>  3.8   |  25  |  1.04    Ca    9.1      26 Sep 2022 00:50  Phos  3.0     09-26  Mg     1.6     09-26    TPro  7.0  /  Alb  3.8  /  TBili  0.6  /  DBili  x   /  AST  14  /  ALT  23  /  AlkPhos  242<H>  09-26    PT/INR - ( 26 Sep 2022 00:50 )   PT: 16.5 sec;   INR: 1.42 ratio    PTT - ( 26 Sep 2022 00:50 )  PTT:30.8 sec    COVID-19 PCR: NotDetec (09-23-22 @ 07:45)      Assessment & Plan:  72yMale with h/o DLBCL (tx with R-CHOP in 2003, with relapse of DLBCL in 2009 treated with BR) now with multiple areas of palpable lymphadenopathy with biopsies shown to be relapse of DLBCL. Recent hospitalization for hemolysis due to G6PD deficiency and rasburicase. Had been started on O-COEP last admission. Now presenting with encephalopathy of unclear etiology. IR requested to do lumbar puncture to evaluate for CNS involvement of DLBCL or other possible cause of encephalopathy.    -Will plan for fluoroscopically guided lumbar puncture today.  -Informed consent obtained from patient's daughter on telephone. After risks, benefits, alternatives discussion with patient's daughter on the telephone she verbalized understanding and gave consent. Risks including bleeding, infection, nerve damage, and/or headaches were discussed. Consent was witnessed and documented.    BETZY Hi  Available on [employer] Teams  Spectralink [id number]  Ext [phone]

## 2022-09-26 NOTE — MEDICAL STUDENT ADULT H&P (EDUCATION) - NS MD HP STUD HX OF PRESENT ILLNESS FT
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 He is now admitted for acute on chronic encephalopathy in setting of rhino-entero viral pneumonia and is on admission day 10.

## 2022-09-26 NOTE — PROGRESS NOTE ADULT - PROBLEM SELECTOR PLAN 2
D dimer elevated to 280, however, patient is non-hypoxic or tachypneic, denies any chest pain, and has no lower extremity pain/erythema   Further, patient is currently on elloquis for therapeutic AC i/s/o afib, and has reason for elevated d dimer in setting of cancer   - Per Onc, no need to pursue CT chest with contrast to evaluate for PE D dimer elevated to 280, however, patient is non-hypoxic or tachypneic, denies any chest pain, and has no lower extremity pain/erythema   Further, patient is currently on Eliquis for therapeutic AC i/s/o afib, and has reason for elevated d dimer in setting of cancer   - Per Onc, no need to pursue CT chest with contrast to evaluate for PE

## 2022-09-26 NOTE — PROGRESS NOTE ADULT - PROBLEM SELECTOR PLAN 4
- History of DLBCL (tx with R-CHOP in 2003, with relapse in 2009 treated with BR) now with recently diagnosed grade 3 follicular lymphoma on treatment with a modified regimen of mini-COEP plus Obinutuzumab  - Hematology consulted. Appreciate recs   - C1 Started on 9/1/22 (cycle length q21 days) but full dose obinutuzumab was started 9/2/22. Second dose on 9/9/22  - He was due for his 3rd weekly dose of obinutuzumab of cycle 1 (9/16/22) but now on hold, pending further workup of encephalopathy  - requires a new appointment for metaport placement as they won't do it inpatient   - onc following, requesting LP - History of DLBCL (tx with R-CHOP in 2003, with relapse in 2009 treated with BR) now with recently diagnosed grade 3 follicular lymphoma on treatment with a modified regimen of mini-COEP plus Obinutuzumab  - Hematology consulted. Appreciate recs   - C1 Started on 9/1/22 (cycle length q21 days) but full dose obinutuzumab was started 9/2/22. Second dose on 9/9/22  - He was due for his 3rd weekly dose of obinutuzumab of cycle 1 (9/16/22) but now on hold, pending further workup of encephalopathy  - requires a new appointment for metaport placement outpatient  - onc following, requesting LP

## 2022-09-26 NOTE — PROVIDER CONTACT NOTE (OTHER) - BACKGROUND
Pt pulled out IV while Heparin IV medication was running, Two peripheral IV on Rt AC 22g and RT metacarpal 22g were initiated on patient.
Altered mental status
A-fib
Altered mental status

## 2022-09-26 NOTE — PROCEDURE NOTE - NSSITEPREP_SKIN_A_CORE
povidone iodine (if allergic to chlorhexidine)/Adherence to aseptic technique: hand hygiene prior to donning barriers (gown, gloves), don cap and mask, sterile drape over patient Yes

## 2022-09-26 NOTE — MEDICAL STUDENT ADULT H&P (EDUCATION) - NS MD HP STUD PMH FT
afib (on eliquis), HTN, complete heart block s/p PPM, hyperlipidemia, HFrEF (30% EF as of 9/22), Diffuse Large B Cell Lymphoma (tx qith R-CHOP in 2003, with relapse in 2009 tx with BR), and grade 3 follicular lymphoma (mini-COEP plus obinutuzumab)

## 2022-09-26 NOTE — PROGRESS NOTE ADULT - PROBLEM SELECTOR PLAN 5
- Appears overall euvolemic on exam   - Pro-BNP 48164 <- 66405 (from last admission), troponin 41   - HFrEF with TTE from 09/2022 with EF 30%, severe global left ventricular systolic dysfunction. Mild RV enlargement with decreased RV systolic function    Plan:   - Continue home medications with hold parameters   - Metoprolol succinate 100 mg PO QD  - Entresto 49/51 PO BID with hold parameters   - spironolactone 25 mg PO QD - Appears overall euvolemic on exam   - Pro-BNP 93413 <- 43081 (from last admission), troponin 41   - HFrEF with TTE from 09/2022 with EF 30%, severe global left ventricular systolic dysfunction. Mild RV enlargement with decreased RV systolic function    Plan:   - Continue home medications with hold parameters   - Metoprolol succinate 100 mg PO QD  - Entresto 49/51 PO BID with hold parameters   - spironolactone 25 mg PO QD  - Consider SGLT2 as outpatient

## 2022-09-26 NOTE — MEDICAL STUDENT ADULT H&P (EDUCATION) - NS MD HP STUD MEDICATIONS FT
Acyclovir oral tablet-400 mg 2x/day  Metoprolol succinate extended release-100 mg oral daily  Sacubitril 49 mg/valsartan 51 mg-1 tablet oral 2x daily  Spironolactone-25 mg oral 1x/day  Melatonin-3mg oral at bedtime  Allopurinol-100 mg oral 1x/day  Atorvastatin-40 mg oral at bedtime  Multivitamin Acyclovir oral tablet-400 mg 2x/day  Metoprolol succinate extended release-100 mg oral daily  Sacubitril 49 mg/valsartan 51 mg-1 tablet oral 2x daily  Spironolactone-25 mg oral 1x/day  Melatonin-3mg oral at bedtime  Allopurinol-100 mg oral 1x/day  Atorvastatin-40 mg oral at bedtime  Multivitamin-1 tablet oral daily  Albuterol/ipratropium for nebulization-3 ml nebulizer every 6 hrs  Senna-2 tablets oral at bedtime PRN for constipation

## 2022-09-27 LAB
ACE SERPL-CCNC: 35 U/L — SIGNIFICANT CHANGE UP (ref 14–82)
ALBUMIN SERPL ELPH-MCNC: 3.6 G/DL — SIGNIFICANT CHANGE UP (ref 3.3–5)
ALP SERPL-CCNC: 231 U/L — HIGH (ref 40–120)
ALT FLD-CCNC: 21 U/L — SIGNIFICANT CHANGE UP (ref 10–45)
ANION GAP SERPL CALC-SCNC: 12 MMOL/L — SIGNIFICANT CHANGE UP (ref 5–17)
AST SERPL-CCNC: 17 U/L — SIGNIFICANT CHANGE UP (ref 10–40)
BILIRUB SERPL-MCNC: 0.7 MG/DL — SIGNIFICANT CHANGE UP (ref 0.2–1.2)
BUN SERPL-MCNC: 18 MG/DL — SIGNIFICANT CHANGE UP (ref 7–23)
CALCIUM SERPL-MCNC: 9.1 MG/DL — SIGNIFICANT CHANGE UP (ref 8.4–10.5)
CHLORIDE SERPL-SCNC: 109 MMOL/L — HIGH (ref 96–108)
CO2 SERPL-SCNC: 24 MMOL/L — SIGNIFICANT CHANGE UP (ref 22–31)
CREAT SERPL-MCNC: 1.12 MG/DL — SIGNIFICANT CHANGE UP (ref 0.5–1.3)
CRYPTOC AG CSF-ACNC: NEGATIVE — SIGNIFICANT CHANGE UP
CSF PCR RESULT: SIGNIFICANT CHANGE UP
EGFR: 70 ML/MIN/1.73M2 — SIGNIFICANT CHANGE UP
GLUCOSE BLDC GLUCOMTR-MCNC: 77 MG/DL — SIGNIFICANT CHANGE UP (ref 70–99)
GLUCOSE SERPL-MCNC: 79 MG/DL — SIGNIFICANT CHANGE UP (ref 70–99)
HCT VFR BLD CALC: 33.3 % — LOW (ref 39–50)
HGB BLD-MCNC: 10 G/DL — LOW (ref 13–17)
LABORATORY COMMENT REPORT: SIGNIFICANT CHANGE UP
MAGNESIUM SERPL-MCNC: 1.9 MG/DL — SIGNIFICANT CHANGE UP (ref 1.6–2.6)
MCHC RBC-ENTMCNC: 26.7 PG — LOW (ref 27–34)
MCHC RBC-ENTMCNC: 30 GM/DL — LOW (ref 32–36)
MCV RBC AUTO: 88.8 FL — SIGNIFICANT CHANGE UP (ref 80–100)
N-METHYL-DA RECEPTOR AB IGG BY CBA-IFA, SERUM W/RFLX TITER: SIGNIFICANT CHANGE UP
NIGHT BLUE STAIN TISS: SIGNIFICANT CHANGE UP
NRBC # BLD: 0 /100 WBCS — SIGNIFICANT CHANGE UP (ref 0–0)
PHOSPHATE SERPL-MCNC: 2.9 MG/DL — SIGNIFICANT CHANGE UP (ref 2.5–4.5)
PLATELET # BLD AUTO: 290 K/UL — SIGNIFICANT CHANGE UP (ref 150–400)
POTASSIUM SERPL-MCNC: 3.9 MMOL/L — SIGNIFICANT CHANGE UP (ref 3.5–5.3)
POTASSIUM SERPL-SCNC: 3.9 MMOL/L — SIGNIFICANT CHANGE UP (ref 3.5–5.3)
PROT SERPL-MCNC: 6.9 G/DL — SIGNIFICANT CHANGE UP (ref 6–8.3)
RBC # BLD: 3.75 M/UL — LOW (ref 4.2–5.8)
RBC # FLD: 20.4 % — HIGH (ref 10.3–14.5)
SODIUM SERPL-SCNC: 145 MMOL/L — SIGNIFICANT CHANGE UP (ref 135–145)
SOURCE HSV 1/2: SIGNIFICANT CHANGE UP
SPECIMEN SOURCE: SIGNIFICANT CHANGE UP
TM INTERPRETATION: SIGNIFICANT CHANGE UP
WBC # BLD: 6.77 K/UL — SIGNIFICANT CHANGE UP (ref 3.8–10.5)
WBC # FLD AUTO: 6.77 K/UL — SIGNIFICANT CHANGE UP (ref 3.8–10.5)

## 2022-09-27 PROCEDURE — 99233 SBSQ HOSP IP/OBS HIGH 50: CPT

## 2022-09-27 PROCEDURE — 99232 SBSQ HOSP IP/OBS MODERATE 35: CPT | Mod: GC

## 2022-09-27 RX ORDER — OLANZAPINE 15 MG/1
2.5 TABLET, FILM COATED ORAL ONCE
Refills: 0 | Status: DISCONTINUED | OUTPATIENT
Start: 2022-09-27 | End: 2022-09-27

## 2022-09-27 RX ORDER — APIXABAN 2.5 MG/1
5 TABLET, FILM COATED ORAL EVERY 12 HOURS
Refills: 0 | Status: DISCONTINUED | OUTPATIENT
Start: 2022-09-27 | End: 2022-09-27

## 2022-09-27 RX ADMIN — Medication 3 MILLILITER(S): at 11:59

## 2022-09-27 RX ADMIN — SPIRONOLACTONE 25 MILLIGRAM(S): 25 TABLET, FILM COATED ORAL at 11:59

## 2022-09-27 RX ADMIN — SACUBITRIL AND VALSARTAN 1 TABLET(S): 24; 26 TABLET, FILM COATED ORAL at 06:50

## 2022-09-27 RX ADMIN — SACUBITRIL AND VALSARTAN 1 TABLET(S): 24; 26 TABLET, FILM COATED ORAL at 17:28

## 2022-09-27 RX ADMIN — Medication 100 MILLIGRAM(S): at 11:58

## 2022-09-27 RX ADMIN — Medication 1 TABLET(S): at 11:59

## 2022-09-27 RX ADMIN — Medication 3 MILLILITER(S): at 17:27

## 2022-09-27 RX ADMIN — Medication 3 MILLILITER(S): at 00:32

## 2022-09-27 RX ADMIN — Medication 400 MILLIGRAM(S): at 17:28

## 2022-09-27 RX ADMIN — APIXABAN 5 MILLIGRAM(S): 2.5 TABLET, FILM COATED ORAL at 06:49

## 2022-09-27 RX ADMIN — Medication 3 MILLILITER(S): at 06:50

## 2022-09-27 RX ADMIN — Medication 100 MILLIGRAM(S): at 06:49

## 2022-09-27 RX ADMIN — Medication 400 MILLIGRAM(S): at 06:49

## 2022-09-27 NOTE — PROGRESS NOTE ADULT - ASSESSMENT
72 year old male with afib (on eliquis), HTN,  complete heart block s/p PPM, HLD, HFrEF (30% in 09/2022), and DLBCL (tx with R-CHOP in 2003, with relapse in 2009 treated with BR) now with recently diagnosed grade 3 follicular lymphoma who was transferred from [facility] for acute on chronic encephalopathy likely 2/2 rhino/entero viral pneumonia i/s/o follicular lymphoma and ongoing chemotherapy treatment. Possible etiologies include drug induced 2/2 chemo vs autoimmune vs paraneoplastic.

## 2022-09-27 NOTE — PROGRESS NOTE ADULT - PROBLEM SELECTOR PLAN 3
Patient with elevated ALK Phos on admission, but denies and RUQ pain or postprandial pain  - RUQ ultrasound to evaluate for biliary changes: Cholelithiasis with gallbladder sludge. Gallbladder wall thickening with intramural edema.  - HIDA scan with no findings of cholecystitis- Normal hepatobiliary scan. No radionuclide evidence of acute   cholecystitis.  - hepatitis labs non reactive   - consider MRI abdomen if alk phos continue to up-trend   - CTM for pain  - Documented as side effect of the Obinutuzumab

## 2022-09-27 NOTE — MEDICAL STUDENT PROGRESS NOTE(EDUCATION) - SUBJECTIVE AND OBJECTIVE BOX
Subjective:    Overnight: AMS episode-got 1mg IV ativan.   This morning: Pt says he is "doing great and has no complaints"    Objective:    MEDICATIONS  (STANDING):  acyclovir   Oral Tab/Cap 400 milliGRAM(s) Oral two times a day  albuterol/ipratropium for Nebulization 3 milliLiter(s) Nebulizer every 6 hours  allopurinol 100 milliGRAM(s) Oral daily  atorvastatin 40 milliGRAM(s) Oral at bedtime  melatonin 3 milliGRAM(s) Oral at bedtime  metoprolol succinate  milliGRAM(s) Oral daily  multivitamin 1 Tablet(s) Oral daily  sacubitril 49 mG/valsartan 51 mG 1 Tablet(s) Oral two times a day  spironolactone 25 milliGRAM(s) Oral <User Schedule>    MEDICATIONS  (PRN):  guaiFENesin Oral Liquid (Sugar-Free) 100 milliGRAM(s) Oral every 6 hours PRN Cough  senna 2 Tablet(s) Oral at bedtime PRN Constipation      CAPILLARY BLOOD GLUCOSE      POCT Blood Glucose.: 77 mg/dL (27 Sep 2022 08:17)    I&O's Summary    26 Sep 2022 07:01  -  27 Sep 2022 07:00  --------------------------------------------------------  IN: 50 mL / OUT: 350 mL / NET: -300 mL        Vital Signs Last 24 Hrs  T(C): 36.4 (27 Sep 2022 05:31), Max: 36.4 (26 Sep 2022 20:32)  T(F): 97.6 (27 Sep 2022 05:31), Max: 97.6 (26 Sep 2022 20:32)  HR: 85 (27 Sep 2022 05:31) (75 - 97)  BP: 135/63 (27 Sep 2022 05:31) (135/63 - 155/75)  BP(mean): --  RR: 18 (27 Sep 2022 05:31) (18 - 18)  SpO2: 94% (27 Sep 2022 05:31) (94% - 96%)    Parameters below as of 27 Sep 2022 05:31  Patient On (Oxygen Delivery Method): room air        PHYSICAL EXAM:  GENERAL: Well appearing, lively, no acute distress  HEAD: Atraumatic, Normocephalic  EYES: Conjunctiva and sclera clear  NECK: No JVD, no cervical lymphadenopathy  CHEST/LUNG: Clear to auscultation bilaterally; No wheezes or crackles  HEART: Normal S1/S2; Regular rate and rhythm; No murmurs, rubs, or gallops  ABDOMEN: Soft, Nontender, Nondistended; Bowel sounds present  EXTREMITIES: 2+ Peripheral Pulses; No edema  PSYCH: A&Ox1      LABS:                        10.0   6.77  )-----------( 290      ( 27 Sep 2022 07:26 )             33.3      09-27    145  |  109<H>  |  18  ----------------------------<  79  3.9   |  24  |  1.12    Ca    9.1      27 Sep 2022 07:14  Phos  2.9     09-27  Mg     1.9     09-27    TPro  6.9  /  Alb  3.6  /  TBili  0.7  /  DBili  x   /  AST  17  /  ALT  21  /  AlkPhos  231<H>  09-27    PT/INR - ( 26 Sep 2022 00:50 )   PT: 16.5 sec;   INR: 1.42 ratio         PTT - ( 26 Sep 2022 00:50 )  PTT:30.8 sec      Herpes Simplex Virus 1/2 PCR, CSF (09.26.22 @ 14:58)    Source HSV 1/2: CSF    HSV 1/2 PCR: [facility]:    CSF PCR Panel (09.26.22 @ 14:58)    [facility]    Lactate Dehydrogenase, CSF: 27    Protein, CSF: 51 mg/dL (09.26.22 @ 14:58)    Glucose, CSF: 55 mg/dL (09.26.22 @ 14:58)    Cerebrospinal Fluid Cell Count-1 (09.26.22 @ 14:58)    Total Nucleated Cell Count, CSF: 1 /uL    RBC Count - Spinal Fluid: 5 /uL    CSF Color: No Color    Tube Type: See Note: tube 5    CSF Appearance: Clear    CSF Lymphocytes: 33: Differential is based on 6 cells counted after cytocentrifugation. %    CSF Monocytes/Macrophages: 67 %    CSF Neutrophils: 0 %    Culture - Fungal, CSF (09.26.22 @ 14:57)    Specimen Source: .CSF CSF    Culture Results:   Testing in progress    Culture - CSF with Gram Stain . (09.26.22 @ 14:57)    Gram Stain:   No polymorphonuclear cells seen  No organisms seen  by cytocentrifuge    Specimen Source: .CSF CSF    Culture - Acid Fast - CSF (09.26.22 @ 14:57)    Specimen Source: .CSF CSF    Acid Fast Bacilli Smear:   Less than 5 cc of CSF received,  unable to perform AFB smear.

## 2022-09-27 NOTE — PROGRESS NOTE ADULT - ATTENDING COMMENTS
S/P LP done on 9/26 with no acute finding so far.  Neuro is ok with DC home but onc team wants to wait for Flow cytometry and possibly inpatient treatment .          Leonie Steele   HOspitalist   [phone]/TEAMS

## 2022-09-27 NOTE — PROGRESS NOTE ADULT - PROBLEM SELECTOR PLAN 2
D dimer elevated to 280, however, patient is non-hypoxic or tachypneic, denies any chest pain, and has no lower extremity pain/erythema   Further, patient is currently on Eliquis for therapeutic AC i/s/o afib, and has reason for elevated d dimer in setting of cancer   - Per Onc, no need to pursue CT chest with contrast to evaluate for PE

## 2022-09-27 NOTE — PROGRESS NOTE ADULT - SUBJECTIVE AND OBJECTIVE BOX
Patient is a 72y old  Male who presents with a chief complaint of Confusion (26 Sep 2022 07:04)      SUBJECTIVE / OVERNIGHT EVENTS:    MEDICATIONS  (STANDING):  acyclovir   Oral Tab/Cap 400 milliGRAM(s) Oral two times a day  albuterol/ipratropium for Nebulization 3 milliLiter(s) Nebulizer every 6 hours  allopurinol 100 milliGRAM(s) Oral daily  apixaban 5 milliGRAM(s) Oral every 12 hours  atorvastatin 40 milliGRAM(s) Oral at bedtime  melatonin 3 milliGRAM(s) Oral at bedtime  metoprolol succinate  milliGRAM(s) Oral daily  multivitamin 1 Tablet(s) Oral daily  sacubitril 49 mG/valsartan 51 mG 1 Tablet(s) Oral two times a day  spironolactone 25 milliGRAM(s) Oral <User Schedule>    MEDICATIONS  (PRN):  guaiFENesin Oral Liquid (Sugar-Free) 100 milliGRAM(s) Oral every 6 hours PRN Cough  senna 2 Tablet(s) Oral at bedtime PRN Constipation      CAPILLARY BLOOD GLUCOSE        I&O's Summary    25 Sep 2022 07:01  -  26 Sep 2022 07:00  --------------------------------------------------------  IN: 0 mL / OUT: 200 mL / NET: -200 mL    26 Sep 2022 07:01  -  27 Sep 2022 06:36  --------------------------------------------------------  IN: 50 mL / OUT: 350 mL / NET: -300 mL        Vital Signs Last 24 Hrs  T(C): 36.4 (27 Sep 2022 05:31), Max: 36.9 (26 Sep 2022 13:09)  T(F): 97.6 (27 Sep 2022 05:31), Max: 98.5 (26 Sep 2022 13:09)  HR: 85 (27 Sep 2022 05:31) (75 - 108)  BP: 135/63 (27 Sep 2022 05:31) (135/63 - 160/96)  BP(mean): --  RR: 18 (27 Sep 2022 05:31) (18 - 20)  SpO2: 94% (27 Sep 2022 05:31) (94% - 97%)    Parameters below as of 27 Sep 2022 05:31  Patient On (Oxygen Delivery Method): room air        PHYSICAL EXAM:  GENERAL: Alert.  No acute distress. Walking with normal gait  HEAD:  Atraumatic. Normocephalic.  EYES: EOMI. PERRLA. Normal conjunctiva/sclera.  ENT: No JVD. Moist oral mucosa.    CARDIAC: Regular rate and rhythm. S1. S2. No murmur.    LUNG/CHEST: Clear to auscultation bilaterally.  ABDOMEN: Soft. No tenderness. No distension.  Normal bowel sounds.  EXTREMITIES:  No clubbing. No cyanosis. No edema. Moving all 4.  NEUROLOGY: AO 2 - knows he is at hospital, unclear why and what date is  PSYCH: Aggressive but redirectible  SKIN  : Upper back with clean healing surgical site    LABS:                        9.1    6.17  )-----------( 264      ( 26 Sep 2022 00:50 )             30.4      09-26    143  |  108  |  23  ----------------------------<  130<H>  3.8   |  25  |  1.04    Ca    9.1      26 Sep 2022 00:50  Phos  3.0     09-26  Mg     1.6     09-26    TPro  7.0  /  Alb  3.8  /  TBili  0.6  /  DBili  x   /  AST  14  /  ALT  23  /  AlkPhos  242<H>  09-26    PT/INR - ( 26 Sep 2022 00:50 )   PT: 16.5 sec;   INR: 1.42 ratio         PTT - ( 26 Sep 2022 00:50 )  PTT:30.8 sec          RADIOLOGY & ADDITIONAL TESTS:    Imaging Personally Reviewed:    Consultant(s) Notes Reviewed:      Care Discussed with Consultants/Other Providers:     Patient is a 72y old  Male who presents with a chief complaint of Confusion (26 Sep 2022 07:04)      SUBJECTIVE / OVERNIGHT EVENTS: Patient received 1 dose ativan overnight for agitation and 1 dose Eliquis. When seen and examined at bedside in the morning was pleasantly confused, thought he was at a friend's house. Denies complaints.     MEDICATIONS  (STANDING):  acyclovir   Oral Tab/Cap 400 milliGRAM(s) Oral two times a day  albuterol/ipratropium for Nebulization 3 milliLiter(s) Nebulizer every 6 hours  allopurinol 100 milliGRAM(s) Oral daily  apixaban 5 milliGRAM(s) Oral every 12 hours  atorvastatin 40 milliGRAM(s) Oral at bedtime  melatonin 3 milliGRAM(s) Oral at bedtime  metoprolol succinate  milliGRAM(s) Oral daily  multivitamin 1 Tablet(s) Oral daily  sacubitril 49 mG/valsartan 51 mG 1 Tablet(s) Oral two times a day  spironolactone 25 milliGRAM(s) Oral <User Schedule>    MEDICATIONS  (PRN):  guaiFENesin Oral Liquid (Sugar-Free) 100 milliGRAM(s) Oral every 6 hours PRN Cough  senna 2 Tablet(s) Oral at bedtime PRN Constipation      CAPILLARY BLOOD GLUCOSE        I&O's Summary    25 Sep 2022 07:01  -  26 Sep 2022 07:00  --------------------------------------------------------  IN: 0 mL / OUT: 200 mL / NET: -200 mL    26 Sep 2022 07:01  -  27 Sep 2022 06:36  --------------------------------------------------------  IN: 50 mL / OUT: 350 mL / NET: -300 mL        Vital Signs Last 24 Hrs  T(C): 36.4 (27 Sep 2022 05:31), Max: 36.9 (26 Sep 2022 13:09)  T(F): 97.6 (27 Sep 2022 05:31), Max: 98.5 (26 Sep 2022 13:09)  HR: 85 (27 Sep 2022 05:31) (75 - 108)  BP: 135/63 (27 Sep 2022 05:31) (135/63 - 160/96)  BP(mean): --  RR: 18 (27 Sep 2022 05:31) (18 - 20)  SpO2: 94% (27 Sep 2022 05:31) (94% - 97%)    Parameters below as of 27 Sep 2022 05:31  Patient On (Oxygen Delivery Method): room air        PHYSICAL EXAM:  GENERAL: No acute distress.  HEAD:  Atraumatic. Normocephalic.  EYES: EOMI. PERRLA. Normal conjunctiva/sclera.  ENT: No JVD. Moist oral mucosa.    CARDIAC: Regular rate and rhythm. S1. S2. No murmur.    LUNG/CHEST: Clear to auscultation bilaterally.  ABDOMEN: Soft. No tenderness. No distension.  Normal bowel sounds.  EXTREMITIES:  No clubbing. No cyanosis. No edema. Moving all 4.  NEUROLOGY: AO1 - thinks he is at friend's house  PSYCH: Pleasantly confused  SKIN  : Upper back with clean healing surgical site    LABS:                        9.1    6.17  )-----------( 264      ( 26 Sep 2022 00:50 )             30.4      09-26    143  |  108  |  23  ----------------------------<  130<H>  3.8   |  25  |  1.04    Ca    9.1      26 Sep 2022 00:50  Phos  3.0     09-26  Mg     1.6     09-26    TPro  7.0  /  Alb  3.8  /  TBili  0.6  /  DBili  x   /  AST  14  /  ALT  23  /  AlkPhos  242<H>  09-26    PT/INR - ( 26 Sep 2022 00:50 )   PT: 16.5 sec;   INR: 1.42 ratio         PTT - ( 26 Sep 2022 00:50 )  PTT:30.8 sec          RADIOLOGY & ADDITIONAL TESTS:    Imaging Personally Reviewed:    Consultant(s) Notes Reviewed:      Care Discussed with Consultants/Other Providers:

## 2022-09-27 NOTE — PROGRESS NOTE ADULT - ASSESSMENT
Encephalopathy  Anemia  DLBCL  Follicular lymphoma  HTN  CKD stage II  Complete heart block s/p PPM  Heart failure  Prior stroke (R parietotemporal)  Entero/rhinovirus infection    - Etiology likely toxic/metabolic in setting of acute medical infection with URI. No new focal neurologic deficits so cerebrovascular event much lower on differential. Needed to exclude seizure, especially given nidus of prior R parietotemporal infarct. Also needed to exclude metastatic brain disease but lower suspicion. Much lower suspicion for leptomeningeal process or infection but he is immunosuppressed so agree with further work-up for this. He is slowly improving  - vEEG with mild generalized slowing and no evidence for focal slowing, epileptiform discharges, or seizures. STOPPED  - MRI brain w/ and w/o shows chronic stroke but no new findings and no enhancement  - LP done and thus far unrevealing - follow up cytology, flow cytometry, oligoclonal bands, myelin basic protein, IgG Index  - No neurologic contraindications to discharge if patient is otherwise medically stable and he can follow-up with neurology as outpatient ([phone]). No additional acute inpatient neurologic work-up indicated at this time  - Resume home dose Eliquis when medically stable  - Continue to address above medical problems, as you are doing  - Will sign off, please call with additional questions or concerns

## 2022-09-27 NOTE — PROGRESS NOTE ADULT - PROBLEM SELECTOR PLAN 4
- History of DLBCL (tx with R-CHOP in 2003, with relapse in 2009 treated with BR) now with recently diagnosed grade 3 follicular lymphoma on treatment with a modified regimen of mini-COEP plus Obinutuzumab  - Hematology consulted. Appreciate recs   - C1 Started on 9/1/22 (cycle length q21 days) but full dose obinutuzumab was started 9/2/22. Second dose on 9/9/22  - He was due for his 3rd weekly dose of obinutuzumab of cycle 1 (9/16/22) but now on hold, pending further workup of encephalopathy  - requires a new appointment for metaport placement outpatient  - onc following, requesting LP - History of DLBCL (tx with R-CHOP in 2003, with relapse in 2009 treated with BR) now with recently diagnosed grade 3 follicular lymphoma on treatment with a modified regimen of mini-COEP plus Obinutuzumab  - Hematology consulted. Appreciate recs   - C1 Started on 9/1/22 (cycle length q21 days) but full dose obinutuzumab was started 9/2/22. Second dose on 9/9/22  - He was due for his 3rd weekly dose of obinutuzumab of cycle 1 (9/16/22) but now on hold, pending further workup of encephalopathy  - requires a new appointment for metaport placement outpatient  - onc following, possible inpatient chemo

## 2022-09-27 NOTE — PROGRESS NOTE ADULT - PROBLEM SELECTOR PLAN 7
DIET: Dash/TLC  DVT Prophylaxis: Eliquis 5 mg BID on hold for 48 hours post LP  Dispo: Pending    Discussed with daughterVonnie regarding plan of care. DIET: Dash/TLC  DVT Prophylaxis: Eliquis 5 mg BID on hold for 72 hours post LP  Dispo: Pending    Discussed with daughter, Vonnie regarding plan of care.

## 2022-09-27 NOTE — MEDICAL STUDENT PROGRESS NOTE(EDUCATION) - NS MD HP STUD ASPLAN PLAN FT
#Encephalopathy   #Encephalopathy   - LP shows clear CSF, normal glucose, slightly elevated protein, and lymphocyte predominance concerning for possible viral encephalitis, however viral csf labs negative.  - Also consider CNS involvement in lymphoma with leptomeningeal disease or side effects from the medications but less likely because negative head CT   -Less likely is seizure etiology due to normal EEG       # High alkaline phosphatase.   -Negative RUQ US and HIDA scan  -Likely side effect of pt's Obinutuzumab for chemo    # Follicular lymphoma and DLBCL  -DLBCL (tx with R-CHOP in 2003, with relapse in 2009 treated with BR) now with recently diagnosed grade 3 follicular lymphoma on treatment with a modified regimen of mini-COEP plus Obinutuzumab  - Hematology consulted  - C1 Started on 9/1/22 (cycle length q21 days) but full dose obinutuzumab was started 9/2/22. Second dose on 9/9/22  - He was due for his 3rd weekly dose of obinutuzumab of cycle 1 (9/16/22) but now on hold, pending further workup of encephalopathy  - requires a new appointment for metaport placement outpatient  - onc following, possible inpatient chemo.     #Chronic systolic congestive heart failure.   - HFrEF with TTE from 09/2022 with EF 30%, severe global left ventricular systolic dysfunction. Mild RV enlargement with decreased RV systolic function  - Continue home medications with hold parameters   - Metoprolol succinate 100 mg PO QD  - Entresto 49/51 PO BID with hold parameters   - spironolactone 25 mg PO QD  - Consider SGLT2 as outpatient.    # Chronic atrial fibrillation.   History of afib w/ CHB s/p Micra pacemaker then s/p MDT CRT-P upgrade 9/30/19  - Continue metoprolol succinate 100 mg PO QD  - Restart Eliquis 48 hrs after LP.    #Prophylactic measure.   -DIET: Dash/TLC  DVT Prophylaxis: Eliquis 5 mg BID on hold for 72 hours post LP

## 2022-09-27 NOTE — PROGRESS NOTE ADULT - PROBLEM SELECTOR PLAN 6
- History of afib w/ CHB s/p Micra pacemaker then s/p MDT CRT-P upgrade 9/30/19  - v paced on EKG, HR 74, Qtc 499     Plan:  - Continue metoprolol succinate 100 mg PO QD  - Restart Eliquis 48 hrs after LP

## 2022-09-27 NOTE — MEDICAL STUDENT PROGRESS NOTE(EDUCATION) - NS MD HP STUD ASPLAN ASSES FT
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 He is now re-admitted for acute on chronic encephalopathy concerning for viral encephalitis based on recent lumbar puncture showing clear CSF with normal glucose and high protein parameters with lymphocyte predominance. Less likely is fungal or bacterial cause due to normal glucose levels and less likely is TB meningitis due to only mildly elevated protein. Also on the differential are paraneoplastic vs autoimmune vs drug-induced 2/2 chemotherapy. Not to miss is CNS involvement of his DLBCL though less likely due to negative head MRI. Also possible is seizure cause due to parietal infarct from prior stroke but is negative EEG.

## 2022-09-27 NOTE — PROGRESS NOTE ADULT - PROBLEM SELECTOR PLAN 1
- Daughter notes that patient is A&Ox3 at baseline but had episodes of confusion at times, mostly during last hospitalization.   - Etiology may be underlying dementia, delirium, vs drug induced i/s/o chemotherapy vs metabolic causes vs neurological vs malignancy  - CTH stable chronic infarct without acute findings  - New cough and RVP positive for rhino/enteroviral pneumonia is c/w viral pneumonia which may be contributory.   - Also consider CNS involvement in lymphoma with leptomeningeal disease or side effects from the medications     Plan:  - Supportive care of rhino-enteroviral pneumonia  - CXR with loculated effusion minimally increased from 08/23. However, not seen on CXR from 09/2022.   - CT chest with no acute findings   - infectious workup negative    - Check syphilis screen, TSH, B12: all normal   - MRI with chronic right posterior parietal stroke without significant interval   change.   - EEG with Mild nonspecific diffuse cerebral dysfunction; No overt asymmetry despite known lesion; There were no epileptiform abnormalities recorded.    - LP done today, restart Eliquis after 48 hours - Daughter notes that patient is A&Ox3 at baseline but had episodes of confusion at times, mostly during last hospitalization.   - Etiology may be underlying dementia, delirium, vs drug induced i/s/o chemotherapy vs metabolic causes vs neurological vs malignancy  - CTH stable chronic infarct without acute findings  - New cough and RVP positive for rhino/enteroviral pneumonia is c/w viral pneumonia which may be contributory.   - Also consider CNS involvement in lymphoma with leptomeningeal disease or side effects from the medications     Plan:  - Supportive care of rhino-enteroviral pneumonia  - CXR with loculated effusion minimally increased from 08/23. However, not seen on CXR from 09/2022.   - CT chest with no acute findings   - infectious workup negative    - Check syphilis screen, TSH, B12: all normal   - MRI with chronic right posterior parietal stroke without significant interval change.   - EEG with Mild nonspecific diffuse cerebral dysfunction; No overt asymmetry despite known lesion; There were no epileptiform abnormalities recorded.    - LP done today, restart Eliquis after 72d hours  - Per onc possible inpatient chemo

## 2022-09-27 NOTE — PROGRESS NOTE ADULT - SUBJECTIVE AND OBJECTIVE BOX
NEUROLOGY FOLLOW-UP CONSULT NOTE    RFC: Altered mental status (AMS)    Interval history: No acute neurologic events overnight. Patient continues to be confused but much more calm today. Denies any problems following LP today, no headache. Denies any focal neurologic deficits.    Meds:  MEDICATIONS  (STANDING):  acyclovir   Oral Tab/Cap 400 milliGRAM(s) Oral two times a day  albuterol/ipratropium for Nebulization 3 milliLiter(s) Nebulizer every 6 hours  allopurinol 100 milliGRAM(s) Oral daily  atorvastatin 40 milliGRAM(s) Oral at bedtime  melatonin 3 milliGRAM(s) Oral at bedtime  metoprolol succinate  milliGRAM(s) Oral daily  multivitamin 1 Tablet(s) Oral daily  sacubitril 49 mG/valsartan 51 mG 1 Tablet(s) Oral two times a day  spironolactone 25 milliGRAM(s) Oral <User Schedule>    MEDICATIONS  (PRN):  guaiFENesin Oral Liquid (Sugar-Free) 100 milliGRAM(s) Oral every 6 hours PRN Cough  senna 2 Tablet(s) Oral at bedtime PRN Constipation      PMHx/PSHx/FHx/SHx:  Altered mental status    H/o or current diagnosis of HF- Contraindication to ACEI/ARBs    Family history of CHF (congestive heart failure)    Family history of non-Hodgkin&#x27;s lymphoma (Sibling)    Handoff    MEWS Score    Non-Hodgkins Lymphoma    Diabetes Mellitus    DM type 2 (diabetes mellitus, type 2)    Essential hypertension    Rhinitis, allergic    Systolic heart failure    Moderate mitral regurgitation    Pleural effusion    Atrial flutter, unspecified type    Impaired memory    Acute delirium    Delirium secondary to multiple medical problems    MRI head    MRI head    AMS (altered mental status)    DM type 2 (diabetes mellitus, type 2)    Altered mental status    Chronic systolic congestive heart failure    Atrial fibrillation    Prophylactic measure    Follicular lymphoma    Loculated pleural effusion    Positive D dimer    High alkaline phosphatase    Chronic atrial fibrillation    No significant past surgical history    Non-Hodgkin lymphoma    Cardiac pacemaker    SENT FOR DEHYDRATION    4    Dehydration    [email or web address]        Allergies:  rasburicase (Other)      ROS: All systems negative except as documented in Interval history    O:  T(C): 36.4 (09-27-22 @ 05:31), Max: 36.4 (09-26-22 @ 20:32)  HR: 85 (09-27-22 @ 05:31) (75 - 98)  BP: 135/63 (09-27-22 @ 05:31) (135/63 - 159/93)  RR: 18 (09-27-22 @ 05:31) (18 - 18)  SpO2: 94% (09-27-22 @ 05:31) (94% - 97%)    Focused neurologic exam:  MS - AAO x1.5 (hospital but not which, no date), speech fluent, rep/naming intact, follows commands  CN - PERRLA, EOMI, VFF, face sens/str/hearing WNL b/l, tongue/palate midline, trap 5/5 b/l  Motor - Normal bulk/tone, 5/5 all  Sens - LT/temp intact all  Coord - Grossly appropriate for level of strength  Gait and station - Normal casual gait    Pertinent labs/studies:  CBC with low H/H 10/33 and MCV WNL, otherwise essentially WNL  BMP essentially WNL  Mg WNL, Phos WNL  Albumin WNL, LFT's with inc alk phos 231  B12 WNL, TSH WNL, NH3 WNL, A1C 5.4% WNL, syphilis serology TP (-), COVID (-)  PT/INR inc 16.5/1.42, PTT WNL    CSF (9/26) - clear, RBC 5, WBC 1, glucose 55, protein 51, LDH 27, Cx - no PMN's or organisms, PCR panel (-), HSV 1/2 PCR (-)      vEEG 9/23 -  EEG Classification / Summary:  Mildly abnormal EEG study, awake / drowsy / asleep    -Mild slowing  -----------------------------------------------------------------------------------------------------    Clinical Impression:  Mild nonspecific diffuse cerebral dysfunction  No overt asymmetry despite known lesion.  There were no epileptiform abnormalities recorded.        MRI brain w/ and w/o 9/21 - Chronic right posterior parietal stroke without significant interval   change. No diffusion nor gradient echo abnormality to suggest acute on   chronic or hemorrhagic component.    No new areas concerning for acute transcortical infarction, hemorrhage,   nor mass. No abnormal focal enhancement.

## 2022-09-27 NOTE — PROGRESS NOTE ADULT - PROBLEM SELECTOR PLAN 5
- Appears overall euvolemic on exam   - Pro-BNP 16784 <- 92830 (from last admission), troponin 41   - HFrEF with TTE from 09/2022 with EF 30%, severe global left ventricular systolic dysfunction. Mild RV enlargement with decreased RV systolic function    Plan:   - Continue home medications with hold parameters   - Metoprolol succinate 100 mg PO QD  - Entresto 49/51 PO BID with hold parameters   - spironolactone 25 mg PO QD  - Consider SGLT2 as outpatient

## 2022-09-28 LAB
ALBUMIN CSF-MCNC: 31.7 MG/DL — HIGH (ref 14–25)
ALBUMIN SERPL ELPH-MCNC: 3.8 G/DL — SIGNIFICANT CHANGE UP (ref 3.3–5)
ALBUMIN SERPL ELPH-MCNC: 3146 MG/DL — LOW (ref 3500–5200)
ALP SERPL-CCNC: 214 U/L — HIGH (ref 40–120)
ALT FLD-CCNC: 16 U/L — SIGNIFICANT CHANGE UP (ref 10–45)
ANION GAP SERPL CALC-SCNC: 11 MMOL/L — SIGNIFICANT CHANGE UP (ref 5–17)
AST SERPL-CCNC: 16 U/L — SIGNIFICANT CHANGE UP (ref 10–40)
BILIRUB SERPL-MCNC: 0.7 MG/DL — SIGNIFICANT CHANGE UP (ref 0.2–1.2)
BUN SERPL-MCNC: 18 MG/DL — SIGNIFICANT CHANGE UP (ref 7–23)
CALCIUM SERPL-MCNC: 9.2 MG/DL — SIGNIFICANT CHANGE UP (ref 8.4–10.5)
CHLORIDE SERPL-SCNC: 107 MMOL/L — SIGNIFICANT CHANGE UP (ref 96–108)
CO2 SERPL-SCNC: 27 MMOL/L — SIGNIFICANT CHANGE UP (ref 22–31)
CREAT SERPL-MCNC: 1.08 MG/DL — SIGNIFICANT CHANGE UP (ref 0.5–1.3)
EGFR: 73 ML/MIN/1.73M2 — SIGNIFICANT CHANGE UP
GLUCOSE SERPL-MCNC: 136 MG/DL — HIGH (ref 70–99)
HCT VFR BLD CALC: 33.7 % — LOW (ref 39–50)
HGB BLD-MCNC: 10.3 G/DL — LOW (ref 13–17)
IGG CSF-MCNC: 6.2 MG/DL — HIGH
IGG FLD-MCNC: 1260 MG/DL — SIGNIFICANT CHANGE UP (ref 610–1660)
IGG SYNTH RATE SER+CSF CALC-MRATE: -1.6 MG/DAY — SIGNIFICANT CHANGE UP
IGG/ALB CLEAR SER+CSF-RTO: 0.5 — SIGNIFICANT CHANGE UP
IGG/ALB CSF: 0.2 RATIO — SIGNIFICANT CHANGE UP
IGG/ALB SER: 0.4 RATIO — SIGNIFICANT CHANGE UP
MAGNESIUM SERPL-MCNC: 1.8 MG/DL — SIGNIFICANT CHANGE UP (ref 1.6–2.6)
MCHC RBC-ENTMCNC: 26.2 PG — LOW (ref 27–34)
MCHC RBC-ENTMCNC: 30.6 GM/DL — LOW (ref 32–36)
MCV RBC AUTO: 85.8 FL — SIGNIFICANT CHANGE UP (ref 80–100)
NON-GYNECOLOGICAL CYTOLOGY STUDY: SIGNIFICANT CHANGE UP
NRBC # BLD: 0 /100 WBCS — SIGNIFICANT CHANGE UP (ref 0–0)
PHOSPHATE SERPL-MCNC: 2.7 MG/DL — SIGNIFICANT CHANGE UP (ref 2.5–4.5)
PLATELET # BLD AUTO: 287 K/UL — SIGNIFICANT CHANGE UP (ref 150–400)
POTASSIUM SERPL-MCNC: 3.6 MMOL/L — SIGNIFICANT CHANGE UP (ref 3.5–5.3)
POTASSIUM SERPL-SCNC: 3.6 MMOL/L — SIGNIFICANT CHANGE UP (ref 3.5–5.3)
PROT CSF-MCNC: 61 MG/DL — HIGH (ref 15–45)
PROT SERPL-MCNC: 7.1 G/DL — SIGNIFICANT CHANGE UP (ref 6–8.3)
RBC # BLD: 3.93 M/UL — LOW (ref 4.2–5.8)
RBC # FLD: 20.8 % — HIGH (ref 10.3–14.5)
SODIUM SERPL-SCNC: 145 MMOL/L — SIGNIFICANT CHANGE UP (ref 135–145)
WBC # BLD: 7.83 K/UL — SIGNIFICANT CHANGE UP (ref 3.8–10.5)
WBC # FLD AUTO: 7.83 K/UL — SIGNIFICANT CHANGE UP (ref 3.8–10.5)

## 2022-09-28 PROCEDURE — 99231 SBSQ HOSP IP/OBS SF/LOW 25: CPT

## 2022-09-28 PROCEDURE — 99233 SBSQ HOSP IP/OBS HIGH 50: CPT

## 2022-09-28 PROCEDURE — 99232 SBSQ HOSP IP/OBS MODERATE 35: CPT | Mod: GC

## 2022-09-28 RX ADMIN — SPIRONOLACTONE 25 MILLIGRAM(S): 25 TABLET, FILM COATED ORAL at 13:15

## 2022-09-28 RX ADMIN — SENNA PLUS 2 TABLET(S): 8.6 TABLET ORAL at 22:20

## 2022-09-28 RX ADMIN — Medication 1 TABLET(S): at 13:15

## 2022-09-28 RX ADMIN — Medication 400 MILLIGRAM(S): at 17:50

## 2022-09-28 RX ADMIN — Medication 100 MILLIGRAM(S): at 13:14

## 2022-09-28 RX ADMIN — Medication 3 MILLILITER(S): at 13:16

## 2022-09-28 RX ADMIN — ATORVASTATIN CALCIUM 40 MILLIGRAM(S): 80 TABLET, FILM COATED ORAL at 22:19

## 2022-09-28 RX ADMIN — Medication 3 MILLIGRAM(S): at 22:20

## 2022-09-28 RX ADMIN — Medication 3 MILLILITER(S): at 17:50

## 2022-09-28 RX ADMIN — SACUBITRIL AND VALSARTAN 1 TABLET(S): 24; 26 TABLET, FILM COATED ORAL at 17:50

## 2022-09-28 NOTE — PROGRESS NOTE ADULT - PROBLEM SELECTOR PLAN 7
DIET: Dash/TLC  DVT Prophylaxis: Eliquis 5 mg BID on hold for 72 hours post LP  Dispo: Pending    Discussed with daughter, Vonnie regarding plan of care.

## 2022-09-28 NOTE — PROGRESS NOTE ADULT - ATTENDING COMMENTS
72M hx DLBCL (tx with R-CHOP in 2003, with relapse of DLBCL in 2009 treated with BR) now with multiple areas of palpable lymphadenopathy with biopsies shown to be relapse of DLBCL. Recent hospitalization for hemolysis due to G6PD deficiency and rasburicase. Had been started on O-COEP last admission. Now presenting with encephalopathy.    #Encephalopathy  - MRI brain without acute pathology  - s/p LP, flow cytometry insufficient cells to report. May benefit from repeat LP to r/o CNS involvement after this cycle of chemotherapy. Needs approx 15-20 cc of CSF for flow cytometry.     #Relapsed Diffuse Large B-Cell Lymphoma  - s/p excisional biopsy of back lesion on 9/7 showing  recurrent DLBCL  - Patient is on treatment with a modified regimen of mini-COEP plus Obinutuzumab. C1 Started on 9/1/22 (cycle length q21 days) but full dose obinutuzumab was started 9/2/22.  - plan on treating with C2 O-mini-COEP 09/29  - obi 1000mg D1  - cyclophosphamide 400mg/m2 D1  - vincristine 2mg flat dose D1  - etoposide 30mg/m2 IV D1, 60mg/m2 PO D2,3  - prednisone 100mg PO daily D1-5  - will need outpatient GCSF appt on D4  - will need central access (PICC, mediport) placed prior to chemo  - outpatient f/u with Dr. Cordova at [facility]

## 2022-09-28 NOTE — PROGRESS NOTE ADULT - SUBJECTIVE AND OBJECTIVE BOX
Neuroradiology Follow-Up Note    This is a 72y Male s/p ___________ on _________ in Neuroradiology     S: Patient seen and examined @ bedside. No complaints offered.     Medication:     acyclovir   Oral Tab/Cap: (09-27)  apixaban: (09-27)  metoprolol succinate ER: (09-27)  sacubitril 49 mG/valsartan 51 mG: (09-27)  spironolactone: (09-28)    Vitals:   T(F): 97.6, Max: 97.6 (14:09)  HR: 74  BP: 137/74  RR: 18  SpO2: 95%    Physical Exam:  General: Nontoxic, in NAD, A&O x3.  Abdomen: soft, NTND, no peritoneal signs.  Back:     Drain Device: Drain intact attached to ____. Dressing clean, dry, intact.    LABS:  WBC 7.83 / Hgb 10.3 / Hct 33.7 / Plt 287  Na 145 / K 3.6 / CO2 27 / Cl 107 / BUN 18 / Cr 1.08 / Glucose 136  ALT 16 / AST 16 / Alk Phos 214 / Tbili 0.7  Ptt -- / Pt -- / INR --                            10.3   7.83  )-----------( 287      ( 28 Sep 2022 10:58 )             33.7     09-28    145  |  107  |  18  ----------------------------<  136<H>  3.6   |  27  |  1.08    Ca    9.2      28 Sep 2022 10:58  Phos  2.7     09-28  Mg     1.8     09-28    TPro  7.1  /  Alb  3.8  /  TBili  0.7  /  DBili  x   /  AST  16  /  ALT  16  /  AlkPhos  214<H>  09-28        24hr Drain output: ____  I&O's Detail    27 Sep 2022 07:01  -  28 Sep 2022 07:00  --------------------------------------------------------  IN:    Oral Fluid: 1750 mL  Total IN: 1750 mL    OUT:  Total OUT: 0 mL    Total NET: 1750 mL          Assessment/Plan:  72y Male admitted with Altered mental status    Pt most recently s/p ___________.     -continue global management per primary team  -monitor h/h; transfuse as needed  -trend vs/labs     Please call Neuroradiology at extension [phone] or [phone] and request the procedural Neuroradiology attending or PA with any questions, concerns, or issues regarding above.       Neuroradiology Follow-Up Note    This is a 72y Male s/p fluoroscopically guided lumbar puncture at the L2-L3 level on 9/26/2022 in Neuroradiology     S: Patient seen and examined @ bedside. Pt denies headache or pain at the procedure site.    Medication:     acyclovir   Oral Tab/Cap: (09-27)  apixaban: (09-27)  metoprolol succinate ER: (09-27)  sacubitril 49 mG/valsartan 51 mG: (09-27)  spironolactone: (09-28)    Vitals:   T(F): 97.6, Max: 97.6 (14:09)  HR: 74  BP: 137/74  RR: 18  SpO2: 95%    Physical Exam:  General: Nontoxic, in NAD, Awake and alert  Lower Back: no hematoma, + scab formation, no bleeding, non-tender    LABS:  WBC 7.83 / Hgb 10.3 / Hct 33.7 / Plt 287  Na 145 / K 3.6 / CO2 27 / Cl 107 / BUN 18 / Cr 1.08 / Glucose 136  ALT 16 / AST 16 / Alk Phos 214 / Tbili 0.7  Ptt -- / Pt -- / INR --                            10.3   7.83  )-----------( 287      ( 28 Sep 2022 10:58 )             33.7     09-28    145  |  107  |  18  ----------------------------<  136<H>  3.6   |  27  |  1.08    Ca    9.2      28 Sep 2022 10:58  Phos  2.7     09-28  Mg     1.8     09-28    TPro  7.1  /  Alb  3.8  /  TBili  0.7  /  DBili  x   /  AST  16  /  ALT  16  /  AlkPhos  214<H>  09-28      Cytopathology - Non Gyn Report:   ACCESSION No:  [id number]  Patient:   NIK GRIMM      Accession:                             [id number]    Collected Date/Time:                   9/26/2022 15:48 EDT  Received Date/Time:                    9/26/2022 23:07 EDT    Non-Gynecologic Report - Auth (Verified)    Specimen(s) Submitted  CEREBROSPINAL FLUID, LUMBAR    Final Diagnosis  CEREBROSPINAL FLUID, LUMBAR  ATYPICAL FINDINGS.    Speicmen consists of scattered intermediate sized lymphoid cells.      Flow Cytometry ([id number]): QNS for immunophenotypic analysis  Screened by: Marychuy WAN,(ASCP)  Verified by: SAL TAYLOR MD Pathologist  (Electronic Signature)  Reported on: 09/28/22 12:41 EDT, [facility], [address] [place] [address]. [address] [address]  Phone: [phone]   Fax: [phone]  Cytology technical processing performed at [address] [place] [address]  _________________________________________________________________      Clinical Information  History of DLBCL with new encephalopathy of unclear etiology. Rule out  leptomeningeal disease.    Gross Description  Received: 10 ml clear fluid in CytoLyt  Prepared: 1 Cytospin slide (09.26.22 @ 15:48)    Flow Cytometry (09.26.22 @ 15:02)    TM Interpretation:   Flow Cytometry Final Report  ________________________________________________________________________  Specimen: CSF  Date Collected: 09/26/2022 15:02  Date Received: 09/26/2022 16:45  Date Processed: 09/26/2022 1700  Date Reported: 09/27/2022 17:49  Accession #: [id number]  ________________________________________________________________________  CLINICAL DATA: Rule Out Diffuse Large B-cell Lymphoma    ________________________________________________________________________              Flow cytometric analysis attempted however insufficent cells to report _    ______________________________________________________________________    _    _      Verified By: Zev Haley M.D., M.D.  (Electronic Signature)    This test was developed and its performance characteristics determined by the Flow Cytometry  Laboratory at [facility] laboratories. [address] [place] [address] [place] [address].  Medical Director: Lexx Hickman MD. It has not been cleared or approved by the U.S. Food and Drug  Administration.  The FDA has determined that such clearance or approval is not necessary. This test is used for  clinical purposes. It should not be regarded as investigational or for research. This laboratory is  certified under the Clinical Laboratory Improvement Amendment of 1988 ("CLIA") as qualified to  perform high complexity clinical testing.  _      Flow Cytometry (09.26.22 @ 15:02)    TM Interpretation:   Flow Cytometry Final Report  ________________________________________________________________________  Specimen: CSF  Date Collected: 09/26/2022 15:02  Date Received: 09/26/2022 16:45  Date Processed: 09/26/2022 1700  Date Reported: 09/27/2022 17:49  Accession #: [id number]  ________________________________________________________________________  CLINICAL DATA: Rule Out Diffuse Large B-cell Lymphoma    ________________________________________________________________________              Flow cytometric analysis attempted however insufficent cells to report _    ______________________________________________________________________    _    _      Verified By: Zev Haley M.D., M.D.  (Electronic Signature)    This test was developed and its performance characteristics determined by the Flow Cytometry  Laboratory at [facility] laboratories. [address] [place] [address] [place] [address].  Medical Director: Lexx Hickman MD. It has not been cleared or approved by the U.S. Food and Drug  Administration.  The FDA has determined that such clearance or approval is not necessary. This test is used for  clinical purposes. It should not be regarded as investigational or for research. This laboratory is  certified under the Clinical Laboratory Improvement Amendment of 1988 ("CLIA") as qualified to  perform high complexity clinical testing.  _      Assessment/Plan:  72y Male admitted with DLBCL with encephalopathy/ AMS of unclear etiology s/p fluoroscopically guided lumbar puncture at the L2-L3 level on 9/26/2022.    -continue global management per primary team   -continue care per Hem/Onc team  -trend vs/labs  - Neurorad will sign off - please re-consult PRN    Please call Neuroradiology at extension [phone] or [phone] and request the procedural Neuroradiology attending or PA with any questions, concerns, or issues regarding above.      BETZY Hi  Available on [employer] Teams  Spectralink [phone] Ypc [phone]

## 2022-09-28 NOTE — DIETITIAN INITIAL EVALUATION ADULT - REASON FOR ADMISSION
73yo Male with PMH of afib (on eliquis), HTN,  complete heart block s/p PPM, HLD, HFrEF (30% in 09/2022), and DLBCL (tx with R-CHOP in 2003, with relapse in 2009 treated with BR) now with recently diagnosed grade 3 follicular lymphoma. Pt was transferred from [facility] for AMS on 9/16.

## 2022-09-28 NOTE — DIETITIAN INITIAL EVALUATION ADULT - PERTINENT MEDS FT
MEDICATIONS  (STANDING):  acyclovir   Oral Tab/Cap 400 milliGRAM(s) Oral two times a day  albuterol/ipratropium for Nebulization 3 milliLiter(s) Nebulizer every 6 hours  allopurinol 100 milliGRAM(s) Oral daily  atorvastatin 40 milliGRAM(s) Oral at bedtime  melatonin 3 milliGRAM(s) Oral at bedtime  metoprolol succinate  milliGRAM(s) Oral daily  multivitamin 1 Tablet(s) Oral daily  sacubitril 49 mG/valsartan 51 mG 1 Tablet(s) Oral two times a day  spironolactone 25 milliGRAM(s) Oral <User Schedule>    MEDICATIONS  (PRN):  guaiFENesin Oral Liquid (Sugar-Free) 100 milliGRAM(s) Oral every 6 hours PRN Cough  senna 2 Tablet(s) Oral at bedtime PRN Constipation

## 2022-09-28 NOTE — PROGRESS NOTE ADULT - SUBJECTIVE AND OBJECTIVE BOX
Hematology Oncology Follow-up    INTERVAL HPI/OVERNIGHT EVENTS:  No o/n events, patient resting comfortably. No complaints at this time.     VITAL SIGNS:  T(F): 98 (09-27-22 @ 13:55)  HR: 74 (09-27-22 @ 13:55)  BP: 143/74 (09-27-22 @ 13:55)  RR: 18 (09-27-22 @ 13:55)  SpO2: 95% (09-27-22 @ 13:55)  Wt(kg): --    09-27-22 @ 07:01  -  09-28-22 @ 07:00  --------------------------------------------------------  IN: 1750 mL / OUT: 0 mL / NET: 1750 mL          Review of Systems:  General: denies fevers/chills  Respiratory: denies cough, shortness of breath  Cardiovascular: denies chest pain, palpitations  Gastrointestinal: denies nausea, vomiting, abdominal pain, constipation, diarrhea, melena, hematochezia  MSK: denies joint pain or muscle pain  Neuro: denies headache, weakness, or parasthesias  Skin: denies rash, petichiae, echymoses  Psych: denies anxiety or sleep disturbances    PHYSICAL EXAM:  Constitutional: NAD  Respiratory: symmetric chest rise, with normal respiratory effort  Cardiovascular: RRR  Gastrointestinal: soft, NTND  Extremities:  no edema  MSK: no obvious abnormalities  Neurological: AOx2 (person, place)  Skin: no rash, no echymoses, no petichiae  Psych: normal affect    MEDICATIONS  (STANDING):  acyclovir   Oral Tab/Cap 400 milliGRAM(s) Oral two times a day  albuterol/ipratropium for Nebulization 3 milliLiter(s) Nebulizer every 6 hours  allopurinol 100 milliGRAM(s) Oral daily  atorvastatin 40 milliGRAM(s) Oral at bedtime  melatonin 3 milliGRAM(s) Oral at bedtime  metoprolol succinate  milliGRAM(s) Oral daily  multivitamin 1 Tablet(s) Oral daily  sacubitril 49 mG/valsartan 51 mG 1 Tablet(s) Oral two times a day  spironolactone 25 milliGRAM(s) Oral <User Schedule>    MEDICATIONS  (PRN):  guaiFENesin Oral Liquid (Sugar-Free) 100 milliGRAM(s) Oral every 6 hours PRN Cough  senna 2 Tablet(s) Oral at bedtime PRN Constipation      rasburicase (Other)      LABS:                        10.0   6.77  )-----------( 290      ( 27 Sep 2022 07:26 )             33.3     09-27    145  |  109<H>  |  18  ----------------------------<  79  3.9   |  24  |  1.12    Ca    9.1      27 Sep 2022 07:14  Phos  2.9     09-27  Mg     1.9     09-27    TPro  6.9  /  Alb  3.6  /  TBili  0.7  /  DBili  x   /  AST  17  /  ALT  21  /  AlkPhos  231<H>  09-27                     RADIOLOGY & ADDITIONAL TESTS:  Studies reviewed.

## 2022-09-28 NOTE — DIETITIAN INITIAL EVALUATION ADULT - SIGNS/SYMPTOMS
as evidenced by CA on active chemo  as evidenced by >13% weight loss x 1 month; severe signs of muscle/fat loss

## 2022-09-28 NOTE — DIETITIAN INITIAL EVALUATION ADULT - PERTINENT LABORATORY DATA
09-28    145  |  107  |  18  ----------------------------<  136<H>  3.6   |  27  |  1.08    Ca    9.2      28 Sep 2022 10:58  Phos  2.7     09-28  Mg     1.8     09-28    TPro  7.1  /  Alb  3.8  /  TBili  0.7  /  DBili  x   /  AST  16  /  ALT  16  /  AlkPhos  214<H>  09-28  A1C with Estimated Average Glucose Result: 5.4 % (08-29-22 @ 05:55)

## 2022-09-28 NOTE — PROGRESS NOTE ADULT - PROBLEM SELECTOR PLAN 4
- History of DLBCL (tx with R-CHOP in 2003, with relapse in 2009 treated with BR) now with recently diagnosed grade 3 follicular lymphoma on treatment with a modified regimen of mini-COEP plus Obinutuzumab  - Hematology consulted. Appreciate recs   - C1 Started on 9/1/22 (cycle length q21 days) but full dose obinutuzumab was started 9/2/22. Second dose on 9/9/22  - He was due for his 3rd weekly dose of obinutuzumab of cycle 1 (9/16/22) but now on hold, pending further workup of encephalopathy  - requires a new appointment for metaport placement outpatient  - onc following, possible inpatient chemo Patient with elevated ALK Phos on admission, but denies and RUQ pain or postprandial pain  - RUQ ultrasound to evaluate for biliary changes: Cholelithiasis with gallbladder sludge. Gallbladder wall thickening with intramural edema.  - HIDA scan with no findings of cholecystitis- Normal hepatobiliary scan. No radionuclide evidence of acute   cholecystitis.  - hepatitis labs non reactive   - consider MRI abdomen if alk phos continue to up-trend   - CTM for pain  - Documented as side effect of the Obinutuzumab

## 2022-09-28 NOTE — DIETITIAN INITIAL EVALUATION ADULT - NSICDXPASTMEDICALHX_GEN_ALL_CORE_FT
PAST MEDICAL HISTORY:  Atrial flutter, unspecified type     DM type 2 (diabetes mellitus, type 2) Never on insulin    Essential hypertension     Impaired memory     Moderate mitral regurgitation     Non-Hodgkins Lymphoma In [facility] for > 10 years now with relapse    Pleural effusion     Rhinitis, allergic     Systolic heart failure

## 2022-09-28 NOTE — DIETITIAN INITIAL EVALUATION ADULT - OTHER INFO
GI/Intake:   -Per flowsheet: % intake of meals; PCA endorses 100% intake of breakfast this morning   -Last BM documented 9/22; bowel regimen noted (Senna)     Endo:   Hx of T2DM; latest A1c: 5.4% - controlled  -No home DM medication noted in H&P  -No insulin ordered in-house    Onc:   -Hx of DLBCL   -Now with recent dx of grade 3 follicular lymphoma   -Undergoing active treatment at outpatient cancer center     Weight Hx:  -Current dosing weight: 161 pounds  - ? accuracy   -Daily weight: 145.5 pounds (9/28/22) - appears more accurate   -Per previous RD note: 169.1 pounds (8/29/22)

## 2022-09-28 NOTE — PROGRESS NOTE ADULT - ASSESSMENT
72 year old male with afib (on eliquis), HTN,  complete heart block s/p PPM, HLD, HFrEF (30% in 09/2022), and DLBCL (tx with R-CHOP in 2003, with relapse in 2009 treated with BR) now with recently diagnosed grade 3 follicular lymphoma who was transferred from [facility] for acute on chronic encephalopathy likely 2/2 rhino/entero viral pneumonia i/s/o follicular lymphoma and ongoing chemotherapy treatment. Possible etiologies include drug induced 2/2 chemo vs autoimmune vs paraneoplastic.

## 2022-09-28 NOTE — PROGRESS NOTE ADULT - SUBJECTIVE AND OBJECTIVE BOX
Patient is a 72y old  Male who presents with a chief complaint of Confusion (27 Sep 2022 13:39)      SUBJECTIVE / OVERNIGHT EVENTS:    MEDICATIONS  (STANDING):  acyclovir   Oral Tab/Cap 400 milliGRAM(s) Oral two times a day  albuterol/ipratropium for Nebulization 3 milliLiter(s) Nebulizer every 6 hours  allopurinol 100 milliGRAM(s) Oral daily  atorvastatin 40 milliGRAM(s) Oral at bedtime  melatonin 3 milliGRAM(s) Oral at bedtime  metoprolol succinate  milliGRAM(s) Oral daily  multivitamin 1 Tablet(s) Oral daily  sacubitril 49 mG/valsartan 51 mG 1 Tablet(s) Oral two times a day  spironolactone 25 milliGRAM(s) Oral <User Schedule>    MEDICATIONS  (PRN):  guaiFENesin Oral Liquid (Sugar-Free) 100 milliGRAM(s) Oral every 6 hours PRN Cough  senna 2 Tablet(s) Oral at bedtime PRN Constipation      CAPILLARY BLOOD GLUCOSE      POCT Blood Glucose.: 77 mg/dL (27 Sep 2022 08:17)    I&O's Summary    26 Sep 2022 07:01  -  27 Sep 2022 07:00  --------------------------------------------------------  IN: 50 mL / OUT: 350 mL / NET: -300 mL    27 Sep 2022 07:01  -  28 Sep 2022 06:54  --------------------------------------------------------  IN: 1750 mL / OUT: 0 mL / NET: 1750 mL        Vital Signs Last 24 Hrs  T(C): 36.7 (27 Sep 2022 13:55), Max: 36.7 (27 Sep 2022 13:55)  T(F): 98 (27 Sep 2022 13:55), Max: 98 (27 Sep 2022 13:55)  HR: 74 (27 Sep 2022 13:55) (74 - 74)  BP: 143/74 (27 Sep 2022 13:55) (143/74 - 143/74)  BP(mean): --  RR: 18 (27 Sep 2022 13:55) (18 - 18)  SpO2: 95% (27 Sep 2022 13:55) (95% - 95%)    Parameters below as of 27 Sep 2022 13:55  Patient On (Oxygen Delivery Method): room air        PHYSICAL EXAM:  GENERAL: No acute distress.  HEAD:  Atraumatic. Normocephalic.  EYES: EOMI. PERRLA. Normal conjunctiva/sclera.  ENT: No JVD. Moist oral mucosa.    CARDIAC: Regular rate and rhythm. S1. S2. No murmur.    LUNG/CHEST: Clear to auscultation bilaterally.  ABDOMEN: Soft. No tenderness. No distension.  Normal bowel sounds.  EXTREMITIES:  No clubbing. No cyanosis. No edema. Moving all 4.  NEUROLOGY: AO1 - thinks he is at friend's house  PSYCH: Pleasantly confused  SKIN  : Upper back with clean healing surgical site    LABS:                        10.0   6.77  )-----------( 290      ( 27 Sep 2022 07:26 )             33.3      09-27    145  |  109<H>  |  18  ----------------------------<  79  3.9   |  24  |  1.12    Ca    9.1      27 Sep 2022 07:14  Phos  2.9     09-27  Mg     1.9     09-27    TPro  6.9  /  Alb  3.6  /  TBili  0.7  /  DBili  x   /  AST  17  /  ALT  21  /  AlkPhos  231<H>  09-27              RADIOLOGY & ADDITIONAL TESTS:    Imaging Personally Reviewed:    Consultant(s) Notes Reviewed:      Care Discussed with Consultants/Other Providers:     Patient is a 72y old  Male who presents with a chief complaint of Confusion (27 Sep 2022 13:39)      SUBJECTIVE / OVERNIGHT EVENTS: No acute events overnight. Patient seen and examined at bedside, remains pleasantly confused, denies complaints.     MEDICATIONS  (STANDING):  acyclovir   Oral Tab/Cap 400 milliGRAM(s) Oral two times a day  albuterol/ipratropium for Nebulization 3 milliLiter(s) Nebulizer every 6 hours  allopurinol 100 milliGRAM(s) Oral daily  atorvastatin 40 milliGRAM(s) Oral at bedtime  melatonin 3 milliGRAM(s) Oral at bedtime  metoprolol succinate  milliGRAM(s) Oral daily  multivitamin 1 Tablet(s) Oral daily  sacubitril 49 mG/valsartan 51 mG 1 Tablet(s) Oral two times a day  spironolactone 25 milliGRAM(s) Oral <User Schedule>    MEDICATIONS  (PRN):  guaiFENesin Oral Liquid (Sugar-Free) 100 milliGRAM(s) Oral every 6 hours PRN Cough  senna 2 Tablet(s) Oral at bedtime PRN Constipation      CAPILLARY BLOOD GLUCOSE      POCT Blood Glucose.: 77 mg/dL (27 Sep 2022 08:17)    I&O's Summary    26 Sep 2022 07:01  -  27 Sep 2022 07:00  --------------------------------------------------------  IN: 50 mL / OUT: 350 mL / NET: -300 mL    27 Sep 2022 07:01  -  28 Sep 2022 06:54  --------------------------------------------------------  IN: 1750 mL / OUT: 0 mL / NET: 1750 mL        Vital Signs Last 24 Hrs  T(C): 36.7 (27 Sep 2022 13:55), Max: 36.7 (27 Sep 2022 13:55)  T(F): 98 (27 Sep 2022 13:55), Max: 98 (27 Sep 2022 13:55)  HR: 74 (27 Sep 2022 13:55) (74 - 74)  BP: 143/74 (27 Sep 2022 13:55) (143/74 - 143/74)  BP(mean): --  RR: 18 (27 Sep 2022 13:55) (18 - 18)  SpO2: 95% (27 Sep 2022 13:55) (95% - 95%)    Parameters below as of 27 Sep 2022 13:55  Patient On (Oxygen Delivery Method): room air        PHYSICAL EXAM:  GENERAL: No acute distress.  HEAD:  Atraumatic. Normocephalic.  EYES: EOMI. PERRLA. Normal conjunctiva/sclera.  ENT: No JVD. Moist oral mucosa.    CARDIAC: Regular rate and rhythm. S1. S2. No murmur.    LUNG/CHEST: Clear to auscultation bilaterally.  ABDOMEN: Soft. No tenderness. No distension.  Normal bowel sounds.  EXTREMITIES:  No clubbing. No cyanosis. No edema. Moving all 4.  NEUROLOGY: AO2  PSYCH: Pleasantly confused  SKIN  : Upper back with clean healing surgical site    LABS:                        10.0   6.77  )-----------( 290      ( 27 Sep 2022 07:26 )             33.3      09-27    145  |  109<H>  |  18  ----------------------------<  79  3.9   |  24  |  1.12    Ca    9.1      27 Sep 2022 07:14  Phos  2.9     09-27  Mg     1.9     09-27    TPro  6.9  /  Alb  3.6  /  TBili  0.7  /  DBili  x   /  AST  17  /  ALT  21  /  AlkPhos  231<H>  09-27              RADIOLOGY & ADDITIONAL TESTS:    Imaging Personally Reviewed:    Consultant(s) Notes Reviewed:      Care Discussed with Consultants/Other Providers:

## 2022-09-28 NOTE — PROGRESS NOTE ADULT - ATTENDING COMMENTS
Plan for inpatient CHemo as per Onc team , onc team requests for IV access and I have called Patient's daughter , Vonnie Skaggs who is not agreable for a PICC line and she requests for a  Port which IR team states that it is not done inpatient due to high risk of infection .  The onc team will reach out to daughter.          Leonie Steele   HOspitalist   [phone]/TEAMS

## 2022-09-28 NOTE — DIETITIAN NUTRITION RISK NOTIFICATION - TREATMENT: THE FOLLOWING DIET HAS BEEN RECOMMENDED
Diet, Regular:   Low Sodium  Supplement Feeding Modality:  Oral  Ensure Enlive Cans or Servings Per Day:  1       Frequency:  Two Times a day (09-28-22 @ 12:27) [Pending Verification By Attending]  Diet, DASH/TLC:   Sodium & Cholesterol Restricted (09-26-22 @ 18:29) [Active]

## 2022-09-28 NOTE — PROGRESS NOTE ADULT - PROBLEM SELECTOR PLAN 2
D dimer elevated to 280, however, patient is non-hypoxic or tachypneic, denies any chest pain, and has no lower extremity pain/erythema   Further, patient is currently on Eliquis for therapeutic AC i/s/o afib, and has reason for elevated d dimer in setting of cancer   - Per Onc, no need to pursue CT chest with contrast to evaluate for PE - History of DLBCL (tx with R-CHOP in 2003, with relapse in 2009 treated with BR) now with recently diagnosed grade 3 follicular lymphoma on treatment with a modified regimen of mini-COEP plus Obinutuzumab  - Hematology consulted. Appreciate recs   - C1 Started on 9/1/22 (cycle length q21 days) but full dose obinutuzumab was started 9/2/22. Second dose on 9/9/22  - He was due for his 3rd weekly dose of obinutuzumab of cycle 1 (9/16/22) but now on hold, pending further workup of encephalopathy  - requires a new appointment for mediport placement outpatient - per IR 9/28 mediport outpatient, however daughter does not want PICC, therefore oncology to discuss which line to place with daughter prior to initiating chemotherapy tomorrow

## 2022-09-28 NOTE — PROGRESS NOTE ADULT - PROBLEM SELECTOR PLAN 1
- Daughter notes that patient is A&Ox3 at baseline but had episodes of confusion at times, mostly during last hospitalization.   - Etiology may be underlying dementia, delirium, vs drug induced i/s/o chemotherapy vs metabolic causes vs neurological vs malignancy  - CTH stable chronic infarct without acute findings  - New cough and RVP positive for rhino/enteroviral pneumonia is c/w viral pneumonia which may be contributory.   - Also consider CNS involvement in lymphoma with leptomeningeal disease or side effects from the medications     Plan:  - Supportive care of rhino-enteroviral pneumonia  - CXR with loculated effusion minimally increased from 08/23. However, not seen on CXR from 09/2022.   - CT chest with no acute findings   - infectious workup negative    - Check syphilis screen, TSH, B12: all normal   - MRI with chronic right posterior parietal stroke without significant interval change.   - EEG with Mild nonspecific diffuse cerebral dysfunction; No overt asymmetry despite known lesion; There were no epileptiform abnormalities recorded.    - LP done today, restart Eliquis after 72d hours  - Per onc possible inpatient chemo - Daughter notes that patient is A&Ox3 at baseline but had episodes of confusion at times, mostly during last hospitalization.   - Etiology may be underlying dementia, delirium, vs drug induced i/s/o chemotherapy vs metabolic causes vs neurological vs malignancy  - CTH stable chronic infarct without acute findings  - Consider CNS involvement in lymphoma with leptomeningeal disease or side effects from the medications     Plan:  - Supportive care of rhino-enteroviral pneumonia  - CXR with loculated effusion minimally increased from 08/23. However, not seen on CXR from 09/2022.   - CT chest with no acute findings   - infectious workup negative    - Check syphilis screen, TSH, B12: all normal   - MRI with chronic right posterior parietal stroke without significant interval change.   - EEG with Mild nonspecific diffuse cerebral dysfunction; No overt asymmetry despite known lesion; There were no epileptiform abnormalities recorded.   - LP with lymphoid cells    - LP done today, restart Eliquis after 72d hours  - Per onc possible inpatient chemo

## 2022-09-28 NOTE — DIETITIAN INITIAL EVALUATION ADULT - ADD RECOMMEND
1) Recommend liberalizing to low sodium diet; defer consistency to SLP and Team   2) Add Ensure Enlive BID   3) If deemed medically feasible, continue multivitamin   4) Monitor PO intake, diet tolerance, weight trends, labs, GI function, and skin integrity  5) Malnutrition sticker placed

## 2022-09-28 NOTE — DIETITIAN INITIAL EVALUATION ADULT - ETIOLOGY
related to increased physiological demand  related to inadequate protein-energy intake in the setting of hx of DLBCL, active lymphoma and impaired memory

## 2022-09-28 NOTE — DIETITIAN INITIAL EVALUATION ADULT - POUNDS LOST/GAINED
23
Breathing spontaneous and unlabored. Breath sounds clear and equal bilaterally with regular rhythm.

## 2022-09-28 NOTE — PROGRESS NOTE ADULT - ASSESSMENT
72M hx DLBCL (tx with R-CHOP in 2003, with relapse of DLBCL in 2009 treated with BR) now with multiple areas of palpable lymphadenopathy with biopsies shown to be relapse of DLBCL. Recent hospitalization for hemolysis due to G6PD deficiency and rasburicase. Had been started on O-COEP last admission. Now presenting with encephalopathy.    #Encephalopathy  - Workup for metabolic causes has been negative thus far  - entero/rhinovirus+ but rest of infectious workup neg  - MRI brain without acute pathology  - EEG neg for seizures  - s/p LP, flow cytometry insufficient cells to report  - appreciate neuro recs  - based on above, does not appear to have gross CNS involvement by DLBCL    #Relapsed Diffuse Large B-Cell Lymphoma  Follows with Dr. Cordova at [facility]. Originally diagnosed in 2003 s/p RCHOP with relapse in 2009 s/p BR. 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 During last admission, had excisional biopsy but was performed after starting treatment.  - s/p excisional biopsy of back lesion on 9/7 showing DLBCL  - Patient is on treatment with a modified regimen of mini-COEP plus Obinutuzumab. C1 Started on 9/1/22 (cycle length q21 days) but full dose obinutuzumab was started 9/2/22.  - plan on treating with C2 O-mini-COEP 09/29  - obi 1000mg D1  - cyclophosphamide 400mg/m2 D1  - vincristine 2mg flat dose D1  - etoposide 30mg/m2 IV D1, 60mg/m2 PO D2,3  - prednisone 100mg PO daily D1-5  - will need outpatient GCSF appt on D4  - outpatient f/u with Dr. Cordova at [facility]     72M hx DLBCL (tx with R-CHOP in 2003, with relapse of DLBCL in 2009 treated with BR) now with multiple areas of palpable lymphadenopathy with biopsies shown to be relapse of DLBCL. Recent hospitalization for hemolysis due to G6PD deficiency and rasburicase. Had been started on O-COEP last admission. Now presenting with encephalopathy.    #Encephalopathy  - Workup for metabolic causes has been negative thus far  - entero/rhinovirus+ but rest of infectious workup neg  - MRI brain without acute pathology  - EEG neg for seizures  - s/p LP, flow cytometry insufficient cells to report  - appreciate neuro recs  - based on above, does not appear to have gross CNS involvement by DLBCL    #Relapsed Diffuse Large B-Cell Lymphoma  Follows with Dr. Cordova at [facility]. Originally diagnosed in 2003 s/p RCHOP with relapse in 2009 s/p BR. 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 During last admission, had excisional biopsy but was performed after starting treatment.  - s/p excisional biopsy of back lesion on 9/7 showing DLBCL  - Patient is on treatment with a modified regimen of mini-COEP plus Obinutuzumab. C1 Started on 9/1/22 (cycle length q21 days) but full dose obinutuzumab was started 9/2/22.  - plan on treating with C2 O-mini-COEP 09/29  - obi 1000mg D1  - cyclophosphamide 400mg/m2 D1  - vincristine 2mg flat dose D1  - etoposide 30mg/m2 IV D1, 60mg/m2 PO D2,3  - prednisone 100mg PO daily D1-5  - will need outpatient GCSF appt on D4  - will need central access (PICC, mediport) placed prior to chemo  - outpatient f/u with Dr. Cordova at [facility]

## 2022-09-28 NOTE — PROGRESS NOTE ADULT - PROBLEM SELECTOR PLAN 5
- Appears overall euvolemic on exam   - Pro-BNP 29887 <- 50830 (from last admission), troponin 41   - HFrEF with TTE from 09/2022 with EF 30%, severe global left ventricular systolic dysfunction. Mild RV enlargement with decreased RV systolic function    Plan:   - Continue home medications with hold parameters   - Metoprolol succinate 100 mg PO QD  - Entresto 49/51 PO BID with hold parameters   - spironolactone 25 mg PO QD  - Consider SGLT2 as outpatient

## 2022-09-28 NOTE — PROGRESS NOTE ADULT - PROBLEM SELECTOR PLAN 3
Patient with elevated ALK Phos on admission, but denies and RUQ pain or postprandial pain  - RUQ ultrasound to evaluate for biliary changes: Cholelithiasis with gallbladder sludge. Gallbladder wall thickening with intramural edema.  - HIDA scan with no findings of cholecystitis- Normal hepatobiliary scan. No radionuclide evidence of acute   cholecystitis.  - hepatitis labs non reactive   - consider MRI abdomen if alk phos continue to up-trend   - CTM for pain  - Documented as side effect of the Obinutuzumab D dimer elevated to 280, however, patient is non-hypoxic or tachypneic, denies any chest pain, and has no lower extremity pain/erythema   Further, patient is currently on Eliquis for therapeutic AC i/s/o afib, and has reason for elevated d dimer in setting of cancer   - Per Onc, no need to pursue CT chest with contrast to evaluate for PE

## 2022-09-29 LAB
ALBUMIN SERPL ELPH-MCNC: 3.6 G/DL — SIGNIFICANT CHANGE UP (ref 3.3–5)
ALP SERPL-CCNC: 198 U/L — HIGH (ref 40–120)
ALT FLD-CCNC: 18 U/L — SIGNIFICANT CHANGE UP (ref 10–45)
ANION GAP SERPL CALC-SCNC: 10 MMOL/L — SIGNIFICANT CHANGE UP (ref 5–17)
AST SERPL-CCNC: 15 U/L — SIGNIFICANT CHANGE UP (ref 10–40)
B BURGDOR DNA SPEC QL NAA+PROBE: NEGATIVE — SIGNIFICANT CHANGE UP
BILIRUB SERPL-MCNC: 0.6 MG/DL — SIGNIFICANT CHANGE UP (ref 0.2–1.2)
BUN SERPL-MCNC: 19 MG/DL — SIGNIFICANT CHANGE UP (ref 7–23)
CALCIUM SERPL-MCNC: 9 MG/DL — SIGNIFICANT CHANGE UP (ref 8.4–10.5)
CHLORIDE SERPL-SCNC: 106 MMOL/L — SIGNIFICANT CHANGE UP (ref 96–108)
CO2 SERPL-SCNC: 26 MMOL/L — SIGNIFICANT CHANGE UP (ref 22–31)
CREAT SERPL-MCNC: 1 MG/DL — SIGNIFICANT CHANGE UP (ref 0.5–1.3)
CULTURE RESULTS: NO GROWTH — SIGNIFICANT CHANGE UP
EBV PCR: SIGNIFICANT CHANGE UP IU/ML
EGFR: 80 ML/MIN/1.73M2 — SIGNIFICANT CHANGE UP
GLUCOSE BLDC GLUCOMTR-MCNC: 88 MG/DL — SIGNIFICANT CHANGE UP (ref 70–99)
GLUCOSE SERPL-MCNC: 93 MG/DL — SIGNIFICANT CHANGE UP (ref 70–99)
HCT VFR BLD CALC: 33.4 % — LOW (ref 39–50)
HGB BLD-MCNC: 10 G/DL — LOW (ref 13–17)
JCPYV DNA # CSF NAA+PROBE: SIGNIFICANT CHANGE UP COPIES/ML
MAGNESIUM SERPL-MCNC: 1.8 MG/DL — SIGNIFICANT CHANGE UP (ref 1.6–2.6)
MCHC RBC-ENTMCNC: 26.1 PG — LOW (ref 27–34)
MCHC RBC-ENTMCNC: 29.9 GM/DL — LOW (ref 32–36)
MCV RBC AUTO: 87.2 FL — SIGNIFICANT CHANGE UP (ref 80–100)
NRBC # BLD: 0 /100 WBCS — SIGNIFICANT CHANGE UP (ref 0–0)
PHOSPHATE SERPL-MCNC: 3 MG/DL — SIGNIFICANT CHANGE UP (ref 2.5–4.5)
PLATELET # BLD AUTO: 267 K/UL — SIGNIFICANT CHANGE UP (ref 150–400)
POTASSIUM SERPL-MCNC: 3.6 MMOL/L — SIGNIFICANT CHANGE UP (ref 3.5–5.3)
POTASSIUM SERPL-SCNC: 3.6 MMOL/L — SIGNIFICANT CHANGE UP (ref 3.5–5.3)
PROT SERPL-MCNC: 7 G/DL — SIGNIFICANT CHANGE UP (ref 6–8.3)
RBC # BLD: 3.83 M/UL — LOW (ref 4.2–5.8)
RBC # FLD: 20.6 % — HIGH (ref 10.3–14.5)
SARS-COV-2 RNA SPEC QL NAA+PROBE: SIGNIFICANT CHANGE UP
SODIUM SERPL-SCNC: 142 MMOL/L — SIGNIFICANT CHANGE UP (ref 135–145)
SPECIMEN SOURCE: SIGNIFICANT CHANGE UP
VDRL CSF-TITR: SIGNIFICANT CHANGE UP
WBC # BLD: 7.14 K/UL — SIGNIFICANT CHANGE UP (ref 3.8–10.5)
WBC # FLD AUTO: 7.14 K/UL — SIGNIFICANT CHANGE UP (ref 3.8–10.5)

## 2022-09-29 PROCEDURE — 99232 SBSQ HOSP IP/OBS MODERATE 35: CPT | Mod: GC

## 2022-09-29 PROCEDURE — 71045 X-RAY EXAM CHEST 1 VIEW: CPT | Mod: 26

## 2022-09-29 PROCEDURE — 93010 ELECTROCARDIOGRAM REPORT: CPT

## 2022-09-29 RX ORDER — VALPROIC ACID (AS SODIUM SALT) 250 MG/5ML
125 SOLUTION, ORAL ORAL ONCE
Refills: 0 | Status: DISCONTINUED | OUTPATIENT
Start: 2022-09-29 | End: 2022-10-03

## 2022-09-29 RX ORDER — APIXABAN 2.5 MG/1
5 TABLET, FILM COATED ORAL EVERY 12 HOURS
Refills: 0 | Status: DISCONTINUED | OUTPATIENT
Start: 2022-09-29 | End: 2022-10-03

## 2022-09-29 RX ORDER — APIXABAN 2.5 MG/1
5 TABLET, FILM COATED ORAL EVERY 12 HOURS
Refills: 0 | Status: DISCONTINUED | OUTPATIENT
Start: 2022-09-29 | End: 2022-09-29

## 2022-09-29 RX ADMIN — SACUBITRIL AND VALSARTAN 1 TABLET(S): 24; 26 TABLET, FILM COATED ORAL at 05:28

## 2022-09-29 RX ADMIN — ATORVASTATIN CALCIUM 40 MILLIGRAM(S): 80 TABLET, FILM COATED ORAL at 22:05

## 2022-09-29 RX ADMIN — Medication 3 MILLILITER(S): at 12:17

## 2022-09-29 RX ADMIN — Medication 100 MILLIGRAM(S): at 05:29

## 2022-09-29 RX ADMIN — Medication 1 TABLET(S): at 12:16

## 2022-09-29 RX ADMIN — Medication 400 MILLIGRAM(S): at 17:30

## 2022-09-29 RX ADMIN — SACUBITRIL AND VALSARTAN 1 TABLET(S): 24; 26 TABLET, FILM COATED ORAL at 17:31

## 2022-09-29 RX ADMIN — Medication 3 MILLILITER(S): at 18:31

## 2022-09-29 RX ADMIN — APIXABAN 5 MILLIGRAM(S): 2.5 TABLET, FILM COATED ORAL at 18:31

## 2022-09-29 RX ADMIN — SPIRONOLACTONE 25 MILLIGRAM(S): 25 TABLET, FILM COATED ORAL at 12:17

## 2022-09-29 RX ADMIN — Medication 3 MILLIGRAM(S): at 22:05

## 2022-09-29 RX ADMIN — Medication 100 MILLIGRAM(S): at 12:16

## 2022-09-29 RX ADMIN — Medication 400 MILLIGRAM(S): at 05:29

## 2022-09-29 NOTE — PROVIDER CONTACT NOTE (OTHER) - ASSESSMENT
Pt denies chest pain. Calm, sitting in bed comfortably. A+Ox4.
Patient restless and threatening to leave the hospital
Patient did not take his 6am morning medications. No acute distress noted
Patient is in a calm and resting in bed.
Pt appears restless, ao x2/3, refusing blood work and morning medications.

## 2022-09-29 NOTE — PROGRESS NOTE ADULT - PROBLEM SELECTOR PLAN 6
- History of afib w/ CHB s/p Micra pacemaker then s/p MDT CRT-P upgrade 9/30/19  - v paced on EKG, HR 74, Qtc 499     Plan:  - Continue metoprolol succinate 100 mg PO QD  - Restart Eliquis 48 hrs after LP - History of afib w/ CHB s/p Micra pacemaker then s/p MDT CRT-P upgrade 9/30/19  - v paced on EKG, HR 74, Qtc 499     Plan:  - Continue metoprolol succinate 100 mg PO QD  - Restart Eliquis 9/29

## 2022-09-29 NOTE — PROVIDER CONTACT NOTE (OTHER) - REASON
Pt ptt results
Heparin Drip
Pt refusing blood work APTT and morning medications
Abnormal EKG
Blood pressure

## 2022-09-29 NOTE — PROGRESS NOTE ADULT - ASSESSMENT
72 year old male with afib (on eliquis), HTN,  complete heart block s/p PPM, HLD, HFrEF (30% in 09/2022), and DLBCL (tx with R-CHOP in 2003, with relapse in 2009 treated with BR) now with recently diagnosed grade 3 follicular lymphoma who was transferred from [facility] for acute on chronic encephalopathy likely 2/2 rhino/entero viral pneumonia i/s/o follicular lymphoma and ongoing chemotherapy treatment. Possible etiologies include drug induced 2/2 chemo vs autoimmune vs paraneoplastic.

## 2022-09-29 NOTE — PROVIDER CONTACT NOTE (OTHER) - ACTION/TREATMENT ORDERED:
Dr. Del Cid gave patient his morning medications. Observation continues.
None ordered at this time. MD will check EKG in chart
Contacted Provider, Provider came to unit to speak with pt and Pt agreed to blood work to be performed but refused morning medications.
MD made aware. Heparin gtt increased to 16 units and a bolus of 6000units given to patient.
Patient instructed of the importance of getting heparin for A-fib. and cannot take po blood thinner because of procedure tomorrow. Observation continues

## 2022-09-29 NOTE — PROGRESS NOTE ADULT - ATTENDING COMMENTS
Plan for inpatient CHemo as per Onc team , onc team requests for IV access and I have called Patient's daughter , Vonnie Skaggs who is now agreable for a PICC line and she requests for a  Port which IR team states that it is not done inpatient due to high risk of infection .   THe onc team has reached out  to daughter         Leonie Steele   HOspitalist   [phone]/TEAMS

## 2022-09-29 NOTE — PROGRESS NOTE ADULT - PROBLEM SELECTOR PLAN 1
- Daughter notes that patient is A&Ox3 at baseline but had episodes of confusion at times, mostly during last hospitalization.   - Etiology may be underlying dementia, delirium, vs drug induced i/s/o chemotherapy vs metabolic causes vs neurological vs malignancy  - CTH stable chronic infarct without acute findings  - Consider CNS involvement in lymphoma with leptomeningeal disease or side effects from the medications     Plan:  - Supportive care of rhino-enteroviral pneumonia  - CXR with loculated effusion minimally increased from 08/23. However, not seen on CXR from 09/2022.   - CT chest with no acute findings   - infectious workup negative    - Check syphilis screen, TSH, B12: all normal   - MRI with chronic right posterior parietal stroke without significant interval change.   - EEG with Mild nonspecific diffuse cerebral dysfunction; No overt asymmetry despite known lesion; There were no epileptiform abnormalities recorded.   - LP with lymphoid cells    - LP done today, restart Eliquis after 72d hours  - Per onc possible inpatient chemo - Daughter notes that patient is A&Ox3 at baseline but had episodes of confusion at times, mostly during last hospitalization.   - Etiology may be underlying dementia, delirium, vs drug induced i/s/o chemotherapy vs metabolic causes vs neurological vs malignancy  - CTH stable chronic infarct without acute findings  - Consider CNS involvement in lymphoma with leptomeningeal disease or side effects from the medications     Plan:  - Supportive care of rhino-enteroviral pneumonia  - CXR with loculated effusion minimally increased from 08/23. However, not seen on CXR from 09/2022.   - CT chest with no acute findings   - infectious workup negative    - Check syphilis screen, TSH, B12: all normal   - MRI with chronic right posterior parietal stroke without significant interval change.   - EEG with Mild nonspecific diffuse cerebral dysfunction; No overt asymmetry despite known lesion; There were no epileptiform abnormalities recorded.   - LP with lymphoid cells    - LP done today, restart Eliquis after 72d hours  - PICC line placement 9/29 to start inpt chemo tx

## 2022-09-29 NOTE — PROGRESS NOTE ADULT - PROBLEM SELECTOR PLAN 7
DIET: Dash/TLC  DVT Prophylaxis: Eliquis 5 mg BID on hold for 72 hours post LP  Dispo: Pending    Discussed with daughter, Vonnie regarding plan of care. DIET: Dash/TLC  DVT Prophylaxis: Eliquis 5 mg BID restarted 9/29   Dispo: Pending    Discussed with daughterVonnie regarding plan of care.

## 2022-09-29 NOTE — PROVIDER CONTACT NOTE (OTHER) - SITUATION
Abnormal EKG
Patient's blood pressure 160/96, HR 94, above MD at bedside. Patient refusing medication and blood sugar finger stick ordered for am.
Patient clamped Heparin drip and came off the IVL. Patient refuses to be connected again.
Patient on heparin drip PTT result is 25.8.
Pt AOx2/3, refusing blood work for APTT for Heparin. Pt refusing all morning medications.

## 2022-09-29 NOTE — PROGRESS NOTE ADULT - PROBLEM SELECTOR PLAN 5
- Appears overall euvolemic on exam   - Pro-BNP 08361 <- 11566 (from last admission), troponin 41   - HFrEF with TTE from 09/2022 with EF 30%, severe global left ventricular systolic dysfunction. Mild RV enlargement with decreased RV systolic function    Plan:   - Continue home medications with hold parameters   - Metoprolol succinate 100 mg PO QD  - Entresto 49/51 PO BID with hold parameters   - spironolactone 25 mg PO QD  - Consider SGLT2 as outpatient

## 2022-09-29 NOTE — PROGRESS NOTE ADULT - PROBLEM SELECTOR PLAN 2
- History of DLBCL (tx with R-CHOP in 2003, with relapse in 2009 treated with BR) now with recently diagnosed grade 3 follicular lymphoma on treatment with a modified regimen of mini-COEP plus Obinutuzumab  - Hematology consulted. Appreciate recs   - C1 Started on 9/1/22 (cycle length q21 days) but full dose obinutuzumab was started 9/2/22. Second dose on 9/9/22  - He was due for his 3rd weekly dose of obinutuzumab of cycle 1 (9/16/22) but now on hold, pending further workup of encephalopathy  - requires a new appointment for mediport placement outpatient - per IR 9/28 mediport outpatient, however daughter does not want PICC, therefore oncology to discuss which line to place with daughter prior to initiating chemotherapy tomorrow - History of DLBCL (tx with R-CHOP in 2003, with relapse in 2009 treated with BR) now with recently diagnosed grade 3 follicular lymphoma on treatment with a modified regimen of mini-COEP plus Obinutuzumab  - Hematology consulted. Appreciate recs   - C1 Started on 9/1/22 (cycle length q21 days) but full dose obinutuzumab was started 9/2/22. Second dose on 9/9/22  - He was due for his 3rd weekly dose of obinutuzumab of cycle 1 (9/16/22) but now on hold, pending further workup of encephalopathy  - per Onc (9/29), daughter amenable to PICC initiating chemotherapy after placement; mediport placement for outpt chemo tx after dc

## 2022-09-30 LAB
ALBUMIN SERPL ELPH-MCNC: 3.7 G/DL — SIGNIFICANT CHANGE UP (ref 3.3–5)
ALP SERPL-CCNC: 190 U/L — HIGH (ref 40–120)
ALT FLD-CCNC: 18 U/L — SIGNIFICANT CHANGE UP (ref 10–45)
ANION GAP SERPL CALC-SCNC: 9 MMOL/L — SIGNIFICANT CHANGE UP (ref 5–17)
AST SERPL-CCNC: 18 U/L — SIGNIFICANT CHANGE UP (ref 10–40)
BILIRUB SERPL-MCNC: 0.6 MG/DL — SIGNIFICANT CHANGE UP (ref 0.2–1.2)
BUN SERPL-MCNC: 22 MG/DL — SIGNIFICANT CHANGE UP (ref 7–23)
CALCIUM SERPL-MCNC: 9.3 MG/DL — SIGNIFICANT CHANGE UP (ref 8.4–10.5)
CHLORIDE SERPL-SCNC: 104 MMOL/L — SIGNIFICANT CHANGE UP (ref 96–108)
CO2 SERPL-SCNC: 28 MMOL/L — SIGNIFICANT CHANGE UP (ref 22–31)
CREAT SERPL-MCNC: 1.08 MG/DL — SIGNIFICANT CHANGE UP (ref 0.5–1.3)
EGFR: 73 ML/MIN/1.73M2 — SIGNIFICANT CHANGE UP
GLUCOSE BLDC GLUCOMTR-MCNC: 108 MG/DL — HIGH (ref 70–99)
GLUCOSE SERPL-MCNC: 95 MG/DL — SIGNIFICANT CHANGE UP (ref 70–99)
HCT VFR BLD CALC: 34.8 % — LOW (ref 39–50)
HGB BLD-MCNC: 10.3 G/DL — LOW (ref 13–17)
MAGNESIUM SERPL-MCNC: 1.8 MG/DL — SIGNIFICANT CHANGE UP (ref 1.6–2.6)
MBP CSF-MCNC: 4.3 NG/ML — SIGNIFICANT CHANGE UP (ref 0–5.4)
MCHC RBC-ENTMCNC: 26.4 PG — LOW (ref 27–34)
MCHC RBC-ENTMCNC: 29.6 GM/DL — LOW (ref 32–36)
MCV RBC AUTO: 89.2 FL — SIGNIFICANT CHANGE UP (ref 80–100)
NRBC # BLD: 0 /100 WBCS — SIGNIFICANT CHANGE UP (ref 0–0)
PHOSPHATE SERPL-MCNC: 3.1 MG/DL — SIGNIFICANT CHANGE UP (ref 2.5–4.5)
PLATELET # BLD AUTO: 234 K/UL — SIGNIFICANT CHANGE UP (ref 150–400)
POTASSIUM SERPL-MCNC: 4 MMOL/L — SIGNIFICANT CHANGE UP (ref 3.5–5.3)
POTASSIUM SERPL-SCNC: 4 MMOL/L — SIGNIFICANT CHANGE UP (ref 3.5–5.3)
PROT SERPL-MCNC: 7 G/DL — SIGNIFICANT CHANGE UP (ref 6–8.3)
RBC # BLD: 3.9 M/UL — LOW (ref 4.2–5.8)
RBC # FLD: 20.6 % — HIGH (ref 10.3–14.5)
SODIUM SERPL-SCNC: 141 MMOL/L — SIGNIFICANT CHANGE UP (ref 135–145)
WBC # BLD: 7.94 K/UL — SIGNIFICANT CHANGE UP (ref 3.8–10.5)
WBC # FLD AUTO: 7.94 K/UL — SIGNIFICANT CHANGE UP (ref 3.8–10.5)

## 2022-09-30 PROCEDURE — 99233 SBSQ HOSP IP/OBS HIGH 50: CPT | Mod: GC

## 2022-09-30 PROCEDURE — 99233 SBSQ HOSP IP/OBS HIGH 50: CPT

## 2022-09-30 RX ORDER — SACUBITRIL AND VALSARTAN 24; 26 MG/1; MG/1
1 TABLET, FILM COATED ORAL
Qty: 30 | Refills: 2
Start: 2022-09-30 | End: 2022-12-28

## 2022-09-30 RX ORDER — CYCLOPHOSPHAMIDE 100 MG
750 VIAL (EA) INTRAVENOUS ONCE
Refills: 0 | Status: COMPLETED | OUTPATIENT
Start: 2022-09-30 | End: 2022-09-30

## 2022-09-30 RX ORDER — VINCRISTINE SULFATE 1 MG/ML
2 VIAL (ML) INTRAVENOUS ONCE
Refills: 0 | Status: COMPLETED | OUTPATIENT
Start: 2022-09-30 | End: 2022-09-30

## 2022-09-30 RX ORDER — ETOPOSIDE 20 MG/ML
56 VIAL (ML) INTRAVENOUS ONCE
Refills: 0 | Status: COMPLETED | OUTPATIENT
Start: 2022-09-30 | End: 2022-09-30

## 2022-09-30 RX ORDER — SENNA PLUS 8.6 MG/1
2 TABLET ORAL
Qty: 60 | Refills: 0
Start: 2022-09-30 | End: 2022-10-29

## 2022-09-30 RX ORDER — ONDANSETRON 8 MG/1
8 TABLET, FILM COATED ORAL ONCE
Refills: 0 | Status: COMPLETED | OUTPATIENT
Start: 2022-09-30 | End: 2022-09-30

## 2022-09-30 RX ORDER — SPIRONOLACTONE 25 MG/1
1 TABLET, FILM COATED ORAL
Qty: 30 | Refills: 1
Start: 2022-09-30 | End: 2022-11-28

## 2022-09-30 RX ORDER — APIXABAN 2.5 MG/1
1 TABLET, FILM COATED ORAL
Qty: 60 | Refills: 0
Start: 2022-09-30 | End: 2022-10-29

## 2022-09-30 RX ORDER — DEXAMETHASONE 0.5 MG/5ML
20 ELIXIR ORAL ONCE
Refills: 0 | Status: COMPLETED | OUTPATIENT
Start: 2022-09-30 | End: 2022-09-30

## 2022-09-30 RX ORDER — OBINUTUZUMAB 1000 MG/40ML
1000 INJECTION, SOLUTION, CONCENTRATE INTRAVENOUS ONCE
Refills: 0 | Status: COMPLETED | OUTPATIENT
Start: 2022-09-30 | End: 2022-09-30

## 2022-09-30 RX ORDER — ATORVASTATIN CALCIUM 80 MG/1
1 TABLET, FILM COATED ORAL
Qty: 30 | Refills: 0
Start: 2022-09-30 | End: 2022-10-29

## 2022-09-30 RX ORDER — ACYCLOVIR SODIUM 500 MG
1 VIAL (EA) INTRAVENOUS
Qty: 60 | Refills: 0
Start: 2022-09-30 | End: 2022-10-29

## 2022-09-30 RX ORDER — METOPROLOL TARTRATE 50 MG
1 TABLET ORAL
Qty: 30 | Refills: 0
Start: 2022-09-30 | End: 2022-10-29

## 2022-09-30 RX ORDER — FOSAPREPITANT DIMEGLUMINE 150 MG/5ML
150 INJECTION, POWDER, LYOPHILIZED, FOR SOLUTION INTRAVENOUS ONCE
Refills: 0 | Status: COMPLETED | OUTPATIENT
Start: 2022-09-30 | End: 2022-09-30

## 2022-09-30 RX ORDER — ACETAMINOPHEN 500 MG
650 TABLET ORAL ONCE
Refills: 0 | Status: COMPLETED | OUTPATIENT
Start: 2022-09-30 | End: 2022-09-30

## 2022-09-30 RX ORDER — DIPHENHYDRAMINE HCL 50 MG
50 CAPSULE ORAL ONCE
Refills: 0 | Status: COMPLETED | OUTPATIENT
Start: 2022-09-30 | End: 2022-09-30

## 2022-09-30 RX ORDER — ALLOPURINOL 300 MG
1 TABLET ORAL
Qty: 30 | Refills: 1
Start: 2022-09-30 | End: 2022-11-28

## 2022-09-30 RX ADMIN — Medication 400 MILLIGRAM(S): at 05:10

## 2022-09-30 RX ADMIN — Medication 650 MILLIGRAM(S): at 11:22

## 2022-09-30 RX ADMIN — SACUBITRIL AND VALSARTAN 1 TABLET(S): 24; 26 TABLET, FILM COATED ORAL at 05:10

## 2022-09-30 RX ADMIN — APIXABAN 5 MILLIGRAM(S): 2.5 TABLET, FILM COATED ORAL at 05:10

## 2022-09-30 RX ADMIN — Medication 400 MILLIGRAM(S): at 17:35

## 2022-09-30 RX ADMIN — Medication 750 MILLIGRAM(S): at 16:15

## 2022-09-30 RX ADMIN — Medication 3 MILLILITER(S): at 05:11

## 2022-09-30 RX ADMIN — Medication 100 MILLIGRAM(S): at 05:11

## 2022-09-30 RX ADMIN — Medication 100 MILLIGRAM(S): at 12:51

## 2022-09-30 RX ADMIN — Medication 3 MILLILITER(S): at 12:52

## 2022-09-30 RX ADMIN — Medication 56 MILLIGRAM(S): at 17:29

## 2022-09-30 RX ADMIN — SACUBITRIL AND VALSARTAN 1 TABLET(S): 24; 26 TABLET, FILM COATED ORAL at 17:36

## 2022-09-30 RX ADMIN — FOSAPREPITANT DIMEGLUMINE 300 MILLIGRAM(S): 150 INJECTION, POWDER, LYOPHILIZED, FOR SOLUTION INTRAVENOUS at 15:46

## 2022-09-30 RX ADMIN — SPIRONOLACTONE 25 MILLIGRAM(S): 25 TABLET, FILM COATED ORAL at 12:52

## 2022-09-30 RX ADMIN — Medication 100 MILLIGRAM(S): at 17:35

## 2022-09-30 RX ADMIN — Medication 50 MILLIGRAM(S): at 11:21

## 2022-09-30 RX ADMIN — ATORVASTATIN CALCIUM 40 MILLIGRAM(S): 80 TABLET, FILM COATED ORAL at 22:21

## 2022-09-30 RX ADMIN — Medication 3 MILLIGRAM(S): at 22:21

## 2022-09-30 RX ADMIN — APIXABAN 5 MILLIGRAM(S): 2.5 TABLET, FILM COATED ORAL at 17:35

## 2022-09-30 RX ADMIN — OBINUTUZUMAB 1000 MILLIGRAM(S): 1000 INJECTION, SOLUTION, CONCENTRATE INTRAVENOUS at 12:15

## 2022-09-30 RX ADMIN — Medication 2 MILLIGRAM(S): at 18:31

## 2022-09-30 RX ADMIN — Medication 110 MILLIGRAM(S): at 11:22

## 2022-09-30 RX ADMIN — Medication 1 TABLET(S): at 12:51

## 2022-09-30 RX ADMIN — ONDANSETRON 8 MILLIGRAM(S): 8 TABLET, FILM COATED ORAL at 15:46

## 2022-09-30 NOTE — PROGRESS NOTE ADULT - ATTENDING COMMENTS
72M hx DLBCL (tx with R-CHOP in 2003, with relapse of DLBCL in 2009 treated with BR) now with multiple areas of palpable lymphadenopathy with biopsies shown to be relapse of DLBCL. Recent hospitalization for hemolysis due to G6PD deficiency and rasburicase. Had been started on O-COEP last admission. Now presenting with encephalopathy.      #Relapsed Diffuse Large B-Cell Lymphoma  - s/p excisional biopsy of back lesion on 9/7 showing DLBCL  - Patient is on treatment with a modified regimen of mini-COEP plus Obinutuzumab. C1 Started on 9/1/22 (cycle length q21 days) but full dose obinutuzumab was started 9/2/22.  - plan on treating with C2 O-mini-COEP 09/30  - will need outpatient [facility] appt on D4  - patient has a Neulasta appt at [facility] on 10/03 at 2pm

## 2022-09-30 NOTE — PROGRESS NOTE ADULT - SUBJECTIVE AND OBJECTIVE BOX
Internal Medicine   Gisele Niya | PGY-1    OVERNIGHT EVENTS: No acute overnight events.    SUBJECTIVE:       MEDICATIONS  (STANDING):  acyclovir   Oral Tab/Cap 400 milliGRAM(s) Oral two times a day  albuterol/ipratropium for Nebulization 3 milliLiter(s) Nebulizer every 6 hours  allopurinol 100 milliGRAM(s) Oral daily  apixaban 5 milliGRAM(s) Oral every 12 hours  atorvastatin 40 milliGRAM(s) Oral at bedtime  melatonin 3 milliGRAM(s) Oral at bedtime  metoprolol succinate  milliGRAM(s) Oral daily  multivitamin 1 Tablet(s) Oral daily  sacubitril 49 mG/valsartan 51 mG 1 Tablet(s) Oral two times a day  spironolactone 25 milliGRAM(s) Oral <User Schedule>    MEDICATIONS  (PRN):  guaiFENesin Oral Liquid (Sugar-Free) 100 milliGRAM(s) Oral every 6 hours PRN Cough  senna 2 Tablet(s) Oral at bedtime PRN Constipation  valproate sodium Injectable. 125 milliGRAM(s) IV Push once PRN Agitation at night        T(F): 98.2 (09-30-22 @ 04:53), Max: 98.2 (09-30-22 @ 04:53)  HR: 94 (09-30-22 @ 04:53) (73 - 94)  BP: 147/91 (09-30-22 @ 04:53) (132/71 - 147/91)  BP(mean): --  RR: 18 (09-30-22 @ 04:53) (18 - 18)  SpO2: 94% (09-30-22 @ 04:53) (94% - 98%)    PHYSICAL EXAM:     GENERAL: NAD, lying in bed comfortably  HEAD:  Atraumatic, Normocephalic  EYES: EOMI, PERRLA, conjunctiva and sclera clear, no nystagmus noted  ENT: Moist mucous membranes,   NECK: Supple, No JVD, trachea midline  CHEST/LUNG: Clear to auscultation bilaterally; No rales, rhonchi, wheezing, or rubs. Unlabored respirations  HEART: Regular rate and rhythm; No murmurs, rubs, or gallops, normal S1/S2  ABDOMEN: normal bowel sounds; Soft, nontender, nondistended, no organomegaly   EXTREMITIES:  2+ Peripheral Pulses, brisk capillary refill. No clubbing, cyanosis, or edema  MSK: No gross deformities noted   Neurological:  A&Ox3, no focal deficits   SKIN: No rashes or lesions  PSYCH: Normal mood, affect     TELEMETRY:    LABS:                        10.0   7.14  )-----------( 267      ( 29 Sep 2022 07:32 )             33.4     09-29    142  |  106  |  19  ----------------------------<  93  3.6   |  26  |  1.00    Ca    9.0      29 Sep 2022 07:32  Phos  3.0     09-29  Mg     1.8     09-29    TPro  7.0  /  Alb  3.6  /  TBili  0.6  /  DBili  x   /  AST  15  /  ALT  18  /  AlkPhos  198<H>  09-29            Creatinine Trend: 1.00<--, 1.08<--, 1.12<--, 1.04<--, 1.09<--, 1.16<--  I&O's Summary    BNP    RADIOLOGY & ADDITIONAL STUDIES:             Internal Medicine   Gisele Niya | PGY-1    OVERNIGHT EVENTS: No acute overnight events.    SUBJECTIVE: Patient was seen and examined at bedside this morning. Denies any nausea/vomiting/diarrhea, headache, shortness of breath, abdominal pain or chest pain/palpitations. Pt AOx1. Vital signs/imaging/telemetry events reviewed.       MEDICATIONS  (STANDING):  acyclovir   Oral Tab/Cap 400 milliGRAM(s) Oral two times a day  albuterol/ipratropium for Nebulization 3 milliLiter(s) Nebulizer every 6 hours  allopurinol 100 milliGRAM(s) Oral daily  apixaban 5 milliGRAM(s) Oral every 12 hours  atorvastatin 40 milliGRAM(s) Oral at bedtime  melatonin 3 milliGRAM(s) Oral at bedtime  metoprolol succinate  milliGRAM(s) Oral daily  multivitamin 1 Tablet(s) Oral daily  sacubitril 49 mG/valsartan 51 mG 1 Tablet(s) Oral two times a day  spironolactone 25 milliGRAM(s) Oral <User Schedule>    MEDICATIONS  (PRN):  guaiFENesin Oral Liquid (Sugar-Free) 100 milliGRAM(s) Oral every 6 hours PRN Cough  senna 2 Tablet(s) Oral at bedtime PRN Constipation  valproate sodium Injectable. 125 milliGRAM(s) IV Push once PRN Agitation at night        T(F): 98.2 (09-30-22 @ 04:53), Max: 98.2 (09-30-22 @ 04:53)  HR: 94 (09-30-22 @ 04:53) (73 - 94)  BP: 147/91 (09-30-22 @ 04:53) (132/71 - 147/91)  BP(mean): --  RR: 18 (09-30-22 @ 04:53) (18 - 18)  SpO2: 94% (09-30-22 @ 04:53) (94% - 98%)    PHYSICAL EXAM:     GENERAL: NAD, lying in bed comfortably  HEAD:  Atraumatic, Normocephalic  EYES: EOMI, PERRLA, conjunctiva and sclera clear, no nystagmus noted  ENT: Moist mucous membranes,   NECK: Supple, No JVD, trachea midline  CHEST/LUNG: Clear to auscultation bilaterally; No rales, rhonchi, wheezing, or rubs. Unlabored respirations  HEART: Regular rate and rhythm; No murmurs, rubs, or gallops, normal S1/S2  ABDOMEN: normal bowel sounds; Soft, nontender, nondistended, no organomegaly   EXTREMITIES:  +PICC line RUE; 2+ Peripheral Pulses, brisk capillary refill. No clubbing, cyanosis, or edema  MSK: No gross deformities noted   Neurological:  A&Ox1, no focal deficits   SKIN: No rashes or lesions  PSYCH: Normal mood, affect     TELEMETRY:    LABS:                        10.0   7.14  )-----------( 267      ( 29 Sep 2022 07:32 )             33.4     09-29    142  |  106  |  19  ----------------------------<  93  3.6   |  26  |  1.00    Ca    9.0      29 Sep 2022 07:32  Phos  3.0     09-29  Mg     1.8     09-29    TPro  7.0  /  Alb  3.6  /  TBili  0.6  /  DBili  x   /  AST  15  /  ALT  18  /  AlkPhos  198<H>  09-29            Creatinine Trend: 1.00<--, 1.08<--, 1.12<--, 1.04<--, 1.09<--, 1.16<--  I&O's Summary    BNP    RADIOLOGY & ADDITIONAL STUDIES:

## 2022-09-30 NOTE — PROGRESS NOTE ADULT - ATTENDING COMMENTS
Patient is seen and is receiving CHemo via PICC Line and if no issues presented , patient will be DC home under the care of his daughter .            Leonie Steele   HOspitalist   /TEAMS 72 year old male with Afib (on eliquis), HTN,  complete heart block s/p PPM, HLD, HFrEF (30% in 09/2022), and DLBCL (tx with R-CHOP in 2003, with relapse in 2009 treated) now with recently diagnosed grade 3 follicular lymphoma who was transferred from [facility] for AMS. He presented for treatment at [facility] and  was later found wandering in the parking lot confused. Per daughter, in the morning of admisson, patient was somewhat agitated and slightly confused in AM and was reoriented.    In the ED, patient afebrile, HR 82, /74, RR 16 (96% on room air). CTH without acute findings, X-ray with mildly increased right loculated effusion. RVP + for rhino/enterovirus.      #DLBCL     appreciate Hem/onc rec---> No new MRI of brain ( chronic posterior parietal lobe ) no menigeal or white matter enhancement ,   mild   periventricular enhancement ( suggest microvascular changes) and  S/P LP on  9/26    CHemotherapy given by Onc on 9/30    daughter has been  updated of the plan and the goal is to REMOVE the PICC LINE prior to DC On 10/1    # Acute on CHronic Encephalopathy  ( Pt is S/P obinutuzumab)      Exacerbated by current viral  infection      COnt to closely monitor, daughter is told to that patient will need close monitoring at home for safety    # Enterovirus pneumonia       CW supportive care  / Antitussive meds and DUoneb as needed       Monitor SPO2       CT of chest with no evidence of consolidations        Daughter is Vonnie ( [phone] )       Leonie Steele   Hospitalist   [phone] /TEAMS

## 2022-09-30 NOTE — PROGRESS NOTE ADULT - PROBLEM SELECTOR PLAN 6
- History of afib w/ CHB s/p Micra pacemaker then s/p MDT CRT-P upgrade 9/30/19  - v paced on EKG, HR 74, Qtc 499     Plan:  - Continue metoprolol succinate 100 mg PO QD  - Restart Eliquis 9/29 - History of afib w/ CHB s/p Micra pacemaker then s/p MDT CRT-P upgrade 9/30/19  - v paced on EKG, HR 74, Qtc 499       - Continue metoprolol succinate 100 mg PO QD  - Restarted Eliquis 9/29

## 2022-09-30 NOTE — PROGRESS NOTE ADULT - PROBLEM SELECTOR PLAN 5
- Appears overall euvolemic on exam   - Pro-BNP 76765 <- 89206 (from last admission), troponin 41   - HFrEF with TTE from 09/2022 with EF 30%, severe global left ventricular systolic dysfunction. Mild RV enlargement with decreased RV systolic function    Plan:   - Continue home medications with hold parameters   - Metoprolol succinate 100 mg PO QD  - Entresto 49/51 PO BID with hold parameters   - spironolactone 25 mg PO QD  - Consider SGLT2 as outpatient

## 2022-09-30 NOTE — PROGRESS NOTE ADULT - PROBLEM SELECTOR PLAN 1
- Daughter notes that patient is A&Ox3 at baseline but had episodes of confusion at times, mostly during last hospitalization.   - Etiology may be underlying dementia, delirium, vs drug induced i/s/o chemotherapy vs metabolic causes vs neurological vs malignancy  - CTH stable chronic infarct without acute findings  - Consider CNS involvement in lymphoma with leptomeningeal disease or side effects from the medications     Plan:  - Supportive care of rhino-enteroviral pneumonia  - CXR with loculated effusion minimally increased from 08/23. However, not seen on CXR from 09/2022.   - CT chest with no acute findings   - infectious workup negative    - Check syphilis screen, TSH, B12: all normal   - MRI with chronic right posterior parietal stroke without significant interval change.   - EEG with Mild nonspecific diffuse cerebral dysfunction; No overt asymmetry despite known lesion; There were no epileptiform abnormalities recorded.   - LP with lymphoid cells    - LP done today, restart Eliquis after 72d hours  - PICC line placement 9/29 to start inpt chemo tx - Daughter notes that patient is A&Ox3 at baseline but had episodes of confusion at times, mostly during last hospitalization.   - Etiology may be underlying dementia, delirium, vs drug induced i/s/o chemotherapy vs metabolic causes vs neurological vs malignancy  - CTH stable chronic infarct without acute findings  - Consider CNS involvement in lymphoma with leptomeningeal disease or side effects from the medications     Plan:  - Supportive care of rhino-enteroviral pneumonia  - CXR with loculated effusion minimally increased from 08/23. However, not seen on CXR from 09/2022.   - CT chest with no acute findings   - infectious workup negative    - Check syphilis screen, TSH, B12: all normal   - MRI with chronic right posterior parietal stroke without significant interval change.   - EEG with Mild nonspecific diffuse cerebral dysfunction; No overt asymmetry despite known lesion; There were no epileptiform abnormalities recorded.   - LP with lymphoid cells    - Eliquis restarted 9/29  - PICC line placed 9/29; will receive chemo tx 9/30

## 2022-09-30 NOTE — PROGRESS NOTE ADULT - ASSESSMENT
72 year old male with afib (on eliquis), HTN,  complete heart block s/p PPM, HLD, HFrEF (30% in 09/2022), and DLBCL (tx with R-CHOP in 2003, with relapse in 2009 treated with BR) now with recently diagnosed grade 3 follicular lymphoma who was transferred from [facility] for acute on chronic encephalopathy likely 2/2 rhino/entero viral pneumonia i/s/o follicular lymphoma and ongoing chemotherapy treatment. Possible etiologies include drug induced 2/2 chemo vs autoimmune vs paraneoplastic.

## 2022-09-30 NOTE — PROGRESS NOTE ADULT - SUBJECTIVE AND OBJECTIVE BOX
Hematology Oncology Follow-up    INTERVAL HPI/OVERNIGHT EVENTS:  No o/n events, patient resting comfortably. No complaints at this time.    VITAL SIGNS:  T(F): 97.6 (09-30-22 @ 12:52)  HR: 93 (09-30-22 @ 12:52)  BP: 150/88 (09-30-22 @ 12:52)  RR: 16 (09-30-22 @ 12:52)  SpO2: 99% (09-30-22 @ 12:52)  Wt(kg): --    09-30-22 @ 07:01  -  09-30-22 @ 12:59  --------------------------------------------------------  IN: 120 mL / OUT: 0 mL / NET: 120 mL          Review of Systems:  General: denies fevers/chills  Respiratory: denies cough, shortness of breath  Cardiovascular: denies chest pain, palpitations  Gastrointestinal: denies nausea, vomiting, abdominal pain, constipation, diarrhea, melena, hematochezia  MSK: denies joint pain or muscle pain  Neuro: denies headache, weakness, or parasthesias  Skin: denies rash, petichiae, echymoses  Psych: denies anxiety or sleep disturbances    PHYSICAL EXAM:  Constitutional: NAD  Respiratory: symmetric chest rise, with normal respiratory effort  Cardiovascular: RRR  Gastrointestinal: soft, NTND  Extremities:  no edema  MSK: no obvious abnormalities  Neurological: AOx2   Skin: no rash, no echymoses, no petichiae  Psych: normal affect    MEDICATIONS  (STANDING):  acyclovir   Oral Tab/Cap 400 milliGRAM(s) Oral two times a day  albuterol/ipratropium for Nebulization 3 milliLiter(s) Nebulizer every 6 hours  allopurinol 100 milliGRAM(s) Oral daily  apixaban 5 milliGRAM(s) Oral every 12 hours  atorvastatin 40 milliGRAM(s) Oral at bedtime  cyclophosphamide IVPB (eMAR) 750 milliGRAM(s) IV Intermittent once  etoposide (TOPOSAR) IVPB (eMAR) 56 milliGRAM(s) IV Intermittent once  fosaprepitant IVPB 150 milliGRAM(s) IV Intermittent once  melatonin 3 milliGRAM(s) Oral at bedtime  metoprolol succinate  milliGRAM(s) Oral daily  multivitamin 1 Tablet(s) Oral daily  ondansetron Injectable 8 milliGRAM(s) IV Push once  predniSONE   Tablet 100 milliGRAM(s) Oral every 24 hours  sacubitril 49 mG/valsartan 51 mG 1 Tablet(s) Oral two times a day  spironolactone 25 milliGRAM(s) Oral <User Schedule>  vinCRIStine IVPB (eMAR) 2 milliGRAM(s) IV Intermittent once    MEDICATIONS  (PRN):  guaiFENesin Oral Liquid (Sugar-Free) 100 milliGRAM(s) Oral every 6 hours PRN Cough  senna 2 Tablet(s) Oral at bedtime PRN Constipation  valproate sodium Injectable. 125 milliGRAM(s) IV Push once PRN Agitation at night      rasburicase (Other)      LABS:                        10.3   7.94  )-----------( 234      ( 30 Sep 2022 09:38 )             34.8     09-30    141  |  104  |  22  ----------------------------<  95  4.0   |  28  |  1.08    Ca    9.3      30 Sep 2022 09:40  Phos  3.1     09-30  Mg     1.8     09-30    TPro  7.0  /  Alb  3.7  /  TBili  0.6  /  DBili  x   /  AST  18  /  ALT  18  /  AlkPhos  190<H>  09-30                     RADIOLOGY & ADDITIONAL TESTS:  Studies reviewed.

## 2022-09-30 NOTE — ADVANCED PRACTICE NURSE CONSULT - ASSESSMENT
Pt with day 1/1 tx cycle 3, labs noted in sunrise, height, weight and bsa verified, okay to proceed with tx as per MD Brandon.  Pt with right double lumen picc + blood return noted, no pain, redness or swelling noted at site.  Pt premedicated as noted in sunrise.  Pt administered Obinutuzumab 1000 mg iv as per protocol. Pt monitored for duration of tx till completion.  Vitals documented in sunrise.  No adverse reaction noted.  Pt premedicated pre chemo as ordered.  Pt administered Cyclophosphamide 400 mg/m2 = 750 mg iv over 1 hour via locked pump.  Pt administered Etoposide 30 mg/m2 = 56 mg iv over 1 hour via locked pump.  Pt administered Vincristine 2 mg flat dose iv over 10 min via locked pump.  Blood return checked prior during and after administration completed.  Positive blood return noted.  Pt with periods of confusion/agitation in the evening.  Reorientation provided.  Reinforced with pt immunotherapy/chemotherapy regimen, pt verbalized some understanding.  Reinforcement needed.  Emotional support provided.  Report given to area rn.

## 2022-09-30 NOTE — PROGRESS NOTE ADULT - PROBLEM SELECTOR PLAN 2
- History of DLBCL (tx with R-CHOP in 2003, with relapse in 2009 treated with BR) now with recently diagnosed grade 3 follicular lymphoma on treatment with a modified regimen of mini-COEP plus Obinutuzumab  - Hematology consulted. Appreciate recs   - C1 Started on 9/1/22 (cycle length q21 days) but full dose obinutuzumab was started 9/2/22. Second dose on 9/9/22  - He was due for his 3rd weekly dose of obinutuzumab of cycle 1 (9/16/22) but now on hold, pending further workup of encephalopathy  - per Onc (9/29), daughter amenable to PICC initiating chemotherapy after placement; mediport placement for outpt chemo tx after dc - History of DLBCL (tx with R-CHOP in 2003, with relapse in 2009 treated with BR) now with recently diagnosed grade 3 follicular lymphoma on treatment with a modified regimen of mini-COEP plus Obinutuzumab  - Hematology consulted. Appreciate recs   - C1 Started on 9/1/22 (cycle length q21 days) but full dose obinutuzumab was started 9/2/22. Second dose on 9/9/22  - He was due for his 3rd weekly dose of obinutuzumab of cycle 1 (9/16/22) but now on hold, pending further workup of encephalopathy  - per Onc (9/29), daughter amenable to PICC initiating chemotherapy after placement; mediport placement for outpt chemo tx after dc  - Tx with C2 O-mini-COEP planned 9/30

## 2022-09-30 NOTE — PROGRESS NOTE ADULT - ASSESSMENT
72M hx DLBCL (tx with R-CHOP in 2003, with relapse of DLBCL in 2009 treated with BR) now with multiple areas of palpable lymphadenopathy with biopsies shown to be relapse of DLBCL. Recent hospitalization for hemolysis due to G6PD deficiency and rasburicase. Had been started on O-COEP last admission. Now presenting with encephalopathy.    #Encephalopathy  - Workup for metabolic causes has been negative thus far  - entero/rhinovirus+ but rest of infectious workup neg  - MRI brain without acute pathology  - EEG neg for seizures  - s/p LP, flow cytometry insufficient cells to report  - appreciate neuro recs  - based on above, does not appear to have gross CNS involvement by DLBCL    #Relapsed Diffuse Large B-Cell Lymphoma  Follows with Dr. Cordova at [facility]. Originally diagnosed in 2003 s/p RCHOP with relapse in 2009 s/p BR. 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 During last admission, had excisional biopsy but was performed after starting treatment.  - s/p excisional biopsy of back lesion on 9/7 showing DLBCL  - Patient is on treatment with a modified regimen of mini-COEP plus Obinutuzumab. C1 Started on 9/1/22 (cycle length q21 days) but full dose obinutuzumab was started 9/2/22.  - plan on treating with C2 O-mini-COEP 09/30  - obi 1000mg D1  - cyclophosphamide 400mg/m2 D1  - vincristine 2mg flat dose D1  - etoposide 30mg/m2 IV D1, 60mg/m2 PO D2,3  - prednisone 100mg PO daily D1-5  - will need outpatient [facility] appt on D4  - PICC placed 09/29, please remove prior to d/c. Will arrange for [facility] outpatient  - on discharge:  - oral etoposide for 10/01 and 10/02 prescribed to [facility] who will deliver it to [facility] 09/30 afternoon for family to  from pharmacy  - please prescribed prednisone to finish 5d course as above  - patient has a Neulasta appt at [facility] on 10/03 at 2pm  - outpatient f/u with Dr. Cordova at [facility]

## 2022-09-30 NOTE — PROGRESS NOTE ADULT - PROBLEM SELECTOR PLAN 7
DIET: Dash/TLC  DVT Prophylaxis: Eliquis 5 mg BID restarted 9/29   Dispo: Pending    Discussed with daughterVonnie regarding plan of care.

## 2022-10-01 DIAGNOSIS — C83.30 DIFFUSE LARGE B-CELL LYMPHOMA, UNSPECIFIED SITE: ICD-10-CM

## 2022-10-01 LAB
ALBUMIN SERPL ELPH-MCNC: 3.6 G/DL — SIGNIFICANT CHANGE UP (ref 3.3–5)
ALP SERPL-CCNC: 175 U/L — HIGH (ref 40–120)
ALT FLD-CCNC: 15 U/L — SIGNIFICANT CHANGE UP (ref 10–45)
ANION GAP SERPL CALC-SCNC: 9 MMOL/L — SIGNIFICANT CHANGE UP (ref 5–17)
AST SERPL-CCNC: 13 U/L — SIGNIFICANT CHANGE UP (ref 10–40)
BILIRUB SERPL-MCNC: 0.6 MG/DL — SIGNIFICANT CHANGE UP (ref 0.2–1.2)
BUN SERPL-MCNC: 23 MG/DL — SIGNIFICANT CHANGE UP (ref 7–23)
CALCIUM SERPL-MCNC: 8.9 MG/DL — SIGNIFICANT CHANGE UP (ref 8.4–10.5)
CHLORIDE SERPL-SCNC: 107 MMOL/L — SIGNIFICANT CHANGE UP (ref 96–108)
CO2 SERPL-SCNC: 25 MMOL/L — SIGNIFICANT CHANGE UP (ref 22–31)
CREAT SERPL-MCNC: 1.03 MG/DL — SIGNIFICANT CHANGE UP (ref 0.5–1.3)
EGFR: 78 ML/MIN/1.73M2 — SIGNIFICANT CHANGE UP
GLUCOSE SERPL-MCNC: 160 MG/DL — HIGH (ref 70–99)
HCT VFR BLD CALC: 35.9 % — LOW (ref 39–50)
HGB BLD-MCNC: 10.8 G/DL — LOW (ref 13–17)
MAGNESIUM SERPL-MCNC: 1.8 MG/DL — SIGNIFICANT CHANGE UP (ref 1.6–2.6)
MCHC RBC-ENTMCNC: 26.3 PG — LOW (ref 27–34)
MCHC RBC-ENTMCNC: 30.1 GM/DL — LOW (ref 32–36)
MCV RBC AUTO: 87.6 FL — SIGNIFICANT CHANGE UP (ref 80–100)
NRBC # BLD: 0 /100 WBCS — SIGNIFICANT CHANGE UP (ref 0–0)
PHOSPHATE SERPL-MCNC: 3.2 MG/DL — SIGNIFICANT CHANGE UP (ref 2.5–4.5)
PLATELET # BLD AUTO: 198 K/UL — SIGNIFICANT CHANGE UP (ref 150–400)
POTASSIUM SERPL-MCNC: 4.4 MMOL/L — SIGNIFICANT CHANGE UP (ref 3.5–5.3)
POTASSIUM SERPL-SCNC: 4.4 MMOL/L — SIGNIFICANT CHANGE UP (ref 3.5–5.3)
PROT SERPL-MCNC: 6.7 G/DL — SIGNIFICANT CHANGE UP (ref 6–8.3)
RBC # BLD: 4.1 M/UL — LOW (ref 4.2–5.8)
RBC # FLD: 19.9 % — HIGH (ref 10.3–14.5)
SODIUM SERPL-SCNC: 141 MMOL/L — SIGNIFICANT CHANGE UP (ref 135–145)
WBC # BLD: 9.79 K/UL — SIGNIFICANT CHANGE UP (ref 3.8–10.5)
WBC # FLD AUTO: 9.79 K/UL — SIGNIFICANT CHANGE UP (ref 3.8–10.5)

## 2022-10-01 PROCEDURE — 99232 SBSQ HOSP IP/OBS MODERATE 35: CPT

## 2022-10-01 PROCEDURE — 99232 SBSQ HOSP IP/OBS MODERATE 35: CPT | Mod: GC

## 2022-10-01 RX ORDER — ETOPOSIDE 20 MG/ML
110 VIAL (ML) INTRAVENOUS EVERY 24 HOURS
Refills: 0 | Status: DISCONTINUED | OUTPATIENT
Start: 2022-10-01 | End: 2022-10-01

## 2022-10-01 RX ORDER — ETOPOSIDE 20 MG/ML
100 VIAL (ML) INTRAVENOUS EVERY 24 HOURS
Refills: 0 | Status: COMPLETED | OUTPATIENT
Start: 2022-10-01 | End: 2022-10-02

## 2022-10-01 RX ADMIN — SACUBITRIL AND VALSARTAN 1 TABLET(S): 24; 26 TABLET, FILM COATED ORAL at 17:52

## 2022-10-01 RX ADMIN — SACUBITRIL AND VALSARTAN 1 TABLET(S): 24; 26 TABLET, FILM COATED ORAL at 06:13

## 2022-10-01 RX ADMIN — Medication 400 MILLIGRAM(S): at 17:51

## 2022-10-01 RX ADMIN — Medication 100 MILLIGRAM(S): at 06:13

## 2022-10-01 RX ADMIN — Medication 100 MILLIGRAM(S): at 17:52

## 2022-10-01 RX ADMIN — APIXABAN 5 MILLIGRAM(S): 2.5 TABLET, FILM COATED ORAL at 06:13

## 2022-10-01 RX ADMIN — Medication 100 MILLIGRAM(S): at 14:27

## 2022-10-01 RX ADMIN — Medication 3 MILLILITER(S): at 17:54

## 2022-10-01 RX ADMIN — Medication 3 MILLIGRAM(S): at 21:18

## 2022-10-01 RX ADMIN — Medication 400 MILLIGRAM(S): at 06:13

## 2022-10-01 RX ADMIN — Medication 100 MILLIGRAM(S): at 14:31

## 2022-10-01 RX ADMIN — Medication 1 TABLET(S): at 14:28

## 2022-10-01 RX ADMIN — ATORVASTATIN CALCIUM 40 MILLIGRAM(S): 80 TABLET, FILM COATED ORAL at 21:18

## 2022-10-01 RX ADMIN — APIXABAN 5 MILLIGRAM(S): 2.5 TABLET, FILM COATED ORAL at 17:52

## 2022-10-01 RX ADMIN — SPIRONOLACTONE 25 MILLIGRAM(S): 25 TABLET, FILM COATED ORAL at 14:29

## 2022-10-01 NOTE — PROGRESS NOTE ADULT - ASSESSMENT
72 year old male with afib (on eliquis), HTN,  complete heart block s/p PPM, HLD, HFrEF (30% in 09/2022), and DLBCL (tx with R-CHOP in 2003, with relapse in 2009 treated with BR) now with recently diagnosed grade 3 follicular lymphoma who was transferred from [facility] for acute on chronic encephalopathy likely 2/2 rhino/entero viral pneumonia i/s/o follicular lymphoma and ongoing chemotherapy treatment. Possible etiologies include drug induced 2/2 chemo vs autoimmune vs paraneoplastic.     Wkdn 10/1: will f/u w onc if patient getting more chemo inpatient, otherwise patient is doing well, refused labs today. Phlebotomy will try again if they can come back because patient might consider labs if done by phlebotomy.

## 2022-10-01 NOTE — PROGRESS NOTE ADULT - PROBLEM SELECTOR PLAN 1
- Daughter notes that patient is A&Ox3 at baseline but had episodes of confusion at times, mostly during last hospitalization.   - Etiology may be underlying dementia, delirium, vs drug induced i/s/o chemotherapy vs metabolic causes vs neurological vs malignancy  - CTH stable chronic infarct without acute findings  - Consider CNS involvement in lymphoma with leptomeningeal disease or side effects from the medications     Plan:  - Supportive care of rhino-enteroviral pneumonia  - CXR with loculated effusion minimally increased from 08/23. However, not seen on CXR from 09/2022.   - CT chest with no acute findings   - infectious workup negative    - Check syphilis screen, TSH, B12: all normal   - MRI with chronic right posterior parietal stroke without significant interval change.   - EEG with Mild nonspecific diffuse cerebral dysfunction; No overt asymmetry despite known lesion; There were no epileptiform abnormalities recorded.   - LP with lymphoid cells    - Eliquis restarted 9/29  - PICC line placed 9/29; will receive chemo tx 9/30

## 2022-10-01 NOTE — PROGRESS NOTE ADULT - PROBLEM SELECTOR PLAN 5
- Appears overall euvolemic on exam   - Pro-BNP 10382 <- 04961 (from last admission), troponin 41   - HFrEF with TTE from 09/2022 with EF 30%, severe global left ventricular systolic dysfunction. Mild RV enlargement with decreased RV systolic function    Plan:   - Continue home medications with hold parameters   - Metoprolol succinate 100 mg PO QD  - Entresto 49/51 PO BID with hold parameters   - spironolactone 25 mg PO QD  - Consider SGLT2 as outpatient

## 2022-10-01 NOTE — PROGRESS NOTE ADULT - PROBLEM SELECTOR PLAN 6
- History of afib w/ CHB s/p Micra pacemaker then s/p MDT CRT-P upgrade 9/30/19  - v paced on EKG, HR 74, Qtc 499       - Continue metoprolol succinate 100 mg PO QD  - Restarted Eliquis 9/29

## 2022-10-01 NOTE — PROGRESS NOTE ADULT - PROBLEM SELECTOR PLAN 1
C2 Day 3 of COEP chemotherapy: receiving low dose etoposide today. CBC stable and CR WNL. Will see when pt can be discharged with family but will continue with planned chemotherapy.

## 2022-10-01 NOTE — PROGRESS NOTE ADULT - ATTENDING COMMENTS
72 year old male with Afib (on eliquis), HTN,  complete heart block s/p PPM, HLD, HFrEF (30% in 09/2022), and DLBCL (tx with R-CHOP in 2003, with relapse in 2009 treated) now with recently diagnosed grade 3 follicular lymphoma who was transferred from [facility] for AMS. He presented for treatment at [facility] and  was later found wandering in the parking lot confused. Per daughter, in the morning of admisson, patient was somewhat agitated and slightly confused in AM and was reoriented.    In the ED, patient afebrile, HR 82, /74, RR 16 (96% on room air). CTH without acute findings, X-ray with mildly increased right loculated effusion. RVP + for rhino/enterovirus.      #DLBCL     appreciate Hem/onc rec---> No new MRI of brain ( chronic posterior parietal lobe ) no menigeal or white matter enhancement ,   mild   periventricular enhancement ( suggest microvascular changes) and  S/P LP on  9/26    CHemotherapy given by Onc on 9/30, as pt not discharged today as no f/u appt will continue planned chemo while here with etoposide and pred--will need to schedule appointment for infusion when [facility] open for scheduling on Monday    REMOVE the PICC LINE prior to dc    # Acute on CHronic Encephalopathy  ( Pt is S/P obinutuzumab)      Exacerbated by current viral  infection      COnt to closely monitor, daughter is told to that patient will need close monitoring at home for safety    # Enterovirus pneumonia       CW supportive care  / Antitussive meds and DUoneb as needed       Monitor SPO2       CT of chest with no evidence of consolidations        Daughter is Vonnie ( [phone] )       Milena Beth MD  Division of Hospital Medicine

## 2022-10-01 NOTE — PROGRESS NOTE ADULT - SUBJECTIVE AND OBJECTIVE BOX
Subjective:    INTERVAL HPI/OVERNIGHT EVENTS:   No acute events overnight  Afebrile, hemodynamically stable   - reportedly, patient's daughter cancelled Monday appt so patient to remain inpatient over wkdn since cannot reschedule as of now  - chemo inpatient  - will clarify w onc if going to remove PICC line now, if patient can get more chemo while inpatient.   - patient tired this AM, says did not sleep well but doing well otherwise.   - no CP, SOB.     Telemetry:    T(F): 97.5 (10-01-22 @ 05:34), Max: 98.1 (09-30-22 @ 16:10)  HR: 85 (10-01-22 @ 05:34) (81 - 93)  BP: 143/73 (10-01-22 @ 05:34) (135/98 - 159/84)  RR: 18 (10-01-22 @ 05:34) (16 - 18)  SpO2: 94% (10-01-22 @ 05:34) (94% - 99%)  I&O's Summary    30 Sep 2022 07:01  -  01 Oct 2022 07:00  --------------------------------------------------------  IN: 1166 mL / OUT: 200 mL / NET: 966 mL          Medications:  acyclovir   Oral Tab/Cap 400 milliGRAM(s) Oral two times a day  albuterol/ipratropium for Nebulization 3 milliLiter(s) Nebulizer every 6 hours  allopurinol 100 milliGRAM(s) Oral daily  apixaban 5 milliGRAM(s) Oral every 12 hours  atorvastatin 40 milliGRAM(s) Oral at bedtime  etoposide 100 milliGRAM(s) Oral every 24 hours  guaiFENesin Oral Liquid (Sugar-Free) 100 milliGRAM(s) Oral every 6 hours PRN  melatonin 3 milliGRAM(s) Oral at bedtime  metoprolol succinate  milliGRAM(s) Oral daily  multivitamin 1 Tablet(s) Oral daily  predniSONE   Tablet 100 milliGRAM(s) Oral every 24 hours  sacubitril 49 mG/valsartan 51 mG 1 Tablet(s) Oral two times a day  senna 2 Tablet(s) Oral at bedtime PRN  spironolactone 25 milliGRAM(s) Oral <User Schedule>  valproate sodium Injectable. 125 milliGRAM(s) IV Push once PRN      PHYSICAL EXAM:  GENERAL: NAD, lying in bed comfortably  HEAD:  Atraumatic, Normocephalic  EYES: EOMI, PERRLA, conjunctiva and sclera clear, no nystagmus noted  ENT: Moist mucous membranes,   NECK: Supple, No JVD, trachea midline  CHEST/LUNG: Clear to auscultation bilaterally; No rales, rhonchi, wheezing, or rubs. Unlabored respirations  HEART: Regular rate and rhythm; No murmurs, rubs, or gallops, normal S1/S2  ABDOMEN: normal bowel sounds; Soft, nontender, nondistended, no organomegaly   EXTREMITIES:  +PICC line RUE; 2+ Peripheral Pulses, brisk capillary refill. No clubbing, cyanosis, or edema  MSK: No gross deformities noted   Neurological:  A&Ox1, no focal deficits   SKIN: No rashes or lesions  PSYCH: Normal mood, affect     Notable Labs:    Labs:                          10.3   7.94  )-----------( 234      ( 30 Sep 2022 09:38 )             34.8     09-30    141  |  104  |  22  ----------------------------<  95  4.0   |  28  |  1.08    Ca    9.3      30 Sep 2022 09:40  Phos  3.1     09-30  Mg     1.8     09-30    TPro  7.0  /  Alb  3.7  /  TBili  0.6  /  DBili  x   /  AST  18  /  ALT  18  /  AlkPhos  190<H>  09-30    LIVER FUNCTIONS - ( 30 Sep 2022 09:40 )  Alb: 3.7 g/dL / Pro: 7.0 g/dL / ALK PHOS: 190 U/L / ALT: 18 U/L / AST: 18 U/L / GGT: x             CAPILLARY BLOOD GLUCOSE                    Micro:      RADIOLOGY & ADDITIONAL TESTS:    NNI

## 2022-10-01 NOTE — PROGRESS NOTE ADULT - SUBJECTIVE AND OBJECTIVE BOX
Hematology Follow-up    INTERVAL HPI/OVERNIGHT EVENTS: Denies any SOB or cough or pain. He slept well last night.     VITAL SIGNS:  T(F): 97.4 (10-01-22 @ 13:24)  HR: 74 (10-01-22 @ 13:24)  BP: 111/56 (10-01-22 @ 13:24)  RR: 18 (10-01-22 @ 13:24)  SpO2: 96% (10-01-22 @ 13:24)  Wt(kg): --    PHYSICAL EXAM:    Constitutional: NAD, lying in bed   Neck: supple, no masses, no JVD  Respiratory: clear anteriorly   Cardiovascular: RRR, normal S1S2  Gastrointestinal: soft, NTND, no masses palpable, BS normal  Extremities:  no c/c/e  Neurological: Grossly intact  Skin: No rash     MEDICATIONS  (STANDING):  acyclovir   Oral Tab/Cap 400 milliGRAM(s) Oral two times a day  albuterol/ipratropium for Nebulization 3 milliLiter(s) Nebulizer every 6 hours  allopurinol 100 milliGRAM(s) Oral daily  apixaban 5 milliGRAM(s) Oral every 12 hours  atorvastatin 40 milliGRAM(s) Oral at bedtime  etoposide 100 milliGRAM(s) Oral every 24 hours  melatonin 3 milliGRAM(s) Oral at bedtime  metoprolol succinate  milliGRAM(s) Oral daily  multivitamin 1 Tablet(s) Oral daily  predniSONE   Tablet 100 milliGRAM(s) Oral every 24 hours  sacubitril 49 mG/valsartan 51 mG 1 Tablet(s) Oral two times a day  spironolactone 25 milliGRAM(s) Oral <User Schedule>    MEDICATIONS  (PRN):  guaiFENesin Oral Liquid (Sugar-Free) 100 milliGRAM(s) Oral every 6 hours PRN Cough  senna 2 Tablet(s) Oral at bedtime PRN Constipation  valproate sodium Injectable. 125 milliGRAM(s) IV Push once PRN Agitation at night      rasburicase (Other)      LABS:                        10.8   9.79  )-----------( 198      ( 01 Oct 2022 12:04 )             35.9     10-01    141  |  107  |  23  ----------------------------<  160<H>  4.4   |  25  |  1.03    Ca    8.9      01 Oct 2022 12:04  Phos  3.2     10-01  Mg     1.8     10-01    TPro  6.7  /  Alb  3.6  /  TBili  0.6  /  DBili  x   /  AST  13  /  ALT  15  /  AlkPhos  175<H>  10-01           RADIOLOGY & ADDITIONAL TESTS:  Studies reviewed.

## 2022-10-01 NOTE — PROGRESS NOTE ADULT - PROBLEM SELECTOR PLAN 2
- History of DLBCL (tx with R-CHOP in 2003, with relapse in 2009 treated with BR) now with recently diagnosed grade 3 follicular lymphoma on treatment with a modified regimen of mini-COEP plus Obinutuzumab  - Hematology consulted. Appreciate recs   - C1 Started on 9/1/22 (cycle length q21 days) but full dose obinutuzumab was started 9/2/22. Second dose on 9/9/22  - He was due for his 3rd weekly dose of obinutuzumab of cycle 1 (9/16/22) but now on hold, pending further workup of encephalopathy  - per Onc (9/29), daughter amenable to PICC initiating chemotherapy after placement; mediport placement for outpt chemo tx after dc  - Tx with C2 O-mini-COEP planned 9/30

## 2022-10-02 DIAGNOSIS — D72.829 ELEVATED WHITE BLOOD CELL COUNT, UNSPECIFIED: ICD-10-CM

## 2022-10-02 LAB
ALBUMIN SERPL ELPH-MCNC: 3.8 G/DL — SIGNIFICANT CHANGE UP (ref 3.3–5)
ALP SERPL-CCNC: 180 U/L — HIGH (ref 40–120)
ALT FLD-CCNC: 30 U/L — SIGNIFICANT CHANGE UP (ref 10–45)
ANION GAP SERPL CALC-SCNC: 9 MMOL/L — SIGNIFICANT CHANGE UP (ref 5–17)
AST SERPL-CCNC: 28 U/L — SIGNIFICANT CHANGE UP (ref 10–40)
BILIRUB SERPL-MCNC: 0.6 MG/DL — SIGNIFICANT CHANGE UP (ref 0.2–1.2)
BUN SERPL-MCNC: 36 MG/DL — HIGH (ref 7–23)
CALCIUM SERPL-MCNC: 9.1 MG/DL — SIGNIFICANT CHANGE UP (ref 8.4–10.5)
CHLORIDE SERPL-SCNC: 104 MMOL/L — SIGNIFICANT CHANGE UP (ref 96–108)
CO2 SERPL-SCNC: 26 MMOL/L — SIGNIFICANT CHANGE UP (ref 22–31)
CREAT SERPL-MCNC: 1.07 MG/DL — SIGNIFICANT CHANGE UP (ref 0.5–1.3)
EGFR: 74 ML/MIN/1.73M2 — SIGNIFICANT CHANGE UP
GLUCOSE BLDC GLUCOMTR-MCNC: 192 MG/DL — HIGH (ref 70–99)
GLUCOSE BLDC GLUCOMTR-MCNC: 217 MG/DL — HIGH (ref 70–99)
GLUCOSE SERPL-MCNC: 190 MG/DL — HIGH (ref 70–99)
HCT VFR BLD CALC: 37.1 % — LOW (ref 39–50)
HGB BLD-MCNC: 11.1 G/DL — LOW (ref 13–17)
MAGNESIUM SERPL-MCNC: 1.9 MG/DL — SIGNIFICANT CHANGE UP (ref 1.6–2.6)
MCHC RBC-ENTMCNC: 26.3 PG — LOW (ref 27–34)
MCHC RBC-ENTMCNC: 29.9 GM/DL — LOW (ref 32–36)
MCV RBC AUTO: 87.9 FL — SIGNIFICANT CHANGE UP (ref 80–100)
MRSA PCR RESULT.: SIGNIFICANT CHANGE UP
NRBC # BLD: 0 /100 WBCS — SIGNIFICANT CHANGE UP (ref 0–0)
PHOSPHATE SERPL-MCNC: 3 MG/DL — SIGNIFICANT CHANGE UP (ref 2.5–4.5)
PLATELET # BLD AUTO: 247 K/UL — SIGNIFICANT CHANGE UP (ref 150–400)
POTASSIUM SERPL-MCNC: 4.6 MMOL/L — SIGNIFICANT CHANGE UP (ref 3.5–5.3)
POTASSIUM SERPL-SCNC: 4.6 MMOL/L — SIGNIFICANT CHANGE UP (ref 3.5–5.3)
PROT SERPL-MCNC: 6.8 G/DL — SIGNIFICANT CHANGE UP (ref 6–8.3)
RBC # BLD: 4.22 M/UL — SIGNIFICANT CHANGE UP (ref 4.2–5.8)
RBC # FLD: 20.1 % — HIGH (ref 10.3–14.5)
S AUREUS DNA NOSE QL NAA+PROBE: SIGNIFICANT CHANGE UP
SODIUM SERPL-SCNC: 139 MMOL/L — SIGNIFICANT CHANGE UP (ref 135–145)
VIT B1 SERPL-MCNC: 161.2 NMOL/L — SIGNIFICANT CHANGE UP (ref 66.5–200)
WBC # BLD: 18.6 K/UL — HIGH (ref 3.8–10.5)
WBC # FLD AUTO: 18.6 K/UL — HIGH (ref 3.8–10.5)

## 2022-10-02 PROCEDURE — 99232 SBSQ HOSP IP/OBS MODERATE 35: CPT

## 2022-10-02 PROCEDURE — 99232 SBSQ HOSP IP/OBS MODERATE 35: CPT | Mod: GC

## 2022-10-02 RX ORDER — CHLORHEXIDINE GLUCONATE 213 G/1000ML
1 SOLUTION TOPICAL DAILY
Refills: 0 | Status: DISCONTINUED | OUTPATIENT
Start: 2022-10-02 | End: 2022-10-03

## 2022-10-02 RX ADMIN — Medication 100 MILLIGRAM(S): at 18:14

## 2022-10-02 RX ADMIN — Medication 400 MILLIGRAM(S): at 05:25

## 2022-10-02 RX ADMIN — SPIRONOLACTONE 25 MILLIGRAM(S): 25 TABLET, FILM COATED ORAL at 11:59

## 2022-10-02 RX ADMIN — Medication 3 MILLILITER(S): at 12:02

## 2022-10-02 RX ADMIN — Medication 3 MILLIGRAM(S): at 22:57

## 2022-10-02 RX ADMIN — ATORVASTATIN CALCIUM 40 MILLIGRAM(S): 80 TABLET, FILM COATED ORAL at 22:57

## 2022-10-02 RX ADMIN — SACUBITRIL AND VALSARTAN 1 TABLET(S): 24; 26 TABLET, FILM COATED ORAL at 18:14

## 2022-10-02 RX ADMIN — APIXABAN 5 MILLIGRAM(S): 2.5 TABLET, FILM COATED ORAL at 18:14

## 2022-10-02 RX ADMIN — Medication 100 MILLIGRAM(S): at 14:35

## 2022-10-02 RX ADMIN — SACUBITRIL AND VALSARTAN 1 TABLET(S): 24; 26 TABLET, FILM COATED ORAL at 05:25

## 2022-10-02 RX ADMIN — APIXABAN 5 MILLIGRAM(S): 2.5 TABLET, FILM COATED ORAL at 05:25

## 2022-10-02 RX ADMIN — Medication 100 MILLIGRAM(S): at 11:59

## 2022-10-02 RX ADMIN — Medication 400 MILLIGRAM(S): at 18:14

## 2022-10-02 RX ADMIN — CHLORHEXIDINE GLUCONATE 1 APPLICATION(S): 213 SOLUTION TOPICAL at 10:35

## 2022-10-02 RX ADMIN — Medication 1 TABLET(S): at 11:59

## 2022-10-02 RX ADMIN — Medication 3 MILLILITER(S): at 05:26

## 2022-10-02 RX ADMIN — Medication 3 MILLILITER(S): at 00:42

## 2022-10-02 RX ADMIN — Medication 100 MILLIGRAM(S): at 05:25

## 2022-10-02 NOTE — PROGRESS NOTE ADULT - PROBLEM SELECTOR PLAN 1
He will continue with etoposide and have discharge planning. Will follow up with Dr Cordova as outpatient.

## 2022-10-02 NOTE — PROGRESS NOTE ADULT - SUBJECTIVE AND OBJECTIVE BOX
Hematology Follow-up    INTERVAL HPI/OVERNIGHT EVENTS: Feels overall well. No GI upset or pain.     VITAL SIGNS:  T(F): 97.6 (10-02-22 @ 11:55)  HR: 90 (10-02-22 @ 05:31)  BP: 121/69 (10-02-22 @ 11:55)  RR: 16 (10-02-22 @ 11:55)  SpO2: 99% (10-02-22 @ 11:55)  Wt(kg): --    PHYSICAL EXAM:    Constitutional: AAOx3, NAD,   Eyes: PERRL, EOMI, sclera non-icteric  Neck: supple, no masses, no JVD  Respiratory: CTA b/l, good air entry b/l, no wheezing, rhonchi, rales, with normal respiratory effort and no intercostal retractions  Cardiovascular: RRR, normal S1S2, no M/R/G  Gastrointestinal: soft, NTND, no masses palpable, BS normal in all four quadrants, no HSM  Extremities:  no c/c/e  Neurological: Grossly intact  Skin: Normal temperature    MEDICATIONS  (STANDING):  acyclovir   Oral Tab/Cap 400 milliGRAM(s) Oral two times a day  albuterol/ipratropium for Nebulization 3 milliLiter(s) Nebulizer every 6 hours  allopurinol 100 milliGRAM(s) Oral daily  apixaban 5 milliGRAM(s) Oral every 12 hours  atorvastatin 40 milliGRAM(s) Oral at bedtime  chlorhexidine 2% Cloths 1 Application(s) Topical daily  etoposide 100 milliGRAM(s) Oral every 24 hours  melatonin 3 milliGRAM(s) Oral at bedtime  metoprolol succinate  milliGRAM(s) Oral daily  multivitamin 1 Tablet(s) Oral daily  predniSONE   Tablet 100 milliGRAM(s) Oral every 24 hours  sacubitril 49 mG/valsartan 51 mG 1 Tablet(s) Oral two times a day  spironolactone 25 milliGRAM(s) Oral <User Schedule>    MEDICATIONS  (PRN):  guaiFENesin Oral Liquid (Sugar-Free) 100 milliGRAM(s) Oral every 6 hours PRN Cough  senna 2 Tablet(s) Oral at bedtime PRN Constipation  valproate sodium Injectable. 125 milliGRAM(s) IV Push once PRN Agitation at night      rasburicase (Other)      LABS:                        11.1   18.60 )-----------( 247      ( 02 Oct 2022 07:13 )             37.1     10-02    139  |  104  |  36<H>  ----------------------------<  190<H>  4.6   |  26  |  1.07    Ca    9.1      02 Oct 2022 07:03  Phos  3.0     10-02  Mg     1.9     10-02    TPro  6.8  /  Alb  3.8  /  TBili  0.6  /  DBili  x   /  AST  28  /  ALT  30  /  AlkPhos  180<H>  10-02           RADIOLOGY & ADDITIONAL TESTS:  Studies reviewed.

## 2022-10-02 NOTE — PROGRESS NOTE ADULT - PROBLEM SELECTOR PLAN 5
- Appears overall euvolemic on exam   - Pro-BNP 59944 <- 55907 (from last admission), troponin 41   - HFrEF with TTE from 09/2022 with EF 30%, severe global left ventricular systolic dysfunction. Mild RV enlargement with decreased RV systolic function    Plan:   - Continue home medications with hold parameters   - Metoprolol succinate 100 mg PO QD  - Entresto 49/51 PO BID with hold parameters   - spironolactone 25 mg PO QD  - Consider SGLT2 as outpatient

## 2022-10-02 NOTE — PROGRESS NOTE ADULT - PROBLEM SELECTOR PLAN 2
- History of DLBCL (tx with R-CHOP in 2003, with relapse in 2009 treated with BR) now with recently diagnosed grade 3 follicular lymphoma on treatment with a modified regimen of mini-COEP plus Obinutuzumab  - Hematology consulted. Appreciate recs   - C1 Started on 9/1/22 (cycle length q21 days) but full dose obinutuzumab was started 9/2/22. Second dose on 9/9/22  - He was due for his 3rd weekly dose of obinutuzumab of cycle 1 (9/16/22) but now on hold, pending further workup of encephalopathy  - per Onc (9/29), daughter amenable to PICC initiating chemotherapy after placement; mediport placement for outpt chemo tx after dc  - Tx with C2 O-mini-COEP planned 9/30 - History of DLBCL (tx with R-CHOP in 2003, with relapse in 2009 treated with BR) now with recently diagnosed grade 3 follicular lymphoma on treatment with a modified regimen of mini-COEP plus Obinutuzumab  - Hematology consulted. Appreciate recs   - C1 Started on 9/1/22 (cycle length q21 days) but full dose obinutuzumab was started 9/2/22. Second dose on 9/9/22  - He was due for his 3rd weekly dose of obinutuzumab of cycle 1 (9/16/22) but now on hold, pending further workup of encephalopathy  - per Onc (9/29), daughter amenable to PICC initiating chemotherapy after placement; mediport placement for outpt chemo tx after dc  - Tx with C2 O-mini-COEP planned 9/30  - Received low dose etopiside Day 3 on 10/1

## 2022-10-02 NOTE — PROGRESS NOTE ADULT - ATTENDING COMMENTS
72 year old male with Afib (on eliquis), HTN,  complete heart block s/p PPM, HLD, HFrEF (30% in 09/2022), and DLBCL (tx with R-CHOP in 2003, with relapse in 2009 treated) now with recently diagnosed grade 3 follicular lymphoma who was transferred from [facility] for AMS. He presented for [place] at [facility] and  was later found wandering in the parking lot confused. Per daughter, in the morning of admisson, patient was somewhat agitated and slightly confused in AM and was reoriented.    In the ED, patient afebrile, HR 82, /74, RR 16 (96% on room air). CTH without acute findings, X-ray with mildly increased right loculated effusion. RVP + for rhino/enterovirus.      #DLBCL     appreciate Hem/onc rec---> No new MRI of brain ( chronic posterior parietal lobe ) no meningeal or white matter enhancement ,   mild   periventricular enhancement ( suggest microvascular changes) and  S/P LP on  9/26    CHemotherapy given by Onc on 9/30 and onc follow up about any plan for further chemo     will need to schedule appointment for infusion when [facility] open for scheduling on Monday    REMOVE the PICC LINE prior to dc    # Acute on CHronic Encephalopathy  ( Pt is S/P obinutuzumab)      Exacerbated by current viral  infection      COnt to closely monitor, daughter is told to that patient will need close monitoring at home for safety    # Enterovirus pneumonia       CW supportive care  / Antitussive meds and DUoneb as needed       Monitor SPO2       CT of chest with no evidence of consolidations    Daughter is Vonnie ( [phone] ) and has been updated     Leonie Steele MD  Division of Hospital Medicine  [phone] /TEAMS

## 2022-10-02 NOTE — PROGRESS NOTE ADULT - SUBJECTIVE AND OBJECTIVE BOX
Internal Medicine   Gisele Niya | PGY-1    OVERNIGHT EVENTS: No acute overnight events.    SUBJECTIVE:       MEDICATIONS  (STANDING):  acyclovir   Oral Tab/Cap 400 milliGRAM(s) Oral two times a day  albuterol/ipratropium for Nebulization 3 milliLiter(s) Nebulizer every 6 hours  allopurinol 100 milliGRAM(s) Oral daily  apixaban 5 milliGRAM(s) Oral every 12 hours  atorvastatin 40 milliGRAM(s) Oral at bedtime  etoposide 100 milliGRAM(s) Oral every 24 hours  melatonin 3 milliGRAM(s) Oral at bedtime  metoprolol succinate  milliGRAM(s) Oral daily  multivitamin 1 Tablet(s) Oral daily  predniSONE   Tablet 100 milliGRAM(s) Oral every 24 hours  sacubitril 49 mG/valsartan 51 mG 1 Tablet(s) Oral two times a day  spironolactone 25 milliGRAM(s) Oral <User Schedule>    MEDICATIONS  (PRN):  guaiFENesin Oral Liquid (Sugar-Free) 100 milliGRAM(s) Oral every 6 hours PRN Cough  senna 2 Tablet(s) Oral at bedtime PRN Constipation  valproate sodium Injectable. 125 milliGRAM(s) IV Push once PRN Agitation at night        T(F): 97.6 (10-02-22 @ 05:31), Max: 97.6 (10-01-22 @ 20:29)  HR: 90 (10-02-22 @ 05:31) (74 - 90)  BP: 145/79 (10-02-22 @ 05:31) (111/56 - 145/79)  BP(mean): --  RR: 16 (10-02-22 @ 05:31) (16 - 18)  SpO2: 97% (10-02-22 @ 05:31) (96% - 97%)    PHYSICAL EXAM:     GENERAL: NAD, lying in bed comfortably  HEAD:  Atraumatic, Normocephalic  EYES: EOMI, PERRLA, conjunctiva and sclera clear, no nystagmus noted  ENT: Moist mucous membranes,   NECK: Supple, No JVD, trachea midline  CHEST/LUNG: Clear to auscultation bilaterally; No rales, rhonchi, wheezing, or rubs. Unlabored respirations  HEART: Regular rate and rhythm; No murmurs, rubs, or gallops, normal S1/S2  ABDOMEN: normal bowel sounds; Soft, nontender, nondistended, no organomegaly   EXTREMITIES:  2+ Peripheral Pulses, brisk capillary refill. No clubbing, cyanosis, or edema  MSK: No gross deformities noted   Neurological:  A&Ox3, no focal deficits   SKIN: No rashes or lesions  PSYCH: Normal mood, affect     TELEMETRY:    LABS:                        10.8   9.79  )-----------( 198      ( 01 Oct 2022 12:04 )             35.9     10-01    141  |  107  |  23  ----------------------------<  160<H>  4.4   |  25  |  1.03    Ca    8.9      01 Oct 2022 12:04  Phos  3.2     10-01  Mg     1.8     10-01    TPro  6.7  /  Alb  3.6  /  TBili  0.6  /  DBili  x   /  AST  13  /  ALT  15  /  AlkPhos  175<H>  10-01            Creatinine Trend: 1.03<--, 1.08<--, 1.00<--, 1.08<--, 1.12<--, 1.04<--  I&O's Summary    30 Sep 2022 07:01  -  01 Oct 2022 07:00  --------------------------------------------------------  IN: 1166 mL / OUT: 200 mL / NET: 966 mL      BNP    RADIOLOGY & ADDITIONAL STUDIES:             Internal Medicine   Gisele Niya | PGY-1    OVERNIGHT EVENTS: No acute overnight events.    SUBJECTIVE: Patient was seen and examined at bedside this morning. AOx1 this AM. Denies any nausea/vomiting/diarrhea, headache, shortness of breath, abdominal pain or chest pain/palpitations. Vital signs/imaging/telemetry events reviewed.       MEDICATIONS  (STANDING):  acyclovir   Oral Tab/Cap 400 milliGRAM(s) Oral two times a day  albuterol/ipratropium for Nebulization 3 milliLiter(s) Nebulizer every 6 hours  allopurinol 100 milliGRAM(s) Oral daily  apixaban 5 milliGRAM(s) Oral every 12 hours  atorvastatin 40 milliGRAM(s) Oral at bedtime  etoposide 100 milliGRAM(s) Oral every 24 hours  melatonin 3 milliGRAM(s) Oral at bedtime  metoprolol succinate  milliGRAM(s) Oral daily  multivitamin 1 Tablet(s) Oral daily  predniSONE   Tablet 100 milliGRAM(s) Oral every 24 hours  sacubitril 49 mG/valsartan 51 mG 1 Tablet(s) Oral two times a day  spironolactone 25 milliGRAM(s) Oral <User Schedule>    MEDICATIONS  (PRN):  guaiFENesin Oral Liquid (Sugar-Free) 100 milliGRAM(s) Oral every 6 hours PRN Cough  senna 2 Tablet(s) Oral at bedtime PRN Constipation  valproate sodium Injectable. 125 milliGRAM(s) IV Push once PRN Agitation at night        T(F): 97.6 (10-02-22 @ 05:31), Max: 97.6 (10-01-22 @ 20:29)  HR: 90 (10-02-22 @ 05:31) (74 - 90)  BP: 145/79 (10-02-22 @ 05:31) (111/56 - 145/79)  BP(mean): --  RR: 16 (10-02-22 @ 05:31) (16 - 18)  SpO2: 97% (10-02-22 @ 05:31) (96% - 97%)    PHYSICAL EXAM:     GENERAL: NAD, lying in bed comfortably  HEAD:  Atraumatic, Normocephalic  EYES: EOMI, PERRLA, conjunctiva and sclera clear, no nystagmus noted  ENT: Moist mucous membranes,   NECK: Supple, No JVD, trachea midline  CHEST/LUNG: Clear to auscultation bilaterally; No rales, rhonchi, wheezing, or rubs. Unlabored respirations  HEART: Regular rate and rhythm; No murmurs, rubs, or gallops, normal S1/S2  ABDOMEN: normal bowel sounds; Soft, nontender, nondistended, no organomegaly   EXTREMITIES:  2+ Peripheral Pulses, brisk capillary refill. No clubbing, cyanosis, or edema  MSK: No gross deformities noted   Neurological:  A&Ox3, no focal deficits   SKIN: No rashes or lesions  PSYCH: Normal mood, affect     TELEMETRY:    LABS:                        10.8   9.79  )-----------( 198      ( 01 Oct 2022 12:04 )             35.9     10-01    141  |  107  |  23  ----------------------------<  160<H>  4.4   |  25  |  1.03    Ca    8.9      01 Oct 2022 12:04  Phos  3.2     10-01  Mg     1.8     10-01    TPro  6.7  /  Alb  3.6  /  TBili  0.6  /  DBili  x   /  AST  13  /  ALT  15  /  AlkPhos  175<H>  10-01            Creatinine Trend: 1.03<--, 1.08<--, 1.00<--, 1.08<--, 1.12<--, 1.04<--  I&O's Summary    30 Sep 2022 07:01  -  01 Oct 2022 07:00  --------------------------------------------------------  IN: 1166 mL / OUT: 200 mL / NET: 966 mL      BNP    RADIOLOGY & ADDITIONAL STUDIES:

## 2022-10-03 ENCOUNTER — APPOINTMENT (OUTPATIENT)
Dept: INFUSION THERAPY | Facility: HOSPITAL | Age: 72
End: 2022-10-03

## 2022-10-03 ENCOUNTER — TRANSCRIPTION ENCOUNTER (OUTPATIENT)
Age: 72
End: 2022-10-03

## 2022-10-03 VITALS
TEMPERATURE: 98 F | DIASTOLIC BLOOD PRESSURE: 76 MMHG | OXYGEN SATURATION: 100 % | HEART RATE: 77 BPM | RESPIRATION RATE: 18 BRPM | SYSTOLIC BLOOD PRESSURE: 149 MMHG

## 2022-10-03 LAB
ALBUMIN SERPL ELPH-MCNC: 3.8 G/DL — SIGNIFICANT CHANGE UP (ref 3.3–5)
ALP SERPL-CCNC: 157 U/L — HIGH (ref 40–120)
ALT FLD-CCNC: 29 U/L — SIGNIFICANT CHANGE UP (ref 10–45)
ANION GAP SERPL CALC-SCNC: 11 MMOL/L — SIGNIFICANT CHANGE UP (ref 5–17)
ANISOCYTOSIS BLD QL: SLIGHT — SIGNIFICANT CHANGE UP
AST SERPL-CCNC: 23 U/L — SIGNIFICANT CHANGE UP (ref 10–40)
BASOPHILS # BLD AUTO: 0 K/UL — SIGNIFICANT CHANGE UP (ref 0–0.2)
BASOPHILS NFR BLD AUTO: 0 % — SIGNIFICANT CHANGE UP (ref 0–2)
BILIRUB SERPL-MCNC: 0.6 MG/DL — SIGNIFICANT CHANGE UP (ref 0.2–1.2)
BUN SERPL-MCNC: 37 MG/DL — HIGH (ref 7–23)
CALCIUM SERPL-MCNC: 9.3 MG/DL — SIGNIFICANT CHANGE UP (ref 8.4–10.5)
CHLORIDE SERPL-SCNC: 104 MMOL/L — SIGNIFICANT CHANGE UP (ref 96–108)
CO2 SERPL-SCNC: 26 MMOL/L — SIGNIFICANT CHANGE UP (ref 22–31)
CREAT SERPL-MCNC: 1 MG/DL — SIGNIFICANT CHANGE UP (ref 0.5–1.3)
EGFR: 80 ML/MIN/1.73M2 — SIGNIFICANT CHANGE UP
ELLIPTOCYTES BLD QL SMEAR: SLIGHT — SIGNIFICANT CHANGE UP
EOSINOPHIL # BLD AUTO: 0 K/UL — SIGNIFICANT CHANGE UP (ref 0–0.5)
EOSINOPHIL NFR BLD AUTO: 0 % — SIGNIFICANT CHANGE UP (ref 0–6)
GLUCOSE SERPL-MCNC: 213 MG/DL — HIGH (ref 70–99)
HCT VFR BLD CALC: 39 % — SIGNIFICANT CHANGE UP (ref 39–50)
HGB BLD-MCNC: 11.8 G/DL — LOW (ref 13–17)
LDH SERPL L TO P-CCNC: 190 U/L — SIGNIFICANT CHANGE UP (ref 50–242)
LYMPHOCYTES # BLD AUTO: 0.14 K/UL — LOW (ref 1–3.3)
LYMPHOCYTES # BLD AUTO: 0.9 % — LOW (ref 13–44)
MACROCYTES BLD QL: SLIGHT — SIGNIFICANT CHANGE UP
MAGNESIUM SERPL-MCNC: 1.9 MG/DL — SIGNIFICANT CHANGE UP (ref 1.6–2.6)
MANUAL SMEAR VERIFICATION: SIGNIFICANT CHANGE UP
MCHC RBC-ENTMCNC: 26.9 PG — LOW (ref 27–34)
MCHC RBC-ENTMCNC: 30.3 GM/DL — LOW (ref 32–36)
MCV RBC AUTO: 88.8 FL — SIGNIFICANT CHANGE UP (ref 80–100)
MONOCYTES # BLD AUTO: 0 K/UL — SIGNIFICANT CHANGE UP (ref 0–0.9)
MONOCYTES NFR BLD AUTO: 0 % — LOW (ref 2–14)
NEUTROPHILS # BLD AUTO: 15.94 K/UL — HIGH (ref 1.8–7.4)
NEUTROPHILS NFR BLD AUTO: 98.2 % — HIGH (ref 43–77)
NEUTS BAND # BLD: 0.9 % — SIGNIFICANT CHANGE UP (ref 0–8)
PHOSPHATE SERPL-MCNC: 3.2 MG/DL — SIGNIFICANT CHANGE UP (ref 2.5–4.5)
PLAT MORPH BLD: NORMAL — SIGNIFICANT CHANGE UP
PLATELET # BLD AUTO: 237 K/UL — SIGNIFICANT CHANGE UP (ref 150–400)
POIKILOCYTOSIS BLD QL AUTO: SLIGHT — SIGNIFICANT CHANGE UP
POLYCHROMASIA BLD QL SMEAR: SLIGHT — SIGNIFICANT CHANGE UP
POTASSIUM SERPL-MCNC: 4.5 MMOL/L — SIGNIFICANT CHANGE UP (ref 3.5–5.3)
POTASSIUM SERPL-SCNC: 4.5 MMOL/L — SIGNIFICANT CHANGE UP (ref 3.5–5.3)
PROT SERPL-MCNC: 6.9 G/DL — SIGNIFICANT CHANGE UP (ref 6–8.3)
RBC # BLD: 4.39 M/UL — SIGNIFICANT CHANGE UP (ref 4.2–5.8)
RBC # FLD: 20.1 % — HIGH (ref 10.3–14.5)
RBC BLD AUTO: ABNORMAL
SODIUM SERPL-SCNC: 141 MMOL/L — SIGNIFICANT CHANGE UP (ref 135–145)
TARGETS BLD QL SMEAR: SLIGHT — SIGNIFICANT CHANGE UP
URATE SERPL-MCNC: 6.2 MG/DL — SIGNIFICANT CHANGE UP (ref 3.4–8.8)
WBC # BLD: 16.08 K/UL — HIGH (ref 3.8–10.5)
WBC # FLD AUTO: 16.08 K/UL — HIGH (ref 3.8–10.5)

## 2022-10-03 PROCEDURE — 99239 HOSP IP/OBS DSCHRG MGMT >30: CPT

## 2022-10-03 PROCEDURE — 99233 SBSQ HOSP IP/OBS HIGH 50: CPT

## 2022-10-03 RX ORDER — SENNA PLUS 8.6 MG/1
2 TABLET ORAL
Qty: 0 | Refills: 0 | DISCHARGE
Start: 2022-10-03

## 2022-10-03 RX ORDER — SENNA PLUS 8.6 MG/1
2 TABLET ORAL
Qty: 60 | Refills: 0
Start: 2022-10-03 | End: 2022-11-01

## 2022-10-03 RX ADMIN — Medication 100 MILLIGRAM(S): at 16:21

## 2022-10-03 RX ADMIN — SACUBITRIL AND VALSARTAN 1 TABLET(S): 24; 26 TABLET, FILM COATED ORAL at 05:23

## 2022-10-03 RX ADMIN — APIXABAN 5 MILLIGRAM(S): 2.5 TABLET, FILM COATED ORAL at 05:33

## 2022-10-03 RX ADMIN — Medication 3 MILLILITER(S): at 11:46

## 2022-10-03 RX ADMIN — Medication 100 MILLIGRAM(S): at 11:45

## 2022-10-03 RX ADMIN — Medication 1 TABLET(S): at 11:45

## 2022-10-03 RX ADMIN — Medication 100 MILLIGRAM(S): at 05:33

## 2022-10-03 RX ADMIN — CHLORHEXIDINE GLUCONATE 1 APPLICATION(S): 213 SOLUTION TOPICAL at 11:45

## 2022-10-03 RX ADMIN — SPIRONOLACTONE 25 MILLIGRAM(S): 25 TABLET, FILM COATED ORAL at 11:46

## 2022-10-03 RX ADMIN — Medication 400 MILLIGRAM(S): at 05:23

## 2022-10-03 NOTE — PROGRESS NOTE ADULT - PROBLEM/PLAN-3
DISPLAY PLAN FREE TEXT
Yes
DISPLAY PLAN FREE TEXT

## 2022-10-03 NOTE — DISCHARGE NOTE NURSING/CASE MANAGEMENT/SOCIAL WORK - NSDCPEFALRISK_GEN_ALL_CORE
For information on Fall & Injury Prevention, visit: [email or web address] OR  [email or web address] OR  https://www.cdc.gov/steadi/patient.html

## 2022-10-03 NOTE — PROGRESS NOTE ADULT - REASON FOR ADMISSION
Confusion

## 2022-10-03 NOTE — DISCHARGE NOTE NURSING/CASE MANAGEMENT/SOCIAL WORK - NSDCPEPT PROEDHF_GEN_ALL_CORE
Dr. Patel Gibson,     Thank you for ordering   REF94 - AMB REFERRAL/CONSULT TO PULMONOLOGY for patient Tyesha Sen, MRN [id number]. Unfortunately, the patient's insurance, [employer], has denied the service due to Facility/POS Not in Patient's Network, N/A. This is an automated In Basket notification that does not require a reply. For a more detailed explanation, or for questions regarding this insurance denial, send an In Basket message to the Pre Service Intake pool or call the [facility] Pre-Service department at [phone] and reference referral ID [id number].The Pre-Service department hours are M-F 8 a.m. to 5 p.m.       Thank you,     [facility]    I attempted to contact the patient and left a voicemail for her to call back.  
Call primary care provider for follow up after discharge/Activities as tolerated/Low salt diet/Monitor weight daily/Report signs and symptoms to primary care provider

## 2022-10-03 NOTE — PROGRESS NOTE ADULT - SUBJECTIVE AND OBJECTIVE BOX
INTERVAL HPI/OVERNIGHT EVENTS:  Patient S&E at bedside. No acute o/n events. Denied fever/chills, headache, N/V/D, SOB, chest pain, abd pain, changes with urination.       VITAL SIGNS:  T(F): 97.4 (10-03-22 @ 05:25)  HR: 70 (10-03-22 @ 05:25)  BP: 136/78 (10-03-22 @ 05:25)  RR: 18 (10-03-22 @ 05:25)  SpO2: 97% (10-03-22 @ 05:25)  Wt(kg): --    PHYSICAL EXAM:    Constitutional: NAD  Eyes: EOMI, sclera non-icteric  Neck: supple  Respiratory: CTAB, no wheezes or crackles   Cardiovascular: RRR  Gastrointestinal: soft, NTND, + BS  Extremities: no cyanosis, clubbing or edema   Neurological: awake and alert      MEDICATIONS  (STANDING):  acyclovir   Oral Tab/Cap 400 milliGRAM(s) Oral two times a day  albuterol/ipratropium for Nebulization 3 milliLiter(s) Nebulizer every 6 hours  allopurinol 100 milliGRAM(s) Oral daily  apixaban 5 milliGRAM(s) Oral every 12 hours  atorvastatin 40 milliGRAM(s) Oral at bedtime  chlorhexidine 2% Cloths 1 Application(s) Topical daily  melatonin 3 milliGRAM(s) Oral at bedtime  metoprolol succinate  milliGRAM(s) Oral daily  multivitamin 1 Tablet(s) Oral daily  predniSONE   Tablet 100 milliGRAM(s) Oral every 24 hours  sacubitril 49 mG/valsartan 51 mG 1 Tablet(s) Oral two times a day  spironolactone 25 milliGRAM(s) Oral <User Schedule>    MEDICATIONS  (PRN):  guaiFENesin Oral Liquid (Sugar-Free) 100 milliGRAM(s) Oral every 6 hours PRN Cough  senna 2 Tablet(s) Oral at bedtime PRN Constipation  valproate sodium Injectable. 125 milliGRAM(s) IV Push once PRN Agitation at night      Allergies    rasburicase (Other)    Intolerances        LABS:                        11.8   16.08 )-----------( 237      ( 03 Oct 2022 07:15 )             39.0     10-03    141  |  104  |  37<H>  ----------------------------<  213<H>  4.5   |  26  |  1.00    Ca    9.3      03 Oct 2022 07:17  Phos  3.2     10-03  Mg     1.9     10-03    TPro  6.9  /  Alb  3.8  /  TBili  0.6  /  DBili  x   /  AST  23  /  ALT  29  /  AlkPhos  157<H>  10-03          RADIOLOGY & ADDITIONAL TESTS:  Studies reviewed.

## 2022-10-03 NOTE — PROGRESS NOTE ADULT - PROBLEM SELECTOR PLAN 2
- History of DLBCL (tx with R-CHOP in 2003, with relapse in 2009 treated with BR) now with recently diagnosed grade 3 follicular lymphoma on treatment with a modified regimen of mini-COEP plus Obinutuzumab  - Hematology consulted. Appreciate recs   - C1 Started on 9/1/22 (cycle length q21 days) but full dose obinutuzumab was started 9/2/22. Second dose on 9/9/22  - He was due for his 3rd weekly dose of obinutuzumab of cycle 1 (9/16/22) but now on hold, pending further workup of encephalopathy  - per Onc (9/29), daughter amenable to PICC initiating chemotherapy after placement; mediport placement for outpt chemo tx after dc  - Tx with C2 O-mini-COEP planned 9/30  - Received low dose etopiside Day 3 on 10/1

## 2022-10-03 NOTE — PROGRESS NOTE ADULT - PROBLEM SELECTOR PROBLEM 1
Altered mental status
Diffuse large B cell lymphoma
Altered mental status
Diffuse large B cell lymphoma
Altered mental status

## 2022-10-03 NOTE — PROGRESS NOTE ADULT - ASSESSMENT
72M hx DLBCL (tx with R-CHOP in 2003, with relapse of DLBCL in 2009 treated with BR) now with multiple areas of palpable lymphadenopathy with biopsies shown to be relapse of DLBCL. Recent hospitalization for hemolysis due to G6PD deficiency and rasburicase. Had been started on O-COEP last admission. Now presenting with encephalopathy.    #Encephalopathy  - Workup for metabolic causes has been negative thus far  - entero/rhinovirus+ but rest of infectious workup neg  - MRI brain without acute pathology  - EEG neg for seizures  - s/p LP, flow cytometry insufficient cells to report  - appreciate neuro recs  - based on above, does not appear to have gross CNS involvement by DLBCL    #Relapsed Diffuse Large B-Cell Lymphoma  Follows with Dr. Cordova at [facility]. Originally diagnosed in 2003 s/p RCHOP with relapse in 2009 s/p BR. 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 During last admission, had excisional biopsy but was performed after starting treatment.  - s/p excisional biopsy of back lesion on 9/7/22 showing DLBCL  - Patient is on treatment with a modified regimen of mini-COEP plus Obinutuzumab. C1 Started on 9/1/22 (cycle length q21 days) but full dose obinutuzumab was started 9/2/22.  - C2 O-mini-COEP D1 started on 09/30; today is Day 4. Tolerated well.   - obi 1000mg D1  - cyclophosphamide 400mg/m2 D1  - vincristine 2mg flat dose D1  - etoposide 30mg/m2 IV D1, 60mg/m2 PO D2,3  - prednisone 100mg PO daily D1-5  - will need outpatient GCSF appt on D4  - PICC placed 09/29, please remove prior to d/c. Will arrange for mediport outpatient by his Hematologist Dr. Cordova at [facility]   - on discharge:  - oral etoposide for 10/01 and 10/02 prescribed to [facility] who will deliver it to [facility] 09/30 afternoon for family to  from pharmacy  - please prescribed prednisone to finish 5d course as above  -  Neulasta appt initially set up at [facility] on 10/03 by cancelled due to insurance issue. Hematology team communicated with Dr. Cordova: will closely monitor as outpt; no plan to give Neulasta for now since the doses of Chemo are minimal. Further eval and treatment as outpt. Hematology team discuss with pt and his daughter, and they agree with the plan.   - outpatient f/u with Dr. Cordova at [facility]    Bob Barnett PGY5   Hem & Onc fellow p [phone] 72M hx DLBCL (tx with R-CHOP in 2003, with relapse of DLBCL in 2009 treated with BR) now with multiple areas of palpable lymphadenopathy with biopsies shown to be relapse of DLBCL. Recent hospitalization for hemolysis due to G6PD deficiency and rasburicase. Had been started on O-COEP last admission. Now presenting with encephalopathy.    #Encephalopathy  - Workup for metabolic causes has been negative thus far  - entero/rhinovirus+ but rest of infectious workup neg  - MRI brain without acute pathology  - EEG neg for seizures  - s/p LP, flow cytometry insufficient cells to report  - appreciate neuro recs  - based on above, does not appear to have gross CNS involvement by DLBCL    #Relapsed Diffuse Large B-Cell Lymphoma  Follows with Dr. Cordova at [facility]. Originally diagnosed in 2003 s/p RCHOP with relapse in 2009 s/p BR. 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 During last admission, had excisional biopsy but was performed after starting treatment.  - s/p excisional biopsy of back lesion on 9/7/22 showing DLBCL  - Patient is on treatment with a modified regimen of mini-COEP plus Obinutuzumab. C1 Started on 9/1/22 (cycle length q21 days) but full dose obinutuzumab was started 9/2/22.  - C2 O-mini-COEP D1 started on 09/30; today is Day 4. Tolerated well.   - obi 1000mg D1  - cyclophosphamide 400mg/m2 D1  - vincristine 2mg flat dose D1  - etoposide 30mg/m2 IV D1, 60mg/m2 PO D2,3  - prednisone 100mg PO daily D1-5  - will need outpatient GCSF appt on D4  - PICC placed 09/29, please remove PICC line prior to d/c. Will arrange for mediport outpatient by his Hematologist Dr. Cordova at [facility]   - on discharge:  - oral etoposide for 10/01 and 10/02 prescribed to [facility] who will deliver it to [facility] 09/30 afternoon for family to  from pharmacy  - please prescribed prednisone to finish 5d course as above  -  Neulasta appt initially set up at [facility] on 10/03 by cancelled due to insurance issue. Hematology team communicated with Dr. Cordova: will closely monitor as outpt; no plan to give Neulasta for now since the doses of Chemo are minimal. Further eval and treatment as outpt. Hematology team discuss with pt and his daughter, and they agree with the plan.   - outpatient f/u with Dr. Cordova at [facility]    Bob Barnett PGY5   Hem & Onc fellow p [phone]

## 2022-10-03 NOTE — PROGRESS NOTE ADULT - PROVIDER SPECIALTY LIST ADULT
1. Type 2 diabetes mellitus without complication, without long-term current use of insulin (HCC)  - controlled  -Continue same medications  -Limit carbohydrates to 45 grams with meals and 15 grams with snacks  -monitor blood sugars  -Regular aerobic exercise  - Comprehensive Metabolic Panel; Future  - POCT glycosylated hemoglobin (Hb A1C)    2. Essential hypertension  -stable  -Continue same medications  -Low sodium diet  -Regular aerobic exercise  -Comprehensive Metabolic Panel; Future    3. Mixed hyperlipidemia  -Continue same medications  -Low fat, low cholesterol diet  -Regular aerobic exercise  - Lipid Panel; Future  - Comprehensive Metabolic Panel; Future    4. Moderate persistent asthma without complication  -stable  -Continue same medications    5. Depression, unspecified depression type  -stable  -Continue same medications    6. Anxiety  -stable  -Continue same medications    7. Vitamin D deficiency  -stable  -Continue same medications  - Vitamin D 25 Hydroxy; Future    8.  Bilateral hip pain  -Ibuprofen 800mg 2- 3 times daily   -return to office if no improvement in the next 7-10 days
Internal Medicine
Internal Medicine
Heme/Onc
Neurology
Heme/Onc
Heme/Onc
Neurology
Neuroradiology
Internal Medicine
Internal Medicine
Heme/Onc
Internal Medicine

## 2022-10-03 NOTE — DISCHARGE NOTE NURSING/CASE MANAGEMENT/SOCIAL WORK - PATIENT PORTAL LINK FT
You can access the FollowMyHealth Patient Portal offered by [facility] by registering at the following website: [email or web address]. By joining [facility]’s FollowMyHealth portal, you will also be able to view your health information using other applications (apps) compatible with our system.

## 2022-10-03 NOTE — PROGRESS NOTE ADULT - ATTENDING SUPERVISION STATEMENT
Fellow
Resident

## 2022-10-03 NOTE — PROGRESS NOTE ADULT - SUBJECTIVE AND OBJECTIVE BOX
Internal Medicine   Gisele Nyia | PGY-1    OVERNIGHT EVENTS: No acute overnight events.    SUBJECTIVE:       MEDICATIONS  (STANDING):  acyclovir   Oral Tab/Cap 400 milliGRAM(s) Oral two times a day  albuterol/ipratropium for Nebulization 3 milliLiter(s) Nebulizer every 6 hours  allopurinol 100 milliGRAM(s) Oral daily  apixaban 5 milliGRAM(s) Oral every 12 hours  atorvastatin 40 milliGRAM(s) Oral at bedtime  chlorhexidine 2% Cloths 1 Application(s) Topical daily  melatonin 3 milliGRAM(s) Oral at bedtime  metoprolol succinate  milliGRAM(s) Oral daily  multivitamin 1 Tablet(s) Oral daily  predniSONE   Tablet 100 milliGRAM(s) Oral every 24 hours  sacubitril 49 mG/valsartan 51 mG 1 Tablet(s) Oral two times a day  spironolactone 25 milliGRAM(s) Oral <User Schedule>    MEDICATIONS  (PRN):  guaiFENesin Oral Liquid (Sugar-Free) 100 milliGRAM(s) Oral every 6 hours PRN Cough  senna 2 Tablet(s) Oral at bedtime PRN Constipation  valproate sodium Injectable. 125 milliGRAM(s) IV Push once PRN Agitation at night        T(F): 97.4 (10-03-22 @ 05:25), Max: 98.8 (10-02-22 @ 22:24)  HR: 70 (10-03-22 @ 05:25) (70 - 88)  BP: 136/78 (10-03-22 @ 05:25) (121/69 - 136/78)  BP(mean): --  RR: 18 (10-03-22 @ 05:25) (16 - 18)  SpO2: 97% (10-03-22 @ 05:25) (97% - 100%)    PHYSICAL EXAM:     GENERAL: NAD, lying in bed comfortably  HEAD:  Atraumatic, Normocephalic  EYES: EOMI, PERRLA, conjunctiva and sclera clear, no nystagmus noted  ENT: Moist mucous membranes,   NECK: Supple, No JVD, trachea midline  CHEST/LUNG: Clear to auscultation bilaterally; No rales, rhonchi, wheezing, or rubs. Unlabored respirations  HEART: Regular rate and rhythm; No murmurs, rubs, or gallops, normal S1/S2  ABDOMEN: normal bowel sounds; Soft, nontender, nondistended, no organomegaly   EXTREMITIES:  2+ Peripheral Pulses, brisk capillary refill. No clubbing, cyanosis, or edema  MSK: No gross deformities noted   Neurological:  A&Ox3, no focal deficits   SKIN: No rashes or lesions  PSYCH: Normal mood, affect     TELEMETRY:    LABS:                        11.1   18.60 )-----------( 247      ( 02 Oct 2022 07:13 )             37.1     10-02    139  |  104  |  36<H>  ----------------------------<  190<H>  4.6   |  26  |  1.07    Ca    9.1      02 Oct 2022 07:03  Phos  3.0     10-02  Mg     1.9     10-02    TPro  6.8  /  Alb  3.8  /  TBili  0.6  /  DBili  x   /  AST  28  /  ALT  30  /  AlkPhos  180<H>  10-02            Creatinine Trend: 1.07<--, 1.03<--, 1.08<--, 1.00<--, 1.08<--, 1.12<--  I&O's Summary    BNP    RADIOLOGY & ADDITIONAL STUDIES:             Internal Medicine   Gisele Ro | PGY-1    OVERNIGHT EVENTS: No acute overnight events.    SUBJECTIVE: Patient was seen and examined at bedside this morning. Pt AOx1. Denies any nausea/vomiting/diarrhea, headache, shortness of breath, abdominal pain or chest pain/palpitations. Vital signs/imaging/telemetry events reviewed.       MEDICATIONS  (STANDING):  acyclovir   Oral Tab/Cap 400 milliGRAM(s) Oral two times a day  albuterol/ipratropium for Nebulization 3 milliLiter(s) Nebulizer every 6 hours  allopurinol 100 milliGRAM(s) Oral daily  apixaban 5 milliGRAM(s) Oral every 12 hours  atorvastatin 40 milliGRAM(s) Oral at bedtime  chlorhexidine 2% Cloths 1 Application(s) Topical daily  melatonin 3 milliGRAM(s) Oral at bedtime  metoprolol succinate  milliGRAM(s) Oral daily  multivitamin 1 Tablet(s) Oral daily  predniSONE   Tablet 100 milliGRAM(s) Oral every 24 hours  sacubitril 49 mG/valsartan 51 mG 1 Tablet(s) Oral two times a day  spironolactone 25 milliGRAM(s) Oral <User Schedule>    MEDICATIONS  (PRN):  guaiFENesin Oral Liquid (Sugar-Free) 100 milliGRAM(s) Oral every 6 hours PRN Cough  senna 2 Tablet(s) Oral at bedtime PRN Constipation  valproate sodium Injectable. 125 milliGRAM(s) IV Push once PRN Agitation at night        T(F): 97.4 (10-03-22 @ 05:25), Max: 98.8 (10-02-22 @ 22:24)  HR: 70 (10-03-22 @ 05:25) (70 - 88)  BP: 136/78 (10-03-22 @ 05:25) (121/69 - 136/78)  BP(mean): --  RR: 18 (10-03-22 @ 05:25) (16 - 18)  SpO2: 97% (10-03-22 @ 05:25) (97% - 100%)    PHYSICAL EXAM:     GENERAL: NAD, lying in bed comfortably  HEAD:  Atraumatic, Normocephalic  EYES: EOMI, PERRLA, conjunctiva and sclera clear, no nystagmus noted  ENT: Moist mucous membranes,   NECK: Supple, No JVD, trachea midline  CHEST/LUNG: Clear to auscultation bilaterally; No rales, rhonchi, wheezing, or rubs. Unlabored respirations  HEART: Regular rate and rhythm; No murmurs, rubs, or gallops, normal S1/S2  ABDOMEN: normal bowel sounds; Soft, nontender, nondistended, no organomegaly   EXTREMITIES:  2+ Peripheral Pulses, brisk capillary refill. No clubbing, cyanosis, or edema  MSK: No gross deformities noted   Neurological:  A&Ox3, no focal deficits   SKIN: No rashes or lesions  PSYCH: Normal mood, affect     TELEMETRY:    LABS:                        11.1   18.60 )-----------( 247      ( 02 Oct 2022 07:13 )             37.1     10-02    139  |  104  |  36<H>  ----------------------------<  190<H>  4.6   |  26  |  1.07    Ca    9.1      02 Oct 2022 07:03  Phos  3.0     10-02  Mg     1.9     10-02    TPro  6.8  /  Alb  3.8  /  TBili  0.6  /  DBili  x   /  AST  28  /  ALT  30  /  AlkPhos  180<H>  10-02            Creatinine Trend: 1.07<--, 1.03<--, 1.08<--, 1.00<--, 1.08<--, 1.12<--  I&O's Summary    BNP    RADIOLOGY & ADDITIONAL STUDIES:

## 2022-10-03 NOTE — DISCHARGE NOTE NURSING/CASE MANAGEMENT/SOCIAL WORK - NSDCFUADDAPPT_GEN_ALL_CORE_FT
APPTS ARE READY TO BE MADE: [X] YES    Best Family or Patient Contact (if needed):  Vonnie Skaggs (Daughter): [phone]    Additional Information about above appointments (if needed):    1: Please follow up with Oncology at [facility] on  at  for your Neulasta appointment.   2: Please follow up with [facility] within 2 weeks of being discharged for outpatient Psychiatry follow up.  3: Please follow up with Neurology within 2 weeks of being discharged for further outpatient treatment as needed.  4: Please follow up with Dr. Hickman on 10/26 to manage your primary care needs after discharge.   5: Please follow up with Electrophysiology on 10/13 for further management of your pacemaker.   6: Please follow up with Dr. Cordova within 2 weeks of being discharged for further management of your lymphoma    Other comments or requests:   3 attempts were made to reach patient, which have been unsuccessful. 3 Voicemails have been left 10/1, 10/2, and 10/3. Will await a call back from patient to coordinate follow up  care with Dr. Cordova, [facility], and Neurology Clinic.

## 2022-10-03 NOTE — PROGRESS NOTE ADULT - PROBLEM SELECTOR PLAN 5
- Appears overall euvolemic on exam   - Pro-BNP 81559 <- 81189 (from last admission), troponin 41   - HFrEF with TTE from 09/2022 with EF 30%, severe global left ventricular systolic dysfunction. Mild RV enlargement with decreased RV systolic function    Plan:   - Continue home medications with hold parameters   - Metoprolol succinate 100 mg PO QD  - Entresto 49/51 PO BID with hold parameters   - spironolactone 25 mg PO QD  - Consider SGLT2 as outpatient

## 2022-10-03 NOTE — PROGRESS NOTE ADULT - ASSESSMENT
72 year old male with afib (on eliquis), HTN,  complete heart block s/p PPM, HLD, HFrEF (30% in 09/2022), and DLBCL (tx with R-CHOP in 2003, with relapse in 2009 treated with BR) now with recently diagnosed grade 3 follicular lymphoma who was transferred from [facility] for acute on chronic encephalopathy likely 2/2 rhino/entero viral pneumonia i/s/o follicular lymphoma and ongoing chemotherapy treatment. Possible etiologies include drug induced 2/2 chemo vs autoimmune vs paraneoplastic.

## 2022-10-03 NOTE — PROGRESS NOTE ADULT - NUTRITIONAL ASSESSMENT
This patient has been assessed with a concern for Malnutrition and has been determined to have a diagnosis/diagnoses of Severe protein-calorie malnutrition.    This patient is being managed with:   Diet Regular-  Low Sodium  Supplement Feeding Modality:  Oral  Ensure Enlive Cans or Servings Per Day:  1       Frequency:  Two Times a day  Entered: Sep 28 2022 12:32PM    

## 2022-10-03 NOTE — PROGRESS NOTE ADULT - ATTENDING COMMENTS
72M hx DLBCL (tx with R-CHOP in 2003, with relapse of DLBCL in 2009 treated with BR) now with multiple areas of palpable lymphadenopathy with biopsies shown to be relapse of DLBCL. Recent hospitalization for hemolysis due to G6PD deficiency and rasburicase. Had been started on O-COEP last admission. Now presenting with encephalopathy.      #Relapsed Diffuse Large B-Cell Lymphoma  - s/p excisional biopsy of back lesion on 9/7 showing DLBCL  - Patient is on treatment with a modified regimen of mini-COEP plus Obinutuzumab. C1 Started on 9/1/22 (cycle length q21 days) but full dose obinutuzumab was started 9/2/22.  - plan on treating with C2 O-mini-COEP 09/30  -Reschedule neulasta.

## 2022-10-03 NOTE — PROGRESS NOTE ADULT - PROBLEM SELECTOR PLAN 1
- Daughter notes that patient is A&Ox3 at baseline but had episodes of confusion at times, mostly during last hospitalization.   - Etiology may be underlying dementia, delirium, vs drug induced i/s/o chemotherapy vs metabolic causes vs neurological vs malignancy  - CTH stable chronic infarct without acute findings  - Consider CNS involvement in lymphoma with leptomeningeal disease or side effects from the medications     Plan:  - Supportive care of rhino-enteroviral pneumonia  - CXR with loculated effusion minimally increased from 08/23. However, not seen on CXR from 09/2022.   - CT chest with no acute findings   - infectious workup negative    - Check syphilis screen, TSH, B12: all normal   - MRI with chronic right posterior parietal stroke without significant interval change.   - EEG with Mild nonspecific diffuse cerebral dysfunction; No overt asymmetry despite known lesion; There were no epileptiform abnormalities recorded.   - LP with lymphoid cells    - Eliquis restarted 9/29  - PICC line placed 9/29; will receive chemo tx 9/30 - Daughter notes that patient is A&Ox3 at baseline but had episodes of confusion at times, mostly during last hospitalization.   - Etiology may be underlying dementia, delirium, vs drug induced i/s/o chemotherapy vs metabolic causes vs neurological vs malignancy  - CTH stable chronic infarct without acute findings  - Consider CNS involvement in lymphoma with leptomeningeal disease or side effects from the medications     Plan:  - Supportive care of rhino-enteroviral pneumonia  - CXR with loculated effusion minimally increased from 08/23. However, not seen on CXR from 09/2022.   - CT chest with no acute findings   - infectious workup negative    - Check syphilis screen, TSH, B12: all normal   - MRI with chronic right posterior parietal stroke without significant interval change.   - EEG with Mild nonspecific diffuse cerebral dysfunction; No overt asymmetry despite known lesion; There were no epileptiform abnormalities recorded.   - LP with lymphoid cells    - Eliquis restarted 9/29  - PICC line placed 9/29; will receive chemo tx 9/30-10/3 inpatient

## 2022-10-04 ENCOUNTER — NON-APPOINTMENT (OUTPATIENT)
Age: 72
End: 2022-10-04

## 2022-10-04 LAB
AMPA-R AB CBA, CSF: NEGATIVE — SIGNIFICANT CHANGE UP
AMPHIPHYSIN AB TITR CSF: NEGATIVE TITER — SIGNIFICANT CHANGE UP
CASPR2-IGG CBA, CSF: NEGATIVE — SIGNIFICANT CHANGE UP
CV2 IGG TITR CSF: NEGATIVE TITER — SIGNIFICANT CHANGE UP
DPPX ANTIBODY IFA, CSF: NEGATIVE — SIGNIFICANT CHANGE UP
GABA-B-R AB CBA, CSF: NEGATIVE — SIGNIFICANT CHANGE UP
GAD65 AB CSF-SCNC: 0 NMOL/L — SIGNIFICANT CHANGE UP
GFAP IFA, CSF: NEGATIVE — SIGNIFICANT CHANGE UP
GLIAL NUC TYPE 1 AB TITR CSF: NEGATIVE TITER — SIGNIFICANT CHANGE UP
H CAPSUL AG CSF IA-MCNC: SIGNIFICANT CHANGE UP
HU1 AB TITR CSF IF: NEGATIVE TITER — SIGNIFICANT CHANGE UP
HU2 AB TITR CSF IF: NEGATIVE TITER — SIGNIFICANT CHANGE UP
HU3 AB TITR CSF: NEGATIVE TITER — SIGNIFICANT CHANGE UP
IGLON5 IFA, CSF: NEGATIVE — SIGNIFICANT CHANGE UP
IMMUNOLOGIST REVIEW: SIGNIFICANT CHANGE UP
INNER EAR 68KD AB FLD QL: <1.5 U/L — SIGNIFICANT CHANGE UP (ref 0–3.1)
LGI1-IGG CBA, CSF: NEGATIVE — SIGNIFICANT CHANGE UP
MGLUR1 AB IFA, CSF: NEGATIVE — SIGNIFICANT CHANGE UP
NIF IFA, CSF: NEGATIVE — SIGNIFICANT CHANGE UP
NMDA-R AB CBA, CSF: NEGATIVE — SIGNIFICANT CHANGE UP
PCA-TR AB TITR CSF: NEGATIVE TITER — SIGNIFICANT CHANGE UP
PURKINJE CELL CYTOPLASMIC AB TYPE 2: NEGATIVE TITER — SIGNIFICANT CHANGE UP
PURKINJE CELLS AB TITR CSF IF: NEGATIVE TITER — SIGNIFICANT CHANGE UP
REFLEX ADDED: SIGNIFICANT CHANGE UP

## 2022-10-05 LAB — ADMARK PHOSPHO-TAU/TOTAL-TA RESULT: SIGNIFICANT CHANGE UP

## 2022-10-10 ENCOUNTER — APPOINTMENT (OUTPATIENT)
Dept: CARDIOLOGY | Facility: CLINIC | Age: 72
End: 2022-10-10

## 2022-10-10 ENCOUNTER — NON-APPOINTMENT (OUTPATIENT)
Age: 72
End: 2022-10-10

## 2022-10-10 ENCOUNTER — APPOINTMENT (OUTPATIENT)
Dept: INTERNAL MEDICINE | Facility: CLINIC | Age: 72
End: 2022-10-10

## 2022-10-10 VITALS
DIASTOLIC BLOOD PRESSURE: 83 MMHG | HEART RATE: 78 BPM | SYSTOLIC BLOOD PRESSURE: 130 MMHG | WEIGHT: 143 LBS | OXYGEN SATURATION: 100 % | BODY MASS INDEX: 25.33 KG/M2

## 2022-10-10 VITALS
BODY MASS INDEX: 21.48 KG/M2 | WEIGHT: 145 LBS | SYSTOLIC BLOOD PRESSURE: 140 MMHG | DIASTOLIC BLOOD PRESSURE: 60 MMHG | HEIGHT: 69 IN

## 2022-10-10 DIAGNOSIS — R53.83 OTHER FATIGUE: ICD-10-CM

## 2022-10-10 PROCEDURE — 99215 OFFICE O/P EST HI 40 MIN: CPT | Mod: 25

## 2022-10-10 PROCEDURE — 36415 COLL VENOUS BLD VENIPUNCTURE: CPT

## 2022-10-10 PROCEDURE — G0008: CPT

## 2022-10-10 PROCEDURE — 93000 ELECTROCARDIOGRAM COMPLETE: CPT

## 2022-10-10 PROCEDURE — 99214 OFFICE O/P EST MOD 30 MIN: CPT | Mod: 25

## 2022-10-10 PROCEDURE — 90662 IIV NO PRSV INCREASED AG IM: CPT

## 2022-10-10 NOTE — CARDIOLOGY SUMMARY
[LVEF ___%] : LVEF [unfilled]% [Mild] : mild LV dysfunction [Moderate] : moderate pulmonary hypertension [Enlarged] : enlarged LA size [Severe] : severe mitral regurgitation [___] : [unfilled] [de-identified] : 6/24/2021 sinus rhythm with bi-ventricular paced rhythm\par 12/23/2021 mild sinus tachycardia, P-sense, biventricular paced.\par 5/24/2022 sinus rhythm 64,  P-sense and biventricular paced.\par 6/13/2022 sinus tachycardia 108, P-sense and biventricular pace.\par 7/6/2022  probable atrial tachycardia with biventricular pacing 2:1, heart rate 108.\par 8/2/2022 sinus tachycardia and biventricular paced at 102.\par 10/10/2022  [de-identified] : 6/24/2021 mild to moderate global LV dysfunction with dilated and hypertrophied LV, left atrial enlargement, normal right heart with device wire, mild to moderate MR. Compared to 11/7/2019 hospital study pulmonary hypertension improved and all other findings unchanged.

## 2022-10-10 NOTE — DISCUSSION/SUMMARY
[Patient] : the patient [EKG obtained to assist in diagnosis and management of assessed problem(s)] : EKG obtained to assist in diagnosis and management of assessed problem(s) 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 [Coronary Artery Disease] : coronary artery disease [None] : There are no changes in medication management [Systolic Heart Failure] : systolic heart failure [Compensated] : compensated [Essential Hypertension] : essential hypertension [Responding to Treatment] : responding to treatment [Medication Changes Per Orders] : Medication changes are as documented in orders [de-identified] : 100% bi-ventricular paced [FreeTextEntry1] : discussed need to follow up with pacemaker monitoring. He has disconnected monitoring device and missed recent EP appointment. He and daughter will call to reschedule [de-identified] : sharifa [de-identified] : replaced furosemide with torsemide, but with multiple hospital stays now off all diuretic and has no edema [de-identified] : in association with multiple severe illnesses including follicular lymphoma undergoing chemotherapy blood pressure currently not elevated [de-identified] : off diuretics and off ACE-i [de-identified] : Too many instances of failing to take medications, today he says it is because going to internist and instructed to not eat for blood tests [FreeTextEntry2] : and daughter

## 2022-10-10 NOTE — HISTORY OF PRESENT ILLNESS
[FreeTextEntry1] : History of CHF, a flutter, hypertension, hyperlipidemia, high glucose, CKD, lymphoma, and anemia [de-identified] : fatigue\par

## 2022-10-10 NOTE — PHYSICAL EXAM
[No Acute Distress] : no acute distress [No Respiratory Distress] : no respiratory distress  [No Accessory Muscle Use] : no accessory muscle use [Clear to Auscultation] : lungs were clear to auscultation bilaterally [Normal Rate] : normal rate  [Soft] : abdomen soft

## 2022-10-10 NOTE — ASSESSMENT
[FreeTextEntry1] : Lymphoma-as per hematologist\par CHF-stable.  Continue spironolactone 25 mg daily.  Followed by cardiologist\par Atrial flutter-continue Eliquis 5 mg twice daily.  Continue metoprolol  mg daily.\par Hypertension-continue metoprolol  mg daily.  Follow-up blood pressure\par Hyperlipidemia-continue Lipitor 40 mg daily.  Follow lipid\par Follow hemoglobin A1c\par CBC\par BUN/creatinine\par Elevated uric acid-continue allopurinol 100 mg daily.  Uric acid\par Blood was drawn at office today.\par Flu vaccine today\par COVID-19 omicron specific booster vaccine was recommended to the patient.\par CPE in February 2023\par Thyroid nodules-thyroid ultrasound in August 2023 ([place])\par All medications were reviewed with patient.\par

## 2022-10-10 NOTE — PHYSICAL EXAM
[Elevated JVD ____cm] : elevated JVD ~Vcm [Not Palpable] : not palpable [Normal Rate] : normal [Rhythm Regular] : regular [Normal S1] : normal S1 [Normal S2] : normal S2 [II] : a grade 2 [Holosystolic] : holosystolic [[place]] : the murmur was transmitted to the apex [2+] : left 2+ [1+] : left 1+ [0] : left 0 [Dullness ____] : dullness [unfilled] [Normal] : alert and oriented, normal memory [No Pitting Edema] : no pitting edema present [Right Carotid Bruit] : no bruit heard over the right carotid [de-identified] : left infraclavicular pacemaker pocket [Left Carotid Bruit] : no bruit heard over the left carotid

## 2022-10-10 NOTE — HISTORY OF PRESENT ILLNESS
[FreeTextEntry1] : Mr. 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 He was roughly 30 lbs above his last discharge weight and the trigger is not taking his medications for 3 months because he felt well and did not refill prescriptions. He was also found to have atrial flutter which was a new diagnosis. Successfully diuresed 40 lbs in hospital and underwent CRT-P upgrade from Micra due to high pacing burden. Had TE guided cardioversion and remained in sinus rhythm. Continues to feel well, now fully active, able to walk quickly up flights of stairs, walk good distances all without dyspnea and denies orthopnea. \par \par At a prior visit found serum potassium elevated and endocrine had instructed to increase spironolactone, but contacted him to remain unchanged and avoid high potassium sources in diet. Since then doing very well, active, no dyspnea, there is no orthopnea or paroxysmal nocturnal dyspnea. Walks regularly, maybe half mile to and from stores.\par \par Since last seen doing extremely well, no symptoms, walking outdoors for exercise. However, apixaban became too expensive (Medicare D "donut hole") and has not been taking.\par \par Recently weight up, not feeling well, no fever, saw internist, treated with antibiotic for pneumonia and feeling better. However at pacemaker interrogation Optivol indicated volume excess. There is no orthopnea or paroxysmal nocturnal dyspnea At last visit observed good response to diuretic, making large amount of urine and feeling better, however Optivol still elevated and had runs of NSVT. Developed several prominent lymph nodes and biopsy  planned, off apixaban. Biopsy accomplished, diagnosis seems to be follicular lymphoma. Resumed apixaban.\par \par Referred to EP to consider possible atrial tachycardia, flutter, but concluded was sinus tachycardia. He is short of breath on minimal exertion, but denies orthopnea or paroxysmal nocturnal dyspnea. One week ago switched diuretic to torsemide and dramatic response.\par \par Admitted to hospital multiple times since last visit. Had excision of malignant mass on back, diagnosis of follicular lymphoma, started chemotherapy then admitted mid September and remained until October 3 for altered mental status.

## 2022-10-10 NOTE — REVIEW OF SYSTEMS
[Lower Ext Edema] : lower extremity edema [Negative] : Heme/Lymph [Weight Loss (___ Lbs)] : [unfilled] ~Ulb weight loss [SOB] : no shortness of breath [Dyspnea on exertion] : not dyspnea during exertion [Leg Claudication] : no intermittent leg claudication [Palpitations] : no palpitations [Orthopnea] : no orthopnea [PND] : no PND

## 2022-10-10 NOTE — REASON FOR VISIT
[Cardiac Failure] : cardiac failure [Arrhythmia/ECG Abnorrmalities] : arrhythmia/ECG abnormalities [Hypertension] : hypertension [Coronary Artery Disease] : coronary artery disease [Carotid, Aortic and Peripheral Vascular Disease] : carotid, aortic and peripheral vascular disease [Other: ____] : [unfilled] [Family Member] : family member

## 2022-10-13 ENCOUNTER — NON-APPOINTMENT (OUTPATIENT)
Age: 72
End: 2022-10-13

## 2022-10-13 ENCOUNTER — APPOINTMENT (OUTPATIENT)
Dept: ELECTROPHYSIOLOGY | Facility: CLINIC | Age: 72
End: 2022-10-13

## 2022-10-13 PROCEDURE — 93294 REM INTERROG EVL PM/LDLS PM: CPT

## 2022-10-13 PROCEDURE — 93296 REM INTERROG EVL PM/IDS: CPT

## 2022-10-14 ENCOUNTER — OUTPATIENT (OUTPATIENT)
Dept: OUTPATIENT SERVICES | Facility: HOSPITAL | Age: 72
LOS: 1 days | Discharge: HOME | End: 2022-10-14

## 2022-10-14 ENCOUNTER — APPOINTMENT (OUTPATIENT)
Dept: INFUSION THERAPY | Facility: HOSPITAL | Age: 72
End: 2022-10-14

## 2022-10-14 DIAGNOSIS — C85.90 NON-HODGKIN LYMPHOMA, UNSPECIFIED, UNSPECIFIED SITE: Chronic | ICD-10-CM

## 2022-10-14 DIAGNOSIS — C85.90 NON-HODGKIN LYMPHOMA, UNSPECIFIED, UNSPECIFIED SITE: ICD-10-CM

## 2022-10-14 DIAGNOSIS — Z95.0 PRESENCE OF CARDIAC PACEMAKER: Chronic | ICD-10-CM

## 2022-10-17 ENCOUNTER — LABORATORY RESULT (OUTPATIENT)
Age: 72
End: 2022-10-17

## 2022-10-17 ENCOUNTER — APPOINTMENT (OUTPATIENT)
Dept: HEMATOLOGY ONCOLOGY | Facility: CLINIC | Age: 72
End: 2022-10-17
Payer: MEDICARE

## 2022-10-17 ENCOUNTER — RESULT REVIEW (OUTPATIENT)
Age: 72
End: 2022-10-17

## 2022-10-17 VITALS
OXYGEN SATURATION: 99 % | HEIGHT: 62.99 IN | HEART RATE: 60 BPM | WEIGHT: 146.61 LBS | DIASTOLIC BLOOD PRESSURE: 65 MMHG | RESPIRATION RATE: 16 BRPM | TEMPERATURE: 97.2 F | SYSTOLIC BLOOD PRESSURE: 116 MMHG | BODY MASS INDEX: 25.98 KG/M2

## 2022-10-17 LAB
BASOPHILS # BLD AUTO: 0.06 K/UL — SIGNIFICANT CHANGE UP (ref 0–0.2)
BASOPHILS NFR BLD AUTO: 1.4 % — SIGNIFICANT CHANGE UP (ref 0–2)
EOSINOPHIL # BLD AUTO: 0.23 K/UL — SIGNIFICANT CHANGE UP (ref 0–0.5)
EOSINOPHIL NFR BLD AUTO: 5.4 % — SIGNIFICANT CHANGE UP (ref 0–6)
HCT VFR BLD CALC: 37.7 % — LOW (ref 39–50)
HGB BLD-MCNC: 11.4 G/DL — LOW (ref 13–17)
IMM GRANULOCYTES NFR BLD AUTO: 0.2 % — SIGNIFICANT CHANGE UP (ref 0–0.9)
LYMPHOCYTES # BLD AUTO: 0.24 K/UL — LOW (ref 1–3.3)
LYMPHOCYTES # BLD AUTO: 5.7 % — LOW (ref 13–44)
MCHC RBC-ENTMCNC: 27.1 PG — SIGNIFICANT CHANGE UP (ref 27–34)
MCHC RBC-ENTMCNC: 30.2 G/DL — LOW (ref 32–36)
MCV RBC AUTO: 89.5 FL — SIGNIFICANT CHANGE UP (ref 80–100)
MONOCYTES # BLD AUTO: 0.44 K/UL — SIGNIFICANT CHANGE UP (ref 0–0.9)
MONOCYTES NFR BLD AUTO: 10.4 % — SIGNIFICANT CHANGE UP (ref 2–14)
NEUTROPHILS # BLD AUTO: 3.25 K/UL — SIGNIFICANT CHANGE UP (ref 1.8–7.4)
NEUTROPHILS NFR BLD AUTO: 76.9 % — SIGNIFICANT CHANGE UP (ref 43–77)
NRBC # BLD: 0 /100 WBCS — SIGNIFICANT CHANGE UP (ref 0–0)
OLIGOCLONAL BANDS CSF ELPH-IMP: SIGNIFICANT CHANGE UP
OLIGOCLONAL BANDS CSF ELPH-IMP: SIGNIFICANT CHANGE UP
PLATELET # BLD AUTO: 109 K/UL — LOW (ref 150–400)
RBC # BLD: 4.21 M/UL — SIGNIFICANT CHANGE UP (ref 4.2–5.8)
RBC # FLD: 19.1 % — HIGH (ref 10.3–14.5)
WBC # BLD: 4.23 K/UL — SIGNIFICANT CHANGE UP (ref 3.8–10.5)
WBC # FLD AUTO: 4.23 K/UL — SIGNIFICANT CHANGE UP (ref 3.8–10.5)

## 2022-10-17 PROCEDURE — 99214 OFFICE O/P EST MOD 30 MIN: CPT

## 2022-10-17 NOTE — ASSESSMENT
[FreeTextEntry1] : 71 yo male with PMHx significant for follicular lymphoma with DLBCL (tx with R-CHOP in 2003, with relapse in 2009, treated with BR) now with multiple areas of palpable lymphadenopathy at this point shown to be at least follicular lymphoma grade IIIA (IPI score 3) in the setting of a steady decline in weight > 20 lbs in the last 6 months without other constitutional complaints. 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 \par \par He underwent PET-CT in Aug 2022 which showed FDG avid lymph nodes in the left cervical, bilateral supraclavicular regions, and chest, and a few FDG avid abdominal lymph nodes, intramuscular masses in the left posterior chest and left upper thigh, scattered FDG avid subcutaneous soft tissue/nodules with trace right pleural effusion, moderate left pleural effusion, loculated fluid in the right middle lobe. Moderate abdominal and pelvic ascites. Subcutaneous edema in the lower abdominal wall extending into the imaged bilateral thighs. Splenomegaly with mild FDG avidity, suspicious for lymphomatous involvement.\par \par Prior to the PET-CT, he underwent biopsy of both a cervical lymph node and a left axilla lymph node. 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 There were areas of > 20 SUV on the PET-CT, repeat whole lymph node biopsy was requested to confirm suspected diagnosis of Grade 3B FL or transformed large cell lymphoma. S/p excisional biopsy of back lesion on 9/7/22 showing DLBCL.\par \par LP 9/26/22: protein 61, LDH 27, neutrophils 0, lymphocytes 33, monocytes 67, RBC 5. Oligoclonal bands negative.\par \par CBC today: WBC 4.23 (ANC 3.25), Hb 11.4, Plt 109\par \par Plan:\par - Cycle 3 day 1 planned for 10/21/22\par - Repeat Lymphoma labs today, PT/PTT\par - Arrange for mediport\par - Plan for interim PET scan after cycle 3\par - COVID vaccine (Pfizer: 3/2/21 & 3/23/21) and booster 9/21/21. Will attempt to get Bivalent booster 10/18 or 10/19.\par - Regimen consists of a modified regimen of mini-COEP plus Obinutuzumab: 400 mg/m² cyclophosphamide, Etoposide (40% dose reduced to 30 mg/m2 IV on day 1 and 60 mg/m2 PO on days 2 and 3 of each cycle), and 2 mg vincristine (flat dose) on day 1 of each cycle, and 100 mg prednisone on days 1–5. \par - Follow up every cycle\par \par Case and management discussed with Dr. Cordova\par \par ___\par I personally have spent a total of 45 minutes of time on the date of this encounter reviewing test results, documenting findings, providing education, coordinating care and directly consulting with the patient and/or designated family member. \par

## 2022-10-17 NOTE — PHYSICAL EXAM
[Restricted in physically strenuous activity but ambulatory and able to carry out work of a light or sedentary nature] : Status 1- Restricted in physically strenuous activity but ambulatory and able to carry out work of a light or sedentary nature, e.g., light house work, office work [de-identified] : enlarged thyroid, no lymphadenopathy appreciated [Normal] : normal spine exam without palpable tenderness, no kyphosis or scoliosis [de-identified] : +PPM

## 2022-10-17 NOTE — HISTORY OF PRESENT ILLNESS
[Disease:__________________________] : Disease: [unfilled] [Treatment Protocol] : Treatment Protocol [de-identified] : PENDING [de-identified] : 71 yo with MHx significant for Atrial flutter (on Apixaban), HTN, here for further evaluation of low-level neutrophilia (since 1/2022) and lymphadenopathy. Previously NHL (around 4529-7160?). He received chemotherapy in 2004. 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 In 2010 he went to [place] and was treated for reoccurrence and was treated with bendamustine. He reports that he has had areas of swelling in his neck on and off for about 2 years which was mostly undergoing workup with his endocrinologist. In the last few months he has noticed increasing size of his left cervical LNs and a right nodule that caused swelling of his face, which has now spontaneously decreased in size significantly.\par \par Today he reports he feels well outside of weight loss. He denies any other constitutional complaints. He weighed 192 lbs in 10/2021 which was down to 183 lbs in December 12/2021(2 months later) which has further decreased down to 179 lbs today. He has not felt himself masses outside of his neck region. On 5/14/22, he went for US duplex of his left lower extremity due to a "tangerine size lump" on the back of his left thigh, which noted a 4.4 cm hypoechoic mass in his left inguinal region without commenting on his perceived posterior thigh mass. Also, on 5/2/22 he underwent US of his thyroid and parathyroid glands where they noticed bilateral cervical lymph nodes with the largest on the left side measuring 2.1 x 1.6 x 1.9 cm and a right level 1 LN measuring 2.2 x 1.2 x 2.3 cm. They saw 3 lymph nodes on the left side and one discrete LN on the right.\par \par  He also recently just finished a 14 day course of Levofloxacin for an uncomplicated phenomena. He was having a dry cough and underwent imaging as an outpatient which found mild left and right pleural effusions, areas of consolidation on the right and retrocardiac airspace involvement (performed 5/2/22). He now feels better without infectious complaints.\par \par In addition, he is currently anemic. He last had a colonoscopy in his recent memory. He had bleeding hemorrhoids last year treated with OTC medications. He denies any tick bites recently. \par \par He also has MGUS with 3 separate monoclonal gammopathies I suspect is related to his NHL. He has M Judd 1 showing IgG Lambda at 0.3 g/dl, M Judd 2 showing IgG Kappa at 0.2 g/dl, M Judd 3 showing IgM Lambda (unable to quantitate). There is no suspicion for myeloma or waldenstroms at this time and his M-spikes will be monitored. He has no elevation in kappa/ lambda FLC ration, but individual light chains are both elevated, kappa at 10.77 mg/dL and lambda at 6.58 mg/dL.\par \par Social Hx\par - He is currently retired. He used to be an .\par - No significant environmental exposure to chemicals\par - Lives by himself and his wife lives in [place].\par - Former smoker. He smoked for 10 years less than 1 pack a day. He quit 20 years ago\par \par Family hx\par - Two brothers non Hodgkins lymphoma. At least one required chemotherapy. sister had cervical cancer first diagnosed 2007 and then 3 years later with more cancer discovered\par - Mother and father passed away from heart disease and diabetes.\par \par =======================================================\par Care Providers:\[facility] \par PMD: Dr Amari Hickman\[facility] Endo: Dr Carter Vigil\[facility] Cardiologist: Dr. Don [phone] fax [phone]\par \par =======================================================\par \par Vonnie (daughter): [phone] - takes all healthcare related calls [de-identified] : Fine Needle Aspiration Report - Auth (Verified)\par Specimen(s) Submitted\par 1. AXILLA, LEFT, US GUIDED CORE BIOPSY AND FNA\par 2. LYMPH NODE, CERVICAL, LEFT POSTERIOR, US GUIDED CORE BIOPSY AND FNA\par \par \par Final Diagnosis\par 1. AXILLA, LEFT, US GUIDED CORE BIOPSY AND FNA\par POSITIVE FOR MALIGNANT CELLS.\par B-cell lymphoma of follicular center origin, most consistent with\par follicular lymphoma, grade 3A.  See note.\par \par Fine Needle Aspiration Addendum Report - Auth (Verified)\par \par Addendum\par 2. LYMPH NODE, CERVICAL, LEFT POSTERIOR, US GUIDED CORE BIOPSY AND FNA\par POSITIVE FOR MALIGNANT CELLS.\par B-cell lymphoma of follicular center origin with high proliferation index.\par See note.\par \par Note:\par The neoplastic B cells demonstrate a follicular center B-cell phenotype with MUM-1 co-expression and a high proliferation index.  Follicular dendritic meshwork is present in most areas of the biopsy, consistent\par with follicular lymphoma.  However, there is one focal area with increased larger cells and lack of follicular dendritic meshwork. Therefore, a high grade component can not be excluded.  If clinically indicated, suggest excisional biopsy for definitive classification.\par \par Immunohistochemical stains   (CD3, CD5, CD10, CD20, CD21, CD23, CD30, BCL-6, BCL-2, cyclinD1, ki-67, c-MYC, PAX-5, MUM-1, p53, ISAURO) were performed on block 2C.  The neoplastic cells are positive for CD20, PAX-5, BCL-6, BCL-2, MUM-1 (partial), dim p53; negative CD5, CD10, CD30, cyclinD1, ISAURO.  CD21 and CD23 highlight follicular dendritic meshwork in most areas with focal absence of follicular dendritic meshwork. \par Ki-67 proliferation index is approximately 60 to 70%.  C-MYC stains approximately 20 to 30% of cells.  CD3 and CD5 highlight some T-cells in the background. [de-identified] : 6/14/22 FNA of Left axilla LN: FLUORESCENCE IN-SITU HYBRIDIZATION (FISH)\par 1. No evidence of MYC Rearrangement\par 2. No evidence of BCL2-IGH [translocation t(14;18)] gene rearrangement\par 3. 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 [FreeTextEntry1] : Obinutuzumab modified mini-COEP: 400 mg/m² cyclophosphamide, Etoposide (40% dose reduced to 30 mg/m2 IV on day 1 and 60 mg/m2 PO on days 2 and 3 of each cycle), and 2 mg vincristine (flat dose) on day 1 of each cycle, and 100 mg prednisone on days 1–5. Modified from Man et al 2009 Blood "R-CHOP with Etoposide Substituted for Doxorubicin (R-CEOP): Excellent Outcome in Diffuse Large B Cell Lymphoma for Patients with a Contraindication to Anthracyclines." with mini- dosing for elderly patients.  [de-identified] : 5/18/22: Initial Visit.\par \par 6/20/22: Follow-up. Patient feels well overall. Lymphoma labs and left axilla LN biopsy revealed grade 3A follicular lymphoma, IgG Lambda and Kappa MGUS. He reports lymph nodes have been stable. Heart function is stable. He denies having any recent fevers, chills, nausea. No weight changes.\par \par 8/22/22: Follow-up. Patient feels well overall. Awaiting for biopsy results of lymph nodes in his back. He does not have pain in the left back. The left side of his neck gives him discomfort. He has never received chemotherapy nor antibody treatment in the past. No fevers, chills, night sweats. No new noticeable masses  Good appetite, lost 7 lbs (lost a lot of fluid weight due to diuretics). \par \par 10/17/22: Follow up. In the interim, he was hospitalized at [facility] twice (8/27-9/12 & 9/16-10/3). 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 Patient was treated with rasburicase, but developed rasburicase-triggered G6PD hemolysis. Also found to be in acute exacerbation of HFrEF and have a prolonged QTc (likely medication-induced). During hospital stay was found to have altered mental status. 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 In mid-September patient was hospitalized for Encephalopathy (+Rhinovirus/enterovirus) unrelated to the Lymphoma. Today, he feels at baseline. Notes resolution of cervical LNs, and back lesions since starting treatment. Patient denies fever, chills, night sweats, back pain, abdominal pain, chest pain, or shortness of breath. Fair appetite, weight loss due to prolonged hospitalizations.\par \par ___\par A comprehensive review of systems was performed including constitutional, eyes, ENT, cardiovascular, respiratory, gastrointestinal, genitourinary, musculoskeletal, integumentary, neurological, psychiatric and hematologic / lymphatic. All pertinent positives are included in the H&P under interval history above and the remaining review of systems listed are negative. \par \par ==============================\par \par Treatment Hx:\par Obinutuzumab-mini-COEP q21 days x 6 cycles\par Cycle 1 Day 1 given 9/2/22 - third dose of Obinutuzumab held due to AMS\par Cycle 2 Day 1 given 9/30/22\par Cycle 3 day 1 planned for 10/21/22

## 2022-10-20 PROCEDURE — 84393 TAU PHOSPHORYLATED EA: CPT

## 2022-10-20 PROCEDURE — 84550 ASSAY OF BLOOD/URIC ACID: CPT

## 2022-10-20 PROCEDURE — 82607 VITAMIN B-12: CPT

## 2022-10-20 PROCEDURE — 87899 AGENT NOS ASSAY W/OPTIC: CPT

## 2022-10-20 PROCEDURE — 86850 RBC ANTIBODY SCREEN: CPT

## 2022-10-20 PROCEDURE — 85730 THROMBOPLASTIN TIME PARTIAL: CPT

## 2022-10-20 PROCEDURE — 83605 ASSAY OF LACTIC ACID: CPT

## 2022-10-20 PROCEDURE — 84425 ASSAY OF VITAMIN B-1: CPT

## 2022-10-20 PROCEDURE — 86255 FLUORESCENT ANTIBODY SCREEN: CPT

## 2022-10-20 PROCEDURE — 87641 MR-STAPH DNA AMP PROBE: CPT

## 2022-10-20 PROCEDURE — 88108 CYTOPATH CONCENTRATE TECH: CPT

## 2022-10-20 PROCEDURE — 95813 EEG EXTND MNTR 61-119 MIN: CPT

## 2022-10-20 PROCEDURE — 82947 ASSAY GLUCOSE BLOOD QUANT: CPT

## 2022-10-20 PROCEDURE — 81001 URINALYSIS AUTO W/SCOPE: CPT

## 2022-10-20 PROCEDURE — 82945 GLUCOSE OTHER FLUID: CPT

## 2022-10-20 PROCEDURE — 87483 CNS DNA AMP PROBE TYPE 12-25: CPT

## 2022-10-20 PROCEDURE — 97161 PT EVAL LOW COMPLEX 20 MIN: CPT

## 2022-10-20 PROCEDURE — 86341 ISLET CELL ANTIBODY: CPT

## 2022-10-20 PROCEDURE — 82962 GLUCOSE BLOOD TEST: CPT

## 2022-10-20 PROCEDURE — 85027 COMPLETE CBC AUTOMATED: CPT

## 2022-10-20 PROCEDURE — 0225U NFCT DS DNA&RNA 21 SARSCOV2: CPT

## 2022-10-20 PROCEDURE — 83735 ASSAY OF MAGNESIUM: CPT

## 2022-10-20 PROCEDURE — 97116 GAIT TRAINING THERAPY: CPT

## 2022-10-20 PROCEDURE — 71046 X-RAY EXAM CHEST 2 VIEWS: CPT

## 2022-10-20 PROCEDURE — 82164 ANGIOTENSIN I ENZYME TEST: CPT

## 2022-10-20 PROCEDURE — 36415 COLL VENOUS BLD VENIPUNCTURE: CPT

## 2022-10-20 PROCEDURE — C1751: CPT

## 2022-10-20 PROCEDURE — 97110 THERAPEUTIC EXERCISES: CPT

## 2022-10-20 PROCEDURE — A9537: CPT

## 2022-10-20 PROCEDURE — 83519 RIA NONANTIBODY: CPT

## 2022-10-20 PROCEDURE — 84145 PROCALCITONIN (PCT): CPT

## 2022-10-20 PROCEDURE — 87449 NOS EACH ORGANISM AG IA: CPT

## 2022-10-20 PROCEDURE — 84132 ASSAY OF SERUM POTASSIUM: CPT

## 2022-10-20 PROCEDURE — 86403 PARTICLE AGGLUT ANTBDY SCRN: CPT

## 2022-10-20 PROCEDURE — 70450 CT HEAD/BRAIN W/O DYE: CPT | Mod: MA

## 2022-10-20 PROCEDURE — 87116 MYCOBACTERIA CULTURE: CPT

## 2022-10-20 PROCEDURE — 84443 ASSAY THYROID STIM HORMONE: CPT

## 2022-10-20 PROCEDURE — 87205 SMEAR GRAM STAIN: CPT

## 2022-10-20 PROCEDURE — 87070 CULTURE OTHR SPECIMN AEROBIC: CPT

## 2022-10-20 PROCEDURE — 84157 ASSAY OF PROTEIN OTHER: CPT

## 2022-10-20 PROCEDURE — 84166 PROTEIN E-PHORESIS/URINE/CSF: CPT

## 2022-10-20 PROCEDURE — U0005: CPT

## 2022-10-20 PROCEDURE — 86901 BLOOD TYPING SEROLOGIC RH(D): CPT

## 2022-10-20 PROCEDURE — 85014 HEMATOCRIT: CPT

## 2022-10-20 PROCEDURE — 87799 DETECT AGENT NOS DNA QUANT: CPT

## 2022-10-20 PROCEDURE — 76705 ECHO EXAM OF ABDOMEN: CPT

## 2022-10-20 PROCEDURE — 84295 ASSAY OF SERUM SODIUM: CPT

## 2022-10-20 PROCEDURE — 86592 SYPHILIS TEST NON-TREP QUAL: CPT

## 2022-10-20 PROCEDURE — 62328 DX LMBR SPI PNXR W/FLUOR/CT: CPT

## 2022-10-20 PROCEDURE — 85610 PROTHROMBIN TIME: CPT

## 2022-10-20 PROCEDURE — 82746 ASSAY OF FOLIC ACID SERUM: CPT

## 2022-10-20 PROCEDURE — 89051 BODY FLUID CELL COUNT: CPT

## 2022-10-20 PROCEDURE — 86780 TREPONEMA PALLIDUM: CPT

## 2022-10-20 PROCEDURE — 87640 STAPH A DNA AMP PROBE: CPT

## 2022-10-20 PROCEDURE — 84394 TOTAL TAU: CPT

## 2022-10-20 PROCEDURE — 87040 BLOOD CULTURE FOR BACTERIA: CPT

## 2022-10-20 PROCEDURE — 71250 CT THORAX DX C-: CPT

## 2022-10-20 PROCEDURE — 87385 HISTOPLASMA CAPSUL AG IA: CPT

## 2022-10-20 PROCEDURE — 83615 LACTATE (LD) (LDH) ENZYME: CPT

## 2022-10-20 PROCEDURE — 78226 HEPATOBILIARY SYSTEM IMAGING: CPT

## 2022-10-20 PROCEDURE — 87102 FUNGUS ISOLATION CULTURE: CPT

## 2022-10-20 PROCEDURE — 85379 FIBRIN DEGRADATION QUANT: CPT

## 2022-10-20 PROCEDURE — 85018 HEMOGLOBIN: CPT

## 2022-10-20 PROCEDURE — 36569 INSJ PICC 5 YR+ W/O IMAGING: CPT

## 2022-10-20 PROCEDURE — 82330 ASSAY OF CALCIUM: CPT

## 2022-10-20 PROCEDURE — 86900 BLOOD TYPING SEROLOGIC ABO: CPT

## 2022-10-20 PROCEDURE — 94640 AIRWAY INHALATION TREATMENT: CPT

## 2022-10-20 PROCEDURE — 82977 ASSAY OF GGT: CPT

## 2022-10-20 PROCEDURE — 87476 LYME DIS DNA AMP PROBE: CPT

## 2022-10-20 PROCEDURE — 83880 ASSAY OF NATRIURETIC PEPTIDE: CPT

## 2022-10-20 PROCEDURE — 71045 X-RAY EXAM CHEST 1 VIEW: CPT

## 2022-10-20 PROCEDURE — 83873 ASSAY OF CSF PROTEIN: CPT

## 2022-10-20 PROCEDURE — 80053 COMPREHEN METABOLIC PANEL: CPT

## 2022-10-20 PROCEDURE — 82803 BLOOD GASES ANY COMBINATION: CPT

## 2022-10-20 PROCEDURE — 82140 ASSAY OF AMMONIA: CPT

## 2022-10-20 PROCEDURE — 84100 ASSAY OF PHOSPHORUS: CPT

## 2022-10-20 PROCEDURE — 70553 MRI BRAIN STEM W/O & W/DYE: CPT

## 2022-10-20 PROCEDURE — 80074 ACUTE HEPATITIS PANEL: CPT

## 2022-10-20 PROCEDURE — 99285 EMERGENCY DEPT VISIT HI MDM: CPT

## 2022-10-20 PROCEDURE — 82435 ASSAY OF BLOOD CHLORIDE: CPT

## 2022-10-20 PROCEDURE — U0003: CPT

## 2022-10-20 PROCEDURE — 96374 THER/PROPH/DIAG INJ IV PUSH: CPT

## 2022-10-20 PROCEDURE — 86789 WEST NILE VIRUS ANTIBODY: CPT

## 2022-10-20 PROCEDURE — 85025 COMPLETE CBC W/AUTO DIFF WBC: CPT

## 2022-10-20 PROCEDURE — 87529 HSV DNA AMP PROBE: CPT

## 2022-10-20 PROCEDURE — 93005 ELECTROCARDIOGRAM TRACING: CPT

## 2022-10-20 PROCEDURE — A9585: CPT

## 2022-10-20 PROCEDURE — 82232 ASSAY OF BETA-2 PROTEIN: CPT

## 2022-10-20 PROCEDURE — 83010 ASSAY OF HAPTOGLOBIN QUANT: CPT

## 2022-10-20 PROCEDURE — 84484 ASSAY OF TROPONIN QUANT: CPT

## 2022-10-21 ENCOUNTER — RESULT REVIEW (OUTPATIENT)
Age: 72
End: 2022-10-21

## 2022-10-21 ENCOUNTER — NON-APPOINTMENT (OUTPATIENT)
Age: 72
End: 2022-10-21

## 2022-10-21 ENCOUNTER — APPOINTMENT (OUTPATIENT)
Dept: INFUSION THERAPY | Facility: HOSPITAL | Age: 72
End: 2022-10-21

## 2022-10-21 LAB
ALBUMIN SERPL ELPH-MCNC: 4.2 G/DL — SIGNIFICANT CHANGE UP (ref 3.3–5)
ALP SERPL-CCNC: 177 U/L — HIGH (ref 40–120)
ALT FLD-CCNC: 32 U/L — SIGNIFICANT CHANGE UP (ref 10–45)
ANION GAP SERPL CALC-SCNC: 13 MMOL/L — SIGNIFICANT CHANGE UP (ref 5–17)
AST SERPL-CCNC: 32 U/L — SIGNIFICANT CHANGE UP (ref 10–40)
BASOPHILS # BLD AUTO: 0.07 K/UL — SIGNIFICANT CHANGE UP (ref 0–0.2)
BASOPHILS NFR BLD AUTO: 1.4 % — SIGNIFICANT CHANGE UP (ref 0–2)
BILIRUB SERPL-MCNC: 0.5 MG/DL — SIGNIFICANT CHANGE UP (ref 0.2–1.2)
BUN SERPL-MCNC: 19 MG/DL — SIGNIFICANT CHANGE UP (ref 7–23)
CALCIUM SERPL-MCNC: 9.5 MG/DL — SIGNIFICANT CHANGE UP (ref 8.4–10.5)
CHLORIDE SERPL-SCNC: 101 MMOL/L — SIGNIFICANT CHANGE UP (ref 96–108)
CO2 SERPL-SCNC: 26 MMOL/L — SIGNIFICANT CHANGE UP (ref 22–31)
CREAT SERPL-MCNC: 1.03 MG/DL — SIGNIFICANT CHANGE UP (ref 0.5–1.3)
EGFR: 78 ML/MIN/1.73M2 — SIGNIFICANT CHANGE UP
EOSINOPHIL # BLD AUTO: 0.25 K/UL — SIGNIFICANT CHANGE UP (ref 0–0.5)
EOSINOPHIL NFR BLD AUTO: 4.9 % — SIGNIFICANT CHANGE UP (ref 0–6)
GLUCOSE SERPL-MCNC: 153 MG/DL — HIGH (ref 70–99)
HCT VFR BLD CALC: 34.5 % — LOW (ref 39–50)
HGB BLD-MCNC: 11 G/DL — LOW (ref 13–17)
IMM GRANULOCYTES NFR BLD AUTO: 0.8 % — SIGNIFICANT CHANGE UP (ref 0–0.9)
LDH SERPL L TO P-CCNC: 347 U/L — HIGH (ref 50–242)
LYMPHOCYTES # BLD AUTO: 0.35 K/UL — LOW (ref 1–3.3)
LYMPHOCYTES # BLD AUTO: 6.8 % — LOW (ref 13–44)
MCHC RBC-ENTMCNC: 27.7 PG — SIGNIFICANT CHANGE UP (ref 27–34)
MCHC RBC-ENTMCNC: 31.9 G/DL — LOW (ref 32–36)
MCV RBC AUTO: 86.9 FL — SIGNIFICANT CHANGE UP (ref 80–100)
MONOCYTES # BLD AUTO: 0.65 K/UL — SIGNIFICANT CHANGE UP (ref 0–0.9)
MONOCYTES NFR BLD AUTO: 12.7 % — SIGNIFICANT CHANGE UP (ref 2–14)
NEUTROPHILS # BLD AUTO: 3.77 K/UL — SIGNIFICANT CHANGE UP (ref 1.8–7.4)
NEUTROPHILS NFR BLD AUTO: 73.4 % — SIGNIFICANT CHANGE UP (ref 43–77)
NRBC # BLD: 0 /100 WBCS — SIGNIFICANT CHANGE UP (ref 0–0)
PLATELET # BLD AUTO: 117 K/UL — LOW (ref 150–400)
POTASSIUM SERPL-MCNC: 4.9 MMOL/L — SIGNIFICANT CHANGE UP (ref 3.5–5.3)
POTASSIUM SERPL-SCNC: 4.9 MMOL/L — SIGNIFICANT CHANGE UP (ref 3.5–5.3)
PROT SERPL-MCNC: 7.1 G/DL — SIGNIFICANT CHANGE UP (ref 6–8.3)
RBC # BLD: 3.97 M/UL — LOW (ref 4.2–5.8)
RBC # FLD: 19 % — HIGH (ref 10.3–14.5)
SODIUM SERPL-SCNC: 139 MMOL/L — SIGNIFICANT CHANGE UP (ref 135–145)
URATE SERPL-MCNC: 5.3 MG/DL — SIGNIFICANT CHANGE UP (ref 3.4–8.8)
WBC # BLD: 5.13 K/UL — SIGNIFICANT CHANGE UP (ref 3.8–10.5)
WBC # FLD AUTO: 5.13 K/UL — SIGNIFICANT CHANGE UP (ref 3.8–10.5)

## 2022-10-21 NOTE — PATIENT PROFILE ADULT - NSPRESCRUSEDDRG_GEN_A_NUR
"  Kenny Gao Patient Age: 62year old  MESSAGE:   Patient states to please call Diana Haines she needs cpap supplies & that once we have called she would like a call because she wants to follow up      WEIGHT AND HEIGHT:   Wt Readings from Last 1 Encounters:   09/19/22 131.5 kg (290 lb)     Ht Readings from Last 1 Encounters:   10/12/22 5' 10"" (1.778 m)     BMI Readings from Last 1 Encounters:   10/12/22 41.61 kg/mÂ²       ALLERGIES:  Alc-sertraline  Current Outpatient Medications   Medication Sig Dispense Refill   â¢ fluoxetine (PROzac) 40 MG capsule Take 1 capsule by mouth daily. 90 capsule 3   â¢ meloxicam (MOBIC) 15 MG tablet Take 1 tablet by mouth daily. 30 tablet 1   â¢ levothyroxine (Euthyrox) 150 MCG tablet Take 1 tablet by mouth daily. 90 tablet 3   â¢ pantoprazole (PROTONIX) 40 MG tablet Take 1 tablet by mouth daily. 90 tablet 3   â¢ oxybutynin (DITROPAN XL) 15 MG 24 hr tablet Take 1 tablet by mouth daily. 90 tablet 3   â¢ clobetasol emollient base (Clobetasol Propionate E) 0.05 % cream APPLY TO AFFECTED AREAS ON THE BODY TWICE DAILY FOR 1-2 WEEKS, AVOIDEYES, FACE, BODY FOLDS 60 g 0   â¢ clobetasol (OLUX) 0.05 % foam Apply twice daily strictly to scalp for 2 weeks 100 g 2   â¢ ketoconazole (NIZORAL) 2 % shampoo Use three times weekly; each application to remain on the scalp for 5 minutes prior to being washed off 360 mL 3   â¢ Ascorbic Acid (vitamin C) 1000 MG tablet Take 1,000 mg by mouth daily. â¢ tavaborole (Kerydin) 5 % external solution Apply 1 application topically daily. 1 Bottle 3   â¢ efinaconazole (Jublia) 10 % topical solution Apply to aa and surrounding skin daily. Remove residual once weekly using acetone. Repeat until resolved. 4 mL 3   â¢ albuterol 108 (90 Base) MCG/ACT inhaler Inhale 2 puffs into the lungs every 4 hours as needed for Shortness of Breath or Wheezing.  1 Inhaler 3   â¢ Calcium Carbonate-Vitamin D 600-200 MG-UNIT Cap Take  by mouth qd     â¢ Magnesium Sulfate 70 MG Cap Take  by mouth as " needed. â¢ nystatin-triamcinolone (MYCOLOG II) 381432-3.1 UNIT/GM-% cream apply topically bid prn       No current facility-administered medications for this visit. PHARMACY to use: [address] preference(s) on file:   [place], [address] [facility] [address] [facility] [address]  Phone: [phone] Fax: [phone]    [address] [id number] [facility] [address] [facility]  [address] [facility]  [address] [facility]  Phone: [phone] Fax: [facility]. [address] [facility] [place] [address]  Phone: [phone] Fax: [phone]    [facility] [address] [place], [place] [address] [address]  Phone: [phone] Fax: [phone]    Lisette Jj [id number] - Tanika Patterson, [address]  [address] [facility]  Phone: [phone] Fax: [address] [facility]  [address] [facility] [address]  Phone: [phone] Fax: [address] Natalie Vang  [address] [facility] [address]  Phone: [phone] Fax: [address] [facility], [address] [facility] [address]  [address] [place] [address]  Phone: [phone] Fax: [phone]: Shania De La Rosa to leave response (including medical information) with family member or on answering machine  ROUTING: Patient's physician/staff        PCP: Clarissa Cox MD         INS: Payor: ROLAND/CLAUDIO / Plan: [employer] [address] ([place]) / Product Type: PPO MISC   PATIENT ADDRESS:  [address] [place] [id number] No

## 2022-10-24 ENCOUNTER — APPOINTMENT (OUTPATIENT)
Dept: INFUSION THERAPY | Facility: HOSPITAL | Age: 72
End: 2022-10-24

## 2022-10-24 ENCOUNTER — NON-APPOINTMENT (OUTPATIENT)
Age: 72
End: 2022-10-24

## 2022-10-24 DIAGNOSIS — C85.10 UNSPECIFIED B-CELL LYMPHOMA, UNSPECIFIED SITE: ICD-10-CM

## 2022-10-24 DIAGNOSIS — Z51.11 ENCOUNTER FOR ANTINEOPLASTIC CHEMOTHERAPY: ICD-10-CM

## 2022-10-24 DIAGNOSIS — R11.2 NAUSEA WITH VOMITING, UNSPECIFIED: ICD-10-CM

## 2022-10-25 DIAGNOSIS — Z51.89 ENCOUNTER FOR OTHER SPECIFIED AFTERCARE: ICD-10-CM

## 2022-10-25 LAB
ALBUMIN SERPL ELPH-MCNC: 4.7 G/DL
ALP BLD-CCNC: 186 U/L
ALT SERPL-CCNC: 45 U/L
ANION GAP SERPL CALC-SCNC: 12 MMOL/L
APTT BLD: 35.5 SEC
AST SERPL-CCNC: 28 U/L
BILIRUB SERPL-MCNC: 0.6 MG/DL
BUN SERPL-MCNC: 18 MG/DL
CALCIUM SERPL-MCNC: 9.6 MG/DL
CHLORIDE SERPL-SCNC: 104 MMOL/L
CO2 SERPL-SCNC: 28 MMOL/L
CREAT SERPL-MCNC: 1.04 MG/DL
EGFR: 77 ML/MIN/1.73M2
GLUCOSE SERPL-MCNC: 88 MG/DL
INR PPP: 1.19 RATIO
LDH SERPL-CCNC: 217 U/L
POTASSIUM SERPL-SCNC: 4.8 MMOL/L
PROT SERPL-MCNC: 7.8 G/DL
PT BLD: 14 SEC
SARS-COV-2 N GENE NPH QL NAA+PROBE: NOT DETECTED
SODIUM SERPL-SCNC: 144 MMOL/L
URATE SERPL-MCNC: 5.4 MG/DL

## 2022-10-26 LAB
CULTURE RESULTS: SIGNIFICANT CHANGE UP
SPECIMEN SOURCE: SIGNIFICANT CHANGE UP

## 2022-10-28 ENCOUNTER — INPATIENT (INPATIENT)
Facility: HOSPITAL | Age: 72
LOS: 10 days | Discharge: NURSING, REHAB OR LONG-TERM CARE | DRG: 871 | End: 2022-11-08
Attending: STUDENT IN AN ORGANIZED HEALTH CARE EDUCATION/TRAINING PROGRAM | Admitting: STUDENT IN AN ORGANIZED HEALTH CARE EDUCATION/TRAINING PROGRAM
Payer: COMMERCIAL

## 2022-10-28 VITALS
DIASTOLIC BLOOD PRESSURE: 75 MMHG | HEIGHT: 72.25 IN | TEMPERATURE: 103 F | WEIGHT: 145.95 LBS | RESPIRATION RATE: 18 BRPM | HEART RATE: 77 BPM | OXYGEN SATURATION: 97 % | SYSTOLIC BLOOD PRESSURE: 162 MMHG

## 2022-10-28 DIAGNOSIS — C85.90 NON-HODGKIN LYMPHOMA, UNSPECIFIED, UNSPECIFIED SITE: Chronic | ICD-10-CM

## 2022-10-28 DIAGNOSIS — Z95.0 PRESENCE OF CARDIAC PACEMAKER: Chronic | ICD-10-CM

## 2022-10-28 LAB
ALBUMIN SERPL ELPH-MCNC: 3.5 G/DL — SIGNIFICANT CHANGE UP (ref 3.3–5)
ALP SERPL-CCNC: 178 U/L — HIGH (ref 40–120)
ALT FLD-CCNC: 35 U/L — SIGNIFICANT CHANGE UP (ref 10–45)
ANION GAP SERPL CALC-SCNC: 16 MMOL/L — SIGNIFICANT CHANGE UP (ref 5–17)
ANISOCYTOSIS BLD QL: SIGNIFICANT CHANGE UP
APTT BLD: 34.9 SEC — SIGNIFICANT CHANGE UP (ref 27.5–35.5)
AST SERPL-CCNC: 33 U/L — SIGNIFICANT CHANGE UP (ref 10–40)
BASE EXCESS BLDV CALC-SCNC: 5.1 MMOL/L — HIGH (ref -2–3)
BASOPHILS # BLD AUTO: 0 K/UL — SIGNIFICANT CHANGE UP (ref 0–0.2)
BASOPHILS NFR BLD AUTO: 0 % — SIGNIFICANT CHANGE UP (ref 0–2)
BILIRUB SERPL-MCNC: 1.6 MG/DL — HIGH (ref 0.2–1.2)
BUN SERPL-MCNC: 32 MG/DL — HIGH (ref 7–23)
CA-I SERPL-SCNC: 1.19 MMOL/L — SIGNIFICANT CHANGE UP (ref 1.15–1.33)
CALCIUM SERPL-MCNC: 9 MG/DL — SIGNIFICANT CHANGE UP (ref 8.4–10.5)
CHLORIDE BLDV-SCNC: 103 MMOL/L — SIGNIFICANT CHANGE UP (ref 96–108)
CHLORIDE SERPL-SCNC: 99 MMOL/L — SIGNIFICANT CHANGE UP (ref 96–108)
CK SERPL-CCNC: 590 U/L — HIGH (ref 30–200)
CO2 BLDV-SCNC: 30 MMOL/L — HIGH (ref 22–26)
CO2 SERPL-SCNC: 25 MMOL/L — SIGNIFICANT CHANGE UP (ref 22–31)
CREAT SERPL-MCNC: 1.49 MG/DL — HIGH (ref 0.5–1.3)
DACRYOCYTES BLD QL SMEAR: SLIGHT — SIGNIFICANT CHANGE UP
EGFR: 50 ML/MIN/1.73M2 — LOW
ELLIPTOCYTES BLD QL SMEAR: SLIGHT — SIGNIFICANT CHANGE UP
EOSINOPHIL # BLD AUTO: 0 K/UL — SIGNIFICANT CHANGE UP (ref 0–0.5)
EOSINOPHIL NFR BLD AUTO: 0 % — SIGNIFICANT CHANGE UP (ref 0–6)
GAS PNL BLDV: 137 MMOL/L — SIGNIFICANT CHANGE UP (ref 136–145)
GAS PNL BLDV: SIGNIFICANT CHANGE UP
GAS PNL BLDV: SIGNIFICANT CHANGE UP
GLUCOSE BLDV-MCNC: 145 MG/DL — HIGH (ref 70–99)
GLUCOSE SERPL-MCNC: 142 MG/DL — HIGH (ref 70–99)
HCO3 BLDV-SCNC: 29 MMOL/L — SIGNIFICANT CHANGE UP (ref 22–29)
HCT VFR BLD CALC: 30.3 % — LOW (ref 39–50)
HCT VFR BLDA CALC: 27 % — LOW (ref 39–51)
HGB BLD CALC-MCNC: 9.1 G/DL — LOW (ref 12.6–17.4)
HGB BLD-MCNC: 9.5 G/DL — LOW (ref 13–17)
HYPOCHROMIA BLD QL: SLIGHT — SIGNIFICANT CHANGE UP
INR BLD: 2.11 RATIO — HIGH (ref 0.88–1.16)
LACTATE BLDV-MCNC: 1.6 MMOL/L — SIGNIFICANT CHANGE UP (ref 0.5–2)
LDH SERPL L TO P-CCNC: 338 U/L — HIGH (ref 50–242)
LYMPHOCYTES # BLD AUTO: 0.11 K/UL — LOW (ref 1–3.3)
LYMPHOCYTES # BLD AUTO: 0.8 % — LOW (ref 13–44)
MACROCYTES BLD QL: SLIGHT — SIGNIFICANT CHANGE UP
MANUAL SMEAR VERIFICATION: SIGNIFICANT CHANGE UP
MCHC RBC-ENTMCNC: 27.3 PG — SIGNIFICANT CHANGE UP (ref 27–34)
MCHC RBC-ENTMCNC: 31.4 GM/DL — LOW (ref 32–36)
MCV RBC AUTO: 87.1 FL — SIGNIFICANT CHANGE UP (ref 80–100)
METAMYELOCYTES # FLD: 0.9 % — HIGH (ref 0–0)
MICROCYTES BLD QL: SLIGHT — SIGNIFICANT CHANGE UP
MONOCYTES # BLD AUTO: 0.48 K/UL — SIGNIFICANT CHANGE UP (ref 0–0.9)
MONOCYTES NFR BLD AUTO: 3.4 % — SIGNIFICANT CHANGE UP (ref 2–14)
NEUTROPHILS # BLD AUTO: 13.29 K/UL — HIGH (ref 1.8–7.4)
NEUTROPHILS NFR BLD AUTO: 93.2 % — HIGH (ref 43–77)
NEUTS BAND # BLD: 1.7 % — SIGNIFICANT CHANGE UP (ref 0–8)
OVALOCYTES BLD QL SMEAR: SLIGHT — SIGNIFICANT CHANGE UP
PCO2 BLDV: 39 MMHG — LOW (ref 42–55)
PH BLDV: 7.48 — HIGH (ref 7.32–7.43)
PLAT MORPH BLD: NORMAL — SIGNIFICANT CHANGE UP
PLATELET # BLD AUTO: 143 K/UL — LOW (ref 150–400)
PO2 BLDV: 38 MMHG — SIGNIFICANT CHANGE UP (ref 25–45)
POLYCHROMASIA BLD QL SMEAR: SLIGHT — SIGNIFICANT CHANGE UP
POTASSIUM BLDV-SCNC: 3.2 MMOL/L — LOW (ref 3.5–5.1)
POTASSIUM SERPL-MCNC: 3.5 MMOL/L — SIGNIFICANT CHANGE UP (ref 3.5–5.3)
POTASSIUM SERPL-SCNC: 3.5 MMOL/L — SIGNIFICANT CHANGE UP (ref 3.5–5.3)
PROT SERPL-MCNC: 6.8 G/DL — SIGNIFICANT CHANGE UP (ref 6–8.3)
PROTHROM AB SERPL-ACNC: 24.4 SEC — HIGH (ref 10.5–13.4)
RBC # BLD: 3.48 M/UL — LOW (ref 4.2–5.8)
RBC # FLD: 19.9 % — HIGH (ref 10.3–14.5)
RBC BLD AUTO: ABNORMAL
SAO2 % BLDV: 66.9 % — LOW (ref 67–88)
SODIUM SERPL-SCNC: 140 MMOL/L — SIGNIFICANT CHANGE UP (ref 135–145)
TROPONIN T, HIGH SENSITIVITY RESULT: 82 NG/L — HIGH (ref 0–51)
URATE SERPL-MCNC: 8.1 MG/DL — SIGNIFICANT CHANGE UP (ref 3.4–8.8)
WBC # BLD: 14 K/UL — HIGH (ref 3.8–10.5)
WBC # FLD AUTO: 14 K/UL — HIGH (ref 3.8–10.5)

## 2022-10-28 PROCEDURE — 99285 EMERGENCY DEPT VISIT HI MDM: CPT

## 2022-10-28 PROCEDURE — 70450 CT HEAD/BRAIN W/O DYE: CPT | Mod: 26,MA

## 2022-10-28 PROCEDURE — 72125 CT NECK SPINE W/O DYE: CPT | Mod: 26,MA

## 2022-10-28 PROCEDURE — 71045 X-RAY EXAM CHEST 1 VIEW: CPT | Mod: 26

## 2022-10-28 PROCEDURE — 71275 CT ANGIOGRAPHY CHEST: CPT | Mod: 26,MA

## 2022-10-28 PROCEDURE — 74177 CT ABD & PELVIS W/CONTRAST: CPT | Mod: 26,MA

## 2022-10-28 RX ORDER — PIPERACILLIN AND TAZOBACTAM 4; .5 G/20ML; G/20ML
3.38 INJECTION, POWDER, LYOPHILIZED, FOR SOLUTION INTRAVENOUS ONCE
Refills: 0 | Status: COMPLETED | OUTPATIENT
Start: 2022-10-29 | End: 2022-10-29

## 2022-10-28 RX ORDER — ACETAMINOPHEN 500 MG
1000 TABLET ORAL ONCE
Refills: 0 | Status: COMPLETED | OUTPATIENT
Start: 2022-10-28 | End: 2022-10-28

## 2022-10-28 RX ORDER — PIPERACILLIN AND TAZOBACTAM 4; .5 G/20ML; G/20ML
3.38 INJECTION, POWDER, LYOPHILIZED, FOR SOLUTION INTRAVENOUS ONCE
Refills: 0 | Status: COMPLETED | OUTPATIENT
Start: 2022-10-28 | End: 2022-10-28

## 2022-10-28 RX ORDER — VANCOMYCIN HCL 1 G
1000 VIAL (EA) INTRAVENOUS EVERY 12 HOURS
Refills: 0 | Status: COMPLETED | OUTPATIENT
Start: 2022-10-29 | End: 2022-10-29

## 2022-10-28 RX ORDER — VANCOMYCIN HCL 1 G
VIAL (EA) INTRAVENOUS
Refills: 0 | Status: COMPLETED | OUTPATIENT
Start: 2022-10-28 | End: 2022-10-29

## 2022-10-28 RX ORDER — VANCOMYCIN HCL 1 G
1000 VIAL (EA) INTRAVENOUS ONCE
Refills: 0 | Status: COMPLETED | OUTPATIENT
Start: 2022-10-28 | End: 2022-10-28

## 2022-10-28 RX ADMIN — Medication 1000 MILLIGRAM(S): at 23:59

## 2022-10-28 RX ADMIN — Medication 400 MILLIGRAM(S): at 22:37

## 2022-10-28 RX ADMIN — Medication 250 MILLIGRAM(S): at 23:02

## 2022-10-28 RX ADMIN — PIPERACILLIN AND TAZOBACTAM 200 GRAM(S): 4; .5 INJECTION, POWDER, LYOPHILIZED, FOR SOLUTION INTRAVENOUS at 22:22

## 2022-10-28 NOTE — ED PROVIDER NOTE - OBJECTIVE STATEMENT
72y M w/ pmhx of afib (on eliquis), HTN, complete heart block s/p PPM, HLD, HFrEF (30% in 09/2022), and DLBCL (tx with R-CHOP in 2003, with relapse in 2009 treated with BR) now with recently diagnosed grade 3 follicular lymphoma who was transferred from [facility] for acute on chronic encephalopathy likely 2/2 rhino/entero viral pneumonia i/s/o follicular lymphoma and ongoing chemotherapy treatment. Pt brought in by EMS presents to the ED after being found on the floor for an unknown time. Of note pt had fever at time of arrival. Denies HA, visual changes, cp, back pain, difficulty breathing, rash. As per daughter pt was normal up until 12pm today, pt is not acting at his baseline. States she went to go see him today around 6:30pm and he wasn't answering the door, this is when she found him on the floor in the dining room. During this time the pt had an episode of urinary incontinence which is not normal for him. Pt remembers falling. Endorsed a fever of 99.6 two days ago and relieved with Tylenol. Pt lives alone. Pt was previously in the hospital in august for ams after thrid round of chemo.  Denies smoking cigarettes. Denies hx of seizure. 72y M w/ pmhx of afib (on eliquis), HTN, complete heart block s/p PPM, HLD, HFrEF (30% in 09/2022), and DLBCL (tx with R-CHOP in 2003, with relapse in 2009 treated with BR) now with recently diagnosed grade 3 follicular lymphoma who was transferred from [facility] for acute on chronic encephalopathy likely 2/2 rhino/entero viral pneumonia i/s/o follicular lymphoma and ongoing chemotherapy treatment. As per daughter pt was normal up until 12pm today, pt is not acting at his baseline. States she went to go see him today around 6:30pm and he wasn't answering the door, this is when she found him on the floor in the dining room. During this time the pt had an episode of urinary incontinence which is not normal for him. Pt remembers falling. Endorsed a fever of 99.6 two days ago and relieved with Tylenol. Pt lives alone. Pt brought in by EMS A&Ox2 presents to the ED after being found on the floor for an unknown time. Denies HA, visual changes, cp, back pain, difficulty breathing, abdominal pain, diarrhea, dysuria, peripheral edema, c spine or thoracic spinal tenderness, lumbar spinal tenderness, change in sensation to b/l le, rash. recent hospital admission for acute on chronic encephalopathy last month got LP-insufficient cells to report findings, CT head, EEG which were all negative. 72y M w/ pmhx of afib (on eliquis), HTN, complete heart block s/p PPM, HLD, HFrEF (30% in 09/2022), and DLBCL (tx with R-CHOP in 2003, with relapse in 2009 treated with BR) now with recently diagnosed grade 3 follicular lymphoma who was transferred from [facility] for acute on chronic encephalopathy likely 2/2 rhino/entero viral pneumonia i/s/o follicular lymphoma and ongoing chemotherapy treatment. As per daughter pt was normal up until 12pm today, pt is not acting at his baseline. States she went to go see him today around 6:30pm and he wasn't answering the door, this is when she found him on the floor in the dining room. During this time the pt had an episode of urinary incontinence which is not normal for him. Pt remembers falling. Endorsed a fever of 99.6 two days ago and relieved with Tylenol. Pt lives alone. Pt brought in by EMS A&Ox2 presents to the ED after being found on the floor for an unknown time. Denies HA, visual changes, cp, back pain, difficulty breathing, abdominal pain, diarrhea, dysuria, peripheral edema, c spine or thoracic spinal tenderness, lumbar spinal tenderness, change in sensation to b/l le, rash. recent hospital admission for acute on chronic encephalopathy last month got LP-insufficient cells to report findings, CT head, EEG which were all negative.      Attn - pt seen in 40 - agree with above - pt not able to provide reliable hx.  hs from daughter - pt lives alone.  she last saw him yesterday and he was in [facility].  pt had PN in April.  under care for large B cell Lymphoma currently at [facility].  pt denies complaint on ROS.  Daugther found pt on floor in dining room with incontinence.

## 2022-10-28 NOTE — ED ADULT NURSE NOTE - NSIMPLEMENTINTERV_GEN_ALL_ED
Implemented All Fall with Harm Risk Interventions:  [place] to call system. Call bell, personal items and telephone within reach. Instruct patient to call for assistance. Room bathroom lighting operational. Non-slip footwear when patient is off stretcher. Physically safe environment: no spills, clutter or unnecessary equipment. Stretcher in lowest position, wheels locked, appropriate side rails in place. Provide visual cue, wrist band, yellow gown, etc. Monitor gait and stability. Monitor for mental status changes and reorient to person, place, and time. Review medications for side effects contributing to fall risk. Reinforce activity limits and safety measures with patient and family. Provide visual clues: red socks.

## 2022-10-28 NOTE — ED PROVIDER NOTE - NSICDXPASTMEDICALHX_GEN_ALL_CORE_FT
PAST MEDICAL HISTORY:  Atrial flutter, unspecified type     DM type 2 (diabetes mellitus, type 2) Never on insulin    Essential hypertension     Impaired memory     Moderate mitral regurgitation     Non-Hodgkins Lymphoma In [facility] 10 [facility] now with relapse    Pleural effusion     Rhinitis, allergic     Systolic heart failure

## 2022-10-28 NOTE — ED ADULT NURSE REASSESSMENT NOTE - NS ED NURSE REASSESS COMMENT FT1
WAR room called, pt had 5 beats of wide complex vtach. Strip printed and given to MD Cruz, pt denies chest pain, dizziness & SOB

## 2022-10-28 NOTE — ED PROVIDER NOTE - NS ED ROS FT
General: +fever tm 102, chills  Eyes: denies visual changes, blurred vision  CV: denies chest pain, palpitations  Resp: denies difficulty breathing, cough  Abdominal: denies nausea, vomiting, diarrhea, abdominal pain  : +urinary incontinence  MSK: denies muscle aches, leg swelling  Neuro: denies headaches, numbness, tingling  Skin: denies rashes, bruises

## 2022-10-28 NOTE — ED ADULT NURSE NOTE - NSICDXPASTMEDICALHX_GEN_ALL_CORE_FT
PAST MEDICAL HISTORY:  Atrial flutter, unspecified type     DM type 2 (diabetes mellitus, type 2) Never on insulin    Essential hypertension     Impaired memory     Moderate mitral regurgitation     Non-Hodgkins Lymphoma In [facility] for > 10 years now with relapse    Pleural effusion     Rhinitis, allergic     Systolic heart failure

## 2022-10-28 NOTE — ED PROVIDER NOTE - PHYSICAL EXAMINATION
A&Ox2  CARDIAC: regular rate and paced rhythm, no appreciable murmurs  PULM: normal breath sounds, clear to ascultation bilaterally, no rales, rhonchi, wheezing  GI: abdomen nondistended, soft, nontender, no guarding, rebound tenderness  :  no suprapubic tenderness  NEURO: no focal motor or sensory deficits, CN2-12 intact, normal speech, PERRLA, EOMI, normal gait, AAOx3  MSK: no cervical spinal tenderness, no thoracic spinal tenderness, no lumbar spinal tenderness, no chest wall tenderness or rib tenderness, full ROM UE and LE, no troch tenderness b/l, compartments soft and nontender lower extremity, motor and sensory intact b/l distal pulses palpated LE.   SKIN: no abrasions. no abrasion or laceration on tongue. A&Ox2  CARDIAC: regular rate and paced rhythm, no appreciable murmurs  PULM: normal breath sounds, clear to ascultation bilaterally, no rales, rhonchi, wheezing  GI: abdomen nondistended, soft, nontender, no guarding, rebound tenderness  :  no suprapubic tenderness  NEURO: no focal motor or sensory deficits, CN2-12 intact, normal speech, PERRLA, EOMI, normal gait, AAOx3  MSK: no cervical spinal tenderness, no thoracic spinal tenderness, no lumbar spinal tenderness, no chest wall tenderness or rib tenderness, full ROM UE and LE, no troch tenderness b/l, compartments soft and nontender lower extremity, motor and sensory intact b/l distal pulses palpated LE.   SKIN: no abrasions. no abrasion or laceration on tongue.       Attending note.  Patient is alert and in no acute distress.  Head is normocephalic and atraumatic.  Pupils are 2 mm equal and reactive.  Patient has dry mucous membranes.  Cervical collar was removed for cervical examination.  Patient has no midline tenderness.  He is able to actively range his neck without pain.  Cervical collar was replaced.  Lungs are clear and equal bilaterally.  There are no wheezes, rales or rhonchi.  Heart is regular rate and rhythm without murmur.  Abdomen is soft, flat and nontender and nondistended.  There is no extremity edema.  Neurologic examination is intact and nonfocal.

## 2022-10-28 NOTE — ED PROVIDER NOTE - CLINICAL SUMMARY MEDICAL DECISION MAKING FREE TEXT BOX
71 yo m pmhx a fib on eliuquis, htn, heart block w PPM, HFrEF 30%in 9/22, DLBCL relapse 2009, grade 3 follicular lymphoma on chemo last chemo recently seen by dr. gaines, and recent hospital admission for acute on chronic encephalopathy believed to be secondary to viral pneumonia presents to ed with AMS and unwitnessed fall, brought in by EMS, found by daughter around 6 pm had to break into apt, says her dad was acting normally yesterday, afebrile. today on arrival oral temp 101, and hypoxic 93% on RA, 97 on 2L NC. on exam pt says he remembers fall but could not answer any further questions, daughter says this is not his baseline. lungs clear b/l rll coarse breath sounds but no wheezing or rhonchi. trauma exam no pertinent positive findings no c spine or t spine or l spine tenderness, normal LE neuro exam, full ROM, sensory and motor intact-less of a concern for cauda equina. full sepsis workup and trauma eval, and tumor lysis syndrome eval w ldh and uric acid. will get ultrasound of ivc to see if pt can get fluids bc of hx of hfref pt not clincially overloaded-sally hold off on fluids until then. will get ct head neck chest abd pelvis to r/o sepsis source and oncologic mets. admit to medicine. 73 yo m pmhx a fib on eliuquis, htn, heart block w PPM, HFrEF 30%in 9/22, DLBCL relapse 2009, grade 3 follicular lymphoma on chemo last chemo recently seen by dr. gaines, and recent hospital admission for acute on chronic encephalopathy believed to be secondary to viral pneumonia presents to ed with AMS and unwitnessed fall, brought in by EMS, found by daughter around 6 pm had to break into apt, says her dad was acting normally yesterday, afebrile. today on arrival oral temp 101, and hypoxic 93% on RA, 97 on 2L NC. on exam pt says he remembers fall but could not answer any further questions, daughter says this is not his baseline. lungs clear b/l rll coarse breath sounds but no wheezing or rhonchi. trauma exam no pertinent positive findings no c spine or t spine or l spine tenderness, normal LE neuro exam, full ROM, sensory and motor intact-less of a concern for cauda equina. full sepsis workup and trauma eval, and tumor lysis syndrome eval w ldh and uric acid. will get ultrasound of ivc to see if pt can get fluids bc of hx of hfref pt not clincially overloaded-sally hold off on fluids until then. will get ct head neck chest abd pelvis to r/o sepsis source and oncologic mets. admit to medicine.     Attending notes.  Patient found on floor with confusion.  Fever, hypoxia.  Rule out pneumonia, COVID, PE.  CT angio of chest, CT head and neck.  Chest x-ray, urinalysis, blood cultures, fever control, IV antibiotics.  Admission.
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
## 2022-10-29 DIAGNOSIS — J18.9 PNEUMONIA, UNSPECIFIED ORGANISM: ICD-10-CM

## 2022-10-29 DIAGNOSIS — R55 SYNCOPE AND COLLAPSE: ICD-10-CM

## 2022-10-29 DIAGNOSIS — Z79.899 OTHER LONG TERM (CURRENT) DRUG THERAPY: ICD-10-CM

## 2022-10-29 DIAGNOSIS — N18.30 CHRONIC KIDNEY DISEASE, STAGE 3 UNSPECIFIED: ICD-10-CM

## 2022-10-29 DIAGNOSIS — I48.92 UNSPECIFIED ATRIAL FLUTTER: ICD-10-CM

## 2022-10-29 DIAGNOSIS — C85.90 NON-HODGKIN LYMPHOMA, UNSPECIFIED, UNSPECIFIED SITE: ICD-10-CM

## 2022-10-29 DIAGNOSIS — G92.8 OTHER TOXIC ENCEPHALOPATHY: ICD-10-CM

## 2022-10-29 DIAGNOSIS — Z29.9 ENCOUNTER FOR PROPHYLACTIC MEASURES, UNSPECIFIED: ICD-10-CM

## 2022-10-29 DIAGNOSIS — D64.9 ANEMIA, UNSPECIFIED: ICD-10-CM

## 2022-10-29 DIAGNOSIS — N17.9 ACUTE KIDNEY FAILURE, UNSPECIFIED: ICD-10-CM

## 2022-10-29 DIAGNOSIS — I50.20 UNSPECIFIED SYSTOLIC (CONGESTIVE) HEART FAILURE: ICD-10-CM

## 2022-10-29 DIAGNOSIS — E11.9 TYPE 2 DIABETES MELLITUS WITHOUT COMPLICATIONS: ICD-10-CM

## 2022-10-29 DIAGNOSIS — A41.9 SEPSIS, UNSPECIFIED ORGANISM: ICD-10-CM

## 2022-10-29 LAB
A1C WITH ESTIMATED AVERAGE GLUCOSE RESULT: 6.2 % — HIGH (ref 4–5.6)
ALBUMIN SERPL ELPH-MCNC: 4.1 G/DL — SIGNIFICANT CHANGE UP (ref 3.3–5)
ALP SERPL-CCNC: 222 U/L — HIGH (ref 40–120)
ALT FLD-CCNC: 43 U/L — SIGNIFICANT CHANGE UP (ref 10–45)
ANION GAP SERPL CALC-SCNC: 17 MMOL/L — SIGNIFICANT CHANGE UP (ref 5–17)
APPEARANCE UR: CLEAR — SIGNIFICANT CHANGE UP
APTT BLD: 33.9 SEC — SIGNIFICANT CHANGE UP (ref 27.5–35.5)
AST SERPL-CCNC: 52 U/L — HIGH (ref 10–40)
BASOPHILS # BLD AUTO: 0.11 K/UL — SIGNIFICANT CHANGE UP (ref 0–0.2)
BASOPHILS NFR BLD AUTO: 0.9 % — SIGNIFICANT CHANGE UP (ref 0–2)
BILIRUB SERPL-MCNC: 2 MG/DL — HIGH (ref 0.2–1.2)
BILIRUB UR-MCNC: NEGATIVE — SIGNIFICANT CHANGE UP
BUN SERPL-MCNC: 31 MG/DL — HIGH (ref 7–23)
CALCIUM SERPL-MCNC: 9.9 MG/DL — SIGNIFICANT CHANGE UP (ref 8.4–10.5)
CHLORIDE SERPL-SCNC: 99 MMOL/L — SIGNIFICANT CHANGE UP (ref 96–108)
CO2 SERPL-SCNC: 25 MMOL/L — SIGNIFICANT CHANGE UP (ref 22–31)
COLOR SPEC: YELLOW — SIGNIFICANT CHANGE UP
CREAT SERPL-MCNC: 1.42 MG/DL — HIGH (ref 0.5–1.3)
DIFF PNL FLD: SIGNIFICANT CHANGE UP
EGFR: 53 ML/MIN/1.73M2 — LOW
EOSINOPHIL # BLD AUTO: 0.05 K/UL — SIGNIFICANT CHANGE UP (ref 0–0.5)
EOSINOPHIL NFR BLD AUTO: 0.4 % — SIGNIFICANT CHANGE UP (ref 0–6)
ESTIMATED AVERAGE GLUCOSE: 131 MG/DL — HIGH (ref 68–114)
FERRITIN SERPL-MCNC: 1653 NG/ML — HIGH (ref 30–400)
GLUCOSE SERPL-MCNC: 123 MG/DL — HIGH (ref 70–99)
GLUCOSE UR QL: NEGATIVE — SIGNIFICANT CHANGE UP
HCT VFR BLD CALC: 39.8 % — SIGNIFICANT CHANGE UP (ref 39–50)
HGB BLD-MCNC: 12.3 G/DL — LOW (ref 13–17)
IMM GRANULOCYTES NFR BLD AUTO: 2.4 % — HIGH (ref 0–0.9)
INR BLD: 1.72 RATIO — HIGH (ref 0.88–1.16)
IRON SATN MFR SERPL: 17 UG/DL — LOW (ref 45–165)
IRON SATN MFR SERPL: 7 % — LOW (ref 16–55)
KETONES UR-MCNC: SIGNIFICANT CHANGE UP
LDH SERPL L TO P-CCNC: 486 U/L — HIGH (ref 50–242)
LEUKOCYTE ESTERASE UR-ACNC: NEGATIVE — SIGNIFICANT CHANGE UP
LYMPHOCYTES # BLD AUTO: 0.21 K/UL — LOW (ref 1–3.3)
LYMPHOCYTES # BLD AUTO: 1.7 % — LOW (ref 13–44)
MAGNESIUM SERPL-MCNC: 1.8 MG/DL — SIGNIFICANT CHANGE UP (ref 1.6–2.6)
MCHC RBC-ENTMCNC: 27.2 PG — SIGNIFICANT CHANGE UP (ref 27–34)
MCHC RBC-ENTMCNC: 30.9 GM/DL — LOW (ref 32–36)
MCV RBC AUTO: 87.9 FL — SIGNIFICANT CHANGE UP (ref 80–100)
MONOCYTES # BLD AUTO: 0.75 K/UL — SIGNIFICANT CHANGE UP (ref 0–0.9)
MONOCYTES NFR BLD AUTO: 5.9 % — SIGNIFICANT CHANGE UP (ref 2–14)
NEUTROPHILS # BLD AUTO: 11.26 K/UL — HIGH (ref 1.8–7.4)
NEUTROPHILS NFR BLD AUTO: 88.7 % — HIGH (ref 43–77)
NITRITE UR-MCNC: NEGATIVE — SIGNIFICANT CHANGE UP
NRBC # BLD: 0 /100 WBCS — SIGNIFICANT CHANGE UP (ref 0–0)
PH UR: 6 — SIGNIFICANT CHANGE UP (ref 5–8)
PHOSPHATE SERPL-MCNC: 4.1 MG/DL — SIGNIFICANT CHANGE UP (ref 2.5–4.5)
PLATELET # BLD AUTO: 195 K/UL — SIGNIFICANT CHANGE UP (ref 150–400)
POTASSIUM SERPL-MCNC: 3.5 MMOL/L — SIGNIFICANT CHANGE UP (ref 3.5–5.3)
POTASSIUM SERPL-SCNC: 3.5 MMOL/L — SIGNIFICANT CHANGE UP (ref 3.5–5.3)
PROT SERPL-MCNC: 8.5 G/DL — HIGH (ref 6–8.3)
PROT UR-MCNC: ABNORMAL
PROTHROM AB SERPL-ACNC: 19.9 SEC — HIGH (ref 10.5–13.4)
RAPID RVP RESULT: SIGNIFICANT CHANGE UP
RBC # BLD: 4.53 M/UL — SIGNIFICANT CHANGE UP (ref 4.2–5.8)
RBC # FLD: 20.1 % — HIGH (ref 10.3–14.5)
RETICS #: 14.6 K/UL — LOW (ref 25–125)
RETICS/RBC NFR: 0.3 % — LOW (ref 0.5–2.5)
SARS-COV-2 RNA SPEC QL NAA+PROBE: SIGNIFICANT CHANGE UP
SODIUM SERPL-SCNC: 141 MMOL/L — SIGNIFICANT CHANGE UP (ref 135–145)
SP GR SPEC: 1.03 — HIGH (ref 1.01–1.02)
TIBC SERPL-MCNC: 245 UG/DL — SIGNIFICANT CHANGE UP (ref 220–430)
TRANSFERRIN SERPL-MCNC: 182 MG/DL — LOW (ref 200–360)
TROPONIN T, HIGH SENSITIVITY RESULT: 84 NG/L — HIGH (ref 0–51)
UIBC SERPL-MCNC: 228 UG/DL — SIGNIFICANT CHANGE UP (ref 110–370)
URATE SERPL-MCNC: 7.1 MG/DL — SIGNIFICANT CHANGE UP (ref 3.4–8.8)
UROBILINOGEN FLD QL: SIGNIFICANT CHANGE UP
WBC # BLD: 12.69 K/UL — HIGH (ref 3.8–10.5)
WBC # FLD AUTO: 12.69 K/UL — HIGH (ref 3.8–10.5)

## 2022-10-29 PROCEDURE — 12345: CPT | Mod: NC,GC

## 2022-10-29 PROCEDURE — 99223 1ST HOSP IP/OBS HIGH 75: CPT | Mod: GC

## 2022-10-29 RX ORDER — ATORVASTATIN CALCIUM 80 MG/1
40 TABLET, FILM COATED ORAL AT BEDTIME
Refills: 0 | Status: DISCONTINUED | OUTPATIENT
Start: 2022-10-29 | End: 2022-11-08

## 2022-10-29 RX ORDER — ALLOPURINOL 300 MG
100 TABLET ORAL DAILY
Refills: 0 | Status: DISCONTINUED | OUTPATIENT
Start: 2022-10-29 | End: 2022-10-31

## 2022-10-29 RX ORDER — ACYCLOVIR SODIUM 500 MG
400 VIAL (EA) INTRAVENOUS
Refills: 0 | Status: DISCONTINUED | OUTPATIENT
Start: 2022-10-29 | End: 2022-11-08

## 2022-10-29 RX ORDER — CHLORHEXIDINE GLUCONATE 213 G/1000ML
1 SOLUTION TOPICAL DAILY
Refills: 0 | Status: DISCONTINUED | OUTPATIENT
Start: 2022-10-29 | End: 2022-11-08

## 2022-10-29 RX ORDER — ACETAMINOPHEN 500 MG
1000 TABLET ORAL ONCE
Refills: 0 | Status: COMPLETED | OUTPATIENT
Start: 2022-10-29 | End: 2022-10-29

## 2022-10-29 RX ORDER — METOPROLOL TARTRATE 50 MG
100 TABLET ORAL DAILY
Refills: 0 | Status: DISCONTINUED | OUTPATIENT
Start: 2022-10-29 | End: 2022-11-08

## 2022-10-29 RX ORDER — SENNA PLUS 8.6 MG/1
2 TABLET ORAL AT BEDTIME
Refills: 0 | Status: DISCONTINUED | OUTPATIENT
Start: 2022-10-29 | End: 2022-11-08

## 2022-10-29 RX ORDER — APIXABAN 2.5 MG/1
2.5 TABLET, FILM COATED ORAL EVERY 12 HOURS
Refills: 0 | Status: DISCONTINUED | OUTPATIENT
Start: 2022-10-29 | End: 2022-10-31

## 2022-10-29 RX ORDER — PIPERACILLIN AND TAZOBACTAM 4; .5 G/20ML; G/20ML
3.38 INJECTION, POWDER, LYOPHILIZED, FOR SOLUTION INTRAVENOUS EVERY 8 HOURS
Refills: 0 | Status: COMPLETED | OUTPATIENT
Start: 2022-10-29 | End: 2022-11-05

## 2022-10-29 RX ORDER — SODIUM CHLORIDE 9 MG/ML
250 INJECTION, SOLUTION INTRAVENOUS ONCE
Refills: 0 | Status: COMPLETED | OUTPATIENT
Start: 2022-10-29 | End: 2022-10-29

## 2022-10-29 RX ADMIN — Medication 400 MILLIGRAM(S): at 22:00

## 2022-10-29 RX ADMIN — Medication 250 MILLIGRAM(S): at 05:51

## 2022-10-29 RX ADMIN — Medication 400 MILLIGRAM(S): at 05:48

## 2022-10-29 RX ADMIN — Medication 100 MILLIGRAM(S): at 05:48

## 2022-10-29 RX ADMIN — CHLORHEXIDINE GLUCONATE 1 APPLICATION(S): 213 SOLUTION TOPICAL at 11:48

## 2022-10-29 RX ADMIN — Medication 400 MILLIGRAM(S): at 12:11

## 2022-10-29 RX ADMIN — PIPERACILLIN AND TAZOBACTAM 25 GRAM(S): 4; .5 INJECTION, POWDER, LYOPHILIZED, FOR SOLUTION INTRAVENOUS at 18:59

## 2022-10-29 RX ADMIN — Medication 1000 MILLIGRAM(S): at 13:30

## 2022-10-29 RX ADMIN — SODIUM CHLORIDE 125 MILLILITER(S): 9 INJECTION, SOLUTION INTRAVENOUS at 01:57

## 2022-10-29 RX ADMIN — APIXABAN 2.5 MILLIGRAM(S): 2.5 TABLET, FILM COATED ORAL at 05:51

## 2022-10-29 RX ADMIN — Medication 400 MILLIGRAM(S): at 19:00

## 2022-10-29 RX ADMIN — Medication 1000 MILLIGRAM(S): at 22:55

## 2022-10-29 RX ADMIN — PIPERACILLIN AND TAZOBACTAM 25 GRAM(S): 4; .5 INJECTION, POWDER, LYOPHILIZED, FOR SOLUTION INTRAVENOUS at 11:48

## 2022-10-29 RX ADMIN — PIPERACILLIN AND TAZOBACTAM 25 GRAM(S): 4; .5 INJECTION, POWDER, LYOPHILIZED, FOR SOLUTION INTRAVENOUS at 01:57

## 2022-10-29 RX ADMIN — APIXABAN 2.5 MILLIGRAM(S): 2.5 TABLET, FILM COATED ORAL at 19:00

## 2022-10-29 RX ADMIN — ATORVASTATIN CALCIUM 40 MILLIGRAM(S): 80 TABLET, FILM COATED ORAL at 22:03

## 2022-10-29 NOTE — PATIENT PROFILE ADULT - FALL HARM RISK - HARM RISK INTERVENTIONS

## 2022-10-29 NOTE — PROGRESS NOTE ADULT - PROBLEM SELECTOR PLAN 4
Pt has hx of Follicular lymphoma with DLBCL treated with R-CHOP in 2003, with relapse in 2009, treated with BR.  Now with multiple areas of palpable lymphadenopathy - at least follicular lymphoma grade IIIA.    -will place Onc consult given pt s/p Cycle 3 day 1 planned for 10/21/22  -Chemo regimen consists of: modified regimen of Obinutuzumab, cyclophosphamide, Etoposide, vincristine and 100 mg prednisone on days 1–5 per outpatient Onc documentation. Pt Hgb 9.5 on admission, most recently in 11 but tends to range in 9-11 range. No signs of bleeding, INR mildly elevated at 2.11. Possibly myelosuppression 2/2 sepsis in setting of recent chemotherapy and cancer.    -Trend H/H  -FOBT in 8/2022 negative  -will order iron panel with ferritin, outpatient B12/folate/TSH wnl

## 2022-10-29 NOTE — H&P ADULT - PROBLEM SELECTOR PLAN 10
DVT Ppx: eliquis  Diet: bedside dysphagia -> DASH diet Will f/u med rec in AM with daughter/pharmacy, could not reach daughter overnight and pt unable to remember his medications given AMS.

## 2022-10-29 NOTE — H&P ADULT - PROBLEM SELECTOR PLAN 2
Pt recently admitted in 9/2022 for similar presentation with AMS 2/2 positive rhino/enterovirus, seems to have improved and was able to be reoriented towards the end of admission. Current AMS likely in setting of sepsis 2/2 pneumonia    -Sepsis workup as above  -RVP negative  -f/u with daughter Vonnie in AM for collateral on pt's recent baseline mental status (new normal)  -fall precautions

## 2022-10-29 NOTE — PROGRESS NOTE ADULT - PROBLEM SELECTOR PLAN 7
TTE 9/2022: EF 30%, severe global left ventricular systolic dysfunction. Mild RV enlargement with decreased RV systolic function      - c/w Toprol  mg daily, atorvastatin 40 mg daily  - hold Entresto 49/51 PO BID, spironolactone 25 mg Pt has documented hx of T2DM, previous A1Cs elevated (10 -> 7.4 in 2010) in diabetes range but most recent 8/2022 is 5.4    -ISS, will recheck A1C

## 2022-10-29 NOTE — PROGRESS NOTE ADULT - SUBJECTIVE AND OBJECTIVE BOX
***************************************************************  Jaidenasencio (Ji-Cheng) [employer] PGY1  Internal Medicine   ***************************************************************    NIK GRIMM  72y  MRN: [id number]    Patient is a 72y old  Male who presents with a chief complaint of hypoxia (29 Oct 2022 02:04)      Subjective: no events ON. Denies fever, CP, SOB, abn pain, N/V, dysuria. Tolerating diet.      MEDICATIONS  (STANDING):  acyclovir   Oral Tab/Cap 400 milliGRAM(s) Oral two times a day  allopurinol 100 milliGRAM(s) Oral daily  apixaban 2.5 milliGRAM(s) Oral every 12 hours  atorvastatin 40 milliGRAM(s) Oral at bedtime  chlorhexidine 2% Cloths 1 Application(s) Topical daily  metoprolol succinate  milliGRAM(s) Oral daily  piperacillin/tazobactam IVPB.. 3.375 Gram(s) IV Intermittent every 8 hours    MEDICATIONS  (PRN):  senna 2 Tablet(s) Oral at bedtime PRN Constipation      Objective:    Vitals: Vital Signs Last 24 Hrs  T(C): 36.7 (10-29-22 @ 04:31), Max: 39.2 (10-28-22 @ 20:22)  T(F): 98.1 (10-29-22 @ 04:31), Max: 102.6 (10-28-22 @ 20:22)  HR: 105 (10-29-22 @ :31) (70 - 105)  BP: 148/69 (10-29-22 @ 04:31) (127/56 - 162/75)  BP(mean): 88 (10-29-22 @ 03:08) (76 - 88)  RR: 18 (10-29-22 @ 04:31) (16 - 22)  SpO2: 99% (10-29-22 @ 04:31) (93% - 100%)            I&O's Summary    28 Oct 2022 07:01  -  29 Oct 2022 06:56  --------------------------------------------------------  IN: 0 mL / OUT: 200 mL / NET: -200 mL        PHYSICAL EXAM:  GENERAL: NAD  HEAD:  Atraumatic, Normocephalic  EYES: EOMI, conjunctiva and sclera clear  CHEST/LUNG: Clear to auscultation bilaterally; No rales, rhonchi, wheezing, or rubs  HEART: Regular rate and rhythm; No murmurs, rubs, or gallops  ABDOMEN: Soft, Nontender, Nondistended;   SKIN: No rashes or lesions  NERVOUS SYSTEM:  Alert & Oriented X3, no focal deficits    LABS:  10-29    141  |  99  |  31<H>  ----------------------------<  123<H>  3.5   |  25  |  1.42<H>  10-28    140  |  99  |  32<H>  ----------------------------<  142<H>  3.5   |  25  |  1.49<H>    Ca    9.9      29 Oct 2022 06:00  Ca    9.0      28 Oct 2022 21:34  Phos  4.1     10-29  Mg     1.8     10-29    TPro  8.5<H>  /  Alb  4.1  /  TBili  2.0<H>  /  DBili  x   /  AST  52<H>  /  ALT  43  /  AlkPhos  222<H>  10-29  TPro  6.8  /  Alb  3.5  /  TBili  1.6<H>  /  DBili  x   /  AST  33  /  ALT  35  /  AlkPhos  178<H>  10-28      PT/INR - ( 29 Oct 2022 05:59 )   PT: 19.9 sec;   INR: 1.72 ratio         PTT - ( 29 Oct 2022 05:59 )  PTT:33.9 sec              Urinalysis Basic - ( 28 Oct 2022 23:43 )    Color: Yellow / Appearance: Clear / S.034 / pH: x  Gluc: x / Ketone: Trace  / Bili: Negative / Urobili: <2 mg/dL   Blood: x / Protein: 300 mg/dL / Nitrite: Negative   Leuk Esterase: Negative / RBC: 0-2 /hpf / WBC 0-2   Sq Epi: x / Non Sq Epi: Occasional / Bacteria: Negative                              12.3   12.69 )-----------( 195      ( 29 Oct 2022 05:59 )             39.8                         9.5    14.00 )-----------( 143      ( 28 Oct 2022 21:34 )             30.3     CAPILLARY BLOOD GLUCOSE          RADIOLOGY & ADDITIONAL TESTS:    Imaging Personally Reviewed:  [x ] YES  [ ] NO    Consultants involved in case:   Consultant(s) Notes Reviewed:  [ x] YES  [ ] NO:   Care Discussed with Consultants/Other Providers [x ] YES  [ ] NO         ***************************************************************  Hwang (Ji-Cheng) [facility] PGY1  Internal Medicine   ***************************************************************    NIK GRIMM  72y  MRN: [id number]    72 year old male with afib (on eliquis), HTN,  complete heart block s/p PPM, HLD, HFrEF (30% in 2022), and DLBCL (tx with R-CHOP in , with relapse in  treated with BR) now with recently diagnosed grade 3 follicular lymphoma BIBEMS with fever and AMS after being found down by his daughter this AM. Presentation concerning for toxic metabolic encephalopathy in setting of sepsis possible 2/2 pneumonia.    Subjective: NAEO. No pain but poor historian, AOx1.     MEDICATIONS  (STANDING):  acyclovir   Oral Tab/Cap 400 milliGRAM(s) Oral two times a day  allopurinol 100 milliGRAM(s) Oral daily  apixaban 2.5 milliGRAM(s) Oral every 12 hours  atorvastatin 40 milliGRAM(s) Oral at bedtime  chlorhexidine 2% Cloths 1 Application(s) Topical daily  metoprolol succinate  milliGRAM(s) Oral daily  piperacillin/tazobactam IVPB.. 3.375 Gram(s) IV Intermittent every 8 hours    MEDICATIONS  (PRN):  senna 2 Tablet(s) Oral at bedtime PRN Constipation      Objective:    Vitals: Vital Signs Last 24 Hrs  T(C): 36.7 (10-29-22 @ 04:31), Max: 39.2 (10-28-22 @ 20:22)  T(F): 98.1 (10-29-22 @ 04:31), Max: 102.6 (10-28-22 @ 20:22)  HR: 105 (10-29-22 @ 04:31) (70 - 105)  BP: 148/69 (10-29-22 @ 04:31) (127/56 - 162/75)  BP(mean): 88 (10-29-22 @ 03:08) (76 - 88)  RR: 18 (10-29-22 @ 04:31) (16 - 22)  SpO2: 99% (10-29-22 @ 04:31) (93% - 100%)            I&O's Summary    28 Oct 2022 07:01  -  29 Oct 2022 06:56  --------------------------------------------------------  IN: 0 mL / OUT: 200 mL / NET: -200 mL        PHYSICAL EXAM:  GENERAL: NAD  HEAD:  Atraumatic, Normocephalic  EYES: EOMI, conjunctiva and sclera clear  CHEST/LUNG: Clear to auscultation bilaterally; No rales, rhonchi, wheezing, or rubs  HEART: Regular rate and rhythm; No murmurs, rubs, or gallops  ABDOMEN: Soft, Nontender, Nondistended;   SKIN: No rashes or lesions  NERVOUS SYSTEM:  Alert & Oriented X3, no focal deficits    LABS:  10-29    141  |  99  |  31<H>  ----------------------------<  123<H>  3.5   |  25  |  1.42<H>  10-28    140  |  99  |  32<H>  ----------------------------<  142<H>  3.5   |  25  |  1.49<H>    Ca    9.9      29 Oct 2022 06:00  Ca    9.0      28 Oct 2022 21:34  Phos  4.1     10-29  Mg     1.8     10-29    TPro  8.5<H>  /  Alb  4.1  /  TBili  2.0<H>  /  DBili  x   /  AST  52<H>  /  ALT  43  /  AlkPhos  222<H>  10-29  TPro  6.8  /  Alb  3.5  /  TBili  1.6<H>  /  DBili  x   /  AST  33  /  ALT  35  /  AlkPhos  178<H>  10-28      PT/INR - ( 29 Oct 2022 05:59 )   PT: 19.9 sec;   INR: 1.72 ratio         PTT - ( 29 Oct 2022 05:59 )  PTT:33.9 sec              Urinalysis Basic - ( 28 Oct 2022 23:43 )    Color: Yellow / Appearance: Clear / S.034 / pH: x  Gluc: x / Ketone: Trace  / Bili: Negative / Urobili: <2 mg/dL   Blood: x / Protein: 300 mg/dL / Nitrite: Negative   Leuk Esterase: Negative / RBC: 0-2 /hpf / WBC 0-2   Sq Epi: x / Non Sq Epi: Occasional / Bacteria: Negative                              12.3   12.69 )-----------( 195      ( 29 Oct 2022 05:59 )             39.8                         9.5    14.00 )-----------( 143      ( 28 Oct 2022 21:34 )             30.3     CAPILLARY BLOOD GLUCOSE          RADIOLOGY & ADDITIONAL TESTS:    Imaging Personally Reviewed:  [x ] YES  [ ] NO    Consultants involved in case:   Consultant(s) Notes Reviewed:  [ x] YES  [ ] NO:   Care Discussed with Consultants/Other Providers [x ] YES  [ ] NO

## 2022-10-29 NOTE — H&P ADULT - PROBLEM SELECTOR PLAN 3
Pt has hx of Follicular lymphoma with DLBCL treated with R-CHOP in 2003, with relapse in 2009, treated with BR.  Now with multiple areas of palpable lymphadenopathy - at least follicular lymphoma grade IIIA.    -will place Onc consult given pt s/p Cycle 3 day 1 planned for 10/21/22  -Chemo regimen consists of: modified regimen of Obinutuzumab, cyclophosphamide, Etoposide, vincristine and 100 mg prednisone on days 1–5 per outpatient Onc documentation.  -will f/u med rec in AM with daughter/pharmacy, could not reach daughter overnight and pt unable to remember his medications given AMS.  -will check AM cortisol given pt may have taken prednisone recently. Pt has hx of Follicular lymphoma with DLBCL treated with R-CHOP in 2003, with relapse in 2009, treated with BR.  Now with multiple areas of palpable lymphadenopathy - at least follicular lymphoma grade IIIA.    -will place Onc consult given pt s/p Cycle 3 day 1 planned for 10/21/22  -Chemo regimen consists of: modified regimen of Obinutuzumab, cyclophosphamide, Etoposide, vincristine and 100 mg prednisone on days 1–5 per outpatient Onc documentation. Pt's fall was unwitnessed, unclear if pt syncopized or had seizure. CT Head and c-spine unremarkable.    -Toxic metabolic/sepsis workup as above  -will order EEG, TTE, trend trops  -continue to monitor on telemetry  -consider neuro consult in AM Pt's fall was unwitnessed, unclear if pt syncopized or had seizure. CT Head and c-spine unremarkable.    -Toxic metabolic/sepsis workup as above  -will order EEG, TTE, trend trops  -PPM interrogation in AM  -continue to monitor on telemetry  -consider neuro consult in AM

## 2022-10-29 NOTE — PROGRESS NOTE ADULT - PROBLEM SELECTOR PLAN 3
Pt's fall was unwitnessed, unclear if pt syncopized or had seizure. CT Head and c-spine unremarkable.    -Toxic metabolic/sepsis workup as above  -will order EEG, TTE, trend trops  -continue to monitor on telemetry  -consider neuro consult in AM Pt has hx of Follicular lymphoma with DLBCL treated with R-CHOP in 2003, with relapse in 2009, treated with BR.  Now with multiple areas of palpable lymphadenopathy - at least follicular lymphoma grade IIIA.    -Chemo regimen consists of: modified regimen of Obinutuzumab, cyclophosphamide, Etoposide, vincristine and 100 mg prednisone on days 1–5 per outpatient Onc documentation.  -onc consulted appreciate recommendations: infectious work-up per primary team

## 2022-10-29 NOTE — H&P ADULT - PROBLEM SELECTOR PLAN 7
Pt has documented hx of T2DM, previous A1Cs elevated (10 -> 7.4 in 2010) in diabetes range but most recent 8/2022 is 5.4    -If FSG elevated can consider ISS, repeat A1C Pt has documented hx of T2DM, previous A1Cs elevated (10 -> 7.4 in 2010) in diabetes range but most recent 8/2022 is 5.4    -ISS, will recheck A1C TTE 9/2022: EF 30%, severe global left ventricular systolic dysfunction. Mild RV enlargement with decreased RV systolic function      - c/w Toprol  mg daily, atorvastatin 40 mg daily  - hold Entresto 49/51 PO BID, spironolactone 25 mg

## 2022-10-29 NOTE — PROGRESS NOTE ADULT - PROBLEM SELECTOR PLAN 9
-c/w home toprol  mg daily and eliquis 5 mg BID Will f/u med rec in AM with daughter/pharmacy, could not reach daughter overnight and pt unable to remember his medications given AMS.

## 2022-10-29 NOTE — H&P ADULT - NSHPREVIEWOFSYSTEMS_GEN_ALL_CORE
REVIEW OF SYSTEMS:    CONSTITUTIONAL: No weakness, fevers or chills  EYES: No visual changes; no sclera icterics, no pain or drainage  ENT:  No vertigo or throat pain   NECK: No pain or stiffness  RESPIRATORY: No cough, wheezing, hemoptysis; No shortness of breath  CARDIOVASCULAR: No chest pain or palpitations  GASTROINTESTINAL: No abdominal or epigastric pain. No nausea, vomiting, or hematemesis; No diarrhea or constipation. No melena or hematochezia.  GENITOURINARY: No dysuria, frequency or hematuria  NEUROLOGICAL: No numbness or weakness  SKIN: No itching, rashes  Psych: No anxiety or depression REVIEW OF SYSTEMS:    CONSTITUTIONAL: No generalized weakness, +fevers  EYES: No visual changes; no sclera icterics, no pain or drainage  ENT:  No vertigo or throat pain   NECK: No pain or stiffness  RESPIRATORY: No cough, wheezing, hemoptysis; No shortness of breath  CARDIOVASCULAR: No chest pain or palpitations  GASTROINTESTINAL: No abdominal or epigastric pain. No nausea, vomiting, or hematemesis; No diarrhea or constipation. No melena or hematochezia.  GENITOURINARY: No dysuria, frequency or hematuria  NEUROLOGICAL: No numbness or weakness  SKIN: No itching, rashes  Psych: No anxiety or depression

## 2022-10-29 NOTE — H&P ADULT - PROBLEM SELECTOR PROBLEM 8
Need for prophylactic measure Atrial flutter, unspecified type DM type 2 (diabetes mellitus, type 2)

## 2022-10-29 NOTE — H&P ADULT - PROBLEM SELECTOR PROBLEM 7
CKD (chronic kidney disease), stage III DM type 2 (diabetes mellitus, type 2) HFrEF (heart failure with reduced ejection fraction)

## 2022-10-29 NOTE — H&P ADULT - NSHPSOCIALHISTORY_GEN_ALL_CORE
Patient reports ambulating by himself at baseline, lives alone.  Daughter Vonnie helps with healthcare.  Wife lives in [place]

## 2022-10-29 NOTE — H&P ADULT - NSHPLABSRESULTS_GEN_ALL_CORE
Personally reviewed labs, imaging, ekg                           9.5    14.00 )-----------( 143      ( 28 Oct 2022 21:34 )             30.3       10-28    140  |  99  |  32<H>  ----------------------------<  142<H>  3.5   |  25  |  1.49<H>    Ca    9.0      28 Oct 2022 21:34    TPro  6.8  /  Alb  3.5  /  TBili  1.6<H>  /  DBili  x   /  AST  33  /  ALT  35  /  AlkPhos  178<H>  10-28              Urinalysis Basic - ( 28 Oct 2022 23:43 )    Color: Yellow / Appearance: Clear / S.034 / pH: x  Gluc: x / Ketone: Trace  / Bili: Negative / Urobili: <2 mg/dL   Blood: x / Protein: 300 mg/dL / Nitrite: Negative   Leuk Esterase: Negative / RBC: 0-2 /hpf / WBC 0-2   Sq Epi: x / Non Sq Epi: Occasional / Bacteria: Negative        PT/INR - ( 28 Oct 2022 21:34 )   PT: 24.4 sec;   INR: 2.11 ratio         PTT - ( 28 Oct 2022 21:34 )  PTT:34.9 sec    Lactate Trend      CARDIAC MARKERS ( 28 Oct 2022 21:34 )  x     / x     / 590 U/L / x     / x            CAPILLARY BLOOD GLUCOSE              < from: CT Angio Chest PE Protocol w/ IV Cont (10.28.22 @ 22:23) >    IMPRESSION:    No pulmonary embolism though evaluation of subsegmental branches is   limited secondary to timing of the bolus.    Interval consolidative and groundglass opacities in the right lower lobe   concerning for developing pneumonia. Pulmonary imaging follow-up in 6   weeks is advised demonstrate resolution.    Continued moderate loculated effusion along the minor fissure, slightly   progressed compared to prior study. Small left pleural effusion with   associated passive atelectasis of the left lung base.    Cholelithiasis.    Hepatosplenomegaly.    Additional findings as mentioned above.    < end of copied text >    < from: CT Cervical Spine No Cont (10.28.22 @ 22:23) >    CT CERVICAL SPINE: No acute cervical spine fracture, subluxation or   evidence of traumatic malalignment. Multilevel degenerative changes as   described above. Provided no contraindications, MRI can be performed if   there is concern for subtle osseous, ligamentous or cord injury.    < end of copied text >    < from: CT Head No Cont (10.28.22 @ 22:22) >    IMPRESSION:    CT BRAIN: No acute intracranial bleeding, mass effect, or acute calvarium   fracture. Involutional and chronic microvascular ischemic gliotic changes.    CT CERVICAL SPINE: No acute cervical spine fracture, subluxation or   evidence of traumatic malalignment. Multilevel degenerative changes as   described above. Provided no contraindications, MRI can be performed if   there is concern for subtle osseous, ligamentous or cord injury.    < end of copied text > Personally reviewed labs, imaging, ekg                           9.5    14.00 )-----------( 143      ( 28 Oct 2022 21:34 )             30.3       10-28    140  |  99  |  32<H>  ----------------------------<  142<H>  3.5   |  25  |  1.49<H>    Ca    9.0      28 Oct 2022 21:34    TPro  6.8  /  Alb  3.5  /  TBili  1.6<H>  /  DBili  x   /  AST  33  /  ALT  35  /  AlkPhos  178<H>  10-28              Urinalysis Basic - ( 28 Oct 2022 23:43 )    Color: Yellow / Appearance: Clear / S.034 / pH: x  Gluc: x / Ketone: Trace  / Bili: Negative / Urobili: <2 mg/dL   Blood: x / Protein: 300 mg/dL / Nitrite: Negative   Leuk Esterase: Negative / RBC: 0-2 /hpf / WBC 0-2   Sq Epi: x / Non Sq Epi: Occasional / Bacteria: Negative        PT/INR - ( 28 Oct 2022 21:34 )   PT: 24.4 sec;   INR: 2.11 ratio         PTT - ( 28 Oct 2022 21:34 )  PTT:34.9 sec    Lactate Trend      CARDIAC MARKERS ( 28 Oct 2022 21:34 )  x     / x     / 590 U/L / x     / x            CAPILLARY BLOOD GLUCOSE        IMAGING/ADDITIONAL TESTS:    EKG (10/28) personally reviewed: A-sensed, V-paced, HR 98    CXR (10/28) personally reviewed: R pleural effusion w/ loculated component along R minor fissure appreciated; R mid/lower lung field haziness      < from: CT Angio Chest PE Protocol w/ IV Cont (10.28.22 @ 22:23) >  IMPRESSION:    No pulmonary embolism though evaluation of subsegmental branches is   limited secondary to timing of the bolus.    Interval consolidative and groundglass opacities in the right lower lobe   concerning for developing pneumonia. Pulmonary imaging follow-up in 6   weeks is advised demonstrate resolution.    Continued moderate loculated effusion along the minor fissure, slightly   progressed compared to prior study. Small left pleural effusion with   associated passive atelectasis of the left lung base.    Cholelithiasis.    Hepatosplenomegaly.    Additional findings as mentioned above.  < end of copied text >    < from: CT Cervical Spine No Cont (10.28.22 @ 22:23)   CT CERVICAL SPINE: No acute cervical spine fracture, subluxation or   evidence of traumatic malalignment. Multilevel degenerative changes as   described above. Provided no contraindications, MRI can be performed if   there is concern for subtle osseous, ligamentous or cord injury.  < end of copied text >    < from: CT Head No Cont (10.28.22 @ 22:22) >  IMPRESSION:    CT BRAIN: No acute intracranial bleeding, mass effect, or acute calvarium   fracture. Involutional and chronic microvascular ischemic gliotic changes.    CT CERVICAL SPINE: No acute cervical spine fracture, subluxation or   evidence of traumatic malalignment. Multilevel degenerative changes as   described above. Provided no contraindications, MRI can be performed if   there is concern for subtle osseous, ligamentous or cord injury.  < end of copied text >

## 2022-10-29 NOTE — H&P ADULT - NSHPPHYSICALEXAM_GEN_ALL_CORE
VITALS:   T(C): 36.9 (10-28-22 @ 23:57), Max: 39.2 (10-28-22 @ 20:22)  HR: 75 (10-29-22 @ 03:08) (70 - 97)  BP: 151/66 (10-29-22 @ 03:08) (127/56 - 162/75)  RR: 17 (10-29-22 @ 03:08) (16 - 22)  SpO2: 96% (10-29-22 @ 03:08) (93% - 97%)    GENERAL: NAD, lying in bed comfortably  HEAD:  Atraumatic, Normocephalic  EYES: EOMI, PERRLA, conjunctiva and sclera clear  ENT: Moist mucous membranes  NECK: Supple, No JVD  CHEST/LUNG: mild bibasilar crackles; No rales, rhonchi, wheezing, or rubs. Unlabored respirations  HEART: Regular rate and rhythm; No murmurs, rubs, or gallops  ABDOMEN: BSx4; Soft, nontender, nondistended  EXTREMITIES:  2+ Peripheral Pulses, brisk capillary refill. No clubbing, cyanosis, or edema  NERVOUS SYSTEM:  A&Ox2, knows name and location but not date/year, no focal deficits. CN II - XII intact  SKIN: No rashes or lesions  psych: normal behavior, normal affect VITALS:   T(C): 36.9 (10-28-22 @ 23:57), Max: 39.2 (10-28-22 @ 20:22)  HR: 75 (10-29-22 @ 03:08) (70 - 97)  BP: 151/66 (10-29-22 @ 03:08) (127/56 - 162/75)  RR: 17 (10-29-22 @ 03:08) (16 - 22)  SpO2: 96% (10-29-22 @ 03:08) (93% - 97%)    GENERAL: NAD, lying in bed comfortably  HEAD:  Atraumatic, Normocephalic  EYES: EOMI, PERRL, conjunctiva and sclera clear  ENT: Moist mucous membranes, no pharyngeal exudates  NECK: C-collar in place  CHEST/LUNG: mild bibasilar crackles; No rales, rhonchi, wheezing, or rubs. Unlabored respirations  CARDIOVASCULAR: Regular rate and rhythm; No murmurs, rubs, or gallops; 2+ Peripheral Pulses, brisk capillary refill  ABDOMEN: BSx4; Soft, nontender, nondistended  MSK: No clubbing or cyanosis of digits, no joint tenderness or swelling  PSYCH: A&Ox2, knows name and location but not date/year, normal affect   NERVOUS SYSTEM: CN II - XII intact; sensation to light touch grossly intact throughout; no focal deficits appreciated  SKIN: No rashes or lesions

## 2022-10-29 NOTE — H&P ADULT - PROBLEM SELECTOR PLAN 6
TTE 9/2022: EF 30%, severe global left ventricular systolic dysfunction. Mild RV enlargement with decreased RV systolic function      - c/w Toprol  mg daily, Entresto 49/51 PO BID, spironolactone 25 mg, atorvastatin 40 mg daily TTE 9/2022: EF 30%, severe global left ventricular systolic dysfunction. Mild RV enlargement with decreased RV systolic function      - c/w Toprol  mg daily, atorvastatin 40 mg daily  - hold Entresto 49/51 PO BID, spironolactone 25 mg On outpatient documentation pt reportedly has CKD3 with baseline SCr 1.5. On admission, pt's SCr was 1.49, elevated compared to recent values of 1.09. Possibly prerenal STEPHEN i/s/o sepsis vs intrinsic disease 2/2 contrast load or chemo side effect.    -Trend SCr  -Can consider urine lytes, nephro consult if worsening  -bladder scan to r/o obstructive STEPHEN

## 2022-10-29 NOTE — PROGRESS NOTE ADULT - PROBLEM SELECTOR PLAN 10
Will f/u med rec in AM with daughter/pharmacy, could not reach daughter overnight and pt unable to remember his medications given AMS. DVT Ppx: eliquis  Diet: Soft and bite sized diet DVT Ppx: eliquis  Diet: Soft and bite sized diet  Communication: Spoke with daughter 10/29: 6:30PM

## 2022-10-29 NOTE — H&P ADULT - PROBLEM SELECTOR PROBLEM 6
Atrial flutter, unspecified type HFrEF (heart failure with reduced ejection fraction) Acute kidney injury superimposed on CKD

## 2022-10-29 NOTE — PROGRESS NOTE ADULT - PROBLEM SELECTOR PLAN 2
Pt recently admitted in 9/2022 for similar presentation with AMS 2/2 positive rhino/enterovirus, seems to have improved and was able to be reoriented towards the end of admission. Current AMS likely in setting of sepsis 2/2 pneumonia    -Sepsis workup as above  -RVP negative  -f/u with daughter Vonnie in AM for collateral on pt's recent baseline mental status (new normal)  -fall precautions -Pt recently admitted in 9/2022 for similar presentation with AMS 2/2 positive rhino/enterovirus, seems to have improved and was able to be reoriented towards the end of admission. Current AMS likely in setting of sepsis 2/2 pneumonia  -Sepsis workup as above

## 2022-10-29 NOTE — H&P ADULT - ATTENDING COMMENTS
72M w/ Afib/aflutter (on Eliquis), HTN, complete heart block s/p PPM, HLD, HFrEF (30% in 09/2022), and DLBCL (tx with R-CH[facility] in 2003, with relapse in 2009 treated with BR) now with recently diagnosed grade 3 follicular lymphoma, presenting with fever and AMS after being found down by his daughter on the morning of presentation. Unable to obtain meaningful hx from pt; was AAOx1 on my encounter (only oriented to self). Unable to reach daughter via phone overnight. Per chart review, daughter found pt on floor with episode of urinary incontinence. Recently received chemo on 10/21/22. Of note, was recently admitted (9/16/22-10/3/22) for AMS initially believed to be in setting of entero/rhinovirus, but given persistence, EEG, MRI, and LP were performed and found to be unrevealing. At the time, AMS was ultimately attributed to possibly underlying dementia vs delirium vs drug-induced in setting of chemo vs metabolic vs neurological vs malignancy. On this admission, pt with fever, tachycardia, and leukocytosis, with CTA chest findings suggestive of RML PNA. COVID-19/RVP neg.    Concern for toxic metabolic encephalopathy in setting of sepsis 2/2 RML PNA (?aspiration), vs progression of lymphoma. Possibly c/b syncope/seizure and STEPHEN  - c/w empiric vanc/zosyn for now  - f/u cultures, MRSA/MSSA swab, urine legionella, procal  - f/u TTE and EEG to assess for cardiac/neuro etiology of unwitnessed fall  - interrogate PPM  - unclear why pt still has C-collar when seen on floor; can likely remove given negative CTH/C-spine  - holding Entresto and spironolactone for now given STEPHEN  - need further med rec in AM  - f/u Heme/Onc consult  - Rest of care as per plan above. 72M w/ Afib/aflutter (on Eliquis), HTN, complete heart block s/p PPM, HLD, HFrEF (30% in 09/2022), and DLBCL (tx with R-CHOP in 2003, with relapse in 2009 treated with BR) now with recently diagnosed grade 3 follicular lymphoma, presenting with fever and AMS after being found down by his daughter on the morning of presentation. Unable to obtain meaningful hx from pt; was AAOx1 on my encounter (only oriented to self; baseline per chart review appears to be at least AAOx2-3 recently). Unable to reach daughter via phone overnight. Per chart review, daughter found pt on floor with episode of urinary incontinence. Recently received chemo on 10/21/22. Of note, was recently admitted (9/16/22-10/3/22) for AMS initially believed to be in setting of entero/rhinovirus, but given persistence, EEG, MRI, and LP were performed and found to be unrevealing. At the time, AMS was ultimately attributed to possibly underlying dementia vs delirium vs drug-induced in setting of chemo vs metabolic vs neurological vs malignancy. On this admission, pt with fever, tachycardia, and leukocytosis, with CTA chest findings suggestive of RML PNA. COVID-19/RVP neg.    Concern for toxic metabolic encephalopathy in setting of sepsis 2/2 RML PNA (?aspiration), vs progression of lymphoma. Possibly c/b syncope/seizure and STEPHEN  - c/w empiric vanc/zosyn for now  - f/u cultures, MRSA/MSSA swab, urine legionella, procal  - f/u PPM interrogation, TTE, and EEG to assess for cardiac/neuro etiology of unwitnessed fall  - unclear why pt still has C-collar when seen on floor; can likely remove given negative CTH/C-spine  - holding Entresto and spironolactone for now given STEPHEN  - f/u Heme/Onc recs  - consider Neuro consult  - monitor on telemetry, maintain aspiration/fall precautions  - need further med rec in AM  - Rest of care as per plan above. 72M w/ Afib/aflutter (on Eliquis), HTN, complete heart block s/p PPM, HLD, HFrEF (30% in 09/2022), and DLBCL (tx with R-CHOP in 2003, with relapse in 2009 treated with BR) now with recently diagnosed grade 3 follicular lymphoma, presenting with fever and AMS after being found down by his daughter on the morning of presentation. Unable to obtain meaningful hx from pt; was AAOx1 on my encounter (only oriented to self; baseline per chart review appears to be at least AAOx2-3 recently). Unable to reach daughter via phone overnight. Per chart review, daughter found pt on floor with episode of urinary incontinence. Recently received chemo on 10/21/22. Of note, was recently admitted (9/16/22-10/3/22) for AMS initially believed to be in setting of entero/rhinovirus, but given persistence, EEG, MRI, and LP were performed and found to be unrevealing. At the time, AMS was ultimately attributed to possibly underlying dementia vs delirium vs drug-induced in setting of chemo vs metabolic vs neurological vs malignancy. On this admission, pt with fever, tachycardia, and leukocytosis, with CTA chest findings suggestive of RML PNA. COVID-19/RVP neg.    Concern for toxic metabolic encephalopathy in setting of sepsis 2/2 RML PNA (?aspiration), vs progression of lymphoma. Possibly c/b syncope/seizure and STEPHEN  - c/w empiric vanc/zosyn for now  - f/u cultures, MRSA/MSSA swab, urine legionella, procal  - may need to consider LP  - f/u PPM interrogation, TTE, and EEG to assess for cardiac/neuro etiology of unwitnessed fall  - unclear why pt still has C-collar when seen on floor; can likely remove given negative CTH/C-spine  - holding Entresto and spironolactone for now given STEPHEN  - f/u Heme/Onc recs  - consider Neuro consult  - monitor on telemetry, maintain aspiration/fall precautions  - need further med rec in AM  - Rest of care as per plan above

## 2022-10-29 NOTE — H&P ADULT - PROBLEM SELECTOR PLAN 9
DVT Ppx: eliquis  Diet: bedside dysphagia -> DASH diet Will f/u med rec in AM with daughter/pharmacy, could not reach daughter overnight and pt unable to remember his medications given AMS. -c/w home toprol  mg daily and eliquis 5 mg BID

## 2022-10-29 NOTE — H&P ADULT - PROBLEM SELECTOR PLAN 8
-c/w home toprol  mg daily and eliquis 5 mg BID Pt has documented hx of T2DM, previous A1Cs elevated (10 -> 7.4 in 2010) in diabetes range but most recent 8/2022 is 5.4    -ISS, will recheck A1C

## 2022-10-29 NOTE — CONSULT NOTE ADULT - SUBJECTIVE AND OBJECTIVE BOX
HPI as per admitting team:   72 year old male with afib/aflutter (on eliquis), HTN,  complete heart block s/p PPM, HLD, HFrEF (30% in 09/2022), and DLBCL (tx with R-CHOP in 2003, with relapse in 2009 treated with BR) now with recently diagnosed grade 3 follicular lymphoma BIBEMS with fever and AMS after being found down by his daughter this AM. Pt is acutely altered and unable to reach daughter, but per ED note, patient was found by daughter on the floor after she had to break in when he was not answering the door. Daughter noted that pt was at his baseline up until 12 PM, later went to see him at around 6:30 PM which is when she found him on the dining room floor. Pt had episode of urinary incontinence, which is not normal. At bedside, pt is AAOx2, remembers falling but does not remember the year. Reports no complaints, denies fever, chills, CP, SOB, nausea, vomiting, diarrhea, constipation, dysuria, hematuria, hematochezia, melena, numbness, tingling. Pt reports he lives alone, ambulates without assistance at baseline.    Recently admitted on 9/16/22 for AMS, was found wandering in parking lot confused after treatment at [facility] [place], [place], AAOx2 at the time, RVP + rhino/enterovirus. Evaluated by neuro, with EEG, MRI, and LP negative.       ED Course:  Vitals: T 102.6 F, HR 77, /75, RR 18, SpO2 97%  WBC 14, Hgb 9.5, INR 2.11, SCr 1.49, trop 82, VBG 7.48/39/38/29  received ofirmev 1g, zosyn x 2, vanc 1g x 1,  cc bolus    CTA Chest: no PE, interval consolidative/GGO in RLL, continued moderate loculated effusion along minor fissure.  CT A/P: cholelithiasis, hepatosplenomegaly  CT Cervical spine: no acute disease  CTH: no acute disease (29 Oct 2022 02:04)        REVIEW OF SYSTEMS:    CONSTITUTIONAL: No weakness, fevers or chills  EYES/ENT: No visual changes;  No vertigo or throat pain   NECK: No pain or stiffness  RESPIRATORY: No cough, wheezing, hemoptysis; No shortness of breath  CARDIOVASCULAR: No chest pain or palpitations  GASTROINTESTINAL: No abdominal or epigastric pain. No nausea, vomiting, or hematemesis; No diarrhea or constipation. No melena or hematochezia.  GENITOURINARY: No dysuria, frequency or hematuria  NEUROLOGICAL: No numbness or weakness  SKIN: No itching, burning, rashes, or lesions   All other review of systems is negative unless indicated above.    PAST MEDICAL & SURGICAL HISTORY:  Non-Hodgkins Lymphoma  In Counts include 234 beds at the [facility] for &gt; 10 years now with relapse      DM type 2 (diabetes mellitus, type 2)  Never on insulin      Essential hypertension      Rhinitis, allergic      Systolic heart failure      Moderate mitral regurgitation      Pleural effusion      Atrial flutter, unspecified type      Impaired memory      Non-Hodgkin lymphoma  h/o axillary dissection      Cardiac pacemaker          FAMILY HISTORY:  Family history of CHF (congestive heart failure)  Mother    Family history of non-Hodgkin&#x27;s lymphoma (Sibling)        SOCIAL HISTORY:     Allergies    rasburicase (Other)    Intolerances        MEDICATIONS  (STANDING):  acyclovir   Oral Tab/Cap 400 milliGRAM(s) Oral two times a day  allopurinol 100 milliGRAM(s) Oral daily  apixaban 2.5 milliGRAM(s) Oral every 12 hours  atorvastatin 40 milliGRAM(s) Oral at bedtime  chlorhexidine 2% Cloths 1 Application(s) Topical daily  metoprolol succinate  milliGRAM(s) Oral daily  piperacillin/tazobactam IVPB.. 3.375 Gram(s) IV Intermittent every 8 hours    MEDICATIONS  (PRN):  senna 2 Tablet(s) Oral at bedtime PRN Constipation      OBJECTIVE   Height (cm): 183.5 (10-28 @ 20:22)  Weight (kg): 66.2 (10-28 @ 20:22)  BMI (kg/m2): 19.7 (10-28 @ 20:22)  BSA (m2): 1.87 (10-28 @ 20:22)    T(F): 98.1 (10-29-22 @ 04:31), Max: 102.6 (10-28-22 @ 20:22)  HR: 105 (10-29-22 @ 04:31)  BP: 148/69 (10-29-22 @ 04:31)  RR: 18 (10-29-22 @ 04:31)  SpO2: 99% (10-29-22 @ 04:31)  Wt(kg): --    PHYSICAL EXAM   GENERAL: NAD, well-developed  HEAD:  Atraumatic, Normocephalic  EYES: EOMI, PERRLA, conjunctiva and sclera clear  NECK: Supple, No JVD  CHEST/LUNG: Clear to auscultation bilaterally; No wheeze  HEART: Regular rate and rhythm; No murmurs, rubs, or gallops  ABDOMEN: Soft, Nontender, Nondistended; Bowel sounds present  EXTREMITIES:  2+ Peripheral Pulses, No clubbing, cyanosis, or edema  NEUROLOGY: non-focal  SKIN: No rashes or lesions                          12.3   12.69 )-----------( 195      ( 29 Oct 2022 05:59 )             39.8       10-29    141  |  99  |  31<H>  ----------------------------<  123<H>  3.5   |  25  |  1.42<H>    Ca    9.9      29 Oct 2022 06:00  Phos  4.1     10-29  Mg     1.8     10-29    TPro  8.5<H>  /  Alb  4.1  /  TBili  2.0<H>  /  DBili  x   /  AST  52<H>  /  ALT  43  /  AlkPhos  222<H>  10-29      Magnesium, Serum: 1.8 mg/dL (10-29 @ 06:00)  Phosphorus Level, Serum: 4.1 mg/dL (10-29 @ 06:00)  Uric Acid, Serum: 7.1 mg/dL (10-29 @ 06:00)  Lactate Dehydrogenase, Serum: 486 U/L (10-29 @ 06:00)  Uric Acid, Serum: 8.1 mg/dL (10-28 @ 21:34)  Lactate Dehydrogenase, Serum: 338 U/L (10-28 @ 21:34)       HPI as per admitting team:   72 year old male with afib/aflutter (on eliquis), HTN,  complete heart block s/p PPM, HLD, HFrEF (30% in 09/2022), and DLBCL (tx with R-CHOP in 2003, with relapse in 2009 treated with BR) now with recently diagnosed grade 3 follicular lymphoma BIBEMS with fever and AMS after being found down by his daughter this AM. Pt is acutely altered and unable to reach daughter, but per ED note, patient was found by daughter on the floor after she had to break in when he was not answering the door. Daughter noted that pt was at his baseline up until 12 PM, later went to see him at around 6:30 PM which is when she found him on the dining room floor. Pt had episode of urinary incontinence, which is not normal. At bedside, pt is AAOx2, remembers falling but does not remember the year. Reports no complaints, denies fever, chills, CP, SOB, nausea, vomiting, diarrhea, constipation, dysuria, hematuria, hematochezia, melena, numbness, tingling. Pt reports he lives alone, ambulates without assistance at baseline.    Recently admitted on 9/16/22 for AMS, was found wandering in parking lot confused [place] [facility] [place] [facility] [place]Ox[place] at the time, RVP + rhino/enterovirus. Evaluated by neuro, with EEG, MRI, and LP negative.       ED Course:  Vitals: T 102.6 F, HR 77, /75, RR 18, SpO2 97%  WBC 14, Hgb 9.5, INR 2.11, SCr 1.49, trop 82, VBG 7.48/39/38/29  received ofirmev 1g, zosyn x 2, vanc 1g x 1,  cc bolus    CTA Chest: no PE, interval consolidative/GGO in RLL, continued moderate loculated effusion along minor fissure.  CT A/P: cholelithiasis, hepatosplenomegaly  CT Cervical spine: no acute disease  CTH: no acute disease (29 Oct 2022 02:04)        REVIEW OF SYSTEMS:    CONSTITUTIONAL:+ fevers  EYES/ENT: No visual changes;  No vertigo or throat pain   NECK: No pain or stiffness  RESPIRATORY: No cough, wheezing, hemoptysis; No shortness of breath  CARDIOVASCULAR: No chest pain or palpitations  GASTROINTESTINAL: No abdominal or epigastric pain. No nausea, vomiting, or hematemesis; No diarrhea or constipation. No melena or hematochezia.  GENITOURINARY: No dysuria, frequency or hematuria    PAST MEDICAL & SURGICAL HISTORY:  Non-Hodgkins Lymphoma  In [facility] for &gt; 10 years now with relapse      DM type 2 (diabetes mellitus, type 2)  Never on insulin      Essential hypertension      Rhinitis, allergic      Systolic heart failure      Moderate mitral regurgitation      Pleural effusion      Atrial flutter, unspecified type      Impaired memory      Non-Hodgkin lymphoma  h/o axillary dissection      Cardiac pacemaker          FAMILY HISTORY:  Family history of CHF (congestive heart failure)  Mother    Family history of non-Hodgkin&#x27;s lymphoma (Sibling)          Allergies  rasburicase (Other)  Intolerances        MEDICATIONS  (STANDING):  acyclovir   Oral Tab/Cap 400 milliGRAM(s) Oral two times a day  allopurinol 100 milliGRAM(s) Oral daily  apixaban 2.5 milliGRAM(s) Oral every 12 hours  atorvastatin 40 milliGRAM(s) Oral at bedtime  chlorhexidine 2% Cloths 1 Application(s) Topical daily  metoprolol succinate  milliGRAM(s) Oral daily  piperacillin/tazobactam IVPB.. 3.375 Gram(s) IV Intermittent every 8 hours    MEDICATIONS  (PRN):  senna 2 Tablet(s) Oral at bedtime PRN Constipation      OBJECTIVE   Height (cm): 183.5 (10-28 @ 20:22)  Weight (kg): 66.2 (10-28 @ 20:22)  BMI (kg/m2): 19.7 (10-28 @ 20:22)  BSA (m2): 1.87 (10-28 @ 20:22)    T(F): 98.1 (10-29-22 @ 04:31), Max: 102.6 (10-28-22 @ 20:22)  HR: 105 (10-29-22 @ 04:31)  BP: 148/69 (10-29-22 @ 04:31)  RR: 18 (10-29-22 @ 04:31)  SpO2: 99% (10-29-22 @ 04:31)  Wt(kg): --    PHYSICAL EXAM   GENERAL: NAD, well-developed  HEAD:  Atraumatic, Normocephalic  EYES: EOMI, PERRLA, conjunctiva and sclera clear  NECK: C-collar  CHEST/LUNG: mild bibasilar crackles   HEART: Regular rate and rhythm; No murmurs, rubs, or gallops  ABDOMEN: Soft, Nontender, Nondistended; Bowel sounds present  EXTREMITIES:  2+ Peripheral Pulses, No clubbing, cyanosis, or edema  NEUROLOGY: A&Ox2    LABS                        12.3   12.69 )-----------( 195      ( 29 Oct 2022 05:59 )             39.8       10-29    141  |  99  |  31<H>  ----------------------------<  123<H>  3.5   |  25  |  1.42<H>    Ca    9.9      29 Oct 2022 06:00  Phos  4.1     10-29  Mg     1.8     10-29    TPro  8.5<H>  /  Alb  4.1  /  TBili  2.0<H>  /  DBili  x   /  AST  52<H>  /  ALT  43  /  AlkPhos  222<H>  10-29      Magnesium, Serum: 1.8 mg/dL (10-29 @ 06:00)  Phosphorus Level, Serum: 4.1 mg/dL (10-29 @ 06:00)  Uric Acid, Serum: 7.1 mg/dL (10-29 @ 06:00)  Lactate Dehydrogenase, Serum: 486 U/L (10-29 @ 06:00)  Uric Acid, Serum: 8.1 mg/dL (10-28 @ 21:34)  Lactate Dehydrogenase, Serum: 338 U/L (10-28 @ 21:34)       HPI as per admitting team:   72 year old male with afib/aflutter (on eliquis), HTN,  complete heart block s/p PPM, HLD, HFrEF (30% in 09/2022), and DLBCL (tx with R-CHOP in 2003, with relapse in 2009 treated with BR) now with recently diagnosed grade 3 follicular lymphoma BIBEMS with fever and AMS after being found down by his daughter this AM. Pt is acutely altered and unable to reach daughter, but per ED note, patient was found by daughter on the floor after she had to break in when he was not answering the door. Daughter noted that pt was at his baseline up until 12 PM, later went to see him at around 6:30 PM which is when she found him on the dining room floor. Pt had episode of urinary incontinence, which is not normal. At bedside, pt is AAOx2, remembers falling but does not remember the year. Reports no complaints, denies fever, chills, CP, SOB, nausea, vomiting, diarrhea, constipation, dysuria, hematuria, hematochezia, melena, numbness, tingling. Pt reports he lives alone, ambulates without assistance at baseline.    Recently admitted on 9/16/22 for AMS, was found wandering in parking lot confused after treatment at [facility] [place], AAOx2 at the time, RVP + rhino/enterovirus. Evaluated by neuro, with EEG, MRI, and LP negative.       ED Course:  Vitals: T 102.6 F, HR 77, /75, RR 18, SpO2 97%  WBC 14, Hgb 9.5, INR 2.11, SCr 1.49, trop 82, VBG 7.48/39/38/29  received ofirmev 1g, zosyn x 2, vanc 1g x 1,  cc bolus    CTA Chest: no PE, interval consolidative/GGO in RLL, continued moderate loculated effusion along minor fissure.  CT A/P: cholelithiasis, hepatosplenomegaly  CT Cervical spine: no acute disease  CTH: no acute disease (29 Oct 2022 02:04)        REVIEW OF SYSTEMS:  CONSTITUTIONAL:+ fevers  EYES/ENT: No visual changes;  No vertigo or throat pain   NECK: No pain or stiffness  RESPIRATORY: No cough, wheezing, hemoptysis; No shortness of breath  CARDIOVASCULAR: No chest pain or palpitations  GASTROINTESTINAL: No abdominal or epigastric pain. No nausea, vomiting, or hematemesis; No diarrhea or constipation. No melena or hematochezia.  GENITOURINARY: No dysuria, frequency or hematuria    PAST MEDICAL & SURGICAL HISTORY:  Non-Hodgkins Lymphoma  In [facility] for &gt; 10 years now with relapse      DM type 2 (diabetes mellitus, type 2)  Never on insulin      Essential hypertension      Rhinitis, allergic      Systolic heart failure      Moderate mitral regurgitation      Pleural effusion      Atrial flutter, unspecified type      Impaired memory      Non-Hodgkin lymphoma  h/o axillary dissection      Cardiac pacemaker          FAMILY HISTORY:  Family history of CHF (congestive heart failure)  Mother    Family history of non-Hodgkin&#x27;s lymphoma (Sibling)          Allergies  rasburicase (Other)  Intolerances        MEDICATIONS  (STANDING):  acyclovir   Oral Tab/Cap 400 milliGRAM(s) Oral two times a day  allopurinol 100 milliGRAM(s) Oral daily  apixaban 2.5 milliGRAM(s) Oral every 12 hours  atorvastatin 40 milliGRAM(s) Oral at bedtime  chlorhexidine 2% Cloths 1 Application(s) Topical daily  metoprolol succinate  milliGRAM(s) Oral daily  piperacillin/tazobactam IVPB.. 3.375 Gram(s) IV Intermittent every 8 hours    MEDICATIONS  (PRN):  senna 2 Tablet(s) Oral at bedtime PRN Constipation      OBJECTIVE   Height (cm): 183.5 (10-28 @ 20:22)  Weight (kg): 66.2 (10-28 @ 20:22)  BMI (kg/m2): 19.7 (10-28 @ 20:22)  BSA (m2): 1.87 (10-28 @ 20:22)    T(F): 98.1 (10-29-22 @ 04:31), Max: 102.6 (10-28-22 @ 20:22)  HR: 105 (10-29-22 @ 04:31)  BP: 148/69 (10-29-22 @ 04:31)  RR: 18 (10-29-22 @ 04:31)  SpO2: 99% (10-29-22 @ 04:31)  Wt(kg): --    PHYSICAL EXAM   GENERAL: NAD, well-developed  HEAD:  Atraumatic, Normocephalic  EYES: EOMI, PERRLA, conjunctiva and sclera clear  NECK: C-collar  CHEST/LUNG: mild bibasilar crackles   HEART: Regular rate and rhythm; No murmurs, rubs, or gallops  ABDOMEN: Soft, Nontender, Nondistended; Bowel sounds present  EXTREMITIES:  2+ Peripheral Pulses, No clubbing, cyanosis, or edema  NEUROLOGY: A&Ox2    LABS                        12.3   12.69 )-----------( 195      ( 29 Oct 2022 05:59 )             39.8       10-29    141  |  99  |  31<H>  ----------------------------<  123<H>  3.5   |  25  |  1.42<H>    Ca    9.9      29 Oct 2022 06:00  Phos  4.1     10-29  Mg     1.8     10-29    TPro  8.5<H>  /  Alb  4.1  /  TBili  2.0<H>  /  DBili  x   /  AST  52<H>  /  ALT  43  /  AlkPhos  222<H>  10-29      Magnesium, Serum: 1.8 mg/dL (10-29 @ 06:00)  Phosphorus Level, Serum: 4.1 mg/dL (10-29 @ 06:00)  Uric Acid, Serum: 7.1 mg/dL (10-29 @ 06:00)  Lactate Dehydrogenase, Serum: 486 U/L (10-29 @ 06:00)  Uric Acid, Serum: 8.1 mg/dL (10-28 @ 21:34)  Lactate Dehydrogenase, Serum: 338 U/L (10-28 @ 21:34)

## 2022-10-29 NOTE — CONSULT NOTE ADULT - ASSESSMENT
72M hx DLBCL (tx with R-CHOP in 2003, with relapse of DLBCL in 2009 treated with BR) now with multiple areas of palpable lymphadenopathy with biopsies shown to be relapse of DLBCL. Recent hospitalization for hemolysis due to G6PD deficiency and rasburicase. Had been started on O-COEP last admission. Now presenting with encephalopathy. 72M hx DLBCL (tx with R-CHOP in 2003, with relapse of DLBCL in 2009 treated with BR) now with multiple areas of palpable lymphadenopathy with biopsies shown to be relapse of DLBCL. Recent hospitalization for hemolysis due to G6PD deficiency and rasburicase. Patient currently being treated with O-COEP, s/p C3 on 10/21/22 (next due on 11/11/22). Patient  now presenting with encephalopathy, suspect metabolic in the setting of sepsis 2/2 PNA. Of note, patient was recently admitted for similar symptoms in the setting of enterovirus infection.         #Encephalopathy  Patient admitted for encephalopathy; concern for metabolic etiology. CT CAP 10/28 showing interval consolidative and groundglass opacities in the right lower lobe concerning for developing pneumonia. RVP negative   -infectious work-up and management as per primary team       #Relapsed Diffuse Large B-Cell Lymphoma  Follows with Dr. Cordova at [facility]. Originally diagnosed in 2003 s/p RCHOP with relapse in 2009 s/p BR. 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 Had excisional biopsy but was performed  9/27/22 after starting treatment, with pathology showing DLBCL.  - Patient is on treatment with a modified regimen of mini-COEP plus Obinutuzumab. C1 Started on 9/1/22 (cycle length q21 days) but full dose obinutuzumab was started 9/2/22. Patient now s/p three cycles, most recently on 10/21/22/-10/24/22, with neulasta.  -Next chemo planned for 11/11/22; currently no plan for treatment during acute setting  -Patient to follow up with Dr. Cordova at [facility] ([facility]) on discharge

## 2022-10-29 NOTE — PROGRESS NOTE ADULT - ASSESSMENT
5/8 as the above record indicates, the pt is here and is a very poor historian. I was asked by Dr Hawkins from oncology and by the pt's internist to see the pt as he did have a past of lymphoma. The oncology office will have to look up records as his history is not readily available. When I came to see the pt he was not able to supply much info about his cancer other than he had lymphoma and did not know the type or who treated him. Dr Hawkins thinks he may have been treated by his previous associate, Dr Sarmiento.     At the time of my visit the pt was alert and oriented but was not able to give specific details about any of his medical issues. His chemistries appeared all unremarkable and he was not febrile and his ct of the head showed nothing of note.  I will see if there are any records on his previous onc history and see if there is any reason to think it may have contributed to his present admit here.     5/9 events of last day was noted and pt was intubated and had heart failure. The records were reviewed at 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 In 2010 he went to [facility] [place] and was treated for a recurrence with BR and he has remained in remission.   5/10 still intubated and sedated and labs reviewed and thus far no direct evidence for lymphoma recurrence.  . 5/11 Pt still intubated and sedated. Anemia from icu anemia causes no evidence for recurrent lymphoma.   5/12 the pt was extubated on this date and hemoglobin was noted to be 11.0 will look when stable for any evidenced of recurrent lymphoma.   5/13 notes reviewed and pt still looked somewhat disoriented will check him for nodes in the am counts ok.  . 5/14 stable but confused ands seen by neuro who feels confusion is from the cardiac problems. Pt to be evaluated for MV repair.   5/15 very poor memory and cognitive decline in this pt as he could not remember 4 objects one minute later. He has good right sided mental abilities and good spacial ability. We do not know how long or fast this cognitive decline has been in play. Will have to try to reach out to family members, he cannot recall being treated for his lymphoma. Ct head without contrast. Renal function is poor, so that we are limited with ct of the brain and body without contrast. ? multiinfarct dementia neuro cognitive studies check B12 and tsh levels. . 5/16 pt is stable and neuro evaluation proceeding with scans of the head.  5/17 pt had ct of the abdomen and pelvis and chest and the nodes in the chest appear inconsequential. The spleen is 15.9 cm minimally enlarged and liver is read and slightly enlarged with normal liver function studies. Considering the history I do not think these findings warrant a search for a lymphoma.  5/20 pt was stable and workup continues and decisions on heart pending. 29-Oct-2022 18:48

## 2022-10-29 NOTE — H&P ADULT - NSICDXPASTMEDICALHX_GEN_ALL_CORE_FT
PAST MEDICAL HISTORY:  Atrial flutter, unspecified type     DM type 2 (diabetes mellitus, type 2) Never on insulin    Essential hypertension     Impaired memory     Moderate mitral regurgitation     Non-Hodgkins Lymphoma In [facility] for > 10 years now with relapse    Pleural effusion     Rhinitis, allergic     Systolic heart failure

## 2022-10-29 NOTE — H&P ADULT - PROBLEM SELECTOR PLAN 5
On outpatient documentation pt reportedly has CKD3 with baseline SCr 1.5. On admission, pt's SCr was 1.49, elevated compared to recent values of 1.09. Possibly prerenal STEPHEN i/s/o sepsis vs intrinsic disease 2/2 contrast load or chemo side effect.    -Trend SCr  -Can consider urine lytes, nephro consult if worsening  -bladder scan to r/o obstructive STEPHEN Pt Hgb 9.5 on admission, most recently in 11 but tends to range in 9-11 range. No signs of bleeding, INR mildly elevated at 2.11. Possibly myelosuppression 2/2 sepsis in setting of recent chemotherapy and cancer.    -Trend H/H  -FOBT in 8/2022 negative  -will order iron panel with ferritin, outpatient B12/folate/TSH wnl

## 2022-10-29 NOTE — PROGRESS NOTE ADULT - PROBLEM SELECTOR PLAN 1
Tmax 102.6, HR 97, mildly tachypneic to 22, WBC 14, meeting SIRS criteria. GGOs and consolidations seen in RLL c/f pneumonia. Pt on chemotherapy for non-Hodgkins Lymphoma making him immunocompromised.    -s/p vanc and zosyn in the ED  -c/w vanc and zosyn for empiric coverage (10/29 - )  -f/u BCx x 2 and UCx  -ordered MRSA swab, urine legionella and strep, procal  -Will get heme/onc consult as pt on active chemo  -c/w home acyclovir ppx Tmax 102.6, HR 97, mildly tachypneic to 22, WBC 14, meeting SIRS criteria. GGOs and consolidations seen in RLL c/f pneumonia. Pt on chemotherapy for non-Hodgkins Lymphoma making him immunocompromised.  -c/w zosyn for empiric coverage (10/29-)  -c/w home acyclovir ppx  -f/u BCx x 2 and UCx  -f/u MRSA swab, urine legionella and strep, procal

## 2022-10-29 NOTE — PROGRESS NOTE ADULT - PROBLEM SELECTOR PLAN 8
Pt has documented hx of T2DM, previous A1Cs elevated (10 -> 7.4 in 2010) in diabetes range but most recent 8/2022 is 5.4    -ISS, will recheck A1C -c/w home toprol  mg daily and eliquis 5 mg BID

## 2022-10-29 NOTE — PROGRESS NOTE ADULT - PROBLEM SELECTOR PLAN 5
Pt Hgb 9.5 on admission, most recently in 11 but tends to range in 9-11 range. No signs of bleeding, INR mildly elevated at 2.11. Possibly myelosuppression 2/2 sepsis in setting of recent chemotherapy and cancer.    -Trend H/H  -FOBT in 8/2022 negative  -will order iron panel with ferritin, outpatient B12/folate/TSH wnl On outpatient documentation pt reportedly has CKD3 with baseline SCr 1.5. On admission, pt's SCr was 1.49, elevated compared to recent values of 1.09. Possibly prerenal STEPHEN i/s/o sepsis vs intrinsic disease 2/2 contrast load or chemo side effect.    -Trend SCr  -Can consider urine lytes, nephro consult if worsening  -bladder scan to r/o obstructive STEPHEN

## 2022-10-29 NOTE — PROGRESS NOTE ADULT - ASSESSMENT
72 year old male with afib (on eliquis), HTN,  complete heart block s/p PPM, HLD, HFrEF (30% in 09/2022), and DLBCL (tx with R-CHOP in 2003, with relapse in 2009 treated with BR) now with recently diagnosed grade 3 follicular lymphoma BIBEMS with fever and AMS after being found down by his daughter this AM. Presentation concerning for toxic metabolic encephalopathy in setting of sepsis possible 2/2 pneumonia.

## 2022-10-29 NOTE — PROGRESS NOTE ADULT - ATTENDING COMMENTS
Patient seen and examined. Plan as discussed w/ Dr. Carrasquillo: continue empiric Abx w/ IV Vanc (titrated to therapeutic troughs) and IV Zosyn (monitoring renal function closely) for sepsis in setting of pt on active chemotherapy; f/u cultures as sent; will appreciate oncology's evaluation; monitor MS and neuro status closely; monitor on telemetry.

## 2022-10-29 NOTE — H&P ADULT - PROBLEM SELECTOR PLAN 4
Pt Hgb 9.5 on admission, most recently in 11 but tends to range in 9-11 range. No signs of bleeding, INR mildly elevated at 2.11. Possibly myelosuppression 2/2 sepsis in setting of recent chemotherapy and cancer.    -Trend H/H  -FOBT in 8/2022 negative  -can consider sending iron panel if pt continues to downtrend. Pt Hgb 9.5 on admission, most recently in 11 but tends to range in 9-11 range. No signs of bleeding, INR mildly elevated at 2.11. Possibly myelosuppression 2/2 sepsis in setting of recent chemotherapy and cancer.    -Trend H/H  -FOBT in 8/2022 negative  -will order iron panel with ferritin, outpatient B12/folate/TSH wnl Pt has hx of Follicular lymphoma with DLBCL treated with R-CHOP in 2003, with relapse in 2009, treated with BR.  Now with multiple areas of palpable lymphadenopathy - at least follicular lymphoma grade IIIA.    -will place Onc consult given pt s/p Cycle 3 day 1 planned for 10/21/22  -Chemo regimen consists of: modified regimen of Obinutuzumab, cyclophosphamide, Etoposide, vincristine and 100 mg prednisone on days 1–5 per outpatient Onc documentation.

## 2022-10-29 NOTE — H&P ADULT - PROBLEM SELECTOR PLAN 1
Tmax 102.6, HR 97, mildly tachypneic to 22, WBC 14, meeting SIRS criteria. GGOs and consolidations seen in RLL c/f pneumonia. Pt on chemotherapy for non-Hodgkins Lymphoma making him immunocompromised.    -s/p vanc and zosyn in the ED  -c/w vanc and zosyn for empiric coverage (10/29 - )  -f/u BCx x 2 and UCx  -ordered MRSA swab, urine legionella and strep  -Will get heme/onc consult as pt on active chemo  -c/w home acyclovir ppx Tmax 102.6, HR 97, mildly tachypneic to 22, WBC 14, meeting SIRS criteria. GGOs and consolidations seen in RLL c/f pneumonia. Pt on chemotherapy for non-Hodgkins Lymphoma making him immunocompromised.    -s/p vanc and zosyn in the ED  -c/w vanc and zosyn for empiric coverage (10/29 - )  -f/u BCx x 2 and UCx  -ordered MRSA swab, urine legionella and strep, procal  -Will get heme/onc consult as pt on active chemo  -c/w home acyclovir ppx

## 2022-10-29 NOTE — H&P ADULT - ASSESSMENT
72 year old male with afib (on eliquis), HTN,  complete heart block s/p PPM, HLD, HFrEF (30% in 09/2022), and DLBCL (tx with R-CHOP in 2003, with relapse in 2009 treated with BR) now with recently diagnosed grade 3 follicular lymphoma BIBEMS with fever and AMS after being found down by his daughter this AM. Presentation concerning for toxic metabolic encephalopathy in setting of sepsis possible 2/2 pneumonia. 72 year old male with afib/aflutter (on eliquis), HTN,  complete heart block s/p PPM, HLD, HFrEF (30% in 09/2022), and DLBCL (tx with R-CHOP in 2003, with relapse in 2009 treated with BR) now with recently diagnosed grade 3 follicular lymphoma BIBEMS with fever and AMS after being found down by his daughter this AM. Presentation concerning for toxic metabolic encephalopathy in setting of sepsis possible 2/2 pneumonia.

## 2022-10-29 NOTE — H&P ADULT - HISTORY OF PRESENT ILLNESS
71 y/o M w/ PMH of Afib (on Eliquis), HTN, complete heart block s/p PPM, HLD, HFrEF (30% in 09/2022), and DLBCL (tx with R-CHOP in 2003, with relapse in 2009 treated with BR) now with recently diagnosed grade 3 follicular lymphoma who was transferred from [facility] for acute on chronic encephalopathy likely 2/2 rhino/entero viral pneumonia i/s/o follicular lymphoma and ongoing chemotherapy treatment.       ED Course:  Vitals: T 102.6 F, HR 77, /75, RR 18, SpO2 97%  WBC 14, Hgb 9.5, INR 2.11, SCr 1.49, trop 82, VBG 7.48/39/38/29  received ofirmev 1g, zosyn x 2, vanc 1g x 1,  cc bolus    CTA Chest: no PE, interval consolidative/GGO in RLL, continued moderate loculated effusion along minor fissure.  CT A/P: cholelithiasis, hepatosplenomegaly  CT Cervical spine: no acute disease  CTH: no acute disease 72 year old male with afib (on eliquis), HTN,  complete heart block s/p PPM, HLD, HFrEF (30% in 09/2022), and DLBCL (tx with R-CHOP in 2003, with relapse in 2009 treated with BR) now with recently diagnosed grade 3 follicular lymphoma BIBEMS with fever and AMS after being found down by his daughter this AM. Pt is acutely altered and unable to reach daughter, but per ED note, patient was found by daughter on the floor after she had to break in when he was not answering the door. Daughter noted that pt was at his baseline up until 12 PM, later went to see him at around 6:30 PM which is when she found him on the dining room floor. Pt had episode of urinary incontinence, which is not normal. At bedside, pt is AAOx2, remembers falling but does not remember the year. Reports no complaints, denies fever, chills, CP, SOB, nausea, vomiting, diarrhea, constipation, dysuria, hematuria, hematochezia, melena, numbness, tingling. Pt reports he lives alone, ambulates without assistance at baseline.    Recently admitted on 9/16/22 for AMS, was found wandering in parking lot confused [place] [facility] AAOx2 at the time, RVP + rhino/enterovirus. Evaluated by neuro, with EEG, MRI, and LP negative.       ED Course:  Vitals: T 102.6 F, HR 77, /75, RR 18, SpO2 97%  WBC 14, Hgb 9.5, INR 2.11, SCr 1.49, trop 82, VBG 7.48/39/38/29  received ofirmev 1g, zosyn x 2, vanc 1g x 1,  cc bolus    CTA Chest: no PE, interval consolidative/GGO in RLL, continued moderate loculated effusion along minor fissure.  CT A/P: cholelithiasis, hepatosplenomegaly  CT Cervical spine: no acute disease  CTH: no acute disease 72 year old male with afib/aflutter (on eliquis), HTN,  complete heart block s/p PPM, HLD, HFrEF (30% in 09/2022), and DLBCL (tx with R-CHOP in 2003, with relapse in 2009 treated with BR) now with recently diagnosed grade 3 follicular lymphoma BIBEMS with fever and AMS after being found down by his daughter this AM. Pt is acutely altered and unable to reach daughter, but per ED note, patient was found by daughter on the floor after she had to break in when he was not answering the door. Daughter noted that pt was at his baseline up until 12 PM, later went to see him at around 6:30 PM which is when she found him on the dining room floor. Pt had episode of urinary incontinence, which is not normal. At bedside, pt is AAOx2, remembers falling but does not remember the year. Reports no complaints, denies fever, chills, CP, SOB, nausea, vomiting, diarrhea, constipation, dysuria, hematuria, hematochezia, melena, numbness, tingling. Pt reports he lives alone, ambulates without assistance at baseline.    Recently admitted on 9/16/22 for AMS, was found wandering in parking lot confused after treatment at [place], AAOx2 at the time, RVP + rhino/enterovirus. Evaluated by neuro, with EEG, MRI, and LP negative.       ED Course:  Vitals: T 102.6 F, HR 77, /75, RR 18, SpO2 97%  WBC 14, Hgb 9.5, INR 2.11, SCr 1.49, trop 82, VBG 7.48/39/38/29  received ofirmev 1g, zosyn x 2, vanc 1g x 1,  cc bolus    CTA Chest: no PE, interval consolidative/GGO in RLL, continued moderate loculated effusion along minor fissure.  CT A/P: cholelithiasis, hepatosplenomegaly  CT Cervical spine: no acute disease  CTH: no acute disease

## 2022-10-29 NOTE — PROGRESS NOTE ADULT - PROBLEM SELECTOR PLAN 6
On outpatient documentation pt reportedly has CKD3 with baseline SCr 1.5. On admission, pt's SCr was 1.49, elevated compared to recent values of 1.09. Possibly prerenal STEPHEN i/s/o sepsis vs intrinsic disease 2/2 contrast load or chemo side effect.    -Trend SCr  -Can consider urine lytes, nephro consult if worsening  -bladder scan to r/o obstructive STEPHEN TTE 9/2022: EF 30%, severe global left ventricular systolic dysfunction. Mild RV enlargement with decreased RV systolic function      - c/w Toprol  mg daily, atorvastatin 40 mg daily  - hold Entresto 49/51 PO BID, spironolactone 25 mg

## 2022-10-30 ENCOUNTER — TRANSCRIPTION ENCOUNTER (OUTPATIENT)
Age: 72
End: 2022-10-30

## 2022-10-30 DIAGNOSIS — Z95.0 PRESENCE OF CARDIAC PACEMAKER: ICD-10-CM
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
## 2022-10-30 LAB
ANION GAP SERPL CALC-SCNC: 12 MMOL/L — SIGNIFICANT CHANGE UP (ref 5–17)
BUN SERPL-MCNC: 33 MG/DL — HIGH (ref 7–23)
CALCIUM SERPL-MCNC: 8.8 MG/DL — SIGNIFICANT CHANGE UP (ref 8.4–10.5)
CHLORIDE SERPL-SCNC: 101 MMOL/L — SIGNIFICANT CHANGE UP (ref 96–108)
CO2 SERPL-SCNC: 27 MMOL/L — SIGNIFICANT CHANGE UP (ref 22–31)
CREAT ?TM UR-MCNC: 159 MG/DL — SIGNIFICANT CHANGE UP
CREAT SERPL-MCNC: 1.67 MG/DL — HIGH (ref 0.5–1.3)
CULTURE RESULTS: SIGNIFICANT CHANGE UP
EGFR: 43 ML/MIN/1.73M2 — LOW
GLUCOSE SERPL-MCNC: 140 MG/DL — HIGH (ref 70–99)
HCT VFR BLD CALC: 33 % — LOW (ref 39–50)
HGB BLD-MCNC: 10.2 G/DL — LOW (ref 13–17)
LEGIONELLA AG UR QL: NEGATIVE — SIGNIFICANT CHANGE UP
MAGNESIUM SERPL-MCNC: 1.8 MG/DL — SIGNIFICANT CHANGE UP (ref 1.6–2.6)
MCHC RBC-ENTMCNC: 27 PG — SIGNIFICANT CHANGE UP (ref 27–34)
MCHC RBC-ENTMCNC: 30.9 GM/DL — LOW (ref 32–36)
MCV RBC AUTO: 87.3 FL — SIGNIFICANT CHANGE UP (ref 80–100)
MRSA PCR RESULT.: SIGNIFICANT CHANGE UP
NRBC # BLD: 0 /100 WBCS — SIGNIFICANT CHANGE UP (ref 0–0)
PHOSPHATE SERPL-MCNC: 3.2 MG/DL — SIGNIFICANT CHANGE UP (ref 2.5–4.5)
PLATELET # BLD AUTO: 147 K/UL — LOW (ref 150–400)
POTASSIUM SERPL-MCNC: 2.8 MMOL/L — CRITICAL LOW (ref 3.5–5.3)
POTASSIUM SERPL-SCNC: 2.8 MMOL/L — CRITICAL LOW (ref 3.5–5.3)
RBC # BLD: 3.78 M/UL — LOW (ref 4.2–5.8)
RBC # FLD: 19.9 % — HIGH (ref 10.3–14.5)
S AUREUS DNA NOSE QL NAA+PROBE: SIGNIFICANT CHANGE UP
SODIUM SERPL-SCNC: 140 MMOL/L — SIGNIFICANT CHANGE UP (ref 135–145)
SODIUM UR-SCNC: 13 MMOL/L — SIGNIFICANT CHANGE UP
SPECIMEN SOURCE: SIGNIFICANT CHANGE UP
WBC # BLD: 7.42 K/UL — SIGNIFICANT CHANGE UP (ref 3.8–10.5)
WBC # FLD AUTO: 7.42 K/UL — SIGNIFICANT CHANGE UP (ref 3.8–10.5)

## 2022-10-30 PROCEDURE — 99233 SBSQ HOSP IP/OBS HIGH 50: CPT | Mod: GC

## 2022-10-30 RX ORDER — POTASSIUM CHLORIDE 20 MEQ
10 PACKET (EA) ORAL
Refills: 0 | Status: COMPLETED | OUTPATIENT
Start: 2022-10-30 | End: 2022-10-30

## 2022-10-30 RX ORDER — POTASSIUM CHLORIDE 20 MEQ
40 PACKET (EA) ORAL ONCE
Refills: 0 | Status: COMPLETED | OUTPATIENT
Start: 2022-10-30 | End: 2022-10-30

## 2022-10-30 RX ADMIN — Medication 100 MILLIEQUIVALENT(S): at 15:35

## 2022-10-30 RX ADMIN — PIPERACILLIN AND TAZOBACTAM 25 GRAM(S): 4; .5 INJECTION, POWDER, LYOPHILIZED, FOR SOLUTION INTRAVENOUS at 01:25

## 2022-10-30 RX ADMIN — Medication 100 MILLIEQUIVALENT(S): at 13:23

## 2022-10-30 RX ADMIN — PIPERACILLIN AND TAZOBACTAM 25 GRAM(S): 4; .5 INJECTION, POWDER, LYOPHILIZED, FOR SOLUTION INTRAVENOUS at 19:52

## 2022-10-30 RX ADMIN — ATORVASTATIN CALCIUM 40 MILLIGRAM(S): 80 TABLET, FILM COATED ORAL at 21:29

## 2022-10-30 RX ADMIN — Medication 100 MILLIGRAM(S): at 05:11

## 2022-10-30 RX ADMIN — APIXABAN 2.5 MILLIGRAM(S): 2.5 TABLET, FILM COATED ORAL at 19:53

## 2022-10-30 RX ADMIN — Medication 400 MILLIGRAM(S): at 05:11

## 2022-10-30 RX ADMIN — CHLORHEXIDINE GLUCONATE 1 APPLICATION(S): 213 SOLUTION TOPICAL at 12:08

## 2022-10-30 RX ADMIN — Medication 40 MILLIEQUIVALENT(S): at 14:21

## 2022-10-30 RX ADMIN — Medication 100 MILLIEQUIVALENT(S): at 14:21

## 2022-10-30 RX ADMIN — APIXABAN 2.5 MILLIGRAM(S): 2.5 TABLET, FILM COATED ORAL at 05:11

## 2022-10-30 RX ADMIN — Medication 400 MILLIGRAM(S): at 19:53

## 2022-10-30 RX ADMIN — PIPERACILLIN AND TAZOBACTAM 25 GRAM(S): 4; .5 INJECTION, POWDER, LYOPHILIZED, FOR SOLUTION INTRAVENOUS at 12:07

## 2022-10-30 RX ADMIN — Medication 100 MILLIGRAM(S): at 12:08

## 2022-10-30 NOTE — PROGRESS NOTE ADULT - PROBLEM SELECTOR PLAN 3
Pt has hx of Follicular lymphoma with DLBCL treated with R-CHOP in 2003, with relapse in 2009, treated with BR.  Now with multiple areas of palpable lymphadenopathy - at least follicular lymphoma grade IIIA.    -Chemo regimen consists of: modified regimen of Obinutuzumab, cyclophosphamide, Etoposide, vincristine and 100 mg prednisone on days 1–5 per outpatient Onc documentation.  -onc consulted appreciate recommendations: infectious work-up per primary team - Pt found down prior to admission  - pt w/ hx of PPM, likely suspect that fall was due to AMS  - Plan for PPM interrogation in AM - Pt found down prior to admission  - pt w/ hx of PPM, likely suspect that fall was due to AMS  - Plan for PPM interrogation in AM ([employer] quad CRT-P MRI [id number])

## 2022-10-30 NOTE — DISCHARGE NOTE PROVIDER - DETAILS OF MALNUTRITION DIAGNOSIS/DIAGNOSES
This patient has been assessed with a concern for Malnutrition and was treated during this hospitalization for the following Nutrition diagnosis/diagnoses:     -  11/02/2022: Severe protein-calorie malnutrition

## 2022-10-30 NOTE — PROGRESS NOTE ADULT - PROBLEM SELECTOR PLAN 5
On outpatient documentation pt reportedly has CKD3 with baseline SCr 1.5. On admission, pt's SCr was 1.49, elevated compared to recent values of 1.09. Possibly prerenal STEPHEN i/s/o sepsis vs intrinsic disease 2/2 contrast load or chemo side effect.    -Trend SCr  -Can consider urine lytes, nephro consult if worsening  -bladder scan to r/o obstructive STEPHEN - Possibly myelosuppression 2/2 sepsis in setting of recent chemotherapy and cancer.  - Iron labs c/w GEORGETTE, consider iron supplementation

## 2022-10-30 NOTE — PROGRESS NOTE ADULT - ATTENDING COMMENTS
72 year old male with afib (on eliquis), HTN,  complete heart block s/p PPM, HLD, HFrEF (30% in 09/2022), and DLBCL (tx with R-CHOP in 2003, with relapse in 2009 treated with BR) now with recently diagnosed grade 3 follicular lymphoma BIBEMS with fever and AMS after being found down by his daughter admitted for acute metabolic metabolic encephalopathy with sepsis 2/2 pneumonia.    #Acute metabolic encephalopathy  #Sepsis  #PNA  #Fall  #Non Hodgkins Lymphoma    -cw zosyn  -monitor fever curve  -call Monday for PPM interrogation  -Consult pulm Monday for loculated RML effusion on CT  -PT -CARY    d/w HS3    Lindy Gonzalez MD  Division of Hospital Medicine  Available on Microsoft Teams

## 2022-10-30 NOTE — DISCHARGE NOTE PROVIDER - NSDCFUSCHEDAPPT_GEN_ALL_CORE_FT
Jason Cordova  [facility]  [facility]  Scheduled Appointment: 11/01/2022    [facility]  [facility]  Scheduled Appointment: 11/11/2022    [facility]  Scheduled Appointment: 11/14/2022    [facility]  [facility]  Scheduled Appointment: 12/02/2022    [facility]  [facility]  Scheduled Appointment: 12/05/2022    [facility]  Scheduled Appointment: 12/23/2022    [facility]  Scheduled Appointment: 12/27/2022    [facility]  ELECTROPH [address]  Scheduled Appointment: 01/12/2023    Lon Reyes  [facility]  CARDIOLOGY [address] [facility]   Scheduled Appointment: 01/16/2023     [facility]  [facility]  Scheduled Appointment: 11/11/2022    [facility]  [facility]  Scheduled Appointment: 11/14/2022    [facility]  [facility]  Scheduled Appointment: 12/02/2022    [facility]  [facility]  Scheduled Appointment: 12/05/2022    [facility]  [facility]  Scheduled Appointment: 12/23/2022    [facility]  [facility]  Scheduled Appointment: 12/27/2022    [facility]  ELECTROPH [address]  Scheduled Appointment: 01/12/2023    Lon Reyes  [facility]  CARDIOLOGY [address] [facility]   Scheduled Appointment: 01/16/2023     [facility]  [facility]  Scheduled Appointment: 12/02/2022    [facility]  [facility]  Scheduled Appointment: 12/05/2022    [facility]  Scheduled Appointment: 12/23/2022    [facility]  Scheduled Appointment: 12/27/2022    [facility]  ELECTROPH [address]  Scheduled Appointment: 01/12/2023    Lon Reyes  [facility]  CARDIOLOGY [address] [facility]   Scheduled Appointment: 01/16/2023

## 2022-10-30 NOTE — PROGRESS NOTE ADULT - PROBLEM SELECTOR PLAN 4
Pt Hgb 9.5 on admission, most recently in 11 but tends to range in 9-11 range. No signs of bleeding, INR mildly elevated at 2.11. Possibly myelosuppression 2/2 sepsis in setting of recent chemotherapy and cancer.    -Trend H/H  -FOBT in 8/2022 negative  -will order iron panel with ferritin, outpatient B12/folate/TSH wnl - Onc consulted appreciate recommendations - NTD, infectious work-up per primary team

## 2022-10-30 NOTE — DISCHARGE NOTE PROVIDER - CARE PROVIDER_API CALL
Amari Hickman)  Internal Medicine  [address] [place] [address] [address]  [place] [address]  Phone: [phone]  Fax: [phone]  Follow Up Time:     Jason Cordova (DO)  Internal Medicine; Medical Oncology  [address] [place] [address]  Phone: [phone]  Fax: [phone]  Follow Up Time:

## 2022-10-30 NOTE — DISCHARGE NOTE PROVIDER - NSDCMRMEDTOKEN_GEN_ALL_CORE_FT
acyclovir 400 mg oral tablet: 1 tab(s) orally 2 times a day  allopurinol 100 mg oral tablet: 1 tab(s) orally once a day  apixaban 5 mg oral tablet: 1 tab(s) orally every 12 hours  atorvastatin 40 mg oral tablet: 1 tab(s) orally once a day (at bedtime)  Entresto 49 mg-51 mg oral tablet: 1 tab(s) orally once a day   metoprolol succinate 100 mg oral tablet, extended release: 1 tab(s) orally once a day  predniSONE 50 mg oral tablet: 2 tab(s) orally every 24 hours  Take it on 10/4   Taking during chemotherapy cycle, OP documents days 1-5, scrip documents day 2-5  senna leaf extract oral tablet: 2 tab(s) orally once a day (at bedtime), As needed, Constipation  spironolactone 25 mg oral tablet: 1 tab(s) orally once a day   acyclovir 400 mg oral tablet: 1 tab(s) orally 2 times a day  allopurinol 100 mg oral tablet: 1 tab(s) orally once a day  apixaban 5 mg oral tablet: 1 tab(s) orally every 12 hours  atorvastatin 40 mg oral tablet: 1 tab(s) orally once a day (at bedtime)  Entresto 49 mg-51 mg oral tablet: 1 tab(s) orally once a day   ferrous sulfate 325 mg (65 mg elemental iron) oral tablet: 1 tab(s) orally every other day  metoprolol succinate 100 mg oral tablet, extended release: 1 tab(s) orally once a day  Multiple Vitamins oral tablet: 1 tab(s) orally once a day  predniSONE 50 mg oral tablet: 2 tab(s) orally every 24 hours  Take it on 10/4   Taking during chemotherapy cycle, OP documents days 1-5, scrip documents day 2-5  senna leaf extract oral tablet: 2 tab(s) orally once a day (at bedtime), As needed, Constipation  spironolactone 25 mg oral tablet: 1 tab(s) orally once a day   acyclovir 400 mg oral tablet: 1 tab(s) orally 2 times a day  allopurinol 100 mg oral tablet: 1 tab(s) orally once a day  apixaban 5 mg oral tablet: 1 tab(s) orally every 12 hours  atorvastatin 40 mg oral tablet: 1 tab(s) orally once a day (at bedtime)  Entresto 49 mg-51 mg oral tablet: 1 tab(s) orally once a day   ferrous sulfate 325 mg (65 mg elemental iron) oral tablet: 1 tab(s) orally every other day  metoprolol succinate 100 mg oral tablet, extended release: 1 tab(s) orally once a day  Multiple Vitamins oral tablet: 1 tab(s) orally once a day  senna leaf extract oral tablet: 2 tab(s) orally once a day (at bedtime), As needed, Constipation  spironolactone 25 mg oral tablet: 1 tab(s) orally once a day

## 2022-10-30 NOTE — PHYSICAL THERAPY INITIAL EVALUATION ADULT - ADDITIONAL COMMENTS
Noting patient's AMS, information was retrieved from his daughter via telephonic conversation; Daughter reports that pt. lives alone in an elevator accessible apartment; independent with ADLs and do not use any AD; according to daughter, pt. cooks on his own and she helps him intermittently.

## 2022-10-30 NOTE — DISCHARGE NOTE PROVIDER - NSDCCPTREATMENT_GEN_ALL_CORE_FT
PRINCIPAL PROCEDURE  Procedure: CT  Findings and Treatment: PROCEDURE DATE:  10/28/2022    IMPRESSION:  No pulmonary embolism though evaluation of subsegmental branches is   limited secondary to timing of the bolus.  Interval consolidative and groundglass opacities in the right lower lobe   concerning for developing pneumonia. Pulmonary imaging follow-up in 6   weeks is advised demonstrate resolution.  Continued moderate loculated effusion along the minor fissure, slightly   progressed compared to prior study. Small left pleural effusion with   associated passive atelectasis of the left lung base.        SECONDARY PROCEDURE  Procedure: Echo 2D  Findings and Treatment: TTE 11/1  Conclusions:  1. Mitral annular calcification and calcified mitral  leaflets with normal diastolic opening. Tethered mitral  valve leaflets. Moderate-severe mitral regurgitation.  2. Severely dilated left atrium.  LA volume index = 53  cc/m2.Mild concentric left ventricular hypertrophy.  4. Moderate  global left ventricular systolic dysfunction.  Endocardial visualization enhanced with intravenous  injection of Ultrasonic Enhancing Agent (Lumason).  Visually, LVEF is 30-35%. Image qualitiy was inadequate to  determine quantitative LVEF.Moderate diastolic dysfunction  (Stage II).  Would suggest additional imaging with physicians  supervision of contrast echo for quantitative LVEF or CMRI  if clinically indicated.  5. Normal right atrium.Normal right ventricularsize and  function. A device wire is noted in the right heart.  6. Normal tricuspid valve. Mild-moderate tricuspid  regurgitation.Estimated pulmonary artery systolic pressure  equals 49  mm Hg, assuming right atrial pressure equals 3  mm Hg, consistent with mild pulmonary pressures.  7. No pericardial effusion seen.  *** Compared with echocardiogram of 9/2/2022, no  significant changes noted.

## 2022-10-30 NOTE — PROGRESS NOTE ADULT - PROBLEM SELECTOR PLAN 2
-Pt recently admitted in 9/2022 for similar presentation with AMS 2/2 positive rhino/enterovirus, seems to have improved and was able to be reoriented towards the end of admission. Current AMS likely in setting of sepsis 2/2 pneumonia  -Sepsis workup as above - Pt recently admitted in 9/2022 for similar presentation with AMS 2/2 positive rhino/enterovirus, seems to have improved and was able to be reoriented towards the end of admission. Current AMS likely in setting of sepsis 2/2 pneumonia  - Sepsis workup as above

## 2022-10-30 NOTE — PROGRESS NOTE ADULT - PROBLEM SELECTOR PLAN 8
-c/w home toprol  mg daily and eliquis 5 mg BID - Pt has documented hx of T2DM, previous A1Cs elevated (10 -> 7.4 in 2010) in diabetes range but most recent 8/2022 is 5.4  - ISS, will recheck A1C

## 2022-10-30 NOTE — DISCHARGE NOTE PROVIDER - NSDCCPCAREPLAN_GEN_ALL_CORE_FT
PRINCIPAL DISCHARGE DIAGNOSIS  Diagnosis: Sepsis due to pneumonia  Assessment and Plan of Treatment: You came into the hospital for confusion and we found on imaging that you had pneumonia. We treated you with oxygen and antibiotics and your respiratory status improved and your fever resolved. Please follow-up with your primary care doctor to discuss your recent hospitalization.  If you begin having more confusion, trouble breathing, fevers or chills, or cough, please return to the hospital for immediate evaluation.      SECONDARY DISCHARGE DIAGNOSES  Diagnosis: Non-Hodgkins lymphoma  Assessment and Plan of Treatment: You have a history of blood cancer called lymphoma. You were undergoing treatment with the oncologists/cancer doctors. We spoke at length about your chemotherapy plan and we agreed with the interventional radiologists and the oncologists that you will be discharged from the hospital to rehab, and follow-up with interventional radiology to have a Mediport placed. Please call the number provided to make a followup appointment for the Mediport. Please also call your oncologist to coordinate your next chemotherapy treatment at [facility].    Diagnosis: Anemia  Assessment and Plan of Treatment: We found that you were anemic when you came to the hospital, meaning that you had low blood counts. We believe this is likely due to your blood cancer. We treated you with iron. Please continue the iron tablets, take one tablet once every other day to improve your blood counts. If you begin feeling lightheaded or dizziness, or you begin coughing up blood or having blood in your urine or stool, please return to the hospital for immediate evaluation.    Diagnosis: Transaminitis  Assessment and Plan of Treatment: We found that you had abnormal liver function tests when you came to the hospital. They were not severe enough to address immediately given your other infections, but we recommend that you follow-up with your primary care physician to recheck your liver function tests and potentially work this up. If you begin having stomach pain, diarrhea, yellowing of your skin, or darkening of your urine, please return to the hospital for re-evaluation.    Diagnosis: Acute kidney injury superimposed on CKD  Assessment and Plan of Treatment: We found on labs that you had decreased kidney function when you came to the hospital. We believe that this is likely due to not eating enough as well as an infection. We treated you with fluids and monitored your kidney function and it improved with time. Please follow-up with your primary care doctor to discuss your recent hospitalization. If you begin having difficulty urinating or pain while urinating please return to the hospital for evaluation.     PRINCIPAL DISCHARGE DIAGNOSIS  Diagnosis: Sepsis due to pneumonia  Assessment and Plan of Treatment: You came into the hospital for confusion and we found on imaging that you had pneumonia. We treated you with oxygen and antibiotics and your respiratory status improved and your fever resolved. Please follow-up with your primary care doctor to discuss your recent hospitalization.  If you begin having more confusion, trouble breathing, fevers or chills, or cough, please return to the hospital for immediate evaluation.      SECONDARY DISCHARGE DIAGNOSES  Diagnosis: Non-Hodgkins lymphoma  Assessment and Plan of Treatment: You have a history of blood cancer called lymphoma. You were undergoing treatment with the oncologists/cancer doctors. We spoke at length about your chemotherapy plan and we agreed with the interventional radiologists and the oncologists that you will be discharged from the hospital to rehab, and follow-up with interventional radiology ([phone]) to have a Mediport placed. Please call the number to make a followup appointment for the Mediport. Please also call your oncologist to coordinate your next chemotherapy treatment at [facility]. If you begin having dizziness or lightheadedness, fever or chills, please return to the hospital for evaluation.    Diagnosis: Anemia  Assessment and Plan of Treatment: We found that you were anemic when you came to the hospital, meaning that you had low blood counts. We believe this is likely due to your blood cancer. We treated you with iron. Please continue the iron tablets, take one tablet once every other day to improve your blood counts. If you begin feeling lightheaded or dizziness, or you begin coughing up blood or having blood in your urine or stool, please return to the hospital for immediate evaluation.    Diagnosis: Transaminitis  Assessment and Plan of Treatment: We found that you had abnormal liver function tests when you came to the hospital. They were not severe enough to address immediately given your other infections, but we recommend that you follow-up with your primary care physician to recheck your liver function tests and potentially work this up. If you begin having stomach pain, diarrhea, yellowing of your skin, or darkening of your urine, please return to the hospital for re-evaluation.    Diagnosis: Acute kidney injury superimposed on CKD  Assessment and Plan of Treatment: We found on labs that you had decreased kidney function when you came to the hospital. We believe that this is likely due to not eating enough as well as an infection. We treated you with fluids and monitored your kidney function and it improved with time. Please follow-up with your primary care doctor to discuss your recent hospitalization. If you begin having difficulty urinating or pain while urinating please return to the hospital for evaluation.     PRINCIPAL DISCHARGE DIAGNOSIS  Diagnosis: Sepsis due to pneumonia  Assessment and Plan of Treatment: You came into the hospital for confusion and we found on imaging that you had pneumonia. We treated you with oxygen and antibiotics and your respiratory status improved and your fever resolved. Please follow-up with your primary care doctor to discuss your recent hospitalization.  If you begin having more confusion, trouble breathing, fevers or chills, or cough, please return to the hospital for immediate evaluation.      SECONDARY DISCHARGE DIAGNOSES  Diagnosis: Non-Hodgkins lymphoma  Assessment and Plan of Treatment: You have a history of blood cancer called lymphoma. You were undergoing treatment with the oncologists/cancer doctors. We spoke at length about your chemotherapy plan and we agreed with the interventional radiologists and the oncologists that you will be discharged from the hospital to rehab, and follow-up with interventional radiology ([phone]) to have a Mediport placed. Please call the number to make a followup appointment for the Mediport. Please also call your oncologist to coordinate your next chemotherapy treatment at [facility]. If you begin having dizziness or lightheadedness, fever or chills, please return to the hospital for evaluation.    Diagnosis: Anemia  Assessment and Plan of Treatment: We found that you were anemic when you came to the hospital, meaning that you had low blood counts. We believe this is likely due to your blood cancer. We treated you with iron. Please continue the iron tablets, take one tablet once every other day to improve your blood counts. If you begin feeling lightheaded or dizziness, or you begin coughing up blood or having blood in your urine or stool, please return to the hospital for immediate evaluation.    Diagnosis: Transaminitis  Assessment and Plan of Treatment: We found that you had abnormal liver function tests when you came to the hospital. They were not severe enough to address immediately given your other infections, but we recommend that you follow-up with your primary care physician to recheck your liver function tests and potentially work this up. If you begin having stomach pain, diarrhea, yellowing of your skin, or darkening of your urine, please return to the hospital for re-evaluation.    Diagnosis: Acute kidney injury superimposed on CKD  Assessment and Plan of Treatment: We found on labs that you had decreased kidney function when you came to the hospital. We believe that this is likely due to not eating enough as well as an infection. We treated you with fluids and monitored your kidney function and it improved with time. Please follow-up with your primary care doctor to discuss your recent hospitalization. If you begin having difficulty urinating, urinating less, or pain while urinating please return to the hospital for evaluation.

## 2022-10-30 NOTE — PROGRESS NOTE ADULT - ASSESSMENT
72 year old male with afib (on eliquis), HTN,  complete heart block s/p PPM, HLD, HFrEF (30% in 09/2022), and DLBCL (tx with R-CHOP in 2003, with relapse in 2009 treated with BR) now with recently diagnosed grade 3 follicular lymphoma BIBEMS with fever and AMS after being found down by his daughter this AM. Presentation concerning for toxic metabolic encephalopathy in setting of sepsis possible 2/2 pneumonia. 72 year old male with afib (on eliquis), HTN, complete heart block s/p PPM, HLD, HFrEF (30% in 09/2022), and DLBCL (tx with R-CHOP in 2003, with relapse in 2009 treated with BR) now with recently diagnosed grade 3 follicular lymphoma BIBEMS with fever and AMS after being found down by his daughter this AM. Presentation concerning for toxic metabolic encephalopathy in setting of sepsis possible 2/2 pneumonia w/ loculation on CT, being treated w/ abx with recurrent fevers c/f poor penetration of loculated effusion.

## 2022-10-30 NOTE — PHYSICAL THERAPY INITIAL EVALUATION ADULT - PERTINENT HX OF CURRENT PROBLEM, REHAB EVAL
72 year old male with afib/aflutter (on eliquis), HTN,  complete heart block s/p PPM, HLD, HFrEF (30% in 09/2022), and DLBCL (tx with R-CHOP in 2003, with relapse in 2009 treated with BR) now with recently diagnosed grade 3 follicular lymphoma BIBEMS with fever and AMS after being found down by his daughter this AM. Pt is acutely altered and unable to reach daughter, but per ED note, patient was found by daughter on the floor after she had to break in when he was not answering the door. Daughter noted that pt was at his baseline up until 12 PM, later went to see him at around 6:30 PM which is when she found him on the dining room floor. Pt had episode of urinary incontinence, which is not normal. At bedside, pt is AAOx2, remembers falling but does not remember the year. Hospital course: 10/28 CXR: small RT pleural effusion; 10/28 CT head/PE protocol/C-spine: No ICH, No PE, No fx @ C-spine. 10/28 CT abd/pelvis : Hepatomegaly, cholestasis. Patient is currently being followed up by internal medicine and oncology

## 2022-10-30 NOTE — PROGRESS NOTE ADULT - PROBLEM SELECTOR PLAN 6
TTE 9/2022: EF 30%, severe global left ventricular systolic dysfunction. Mild RV enlargement with decreased RV systolic function      - c/w Toprol  mg daily, atorvastatin 40 mg daily  - hold Entresto 49/51 PO BID, spironolactone 25 mg - On outpatient documentation pt reportedly has CKD3 with baseline SCr 1.5. On admission, pt's SCr was 1.49, elevated compared to recent values of 1.09. Possibly prerenal STEPHEN i/s/o sepsis vs intrinsic disease 2/2 contrast load or chemo side effect.  - Trend SCr  - Can consider urine lytes, nephro consult if worsening

## 2022-10-30 NOTE — PROGRESS NOTE ADULT - SUBJECTIVE AND OBJECTIVE BOX
***************************************************************  Jaidenluis m Mendez (Ji-Cheng)h PGY1  Internal Medicine   ***************************************************************    NIK GRIMM  72y  MRN: [id number]    Patient is a 72y old  Male who presents with a chief complaint of hypoxia (29 Oct 2022 11:00)      Subjective: no events ON. Denies fever, CP, SOB, abn pain, N/V, dysuria. Tolerating diet.      MEDICATIONS  (STANDING):  acyclovir   Oral Tab/Cap 400 milliGRAM(s) Oral two times a day  allopurinol 100 milliGRAM(s) Oral daily  apixaban 2.5 milliGRAM(s) Oral every 12 hours  atorvastatin 40 milliGRAM(s) Oral at bedtime  chlorhexidine 2% Cloths 1 Application(s) Topical daily  metoprolol succinate  milliGRAM(s) Oral daily  piperacillin/tazobactam IVPB.. 3.375 Gram(s) IV Intermittent every 8 hours    MEDICATIONS  (PRN):  senna 2 Tablet(s) Oral at bedtime PRN Constipation      Objective:    Vitals: Vital Signs Last 24 Hrs  T(C): 37.8 (10-30-22 @ 04:20), Max: 39.4 (10-29-22 @ 11:46)  T(F): 100.1 (10-30-22 @ 04:20), Max: 103 (10-29-22 @ 11:46)  HR: 103 (10-30-22 @ 04:20) (77 - 103)  BP: 153/78 (10-30-22 @ 04:20) (123/63 - 153/78)  BP(mean): --  RR: 18 (10-30-22 @ 04:20) (16 - 18)  SpO2: 99% (10-30-22 @ 04:20) (95% - 99%)            I&O's Summary    28 Oct 2022 07:01  -  29 Oct 2022 07:00  --------------------------------------------------------  IN: 0 mL / OUT: 200 mL / NET: -200 mL        PHYSICAL EXAM:  GENERAL: NAD  HEAD:  Atraumatic, Normocephalic  EYES: EOMI, conjunctiva and sclera clear  CHEST/LUNG: Clear to auscultation bilaterally; No rales, rhonchi, wheezing, or rubs  HEART: Regular rate and rhythm; No murmurs, rubs, or gallops  ABDOMEN: Soft, Nontender, Nondistended;   SKIN: No rashes or lesions  NERVOUS SYSTEM:  Alert & Oriented X3, no focal deficits    LABS:  10-29    141  |  99  |  31<H>  ----------------------------<  123<H>  3.5   |  25  |  1.42<H>  10-28    140  |  99  |  32<H>  ----------------------------<  142<H>  3.5   |  25  |  1.49<H>    Ca    9.9      29 Oct 2022 06:00  Ca    9.0      28 Oct 2022 21:34  Phos  4.1     10-29  Mg     1.8     10-29    TPro  8.5<H>  /  Alb  4.1  /  TBili  2.0<H>  /  DBili  x   /  AST  52<H>  /  ALT  43  /  AlkPhos  222<H>  10-29  TPro  6.8  /  Alb  3.5  /  TBili  1.6<H>  /  DBili  x   /  AST  33  /  ALT  35  /  AlkPhos  178<H>  10-28      PT/INR - ( 29 Oct 2022 05:59 )   PT: 19.9 sec;   INR: 1.72 ratio         PTT - ( 29 Oct 2022 05:59 )  PTT:33.9 sec              Urinalysis Basic - ( 28 Oct 2022 23:43 )    Color: Yellow / Appearance: Clear / S.034 / pH: x  Gluc: x / Ketone: Trace  / Bili: Negative / Urobili: <2 mg/dL   Blood: x / Protein: 300 mg/dL / Nitrite: Negative   Leuk Esterase: Negative / RBC: 0-2 /hpf / WBC 0-2   Sq Epi: x / Non Sq Epi: Occasional / Bacteria: Negative                              12.3   12.69 )-----------( 195      ( 29 Oct 2022 05:59 )             39.8                         9.5    14.00 )-----------( 143      ( 28 Oct 2022 21:34 )             30.3     CAPILLARY BLOOD GLUCOSE          RADIOLOGY & ADDITIONAL TESTS:    Imaging Personally Reviewed:  [x ] YES  [ ] NO    Consultants involved in case:   Consultant(s) Notes Reviewed:  [ x] YES  [ ] NO:   Care Discussed with Consultants/Other Providers [x ] YES  [ ] NO         ***************************************************************  Hwang (Ji-Cheng) [facility] PGY1  Internal Medicine   ***************************************************************    NIK GRIMM  72y  MRN: [id number]    72 year old male with afib (on eliquis), HTN, complete heart block s/p PPM, HLD, HFrEF (30% in 2022), and DLBCL (tx with R-CHOP in , with relapse in  treated with BR) now with recently diagnosed grade 3 follicular lymphoma BIBEMS with fever and AMS after being found down by his daughter this AM. Presentation concerning for toxic metabolic encephalopathy in setting of sepsis possible 2/2 pneumonia w/ loculation on CT, being treated w/ abx.     Subjective: Fever overnight to 103. No acute complaints today, no SOB.     MEDICATIONS  (STANDING):  acyclovir   Oral Tab/Cap 400 milliGRAM(s) Oral two times a day  allopurinol 100 milliGRAM(s) Oral daily  apixaban 2.5 milliGRAM(s) Oral every 12 hours  atorvastatin 40 milliGRAM(s) Oral at bedtime  chlorhexidine 2% Cloths 1 Application(s) Topical daily  metoprolol succinate  milliGRAM(s) Oral daily  piperacillin/tazobactam IVPB.. 3.375 Gram(s) IV Intermittent every 8 hours    MEDICATIONS  (PRN):  senna 2 Tablet(s) Oral at bedtime PRN Constipation      Objective:    Vitals: Vital Signs Last 24 Hrs  T(C): 37.8 (10-30-22 @ 04:20), Max: 39.4 (10-29-22 @ 11:46)  T(F): 100.1 (10-30-22 @ 04:20), Max: 103 (10-29-22 @ 11:46)  HR: 103 (10-30-22 @ 04:20) (77 - 103)  BP: 153/78 (10-30-22 @ 04:20) (123/63 - 153/78)  BP(mean): --  RR: 18 (10-30-22 @ 04:20) (16 - 18)  SpO2: 99% (10-30-22 @ 04:20) (95% - 99%)            I&O's Summary    28 Oct 2022 07:01  -  29 Oct 2022 07:00  --------------------------------------------------------  IN: 0 mL / OUT: 200 mL / NET: -200 mL        PHYSICAL EXAM:  GENERAL: NAD  HEAD:  Atraumatic, Normocephalic  EYES: EOMI, conjunctiva and sclera clear  CHEST/LUNG: Clear to auscultation bilaterally; No rales, rhonchi, wheezing, or rubs  HEART: Regular rate and rhythm; No murmurs, rubs, or gallops  ABDOMEN: Soft, Nontender, Nondistended;   SKIN: No rashes or lesions  NERVOUS SYSTEM:  Alert & Oriented X1-2, no focal deficits    LABS:  10-29    141  |  99  |  31<H>  ----------------------------<  123<H>  3.5   |  25  |  1.42<H>  10-28    140  |  99  |  32<H>  ----------------------------<  142<H>  3.5   |  25  |  1.49<H>    Ca    9.9      29 Oct 2022 06:00  Ca    9.0      28 Oct 2022 21:34  Phos  4.1     10-29  Mg     1.8     10-29    TPro  8.5<H>  /  Alb  4.1  /  TBili  2.0<H>  /  DBili  x   /  AST  52<H>  /  ALT  43  /  AlkPhos  222<H>  10-29  TPro  6.8  /  Alb  3.5  /  TBili  1.6<H>  /  DBili  x   /  AST  33  /  ALT  35  /  AlkPhos  178<H>  10-28      PT/INR - ( 29 Oct 2022 05:59 )   PT: 19.9 sec;   INR: 1.72 ratio         PTT - ( 29 Oct 2022 05:59 )  PTT:33.9 sec              Urinalysis Basic - ( 28 Oct 2022 23:43 )    Color: Yellow / Appearance: Clear / S.034 / pH: x  Gluc: x / Ketone: Trace  / Bili: Negative / Urobili: <2 mg/dL   Blood: x / Protein: 300 mg/dL / Nitrite: Negative   Leuk Esterase: Negative / RBC: 0-2 /hpf / WBC 0-2   Sq Epi: x / Non Sq Epi: Occasional / Bacteria: Negative                              12.3   12.69 )-----------( 195      ( 29 Oct 2022 05:59 )             39.8                         9.5    14.00 )-----------( 143      ( 28 Oct 2022 21:34 )             30.3     CAPILLARY BLOOD GLUCOSE          RADIOLOGY & ADDITIONAL TESTS:    Imaging Personally Reviewed:  [x ] YES  [ ] NO    Consultants involved in case:   Consultant(s) Notes Reviewed:  [ x] YES  [ ] NO:   Care Discussed with Consultants/Other Providers [x ] YES  [ ] NO

## 2022-10-30 NOTE — DISCHARGE NOTE PROVIDER - NSDCFUADDAPPT_GEN_ALL_CORE_FT
Please follow-up with your primary care physician, your oncologist, and the interventional radiology team to discuss your recent admission, arrange your Mediport placement, and plan your chemotherapy. Please call the [phone] number to book your Mediport placement with interventional radiology.  Please follow-up with your primary care physician (Dr. Hickman), your oncologist (Dr. Cordova), and the interventional radiology team to discuss your recent admission, arrange your Mediport placement, and plan your chemotherapy. Please call the [phone] number to book your Mediport placement with interventional radiology.

## 2022-10-30 NOTE — DISCHARGE NOTE PROVIDER - HOSPITAL COURSE
72 year old male with afib/aflutter (on eliquis), HTN,  complete heart block s/p PPM, HLD, HFrEF (30% in 09/2022), and DLBCL (tx with [facility] in 2003, with relapse in 2009 treated with BR) now with recently diagnosed grade 3 follicular lymphoma BIBEMS with fever and AMS after being found down by his daughter this AM. Pt is acutely altered and unable to reach daughter, but per ED note, patient was found by daughter on the floor after she had to break in when he was not answering the door. Daughter noted that pt was at his baseline up until 12 PM, later went to see him at around 6:30 PM which is when she found him on the dining room floor. Pt had episode of urinary incontinence, which is not normal. At bedside, pt is AAOx2, remembers falling but does not remember the year. Reports no complaints, denies fever, chills, CP, SOB, nausea, vomiting, diarrhea, constipation, dysuria, hematuria, hematochezia, melena, numbness, tingling. Pt reports he lives alone, ambulates without assistance at baseline. Mr. Skaggs is a 72 year old male with afib/aflutter (on eliquis), HTN, complete heart block s/p PPM, HLD, HFrEF (30% in 09/2022), and DLBCL (tx with R-[facility] in 2003, with relapse in 2009 treated with BR) now with recently diagnosed grade 3 follicular lymphoma BIBEMS with fever and AMS after being found down by his daughter this AM. Pt is acutely altered and unable to reach daughter, but per ED note, patient was found by daughter on the floor after she had to break in when he was not answering the door. Daughter noted that pt was at his baseline up until 12 PM, later went to see him at around 6:30 PM which is when she found him on the dining room floor. Pt had episode of urinary incontinence, which is not normal. At bedside, pt is AAOx2, remembers falling but does not remember the year. Reports no complaints, denies fever, chills, CP, SOB, nausea, vomiting, diarrhea, constipation, dysuria, hematuria, hematochezia, melena, numbness, tingling. Pt reports he lives alone, ambulates without assistance at baseline.    CT performed during admission demonstrated Interval consolidative and groundglass opacities in the right lower lobe concerning for developing pneumonia. Pulmonary imaging follow-up in 6 weeks is advised demonstrate resolution. Continued moderate loculated effusion along the minor fissure, slightly progressed compared to prior study. Small left pleural effusion with associated passive atelectasis of the left lung base. Patient was admitted for altered mental status in setting of pneumonia and completed a 7-day course of empiric Zosyn (10/29-11/4) with cessation of fevers and improvement of respiratory status. Pulmonary was consulted for his pleural effusion as a possible source of infection that needed to be address, and deferred thoracentesis due to unchanged appearance of pleural effusion from prior CT. Patient remained afebrile and on room air after treatment with antibiotics and mental status remained at his baseline - AO1-2, conversational but confused at times. Patient was also found to have STEPHEN which was likely pre-renal/ATN in setting of poor PO - patient was repleted with fluids and encouraged PO with downtrend of Cr back to baseline. PT saw patient and recommended subacute rehab. Discussion was had between oncology and interventional radiology regarding patient's access and plan for chemotherapy as an outpatient while at rehab. Discussed possibility of PICC or tunneled venous line with daughter and daughter adamantly refused, preferring Mediport placement when able. Decision was made to discharge patient to subacute rehab with close follow-up for mediport placement and initiation of next chemotherapy treatment with IR and Oncology in agreement. On day of discharge patient remains well from an infectious and respiratory standpoint and is stable for discharge.    New Medications:  - Iron 325 mg every other day    Follow-ups  - Interventional Radiology <1 week for Mediport placement  - Oncologist <2 weeks for chemotherapy   - Primary care provider <4 weeks to discuss recent admission. Mr. Skaggs is a 72 year old male with afib/aflutter (on eliquis), HTN, complete heart block s/p PPM, HLD, HFrEF (30% in 09/2022), and DLBCL (tx with R-[facility] in 2003, with relapse in 2009 treated with BR) now with recently diagnosed grade 3 follicular lymphoma BIBEMS with fever and AMS after being found down by his daughter this AM. Pt is acutely altered and unable to reach daughter, but per ED note, patient was found by daughter on the floor after she had to break in when he was not answering the door. Daughter noted that pt was at his baseline up until 12 PM, later went to see him at around 6:30 PM which is when she found him on the dining room floor. Pt had episode of urinary incontinence, which is not normal. At bedside, pt is AAOx2, remembers falling but does not remember the year. Reports no complaints, denies fever, chills, CP, SOB, nausea, vomiting, diarrhea, constipation, dysuria, hematuria, hematochezia, melena, numbness, tingling. Pt reports he lives alone, ambulates without assistance at baseline.    CT performed during admission demonstrated Interval consolidative and groundglass opacities in the right lower lobe concerning for developing pneumonia. Pulmonary imaging follow-up in 6 weeks is advised demonstrate resolution. Continued moderate loculated effusion along the minor fissure, slightly progressed compared to prior study. Small left pleural effusion with associated passive atelectasis of the left lung base. Patient was admitted for altered mental status in setting of pneumonia and completed a 7-day course of empiric Zosyn (10/29-11/4) with cessation of fevers and improvement of respiratory status. Pulmonary was consulted for his pleural effusion as a possible source of infection that needed to be address, and deferred thoracentesis due to unchanged appearance of pleural effusion from prior CT. Repeat TTE was unchanged. Patient remained afebrile and on room air after treatment with antibiotics and mental status remained at his baseline - AO1-2, conversational but confused at times. Patient was also found to have STEPHEN which was likely pre-renal/ATN in setting of poor PO - patient was repleted with fluids and encouraged PO with downtrend of Cr back to baseline. Patient's course was also complicated by transaminitis with obstructive pattern - ALP remained elevated to 160-200 throughout admission but stable, patient otherwise asymptomatic. PT saw patient and recommended subacute rehab. Discussion was had between oncology and interventional radiology regarding patient's access and plan for chemotherapy as an outpatient while at rehab. Discussed possibility of PICC or tunneled venous line with daughter and daughter adamantly refused, preferring Mediport placement when able. Decision was made to discharge patient to subacute rehab with close follow-up for mediport placement and initiation of next chemotherapy treatment with IR and Oncology in agreement. On day of discharge patient remains well from an infectious and respiratory standpoint and is stable for discharge.    New Medications:  - Iron 325 mg every other day    Follow-ups  - Interventional Radiology <1 week for Mediport placement  - Oncologist <2 weeks for chemotherapy   - Primary care provider <4 weeks to discuss recent admission and potentially work-up elevated isolated alk-phos

## 2022-10-30 NOTE — PROGRESS NOTE ADULT - PROBLEM SELECTOR PLAN 10
DVT Ppx: eliquis  Diet: Soft and bite sized diet  Communication: Spoke with daughter 10/29: 6:30PM DVT PPx: Eliquis  Diet: Soft and bite sized diet  Bowel Regimen: Last BM:    Miralax/Senna/Mag Citrate/ Lactulose/Enema  Code: Full/DNR/DNI   Dispo: PT/OT Pending Hospital Course  Pharmacy:  PCP:   Communication: Spoke with daughter 10/29: 6:30PM DVT PPx: Eliquis  Diet: Soft and bite sized diet  Bowel Regimen: Last BM:    Miralax/Senna/Mag Citrate/ Lactulose/Enema  Code: Full/DNR/DNI   Dispo: [facility]  Pharmacy:  PCP:   Communication: Spoke with daughter 10/29: 6:30PM

## 2022-10-30 NOTE — PROGRESS NOTE ADULT - PROBLEM SELECTOR PLAN 1
Tmax 102.6, HR 97, mildly tachypneic to 22, WBC 14, meeting SIRS criteria. GGOs and consolidations seen in RLL c/f pneumonia. Pt on chemotherapy for non-Hodgkins Lymphoma making him immunocompromised.  -c/w zosyn for empiric coverage (10/29-)  -c/w home acyclovir ppx  -f/u BCx x 2 and UCx  -f/u MRSA swab, urine legionella and strep, procal Tmax 102.6, HR 97, mildly tachypneic to 22, WBC 14, meeting SIRS criteria. GGOs and consolidations seen in RLL c/f pneumonia. Pt on chemotherapy for non-Hodgkins Lymphoma making him immunocompromised.  -BCx x 2 and UCx ngtd, MRSA/Legionella negative. Suspect on-going high fevers likely due to loculation seen on CT.   -c/w zosyn for empiric coverage (10/29-)  -c/w home acyclovir ppx  -Pulm consultation for drainage in AM

## 2022-10-30 NOTE — PROGRESS NOTE ADULT - PROBLEM SELECTOR PLAN 9
Will f/u med rec in AM with daughter/pharmacy, could not reach daughter overnight and pt unable to remember his medications given AMS. -c/w home toprol  mg daily and eliquis 5 mg BID

## 2022-10-30 NOTE — PROGRESS NOTE ADULT - PROBLEM SELECTOR PLAN 7
Pt has documented hx of T2DM, previous A1Cs elevated (10 -> 7.4 in 2010) in diabetes range but most recent 8/2022 is 5.4    -ISS, will recheck A1C - TTE 9/2022: EF 30%, severe global left ventricular systolic dysfunction. Mild RV enlargement with decreased RV systolic function  - c/w Toprol  mg daily, atorvastatin 40 mg daily  - hold Entresto 49/51 PO BID, spironolactone 25 mg

## 2022-10-31 LAB
ALBUMIN SERPL ELPH-MCNC: 2.8 G/DL — LOW (ref 3.3–5)
ALP SERPL-CCNC: 126 U/L — HIGH (ref 40–120)
ALT FLD-CCNC: 97 U/L — HIGH (ref 10–45)
ANION GAP SERPL CALC-SCNC: 13 MMOL/L — SIGNIFICANT CHANGE UP (ref 5–17)
ANION GAP SERPL CALC-SCNC: 14 MMOL/L — SIGNIFICANT CHANGE UP (ref 5–17)
AST SERPL-CCNC: 150 U/L — HIGH (ref 10–40)
BILIRUB SERPL-MCNC: 1.7 MG/DL — HIGH (ref 0.2–1.2)
BUN SERPL-MCNC: 41 MG/DL — HIGH (ref 7–23)
BUN SERPL-MCNC: 43 MG/DL — HIGH (ref 7–23)
CALCIUM SERPL-MCNC: 8.6 MG/DL — SIGNIFICANT CHANGE UP (ref 8.4–10.5)
CALCIUM SERPL-MCNC: 8.6 MG/DL — SIGNIFICANT CHANGE UP (ref 8.4–10.5)
CHLORIDE SERPL-SCNC: 101 MMOL/L — SIGNIFICANT CHANGE UP (ref 96–108)
CHLORIDE SERPL-SCNC: 102 MMOL/L — SIGNIFICANT CHANGE UP (ref 96–108)
CO2 SERPL-SCNC: 23 MMOL/L — SIGNIFICANT CHANGE UP (ref 22–31)
CO2 SERPL-SCNC: 23 MMOL/L — SIGNIFICANT CHANGE UP (ref 22–31)
CREAT SERPL-MCNC: 2.15 MG/DL — HIGH (ref 0.5–1.3)
CREAT SERPL-MCNC: 2.33 MG/DL — HIGH (ref 0.5–1.3)
EGFR: 29 ML/MIN/1.73M2 — LOW
EGFR: 32 ML/MIN/1.73M2 — LOW
GLUCOSE SERPL-MCNC: 125 MG/DL — HIGH (ref 70–99)
GLUCOSE SERPL-MCNC: 152 MG/DL — HIGH (ref 70–99)
HCT VFR BLD CALC: 28.7 % — LOW (ref 39–50)
HGB BLD-MCNC: 9 G/DL — LOW (ref 13–17)
MAGNESIUM SERPL-MCNC: 1.9 MG/DL — SIGNIFICANT CHANGE UP (ref 1.6–2.6)
MAGNESIUM SERPL-MCNC: 1.9 MG/DL — SIGNIFICANT CHANGE UP (ref 1.6–2.6)
MCHC RBC-ENTMCNC: 27.4 PG — SIGNIFICANT CHANGE UP (ref 27–34)
MCHC RBC-ENTMCNC: 31.4 GM/DL — LOW (ref 32–36)
MCV RBC AUTO: 87.2 FL — SIGNIFICANT CHANGE UP (ref 80–100)
NRBC # BLD: 0 /100 WBCS — SIGNIFICANT CHANGE UP (ref 0–0)
PHOSPHATE SERPL-MCNC: 3.1 MG/DL — SIGNIFICANT CHANGE UP (ref 2.5–4.5)
PHOSPHATE SERPL-MCNC: 3.2 MG/DL — SIGNIFICANT CHANGE UP (ref 2.5–4.5)
PLATELET # BLD AUTO: 144 K/UL — LOW (ref 150–400)
POTASSIUM SERPL-MCNC: 3.1 MMOL/L — LOW (ref 3.5–5.3)
POTASSIUM SERPL-MCNC: 3.4 MMOL/L — LOW (ref 3.5–5.3)
POTASSIUM SERPL-SCNC: 3.1 MMOL/L — LOW (ref 3.5–5.3)
POTASSIUM SERPL-SCNC: 3.4 MMOL/L — LOW (ref 3.5–5.3)
PROT SERPL-MCNC: 6.3 G/DL — SIGNIFICANT CHANGE UP (ref 6–8.3)
RBC # BLD: 3.29 M/UL — LOW (ref 4.2–5.8)
RBC # FLD: 19.9 % — HIGH (ref 10.3–14.5)
S PNEUM AG UR QL: NEGATIVE — SIGNIFICANT CHANGE UP
SODIUM SERPL-SCNC: 138 MMOL/L — SIGNIFICANT CHANGE UP (ref 135–145)
SODIUM SERPL-SCNC: 138 MMOL/L — SIGNIFICANT CHANGE UP (ref 135–145)
WBC # BLD: 6.95 K/UL — SIGNIFICANT CHANGE UP (ref 3.8–10.5)
WBC # FLD AUTO: 6.95 K/UL — SIGNIFICANT CHANGE UP (ref 3.8–10.5)

## 2022-10-31 PROCEDURE — 99233 SBSQ HOSP IP/OBS HIGH 50: CPT | Mod: GC

## 2022-10-31 PROCEDURE — 99223 1ST HOSP IP/OBS HIGH 75: CPT | Mod: GC,25

## 2022-10-31 PROCEDURE — 76604 US EXAM CHEST: CPT | Mod: 26,GC

## 2022-10-31 PROCEDURE — 93284 PRGRMG EVAL IMPLANTABLE DFB: CPT | Mod: 26

## 2022-10-31 RX ORDER — SODIUM CHLORIDE 9 MG/ML
1000 INJECTION, SOLUTION INTRAVENOUS
Refills: 0 | Status: DISCONTINUED | OUTPATIENT
Start: 2022-10-31 | End: 2022-10-31

## 2022-10-31 RX ORDER — POTASSIUM CHLORIDE 20 MEQ
10 PACKET (EA) ORAL
Refills: 0 | Status: COMPLETED | OUTPATIENT
Start: 2022-10-31 | End: 2022-10-31

## 2022-10-31 RX ORDER — ACETAMINOPHEN 500 MG
650 TABLET ORAL ONCE
Refills: 0 | Status: COMPLETED | OUTPATIENT
Start: 2022-10-31 | End: 2022-10-31

## 2022-10-31 RX ORDER — ALLOPURINOL 300 MG
50 TABLET ORAL
Refills: 0 | Status: DISCONTINUED | OUTPATIENT
Start: 2022-11-01 | End: 2022-11-07

## 2022-10-31 RX ORDER — POTASSIUM CHLORIDE 20 MEQ
40 PACKET (EA) ORAL ONCE
Refills: 0 | Status: COMPLETED | OUTPATIENT
Start: 2022-10-31 | End: 2022-10-31

## 2022-10-31 RX ORDER — SODIUM CHLORIDE 9 MG/ML
1000 INJECTION, SOLUTION INTRAVENOUS
Refills: 0 | Status: DISCONTINUED | OUTPATIENT
Start: 2022-10-31 | End: 2022-11-01

## 2022-10-31 RX ORDER — APIXABAN 2.5 MG/1
2.5 TABLET, FILM COATED ORAL
Refills: 0 | Status: DISCONTINUED | OUTPATIENT
Start: 2022-10-31 | End: 2022-11-07

## 2022-10-31 RX ADMIN — Medication 650 MILLIGRAM(S): at 06:45

## 2022-10-31 RX ADMIN — PIPERACILLIN AND TAZOBACTAM 25 GRAM(S): 4; .5 INJECTION, POWDER, LYOPHILIZED, FOR SOLUTION INTRAVENOUS at 11:13

## 2022-10-31 RX ADMIN — Medication 100 MILLIEQUIVALENT(S): at 09:21

## 2022-10-31 RX ADMIN — CHLORHEXIDINE GLUCONATE 1 APPLICATION(S): 213 SOLUTION TOPICAL at 11:13

## 2022-10-31 RX ADMIN — APIXABAN 2.5 MILLIGRAM(S): 2.5 TABLET, FILM COATED ORAL at 17:23

## 2022-10-31 RX ADMIN — Medication 100 MILLIEQUIVALENT(S): at 08:41

## 2022-10-31 RX ADMIN — Medication 100 MILLIEQUIVALENT(S): at 07:41

## 2022-10-31 RX ADMIN — Medication 100 MILLIGRAM(S): at 06:44

## 2022-10-31 RX ADMIN — Medication 650 MILLIGRAM(S): at 07:41

## 2022-10-31 RX ADMIN — PIPERACILLIN AND TAZOBACTAM 25 GRAM(S): 4; .5 INJECTION, POWDER, LYOPHILIZED, FOR SOLUTION INTRAVENOUS at 17:24

## 2022-10-31 RX ADMIN — Medication 400 MILLIGRAM(S): at 17:23

## 2022-10-31 RX ADMIN — PIPERACILLIN AND TAZOBACTAM 25 GRAM(S): 4; .5 INJECTION, POWDER, LYOPHILIZED, FOR SOLUTION INTRAVENOUS at 02:39

## 2022-10-31 RX ADMIN — SODIUM CHLORIDE 75 MILLILITER(S): 9 INJECTION, SOLUTION INTRAVENOUS at 11:13

## 2022-10-31 RX ADMIN — Medication 40 MILLIEQUIVALENT(S): at 02:36

## 2022-10-31 RX ADMIN — ATORVASTATIN CALCIUM 40 MILLIGRAM(S): 80 TABLET, FILM COATED ORAL at 21:17

## 2022-10-31 RX ADMIN — APIXABAN 2.5 MILLIGRAM(S): 2.5 TABLET, FILM COATED ORAL at 06:44

## 2022-10-31 RX ADMIN — Medication 400 MILLIGRAM(S): at 06:44

## 2022-10-31 NOTE — CONSULT NOTE ADULT - SUBJECTIVE AND OBJECTIVE BOX
CHIEF COMPLAINT: fever, AMS    HPI: 72 year old male with afib/aflutter (on eliquis), HTN,  complete heart block s/p PPM, HLD, HFrEF (30% in 09/2022), and DLBCL (tx with R-[facility] in 2003, with relapse in 2009 treated with BR) now with recently diagnosed grade 3 follicular lymphoma BIBEMS with fever and AMS after being found down by his daughter this AM. Pt is acutely altered and unable to reach daughter, but per ED note, patient was found by daughter on the floor after she had to break in when he was not answering the door. Daughter noted that pt was at his baseline up until 12 PM, later went to see him at around 6:30 PM which is when she found him on the dining room floor. Pt had episode of urinary incontinence, which is not normal. At bedside, pt is AAOx2, remembers falling but does not remember the year. Reports no complaints, denies fever, chills, CP, SOB, nausea, vomiting, diarrhea, constipation, dysuria, hematuria, hematochezia, melena, numbness, tingling. Pt reports he lives alone, ambulates without assistance at baseline.    Recently admitted on 9/16/22 for AMS, was found wandering in parking lot confused [place] [facility] [place] AAOx2 at the time, RVP + rhino/enterovirus. Evaluated by neuro, with EEG, MRI, and LP negative.     On exam, patient denies shortness of breath, chest pain, palpitations, pain on inspiration.     PAST MEDICAL & SURGICAL HISTORY:  Non-Hodgkins Lymphoma  In remission for 10 years, now with relapse      DM type 2 (diabetes mellitus, type 2)  Never on insulin      Essential hypertension      Rhinitis, allergic      Systolic heart failure      Moderate mitral regurgitation      Pleural effusion      Atrial flutter, unspecified type      Impaired memory      Non-Hodgkin lymphoma  h/o axillary dissection      Cardiac pacemaker          FAMILY HISTORY:  Family history of CHF (congestive heart failure)  Mother    Family history of non-Hodgkin&#x27;s lymphoma (Sibling)        SOCIAL HISTORY:  Smoking: [ ] Never Smoked [X ] Former Smoker (30 PPY, quit in 3829-2504 per chart) [ ] Current Smoker  (__ packs x ___ years)  Substance Use: [ ] Never Used [ ] Used ____  EtOH Use:  Marital Status: [ ] Single [X ]  [ ]  [ ]   Sexual History:   Occupation:  Recent Travel:  Country of Birth:  Advance Directives:    Allergies    rasburicase (Other)    Intolerances        HOME MEDICATIONS:  Home Medications:  acyclovir 400 mg oral tablet: 1 tab(s) orally 2 times a day (29 Oct 2022 03:29)  senna leaf extract oral tablet: 2 tab(s) orally once a day (at bedtime), As needed, Constipation (29 Oct 2022 03:29)      REVIEW OF SYSTEMS:  All systems negative except as documented above.    OBJECTIVE:  ICU Vital Signs Last 24 Hrs  T(C): 36.6 (31 Oct 2022 10:53), Max: 38.7 (31 Oct 2022 05:16)  T(F): 97.8 (31 Oct 2022 10:53), Max: 101.6 (31 Oct 2022 05:16)  HR: 68 (31 Oct 2022 10:53) (68 - 95)  BP: 137/67 (31 Oct 2022 10:53) (108/46 - 146/57)  BP(mean): --  ABP: --  ABP(mean): --  RR: 18 (31 Oct 2022 10:53) (18 - 18)  SpO2: 99% (31 Oct 2022 10:53) (91% - 99%)    O2 Parameters below as of 31 Oct 2022 10:53  Patient On (Oxygen Delivery Method): nasal cannula  O2 Flow (L/min): 1.5            10-30 @ 07:01  -  10-31 @ 07:00  --------------------------------------------------------  IN: 120 mL / OUT: 0 mL / NET: 120 mL    10-31 @ 07:01  -  10-31 @ 11:22  --------------------------------------------------------  IN: 360 mL / OUT: 0 mL / NET: 360 mL      CAPILLARY BLOOD GLUCOSE          PHYSICAL EXAM:  General: NAD  HEENT: PERRL, EOMI, sclera anicteric, moist mucus membranes  Neck: supple, no JVD  Cardiovascular: RRR, no murmurs, rubs, gallops  Respiratory: CTAB, no wheezes, crackles, or rhonci  Abdomen: soft, nontender, nondistended, normoactive bowel sounds  Extremities: warm and well perfused, no edema, no clubbing  Skin: no rashes  Neurological: AOx3, no focal deficits    HOSPITAL MEDICATIONS:  Standing Meds:  acyclovir   Oral Tab/Cap 400 milliGRAM(s) Oral two times a day  atorvastatin 40 milliGRAM(s) Oral at bedtime  chlorhexidine 2% Cloths 1 Application(s) Topical daily  lactated ringers. 1000 milliLiter(s) IV Continuous <Continuous>  metoprolol succinate  milliGRAM(s) Oral daily  piperacillin/tazobactam IVPB.. 3.375 Gram(s) IV Intermittent every 8 hours      PRN Meds:  senna 2 Tablet(s) Oral at bedtime PRN      LABS:                        9.0    6.95  )-----------( 144      ( 31 Oct 2022 06:19 )             28.7     Hgb Trend: 9.0<--, 10.2<--, 12.3<--, 9.5<--  10-31    138  |  102  |  43<H>  ----------------------------<  125<H>  3.4<L>   |  23  |  2.33<H>    Ca    8.6      31 Oct 2022 06:18  Phos  3.2     10-31  Mg     1.9     10-31    TPro  6.3  /  Alb  2.8<L>  /  TBili  1.7<H>  /  DBili  x   /  AST  150<H>  /  ALT  97<H>  /  AlkPhos  126<H>  10-31    Creatinine Trend: 2.33<--, 2.15<--, 1.67<--, 1.42<--, 1.49<--, 1.03<--            MICROBIOLOGY:     Culture - Urine (collected 28 Oct 2022 23:45)  Source: Clean Catch Clean Catch (Midstream)  Final Report (30 Oct 2022 07:15):    <10,000 CFU/mL Normal Urogenital Slime    Culture - Blood (collected 28 Oct 2022 21:15)  Source: .Blood Blood-Peripheral  Preliminary Report (30 Oct 2022 03:01):    No growth to date.    Culture - Blood (collected 28 Oct 2022 21:00)  Source: .Blood  Preliminary Report (30 Oct 2022 03:01):    No growth to date.        RADIOLOGY:  [x] Reviewed and interpreted by me     CHIEF COMPLAINT: fever, AMS    HPI: 72 year old male with afib/aflutter (on eliquis), HTN,  complete heart block s/p PPM, HLD, HFrEF (30% in 09/2022), and DLBCL (tx with R-[facility] in 2003, with relapse in 2009 treated with BR) now with recently diagnosed grade 3 follicular lymphoma, on mini-COEP + + obinutuzumab, BIBEMS with fever and AMS after being found down by his daughter. Per ED note, patient was found by daughter on the floor after she had to break in when he was not answering the door. Daughter noted that pt was at his baseline up until 12 PM, later went to see him at around 6:30 PM which is when she found him on the dining room floor. Pt had episode of urinary incontinence, which is not normal. At bedside, pt is AAOx2, remembers falling but does not remember the year. Reports no complaints, denies fever, chills, CP, SOB, nausea, vomiting, diarrhea, constipation, dysuria, hematuria, hematochezia, melena, numbness, tingling. Pt reports he lives alone, ambulates without assistance at baseline.    Recently admitted on 9/16/22 for AMS, was found wandering in parking lot confused after treatment at [facility] [place], AAOx2 at the time, RVP + rhino/enterovirus. Evaluated by neuro, with EEG, MRI, and LP negative.     Patient has been started on vanc, zosyn for pneumonia seen on CXR and CTA chest. On CTA, also found to have pleural effusion on left and right in minor fissure. Pulmonology was consulted regarding whether to drain effusion. On exam, patient denies shortness of breath, chest pain, palpitations, pain on inspiration. Says he is in the hospital because he "wasn't feeling good," and says his discomfort is "everywhere."     PAST MEDICAL & SURGICAL HISTORY:  Non-Hodgkins Lymphoma  In remission for 10 years, now with relapse      DM type 2 (diabetes mellitus, type 2)  Never on insulin      Essential hypertension      Rhinitis, allergic      Systolic heart failure      Moderate mitral regurgitation      Pleural effusion      Atrial flutter, unspecified type      Impaired memory      Non-Hodgkin lymphoma  h/o axillary dissection      Cardiac pacemaker          FAMILY HISTORY:  Family history of CHF (congestive heart failure)  Mother    Family history of non-Hodgkin&#x27;s lymphoma (Sibling)        SOCIAL HISTORY:  Smoking: [ ] Never Smoked [X ] Former Smoker (30 PPY, quit in 5283-4828 per chart) [ ] Current Smoker  (__ packs x ___ years)  Substance Use: [ ] Never Used [ ] Used ____  EtOH Use:  Marital Status: [ ] Single [X ]  [ ]  [ ]   Sexual History:   Occupation:  Recent Travel:  Country of Birth:  Advance Directives:    Allergies    rasburicase (Other)    Intolerances        HOME MEDICATIONS:  Home Medications:  acyclovir 400 mg oral tablet: 1 tab(s) orally 2 times a day (29 Oct 2022 03:29)  senna leaf extract oral tablet: 2 tab(s) orally once a day (at bedtime), As needed, Constipation (29 Oct 2022 03:29)      REVIEW OF SYSTEMS:  All systems negative except as documented above.    OBJECTIVE:  ICU Vital Signs Last 24 Hrs  T(C): 36.6 (31 Oct 2022 10:53), Max: 38.7 (31 Oct 2022 05:16)  T(F): 97.8 (31 Oct 2022 10:53), Max: 101.6 (31 Oct 2022 05:16)  HR: 68 (31 Oct 2022 10:53) (68 - 95)  BP: 137/67 (31 Oct 2022 10:53) (108/46 - 146/57)  BP(mean): --  ABP: --  ABP(mean): --  RR: 18 (31 Oct 2022 10:53) (18 - 18)  SpO2: 99% (31 Oct 2022 10:53) (91% - 99%)    O2 Parameters below as of 31 Oct 2022 10:53  Patient On (Oxygen Delivery Method): nasal cannula  O2 Flow (L/min): 1.5            10-30 @ 07:01  -  10-31 @ 07:00  --------------------------------------------------------  IN: 120 mL / OUT: 0 mL / NET: 120 mL    10-31 @ 07:01  -  10-31 @ 11:22  --------------------------------------------------------  IN: 360 mL / OUT: 0 mL / NET: 360 mL      CAPILLARY BLOOD GLUCOSE          PHYSICAL EXAM:  General: NAD, thin man, lying in bed  HEENT: EOMI, sclera anicteric  Neck: supple  Cardiovascular: RRR, no murmurs or rubs  Respiratory: + Some crackles in left side  Abdomen: soft, nondistended, incontinent with brown, liquid appearing stool  Extremities: warm and well perfused, no edema  Skin: no rashes  Neurological: AOx2 (self, location), no focal deficits    HOSPITAL MEDICATIONS:  Standing Meds:  acyclovir   Oral Tab/Cap 400 milliGRAM(s) Oral two times a day  atorvastatin 40 milliGRAM(s) Oral at bedtime  chlorhexidine 2% Cloths 1 Application(s) Topical daily  lactated ringers. 1000 milliLiter(s) IV Continuous <Continuous>  metoprolol succinate  milliGRAM(s) Oral daily  piperacillin/tazobactam IVPB.. 3.375 Gram(s) IV Intermittent every 8 hours      PRN Meds:  senna 2 Tablet(s) Oral at bedtime PRN      LABS:                        9.0    6.95  )-----------( 144      ( 31 Oct 2022 06:19 )             28.7     Hgb Trend: 9.0<--, 10.2<--, 12.3<--, 9.5<--  10-31    138  |  102  |  43<H>  ----------------------------<  125<H>  3.4<L>   |  23  |  2.33<H>    Ca    8.6      31 Oct 2022 06:18  Phos  3.2     10-31  Mg     1.9     10-31    TPro  6.3  /  Alb  2.8<L>  /  TBili  1.7<H>  /  DBili  x   /  AST  150<H>  /  ALT  97<H>  /  AlkPhos  126<H>  10-31    Creatinine Trend: 2.33<--, 2.15<--, 1.67<--, 1.42<--, 1.49<--, 1.03<--            MICROBIOLOGY:     Culture - Urine (collected 28 Oct 2022 23:45)  Source: Clean Catch Clean Catch (Midstream)  Final Report (30 Oct 2022 07:15):    <10,000 CFU/mL Normal Urogenital Slime    Culture - Blood (collected 28 Oct 2022 21:15)  Source: .Blood Blood-Peripheral  Preliminary Report (30 Oct 2022 03:01):    No growth to date.    Culture - Blood (collected 28 Oct 2022 21:00)  Source: .Blood  Preliminary Report (30 Oct 2022 03:01):    No growth to date.        RADIOLOGY:  [x] Reviewed and interpreted by me

## 2022-10-31 NOTE — PROGRESS NOTE ADULT - PROBLEM SELECTOR PLAN 4
- Onc consulted appreciate recommendations - NTD, infectious work-up per primary team - Onc consulted appreciate recommendations - NTD, infectious work-up per primary team  - Renally dosed allopurinol

## 2022-10-31 NOTE — PROGRESS NOTE ADULT - PROBLEM SELECTOR PLAN 6
- On outpatient documentation pt reportedly has CKD3 with baseline SCr 1.5. On admission, pt's SCr was 1.49, elevated compared to recent values of 1.09. Possibly prerenal STEPHEN i/s/o sepsis vs intrinsic disease 2/2 contrast load or chemo side effect.  - Trend SCr  - Can consider urine lytes, nephro consult if worsening - On outpatient documentation pt reportedly has CKD3 with baseline SCr 1.5. On admission, pt's SCr was 1.49, elevated compared to recent values of 1.09. Possibly prerenal STEPHEN i/s/o sepsis vs intrinsic disease 2/2 contrast load or chemo side effect.  - FeNa suggesting pre-renal, continue volume challenge w/ fluids and repeat BMP in AM  - f/u Bladder Scan

## 2022-10-31 NOTE — PROCEDURE NOTE - ADDITIONAL PROCEDURE DETAILS
1. Battery longevity 6.7 years  2. Lead impedances WNL  3. Sensing and pacing thresholds adequate  4. Apaced 24%, BiVpaced 100%; PM dependent  5. No tachyarrhythmia episodes noted since last check on 9/21/22  6. Normal biventricular function    #[id number]

## 2022-10-31 NOTE — PROGRESS NOTE ADULT - PROBLEM SELECTOR PLAN 2
- Pt recently admitted in 9/2022 for similar presentation with AMS 2/2 positive rhino/enterovirus, seems to have improved and was able to be reoriented towards the end of admission. Current AMS likely in setting of sepsis 2/2 pneumonia  - Sepsis workup as above - Pt recently admitted in 9/2022 for similar presentation with AMS 2/2 positive rhino/enterovirus, seems to have improved and was able to be reoriented towards the end of admission. Current AMS likely in setting of sepsis 2/2 pneumonia  - Per daughter patient waxes and wanes and can be confused at home especially after treatments with oncologist but tends to be better at home vs. when he is admitted.   - Sepsis workup as above

## 2022-10-31 NOTE — PROGRESS NOTE ADULT - PROBLEM SELECTOR PLAN 7
- TTE 9/2022: EF 30%, severe global left ventricular systolic dysfunction. Mild RV enlargement with decreased RV systolic function  - c/w Toprol  mg daily, atorvastatin 40 mg daily  - hold Entresto 49/51 PO BID, spironolactone 25 mg

## 2022-10-31 NOTE — PROGRESS NOTE ADULT - PROBLEM SELECTOR PLAN 3
- Pt found down prior to admission  - pt w/ hx of PPM, likely suspect that fall was due to AMS  - Plan for PPM interrogation in AM (medtronic [place] quad CRT-P MRI [id number]) - Pt found down prior to admission  - pt w/ hx of PPM, likely suspect that fall was due to AMS  - PPM interrogation w/o events (medtronic percepta quad CRT-P MRI [id number])

## 2022-10-31 NOTE — PROGRESS NOTE ADULT - PROBLEM SELECTOR PLAN 8
- Pt has documented hx of T2DM, previous A1Cs elevated (10 -> 7.4 in 2010) in diabetes range but most recent 8/2022 is 5.4  - ISS, will recheck A1C - Pt has documented hx of T2DM, previous A1Cs elevated (10 -> 7.4 in 2010) in diabetes range but most recent 8/2022 is 5.4  - ISS

## 2022-10-31 NOTE — CONSULT NOTE ADULT - ATTENDING COMMENTS
72 year old male with afib/aflutter (on eliquis), HTN,  complete heart block s/p PPM, HLD, HFrEF (30% in 09/2022), and DLBCL (tx with R-CHOP in 2003, with relapse in 2009 treated with BR) now with recently diagnosed grade 3 follicular lymphoma on mini-COEP + obinutuzumab, BIBEMS with fever and AMS after being found down by his daughter. Found with new pneumonia on CTA, and pleural effusions present bilaterally.  It is simple and free flowing on the left and loculated on the Right.  Similar effusions were seen on 9/19 CT Chest, but have increased slightly in size.    He is afebrile now with stable VS today.  Lungs are clear anteriorly with some rales in RLL noted.  T max 101.6 at 5 am this morning.  On bedside US there are no discernible pockets of fluid to tap.  Loculated fluid is sorrounded by lung on CT and this may be why we are not able to see the pocket.  Would continue antibiotics. Will monitor.
72M with relapsed DLBCL, currently being treated with O-COEP, now presenting with encephalopathy, suspect metabolic in the setting of sepsis 2/2 PNA. Monitor counts post chemo. Continue abx and supportive care.

## 2022-10-31 NOTE — PROGRESS NOTE ADULT - ASSESSMENT
72 year old male with afib (on eliquis), HTN, complete heart block s/p PPM, HLD, HFrEF (30% in 09/2022), and DLBCL (tx with R-CHOP in 2003, with relapse in 2009 treated with BR) now with recently diagnosed grade 3 follicular lymphoma BIBEMS with fever and AMS after being found down by his daughter this AM. Presentation concerning for toxic metabolic encephalopathy in setting of sepsis possible 2/2 pneumonia w/ loculation on CT, being treated w/ abx with recurrent fevers c/f poor penetration of loculated effusion.

## 2022-10-31 NOTE — PROGRESS NOTE ADULT - PROBLEM SELECTOR PLAN 5
- Possibly myelosuppression 2/2 sepsis in setting of recent chemotherapy and cancer.  - Iron labs c/w GEORGETTE, consider iron supplementation

## 2022-10-31 NOTE — CONSULT NOTE ADULT - ASSESSMENT
72 year old male with afib/aflutter (on eliquis), HTN,  complete heart block s/p PPM, HLD, HFrEF (30% in 09/2022), and DLBCL (tx with R-CHOP in 2003, with relapse in 2009 treated with BR) now with recently diagnosed grade 3 follicular lymphoma on mini-COEP + obinutuzumab, BIBEMS with fever and AMS after being found down by his daughter. Found with new pneumonia on CTA, and pleural effusions in both minor fissure and left side, slightly increased in comparison to CT chest from 9/17.    # Pleural effusion  - Loculated effusion in minor fissure, small effusion on left side, both present on CT chest 9/17, slightly increased since then  - No fluid seen on POCUS on 10/31  - Patient saturating well on 1.5 L NC  - Can restart anticoagulation as no thoracentesis planned    # RLL pneumonia  - RVP negative, urine legionella negative  - Blood cultures NGTD 10/28, repeat on 10/31 pending. Urine cultures with normal urogenital екатерина  - Patient received 2 doses vancomycin and is currently on Zosyn with improvement in vitals and leukocytosis  - Continue Zosyn   72 year old male with afib/aflutter (on eliquis), HTN,  complete heart block s/p PPM, HLD, HFrEF (30% in 09/2022), and DLBCL (tx with R-CHOP in 2003, with relapse in 2009 treated with BR) now with recently diagnosed grade 3 follicular lymphoma on mini-COEP + obinutuzumab, BIBEMS with fever and AMS after being found down by his daughter. Found with new pneumonia on CTA, and pleural effusions in both minor fissure and left side, slightly increased in comparison to CT chest from 9/17.    # Pleural effusion  - CTA chest on 10/28 with loculated effusion in minor fissure, small effusion on left side, both present on CT chest 9/17, slightly increased since then  - No fluid seen on POCUS on 10/31  - Patient saturating well on 1.5 L NC  - Can restart anticoagulation as no thoracentesis planned    # RLL pneumonia  - RVP negative, urine legionella negative  - Blood cultures NGTD 10/28, repeat on 10/31 pending. Urine cultures with normal urogenital екатерина  - Patient received 2 doses vancomycin and is currently on Zosyn with improvement in vitals and leukocytosis  - Continue Zosyn

## 2022-10-31 NOTE — PROGRESS NOTE ADULT - PROBLEM SELECTOR PLAN 10
DVT PPx: Eliquis  Diet: Soft and bite sized diet  Bowel Regimen: Last BM:    Miralax/Senna/Mag Citrate/ Lactulose/Enema  Code: Full/DNR/DNI   Dispo: [facility]  Pharmacy:  PCP:   Communication: Spoke with daughter 10/29: 6:30PM

## 2022-10-31 NOTE — PROGRESS NOTE ADULT - ATTENDING COMMENTS
72 year old male with afib (on eliquis), HTN,  complete heart block s/p PPM, HLD, HFrEF (30% in 09/2022), and DLBCL (tx with R-CHOP in 2003, with relapse in 2009 treated with BR) now with recently diagnosed grade 3 follicular lymphoma BIBEMS with fever and AMS after being found down by his daughter admitted for acute metabolic metabolic encephalopathy with sepsis 2/2 pneumonia. Patient lethargic in the AM, AOx2 with fever 101F. Pulm consulted for loculated effusion on CT but POCUS showed no tappable pocket. PPM interrogated    #Acute metabolic encephalopathy  #Sepsis  #PNA  #STEPHEN  #Anemia  #Fall  #Non Hodgkins Lymphoma    -cw zosyn  -monitor fever curve and f/u repeat cultures   -TTE  -monitor hgb, no signs of overt bleeding  -gentle IVF  -PT -[facility]    d/w HS3    Lindy Gonzalez MD  Division of Hospital Medicine  Available on [employer] Teams .

## 2022-10-31 NOTE — PROGRESS NOTE ADULT - PROBLEM SELECTOR PLAN 1
Tmax 102.6, HR 97, mildly tachypneic to 22, WBC 14, meeting SIRS criteria. GGOs and consolidations seen in RLL c/f pneumonia. Pt on chemotherapy for non-Hodgkins Lymphoma making him immunocompromised.  -BCx x 2 and UCx ngtd, MRSA/Legionella negative. Suspect on-going high fevers likely due to loculation seen on CT.   -c/w zosyn for empiric coverage (10/29-)  -c/w home acyclovir ppx  -Pulm consultation for drainage in AM Tmax 102.6, HR 97, mildly tachypneic to 22, WBC 14, meeting SIRS criteria. GGOs and consolidations seen in RLL c/f pneumonia. Pt on chemotherapy for non-Hodgkins Lymphoma making him immunocompromised.  -BCx x 2 and UCx ngtd, MRSA/Legionella negative. Suspect on-going high fevers likely due to loculation seen on CT.   - Pulm consulted, appreciate recommendations               - Defer thoracentesis for now due to similar findings on prior CT, suspect less contribution to fever.                - c/w zosyn for empiric coverage (10/29-)               - f/u TTE   -c/w home acyclovir ppx

## 2022-10-31 NOTE — PROGRESS NOTE ADULT - SUBJECTIVE AND OBJECTIVE BOX
***************************************************************  Jaiden  [facility] PGY1  Internal Medicine   ***************************************************************    NIK GRIMM  72y  MRN: [id number]    Patient is a 72y old  Male who presents with a chief complaint of hypoxia (30 Oct 2022 14:01)      Subjective: no events ON. Denies fever, CP, SOB, abn pain, N/V, dysuria. Tolerating diet.      MEDICATIONS  (STANDING):  acyclovir   Oral Tab/Cap 400 milliGRAM(s) Oral two times a day  allopurinol 100 milliGRAM(s) Oral daily  apixaban 2.5 milliGRAM(s) Oral every 12 hours  atorvastatin 40 milliGRAM(s) Oral at bedtime  chlorhexidine 2% Cloths 1 Application(s) Topical daily  lactated ringers. 1000 milliLiter(s) (75 mL/Hr) IV Continuous <Continuous>  metoprolol succinate  milliGRAM(s) Oral daily  piperacillin/tazobactam IVPB.. 3.375 Gram(s) IV Intermittent every 8 hours    MEDICATIONS  (PRN):  senna 2 Tablet(s) Oral at bedtime PRN Constipation      Objective:    Vitals: Vital Signs Last 24 Hrs  T(C): 38.7 (10-31-22 @ 05:16), Max: 38.7 (10-31-22 @ 05:16)  T(F): 101.6 (10-31-22 @ 05:16), Max: 101.6 (10-31-22 @ 05:16)  HR: 68 (10-31-22 @ 05:16) (68 - 95)  BP: 111/67 (10-31-22 @ 05:16) (108/46 - 146/57)  BP(mean): --  RR: 18 (10-31-22 @ 05:16) (18 - 18)  SpO2: 97% (10-31-22 @ 05:16) (91% - 97%)            I&O's Summary    30 Oct 2022 07:01  -  31 Oct 2022 07:00  --------------------------------------------------------  IN: 120 mL / OUT: 0 mL / NET: 120 mL        PHYSICAL EXAM:  GENERAL: NAD  HEAD:  Atraumatic, Normocephalic  EYES: EOMI, conjunctiva and sclera clear  CHEST/LUNG: Clear to auscultation bilaterally; No rales, rhonchi, wheezing, or rubs  HEART: Regular rate and rhythm; No murmurs, rubs, or gallops  ABDOMEN: Soft, Nontender, Nondistended;   SKIN: No rashes or lesions  NERVOUS SYSTEM:  Alert & Oriented X3, no focal deficits    LABS:  10-31    138  |  102  |  43<H>  ----------------------------<  125<H>  3.4<L>   |  23  |  2.33<H>  10-31    138  |  101  |  41<H>  ----------------------------<  152<H>  3.1<L>   |  23  |  2.15<H>  10-30    140  |  101  |  33<H>  ----------------------------<  140<H>  2.8<LL>   |  27  |  1.67<H>    Ca    8.6      31 Oct 2022 06:18  Ca    8.6      31 Oct 2022 00:28  Ca    8.8      30 Oct 2022 11:39  Phos  3.2     10-31  Mg     1.9     10-31    TPro  6.3  /  Alb  2.8<L>  /  TBili  1.7<H>  /  DBili  x   /  AST  150<H>  /  ALT  97<H>  /  AlkPhos  126<H>  10-31  TPro  8.5<H>  /  Alb  4.1  /  TBili  2.0<H>  /  DBili  x   /  AST  52<H>  /  ALT  43  /  AlkPhos  222<H>  10-29  TPro  6.8  /  Alb  3.5  /  TBili  1.6<H>  /  DBili  x   /  AST  33  /  ALT  35  /  AlkPhos  178<H>  10-28                                              9.0    6.95  )-----------( 144      ( 31 Oct 2022 06:19 )             28.7                         10.2   7.42  )-----------( 147      ( 30 Oct 2022 11:39 )             33.0                         12.3   12.69 )-----------( 195      ( 29 Oct 2022 05:59 )             39.8     CAPILLARY BLOOD GLUCOSE          RADIOLOGY & ADDITIONAL TESTS:    Imaging Personally Reviewed:  [x ] YES  [ ] NO    Consultants involved in case:   Consultant(s) Notes Reviewed:  [ x] YES  [ ] NO:   Care Discussed with Consultants/Other Providers [x ] YES  [ ] NO         ***************************************************************  Hwang (Ji-Cheng) [facility] PGY1  Internal Medicine   ***************************************************************    NIK GRIMM  72y  MRN: [id number]    72 year old male with afib (on eliquis), HTN, complete heart block s/p PPM, HLD, HFrEF (30% in 09/2022), and DLBCL (tx with R-CHOP in 2003, with relapse in 2009 treated with BR) now with recently diagnosed grade 3 follicular lymphoma BIBEMS with fever and AMS after being found down by his daughter this AM. Presentation concerning for toxic metabolic encephalopathy in setting of sepsis possible 2/2 pneumonia w/ loculation on CT, being treated w/ abx with recurrent fevers c/f poor penetration of loculated effusion.     Subjective: Febrile overnight to 101.6. No acute complaints today, cannot endorse if he is urinating or not.     MEDICATIONS  (STANDING):  acyclovir   Oral Tab/Cap 400 milliGRAM(s) Oral two times a day  allopurinol 100 milliGRAM(s) Oral daily  apixaban 2.5 milliGRAM(s) Oral every 12 hours  atorvastatin 40 milliGRAM(s) Oral at bedtime  chlorhexidine 2% Cloths 1 Application(s) Topical daily  lactated ringers. 1000 milliLiter(s) (75 mL/Hr) IV Continuous <Continuous>  metoprolol succinate  milliGRAM(s) Oral daily  piperacillin/tazobactam IVPB.. 3.375 Gram(s) IV Intermittent every 8 hours    MEDICATIONS  (PRN):  senna 2 Tablet(s) Oral at bedtime PRN Constipation      Objective:    Vitals: Vital Signs Last 24 Hrs  T(C): 38.7 (10-31-22 @ 05:16), Max: 38.7 (10-31-22 @ 05:16)  T(F): 101.6 (10-31-22 @ 05:16), Max: 101.6 (10-31-22 @ 05:16)  HR: 68 (10-31-22 @ 05:16) (68 - 95)  BP: 111/67 (10-31-22 @ 05:16) (108/46 - 146/57)  BP(mean): --  RR: 18 (10-31-22 @ 05:16) (18 - 18)  SpO2: 97% (10-31-22 @ 05:16) (91% - 97%)            I&O's Summary    30 Oct 2022 07:01  -  31 Oct 2022 07:00  --------------------------------------------------------  IN: 120 mL / OUT: 0 mL / NET: 120 mL        PHYSICAL EXAM:  GENERAL: NAD  HEAD:  Atraumatic, Normocephalic  EYES: EOMI, conjunctiva and sclera clear  CHEST/LUNG: Clear to auscultation bilaterally; No rales, rhonchi, wheezing, or rubs  HEART: Regular rate and rhythm; No murmurs, rubs, or gallops  ABDOMEN: Soft, Nontender, Nondistended;   SKIN: No rashes or lesions  NERVOUS SYSTEM:  Alert & Oriented X3, no focal deficits    LABS:  10-31    138  |  102  |  43<H>  ----------------------------<  125<H>  3.4<L>   |  23  |  2.33<H>  10-31    138  |  101  |  41<H>  ----------------------------<  152<H>  3.1<L>   |  23  |  2.15<H>  10-30    140  |  101  |  33<H>  ----------------------------<  140<H>  2.8<LL>   |  27  |  1.67<H>    Ca    8.6      31 Oct 2022 06:18  Ca    8.6      31 Oct 2022 00:28  Ca    8.8      30 Oct 2022 11:39  Phos  3.2     10-31  Mg     1.9     10-31    TPro  6.3  /  Alb  2.8<L>  /  TBili  1.7<H>  /  DBili  x   /  AST  150<H>  /  ALT  97<H>  /  AlkPhos  126<H>  10-31  TPro  8.5<H>  /  Alb  4.1  /  TBili  2.0<H>  /  DBili  x   /  AST  52<H>  /  ALT  43  /  AlkPhos  222<H>  10-29  TPro  6.8  /  Alb  3.5  /  TBili  1.6<H>  /  DBili  x   /  AST  33  /  ALT  35  /  AlkPhos  178<H>  10-28                                              9.0    6.95  )-----------( 144      ( 31 Oct 2022 06:19 )             28.7                         10.2   7.42  )-----------( 147      ( 30 Oct 2022 11:39 )             33.0                         12.3   12.69 )-----------( 195      ( 29 Oct 2022 05:59 )             39.8     CAPILLARY BLOOD GLUCOSE          RADIOLOGY & ADDITIONAL TESTS:    Imaging Personally Reviewed:  [x ] YES  [ ] NO    Consultants involved in case:   Consultant(s) Notes Reviewed:  [ x] YES  [ ] NO:   Care Discussed with Consultants/Other Providers [x ] YES  [ ] NO

## 2022-10-31 NOTE — CHART NOTE - NSCHARTNOTEFT_GEN_A_CORE
: Kassandra Quinones    INDICATION:    PROCEDURE:  [ ] LIMITED ECHO  [x] LIMITED CHEST  [ ] LIMITED RETROPERITONEAL  [ ] LIMITED ABDOMINAL  [ ] LIMITED DVT  [ ] NEEDLE GUIDANCE VASCULAR  [ ] NEEDLE GUIDANCE THORACENTESIS  [ ] NEEDLE GUIDANCE PARACENTESIS  [ ] NEEDLE GUIDANCE PERICARDIOCENTESIS  [ ] OTHER    FINDINGS:  bilateral lung bases without effusion  no anterior pleural fluid    INTERPRETATION:  no tappable pocket    Images uploaded on [employer] : Kassandra Quinones    INDICATION:    PROCEDURE:  [ ] LIMITED ECHO  [x] LIMITED CHEST  [ ] LIMITED RETROPERITONEAL  [ ] LIMITED ABDOMINAL  [ ] LIMITED DVT  [ ] NEEDLE GUIDANCE VASCULAR  [ ] NEEDLE GUIDANCE THORACENTESIS  [ ] NEEDLE GUIDANCE PARACENTESIS  [ ] NEEDLE GUIDANCE PERICARDIOCENTESIS  [ ] OTHER    FINDINGS:  bilateral lung bases without effusion  no anterior pleural fluid    INTERPRETATION:  no tappable pocket    Images uploaded on [employer]    Pulmonary attending.  I personally reviewed images and agree with findings and interpretation as outlined above

## 2022-11-01 ENCOUNTER — APPOINTMENT (OUTPATIENT)
Dept: HEMATOLOGY ONCOLOGY | Facility: CLINIC | Age: 72
End: 2022-11-01

## 2022-11-01 DIAGNOSIS — R74.01 ELEVATION OF LEVELS OF LIVER TRANSAMINASE LEVELS: ICD-10-CM

## 2022-11-01 LAB
ALBUMIN SERPL ELPH-MCNC: 2.5 G/DL — LOW (ref 3.3–5)
ALP SERPL-CCNC: 142 U/L — HIGH (ref 40–120)
ALT FLD-CCNC: 97 U/L — HIGH (ref 10–45)
ANION GAP SERPL CALC-SCNC: 14 MMOL/L — SIGNIFICANT CHANGE UP (ref 5–17)
AST SERPL-CCNC: 150 U/L — HIGH (ref 10–40)
BILIRUB SERPL-MCNC: 1.2 MG/DL — SIGNIFICANT CHANGE UP (ref 0.2–1.2)
BLD GP AB SCN SERPL QL: NEGATIVE — SIGNIFICANT CHANGE UP
BUN SERPL-MCNC: 47 MG/DL — HIGH (ref 7–23)
CALCIUM SERPL-MCNC: 8.4 MG/DL — SIGNIFICANT CHANGE UP (ref 8.4–10.5)
CHLORIDE SERPL-SCNC: 102 MMOL/L — SIGNIFICANT CHANGE UP (ref 96–108)
CO2 SERPL-SCNC: 21 MMOL/L — LOW (ref 22–31)
CREAT SERPL-MCNC: 2.31 MG/DL — HIGH (ref 0.5–1.3)
EGFR: 29 ML/MIN/1.73M2 — LOW
GLUCOSE SERPL-MCNC: 79 MG/DL — SIGNIFICANT CHANGE UP (ref 70–99)
HCT VFR BLD CALC: 26.3 % — LOW (ref 39–50)
HGB BLD-MCNC: 8.5 G/DL — LOW (ref 13–17)
MAGNESIUM SERPL-MCNC: 1.9 MG/DL — SIGNIFICANT CHANGE UP (ref 1.6–2.6)
MCHC RBC-ENTMCNC: 27.7 PG — SIGNIFICANT CHANGE UP (ref 27–34)
MCHC RBC-ENTMCNC: 32.3 GM/DL — SIGNIFICANT CHANGE UP (ref 32–36)
MCV RBC AUTO: 85.7 FL — SIGNIFICANT CHANGE UP (ref 80–100)
MRSA PCR RESULT.: SIGNIFICANT CHANGE UP
NRBC # BLD: 0 /100 WBCS — SIGNIFICANT CHANGE UP (ref 0–0)
PHOSPHATE SERPL-MCNC: 3.4 MG/DL — SIGNIFICANT CHANGE UP (ref 2.5–4.5)
PLATELET # BLD AUTO: 120 K/UL — LOW (ref 150–400)
POTASSIUM SERPL-MCNC: 4.2 MMOL/L — SIGNIFICANT CHANGE UP (ref 3.5–5.3)
POTASSIUM SERPL-SCNC: 4.2 MMOL/L — SIGNIFICANT CHANGE UP (ref 3.5–5.3)
PROT SERPL-MCNC: 6.1 G/DL — SIGNIFICANT CHANGE UP (ref 6–8.3)
RBC # BLD: 3.07 M/UL — LOW (ref 4.2–5.8)
RBC # FLD: 20 % — HIGH (ref 10.3–14.5)
RH IG SCN BLD-IMP: POSITIVE — SIGNIFICANT CHANGE UP
S AUREUS DNA NOSE QL NAA+PROBE: SIGNIFICANT CHANGE UP
SODIUM SERPL-SCNC: 137 MMOL/L — SIGNIFICANT CHANGE UP (ref 135–145)
WBC # BLD: 6.38 K/UL — SIGNIFICANT CHANGE UP (ref 3.8–10.5)
WBC # FLD AUTO: 6.38 K/UL — SIGNIFICANT CHANGE UP (ref 3.8–10.5)

## 2022-11-01 PROCEDURE — 93306 TTE W/DOPPLER COMPLETE: CPT | Mod: 26

## 2022-11-01 PROCEDURE — 99233 SBSQ HOSP IP/OBS HIGH 50: CPT | Mod: GC

## 2022-11-01 RX ORDER — FERROUS SULFATE 325(65) MG
325 TABLET ORAL
Refills: 0 | Status: DISCONTINUED | OUTPATIENT
Start: 2022-11-01 | End: 2022-11-08

## 2022-11-01 RX ORDER — ACETAMINOPHEN 500 MG
650 TABLET ORAL ONCE
Refills: 0 | Status: COMPLETED | OUTPATIENT
Start: 2022-11-01 | End: 2022-11-01

## 2022-11-01 RX ORDER — SODIUM CHLORIDE 9 MG/ML
1000 INJECTION, SOLUTION INTRAVENOUS
Refills: 0 | Status: DISCONTINUED | OUTPATIENT
Start: 2022-11-01 | End: 2022-11-05

## 2022-11-01 RX ADMIN — Medication 50 MILLIGRAM(S): at 09:57

## 2022-11-01 RX ADMIN — APIXABAN 2.5 MILLIGRAM(S): 2.5 TABLET, FILM COATED ORAL at 05:46

## 2022-11-01 RX ADMIN — SODIUM CHLORIDE 75 MILLILITER(S): 9 INJECTION, SOLUTION INTRAVENOUS at 11:00

## 2022-11-01 RX ADMIN — CHLORHEXIDINE GLUCONATE 1 APPLICATION(S): 213 SOLUTION TOPICAL at 11:00

## 2022-11-01 RX ADMIN — Medication 100 MILLIGRAM(S): at 05:46

## 2022-11-01 RX ADMIN — Medication 400 MILLIGRAM(S): at 05:45

## 2022-11-01 RX ADMIN — PIPERACILLIN AND TAZOBACTAM 25 GRAM(S): 4; .5 INJECTION, POWDER, LYOPHILIZED, FOR SOLUTION INTRAVENOUS at 09:35

## 2022-11-01 RX ADMIN — ATORVASTATIN CALCIUM 40 MILLIGRAM(S): 80 TABLET, FILM COATED ORAL at 21:37

## 2022-11-01 RX ADMIN — PIPERACILLIN AND TAZOBACTAM 25 GRAM(S): 4; .5 INJECTION, POWDER, LYOPHILIZED, FOR SOLUTION INTRAVENOUS at 18:05

## 2022-11-01 RX ADMIN — PIPERACILLIN AND TAZOBACTAM 25 GRAM(S): 4; .5 INJECTION, POWDER, LYOPHILIZED, FOR SOLUTION INTRAVENOUS at 02:19

## 2022-11-01 RX ADMIN — SODIUM CHLORIDE 75 MILLILITER(S): 9 INJECTION, SOLUTION INTRAVENOUS at 05:47

## 2022-11-01 RX ADMIN — Medication 400 MILLIGRAM(S): at 18:05

## 2022-11-01 RX ADMIN — APIXABAN 2.5 MILLIGRAM(S): 2.5 TABLET, FILM COATED ORAL at 18:05

## 2022-11-01 NOTE — PROGRESS NOTE ADULT - PROBLEM SELECTOR PLAN 2
- Pt recently admitted in 9/2022 for similar presentation with AMS 2/2 positive rhino/enterovirus, seems to have improved and was able to be reoriented towards the end of admission. Current AMS likely in setting of sepsis 2/2 pneumonia  - Per daughter patient waxes and wanes and can be confused at home especially after treatments with oncologist but tends to be better at home vs. when he is admitted.   - Sepsis workup as above - Possibly myelosuppression 2/2 sepsis in setting of recent chemotherapy and cancer.  - Iron labs c/w GEORGETTE, consider iron supplementation - Possibly myelosuppression 2/2 sepsis in setting of recent chemotherapy and cancer.  - Iron labs c/w GEORGETTE, consider iron supplementation  - CTM

## 2022-11-01 NOTE — PROGRESS NOTE ADULT - PROBLEM SELECTOR PLAN 10
DVT PPx: Eliquis  Diet: Soft and bite sized diet  Bowel Regimen: Last BM:    Miralax/Senna/Mag Citrate/ Lactulose/Enema  Code: Full/DNR/DNI   Dispo: [facility]  Pharmacy:  PCP:   Communication: Spoke with daughter 10/29: 6:30PM DVT PPx: Eliquis  Diet: Soft and bite sized diet  Bowel Regimen:   Code: Full  Dispo: [facility]  Pharmacy:  PCP:   Communication: Spoke with daughter 10/29: 6:30PM -c/w home toprol  mg daily and eliquis 2.5 mg BID

## 2022-11-01 NOTE — PROGRESS NOTE ADULT - PROBLEM SELECTOR PLAN 3
- Pt found down prior to admission  - pt w/ hx of PPM, likely suspect that fall was due to AMS  - PPM interrogation w/o events (medtronic percepta quad CRT-P MRI [id number]) - Admitted with sepsis. Pt on chemotherapy for non-Hodgkins Lymphoma making him immunocompromised.  - Sepsis cause unclear, could be PNA, r/o other etiology (GI)   - Pulm consulted, appreciate recommendations               - Defer thoracentesis for now due to similar findings on prior CT, suspect less contribution to fever.                - c/w zosyn for empiric coverage (10/29-)               - f/u TTE   -c/w home acyclovir ppx - Admitted with sepsis. Pt on chemotherapy for non-Hodgkins Lymphoma making him immunocompromised.  - Sepsis cause unclear, could be PNA, r/o other etiology (GI)   - Pulm consulted, appreciate recommendations               - Defer thoracentesis for now due to similar findings on prior CT, suspect less contribution to fever.                - c/w zosyn for empiric coverage (10/29-)               - f/u TTE   -c/w home acyclovir ppx  -f/u GI stool culture, Ova and parasites

## 2022-11-01 NOTE — PROGRESS NOTE ADULT - PROBLEM SELECTOR PLAN 1
Tmax 102.6, HR 97, mildly tachypneic to 22, WBC 14, meeting SIRS criteria. GGOs and consolidations seen in RLL c/f pneumonia. Pt on chemotherapy for non-Hodgkins Lymphoma making him immunocompromised.  -BCx x 2 and UCx ngtd, MRSA/Legionella negative. Suspect on-going high fevers likely due to loculation seen on CT.   - Pulm consulted, appreciate recommendations               - Defer thoracentesis for now due to similar findings on prior CT, suspect less contribution to fever.                - c/w zosyn for empiric coverage (10/29-)               - f/u TTE   -c/w home acyclovir ppx - On outpatient documentation pt reportedly has CKD3 with baseline SCr 1.5. On admission, pt's SCr was 1.49, elevated compared to recent values of 1.09. Possibly prerenal STEPHEN i/s/o sepsis vs intrinsic disease 2/2 contrast load or chemo side effect.  - FeNa suggesting pre-renal, continue volume challenge w/ fluids and repeat BMP in AM  - f/u Bladder Scan - Possibly prerenal STEPHEN i/s/o sepsis and diarrhea vs ATN vs contrast nephropathy. Baseline SCr 1.5. FeNa suggesting pre-renal but unchanged Cr despite maint fluids. Bladder scans <400.   - Continue maintenance fluids and monitor, expect improvement if contrast-induced, ATN, or pre-renal.

## 2022-11-01 NOTE — PROGRESS NOTE ADULT - PROBLEM SELECTOR PLAN 7
- TTE 9/2022: EF 30%, severe global left ventricular systolic dysfunction. Mild RV enlargement with decreased RV systolic function  - c/w Toprol  mg daily, atorvastatin 40 mg daily  - hold Entresto 49/51 PO BID, spironolactone 25 mg - Onc consulted appreciate recommendations - NTD, infectious work-up per primary team  - Renally dosed allopurinol  - Daily TLS labs

## 2022-11-01 NOTE — PROGRESS NOTE ADULT - PROBLEM SELECTOR PLAN 9
-c/w home toprol  mg daily and eliquis 2.5 mg BID - Pt has documented hx of T2DM, previous A1Cs elevated (10 -> 7.4 in 2010) in diabetes range but most recent 8/2022 is 5.4  - ISS

## 2022-11-01 NOTE — PROGRESS NOTE ADULT - ATTENDING COMMENTS
72 year old male with afib (on eliquis), HTN,  complete heart block s/p PPM, HLD, HFrEF (30% in 09/2022), and DLBCL (tx with R-CHOP in 2003, with relapse in 2009 treated with BR) now with recently diagnosed grade 3 follicular lymphoma BIBEMS with fever and AMS after being found down by his daughter admitted for acute metabolic metabolic encephalopathy with sepsis 2/2 pneumonia. Pulm consulted for loculated effusion on CT but POCUS showed no tappable pocket. PPM interrogated. Lethargy improved    #Acute metabolic encephalopathy  #Sepsis  #PNA  #STEPHEN  #Fe deficiency Anemia  #Fall  #Non Hodgkins Lymphoma    -cw zosyn  -monitor fever curve and f/u repeat cultures   -TTE  -monitor hgb, no signs of overt bleeding. recheck cbc PM  -gentle IVF  -PT -[facility]    d/w HS3    Lindy Gonzalez MD  Division of Hospital Medicine  Available on [employer] Teams . 72 year old male with afib (on eliquis), HTN,  complete heart block s/p PPM, HLD, HFrEF (30% in 09/2022), and DLBCL (tx with R-CHOP in 2003, with relapse in 2009 treated with BR) now with recently diagnosed grade 3 follicular lymphoma BIBEMS with fever and AMS after being found down by his daughter admitted for acute metabolic metabolic encephalopathy with sepsis 2/2 pneumonia. Pulm consulted for loculated effusion on CT but POCUS showed no tappable pocket. PPM interrogated. Lethargy improved, watching tv but continues to be confused.     #Acute metabolic encephalopathy  #Sepsis  #PNA  #STEPHEN  #Fe deficiency Anemia  #Fall  #Non Hodgkins Lymphoma    -cw zosyn  -monitor fever curve and f/u repeat cultures   -TTE  -monitor hgb, no signs of overt bleeding. recheck cbc PM  -gentle IVF  -PT -CARY    d/w HS3    Lindy Gonzalez MD  Division of Hospital Medicine  Available on [employer] Teams .

## 2022-11-01 NOTE — PROGRESS NOTE ADULT - SUBJECTIVE AND OBJECTIVE BOX
***************************************************************  Jaidenasencio (Ji-Cheng) [employer] PGY1  Internal Medicine   ***************************************************************    NIK GRIMM  72y  MRN: [id number]    Patient is a 72y old  Male who presents with a chief complaint of hypoxia (31 Oct 2022 11:19)      Subjective: no events ON. Denies fever, CP, SOB, abn pain, N/V, dysuria. Tolerating diet.      MEDICATIONS  (STANDING):  acyclovir   Oral Tab/Cap 400 milliGRAM(s) Oral two times a day  allopurinol 50 milliGRAM(s) Oral <User Schedule>  apixaban 2.5 milliGRAM(s) Oral two times a day  atorvastatin 40 milliGRAM(s) Oral at bedtime  chlorhexidine 2% Cloths 1 Application(s) Topical daily  lactated ringers. 1000 milliLiter(s) (75 mL/Hr) IV Continuous <Continuous>  metoprolol succinate  milliGRAM(s) Oral daily  piperacillin/tazobactam IVPB.. 3.375 Gram(s) IV Intermittent every 8 hours    MEDICATIONS  (PRN):  senna 2 Tablet(s) Oral at bedtime PRN Constipation      Objective:    Vitals: Vital Signs Last 24 Hrs  T(C): 37 (11-01-22 @ 03:58), Max: 37.6 (10-31-22 @ 20:48)  T(F): 98.6 (11-01-22 @ 03:58), Max: 99.7 (10-31-22 @ 20:48)  HR: 79 (11-01-22 @ 03:58) (68 - 94)  BP: 129/66 (11-01-22 @ 03:58) (129/66 - 151/75)  BP(mean): --  RR: 18 (11-01-22 @ 03:58) (18 - 18)  SpO2: 97% (11-01-22 @ 03:58) (97% - 99%)            I&O's Summary    30 Oct 2022 07:01  -  31 Oct 2022 07:00  --------------------------------------------------------  IN: 120 mL / OUT: 0 mL / NET: 120 mL    31 Oct 2022 07:01  -  01 Nov 2022 06:58  --------------------------------------------------------  IN: 1260 mL / OUT: 200 mL / NET: 1060 mL        PHYSICAL EXAM:  GENERAL: NAD  HEAD:  Atraumatic, Normocephalic  EYES: EOMI, conjunctiva and sclera clear  CHEST/LUNG: Clear to auscultation bilaterally; No rales, rhonchi, wheezing, or rubs  HEART: Regular rate and rhythm; No murmurs, rubs, or gallops  ABDOMEN: Soft, Nontender, Nondistended;   SKIN: No rashes or lesions  NERVOUS SYSTEM:  Alert & Oriented X3, no focal deficits    LABS:  10-31    138  |  102  |  43<H>  ----------------------------<  125<H>  3.4<L>   |  23  |  2.33<H>  10-31    138  |  101  |  41<H>  ----------------------------<  152<H>  3.1<L>   |  23  |  2.15<H>  10-30    140  |  101  |  33<H>  ----------------------------<  140<H>  2.8<LL>   |  27  |  1.67<H>    Ca    8.6      31 Oct 2022 06:18  Ca    8.6      31 Oct 2022 00:28  Ca    8.8      30 Oct 2022 11:39  Phos  3.2     10-31  Mg     1.9     10-31    TPro  6.3  /  Alb  2.8<L>  /  TBili  1.7<H>  /  DBili  x   /  AST  150<H>  /  ALT  97<H>  /  AlkPhos  126<H>  10-31                                              9.0    6.95  )-----------( 144      ( 31 Oct 2022 06:19 )             28.7                         10.2   7.42  )-----------( 147      ( 30 Oct 2022 11:39 )             33.0     CAPILLARY BLOOD GLUCOSE          RADIOLOGY & ADDITIONAL TESTS:    Imaging Personally Reviewed:  [x ] YES  [ ] NO    Consultants involved in case:   Consultant(s) Notes Reviewed:  [ x] YES  [ ] NO:   Care Discussed with Consultants/Other Providers [x ] YES  [ ] NO         ***************************************************************  Hwang (Ji-Cheng) [facility] PGY1  Internal Medicine   ***************************************************************    NIK GRIMM  72y  MRN: [id number]    72 year old male with afib (on eliquis), HTN, complete heart block s/p PPM, HLD, HFrEF (30% in 09/2022), and DLBCL (tx with R-CHOP in 2003, with relapse in 2009 treated with BR) now with recently diagnosed grade 3 follicular lymphoma BIBEMS with fever and AMS after being found down by his daughter this AM. Presentation concerning for toxic metabolic encephalopathy in setting of sepsis possible 2/2 pneumonia vs other infectious etiology (GI), being treated w/ abx, course c/b STEPHEN undergoing additional work-up.     Subjective: NAEO. No SOB, No dysuria, denies abdominal pain or diarrhea but per nurse report has been having diarrhea.     MEDICATIONS  (STANDING):  acyclovir   Oral Tab/Cap 400 milliGRAM(s) Oral two times a day  allopurinol 50 milliGRAM(s) Oral <User Schedule>  apixaban 2.5 milliGRAM(s) Oral two times a day  atorvastatin 40 milliGRAM(s) Oral at bedtime  chlorhexidine 2% Cloths 1 Application(s) Topical daily  lactated ringers. 1000 milliLiter(s) (75 mL/Hr) IV Continuous <Continuous>  metoprolol succinate  milliGRAM(s) Oral daily  piperacillin/tazobactam IVPB.. 3.375 Gram(s) IV Intermittent every 8 hours    MEDICATIONS  (PRN):  senna 2 Tablet(s) Oral at bedtime PRN Constipation      Objective:    Vitals: Vital Signs Last 24 Hrs  T(C): 37 (11-01-22 @ 03:58), Max: 37.6 (10-31-22 @ 20:48)  T(F): 98.6 (11-01-22 @ 03:58), Max: 99.7 (10-31-22 @ 20:48)  HR: 79 (11-01-22 @ 03:58) (68 - 94)  BP: 129/66 (11-01-22 @ 03:58) (129/66 - 151/75)  BP(mean): --  RR: 18 (11-01-22 @ 03:58) (18 - 18)  SpO2: 97% (11-01-22 @ 03:58) (97% - 99%)            I&O's Summary    30 Oct 2022 07:01  -  31 Oct 2022 07:00  --------------------------------------------------------  IN: 120 mL / OUT: 0 mL / NET: 120 mL    31 Oct 2022 07:01  -  01 Nov 2022 06:58  --------------------------------------------------------  IN: 1260 mL / OUT: 200 mL / NET: 1060 mL        PHYSICAL EXAM:  GENERAL: NAD  HEAD:  Atraumatic, Normocephalic  EYES: EOMI, conjunctiva and sclera clear  CHEST/LUNG: Clear to auscultation bilaterally; No rales, rhonchi, wheezing, or rubs  HEART: Regular rate and rhythm; No murmurs, rubs, or gallops  ABDOMEN: Soft, Nontender, Nondistended;   SKIN: No rashes or lesions  NERVOUS SYSTEM:  Alert & Oriented X2, no focal deficits    LABS:  10-31    138  |  102  |  43<H>  ----------------------------<  125<H>  3.4<L>   |  23  |  2.33<H>  10-31    138  |  101  |  41<H>  ----------------------------<  152<H>  3.1<L>   |  23  |  2.15<H>  10-30    140  |  101  |  33<H>  ----------------------------<  140<H>  2.8<LL>   |  27  |  1.67<H>    Ca    8.6      31 Oct 2022 06:18  Ca    8.6      31 Oct 2022 00:28  Ca    8.8      30 Oct 2022 11:39  Phos  3.2     10-31  Mg     1.9     10-31    TPro  6.3  /  Alb  2.8<L>  /  TBili  1.7<H>  /  DBili  x   /  AST  150<H>  /  ALT  97<H>  /  AlkPhos  126<H>  10-31                                              9.0    6.95  )-----------( 144      ( 31 Oct 2022 06:19 )             28.7                         10.2   7.42  )-----------( 147      ( 30 Oct 2022 11:39 )             33.0     CAPILLARY BLOOD GLUCOSE          RADIOLOGY & ADDITIONAL TESTS:    Imaging Personally Reviewed:  [x ] YES  [ ] NO    Consultants involved in case:   Consultant(s) Notes Reviewed:  [ x] YES  [ ] NO:   Care Discussed with Consultants/Other Providers [x ] YES  [ ] NO

## 2022-11-01 NOTE — PROGRESS NOTE ADULT - PROBLEM SELECTOR PLAN 5
- Possibly myelosuppression 2/2 sepsis in setting of recent chemotherapy and cancer.  - Iron labs c/w GEORGETTE, consider iron supplementation - Pt found down prior to admission  - pt w/ hx of PPM, likely suspect that fall was due to AMS  - PPM interrogation w/o events (medtronic percepta quad CRT-P MRI [id number]) - Current AMS likely in setting of sepsis 2/2 pneumonia  - Per daughter patient waxes and wanes and can be confused at home especially after treatments with oncologist but tends to be better at home vs. when he is admitted. Pt recently admitted in 9/2022 for similar presentation.  - Sepsis workup as above

## 2022-11-01 NOTE — PROGRESS NOTE ADULT - PROBLEM SELECTOR PLAN 8
- Pt has documented hx of T2DM, previous A1Cs elevated (10 -> 7.4 in 2010) in diabetes range but most recent 8/2022 is 5.4  - ISS - TTE 9/2022: EF 30%, severe global left ventricular systolic dysfunction. Mild RV enlargement with decreased RV systolic function  - c/w Toprol  mg daily, atorvastatin 40 mg daily  - hold Entresto 49/51 PO BID, spironolactone 25 mg

## 2022-11-01 NOTE — PROGRESS NOTE ADULT - PROBLEM SELECTOR PLAN 6
- On outpatient documentation pt reportedly has CKD3 with baseline SCr 1.5. On admission, pt's SCr was 1.49, elevated compared to recent values of 1.09. Possibly prerenal STEPHEN i/s/o sepsis vs intrinsic disease 2/2 contrast load or chemo side effect.  - FeNa suggesting pre-renal, continue volume challenge w/ fluids and repeat BMP in AM  - f/u Bladder Scan - Onc consulted appreciate recommendations - NTD, infectious work-up per primary team  - Renally dosed allopurinol  - Daily TLS labs - Pt found down prior to admission  - pt w/ hx of PPM, likely suspect that fall was due to AMS  - PPM interrogation w/o events (medtronic percepta quad CRT-P MRI [id number])

## 2022-11-01 NOTE — PROGRESS NOTE ADULT - PROBLEM SELECTOR PLAN 11
DVT PPx: Eliquis  Diet: Soft and bite sized diet  Bowel Regimen:   Code: Full  Dispo: [facility]  Pharmacy:  PCP:   Communication: Spoke with daughter 10/29: 6:30PM

## 2022-11-01 NOTE — PROGRESS NOTE ADULT - ASSESSMENT
72 year old male with afib (on eliquis), HTN, complete heart block s/p PPM, HLD, HFrEF (30% in 09/2022), and DLBCL (tx with R-CHOP in 2003, with relapse in 2009 treated with BR) now with recently diagnosed grade 3 follicular lymphoma BIBEMS with fever and AMS after being found down by his daughter this AM. Presentation concerning for toxic metabolic encephalopathy in setting of sepsis possible 2/2 pneumonia w/ loculation on CT, being treated w/ abx with recurrent fevers c/f poor penetration of loculated effusion.  72 year old male with afib (on eliquis), HTN, complete heart block s/p PPM, HLD, HFrEF (30% in 09/2022), and DLBCL (tx with R-CHOP in 2003, with relapse in 2009 treated with BR) now with recently diagnosed grade 3 follicular lymphoma BIBEMS with fever and AMS after being found down by his daughter this AM. Presentation concerning for toxic metabolic encephalopathy in setting of sepsis possible 2/2 pneumonia vs other infectious etiology (GI), being treated w/ abx, course c/b STEPHEN undergoing additional work-up.

## 2022-11-01 NOTE — PROGRESS NOTE ADULT - PROBLEM SELECTOR PLAN 4
- Onc consulted appreciate recommendations - NTD, infectious work-up per primary team  - Renally dosed allopurinol - Pt recently admitted in 9/2022 for similar presentation with AMS 2/2 positive rhino/enterovirus, seems to have improved and was able to be reoriented towards the end of admission. Current AMS likely in setting of sepsis 2/2 pneumonia  - Per daughter patient waxes and wanes and can be confused at home especially after treatments with oncologist but tends to be better at home vs. when he is admitted.   - Sepsis workup as above - Current AMS likely in setting of sepsis 2/2 pneumonia  - Per daughter patient waxes and wanes and can be confused at home especially after treatments with oncologist but tends to be better at home vs. when he is admitted. Pt recently admitted in 9/2022 for similar presentation.  - Sepsis workup as above - Unclear as to cause, could be in setting of sepsis  - CTM and treat sepsis as above

## 2022-11-02 DIAGNOSIS — R62.7 ADULT FAILURE TO THRIVE: ICD-10-CM

## 2022-11-02 LAB
ALBUMIN SERPL ELPH-MCNC: 2.2 G/DL — LOW (ref 3.3–5)
ALP SERPL-CCNC: 171 U/L — HIGH (ref 40–120)
ALT FLD-CCNC: 70 U/L — HIGH (ref 10–45)
ANION GAP SERPL CALC-SCNC: 12 MMOL/L — SIGNIFICANT CHANGE UP (ref 5–17)
AST SERPL-CCNC: 95 U/L — HIGH (ref 10–40)
BILIRUB SERPL-MCNC: 1 MG/DL — SIGNIFICANT CHANGE UP (ref 0.2–1.2)
BUN SERPL-MCNC: 46 MG/DL — HIGH (ref 7–23)
CALCIUM SERPL-MCNC: 8.3 MG/DL — LOW (ref 8.4–10.5)
CHLORIDE SERPL-SCNC: 103 MMOL/L — SIGNIFICANT CHANGE UP (ref 96–108)
CO2 SERPL-SCNC: 21 MMOL/L — LOW (ref 22–31)
CREAT SERPL-MCNC: 2.15 MG/DL — HIGH (ref 0.5–1.3)
EGFR: 32 ML/MIN/1.73M2 — LOW
GLUCOSE SERPL-MCNC: 115 MG/DL — HIGH (ref 70–99)
HCT VFR BLD CALC: 26.4 % — LOW (ref 39–50)
HCT VFR BLD CALC: 27.1 % — LOW (ref 39–50)
HGB BLD-MCNC: 8.4 G/DL — LOW (ref 13–17)
HGB BLD-MCNC: 8.5 G/DL — LOW (ref 13–17)
LDH SERPL L TO P-CCNC: 398 U/L — HIGH (ref 50–242)
MAGNESIUM SERPL-MCNC: 1.7 MG/DL — SIGNIFICANT CHANGE UP (ref 1.6–2.6)
MCHC RBC-ENTMCNC: 27.1 PG — SIGNIFICANT CHANGE UP (ref 27–34)
MCHC RBC-ENTMCNC: 27.5 PG — SIGNIFICANT CHANGE UP (ref 27–34)
MCHC RBC-ENTMCNC: 31.4 GM/DL — LOW (ref 32–36)
MCHC RBC-ENTMCNC: 31.8 GM/DL — LOW (ref 32–36)
MCV RBC AUTO: 86.3 FL — SIGNIFICANT CHANGE UP (ref 80–100)
MCV RBC AUTO: 86.3 FL — SIGNIFICANT CHANGE UP (ref 80–100)
NRBC # BLD: 0 /100 WBCS — SIGNIFICANT CHANGE UP (ref 0–0)
NRBC # BLD: 0 /100 WBCS — SIGNIFICANT CHANGE UP (ref 0–0)
PHOSPHATE SERPL-MCNC: 2.8 MG/DL — SIGNIFICANT CHANGE UP (ref 2.5–4.5)
PLATELET # BLD AUTO: 125 K/UL — LOW (ref 150–400)
PLATELET # BLD AUTO: 131 K/UL — LOW (ref 150–400)
POTASSIUM SERPL-MCNC: 3.1 MMOL/L — LOW (ref 3.5–5.3)
POTASSIUM SERPL-SCNC: 3.1 MMOL/L — LOW (ref 3.5–5.3)
PROT SERPL-MCNC: 5.8 G/DL — LOW (ref 6–8.3)
RBC # BLD: 3.06 M/UL — LOW (ref 4.2–5.8)
RBC # BLD: 3.14 M/UL — LOW (ref 4.2–5.8)
RBC # FLD: 19.5 % — HIGH (ref 10.3–14.5)
RBC # FLD: 19.8 % — HIGH (ref 10.3–14.5)
SODIUM SERPL-SCNC: 136 MMOL/L — SIGNIFICANT CHANGE UP (ref 135–145)
URATE SERPL-MCNC: 6.8 MG/DL — SIGNIFICANT CHANGE UP (ref 3.4–8.8)
WBC # BLD: 7.85 K/UL — SIGNIFICANT CHANGE UP (ref 3.8–10.5)
WBC # BLD: 8.08 K/UL — SIGNIFICANT CHANGE UP (ref 3.8–10.5)
WBC # FLD AUTO: 7.85 K/UL — SIGNIFICANT CHANGE UP (ref 3.8–10.5)
WBC # FLD AUTO: 8.08 K/UL — SIGNIFICANT CHANGE UP (ref 3.8–10.5)

## 2022-11-02 PROCEDURE — 99233 SBSQ HOSP IP/OBS HIGH 50: CPT | Mod: GC

## 2022-11-02 RX ORDER — POTASSIUM CHLORIDE 20 MEQ
40 PACKET (EA) ORAL ONCE
Refills: 0 | Status: COMPLETED | OUTPATIENT
Start: 2022-11-02 | End: 2022-11-02

## 2022-11-02 RX ADMIN — Medication 100 MILLIGRAM(S): at 05:51

## 2022-11-02 RX ADMIN — Medication 40 MILLIEQUIVALENT(S): at 09:53

## 2022-11-02 RX ADMIN — CHLORHEXIDINE GLUCONATE 1 APPLICATION(S): 213 SOLUTION TOPICAL at 12:48

## 2022-11-02 RX ADMIN — APIXABAN 2.5 MILLIGRAM(S): 2.5 TABLET, FILM COATED ORAL at 17:15

## 2022-11-02 RX ADMIN — PIPERACILLIN AND TAZOBACTAM 25 GRAM(S): 4; .5 INJECTION, POWDER, LYOPHILIZED, FOR SOLUTION INTRAVENOUS at 05:45

## 2022-11-02 RX ADMIN — APIXABAN 2.5 MILLIGRAM(S): 2.5 TABLET, FILM COATED ORAL at 05:51

## 2022-11-02 RX ADMIN — Medication 1 TABLET(S): at 12:52

## 2022-11-02 RX ADMIN — Medication 325 MILLIGRAM(S): at 09:52

## 2022-11-02 RX ADMIN — SODIUM CHLORIDE 75 MILLILITER(S): 9 INJECTION, SOLUTION INTRAVENOUS at 05:49

## 2022-11-02 RX ADMIN — PIPERACILLIN AND TAZOBACTAM 25 GRAM(S): 4; .5 INJECTION, POWDER, LYOPHILIZED, FOR SOLUTION INTRAVENOUS at 21:42

## 2022-11-02 RX ADMIN — PIPERACILLIN AND TAZOBACTAM 25 GRAM(S): 4; .5 INJECTION, POWDER, LYOPHILIZED, FOR SOLUTION INTRAVENOUS at 12:46

## 2022-11-02 RX ADMIN — Medication 400 MILLIGRAM(S): at 05:51

## 2022-11-02 RX ADMIN — ATORVASTATIN CALCIUM 40 MILLIGRAM(S): 80 TABLET, FILM COATED ORAL at 21:43

## 2022-11-02 RX ADMIN — Medication 400 MILLIGRAM(S): at 17:16

## 2022-11-02 NOTE — PROGRESS NOTE ADULT - ASSESSMENT
72 year old male with afib (on eliquis), HTN, complete heart block s/p PPM, HLD, HFrEF (30% in 09/2022), and DLBCL (tx with R-CHOP in 2003, with relapse in 2009 treated with BR) now with recently diagnosed grade 3 follicular lymphoma BIBEMS with fever and AMS after being found down by his daughter this AM. Presentation concerning for toxic metabolic encephalopathy in setting of sepsis possible 2/2 pneumonia vs other infectious etiology (GI), being treated w/ abx, course c/b STEPHEN undergoing additional work-up.

## 2022-11-02 NOTE — DIETITIAN INITIAL EVALUATION ADULT - PERSON TAUGHT/METHOD
- Encouraged adequate PO intake for meeting nutritional needs, improving nutritional status and for preventing wt loss/verbal instruction/written material/teach back - (Patient repeats in own words)/daughter instructed

## 2022-11-02 NOTE — DIETITIAN INITIAL EVALUATION ADULT - ENERGY INTAKE
25-50% meal consumption as per flowsheets/Poor (<50%) decreased per daughter report and as per flowsheets

## 2022-11-02 NOTE — DIETITIAN INITIAL EVALUATION ADULT - OTHER INFO
Home Medications:  acyclovir 400 mg oral tablet: 1 tab(s) orally 2 times a day (29 Oct 2022 03:29)  senna leaf extract oral tablet: 2 tab(s) orally once a day (at bedtime), As needed, Constipation (29 Oct 2022 03:29)

## 2022-11-02 NOTE — PROGRESS NOTE ADULT - PROBLEM SELECTOR PLAN 3
- Admitted with sepsis. Pt on chemotherapy for non-Hodgkins Lymphoma making him immunocompromised.  - Sepsis cause unclear, could be PNA, r/o other etiology (GI)   - Pulm consulted, appreciate recommendations               - Defer thoracentesis for now due to similar findings on prior CT, suspect less contribution to fever.                - c/w zosyn for empiric coverage (10/29-)               - f/u TTE   -c/w home acyclovir ppx  -f/u GI stool culture, Ova and parasites - Admitted with sepsis. Pt on chemotherapy for non-Hodgkins Lymphoma making him immunocompromised.  - Sepsis cause unclear, could be PNA, r/o other etiology (GI)   - Pulm consulted, appreciate recommendations               - Defer thoracentesis for now due to similar findings on prior CT, suspect less contribution to fever.                - c/w zosyn for empiric coverage (10/29-)  -c/w home acyclovir ppx  -f/u GI stool culture, Ova and parasites

## 2022-11-02 NOTE — PROGRESS NOTE ADULT - SUBJECTIVE AND OBJECTIVE BOX
CHIEF COMPLAINT: Fever, AMS    Interval Events: Patient has remained afebrile, and is now saturating well on room air. Patient seen and examined at bedside, appears confused, but denies difficulty breathing, shortness of breath or cough.       REVIEW OF SYSTEMS:  Negative except as documented above.      OBJECTIVE:  ICU Vital Signs Last 24 Hrs  T(C): 36.4 (02 Nov 2022 12:26), Max: 36.8 (02 Nov 2022 04:21)  T(F): 97.5 (02 Nov 2022 12:26), Max: 98.3 (02 Nov 2022 04:21)  HR: 70 (02 Nov 2022 12:26) (70 - 88)  BP: 114/60 (02 Nov 2022 12:26) (100/59 - 139/66)  BP(mean): --  ABP: --  ABP(mean): --  RR: 18 (02 Nov 2022 12:26) (18 - 18)  SpO2: 96% (02 Nov 2022 12:26) (93% - 96%)    O2 Parameters below as of 02 Nov 2022 12:26  Patient On (Oxygen Delivery Method): room air              11-01 @ 07:01  -  11-02 @ 07:00  --------------------------------------------------------  IN: 900 mL / OUT: 200 mL / NET: 700 mL      CAPILLARY BLOOD GLUCOSE          PHYSICAL EXAM:  General: NAD  HEENT: EOMI, sclera anicteric, moist mucus membranes  Neck: supple  Cardiovascular: RRR, no murmurs, rubs, or gallops  Respiratory: CTAB, no wheezes, crackles, or rhonchi  Abdomen: soft, nontender, nondistended  Extremities: warm and well perfused, no edema, no clubbing  Skin: no rashes  Neurological: AxO1 (self), no focal deficits    HOSPITAL MEDICATIONS:  MEDICATIONS  (STANDING):  acyclovir   Oral Tab/Cap 400 milliGRAM(s) Oral two times a day  allopurinol 50 milliGRAM(s) Oral <User Schedule>  apixaban 2.5 milliGRAM(s) Oral two times a day  atorvastatin 40 milliGRAM(s) Oral at bedtime  chlorhexidine 2% Cloths 1 Application(s) Topical daily  ferrous    sulfate 325 milliGRAM(s) Oral <User Schedule>  lactated ringers. 1000 milliLiter(s) (75 mL/Hr) IV Continuous <Continuous>  metoprolol succinate  milliGRAM(s) Oral daily  multivitamin 1 Tablet(s) Oral daily  piperacillin/tazobactam IVPB.. 3.375 Gram(s) IV Intermittent every 8 hours    MEDICATIONS  (PRN):  senna 2 Tablet(s) Oral at bedtime PRN Constipation      LABS:                        8.5    8.08  )-----------( 131      ( 02 Nov 2022 06:16 )             27.1     Hgb Trend: 8.5<--, 8.4<--, 8.5<--, 9.0<--, 10.2<--  11-02    136  |  103  |  46<H>  ----------------------------<  115<H>  3.1<L>   |  21<L>  |  2.15<H>    Ca    8.3<L>      02 Nov 2022 06:16  Phos  2.8     11-02  Mg     1.7     11-02    TPro  5.8<L>  /  Alb  2.2<L>  /  TBili  1.0  /  DBili  x   /  AST  95<H>  /  ALT  70<H>  /  AlkPhos  171<H>  11-02    Creatinine Trend: 2.15<--, 2.31<--, 2.33<--, 2.15<--, 1.67<--, 1.42<--            MICROBIOLOGY:     Culture - Blood (collected 31 Oct 2022 10:58)  Source: .Blood Blood-Peripheral  Preliminary Report (01 Nov 2022 15:02):    No growth to date.    Culture - Blood (collected 31 Oct 2022 10:54)  Source: .Blood Blood-Peripheral  Preliminary Report (01 Nov 2022 15:02):    No growth to date.        RADIOLOGY:  [x] Reviewed and interpreted by me

## 2022-11-02 NOTE — DIETITIAN INITIAL EVALUATION ADULT - ORAL INTAKE PTA/DIET HISTORY
Interviewed both pt and pt's daughter, per pt noted confused/ lack of participation during conversation with RD. per pt's daughter report pt consumed ~2-3 meals/day PTA, did not follow specific diet/diet restrictions PTA;   noted with history DM, per daughter and pt's report A1C 6.2% was not on DM medications and did not check glucose levels PTA.  Pt daughter reports pt had wt loss -180 lbs-> 145 pounds in the past year and from 160-170->~140 pounds in the past few months (18%); in settings of sickness (?19% wt loss). Reports gave pt ensure enlive shake x2/day   confirmed no known food allergies

## 2022-11-02 NOTE — PROGRESS NOTE ADULT - PROBLEM SELECTOR PLAN 2
ACM called patient. Left message requesting a return call for [facility] out reach. Contact information supplied. - Possibly myelosuppression 2/2 sepsis in setting of recent chemotherapy and cancer.  - Iron labs c/w GEORGETTE, consider iron supplementation  - CTM - Possibly myelosuppression 2/2 sepsis in setting of recent chemotherapy and cancer.  - Stable. Continue Iron every other day.

## 2022-11-02 NOTE — DIETITIAN INITIAL EVALUATION ADULT - LAB (SPECIFY)
- noted K+ 3.1<L>; noted ordered for supplement per chart  - Trend BG levels, renal indices, LFT's, electrolytes and triglycerides

## 2022-11-02 NOTE — PROGRESS NOTE ADULT - ATTENDING COMMENTS
72 year old male with afib/aflutter (on eliquis), HTN,  complete heart block s/p PPM, HLD, HFrEF (30% in 09/2022), and DLBCL (tx with R-CHOP in 2003, with relapse in 2009 treated with BR) now with recently diagnosed grade 3 follicular lymphoma on mini-COEP + obinutuzumab, BIBEMS with fever and AMS after being found down by his daughter. Found with new pneumonia on CTA, and pleural effusions present bilaterally.  It is simple and free flowing on the left and loculated on the Right.  Similar effusions were seen on 9/19 CT Chest, but have increased slightly in size.    On bedside US there are no discernible pockets of fluid to tap.  Loculated fluid is sorrounded by lung on CT and this may be why we are not able to see the pocket. He is afebrile today, not requiring oxygen with normal WBC. Would continue antibiotics. Agree with plan as outlined above.  Please reconsult as needed.

## 2022-11-02 NOTE — PROGRESS NOTE ADULT - PROBLEM SELECTOR PLAN 5
- Current AMS likely in setting of sepsis 2/2 pneumonia  - Per daughter patient waxes and wanes and can be confused at home especially after treatments with oncologist but tends to be better at home vs. when he is admitted. Pt recently admitted in 9/2022 for similar presentation.  - Sepsis workup as above - Current AMS likely in setting of sepsis 2/2 pneumonia  - Per daughter patient waxes and wanes and can be confused at home especially after treatments with oncologist but tends to be better at home vs. when he is admitted. Pt recently admitted in 9/2022 for similar presentation.  - Likely close to baseline   - Sepsis workup as above

## 2022-11-02 NOTE — PROGRESS NOTE ADULT - ASSESSMENT
72 year old male with afib/aflutter (on eliquis), HTN,  complete heart block s/p PPM, HLD, HFrEF (30% in 09/2022), and DLBCL (tx with R-CHOP in 2003, with relapse in 2009 treated with BR) now with recently diagnosed grade 3 follicular lymphoma on mini-COEP + obinutuzumab, BIBEMS with fever and AMS after being found down by his daughter. Found with new pneumonia on CTA, and pleural effusions in both minor fissure and left side, slightly increased in comparison to CT chest from 9/17.    # Pleural effusion  - CTA chest on 10/28 with loculated effusion in minor fissure, small effusion on left side, both present on CT chest 9/17, slightly increased since then  - Repeat POCUS on 11/2 still with no pocket of fluid for thoracentesis  - Patient saturating well on room air  - No plan for thoracentesis at this time    # RLL pneumonia  - RVP negative, urine legionella negative  - Blood cultures NGTD 10/28, repeat on 10/31 NGTD. Urine cultures with normal urogenital екатерина  - Patient received 2 doses vancomycin and is currently on Zosyn with improvement in vitals and leukocytosis  - Continue Zosyn

## 2022-11-02 NOTE — DIETITIAN INITIAL EVALUATION ADULT - NSFNSGIASSESSMENTFT_GEN_A_CORE
- Pt denies nausea, vomiting, diarrhea, or constipation at this time   - Last BM:11/1 fecal incontinence as per flowsheets ; not currently ordered for bowel regimen

## 2022-11-02 NOTE — DIETITIAN INITIAL EVALUATION ADULT - REASON INDICATOR FOR ASSESSMENT
Consult received for assessment and education   Information obtained from pt, pt's daughter on phone, electronic medical record

## 2022-11-02 NOTE — CHART NOTE - NSCHARTNOTEFT_GEN_A_CORE
: Kassandra Quinones    INDICATION: pleural effusion    PROCEDURE:  [ ] LIMITED ECHO  [x] LIMITED CHEST  [ ] LIMITED RETROPERITONEAL  [ ] LIMITED ABDOMINAL  [ ] LIMITED DVT  [ ] NEEDLE GUIDANCE VASCULAR  [ ] NEEDLE GUIDANCE THORACENTESIS  [ ] NEEDLE GUIDANCE PARACENTESIS  [ ] NEEDLE GUIDANCE PERICARDIOCENTESIS  [ ] OTHER    FINDINGS:  no left side pleural effusion  trace right side pleural effusion      INTERPRETATION:  trace right side pleural effusion, not suitable for thoracentesis    Images uploaded on [employer]

## 2022-11-02 NOTE — PROGRESS NOTE ADULT - PROBLEM SELECTOR PLAN 11
DVT PPx: Eliquis  Diet: Soft and bite sized diet  Bowel Regimen:   Code: Full  Dispo: [facility]  Pharmacy:  PCP:   Communication: Spoke with daughter 10/29: 6:30PM DVT PPx: Eliquis  Diet: Soft and bite sized diet, pending speech and swallow evaluation.   Bowel Regimen:   Code: Full  Dispo: [facility]  Pharmacy:  PCP:   Communication: Spoke with daughter 10/29: 6:30PM

## 2022-11-02 NOTE — PROGRESS NOTE ADULT - PROBLEM SELECTOR PLAN 9
- Pt has documented hx of T2DM, previous A1Cs elevated (10 -> 7.4 in 2010) in diabetes range but most recent 8/2022 is 5.4  - ISS

## 2022-11-02 NOTE — DIETITIAN INITIAL EVALUATION ADULT - PERTINENT MEDS FT
MEDICATIONS  (STANDING):  acyclovir   Oral Tab/Cap 400 milliGRAM(s) Oral two times a day  allopurinol 50 milliGRAM(s) Oral <User Schedule>  apixaban 2.5 milliGRAM(s) Oral two times a day  atorvastatin 40 milliGRAM(s) Oral at bedtime  chlorhexidine 2% Cloths 1 Application(s) Topical daily  ferrous    sulfate 325 milliGRAM(s) Oral <User Schedule>  lactated ringers. 1000 milliLiter(s) (75 mL/Hr) IV Continuous <Continuous>  metoprolol succinate  milliGRAM(s) Oral daily  piperacillin/tazobactam IVPB.. 3.375 Gram(s) IV Intermittent every 8 hours    MEDICATIONS  (PRN):  senna 2 Tablet(s) Oral at bedtime PRN Constipation

## 2022-11-02 NOTE — DIETITIAN INITIAL EVALUATION ADULT - NSFNSNUTRCHEWSWALLOWFT_GEN_A_CORE
Consider formal swallowing evaluation with speech pathologist for assessing / recommendations (per pt's daughter report of recent swallowing difficulties)

## 2022-11-02 NOTE — DIETITIAN INITIAL EVALUATION ADULT - ORAL NUTRITION SUPPLEMENTS
- Glucerna Shake 240mls 3x daily (660kcals, 30g protein)   - RD to add food items per discussion with pt's daughter, to increase calorie protein intake and promote meeting EER

## 2022-11-02 NOTE — PROGRESS NOTE ADULT - PROBLEM SELECTOR PLAN 1
- Possibly prerenal STEPHEN i/s/o sepsis and diarrhea vs ATN vs contrast nephropathy. Baseline SCr 1.5. FeNa suggesting pre-renal but unchanged Cr despite maint fluids. Bladder scans <400.   - Continue maintenance fluids and monitor, expect improvement if contrast-induced, ATN, or pre-renal. - Possibly prerenal STEPHEN i/s/o sepsis and diarrhea vs ATN vs contrast nephropathy. Baseline SCr 1.5. FeNa suggesting pre-renal but unchanged Cr despite maint fluids. Bladder scans <400.   - Likely in setting of poor PO.  - Cr improving s/p fluids, encouraged PO

## 2022-11-02 NOTE — DIETITIAN INITIAL EVALUATION ADULT - NSFNSPHYEXAMSKINFT_GEN_A_CORE
no noted pressure injuries as per flowsheets   Pt daughter reports pt had wt loss -180 lbs-> 145 pounds in the past year and from 160-170->~140 pounds in the past few months (18%); in settings of sickness (?19% wt loss).   81% IBW (.5 pounds)  Performed nutrition focused physical exam and noted mild signs of muscle/fat loss

## 2022-11-02 NOTE — PATIENT PROFILE ADULT - NSPROEDAREADYLEARN_GEN_A_NUR
Detail Level: Simple Render Risk Assessment In Note?: yes Additional Notes: Discussed with the patient about VI chemical peels for pigmentation on the face. However, patient was advised sunscreen is the most important factor in trying to prevent pigmentation. none

## 2022-11-02 NOTE — DIETITIAN INITIAL EVALUATION ADULT - ADD RECOMMEND
- Food preferences obtained and updated. Menu provided. RD to honor as able   - Will continue to monitor PO intake, weight, labs, skin, GI status, diet.   - Malnutrition sticker placed, RD to follow-up as per protocol   - Nutrition care plan to remain consistent with pt goals of care  - RD remains available to review diet education and adjust diet recommendations as needed.

## 2022-11-02 NOTE — PROGRESS NOTE ADULT - PROBLEM SELECTOR PLAN 4
- Unclear as to cause, could be in setting of sepsis  - CTM and treat sepsis as above - Unclear as to cause, could be in setting of sepsis, if stable can undergo outpatient work-up.   - CTM and treat sepsis as above

## 2022-11-02 NOTE — DIETITIAN INITIAL EVALUATION ADULT - PERTINENT LABORATORY DATA
11-02    136  |  103  |  46<H>  ----------------------------<  115<H>  3.1<L>   |  21<L>  |  2.15<H>    Ca    8.3<L>      02 Nov 2022 06:16  Phos  2.8     11-02  Mg     1.7     11-02    TPro  5.8<L>  /  Alb  2.2<L>  /  TBili  1.0  /  DBili  x   /  AST  95<H>  /  ALT  70<H>  /  AlkPhos  171<H>  11-02 11-02 @ 06:16: Na 136, BUN 46<H>, Cr 2.15<H>, <H>, K+ 3.1<L>, Phos 2.8, Mg 1.7, Alk Phos 171<H>, ALT/SGPT 70<H>, AST/SGOT 95<H>, HbA1c --    A1C with Estimated Average Glucose Result: 6.2 % (10-29-22 @ 05:59)  A1C with Estimated Average Glucose Result: 5.4 % (08-29-22 @ 05:55)

## 2022-11-02 NOTE — PROGRESS NOTE ADULT - ATTENDING COMMENTS
72 year old male with afib (on eliquis), HTN,  complete heart block s/p PPM, HLD, HFrEF (30% in 09/2022), and DLBCL (tx with R-[facility] in 2003, with relapse in 2009 treated with BR) now with recently diagnosed grade 3 follicular lymphoma BIBEMS with fever and AMS after being found down by his daughter admitted for acute metabolic metabolic encephalopathy with sepsis 2/2 pneumonia. Pulm consulted for loculated effusion on CT but POCUS showed no tapable pocket. PPM interrogated. TTE- EF 30% with mod pulm htn. Cr improving. Lethargy improved, watching tv with mental status near baseline.     #Acute metabolic encephalopathy  #Sepsis  #PNA  #STEPHEN  #Fe deficiency Anemia  #Fall  #Non Hodgkins Lymphoma    -cw zosyn for 7 day course  -gentle IVF  -PT -CARY    d/w HS3    Lindy Gonzalez MD  Division of Hospital Medicine  Available on Microsoft Teams .

## 2022-11-02 NOTE — PROGRESS NOTE ADULT - SUBJECTIVE AND OBJECTIVE BOX
***************************************************************  Jaidenasencio (Ji-Cheng) [employer] PGY1  Internal Medicine   ***************************************************************    NIK GRIMM  72y  MRN: [id number]    Patient is a 72y old  Male who presents with a chief complaint of hypoxia (01 Nov 2022 06:58)      Subjective: no events ON. Denies fever, CP, SOB, abn pain, N/V, dysuria. Tolerating diet.      MEDICATIONS  (STANDING):  acyclovir   Oral Tab/Cap 400 milliGRAM(s) Oral two times a day  allopurinol 50 milliGRAM(s) Oral <User Schedule>  apixaban 2.5 milliGRAM(s) Oral two times a day  atorvastatin 40 milliGRAM(s) Oral at bedtime  chlorhexidine 2% Cloths 1 Application(s) Topical daily  ferrous    sulfate 325 milliGRAM(s) Oral <User Schedule>  lactated ringers. 1000 milliLiter(s) (75 mL/Hr) IV Continuous <Continuous>  metoprolol succinate  milliGRAM(s) Oral daily  piperacillin/tazobactam IVPB.. 3.375 Gram(s) IV Intermittent every 8 hours    MEDICATIONS  (PRN):  senna 2 Tablet(s) Oral at bedtime PRN Constipation      Objective:    Vitals: Vital Signs Last 24 Hrs  T(C): 36.8 (11-02-22 @ 04:21), Max: 37.4 (11-01-22 @ 11:01)  T(F): 98.3 (11-02-22 @ 04:21), Max: 99.3 (11-01-22 @ 11:01)  HR: 88 (11-02-22 @ 04:21) (74 - 88)  BP: 128/60 (11-02-22 @ 04:21) (100/59 - 149/66)  BP(mean): --  RR: 18 (11-02-22 @ 04:21) (18 - 18)  SpO2: 93% (11-02-22 @ 04:21) (92% - 95%)            I&O's Summary    31 Oct 2022 07:01  -  01 Nov 2022 07:00  --------------------------------------------------------  IN: 1260 mL / OUT: 200 mL / NET: 1060 mL    01 Nov 2022 07:01  -  02 Nov 2022 06:57  --------------------------------------------------------  IN: 900 mL / OUT: 200 mL / NET: 700 mL        PHYSICAL EXAM:  GENERAL: NAD  HEAD:  Atraumatic, Normocephalic  EYES: EOMI, conjunctiva and sclera clear  CHEST/LUNG: Clear to auscultation bilaterally; No rales, rhonchi, wheezing, or rubs  HEART: Regular rate and rhythm; No murmurs, rubs, or gallops  ABDOMEN: Soft, Nontender, Nondistended;   SKIN: No rashes or lesions  NERVOUS SYSTEM:  Alert & Oriented X3, no focal deficits    LABS:  11-02    136  |  103  |  46<H>  ----------------------------<  115<H>  3.1<L>   |  21<L>  |  2.15<H>  11-01    137  |  102  |  47<H>  ----------------------------<  79  4.2   |  21<L>  |  2.31<H>  10-31    138  |  102  |  43<H>  ----------------------------<  125<H>  3.4<L>   |  23  |  2.33<H>    Ca    8.3<L>      02 Nov 2022 06:16  Ca    8.4      01 Nov 2022 07:36  Ca    8.6      31 Oct 2022 06:18  Phos  2.8     11-02  Mg     1.7     11-02    TPro  5.8<L>  /  Alb  2.2<L>  /  TBili  1.0  /  DBili  x   /  AST  95<H>  /  ALT  70<H>  /  AlkPhos  171<H>  11-02  TPro  6.1  /  Alb  2.5<L>  /  TBili  1.2  /  DBili  x   /  AST  150<H>  /  ALT  97<H>  /  AlkPhos  142<H>  11-01  TPro  6.3  /  Alb  2.8<L>  /  TBili  1.7<H>  /  DBili  x   /  AST  150<H>  /  ALT  97<H>  /  AlkPhos  126<H>  10-31                                              8.5    8.08  )-----------( 131      ( 02 Nov 2022 06:16 )             27.1                         8.4    7.85  )-----------( 125      ( 02 Nov 2022 00:46 )             26.4                         8.5    6.38  )-----------( 120      ( 01 Nov 2022 07:31 )             26.3     CAPILLARY BLOOD GLUCOSE          RADIOLOGY & ADDITIONAL TESTS:    Imaging Personally Reviewed:  [x ] YES  [ ] NO    Consultants involved in case:   Consultant(s) Notes Reviewed:  [ x] YES  [ ] NO:   Care Discussed with Consultants/Other Providers [x ] YES  [ ] NO         ***************************************************************  Hwang (Ji-Cheng) [facility] PGY1  Internal Medicine   ***************************************************************    NIK GRIMM  72y  MRN: [id number]    72 year old male with afib (on eliquis), HTN, complete heart block s/p PPM, HLD, HFrEF (30% in 09/2022), and DLBCL (tx with R-[facility] in 2003, with relapse in 2009 treated with BR) now with recently diagnosed grade 3 follicular lymphoma BIBEMS with fever and AMS after being found down by his daughter this AM. Presentation concerning for toxic metabolic encephalopathy in setting of sepsis possible 2/2 pneumonia vs other infectious etiology (GI), being treated w/ abx, course c/b STEPHEN undergoing additional work-up.     Subjective: No fevers overnight. In good spirits today, no new complaints - no SOB on RA.     MEDICATIONS  (STANDING):  acyclovir   Oral Tab/Cap 400 milliGRAM(s) Oral two times a day  allopurinol 50 milliGRAM(s) Oral <User Schedule>  apixaban 2.5 milliGRAM(s) Oral two times a day  atorvastatin 40 milliGRAM(s) Oral at bedtime  chlorhexidine 2% Cloths 1 Application(s) Topical daily  ferrous    sulfate 325 milliGRAM(s) Oral <User Schedule>  lactated ringers. 1000 milliLiter(s) (75 mL/Hr) IV Continuous <Continuous>  metoprolol succinate  milliGRAM(s) Oral daily  piperacillin/tazobactam IVPB.. 3.375 Gram(s) IV Intermittent every 8 hours    MEDICATIONS  (PRN):  senna 2 Tablet(s) Oral at bedtime PRN Constipation      Objective:    Vitals: Vital Signs Last 24 Hrs  T(C): 36.8 (11-02-22 @ 04:21), Max: 37.4 (11-01-22 @ 11:01)  T(F): 98.3 (11-02-22 @ 04:21), Max: 99.3 (11-01-22 @ 11:01)  HR: 88 (11-02-22 @ 04:21) (74 - 88)  BP: 128/60 (11-02-22 @ 04:21) (100/59 - 149/66)  BP(mean): --  RR: 18 (11-02-22 @ 04:21) (18 - 18)  SpO2: 93% (11-02-22 @ 04:21) (92% - 95%)            I&O's Summary    31 Oct 2022 07:01  -  01 Nov 2022 07:00  --------------------------------------------------------  IN: 1260 mL / OUT: 200 mL / NET: 1060 mL    01 Nov 2022 07:01  -  02 Nov 2022 06:57  --------------------------------------------------------  IN: 900 mL / OUT: 200 mL / NET: 700 mL        PHYSICAL EXAM:  GENERAL: NAD  HEAD:  Atraumatic, Normocephalic  EYES: EOMI, conjunctiva and sclera clear  CHEST/LUNG: Clear to auscultation bilaterally; No rales, rhonchi, wheezing, or rubs  HEART: Regular rate and rhythm; No murmurs, rubs, or gallops  ABDOMEN: Soft, Nontender, Nondistended;   SKIN: No rashes or lesions. No pitting edema.   NERVOUS SYSTEM:  Alert & Oriented X3, no focal deficits    LABS:  11-02    136  |  103  |  46<H>  ----------------------------<  115<H>  3.1<L>   |  21<L>  |  2.15<H>  11-01    137  |  102  |  47<H>  ----------------------------<  79  4.2   |  21<L>  |  2.31<H>  10-31    138  |  102  |  43<H>  ----------------------------<  125<H>  3.4<L>   |  23  |  2.33<H>    Ca    8.3<L>      02 Nov 2022 06:16  Ca    8.4      01 Nov 2022 07:36  Ca    8.6      31 Oct 2022 06:18  Phos  2.8     11-02  Mg     1.7     11-02    TPro  5.8<L>  /  Alb  2.2<L>  /  TBili  1.0  /  DBili  x   /  AST  95<H>  /  ALT  70<H>  /  AlkPhos  171<H>  11-02  TPro  6.1  /  Alb  2.5<L>  /  TBili  1.2  /  DBili  x   /  AST  150<H>  /  ALT  97<H>  /  AlkPhos  142<H>  11-01  TPro  6.3  /  Alb  2.8<L>  /  TBili  1.7<H>  /  DBili  x   /  AST  150<H>  /  ALT  97<H>  /  AlkPhos  126<H>  10-31                                              8.5    8.08  )-----------( 131      ( 02 Nov 2022 06:16 )             27.1                         8.4    7.85  )-----------( 125      ( 02 Nov 2022 00:46 )             26.4                         8.5    6.38  )-----------( 120      ( 01 Nov 2022 07:31 )             26.3     CAPILLARY BLOOD GLUCOSE          RADIOLOGY & ADDITIONAL TESTS:    Imaging Personally Reviewed:  [x ] YES  [ ] NO    Consultants involved in case:   Consultant(s) Notes Reviewed:  [ x] YES  [ ] NO:   Care Discussed with Consultants/Other Providers [x ] YES  [ ] NO

## 2022-11-02 NOTE — DIETITIAN INITIAL EVALUATION ADULT - NSICDXPASTMEDICALHX_GEN_ALL_CORE_FT
PAST MEDICAL HISTORY:  Atrial flutter, unspecified type     DM type 2 (diabetes mellitus, type 2) Never on insulin    Essential hypertension     Impaired memory     Moderate mitral regurgitation     Non-Hodgkins Lymphoma In [facility] for > 10 years now with relapse    Pleural effusion     Rhinitis, allergic     Systolic heart failure

## 2022-11-02 NOTE — PROGRESS NOTE ADULT - PROBLEM SELECTOR PLAN 7
- Onc consulted appreciate recommendations - NTD, infectious work-up per primary team  - Renally dosed allopurinol  - Daily TLS labs

## 2022-11-02 NOTE — PROGRESS NOTE ADULT - PROBLEM SELECTOR PLAN 6
- Pt found down prior to admission  - pt w/ hx of PPM, likely suspect that fall was due to AMS  - PPM interrogation w/o events (medtronic percepta quad CRT-P MRI [id number])

## 2022-11-02 NOTE — DIETITIAN INITIAL EVALUATION ADULT - CONTINUE CURRENT NUTRITION CARE PLAN
- Consider formal swallowing evaluation with speech pathologist- Defer diet/fluid consistencies to medical team/SLP recommendations.   - monitor labs and adjust to consistent CHO diet if indicated / needed. liberalized now to promote adequate PO intake/yes

## 2022-11-03 LAB
ALBUMIN SERPL ELPH-MCNC: 2.6 G/DL — LOW (ref 3.3–5)
ALP SERPL-CCNC: 179 U/L — HIGH (ref 40–120)
ALT FLD-CCNC: 55 U/L — HIGH (ref 10–45)
ANION GAP SERPL CALC-SCNC: 11 MMOL/L — SIGNIFICANT CHANGE UP (ref 5–17)
AST SERPL-CCNC: 59 U/L — HIGH (ref 10–40)
BILIRUB SERPL-MCNC: 0.9 MG/DL — SIGNIFICANT CHANGE UP (ref 0.2–1.2)
BUN SERPL-MCNC: 42 MG/DL — HIGH (ref 7–23)
CALCIUM SERPL-MCNC: 8.3 MG/DL — LOW (ref 8.4–10.5)
CHLORIDE SERPL-SCNC: 103 MMOL/L — SIGNIFICANT CHANGE UP (ref 96–108)
CO2 SERPL-SCNC: 23 MMOL/L — SIGNIFICANT CHANGE UP (ref 22–31)
CREAT SERPL-MCNC: 2.02 MG/DL — HIGH (ref 0.5–1.3)
CULTURE RESULTS: SIGNIFICANT CHANGE UP
EGFR: 35 ML/MIN/1.73M2 — LOW
GLUCOSE SERPL-MCNC: 115 MG/DL — HIGH (ref 70–99)
HCT VFR BLD CALC: 27 % — LOW (ref 39–50)
HGB BLD-MCNC: 8.4 G/DL — LOW (ref 13–17)
MAGNESIUM SERPL-MCNC: 1.7 MG/DL — SIGNIFICANT CHANGE UP (ref 1.6–2.6)
MCHC RBC-ENTMCNC: 27.2 PG — SIGNIFICANT CHANGE UP (ref 27–34)
MCHC RBC-ENTMCNC: 31.1 GM/DL — LOW (ref 32–36)
MCV RBC AUTO: 87.4 FL — SIGNIFICANT CHANGE UP (ref 80–100)
NRBC # BLD: 0 /100 WBCS — SIGNIFICANT CHANGE UP (ref 0–0)
PHOSPHATE SERPL-MCNC: 2.5 MG/DL — SIGNIFICANT CHANGE UP (ref 2.5–4.5)
PLATELET # BLD AUTO: 171 K/UL — SIGNIFICANT CHANGE UP (ref 150–400)
POTASSIUM SERPL-MCNC: 3 MMOL/L — LOW (ref 3.5–5.3)
POTASSIUM SERPL-SCNC: 3 MMOL/L — LOW (ref 3.5–5.3)
PROT SERPL-MCNC: 6 G/DL — SIGNIFICANT CHANGE UP (ref 6–8.3)
RBC # BLD: 3.09 M/UL — LOW (ref 4.2–5.8)
RBC # FLD: 19.6 % — HIGH (ref 10.3–14.5)
SODIUM SERPL-SCNC: 137 MMOL/L — SIGNIFICANT CHANGE UP (ref 135–145)
SPECIMEN SOURCE: SIGNIFICANT CHANGE UP
URATE SERPL-MCNC: 6.1 MG/DL — SIGNIFICANT CHANGE UP (ref 3.4–8.8)
WBC # BLD: 8.23 K/UL — SIGNIFICANT CHANGE UP (ref 3.8–10.5)
WBC # FLD AUTO: 8.23 K/UL — SIGNIFICANT CHANGE UP (ref 3.8–10.5)

## 2022-11-03 PROCEDURE — 99232 SBSQ HOSP IP/OBS MODERATE 35: CPT | Mod: GC

## 2022-11-03 PROCEDURE — 71045 X-RAY EXAM CHEST 1 VIEW: CPT | Mod: 26

## 2022-11-03 RX ORDER — POTASSIUM CHLORIDE 20 MEQ
10 PACKET (EA) ORAL
Refills: 0 | Status: COMPLETED | OUTPATIENT
Start: 2022-11-03 | End: 2022-11-03

## 2022-11-03 RX ORDER — POTASSIUM CHLORIDE 20 MEQ
40 PACKET (EA) ORAL ONCE
Refills: 0 | Status: COMPLETED | OUTPATIENT
Start: 2022-11-03 | End: 2022-11-03

## 2022-11-03 RX ADMIN — Medication 40 MILLIEQUIVALENT(S): at 10:27

## 2022-11-03 RX ADMIN — PIPERACILLIN AND TAZOBACTAM 25 GRAM(S): 4; .5 INJECTION, POWDER, LYOPHILIZED, FOR SOLUTION INTRAVENOUS at 21:57

## 2022-11-03 RX ADMIN — Medication 100 MILLIEQUIVALENT(S): at 10:24

## 2022-11-03 RX ADMIN — Medication 400 MILLIGRAM(S): at 18:03

## 2022-11-03 RX ADMIN — Medication 100 MILLIEQUIVALENT(S): at 12:30

## 2022-11-03 RX ADMIN — PIPERACILLIN AND TAZOBACTAM 25 GRAM(S): 4; .5 INJECTION, POWDER, LYOPHILIZED, FOR SOLUTION INTRAVENOUS at 05:10

## 2022-11-03 RX ADMIN — Medication 50 MILLIGRAM(S): at 08:48

## 2022-11-03 RX ADMIN — CHLORHEXIDINE GLUCONATE 1 APPLICATION(S): 213 SOLUTION TOPICAL at 12:33

## 2022-11-03 RX ADMIN — APIXABAN 2.5 MILLIGRAM(S): 2.5 TABLET, FILM COATED ORAL at 18:04

## 2022-11-03 RX ADMIN — Medication 400 MILLIGRAM(S): at 05:10

## 2022-11-03 RX ADMIN — Medication 100 MILLIEQUIVALENT(S): at 16:24

## 2022-11-03 RX ADMIN — ATORVASTATIN CALCIUM 40 MILLIGRAM(S): 80 TABLET, FILM COATED ORAL at 21:56

## 2022-11-03 RX ADMIN — Medication 100 MILLIGRAM(S): at 05:10

## 2022-11-03 RX ADMIN — PIPERACILLIN AND TAZOBACTAM 25 GRAM(S): 4; .5 INJECTION, POWDER, LYOPHILIZED, FOR SOLUTION INTRAVENOUS at 12:31

## 2022-11-03 RX ADMIN — Medication 1 TABLET(S): at 12:32

## 2022-11-03 RX ADMIN — APIXABAN 2.5 MILLIGRAM(S): 2.5 TABLET, FILM COATED ORAL at 05:10

## 2022-11-03 NOTE — PROGRESS NOTE ADULT - PROBLEM SELECTOR PLAN 5
- Current AMS likely in setting of sepsis 2/2 pneumonia  - Per daughter patient waxes and wanes and can be confused at home especially after treatments with oncologist but tends to be better at home vs. when he is admitted. Pt recently admitted in 9/2022 for similar presentation.  - Likely close to baseline   - Sepsis workup as above

## 2022-11-03 NOTE — PROGRESS NOTE ADULT - PROBLEM SELECTOR PLAN 3
- Admitted with sepsis. Pt on chemotherapy for non-Hodgkins Lymphoma making him immunocompromised.  - Sepsis cause unclear, could be PNA, r/o other etiology (GI)   - Pulm consulted, appreciate recommendations               - Defer thoracentesis for now due to similar findings on prior CT, suspect less contribution to fever.                - c/w zosyn for empiric coverage (10/29-)  -c/w home acyclovir ppx  -f/u GI stool culture, Ova and parasites - Admitted with sepsis. Pt on chemotherapy for non-Hodgkins Lymphoma making him immunocompromised.  - Sepsis cause unclear, could be PNA, r/o other etiology (GI)   - Pulm consulted, appreciate recommendations               - Defer thoracentesis for now due to similar findings on prior CT, suspect less contribution to fever.                - c/w zosyn for empiric coverage (10/29-) for 7 days total.   -c/w home acyclovir ppx

## 2022-11-03 NOTE — PROGRESS NOTE ADULT - SUBJECTIVE AND OBJECTIVE BOX
***************************************************************  Jaidenasencio (Ji-Cheng) [employer] PGY1  Internal Medicine   ***************************************************************    NIK GRIMM  72y  MRN: [id number]    Patient is a 72y old  Male who presents with a chief complaint of hypoxia (02 Nov 2022 13:01)      Subjective: no events ON. Denies fever, CP, SOB, abn pain, N/V, dysuria. Tolerating diet.      MEDICATIONS  (STANDING):  acyclovir   Oral Tab/Cap 400 milliGRAM(s) Oral two times a day  allopurinol 50 milliGRAM(s) Oral <User Schedule>  apixaban 2.5 milliGRAM(s) Oral two times a day  atorvastatin 40 milliGRAM(s) Oral at bedtime  chlorhexidine 2% Cloths 1 Application(s) Topical daily  ferrous    sulfate 325 milliGRAM(s) Oral <User Schedule>  lactated ringers. 1000 milliLiter(s) (75 mL/Hr) IV Continuous <Continuous>  metoprolol succinate  milliGRAM(s) Oral daily  multivitamin 1 Tablet(s) Oral daily  piperacillin/tazobactam IVPB.. 3.375 Gram(s) IV Intermittent every 8 hours    MEDICATIONS  (PRN):  senna 2 Tablet(s) Oral at bedtime PRN Constipation      Objective:    Vitals: Vital Signs Last 24 Hrs  T(C): 36.9 (11-03-22 @ 04:49), Max: 36.9 (11-03-22 @ 04:49)  T(F): 98.4 (11-03-22 @ 04:49), Max: 98.4 (11-03-22 @ 04:49)  HR: 76 (11-03-22 @ 04:49) (70 - 79)  BP: 156/70 (11-03-22 @ 04:49) (114/60 - 156/70)  BP(mean): --  RR: 18 (11-03-22 @ 04:49) (18 - 18)  SpO2: 98% (11-03-22 @ 04:49) (96% - 98%)            I&O's Summary    02 Nov 2022 07:01  -  03 Nov 2022 07:00  --------------------------------------------------------  IN: 200 mL / OUT: 600 mL / NET: -400 mL        PHYSICAL EXAM:  GENERAL: NAD  HEAD:  Atraumatic, Normocephalic  EYES: EOMI, conjunctiva and sclera clear  CHEST/LUNG: Clear to auscultation bilaterally; No rales, rhonchi, wheezing, or rubs  HEART: Regular rate and rhythm; No murmurs, rubs, or gallops  ABDOMEN: Soft, Nontender, Nondistended;   SKIN: No rashes or lesions  NERVOUS SYSTEM:  Alert & Oriented X3, no focal deficits    LABS:  11-03    137  |  103  |  42<H>  ----------------------------<  115<H>  3.0<L>   |  23  |  2.02<H>  11-02    136  |  103  |  46<H>  ----------------------------<  115<H>  3.1<L>   |  21<L>  |  2.15<H>  11-01    137  |  102  |  47<H>  ----------------------------<  79  4.2   |  21<L>  |  2.31<H>    Ca    8.3<L>      03 Nov 2022 05:37  Ca    8.3<L>      02 Nov 2022 06:16  Ca    8.4      01 Nov 2022 07:36  Phos  2.5     11-03  Mg     1.7     11-03    TPro  6.0  /  Alb  2.6<L>  /  TBili  0.9  /  DBili  x   /  AST  59<H>  /  ALT  55<H>  /  AlkPhos  179<H>  11-03  TPro  5.8<L>  /  Alb  2.2<L>  /  TBili  1.0  /  DBili  x   /  AST  95<H>  /  ALT  70<H>  /  AlkPhos  171<H>  11-02  TPro  6.1  /  Alb  2.5<L>  /  TBili  1.2  /  DBili  x   /  AST  150<H>  /  ALT  97<H>  /  AlkPhos  142<H>  11-01                                              8.4    8.23  )-----------( 171      ( 03 Nov 2022 05:37 )             27.0                         8.5    8.08  )-----------( 131      ( 02 Nov 2022 06:16 )             27.1                         8.4    7.85  )-----------( 125      ( 02 Nov 2022 00:46 )             26.4     CAPILLARY BLOOD GLUCOSE          RADIOLOGY & ADDITIONAL TESTS:    Imaging Personally Reviewed:  [x ] YES  [ ] NO    Consultants involved in case:   Consultant(s) Notes Reviewed:  [ x] YES  [ ] NO:   Care Discussed with Consultants/Other Providers [x ] YES  [ ] NO         ***************************************************************  Hwang (Ji-Cheng) [facility] PGY1  Internal Medicine   ***************************************************************    NIK GRIMM  72y  MRN: [id number]    72 year old male with afib (on eliquis), HTN, complete heart block s/p PPM, HLD, HFrEF (30% in 09/2022), and DLBCL (tx with R-CHOP in 2003, with relapse in 2009 treated with BR) now with recently diagnosed grade 3 follicular lymphoma BIBEMS with fever and AMS after being found down by his daughter this AM. Presentation concerning for toxic metabolic encephalopathy in setting of sepsis possible 2/2 pneumonia vs other infectious etiology (GI), being treated w/ abx, course c/b STEPHEN, improving.     Subjective: NAEO. No complaints today.     MEDICATIONS  (STANDING):  acyclovir   Oral Tab/Cap 400 milliGRAM(s) Oral two times a day  allopurinol 50 milliGRAM(s) Oral <User Schedule>  apixaban 2.5 milliGRAM(s) Oral two times a day  atorvastatin 40 milliGRAM(s) Oral at bedtime  chlorhexidine 2% Cloths 1 Application(s) Topical daily  ferrous    sulfate 325 milliGRAM(s) Oral <User Schedule>  lactated ringers. 1000 milliLiter(s) (75 mL/Hr) IV Continuous <Continuous>  metoprolol succinate  milliGRAM(s) Oral daily  multivitamin 1 Tablet(s) Oral daily  piperacillin/tazobactam IVPB.. 3.375 Gram(s) IV Intermittent every 8 hours    MEDICATIONS  (PRN):  senna 2 Tablet(s) Oral at bedtime PRN Constipation      Objective:    Vitals: Vital Signs Last 24 Hrs  T(C): 36.9 (11-03-22 @ 04:49), Max: 36.9 (11-03-22 @ 04:49)  T(F): 98.4 (11-03-22 @ 04:49), Max: 98.4 (11-03-22 @ 04:49)  HR: 76 (11-03-22 @ 04:49) (70 - 79)  BP: 156/70 (11-03-22 @ 04:49) (114/60 - 156/70)  BP(mean): --  RR: 18 (11-03-22 @ 04:49) (18 - 18)  SpO2: 98% (11-03-22 @ 04:49) (96% - 98%)            I&O's Summary    02 Nov 2022 07:01  -  03 Nov 2022 07:00  --------------------------------------------------------  IN: 200 mL / OUT: 600 mL / NET: -400 mL        PHYSICAL EXAM:  GENERAL: NAD  HEAD:  Atraumatic, Normocephalic  EYES: EOMI, conjunctiva and sclera clear  CHEST/LUNG: Clear to auscultation bilaterally; No rales, rhonchi, wheezing, or rubs  HEART: Regular rate and rhythm; No murmurs, rubs, or gallops  ABDOMEN: Soft, Nontender, Nondistended;   SKIN: No rashes or lesions  NERVOUS SYSTEM:  Alert & Oriented X3, no focal deficits    LABS:  11-03    137  |  103  |  42<H>  ----------------------------<  115<H>  3.0<L>   |  23  |  2.02<H>  11-02    136  |  103  |  46<H>  ----------------------------<  115<H>  3.1<L>   |  21<L>  |  2.15<H>  11-01    137  |  102  |  47<H>  ----------------------------<  79  4.2   |  21<L>  |  2.31<H>    Ca    8.3<L>      03 Nov 2022 05:37  Ca    8.3<L>      02 Nov 2022 06:16  Ca    8.4      01 Nov 2022 07:36  Phos  2.5     11-03  Mg     1.7     11-03    TPro  6.0  /  Alb  2.6<L>  /  TBili  0.9  /  DBili  x   /  AST  59<H>  /  ALT  55<H>  /  AlkPhos  179<H>  11-03  TPro  5.8<L>  /  Alb  2.2<L>  /  TBili  1.0  /  DBili  x   /  AST  95<H>  /  ALT  70<H>  /  AlkPhos  171<H>  11-02  TPro  6.1  /  Alb  2.5<L>  /  TBili  1.2  /  DBili  x   /  AST  150<H>  /  ALT  97<H>  /  AlkPhos  142<H>  11-01                                              8.4    8.23  )-----------( 171      ( 03 Nov 2022 05:37 )             27.0                         8.5    8.08  )-----------( 131      ( 02 Nov 2022 06:16 )             27.1                         8.4    7.85  )-----------( 125      ( 02 Nov 2022 00:46 )             26.4     CAPILLARY BLOOD GLUCOSE          RADIOLOGY & ADDITIONAL TESTS:    Imaging Personally Reviewed:  [x ] YES  [ ] NO    Consultants involved in case:   Consultant(s) Notes Reviewed:  [ x] YES  [ ] NO:   Care Discussed with Consultants/Other Providers [x ] YES  [ ] NO

## 2022-11-03 NOTE — PROGRESS NOTE ADULT - ATTENDING COMMENTS
72 year old male with afib (on eliquis), HTN,  complete heart block s/p PPM, HLD, HFrEF (30% in 09/2022), and DLBCL (tx with R-CHOP in 2003, with relapse in 2009 treated with BR) now with recently diagnosed grade 3 follicular lymphoma BIBEMS with fever and AMS after being found down by his daughter admitted for acute metabolic metabolic encephalopathy with sepsis 2/2 pneumonia. Pulm consulted for loculated effusion on CT but POCUS showed no tapable pocket. PPM interrogated. TTE- EF 30% with mod pulm htn. Cr improving. Lethargy improved, watching tv with mental status near baseline.     #Acute metabolic encephalopathy  #Sepsis  #PNA  #STEPHEN  #Fe deficiency Anemia  #Fall  #Non Hodgkins Lymphoma    -cw zosyn for 7 day course (last dose 11/4)  -f/u heme/onc in AM for mediport  -PT -CARY    d/w HS3  Updated daughter Vonnie    Lindy Gonzalez MD  Division of Hospital Medicine  Available on Microsoft Teams .

## 2022-11-03 NOTE — PROGRESS NOTE ADULT - PROBLEM SELECTOR PLAN 11
DVT PPx: Eliquis  Diet: Soft and bite sized diet, pending speech and swallow evaluation.   Bowel Regimen:   Code: Full  Dispo: [facility]  Pharmacy:  PCP:   Communication: Spoke with daughter 10/29: 6:30PM

## 2022-11-03 NOTE — PROGRESS NOTE ADULT - PROBLEM SELECTOR PROBLEM 2
In received [facility] paperwork for the patient and forwarded them to Ela.   Severe mitral regurgitation

## 2022-11-03 NOTE — PROGRESS NOTE ADULT - PROBLEM SELECTOR PLAN 1
- Possibly prerenal STEPHEN i/s/o sepsis and diarrhea vs ATN vs contrast nephropathy. Baseline SCr 1.5. FeNa suggesting pre-renal but unchanged Cr despite maint fluids. Bladder scans <400.   - Likely in setting of poor PO.  - Cr improving s/p fluids, encouraged PO

## 2022-11-03 NOTE — PROGRESS NOTE ADULT - PROBLEM SELECTOR PLAN 4
- Unclear as to cause, could be in setting of sepsis, if stable can undergo outpatient work-up.   - CTM and treat sepsis as above - Unclear as to cause, could be in setting of sepsis, if stable can undergo outpatient work-up.   - Stable.

## 2022-11-04 LAB
ALBUMIN SERPL ELPH-MCNC: 2.8 G/DL — LOW (ref 3.3–5)
ALP SERPL-CCNC: 174 U/L — HIGH (ref 40–120)
ALT FLD-CCNC: 43 U/L — SIGNIFICANT CHANGE UP (ref 10–45)
ANION GAP SERPL CALC-SCNC: 14 MMOL/L — SIGNIFICANT CHANGE UP (ref 5–17)
AST SERPL-CCNC: 43 U/L — HIGH (ref 10–40)
BILIRUB SERPL-MCNC: 0.7 MG/DL — SIGNIFICANT CHANGE UP (ref 0.2–1.2)
BUN SERPL-MCNC: 38 MG/DL — HIGH (ref 7–23)
CALCIUM SERPL-MCNC: 8.4 MG/DL — SIGNIFICANT CHANGE UP (ref 8.4–10.5)
CHLORIDE SERPL-SCNC: 105 MMOL/L — SIGNIFICANT CHANGE UP (ref 96–108)
CO2 SERPL-SCNC: 21 MMOL/L — LOW (ref 22–31)
CREAT SERPL-MCNC: 1.73 MG/DL — HIGH (ref 0.5–1.3)
EGFR: 42 ML/MIN/1.73M2 — LOW
GLUCOSE SERPL-MCNC: 94 MG/DL — SIGNIFICANT CHANGE UP (ref 70–99)
HCT VFR BLD CALC: 25.2 % — LOW (ref 39–50)
HGB BLD-MCNC: 7.9 G/DL — LOW (ref 13–17)
MAGNESIUM SERPL-MCNC: 1.8 MG/DL — SIGNIFICANT CHANGE UP (ref 1.6–2.6)
MCHC RBC-ENTMCNC: 27.5 PG — SIGNIFICANT CHANGE UP (ref 27–34)
MCHC RBC-ENTMCNC: 31.3 GM/DL — LOW (ref 32–36)
MCV RBC AUTO: 87.8 FL — SIGNIFICANT CHANGE UP (ref 80–100)
NRBC # BLD: 0 /100 WBCS — SIGNIFICANT CHANGE UP (ref 0–0)
PHOSPHATE SERPL-MCNC: 2.8 MG/DL — SIGNIFICANT CHANGE UP (ref 2.5–4.5)
PLATELET # BLD AUTO: 216 K/UL — SIGNIFICANT CHANGE UP (ref 150–400)
POTASSIUM SERPL-MCNC: 3.4 MMOL/L — LOW (ref 3.5–5.3)
POTASSIUM SERPL-SCNC: 3.4 MMOL/L — LOW (ref 3.5–5.3)
PROT SERPL-MCNC: 5.7 G/DL — LOW (ref 6–8.3)
RBC # BLD: 2.87 M/UL — LOW (ref 4.2–5.8)
RBC # FLD: 19.1 % — HIGH (ref 10.3–14.5)
SARS-COV-2 RNA SPEC QL NAA+PROBE: SIGNIFICANT CHANGE UP
SODIUM SERPL-SCNC: 140 MMOL/L — SIGNIFICANT CHANGE UP (ref 135–145)
URATE SERPL-MCNC: 5.5 MG/DL — SIGNIFICANT CHANGE UP (ref 3.4–8.8)
WBC # BLD: 7.16 K/UL — SIGNIFICANT CHANGE UP (ref 3.8–10.5)
WBC # FLD AUTO: 7.16 K/UL — SIGNIFICANT CHANGE UP (ref 3.8–10.5)

## 2022-11-04 PROCEDURE — 99232 SBSQ HOSP IP/OBS MODERATE 35: CPT | Mod: GC

## 2022-11-04 RX ORDER — POTASSIUM CHLORIDE 20 MEQ
40 PACKET (EA) ORAL ONCE
Refills: 0 | Status: COMPLETED | OUTPATIENT
Start: 2022-11-04 | End: 2022-11-04

## 2022-11-04 RX ADMIN — CHLORHEXIDINE GLUCONATE 1 APPLICATION(S): 213 SOLUTION TOPICAL at 12:46

## 2022-11-04 RX ADMIN — Medication 400 MILLIGRAM(S): at 17:23

## 2022-11-04 RX ADMIN — Medication 325 MILLIGRAM(S): at 08:59

## 2022-11-04 RX ADMIN — PIPERACILLIN AND TAZOBACTAM 25 GRAM(S): 4; .5 INJECTION, POWDER, LYOPHILIZED, FOR SOLUTION INTRAVENOUS at 05:50

## 2022-11-04 RX ADMIN — Medication 1 TABLET(S): at 12:46

## 2022-11-04 RX ADMIN — PIPERACILLIN AND TAZOBACTAM 25 GRAM(S): 4; .5 INJECTION, POWDER, LYOPHILIZED, FOR SOLUTION INTRAVENOUS at 13:43

## 2022-11-04 RX ADMIN — APIXABAN 2.5 MILLIGRAM(S): 2.5 TABLET, FILM COATED ORAL at 05:50

## 2022-11-04 RX ADMIN — Medication 100 MILLIGRAM(S): at 05:50

## 2022-11-04 RX ADMIN — Medication 40 MILLIEQUIVALENT(S): at 08:54

## 2022-11-04 RX ADMIN — APIXABAN 2.5 MILLIGRAM(S): 2.5 TABLET, FILM COATED ORAL at 17:23

## 2022-11-04 RX ADMIN — PIPERACILLIN AND TAZOBACTAM 25 GRAM(S): 4; .5 INJECTION, POWDER, LYOPHILIZED, FOR SOLUTION INTRAVENOUS at 21:41

## 2022-11-04 RX ADMIN — Medication 400 MILLIGRAM(S): at 05:50

## 2022-11-04 RX ADMIN — ATORVASTATIN CALCIUM 40 MILLIGRAM(S): 80 TABLET, FILM COATED ORAL at 21:16

## 2022-11-04 NOTE — SWALLOW BEDSIDE ASSESSMENT ADULT - SWALLOW EVAL: PATIENT/FAMILY GOALS STATEMENT
Pt unreliable historian however denied history of dysphagia and has not been previously evaluated by an SLP for swallowing.

## 2022-11-04 NOTE — PROGRESS NOTE ADULT - PROBLEM SELECTOR PLAN 2
- Possibly myelosuppression 2/2 sepsis in setting of recent chemotherapy and cancer.  - Stable. Continue Iron every other day.

## 2022-11-04 NOTE — SWALLOW BEDSIDE ASSESSMENT ADULT - SLP PERTINENT HISTORY OF CURRENT PROBLEM
On admit: A&Ox2, knows name and location but not date/year, normal affect. Pt admitted for toxic metabolic encephalopathy in setting of sepsis possible 2/2 pneumonia. Heme onc following for DLBCL. Pulm following for Pleural effusion->Defer thoracentesis for now due to similar findings on prior CT, suspect less contribution to fever.

## 2022-11-04 NOTE — SWALLOW BEDSIDE ASSESSMENT ADULT - H & P REVIEW
72 year old male with afib/aflutter (on eliquis), HTN,  complete heart block s/p PPM, HLD, HFrEF (30% in 09/2022), and DLBCL (tx with R-CHOP in 2003, with relapse in 2009 treated with BR) now with recently diagnosed grade 3 follicular lymphoma BIBEMS with fever and AMS after being found down by his daughter this AM. Pt is acutely altered and unable to reach daughter, but per ED note, patient was found by daughter on the floor after she had to break in when he was not answering the door. Daughter noted that pt was at his baseline up until 12 PM, later went to see him at around 6:30 PM which is when she found him on the dining room floor. Pt had episode of urinary incontinence, which is not normal. At bedside, pt is AAOx2, remembers falling but does not remember the year. Reports no complaints, denies fever, chills, CP, SOB, nausea, vomiting, diarrhea, constipation, dysuria, hematuria, hematochezia, melena, numbness, tingling. Pt reports he lives alone, ambulates without assistance at baseline. Recently admitted on 9/16/22 for AMS, was found wandering in parking lot confused after treatment at [facility] [place], AAOx2 at the time, RVP + rhino/enterovirus. Evaluated by neuro, with EEG, MRI, and LP negative. ED Course: Vitals: T 102.6 F, HR 77, /75, RR 18, SpO2 97% WBC 14, Hgb 9.5, INR 2.11, SCr 1.49, trop 82, VBG 7.48/39/38/29 received ofirmev 1g, zosyn x 2, vanc 1g x 1,  cc bolus. CTA Chest: no PE, interval consolidative/GGO in RLL, continued moderate loculated effusion along minor fissure. CT A/P: cholelithiasis, hepatosplenomegaly. CT Cervical spine: no acute disease. CTH: no acute disease/yes

## 2022-11-04 NOTE — PROGRESS NOTE ADULT - PROBLEM SELECTOR PLAN 4
- Unclear as to cause, could be in setting of sepsis, if stable can undergo outpatient work-up.   - Stable.

## 2022-11-04 NOTE — SWALLOW BEDSIDE ASSESSMENT ADULT - COMMENTS
CXR on 11/3: Decreasing loculated effusion in the minor fissure. Minimal right basilar patchy atelectasis.    Pt unknown to this service

## 2022-11-04 NOTE — PROGRESS NOTE ADULT - ATTENDING COMMENTS
72 year old male with afib (eliquis), HTN,  complete heart block s/p PPM, HLD, HFrEF (30% in 09/2022), and DLBCL (tx with R-CHOP in 2003, with relapse in 2009 treated with BR) now with recently diagnosed grade 3 follicular lymphoma BIBEMS with fever and AMS after being found down by his daughter admitted for acute metabolic encephalopathy with sepsis 2/2 pneumonia s/p 7 days zosyn. Pulm consulted for loculated effusion on CT but POCUS showed no tapable pocket. PPM interrogated. TTE- EF 30% with mod pulm htn. Cr improving. Lethargy improved, watching tv with mental status near baseline.     #Acute metabolic encephalopathy  #PNA  #STEPHEN  #Fe deficiency Anemia  #Fall  #Non Hodgkins Lymphoma    -s/p 7 days of zosyn  -per heme onc, patient can continue with next dose  of chemo from [facility] through peripheral IV. Mediport can be rescheduled  -PT -[facility]    d/w HS3  Dispo planning to subacute rehab. STEPHEN improving and completed 7days of abx for pna.     Lindy Gonzalez MD  Division of Hospital Medicine  Available on Microsoft Teams .

## 2022-11-04 NOTE — SWALLOW BEDSIDE ASSESSMENT ADULT - ADDITIONAL RECOMMENDATIONS
Maintain good oral hygiene  Service will continue to follow and monitor tolerance of recommended diet

## 2022-11-04 NOTE — SWALLOW BEDSIDE ASSESSMENT ADULT - ASR SWALLOW ASPIRATION MONITOR
Monitor for s/s aspiration/laryngeal penetration. If noted:  D/C p.o. intake, provide non-oral nutrition/hydration/meds, and contact this service @ [phone]/change of breathing pattern/cough/gurgly voice/fever/pneumonia/throat clearing/upper respiratory infection

## 2022-11-04 NOTE — SWALLOW BEDSIDE ASSESSMENT ADULT - SLP GENERAL OBSERVATIONS
Pt awake in bed, on room air, flat affect. Pleasantly confused. Oriented x2 (name, and hospital when given choice of two). Able to communicate simple needs and follow simple directions for exam.

## 2022-11-04 NOTE — SWALLOW BEDSIDE ASSESSMENT ADULT - SWALLOW EVAL: DIAGNOSIS
Pt is 73 y/o M admitted for toxic metabolic encephalopathy in setting of sepsis possible 2/2 pneumonia. Now presenting with 1. an oral and suspected pharyngeal dysphagia marked by prolonged but functional mastication, suspected uncontrolled loss, and delayed pharyngeal swallows. No overt signs of laryngeal penetration/aspiration observed on exam. 2. ? Cognitive linguistic impairments most likely related to toxic metabolic encephalopathy.

## 2022-11-04 NOTE — PROGRESS NOTE ADULT - SUBJECTIVE AND OBJECTIVE BOX
***************************************************************  Jaidenasencio (Ji-Cheng) [employer] PGY1  Internal Medicine   ***************************************************************    NIK GRIMM  72y  MRN: [id number]    Patient is a 72y old  Male who presents with a chief complaint of hypoxia (03 Nov 2022 07:21)      Subjective: no events ON. Denies fever, CP, SOB, abn pain, N/V, dysuria. Tolerating diet.      MEDICATIONS  (STANDING):  acyclovir   Oral Tab/Cap 400 milliGRAM(s) Oral two times a day  allopurinol 50 milliGRAM(s) Oral <User Schedule>  apixaban 2.5 milliGRAM(s) Oral two times a day  atorvastatin 40 milliGRAM(s) Oral at bedtime  chlorhexidine 2% Cloths 1 Application(s) Topical daily  ferrous    sulfate 325 milliGRAM(s) Oral <User Schedule>  lactated ringers. 1000 milliLiter(s) (75 mL/Hr) IV Continuous <Continuous>  metoprolol succinate  milliGRAM(s) Oral daily  multivitamin 1 Tablet(s) Oral daily  piperacillin/tazobactam IVPB.. 3.375 Gram(s) IV Intermittent every 8 hours    MEDICATIONS  (PRN):  senna 2 Tablet(s) Oral at bedtime PRN Constipation      Objective:    Vitals: Vital Signs Last 24 Hrs  T(C): 36.7 (11-04-22 @ 04:28), Max: 36.7 (11-04-22 @ 04:28)  T(F): 98 (11-04-22 @ 04:28), Max: 98 (11-04-22 @ 04:28)  HR: 67 (11-04-22 @ 05:48) (67 - 89)  BP: 138/68 (11-04-22 @ 05:48) (132/68 - 165/70)  BP(mean): --  RR: 18 (11-04-22 @ 04:28) (18 - 18)  SpO2: 99% (11-04-22 @ 04:28) (97% - 100%)            I&O's Summary    03 Nov 2022 07:01  -  04 Nov 2022 07:00  --------------------------------------------------------  IN: 1125 mL / OUT: 750 mL / NET: 375 mL        PHYSICAL EXAM:  GENERAL: NAD  HEAD:  Atraumatic, Normocephalic  EYES: EOMI, conjunctiva and sclera clear  CHEST/LUNG: Clear to auscultation bilaterally; No rales, rhonchi, wheezing, or rubs  HEART: Regular rate and rhythm; No murmurs, rubs, or gallops  ABDOMEN: Soft, Nontender, Nondistended;   SKIN: No rashes or lesions  NERVOUS SYSTEM:  Alert & Oriented X3, no focal deficits    LABS:  11-03    137  |  103  |  42<H>  ----------------------------<  115<H>  3.0<L>   |  23  |  2.02<H>  11-02    136  |  103  |  46<H>  ----------------------------<  115<H>  3.1<L>   |  21<L>  |  2.15<H>  11-01    137  |  102  |  47<H>  ----------------------------<  79  4.2   |  21<L>  |  2.31<H>    Ca    8.3<L>      03 Nov 2022 05:37  Ca    8.3<L>      02 Nov 2022 06:16  Ca    8.4      01 Nov 2022 07:36  Phos  2.5     11-03  Mg     1.7     11-03    TPro  6.0  /  Alb  2.6<L>  /  TBili  0.9  /  DBili  x   /  AST  59<H>  /  ALT  55<H>  /  AlkPhos  179<H>  11-03  TPro  5.8<L>  /  Alb  2.2<L>  /  TBili  1.0  /  DBili  x   /  AST  95<H>  /  ALT  70<H>  /  AlkPhos  171<H>  11-02  TPro  6.1  /  Alb  2.5<L>  /  TBili  1.2  /  DBili  x   /  AST  150<H>  /  ALT  97<H>  /  AlkPhos  142<H>  11-01                                              8.4    8.23  )-----------( 171      ( 03 Nov 2022 05:37 )             27.0                         8.5    8.08  )-----------( 131      ( 02 Nov 2022 06:16 )             27.1                         8.4    7.85  )-----------( 125      ( 02 Nov 2022 00:46 )             26.4     CAPILLARY BLOOD GLUCOSE          RADIOLOGY & ADDITIONAL TESTS:    Imaging Personally Reviewed:  [x ] YES  [ ] NO    Consultants involved in case:   Consultant(s) Notes Reviewed:  [ x] YES  [ ] NO:   Care Discussed with Consultants/Other Providers [x ] YES  [ ] NO         ***************************************************************  Hwang (Ji-Cheng) [facility] PGY1  Internal Medicine   ***************************************************************    NIK GRIMM  72y  MRN: [id number]    72 year old male with afib (on eliquis), HTN, complete heart block s/p PPM, HLD, HFrEF (30% in 09/2022), and DLBCL (tx with R-CHOP in 2003, with relapse in 2009 treated with BR) now with recently diagnosed grade 3 follicular lymphoma BIBEMS with fever and AMS after being found down by his daughter this AM. Presentation concerning for toxic metabolic encephalopathy in setting of sepsis possible 2/2 pneumonia vs other infectious etiology (GI), being treated w/ abx, course c/b STEPHEN undergoing additional work-up.     Subjective: NAEO. Comfortable today with no new complaints.     MEDICATIONS  (STANDING):  acyclovir   Oral Tab/Cap 400 milliGRAM(s) Oral two times a day  allopurinol 50 milliGRAM(s) Oral <User Schedule>  apixaban 2.5 milliGRAM(s) Oral two times a day  atorvastatin 40 milliGRAM(s) Oral at bedtime  chlorhexidine 2% Cloths 1 Application(s) Topical daily  ferrous    sulfate 325 milliGRAM(s) Oral <User Schedule>  lactated ringers. 1000 milliLiter(s) (75 mL/Hr) IV Continuous <Continuous>  metoprolol succinate  milliGRAM(s) Oral daily  multivitamin 1 Tablet(s) Oral daily  piperacillin/tazobactam IVPB.. 3.375 Gram(s) IV Intermittent every 8 hours    MEDICATIONS  (PRN):  senna 2 Tablet(s) Oral at bedtime PRN Constipation      Objective:    Vitals: Vital Signs Last 24 Hrs  T(C): 36.7 (11-04-22 @ 04:28), Max: 36.7 (11-04-22 @ 04:28)  T(F): 98 (11-04-22 @ 04:28), Max: 98 (11-04-22 @ 04:28)  HR: 67 (11-04-22 @ 05:48) (67 - 89)  BP: 138/68 (11-04-22 @ 05:48) (132/68 - 165/70)  BP(mean): --  RR: 18 (11-04-22 @ 04:28) (18 - 18)  SpO2: 99% (11-04-22 @ 04:28) (97% - 100%)            I&O's Summary    03 Nov 2022 07:01  -  04 Nov 2022 07:00  --------------------------------------------------------  IN: 1125 mL / OUT: 750 mL / NET: 375 mL        PHYSICAL EXAM:  GENERAL: NAD  HEAD:  Atraumatic, Normocephalic  EYES: EOMI, conjunctiva and sclera clear  CHEST/LUNG: Clear to auscultation bilaterally; No rales, rhonchi, wheezing, or rubs  HEART: Regular rate and rhythm; No murmurs, rubs, or gallops  ABDOMEN: Soft, Nontender, Nondistended;   SKIN: No rashes or lesions  NERVOUS SYSTEM:  Alert & Oriented X3, no focal deficits    LABS:  11-03    137  |  103  |  42<H>  ----------------------------<  115<H>  3.0<L>   |  23  |  2.02<H>  11-02    136  |  103  |  46<H>  ----------------------------<  115<H>  3.1<L>   |  21<L>  |  2.15<H>  11-01    137  |  102  |  47<H>  ----------------------------<  79  4.2   |  21<L>  |  2.31<H>    Ca    8.3<L>      03 Nov 2022 05:37  Ca    8.3<L>      02 Nov 2022 06:16  Ca    8.4      01 Nov 2022 07:36  Phos  2.5     11-03  Mg     1.7     11-03    TPro  6.0  /  Alb  2.6<L>  /  TBili  0.9  /  DBili  x   /  AST  59<H>  /  ALT  55<H>  /  AlkPhos  179<H>  11-03  TPro  5.8<L>  /  Alb  2.2<L>  /  TBili  1.0  /  DBili  x   /  AST  95<H>  /  ALT  70<H>  /  AlkPhos  171<H>  11-02  TPro  6.1  /  Alb  2.5<L>  /  TBili  1.2  /  DBili  x   /  AST  150<H>  /  ALT  97<H>  /  AlkPhos  142<H>  11-01                                              8.4    8.23  )-----------( 171      ( 03 Nov 2022 05:37 )             27.0                         8.5    8.08  )-----------( 131      ( 02 Nov 2022 06:16 )             27.1                         8.4    7.85  )-----------( 125      ( 02 Nov 2022 00:46 )             26.4     CAPILLARY BLOOD GLUCOSE          RADIOLOGY & ADDITIONAL TESTS:    Imaging Personally Reviewed:  [x ] YES  [ ] NO    Consultants involved in case:   Consultant(s) Notes Reviewed:  [ x] YES  [ ] NO:   Care Discussed with Consultants/Other Providers [x ] YES  [ ] NO

## 2022-11-05 LAB
ALBUMIN SERPL ELPH-MCNC: 2.9 G/DL — LOW (ref 3.3–5)
ALP SERPL-CCNC: 206 U/L — HIGH (ref 40–120)
ALT FLD-CCNC: 43 U/L — SIGNIFICANT CHANGE UP (ref 10–45)
ANION GAP SERPL CALC-SCNC: 14 MMOL/L — SIGNIFICANT CHANGE UP (ref 5–17)
AST SERPL-CCNC: 45 U/L — HIGH (ref 10–40)
BILIRUB SERPL-MCNC: 0.7 MG/DL — SIGNIFICANT CHANGE UP (ref 0.2–1.2)
BUN SERPL-MCNC: 34 MG/DL — HIGH (ref 7–23)
CALCIUM SERPL-MCNC: 8.6 MG/DL — SIGNIFICANT CHANGE UP (ref 8.4–10.5)
CHLORIDE SERPL-SCNC: 106 MMOL/L — SIGNIFICANT CHANGE UP (ref 96–108)
CO2 SERPL-SCNC: 21 MMOL/L — LOW (ref 22–31)
CREAT SERPL-MCNC: 1.64 MG/DL — HIGH (ref 0.5–1.3)
CULTURE RESULTS: SIGNIFICANT CHANGE UP
CULTURE RESULTS: SIGNIFICANT CHANGE UP
EGFR: 44 ML/MIN/1.73M2 — LOW
GLUCOSE SERPL-MCNC: 113 MG/DL — HIGH (ref 70–99)
HCT VFR BLD CALC: 25.6 % — LOW (ref 39–50)
HGB BLD-MCNC: 8.3 G/DL — LOW (ref 13–17)
MAGNESIUM SERPL-MCNC: 1.5 MG/DL — LOW (ref 1.6–2.6)
MCHC RBC-ENTMCNC: 27.8 PG — SIGNIFICANT CHANGE UP (ref 27–34)
MCHC RBC-ENTMCNC: 32.4 GM/DL — SIGNIFICANT CHANGE UP (ref 32–36)
MCV RBC AUTO: 85.6 FL — SIGNIFICANT CHANGE UP (ref 80–100)
NRBC # BLD: 0 /100 WBCS — SIGNIFICANT CHANGE UP (ref 0–0)
PHOSPHATE SERPL-MCNC: 2.2 MG/DL — LOW (ref 2.5–4.5)
PLATELET # BLD AUTO: 279 K/UL — SIGNIFICANT CHANGE UP (ref 150–400)
POTASSIUM SERPL-MCNC: 3.4 MMOL/L — LOW (ref 3.5–5.3)
POTASSIUM SERPL-SCNC: 3.4 MMOL/L — LOW (ref 3.5–5.3)
PROT SERPL-MCNC: 6.1 G/DL — SIGNIFICANT CHANGE UP (ref 6–8.3)
RBC # BLD: 2.99 M/UL — LOW (ref 4.2–5.8)
RBC # FLD: 19.1 % — HIGH (ref 10.3–14.5)
SODIUM SERPL-SCNC: 141 MMOL/L — SIGNIFICANT CHANGE UP (ref 135–145)
SPECIMEN SOURCE: SIGNIFICANT CHANGE UP
SPECIMEN SOURCE: SIGNIFICANT CHANGE UP
URATE SERPL-MCNC: 4.9 MG/DL — SIGNIFICANT CHANGE UP (ref 3.4–8.8)
WBC # BLD: 8.58 K/UL — SIGNIFICANT CHANGE UP (ref 3.8–10.5)
WBC # FLD AUTO: 8.58 K/UL — SIGNIFICANT CHANGE UP (ref 3.8–10.5)

## 2022-11-05 PROCEDURE — 99232 SBSQ HOSP IP/OBS MODERATE 35: CPT | Mod: GC

## 2022-11-05 RX ORDER — LANOLIN ALCOHOL/MO/W.PET/CERES
5 CREAM (GRAM) TOPICAL ONCE
Refills: 0 | Status: COMPLETED | OUTPATIENT
Start: 2022-11-05 | End: 2022-11-05

## 2022-11-05 RX ORDER — MAGNESIUM SULFATE 500 MG/ML
2 VIAL (ML) INJECTION ONCE
Refills: 0 | Status: COMPLETED | OUTPATIENT
Start: 2022-11-05 | End: 2022-11-05

## 2022-11-05 RX ORDER — POTASSIUM PHOSPHATE, MONOBASIC POTASSIUM PHOSPHATE, DIBASIC 236; 224 MG/ML; MG/ML
15 INJECTION, SOLUTION INTRAVENOUS ONCE
Refills: 0 | Status: COMPLETED | OUTPATIENT
Start: 2022-11-05 | End: 2022-11-05

## 2022-11-05 RX ADMIN — Medication 50 MILLIGRAM(S): at 13:11

## 2022-11-05 RX ADMIN — Medication 400 MILLIGRAM(S): at 19:45

## 2022-11-05 RX ADMIN — ATORVASTATIN CALCIUM 40 MILLIGRAM(S): 80 TABLET, FILM COATED ORAL at 21:13

## 2022-11-05 RX ADMIN — APIXABAN 2.5 MILLIGRAM(S): 2.5 TABLET, FILM COATED ORAL at 19:45

## 2022-11-05 RX ADMIN — POTASSIUM PHOSPHATE, MONOBASIC POTASSIUM PHOSPHATE, DIBASIC 62.5 MILLIMOLE(S): 236; 224 INJECTION, SOLUTION INTRAVENOUS at 13:12

## 2022-11-05 RX ADMIN — Medication 400 MILLIGRAM(S): at 05:22

## 2022-11-05 RX ADMIN — APIXABAN 2.5 MILLIGRAM(S): 2.5 TABLET, FILM COATED ORAL at 05:21

## 2022-11-05 RX ADMIN — Medication 1 TABLET(S): at 13:12

## 2022-11-05 RX ADMIN — Medication 5 MILLIGRAM(S): at 23:00

## 2022-11-05 RX ADMIN — Medication 25 GRAM(S): at 13:12

## 2022-11-05 RX ADMIN — CHLORHEXIDINE GLUCONATE 1 APPLICATION(S): 213 SOLUTION TOPICAL at 13:12

## 2022-11-05 RX ADMIN — Medication 100 MILLIGRAM(S): at 05:21

## 2022-11-05 NOTE — PROGRESS NOTE ADULT - SUBJECTIVE AND OBJECTIVE BOX
PROGRESS NOTE:     Patient is a 72y old  Male who presents with a chief complaint of hypoxia (04 Nov 2022 07:11)      SUBJECTIVE / OVERNIGHT EVENTS:  No acute events overnight. Seen and examined at bedside. Improving STEPHEN.    ADDITIONAL REVIEW OF SYSTEMS:    MEDICATIONS  (STANDING):  acyclovir   Oral Tab/Cap 400 milliGRAM(s) Oral two times a day  allopurinol 50 milliGRAM(s) Oral <User Schedule>  apixaban 2.5 milliGRAM(s) Oral two times a day  atorvastatin 40 milliGRAM(s) Oral at bedtime  chlorhexidine 2% Cloths 1 Application(s) Topical daily  ferrous    sulfate 325 milliGRAM(s) Oral <User Schedule>  lactated ringers. 1000 milliLiter(s) (75 mL/Hr) IV Continuous <Continuous>  magnesium sulfate  IVPB 2 Gram(s) IV Intermittent once  metoprolol succinate  milliGRAM(s) Oral daily  multivitamin 1 Tablet(s) Oral daily  potassium phosphate IVPB 15 milliMole(s) IV Intermittent once    MEDICATIONS  (PRN):  senna 2 Tablet(s) Oral at bedtime PRN Constipation      CAPILLARY BLOOD GLUCOSE        I&O's Summary    04 Nov 2022 07:01  -  05 Nov 2022 07:00  --------------------------------------------------------  IN: 120 mL / OUT: 250 mL / NET: -130 mL        PHYSICAL EXAM:  Vital Signs Last 24 Hrs  T(C): 36.6 (05 Nov 2022 05:09), Max: 36.7 (04 Nov 2022 21:41)  T(F): 97.9 (05 Nov 2022 05:09), Max: 98.1 (04 Nov 2022 21:41)  HR: 88 (05 Nov 2022 05:09) (66 - 90)  BP: 161/70 (05 Nov 2022 05:09) (153/73 - 163/74)  BP(mean): --  RR: 18 (05 Nov 2022 05:09) (18 - 18)  SpO2: 95% (05 Nov 2022 05:09) (95% - 98%)    Parameters below as of 05 Nov 2022 05:09  Patient On (Oxygen Delivery Method): room air        GENERAL: NAD, lying in bed comfortably  HEAD:  Atraumatic, Normocephalic  EYES: EOMI, PERRLA, conjunctiva and sclera clear  ENT: Moist mucous membranes  NECK: Supple, No JVD  CHEST/LUNG: Clear to auscultation bilaterally; No rales, rhonchi, wheezing, or rubs. Unlabored respirations  HEART: Regular rate and rhythm; No murmurs, rubs, or gallops  ABDOMEN: BSx4; Soft, nontender, nondistended  EXTREMITIES:  2+ Peripheral Pulses, brisk capillary refill. No clubbing, cyanosis, or edema  NERVOUS SYSTEM:  A&Ox2, no focal deficits   SKIN: No rashes or lesions    LABS:                        8.3    8.58  )-----------( 279      ( 05 Nov 2022 05:59 )             25.6     11-05    141  |  106  |  34<H>  ----------------------------<  113<H>  3.4<L>   |  21<L>  |  1.64<H>    Ca    8.6      05 Nov 2022 05:58  Phos  2.2     11-05  Mg     1.5     11-05    TPro  6.1  /  Alb  2.9<L>  /  TBili  0.7  /  DBili  x   /  AST  45<H>  /  ALT  43  /  AlkPhos  206<H>  11-05                RADIOLOGY & ADDITIONAL TESTS:  Results Reviewed:   Imaging Personally Reviewed:  Electrocardiogram Personally Reviewed:    COORDINATION OF CARE:  Care Discussed with Consultants/Other Providers [Y/N]:  Prior or Outpatient Records Reviewed [Y/N]:

## 2022-11-05 NOTE — PROGRESS NOTE ADULT - PROBLEM SELECTOR PLAN 3
- Admitted with sepsis. Pt on chemotherapy for non-Hodgkins Lymphoma making him immunocompromised.  - Sepsis cause unclear, could be PNA, r/o other etiology (GI)   - Pulm consulted, appreciate recommendations               - Defer thoracentesis for now due to similar findings on prior CT, suspect less contribution to fever.                - c/w zosyn for empiric coverage (10/29-10/4) for 7 days total.   -c/w home acyclovir ppx

## 2022-11-05 NOTE — PROGRESS NOTE ADULT - ATTENDING COMMENTS
72M with afib (eliquis), HTN,  complete heart block s/p PPM, HLD, HFrEF (30% in 09/2022), and DLBCL (tx with R-CHOP in 2003, with relapse in 2009 treated with BR) now with recently diagnosed grade 3 follicular lymphoma BIBEMS with fever and AMS after being found down by his daughter admitted for acute metabolic encephalopathy with sepsis 2/2 pneumonia now s/p 7 days zosyn with improvement  Now awaiting [facility]  Rest per resident documentation above    [facility] Division of Hospital Medicine  Vaishali Lemus MD  Pager (M-F, 8A-5P): [phone]  Other Times:  [phone] 72M with afib (eliquis), HTN,  complete heart block s/p PPM, HLD, HFrEF (30% in 09/2022), and DLBCL (tx with R-CHOP in 2003, with relapse in 2009 treated with BR) now with recently diagnosed grade 3 follicular lymphoma BIBEMS with fever and AMS after being found down by his daughter admitted for acute metabolic encephalopathy with sepsis 2/2 pneumonia now s/p 7 days zosyn with improvement  Now awaiting [facility]  STEPHEN improving daily  Replete electrolytes  Rest per resident documentation above    [facility] Division of Hospital Medicine  Vaishali Lemus MD  Pager (M-F, 8A-5P): [phone]  Other Times:  [phone]

## 2022-11-05 NOTE — PROGRESS NOTE ADULT - PROBLEM SELECTOR PLAN 11
DVT PPx: Eliquis  Diet: Soft and bite sized diet, pending speech and swallow evaluation.   Bowel Regimen:   Code: Full  Dispo: [facility]  Pharmacy:  PCP:   Communication: Spoke with daughter 11/4

## 2022-11-06 LAB
ALBUMIN SERPL ELPH-MCNC: 2.8 G/DL — LOW (ref 3.3–5)
ALP SERPL-CCNC: 163 U/L — HIGH (ref 40–120)
ALT FLD-CCNC: 33 U/L — SIGNIFICANT CHANGE UP (ref 10–45)
ANION GAP SERPL CALC-SCNC: 12 MMOL/L — SIGNIFICANT CHANGE UP (ref 5–17)
AST SERPL-CCNC: 34 U/L — SIGNIFICANT CHANGE UP (ref 10–40)
BILIRUB SERPL-MCNC: 0.6 MG/DL — SIGNIFICANT CHANGE UP (ref 0.2–1.2)
BUN SERPL-MCNC: 29 MG/DL — HIGH (ref 7–23)
CALCIUM SERPL-MCNC: 8.4 MG/DL — SIGNIFICANT CHANGE UP (ref 8.4–10.5)
CHLORIDE SERPL-SCNC: 107 MMOL/L — SIGNIFICANT CHANGE UP (ref 96–108)
CO2 SERPL-SCNC: 23 MMOL/L — SIGNIFICANT CHANGE UP (ref 22–31)
CREAT SERPL-MCNC: 1.35 MG/DL — HIGH (ref 0.5–1.3)
EGFR: 56 ML/MIN/1.73M2 — LOW
GLUCOSE SERPL-MCNC: 85 MG/DL — SIGNIFICANT CHANGE UP (ref 70–99)
HCT VFR BLD CALC: 26.5 % — LOW (ref 39–50)
HGB BLD-MCNC: 8.2 G/DL — LOW (ref 13–17)
MAGNESIUM SERPL-MCNC: 1.6 MG/DL — SIGNIFICANT CHANGE UP (ref 1.6–2.6)
MCHC RBC-ENTMCNC: 27.4 PG — SIGNIFICANT CHANGE UP (ref 27–34)
MCHC RBC-ENTMCNC: 30.9 GM/DL — LOW (ref 32–36)
MCV RBC AUTO: 88.6 FL — SIGNIFICANT CHANGE UP (ref 80–100)
NRBC # BLD: 0 /100 WBCS — SIGNIFICANT CHANGE UP (ref 0–0)
PHOSPHATE SERPL-MCNC: 3 MG/DL — SIGNIFICANT CHANGE UP (ref 2.5–4.5)
PLATELET # BLD AUTO: 305 K/UL — SIGNIFICANT CHANGE UP (ref 150–400)
POTASSIUM SERPL-MCNC: 3.3 MMOL/L — LOW (ref 3.5–5.3)
POTASSIUM SERPL-SCNC: 3.3 MMOL/L — LOW (ref 3.5–5.3)
PROT SERPL-MCNC: 5.9 G/DL — LOW (ref 6–8.3)
RBC # BLD: 2.99 M/UL — LOW (ref 4.2–5.8)
RBC # FLD: 18.9 % — HIGH (ref 10.3–14.5)
SODIUM SERPL-SCNC: 142 MMOL/L — SIGNIFICANT CHANGE UP (ref 135–145)
URATE SERPL-MCNC: 5.3 MG/DL — SIGNIFICANT CHANGE UP (ref 3.4–8.8)
WBC # BLD: 8.84 K/UL — SIGNIFICANT CHANGE UP (ref 3.8–10.5)
WBC # FLD AUTO: 8.84 K/UL — SIGNIFICANT CHANGE UP (ref 3.8–10.5)

## 2022-11-06 PROCEDURE — 99232 SBSQ HOSP IP/OBS MODERATE 35: CPT | Mod: GC

## 2022-11-06 RX ORDER — LANOLIN ALCOHOL/MO/W.PET/CERES
5 CREAM (GRAM) TOPICAL ONCE
Refills: 0 | Status: COMPLETED | OUTPATIENT
Start: 2022-11-06 | End: 2022-11-06

## 2022-11-06 RX ORDER — POTASSIUM CHLORIDE 20 MEQ
40 PACKET (EA) ORAL ONCE
Refills: 0 | Status: COMPLETED | OUTPATIENT
Start: 2022-11-06 | End: 2022-11-06

## 2022-11-06 RX ADMIN — Medication 400 MILLIGRAM(S): at 05:44

## 2022-11-06 RX ADMIN — ATORVASTATIN CALCIUM 40 MILLIGRAM(S): 80 TABLET, FILM COATED ORAL at 21:06

## 2022-11-06 RX ADMIN — CHLORHEXIDINE GLUCONATE 1 APPLICATION(S): 213 SOLUTION TOPICAL at 13:21

## 2022-11-06 RX ADMIN — Medication 40 MILLIEQUIVALENT(S): at 08:43

## 2022-11-06 RX ADMIN — Medication 100 MILLIGRAM(S): at 05:44

## 2022-11-06 RX ADMIN — Medication 1 TABLET(S): at 13:16

## 2022-11-06 RX ADMIN — APIXABAN 2.5 MILLIGRAM(S): 2.5 TABLET, FILM COATED ORAL at 05:44

## 2022-11-06 RX ADMIN — Medication 5 MILLIGRAM(S): at 21:05

## 2022-11-06 RX ADMIN — Medication 400 MILLIGRAM(S): at 17:38

## 2022-11-06 RX ADMIN — Medication 325 MILLIGRAM(S): at 08:43

## 2022-11-06 RX ADMIN — APIXABAN 2.5 MILLIGRAM(S): 2.5 TABLET, FILM COATED ORAL at 17:45

## 2022-11-06 NOTE — PROGRESS NOTE ADULT - ATTENDING COMMENTS
72M with afib (eliquis), HTN,  complete heart block s/p PPM, HLD, HFrEF (30% in 09/2022), and DLBCL (tx with R-CHOP in 2003, with relapse in 2009 treated with BR) now with recently diagnosed grade 3 follicular lymphoma BIBEMS with fever and AMS after being found down by his daughter admitted for acute metabolic encephalopathy with sepsis 2/2 pneumonia now s/p 7 days zosyn with improvement  Now awaiting [facility]  STEPHEN improving daily  Replete electrolytes  Rest per resident documentation above    [facility] Division of Hospital Medicine  Vaishali Lemus MD  Pager (M-F, 8A-5P): [phone]  Other Times:  [phone].

## 2022-11-06 NOTE — PROGRESS NOTE ADULT - PROBLEM SELECTOR PROBLEM 10
Patient transferred to ER for further evaluation of syncopal attack.   Atrial flutter, unspecified type

## 2022-11-06 NOTE — PROGRESS NOTE ADULT - SUBJECTIVE AND OBJECTIVE BOX
***************************************************************  Jaiden  Foreign PGY1  Internal Medicine   ***************************************************************    NIK GRIMM  72y  MRN: [id number]    Patient is a 72y old  Male who presents with a chief complaint of hypoxia (05 Nov 2022 07:25)      Subjective: no events ON. Denies fever, CP, SOB, abn pain, N/V, dysuria. Tolerating diet.      MEDICATIONS  (STANDING):  acyclovir   Oral Tab/Cap 400 milliGRAM(s) Oral two times a day  allopurinol 50 milliGRAM(s) Oral <User Schedule>  apixaban 2.5 milliGRAM(s) Oral two times a day  atorvastatin 40 milliGRAM(s) Oral at bedtime  chlorhexidine 2% Cloths 1 Application(s) Topical daily  ferrous    sulfate 325 milliGRAM(s) Oral <User Schedule>  metoprolol succinate  milliGRAM(s) Oral daily  multivitamin 1 Tablet(s) Oral daily    MEDICATIONS  (PRN):  senna 2 Tablet(s) Oral at bedtime PRN Constipation      Objective:    Vitals: Vital Signs Last 24 Hrs  T(C): 36.3 (11-06-22 @ 05:49), Max: 36.6 (11-05-22 @ 20:19)  T(F): 97.4 (11-06-22 @ 05:49), Max: 97.9 (11-05-22 @ 20:19)  HR: 72 (11-06-22 @ 05:49) (63 - 80)  BP: 166/66 (11-06-22 @ 05:49) (132/69 - 166/66)  BP(mean): --  RR: 18 (11-06-22 @ 05:49) (18 - 18)  SpO2: 99% (11-06-22 @ 05:49) (95% - 99%)            I&O's Summary    04 Nov 2022 07:01  -  05 Nov 2022 07:00  --------------------------------------------------------  IN: 120 mL / OUT: 250 mL / NET: -130 mL    05 Nov 2022 08:01  -  06 Nov 2022 06:39  --------------------------------------------------------  IN: 720 mL / OUT: 1500 mL / NET: -780 mL        PHYSICAL EXAM:  GENERAL: NAD  HEAD:  Atraumatic, Normocephalic  EYES: EOMI, conjunctiva and sclera clear  CHEST/LUNG: Clear to auscultation bilaterally; No rales, rhonchi, wheezing, or rubs  HEART: Regular rate and rhythm; No murmurs, rubs, or gallops  ABDOMEN: Soft, Nontender, Nondistended;   SKIN: No rashes or lesions  NERVOUS SYSTEM:  Alert & Oriented X3, no focal deficits    LABS:  11-05    141  |  106  |  34<H>  ----------------------------<  113<H>  3.4<L>   |  21<L>  |  1.64<H>  11-04    140  |  105  |  38<H>  ----------------------------<  94  3.4<L>   |  21<L>  |  1.73<H>    Ca    8.6      05 Nov 2022 05:58  Ca    8.4      04 Nov 2022 07:06  Phos  2.2     11-05  Mg     1.5     11-05    TPro  6.1  /  Alb  2.9<L>  /  TBili  0.7  /  DBili  x   /  AST  45<H>  /  ALT  43  /  AlkPhos  206<H>  11-05  TPro  5.7<L>  /  Alb  2.8<L>  /  TBili  0.7  /  DBili  x   /  AST  43<H>  /  ALT  43  /  AlkPhos  174<H>  11-04                                              8.3    8.58  )-----------( 279      ( 05 Nov 2022 05:59 )             25.6                         7.9    7.16  )-----------( 216      ( 04 Nov 2022 07:10 )             25.2     CAPILLARY BLOOD GLUCOSE          RADIOLOGY & ADDITIONAL TESTS:    Imaging Personally Reviewed:  [x ] YES  [ ] NO    Consultants involved in case:   Consultant(s) Notes Reviewed:  [ x] YES  [ ] NO:   Care Discussed with Consultants/Other Providers [x ] YES  [ ] NO

## 2022-11-06 NOTE — PROGRESS NOTE ADULT - ASSESSMENT
72 year old male with afib (on eliquis), HTN, complete heart block s/p PPM, HLD, HFrEF (30% in 09/2022), and DLBCL (tx with R-CHOP in 2003, with relapse in 2009 treated with BR) now with recently diagnosed grade 3 follicular lymphoma BIBEMS with fever and AMS after being found down by his daughter this AM. Presentation concerning for toxic metabolic encephalopathy in setting of sepsis possible 2/2 pneumonia vs other infectious etiology (GI), being treated w/ abx, course c/b STEPHEN now improved.

## 2022-11-06 NOTE — PROGRESS NOTE ADULT - PROBLEM SELECTOR PLAN 3
- Admitted with sepsis. Pt on chemotherapy for non-Hodgkins Lymphoma making him immunocompromised.  - Sepsis cause unclear, could be PNA, r/o other etiology (GI)   - Pulm consulted, appreciate recommendations               - Defer thoracentesis for now due to similar findings on prior CT, suspect less contribution to fever.                - c/w zosyn for empiric coverage (10/29-10/4) for 7 days total, completed and afebriel ,stable.   -c/w home acyclovir ppx

## 2022-11-06 NOTE — PROGRESS NOTE ADULT - SUBJECTIVE AND OBJECTIVE BOX
***************************************************************  Jaidenasencio (Ji-Cheng) [facility] PGY1  Internal Medicine   ***************************************************************    NIK GRIMM  72y  MRN: [id number]    72 year old male with afib (on eliquis), HTN, complete heart block s/p PPM, HLD, HFrEF (30% in 09/2022), and DLBCL (tx with R-[facility] in 2003, with relapse in 2009 treated with BR) now with recently diagnosed grade 3 follicular lymphoma BIBEMS with fever and AMS after being found down by his daughter this AM. Presentation concerning for toxic metabolic encephalopathy in setting of sepsis possible 2/2 pneumonia vs other infectious etiology (GI), being treated w/ abx, course c/b STEPHEN now improved.     Subjective: NAEO. No SOB today. No complaints.     MEDICATIONS  (STANDING):  acyclovir   Oral Tab/Cap 400 milliGRAM(s) Oral two times a day  allopurinol 50 milliGRAM(s) Oral <User Schedule>  apixaban 2.5 milliGRAM(s) Oral two times a day  atorvastatin 40 milliGRAM(s) Oral at bedtime  chlorhexidine 2% Cloths 1 Application(s) Topical daily  ferrous    sulfate 325 milliGRAM(s) Oral <User Schedule>  metoprolol succinate  milliGRAM(s) Oral daily  multivitamin 1 Tablet(s) Oral daily    MEDICATIONS  (PRN):  senna 2 Tablet(s) Oral at bedtime PRN Constipation      Objective:    Vitals: Vital Signs Last 24 Hrs  T(C): 36.3 (11-06-22 @ 05:49), Max: 36.6 (11-05-22 @ 20:19)  T(F): 97.4 (11-06-22 @ 05:49), Max: 97.9 (11-05-22 @ 20:19)  HR: 72 (11-06-22 @ 05:49) (63 - 80)  BP: 166/66 (11-06-22 @ 05:49) (132/69 - 166/66)  BP(mean): --  RR: 18 (11-06-22 @ 05:49) (18 - 18)  SpO2: 99% (11-06-22 @ 05:49) (95% - 99%)            I&O's Summary    05 Nov 2022 08:01  -  06 Nov 2022 07:00  --------------------------------------------------------  IN: 720 mL / OUT: 1700 mL / NET: -980 mL        PHYSICAL EXAM:  GENERAL: NAD  HEAD:  Atraumatic, Normocephalic  EYES: EOMI, conjunctiva and sclera clear  CHEST/LUNG: Clear to auscultation bilaterally; No rales, rhonchi, wheezing, or rubs  HEART: Regular rate and rhythm; No murmurs, rubs, or gallops  ABDOMEN: Soft, Nontender, Nondistended;   SKIN: No rashes or lesions  NERVOUS SYSTEM:  Alert & Oriented X2, no focal deficits    LABS:  11-06    142  |  107  |  29<H>  ----------------------------<  85  3.3<L>   |  23  |  1.35<H>  11-05    141  |  106  |  34<H>  ----------------------------<  113<H>  3.4<L>   |  21<L>  |  1.64<H>  11-04    140  |  105  |  38<H>  ----------------------------<  94  3.4<L>   |  21<L>  |  1.73<H>    Ca    8.4      06 Nov 2022 07:10  Ca    8.6      05 Nov 2022 05:58  Ca    8.4      04 Nov 2022 07:06  Phos  3.0     11-06  Mg     1.6     11-06    TPro  5.9<L>  /  Alb  2.8<L>  /  TBili  0.6  /  DBili  x   /  AST  34  /  ALT  33  /  AlkPhos  163<H>  11-06  TPro  6.1  /  Alb  2.9<L>  /  TBili  0.7  /  DBili  x   /  AST  45<H>  /  ALT  43  /  AlkPhos  206<H>  11-05  TPro  5.7<L>  /  Alb  2.8<L>  /  TBili  0.7  /  DBili  x   /  AST  43<H>  /  ALT  43  /  AlkPhos  174<H>  11-04                                              8.2    8.84  )-----------( 305      ( 06 Nov 2022 07:11 )             26.5                         8.3    8.58  )-----------( 279      ( 05 Nov 2022 05:59 )             25.6                         7.9    7.16  )-----------( 216      ( 04 Nov 2022 07:10 )             25.2     CAPILLARY BLOOD GLUCOSE          RADIOLOGY & ADDITIONAL TESTS:    Imaging Personally Reviewed:  [x ] YES  [ ] NO    Consultants involved in case:   Consultant(s) Notes Reviewed:  [ x] YES  [ ] NO:   Care Discussed with Consultants/Other Providers [x ] YES  [ ] NO

## 2022-11-07 ENCOUNTER — TRANSCRIPTION ENCOUNTER (OUTPATIENT)
Age: 72
End: 2022-11-07

## 2022-11-07 LAB
ALBUMIN SERPL ELPH-MCNC: 2.5 G/DL — LOW (ref 3.3–5)
ALP SERPL-CCNC: 205 U/L — HIGH (ref 40–120)
ALT FLD-CCNC: 29 U/L — SIGNIFICANT CHANGE UP (ref 10–45)
ANION GAP SERPL CALC-SCNC: 15 MMOL/L — SIGNIFICANT CHANGE UP (ref 5–17)
AST SERPL-CCNC: 45 U/L — HIGH (ref 10–40)
BILIRUB SERPL-MCNC: 0.6 MG/DL — SIGNIFICANT CHANGE UP (ref 0.2–1.2)
BUN SERPL-MCNC: 31 MG/DL — HIGH (ref 7–23)
CALCIUM SERPL-MCNC: 8.4 MG/DL — SIGNIFICANT CHANGE UP (ref 8.4–10.5)
CHLORIDE SERPL-SCNC: 107 MMOL/L — SIGNIFICANT CHANGE UP (ref 96–108)
CO2 SERPL-SCNC: 16 MMOL/L — LOW (ref 22–31)
CREAT SERPL-MCNC: 1.37 MG/DL — HIGH (ref 0.5–1.3)
EGFR: 55 ML/MIN/1.73M2 — LOW
GLUCOSE SERPL-MCNC: 76 MG/DL — SIGNIFICANT CHANGE UP (ref 70–99)
HCT VFR BLD CALC: 27.5 % — LOW (ref 39–50)
HGB BLD-MCNC: 8.4 G/DL — LOW (ref 13–17)
MAGNESIUM SERPL-MCNC: 1.5 MG/DL — LOW (ref 1.6–2.6)
MCHC RBC-ENTMCNC: 27.4 PG — SIGNIFICANT CHANGE UP (ref 27–34)
MCHC RBC-ENTMCNC: 30.5 GM/DL — LOW (ref 32–36)
MCV RBC AUTO: 89.6 FL — SIGNIFICANT CHANGE UP (ref 80–100)
NRBC # BLD: 0 /100 WBCS — SIGNIFICANT CHANGE UP (ref 0–0)
PHOSPHATE SERPL-MCNC: 3.4 MG/DL — SIGNIFICANT CHANGE UP (ref 2.5–4.5)
PLATELET # BLD AUTO: 355 K/UL — SIGNIFICANT CHANGE UP (ref 150–400)
POTASSIUM SERPL-MCNC: 4.7 MMOL/L — SIGNIFICANT CHANGE UP (ref 3.5–5.3)
POTASSIUM SERPL-SCNC: 4.7 MMOL/L — SIGNIFICANT CHANGE UP (ref 3.5–5.3)
PROT SERPL-MCNC: 6.3 G/DL — SIGNIFICANT CHANGE UP (ref 6–8.3)
RBC # BLD: 3.07 M/UL — LOW (ref 4.2–5.8)
RBC # FLD: 19.3 % — HIGH (ref 10.3–14.5)
SARS-COV-2 RNA SPEC QL NAA+PROBE: SIGNIFICANT CHANGE UP
SODIUM SERPL-SCNC: 138 MMOL/L — SIGNIFICANT CHANGE UP (ref 135–145)
WBC # BLD: 9.94 K/UL — SIGNIFICANT CHANGE UP (ref 3.8–10.5)
WBC # FLD AUTO: 9.94 K/UL — SIGNIFICANT CHANGE UP (ref 3.8–10.5)

## 2022-11-07 PROCEDURE — 99232 SBSQ HOSP IP/OBS MODERATE 35: CPT | Mod: GC

## 2022-11-07 RX ORDER — MAGNESIUM SULFATE 500 MG/ML
2 VIAL (ML) INJECTION ONCE
Refills: 0 | Status: COMPLETED | OUTPATIENT
Start: 2022-11-07 | End: 2022-11-07

## 2022-11-07 RX ORDER — APIXABAN 2.5 MG/1
5 TABLET, FILM COATED ORAL EVERY 12 HOURS
Refills: 0 | Status: DISCONTINUED | OUTPATIENT
Start: 2022-11-07 | End: 2022-11-08

## 2022-11-07 RX ORDER — ALLOPURINOL 300 MG
50 TABLET ORAL DAILY
Refills: 0 | Status: DISCONTINUED | OUTPATIENT
Start: 2022-11-07 | End: 2022-11-08

## 2022-11-07 RX ORDER — FERROUS SULFATE 325(65) MG
1 TABLET ORAL
Qty: 0 | Refills: 0 | DISCHARGE
Start: 2022-11-07

## 2022-11-07 RX ADMIN — Medication 25 GRAM(S): at 10:40

## 2022-11-07 RX ADMIN — APIXABAN 2.5 MILLIGRAM(S): 2.5 TABLET, FILM COATED ORAL at 05:21

## 2022-11-07 RX ADMIN — Medication 400 MILLIGRAM(S): at 05:22

## 2022-11-07 RX ADMIN — APIXABAN 5 MILLIGRAM(S): 2.5 TABLET, FILM COATED ORAL at 17:58

## 2022-11-07 RX ADMIN — Medication 400 MILLIGRAM(S): at 17:42

## 2022-11-07 RX ADMIN — CHLORHEXIDINE GLUCONATE 1 APPLICATION(S): 213 SOLUTION TOPICAL at 10:38

## 2022-11-07 RX ADMIN — Medication 100 MILLIGRAM(S): at 05:21

## 2022-11-07 RX ADMIN — Medication 1 TABLET(S): at 10:39

## 2022-11-07 RX ADMIN — ATORVASTATIN CALCIUM 40 MILLIGRAM(S): 80 TABLET, FILM COATED ORAL at 22:27

## 2022-11-07 NOTE — PROGRESS NOTE ADULT - PROBLEM SELECTOR PLAN 11
DVT PPx: Eliquis  Diet: Soft and bite sized diet, pending speech and swallow evaluation.   Bowel Regimen:   Code: Full  Dispo: [facility]  Pharmacy:  PCP:   Communication: Spoke with daughter 11/4 DVT PPx: Eliquis  Diet: DASH/TLC diet  Bowel Regimen:   Code: Full  Dispo: [facility]  Pharmacy:  PCP:   Communication: Spoke with daughter Vonnie 11/6

## 2022-11-07 NOTE — CHART NOTE - NSCHARTNOTEFT_GEN_A_CORE
Nutrition Follow Up Note  Patient seen for: Malnutrition Follow Up. Chart reviewed, events noted.     Per chart, pt is a "72 year old male with afib (on eliquis), HTN, complete heart block s/p PPM, HLD, HFrEF (30% in 2022), and DLBCL (tx with [facility] in , with relapse in  treated with BR) now with recently diagnosed grade 3 follicular lymphoma BIBEMS with fever and AMS after being found down by his daughter this AM. Presentation concerning for toxic metabolic encephalopathy in setting of sepsis possible 2/2 pneumonia vs other infectious etiology (GI), being treated w/ abx, course c/b STEPHEN undergoing additional work-up."    Source: [x] Patient       [x] Medical Record        [] RN        [] Family at bedside       [] Other:    Diet Order:   Diet, DASH/TLC:   Sodium & Cholesterol Restricted (22)    - Is current order appropriate/adequate? [] Yes  [x]  No: RD to provide oral nutrition supplements    - PO intake :   [] >75%  Adequate    [x] 50-75%  Fair       [] <50%  Poor - In house, pt reports poor-fair PO intake. Flowsheets indicate fair (50-75%) PO intake.     - Nutrition-related concerns:      - Pt endorses his appetite is "so-so" and is "trying to" increase PO intake; amenable to trying oral nutrition supplement - reports he has not received any yet.       - Nutritionally Pertinent Meds: MVI, ferrous sulfate, atorvastatin.       - Nutritionally Pertinent Labs: BG WNL today; a1c 6.2% (10/29).     GI: Pt denies N/V/D/C. Per chart, last BM .  Bowel Regimen? [x] Yes: senna.     Weights:   Daily Weight in k.5 (), Weight in k.8 (), Weight in k (), Weight in k.2 (10-31)  Dosing weight: 66.2 kg (10-28)  RD to monitor wt trends as able.     Pertinent Labs:  @ 07:12: Na 138, BUN 31<H>, Cr 1.37<H>, BG 76, K+ 4.7, Phos 3.4, Mg 1.5<L>, Alk Phos 205<H>, ALT/SGPT 29, AST/SGOT 45<H>.    A1C with Estimated Average Glucose Result: 6.2 % (10-29-22 @ 05:59)  A1C with Estimated Average Glucose Result: 5.4 % (22 @ 05:55)      Skin per nursing documentation: No pressure injuries noted.  Edema per nursing documentation: No edema noted.     Estimated Needs:   [x] no change since previous assessment:   Energy: 8015-7812 kcal/day (30-35 kcal/kg)  Protein: 79.32-99.15 g pro/day (1.2-1.5 g pro/kg)   Fluid needs deferred to team.   Wt used for estimating needs: 66.2 kg (dosing wt)    Previous Nutrition Diagnosis: 1) Severe Malnutrition  Nutrition Diagnosis is: [x] ongoing  [] resolved [] not applicable - being addressed with PO diet, assistance/encouragement with PO intake, oral nutrition supplementation, micronutrient supplementation.     Nutrition Care Plan:  [x] In Progress  [] Achieved  [] Not applicable    New Nutrition Diagnosis: [x] Not applicable    Nutrition Interventions:     Education Provided   [x] Yes:  Encouraged adequate consumption of meals/supplements to optimize protein-energy intake. Encouraged small/frequent meals, nutrient dense snacks, prioritizing protein foods at meal time. Pt made aware RD remains available PRN.     Recommendations:      1) Continue DASH/TLC diet as tolerated. Defer texture/consistency to SLP/team.   2) Recommend providing Ensure Enlive 3x/day to promote adequate PO intake.   3) Continue providing MVI daily to prevent micronutrient deficiencies pending no medical contraindications.  4) Continue to monitor PO intake, weight, labs, skin, GI status, and diet.  5) Provide assistance/encouragement with meals PRN to promote adequate PO intake.   6) Nutrition care plan to remain consistent with pt GOC.    Monitoring and Evaluation:   Continue to monitor nutritional intake, tolerance to diet prescription, weights, labs, skin integrity    RD remains available upon request and will follow up per protocol  Lorie Ayoub RDN #[id number]

## 2022-11-07 NOTE — PROGRESS NOTE ADULT - ASSESSMENT
72 year old male with afib (on eliquis), HTN, complete heart block s/p PPM, HLD, HFrEF (30% in 09/2022), and DLBCL (tx with [facility] in 2003, with relapse in 2009 treated with BR) now with recently diagnosed grade 3 follicular lymphoma BIBEMS with fever and AMS after being found down by his daughter this AM. Presentation concerning for toxic metabolic encephalopathy in setting of sepsis possible 2/2 pneumonia vs other infectious etiology (GI), being treated w/ abx, course c/b STEPHEN now improved.  72 year old male with afib (on eliquis), HTN, complete heart block s/p PPM, HLD, HFrEF (30% in 09/2022), and DLBCL (tx with [facility] in 2003, with relapse in 2009 treated with BR) now with recently diagnosed grade 3 follicular lymphoma BIBEMS with fever and AMS after being found down by his daughter this AM. Presentation concerning for toxic metabolic encephalopathy in setting of sepsis possible 2/2 pneumonia vs other infectious etiology (GI), being treated w/ abx, course c/b STEPHEN now improved awaiting DC to [facility].

## 2022-11-07 NOTE — PROGRESS NOTE ADULT - ATTENDING COMMENTS
72 year old male with afib (eliquis), HTN,  complete heart block s/p PPM, HLD, HFrEF (30% in 09/2022), and DLBCL (tx with R-[facility] in 2003, with relapse in 2009 treated with BR) now with recently diagnosed grade 3 follicular lymphoma BIBEMS with fever and AMS after being found down by his daughter admitted for acute metabolic encephalopathy with sepsis 2/2 pneumonia s/p 7 days zosyn. Pulm consulted for loculated effusion on CT but POCUS showed no tapable pocket. PPM interrogated. TTE- EF 30% with mod pulm htn. Cr improving. Lethargy improved with mental status near baseline.   #STEPHEN  #Fe deficiency Anemia  #Fall  #Non Hodgkins Lymphoma    -s/p 7 days of zosyn  -per heme onc, patient can continue with next dose of chemo from [facility] through peripheral IV. Daughter refused PICC line placement and no plans for inpatient mediport by IR  -PT -[facility]    d/w HS3  Dispo planning to subacute rehab. No plans for PICC line or mediport.     Lindy Gonzalez MD  Division of Hospital Medicine  Available on Microsoft Teams .

## 2022-11-07 NOTE — DISCHARGE NOTE NURSING/CASE MANAGEMENT/SOCIAL WORK - NSDCFUADDAPPT_GEN_ALL_CORE_FT
Please follow-up with your primary care physician (Dr. Hickman), your oncologist (Dr. Cordova), and the interventional radiology team to discuss your recent admission, arrange your Mediport placement, and plan your chemotherapy. Please call the [phone] number to book your Mediport placement with interventional radiology.

## 2022-11-07 NOTE — DISCHARGE NOTE NURSING/CASE MANAGEMENT/SOCIAL WORK - PATIENT PORTAL LINK FT
You can access the FollowMyHealth Patient Portal offered by [facility] by registering at the following website: [email or web address]. By joining [facility]’s FollowMyHealth portal, you will also be able to view your health information using other applications (apps) compatible with our system.

## 2022-11-07 NOTE — CHART NOTE - NSCHARTNOTEFT_GEN_A_CORE
Chart reviewed & discussed with IR attending; ok to place bedside picc/midline while on AC. Chart reviewed & discussed with IR attending; ok to place bedside picc/midline while on AC.    - port placement in an outpatient setting results in a longer infection-free survival time for a wide variety of placement indications compared with placement in an inpatient setting therefore can coordinate placing the chest port as an outpatient (minimum 5 days post discharge)  - please call our booking office at [phone] to schedule   - d/w primary team    Lucy Love NP  Available on Teams

## 2022-11-07 NOTE — CHART NOTE - NSCHARTNOTEFT_GEN_A_CORE
HEMATOLOGY FELLOW NOTE    Patient is due for chemotherapy on 11/11/22. As of now, he does not have access. His daughter is deferring a PICC inpatient. Patient cannot have a mediport placed inpatient.     Once discharged, we will arrange for outpatient placement of a mediport so that patient can continue chemotherapy.     Please page or call with questions.    Brittaney Ambrosio MD  Hematology/Oncology Fellow, PGY-6  Pager: [phone]  After 5pm and on weekends please page on-call fellow

## 2022-11-07 NOTE — PROGRESS NOTE ADULT - PROBLEM SELECTOR PLAN 1
- Possibly prerenal STEPHEN i/s/o sepsis and diarrhea vs ATN vs contrast nephropathy. Baseline SCr 1.5. FeNa suggesting pre-renal but unchanged Cr despite maint fluids. Bladder scans <400.   - Likely in setting of poor PO.  - Cr improving s/p fluids, encouraged PO - Possibly prerenal STEPHEN i/s/o sepsis and diarrhea vs ATN vs contrast nephropathy. Baseline SCr 1.5. FeNa suggesting pre-renal but unchanged Cr despite maint fluids. Bladder scans <400.   - Likely in setting of poor PO.  - Cr improving and stable, encouraged PO

## 2022-11-07 NOTE — PROGRESS NOTE ADULT - PROBLEM SELECTOR PLAN 3
- Admitted with sepsis. Pt on chemotherapy for non-Hodgkins Lymphoma making him immunocompromised.  - Sepsis cause unclear, could be PNA, r/o other etiology (GI)   - Pulm consulted, appreciate recommendations               - Defer thoracentesis for now due to similar findings on prior CT, suspect less contribution to fever.                - c/w zosyn for empiric coverage (10/29-10/4) for 7 days total, completed and afebriel ,stable.   -c/w home acyclovir ppx - Admitted with sepsis. Pt on chemotherapy for non-Hodgkins Lymphoma making him immunocompromised.  - Sepsis cause unclear, could be PNA, r/o other etiology (GI)   - Pulm consulted, appreciate recommendations               - Defer thoracentesis for now due to similar findings on prior CT, suspect less contribution to fever.                - c/w zosyn for empiric coverage (10/29-10/4) for 7 days total, completed and afebrile, stable.   -c/w home acyclovir ppx

## 2022-11-07 NOTE — PROGRESS NOTE ADULT - SUBJECTIVE AND OBJECTIVE BOX
***************************************************************  Jaidenasencio (Ji-Cheng) [employer] PGY1  Internal Medicine   ***************************************************************    NIK GRIMM  72y  MRN: [id number]    Patient is a 72y old  Male who presents with a chief complaint of hypoxia (06 Nov 2022 10:02)      Subjective: no events ON. Denies fever, CP, SOB, abn pain, N/V, dysuria. Tolerating diet.      MEDICATIONS  (STANDING):  acyclovir   Oral Tab/Cap 400 milliGRAM(s) Oral two times a day  allopurinol 50 milliGRAM(s) Oral <User Schedule>  apixaban 2.5 milliGRAM(s) Oral two times a day  atorvastatin 40 milliGRAM(s) Oral at bedtime  chlorhexidine 2% Cloths 1 Application(s) Topical daily  ferrous    sulfate 325 milliGRAM(s) Oral <User Schedule>  metoprolol succinate  milliGRAM(s) Oral daily  multivitamin 1 Tablet(s) Oral daily    MEDICATIONS  (PRN):  senna 2 Tablet(s) Oral at bedtime PRN Constipation      Objective:    Vitals: Vital Signs Last 24 Hrs  T(C): 36.5 (11-07-22 @ 05:24), Max: 36.6 (11-07-22 @ 01:00)  T(F): 97.7 (11-07-22 @ 05:24), Max: 97.8 (11-07-22 @ 01:00)  HR: 96 (11-07-22 @ 05:24) (76 - 96)  BP: 158/68 (11-07-22 @ 05:24) (103/69 - 158/68)  BP(mean): --  RR: 18 (11-07-22 @ 05:24) (18 - 18)  SpO2: 97% (11-07-22 @ 05:24) (97% - 99%)            I&O's Summary    06 Nov 2022 07:01  -  07 Nov 2022 07:00  --------------------------------------------------------  IN: 0 mL / OUT: 500 mL / NET: -500 mL        PHYSICAL EXAM:  GENERAL: NAD  HEAD:  Atraumatic, Normocephalic  EYES: EOMI, conjunctiva and sclera clear  CHEST/LUNG: Clear to auscultation bilaterally; No rales, rhonchi, wheezing, or rubs  HEART: Regular rate and rhythm; No murmurs, rubs, or gallops  ABDOMEN: Soft, Nontender, Nondistended;   SKIN: No rashes or lesions  NERVOUS SYSTEM:  Alert & Oriented X3, no focal deficits    LABS:  11-06    142  |  107  |  29<H>  ----------------------------<  85  3.3<L>   |  23  |  1.35<H>  11-05    141  |  106  |  34<H>  ----------------------------<  113<H>  3.4<L>   |  21<L>  |  1.64<H>  11-04    140  |  105  |  38<H>  ----------------------------<  94  3.4<L>   |  21<L>  |  1.73<H>    Ca    8.4      06 Nov 2022 07:10  Ca    8.6      05 Nov 2022 05:58  Ca    8.4      04 Nov 2022 07:06  Phos  3.0     11-06  Mg     1.6     11-06    TPro  5.9<L>  /  Alb  2.8<L>  /  TBili  0.6  /  DBili  x   /  AST  34  /  ALT  33  /  AlkPhos  163<H>  11-06  TPro  6.1  /  Alb  2.9<L>  /  TBili  0.7  /  DBili  x   /  AST  45<H>  /  ALT  43  /  AlkPhos  206<H>  11-05  TPro  5.7<L>  /  Alb  2.8<L>  /  TBili  0.7  /  DBili  x   /  AST  43<H>  /  ALT  43  /  AlkPhos  174<H>  11-04                                              8.2    8.84  )-----------( 305      ( 06 Nov 2022 07:11 )             26.5                         8.3    8.58  )-----------( 279      ( 05 Nov 2022 05:59 )             25.6                         7.9    7.16  )-----------( 216      ( 04 Nov 2022 07:10 )             25.2     CAPILLARY BLOOD GLUCOSE          RADIOLOGY & ADDITIONAL TESTS:    Imaging Personally Reviewed:  [x ] YES  [ ] NO    Consultants involved in case:   Consultant(s) Notes Reviewed:  [ x] YES  [ ] NO:   Care Discussed with Consultants/Other Providers [x ] YES  [ ] NO         Chronic constrictive pericarditis, unspecified type Chronic constrictive pericarditis, unspecified type Chronic constrictive pericarditis, unspecified type Chronic constrictive pericarditis, unspecified type Chronic constrictive pericarditis, unspecified type Chronic constrictive pericarditis, unspecified type Chronic constrictive pericarditis, unspecified type Chronic constrictive pericarditis, unspecified type Chronic constrictive pericarditis, unspecified type ***************************************************************  Jaidenasencio (Ji-Cheng) [facility] PGY1  Internal Medicine   ***************************************************************    NIK GRIMM  72y  MRN: [id number]    72 year old male with afib (on eliquis), HTN, complete heart block s/p PPM, HLD, HFrEF (30% in 09/2022), and DLBCL (tx with [facility] in 2003, with relapse in 2009 treated with BR) now with recently diagnosed grade 3 follicular lymphoma BIBEMS with fever and AMS after being found down by his daughter this AM. Presentation concerning for toxic metabolic encephalopathy in setting of sepsis possible 2/2 pneumonia vs other infectious etiology (GI), being treated w/ abx, course c/b STEPHEN now improved awaiting DC to [facility].     Subjective: NAEO. comfortable today with no SOB or complaints.     MEDICATIONS  (STANDING):  acyclovir   Oral Tab/Cap 400 milliGRAM(s) Oral two times a day  allopurinol 50 milliGRAM(s) Oral <User Schedule>  apixaban 2.5 milliGRAM(s) Oral two times a day  atorvastatin 40 milliGRAM(s) Oral at bedtime  chlorhexidine 2% Cloths 1 Application(s) Topical daily  ferrous    sulfate 325 milliGRAM(s) Oral <User Schedule>  metoprolol succinate  milliGRAM(s) Oral daily  multivitamin 1 Tablet(s) Oral daily    MEDICATIONS  (PRN):  senna 2 Tablet(s) Oral at bedtime PRN Constipation      Objective:    Vitals: Vital Signs Last 24 Hrs  T(C): 36.5 (11-07-22 @ 05:24), Max: 36.6 (11-07-22 @ 01:00)  T(F): 97.7 (11-07-22 @ 05:24), Max: 97.8 (11-07-22 @ 01:00)  HR: 96 (11-07-22 @ 05:24) (76 - 96)  BP: 158/68 (11-07-22 @ 05:24) (103/69 - 158/68)  BP(mean): --  RR: 18 (11-07-22 @ 05:24) (18 - 18)  SpO2: 97% (11-07-22 @ 05:24) (97% - 99%)            I&O's Summary    06 Nov 2022 07:01  -  07 Nov 2022 07:00  --------------------------------------------------------  IN: 0 mL / OUT: 500 mL / NET: -500 mL        PHYSICAL EXAM:  GENERAL: NAD  HEAD:  Atraumatic, Normocephalic  EYES: EOMI, conjunctiva and sclera clear  CHEST/LUNG: Clear to auscultation bilaterally; No rales, rhonchi, wheezing, or rubs  HEART: Regular rate and rhythm; No murmurs, rubs, or gallops  ABDOMEN: Soft, Nontender, Nondistended;   SKIN: No rashes or lesions  NERVOUS SYSTEM:  Alert & Oriented X1, no focal deficits    LABS:  11-06    142  |  107  |  29<H>  ----------------------------<  85  3.3<L>   |  23  |  1.35<H>  11-05    141  |  106  |  34<H>  ----------------------------<  113<H>  3.4<L>   |  21<L>  |  1.64<H>  11-04    140  |  105  |  38<H>  ----------------------------<  94  3.4<L>   |  21<L>  |  1.73<H>    Ca    8.4      06 Nov 2022 07:10  Ca    8.6      05 Nov 2022 05:58  Ca    8.4      04 Nov 2022 07:06  Phos  3.0     11-06  Mg     1.6     11-06    TPro  5.9<L>  /  Alb  2.8<L>  /  TBili  0.6  /  DBili  x   /  AST  34  /  ALT  33  /  AlkPhos  163<H>  11-06  TPro  6.1  /  Alb  2.9<L>  /  TBili  0.7  /  DBili  x   /  AST  45<H>  /  ALT  43  /  AlkPhos  206<H>  11-05  TPro  5.7<L>  /  Alb  2.8<L>  /  TBili  0.7  /  DBili  x   /  AST  43<H>  /  ALT  43  /  AlkPhos  174<H>  11-04                                              8.2    8.84  )-----------( 305      ( 06 Nov 2022 07:11 )             26.5                         8.3    8.58  )-----------( 279      ( 05 Nov 2022 05:59 )             25.6                         7.9    7.16  )-----------( 216      ( 04 Nov 2022 07:10 )             25.2     CAPILLARY BLOOD GLUCOSE          RADIOLOGY & ADDITIONAL TESTS:    Imaging Personally Reviewed:  [x ] YES  [ ] NO    Consultants involved in case:   Consultant(s) Notes Reviewed:  [ x] YES  [ ] NO:   Care Discussed with Consultants/Other Providers [x ] YES  [ ] NO

## 2022-11-08 VITALS
SYSTOLIC BLOOD PRESSURE: 159 MMHG | OXYGEN SATURATION: 95 % | RESPIRATION RATE: 18 BRPM | HEART RATE: 82 BPM | TEMPERATURE: 98 F | DIASTOLIC BLOOD PRESSURE: 71 MMHG

## 2022-11-08 LAB
ALBUMIN SERPL ELPH-MCNC: 3.1 G/DL — LOW (ref 3.3–5)
ALP SERPL-CCNC: 172 U/L — HIGH (ref 40–120)
ALT FLD-CCNC: 29 U/L — SIGNIFICANT CHANGE UP (ref 10–45)
ANION GAP SERPL CALC-SCNC: 12 MMOL/L — SIGNIFICANT CHANGE UP (ref 5–17)
AST SERPL-CCNC: 33 U/L — SIGNIFICANT CHANGE UP (ref 10–40)
BILIRUB SERPL-MCNC: 0.5 MG/DL — SIGNIFICANT CHANGE UP (ref 0.2–1.2)
BUN SERPL-MCNC: 31 MG/DL — HIGH (ref 7–23)
CALCIUM SERPL-MCNC: 8.6 MG/DL — SIGNIFICANT CHANGE UP (ref 8.4–10.5)
CHLORIDE SERPL-SCNC: 105 MMOL/L — SIGNIFICANT CHANGE UP (ref 96–108)
CO2 SERPL-SCNC: 24 MMOL/L — SIGNIFICANT CHANGE UP (ref 22–31)
CREAT SERPL-MCNC: 1.22 MG/DL — SIGNIFICANT CHANGE UP (ref 0.5–1.3)
EGFR: 63 ML/MIN/1.73M2 — SIGNIFICANT CHANGE UP
GLUCOSE SERPL-MCNC: 96 MG/DL — SIGNIFICANT CHANGE UP (ref 70–99)
HCT VFR BLD CALC: 27.6 % — LOW (ref 39–50)
HGB BLD-MCNC: 8.5 G/DL — LOW (ref 13–17)
MAGNESIUM SERPL-MCNC: 1.7 MG/DL — SIGNIFICANT CHANGE UP (ref 1.6–2.6)
MCHC RBC-ENTMCNC: 27.5 PG — SIGNIFICANT CHANGE UP (ref 27–34)
MCHC RBC-ENTMCNC: 30.8 GM/DL — LOW (ref 32–36)
MCV RBC AUTO: 89.3 FL — SIGNIFICANT CHANGE UP (ref 80–100)
NRBC # BLD: 0 /100 WBCS — SIGNIFICANT CHANGE UP (ref 0–0)
PHOSPHATE SERPL-MCNC: 3.3 MG/DL — SIGNIFICANT CHANGE UP (ref 2.5–4.5)
PLATELET # BLD AUTO: 358 K/UL — SIGNIFICANT CHANGE UP (ref 150–400)
POTASSIUM SERPL-MCNC: 3.7 MMOL/L — SIGNIFICANT CHANGE UP (ref 3.5–5.3)
POTASSIUM SERPL-SCNC: 3.7 MMOL/L — SIGNIFICANT CHANGE UP (ref 3.5–5.3)
PROT SERPL-MCNC: 6.4 G/DL — SIGNIFICANT CHANGE UP (ref 6–8.3)
RBC # BLD: 3.09 M/UL — LOW (ref 4.2–5.8)
RBC # FLD: 19.5 % — HIGH (ref 10.3–14.5)
SODIUM SERPL-SCNC: 141 MMOL/L — SIGNIFICANT CHANGE UP (ref 135–145)
URATE SERPL-MCNC: 6.3 MG/DL — SIGNIFICANT CHANGE UP (ref 3.4–8.8)
WBC # BLD: 7.97 K/UL — SIGNIFICANT CHANGE UP (ref 3.8–10.5)
WBC # FLD AUTO: 7.97 K/UL — SIGNIFICANT CHANGE UP (ref 3.8–10.5)

## 2022-11-08 PROCEDURE — 99239 HOSP IP/OBS DSCHRG MGMT >30: CPT | Mod: GC

## 2022-11-08 PROCEDURE — 83735 ASSAY OF MAGNESIUM: CPT

## 2022-11-08 PROCEDURE — 87640 STAPH A DNA AMP PROBE: CPT

## 2022-11-08 PROCEDURE — U0005: CPT

## 2022-11-08 PROCEDURE — 84132 ASSAY OF SERUM POTASSIUM: CPT

## 2022-11-08 PROCEDURE — 82435 ASSAY OF BLOOD CHLORIDE: CPT

## 2022-11-08 PROCEDURE — 82570 ASSAY OF URINE CREATININE: CPT

## 2022-11-08 PROCEDURE — 83540 ASSAY OF IRON: CPT

## 2022-11-08 PROCEDURE — 93306 TTE W/DOPPLER COMPLETE: CPT

## 2022-11-08 PROCEDURE — 87641 MR-STAPH DNA AMP PROBE: CPT

## 2022-11-08 PROCEDURE — 81001 URINALYSIS AUTO W/SCOPE: CPT

## 2022-11-08 PROCEDURE — 96375 TX/PRO/DX INJ NEW DRUG ADDON: CPT

## 2022-11-08 PROCEDURE — 87449 NOS EACH ORGANISM AG IA: CPT

## 2022-11-08 PROCEDURE — 80048 BASIC METABOLIC PNL TOTAL CA: CPT

## 2022-11-08 PROCEDURE — 92610 EVALUATE SWALLOWING FUNCTION: CPT

## 2022-11-08 PROCEDURE — 84466 ASSAY OF TRANSFERRIN: CPT

## 2022-11-08 PROCEDURE — 86901 BLOOD TYPING SEROLOGIC RH(D): CPT

## 2022-11-08 PROCEDURE — 74177 CT ABD & PELVIS W/CONTRAST: CPT | Mod: MA

## 2022-11-08 PROCEDURE — 85014 HEMATOCRIT: CPT

## 2022-11-08 PROCEDURE — 83550 IRON BINDING TEST: CPT

## 2022-11-08 PROCEDURE — 84100 ASSAY OF PHOSPHORUS: CPT

## 2022-11-08 PROCEDURE — 70450 CT HEAD/BRAIN W/O DYE: CPT | Mod: MA

## 2022-11-08 PROCEDURE — 82330 ASSAY OF CALCIUM: CPT

## 2022-11-08 PROCEDURE — 84550 ASSAY OF BLOOD/URIC ACID: CPT

## 2022-11-08 PROCEDURE — 82728 ASSAY OF FERRITIN: CPT

## 2022-11-08 PROCEDURE — 85027 COMPLETE CBC AUTOMATED: CPT

## 2022-11-08 PROCEDURE — 99285 EMERGENCY DEPT VISIT HI MDM: CPT

## 2022-11-08 PROCEDURE — 84295 ASSAY OF SERUM SODIUM: CPT

## 2022-11-08 PROCEDURE — 71045 X-RAY EXAM CHEST 1 VIEW: CPT

## 2022-11-08 PROCEDURE — 86900 BLOOD TYPING SEROLOGIC ABO: CPT

## 2022-11-08 PROCEDURE — 84300 ASSAY OF URINE SODIUM: CPT

## 2022-11-08 PROCEDURE — 86850 RBC ANTIBODY SCREEN: CPT

## 2022-11-08 PROCEDURE — 85610 PROTHROMBIN TIME: CPT

## 2022-11-08 PROCEDURE — 87899 AGENT NOS ASSAY W/OPTIC: CPT

## 2022-11-08 PROCEDURE — 97116 GAIT TRAINING THERAPY: CPT

## 2022-11-08 PROCEDURE — 85730 THROMBOPLASTIN TIME PARTIAL: CPT

## 2022-11-08 PROCEDURE — U0003: CPT

## 2022-11-08 PROCEDURE — 36415 COLL VENOUS BLD VENIPUNCTURE: CPT

## 2022-11-08 PROCEDURE — 83605 ASSAY OF LACTIC ACID: CPT

## 2022-11-08 PROCEDURE — 72125 CT NECK SPINE W/O DYE: CPT | Mod: MA

## 2022-11-08 PROCEDURE — 83615 LACTATE (LD) (LDH) ENZYME: CPT

## 2022-11-08 PROCEDURE — 85045 AUTOMATED RETICULOCYTE COUNT: CPT

## 2022-11-08 PROCEDURE — 85025 COMPLETE CBC W/AUTO DIFF WBC: CPT

## 2022-11-08 PROCEDURE — 83036 HEMOGLOBIN GLYCOSYLATED A1C: CPT

## 2022-11-08 PROCEDURE — 97530 THERAPEUTIC ACTIVITIES: CPT

## 2022-11-08 PROCEDURE — 84484 ASSAY OF TROPONIN QUANT: CPT

## 2022-11-08 PROCEDURE — 0225U NFCT DS DNA&RNA 21 SARSCOV2: CPT

## 2022-11-08 PROCEDURE — 96374 THER/PROPH/DIAG INJ IV PUSH: CPT

## 2022-11-08 PROCEDURE — 97162 PT EVAL MOD COMPLEX 30 MIN: CPT

## 2022-11-08 PROCEDURE — 80053 COMPREHEN METABOLIC PANEL: CPT

## 2022-11-08 PROCEDURE — 97110 THERAPEUTIC EXERCISES: CPT

## 2022-11-08 PROCEDURE — 85018 HEMOGLOBIN: CPT

## 2022-11-08 PROCEDURE — 87086 URINE CULTURE/COLONY COUNT: CPT

## 2022-11-08 PROCEDURE — 82803 BLOOD GASES ANY COMBINATION: CPT

## 2022-11-08 PROCEDURE — 87040 BLOOD CULTURE FOR BACTERIA: CPT

## 2022-11-08 PROCEDURE — 82947 ASSAY GLUCOSE BLOOD QUANT: CPT

## 2022-11-08 PROCEDURE — 71275 CT ANGIOGRAPHY CHEST: CPT | Mod: MA

## 2022-11-08 PROCEDURE — 82550 ASSAY OF CK (CPK): CPT

## 2022-11-08 RX ORDER — APIXABAN 2.5 MG/1
1 TABLET, FILM COATED ORAL
Qty: 0 | Refills: 0 | DISCHARGE
Start: 2022-11-08

## 2022-11-08 RX ADMIN — CHLORHEXIDINE GLUCONATE 1 APPLICATION(S): 213 SOLUTION TOPICAL at 10:22

## 2022-11-08 RX ADMIN — Medication 400 MILLIGRAM(S): at 05:52

## 2022-11-08 RX ADMIN — Medication 100 MILLIGRAM(S): at 05:52

## 2022-11-08 RX ADMIN — APIXABAN 5 MILLIGRAM(S): 2.5 TABLET, FILM COATED ORAL at 05:51

## 2022-11-08 NOTE — PROGRESS NOTE ADULT - PROBLEM SELECTOR PLAN 1
- Possibly prerenal STEPHEN i/s/o sepsis and diarrhea vs ATN vs contrast nephropathy. Baseline SCr 1.5. FeNa suggesting pre-renal but unchanged Cr despite maint fluids. Bladder scans <400.   - Likely in setting of poor PO.  - Cr improving and stable, encouraged PO

## 2022-11-08 NOTE — PROGRESS NOTE ADULT - SUBJECTIVE AND OBJECTIVE BOX
***************************************************************  Jaidenasencio (Ji-[facility]heng) Foreign PGY1  Internal Medicine   ***************************************************************    NIK GRIMM  72y  MRN: [id number]    Patient is a 72y old  Male who presents with a chief complaint of hypoxia (07 Nov 2022 07:04)      Subjective: no events ON. Denies fever, CP, SOB, abn pain, N/V, dysuria. Tolerating diet.      MEDICATIONS  (STANDING):  acyclovir   Oral Tab/Cap 400 milliGRAM(s) Oral two times a day  allopurinol 50 milliGRAM(s) Oral daily  apixaban 5 milliGRAM(s) Oral every 12 hours  atorvastatin 40 milliGRAM(s) Oral at bedtime  chlorhexidine 2% Cloths 1 Application(s) Topical daily  ferrous    sulfate 325 milliGRAM(s) Oral <User Schedule>  metoprolol succinate  milliGRAM(s) Oral daily  multivitamin 1 Tablet(s) Oral daily    MEDICATIONS  (PRN):  senna 2 Tablet(s) Oral at bedtime PRN Constipation      Objective:    Vitals: Vital Signs Last 24 Hrs  T(C): 36.6 (11-08-22 @ 05:19), Max: 36.6 (11-08-22 @ 05:19)  T(F): 97.8 (11-08-22 @ 05:19), Max: 97.8 (11-08-22 @ 05:19)  HR: 82 (11-08-22 @ 05:19) (67 - 82)  BP: 159/71 (11-08-22 @ 05:19) (129/61 - 159/71)  BP(mean): --  RR: 18 (11-08-22 @ 05:19) (18 - 18)  SpO2: 95% (11-08-22 @ 05:19) (95% - 98%)            I&O's Summary    07 Nov 2022 07:01  -  08 Nov 2022 07:00  --------------------------------------------------------  IN: 120 mL / OUT: 1000 mL / NET: -880 mL        PHYSICAL EXAM:  GENERAL: NAD  HEAD:  Atraumatic, Normocephalic  EYES: EOMI, conjunctiva and sclera clear  CHEST/LUNG: Clear to auscultation bilaterally; No rales, rhonchi, wheezing, or rubs  HEART: Regular rate and rhythm; No murmurs, rubs, or gallops  ABDOMEN: Soft, Nontender, Nondistended;   SKIN: No rashes or lesions  NERVOUS SYSTEM:  Alert & Oriented X3, no focal deficits    LABS:  11-07    138  |  107  |  31<H>  ----------------------------<  76  4.7   |  16<L>  |  1.37<H>  11-06    142  |  107  |  29<H>  ----------------------------<  85  3.3<L>   |  23  |  1.35<H>    Ca    8.4      07 Nov 2022 07:12  Ca    8.4      06 Nov 2022 07:10  Phos  3.4     11-07  Mg     1.5     11-07    TPro  6.3  /  Alb  2.5<L>  /  TBili  0.6  /  DBili  x   /  AST  45<H>  /  ALT  29  /  AlkPhos  205<H>  11-07  TPro  5.9<L>  /  Alb  2.8<L>  /  TBili  0.6  /  DBili  x   /  AST  34  /  ALT  33  /  AlkPhos  163<H>  11-06                                              8.5    7.97  )-----------( 358      ( 08 Nov 2022 06:32 )             27.6                         8.4    9.94  )-----------( 355      ( 07 Nov 2022 10:40 )             27.5                         8.2    8.84  )-----------( 305      ( 06 Nov 2022 07:11 )             26.5     CAPILLARY BLOOD GLUCOSE          RADIOLOGY & ADDITIONAL TESTS:    Imaging Personally Reviewed:  [x ] YES  [ ] NO    Consultants involved in case:   Consultant(s) Notes Reviewed:  [ x] YES  [ ] NO:   Care Discussed with Consultants/Other Providers [x ] YES  [ ] NO         ***************************************************************  Jaiden  [facility] PGY1  Internal Medicine   ***************************************************************    NIK GRIMM  72y  MRN: [id number]    72 year old male with afib (on eliquis), HTN, complete heart block s/p PPM, HLD, HFrEF (30% in 09/2022), and DLBCL (tx with R-CHOP in 2003, with relapse in 2009 treated with BR) now with recently diagnosed grade 3 follicular lymphoma BIBEMS with fever and AMS after being found down by his daughter this AM. Presentation concerning for toxic metabolic encephalopathy in setting of sepsis possible 2/2 pneumonia vs other infectious etiology (GI), being treated w/ abx, course c/b STEPHEN now improved awaiting DC to [facility].     Subjective: NAEO. Comfortable today, no SOB.    MEDICATIONS  (STANDING):  acyclovir   Oral Tab/Cap 400 milliGRAM(s) Oral two times a day  allopurinol 50 milliGRAM(s) Oral daily  apixaban 5 milliGRAM(s) Oral every 12 hours  atorvastatin 40 milliGRAM(s) Oral at bedtime  chlorhexidine 2% Cloths 1 Application(s) Topical daily  ferrous    sulfate 325 milliGRAM(s) Oral <User Schedule>  metoprolol succinate  milliGRAM(s) Oral daily  multivitamin 1 Tablet(s) Oral daily    MEDICATIONS  (PRN):  senna 2 Tablet(s) Oral at bedtime PRN Constipation      Objective:    Vitals: Vital Signs Last 24 Hrs  T(C): 36.6 (11-08-22 @ 05:19), Max: 36.6 (11-08-22 @ 05:19)  T(F): 97.8 (11-08-22 @ 05:19), Max: 97.8 (11-08-22 @ 05:19)  HR: 82 (11-08-22 @ 05:19) (67 - 82)  BP: 159/71 (11-08-22 @ 05:19) (129/61 - 159/71)  BP(mean): --  RR: 18 (11-08-22 @ 05:19) (18 - 18)  SpO2: 95% (11-08-22 @ 05:19) (95% - 98%)            I&O's Summary    07 Nov 2022 07:01  -  08 Nov 2022 07:00  --------------------------------------------------------  IN: 120 mL / OUT: 1000 mL / NET: -880 mL        PHYSICAL EXAM:  GENERAL: NAD  HEAD:  Atraumatic, Normocephalic  EYES: EOMI, conjunctiva and sclera clear  CHEST/LUNG: Clear to auscultation bilaterally; No rales, rhonchi, wheezing, or rubs  HEART: Regular rate and rhythm; No murmurs, rubs, or gallops  ABDOMEN: Soft, Nontender, Nondistended;   SKIN: No rashes or lesions  NERVOUS SYSTEM:  Alert & Oriented X3, no focal deficits    LABS:  11-07    138  |  107  |  31<H>  ----------------------------<  76  4.7   |  16<L>  |  1.37<H>  11-06    142  |  107  |  29<H>  ----------------------------<  85  3.3<L>   |  23  |  1.35<H>    Ca    8.4      07 Nov 2022 07:12  Ca    8.4      06 Nov 2022 07:10  Phos  3.4     11-07  Mg     1.5     11-07    TPro  6.3  /  Alb  2.5<L>  /  TBili  0.6  /  DBili  x   /  AST  45<H>  /  ALT  29  /  AlkPhos  205<H>  11-07  TPro  5.9<L>  /  Alb  2.8<L>  /  TBili  0.6  /  DBili  x   /  AST  34  /  ALT  33  /  AlkPhos  163<H>  11-06                                              8.5    7.97  )-----------( 358      ( 08 Nov 2022 06:32 )             27.6                         8.4    9.94  )-----------( 355      ( 07 Nov 2022 10:40 )             27.5                         8.2    8.84  )-----------( 305      ( 06 Nov 2022 07:11 )             26.5     CAPILLARY BLOOD GLUCOSE          RADIOLOGY & ADDITIONAL TESTS:    Imaging Personally Reviewed:  [x ] YES  [ ] NO    Consultants involved in case:   Consultant(s) Notes Reviewed:  [ x] YES  [ ] NO:   Care Discussed with Consultants/Other Providers [x ] YES  [ ] NO

## 2022-11-08 NOTE — PROGRESS NOTE ADULT - ATTENDING COMMENTS
72 year old male with afib (eliquis), HTN,  complete heart block s/p PPM, HLD, HFrEF (30% in 09/2022), and DLBCL (tx with R-CHOP in 2003, with relapse in 2009 treated with BR) now with recently diagnosed grade 3 follicular lymphoma BIBEMS with fever and AMS after being found down by his daughter admitted for acute metabolic encephalopathy with sepsis 2/2 pneumonia s/p 7 days zosyn. Pulm consulted for loculated effusion on CT but POCUS showed no tapable pocket. PPM interrogated. TTE- EF 30% with mod pulm htn. Cr improving. Lethargy improved with mental status near baseline.     #STEPHEN  #Fe deficiency Anemia  #Fall  #Non Hodgkins Lymphoma    -s/p 7 days of zosyn  -per heme onc, patient can continue with next dose of chemo from [facility] through peripheral IV. Daughter refused PICC line placement and no plans for inpatient mediport by IR  -PT -[facility]    d/w HS3  35 min spent Dispo planning to subacute rehab. No plans for PICC line or mediport.     Lindy Gonzalez MD  Division of Hospital Medicine  Available on Microsoft Teams .

## 2022-11-08 NOTE — CHART NOTE - NSCHARTNOTEFT_GEN_A_CORE
Discussed with outpatient hematologist. At this time, will proceed with delaying next cycle of chemotherapy. However, while at rehab, patient will need to be able to go to [facility] for office visit (not for chemo administration) to discuss potential changes in his management.     ****************************************  Pancho Wilkinson PGY5  Hematology/Oncology   [phone]  ****************************************

## 2022-11-08 NOTE — PROGRESS NOTE ADULT - ASSESSMENT
72 year old male with afib (on eliquis), HTN, complete heart block s/p PPM, HLD, HFrEF (30% in 09/2022), and DLBCL (tx with R-CHOP in 2003, with relapse in 2009 treated with BR) now with recently diagnosed grade 3 follicular lymphoma BIBEMS with fever and AMS after being found down by his daughter this AM. Presentation concerning for toxic metabolic encephalopathy in setting of sepsis possible 2/2 pneumonia vs other infectious etiology (GI), being treated w/ abx, course c/b STEPHEN now improved awaiting DC to [facility].

## 2022-11-08 NOTE — PROGRESS NOTE ADULT - PROVIDER SPECIALTY LIST ADULT
Internal Medicine
Internal Medicine
Pulmonology
Internal Medicine

## 2022-11-08 NOTE — PROGRESS NOTE ADULT - PROBLEM SELECTOR PLAN 3
- Admitted with sepsis. Pt on chemotherapy for non-Hodgkins Lymphoma making him immunocompromised.  - Sepsis cause unclear, could be PNA, r/o other etiology (GI)   - Pulm consulted, appreciate recommendations               - Defer thoracentesis for now due to similar findings on prior CT, suspect less contribution to fever.                - c/w zosyn for empiric coverage (10/29-10/4) for 7 days total, completed and afebrile, stable.   -c/w home acyclovir ppx - Admitted with sepsis. Pt on chemotherapy for non-Hodgkins Lymphoma making him immunocompromised.  - Sepsis cause unclear, could be PNA, r/o other etiology (GI)   - Pulm consulted, appreciate recommendations               - Defer thoracentesis for now due to similar findings on prior CT, suspect less contribution to fever.                - zosyn for empiric coverage (10/29-11/4) for 7 days total, completed and afebrile, stable.   -c/w home acyclovir ppx

## 2022-11-08 NOTE — PROGRESS NOTE ADULT - ATTENDING SUPERVISION STATEMENT
Fellow
Resident

## 2022-11-08 NOTE — PROGRESS NOTE ADULT - PROBLEM SELECTOR PLAN 11
DVT PPx: Eliquis  Diet: DASH/TLC diet  Bowel Regimen:   Code: Full  Dispo: [facility]  Pharmacy:  PCP:   Communication: Spoke with daughter Vonnie 11/6 DVT PPx: Eliquis  Diet: DASH/TLC diet  Bowel Regimen:   Code: Full  Dispo: [facility]  Pharmacy:  PCP:   Communication: Spoke with daughter Vonnie 11/7

## 2022-11-08 NOTE — PROGRESS NOTE ADULT - NUTRITIONAL ASSESSMENT
This patient has been assessed with a concern for Malnutrition and has been determined to have a diagnosis/diagnoses of Severe protein-calorie malnutrition.    This patient is being managed with:   Diet DASH/TLC-  Sodium & Cholesterol Restricted  Entered: Nov 4 2022 12:20PM    
This patient has been assessed with a concern for Malnutrition and has been determined to have a diagnosis/diagnoses of Severe protein-calorie malnutrition.    This patient is being managed with:   Diet Soft and Bite Sized-  Mildly Thick Liquids (MILDTHICKLIQS)  Supplement Feeding Modality:  Oral  Glucerna Shake Cans or Servings Per Day:  3       Frequency:  Daily  Entered: Nov 2 2022 12:11PM    
This patient has been assessed with a concern for Malnutrition and has been determined to have a diagnosis/diagnoses of Severe protein-calorie malnutrition.    This patient is being managed with:   Diet DASH/TLC-  Sodium & Cholesterol Restricted  Supplement Feeding Modality:  Oral  Ensure Enlive Cans or Servings Per Day:  3       Frequency:  Daily  Entered: Nov 7 2022  3:44PM    
This patient has been assessed with a concern for Malnutrition and has been determined to have a diagnosis/diagnoses of Severe protein-calorie malnutrition.    This patient is being managed with:   Diet Soft and Bite Sized-  Mildly Thick Liquids (MILDTHICKLIQS)  Supplement Feeding Modality:  Oral  Glucerna Shake Cans or Servings Per Day:  3       Frequency:  Daily  Entered: Nov 2 2022 12:11PM

## 2022-11-08 NOTE — PROGRESS NOTE ADULT - REASON FOR ADMISSION
hypoxia

## 2022-11-10 ENCOUNTER — NON-APPOINTMENT (OUTPATIENT)
Age: 72
End: 2022-11-10

## 2022-11-11 ENCOUNTER — APPOINTMENT (OUTPATIENT)
Dept: INFUSION THERAPY | Facility: HOSPITAL | Age: 72
End: 2022-11-11

## 2022-11-14 ENCOUNTER — APPOINTMENT (OUTPATIENT)
Dept: INFUSION THERAPY | Facility: HOSPITAL | Age: 72
End: 2022-11-14

## 2022-11-16 LAB
CULTURE RESULTS: SIGNIFICANT CHANGE UP
SPECIMEN SOURCE: SIGNIFICANT CHANGE UP

## 2022-11-17 ENCOUNTER — RESULT REVIEW (OUTPATIENT)
Age: 72
End: 2022-11-17

## 2022-11-17 ENCOUNTER — APPOINTMENT (OUTPATIENT)
Dept: HEMATOLOGY ONCOLOGY | Facility: CLINIC | Age: 72
End: 2022-11-17

## 2022-11-17 VITALS
RESPIRATION RATE: 18 BRPM | TEMPERATURE: 98.1 F | HEART RATE: 91 BPM | OXYGEN SATURATION: 95 % | WEIGHT: 145.95 LBS | SYSTOLIC BLOOD PRESSURE: 153 MMHG | DIASTOLIC BLOOD PRESSURE: 75 MMHG | BODY MASS INDEX: 25.86 KG/M2

## 2022-11-17 LAB
BASOPHILS # BLD AUTO: 0.07 K/UL — SIGNIFICANT CHANGE UP (ref 0–0.2)
BASOPHILS NFR BLD AUTO: 1 % — SIGNIFICANT CHANGE UP (ref 0–2)
ELLIPTOCYTES BLD QL SMEAR: SLIGHT — SIGNIFICANT CHANGE UP
EOSINOPHIL # BLD AUTO: 0.22 K/UL — SIGNIFICANT CHANGE UP (ref 0–0.5)
EOSINOPHIL NFR BLD AUTO: 3 % — SIGNIFICANT CHANGE UP (ref 0–6)
HCT VFR BLD CALC: 27.6 % — LOW (ref 39–50)
HGB BLD-MCNC: 8.8 G/DL — LOW (ref 13–17)
HYPOCHROMIA BLD QL: SLIGHT — SIGNIFICANT CHANGE UP
LYMPHOCYTES # BLD AUTO: 0.45 K/UL — LOW (ref 1–3.3)
LYMPHOCYTES # BLD AUTO: 6 % — LOW (ref 13–44)
MCHC RBC-ENTMCNC: 27.8 PG — SIGNIFICANT CHANGE UP (ref 27–34)
MCHC RBC-ENTMCNC: 31.9 G/DL — LOW (ref 32–36)
MCV RBC AUTO: 87.3 FL — SIGNIFICANT CHANGE UP (ref 80–100)
MONOCYTES # BLD AUTO: 0.52 K/UL — SIGNIFICANT CHANGE UP (ref 0–0.9)
MONOCYTES NFR BLD AUTO: 7 % — SIGNIFICANT CHANGE UP (ref 2–14)
NEUTROPHILS # BLD AUTO: 6.18 K/UL — SIGNIFICANT CHANGE UP (ref 1.8–7.4)
NEUTROPHILS NFR BLD AUTO: 83 % — HIGH (ref 43–77)
NRBC # BLD: 0 /100 — SIGNIFICANT CHANGE UP (ref 0–0)
NRBC # BLD: SIGNIFICANT CHANGE UP /100 WBCS (ref 0–0)
PLAT MORPH BLD: NORMAL — SIGNIFICANT CHANGE UP
PLATELET # BLD AUTO: 176 K/UL — SIGNIFICANT CHANGE UP (ref 150–400)
POIKILOCYTOSIS BLD QL AUTO: SLIGHT — SIGNIFICANT CHANGE UP
RBC # BLD: 3.16 M/UL — LOW (ref 4.2–5.8)
RBC # FLD: 18.7 % — HIGH (ref 10.3–14.5)
RBC BLD AUTO: ABNORMAL
WBC # BLD: 7.45 K/UL — SIGNIFICANT CHANGE UP (ref 3.8–10.5)
WBC # FLD AUTO: 7.45 K/UL — SIGNIFICANT CHANGE UP (ref 3.8–10.5)

## 2022-11-17 PROCEDURE — 99215 OFFICE O/P EST HI 40 MIN: CPT

## 2022-11-19 NOTE — PHYSICAL EXAM
[Normal] : affect appropriate [Ambulatory and capable of all self care but unable to carry out any work activities] : Status 2- Ambulatory and capable of all self care but unable to carry out any work activities. Up and about more than 50% of waking hours [de-identified] : Using a wheelchair, but ambulates when needed, limited by strength [de-identified] : +PPM

## 2022-11-19 NOTE — HISTORY OF PRESENT ILLNESS
[Disease:__________________________] : Disease: [unfilled] [Treatment Protocol] : Treatment Protocol [de-identified] : 71 yo with MHx significant for Atrial flutter (on Apixaban), HTN, here for further evaluation of low-level neutrophilia (since 1/2022) and lymphadenopathy. Previously NHL (around [id number]?). He received chemotherapy in 2004. 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 In 2010 he went to [facility] and was treated for reoccurrence and was treated with bendamustine. He reports that he has had areas of swelling in his neck on and off for about 2 years which was mostly undergoing workup with his endocrinologist. In the last few months he has noticed increasing size of his left cervical LNs and a right nodule that caused swelling of his face, which has now spontaneously decreased in size significantly.\par \par Today he reports he feels well outside of weight loss. He denies any other constitutional complaints. He weighed 192 lbs in 10/2021 which was down to 183 lbs in December 12/2021(2 months later) which has further decreased down to 179 lbs today. He has not felt himself masses outside of his neck region. On 5/14/22, he went for US duplex of his left lower extremity due to a "tangerine size lump" on the back of his left thigh, which noted a 4.4 cm hypoechoic mass in his left inguinal region without commenting on his perceived posterior thigh mass. Also, on 5/2/22 he underwent US of his thyroid and parathyroid glands where they noticed bilateral cervical lymph nodes with the largest on the left side measuring 2.1 x 1.6 x 1.9 cm and a right level 1 LN measuring 2.2 x 1.2 x 2.3 cm. They saw 3 lymph nodes on the left side and one discrete LN on the right.\par \par  He also recently just finished a 14 day course of Levofloxacin for an uncomplicated phenomena. He was having a dry cough and underwent imaging as an outpatient which found mild left and right pleural effusions, areas of consolidation on the right and retrocardiac airspace involvement (performed 5/2/22). He now feels better without infectious complaints.\par \par In addition, he is currently anemic. He last had a colonoscopy in his recent memory. He had bleeding hemorrhoids last year treated with OTC medications. He denies any tick bites recently. \par \par He also has MGUS with 3 separate monoclonal gammopathies I suspect is related to his NHL. He has M Judd 1 showing IgG Lambda at 0.3 g/dl, M Judd 2 showing IgG Kappa at 0.2 g/dl, M Judd 3 showing IgM Lambda (unable to quantitate). There is no suspicion for myeloma or waldenstroms at this time and his M-spikes will be monitored. He has no elevation in kappa/ lambda FLC ration, but individual light chains are both elevated, kappa at 10.77 mg/dL and lambda at 6.58 mg/dL.\par \par Social Hx\par - He is currently retired. He used to be an .\par - No significant environmental exposure to chemicals\par - Lives by himself and his wife lives in [place].\par - Former smoker. He smoked for 10 years less than 1 pack a day. He quit 20 years ago\par \par Family hx\par - Two brothers non Hodgkins lymphoma. At least one required chemotherapy. sister had cervical cancer first diagnosed 2007 and then 3 years later with more cancer discovered\par - Mother and father passed away from heart disease and diabetes.\par \par =======================================================\par Care Providers:\[facility] \par PMD: Dr Amari Hickman\[facility] Endo: Dr Carter Vigil\[facility] Cardiologist: Dr. Don [phone] fax [phone]\par \par =======================================================\par \par Vonnie (daughter): [phone] - takes all healthcare related calls [de-identified] : PENDING [de-identified] : Fine Needle Aspiration Report - Auth (Verified)\par Specimen(s) Submitted\par 1. AXILLA, LEFT, US GUIDED CORE BIOPSY AND FNA\par 2. LYMPH NODE, CERVICAL, LEFT POSTERIOR, US GUIDED CORE BIOPSY AND FNA\par \par \par Final Diagnosis\par 1. AXILLA, LEFT, US GUIDED CORE BIOPSY AND FNA\par POSITIVE FOR MALIGNANT CELLS.\par B-cell lymphoma of follicular center origin, most consistent with\par follicular lymphoma, grade 3A.  See note.\par \par Fine Needle Aspiration Addendum Report - Auth (Verified)\par \par Addendum\par 2. LYMPH NODE, CERVICAL, LEFT POSTERIOR, US GUIDED CORE BIOPSY AND FNA\par POSITIVE FOR MALIGNANT CELLS.\par B-cell lymphoma of follicular center origin with high proliferation index.\par See note.\par \par Note:\par The neoplastic B cells demonstrate a follicular center B-cell phenotype with MUM-1 co-expression and a high proliferation index.  Follicular dendritic meshwork is present in most areas of the biopsy, consistent\par with follicular lymphoma.  However, there is one focal area with increased larger cells and lack of follicular dendritic meshwork. Therefore, a high grade component can not be excluded.  If clinically indicated, suggest excisional biopsy for definitive classification.\par \par Immunohistochemical stains   (CD3, CD5, CD10, CD20, CD21, CD23, CD30, BCL-6, BCL-2, cyclinD1, ki-67, c-MYC, PAX-5, MUM-1, p53, ISAURO) were performed on block 2C.  The neoplastic cells are positive for CD20, PAX-5, BCL-6, BCL-2, MUM-1 (partial), dim p53; negative CD5, CD10, CD30, cyclinD1, ISAURO.  CD21 and CD23 highlight follicular dendritic meshwork in most areas with focal absence of follicular dendritic meshwork. \par Ki-67 proliferation index is approximately 60 to 70%.  C-MYC stains approximately 20 to 30% of cells.  CD3 and CD5 highlight some T-cells in the background. [de-identified] : 6/14/22 FNA of Left axilla LN: FLUORESCENCE IN-SITU HYBRIDIZATION (FISH)\par 1. No evidence of MYC Rearrangement\par 2. No evidence of BCL2-IGH [translocation t(14;18)] gene rearrangement\par 3. 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 [de-identified] : 5/18/22: Initial Visit.\par \par 6/20/22: Follow-up. Patient feels well overall. Lymphoma labs and left axilla LN biopsy revealed grade 3A follicular lymphoma, IgG Lambda and Kappa MGUS. He reports lymph nodes have been stable. Heart function is stable. He denies having any recent fevers, chills, nausea. No weight changes.\par \par 8/22/22: Follow-up. Patient feels well overall. Awaiting for biopsy results of lymph nodes in his back. He does not have pain in the left back. The left side of his neck gives him discomfort. He has never received chemotherapy nor antibody treatment in the past. No fevers, chills, night sweats. No new noticeable masses  Good appetite, lost 7 lbs (lost a lot of fluid weight due to diuretics). \par \par 10/17/22: Follow up. In the interim, he was hospitalized at [facility] twice (8/27-9/12 & 9/16-10/3). 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 Patient was treated with rasburicase, but developed rasburicase-triggered G6PD hemolysis. Also found to be in acute exacerbation of HFrEF and have a prolonged QTc (likely medication-induced). During hospital stay was found to have altered mental status. 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 In mid-September patient was hospitalized for Encephalopathy (+Rhinovirus/enterovirus) unrelated to the Lymphoma. Today, he feels at baseline. Notes resolution of cervical LNs, and back lesions since starting treatment. Patient denies fever, chills, night sweats, back pain, abdominal pain, chest pain, or shortness of breath. Fair appetite, weight loss due to prolonged hospitalizations.\par \par 11/17/22: Follow-up. On 10/28/22 at home was found down by his daughter found to have fever, STEPHEN and AMS and was admitted for acute metabolic encephalopathy with sepsis 2/2 pneumonia s/p 7 days zosyn with improvement. Today he presents from rehab with his daughter. He is not feeling well. He feels that he is weak and fatigued. He has been doing PT / walking around the facility. He reads when awake. He has a poor appetite, but his family is bringing in food that he likes. No fevers, chills, night sweats. No new lumps or masses.\par \par \par ___\par A comprehensive review of systems was performed including constitutional, eyes, ENT, cardiovascular, respiratory, gastrointestinal, genitourinary, musculoskeletal, integumentary, neurological, psychiatric and hematologic / lymphatic. All pertinent positives are included in the H&P under interval history above and the remaining review of systems listed are negative. \par \par ====================================================\par Treatment Hx:\par \par Obinutuzumab-mini-COEP q21 days x 3 cycles (incomplete due to toxicities and patient preference to switch to more tolerable regimen)\par (Obinutuzumab modified mini-COEP: 400 mg/m² cyclophosphamide, Etoposide (40% dose reduced to 30 mg/m2 IV on day 1 and 60 mg/m2 PO on days 2 and 3 of each cycle), and 2 mg vincristine (flat dose) on day 1 of each cycle, and 100 mg prednisone on days 1–5. Modified from Man et al 2009 Blood "R-CHOP with Etoposide Substituted for Doxorubicin (R-CEOP): Excellent Outcome in Diffuse Large B Cell Lymphoma for Patients with a Contraindication to Anthracyclines." with mini- dosing for elderly patients)\par - Cycle 1 Day 1 given 9/2/22 - third dose of Obinutuzumab held due to AMS, requiring admission to [facility] found to have enterovirus infection\par - Cycle 2 Day 1 given 9/30/22 - Given as an inpatient at [facility]\par - Cycle 3 Day 1 given 10/21/22 - Complicated by pneumonia, requiring admission and rehab placement\par \par Obinutuzumab plus Revlimid (changed therapy due to patient and family's wishes due to intolerable toxicities)\par - Cycle 1 Day 1 Pending d/c from rehab and receipt of Revlimid then will schedule\par \par \par ==================================================== [FreeTextEntry1] : Obinutuzumab plus Revlimid 20 mg daily 21/28 days

## 2022-11-19 NOTE — ASSESSMENT
[FreeTextEntry1] : 73 yo male with PMHx significant for follicular lymphoma with DLBCL (tx with R-CHOP in 2003, with relapse in 2009, treated with BR) with his initial presentation at [facility] with multiple areas of palpable lymphadenopathy with follicular lymphoma grade IIIA (IPI score 3) in the setting of a steady decline in weight > 20 lbs in the last 6 months without other constitutional complaints. 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 \par \par He underwent PET-CT in Aug 2022 which showed FDG avid lymph nodes in the left cervical, bilateral supraclavicular regions, and chest, and a few FDG avid abdominal lymph nodes, intramuscular masses in the left posterior chest and left upper thigh, scattered FDG avid subcutaneous soft tissue/nodules with trace right pleural effusion, moderate left pleural effusion, loculated fluid in the right middle lobe. Moderate abdominal and pelvic ascites. Subcutaneous edema in the lower abdominal wall extending into the imaged bilateral thighs. Splenomegaly with mild FDG avidity, suspicious for lymphomatous involvement.\par \par Prior to the PET-CT, he underwent biopsy of both a cervical lymph node and a left axilla lymph node. 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 There were areas of > 20 SUV on the PET-CT, repeat whole lymph node biopsy was requested to confirm suspected diagnosis of Grade 3B FL or transformed large cell lymphoma. S/p excisional biopsy of back lesion on 9/7/22 which showed recurrent DLBCL.\par \par LP 9/26/22: protein elevated at 61, LDH 27, neutrophils 0, lymphocytes 33, monocytes 67, RBC 5. Oligoclonal bands negative. The flow cytometry failed due to insufficient cells. Would consider positive and recommend continued IT MTX therapy going forward x 4 total cycles.\par \par Plan:\par - Repeat Lymphoma labs today, PT/PTT\par - Plan for interim PET scan after cycle 3 in the next 1-2 weeks; will switch to Obinutuzumab 1000 mg every 28 days + Revlimid 20 mg daily 21/28 days for completion of total 3/6 cycles and possible maintenance\par - Focus on medical issues and getting stronger, currently at [facility], reports he has not been doing many exercises and needs more motivation per daughter\par - Patient needs more aggressive PT, clinically he is stable and appears to be in a remission\par - COVID vaccine (Pfizer: 3/2/21 & 3/23/21) and booster 9/21/21. ? bivalent booster status.\par - Follow up  in 2 weeks\par \par ___\par I personally have spent a total of 40 minutes of time on the date of this encounter reviewing test results, documenting findings, providing education, coordinating care and directly consulting with the patient and/or designated family member. \par

## 2022-11-19 NOTE — CONSULT LETTER
[Dear  ___] : Dear ~ANTONIETTA, [Courtesy Letter:] : I had the pleasure of seeing your patient, [unfilled], in my office today. [Please see my note below.] : Please see my note below. [Consult Closing:] : Thank you very much for allowing me to participate in the care of this patient.  If you have any questions, please do not hesitate to contact me. [Sincerely,] : Sincerely, [FreeTextEntry3] : Jason Cordova DO, MSc\par , [facility]\par [facility]\par [facility]\par [address] [address]\par Tel: [phone]\par Fax: [phone]\par

## 2022-11-29 NOTE — OCCUPATIONAL THERAPY INITIAL EVALUATION ADULT - PHYSICAL ASSIST/NONPHYSICAL ASSIST: STAND/SIT, REHAB EVAL
Patient with history of GI bleed and previous hospitalization     -since admission hemoglobin levels have been trending down from 11 6 on presentation to 8 7    -2 consecutive readings from past 24 hours with no change  Most likely no active bleeding at this time  Will continue to re-evaluate  -FOBT 1/3:  positive  Pending complete set  -hemoglobin trend stable  GI team consulted and did not want to further proceed with any interventions at this time for the next 24 hours  Plan:   -GI team consulted; input highly appreciated  -follow-up with morning labs  -continue PPI therapy for stress ulcer prophylaxis  -consider holding anticoagulants if continues to trend down  supervision

## 2022-12-02 ENCOUNTER — APPOINTMENT (OUTPATIENT)
Dept: INFUSION THERAPY | Facility: HOSPITAL | Age: 72
End: 2022-12-02

## 2022-12-05 ENCOUNTER — APPOINTMENT (OUTPATIENT)
Dept: INFUSION THERAPY | Facility: HOSPITAL | Age: 72
End: 2022-12-05

## 2022-12-06 ENCOUNTER — APPOINTMENT (OUTPATIENT)
Dept: NUCLEAR MEDICINE | Facility: IMAGING CENTER | Age: 72
End: 2022-12-06

## 2022-12-06 ENCOUNTER — INPATIENT (INPATIENT)
Facility: HOSPITAL | Age: 72
LOS: 8 days | Discharge: NURSING, REHAB OR LONG-TERM CARE | DRG: 871 | End: 2022-12-15
Attending: STUDENT IN AN ORGANIZED HEALTH CARE EDUCATION/TRAINING PROGRAM | Admitting: STUDENT IN AN ORGANIZED HEALTH CARE EDUCATION/TRAINING PROGRAM
Payer: COMMERCIAL

## 2022-12-06 VITALS
HEIGHT: 72.25 IN | RESPIRATION RATE: 28 BRPM | WEIGHT: 125 LBS | DIASTOLIC BLOOD PRESSURE: 70 MMHG | HEART RATE: 123 BPM | SYSTOLIC BLOOD PRESSURE: 120 MMHG | TEMPERATURE: 98 F | OXYGEN SATURATION: 100 %

## 2022-12-06 DIAGNOSIS — C85.90 NON-HODGKIN LYMPHOMA, UNSPECIFIED, UNSPECIFIED SITE: Chronic | ICD-10-CM

## 2022-12-06 DIAGNOSIS — A41.9 SEPSIS, UNSPECIFIED ORGANISM: ICD-10-CM

## 2022-12-06 DIAGNOSIS — I50.22 CHRONIC SYSTOLIC (CONGESTIVE) HEART FAILURE: ICD-10-CM

## 2022-12-06 DIAGNOSIS — Z29.9 ENCOUNTER FOR PROPHYLACTIC MEASURES, UNSPECIFIED: ICD-10-CM

## 2022-12-06 DIAGNOSIS — J90 PLEURAL EFFUSION, NOT ELSEWHERE CLASSIFIED: ICD-10-CM

## 2022-12-06 DIAGNOSIS — R09.89 OTHER SPECIFIED SYMPTOMS AND SIGNS INVOLVING THE CIRCULATORY AND RESPIRATORY SYSTEMS: ICD-10-CM

## 2022-12-06 DIAGNOSIS — I48.0 PAROXYSMAL ATRIAL FIBRILLATION: ICD-10-CM

## 2022-12-06 DIAGNOSIS — U07.1 COVID-19: ICD-10-CM

## 2022-12-06 DIAGNOSIS — J18.9 PNEUMONIA, UNSPECIFIED ORGANISM: ICD-10-CM

## 2022-12-06 DIAGNOSIS — Z95.0 PRESENCE OF CARDIAC PACEMAKER: Chronic | ICD-10-CM

## 2022-12-06 DIAGNOSIS — J15.6 PNEUMONIA DUE TO OTHER GRAM-NEGATIVE BACTERIA: ICD-10-CM

## 2022-12-06 DIAGNOSIS — C85.90 NON-HODGKIN LYMPHOMA, UNSPECIFIED, UNSPECIFIED SITE: ICD-10-CM

## 2022-12-06 LAB
ALBUMIN SERPL ELPH-MCNC: 3.3 G/DL — SIGNIFICANT CHANGE UP (ref 3.3–5)
ALP SERPL-CCNC: 121 U/L — HIGH (ref 40–120)
ALT FLD-CCNC: 19 U/L — SIGNIFICANT CHANGE UP (ref 10–45)
ANION GAP SERPL CALC-SCNC: 13 MMOL/L — SIGNIFICANT CHANGE UP (ref 5–17)
ANISOCYTOSIS BLD QL: SLIGHT — SIGNIFICANT CHANGE UP
APTT BLD: 37.3 SEC — HIGH (ref 27.5–35.5)
AST SERPL-CCNC: 45 U/L — HIGH (ref 10–40)
BASE EXCESS BLDV CALC-SCNC: 1.6 MMOL/L — SIGNIFICANT CHANGE UP (ref -2–3)
BASOPHILS # BLD AUTO: 0 K/UL — SIGNIFICANT CHANGE UP (ref 0–0.2)
BASOPHILS NFR BLD AUTO: 0 % — SIGNIFICANT CHANGE UP (ref 0–2)
BILIRUB SERPL-MCNC: 0.8 MG/DL — SIGNIFICANT CHANGE UP (ref 0.2–1.2)
BUN SERPL-MCNC: 40 MG/DL — HIGH (ref 7–23)
CA-I SERPL-SCNC: 1.2 MMOL/L — SIGNIFICANT CHANGE UP (ref 1.15–1.33)
CALCIUM SERPL-MCNC: 8.9 MG/DL — SIGNIFICANT CHANGE UP (ref 8.4–10.5)
CHLORIDE BLDV-SCNC: 105 MMOL/L — SIGNIFICANT CHANGE UP (ref 96–108)
CHLORIDE SERPL-SCNC: 103 MMOL/L — SIGNIFICANT CHANGE UP (ref 96–108)
CO2 BLDV-SCNC: 28 MMOL/L — HIGH (ref 22–26)
CO2 SERPL-SCNC: 25 MMOL/L — SIGNIFICANT CHANGE UP (ref 22–31)
CREAT SERPL-MCNC: 1.34 MG/DL — HIGH (ref 0.5–1.3)
DACRYOCYTES BLD QL SMEAR: SLIGHT — SIGNIFICANT CHANGE UP
EGFR: 57 ML/MIN/1.73M2 — LOW
ELLIPTOCYTES BLD QL SMEAR: SLIGHT — SIGNIFICANT CHANGE UP
EOSINOPHIL # BLD AUTO: 0 K/UL — SIGNIFICANT CHANGE UP (ref 0–0.5)
EOSINOPHIL NFR BLD AUTO: 0 % — SIGNIFICANT CHANGE UP (ref 0–6)
GAS PNL BLDV: 139 MMOL/L — SIGNIFICANT CHANGE UP (ref 136–145)
GAS PNL BLDV: SIGNIFICANT CHANGE UP
GIANT PLATELETS BLD QL SMEAR: PRESENT — SIGNIFICANT CHANGE UP
GLUCOSE BLDV-MCNC: 122 MG/DL — HIGH (ref 70–99)
GLUCOSE SERPL-MCNC: 131 MG/DL — HIGH (ref 70–99)
HCO3 BLDV-SCNC: 27 MMOL/L — SIGNIFICANT CHANGE UP (ref 22–29)
HCT VFR BLD CALC: 26.2 % — LOW (ref 39–50)
HCT VFR BLDA CALC: 24 % — LOW (ref 39–51)
HGB BLD CALC-MCNC: 8.1 G/DL — LOW (ref 12.6–17.4)
HGB BLD-MCNC: 7.8 G/DL — LOW (ref 13–17)
HYPOCHROMIA BLD QL: SLIGHT — SIGNIFICANT CHANGE UP
INR BLD: 2.17 RATIO — HIGH (ref 0.88–1.16)
LACTATE BLDV-MCNC: 1.1 MMOL/L — SIGNIFICANT CHANGE UP (ref 0.5–2)
LYMPHOCYTES # BLD AUTO: 0.17 K/UL — LOW (ref 1–3.3)
LYMPHOCYTES # BLD AUTO: 9.6 % — LOW (ref 13–44)
MANUAL SMEAR VERIFICATION: SIGNIFICANT CHANGE UP
MCHC RBC-ENTMCNC: 26.5 PG — LOW (ref 27–34)
MCHC RBC-ENTMCNC: 29.8 GM/DL — LOW (ref 32–36)
MCV RBC AUTO: 89.1 FL — SIGNIFICANT CHANGE UP (ref 80–100)
MICROCYTES BLD QL: SLIGHT — SIGNIFICANT CHANGE UP
MONOCYTES # BLD AUTO: 0.19 K/UL — SIGNIFICANT CHANGE UP (ref 0–0.9)
MONOCYTES NFR BLD AUTO: 10.9 % — SIGNIFICANT CHANGE UP (ref 2–14)
NEUTROPHILS # BLD AUTO: 1.38 K/UL — LOW (ref 1.8–7.4)
NEUTROPHILS NFR BLD AUTO: 77.1 % — HIGH (ref 43–77)
NEUTS BAND # BLD: 2.4 % — SIGNIFICANT CHANGE UP (ref 0–8)
PCO2 BLDV: 43 MMHG — SIGNIFICANT CHANGE UP (ref 42–55)
PH BLDV: 7.4 — SIGNIFICANT CHANGE UP (ref 7.32–7.43)
PLAT MORPH BLD: NORMAL — SIGNIFICANT CHANGE UP
PLATELET # BLD AUTO: 178 K/UL — SIGNIFICANT CHANGE UP (ref 150–400)
PO2 BLDV: 33 MMHG — SIGNIFICANT CHANGE UP (ref 25–45)
POIKILOCYTOSIS BLD QL AUTO: SLIGHT — SIGNIFICANT CHANGE UP
POLYCHROMASIA BLD QL SMEAR: SLIGHT — SIGNIFICANT CHANGE UP
POTASSIUM BLDV-SCNC: 3.5 MMOL/L — SIGNIFICANT CHANGE UP (ref 3.5–5.1)
POTASSIUM SERPL-MCNC: 3.8 MMOL/L — SIGNIFICANT CHANGE UP (ref 3.5–5.3)
POTASSIUM SERPL-SCNC: 3.8 MMOL/L — SIGNIFICANT CHANGE UP (ref 3.5–5.3)
PROT SERPL-MCNC: 6.5 G/DL — SIGNIFICANT CHANGE UP (ref 6–8.3)
PROTHROM AB SERPL-ACNC: 25.2 SEC — HIGH (ref 10.5–13.4)
RAPID RVP RESULT: DETECTED
RBC # BLD: 2.94 M/UL — LOW (ref 4.2–5.8)
RBC # FLD: 17 % — HIGH (ref 10.3–14.5)
RBC BLD AUTO: ABNORMAL
SAO2 % BLDV: 46.7 % — LOW (ref 67–88)
SARS-COV-2 RNA SPEC QL NAA+PROBE: DETECTED
SODIUM SERPL-SCNC: 141 MMOL/L — SIGNIFICANT CHANGE UP (ref 135–145)
SPHEROCYTES BLD QL SMEAR: SLIGHT — SIGNIFICANT CHANGE UP
TROPONIN T, HIGH SENSITIVITY RESULT: 100 NG/L — HIGH (ref 0–51)
TROPONIN T, HIGH SENSITIVITY RESULT: 107 NG/L — HIGH (ref 0–51)
WBC # BLD: 1.73 K/UL — LOW (ref 3.8–10.5)
WBC # FLD AUTO: 1.73 K/UL — LOW (ref 3.8–10.5)

## 2022-12-06 PROCEDURE — 71045 X-RAY EXAM CHEST 1 VIEW: CPT | Mod: 26

## 2022-12-06 PROCEDURE — 99285 EMERGENCY DEPT VISIT HI MDM: CPT

## 2022-12-06 PROCEDURE — 99223 1ST HOSP IP/OBS HIGH 75: CPT

## 2022-12-06 PROCEDURE — 71275 CT ANGIOGRAPHY CHEST: CPT | Mod: 26,MA

## 2022-12-06 PROCEDURE — 93010 ELECTROCARDIOGRAM REPORT: CPT

## 2022-12-06 RX ORDER — ATORVASTATIN CALCIUM 80 MG/1
40 TABLET, FILM COATED ORAL AT BEDTIME
Refills: 0 | Status: DISCONTINUED | OUTPATIENT
Start: 2022-12-06 | End: 2022-12-15

## 2022-12-06 RX ORDER — REMDESIVIR 5 MG/ML
100 INJECTION INTRAVENOUS EVERY 24 HOURS
Refills: 0 | Status: COMPLETED | OUTPATIENT
Start: 2022-12-08 | End: 2022-12-11

## 2022-12-06 RX ORDER — SODIUM CHLORIDE 9 MG/ML
500 INJECTION, SOLUTION INTRAVENOUS ONCE
Refills: 0 | Status: COMPLETED | OUTPATIENT
Start: 2022-12-06 | End: 2022-12-06

## 2022-12-06 RX ORDER — ACYCLOVIR SODIUM 500 MG
400 VIAL (EA) INTRAVENOUS
Refills: 0 | Status: DISCONTINUED | OUTPATIENT
Start: 2022-12-06 | End: 2022-12-15

## 2022-12-06 RX ORDER — REMDESIVIR 5 MG/ML
INJECTION INTRAVENOUS
Refills: 0 | Status: COMPLETED | OUTPATIENT
Start: 2022-12-07 | End: 2022-12-11

## 2022-12-06 RX ORDER — PIPERACILLIN AND TAZOBACTAM 4; .5 G/20ML; G/20ML
3.38 INJECTION, POWDER, LYOPHILIZED, FOR SOLUTION INTRAVENOUS ONCE
Refills: 0 | Status: COMPLETED | OUTPATIENT
Start: 2022-12-06 | End: 2022-12-06

## 2022-12-06 RX ORDER — PIPERACILLIN AND TAZOBACTAM 4; .5 G/20ML; G/20ML
3.38 INJECTION, POWDER, LYOPHILIZED, FOR SOLUTION INTRAVENOUS ONCE
Refills: 0 | Status: COMPLETED | OUTPATIENT
Start: 2022-12-07 | End: 2022-12-07

## 2022-12-06 RX ORDER — FUROSEMIDE 40 MG
40 TABLET ORAL ONCE
Refills: 0 | Status: COMPLETED | OUTPATIENT
Start: 2022-12-06 | End: 2022-12-06

## 2022-12-06 RX ORDER — VANCOMYCIN HCL 1 G
1000 VIAL (EA) INTRAVENOUS ONCE
Refills: 0 | Status: COMPLETED | OUTPATIENT
Start: 2022-12-06 | End: 2022-12-06

## 2022-12-06 RX ORDER — APIXABAN 2.5 MG/1
5 TABLET, FILM COATED ORAL
Refills: 0 | Status: DISCONTINUED | OUTPATIENT
Start: 2022-12-06 | End: 2022-12-15

## 2022-12-06 RX ORDER — LANOLIN ALCOHOL/MO/W.PET/CERES
3 CREAM (GRAM) TOPICAL AT BEDTIME
Refills: 0 | Status: DISCONTINUED | OUTPATIENT
Start: 2022-12-06 | End: 2022-12-15

## 2022-12-06 RX ORDER — ALLOPURINOL 300 MG
100 TABLET ORAL DAILY
Refills: 0 | Status: DISCONTINUED | OUTPATIENT
Start: 2022-12-06 | End: 2022-12-15

## 2022-12-06 RX ORDER — ACETAMINOPHEN 500 MG
1000 TABLET ORAL ONCE
Refills: 0 | Status: COMPLETED | OUTPATIENT
Start: 2022-12-06 | End: 2022-12-06

## 2022-12-06 RX ORDER — REMDESIVIR 5 MG/ML
200 INJECTION INTRAVENOUS EVERY 24 HOURS
Refills: 0 | Status: COMPLETED | OUTPATIENT
Start: 2022-12-07 | End: 2022-12-07

## 2022-12-06 RX ORDER — ONDANSETRON 8 MG/1
4 TABLET, FILM COATED ORAL EVERY 8 HOURS
Refills: 0 | Status: DISCONTINUED | OUTPATIENT
Start: 2022-12-06 | End: 2022-12-15

## 2022-12-06 RX ORDER — PIPERACILLIN AND TAZOBACTAM 4; .5 G/20ML; G/20ML
3.38 INJECTION, POWDER, LYOPHILIZED, FOR SOLUTION INTRAVENOUS EVERY 8 HOURS
Refills: 0 | Status: COMPLETED | OUTPATIENT
Start: 2022-12-07 | End: 2022-12-14

## 2022-12-06 RX ORDER — ASPIRIN/CALCIUM CARB/MAGNESIUM 324 MG
324 TABLET ORAL ONCE
Refills: 0 | Status: COMPLETED | OUTPATIENT
Start: 2022-12-06 | End: 2022-12-06

## 2022-12-06 RX ORDER — ACETAMINOPHEN 500 MG
650 TABLET ORAL EVERY 6 HOURS
Refills: 0 | Status: DISCONTINUED | OUTPATIENT
Start: 2022-12-06 | End: 2022-12-15

## 2022-12-06 RX ADMIN — Medication 324 MILLIGRAM(S): at 20:14

## 2022-12-06 RX ADMIN — Medication 1000 MILLIGRAM(S): at 17:53

## 2022-12-06 RX ADMIN — PIPERACILLIN AND TAZOBACTAM 3.38 GRAM(S): 4; .5 INJECTION, POWDER, LYOPHILIZED, FOR SOLUTION INTRAVENOUS at 17:53

## 2022-12-06 RX ADMIN — Medication 400 MILLIGRAM(S): at 17:09

## 2022-12-06 RX ADMIN — Medication 40 MILLIGRAM(S): at 20:13

## 2022-12-06 RX ADMIN — Medication 1000 MILLIGRAM(S): at 19:12

## 2022-12-06 RX ADMIN — PIPERACILLIN AND TAZOBACTAM 200 GRAM(S): 4; .5 INJECTION, POWDER, LYOPHILIZED, FOR SOLUTION INTRAVENOUS at 17:09

## 2022-12-06 RX ADMIN — Medication 250 MILLIGRAM(S): at 17:53

## 2022-12-06 RX ADMIN — SODIUM CHLORIDE 500 MILLILITER(S): 9 INJECTION, SOLUTION INTRAVENOUS at 17:53

## 2022-12-06 RX ADMIN — SODIUM CHLORIDE 500 MILLILITER(S): 9 INJECTION, SOLUTION INTRAVENOUS at 17:10

## 2022-12-06 NOTE — ED PROVIDER NOTE - PHYSICAL EXAMINATION
Physical Exam:  Gen: tired appearing, awake alert   HEENT: normal conjunctiva, oral mucosa dry  Lung: crackles RLL base  CV: Regular, tachycardic  Abd: soft, NT, ND, no guarding, no rigidity, no rebound tenderness  MSK: no visible deformities  Skin: Warm, well perfused, no leg swelling  ~Krishna Lewis MD (PGY-3)

## 2022-12-06 NOTE — ED ADULT NURSE NOTE - OBJECTIVE STATEMENT
First encounter with the pt, pt received from triage for sent by pcp for possible shingles . Pt is a/ox4 and verbal. Speech is clear and able to speak in full sentences without distress. Airway is patent with no obstruction nor blocking secretions. Breathing is even and unlabored on room air. Pt has strong pulses palpated bilaterally. Pt is SR on the cardiac monitor. Neuro check is wnl. Full rom. Pt denies chest pain, n/v and sob. No acute distress noted. Pt has call light within the reach of the pt at the bedside. Will continue to monitor the pt.

## 2022-12-06 NOTE — ED ADULT NURSE NOTE - NSICDXPASTMEDICALHX_GEN_ALL_CORE_FT
PAST MEDICAL HISTORY:  Atrial flutter, unspecified type     DM type 2 (diabetes mellitus, type 2) Never on insulin    Essential hypertension     Impaired memory     Moderate mitral regurgitation     Non-Hodgkins Lymphoma In [facility] for > 10 years now with relapse    Pleural effusion     Rhinitis, allergic     Systolic heart failure

## 2022-12-06 NOTE — H&P ADULT - NSHPLABSRESULTS_GEN_ALL_CORE
I have reviewed the labs, imaging and ekg. EKG with Sinus tachycardia  Qtc 542 multiple TWI and ST depressions in anterolateral leads similar to prior. CXR w/ possible pleural effusion

## 2022-12-06 NOTE — H&P ADULT - PROBLEM SELECTOR PLAN 2
Note CTA chest findings. Could be residual radiographic findings from recent PNA but given frequent episodes of bacterial pna and pt comorbidities, will cont. to Rx gram negative PNA for now  -Cont. IV Zosyn  -F/u procalcitonin  -Trend wbc and fever curve

## 2022-12-06 NOTE — H&P ADULT - PROBLEM SELECTOR PLAN 3
COVID positive on swab. Oxygenating 100% on RA at rest on my examination.  -Will order Isolation precautions for now  -Will start Remdesivir  -Trend hepatic and renal function  -Supportive care

## 2022-12-06 NOTE — ED PROVIDER NOTE - OBJECTIVE STATEMENT
72M PMH afib/aflutter (on eliquis), HTN, complete heart block s/p PPM, HLD, HFrEF (30% in 09/2022), and DLBCL (tx with R-CHOP in 2003, with relapse in 2009 treated with BR) now with recently diagnosed grade 3 follicular lymphoma BIBEMS from home with fever, SOB, weakness from rehab facility. Unable to characterize his symptoms due to weakness. Pt came home from rehab on 11/26 and has progressively been declining since last week due to weakness. Last chemo was 10/21; follows with [facility]. Was scheduled for pet scan this evening.     Spoke to daughter who states pt defecated on bed (possibly due to weakness) and had a coughing episode with spitting up his denture. Cough started yest per wife

## 2022-12-06 NOTE — H&P ADULT - NSICDXPASTMEDICALHX_GEN_ALL_CORE_FT
PAST MEDICAL HISTORY:  Atrial flutter, unspecified type     DM type 2 (diabetes mellitus, type 2) Never on insulin    Essential hypertension     Impaired memory     Moderate mitral regurgitation     Non-Hodgkins Lymphoma In [facility] for > 10 years now with relapse    Pleural effusion     Rhinitis, allergic     Systolic heart failure

## 2022-12-06 NOTE — H&P ADULT - HISTORY OF PRESENT ILLNESS
Pt awake and alert but too fatigue to respond meaningfully to questions. Daughter provided most of collateral    72 year old male with afib/ aflutter (on eliquis), HTN,  complete heart block s/p PPM, HLD, HFrEF (30% in 09/2022), and DLBCL (tx with R-CHOP in 2003, with relapse in 2009 treated with BR) now with recently diagnosed grade 3 follicular lymphoma last chemo in October p/w cough and SOB. Pt with recent admission to [facility] for pneumonia in November and eventually discharged to rehab. Pt returned home November 26th from rehab somewhat more deconditioned than prior but with no acute complaints. Over the past 2 days pt developed a significant cough associated with ARGUETA and SOB. Today, when pt's cough became so significant he coughed out his dentures daughter became concerned enough to bring him to hospital. Daughter checks pulse oximetry regularly at home and has not dropped below 95. Pt denies any chest pain, SOB, dizziness or palpitations right now.    In ER: Given IV Zosyn, asa 324mg, IV vancomycin, , Tylenol 1gm IVPB, Lasix 40mg IV

## 2022-12-06 NOTE — H&P ADULT - PROBLEM SELECTOR PLAN 7
Relapsed DLBCL, currently being treated with O-COEP.   -Patient to follow up with Dr. Cordova at [facility]   -Reportedly did not get chemo scheduled for 11/11

## 2022-12-06 NOTE — ED PROVIDER NOTE - PROGRESS NOTE DETAILS
kevon; pt working to breath, bnp 21,000, will give lasix 40 and put on bipap. will call heme/onc to let pt know he is here.

## 2022-12-06 NOTE — ED PROVIDER NOTE - NS ED ROS FT
CONST: +fevers, +chills  ENT: no sore throat  CV: +chest pain, no leg swelling  RESP: +shortness of breath, +cough  ABD: no abdominal pain, no nausea, no vomiting, no diarrhea  : no dysuria, no flank pain, no hematuria  MSK: no back pain, no extremity pain  SKIN:  no rash

## 2022-12-06 NOTE — H&P ADULT - PROBLEM SELECTOR PLAN 5
11/2022 TTE w/ EF 30-35%. Possibly has degree of CHF exacerbation given elevated BNP but s/p IV lasix seems relatively euvolemic/ slightly dry to me  -Daughter states pt now off Entresto, metoprolol and spironolactone  -Strict I/Os and daily weights  -Vital signs q4hrs

## 2022-12-06 NOTE — ED PROVIDER NOTE - ATTENDING CONTRIBUTION TO CARE
This is a 72-year-old male coming in with 1 to 2 days of cough and fever and shortness of breath notably he is got lymphoma  Moderate tachypnea with mild to moderate respiratory distress  Scattered crackles  COVID versus pneumonia most likely.  Patients will require admission as he is on oxygen.  X-ray.  Nose swab.  Vanco and Zosyn.  Admit to medicine.

## 2022-12-06 NOTE — ED PROVIDER NOTE - CLINICAL SUMMARY MEDICAL DECISION MAKING FREE TEXT BOX
72M PMH afib/aflutter (on eliquis), HTN, complete heart block s/p PPM, HLD, HFrEF (30% in 09/2022), and DLBCL (tx with R-CHOP in 2003, with relapse in 2009 treated with BR) now with recently diagnosed grade 3 follicular lymphoma BIBEMS for weakness, cough, SOB. Febrile, tachycardic, mildly tachypneic, rest of VSS. Crackles RLL, abd NT, no leg swelling  Likely infectious pulmonary etiology leading to symptoms. Unlikely ACS given pt is febrile in setting of his presentation. Will also eval for possible viral etiology of symptoms  Plan: sepsis/cardiac labs, vanc/zosyn, ivf, CT chest, reassess symptoms

## 2022-12-06 NOTE — H&P ADULT - PROBLEM SELECTOR PLAN 1
Meets criteria by leukopenia, fever and HR. Likely source is pulmonary infection. COVID-19 +/- bacterial PNA.  -Trend fever curve and leukocytosis  -Given infection and elevated BNP will watch off additional IVF for now  -F/u BCxs and UCx  -Consider ID consult

## 2022-12-06 NOTE — H&P ADULT - ASSESSMENT
72 year old male with afib/ aflutter (on eliquis), HTN,  complete heart block s/p PPM, HLD, HFrEF (30% in 09/2022), and DLBCL (tx with R-CHOP in 2003, with relapse in 2009 treated with BR) now with recently diagnosed grade 3 follicular lymphoma last chemo in October p/w cough and SOB found to have sepsis 2/2 COVID-19 infection and possibly bacterial PNA

## 2022-12-06 NOTE — ED ADULT NURSE NOTE - IN THE PAST 12 MONTHS HAVE YOU USED DRUGS OTHER THAN THOSE REQUIRED FOR MEDICAL REASON?
Detail Level: Detailed Additional Notes: Patient does not get flu shots. Quality 110: Preventive Care And Screening: Influenza Immunization: Influenza Immunization not Administered for Documented Reasons. No

## 2022-12-06 NOTE — ED PROVIDER NOTE - NSICDXPASTMEDICALHX_GEN_ALL_CORE_FT
PAST MEDICAL HISTORY:  Atrial flutter, unspecified type     DM type 2 (diabetes mellitus, type 2) Never on insulin    Essential hypertension     Impaired memory     Moderate mitral regurgitation     Non-Hodgkins Lymphoma In [facility] for > 10 years now with relapse    Pleural effusion     Rhinitis, allergic     Systolic heart failure

## 2022-12-06 NOTE — H&P ADULT - NSHPPHYSICALEXAM_GEN_ALL_CORE
Vital Signs Last 24 Hrs  T(C): 37.1 (12-06-22 @ 20:28), Max: 38.8 (12-06-22 @ 16:01)  T(F): 98.7 (12-06-22 @ 20:28), Max: 101.9 (12-06-22 @ 16:01)  HR: 106 (12-06-22 @ 20:28) (106 - 123)  BP: 133/64 (12-06-22 @ 20:28) (120/70 - 133/64)  BP(mean): --  RR: 20 (12-06-22 @ 20:28) (20 - 28)  SpO2: 98% (12-06-22 @ 20:28) (97% - 100%)

## 2022-12-07 LAB
ALBUMIN SERPL ELPH-MCNC: 3.1 G/DL — LOW (ref 3.3–5)
ALP SERPL-CCNC: 105 U/L — SIGNIFICANT CHANGE UP (ref 40–120)
ALT FLD-CCNC: 16 U/L — SIGNIFICANT CHANGE UP (ref 10–45)
ANION GAP SERPL CALC-SCNC: 12 MMOL/L — SIGNIFICANT CHANGE UP (ref 5–17)
ANION GAP SERPL CALC-SCNC: 16 MMOL/L — SIGNIFICANT CHANGE UP (ref 5–17)
AST SERPL-CCNC: 30 U/L — SIGNIFICANT CHANGE UP (ref 10–40)
BILIRUB SERPL-MCNC: 1 MG/DL — SIGNIFICANT CHANGE UP (ref 0.2–1.2)
BUN SERPL-MCNC: 39 MG/DL — HIGH (ref 7–23)
BUN SERPL-MCNC: 49 MG/DL — HIGH (ref 7–23)
CALCIUM SERPL-MCNC: 8.5 MG/DL — SIGNIFICANT CHANGE UP (ref 8.4–10.5)
CALCIUM SERPL-MCNC: 8.8 MG/DL — SIGNIFICANT CHANGE UP (ref 8.4–10.5)
CHLORIDE SERPL-SCNC: 103 MMOL/L — SIGNIFICANT CHANGE UP (ref 96–108)
CHLORIDE SERPL-SCNC: 104 MMOL/L — SIGNIFICANT CHANGE UP (ref 96–108)
CO2 SERPL-SCNC: 23 MMOL/L — SIGNIFICANT CHANGE UP (ref 22–31)
CO2 SERPL-SCNC: 24 MMOL/L — SIGNIFICANT CHANGE UP (ref 22–31)
CREAT SERPL-MCNC: 1.3 MG/DL — SIGNIFICANT CHANGE UP (ref 0.5–1.3)
CREAT SERPL-MCNC: 1.45 MG/DL — HIGH (ref 0.5–1.3)
EGFR: 52 ML/MIN/1.73M2 — LOW
EGFR: 59 ML/MIN/1.73M2 — LOW
GLUCOSE SERPL-MCNC: 103 MG/DL — HIGH (ref 70–99)
GLUCOSE SERPL-MCNC: 99 MG/DL — SIGNIFICANT CHANGE UP (ref 70–99)
HCT VFR BLD CALC: 23.5 % — LOW (ref 39–50)
HGB BLD-MCNC: 7.2 G/DL — LOW (ref 13–17)
MAGNESIUM SERPL-MCNC: 1.5 MG/DL — LOW (ref 1.6–2.6)
MCHC RBC-ENTMCNC: 26.8 PG — LOW (ref 27–34)
MCHC RBC-ENTMCNC: 30.6 GM/DL — LOW (ref 32–36)
MCV RBC AUTO: 87.4 FL — SIGNIFICANT CHANGE UP (ref 80–100)
MRSA PCR RESULT.: SIGNIFICANT CHANGE UP
NRBC # BLD: 0 /100 WBCS — SIGNIFICANT CHANGE UP (ref 0–0)
PLATELET # BLD AUTO: 144 K/UL — LOW (ref 150–400)
POTASSIUM SERPL-MCNC: 2.6 MMOL/L — CRITICAL LOW (ref 3.5–5.3)
POTASSIUM SERPL-MCNC: 3.5 MMOL/L — SIGNIFICANT CHANGE UP (ref 3.5–5.3)
POTASSIUM SERPL-SCNC: 2.6 MMOL/L — CRITICAL LOW (ref 3.5–5.3)
POTASSIUM SERPL-SCNC: 3.5 MMOL/L — SIGNIFICANT CHANGE UP (ref 3.5–5.3)
PROCALCITONIN SERPL-MCNC: 6.13 NG/ML — HIGH (ref 0.02–0.1)
PROT SERPL-MCNC: 5.9 G/DL — LOW (ref 6–8.3)
RBC # BLD: 2.69 M/UL — LOW (ref 4.2–5.8)
RBC # FLD: 17 % — HIGH (ref 10.3–14.5)
S AUREUS DNA NOSE QL NAA+PROBE: DETECTED
SODIUM SERPL-SCNC: 139 MMOL/L — SIGNIFICANT CHANGE UP (ref 135–145)
SODIUM SERPL-SCNC: 143 MMOL/L — SIGNIFICANT CHANGE UP (ref 135–145)
WBC # BLD: 1.52 K/UL — LOW (ref 3.8–10.5)
WBC # FLD AUTO: 1.52 K/UL — LOW (ref 3.8–10.5)

## 2022-12-07 PROCEDURE — 99223 1ST HOSP IP/OBS HIGH 75: CPT

## 2022-12-07 PROCEDURE — 99233 SBSQ HOSP IP/OBS HIGH 50: CPT

## 2022-12-07 RX ORDER — CHLORHEXIDINE GLUCONATE 213 G/1000ML
1 SOLUTION TOPICAL DAILY
Refills: 0 | Status: DISCONTINUED | OUTPATIENT
Start: 2022-12-07 | End: 2022-12-15

## 2022-12-07 RX ORDER — POTASSIUM CHLORIDE 20 MEQ
10 PACKET (EA) ORAL
Refills: 0 | Status: COMPLETED | OUTPATIENT
Start: 2022-12-07 | End: 2022-12-07

## 2022-12-07 RX ORDER — POTASSIUM CHLORIDE 20 MEQ
40 PACKET (EA) ORAL ONCE
Refills: 0 | Status: COMPLETED | OUTPATIENT
Start: 2022-12-07 | End: 2022-12-07

## 2022-12-07 RX ORDER — IBUPROFEN 200 MG
600 TABLET ORAL ONCE
Refills: 0 | Status: COMPLETED | OUTPATIENT
Start: 2022-12-07 | End: 2022-12-07

## 2022-12-07 RX ORDER — ALBUTEROL 90 UG/1
2 AEROSOL, METERED ORAL
Refills: 0 | Status: DISCONTINUED | OUTPATIENT
Start: 2022-12-07 | End: 2022-12-15

## 2022-12-07 RX ORDER — MAGNESIUM SULFATE 500 MG/ML
1 VIAL (ML) INJECTION ONCE
Refills: 0 | Status: COMPLETED | OUTPATIENT
Start: 2022-12-07 | End: 2022-12-07

## 2022-12-07 RX ADMIN — PIPERACILLIN AND TAZOBACTAM 200 GRAM(S): 4; .5 INJECTION, POWDER, LYOPHILIZED, FOR SOLUTION INTRAVENOUS at 01:04

## 2022-12-07 RX ADMIN — Medication 400 MILLIGRAM(S): at 06:13

## 2022-12-07 RX ADMIN — Medication 100 GRAM(S): at 08:58

## 2022-12-07 RX ADMIN — PIPERACILLIN AND TAZOBACTAM 25 GRAM(S): 4; .5 INJECTION, POWDER, LYOPHILIZED, FOR SOLUTION INTRAVENOUS at 06:13

## 2022-12-07 RX ADMIN — Medication 3 MILLIGRAM(S): at 21:26

## 2022-12-07 RX ADMIN — REMDESIVIR 500 MILLIGRAM(S): 5 INJECTION INTRAVENOUS at 11:55

## 2022-12-07 RX ADMIN — ALBUTEROL 2 PUFF(S): 90 AEROSOL, METERED ORAL at 17:13

## 2022-12-07 RX ADMIN — PIPERACILLIN AND TAZOBACTAM 25 GRAM(S): 4; .5 INJECTION, POWDER, LYOPHILIZED, FOR SOLUTION INTRAVENOUS at 14:20

## 2022-12-07 RX ADMIN — APIXABAN 5 MILLIGRAM(S): 2.5 TABLET, FILM COATED ORAL at 06:13

## 2022-12-07 RX ADMIN — CHLORHEXIDINE GLUCONATE 1 APPLICATION(S): 213 SOLUTION TOPICAL at 14:20

## 2022-12-07 RX ADMIN — Medication 100 MILLIEQUIVALENT(S): at 08:58

## 2022-12-07 RX ADMIN — Medication 40 MILLIEQUIVALENT(S): at 08:58

## 2022-12-07 RX ADMIN — Medication 650 MILLIGRAM(S): at 01:15

## 2022-12-07 RX ADMIN — Medication 100 MILLIGRAM(S): at 11:59

## 2022-12-07 RX ADMIN — APIXABAN 5 MILLIGRAM(S): 2.5 TABLET, FILM COATED ORAL at 17:11

## 2022-12-07 RX ADMIN — ATORVASTATIN CALCIUM 40 MILLIGRAM(S): 80 TABLET, FILM COATED ORAL at 21:25

## 2022-12-07 RX ADMIN — Medication 400 MILLIGRAM(S): at 17:12

## 2022-12-07 RX ADMIN — Medication 100 MILLIEQUIVALENT(S): at 17:07

## 2022-12-07 RX ADMIN — Medication 100 MILLIEQUIVALENT(S): at 11:55

## 2022-12-07 RX ADMIN — PIPERACILLIN AND TAZOBACTAM 25 GRAM(S): 4; .5 INJECTION, POWDER, LYOPHILIZED, FOR SOLUTION INTRAVENOUS at 21:26

## 2022-12-07 RX ADMIN — Medication 1200 MILLIGRAM(S): at 17:11

## 2022-12-07 RX ADMIN — Medication 600 MILLIGRAM(S): at 03:50

## 2022-12-07 RX ADMIN — Medication 600 MILLIGRAM(S): at 02:09

## 2022-12-07 NOTE — PROGRESS NOTE ADULT - SUBJECTIVE AND OBJECTIVE BOX
Andre Reyes, M.D.  Pager: [phone]  Office: [phone]    Patient is a 72y old  Male who presents with a chief complaint of SOB, Cough and sepsis (07 Dec 2022 12:33)          SUBJECTIVE / OVERNIGHT EVENTS:    No acute overnight events.    ROS: ( - ) Fever, ( - )Chills,  ( - )Nausea/Vomiting, ( - ) Cough, ( - )Shortness of breath, ( - )Chest Pain    MEDICATIONS  (STANDING):  acyclovir   Oral Tab/Cap 400 milliGRAM(s) Oral two times a day  allopurinol 100 milliGRAM(s) Oral daily  apixaban 5 milliGRAM(s) Oral two times a day  atorvastatin 40 milliGRAM(s) Oral at bedtime  chlorhexidine 2% Cloths 1 Application(s) Topical daily  piperacillin/tazobactam IVPB.. 3.375 Gram(s) IV Intermittent every 8 hours  potassium chloride  10 mEq/100 mL IVPB 10 milliEquivalent(s) IV Intermittent every 1 hour  remdesivir  IVPB   IV Intermittent     MEDICATIONS  (PRN):  acetaminophen     Tablet .. 650 milliGRAM(s) Oral every 6 hours PRN Temp greater or equal to 38C (100.4F), Mild Pain (1 - 3)  melatonin 3 milliGRAM(s) Oral at bedtime PRN Insomnia  ondansetron Injectable 4 milliGRAM(s) IV Push every 8 hours PRN Nausea and/or Vomiting          T(C): 36.3 (12-07 @ 10:47), Max: 38.9 (12-07 @ 01:46)   HR: 85   BP: 108/61   RR: 18   SpO2: 98%    PHYSICAL EXAM:    CONSTITUTIONAL: NAD, well-developed, well-groomed  EYES: PERRLA; conjunctiva and sclera clear  ENMT: Moist oral mucosa, no pharyngeal injection or exudates; normal dentition  NECK: Supple, no palpable masses; no thyromegaly  RESPIRATORY: Normal respiratory effort; lungs are clear to auscultation bilaterally  CARDIOVASCULAR: Regular rate and rhythm, normal S1 and S2, no murmur/rub/gallop; No lower extremity edema; Peripheral pulses are 2+ bilaterally  ABDOMEN: Nontender to palpation, normoactive bowel sounds, no rebound/guarding; No hepatosplenomegaly  MUSCULOSKELETAL:  no clubbing or cyanosis of digits; no joint swelling or tenderness to palpation  PSYCH: A+O to person, place, and time; affect appropriate  NEUROLOGY: CN 2-12 are intact and symmetric; no gross sensory deficits   SKIN: No rashes; no palpable lesions      LABS:                        7.2    1.52  )-----------( 144      ( 07 Dec 2022 07:27 )             23.5      12-07    143  |  103  |  39<H>  ----------------------------<  99  2.6<LL>   |  24  |  1.30    Ca    8.5      07 Dec 2022 07:26  Mg     1.5     12-07    TPro  5.9<L>  /  Alb  3.1<L>  /  TBili  1.0  /  DBili  x   /  AST  30  /  ALT  16  /  AlkPhos  105  12-07       CAPILLARY BLOOD GLUCOSE          RADIOLOGY & ADDITIONAL TESTS:    Imaging Personally Reviewed:  Consultant(s) Notes Reviewed:    Care Discussed with Consultants/Other Providers:   Andre Reyes, M.D.  Pager: [phone]  Office: [phone]    Patient is a 72y old  Male who presents with a chief complaint of SOB, Cough and sepsis (07 Dec 2022 12:33)          SUBJECTIVE / OVERNIGHT EVENTS:    No acute overnight events.  feels weak   denies any productive cough  ROS: ( - ) Fever, ( - )Chills,  ( - )Nausea/Vomiting, ( - ) Cough, ( + )Shortness of breath, ( - )Chest Pain    MEDICATIONS  (STANDING):  acyclovir   Oral Tab/Cap 400 milliGRAM(s) Oral two times a day  allopurinol 100 milliGRAM(s) Oral daily  apixaban 5 milliGRAM(s) Oral two times a day  atorvastatin 40 milliGRAM(s) Oral at bedtime  chlorhexidine 2% Cloths 1 Application(s) Topical daily  piperacillin/tazobactam IVPB.. 3.375 Gram(s) IV Intermittent every 8 hours  potassium chloride  10 mEq/100 mL IVPB 10 milliEquivalent(s) IV Intermittent every 1 hour  remdesivir  IVPB   IV Intermittent     MEDICATIONS  (PRN):  acetaminophen     Tablet .. 650 milliGRAM(s) Oral every 6 hours PRN Temp greater or equal to 38C (100.4F), Mild Pain (1 - 3)  melatonin 3 milliGRAM(s) Oral at bedtime PRN Insomnia  ondansetron Injectable 4 milliGRAM(s) IV Push every 8 hours PRN Nausea and/or Vomiting          T(C): 36.3 (12-07 @ 10:47), Max: 38.9 (12-07 @ 01:46)   HR: 85   BP: 108/61   RR: 18   SpO2: 98%    PHYSICAL EXAM:    CONSTITUTIONAL: NAD, appears weak a  EYES: PERRLA; conjunctiva and sclera clear  ENMT: Moist oral mucosa, no pharyngeal injection or exudates; normal dentition  NECK: Supple, no palpable masses; no thyromegaly  RESPIRATORY: Normal respiratory effort; lungs are clear to auscultation bilaterally  CARDIOVASCULAR: Regular rate and rhythm, normal S1 and S2, no murmur/rub/gallop; No lower extremity edema; Peripheral pulses are 2+ bilaterally  ABDOMEN: Nontender to palpation, normoactive bowel sounds, no rebound/guarding; No hepatosplenomegaly  MUSCULOSKELETAL:  no clubbing or cyanosis of digits; no joint swelling or tenderness to palpation  PSYCH: A+O to person, place, and time; affect appropriate  NEUROLOGY: CN 2-12 are intact and symmetric; no gross sensory deficits   SKIN: No rashes; no palpable lesions      LABS:                        7.2    1.52  )-----------( 144      ( 07 Dec 2022 07:27 )             23.5      12-07    143  |  103  |  39<H>  ----------------------------<  99  2.6<LL>   |  24  |  1.30    Ca    8.5      07 Dec 2022 07:26  Mg     1.5     12-07    TPro  5.9<L>  /  Alb  3.1<L>  /  TBili  1.0  /  DBili  x   /  AST  30  /  ALT  16  /  AlkPhos  105  12-07       CAPILLARY BLOOD GLUCOSE          RADIOLOGY & ADDITIONAL TESTS:    Imaging Personally Reviewed:  Consultant(s) Notes Reviewed:    Care Discussed with Consultants/Other Providers:

## 2022-12-07 NOTE — CONSULT NOTE ADULT - COMMENTS
[  ] All other systems negative aside from above.  [X] ROS unobtainable because:  Patient not willing to provide ROS, uncooperative, combative

## 2022-12-07 NOTE — CONSULT NOTE ADULT - ATTENDING COMMENTS
The patient is treated by Dr Cordova for diffuse large B cell lymphoma; he has mild neutropenia and has had recent treatment with obituzumab. He now is admitted with pneumonia and has treaty for COVID 19

## 2022-12-07 NOTE — PATIENT PROFILE ADULT - FALL HARM RISK - HARM RISK INTERVENTIONS

## 2022-12-07 NOTE — CONSULT NOTE ADULT - SUBJECTIVE AND OBJECTIVE BOX
"HPI:  Pt awake and alert but too fatigue to respond meaningfully to questions. Daughter provided most of collateral    72 year old male with afib/ aflutter (on eliquis), HTN,  complete heart block s/p PPM, HLD, HFrEF (30% in 09/2022), and DLBCL (tx with R-CHOP in 2003, with relapse in 2009 treated with BR) now with recently diagnosed grade 3 follicular lymphoma last chemo in October p/w cough and SOB. Pt with recent admission to [facility] for pneumonia in November and eventually discharged to rehab. Pt returned home November 26th from rehab somewhat more deconditioned than prior but with no acute complaints. Over the past 2 days pt developed a significant cough associated with ARGUETA and SOB. Today, when pt's cough became so significant he coughed out his dentures daughter became concerned enough to bring him to hospital. Daughter checks pulse oximetry regularly at home and has not dropped below 95. Pt denies any chest pain, SOB, dizziness or palpitations right now.    In ER: Given IV Zosyn, asa 324mg, IV vancomycin, , Tylenol 1gm IVPB, Lasix 40mg IV (06 Dec 2022 21:16)"    Above reviewed. 71 yo M A Fib, HTN, CHB PPM, DLBCL, initially with cough/sob  Patient presenting with coughing and SOB  Patient with history of follicular lymphoma--last chemo Oct  Today patient uncooperative--when interviewed regarding condition replies with obscenities, not willing to provide history/symptoms  Otherwise patient dx with COVID, concern for possible bacterial process  ID called for further eval    PAST MEDICAL & SURGICAL HISTORY:  Non-Hodgkins Lymphoma  In [facility] for &gt; 10 years now with relapse    DM type 2 (diabetes mellitus, type 2)  Never on insulin    Essential hypertension    Rhinitis, allergic    Systolic heart failure    Moderate mitral regurgitation    Pleural effusion    Atrial flutter, unspecified type    Impaired memory    Non-Hodgkin lymphoma  h/o axillary dissection    Cardiac pacemaker    Allergies    rasburicase (Other)    Intolerances    ANTIMICROBIALS:  acyclovir   Oral Tab/Cap 400 two times a day  piperacillin/tazobactam IVPB.. 3.375 every 8 hours  remdesivir  IVPB      OTHER MEDS:  acetaminophen     Tablet .. 650 milliGRAM(s) Oral every 6 hours PRN  allopurinol 100 milliGRAM(s) Oral daily  apixaban 5 milliGRAM(s) Oral two times a day  atorvastatin 40 milliGRAM(s) Oral at bedtime  chlorhexidine 2% Cloths 1 Application(s) Topical daily  melatonin 3 milliGRAM(s) Oral at bedtime PRN  ondansetron Injectable 4 milliGRAM(s) IV Push every 8 hours PRN  potassium chloride  10 mEq/100 mL IVPB 10 milliEquivalent(s) IV Intermittent every 1 hour    SOCIAL HISTORY: No tobacco, no alcohol, no illicit drugs    FAMILY HISTORY:  Family history of CHF (congestive heart failure)  Mother    Family history of non-Hodgkin&#x27;s lymphoma (Sibling)    Drug Dosing Weight  Height (cm): 183.5 (06 Dec 2022 15:12)  Weight (kg): 56.7 (06 Dec 2022 15:12)  BMI (kg/m2): 16.8 (06 Dec 2022 15:12)  BSA (m2): 1.75 (06 Dec 2022 15:12)    PE:    Vital Signs Last 24 Hrs  T(C): 36.3 (07 Dec 2022 10:47), Max: 38.9 (07 Dec 2022 01:46)  T(F): 97.3 (07 Dec 2022 10:47), Max: 102 (07 Dec 2022 01:46)  HR: 85 (07 Dec 2022 10:47) (65 - 123)  BP: 108/61 (07 Dec 2022 10:47) (108/61 - 133/64)  BP(mean): 73 (07 Dec 2022 01:16) (73 - 73)  RR: 18 (07 Dec 2022 10:47) (18 - 28)  SpO2: 98% (07 Dec 2022 10:47) (95% - 100%)    Gen: AOx3, NAD, non-toxic, uncooperative  CV: S1+S2 normal, nontachycardic  Resp: Clear bilat, no resp distress, no crackles/wheezes  Abd: Soft, nontender, +BS  Ext: No LE edema, no wounds  : No Peralta  IV/Skin: No thrombophlebitis  Msk: No low back pain, no arthralgias, no joint swelling  Neuro: No sensory deficits, no motor deficits    LABS:                        7.2    1.52  )-----------( 144      ( 07 Dec 2022 07:27 )             23.5     12-07    143  |  103  |  39<H>  ----------------------------<  99  2.6<LL>   |  24  |  1.30    Ca    8.5      07 Dec 2022 07:26  Mg     1.5     12-07    TPro  5.9<L>  /  Alb  3.1<L>  /  TBili  1.0  /  DBili  x   /  AST  30  /  ALT  16  /  AlkPhos  105  12-07    MICROBIOLOGY:    Rapid RVP Result: Detected (12-06 @ 17:04) COVID    RADIOLOGY:    12/6 CT:    IMPRESSION:  No pulmonary embolism.    Redemonstration of multiple consolidative opacities throughout the lungs,   some of which are decreased and some of which are increased/new.    A loculated effusion involving the minor fissure is decreased in size.

## 2022-12-07 NOTE — PHYSICAL THERAPY INITIAL EVALUATION ADULT - ADDITIONAL COMMENTS
Patient is poor historian, per EMR he lives alone in an apartment with elevator access. He was independent, however recently discharged home from [facility].

## 2022-12-07 NOTE — PHYSICAL THERAPY INITIAL EVALUATION ADULT - PERTINENT HX OF CURRENT PROBLEM, REHAB EVAL
72 y.o M w/ afib/ aflutter (on eliquis), HTN,  complete heart block s/p PPM, HLD, HFrEF, and DLBCL (tx with R-CHOP in 2003, with relapse in 2009 treated with BR) now with recently diagnosed grade 3 follicular lymphoma last chemo in October p/w cough and SOB. Pt with recent admission to [facility] for pneumonia in November and eventually discharged to rehab. Pt returned home November 26th from rehab somewhat more deconditioned than prior but with no acute complaints. Over the past 2 days pt developed a significant cough associated with ARGUETA and SOB. Today, when pt's cough became so significant he coughed out his dentures daughter became concerned enough to bring him to hospital. Daughter checks pulse oximetry regularly at home and has not dropped below 95. Pt denies any chest pain, SOB, dizziness or palpitations right now.

## 2022-12-07 NOTE — CONSULT NOTE ADULT - ASSESSMENT
73 yo M A Fib, HTN, CHB PPM, DLBCL, initially with cough/sob  Fever, Leukopenia  Last chemo was end of Oct--Nov chemo delayed due to encephalopathy/PNA  Presents with cough/sob  COVID+  CT with multiple consolidative opacities through lungs  Note that above CT findings were demonstrated on prior CT, but some new areas of consolidations--mixed picture with some areas improved, some worsened?  Difficult to determine COVID vs bacterial process (or superimposed), but given immunocompromise, favor treating  Overall,  1) COVID  - RA  - RemD 5 days then DC--monitor Cr/LFTs  - Hold on dexa unless progressive O2 requirements  - Supportive care  - O2 supplementation per team  2) Abnormal finding on imaging  - Areas of worsening on CT chest, given immunocompromise, would not presume is COVID alone  - Zosyn 3.375g q 8  - Monitor resp status  - If producing sputum, check sputum culture  3) Leukopenia  - Due to COVID vs underlying malignancy?  - Trend to normal  - F/U Heme Onc lenal    Christiano Ny MD  Contact on TEAMS messaging from 9am - 5pm  From 5pm-9am, on weekends, or if no response call [phone]
72M PMH afib/aflutter (on eliquis), HTN, complete heart block s/p PPM, HLD, HFrEF (30% in 09/2022), and DLBCL (tx with R-CHOP in 2003, with relapse in 2009 treated with BR) now with recently diagnosed grade 3 follicular lymphoma BIBEMS for weakness, cough, SOB. Febrile, tachycardic, tachypneic requiring BiPAP- now off as was not tolerating. CTA chest showing loculated pleural effusion along with opacities. No PE. Admitted for sepsis 2/2 PNA.        # DLBCL  # Pancytopenia  - patient most recently had mini COEP with Obinutuzumab  - Per outpatient chart, there was a plan for treating patient with revlimid and obintuzumab but patient denies taking revlimid at home  - Patient now s/p three cycles, most recently on 10/21/22/-10/24/22, with neulasta.   - patient is due for next cycle (C4) of chemo 11/11/22 however recent admission for encephalopathy with sepsis 2/2 PNA and treatment held.   - Planned for outpatient PET/CT 12/6 however decompensated with sob/weakness/fatigue and came to the ER.   - Would hold off chemo while inpatient as patient is actively infected with COVID  - pancytopenia likely secondary to COVID infection.  # Sepsis secondary to COVID 19 infection  - continue with dexa, remdesivir

## 2022-12-07 NOTE — CONSULT NOTE ADULT - SUBJECTIVE AND OBJECTIVE BOX
HPI:     Per chart review,    72 year old male with afib/ aflutter (on eliquis), HTN,  complete heart block s/p PPM, HLD, HFrEF (30% in 09/2022), and DLBCL (tx with R-CHOP in 2003, with relapse in 2009 treated with BR) now with recently diagnosed grade 3 follicular lymphoma last chemo in October p/w cough and SOB. Pt with recent admission to [facility] for pneumonia in November and eventually discharged to rehab. Pt returned home November 26th from rehab somewhat more deconditioned than prior but with no acute complaints. Over the past 2 days pt developed a significant cough associated with ARGUETA and SOB. Today, when pt's cough became so significant he coughed out his dentures daughter became concerned enough to bring him to hospital. Daughter checks pulse oximetry regularly at home and has not dropped below 95. Pt denies any chest pain, SOB, dizziness or palpitations right now.    In ER: Given IV Zosyn, asa 324mg, IV vancomycin, , Tylenol 1gm IVPB, Lasix 40mg IV    Patient is found to have COVID infection. CT chest with multifocal consolidation.       Heme history     DLBCL originally diagnosed in 2003 s/p RCHOP with relapse in 2009 s/p BR. 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 Had excisional biopsy but was performed 9/27/22 after starting treatment, with pathology showing DLBCL. Patient is on treatment with a modified regimen of mini-COEP plus Obinutuzumab. C1 Started on 9/1/22 (cycle length q21 days) but full dose obinutuzumab was started 9/2/22. Patient now s/p three cycles, most recently on 10/21/22/-10/24/22, with neulasta. Was due for next cycle (C4) of chemo 11/11/22 however recent admission for encephalopathy with sepsis 2/2 PNA and treatment held. Planned for outpatient PET/CT 12/6 however decompensated with sob/weakness/fatigue and came to the ER.            PAST MEDICAL & SURGICAL HISTORY:  Non-Hodgkins Lymphoma  In [facility] for &gt; 10 years now with relapse      DM type 2 (diabetes mellitus, type 2)  Never on insulin      Essential hypertension      Rhinitis, allergic      Systolic heart failure      Moderate mitral regurgitation      Pleural effusion      Atrial flutter, unspecified type      Impaired memory      Non-Hodgkin lymphoma  h/o axillary dissection      Cardiac pacemaker          Allergies    rasburicase (Other)    Intolerances        MEDICATIONS  (STANDING):  acyclovir   Oral Tab/Cap 400 milliGRAM(s) Oral two times a day  allopurinol 100 milliGRAM(s) Oral daily  apixaban 5 milliGRAM(s) Oral two times a day  atorvastatin 40 milliGRAM(s) Oral at bedtime  chlorhexidine 2% Cloths 1 Application(s) Topical daily  piperacillin/tazobactam IVPB.. 3.375 Gram(s) IV Intermittent every 8 hours  potassium chloride  10 mEq/100 mL IVPB 10 milliEquivalent(s) IV Intermittent every 1 hour  remdesivir  IVPB 200 milliGRAM(s) IV Intermittent every 24 hours  remdesivir  IVPB   IV Intermittent     MEDICATIONS  (PRN):  acetaminophen     Tablet .. 650 milliGRAM(s) Oral every 6 hours PRN Temp greater or equal to 38C (100.4F), Mild Pain (1 - 3)  melatonin 3 milliGRAM(s) Oral at bedtime PRN Insomnia  ondansetron Injectable 4 milliGRAM(s) IV Push every 8 hours PRN Nausea and/or Vomiting      FAMILY HISTORY:  Family history of CHF (congestive heart failure)  Mother    Family history of non-Hodgkin&#x27;s lymphoma (Sibling)        SOCIAL HISTORY: No EtOH, no tobacco    REVIEW OF SYSTEMS:    CONSTITUTIONAL: No weakness, fevers or chills  EYES/ENT: No visual changes;  No vertigo or throat pain   NECK: No pain or stiffness  RESPIRATORY: No cough, wheezing, hemoptysis; No shortness of breath  CARDIOVASCULAR: No chest pain or palpitations  GASTROINTESTINAL: No abdominal or epigastric pain. No nausea, vomiting, or hematemesis; No diarrhea or constipation. No melena or hematochezia.  GENITOURINARY: No dysuria, frequency or hematuria  NEUROLOGICAL: No numbness or weakness  SKIN: No itching, burning, rashes, or lesions   All other review of systems is negative unless indicated above.    Height (cm): 183.5 (12-06 @ 15:12)  Weight (kg): 56.7 (12-06 @ 15:12)  BMI (kg/m2): 16.8 (12-06 @ 15:12)  BSA (m2): 1.75 (12-06 @ 15:12)    T(F): 97.3 (12-07-22 @ 10:47), Max: 102 (12-07-22 @ 01:46)  HR: 85 (12-07-22 @ 10:47)  BP: 108/61 (12-07-22 @ 10:47)  RR: 18 (12-07-22 @ 10:47)  SpO2: 98% (12-07-22 @ 10:47)  Wt(kg): --    GENERAL: NAD, well-developed  HEAD:  Atraumatic, Normocephalic  EYES: EOMI, PERRLA, conjunctiva and sclera clear  NECK: Supple, No JVD  CHEST/LUNG: Clear to auscultation bilaterally; No wheeze  HEART: Regular rate and rhythm; No murmurs, rubs, or gallops  ABDOMEN: Soft, Nontender, Nondistended; Bowel sounds present  EXTREMITIES:  2+ Peripheral Pulses, No clubbing, cyanosis, or edema  NEUROLOGY: non-focal  SKIN: No rashes or lesions                          7.2    1.52  )-----------( 144      ( 07 Dec 2022 07:27 )             23.5       12-07    143  |  103  |  39<H>  ----------------------------<  99  2.6<LL>   |  24  |  1.30    Ca    8.5      07 Dec 2022 07:26  Mg     1.5     12-07    TPro  5.9<L>  /  Alb  3.1<L>  /  TBili  1.0  /  DBili  x   /  AST  30  /  ALT  16  /  AlkPhos  105  12-07      Magnesium, Serum: 1.5 mg/dL (12-07 @ 07:26)      PT/INR - ( 06 Dec 2022 17:05 )   PT: 25.2 sec;   INR: 2.17 ratio         PTT - ( 06 Dec 2022 17:05 )  PTT:37.3 sec    .Blood Blood-Peripheral  10-31 @ 10:58   No Growth Final  --  --      .Blood Blood-Peripheral  10-31 @ 10:54   No Growth Final  --  --      Clean Catch Clean Catch (Midstream)  10-28 @ 23:45   <10,000 CFU/mL Normal Urogenital Slime  --  --      .Blood Blood-Peripheral  10-28 @ 21:15   No Growth Final  --  --      .Blood  10-28 @ 21:00   No Growth Final  --  --      .CSF CSF  09-26 @ 14:57   No acid fast bacilli isolated after 6 weeks.  --    No polymorphonuclear cells seen  No organisms seen  by cytocentrifuge      .Blood Blood-Venous  09-16 @ 16:45   No Growth Final  --  --      .Blood Blood  09-16 @ 16:25   No Growth Final  --  --       HPI:     Per chart review,    72 year old male with afib/ aflutter (on eliquis), HTN,  complete heart block s/p PPM, HLD, HFrEF (30% in 09/2022), and DLBCL (tx with R-CHOP in 2003, with relapse in 2009 treated with BR) now with recently diagnosed grade 3 follicular lymphoma last chemo in October p/w cough and SOB. Pt with recent admission to [facility] for pneumonia in November and eventually discharged to rehab. Pt returned home November 26th from rehab somewhat more deconditioned than prior but with no acute complaints. Over the past 2 days pt developed a significant cough associated with ARGUETA and SOB. Patient was admitted to [facility] for COVID pneumonia.      In ER: Given IV Zosyn, asa 324mg, IV vancomycin, , Tylenol 1gm IVPB, Lasix 40mg IV    Patient is found to have COVID infection. CT chest with multifocal consolidation.       Heme history     DLBCL originally diagnosed in 2003 s/p RCHOP with relapse in 2009 s/p BR. 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 Had excisional biopsy but was performed 9/27/22 after starting treatment, with pathology showing DLBCL. Patient is on treatment with a modified regimen of mini-COEP plus Obinutuzumab. C1 Started on 9/1/22 (cycle length q21 days) but full dose obinutuzumab was started 9/2/22. Patient now s/p three cycles, most recently on 10/21/22/-10/24/22, with neulasta. Was due for next cycle (C4) of chemo 11/11/22 however recent admission for encephalopathy with sepsis 2/2 PNA and treatment held. Planned for outpatient PET/CT 12/6 however decompensated with sob/weakness/fatigue and came to the ER.            PAST MEDICAL & SURGICAL HISTORY:  Non-Hodgkins Lymphoma  In [facility] for &gt; 10 years now with relapse      DM type 2 (diabetes mellitus, type 2)  Never on insulin      Essential hypertension      Rhinitis, allergic      Systolic heart failure      Moderate mitral regurgitation      Pleural effusion      Atrial flutter, unspecified type      Impaired memory      Non-Hodgkin lymphoma  h/o axillary dissection      Cardiac pacemaker          Allergies    rasburicase (Other)    Intolerances        MEDICATIONS  (STANDING):  acyclovir   Oral Tab/Cap 400 milliGRAM(s) Oral two times a day  allopurinol 100 milliGRAM(s) Oral daily  apixaban 5 milliGRAM(s) Oral two times a day  atorvastatin 40 milliGRAM(s) Oral at bedtime  chlorhexidine 2% Cloths 1 Application(s) Topical daily  piperacillin/tazobactam IVPB.. 3.375 Gram(s) IV Intermittent every 8 hours  potassium chloride  10 mEq/100 mL IVPB 10 milliEquivalent(s) IV Intermittent every 1 hour  remdesivir  IVPB 200 milliGRAM(s) IV Intermittent every 24 hours  remdesivir  IVPB   IV Intermittent     MEDICATIONS  (PRN):  acetaminophen     Tablet .. 650 milliGRAM(s) Oral every 6 hours PRN Temp greater or equal to 38C (100.4F), Mild Pain (1 - 3)  melatonin 3 milliGRAM(s) Oral at bedtime PRN Insomnia  ondansetron Injectable 4 milliGRAM(s) IV Push every 8 hours PRN Nausea and/or Vomiting      FAMILY HISTORY:  Family history of CHF (congestive heart failure)  Mother    Family history of non-Hodgkin&#x27;s lymphoma (Sibling)        SOCIAL HISTORY: No EtOH, no tobacco    REVIEW OF SYSTEMS:    CONSTITUTIONAL: No weakness, fevers or chills  EYES/ENT: No visual changes;  No vertigo or throat pain   NECK: No pain or stiffness  RESPIRATORY: No cough, wheezing, hemoptysis; No shortness of breath  CARDIOVASCULAR: No chest pain or palpitations  GASTROINTESTINAL: No abdominal or epigastric pain. No nausea, vomiting, or hematemesis; No diarrhea or constipation. No melena or hematochezia.  GENITOURINARY: No dysuria, frequency or hematuria  NEUROLOGICAL: No numbness or weakness  SKIN: No itching, burning, rashes, or lesions   All other review of systems is negative unless indicated above.    Height (cm): 183.5 (12-06 @ 15:12)  Weight (kg): 56.7 (12-06 @ 15:12)  BMI (kg/m2): 16.8 (12-06 @ 15:12)  BSA (m2): 1.75 (12-06 @ 15:12)    T(F): 97.3 (12-07-22 @ 10:47), Max: 102 (12-07-22 @ 01:46)  HR: 85 (12-07-22 @ 10:47)  BP: 108/61 (12-07-22 @ 10:47)  RR: 18 (12-07-22 @ 10:47)  SpO2: 98% (12-07-22 @ 10:47)  Wt(kg): --    GENERAL: NAD, well-developed  HEAD:  Atraumatic, Normocephalic  EYES: EOMI, PERRLA, conjunctiva and sclera clear  NECK: Supple, No JVD  CHEST/LUNG: Clear to auscultation bilaterally; No wheeze  HEART: Regular rate and rhythm; No murmurs, rubs, or gallops  ABDOMEN: Soft, Nontender, Nondistended; Bowel sounds present  EXTREMITIES:  2+ Peripheral Pulses, No clubbing, cyanosis, or edema  NEUROLOGY: non-focal  SKIN: No rashes or lesions                          7.2    1.52  )-----------( 144      ( 07 Dec 2022 07:27 )             23.5       12-07    143  |  103  |  39<H>  ----------------------------<  99  2.6<LL>   |  24  |  1.30    Ca    8.5      07 Dec 2022 07:26  Mg     1.5     12-07    TPro  5.9<L>  /  Alb  3.1<L>  /  TBili  1.0  /  DBili  x   /  AST  30  /  ALT  16  /  AlkPhos  105  12-07      Magnesium, Serum: 1.5 mg/dL (12-07 @ 07:26)      PT/INR - ( 06 Dec 2022 17:05 )   PT: 25.2 sec;   INR: 2.17 ratio         PTT - ( 06 Dec 2022 17:05 )  PTT:37.3 sec    .Blood Blood-Peripheral  10-31 @ 10:58   No Growth Final  --  --      .Blood Blood-Peripheral  10-31 @ 10:54   No Growth Final  --  --      Clean Catch Clean Catch (Midstream)  10-28 @ 23:45   <10,000 CFU/mL Normal Urogenital Slime  --  --      .Blood Blood-Peripheral  10-28 @ 21:15   No Growth Final  --  --      .Blood  10-28 @ 21:00   No Growth Final  --  --      .CSF CSF  09-26 @ 14:57   No acid fast bacilli isolated after 6 weeks.  --    No polymorphonuclear cells seen  No organisms seen  by cytocentrifuge      .Blood Blood-Venous  09-16 @ 16:45   No Growth Final  --  --      .Blood Blood  09-16 @ 16:25   No Growth Final  --  --

## 2022-12-08 LAB
ALBUMIN SERPL ELPH-MCNC: 2.8 G/DL — LOW (ref 3.3–5)
ALP SERPL-CCNC: 110 U/L — SIGNIFICANT CHANGE UP (ref 40–120)
ALT FLD-CCNC: 18 U/L — SIGNIFICANT CHANGE UP (ref 10–45)
ANION GAP SERPL CALC-SCNC: 16 MMOL/L — SIGNIFICANT CHANGE UP (ref 5–17)
AST SERPL-CCNC: 28 U/L — SIGNIFICANT CHANGE UP (ref 10–40)
BASOPHILS # BLD AUTO: 0.01 K/UL — SIGNIFICANT CHANGE UP (ref 0–0.2)
BASOPHILS NFR BLD AUTO: 0.8 % — SIGNIFICANT CHANGE UP (ref 0–2)
BILIRUB SERPL-MCNC: 0.8 MG/DL — SIGNIFICANT CHANGE UP (ref 0.2–1.2)
BUN SERPL-MCNC: 46 MG/DL — HIGH (ref 7–23)
CALCIUM SERPL-MCNC: 8.7 MG/DL — SIGNIFICANT CHANGE UP (ref 8.4–10.5)
CHLORIDE SERPL-SCNC: 104 MMOL/L — SIGNIFICANT CHANGE UP (ref 96–108)
CO2 SERPL-SCNC: 21 MMOL/L — LOW (ref 22–31)
CREAT SERPL-MCNC: 1.44 MG/DL — HIGH (ref 0.5–1.3)
EGFR: 52 ML/MIN/1.73M2 — LOW
EOSINOPHIL # BLD AUTO: 0.07 K/UL — SIGNIFICANT CHANGE UP (ref 0–0.5)
EOSINOPHIL NFR BLD AUTO: 5.1 % — SIGNIFICANT CHANGE UP (ref 0–6)
GLUCOSE SERPL-MCNC: 67 MG/DL — LOW (ref 70–99)
HCT VFR BLD CALC: 27.9 % — LOW (ref 39–50)
HGB BLD-MCNC: 8.4 G/DL — LOW (ref 13–17)
LYMPHOCYTES # BLD AUTO: 0.16 K/UL — LOW (ref 1–3.3)
LYMPHOCYTES # BLD AUTO: 11.1 % — LOW (ref 13–44)
MAGNESIUM SERPL-MCNC: 1.6 MG/DL — SIGNIFICANT CHANGE UP (ref 1.6–2.6)
MCHC RBC-ENTMCNC: 26.6 PG — LOW (ref 27–34)
MCHC RBC-ENTMCNC: 30.1 GM/DL — LOW (ref 32–36)
MCV RBC AUTO: 88.3 FL — SIGNIFICANT CHANGE UP (ref 80–100)
MONOCYTES # BLD AUTO: 0.29 K/UL — SIGNIFICANT CHANGE UP (ref 0–0.9)
MONOCYTES NFR BLD AUTO: 19.7 % — HIGH (ref 2–14)
NEUTROPHILS # BLD AUTO: 0.92 K/UL — LOW (ref 1.8–7.4)
NEUTROPHILS NFR BLD AUTO: 62.4 % — SIGNIFICANT CHANGE UP (ref 43–77)
PHOSPHATE SERPL-MCNC: 2.8 MG/DL — SIGNIFICANT CHANGE UP (ref 2.5–4.5)
PLATELET # BLD AUTO: 160 K/UL — SIGNIFICANT CHANGE UP (ref 150–400)
POTASSIUM SERPL-MCNC: 3.7 MMOL/L — SIGNIFICANT CHANGE UP (ref 3.5–5.3)
POTASSIUM SERPL-SCNC: 3.7 MMOL/L — SIGNIFICANT CHANGE UP (ref 3.5–5.3)
PROT SERPL-MCNC: 6.3 G/DL — SIGNIFICANT CHANGE UP (ref 6–8.3)
RBC # BLD: 3.16 M/UL — LOW (ref 4.2–5.8)
RBC # FLD: 17.2 % — HIGH (ref 10.3–14.5)
SODIUM SERPL-SCNC: 141 MMOL/L — SIGNIFICANT CHANGE UP (ref 135–145)
WBC # BLD: 1.45 K/UL — LOW (ref 3.8–10.5)
WBC # FLD AUTO: 1.45 K/UL — LOW (ref 3.8–10.5)

## 2022-12-08 PROCEDURE — 99233 SBSQ HOSP IP/OBS HIGH 50: CPT

## 2022-12-08 PROCEDURE — 99232 SBSQ HOSP IP/OBS MODERATE 35: CPT

## 2022-12-08 RX ORDER — FILGRASTIM 480MCG/1.6
300 VIAL (ML) INJECTION DAILY
Refills: 0 | Status: DISCONTINUED | OUTPATIENT
Start: 2022-12-08 | End: 2022-12-10

## 2022-12-08 RX ADMIN — Medication 100 MILLIGRAM(S): at 11:50

## 2022-12-08 RX ADMIN — CHLORHEXIDINE GLUCONATE 1 APPLICATION(S): 213 SOLUTION TOPICAL at 11:51

## 2022-12-08 RX ADMIN — Medication 1200 MILLIGRAM(S): at 17:55

## 2022-12-08 RX ADMIN — ALBUTEROL 2 PUFF(S): 90 AEROSOL, METERED ORAL at 05:47

## 2022-12-08 RX ADMIN — APIXABAN 5 MILLIGRAM(S): 2.5 TABLET, FILM COATED ORAL at 17:55

## 2022-12-08 RX ADMIN — ATORVASTATIN CALCIUM 40 MILLIGRAM(S): 80 TABLET, FILM COATED ORAL at 21:55

## 2022-12-08 RX ADMIN — APIXABAN 5 MILLIGRAM(S): 2.5 TABLET, FILM COATED ORAL at 05:43

## 2022-12-08 RX ADMIN — PIPERACILLIN AND TAZOBACTAM 25 GRAM(S): 4; .5 INJECTION, POWDER, LYOPHILIZED, FOR SOLUTION INTRAVENOUS at 21:53

## 2022-12-08 RX ADMIN — Medication 400 MILLIGRAM(S): at 05:43

## 2022-12-08 RX ADMIN — ALBUTEROL 2 PUFF(S): 90 AEROSOL, METERED ORAL at 17:56

## 2022-12-08 RX ADMIN — REMDESIVIR 500 MILLIGRAM(S): 5 INJECTION INTRAVENOUS at 10:02

## 2022-12-08 RX ADMIN — Medication 400 MILLIGRAM(S): at 17:55

## 2022-12-08 RX ADMIN — Medication 1200 MILLIGRAM(S): at 05:43

## 2022-12-08 RX ADMIN — PIPERACILLIN AND TAZOBACTAM 25 GRAM(S): 4; .5 INJECTION, POWDER, LYOPHILIZED, FOR SOLUTION INTRAVENOUS at 15:53

## 2022-12-08 RX ADMIN — PIPERACILLIN AND TAZOBACTAM 25 GRAM(S): 4; .5 INJECTION, POWDER, LYOPHILIZED, FOR SOLUTION INTRAVENOUS at 05:43

## 2022-12-08 RX ADMIN — Medication 300 MICROGRAM(S): at 15:31

## 2022-12-08 NOTE — PROGRESS NOTE ADULT - SUBJECTIVE AND OBJECTIVE BOX
Andre Reyes, M.D.  Pager: [phone]  Office: [phone]    Patient is a 72y old  Male who presents with a chief complaint of SOB, Cough and sepsis (07 Dec 2022 14:35)          SUBJECTIVE / OVERNIGHT EVENTS:    No acute overnight events.    ROS: ( - ) Fever, ( - )Chills,  ( - )Nausea/Vomiting, ( - ) Cough, ( - )Shortness of breath, ( - )Chest Pain    MEDICATIONS  (STANDING):  acyclovir   Oral Tab/Cap 400 milliGRAM(s) Oral two times a day  albuterol    90 MICROgram(s) HFA Inhaler 2 Puff(s) Inhalation two times a day  allopurinol 100 milliGRAM(s) Oral daily  apixaban 5 milliGRAM(s) Oral two times a day  atorvastatin 40 milliGRAM(s) Oral at bedtime  chlorhexidine 2% Cloths 1 Application(s) Topical daily  filgrastim-sndz (ZARXIO) Injectable 300 MICROGram(s) SubCutaneous daily  guaiFENesin ER 1200 milliGRAM(s) Oral every 12 hours  piperacillin/tazobactam IVPB.. 3.375 Gram(s) IV Intermittent every 8 hours  remdesivir  IVPB 100 milliGRAM(s) IV Intermittent every 24 hours  remdesivir  IVPB   IV Intermittent     MEDICATIONS  (PRN):  acetaminophen     Tablet .. 650 milliGRAM(s) Oral every 6 hours PRN Temp greater or equal to 38C (100.4F), Mild Pain (1 - 3)  melatonin 3 milliGRAM(s) Oral at bedtime PRN Insomnia  ondansetron Injectable 4 milliGRAM(s) IV Push every 8 hours PRN Nausea and/or Vomiting          T(C): 36.3 (12-08 @ 14:58), Max: 37.4 (12-08 @ 05:36)   HR: 92   BP: 105/65   RR: 18   SpO2: 96%    PHYSICAL EXAM:    CONSTITUTIONAL: NAD, well-developed, well-groomed  EYES: PERRLA; conjunctiva and sclera clear  ENMT: Moist oral mucosa, no pharyngeal injection or exudates; normal dentition  NECK: Supple, no palpable masses; no thyromegaly  RESPIRATORY: Normal respiratory effort; lungs are clear to auscultation bilaterally  CARDIOVASCULAR: Regular rate and rhythm, normal S1 and S2, no murmur/rub/gallop; No lower extremity edema; Peripheral pulses are 2+ bilaterally  ABDOMEN: Nontender to palpation, normoactive bowel sounds, no rebound/guarding; No hepatosplenomegaly  MUSCULOSKELETAL:  no clubbing or cyanosis of digits; no joint swelling or tenderness to palpation  PSYCH: A+O to person, place, and time; affect appropriate  NEUROLOGY: CN 2-12 are intact and symmetric; no gross sensory deficits   SKIN: No rashes; no palpable lesions      LABS:                        8.4    1.45  )-----------( 160      ( 08 Dec 2022 07:08 )             27.9      12-08    141  |  104  |  46<H>  ----------------------------<  67<L>  3.7   |  21<L>  |  1.44<H>    Ca    8.7      08 Dec 2022 07:08  Phos  2.8     12-08  Mg     1.6     12-08    TPro  6.3  /  Alb  2.8<L>  /  TBili  0.8  /  DBili  x   /  AST  28  /  ALT  18  /  AlkPhos  110  12-08       CAPILLARY BLOOD GLUCOSE          RADIOLOGY & ADDITIONAL TESTS:    Imaging Personally Reviewed:  Consultant(s) Notes Reviewed:    Care Discussed with Consultants/Other Providers:   Andre Reyes, M.D.  Pager: [phone]  Office: [phone]    Patient is a 72y old  Male who presents with a chief complaint of SOB, Cough and sepsis (07 Dec 2022 14:35)          SUBJECTIVE / OVERNIGHT EVENTS:    No acute overnight events.  No new complaints.   still with some shortness of breath  still with a non-productive cough    ROS: ( - ) Fever, ( - )Chills,  ( - )Nausea/Vomiting, ( + ) Cough, ( + )Shortness of breath, ( - )Chest Pain    MEDICATIONS  (STANDING):  acyclovir   Oral Tab/Cap 400 milliGRAM(s) Oral two times a day  albuterol    90 MICROgram(s) HFA Inhaler 2 Puff(s) Inhalation two times a day  allopurinol 100 milliGRAM(s) Oral daily  apixaban 5 milliGRAM(s) Oral two times a day  atorvastatin 40 milliGRAM(s) Oral at bedtime  chlorhexidine 2% Cloths 1 Application(s) Topical daily  filgrastim-sndz (ZARXIO) Injectable 300 MICROGram(s) SubCutaneous daily  guaiFENesin ER 1200 milliGRAM(s) Oral every 12 hours  piperacillin/tazobactam IVPB.. 3.375 Gram(s) IV Intermittent every 8 hours  remdesivir  IVPB 100 milliGRAM(s) IV Intermittent every 24 hours  remdesivir  IVPB   IV Intermittent     MEDICATIONS  (PRN):  acetaminophen     Tablet .. 650 milliGRAM(s) Oral every 6 hours PRN Temp greater or equal to 38C (100.4F), Mild Pain (1 - 3)  melatonin 3 milliGRAM(s) Oral at bedtime PRN Insomnia  ondansetron Injectable 4 milliGRAM(s) IV Push every 8 hours PRN Nausea and/or Vomiting          T(C): 36.3 (12-08 @ 14:58), Max: 37.4 (12-08 @ 05:36)   HR: 92   BP: 105/65   RR: 18   SpO2: 96%    PHYSICAL EXAM:    CONSTITUTIONAL: NAD, appears weak  EYES: PERRLA; conjunctiva and sclera clear  ENMT: Moist oral mucosa, no pharyngeal injection or exudates; normal dentition  RESPIRATORY: Normal respiratory effort; lungs are clear to auscultation bilaterally  CARDIOVASCULAR: Regular rate and rhythm, normal S1 and S2, no murmur/rub/gallop; No lower extremity edema; Peripheral pulses are 2+ bilaterally  ABDOMEN: Nontender to palpation, normoactive bowel sounds, no rebound/guarding; No hepatosplenomegaly  MUSCULOSKELETAL:  no clubbing or cyanosis of digits; no joint swelling or tenderness to palpation  PSYCH: A+O to person, place, and time; affect appropriate  NEUROLOGY: CN 2-12 are intact and symmetric; no gross sensory deficits   SKIN: No rashes; no palpable lesions      LABS:                        8.4    1.45  )-----------( 160      ( 08 Dec 2022 07:08 )             27.9      12-08    141  |  104  |  46<H>  ----------------------------<  67<L>  3.7   |  21<L>  |  1.44<H>    Ca    8.7      08 Dec 2022 07:08  Phos  2.8     12-08  Mg     1.6     12-08    TPro  6.3  /  Alb  2.8<L>  /  TBili  0.8  /  DBili  x   /  AST  28  /  ALT  18  /  AlkPhos  110  12-08       CAPILLARY BLOOD GLUCOSE          RADIOLOGY & ADDITIONAL TESTS:    Imaging Personally Reviewed:  Consultant(s) Notes Reviewed:    Care Discussed with Consultants/Other Providers:

## 2022-12-08 NOTE — PROGRESS NOTE ADULT - SUBJECTIVE AND OBJECTIVE BOX
CC: F/U for COVID    Saw/spoke to patient. No fevers, no chills. No new complaints.    Allergies  rasburicase (Other)    ANTIMICROBIALS:  acyclovir   Oral Tab/Cap 400 two times a day  piperacillin/tazobactam IVPB.. 3.375 every 8 hours  remdesivir  IVPB 100 every 24 hours  remdesivir  IVPB      PE:    Vital Signs Last 24 Hrs  T(C): 36.3 (08 Dec 2022 14:58), Max: 37.4 (08 Dec 2022 05:36)  T(F): 97.3 (08 Dec 2022 14:58), Max: 99.3 (08 Dec 2022 05:36)  HR: 92 (08 Dec 2022 14:58) (81 - 105)  BP: 105/65 (08 Dec 2022 14:58) (105/65 - 118/60)  RR: 18 (08 Dec 2022 14:58) (17 - 18)  SpO2: 96% (08 Dec 2022 14:58) (93% - 98%)    Gen: AOx3, NAD, non-toxic  CV: Nontachycardic  Resp: Breathing comfortably, RA  Abd: Soft, nontender  IV/Skin: No thrombophlebitis    LABS:                        8.4    1.45  )-----------( 160      ( 08 Dec 2022 07:08 )             27.9     12-08    141  |  104  |  46<H>  ----------------------------<  67<L>  3.7   |  21<L>  |  1.44<H>    Ca    8.7      08 Dec 2022 07:08  Phos  2.8     12-08  Mg     1.6     12-08    TPro  6.3  /  Alb  2.8<L>  /  TBili  0.8  /  DBili  x   /  AST  28  /  ALT  18  /  AlkPhos  110  12-08    MICROBIOLOGY:    .Blood Blood-Peripheral  12-06-22   No growth to date.  --  --    .Blood Blood-Peripheral  12-06-22   No growth to date.  --  --    Rapid RVP Result: Detected (12-06 @ 17:04)    RADIOLOGY:    12/6 CT:    IMPRESSION:  No pulmonary embolism.    Redemonstration of multiple consolidative opacities throughout the lungs,   some of which are decreased and some of which are increased/new.    A loculated effusion involving the minor fissure is decreased in size.

## 2022-12-09 ENCOUNTER — APPOINTMENT (OUTPATIENT)
Dept: INFUSION THERAPY | Facility: HOSPITAL | Age: 72
End: 2022-12-09

## 2022-12-09 LAB
ANION GAP SERPL CALC-SCNC: 14 MMOL/L — SIGNIFICANT CHANGE UP (ref 5–17)
BASOPHILS # BLD AUTO: 0 K/UL — SIGNIFICANT CHANGE UP (ref 0–0.2)
BASOPHILS NFR BLD AUTO: 0 % — SIGNIFICANT CHANGE UP (ref 0–2)
BUN SERPL-MCNC: 41 MG/DL — HIGH (ref 7–23)
CALCIUM SERPL-MCNC: 8.6 MG/DL — SIGNIFICANT CHANGE UP (ref 8.4–10.5)
CHLORIDE SERPL-SCNC: 105 MMOL/L — SIGNIFICANT CHANGE UP (ref 96–108)
CO2 SERPL-SCNC: 22 MMOL/L — SIGNIFICANT CHANGE UP (ref 22–31)
CREAT SERPL-MCNC: 1.33 MG/DL — HIGH (ref 0.5–1.3)
EGFR: 57 ML/MIN/1.73M2 — LOW
EOSINOPHIL # BLD AUTO: 0.12 K/UL — SIGNIFICANT CHANGE UP (ref 0–0.5)
EOSINOPHIL NFR BLD AUTO: 4.9 % — SIGNIFICANT CHANGE UP (ref 0–6)
GLUCOSE SERPL-MCNC: 72 MG/DL — SIGNIFICANT CHANGE UP (ref 70–99)
HCT VFR BLD CALC: 26.9 % — LOW (ref 39–50)
HGB BLD-MCNC: 7.7 G/DL — LOW (ref 13–17)
LYMPHOCYTES # BLD AUTO: 0.21 K/UL — LOW (ref 1–3.3)
LYMPHOCYTES # BLD AUTO: 8.2 % — LOW (ref 13–44)
MANUAL SMEAR VERIFICATION: SIGNIFICANT CHANGE UP
MCHC RBC-ENTMCNC: 26 PG — LOW (ref 27–34)
MCHC RBC-ENTMCNC: 28.6 GM/DL — LOW (ref 32–36)
MCV RBC AUTO: 90.9 FL — SIGNIFICANT CHANGE UP (ref 80–100)
MONOCYTES # BLD AUTO: 0.17 K/UL — SIGNIFICANT CHANGE UP (ref 0–0.9)
MONOCYTES NFR BLD AUTO: 6.6 % — SIGNIFICANT CHANGE UP (ref 2–14)
NEUTROPHILS # BLD AUTO: 2.02 K/UL — SIGNIFICANT CHANGE UP (ref 1.8–7.4)
NEUTROPHILS NFR BLD AUTO: 77 % — SIGNIFICANT CHANGE UP (ref 43–77)
NEUTS BAND # BLD: 3.3 % — SIGNIFICANT CHANGE UP (ref 0–8)
NRBC # BLD: 0 /100 WBCS — SIGNIFICANT CHANGE UP (ref 0–0)
PLAT MORPH BLD: NORMAL — SIGNIFICANT CHANGE UP
PLATELET # BLD AUTO: 161 K/UL — SIGNIFICANT CHANGE UP (ref 150–400)
POTASSIUM SERPL-MCNC: 3.5 MMOL/L — SIGNIFICANT CHANGE UP (ref 3.5–5.3)
POTASSIUM SERPL-SCNC: 3.5 MMOL/L — SIGNIFICANT CHANGE UP (ref 3.5–5.3)
RBC # BLD: 2.96 M/UL — LOW (ref 4.2–5.8)
RBC # FLD: 17.3 % — HIGH (ref 10.3–14.5)
RBC BLD AUTO: SIGNIFICANT CHANGE UP
SODIUM SERPL-SCNC: 141 MMOL/L — SIGNIFICANT CHANGE UP (ref 135–145)
WBC # BLD: 2.52 K/UL — LOW (ref 3.8–10.5)
WBC # FLD AUTO: 2.52 K/UL — LOW (ref 3.8–10.5)

## 2022-12-09 PROCEDURE — 99232 SBSQ HOSP IP/OBS MODERATE 35: CPT

## 2022-12-09 RX ADMIN — APIXABAN 5 MILLIGRAM(S): 2.5 TABLET, FILM COATED ORAL at 05:58

## 2022-12-09 RX ADMIN — Medication 100 MILLIGRAM(S): at 11:26

## 2022-12-09 RX ADMIN — PIPERACILLIN AND TAZOBACTAM 25 GRAM(S): 4; .5 INJECTION, POWDER, LYOPHILIZED, FOR SOLUTION INTRAVENOUS at 22:04

## 2022-12-09 RX ADMIN — ALBUTEROL 2 PUFF(S): 90 AEROSOL, METERED ORAL at 17:40

## 2022-12-09 RX ADMIN — Medication 400 MILLIGRAM(S): at 05:58

## 2022-12-09 RX ADMIN — Medication 400 MILLIGRAM(S): at 17:40

## 2022-12-09 RX ADMIN — Medication 1200 MILLIGRAM(S): at 17:40

## 2022-12-09 RX ADMIN — CHLORHEXIDINE GLUCONATE 1 APPLICATION(S): 213 SOLUTION TOPICAL at 11:30

## 2022-12-09 RX ADMIN — ALBUTEROL 2 PUFF(S): 90 AEROSOL, METERED ORAL at 06:02

## 2022-12-09 RX ADMIN — PIPERACILLIN AND TAZOBACTAM 25 GRAM(S): 4; .5 INJECTION, POWDER, LYOPHILIZED, FOR SOLUTION INTRAVENOUS at 05:58

## 2022-12-09 RX ADMIN — REMDESIVIR 500 MILLIGRAM(S): 5 INJECTION INTRAVENOUS at 10:09

## 2022-12-09 RX ADMIN — APIXABAN 5 MILLIGRAM(S): 2.5 TABLET, FILM COATED ORAL at 17:40

## 2022-12-09 RX ADMIN — ATORVASTATIN CALCIUM 40 MILLIGRAM(S): 80 TABLET, FILM COATED ORAL at 22:04

## 2022-12-09 RX ADMIN — Medication 1200 MILLIGRAM(S): at 05:59

## 2022-12-09 RX ADMIN — PIPERACILLIN AND TAZOBACTAM 25 GRAM(S): 4; .5 INJECTION, POWDER, LYOPHILIZED, FOR SOLUTION INTRAVENOUS at 14:49

## 2022-12-09 RX ADMIN — Medication 300 MICROGRAM(S): at 12:33

## 2022-12-09 NOTE — DIETITIAN INITIAL EVALUATION ADULT - REASON FOR ADMISSION
Per chart, Pt is a 73 y/o M with PMH: "afib/ aflutter (on eliquis), HTN,  complete heart block s/p PPM, HLD, HFrEF (30% in 09/2022), and DLBCL (tx with R-CHOP in 2003, with relapse in 2009 treated with BR) now with recently diagnosed grade 3 follicular lymphoma last chemo in October p/w cough and SOB found to have sepsis 2/2 COVID-19 infection and possibly bacterial PNA." Started on IV RDV and IV Abx.

## 2022-12-09 NOTE — DIETITIAN INITIAL EVALUATION ADULT - PERTINENT LABORATORY DATA
12-09    141  |  105  |  41<H>  ----------------------------<  72  3.5   |  22  |  1.33<H>    Ca    8.6      09 Dec 2022 07:20  Phos  2.8     12-08  Mg     1.6     12-08    TPro  6.3  /  Alb  2.8<L>  /  TBili  0.8  /  DBili  x   /  AST  28  /  ALT  18  /  AlkPhos  110  12-08  A1C with Estimated Average Glucose Result: 6.2 % (10-29-22 @ 05:59)  A1C with Estimated Average Glucose Result: 5.4 % (08-29-22 @ 05:55)

## 2022-12-09 NOTE — PHARMACOTHERAPY INTERVENTION NOTE - COMMENTS
Medication reconciliation completed. Please refer to specifics in home medication list (outpatient medication review). Medications verified with patient's daughter.    -------------------------------------------------------------------------------------------  Home Medications:  acyclovir 400 mg oral tablet: 1 tab(s) orally 2 times a day   apixaban 5 mg oral tablet: 1 tab(s) orally every 12 hours   atorvastatin 40 mg oral tablet: 1 tab(s) orally once a day at bedtime  allopurinol 100 mg oral tablet: 1 tab(s) orally once a day      Added:    Changed:    Removed:  -------------------------------------------------------------------------------------------    Recommendations:    Time spent:  15 minutes    Cameron Borden, PharmD Candidate  Dolores Reed, TammyD, BCPS  [phone]  Available on [employer] Teams

## 2022-12-09 NOTE — DIETITIAN INITIAL EVALUATION ADULT - PERTINENT MEDS FT
MEDICATIONS  (STANDING):  acyclovir   Oral Tab/Cap 400 milliGRAM(s) Oral two times a day  albuterol    90 MICROgram(s) HFA Inhaler 2 Puff(s) Inhalation two times a day  allopurinol 100 milliGRAM(s) Oral daily  apixaban 5 milliGRAM(s) Oral two times a day  atorvastatin 40 milliGRAM(s) Oral at bedtime  chlorhexidine 2% Cloths 1 Application(s) Topical daily  filgrastim-sndz (ZARXIO) Injectable 300 MICROGram(s) SubCutaneous daily  guaiFENesin ER 1200 milliGRAM(s) Oral every 12 hours  piperacillin/tazobactam IVPB.. 3.375 Gram(s) IV Intermittent every 8 hours  remdesivir  IVPB 100 milliGRAM(s) IV Intermittent every 24 hours  remdesivir  IVPB   IV Intermittent     MEDICATIONS  (PRN):  acetaminophen     Tablet .. 650 milliGRAM(s) Oral every 6 hours PRN Temp greater or equal to 38C (100.4F), Mild Pain (1 - 3)  melatonin 3 milliGRAM(s) Oral at bedtime PRN Insomnia  ondansetron Injectable 4 milliGRAM(s) IV Push every 8 hours PRN Nausea and/or Vomiting

## 2022-12-09 NOTE — PROVIDER CONTACT NOTE (FALL NOTIFICATION) - BACKGROUND
Patient has sepsis 2/2 pneumonia, impaired memory, diabetic, hypertension, non-hodgkins lymphoma, heart failure, pleural effusion.

## 2022-12-09 NOTE — DIETITIAN INITIAL EVALUATION ADULT - NS FNS WEIGHT CHANGE REASON
also noted with significant wt loss x 1 month and 3 months: 66.2 kg to 56.7 kg = 14.3%/unintentional

## 2022-12-09 NOTE — DIETITIAN INITIAL EVALUATION ADULT - ENERGY INTAKE
Poor (<50%) Currently reports poor appetite. Consumed only a few bites of breakfast today. RN flowsheets report Pt consuming 0-25% of meals. Pt declined offer for Ensure (milk-based) supplements, but willing to try Ensure Clear supplement.

## 2022-12-09 NOTE — DIETITIAN INITIAL EVALUATION ADULT - NSICDXPASTMEDICALHX_GEN_ALL_CORE_FT
PAST MEDICAL HISTORY:  Atrial flutter, unspecified type     DM type 2 (diabetes mellitus, type 2) Never on insulin    Essential hypertension     Impaired memory     Moderate mitral regurgitation     Non-Hodgkins Lymphoma In [facility] for > 10 years now with relapse    Pleural effusion     Rhinitis, allergic     Systolic heart failure

## 2022-12-09 NOTE — DIETITIAN INITIAL EVALUATION ADULT - ADD RECOMMEND
1) Recommend easy to chew, regular diet and Ensure Clear 2x/day (240 kcal, 8 g Pro/8oz)  2) SLP eval if concern for swallowing difficulty and defer consistency to SLP  3) Recommend MVI daily to optimize nutrition status  4) Consider appetite stimulant if PO intake does not improve/medically appropriate  5) Daily wts  6) Malnutrition sticker placed, RD to follow-up as per protocol   7) Monitor PO intake, weight, labs, skin, GI status, diet

## 2022-12-09 NOTE — PROGRESS NOTE ADULT - SUBJECTIVE AND OBJECTIVE BOX
Andre Reyes, M.D.  Pager: [phone]  Office: [phone]    Patient is a 72y old  Male who presents with a chief complaint of Per chart, Pt is a 73 y/o M with PMH: "afib/ aflutter (on eliquis), HTN,  complete heart block s/p PPM, HLD, HFrEF (30% in 09/2022), and DLBCL (tx with R-CHOP in 2003, with relapse in 2009 treated with BR) now with recently diagnosed grade 3 follicular lymphoma last chemo in October p/w cough and SOB found to have sepsis 2/2 COVID-19 infection and possibly bacterial PNA." Started on IV RDV and IV Abx.     (09 Dec 2022 11:49)          SUBJECTIVE / OVERNIGHT EVENTS:    No acute overnight events.  feels the same  ROS: ( - ) Fever, ( - )Chills,  ( - )Nausea/Vomiting, ( + ) Cough, ( + )Shortness of breath, ( - )Chest Pain    MEDICATIONS  (STANDING):  acyclovir   Oral Tab/Cap 400 milliGRAM(s) Oral two times a day  albuterol    90 MICROgram(s) HFA Inhaler 2 Puff(s) Inhalation two times a day  allopurinol 100 milliGRAM(s) Oral daily  apixaban 5 milliGRAM(s) Oral two times a day  atorvastatin 40 milliGRAM(s) Oral at bedtime  chlorhexidine 2% Cloths 1 Application(s) Topical daily  filgrastim-sndz (ZARXIO) Injectable 300 MICROGram(s) SubCutaneous daily  guaiFENesin ER 1200 milliGRAM(s) Oral every 12 hours  multivitamin 1 Tablet(s) Oral daily  piperacillin/tazobactam IVPB.. 3.375 Gram(s) IV Intermittent every 8 hours  remdesivir  IVPB 100 milliGRAM(s) IV Intermittent every 24 hours  remdesivir  IVPB   IV Intermittent     MEDICATIONS  (PRN):  acetaminophen     Tablet .. 650 milliGRAM(s) Oral every 6 hours PRN Temp greater or equal to 38C (100.4F), Mild Pain (1 - 3)  melatonin 3 milliGRAM(s) Oral at bedtime PRN Insomnia  ondansetron Injectable 4 milliGRAM(s) IV Push every 8 hours PRN Nausea and/or Vomiting          T(C): 36.7 (12-09 @ 21:13), Max: 36.8 (12-09 @ 11:14)   HR: 94   BP: 122/69   RR: 17   SpO2: 96%    PHYSICAL EXAM:    CONSTITUTIONAL: NAD, well-developed, well-groomed  EYES: PERRLA; conjunctiva and sclera clear  ENMT: Moist oral mucosa, no pharyngeal injection or exudates; normal dentition  NECK: Supple, no palpable masses; no thyromegaly  RESPIRATORY: Normal respiratory effort; lungs are clear to auscultation bilaterally  CARDIOVASCULAR: Regular rate and rhythm, normal S1 and S2, no murmur/rub/gallop; No lower extremity edema; Peripheral pulses are 2+ bilaterally  ABDOMEN: Nontender to palpation, normoactive bowel sounds, no rebound/guarding; No hepatosplenomegaly  MUSCULOSKELETAL:  no clubbing or cyanosis of digits; no joint swelling or tenderness to palpation  PSYCH: A+O to person, place, and time; affect appropriate  NEUROLOGY: CN 2-12 are intact and symmetric; no gross sensory deficits   SKIN: No rashes; no palpable lesions      LABS:                        7.7    2.52  )-----------( 161      ( 09 Dec 2022 07:20 )             26.9      12-09    141  |  105  |  41<H>  ----------------------------<  72  3.5   |  22  |  1.33<H>    Ca    8.6      09 Dec 2022 07:20  Phos  2.8     12-08  Mg     1.6     12-08    TPro  6.3  /  Alb  2.8<L>  /  TBili  0.8  /  DBili  x   /  AST  28  /  ALT  18  /  AlkPhos  110  12-08       CAPILLARY BLOOD GLUCOSE          RADIOLOGY & ADDITIONAL TESTS:    Imaging Personally Reviewed:  Consultant(s) Notes Reviewed:    Care Discussed with Consultants/Other Providers:

## 2022-12-09 NOTE — DIETITIAN INITIAL EVALUATION ADULT - SIGNS/SYMPTOMS
<75% PO x >1 month, mild-mod muscle loss, mild fat loss, >20% wt loss x 1 yr, >5% in <1 and 3 months as evidenced by increased needs as above.

## 2022-12-09 NOTE — PROGRESS NOTE ADULT - SUBJECTIVE AND OBJECTIVE BOX
CC: F/U for COVID    Saw/spoke to patient. No fevers, no chills. No new complaints.    Allergies  rasburicase (Other)    ANTIMICROBIALS:  acyclovir   Oral Tab/Cap 400 two times a day  piperacillin/tazobactam IVPB.. 3.375 every 8 hours  remdesivir  IVPB 100 every 24 hours  remdesivir  IVPB      PE:    Vital Signs Last 24 Hrs  T(C): 36.8 (09 Dec 2022 11:14), Max: 37.4 (08 Dec 2022 20:34)  T(F): 98.3 (09 Dec 2022 11:14), Max: 99.3 (08 Dec 2022 20:34)  HR: 99 (09 Dec 2022 11:14) (62 - 99)  BP: 138/70 (09 Dec 2022 11:14) (125/65 - 148/-)  BP(mean): 83 (08 Dec 2022 20:34) (83 - 83)  RR: 18 (09 Dec 2022 11:14) (18 - 18)  SpO2: 94% (09 Dec 2022 11:14) (93% - 98%)    Gen: AOx3, NAD, non-toxic  CV: Nontachycardic  Resp: Breathing comfortably, RA  Abd: Soft, nontender  IV/Skin: No thrombophlebitis    LABS:                        7.7    2.52  )-----------( 161      ( 09 Dec 2022 07:20 )             26.9     12-09    141  |  105  |  41<H>  ----------------------------<  72  3.5   |  22  |  1.33<H>    Ca    8.6      09 Dec 2022 07:20  Phos  2.8     12-08  Mg     1.6     12-08    TPro  6.3  /  Alb  2.8<L>  /  TBili  0.8  /  DBili  x   /  AST  28  /  ALT  18  /  AlkPhos  110  12-08    MICROBIOLOGY:    .Blood Blood-Peripheral  12-06-22   No growth to date.  --  --    .Blood Blood-Peripheral  12-06-22   No growth to date.  --  --    Rapid RVP Result: Detected (12-06 @ 17:04)    (otherwise reviewed)    RADIOLOGY:    12/6 CT:    IMPRESSION:  No pulmonary embolism.    Redemonstration of multiple consolidative opacities throughout the lungs,   some of which are decreased and some of which are increased/new.    A loculated effusion involving the minor fissure is decreased in size.

## 2022-12-09 NOTE — DIETITIAN INITIAL EVALUATION ADULT - OTHER INFO
Current Dosing Wt: 56.7 kg (12/6)  Wt Hx per chart review: 66.2 kg (11/2), 66.1 kg (9/28), 76.9 kg (8/29), 87.3 kg (10/18/21)

## 2022-12-09 NOTE — DIETITIAN INITIAL EVALUATION ADULT - ORAL INTAKE PTA/DIET HISTORY
Chart reviewed, events noted. Visited Pt at bedside. Reports fair appetite for "a long time." Eating small meals at home, did not wish to elaborate. Denies any issues chewing/swallowing. NKFA. Per RD note from 11/2/22, Pt's daughter reported Pt eating 2-3 meals/day and Ensure Enlive 2x/day and endorsed significant wt loss x 1 year from 170-180 lb to 145 lb.

## 2022-12-09 NOTE — PROVIDER CONTACT NOTE (FALL NOTIFICATION) - ASSESSMENT
Pt a+ox3, forgetful, no s/s of pain or discomfort, no acute distress noted. Patient states." I was trying to just get out of bed and went down." He denies hitting his head. Patient neuro status is stable and vital signs are WNL. No visible signs of injury noted.

## 2022-12-09 NOTE — DIETITIAN INITIAL EVALUATION ADULT - NSFNSADHERENCEPTAFT_GEN_A_CORE
Does not follow any diet restrictions PTA. Last HbA1c 6.2% (11/29) indicates adequate glycemic control for age. Per previous RD note 11/2/22, Pt not on any DM medications and does not check blood sugars.

## 2022-12-09 NOTE — DIETITIAN INITIAL EVALUATION ADULT - NSFNSNUTRCHEWSWALLOWFT_GEN_A_CORE
Currently denies any issues chewing/swallowing. On previous admission 11/2022, Pt ordered for soft/bite sized diet with mildly thickened liquids. Pt denies need for modified diet but amenable to trial of easy to chew diet to ease tolerance.

## 2022-12-09 NOTE — DIETITIAN INITIAL EVALUATION ADULT - PERSON TAUGHT/METHOD
importance of adequate energy and protein intake; small frequent meals; oral nutrition supplements/verbal instruction/patient instructed

## 2022-12-10 ENCOUNTER — NON-APPOINTMENT (OUTPATIENT)
Age: 72
End: 2022-12-10

## 2022-12-10 ENCOUNTER — APPOINTMENT (OUTPATIENT)
Dept: INFUSION THERAPY | Facility: HOSPITAL | Age: 72
End: 2022-12-10

## 2022-12-10 LAB
ANION GAP SERPL CALC-SCNC: 12 MMOL/L — SIGNIFICANT CHANGE UP (ref 5–17)
ANION GAP SERPL CALC-SCNC: 13 MMOL/L — SIGNIFICANT CHANGE UP (ref 5–17)
ANION GAP SERPL CALC-SCNC: 14 MMOL/L — SIGNIFICANT CHANGE UP (ref 5–17)
BASOPHILS # BLD AUTO: 0 K/UL — SIGNIFICANT CHANGE UP (ref 0–0.2)
BASOPHILS NFR BLD AUTO: 0 % — SIGNIFICANT CHANGE UP (ref 0–2)
BUN SERPL-MCNC: 26 MG/DL — HIGH (ref 7–23)
BUN SERPL-MCNC: 27 MG/DL — HIGH (ref 7–23)
BUN SERPL-MCNC: 29 MG/DL — HIGH (ref 7–23)
CALCIUM SERPL-MCNC: 8.5 MG/DL — SIGNIFICANT CHANGE UP (ref 8.4–10.5)
CALCIUM SERPL-MCNC: 8.8 MG/DL — SIGNIFICANT CHANGE UP (ref 8.4–10.5)
CALCIUM SERPL-MCNC: 8.8 MG/DL — SIGNIFICANT CHANGE UP (ref 8.4–10.5)
CHLORIDE SERPL-SCNC: 105 MMOL/L — SIGNIFICANT CHANGE UP (ref 96–108)
CHLORIDE SERPL-SCNC: 106 MMOL/L — SIGNIFICANT CHANGE UP (ref 96–108)
CHLORIDE SERPL-SCNC: 107 MMOL/L — SIGNIFICANT CHANGE UP (ref 96–108)
CO2 SERPL-SCNC: 23 MMOL/L — SIGNIFICANT CHANGE UP (ref 22–31)
CO2 SERPL-SCNC: 23 MMOL/L — SIGNIFICANT CHANGE UP (ref 22–31)
CO2 SERPL-SCNC: 26 MMOL/L — SIGNIFICANT CHANGE UP (ref 22–31)
CREAT SERPL-MCNC: 1.12 MG/DL — SIGNIFICANT CHANGE UP (ref 0.5–1.3)
CREAT SERPL-MCNC: 1.15 MG/DL — SIGNIFICANT CHANGE UP (ref 0.5–1.3)
CREAT SERPL-MCNC: 1.22 MG/DL — SIGNIFICANT CHANGE UP (ref 0.5–1.3)
EGFR: 63 ML/MIN/1.73M2 — SIGNIFICANT CHANGE UP
EGFR: 68 ML/MIN/1.73M2 — SIGNIFICANT CHANGE UP
EGFR: 70 ML/MIN/1.73M2 — SIGNIFICANT CHANGE UP
EOSINOPHIL # BLD AUTO: 0 K/UL — SIGNIFICANT CHANGE UP (ref 0–0.5)
EOSINOPHIL NFR BLD AUTO: 0 % — SIGNIFICANT CHANGE UP (ref 0–6)
GIANT PLATELETS BLD QL SMEAR: PRESENT — SIGNIFICANT CHANGE UP
GLUCOSE SERPL-MCNC: 103 MG/DL — HIGH (ref 70–99)
GLUCOSE SERPL-MCNC: 84 MG/DL — SIGNIFICANT CHANGE UP (ref 70–99)
GLUCOSE SERPL-MCNC: 93 MG/DL — SIGNIFICANT CHANGE UP (ref 70–99)
HCT VFR BLD CALC: 25.5 % — LOW (ref 39–50)
HGB BLD-MCNC: 7.5 G/DL — LOW (ref 13–17)
LYMPHOCYTES # BLD AUTO: 0.21 K/UL — LOW (ref 1–3.3)
LYMPHOCYTES # BLD AUTO: 8 % — LOW (ref 13–44)
MANUAL SMEAR VERIFICATION: SIGNIFICANT CHANGE UP
MCHC RBC-ENTMCNC: 26.1 PG — LOW (ref 27–34)
MCHC RBC-ENTMCNC: 29.4 GM/DL — LOW (ref 32–36)
MCV RBC AUTO: 88.9 FL — SIGNIFICANT CHANGE UP (ref 80–100)
METAMYELOCYTES # FLD: 0.9 % — HIGH (ref 0–0)
MONOCYTES # BLD AUTO: 0.31 K/UL — SIGNIFICANT CHANGE UP (ref 0–0.9)
MONOCYTES NFR BLD AUTO: 11.6 % — SIGNIFICANT CHANGE UP (ref 2–14)
NEUTROPHILS # BLD AUTO: 2.09 K/UL — SIGNIFICANT CHANGE UP (ref 1.8–7.4)
NEUTROPHILS NFR BLD AUTO: 69.7 % — SIGNIFICANT CHANGE UP (ref 43–77)
NEUTS BAND # BLD: 9.8 % — HIGH (ref 0–8)
NRBC # BLD: 0 /100 WBCS — SIGNIFICANT CHANGE UP (ref 0–0)
PLAT MORPH BLD: NORMAL — SIGNIFICANT CHANGE UP
PLATELET # BLD AUTO: 163 K/UL — SIGNIFICANT CHANGE UP (ref 150–400)
POTASSIUM SERPL-MCNC: 2.9 MMOL/L — CRITICAL LOW (ref 3.5–5.3)
POTASSIUM SERPL-MCNC: 2.9 MMOL/L — CRITICAL LOW (ref 3.5–5.3)
POTASSIUM SERPL-MCNC: 3.5 MMOL/L — SIGNIFICANT CHANGE UP (ref 3.5–5.3)
POTASSIUM SERPL-SCNC: 2.9 MMOL/L — CRITICAL LOW (ref 3.5–5.3)
POTASSIUM SERPL-SCNC: 2.9 MMOL/L — CRITICAL LOW (ref 3.5–5.3)
POTASSIUM SERPL-SCNC: 3.5 MMOL/L — SIGNIFICANT CHANGE UP (ref 3.5–5.3)
RBC # BLD: 2.87 M/UL — LOW (ref 4.2–5.8)
RBC # FLD: 17.2 % — HIGH (ref 10.3–14.5)
RBC BLD AUTO: SIGNIFICANT CHANGE UP
SODIUM SERPL-SCNC: 142 MMOL/L — SIGNIFICANT CHANGE UP (ref 135–145)
SODIUM SERPL-SCNC: 143 MMOL/L — SIGNIFICANT CHANGE UP (ref 135–145)
SODIUM SERPL-SCNC: 144 MMOL/L — SIGNIFICANT CHANGE UP (ref 135–145)
TOXIC GRANULES BLD QL SMEAR: PRESENT — SIGNIFICANT CHANGE UP
WBC # BLD: 2.63 K/UL — LOW (ref 3.8–10.5)
WBC # FLD AUTO: 2.63 K/UL — LOW (ref 3.8–10.5)

## 2022-12-10 PROCEDURE — 99233 SBSQ HOSP IP/OBS HIGH 50: CPT

## 2022-12-10 RX ORDER — FILGRASTIM 480MCG/1.6
300 VIAL (ML) INJECTION ONCE
Refills: 0 | Status: COMPLETED | OUTPATIENT
Start: 2022-12-10 | End: 2022-12-10

## 2022-12-10 RX ORDER — POTASSIUM CHLORIDE 20 MEQ
40 PACKET (EA) ORAL EVERY 4 HOURS
Refills: 0 | Status: COMPLETED | OUTPATIENT
Start: 2022-12-10 | End: 2022-12-10

## 2022-12-10 RX ADMIN — PIPERACILLIN AND TAZOBACTAM 25 GRAM(S): 4; .5 INJECTION, POWDER, LYOPHILIZED, FOR SOLUTION INTRAVENOUS at 13:20

## 2022-12-10 RX ADMIN — PIPERACILLIN AND TAZOBACTAM 25 GRAM(S): 4; .5 INJECTION, POWDER, LYOPHILIZED, FOR SOLUTION INTRAVENOUS at 21:49

## 2022-12-10 RX ADMIN — Medication 1 TABLET(S): at 13:24

## 2022-12-10 RX ADMIN — REMDESIVIR 500 MILLIGRAM(S): 5 INJECTION INTRAVENOUS at 11:39

## 2022-12-10 RX ADMIN — Medication 300 MICROGRAM(S): at 17:46

## 2022-12-10 RX ADMIN — Medication 400 MILLIGRAM(S): at 17:47

## 2022-12-10 RX ADMIN — CHLORHEXIDINE GLUCONATE 1 APPLICATION(S): 213 SOLUTION TOPICAL at 13:28

## 2022-12-10 RX ADMIN — APIXABAN 5 MILLIGRAM(S): 2.5 TABLET, FILM COATED ORAL at 17:47

## 2022-12-10 RX ADMIN — Medication 100 MILLIGRAM(S): at 13:23

## 2022-12-10 RX ADMIN — Medication 40 MILLIEQUIVALENT(S): at 13:24

## 2022-12-10 RX ADMIN — Medication 400 MILLIGRAM(S): at 05:42

## 2022-12-10 RX ADMIN — APIXABAN 5 MILLIGRAM(S): 2.5 TABLET, FILM COATED ORAL at 05:42

## 2022-12-10 RX ADMIN — Medication 40 MILLIEQUIVALENT(S): at 17:46

## 2022-12-10 RX ADMIN — Medication 1200 MILLIGRAM(S): at 05:42

## 2022-12-10 RX ADMIN — ALBUTEROL 2 PUFF(S): 90 AEROSOL, METERED ORAL at 05:42

## 2022-12-10 RX ADMIN — ALBUTEROL 2 PUFF(S): 90 AEROSOL, METERED ORAL at 17:47

## 2022-12-10 RX ADMIN — ATORVASTATIN CALCIUM 40 MILLIGRAM(S): 80 TABLET, FILM COATED ORAL at 21:46

## 2022-12-10 RX ADMIN — PIPERACILLIN AND TAZOBACTAM 25 GRAM(S): 4; .5 INJECTION, POWDER, LYOPHILIZED, FOR SOLUTION INTRAVENOUS at 06:37

## 2022-12-10 NOTE — PROVIDER CONTACT NOTE (CRITICAL VALUE NOTIFICATION) - ACTION/TREATMENT ORDERED:
administer iv K+ and magnesium iv and po potassium & repeat labs
As per HAMZAH Elder repeat BMP stat

## 2022-12-10 NOTE — PROGRESS NOTE ADULT - SUBJECTIVE AND OBJECTIVE BOX
Andre Reyes, M.D.  Pager: [phone]  Office: [phone]    Patient is a 72y old  Male who presents with a chief complaint of SOB, Cough and sepsis (09 Dec 2022 22:27)          SUBJECTIVE / OVERNIGHT EVENTS:    No acute overnight events.    ROS: ( - ) Fever, ( - )Chills,  ( - )Nausea/Vomiting, ( - ) Cough, ( - )Shortness of breath, ( - )Chest Pain    MEDICATIONS  (STANDING):  acyclovir   Oral Tab/Cap 400 milliGRAM(s) Oral two times a day  albuterol    90 MICROgram(s) HFA Inhaler 2 Puff(s) Inhalation two times a day  allopurinol 100 milliGRAM(s) Oral daily  apixaban 5 milliGRAM(s) Oral two times a day  atorvastatin 40 milliGRAM(s) Oral at bedtime  chlorhexidine 2% Cloths 1 Application(s) Topical daily  filgrastim-sndz (ZARXIO) Injectable 300 MICROGram(s) SubCutaneous daily  guaiFENesin ER 1200 milliGRAM(s) Oral every 12 hours  multivitamin 1 Tablet(s) Oral daily  piperacillin/tazobactam IVPB.. 3.375 Gram(s) IV Intermittent every 8 hours  remdesivir  IVPB 100 milliGRAM(s) IV Intermittent every 24 hours  remdesivir  IVPB   IV Intermittent     MEDICATIONS  (PRN):  acetaminophen     Tablet .. 650 milliGRAM(s) Oral every 6 hours PRN Temp greater or equal to 38C (100.4F), Mild Pain (1 - 3)  melatonin 3 milliGRAM(s) Oral at bedtime PRN Insomnia  ondansetron Injectable 4 milliGRAM(s) IV Push every 8 hours PRN Nausea and/or Vomiting          T(C): 36.8 (12-10 @ 06:00), Max: 38.1 (12-09 @ 23:00)   HR: 95   BP: 100/49   RR: 18   SpO2: 95%    PHYSICAL EXAM:    CONSTITUTIONAL: NAD, well-developed, well-groomed  EYES: PERRLA; conjunctiva and sclera clear  ENMT: Moist oral mucosa, no pharyngeal injection or exudates; normal dentition  NECK: Supple, no palpable masses; no thyromegaly  RESPIRATORY: Normal respiratory effort; lungs are clear to auscultation bilaterally  CARDIOVASCULAR: Regular rate and rhythm, normal S1 and S2, no murmur/rub/gallop; No lower extremity edema; Peripheral pulses are 2+ bilaterally  ABDOMEN: Nontender to palpation, normoactive bowel sounds, no rebound/guarding; No hepatosplenomegaly  MUSCULOSKELETAL:  no clubbing or cyanosis of digits; no joint swelling or tenderness to palpation  PSYCH: A+O to person, place, and time; affect appropriate  NEUROLOGY: CN 2-12 are intact and symmetric; no gross sensory deficits   SKIN: No rashes; no palpable lesions      LABS:                        7.5    2.63  )-----------( 163      ( 10 Dec 2022 06:49 )             25.5      12-10    142  |  106  |  29<H>  ----------------------------<  93  2.9<LL>   |  23  |  1.15    Ca    8.5      10 Dec 2022 06:50         CAPILLARY BLOOD GLUCOSE          RADIOLOGY & ADDITIONAL TESTS:    Imaging Personally Reviewed:  Consultant(s) Notes Reviewed:    Care Discussed with Consultants/Other Providers:   Andre Reyes, M.D.  Pager: [phone]  Office: [phone]    Patient is a 72y old  Male who presents with a chief complaint of SOB, Cough and sepsis (09 Dec 2022 22:27)          SUBJECTIVE / OVERNIGHT EVENTS:    No acute overnight events.  feeling a little bit better.    ROS: ( - ) Fever, ( - )Chills,  ( - )Nausea/Vomiting, ( - ) Cough, ( - )Shortness of breath, ( - )Chest Pain    MEDICATIONS  (STANDING):  acyclovir   Oral Tab/Cap 400 milliGRAM(s) Oral two times a day  albuterol    90 MICROgram(s) HFA Inhaler 2 Puff(s) Inhalation two times a day  allopurinol 100 milliGRAM(s) Oral daily  apixaban 5 milliGRAM(s) Oral two times a day  atorvastatin 40 milliGRAM(s) Oral at bedtime  chlorhexidine 2% Cloths 1 Application(s) Topical daily  filgrastim-sndz (ZARXIO) Injectable 300 MICROGram(s) SubCutaneous daily  guaiFENesin ER 1200 milliGRAM(s) Oral every 12 hours  multivitamin 1 Tablet(s) Oral daily  piperacillin/tazobactam IVPB.. 3.375 Gram(s) IV Intermittent every 8 hours  remdesivir  IVPB 100 milliGRAM(s) IV Intermittent every 24 hours  remdesivir  IVPB   IV Intermittent     MEDICATIONS  (PRN):  acetaminophen     Tablet .. 650 milliGRAM(s) Oral every 6 hours PRN Temp greater or equal to 38C (100.4F), Mild Pain (1 - 3)  melatonin 3 milliGRAM(s) Oral at bedtime PRN Insomnia  ondansetron Injectable 4 milliGRAM(s) IV Push every 8 hours PRN Nausea and/or Vomiting          T(C): 36.8 (12-10 @ 06:00), Max: 38.1 (12-09 @ 23:00)   HR: 95   BP: 100/49   RR: 18   SpO2: 95%    PHYSICAL EXAM:    CONSTITUTIONAL: NAD, well-developed, well-groomed  EYES: PERRLA; conjunctiva and sclera clear  ENMT: Moist oral mucosa, no pharyngeal injection or exudates; normal dentition  NECK: Supple, no palpable masses; no thyromegaly  RESPIRATORY: Normal respiratory effort; lungs are clear to auscultation bilaterally  CARDIOVASCULAR: Regular rate and rhythm, normal S1 and S2, no murmur/rub/gallop; No lower extremity edema; Peripheral pulses are 2+ bilaterally  ABDOMEN: Nontender to palpation, normoactive bowel sounds, no rebound/guarding; No hepatosplenomegaly  MUSCULOSKELETAL:  no clubbing or cyanosis of digits; no joint swelling or tenderness to palpation  PSYCH: A+O to person, place, and time; affect appropriate  NEUROLOGY: CN 2-12 are intact and symmetric; no gross sensory deficits   SKIN: No rashes; no palpable lesions      LABS:                        7.5    2.63  )-----------( 163      ( 10 Dec 2022 06:49 )             25.5      12-10    142  |  106  |  29<H>  ----------------------------<  93  2.9<LL>   |  23  |  1.15    Ca    8.5      10 Dec 2022 06:50         CAPILLARY BLOOD GLUCOSE          RADIOLOGY & ADDITIONAL TESTS:    Imaging Personally Reviewed:  Consultant(s) Notes Reviewed:    Care Discussed with Consultants/Other Providers:

## 2022-12-10 NOTE — PROVIDER CONTACT NOTE (CRITICAL VALUE NOTIFICATION) - ASSESSMENT
pt  vss. pt mental status at baseline
Alert and oriented X 3. Skin color good warm and dry to touch. Respirations regular and unlabored. Denies pain or discomfort.

## 2022-12-10 NOTE — PROVIDER CONTACT NOTE (CRITICAL VALUE NOTIFICATION) - PERSON GIVING RESULT:
rigoberto flores [facility] lab
Ed Brandon [facility]

## 2022-12-11 LAB
ALBUMIN SERPL ELPH-MCNC: 3 G/DL — LOW (ref 3.3–5)
ALP SERPL-CCNC: 107 U/L — SIGNIFICANT CHANGE UP (ref 40–120)
ALT FLD-CCNC: 14 U/L — SIGNIFICANT CHANGE UP (ref 10–45)
ANION GAP SERPL CALC-SCNC: 12 MMOL/L — SIGNIFICANT CHANGE UP (ref 5–17)
AST SERPL-CCNC: 30 U/L — SIGNIFICANT CHANGE UP (ref 10–40)
BILIRUB SERPL-MCNC: 0.8 MG/DL — SIGNIFICANT CHANGE UP (ref 0.2–1.2)
BUN SERPL-MCNC: 20 MG/DL — SIGNIFICANT CHANGE UP (ref 7–23)
CALCIUM SERPL-MCNC: 8.8 MG/DL — SIGNIFICANT CHANGE UP (ref 8.4–10.5)
CHLORIDE SERPL-SCNC: 109 MMOL/L — HIGH (ref 96–108)
CO2 SERPL-SCNC: 23 MMOL/L — SIGNIFICANT CHANGE UP (ref 22–31)
CREAT SERPL-MCNC: 1.03 MG/DL — SIGNIFICANT CHANGE UP (ref 0.5–1.3)
CULTURE RESULTS: SIGNIFICANT CHANGE UP
CULTURE RESULTS: SIGNIFICANT CHANGE UP
EGFR: 78 ML/MIN/1.73M2 — SIGNIFICANT CHANGE UP
GLUCOSE SERPL-MCNC: 85 MG/DL — SIGNIFICANT CHANGE UP (ref 70–99)
HCT VFR BLD CALC: 27.7 % — LOW (ref 39–50)
HGB BLD-MCNC: 8 G/DL — LOW (ref 13–17)
MCHC RBC-ENTMCNC: 26.2 PG — LOW (ref 27–34)
MCHC RBC-ENTMCNC: 28.9 GM/DL — LOW (ref 32–36)
MCV RBC AUTO: 90.8 FL — SIGNIFICANT CHANGE UP (ref 80–100)
NRBC # BLD: 0 /100 WBCS — SIGNIFICANT CHANGE UP (ref 0–0)
PLATELET # BLD AUTO: 175 K/UL — SIGNIFICANT CHANGE UP (ref 150–400)
POTASSIUM SERPL-MCNC: 3.5 MMOL/L — SIGNIFICANT CHANGE UP (ref 3.5–5.3)
POTASSIUM SERPL-SCNC: 3.5 MMOL/L — SIGNIFICANT CHANGE UP (ref 3.5–5.3)
PROT SERPL-MCNC: 6 G/DL — SIGNIFICANT CHANGE UP (ref 6–8.3)
RBC # BLD: 3.05 M/UL — LOW (ref 4.2–5.8)
RBC # FLD: 17.2 % — HIGH (ref 10.3–14.5)
SODIUM SERPL-SCNC: 144 MMOL/L — SIGNIFICANT CHANGE UP (ref 135–145)
SPECIMEN SOURCE: SIGNIFICANT CHANGE UP
SPECIMEN SOURCE: SIGNIFICANT CHANGE UP
WBC # BLD: 5.23 K/UL — SIGNIFICANT CHANGE UP (ref 3.8–10.5)
WBC # FLD AUTO: 5.23 K/UL — SIGNIFICANT CHANGE UP (ref 3.8–10.5)

## 2022-12-11 PROCEDURE — 99233 SBSQ HOSP IP/OBS HIGH 50: CPT

## 2022-12-11 RX ORDER — HALOPERIDOL DECANOATE 100 MG/ML
1 INJECTION INTRAMUSCULAR ONCE
Refills: 0 | Status: COMPLETED | OUTPATIENT
Start: 2022-12-11 | End: 2022-12-11

## 2022-12-11 RX ADMIN — Medication 1 TABLET(S): at 12:07

## 2022-12-11 RX ADMIN — Medication 650 MILLIGRAM(S): at 22:39

## 2022-12-11 RX ADMIN — Medication 100 MILLIGRAM(S): at 12:07

## 2022-12-11 RX ADMIN — ALBUTEROL 2 PUFF(S): 90 AEROSOL, METERED ORAL at 05:15

## 2022-12-11 RX ADMIN — ATORVASTATIN CALCIUM 40 MILLIGRAM(S): 80 TABLET, FILM COATED ORAL at 21:18

## 2022-12-11 RX ADMIN — HALOPERIDOL DECANOATE 1 MILLIGRAM(S): 100 INJECTION INTRAMUSCULAR at 22:06

## 2022-12-11 RX ADMIN — Medication 400 MILLIGRAM(S): at 17:30

## 2022-12-11 RX ADMIN — PIPERACILLIN AND TAZOBACTAM 25 GRAM(S): 4; .5 INJECTION, POWDER, LYOPHILIZED, FOR SOLUTION INTRAVENOUS at 15:44

## 2022-12-11 RX ADMIN — Medication 650 MILLIGRAM(S): at 23:39

## 2022-12-11 RX ADMIN — ALBUTEROL 2 PUFF(S): 90 AEROSOL, METERED ORAL at 17:30

## 2022-12-11 RX ADMIN — Medication 1200 MILLIGRAM(S): at 05:15

## 2022-12-11 RX ADMIN — PIPERACILLIN AND TAZOBACTAM 25 GRAM(S): 4; .5 INJECTION, POWDER, LYOPHILIZED, FOR SOLUTION INTRAVENOUS at 05:58

## 2022-12-11 RX ADMIN — PIPERACILLIN AND TAZOBACTAM 25 GRAM(S): 4; .5 INJECTION, POWDER, LYOPHILIZED, FOR SOLUTION INTRAVENOUS at 21:17

## 2022-12-11 RX ADMIN — Medication 3 MILLIGRAM(S): at 21:18

## 2022-12-11 RX ADMIN — Medication 400 MILLIGRAM(S): at 05:15

## 2022-12-11 RX ADMIN — REMDESIVIR 500 MILLIGRAM(S): 5 INJECTION INTRAVENOUS at 09:41

## 2022-12-11 RX ADMIN — APIXABAN 5 MILLIGRAM(S): 2.5 TABLET, FILM COATED ORAL at 05:15

## 2022-12-11 RX ADMIN — CHLORHEXIDINE GLUCONATE 1 APPLICATION(S): 213 SOLUTION TOPICAL at 12:06

## 2022-12-11 RX ADMIN — APIXABAN 5 MILLIGRAM(S): 2.5 TABLET, FILM COATED ORAL at 17:29

## 2022-12-11 NOTE — PROGRESS NOTE ADULT - SUBJECTIVE AND OBJECTIVE BOX
Andre Reyes, M.D.  Pager: [phone]  Office: [phone]    Patient is a 72y old  Male who presents with a chief complaint of SOB, Cough and sepsis (10 Dec 2022 11:39)          SUBJECTIVE / OVERNIGHT EVENTS:    No acute overnight events.    ROS: ( - ) Fever, ( - )Chills,  ( - )Nausea/Vomiting, ( - ) Cough, ( - )Shortness of breath, ( - )Chest Pain    MEDICATIONS  (STANDING):  acyclovir   Oral Tab/Cap 400 milliGRAM(s) Oral two times a day  albuterol    90 MICROgram(s) HFA Inhaler 2 Puff(s) Inhalation two times a day  allopurinol 100 milliGRAM(s) Oral daily  apixaban 5 milliGRAM(s) Oral two times a day  atorvastatin 40 milliGRAM(s) Oral at bedtime  chlorhexidine 2% Cloths 1 Application(s) Topical daily  guaiFENesin ER 1200 milliGRAM(s) Oral every 12 hours  multivitamin 1 Tablet(s) Oral daily  piperacillin/tazobactam IVPB.. 3.375 Gram(s) IV Intermittent every 8 hours    MEDICATIONS  (PRN):  acetaminophen     Tablet .. 650 milliGRAM(s) Oral every 6 hours PRN Temp greater or equal to 38C (100.4F), Mild Pain (1 - 3)  melatonin 3 milliGRAM(s) Oral at bedtime PRN Insomnia  ondansetron Injectable 4 milliGRAM(s) IV Push every 8 hours PRN Nausea and/or Vomiting          T(C): 36.7 (12-10 @ 20:55), Max: 36.7 (12-10 @ 20:55)   HR: 88   BP: 112/69   RR: 17   SpO2: 98%    PHYSICAL EXAM:    CONSTITUTIONAL: NAD, well-developed, well-groomed  EYES: PERRLA; conjunctiva and sclera clear  ENMT: Moist oral mucosa, no pharyngeal injection or exudates; normal dentition  NECK: Supple, no palpable masses; no thyromegaly  RESPIRATORY: Normal respiratory effort; lungs are clear to auscultation bilaterally  CARDIOVASCULAR: Regular rate and rhythm, normal S1 and S2, no murmur/rub/gallop; No lower extremity edema; Peripheral pulses are 2+ bilaterally  ABDOMEN: Nontender to palpation, normoactive bowel sounds, no rebound/guarding; No hepatosplenomegaly  MUSCULOSKELETAL:  no clubbing or cyanosis of digits; no joint swelling or tenderness to palpation  PSYCH: A+O to person, place, and time; affect appropriate  NEUROLOGY: CN 2-12 are intact and symmetric; no gross sensory deficits   SKIN: No rashes; no palpable lesions      LABS:                        7.5    2.63  )-----------( 163      ( 10 Dec 2022 06:49 )             25.5      12-10    144  |  107  |  26<H>  ----------------------------<  84  3.5   |  23  |  1.12    Ca    8.8      10 Dec 2022 20:40         CAPILLARY BLOOD GLUCOSE          RADIOLOGY & ADDITIONAL TESTS:    Imaging Personally Reviewed:  Consultant(s) Notes Reviewed:    Care Discussed with Consultants/Other Providers:   Andre Reyes, M.D.  Pager: [phone]  Office: [phone]    Patient is a 72y old  Male who presents with a chief complaint of SOB, Cough and sepsis (10 Dec 2022 11:39)          SUBJECTIVE / OVERNIGHT EVENTS:    No acute overnight events.  breathing feels better but feels weak    ROS: ( - ) Fever, ( - )Chills,  ( - )Nausea/Vomiting, ( - ) Cough, ( - )Shortness of breath, ( - )Chest Pain    MEDICATIONS  (STANDING):  acyclovir   Oral Tab/Cap 400 milliGRAM(s) Oral two times a day  albuterol    90 MICROgram(s) HFA Inhaler 2 Puff(s) Inhalation two times a day  allopurinol 100 milliGRAM(s) Oral daily  apixaban 5 milliGRAM(s) Oral two times a day  atorvastatin 40 milliGRAM(s) Oral at bedtime  chlorhexidine 2% Cloths 1 Application(s) Topical daily  guaiFENesin ER 1200 milliGRAM(s) Oral every 12 hours  multivitamin 1 Tablet(s) Oral daily  piperacillin/tazobactam IVPB.. 3.375 Gram(s) IV Intermittent every 8 hours    MEDICATIONS  (PRN):  acetaminophen     Tablet .. 650 milliGRAM(s) Oral every 6 hours PRN Temp greater or equal to 38C (100.4F), Mild Pain (1 - 3)  melatonin 3 milliGRAM(s) Oral at bedtime PRN Insomnia  ondansetron Injectable 4 milliGRAM(s) IV Push every 8 hours PRN Nausea and/or Vomiting          T(C): 36.7 (12-10 @ 20:55), Max: 36.7 (12-10 @ 20:55)   HR: 88   BP: 112/69   RR: 17   SpO2: 98%    PHYSICAL EXAM:    CONSTITUTIONAL: NAD, well-developed, well-groomed  EYES: PERRLA; conjunctiva and sclera clear  ENMT: Moist oral mucosa, no pharyngeal injection or exudates; normal dentition  NECK: Supple, no palpable masses; no thyromegaly  RESPIRATORY: Normal respiratory effort; lungs are clear to auscultation bilaterally  CARDIOVASCULAR: Regular rate and rhythm, normal S1 and S2, no murmur/rub/gallop; No lower extremity edema; Peripheral pulses are 2+ bilaterally  ABDOMEN: Nontender to palpation, normoactive bowel sounds, no rebound/guarding; No hepatosplenomegaly  MUSCULOSKELETAL:  no clubbing or cyanosis of digits; no joint swelling or tenderness to palpation  PSYCH: A+O to person, place, and time; affect appropriate  NEUROLOGY: CN 2-12 are intact and symmetric; no gross sensory deficits   SKIN: No rashes; no palpable lesions      LABS:                        7.5    2.63  )-----------( 163      ( 10 Dec 2022 06:49 )             25.5      12-10    144  |  107  |  26<H>  ----------------------------<  84  3.5   |  23  |  1.12    Ca    8.8      10 Dec 2022 20:40         CAPILLARY BLOOD GLUCOSE          RADIOLOGY & ADDITIONAL TESTS:    Imaging Personally Reviewed:  Consultant(s) Notes Reviewed:    Care Discussed with Consultants/Other Providers:

## 2022-12-12 ENCOUNTER — TRANSCRIPTION ENCOUNTER (OUTPATIENT)
Age: 72
End: 2022-12-12

## 2022-12-12 PROCEDURE — 99232 SBSQ HOSP IP/OBS MODERATE 35: CPT

## 2022-12-12 RX ADMIN — Medication 650 MILLIGRAM(S): at 14:34

## 2022-12-12 RX ADMIN — Medication 400 MILLIGRAM(S): at 06:04

## 2022-12-12 RX ADMIN — ALBUTEROL 2 PUFF(S): 90 AEROSOL, METERED ORAL at 06:05

## 2022-12-12 RX ADMIN — Medication 1 TABLET(S): at 11:21

## 2022-12-12 RX ADMIN — Medication 100 MILLIGRAM(S): at 11:21

## 2022-12-12 RX ADMIN — CHLORHEXIDINE GLUCONATE 1 APPLICATION(S): 213 SOLUTION TOPICAL at 11:19

## 2022-12-12 RX ADMIN — PIPERACILLIN AND TAZOBACTAM 25 GRAM(S): 4; .5 INJECTION, POWDER, LYOPHILIZED, FOR SOLUTION INTRAVENOUS at 06:05

## 2022-12-12 RX ADMIN — APIXABAN 5 MILLIGRAM(S): 2.5 TABLET, FILM COATED ORAL at 17:48

## 2022-12-12 RX ADMIN — ATORVASTATIN CALCIUM 40 MILLIGRAM(S): 80 TABLET, FILM COATED ORAL at 21:09

## 2022-12-12 RX ADMIN — PIPERACILLIN AND TAZOBACTAM 25 GRAM(S): 4; .5 INJECTION, POWDER, LYOPHILIZED, FOR SOLUTION INTRAVENOUS at 21:09

## 2022-12-12 RX ADMIN — ALBUTEROL 2 PUFF(S): 90 AEROSOL, METERED ORAL at 17:47

## 2022-12-12 RX ADMIN — Medication 400 MILLIGRAM(S): at 17:48

## 2022-12-12 RX ADMIN — APIXABAN 5 MILLIGRAM(S): 2.5 TABLET, FILM COATED ORAL at 06:04

## 2022-12-12 RX ADMIN — PIPERACILLIN AND TAZOBACTAM 25 GRAM(S): 4; .5 INJECTION, POWDER, LYOPHILIZED, FOR SOLUTION INTRAVENOUS at 14:34

## 2022-12-12 RX ADMIN — Medication 650 MILLIGRAM(S): at 15:04

## 2022-12-12 RX ADMIN — Medication 1200 MILLIGRAM(S): at 06:04

## 2022-12-12 NOTE — DISCHARGE NOTE PROVIDER - NSDCFUSCHEDAPPT_GEN_ALL_CORE_FT
[facility]  [facility]  Scheduled Appointment: 12/30/2022    [facility]  ELECTROPH [address]  Scheduled Appointment: 01/12/2023    Lon Reyes  [facility]  CARDIOLOGY [address] [facility]   Scheduled Appointment: 01/16/2023    [facility]  [facility]  Scheduled Appointment: 01/20/2023    Amari Hickman  [facility]  Med GenInt [address] [facility]  Scheduled Appointment: 02/21/2023     Toby Hardin  [facility]  NEUROLOGY [address] [facility]  Scheduled Appointment: 12/27/2022    [facility]  [facility]  Scheduled Appointment: 12/30/2022    [facility]  ELECTROPH [address]  Scheduled Appointment: 01/12/2023    Lon Reyes  [facility]  CARDIOLOGY [address] [facility]   Scheduled Appointment: 01/16/2023    [facility]  [facility]  Scheduled Appointment: 01/20/2023    Amari Hickman  [facility]  Med GenInt [address] [facility]  Scheduled Appointment: 02/21/2023

## 2022-12-12 NOTE — PROGRESS NOTE ADULT - SUBJECTIVE AND OBJECTIVE BOX
CC: F/U for COVID    Saw/spoke to patient. No fevers, no chills. No new complaints.    Allergies  rasburicase (Other)    ANTIMICROBIALS:  acyclovir   Oral Tab/Cap 400 two times a day  piperacillin/tazobactam IVPB.. 3.375 every 8 hours    PE:    Vital Signs Last 24 Hrs  T(C): 36.4 (12 Dec 2022 14:23), Max: 37.1 (12 Dec 2022 04:37)  T(F): 97.5 (12 Dec 2022 14:23), Max: 98.8 (12 Dec 2022 04:37)  HR: 84 (12 Dec 2022 14:23) (84 - 94)  BP: 120/65 (12 Dec 2022 14:23) (120/65 - 150/69)  RR: 18 (12 Dec 2022 14:23) (17 - 18)  SpO2: 99% (12 Dec 2022 14:23) (96% - 99%)    Gen: AOx3, NAD, non-toxic  CV: Nontachycardic  Resp: Breathing comfortably, RA  Abd: Soft, nontender  IV/Skin: No thrombophlebitis    LABS:                        8.0    5.23  )-----------( 175      ( 11 Dec 2022 10:03 )             27.7     12-11    144  |  109<H>  |  20  ----------------------------<  85  3.5   |  23  |  1.03    Ca    8.8      11 Dec 2022 10:03    TPro  6.0  /  Alb  3.0<L>  /  TBili  0.8  /  DBili  x   /  AST  30  /  ALT  14  /  AlkPhos  107  12-11    MICROBIOLOGY:    .Blood Blood-Peripheral  12-06-22   No Growth Final  --  --    .Blood Blood-Peripheral  12-06-22   No Growth Final  --  --    Rapid RVP Result: Detected (12-06 @ 17:04) COVID    RADIOLOGY:    12/6 CT:    IMPRESSION:  No pulmonary embolism.    Redemonstration of multiple consolidative opacities throughout the lungs,   some of which are decreased and some of which are increased/new.    A loculated effusion involving the minor fissure is decreased in size.

## 2022-12-12 NOTE — DISCHARGE NOTE PROVIDER - NSDCFUADDAPPT_GEN_ALL_CORE_FT
APPTS ARE READY TO BE MADE: [x] YES    Best Family or Patient Contact (if needed):    Additional Information about above appointments (if needed):    1: Hansar or any neurologist asap  2:   3:     Other comments or requests:    APPTS ARE READY TO BE MADE: [x] YES    Best Family or Patient Contact (if needed):    Additional Information about above appointments (if needed):    1: Pishanidar or any neurologist asap  2:   3:     Other comments or requests:     Patient was previously scheduled with Toby Harkins on 12/27 2pm at [address] [place] [address] APPTS ARE READY TO BE MADE: [x] YES    Best Family or Patient Contact (if needed):  Daughter is best to coordinate with [phone]    Additional Information about above appointments (if needed):    1: Chely or any neurologist asap  2:   3:     Other comments or requests:     Patient was previously scheduled with Toby Harkins on 12/27 2pm at [address] [place] [address]

## 2022-12-12 NOTE — DISCHARGE NOTE PROVIDER - CARE PROVIDER_API CALL
Stone Mo)  Neurology; Vascular Neurology  [address] [facility] [address]  [place] [address]  Phone: [phone]  Fax: [phone]  Follow Up Time:    Stone Mo)  Neurology; Vascular Neurology  [address] [facility]  [place] [address]  Phone: [phone]  Fax: [phone]  Follow Up Time:     Jason Cordova (DO)  Internal Medicine; Medical Oncology  [address] [place] [address]  Phone: [phone]  Fax: [phone]  Follow Up Time:

## 2022-12-12 NOTE — DISCHARGE NOTE PROVIDER - HOSPITAL COURSE
72 year old male with afib/ aflutter (on eliquis), HTN,  complete heart block s/p PPM, HLD, HFrEF (30% in 09/2022), and DLBCL (tx with R-CHOP in 2003, with relapse in 2009 treated with BR) now with recently diagnosed grade 3 follicular lymphoma last chemo in October p/w cough and SOB found to have sepsis 2/2 COVID-19 infection and possibly bacterial PNA     Nutritional Assessment:  · Nutritional Assessment	This patient has been assessed with a concern for Malnutrition and has been determined to have a diagnosis/diagnoses of Severe protein-calorie malnutrition and Underweight (BMI < 19).    This patient is being managed with:   Diet Regular-  Easy to Chew (EASYTOCHEW)  Supplement Feeding Modality:  Oral  Ensure Clear Cans or Servings Per Day:  2       Frequency:  Daily  Entered: Dec  9 2022 12:44PM     Problem/Plan - 1:  ·  Problem: Sepsis.   ·  Plan: resolving  sepsis present on admission due to pulmonary infection. COVID-19 +/- bacterial PNA.  blood Culture Results: No growth to date. (12.06.22 @ 16:45).     Problem/Plan - 2:  ·  Problem: Gram-negative pneumonia.   ·  Plan: Note CTA chest findings. Could be residual radiographic findings from recent PNA but given frequent episodes of bacterial pna and pt comorbidities, will cont. to Rx gram negative PNA for now  -Complete IV Zosyn 12/12.     Problem/Plan - 3:  ·  Problem: 2019 novel coronavirus disease (COVID-19).   ·  Plan: COVID positive on swab. Oxygenating 100% on RA at rest on my examination.  -completed Remdesivir  -Trend hepatic and renal function  -Supportive care.       Problem/Plan - 5:  ·  Problem: Chronic HFrEF (heart failure with reduced ejection fraction).   ·  Plan: 11/2022 TTE w/ EF 30-35%.   -Daughter states pt now off Entresto, metoprolol and spironolactone will confirm no diuretics, not on oxygen  -Euvolumic  -Has outpt cardiology f/u in january to readdress this issue     Problem/Plan - 6:  ·  Problem: Paroxysmal atrial fibrillation.   ·  Plan: -Cont. Eliquis BID.     Problem/Plan - 7:  ·  Problem: Lymphoma.   ·  Plan: Relapsed DLBCL, currently being treated with O-COEP.   -Patient to follow up with Dr. Cordova at [facility]   -Reportedly did not get chemo scheduled for 12/6  -Received neuopogen here.     Problem/Plan - 8:  ·  Problem: DVT prophylaxis.   ·  Plan: -Cont. Eliquis.       72 year old male with afib/ aflutter (on eliquis), HTN,  complete heart block s/p PPM, HLD, HFrEF (30% in 09/2022), and DLBCL (tx with R-CHOP in 2003, with relapse in 2009 treated with BR) now with recently diagnosed grade 3 follicular lymphoma last chemo in October p/w cough and SOB found to have sepsis 2/2 COVID-19 infection and possibly bacterial PNA     Nutritional Assessment:  · Nutritional Assessment	This patient has been assessed with a concern for Malnutrition and has been determined to have a diagnosis/diagnoses of Severe protein-calorie malnutrition and Underweight (BMI < 19).    This patient is being managed with:   Diet Regular-  Easy to Chew (EASYTOCHEW)  Supplement Feeding Modality:  Oral  Ensure Clear Cans or Servings Per Day:  2       Frequency:  Daily  Entered: Dec  9 2022 12:44PM     Problem/Plan - 1:  ·  Problem: Sepsis.   ·  Plan: resolving  sepsis present on admission due to pulmonary infection. COVID-19 +/- bacterial PNA.  blood Culture Results: No growth to date. (12.06.22 @ 16:45).     Problem/Plan - 2:  ·  Problem: Gram-negative pneumonia.   ·  Plan: Note CTA chest findings. Could be residual radiographic findings from recent PNA but given frequent episodes of bacterial pna and pt comorbidities, will cont. to Rx gram negative PNA for now  -Complete IV Zosyn 12/12.     Problem/Plan - 3:  ·  Problem: 2019 novel coronavirus disease (COVID-19).   ·  Plan: COVID positive on swab. Oxygenating 100% on RA at rest on my examination.  -completed Remdesivir  -Trend hepatic and renal function  -Supportive care.       Problem/Plan - 5:  ·  Problem: Chronic HFrEF (heart failure with reduced ejection fraction).   ·  Plan: 11/2022 TTE w/ EF 30-35%.   -Daughter states pt now off Entresto, metoprolol and spironolactone since his rehab DC but is not sure why    -Euvolumic  -Has outpt cardiology f/u in january to readdress this issue and I notified Dr. Don      Problem/Plan - 6:  ·  Problem: Paroxysmal atrial fibrillation.   ·  Plan: -Cont. Eliquis BID.     Problem/Plan - 7:  ·  Problem: Lymphoma.   ·  Plan: Relapsed DLBCL, currently being treated with O-COEP.   -Patient to follow up with Dr. Cordova at [facility]   -Reportedly did not get chemo scheduled for 12/6  -Received neuopogen here.     Problem/Plan - 8:  ·  Problem: DVT prophylaxis.   ·  Plan: -Cont. Eliquis.       72 year old male with afib/ aflutter (on eliquis), HTN,  complete heart block s/p PPM, HLD, HFrEF (30% in 09/2022), and DLBCL (tx with R-CHOP in 2003, with relapse in 2009 treated with BR) now with recently diagnosed grade 3 follicular lymphoma last chemo in October p/w cough and SOB found to have sepsis 2/2 COVID-19 infection and possibly bacterial PNA     Nutritional Assessment:  · Nutritional Assessment	This patient has been assessed with a concern for Malnutrition and has been determined to have a diagnosis/diagnoses of Severe protein-calorie malnutrition and Underweight (BMI < 19).    This patient is being managed with:   Diet Regular-  Easy to Chew (EASYTOCHEW)  Supplement Feeding Modality:  Oral  Ensure Clear Cans or Servings Per Day:  2       Frequency:  Daily  Entered: Dec  9 2022 12:44PM     Problem/Plan - 1:  ·  Problem: Sepsis.   ·  Plan: resolving  sepsis present on admission due to pulmonary infection. COVID-19 +/- bacterial PNA.  blood Culture Results: No growth to date. (12.06.22 @ 16:45).     Problem/Plan - 2:  ·  Problem: Gram-negative pneumonia.   ·  Plan: Note CTA chest findings. Could be residual radiographic findings from recent PNA but given frequent episodes of bacterial pna and pt comorbidities, will cont. to Rx gram negative PNA for now  -Completed IV Zosyn 12/12.     Problem/Plan - 3:  ·  Problem: 2019 novel coronavirus disease (COVID-19).   ·  Plan: COVID positive on swab. Oxygenating 100% on RA at rest on my examination.  -completed Remdesivir  -Trend hepatic and renal function  -Supportive care.       Problem/Plan - 5:  ·  Problem: Chronic HFrEF (heart failure with reduced ejection fraction).   ·  Plan: 11/2022 TTE w/ EF 30-35%.   -Daughter states pt now off Entresto, metoprolol and spironolactone since his rehab DC but is not sure why    -Euvolumic  -Has outpt cardiology f/u in january to readdress this issue and I notified Dr. Don      Problem/Plan - 6:  ·  Problem: Paroxysmal atrial fibrillation.   ·  Plan: -Cont. Eliquis BID.     Problem/Plan - 7:  ·  Problem: Lymphoma.   ·  Plan: Relapsed DLBCL, currently being treated with O-COEP.   -Patient to follow up with Dr. Cordova at [facility]   -Reportedly did not get chemo scheduled for 12/6  -Received neuopogen here.     Problem/Plan - 8:  ·  Problem: DVT prophylaxis.   ·  Plan: -Cont. Eliquis.

## 2022-12-12 NOTE — DISCHARGE NOTE PROVIDER - PROVIDER TOKENS
PROVIDER:[TOKEN:[[id number]]] PROVIDER:[TOKEN:[[id number]]],PROVIDER:[TOKEN:[[id number]]]

## 2022-12-12 NOTE — DISCHARGE NOTE PROVIDER - DETAILS OF MALNUTRITION DIAGNOSIS/DIAGNOSES
This patient has been assessed with a concern for Malnutrition and was treated during this hospitalization for the following Nutrition diagnosis/diagnoses:     -  12/09/2022: Severe protein-calorie malnutrition   -  12/09/2022: Underweight (BMI < 19)

## 2022-12-12 NOTE — PROGRESS NOTE ADULT - NSPROGADDITIONALINFOA_GEN_ALL_CORE
Finishes abx 12/12 plan to DC 12/13 pt prefers home w/ home pt but will have CM assess safety of this Finishes abx 12/12 plan to DC 12/13 pt prefers home w/ home pt but will have CM assess safety of this and daughter is not sure if she can manage at home.     updated daughter by phone about anticipated DC planning.  She says he was supposed to get neuro and psychiatry f/u I said we can try and coordinate outpt appointments.

## 2022-12-12 NOTE — PROGRESS NOTE ADULT - SUBJECTIVE AND OBJECTIVE BOX
PROGRESS NOTE:   Melissa Ontiveros DO  Hospitalist  Pager [phone]  After 5pm/weekends or if no answer ext: [phone]      Patient is a 72y old  Male who presents with a chief complaint of SOB, Cough and sepsis (11 Dec 2022 10:11)      SUBJECTIVE / OVERNIGHT EVENTS:  Off O2, not coughing, looking well answering some questions     ADDITIONAL REVIEW OF SYSTEMS:  no fever or chills  no n/v/d    MEDICATIONS  (STANDING):  acyclovir   Oral Tab/Cap 400 milliGRAM(s) Oral two times a day  albuterol    90 MICROgram(s) HFA Inhaler 2 Puff(s) Inhalation two times a day  allopurinol 100 milliGRAM(s) Oral daily  apixaban 5 milliGRAM(s) Oral two times a day  atorvastatin 40 milliGRAM(s) Oral at bedtime  chlorhexidine 2% Cloths 1 Application(s) Topical daily  guaiFENesin Oral Liquid (Sugar-Free) 200 milliGRAM(s) Oral once  multivitamin 1 Tablet(s) Oral daily  piperacillin/tazobactam IVPB.. 3.375 Gram(s) IV Intermittent every 8 hours    MEDICATIONS  (PRN):  acetaminophen     Tablet .. 650 milliGRAM(s) Oral every 6 hours PRN Temp greater or equal to 38C (100.4F), Mild Pain (1 - 3)  melatonin 3 milliGRAM(s) Oral at bedtime PRN Insomnia  ondansetron Injectable 4 milliGRAM(s) IV Push every 8 hours PRN Nausea and/or Vomiting      CAPILLARY BLOOD GLUCOSE        I&O's Summary    11 Dec 2022 07:01  -  12 Dec 2022 07:00  --------------------------------------------------------  IN: 800 mL / OUT: 900 mL / NET: -100 mL        PHYSICAL EXAM:  Vital Signs Last 24 Hrs  T(C): 37.1 (12 Dec 2022 04:37), Max: 37.1 (12 Dec 2022 04:37)  T(F): 98.8 (12 Dec 2022 04:37), Max: 98.8 (12 Dec 2022 04:37)  HR: 94 (12 Dec 2022 04:37) (88 - 97)  BP: 145/80 (12 Dec 2022 04:37) (133/89 - 150/69)  BP(mean): --  RR: 18 (12 Dec 2022 04:37) (17 - 18)  SpO2: 96% (12 Dec 2022 04:37) (96% - 98%)    Parameters below as of 12 Dec 2022 04:37  Patient On (Oxygen Delivery Method): room air        CONSTITUTIONAL: NAD, well-developed, non toxic   RESPIRATORY: Normal respiratory effort; lungs are clear to auscultation bilaterally  CARDIOVASCULAR: Regular rate and rhythm, normal S1 and S2, no murmur/rub/gallop; No lower extremity edema; Peripheral pulses are 2+ bilaterally  ABDOMEN: Nontender to palpation, normoactive bowel sounds, no rebound/guarding; No hepatosplenomegaly  MUSCLOSKELETAL: no clubbing or cyanosis of digits; no joint swelling or tenderness to palpation  PSYCH: A+O to person, place    LABS:                        8.0    5.23  )-----------( 175      ( 11 Dec 2022 10:03 )             27.7     12-11    144  |  109<H>  |  20  ----------------------------<  85  3.5   |  23  |  1.03    Ca    8.8      11 Dec 2022 10:03    TPro  6.0  /  Alb  3.0<L>  /  TBili  0.8  /  DBili  x   /  AST  30  /  ALT  14  /  AlkPhos  107  12-11                RADIOLOGY & ADDITIONAL TESTS:  Results Reviewed:   Imaging Personally Reviewed:  Electrocardiogram Personally Reviewed:    COORDINATION OF CARE:  Care Discussed with Consultants/Other Providers [Y/N]:  Prior or Outpatient Records Reviewed [Y/N]:

## 2022-12-12 NOTE — DISCHARGE NOTE PROVIDER - NSDCMRMEDTOKEN_GEN_ALL_CORE_FT
acyclovir 400 mg oral tablet: 1 tab(s) orally 2 times a day  allopurinol 100 mg oral tablet: 1 tab(s) orally once a day  apixaban 5 mg oral tablet: 1 tab(s) orally every 12 hours  atorvastatin 40 mg oral tablet: 1 tab(s) orally once a day (at bedtime)   acyclovir 400 mg oral tablet: 1 tab(s) orally 2 times a day  allopurinol 100 mg oral tablet: 1 tab(s) orally once a day  apixaban 5 mg oral tablet: 1 tab(s) orally every 12 hours  atorvastatin 40 mg oral tablet: 1 tab(s) orally once a day (at bedtime)  Multiple Vitamins oral tablet: 1 tab(s) orally once a day

## 2022-12-12 NOTE — DISCHARGE NOTE PROVIDER - NSDCPNSUBOBJ_GEN_ALL_CORE
#covid-19  #Sepsis POA  #Gram Negative Pneumonia  #Chronic HFrEF  #Paroxysmal Afib  #Active Lymphoma on chemotherapy

## 2022-12-12 NOTE — DISCHARGE NOTE PROVIDER - NSDCCPCAREPLAN_GEN_ALL_CORE_FT
PRINCIPAL DISCHARGE DIAGNOSIS  Diagnosis: Sepsis due to pneumonia  Assessment and Plan of Treatment: COVID 19 and superimposed bacteremial pneumonia now treated  Remain on quarantine until 12/17      SECONDARY DISCHARGE DIAGNOSES  Diagnosis: Lymphoma  Assessment and Plan of Treatment: Follow up with Hematology as outpatient    Diagnosis: Gram-negative pneumonia  Assessment and Plan of Treatment: Completed treatment    Diagnosis: Chronic HFrEF (heart failure with reduced ejection fraction)  Assessment and Plan of Treatment: Follow up with Dr. Don in January for this     PRINCIPAL DISCHARGE DIAGNOSIS  Diagnosis: Sepsis due to pneumonia  Assessment and Plan of Treatment: COVID 19 and superimposed bacteremial pneumonia now treated  Remain on quarantine until 12/17      SECONDARY DISCHARGE DIAGNOSES  Diagnosis: Lymphoma  Assessment and Plan of Treatment: Follow up with Hematology as outpatient    Diagnosis: Gram-negative pneumonia  Assessment and Plan of Treatment: Completed treatment    Diagnosis: Chronic HFrEF (heart failure with reduced ejection fraction)  Assessment and Plan of Treatment: Follow up with Dr. Don in January for this and medication management

## 2022-12-13 LAB — SARS-COV-2 RNA SPEC QL NAA+PROBE: DETECTED

## 2022-12-13 PROCEDURE — 99233 SBSQ HOSP IP/OBS HIGH 50: CPT

## 2022-12-13 RX ORDER — POTASSIUM CHLORIDE 20 MEQ
40 PACKET (EA) ORAL ONCE
Refills: 0 | Status: COMPLETED | OUTPATIENT
Start: 2022-12-13 | End: 2022-12-13

## 2022-12-13 RX ADMIN — Medication 40 MILLIEQUIVALENT(S): at 06:46

## 2022-12-13 RX ADMIN — PIPERACILLIN AND TAZOBACTAM 25 GRAM(S): 4; .5 INJECTION, POWDER, LYOPHILIZED, FOR SOLUTION INTRAVENOUS at 21:38

## 2022-12-13 RX ADMIN — ATORVASTATIN CALCIUM 40 MILLIGRAM(S): 80 TABLET, FILM COATED ORAL at 21:39

## 2022-12-13 RX ADMIN — Medication 100 MILLIGRAM(S): at 12:04

## 2022-12-13 RX ADMIN — CHLORHEXIDINE GLUCONATE 1 APPLICATION(S): 213 SOLUTION TOPICAL at 12:05

## 2022-12-13 RX ADMIN — PIPERACILLIN AND TAZOBACTAM 25 GRAM(S): 4; .5 INJECTION, POWDER, LYOPHILIZED, FOR SOLUTION INTRAVENOUS at 13:30

## 2022-12-13 RX ADMIN — PIPERACILLIN AND TAZOBACTAM 25 GRAM(S): 4; .5 INJECTION, POWDER, LYOPHILIZED, FOR SOLUTION INTRAVENOUS at 05:07

## 2022-12-13 RX ADMIN — ALBUTEROL 2 PUFF(S): 90 AEROSOL, METERED ORAL at 05:07

## 2022-12-13 RX ADMIN — Medication 400 MILLIGRAM(S): at 17:48

## 2022-12-13 RX ADMIN — Medication 400 MILLIGRAM(S): at 05:07

## 2022-12-13 RX ADMIN — Medication 1 TABLET(S): at 12:04

## 2022-12-13 RX ADMIN — ALBUTEROL 2 PUFF(S): 90 AEROSOL, METERED ORAL at 17:47

## 2022-12-13 RX ADMIN — APIXABAN 5 MILLIGRAM(S): 2.5 TABLET, FILM COATED ORAL at 17:47

## 2022-12-13 RX ADMIN — APIXABAN 5 MILLIGRAM(S): 2.5 TABLET, FILM COATED ORAL at 05:07

## 2022-12-13 NOTE — PROGRESS NOTE ADULT - SUBJECTIVE AND OBJECTIVE BOX
PROGRESS NOTE:   Melissa Ontiveros DO  Hospitalist  Pager [phone]  After 5pm/weekends or if no answer ext: [phone]      Patient is a 72y old  Male who presents with a chief complaint of SOB, Cough and sepsis (12 Dec 2022 10:27)      SUBJECTIVE / OVERNIGHT EVENTS:  Feeling well    ADDITIONAL REVIEW OF SYSTEMS:  no fever or chills  no n/v/d    MEDICATIONS  (STANDING):  acyclovir   Oral Tab/Cap 400 milliGRAM(s) Oral two times a day  albuterol    90 MICROgram(s) HFA Inhaler 2 Puff(s) Inhalation two times a day  allopurinol 100 milliGRAM(s) Oral daily  apixaban 5 milliGRAM(s) Oral two times a day  atorvastatin 40 milliGRAM(s) Oral at bedtime  chlorhexidine 2% Cloths 1 Application(s) Topical daily  guaiFENesin Oral Liquid (Sugar-Free) 200 milliGRAM(s) Oral once  multivitamin 1 Tablet(s) Oral daily  piperacillin/tazobactam IVPB.. 3.375 Gram(s) IV Intermittent every 8 hours    MEDICATIONS  (PRN):  acetaminophen     Tablet .. 650 milliGRAM(s) Oral every 6 hours PRN Temp greater or equal to 38C (100.4F), Mild Pain (1 - 3)  melatonin 3 milliGRAM(s) Oral at bedtime PRN Insomnia  ondansetron Injectable 4 milliGRAM(s) IV Push every 8 hours PRN Nausea and/or Vomiting      CAPILLARY BLOOD GLUCOSE        I&O's Summary    12 Dec 2022 07:01  -  13 Dec 2022 07:00  --------------------------------------------------------  IN: 590 mL / OUT: 300 mL / NET: 290 mL        PHYSICAL EXAM:  Vital Signs Last 24 Hrs  T(C): 36.4 (13 Dec 2022 04:27), Max: 36.6 (12 Dec 2022 20:49)  T(F): 97.6 (13 Dec 2022 04:27), Max: 97.8 (12 Dec 2022 20:49)  HR: 78 (13 Dec 2022 04:27) (78 - 86)  BP: 151/74 (13 Dec 2022 04:27) (120/65 - 151/74)  BP(mean): --  RR: 17 (13 Dec 2022 04:27) (17 - 18)  SpO2: 98% (13 Dec 2022 04:27) (98% - 99%)    Parameters below as of 13 Dec 2022 04:27  Patient On (Oxygen Delivery Method): room air        CONSTITUTIONAL: NAD, well-developed, non toxic appearing, euvolumic  RESPIRATORY: Normal respiratory effort; lungs are clear to auscultation bilaterally  CARDIOVASCULAR: Regular rate and rhythm, normal S1 and S2, no murmur/rub/gallop; No lower extremity edema; Peripheral pulses are 2+ bilaterally  ABDOMEN: Nontender to palpation, normoactive bowel sounds, no rebound/guarding; No hepatosplenomegaly  MUSCLOSKELETAL: no clubbing or cyanosis of digits; no joint swelling or tenderness to palpation  PSYCH: A+O to person, place, and time; affect appropriate    LABS:                        8.0    5.23  )-----------( 175      ( 11 Dec 2022 10:03 )             27.7     12-11    144  |  109<H>  |  20  ----------------------------<  85  3.5   |  23  |  1.03    Ca    8.8      11 Dec 2022 10:03    TPro  6.0  /  Alb  3.0<L>  /  TBili  0.8  /  DBili  x   /  AST  30  /  ALT  14  /  AlkPhos  107  12-11                RADIOLOGY & ADDITIONAL TESTS:  Results Reviewed:   Imaging Personally Reviewed:  Electrocardiogram Personally Reviewed:    COORDINATION OF CARE:  Care Discussed with Consultants/Other Providers [Y/N]:  Prior or Outpatient Records Reviewed [Y/N]:

## 2022-12-14 PROCEDURE — 99232 SBSQ HOSP IP/OBS MODERATE 35: CPT

## 2022-12-14 RX ADMIN — ALBUTEROL 2 PUFF(S): 90 AEROSOL, METERED ORAL at 17:06

## 2022-12-14 RX ADMIN — APIXABAN 5 MILLIGRAM(S): 2.5 TABLET, FILM COATED ORAL at 05:34

## 2022-12-14 RX ADMIN — ATORVASTATIN CALCIUM 40 MILLIGRAM(S): 80 TABLET, FILM COATED ORAL at 22:06

## 2022-12-14 RX ADMIN — APIXABAN 5 MILLIGRAM(S): 2.5 TABLET, FILM COATED ORAL at 17:05

## 2022-12-14 RX ADMIN — ALBUTEROL 2 PUFF(S): 90 AEROSOL, METERED ORAL at 05:34

## 2022-12-14 RX ADMIN — Medication 100 MILLIGRAM(S): at 12:24

## 2022-12-14 RX ADMIN — Medication 1 TABLET(S): at 12:24

## 2022-12-14 RX ADMIN — Medication 400 MILLIGRAM(S): at 05:34

## 2022-12-14 RX ADMIN — PIPERACILLIN AND TAZOBACTAM 25 GRAM(S): 4; .5 INJECTION, POWDER, LYOPHILIZED, FOR SOLUTION INTRAVENOUS at 05:33

## 2022-12-14 RX ADMIN — Medication 400 MILLIGRAM(S): at 17:06

## 2022-12-14 RX ADMIN — Medication 650 MILLIGRAM(S): at 12:24

## 2022-12-14 RX ADMIN — Medication 650 MILLIGRAM(S): at 15:41

## 2022-12-14 RX ADMIN — CHLORHEXIDINE GLUCONATE 1 APPLICATION(S): 213 SOLUTION TOPICAL at 12:24

## 2022-12-14 NOTE — PROGRESS NOTE ADULT - SUBJECTIVE AND OBJECTIVE BOX
PROGRESS NOTE:   Melissa Ontiveros DO  Hospitalist  Pager [phone]  After 5pm/weekends or if no answer ext: [phone]      Patient is a 72y old  Male who presents with a chief complaint of SOB, Cough and sepsis (13 Dec 2022 08:55)      SUBJECTIVE / OVERNIGHT EVENTS: ALISA, pt wants to go home    ADDITIONAL REVIEW OF SYSTEMS:  no fever or chills  no n/v/d    MEDICATIONS  (STANDING):  acyclovir   Oral Tab/Cap 400 milliGRAM(s) Oral two times a day  albuterol    90 MICROgram(s) HFA Inhaler 2 Puff(s) Inhalation two times a day  allopurinol 100 milliGRAM(s) Oral daily  apixaban 5 milliGRAM(s) Oral two times a day  atorvastatin 40 milliGRAM(s) Oral at bedtime  chlorhexidine 2% Cloths 1 Application(s) Topical daily  guaiFENesin Oral Liquid (Sugar-Free) 200 milliGRAM(s) Oral once  multivitamin 1 Tablet(s) Oral daily    MEDICATIONS  (PRN):  acetaminophen     Tablet .. 650 milliGRAM(s) Oral every 6 hours PRN Temp greater or equal to 38C (100.4F), Mild Pain (1 - 3)  melatonin 3 milliGRAM(s) Oral at bedtime PRN Insomnia  ondansetron Injectable 4 milliGRAM(s) IV Push every 8 hours PRN Nausea and/or Vomiting      CAPILLARY BLOOD GLUCOSE        I&O's Summary    13 Dec 2022 07:01  -  14 Dec 2022 07:00  --------------------------------------------------------  IN: 700 mL / OUT: 1000 mL / NET: -300 mL        PHYSICAL EXAM:  Vital Signs Last 24 Hrs  T(C): 36.5 (14 Dec 2022 04:06), Max: 36.5 (14 Dec 2022 04:06)  T(F): 97.7 (14 Dec 2022 04:06), Max: 97.7 (14 Dec 2022 04:06)  HR: 82 (14 Dec 2022 04:06) (82 - 93)  BP: 129/71 (14 Dec 2022 04:06) (102/57 - 129/71)  BP(mean): --  RR: 17 (14 Dec 2022 04:06) (17 - 18)  SpO2: 96% (14 Dec 2022 04:06) (94% - 97%)    Parameters below as of 14 Dec 2022 04:06  Patient On (Oxygen Delivery Method): room air        CONSTITUTIONAL: NAD, well-developed  RESPIRATORY: Normal respiratory effort; lungs are clear to auscultation bilaterally  CARDIOVASCULAR: Regular rate and rhythm, normal S1 and S2, no murmur/rub/gallop; No lower extremity edema; Peripheral pulses are 2+ bilaterally  ABDOMEN: Nontender to palpation, normoactive bowel sounds, no rebound/guarding; No hepatosplenomegaly  MUSCLOSKELETAL: no clubbing or cyanosis of digits; no joint swelling or tenderness to palpation  PSYCH: A+O to person, place     LABS:                      RADIOLOGY & ADDITIONAL TESTS:  Results Reviewed:   Imaging Personally Reviewed:  Electrocardiogram Personally Reviewed:    COORDINATION OF CARE:  Care Discussed with Consultants/Other Providers [Y/N]:  Prior or Outpatient Records Reviewed [Y/N]:

## 2022-12-15 ENCOUNTER — TRANSCRIPTION ENCOUNTER (OUTPATIENT)
Age: 72
End: 2022-12-15

## 2022-12-15 VITALS
OXYGEN SATURATION: 99 % | HEART RATE: 99 BPM | DIASTOLIC BLOOD PRESSURE: 67 MMHG | RESPIRATION RATE: 18 BRPM | SYSTOLIC BLOOD PRESSURE: 153 MMHG | TEMPERATURE: 98 F

## 2022-12-15 LAB — SARS-COV-2 RNA SPEC QL NAA+PROBE: DETECTED

## 2022-12-15 PROCEDURE — 99232 SBSQ HOSP IP/OBS MODERATE 35: CPT

## 2022-12-15 RX ADMIN — ALBUTEROL 2 PUFF(S): 90 AEROSOL, METERED ORAL at 05:44

## 2022-12-15 RX ADMIN — Medication 650 MILLIGRAM(S): at 07:20

## 2022-12-15 RX ADMIN — CHLORHEXIDINE GLUCONATE 1 APPLICATION(S): 213 SOLUTION TOPICAL at 12:14

## 2022-12-15 RX ADMIN — Medication 400 MILLIGRAM(S): at 05:44

## 2022-12-15 RX ADMIN — APIXABAN 5 MILLIGRAM(S): 2.5 TABLET, FILM COATED ORAL at 05:44

## 2022-12-15 NOTE — DISCHARGE NOTE NURSING/CASE MANAGEMENT/SOCIAL WORK - PATIENT PORTAL LINK FT
You can access the FollowMyHealth Patient Portal offered by [facility] by registering at the following website: [email or web address]. By joining [facility]’s FollowMyHealth portal, you will also be able to view your health information using other applications (apps) compatible with our system.

## 2022-12-15 NOTE — PROGRESS NOTE ADULT - PROBLEM SELECTOR PLAN 7
Relapsed DLBCL, currently being treated with O-COEP.   -Patient to follow up with Dr. Cordova at [facility]   -Reportedly did not get chemo scheduled for 11/11
Relapsed DLBCL, currently being treated with O-COEP.   -Patient to follow up with Dr. Cordova at [facility]   -Reportedly did not get chemo scheduled for 11/11
Relapsed DLBCL, currently being treated with O-COEP.   -Patient to follow up with Dr. Cordova at [facility]   -Reportedly did not get chemo scheduled for 11/11
Relapsed DLBCL, currently being treated with O-COEP.   -Patient to follow up with Dr. Cordova at [facility]   -Reportedly did not get chemo scheduled for 11/11
Relapsed DLBCL, currently being treated with O-COEP.   -Patient to follow up with Dr. Cordova at [facility]   -Reportedly did not get chemo scheduled for 11/11
Relapsed DLBCL, currently being treated with O-COEP.   -Patient to follow up with Dr. Cordova at [facility]   -Reportedly did not get chemo scheduled for 11/11
Relapsed DLBCL, currently being treated with O-COEP.   -Patient to follow up with Dr. Cordova at [facility]   -Reportedly did not get chemo scheduled for 11/11
Relapsed DLBCL, currently being treated with O-COEP.   -Patient to follow up with Dr. Cordova at [facility]   -Reportedly did not get chemo scheduled for 11/11
Relapsed DLBCL, currently being treated with O-COEP.   -Patient to follow up with Dr. Cordova at [facility]   -Reportedly did not get chemo scheduled for 11/11

## 2022-12-15 NOTE — CHART NOTE - NSCHARTNOTESELECT_GEN_ALL_CORE
Event Note
Hematology/Event Note
Nutrition Services
d/c appt/Event Note
Fall/Event Note
Hematology/Event Note
d/c appt/Event Note

## 2022-12-15 NOTE — PROGRESS NOTE ADULT - PROBLEM SELECTOR PLAN 2
Resolved  Note CTA chest findings. Could be residual radiographic findings from recent PNA but given frequent episodes of bacterial pna and pt comorbidities, will cont. to Rx gram negative PNA for now  -Complete IV Zosyn 12/12
Note CTA chest findings. Could be residual radiographic findings from recent PNA but given frequent episodes of bacterial pna and pt comorbidities, will cont. to Rx gram negative PNA for now  -Cont. IV Zosyn
Resolved  Note CTA chest findings. Could be residual radiographic findings from recent PNA but given frequent episodes of bacterial pna and pt comorbidities, will cont. to Rx gram negative PNA for now  -Complete IV Zosyn 12/12
Note CTA chest findings. Could be residual radiographic findings from recent PNA but given frequent episodes of bacterial pna and pt comorbidities, will cont. to Rx gram negative PNA for now  -Cont. IV Zosyn  -F/u procalcitonin  -Trend wbc and fever curve
Note CTA chest findings. Could be residual radiographic findings from recent PNA but given frequent episodes of bacterial pna and pt comorbidities, will cont. to Rx gram negative PNA for now  -Complete IV Zosyn 12/12
Resolved  Note CTA chest findings. Could be residual radiographic findings from recent PNA but given frequent episodes of bacterial pna and pt comorbidities, will cont. to Rx gram negative PNA for now  -Complete IV Zosyn 12/12
Note CTA chest findings. Could be residual radiographic findings from recent PNA but given frequent episodes of bacterial pna and pt comorbidities, will cont. to Rx gram negative PNA for now  -Cont. IV Zosyn  -F/u procalcitonin  -Trend wbc and fever curve
Note CTA chest findings. Could be residual radiographic findings from recent PNA but given frequent episodes of bacterial pna and pt comorbidities, will cont. to Rx gram negative PNA for now  -Cont. IV Zosyn
Note CTA chest findings. Could be residual radiographic findings from recent PNA but given frequent episodes of bacterial pna and pt comorbidities, will cont. to Rx gram negative PNA for now  -Cont. IV Zosyn  can transition to Levaquin upon discharge if he no longer needs hospitalization

## 2022-12-15 NOTE — PROGRESS NOTE ADULT - PROBLEM SELECTOR PLAN 4
Evaluated in November, no tapable pocket was located  -Cont. to monitor

## 2022-12-15 NOTE — PROGRESS NOTE ADULT - SUBJECTIVE AND OBJECTIVE BOX
PROGRESS NOTE:   Melissa Ontiveros DO  Hospitalist  Pager [phone]  After 5pm/weekends or if no answer ext: [phone]      Patient is a 72y old  Male who presents with a chief complaint of SOB, Cough and sepsis (14 Dec 2022 08:44)      SUBJECTIVE / OVERNIGHT EVENTS:  ALISA awaiting CARY    ADDITIONAL REVIEW OF SYSTEMS:  no fever or chills  no nvd    MEDICATIONS  (STANDING):  acyclovir   Oral Tab/Cap 400 milliGRAM(s) Oral two times a day  albuterol    90 MICROgram(s) HFA Inhaler 2 Puff(s) Inhalation two times a day  allopurinol 100 milliGRAM(s) Oral daily  apixaban 5 milliGRAM(s) Oral two times a day  atorvastatin 40 milliGRAM(s) Oral at bedtime  chlorhexidine 2% Cloths 1 Application(s) Topical daily  guaiFENesin Oral Liquid (Sugar-Free) 200 milliGRAM(s) Oral once  multivitamin 1 Tablet(s) Oral daily    MEDICATIONS  (PRN):  acetaminophen     Tablet .. 650 milliGRAM(s) Oral every 6 hours PRN Temp greater or equal to 38C (100.4F), Mild Pain (1 - 3)  melatonin 3 milliGRAM(s) Oral at bedtime PRN Insomnia  ondansetron Injectable 4 milliGRAM(s) IV Push every 8 hours PRN Nausea and/or Vomiting      CAPILLARY BLOOD GLUCOSE        I&O's Summary    14 Dec 2022 07:01  -  15 Dec 2022 07:00  --------------------------------------------------------  IN: 1020 mL / OUT: 600 mL / NET: 420 mL        PHYSICAL EXAM:  Vital Signs Last 24 Hrs  T(C): 36.4 (15 Dec 2022 04:33), Max: 36.5 (14 Dec 2022 11:29)  T(F): 97.6 (15 Dec 2022 04:33), Max: 97.7 (14 Dec 2022 11:29)  HR: 85 (15 Dec 2022 04:33) (85 - 98)  BP: 137/82 (15 Dec 2022 04:33) (101/58 - 154/76)  BP(mean): --  RR: 18 (15 Dec 2022 04:33) (18 - 18)  SpO2: 98% (15 Dec 2022 04:33) (98% - 99%)    Parameters below as of 15 Dec 2022 04:33  Patient On (Oxygen Delivery Method): room air        CONSTITUTIONAL: NAD, well-developed, non toxic   RESPIRATORY: Normal respiratory effort; lungs are clear to auscultation bilaterally  CARDIOVASCULAR: Regular rate and rhythm, normal S1 and S2, no murmur/rub/gallop; No lower extremity edema; Peripheral pulses are 2+ bilaterally  ABDOMEN: Nontender to palpation, normoactive bowel sounds, no rebound/guarding; No hepatosplenomegaly  MUSCLOSKELETAL: no clubbing or cyanosis of digits; no joint swelling or tenderness to palpation  PSYCH: A+O to person, place, and time; affect appropriate    LABS:                      RADIOLOGY & ADDITIONAL TESTS:  Results Reviewed:   Imaging Personally Reviewed:  Electrocardiogram Personally Reviewed:    COORDINATION OF CARE:  Care Discussed with Consultants/Other Providers [Y/N]:  Prior or Outpatient Records Reviewed [Y/N]:

## 2022-12-15 NOTE — PROGRESS NOTE ADULT - PROBLEM SELECTOR PROBLEM 4
Loculated pleural effusion

## 2022-12-15 NOTE — PROGRESS NOTE ADULT - PROBLEM SELECTOR PROBLEM 2
Gram-negative pneumonia

## 2022-12-15 NOTE — PROGRESS NOTE ADULT - PROBLEM SELECTOR PLAN 8
-Cont. Eliquis

## 2022-12-15 NOTE — PROGRESS NOTE ADULT - PROBLEM SELECTOR PLAN 1
resolved  sepsis present on admission due to pulmonary infection. COVID-19 +/- bacterial PNA.  blood Culture Results: No growth to date. (12.06.22 @ 16:45)
resolved  sepsis present on admission due to pulmonary infection. COVID-19 +/- bacterial PNA.  blood Culture Results: No growth to date. (12.06.22 @ 16:45)
resolving  sepsis present on admission due to pulmonary infection. COVID-19 +/- bacterial PNA.  blood Culture Results: No growth to date. (12.06.22 @ 16:45)
sepsis present on admission due to pulmonary infection. COVID-19 +/- bacterial PNA.  -F/u BCxs and UCx  blood Culture Results: No growth to date. (12.06.22 @ 16:45)    - ID eval appreciated --> will continue with treatment for presumed bacterial pneumonia as well.
resolved  sepsis present on admission due to pulmonary infection. COVID-19 +/- bacterial PNA.  blood Culture Results: No growth to date. (12.06.22 @ 16:45)
sepsis present on admission due to pulmonary infection. COVID-19 +/- bacterial PNA.  -F/u BCxs and UCx  blood Culture Results: No growth to date. (12.06.22 @ 16:45)    - ID eval appreciated --> will continue with treatment for presumed bacterial pneumonia as well.
sepsis present on admission due to pulmonary infection. COVID-19 +/- bacterial PNA.  -F/u BCxs and UCx  blood Culture Results: No growth to date. (12.06.22 @ 16:45)    - ID eval appreciated --> will continue with treatment for presumed bacterial pneumonia as well.
resolving  sepsis present on admission due to pulmonary infection. COVID-19 +/- bacterial PNA.  -F/u BCxs and UCx  blood Culture Results: No growth to date. (12.06.22 @ 16:45)    - ID eval appreciated --> will continue with treatment for presumed bacterial pneumonia as well.
sepsis present on admission due to pulmonary infection. COVID-19 +/- bacterial PNA.  -Given infection and elevated BNP will watch off additional IVF for now  -F/u BCxs and UCx  - ID eval appreciated --> will continue with treatment for presumed bacterial pneumonia as well.

## 2022-12-15 NOTE — DISCHARGE NOTE NURSING/CASE MANAGEMENT/SOCIAL WORK - NSDCFUADDAPPT_GEN_ALL_CORE_FT
APPTS ARE READY TO BE MADE: [x] YES    Best Family or Patient Contact (if needed):  Daughter is best to coordinate with [phone]    Additional Information about above appointments (if needed):    1: [place] or any neurologist asap  2:   3:     Other comments or requests:     Patient was previously scheduled with Toby Harkins on 12/27 2pm at [address] [place] [address]

## 2022-12-15 NOTE — PROGRESS NOTE ADULT - REASON FOR ADMISSION
SOB, Cough and sepsis

## 2022-12-15 NOTE — PROGRESS NOTE ADULT - PROVIDER SPECIALTY LIST ADULT
Hospitalist
Infectious Disease
Hospitalist
Hospitalist
Infectious Disease
Infectious Disease
Hospitalist

## 2022-12-15 NOTE — PROGRESS NOTE ADULT - PROBLEM SELECTOR PROBLEM 6
Paroxysmal atrial fibrillation

## 2022-12-15 NOTE — PROGRESS NOTE ADULT - ASSESSMENT
71 yo M A Fib, HTN, CHB PPM, DLBCL, initially with cough/sob  Fever, Leukopenia  Last chemo was end of Oct--Nov chemo delayed due to encephalopathy/PNA  Presents with cough/sob  COVID+  CT with multiple consolidative opacities through lungs  Note that above CT findings were demonstrated on prior CT, but some new areas of consolidations--mixed picture with some areas improved, some worsened?  Difficult to determine COVID vs bacterial process (or superimposed), but given immunocompromise, favor treating  Overall,  1) COVID  - RA  - S/p RemD  - Hold on dexa unless progressive O2 requirements  - Supportive care  - O2 supplementation per team  2) Abnormal finding on imaging/PNA  - Areas of worsening on CT chest, given immunocompromise, would not presume is COVID alone  - Zosyn 3.375g q 8, when DC planning can send out on PO Ceftin 500mg q 12 to complete 7 days total (including Zosyn days)  - Monitor resp status  3) Leukopenia  - Due to COVID vs underlying malignancy?  - Trend to normal  - F/U Heme Onc eval    Signing off. Please call with further questions or change in status.    Christiano Ny MD  Contact on TEAMS messaging from 9am - 5pm  From 5pm-9am, on weekends, or if no response call [phone]
72 year old male with afib/ aflutter (on eliquis), HTN,  complete heart block s/p PPM, HLD, HFrEF (30% in 09/2022), and DLBCL (tx with R-CHOP in 2003, with relapse in 2009 treated with BR) now with recently diagnosed grade 3 follicular lymphoma last chemo in October p/w cough and SOB found to have sepsis 2/2 COVID-19 infection and possibly bacterial PNA
73 yo M A Fib, HTN, CHB PPM, DLBCL, initially with cough/sob  Fever, Leukopenia  Last chemo was end of Oct--Nov chemo delayed due to encephalopathy/PNA  Presents with cough/sob  COVID+  CT with multiple consolidative opacities through lungs  Note that above CT findings were demonstrated on prior CT, but some new areas of consolidations--mixed picture with some areas improved, some worsened?  Difficult to determine COVID vs bacterial process (or superimposed), but given immunocompromise, favor treating  Overall,  1) COVID  - RA  - RemD 5 days then DC--monitor Cr/LFTs  - Hold on dexa unless progressive O2 requirements  - Supportive care  - O2 supplementation per team  2) Abnormal finding on imaging  - Areas of worsening on CT chest, given immunocompromise, would not presume is COVID alone  - Zosyn 3.375g q 8, 7 day course planned (consider PO if clinically improved and DC planning)  - Monitor resp status  - If producing sputum, check sputum culture  3) Leukopenia  - Due to COVID vs underlying malignancy?  - Trend to normal  - F/U Heme Onc eval    Christiano Ny MD  Contact on TEAMS messaging from 9am - 5pm  From 5pm-9am, on weekends, or if no response call [phone]
71 yo M A Fib, HTN, CHB PPM, DLBCL, initially with cough/sob  Fever, Leukopenia  Last chemo was end of Oct--Nov chemo delayed due to encephalopathy/PNA  Presents with cough/sob  COVID+  CT with multiple consolidative opacities through lungs  Note that above CT findings were demonstrated on prior CT, but some new areas of consolidations--mixed picture with some areas improved, some worsened?  Difficult to determine COVID vs bacterial process (or superimposed), but given immunocompromise, favor treating  Overall,  1) COVID  - RA  - RemD 5 days then DC--monitor Cr/LFTs  - Hold on dexa unless progressive O2 requirements  - Supportive care  - O2 supplementation per team  2) Abnormal finding on imaging  - Areas of worsening on CT chest, given immunocompromise, would not presume is COVID alone  - Zosyn 3.375g q 8, 7 day course planned (consider PO if clinically improved and DC planning)  - Monitor resp status  - If producing sputum, check sputum culture  3) Leukopenia  - Due to COVID vs underlying malignancy?  - Trend to normal  - F/U Heme Onc eval    Christiano Ny MD  Contact on TEAMS messaging from 9am - 5pm  From 5pm-9am, on weekends, or if no response call [phone]
72 year old male with afib/ aflutter (on eliquis), HTN,  complete heart block s/p PPM, HLD, HFrEF (30% in 09/2022), and DLBCL (tx with R-CHOP in 2003, with relapse in 2009 treated with BR) now with recently diagnosed grade 3 follicular lymphoma last chemo in October p/w cough and SOB found to have sepsis 2/2 COVID-19 infection and possibly bacterial PNA.
72 year old male with afib/ aflutter (on eliquis), HTN,  complete heart block s/p PPM, HLD, HFrEF (30% in 09/2022), and DLBCL (tx with R-CHOP in 2003, with relapse in 2009 treated with BR) now with recently diagnosed grade 3 follicular lymphoma last chemo in October p/w cough and SOB found to have sepsis 2/2 COVID-19 infection and possibly bacterial PNA
72 year old male with afib/ aflutter (on eliquis), HTN,  complete heart block s/p PPM, HLD, HFrEF (30% in 09/2022), and DLBCL (tx with R-CHOP in 2003, with relapse in 2009 treated with BR) now with recently diagnosed grade 3 follicular lymphoma last chemo in October p/w cough and SOB found to have sepsis 2/2 COVID-19 infection and possibly bacterial PNA
72 year old male with afib/ aflutter (on eliquis), HTN,  complete heart block s/p PPM, HLD, HFrEF (30% in 09/2022), and DLBCL (tx with R-CHOP in 2003, with relapse in 2009 treated with BR) now with recently diagnosed grade 3 follicular lymphoma last chemo in October p/w cough and SOB found to have sepsis 2/2 COVID-19 infection and possibly bacterial PNA.
72 year old male with afib/ aflutter (on eliquis), HTN,  complete heart block s/p PPM, HLD, HFrEF (30% in 09/2022), and DLBCL (tx with R-CHOP in 2003, with relapse in 2009 treated with BR) now with recently diagnosed grade 3 follicular lymphoma last chemo in October p/w cough and SOB found to have sepsis 2/2 COVID-19 infection and possibly bacterial PNA
72 year old male with afib/ aflutter (on eliquis), HTN,  complete heart block s/p PPM, HLD, HFrEF (30% in 09/2022), and DLBCL (tx with R-CHOP in 2003, with relapse in 2009 treated with BR) now with recently diagnosed grade 3 follicular lymphoma last chemo in October p/w cough and SOB found to have sepsis 2/2 COVID-19 infection and possibly bacterial PNA.
72 year old male with afib/ aflutter (on eliquis), HTN,  complete heart block s/p PPM, HLD, HFrEF (30% in 09/2022), and DLBCL (tx with R-CHOP in 2003, with relapse in 2009 treated with BR) now with recently diagnosed grade 3 follicular lymphoma last chemo in October p/w cough and SOB found to have sepsis 2/2 COVID-19 infection and possibly bacterial PNA
72 year old male with afib/ aflutter (on eliquis), HTN,  complete heart block s/p PPM, HLD, HFrEF (30% in 09/2022), and DLBCL (tx with R-CHOP in 2003, with relapse in 2009 treated with BR) now with recently diagnosed grade 3 follicular lymphoma last chemo in October p/w cough and SOB found to have sepsis 2/2 COVID-19 infection and possibly bacterial PNA

## 2022-12-15 NOTE — PROGRESS NOTE ADULT - PROBLEM SELECTOR PLAN 3
COVID positive on swab. Oxygenating 100% on RA at rest on my examination.  -completed Remdesivir  -Trend hepatic and renal function  -Supportive care  -Quarantine 12/6-12/12 the mask around others until 12/17
COVID positive on swab. Oxygenating 100% on RA at rest on my examination.  -completed Remdesivir  -Trend hepatic and renal function  -Supportive care
COVID positive on swab. Oxygenating 100% on RA at rest on my examination.  -Will order Isolation precautions for now  -c/w Remdesivir  -Trend hepatic and renal function  -Supportive care
COVID positive on swab. Oxygenating 100% on RA at rest on my examination.  -c/w Remdesivir  -Trend hepatic and renal function  -Supportive care
COVID positive on swab. Oxygenating 100% on RA at rest on my examination.  -completed Remdesivir  -Trend hepatic and renal function  -Supportive care  -Quarantine 12/6-12/12 the mask around others until 12/17
COVID positive on swab. Oxygenating 100% on RA at rest on my examination.  -c/w Remdesivir  -Trend hepatic and renal function  -Supportive care
COVID positive on swab. Oxygenating 100% on RA at rest on my examination.  -c/w Remdesivir  -Trend hepatic and renal function  -Supportive care
COVID positive on swab. Oxygenating 100% on RA at rest on my examination.  -completed Remdesivir  -Trend hepatic and renal function  -Supportive care  -Quarantine 12/6-12/12 the mask around others until 12/17
COVID positive on swab. Oxygenating 100% on RA at rest on my examination.  -c/w Remdesivir  -Trend hepatic and renal function  -Supportive care

## 2022-12-15 NOTE — PROGRESS NOTE ADULT - NUTRITIONAL ASSESSMENT
This patient has been assessed with a concern for Malnutrition and has been determined to have a diagnosis/diagnoses of Severe protein-calorie malnutrition and Underweight (BMI < 19).    This patient is being managed with:   Diet Regular-  Easy to Chew (EASYTOCHEW)  Supplement Feeding Modality:  Oral  Ensure Clear Cans or Servings Per Day:  2       Frequency:  Daily  Entered: Dec  9 2022 12:44PM    

## 2022-12-15 NOTE — CHART NOTE - NSCHARTNOTEFT_GEN_A_CORE
Discussed with patient's Hematologist, Dr. Cordova.  Patient is s/p 3 cycles of Obinutuzumab + modified mini-COEP (last treatment 10/21-10/24) c/b encephalopathy/PNA holding further treatments. He was to be considered for Obinutuzumab + Revlimid in 28-day cycles after having a PET scan, which was scheduled for 12/6/22 on the day of admission. However, due to patient's acute symptoms (now found to be + for COVID-19), he came to the hospital ED first.    Given COVID, PET scan findings will be unreliable, so will not be able to get it for some time, probably 1-2 months.  No plans for treatment at this time.  Given neutropenia, will start Neupogen 300 ucg QD until ANC > 1000 x at least 2 days.  Please check diff with CBC every day.  Hematology will continue to follow.    Aryles Hedjar, MD, PGY-5  Hematology/Oncology Fellow  [facility]  Pager: [phone]  After 5PM and on weekends and holidays, please call the inpatient fellow on call.
Left a message for patient in regards to follow up care with callback information.
MEDICINE PA     Post Fall Assessment    EVENT SUMMARY   Notified by RN for patient s/p  fall. Pt seen and examined at bedside. Pt states did not hit head, just slipped from the edge of the bed and landed on his buttocks. Pt has no complaints at this time. Denies SOB, CP, palpitations, dizziness, LOC, hip / back pain.       Vital Signs Last 24 Hrs  T(C): 38.1 (09 Dec 2022 23:00), Max: 38.1 (09 Dec 2022 23:00)  T(F): 100.5 (09 Dec 2022 23:00), Max: 100.5 (09 Dec 2022 23:00)  HR: 65 (09 Dec 2022 23:00) (65 - 99)  BP: 147/50 (09 Dec 2022 23:00) (122/69 - 147/50)  BP(mean): --  RR: 18 (09 Dec 2022 23:00) (17 - 18)  SpO2: 95% (09 Dec 2022 23:00) (94% - 98%)    Parameters below as of 09 Dec 2022 23:00  Patient On (Oxygen Delivery Method): room air        Physical Exam  General: NAD, resting comfortably in   Mental Status: A&O x3  Skin: Warm, dry, no rashes, lesions, ecchymosis, bruises   Head: NCAT  Neuro: Non focal  Cardiac: S1/S2. No murmus appreciated  Lungs: CTA b/l. No rales, wheezes, rhonchi appreciated b/l.   Abdomen: Soft, +BS, non distended  Extremities: No edema. Full ROM all 4 extremities.     Pt ambulating appropriately with no vertebral / hip tenderness.     HPI:  Pt awake and alert but too fatigue to respond meaningfully to questions. Daughter provided most of collateral    72 year old male with afib/ aflutter (on eliquis), HTN,  complete heart block s/p PPM, HLD, HFrEF (30% in 09/2022), and DLBCL (tx with R-CHOP in 2003, with relapse in 2009 treated with BR) now with recently diagnosed grade 3 follicular lymphoma last chemo in October p/w cough and SOB. Pt with recent admission to [facility] for pneumonia in November and eventually discharged to rehab. Pt returned home November 26th from rehab somewhat more deconditioned than prior but with no acute complaints. Over the past 2 days pt developed a significant cough associated with ARGUETA and SOB. Today, when pt's cough became so significant he coughed out his dentures daughter became concerned enough to bring him to hospital. Daughter checks pulse oximetry regularly at home and has not dropped below 95. Pt denies any chest pain, SOB, dizziness or palpitations right now.    In ER: Given IV Zosyn, asa 324mg, IV vancomycin, , Tylenol 1gm IVPB, Lasix 40mg IV (06 Dec 2022 21:16)      Assessment:  Patient is a 72y old  Male who presents with a chief complaint of SOB, Cough and sepsis, seen for a fall with no acute complaints  1) s/p fall  - Bed alarm  - fall precautions   - Continue to monitor   - Will discuss case with primary team in AM    Makayla Morrison PA-C  Department of Medicine  Spectralink #[phone]
Left 1 message for patient in regards to follow up care with callback information.
Left a message for patient in regards to follow up care with callback information.
Nutrition Follow Up Note  Patient seen for: 1st malnutrition follow up     Chart reviewed, events noted.    Source: [X] Patient       [x] EMR        [] RN        [] Family at bedside       [] Other:    -If unable to interview patient: [] Trach/Vent/BiPAP  [] Disoriented/confused/inappropriate to interview    Diet Order:   Diet, Regular:   Easy to Chew (EASYTOCHEW)  Supplement Feeding Modality:  Oral  Ensure Clear Cans or Servings Per Day:  2       Frequency:  Daily (12-09-22)    - Is current order appropriate/adequate? [X] Yes  []  No:     - PO intake :   [] >75%  Adequate    [X] 50-75%  Fair       [] <50%  Poor    - Nutrition-related concerns:      - Noted with variable PO intakes per flow sheets, %         GI:  Last BM  12/12/22  Bowel Regimen? [] Yes   [X] No    Weights: Drug Dosing Weight: 56.7 kg/ 125 pounds  (06 Dec 2022 15:12); BMI (kg/m2): 16.8 (06 Dec 2022 15:12)  Monitor Weight status as able during present admission     Nutritionally Pertinent MEDICATIONS  (STANDING):  acyclovir   Oral Tab/Cap  allopurinol  atorvastatin  multivitamin  piperacillin/tazobactam IVPB..    Pertinent Labs:   A1C with Estimated Average Glucose Result: 6.2 % (10-29-22 @ 05:59)  A1C with Estimated Average Glucose Result: 5.4 % (08-29-22 @ 05:55)    Skin per nursing documentation: bilateral heal stage 1 Pressure Injury   Edema:  No edema noted     Estimated Needs:   [X] no change since previous assessment  [] recalculated:     Previous Nutrition Diagnosis:   1. Severe chronic malnutrition and underweight   2. Increased Nutrient Needs.  Nutrition Diagnosis is: [X] ongoing  [] resolved [] not applicable     New Nutrition Diagnosis: [X] Not applicable    Nutrition Care Plan:  [X] In Progress- addressed with diet order, PO encouragement and nutritional supplements   [] Achieved  [] Not applicable    Nutrition Interventions:     Education Provided:       [] Yes:  [X] No:        Recommendations:      1) Recommend  to continue easy to chew, regular diet. Defer diet/texture advancement to medical team/SLP as indicated   2) Continue Ensure Clear 2x/day (240 kcal, 8 g Pro/8oz)  3) Continue MVI daily to optimize nutrition status  4) Continue mighty shakes 2x/day to provide additional calories/protein   5) Consider appetite stimulant if PO intake does not improve/medically appropriate    Monitoring and Evaluation:   Continue to monitor nutritional intake, tolerance to diet prescription, weights, labs, skin integrity    RD remains available upon request and will follow up per protocol  Ning Pierre MS,RDN,CDN Pager #[phone] or TEAMS
Patient's ANC is > 1K for 2 days. Will give one more dose of Neupogen today and stop.    Aryles Hedjar, MD, PGY-5  Hematology/Oncology Fellow  [facility]  Pager: [phone]  After 5PM and on weekends and holidays, please call the inpatient fellow on call.

## 2022-12-15 NOTE — PROGRESS NOTE ADULT - PROBLEM SELECTOR PLAN 6
-Cont. Eliquis BID

## 2022-12-15 NOTE — PROGRESS NOTE ADULT - PROBLEM SELECTOR PLAN 5
11/2022 TTE w/ EF 30-35%.   -Daughter states pt now off Entresto, metoprolol and spironolactone will confirm no diuretics, not on oxygen  -Strict I/Os and daily weights  -Has cardio f/u with Dr. Don for review of this and I emailed him to notify him of these changes
11/2022 TTE w/ EF 30-35%.   -Daughter states pt now off Entresto, metoprolol and spironolactone  -Strict I/Os and daily weights
11/2022 TTE w/ EF 30-35%.   -Daughter states pt now off Entresto, metoprolol and spironolactone  -Strict I/Os and daily weights
11/2022 TTE w/ EF 30-35%.   -Daughter states pt now off Entresto, metoprolol and spironolactone will confirm no diuretics, not on oxygen  -Strict I/Os and daily weights  -Has cardio f/u with Dr. Don for review of this and I emailed him to notify him of these changes
11/2022 TTE w/ EF 30-35%.   -Daughter states pt now off Entresto, metoprolol and spironolactone  -Strict I/Os and daily weights
11/2022 TTE w/ EF 30-35%.   -Daughter states pt now off Entresto, metoprolol and spironolactone will confirm no diuretics, not on oxygen  -Strict I/Os and daily weights
11/2022 TTE w/ EF 30-35%.   -Daughter states pt now off Entresto, metoprolol and spironolactone will confirm no diuretics, not on oxygen  -Strict I/Os and daily weights  -Has cardio f/u with Dr. Don for review of this
11/2022 TTE w/ EF 30-35%.   -Daughter states pt now off Entresto, metoprolol and spironolactone  -Strict I/Os and daily weights
11/2022 TTE w/ EF 30-35%.   -Daughter states pt now off Entresto, metoprolol and spironolactone  -Strict I/Os and daily weights

## 2022-12-19 ENCOUNTER — NON-APPOINTMENT (OUTPATIENT)
Age: 72
End: 2022-12-19

## 2022-12-21 ENCOUNTER — OUTPATIENT (OUTPATIENT)
Dept: OUTPATIENT SERVICES | Facility: HOSPITAL | Age: 72
LOS: 1 days | Discharge: HOME | End: 2022-12-21

## 2022-12-21 DIAGNOSIS — C85.90 NON-HODGKIN LYMPHOMA, UNSPECIFIED, UNSPECIFIED SITE: Chronic | ICD-10-CM

## 2022-12-21 DIAGNOSIS — Z95.0 PRESENCE OF CARDIAC PACEMAKER: Chronic | ICD-10-CM

## 2022-12-21 DIAGNOSIS — C85.90 NON-HODGKIN LYMPHOMA, UNSPECIFIED, UNSPECIFIED SITE: ICD-10-CM

## 2022-12-23 ENCOUNTER — APPOINTMENT (OUTPATIENT)
Dept: INFUSION THERAPY | Facility: HOSPITAL | Age: 72
End: 2022-12-23

## 2022-12-26 ENCOUNTER — NON-APPOINTMENT (OUTPATIENT)
Age: 72
End: 2022-12-26

## 2022-12-27 ENCOUNTER — APPOINTMENT (OUTPATIENT)
Dept: INFUSION THERAPY | Facility: HOSPITAL | Age: 72
End: 2022-12-27

## 2022-12-27 ENCOUNTER — APPOINTMENT (OUTPATIENT)
Dept: NEUROLOGY | Facility: CLINIC | Age: 72
End: 2022-12-27

## 2022-12-30 ENCOUNTER — APPOINTMENT (OUTPATIENT)
Dept: INFUSION THERAPY | Facility: HOSPITAL | Age: 72
End: 2022-12-30

## 2022-12-30 ENCOUNTER — APPOINTMENT (OUTPATIENT)
Dept: HEMATOLOGY ONCOLOGY | Facility: CLINIC | Age: 72
End: 2022-12-30

## 2023-01-04 PROCEDURE — 85025 COMPLETE CBC W/AUTO DIFF WBC: CPT

## 2023-01-04 PROCEDURE — 36415 COLL VENOUS BLD VENIPUNCTURE: CPT

## 2023-01-04 PROCEDURE — 87641 MR-STAPH DNA AMP PROBE: CPT

## 2023-01-04 PROCEDURE — 84100 ASSAY OF PHOSPHORUS: CPT

## 2023-01-04 PROCEDURE — 85018 HEMOGLOBIN: CPT

## 2023-01-04 PROCEDURE — 82803 BLOOD GASES ANY COMBINATION: CPT

## 2023-01-04 PROCEDURE — U0005: CPT

## 2023-01-04 PROCEDURE — 87640 STAPH A DNA AMP PROBE: CPT

## 2023-01-04 PROCEDURE — 97162 PT EVAL MOD COMPLEX 30 MIN: CPT

## 2023-01-04 PROCEDURE — 71045 X-RAY EXAM CHEST 1 VIEW: CPT

## 2023-01-04 PROCEDURE — 97110 THERAPEUTIC EXERCISES: CPT

## 2023-01-04 PROCEDURE — 82947 ASSAY GLUCOSE BLOOD QUANT: CPT

## 2023-01-04 PROCEDURE — 80053 COMPREHEN METABOLIC PANEL: CPT

## 2023-01-04 PROCEDURE — 84132 ASSAY OF SERUM POTASSIUM: CPT

## 2023-01-04 PROCEDURE — 80048 BASIC METABOLIC PNL TOTAL CA: CPT

## 2023-01-04 PROCEDURE — 97116 GAIT TRAINING THERAPY: CPT

## 2023-01-04 PROCEDURE — 85014 HEMATOCRIT: CPT

## 2023-01-04 PROCEDURE — 85027 COMPLETE CBC AUTOMATED: CPT

## 2023-01-04 PROCEDURE — 96374 THER/PROPH/DIAG INJ IV PUSH: CPT

## 2023-01-04 PROCEDURE — U0003: CPT

## 2023-01-04 PROCEDURE — 83880 ASSAY OF NATRIURETIC PEPTIDE: CPT

## 2023-01-04 PROCEDURE — 82330 ASSAY OF CALCIUM: CPT

## 2023-01-04 PROCEDURE — 84145 PROCALCITONIN (PCT): CPT

## 2023-01-04 PROCEDURE — 85730 THROMBOPLASTIN TIME PARTIAL: CPT

## 2023-01-04 PROCEDURE — 0225U NFCT DS DNA&RNA 21 SARSCOV2: CPT

## 2023-01-04 PROCEDURE — 82435 ASSAY OF BLOOD CHLORIDE: CPT

## 2023-01-04 PROCEDURE — 84484 ASSAY OF TROPONIN QUANT: CPT

## 2023-01-04 PROCEDURE — 85610 PROTHROMBIN TIME: CPT

## 2023-01-04 PROCEDURE — 97530 THERAPEUTIC ACTIVITIES: CPT

## 2023-01-04 PROCEDURE — 84295 ASSAY OF SERUM SODIUM: CPT

## 2023-01-04 PROCEDURE — 94640 AIRWAY INHALATION TREATMENT: CPT

## 2023-01-04 PROCEDURE — 87040 BLOOD CULTURE FOR BACTERIA: CPT

## 2023-01-04 PROCEDURE — 99285 EMERGENCY DEPT VISIT HI MDM: CPT

## 2023-01-04 PROCEDURE — 83735 ASSAY OF MAGNESIUM: CPT

## 2023-01-04 PROCEDURE — 83605 ASSAY OF LACTIC ACID: CPT

## 2023-01-04 PROCEDURE — 96375 TX/PRO/DX INJ NEW DRUG ADDON: CPT

## 2023-01-04 PROCEDURE — 71275 CT ANGIOGRAPHY CHEST: CPT | Mod: MA

## 2023-01-07 ENCOUNTER — APPOINTMENT (OUTPATIENT)
Dept: NUCLEAR MEDICINE | Facility: IMAGING CENTER | Age: 73
End: 2023-01-07

## 2023-01-11 ENCOUNTER — NON-APPOINTMENT (OUTPATIENT)
Age: 73
End: 2023-01-11

## 2023-01-12 ENCOUNTER — APPOINTMENT (OUTPATIENT)
Dept: ELECTROPHYSIOLOGY | Facility: CLINIC | Age: 73
End: 2023-01-12

## 2023-01-12 ENCOUNTER — FORM ENCOUNTER (OUTPATIENT)
Age: 73
End: 2023-01-12

## 2023-01-16 ENCOUNTER — APPOINTMENT (OUTPATIENT)
Dept: CARDIOLOGY | Facility: CLINIC | Age: 73
End: 2023-01-16

## 2023-01-17 ENCOUNTER — OUTPATIENT (OUTPATIENT)
Dept: OUTPATIENT SERVICES | Facility: HOSPITAL | Age: 73
LOS: 1 days | Discharge: HOME | End: 2023-01-17

## 2023-01-17 DIAGNOSIS — Z95.0 PRESENCE OF CARDIAC PACEMAKER: Chronic | ICD-10-CM

## 2023-01-17 DIAGNOSIS — C82.20 FOLLICULAR LYMPHOMA GRADE III, UNSPECIFIED, UNSPECIFIED SITE: ICD-10-CM

## 2023-01-17 DIAGNOSIS — C85.90 NON-HODGKIN LYMPHOMA, UNSPECIFIED, UNSPECIFIED SITE: Chronic | ICD-10-CM

## 2023-01-18 ENCOUNTER — APPOINTMENT (OUTPATIENT)
Dept: HEMATOLOGY ONCOLOGY | Facility: CLINIC | Age: 73
End: 2023-01-18

## 2023-01-20 ENCOUNTER — APPOINTMENT (OUTPATIENT)
Dept: INFUSION THERAPY | Facility: HOSPITAL | Age: 73
End: 2023-01-20

## 2023-01-23 ENCOUNTER — NON-APPOINTMENT (OUTPATIENT)
Age: 73
End: 2023-01-23

## 2023-02-01 ENCOUNTER — APPOINTMENT (OUTPATIENT)
Dept: NUCLEAR MEDICINE | Facility: IMAGING CENTER | Age: 73
End: 2023-02-01
Payer: MEDICARE

## 2023-02-01 ENCOUNTER — OUTPATIENT (OUTPATIENT)
Dept: OUTPATIENT SERVICES | Facility: HOSPITAL | Age: 73
LOS: 1 days | End: 2023-02-01
Payer: MEDICARE

## 2023-02-01 ENCOUNTER — APPOINTMENT (OUTPATIENT)
Dept: CARDIOLOGY | Facility: CLINIC | Age: 73
End: 2023-02-01

## 2023-02-01 DIAGNOSIS — C85.90 NON-HODGKIN LYMPHOMA, UNSPECIFIED, UNSPECIFIED SITE: Chronic | ICD-10-CM

## 2023-02-01 DIAGNOSIS — C82.20 FOLLICULAR LYMPHOMA GRADE III, UNSPECIFIED, UNSPECIFIED SITE: ICD-10-CM

## 2023-02-01 DIAGNOSIS — Z95.0 PRESENCE OF CARDIAC PACEMAKER: Chronic | ICD-10-CM

## 2023-02-01 PROCEDURE — 78815 PET IMAGE W/CT SKULL-THIGH: CPT | Mod: 26,PS,MH

## 2023-02-01 PROCEDURE — A9552: CPT

## 2023-02-01 PROCEDURE — 78815 PET IMAGE W/CT SKULL-THIGH: CPT

## 2023-02-06 ENCOUNTER — APPOINTMENT (OUTPATIENT)
Dept: HEMATOLOGY ONCOLOGY | Facility: CLINIC | Age: 73
End: 2023-02-06
Payer: MEDICARE

## 2023-02-06 ENCOUNTER — RESULT REVIEW (OUTPATIENT)
Age: 73
End: 2023-02-06

## 2023-02-06 VITALS
DIASTOLIC BLOOD PRESSURE: 57 MMHG | TEMPERATURE: 97.2 F | SYSTOLIC BLOOD PRESSURE: 116 MMHG | HEART RATE: 97 BPM | BODY MASS INDEX: 24.02 KG/M2 | WEIGHT: 135.56 LBS | OXYGEN SATURATION: 97 % | RESPIRATION RATE: 20 BRPM

## 2023-02-06 LAB
BASOPHILS # BLD AUTO: 0.04 K/UL — SIGNIFICANT CHANGE UP (ref 0–0.2)
BASOPHILS NFR BLD AUTO: 1.1 % — SIGNIFICANT CHANGE UP (ref 0–2)
EOSINOPHIL # BLD AUTO: 0.23 K/UL — SIGNIFICANT CHANGE UP (ref 0–0.5)
EOSINOPHIL NFR BLD AUTO: 6.3 % — HIGH (ref 0–6)
HCT VFR BLD CALC: 31.4 % — LOW (ref 39–50)
HGB BLD-MCNC: 9.8 G/DL — LOW (ref 13–17)
IMM GRANULOCYTES NFR BLD AUTO: 0.8 % — SIGNIFICANT CHANGE UP (ref 0–0.9)
LYMPHOCYTES # BLD AUTO: 0.39 K/UL — LOW (ref 1–3.3)
LYMPHOCYTES # BLD AUTO: 10.7 % — LOW (ref 13–44)
MCHC RBC-ENTMCNC: 29.3 PG — SIGNIFICANT CHANGE UP (ref 27–34)
MCHC RBC-ENTMCNC: 31.2 G/DL — LOW (ref 32–36)
MCV RBC AUTO: 94 FL — SIGNIFICANT CHANGE UP (ref 80–100)
MONOCYTES # BLD AUTO: 0.56 K/UL — SIGNIFICANT CHANGE UP (ref 0–0.9)
MONOCYTES NFR BLD AUTO: 15.4 % — HIGH (ref 2–14)
NEUTROPHILS # BLD AUTO: 2.39 K/UL — SIGNIFICANT CHANGE UP (ref 1.8–7.4)
NEUTROPHILS NFR BLD AUTO: 65.7 % — SIGNIFICANT CHANGE UP (ref 43–77)
NRBC # BLD: 0 /100 WBCS — SIGNIFICANT CHANGE UP (ref 0–0)
PLATELET # BLD AUTO: 146 K/UL — LOW (ref 150–400)
RBC # BLD: 3.34 M/UL — LOW (ref 4.2–5.8)
RBC # FLD: 19.3 % — HIGH (ref 10.3–14.5)
WBC # BLD: 3.64 K/UL — LOW (ref 3.8–10.5)
WBC # FLD AUTO: 3.64 K/UL — LOW (ref 3.8–10.5)

## 2023-02-06 PROCEDURE — 99215 OFFICE O/P EST HI 40 MIN: CPT

## 2023-02-06 RX ORDER — SACUBITRIL AND VALSARTAN 49; 51 MG/1; MG/1
49-51 TABLET, FILM COATED ORAL DAILY
Refills: 0 | Status: DISCONTINUED | COMMUNITY
Start: 2022-10-17 | End: 2023-02-06

## 2023-02-06 RX ORDER — ETOPOSIDE 50 MG/1
50 CAPSULE ORAL DAILY
Qty: 4 | Refills: 3 | Status: DISCONTINUED | COMMUNITY
Start: 2022-09-29 | End: 2023-02-06

## 2023-02-06 RX ORDER — PREDNISONE 50 MG/1
50 TABLET ORAL DAILY
Qty: 8 | Refills: 3 | Status: DISCONTINUED | COMMUNITY
Start: 2022-10-17 | End: 2023-02-06

## 2023-02-06 RX ORDER — SPIRONOLACTONE 25 MG/1
25 TABLET ORAL
Qty: 90 | Refills: 1 | Status: DISCONTINUED | COMMUNITY
Start: 2022-10-10 | End: 2023-02-06

## 2023-02-06 RX ORDER — ALLOPURINOL 100 MG/1
100 TABLET ORAL
Qty: 30 | Refills: 0 | Status: DISCONTINUED | COMMUNITY
Start: 2022-10-10 | End: 2023-02-06

## 2023-02-06 RX ORDER — ACYCLOVIR 400 MG/1
400 TABLET ORAL TWICE DAILY
Refills: 0 | Status: DISCONTINUED | COMMUNITY
Start: 2022-10-10 | End: 2023-02-06

## 2023-02-06 RX ORDER — ATORVASTATIN CALCIUM 40 MG/1
40 TABLET, FILM COATED ORAL
Qty: 90 | Refills: 3 | Status: DISCONTINUED | COMMUNITY
Start: 2019-10-02 | End: 2023-02-06

## 2023-02-06 NOTE — PHYSICAL EXAM
[Ambulatory and capable of all self care but unable to carry out any work activities] : Status 2- Ambulatory and capable of all self care but unable to carry out any work activities. Up and about more than 50% of waking hours [Normal] : affect appropriate [Thin] : thin [de-identified] : Ambulating today [de-identified] : +PPM [de-identified] : + right abdominal subcutaneous nodules 3 clearly palpable, feel afixed to overlying skin with an associated rash (max size ~1.5 cm); No palpable cervical adenopathy appreciated

## 2023-02-06 NOTE — ASSESSMENT
[FreeTextEntry1] : 73 yo male with PMHx significant for follicular lymphoma with DLBCL (tx with R-CHOP in 2003, with relapse in 2009, treated with BR) with his initial presentation at [facility] with multiple areas of palpable lymphadenopathy with follicular lymphoma grade IIIA (IPI score 3) in the setting of a steady decline in weight > 20 lbs in the last 6 months without other constitutional complaints. 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 \par \par He underwent PET-CT in Aug 2022 which showed FDG avid lymph nodes in the left cervical, bilateral supraclavicular regions, and chest, and a few FDG avid abdominal lymph nodes, intramuscular masses in the left posterior chest and left upper thigh, scattered FDG avid subcutaneous soft tissue/nodules with trace right pleural effusion, moderate left pleural effusion, loculated fluid in the right middle lobe. Moderate abdominal and pelvic ascites. Subcutaneous edema in the lower abdominal wall extending into the imaged bilateral thighs. Splenomegaly with mild FDG avidity, suspicious for lymphomatous involvement.\par \par Prior to the PET-CT, he underwent biopsy of both a cervical lymph node and a left axilla lymph node. 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 There were areas of > 20 SUV on the PET-CT, repeat whole lymph node biopsy was requested to confirm suspected diagnosis of Grade 3B FL or transformed large cell lymphoma. S/p excisional biopsy of back lesion on 9/7/22 which showed recurrent DLBCL.\par \par LP 9/26/22: protein elevated at 61, LDH 27, neutrophils 0, lymphocytes 33, monocytes 67, RBC 5. Oligoclonal bands negative. The flow cytometry failed due to insufficient cells. Would consider positive and recommend continued IT MTX therapy going forward x 4 total cycles. He had an interim PET-CT performed on 2/1/23 (3 months after discontinuation of O-miniCHOP in the middle of his therapy - received 3/6 cycles) which showed possible persistent disease including a small, residual focus of increased FDG activity in the left level II region, likely corresponding to a difficult to delineate lymph node, is decreased in size and metabolism (SUV 3.5; image 33; previous SUV 16.1 and skin thickening and subcutaneous nodules, right lower anterior and lateral abdominal wall, slightly more extensive and similar in metabolism (SUV 7.1; image 170; previous SUV 5.5). \par \par We discussed the relative response to treatment at this point and the need for additional therapy. We re-emphasized the plan to start Obinutuzumab + lenalidomide within the next 1-2 weeks.\par \par Plan:\par - Repeat Lymphoma labs today, PT/PTT\par - Discontinued chemoimmunotherapy with Obi-miniCHOP in Nov 2022.\par - Plan to switch to Obinutuzumab 1000 mg every 28 days + Revlimid 20 mg daily 21/28 days for completion of total 3/6 cycles and possible maintenance due to recurrent infections and complications.\par - COVID vaccine (Pfizer: 3/2/21 & 3/23/21) and booster 9/21/21 and Oct 2022.\par - Follow up  in 2 weeks\par \par ___\par I personally have spent a total of 40 minutes of time on the date of this encounter reviewing test results, documenting findings, providing education, coordinating care and directly consulting with the patient and/or designated family member. \par

## 2023-02-06 NOTE — HISTORY OF PRESENT ILLNESS
[Disease:__________________________] : Disease: [unfilled] [Treatment Protocol] : Treatment Protocol [de-identified] : 71 yo with MHx significant for Atrial flutter (on Apixaban), HTN, here for further evaluation of low-level neutrophilia (since 1/2022) and lymphadenopathy. Previously NHL (around 8163-9186?). He received chemotherapy in 2004. 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 In 2010 he went to [place] and was treated for reoccurrence and was treated with bendamustine. He reports that he has had areas of swelling in his neck on and off for about 2 years which was mostly undergoing workup with his endocrinologist. In the last few months he has noticed increasing size of his left cervical LNs and a right nodule that caused swelling of his face, which has now spontaneously decreased in size significantly.\par \par Today he reports he feels well outside of weight loss. He denies any other constitutional complaints. He weighed 192 lbs in 10/2021 which was down to 183 lbs in December 12/2021(2 months later) which has further decreased down to 179 lbs today. He has not felt himself masses outside of his neck region. On 5/14/22, he went for US duplex of his left lower extremity due to a "tangerine size lump" on the back of his left thigh, which noted a 4.4 cm hypoechoic mass in his left inguinal region without commenting on his perceived posterior thigh mass. Also, on 5/2/22 he underwent US of his thyroid and parathyroid glands where they noticed bilateral cervical lymph nodes with the largest on the left side measuring 2.1 x 1.6 x 1.9 cm and a right level 1 LN measuring 2.2 x 1.2 x 2.3 cm. They saw 3 lymph nodes on the left side and one discrete LN on the right.\par \par  He also recently just finished a 14 day course of Levofloxacin for an uncomplicated phenomena. He was having a dry cough and underwent imaging as an outpatient which found mild left and right pleural effusions, areas of consolidation on the right and retrocardiac airspace involvement (performed 5/2/22). He now feels better without infectious complaints.\par \par In addition, he is currently anemic. He last had a colonoscopy in his recent memory. He had bleeding hemorrhoids last year treated with OTC medications. He denies any tick bites recently. \par \par He also has MGUS with 3 separate monoclonal gammopathies I suspect is related to his NHL. He has M Judd 1 showing IgG Lambda at 0.3 g/dl, M Judd 2 showing IgG Kappa at 0.2 g/dl, M Judd 3 showing IgM Lambda (unable to quantitate). There is no suspicion for myeloma or waldenstroms at this time and his M-spikes will be monitored. He has no elevation in kappa/ lambda FLC ration, but individual light chains are both elevated, kappa at 10.77 mg/dL and lambda at 6.58 mg/dL.\par \par Social Hx\par - He is currently retired. He used to be an .\par - No significant environmental exposure to chemicals\par - Lives by himself and his wife lives in [place].\par - Former smoker. He smoked for 10 years less than 1 pack a day. He quit 20 years ago\par \par Family hx\par - Two brothers non Hodgkins lymphoma. At least one required chemotherapy. sister had cervical cancer first diagnosed 2007 and then 3 years later with more cancer discovered\par - Mother and father passed away from heart disease and diabetes.\par \par =======================================================\par Care Providers:\[facility] \par PMD: Dr Amari Hickman\[facility] Endo: Dr Carter Vigil\[facility] Cardiologist: Dr. Don [phone] fax [phone]\par \par =======================================================\par \par Vonnie (daughter): [phone] - takes all healthcare related calls [de-identified] : PENDING [de-identified] : Fine Needle Aspiration Report - Auth (Verified)\par Specimen(s) Submitted\par 1. AXILLA, LEFT, US GUIDED CORE BIOPSY AND FNA\par 2. LYMPH NODE, CERVICAL, LEFT POSTERIOR, US GUIDED CORE BIOPSY AND FNA\par \par \par Final Diagnosis\par 1. AXILLA, LEFT, US GUIDED CORE BIOPSY AND FNA\par POSITIVE FOR MALIGNANT CELLS.\par B-cell lymphoma of follicular center origin, most consistent with\par follicular lymphoma, grade 3A.  See note.\par \par Fine Needle Aspiration Addendum Report - Auth (Verified)\par \par Addendum\par 2. LYMPH NODE, CERVICAL, LEFT POSTERIOR, US GUIDED CORE BIOPSY AND FNA\par POSITIVE FOR MALIGNANT CELLS.\par B-cell lymphoma of follicular center origin with high proliferation index.\par See note.\par \par Note:\par The neoplastic B cells demonstrate a follicular center B-cell phenotype with MUM-1 co-expression and a high proliferation index.  Follicular dendritic meshwork is present in most areas of the biopsy, consistent\par with follicular lymphoma.  However, there is one focal area with increased larger cells and lack of follicular dendritic meshwork. Therefore, a high grade component can not be excluded.  If clinically indicated, suggest excisional biopsy for definitive classification.\par \par Immunohistochemical stains   (CD3, CD5, CD10, CD20, CD21, CD23, CD30, BCL-6, BCL-2, cyclinD1, ki-67, c-MYC, PAX-5, MUM-1, p53, ISAURO) were performed on block 2C.  The neoplastic cells are positive for CD20, PAX-5, BCL-6, BCL-2, MUM-1 (partial), dim p53; negative CD5, CD10, CD30, cyclinD1, ISAURO.  CD21 and CD23 highlight follicular dendritic meshwork in most areas with focal absence of follicular dendritic meshwork. \par Ki-67 proliferation index is approximately 60 to 70%.  C-MYC stains approximately 20 to 30% of cells.  CD3 and CD5 highlight some T-cells in the background. [de-identified] : 6/14/22 FNA of Left axilla LN: FLUORESCENCE IN-SITU HYBRIDIZATION (FISH)\par 1. No evidence of MYC Rearrangement\par 2. No evidence of BCL2-IGH [translocation t(14;18)] gene rearrangement\par 3. 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 [de-identified] : 5/18/22: Initial Visit.\par \par 6/20/22: Follow-up. Patient feels well overall. Lymphoma labs and left axilla LN biopsy revealed grade 3A follicular lymphoma, IgG Lambda and Kappa MGUS. He reports lymph nodes have been stable. Heart function is stable. He denies having any recent fevers, chills, nausea. No weight changes.\par \par 8/22/22: Follow-up. Patient feels well overall. Awaiting for biopsy results of lymph nodes in his back. He does not have pain in the left back. The left side of his neck gives him discomfort. He has never received chemotherapy nor antibody treatment in the past. No fevers, chills, night sweats. No new noticeable masses  Good appetite, lost 7 lbs (lost a lot of fluid weight due to diuretics). \par \par 10/17/22: Follow up. In the interim, he was hospitalized at [facility] twice (8/27-9/12 & 9/16-10/3). 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 Patient was treated with rasburicase, but developed rasburicase-triggered G6PD hemolysis. Also found to be in acute exacerbation of HFrEF and have a prolonged QTc (likely medication-induced). During hospital stay was found to have altered mental status. 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 In mid-September patient was hospitalized for Encephalopathy (+Rhinovirus/enterovirus) unrelated to the Lymphoma. Today, he feels at baseline. Notes resolution of cervical LNs, and back lesions since starting treatment. Patient denies fever, chills, night sweats, back pain, abdominal pain, chest pain, or shortness of breath. Fair appetite, weight loss due to prolonged hospitalizations.\par \par 11/17/22: Follow-up. On 10/28/22 at home was found down by his daughter found to have fever, STEPHEN and AMS and was admitted for acute metabolic encephalopathy with sepsis 2/2 pneumonia s/p 7 days zosyn with improvement. Today he presents from rehab with his daughter. He is not feeling well. He feels that he is weak and fatigued. He has been doing PT / walking around the facility. He reads when awake. He has a poor appetite, but his family is bringing in food that he likes. No fevers, chills, night sweats. No new lumps or masses.\par \par 2/6/23: Follow-up. He was readmitted [facility] 1/9/23 to 1/20/23 for malaise and cytopenias. In December 2022 he had pneumonia and COVID19 for which there has been a long recovery. Due to these complications, his treatment with chemoimmunotherapy (O-miniCHOP) was discontinued. He is currently staying in a rehab to improve his performance status. He overall feels well today and reports feeling much improved relative to Dec 2022. He eats 3 meals a day, but prefers to eat late at night. His appetite is good and his daughter brings a lot of food supplementation for him. He continues to lose weight however. No other recent illnesses. He had a PET-CT last week.\par \par \par ___\par A comprehensive review of systems was performed including constitutional, eyes, ENT, cardiovascular, respiratory, gastrointestinal, genitourinary, musculoskeletal, integumentary, neurological, psychiatric and hematologic / lymphatic. All pertinent positives are included in the H&P under interval history above and the remaining review of systems listed are negative. \par \par ====================================================\par Treatment Hx:\par \par Obinutuzumab-mini-COEP q21 days x 3 cycles (incomplete due to toxicities and patient preference to switch to more tolerable regimen)\par (Obinutuzumab modified mini-COEP: 400 mg/m² cyclophosphamide, Etoposide (40% dose reduced to 30 mg/m2 IV on day 1 and 60 mg/m2 PO on days 2 and 3 of each cycle), and 2 mg vincristine (flat dose) on day 1 of each cycle, and 100 mg prednisone on days 1–5. Modified from Man et al 2009 Blood "R-CHOP with Etoposide Substituted for Doxorubicin (R-CEOP): Excellent Outcome in Diffuse Large B Cell Lymphoma for Patients with a Contraindication to Anthracyclines." with mini- dosing for elderly patients)\par - Cycle 1 Day 1 given 9/2/22 - third dose of Obinutuzumab held due to AMS, requiring admission to [facility] found to have enterovirus infection\par - Cycle 2 Day 1 given 9/30/22 - Given as an inpatient at [facility]\par - Cycle 3 Day 1 given 10/21/22 - Complicated by pneumonia, requiring admission and rehab placement\par \par Obintuzumab plus Revlimid (changed therapy due to patient and family's wishes due to intolerable toxicities)\par - Cycle 1 Day 1 Pending d/c from rehab and receipt of Revlimid then will schedule\par \par \par ==================================================== [FreeTextEntry1] : Obinutuzumab plus Revlimid 20 mg daily 21/28 days (PENDING start date due to recent hospitalization and insurance barriers)

## 2023-02-06 NOTE — CONSULT LETTER
[Dear  ___] : Dear ~ANTONIETTA, [Courtesy Letter:] : I had the pleasure of seeing your patient, [unfilled], in my office today. [Please see my note below.] : Please see my note below. [Consult Closing:] : Thank you very much for allowing me to participate in the care of this patient.  If you have any questions, please do not hesitate to contact me. [Sincerely,] : Sincerely, [FreeTextEntry3] : Jason Cordova DO, MSc\par , [facility]\par [facility] [place]\par [facility]\par [address] [address]\par Tel: [phone]\par Fax: [phone]

## 2023-02-07 NOTE — PROGRESS NOTE ADULT - PROBLEM SELECTOR PLAN 12
Chart review completed. Patient remains IP @ [facility]. Per recent progress note: \"On 1/30/23, Brigida's EVD was pulled back 1 cm due to catheter being against choroid plexus, which may have been causing her high csf RBCs. She then began intermittently leaking despite repeat sutures and lowering drain. She is now s/p right sided EVD exchange on 2/1/23 with Dr. Cookie Whaley. She remains admitted to [facility]\". ACM will continue to follow and outreach parent(s) upon discharge. Jeannine Braswell, MSN RN  Associate Care Manager  Phone: [phone]  Email: [email or web address] - cw statin (does not interact with g6pd) - currently on Hydralazine as above  - introducing GDMT as tolerated as above

## 2023-02-12 LAB
ALBUMIN SERPL ELPH-MCNC: 4 G/DL
ALP BLD-CCNC: 126 U/L
ALT SERPL-CCNC: 16 U/L
ANION GAP SERPL CALC-SCNC: 11 MMOL/L
AST SERPL-CCNC: 20 U/L
BILIRUB SERPL-MCNC: 0.3 MG/DL
BUN SERPL-MCNC: 29 MG/DL
CALCIUM SERPL-MCNC: 9.2 MG/DL
CHLORIDE SERPL-SCNC: 103 MMOL/L
CO2 SERPL-SCNC: 30 MMOL/L
CREAT SERPL-MCNC: 0.95 MG/DL
EGFR: 86 ML/MIN/1.73M2
GLUCOSE SERPL-MCNC: 113 MG/DL
LDH SERPL-CCNC: 231 U/L
POTASSIUM SERPL-SCNC: 4.4 MMOL/L
PROT SERPL-MCNC: 6.8 G/DL
SODIUM SERPL-SCNC: 144 MMOL/L
URATE SERPL-MCNC: 6.1 MG/DL

## 2023-02-14 ENCOUNTER — APPOINTMENT (OUTPATIENT)
Dept: CARDIOLOGY | Facility: CLINIC | Age: 73
End: 2023-02-14
Payer: MEDICARE

## 2023-02-14 ENCOUNTER — NON-APPOINTMENT (OUTPATIENT)
Age: 73
End: 2023-02-14

## 2023-02-14 ENCOUNTER — APPOINTMENT (OUTPATIENT)
Dept: NEUROLOGY | Facility: CLINIC | Age: 73
End: 2023-02-14
Payer: MEDICARE

## 2023-02-14 VITALS
WEIGHT: 135 LBS | HEIGHT: 62 IN | BODY MASS INDEX: 24.84 KG/M2 | DIASTOLIC BLOOD PRESSURE: 74 MMHG | HEART RATE: 57 BPM | SYSTOLIC BLOOD PRESSURE: 177 MMHG | OXYGEN SATURATION: 100 %

## 2023-02-14 VITALS
WEIGHT: 140 LBS | HEART RATE: 62 BPM | DIASTOLIC BLOOD PRESSURE: 71 MMHG | HEIGHT: 62 IN | BODY MASS INDEX: 25.76 KG/M2 | SYSTOLIC BLOOD PRESSURE: 149 MMHG

## 2023-02-14 VITALS — SYSTOLIC BLOOD PRESSURE: 160 MMHG | HEART RATE: 60 BPM | DIASTOLIC BLOOD PRESSURE: 75 MMHG

## 2023-02-14 LAB
ALBUMIN MFR SERPL ELPH: 56.4 %
ALBUMIN SERPL-MCNC: 3.8 G/DL
ALBUMIN/GLOB SERPL: 1.3 RATIO
ALPHA1 GLOB MFR SERPL ELPH: 5.1 %
ALPHA1 GLOB SERPL ELPH-MCNC: 0.3 G/DL
ALPHA2 GLOB MFR SERPL ELPH: 12.2 %
ALPHA2 GLOB SERPL ELPH-MCNC: 0.8 G/DL
B-GLOBULIN MFR SERPL ELPH: 9.2 %
B-GLOBULIN SERPL ELPH-MCNC: 0.6 G/DL
DEPRECATED KAPPA LC FREE/LAMBDA SER: 1.63 RATIO
GAMMA GLOB FLD ELPH-MCNC: 1.2 G/DL
GAMMA GLOB MFR SERPL ELPH: 17.1 %
IGA SER QL IEP: 113 MG/DL
IGG SER QL IEP: 1172 MG/DL
IGM SER QL IEP: 182 MG/DL
INTERPRETATION SERPL IEP-IMP: NORMAL
KAPPA LC CSF-MCNC: 3.01 MG/DL
KAPPA LC SERPL-MCNC: 4.91 MG/DL
M PROTEIN MFR SERPL ELPH: NORMAL
M PROTEIN SPEC IFE-MCNC: NORMAL
MONOCLON BAND OBS SERPL: NORMAL
PROT SERPL-MCNC: 6.8 G/DL
PROT SERPL-MCNC: 6.8 G/DL

## 2023-02-14 PROCEDURE — 99215 OFFICE O/P EST HI 40 MIN: CPT

## 2023-02-14 PROCEDURE — 93000 ELECTROCARDIOGRAM COMPLETE: CPT

## 2023-02-14 PROCEDURE — 93010 ELECTROCARDIOGRAM REPORT: CPT

## 2023-02-14 RX ORDER — SIMVASTATIN 40 MG/1
40 TABLET, FILM COATED ORAL
Refills: 0 | Status: DISCONTINUED | COMMUNITY
End: 2023-02-14

## 2023-02-14 NOTE — DISCUSSION/SUMMARY
[Patient] : the patient [EKG obtained to assist in diagnosis and management of assessed problem(s)] : EKG obtained to assist in diagnosis and management of assessed problem(s) 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 [Cardiomyopathy] : cardiomyopathy [NYHA Class II] : NYHA Class II [Hypertensive Cardiomyopathy] : hypertensive cardiomyopathy [Echocardiogram] : echocardiogram [None] : There are no changes in medication management [Resynchronization Therapy] : resynchronization therapy [Coronary Artery Disease] : coronary artery disease [Systolic Heart Failure] : systolic heart failure [Compensated] : compensated [Essential Hypertension] : essential hypertension [Responding to Treatment] : responding to treatment [Medication Changes Per Orders] : Medication changes are as documented in orders [de-identified] : 100% bi-ventricular paced [FreeTextEntry1] : discussed need to follow up with pacemaker monitoring. He has disconnected monitoring device and missed recent EP appointment. He and daughter will call to reschedule [de-identified] : Rehab facility has him on simvastatin, correct back to atorvastatin [de-identified] : sharifa [de-identified] : replaced furosemide with torsemide, but with multiple hospital stays now off all diuretic and has no edema  [de-identified] : in association with multiple severe illnesses including follicular lymphoma undergoing chemotherapy blood pressure currently not elevated [de-identified] : off diuretics, back on ACE-i, blood pressure mildly elevated, observe for now [de-identified] : Too many instances of failing to take medications, today he says it is because going to internist and instructed to not eat for blood tests

## 2023-02-14 NOTE — CARDIOLOGY SUMMARY
[de-identified] : 6/24/2021 sinus rhythm with bi-ventricular paced rhythm\par 12/23/2021 mild sinus tachycardia, P-sense, biventricular paced.\par 5/24/2022 sinus rhythm 64,  P-sense and biventricular paced.\par 6/13/2022 sinus tachycardia 108, P-sense and biventricular pace.\par 7/6/2022  probable atrial tachycardia with biventricular pacing 2:1, heart rate 108.\par 8/2/2022 sinus tachycardia and biventricular paced at 102.\par 10/10/2022 \par 2/14/2023 atrial and biventricular paced. [de-identified] : 6/24/2021 mild to moderate global LV dysfunction with dilated and hypertrophied LV, left atrial enlargement, normal right heart with device wire, mild to moderate MR. Compared to 11/7/2019 hospital study pulmonary hypertension improved and all other findings unchanged.

## 2023-02-14 NOTE — REVIEW OF SYSTEMS
[Weight Loss (___ Lbs)] : [unfilled] ~Ulb weight loss [Lower Ext Edema] : lower extremity edema [Negative] : Heme/Lymph [SOB] : no shortness of breath [Dyspnea on exertion] : not dyspnea during exertion [Leg Claudication] : no intermittent leg claudication [Palpitations] : no palpitations [Orthopnea] : no orthopnea [PND] : no PND

## 2023-02-14 NOTE — HISTORY OF PRESENT ILLNESS
[FreeTextEntry1] : Mr. 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 He was roughly 30 lbs above his last discharge weight and the trigger is not taking his medications for 3 months because he felt well and did not refill prescriptions. He was also found to have atrial flutter which was a new diagnosis. Successfully diuresed 40 lbs in hospital and underwent CRT-P upgrade from Micra due to high pacing burden. Had TE guided cardioversion and remained in sinus rhythm. Continues to feel well, now fully active, able to walk quickly up flights of stairs, walk good distances all without dyspnea and denies orthopnea. \par \par At a prior visit found serum potassium elevated and endocrine had instructed to increase spironolactone, but contacted him to remain unchanged and avoid high potassium sources in diet. Since then doing very well, active, no dyspnea, there is no orthopnea or paroxysmal nocturnal dyspnea. Walks regularly, maybe half mile to and from stores.\par \par Since last seen doing extremely well, no symptoms, walking outdoors for exercise. However, apixaban became too expensive (Medicare D "donut hole") and has not been taking.\par \par Recently weight up, not feeling well, no fever, saw internist, treated with antibiotic for pneumonia and feeling better. However at pacemaker interrogation Optivol indicated volume excess. There is no orthopnea or paroxysmal nocturnal dyspnea At last visit observed good response to diuretic, making large amount of urine and feeling better, however Optivol still elevated and had runs of NSVT. Developed several prominent lymph nodes and biopsy  planned, off apixaban. Biopsy accomplished, diagnosis seems to be follicular lymphoma. Resumed apixaban.\par \par Referred to EP to consider possible atrial tachycardia, flutter, but concluded was sinus tachycardia. He is short of breath on minimal exertion, but denies orthopnea or paroxysmal nocturnal dyspnea. One week ago switched diuretic to torsemide and dramatic response.\par \par Admitted to hospital multiple times since last visit. Had excision of malignant mass on back, diagnosis of follicular lymphoma, started chemotherapy then admitted mid September and remained until October 3 for altered mental status. \par \par Again multiple hospital admissions since last visit. Had COVID pneumonia, anemia, lymphoma. Comes to visit today from Rehab facility.

## 2023-02-14 NOTE — PHYSICAL EXAM
[Elevated JVD ____cm] : elevated JVD ~Vcm [Not Palpable] : not palpable [Normal Rate] : normal [Rhythm Regular] : regular [Normal S1] : normal S1 [Normal S2] : normal S2 [II] : a grade 2 [Holosystolic] : holosystolic [[place]] : the murmur was transmitted to the apex [No Pitting Edema] : no pitting edema present [2+] : left 2+ [1+] : left 1+ [0] : left 0 [Dullness ____] : dullness [unfilled] [Normal] : alert and oriented, normal memory [Right Carotid Bruit] : no bruit heard over the right carotid [Left Carotid Bruit] : no bruit heard over the left carotid [de-identified] : left infraclavicular pacemaker pocket

## 2023-02-15 NOTE — PHYSICAL EXAM
[FreeTextEntry1] : He is alert, friendly and cooperative.  Names objects and follows commands.  He is disoriented to month and year.  Short-term recall is markedly impaired.  He is able to spell but not reverse spelling of a 5-letter word.  He is unable to calculate the number of nickels that equal $2.  No anosmia.  Visual fields are full to confrontation.  Pupils equal and constrict to light.  Extraocular movements intact.  There is no focal weakness.  He has impaired graphesthesia in both hands.  Gait and coordination intact.  No pathologic reflexes.

## 2023-02-15 NOTE — HISTORY OF PRESENT ILLNESS
[FreeTextEntry1] : Patient accompanied by daughter who "Googled" my name and wanted additional neurologic opinion regarding her father's cognitive decline. He has a long and complex history that is detailed by his hematologist, Dr. Cordova, who saw Mr. Skaggs last week:\par \par "73 yo with MHx significant for Atrial flutter (on Apixaban), HTN, here for further evaluation of low-level neutrophilia (since 1/2022) and lymphadenopathy. Previously NHL (around 3637-2977?). He received chemotherapy in 2004. 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 In 2010 he went to [place] and was treated for reoccurrence and was treated with bendamustine. He reports that he has had areas of swelling in his neck on and off for about 2 years which was mostly undergoing workup with his endocrinologist. In the last few months he has noticed increasing size of his left cervical LNs and a right nodule that caused swelling of his face, which has now spontaneously decreased in size significantly.\par \par Today he reports he feels well outside of weight loss. He denies any other constitutional complaints. He weighed 192 lbs in 10/2021 which was down to 183 lbs in December 12/2021(2 months later) which has further decreased down to 179 lbs today. He has not felt himself masses outside of his neck region. On 5/14/22, he went for US duplex of his left lower extremity due to a "tangerine size lump" on the back of his left thigh, which noted a 4.4 cm hypoechoic mass in his left inguinal region without commenting on his perceived posterior thigh mass. Also, on 5/2/22 he underwent US of his thyroid and parathyroid glands where they noticed bilateral cervical lymph nodes with the largest on the left side measuring 2.1 x 1.6 x 1.9 cm and a right level 1 LN measuring 2.2 x 1.2 x 2.3 cm. They saw 3 lymph nodes on the left side and one discrete LN on the right.\par \par  He also recently just finished a 14 day course of Levofloxacin for an uncomplicated phenomena. He was having a dry cough and underwent imaging as an outpatient which found mild left and right pleural effusions, areas of consolidation on the right and retrocardiac airspace involvement (performed 5/2/22). He now feels better without infectious complaints.\par \par In addition, he is currently anemic. He last had a colonoscopy in his recent memory. He had bleeding hemorrhoids last year treated with OTC medications. He denies any tick bites recently. \par \par He also has MGUS with 3 separate monoclonal gammopathies I suspect is related to his NHL. He has M Judd 1 showing IgG Lambda at 0.3 g/dl, M Judd 2 showing IgG Kappa at 0.2 g/dl, M Judd 3 showing IgM Lambda (unable to quantitate). There is no suspicion for myeloma or waldenstroms at this time and his M-spikes will be monitored. He has no elevation in kappa/ lambda FLC ration, but individual light chains are both elevated, kappa at 10.77 mg/dL and lambda at 6.58 mg/dL.\par \par Social Hx\par - He is currently retired. He used to be an .\par - No significant environmental exposure to chemicals\par - Lives by himself and his wife lives in [place].\par - Former smoker. He smoked for 10 years less than 1 pack a day. He quit 20 years ago\par \par Family hx\par - Two brothers non Hodgkins lymphoma. At least one required chemotherapy. sister had cervical cancer first diagnosed 2007 and then 3 years later with more cancer discovered\par - Mother and father passed away from heart disease and diabetes.\par \par =======================================================\par Care Providers:\par \par PMD: Dr Amari Hickman\par Endo: Dr Carter Vigil\par Cardiologist: Dr. Don [phone] fax [phone]\par \par =======================================================\par \par Vonnie (daughter): [phone] - takes all healthcare related calls. \par \par Disease: Unknown, working up \par Stage:. PENDING. \par Pathology: Fine Needle Aspiration Report - Auth (Verified)\par Specimen(s) Submitted\par 1. AXILLA, LEFT, US GUIDED CORE BIOPSY AND FNA\par 2. LYMPH NODE, CERVICAL, LEFT POSTERIOR, US GUIDED CORE BIOPSY AND FNA\par \par \par Final Diagnosis\par 1. AXILLA, LEFT, US GUIDED CORE BIOPSY AND FNA\par POSITIVE FOR MALIGNANT CELLS.\par B-cell lymphoma of follicular center origin, most consistent with\par follicular lymphoma, grade 3A. See note.\par \par Fine Needle Aspiration Addendum Report - Auth (Verified)\par \par Addendum\par 2. LYMPH NODE, CERVICAL, LEFT POSTERIOR, US GUIDED CORE BIOPSY AND FNA\par POSITIVE FOR MALIGNANT CELLS.\par B-cell lymphoma of follicular center origin with high proliferation index.\par See note.\par \par Note:\par The neoplastic B cells demonstrate a follicular center B-cell phenotype with MUM-1 co-expression and a high proliferation index. Follicular dendritic meshwork is present in most areas of the biopsy, consistent\par with follicular lymphoma. However, there is one focal area with increased larger cells and lack of follicular dendritic meshwork. Therefore, a high grade component can not be excluded. If clinically indicated, suggest excisional biopsy for definitive classification.\par \par Immunohistochemical stains (CD3, CD5, CD10, CD20, CD21, CD23, CD30, BCL-6, BCL-2, cyclinD1, ki-67, c-MYC, PAX-5, MUM-1, p53, ISAURO) were performed on block 2C. The neoplastic cells are positive for CD20, PAX-5, BCL-6, BCL-2, MUM-1 (partial), dim p53; negative CD5, CD10, CD30, cyclinD1, ISAURO. CD21 and CD23 highlight follicular dendritic meshwork in most areas with focal absence of follicular dendritic meshwork. \par Ki-67 proliferation index is approximately 60 to 70%. C-MYC stains approximately 20 to 30% of cells. CD3 and CD5 highlight some T-cells in the background. \par Tumor/ Prognostic Markers: 6/14/22 FNA of Left axilla LN: FLUORESCENCE IN-SITU HYBRIDIZATION (FISH)\par 1. No evidence of MYC Rearrangement\par 2. No evidence of BCL2-IGH [translocation t(14;18)] gene rearrangement\par 3. 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 \par \par \par \par Interval History: 5/18/22: Initial Visit.\par \par 6/20/22: Follow-up. Patient feels well overall. Lymphoma labs and left axilla LN biopsy revealed grade 3A follicular lymphoma, IgG Lambda and Kappa MGUS. He reports lymph nodes have been stable. Heart function is stable. He denies having any recent fevers, chills, nausea. No weight changes.\par \par 8/22/22: Follow-up. Patient feels well overall. Awaiting for biopsy results of lymph nodes in his back. He does not have pain in the left back. The left side of his neck gives him discomfort. He has never received chemotherapy nor antibody treatment in the past. No fevers, chills, night sweats. No new noticeable masses Good appetite, lost 7 lbs (lost a lot of fluid weight due to diuretics). \par \par 10/17/22: Follow up. In the interim, he was hospitalized at [facility] twice (8/27-9/12 & 9/16-10/3). 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 Patient was treated with rasburicase, but developed rasburicase-triggered G6PD hemolysis. Also found to be in acute exacerbation of HFrEF and have a prolonged QTc (likely medication-induced). During hospital stay was found to have altered mental status. 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 In mid-September patient was hospitalized for Encephalopathy (+Rhinovirus/enterovirus) unrelated to the Lymphoma. Today, he feels at baseline. Notes resolution of cervical LNs, and back lesions since starting treatment. Patient denies fever, chills, night sweats, back pain, abdominal pain, chest pain, or shortness of breath. Fair appetite, weight loss due to prolonged hospitalizations.\par \par 11/17/22: Follow-up. On 10/28/22 at home was found down by his daughter found to have fever, STEPHEN and AMS and was admitted for acute metabolic encephalopathy with sepsis 2/2 pneumonia s/p 7 days zosyn with improvement. Today he presents from rehab with his daughter. He is not feeling well. He feels that he is weak and fatigued. He has been doing PT / walking around the facility. He reads when awake. He has a poor appetite, but his family is bringing in food that he likes. No fevers, chills, night sweats. No new lumps or masses.\par \par 2/6/23: Follow-up. He was readmitted [facility] 1/9/23 to 1/20/23 for malaise and cytopenias. In December 2022 he had pneumonia and COVID19 for which there has been a long recovery. Due to these complications, his treatment with chemoimmunotherapy (O-miniCHOP) was discontinued. He is currently staying in a rehab to improve his performance status. He overall feels well today and reports feeling much improved relative to Dec 2022. He eats 3 meals a day, but prefers to eat late at night. His appetite is good and his daughter brings a lot of food supplementation for him. He continues to lose weight however. No other recent illnesses. He had a PET-CT last week.\par \par \par ___\par \par \par ====================================================\par Treatment Hx:\par \par Obinutuzumab-mini-COEP q21 days x 3 cycles (incomplete due to toxicities and patient preference to switch to more tolerable regimen)\par (Obinutuzumab modified mini-COEP: 400 mg/m² cyclophosphamide, Etoposide (40% dose reduced to 30 mg/m2 IV on day 1 and 60 mg/m2 PO on days 2 and 3 of each cycle), and 2 mg vincristine (flat dose) on day 1 of each cycle, and 100 mg prednisone on days 1–5. Modified from Man et al 2009 Blood "R-CHOP with Etoposide Substituted for Doxorubicin (R-CEOP): Excellent Outcome in Diffuse Large B Cell Lymphoma for Patients with a Contraindication to Anthracyclines." with mini- dosing for elderly patients)\par - Cycle 1 Day 1 given 9/2/22 - third dose of Obinutuzumab held due to AMS, requiring admission to [facility] found to have enterovirus infection\par - Cycle 2 Day 1 given 9/30/22 - Given as an inpatient at [facility]\par - Cycle 3 Day 1 given 10/21/22 - Complicated by pneumonia, requiring admission and rehab placement\par \par Obintuzumab plus Revlimid (changed therapy due to patient and family's wishes due to intolerable toxicities)\par - Cycle 1 Day 1 Pending d/c from rehab and receipt of Revlimid then will schedule\par \par Current Treatment Status: Treatment Protocol . Obinutuzumab plus Revlimid 20 mg daily 21/28 days (PENDING start date due to recent hospitalization and insurance barriers)."\par \par Review of the [facility] admission 5 months ago reveals extensive neurologic work-up.  He was seen by Dr. Michael Dobson of the neurology team.  CT scan and MRI of brain revealed an old right parietal infarction.  EEG revealed mild slowing and no epileptogenic activity.  A fluoroscopic guided spinal tap was unrevealing.  He was felt to have a toxic-metabolic encephalopathy at that time.  Patient was seen by psychiatry who felt patient had either delirium or dementia.\par \par His cognitive impairment however has persisted.  His daughter provides history that a maternal aunt has Alzheimer's disease.\par \par \par

## 2023-02-15 NOTE — ASSESSMENT
[FreeTextEntry1] : His persistence of the dementia following multiple hospital admissions including COVID infection over the last 6 months is concerning.  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  He may have a vascular dementia related to his arrhythmia.  He may have a neurodegenerative disorder.\par \par Patient and his daughter advised that I will be leaving [facility] at the end of next month and I referred Mr. Skaggs to follow-up with one of our behavioral neurologists.

## 2023-02-22 ENCOUNTER — TRANSCRIPTION ENCOUNTER (OUTPATIENT)
Age: 73
End: 2023-02-22

## 2023-02-23 ENCOUNTER — FORM ENCOUNTER (OUTPATIENT)
Age: 73
End: 2023-02-23

## 2023-02-23 ENCOUNTER — APPOINTMENT (OUTPATIENT)
Dept: ELECTROPHYSIOLOGY | Facility: CLINIC | Age: 73
End: 2023-02-23

## 2023-03-06 ENCOUNTER — NON-APPOINTMENT (OUTPATIENT)
Age: 73
End: 2023-03-06

## 2023-03-06 ENCOUNTER — APPOINTMENT (OUTPATIENT)
Dept: ELECTROPHYSIOLOGY | Facility: CLINIC | Age: 73
End: 2023-03-06
Payer: MEDICARE

## 2023-03-06 PROCEDURE — 93294 REM INTERROG EVL PM/LDLS PM: CPT

## 2023-03-06 PROCEDURE — 93296 REM INTERROG EVL PM/IDS: CPT

## 2023-03-07 ENCOUNTER — NON-APPOINTMENT (OUTPATIENT)
Age: 73
End: 2023-03-07

## 2023-03-13 ENCOUNTER — APPOINTMENT (OUTPATIENT)
Dept: HEMATOLOGY ONCOLOGY | Facility: CLINIC | Age: 73
End: 2023-03-13

## 2023-03-13 ENCOUNTER — OUTPATIENT (OUTPATIENT)
Dept: OUTPATIENT SERVICES | Facility: HOSPITAL | Age: 73
LOS: 1 days | Discharge: HOME | End: 2023-03-13

## 2023-03-13 ENCOUNTER — APPOINTMENT (OUTPATIENT)
Dept: HEMATOLOGY ONCOLOGY | Facility: CLINIC | Age: 73
End: 2023-03-13
Payer: MEDICARE

## 2023-03-13 ENCOUNTER — RESULT REVIEW (OUTPATIENT)
Age: 73
End: 2023-03-13

## 2023-03-13 VITALS
SYSTOLIC BLOOD PRESSURE: 168 MMHG | WEIGHT: 143.96 LBS | RESPIRATION RATE: 16 BRPM | HEIGHT: 61.97 IN | HEART RATE: 60 BPM | BODY MASS INDEX: 26.49 KG/M2 | OXYGEN SATURATION: 99 % | TEMPERATURE: 97.2 F | DIASTOLIC BLOOD PRESSURE: 78 MMHG

## 2023-03-13 DIAGNOSIS — Z95.0 PRESENCE OF CARDIAC PACEMAKER: Chronic | ICD-10-CM

## 2023-03-13 DIAGNOSIS — C85.90 NON-HODGKIN LYMPHOMA, UNSPECIFIED, UNSPECIFIED SITE: Chronic | ICD-10-CM

## 2023-03-13 DIAGNOSIS — C82.20 FOLLICULAR LYMPHOMA GRADE III, UNSPECIFIED, UNSPECIFIED SITE: ICD-10-CM

## 2023-03-13 LAB
BASOPHILS # BLD AUTO: 0.06 K/UL — SIGNIFICANT CHANGE UP (ref 0–0.2)
BASOPHILS NFR BLD AUTO: 1.2 % — SIGNIFICANT CHANGE UP (ref 0–2)
EOSINOPHIL # BLD AUTO: 0.57 K/UL — HIGH (ref 0–0.5)
EOSINOPHIL NFR BLD AUTO: 11 % — HIGH (ref 0–6)
HCT VFR BLD CALC: 38.1 % — LOW (ref 39–50)
HGB BLD-MCNC: 11.9 G/DL — LOW (ref 13–17)
IMM GRANULOCYTES NFR BLD AUTO: 1 % — HIGH (ref 0–0.9)
LYMPHOCYTES # BLD AUTO: 0.42 K/UL — LOW (ref 1–3.3)
LYMPHOCYTES # BLD AUTO: 8.1 % — LOW (ref 13–44)
MCHC RBC-ENTMCNC: 30.4 PG — SIGNIFICANT CHANGE UP (ref 27–34)
MCHC RBC-ENTMCNC: 31.2 G/DL — LOW (ref 32–36)
MCV RBC AUTO: 97.2 FL — SIGNIFICANT CHANGE UP (ref 80–100)
MONOCYTES # BLD AUTO: 0.59 K/UL — SIGNIFICANT CHANGE UP (ref 0–0.9)
MONOCYTES NFR BLD AUTO: 11.4 % — SIGNIFICANT CHANGE UP (ref 2–14)
NEUTROPHILS # BLD AUTO: 3.5 K/UL — SIGNIFICANT CHANGE UP (ref 1.8–7.4)
NEUTROPHILS NFR BLD AUTO: 67.3 % — SIGNIFICANT CHANGE UP (ref 43–77)
NRBC # BLD: 0 /100 WBCS — SIGNIFICANT CHANGE UP (ref 0–0)
PLATELET # BLD AUTO: 153 K/UL — SIGNIFICANT CHANGE UP (ref 150–400)
RBC # BLD: 3.92 M/UL — LOW (ref 4.2–5.8)
RBC # FLD: 15.5 % — HIGH (ref 10.3–14.5)
WBC # BLD: 5.19 K/UL — SIGNIFICANT CHANGE UP (ref 3.8–10.5)
WBC # FLD AUTO: 5.19 K/UL — SIGNIFICANT CHANGE UP (ref 3.8–10.5)

## 2023-03-13 PROCEDURE — 99214 OFFICE O/P EST MOD 30 MIN: CPT

## 2023-03-14 ENCOUNTER — APPOINTMENT (OUTPATIENT)
Dept: CARDIOLOGY | Facility: CLINIC | Age: 73
End: 2023-03-14
Payer: MEDICARE

## 2023-03-14 ENCOUNTER — NON-APPOINTMENT (OUTPATIENT)
Age: 73
End: 2023-03-14

## 2023-03-14 ENCOUNTER — APPOINTMENT (OUTPATIENT)
Dept: INTERNAL MEDICINE | Facility: CLINIC | Age: 73
End: 2023-03-14
Payer: MEDICARE

## 2023-03-14 VITALS
WEIGHT: 142 LBS | DIASTOLIC BLOOD PRESSURE: 70 MMHG | HEIGHT: 69 IN | SYSTOLIC BLOOD PRESSURE: 130 MMHG | BODY MASS INDEX: 21.03 KG/M2

## 2023-03-14 VITALS
HEART RATE: 60 BPM | DIASTOLIC BLOOD PRESSURE: 80 MMHG | BODY MASS INDEX: 20.97 KG/M2 | WEIGHT: 142 LBS | SYSTOLIC BLOOD PRESSURE: 142 MMHG | OXYGEN SATURATION: 100 %

## 2023-03-14 VITALS — DIASTOLIC BLOOD PRESSURE: 75 MMHG | SYSTOLIC BLOOD PRESSURE: 120 MMHG | HEART RATE: 60 BPM

## 2023-03-14 DIAGNOSIS — E79.0 HYPERURICEMIA W/OUT SIGNS OF INFLAMMATORY ARTHRITIS AND TOPHACEOUS DISEASE: ICD-10-CM

## 2023-03-14 DIAGNOSIS — R73.01 IMPAIRED FASTING GLUCOSE: ICD-10-CM

## 2023-03-14 LAB
ALBUMIN SERPL ELPH-MCNC: 4.4 G/DL
ALP BLD-CCNC: 144 U/L
ALT SERPL-CCNC: 23 U/L
ANION GAP SERPL CALC-SCNC: 13 MMOL/L
APTT BLD: 39.3 SEC
AST SERPL-CCNC: 28 U/L
BILIRUB SERPL-MCNC: 0.3 MG/DL
BUN SERPL-MCNC: 29 MG/DL
CALCIUM SERPL-MCNC: 9.7 MG/DL
CHLORIDE SERPL-SCNC: 106 MMOL/L
CO2 SERPL-SCNC: 27 MMOL/L
CREAT SERPL-MCNC: 0.97 MG/DL
EGFR: 83 ML/MIN/1.73M2
GLUCOSE SERPL-MCNC: 101 MG/DL
INR PPP: 1.48 RATIO
LDH SERPL-CCNC: 270 U/L
POTASSIUM SERPL-SCNC: 4.8 MMOL/L
PROT SERPL-MCNC: 7.1 G/DL
PT BLD: 17.2 SEC
SODIUM SERPL-SCNC: 147 MMOL/L
URATE SERPL-MCNC: 6.9 MG/DL

## 2023-03-14 PROCEDURE — 99214 OFFICE O/P EST MOD 30 MIN: CPT

## 2023-03-14 PROCEDURE — 93000 ELECTROCARDIOGRAM COMPLETE: CPT

## 2023-03-14 NOTE — HISTORY OF PRESENT ILLNESS
[FreeTextEntry1] : Mr. 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 He was roughly 30 lbs above his last discharge weight and the trigger is not taking his medications for 3 months because he felt well and did not refill prescriptions. He was also found to have atrial flutter which was a new diagnosis. Successfully diuresed 40 lbs in hospital and underwent CRT-P upgrade from Micra due to high pacing burden. Had TE guided cardioversion and remained in sinus rhythm. Continues to feel well, now fully active, able to walk quickly up flights of stairs, walk good distances all without dyspnea and denies orthopnea. \par \par At a prior visit found serum potassium elevated and endocrine had instructed to increase spironolactone, but contacted him to remain unchanged and avoid high potassium sources in diet. Since then doing very well, active, no dyspnea, there is no orthopnea or paroxysmal nocturnal dyspnea. Walks regularly, maybe half mile to and from stores.\par \par Since last seen doing extremely well, no symptoms, walking outdoors for exercise. However, apixaban became too expensive (Medicare D "donut hole") and has not been taking.\par \par Recently weight up, not feeling well, no fever, saw internist, treated with antibiotic for pneumonia and feeling better. However at pacemaker interrogation Optivol indicated volume excess. There is no orthopnea or paroxysmal nocturnal dyspnea At last visit observed good response to diuretic, making large amount of urine and feeling better, however Optivol still elevated and had runs of NSVT. Developed several prominent lymph nodes and biopsy  planned, off apixaban. Biopsy accomplished, diagnosis seems to be follicular lymphoma. Resumed apixaban.\par \par Referred to EP to consider possible atrial tachycardia, flutter, but concluded was sinus tachycardia. He is short of breath on minimal exertion, but denies orthopnea or paroxysmal nocturnal dyspnea. One week ago switched diuretic to torsemide and dramatic response.\par \par Admitted to hospital multiple times since last visit. Had excision of malignant mass on back, diagnosis of follicular lymphoma, started chemotherapy then admitted mid September and remained until October 3 for altered mental status. \par \par Again multiple hospital admissions since last visit. Had COVID pneumonia, anemia, lymphoma. Visit one month ago from Rehab facility. Now home with wife and daughter and visiting nurses.

## 2023-03-14 NOTE — ASSESSMENT
[FreeTextEntry1] : 73 yo male with PMHx significant for follicular lymphoma with DLBCL (tx with R-CHOP in 2003, with relapse in 2009, treated with BR) with his initial presentation at [facility] with multiple areas of palpable lymphadenopathy with follicular lymphoma grade IIIA (IPI score 3) in the setting of a steady decline in weight > 20 lbs in the last 6 months without other constitutional complaints. 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 \par \par He underwent PET-CT in Aug 2022 which showed FDG avid lymph nodes in the left cervical, bilateral supraclavicular regions, and chest, and a few FDG avid abdominal lymph nodes, intramuscular masses in the left posterior chest and left upper thigh, scattered FDG avid subcutaneous soft tissue/nodules with trace right pleural effusion, moderate left pleural effusion, loculated fluid in the right middle lobe. Moderate abdominal and pelvic ascites. Subcutaneous edema in the lower abdominal wall extending into the imaged bilateral thighs. Splenomegaly with mild FDG avidity, suspicious for lymphomatous involvement.\par \par Prior to the PET-CT, he underwent biopsy of both a cervical lymph node and a left axilla lymph node. 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 There were areas of > 20 SUV on the PET-CT, repeat whole lymph node biopsy was requested to confirm suspected diagnosis of Grade 3B FL or transformed large cell lymphoma. S/p excisional biopsy of back lesion on 9/7/22 which showed recurrent DLBCL.\par \par LP 9/26/22: protein elevated at 61, LDH 27, neutrophils 0, lymphocytes 33, monocytes 67, RBC 5. Oligoclonal bands negative. The flow cytometry failed due to insufficient cells. Would consider positive and recommend continued IT MTX therapy going forward x 4 total cycles. He had an interim PET-CT performed on 2/1/23 (3 months after discontinuation of O-miniCHOP in the middle of his therapy - received 3/6 cycles) which showed possible persistent disease including a small, residual focus of increased FDG activity in the left level II region, likely corresponding to a difficult to delineate lymph node, is decreased in size and metabolism (SUV 3.5; image 33; previous SUV 16.1 and skin thickening and subcutaneous nodules, right lower anterior and lateral abdominal wall, slightly more extensive and similar in metabolism (SUV 7.1; image 170; previous SUV 5.5). \par \par We discussed the relative response to treatment at this point and the need for additional therapy. We re-emphasized the plan to start Tafasitamab (anti-CD19; difficult for family to come in weekly) + lenalidomide or Obinutuzumab + lenalidomide within the next 1-2 weeks.\par \par Plan:\par - Repeat Lymphoma labs today, PT/PTT\par - Discontinued chemoimmunotherapy with Obi-miniCHOP in Nov 2022.\par - Received Revlimid approval for free drug through to Dec 2023\par - Plan to switch preferably to Tafasitamab (12 mg/kg weekly for cycle 1-3 then biweekly thereafter) + lenalidomide 20 mg daily 21/28 days in the next 1-2 weeks, however weekly infusions may be too difficult for the patient's family, who need to accompany him due to suspected dementia and behavioral issues \par - Alternative therapy would be Obinutuzumab 1000 mg every 28 days + lenalidomide 20 mg daily 21/28 days\par - Behavioral issues: Suspect dementia as he has had fairly consistent times of confusion and aggressive behavior over the past 7 months at least. Recommend psychiatric and neurology follow-up, however we explained that we are limited to treatment of his cancer and he will need to see other specialists to address behavior issues and the question as to possible worsening dementia.\par - COVID vaccine (Pfizer: 3/2/21 & 3/23/21) and booster 9/21/21 and Oct 2022.\par - Follow up  in 2 weeks\par \par ___\par I personally have spent a total of 30 minutes of time on the date of this encounter reviewing test results, documenting findings, providing education, coordinating care and directly consulting with the patient and/or designated family member. \par

## 2023-03-14 NOTE — HISTORY OF PRESENT ILLNESS
[Disease:__________________________] : Disease: [unfilled] [Treatment Protocol] : Treatment Protocol [de-identified] : Initial Presentation:\par \par 73 yo with MHx significant for Atrial flutter (on Apixaban), HTN, here for further evaluation of low-level neutrophilia (since 1/2022) and lymphadenopathy. Previously NHL (around 2150-1197?). He received chemotherapy in 2004. 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 In 2010 he went to [facility] and was treated for reoccurrence and was treated with bendamustine. He reports that he has had areas of swelling in his neck on and off for about 2 years which was mostly undergoing workup with his endocrinologist. In the last few months he has noticed increasing size of his left cervical LNs and a right nodule that caused swelling of his face, which has now spontaneously decreased in size significantly.\par \par Today he reports he feels well outside of weight loss. He denies any other constitutional complaints. He weighed 192 lbs in 10/2021 which was down to 183 lbs in December 12/2021(2 months later) which has further decreased down to 179 lbs today. He has not felt himself masses outside of his neck region. On 5/14/22, he went for US duplex of his left lower extremity due to a "tangerine size lump" on the back of his left thigh, which noted a 4.4 cm hypoechoic mass in his left inguinal region without commenting on his perceived posterior thigh mass. Also, on 5/2/22 he underwent US of his thyroid and parathyroid glands where they noticed bilateral cervical lymph nodes with the largest on the left side measuring 2.1 x 1.6 x 1.9 cm and a right level 1 LN measuring 2.2 x 1.2 x 2.3 cm. They saw 3 lymph nodes on the left side and one discrete LN on the right.\par \par  He also recently just finished a 14 day course of Levofloxacin for an uncomplicated phenomena. He was having a dry cough and underwent imaging as an outpatient which found mild left and right pleural effusions, areas of consolidation on the right and retrocardiac airspace involvement (performed 5/2/22). He now feels better without infectious complaints.\par \par In addition, he is currently anemic. He last had a colonoscopy in his recent memory. He had bleeding hemorrhoids last year treated with OTC medications. He denies any tick bites recently. \par \par He also has MGUS with 3 separate monoclonal gammopathies I suspect is related to his NHL. He has M Judd 1 showing IgG Lambda at 0.3 g/dl, M Judd 2 showing IgG Kappa at 0.2 g/dl, M Judd 3 showing IgM Lambda (unable to quantitate). There is no suspicion for myeloma or waldenstroms at this time and his M-spikes will be monitored. He has no elevation in kappa/ lambda FLC ration, but individual light chains are both elevated, kappa at 10.77 mg/dL and lambda at 6.58 mg/dL.\par \par Social Hx\par - He is currently retired. He used to be an .\par - No significant environmental exposure to chemicals\par - Lives by himself and his wife lives in [place].\par - Former smoker. He smoked for 10 years less than 1 pack a day. He quit 20 years ago\par \par Family hx\par - Two brothers non Hodgkins lymphoma. At least one required chemotherapy. sister had cervical cancer first diagnosed 2007 and then 3 years later with more cancer discovered\par - Mother and father passed away from heart disease and diabetes.\par \par =======================================================\par Care Providers:\[facility] \par PMD: Dr Amari Hickman\[facility] Endo: Dr Carter Vigil\[facility] Cardiologist: Dr. Don [phone] fax [phone]\[facility] \par =======================================================\par Contacts:\[facility] \par Vonnie (daughter): [phone] - takes all healthcare related calls\[facility] \[facility] ======================================================= [de-identified] : PENDING [de-identified] : Fine Needle Aspiration Report - Auth (Verified)\par Specimen(s) Submitted\par 1. AXILLA, LEFT, US GUIDED CORE BIOPSY AND FNA\par 2. LYMPH NODE, CERVICAL, LEFT POSTERIOR, US GUIDED CORE BIOPSY AND FNA\par \par \par Final Diagnosis\par 1. AXILLA, LEFT, US GUIDED CORE BIOPSY AND FNA\par POSITIVE FOR MALIGNANT CELLS.\par B-cell lymphoma of follicular center origin, most consistent with\par follicular lymphoma, grade 3A.  See note.\par \par Fine Needle Aspiration Addendum Report - Auth (Verified)\par \par Addendum\par 2. LYMPH NODE, CERVICAL, LEFT POSTERIOR, US GUIDED CORE BIOPSY AND FNA\par POSITIVE FOR MALIGNANT CELLS.\par B-cell lymphoma of follicular center origin with high proliferation index.\par See note.\par \par Note:\par The neoplastic B cells demonstrate a follicular center B-cell phenotype with MUM-1 co-expression and a high proliferation index.  Follicular dendritic meshwork is present in most areas of the biopsy, consistent\par with follicular lymphoma.  However, there is one focal area with increased larger cells and lack of follicular dendritic meshwork. Therefore, a high grade component can not be excluded.  If clinically indicated, suggest excisional biopsy for definitive classification.\par \par Immunohistochemical stains   (CD3, CD5, CD10, CD20, CD21, CD23, CD30, BCL-6, BCL-2, cyclinD1, ki-67, c-MYC, PAX-5, MUM-1, p53, ISAURO) were performed on block 2C.  The neoplastic cells are positive for CD20, PAX-5, BCL-6, BCL-2, MUM-1 (partial), dim p53; negative CD5, CD10, CD30, cyclinD1, ISAURO.  CD21 and CD23 highlight follicular dendritic meshwork in most areas with focal absence of follicular dendritic meshwork. \par Ki-67 proliferation index is approximately 60 to 70%.  C-MYC stains approximately 20 to 30% of cells.  CD3 and CD5 highlight some T-cells in the background. [de-identified] : 6/14/22 FNA of Left axilla LN: FLUORESCENCE IN-SITU HYBRIDIZATION (FISH)\par 1. No evidence of MYC Rearrangement\par 2. No evidence of BCL2-IGH [translocation t(14;18)] gene rearrangement\par 3. 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 [de-identified] : 5/18/22: Initial Visit.\par \par 6/20/22: Follow-up. Patient feels well overall. Lymphoma labs and left axilla LN biopsy revealed grade 3A follicular lymphoma, IgG Lambda and Kappa MGUS. He reports lymph nodes have been stable. Heart function is stable. He denies having any recent fevers, chills, nausea. No weight changes.\par \par 8/22/22: Follow-up. Patient feels well overall. Awaiting for biopsy results of lymph nodes in his back. He does not have pain in the left back. The left side of his neck gives him discomfort. He has never received chemotherapy nor antibody treatment in the past. No fevers, chills, night sweats. No new noticeable masses  Good appetite, lost 7 lbs (lost a lot of fluid weight due to diuretics). \par \par 10/17/22: Follow up. In the interim, he was hospitalized at [facility] twice (8/27-9/12 & 9/16-10/3). 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 Patient was treated with rasburicase, but developed rasburicase-triggered G6PD hemolysis. Also found to be in acute exacerbation of HFrEF and have a prolonged QTc (likely medication-induced). During hospital stay was found to have altered mental status. 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 In mid-September patient was hospitalized for Encephalopathy (+Rhinovirus/enterovirus) unrelated to the Lymphoma. Today, he feels at baseline. Notes resolution of cervical LNs, and back lesions since starting treatment. Patient denies fever, chills, night sweats, back pain, abdominal pain, chest pain, or shortness of breath. Fair appetite, weight loss due to prolonged hospitalizations.\par \par 11/17/22: Follow-up. On 10/28/22 at home was found down by his daughter found to have fever, STEPHEN and AMS and was admitted for acute metabolic encephalopathy with sepsis 2/2 pneumonia s/p 7 days zosyn with improvement. Today he presents from rehab with his daughter. He is not feeling well. He feels that he is weak and fatigued. He has been doing PT / walking around the facility. He reads when awake. He has a poor appetite, but his family is bringing in food that he likes. No fevers, chills, night sweats. No new lumps or masses.\par \par 2/6/23: Follow-up. He was readmitted [facility] 1/9/23 to 1/20/23 for malaise and cytopenias. In December 2022 he had pneumonia and COVID19 for which there has been a long recovery. Due to these complications, his treatment with chemoimmunotherapy (O-miniCHOP) was discontinued. He is currently staying in a rehab to improve his performance status. He overall feels well today and reports feeling much improved relative to Dec 2022. He eats 3 meals a day, but prefers to eat late at night. His appetite is good and his daughter brings a lot of food supplementation for him. He continues to lose weight however. No other recent illnesses. He had a PET-CT last week.\par \par 3/13/23: Followup. He has not yet rec'd revlimid but is now approved for free drug. Continues to have issues gaining weight, and reports a very good appetite. He has not lost weight at least. He also has been having right foot pain between the ankle and toes since the night of 3/6/23. He also has a rash on toes 1-3. He continues to have right lateral abdominal itchiness around a site of swelling / lump. This has not changed in the past month. He is now back home. He is able to ambulate with a walker. He is not limited by dyspnea. He had edema in the rehab which has resolved. Per family he has also been having intermittent periods where he is not himself and he becomes aggressive toward family members. This has been an ongoing issue for sometime \par \par A comprehensive review of systems was performed including constitutional, eyes, ENT, cardiovascular, respiratory, gastrointestinal, genitourinary, musculoskeletal, integumentary, neurological, psychiatric and hematologic / lymphatic. All pertinent positives are included in the H&P under interval history above and the remaining review of systems listed are negative. \par \par ====================================================\par Treatment Hx:\par \par Obinutuzumab-mini-COEP q21 days x 3 cycles (incomplete due to toxicities and patient preference to switch to more tolerable regimen)\par (Obinutuzumab modified mini-COEP: 400 mg/m² cyclophosphamide, Etoposide (40% dose reduced to 30 mg/m2 IV on day 1 and 60 mg/m2 PO on days 2 and 3 of each cycle), and 2 mg vincristine (flat dose) on day 1 of each cycle, and 100 mg prednisone on days 1–5. Modified from Man et al 2009 Blood "R-CHOP with Etoposide Substituted for Doxorubicin (R-CEOP): Excellent Outcome in Diffuse Large B Cell Lymphoma for Patients with a Contraindication to Anthracyclines." with mini- dosing for elderly patients)\par - Cycle 1 Day 1 given 9/2/22 - third dose of Obinutuzumab held due to AMS, requiring admission to [facility] found to have enterovirus infection\par - Cycle 2 Day 1 given 9/30/22 - Given as an inpatient at [facility]\par - Cycle 3 Day 1 given 10/21/22 - Complicated by pneumonia, requiring admission and rehab placement\par \par Tafa plus Revlimid (changed therapy due to patient and family's wishes due to intolerable toxicities)\par - Cycle 1 Day 1 planned in upcoming week or 2, patient's daughter to call with planned appointment time\par \par \par ==================================================== [FreeTextEntry1] : Tafa plus Revlimid 20 mg daily 21/28 days (PENDING start date due to recent hospitalization and insurance barriers)

## 2023-03-14 NOTE — CONSULT LETTER
[Dear  ___] : Dear ~ANTONIETTA, [Courtesy Letter:] : I had the pleasure of seeing your patient, [unfilled], in my office today. [Please see my note below.] : Please see my note below. [Consult Closing:] : Thank you very much for allowing me to participate in the care of this patient.  If you have any questions, please do not hesitate to contact me. [Sincerely,] : Sincerely, [FreeTextEntry3] : Jason Cordova DO, MSc\par , [facility]\par [facility]\par [facility]\par [address] [address]\par Tel: [phone]\par Fax: [phone]

## 2023-03-14 NOTE — REVIEW OF SYSTEMS
[Weight Loss (___ Lbs)] : [unfilled] ~Ulb weight loss [Lower Ext Edema] : lower extremity edema [Negative] : Heme/Lymph [SOB] : no shortness of breath [Dyspnea on exertion] : not dyspnea during exertion [Leg Claudication] : no intermittent leg claudication [Palpitations] : no palpitations [Orthopnea] : no orthopnea [PND] : no PND [FreeTextEntry9] : left 1st toe pain, no inflammation

## 2023-03-14 NOTE — CARDIOLOGY SUMMARY
[de-identified] : 6/24/2021 sinus rhythm with bi-ventricular paced rhythm\par 12/23/2021 mild sinus tachycardia, P-sense, biventricular paced.\par 5/24/2022 sinus rhythm 64,  P-sense and biventricular paced.\par 6/13/2022 sinus tachycardia 108, P-sense and biventricular pace.\par 7/6/2022  probable atrial tachycardia with biventricular pacing 2:1, heart rate 108.\par 8/2/2022 sinus tachycardia and biventricular paced at 102.\par 10/10/2022 \par 2/14/2023 atrial and biventricular paced.\par 3/14/2023 atrial and biventricular paced at 60. [de-identified] : 6/24/2021 mild to moderate global LV dysfunction with dilated and hypertrophied LV, left atrial enlargement, normal right heart with device wire, mild to moderate MR. Compared to 11/7/2019 hospital study pulmonary hypertension improved and all other findings unchanged.

## 2023-03-14 NOTE — PHYSICAL EXAM
[Ambulatory and capable of all self care but unable to carry out any work activities] : Status 2- Ambulatory and capable of all self care but unable to carry out any work activities. Up and about more than 50% of waking hours [Thin] : thin [Normal] : affect appropriate [de-identified] : Ambulating today with a walker [de-identified] : +PPM [de-identified] : right sided abdominal mass measuring 2 x 4 cm above the right lateral hip. Overlying skin irritation / thickening. No other concerning abdominal findings. [de-identified] : + right abdominal subcutaneous nodules 3 clearly palpable, feel afixed to overlying skin with an associated rash (max size ~1.5 cm); No palpable cervical adenopathy appreciated

## 2023-03-14 NOTE — DISCUSSION/SUMMARY
[Patient] : the patient [EKG obtained to assist in diagnosis and management of assessed problem(s)] : EKG obtained to assist in diagnosis and management of assessed problem(s) 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 [Cardiomyopathy] : cardiomyopathy [NYHA Class II] : NYHA Class II [Hypertensive Cardiomyopathy] : hypertensive cardiomyopathy [Echocardiogram] : echocardiogram [Resynchronization Therapy] : resynchronization therapy [Coronary Artery Disease] : coronary artery disease [Systolic Heart Failure] : systolic heart failure [Compensated] : compensated [None] : There are no changes in medication management [Essential Hypertension] : essential hypertension [Responding to Treatment] : responding to treatment [Medication Changes Per Orders] : Medication changes are as documented in orders [de-identified] : 100% bi-ventricular paced [FreeTextEntry1] : discussed need to follow up with pacemaker monitoring. He has disconnected monitoring device and missed recent EP appointment. He and daughter will call to reschedule [de-identified] : Rehab facility has him on simvastatin, corrected back to atorvastatin [de-identified] : replaced furosemide with torsemide, but with multiple hospital stays now off all diuretic and has no edema  [de-identified] : in association with multiple severe illnesses including follicular lymphoma undergoing chemotherapy blood pressure currently not elevated [de-identified] : off diuretics, on ACE-i, blood pressure satisfactory [de-identified] : Too many instances of failing to take medications, today he says it is because going to internist and instructed to not eat for blood tests [FreeTextEntry2] : and daughter

## 2023-03-15 NOTE — ED ADULT TRIAGE NOTE - CCCP TRG CHIEF CMPLNT
Subjective   History was provided by the mother.  Kyle Walsh is a 15 y.o. male who is here for this well child visit.  Immunization History   Administered Date(s) Administered    DTaP 05/06/2009, 11/04/2009, 08/03/2011    DTaP / IPV 12/03/2012    HPV, Unspecified 08/16/2021, 10/18/2021    Hep A, Unspecified 08/03/2011, 07/09/2012    Hep B, Adolescent or Pediatric 05/06/2009, 11/04/2009, 08/03/2011    Hep B, Unspecified 2008    Hib (PRP-OMP) 05/06/2009, 11/04/2009, 08/03/2011    IPV 05/06/2009, 11/04/2009, 08/03/2011    MMR 11/04/2009, 12/03/2009, 12/03/2012    Meningococcal MCV4P 08/16/2021    Pneumococcal Conjugate PCV 13 08/03/2011    Pneumococcal Conjugate PCV 7 05/06/2009, 11/04/2009    Tdap 08/16/2021    Varicella 11/04/2009, 01/08/2013, 07/07/2017     History of previous adverse reactions to immunizations? no  The following portions of the patient's history were reviewed by a provider in this encounter and updated as appropriate:       Well Child Assessment:  History was provided by the mother. Kyle lives with his mother, sister and brother. Interval problems do not include caregiver depression, recent illness or recent injury.   Nutrition  Types of intake include cereals, eggs, fruits, junk food, cow's milk, fish, juices, meats and vegetables. Junk food includes candy, chips, desserts and fast food.   Dental  The patient has a dental home. The patient brushes teeth regularly. Last dental exam was less than 6 months ago.   Elimination  Elimination problems do not include constipation. There is no bed wetting.   Behavioral  Behavioral issues do not include misbehaving with siblings or performing poorly at school. Disciplinary methods include praising good behavior and taking away privileges.   Sleep  Average sleep duration is 9 hours. The patient does not snore. There are no sleep problems.   Safety  There is no smoking in the home. Home has working smoke alarms? yes. Home has working carbon monoxide  "alarms? yes. There is no gun in home.   School  Current grade level is 8th. There are no signs of learning disabilities. Child is doing well in school.   Screening  There are no risk factors for hearing loss. There are no risk factors for anemia. There are no risk factors for dyslipidemia. There are no risk factors for tuberculosis. There are no risk factors for vision problems. There are no risk factors related to diet. There are no risk factors at school. There are no risk factors for sexually transmitted infections. There are no risk factors related to relationships. There are no risk factors related to friends or family. There are no risk factors related to emotions. There are no risk factors related to personal safety.   Social  The caregiver enjoys the child. After school, the child is at home with a parent or home with an adult. Sibling interactions are good. The child spends 2 hours in front of a screen (tv or computer) per day.       Objective   Vitals:    03/15/23 1610   BP: 142/78   BP Location: Right arm   Patient Position: Sitting   BP Cuff Size: Large adult   Pulse: 82   Temp: 35.7 °C (96.2 °F)   SpO2: 99%   Weight: 95.7 kg   Height: 1.645 m (5' 4.75\")     Growth parameters are noted and are not appropriate for age.  Physical Exam  Vitals and nursing note reviewed.   Constitutional:       Appearance: Normal appearance. He is obese. He is not toxic-appearing.   HENT:      Head: Normocephalic and atraumatic.      Right Ear: Tympanic membrane normal.      Left Ear: Tympanic membrane normal.      Nose: Nose normal.      Mouth/Throat:      Mouth: Mucous membranes are moist.      Pharynx: Oropharynx is clear.   Cardiovascular:      Rate and Rhythm: Normal rate and regular rhythm.      Pulses: Normal pulses.      Heart sounds: Normal heart sounds.   Abdominal:      General: Abdomen is flat. Bowel sounds are normal.      Palpations: Abdomen is soft.   Musculoskeletal:         General: Normal range of motion. "      Cervical back: Normal range of motion.   Skin:     General: Skin is warm.   Neurological:      General: No focal deficit present.      Mental Status: He is alert.   Psychiatric:         Mood and Affect: Mood normal.         Behavior: Behavior normal.       Assessment/Plan   Well adolescent.  1. Anticipatory guidance discussed.  Specific topics reviewed: bicycle helmets, importance of regular dental care, importance of regular exercise, importance of varied diet, limit TV, media violence, minimize junk food, and seat belts.  2.  Weight management:  The patient was counseled regarding behavior modifications, nutrition, and physical activity.  3. Development: appropriate for age  4. No orders of the defined types were placed in this encounter.    5. Follow-up visit in 1 year for next well child visit, or sooner as needed.   Sent for dehydration

## 2023-03-16 ENCOUNTER — NON-APPOINTMENT (OUTPATIENT)
Age: 73
End: 2023-03-16

## 2023-03-23 ENCOUNTER — APPOINTMENT (OUTPATIENT)
Dept: HEMATOLOGY ONCOLOGY | Facility: CLINIC | Age: 73
End: 2023-03-23

## 2023-03-23 ENCOUNTER — APPOINTMENT (OUTPATIENT)
Dept: INFUSION THERAPY | Facility: HOSPITAL | Age: 73
End: 2023-03-23

## 2023-03-30 ENCOUNTER — RESULT REVIEW (OUTPATIENT)
Age: 73
End: 2023-03-30

## 2023-03-30 ENCOUNTER — APPOINTMENT (OUTPATIENT)
Dept: INFUSION THERAPY | Facility: HOSPITAL | Age: 73
End: 2023-03-30

## 2023-03-30 ENCOUNTER — NON-APPOINTMENT (OUTPATIENT)
Age: 73
End: 2023-03-30

## 2023-03-30 ENCOUNTER — APPOINTMENT (OUTPATIENT)
Dept: HEMATOLOGY ONCOLOGY | Facility: CLINIC | Age: 73
End: 2023-03-30
Payer: MEDICARE

## 2023-03-30 LAB
ALBUMIN SERPL ELPH-MCNC: 4.3 G/DL — SIGNIFICANT CHANGE UP (ref 3.3–5)
ALP SERPL-CCNC: 122 U/L — HIGH (ref 40–120)
ALT FLD-CCNC: 20 U/L — SIGNIFICANT CHANGE UP (ref 10–45)
ANION GAP SERPL CALC-SCNC: 12 MMOL/L — SIGNIFICANT CHANGE UP (ref 5–17)
AST SERPL-CCNC: 23 U/L — SIGNIFICANT CHANGE UP (ref 10–40)
BASOPHILS # BLD AUTO: 0.06 K/UL — SIGNIFICANT CHANGE UP (ref 0–0.2)
BASOPHILS NFR BLD AUTO: 2.2 % — HIGH (ref 0–2)
BILIRUB SERPL-MCNC: 0.3 MG/DL — SIGNIFICANT CHANGE UP (ref 0.2–1.2)
BUN SERPL-MCNC: 34 MG/DL — HIGH (ref 7–23)
CALCIUM SERPL-MCNC: 9.7 MG/DL — SIGNIFICANT CHANGE UP (ref 8.4–10.5)
CHLORIDE SERPL-SCNC: 106 MMOL/L — SIGNIFICANT CHANGE UP (ref 96–108)
CO2 SERPL-SCNC: 27 MMOL/L — SIGNIFICANT CHANGE UP (ref 22–31)
CREAT SERPL-MCNC: 1.06 MG/DL — SIGNIFICANT CHANGE UP (ref 0.5–1.3)
EGFR: 75 ML/MIN/1.73M2 — SIGNIFICANT CHANGE UP
EOSINOPHIL # BLD AUTO: 0.48 K/UL — SIGNIFICANT CHANGE UP (ref 0–0.5)
EOSINOPHIL NFR BLD AUTO: 17.7 % — HIGH (ref 0–6)
GLUCOSE SERPL-MCNC: 111 MG/DL — HIGH (ref 70–99)
HCT VFR BLD CALC: 35.6 % — LOW (ref 39–50)
HGB BLD-MCNC: 12.1 G/DL — LOW (ref 13–17)
IMM GRANULOCYTES NFR BLD AUTO: 1.5 % — HIGH (ref 0–0.9)
LDH SERPL L TO P-CCNC: 326 U/L — HIGH (ref 50–242)
LYMPHOCYTES # BLD AUTO: 0.42 K/UL — LOW (ref 1–3.3)
LYMPHOCYTES # BLD AUTO: 15.5 % — SIGNIFICANT CHANGE UP (ref 13–44)
MAGNESIUM SERPL-MCNC: 2 MG/DL — SIGNIFICANT CHANGE UP (ref 1.6–2.6)
MCHC RBC-ENTMCNC: 32 PG — SIGNIFICANT CHANGE UP (ref 27–34)
MCHC RBC-ENTMCNC: 34 G/DL — SIGNIFICANT CHANGE UP (ref 32–36)
MCV RBC AUTO: 94.2 FL — SIGNIFICANT CHANGE UP (ref 80–100)
MONOCYTES # BLD AUTO: 0.63 K/UL — SIGNIFICANT CHANGE UP (ref 0–0.9)
MONOCYTES NFR BLD AUTO: 23.2 % — HIGH (ref 2–14)
NEUTROPHILS # BLD AUTO: 1.08 K/UL — LOW (ref 1.8–7.4)
NEUTROPHILS NFR BLD AUTO: 39.9 % — LOW (ref 43–77)
NRBC # BLD: 0 /100 WBCS — SIGNIFICANT CHANGE UP (ref 0–0)
PHOSPHATE SERPL-MCNC: 4.6 MG/DL — HIGH (ref 2.5–4.5)
PLATELET # BLD AUTO: 144 K/UL — LOW (ref 150–400)
POTASSIUM SERPL-MCNC: 4.8 MMOL/L — SIGNIFICANT CHANGE UP (ref 3.5–5.3)
POTASSIUM SERPL-SCNC: 4.8 MMOL/L — SIGNIFICANT CHANGE UP (ref 3.5–5.3)
PROT SERPL-MCNC: 6.9 G/DL — SIGNIFICANT CHANGE UP (ref 6–8.3)
RBC # BLD: 3.78 M/UL — LOW (ref 4.2–5.8)
RBC # FLD: 14.4 % — SIGNIFICANT CHANGE UP (ref 10.3–14.5)
SODIUM SERPL-SCNC: 144 MMOL/L — SIGNIFICANT CHANGE UP (ref 135–145)
TSH SERPL-MCNC: 1.25 UIU/ML — SIGNIFICANT CHANGE UP (ref 0.27–4.2)
URATE SERPL-MCNC: 6.8 MG/DL — SIGNIFICANT CHANGE UP (ref 3.4–8.8)
WBC # BLD: 2.71 K/UL — LOW (ref 3.8–10.5)
WBC # FLD AUTO: 2.71 K/UL — LOW (ref 3.8–10.5)

## 2023-03-30 PROCEDURE — 99214 OFFICE O/P EST MOD 30 MIN: CPT

## 2023-03-30 NOTE — PHYSICAL EXAM
[Ambulatory and capable of all self care but unable to carry out any work activities] : Status 2- Ambulatory and capable of all self care but unable to carry out any work activities. Up and about more than 50% of waking hours [Thin] : thin [Normal] : affect appropriate [de-identified] : Ambulating today with a walker [de-identified] : +PPM [de-identified] : right sided abdominal mass measuring 2 x 4 cm above the right lateral hip. Overlying skin irritation / thickening. No other concerning abdominal findings. [de-identified] : + right abdominal subcutaneous nodules 3 clearly palpable, feel afixed to overlying skin with an associated rash (max size ~1.5 cm); No palpable cervical adenopathy appreciated

## 2023-03-30 NOTE — ASSESSMENT
[FreeTextEntry1] : 73 yo male with PMHx significant for follicular lymphoma with DLBCL (tx with R-CHOP in 2003, with relapse in 2009, treated with BR) with his initial presentation at [facility] with multiple areas of palpable lymphadenopathy with follicular lymphoma grade IIIA (IPI score 3) in the setting of a steady decline in weight > 20 lbs in the last 6 months without other constitutional complaints. 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 \par \par He underwent PET-CT in Aug 2022 which showed FDG avid lymph nodes in the left cervical, bilateral supraclavicular regions, and chest, and a few FDG avid abdominal lymph nodes, intramuscular masses in the left posterior chest and left upper thigh, scattered FDG avid subcutaneous soft tissue/nodules with trace right pleural effusion, moderate left pleural effusion, loculated fluid in the right middle lobe. Moderate abdominal and pelvic ascites. Subcutaneous edema in the lower abdominal wall extending into the imaged bilateral thighs. Splenomegaly with mild FDG avidity, suspicious for lymphomatous involvement.\par \par Prior to the PET-CT, he underwent biopsy of both a cervical lymph node and a left axilla lymph node. 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 There were areas of > 20 SUV on the PET-CT, repeat whole lymph node biopsy was requested to confirm suspected diagnosis of Grade 3B FL or transformed large cell lymphoma. S/p excisional biopsy of back lesion on 9/7/22 which showed recurrent DLBCL.\par \par LP 9/26/22: protein elevated at 61, LDH 27, neutrophils 0, lymphocytes 33, monocytes 67, RBC 5. Oligoclonal bands negative. The flow cytometry failed due to insufficient cells. Would consider positive and recommend continued IT MTX therapy going forward x 4 total cycles. He had an interim PET-CT performed on 2/1/23 (3 months after discontinuation of O-miniCHOP in the middle of his therapy - received 3/6 cycles) which showed possible persistent disease including a small, residual focus of increased FDG activity in the left level II region, likely corresponding to a difficult to delineate lymph node, is decreased in size and metabolism (SUV 3.5; image 33; previous SUV 16.1 and skin thickening and subcutaneous nodules, right lower anterior and lateral abdominal wall, slightly more extensive and similar in metabolism (SUV 7.1; image 170; previous SUV 5.5). \par \par We discussed the relative response to treatment at this point and the need for additional therapy. Will begin Tafasitamab (anti-CD19; difficult for family to come in weekly) + lenalidomide. Today is Cycle 1 Day 1 of Tafasitamab + lenalidomide.\par \par Plan:\par - Repeat Lymphoma labs today\par - Received Revlimid approval for free drug through to Dec 2023; awaiting Revlimid delivery. \par - VTE Ppx while on Revlimid; on Eliquis for cardiac issues.\par - Antiviral Ppx: Acyclovir 400mg BID; patient non-complaint.\par - Start Tafasitamab (12 mg/kg weekly for cycle 1-3 then biweekly thereafter) + lenalidomide 20 mg daily 21/28 days, weekly infusions may be too difficult for the patient's family, who need to accompany him due to suspected dementia and behavioral issues \par - Alternative therapy would be Obinutuzumab 1000 mg every 28 days + lenalidomide 20 mg daily 21/28 days\par - Discontinued chemoimmunotherapy with Obi-miniCHOP in Nov 2022.\par - Behavioral issues: Suspect dementia as he has had fairly consistent times of confusion and aggressive behavior over the past 7 months at least. Recommend psychiatric and neurology follow-up, however we explained that we are limited to treatment of his cancer and he will need to see other specialists to address behavior issues and the question as to possible worsening dementia.\par - COVID vaccine (Pfizer: 3/2/21 & 3/23/21) and booster 9/21/21 and Oct 2022.\par - Follow up in 2 weeks\par \par Case and management discussed with Dr. Cordova\par ___\par I personally have spent a total of 30 minutes of time on the date of this encounter reviewing test results, documenting findings, providing education, coordinating care and directly consulting with the patient and/or designated family member.

## 2023-03-30 NOTE — HISTORY OF PRESENT ILLNESS
[Disease:__________________________] : Disease: [unfilled] [Treatment Protocol] : Treatment Protocol [de-identified] : Initial Presentation:\par \par 71 yo with MHx significant for Atrial flutter (on Apixaban), HTN, here for further evaluation of low-level neutrophilia (since 1/2022) and lymphadenopathy. Previously NHL (around 9929-0789?). He received chemotherapy in 2004. 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 In 2010 he went to [place] and was treated for reoccurrence and was treated with bendamustine. He reports that he has had areas of swelling in his neck on and off for about 2 years which was mostly undergoing workup with his endocrinologist. In the last few months he has noticed increasing size of his left cervical LNs and a right nodule that caused swelling of his face, which has now spontaneously decreased in size significantly.\par \par Today he reports he feels well outside of weight loss. He denies any other constitutional complaints. He weighed 192 lbs in 10/2021 which was down to 183 lbs in December 12/2021(2 months later) which has further decreased down to 179 lbs today. He has not felt himself masses outside of his neck region. On 5/14/22, he went for US duplex of his left lower extremity due to a "tangerine size lump" on the back of his left thigh, which noted a 4.4 cm hypoechoic mass in his left inguinal region without commenting on his perceived posterior thigh mass. Also, on 5/2/22 he underwent US of his thyroid and parathyroid glands where they noticed bilateral cervical lymph nodes with the largest on the left side measuring 2.1 x 1.6 x 1.9 cm and a right level 1 LN measuring 2.2 x 1.2 x 2.3 cm. They saw 3 lymph nodes on the left side and one discrete LN on the right.\par \par  He also recently just finished a 14 day course of Levofloxacin for an uncomplicated phenomena. He was having a dry cough and underwent imaging as an outpatient which found mild left and right pleural effusions, areas of consolidation on the right and retrocardiac airspace involvement (performed 5/2/22). He now feels better without infectious complaints.\par \par In addition, he is currently anemic. He last had a colonoscopy in his recent memory. He had bleeding hemorrhoids last year treated with OTC medications. He denies any tick bites recently. \par \par He also has MGUS with 3 separate monoclonal gammopathies I suspect is related to his NHL. He has M Judd 1 showing IgG Lambda at 0.3 g/dl, M Judd 2 showing IgG Kappa at 0.2 g/dl, M Judd 3 showing IgM Lambda (unable to quantitate). There is no suspicion for myeloma or waldenstroms at this time and his M-spikes will be monitored. He has no elevation in kappa/ lambda FLC ration, but individual light chains are both elevated, kappa at 10.77 mg/dL and lambda at 6.58 mg/dL.\par \par Social Hx\par - He is currently retired. He used to be an .\par - No significant environmental exposure to chemicals\par - Lives by himself and his wife lives in [place].\par - Former smoker. He smoked for 10 years less than 1 pack a day. He quit 20 years ago\par \par Family hx\par - Two brothers non Hodgkins lymphoma. At least one required chemotherapy. sister had cervical cancer first diagnosed 2007 and then 3 years later with more cancer discovered\par - Mother and father passed away from heart disease and diabetes.\par \par =======================================================\par Care Providers:\[facility] \par PMD: Dr Amari Hickman\[facility] Endo: Dr Carter Vigil\[facility] Cardiologist: Dr. Don [phone] fax [phone]\[facility] \par =======================================================\par Contacts:\[facility] \par Vonnie (daughter): [phone] - takes all healthcare related calls\[facility] \[facility] ======================================================= [de-identified] : PENDING [de-identified] : Fine Needle Aspiration Report - Auth (Verified)\par Specimen(s) Submitted\par 1. AXILLA, LEFT, US GUIDED CORE BIOPSY AND FNA\par 2. LYMPH NODE, CERVICAL, LEFT POSTERIOR, US GUIDED CORE BIOPSY AND FNA\par \par \par Final Diagnosis\par 1. AXILLA, LEFT, US GUIDED CORE BIOPSY AND FNA\par POSITIVE FOR MALIGNANT CELLS.\par B-cell lymphoma of follicular center origin, most consistent with\par follicular lymphoma, grade 3A.  See note.\par \par Fine Needle Aspiration Addendum Report - Auth (Verified)\par \par Addendum\par 2. LYMPH NODE, CERVICAL, LEFT POSTERIOR, US GUIDED CORE BIOPSY AND FNA\par POSITIVE FOR MALIGNANT CELLS.\par B-cell lymphoma of follicular center origin with high proliferation index.\par See note.\par \par Note:\par The neoplastic B cells demonstrate a follicular center B-cell phenotype with MUM-1 co-expression and a high proliferation index.  Follicular dendritic meshwork is present in most areas of the biopsy, consistent\par with follicular lymphoma.  However, there is one focal area with increased larger cells and lack of follicular dendritic meshwork. Therefore, a high grade component can not be excluded.  If clinically indicated, suggest excisional biopsy for definitive classification.\par \par Immunohistochemical stains   (CD3, CD5, CD10, CD20, CD21, CD23, CD30, BCL-6, BCL-2, cyclinD1, ki-67, c-MYC, PAX-5, MUM-1, p53, ISAURO) were performed on block 2C.  The neoplastic cells are positive for CD20, PAX-5, BCL-6, BCL-2, MUM-1 (partial), dim p53; negative CD5, CD10, CD30, cyclinD1, ISAURO.  CD21 and CD23 highlight follicular dendritic meshwork in most areas with focal absence of follicular dendritic meshwork. \par Ki-67 proliferation index is approximately 60 to 70%.  C-MYC stains approximately 20 to 30% of cells.  CD3 and CD5 highlight some T-cells in the background. [de-identified] : 6/14/22 FNA of Left axilla LN: FLUORESCENCE IN-SITU HYBRIDIZATION (FISH)\par 1. No evidence of MYC Rearrangement\par 2. No evidence of BCL2-IGH [translocation t(14;18)] gene rearrangement\par 3. 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 [FreeTextEntry1] : Tafa plus Revlimid 20 mg daily 21/28 days (PENDING start date due to recent hospitalization and insurance barriers) [de-identified] : 5/18/22: Initial Visit.\par \par 6/20/22: Follow-up. Patient feels well overall. Lymphoma labs and left axilla LN biopsy revealed grade 3A follicular lymphoma, IgG Lambda and Kappa MGUS. He reports lymph nodes have been stable. Heart function is stable. He denies having any recent fevers, chills, nausea. No weight changes.\par \par 8/22/22: Follow-up. Patient feels well overall. Awaiting for biopsy results of lymph nodes in his back. He does not have pain in the left back. The left side of his neck gives him discomfort. He has never received chemotherapy nor antibody treatment in the past. No fevers, chills, night sweats. No new noticeable masses  Good appetite, lost 7 lbs (lost a lot of fluid weight due to diuretics). \par \par 10/17/22: Follow up. In the interim, he was hospitalized at [facility] twice (8/27-9/12 & 9/16-10/3). 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 Patient was treated with rasburicase, but developed rasburicase-triggered G6PD hemolysis. Also found to be in acute exacerbation of HFrEF and have a prolonged QTc (likely medication-induced). During hospital stay was found to have altered mental status. 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 In mid-September patient was hospitalized for Encephalopathy (+Rhinovirus/enterovirus) unrelated to the Lymphoma. Today, he feels at baseline. Notes resolution of cervical LNs, and back lesions since starting treatment. Patient denies fever, chills, night sweats, back pain, abdominal pain, chest pain, or shortness of breath. Fair appetite, weight loss due to prolonged hospitalizations.\par \par 11/17/22: Follow-up. On 10/28/22 at home was found down by his daughter found to have fever, STEPHEN and AMS and was admitted for acute metabolic encephalopathy with sepsis 2/2 pneumonia s/p 7 days zosyn with improvement. Today he presents from rehab with his daughter. He is not feeling well. He feels that he is weak and fatigued. He has been doing PT / walking around the facility. He reads when awake. He has a poor appetite, but his family is bringing in food that he likes. No fevers, chills, night sweats. No new lumps or masses.\par \par 2/6/23: Follow-up. He was readmitted [facility] 1/9/23 to 1/20/23 for malaise and cytopenias. In December 2022 he had pneumonia and COVID19 for which there has been a long recovery. Due to these complications, his treatment with chemoimmunotherapy (O-miniCHOP) was discontinued. He is currently staying in a rehab to improve his performance status. He overall feels well today and reports feeling much improved relative to Dec 2022. He eats 3 meals a day, but prefers to eat late at night. His appetite is good and his daughter brings a lot of food supplementation for him. He continues to lose weight however. No other recent illnesses. He had a PET-CT last week.\par \par 3/13/23: Followup. He has not yet rec'd revlimid but is now approved for free drug. Continues to have issues gaining weight, and reports a very good appetite. He has not lost weight at least. He also has been having right foot pain between the ankle and toes since the night of 3/6/23. He also has a rash on toes 1-3. He continues to have right lateral abdominal itchiness around a site of swelling / lump. This has not changed in the past month. He is now back home. He is able to ambulate with a walker. He is not limited by dyspnea. He had edema in the rehab which has resolved. Per family he has also been having intermittent periods where he is not himself and he becomes aggressive toward family members. This has been an ongoing issue for sometime \par \par 3/30/23: Follow-up. Today is Cycle 1 Day 1 of Tafa. He has not yet rec'd revlimid (expected delivery today); is approved for free drug via [employer]. Reports intentional weight gain over the past couple of weeks and ambulating with a walker. Continues to have right foot pain between the ankle and toes since the night of 3/6/23, and have right lateral abdominal itchiness around a site of swelling / lump. This has not changed in the past month. He is now back home. Per family he has intermittent periods where he is not himself and he becomes aggressive toward family members. \par \par A comprehensive review of systems was performed including constitutional, eyes, ENT, cardiovascular, respiratory, gastrointestinal, genitourinary, musculoskeletal, integumentary, neurological, psychiatric and hematologic / lymphatic. All pertinent positives are included in the H&P under interval history above and the remaining review of systems listed are negative. \par \par ====================================================\par Treatment Hx:\par \par Obinutuzumab-mini-COEP q21 days x 3 cycles (incomplete due to toxicities and patient preference to switch to more tolerable regimen)\par (Obinutuzumab modified mini-COEP: 400 mg/m² cyclophosphamide, Etoposide (40% dose reduced to 30 mg/m2 IV on day 1 and 60 mg/m2 PO on days 2 and 3 of each cycle), and 2 mg vincristine (flat dose) on day 1 of each cycle, and 100 mg prednisone on days 1–5. Modified from Man et al 2009 Blood "R-CHOP with Etoposide Substituted for Doxorubicin (R-CEOP): Excellent Outcome in Diffuse Large B Cell Lymphoma for Patients with a Contraindication to Anthracyclines." with mini- dosing for elderly patients)\par - Cycle 1 Day 1 given 9/2/22 - third dose of Obinutuzumab held due to AMS, requiring admission to [facility] found to have enterovirus infection\par - Cycle 2 Day 1 given 9/30/22 - Given as an inpatient at [facility]\par - Cycle 3 Day 1 given 10/21/22 - Complicated by pneumonia, requiring admission and rehab placement\par \par Tafasitamab plus Revlimid (changed therapy due to patient and family's wishes due to intolerable toxicities)\par - Cycle 1 Day 1 on 3/30/23\par \par \par ====================================================

## 2023-03-31 DIAGNOSIS — C85.10 UNSPECIFIED B-CELL LYMPHOMA, UNSPECIFIED SITE: ICD-10-CM

## 2023-03-31 DIAGNOSIS — R11.2 NAUSEA WITH VOMITING, UNSPECIFIED: ICD-10-CM

## 2023-03-31 DIAGNOSIS — Z51.11 ENCOUNTER FOR ANTINEOPLASTIC CHEMOTHERAPY: ICD-10-CM

## 2023-04-06 ENCOUNTER — APPOINTMENT (OUTPATIENT)
Dept: HEMATOLOGY ONCOLOGY | Facility: CLINIC | Age: 73
End: 2023-04-06
Payer: MEDICARE

## 2023-04-06 ENCOUNTER — RESULT REVIEW (OUTPATIENT)
Age: 73
End: 2023-04-06

## 2023-04-06 ENCOUNTER — APPOINTMENT (OUTPATIENT)
Dept: INFUSION THERAPY | Facility: HOSPITAL | Age: 73
End: 2023-04-06

## 2023-04-06 LAB
BASOPHILS # BLD AUTO: 0 K/UL — SIGNIFICANT CHANGE UP (ref 0–0.2)
BASOPHILS NFR BLD AUTO: 0 % — SIGNIFICANT CHANGE UP (ref 0–2)
EOSINOPHIL # BLD AUTO: 0.32 K/UL — SIGNIFICANT CHANGE UP (ref 0–0.5)
EOSINOPHIL NFR BLD AUTO: 17 % — HIGH (ref 0–6)
HCT VFR BLD CALC: 35.5 % — LOW (ref 39–50)
HGB BLD-MCNC: 11.9 G/DL — LOW (ref 13–17)
LG PLATELETS BLD QL AUTO: SLIGHT — SIGNIFICANT CHANGE UP
LYMPHOCYTES # BLD AUTO: 0.47 K/UL — LOW (ref 1–3.3)
LYMPHOCYTES # BLD AUTO: 25 % — SIGNIFICANT CHANGE UP (ref 13–44)
MCHC RBC-ENTMCNC: 31 PG — SIGNIFICANT CHANGE UP (ref 27–34)
MCHC RBC-ENTMCNC: 33.5 G/DL — SIGNIFICANT CHANGE UP (ref 32–36)
MCV RBC AUTO: 92.4 FL — SIGNIFICANT CHANGE UP (ref 80–100)
MONOCYTES # BLD AUTO: 0.45 K/UL — SIGNIFICANT CHANGE UP (ref 0–0.9)
MONOCYTES NFR BLD AUTO: 24 % — HIGH (ref 2–14)
NEUTROPHILS # BLD AUTO: 0.64 K/UL — LOW (ref 1.8–7.4)
NEUTROPHILS NFR BLD AUTO: 34 % — LOW (ref 43–77)
NRBC # BLD: 0 /100 — SIGNIFICANT CHANGE UP (ref 0–0)
NRBC # BLD: SIGNIFICANT CHANGE UP /100 WBCS (ref 0–0)
PLAT MORPH BLD: ABNORMAL
PLATELET # BLD AUTO: 108 K/UL — LOW (ref 150–400)
RBC # BLD: 3.84 M/UL — LOW (ref 4.2–5.8)
RBC # FLD: 13.8 % — SIGNIFICANT CHANGE UP (ref 10.3–14.5)
RBC BLD AUTO: SIGNIFICANT CHANGE UP
WBC # BLD: 1.88 K/UL — LOW (ref 3.8–10.5)
WBC # FLD AUTO: 1.88 K/UL — LOW (ref 3.8–10.5)

## 2023-04-06 PROCEDURE — 99214 OFFICE O/P EST MOD 30 MIN: CPT

## 2023-04-06 NOTE — PHYSICAL EXAM
[Ambulatory and capable of all self care but unable to carry out any work activities] : Status 2- Ambulatory and capable of all self care but unable to carry out any work activities. Up and about more than 50% of waking hours [Thin] : thin [Normal] : affect appropriate [de-identified] : Ambulating today with a walker [de-identified] : +PPM [de-identified] : right sided abdominal mass measuring 2 x 4 cm above the right lateral hip. Overlying skin irritation / thickening. No other concerning abdominal findings. [de-identified] : + right abdominal subcutaneous nodules 3 clearly palpable, feel fixed to overlying skin with an associated rash (max size ~1.5 cm); No palpable cervical adenopathy appreciated

## 2023-04-06 NOTE — HISTORY OF PRESENT ILLNESS
[Disease:__________________________] : Disease: [unfilled] [Treatment Protocol] : Treatment Protocol [de-identified] : Initial Presentation:\par \par 73 yo with MHx significant for Atrial flutter (on Apixaban), HTN, here for further evaluation of low-level neutrophilia (since 1/2022) and lymphadenopathy. Previously NHL (around 5590-5586?). He received chemotherapy in 2004. 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 In 2010 he went to [facility] and was treated for reoccurrence and was treated with bendamustine. He reports that he has had areas of swelling in his neck on and off for about 2 years which was mostly undergoing workup with his endocrinologist. In the last few months he has noticed increasing size of his left cervical LNs and a right nodule that caused swelling of his face, which has now spontaneously decreased in size significantly.\par \par Today he reports he feels well outside of weight loss. He denies any other constitutional complaints. He weighed 192 lbs in 10/2021 which was down to 183 lbs in December 12/2021(2 months later) which has further decreased down to 179 lbs today. He has not felt himself masses outside of his neck region. On 5/14/22, he went for US duplex of his left lower extremity due to a "tangerine size lump" on the back of his left thigh, which noted a 4.4 cm hypoechoic mass in his left inguinal region without commenting on his perceived posterior thigh mass. Also, on 5/2/22 he underwent US of his thyroid and parathyroid glands where they noticed bilateral cervical lymph nodes with the largest on the left side measuring 2.1 x 1.6 x 1.9 cm and a right level 1 LN measuring 2.2 x 1.2 x 2.3 cm. They saw 3 lymph nodes on the left side and one discrete LN on the right.\par \par  He also recently just finished a 14 day course of Levofloxacin for an uncomplicated phenomena. He was having a dry cough and underwent imaging as an outpatient which found mild left and right pleural effusions, areas of consolidation on the right and retrocardiac airspace involvement (performed 5/2/22). He now feels better without infectious complaints.\par \par In addition, he is currently anemic. He last had a colonoscopy in his recent memory. He had bleeding hemorrhoids last year treated with OTC medications. He denies any tick bites recently. \par \par He also has MGUS with 3 separate monoclonal gammopathies I suspect is related to his NHL. He has M Judd 1 showing IgG Lambda at 0.3 g/dl, M Judd 2 showing IgG Kappa at 0.2 g/dl, M Judd 3 showing IgM Lambda (unable to quantitate). There is no suspicion for myeloma or waldenstroms at this time and his M-spikes will be monitored. He has no elevation in kappa/ lambda FLC ration, but individual light chains are both elevated, kappa at 10.77 mg/dL and lambda at 6.58 mg/dL.\par \par Social Hx\par - He is currently retired. He used to be an .\par - No significant environmental exposure to chemicals\par - Lives by himself and his wife lives in [place].\par - Former smoker. He smoked for 10 years less than 1 pack a day. He quit 20 years ago\par \par Family hx\par - Two brothers non Hodgkins lymphoma. At least one required chemotherapy. sister had cervical cancer first diagnosed 2007 and then 3 years later with more cancer discovered\par - Mother and father passed away from heart disease and diabetes.\par \par =======================================================\par Care Providers:\[facility] \par PMD: Dr Amari Hickman\[facility] Endo: Dr Carter Vigil\[facility] Cardiologist: Dr. Don [phone] fax [phone]\[facility] \par =======================================================\par Contacts:\[facility] \par Vonnie (daughter): [phone] - takes all healthcare related calls\[facility] \[facility] ======================================================= [de-identified] : PENDING [de-identified] : Fine Needle Aspiration Report - Auth (Verified)\par Specimen(s) Submitted\par 1. AXILLA, LEFT, US GUIDED CORE BIOPSY AND FNA\par 2. LYMPH NODE, CERVICAL, LEFT POSTERIOR, US GUIDED CORE BIOPSY AND FNA\par \par \par Final Diagnosis\par 1. AXILLA, LEFT, US GUIDED CORE BIOPSY AND FNA\par POSITIVE FOR MALIGNANT CELLS.\par B-cell lymphoma of follicular center origin, most consistent with\par follicular lymphoma, grade 3A.  See note.\par \par Fine Needle Aspiration Addendum Report - Auth (Verified)\par \par Addendum\par 2. LYMPH NODE, CERVICAL, LEFT POSTERIOR, US GUIDED CORE BIOPSY AND FNA\par POSITIVE FOR MALIGNANT CELLS.\par B-cell lymphoma of follicular center origin with high proliferation index.\par See note.\par \par Note:\par The neoplastic B cells demonstrate a follicular center B-cell phenotype with MUM-1 co-expression and a high proliferation index.  Follicular dendritic meshwork is present in most areas of the biopsy, consistent\par with follicular lymphoma.  However, there is one focal area with increased larger cells and lack of follicular dendritic meshwork. Therefore, a high grade component can not be excluded.  If clinically indicated, suggest excisional biopsy for definitive classification.\par \par Immunohistochemical stains   (CD3, CD5, CD10, CD20, CD21, CD23, CD30, BCL-6, BCL-2, cyclinD1, ki-67, c-MYC, PAX-5, MUM-1, p53, ISAURO) were performed on block 2C.  The neoplastic cells are positive for CD20, PAX-5, BCL-6, BCL-2, MUM-1 (partial), dim p53; negative CD5, CD10, CD30, cyclinD1, ISAURO.  CD21 and CD23 highlight follicular dendritic meshwork in most areas with focal absence of follicular dendritic meshwork. \par Ki-67 proliferation index is approximately 60 to 70%.  C-MYC stains approximately 20 to 30% of cells.  CD3 and CD5 highlight some T-cells in the background. [de-identified] : 6/14/22 FNA of Left axilla LN: FLUORESCENCE IN-SITU HYBRIDIZATION (FISH)\par 1. No evidence of MYC Rearrangement\par 2. No evidence of BCL2-IGH [translocation t(14;18)] gene rearrangement\par 3. 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 [FreeTextEntry1] : Tafa plus Revlimid 20 mg daily 21/28 days (PENDING start date due to recent hospitalization and insurance barriers) [de-identified] : 5/18/22: Initial Visit.\par \par 6/20/22: Follow-up. Patient feels well overall. Lymphoma labs and left axilla LN biopsy revealed grade 3A follicular lymphoma, IgG Lambda and Kappa MGUS. He reports lymph nodes have been stable. Heart function is stable. He denies having any recent fevers, chills, nausea. No weight changes.\par \par 8/22/22: Follow-up. Patient feels well overall. Awaiting for biopsy results of lymph nodes in his back. He does not have pain in the left back. The left side of his neck gives him discomfort. He has never received chemotherapy nor antibody treatment in the past. No fevers, chills, night sweats. No new noticeable masses  Good appetite, lost 7 lbs (lost a lot of fluid weight due to diuretics). \par \par 10/17/22: Follow up. In the interim, he was hospitalized at [facility] twice (8/27-9/12 & 9/16-10/3). 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 Patient was treated with rasburicase, but developed rasburicase-triggered G6PD hemolysis. Also found to be in acute exacerbation of HFrEF and have a prolonged QTc (likely medication-induced). During hospital stay was found to have altered mental status. 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 In mid-September patient was hospitalized for Encephalopathy (+Rhinovirus/enterovirus) unrelated to the Lymphoma. Today, he feels at baseline. Notes resolution of cervical LNs, and back lesions since starting treatment. Patient denies fever, chills, night sweats, back pain, abdominal pain, chest pain, or shortness of breath. Fair appetite, weight loss due to prolonged hospitalizations.\par \par 11/17/22: Follow-up. On 10/28/22 at home was found down by his daughter found to have fever, STEPHEN and AMS and was admitted for acute metabolic encephalopathy with sepsis 2/2 pneumonia s/p 7 days zosyn with improvement. Today he presents from rehab with his daughter. He is not feeling well. He feels that he is weak and fatigued. He has been doing PT / walking around the facility. He reads when awake. He has a poor appetite, but his family is bringing in food that he likes. No fevers, chills, night sweats. No new lumps or masses.\par \par 2/6/23: Follow-up. He was readmitted [facility] 1/9/23 to 1/20/23 for malaise and cytopenias. In December 2022 he had pneumonia and COVID19 for which there has been a long recovery. Due to these complications, his treatment with chemoimmunotherapy (O-miniCHOP) was discontinued. He is currently staying in a rehab to improve his performance status. He overall feels well today and reports feeling much improved relative to Dec 2022. He eats 3 meals a day, but prefers to eat late at night. His appetite is good and his daughter brings a lot of food supplementation for him. He continues to lose weight however. No other recent illnesses. He had a PET-CT last week.\par \par 3/13/23: Followup. He has not yet rec'd revlimid but is now approved for free drug. Continues to have issues gaining weight, and reports a very good appetite. He has not lost weight at least. He also has been having right foot pain between the ankle and toes since the night of 3/6/23. He also has a rash on toes 1-3. He continues to have right lateral abdominal itchiness around a site of swelling / lump. This has not changed in the past month. He is now back home. He is able to ambulate with a walker. He is not limited by dyspnea. He had edema in the rehab which has resolved. Per family he has also been having intermittent periods where he is not himself and he becomes aggressive toward family members. This has been an ongoing issue for sometime \par \par 3/30/23: Follow-up. Today is Cycle 1 Day 1 of Tafa. He has not yet rec'd revlimid (expected delivery today); is approved for free drug via [employer]. Reports intentional weight gain over the past couple of weeks and ambulating with a walker. Continues to have right foot pain between the ankle and toes since the night of 3/6/23, and have right lateral abdominal itchiness around a site of swelling / lump. This has not changed in the past month. He is now back home. Per family he has intermittent periods where he is not himself and he becomes aggressive toward family members. \par \par 4/6/23: Follow-up. Today is Cycle 1 Day 8 of Tafasitamab. Did receive revlimid on day 1 and has been tolerating Revlimid well. Denies any abdominal issues, continues to have a good appetite. Ambulating with a walker but continues to have right foot pain, and have right lateral abdominal itchiness around a site of swelling / lump. Per family he has intermittent periods where he is not himself and he becomes aggressive toward family members. \par \par A comprehensive review of systems was performed including constitutional, eyes, ENT, cardiovascular, respiratory, gastrointestinal, genitourinary, musculoskeletal, integumentary, neurological, psychiatric and hematologic / lymphatic. All pertinent positives are included in the H&P under interval history above and the remaining review of systems listed are negative. \par \par ====================================================\par Treatment Hx:\par \par Obinutuzumab-mini-COEP q21 days x 3 cycles (incomplete due to toxicities and patient preference to switch to more tolerable regimen)\par (Obinutuzumab modified mini-COEP: 400 mg/m² cyclophosphamide, Etoposide (40% dose reduced to 30 mg/m2 IV on day 1 and 60 mg/m2 PO on days 2 and 3 of each cycle), and 2 mg vincristine (flat dose) on day 1 of each cycle, and 100 mg prednisone on days 1–5. Modified from Man et al 2009 Blood "R-CHOP with Etoposide Substituted for Doxorubicin (R-CEOP): Excellent Outcome in Diffuse Large B Cell Lymphoma for Patients with a Contraindication to Anthracyclines." with mini- dosing for elderly patients)\par - Cycle 1 Day 1 given 9/2/22 - third dose of Obinutuzumab held due to AMS, requiring admission to [facility] found to have enterovirus infection\par - Cycle 2 Day 1 given 9/30/22 - Given as an inpatient at [facility]\par - Cycle 3 Day 1 given 10/21/22 - Complicated by pneumonia, requiring admission and rehab placement\par \par Tafasitamab plus Revlimid (changed therapy due to patient and family's wishes due to intolerable toxicities)\par - Cycle 1 Day 1 on 3/30/23\par \par \par ====================================================

## 2023-04-06 NOTE — ASSESSMENT
[FreeTextEntry1] : 71 yo male with PMHx significant for follicular lymphoma with DLBCL (tx with R-CHOP in 2003, with relapse in 2009, treated with BR) with his initial presentation at [email or web address] with multiple areas of palpable lymphadenopathy with follicular lymphoma grade IIIA (IPI score 3) in the setting of a steady decline in weight > 20 lbs in the last 6 months without other constitutional complaints. 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 \par \par He underwent PET-CT in Aug 2022 which showed FDG avid lymph nodes in the left cervical, bilateral supraclavicular regions, and chest, and a few FDG avid abdominal lymph nodes, intramuscular masses in the left posterior chest and left upper thigh, scattered FDG avid subcutaneous soft tissue/nodules with trace right pleural effusion, moderate left pleural effusion, loculated fluid in the right middle lobe. Moderate abdominal and pelvic ascites. Subcutaneous edema in the lower abdominal wall extending into the imaged bilateral thighs. Splenomegaly with mild FDG avidity, suspicious for lymphomatous involvement.\par \par Prior to the PET-CT, he underwent biopsy of both a cervical lymph node and a left axilla lymph node. 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 There were areas of > 20 SUV on the PET-CT, repeat whole lymph node biopsy was requested to confirm suspected diagnosis of Grade 3B FL or transformed large cell lymphoma. S/p excisional biopsy of back lesion on 9/7/22 which showed recurrent DLBCL.\par \par LP 9/26/22: protein elevated at 61, LDH 27, neutrophils 0, lymphocytes 33, monocytes 67, RBC 5. Oligoclonal bands negative. The flow cytometry failed due to insufficient cells. Would consider positive and recommend continued IT MTX therapy going forward x 4 total cycles. He had an interim PET-CT performed on 2/1/23 (3 months after discontinuation of O-miniCHOP in the middle of his therapy - received 3/6 cycles) which showed possible persistent disease including a small, residual focus of increased FDG activity in the left level II region, likely corresponding to a difficult to delineate lymph node, is decreased in size and metabolism (SUV 3.5; image 33; previous SUV 16.1 and skin thickening and subcutaneous nodules, right lower anterior and lateral abdominal wall, slightly more extensive and similar in metabolism (SUV 7.1; image 170; previous SUV 5.5). Discontinued chemoimmunotherapy with Obi-miniCHOP in Nov 2022.\par \par We discussed the relative response to treatment at this point and the need for additional therapy. Will begin Tafasitamab (anti-CD19; difficult for family to come in weekly) + lenalidomide. Today is Cycle 1 Day 8 of Tafasitamab + lenalidomide.\par \par Plan:\par - Repeat Lymphoma labs once per cycle\par - Continue Revlimid 20mg daily 21/28 days + Tafasitamab (12 mg/kg weekly for cycle 1-3 then biweekly thereafter). Can consider reducing Revlimid to 15mg for cycle 2 given neutropenia while on 20mg. \par - VTE ppx while on Revlimid; on Eliquis for cardiac issues.\par - Antiviral ppx: Acyclovir 400mg BID; patient non-complaint.\par - Neutropenia ppx: For ANC < 1.0 start Levaquin 500mg daily. \par - Alternative therapy would be Obinutuzumab 1000 mg every 28 days + lenalidomide 20 mg daily 21/28 days\par - Behavioral issues: Suspect dementia as he has had fairly consistent times of confusion and aggressive behavior over the past 7 months at least. Recommend psychiatric and neurology follow-up, however we explained that we are limited to treatment of his cancer and he will need to see other specialists to address behavior issues and the question as to possible worsening dementia.\par - COVID vaccine (Pfizer: 3/2/21 & 3/23/21) and booster 9/21/21 and Oct 2022.\par - Follow up in 2 weeks\par \par Case and management discussed with Dr. Cordova\par ___\par I personally have spent a total of 30 minutes of time on the date of this encounter reviewing test results, documenting findings, providing education, coordinating care and directly consulting with the patient and/or designated family member.

## 2023-04-11 ENCOUNTER — APPOINTMENT (OUTPATIENT)
Dept: GERIATRICS | Facility: CLINIC | Age: 73
End: 2023-04-11
Payer: MEDICARE

## 2023-04-11 ENCOUNTER — OUTPATIENT (OUTPATIENT)
Dept: OUTPATIENT SERVICES | Facility: HOSPITAL | Age: 73
LOS: 1 days | Discharge: HOME | End: 2023-04-11
Payer: MEDICARE

## 2023-04-11 VITALS
BODY MASS INDEX: 20.86 KG/M2 | WEIGHT: 149 LBS | HEART RATE: 60 BPM | OXYGEN SATURATION: 99 % | SYSTOLIC BLOOD PRESSURE: 140 MMHG | DIASTOLIC BLOOD PRESSURE: 52 MMHG | TEMPERATURE: 97.9 F | RESPIRATION RATE: 15 BRPM | HEIGHT: 71 IN

## 2023-04-11 DIAGNOSIS — Z95.0 PRESENCE OF CARDIAC PACEMAKER: Chronic | ICD-10-CM

## 2023-04-11 DIAGNOSIS — E04.2 NONTOXIC MULTINODULAR GOITER: ICD-10-CM

## 2023-04-11 DIAGNOSIS — C85.90 NON-HODGKIN LYMPHOMA, UNSPECIFIED, UNSPECIFIED SITE: Chronic | ICD-10-CM

## 2023-04-11 PROCEDURE — 99204 OFFICE O/P NEW MOD 45 MIN: CPT | Mod: GC

## 2023-04-11 PROCEDURE — 90792 PSYCH DIAG EVAL W/MED SRVCS: CPT

## 2023-04-11 RX ORDER — ACETAMINOPHEN 160 MG/5ML
500 SUSPENSION ORAL
Refills: 0 | Status: DISCONTINUED | COMMUNITY
End: 2023-04-11

## 2023-04-11 RX ORDER — TAMSULOSIN HYDROCHLORIDE 0.4 MG/1
0.4 CAPSULE ORAL
Refills: 0 | Status: DISCONTINUED | COMMUNITY
End: 2023-04-11

## 2023-04-11 NOTE — REASON FOR VISIT
[Initial Evaluation] : an initial evaluation [Family Member] : family member [FreeTextEntry3] : louise reece

## 2023-04-11 NOTE — END OF VISIT
[] : Fellow [FreeTextEntry3] : 71yo M comes to establish care, accompanied by his daughter/HCP, Vonnie. Multiple chronic and acute on chronic medical issues including follicular lymphoma (first dx 2004, recently new round Rx started in 9/2022, just started maintenance), CAD, HFrEF, a-flutter on anticoagulation, HTN, HLD, PAD, more recently with impaired memory, mood lability/irritability. 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 Recently just accepted into the [facility] health care program (will have intake in 7/23). Has been in/out of hospital and [facility] over past few years. Currently lives with spouse in an apt., and his wife and daughter provide support for IADLs, managing meds/health care, finances, meals, etc. They have had challenges in the past trying to bring help into the home due to Mr. DAVIES's mood swings. Exam as noted by Dr. Rod- at this interview affect appropriate, mood calm and responds/answers questions appropriately. Cor with gr 2 SM at LUSB diminishing towards base. No lifts/heaves/thrills. Ext with modest pretibial edema (0.5+) and R slightly > L in circumference - no cords, tenderness, difference in warmth or induration. Multiple issues and conditions which are likely contributing to mental issues including 9/11 exposure, systemic CA and treatment over 8-9 years, CV risks, age. Plan for him to RTC for completion of neurological exam. Also offered meeting for daughter/spouse with SW to learn about possible resources that might be available to help support patient and family. RTC as noted.

## 2023-04-11 NOTE — HISTORY OF PRESENT ILLNESS
[Completely Independent] : Completely independent. [Full assistance needed] : Assistance needed managing medications [] : Assistance needed managing finances. [Shower Chair] : shower chair [Driving Concerns] : driving concerns noted [Walker] : walker [0] : 2) Feeling down, depressed, or hopeless: Not at all (0) [FreeTextEntry1] : 71 yo M PMH Cardiomyopathy, CHF, atrial flutter ( on eliquis), anemia, dementia, elevated uric acid, hypertension, G6PD defeiciency, hyperlipidemia, high glucose, lymphoma, and thyroid nodule. \par \par Concerns for worsening memory and verbal aggression, has seen neurology on feb 14 without a clear explanation or understanding of diagnosis. because of aggression, no longer has hha or nurses at the apartment. \par \par Pt lives in an apartment with wife ( moved from [place]). apartment has an elevator. Wife and daughter are with patient daily, they take care of most of housekeeping. wife cooks. daughter goes grocery shopping. daughter manages medications and finances. \par \par \par retired . worked near [employer] in 2001. was accepted to [employer] [facility] health program \par former smoker , quit 20 y ago \par \par Vonnie (daughter): [phone] - takes all healthcare related calls\par \par Lymphoma: recently started on new maintainence therapy, follows closely with oncology\par \par discharged march 6 from rehab\par has 2 daughters ( from different mothers) \par wife -paola\par enjoys reading\par \par RLE pitting edema x 1 week\par does not use walker anymore \par abdominal itchiness improved. \par \par has MOLST form completed\par has HCP form completed \par  seeing cardiology in june \par \par Patient states that the most important matter to him is his daughter, vonnie \par \par \par

## 2023-04-11 NOTE — PHYSICAL EXAM
[Alert] : alert [Well Nourished] : well nourished [No Acute Distress] : in no acute distress [Well Developed] : well developed [Normal Oral Mucosa] : normal oral mucosa [No Respiratory Distress] : no respiratory distress [No Acc Muscle Use] : no accessory muscle use [Respiration, Rhythm And Depth] : normal respiratory rhythm and effort [Auscultation Breath Sounds / Voice Sounds] : lungs were clear to auscultation bilaterally [Normal S1, S2] : normal S1 and S2 [Heart Rate And Rhythm] : heart rate was normal and rhythm regular [Bowel Sounds] : normal bowel sounds [Pedal Pulses Normal] : the pedal pulses are present [Abdomen Tenderness] : non-tender [Abdomen Soft] : soft [No CVA Tenderness] : no CVA  tenderness [No Focal Deficits] : no focal deficits [Normal Affect] : the affect was normal [Normal Mood] : the mood was normal [de-identified] : +murmur [de-identified] : 2+ pitting edema RLE. - circumference 10 cm from patella 31, LLE circumference 30  [de-identified] : oriented to self

## 2023-04-12 DIAGNOSIS — F01.B18 VASCULAR DEMENTIA, MODERATE, WITH OTHER BEHAVIORAL DISTURBANCE: ICD-10-CM

## 2023-04-13 ENCOUNTER — RESULT REVIEW (OUTPATIENT)
Age: 73
End: 2023-04-13

## 2023-04-13 ENCOUNTER — NON-APPOINTMENT (OUTPATIENT)
Age: 73
End: 2023-04-13

## 2023-04-13 ENCOUNTER — APPOINTMENT (OUTPATIENT)
Dept: HEMATOLOGY ONCOLOGY | Facility: CLINIC | Age: 73
End: 2023-04-13
Payer: MEDICARE

## 2023-04-13 ENCOUNTER — APPOINTMENT (OUTPATIENT)
Dept: INFUSION THERAPY | Facility: HOSPITAL | Age: 73
End: 2023-04-13

## 2023-04-13 LAB
ALBUMIN SERPL ELPH-MCNC: 2.8 G/DL — LOW (ref 3.3–5)
ALP SERPL-CCNC: 82 U/L — SIGNIFICANT CHANGE UP (ref 40–120)
ALT FLD-CCNC: 13 U/L — SIGNIFICANT CHANGE UP (ref 10–45)
ANION GAP SERPL CALC-SCNC: 11 MMOL/L — SIGNIFICANT CHANGE UP (ref 5–17)
AST SERPL-CCNC: 30 U/L — SIGNIFICANT CHANGE UP (ref 10–40)
BASOPHILS # BLD AUTO: 0.04 K/UL — SIGNIFICANT CHANGE UP (ref 0–0.2)
BASOPHILS NFR BLD AUTO: 3 % — HIGH (ref 0–2)
BILIRUB SERPL-MCNC: 0.5 MG/DL — SIGNIFICANT CHANGE UP (ref 0.2–1.2)
BUN SERPL-MCNC: 23 MG/DL — SIGNIFICANT CHANGE UP (ref 7–23)
CALCIUM SERPL-MCNC: 6.5 MG/DL — CRITICAL LOW (ref 8.4–10.5)
CHLORIDE SERPL-SCNC: 115 MMOL/L — HIGH (ref 96–108)
CO2 SERPL-SCNC: 18 MMOL/L — LOW (ref 22–31)
CREAT SERPL-MCNC: 0.86 MG/DL — SIGNIFICANT CHANGE UP (ref 0.5–1.3)
EGFR: 93 ML/MIN/1.73M2 — SIGNIFICANT CHANGE UP
EOSINOPHIL # BLD AUTO: 0.12 K/UL — SIGNIFICANT CHANGE UP (ref 0–0.5)
EOSINOPHIL NFR BLD AUTO: 10 % — HIGH (ref 0–6)
GLUCOSE SERPL-MCNC: 61 MG/DL — LOW (ref 70–99)
HCT VFR BLD CALC: 29.6 % — LOW (ref 39–50)
HGB BLD-MCNC: 10.2 G/DL — LOW (ref 13–17)
LDH SERPL L TO P-CCNC: 406 U/L — HIGH (ref 50–242)
LYMPHOCYTES # BLD AUTO: 0.79 K/UL — LOW (ref 1–3.3)
LYMPHOCYTES # BLD AUTO: 66 % — HIGH (ref 13–44)
MAGNESIUM SERPL-MCNC: 1.2 MG/DL — LOW (ref 1.6–2.6)
MCHC RBC-ENTMCNC: 31.1 PG — SIGNIFICANT CHANGE UP (ref 27–34)
MCHC RBC-ENTMCNC: 34.5 G/DL — SIGNIFICANT CHANGE UP (ref 32–36)
MCV RBC AUTO: 90.2 FL — SIGNIFICANT CHANGE UP (ref 80–100)
MONOCYTES # BLD AUTO: 0.19 K/UL — SIGNIFICANT CHANGE UP (ref 0–0.9)
MONOCYTES NFR BLD AUTO: 16 % — HIGH (ref 2–14)
NEUTROPHILS # BLD AUTO: 0.06 K/UL — LOW (ref 1.8–7.4)
NEUTROPHILS NFR BLD AUTO: 5 % — LOW (ref 43–77)
NRBC # BLD: 0 /100 — SIGNIFICANT CHANGE UP (ref 0–0)
NRBC # BLD: SIGNIFICANT CHANGE UP /100 WBCS (ref 0–0)
PHOSPHATE SERPL-MCNC: 2.5 MG/DL — SIGNIFICANT CHANGE UP (ref 2.5–4.5)
PLAT MORPH BLD: NORMAL — SIGNIFICANT CHANGE UP
PLATELET # BLD AUTO: 95 K/UL — LOW (ref 150–400)
POTASSIUM SERPL-MCNC: 3.3 MMOL/L — LOW (ref 3.5–5.3)
POTASSIUM SERPL-SCNC: 3.3 MMOL/L — LOW (ref 3.5–5.3)
PROT SERPL-MCNC: 4.8 G/DL — LOW (ref 6–8.3)
RBC # BLD: 3.28 M/UL — LOW (ref 4.2–5.8)
RBC # FLD: 13.8 % — SIGNIFICANT CHANGE UP (ref 10.3–14.5)
RBC BLD AUTO: SIGNIFICANT CHANGE UP
SODIUM SERPL-SCNC: 145 MMOL/L — SIGNIFICANT CHANGE UP (ref 135–145)
URATE SERPL-MCNC: 5.1 MG/DL — SIGNIFICANT CHANGE UP (ref 3.4–8.8)
WBC # BLD: 1.19 K/UL — LOW (ref 3.8–10.5)
WBC # FLD AUTO: 1.19 K/UL — LOW (ref 3.8–10.5)

## 2023-04-13 PROCEDURE — 99214 OFFICE O/P EST MOD 30 MIN: CPT

## 2023-04-13 NOTE — PHYSICAL EXAM
[de-identified] : Ambulating today with a walker [de-identified] : +PPM [de-identified] : right sided abdominal mass measuring 2 x 4 cm above the right lateral hip. No other concerning abdominal findings. [de-identified] : + right abdominal subcutaneous nodules 3 clearly palpable, feel fixed to overlying skin with an associated rash (max size ~1.5 cm); No palpable cervical adenopathy appreciated

## 2023-04-13 NOTE — HISTORY OF PRESENT ILLNESS
[de-identified] : Initial Presentation:\par \par 71 yo with MHx significant for Atrial flutter (on Apixaban), HTN, here for further evaluation of low-level neutrophilia (since 1/2022) and lymphadenopathy. Previously NHL (around [id number]?). He received chemotherapy in 2004. 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 In 2010 he went to [place] and was treated for reoccurrence and was treated with bendamustine. He reports that he has had areas of swelling in his neck on and off for about 2 years which was mostly undergoing workup with his endocrinologist. In the last few months he has noticed increasing size of his left cervical LNs and a right nodule that caused swelling of his face, which has now spontaneously decreased in size significantly.\par \par Today he reports he feels well outside of weight loss. He denies any other constitutional complaints. He weighed 192 lbs in 10/2021 which was down to 183 lbs in December 12/2021(2 months later) which has further decreased down to 179 lbs today. He has not felt himself masses outside of his neck region. On 5/14/22, he went for US duplex of his left lower extremity due to a "tangerine size lump" on the back of his left thigh, which noted a 4.4 cm hypoechoic mass in his left inguinal region without commenting on his perceived posterior thigh mass. Also, on 5/2/22 he underwent US of his thyroid and parathyroid glands where they noticed bilateral cervical lymph nodes with the largest on the left side measuring 2.1 x 1.6 x 1.9 cm and a right level 1 LN measuring 2.2 x 1.2 x 2.3 cm. They saw 3 lymph nodes on the left side and one discrete LN on the right.\par \par  He also recently just finished a 14 day course of Levofloxacin for an uncomplicated phenomena. He was having a dry cough and underwent imaging as an outpatient which found mild left and right pleural effusions, areas of consolidation on the right and retrocardiac airspace involvement (performed 5/2/22). He now feels better without infectious complaints.\par \par In addition, he is currently anemic. He last had a colonoscopy in his recent memory. He had bleeding hemorrhoids last year treated with OTC medications. He denies any tick bites recently. \par \par He also has MGUS with 3 separate monoclonal gammopathies I suspect is related to his NHL. He has M Judd 1 showing IgG Lambda at 0.3 g/dl, M Judd 2 showing IgG Kappa at 0.2 g/dl, M Judd 3 showing IgM Lambda (unable to quantitate). There is no suspicion for myeloma or waldenstroms at this time and his M-spikes will be monitored. He has no elevation in kappa/ lambda FLC ration, but individual light chains are both elevated, kappa at 10.77 mg/dL and lambda at 6.58 mg/dL.\par \par Social Hx\par - He is currently retired. He used to be an .\par - No significant environmental exposure to chemicals\par - Lives by himself and his wife lives in [place].\par - Former smoker. He smoked for 10 years less than 1 pack a day. He quit 20 years ago\par \par Family hx\par - Two brothers non Hodgkins lymphoma. At least one required chemotherapy. sister had cervical cancer first diagnosed 2007 and then 3 years later with more cancer discovered\par - Mother and father passed away from heart disease and diabetes.\par \par =======================================================\par Care Providers:\[facility] \par PMD: Dr Amari Hickman\[facility] Endo: Dr Carter Vigil\[facility] Cardiologist: Dr. Don [phone] fax [phone]\[facility] \par =======================================================\par Contacts:\[facility] \par Vonnie (daughter): [phone] - takes all healthcare related calls\[facility] \[facility] ======================================================= [de-identified] : PENDING [de-identified] : Fine Needle Aspiration Report - Auth (Verified)\par Specimen(s) Submitted\par 1. AXILLA, LEFT, US GUIDED CORE BIOPSY AND FNA\par 2. LYMPH NODE, CERVICAL, LEFT POSTERIOR, US GUIDED CORE BIOPSY AND FNA\par \par \par Final Diagnosis\par 1. AXILLA, LEFT, US GUIDED CORE BIOPSY AND FNA\par POSITIVE FOR MALIGNANT CELLS.\par B-cell lymphoma of follicular center origin, most consistent with\par follicular lymphoma, grade 3A.  See note.\par \par Fine Needle Aspiration Addendum Report - Auth (Verified)\par \par Addendum\par 2. LYMPH NODE, CERVICAL, LEFT POSTERIOR, US GUIDED CORE BIOPSY AND FNA\par POSITIVE FOR MALIGNANT CELLS.\par B-cell lymphoma of follicular center origin with high proliferation index.\par See note.\par \par Note:\par The neoplastic B cells demonstrate a follicular center B-cell phenotype with MUM-1 co-expression and a high proliferation index.  Follicular dendritic meshwork is present in most areas of the biopsy, consistent\par with follicular lymphoma.  However, there is one focal area with increased larger cells and lack of follicular dendritic meshwork. Therefore, a high grade component can not be excluded.  If clinically indicated, suggest excisional biopsy for definitive classification.\par \par Immunohistochemical stains   (CD3, CD5, CD10, CD20, CD21, CD23, CD30, BCL-6, BCL-2, cyclinD1, ki-67, c-MYC, PAX-5, MUM-1, p53, ISAURO) were performed on block 2C.  The neoplastic cells are positive for CD20, PAX-5, BCL-6, BCL-2, MUM-1 (partial), dim p53; negative CD5, CD10, CD30, cyclinD1, ISAURO.  CD21 and CD23 highlight follicular dendritic meshwork in most areas with focal absence of follicular dendritic meshwork. \par Ki-67 proliferation index is approximately 60 to 70%.  C-MYC stains approximately 20 to 30% of cells.  CD3 and CD5 highlight some T-cells in the background. [de-identified] : 6/14/22 FNA of Left axilla LN: FLUORESCENCE IN-SITU HYBRIDIZATION (FISH)\par 1. No evidence of MYC Rearrangement\par 2. No evidence of BCL2-IGH [translocation t(14;18)] gene rearrangement\par 3. 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 [FreeTextEntry1] : Tafa plus Revlimid 20 mg daily 21/28 days (PENDING start date due to recent hospitalization and insurance barriers) [de-identified] : 5/18/22: Initial Visit.\par \par 6/20/22: Follow-up. Patient feels well overall. Lymphoma labs and left axilla LN biopsy revealed grade 3A follicular lymphoma, IgG Lambda and Kappa MGUS. He reports lymph nodes have been stable. Heart function is stable. He denies having any recent fevers, chills, nausea. No weight changes.\par \par 8/22/22: Follow-up. Patient feels well overall. Awaiting for biopsy results of lymph nodes in his back. He does not have pain in the left back. The left side of his neck gives him discomfort. He has never received chemotherapy nor antibody treatment in the past. No fevers, chills, night sweats. No new noticeable masses  Good appetite, lost 7 lbs (lost a lot of fluid weight due to diuretics). \par \par 10/17/22: Follow up. In the interim, he was hospitalized at [facility] twice (8/27-9/12 & 9/16-10/3). 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 Patient was treated with rasburicase, but developed rasburicase-triggered G6PD hemolysis. Also found to be in acute exacerbation of HFrEF and have a prolonged QTc (likely medication-induced). During hospital stay was found to have altered mental status. 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 In mid-September patient was hospitalized for Encephalopathy (+Rhinovirus/enterovirus) unrelated to the Lymphoma. Today, he feels at baseline. Notes resolution of cervical LNs, and back lesions since starting treatment. Patient denies fever, chills, night sweats, back pain, abdominal pain, chest pain, or shortness of breath. Fair appetite, weight loss due to prolonged hospitalizations.\par \par 11/17/22: Follow-up. On 10/28/22 at home was found down by his daughter found to have fever, STEPHEN and AMS and was admitted for acute metabolic encephalopathy with sepsis 2/2 pneumonia s/p 7 days zosyn with improvement. Today he presents from rehab with his daughter. He is not feeling well. He feels that he is weak and fatigued. He has been doing PT / walking around the facility. He reads when awake. He has a poor appetite, but his family is bringing in food that he likes. No fevers, chills, night sweats. No new lumps or masses.\par \par 2/6/23: Follow-up. He was readmitted [facility] 1/9/23 to 1/20/23 for malaise and cytopenias. In December 2022 he had pneumonia and COVID19 for which there has been a long recovery. Due to these complications, his treatment with chemoimmunotherapy (O-miniCHOP) was discontinued. He is currently staying in a rehab to improve his performance status. He overall feels well today and reports feeling much improved relative to Dec 2022. He eats 3 meals a day, but prefers to eat late at night. His appetite is good and his daughter brings a lot of food supplementation for him. He continues to lose weight however. No other recent illnesses. He had a PET-CT last week.\par \par 3/13/23: Followup. He has not yet rec'd revlimid but is now approved for free drug. Continues to have issues gaining weight, and reports a very good appetite. He has not lost weight at least. He also has been having right foot pain between the ankle and toes since the night of 3/6/23. He also has a rash on toes 1-3. He continues to have right lateral abdominal itchiness around a site of swelling / lump. This has not changed in the past month. He is now back home. He is able to ambulate with a walker. He is not limited by dyspnea. He had edema in the rehab which has resolved. Per family he has also been having intermittent periods where he is not himself and he becomes aggressive toward family members. This has been an ongoing issue for sometime \par \par 3/30/23: Follow-up. Today is Cycle 1 Day 1 of Tafa. He has not yet rec'd revlimid (expected delivery today); is approved for free drug via [employer]. Reports intentional weight gain over the past couple of weeks and ambulating with a walker. Continues to have right foot pain between the ankle and toes since the night of 3/6/23, and have right lateral abdominal itchiness around a site of swelling / lump. This has not changed in the past month. He is now back home. Per family he has intermittent periods where he is not himself and he becomes aggressive toward family members. \par \par 4/6/23: Follow-up. Today is Cycle 1 Day 8 of Tafasitamab. Did receive revlimid on day 1 and has been tolerating Revlimid well. Denies any abdominal issues, continues to have a good appetite. Ambulating with a walker but continues to have right foot pain, and have right lateral abdominal itchiness around a site of swelling / lump. Per family he has intermittent periods where he is not himself and he becomes aggressive toward family members. \par \par 4/13/23: Follow-up. Today is Cycle 1 Day 15 of Tafasitamab. He is tolerating the regimen well. Denies any abdominal issues, continues to have a good appetite. Ambulating with a walker but continues to have right foot pain, and have right lateral abdominal itchiness around a site of swelling / lump. Per family he has intermittent periods where he is not himself and he becomes aggressive toward family members. \par \par A comprehensive review of systems was performed including constitutional, eyes, ENT, cardiovascular, respiratory, gastrointestinal, genitourinary, musculoskeletal, integumentary, neurological, psychiatric and hematologic / lymphatic. All pertinent positives are included in the H&P under interval history above and the remaining review of systems listed are negative. \par \par ====================================================\par Treatment Hx:\par \par Obinutuzumab-mini-COEP q21 days x 3 cycles (incomplete due to toxicities and patient preference to switch to more tolerable regimen)\par (Obinutuzumab modified mini-COEP: 400 mg/m² cyclophosphamide, Etoposide (40% dose reduced to 30 mg/m2 IV on day 1 and 60 mg/m2 PO on days 2 and 3 of each cycle), and 2 mg vincristine (flat dose) on day 1 of each cycle, and 100 mg prednisone on days 1–5. Modified from Man et al 2009 Blood "R-CHOP with Etoposide Substituted for Doxorubicin (R-CEOP): Excellent Outcome in Diffuse Large B Cell Lymphoma for Patients with a Contraindication to Anthracyclines." with mini- dosing for elderly patients)\par - Cycle 1 Day 1 given 9/2/22 - third dose of Obinutuzumab held due to AMS, requiring admission to [facility] found to have enterovirus infection\par - Cycle 2 Day 1 given 9/30/22 - Given as an inpatient at [facility]\par - Cycle 3 Day 1 given 10/21/22 - Complicated by pneumonia, requiring admission and rehab placement\par \par Tafasitamab plus Revlimid (changed therapy due to patient and family's wishes due to intolerable toxicities)\par - Cycle 1 Day 1 on 3/30/23\par \par \par ====================================================

## 2023-04-13 NOTE — ASSESSMENT
[FreeTextEntry1] : 73 yo male with PMHx significant for follicular lymphoma with DLBCL (tx with R-CHOP in 2003, with relapse in 2009, treated with BR) with his initial presentation at [facility] with multiple areas of palpable lymphadenopathy with follicular lymphoma grade IIIA (IPI score 3) in the setting of a steady decline in weight > 20 lbs in the last 6 months without other constitutional complaints. 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 \par \par He underwent PET-CT in Aug 2022 which showed FDG avid lymph nodes in the left cervical, bilateral supraclavicular regions, and chest, and a few FDG avid abdominal lymph nodes, intramuscular masses in the left posterior chest and left upper thigh, scattered FDG avid subcutaneous soft tissue/nodules with trace right pleural effusion, moderate left pleural effusion, loculated fluid in the right middle lobe. Moderate abdominal and pelvic ascites. Subcutaneous edema in the lower abdominal wall extending into the imaged bilateral thighs. Splenomegaly with mild FDG avidity, suspicious for lymphomatous involvement.\par \par Prior to the PET-CT, he underwent biopsy of both a cervical lymph node and a left axilla lymph node. 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 There were areas of > 20 SUV on the PET-CT, repeat whole lymph node biopsy was requested to confirm suspected diagnosis of Grade 3B FL or transformed large cell lymphoma. S/p excisional biopsy of back lesion on 9/7/22 which showed recurrent DLBCL.\par \par LP 9/26/22: protein elevated at 61, LDH 27, neutrophils 0, lymphocytes 33, monocytes 67, RBC 5. Oligoclonal bands negative. The flow cytometry failed due to insufficient cells. Would consider positive and recommend continued IT MTX therapy going forward x 4 total cycles. He had an interim PET-CT performed on 2/1/23 (3 months after discontinuation of O-miniCHOP in the middle of his therapy - received 3/6 cycles) which showed possible persistent disease including a small, residual focus of increased FDG activity in the left level II region, likely corresponding to a difficult to delineate lymph node, is decreased in size and metabolism (SUV 3.5; image 33; previous SUV 16.1 and skin thickening and subcutaneous nodules, right lower anterior and lateral abdominal wall, slightly more extensive and similar in metabolism (SUV 7.1; image 170; previous SUV 5.5). Discontinued chemoimmunotherapy with Obi-miniCHOP in Nov 2022.\par \par We discussed the relative response to treatment at this point and the need for additional therapy. Will begin Tafasitamab (anti-CD19; difficult for family to come in weekly) + lenalidomide. Today is Cycle 1 Day 15 of Tafasitamab + lenalidomide.\par \par Plan:\par - Repeat Lymphoma labs once per cycle\par - Continue Revlimid 20mg daily 21/28 days + Tafasitamab (12 mg/kg weekly for cycle 1-3 then biweekly thereafter). Can consider reducing Revlimid to 15mg for cycle 2 given neutropenia while on 20mg. \par - VTE ppx while on Revlimid; on Eliquis for cardiac issues.\par - Antiviral ppx: Acyclovir 400mg BID; patient non-complaint.\par - Neutropenia ppx: For ANC < 1.0 start Levaquin 500mg daily. \par - Alternative therapy would be Obinutuzumab 1000 mg every 28 days + lenalidomide 20 mg daily 21/28 days\par - Behavioral issues: Suspect dementia as he has had fairly consistent times of confusion and aggressive behavior over the past 7 months at least. Recommend psychiatric and neurology follow-up, however we explained that we are limited to treatment of his cancer and he will need to see other specialists to address behavior issues and the question as to possible worsening dementia.\par - COVID vaccine (Pfizer: 3/2/21 & 3/23/21) and booster 9/21/21 and Oct 2022.\par - Follow up in 2 weeks\par \par Case and management discussed with Dr. Cordova\par ___\par I personally have spent a total of 30 minutes of time on the date of this encounter reviewing test results, documenting findings, providing education, coordinating care and directly consulting with the patient and/or designated family member.

## 2023-04-20 ENCOUNTER — RESULT REVIEW (OUTPATIENT)
Age: 73
End: 2023-04-20

## 2023-04-20 ENCOUNTER — APPOINTMENT (OUTPATIENT)
Dept: ULTRASOUND IMAGING | Facility: IMAGING CENTER | Age: 73
End: 2023-04-20
Payer: MEDICARE

## 2023-04-20 ENCOUNTER — OUTPATIENT (OUTPATIENT)
Dept: OUTPATIENT SERVICES | Facility: HOSPITAL | Age: 73
LOS: 1 days | End: 2023-04-20
Payer: COMMERCIAL

## 2023-04-20 ENCOUNTER — APPOINTMENT (OUTPATIENT)
Dept: HEMATOLOGY ONCOLOGY | Facility: CLINIC | Age: 73
End: 2023-04-20
Payer: MEDICARE

## 2023-04-20 ENCOUNTER — APPOINTMENT (OUTPATIENT)
Dept: INFUSION THERAPY | Facility: HOSPITAL | Age: 73
End: 2023-04-20

## 2023-04-20 ENCOUNTER — APPOINTMENT (OUTPATIENT)
Dept: HEMATOLOGY ONCOLOGY | Facility: CLINIC | Age: 73
End: 2023-04-20

## 2023-04-20 DIAGNOSIS — Z95.0 PRESENCE OF CARDIAC PACEMAKER: Chronic | ICD-10-CM

## 2023-04-20 DIAGNOSIS — C85.90 NON-HODGKIN LYMPHOMA, UNSPECIFIED, UNSPECIFIED SITE: Chronic | ICD-10-CM

## 2023-04-20 DIAGNOSIS — Z00.8 ENCOUNTER FOR OTHER GENERAL EXAMINATION: ICD-10-CM

## 2023-04-20 LAB
BASOPHILS # BLD AUTO: 0.06 K/UL — SIGNIFICANT CHANGE UP (ref 0–0.2)
BASOPHILS NFR BLD AUTO: 3 % — HIGH (ref 0–2)
EOSINOPHIL # BLD AUTO: 0.41 K/UL — SIGNIFICANT CHANGE UP (ref 0–0.5)
EOSINOPHIL NFR BLD AUTO: 22 % — HIGH (ref 0–6)
HCT VFR BLD CALC: 32.6 % — LOW (ref 39–50)
HGB BLD-MCNC: 11.1 G/DL — LOW (ref 13–17)
LYMPHOCYTES # BLD AUTO: 0.83 K/UL — LOW (ref 1–3.3)
LYMPHOCYTES # BLD AUTO: 44 % — SIGNIFICANT CHANGE UP (ref 13–44)
MCHC RBC-ENTMCNC: 30.6 PG — SIGNIFICANT CHANGE UP (ref 27–34)
MCHC RBC-ENTMCNC: 34 G/DL — SIGNIFICANT CHANGE UP (ref 32–36)
MCV RBC AUTO: 89.8 FL — SIGNIFICANT CHANGE UP (ref 80–100)
MONOCYTES # BLD AUTO: 0.58 K/UL — SIGNIFICANT CHANGE UP (ref 0–0.9)
MONOCYTES NFR BLD AUTO: 31 % — HIGH (ref 2–14)
NEUTROPHILS # BLD AUTO: 0 K/UL — LOW (ref 1.8–7.4)
NEUTROPHILS NFR BLD AUTO: 0 % — LOW (ref 43–77)
NRBC # BLD: 0 /100 — SIGNIFICANT CHANGE UP (ref 0–0)
NRBC # BLD: SIGNIFICANT CHANGE UP /100 WBCS (ref 0–0)
PLAT MORPH BLD: NORMAL — SIGNIFICANT CHANGE UP
PLATELET # BLD AUTO: 141 K/UL — LOW (ref 150–400)
RBC # BLD: 3.63 M/UL — LOW (ref 4.2–5.8)
RBC # FLD: 13.9 % — SIGNIFICANT CHANGE UP (ref 10.3–14.5)
RBC BLD AUTO: SIGNIFICANT CHANGE UP
WBC # BLD: 1.88 K/UL — LOW (ref 3.8–10.5)
WBC # FLD AUTO: 1.88 K/UL — LOW (ref 3.8–10.5)

## 2023-04-20 PROCEDURE — 93971 EXTREMITY STUDY: CPT | Mod: 26,RT

## 2023-04-20 PROCEDURE — 93971 EXTREMITY STUDY: CPT

## 2023-04-20 PROCEDURE — 99215 OFFICE O/P EST HI 40 MIN: CPT

## 2023-04-20 NOTE — PHYSICAL EXAM
[Ambulatory and capable of all self care but unable to carry out any work activities] : Status 2- Ambulatory and capable of all self care but unable to carry out any work activities. Up and about more than 50% of waking hours [Thin] : thin [Normal] : affect appropriate [de-identified] : Ambulating [de-identified] : +PPM [de-identified] : Right leg edema, right upper calf fullness, non-tender [de-identified] : right sided abdominal mass measuring <1 cm (reduced)

## 2023-04-20 NOTE — ASSESSMENT
[FreeTextEntry1] : 73 yo male with PMHx significant for follicular lymphoma with DLBCL (tx with R-CHOP in 2003, with relapse in 2009, treated with BR) with his initial presentation at [facility] with multiple areas of palpable lymphadenopathy with follicular lymphoma grade IIIA (IPI score 3) in the setting of a steady decline in weight > 20 lbs in the last 6 months without other constitutional complaints. 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 \par \par He underwent PET-CT in Aug 2022 which showed FDG avid lymph nodes in the left cervical, bilateral supraclavicular regions, and chest, and a few FDG avid abdominal lymph nodes, intramuscular masses in the left posterior chest and left upper thigh, scattered FDG avid subcutaneous soft tissue/nodules with trace right pleural effusion, moderate left pleural effusion, loculated fluid in the right middle lobe. Moderate abdominal and pelvic ascites. Subcutaneous edema in the lower abdominal wall extending into the imaged bilateral thighs. Splenomegaly with mild FDG avidity, suspicious for lymphomatous involvement.\par \par Prior to the PET-CT, he underwent biopsy of both a cervical lymph node and a left axilla lymph node. 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 There were areas of > 20 SUV on the PET-CT, repeat whole lymph node biopsy was requested to confirm suspected diagnosis of Grade 3B FL or transformed large cell lymphoma. S/p excisional biopsy of back lesion on 9/7/22 which showed recurrent DLBCL.\par \par LP 9/26/22: protein elevated at 61, LDH 27, neutrophils 0, lymphocytes 33, monocytes 67, RBC 5. Oligoclonal bands negative. The flow cytometry failed due to insufficient cells. Would consider positive and recommend continued IT MTX therapy going forward x 4 total cycles. He had an interim PET-CT performed on 2/1/23 (3 months after discontinuation of O-miniCHOP in the middle of his therapy - received 3/6 cycles) which showed possible persistent disease including a small, residual focus of increased FDG activity in the left level II region, likely corresponding to a difficult to delineate lymph node, is decreased in size and metabolism (SUV 3.5; image 33; previous SUV 16.1 and skin thickening and subcutaneous nodules, right lower anterior and lateral abdominal wall, slightly more extensive and similar in metabolism (SUV 7.1; image 170; previous SUV 5.5). Discontinued chemoimmunotherapy with Obi-miniCHOP in Nov 2022.\par \par He is now on Tafasitamab (anti-CD19) + lenalidomide since 3/30/23. HELD lenalidomide since 4/20/23 due to severe neutropenia. Today is Cycle 1 Day 12 of Tafasitamab + lenalidomide.\par \par Plan:\par - Repeat Lymphoma labs once per cycle\par - Continue Tafasitamab (12 mg/kg weekly for cycle 1-3 then biweekly thereafter). \par - HOLD Revlimid starting 4/20/23, await ANC recovery to >1000\par - Can consider reducing Revlimid to 15 mg for cycle 2 given neutropenia while on 20 mg. \par - VTE ppx while on Revlimid; on Apixaban for cardiac issues, Continue 5 mg q12hrs. Right leg swelling, check right leg dopplers to rule out DVT.\par - Antiviral ppx: Acyclovir 400 mg BID\par - Neutropenia ppx: Continue Levaquin 500 mg daily. Plan for peg-filgrastim given severe neutropenia with ANC of 0. No infectious signs at present. Low threshold for ED visit.\par - Behavioral issues: Suspect dementia as he has had fairly consistent times of confusion and aggressive behavior over the past 7 months at least. No evidence of lymphoma CNS involvement.\par - COVID vaccine (Pfizer: 3/2/21 & 3/23/21) and booster 9/21/21 and Oct 2022.\par - Follow up in 1 week\par \par ___\par I personally have spent a total of 45 minutes of time on the date of this encounter reviewing test results, documenting findings, providing education, coordinating care and directly consulting with the patient and/or designated family member.

## 2023-04-20 NOTE — HISTORY OF PRESENT ILLNESS
[Disease:__________________________] : Disease: [unfilled] [Treatment Protocol] : Treatment Protocol [de-identified] : Initial Presentation:\par \par 71 yo with MHx significant for Atrial flutter (on Apixaban), HTN, here for further evaluation of low-level neutrophilia (since 1/2022) and lymphadenopathy. Previously NHL (around 1220-4319?). He received chemotherapy in 2004. 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 In 2010 he went to [facility] and was treated for reoccurrence and was treated with bendamustine. He reports that he has had areas of swelling in his neck on and off for about 2 years which was mostly undergoing workup with his endocrinologist. In the last few months he has noticed increasing size of his left cervical LNs and a right nodule that caused swelling of his face, which has now spontaneously decreased in size significantly.\par \par Today he reports he feels well outside of weight loss. He denies any other constitutional complaints. He weighed 192 lbs in 10/2021 which was down to 183 lbs in December 12/2021(2 months later) which has further decreased down to 179 lbs today. He has not felt himself masses outside of his neck region. On 5/14/22, he went for US duplex of his left lower extremity due to a "tangerine size lump" on the back of his left thigh, which noted a 4.4 cm hypoechoic mass in his left inguinal region without commenting on his perceived posterior thigh mass. Also, on 5/2/22 he underwent US of his thyroid and parathyroid glands where they noticed bilateral cervical lymph nodes with the largest on the left side measuring 2.1 x 1.6 x 1.9 cm and a right level 1 LN measuring 2.2 x 1.2 x 2.3 cm. They saw 3 lymph nodes on the left side and one discrete LN on the right.\par \par  He also recently just finished a 14 day course of Levofloxacin for an uncomplicated phenomena. He was having a dry cough and underwent imaging as an outpatient which found mild left and right pleural effusions, areas of consolidation on the right and retrocardiac airspace involvement (performed 5/2/22). He now feels better without infectious complaints.\par \par In addition, he is currently anemic. He last had a colonoscopy in his recent memory. He had bleeding hemorrhoids last year treated with OTC medications. He denies any tick bites recently. \par \par He also has MGUS with 3 separate monoclonal gammopathies I suspect is related to his NHL. He has M Judd 1 showing IgG Lambda at 0.3 g/dl, M Judd 2 showing IgG Kappa at 0.2 g/dl, M Judd 3 showing IgM Lambda (unable to quantitate). There is no suspicion for myeloma or waldenstroms at this time and his M-spikes will be monitored. He has no elevation in kappa/ lambda FLC ration, but individual light chains are both elevated, kappa at 10.77 mg/dL and lambda at 6.58 mg/dL.\par \par Social Hx\par - He is currently retired. He used to be an .\par - No significant environmental exposure to chemicals\par - Lives by himself and his wife lives in [place].\par - Former smoker. He smoked for 10 years less than 1 pack a day. He quit 20 years ago\par \par Family hx\par - Two brothers non Hodgkins lymphoma. At least one required chemotherapy. sister had cervical cancer first diagnosed 2007 and then 3 years later with more cancer discovered\par - Mother and father passed away from heart disease and diabetes.\par \par =======================================================\par Care Providers:\[facility] \par PMD: Dr Amari Hickman\[facility] Endo: Dr Carter Vigil\[facility] Cardiologist: Dr. Don [phone] fax [phone]\[facility] \par =======================================================\par Contacts:\[facility] \par Vonnie (daughter): [phone] - takes all healthcare related calls\[facility] \[facility] ======================================================= [de-identified] : PENDING [de-identified] : Fine Needle Aspiration Report - Auth (Verified)\par Specimen(s) Submitted\par 1. AXILLA, LEFT, US GUIDED CORE BIOPSY AND FNA\par 2. LYMPH NODE, CERVICAL, LEFT POSTERIOR, US GUIDED CORE BIOPSY AND FNA\par \par \par Final Diagnosis\par 1. AXILLA, LEFT, US GUIDED CORE BIOPSY AND FNA\par POSITIVE FOR MALIGNANT CELLS.\par B-cell lymphoma of follicular center origin, most consistent with\par follicular lymphoma, grade 3A.  See note.\par \par Fine Needle Aspiration Addendum Report - Auth (Verified)\par \par Addendum\par 2. LYMPH NODE, CERVICAL, LEFT POSTERIOR, US GUIDED CORE BIOPSY AND FNA\par POSITIVE FOR MALIGNANT CELLS.\par B-cell lymphoma of follicular center origin with high proliferation index.\par See note.\par \par Note:\par The neoplastic B cells demonstrate a follicular center B-cell phenotype with MUM-1 co-expression and a high proliferation index.  Follicular dendritic meshwork is present in most areas of the biopsy, consistent\par with follicular lymphoma.  However, there is one focal area with increased larger cells and lack of follicular dendritic meshwork. Therefore, a high grade component can not be excluded.  If clinically indicated, suggest excisional biopsy for definitive classification.\par \par Immunohistochemical stains   (CD3, CD5, CD10, CD20, CD21, CD23, CD30, BCL-6, BCL-2, cyclinD1, ki-67, c-MYC, PAX-5, MUM-1, p53, ISAURO) were performed on block 2C.  The neoplastic cells are positive for CD20, PAX-5, BCL-6, BCL-2, MUM-1 (partial), dim p53; negative CD5, CD10, CD30, cyclinD1, ISAURO.  CD21 and CD23 highlight follicular dendritic meshwork in most areas with focal absence of follicular dendritic meshwork. \par Ki-67 proliferation index is approximately 60 to 70%.  C-MYC stains approximately 20 to 30% of cells.  CD3 and CD5 highlight some T-cells in the background. [de-identified] : 6/14/22 FNA of Left axilla LN: FLUORESCENCE IN-SITU HYBRIDIZATION (FISH)\par 1. No evidence of MYC Rearrangement\par 2. No evidence of BCL2-IGH [translocation t(14;18)] gene rearrangement\par 3. 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 [de-identified] : 5/18/22: Initial Visit.\par \par 6/20/22: Follow-up. Patient feels well overall. Lymphoma labs and left axilla LN biopsy revealed grade 3A follicular lymphoma, IgG Lambda and Kappa MGUS. He reports lymph nodes have been stable. Heart function is stable. He denies having any recent fevers, chills, nausea. No weight changes.\par \par 8/22/22: Follow-up. Patient feels well overall. Awaiting for biopsy results of lymph nodes in his back. He does not have pain in the left back. The left side of his neck gives him discomfort. He has never received chemotherapy nor antibody treatment in the past. No fevers, chills, night sweats. No new noticeable masses  Good appetite, lost 7 lbs (lost a lot of fluid weight due to diuretics). \par \par 10/17/22: Follow up. In the interim, he was hospitalized at [facility] twice (8/27-9/12 & 9/16-10/3). 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 Patient was treated with rasburicase, but developed rasburicase-triggered G6PD hemolysis. Also found to be in acute exacerbation of HFrEF and have a prolonged QTc (likely medication-induced). During hospital stay was found to have altered mental status. 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 In mid-September patient was hospitalized for Encephalopathy (+Rhinovirus/enterovirus) unrelated to the Lymphoma. Today, he feels at baseline. Notes resolution of cervical LNs, and back lesions since starting treatment. Patient denies fever, chills, night sweats, back pain, abdominal pain, chest pain, or shortness of breath. Fair appetite, weight loss due to prolonged hospitalizations.\par \par 11/17/22: Follow-up. On 10/28/22 at home was found down by his daughter found to have fever, STEPHEN and AMS and was admitted for acute metabolic encephalopathy with sepsis 2/2 pneumonia s/p 7 days zosyn with improvement. Today he presents from rehab with his daughter. He is not feeling well. He feels that he is weak and fatigued. He has been doing PT / walking around the facility. He reads when awake. He has a poor appetite, but his family is bringing in food that he likes. No fevers, chills, night sweats. No new lumps or masses.\par \par 2/6/23: Follow-up. He was readmitted [facility] 1/9/23 to 1/20/23 for malaise and cytopenias. In December 2022 he had pneumonia and COVID19 for which there has been a long recovery. Due to these complications, his treatment with chemoimmunotherapy (O-miniCHOP) was discontinued. He is currently staying in a rehab to improve his performance status. He overall feels well today and reports feeling much improved relative to Dec 2022. He eats 3 meals a day, but prefers to eat late at night. His appetite is good and his daughter brings a lot of food supplementation for him. He continues to lose weight however. No other recent illnesses. He had a PET-CT last week.\par \par 3/13/23: Followup. He has not yet rec'd revlimid but is now approved for free drug. Continues to have issues gaining weight, and reports a very good appetite. He has not lost weight at least. He also has been having right foot pain between the ankle and toes since the night of 3/6/23. He also has a rash on toes 1-3. He continues to have right lateral abdominal itchiness around a site of swelling / lump. This has not changed in the past month. He is now back home. He is able to ambulate with a walker. He is not limited by dyspnea. He had edema in the rehab which has resolved. Per family he has also been having intermittent periods where he is not himself and he becomes aggressive toward family members. This has been an ongoing issue for sometime \par \par 3/30/23: Follow-up. Today is Cycle 1 Day 1 of Tafa. He has not yet rec'd revlimid (expected delivery today); is approved for free drug via [employer]. Reports intentional weight gain over the past couple of weeks and ambulating with a walker. Continues to have right foot pain between the ankle and toes since the night of 3/6/23, and have right lateral abdominal itchiness around a site of swelling / lump. This has not changed in the past month. He is now back home. Per family he has intermittent periods where he is not himself and he becomes aggressive toward family members. \par \par 4/6/23: Follow-up. Today is Cycle 1 Day 8 of Tafasitamab. Did receive revlimid on day 1 and has been tolerating Revlimid well. Denies any abdominal issues, continues to have a good appetite. Ambulating with a walker but continues to have right foot pain, and have right lateral abdominal itchiness around a site of swelling / lump. Per family he has intermittent periods where he is not himself and he becomes aggressive toward family members. \par \par 4/13/23: Follow-up. Today is Cycle 1 Day 15 of Tafasitamab. He is tolerating the regimen well. Denies any abdominal issues, continues to have a good appetite. Ambulating with a walker but continues to have right foot pain, and have right lateral abdominal itchiness around a site of swelling / lump. Per family he has intermittent periods where he is not himself and he becomes aggressive toward family members. \par \par 4/20/23: Follow-up. Today is Cycle 1 Day 22. Today he reports feeling well, but has noticed his right leg / foot is swollen and associated with shoe tightness. He reports that he has been taking his apixaban which he takes for cardiac reasons. His ANC is 0, but denies any mucositis, or other infectious issues. No new lumps or masses. His right abd mass is decreasing in size.\par \par A comprehensive review of systems was performed including constitutional, eyes, ENT, cardiovascular, respiratory, gastrointestinal, genitourinary, musculoskeletal, integumentary, neurological, psychiatric and hematologic / lymphatic. All pertinent positives are included in the H&P under interval history above and the remaining review of systems listed are negative. \par \par ====================================================\par Treatment Hx:\par \par Obinutuzumab-mini-COEP q21 days x 3 cycles (incomplete due to toxicities and patient preference to switch to more tolerable regimen)\par (Obinutuzumab modified mini-COEP: 400 mg/m² cyclophosphamide, Etoposide (40% dose reduced to 30 mg/m2 IV on day 1 and 60 mg/m2 PO on days 2 and 3 of each cycle), and 2 mg vincristine (flat dose) on day 1 of each cycle, and 100 mg prednisone on days 1–5. Modified from Man et al 2009 Blood "R-CHOP with Etoposide Substituted for Doxorubicin (R-CEOP): Excellent Outcome in Diffuse Large B Cell Lymphoma for Patients with a Contraindication to Anthracyclines." with mini- dosing for elderly patients)\par - Cycle 1 Day 1 given 9/2/22 - third dose of Obinutuzumab held due to AMS, requiring admission to [facility] found to have enterovirus infection\par - Cycle 2 Day 1 given 9/30/22 - Given as an inpatient at [facility]\par - Cycle 3 Day 1 given 10/21/22 - Complicated by pneumonia, requiring admission and rehab placement\par \par Tafasitamab plus Revlimid (changed therapy due to patient and family's wishes due to intolerable toxicities), On 28 day cycles.\par - Cycle 1 Day 1 on 3/30/23 (HELD revlimid from 4/20/23 due to neutropenia)\par - Cycle 2 Day 1 on 4/26/23\par \par \par ==================================================== [FreeTextEntry1] : Tafasitamab  plus Revlimid 20 mg daily 21/28 days.

## 2023-04-21 RX ORDER — FILGRASTIM-SNDZ 480 UG/.8ML
480 INJECTION, SOLUTION INTRAVENOUS; SUBCUTANEOUS DAILY
Qty: 6 | Refills: 0 | Status: COMPLETED | COMMUNITY
Start: 2023-04-21 | End: 2023-04-21

## 2023-04-22 ENCOUNTER — APPOINTMENT (OUTPATIENT)
Dept: INFUSION THERAPY | Facility: HOSPITAL | Age: 73
End: 2023-04-22

## 2023-04-22 ENCOUNTER — RESULT REVIEW (OUTPATIENT)
Age: 73
End: 2023-04-22

## 2023-04-22 LAB
ALBUMIN SERPL ELPH-MCNC: 3.9 G/DL — SIGNIFICANT CHANGE UP (ref 3.3–5)
ALP SERPL-CCNC: 123 U/L — HIGH (ref 40–120)
ALT FLD-CCNC: 17 U/L — SIGNIFICANT CHANGE UP (ref 10–45)
ANION GAP SERPL CALC-SCNC: 14 MMOL/L — SIGNIFICANT CHANGE UP (ref 5–17)
APTT BLD: 29.3 SEC — SIGNIFICANT CHANGE UP (ref 27.5–35.5)
AST SERPL-CCNC: 36 U/L — SIGNIFICANT CHANGE UP (ref 10–40)
BASOPHILS # BLD AUTO: 0.04 K/UL — SIGNIFICANT CHANGE UP (ref 0–0.2)
BASOPHILS NFR BLD AUTO: 2 % — SIGNIFICANT CHANGE UP (ref 0–2)
BILIRUB SERPL-MCNC: 0.5 MG/DL — SIGNIFICANT CHANGE UP (ref 0.2–1.2)
BUN SERPL-MCNC: 21 MG/DL — SIGNIFICANT CHANGE UP (ref 7–23)
CALCIUM SERPL-MCNC: 8.8 MG/DL — SIGNIFICANT CHANGE UP (ref 8.4–10.5)
CHLORIDE SERPL-SCNC: 102 MMOL/L — SIGNIFICANT CHANGE UP (ref 96–108)
CO2 SERPL-SCNC: 26 MMOL/L — SIGNIFICANT CHANGE UP (ref 22–31)
CREAT SERPL-MCNC: 1.15 MG/DL — SIGNIFICANT CHANGE UP (ref 0.5–1.3)
EGFR: 68 ML/MIN/1.73M2 — SIGNIFICANT CHANGE UP
EOSINOPHIL # BLD AUTO: 0.44 K/UL — SIGNIFICANT CHANGE UP (ref 0–0.5)
EOSINOPHIL NFR BLD AUTO: 21 % — HIGH (ref 0–6)
GLUCOSE SERPL-MCNC: 104 MG/DL — HIGH (ref 70–99)
HCT VFR BLD CALC: 33.3 % — LOW (ref 39–50)
HGB BLD-MCNC: 11.1 G/DL — LOW (ref 13–17)
INR BLD: 1.37 RATIO — HIGH (ref 0.88–1.16)
LDH SERPL L TO P-CCNC: 427 U/L — HIGH (ref 50–242)
LYMPHOCYTES # BLD AUTO: 0.69 K/UL — LOW (ref 1–3.3)
LYMPHOCYTES # BLD AUTO: 33 % — SIGNIFICANT CHANGE UP (ref 13–44)
MAGNESIUM SERPL-MCNC: 1.7 MG/DL — SIGNIFICANT CHANGE UP (ref 1.6–2.6)
MCHC RBC-ENTMCNC: 30.7 PG — SIGNIFICANT CHANGE UP (ref 27–34)
MCHC RBC-ENTMCNC: 33.3 G/DL — SIGNIFICANT CHANGE UP (ref 32–36)
MCV RBC AUTO: 92.2 FL — SIGNIFICANT CHANGE UP (ref 80–100)
MONOCYTES # BLD AUTO: 0.86 K/UL — SIGNIFICANT CHANGE UP (ref 0–0.9)
MONOCYTES NFR BLD AUTO: 41 % — HIGH (ref 2–14)
NEUTROPHILS # BLD AUTO: 0.06 K/UL — LOW (ref 1.8–7.4)
NEUTROPHILS NFR BLD AUTO: 3 % — LOW (ref 43–77)
NRBC # BLD: 0 /100 — SIGNIFICANT CHANGE UP (ref 0–0)
NRBC # BLD: SIGNIFICANT CHANGE UP /100 WBCS (ref 0–0)
PHOSPHATE SERPL-MCNC: 3 MG/DL — SIGNIFICANT CHANGE UP (ref 2.5–4.5)
PLAT MORPH BLD: NORMAL — SIGNIFICANT CHANGE UP
PLATELET # BLD AUTO: 165 K/UL — SIGNIFICANT CHANGE UP (ref 150–400)
POTASSIUM SERPL-MCNC: 5.1 MMOL/L — SIGNIFICANT CHANGE UP (ref 3.5–5.3)
POTASSIUM SERPL-SCNC: 5.1 MMOL/L — SIGNIFICANT CHANGE UP (ref 3.5–5.3)
PROT SERPL-MCNC: 6.5 G/DL — SIGNIFICANT CHANGE UP (ref 6–8.3)
PROTHROM AB SERPL-ACNC: 16.1 SEC — HIGH (ref 10.5–13.4)
RBC # BLD: 3.61 M/UL — LOW (ref 4.2–5.8)
RBC # FLD: 13.6 % — SIGNIFICANT CHANGE UP (ref 10.3–14.5)
RBC BLD AUTO: SIGNIFICANT CHANGE UP
SODIUM SERPL-SCNC: 142 MMOL/L — SIGNIFICANT CHANGE UP (ref 135–145)
URATE SERPL-MCNC: 6.7 MG/DL — SIGNIFICANT CHANGE UP (ref 3.4–8.8)
WBC # BLD: 2.1 K/UL — LOW (ref 3.8–10.5)
WBC # FLD AUTO: 2.1 K/UL — LOW (ref 3.8–10.5)

## 2023-04-24 DIAGNOSIS — Z51.89 ENCOUNTER FOR OTHER SPECIFIED AFTERCARE: ICD-10-CM

## 2023-04-27 ENCOUNTER — APPOINTMENT (OUTPATIENT)
Dept: HEMATOLOGY ONCOLOGY | Facility: CLINIC | Age: 73
End: 2023-04-27

## 2023-04-27 ENCOUNTER — RESULT REVIEW (OUTPATIENT)
Age: 73
End: 2023-04-27

## 2023-04-27 ENCOUNTER — APPOINTMENT (OUTPATIENT)
Dept: INFUSION THERAPY | Facility: HOSPITAL | Age: 73
End: 2023-04-27

## 2023-04-27 ENCOUNTER — APPOINTMENT (OUTPATIENT)
Dept: HEMATOLOGY ONCOLOGY | Facility: CLINIC | Age: 73
End: 2023-04-27
Payer: MEDICARE

## 2023-04-27 VITALS
WEIGHT: 152.1 LBS | TEMPERATURE: 97.7 F | SYSTOLIC BLOOD PRESSURE: 172 MMHG | DIASTOLIC BLOOD PRESSURE: 68 MMHG | HEART RATE: 90 BPM | RESPIRATION RATE: 16 BRPM | BODY MASS INDEX: 21.21 KG/M2

## 2023-04-27 LAB
BASOPHILS # BLD AUTO: 0 K/UL — SIGNIFICANT CHANGE UP (ref 0–0.2)
BASOPHILS NFR BLD AUTO: 0 % — SIGNIFICANT CHANGE UP (ref 0–2)
EOSINOPHIL # BLD AUTO: 0 K/UL — SIGNIFICANT CHANGE UP (ref 0–0.5)
EOSINOPHIL NFR BLD AUTO: 0 % — SIGNIFICANT CHANGE UP (ref 0–6)
HCT VFR BLD CALC: 31.4 % — LOW (ref 39–50)
HGB BLD-MCNC: 10.3 G/DL — LOW (ref 13–17)
LYMPHOCYTES # BLD AUTO: 0.77 K/UL — LOW (ref 1–3.3)
LYMPHOCYTES # BLD AUTO: 10 % — LOW (ref 13–44)
MCHC RBC-ENTMCNC: 30 PG — SIGNIFICANT CHANGE UP (ref 27–34)
MCHC RBC-ENTMCNC: 32.8 G/DL — SIGNIFICANT CHANGE UP (ref 32–36)
MCV RBC AUTO: 91.5 FL — SIGNIFICANT CHANGE UP (ref 80–100)
MONOCYTES # BLD AUTO: 0.92 K/UL — HIGH (ref 0–0.9)
MONOCYTES NFR BLD AUTO: 12 % — SIGNIFICANT CHANGE UP (ref 2–14)
NEUTROPHILS # BLD AUTO: 5.98 K/UL — SIGNIFICANT CHANGE UP (ref 1.8–7.4)
NEUTROPHILS NFR BLD AUTO: 78 % — HIGH (ref 43–77)
NRBC # BLD: 0 /100 — SIGNIFICANT CHANGE UP (ref 0–0)
NRBC # BLD: SIGNIFICANT CHANGE UP /100 WBCS (ref 0–0)
PLAT MORPH BLD: NORMAL — SIGNIFICANT CHANGE UP
PLATELET # BLD AUTO: 197 K/UL — SIGNIFICANT CHANGE UP (ref 150–400)
RBC # BLD: 3.43 M/UL — LOW (ref 4.2–5.8)
RBC # FLD: 13.7 % — SIGNIFICANT CHANGE UP (ref 10.3–14.5)
RBC BLD AUTO: SIGNIFICANT CHANGE UP
WBC # BLD: 7.67 K/UL — SIGNIFICANT CHANGE UP (ref 3.8–10.5)
WBC # FLD AUTO: 7.67 K/UL — SIGNIFICANT CHANGE UP (ref 3.8–10.5)

## 2023-04-27 PROCEDURE — 99214 OFFICE O/P EST MOD 30 MIN: CPT

## 2023-04-27 NOTE — PHYSICAL EXAM
[Ambulatory and capable of all self care but unable to carry out any work activities] : Status 2- Ambulatory and capable of all self care but unable to carry out any work activities. Up and about more than 50% of waking hours [Thin] : thin [Normal] : affect appropriate [de-identified] : Ambulating [de-identified] : +PPM [de-identified] : Right leg edema, right upper calf fullness, non-tender [de-identified] : right sided abdominal mass not palpable

## 2023-04-27 NOTE — ASSESSMENT
[FreeTextEntry1] : 71 yo male with PMHx significant for follicular lymphoma with DLBCL (tx with R-CHOP in 2003, with relapse in 2009, treated with BR) with his initial presentation at [facility] with multiple areas of palpable lymphadenopathy with follicular lymphoma grade IIIA (IPI score 3) in the setting of a steady decline in weight > 20 lbs in the last 6 months without other constitutional complaints. 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 \par \par He underwent PET-CT in Aug 2022 which showed FDG avid lymph nodes in the left cervical, bilateral supraclavicular regions, and chest, and a few FDG avid abdominal lymph nodes, intramuscular masses in the left posterior chest and left upper thigh, scattered FDG avid subcutaneous soft tissue/nodules with trace right pleural effusion, moderate left pleural effusion, loculated fluid in the right middle lobe. Moderate abdominal and pelvic ascites. Subcutaneous edema in the lower abdominal wall extending into the imaged bilateral thighs. Splenomegaly with mild FDG avidity, suspicious for lymphomatous involvement.\par \par Prior to the PET-CT, he underwent biopsy of both a cervical lymph node and a left axilla lymph node. 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 There were areas of > 20 SUV on the PET-CT, repeat whole lymph node biopsy was requested to confirm suspected diagnosis of Grade 3B FL or transformed large cell lymphoma. S/p excisional biopsy of back lesion on 9/7/22 which showed recurrent DLBCL.\par \par LP 9/26/22: protein elevated at 61, LDH 27, neutrophils 0, lymphocytes 33, monocytes 67, RBC 5. Oligoclonal bands negative. The flow cytometry failed due to insufficient cells. Would consider positive and recommend continued IT MTX therapy going forward x 4 total cycles. He had an interim PET-CT performed on 2/1/23 (3 months after discontinuation of O-miniCHOP in the middle of his therapy - received 3/6 cycles) which showed possible persistent disease including a small, residual focus of increased FDG activity in the left level II region, likely corresponding to a difficult to delineate lymph node, is decreased in size and metabolism (SUV 3.5; image 33; previous SUV 16.1 and skin thickening and subcutaneous nodules, right lower anterior and lateral abdominal wall, slightly more extensive and similar in metabolism (SUV 7.1; image 170; previous SUV 5.5). Discontinued chemoimmunotherapy with Obi-miniCHOP in Nov 2022.\par \par He is now on Tafasitamab (anti-CD19) + lenalidomide since 3/30/23. HELD lenalidomide since 4/20/23 due to severe neutropenia. Today is Cycle 1 Day 29 of Tafasitamab + lenalidomide.\par \par Plan:\par - Repeat Lymphoma labs once per cycle\par - Continue Tafasitamab 12 mg/kg every 2 weeks. Patient is aware this is a lifelong treatment in conjunction with Revlimid.\par - Restart Revlimid at 15 mg x 21 days. While on 20mg he had neutropenia. \par - VTE ppx while on Revlimid; on Apixaban for cardiac issues, Continue 5 mg q12hrs. \par - Antiviral ppx: Acyclovir 400 mg BID\par - Neutropenia ppx: If ANC < 1.0 take Levaquin 500 mg daily. Responded very well with one Zarxio treatment. \par - Behavioral issues: Suspect dementia as he has had fairly consistent times of confusion and aggressive behavior over the past 7 months at least. No evidence of lymphoma CNS involvement.\par - COVID vaccine (Pfizer: 3/2/21 & 3/23/21) and booster 9/21/21 and Oct 2022.\par - Follow up in 1 week\par \par Case and management discussed with Dr. Cordova.\par \par ___\par I personally have spent a total of 45 minutes of time on the date of this encounter reviewing test results, documenting findings, providing education, coordinating care and directly consulting with the patient and/or designated family member.

## 2023-04-27 NOTE — HISTORY OF PRESENT ILLNESS
[Disease:__________________________] : Disease: [unfilled] [Treatment Protocol] : Treatment Protocol [de-identified] : Initial Presentation:\par \par 73 yo with MHx significant for Atrial flutter (on Apixaban), HTN, here for further evaluation of low-level neutrophilia (since 1/2022) and lymphadenopathy. Previously NHL (around 2405-9068?). He received chemotherapy in 2004. 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 In 2010 he went to [place] and was treated for reoccurrence and was treated with bendamustine. He reports that he has had areas of swelling in his neck on and off for about 2 years which was mostly undergoing workup with his endocrinologist. In the last few months he has noticed increasing size of his left cervical LNs and a right nodule that caused swelling of his face, which has now spontaneously decreased in size significantly.\par \par Today he reports he feels well outside of weight loss. He denies any other constitutional complaints. He weighed 192 lbs in 10/2021 which was down to 183 lbs in December 12/2021(2 months later) which has further decreased down to 179 lbs today. He has not felt himself masses outside of his neck region. On 5/14/22, he went for US duplex of his left lower extremity due to a "tangerine size lump" on the back of his left thigh, which noted a 4.4 cm hypoechoic mass in his left inguinal region without commenting on his perceived posterior thigh mass. Also, on 5/2/22 he underwent US of his thyroid and parathyroid glands where they noticed bilateral cervical lymph nodes with the largest on the left side measuring 2.1 x 1.6 x 1.9 cm and a right level 1 LN measuring 2.2 x 1.2 x 2.3 cm. They saw 3 lymph nodes on the left side and one discrete LN on the right.\par \par  He also recently just finished a 14 day course of Levofloxacin for an uncomplicated phenomena. He was having a dry cough and underwent imaging as an outpatient which found mild left and right pleural effusions, areas of consolidation on the right and retrocardiac airspace involvement (performed 5/2/22). He now feels better without infectious complaints.\par \par In addition, he is currently anemic. He last had a colonoscopy in his recent memory. He had bleeding hemorrhoids last year treated with OTC medications. He denies any tick bites recently. \par \par He also has MGUS with 3 separate monoclonal gammopathies I suspect is related to his NHL. He has M Judd 1 showing IgG Lambda at 0.3 g/dl, M Judd 2 showing IgG Kappa at 0.2 g/dl, M Judd 3 showing IgM Lambda (unable to quantitate). There is no suspicion for myeloma or waldenstroms at this time and his M-spikes will be monitored. He has no elevation in kappa/ lambda FLC ration, but individual light chains are both elevated, kappa at 10.77 mg/dL and lambda at 6.58 mg/dL.\par \par Social Hx\par - He is currently retired. He used to be an .\par - No significant environmental exposure to chemicals\par - Lives by himself and his wife lives in [place].\par - Former smoker. He smoked for 10 years less than 1 pack a day. He quit 20 years ago\par \par Family hx\par - Two brothers non Hodgkins lymphoma. At least one required chemotherapy. sister had cervical cancer first diagnosed 2007 and then 3 years later with more cancer discovered\par - Mother and father passed away from heart disease and diabetes.\par \par =======================================================\par Care Providers:\[facility] \par PMD: Dr Amari Hickman\[facility] Endo: Dr Carter Vigil\[facility] Cardiologist: Dr. Don [phone] fax [phone]\[facility] \par =======================================================\par Contacts:\[facility] \par Vonnie (daughter): [phone] - takes all healthcare related calls\[facility] \[facility] ======================================================= [de-identified] : PENDING [de-identified] : Fine Needle Aspiration Report - Auth (Verified)\par Specimen(s) Submitted\par 1. AXILLA, LEFT, US GUIDED CORE BIOPSY AND FNA\par 2. LYMPH NODE, CERVICAL, LEFT POSTERIOR, US GUIDED CORE BIOPSY AND FNA\par \par \par Final Diagnosis\par 1. AXILLA, LEFT, US GUIDED CORE BIOPSY AND FNA\par POSITIVE FOR MALIGNANT CELLS.\par B-cell lymphoma of follicular center origin, most consistent with\par follicular lymphoma, grade 3A.  See note.\par \par Fine Needle Aspiration Addendum Report - Auth (Verified)\par \par Addendum\par 2. LYMPH NODE, CERVICAL, LEFT POSTERIOR, US GUIDED CORE BIOPSY AND FNA\par POSITIVE FOR MALIGNANT CELLS.\par B-cell lymphoma of follicular center origin with high proliferation index.\par See note.\par \par Note:\par The neoplastic B cells demonstrate a follicular center B-cell phenotype with MUM-1 co-expression and a high proliferation index.  Follicular dendritic meshwork is present in most areas of the biopsy, consistent\par with follicular lymphoma.  However, there is one focal area with increased larger cells and lack of follicular dendritic meshwork. Therefore, a high grade component can not be excluded.  If clinically indicated, suggest excisional biopsy for definitive classification.\par \par Immunohistochemical stains   (CD3, CD5, CD10, CD20, CD21, CD23, CD30, BCL-6, BCL-2, cyclinD1, ki-67, c-MYC, PAX-5, MUM-1, p53, ISAURO) were performed on block 2C.  The neoplastic cells are positive for CD20, PAX-5, BCL-6, BCL-2, MUM-1 (partial), dim p53; negative CD5, CD10, CD30, cyclinD1, ISAURO.  CD21 and CD23 highlight follicular dendritic meshwork in most areas with focal absence of follicular dendritic meshwork. \par Ki-67 proliferation index is approximately 60 to 70%.  C-MYC stains approximately 20 to 30% of cells.  CD3 and CD5 highlight some T-cells in the background. [de-identified] : 6/14/22 FNA of Left axilla LN: FLUORESCENCE IN-SITU HYBRIDIZATION (FISH)\par 1. No evidence of MYC Rearrangement\par 2. No evidence of BCL2-IGH [translocation t(14;18)] gene rearrangement\par 3. 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 [FreeTextEntry1] : Tafasitamab  plus Revlimid 20 mg daily 21/28 days. [de-identified] : 5/18/22: Initial Visit.\par \par 6/20/22: Follow-up. Patient feels well overall. Lymphoma labs and left axilla LN biopsy revealed grade 3A follicular lymphoma, IgG Lambda and Kappa MGUS. He reports lymph nodes have been stable. Heart function is stable. He denies having any recent fevers, chills, nausea. No weight changes.\par \par 8/22/22: Follow-up. Patient feels well overall. Awaiting for biopsy results of lymph nodes in his back. He does not have pain in the left back. The left side of his neck gives him discomfort. He has never received chemotherapy nor antibody treatment in the past. No fevers, chills, night sweats. No new noticeable masses  Good appetite, lost 7 lbs (lost a lot of fluid weight due to diuretics). \par \par 10/17/22: Follow up. In the interim, he was hospitalized at [facility] twice (8/27-9/12 & 9/16-10/3). 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 Patient was treated with rasburicase, but developed rasburicase-triggered G6PD hemolysis. Also found to be in acute exacerbation of HFrEF and have a prolonged QTc (likely medication-induced). During hospital stay was found to have altered mental status. 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 In mid-September patient was hospitalized for Encephalopathy (+Rhinovirus/enterovirus) unrelated to the Lymphoma. Today, he feels at baseline. Notes resolution of cervical LNs, and back lesions since starting treatment. Patient denies fever, chills, night sweats, back pain, abdominal pain, chest pain, or shortness of breath. Fair appetite, weight loss due to prolonged hospitalizations.\par \par 11/17/22: Follow-up. On 10/28/22 at home was found down by his daughter found to have fever, STEPHEN and AMS and was admitted for acute metabolic encephalopathy with sepsis 2/2 pneumonia s/p 7 days zosyn with improvement. Today he presents from rehab with his daughter. He is not feeling well. He feels that he is weak and fatigued. He has been doing PT / walking around the facility. He reads when awake. He has a poor appetite, but his family is bringing in food that he likes. No fevers, chills, night sweats. No new lumps or masses.\par \par 2/6/23: Follow-up. He was readmitted [facility] 1/9/23 to 1/20/23 for malaise and cytopenias. In December 2022 he had pneumonia and COVID19 for which there has been a long recovery. Due to these complications, his treatment with chemoimmunotherapy (O-miniCHOP) was discontinued. He is currently staying in a rehab to improve his performance status. He overall feels well today and reports feeling much improved relative to Dec 2022. He eats 3 meals a day, but prefers to eat late at night. His appetite is good and his daughter brings a lot of food supplementation for him. He continues to lose weight however. No other recent illnesses. He had a PET-CT last week.\par \par 3/13/23: Followup. He has not yet rec'd revlimid but is now approved for free drug. Continues to have issues gaining weight, and reports a very good appetite. He has not lost weight at least. He also has been having right foot pain between the ankle and toes since the night of 3/6/23. He also has a rash on toes 1-3. He continues to have right lateral abdominal itchiness around a site of swelling / lump. This has not changed in the past month. He is now back home. He is able to ambulate with a walker. He is not limited by dyspnea. He had edema in the rehab which has resolved. Per family he has also been having intermittent periods where he is not himself and he becomes aggressive toward family members. This has been an ongoing issue for sometime \par \par 3/30/23: Follow-up. Today is Cycle 1 Day 1 of Tafa. He has not yet rec'd revlimid (expected delivery today); is approved for free drug via [employer]. Reports intentional weight gain over the past couple of weeks and ambulating with a walker. Continues to have right foot pain between the ankle and toes since the night of 3/6/23, and have right lateral abdominal itchiness around a site of swelling / lump. This has not changed in the past month. He is now back home. Per family he has intermittent periods where he is not himself and he becomes aggressive toward family members. \par \par 4/6/23: Follow-up. Today is Cycle 1 Day 8 of Tafasitamab. Did receive revlimid on day 1 and has been tolerating Revlimid well. Denies any abdominal issues, continues to have a good appetite. Ambulating with a walker but continues to have right foot pain, and have right lateral abdominal itchiness around a site of swelling / lump. Per family he has intermittent periods where he is not himself and he becomes aggressive toward family members. \par \par 4/13/23: Follow-up. Today is Cycle 1 Day 15 of Tafasitamab. He is tolerating the regimen well. Denies any abdominal issues, continues to have a good appetite. Ambulating with a walker but continues to have right foot pain, and have right lateral abdominal itchiness around a site of swelling / lump. Per family he has intermittent periods where he is not himself and he becomes aggressive toward family members. \par \par 4/20/23: Follow-up. Today is Cycle 1 Day 22. Today he reports feeling well, but has noticed his right leg / foot is swollen and associated with shoe tightness. He reports that he has been taking his apixaban which he takes for cardiac reasons. His ANC is 0, but denies any mucositis, or other infectious issues. No new lumps or masses. His right abd mass is decreasing in size.\par \par 4/27/23: Follow-up. Today is Cycle 1 Day 29. After receiving zarxio he developed a facial rash which is now self resolved. He remains off of Revlimid due to neutropenia. Has the mediport insertion scheduled for May 9th. Today he reports feeling well, his right leg / foot remains stable and swollen; associated with shoe tightness. US Duplex (4/20/23) negative for DVT. He reports that he has been taking his apixaban which he takes for cardiac reasons. Denies any mucositis, or other infectious issues. No new lumps or masses. His right abd mass is decreasing in size.\par \par A comprehensive review of systems was performed including constitutional, eyes, ENT, cardiovascular, respiratory, gastrointestinal, genitourinary, musculoskeletal, integumentary, neurological, psychiatric and hematologic / lymphatic. All pertinent positives are included in the H&P under interval history above and the remaining review of systems listed are negative. \par \par ====================================================\par Treatment Hx:\par \par Obinutuzumab-mini-COEP q21 days x 3 cycles (incomplete due to toxicities and patient preference to switch to more tolerable regimen)\par (Obinutuzumab modified mini-COEP: 400 mg/m² cyclophosphamide, Etoposide (40% dose reduced to 30 mg/m2 IV on day 1 and 60 mg/m2 PO on days 2 and 3 of each cycle), and 2 mg vincristine (flat dose) on day 1 of each cycle, and 100 mg prednisone on days 1–5. Modified from Man et al 2009 Blood "R-CHOP with Etoposide Substituted for Doxorubicin (R-CEOP): Excellent Outcome in Diffuse Large B Cell Lymphoma for Patients with a Contraindication to Anthracyclines." with mini- dosing for elderly patients)\par - Cycle 1 Day 1 given 9/2/22 - third dose of Obinutuzumab held due to AMS, requiring admission to [facility] found to have enterovirus infection\par - Cycle 2 Day 1 given 9/30/22 - Given as an inpatient at [facility]\par - Cycle 3 Day 1 given 10/21/22 - Complicated by pneumonia, requiring admission and rehab placement\par \par Tafasitamab plus Revlimid (changed therapy due to patient and family's wishes due to intolerable toxicities), On 28 day cycles.\par - Cycle 1 Day 1 on 3/30/23 (HELD revlimid from 4/20/23 due to neutropenia)\par - Cycle 2 Day 1 on 4/26/23\par \par \par ====================================================

## 2023-04-29 ENCOUNTER — NON-APPOINTMENT (OUTPATIENT)
Age: 73
End: 2023-04-29

## 2023-05-04 ENCOUNTER — APPOINTMENT (OUTPATIENT)
Dept: INFUSION THERAPY | Facility: HOSPITAL | Age: 73
End: 2023-05-04

## 2023-05-04 ENCOUNTER — RESULT REVIEW (OUTPATIENT)
Age: 73
End: 2023-05-04

## 2023-05-04 ENCOUNTER — APPOINTMENT (OUTPATIENT)
Dept: HEMATOLOGY ONCOLOGY | Facility: CLINIC | Age: 73
End: 2023-05-04
Payer: MEDICARE

## 2023-05-04 DIAGNOSIS — D64.9 ANEMIA, UNSPECIFIED: ICD-10-CM

## 2023-05-04 LAB
BASOPHILS # BLD AUTO: 0.13 K/UL — SIGNIFICANT CHANGE UP (ref 0–0.2)
BASOPHILS NFR BLD AUTO: 1.4 % — SIGNIFICANT CHANGE UP (ref 0–2)
EOSINOPHIL # BLD AUTO: 0.18 K/UL — SIGNIFICANT CHANGE UP (ref 0–0.5)
EOSINOPHIL NFR BLD AUTO: 1.9 % — SIGNIFICANT CHANGE UP (ref 0–6)
HCT VFR BLD CALC: 31.2 % — LOW (ref 39–50)
HGB BLD-MCNC: 10.1 G/DL — LOW (ref 13–17)
IMM GRANULOCYTES NFR BLD AUTO: 0.6 % — SIGNIFICANT CHANGE UP (ref 0–0.9)
LYMPHOCYTES # BLD AUTO: 0.43 K/UL — LOW (ref 1–3.3)
LYMPHOCYTES # BLD AUTO: 4.6 % — LOW (ref 13–44)
MCHC RBC-ENTMCNC: 30.1 PG — SIGNIFICANT CHANGE UP (ref 27–34)
MCHC RBC-ENTMCNC: 32.4 G/DL — SIGNIFICANT CHANGE UP (ref 32–36)
MCV RBC AUTO: 93.1 FL — SIGNIFICANT CHANGE UP (ref 80–100)
MONOCYTES # BLD AUTO: 0.31 K/UL — SIGNIFICANT CHANGE UP (ref 0–0.9)
MONOCYTES NFR BLD AUTO: 3.3 % — SIGNIFICANT CHANGE UP (ref 2–14)
NEUTROPHILS # BLD AUTO: 8.15 K/UL — HIGH (ref 1.8–7.4)
NEUTROPHILS NFR BLD AUTO: 88.2 % — HIGH (ref 43–77)
NRBC # BLD: 0 /100 WBCS — SIGNIFICANT CHANGE UP (ref 0–0)
PLATELET # BLD AUTO: 218 K/UL — SIGNIFICANT CHANGE UP (ref 150–400)
RBC # BLD: 3.35 M/UL — LOW (ref 4.2–5.8)
RBC # FLD: 13.6 % — SIGNIFICANT CHANGE UP (ref 10.3–14.5)
WBC # BLD: 9.26 K/UL — SIGNIFICANT CHANGE UP (ref 3.8–10.5)
WBC # FLD AUTO: 9.26 K/UL — SIGNIFICANT CHANGE UP (ref 3.8–10.5)

## 2023-05-04 PROCEDURE — 99214 OFFICE O/P EST MOD 30 MIN: CPT

## 2023-05-04 NOTE — HISTORY OF PRESENT ILLNESS
[Disease:__________________________] : Disease: [unfilled] [Treatment Protocol] : Treatment Protocol [de-identified] : Initial Presentation:\par \par 71 yo with MHx significant for Atrial flutter (on Apixaban), HTN, here for further evaluation of low-level neutrophilia (since 1/2022) and lymphadenopathy. Previously NHL (around 9822-1228?). He received chemotherapy in 2004. 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 In 2010 he went to [place] and was treated for reoccurrence and was treated with bendamustine. He reports that he has had areas of swelling in his neck on and off for about 2 years which was mostly undergoing workup with his endocrinologist. In the last few months he has noticed increasing size of his left cervical LNs and a right nodule that caused swelling of his face, which has now spontaneously decreased in size significantly.\par \par Today he reports he feels well outside of weight loss. He denies any other constitutional complaints. He weighed 192 lbs in 10/2021 which was down to 183 lbs in December 12/2021(2 months later) which has further decreased down to 179 lbs today. He has not felt himself masses outside of his neck region. On 5/14/22, he went for US duplex of his left lower extremity due to a "tangerine size lump" on the back of his left thigh, which noted a 4.4 cm hypoechoic mass in his left inguinal region without commenting on his perceived posterior thigh mass. Also, on 5/2/22 he underwent US of his thyroid and parathyroid glands where they noticed bilateral cervical lymph nodes with the largest on the left side measuring 2.1 x 1.6 x 1.9 cm and a right level 1 LN measuring 2.2 x 1.2 x 2.3 cm. They saw 3 lymph nodes on the left side and one discrete LN on the right.\par \par  He also recently just finished a 14 day course of Levofloxacin for an uncomplicated phenomena. He was having a dry cough and underwent imaging as an outpatient which found mild left and right pleural effusions, areas of consolidation on the right and retrocardiac airspace involvement (performed 5/2/22). He now feels better without infectious complaints.\par \par In addition, he is currently anemic. He last had a colonoscopy in his recent memory. He had bleeding hemorrhoids last year treated with OTC medications. He denies any tick bites recently. \par \par He also has MGUS with 3 separate monoclonal gammopathies I suspect is related to his NHL. He has M Judd 1 showing IgG Lambda at 0.3 g/dl, M Judd 2 showing IgG Kappa at 0.2 g/dl, M Judd 3 showing IgM Lambda (unable to quantitate). There is no suspicion for myeloma or waldenstroms at this time and his M-spikes will be monitored. He has no elevation in kappa/ lambda FLC ration, but individual light chains are both elevated, kappa at 10.77 mg/dL and lambda at 6.58 mg/dL.\par \par Social Hx\par - He is currently retired. He used to be an .\par - No significant environmental exposure to chemicals\par - Lives by himself and his wife lives in [place].\par - Former smoker. He smoked for 10 years less than 1 pack a day. He quit 20 years ago\par \par Family hx\par - Two brothers non Hodgkins lymphoma. At least one required chemotherapy. sister had cervical cancer first diagnosed 2007 and then 3 years later with more cancer discovered\par - Mother and father passed away from heart disease and diabetes.\par \par =======================================================\par Care Providers:\[facility] \par PMD: Dr Amari Hickman\[facility] Endo: Dr Carter Vigil\[facility] Cardiologist: Dr. Don [phone] fax [phone]\[facility] \par =======================================================\par Contacts:\[facility] \par Vonnie (daughter): [phone] - takes all healthcare related calls\[facility] \[facility] ======================================================= [de-identified] : PENDING [de-identified] : Fine Needle Aspiration Report - Auth (Verified)\par Specimen(s) Submitted\par 1. AXILLA, LEFT, US GUIDED CORE BIOPSY AND FNA\par 2. LYMPH NODE, CERVICAL, LEFT POSTERIOR, US GUIDED CORE BIOPSY AND FNA\par \par \par Final Diagnosis\par 1. AXILLA, LEFT, US GUIDED CORE BIOPSY AND FNA\par POSITIVE FOR MALIGNANT CELLS.\par B-cell lymphoma of follicular center origin, most consistent with\par follicular lymphoma, grade 3A.  See note.\par \par Fine Needle Aspiration Addendum Report - Auth (Verified)\par \par Addendum\par 2. LYMPH NODE, CERVICAL, LEFT POSTERIOR, US GUIDED CORE BIOPSY AND FNA\par POSITIVE FOR MALIGNANT CELLS.\par B-cell lymphoma of follicular center origin with high proliferation index.\par See note.\par \par Note:\par The neoplastic B cells demonstrate a follicular center B-cell phenotype with MUM-1 co-expression and a high proliferation index.  Follicular dendritic meshwork is present in most areas of the biopsy, consistent\par with follicular lymphoma.  However, there is one focal area with increased larger cells and lack of follicular dendritic meshwork. Therefore, a high grade component can not be excluded.  If clinically indicated, suggest excisional biopsy for definitive classification.\par \par Immunohistochemical stains   (CD3, CD5, CD10, CD20, CD21, CD23, CD30, BCL-6, BCL-2, cyclinD1, ki-67, c-MYC, PAX-5, MUM-1, p53, ISAURO) were performed on block 2C.  The neoplastic cells are positive for CD20, PAX-5, BCL-6, BCL-2, MUM-1 (partial), dim p53; negative CD5, CD10, CD30, cyclinD1, ISAURO.  CD21 and CD23 highlight follicular dendritic meshwork in most areas with focal absence of follicular dendritic meshwork. \par Ki-67 proliferation index is approximately 60 to 70%.  C-MYC stains approximately 20 to 30% of cells.  CD3 and CD5 highlight some T-cells in the background. [de-identified] : 6/14/22 FNA of Left axilla LN: FLUORESCENCE IN-SITU HYBRIDIZATION (FISH)\par 1. No evidence of MYC Rearrangement\par 2. No evidence of BCL2-IGH [translocation t(14;18)] gene rearrangement\par 3. 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 [FreeTextEntry1] : Tafasitamab  plus Revlimid 20 mg daily 21/28 days. [de-identified] : 5/18/22: Initial Visit.\par \par 6/20/22: Follow-up. Patient feels well overall. Lymphoma labs and left axilla LN biopsy revealed grade 3A follicular lymphoma, IgG Lambda and Kappa MGUS. He reports lymph nodes have been stable. Heart function is stable. He denies having any recent fevers, chills, nausea. No weight changes.\par \par 8/22/22: Follow-up. Patient feels well overall. Awaiting for biopsy results of lymph nodes in his back. He does not have pain in the left back. The left side of his neck gives him discomfort. He has never received chemotherapy nor antibody treatment in the past. No fevers, chills, night sweats. No new noticeable masses  Good appetite, lost 7 lbs (lost a lot of fluid weight due to diuretics). \par \par 10/17/22: Follow up. In the interim, he was hospitalized at [facility] twice (8/27-9/12 & 9/16-10/3). 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 Patient was treated with rasburicase, but developed rasburicase-triggered G6PD hemolysis. Also found to be in acute exacerbation of HFrEF and have a prolonged QTc (likely medication-induced). During hospital stay was found to have altered mental status. 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 In mid-September patient was hospitalized for Encephalopathy (+Rhinovirus/enterovirus) unrelated to the Lymphoma. Today, he feels at baseline. Notes resolution of cervical LNs, and back lesions since starting treatment. Patient denies fever, chills, night sweats, back pain, abdominal pain, chest pain, or shortness of breath. Fair appetite, weight loss due to prolonged hospitalizations.\par \par 11/17/22: Follow-up. On 10/28/22 at home was found down by his daughter found to have fever, STEPHEN and AMS and was admitted for acute metabolic encephalopathy with sepsis 2/2 pneumonia s/p 7 days zosyn with improvement. Today he presents from rehab with his daughter. He is not feeling well. He feels that he is weak and fatigued. He has been doing PT / walking around the facility. He reads when awake. He has a poor appetite, but his family is bringing in food that he likes. No fevers, chills, night sweats. No new lumps or masses.\par \par 2/6/23: Follow-up. He was readmitted [facility] 1/9/23 to 1/20/23 for malaise and cytopenias. In December 2022 he had pneumonia and COVID19 for which there has been a long recovery. Due to these complications, his treatment with chemoimmunotherapy (O-miniCHOP) was discontinued. He is currently staying in a rehab to improve his performance status. He overall feels well today and reports feeling much improved relative to Dec 2022. He eats 3 meals a day, but prefers to eat late at night. His appetite is good and his daughter brings a lot of food supplementation for him. He continues to lose weight however. No other recent illnesses. He had a PET-CT last week.\par \par 3/13/23: Followup. He has not yet rec'd revlimid but is now approved for free drug. Continues to have issues gaining weight, and reports a very good appetite. He has not lost weight at least. He also has been having right foot pain between the ankle and toes since the night of 3/6/23. He also has a rash on toes 1-3. He continues to have right lateral abdominal itchiness around a site of swelling / lump. This has not changed in the past month. He is now back home. He is able to ambulate with a walker. He is not limited by dyspnea. He had edema in the rehab which has resolved. Per family he has also been having intermittent periods where he is not himself and he becomes aggressive toward family members. This has been an ongoing issue for sometime \par \par 3/30/23: Follow-up. Today is Cycle 1 Day 1 of Tafa. He has not yet rec'd revlimid (expected delivery today); is approved for free drug via [employer]. Reports intentional weight gain over the past couple of weeks and ambulating with a walker. Continues to have right foot pain between the ankle and toes since the night of 3/6/23, and have right lateral abdominal itchiness around a site of swelling / lump. This has not changed in the past month. He is now back home. Per family he has intermittent periods where he is not himself and he becomes aggressive toward family members. \par \par 4/6/23: Follow-up. Today is Cycle 1 Day 8 of Tafasitamab. Did receive revlimid on day 1 and has been tolerating Revlimid well. Denies any abdominal issues, continues to have a good appetite. Ambulating with a walker but continues to have right foot pain, and have right lateral abdominal itchiness around a site of swelling / lump. Per family he has intermittent periods where he is not himself and he becomes aggressive toward family members. \par \par 4/13/23: Follow-up. Today is Cycle 1 Day 15 of Tafasitamab. He is tolerating the regimen well. Denies any abdominal issues, continues to have a good appetite. Ambulating with a walker but continues to have right foot pain, and have right lateral abdominal itchiness around a site of swelling / lump. Per family he has intermittent periods where he is not himself and he becomes aggressive toward family members. \par \par 4/20/23: Follow-up. Today is Cycle 1 Day 22. Today he reports feeling well, but has noticed his right leg / foot is swollen and associated with shoe tightness. He reports that he has been taking his apixaban which he takes for cardiac reasons. His ANC is 0, but denies any mucositis, or other infectious issues. No new lumps or masses. His right abd mass is decreasing in size.\par \par 4/27/23: Follow-up. Today is Cycle 1 Day 29. After receiving zarxio he developed a facial rash which is now self resolved. He remains off of Revlimid due to neutropenia. Has the mediport insertion scheduled for May 9th. Today he reports feeling well, his right leg / foot remains stable and swollen; associated with shoe tightness. US Duplex (4/20/23) negative for DVT. He reports that he has been taking his apixaban which he takes for cardiac reasons. Denies any mucositis, or other infectious issues. No new lumps or masses. His right abd mass is decreasing in size.\par \par 5/4/23: Follow-up. Today is Cycle 1 Day 36; has been unable to receive Tafa due to peripheral access limitation therefore cycle 2 has not been counted. He has restarted Revlimid at a lowered dose of 15mg due to neutropenia, tolerating well without neutropenia thus far. He has the mediport insertion scheduled for May 9th. Today he reports feeling well, his right leg / foot remains stable and swollen;US Duplex (4/20/23) negative for DVT. Remains on apixaban which he takes for cardiac reasons. Denies any mucositis, or other infectious issues. No new lumps or masses. His right abd mass is no longer palpable.\par \par \par A comprehensive review of systems was performed including constitutional, eyes, ENT, cardiovascular, respiratory, gastrointestinal, genitourinary, musculoskeletal, integumentary, neurological, psychiatric and hematologic / lymphatic. All pertinent positives are included in the H&P under interval history above and the remaining review of systems listed are negative. \par \par ====================================================\par Treatment Hx:\par \par Obinutuzumab-mini-COEP q21 days x 3 cycles (incomplete due to toxicities and patient preference to switch to more tolerable regimen)\par (Obinutuzumab modified mini-COEP: 400 mg/m² cyclophosphamide, Etoposide (40% dose reduced to 30 mg/m2 IV on day 1 and 60 mg/m2 PO on days 2 and 3 of each cycle), and 2 mg vincristine (flat dose) on day 1 of each cycle, and 100 mg prednisone on days 1–5. Modified from Man et al 2009 Blood "R-CHOP with Etoposide Substituted for Doxorubicin (R-CEOP): Excellent Outcome in Diffuse Large B Cell Lymphoma for Patients with a Contraindication to Anthracyclines." with mini- dosing for elderly patients)\par - Cycle 1 Day 1 given 9/2/22 - third dose of Obinutuzumab held due to AMS, requiring admission to [facility] found to have enterovirus infection\par - Cycle 2 Day 1 given 9/30/22 - Given as an inpatient at [facility]\par - Cycle 3 Day 1 given 10/21/22 - Complicated by pneumonia, requiring admission and rehab placement\par \par Tafasitamab plus Revlimid (changed therapy due to patient and family's wishes due to intolerable toxicities), On 28 day cycles.\par - Cycle 1 Day 1 on 3/30/23 (HELD revlimid from 4/20/23 due to neutropenia)\par - Cycle 2 Day 1 on 5/11/23\par \par ====================================================

## 2023-05-04 NOTE — PHYSICAL EXAM
[Ambulatory and capable of all self care but unable to carry out any work activities] : Status 2- Ambulatory and capable of all self care but unable to carry out any work activities. Up and about more than 50% of waking hours [Thin] : thin [Normal] : affect appropriate [de-identified] : Ambulating [de-identified] : +PPM [de-identified] : Right leg edema, right upper calf fullness, non-tender [de-identified] : right sided abdominal mass not palpable

## 2023-05-04 NOTE — ASSESSMENT
[FreeTextEntry1] : 73 yo male with PMHx significant for follicular lymphoma with DLBCL (tx with R-CHOP in 2003, with relapse in 2009, treated with BR) with his initial presentation at [facility] with multiple areas of palpable lymphadenopathy with follicular lymphoma grade IIIA (IPI score 3) in the setting of a steady decline in weight > 20 lbs in the last 6 months without other constitutional complaints. 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 \par \par He underwent PET-CT in Aug 2022 which showed FDG avid lymph nodes in the left cervical, bilateral supraclavicular regions, and chest, and a few FDG avid abdominal lymph nodes, intramuscular masses in the left posterior chest and left upper thigh, scattered FDG avid subcutaneous soft tissue/nodules with trace right pleural effusion, moderate left pleural effusion, loculated fluid in the right middle lobe. Moderate abdominal and pelvic ascites. Subcutaneous edema in the lower abdominal wall extending into the imaged bilateral thighs. Splenomegaly with mild FDG avidity, suspicious for lymphomatous involvement.\par \par Prior to the PET-CT, he underwent biopsy of both a cervical lymph node and a left axilla lymph node. 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 There were areas of > 20 SUV on the PET-CT, repeat whole lymph node biopsy was requested to confirm suspected diagnosis of Grade 3B FL or transformed large cell lymphoma. S/p excisional biopsy of back lesion on 9/7/22 which showed recurrent DLBCL.\par \par LP 9/26/22: protein elevated at 61, LDH 27, neutrophils 0, lymphocytes 33, monocytes 67, RBC 5. Oligoclonal bands negative. The flow cytometry failed due to insufficient cells. Would consider positive and recommend continued IT MTX therapy going forward x 4 total cycles. He had an interim PET-CT performed on 2/1/23 (3 months after discontinuation of O-miniCHOP in the middle of his therapy - received 3/6 cycles) which showed possible persistent disease including a small, residual focus of increased FDG activity in the left level II region, likely corresponding to a difficult to delineate lymph node, is decreased in size and metabolism (SUV 3.5; image 33; previous SUV 16.1 and skin thickening and subcutaneous nodules, right lower anterior and lateral abdominal wall, slightly more extensive and similar in metabolism (SUV 7.1; image 170; previous SUV 5.5). Discontinued chemoimmunotherapy with Obi-miniCHOP in Nov 2022.\par \par He is now on Tafasitamab (anti-CD19) + lenalidomide since 3/30/23. HELD lenalidomide since 4/20/23 due to severe neutropenia. Today is Cycle 1 Day 36 of Tafasitamab + lenalidomide.\par \par Plan:\par - Repeat Lymphoma labs once per cycle\par - Continue Tafasitamab 12 mg/kg every 2 weeks; prefers Saturdays. Patient is aware this is a lifelong treatment in conjunction with Revlimid.\par - Continue Revlimid at 15 mg x 21 days. Neutropenic while on higher dose. \par - VTE ppx while on Revlimid; on Apixaban for cardiac issues, Continue 5 mg q12hrs. \par - Antiviral ppx: Acyclovir 400 mg BID\par - Neutropenia ppx: If ANC < 1.0 take Levaquin 500 mg daily. Responded very well with one Zarxio treatment. \par - Behavioral issues: Suspect dementia as he has had fairly consistent times of confusion and aggressive behavior over the past 7 months at least. No evidence of lymphoma CNS involvement.\par - COVID vaccine (Pfizer: 3/2/21 & 3/23/21) and booster 9/21/21 and Oct 2022.\par - Follow up in 1 week\par \par Case and management discussed with Dr. Cordova.\par \par ___\par I personally have spent a total of 45 minutes of time on the date of this encounter reviewing test results, documenting findings, providing education, coordinating care and directly consulting with the patient and/or designated family member.

## 2023-05-07 LAB
ALBUMIN SERPL ELPH-MCNC: 4 G/DL
ALP BLD-CCNC: 134 U/L
ALT SERPL-CCNC: 15 U/L
ANION GAP SERPL CALC-SCNC: 20 MMOL/L
APTT BLD: 34.9 SEC
AST SERPL-CCNC: 14 U/L
BILIRUB SERPL-MCNC: 0.7 MG/DL
BUN SERPL-MCNC: 57 MG/DL
CALCIUM SERPL-MCNC: 9.3 MG/DL
CHLORIDE SERPL-SCNC: 96 MMOL/L
CO2 SERPL-SCNC: 29 MMOL/L
CREAT SERPL-MCNC: 1.91 MG/DL
EGFR: 37 ML/MIN/1.73M2
GLUCOSE SERPL-MCNC: 125 MG/DL
INR PPP: 1.68 RATIO
LDH SERPL-CCNC: 182 U/L
MAGNESIUM SERPL-MCNC: 1.3 MG/DL
PHOSPHATE SERPL-MCNC: 2.8 MG/DL
POTASSIUM SERPL-SCNC: 3.5 MMOL/L
PROT SERPL-MCNC: 7.1 G/DL
PT BLD: 19.8 SEC
SODIUM SERPL-SCNC: 145 MMOL/L

## 2023-05-08 ENCOUNTER — OUTPATIENT (OUTPATIENT)
Dept: OUTPATIENT SERVICES | Facility: HOSPITAL | Age: 73
LOS: 1 days | Discharge: HOME | End: 2023-05-08

## 2023-05-08 DIAGNOSIS — Z95.0 PRESENCE OF CARDIAC PACEMAKER: Chronic | ICD-10-CM

## 2023-05-08 DIAGNOSIS — C85.90 NON-HODGKIN LYMPHOMA, UNSPECIFIED, UNSPECIFIED SITE: Chronic | ICD-10-CM

## 2023-05-08 DIAGNOSIS — C82.20 FOLLICULAR LYMPHOMA GRADE III, UNSPECIFIED, UNSPECIFIED SITE: ICD-10-CM

## 2023-05-09 ENCOUNTER — RESULT REVIEW (OUTPATIENT)
Age: 73
End: 2023-05-09

## 2023-05-09 ENCOUNTER — TRANSCRIPTION ENCOUNTER (OUTPATIENT)
Age: 73
End: 2023-05-09

## 2023-05-09 ENCOUNTER — OUTPATIENT (OUTPATIENT)
Dept: OUTPATIENT SERVICES | Facility: HOSPITAL | Age: 73
LOS: 1 days | End: 2023-05-09
Payer: COMMERCIAL

## 2023-05-09 VITALS
DIASTOLIC BLOOD PRESSURE: 77 MMHG | SYSTOLIC BLOOD PRESSURE: 158 MMHG | RESPIRATION RATE: 18 BRPM | WEIGHT: 149.91 LBS | TEMPERATURE: 98 F | HEIGHT: 70 IN | OXYGEN SATURATION: 100 % | HEART RATE: 66 BPM

## 2023-05-09 VITALS
OXYGEN SATURATION: 98 % | RESPIRATION RATE: 15 BRPM | SYSTOLIC BLOOD PRESSURE: 106 MMHG | DIASTOLIC BLOOD PRESSURE: 70 MMHG | HEART RATE: 65 BPM

## 2023-05-09 DIAGNOSIS — C82.20 FOLLICULAR LYMPHOMA GRADE III, UNSPECIFIED, UNSPECIFIED SITE: ICD-10-CM

## 2023-05-09 DIAGNOSIS — Z95.0 PRESENCE OF CARDIAC PACEMAKER: Chronic | ICD-10-CM

## 2023-05-09 DIAGNOSIS — C85.90 NON-HODGKIN LYMPHOMA, UNSPECIFIED, UNSPECIFIED SITE: Chronic | ICD-10-CM

## 2023-05-09 PROCEDURE — C1769: CPT

## 2023-05-09 PROCEDURE — C1894: CPT

## 2023-05-09 PROCEDURE — 77001 FLUOROGUIDE FOR VEIN DEVICE: CPT

## 2023-05-09 PROCEDURE — 36561 INSERT TUNNELED CV CATH: CPT | Mod: RT

## 2023-05-09 PROCEDURE — 76937 US GUIDE VASCULAR ACCESS: CPT

## 2023-05-09 PROCEDURE — C1788: CPT

## 2023-05-09 PROCEDURE — 36561 INSERT TUNNELED CV CATH: CPT

## 2023-05-09 PROCEDURE — 76937 US GUIDE VASCULAR ACCESS: CPT | Mod: 26

## 2023-05-09 PROCEDURE — 77001 FLUOROGUIDE FOR VEIN DEVICE: CPT | Mod: 26

## 2023-05-09 RX ORDER — ACYCLOVIR SODIUM 500 MG
1 VIAL (EA) INTRAVENOUS
Qty: 0 | Refills: 0 | DISCHARGE

## 2023-05-09 RX ORDER — ONDANSETRON 8 MG/1
4 TABLET, FILM COATED ORAL ONCE
Refills: 0 | Status: DISCONTINUED | OUTPATIENT
Start: 2023-05-09 | End: 2023-05-23

## 2023-05-09 RX ORDER — SODIUM CHLORIDE 9 MG/ML
1000 INJECTION INTRAMUSCULAR; INTRAVENOUS; SUBCUTANEOUS
Refills: 0 | Status: DISCONTINUED | OUTPATIENT
Start: 2023-05-09 | End: 2023-05-23

## 2023-05-09 NOTE — ASU PATIENT PROFILE, ADULT - FALL HARM RISK - HARM RISK INTERVENTIONS

## 2023-05-09 NOTE — ASU DISCHARGE PLAN (ADULT/PEDIATRIC) - NS MD DC FALL RISK RISK
For information on Fall & Injury Prevention, visit: [email or web address] OR  [email or web address] OR  https://www.cdc.gov/steadi/patient.html

## 2023-05-09 NOTE — ASU DISCHARGE PLAN (ADULT/PEDIATRIC) - ASU DC SPECIAL INSTRUCTIONSFT
Chest Port Placement    Discharge Instructions  - You have had a chest port implanted in your chest.   - The port is ready for use.  - You may shower in 48 hours. No soaking or swimming for 2 weeks or until the site is completely healed.  - Keep the area covered and dry for the next 2days. It may be removed by a chemotherapy nurse as needed for treatment.  - Do not perform any heavy lifting or put tension on the area for the next week or until the site is healed.  - You may resume your normal diet.  - You may resume your normal medications however you should wait 48 hours before restarting aspirin, plavix, or blood thinners.  - It is normal to experience some pain over the site for the next few days. You may take Tylenol for that pain. Do not take more frequently than every 6 hours and do not exceed more than 3000mg of Tylenol in a 24 hour period.  - You were given conscious sedation which may make you drowsy, therefore you need someone to stay with you until the morning following the procedure.  - Do not drive, engage in heavy lifting or strenuous activity, or drink any alcoholic beverages for the next 24 hours.   - You may resume normal activity in 24 hours.    Notify your primary physician and/or Interventional Radiology IMMEDIATELY if you experience any of the following       - Fever of 101F or 38C       - Chills or Rigors/ Shakes       - Swelling and/or Redness in the area around the port       - Worsening Pain       - Blood soaked bandages or worsening bleeding       - Lightheadedness and/or dizziness upon standing       - Chest Pain/ Tightness       - Shortness of Breath       - Difficulty walking    If you have a problem that you believe requires IMMEDIATE attention, please go to your NEAREST Emergency Room. If you believe your problem can safely wait until you speak to a physician, please call Interventional Radiology for any concerns.    Please feel free to contact us at [phone] if any problems arise. After 6PM, Monday through Friday, on weekends and on holidays, please call [phone] and ask for the radiology resident on call to be paged.

## 2023-05-09 NOTE — ASU DISCHARGE PLAN (ADULT/PEDIATRIC) - NURSING INSTRUCTIONS
Please feel free to contact us at [phone] if any problems arise. After 6PM, Monday through Friday, on weekends and on holidays, please call [phone] and ask for the radiology resident on call to be paged.

## 2023-05-09 NOTE — PRE PROCEDURE NOTE - PRE PROCEDURE EVALUATION
Interventional Radiology    HPI: 73 yo male with PMHx significant for follicular lymphoma with DLBCL (tx with R-CHOP in 2003, with relapse in 2009, treated with BR) with his initial presentation at [facility] with multiple areas of palpable lymphadenopathy with follicular lymphoma grade IIIA (IPI score 3) in the setting of a steady decline in weight > 20 lbs in the last 6 months without other constitutional complaints. 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 Patient presents for MediPort placement.     Allergies: rasburicase (Other)    Medications (Abx/Cardiac/Anticoagulation/Blood Products)      Data:  177.8  68  T(C): 36.6  HR: 66  BP: 158/77  RR: 18  SpO2: 100%    Exam  General: No acute distress  Chest: Non labored breathing  Abdomen: Non-distended  Extremities: No swelling, warm    -WBC 9.26 / HgB 10.1 / Hct 31.2 / Plt 218      Imaging: Relevant imaging reviewed.     Plan: 72y Male presents for MediPort insertion in the setting of lymphoma.     -Risks/Benefits/alternatives explained with the patient and/or healthcare proxy and witnessed informed consent obtained.    Interventional Radiology    HPI: 71 yo male with PMHx significant for follicular lymphoma with DLBCL (tx with R-CHOP in 2003, with relapse in 2009, treated with BR) with his initial presentation at [facility] with multiple areas of palpable lymphadenopathy with follicular lymphoma grade IIIA (IPI score 3) in the setting of a steady decline in weight > 20 lbs in the last 6 months without other constitutional complaints. 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 Patient presents for MediPort placement. Last dose of Eliquis 5/7/2023.     Allergies: rasburicase (Other)    Medications (Abx/Cardiac/Anticoagulation/Blood Products)      Data:  177.8  68  T(C): 36.6  HR: 66  BP: 158/77  RR: 18  SpO2: 100%    Exam  General: No acute distress  Chest: Non labored breathing  Abdomen: Non-distended  Extremities: No swelling, warm    -WBC 9.26 / HgB 10.1 / Hct 31.2 / Plt 218      Imaging: Relevant imaging reviewed.     Plan: 72y Male presents for MediPort insertion in the setting of lymphoma.     -Risks/Benefits/alternatives explained with the patient and/or healthcare proxy and witnessed informed consent obtained.

## 2023-05-11 ENCOUNTER — RESULT REVIEW (OUTPATIENT)
Age: 73
End: 2023-05-11

## 2023-05-11 ENCOUNTER — APPOINTMENT (OUTPATIENT)
Dept: INFUSION THERAPY | Facility: HOSPITAL | Age: 73
End: 2023-05-11

## 2023-05-11 LAB
ALBUMIN SERPL ELPH-MCNC: 3.8 G/DL — SIGNIFICANT CHANGE UP (ref 3.3–5)
ALBUMIN SERPL ELPH-MCNC: 3.9 G/DL — SIGNIFICANT CHANGE UP (ref 3.3–5)
ALP SERPL-CCNC: 115 U/L — SIGNIFICANT CHANGE UP (ref 40–120)
ALP SERPL-CCNC: 118 U/L — SIGNIFICANT CHANGE UP (ref 40–120)
ALT FLD-CCNC: 13 U/L — SIGNIFICANT CHANGE UP (ref 10–45)
ALT FLD-CCNC: 14 U/L — SIGNIFICANT CHANGE UP (ref 10–45)
ANION GAP SERPL CALC-SCNC: 16 MMOL/L — SIGNIFICANT CHANGE UP (ref 5–17)
ANION GAP SERPL CALC-SCNC: 16 MMOL/L — SIGNIFICANT CHANGE UP (ref 5–17)
AST SERPL-CCNC: 16 U/L — SIGNIFICANT CHANGE UP (ref 10–40)
AST SERPL-CCNC: 16 U/L — SIGNIFICANT CHANGE UP (ref 10–40)
BASOPHILS # BLD AUTO: 0.14 K/UL — SIGNIFICANT CHANGE UP (ref 0–0.2)
BASOPHILS NFR BLD AUTO: 2.1 % — HIGH (ref 0–2)
BILIRUB SERPL-MCNC: 0.4 MG/DL — SIGNIFICANT CHANGE UP (ref 0.2–1.2)
BILIRUB SERPL-MCNC: 0.4 MG/DL — SIGNIFICANT CHANGE UP (ref 0.2–1.2)
BUN SERPL-MCNC: 47 MG/DL — HIGH (ref 7–23)
BUN SERPL-MCNC: 49 MG/DL — HIGH (ref 7–23)
CALCIUM SERPL-MCNC: 9.5 MG/DL — SIGNIFICANT CHANGE UP (ref 8.4–10.5)
CALCIUM SERPL-MCNC: 9.6 MG/DL — SIGNIFICANT CHANGE UP (ref 8.4–10.5)
CHLORIDE SERPL-SCNC: 103 MMOL/L — SIGNIFICANT CHANGE UP (ref 96–108)
CHLORIDE SERPL-SCNC: 104 MMOL/L — SIGNIFICANT CHANGE UP (ref 96–108)
CO2 SERPL-SCNC: 25 MMOL/L — SIGNIFICANT CHANGE UP (ref 22–31)
CO2 SERPL-SCNC: 26 MMOL/L — SIGNIFICANT CHANGE UP (ref 22–31)
CREAT SERPL-MCNC: 1.24 MG/DL — SIGNIFICANT CHANGE UP (ref 0.5–1.3)
CREAT SERPL-MCNC: 1.26 MG/DL — SIGNIFICANT CHANGE UP (ref 0.5–1.3)
EGFR: 61 ML/MIN/1.73M2 — SIGNIFICANT CHANGE UP
EGFR: 62 ML/MIN/1.73M2 — SIGNIFICANT CHANGE UP
EOSINOPHIL # BLD AUTO: 0.56 K/UL — HIGH (ref 0–0.5)
EOSINOPHIL NFR BLD AUTO: 8.4 % — HIGH (ref 0–6)
GLUCOSE SERPL-MCNC: 126 MG/DL — HIGH (ref 70–99)
GLUCOSE SERPL-MCNC: 126 MG/DL — HIGH (ref 70–99)
HCT VFR BLD CALC: 28.3 % — LOW (ref 39–50)
HGB BLD-MCNC: 9.2 G/DL — LOW (ref 13–17)
IMM GRANULOCYTES NFR BLD AUTO: 0.6 % — SIGNIFICANT CHANGE UP (ref 0–0.9)
LDH SERPL L TO P-CCNC: 189 U/L — SIGNIFICANT CHANGE UP (ref 50–242)
LDH SERPL L TO P-CCNC: 222 U/L — SIGNIFICANT CHANGE UP (ref 50–242)
LYMPHOCYTES # BLD AUTO: 0.62 K/UL — LOW (ref 1–3.3)
LYMPHOCYTES # BLD AUTO: 9.3 % — LOW (ref 13–44)
MAGNESIUM SERPL-MCNC: 1.5 MG/DL — LOW (ref 1.6–2.6)
MAGNESIUM SERPL-MCNC: 1.5 MG/DL — LOW (ref 1.6–2.6)
MCHC RBC-ENTMCNC: 30.6 PG — SIGNIFICANT CHANGE UP (ref 27–34)
MCHC RBC-ENTMCNC: 32.5 G/DL — SIGNIFICANT CHANGE UP (ref 32–36)
MCV RBC AUTO: 94 FL — SIGNIFICANT CHANGE UP (ref 80–100)
MONOCYTES # BLD AUTO: 0.35 K/UL — SIGNIFICANT CHANGE UP (ref 0–0.9)
MONOCYTES NFR BLD AUTO: 5.2 % — SIGNIFICANT CHANGE UP (ref 2–14)
NEUTROPHILS # BLD AUTO: 4.99 K/UL — SIGNIFICANT CHANGE UP (ref 1.8–7.4)
NEUTROPHILS NFR BLD AUTO: 74.4 % — SIGNIFICANT CHANGE UP (ref 43–77)
NRBC # BLD: 0 /100 WBCS — SIGNIFICANT CHANGE UP (ref 0–0)
PHOSPHATE SERPL-MCNC: 1.2 MG/DL — LOW (ref 2.5–4.5)
PHOSPHATE SERPL-MCNC: 1.3 MG/DL — LOW (ref 2.5–4.5)
PLATELET # BLD AUTO: 209 K/UL — SIGNIFICANT CHANGE UP (ref 150–400)
POTASSIUM SERPL-MCNC: 3.7 MMOL/L — SIGNIFICANT CHANGE UP (ref 3.5–5.3)
POTASSIUM SERPL-MCNC: 3.7 MMOL/L — SIGNIFICANT CHANGE UP (ref 3.5–5.3)
POTASSIUM SERPL-SCNC: 3.7 MMOL/L — SIGNIFICANT CHANGE UP (ref 3.5–5.3)
POTASSIUM SERPL-SCNC: 3.7 MMOL/L — SIGNIFICANT CHANGE UP (ref 3.5–5.3)
PROT SERPL-MCNC: 6.6 G/DL — SIGNIFICANT CHANGE UP (ref 6–8.3)
PROT SERPL-MCNC: 6.7 G/DL — SIGNIFICANT CHANGE UP (ref 6–8.3)
RBC # BLD: 3.01 M/UL — LOW (ref 4.2–5.8)
RBC # FLD: 13.3 % — SIGNIFICANT CHANGE UP (ref 10.3–14.5)
SODIUM SERPL-SCNC: 144 MMOL/L — SIGNIFICANT CHANGE UP (ref 135–145)
SODIUM SERPL-SCNC: 146 MMOL/L — HIGH (ref 135–145)
URATE SERPL-MCNC: 11.5 MG/DL — HIGH (ref 3.4–8.8)
URATE SERPL-MCNC: 11.6 MG/DL — HIGH (ref 3.4–8.8)
WBC # BLD: 6.7 K/UL — SIGNIFICANT CHANGE UP (ref 3.8–10.5)
WBC # FLD AUTO: 6.7 K/UL — SIGNIFICANT CHANGE UP (ref 3.8–10.5)

## 2023-05-12 DIAGNOSIS — Z51.11 ENCOUNTER FOR ANTINEOPLASTIC CHEMOTHERAPY: ICD-10-CM

## 2023-05-12 DIAGNOSIS — R11.2 NAUSEA WITH VOMITING, UNSPECIFIED: ICD-10-CM

## 2023-05-12 DIAGNOSIS — C85.10 UNSPECIFIED B-CELL LYMPHOMA, UNSPECIFIED SITE: ICD-10-CM

## 2023-05-17 DIAGNOSIS — C85.90 NON-HODGKIN LYMPHOMA, UNSPECIFIED, UNSPECIFIED SITE: ICD-10-CM

## 2023-05-17 DIAGNOSIS — Z45.2 ENCOUNTER FOR ADJUSTMENT AND MANAGEMENT OF VASCULAR ACCESS DEVICE: ICD-10-CM

## 2023-05-23 LAB
ALBUMIN SERPL ELPH-MCNC: 4 G/DL
ALP BLD-CCNC: 127 U/L
ALT SERPL-CCNC: 14 U/L
ANION GAP SERPL CALC-SCNC: 17 MMOL/L
AST SERPL-CCNC: 15 U/L
B2 MICROGLOB SERPL-MCNC: 5.6 MG/L
BILIRUB SERPL-MCNC: 0.4 MG/DL
BUN SERPL-MCNC: 51 MG/DL
CALCIUM SERPL-MCNC: 9.7 MG/DL
CHLORIDE SERPL-SCNC: 102 MMOL/L
CO2 SERPL-SCNC: 26 MMOL/L
CREAT SERPL-MCNC: 1.39 MG/DL
EGFR: 54 ML/MIN/1.73M2
GLUCOSE SERPL-MCNC: 157 MG/DL
LDH SERPL-CCNC: 184 U/L
POTASSIUM SERPL-SCNC: 3.9 MMOL/L
PROT SERPL-MCNC: 7.1 G/DL
SODIUM SERPL-SCNC: 145 MMOL/L
URATE SERPL-MCNC: 11.7 MG/DL

## 2023-05-25 ENCOUNTER — RESULT REVIEW (OUTPATIENT)
Age: 73
End: 2023-05-25

## 2023-05-25 ENCOUNTER — APPOINTMENT (OUTPATIENT)
Dept: INFUSION THERAPY | Facility: HOSPITAL | Age: 73
End: 2023-05-25

## 2023-05-25 ENCOUNTER — APPOINTMENT (OUTPATIENT)
Dept: HEMATOLOGY ONCOLOGY | Facility: CLINIC | Age: 73
End: 2023-05-25
Payer: MEDICARE

## 2023-05-25 ENCOUNTER — APPOINTMENT (OUTPATIENT)
Dept: HEMATOLOGY ONCOLOGY | Facility: CLINIC | Age: 73
End: 2023-05-25

## 2023-05-25 LAB
ALBUMIN SERPL ELPH-MCNC: 4 G/DL — SIGNIFICANT CHANGE UP (ref 3.3–5)
ALBUMIN SERPL ELPH-MCNC: 4.2 G/DL — SIGNIFICANT CHANGE UP (ref 3.3–5)
ALP SERPL-CCNC: 109 U/L — SIGNIFICANT CHANGE UP (ref 40–120)
ALP SERPL-CCNC: 116 U/L — SIGNIFICANT CHANGE UP (ref 40–120)
ALT FLD-CCNC: 10 U/L — SIGNIFICANT CHANGE UP (ref 10–45)
ALT FLD-CCNC: 9 U/L — LOW (ref 10–45)
ANION GAP SERPL CALC-SCNC: 16 MMOL/L — SIGNIFICANT CHANGE UP (ref 5–17)
ANION GAP SERPL CALC-SCNC: 18 MMOL/L — HIGH (ref 5–17)
AST SERPL-CCNC: 12 U/L — SIGNIFICANT CHANGE UP (ref 10–40)
AST SERPL-CCNC: 13 U/L — SIGNIFICANT CHANGE UP (ref 10–40)
B2 MICROGLOB SERPL-MCNC: 10.5 MG/L — HIGH (ref 0.8–2.2)
BASOPHILS # BLD AUTO: 0.02 K/UL — SIGNIFICANT CHANGE UP (ref 0–0.2)
BASOPHILS NFR BLD AUTO: 0.4 % — SIGNIFICANT CHANGE UP (ref 0–2)
BILIRUB SERPL-MCNC: 0.3 MG/DL — SIGNIFICANT CHANGE UP (ref 0.2–1.2)
BILIRUB SERPL-MCNC: 0.4 MG/DL — SIGNIFICANT CHANGE UP (ref 0.2–1.2)
BUN SERPL-MCNC: 48 MG/DL — HIGH (ref 7–23)
BUN SERPL-MCNC: 50 MG/DL — HIGH (ref 7–23)
CALCIUM SERPL-MCNC: 8.5 MG/DL — SIGNIFICANT CHANGE UP (ref 8.4–10.5)
CALCIUM SERPL-MCNC: 9.2 MG/DL — SIGNIFICANT CHANGE UP (ref 8.4–10.5)
CHLORIDE SERPL-SCNC: 101 MMOL/L — SIGNIFICANT CHANGE UP (ref 96–108)
CHLORIDE SERPL-SCNC: 105 MMOL/L — SIGNIFICANT CHANGE UP (ref 96–108)
CO2 SERPL-SCNC: 24 MMOL/L — SIGNIFICANT CHANGE UP (ref 22–31)
CO2 SERPL-SCNC: 24 MMOL/L — SIGNIFICANT CHANGE UP (ref 22–31)
CREAT SERPL-MCNC: 2 MG/DL — HIGH (ref 0.5–1.3)
CREAT SERPL-MCNC: 2.17 MG/DL — HIGH (ref 0.5–1.3)
EGFR: 32 ML/MIN/1.73M2 — LOW
EGFR: 35 ML/MIN/1.73M2 — LOW
EOSINOPHIL # BLD AUTO: 0.23 K/UL — SIGNIFICANT CHANGE UP (ref 0–0.5)
EOSINOPHIL NFR BLD AUTO: 4.5 % — SIGNIFICANT CHANGE UP (ref 0–6)
GLUCOSE SERPL-MCNC: 84 MG/DL — SIGNIFICANT CHANGE UP (ref 70–99)
GLUCOSE SERPL-MCNC: 90 MG/DL — SIGNIFICANT CHANGE UP (ref 70–99)
HCT VFR BLD CALC: 31.8 % — LOW (ref 39–50)
HGB BLD-MCNC: 10.3 G/DL — LOW (ref 13–17)
IMM GRANULOCYTES NFR BLD AUTO: 1.2 % — HIGH (ref 0–0.9)
LDH SERPL L TO P-CCNC: 225 U/L — SIGNIFICANT CHANGE UP (ref 50–242)
LDH SERPL L TO P-CCNC: 243 U/L — HIGH (ref 50–242)
LYMPHOCYTES # BLD AUTO: 0.68 K/UL — LOW (ref 1–3.3)
LYMPHOCYTES # BLD AUTO: 13.3 % — SIGNIFICANT CHANGE UP (ref 13–44)
MAGNESIUM SERPL-MCNC: 1.6 MG/DL — SIGNIFICANT CHANGE UP (ref 1.6–2.6)
MAGNESIUM SERPL-MCNC: 1.8 MG/DL — SIGNIFICANT CHANGE UP (ref 1.6–2.6)
MCHC RBC-ENTMCNC: 30.9 PG — SIGNIFICANT CHANGE UP (ref 27–34)
MCHC RBC-ENTMCNC: 32.4 G/DL — SIGNIFICANT CHANGE UP (ref 32–36)
MCV RBC AUTO: 95.5 FL — SIGNIFICANT CHANGE UP (ref 80–100)
MONOCYTES # BLD AUTO: 0.36 K/UL — SIGNIFICANT CHANGE UP (ref 0–0.9)
MONOCYTES NFR BLD AUTO: 7.1 % — SIGNIFICANT CHANGE UP (ref 2–14)
NEUTROPHILS # BLD AUTO: 3.75 K/UL — SIGNIFICANT CHANGE UP (ref 1.8–7.4)
NEUTROPHILS NFR BLD AUTO: 73.5 % — SIGNIFICANT CHANGE UP (ref 43–77)
NRBC # BLD: 0 /100 WBCS — SIGNIFICANT CHANGE UP (ref 0–0)
PHOSPHATE SERPL-MCNC: 5.4 MG/DL — HIGH (ref 2.5–4.5)
PHOSPHATE SERPL-MCNC: 6 MG/DL — HIGH (ref 2.5–4.5)
PLATELET # BLD AUTO: 105 K/UL — LOW (ref 150–400)
POTASSIUM SERPL-MCNC: 4.4 MMOL/L — SIGNIFICANT CHANGE UP (ref 3.5–5.3)
POTASSIUM SERPL-MCNC: 4.6 MMOL/L — SIGNIFICANT CHANGE UP (ref 3.5–5.3)
POTASSIUM SERPL-SCNC: 4.4 MMOL/L — SIGNIFICANT CHANGE UP (ref 3.5–5.3)
POTASSIUM SERPL-SCNC: 4.6 MMOL/L — SIGNIFICANT CHANGE UP (ref 3.5–5.3)
PROT SERPL-MCNC: 6.5 G/DL — SIGNIFICANT CHANGE UP (ref 6–8.3)
PROT SERPL-MCNC: 7.1 G/DL — SIGNIFICANT CHANGE UP (ref 6–8.3)
RBC # BLD: 3.33 M/UL — LOW (ref 4.2–5.8)
RBC # FLD: 16.6 % — HIGH (ref 10.3–14.5)
SODIUM SERPL-SCNC: 143 MMOL/L — SIGNIFICANT CHANGE UP (ref 135–145)
SODIUM SERPL-SCNC: 145 MMOL/L — SIGNIFICANT CHANGE UP (ref 135–145)
URATE SERPL-MCNC: 10.1 MG/DL — HIGH (ref 3.4–8.8)
URATE SERPL-MCNC: 9.4 MG/DL — HIGH (ref 3.4–8.8)
WBC # BLD: 5.1 K/UL — SIGNIFICANT CHANGE UP (ref 3.8–10.5)
WBC # FLD AUTO: 5.1 K/UL — SIGNIFICANT CHANGE UP (ref 3.8–10.5)

## 2023-05-25 PROCEDURE — 99214 OFFICE O/P EST MOD 30 MIN: CPT

## 2023-05-25 NOTE — ASSESSMENT
[FreeTextEntry1] : 72 yo male with PMHx significant for follicular lymphoma with DLBCL (tx with R-CHOP in 2003, with relapse in 2009, treated with BR) with his initial presentation at [facility] with multiple areas of palpable lymphadenopathy with follicular lymphoma grade IIIA (IPI score 3) in the setting of a steady decline in weight > 20 lbs in the last 6 months without other constitutional complaints. 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 \par \par He underwent PET-CT in Aug 2022 which showed FDG avid lymph nodes in the left cervical, bilateral supraclavicular regions, and chest, and a few FDG avid abdominal lymph nodes, intramuscular masses in the left posterior chest and left upper thigh, scattered FDG avid subcutaneous soft tissue/nodules with trace right pleural effusion, moderate left pleural effusion, loculated fluid in the right middle lobe. Moderate abdominal and pelvic ascites. Subcutaneous edema in the lower abdominal wall extending into the imaged bilateral thighs. Splenomegaly with mild FDG avidity, suspicious for lymphomatous involvement.\par \par Prior to the PET-CT, he underwent biopsy of both a cervical lymph node and a left axilla lymph node. 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 There were areas of > 20 SUV on the PET-CT, repeat whole lymph node biopsy was requested to confirm suspected diagnosis of Grade 3B FL or transformed large cell lymphoma. S/p excisional biopsy of back lesion on 9/7/22 which showed recurrent DLBCL.\par \par LP 9/26/22: protein elevated at 61, LDH 27, neutrophils 0, lymphocytes 33, monocytes 67, RBC 5. Oligoclonal bands negative. The flow cytometry failed due to insufficient cells. Would consider positive and recommend continued IT MTX therapy going forward x 4 total cycles. He had an interim PET-CT performed on 2/1/23 (3 months after discontinuation of O-miniCHOP in the middle of his therapy - received 3/6 cycles) which showed possible persistent disease including a small, residual focus of increased FDG activity in the left level II region, likely corresponding to a difficult to delineate lymph node, is decreased in size and metabolism (SUV 3.5; image 33; previous SUV 16.1 and skin thickening and subcutaneous nodules, right lower anterior and lateral abdominal wall, slightly more extensive and similar in metabolism (SUV 7.1; image 170; previous SUV 5.5). Discontinued chemoimmunotherapy with Obi-miniCHOP in Nov 2022.\par \par He is now on Tafasitamab (anti-CD19) + lenalidomide since 3/30/23. Lenalidomide dose reduced from 20mg due to severe neutropenia. Today is Cycle 2 Day 15 of Tafasitamab + lenalidomide.\par \par Plan:\par - Repeat Lymphoma labs once per cycle\par - Continue Tafasitamab 12 mg/kg every 2 weeks; prefers Saturdays. Patient is aware this is a lifelong treatment in conjunction with Revlimid.\par - Continue Revlimid at 15 mg x 21 days. CBC with every treatment visit to monitor for neutropenia.\par - VTE ppx while on Revlimid; on Apixaban for cardiac issues, Continue 5 mg q12hrs. \par - Continue Allopurinol\par - Antiviral ppx: Acyclovir 400 mg BID\par - Neutropenia ppx: If ANC < 1.0 take Levaquin 500 mg daily. Responded very well with one Zarxio treatment previously. \par - Behavioral issues: Suspect dementia as he has had fairly consistent times of confusion and aggressive behavior over the past 7 months at least. No evidence of lymphoma CNS involvement.\par - COVID vaccine (Pfizer: 3/2/21 & 3/23/21) and booster 9/21/21 and Oct 2022.\par - Follow up monthly\par \par Case and management discussed with Dr. Cordova.\par \par ___\par I personally have spent a total of 45 minutes of time on the date of this encounter reviewing test results, documenting findings, providing education, coordinating care and directly consulting with the patient and/or designated family member.

## 2023-05-25 NOTE — HISTORY OF PRESENT ILLNESS
[Disease:__________________________] : Disease: [unfilled] [Treatment Protocol] : Treatment Protocol [de-identified] : Initial Presentation:\par \par 73 yo with MHx significant for Atrial flutter (on Apixaban), HTN, here for further evaluation of low-level neutrophilia (since 1/2022) and lymphadenopathy. Previously NHL (around [id number]?). He received chemotherapy in 2004. 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 In 2010 he went to [place] and was treated for reoccurrence and was treated with bendamustine. He reports that he has had areas of swelling in his neck on and off for about 2 years which was mostly undergoing workup with his endocrinologist. In the last few months he has noticed increasing size of his left cervical LNs and a right nodule that caused swelling of his face, which has now spontaneously decreased in size significantly.\par \par Today he reports he feels well outside of weight loss. He denies any other constitutional complaints. He weighed 192 lbs in 10/2021 which was down to 183 lbs in December 12/2021(2 months later) which has further decreased down to 179 lbs today. He has not felt himself masses outside of his neck region. On 5/14/22, he went for US duplex of his left lower extremity due to a "tangerine size lump" on the back of his left thigh, which noted a 4.4 cm hypoechoic mass in his left inguinal region without commenting on his perceived posterior thigh mass. Also, on 5/2/22 he underwent US of his thyroid and parathyroid glands where they noticed bilateral cervical lymph nodes with the largest on the left side measuring 2.1 x 1.6 x 1.9 cm and a right level 1 LN measuring 2.2 x 1.2 x 2.3 cm. They saw 3 lymph nodes on the left side and one discrete LN on the right.\par \par  He also recently just finished a 14 day course of Levofloxacin for an uncomplicated phenomena. He was having a dry cough and underwent imaging as an outpatient which found mild left and right pleural effusions, areas of consolidation on the right and retrocardiac airspace involvement (performed 5/2/22). He now feels better without infectious complaints.\par \par In addition, he is currently anemic. He last had a colonoscopy in his recent memory. He had bleeding hemorrhoids last year treated with OTC medications. He denies any tick bites recently. \par \par He also has MGUS with 3 separate monoclonal gammopathies I suspect is related to his NHL. He has M Judd 1 showing IgG Lambda at 0.3 g/dl, M Judd 2 showing IgG Kappa at 0.2 g/dl, M Judd 3 showing IgM Lambda (unable to quantitate). There is no suspicion for myeloma or waldenstroms at this time and his M-spikes will be monitored. He has no elevation in kappa/ lambda FLC ration, but individual light chains are both elevated, kappa at 10.77 mg/dL and lambda at 6.58 mg/dL.\par \par Social Hx\par - He is currently retired. He used to be an .\par - No significant environmental exposure to chemicals\par - Lives by himself and his wife lives in [place].\par - Former smoker. He smoked for 10 years less than 1 pack a day. He quit 20 years ago\par \par Family hx\par - Two brothers non Hodgkins lymphoma. At least one required chemotherapy. sister had cervical cancer first diagnosed 2007 and then 3 years later with more cancer discovered\par - Mother and father passed away from heart disease and diabetes.\par \par =======================================================\par Care Providers:\[facility] \par PMD: Dr Amari Hickman\[facility] Endo: Dr Carter Vigil\[facility] Cardiologist: Dr. Don [phone] fax [phone]\[facility] \par =======================================================\par Contacts:\[facility] \par Vonnie (daughter): [phone] - takes all healthcare related calls\[facility] \[facility] ======================================================= [de-identified] : PENDING [de-identified] : Fine Needle Aspiration Report - Auth (Verified)\par Specimen(s) Submitted\par 1. AXILLA, LEFT, US GUIDED CORE BIOPSY AND FNA\par 2. LYMPH NODE, CERVICAL, LEFT POSTERIOR, US GUIDED CORE BIOPSY AND FNA\par \par \par Final Diagnosis\par 1. AXILLA, LEFT, US GUIDED CORE BIOPSY AND FNA\par POSITIVE FOR MALIGNANT CELLS.\par B-cell lymphoma of follicular center origin, most consistent with\par follicular lymphoma, grade 3A.  See note.\par \par Fine Needle Aspiration Addendum Report - Auth (Verified)\par \par Addendum\par 2. LYMPH NODE, CERVICAL, LEFT POSTERIOR, US GUIDED CORE BIOPSY AND FNA\par POSITIVE FOR MALIGNANT CELLS.\par B-cell lymphoma of follicular center origin with high proliferation index.\par See note.\par \par Note:\par The neoplastic B cells demonstrate a follicular center B-cell phenotype with MUM-1 co-expression and a high proliferation index.  Follicular dendritic meshwork is present in most areas of the biopsy, consistent\par with follicular lymphoma.  However, there is one focal area with increased larger cells and lack of follicular dendritic meshwork. Therefore, a high grade component can not be excluded.  If clinically indicated, suggest excisional biopsy for definitive classification.\par \par Immunohistochemical stains   (CD3, CD5, CD10, CD20, CD21, CD23, CD30, BCL-6, BCL-2, cyclinD1, ki-67, c-MYC, PAX-5, MUM-1, p53, ISAURO) were performed on block 2C.  The neoplastic cells are positive for CD20, PAX-5, BCL-6, BCL-2, MUM-1 (partial), dim p53; negative CD5, CD10, CD30, cyclinD1, ISAURO.  CD21 and CD23 highlight follicular dendritic meshwork in most areas with focal absence of follicular dendritic meshwork. \par Ki-67 proliferation index is approximately 60 to 70%.  C-MYC stains approximately 20 to 30% of cells.  CD3 and CD5 highlight some T-cells in the background. [de-identified] : 6/14/22 FNA of Left axilla LN: FLUORESCENCE IN-SITU HYBRIDIZATION (FISH)\par 1. No evidence of MYC Rearrangement\par 2. No evidence of BCL2-IGH [translocation t(14;18)] gene rearrangement\par 3. 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 [FreeTextEntry1] : Tafasitamab  plus Revlimid 20 mg daily 21/28 days. [de-identified] : 5/18/22: Initial Visit.\par \par 6/20/22: Follow-up. Patient feels well overall. Lymphoma labs and left axilla LN biopsy revealed grade 3A follicular lymphoma, IgG Lambda and Kappa MGUS. He reports lymph nodes have been stable. Heart function is stable. He denies having any recent fevers, chills, nausea. No weight changes.\par \par 8/22/22: Follow-up. Patient feels well overall. Awaiting for biopsy results of lymph nodes in his back. He does not have pain in the left back. The left side of his neck gives him discomfort. He has never received chemotherapy nor antibody treatment in the past. No fevers, chills, night sweats. No new noticeable masses  Good appetite, lost 7 lbs (lost a lot of fluid weight due to diuretics). \par \par 10/17/22: Follow up. In the interim, he was hospitalized at [facility] twice (8/27-9/12 & 9/16-10/3). 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 Patient was treated with rasburicase, but developed rasburicase-triggered G6PD hemolysis. Also found to be in acute exacerbation of HFrEF and have a prolonged QTc (likely medication-induced). During hospital stay was found to have altered mental status. 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 In mid-September patient was hospitalized for Encephalopathy (+Rhinovirus/enterovirus) unrelated to the Lymphoma. Today, he feels at baseline. Notes resolution of cervical LNs, and back lesions since starting treatment. Patient denies fever, chills, night sweats, back pain, abdominal pain, chest pain, or shortness of breath. Fair appetite, weight loss due to prolonged hospitalizations.\par \par 11/17/22: Follow-up. On 10/28/22 at home was found down by his daughter found to have fever, STEPHEN and AMS and was admitted for acute metabolic encephalopathy with sepsis 2/2 pneumonia s/p 7 days zosyn with improvement. Today he presents from rehab with his daughter. He is not feeling well. He feels that he is weak and fatigued. He has been doing PT / walking around the facility. He reads when awake. He has a poor appetite, but his family is bringing in food that he likes. No fevers, chills, night sweats. No new lumps or masses.\par \par 2/6/23: Follow-up. He was readmitted [facility] 1/9/23 to 1/20/23 for malaise and cytopenias. In December 2022 he had pneumonia and COVID19 for which there has been a long recovery. Due to these complications, his treatment with chemoimmunotherapy (O-miniCHOP) was discontinued. He is currently staying in a rehab to improve his performance status. He overall feels well today and reports feeling much improved relative to Dec 2022. He eats 3 meals a day, but prefers to eat late at night. His appetite is good and his daughter brings a lot of food supplementation for him. He continues to lose weight however. No other recent illnesses. He had a PET-CT last week.\par \par 3/13/23: Followup. He has not yet rec'd revlimid but is now approved for free drug. Continues to have issues gaining weight, and reports a very good appetite. He has not lost weight at least. He also has been having right foot pain between the ankle and toes since the night of 3/6/23. He also has a rash on toes 1-3. He continues to have right lateral abdominal itchiness around a site of swelling / lump. This has not changed in the past month. He is now back home. He is able to ambulate with a walker. He is not limited by dyspnea. He had edema in the rehab which has resolved. Per family he has also been having intermittent periods where he is not himself and he becomes aggressive toward family members. This has been an ongoing issue for sometime \par \par 3/30/23: Follow-up. Today is Cycle 1 Day 1 of Tafa. He has not yet rec'd revlimid (expected delivery today); is approved for free drug via [employer] Reports intentional weight gain over the past couple of weeks and ambulating with a walker. Continues to have right foot pain between the ankle and toes since the night of 3/6/23, and have right lateral abdominal itchiness around a site of swelling / lump. This has not changed in the past month. He is now back home. Per family he has intermittent periods where he is not himself and he becomes aggressive toward family members. \par \par 4/6/23: Follow-up. Today is Cycle 1 Day 8 of Tafasitamab. Did receive revlimid on day 1 and has been tolerating Revlimid well. Denies any abdominal issues, continues to have a good appetite. Ambulating with a walker but continues to have right foot pain, and have right lateral abdominal itchiness around a site of swelling / lump. Per family he has intermittent periods where he is not himself and he becomes aggressive toward family members. \par \par 4/13/23: Follow-up. Today is Cycle 1 Day 15 of Tafasitamab. He is tolerating the regimen well. Denies any abdominal issues, continues to have a good appetite. Ambulating with a walker but continues to have right foot pain, and have right lateral abdominal itchiness around a site of swelling / lump. Per family he has intermittent periods where he is not himself and he becomes aggressive toward family members. \par \par 4/20/23: Follow-up. Today is Cycle 1 Day 22. Today he reports feeling well, but has noticed his right leg / foot is swollen and associated with shoe tightness. He reports that he has been taking his apixaban which he takes for cardiac reasons. His ANC is 0, but denies any mucositis, or other infectious issues. No new lumps or masses. His right abd mass is decreasing in size.\par \par 4/27/23: Follow-up. Today is Cycle 1 Day 29. After receiving zarxio he developed a facial rash which is now self resolved. He remains off of Revlimid due to neutropenia. Has the mediport insertion scheduled for May 9th. Today he reports feeling well, his right leg / foot remains stable and swollen; associated with shoe tightness. US Duplex (4/20/23) negative for DVT. He reports that he has been taking his apixaban which he takes for cardiac reasons. Denies any mucositis, or other infectious issues. No new lumps or masses. His right abd mass is decreasing in size.\par \par 5/4/23: Follow-up. Today is Cycle 1 Day 36; has been unable to receive Tafa due to peripheral access limitation therefore cycle 2 has not been counted. He has restarted Revlimid at a lowered dose of 15mg due to neutropenia, tolerating well without neutropenia thus far. He has the mediport insertion scheduled for May 9th. Today he reports feeling well, his right leg / foot remains stable and swollen;US Duplex (4/20/23) negative for DVT. Remains on apixaban which he takes for cardiac reasons. Denies any mucositis, or other infectious issues. No new lumps or masses. His right abd mass is no longer palpable.\par \par 5/25/23: Follow-up. Today is Cycle 2 Day 15. He has a mediport in place now and is able to receive the Tafasitamab without issue, tolerating Revlimid well at a lowered dose of 15mg due to neutropenia. Today he reports feeling well, his intermittent right leg swelling is much improved. Has been hydrating well recently, given elevated BUN. Remains on apixaban which he takes for cardiac reasons. Denies any bleeding, mucositis, masses/lumps, fever, chills, or night sweats. Good appetite.\par \par A comprehensive review of systems was performed including constitutional, eyes, ENT, cardiovascular, respiratory, gastrointestinal, genitourinary, musculoskeletal, integumentary, neurological, psychiatric and hematologic / lymphatic. All pertinent positives are included in the H&P under interval history above and the remaining review of systems listed are negative. \par \par ====================================================\par Treatment Hx:\par \par Obinutuzumab-mini-COEP q21 days x 3 cycles (incomplete due to toxicities and patient preference to switch to more tolerable regimen)\par (Obinutuzumab modified mini-COEP: 400 mg/m² cyclophosphamide, Etoposide (40% dose reduced to 30 mg/m2 IV on day 1 and 60 mg/m2 PO on days 2 and 3 of each cycle), and 2 mg vincristine (flat dose) on day 1 of each cycle, and 100 mg prednisone on days 1–5. Modified from Man et al 2009 Blood "R-CHOP with Etoposide Substituted for Doxorubicin (R-CEOP): Excellent Outcome in Diffuse Large B Cell Lymphoma for Patients with a Contraindication to Anthracyclines." with mini- dosing for elderly patients)\par - Cycle 1 Day 1 given 9/2/22 - third dose of Obinutuzumab held due to AMS, requiring admission to [facility] found to have enterovirus infection\par - Cycle 2 Day 1 given 9/30/22 - Given as an inpatient at [facility]\par - Cycle 3 Day 1 given 10/21/22 - Complicated by pneumonia, requiring admission and rehab placement\par \par Tafasitamab plus Revlimid (changed therapy due to patient and family's wishes due to intolerable toxicities), On 28 day cycles.\par - Cycle 1 Day 1 on 3/30/23 (HELD revlimid briefly starting 4/20/23 due to neutropenia)\par - Cycle 2 Day 1 on 5/11/23\par \par ====================================================

## 2023-05-25 NOTE — PHYSICAL EXAM
[Ambulatory and capable of all self care but unable to carry out any work activities] : Status 2- Ambulatory and capable of all self care but unable to carry out any work activities. Up and about more than 50% of waking hours [Thin] : thin [Normal] : normoactive bowel sounds, soft and nontender, no hepatosplenomegaly or masses appreciated [de-identified] : Ambulating [de-identified] : +PPM [de-identified] : RLE trace edema

## 2023-05-26 DIAGNOSIS — E86.0 DEHYDRATION: ICD-10-CM

## 2023-06-05 ENCOUNTER — APPOINTMENT (OUTPATIENT)
Dept: ELECTROPHYSIOLOGY | Facility: CLINIC | Age: 73
End: 2023-06-05
Payer: MEDICARE

## 2023-06-05 ENCOUNTER — NON-APPOINTMENT (OUTPATIENT)
Age: 73
End: 2023-06-05

## 2023-06-05 PROCEDURE — 93294 REM INTERROG EVL PM/LDLS PM: CPT

## 2023-06-05 PROCEDURE — 93296 REM INTERROG EVL PM/IDS: CPT

## 2023-06-10 ENCOUNTER — APPOINTMENT (OUTPATIENT)
Dept: HEMATOLOGY ONCOLOGY | Facility: CLINIC | Age: 73
End: 2023-06-10

## 2023-06-10 ENCOUNTER — RESULT REVIEW (OUTPATIENT)
Age: 73
End: 2023-06-10

## 2023-06-10 ENCOUNTER — APPOINTMENT (OUTPATIENT)
Dept: INFUSION THERAPY | Facility: HOSPITAL | Age: 73
End: 2023-06-10

## 2023-06-10 LAB
BASOPHILS # BLD AUTO: 0 K/UL — SIGNIFICANT CHANGE UP (ref 0–0.2)
BASOPHILS NFR BLD AUTO: 0 % — SIGNIFICANT CHANGE UP (ref 0–2)
EOSINOPHIL # BLD AUTO: 0.28 K/UL — SIGNIFICANT CHANGE UP (ref 0–0.5)
EOSINOPHIL NFR BLD AUTO: 22 % — HIGH (ref 0–6)
HCT VFR BLD CALC: 28.8 % — LOW (ref 39–50)
HGB BLD-MCNC: 9.6 G/DL — LOW (ref 13–17)
LYMPHOCYTES # BLD AUTO: 0.52 K/UL — LOW (ref 1–3.3)
LYMPHOCYTES # BLD AUTO: 40 % — SIGNIFICANT CHANGE UP (ref 13–44)
MCHC RBC-ENTMCNC: 31.8 PG — SIGNIFICANT CHANGE UP (ref 27–34)
MCHC RBC-ENTMCNC: 33.3 G/DL — SIGNIFICANT CHANGE UP (ref 32–36)
MCV RBC AUTO: 95.4 FL — SIGNIFICANT CHANGE UP (ref 80–100)
MONOCYTES # BLD AUTO: 0.21 K/UL — SIGNIFICANT CHANGE UP (ref 0–0.9)
MONOCYTES NFR BLD AUTO: 16 % — HIGH (ref 2–14)
NEUTROPHILS # BLD AUTO: 0.28 K/UL — LOW (ref 1.8–7.4)
NEUTROPHILS NFR BLD AUTO: 22 % — LOW (ref 43–77)
NRBC # BLD: 0 /100 — SIGNIFICANT CHANGE UP (ref 0–0)
NRBC # BLD: SIGNIFICANT CHANGE UP /100 WBCS (ref 0–0)
PLAT MORPH BLD: NORMAL — SIGNIFICANT CHANGE UP
PLATELET # BLD AUTO: 111 K/UL — LOW (ref 150–400)
RBC # BLD: 3.02 M/UL — LOW (ref 4.2–5.8)
RBC # FLD: 16.1 % — HIGH (ref 10.3–14.5)
RBC BLD AUTO: SIGNIFICANT CHANGE UP
URATE SERPL-MCNC: 6.9 MG/DL — SIGNIFICANT CHANGE UP (ref 3.4–8.8)
WBC # BLD: 1.29 K/UL — LOW (ref 3.8–10.5)
WBC # FLD AUTO: 1.29 K/UL — LOW (ref 3.8–10.5)

## 2023-06-12 NOTE — PROGRESS NOTE ADULT - PROBLEM SELECTOR PLAN 3
- Admitted with sepsis. Pt on chemotherapy for non-Hodgkins Lymphoma making him immunocompromised.  - Sepsis cause unclear, could be PNA, r/o other etiology (GI)   - Pulm consulted, appreciate recommendations               - Defer thoracentesis for now due to similar findings on prior CT, suspect less contribution to fever.                - c/w zosyn for empiric coverage (10/29-) for 7 days total.   -c/w home acyclovir ppx - Admitted with sepsis. Pt on chemotherapy for non-Hodgkins Lymphoma making him immunocompromised.  - Sepsis cause unclear, could be PNA, r/o other etiology (GI)   - Pulm consulted, appreciate recommendations               - Defer thoracentesis for now due to similar findings on prior CT, suspect less contribution to fever.                - c/w zosyn for empiric coverage (10/29-10/4) for 7 days total.   -c/w home acyclovir ppx 13-Jun-2023 07:00

## 2023-06-13 ENCOUNTER — APPOINTMENT (OUTPATIENT)
Dept: GERIATRICS | Facility: CLINIC | Age: 73
End: 2023-06-13
Payer: MEDICARE

## 2023-06-13 VITALS
RESPIRATION RATE: 15 BRPM | TEMPERATURE: 97.7 F | BODY MASS INDEX: 21.9 KG/M2 | OXYGEN SATURATION: 96 % | DIASTOLIC BLOOD PRESSURE: 73 MMHG | SYSTOLIC BLOOD PRESSURE: 130 MMHG | HEART RATE: 99 BPM | WEIGHT: 157 LBS

## 2023-06-13 DIAGNOSIS — I50.23 ACUTE ON CHRONIC SYSTOLIC (CONGESTIVE) HEART FAILURE: ICD-10-CM

## 2023-06-13 PROCEDURE — 99213 OFFICE O/P EST LOW 20 MIN: CPT | Mod: GE

## 2023-06-17 ENCOUNTER — APPOINTMENT (OUTPATIENT)
Dept: INFUSION THERAPY | Facility: HOSPITAL | Age: 73
End: 2023-06-17

## 2023-06-19 ENCOUNTER — INPATIENT (INPATIENT)
Facility: HOSPITAL | Age: 73
LOS: 4 days | Discharge: HOME | DRG: 683 | End: 2023-06-24
Attending: HOSPITALIST | Admitting: INTERNAL MEDICINE
Payer: COMMERCIAL

## 2023-06-19 ENCOUNTER — RESULT REVIEW (OUTPATIENT)
Age: 73
End: 2023-06-19

## 2023-06-19 ENCOUNTER — APPOINTMENT (OUTPATIENT)
Dept: CARDIOLOGY | Facility: CLINIC | Age: 73
End: 2023-06-19

## 2023-06-19 ENCOUNTER — APPOINTMENT (OUTPATIENT)
Dept: HEMATOLOGY ONCOLOGY | Facility: CLINIC | Age: 73
End: 2023-06-19
Payer: MEDICARE

## 2023-06-19 ENCOUNTER — APPOINTMENT (OUTPATIENT)
Dept: NEUROLOGY | Facility: CLINIC | Age: 73
End: 2023-06-19

## 2023-06-19 VITALS
RESPIRATION RATE: 16 BRPM | HEART RATE: 65 BPM | SYSTOLIC BLOOD PRESSURE: 95 MMHG | BODY MASS INDEX: 21.77 KG/M2 | WEIGHT: 156.09 LBS | DIASTOLIC BLOOD PRESSURE: 58 MMHG | TEMPERATURE: 97.9 F

## 2023-06-19 VITALS
HEART RATE: 65 BPM | RESPIRATION RATE: 20 BRPM | WEIGHT: 156.09 LBS | OXYGEN SATURATION: 99 % | HEIGHT: 70 IN | TEMPERATURE: 97 F | DIASTOLIC BLOOD PRESSURE: 75 MMHG | SYSTOLIC BLOOD PRESSURE: 146 MMHG

## 2023-06-19 VITALS — DIASTOLIC BLOOD PRESSURE: 41 MMHG | SYSTOLIC BLOOD PRESSURE: 92 MMHG

## 2023-06-19 VITALS — OXYGEN SATURATION: 99 %

## 2023-06-19 DIAGNOSIS — C82.90 FOLLICULAR LYMPHOMA, UNSPECIFIED, UNSPECIFIED SITE: ICD-10-CM

## 2023-06-19 DIAGNOSIS — I95.9 HYPOTENSION, UNSPECIFIED: ICD-10-CM

## 2023-06-19 DIAGNOSIS — N17.9 ACUTE KIDNEY FAILURE, UNSPECIFIED: ICD-10-CM

## 2023-06-19 DIAGNOSIS — C85.90 NON-HODGKIN LYMPHOMA, UNSPECIFIED, UNSPECIFIED SITE: Chronic | ICD-10-CM

## 2023-06-19 DIAGNOSIS — Z95.0 PRESENCE OF CARDIAC PACEMAKER: Chronic | ICD-10-CM

## 2023-06-19 DIAGNOSIS — F03.90 UNSPECIFIED DEMENTIA, UNSPECIFIED SEVERITY, WITHOUT BEHAVIORAL DISTURBANCE, PSYCHOTIC DISTURBANCE, MOOD DISTURBANCE, AND ANXIETY: ICD-10-CM

## 2023-06-19 DIAGNOSIS — I48.92 UNSPECIFIED ATRIAL FLUTTER: ICD-10-CM

## 2023-06-19 DIAGNOSIS — E78.00 PURE HYPERCHOLESTEROLEMIA, UNSPECIFIED: ICD-10-CM

## 2023-06-19 DIAGNOSIS — Z29.9 ENCOUNTER FOR PROPHYLACTIC MEASURES, UNSPECIFIED: ICD-10-CM

## 2023-06-19 LAB
ALBUMIN SERPL ELPH-MCNC: 3.8 G/DL — SIGNIFICANT CHANGE UP (ref 3.3–5)
ALP SERPL-CCNC: 115 U/L — SIGNIFICANT CHANGE UP (ref 40–120)
ALT FLD-CCNC: 12 U/L — SIGNIFICANT CHANGE UP (ref 10–45)
ANION GAP SERPL CALC-SCNC: 20 MMOL/L — HIGH (ref 5–17)
APPEARANCE UR: CLEAR — SIGNIFICANT CHANGE UP
APTT BLD: 27.6 SEC — SIGNIFICANT CHANGE UP (ref 27.5–35.5)
AST SERPL-CCNC: 19 U/L — SIGNIFICANT CHANGE UP (ref 10–40)
BACTERIA # UR AUTO: NEGATIVE — SIGNIFICANT CHANGE UP
BASE EXCESS BLDV CALC-SCNC: -0.8 MMOL/L — SIGNIFICANT CHANGE UP (ref -2–3)
BASE EXCESS BLDV CALC-SCNC: 3.9 MMOL/L — HIGH (ref -2–3)
BASOPHILS # BLD AUTO: 0.08 K/UL — SIGNIFICANT CHANGE UP (ref 0–0.2)
BASOPHILS # BLD AUTO: 0.11 K/UL — SIGNIFICANT CHANGE UP (ref 0–0.2)
BASOPHILS NFR BLD AUTO: 5 % — HIGH (ref 0–2)
BASOPHILS NFR BLD AUTO: 7.6 % — HIGH (ref 0–2)
BILIRUB SERPL-MCNC: 0.5 MG/DL — SIGNIFICANT CHANGE UP (ref 0.2–1.2)
BILIRUB UR-MCNC: NEGATIVE — SIGNIFICANT CHANGE UP
BUN SERPL-MCNC: 61 MG/DL — HIGH (ref 7–23)
CA-I SERPL-SCNC: 1.04 MMOL/L — LOW (ref 1.15–1.33)
CA-I SERPL-SCNC: 1.1 MMOL/L — LOW (ref 1.15–1.33)
CALCIUM SERPL-MCNC: 9 MG/DL — SIGNIFICANT CHANGE UP (ref 8.4–10.5)
CHLORIDE BLDV-SCNC: 101 MMOL/L — SIGNIFICANT CHANGE UP (ref 96–108)
CHLORIDE BLDV-SCNC: 108 MMOL/L — SIGNIFICANT CHANGE UP (ref 96–108)
CHLORIDE SERPL-SCNC: 98 MMOL/L — SIGNIFICANT CHANGE UP (ref 96–108)
CO2 BLDV-SCNC: 24 MMOL/L — SIGNIFICANT CHANGE UP (ref 22–26)
CO2 BLDV-SCNC: 28 MMOL/L — HIGH (ref 22–26)
CO2 SERPL-SCNC: 21 MMOL/L — LOW (ref 22–31)
COLOR SPEC: SIGNIFICANT CHANGE UP
CREAT SERPL-MCNC: 3.16 MG/DL — HIGH (ref 0.5–1.3)
DIFF PNL FLD: NEGATIVE — SIGNIFICANT CHANGE UP
EGFR: 20 ML/MIN/1.73M2 — LOW
EOSINOPHIL # BLD AUTO: 0.06 K/UL — SIGNIFICANT CHANGE UP (ref 0–0.5)
EOSINOPHIL # BLD AUTO: 0.12 K/UL — SIGNIFICANT CHANGE UP (ref 0–0.5)
EOSINOPHIL NFR BLD AUTO: 4 % — SIGNIFICANT CHANGE UP (ref 0–6)
EOSINOPHIL NFR BLD AUTO: 8.5 % — HIGH (ref 0–6)
EPI CELLS # UR: 0 /HPF — SIGNIFICANT CHANGE UP
GAS PNL BLDV: 137 MMOL/L — SIGNIFICANT CHANGE UP (ref 136–145)
GAS PNL BLDV: 138 MMOL/L — SIGNIFICANT CHANGE UP (ref 136–145)
GAS PNL BLDV: SIGNIFICANT CHANGE UP
GLUCOSE BLDV-MCNC: 73 MG/DL — SIGNIFICANT CHANGE UP (ref 70–99)
GLUCOSE BLDV-MCNC: 78 MG/DL — SIGNIFICANT CHANGE UP (ref 70–99)
GLUCOSE SERPL-MCNC: 82 MG/DL — SIGNIFICANT CHANGE UP (ref 70–99)
GLUCOSE UR QL: NEGATIVE — SIGNIFICANT CHANGE UP
HCO3 BLDV-SCNC: 23 MMOL/L — SIGNIFICANT CHANGE UP (ref 22–29)
HCO3 BLDV-SCNC: 27 MMOL/L — SIGNIFICANT CHANGE UP (ref 22–29)
HCT VFR BLD CALC: 30.5 % — LOW (ref 39–50)
HCT VFR BLD CALC: 30.8 % — LOW (ref 39–50)
HCT VFR BLDA CALC: 27 % — LOW (ref 39–51)
HCT VFR BLDA CALC: 30 % — LOW (ref 39–51)
HGB BLD CALC-MCNC: 9.1 G/DL — LOW (ref 12.6–17.4)
HGB BLD CALC-MCNC: 9.9 G/DL — LOW (ref 12.6–17.4)
HGB BLD-MCNC: 10 G/DL — LOW (ref 13–17)
HGB BLD-MCNC: 10.3 G/DL — LOW (ref 13–17)
HYALINE CASTS # UR AUTO: 1 /LPF — SIGNIFICANT CHANGE UP (ref 0–2)
INR BLD: 1.43 RATIO — HIGH (ref 0.88–1.16)
KETONES UR-MCNC: NEGATIVE — SIGNIFICANT CHANGE UP
LACTATE BLDV-MCNC: 1.7 MMOL/L — SIGNIFICANT CHANGE UP (ref 0.5–2)
LACTATE BLDV-MCNC: 2.6 MMOL/L — HIGH (ref 0.5–2)
LEUKOCYTE ESTERASE UR-ACNC: NEGATIVE — SIGNIFICANT CHANGE UP
LYMPHOCYTES # BLD AUTO: 0.51 K/UL — LOW (ref 1–3.3)
LYMPHOCYTES # BLD AUTO: 0.58 K/UL — LOW (ref 1–3.3)
LYMPHOCYTES # BLD AUTO: 34.8 % — SIGNIFICANT CHANGE UP (ref 13–44)
LYMPHOCYTES # BLD AUTO: 36 % — SIGNIFICANT CHANGE UP (ref 13–44)
MAGNESIUM SERPL-MCNC: 1.4 MG/DL — LOW (ref 1.6–2.6)
MANUAL SMEAR VERIFICATION: SIGNIFICANT CHANGE UP
MCHC RBC-ENTMCNC: 31.2 PG — SIGNIFICANT CHANGE UP (ref 27–34)
MCHC RBC-ENTMCNC: 31.9 PG — SIGNIFICANT CHANGE UP (ref 27–34)
MCHC RBC-ENTMCNC: 32.5 GM/DL — SIGNIFICANT CHANGE UP (ref 32–36)
MCHC RBC-ENTMCNC: 33.8 G/DL — SIGNIFICANT CHANGE UP (ref 32–36)
MCV RBC AUTO: 94.4 FL — SIGNIFICANT CHANGE UP (ref 80–100)
MCV RBC AUTO: 96 FL — SIGNIFICANT CHANGE UP (ref 80–100)
MONOCYTES # BLD AUTO: 0.56 K/UL — SIGNIFICANT CHANGE UP (ref 0–0.9)
MONOCYTES # BLD AUTO: 0.71 K/UL — SIGNIFICANT CHANGE UP (ref 0–0.9)
MONOCYTES NFR BLD AUTO: 38.1 % — HIGH (ref 2–14)
MONOCYTES NFR BLD AUTO: 44 % — HIGH (ref 2–14)
NEUTROPHILS # BLD AUTO: 0.16 K/UL — LOW (ref 1.8–7.4)
NEUTROPHILS # BLD AUTO: 0.18 K/UL — LOW (ref 1.8–7.4)
NEUTROPHILS NFR BLD AUTO: 11 % — LOW (ref 43–77)
NEUTROPHILS NFR BLD AUTO: 11 % — LOW (ref 43–77)
NITRITE UR-MCNC: NEGATIVE — SIGNIFICANT CHANGE UP
NRBC # BLD: 0 /100 — SIGNIFICANT CHANGE UP (ref 0–0)
NRBC # BLD: SIGNIFICANT CHANGE UP /100 WBCS (ref 0–0)
PCO2 BLDV: 34 MMHG — LOW (ref 42–55)
PCO2 BLDV: 36 MMHG — LOW (ref 42–55)
PH BLDV: 7.44 — HIGH (ref 7.32–7.43)
PH BLDV: 7.49 — HIGH (ref 7.32–7.43)
PH UR: 6.5 — SIGNIFICANT CHANGE UP (ref 5–8)
PLAT MORPH BLD: ABNORMAL
PLAT MORPH BLD: NORMAL — SIGNIFICANT CHANGE UP
PLATELET # BLD AUTO: 173 K/UL — SIGNIFICANT CHANGE UP (ref 150–400)
PLATELET # BLD AUTO: 195 K/UL — SIGNIFICANT CHANGE UP (ref 150–400)
PO2 BLDV: 19 MMHG — LOW (ref 25–45)
PO2 BLDV: 42 MMHG — SIGNIFICANT CHANGE UP (ref 25–45)
POLYCHROMASIA BLD QL SMEAR: SLIGHT — SIGNIFICANT CHANGE UP
POTASSIUM BLDV-SCNC: 3 MMOL/L — LOW (ref 3.5–5.1)
POTASSIUM BLDV-SCNC: 3.9 MMOL/L — SIGNIFICANT CHANGE UP (ref 3.5–5.1)
POTASSIUM SERPL-MCNC: 4.2 MMOL/L — SIGNIFICANT CHANGE UP (ref 3.5–5.3)
POTASSIUM SERPL-SCNC: 4.2 MMOL/L — SIGNIFICANT CHANGE UP (ref 3.5–5.3)
PROT SERPL-MCNC: 7.2 G/DL — SIGNIFICANT CHANGE UP (ref 6–8.3)
PROT UR-MCNC: SIGNIFICANT CHANGE UP
PROTHROM AB SERPL-ACNC: 16.7 SEC — HIGH (ref 10.5–13.4)
RAPID RVP RESULT: SIGNIFICANT CHANGE UP
RBC # BLD: 3.21 M/UL — LOW (ref 4.2–5.8)
RBC # BLD: 3.23 M/UL — LOW (ref 4.2–5.8)
RBC # FLD: 15.8 % — HIGH (ref 10.3–14.5)
RBC # FLD: 15.9 % — HIGH (ref 10.3–14.5)
RBC BLD AUTO: SIGNIFICANT CHANGE UP
RBC BLD AUTO: SIGNIFICANT CHANGE UP
RBC CASTS # UR COMP ASSIST: 0 /HPF — SIGNIFICANT CHANGE UP (ref 0–4)
SAO2 % BLDV: 24.6 % — LOW (ref 67–88)
SAO2 % BLDV: 68 % — SIGNIFICANT CHANGE UP (ref 67–88)
SARS-COV-2 RNA SPEC QL NAA+PROBE: SIGNIFICANT CHANGE UP
SODIUM SERPL-SCNC: 139 MMOL/L — SIGNIFICANT CHANGE UP (ref 135–145)
SP GR SPEC: 1.01 — LOW (ref 1.01–1.02)
TROPONIN T, HIGH SENSITIVITY RESULT: 99 NG/L — HIGH (ref 0–51)
URATE SERPL-MCNC: 9.6 MG/DL — HIGH (ref 3.4–8.8)
UROBILINOGEN FLD QL: NEGATIVE — SIGNIFICANT CHANGE UP
WBC # BLD: 1.46 K/UL — LOW (ref 3.8–10.5)
WBC # BLD: 1.62 K/UL — LOW (ref 3.8–10.5)
WBC # FLD AUTO: 1.46 K/UL — LOW (ref 3.8–10.5)
WBC # FLD AUTO: 1.62 K/UL — LOW (ref 3.8–10.5)
WBC UR QL: 1 /HPF — SIGNIFICANT CHANGE UP (ref 0–5)

## 2023-06-19 PROCEDURE — 73130 X-RAY EXAM OF HAND: CPT | Mod: 26,LT

## 2023-06-19 PROCEDURE — 76770 US EXAM ABDO BACK WALL COMP: CPT | Mod: 26

## 2023-06-19 PROCEDURE — 99443: CPT

## 2023-06-19 PROCEDURE — 99215 OFFICE O/P EST HI 40 MIN: CPT

## 2023-06-19 PROCEDURE — 99223 1ST HOSP IP/OBS HIGH 75: CPT | Mod: GC

## 2023-06-19 PROCEDURE — 71045 X-RAY EXAM CHEST 1 VIEW: CPT | Mod: 26

## 2023-06-19 PROCEDURE — 99285 EMERGENCY DEPT VISIT HI MDM: CPT

## 2023-06-19 RX ORDER — SODIUM CHLORIDE 9 MG/ML
1000 INJECTION INTRAMUSCULAR; INTRAVENOUS; SUBCUTANEOUS ONCE
Refills: 0 | Status: COMPLETED | OUTPATIENT
Start: 2023-06-19 | End: 2023-06-19

## 2023-06-19 RX ORDER — MAGNESIUM SULFATE 500 MG/ML
2 VIAL (ML) INJECTION ONCE
Refills: 0 | Status: COMPLETED | OUTPATIENT
Start: 2023-06-19 | End: 2023-06-19

## 2023-06-19 RX ADMIN — Medication 2 MILLIGRAM(S): at 20:17

## 2023-06-19 RX ADMIN — SODIUM CHLORIDE 1000 MILLILITER(S): 9 INJECTION INTRAMUSCULAR; INTRAVENOUS; SUBCUTANEOUS at 17:53

## 2023-06-19 RX ADMIN — Medication 25 GRAM(S): at 19:12

## 2023-06-19 NOTE — ED ADULT NURSE NOTE - NSFALLHARMRISKINTERV_ED_ALL_ED

## 2023-06-19 NOTE — ED PROVIDER NOTE - PROGRESS NOTE DETAILS
Case discussed with hematology/oncology fellow Dr. Tavarez who will follow patient in hospital, agrees with admission to medicine. - Emmett Gomez PA-C Discussed case with hospitalist, will admit for STEPHEN.  Hospitalist requesting renal ultrasound and postvoid residual though patient urinated freely at bedside, will ensure not retaining.  Orders placed. - Emmett Gomez PA-C

## 2023-06-19 NOTE — ASSESSMENT
[FreeTextEntry1] : 72 yo male with PMHx significant for follicular lymphoma with DLBCL (tx with R-CHOP in 2003, with relapse in 2009, treated with BR) with his initial presentation at [facility] with multiple areas of palpable lymphadenopathy with follicular lymphoma grade IIIA (IPI score 3) in the setting of a steady decline in weight > 20 lbs in the previous 6 months without other constitutional complaints. 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 \par \par Due to symptoms / clinical picture, he was started on therapy. He is s/p 3 cycles of obinutuzumab + mini-CHOP, however this was d/c'd due to intolerability / complications. He is now on second-line therapy with Tafasitamab (anti-CD19) + lenalidomide (15 mg 21/28 days) since 3/30/23. Today is Cycle 3 Day 12 of Tafasitamab + lenalidomide.\par \par Plan:\par - Repeat Lymphoma labs once per cycle\par - 6/29/23 with hypotension down to 86/58 in setting of sore throat and chills. Daughter reports he was confused today and having visual hallucinations. He is neutropenic. Will send to ED for septic work up.\par - Continue Tafasitamab 12 mg/kg every 2 weeks until progression or intolerance once cleared of an acute process.\par - HOLD Revlimid. once neutrophils recovery will change dose to 10 mg x 21 days. CBC with every treatment visit to monitor for neutropenia.\par - VTE ppx while on Revlimid; on Apixaban for cardiac issues, Continue 5 mg q12hrs. \par - Continue Allopurinol\par - Antiviral ppx: Acyclovir 400 mg BID\par - Neutropenia ppx: If ANC < 1.0 and expected to remain low for >= 7 days, take Levaquin 500 mg daily.\par - Behavioral issues: Suspect dementia as he has had fairly consistent times of confusion and aggressive behavior over the past 7 months at least. No concrete evidence of lymphoma CNS involvement at present.\par - COVID vaccine ([employer]: 3/2/21 & 3/23/21) and booster 9/21/21 and Oct 2022.\par - For regular visits,  follow up monthly\par \par ___\par I personally have spent a total of 40 minutes of time on the date of this encounter reviewing test results, documenting findings, providing education, coordinating care and directly consulting with the patient and/or designated family member.

## 2023-06-19 NOTE — H&P ADULT - NSHPREVIEWOFSYSTEMS_GEN_ALL_CORE
REVIEW OF SYSTEMS:    CONSTITUTIONAL: No weakness, fevers or chills  EYES/ENT: No visual changes;  No vertigo or throat pain   NECK: No pain or stiffness  RESPIRATORY: No cough, wheezing, hemoptysis; No shortness of breath  CARDIOVASCULAR: No chest pain or palpitations  GASTROINTESTINAL: No abdominal or epigastric pain. No nausea, vomiting, or hematemesis; No diarrhea or constipation. No melena or hematochezia.  GENITOURINARY: No dysuria, frequency or hematuria  NEUROLOGICAL: No numbness or weakness  SKIN: No itching, burning, rashes, or lesions   All other review of systems is negative unless indicated above. REVIEW OF SYSTEMS:    CONSTITUTIONAL: +weakness. No fevers. + chills  EYES/ENT: No visual changes;  No vertigo. + throat pain   NECK: No pain or stiffness  RESPIRATORY: No cough, wheezing, hemoptysis; No shortness of breath  CARDIOVASCULAR: No chest pain or palpitations  GASTROINTESTINAL: No abdominal or epigastric pain. No nausea, vomiting, or hematemesis; No diarrhea or constipation. No melena or hematochezia.  GENITOURINARY: No dysuria, frequency or hematuria  NEUROLOGICAL: No numbness. + weakness  SKIN: No itching, burning, rashes, or lesions   All other review of systems is negative unless indicated above.

## 2023-06-19 NOTE — H&P ADULT - PROBLEM SELECTOR PLAN 7
DVT Prophylaxis: on Apixaban 5 mg bid  Dispo: Home - for agitation, can continue with ativan as needed   - 1:1 - c/w home atorvastatin 40 mg qhs

## 2023-06-19 NOTE — ED PROVIDER NOTE - DIFFERENTIAL DIAGNOSIS
Ddx includes, however, is not limited to: infection, cardiac d/o, dehydration, other Differential Diagnosis

## 2023-06-19 NOTE — ED ADULT NURSE NOTE - OBJECTIVE STATEMENT
73 year old male presented to ED from cancer center with c/o of hypotension and AMS starting today while receiving chemotherapy for non hodgkin's lymphoma. hx pleural effusion, impaired memory, systolic HF, HTN, DM2, cardiac pacemaker. pt reports left arm pain and swelling hx gout. pt denies CP, SOB, nausea/vomiting, numbness/tingling, fever, cough, chills, dizziness, headache, blurred vision, neuro intact. pt a&ox2 at baseline, lung sounds clear, heart rate regular, abdomen soft nontender nondistended to palp. skin intact. IV in right hand 20G and patent. Will continue to monitor and assess while offering support and reassurance.

## 2023-06-19 NOTE — ED PROVIDER NOTE - OBJECTIVE STATEMENT
73-year-old male past medical history A-flutter (on apixaban), HTN, mild dementia, HTN, HLD follicular lymphoma with DLBCL (Tx with R–CHOP in 2003 with relapse in 2009, treated with BR) multiple areas of palpable lymphadenopathy with follicular lymphoma grade IIIA s/p 3 cyles of obinutuzumab which was discontinued due to intolerability, now on second line therapy with tafasitamab plus lenalidomide currently on cycle 3-day 12 of treatment, on PPx acyclovir and Levaquin presents to the ED for low BP today at routine follow-up at [facility] follow up. hypotensive to 86/58  Oncologist: Dr. Jason Coulter

## 2023-06-19 NOTE — H&P ADULT - PROBLEM SELECTOR PLAN 1
- per outpatient records, patient on date of admission had hypotension 86/58, at [facility] and encouraged by Dr. Jason Cordova (oncologist ) to present to ED for further workup  - while here patient has had episodes of confusion/subjective weakness  - however, BP has remained improved 146/75, 118/74, afebrile, VSS  - CBC significant for low WBC count and low neutrophils, iso of chemotherapy  - RVP negative on admission  - UA negative  - Ordered US Kidney and Bladder to r/o urinary retention per Heme/Onc  - F/u Urine cultures  - Can obtain Blood cultures - per outpatient records, patient on date of admission had hypotension 86/58, at [facility] and encouraged by Dr. Jason Cordova (oncologist ) to present to ED for further workup  - while here patient has had episodes of confusion/subjective weakness  - however, BP has remained improved 146/75, 118/74, afebrile, VSS  - CBC significant for low WBC count and low neutrophils, iso of chemotherapy  - RVP negative on admission  - UA negative  - Ordered US Kidney and Bladder to r/o urinary retention per Heme/Onc  - Bladder scan post void showed 347 cc, per patient no symptoms of suprapubic fullness or urinary retention, however will continue with bladder scans   - F/u Urine cultures  - Can obtain Blood cultures - per outpatient records, patient on date of admission had hypotension 86/58, at [facility] and encouraged by Dr. Jason Cordova (oncologist ) to present to ED for further workup  - while here patient has had episodes of confusion/subjective weakness  - however, BP has remained improved 146/75, 118/74, afebrile, VSS  - CBC significant for low WBC count and low neutrophils, iso of chemotherapy  - RVP negative on admission  - UA negative  - Ordered US Kidney and Bladder to r/o urinary retention per Heme/Onc  - US Kidney & Bladder: Revealed mild fullness in left renal collecting system, no hydronephrosis  - Bladder scan post void showed 347 cc, per patient no symptoms of suprapubic fullness or urinary retention, however will continue with bladder scans   - F/u Urine cultures  - Can obtain Blood cultures - per outpatient records, patient on date of admission had hypotension 86/58, at [facility] and encouraged by Dr. Jason Cordova (oncologist ) to present to ED for further workup  - while here patient has had episodes of confusion/subjective weakness  - however, BP has remained improved 146/75, 118/74, afebrile, VSS  - CBC significant for low WBC count and low neutrophils, iso of chemotherapy  - RVP negative on admission  - UA negative  - CXR Unremarkable on admission, no signs of pneumothorax, consolidation, pna, or atelectasis  - Ordered US Kidney and Bladder to r/o urinary retention per Heme/Onc  - US Kidney & Bladder: Revealed mild fullness in left renal collecting system, no hydronephrosis  - Bladder scan post void showed 347 cc, per patient no symptoms of suprapubic fullness or urinary retention, however will continue with bladder scans   - F/u Urine cultures  - Can obtain Blood cultures - per outpatient records, patient on date of admission had hypotension 86/58, at [facility] and encouraged by Dr. Jason Cordova (oncologist ) to present to ED for further workup  - while here patient has had episodes of confusion/subjective weakness  - however, BP has remained improved 146/75, 118/74, afebrile, VSS  - CBC significant for low WBC count and low neutrophils, iso of chemotherapy  - RVP negative on admission  - UA negative  - CXR Unremarkable on admission, no signs of pneumothorax, consolidation, pna, or atelectasis  - Ordered US Kidney and Bladder to r/o urinary retention per Heme/Onc  - US Kidney & Bladder: Revealed mild fullness in left renal collecting system, no hydronephrosis  - Bladder scan post void showed 347 cc, per patient no symptoms of suprapubic fullness or urinary retention, however will continue with bladder scans   - F/u Urine cultures  - Can obtain Blood cultures  - Will continue on home levaquin prophylactically, 500 mg qd  - started IVF, 0.9% NS 75 cc/hr for 10 hours

## 2023-06-19 NOTE — H&P ADULT - NSHPPHYSICALEXAM_GEN_ALL_CORE
Gen: Well appearing resting in NAD, AAO x 1  HEENT: NC/AT, PERRLA, EOMI, MMM  Resp: unlabored CTAB  Cardiac: rrr s1s2, no murmurs, rubs or gallops  GI: ND, +BS, Soft, NT  Ext: no pedal edema, FROM in all extremities. +swelling dorsal radial hand with +ttp in joint of R thumb.   Neuro: no focal deficits, no meningismus. CN II-XII intact.  Skin: No rashes or lesions noted Gen: Well appearing resting in NAD, AAO x 1  HEENT: NC/AT, PERRLA, EOMI, MMM  Resp: Clear to auscultation bilaterally. No rales, rhonchi, or wheezes noted.   Cardiac: rrr s1s2, no murmurs, rubs or gallops  GI: ND, +BS, Soft, NT  Ext: no pedal edema, FROM in all extremities. +swelling dorsal radial hand with +ttp in joint of R thumb.   Neuro: no focal deficits, no meningismus. CN II-XII intact.  Skin: No rashes or lesions noted Gen: Well appearing resting in NAD, AAO x 2 (not to date)  HEENT: NC/AT, PERRLA, EOMI, MMM  Resp: Clear to auscultation bilaterally. No rales, rhonchi, or wheezes noted.   Cardiac: rrr s1s2, no murmurs, rubs or gallops  GI: ND, +BS, Soft, NT  Ext: no cyanosis or edema noted. Right hand wrapped to keep IV in place, with some bruising and erythema noted. mild tenderness to palpation on dorsal hand. Fleshy bump noted on patient's right chest wall, per patient has been there for years, likely in setting of lymphoma.   Neuro: no focal deficits, no meningismus. CN II-XII intact.  Skin: No rashes or lesions noted

## 2023-06-19 NOTE — H&P ADULT - PROBLEM SELECTOR PLAN 6
DVT Prophylaxis: on Apixaban 5 mg bid  Dispo: Home - for agitation, can continue with ativan as needed   - 1:1 - c/w home atorvastatin 40 mg qhs - patient presented with low Mg, repleted   - will recheck BMP in AM

## 2023-06-19 NOTE — ED PROVIDER NOTE - CLINICAL SUMMARY MEDICAL DECISION MAKING FREE TEXT BOX
BRYAN Lockwood MD:  73-year-old male past medical history A-flutter (on apixaban), HTN, mild dementia, HTN, HLD follicular lymphoma with DLBCL (Tx with R–CHOP in 2003 with relapse in 2009, treated with BR) multiple areas of palpable lymphadenopathy with follicular lymphoma grade IIIA s/p 3 cyles of obinutuzumab which was discontinued due to intolerability, now on second line therapy with tafasitamab plus lenalidomide currently on cycle 3-day 12 of treatment presents from cancer center for hypotension. Had blood pressure reading 80's/60's intermittent. Pt with some generalized weakness. Plan: infectious, metabolic and cardiac w/u

## 2023-06-19 NOTE — ED PROVIDER NOTE - PHYSICAL EXAMINATION
Gen: Well appearing, AAO x 1, NAD  Skin: No rashes or lesions  HEENT: NC/AT, PERRLA, EOMI, MMM  Resp: unlabored CTAB  Cardiac: rrr s1s2, no murmurs, rubs or gallops  GI: ND, +BS, Soft, NT  Ext: no pedal edema, FROM in all extremities. +swelling dorsal radial hand with +ttp IP joint R thumb, full AROM.   Neuro: no focal deficits, no meningismus. CNII-XII intact.

## 2023-06-19 NOTE — H&P ADULT - PROBLEM SELECTOR PLAN 8
DVT Prophylaxis: on Apixaban 5 mg bid  Dispo: Home, pending PT Consult - for agitation, can continue with ativan as needed   - 1:1

## 2023-06-19 NOTE — HISTORY OF PRESENT ILLNESS
[Disease:__________________________] : Disease: [unfilled] [Treatment Protocol] : Treatment Protocol [de-identified] : Initial Presentation:\par \par 73 yo with MHx significant for Atrial flutter (on Apixaban), HTN, here for further evaluation of low-level neutrophilia (since 1/2022) and lymphadenopathy. Previously NHL (around [id number]?). He received chemotherapy in 2004. 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 In 2010 he went to [place] and was treated for reoccurrence and was treated with bendamustine. He reports that he has had areas of swelling in his neck on and off for about 2 years which was mostly undergoing workup with his endocrinologist. In the last few months he has noticed increasing size of his left cervical LNs and a right nodule that caused swelling of his face, which has now spontaneously decreased in size significantly.\par \par Today he reports he feels well outside of weight loss. He denies any other constitutional complaints. He weighed 192 lbs in 10/2021 which was down to 183 lbs in December 12/2021(2 months later) which has further decreased down to 179 lbs today. He has not felt himself masses outside of his neck region. On 5/14/22, he went for US duplex of his left lower extremity due to a "tangerine size lump" on the back of his left thigh, which noted a 4.4 cm hypoechoic mass in his left inguinal region without commenting on his perceived posterior thigh mass. Also, on 5/2/22 he underwent US of his thyroid and parathyroid glands where they noticed bilateral cervical lymph nodes with the largest on the left side measuring 2.1 x 1.6 x 1.9 cm and a right level 1 LN measuring 2.2 x 1.2 x 2.3 cm. They saw 3 lymph nodes on the left side and one discrete LN on the right.\par \par  He also recently just finished a 14 day course of Levofloxacin for an uncomplicated phenomena. He was having a dry cough and underwent imaging as an outpatient which found mild left and right pleural effusions, areas of consolidation on the right and retrocardiac airspace involvement (performed 5/2/22). He now feels better without infectious complaints.\par \par In addition, he is currently anemic. He last had a colonoscopy in his recent memory. He had bleeding hemorrhoids last year treated with OTC medications. He denies any tick bites recently. \par \par He also has MGUS with 3 separate monoclonal gammopathies I suspect is related to his NHL. He has M Judd 1 showing IgG Lambda at 0.3 g/dl, M Judd 2 showing IgG Kappa at 0.2 g/dl, M Judd 3 showing IgM Lambda (unable to quantitate). There is no suspicion for myeloma or waldenstroms at this time and his M-spikes will be monitored. He has no elevation in kappa/ lambda FLC ration, but individual light chains are both elevated, kappa at 10.77 mg/dL and lambda at 6.58 mg/dL.\par \par Social Hx\par - He is currently retired. He used to be an .\par - No significant environmental exposure to chemicals\par - Lives by himself and his wife lives in [place].\par - Former smoker. He smoked for 10 years less than 1 pack a day. He quit 20 years ago\par \par Family hx\par - Two brothers non Hodgkins lymphoma. At least one required chemotherapy. sister had cervical cancer first diagnosed 2007 and then 3 years later with more cancer discovered\par - Mother and father passed away from heart disease and diabetes.\par \par =======================================================\par Interval Hx update:\par \par He underwent PET-CT in Aug 2022 which showed FDG avid lymph nodes in the left cervical, bilateral supraclavicular regions, and chest, and a few FDG avid abdominal lymph nodes, intramuscular masses in the left posterior chest and left upper thigh, scattered FDG avid subcutaneous soft tissue/nodules with trace right pleural effusion, moderate left pleural effusion, loculated fluid in the right middle lobe. Moderate abdominal and pelvic ascites. Subcutaneous edema in the lower abdominal wall extending into the imaged bilateral thighs. Splenomegaly with mild FDG avidity, suspicious for lymphomatous involvement.\par \par Prior to the PET-CT, he underwent biopsy of both a cervical lymph node and a left axilla lymph node. 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 There were areas of > 20 SUV on the PET-CT, repeat whole lymph node biopsy was requested to confirm suspected diagnosis of Grade 3B FL or transformed large cell lymphoma. S/p excisional biopsy of back lesion on 9/7/22 which showed recurrent DLBCL.\par \par LP 9/26/22: protein elevated at 61, LDH 27, neutrophils 0, lymphocytes 33, monocytes 67, RBC 5. Oligoclonal bands negative. The flow cytometry failed due to insufficient cells. Recommended continued IT MTX therapy x 4 total cycles. He had an interim PET-CT performed on 2/1/23 (3 months after discontinuation of O-miniCHOP in the middle of his therapy - received 3/6 cycles) which showed possible persistent disease including a small, residual focus of increased FDG activity in the left level II region, likely corresponding to a difficult to delineate lymph node, is decreased in size and metabolism (SUV 3.5; image 33; previous SUV 16.1 and skin thickening and subcutaneous nodules, right lower anterior and lateral abdominal wall, slightly more extensive and similar in metabolism (SUV 7.1; image 170; previous SUV 5.5). Discontinued chemoimmunotherapy with Obi-miniCHOP in Nov 2022 due to treatment complications, hospitalizations, poor PS. He was subsequently started on Tafasitamab (anti-CD19) + lenalidomide since 3/30/23. Lenalidomide dose reduced from 20 mg to 15 mg due to episodes of severe neutropenia.\par \par =======================================================\par Care Providers:\par \par PMD: Dr Amari Hickman\par Endo: Dr Carter Vigil\[facility] Cardiologist: Dr. Don [phone] fax [phone]\par \par =======================================================\par Contacts:\par \par Vonnie (daughter): [phone] - takes all healthcare related calls\par \par ======================================================= [de-identified] : PENDING [de-identified] : Fine Needle Aspiration Report - Auth (Verified)\par Specimen(s) Submitted\par 1. AXILLA, LEFT, US GUIDED CORE BIOPSY AND FNA\par 2. LYMPH NODE, CERVICAL, LEFT POSTERIOR, US GUIDED CORE BIOPSY AND FNA\par \par \par Final Diagnosis\par 1. AXILLA, LEFT, US GUIDED CORE BIOPSY AND FNA\par POSITIVE FOR MALIGNANT CELLS.\par B-cell lymphoma of follicular center origin, most consistent with\par follicular lymphoma, grade 3A.  See note.\par \par Fine Needle Aspiration Addendum Report - Auth (Verified)\par \par Addendum\par 2. LYMPH NODE, CERVICAL, LEFT POSTERIOR, US GUIDED CORE BIOPSY AND FNA\par POSITIVE FOR MALIGNANT CELLS.\par B-cell lymphoma of follicular center origin with high proliferation index.\par See note.\par \par Note:\par The neoplastic B cells demonstrate a follicular center B-cell phenotype with MUM-1 co-expression and a high proliferation index.  Follicular dendritic meshwork is present in most areas of the biopsy, consistent\par with follicular lymphoma.  However, there is one focal area with increased larger cells and lack of follicular dendritic meshwork. Therefore, a high grade component can not be excluded.  If clinically indicated, suggest excisional biopsy for definitive classification.\par \par Immunohistochemical stains   (CD3, CD5, CD10, CD20, CD21, CD23, CD30, BCL-6, BCL-2, cyclinD1, ki-67, c-MYC, PAX-5, MUM-1, p53, ISAURO) were performed on block 2C.  The neoplastic cells are positive for CD20, PAX-5, BCL-6, BCL-2, MUM-1 (partial), dim p53; negative CD5, CD10, CD30, cyclinD1, ISAURO.  CD21 and CD23 highlight follicular dendritic meshwork in most areas with focal absence of follicular dendritic meshwork. \par Ki-67 proliferation index is approximately 60 to 70%.  C-MYC stains approximately 20 to 30% of cells.  CD3 and CD5 highlight some T-cells in the background. [de-identified] : 6/14/22 FNA of Left axilla LN: FLUORESCENCE IN-SITU HYBRIDIZATION (FISH)\par 1. No evidence of MYC Rearrangement\par 2. No evidence of BCL2-IGH [translocation t(14;18)] gene rearrangement\par 3. 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 [FreeTextEntry1] : Tafasitamab  plus Revlimid 20 mg daily 21/28 days. [de-identified] : 5/18/22: Initial Visit.\par \par 6/20/22: Follow-up. Patient feels well overall. Lymphoma labs and left axilla LN biopsy revealed grade 3A follicular lymphoma, IgG Lambda and Kappa MGUS. He reports lymph nodes have been stable. Heart function is stable. He denies having any recent fevers, chills, nausea. No weight changes.\par \par 8/22/22: Follow-up. Patient feels well overall. Awaiting for biopsy results of lymph nodes in his back. He does not have pain in the left back. The left side of his neck gives him discomfort. He has never received chemotherapy nor antibody treatment in the past. No fevers, chills, night sweats. No new noticeable masses  Good appetite, lost 7 lbs (lost a lot of fluid weight due to diuretics). \par \par 10/17/22: Follow up. In the interim, he was hospitalized at [facility] twice (8/27-9/12 & 9/16-10/3). 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 Patient was treated with rasburicase, but developed rasburicase-triggered G6PD hemolysis. Also found to be in acute exacerbation of HFrEF and have a prolonged QTc (likely medication-induced). During hospital stay was found to have altered mental status. 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 In mid-September patient was hospitalized for Encephalopathy (+Rhinovirus/enterovirus) unrelated to the Lymphoma. Today, he feels at baseline. Notes resolution of cervical LNs, and back lesions since starting treatment. Patient denies fever, chills, night sweats, back pain, abdominal pain, chest pain, or shortness of breath. Fair appetite, weight loss due to prolonged hospitalizations.\par \par 11/17/22: Follow-up. On 10/28/22 at home was found down by his daughter found to have fever, STEPHEN and AMS and was admitted for acute metabolic encephalopathy with sepsis 2/2 pneumonia s/p 7 days zosyn with improvement. Today he presents from rehab with his daughter. He is not feeling well. He feels that he is weak and fatigued. He has been doing PT / walking around the facility. He reads when awake. He has a poor appetite, but his family is bringing in food that he likes. No fevers, chills, night sweats. No new lumps or masses.\par \par 2/6/23: Follow-up. He was readmitted [facility] 1/9/23 to 1/20/23 for malaise and cytopenias. In December 2022 he had pneumonia and COVID19 for which there has been a long recovery. Due to these complications, his treatment with chemoimmunotherapy (O-miniCHOP) was discontinued. He is currently staying in a rehab to improve his performance status. He overall feels well today and reports feeling much improved relative to Dec 2022. He eats 3 meals a day, but prefers to eat late at night. His appetite is good and his daughter brings a lot of food supplementation for him. He continues to lose weight however. No other recent illnesses. He had a PET-CT last week.\par \par 3/13/23: Followup. He has not yet rec'd revlimid but is now approved for free drug. Continues to have issues gaining weight, and reports a very good appetite. He has not lost weight at least. He also has been having right foot pain between the ankle and toes since the night of 3/6/23. He also has a rash on toes 1-3. He continues to have right lateral abdominal itchiness around a site of swelling / lump. This has not changed in the past month. He is now back home. He is able to ambulate with a walker. He is not limited by dyspnea. He had edema in the rehab which has resolved. Per family he has also been having intermittent periods where he is not himself and he becomes aggressive toward family members. This has been an ongoing issue for sometime \par \par 3/30/23: Follow-up. Today is Cycle 1 Day 1 of Tafa. He has not yet rec'd revlimid (expected delivery today); is approved for free drug via [employer]. Reports intentional weight gain over the past couple of weeks and ambulating with a walker. Continues to have right foot pain between the ankle and toes since the night of 3/6/23, and have right lateral abdominal itchiness around a site of swelling / lump. This has not changed in the past month. He is now back home. Per family he has intermittent periods where he is not himself and he becomes aggressive toward family members. \par \par 4/6/23: Follow-up. Today is Cycle 1 Day 8 of Tafasitamab. Did receive revlimid on day 1 and has been tolerating Revlimid well. Denies any abdominal issues, continues to have a good appetite. Ambulating with a walker but continues to have right foot pain, and have right lateral abdominal itchiness around a site of swelling / lump. Per family he has intermittent periods where he is not himself and he becomes aggressive toward family members. \par \par 4/13/23: Follow-up. Today is Cycle 1 Day 15 of Tafasitamab. He is tolerating the regimen well. Denies any abdominal issues, continues to have a good appetite. Ambulating with a walker but continues to have right foot pain, and have right lateral abdominal itchiness around a site of swelling / lump. Per family he has intermittent periods where he is not himself and he becomes aggressive toward family members. \par \par 4/20/23: Follow-up. Today is Cycle 1 Day 22. Today he reports feeling well, but has noticed his right leg / foot is swollen and associated with shoe tightness. He reports that he has been taking his apixaban which he takes for cardiac reasons. His ANC is 0, but denies any mucositis, or other infectious issues. No new lumps or masses. His right abd mass is decreasing in size.\par \par 4/27/23: Follow-up. Today is Cycle 1 Day 29. After receiving zarxio he developed a facial rash which is now self resolved. He remains off of Revlimid due to neutropenia. Has the mediport insertion scheduled for May 9th. Today he reports feeling well, his right leg / foot remains stable and swollen; associated with shoe tightness. US Duplex (4/20/23) negative for DVT. He reports that he has been taking his apixaban which he takes for cardiac reasons. Denies any mucositis, or other infectious issues. No new lumps or masses. His right abd mass is decreasing in size.\par \par 5/4/23: Follow-up. Today is Cycle 1 Day 36; has been unable to receive Tafa due to peripheral access limitation therefore cycle 2 has not been counted. He has restarted Revlimid at a lowered dose of 15mg due to neutropenia, tolerating well without neutropenia thus far. He has the mediport insertion scheduled for May 9th. Today he reports feeling well, his right leg / foot remains stable and swollen;US Duplex (4/20/23) negative for DVT. Remains on apixaban which he takes for cardiac reasons. Denies any mucositis, or other infectious issues. No new lumps or masses. His right abd mass is no longer palpable.\par \par 5/25/23: Follow-up. Today is Cycle 2 Day 15. He has a mediport in place now and is able to receive the Tafasitamab without issue, tolerating Revlimid well at a lowered dose of 15mg due to neutropenia. Today he reports feeling well, his intermittent right leg swelling is much improved. Has been hydrating well recently, given elevated BUN. Remains on apixaban which he takes for cardiac reasons. Denies any bleeding, mucositis, masses/lumps, fever, chills, or night sweats. Good appetite.\par \par 6/19/23: Follow-up. Today is Cycle 3 Day 12 of Tafa + Revlimid. Due to neutropenia about 2 weeks ago he was again advised to hold the Revlimid until further notice. He has been having swelling in his hands, left > right with significant pain (gout-like) in the left thumb. He developed a sore throat yesterday at home and was using honey to help soothe. Today he only has a sore throat when swallowing liquids. He was aslo having chills, confused and having visual hallucinations.  He denies chest pain or shortness of breath. He ambulated into the center today, per daughter he has been walking much slower. He denies any issues with bowel or urinary function. He reports he is hydrating a couple times a day per daughter up to 500 mL a day. He is still off revlimid. Blood pressure rechecked 86/58.\par \par A comprehensive review of systems was performed including constitutional, eyes, ENT, cardiovascular, respiratory, gastrointestinal, genitourinary, musculoskeletal, integumentary, neurological, psychiatric and hematologic / lymphatic. All pertinent positives are included in the H&P under interval history above and the remaining review of systems listed are negative. \par \par ====================================================\par Treatment Hx:\par \par Obinutuzumab-mini-COEP q21 days x 3 cycles (incomplete due to toxicities and patient preference to switch to more tolerable regimen)\par (Obinutuzumab modified mini-COEP: 400 mg/m² cyclophosphamide, Etoposide (40% dose reduced to 30 mg/m2 IV on day 1 and 60 mg/m2 PO on days 2 and 3 of each cycle), and 2 mg vincristine (flat dose) on day 1 of each cycle, and 100 mg prednisone on days 1–5. Modified from Man et al 2009 Blood "R-CHOP with Etoposide Substituted for Doxorubicin (R-CEOP): Excellent Outcome in Diffuse Large B Cell Lymphoma for Patients with a Contraindication to Anthracyclines." with mini- dosing for elderly patients)\par - Cycle 1 Day 1 given 9/2/22 - third dose of Obinutuzumab held due to AMS, requiring admission to [facility] found to have enterovirus infection\par - Cycle 2 Day 1 given 9/30/22 - Given as an inpatient at [facility]\par - Cycle 3 Day 1 given 10/21/22 - Complicated by pneumonia, requiring admission and rehab placement\par \par Tafasitamab plus Revlimid (changed therapy due to patient and family's wishes due to intolerable toxicities), On 28 day cycles.\par - Cycle 1 Day 1 on 3/30/23 (HELD revlimid briefly starting 4/20/23 due to neutropenia)\par - Cycle 2 Day 1 on 5/11/23\par - Cycle 3 Day 1 on 6/8/23\par \par ====================================================

## 2023-06-19 NOTE — H&P ADULT - PROBLEM SELECTOR PLAN 2
- on Cycle 3, Day 12 of lenalidomide & tafasitamib  - will continue to be monitored by hem/onc while inpatient here daily  - per Dr. Cordova (outpatient oncologist) patient should continue Tafasitamab 12 mg/kg every 2 weeks once cleared of an acute process  - HOLD Revlimid until neutrophil count recovers, then can resume with dose changed to 10 mg x21 days and CBC with every treatment visit to monitor for neutropenia  - Patient should be continued on Apixaban for VTE ppx and hx of Atrial flutter  -Continue with Acyclovir 400 mg bid and allopurinol prophylactically - on Cycle 3, Day 12 of lenalidomide & tafasitamib  - will continue to be monitored by hem/onc while inpatient here daily  - per Dr. Cordova (outpatient oncologist) patient should continue Tafasitamab 12 mg/kg every 2 weeks once cleared of an acute process  - HOLD Revlimid until neutrophil count recovers, then can resume with dose changed to 10 mg x21 days and CBC with every treatment visit to monitor for neutropenia  - Patient should be continued on Apixaban for VTE ppx and hx of Atrial flutter  -Continue with Acyclovir 400 mg bid and allopurinol prophylactically, as well as levaquin 500 mg qd prophylaxis

## 2023-06-19 NOTE — H&P ADULT - PROBLEM SELECTOR PLAN 3
- c/w home apixaban 5 mg bid   - admit to telemetry - presenting with elevated creatinine 3.16 compared to baseline creatinine 2.00, eGFR 20  - US Kidney & Bladder unremarkable outside of mild fullness in left renal collecting system. Negative for hydronephrosis or renal masses.  - Bladder scan post void on admission showed 347 cc, mild retention  - pt asymptomatic however, with no complaints of suprapubic fulness, change in urinary frequency or dysuria/hematuria   - Patient also found to have incidental cholelithiasis without evidence of acute cholecystitis on US of Kidney & Bladder  - Lenoflamide (patient's outpatient chemotherapy) can be associated with STEPHEN  - keep off nephrotoxins if possible while inpatient  - trend CMP daily

## 2023-06-19 NOTE — H&P ADULT - HISTORY OF PRESENT ILLNESS
73-year-old male with a past medical history of atrial flutter (on apixaban), HTN, mild dementia, HLD, follicular lymphoma with DLBCL (Tx with R–CHOP in 2003 with relapse in 2009, treated with BR) multiple areas of palpable lymphadenopathy with follicular lymphoma grade IIIA s/p 3 cyles of obinutuzumab which was discontinued due to intolerability, now on second line therapy with tafasitamab plus lenalidomide currently on cycle 3-day 12 of treatment, on PPx acyclovir and Levaquin, who presents to the ED for low BP today at routine follow-up at [facility], Patient was hypotensive to 86/58 (80s/60s). Patient follows with Oncologist: Dr. Jason Cordova at [facility].     ED Course: Patient agitated and threatening to punch staff and given 2 mg Ativan and placed on 1:1 observation.  73-year-old male with a past medical history of atrial flutter (on apixaban), HTN, mild dementia, HLD, follicular lymphoma with DLBCL (Tx with R–CHOP in 2003 with relapse in 2009, treated with BR) multiple areas of palpable lymphadenopathy with follicular lymphoma grade IIIA s/p 3 cyles of obinutuzumab which was discontinued due to intolerability, now on second line therapy with tafasitamab plus lenalidomide currently on cycle 3-day 12 of treatment, on PPx acyclovir and Levaquin, who presents to the ED for low BP today at routine follow-up at [facility], Patient was hypotensive to 86/58 (80s/60s). Patient follows with Oncologist: Dr. Jason Cordova at [facility].     At his outpatient appointment toady with Dr. Cordova, he was found to have chills and sore throat, per daughter, and confused with visual hallucinations, sent to ED for further workup. Patient recently completed a course of levaquin for uncomplicated pneumonia. Per Dr. Cordova's note on [employer], patient has had longstanding suspected dementia >7 monghts of aggressive and confusion episodes. No evidence of CNS lymphoma involvement at present. Patient now on 2nd line therapy with Tafasitamab (Anti-cd19) and Lenalidomide (15 mg 21/28 days) since 3/30/23, today is Cycle 3 Day 12 of Tafasitamab and lenalidomide. For now patient should hold Revlimid per Dr. Cordova given neutropenia on outpatient record, once neutrophils recover will change dose to 10 mg x21 days with CBC monitoring every treatment visit. Patient should also continue on his acyclovir 400 mg bid for antiviral ppx per Onc. Will continue to be seen by Onc while inpatient here.     ED Course: Patient agitated and threatening to punch staff and given 2 mg Ativan and placed on 1:1 observation.  73-year-old male with a past medical history of atrial flutter (on apixaban), HTN, mild dementia, HLD, follicular lymphoma with DLBCL (Tx with R–CHOP in 2003 with relapse in 2009, treated with BR) multiple areas of palpable lymphadenopathy with follicular lymphoma grade IIIA s/p 3 cyles of obinutuzumab which was discontinued due to intolerability, now on second line therapy with tafasitamab plus lenalidomide currently on cycle 3-day 12 of treatment, on PPx acyclovir and Levaquin, who presents to the ED for low BP today at routine follow-up at [facility], Patient was hypotensive to 86/58 (80s/60s). Patient follows with Oncologist: Dr. Jason Cordova at [facility].     At his outpatient appointment toady with Dr. Cordova, he was found to have chills and sore throat, per daughter, and confused with visual hallucinations, sent to ED for further workup. Patient recently completed a course of levaquin for uncomplicated pneumonia. Per Dr. Cordova's note on [employer], patient has had longstanding suspected dementia >7 monghts of aggressive and confusion episodes. No evidence of CNS lymphoma involvement at present. Patient now on 2nd line therapy with Tafasitamab (Anti-cd19) and Lenalidomide (15 mg 21/28 days) since 3/30/23, today is Cycle 3 Day 12 of Tafasitamab and lenalidomide. For now patient should hold Revlimid per Dr. Cordova given neutropenia on outpatient record, once neutrophils recover will change dose to 10 mg x21 days with CBC monitoring every treatment visit. Patient should also continue on his acyclovir 400 mg bid for antiviral ppx per Onc. Will continue to be seen by Onc while inpatient here.     ED Course: Patient agitated and threatening to punch staff and given 2 mg Ativan and placed on 1:1 observation. Now on reexamination 1 hour later patient appears much more calm and collected, AAOx2 and pleasant, conversational. Patient is in no acute distress with no complaints other than feeling mildly chilly. He denied sore throat, cough, chest pain, SOB. 73-year-old male with a past medical history of atrial flutter (on apixaban), HTN, mild dementia, HLD, follicular lymphoma with DLBCL (Tx with R–CHOP in 2003 with relapse in 2009, treated with BR) multiple areas of palpable lymphadenopathy with follicular lymphoma grade IIIA s/p 3 cyles of obinutuzumab which was discontinued due to intolerability, now on second line therapy with tafasitamab plus lenalidomide currently on cycle 3-day 12 of treatment, on PPx acyclovir and Levaquin, who presents to the ED for low BP today at routine follow-up at [facility], Patient was hypotensive to 86/58 (80s/60s). Patient follows with Oncologist: Dr. Jason Cordova at [facility].     At his outpatient appointment toady with Dr. Cordova, he was found to have chills and sore throat, per daughter, and confused with visual hallucinations, sent to ED for further workup. Patient recently completed a course of levaquin for uncomplicated pneumonia. Per Dr. Cordova's note on [employer], patient has had longstanding suspected dementia >7 monghts of aggressive and confusion episodes. No evidence of CNS lymphoma involvement at present. Patient now on 2nd line therapy with Tafasitamab (Anti-cd19) and Lenalidomide (15 mg 21/28 days) since 3/30/23, today is Cycle 3 Day 12 of Tafasitamab and lenalidomide. For now patient should hold Revlimid per Dr. Cordova given neutropenia on outpatient record, once neutrophils recover will change dose to 10 mg x21 days with CBC monitoring every treatment visit. Patient should also continue on his acyclovir 400 mg bid for antiviral ppx per Onc. Will continue to be seen by Onc while inpatient here.     ED Course: Patient agitated and threatening to punch staff and given 2 mg Ativan and placed on 1:1 observation. Now on reexamination 1 hour later patient appears much more calm and collected, AAOx2 and pleasant, conversational. Patient is in no acute distress with no complaints other than feeling mildly chilly. He denied sore throat, cough, chest pain, SOB. Patient also did endorse that he has episodes of confusion at home at baseline especially at night from time to time. Currently denied any anxiety or confusion or hallucinations auditory or visual with me. 73-year-old male with a past medical history of atrial flutter (on apixaban), HTN, mild dementia, HLD, follicular lymphoma with DLBCL (Tx with R–CHOP in 2003 with relapse in 2009, treated with BR) multiple areas of palpable lymphadenopathy with follicular lymphoma grade IIIA s/p 3 cyles of obinutuzumab which was discontinued due to intolerability, now on second line therapy with tafasitamab plus lenalidomide currently on cycle 3-day 12 of treatment, on PPx acyclovir and Levaquin, who presents to the ED for low BP today at routine follow-up at [facility], Patient was hypotensive to 86/58 (80s/60s). Patient follows with Oncologist: Dr. Jason Cordova at [facility].     At his outpatient appointment toady with Dr. Cordova, he was found to have new low BP (86/58) so was recommended to go to ED for further workup. Patient recently completed a course of levaquin for uncomplicated pneumonia. Per Dr. Cordova's note on [employer], patient has had longstanding suspected dementia >7 monghts of aggressive and confusion episodes. No evidence of CNS lymphoma involvement at present. Patient now on 2nd line therapy with Tafasitamab (Anti-cd19) and Lenalidomide (15 mg 21/28 days) since 3/30/23, today is Cycle 3 Day 12 of Tafasitamab and lenalidomide. For now patient should hold Revlimid per Dr. Cordova given neutropenia on outpatient record, once neutrophils recover will change dose to 10 mg x21 days with CBC monitoring every treatment visit. Patient should also continue on his acyclovir 400 mg bid for antiviral ppx per Onc. Will continue to be seen by Onc while inpatient here.     ED Course: Patient agitated and threatening to punch staff and given 2 mg Ativan and placed on 1:1 observation. Now on reexamination 1 hour later patient appears much more calm and collected, AAOx2 and pleasant, conversational. Patient is in no acute distress with no complaints other than feeling mildly chilly. He denied sore throat, cough, chest pain, SOB. Patient also did endorse that he has episodes of confusion at home at baseline especially at night from time to time. Currently denied any anxiety or confusion or hallucinations auditory or visual with me. 73-year-old male with a past medical history of atrial flutter (on apixaban), HTN, mild dementia, HLD, follicular lymphoma with DLBCL (Tx with R–CHOP in 2003 with relapse in 2009, treated with BR) multiple areas of palpable lymphadenopathy with follicular lymphoma grade IIIA s/p 3 cyles of obinutuzumab which was discontinued due to intolerability, now on second line therapy with tafasitamab plus lenalidomide currently on cycle 3-day 12 of treatment, on PPx acyclovir and Levaquin, who presents to the ED for low BP today at routine follow-up at [facility], Patient was hypotensive to 86/58 (80s/60s). Patient follows with Oncologist: Dr. Jason Cordova at [facility].     At his outpatient appointment toady with Dr. Cordova, he was found to have new low BP (86/58) so was recommended to go to ED for further workup. As per Dr. Cordova's note on [employer], patient has had longstanding suspected dementia >7 mon of aggressive and confusion episodes. No evidence of CNS lymphoma involvement at present. Patient now on 2nd line therapy with Tafasitamab (Anti-cd19) and Lenalidomide (15 mg 21/28 days) since 3/30/23, today is Cycle 3 Day 12 of Tafasitamab and lenalidomide. For now patient should hold Revlimid per Dr. Cordova given neutropenia on outpatient record, once neutrophils recover will change dose to 10 mg x21 days with CBC monitoring every treatment visit. Patient should also continue on his acyclovir 400 mg bid for antiviral ppx per Onc. Will continue to be seen by Onc while inpatient here.     ED Course: Patient agitated and threatening to punch staff and given 2 mg Ativan and placed on 1:1 observation. Now on reexamination 1 hour later patient appears much more calm and collected, AAOx2 and pleasant, conversational. Patient is in no acute distress with no complaints other than feeling mildly chilly. He denied sore throat, cough, chest pain, SOB. Patient also did endorse that he has episodes of confusion at home at baseline especially at night from time to time. Currently denied any anxiety or confusion or hallucinations auditory or visual with me. 73-year-old male with a past medical history of atrial flutter (on apixaban), HTN, mild dementia, HLD, follicular lymphoma with DLBCL (Tx with R–CHOP in 2003 with relapse in 2009, treated with BR), grade IIIA s/p 3 cyles of obinutuzumab which was discontinued due to intolerability, now on second line therapy with tafasitamab plus lenalidomide currently on cycle 3-day 12 of treatment, on PPx acyclovir and Levaquin, who presents to the ED for low BP today at routine follow-up at [facility], Patient was hypotensive to 86/58 (80s/60s). Patient follows with Oncologist: Dr. Jason Cordova at [facility].     At his outpatient appointment toady with Dr. Cordova, he was found to have new low BP (86/58) so was recommended to go to ED for further workup. As per Dr. Cordova's note on [employer]Scripts, patient has had longstanding suspected dementia >7 mon of aggressive and confusion episodes. No evidence of CNS lymphoma involvement at present. Patient now on 2nd line therapy with Tafasitamab (Anti-cd19) and Lenalidomide (15 mg 21/28 days) since 3/30/23, today is Cycle 3 Day 12 of Tafasitamab and lenalidomide. For now patient should hold Revlimid per Dr. Cordova given neutropenia on outpatient record, once neutrophils recover will change dose to 10 mg x21 days with CBC monitoring every treatment visit. Patient should also continue on his acyclovir 400 mg bid for antiviral ppx per Onc. Will continue to be seen by Onc while inpatient here.     ED Course: Patient agitated and threatening to punch staff and given 2 mg Ativan and placed on 1:1 observation. Now on reexamination 1 hour later patient appears much more calm and collected, AAOx2 and pleasant, conversational. Patient is in no acute distress with no complaints other than feeling mildly chilly. He denied sore throat, cough, chest pain, SOB. Patient also did endorse that he has episodes of confusion at home at baseline especially at night from time to time. Currently denied any anxiety or confusion or hallucinations auditory or visual with me. 73-year-old male with a past medical history of atrial flutter (on apixaban), HTN, mild dementia, HLD, follicular lymphoma with DLBCL (Tx with R–CHOP in 2003 with relapse in 2009, treated with BR), grade IIIA s/p 3 cyles of obinutuzumab which was discontinued due to intolerability, now on second line therapy with tafasitamab plus lenalidomide currently on cycle 3-day 12 of treatment, on PPx acyclovir and Levaquin, who presents to the ED for low BP today at routine follow-up at [facility], Patient was hypotensive to 86/58 (80s/60s). Patient follows with Oncologist: Dr. Jason Cordova at [facility].     At his outpatient appointment toady with Dr. Cordova, he was found to have new low BP (86/58) so was recommended to go to ED for further workup. As per Dr. Cordova's note on [employer]s, patient has had longstanding suspected dementia >7 mon of aggressive and confusion episodes. No evidence of CNS lymphoma involvement at present. Patient now on 2nd line therapy with Tafasitamab (Anti-cd19) and Lenalidomide  since 3/30/23, today is Cycle 3 Day 12 of Tafasitamab and lenalidomide. For now patient should hold Revlimid per Dr. Cordova given neutropenia on outpatient record, once neutrophils recover will change dose to 10 mg x21 days with CBC monitoring every treatment visit. Patient should also continue on his acyclovir 400 mg bid for antiviral ppx per Onc. Will continue to be seen by Onc while inpatient here.     ED Course: Patient agitated and threatening to punch staff and given 2 mg Ativan and placed on 1:1 observation. Now on reexamination 1 hour later patient appears much more calm and collected, AAOx2 and pleasant, conversational. Patient is in no acute distress with no complaints other than feeling mildly chilly. He denied sore throat, cough, chest pain, SOB. Patient also did endorse that he has episodes of confusion at home at baseline especially at night from time to time. Currently denied any anxiety or confusion or hallucinations auditory or visual with me. 73-year-old male with a past medical history of atrial flutter (on apixaban), HTN, mild dementia, HLD, follicular lymphoma with DLBCL (Tx with R–CHOP in 2003 with relapse in 2009, treated with BR), grade IIIA s/p 3 cyles of obinutuzumab which was discontinued due to intolerability, now on second line therapy with tafasitamab plus lenalidomide currently on cycle 3-day 12 of treatment, on PPx acyclovir and Levaquin, who presents to the ED for low BP today at routine follow-up at [email or web address] Patient was hypotensive to 86/58 (80s/60s). Patient follows with Oncologist: Dr. Jason Cordova at [facility].     At his outpatient appointment on date of admission with Dr. Cordova, he was found to have new low BP (86/58) so was recommended to go to ED for further workup. As per Dr. Cordova's note on [employer]Scripts, patient has had longstanding suspected dementia >7 mon of aggressive and confusion episodes. No evidence of CNS lymphoma involvement at present. Patient now on 2nd line therapy with Tafasitamab (Anti-cd19) and Lenalidomide  since 3/30/23, today is Cycle 3 Day 12 of Tafasitamab and lenalidomide. For now patient should hold Revlimid per Dr. Cordova given neutropenia on outpatient record, once neutrophils recover will change dose to 10 mg x21 days with CBC monitoring every treatment visit. Patient should also continue on his acyclovir 400 mg bid for antiviral ppx per Onc. Will continue to be seen by Onc while inpatient here.     ED Course: Patient agitated and threatening to punch staff and given 2 mg Ativan and placed on 1:1 observation. Now on reexamination 1 hour later patient appears much more calm and collected, AAOx2 and pleasant, conversational. Patient is in no acute distress with no complaints other than feeling mildly chilly. He denied sore throat, cough, chest pain, SOB. Patient also did endorse that he has episodes of confusion at home at baseline especially at night from time to time. Currently denied any anxiety or confusion or hallucinations auditory or visual with me. 73-year-old male with a past medical history of atrial flutter (on apixaban), HTN, mild dementia, HLD, follicular lymphoma with DLBCL (Tx with R–CHOP in 2003 with relapse in 2009, treated with BR), grade IIIA s/p 3 cyles of obinutuzumab which was discontinued due to intolerability, now on second line therapy with tafasitamab plus lenalidomide currently on cycle 3-day 12 of treatment, on PPx acyclovir and Levaquin, who presents to the ED for low BP today at routine follow-up at [facility], Patient was hypotensive to 86/58 (80s/60s). Patient follows with Oncologist: Dr. Jason Cordova at [facility].     At his outpatient appointment on date of admission with Dr. Cordova, he was found to have new low BP (86/58) so was recommended to go to ED for further workup. As per Dr. Cordova's note on [employer]Scripts, patient has had longstanding suspected dementia >7 mon of aggressive and confusion episodes. No evidence of CNS lymphoma involvement at present. Patient now on 2nd line therapy with Tafasitamab (Anti-cd19) and Lenalidomide  since 3/30/23, today is Cycle 3 Day 12 of Tafasitamab and lenalidomide. Per AllScripts note daughter was endorsing patient had some sensation of subjective chills and sore throat, however patient denied this to health care provider and myself on admission.    ED Course: Patient was agitated and threatening to punch staff and given 2 mg Ativan and placed on 1:1 observation initially. Now on reexamination 1 hour later patient appears much more calm and collected, AAOx2 and pleasant, conversational. Patient is in no acute distress with no complaints other than feeling mildly chilly. He denied sore throat, cough, chest pain, SOB. Patient also did endorse that he has episodes of confusion at home at baseline especially at night from time to time. Currently denied any anxiety or confusion or hallucinations auditory or visual with me. 73-year-old male with a past medical history of atrial flutter (on apixaban), HTN, mild dementia, HLD, follicular lymphoma with DLBCL (Tx with R–CHOP in 2003 with relapse in 2009, treated with BR), grade IIIA s/p 3 cyles of obinutuzumab which was discontinued due to intolerability, now on second line therapy with tafasitamab plus lenalidomide currently on cycle 3-day 12 of treatment, on PPx acyclovir and Levaquin, who presents to the ED for low BP today at routine follow-up at [facility], Patient was hypotensive to 86/58 (80s/60s). Patient follows with Oncologist: Dr. Jason Cordova at [facility].     At his outpatient appointment on date of admission with Dr. Cordova, he was found to have new low BP (86/58) so was recommended to go to ED for further workup. As per Dr. Cordova's note on [employer]Scripts, patient has had longstanding suspected dementia >7 mon of aggressive and confusion episodes. No evidence of CNS lymphoma involvement at present. Patient now on 2nd line therapy with Tafasitamab (Anti-cd19) and Lenalidomide  since 3/30/23, today is Cycle 3 Day 12 of Tafasitamab and lenalidomide. Per AllScripts note daughter was endorsing patient had some sensation of subjective chills and sore throat, however patient denied this to health care provider and myself on admission.    ED Course: Patient was agitated and threatening to punch staff and given 2 mg Ativan and placed on 1:1 observation initially. Now on reexamination 1 hour later patient appears much more calm and collected, AAOx2 and pleasant, conversational. Patient is in no acute distress with no complaints other than feeling mildly chilly. He denied sore throat, cough, chest pain, SOB. Patient also did endorse that he has episodes of confusion at home at baseline especially at night from time to time. Currently denied any anxiety or confusion or hallucinations auditory or visual with me.    Patient also had some left hand pain and swelling so Xray was obtained 3 views of left hand, which did not show any sign of fracture or dislocation.

## 2023-06-19 NOTE — ED ADULT NURSE REASSESSMENT NOTE - NS ED NURSE REASSESS COMMENT FT1
pt agitated, uncooperative, verbally assaulting staff, stating "I'm going to punch everyone, I am going to punch you, I'm going to rip my IV out and leave". deescalation techniques performed, MD foy, RN, MD, EDT at bedside. 2mg of ativan administered as per MD order for agitation while pt is on cardiac monitor. IV wrapped and secure, 1:1 initiated as per MD order. Will continue to monitor and assess while offering support and reassurance.

## 2023-06-19 NOTE — H&P ADULT - ATTENDING COMMENTS
73y F pmh atrial flutter (on apixaban), HTN, mild dementia, HLD, follicular lymphoma with DLBCL (Tx with R–CHOP in 2003 with relapse in 2009, treated with BR), grade IIIA s/p 3 cycles of obinutuzumab (d/cd for intolerability), now on second line therapy with tafasitamab plus lenalidomide currently on cycle 3-day 12 of treatment, presents to the ED for low BP today at routine follow-up at [facility], course c/b possible progression of dementia     # Hypotension   -Sent from [facility] for episode of hypotension (BP 80s/50s)  -Since presenting to ED BP has been wnl  -S/p 1L NS bolus   - Clinically nontoxic, afebrile, VSS   - Pt immunocompromised, on chemo & currently w/leukopenia high risk of decompensation   - CXR clear, RVP/COVID neg  - UA: noninfectious   - F/u Ucx. Bcx  - Trend labs, fever curve, monitor clinically  - C/w home med acyclovir & Levaquin for Ppx       #STEPHEN:  - Cr 3.16 (bL ~ 1-2)  - Suspect Pre-renal azotemia from hypovolemia + ?Med SE from chemo meds    - IVF, trend labs, avoid nephrotoxin      #Left Hand Pain  -XR left hand: neg for fx or dislocation   -Prn pain control     #HypoMagnesium   - Mg 1.4  - Trend and replete as appropriate     #Dementia  - Baseline dementia w/ periods of increasing agitation and behavior   - Had episode of increased agitation requiring Ativan in ED, now much improved   - On 1:1 observation, will continue and re-eval in AM   - Frequent re-orientation   - Prn Ativan       Rest per resident

## 2023-06-19 NOTE — H&P ADULT - ASSESSMENT
This is a 72 y old male with a PMHX of follicular lymphoma, s/p R-CHOP in 2003, relapsed 2009, treated with BR, s/p 3 cycles of Obinutuzumab, d/c due to intolerability, now on Tafasitamab & Lenalidomide (Cycle 3, Day 12), on PPx Acyclovir and Levaquin, who presented after outpatient oncologist found patient to be hypotensive to 86/58 admitted to medicine for further mgmt and treatment.

## 2023-06-19 NOTE — H&P ADULT - PROBLEM SELECTOR PLAN 5
- for agitation, can continue with ativan as needed   - 1:1 - c/w home atorvastatin 40 mg qhs - patient endorsed some left hand pain/swelling on outpatient  - had Xray of left hand performed on date of admission, 6/20, negative for any fracture, or dislocation  - Advanced first CMC joint arthrosis noted, vascular calcifications noted

## 2023-06-19 NOTE — H&P ADULT - NSICDXPASTMEDICALHX_GEN_ALL_CORE_FT
PAST MEDICAL HISTORY:  Atrial flutter, unspecified type     DM type 2 (diabetes mellitus, type 2) Never on insulin    Essential hypertension     Impaired memory     Moderate mitral regurgitation     Non-Hodgkins Lymphoma In [facility] for > 10 years now with relapse    Pleural effusion     Rhinitis, allergic     Systolic heart failure

## 2023-06-19 NOTE — PHYSICAL EXAM
[Ambulatory and capable of all self care but unable to carry out any work activities] : Status 2- Ambulatory and capable of all self care but unable to carry out any work activities. Up and about more than 50% of waking hours [Thin] : thin [Normal] : affect appropriate [de-identified] : Ambulating [de-identified] : +PPM [de-identified] : RLE trace edema

## 2023-06-20 ENCOUNTER — APPOINTMENT (OUTPATIENT)
Dept: GERIATRICS | Facility: CLINIC | Age: 73
End: 2023-06-20

## 2023-06-20 DIAGNOSIS — M79.642 PAIN IN LEFT HAND: ICD-10-CM

## 2023-06-20 DIAGNOSIS — E83.42 HYPOMAGNESEMIA: ICD-10-CM

## 2023-06-20 DIAGNOSIS — N17.9 ACUTE KIDNEY FAILURE, UNSPECIFIED: ICD-10-CM

## 2023-06-20 LAB
ANION GAP SERPL CALC-SCNC: 16 MMOL/L — SIGNIFICANT CHANGE UP (ref 5–17)
BUN SERPL-MCNC: 54 MG/DL — HIGH (ref 7–23)
CALCIUM SERPL-MCNC: 8.6 MG/DL — SIGNIFICANT CHANGE UP (ref 8.4–10.5)
CHLORIDE SERPL-SCNC: 103 MMOL/L — SIGNIFICANT CHANGE UP (ref 96–108)
CO2 SERPL-SCNC: 24 MMOL/L — SIGNIFICANT CHANGE UP (ref 22–31)
CREAT SERPL-MCNC: 2.15 MG/DL — HIGH (ref 0.5–1.3)
CULTURE RESULTS: SIGNIFICANT CHANGE UP
EGFR: 32 ML/MIN/1.73M2 — LOW
GLUCOSE SERPL-MCNC: 82 MG/DL — SIGNIFICANT CHANGE UP (ref 70–99)
POTASSIUM SERPL-MCNC: 3.6 MMOL/L — SIGNIFICANT CHANGE UP (ref 3.5–5.3)
POTASSIUM SERPL-SCNC: 3.6 MMOL/L — SIGNIFICANT CHANGE UP (ref 3.5–5.3)
SODIUM SERPL-SCNC: 143 MMOL/L — SIGNIFICANT CHANGE UP (ref 135–145)
SPECIMEN SOURCE: SIGNIFICANT CHANGE UP

## 2023-06-20 PROCEDURE — 99223 1ST HOSP IP/OBS HIGH 75: CPT

## 2023-06-20 PROCEDURE — 99233 SBSQ HOSP IP/OBS HIGH 50: CPT | Mod: GC

## 2023-06-20 PROCEDURE — 99232 SBSQ HOSP IP/OBS MODERATE 35: CPT

## 2023-06-20 RX ORDER — ALLOPURINOL 300 MG
100 TABLET ORAL DAILY
Refills: 0 | Status: DISCONTINUED | OUTPATIENT
Start: 2023-06-20 | End: 2023-06-24

## 2023-06-20 RX ORDER — ONDANSETRON 8 MG/1
4 TABLET, FILM COATED ORAL EVERY 8 HOURS
Refills: 0 | Status: DISCONTINUED | OUTPATIENT
Start: 2023-06-20 | End: 2023-06-24

## 2023-06-20 RX ORDER — LANOLIN ALCOHOL/MO/W.PET/CERES
3 CREAM (GRAM) TOPICAL AT BEDTIME
Refills: 0 | Status: DISCONTINUED | OUTPATIENT
Start: 2023-06-20 | End: 2023-06-24

## 2023-06-20 RX ORDER — ATORVASTATIN CALCIUM 80 MG/1
40 TABLET, FILM COATED ORAL AT BEDTIME
Refills: 0 | Status: DISCONTINUED | OUTPATIENT
Start: 2023-06-20 | End: 2023-06-24

## 2023-06-20 RX ORDER — LEVOFLOXACIN 5 MG/ML
500 INJECTION, SOLUTION INTRAVENOUS EVERY 24 HOURS
Refills: 0 | Status: DISCONTINUED | OUTPATIENT
Start: 2023-06-20 | End: 2023-06-20

## 2023-06-20 RX ORDER — APIXABAN 2.5 MG/1
5 TABLET, FILM COATED ORAL EVERY 12 HOURS
Refills: 0 | Status: DISCONTINUED | OUTPATIENT
Start: 2023-06-20 | End: 2023-06-24

## 2023-06-20 RX ORDER — ACYCLOVIR SODIUM 500 MG
400 VIAL (EA) INTRAVENOUS
Refills: 0 | Status: DISCONTINUED | OUTPATIENT
Start: 2023-06-20 | End: 2023-06-24

## 2023-06-20 RX ORDER — FILGRASTIM 480MCG/1.6
480 VIAL (ML) INJECTION ONCE
Refills: 0 | Status: COMPLETED | OUTPATIENT
Start: 2023-06-20 | End: 2023-06-20

## 2023-06-20 RX ORDER — LEVOFLOXACIN 5 MG/ML
500 INJECTION, SOLUTION INTRAVENOUS ONCE
Refills: 0 | Status: COMPLETED | OUTPATIENT
Start: 2023-06-20 | End: 2023-06-20

## 2023-06-20 RX ORDER — ACETAMINOPHEN 500 MG
650 TABLET ORAL EVERY 6 HOURS
Refills: 0 | Status: DISCONTINUED | OUTPATIENT
Start: 2023-06-20 | End: 2023-06-24

## 2023-06-20 RX ORDER — SODIUM CHLORIDE 9 MG/ML
1000 INJECTION INTRAMUSCULAR; INTRAVENOUS; SUBCUTANEOUS
Refills: 0 | Status: DISCONTINUED | OUTPATIENT
Start: 2023-06-20 | End: 2023-06-24

## 2023-06-20 RX ADMIN — Medication 480 MICROGRAM(S): at 21:36

## 2023-06-20 RX ADMIN — APIXABAN 5 MILLIGRAM(S): 2.5 TABLET, FILM COATED ORAL at 05:28

## 2023-06-20 RX ADMIN — Medication 650 MILLIGRAM(S): at 19:44

## 2023-06-20 RX ADMIN — ATORVASTATIN CALCIUM 40 MILLIGRAM(S): 80 TABLET, FILM COATED ORAL at 21:36

## 2023-06-20 RX ADMIN — Medication 400 MILLIGRAM(S): at 18:09

## 2023-06-20 RX ADMIN — APIXABAN 5 MILLIGRAM(S): 2.5 TABLET, FILM COATED ORAL at 18:09

## 2023-06-20 RX ADMIN — Medication 400 MILLIGRAM(S): at 05:28

## 2023-06-20 RX ADMIN — SODIUM CHLORIDE 75 MILLILITER(S): 9 INJECTION INTRAMUSCULAR; INTRAVENOUS; SUBCUTANEOUS at 05:36

## 2023-06-20 RX ADMIN — Medication 100 MILLIGRAM(S): at 14:24

## 2023-06-20 RX ADMIN — LEVOFLOXACIN 500 MILLIGRAM(S): 5 INJECTION, SOLUTION INTRAVENOUS at 05:28

## 2023-06-20 NOTE — PROGRESS NOTE ADULT - PROBLEM SELECTOR PLAN 3
- presenting with elevated creatinine 3.16 compared to baseline creatinine 2.00, eGFR 20  - US Kidney & Bladder unremarkable outside of mild fullness in left renal collecting system. Negative for hydronephrosis or renal masses.  - Bladder scan post void on admission showed 347 cc, mild retention  - pt asymptomatic however, with no complaints of suprapubic fulness, change in urinary frequency or dysuria/hematuria   - Patient also found to have incidental cholelithiasis without evidence of acute cholecystitis on US of Kidney & Bladder  - Lenoflamide (patient's outpatient chemotherapy) can be associated with STEPHEN  - keep off nephrotoxins if possible while inpatient  - trend CMP daily

## 2023-06-20 NOTE — PHYSICAL THERAPY INITIAL EVALUATION ADULT - PERTINENT HX OF CURRENT PROBLEM, REHAB EVAL
72 y old male with a PMHX of follicular lymphoma, s/p R-CHOP in 2003, relapsed 2009, treated with BR, s/p 3 cycles of Obinutuzumab, d/c due to intolerability, now on Tafasitamab & Lenalidomide (Cycle 3, Day 12), on PPx Acyclovir and Levaquin, who presented after outpatient oncologist found patient to be hypotensive to 86/58 admitted to medicine for further mgmt and treatment. US Kidney & Bladder: Revealed mild fullness in left renal collecting system, no hydronephrosis. had Xray of left hand performed on date of admission, 6/20, negative for any fracture, or dislocation.

## 2023-06-20 NOTE — PROGRESS NOTE ADULT - PROBLEM SELECTOR PLAN 1
- per outpatient records, patient on date of admission had hypotension 86/58, at [facility] and encouraged by Dr. Jason Cordova (oncologist ) to present to ED for further workup  - while here patient has had episodes of confusion/subjective weakness  - however, BP has remained improved 146/75, 118/74, afebrile, VSS  - CBC significant for low WBC count and low neutrophils, iso of chemotherapy  - RVP negative on admission  - UA negative  - CXR Unremarkable on admission, no signs of pneumothorax, consolidation, pna, or atelectasis  - Ordered US Kidney and Bladder to r/o urinary retention per Heme/Onc  - US Kidney & Bladder: Revealed mild fullness in left renal collecting system, no hydronephrosis  - Bladder scan post void showed 347 cc, per patient no symptoms of suprapubic fullness or urinary retention, however will continue with bladder scans   - F/u Urine cultures  - Can obtain Blood cultures  - Will continue on home levaquin prophylactically, 500 mg qd  - started IVF, 0.9% NS 75 cc/hr for 10 hours - per outpatient records, patient on date of admission had hypotension 86/58, at [facility] and encouraged by Dr. Jason Cordova (oncologist ) to present to ED for further workup  - while here patient has had episodes of confusion/subjective weakness  - however, BP has remained improved 146/75, 118/74, afebrile, VSS  - CBC significant for low WBC count and low neutrophils, iso of chemotherapy  - RVP negative on admission  - UA negative  - CXR Unremarkable on admission, no signs of pneumothorax, consolidation, pna, or atelectasis  - US Kidney & Bladder: Revealed mild fullness in left renal collecting system, no hydronephrosis  - F/u Urine cultures  - F/u Blood cultures  - Will continue on home levaquin prophylactically, renally dosed

## 2023-06-20 NOTE — PROGRESS NOTE ADULT - ASSESSMENT
This is a 72 y old male with a PMHX of follicular lymphoma, s/p R-CHOP in 2003, relapsed 2009, treated with BR, s/p 3 cycles of Obinutuzumab, d/c due to intolerability, now on Tafasitamab & Lenalidomide (Cycle 3, Day 12), on PPx Acyclovir and Levaquin, who presented after outpatient oncologist found patient to be hypotensive to 86/58 admitted to medicine for further mgmt and treatment.  This is a 72 y old male with a PMHX of follicular lymphoma, s/p R-CHOP in 2003, relapsed 2009, treated with BR, s/p 3 cycles of Obinutuzumab, d/c due to intolerability, now on Tafasitamab & Lenalidomide (Cycle 3, Day 12), on PPx Acyclovir and Levaquin, who presented after outpatient oncologist found patient to be hypotensive to 86/58, now improved, iso severe cognitive impairment and neutropenia, r/o infection

## 2023-06-20 NOTE — PROGRESS NOTE ADULT - ATTENDING COMMENTS
73y F pmh atrial flutter (on apixaban), HTN, mild dementia, HLD, follicular lymphoma with DLBCL (Tx with R–CHOP in 2003 with relapse in 2009, treated with BR), grade IIIA s/p 3 cycles of obinutuzumab (d/cd for intolerability), now on second line therapy with tafasitamab plus lenalidomide currently on cycle 3-day 12 of treatment, presents to the ED for low BP today at [facility], course c/b possible progression of dementia     # Hypotension   -Sent from [facility] for episode of hypotension (BP 80s/50s)  -Since presenting to ED BP has been wnl, S/p 1L NS bolus   - Clinically nontoxic, afebrile, VSS   - Pt immunocompromised, on chemo & currently w/leukopenia high risk of decompensation   - CXR clear, RVP/COVID neg, UA: noninfectious ,  F/u Ucx. Bcx  - Trend labs, fever curve, monitor clinically  - C/w home med acyclovir & Levaquin for Ppx       #STEPHEN:  - Cr 3.16 (bL ~ 1-2) on admission. improved with IVF.   - Suspect Pre-renal azotemia from hypovolemia + ?Med SE from chemo meds    - IVF, trend labs, avoid nephrotoxin      # Lymphoma:   current treatment history as stated above.   current presentation with AMS, STEPHEN, hypotension - ? related to adverse reaction to chemo.   Will follow up with heme     #Dementia  - Baseline dementia w/ periods of increasing agitation and behavior   - Had episode of increased agitation requiring Ativan in ED, now much improved   - On 1:1 observation, will continue and re-eval   - Frequent re-orientation     Will need safe d/c planning given current presentation . reported lives by himself (wife lives in [place]?)

## 2023-06-20 NOTE — PHYSICAL THERAPY INITIAL EVALUATION ADULT - PLANNED THERAPY INTERVENTIONS, PT EVAL
Stair training: GOAL: Pt will negotiate 12 steps using 1 rail independently in 2 weeks./bed mobility training/gait training/transfer training

## 2023-06-20 NOTE — PHYSICAL THERAPY INITIAL EVALUATION ADULT - GENERAL OBSERVATIONS, REHAB EVAL
Pt with h/o lymphoma, p/w hypotension. L hand swelling, x-ray (-) for fx. Pt received semi-supine on stretcher in ED in NAD, VSS, A&Ox2, confused at times on date/situation, mildly impulsive, following all simple commands agreeable to PT.

## 2023-06-20 NOTE — PROGRESS NOTE ADULT - PROBLEM SELECTOR PLAN 2
- on Cycle 3, Day 12 of lenalidomide & tafasitamib  - will continue to be monitored by hem/onc while inpatient here daily  - per Dr. Cordova (outpatient oncologist) patient should continue Tafasitamab 12 mg/kg every 2 weeks once cleared of an acute process  - HOLD Revlimid until neutrophil count recovers, then can resume with dose changed to 10 mg x21 days and CBC with every treatment visit to monitor for neutropenia  - Patient should be continued on Apixaban for VTE ppx and hx of Atrial flutter  -Continue with Acyclovir 400 mg bid and allopurinol prophylactically, as well as levaquin 500 mg qd prophylaxis

## 2023-06-20 NOTE — PROGRESS NOTE ADULT - PROBLEM SELECTOR PLAN 9
DVT Prophylaxis: on Apixaban 5 mg bid  Dispo: Home, pending PT Consult DVT Prophylaxis: on Apixaban 5 mg bid  Dispo: Pending course, patient does not have capacity; HCP: daughter Vonnie  Code: Full

## 2023-06-20 NOTE — CONSULT NOTE ADULT - SUBJECTIVE AND OBJECTIVE BOX
HPI:  This patient is a 74yo gentleman wiht PMH of atrial fibrillation on apixaban, htn, mild dementia, hld, follicular lymphoma with DLBCL, and recurrence of follicular lymphoma p/w hypotension and sore throat.  Patient reportedly had follicular lymphoma with DLBCL (treated with RCHOP in , with relapse in , treated with BR). he was noted to have lymphadenopathy with follicular lmyphoma grade IIIa and weight loss, with monoclonal gammopathies IgG lambda, IgG kall and IgM lambda. He was treated with 3 cycles of obinutuzumab + mini CHOP, but this was stopped due to intolerability/complications. He was then started on tafasitamab (anti-CD19) and lenalidomide 15mg 21/28 days) since 3/30/23. Today is C3 day 13.   He was noted in clinic to be hypotensive to 80s/60s with sore throat and chills. Daughter had reported confusion and visual hallucinations, thus patient was sent to the ED.     Patient reports feeling well. He endorses sore throat, but denies dyspnea or cough or sputum production. He also denies nausea, vomiting, abdominal pain, fever or chills. He denies any gait instability. His PCA reports that the patient was agitated and aggressive with staff members earlier, cursing at them.      PAST MEDICAL & SURGICAL HISTORY:  Non-Hodgkins Lymphoma  In [facility] for > 10 years now with relapse  DM type 2 (diabetes mellitus, type 2) Never on insulin  Essential hypertension  Rhinitis, allergic  Systolic heart failure  Moderate mitral regurgitation  Pleural effusion  Atrial flutter, unspecified type  Impaired memory  Non-Hodgkin lymphoma h/o axillary dissection  Cardiac pacemaker    FAMILY HISTORY:  Family history of CHF (congestive heart failure)  Mother    Family history of non-Hodgkin's lymphoma (Sibling)    Social History:  retired   lives alone, wife lives in [place]  no significant exposure in past to chemicals (2023 21:36)    REVIEW OF SYSTEMS  CONSTITUTIONAL: No fever, no chills, no fatigue  EYES: No eye pain, no vision changes  ENMT:  No difficulty hearing, + throat pain  RESPIRATORY: No cough,  No shortness of breath  CARDIOVASCULAR: No chest pain, no palpitations  GASTROINTESTINAL: No abdominal pain, no nausea, no vomiting, no diarrhea, no constipation,  GENITOURINARY: No dysuria, no hematuria  NEUROLOGICAL: No numbness , no loss of strength  SKIN: No itching, no rashes,    >>> <<<>>> <<<  Allergies  rasburicase (Other)    Intolerances    Medications  MEDICATIONS  (STANDING):  acyclovir   Oral Tab/Cap 400 milliGRAM(s) Oral two times a day  allopurinol 100 milliGRAM(s) Oral daily  apixaban 5 milliGRAM(s) Oral every 12 hours  atorvastatin 40 milliGRAM(s) Oral at bedtime  sodium chloride 0.9%. 1000 milliLiter(s) (75 mL/Hr) IV Continuous <Continuous>    MEDICATIONS  (PRN):  acetaminophen     Tablet .. 650 milliGRAM(s) Oral every 6 hours PRN Temp greater or equal to 38C (100.4F), Mild Pain (1 - 3)  aluminum hydroxide/magnesium hydroxide/simethicone Suspension 30 milliLiter(s) Oral every 4 hours PRN Dyspepsia  melatonin 3 milliGRAM(s) Oral at bedtime PRN Insomnia  ondansetron Injectable 4 milliGRAM(s) IV Push every 8 hours PRN Nausea and/or Vomiting    PHYSICAL EXAM:  GENERAL: NAD, well-groomed  HEAD:  Atraumatic, Normocephalic  EYES: EOMI, PERRLA, conjunctiva and sclera clear  ENMT: No oropharyngeal exudates, Moist mucous membranes  NECK: Supple, no cervical lymphadenopathy  NERVOUS SYSTEM:  alert and conversant, moving all extremities spontaneously   CHEST/LUNG: Clear to auscultation bilaterally; no rhonchi  HEART: Regular rate and rhythm; No murmurs  ABDOMEN: Soft, Nontender, Nondistended  EXTREMITIES:  2+ radial Pulses, No cyanosis or edema  SKIN: warm, dry    LABS:                        10.0   1.46  )-----------( 173      ( 2023 17:37 )             30.8         143  |  103  |  54<H>  ----------------------------<  82  3.6   |  24  |  2.15<H>    Ca    8.6      2023 07:09  Mg     1.4         TPro  7.2  /  Alb  3.8  /  TBili  0.5  /  DBili  x   /  AST  19  /  ALT  12  /  AlkPhos  115  -    PT/INR - ( 2023 17:36 )   PT: 16.7 sec;   INR: 1.43 ratio         PTT - ( 2023 17:36 )  PTT:27.6 sec  Urinalysis Basic - ( 2023 19:23 )    Color: Light Yellow / Appearance: Clear / S.008 / pH: x  Gluc: x / Ketone: Negative  / Bili: Negative / Urobili: Negative   Blood: x / Protein: Trace / Nitrite: Negative   Leuk Esterase: Negative / RBC: 0 /hpf / WBC 1 /HPF   Sq Epi: x / Non Sq Epi: x / Bacteria: Negative        RADIOLOGY & ADDITIONAL STUDIES:  PATHOLOGY:

## 2023-06-20 NOTE — PROGRESS NOTE ADULT - SUBJECTIVE AND OBJECTIVE BOX
DATE OF SERVICE: 23 @ 07:14  --------------------------------------------------------------  Pio Allison MD  PGY-1, Internal Medicine  Pager #: [phone], [facility] [phone]  After 7 PM: Night Float Pager  --------------------------------------------------------------      Patient is a 73y old  Male who presents with a chief complaint of hypotension (2023 21:36)      SUBJECTIVE / OVERNIGHT EVENTS:  No acute events overnight, patient reports feeling well and all questions answered at this time.     Additional Review of Systems:  Denies any other acute sx including f/c, HA, cough, sore throat, eye/ear changes, rhinorrhea, CP, palpitations, SOB, n/v, abdominal pain, back pain, bowel/bladder changes, fatigue, numbness or tingling.    MEDICATIONS  (STANDING):  acyclovir   Oral Tab/Cap 400 milliGRAM(s) Oral two times a day  allopurinol 100 milliGRAM(s) Oral daily  apixaban 5 milliGRAM(s) Oral every 12 hours  atorvastatin 40 milliGRAM(s) Oral at bedtime  sodium chloride 0.9%. 1000 milliLiter(s) (75 mL/Hr) IV Continuous <Continuous>    MEDICATIONS  (PRN):      Vital Signs Last 24 Hrs  T(C): 36.6 (2023 06:13), Max: 36.6 (2023 06:13)  T(F): 97.9 (2023 06:13), Max: 97.9 (2023 06:13)  HR: 71 (2023 06:13) (65 - 83)  BP: 135/67 (2023 06:13) (111/70 - 146/75)  BP(mean): --  RR: 19 (2023 06:13) (16 - 20)  SpO2: 99% (2023 06:13) (94% - 99%)    Parameters below as of 2023 06:13  Patient On (Oxygen Delivery Method): room air      CAPILLARY BLOOD GLUCOSE      POCT Blood Glucose.: 95 mg/dL (2023 16:48)    I&O's Summary      PHYSICAL EXAM:  GENERAL: Clinically well-appearing and comfortable  HEAD:  Atraumatic, Normocephalic  EYES: EOMI, PERRL, conjunctiva and sclera clear  NECK: Supple, No JVD  CHEST/LUNG: Clear to auscultation bilaterally; No wheeze  HEART: Regular rate and rhythm; No murmurs, rubs, or gallops  ABDOMEN: Soft, Nontender, Nondistended; Bowel sounds present  EXTREMITIES:  2+ Peripheral Pulses, No clubbing, cyanosis, or edema  PSYCH: Normal mood, Normal affect  NEUROLOGY: non-focal, moving all four extremities  SKIN: No rashes or lesions    LABS:                        10.0   1.46  )-----------( 173      ( 2023 17:37 )             30.8     06-    139  |  98  |  61<H>  ----------------------------<  82  4.2   |  21<L>  |  3.16<H>    Ca    9.0      2023 17:37  Mg     1.4     -    TPro  7.2  /  Alb  3.8  /  TBili  0.5  /  DBili  x   /  AST  19  /  ALT  12  /  AlkPhos  115  -    PT/INR - ( 2023 17:36 )   PT: 16.7 sec;   INR: 1.43 ratio         PTT - ( 2023 17:36 )  PTT:27.6 sec      Urinalysis Basic - ( 2023 19:23 )    Color: Light Yellow / Appearance: Clear / S.008 / pH: x  Gluc: x / Ketone: Negative  / Bili: Negative / Urobili: Negative   Blood: x / Protein: Trace / Nitrite: Negative   Leuk Esterase: Negative / RBC: 0 /hpf / WBC 1 /HPF   Sq Epi: x / Non Sq Epi: x / Bacteria: Negative        RADIOLOGY & ADDITIONAL TESTS:    Imaging Personally Reviewed:    Consultant(s) Notes Reviewed:      Care Discussed with Consultants/Other Providers:   DATE OF SERVICE: 23 @ 07:14  --------------------------------------------------------------  Pio Allison MD  PGY-1, Internal Medicine  Pager #: [phone], [facility] [phone]  After 7 PM: Night Float Pager  --------------------------------------------------------------      Patient is a 73y old  Male who presents with a chief complaint of hypotension (2023 21:36)      SUBJECTIVE / OVERNIGHT EVENTS:  No acute events overnight, patient reports feeling well except for chronic pain in his left thumb, and all questions answered at this time.     Additional Review of Systems:  Limited 2/2 AMS    MEDICATIONS  (STANDING):  acyclovir   Oral Tab/Cap 400 milliGRAM(s) Oral two times a day  allopurinol 100 milliGRAM(s) Oral daily  apixaban 5 milliGRAM(s) Oral every 12 hours  atorvastatin 40 milliGRAM(s) Oral at bedtime  sodium chloride 0.9%. 1000 milliLiter(s) (75 mL/Hr) IV Continuous <Continuous>    MEDICATIONS  (PRN):      Vital Signs Last 24 Hrs  T(C): 36.6 (2023 06:13), Max: 36.6 (2023 06:13)  T(F): 97.9 (2023 06:13), Max: 97.9 (2023 06:13)  HR: 71 (2023 06:13) (65 - 83)  BP: 135/67 (2023 06:13) (111/70 - 146/75)  BP(mean): --  RR: 19 (2023 06:13) (16 - 20)  SpO2: 99% (2023 06:13) (94% - 99%)    Parameters below as of 2023 06:13  Patient On (Oxygen Delivery Method): room air      CAPILLARY BLOOD GLUCOSE      POCT Blood Glucose.: 95 mg/dL (2023 16:48)    I&O's Summary      PHYSICAL EXAM:  GENERAL: Clinically well-appearing and comfortable  HEAD:  Atraumatic, Normocephalic  EYES: EOMI, PERRL, conjunctiva and sclera clear  NECK: Supple, No JVD  CHEST/LUNG: Clear to auscultation bilaterally; No wheeze  HEART: Regular rate and rhythm; No murmurs, rubs, or gallops  ABDOMEN: Soft, Nontender, Nondistended; Bowel sounds present  EXTREMITIES:  2+ Peripheral Pulses, No clubbing, cyanosis, or edema  PSYCH: Normal mood, Normal affect  NEUROLOGY: Pleasantly confused, CNs II-XII in tact, normal motor strength and sensation throughout except  limited in Lt hand 2/2 pain  SKIN: No rashes or lesions    LABS:                        10.0   1.46  )-----------( 173      ( 2023 17:37 )             30.8     -    139  |  98  |  61<H>  ----------------------------<  82  4.2   |  21<L>  |  3.16<H>    Ca    9.0      2023 17:37  Mg     1.4     -    TPro  7.2  /  Alb  3.8  /  TBili  0.5  /  DBili  x   /  AST  19  /  ALT  12  /  AlkPhos  115  -    PT/INR - ( 2023 17:36 )   PT: 16.7 sec;   INR: 1.43 ratio         PTT - ( 2023 17:36 )  PTT:27.6 sec      Urinalysis Basic - ( 2023 19:23 )    Color: Light Yellow / Appearance: Clear / S.008 / pH: x  Gluc: x / Ketone: Negative  / Bili: Negative / Urobili: Negative   Blood: x / Protein: Trace / Nitrite: Negative   Leuk Esterase: Negative / RBC: 0 /hpf / WBC 1 /HPF   Sq Epi: x / Non Sq Epi: x / Bacteria: Negative        RADIOLOGY & ADDITIONAL TESTS:    Imaging Personally Reviewed:    Consultant(s) Notes Reviewed:      Care Discussed with Consultants/Other Providers:

## 2023-06-20 NOTE — PROGRESS NOTE ADULT - PROBLEM SELECTOR PLAN 8
- for agitation, can continue with ativan as needed   - 1:1 - for agitation, can continue with ativan as needed   - 1:1 removed 6/20  - per OP geriatric team, pt with recent MoCA testing 9/30 indicative of severe cognitive impairment; lives alone and has not been receptive to home care

## 2023-06-20 NOTE — PROGRESS NOTE ADULT - PROBLEM SELECTOR PLAN 5
- patient endorsed some left hand pain/swelling on outpatient  - had Xray of left hand performed on date of admission, 6/20, negative for any fracture, or dislocation  - Advanced first CMC joint arthrosis noted, vascular calcifications noted

## 2023-06-21 ENCOUNTER — APPOINTMENT (OUTPATIENT)
Dept: HEMATOLOGY ONCOLOGY | Facility: CLINIC | Age: 73
End: 2023-06-21

## 2023-06-21 LAB
ALBUMIN SERPL ELPH-MCNC: 3.9 G/DL — SIGNIFICANT CHANGE UP (ref 3.3–5)
ALP SERPL-CCNC: 111 U/L — SIGNIFICANT CHANGE UP (ref 40–120)
ALT FLD-CCNC: 7 U/L — LOW (ref 10–45)
ANION GAP SERPL CALC-SCNC: 13 MMOL/L — SIGNIFICANT CHANGE UP (ref 5–17)
AST SERPL-CCNC: 12 U/L — SIGNIFICANT CHANGE UP (ref 10–40)
BASOPHILS # BLD AUTO: 0.08 K/UL — SIGNIFICANT CHANGE UP (ref 0–0.2)
BASOPHILS NFR BLD AUTO: 5.2 % — HIGH (ref 0–2)
BILIRUB SERPL-MCNC: 0.4 MG/DL — SIGNIFICANT CHANGE UP (ref 0.2–1.2)
BUN SERPL-MCNC: 47 MG/DL — HIGH (ref 7–23)
CALCIUM SERPL-MCNC: 9.4 MG/DL — SIGNIFICANT CHANGE UP (ref 8.4–10.5)
CHLORIDE SERPL-SCNC: 106 MMOL/L — SIGNIFICANT CHANGE UP (ref 96–108)
CO2 SERPL-SCNC: 25 MMOL/L — SIGNIFICANT CHANGE UP (ref 22–31)
CREAT SERPL-MCNC: 1.69 MG/DL — HIGH (ref 0.5–1.3)
EGFR: 43 ML/MIN/1.73M2 — LOW
EOSINOPHIL # BLD AUTO: 0.14 K/UL — SIGNIFICANT CHANGE UP (ref 0–0.5)
EOSINOPHIL NFR BLD AUTO: 8.7 % — HIGH (ref 0–6)
GIANT PLATELETS BLD QL SMEAR: PRESENT — SIGNIFICANT CHANGE UP
GLUCOSE SERPL-MCNC: 79 MG/DL — SIGNIFICANT CHANGE UP (ref 70–99)
HCT VFR BLD CALC: 28.8 % — LOW (ref 39–50)
HGB BLD-MCNC: 9.3 G/DL — LOW (ref 13–17)
LDH SERPL L TO P-CCNC: 197 U/L — SIGNIFICANT CHANGE UP (ref 50–242)
LYMPHOCYTES # BLD AUTO: 0.3 K/UL — LOW (ref 1–3.3)
LYMPHOCYTES # BLD AUTO: 19.1 % — SIGNIFICANT CHANGE UP (ref 13–44)
MAGNESIUM SERPL-MCNC: 1.7 MG/DL — SIGNIFICANT CHANGE UP (ref 1.6–2.6)
MANUAL SMEAR VERIFICATION: SIGNIFICANT CHANGE UP
MCHC RBC-ENTMCNC: 30.9 PG — SIGNIFICANT CHANGE UP (ref 27–34)
MCHC RBC-ENTMCNC: 32.3 GM/DL — SIGNIFICANT CHANGE UP (ref 32–36)
MCV RBC AUTO: 95.7 FL — SIGNIFICANT CHANGE UP (ref 80–100)
MONOCYTES # BLD AUTO: 0.55 K/UL — SIGNIFICANT CHANGE UP (ref 0–0.9)
MONOCYTES NFR BLD AUTO: 34.8 % — HIGH (ref 2–14)
MRSA PCR RESULT.: SIGNIFICANT CHANGE UP
NEUTROPHILS # BLD AUTO: 0.51 K/UL — LOW (ref 1.8–7.4)
NEUTROPHILS NFR BLD AUTO: 26.1 % — LOW (ref 43–77)
NEUTS BAND # BLD: 6.1 % — SIGNIFICANT CHANGE UP (ref 0–8)
PHOSPHATE SERPL-MCNC: 3.2 MG/DL — SIGNIFICANT CHANGE UP (ref 2.5–4.5)
PLAT MORPH BLD: ABNORMAL
PLATELET # BLD AUTO: 185 K/UL — SIGNIFICANT CHANGE UP (ref 150–400)
POTASSIUM SERPL-MCNC: 3.6 MMOL/L — SIGNIFICANT CHANGE UP (ref 3.5–5.3)
POTASSIUM SERPL-SCNC: 3.6 MMOL/L — SIGNIFICANT CHANGE UP (ref 3.5–5.3)
PROT SERPL-MCNC: 7.1 G/DL — SIGNIFICANT CHANGE UP (ref 6–8.3)
RBC # BLD: 3.01 M/UL — LOW (ref 4.2–5.8)
RBC # FLD: 15.3 % — HIGH (ref 10.3–14.5)
RBC BLD AUTO: SIGNIFICANT CHANGE UP
S AUREUS DNA NOSE QL NAA+PROBE: SIGNIFICANT CHANGE UP
SODIUM SERPL-SCNC: 144 MMOL/L — SIGNIFICANT CHANGE UP (ref 135–145)
URATE SERPL-MCNC: 10.1 MG/DL — HIGH (ref 3.4–8.8)
WBC # BLD: 1.58 K/UL — LOW (ref 3.8–10.5)
WBC # FLD AUTO: 1.58 K/UL — LOW (ref 3.8–10.5)

## 2023-06-21 PROCEDURE — 99233 SBSQ HOSP IP/OBS HIGH 50: CPT | Mod: GC

## 2023-06-21 RX ORDER — CHLORHEXIDINE GLUCONATE 213 G/1000ML
1 SOLUTION TOPICAL
Refills: 0 | Status: DISCONTINUED | OUTPATIENT
Start: 2023-06-21 | End: 2023-06-24

## 2023-06-21 RX ADMIN — ATORVASTATIN CALCIUM 40 MILLIGRAM(S): 80 TABLET, FILM COATED ORAL at 21:20

## 2023-06-21 RX ADMIN — Medication 400 MILLIGRAM(S): at 17:05

## 2023-06-21 RX ADMIN — APIXABAN 5 MILLIGRAM(S): 2.5 TABLET, FILM COATED ORAL at 05:27

## 2023-06-21 RX ADMIN — Medication 400 MILLIGRAM(S): at 05:25

## 2023-06-21 RX ADMIN — Medication 100 MILLIGRAM(S): at 11:18

## 2023-06-21 RX ADMIN — APIXABAN 5 MILLIGRAM(S): 2.5 TABLET, FILM COATED ORAL at 17:05

## 2023-06-21 NOTE — PROGRESS NOTE ADULT - PROBLEM SELECTOR PLAN 4
- c/w home apixaban 5 mg bid   - admit to telemetry - presenting with elevated creatinine 3.16 compared to baseline creatinine 2.00, eGFR 20  - US Kidney & Bladder unremarkable outside of mild fullness in left renal collecting system. Negative for hydronephrosis or renal masses.  - Bladder scan post void on admission showed 347 cc, mild retention  - pt asymptomatic however, with no complaints of suprapubic fulness, change in urinary frequency or dysuria/hematuria   - Patient also found to have incidental cholelithiasis without evidence of acute cholecystitis on US of Kidney & Bladder  - Lenoflamide (patient's outpatient chemotherapy) can be associated with STEPHEN  - keep off nephrotoxins if possible while inpatient  - trend CMP daily  IMPROVED

## 2023-06-21 NOTE — PROGRESS NOTE ADULT - SUBJECTIVE AND OBJECTIVE BOX
DATE OF SERVICE: 23 @ 06:52  --------------------------------------------------------------  Pio Allison MD  PGY-1, Internal Medicine  Pager #: [phone], [facility] [phone]  After 7 PM: Night Float Pager  --------------------------------------------------------------      Patient is a 73y old  Male who presents with a chief complaint of hypotension (2023 18:32)      SUBJECTIVE / OVERNIGHT EVENTS:  No acute events overnight, patient reports feeling well and all questions answered at this time.     Additional Review of Systems:  Denies any other acute sx including f/c, HA, cough, sore throat, eye/ear changes, rhinorrhea, CP, palpitations, SOB, n/v, abdominal pain, back pain, bowel/bladder changes, fatigue, numbness or tingling.    MEDICATIONS  (STANDING):  acyclovir   Oral Tab/Cap 400 milliGRAM(s) Oral two times a day  allopurinol 100 milliGRAM(s) Oral daily  apixaban 5 milliGRAM(s) Oral every 12 hours  atorvastatin 40 milliGRAM(s) Oral at bedtime  levoFLOXacin  Tablet 250 milliGRAM(s) Oral every 24 hours  sodium chloride 0.9%. 1000 milliLiter(s) (75 mL/Hr) IV Continuous <Continuous>    MEDICATIONS  (PRN):  acetaminophen     Tablet .. 650 milliGRAM(s) Oral every 6 hours PRN Temp greater or equal to 38C (100.4F), Mild Pain (1 - 3)  aluminum hydroxide/magnesium hydroxide/simethicone Suspension 30 milliLiter(s) Oral every 4 hours PRN Dyspepsia  melatonin 3 milliGRAM(s) Oral at bedtime PRN Insomnia  ondansetron Injectable 4 milliGRAM(s) IV Push every 8 hours PRN Nausea and/or Vomiting      Vital Signs Last 24 Hrs  T(C): 36.7 (2023 01:46), Max: 36.7 (:46)  T(F): 98.1 (:46), Max: 98.1 (:46)  HR: 82 (:46) (74 - 84)  BP: 160/70 (:46) (100/62 - 160/73)  BP(mean): --  RR: 18 (:46) (18 - 18)  SpO2: 100% () (99% - 100%)    Parameters below as of   Patient On (Oxygen Delivery Method): room air      CAPILLARY BLOOD GLUCOSE        I&O's Summary      PHYSICAL EXAM:  GENERAL: Clinically well-appearing and comfortable  HEAD:  Atraumatic, Normocephalic  EYES: EOMI, PERRL, conjunctiva and sclera clear  NECK: Supple, No JVD  CHEST/LUNG: Clear to auscultation bilaterally; No wheeze  HEART: Regular rate and rhythm; No murmurs, rubs, or gallops  ABDOMEN: Soft, Nontender, Nondistended; Bowel sounds present  EXTREMITIES:  2+ Peripheral Pulses, No clubbing, cyanosis, or edema  PSYCH: Normal mood, Normal affect  NEUROLOGY: non-focal, moving all four extremities  SKIN: No rashes or lesions    LABS:                        10.0   1.46  )-----------( 173      ( 2023 17:37 )             30.8     06-20    143  |  103  |  54<H>  ----------------------------<  82  3.6   |  24  |  2.15<H>    Ca    8.6      2023 07:09  Mg     1.4     -    TPro  7.2  /  Alb  3.8  /  TBili  0.5  /  DBili  x   /  AST  19  /  ALT  12  /  AlkPhos  115  06-19    PT/INR - ( 2023 17:36 )   PT: 16.7 sec;   INR: 1.43 ratio         PTT - ( 2023 17:36 )  PTT:27.6 sec      Urinalysis Basic - ( 2023 19:23 )    Color: Light Yellow / Appearance: Clear / S.008 / pH: x  Gluc: x / Ketone: Negative  / Bili: Negative / Urobili: Negative   Blood: x / Protein: Trace / Nitrite: Negative   Leuk Esterase: Negative / RBC: 0 /hpf / WBC 1 /HPF   Sq Epi: x / Non Sq Epi: x / Bacteria: Negative        RADIOLOGY & ADDITIONAL TESTS:    Imaging Personally Reviewed:    Consultant(s) Notes Reviewed:      Care Discussed with Consultants/Other Providers:   DATE OF SERVICE: 23 @ 06:52  --------------------------------------------------------------  Pio Allison MD  PGY-1, Internal Medicine  Pager #: [phone], [facility] [phone]  After 7 PM: Night Float Pager  --------------------------------------------------------------      Patient is a 73y old  Male who presents with a chief complaint of hypotension (2023 18:32)      SUBJECTIVE / OVERNIGHT EVENTS:  No acute events overnight, patient reports feeling well except for a tooth ache which started last week.    Additional Review of Systems:  Limited 2/2 AMS    MEDICATIONS  (STANDING):  acyclovir   Oral Tab/Cap 400 milliGRAM(s) Oral two times a day  allopurinol 100 milliGRAM(s) Oral daily  apixaban 5 milliGRAM(s) Oral every 12 hours  atorvastatin 40 milliGRAM(s) Oral at bedtime  levoFLOXacin  Tablet 250 milliGRAM(s) Oral every 24 hours  sodium chloride 0.9%. 1000 milliLiter(s) (75 mL/Hr) IV Continuous <Continuous>    MEDICATIONS  (PRN):  acetaminophen     Tablet .. 650 milliGRAM(s) Oral every 6 hours PRN Temp greater or equal to 38C (100.4F), Mild Pain (1 - 3)  aluminum hydroxide/magnesium hydroxide/simethicone Suspension 30 milliLiter(s) Oral every 4 hours PRN Dyspepsia  melatonin 3 milliGRAM(s) Oral at bedtime PRN Insomnia  ondansetron Injectable 4 milliGRAM(s) IV Push every 8 hours PRN Nausea and/or Vomiting      Vital Signs Last 24 Hrs  T(C): 36.7 (2023 01:46), Max: 36.7 (2023 01:46)  T(F): 98.1 (2023 01:46), Max: 98.1 (2023 01:46)  HR: 82 (2023 01:46) (74 - 84)  BP: 160/70 (2023 01:46) (100/62 - 160/73)  BP(mean): --  RR: 18 (2023 01:46) (18 - 18)  SpO2: 100% (2023 01:46) (99% - 100%)    Parameters below as of :46  Patient On (Oxygen Delivery Method): room air      CAPILLARY BLOOD GLUCOSE        I&O's Summary    PHYSICAL EXAM:  GENERAL: Clinically well-appearing and comfortable  HEAD:  Atraumatic, Normocephalic  EYES: EOMI, PERRL, conjunctiva and sclera clear  NECK: Supple, No JVD  CHEST/LUNG: Clear to auscultation bilaterally; No wheeze  HEART: Regular rate and rhythm; No murmurs, rubs, or gallops  ABDOMEN: Soft, Nontender, Nondistended; Bowel sounds present  EXTREMITIES:  2+ Peripheral Pulses, No clubbing, cyanosis, or edema  PSYCH: Normal mood, Normal affect  NEUROLOGY: Pleasantly severely confused, non-focal, ambulatory without assistance; when prompted about active medical issues, repeatedly only remarks on toothache  SKIN: No rashes or lesions      LABS:                        10.0   1.46  )-----------( 173      ( 2023 17:37 )             30.8     06-20    143  |  103  |  54<H>  ----------------------------<  82  3.6   |  24  |  2.15<H>    Ca    8.6      2023 07:09  Mg     1.4     -    TPro  7.2  /  Alb  3.8  /  TBili  0.5  /  DBili  x   /  AST  19  /  ALT  12  /  AlkPhos  115  06-19    PT/INR - ( 2023 17:36 )   PT: 16.7 sec;   INR: 1.43 ratio         PTT - ( 2023 17:36 )  PTT:27.6 sec      Urinalysis Basic - ( 2023 19:23 )    Color: Light Yellow / Appearance: Clear / S.008 / pH: x  Gluc: x / Ketone: Negative  / Bili: Negative / Urobili: Negative   Blood: x / Protein: Trace / Nitrite: Negative   Leuk Esterase: Negative / RBC: 0 /hpf / WBC 1 /HPF   Sq Epi: x / Non Sq Epi: x / Bacteria: Negative        RADIOLOGY & ADDITIONAL TESTS:    Imaging Personally Reviewed:    Consultant(s) Notes Reviewed:      Care Discussed with Consultants/Other Providers:

## 2023-06-21 NOTE — PROGRESS NOTE ADULT - PROBLEM SELECTOR PLAN 5
- patient endorsed some left hand pain/swelling on outpatient  - had Xray of left hand performed on date of admission, 6/20, negative for any fracture, or dislocation  - Advanced first CMC joint arthrosis noted, vascular calcifications noted - c/w home apixaban 5 mg bid   - telemetry

## 2023-06-21 NOTE — PROGRESS NOTE ADULT - PROBLEM SELECTOR PLAN 3
- presenting with elevated creatinine 3.16 compared to baseline creatinine 2.00, eGFR 20  - US Kidney & Bladder unremarkable outside of mild fullness in left renal collecting system. Negative for hydronephrosis or renal masses.  - Bladder scan post void on admission showed 347 cc, mild retention  - pt asymptomatic however, with no complaints of suprapubic fulness, change in urinary frequency or dysuria/hematuria   - Patient also found to have incidental cholelithiasis without evidence of acute cholecystitis on US of Kidney & Bladder  - Lenoflamide (patient's outpatient chemotherapy) can be associated with STEPHEN  - keep off nephrotoxins if possible while inpatient  - trend CMP daily - for agitation, can continue with low-dose ativan and zyprexa PRN  - 1:1 removed 6/20, c/w enhanced observation  - per OP geriatric team, pt with recent MoCA testing 9/30 indicative of severe cognitive impairment; lives alone and has not been receptive to home care  - does not have capacity to leave AMA, HCP is daughter Vonnie

## 2023-06-21 NOTE — PROVIDER CONTACT NOTE (OTHER) - SITUATION
First time event - pt had 5 beats WCT up to 114 bpm for 2.5 seconds
Patient had episode of MAT on tele with  HR- 130's

## 2023-06-21 NOTE — PROGRESS NOTE ADULT - PROBLEM SELECTOR PLAN 1
- per outpatient records, patient on date of admission had hypotension 86/58, at [facility] [place] and encouraged by Dr. Jason Cordova (oncologist ) to present to ED for further workup  - while here patient has had episodes of confusion/subjective weakness  - however, BP has remained improved 146/75, 118/74, afebrile, VSS  - CBC significant for low WBC count and low neutrophils, iso of chemotherapy  - RVP negative on admission  - UA negative  - CXR Unremarkable on admission, no signs of pneumothorax, consolidation, pna, or atelectasis  - US Kidney & Bladder: Revealed mild fullness in left renal collecting system, no hydronephrosis  - F/u Urine cultures  - F/u Blood cultures  - Will continue on home levaquin prophylactically, renally dosed - per outpatient records, patient on date of admission had hypotension 86/58, at [facility] and encouraged by Dr. Jason Cordova (oncologist ) to present to ED for further workup  - while here patient has had episodes of confusion/subjective weakness  - however, BP has remained improved 146/75, 118/74, afebrile, VSS  - CBC significant for low WBC count and low neutrophils, iso of chemotherapy  - RVP negative on admission  - UA negative  - CXR Unremarkable on admission, no signs of pneumothorax, consolidation, pna, or atelectasis  - US Kidney & Bladder: Revealed mild fullness in left renal collecting system, no hydronephrosis  - Ur Cx NGTD  - Blood cultures NGTD prelim  - Will continue on home levaquin prophylactically, renally dosed

## 2023-06-21 NOTE — PATIENT PROFILE ADULT - FALL HARM RISK - HARM RISK INTERVENTIONS

## 2023-06-21 NOTE — PROVIDER CONTACT NOTE (OTHER) - ASSESSMENT
Patient A & O x3. Asymptomatic. BP_ 106/59, HR- 103.
Pt denies chest pain or heart palpitations. VSS: /70, HR 82, temp 98.1, RR 18, SpO2 100% on room air

## 2023-06-21 NOTE — PROGRESS NOTE ADULT - PROBLEM SELECTOR PLAN 6
- patient presented with low Mg, repleted   - will recheck BMP in AM - patient presented with low Mg, replete PRN  RESOLVED

## 2023-06-21 NOTE — PROGRESS NOTE ADULT - ASSESSMENT
This is a 72 y old male with a PMHX of follicular lymphoma, s/p R-CHOP in 2003, relapsed 2009, treated with BR, s/p 3 cycles of Obinutuzumab, d/c due to intolerability, now on Tafasitamab & Lenalidomide (Cycle 3, Day 12), on PPx Acyclovir and Levaquin, who presented after outpatient oncologist found patient to be hypotensive to 86/58, now improved, iso severe cognitive impairment and neutropenia, r/o infection

## 2023-06-21 NOTE — PROGRESS NOTE ADULT - PROBLEM SELECTOR PLAN 2
- on Cycle 3, Day 12 of lenalidomide & tafasitamib  - will continue to be monitored by hem/onc while inpatient here daily  - per Dr. Cordova (outpatient oncologist) patient should continue Tafasitamab 12 mg/kg every 2 weeks once cleared of an acute process  - HOLD Revlimid until neutrophil count recovers, then can resume with dose changed to 10 mg x21 days and CBC with every treatment visit to monitor for neutropenia  - Patient should be continued on Apixaban for VTE ppx and hx of Atrial flutter  -Continue with Acyclovir 400 mg bid and allopurinol prophylactically, as well as levaquin 500 mg qd prophylaxis - on Cycle 3, Day 12 of lenalidomide & tafasitamib  - will continue to be monitored by hem/onc while inpatient here daily  - per Dr. Cordova (outpatient oncologist) patient should continue Tafasitamab 12 mg/kg every 2 weeks once cleared of an acute process  - HOLD Revlimid until neutrophil count recovers, then can resume with dose changed to 10 mg x21 days and CBC with every treatment visit to monitor for neutropenia  - Patient should be continued on Apixaban for VTE ppx and hx of Atrial flutter  - Continue with Acyclovir 400 mg bid and allopurinol prophylactically, as well as levaquin 500 mg qd prophylaxis

## 2023-06-21 NOTE — PROGRESS NOTE ADULT - PROBLEM SELECTOR PLAN 9
DVT Prophylaxis: on Apixaban 5 mg bid  Dispo: Pending course, patient does not have capacity; HCP: daughter Vonnie  Code: Full

## 2023-06-21 NOTE — PROVIDER CONTACT NOTE (OTHER) - REASON
Patient had episode of MAT on tele with  HR- 130's
First time event - pt had 5 beats WCT up to 114 bpm for 2.5 seconds

## 2023-06-21 NOTE — PROGRESS NOTE ADULT - PROBLEM SELECTOR PLAN 8
- for agitation, can continue with ativan as needed   - 1:1 removed 6/20  - per OP geriatric team, pt with recent MoCA testing 9/30 indicative of severe cognitive impairment; lives alone and has not been receptive to home care - patient endorsed some left hand pain/swelling on outpatient  - had Xray of left hand performed on date of admission, 6/20, negative for any fracture, or dislocation  - Advanced first CMC joint arthrosis noted, vascular calcifications noted

## 2023-06-21 NOTE — PATIENT PROFILE ADULT - FUNCTIONAL ASSESSMENT - BASIC MOBILITY 3.
I have reviewed discharge instructions with the patient. The patient verbalized understanding. Provider E-Visit time total (minutes): 5   4 = No assist / stand by assistance

## 2023-06-21 NOTE — PATIENT PROFILE ADULT - FUNCTIONAL ASSESSMENT - BASIC MOBILITY 6.
4-calculated by average /Not able to assess (calculate score using [facility] averaging method)

## 2023-06-21 NOTE — PROVIDER CONTACT NOTE (OTHER) - BACKGROUND
Admitted for acute renal failure
73 year old male admitted for acute renal failure. PMH HTN, dementia, HLD, Non-Hodgkins lymphoma, DM type 2, moderate mitral regurgitation

## 2023-06-22 ENCOUNTER — TRANSCRIPTION ENCOUNTER (OUTPATIENT)
Age: 73
End: 2023-06-22

## 2023-06-22 LAB
-  STAPHYLOCOCCUS EPIDERMIDIS, METHICILLIN RESISTANT: SIGNIFICANT CHANGE UP
ALBUMIN SERPL ELPH-MCNC: 4 G/DL — SIGNIFICANT CHANGE UP (ref 3.3–5)
ALP SERPL-CCNC: 113 U/L — SIGNIFICANT CHANGE UP (ref 40–120)
ALT FLD-CCNC: 7 U/L — LOW (ref 10–45)
ANION GAP SERPL CALC-SCNC: 14 MMOL/L — SIGNIFICANT CHANGE UP (ref 5–17)
AST SERPL-CCNC: 14 U/L — SIGNIFICANT CHANGE UP (ref 10–40)
BASOPHILS # BLD AUTO: 0.15 K/UL — SIGNIFICANT CHANGE UP (ref 0–0.2)
BASOPHILS NFR BLD AUTO: 3.5 % — HIGH (ref 0–2)
BILIRUB SERPL-MCNC: 0.5 MG/DL — SIGNIFICANT CHANGE UP (ref 0.2–1.2)
BUN SERPL-MCNC: 36 MG/DL — HIGH (ref 7–23)
CALCIUM SERPL-MCNC: 9.8 MG/DL — SIGNIFICANT CHANGE UP (ref 8.4–10.5)
CHLORIDE SERPL-SCNC: 105 MMOL/L — SIGNIFICANT CHANGE UP (ref 96–108)
CO2 SERPL-SCNC: 25 MMOL/L — SIGNIFICANT CHANGE UP (ref 22–31)
CREAT SERPL-MCNC: 1.51 MG/DL — HIGH (ref 0.5–1.3)
CULTURE RESULTS: SIGNIFICANT CHANGE UP
EGFR: 49 ML/MIN/1.73M2 — LOW
EOSINOPHIL # BLD AUTO: 0.11 K/UL — SIGNIFICANT CHANGE UP (ref 0–0.5)
EOSINOPHIL NFR BLD AUTO: 2.6 % — SIGNIFICANT CHANGE UP (ref 0–6)
GLUCOSE SERPL-MCNC: 78 MG/DL — SIGNIFICANT CHANGE UP (ref 70–99)
GRAM STN FLD: SIGNIFICANT CHANGE UP
HCT VFR BLD CALC: 28 % — LOW (ref 39–50)
HGB BLD-MCNC: 9.1 G/DL — LOW (ref 13–17)
LYMPHOCYTES # BLD AUTO: 0.5 K/UL — LOW (ref 1–3.3)
LYMPHOCYTES # BLD AUTO: 11.3 % — LOW (ref 13–44)
MAGNESIUM SERPL-MCNC: 1.6 MG/DL — SIGNIFICANT CHANGE UP (ref 1.6–2.6)
MANUAL SMEAR VERIFICATION: SIGNIFICANT CHANGE UP
MCHC RBC-ENTMCNC: 31.5 PG — SIGNIFICANT CHANGE UP (ref 27–34)
MCHC RBC-ENTMCNC: 32.5 GM/DL — SIGNIFICANT CHANGE UP (ref 32–36)
MCV RBC AUTO: 96.9 FL — SIGNIFICANT CHANGE UP (ref 80–100)
METAMYELOCYTES # FLD: 0.9 % — HIGH (ref 0–0)
METHOD TYPE: SIGNIFICANT CHANGE UP
MONOCYTES # BLD AUTO: 0.8 K/UL — SIGNIFICANT CHANGE UP (ref 0–0.9)
MONOCYTES NFR BLD AUTO: 18.2 % — HIGH (ref 2–14)
NEUTROPHILS # BLD AUTO: 2.81 K/UL — SIGNIFICANT CHANGE UP (ref 1.8–7.4)
NEUTROPHILS NFR BLD AUTO: 49.6 % — SIGNIFICANT CHANGE UP (ref 43–77)
NEUTS BAND # BLD: 13.9 % — HIGH (ref 0–8)
ORGANISM # SPEC MICROSCOPIC CNT: SIGNIFICANT CHANGE UP
ORGANISM # SPEC MICROSCOPIC CNT: SIGNIFICANT CHANGE UP
PHOSPHATE SERPL-MCNC: 2.8 MG/DL — SIGNIFICANT CHANGE UP (ref 2.5–4.5)
PLAT MORPH BLD: NORMAL — SIGNIFICANT CHANGE UP
PLATELET # BLD AUTO: 204 K/UL — SIGNIFICANT CHANGE UP (ref 150–400)
POTASSIUM SERPL-MCNC: 3.9 MMOL/L — SIGNIFICANT CHANGE UP (ref 3.5–5.3)
POTASSIUM SERPL-SCNC: 3.9 MMOL/L — SIGNIFICANT CHANGE UP (ref 3.5–5.3)
PROT SERPL-MCNC: 7.1 G/DL — SIGNIFICANT CHANGE UP (ref 6–8.3)
RBC # BLD: 2.89 M/UL — LOW (ref 4.2–5.8)
RBC # FLD: 15.6 % — HIGH (ref 10.3–14.5)
RBC BLD AUTO: SIGNIFICANT CHANGE UP
SODIUM SERPL-SCNC: 144 MMOL/L — SIGNIFICANT CHANGE UP (ref 135–145)
SPECIMEN SOURCE: SIGNIFICANT CHANGE UP
WBC # BLD: 4.42 K/UL — SIGNIFICANT CHANGE UP (ref 3.8–10.5)
WBC # FLD AUTO: 4.42 K/UL — SIGNIFICANT CHANGE UP (ref 3.8–10.5)

## 2023-06-22 PROCEDURE — 99232 SBSQ HOSP IP/OBS MODERATE 35: CPT

## 2023-06-22 PROCEDURE — 99233 SBSQ HOSP IP/OBS HIGH 50: CPT | Mod: GC

## 2023-06-22 RX ORDER — METOPROLOL TARTRATE 50 MG
25 TABLET ORAL DAILY
Refills: 0 | Status: DISCONTINUED | OUTPATIENT
Start: 2023-06-22 | End: 2023-06-24

## 2023-06-22 RX ADMIN — Medication 400 MILLIGRAM(S): at 18:17

## 2023-06-22 RX ADMIN — ATORVASTATIN CALCIUM 40 MILLIGRAM(S): 80 TABLET, FILM COATED ORAL at 21:09

## 2023-06-22 RX ADMIN — APIXABAN 5 MILLIGRAM(S): 2.5 TABLET, FILM COATED ORAL at 18:17

## 2023-06-22 RX ADMIN — APIXABAN 5 MILLIGRAM(S): 2.5 TABLET, FILM COATED ORAL at 05:10

## 2023-06-22 RX ADMIN — CHLORHEXIDINE GLUCONATE 1 APPLICATION(S): 213 SOLUTION TOPICAL at 05:12

## 2023-06-22 RX ADMIN — Medication 25 MILLIGRAM(S): at 12:28

## 2023-06-22 RX ADMIN — Medication 400 MILLIGRAM(S): at 05:10

## 2023-06-22 NOTE — DIETITIAN INITIAL EVALUATION ADULT - ORAL INTAKE PTA/DIET HISTORY
Unable to assess intake/diet hx at this time; RD to reassess as able.   - No known food allergies or intolerances noted per patient profile.   - Per outpatient profile, pt taking a Multivitamin and folic acid supplementation PTA.   - No difficulty chewing/swallowing reported at this time.

## 2023-06-22 NOTE — PROGRESS NOTE ADULT - PROBLEM SELECTOR PLAN 2
- on Cycle 3, Day 12 of lenalidomide & tafasitamib  - will continue to be monitored by hem/onc while inpatient here daily  - per Dr. Cordova (outpatient oncologist) patient should continue Tafasitamab 12 mg/kg every 2 weeks once cleared of an acute process  - HOLD Revlimid until neutrophil count recovers, then can resume with dose changed to 10 mg x21 days and CBC with every treatment visit to monitor for neutropenia  - Patient should be continued on Apixaban for VTE ppx and hx of Atrial flutter  - Continue with Acyclovir 400 mg bid and allopurinol prophylactically, as well as levaquin 500 mg qd prophylaxis - per outpatient records, patient on date of admission had hypotension 86/58, at [facility] and encouraged by Dr. Jason Cordova (oncologist ) to present to ED for further workup  - while here patient has had episodes of confusion  - however, BP has remained improved 146/75, 118/74, afebrile, VSS  - CBC significant for low WBC count and low neutrophils, iso of chemotherapy  - RVP negative on admission  - UA negative  - CXR Unremarkable on admission, no signs of pneumothorax, consolidation, pna, or atelectasis  - US Kidney & Bladder: Revealed mild fullness in left renal collecting system, no hydronephrosis  - Ur Cx NGTD  - Blood cultures 1/4+ for MRSE 6/21, will recheck but likely contaminant and should not preclude d/c  - Will continue on home levaquin prophylactically, renally dosed  IMPROVED

## 2023-06-22 NOTE — DIETITIAN INITIAL EVALUATION ADULT - FACTORS AFF FOOD INTAKE
Discharge instructions reviewed with pt. Pt voiced understanding. IV removed .   Daughter is coming to  mother none

## 2023-06-22 NOTE — DIETITIAN INITIAL EVALUATION ADULT - OTHER INFO
- UBW: unable to assess at this time. Per Previous RD note (12/09/22): UBW was reported as ~145 pounds (with wt loss from 180 pounds x 1 year prior).   - Dosing wt: 156 pounds (6/19)  - Wt hx per Previous RD notes: 125 pounds (12/06/22), 145 pounds (11/02/22), 145 pounds (9/28/22), 214 pounds (5/10/2019).   - RD to continue to monitor weight trends as able.   - Nutritionally Pertinent Meds in-house: Abx, IVF, atorvastatin, zofran.   - Nutritionally Pertinent Labs: high BUN/Cr.   - Heme/Onc:       - Recurrent follicular lymphoma

## 2023-06-22 NOTE — DIETITIAN INITIAL EVALUATION ADULT - NSFNSGIIOFT_GEN_A_CORE
No current N/V/D/C reported. Per flowsheets, no BM documented x admission. No current bowel regimen in place.

## 2023-06-22 NOTE — PROGRESS NOTE ADULT - ATTENDING COMMENTS
discussed about plan with resident on rounds.  labs and imaging reviewed.  I agree with the above findings, assessment, and plan with the following additions and exceptions:  73F pmh atrial flutter (on apixaban), HTN, mild dementia, HLD, follicular lymphoma with DLBCL (Tx with R–CHOP in 2003 with relapse in 2009, treated with BR), grade IIIA s/p 3 cycles of obinutuzumab (d/cd for intolerability), now on second line therapy with tafasitamab plus lenalidomide currently on cycle 3-day 12 of treatment, presents to the ED for low BP today at routine follow-up at [facility], course c/b possible progression of dementia.     # Hypotension   -Sent from [facility] for episode of hypotension (BP 80s/50s)  -Since presenting to ED BP has been wnl, S/p 1L NS bolus   - Clinically nontoxic, afebrile, VSS   - Pt immunocompromised, on chemo & currently w/leukopenia high risk of decompensation   - admission BCx showing 1/4 bottle MRSE. suspect contaminant given lack of any clinical s+s of infection.   - CXR clear, RVP/COVID neg, UA: noninfectious, Ucx.    - Trend labs, fever curve, monitor clinically  - C/w home med acyclovir & Levaquin for Ppx       #STEPHEN:  - Cr 3.16 on admission (bL ~ 1-2) on admission. improved s/p IVF.   - Suspect Pre-renal azotemia from hypovolemia + ?Med SE from chemo meds    - IVF, trend labs, avoid nephrotoxin      # Lymphoma:   current treatment history as stated above.   current presentation with AMS, STEPHEN, hypotension - ? related to adverse reaction to chemo.   Will follow up with heme     #Dementia  - Baseline dementia w/ periods of increasing agitation and behavior   - Had episode of increased agitation requiring Ativan in ED, now much improved   - Off 1:1 observation - though confused, the patient is calm and cooperative    - Frequent re-orientation, fall precautions    Will need safe d/c planning given current presentation. reported lives by himself . wife now in [place] but separate? not living together  Daughter lives nearby and have been supportive. no skilled PT needs  Need Lul skinner

## 2023-06-22 NOTE — DISCHARGE NOTE NURSING/CASE MANAGEMENT/SOCIAL WORK - NSDCCRTYPESERV_GEN_ALL_CORE_FT
Provides a free  who can apply for meals-on-wheels, Medicaid, SNAP (food stamps), etc. Also provides subsidized housekeeping and personal care services.

## 2023-06-22 NOTE — DIETITIAN INITIAL EVALUATION ADULT - PERTINENT LABORATORY DATA
06-22    144  |  105  |  36<H>  ----------------------------<  78  3.9   |  25  |  1.51<H>    Ca    9.8      22 Jun 2023 07:25  Phos  2.8     06-22  Mg     1.6     06-22    TPro  7.1  /  Alb  4.0  /  TBili  0.5  /  DBili  x   /  AST  14  /  ALT  7<L>  /  AlkPhos  113  06-22  A1C with Estimated Average Glucose Result: 6.2 % (10-29-22 @ 05:59)  A1C with Estimated Average Glucose Result: 5.4 % (08-29-22 @ 05:55)

## 2023-06-22 NOTE — DIETITIAN INITIAL EVALUATION ADULT - NSFNSNUTRCHEWSWALLOWFT_GEN_A_CORE
- Pre Previous RD notes, pt ordered for texture-modified diet during previous 2 admissions; RD to recommend SLP evaluation to determine appropriate texture/consistency diet.

## 2023-06-22 NOTE — PROVIDER CONTACT NOTE (CRITICAL VALUE NOTIFICATION) - NS PROVIDER READ BACK TO LAB
Allan is requesting a refill for the next 3 months of his Adderall XR 30 mg 2x daily. He is wondering if you could have the start date be the 25th rather than the 26th? If you are not able to that is ok. Pt last seen for ADHD med check 8/26/19.    Please e-scribe to [facility]. Thanks.  
Approved. Please inform, and notify him he will need OV for further refills.   
Called Allan and informed him.  
yes

## 2023-06-22 NOTE — PROGRESS NOTE ADULT - PROBLEM SELECTOR PLAN 1
- per outpatient records, patient on date of admission had hypotension 86/58, at [facility] and encouraged by Dr. Jason Cordova (oncologist ) to present to ED for further workup  - while here patient has had episodes of confusion/subjective weakness  - however, BP has remained improved 146/75, 118/74, afebrile, VSS  - CBC significant for low WBC count and low neutrophils, iso of chemotherapy  - RVP negative on admission  - UA negative  - CXR Unremarkable on admission, no signs of pneumothorax, consolidation, pna, or atelectasis  - US Kidney & Bladder: Revealed mild fullness in left renal collecting system, no hydronephrosis  - Ur Cx NGTD  - Blood cultures NGTD prelim  - Will continue on home levaquin prophylactically, renally dosed - for agitation, can continue with low-dose ativan and zyprexa PRN  - 1:1 removed 6/20, c/w enhanced observation  - per OP geriatric team, pt with recent MoCA testing 9/30 indicative of severe cognitive impairment; lives alone and has not been receptive to home care; 6/22 patient expresses willingness to accept assistance  ==consult SW regarding home assessment  - does not have capacity to leave AMA, HCP is daughter Vonnie

## 2023-06-22 NOTE — DISCHARGE NOTE NURSING/CASE MANAGEMENT/SOCIAL WORK - ADDITIONAL RESOURCE TYPE OF SERVICE
Provides free transportation within the [facility]. Call the # above for more information.

## 2023-06-22 NOTE — DIETITIAN INITIAL EVALUATION ADULT - REASON FOR ADMISSION
Acute renal failure    Per chart: 73-year-old male with a past medical history of atrial flutter (on apixaban), HTN, mild dementia, HLD, follicular lymphoma with DLBCL (Tx with R–CHOP in 2003 with relapse in 2009, treated with BR), grade IIIA s/p 3 cyles of obinutuzumab which was discontinued due to intolerability, now on second line therapy with tafasitamab plus lenalidomide currently on cycle 3-day 12 of treatment, on PPx acyclovir and Levaquin, who presents to the ED for low BP today at routine follow-up at [facility].

## 2023-06-22 NOTE — DIETITIAN INITIAL EVALUATION ADULT - PROBLEM SELECTOR PLAN 1
- per outpatient records, patient on date of admission had hypotension 86/58, at [facility] and encouraged by Dr. Jason Cordova (oncologist ) to present to ED for further workup  - while here patient has had episodes of confusion/subjective weakness  - however, BP has remained improved 146/75, 118/74, afebrile, VSS  - CBC significant for low WBC count and low neutrophils, iso of chemotherapy  - RVP negative on admission  - UA negative  - CXR Unremarkable on admission, no signs of pneumothorax, consolidation, pna, or atelectasis  - Ordered US Kidney and Bladder to r/o urinary retention per Heme/Onc  - US Kidney & Bladder: Revealed mild fullness in left renal collecting system, no hydronephrosis  - Bladder scan post void showed 347 cc, per patient no symptoms of suprapubic fullness or urinary retention, however will continue with bladder scans   - F/u Urine cultures  - Can obtain Blood cultures  - Will continue on home levaquin prophylactically, 500 mg qd  - started IVF, 0.9% NS 75 cc/hr for 10 hours

## 2023-06-22 NOTE — DIETITIAN INITIAL EVALUATION ADULT - ORAL NUTRITION SUPPLEMENTS
Recommend providing Ensure Enlive 2x/day to optimize protein-energy intake in setting of increased needs.

## 2023-06-22 NOTE — DIETITIAN INITIAL EVALUATION ADULT - PERTINENT MEDS FT
MEDICATIONS  (STANDING):  acyclovir   Oral Tab/Cap 400 milliGRAM(s) Oral two times a day  allopurinol 100 milliGRAM(s) Oral daily  apixaban 5 milliGRAM(s) Oral every 12 hours  atorvastatin 40 milliGRAM(s) Oral at bedtime  chlorhexidine 2% Cloths 1 Application(s) Topical <User Schedule>  levoFLOXacin  Tablet 250 milliGRAM(s) Oral every 24 hours  metoprolol succinate ER 25 milliGRAM(s) Oral daily  sodium chloride 0.9%. 1000 milliLiter(s) (75 mL/Hr) IV Continuous <Continuous>    MEDICATIONS  (PRN):  acetaminophen     Tablet .. 650 milliGRAM(s) Oral every 6 hours PRN Temp greater or equal to 38C (100.4F), Mild Pain (1 - 3)  aluminum hydroxide/magnesium hydroxide/simethicone Suspension 30 milliLiter(s) Oral every 4 hours PRN Dyspepsia  melatonin 3 milliGRAM(s) Oral at bedtime PRN Insomnia  ondansetron Injectable 4 milliGRAM(s) IV Push every 8 hours PRN Nausea and/or Vomiting

## 2023-06-22 NOTE — DIETITIAN INITIAL EVALUATION ADULT - REASON
Unable to perform Nutrition Focused Physical Assessment in current setting; Pt asleep during time of visit. RD will reassess as appropriate.

## 2023-06-22 NOTE — PROGRESS NOTE ADULT - SUBJECTIVE AND OBJECTIVE BOX
DATE OF SERVICE: 06-22-23 @ 07:00  --------------------------------------------------------------  Pio Allison MD  PGY-1, Internal Medicine  Pager #: [phone], [facility] [phone]  After 7 PM: Night Float Pager  --------------------------------------------------------------      Patient is a 73y old  Male who presents with a chief complaint of hypotension (21 Jun 2023 06:52)      SUBJECTIVE / OVERNIGHT EVENTS:  No acute events overnight, patient reports feeling well and all questions answered at this time.     Additional Review of Systems:  Denies any other acute sx including f/c, HA, cough, sore throat, eye/ear changes, rhinorrhea, CP, palpitations, SOB, n/v, abdominal pain, back pain, bowel/bladder changes, fatigue, numbness or tingling.    MEDICATIONS  (STANDING):  acyclovir   Oral Tab/Cap 400 milliGRAM(s) Oral two times a day  allopurinol 100 milliGRAM(s) Oral daily  apixaban 5 milliGRAM(s) Oral every 12 hours  atorvastatin 40 milliGRAM(s) Oral at bedtime  chlorhexidine 2% Cloths 1 Application(s) Topical <User Schedule>  levoFLOXacin  Tablet 250 milliGRAM(s) Oral every 24 hours  sodium chloride 0.9%. 1000 milliLiter(s) (75 mL/Hr) IV Continuous <Continuous>    MEDICATIONS  (PRN):  acetaminophen     Tablet .. 650 milliGRAM(s) Oral every 6 hours PRN Temp greater or equal to 38C (100.4F), Mild Pain (1 - 3)  aluminum hydroxide/magnesium hydroxide/simethicone Suspension 30 milliLiter(s) Oral every 4 hours PRN Dyspepsia  melatonin 3 milliGRAM(s) Oral at bedtime PRN Insomnia  ondansetron Injectable 4 milliGRAM(s) IV Push every 8 hours PRN Nausea and/or Vomiting      Vital Signs Last 24 Hrs  T(C): 36.9 (22 Jun 2023 05:05), Max: 36.9 (22 Jun 2023 05:05)  T(F): 98.4 (22 Jun 2023 05:05), Max: 98.4 (22 Jun 2023 05:05)  HR: 88 (22 Jun 2023 05:05) (86 - 102)  BP: 154/81 (22 Jun 2023 05:05) (106/59 - 154/81)  BP(mean): --  RR: 18 (22 Jun 2023 05:05) (18 - 18)  SpO2: 99% (22 Jun 2023 05:05) (99% - 99%)    Parameters below as of 22 Jun 2023 05:05  Patient On (Oxygen Delivery Method): room air      CAPILLARY BLOOD GLUCOSE        I&O's Summary    21 Jun 2023 07:01  -  22 Jun 2023 07:00  --------------------------------------------------------  IN: 480 mL / OUT: 0 mL / NET: 480 mL        PHYSICAL EXAM:  GENERAL: Clinically well-appearing and comfortable  HEAD:  Atraumatic, Normocephalic  EYES: EOMI, PERRL, conjunctiva and sclera clear  NECK: Supple, No JVD  CHEST/LUNG: Clear to auscultation bilaterally; No wheeze  HEART: Regular rate and rhythm; No murmurs, rubs, or gallops  ABDOMEN: Soft, Nontender, Nondistended; Bowel sounds present  EXTREMITIES:  2+ Peripheral Pulses, No clubbing, cyanosis, or edema  PSYCH: Normal mood, Normal affect  NEUROLOGY: non-focal, moving all four extremities  SKIN: No rashes or lesions    LABS:                        9.3    1.58  )-----------( 185      ( 21 Jun 2023 07:09 )             28.8     06-21    144  |  106  |  47<H>  ----------------------------<  79  3.6   |  25  |  1.69<H>    Ca    9.4      21 Jun 2023 07:08  Phos  3.2     06-21  Mg     1.7     06-21    TPro  7.1  /  Alb  3.9  /  TBili  0.4  /  DBili  x   /  AST  12  /  ALT  7<L>  /  AlkPhos  111  06-21          Urinalysis Basic - ( 21 Jun 2023 07:08 )    Color: x / Appearance: x / SG: x / pH: x  Gluc: 79 mg/dL / Ketone: x  / Bili: x / Urobili: x   Blood: x / Protein: x / Nitrite: x   Leuk Esterase: x / RBC: x / WBC x   Sq Epi: x / Non Sq Epi: x / Bacteria: x    1/2+ BCx for GPC        RADIOLOGY & ADDITIONAL TESTS:    Imaging Personally Reviewed:    Consultant(s) Notes Reviewed:      Care Discussed with Consultants/Other Providers:   DATE OF SERVICE: 06-22-23 @ 07:00  --------------------------------------------------------------  Pio Allison MD  PGY-1, Internal Medicine  Pager #: [phone], [facility] [phone]  After 7 PM: Night Float Pager  --------------------------------------------------------------      Patient is a 73y old  Male who presents with a chief complaint of hypotension (21 Jun 2023 06:52)      SUBJECTIVE / OVERNIGHT EVENTS:  No acute events overnight, patient reports feeling well and all questions answered at this time.     Additional Review of Systems:  Denies any other acute sx, limited 2/2 AMS    MEDICATIONS  (STANDING):  acyclovir   Oral Tab/Cap 400 milliGRAM(s) Oral two times a day  allopurinol 100 milliGRAM(s) Oral daily  apixaban 5 milliGRAM(s) Oral every 12 hours  atorvastatin 40 milliGRAM(s) Oral at bedtime  chlorhexidine 2% Cloths 1 Application(s) Topical <User Schedule>  levoFLOXacin  Tablet 250 milliGRAM(s) Oral every 24 hours  sodium chloride 0.9%. 1000 milliLiter(s) (75 mL/Hr) IV Continuous <Continuous>    MEDICATIONS  (PRN):  acetaminophen     Tablet .. 650 milliGRAM(s) Oral every 6 hours PRN Temp greater or equal to 38C (100.4F), Mild Pain (1 - 3)  aluminum hydroxide/magnesium hydroxide/simethicone Suspension 30 milliLiter(s) Oral every 4 hours PRN Dyspepsia  melatonin 3 milliGRAM(s) Oral at bedtime PRN Insomnia  ondansetron Injectable 4 milliGRAM(s) IV Push every 8 hours PRN Nausea and/or Vomiting      Vital Signs Last 24 Hrs  T(C): 36.9 (22 Jun 2023 05:05), Max: 36.9 (22 Jun 2023 05:05)  T(F): 98.4 (22 Jun 2023 05:05), Max: 98.4 (22 Jun 2023 05:05)  HR: 88 (22 Jun 2023 05:05) (86 - 102)  BP: 154/81 (22 Jun 2023 05:05) (106/59 - 154/81)  BP(mean): --  RR: 18 (22 Jun 2023 05:05) (18 - 18)  SpO2: 99% (22 Jun 2023 05:05) (99% - 99%)    Parameters below as of 22 Jun 2023 05:05  Patient On (Oxygen Delivery Method): room air      CAPILLARY BLOOD GLUCOSE        I&O's Summary    21 Jun 2023 07:01  -  22 Jun 2023 07:00  --------------------------------------------------------  IN: 480 mL / OUT: 0 mL / NET: 480 mL    PHYSICAL EXAM:  GENERAL: Clinically well-appearing and comfortable  HEAD:  Atraumatic, Normocephalic  EYES: EOMI, PERRL, conjunctiva and sclera clear  NECK: Supple, No JVD  CHEST/LUNG: Clear to auscultation bilaterally; No wheeze  HEART: Regular rate and rhythm; No murmurs, rubs, or gallops  ABDOMEN: Soft, Nontender, Nondistended; Bowel sounds present  EXTREMITIES:  2+ Peripheral Pulses, No clubbing, cyanosis, or edema  NEUROLOGY: Pleasantly severely confused, non-focal, ambulatory without assistance  SKIN: No rashes or lesions    LABS:                        9.3    1.58  )-----------( 185      ( 21 Jun 2023 07:09 )             28.8     06-21    144  |  106  |  47<H>  ----------------------------<  79  3.6   |  25  |  1.69<H>    Ca    9.4      21 Jun 2023 07:08  Phos  3.2     06-21  Mg     1.7     06-21    TPro  7.1  /  Alb  3.9  /  TBili  0.4  /  DBili  x   /  AST  12  /  ALT  7<L>  /  AlkPhos  111  06-21          Urinalysis Basic - ( 21 Jun 2023 07:08 )    Color: x / Appearance: x / SG: x / pH: x  Gluc: 79 mg/dL / Ketone: x  / Bili: x / Urobili: x   Blood: x / Protein: x / Nitrite: x   Leuk Esterase: x / RBC: x / WBC x   Sq Epi: x / Non Sq Epi: x / Bacteria: x    1/4+ BCx for MRSE        RADIOLOGY & ADDITIONAL TESTS:    Imaging Personally Reviewed:    Consultant(s) Notes Reviewed:      Care Discussed with Consultants/Other Providers:

## 2023-06-22 NOTE — PROGRESS NOTE ADULT - PROBLEM SELECTOR PLAN 3
- for agitation, can continue with low-dose ativan and zyprexa PRN  - 1:1 removed 6/20, c/w enhanced observation  - per OP geriatric team, pt with recent MoCA testing 9/30 indicative of severe cognitive impairment; lives alone and has not been receptive to home care  - does not have capacity to leave AMA, HCP is daughter Vonnie - on Cycle 3, Day 12 of lenalidomide & tafasitamib  - will continue to be monitored by hem/onc while inpatient here daily  - per Dr. Cordova (outpatient oncologist) patient should continue Tafasitamab 12 mg/kg every 2 weeks once cleared of an acute process  - HOLD Revlimid until neutrophil count recovers, then can resume with dose changed to 10 mg x21 days and CBC with every treatment visit to monitor for neutropenia  - Patient should be continued on Apixaban for VTE ppx and hx of Atrial flutter  - Continue with Acyclovir 400 mg bid and allopurinol prophylactically, as well as levaquin 500 mg qd prophylaxis

## 2023-06-22 NOTE — PROGRESS NOTE ADULT - PROBLEM SELECTOR PLAN 8
- patient endorsed some left hand pain/swelling on outpatient  - had Xray of left hand performed on date of admission, 6/20, negative for any fracture, or dislocation  - Advanced first [facility] joint arthrosis noted, vascular calcifications noted DVT Prophylaxis: on Apixaban 5 mg bid  Dispo: Pending course, patient does not have capacity; HCP: daughter Vonnie  Code: Full

## 2023-06-22 NOTE — DIETITIAN INITIAL EVALUATION ADULT - ADD RECOMMEND
1) Continue current diet free of therapeutic restrictions as tolerated.           - Recommend SLP evaluation to determine appropriate texture/consistency diet. Defer texture/consistency to SLP/team.   2) Recommend providing Ensure Enlive 2x/day to optimize protein-energy intake.   3) Recommend Multivitamin daily pending no medical contraindications.  4) Continue to monitor PO intake, weight, labs, skin, GI status, and diet.  5) Nutrition care plan to remain consistent with pt GOC.  6) RD to follow up per protocol / to obtain subjective information as able. RD remains available per request / PRN.

## 2023-06-22 NOTE — DIETITIAN INITIAL EVALUATION ADULT - REASON INDICATOR FOR ASSESSMENT
Nutrition Consult for MST score 2 or >.   Source: Team, Previous RD notes, Electronic Medical Record. Pt asleep and not responsive to verbal stimuli; pt confused/disoriented per chart.   Chart reviewed, events noted.

## 2023-06-22 NOTE — DIETITIAN INITIAL EVALUATION ADULT - NSICDXPASTMEDICALHX_GEN_ALL_CORE_FT
PAST MEDICAL HISTORY:  Atrial flutter, unspecified type     DM type 2 (diabetes mellitus, type 2) Never on insulin    Essential hypertension     Impaired memory     Moderate mitral regurgitation     Non-Hodgkins Lymphoma In [facility] for > 10 years now with relapse    Pleural effusion     Rhinitis, allergic     Systolic heart failure

## 2023-06-22 NOTE — PROGRESS NOTE ADULT - PROBLEM SELECTOR PLAN 4
- presenting with elevated creatinine 3.16 compared to baseline creatinine 2.00, eGFR 20  - US Kidney & Bladder unremarkable outside of mild fullness in left renal collecting system. Negative for hydronephrosis or renal masses.  - Bladder scan post void on admission showed 347 cc, mild retention  - pt asymptomatic however, with no complaints of suprapubic fulness, change in urinary frequency or dysuria/hematuria   - Patient also found to have incidental cholelithiasis without evidence of acute cholecystitis on US of Kidney & Bladder  - Lenoflamide (patient's outpatient chemotherapy) can be associated with STEPHEN  - keep off nephrotoxins if possible while inpatient  - trend CMP daily  IMPROVED

## 2023-06-22 NOTE — DISCHARGE NOTE NURSING/CASE MANAGEMENT/SOCIAL WORK - PATIENT PORTAL LINK FT
You can access the FollowMyHealth Patient Portal offered by [facility] by registering at the following website: [email or web address]. By joining [facility]’s FollowMyHealth portal, you will also be able to view your health information using other applications (apps) compatible with our system.

## 2023-06-23 ENCOUNTER — TRANSCRIPTION ENCOUNTER (OUTPATIENT)
Age: 73
End: 2023-06-23

## 2023-06-23 VITALS
RESPIRATION RATE: 18 BRPM | SYSTOLIC BLOOD PRESSURE: 157 MMHG | OXYGEN SATURATION: 97 % | HEART RATE: 78 BPM | TEMPERATURE: 98 F | DIASTOLIC BLOOD PRESSURE: 64 MMHG

## 2023-06-23 LAB
ANION GAP SERPL CALC-SCNC: 13 MMOL/L — SIGNIFICANT CHANGE UP (ref 5–17)
BASOPHILS # BLD AUTO: 0.1 K/UL — SIGNIFICANT CHANGE UP (ref 0–0.2)
BASOPHILS NFR BLD AUTO: 2.5 % — HIGH (ref 0–2)
BUN SERPL-MCNC: 30 MG/DL — HIGH (ref 7–23)
CALCIUM SERPL-MCNC: 9.8 MG/DL — SIGNIFICANT CHANGE UP (ref 8.4–10.5)
CHLORIDE SERPL-SCNC: 105 MMOL/L — SIGNIFICANT CHANGE UP (ref 96–108)
CO2 SERPL-SCNC: 26 MMOL/L — SIGNIFICANT CHANGE UP (ref 22–31)
CREAT SERPL-MCNC: 1.4 MG/DL — HIGH (ref 0.5–1.3)
EGFR: 53 ML/MIN/1.73M2 — LOW
EOSINOPHIL # BLD AUTO: 0.12 K/UL — SIGNIFICANT CHANGE UP (ref 0–0.5)
EOSINOPHIL NFR BLD AUTO: 3 % — SIGNIFICANT CHANGE UP (ref 0–6)
GLUCOSE SERPL-MCNC: 86 MG/DL — SIGNIFICANT CHANGE UP (ref 70–99)
HCT VFR BLD CALC: 27.1 % — LOW (ref 39–50)
HGB BLD-MCNC: 8.9 G/DL — LOW (ref 13–17)
IMM GRANULOCYTES NFR BLD AUTO: 2 % — HIGH (ref 0–0.9)
LYMPHOCYTES # BLD AUTO: 0.52 K/UL — LOW (ref 1–3.3)
LYMPHOCYTES # BLD AUTO: 12.8 % — LOW (ref 13–44)
MAGNESIUM SERPL-MCNC: 1.5 MG/DL — LOW (ref 1.6–2.6)
MCHC RBC-ENTMCNC: 31.7 PG — SIGNIFICANT CHANGE UP (ref 27–34)
MCHC RBC-ENTMCNC: 32.8 GM/DL — SIGNIFICANT CHANGE UP (ref 32–36)
MCV RBC AUTO: 96.4 FL — SIGNIFICANT CHANGE UP (ref 80–100)
MONOCYTES # BLD AUTO: 0.48 K/UL — SIGNIFICANT CHANGE UP (ref 0–0.9)
MONOCYTES NFR BLD AUTO: 11.9 % — SIGNIFICANT CHANGE UP (ref 2–14)
NEUTROPHILS # BLD AUTO: 2.75 K/UL — SIGNIFICANT CHANGE UP (ref 1.8–7.4)
NEUTROPHILS NFR BLD AUTO: 67.8 % — SIGNIFICANT CHANGE UP (ref 43–77)
NRBC # BLD: 0 /100 WBCS — SIGNIFICANT CHANGE UP (ref 0–0)
PHOSPHATE SERPL-MCNC: 3.4 MG/DL — SIGNIFICANT CHANGE UP (ref 2.5–4.5)
PLATELET # BLD AUTO: 174 K/UL — SIGNIFICANT CHANGE UP (ref 150–400)
POTASSIUM SERPL-MCNC: 3.6 MMOL/L — SIGNIFICANT CHANGE UP (ref 3.5–5.3)
POTASSIUM SERPL-SCNC: 3.6 MMOL/L — SIGNIFICANT CHANGE UP (ref 3.5–5.3)
RBC # BLD: 2.81 M/UL — LOW (ref 4.2–5.8)
RBC # FLD: 15.5 % — HIGH (ref 10.3–14.5)
SODIUM SERPL-SCNC: 144 MMOL/L — SIGNIFICANT CHANGE UP (ref 135–145)
WBC # BLD: 4.05 K/UL — SIGNIFICANT CHANGE UP (ref 3.8–10.5)
WBC # FLD AUTO: 4.05 K/UL — SIGNIFICANT CHANGE UP (ref 3.8–10.5)

## 2023-06-23 PROCEDURE — 99233 SBSQ HOSP IP/OBS HIGH 50: CPT | Mod: GC

## 2023-06-23 PROCEDURE — 99238 HOSP IP/OBS DSCHRG MGMT 30/<: CPT | Mod: GC

## 2023-06-23 PROCEDURE — 99232 SBSQ HOSP IP/OBS MODERATE 35: CPT

## 2023-06-23 RX ORDER — MAGNESIUM SULFATE 500 MG/ML
1 VIAL (ML) INJECTION ONCE
Refills: 0 | Status: COMPLETED | OUTPATIENT
Start: 2023-06-23 | End: 2023-06-23

## 2023-06-23 RX ORDER — METOPROLOL TARTRATE 50 MG
1 TABLET ORAL
Qty: 30 | Refills: 0
Start: 2023-06-23 | End: 2023-07-22

## 2023-06-23 RX ORDER — LEVOFLOXACIN 5 MG/ML
1 INJECTION, SOLUTION INTRAVENOUS
Qty: 30 | Refills: 0
Start: 2023-06-23 | End: 2023-07-22

## 2023-06-23 RX ADMIN — Medication 25 MILLIGRAM(S): at 05:28

## 2023-06-23 RX ADMIN — APIXABAN 5 MILLIGRAM(S): 2.5 TABLET, FILM COATED ORAL at 17:00

## 2023-06-23 RX ADMIN — APIXABAN 5 MILLIGRAM(S): 2.5 TABLET, FILM COATED ORAL at 05:28

## 2023-06-23 RX ADMIN — Medication 100 GRAM(S): at 10:38

## 2023-06-23 RX ADMIN — Medication 100 MILLIGRAM(S): at 12:52

## 2023-06-23 RX ADMIN — Medication 400 MILLIGRAM(S): at 05:28

## 2023-06-23 RX ADMIN — Medication 400 MILLIGRAM(S): at 17:01

## 2023-06-23 RX ADMIN — CHLORHEXIDINE GLUCONATE 1 APPLICATION(S): 213 SOLUTION TOPICAL at 05:29

## 2023-06-23 NOTE — PROGRESS NOTE ADULT - PROBLEM SELECTOR PLAN 2
- per outpatient records, patient on date of admission had hypotension 86/58, at [facility] and encouraged by Dr. Jason Cordova (oncologist ) to present to ED for further workup  - while here patient has had episodes of confusion  - however, BP has remained improved 146/75, 118/74, afebrile, VSS  - CBC significant for low WBC count and low neutrophils, iso of chemotherapy  - RVP negative on admission  - UA negative  - CXR Unremarkable on admission, no signs of pneumothorax, consolidation, pna, or atelectasis  - US Kidney & Bladder: Revealed mild fullness in left renal collecting system, no hydronephrosis  - Ur Cx NGTD  - Blood cultures 1/4+ for MRSE 6/21, will recheck but likely contaminant and should not preclude d/c  - Will continue on home levaquin prophylactically, renally dosed  IMPROVED

## 2023-06-23 NOTE — PROGRESS NOTE ADULT - PROBLEM SELECTOR PLAN 7
- c/w home atorvastatin 40 mg qhs

## 2023-06-23 NOTE — DISCHARGE NOTE PROVIDER - NSDCCPCAREPLAN_GEN_ALL_CORE_FT
PRINCIPAL DISCHARGE DIAGNOSIS  Diagnosis: Hypotension  Assessment and Plan of Treatment: You presented with low blood pressure which could stem from an infection or perhaps dehydration from not taking in enough fluid. Your blood pressure improved after arrival to the hospital and remained stable. We saw an organism in your blood in one of the four blood culture bottles we checked, and this was likely a contaminant from your skin. You continued to be stable and free of infection clinically. We sent for repeat cultures to be followed by your primary care doctor to ensure there is no infection.      SECONDARY DISCHARGE DIAGNOSES  Diagnosis: STEPHEN (acute kidney injury)  Assessment and Plan of Treatment: You presented with injury to your kidney which could stem from both low blood pressure and dehydration. You were monitored on labs and improved overtime after receiving fluids. Please continue to follow-up with your doctors.

## 2023-06-23 NOTE — DISCHARGE NOTE PROVIDER - NSDCFUSCHEDAPPT_GEN_ALL_CORE_FT
[facility]  [facility]  Scheduled Appointment: 06/24/2023    [facility]  [facility]  Scheduled Appointment: 06/24/2023    [facility]  [facility]  Scheduled Appointment: 07/08/2023    [facility]  [facility]  Scheduled Appointment: 07/08/2023    [facility]  [facility]  Scheduled Appointment: 07/22/2023    [facility]  [facility]  Scheduled Appointment: 07/22/2023    [facility]  GERIATRICS [address] [place] [address]   Scheduled Appointment: 08/15/2023    [facility]  ELECTROPH [address]  Scheduled Appointment: 09/05/2023     [facility]  [facility]  Scheduled Appointment: 07/08/2023    [facility]  [facility]  Scheduled Appointment: 07/08/2023    [facility]  [facility]  Scheduled Appointment: 07/22/2023    [facility]  [facility]  Scheduled Appointment: 07/22/2023    [facility]  GERIATRICS [address]   Scheduled Appointment: 08/15/2023    [facility]  ELECTROPH [address]  Scheduled Appointment: 09/05/2023

## 2023-06-23 NOTE — PROGRESS NOTE ADULT - PROBLEM SELECTOR PLAN 3
- on Cycle 3, Day 12 of lenalidomide & tafasitamib  - will continue to be monitored by hem/onc while inpatient here daily  - per Dr. Cordova (outpatient oncologist) patient should continue Tafasitamab 12 mg/kg every 2 weeks once cleared of an acute process  - HOLD Revlimid until neutrophil count recovers, then can resume with dose changed to 10 mg x21 days and CBC with every treatment visit to monitor for neutropenia  - Patient should be continued on Apixaban for VTE ppx and hx of Atrial flutter  - Continue with Acyclovir 400 mg bid and allopurinol prophylactically, as well as levaquin 500 mg qd prophylaxis

## 2023-06-23 NOTE — DISCHARGE NOTE PROVIDER - NSDCMRMEDTOKEN_GEN_ALL_CORE_FT
allopurinol 100 mg oral tablet: 1 tab(s) orally once a day  apixaban 5 mg oral tablet: 1 tab(s) orally every 12 hours  atorvastatin 40 mg oral tablet: 1 tab(s) orally once a day (at bedtime)  metoprolol succinate 25 mg oral tablet, extended release: 1 tab(s) orally once a day Hold for blood pressure &lt;110, HR &lt;60   allopurinol 100 mg oral tablet: 1 tab(s) orally once a day  apixaban 5 mg oral tablet: 1 tab(s) orally every 12 hours  atorvastatin 40 mg oral tablet: 1 tab(s) orally once a day (at bedtime)  levoFLOXacin 250 mg oral tablet: 1 tab(s) orally every 24 hours  metoprolol succinate 25 mg oral tablet, extended release: 1 tab(s) orally once a day Hold for blood pressure &lt;110, HR &lt;60   allopurinol 100 mg oral tablet: 1 tab(s) orally once a day  apixaban 5 mg oral tablet: 1 tab(s) orally every 12 hours  atorvastatin 40 mg oral tablet: 1 tab(s) orally once a day (at bedtime)  levoFLOXacin 250 mg oral tablet: 1 tab(s) orally every 24 hours  metoprolol succinate 25 mg oral tablet, extended release: 1 tab(s) orally once a day Hold for blood pressure &lt;110, HR &lt;60  Outpatient physical therapy: Outpatient physical therapy

## 2023-06-23 NOTE — DISCHARGE NOTE PROVIDER - NSDCFUADDAPPT_GEN_ALL_CORE_FT
APPTS ARE READY TO BE MADE: [x] YES    Best Family or Patient Contact (if needed):  Vonnie Ellison (HCP)    Additional Information about above appointments (if needed):    1: PCP follow-up ([facility])  2:   3:     Other comments or requests:    APPTS ARE READY TO BE MADE: [x] YES    Best Family or Patient Contact (if needed):  DaughterVonnie (HCP)    Additional Information about above appointments (if needed):    1: PCP follow-up ([facility])  2:   3:     Other comments or requests:   Patient declined to verify .

## 2023-06-23 NOTE — PROGRESS NOTE ADULT - PROBLEM SELECTOR PLAN 1
- for agitation, can continue with low-dose ativan and zyprexa PRN  - 1:1 removed 6/20, c/w enhanced observation  - per OP geriatric team, pt with recent MoCA testing 9/30 indicative of severe cognitive impairment; lives alone and has not been receptive to home care; 6/22 patient expresses willingness to accept assistance  ==consult SW regarding home assessment  - does not have capacity to leave AMA, HCP is daughter Vonnie - for agitation, can continue with low-dose ativan and zyprexa PRN  - 1:1 removed 6/20, c/w enhanced observation  - per OP geriatric team, pt with recent MoCA testing 9/30 indicative of severe cognitive impairment; lives alone and has not been receptive to home care; 6/22 patient expresses willingness to accept assistance  - deemed safe home environment per SW  - does not have capacity to leave AMA, HCP is daughter Vonnie

## 2023-06-23 NOTE — DISCHARGE NOTE PROVIDER - HOSPITAL COURSE
73-year-old male with a past medical history of atrial flutter (on apixaban), HTN, mild dementia, HLD, follicular lymphoma with DLBCL (Tx with R–CHOP in 2003 with relapse in 2009, treated with BR), grade IIIA s/p 3 cyles of obinutuzumab which was discontinued due to intolerability, now on second line therapy with tafasitamab plus lenalidomide currently on cycle 3-day 12 of treatment, on PPx acyclovir and Levaquin, who presents to the ED for low BP today at routine follow-up at [facility], Patient was hypotensive to 86/58 (80s/60s). Patient follows with Oncologist: Dr. Jason Cordova at [facility].     At his outpatient appointment on date of admission with Dr. Cordova, he was found to have new low BP (86/58) so was recommended to go to ED for further workup. As per Dr. Cordova's note on AllScripts, patient has had longstanding suspected dementia >7 mon of aggressive and confusion episodes. No evidence of CNS lymphoma involvement at present. Patient now on 2nd line therapy with Tafasitamab (Anti-cd19) and Lenalidomide  since 3/30/23, today is Cycle 3 Day 12 of Tafasitamab and lenalidomide. Per AllScripts note daughter was endorsing patient had some sensation of subjective chills and sore throat, however patient denied this to health care provider and myself on admission.    In the ED, patient was agitated and threatening to punch staff and given 2 mg Ativan and placed on 1:1 observation initially. Now on reexamination 1 hour later patient appears much more calm and collected, AAOx2 and pleasant, conversational. Patient is in no acute distress with no complaints other than feeling mildly chilly. He denied sore throat, cough, chest pain, SOB. Patient also did endorse that he has episodes of confusion at home at baseline especially at night from time to time. Currently denied any anxiety or confusion or hallucinations auditory or visual with me.    Patient also had some left hand pain and swelling so Xray was obtained 3 views of left hand, which did not show any sign of fracture or dislocation, and patient reports a remote injury 2 months ago.    In the hospital, patient was monitored on tele and labs with stability of his blood pressure, remaining well-appearing and clear of infectious signs. Patient received filgrastim which corrected his neutropenia, and he was maintained on his prophylactic regimen. Hematology followed while inpatient. He did have transient episodes of tachycardia while ambulating and a brief episode of MAT which improved after metoprolol 25 mg the day prior to discharge without further hypotension. Patient did have 1/4 Bl Cx positive for MRSE, likely a contaminant, and repeat cultures were collected, to be followed-up as outpatient. Patient had no further episodes of agitation and required no further benzodiazepines.    F/u  1) PCP f/u including Blood culture f/u and BP recheck and dementia progression  2) Continued f/u with hematology 73-year-old male with a past medical history of atrial flutter (on apixaban), HTN, mild dementia, HLD, follicular lymphoma with DLBCL (Tx with R–CHOP in 2003 with relapse in 2009, treated with BR), grade IIIA s/p 3 cyles of obinutuzumab which was discontinued due to intolerability, now on second line therapy with tafasitamab plus lenalidomide currently on cycle 3-day 12 of treatment, on PPx acyclovir and Levaquin, who presents to the ED for low BP today at routine follow-up at [facility], Patient was hypotensive to 86/58 (80s/60s). Patient follows with Oncologist: Dr. Jason Cordova at [facility].     At his outpatient appointment on date of admission with Dr. Cordova, he was found to have new low BP (86/58) so was recommended to go to ED for further workup. As per Dr. Cordova's note on AllScripts, patient has had longstanding suspected dementia >7 mon of aggressive and confusion episodes. No evidence of CNS lymphoma involvement at present. Patient now on 2nd line therapy with Tafasitamab (Anti-cd19) and Lenalidomide  since 3/30/23, today is Cycle 3 Day 12 of Tafasitamab and lenalidomide. Per AllScripts note daughter was endorsing patient had some sensation of subjective chills and sore throat, however patient denied this to health care provider and myself on admission.    In the ED, patient was agitated and threatening to punch staff and given 2 mg Ativan and placed on 1:1 observation initially. Now on reexamination 1 hour later patient appears much more calm and collected, AAOx2 and pleasant, conversational. Patient is in no acute distress with no complaints other than feeling mildly chilly. He denied sore throat, cough, chest pain, SOB. Patient also did endorse that he has episodes of confusion at home at baseline especially at night from time to time. Currently denied any anxiety or confusion or hallucinations auditory or visual with me.    Patient also had some left hand pain and swelling so Xray was obtained 3 views of left hand, which did not show any sign of fracture or dislocation, and patient reports a remote injury 2 months ago.    In the hospital, patient was monitored on tele and labs with stability of his blood pressure, remaining well-appearing and clear of infectious signs. Patient received filgrastim which corrected his neutropenia, and he was maintained on his prophylactic regimen. Hematology followed while inpatient. He did have transient episodes of tachycardia while ambulating and a brief episode of MAT which improved after metoprolol 25 mg the day prior to discharge without further hypotension. Patient did have 1/4 Bl Cx positive for MRSE, likely a contaminant, and repeat cultures were collected, to be followed-up as outpatient. Patient had no further episodes of agitation and required no further benzodiazepines.    F/u  1) PCP f/u including Blood culture f/u and BP recheck, lab dementia progression  2) Continued f/u with hematology

## 2023-06-23 NOTE — DISCHARGE NOTE PROVIDER - NSDCCPTREATMENT_GEN_ALL_CORE_FT
PRINCIPAL PROCEDURE  Procedure: CXR, single AP view  Findings and Treatment: 6/19/23  FINDINGS:  Heart/Vascular: The cardiac silhouette is normal in size. Left chest wall   pacemaker and micra pacemaker. Right chest wall port catheter with tip   terminating in the distal SVC.  Pulmonary: No focal consolidation, pleural effusion or pneumothorax.  Bones: The osseous structures are unremarkable.  Impression:  Clear lungs.

## 2023-06-23 NOTE — PROGRESS NOTE ADULT - ATTENDING COMMENTS
discussed about plan with resident on rounds.  labs and imaging reviewed.  I agree with the above findings, assessment, and plan with the following additions and exceptions:  73F pmh atrial flutter (on apixaban), HTN, mild dementia, HLD, follicular lymphoma with DLBCL (Tx with R–CHOP in 2003 with relapse in 2009, treated with BR), grade IIIA s/p 3 cycles of obinutuzumab (d/cd for intolerability), now on second line therapy with tafasitamab plus lenalidomide currently on cycle 3-day 12 of treatment, presents to the ED for low BP today at routine follow-up at [facility], course c/b possible progression of dementia.     # Hypotension   -Sent from [facility] for episode of hypotension (BP 80s/50s)  -Since presenting to ED BP has been wnl, S/p 1L NS bolus   - Clinically nontoxic, afebrile, VSS   - Pt immunocompromised, on chemo & currently w/leukopenia high risk of decompensation   - admission BCx showing 1/4 bottle MRSE. suspect contaminant given lack of any clinical s+s of infection. will repeat and f/u  - CXR clear, RVP/COVID neg, UA: noninfectious, Ucx.    - Trend labs, fever curve, monitor clinically  - C/w home med acyclovir & Levaquin for Ppx       #STEPHEN:  - Cr 3.16 on admission (bL ~ 1-2) on admission. improved s/p IVF.   - Suspect Pre-renal azotemia from hypovolemia + ?Med SE from chemo meds    - IVF, trend labs, avoid nephrotoxin      # Lymphoma:   current treatment history as stated above.   current presentation with AMS, STEPHEN, hypotension - ? related to adverse reaction to chemo.   Will follow up with heme     #Dementia  - Baseline dementia w/ periods of increasing agitation and behavior   - Had episode of increased agitation requiring Ativan in ED, now much improved   - Off 1:1 observation - though confused, the patient is calm and cooperative    - Frequent re-orientation, fall precautions    d/w SW. Pt remains independent of ADLs and has good family support near. Daughter lives nearby and have been supportive. no skilled PT needs  medically cleared for discahrge   D/c home   d.c time 40 mins

## 2023-06-23 NOTE — DISCHARGE NOTE PROVIDER - NSFOLLOWUPCLINICS_GEN_ALL_ED_FT
[facility]  Geriatrics  [address]  [place] [address]  Phone: [phone]  Fax:   Established Patient  Follow Up Time: 1 week

## 2023-06-23 NOTE — PROGRESS NOTE ADULT - SUBJECTIVE AND OBJECTIVE BOX
DATE OF SERVICE: 06-23-23 @ 06:50  --------------------------------------------------------------  Pio Allison MD  PGY-1, Internal Medicine  Pager #: [phone], [facility] [phone]  After 7 PM: Night Float Pager  --------------------------------------------------------------      Patient is a 73y old  Male who presents with a chief complaint of Acute renal failure    Per chart: 73-year-old male with a past medical history of atrial flutter (on apixaban), HTN, mild dementia, HLD, follicular lymphoma with DLBCL (Tx with R–CHOP in 2003 with relapse in 2009, treated with BR), grade IIIA s/p 3 cyles of obinutuzumab which was discontinued due to intolerability, now on second line therapy with tafasitamab plus lenalidomide currently on cycle 3-day 12 of treatment, on PPx acyclovir and Levaquin, who presents to the ED for low BP today at routine follow-up at [facility].  (22 Jun 2023 14:19)      SUBJECTIVE / OVERNIGHT EVENTS:  No acute events overnight, patient reports feeling well and all questions answered at this time.     Additional Review of Systems:  Denies any other acute sx including f/c, HA, cough, sore throat, eye/ear changes, rhinorrhea, CP, palpitations, SOB, n/v, abdominal pain, back pain, bowel/bladder changes, fatigue, numbness or tingling.    MEDICATIONS  (STANDING):  acyclovir   Oral Tab/Cap 400 milliGRAM(s) Oral two times a day  allopurinol 100 milliGRAM(s) Oral daily  apixaban 5 milliGRAM(s) Oral every 12 hours  atorvastatin 40 milliGRAM(s) Oral at bedtime  chlorhexidine 2% Cloths 1 Application(s) Topical <User Schedule>  levoFLOXacin  Tablet 250 milliGRAM(s) Oral every 24 hours  metoprolol succinate ER 25 milliGRAM(s) Oral daily  sodium chloride 0.9%. 1000 milliLiter(s) (75 mL/Hr) IV Continuous <Continuous>    MEDICATIONS  (PRN):  acetaminophen     Tablet .. 650 milliGRAM(s) Oral every 6 hours PRN Temp greater or equal to 38C (100.4F), Mild Pain (1 - 3)  aluminum hydroxide/magnesium hydroxide/simethicone Suspension 30 milliLiter(s) Oral every 4 hours PRN Dyspepsia  melatonin 3 milliGRAM(s) Oral at bedtime PRN Insomnia  ondansetron Injectable 4 milliGRAM(s) IV Push every 8 hours PRN Nausea and/or Vomiting      Vital Signs Last 24 Hrs  T(C): 36.8 (23 Jun 2023 05:00), Max: 37.2 (22 Jun 2023 20:57)  T(F): 98.3 (23 Jun 2023 05:00), Max: 99 (22 Jun 2023 20:57)  HR: 65 (23 Jun 2023 05:00) (65 - 89)  BP: 128/65 (23 Jun 2023 05:00) (128/65 - 156/72)  BP(mean): --  RR: 18 (23 Jun 2023 05:00) (18 - 18)  SpO2: 98% (23 Jun 2023 05:00) (98% - 98%)    Parameters below as of 23 Jun 2023 05:00  Patient On (Oxygen Delivery Method): room air      CAPILLARY BLOOD GLUCOSE        I&O's Summary    21 Jun 2023 07:01  -  22 Jun 2023 07:00  --------------------------------------------------------  IN: 480 mL / OUT: 0 mL / NET: 480 mL    22 Jun 2023 07:01  -  23 Jun 2023 06:50  --------------------------------------------------------  IN: 480 mL / OUT: 0 mL / NET: 480 mL        PHYSICAL EXAM:  GENERAL: Clinically well-appearing and comfortable  HEAD:  Atraumatic, Normocephalic  EYES: EOMI, PERRL, conjunctiva and sclera clear  NECK: Supple, No JVD  CHEST/LUNG: Clear to auscultation bilaterally; No wheeze  HEART: Regular rate and rhythm; No murmurs, rubs, or gallops  ABDOMEN: Soft, Nontender, Nondistended; Bowel sounds present  EXTREMITIES:  2+ Peripheral Pulses, No clubbing, cyanosis, or edema  PSYCH: Normal mood, Normal affect  NEUROLOGY: non-focal, moving all four extremities  SKIN: No rashes or lesions    LABS:                        9.1    4.42  )-----------( 204      ( 22 Jun 2023 07:27 )             28.0     06-22    144  |  105  |  36<H>  ----------------------------<  78  3.9   |  25  |  1.51<H>    Ca    9.8      22 Jun 2023 07:25  Phos  2.8     06-22  Mg     1.6     06-22    TPro  7.1  /  Alb  4.0  /  TBili  0.5  /  DBili  x   /  AST  14  /  ALT  7<L>  /  AlkPhos  113  06-22          Urinalysis Basic - ( 22 Jun 2023 07:25 )    Color: x / Appearance: x / SG: x / pH: x  Gluc: 78 mg/dL / Ketone: x  / Bili: x / Urobili: x   Blood: x / Protein: x / Nitrite: x   Leuk Esterase: x / RBC: x / WBC x   Sq Epi: x / Non Sq Epi: x / Bacteria: x        RADIOLOGY & ADDITIONAL TESTS:    Imaging Personally Reviewed:    Consultant(s) Notes Reviewed:      Care Discussed with Consultants/Other Providers:   DATE OF SERVICE: 06-23-23 @ 06:50  --------------------------------------------------------------  Pio Allison MD  PGY-1, Internal Medicine  Pager #: [phone], [facility] [phone]  After 7 PM: Night Float Pager  --------------------------------------------------------------      Patient is a 73y old  Male who presents with a chief complaint of Acute renal failure    Per chart: 73-year-old male with a past medical history of atrial flutter (on apixaban), HTN, mild dementia, HLD, follicular lymphoma with DLBCL (Tx with R–CHOP in 2003 with relapse in 2009, treated with BR), grade IIIA s/p 3 cyles of obinutuzumab which was discontinued due to intolerability, now on second line therapy with tafasitamab plus lenalidomide currently on cycle 3-day 12 of treatment, on PPx acyclovir and Levaquin, who presents to the ED for low BP today at routine follow-up at [facility].  (22 Jun 2023 14:19)      SUBJECTIVE / OVERNIGHT EVENTS:  No acute events overnight, patient reports feeling well and all questions answered at this time.     Additional Review of Systems:  Limited 2/2 AMS    MEDICATIONS  (STANDING):  acyclovir   Oral Tab/Cap 400 milliGRAM(s) Oral two times a day  allopurinol 100 milliGRAM(s) Oral daily  apixaban 5 milliGRAM(s) Oral every 12 hours  atorvastatin 40 milliGRAM(s) Oral at bedtime  chlorhexidine 2% Cloths 1 Application(s) Topical <User Schedule>  levoFLOXacin  Tablet 250 milliGRAM(s) Oral every 24 hours  metoprolol succinate ER 25 milliGRAM(s) Oral daily  sodium chloride 0.9%. 1000 milliLiter(s) (75 mL/Hr) IV Continuous <Continuous>    MEDICATIONS  (PRN):  acetaminophen     Tablet .. 650 milliGRAM(s) Oral every 6 hours PRN Temp greater or equal to 38C (100.4F), Mild Pain (1 - 3)  aluminum hydroxide/magnesium hydroxide/simethicone Suspension 30 milliLiter(s) Oral every 4 hours PRN Dyspepsia  melatonin 3 milliGRAM(s) Oral at bedtime PRN Insomnia  ondansetron Injectable 4 milliGRAM(s) IV Push every 8 hours PRN Nausea and/or Vomiting      Vital Signs Last 24 Hrs  T(C): 36.8 (23 Jun 2023 05:00), Max: 37.2 (22 Jun 2023 20:57)  T(F): 98.3 (23 Jun 2023 05:00), Max: 99 (22 Jun 2023 20:57)  HR: 65 (23 Jun 2023 05:00) (65 - 89)  BP: 128/65 (23 Jun 2023 05:00) (128/65 - 156/72)  BP(mean): --  RR: 18 (23 Jun 2023 05:00) (18 - 18)  SpO2: 98% (23 Jun 2023 05:00) (98% - 98%)    Parameters below as of 23 Jun 2023 05:00  Patient On (Oxygen Delivery Method): room air      CAPILLARY BLOOD GLUCOSE        I&O's Summary    21 Jun 2023 07:01  -  22 Jun 2023 07:00  --------------------------------------------------------  IN: 480 mL / OUT: 0 mL / NET: 480 mL    22 Jun 2023 07:01  -  23 Jun 2023 06:50  --------------------------------------------------------  IN: 480 mL / OUT: 0 mL / NET: 480 mL      PHYSICAL EXAM:  GENERAL: Clinically well-appearing and comfortable  HEAD:  Atraumatic, Normocephalic  EYES: EOMI, PERRL, conjunctiva and sclera clear  NECK: Supple, No JVD  CHEST/LUNG: Clear to auscultation bilaterally; No wheeze  HEART: Regular rate and rhythm; No murmurs, rubs, or gallops  ABDOMEN: Soft, Nontender, Nondistended; Bowel sounds present  EXTREMITIES:  2+ Peripheral Pulses, No clubbing, cyanosis, or edema  NEUROLOGY: Pleasantly severely confused, non-focal, ambulatory without assistance  SKIN: No rashes or lesions      LABS:                        9.1    4.42  )-----------( 204      ( 22 Jun 2023 07:27 )             28.0     06-22    144  |  105  |  36<H>  ----------------------------<  78  3.9   |  25  |  1.51<H>    Ca    9.8      22 Jun 2023 07:25  Phos  2.8     06-22  Mg     1.6     06-22    TPro  7.1  /  Alb  4.0  /  TBili  0.5  /  DBili  x   /  AST  14  /  ALT  7<L>  /  AlkPhos  113  06-22          Urinalysis Basic - ( 22 Jun 2023 07:25 )    Color: x / Appearance: x / SG: x / pH: x  Gluc: 78 mg/dL / Ketone: x  / Bili: x / Urobili: x   Blood: x / Protein: x / Nitrite: x   Leuk Esterase: x / RBC: x / WBC x   Sq Epi: x / Non Sq Epi: x / Bacteria: x        RADIOLOGY & ADDITIONAL TESTS:    Imaging Personally Reviewed:    Consultant(s) Notes Reviewed:      Care Discussed with Consultants/Other Providers:

## 2023-06-24 ENCOUNTER — APPOINTMENT (OUTPATIENT)
Dept: HEMATOLOGY ONCOLOGY | Facility: CLINIC | Age: 73
End: 2023-06-24

## 2023-06-24 ENCOUNTER — APPOINTMENT (OUTPATIENT)
Dept: INFUSION THERAPY | Facility: HOSPITAL | Age: 73
End: 2023-06-24

## 2023-06-24 PROCEDURE — 80053 COMPREHEN METABOLIC PANEL: CPT

## 2023-06-24 PROCEDURE — 87641 MR-STAPH DNA AMP PROBE: CPT

## 2023-06-24 PROCEDURE — 99285 EMERGENCY DEPT VISIT HI MDM: CPT

## 2023-06-24 PROCEDURE — 80048 BASIC METABOLIC PNL TOTAL CA: CPT

## 2023-06-24 PROCEDURE — 85018 HEMOGLOBIN: CPT

## 2023-06-24 PROCEDURE — 81001 URINALYSIS AUTO W/SCOPE: CPT

## 2023-06-24 PROCEDURE — 87040 BLOOD CULTURE FOR BACTERIA: CPT

## 2023-06-24 PROCEDURE — 97161 PT EVAL LOW COMPLEX 20 MIN: CPT

## 2023-06-24 PROCEDURE — 97116 GAIT TRAINING THERAPY: CPT

## 2023-06-24 PROCEDURE — 93005 ELECTROCARDIOGRAM TRACING: CPT

## 2023-06-24 PROCEDURE — 87086 URINE CULTURE/COLONY COUNT: CPT

## 2023-06-24 PROCEDURE — 76770 US EXAM ABDO BACK WALL COMP: CPT

## 2023-06-24 PROCEDURE — 84132 ASSAY OF SERUM POTASSIUM: CPT

## 2023-06-24 PROCEDURE — 87640 STAPH A DNA AMP PROBE: CPT

## 2023-06-24 PROCEDURE — 84484 ASSAY OF TROPONIN QUANT: CPT

## 2023-06-24 PROCEDURE — 71045 X-RAY EXAM CHEST 1 VIEW: CPT

## 2023-06-24 PROCEDURE — 82330 ASSAY OF CALCIUM: CPT

## 2023-06-24 PROCEDURE — 82947 ASSAY GLUCOSE BLOOD QUANT: CPT

## 2023-06-24 PROCEDURE — 87077 CULTURE AEROBIC IDENTIFY: CPT

## 2023-06-24 PROCEDURE — 85730 THROMBOPLASTIN TIME PARTIAL: CPT

## 2023-06-24 PROCEDURE — 36415 COLL VENOUS BLD VENIPUNCTURE: CPT

## 2023-06-24 PROCEDURE — 84550 ASSAY OF BLOOD/URIC ACID: CPT

## 2023-06-24 PROCEDURE — 82435 ASSAY OF BLOOD CHLORIDE: CPT

## 2023-06-24 PROCEDURE — 82803 BLOOD GASES ANY COMBINATION: CPT

## 2023-06-24 PROCEDURE — 82962 GLUCOSE BLOOD TEST: CPT

## 2023-06-24 PROCEDURE — 84295 ASSAY OF SERUM SODIUM: CPT

## 2023-06-24 PROCEDURE — 0225U NFCT DS DNA&RNA 21 SARSCOV2: CPT

## 2023-06-24 PROCEDURE — 97530 THERAPEUTIC ACTIVITIES: CPT

## 2023-06-24 PROCEDURE — 83735 ASSAY OF MAGNESIUM: CPT

## 2023-06-24 PROCEDURE — 83605 ASSAY OF LACTIC ACID: CPT

## 2023-06-24 PROCEDURE — 87150 DNA/RNA AMPLIFIED PROBE: CPT

## 2023-06-24 PROCEDURE — 84100 ASSAY OF PHOSPHORUS: CPT

## 2023-06-24 PROCEDURE — 73130 X-RAY EXAM OF HAND: CPT

## 2023-06-24 PROCEDURE — 85014 HEMATOCRIT: CPT

## 2023-06-24 PROCEDURE — 85025 COMPLETE CBC W/AUTO DIFF WBC: CPT

## 2023-06-24 PROCEDURE — 96374 THER/PROPH/DIAG INJ IV PUSH: CPT

## 2023-06-24 PROCEDURE — 85610 PROTHROMBIN TIME: CPT

## 2023-06-24 PROCEDURE — 83615 LACTATE (LD) (LDH) ENZYME: CPT

## 2023-06-25 LAB
CULTURE RESULTS: SIGNIFICANT CHANGE UP
SPECIMEN SOURCE: SIGNIFICANT CHANGE UP

## 2023-06-27 ENCOUNTER — NON-APPOINTMENT (OUTPATIENT)
Age: 73
End: 2023-06-27

## 2023-06-27 LAB
ALBUMIN SERPL ELPH-MCNC: 4 G/DL
ALP BLD-CCNC: 118 U/L
ALT SERPL-CCNC: 10 U/L
ANION GAP SERPL CALC-SCNC: 17 MMOL/L
AST SERPL-CCNC: 12 U/L
B2 MICROGLOB SERPL-MCNC: 18.5 MG/L
BILIRUB SERPL-MCNC: 0.4 MG/DL
BUN SERPL-MCNC: 58 MG/DL
CALCIUM SERPL-MCNC: 8.7 MG/DL
CHLORIDE SERPL-SCNC: 98 MMOL/L
CO2 SERPL-SCNC: 24 MMOL/L
CREAT SERPL-MCNC: 3.04 MG/DL
EGFR: 21 ML/MIN/1.73M2
GLUCOSE SERPL-MCNC: 102 MG/DL
LDH SERPL-CCNC: 191 U/L
POTASSIUM SERPL-SCNC: 3.8 MMOL/L
PROT SERPL-MCNC: 6.7 G/DL
SODIUM SERPL-SCNC: 140 MMOL/L
URATE SERPL-MCNC: 9.8 MG/DL

## 2023-06-28 LAB
CULTURE RESULTS: SIGNIFICANT CHANGE UP
CULTURE RESULTS: SIGNIFICANT CHANGE UP
SPECIMEN SOURCE: SIGNIFICANT CHANGE UP
SPECIMEN SOURCE: SIGNIFICANT CHANGE UP

## 2023-06-30 ENCOUNTER — OUTPATIENT (OUTPATIENT)
Dept: OUTPATIENT SERVICES | Facility: HOSPITAL | Age: 73
LOS: 1 days | Discharge: HOME | End: 2023-06-30

## 2023-06-30 DIAGNOSIS — C82.20 FOLLICULAR LYMPHOMA GRADE III, UNSPECIFIED, UNSPECIFIED SITE: ICD-10-CM

## 2023-06-30 DIAGNOSIS — Z95.0 PRESENCE OF CARDIAC PACEMAKER: Chronic | ICD-10-CM

## 2023-06-30 DIAGNOSIS — C85.90 NON-HODGKIN LYMPHOMA, UNSPECIFIED, UNSPECIFIED SITE: Chronic | ICD-10-CM

## 2023-07-01 ENCOUNTER — APPOINTMENT (OUTPATIENT)
Dept: INFUSION THERAPY | Facility: HOSPITAL | Age: 73
End: 2023-07-01

## 2023-07-11 ENCOUNTER — APPOINTMENT (OUTPATIENT)
Dept: GERIATRICS | Facility: CLINIC | Age: 73
End: 2023-07-11
Payer: MEDICARE

## 2023-07-11 ENCOUNTER — NON-APPOINTMENT (OUTPATIENT)
Age: 73
End: 2023-07-11

## 2023-07-11 ENCOUNTER — APPOINTMENT (OUTPATIENT)
Dept: CARDIOLOGY | Facility: CLINIC | Age: 73
End: 2023-07-11
Payer: MEDICARE

## 2023-07-11 VITALS
SYSTOLIC BLOOD PRESSURE: 140 MMHG | HEART RATE: 67 BPM | WEIGHT: 150 LBS | BODY MASS INDEX: 21 KG/M2 | DIASTOLIC BLOOD PRESSURE: 74 MMHG | OXYGEN SATURATION: 100 % | HEIGHT: 71 IN

## 2023-07-11 VITALS
DIASTOLIC BLOOD PRESSURE: 77 MMHG | HEART RATE: 81 BPM | OXYGEN SATURATION: 99 % | SYSTOLIC BLOOD PRESSURE: 144 MMHG | RESPIRATION RATE: 17 BRPM | HEIGHT: 71 IN | BODY MASS INDEX: 22.12 KG/M2 | TEMPERATURE: 96.9 F | WEIGHT: 158 LBS

## 2023-07-11 VITALS — HEART RATE: 64 BPM | SYSTOLIC BLOOD PRESSURE: 130 MMHG | DIASTOLIC BLOOD PRESSURE: 70 MMHG

## 2023-07-11 DIAGNOSIS — N18.30 CHRONIC KIDNEY DISEASE, STAGE 3 UNSPECIFIED: ICD-10-CM

## 2023-07-11 DIAGNOSIS — M10.9 GOUT, UNSPECIFIED: ICD-10-CM

## 2023-07-11 DIAGNOSIS — D70.9 NEUTROPENIA, UNSPECIFIED: ICD-10-CM

## 2023-07-11 PROCEDURE — 99214 OFFICE O/P EST MOD 30 MIN: CPT

## 2023-07-11 PROCEDURE — 93000 ELECTROCARDIOGRAM COMPLETE: CPT

## 2023-07-11 PROCEDURE — 99215 OFFICE O/P EST HI 40 MIN: CPT | Mod: GC

## 2023-07-11 RX ORDER — OMEPRAZOLE 40 MG/1
40 CAPSULE, DELAYED RELEASE ORAL
Refills: 0 | Status: DISCONTINUED | COMMUNITY
End: 2023-07-11

## 2023-07-11 RX ORDER — FLUCONAZOLE 200 MG/1
200 TABLET ORAL
Qty: 30 | Refills: 0 | Status: DISCONTINUED | COMMUNITY
Start: 2023-04-21 | End: 2023-07-11

## 2023-07-11 RX ORDER — FOLIC ACID 1 MG/1
1 TABLET ORAL
Refills: 0 | Status: DISCONTINUED | COMMUNITY
End: 2023-07-11

## 2023-07-11 RX ORDER — LEVOFLOXACIN 250 MG/1
250 TABLET, FILM COATED ORAL DAILY
Qty: 30 | Refills: 0 | Status: COMPLETED | COMMUNITY
Start: 2023-04-06

## 2023-07-11 RX ORDER — ACYCLOVIR 400 MG/1
400 TABLET ORAL TWICE DAILY
Qty: 180 | Refills: 1 | Status: DISCONTINUED | COMMUNITY
Start: 2023-03-30 | End: 2023-07-11

## 2023-07-11 RX ORDER — LISINOPRIL 40 MG/1
40 TABLET ORAL
Qty: 90 | Refills: 3 | Status: DISCONTINUED | COMMUNITY
Start: 2023-02-14 | End: 2023-07-11

## 2023-07-11 RX ORDER — FERROUS SULFATE TAB EC 325 MG (65 MG FE EQUIVALENT) 325 (65 FE) MG
325 (65 FE) TABLET DELAYED RESPONSE ORAL DAILY
Refills: 0 | Status: DISCONTINUED | COMMUNITY
End: 2023-07-11

## 2023-07-11 RX ORDER — LISINOPRIL 40 MG/1
40 TABLET ORAL DAILY
Refills: 0 | Status: DISCONTINUED | COMMUNITY
End: 2023-07-11

## 2023-07-11 NOTE — DISCUSSION/SUMMARY
[Patient] : the patient [EKG obtained to assist in diagnosis and management of assessed problem(s)] : EKG obtained to assist in diagnosis and management of assessed problem(s) 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 [Cardiomyopathy] : cardiomyopathy [NYHA Class II] : NYHA Class II [Hypertensive Cardiomyopathy] : hypertensive cardiomyopathy [Echocardiogram] : echocardiogram [Resynchronization Therapy] : resynchronization therapy [Coronary Artery Disease] : coronary artery disease [Systolic Heart Failure] : systolic heart failure [Compensated] : compensated [Essential Hypertension] : essential hypertension [Responding to Treatment] : responding to treatment [None] : There are no changes in medication management [de-identified] : 100% bi-ventricular paced [FreeTextEntry1] : discussed need to follow up with pacemaker monitoring. He has disconnected monitoring device and missed recent EP appointment. He and daughter will call to reschedule [de-identified] : replaced furosemide with torsemide, but with multiple hospital stays now off all diuretic and has no edema  [de-identified] : in association with multiple severe illnesses including follicular lymphoma undergoing chemotherapy blood pressure currently not elevated [de-identified] : off diuretics, off ACE-i, blood pressure satisfactory [de-identified] : Too many instances of failing to take medications, today he says it is because going to internist and instructed to not eat for blood tests [FreeTextEntry2] : and daughter

## 2023-07-11 NOTE — CARDIOLOGY SUMMARY
[de-identified] : 6/24/2021 sinus rhythm with bi-ventricular paced rhythm\par 12/23/2021 mild sinus tachycardia, P-sense, biventricular paced.\par 5/24/2022 sinus rhythm 64,  P-sense and biventricular paced.\par 6/13/2022 sinus tachycardia 108, P-sense and biventricular pace.\par 7/6/2022  probable atrial tachycardia with biventricular pacing 2:1, heart rate 108.\par 8/2/2022 sinus tachycardia and biventricular paced at 102.\par 10/10/2022 \par 2/14/2023 atrial and biventricular paced.\par 3/14/2023 atrial and biventricular paced at 60.\par 7/11/2023 atrial and biventricular paced at 64. [de-identified] : 6/24/2021 mild to moderate global LV dysfunction with dilated and hypertrophied LV, left atrial enlargement, normal right heart with device wire, mild to moderate MR. Compared to 11/7/2019 hospital study pulmonary hypertension improved and all other findings unchanged.

## 2023-07-11 NOTE — PHYSICAL EXAM
[Normal Rate] : normal [Rhythm Regular] : regular [Normal S1] : normal S1 [Normal S2] : normal S2 [II] : a grade 2 [Holosystolic] : holosystolic [[place]] : the murmur was transmitted to the apex [No Pitting Edema] : no pitting edema present [2+] : left 2+ [1+] : left 1+ [0] : left 0 [Dullness ____] : dullness [unfilled] [Normal] : alert and oriented, normal memory [Right Carotid Bruit] : no bruit heard over the right carotid [Left Carotid Bruit] : no bruit heard over the left carotid [de-identified] : left infraclavicular pacemaker pocket

## 2023-07-11 NOTE — REVIEW OF SYSTEMS
[As Noted in HPI] : as noted in HPI [Negative] : Genitourinary [Change In Personality] : personality change

## 2023-07-11 NOTE — HISTORY OF PRESENT ILLNESS
[FreeTextEntry1] : Mr. 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 He was roughly 30 lbs above his last discharge weight and the trigger is not taking his medications for 3 months because he felt well and did not refill prescriptions. He was also found to have atrial flutter which was a new diagnosis. Successfully diuresed 40 lbs in hospital and underwent CRT-P upgrade from Micra due to high pacing burden. Had TE guided cardioversion and remained in sinus rhythm. Continues to feel well, now fully active, able to walk quickly up flights of stairs, walk good distances all without dyspnea and denies orthopnea. \par \par At a prior visit found serum potassium elevated and endocrine had instructed to increase spironolactone, but contacted him to remain unchanged and avoid high potassium sources in diet. Since then doing very well, active, no dyspnea, there is no orthopnea or paroxysmal nocturnal dyspnea. Walks regularly, maybe half mile to and from stores.\par \par Since last seen doing extremely well, no symptoms, walking outdoors for exercise. However, apixaban became too expensive (Medicare D "donut hole") and has not been taking.\par \par Recently weight up, not feeling well, no fever, saw internist, treated with antibiotic for pneumonia and feeling better. However at pacemaker interrogation Optivol indicated volume excess. There is no orthopnea or paroxysmal nocturnal dyspnea At last visit observed good response to diuretic, making large amount of urine and feeling better, however Optivol still elevated and had runs of NSVT. Developed several prominent lymph nodes and biopsy  planned, off apixaban. Biopsy accomplished, diagnosis seems to be follicular lymphoma. Resumed apixaban.\par \par Referred to EP to consider possible atrial tachycardia, flutter, but concluded was sinus tachycardia. He is short of breath on minimal exertion, but denies orthopnea or paroxysmal nocturnal dyspnea. One week ago switched diuretic to torsemide and dramatic response.\par \par Admitted to hospital multiple times since last visit. Had excision of malignant mass on back, diagnosis of follicular lymphoma, started chemotherapy then admitted mid September and remained until October 3 for altered mental status. \par \par Multiple hospital admissions 2023. Had COVID pneumonia, anemia, lymphoma. Visit few months ago from Rehab facility. Then home with wife and daughter and visiting nurses. Then in hospital again when Oncology found blood pressure low. Has no chest pain, dyspnea, orthopnea or paroxysmal nocturnal dyspnea

## 2023-07-12 NOTE — HISTORY OF PRESENT ILLNESS
[Severe] : Stage: Severe [Stable] : Status: Stable [Patient/Caregiver not ready to engage] : , patient/caregiver not ready to engage [FreeTextEntry1] : The pt 72 yo F with medical history of CHF, A. flutter on eliquis, Anemia, dementia, Hyperuricemia, HTN, G6PD deficiency, Hyperlipidemia, Lymphoma presented for regular follow up. \par \par Recently he was admitted to [facility] June 19 to 23. He went to his oncologist on June 19 for regular follow up. It was found his BP was low that’s why he was referred to Hospital. He was admitted and treated with IV fluids. Responded well. Blood cx showed MRSE which was thought to be contaminant. Repeat blood cx NGTD. He was discharged from hospital after 2 days with outpt follow up. \par \par During my evaluation,pt denied any significant physical complaints. \par \par Next appt with cardiology on July 11, 2023  [No falls in past year] : Patient reported no falls in the past year [FreeTextEntry4] : Needs f/u GOC- currently FULL CODE

## 2023-07-12 NOTE — PHYSICAL EXAM
[Healthy Appearing] : healthy appearing [No Acute Distress] : in no acute distress [Normal] : normal bowel sounds, non-tender [Normal Outer Ear/Nose] : the ears and nose were normal in appearance [Edema] : edema was not present [Normal Gait] : normal gait [Involuntary Movements] : no involuntary movements were seen [Normal Color / Pigmentation] : normal skin color and pigmentation [de-identified] : some irritability and paranoia noted  [de-identified] : irritable  [MocaTotal] : 9 [FreeTextEntry1] : 6/13/23

## 2023-07-12 NOTE — REASON FOR VISIT
[Follow-Up] : a follow-up visit [Other: _____] : [unfilled] [FreeTextEntry1] : Follow up visit after recent hospitalization for hypotension

## 2023-07-12 NOTE — END OF VISIT
[] : Fellow [FreeTextEntry3] : Agree with the [facility] assessment and plan. \par I have reviewed and edited the note above as needed. \par Patient is currently with behavioral issues w/ advanced dementia. Although, functionally patient is at FAST 5/6. \par Daughter privately expressed features of care giver burden and is currently the sole care giver for the patient. \par Emotional support provided. Will reach out to SW next apt to assist. Currently daughter is hopeful to move closer to patient and have her mother join shortly to aide in his care. \par  [Time Spent: ___ minutes] : I have spent [unfilled] minutes of time on the encounter.

## 2023-07-15 ENCOUNTER — APPOINTMENT (OUTPATIENT)
Dept: INFUSION THERAPY | Facility: HOSPITAL | Age: 73
End: 2023-07-15

## 2023-07-15 ENCOUNTER — RESULT REVIEW (OUTPATIENT)
Age: 73
End: 2023-07-15

## 2023-07-15 ENCOUNTER — APPOINTMENT (OUTPATIENT)
Dept: HEMATOLOGY ONCOLOGY | Facility: CLINIC | Age: 73
End: 2023-07-15

## 2023-07-15 LAB
BASOPHILS # BLD AUTO: 0.02 K/UL — SIGNIFICANT CHANGE UP (ref 0–0.2)
BASOPHILS NFR BLD AUTO: 1 % — SIGNIFICANT CHANGE UP (ref 0–2)
DACRYOCYTES BLD QL SMEAR: SLIGHT — SIGNIFICANT CHANGE UP
EOSINOPHIL # BLD AUTO: 0.93 K/UL — HIGH (ref 0–0.5)
EOSINOPHIL NFR BLD AUTO: 45 % — HIGH (ref 0–6)
HCT VFR BLD CALC: 32.2 % — LOW (ref 39–50)
HGB BLD-MCNC: 10.6 G/DL — LOW (ref 13–17)
LYMPHOCYTES # BLD AUTO: 0.47 K/UL — LOW (ref 1–3.3)
LYMPHOCYTES # BLD AUTO: 23 % — SIGNIFICANT CHANGE UP (ref 13–44)
MCHC RBC-ENTMCNC: 32.1 PG — SIGNIFICANT CHANGE UP (ref 27–34)
MCHC RBC-ENTMCNC: 32.9 G/DL — SIGNIFICANT CHANGE UP (ref 32–36)
MCV RBC AUTO: 97.6 FL — SIGNIFICANT CHANGE UP (ref 80–100)
MONOCYTES # BLD AUTO: 0.27 K/UL — SIGNIFICANT CHANGE UP (ref 0–0.9)
MONOCYTES NFR BLD AUTO: 13 % — SIGNIFICANT CHANGE UP (ref 2–14)
NEUTROPHILS # BLD AUTO: 0.37 K/UL — LOW (ref 1.8–7.4)
NEUTROPHILS NFR BLD AUTO: 18 % — LOW (ref 43–77)
NRBC # BLD: 0 /100 — SIGNIFICANT CHANGE UP (ref 0–0)
NRBC # BLD: SIGNIFICANT CHANGE UP /100 WBCS (ref 0–0)
PLAT MORPH BLD: NORMAL — SIGNIFICANT CHANGE UP
PLATELET # BLD AUTO: 94 K/UL — LOW (ref 150–400)
POIKILOCYTOSIS BLD QL AUTO: SLIGHT — SIGNIFICANT CHANGE UP
RBC # BLD: 3.3 M/UL — LOW (ref 4.2–5.8)
RBC # FLD: 15.3 % — HIGH (ref 10.3–14.5)
RBC BLD AUTO: ABNORMAL
WBC # BLD: 2.06 K/UL — LOW (ref 3.8–10.5)
WBC # FLD AUTO: 2.06 K/UL — LOW (ref 3.8–10.5)

## 2023-07-16 LAB
ALBUMIN SERPL ELPH-MCNC: 4.3 G/DL — SIGNIFICANT CHANGE UP (ref 3.3–5)
ALP SERPL-CCNC: 135 U/L — HIGH (ref 40–120)
ALT FLD-CCNC: 12 U/L — SIGNIFICANT CHANGE UP (ref 10–45)
ANION GAP SERPL CALC-SCNC: 16 MMOL/L — SIGNIFICANT CHANGE UP (ref 5–17)
AST SERPL-CCNC: 18 U/L — SIGNIFICANT CHANGE UP (ref 10–40)
BILIRUB SERPL-MCNC: 0.2 MG/DL — SIGNIFICANT CHANGE UP (ref 0.2–1.2)
BUN SERPL-MCNC: 43 MG/DL — HIGH (ref 7–23)
CALCIUM SERPL-MCNC: 9.1 MG/DL — SIGNIFICANT CHANGE UP (ref 8.4–10.5)
CHLORIDE SERPL-SCNC: 107 MMOL/L — SIGNIFICANT CHANGE UP (ref 96–108)
CO2 SERPL-SCNC: 21 MMOL/L — LOW (ref 22–31)
CREAT SERPL-MCNC: 1.48 MG/DL — HIGH (ref 0.5–1.3)
EGFR: 50 ML/MIN/1.73M2 — LOW
GLUCOSE SERPL-MCNC: 82 MG/DL — SIGNIFICANT CHANGE UP (ref 70–99)
LDH SERPL L TO P-CCNC: 249 U/L — HIGH (ref 50–242)
MAGNESIUM SERPL-MCNC: 1.9 MG/DL — SIGNIFICANT CHANGE UP (ref 1.6–2.6)
PHOSPHATE SERPL-MCNC: 3.7 MG/DL — SIGNIFICANT CHANGE UP (ref 2.5–4.5)
POTASSIUM SERPL-MCNC: 4.7 MMOL/L — SIGNIFICANT CHANGE UP (ref 3.5–5.3)
POTASSIUM SERPL-SCNC: 4.7 MMOL/L — SIGNIFICANT CHANGE UP (ref 3.5–5.3)
PROT SERPL-MCNC: 6.9 G/DL — SIGNIFICANT CHANGE UP (ref 6–8.3)
SODIUM SERPL-SCNC: 145 MMOL/L — SIGNIFICANT CHANGE UP (ref 135–145)
URATE SERPL-MCNC: 7.5 MG/DL — SIGNIFICANT CHANGE UP (ref 3.4–8.8)

## 2023-07-17 DIAGNOSIS — Z51.11 ENCOUNTER FOR ANTINEOPLASTIC CHEMOTHERAPY: ICD-10-CM

## 2023-07-17 DIAGNOSIS — R11.2 NAUSEA WITH VOMITING, UNSPECIFIED: ICD-10-CM

## 2023-07-18 ENCOUNTER — NON-APPOINTMENT (OUTPATIENT)
Age: 73
End: 2023-07-18

## 2023-07-26 ENCOUNTER — APPOINTMENT (OUTPATIENT)
Dept: HEMATOLOGY ONCOLOGY | Facility: CLINIC | Age: 73
End: 2023-07-26
Payer: MEDICARE

## 2023-07-26 ENCOUNTER — RESULT REVIEW (OUTPATIENT)
Age: 73
End: 2023-07-26

## 2023-07-26 VITALS
HEART RATE: 70 BPM | TEMPERATURE: 98.1 F | OXYGEN SATURATION: 99 % | RESPIRATION RATE: 18 BRPM | BODY MASS INDEX: 21.8 KG/M2 | SYSTOLIC BLOOD PRESSURE: 177 MMHG | DIASTOLIC BLOOD PRESSURE: 68 MMHG | WEIGHT: 156.31 LBS

## 2023-07-26 LAB
BASOPHILS # BLD AUTO: 0.01 K/UL — SIGNIFICANT CHANGE UP (ref 0–0.2)
BASOPHILS NFR BLD AUTO: 1 % — SIGNIFICANT CHANGE UP (ref 0–2)
EOSINOPHIL # BLD AUTO: 0.28 K/UL — SIGNIFICANT CHANGE UP (ref 0–0.5)
EOSINOPHIL NFR BLD AUTO: 22 % — HIGH (ref 0–6)
HCT VFR BLD CALC: 32.6 % — LOW (ref 39–50)
HGB BLD-MCNC: 10.9 G/DL — LOW (ref 13–17)
LYMPHOCYTES # BLD AUTO: 0.48 K/UL — LOW (ref 1–3.3)
LYMPHOCYTES # BLD AUTO: 38 % — SIGNIFICANT CHANGE UP (ref 13–44)
MCHC RBC-ENTMCNC: 31.4 PG — SIGNIFICANT CHANGE UP (ref 27–34)
MCHC RBC-ENTMCNC: 33.4 G/DL — SIGNIFICANT CHANGE UP (ref 32–36)
MCV RBC AUTO: 93.9 FL — SIGNIFICANT CHANGE UP (ref 80–100)
MONOCYTES # BLD AUTO: 0.3 K/UL — SIGNIFICANT CHANGE UP (ref 0–0.9)
MONOCYTES NFR BLD AUTO: 24 % — HIGH (ref 2–14)
NEUTROPHILS # BLD AUTO: 0.19 K/UL — LOW (ref 1.8–7.4)
NEUTROPHILS NFR BLD AUTO: 15 % — LOW (ref 43–77)
NRBC # BLD: 0 /100 — SIGNIFICANT CHANGE UP (ref 0–0)
NRBC # BLD: SIGNIFICANT CHANGE UP /100 WBCS (ref 0–0)
PLAT MORPH BLD: NORMAL — SIGNIFICANT CHANGE UP
PLATELET # BLD AUTO: 90 K/UL — LOW (ref 150–400)
RBC # BLD: 3.47 M/UL — LOW (ref 4.2–5.8)
RBC # FLD: 14.3 % — SIGNIFICANT CHANGE UP (ref 10.3–14.5)
RBC BLD AUTO: SIGNIFICANT CHANGE UP
WBC # BLD: 1.27 K/UL — LOW (ref 3.8–10.5)
WBC # FLD AUTO: 1.27 K/UL — LOW (ref 3.8–10.5)

## 2023-07-26 PROCEDURE — 99215 OFFICE O/P EST HI 40 MIN: CPT

## 2023-07-29 ENCOUNTER — LABORATORY RESULT (OUTPATIENT)
Age: 73
End: 2023-07-29

## 2023-07-29 ENCOUNTER — APPOINTMENT (OUTPATIENT)
Dept: INFUSION THERAPY | Facility: HOSPITAL | Age: 73
End: 2023-07-29

## 2023-07-29 ENCOUNTER — RESULT REVIEW (OUTPATIENT)
Age: 73
End: 2023-07-29

## 2023-07-29 ENCOUNTER — APPOINTMENT (OUTPATIENT)
Dept: HEMATOLOGY ONCOLOGY | Facility: CLINIC | Age: 73
End: 2023-07-29

## 2023-07-29 LAB
BASOPHILS # BLD AUTO: 0.03 K/UL — SIGNIFICANT CHANGE UP (ref 0–0.2)
BASOPHILS NFR BLD AUTO: 2 % — SIGNIFICANT CHANGE UP (ref 0–2)
EOSINOPHIL # BLD AUTO: 0.24 K/UL — SIGNIFICANT CHANGE UP (ref 0–0.5)
EOSINOPHIL NFR BLD AUTO: 19 % — HIGH (ref 0–6)
HCT VFR BLD CALC: 33.9 % — LOW (ref 39–50)
HGB BLD-MCNC: 11.2 G/DL — LOW (ref 13–17)
LYMPHOCYTES # BLD AUTO: 0.51 K/UL — LOW (ref 1–3.3)
LYMPHOCYTES # BLD AUTO: 40 % — SIGNIFICANT CHANGE UP (ref 13–44)
MCHC RBC-ENTMCNC: 30.9 PG — SIGNIFICANT CHANGE UP (ref 27–34)
MCHC RBC-ENTMCNC: 33 G/DL — SIGNIFICANT CHANGE UP (ref 32–36)
MCV RBC AUTO: 93.4 FL — SIGNIFICANT CHANGE UP (ref 80–100)
MONOCYTES # BLD AUTO: 0.32 K/UL — SIGNIFICANT CHANGE UP (ref 0–0.9)
MONOCYTES NFR BLD AUTO: 25 % — HIGH (ref 2–14)
NEUTROPHILS # BLD AUTO: 0.18 K/UL — LOW (ref 1.8–7.4)
NEUTROPHILS NFR BLD AUTO: 14 % — LOW (ref 43–77)
NRBC # BLD: 0 /100 — SIGNIFICANT CHANGE UP (ref 0–0)
NRBC # BLD: SIGNIFICANT CHANGE UP /100 WBCS (ref 0–0)
PLAT MORPH BLD: NORMAL — SIGNIFICANT CHANGE UP
PLATELET # BLD AUTO: 104 K/UL — LOW (ref 150–400)
RBC # BLD: 3.63 M/UL — LOW (ref 4.2–5.8)
RBC # FLD: 13.8 % — SIGNIFICANT CHANGE UP (ref 10.3–14.5)
RBC BLD AUTO: SIGNIFICANT CHANGE UP
WBC # BLD: 1.27 K/UL — LOW (ref 3.8–10.5)
WBC # FLD AUTO: 1.27 K/UL — LOW (ref 3.8–10.5)

## 2023-08-10 NOTE — PATIENT PROFILE ADULT - NSTRANSFERBELONGINGSDISPO_GEN_A_NUR
[Alert] : alert [No Acute Distress] : no acute distress [Normocephalic] : normocephalic [Conjunctivae with no discharge] : conjunctivae with no discharge [PERRL] : PERRL [EOMI Bilateral] : EOMI bilateral [Auricles Well Formed] : auricles well formed [Clear Tympanic membranes with present light reflex and bony landmarks] : clear tympanic membranes with present light reflex and bony landmarks [No Discharge] : no discharge [Nares Patent] : nares patent [Pink Nasal Mucosa] : pink nasal mucosa [Palate Intact] : palate intact [Nonerythematous Oropharynx] : nonerythematous oropharynx [Supple, full passive range of motion] : supple, full passive range of motion [No Palpable Masses] : no palpable masses [Symmetric Chest Rise] : symmetric chest rise with patient [Clear to Auscultation Bilaterally] : clear to auscultation bilaterally [Regular Rate and Rhythm] : regular rate and rhythm [Normal S1, S2 present] : normal S1, S2 present [No Murmurs] : no murmurs [+2 Femoral Pulses] : +2 femoral pulses [Soft] : soft [NonTender] : non tender [Non Distended] : non distended [Normoactive Bowel Sounds] : normoactive bowel sounds [No Hepatomegaly] : no hepatomegaly [No Splenomegaly] : no splenomegaly [No Abnormal Lymph Nodes Palpated] : no abnormal lymph nodes palpated [Normal Muscle Tone] : normal muscle tone [Straight] : straight [No Scoliosis] : no scoliosis [Cranial Nerves Grossly Intact] : cranial nerves grossly intact [No Rash or Lesions] : no rash or lesions

## 2023-08-11 ENCOUNTER — RX RENEWAL (OUTPATIENT)
Age: 73
End: 2023-08-11

## 2023-08-11 RX ORDER — APIXABAN 5 MG/1
5 TABLET, FILM COATED ORAL
Qty: 180 | Refills: 3 | Status: ACTIVE | COMMUNITY
Start: 2019-10-16 | End: 1900-01-01

## 2023-08-12 ENCOUNTER — APPOINTMENT (OUTPATIENT)
Dept: INFUSION THERAPY | Facility: HOSPITAL | Age: 73
End: 2023-08-12

## 2023-08-15 NOTE — ASSESSMENT
[FreeTextEntry1] : 74 yo male with PMHx significant for follicular lymphoma with DLBCL (tx with R-CHOP in 2003, with relapse in 2009, treated with BR) with his initial presentation at [facility] with multiple areas of palpable lymphadenopathy with follicular lymphoma grade IIIA (IPI score 3) in the setting of a steady decline in weight > 20 lbs in the previous 6 months without other constitutional complaints. 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   Due to symptoms / clinical picture, he was started on therapy. He is s/p 3 cycles of obinutuzumab + mini-CHOP, however this was d/c'd due to intolerability / complications. He is now on second-line therapy with Tafasitamab (anti-CD19) + lenalidomide (15 mg 21/28 days) since 3/30/23. Due to severe neutropenia, lenalidomide has been held since May 2023 and he will skip 8/12/23 tafa treatment with the next treatment planned for 8/26/23 if neutrophils recovery > 500. Today is Cycle 4 Day 12 of Tafasitamab + lenalidomide.  Plan: - Repeat Lymphoma labs once per cycle, monitor CBC each clinic or tx room visit (Pre-F) - Continue Tafasitamab 12 mg/kg every 2 weeks until progression or intolerance once cleared of an acute process. - HOLD Revlimid. HOLD Tafa on 8/12/23. Once neutrophils recovery will change dose to 10 mg x 21 days. CBC with every treatment visit to monitor for neutropenia. - VTE ppx while on Revlimid; on Apixaban for cardiac issues, Continue 5 mg q12hrs.  - Continue Allopurinol - Antiviral ppx: Acyclovir 400 mg BID - Neutropenia ppx: If ANC < 1.0 and expected to remain low for >= 7 days, take Levaquin 500 mg daily. - Behavioral issues: Suspect dementia as he has had fairly consistent times of confusion and aggressive behavior over the past 7 months at least. No concrete evidence of lymphoma CNS involvement at present. - Follow up monthly or sooner as needed for any issue  ___ I personally have spent a total of 40 minutes of time on the date of this encounter reviewing test results, documenting findings, providing education, coordinating care and directly consulting with the patient and/or designated family member.  [Palliative] : Goals of care discussed with patient: Palliative

## 2023-08-15 NOTE — PHYSICAL EXAM
[Ambulatory and capable of all self care but unable to carry out any work activities] : Status 2- Ambulatory and capable of all self care but unable to carry out any work activities. Up and about more than 50% of waking hours [Thin] : thin [Normal] : affect appropriate [de-identified] : Ambulating [de-identified] : +PPM [de-identified] : RLE trace edema [de-identified] : Left epicondylar mass

## 2023-08-16 ENCOUNTER — NON-APPOINTMENT (OUTPATIENT)
Age: 73
End: 2023-08-16

## 2023-08-16 ENCOUNTER — RESULT REVIEW (OUTPATIENT)
Age: 73
End: 2023-08-16

## 2023-08-16 ENCOUNTER — APPOINTMENT (OUTPATIENT)
Dept: HEMATOLOGY ONCOLOGY | Facility: CLINIC | Age: 73
End: 2023-08-16
Payer: MEDICARE

## 2023-08-16 ENCOUNTER — APPOINTMENT (OUTPATIENT)
Dept: NEUROLOGY | Facility: CLINIC | Age: 73
End: 2023-08-16
Payer: MEDICARE

## 2023-08-16 VITALS
DIASTOLIC BLOOD PRESSURE: 78 MMHG | RESPIRATION RATE: 16 BRPM | SYSTOLIC BLOOD PRESSURE: 148 MMHG | HEIGHT: 71 IN | OXYGEN SATURATION: 98 % | HEART RATE: 84 BPM

## 2023-08-16 VITALS
TEMPERATURE: 97 F | WEIGHT: 153.22 LBS | BODY MASS INDEX: 21.37 KG/M2 | OXYGEN SATURATION: 99 % | SYSTOLIC BLOOD PRESSURE: 159 MMHG | RESPIRATION RATE: 16 BRPM | HEART RATE: 65 BPM | DIASTOLIC BLOOD PRESSURE: 72 MMHG

## 2023-08-16 LAB
BASOPHILS # BLD AUTO: 0.03 K/UL — SIGNIFICANT CHANGE UP (ref 0–0.2)
BASOPHILS NFR BLD AUTO: 0.7 % — SIGNIFICANT CHANGE UP (ref 0–2)
EOSINOPHIL # BLD AUTO: 0.27 K/UL — SIGNIFICANT CHANGE UP (ref 0–0.5)
EOSINOPHIL NFR BLD AUTO: 6 % — SIGNIFICANT CHANGE UP (ref 0–6)
HCT VFR BLD CALC: 37.7 % — LOW (ref 39–50)
HGB BLD-MCNC: 12.4 G/DL — LOW (ref 13–17)
IMM GRANULOCYTES NFR BLD AUTO: 0.4 % — SIGNIFICANT CHANGE UP (ref 0–0.9)
LYMPHOCYTES # BLD AUTO: 0.42 K/UL — LOW (ref 1–3.3)
LYMPHOCYTES # BLD AUTO: 9.3 % — LOW (ref 13–44)
MCHC RBC-ENTMCNC: 30.9 PG — SIGNIFICANT CHANGE UP (ref 27–34)
MCHC RBC-ENTMCNC: 32.9 G/DL — SIGNIFICANT CHANGE UP (ref 32–36)
MCV RBC AUTO: 94 FL — SIGNIFICANT CHANGE UP (ref 80–100)
MONOCYTES # BLD AUTO: 0.5 K/UL — SIGNIFICANT CHANGE UP (ref 0–0.9)
MONOCYTES NFR BLD AUTO: 11 % — SIGNIFICANT CHANGE UP (ref 2–14)
NEUTROPHILS # BLD AUTO: 3.29 K/UL — SIGNIFICANT CHANGE UP (ref 1.8–7.4)
NEUTROPHILS NFR BLD AUTO: 72.6 % — SIGNIFICANT CHANGE UP (ref 43–77)
NRBC # BLD: 0 /100 WBCS — SIGNIFICANT CHANGE UP (ref 0–0)
PLATELET # BLD AUTO: 109 K/UL — LOW (ref 150–400)
RBC # BLD: 4.01 M/UL — LOW (ref 4.2–5.8)
RBC # FLD: 13.2 % — SIGNIFICANT CHANGE UP (ref 10.3–14.5)
WBC # BLD: 4.53 K/UL — SIGNIFICANT CHANGE UP (ref 3.8–10.5)
WBC # FLD AUTO: 4.53 K/UL — SIGNIFICANT CHANGE UP (ref 3.8–10.5)

## 2023-08-16 PROCEDURE — 99215 OFFICE O/P EST HI 40 MIN: CPT

## 2023-08-16 PROCEDURE — 99214 OFFICE O/P EST MOD 30 MIN: CPT

## 2023-08-16 RX ORDER — LEVOFLOXACIN 500 MG/1
500 TABLET, FILM COATED ORAL DAILY
Qty: 30 | Refills: 0 | Status: DISCONTINUED | COMMUNITY
Start: 2023-07-26 | End: 2023-08-16

## 2023-08-16 RX ORDER — MULTIVITAMIN
TABLET ORAL
Refills: 0 | Status: DISCONTINUED | COMMUNITY
End: 2023-08-16

## 2023-08-16 RX ORDER — LENALIDOMIDE 15 MG/1
15 CAPSULE ORAL
Qty: 21 | Refills: 0 | Status: DISCONTINUED | COMMUNITY
Start: 2022-11-19 | End: 2023-08-16

## 2023-08-16 NOTE — PHYSICAL EXAM
[FreeTextEntry1] : He is awake and alert - oriented to name, place and year. Able to name high frequency objects and follow complex commands across the midline. Can spell WORLD forwards but not backwards (WO) Cand do simple but not more complex calculations. Memory is 1/3. EOMI, VFF Face symmetric and tongue mdiline Tone is normal - no tremor noted Strength is full in UE and LE bilaterally MIRTA and FNF intact Ambulates independently and steadily with normal arm swing.

## 2023-08-16 NOTE — HISTORY OF PRESENT ILLNESS
[FreeTextEntry1] : Mr. Lon Skaggs is a 74 yo right-handed man brought in by his daughter who is concerned that he has had cognitive decline.  PMH notable for CHF with borderline EF from hypertensive cardiomyopathy and severe functional MR. He had a PPM placed in 5/2019. He also has CKD.  He was diagnosed with NHL in 2001 - by report Diffuse Large B cell Lymphoma - s/p R-CHOP. This recurred in 2010 and he went to [facility] where he was treated with Bendamustine.  In 2022, he was noted to have increasing cervical adenopathy, weight loss, and night sweats. He underwent an ultrasound biopsy of his left axillary and cervical LN on 6/14/2022 and this demonstrated B-cell Lymphoma with follicular cell origin. PET/CT on 8/6/2022 showed FDG avidity in the left cervical, bilateral supraclavicular and intramuscular masses in the posterior chest wall.  Biopsy showed grade 3A follicular lymphoma IgG Lambda and Kappa MGUS. Positive for BCL6 translocation and negative for myc. He was started on a modified regimen of mini-COEP plus Obinutuzumab, cyclophosphamide, Etoposide (40% reduced dose) and 2 mg Vincristine.   LP 9/26/2022 - protein 61, LDH 27, neutrophils 0, lymphocytes 33, moncytes 67, OCB neg. Cytology - "atypical findings" Flow cytometry QNS

## 2023-08-17 LAB
ALBUMIN SERPL ELPH-MCNC: 4.8 G/DL
ALP BLD-CCNC: 148 U/L
ALT SERPL-CCNC: 21 U/L
ANION GAP SERPL CALC-SCNC: 13 MMOL/L
AST SERPL-CCNC: 28 U/L
BILIRUB SERPL-MCNC: 0.6 MG/DL
BUN SERPL-MCNC: 41 MG/DL
CALCIUM SERPL-MCNC: 9.4 MG/DL
CHLORIDE SERPL-SCNC: 105 MMOL/L
CO2 SERPL-SCNC: 26 MMOL/L
CREAT SERPL-MCNC: 1.8 MG/DL
EGFR: 40 ML/MIN/1.73M2
FOLATE SERPL-MCNC: 12.8 NG/ML
GLUCOSE SERPL-MCNC: 93 MG/DL
POTASSIUM SERPL-SCNC: 4.8 MMOL/L
PROT SERPL-MCNC: 7.9 G/DL
SODIUM SERPL-SCNC: 144 MMOL/L
TSH SERPL-ACNC: 1.2 UIU/ML
VIT B12 SERPL-MCNC: 416 PG/ML

## 2023-08-22 NOTE — HISTORY OF PRESENT ILLNESS
[Disease:__________________________] : Disease: [unfilled] [Treatment Protocol] : Treatment Protocol [de-identified] : Initial Presentation:\par  \par  71 yo with MHx significant for Atrial flutter (on Apixaban), HTN, here for further evaluation of low-level neutrophilia (since 1/2022) and lymphadenopathy. Previously NHL (around [id number]?). He received chemotherapy in 2004. 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 In 2010 he went to [place] and was treated for reoccurrence and was treated with bendamustine. He reports that he has had areas of swelling in his neck on and off for about 2 years which was mostly undergoing workup with his endocrinologist. In the last few months he has noticed increasing size of his left cervical LNs and a right nodule that caused swelling of his face, which has now spontaneously decreased in size significantly.\par  \par  Today he reports he feels well outside of weight loss. He denies any other constitutional complaints. He weighed 192 lbs in 10/2021 which was down to 183 lbs in December 12/2021(2 months later) which has further decreased down to 179 lbs today. He has not felt himself masses outside of his neck region. On 5/14/22, he went for US duplex of his left lower extremity due to a "tangerine size lump" on the back of his left thigh, which noted a 4.4 cm hypoechoic mass in his left inguinal region without commenting on his perceived posterior thigh mass. Also, on 5/2/22 he underwent US of his thyroid and parathyroid glands where they noticed bilateral cervical lymph nodes with the largest on the left side measuring 2.1 x 1.6 x 1.9 cm and a right level 1 LN measuring 2.2 x 1.2 x 2.3 cm. They saw 3 lymph nodes on the left side and one discrete LN on the right.\par  \par   He also recently just finished a 14 day course of Levofloxacin for an uncomplicated phenomena. He was having a dry cough and underwent imaging as an outpatient which found mild left and right pleural effusions, areas of consolidation on the right and retrocardiac airspace involvement (performed 5/2/22). He now feels better without infectious complaints.\par  \par  In addition, he is currently anemic. He last had a colonoscopy in his recent memory. He had bleeding hemorrhoids last year treated with OTC medications. He denies any tick bites recently. \par  \par  He also has MGUS with 3 separate monoclonal gammopathies I suspect is related to his NHL. He has M Judd 1 showing IgG Lambda at 0.3 g/dl, M Judd 2 showing IgG Kappa at 0.2 g/dl, M Judd 3 showing IgM Lambda (unable to quantitate). There is no suspicion for myeloma or waldenstroms at this time and his M-spikes will be monitored. He has no elevation in kappa/ lambda FLC ration, but individual light chains are both elevated, kappa at 10.77 mg/dL and lambda at 6.58 mg/dL.\par  \par  Social Hx\par  - He is currently retired. He used to be an .\par  - No significant environmental exposure to chemicals\par  - Lives by himself and his wife lives in [place].\par  - Former smoker. He smoked for 10 years less than 1 pack a day. He quit 20 years ago\par  \par  Family hx\par  - Two brothers non Hodgkins lymphoma. At least one required chemotherapy. sister had cervical cancer first diagnosed 2007 and then 3 years later with more cancer discovered\par  - Mother and father passed away from heart disease and diabetes.\par  \par  =======================================================\par  Interval Hx update:\par  \par  He underwent PET-CT in Aug 2022 which showed FDG avid lymph nodes in the left cervical, bilateral supraclavicular regions, and chest, and a few FDG avid abdominal lymph nodes, intramuscular masses in the left posterior chest and left upper thigh, scattered FDG avid subcutaneous soft tissue/nodules with trace right pleural effusion, moderate left pleural effusion, loculated fluid in the right middle lobe. Moderate abdominal and pelvic ascites. Subcutaneous edema in the lower abdominal wall extending into the imaged bilateral thighs. Splenomegaly with mild FDG avidity, suspicious for lymphomatous involvement.\par  \par  Prior to the PET-CT, he underwent biopsy of both a cervical lymph node and a left axilla lymph node. 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 There were areas of > 20 SUV on the PET-CT, repeat whole lymph node biopsy was requested to confirm suspected diagnosis of Grade 3B FL or transformed large cell lymphoma. S/p excisional biopsy of back lesion on 9/7/22 which showed recurrent DLBCL.\par  \par  LP 9/26/22: protein elevated at 61, LDH 27, neutrophils 0, lymphocytes 33, monocytes 67, RBC 5. Oligoclonal bands negative. The flow cytometry failed due to insufficient cells. Recommended continued IT MTX therapy x 4 total cycles. He had an interim PET-CT performed on 2/1/23 (3 months after discontinuation of O-miniCHOP in the middle of his therapy - received 3/6 cycles) which showed possible persistent disease including a small, residual focus of increased FDG activity in the left level II region, likely corresponding to a difficult to delineate lymph node, is decreased in size and metabolism (SUV 3.5; image 33; previous SUV 16.1 and skin thickening and subcutaneous nodules, right lower anterior and lateral abdominal wall, slightly more extensive and similar in metabolism (SUV 7.1; image 170; previous SUV 5.5). Discontinued chemoimmunotherapy with Obi-miniCHOP in Nov 2022 due to treatment complications, hospitalizations, poor PS. He was subsequently started on Tafasitamab (anti-CD19) + lenalidomide since 3/30/23. Lenalidomide dose reduced from 20 mg to 15 mg due to episodes of severe neutropenia.\par  \par  =======================================================\par  Care Providers:\par  \par  PMD/ Geriatrician: Dr Allison; Tel: [phone]\par  Endo: Dr Carter Vigil\par  Cardiologist: Dr. Don [phone] fax [phone]\par  \par  =======================================================\par  Contacts:\par  \par  Vonnie (daughter): [phone] - takes all healthcare related calls\par  \par  ======================================================= [de-identified] : Fine Needle Aspiration Report - Auth (Verified)\par  Specimen(s) Submitted\par  1. AXILLA, LEFT, US GUIDED CORE BIOPSY AND FNA\par  2. LYMPH NODE, CERVICAL, LEFT POSTERIOR, US GUIDED CORE BIOPSY AND FNA\par  \par  \par  Final Diagnosis\par  1. AXILLA, LEFT, US GUIDED CORE BIOPSY AND FNA\par  POSITIVE FOR MALIGNANT CELLS.\par  B-cell lymphoma of follicular center origin, most consistent with\par  follicular lymphoma, grade 3A.  See note.\par  \par  Fine Needle Aspiration Addendum Report - Auth (Verified)\par  \par  Addendum\par  2. LYMPH NODE, CERVICAL, LEFT POSTERIOR, US GUIDED CORE BIOPSY AND FNA\par  POSITIVE FOR MALIGNANT CELLS.\par  B-cell lymphoma of follicular center origin with high proliferation index.\par  See note.\par  \par  Note:\par  The neoplastic B cells demonstrate a follicular center B-cell phenotype with MUM-1 co-expression and a high proliferation index.  Follicular dendritic meshwork is present in most areas of the biopsy, consistent\par  with follicular lymphoma.  However, there is one focal area with increased larger cells and lack of follicular dendritic meshwork. Therefore, a high grade component can not be excluded.  If clinically indicated, suggest excisional biopsy for definitive classification.\par  \par  Immunohistochemical stains   (CD3, CD5, CD10, CD20, CD21, CD23, CD30, BCL-6, BCL-2, cyclinD1, ki-67, c-MYC, PAX-5, MUM-1, p53, ISAURO) were performed on block 2C.  The neoplastic cells are positive for CD20, PAX-5, BCL-6, BCL-2, MUM-1 (partial), dim p53; negative CD5, CD10, CD30, cyclinD1, ISAURO.  CD21 and CD23 highlight follicular dendritic meshwork in most areas with focal absence of follicular dendritic meshwork. \par  Ki-67 proliferation index is approximately 60 to 70%.  C-MYC stains approximately 20 to 30% of cells.  CD3 and CD5 highlight some T-cells in the background. [de-identified] : PENDING [de-identified] : 6/14/22 FNA of Left axilla LN: FLUORESCENCE IN-SITU HYBRIDIZATION (FISH)\par  1. No evidence of MYC Rearrangement\par  2. No evidence of BCL2-IGH [translocation t(14;18)] gene rearrangement\par  3. 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 [de-identified] : 5/18/22: Initial Visit.  6/20/22: Follow-up. Patient feels well overall. Lymphoma labs and left axilla LN biopsy revealed grade 3A follicular lymphoma, IgG Lambda and Kappa MGUS. He reports lymph nodes have been stable. Heart function is stable. He denies having any recent fevers, chills, nausea. No weight changes.  8/22/22: Follow-up. Patient feels well overall. Awaiting for biopsy results of lymph nodes in his back. He does not have pain in the left back. The left side of his neck gives him discomfort. He has never received chemotherapy nor antibody treatment in the past. No fevers, chills, night sweats. No new noticeable masses  Good appetite, lost 7 lbs (lost a lot of fluid weight due to diuretics).   10/17/22: Follow up. In the interim, he was hospitalized at [facility] twice (8/27-9/12 & 9/16-10/3). 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 Patient was treated with rasburicase, but developed rasburicase-triggered G6PD hemolysis. Also found to be in acute exacerbation of HFrEF and have a prolonged QTc (likely medication-induced). During hospital stay was found to have altered mental status. 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 In mid-September patient was hospitalized for Encephalopathy (+Rhinovirus/enterovirus) unrelated to the Lymphoma. Today, he feels at baseline. Notes resolution of cervical LNs, and back lesions since starting treatment. Patient denies fever, chills, night sweats, back pain, abdominal pain, chest pain, or shortness of breath. Fair appetite, weight loss due to prolonged hospitalizations.  11/17/22: Follow-up. On 10/28/22 at home was found down by his daughter found to have fever, STEPHEN and AMS and was admitted for acute metabolic encephalopathy with sepsis 2/2 pneumonia s/p 7 days zosyn with improvement. Today he presents from rehab with his daughter. He is not feeling well. He feels that he is weak and fatigued. He has been doing PT / walking around the facility. He reads when awake. He has a poor appetite, but his family is bringing in food that he likes. No fevers, chills, night sweats. No new lumps or masses.  2/6/23: Follow-up. He was readmitted [facility] 1/9/23 to 1/20/23 for malaise and cytopenias. In December 2022 he had pneumonia and COVID19 for which there has been a long recovery. Due to these complications, his treatment with chemoimmunotherapy (O-miniCHOP) was discontinued. He is currently staying in a rehab to improve his performance status. He overall feels well today and reports feeling much improved relative to Dec 2022. He eats 3 meals a day, but prefers to eat late at night. His appetite is good and his daughter brings a lot of food supplementation for him. He continues to lose weight however. No other recent illnesses. He had a PET-CT last week.  3/13/23: Followup. He has not yet rec'd revlimid but is now approved for free drug. Continues to have issues gaining weight, and reports a very good appetite. He has not lost weight at least. He also has been having right foot pain between the ankle and toes since the night of 3/6/23. He also has a rash on toes 1-3. He continues to have right lateral abdominal itchiness around a site of swelling / lump. This has not changed in the past month. He is now back home. He is able to ambulate with a walker. He is not limited by dyspnea. He had edema in the rehab which has resolved. Per family he has also been having intermittent periods where he is not himself and he becomes aggressive toward family members. This has been an ongoing issue for sometime   3/30/23: Follow-up. Today is Cycle 1 Day 1 of Tafa. He has not yet rec'd revlimid (expected delivery today); is approved for free drug via [employer]. Reports intentional weight gain over the past couple of weeks and ambulating with a walker. Continues to have right foot pain between the ankle and toes since the night of 3/6/23, and have right lateral abdominal itchiness around a site of swelling / lump. This has not changed in the past month. He is now back home. Per family he has intermittent periods where he is not himself and he becomes aggressive toward family members.   4/6/23: Follow-up. Today is Cycle 1 Day 8 of Tafasitamab. Did receive revlimid on day 1 and has been tolerating Revlimid well. Denies any abdominal issues, continues to have a good appetite. Ambulating with a walker but continues to have right foot pain, and have right lateral abdominal itchiness around a site of swelling / lump. Per family he has intermittent periods where he is not himself and he becomes aggressive toward family members.   4/13/23: Follow-up. Today is Cycle 1 Day 15 of Tafasitamab. He is tolerating the regimen well. Denies any abdominal issues, continues to have a good appetite. Ambulating with a walker but continues to have right foot pain, and have right lateral abdominal itchiness around a site of swelling / lump. Per family he has intermittent periods where he is not himself and he becomes aggressive toward family members.   4/20/23: Follow-up. Today is Cycle 1 Day 22. Today he reports feeling well, but has noticed his right leg / foot is swollen and associated with shoe tightness. He reports that he has been taking his apixaban which he takes for cardiac reasons. His ANC is 0, but denies any mucositis, or other infectious issues. No new lumps or masses. His right abd mass is decreasing in size.  4/27/23: Follow-up. Today is Cycle 1 Day 29. After receiving zarxio he developed a facial rash which is now self resolved. He remains off of Revlimid due to neutropenia. Has the mediport insertion scheduled for May 9th. Today he reports feeling well, his right leg / foot remains stable and swollen; associated with shoe tightness. US Duplex (4/20/23) negative for DVT. He reports that he has been taking his apixaban which he takes for cardiac reasons. Denies any mucositis, or other infectious issues. No new lumps or masses. His right abd mass is decreasing in size.  5/4/23: Follow-up. Today is Cycle 1 Day 36; has been unable to receive Tafa due to peripheral access limitation therefore cycle 2 has not been counted. He has restarted Revlimid at a lowered dose of 15mg due to neutropenia, tolerating well without neutropenia thus far. He has the mediport insertion scheduled for May 9th. Today he reports feeling well, his right leg / foot remains stable and swollen;US Duplex (4/20/23) negative for DVT. Remains on apixaban which he takes for cardiac reasons. Denies any mucositis, or other infectious issues. No new lumps or masses. His right abd mass is no longer palpable.  5/25/23: Follow-up. Today is Cycle 2 Day 15. He has a mediport in place now and is able to receive the Tafasitamab without issue, tolerating Revlimid well at a lowered dose of 15mg due to neutropenia. Today he reports feeling well, his intermittent right leg swelling is much improved. Has been hydrating well recently, given elevated BUN. Remains on apixaban which he takes for cardiac reasons. Denies any bleeding, mucositis, masses/lumps, fever, chills, or night sweats. Good appetite.  6/19/23: Follow-up. Today is Cycle 3 Day 12 of Tafa + Revlimid. Due to neutropenia about 2 weeks ago he was again advised to hold the Revlimid until further notice. He has been having swelling in his hands, left > right with significant pain (gout-like) in the left thumb. He developed a sore throat yesterday at home and was using honey to help soothe. Today he only has a sore throat when swallowing liquids. He was aslo having chills, confused and having visual hallucinations.  He denies chest pain or shortness of breath. He ambulated into the center today, per daughter he has been walking much slower. He denies any issues with bowel or urinary function. He reports he is hydrating a couple times a day per daughter up to 500 mL a day. He is still off revlimid. Blood pressure rechecked 86/58.  7/26/23: Follow-up. He feels well today. He has noticed all of his prior lymphadenopathy disappear. He is not noticing any new lumps or masses. He denies constitutional issues. His neutrophils remain in the severely low range, however he has not developed any infections. He has been taking abx ppx. We will hold tafa as it can also negatively affect neutrophils. He has a left epicondylar mass that he states he has had for a very long time (years), which we will watch.  8/16/23: Follow-up. Mr Skaggs presents today with his daughter. He just arrived from seeing a neurologist, Dr Childers regarding his memory issues and occasional personality changes. Vonnie his daughter states that the past month has been better. He also has been evaluated at the [facility] clinic in [place] for his past exposures and is awaiting word on the status of that. His left elbow area swelling has not changed. He has not noticed any new lumps or masses. No constitutional issues. His neutrophils have since recovered and were likely low primarily due to Monjuvi (tafa). ROS was negative for other issues.  A comprehensive review of systems was performed including constitutional, eyes, ENT, cardiovascular, respiratory, gastrointestinal, genitourinary, musculoskeletal, integumentary, neurological, psychiatric and hematologic / lymphatic. All pertinent positives are included in the H&P under interval history above and the remaining review of systems listed are negative.   ==================================================== Treatment Hx:  Obinutuzumab-mini-COEP q21 days x 3 cycles (incomplete due to toxicities and patient preference to switch to more tolerable regimen) (Obinutuzumab modified mini-COEP: 400 mg/m cyclophosphamide, Etoposide (40% dose reduced to 30 mg/m2 IV on day 1 and 60 mg/m2 PO on days 2 and 3 of each cycle), and 2 mg vincristine (flat dose) on day 1 of each cycle, and 100 mg prednisone on days 1-5. Modified from Man et al 2009 Blood "R-CHOP with Etoposide Substituted for Doxorubicin (R-CEOP): Excellent Outcome in Diffuse Large B Cell Lymphoma for Patients with a Contraindication to Anthracyclines." with mini- dosing for elderly patients) - Cycle 1 Day 1 given 9/2/22 - third dose of Obinutuzumab held due to AMS, requiring admission to [facility] found to have enterovirus infection - Cycle 2 Day 1 given 9/30/22 - Given as an inpatient at [facility] - Cycle 3 Day 1 given 10/21/22 - Complicated by pneumonia, requiring admission and rehab placement  Tafasitamab plus Revlimid (changed therapy due to patient and family's wishes due to intolerable toxicities), On 28 day cycles. - Cycle 1 Day 1 on 3/30/23 (HELD revlimid briefly starting 4/20/23 due to neutropenia) - Cycle 2 Day 1 on 5/11/23 (HELD Revlimid since end of May, not yet restarted due to prolonged neutropenia) - Cycle 3 Day 1 on 6/8/23 (On 6/29/23 during cycle 3 he had experienced hypotension down to 86/58 in setting of sore throat and chills, confusion with visual hallucinations in the setting of neutropenia and was sent to [facility] ED, delayed tx 1 week) - Cycle 4 day 1 on 7/15/23 - Cycle 5 Day 1 was HELD on 8/12/23 for persistent severe neutropenia without infection / complications, delayed until 8/26/23  ==================================================== [FreeTextEntry1] : Tafasitamab plus Revlimid 20 mg daily 21/28 days. Revlimid was held mid May 2023 to the end of Aug 2023 due to Neutropenia. Also Tafasitamab has been intermittently held due to neutropenia, will resume on 8/26/23

## 2023-08-22 NOTE — PHYSICAL EXAM
[Ambulatory and capable of all self care but unable to carry out any work activities] : Status 2- Ambulatory and capable of all self care but unable to carry out any work activities. Up and about more than 50% of waking hours [Thin] : thin [Normal] : affect appropriate [de-identified] : Ambulating [de-identified] : +PPM [de-identified] : RLE trace edema [de-identified] : Left epicondylar mass

## 2023-08-22 NOTE — ASSESSMENT
[Palliative] : Goals of care discussed with patient: Palliative [FreeTextEntry1] : 74 yo male with PMHx significant for follicular lymphoma with DLBCL (tx with R-CHOP in 2003, with relapse in 2009, treated with BR) with his initial presentation at [facility] with multiple areas of palpable lymphadenopathy with follicular lymphoma grade IIIA (IPI score 3) in the setting of a steady decline in weight > 20 lbs in the previous 6 months without other constitutional complaints. 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   Due to symptoms / clinical picture, he was started on therapy. He is s/p 3 cycles of obinutuzumab + mini-CHOP, however this was d/c'd due to intolerability / complications. He is now on second-line therapy with Tafasitamab (anti-CD19) + lenalidomide (15 mg 21/28 days) since 3/30/23. Due to severe neutropenia, lenalidomide has been held since May 2023 and he will skip 8/12/23 tafa treatment with the next treatment planned for 8/26/23 if neutrophils recovery > 500. Today is Cycle 4 Day 12 of Tafasitamab + lenalidomide.  Plan: - Repeat Lymphoma labs once per cycle, monitor CBC each clinic or tx room visit (Pre-F) - Continue Tafasitamab 12 mg/kg every 2 weeks until progression or intolerance once cleared of an acute process. - HOLD Revlimid. HOLD Tafa on 8/12/23. Once neutrophils recovery will change dose to 10 mg x 21 days. CBC with every treatment visit to monitor for neutropenia. - VTE ppx while on Revlimid; on Apixaban for cardiac issues, Continue 5 mg q12hrs.  - Continue Allopurinol - Antiviral ppx: Acyclovir 400 mg BID - Neutropenia ppx: If ANC < 1.0 and expected to remain low for >= 7 days, take Levaquin 500 mg daily. - Behavioral issues: Suspect dementia as he has had fairly consistent times of confusion and aggressive behavior over the past 7 months at least. No concrete evidence of lymphoma CNS involvement at present. Established care with Dr Childers (neuro-onc). - Follow up monthly or sooner as needed for any issue  ___ I personally have spent a total of 40 minutes of time on the date of this encounter reviewing test results, documenting findings, providing education, coordinating care and directly consulting with the patient and/or designated family member.

## 2023-08-26 ENCOUNTER — RESULT REVIEW (OUTPATIENT)
Age: 73
End: 2023-08-26

## 2023-08-26 ENCOUNTER — APPOINTMENT (OUTPATIENT)
Dept: INFUSION THERAPY | Facility: HOSPITAL | Age: 73
End: 2023-08-26

## 2023-08-26 LAB
ALBUMIN SERPL ELPH-MCNC: 4 G/DL — SIGNIFICANT CHANGE UP (ref 3.3–5)
ALP SERPL-CCNC: 121 U/L — HIGH (ref 40–120)
ALT FLD-CCNC: 13 U/L — SIGNIFICANT CHANGE UP (ref 10–45)
ANION GAP SERPL CALC-SCNC: 14 MMOL/L — SIGNIFICANT CHANGE UP (ref 5–17)
AST SERPL-CCNC: 18 U/L — SIGNIFICANT CHANGE UP (ref 10–40)
BASOPHILS # BLD AUTO: 0.01 K/UL — SIGNIFICANT CHANGE UP (ref 0–0.2)
BASOPHILS NFR BLD AUTO: 0.1 % — SIGNIFICANT CHANGE UP (ref 0–2)
BILIRUB SERPL-MCNC: 0.5 MG/DL — SIGNIFICANT CHANGE UP (ref 0.2–1.2)
BUN SERPL-MCNC: 26 MG/DL — HIGH (ref 7–23)
CALCIUM SERPL-MCNC: 9.1 MG/DL — SIGNIFICANT CHANGE UP (ref 8.4–10.5)
CHLORIDE SERPL-SCNC: 108 MMOL/L — SIGNIFICANT CHANGE UP (ref 96–108)
CO2 SERPL-SCNC: 23 MMOL/L — SIGNIFICANT CHANGE UP (ref 22–31)
CREAT SERPL-MCNC: 1.19 MG/DL — SIGNIFICANT CHANGE UP (ref 0.5–1.3)
EGFR: 65 ML/MIN/1.73M2 — SIGNIFICANT CHANGE UP
EOSINOPHIL # BLD AUTO: 0.25 K/UL — SIGNIFICANT CHANGE UP (ref 0–0.5)
EOSINOPHIL NFR BLD AUTO: 3.5 % — SIGNIFICANT CHANGE UP (ref 0–6)
GLUCOSE SERPL-MCNC: 147 MG/DL — HIGH (ref 70–99)
HCT VFR BLD CALC: 33.6 % — LOW (ref 39–50)
HGB BLD-MCNC: 11.5 G/DL — LOW (ref 13–17)
IMM GRANULOCYTES NFR BLD AUTO: 1.3 % — HIGH (ref 0–0.9)
LDH SERPL L TO P-CCNC: 220 U/L — SIGNIFICANT CHANGE UP (ref 50–242)
LYMPHOCYTES # BLD AUTO: 0.55 K/UL — LOW (ref 1–3.3)
LYMPHOCYTES # BLD AUTO: 7.7 % — LOW (ref 13–44)
MAGNESIUM SERPL-MCNC: 1.6 MG/DL — SIGNIFICANT CHANGE UP (ref 1.6–2.6)
MCHC RBC-ENTMCNC: 31.5 PG — SIGNIFICANT CHANGE UP (ref 27–34)
MCHC RBC-ENTMCNC: 34.2 G/DL — SIGNIFICANT CHANGE UP (ref 32–36)
MCV RBC AUTO: 92.1 FL — SIGNIFICANT CHANGE UP (ref 80–100)
MONOCYTES # BLD AUTO: 0.54 K/UL — SIGNIFICANT CHANGE UP (ref 0–0.9)
MONOCYTES NFR BLD AUTO: 7.6 % — SIGNIFICANT CHANGE UP (ref 2–14)
NEUTROPHILS # BLD AUTO: 5.67 K/UL — SIGNIFICANT CHANGE UP (ref 1.8–7.4)
NEUTROPHILS NFR BLD AUTO: 79.8 % — HIGH (ref 43–77)
NRBC # BLD: 0 /100 WBCS — SIGNIFICANT CHANGE UP (ref 0–0)
PHOSPHATE SERPL-MCNC: 3.1 MG/DL — SIGNIFICANT CHANGE UP (ref 2.5–4.5)
PLATELET # BLD AUTO: 90 K/UL — LOW (ref 150–400)
POTASSIUM SERPL-MCNC: 3.6 MMOL/L — SIGNIFICANT CHANGE UP (ref 3.5–5.3)
POTASSIUM SERPL-SCNC: 3.6 MMOL/L — SIGNIFICANT CHANGE UP (ref 3.5–5.3)
PROT SERPL-MCNC: 6.8 G/DL — SIGNIFICANT CHANGE UP (ref 6–8.3)
RBC # BLD: 3.65 M/UL — LOW (ref 4.2–5.8)
RBC # FLD: 13.6 % — SIGNIFICANT CHANGE UP (ref 10.3–14.5)
SODIUM SERPL-SCNC: 145 MMOL/L — SIGNIFICANT CHANGE UP (ref 135–145)
URATE SERPL-MCNC: 6.7 MG/DL — SIGNIFICANT CHANGE UP (ref 3.4–8.8)
WBC # BLD: 7.11 K/UL — SIGNIFICANT CHANGE UP (ref 3.8–10.5)
WBC # FLD AUTO: 7.11 K/UL — SIGNIFICANT CHANGE UP (ref 3.8–10.5)

## 2023-08-29 ENCOUNTER — OUTPATIENT (OUTPATIENT)
Dept: OUTPATIENT SERVICES | Facility: HOSPITAL | Age: 73
LOS: 1 days | Discharge: HOME | End: 2023-08-29

## 2023-08-29 DIAGNOSIS — C82.20 FOLLICULAR LYMPHOMA GRADE III, UNSPECIFIED, UNSPECIFIED SITE: ICD-10-CM

## 2023-08-29 DIAGNOSIS — Z95.0 PRESENCE OF CARDIAC PACEMAKER: Chronic | ICD-10-CM

## 2023-08-29 DIAGNOSIS — C85.90 NON-HODGKIN LYMPHOMA, UNSPECIFIED, UNSPECIFIED SITE: Chronic | ICD-10-CM

## 2023-09-05 ENCOUNTER — NON-APPOINTMENT (OUTPATIENT)
Age: 73
End: 2023-09-05

## 2023-09-05 ENCOUNTER — APPOINTMENT (OUTPATIENT)
Dept: ELECTROPHYSIOLOGY | Facility: CLINIC | Age: 73
End: 2023-09-05
Payer: MEDICARE

## 2023-09-05 PROCEDURE — 93296 REM INTERROG EVL PM/IDS: CPT

## 2023-09-05 PROCEDURE — 93294 REM INTERROG EVL PM/LDLS PM: CPT

## 2023-09-06 ENCOUNTER — APPOINTMENT (OUTPATIENT)
Dept: HEMATOLOGY ONCOLOGY | Facility: CLINIC | Age: 73
End: 2023-09-06
Payer: MEDICARE

## 2023-09-06 ENCOUNTER — RESULT REVIEW (OUTPATIENT)
Age: 73
End: 2023-09-06

## 2023-09-06 VITALS
WEIGHT: 161.6 LBS | DIASTOLIC BLOOD PRESSURE: 69 MMHG | RESPIRATION RATE: 16 BRPM | TEMPERATURE: 98.8 F | SYSTOLIC BLOOD PRESSURE: 148 MMHG | OXYGEN SATURATION: 99 % | HEART RATE: 75 BPM | BODY MASS INDEX: 22.54 KG/M2

## 2023-09-06 LAB
BASOPHILS # BLD AUTO: 0.02 K/UL — SIGNIFICANT CHANGE UP (ref 0–0.2)
BASOPHILS NFR BLD AUTO: 0.4 % — SIGNIFICANT CHANGE UP (ref 0–2)
EOSINOPHIL # BLD AUTO: 0.4 K/UL — SIGNIFICANT CHANGE UP (ref 0–0.5)
EOSINOPHIL NFR BLD AUTO: 7.6 % — HIGH (ref 0–6)
HCT VFR BLD CALC: 31.4 % — LOW (ref 39–50)
HGB BLD-MCNC: 10.4 G/DL — LOW (ref 13–17)
IMM GRANULOCYTES NFR BLD AUTO: 0.9 % — SIGNIFICANT CHANGE UP (ref 0–0.9)
LYMPHOCYTES # BLD AUTO: 0.56 K/UL — LOW (ref 1–3.3)
LYMPHOCYTES # BLD AUTO: 10.6 % — LOW (ref 13–44)
MCHC RBC-ENTMCNC: 30.9 PG — SIGNIFICANT CHANGE UP (ref 27–34)
MCHC RBC-ENTMCNC: 33.1 G/DL — SIGNIFICANT CHANGE UP (ref 32–36)
MCV RBC AUTO: 93.2 FL — SIGNIFICANT CHANGE UP (ref 80–100)
MONOCYTES # BLD AUTO: 0.68 K/UL — SIGNIFICANT CHANGE UP (ref 0–0.9)
MONOCYTES NFR BLD AUTO: 12.9 % — SIGNIFICANT CHANGE UP (ref 2–14)
NEUTROPHILS # BLD AUTO: 3.58 K/UL — SIGNIFICANT CHANGE UP (ref 1.8–7.4)
NEUTROPHILS NFR BLD AUTO: 67.6 % — SIGNIFICANT CHANGE UP (ref 43–77)
NRBC # BLD: 0 /100 WBCS — SIGNIFICANT CHANGE UP (ref 0–0)
PLATELET # BLD AUTO: 98 K/UL — LOW (ref 150–400)
RBC # BLD: 3.37 M/UL — LOW (ref 4.2–5.8)
RBC # FLD: 14 % — SIGNIFICANT CHANGE UP (ref 10.3–14.5)
WBC # BLD: 5.29 K/UL — SIGNIFICANT CHANGE UP (ref 3.8–10.5)
WBC # FLD AUTO: 5.29 K/UL — SIGNIFICANT CHANGE UP (ref 3.8–10.5)

## 2023-09-06 PROCEDURE — 99214 OFFICE O/P EST MOD 30 MIN: CPT

## 2023-09-06 NOTE — PHYSICAL EXAM
[Ambulatory and capable of all self care but unable to carry out any work activities] : Status 2- Ambulatory and capable of all self care but unable to carry out any work activities. Up and about more than 50% of waking hours [Thin] : thin [Normal] : affect appropriate [de-identified] : Ambulating [de-identified] : +PPM [de-identified] : RLE trace edema [de-identified] : Left epicondylar mass

## 2023-09-07 NOTE — HISTORY OF PRESENT ILLNESS
[Disease:__________________________] : Disease: [unfilled] [Treatment Protocol] : Treatment Protocol [de-identified] : Initial Presentation:  71 yo with MHx significant for Atrial flutter (on Apixaban), HTN, here for further evaluation of low-level neutrophilia (since 1/2022) and lymphadenopathy. Previously NHL (around [id number]?). He received chemotherapy in 2004. 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 In 2010 he went to [facility] and was treated for reoccurrence and was treated with bendamustine. He reports that he has had areas of swelling in his neck on and off for about 2 years which was mostly undergoing workup with his endocrinologist. In the last few months he has noticed increasing size of his left cervical LNs and a right nodule that caused swelling of his face, which has now spontaneously decreased in size significantly.  Today he reports he feels well outside of weight loss. He denies any other constitutional complaints. He weighed 192 lbs in 10/2021 which was down to 183 lbs in December 12/2021(2 months later) which has further decreased down to 179 lbs today. He has not felt himself masses outside of his neck region. On 5/14/22, he went for US duplex of his left lower extremity due to a "tangerine size lump" on the back of his left thigh, which noted a 4.4 cm hypoechoic mass in his left inguinal region without commenting on his perceived posterior thigh mass. Also, on 5/2/22 he underwent US of his thyroid and parathyroid glands where they noticed bilateral cervical lymph nodes with the largest on the left side measuring 2.1 x 1.6 x 1.9 cm and a right level 1 LN measuring 2.2 x 1.2 x 2.3 cm. They saw 3 lymph nodes on the left side and one discrete LN on the right.   He also recently just finished a 14 day course of Levofloxacin for an uncomplicated phenomena. He was having a dry cough and underwent imaging as an outpatient which found mild left and right pleural effusions, areas of consolidation on the right and retrocardiac airspace involvement (performed 5/2/22). He now feels better without infectious complaints.  In addition, he is currently anemic. He last had a colonoscopy in his recent memory. He had bleeding hemorrhoids last year treated with OTC medications. He denies any tick bites recently.   He also has MGUS with 3 separate monoclonal gammopathies I suspect is related to his NHL. He has M Judd 1 showing IgG Lambda at 0.3 g/dl, M Judd 2 showing IgG Kappa at 0.2 g/dl, M Judd 3 showing IgM Lambda (unable to quantitate). There is no suspicion for myeloma or waldenstroms at this time and his M-spikes will be monitored. He has no elevation in kappa/ lambda FLC ration, but individual light chains are both elevated, kappa at 10.77 mg/dL and lambda at 6.58 mg/dL.  Social Hx - He is currently retired. He used to be an . - No significant environmental exposure to chemicals - Lives by himself and his wife lives in [place]. - Former smoker. He smoked for 10 years less than 1 pack a day. He quit 20 years ago  Family hx - Two brothers non Hodgkins lymphoma. At least one required chemotherapy. sister had cervical cancer first diagnosed 2007 and then 3 years later with more cancer discovered - Mother and father passed away from heart disease and diabetes.  ======================================================= Interval Hx update:  He underwent PET-CT in Aug 2022 which showed FDG avid lymph nodes in the left cervical, bilateral supraclavicular regions, and chest, and a few FDG avid abdominal lymph nodes, intramuscular masses in the left posterior chest and left upper thigh, scattered FDG avid subcutaneous soft tissue/nodules with trace right pleural effusion, moderate left pleural effusion, loculated fluid in the right middle lobe. Moderate abdominal and pelvic ascites. Subcutaneous edema in the lower abdominal wall extending into the imaged bilateral thighs. Splenomegaly with mild FDG avidity, suspicious for lymphomatous involvement.  Prior to the PET-CT, he underwent biopsy of both a cervical lymph node and a left axilla lymph node. 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 There were areas of > 20 SUV on the PET-CT, repeat whole lymph node biopsy was requested to confirm suspected diagnosis of Grade 3B FL or transformed large cell lymphoma. S/p excisional biopsy of back lesion on 9/7/22 which showed recurrent DLBCL.  LP 9/26/22: protein elevated at 61, LDH 27, neutrophils 0, lymphocytes 33, monocytes 67, RBC 5. Oligoclonal bands negative. The flow cytometry failed due to insufficient cells. Recommended continued IT MTX therapy x 4 total cycles. He had an interim PET-CT performed on 2/1/23 (3 months after discontinuation of O-miniCHOP in the middle of his therapy - received 3/6 cycles) which showed possible persistent disease including a small, residual focus of increased FDG activity in the left level II region, likely corresponding to a difficult to delineate lymph node, is decreased in size and metabolism (SUV 3.5; image 33; previous SUV 16.1 and skin thickening and subcutaneous nodules, right lower anterior and lateral abdominal wall, slightly more extensive and similar in metabolism (SUV 7.1; image 170; previous SUV 5.5). Discontinued chemoimmunotherapy with Obi-miniCHOP in Nov 2022 due to treatment complications, hospitalizations, poor PS. He was subsequently started on Tafasitamab (anti-CD19) + lenalidomide since 3/30/23. Lenalidomide dose reduced from 20 mg to 15 mg due to episodes of severe neutropenia.  ======================================================= Care Providers:  PMD/ Geriatrician: Dr Allison; Tel: [phone] Endo: Dr Carter Vigil Cardiologist: Dr. Don [phone] fax [phone]  ======================================================= Contacts:  Vonnie (daughter): [phone] - takes all healthcare related calls  ======================================================= [de-identified] : PENDING [de-identified] : Fine Needle Aspiration Report - Auth (Verified)\par  Specimen(s) Submitted\par  1. AXILLA, LEFT, US GUIDED CORE BIOPSY AND FNA\par  2. LYMPH NODE, CERVICAL, LEFT POSTERIOR, US GUIDED CORE BIOPSY AND FNA\par  \par  \par  Final Diagnosis\par  1. AXILLA, LEFT, US GUIDED CORE BIOPSY AND FNA\par  POSITIVE FOR MALIGNANT CELLS.\par  B-cell lymphoma of follicular center origin, most consistent with\par  follicular lymphoma, grade 3A.  See note.\par  \par  Fine Needle Aspiration Addendum Report - Auth (Verified)\par  \par  Addendum\par  2. LYMPH NODE, CERVICAL, LEFT POSTERIOR, US GUIDED CORE BIOPSY AND FNA\par  POSITIVE FOR MALIGNANT CELLS.\par  B-cell lymphoma of follicular center origin with high proliferation index.\par  See note.\par  \par  Note:\par  The neoplastic B cells demonstrate a follicular center B-cell phenotype with MUM-1 co-expression and a high proliferation index.  Follicular dendritic meshwork is present in most areas of the biopsy, consistent\par  with follicular lymphoma.  However, there is one focal area with increased larger cells and lack of follicular dendritic meshwork. Therefore, a high grade component can not be excluded.  If clinically indicated, suggest excisional biopsy for definitive classification.\par  \par  Immunohistochemical stains   (CD3, CD5, CD10, CD20, CD21, CD23, CD30, BCL-6, BCL-2, cyclinD1, ki-67, c-MYC, PAX-5, MUM-1, p53, ISAURO) were performed on block 2C.  The neoplastic cells are positive for CD20, PAX-5, BCL-6, BCL-2, MUM-1 (partial), dim p53; negative CD5, CD10, CD30, cyclinD1, ISAURO.  CD21 and CD23 highlight follicular dendritic meshwork in most areas with focal absence of follicular dendritic meshwork. \par  Ki-67 proliferation index is approximately 60 to 70%.  C-MYC stains approximately 20 to 30% of cells.  CD3 and CD5 highlight some T-cells in the background. [de-identified] : 6/14/22 FNA of Left axilla LN: FLUORESCENCE IN-SITU HYBRIDIZATION (FISH)\par  1. No evidence of MYC Rearrangement\par  2. No evidence of BCL2-IGH [translocation t(14;18)] gene rearrangement\par  3. 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 [de-identified] : 5/18/22: Initial Visit.  6/20/22: Follow-up. Patient feels well overall. Lymphoma labs and left axilla LN biopsy revealed grade 3A follicular lymphoma, IgG Lambda and Kappa MGUS. He reports lymph nodes have been stable. Heart function is stable. He denies having any recent fevers, chills, nausea. No weight changes.  8/22/22: Follow-up. Patient feels well overall. Awaiting for biopsy results of lymph nodes in his back. He does not have pain in the left back. The left side of his neck gives him discomfort. He has never received chemotherapy nor antibody treatment in the past. No fevers, chills, night sweats. No new noticeable masses  Good appetite, lost 7 lbs (lost a lot of fluid weight due to diuretics).   10/17/22: Follow up. In the interim, he was hospitalized at [facility] twice (8/27-9/12 & 9/16-10/3). 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 Patient was treated with rasburicase, but developed rasburicase-triggered G6PD hemolysis. Also found to be in acute exacerbation of HFrEF and have a prolonged QTc (likely medication-induced). During hospital stay was found to have altered mental status. 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 In mid-September patient was hospitalized for Encephalopathy (+Rhinovirus/enterovirus) unrelated to the Lymphoma. Today, he feels at baseline. Notes resolution of cervical LNs, and back lesions since starting treatment. Patient denies fever, chills, night sweats, back pain, abdominal pain, chest pain, or shortness of breath. Fair appetite, weight loss due to prolonged hospitalizations.  11/17/22: Follow-up. On 10/28/22 at home was found down by his daughter found to have fever, STEPHEN and AMS and was admitted for acute metabolic encephalopathy with sepsis 2/2 pneumonia s/p 7 days zosyn with improvement. Today he presents from rehab with his daughter. He is not feeling well. He feels that he is weak and fatigued. He has been doing PT / walking around the facility. He reads when awake. He has a poor appetite, but his family is bringing in food that he likes. No fevers, chills, night sweats. No new lumps or masses.  2/6/23: Follow-up. He was readmitted [facility] 1/9/23 to 1/20/23 for malaise and cytopenias. In December 2022 he had pneumonia and COVID19 for which there has been a long recovery. Due to these complications, his treatment with chemoimmunotherapy (O-miniCHOP) was discontinued. He is currently staying in a rehab to improve his performance status. He overall feels well today and reports feeling much improved relative to Dec 2022. He eats 3 meals a day, but prefers to eat late at night. His appetite is good and his daughter brings a lot of food supplementation for him. He continues to lose weight however. No other recent illnesses. He had a PET-CT last week.  3/13/23: Followup. He has not yet rec'd revlimid but is now approved for free drug. Continues to have issues gaining weight, and reports a very good appetite. He has not lost weight at least. He also has been having right foot pain between the ankle and toes since the night of 3/6/23. He also has a rash on toes 1-3. He continues to have right lateral abdominal itchiness around a site of swelling / lump. This has not changed in the past month. He is now back home. He is able to ambulate with a walker. He is not limited by dyspnea. He had edema in the rehab which has resolved. Per family he has also been having intermittent periods where he is not himself and he becomes aggressive toward family members. This has been an ongoing issue for sometime   3/30/23: Follow-up. Today is Cycle 1 Day 1 of Tafa. He has not yet rec'd revlimid (expected delivery today); is approved for free drug via [employer]. Reports intentional weight gain over the past couple of weeks and ambulating with a walker. Continues to have right foot pain between the ankle and toes since the night of 3/6/23, and have right lateral abdominal itchiness around a site of swelling / lump. This has not changed in the past month. He is now back home. Per family he has intermittent periods where he is not himself and he becomes aggressive toward family members.   4/6/23: Follow-up. Today is Cycle 1 Day 8 of Tafasitamab. Did receive revlimid on day 1 and has been tolerating Revlimid well. Denies any abdominal issues, continues to have a good appetite. Ambulating with a walker but continues to have right foot pain, and have right lateral abdominal itchiness around a site of swelling / lump. Per family he has intermittent periods where he is not himself and he becomes aggressive toward family members.   4/13/23: Follow-up. Today is Cycle 1 Day 15 of Tafasitamab. He is tolerating the regimen well. Denies any abdominal issues, continues to have a good appetite. Ambulating with a walker but continues to have right foot pain, and have right lateral abdominal itchiness around a site of swelling / lump. Per family he has intermittent periods where he is not himself and he becomes aggressive toward family members.   4/20/23: Follow-up. Today is Cycle 1 Day 22. Today he reports feeling well, but has noticed his right leg / foot is swollen and associated with shoe tightness. He reports that he has been taking his apixaban which he takes for cardiac reasons. His ANC is 0, but denies any mucositis, or other infectious issues. No new lumps or masses. His right abd mass is decreasing in size.  4/27/23: Follow-up. Today is Cycle 1 Day 29. After receiving zarxio he developed a facial rash which is now self resolved. He remains off of Revlimid due to neutropenia. Has the mediport insertion scheduled for May 9th. Today he reports feeling well, his right leg / foot remains stable and swollen; associated with shoe tightness. US Duplex (4/20/23) negative for DVT. He reports that he has been taking his apixaban which he takes for cardiac reasons. Denies any mucositis, or other infectious issues. No new lumps or masses. His right abd mass is decreasing in size.  5/4/23: Follow-up. Today is Cycle 1 Day 36; has been unable to receive Tafa due to peripheral access limitation therefore cycle 2 has not been counted. He has restarted Revlimid at a lowered dose of 15mg due to neutropenia, tolerating well without neutropenia thus far. He has the mediport insertion scheduled for May 9th. Today he reports feeling well, his right leg / foot remains stable and swollen;US Duplex (4/20/23) negative for DVT. Remains on apixaban which he takes for cardiac reasons. Denies any mucositis, or other infectious issues. No new lumps or masses. His right abd mass is no longer palpable.  5/25/23: Follow-up. Today is Cycle 2 Day 15. He has a mediport in place now and is able to receive the Tafasitamab without issue, tolerating Revlimid well at a lowered dose of 15mg due to neutropenia. Today he reports feeling well, his intermittent right leg swelling is much improved. Has been hydrating well recently, given elevated BUN. Remains on apixaban which he takes for cardiac reasons. Denies any bleeding, mucositis, masses/lumps, fever, chills, or night sweats. Good appetite.  6/19/23: Follow-up. Today is Cycle 3 Day 12 of Tafa + Revlimid. Due to neutropenia about 2 weeks ago he was again advised to hold the Revlimid until further notice. He has been having swelling in his hands, left > right with significant pain (gout-like) in the left thumb. He developed a sore throat yesterday at home and was using honey to help soothe. Today he only has a sore throat when swallowing liquids. He was aslo having chills, confused and having visual hallucinations.  He denies chest pain or shortness of breath. He ambulated into the center today, per daughter he has been walking much slower. He denies any issues with bowel or urinary function. He reports he is hydrating a couple times a day per daughter up to 500 mL a day. He is still off revlimid. Blood pressure rechecked 86/58.  7/26/23: Follow-up. He feels well today. He has noticed all of his prior lymphadenopathy disappear. He is not noticing any new lumps or masses. He denies constitutional issues. His neutrophils remain in the severely low range, however he has not developed any infections. He has been taking abx ppx. We will hold tafa as it can also negatively affect neutrophils. He has a left epicondylar mass that he states he has had for a very long time (years), which we will watch.  8/16/23: Follow-up. Mr Skaggs presents today with his daughter. He just arrived from seeing a neurologist, Dr Childers regarding his memory issues and occasional personality changes. Vonnie his daughter states that the past month has been better. He also has been evaluated at the [facility] clinic in [place] for his past exposures and is awaiting word on the status of that. His left elbow area swelling has not changed. He has not noticed any new lumps or masses. No constitutional issues. His neutrophils have since recovered and were likely low primarily due to Monjuvi (tafa). ROS was negative for other issues.  9/6/23: Follow up. Patient reports feeling well today. He gained 8lbs since last visit. His WBC 5.29, Hb 10.4, Plt 98 today. No concerning signs of symptoms today.  A comprehensive review of systems was performed including constitutional, eyes, ENT, cardiovascular, respiratory, gastrointestinal, genitourinary, musculoskeletal, integumentary, neurological, psychiatric and hematologic / lymphatic. All pertinent positives are included in the H&P under interval history above and the remaining review of systems listed are negative.   ==================================================== Treatment Hx:  Obinutuzumab-mini-COEP q21 days x 3 cycles (incomplete due to toxicities and patient preference to switch to more tolerable regimen) (Obinutuzumab modified mini-COEP: 400 mg/m cyclophosphamide, Etoposide (40% dose reduced to 30 mg/m2 IV on day 1 and 60 mg/m2 PO on days 2 and 3 of each cycle), and 2 mg vincristine (flat dose) on day 1 of each cycle, and 100 mg prednisone on days 1-5. Modified from Man et al 2009 Blood "R-CHOP with Etoposide Substituted for Doxorubicin (R-CEOP): Excellent Outcome in Diffuse Large B Cell Lymphoma for Patients with a Contraindication to Anthracyclines." with mini- dosing for elderly patients) - Cycle 1 Day 1 given 9/2/22 - third dose of Obinutuzumab held due to AMS, requiring admission to [facility] found to have enterovirus infection - Cycle 2 Day 1 given 9/30/22 - Given as an inpatient at [facility] - Cycle 3 Day 1 given 10/21/22 - Complicated by pneumonia, requiring admission and rehab placement  Tafasitamab plus Revlimid (changed therapy due to patient and family's wishes due to intolerable toxicities), On 28 day cycles. - Cycle 1 Day 1 on 3/30/23 (HELD revlimid briefly starting 4/20/23 due to neutropenia) - Cycle 2 Day 1 on 5/11/23 (HELD Revlimid since end of May, not yet restarted due to prolonged neutropenia) - Cycle 3 Day 1 on 6/8/23 (On 6/29/23 during cycle 3 he had experienced hypotension down to 86/58 in setting of sore throat and chills, confusion with visual hallucinations in the setting of neutropenia and was sent to [facility] ED, delayed tx 1 week) - Cycle 4 day 1 on 7/15/23 - Cycle 5 Day 1 was HELD on 8/12/23 for persistent severe neutropenia without infection / complications, delayed until 8/26/23 - Cycle 6 Day 1 on 9/23/23  ==================================================== [FreeTextEntry1] : Tafasitamab plus Revlimid 20 mg daily 21/28 days. Revlimid was held mid May 2023 to the end of Aug 2023 due to Neutropenia. Also Tafasitamab has been intermittently held due to neutropenia, will resume on 8/26/23

## 2023-09-07 NOTE — ASSESSMENT
[Palliative] : Goals of care discussed with patient: Palliative [FreeTextEntry1] : 72 yo male with PMHx significant for follicular lymphoma with DLBCL (tx with R-CHOP in 2003, with relapse in 2009, treated with BR) with his initial presentation at [facility] with multiple areas of palpable lymphadenopathy with follicular lymphoma grade IIIA (IPI score 3) in the setting of a steady decline in weight > 20 lbs in the previous 6 months without other constitutional complaints. 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   Due to symptoms / clinical picture, he was started on therapy. He is s/p 3 cycles of obinutuzumab + mini-CHOP, however this was d/c'd due to intolerability / complications. He is now on second-line therapy with Tafasitamab (anti-CD19) + lenalidomide (15 mg 21/28 days) since 3/30/23. Due to severe neutropenia, lenalidomide was held from May 2023 to early August 2023. Treatment was restarted upon neutrophil recovery on 8/26/23. Today is Cycle 5 Day 12 of Tafasitamab + lenalidomide.  Plan: - Repeat Lymphoma labs once per cycle, monitor CBC each clinic or tx room visit (Pre-F) - Continue Tafasitamab 12 mg/kg every 2 weeks until progression or intolerance once cleared of an acute process. Orders placed. - CBC with every treatment visit to monitor for neutropenia. - VTE ppx while on Revlimid; on Apixaban for cardiac issues, Continue 5 mg q12hrs.  - Continue Allopurinol - Antiviral ppx: Acyclovir 400 mg BID - Behavioral issues: Suspect mild dementia as he has had fairly consistent times of confusion and aggressive behavior over the past year. No concrete evidence of lymphoma CNS involvement at present. Established care with Dr Childers (neuro-onc). - Follow up monthly or sooner as needed for any issue  ___ I personally have spent a total of 30 minutes of time on the date of this encounter reviewing test results, documenting findings, providing education, coordinating care and directly consulting with the patient and/or designated family member.

## 2023-09-09 ENCOUNTER — RESULT REVIEW (OUTPATIENT)
Age: 73
End: 2023-09-09

## 2023-09-09 ENCOUNTER — APPOINTMENT (OUTPATIENT)
Dept: INFUSION THERAPY | Facility: HOSPITAL | Age: 73
End: 2023-09-09

## 2023-09-09 LAB
ALBUMIN SERPL ELPH-MCNC: 4.1 G/DL — SIGNIFICANT CHANGE UP (ref 3.3–5)
ALP SERPL-CCNC: 115 U/L — SIGNIFICANT CHANGE UP (ref 40–120)
ALT FLD-CCNC: 11 U/L — SIGNIFICANT CHANGE UP (ref 10–45)
ANION GAP SERPL CALC-SCNC: 13 MMOL/L — SIGNIFICANT CHANGE UP (ref 5–17)
AST SERPL-CCNC: 19 U/L — SIGNIFICANT CHANGE UP (ref 10–40)
BASOPHILS # BLD AUTO: 0.04 K/UL — SIGNIFICANT CHANGE UP (ref 0–0.2)
BASOPHILS NFR BLD AUTO: 0.9 % — SIGNIFICANT CHANGE UP (ref 0–2)
BILIRUB SERPL-MCNC: 0.5 MG/DL — SIGNIFICANT CHANGE UP (ref 0.2–1.2)
BUN SERPL-MCNC: 24 MG/DL — HIGH (ref 7–23)
CALCIUM SERPL-MCNC: 9 MG/DL — SIGNIFICANT CHANGE UP (ref 8.4–10.5)
CHLORIDE SERPL-SCNC: 108 MMOL/L — SIGNIFICANT CHANGE UP (ref 96–108)
CO2 SERPL-SCNC: 25 MMOL/L — SIGNIFICANT CHANGE UP (ref 22–31)
CREAT SERPL-MCNC: 1.25 MG/DL — SIGNIFICANT CHANGE UP (ref 0.5–1.3)
EGFR: 61 ML/MIN/1.73M2 — SIGNIFICANT CHANGE UP
EOSINOPHIL # BLD AUTO: 0.38 K/UL — SIGNIFICANT CHANGE UP (ref 0–0.5)
EOSINOPHIL NFR BLD AUTO: 8.9 % — HIGH (ref 0–6)
GLUCOSE SERPL-MCNC: 130 MG/DL — HIGH (ref 70–99)
HCT VFR BLD CALC: 32 % — LOW (ref 39–50)
HGB BLD-MCNC: 10.8 G/DL — LOW (ref 13–17)
IMM GRANULOCYTES NFR BLD AUTO: 1.2 % — HIGH (ref 0–0.9)
LDH SERPL L TO P-CCNC: 275 U/L — HIGH (ref 50–242)
LYMPHOCYTES # BLD AUTO: 0.55 K/UL — LOW (ref 1–3.3)
LYMPHOCYTES # BLD AUTO: 12.9 % — LOW (ref 13–44)
MAGNESIUM SERPL-MCNC: 1.5 MG/DL — LOW (ref 1.6–2.6)
MCHC RBC-ENTMCNC: 31.3 PG — SIGNIFICANT CHANGE UP (ref 27–34)
MCHC RBC-ENTMCNC: 33.8 G/DL — SIGNIFICANT CHANGE UP (ref 32–36)
MCV RBC AUTO: 92.8 FL — SIGNIFICANT CHANGE UP (ref 80–100)
MONOCYTES # BLD AUTO: 0.61 K/UL — SIGNIFICANT CHANGE UP (ref 0–0.9)
MONOCYTES NFR BLD AUTO: 14.3 % — HIGH (ref 2–14)
NEUTROPHILS # BLD AUTO: 2.64 K/UL — SIGNIFICANT CHANGE UP (ref 1.8–7.4)
NEUTROPHILS NFR BLD AUTO: 61.8 % — SIGNIFICANT CHANGE UP (ref 43–77)
NRBC # BLD: 0 /100 WBCS — SIGNIFICANT CHANGE UP (ref 0–0)
PHOSPHATE SERPL-MCNC: 2.8 MG/DL — SIGNIFICANT CHANGE UP (ref 2.5–4.5)
PLATELET # BLD AUTO: 97 K/UL — LOW (ref 150–400)
POTASSIUM SERPL-MCNC: 3.8 MMOL/L — SIGNIFICANT CHANGE UP (ref 3.5–5.3)
POTASSIUM SERPL-SCNC: 3.8 MMOL/L — SIGNIFICANT CHANGE UP (ref 3.5–5.3)
PROT SERPL-MCNC: 7 G/DL — SIGNIFICANT CHANGE UP (ref 6–8.3)
RBC # BLD: 3.45 M/UL — LOW (ref 4.2–5.8)
RBC # FLD: 13.9 % — SIGNIFICANT CHANGE UP (ref 10.3–14.5)
SODIUM SERPL-SCNC: 147 MMOL/L — HIGH (ref 135–145)
URATE SERPL-MCNC: 7.3 MG/DL — SIGNIFICANT CHANGE UP (ref 3.4–8.8)
WBC # BLD: 4.27 K/UL — SIGNIFICANT CHANGE UP (ref 3.8–10.5)
WBC # FLD AUTO: 4.27 K/UL — SIGNIFICANT CHANGE UP (ref 3.8–10.5)

## 2023-09-11 DIAGNOSIS — Z51.11 ENCOUNTER FOR ANTINEOPLASTIC CHEMOTHERAPY: ICD-10-CM

## 2023-09-11 DIAGNOSIS — R11.2 NAUSEA WITH VOMITING, UNSPECIFIED: ICD-10-CM

## 2023-09-23 ENCOUNTER — APPOINTMENT (OUTPATIENT)
Dept: INFUSION THERAPY | Facility: HOSPITAL | Age: 73
End: 2023-09-23

## 2023-09-23 ENCOUNTER — RESULT REVIEW (OUTPATIENT)
Age: 73
End: 2023-09-23

## 2023-09-23 LAB
ALBUMIN SERPL ELPH-MCNC: 4 G/DL — SIGNIFICANT CHANGE UP (ref 3.3–5)
ALP SERPL-CCNC: 114 U/L — SIGNIFICANT CHANGE UP (ref 40–120)
ALT FLD-CCNC: 11 U/L — SIGNIFICANT CHANGE UP (ref 10–45)
ANION GAP SERPL CALC-SCNC: 16 MMOL/L — SIGNIFICANT CHANGE UP (ref 5–17)
AST SERPL-CCNC: 20 U/L — SIGNIFICANT CHANGE UP (ref 10–40)
BASOPHILS # BLD AUTO: 0.13 K/UL — SIGNIFICANT CHANGE UP (ref 0–0.2)
BASOPHILS NFR BLD AUTO: 3 % — HIGH (ref 0–2)
BILIRUB SERPL-MCNC: 0.3 MG/DL — SIGNIFICANT CHANGE UP (ref 0.2–1.2)
BUN SERPL-MCNC: 18 MG/DL — SIGNIFICANT CHANGE UP (ref 7–23)
CALCIUM SERPL-MCNC: 9.2 MG/DL — SIGNIFICANT CHANGE UP (ref 8.4–10.5)
CHLORIDE SERPL-SCNC: 106 MMOL/L — SIGNIFICANT CHANGE UP (ref 96–108)
CO2 SERPL-SCNC: 22 MMOL/L — SIGNIFICANT CHANGE UP (ref 22–31)
CREAT SERPL-MCNC: 1.33 MG/DL — HIGH (ref 0.5–1.3)
EGFR: 57 ML/MIN/1.73M2 — LOW
EOSINOPHIL # BLD AUTO: 0.42 K/UL — SIGNIFICANT CHANGE UP (ref 0–0.5)
EOSINOPHIL NFR BLD AUTO: 9.7 % — HIGH (ref 0–6)
GLUCOSE SERPL-MCNC: 121 MG/DL — HIGH (ref 70–99)
HCT VFR BLD CALC: 33 % — LOW (ref 39–50)
HGB BLD-MCNC: 11 G/DL — LOW (ref 13–17)
IMM GRANULOCYTES NFR BLD AUTO: 2.3 % — HIGH (ref 0–0.9)
LDH SERPL L TO P-CCNC: 336 U/L — HIGH (ref 50–242)
LYMPHOCYTES # BLD AUTO: 0.64 K/UL — LOW (ref 1–3.3)
LYMPHOCYTES # BLD AUTO: 14.7 % — SIGNIFICANT CHANGE UP (ref 13–44)
MAGNESIUM SERPL-MCNC: 1.7 MG/DL — SIGNIFICANT CHANGE UP (ref 1.6–2.6)
MCHC RBC-ENTMCNC: 30.7 PG — SIGNIFICANT CHANGE UP (ref 27–34)
MCHC RBC-ENTMCNC: 33.3 G/DL — SIGNIFICANT CHANGE UP (ref 32–36)
MCV RBC AUTO: 92.2 FL — SIGNIFICANT CHANGE UP (ref 80–100)
MONOCYTES # BLD AUTO: 0.53 K/UL — SIGNIFICANT CHANGE UP (ref 0–0.9)
MONOCYTES NFR BLD AUTO: 12.2 % — SIGNIFICANT CHANGE UP (ref 2–14)
NEUTROPHILS # BLD AUTO: 2.52 K/UL — SIGNIFICANT CHANGE UP (ref 1.8–7.4)
NEUTROPHILS NFR BLD AUTO: 58.1 % — SIGNIFICANT CHANGE UP (ref 43–77)
NRBC # BLD: 0 /100 WBCS — SIGNIFICANT CHANGE UP (ref 0–0)
PHOSPHATE SERPL-MCNC: 3.1 MG/DL — SIGNIFICANT CHANGE UP (ref 2.5–4.5)
PLATELET # BLD AUTO: 169 K/UL — SIGNIFICANT CHANGE UP (ref 150–400)
POTASSIUM SERPL-MCNC: 4.2 MMOL/L — SIGNIFICANT CHANGE UP (ref 3.5–5.3)
POTASSIUM SERPL-SCNC: 4.2 MMOL/L — SIGNIFICANT CHANGE UP (ref 3.5–5.3)
PROT SERPL-MCNC: 6.9 G/DL — SIGNIFICANT CHANGE UP (ref 6–8.3)
RBC # BLD: 3.58 M/UL — LOW (ref 4.2–5.8)
RBC # FLD: 14.1 % — SIGNIFICANT CHANGE UP (ref 10.3–14.5)
SODIUM SERPL-SCNC: 143 MMOL/L — SIGNIFICANT CHANGE UP (ref 135–145)
URATE SERPL-MCNC: 6.7 MG/DL — SIGNIFICANT CHANGE UP (ref 3.4–8.8)
WBC # BLD: 4.34 K/UL — SIGNIFICANT CHANGE UP (ref 3.8–10.5)
WBC # FLD AUTO: 4.34 K/UL — SIGNIFICANT CHANGE UP (ref 3.8–10.5)

## 2023-10-07 ENCOUNTER — LABORATORY RESULT (OUTPATIENT)
Age: 73
End: 2023-10-07

## 2023-10-07 ENCOUNTER — RESULT REVIEW (OUTPATIENT)
Age: 73
End: 2023-10-07

## 2023-10-07 ENCOUNTER — APPOINTMENT (OUTPATIENT)
Dept: INFUSION THERAPY | Facility: HOSPITAL | Age: 73
End: 2023-10-07

## 2023-10-07 LAB
BASOPHILS # BLD AUTO: 0.08 K/UL — SIGNIFICANT CHANGE UP (ref 0–0.2)
BASOPHILS NFR BLD AUTO: 1.3 % — SIGNIFICANT CHANGE UP (ref 0–2)
EOSINOPHIL # BLD AUTO: 0.62 K/UL — HIGH (ref 0–0.5)
EOSINOPHIL NFR BLD AUTO: 10.4 % — HIGH (ref 0–6)
HCT VFR BLD CALC: 32.1 % — LOW (ref 39–50)
HGB BLD-MCNC: 10.9 G/DL — LOW (ref 13–17)
IMM GRANULOCYTES NFR BLD AUTO: 0.5 % — SIGNIFICANT CHANGE UP (ref 0–0.9)
LYMPHOCYTES # BLD AUTO: 0.6 K/UL — LOW (ref 1–3.3)
LYMPHOCYTES # BLD AUTO: 10 % — LOW (ref 13–44)
MCHC RBC-ENTMCNC: 31.8 PG — SIGNIFICANT CHANGE UP (ref 27–34)
MCHC RBC-ENTMCNC: 34 G/DL — SIGNIFICANT CHANGE UP (ref 32–36)
MCV RBC AUTO: 93.6 FL — SIGNIFICANT CHANGE UP (ref 80–100)
MONOCYTES # BLD AUTO: 0.71 K/UL — SIGNIFICANT CHANGE UP (ref 0–0.9)
MONOCYTES NFR BLD AUTO: 11.9 % — SIGNIFICANT CHANGE UP (ref 2–14)
NEUTROPHILS # BLD AUTO: 3.94 K/UL — SIGNIFICANT CHANGE UP (ref 1.8–7.4)
NEUTROPHILS NFR BLD AUTO: 65.9 % — SIGNIFICANT CHANGE UP (ref 43–77)
NRBC # BLD: 0 /100 WBCS — SIGNIFICANT CHANGE UP (ref 0–0)
PLATELET # BLD AUTO: 101 K/UL — LOW (ref 150–400)
RBC # BLD: 3.43 M/UL — LOW (ref 4.2–5.8)
RBC # FLD: 14.6 % — HIGH (ref 10.3–14.5)
WBC # BLD: 5.98 K/UL — SIGNIFICANT CHANGE UP (ref 3.8–10.5)
WBC # FLD AUTO: 5.98 K/UL — SIGNIFICANT CHANGE UP (ref 3.8–10.5)

## 2023-10-18 ENCOUNTER — APPOINTMENT (OUTPATIENT)
Dept: HEMATOLOGY ONCOLOGY | Facility: CLINIC | Age: 73
End: 2023-10-18
Payer: MEDICARE

## 2023-10-18 VITALS
DIASTOLIC BLOOD PRESSURE: 77 MMHG | BODY MASS INDEX: 22.38 KG/M2 | RESPIRATION RATE: 19 BRPM | WEIGHT: 160.5 LBS | OXYGEN SATURATION: 99 % | TEMPERATURE: 97.2 F | SYSTOLIC BLOOD PRESSURE: 193 MMHG | HEART RATE: 61 BPM

## 2023-10-18 VITALS — DIASTOLIC BLOOD PRESSURE: 77 MMHG | SYSTOLIC BLOOD PRESSURE: 166 MMHG

## 2023-10-18 PROCEDURE — 99214 OFFICE O/P EST MOD 30 MIN: CPT

## 2023-10-21 ENCOUNTER — RESULT REVIEW (OUTPATIENT)
Age: 73
End: 2023-10-21

## 2023-10-21 ENCOUNTER — APPOINTMENT (OUTPATIENT)
Dept: INFUSION THERAPY | Facility: HOSPITAL | Age: 73
End: 2023-10-21

## 2023-10-21 LAB
BASOPHILS # BLD AUTO: 0.11 K/UL — SIGNIFICANT CHANGE UP (ref 0–0.2)
BASOPHILS # BLD AUTO: 0.11 K/UL — SIGNIFICANT CHANGE UP (ref 0–0.2)
BASOPHILS NFR BLD AUTO: 3 % — HIGH (ref 0–2)
BASOPHILS NFR BLD AUTO: 3 % — HIGH (ref 0–2)
ELLIPTOCYTES BLD QL SMEAR: SLIGHT — SIGNIFICANT CHANGE UP
ELLIPTOCYTES BLD QL SMEAR: SLIGHT — SIGNIFICANT CHANGE UP
EOSINOPHIL # BLD AUTO: 0.29 K/UL — SIGNIFICANT CHANGE UP (ref 0–0.5)
EOSINOPHIL # BLD AUTO: 0.29 K/UL — SIGNIFICANT CHANGE UP (ref 0–0.5)
EOSINOPHIL NFR BLD AUTO: 8 % — HIGH (ref 0–6)
EOSINOPHIL NFR BLD AUTO: 8 % — HIGH (ref 0–6)
HCT VFR BLD CALC: 34.2 % — LOW (ref 39–50)
HCT VFR BLD CALC: 34.2 % — LOW (ref 39–50)
HGB BLD-MCNC: 11.6 G/DL — LOW (ref 13–17)
HGB BLD-MCNC: 11.6 G/DL — LOW (ref 13–17)
LYMPHOCYTES # BLD AUTO: 1.15 K/UL — SIGNIFICANT CHANGE UP (ref 1–3.3)
LYMPHOCYTES # BLD AUTO: 1.15 K/UL — SIGNIFICANT CHANGE UP (ref 1–3.3)
LYMPHOCYTES # BLD AUTO: 32 % — SIGNIFICANT CHANGE UP (ref 13–44)
LYMPHOCYTES # BLD AUTO: 32 % — SIGNIFICANT CHANGE UP (ref 13–44)
MCHC RBC-ENTMCNC: 31.5 PG — SIGNIFICANT CHANGE UP (ref 27–34)
MCHC RBC-ENTMCNC: 31.5 PG — SIGNIFICANT CHANGE UP (ref 27–34)
MCHC RBC-ENTMCNC: 33.9 G/DL — SIGNIFICANT CHANGE UP (ref 32–36)
MCHC RBC-ENTMCNC: 33.9 G/DL — SIGNIFICANT CHANGE UP (ref 32–36)
MCV RBC AUTO: 92.9 FL — SIGNIFICANT CHANGE UP (ref 80–100)
MCV RBC AUTO: 92.9 FL — SIGNIFICANT CHANGE UP (ref 80–100)
MONOCYTES # BLD AUTO: 0.9 K/UL — SIGNIFICANT CHANGE UP (ref 0–0.9)
MONOCYTES # BLD AUTO: 0.9 K/UL — SIGNIFICANT CHANGE UP (ref 0–0.9)
MONOCYTES NFR BLD AUTO: 25 % — HIGH (ref 2–14)
MONOCYTES NFR BLD AUTO: 25 % — HIGH (ref 2–14)
NEUTROPHILS # BLD AUTO: 1.15 K/UL — LOW (ref 1.8–7.4)
NEUTROPHILS # BLD AUTO: 1.15 K/UL — LOW (ref 1.8–7.4)
NEUTROPHILS NFR BLD AUTO: 32 % — LOW (ref 43–77)
NEUTROPHILS NFR BLD AUTO: 32 % — LOW (ref 43–77)
NRBC # BLD: 0 /100 — SIGNIFICANT CHANGE UP (ref 0–0)
NRBC # BLD: 0 /100 — SIGNIFICANT CHANGE UP (ref 0–0)
NRBC # BLD: SIGNIFICANT CHANGE UP /100 WBCS (ref 0–0)
NRBC # BLD: SIGNIFICANT CHANGE UP /100 WBCS (ref 0–0)
PLAT MORPH BLD: NORMAL — SIGNIFICANT CHANGE UP
PLAT MORPH BLD: NORMAL — SIGNIFICANT CHANGE UP
PLATELET # BLD AUTO: 131 K/UL — LOW (ref 150–400)
PLATELET # BLD AUTO: 131 K/UL — LOW (ref 150–400)
POIKILOCYTOSIS BLD QL AUTO: SLIGHT — SIGNIFICANT CHANGE UP
POIKILOCYTOSIS BLD QL AUTO: SLIGHT — SIGNIFICANT CHANGE UP
RBC # BLD: 3.68 M/UL — LOW (ref 4.2–5.8)
RBC # BLD: 3.68 M/UL — LOW (ref 4.2–5.8)
RBC # FLD: 14.6 % — HIGH (ref 10.3–14.5)
RBC # FLD: 14.6 % — HIGH (ref 10.3–14.5)
RBC BLD AUTO: ABNORMAL
RBC BLD AUTO: ABNORMAL
WBC # BLD: 3.59 K/UL — LOW (ref 3.8–10.5)
WBC # BLD: 3.59 K/UL — LOW (ref 3.8–10.5)
WBC # FLD AUTO: 3.59 K/UL — LOW (ref 3.8–10.5)
WBC # FLD AUTO: 3.59 K/UL — LOW (ref 3.8–10.5)

## 2023-10-22 LAB
ALBUMIN SERPL ELPH-MCNC: 4.1 G/DL — SIGNIFICANT CHANGE UP (ref 3.3–5)
ALP SERPL-CCNC: 110 U/L — SIGNIFICANT CHANGE UP (ref 40–120)
ALP SERPL-CCNC: 110 U/L — SIGNIFICANT CHANGE UP (ref 40–120)
ALP SERPL-CCNC: 111 U/L — SIGNIFICANT CHANGE UP (ref 40–120)
ALP SERPL-CCNC: 111 U/L — SIGNIFICANT CHANGE UP (ref 40–120)
ALT FLD-CCNC: 13 U/L — SIGNIFICANT CHANGE UP (ref 10–45)
ALT FLD-CCNC: 13 U/L — SIGNIFICANT CHANGE UP (ref 10–45)
ALT FLD-CCNC: 14 U/L — SIGNIFICANT CHANGE UP (ref 10–45)
ALT FLD-CCNC: 14 U/L — SIGNIFICANT CHANGE UP (ref 10–45)
ANION GAP SERPL CALC-SCNC: 11 MMOL/L — SIGNIFICANT CHANGE UP (ref 5–17)
ANION GAP SERPL CALC-SCNC: 11 MMOL/L — SIGNIFICANT CHANGE UP (ref 5–17)
ANION GAP SERPL CALC-SCNC: 14 MMOL/L — SIGNIFICANT CHANGE UP (ref 5–17)
ANION GAP SERPL CALC-SCNC: 14 MMOL/L — SIGNIFICANT CHANGE UP (ref 5–17)
AST SERPL-CCNC: 21 U/L — SIGNIFICANT CHANGE UP (ref 10–40)
AST SERPL-CCNC: 21 U/L — SIGNIFICANT CHANGE UP (ref 10–40)
AST SERPL-CCNC: 23 U/L — SIGNIFICANT CHANGE UP (ref 10–40)
AST SERPL-CCNC: 23 U/L — SIGNIFICANT CHANGE UP (ref 10–40)
BILIRUB SERPL-MCNC: 0.4 MG/DL — SIGNIFICANT CHANGE UP (ref 0.2–1.2)
BILIRUB SERPL-MCNC: 0.4 MG/DL — SIGNIFICANT CHANGE UP (ref 0.2–1.2)
BILIRUB SERPL-MCNC: 0.5 MG/DL — SIGNIFICANT CHANGE UP (ref 0.2–1.2)
BILIRUB SERPL-MCNC: 0.5 MG/DL — SIGNIFICANT CHANGE UP (ref 0.2–1.2)
BUN SERPL-MCNC: 26 MG/DL — HIGH (ref 7–23)
CALCIUM SERPL-MCNC: 8.8 MG/DL — SIGNIFICANT CHANGE UP (ref 8.4–10.5)
CALCIUM SERPL-MCNC: 8.8 MG/DL — SIGNIFICANT CHANGE UP (ref 8.4–10.5)
CALCIUM SERPL-MCNC: 8.9 MG/DL — SIGNIFICANT CHANGE UP (ref 8.4–10.5)
CALCIUM SERPL-MCNC: 8.9 MG/DL — SIGNIFICANT CHANGE UP (ref 8.4–10.5)
CHLORIDE SERPL-SCNC: 106 MMOL/L — SIGNIFICANT CHANGE UP (ref 96–108)
CHLORIDE SERPL-SCNC: 106 MMOL/L — SIGNIFICANT CHANGE UP (ref 96–108)
CHLORIDE SERPL-SCNC: 107 MMOL/L — SIGNIFICANT CHANGE UP (ref 96–108)
CHLORIDE SERPL-SCNC: 107 MMOL/L — SIGNIFICANT CHANGE UP (ref 96–108)
CO2 SERPL-SCNC: 24 MMOL/L — SIGNIFICANT CHANGE UP (ref 22–31)
CO2 SERPL-SCNC: 24 MMOL/L — SIGNIFICANT CHANGE UP (ref 22–31)
CO2 SERPL-SCNC: 25 MMOL/L — SIGNIFICANT CHANGE UP (ref 22–31)
CO2 SERPL-SCNC: 25 MMOL/L — SIGNIFICANT CHANGE UP (ref 22–31)
CREAT SERPL-MCNC: 1.18 MG/DL — SIGNIFICANT CHANGE UP (ref 0.5–1.3)
CREAT SERPL-MCNC: 1.18 MG/DL — SIGNIFICANT CHANGE UP (ref 0.5–1.3)
CREAT SERPL-MCNC: 1.21 MG/DL — SIGNIFICANT CHANGE UP (ref 0.5–1.3)
CREAT SERPL-MCNC: 1.21 MG/DL — SIGNIFICANT CHANGE UP (ref 0.5–1.3)
EGFR: 64 ML/MIN/1.73M2 — SIGNIFICANT CHANGE UP
EGFR: 64 ML/MIN/1.73M2 — SIGNIFICANT CHANGE UP
EGFR: 66 ML/MIN/1.73M2 — SIGNIFICANT CHANGE UP
EGFR: 66 ML/MIN/1.73M2 — SIGNIFICANT CHANGE UP
GLUCOSE SERPL-MCNC: 79 MG/DL — SIGNIFICANT CHANGE UP (ref 70–99)
GLUCOSE SERPL-MCNC: 79 MG/DL — SIGNIFICANT CHANGE UP (ref 70–99)
GLUCOSE SERPL-MCNC: 83 MG/DL — SIGNIFICANT CHANGE UP (ref 70–99)
GLUCOSE SERPL-MCNC: 83 MG/DL — SIGNIFICANT CHANGE UP (ref 70–99)
LDH SERPL L TO P-CCNC: 330 U/L — HIGH (ref 50–242)
LDH SERPL L TO P-CCNC: 330 U/L — HIGH (ref 50–242)
LDH SERPL L TO P-CCNC: 377 U/L — HIGH (ref 50–242)
LDH SERPL L TO P-CCNC: 377 U/L — HIGH (ref 50–242)
MAGNESIUM SERPL-MCNC: 1.6 MG/DL — SIGNIFICANT CHANGE UP (ref 1.6–2.6)
PHOSPHATE SERPL-MCNC: 2.8 MG/DL — SIGNIFICANT CHANGE UP (ref 2.5–4.5)
PHOSPHATE SERPL-MCNC: 2.8 MG/DL — SIGNIFICANT CHANGE UP (ref 2.5–4.5)
PHOSPHATE SERPL-MCNC: 2.9 MG/DL — SIGNIFICANT CHANGE UP (ref 2.5–4.5)
PHOSPHATE SERPL-MCNC: 2.9 MG/DL — SIGNIFICANT CHANGE UP (ref 2.5–4.5)
POTASSIUM SERPL-MCNC: 4.1 MMOL/L — SIGNIFICANT CHANGE UP (ref 3.5–5.3)
POTASSIUM SERPL-MCNC: 4.1 MMOL/L — SIGNIFICANT CHANGE UP (ref 3.5–5.3)
POTASSIUM SERPL-MCNC: 4.2 MMOL/L — SIGNIFICANT CHANGE UP (ref 3.5–5.3)
POTASSIUM SERPL-MCNC: 4.2 MMOL/L — SIGNIFICANT CHANGE UP (ref 3.5–5.3)
POTASSIUM SERPL-SCNC: 4.1 MMOL/L — SIGNIFICANT CHANGE UP (ref 3.5–5.3)
POTASSIUM SERPL-SCNC: 4.1 MMOL/L — SIGNIFICANT CHANGE UP (ref 3.5–5.3)
POTASSIUM SERPL-SCNC: 4.2 MMOL/L — SIGNIFICANT CHANGE UP (ref 3.5–5.3)
POTASSIUM SERPL-SCNC: 4.2 MMOL/L — SIGNIFICANT CHANGE UP (ref 3.5–5.3)
PROT SERPL-MCNC: 6.9 G/DL — SIGNIFICANT CHANGE UP (ref 6–8.3)
SODIUM SERPL-SCNC: 143 MMOL/L — SIGNIFICANT CHANGE UP (ref 135–145)
URATE SERPL-MCNC: 7.3 MG/DL — SIGNIFICANT CHANGE UP (ref 3.4–8.8)
URATE SERPL-MCNC: 7.3 MG/DL — SIGNIFICANT CHANGE UP (ref 3.4–8.8)
URATE SERPL-MCNC: 7.5 MG/DL — SIGNIFICANT CHANGE UP (ref 3.4–8.8)
URATE SERPL-MCNC: 7.5 MG/DL — SIGNIFICANT CHANGE UP (ref 3.4–8.8)

## 2023-10-31 ENCOUNTER — OUTPATIENT (OUTPATIENT)
Dept: OUTPATIENT SERVICES | Facility: HOSPITAL | Age: 73
LOS: 1 days | Discharge: HOME | End: 2023-10-31

## 2023-10-31 DIAGNOSIS — Z95.0 PRESENCE OF CARDIAC PACEMAKER: Chronic | ICD-10-CM

## 2023-10-31 DIAGNOSIS — C82.20 FOLLICULAR LYMPHOMA GRADE III, UNSPECIFIED, UNSPECIFIED SITE: ICD-10-CM

## 2023-10-31 DIAGNOSIS — C85.90 NON-HODGKIN LYMPHOMA, UNSPECIFIED, UNSPECIFIED SITE: Chronic | ICD-10-CM

## 2023-10-31 NOTE — PATIENT PROFILE ADULT - SPECIFY REASON UNABLE TO ASSESS:
Sleep MEDICINE FOLLOW-UP NOTE    Impression and Recommendations:    LEIF (obstructive sleep apnea)  Kelli comes in today in follow-up to her obstructive sleep apnea. Currently she is on AutoCPAP at 13-18 cm H2O. She feels as though she is doing fairly well. She wears her CPAP nightly and sleeps well through the night. She is awake and alert during the day. In general, she is pleased with how she is doing.  She tells me that over last 2-3 months there have been problems at work which keeps her working longer hours.  For this reason she is not sleeping long.  His problem however has resolved recently and she tells me she should be sleeping longer.  A CPAP download today verifies fair compliance and corrected apnea.  She is however only averaging 5 hours and 16 minutes nightly.  It should be noted that this is exactly the same as her last download a year ago.  She was encouraged to get more sleep.  This was reviewed by me and discussed with the patient. Care of her CPAP equipment and supplies was discussed. She does get new supplies on a regular basis. A prescription for new supplies will be sent today. Since she is doing well, we will continue to follow along on an annual plus PRN basis. The patient was encouraged to call if she has any sleep medicine questions or concerns. She seems to understand well and will call if necessary.     Obesity, morbid (CMS/HCC)  Today the patient's weight is up 2.5 kgs since her last appointment 1 year ago. She is up 6.7 kgs since the time of her sleep study. The association between obesity and obstructive sleep apnea was re-discussed. Diet, exercise and weight loss were encouraged.       Other medical problems as previously outlined - care as per Anthony Momin MD, her primary care provider.    _______________________________________________________________________________________________  _______________________________________________________________________________________________      History of Present Illness:  Ms. Kelli Roberto is a 71 year old female who comes in today in follow-up to LEIF and morbid obesity.  Please see discussions above. The patient was last seen here 1 year ago and tells me she has had no new medical issues.  The patient continues to follow along with Anthony Momin MD for her general medical care.    Patient Active Problem List    Diagnosis Date Noted   • LEIF (obstructive sleep apnea) 01/11/2016     Priority: High     NPSG performed at [facility] on 10/22/15.  Wt = 215 lbs. / 98 kg  AHI = 52.9 events/hr.  Max. Desat = 87 %.  APAP @ 10-15 cm H2O recommended.     • Obesity, morbid (CMD) 01/11/2016     Priority: Medium   • Foreign body of right cornea 02/10/2022     Priority: Low   • History of breast cancer in female 06/02/2021     Priority: Low   • Achilles tendinitis of right lower extremity 01/20/2020     Priority: Low   • Elevated blood pressure reading without diagnosis of hypertension 05/28/2019     Priority: Low   • Pseudophakia of both eyes 05/02/2018     Priority: Low   • Age-related nuclear cataract of left eye 03/23/2018     Priority: Low   • Paresthesias in right hand 09/27/2017     Priority: Low   • Low-tension glaucoma of both eyes, severe stage 01/27/2017     Priority: Low   • ARMD (age-related macular degeneration), bilateral 09/22/2016     Priority: Low   • Status post total abdominal hysterectomy and bilateral salpingo-oophorectomy (JAMES-BSO) 01/11/2016     Priority: Low   • Status post right breast lumpectomy 01/11/2016     Priority: Low     Breast cancer     • Status post nasal septoplasty 01/11/2016     Priority: Low   • Status post bilateral laser eye surgery 01/11/2016     Priority: Low   • OA (osteoarthritis) 01/11/2016     Priority: Low   • Thyroid  nodule 01/11/2016     Priority: Low   • Lymphedema, right arm 11/05/2015     Priority: Low   • Glaucoma, normal tension 03/24/2015     Priority: Low   • Seborrheic dermatitis 02/14/2014     Priority: Low   • Status post bilateral total hip arthroplasty 12/24/2013     Priority: Low   • Dyslipidemia      Priority: Low   • IFG (impaired fasting glucose)      Priority: Low   • Major depressive disorder with single episode, in full remission (CMS/[facility])      Priority: Low   • Asthma      Priority: Low   • GERD (gastroesophageal reflux disease)      Priority: Low   • Colon polyps      Priority: Low     Colonoscopy 08/27/2018. Ascending colon polyp removed. Follow-up in 5 years.  Colonoscopy 07/20/2012. Tubular adenoma removed from ascending colon.         Medications, Allergies, Past Medical History, Past Surgical History, Social History, and Family History:  I have reviewed the patient's medications and allergies, past medical, surgical, social and family history, updating these as appropriate.  See Histories section of the medical record for a display of this information.     Complete Review of Systems:  All other systems were reviewed and are negative, except for the pertinent positives and negatives as documented in the History of Present Illness.    Physical Exam:    Constitutional: Reveals a pleasant, elderly female who is in no acute distress.   Vital Signs:  Blood pressure 128/68, pulse 64, temperature 97.8 °F (36.6 °C), temperature source Tympanic, resp. rate 14, height 5' 1\" (1.549 m), weight 104.7 kg (230 lb 13.2 oz), SpO2 99 %., Body mass index is 43.61 kg/m².  HEENT: Head is normocephalic and atraumatic. Pupils are round and reactive to light.  Conjunctiva are injected.  Nose is patent.  Throat is crowded but there is no erythema, exudate or postnasal drip.  Neck: Neck is  obese,  supple and symmetric. There is no JVD, thyromegaly, or lymphadenopathy.  Thorax/Back: Thorax is obese but otherwise of normal size  and shape. There is no point tenderness or bony deformity.   Lungs: Lungs are somewhat distant but clear to auscultation and percussion.  There is no wheeze, rhonchi or crackles present.  Air movement is fairly good.  Cardiac: Heart is distant and regular with normal S1 and S2. There is no S3 or S4. There is also no appreciable murmur or rub.   Abdomen: Abdomen is  obese and  soft with normoactive bowel sounds. No mass, tenderness, or hepatosplenomegaly.  Musculoskeletal: Extremities show no clubbing or cyanosis.  There is no pedal edema.   Neurologic: Patient is alert and oriented. Neurologic exam is grossly intact and gait is normal.   Dermatologic: No acute rash.    Ancillary Studies:    The patient CPAP download is as outlined above.  This was reviewed by me and discussed with the patient.   Pt   can't recall

## 2023-11-04 ENCOUNTER — RESULT REVIEW (OUTPATIENT)
Age: 73
End: 2023-11-04

## 2023-11-04 ENCOUNTER — APPOINTMENT (OUTPATIENT)
Dept: INFUSION THERAPY | Facility: HOSPITAL | Age: 73
End: 2023-11-04

## 2023-11-04 LAB
BASOPHILS # BLD AUTO: 0.12 K/UL — SIGNIFICANT CHANGE UP (ref 0–0.2)
BASOPHILS # BLD AUTO: 0.12 K/UL — SIGNIFICANT CHANGE UP (ref 0–0.2)
BASOPHILS NFR BLD AUTO: 2 % — SIGNIFICANT CHANGE UP (ref 0–2)
BASOPHILS NFR BLD AUTO: 2 % — SIGNIFICANT CHANGE UP (ref 0–2)
EOSINOPHIL # BLD AUTO: 0.43 K/UL — SIGNIFICANT CHANGE UP (ref 0–0.5)
EOSINOPHIL # BLD AUTO: 0.43 K/UL — SIGNIFICANT CHANGE UP (ref 0–0.5)
EOSINOPHIL NFR BLD AUTO: 7.2 % — HIGH (ref 0–6)
EOSINOPHIL NFR BLD AUTO: 7.2 % — HIGH (ref 0–6)
HCT VFR BLD CALC: 37.1 % — LOW (ref 39–50)
HCT VFR BLD CALC: 37.1 % — LOW (ref 39–50)
HGB BLD-MCNC: 12.2 G/DL — LOW (ref 13–17)
HGB BLD-MCNC: 12.2 G/DL — LOW (ref 13–17)
IMM GRANULOCYTES NFR BLD AUTO: 1.3 % — HIGH (ref 0–0.9)
IMM GRANULOCYTES NFR BLD AUTO: 1.3 % — HIGH (ref 0–0.9)
LYMPHOCYTES # BLD AUTO: 0.87 K/UL — LOW (ref 1–3.3)
LYMPHOCYTES # BLD AUTO: 0.87 K/UL — LOW (ref 1–3.3)
LYMPHOCYTES # BLD AUTO: 14.6 % — SIGNIFICANT CHANGE UP (ref 13–44)
LYMPHOCYTES # BLD AUTO: 14.6 % — SIGNIFICANT CHANGE UP (ref 13–44)
MCHC RBC-ENTMCNC: 30.4 PG — SIGNIFICANT CHANGE UP (ref 27–34)
MCHC RBC-ENTMCNC: 30.4 PG — SIGNIFICANT CHANGE UP (ref 27–34)
MCHC RBC-ENTMCNC: 32.9 G/DL — SIGNIFICANT CHANGE UP (ref 32–36)
MCHC RBC-ENTMCNC: 32.9 G/DL — SIGNIFICANT CHANGE UP (ref 32–36)
MCV RBC AUTO: 92.5 FL — SIGNIFICANT CHANGE UP (ref 80–100)
MCV RBC AUTO: 92.5 FL — SIGNIFICANT CHANGE UP (ref 80–100)
MONOCYTES # BLD AUTO: 0.45 K/UL — SIGNIFICANT CHANGE UP (ref 0–0.9)
MONOCYTES # BLD AUTO: 0.45 K/UL — SIGNIFICANT CHANGE UP (ref 0–0.9)
MONOCYTES NFR BLD AUTO: 7.5 % — SIGNIFICANT CHANGE UP (ref 2–14)
MONOCYTES NFR BLD AUTO: 7.5 % — SIGNIFICANT CHANGE UP (ref 2–14)
NEUTROPHILS # BLD AUTO: 4.02 K/UL — SIGNIFICANT CHANGE UP (ref 1.8–7.4)
NEUTROPHILS # BLD AUTO: 4.02 K/UL — SIGNIFICANT CHANGE UP (ref 1.8–7.4)
NEUTROPHILS NFR BLD AUTO: 67.4 % — SIGNIFICANT CHANGE UP (ref 43–77)
NEUTROPHILS NFR BLD AUTO: 67.4 % — SIGNIFICANT CHANGE UP (ref 43–77)
NRBC # BLD: 0 /100 WBCS — SIGNIFICANT CHANGE UP (ref 0–0)
NRBC # BLD: 0 /100 WBCS — SIGNIFICANT CHANGE UP (ref 0–0)
PLATELET # BLD AUTO: 131 K/UL — LOW (ref 150–400)
PLATELET # BLD AUTO: 131 K/UL — LOW (ref 150–400)
RBC # BLD: 4.01 M/UL — LOW (ref 4.2–5.8)
RBC # BLD: 4.01 M/UL — LOW (ref 4.2–5.8)
RBC # FLD: 13.5 % — SIGNIFICANT CHANGE UP (ref 10.3–14.5)
RBC # FLD: 13.5 % — SIGNIFICANT CHANGE UP (ref 10.3–14.5)
WBC # BLD: 5.97 K/UL — SIGNIFICANT CHANGE UP (ref 3.8–10.5)
WBC # BLD: 5.97 K/UL — SIGNIFICANT CHANGE UP (ref 3.8–10.5)
WBC # FLD AUTO: 5.97 K/UL — SIGNIFICANT CHANGE UP (ref 3.8–10.5)
WBC # FLD AUTO: 5.97 K/UL — SIGNIFICANT CHANGE UP (ref 3.8–10.5)

## 2023-11-06 DIAGNOSIS — R11.2 NAUSEA WITH VOMITING, UNSPECIFIED: ICD-10-CM

## 2023-11-06 DIAGNOSIS — Z51.11 ENCOUNTER FOR ANTINEOPLASTIC CHEMOTHERAPY: ICD-10-CM

## 2023-11-14 ENCOUNTER — APPOINTMENT (OUTPATIENT)
Dept: GERIATRICS | Facility: CLINIC | Age: 73
End: 2023-11-14
Payer: MEDICARE

## 2023-11-14 VITALS
TEMPERATURE: 97.8 F | DIASTOLIC BLOOD PRESSURE: 71 MMHG | BODY MASS INDEX: 21.9 KG/M2 | HEART RATE: 68 BPM | SYSTOLIC BLOOD PRESSURE: 156 MMHG | WEIGHT: 157 LBS | OXYGEN SATURATION: 99 % | RESPIRATION RATE: 15 BRPM

## 2023-11-14 DIAGNOSIS — R41.89 OTHER SYMPTOMS AND SIGNS INVOLVING COGNITIVE FUNCTIONS AND AWARENESS: ICD-10-CM

## 2023-11-14 DIAGNOSIS — E79.0 HYPERURICEMIA W/OUT SIGNS OF INFLAMMATORY ARTHRITIS AND TOPHACEOUS DISEASE: ICD-10-CM

## 2023-11-14 DIAGNOSIS — Z23 ENCOUNTER FOR IMMUNIZATION: ICD-10-CM

## 2023-11-14 PROCEDURE — 90662 IIV NO PRSV INCREASED AG IM: CPT

## 2023-11-14 PROCEDURE — 99214 OFFICE O/P EST MOD 30 MIN: CPT | Mod: 25

## 2023-11-14 PROCEDURE — G0008: CPT

## 2023-11-16 PROBLEM — R41.89 COGNITIVE IMPAIRMENT: Status: ACTIVE | Noted: 2023-06-13

## 2023-11-16 PROBLEM — E79.0 HYPERURICEMIA: Status: ACTIVE | Noted: 2023-11-16

## 2023-11-16 PROBLEM — Z23 ENCOUNTER FOR IMMUNIZATION: Status: ACTIVE | Noted: 2023-11-14 | Resolved: 2023-11-28

## 2023-11-18 ENCOUNTER — RESULT REVIEW (OUTPATIENT)
Age: 73
End: 2023-11-18

## 2023-11-18 ENCOUNTER — NON-APPOINTMENT (OUTPATIENT)
Age: 73
End: 2023-11-18

## 2023-11-18 ENCOUNTER — APPOINTMENT (OUTPATIENT)
Dept: INFUSION THERAPY | Facility: HOSPITAL | Age: 73
End: 2023-11-18

## 2023-11-18 ENCOUNTER — APPOINTMENT (OUTPATIENT)
Dept: HEMATOLOGY ONCOLOGY | Facility: CLINIC | Age: 73
End: 2023-11-18

## 2023-11-18 LAB
BASOPHILS # BLD AUTO: 0 K/UL — SIGNIFICANT CHANGE UP (ref 0–0.2)
BASOPHILS # BLD AUTO: 0 K/UL — SIGNIFICANT CHANGE UP (ref 0–0.2)
BASOPHILS NFR BLD AUTO: 0 % — SIGNIFICANT CHANGE UP (ref 0–2)
BASOPHILS NFR BLD AUTO: 0 % — SIGNIFICANT CHANGE UP (ref 0–2)
ELLIPTOCYTES BLD QL SMEAR: SLIGHT — SIGNIFICANT CHANGE UP
ELLIPTOCYTES BLD QL SMEAR: SLIGHT — SIGNIFICANT CHANGE UP
EOSINOPHIL # BLD AUTO: 0.47 K/UL — SIGNIFICANT CHANGE UP (ref 0–0.5)
EOSINOPHIL # BLD AUTO: 0.47 K/UL — SIGNIFICANT CHANGE UP (ref 0–0.5)
EOSINOPHIL NFR BLD AUTO: 20 % — HIGH (ref 0–6)
EOSINOPHIL NFR BLD AUTO: 20 % — HIGH (ref 0–6)
HCT VFR BLD CALC: 32.2 % — LOW (ref 39–50)
HCT VFR BLD CALC: 32.2 % — LOW (ref 39–50)
HGB BLD-MCNC: 10.8 G/DL — LOW (ref 13–17)
HGB BLD-MCNC: 10.8 G/DL — LOW (ref 13–17)
LYMPHOCYTES # BLD AUTO: 0.77 K/UL — LOW (ref 1–3.3)
LYMPHOCYTES # BLD AUTO: 0.77 K/UL — LOW (ref 1–3.3)
LYMPHOCYTES # BLD AUTO: 33 % — SIGNIFICANT CHANGE UP (ref 13–44)
LYMPHOCYTES # BLD AUTO: 33 % — SIGNIFICANT CHANGE UP (ref 13–44)
MCHC RBC-ENTMCNC: 30.7 PG — SIGNIFICANT CHANGE UP (ref 27–34)
MCHC RBC-ENTMCNC: 30.7 PG — SIGNIFICANT CHANGE UP (ref 27–34)
MCHC RBC-ENTMCNC: 33.5 G/DL — SIGNIFICANT CHANGE UP (ref 32–36)
MCHC RBC-ENTMCNC: 33.5 G/DL — SIGNIFICANT CHANGE UP (ref 32–36)
MCV RBC AUTO: 91.5 FL — SIGNIFICANT CHANGE UP (ref 80–100)
MCV RBC AUTO: 91.5 FL — SIGNIFICANT CHANGE UP (ref 80–100)
MONOCYTES # BLD AUTO: 0.3 K/UL — SIGNIFICANT CHANGE UP (ref 0–0.9)
MONOCYTES # BLD AUTO: 0.3 K/UL — SIGNIFICANT CHANGE UP (ref 0–0.9)
MONOCYTES NFR BLD AUTO: 13 % — SIGNIFICANT CHANGE UP (ref 2–14)
MONOCYTES NFR BLD AUTO: 13 % — SIGNIFICANT CHANGE UP (ref 2–14)
NEUTROPHILS # BLD AUTO: 0.77 K/UL — LOW (ref 1.8–7.4)
NEUTROPHILS # BLD AUTO: 0.77 K/UL — LOW (ref 1.8–7.4)
NEUTROPHILS NFR BLD AUTO: 33 % — LOW (ref 43–77)
NEUTROPHILS NFR BLD AUTO: 33 % — LOW (ref 43–77)
NRBC # BLD: 0 /100 — SIGNIFICANT CHANGE UP (ref 0–0)
NRBC # BLD: 0 /100 — SIGNIFICANT CHANGE UP (ref 0–0)
NRBC # BLD: SIGNIFICANT CHANGE UP /100 WBCS (ref 0–0)
NRBC # BLD: SIGNIFICANT CHANGE UP /100 WBCS (ref 0–0)
PLAT MORPH BLD: NORMAL — SIGNIFICANT CHANGE UP
PLAT MORPH BLD: NORMAL — SIGNIFICANT CHANGE UP
PLATELET # BLD AUTO: 88 K/UL — LOW (ref 150–400)
PLATELET # BLD AUTO: 88 K/UL — LOW (ref 150–400)
POIKILOCYTOSIS BLD QL AUTO: SLIGHT — SIGNIFICANT CHANGE UP
POIKILOCYTOSIS BLD QL AUTO: SLIGHT — SIGNIFICANT CHANGE UP
RBC # BLD: 3.52 M/UL — LOW (ref 4.2–5.8)
RBC # BLD: 3.52 M/UL — LOW (ref 4.2–5.8)
RBC # FLD: 13.6 % — SIGNIFICANT CHANGE UP (ref 10.3–14.5)
RBC # FLD: 13.6 % — SIGNIFICANT CHANGE UP (ref 10.3–14.5)
RBC BLD AUTO: ABNORMAL
RBC BLD AUTO: ABNORMAL
VARIANT LYMPHS # BLD: 1 % — SIGNIFICANT CHANGE UP (ref 0–6)
VARIANT LYMPHS # BLD: 1 % — SIGNIFICANT CHANGE UP (ref 0–6)
WBC # BLD: 2.34 K/UL — LOW (ref 3.8–10.5)
WBC # BLD: 2.34 K/UL — LOW (ref 3.8–10.5)
WBC # FLD AUTO: 2.34 K/UL — LOW (ref 3.8–10.5)
WBC # FLD AUTO: 2.34 K/UL — LOW (ref 3.8–10.5)

## 2023-11-21 ENCOUNTER — RX RENEWAL (OUTPATIENT)
Age: 73
End: 2023-11-21

## 2023-11-21 RX ORDER — METOPROLOL SUCCINATE 25 MG/1
25 TABLET, EXTENDED RELEASE ORAL DAILY
Qty: 30 | Refills: 1 | Status: ACTIVE | COMMUNITY
Start: 2022-10-10 | End: 1900-01-01

## 2023-12-02 ENCOUNTER — APPOINTMENT (OUTPATIENT)
Dept: INFUSION THERAPY | Facility: HOSPITAL | Age: 73
End: 2023-12-02

## 2023-12-02 ENCOUNTER — APPOINTMENT (OUTPATIENT)
Dept: HEMATOLOGY ONCOLOGY | Facility: CLINIC | Age: 73
End: 2023-12-02

## 2023-12-02 ENCOUNTER — RESULT REVIEW (OUTPATIENT)
Age: 73
End: 2023-12-02

## 2023-12-02 LAB
ALBUMIN SERPL ELPH-MCNC: 3.9 G/DL — SIGNIFICANT CHANGE UP (ref 3.3–5)
ALBUMIN SERPL ELPH-MCNC: 3.9 G/DL — SIGNIFICANT CHANGE UP (ref 3.3–5)
ALP SERPL-CCNC: 131 U/L — HIGH (ref 40–120)
ALP SERPL-CCNC: 131 U/L — HIGH (ref 40–120)
ALT FLD-CCNC: 17 U/L — SIGNIFICANT CHANGE UP (ref 10–45)
ALT FLD-CCNC: 17 U/L — SIGNIFICANT CHANGE UP (ref 10–45)
ANION GAP SERPL CALC-SCNC: 12 MMOL/L — SIGNIFICANT CHANGE UP (ref 5–17)
ANION GAP SERPL CALC-SCNC: 12 MMOL/L — SIGNIFICANT CHANGE UP (ref 5–17)
AST SERPL-CCNC: 25 U/L — SIGNIFICANT CHANGE UP (ref 10–40)
AST SERPL-CCNC: 25 U/L — SIGNIFICANT CHANGE UP (ref 10–40)
BASOPHILS # BLD AUTO: 0.01 K/UL — SIGNIFICANT CHANGE UP (ref 0–0.2)
BASOPHILS # BLD AUTO: 0.01 K/UL — SIGNIFICANT CHANGE UP (ref 0–0.2)
BASOPHILS NFR BLD AUTO: 1 % — SIGNIFICANT CHANGE UP (ref 0–2)
BASOPHILS NFR BLD AUTO: 1 % — SIGNIFICANT CHANGE UP (ref 0–2)
BILIRUB SERPL-MCNC: 0.4 MG/DL — SIGNIFICANT CHANGE UP (ref 0.2–1.2)
BILIRUB SERPL-MCNC: 0.4 MG/DL — SIGNIFICANT CHANGE UP (ref 0.2–1.2)
BUN SERPL-MCNC: 18 MG/DL — SIGNIFICANT CHANGE UP (ref 7–23)
BUN SERPL-MCNC: 18 MG/DL — SIGNIFICANT CHANGE UP (ref 7–23)
CALCIUM SERPL-MCNC: 9.5 MG/DL — SIGNIFICANT CHANGE UP (ref 8.4–10.5)
CALCIUM SERPL-MCNC: 9.5 MG/DL — SIGNIFICANT CHANGE UP (ref 8.4–10.5)
CHLORIDE SERPL-SCNC: 106 MMOL/L — SIGNIFICANT CHANGE UP (ref 96–108)
CHLORIDE SERPL-SCNC: 106 MMOL/L — SIGNIFICANT CHANGE UP (ref 96–108)
CO2 SERPL-SCNC: 25 MMOL/L — SIGNIFICANT CHANGE UP (ref 22–31)
CO2 SERPL-SCNC: 25 MMOL/L — SIGNIFICANT CHANGE UP (ref 22–31)
CREAT SERPL-MCNC: 1.23 MG/DL — SIGNIFICANT CHANGE UP (ref 0.5–1.3)
CREAT SERPL-MCNC: 1.23 MG/DL — SIGNIFICANT CHANGE UP (ref 0.5–1.3)
DACRYOCYTES BLD QL SMEAR: SLIGHT — SIGNIFICANT CHANGE UP
DACRYOCYTES BLD QL SMEAR: SLIGHT — SIGNIFICANT CHANGE UP
EGFR: 62 ML/MIN/1.73M2 — SIGNIFICANT CHANGE UP
EGFR: 62 ML/MIN/1.73M2 — SIGNIFICANT CHANGE UP
ELLIPTOCYTES BLD QL SMEAR: SLIGHT — SIGNIFICANT CHANGE UP
ELLIPTOCYTES BLD QL SMEAR: SLIGHT — SIGNIFICANT CHANGE UP
EOSINOPHIL # BLD AUTO: 0.12 K/UL — SIGNIFICANT CHANGE UP (ref 0–0.5)
EOSINOPHIL # BLD AUTO: 0.12 K/UL — SIGNIFICANT CHANGE UP (ref 0–0.5)
EOSINOPHIL NFR BLD AUTO: 10 % — HIGH (ref 0–6)
EOSINOPHIL NFR BLD AUTO: 10 % — HIGH (ref 0–6)
GLUCOSE SERPL-MCNC: 121 MG/DL — HIGH (ref 70–99)
GLUCOSE SERPL-MCNC: 121 MG/DL — HIGH (ref 70–99)
HCT VFR BLD CALC: 35.3 % — LOW (ref 39–50)
HCT VFR BLD CALC: 35.3 % — LOW (ref 39–50)
HGB BLD-MCNC: 12.1 G/DL — LOW (ref 13–17)
HGB BLD-MCNC: 12.1 G/DL — LOW (ref 13–17)
LDH SERPL L TO P-CCNC: 425 U/L — HIGH (ref 50–242)
LDH SERPL L TO P-CCNC: 425 U/L — HIGH (ref 50–242)
LYMPHOCYTES # BLD AUTO: 0.54 K/UL — LOW (ref 1–3.3)
LYMPHOCYTES # BLD AUTO: 0.54 K/UL — LOW (ref 1–3.3)
LYMPHOCYTES # BLD AUTO: 46 % — HIGH (ref 13–44)
LYMPHOCYTES # BLD AUTO: 46 % — HIGH (ref 13–44)
MAGNESIUM SERPL-MCNC: 1.9 MG/DL — SIGNIFICANT CHANGE UP (ref 1.6–2.6)
MAGNESIUM SERPL-MCNC: 1.9 MG/DL — SIGNIFICANT CHANGE UP (ref 1.6–2.6)
MCHC RBC-ENTMCNC: 31 PG — SIGNIFICANT CHANGE UP (ref 27–34)
MCHC RBC-ENTMCNC: 31 PG — SIGNIFICANT CHANGE UP (ref 27–34)
MCHC RBC-ENTMCNC: 34.3 G/DL — SIGNIFICANT CHANGE UP (ref 32–36)
MCHC RBC-ENTMCNC: 34.3 G/DL — SIGNIFICANT CHANGE UP (ref 32–36)
MCV RBC AUTO: 90.5 FL — SIGNIFICANT CHANGE UP (ref 80–100)
MCV RBC AUTO: 90.5 FL — SIGNIFICANT CHANGE UP (ref 80–100)
MONOCYTES # BLD AUTO: 0.34 K/UL — SIGNIFICANT CHANGE UP (ref 0–0.9)
MONOCYTES # BLD AUTO: 0.34 K/UL — SIGNIFICANT CHANGE UP (ref 0–0.9)
MONOCYTES NFR BLD AUTO: 29 % — HIGH (ref 2–14)
MONOCYTES NFR BLD AUTO: 29 % — HIGH (ref 2–14)
NEUTROPHILS # BLD AUTO: 0.14 K/UL — LOW (ref 1.8–7.4)
NEUTROPHILS # BLD AUTO: 0.14 K/UL — LOW (ref 1.8–7.4)
NEUTROPHILS NFR BLD AUTO: 12 % — LOW (ref 43–77)
NEUTROPHILS NFR BLD AUTO: 12 % — LOW (ref 43–77)
NRBC # BLD: 0 /100 — SIGNIFICANT CHANGE UP (ref 0–0)
NRBC # BLD: 0 /100 — SIGNIFICANT CHANGE UP (ref 0–0)
NRBC # BLD: SIGNIFICANT CHANGE UP /100 WBCS (ref 0–0)
NRBC # BLD: SIGNIFICANT CHANGE UP /100 WBCS (ref 0–0)
PHOSPHATE SERPL-MCNC: 2.3 MG/DL — LOW (ref 2.5–4.5)
PHOSPHATE SERPL-MCNC: 2.3 MG/DL — LOW (ref 2.5–4.5)
PLAT MORPH BLD: NORMAL — SIGNIFICANT CHANGE UP
PLAT MORPH BLD: NORMAL — SIGNIFICANT CHANGE UP
PLATELET # BLD AUTO: 106 K/UL — LOW (ref 150–400)
PLATELET # BLD AUTO: 106 K/UL — LOW (ref 150–400)
POIKILOCYTOSIS BLD QL AUTO: SLIGHT — SIGNIFICANT CHANGE UP
POIKILOCYTOSIS BLD QL AUTO: SLIGHT — SIGNIFICANT CHANGE UP
POTASSIUM SERPL-MCNC: 4 MMOL/L — SIGNIFICANT CHANGE UP (ref 3.5–5.3)
POTASSIUM SERPL-MCNC: 4 MMOL/L — SIGNIFICANT CHANGE UP (ref 3.5–5.3)
POTASSIUM SERPL-SCNC: 4 MMOL/L — SIGNIFICANT CHANGE UP (ref 3.5–5.3)
POTASSIUM SERPL-SCNC: 4 MMOL/L — SIGNIFICANT CHANGE UP (ref 3.5–5.3)
PROT SERPL-MCNC: 7.4 G/DL — SIGNIFICANT CHANGE UP (ref 6–8.3)
PROT SERPL-MCNC: 7.4 G/DL — SIGNIFICANT CHANGE UP (ref 6–8.3)
RBC # BLD: 3.9 M/UL — LOW (ref 4.2–5.8)
RBC # BLD: 3.9 M/UL — LOW (ref 4.2–5.8)
RBC # FLD: 14.2 % — SIGNIFICANT CHANGE UP (ref 10.3–14.5)
RBC # FLD: 14.2 % — SIGNIFICANT CHANGE UP (ref 10.3–14.5)
RBC BLD AUTO: ABNORMAL
RBC BLD AUTO: ABNORMAL
SODIUM SERPL-SCNC: 143 MMOL/L — SIGNIFICANT CHANGE UP (ref 135–145)
SODIUM SERPL-SCNC: 143 MMOL/L — SIGNIFICANT CHANGE UP (ref 135–145)
URATE SERPL-MCNC: 6.8 MG/DL — SIGNIFICANT CHANGE UP (ref 3.4–8.8)
URATE SERPL-MCNC: 6.8 MG/DL — SIGNIFICANT CHANGE UP (ref 3.4–8.8)
VARIANT LYMPHS # BLD: 2 % — SIGNIFICANT CHANGE UP (ref 0–6)
VARIANT LYMPHS # BLD: 2 % — SIGNIFICANT CHANGE UP (ref 0–6)
WBC # BLD: 1.18 K/UL — LOW (ref 3.8–10.5)
WBC # BLD: 1.18 K/UL — LOW (ref 3.8–10.5)
WBC # FLD AUTO: 1.18 K/UL — LOW (ref 3.8–10.5)
WBC # FLD AUTO: 1.18 K/UL — LOW (ref 3.8–10.5)

## 2023-12-04 ENCOUNTER — APPOINTMENT (OUTPATIENT)
Dept: HEMATOLOGY ONCOLOGY | Facility: CLINIC | Age: 73
End: 2023-12-04

## 2023-12-05 ENCOUNTER — NON-APPOINTMENT (OUTPATIENT)
Age: 73
End: 2023-12-05

## 2023-12-05 ENCOUNTER — APPOINTMENT (OUTPATIENT)
Dept: ELECTROPHYSIOLOGY | Facility: CLINIC | Age: 73
End: 2023-12-05
Payer: MEDICARE

## 2023-12-05 PROCEDURE — 93296 REM INTERROG EVL PM/IDS: CPT

## 2023-12-05 PROCEDURE — 93294 REM INTERROG EVL PM/LDLS PM: CPT

## 2023-12-06 ENCOUNTER — APPOINTMENT (OUTPATIENT)
Dept: HEMATOLOGY ONCOLOGY | Facility: CLINIC | Age: 73
End: 2023-12-06
Payer: MEDICARE

## 2023-12-06 ENCOUNTER — RESULT REVIEW (OUTPATIENT)
Age: 73
End: 2023-12-06

## 2023-12-06 VITALS
HEART RATE: 67 BPM | TEMPERATURE: 97.4 F | SYSTOLIC BLOOD PRESSURE: 190 MMHG | RESPIRATION RATE: 16 BRPM | WEIGHT: 158.51 LBS | BODY MASS INDEX: 22.11 KG/M2 | OXYGEN SATURATION: 99 % | DIASTOLIC BLOOD PRESSURE: 84 MMHG

## 2023-12-06 LAB
BASOPHILS # BLD AUTO: 0 K/UL — SIGNIFICANT CHANGE UP (ref 0–0.2)
BASOPHILS # BLD AUTO: 0 K/UL — SIGNIFICANT CHANGE UP (ref 0–0.2)
BASOPHILS NFR BLD AUTO: 0 % — SIGNIFICANT CHANGE UP (ref 0–2)
BASOPHILS NFR BLD AUTO: 0 % — SIGNIFICANT CHANGE UP (ref 0–2)
BLASTS # FLD: 0.5 % — HIGH (ref 0–0)
BLASTS # FLD: 0.5 % — HIGH (ref 0–0)
DACRYOCYTES BLD QL SMEAR: SLIGHT — SIGNIFICANT CHANGE UP
DACRYOCYTES BLD QL SMEAR: SLIGHT — SIGNIFICANT CHANGE UP
ELLIPTOCYTES BLD QL SMEAR: SLIGHT — SIGNIFICANT CHANGE UP
ELLIPTOCYTES BLD QL SMEAR: SLIGHT — SIGNIFICANT CHANGE UP
EOSINOPHIL # BLD AUTO: 0.22 K/UL — SIGNIFICANT CHANGE UP (ref 0–0.5)
EOSINOPHIL # BLD AUTO: 0.22 K/UL — SIGNIFICANT CHANGE UP (ref 0–0.5)
EOSINOPHIL NFR BLD AUTO: 11.5 % — HIGH (ref 0–6)
EOSINOPHIL NFR BLD AUTO: 11.5 % — HIGH (ref 0–6)
HCT VFR BLD CALC: 38 % — LOW (ref 39–50)
HCT VFR BLD CALC: 38 % — LOW (ref 39–50)
HGB BLD-MCNC: 13 G/DL — SIGNIFICANT CHANGE UP (ref 13–17)
HGB BLD-MCNC: 13 G/DL — SIGNIFICANT CHANGE UP (ref 13–17)
LYMPHOCYTES # BLD AUTO: 0.69 K/UL — LOW (ref 1–3.3)
LYMPHOCYTES # BLD AUTO: 0.69 K/UL — LOW (ref 1–3.3)
LYMPHOCYTES # BLD AUTO: 35.5 % — SIGNIFICANT CHANGE UP (ref 13–44)
LYMPHOCYTES # BLD AUTO: 35.5 % — SIGNIFICANT CHANGE UP (ref 13–44)
MCHC RBC-ENTMCNC: 30.7 PG — SIGNIFICANT CHANGE UP (ref 27–34)
MCHC RBC-ENTMCNC: 30.7 PG — SIGNIFICANT CHANGE UP (ref 27–34)
MCHC RBC-ENTMCNC: 34.2 G/DL — SIGNIFICANT CHANGE UP (ref 32–36)
MCHC RBC-ENTMCNC: 34.2 G/DL — SIGNIFICANT CHANGE UP (ref 32–36)
MCV RBC AUTO: 89.8 FL — SIGNIFICANT CHANGE UP (ref 80–100)
MCV RBC AUTO: 89.8 FL — SIGNIFICANT CHANGE UP (ref 80–100)
MONOCYTES # BLD AUTO: 0.43 K/UL — SIGNIFICANT CHANGE UP (ref 0–0.9)
MONOCYTES # BLD AUTO: 0.43 K/UL — SIGNIFICANT CHANGE UP (ref 0–0.9)
MONOCYTES NFR BLD AUTO: 22.5 % — HIGH (ref 2–14)
MONOCYTES NFR BLD AUTO: 22.5 % — HIGH (ref 2–14)
NEUTROPHILS # BLD AUTO: 0.58 K/UL — LOW (ref 1.8–7.4)
NEUTROPHILS # BLD AUTO: 0.58 K/UL — LOW (ref 1.8–7.4)
NEUTROPHILS NFR BLD AUTO: 30 % — LOW (ref 43–77)
NEUTROPHILS NFR BLD AUTO: 30 % — LOW (ref 43–77)
NRBC # BLD: 0 /100 — SIGNIFICANT CHANGE UP (ref 0–0)
NRBC # BLD: 0 /100 — SIGNIFICANT CHANGE UP (ref 0–0)
NRBC # BLD: SIGNIFICANT CHANGE UP /100 WBCS (ref 0–0)
NRBC # BLD: SIGNIFICANT CHANGE UP /100 WBCS (ref 0–0)
PLAT MORPH BLD: NORMAL — SIGNIFICANT CHANGE UP
PLAT MORPH BLD: NORMAL — SIGNIFICANT CHANGE UP
PLATELET # BLD AUTO: 116 K/UL — LOW (ref 150–400)
PLATELET # BLD AUTO: 116 K/UL — LOW (ref 150–400)
POIKILOCYTOSIS BLD QL AUTO: SLIGHT — SIGNIFICANT CHANGE UP
POIKILOCYTOSIS BLD QL AUTO: SLIGHT — SIGNIFICANT CHANGE UP
RBC # BLD: 4.23 M/UL — SIGNIFICANT CHANGE UP (ref 4.2–5.8)
RBC # BLD: 4.23 M/UL — SIGNIFICANT CHANGE UP (ref 4.2–5.8)
RBC # FLD: 14.5 % — SIGNIFICANT CHANGE UP (ref 10.3–14.5)
RBC # FLD: 14.5 % — SIGNIFICANT CHANGE UP (ref 10.3–14.5)
RBC BLD AUTO: ABNORMAL
RBC BLD AUTO: ABNORMAL
WBC # BLD: 1.93 K/UL — LOW (ref 3.8–10.5)
WBC # BLD: 1.93 K/UL — LOW (ref 3.8–10.5)
WBC # FLD AUTO: 1.93 K/UL — LOW (ref 3.8–10.5)
WBC # FLD AUTO: 1.93 K/UL — LOW (ref 3.8–10.5)

## 2023-12-06 PROCEDURE — 99215 OFFICE O/P EST HI 40 MIN: CPT

## 2023-12-14 ENCOUNTER — NON-APPOINTMENT (OUTPATIENT)
Age: 73
End: 2023-12-14

## 2023-12-14 NOTE — GOALS
[Patient] : patient [Staff: _____] : staff - [unfilled] [FreeTextEntry1] : louise [FreeTextEntry3] : [phone] [FreeTextEntry2] : daughter

## 2023-12-16 ENCOUNTER — APPOINTMENT (OUTPATIENT)
Dept: HEMATOLOGY ONCOLOGY | Facility: CLINIC | Age: 73
End: 2023-12-16

## 2023-12-16 ENCOUNTER — APPOINTMENT (OUTPATIENT)
Dept: INFUSION THERAPY | Facility: HOSPITAL | Age: 73
End: 2023-12-16

## 2023-12-27 ENCOUNTER — LABORATORY RESULT (OUTPATIENT)
Age: 73
End: 2023-12-27

## 2023-12-28 ENCOUNTER — LABORATORY RESULT (OUTPATIENT)
Age: 73
End: 2023-12-28

## 2023-12-30 ENCOUNTER — RESULT REVIEW (OUTPATIENT)
Age: 73
End: 2023-12-30

## 2023-12-30 ENCOUNTER — APPOINTMENT (OUTPATIENT)
Dept: HEMATOLOGY ONCOLOGY | Facility: CLINIC | Age: 73
End: 2023-12-30

## 2023-12-30 ENCOUNTER — APPOINTMENT (OUTPATIENT)
Dept: INFUSION THERAPY | Facility: HOSPITAL | Age: 73
End: 2023-12-30

## 2023-12-31 LAB
ALBUMIN SERPL ELPH-MCNC: 4.4 G/DL — SIGNIFICANT CHANGE UP (ref 3.3–5)
ALBUMIN SERPL ELPH-MCNC: 4.4 G/DL — SIGNIFICANT CHANGE UP (ref 3.3–5)
ALP SERPL-CCNC: 125 U/L — HIGH (ref 40–120)
ALP SERPL-CCNC: 125 U/L — HIGH (ref 40–120)
ALT FLD-CCNC: 11 U/L — SIGNIFICANT CHANGE UP (ref 10–45)
ALT FLD-CCNC: 11 U/L — SIGNIFICANT CHANGE UP (ref 10–45)
ANION GAP SERPL CALC-SCNC: 16 MMOL/L — SIGNIFICANT CHANGE UP (ref 5–17)
ANION GAP SERPL CALC-SCNC: 16 MMOL/L — SIGNIFICANT CHANGE UP (ref 5–17)
AST SERPL-CCNC: 18 U/L — SIGNIFICANT CHANGE UP (ref 10–40)
AST SERPL-CCNC: 18 U/L — SIGNIFICANT CHANGE UP (ref 10–40)
BILIRUB SERPL-MCNC: 0.3 MG/DL — SIGNIFICANT CHANGE UP (ref 0.2–1.2)
BILIRUB SERPL-MCNC: 0.3 MG/DL — SIGNIFICANT CHANGE UP (ref 0.2–1.2)
BUN SERPL-MCNC: 30 MG/DL — HIGH (ref 7–23)
BUN SERPL-MCNC: 30 MG/DL — HIGH (ref 7–23)
CALCIUM SERPL-MCNC: 9.4 MG/DL — SIGNIFICANT CHANGE UP (ref 8.4–10.5)
CALCIUM SERPL-MCNC: 9.4 MG/DL — SIGNIFICANT CHANGE UP (ref 8.4–10.5)
CHLORIDE SERPL-SCNC: 104 MMOL/L — SIGNIFICANT CHANGE UP (ref 96–108)
CHLORIDE SERPL-SCNC: 104 MMOL/L — SIGNIFICANT CHANGE UP (ref 96–108)
CO2 SERPL-SCNC: 22 MMOL/L — SIGNIFICANT CHANGE UP (ref 22–31)
CO2 SERPL-SCNC: 22 MMOL/L — SIGNIFICANT CHANGE UP (ref 22–31)
CREAT SERPL-MCNC: 1.54 MG/DL — HIGH (ref 0.5–1.3)
CREAT SERPL-MCNC: 1.54 MG/DL — HIGH (ref 0.5–1.3)
EGFR: 48 ML/MIN/1.73M2 — LOW
EGFR: 48 ML/MIN/1.73M2 — LOW
GLUCOSE SERPL-MCNC: 134 MG/DL — HIGH (ref 70–99)
GLUCOSE SERPL-MCNC: 134 MG/DL — HIGH (ref 70–99)
LDH SERPL L TO P-CCNC: 306 U/L — HIGH (ref 50–242)
LDH SERPL L TO P-CCNC: 306 U/L — HIGH (ref 50–242)
MAGNESIUM SERPL-MCNC: 1.9 MG/DL — SIGNIFICANT CHANGE UP (ref 1.6–2.6)
MAGNESIUM SERPL-MCNC: 1.9 MG/DL — SIGNIFICANT CHANGE UP (ref 1.6–2.6)
PHOSPHATE SERPL-MCNC: 3.5 MG/DL — SIGNIFICANT CHANGE UP (ref 2.5–4.5)
PHOSPHATE SERPL-MCNC: 3.5 MG/DL — SIGNIFICANT CHANGE UP (ref 2.5–4.5)
POTASSIUM SERPL-MCNC: 3.9 MMOL/L — SIGNIFICANT CHANGE UP (ref 3.5–5.3)
POTASSIUM SERPL-MCNC: 3.9 MMOL/L — SIGNIFICANT CHANGE UP (ref 3.5–5.3)
POTASSIUM SERPL-SCNC: 3.9 MMOL/L — SIGNIFICANT CHANGE UP (ref 3.5–5.3)
POTASSIUM SERPL-SCNC: 3.9 MMOL/L — SIGNIFICANT CHANGE UP (ref 3.5–5.3)
PROT SERPL-MCNC: 7.2 G/DL — SIGNIFICANT CHANGE UP (ref 6–8.3)
PROT SERPL-MCNC: 7.2 G/DL — SIGNIFICANT CHANGE UP (ref 6–8.3)
SODIUM SERPL-SCNC: 142 MMOL/L — SIGNIFICANT CHANGE UP (ref 135–145)
SODIUM SERPL-SCNC: 142 MMOL/L — SIGNIFICANT CHANGE UP (ref 135–145)
URATE SERPL-MCNC: 8.1 MG/DL — SIGNIFICANT CHANGE UP (ref 3.4–8.8)
URATE SERPL-MCNC: 8.1 MG/DL — SIGNIFICANT CHANGE UP (ref 3.4–8.8)

## 2024-01-01 NOTE — DIETITIAN INITIAL EVALUATION ADULT. - PROBLEM SELECTOR PLAN 7
PHYSICAL THERAPY EVALUATION - INPATIENT     Room Number: 452/452-A  Evaluation Date: 1/1/2024  Type of Evaluation: Initial   Physician Order: PT Eval and Treat    Presenting Problem: BLE weakness, fall at home  Co-Morbidities : metastatic breast cancer, neurofibromatosis and osteoporosis  Reason for Therapy: Mobility Dysfunction and Discharge Planning    PHYSICAL THERAPY ASSESSMENT     Patient is a 63 year old female admitted 12/31/2023 for BLE weakness, fall at home. Patient's current functional deficits include impaired bed mobility, transfers, gait, stair negotiation, strength, and tolerance for activity, which are below the patient's pre-admission status. Patient in bed upon arrival. RN approved activity. Educated patient on POC and benefits of mobilization. Agreeable to participate. Patient reporting generalized body pains and R thigh spasm pain, not quantified per the pain scale. Patient with bilateral ankle swelling - patient reporting that this is chronic at baseline. Patient with bruising and swelling to BLEs secondary to her fall. Patient indicating a fear of falling; benefits from increased time, reassurance, and rest breaks as needed in order to complete functional mobility tasks.    Bed Mobility: Supervision supine>EOB. Patient tolerated sitting EOB approx 3 minutes, requiring BUE support and SBA in order to maintain static sitting balance.  Transfers: Min A sit<>stand with RW from EOB; cues provided for appropriate UE placement during functional transfers. Instruction on activity pacing upon standing to allow body time to acclimate to change in position. Tolerated static standing with BUE support on RW and CGA for approx 1 minute prior to mobilization. CGA sit<>stand with RW from toilet and bedside chair.  Ambulation: Min A>CGA with RW for 20 ft and 25 ft; shuffled steps, decreased frank speed, decreased step length, guarded. Cues for safe management of RW with turns.    Patient sitting in bedside  chair at end of session. Needs in reach and alarm activated. Father present. RN aware.      The patient's Approx Degree of Impairment: 46.58% has been calculated based on documentation in the [facility] '6 clicks' Inpatient Basic Mobility Short Form.  Research supports that patients with this level of impairment may benefit from home-health PT with initial 24 hour supervision/care in order to optimize functional independence.  Equipment Recommendation for Discharge: Rolling Walker    Patient will benefit from continued IP PT services to address these deficits in preparation for discharge.    DISCHARGE RECOMMENDATIONS  PT Discharge Recommendations: Home with home health PT;24 hour care/supervision    PLAN  PT Treatment Plan: Bed mobility;Body mechanics;Coordination;Endurance;Energy conservation;Patient education;Gait training;Strengthening;Stair training;Transfer training;Balance training  Rehab Potential : Good  Frequency (Obs): 5x/week       PHYSICAL THERAPY MEDICAL/SOCIAL HISTORY     Problem List  Principal Problem:    Weakness  Active Problems:    Multiple falls    Bilateral leg weakness    Bilateral leg edema    Stage IV breast cancer in female (HCC)    Chemotherapy induced neutropenia  (HCC)      HOME SITUATION  Home Situation  Type of Home: House  Home Layout: Two level;Bed/bath upstairs  Stairs to Enter : 1  Railing: No  Stairs to Bedroom:  (did not quantify)  Railing: Yes  Lives With: Alone  Patient Owned Equipment: None     Prior Level of Marcell: Independent with ADLs/iADLs/functional mobility    SUBJECTIVE  \"Are you going to use a gait belt?\"    PHYSICAL THERAPY EXAMINATION     OBJECTIVE  Precautions: Bed/chair alarm;Limb alert - right  Fall Risk:  (Moderate fall risk)    WEIGHT BEARING RESTRICTION  Weight Bearing Restriction: None    PAIN ASSESSMENT  Rating: Unable to rate  Location: generalized body pains and R thigh  Management Techniques: Activity promotion;Body  mechanics;Repositioning    COGNITION  Overall Cognitive Status:  WFL - within functional limits    RANGE OF MOTION AND STRENGTH ASSESSMENT  Upper extremity ROM and strength are within functional limits   Lower extremity ROM is within functional limits   Lower extremity strength is within functional limits; generalized weakness     BALANCE  Static Sitting: Good  Dynamic Sitting: Fair +  Static Standing: Fair  Dynamic Standing: Fair -    AM-PAC '6-Clicks' INPATIENT SHORT FORM - BASIC MOBILITY  How much difficulty does the patient currently have...  Patient Difficulty: Turning over in bed (including adjusting bedclothes, sheets and blankets)?: A Little   Patient Difficulty: Sitting down on and standing up from a chair with arms (e.g., wheelchair, bedside commode, etc.): A Little   Patient Difficulty: Moving from lying on back to sitting on the side of the bed?: A Little   How much help from another person does the patient currently need...   Help from Another: Moving to and from a bed to a chair (including a wheelchair)?: A Little   Help from Another: Need to walk in hospital room?: A Little   Help from Another: Climbing 3-5 steps with a railing?: A Little     AM-PAC Score:  Raw Score: 18   Approx Degree of Impairment: 46.58%   Standardized Score (AM-PAC Scale): 43.63   CMS Modifier (G-Code): CK    FUNCTIONAL ABILITY STATUS  Functional Mobility/Gait Assessment  Gait Assistance: Minimum assistance;Contact guard assist  Distance (ft): 20 ft and 25 ft  Assistive Device: Rolling walker  Pattern: Shuffle (decreased frank speed, decreased step length, guarded)    Exercise/Education Provided:  Bed mobility  Body mechanics  Energy conservation  Functional activity tolerated  Gait training  Posture  Transfer training    Patient End of Session: Up in chair;Needs met;Call light within reach;RN aware of session/findings;All patient questions and concerns addressed;Alarm set;Family present    CURRENT GOALS    Goals to be met by:  24  Patient Goal Patient's self-stated goal is: return to PLOF   Goal #1 Patient is able to demonstrate supine - sit EOB @ level: independent     Goal #1   Current Status    Goal #2 Patient is able to demonstrate transfers Sit to/from Stand at assistance level: supervision with walker - rolling     Goal #2  Current Status    Goal #3 Patient is able to ambulate 100 feet with assist device: walker - rolling at assistance level: supervision   Goal #3   Current Status    Goal #4 Patient will negotiate 3 stairs/one curb w/ assistive device and SBA   Goal #4   Current Status    Goal #5 Patient to demonstrate independence with home activity/exercise instructions provided to patient in preparation for discharge.   Goal #5   Current Status      Patient Evaluation Complexity Level:  History Moderate - 1 or 2 personal factors and/or co-morbidities   Examination of body systems Moderate - addressing a total of 3 or more elements   Clinical Presentation Moderate - Evolving   Clinical Decision Making Moderate Complexity       Gait Trainin minutes  Therapeutic Activity: 10 minutes         - not in acute flare  - c/w allopurinol 300 mg daily

## 2024-01-03 NOTE — PROGRESS NOTE ADULT - SUBJECTIVE AND OBJECTIVE BOX
Hematology Follow-up    INTERVAL HPI/OVERNIGHT EVENTS:  Patient S&E at bedside. No o/n events, patient resting comfortably. No complaints at this time. Patient denies fever, chills, dizziness, weakness, CP, palpitations, SOB, cough, N/V/D/C, dysuria, changes in bowel movements, LE edema.    VITAL SIGNS:  T(F): 97.7 (08-29-22 @ 11:45)  HR: 88 (08-29-22 @ 11:45)  BP: 120/77 (08-29-22 @ 11:45)  RR: 18 (08-29-22 @ 11:45)  SpO2: 98% (08-29-22 @ 11:45)  Wt(kg): --    PHYSICAL EXAM:    Constitutional: AAOx3, NAD,   Eyes: PERRL, EOMI, sclera non-icteric  Neck: supple, no masses, no JVD  Respiratory: CTA b/l, good air entry b/l, no wheezing, rhonchi, rales, with normal respiratory effort and no intercostal retractions  Cardiovascular: RRR, normal S1S2, no M/R/G  Gastrointestinal: soft, NTND, no masses palpable, BS normal in all four quadrants, no HSM  Extremities:  no c/c/e  Neurological: Grossly intact  Skin: Normal temperature    MEDICATIONS  (STANDING):  allopurinol 100 milliGRAM(s) Oral daily  atorvastatin 40 milliGRAM(s) Oral at bedtime  buMETAnide Injectable 1 milliGRAM(s) IV Push two times a day  chlorhexidine 2% Cloths 1 Application(s) Topical daily  dextrose 5%. 1000 milliLiter(s) (50 mL/Hr) IV Continuous <Continuous>  dextrose 5%. 1000 milliLiter(s) (100 mL/Hr) IV Continuous <Continuous>  dextrose 50% Injectable 25 Gram(s) IV Push once  dextrose 50% Injectable 12.5 Gram(s) IV Push once  dextrose 50% Injectable 25 Gram(s) IV Push once  glucagon  Injectable 1 milliGRAM(s) IntraMuscular once  hydrALAZINE 10 milliGRAM(s) Oral every 8 hours  insulin lispro (ADMELOG) corrective regimen sliding scale   SubCutaneous Before meals and at bedtime  metoprolol tartrate 12.5 milliGRAM(s) Oral every 12 hours  predniSONE   Tablet 100 milliGRAM(s) Oral daily    MEDICATIONS  (PRN):  dextrose Oral Gel 15 Gram(s) Oral once PRN Blood Glucose LESS THAN 70 milliGRAM(s)/deciliter  melatonin 3 milliGRAM(s) Oral at bedtime PRN Insomnia      No Known Allergies      LABS:                        8.4    8.55  )-----------( 237      ( 29 Aug 2022 05:55 )             26.8     08-29    140  |  101  |  97<H>  ----------------------------<  102<H>  3.4<L>   |  25  |  1.79<H>    Ca    9.4      29 Aug 2022 05:52  Phos  3.0     08-29  Mg     2.0     08-29    TPro  7.4  /  Alb  3.6  /  TBili  2.5<H>  /  DBili  x   /  AST  20  /  ALT  13  /  AlkPhos  121<H>  08-28     Lactate Dehydrogenase, Serum: 478 U/L (08-29 @ 05:52)  Haptoglobin, Serum: 93 mg/dL (08-29 @ 05:52)        RADIOLOGY & ADDITIONAL TESTS:  Studies reviewed.   H&P HPI: 89yo M with history of  HTN, HLD, LBBB, paroxismal Atrial Fibrillation, thoracic Ao aneurysm and s/p mechanical AVR presented to urgent care for left foot wound. Per roomate, and further confirmed with only son Mr Bogdan Jefferson, on presentation patient complained of left foot pain, roommate took sock of and realized of left dorsum black scar, unable to date when lesion started. Patient taken to urgent care and referred for ED evaluation. Patient evaluated at bedside denies bleeding episodes, headaches, chest pain, abdominal pain. Per outpatient chart review, patient recently seen and followed by cardiology for cardiovascular comorbidities, noticed stable INR for the most part. On recent blood work from 10/2023 noticed HbA1c 6.7% but no primary care provider following chronic care.         H&P HPI: 89yo M with history of  HTN, HLD, LBBB, paroxismal Atrial Fibrillation, thoracic Ao aneurysm and s/p mechanical AVR presented to urgent care for left foot wound. Per roomate, and further confirmed with only son Mr Bogdan Jefferson, on presentation patient complained of left foot pain, roommate took sock of and realized of left dorsum black scar, unable to date when lesion started. Patient taken to urgent care and referred for ED evaluation. Patient evaluated at bedside denies bleeding episodes, headaches, chest pain, abdominal pain. Per outpatient chart review, patient recently seen and followed by cardiology for cardiovascular comorbidities, noticed stable INR for the most part. On recent blood work from 10/2023 noticed HbA1c 6.7% but no primary care provider following chronic care.         H&P HPI: 89yo M with history of  HTN, HLD, LBBB, paroxismal Atrial Fibrillation, thoracic Ao aneurysm and s/p mechanical AVR presented to urgent care for left foot wound. Per roomate, and further confirmed with only son Mr Bogdan Jefferson, on presentation patient complained of left foot pain, roommate took sock of and realized of left dorsum black scar, unable to date when lesion started. Patient taken to urgent care and referred for ED evaluation. Patient evaluated at bedside denies bleeding episodes, headaches, chest pain, abdominal pain. Per outpatient chart review, patient recently seen and followed by cardiology for cardiovascular comorbidities, noticed stable INR for the most part. On recent blood work from 10/2023 noticed HbA1c 6.7% but no primary care provider following chronic care.    Interval hx: Patient feeling well today, no complaints. No overnight events.     REVIEW OF SYSTEMS:  CONSTITUTIONAL: (-) weakness, (-) fevers, (-) chills  EYES/ENT: (-) visual changes,  (-) vertigo,  (-) throat pain   NECK:  (-) pain, (-) stiffness  RESPIRATORY:  (-) shortness of breath, (-) cough,  (-) wheezing,  (-) hemoptysis   CARDIOVASCULAR:  (-) chest pain, (-) palpitations  GASTROINTESTINAL:  (-) abdominal or epigastric pain, (-) nausea, (-) vomiting, (-) diarrhea, (-) constipation, (-) melena,  (-) hematemesis,  (-) hematochezia  GENITOURINARY: (-) dysuria, (-) frequency, (-) hematuria  NEUROLOGICAL: (-) numbness, (-) weakness  SKIN: (-) itching, (-) rashes, (-) lesions    Vital Signs Last 24 Hrs  T(C): 36.3 (03 Jan 2024 12:29), Max: 36.3 (02 Jan 2024 20:00)  T(F): 97.4 (03 Jan 2024 12:29), Max: 97.4 (02 Jan 2024 20:00)  HR: 90 (03 Jan 2024 12:29) (88 - 98)  BP: 128/70 (03 Jan 2024 12:29) (109/58 - 138/74)  BP(mean): --  RR: 16 (03 Jan 2024 12:29) (14 - 18)  SpO2: 95% (03 Jan 2024 12:29) (94% - 96%)    Parameters below as of 03 Jan 2024 12:29  Patient On (Oxygen Delivery Method): room air    PHYSICAL EXAM:  GENERAL: NAD  HEENT:  AT/NC, anicteric, moist mucous membranes, EOMI, no lid-lag, conjunctiva and sclera clear  CHEST/LUNG:  CTA b/l, no rales, wheezes, or rhonchi,  normal respiratory effort, no intercostal retractions  HEART:  RRR, S1, S2, no murmurs; no pitting edema  ABDOMEN:  BS+, soft, nontender, nondistended; No HSM  MSK/EXTREMITIES: Palpable B/L pulses, black ehscar on L foot dorsum   NERVOUS SYSTEM: answers questions and follows commands appropriately  PSYCH: Appropriate affect, Alert & Awake; Good judgement    MEDICATIONS:  MEDICATIONS  (STANDING):  atorvastatin 10 milliGRAM(s) Oral at bedtime  cefepime   IVPB      dextrose 5%. 1000 milliLiter(s) (50 mL/Hr) IV Continuous <Continuous>  dextrose 5%. 1000 milliLiter(s) (100 mL/Hr) IV Continuous <Continuous>  dextrose 50% Injectable 25 Gram(s) IV Push once  dextrose 50% Injectable 12.5 Gram(s) IV Push once  dextrose 50% Injectable 25 Gram(s) IV Push once  glucagon  Injectable 1 milliGRAM(s) IntraMuscular once  influenza  Vaccine (HIGH DOSE) 0.7 milliLiter(s) IntraMuscular once  insulin glargine Injectable (LANTUS) 10 Unit(s) SubCutaneous at bedtime  insulin lispro Injectable (ADMELOG) 4 Unit(s) SubCutaneous three times a day before meals  nadolol 20 milliGRAM(s) Oral daily  vancomycin  IVPB      vancomycin  IVPB 750 milliGRAM(s) IV Intermittent every 24 hours      LABS: All Labs Reviewed:                        12.1   12.20 )-----------( 241      ( 03 Jan 2024 07:23 )             35.8     01-03    140  |  103  |  42<H>  ----------------------------<  151<H>  3.8   |  26  |  1.96<H>    Ca    9.1      03 Jan 2024 07:23    TPro  7.2  /  Alb  2.5<L>  /  TBili  0.8  /  DBili  x   /  AST  25  /  ALT  20  /  AlkPhos  73  01-03    PT/INR - ( 03 Jan 2024 07:23 )   PT: 42.2 sec;   INR: 3.75 ratio               Blood Culture: 12-31 @ 13:38  Organism --  Gram Stain Blood -- Gram Stain --  Specimen Source Clean Catch Clean Catch (Midstream)  Culture-Blood --    12-31 @ 13:05  Organism --  Gram Stain Blood -- Gram Stain --  Specimen Source .Blood Blood-Peripheral  Culture-Blood --    12-31 @ 13:00  Organism --  Gram Stain Blood -- Gram Stain --  Specimen Source .Blood Blood-Peripheral  Culture-Blood --      I&O's Summary    02 Jan 2024 07:01  -  03 Jan 2024 07:00  --------------------------------------------------------  IN: 900 mL / OUT: 0 mL / NET: 900 mL      CAPILLARY BLOOD GLUCOSE      POCT Blood Glucose.: 136 mg/dL (03 Jan 2024 11:40)  POCT Blood Glucose.: 155 mg/dL (03 Jan 2024 08:10)  POCT Blood Glucose.: 217 mg/dL (02 Jan 2024 21:59)  POCT Blood Glucose.: 197 mg/dL (02 Jan 2024 17:10)      RADIOLOGY/EKG:                 H&P HPI: 89yo M with history of  HTN, HLD, LBBB, paroxismal Atrial Fibrillation, thoracic Ao aneurysm and s/p mechanical AVR presented to urgent care for left foot wound. Per roomate, and further confirmed with only son Mr Bogdan Jefferson, on presentation patient complained of left foot pain, roommate took sock of and realized of left dorsum black scar, unable to date when lesion started. Patient taken to urgent care and referred for ED evaluation. Patient evaluated at bedside denies bleeding episodes, headaches, chest pain, abdominal pain. Per outpatient chart review, patient recently seen and followed by cardiology for cardiovascular comorbidities, noticed stable INR for the most part. On recent blood work from 10/2023 noticed HbA1c 6.7% but no primary care provider following chronic care.    Interval hx: Patient feeling well today, no complaints. No overnight events.     REVIEW OF SYSTEMS:  CONSTITUTIONAL: (-) weakness, (-) fevers, (-) chills  EYES/ENT: (-) visual changes,  (-) vertigo,  (-) throat pain   NECK:  (-) pain, (-) stiffness  RESPIRATORY:  (-) shortness of breath, (-) cough,  (-) wheezing,  (-) hemoptysis   CARDIOVASCULAR:  (-) chest pain, (-) palpitations  GASTROINTESTINAL:  (-) abdominal or epigastric pain, (-) nausea, (-) vomiting, (-) diarrhea, (-) constipation, (-) melena,  (-) hematemesis,  (-) hematochezia  GENITOURINARY: (-) dysuria, (-) frequency, (-) hematuria  NEUROLOGICAL: (-) numbness, (-) weakness  SKIN: (-) itching, (-) rashes, (-) lesions    Vital Signs Last 24 Hrs  T(C): 36.3 (03 Jan 2024 12:29), Max: 36.3 (02 Jan 2024 20:00)  T(F): 97.4 (03 Jan 2024 12:29), Max: 97.4 (02 Jan 2024 20:00)  HR: 90 (03 Jan 2024 12:29) (88 - 98)  BP: 128/70 (03 Jan 2024 12:29) (109/58 - 138/74)  BP(mean): --  RR: 16 (03 Jan 2024 12:29) (14 - 18)  SpO2: 95% (03 Jan 2024 12:29) (94% - 96%)    Parameters below as of 03 Jan 2024 12:29  Patient On (Oxygen Delivery Method): room air    PHYSICAL EXAM:  GENERAL: NAD  HEENT:  AT/NC, anicteric, moist mucous membranes, EOMI, no lid-lag, conjunctiva and sclera clear  CHEST/LUNG:  CTA b/l, no rales, wheezes, or rhonchi,  normal respiratory effort, no intercostal retractions  HEART:  RRR, S1, S2, no murmurs; no pitting edema  ABDOMEN:  BS+, soft, nontender, nondistended; No HSM  MSK/EXTREMITIES: Palpable B/L pulses, black ehscar on L foot dorsum   NERVOUS SYSTEM: answers questions and follows commands appropriately  PSYCH: Appropriate affect, Alert & Awake; Good judgement    MEDICATIONS:  MEDICATIONS  (STANDING):  atorvastatin 10 milliGRAM(s) Oral at bedtime  cefepime   IVPB      dextrose 5%. 1000 milliLiter(s) (50 mL/Hr) IV Continuous <Continuous>  dextrose 5%. 1000 milliLiter(s) (100 mL/Hr) IV Continuous <Continuous>  dextrose 50% Injectable 25 Gram(s) IV Push once  dextrose 50% Injectable 12.5 Gram(s) IV Push once  dextrose 50% Injectable 25 Gram(s) IV Push once  glucagon  Injectable 1 milliGRAM(s) IntraMuscular once  influenza  Vaccine (HIGH DOSE) 0.7 milliLiter(s) IntraMuscular once  insulin glargine Injectable (LANTUS) 10 Unit(s) SubCutaneous at bedtime  insulin lispro Injectable (ADMELOG) 4 Unit(s) SubCutaneous three times a day before meals  nadolol 20 milliGRAM(s) Oral daily  vancomycin  IVPB      vancomycin  IVPB 750 milliGRAM(s) IV Intermittent every 24 hours      LABS: All Labs Reviewed:                        12.1   12.20 )-----------( 241      ( 03 Jan 2024 07:23 )             35.8     01-03    140  |  103  |  42<H>  ----------------------------<  151<H>  3.8   |  26  |  1.96<H>    Ca    9.1      03 Jan 2024 07:23    TPro  7.2  /  Alb  2.5<L>  /  TBili  0.8  /  DBili  x   /  AST  25  /  ALT  20  /  AlkPhos  73  01-03    PT/INR - ( 03 Jan 2024 07:23 )   PT: 42.2 sec;   INR: 3.75 ratio               Blood Culture: 12-31 @ 13:38  Organism --  Gram Stain Blood -- Gram Stain --  Specimen Source Clean Catch Clean Catch (Midstream)  Culture-Blood --    12-31 @ 13:05  Organism --  Gram Stain Blood -- Gram Stain --  Specimen Source .Blood Blood-Peripheral  Culture-Blood --    12-31 @ 13:00  Organism --  Gram Stain Blood -- Gram Stain --  Specimen Source .Blood Blood-Peripheral  Culture-Blood --      I&O's Summary    02 Jan 2024 07:01  -  03 Jan 2024 07:00  --------------------------------------------------------  IN: 900 mL / OUT: 0 mL / NET: 900 mL      CAPILLARY BLOOD GLUCOSE      POCT Blood Glucose.: 136 mg/dL (03 Jan 2024 11:40)  POCT Blood Glucose.: 155 mg/dL (03 Jan 2024 08:10)  POCT Blood Glucose.: 217 mg/dL (02 Jan 2024 21:59)  POCT Blood Glucose.: 197 mg/dL (02 Jan 2024 17:10)      RADIOLOGY/EKG:

## 2024-01-04 ENCOUNTER — OUTPATIENT (OUTPATIENT)
Dept: OUTPATIENT SERVICES | Facility: HOSPITAL | Age: 74
LOS: 1 days | Discharge: HOME | End: 2024-01-04

## 2024-01-04 DIAGNOSIS — C85.90 NON-HODGKIN LYMPHOMA, UNSPECIFIED, UNSPECIFIED SITE: Chronic | ICD-10-CM

## 2024-01-04 DIAGNOSIS — C82.20 FOLLICULAR LYMPHOMA GRADE III, UNSPECIFIED, UNSPECIFIED SITE: ICD-10-CM

## 2024-01-04 DIAGNOSIS — Z95.0 PRESENCE OF CARDIAC PACEMAKER: Chronic | ICD-10-CM

## 2024-01-11 ENCOUNTER — RX RENEWAL (OUTPATIENT)
Age: 74
End: 2024-01-11

## 2024-01-11 RX ORDER — ATORVASTATIN CALCIUM 40 MG/1
40 TABLET, FILM COATED ORAL DAILY
Qty: 90 | Refills: 1 | Status: ACTIVE | COMMUNITY
Start: 2023-03-14 | End: 1900-01-01

## 2024-01-13 ENCOUNTER — APPOINTMENT (OUTPATIENT)
Dept: HEMATOLOGY ONCOLOGY | Facility: CLINIC | Age: 74
End: 2024-01-13

## 2024-01-13 ENCOUNTER — APPOINTMENT (OUTPATIENT)
Dept: INFUSION THERAPY | Facility: HOSPITAL | Age: 74
End: 2024-01-13

## 2024-01-13 ENCOUNTER — RESULT REVIEW (OUTPATIENT)
Age: 74
End: 2024-01-13

## 2024-01-13 LAB
BASOPHILS # BLD AUTO: 0.03 K/UL — SIGNIFICANT CHANGE UP (ref 0–0.2)
BASOPHILS # BLD AUTO: 0.03 K/UL — SIGNIFICANT CHANGE UP (ref 0–0.2)
BASOPHILS NFR BLD AUTO: 0.6 % — SIGNIFICANT CHANGE UP (ref 0–2)
BASOPHILS NFR BLD AUTO: 0.6 % — SIGNIFICANT CHANGE UP (ref 0–2)
EOSINOPHIL # BLD AUTO: 0.28 K/UL — SIGNIFICANT CHANGE UP (ref 0–0.5)
EOSINOPHIL # BLD AUTO: 0.28 K/UL — SIGNIFICANT CHANGE UP (ref 0–0.5)
EOSINOPHIL NFR BLD AUTO: 5.7 % — SIGNIFICANT CHANGE UP (ref 0–6)
EOSINOPHIL NFR BLD AUTO: 5.7 % — SIGNIFICANT CHANGE UP (ref 0–6)
HCT VFR BLD CALC: 33.8 % — LOW (ref 39–50)
HCT VFR BLD CALC: 33.8 % — LOW (ref 39–50)
HGB BLD-MCNC: 11.1 G/DL — LOW (ref 13–17)
HGB BLD-MCNC: 11.1 G/DL — LOW (ref 13–17)
IMM GRANULOCYTES NFR BLD AUTO: 0.6 % — SIGNIFICANT CHANGE UP (ref 0–0.9)
IMM GRANULOCYTES NFR BLD AUTO: 0.6 % — SIGNIFICANT CHANGE UP (ref 0–0.9)
LYMPHOCYTES # BLD AUTO: 0.49 K/UL — LOW (ref 1–3.3)
LYMPHOCYTES # BLD AUTO: 0.49 K/UL — LOW (ref 1–3.3)
LYMPHOCYTES # BLD AUTO: 10 % — LOW (ref 13–44)
LYMPHOCYTES # BLD AUTO: 10 % — LOW (ref 13–44)
MCHC RBC-ENTMCNC: 31 PG — SIGNIFICANT CHANGE UP (ref 27–34)
MCHC RBC-ENTMCNC: 31 PG — SIGNIFICANT CHANGE UP (ref 27–34)
MCHC RBC-ENTMCNC: 32.8 G/DL — SIGNIFICANT CHANGE UP (ref 32–36)
MCHC RBC-ENTMCNC: 32.8 G/DL — SIGNIFICANT CHANGE UP (ref 32–36)
MCV RBC AUTO: 94.4 FL — SIGNIFICANT CHANGE UP (ref 80–100)
MCV RBC AUTO: 94.4 FL — SIGNIFICANT CHANGE UP (ref 80–100)
MONOCYTES # BLD AUTO: 0.61 K/UL — SIGNIFICANT CHANGE UP (ref 0–0.9)
MONOCYTES # BLD AUTO: 0.61 K/UL — SIGNIFICANT CHANGE UP (ref 0–0.9)
MONOCYTES NFR BLD AUTO: 12.4 % — SIGNIFICANT CHANGE UP (ref 2–14)
MONOCYTES NFR BLD AUTO: 12.4 % — SIGNIFICANT CHANGE UP (ref 2–14)
NEUTROPHILS # BLD AUTO: 3.48 K/UL — SIGNIFICANT CHANGE UP (ref 1.8–7.4)
NEUTROPHILS # BLD AUTO: 3.48 K/UL — SIGNIFICANT CHANGE UP (ref 1.8–7.4)
NEUTROPHILS NFR BLD AUTO: 70.7 % — SIGNIFICANT CHANGE UP (ref 43–77)
NEUTROPHILS NFR BLD AUTO: 70.7 % — SIGNIFICANT CHANGE UP (ref 43–77)
NRBC # BLD: 0 /100 WBCS — SIGNIFICANT CHANGE UP (ref 0–0)
NRBC # BLD: 0 /100 WBCS — SIGNIFICANT CHANGE UP (ref 0–0)
PLATELET # BLD AUTO: 98 K/UL — LOW (ref 150–400)
PLATELET # BLD AUTO: 98 K/UL — LOW (ref 150–400)
RBC # BLD: 3.58 M/UL — LOW (ref 4.2–5.8)
RBC # BLD: 3.58 M/UL — LOW (ref 4.2–5.8)
RBC # FLD: 15.8 % — HIGH (ref 10.3–14.5)
RBC # FLD: 15.8 % — HIGH (ref 10.3–14.5)
WBC # BLD: 4.92 K/UL — SIGNIFICANT CHANGE UP (ref 3.8–10.5)
WBC # BLD: 4.92 K/UL — SIGNIFICANT CHANGE UP (ref 3.8–10.5)
WBC # FLD AUTO: 4.92 K/UL — SIGNIFICANT CHANGE UP (ref 3.8–10.5)
WBC # FLD AUTO: 4.92 K/UL — SIGNIFICANT CHANGE UP (ref 3.8–10.5)

## 2024-01-15 ENCOUNTER — NON-APPOINTMENT (OUTPATIENT)
Age: 74
End: 2024-01-15

## 2024-01-15 ENCOUNTER — APPOINTMENT (OUTPATIENT)
Dept: CARDIOLOGY | Facility: CLINIC | Age: 74
End: 2024-01-15
Payer: MEDICARE

## 2024-01-15 VITALS
OXYGEN SATURATION: 99 % | HEART RATE: 60 BPM | BODY MASS INDEX: 21.9 KG/M2 | SYSTOLIC BLOOD PRESSURE: 134 MMHG | WEIGHT: 157 LBS | DIASTOLIC BLOOD PRESSURE: 70 MMHG

## 2024-01-15 VITALS — DIASTOLIC BLOOD PRESSURE: 70 MMHG | HEART RATE: 60 BPM | SYSTOLIC BLOOD PRESSURE: 120 MMHG

## 2024-01-15 DIAGNOSIS — I43 HYPERTENSIVE HEART DISEASE WITH HEART FAILURE: ICD-10-CM

## 2024-01-15 DIAGNOSIS — I45.9 CONDUCTION DISORDER, UNSPECIFIED: ICD-10-CM

## 2024-01-15 DIAGNOSIS — I25.10 ATHEROSCLEROTIC HEART DISEASE OF NATIVE CORONARY ARTERY W/OUT ANGINA PECTORIS: ICD-10-CM

## 2024-01-15 DIAGNOSIS — I11.0 HYPERTENSIVE HEART DISEASE WITH HEART FAILURE: ICD-10-CM

## 2024-01-15 PROCEDURE — G2211 COMPLEX E/M VISIT ADD ON: CPT

## 2024-01-15 PROCEDURE — 99214 OFFICE O/P EST MOD 30 MIN: CPT

## 2024-01-15 PROCEDURE — 93000 ELECTROCARDIOGRAM COMPLETE: CPT

## 2024-01-15 NOTE — DISCUSSION/SUMMARY
[Patient] : the patient [EKG obtained to assist in diagnosis and management of assessed problem(s)] : EKG obtained to assist in diagnosis and management of assessed problem(s) 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 [Cardiomyopathy] : cardiomyopathy [NYHA Class II] : NYHA Class II [Hypertensive Cardiomyopathy] : hypertensive cardiomyopathy [Resynchronization Therapy] : resynchronization therapy [Coronary Artery Disease] : coronary artery disease [Systolic Heart Failure] : systolic heart failure [Compensated] : compensated [Essential Hypertension] : essential hypertension [Responding to Treatment] : responding to treatment [None] : There are no changes in medication management [de-identified] : 100% bi-ventricular paced [FreeTextEntry1] : discussed need to follow up with pacemaker monitoring. He has disconnected monitoring device and missed recent EP appointment. He and daughter will call to reschedule [de-identified] : off all diuretic and has no edema  [de-identified] : off diuretics, off ACE-i, blood pressure satisfactory [de-identified] : Too many instances of failing to take medications, today he says it is because going to internist and instructed to not eat for blood tests [FreeTextEntry2] : and daughter

## 2024-01-15 NOTE — HISTORY OF PRESENT ILLNESS
[FreeTextEntry1] : Mr. 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 He was roughly 30 lbs above his last discharge weight and the trigger is not taking his medications for 3 months because he felt well and did not refill prescriptions. He was also found to have atrial flutter which was a new diagnosis. Successfully diuresed 40 lbs in hospital and underwent CRT-P upgrade from [facility] due to high pacing burden. Had TE guided cardioversion and remained in sinus rhythm. Continues to feel well, now fully active, able to walk quickly up flights of stairs, walk good distances all without dyspnea and denies orthopnea.   At a prior visit found serum potassium elevated and endocrine had instructed to increase spironolactone, but contacted him to remain unchanged and avoid high potassium sources in diet. Since then doing very well, active, no dyspnea, there is no orthopnea or paroxysmal nocturnal dyspnea. Walks regularly, maybe half mile to and from stores.  Since last seen doing extremely well, no symptoms, walking outdoors for exercise. However, apixaban became too expensive (Medicare D "donut hole") and has not been taking.  Recently weight up, not feeling well, no fever, saw internist, treated with antibiotic for pneumonia and feeling better. However at pacemaker interrogation Optivol indicated volume excess. There is no orthopnea or paroxysmal nocturnal dyspnea At last visit observed good response to diuretic, making large amount of urine and feeling better, however Optivol still elevated and had runs of NSVT. Developed several prominent lymph nodes and biopsy  planned, off apixaban. Biopsy accomplished, diagnosis seems to be follicular lymphoma. Resumed apixaban.  Referred to EP to consider possible atrial tachycardia, flutter, but concluded was sinus tachycardia. He is short of breath on minimal exertion, but denies orthopnea or paroxysmal nocturnal dyspnea. One week ago switched diuretic to torsemide and dramatic response.  Admitted to hospital multiple times since last visit. Had excision of malignant mass on back, diagnosis of follicular lymphoma, started chemotherapy then admitted mid September and remained until October 3 for altered mental status.   Multiple hospital admissions 2023. Had COVID pneumonia, anemia, lymphoma. Visit few months ago from Rehab facility. Then home with wife and daughter and visiting nurses. Then in hospital again when Oncology found blood pressure low. Has no chest pain, dyspnea, orthopnea or paroxysmal nocturnal dyspnea. However, now went 6 months between visits without any cardiac issues.

## 2024-01-15 NOTE — CARDIOLOGY SUMMARY
[de-identified] : 6/24/2021 sinus rhythm with bi-ventricular paced rhythm 12/23/2021 mild sinus tachycardia, P-sense, biventricular paced. 5/24/2022 sinus rhythm 64,  P-sense and biventricular paced. 6/13/2022 sinus tachycardia 108, P-sense and biventricular pace. 7/6/2022  probable atrial tachycardia with biventricular pacing 2:1, heart rate 108. 8/2/2022 sinus tachycardia and biventricular paced at 102. 10/10/2022  2/14/2023 atrial and biventricular paced. 3/14/2023 atrial and biventricular paced at 60. 7/11/2023 atrial and biventricular paced at 64. 1/15/2024 atrial and biventricular paced at 60. [de-identified] : 6/24/2021 mild to moderate global LV dysfunction with dilated and hypertrophied LV, left atrial enlargement, normal right heart with device wire, mild to moderate MR. Compared to 11/7/2019 hospital study pulmonary hypertension improved and all other findings unchanged.

## 2024-01-15 NOTE — PHYSICAL EXAM
[Normal Rate] : normal [Rhythm Regular] : regular [Normal S1] : normal S1 [Normal S2] : normal S2 [II] : a grade 2 [Holosystolic] : holosystolic [[place]] : the murmur was transmitted to the apex [No Pitting Edema] : no pitting edema present [2+] : left 2+ [1+] : left 1+ [0] : left 0 [Dullness ____] : dullness [unfilled] [Normal] : alert and oriented, normal memory [Right Carotid Bruit] : no bruit heard over the right carotid [Left Carotid Bruit] : no bruit heard over the left carotid [de-identified] : left infraclavicular pacemaker pocket

## 2024-01-16 DIAGNOSIS — R11.2 NAUSEA WITH VOMITING, UNSPECIFIED: ICD-10-CM

## 2024-01-16 DIAGNOSIS — Z51.11 ENCOUNTER FOR ANTINEOPLASTIC CHEMOTHERAPY: ICD-10-CM

## 2024-01-27 ENCOUNTER — RESULT REVIEW (OUTPATIENT)
Age: 74
End: 2024-01-27

## 2024-01-27 ENCOUNTER — APPOINTMENT (OUTPATIENT)
Dept: INFUSION THERAPY | Facility: HOSPITAL | Age: 74
End: 2024-01-27

## 2024-01-27 ENCOUNTER — APPOINTMENT (OUTPATIENT)
Dept: HEMATOLOGY ONCOLOGY | Facility: CLINIC | Age: 74
End: 2024-01-27

## 2024-01-27 LAB
ALBUMIN SERPL ELPH-MCNC: 4.3 G/DL — SIGNIFICANT CHANGE UP (ref 3.3–5)
ALP SERPL-CCNC: 111 U/L — SIGNIFICANT CHANGE UP (ref 40–120)
ALT FLD-CCNC: 12 U/L — SIGNIFICANT CHANGE UP (ref 10–45)
ANION GAP SERPL CALC-SCNC: 12 MMOL/L — SIGNIFICANT CHANGE UP (ref 5–17)
AST SERPL-CCNC: 18 U/L — SIGNIFICANT CHANGE UP (ref 10–40)
BASOPHILS # BLD AUTO: 0.07 K/UL — SIGNIFICANT CHANGE UP (ref 0–0.2)
BASOPHILS NFR BLD AUTO: 1.5 % — SIGNIFICANT CHANGE UP (ref 0–2)
BILIRUB SERPL-MCNC: 0.6 MG/DL — SIGNIFICANT CHANGE UP (ref 0.2–1.2)
BUN SERPL-MCNC: 28 MG/DL — HIGH (ref 7–23)
CALCIUM SERPL-MCNC: 9 MG/DL — SIGNIFICANT CHANGE UP (ref 8.4–10.5)
CHLORIDE SERPL-SCNC: 102 MMOL/L — SIGNIFICANT CHANGE UP (ref 96–108)
CO2 SERPL-SCNC: 27 MMOL/L — SIGNIFICANT CHANGE UP (ref 22–31)
CREAT SERPL-MCNC: 1.53 MG/DL — HIGH (ref 0.5–1.3)
EGFR: 48 ML/MIN/1.73M2 — LOW
EOSINOPHIL # BLD AUTO: 0.32 K/UL — SIGNIFICANT CHANGE UP (ref 0–0.5)
EOSINOPHIL NFR BLD AUTO: 6.9 % — HIGH (ref 0–6)
GLUCOSE SERPL-MCNC: 83 MG/DL — SIGNIFICANT CHANGE UP (ref 70–99)
HCT VFR BLD CALC: 34.8 % — LOW (ref 39–50)
HGB BLD-MCNC: 11.9 G/DL — LOW (ref 13–17)
IMM GRANULOCYTES NFR BLD AUTO: 1.1 % — HIGH (ref 0–0.9)
LDH SERPL L TO P-CCNC: 295 U/L — HIGH (ref 50–242)
LDH SERPL L TO P-CCNC: 321 U/L — HIGH (ref 50–242)
LYMPHOCYTES # BLD AUTO: 0.67 K/UL — LOW (ref 1–3.3)
LYMPHOCYTES # BLD AUTO: 14.3 % — SIGNIFICANT CHANGE UP (ref 13–44)
MAGNESIUM SERPL-MCNC: 1.8 MG/DL — SIGNIFICANT CHANGE UP (ref 1.6–2.6)
MAGNESIUM SERPL-MCNC: 1.8 MG/DL — SIGNIFICANT CHANGE UP (ref 1.6–2.6)
MCHC RBC-ENTMCNC: 31.2 PG — SIGNIFICANT CHANGE UP (ref 27–34)
MCHC RBC-ENTMCNC: 34.2 G/DL — SIGNIFICANT CHANGE UP (ref 32–36)
MCV RBC AUTO: 91.3 FL — SIGNIFICANT CHANGE UP (ref 80–100)
MONOCYTES # BLD AUTO: 0.67 K/UL — SIGNIFICANT CHANGE UP (ref 0–0.9)
MONOCYTES NFR BLD AUTO: 14.3 % — HIGH (ref 2–14)
NEUTROPHILS # BLD AUTO: 2.89 K/UL — SIGNIFICANT CHANGE UP (ref 1.8–7.4)
NEUTROPHILS NFR BLD AUTO: 61.9 % — SIGNIFICANT CHANGE UP (ref 43–77)
NRBC # BLD: 0 /100 WBCS — SIGNIFICANT CHANGE UP (ref 0–0)
PHOSPHATE SERPL-MCNC: 3.1 MG/DL — SIGNIFICANT CHANGE UP (ref 2.5–4.5)
PHOSPHATE SERPL-MCNC: 3.1 MG/DL — SIGNIFICANT CHANGE UP (ref 2.5–4.5)
PLATELET # BLD AUTO: 127 K/UL — LOW (ref 150–400)
POTASSIUM SERPL-MCNC: 3.7 MMOL/L — SIGNIFICANT CHANGE UP (ref 3.5–5.3)
POTASSIUM SERPL-SCNC: 3.7 MMOL/L — SIGNIFICANT CHANGE UP (ref 3.5–5.3)
PROT SERPL-MCNC: 7.1 G/DL — SIGNIFICANT CHANGE UP (ref 6–8.3)
RBC # BLD: 3.81 M/UL — LOW (ref 4.2–5.8)
RBC # FLD: 14.6 % — HIGH (ref 10.3–14.5)
SODIUM SERPL-SCNC: 141 MMOL/L — SIGNIFICANT CHANGE UP (ref 135–145)
URATE SERPL-MCNC: 6.8 MG/DL — SIGNIFICANT CHANGE UP (ref 3.4–8.8)
URATE SERPL-MCNC: 6.9 MG/DL — SIGNIFICANT CHANGE UP (ref 3.4–8.8)
WBC # BLD: 4.67 K/UL — SIGNIFICANT CHANGE UP (ref 3.8–10.5)
WBC # FLD AUTO: 4.67 K/UL — SIGNIFICANT CHANGE UP (ref 3.8–10.5)

## 2024-02-05 ENCOUNTER — RESULT REVIEW (OUTPATIENT)
Age: 74
End: 2024-02-05

## 2024-02-05 ENCOUNTER — APPOINTMENT (OUTPATIENT)
Dept: GERIATRICS | Facility: CLINIC | Age: 74
End: 2024-02-05
Payer: MEDICARE

## 2024-02-05 ENCOUNTER — APPOINTMENT (OUTPATIENT)
Dept: HEMATOLOGY ONCOLOGY | Facility: CLINIC | Age: 74
End: 2024-02-05
Payer: MEDICARE

## 2024-02-05 ENCOUNTER — APPOINTMENT (OUTPATIENT)
Dept: GERIATRICS | Facility: CLINIC | Age: 74
End: 2024-02-05

## 2024-02-05 VITALS
TEMPERATURE: 97.2 F | WEIGHT: 159.13 LBS | BODY MASS INDEX: 22.28 KG/M2 | DIASTOLIC BLOOD PRESSURE: 70 MMHG | HEIGHT: 71 IN | HEART RATE: 64 BPM | OXYGEN SATURATION: 99 % | SYSTOLIC BLOOD PRESSURE: 171 MMHG

## 2024-02-05 VITALS — DIASTOLIC BLOOD PRESSURE: 60 MMHG | SYSTOLIC BLOOD PRESSURE: 150 MMHG

## 2024-02-05 VITALS
TEMPERATURE: 97.7 F | OXYGEN SATURATION: 99 % | SYSTOLIC BLOOD PRESSURE: 158 MMHG | HEART RATE: 62 BPM | DIASTOLIC BLOOD PRESSURE: 71 MMHG | RESPIRATION RATE: 16 BRPM

## 2024-02-05 DIAGNOSIS — I50.22 CHRONIC SYSTOLIC (CONGESTIVE) HEART FAILURE: ICD-10-CM

## 2024-02-05 DIAGNOSIS — I48.92 UNSPECIFIED ATRIAL FLUTTER: ICD-10-CM

## 2024-02-05 DIAGNOSIS — E78.00 PURE HYPERCHOLESTEROLEMIA, UNSPECIFIED: ICD-10-CM

## 2024-02-05 LAB
BASOPHILS # BLD AUTO: 0.08 K/UL — SIGNIFICANT CHANGE UP (ref 0–0.2)
BASOPHILS NFR BLD AUTO: 1.6 % — SIGNIFICANT CHANGE UP (ref 0–2)
EOSINOPHIL # BLD AUTO: 0.28 K/UL — SIGNIFICANT CHANGE UP (ref 0–0.5)
EOSINOPHIL NFR BLD AUTO: 5.6 % — SIGNIFICANT CHANGE UP (ref 0–6)
HCT VFR BLD CALC: 38.3 % — LOW (ref 39–50)
HGB BLD-MCNC: 12.4 G/DL — LOW (ref 13–17)
IMM GRANULOCYTES NFR BLD AUTO: 0.4 % — SIGNIFICANT CHANGE UP (ref 0–0.9)
LYMPHOCYTES # BLD AUTO: 0.7 K/UL — LOW (ref 1–3.3)
LYMPHOCYTES # BLD AUTO: 13.9 % — SIGNIFICANT CHANGE UP (ref 13–44)
MCHC RBC-ENTMCNC: 31.1 PG — SIGNIFICANT CHANGE UP (ref 27–34)
MCHC RBC-ENTMCNC: 32.4 G/DL — SIGNIFICANT CHANGE UP (ref 32–36)
MCV RBC AUTO: 96 FL — SIGNIFICANT CHANGE UP (ref 80–100)
MONOCYTES # BLD AUTO: 0.64 K/UL — SIGNIFICANT CHANGE UP (ref 0–0.9)
MONOCYTES NFR BLD AUTO: 12.7 % — SIGNIFICANT CHANGE UP (ref 2–14)
NEUTROPHILS # BLD AUTO: 3.32 K/UL — SIGNIFICANT CHANGE UP (ref 1.8–7.4)
NEUTROPHILS NFR BLD AUTO: 65.8 % — SIGNIFICANT CHANGE UP (ref 43–77)
NRBC # BLD: 0 /100 WBCS — SIGNIFICANT CHANGE UP (ref 0–0)
PLATELET # BLD AUTO: 135 K/UL — LOW (ref 150–400)
RBC # BLD: 3.99 M/UL — LOW (ref 4.2–5.8)
RBC # FLD: 14.7 % — HIGH (ref 10.3–14.5)
WBC # BLD: 5.04 K/UL — SIGNIFICANT CHANGE UP (ref 3.8–10.5)
WBC # FLD AUTO: 5.04 K/UL — SIGNIFICANT CHANGE UP (ref 3.8–10.5)

## 2024-02-05 PROCEDURE — 99215 OFFICE O/P EST HI 40 MIN: CPT

## 2024-02-05 PROCEDURE — 99214 OFFICE O/P EST MOD 30 MIN: CPT

## 2024-02-05 NOTE — HISTORY OF PRESENT ILLNESS
[Disease:__________________________] : Disease: [unfilled] [Treatment Protocol] : Treatment Protocol [de-identified] : Initial Presentation:  71 yo with MHx significant for Atrial flutter (on Apixaban), HTN, here for further evaluation of low-level neutrophilia (since 1/2022) and lymphadenopathy. Previously NHL (around 8516-4902?). He received chemotherapy in 2004. 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 In 2010 he went to [place] and was treated for reoccurrence and was treated with bendamustine. He reports that he has had areas of swelling in his neck on and off for about 2 years which was mostly undergoing workup with his endocrinologist. In the last few months he has noticed increasing size of his left cervical LNs and a right nodule that caused swelling of his face, which has now spontaneously decreased in size significantly.  Today he reports he feels well outside of weight loss. He denies any other constitutional complaints. He weighed 192 lbs in 10/2021 which was down to 183 lbs in December 12/2021(2 months later) which has further decreased down to 179 lbs today. He has not felt himself masses outside of his neck region. On 5/14/22, he went for US duplex of his left lower extremity due to a "tangerine size lump" on the back of his left thigh, which noted a 4.4 cm hypoechoic mass in his left inguinal region without commenting on his perceived posterior thigh mass. Also, on 5/2/22 he underwent US of his thyroid and parathyroid glands where they noticed bilateral cervical lymph nodes with the largest on the left side measuring 2.1 x 1.6 x 1.9 cm and a right level 1 LN measuring 2.2 x 1.2 x 2.3 cm. They saw 3 lymph nodes on the left side and one discrete LN on the right.   He also recently just finished a 14 day course of Levofloxacin for an uncomplicated phenomena. He was having a dry cough and underwent imaging as an outpatient which found mild left and right pleural effusions, areas of consolidation on the right and retrocardiac airspace involvement (performed 5/2/22). He now feels better without infectious complaints.  In addition, he is currently anemic. He last had a colonoscopy in his recent memory. He had bleeding hemorrhoids last year treated with OTC medications. He denies any tick bites recently.   He also has MGUS with 3 separate monoclonal gammopathies I suspect is related to his NHL. He has M Judd 1 showing IgG Lambda at 0.3 g/dl, M Judd 2 showing IgG Kappa at 0.2 g/dl, M Judd 3 showing IgM Lambda (unable to quantitate). There is no suspicion for myeloma or waldenstroms at this time and his M-spikes will be monitored. He has no elevation in kappa/ lambda FLC ration, but individual light chains are both elevated, kappa at 10.77 mg/dL and lambda at 6.58 mg/dL.  Social Hx - He is currently retired. He used to be an . - No significant environmental exposure to chemicals - Lives by himself and his wife lives in [place]. - Former smoker. He smoked for 10 years less than 1 pack a day. He quit 20 years ago  Family hx - Two brothers non Hodgkins lymphoma. At least one required chemotherapy. sister had cervical cancer first diagnosed 2007 and then 3 years later with more cancer discovered - Mother and father passed away from heart disease and diabetes.  ======================================================= Interval Hx update:  He underwent PET-CT in Aug 2022 which showed FDG avid lymph nodes in the left cervical, bilateral supraclavicular regions, and chest, and a few FDG avid abdominal lymph nodes, intramuscular masses in the left posterior chest and left upper thigh, scattered FDG avid subcutaneous soft tissue/nodules with trace right pleural effusion, moderate left pleural effusion, loculated fluid in the right middle lobe. Moderate abdominal and pelvic ascites. Subcutaneous edema in the lower abdominal wall extending into the imaged bilateral thighs. Splenomegaly with mild FDG avidity, suspicious for lymphomatous involvement.  Prior to the PET-CT, he underwent biopsy of both a cervical lymph node and a left axilla lymph node. 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 There were areas of > 20 SUV on the PET-CT, repeat whole lymph node biopsy was requested to confirm suspected diagnosis of Grade 3B FL or transformed large cell lymphoma. S/p excisional biopsy of back lesion on 9/7/22 which showed recurrent DLBCL.  LP 9/26/22: protein elevated at 61, LDH 27, neutrophils 0, lymphocytes 33, monocytes 67, RBC 5. Oligoclonal bands negative. The flow cytometry failed due to insufficient cells. Recommended continued IT MTX therapy x 4 total cycles. He had an interim PET-CT performed on 2/1/23 (3 months after discontinuation of O-miniCHOP in the middle of his therapy - received 3/6 cycles) which showed possible persistent disease including a small, residual focus of increased FDG activity in the left level II region, likely corresponding to a difficult to delineate lymph node, is decreased in size and metabolism (SUV 3.5; image 33; previous SUV 16.1 and skin thickening and subcutaneous nodules, right lower anterior and lateral abdominal wall, slightly more extensive and similar in metabolism (SUV 7.1; image 170; previous SUV 5.5). Discontinued chemoimmunotherapy with Obi-miniCHOP in Nov 2022 due to treatment complications, hospitalizations, poor PS. He was subsequently started on Tafasitamab (anti-CD19) + lenalidomide since 3/30/23. Lenalidomide dose reduced from 20 mg to 15 mg due to episodes of severe neutropenia.  ======================================================= Care Providers:  PMD/ Geriatrician: Dr Allison; Tel: [phone] Endo: Dr Carter Vigil Cardiologist: Dr. Don [phone] fax [phone]  ======================================================= Contacts:  Vonnie (daughter): [phone] - takes all healthcare related calls  ======================================================= [de-identified] : PENDING [de-identified] : Fine Needle Aspiration Report - Auth (Verified)\par  Specimen(s) Submitted\par  1. AXILLA, LEFT, US GUIDED CORE BIOPSY AND FNA\par  2. LYMPH NODE, CERVICAL, LEFT POSTERIOR, US GUIDED CORE BIOPSY AND FNA\par  \par  \par  Final Diagnosis\par  1. AXILLA, LEFT, US GUIDED CORE BIOPSY AND FNA\par  POSITIVE FOR MALIGNANT CELLS.\par  B-cell lymphoma of follicular center origin, most consistent with\par  follicular lymphoma, grade 3A.  See note.\par  \par  Fine Needle Aspiration Addendum Report - Auth (Verified)\par  \par  Addendum\par  2. LYMPH NODE, CERVICAL, LEFT POSTERIOR, US GUIDED CORE BIOPSY AND FNA\par  POSITIVE FOR MALIGNANT CELLS.\par  B-cell lymphoma of follicular center origin with high proliferation index.\par  See note.\par  \par  Note:\par  The neoplastic B cells demonstrate a follicular center B-cell phenotype with MUM-1 co-expression and a high proliferation index.  Follicular dendritic meshwork is present in most areas of the biopsy, consistent\par  with follicular lymphoma.  However, there is one focal area with increased larger cells and lack of follicular dendritic meshwork. Therefore, a high grade component can not be excluded.  If clinically indicated, suggest excisional biopsy for definitive classification.\par  \par  Immunohistochemical stains   (CD3, CD5, CD10, CD20, CD21, CD23, CD30, BCL-6, BCL-2, cyclinD1, ki-67, c-MYC, PAX-5, MUM-1, p53, ISAURO) were performed on block 2C.  The neoplastic cells are positive for CD20, PAX-5, BCL-6, BCL-2, MUM-1 (partial), dim p53; negative CD5, CD10, CD30, cyclinD1, ISAURO.  CD21 and CD23 highlight follicular dendritic meshwork in most areas with focal absence of follicular dendritic meshwork. \par  Ki-67 proliferation index is approximately 60 to 70%.  C-MYC stains approximately 20 to 30% of cells.  CD3 and CD5 highlight some T-cells in the background. [de-identified] : 6/14/22 FNA of Left axilla LN: FLUORESCENCE IN-SITU HYBRIDIZATION (FISH)\par  1. No evidence of MYC Rearrangement\par  2. No evidence of BCL2-IGH [translocation t(14;18)] gene rearrangement\par  3. 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 [de-identified] : 5/18/22: Initial Visit.  6/20/22: Follow-up. Patient feels well overall. Lymphoma labs and left axilla LN biopsy revealed grade 3A follicular lymphoma, IgG Lambda and Kappa MGUS. He reports lymph nodes have been stable. Heart function is stable. He denies having any recent fevers, chills, nausea. No weight changes.  8/22/22: Follow-up. Patient feels well overall. Awaiting for biopsy results of lymph nodes in his back. He does not have pain in the left back. The left side of his neck gives him discomfort. He has never received chemotherapy nor antibody treatment in the past. No fevers, chills, night sweats. No new noticeable masses  Good appetite, lost 7 lbs (lost a lot of fluid weight due to diuretics).   10/17/22: Follow up. In the interim, he was hospitalized at [facility] twice (8/27-9/12 & 9/16-10/3). 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 Patient was treated with rasburicase, but developed rasburicase-triggered G6PD hemolysis. Also found to be in acute exacerbation of HFrEF and have a prolonged QTc (likely medication-induced). During hospital stay was found to have altered mental status. 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 In mid-September patient was hospitalized for Encephalopathy (+Rhinovirus/enterovirus) unrelated to the Lymphoma. Today, he feels at baseline. Notes resolution of cervical LNs, and back lesions since starting treatment. Patient denies fever, chills, night sweats, back pain, abdominal pain, chest pain, or shortness of breath. Fair appetite, weight loss due to prolonged hospitalizations.  11/17/22: Follow-up. On 10/28/22 at home was found down by his daughter found to have fever, STEPHEN and AMS and was admitted for acute metabolic encephalopathy with sepsis 2/2 pneumonia s/p 7 days zosyn with improvement. Today he presents from rehab with his daughter. He is not feeling well. He feels that he is weak and fatigued. He has been doing PT / walking around the facility. He reads when awake. He has a poor appetite, but his family is bringing in food that he likes. No fevers, chills, night sweats. No new lumps or masses.  2/6/23: Follow-up. He was readmitted [facility] 1/9/23 to 1/20/23 for malaise and cytopenias. In December 2022 he had pneumonia and COVID19 for which there has been a long recovery. Due to these complications, his treatment with chemoimmunotherapy (O-miniCHOP) was discontinued. He is currently staying in a rehab to improve his performance status. He overall feels well today and reports feeling much improved relative to Dec 2022. He eats 3 meals a day, but prefers to eat late at night. His appetite is good and his daughter brings a lot of food supplementation for him. He continues to lose weight however. No other recent illnesses. He had a PET-CT last week.  3/13/23: Followup. He has not yet rec'd revlimid but is now approved for free drug. Continues to have issues gaining weight, and reports a very good appetite. He has not lost weight at least. He also has been having right foot pain between the ankle and toes since the night of 3/6/23. He also has a rash on toes 1-3. He continues to have right lateral abdominal itchiness around a site of swelling / lump. This has not changed in the past month. He is now back home. He is able to ambulate with a walker. He is not limited by dyspnea. He had edema in the rehab which has resolved. Per family he has also been having intermittent periods where he is not himself and he becomes aggressive toward family members. This has been an ongoing issue for sometime   3/30/23: Follow-up. Today is Cycle 1 Day 1 of Tafa. He has not yet rec'd revlimid (expected delivery today); is approved for free drug via [employer]. Reports intentional weight gain over the past couple of weeks and ambulating with a walker. Continues to have right foot pain between the ankle and toes since the night of 3/6/23, and have right lateral abdominal itchiness around a site of swelling / lump. This has not changed in the past month. He is now back home. Per family he has intermittent periods where he is not himself and he becomes aggressive toward family members.   4/6/23: Follow-up. Today is Cycle 1 Day 8 of Tafasitamab. Did receive revlimid on day 1 and has been tolerating Revlimid well. Denies any abdominal issues, continues to have a good appetite. Ambulating with a walker but continues to have right foot pain, and have right lateral abdominal itchiness around a site of swelling / lump. Per family he has intermittent periods where he is not himself and he becomes aggressive toward family members.   4/13/23: Follow-up. Today is Cycle 1 Day 15 of Tafasitamab. He is tolerating the regimen well. Denies any abdominal issues, continues to have a good appetite. Ambulating with a walker but continues to have right foot pain, and have right lateral abdominal itchiness around a site of swelling / lump. Per family he has intermittent periods where he is not himself and he becomes aggressive toward family members.   4/20/23: Follow-up. Today is Cycle 1 Day 22. Today he reports feeling well, but has noticed his right leg / foot is swollen and associated with shoe tightness. He reports that he has been taking his apixaban which he takes for cardiac reasons. His ANC is 0, but denies any mucositis, or other infectious issues. No new lumps or masses. His right abd mass is decreasing in size.  4/27/23: Follow-up. Today is Cycle 1 Day 29. After receiving zarxio he developed a facial rash which is now self resolved. He remains off of Revlimid due to neutropenia. Has the mediport insertion scheduled for May 9th. Today he reports feeling well, his right leg / foot remains stable and swollen; associated with shoe tightness. US Duplex (4/20/23) negative for DVT. He reports that he has been taking his apixaban which he takes for cardiac reasons. Denies any mucositis, or other infectious issues. No new lumps or masses. His right abd mass is decreasing in size.  5/4/23: Follow-up. Today is Cycle 1 Day 36; has been unable to receive Tafa due to peripheral access limitation therefore cycle 2 has not been counted. He has restarted Revlimid at a lowered dose of 15mg due to neutropenia, tolerating well without neutropenia thus far. He has the mediport insertion scheduled for May 9th. Today he reports feeling well, his right leg / foot remains stable and swollen;US Duplex (4/20/23) negative for DVT. Remains on apixaban which he takes for cardiac reasons. Denies any mucositis, or other infectious issues. No new lumps or masses. His right abd mass is no longer palpable.  5/25/23: Follow-up. Today is Cycle 2 Day 15. He has a mediport in place now and is able to receive the Tafasitamab without issue, tolerating Revlimid well at a lowered dose of 15mg due to neutropenia. Today he reports feeling well, his intermittent right leg swelling is much improved. Has been hydrating well recently, given elevated BUN. Remains on apixaban which he takes for cardiac reasons. Denies any bleeding, mucositis, masses/lumps, fever, chills, or night sweats. Good appetite.  6/19/23: Follow-up. Today is Cycle 3 Day 12 of Tafa + Revlimid. Due to neutropenia about 2 weeks ago he was again advised to hold the Revlimid until further notice. He has been having swelling in his hands, left > right with significant pain (gout-like) in the left thumb. He developed a sore throat yesterday at home and was using honey to help soothe. Today he only has a sore throat when swallowing liquids. He was aslo having chills, confused and having visual hallucinations.  He denies chest pain or shortness of breath. He ambulated into the center today, per daughter he has been walking much slower. He denies any issues with bowel or urinary function. He reports he is hydrating a couple times a day per daughter up to 500 mL a day. He is still off revlimid. Blood pressure rechecked 86/58.  7/26/23: Follow-up. He feels well today. He has noticed all of his prior lymphadenopathy disappear. He is not noticing any new lumps or masses. He denies constitutional issues. His neutrophils remain in the severely low range, however he has not developed any infections. He has been taking abx ppx. We will hold tafa as it can also negatively affect neutrophils. He has a left epicondylar mass that he states he has had for a very long time (years), which we will watch.  8/16/23: Follow-up. Mr Skaggs presents today with his daughter. He just arrived from seeing a neurologist, Dr Childers regarding his memory issues and occasional personality changes. Vonnie his daughter states that the past month has been better. He also has been evaluated at the [facility] clinic in [place] for his past exposures and is awaiting word on the status of that. His left elbow area swelling has not changed. He has not noticed any new lumps or masses. No constitutional issues. His neutrophils have since recovered and were likely low primarily due to Monjuvi (tafa). ROS was negative for other issues.  9/6/23: Follow up. Patient reports feeling well today. He gained 8lbs since last visit. His WBC 5.29, Hb 10.4, Plt 98 today. No concerning signs of symptoms today.  10/18/23: Follow-up. Today is Cycle 6 Day 26. He feels well today. He has not noticed any new lumps or masses. All prior masses have resolved. He has not had any new infections or constitutional issues. He remains active at home without health related limitations.  12/6/23: Follow-up. The prior 2 treatments (2 weeks) have been held due to asymptomatic neutropenia. His ANC is starting to recover today. He has been off Revlimid for over 2 weeks. He has not received Tafa since 11/4/23. Will aim for next dose 12/30/23 with a CBC check 12/27/23. No active issues today. No lumps or masses per hx or exam. No recent illnesses, fevers, constitutional issues.  2/5/24: Follow-up. He saw Dr Munguia (Geriatrics) earlier today to discuss general medical issues. His BP was elevated and was repeated today here in the oncology office at 158/71, noted Hx of white coat syndrome. He feels overall well. No new issues. He denies any lumps and masses. All former masses have resolved. He has not had any recent infections. His ANC has remained >1000 since Dec 28, 2023. No constitutional issues. He is tolerating therapy without any side effects currently.  A comprehensive review of systems was performed including constitutional, eyes, ENT, cardiovascular, respiratory, gastrointestinal, genitourinary, musculoskeletal, integumentary, neurological, psychiatric and hematologic / lymphatic. All pertinent positives are included in the H&P under interval history above and the remaining review of systems listed are negative.   ==================================================== Treatment Hx:  Obinutuzumab-mini-COEP q21 days x 3 cycles (incomplete due to toxicities and patient preference to switch to more tolerable regimen) (Obinutuzumab modified mini-COEP: 400 mg/m cyclophosphamide, Etoposide (40% dose reduced to 30 mg/m2 IV on day 1 and 60 mg/m2 PO on days 2 and 3 of each cycle), and 2 mg vincristine (flat dose) on day 1 of each cycle, and 100 mg prednisone on days 1-5. Modified from Man et al 2009 Blood "R-CHOP with Etoposide Substituted for Doxorubicin (R-CEOP): Excellent Outcome in Diffuse Large B Cell Lymphoma for Patients with a Contraindication to Anthracyclines." with mini- dosing for elderly patients) - Cycle 1 Day 1 given 9/2/22 - third dose of Obinutuzumab held due to AMS, requiring admission to [facility] found to have enterovirus infection - Cycle 2 Day 1 given 9/30/22 - Given as an inpatient at [facility] - Cycle 3 Day 1 given 10/21/22 - Complicated by pneumonia, requiring admission and rehab placement  Tafasitamab plus Revlimid (changed therapy due to patient and family's wishes due to intolerable toxicities), On 28 day cycles. - Cycle 1 Day 1 on 3/30/23 (HELD revlimid briefly starting 4/20/23 due to neutropenia) - Cycle 2 Day 1 on 5/11/23 (HELD Revlimid since end of May, not yet restarted due to prolonged neutropenia) - Cycle 3 Day 1 on 6/8/23 (On 6/29/23 during cycle 3 he had experienced hypotension down to 86/58 in setting of sore throat and chills, confusion with visual hallucinations in the setting of neutropenia and was sent to [facility] ED, delayed tx 1 week) - Cycle 4 day 1 on 7/15/23 - Cycle 5 Day 1 was HELD on 8/12/23 for persistent severe neutropenia without infection / complications, delayed until 8/26/23 - Cycle 6 Day 1 on 9/23/23 - Cycle 7 Day 1 on 10/21/23 (HELD treatment related to severe neutropenia) - Cycle 8 Day 1 now planned for 12/30/23 (was initially on 11/18/23, however the entire cycle has been held due to severe neutropenia) - Cycle 9 Day 1 on 1/27/24  ==================================================== [FreeTextEntry1] : Tafasitamab plus Revlimid 20 mg daily 21/28 days. Revlimid was held mid May 2023 to the end of Aug 2023 due to Neutropenia. Also Tafasitamab has been intermittently held due to neutropenia, will resume on 8/26/23

## 2024-02-05 NOTE — HISTORY OF PRESENT ILLNESS
[No falls in past year] : Patient reported no falls in the past year [Little interest or pleasure doing things] : 1) Little interest or pleasure doing things [Feeling down, depressed, or hopeless] : 2) Feeling down, depressed, or hopeless [0] : 2) Feeling down, depressed, or hopeless: Not at all (0) [PHQ-2 Negative - No further assessment needed] : PHQ-2 Negative - No further assessment needed [FreeTextEntry1] : 74 yo male w/ a hx of htn, a fib, CHF, CAD, follicular lymphoma w/ DLBCL s/p R-CHOP presents to the office for a follow up visit. Patient states he is doing very well. No acute complaints reported. He follows up with cardiology and Heme/onc. Last saw cardiologist on 1/15 - no changes in treatment. Last saw oncologist on 12/6 - patient gets biweekly treatment. He is tolerating treatment well. No recent hospitalizations reported.   HTN blood pressure noted to be elevated 171/70. Repeat bp in the office 150/60. Pt is taking Metoprolol 25mg daily.  Off diuretics and ace inh due to hx of multiple falls.   CHF Compensated.  Taking metoprolol 25mg daily.  Atrial flutter/a fib Taking metoprolol 25mg and eliquis 5mg bid.  Denies any bleeding or excessive bruising.   Cognitive impairment  Has support from his daughter.  Daughter states he is stable.  [[id number]] : 0

## 2024-02-05 NOTE — PROGRESS NOTE ADULT - ASSESSMENT
72 year old male with afib (on eliquis), HTN,  complete heart block s/p PPM, HLD, HFrEF (30% in 09/2022), and DLBCL (tx with R-CHOP in 2003, with relapse in 2009 treated with BR) now with recently diagnosed grade 3 follicular lymphoma who was transferred from [facility] for acute on chronic encephalopathy likely 2/2 rhino/entero viral pneumonia i/s/o follicular lymphoma and ongoing chemotherapy treatment. Possible etiologies include drug induced 2/2 chemo vs autoimmune vs paraneoplastic.     Wkdn 10/1: will f/u w onc if patient getting more chemo inpatient, otherwise patient is doing well, refused labs today. Phlebotomy will try again if they can come back because patient might consider labs if done by phlebotomy.    No 72 year old male with afib (on eliquis), HTN,  complete heart block s/p PPM, HLD, HFrEF (30% in 09/2022), and DLBCL (tx with R-CHOP in 2003, with relapse in 2009 treated with BR) now with recently diagnosed grade 3 follicular lymphoma who was transferred from [facility] for acute on chronic encephalopathy likely 2/2 rhino/entero viral pneumonia i/s/o follicular lymphoma and ongoing chemotherapy treatment. Possible etiologies include drug induced 2/2 chemo vs autoimmune vs paraneoplastic.

## 2024-02-05 NOTE — PHYSICAL EXAM
[Alert] : alert [No Acute Distress] : in no acute distress [EOMI] : extraocular movements were intact [Normal Appearance] : the appearance of the neck was normal [Supple] : the neck was supple [No Respiratory Distress] : no respiratory distress [No Acc Muscle Use] : no accessory muscle use [Respiration, Rhythm And Depth] : normal respiratory rhythm and effort [Auscultation Breath Sounds / Voice Sounds] : lungs were clear to auscultation bilaterally [Normal S1, S2] : normal S1 and S2 [Heart Rate And Rhythm] : heart rate was normal and rhythm regular [Bowel Sounds] : normal bowel sounds [Abdomen Tenderness] : non-tender [Abdomen Soft] : soft [No Spinal Tenderness] : no spinal tenderness [No Focal Deficits] : no focal deficits [Oriented To Time, Place, And Person] : oriented to person, place, and time [Normal Affect] : the affect was normal [Normal Mood] : the mood was normal

## 2024-02-05 NOTE — REVIEW OF SYSTEMS
[Fever] : no fever [Chills] : no chills [Heart Rate Is Slow] : the heart rate was not slow [Heart Rate Is Fast] : the heart rate was not fast [Chest Pain] : no chest pain [Palpitations] : no palpitations [Lower Ext Edema] : no extremity edema [Shortness Of Breath] : no shortness of breath [Wheezing] : no wheezing [Cough] : no cough [SOB on Exertion] : no shortness of breath during exertion [Abdominal Pain] : no abdominal pain [Vomiting] : no vomiting [Constipation] : no constipation [Diarrhea] : no diarrhea [Skin Lesions] : no skin lesions [Confused] : no confusion [Dizziness] : no dizziness

## 2024-02-05 NOTE — ASSESSMENT
[Palliative] : Goals of care discussed with patient: Palliative [FreeTextEntry1] : 74 yo male with PMHx significant for follicular lymphoma with DLBCL (tx with R-CHOP in 2003, with relapse in 2009, treated with BR) with his initial presentation at [facility] with multiple areas of palpable lymphadenopathy with follicular lymphoma grade IIIA (IPI score 3) in the setting of a steady decline in weight > 20 lbs in the previous 6 months without other constitutional complaints. 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  Due to symptoms / clinical picture, he was started on therapy. He is s/p 3 cycles of obinutuzumab + mini-CHOP, however this was d/c'd due to intolerability / complications. He is now on second-line therapy with Tafasitamab (anti-CD19) + lenalidomide (15 mg 21/28 days) since 3/30/23. Due to severe neutropenia he had mu;tiple treatment delays (discussed above in Treatment Hx). Today Cycle 9 Day 10 of Tafasitamab + lenalidomide due to delays.  Plan: - Repeat Lymphoma labs once every 4 weeks during treatment visit, during physician visit he only needs Pre-F (fingerstick for CBC) - Continue Tafasitamab 12 mg/kg every 2 weeks up to 12 cycles or progression. Next treatment Cycle 9 Day 15 this Saturday. HOLD for ANC <500. - Plan to check response to Tafa + Rev, plan for repeat PET-CT now, last PET-CT had residual disease, will potentially determine length of the treatment, may cut back if KENYATTA - VTE ppx while on Revlimid; on Apixaban for cardiac issues, Continue 5 mg q12hrs. No bleeding or bruising issues. - Continue Allopurinol 100 mg daily given hx of TLS and hyperuricemia, has G6PD deficiency. UA is usually in the range 6-8., continue to check once a month Uric acid levels. - Antiviral ppx: Acyclovir 400 mg BID - Behavioral Health Issues: Has been improving. Established care with Dr Childers (neuro-onc). No neurologic or other active issues identified. - HCM: HTN, takes metoprolol succinate 25 mg once a day, has possible white coat syndrome, gets better over the course of the visit. - Follow up monthly or sooner as needed for any issue  ___ I personally have spent a total of 40 minutes of time on the date of this encounter reviewing test results, documenting findings, providing education, coordinating care and directly consulting with the patient and/or designated family member.

## 2024-02-05 NOTE — ASSESSMENT
[FreeTextEntry1] : Advised to get Covid-19 booster, shingles and RSV vaccine.  Blood work ordered - to be done with the oncology labs.  Recommend colonoscopy - pt will think about it.  Offered  support.

## 2024-02-09 DIAGNOSIS — E55.9 VITAMIN D DEFICIENCY, UNSPECIFIED: ICD-10-CM

## 2024-02-09 LAB
25(OH)D3 SERPL-MCNC: 19 NG/ML
ALBUMIN SERPL ELPH-MCNC: 4.3 G/DL
ALP BLD-CCNC: 124 U/L
ALT SERPL-CCNC: 10 U/L
ANION GAP SERPL CALC-SCNC: 14 MMOL/L
AST SERPL-CCNC: 20 U/L
BILIRUB SERPL-MCNC: 0.3 MG/DL
BUN SERPL-MCNC: 32 MG/DL
CALCIUM SERPL-MCNC: 9.4 MG/DL
CHLORIDE SERPL-SCNC: 107 MMOL/L
CHOLEST SERPL-MCNC: 108 MG/DL
CO2 SERPL-SCNC: 22 MMOL/L
CREAT SERPL-MCNC: 1.52 MG/DL
EGFR: 48 ML/MIN/1.73M2
ESTIMATED AVERAGE GLUCOSE: 94 MG/DL
GLUCOSE SERPL-MCNC: 99 MG/DL
HBA1C MFR BLD HPLC: 4.9 %
HDLC SERPL-MCNC: 36 MG/DL
LDLC SERPL CALC-MCNC: 55 MG/DL
NONHDLC SERPL-MCNC: 73 MG/DL
POTASSIUM SERPL-SCNC: 5.2 MMOL/L
PROT SERPL-MCNC: 7.2 G/DL
SODIUM SERPL-SCNC: 143 MMOL/L
TRIGL SERPL-MCNC: 87 MG/DL
TSH SERPL-ACNC: 1.55 UIU/ML

## 2024-02-10 ENCOUNTER — RESULT REVIEW (OUTPATIENT)
Age: 74
End: 2024-02-10

## 2024-02-10 ENCOUNTER — APPOINTMENT (OUTPATIENT)
Dept: HEMATOLOGY ONCOLOGY | Facility: CLINIC | Age: 74
End: 2024-02-10

## 2024-02-10 ENCOUNTER — APPOINTMENT (OUTPATIENT)
Dept: INFUSION THERAPY | Facility: HOSPITAL | Age: 74
End: 2024-02-10

## 2024-02-10 LAB
BASOPHILS # BLD AUTO: 0.05 K/UL — SIGNIFICANT CHANGE UP (ref 0–0.2)
BASOPHILS NFR BLD AUTO: 1.4 % — SIGNIFICANT CHANGE UP (ref 0–2)
EOSINOPHIL # BLD AUTO: 0.29 K/UL — SIGNIFICANT CHANGE UP (ref 0–0.5)
EOSINOPHIL NFR BLD AUTO: 8.4 % — HIGH (ref 0–6)
HCT VFR BLD CALC: 33.7 % — LOW (ref 39–50)
HGB BLD-MCNC: 11.3 G/DL — LOW (ref 13–17)
IMM GRANULOCYTES NFR BLD AUTO: 0.3 % — SIGNIFICANT CHANGE UP (ref 0–0.9)
LYMPHOCYTES # BLD AUTO: 0.42 K/UL — LOW (ref 1–3.3)
LYMPHOCYTES # BLD AUTO: 12.1 % — LOW (ref 13–44)
MCHC RBC-ENTMCNC: 31.3 PG — SIGNIFICANT CHANGE UP (ref 27–34)
MCHC RBC-ENTMCNC: 33.5 G/DL — SIGNIFICANT CHANGE UP (ref 32–36)
MCV RBC AUTO: 93.4 FL — SIGNIFICANT CHANGE UP (ref 80–100)
MONOCYTES # BLD AUTO: 0.43 K/UL — SIGNIFICANT CHANGE UP (ref 0–0.9)
MONOCYTES NFR BLD AUTO: 12.4 % — SIGNIFICANT CHANGE UP (ref 2–14)
NEUTROPHILS # BLD AUTO: 2.27 K/UL — SIGNIFICANT CHANGE UP (ref 1.8–7.4)
NEUTROPHILS NFR BLD AUTO: 65.4 % — SIGNIFICANT CHANGE UP (ref 43–77)
NRBC # BLD: 0 /100 WBCS — SIGNIFICANT CHANGE UP (ref 0–0)
PLATELET # BLD AUTO: 111 K/UL — LOW (ref 150–400)
RBC # BLD: 3.61 M/UL — LOW (ref 4.2–5.8)
RBC # FLD: 14.4 % — SIGNIFICANT CHANGE UP (ref 10.3–14.5)
WBC # BLD: 3.47 K/UL — LOW (ref 3.8–10.5)
WBC # FLD AUTO: 3.47 K/UL — LOW (ref 3.8–10.5)

## 2024-02-13 NOTE — PROGRESS NOTE ADULT - PROBLEM SELECTOR PLAN 11
No abnormalities No abnormalities No abnormalities - hold all anti-htn i/s/o hypotension No abnormalities No abnormalities No abnormalities - currently on Hydralazine as above  - introducing GDMT as tolerated as above No abnormalities No abnormalities No abnormalities - fsg q6, pending A1c, ISS No abnormalities No abnormalities No abnormalities No abnormalities No abnormalities No abnormalities No abnormalities No abnormalities No abnormalities

## 2024-02-24 ENCOUNTER — RESULT REVIEW (OUTPATIENT)
Age: 74
End: 2024-02-24

## 2024-02-24 ENCOUNTER — APPOINTMENT (OUTPATIENT)
Dept: HEMATOLOGY ONCOLOGY | Facility: CLINIC | Age: 74
End: 2024-02-24

## 2024-02-24 ENCOUNTER — APPOINTMENT (OUTPATIENT)
Dept: INFUSION THERAPY | Facility: HOSPITAL | Age: 74
End: 2024-02-24

## 2024-02-24 LAB
ALBUMIN SERPL ELPH-MCNC: 4.2 G/DL — SIGNIFICANT CHANGE UP (ref 3.3–5)
ALP SERPL-CCNC: 124 U/L — HIGH (ref 40–120)
ALT FLD-CCNC: 12 U/L — SIGNIFICANT CHANGE UP (ref 10–45)
ANION GAP SERPL CALC-SCNC: 14 MMOL/L — SIGNIFICANT CHANGE UP (ref 5–17)
AST SERPL-CCNC: 16 U/L — SIGNIFICANT CHANGE UP (ref 10–40)
BASOPHILS # BLD AUTO: 0.06 K/UL — SIGNIFICANT CHANGE UP (ref 0–0.2)
BASOPHILS NFR BLD AUTO: 1 % — SIGNIFICANT CHANGE UP (ref 0–2)
BILIRUB SERPL-MCNC: 0.3 MG/DL — SIGNIFICANT CHANGE UP (ref 0.2–1.2)
BUN SERPL-MCNC: 35 MG/DL — HIGH (ref 7–23)
CALCIUM SERPL-MCNC: 9.2 MG/DL — SIGNIFICANT CHANGE UP (ref 8.4–10.5)
CHLORIDE SERPL-SCNC: 108 MMOL/L — SIGNIFICANT CHANGE UP (ref 96–108)
CO2 SERPL-SCNC: 24 MMOL/L — SIGNIFICANT CHANGE UP (ref 22–31)
CREAT SERPL-MCNC: 1.57 MG/DL — HIGH (ref 0.5–1.3)
EGFR: 47 ML/MIN/1.73M2 — LOW
EOSINOPHIL # BLD AUTO: 0.44 K/UL — SIGNIFICANT CHANGE UP (ref 0–0.5)
EOSINOPHIL NFR BLD AUTO: 7 % — HIGH (ref 0–6)
GLUCOSE SERPL-MCNC: 95 MG/DL — SIGNIFICANT CHANGE UP (ref 70–99)
HCT VFR BLD CALC: 37.7 % — LOW (ref 39–50)
HGB BLD-MCNC: 12.8 G/DL — LOW (ref 13–17)
IMM GRANULOCYTES NFR BLD AUTO: 1.1 % — HIGH (ref 0–0.9)
LDH SERPL L TO P-CCNC: 258 U/L — HIGH (ref 50–242)
LDH SERPL L TO P-CCNC: 272 U/L — HIGH (ref 50–242)
LYMPHOCYTES # BLD AUTO: 0.66 K/UL — LOW (ref 1–3.3)
LYMPHOCYTES # BLD AUTO: 10.5 % — LOW (ref 13–44)
MAGNESIUM SERPL-MCNC: 1.8 MG/DL — SIGNIFICANT CHANGE UP (ref 1.6–2.6)
MAGNESIUM SERPL-MCNC: 1.8 MG/DL — SIGNIFICANT CHANGE UP (ref 1.6–2.6)
MCHC RBC-ENTMCNC: 31.4 PG — SIGNIFICANT CHANGE UP (ref 27–34)
MCHC RBC-ENTMCNC: 34 G/DL — SIGNIFICANT CHANGE UP (ref 32–36)
MCV RBC AUTO: 92.6 FL — SIGNIFICANT CHANGE UP (ref 80–100)
MONOCYTES # BLD AUTO: 0.51 K/UL — SIGNIFICANT CHANGE UP (ref 0–0.9)
MONOCYTES NFR BLD AUTO: 8.1 % — SIGNIFICANT CHANGE UP (ref 2–14)
NEUTROPHILS # BLD AUTO: 4.53 K/UL — SIGNIFICANT CHANGE UP (ref 1.8–7.4)
NEUTROPHILS NFR BLD AUTO: 72.3 % — SIGNIFICANT CHANGE UP (ref 43–77)
NRBC # BLD: 0 /100 WBCS — SIGNIFICANT CHANGE UP (ref 0–0)
PHOSPHATE SERPL-MCNC: 3.3 MG/DL — SIGNIFICANT CHANGE UP (ref 2.5–4.5)
PHOSPHATE SERPL-MCNC: 3.3 MG/DL — SIGNIFICANT CHANGE UP (ref 2.5–4.5)
PLATELET # BLD AUTO: 104 K/UL — LOW (ref 150–400)
POTASSIUM SERPL-MCNC: 3.9 MMOL/L — SIGNIFICANT CHANGE UP (ref 3.5–5.3)
POTASSIUM SERPL-SCNC: 3.9 MMOL/L — SIGNIFICANT CHANGE UP (ref 3.5–5.3)
PROT SERPL-MCNC: 7.3 G/DL — SIGNIFICANT CHANGE UP (ref 6–8.3)
RBC # BLD: 4.07 M/UL — LOW (ref 4.2–5.8)
RBC # FLD: 14.4 % — SIGNIFICANT CHANGE UP (ref 10.3–14.5)
SODIUM SERPL-SCNC: 145 MMOL/L — SIGNIFICANT CHANGE UP (ref 135–145)
URATE SERPL-MCNC: 8.5 MG/DL — SIGNIFICANT CHANGE UP (ref 3.4–8.8)
URATE SERPL-MCNC: 8.6 MG/DL — SIGNIFICANT CHANGE UP (ref 3.4–8.8)
WBC # BLD: 6.27 K/UL — SIGNIFICANT CHANGE UP (ref 3.8–10.5)
WBC # FLD AUTO: 6.27 K/UL — SIGNIFICANT CHANGE UP (ref 3.8–10.5)

## 2024-02-28 ENCOUNTER — OUTPATIENT (OUTPATIENT)
Dept: OUTPATIENT SERVICES | Facility: HOSPITAL | Age: 74
LOS: 1 days | Discharge: HOME | End: 2024-02-28

## 2024-02-28 DIAGNOSIS — Z95.0 PRESENCE OF CARDIAC PACEMAKER: Chronic | ICD-10-CM

## 2024-02-28 DIAGNOSIS — C85.90 NON-HODGKIN LYMPHOMA, UNSPECIFIED, UNSPECIFIED SITE: Chronic | ICD-10-CM

## 2024-02-28 DIAGNOSIS — C82.20 FOLLICULAR LYMPHOMA GRADE III, UNSPECIFIED, UNSPECIFIED SITE: ICD-10-CM

## 2024-03-05 ENCOUNTER — APPOINTMENT (OUTPATIENT)
Dept: NUCLEAR MEDICINE | Facility: CLINIC | Age: 74
End: 2024-03-05
Payer: MEDICARE

## 2024-03-05 ENCOUNTER — APPOINTMENT (OUTPATIENT)
Dept: ELECTROPHYSIOLOGY | Facility: CLINIC | Age: 74
End: 2024-03-05
Payer: MEDICARE

## 2024-03-05 ENCOUNTER — NON-APPOINTMENT (OUTPATIENT)
Age: 74
End: 2024-03-05

## 2024-03-05 PROCEDURE — A9552: CPT

## 2024-03-05 PROCEDURE — 78815 PET IMAGE W/CT SKULL-THIGH: CPT | Mod: PS

## 2024-03-05 PROCEDURE — 93296 REM INTERROG EVL PM/IDS: CPT

## 2024-03-05 PROCEDURE — 93294 REM INTERROG EVL PM/LDLS PM: CPT

## 2024-03-09 ENCOUNTER — RESULT REVIEW (OUTPATIENT)
Age: 74
End: 2024-03-09

## 2024-03-09 ENCOUNTER — APPOINTMENT (OUTPATIENT)
Dept: HEMATOLOGY ONCOLOGY | Facility: CLINIC | Age: 74
End: 2024-03-09

## 2024-03-09 ENCOUNTER — APPOINTMENT (OUTPATIENT)
Dept: INFUSION THERAPY | Facility: HOSPITAL | Age: 74
End: 2024-03-09

## 2024-03-09 LAB
BASOPHILS # BLD AUTO: 0.06 K/UL — SIGNIFICANT CHANGE UP (ref 0–0.2)
BASOPHILS NFR BLD AUTO: 1 % — SIGNIFICANT CHANGE UP (ref 0–2)
EOSINOPHIL # BLD AUTO: 0.32 K/UL — SIGNIFICANT CHANGE UP (ref 0–0.5)
EOSINOPHIL NFR BLD AUTO: 5.6 % — SIGNIFICANT CHANGE UP (ref 0–6)
HCT VFR BLD CALC: 38.6 % — LOW (ref 39–50)
HGB BLD-MCNC: 12.9 G/DL — LOW (ref 13–17)
IMM GRANULOCYTES NFR BLD AUTO: 1.7 % — HIGH (ref 0–0.9)
LYMPHOCYTES # BLD AUTO: 0.5 K/UL — LOW (ref 1–3.3)
LYMPHOCYTES # BLD AUTO: 8.7 % — LOW (ref 13–44)
MCHC RBC-ENTMCNC: 31 PG — SIGNIFICANT CHANGE UP (ref 27–34)
MCHC RBC-ENTMCNC: 33.4 G/DL — SIGNIFICANT CHANGE UP (ref 32–36)
MCV RBC AUTO: 92.8 FL — SIGNIFICANT CHANGE UP (ref 80–100)
MONOCYTES # BLD AUTO: 0.43 K/UL — SIGNIFICANT CHANGE UP (ref 0–0.9)
MONOCYTES NFR BLD AUTO: 7.5 % — SIGNIFICANT CHANGE UP (ref 2–14)
NEUTROPHILS # BLD AUTO: 4.35 K/UL — SIGNIFICANT CHANGE UP (ref 1.8–7.4)
NEUTROPHILS NFR BLD AUTO: 75.5 % — SIGNIFICANT CHANGE UP (ref 43–77)
NRBC # BLD: 0 /100 WBCS — SIGNIFICANT CHANGE UP (ref 0–0)
PLATELET # BLD AUTO: 105 K/UL — LOW (ref 150–400)
RBC # BLD: 4.16 M/UL — LOW (ref 4.2–5.8)
RBC # FLD: 13.9 % — SIGNIFICANT CHANGE UP (ref 10.3–14.5)
WBC # BLD: 5.76 K/UL — SIGNIFICANT CHANGE UP (ref 3.8–10.5)
WBC # FLD AUTO: 5.76 K/UL — SIGNIFICANT CHANGE UP (ref 3.8–10.5)

## 2024-03-11 ENCOUNTER — RX RENEWAL (OUTPATIENT)
Age: 74
End: 2024-03-11

## 2024-03-11 RX ORDER — ERGOCALCIFEROL 1.25 MG/1
1.25 MG CAPSULE, LIQUID FILLED ORAL
Qty: 12 | Refills: 0 | Status: ACTIVE | COMMUNITY
Start: 2024-02-09 | End: 1900-01-01

## 2024-03-12 DIAGNOSIS — Z51.11 ENCOUNTER FOR ANTINEOPLASTIC CHEMOTHERAPY: ICD-10-CM

## 2024-03-12 DIAGNOSIS — R11.2 NAUSEA WITH VOMITING, UNSPECIFIED: ICD-10-CM

## 2024-03-22 ENCOUNTER — OFFICE (OUTPATIENT)
Dept: URBAN - METROPOLITAN AREA CLINIC 90 | Facility: CLINIC | Age: 74
Setting detail: OPHTHALMOLOGY
End: 2024-03-22
Payer: MEDICARE

## 2024-03-22 DIAGNOSIS — H01.00B: ICD-10-CM

## 2024-03-22 DIAGNOSIS — H25.13: ICD-10-CM

## 2024-03-22 DIAGNOSIS — H35.362: ICD-10-CM

## 2024-03-22 DIAGNOSIS — H01.00A: ICD-10-CM

## 2024-03-22 PROBLEM — H01.005 BLEPHARITIS; RIGHT UPPER LID, RIGHT LOWER LID, LEFT UPPER LID, LEFT LOWER LID: Status: ACTIVE | Noted: 2024-03-22

## 2024-03-22 PROBLEM — H01.001 BLEPHARITIS; RIGHT UPPER LID, RIGHT LOWER LID, LEFT UPPER LID, LEFT LOWER LID: Status: ACTIVE | Noted: 2024-03-22

## 2024-03-22 PROBLEM — H01.002 BLEPHARITIS; RIGHT UPPER LID, RIGHT LOWER LID, LEFT UPPER LID, LEFT LOWER LID: Status: ACTIVE | Noted: 2024-03-22

## 2024-03-22 PROBLEM — H01.004 BLEPHARITIS; RIGHT UPPER LID, RIGHT LOWER LID, LEFT UPPER LID, LEFT LOWER LID: Status: ACTIVE | Noted: 2024-03-22

## 2024-03-22 PROCEDURE — 92250 FUNDUS PHOTOGRAPHY W/I&R: CPT | Performed by: OPHTHALMOLOGY

## 2024-03-22 PROCEDURE — 92004 COMPRE OPH EXAM NEW PT 1/>: CPT | Performed by: OPHTHALMOLOGY

## 2024-03-22 ASSESSMENT — LID EXAM ASSESSMENTS
OS_BLEPHARITIS: LLL LUL 2+
OD_BLEPHARITIS: RLL RUL 2+

## 2024-03-23 ENCOUNTER — RESULT REVIEW (OUTPATIENT)
Age: 74
End: 2024-03-23

## 2024-03-23 ENCOUNTER — APPOINTMENT (OUTPATIENT)
Dept: HEMATOLOGY ONCOLOGY | Facility: CLINIC | Age: 74
End: 2024-03-23

## 2024-03-23 ENCOUNTER — APPOINTMENT (OUTPATIENT)
Dept: INFUSION THERAPY | Facility: HOSPITAL | Age: 74
End: 2024-03-23

## 2024-03-23 LAB
BASOPHILS # BLD AUTO: 0.02 K/UL — SIGNIFICANT CHANGE UP (ref 0–0.2)
BASOPHILS NFR BLD AUTO: 0.5 % — SIGNIFICANT CHANGE UP (ref 0–2)
EOSINOPHIL # BLD AUTO: 0.2 K/UL — SIGNIFICANT CHANGE UP (ref 0–0.5)
EOSINOPHIL NFR BLD AUTO: 4.6 % — SIGNIFICANT CHANGE UP (ref 0–6)
HCT VFR BLD CALC: 35.7 % — LOW (ref 39–50)
HGB BLD-MCNC: 12.5 G/DL — LOW (ref 13–17)
IMM GRANULOCYTES NFR BLD AUTO: 1.6 % — HIGH (ref 0–0.9)
LYMPHOCYTES # BLD AUTO: 0.6 K/UL — LOW (ref 1–3.3)
LYMPHOCYTES # BLD AUTO: 13.9 % — SIGNIFICANT CHANGE UP (ref 13–44)
MCHC RBC-ENTMCNC: 32.4 PG — SIGNIFICANT CHANGE UP (ref 27–34)
MCHC RBC-ENTMCNC: 35 G/DL — SIGNIFICANT CHANGE UP (ref 32–36)
MCV RBC AUTO: 92.5 FL — SIGNIFICANT CHANGE UP (ref 80–100)
MONOCYTES # BLD AUTO: 0.48 K/UL — SIGNIFICANT CHANGE UP (ref 0–0.9)
MONOCYTES NFR BLD AUTO: 11.1 % — SIGNIFICANT CHANGE UP (ref 2–14)
NEUTROPHILS # BLD AUTO: 2.95 K/UL — SIGNIFICANT CHANGE UP (ref 1.8–7.4)
NEUTROPHILS NFR BLD AUTO: 68.3 % — SIGNIFICANT CHANGE UP (ref 43–77)
NRBC # BLD: 0 /100 WBCS — SIGNIFICANT CHANGE UP (ref 0–0)
PLATELET # BLD AUTO: 91 K/UL — LOW (ref 150–400)
RBC # BLD: 3.86 M/UL — LOW (ref 4.2–5.8)
RBC # FLD: 14.5 % — SIGNIFICANT CHANGE UP (ref 10.3–14.5)
WBC # BLD: 4.32 K/UL — SIGNIFICANT CHANGE UP (ref 3.8–10.5)
WBC # FLD AUTO: 4.32 K/UL — SIGNIFICANT CHANGE UP (ref 3.8–10.5)

## 2024-03-24 LAB
ALBUMIN SERPL ELPH-MCNC: 4.4 G/DL — SIGNIFICANT CHANGE UP (ref 3.3–5)
ALP SERPL-CCNC: 120 U/L — SIGNIFICANT CHANGE UP (ref 40–120)
ALT FLD-CCNC: 23 U/L — SIGNIFICANT CHANGE UP (ref 10–45)
ANION GAP SERPL CALC-SCNC: 14 MMOL/L — SIGNIFICANT CHANGE UP (ref 5–17)
AST SERPL-CCNC: 23 U/L — SIGNIFICANT CHANGE UP (ref 10–40)
BILIRUB SERPL-MCNC: 0.6 MG/DL — SIGNIFICANT CHANGE UP (ref 0.2–1.2)
BUN SERPL-MCNC: 26 MG/DL — HIGH (ref 7–23)
CALCIUM SERPL-MCNC: 8.9 MG/DL — SIGNIFICANT CHANGE UP (ref 8.4–10.5)
CHLORIDE SERPL-SCNC: 107 MMOL/L — SIGNIFICANT CHANGE UP (ref 96–108)
CO2 SERPL-SCNC: 25 MMOL/L — SIGNIFICANT CHANGE UP (ref 22–31)
CREAT SERPL-MCNC: 1.24 MG/DL — SIGNIFICANT CHANGE UP (ref 0.5–1.3)
EGFR: 62 ML/MIN/1.73M2 — SIGNIFICANT CHANGE UP
GLUCOSE SERPL-MCNC: 98 MG/DL — SIGNIFICANT CHANGE UP (ref 70–99)
LDH SERPL L TO P-CCNC: 227 U/L — SIGNIFICANT CHANGE UP (ref 50–242)
MAGNESIUM SERPL-MCNC: 1.7 MG/DL — SIGNIFICANT CHANGE UP (ref 1.6–2.6)
PHOSPHATE SERPL-MCNC: 3.2 MG/DL — SIGNIFICANT CHANGE UP (ref 2.5–4.5)
POTASSIUM SERPL-MCNC: 3.7 MMOL/L — SIGNIFICANT CHANGE UP (ref 3.5–5.3)
POTASSIUM SERPL-SCNC: 3.7 MMOL/L — SIGNIFICANT CHANGE UP (ref 3.5–5.3)
PROT SERPL-MCNC: 6.8 G/DL — SIGNIFICANT CHANGE UP (ref 6–8.3)
SODIUM SERPL-SCNC: 145 MMOL/L — SIGNIFICANT CHANGE UP (ref 135–145)
URATE SERPL-MCNC: 7.7 MG/DL — SIGNIFICANT CHANGE UP (ref 3.4–8.8)

## 2024-03-25 ENCOUNTER — NON-APPOINTMENT (OUTPATIENT)
Age: 74
End: 2024-03-25

## 2024-04-01 ASSESSMENT — REFRACTION_CURRENTRX
OD_SPHERE: +2.75
OS_SPHERE: +2.75
OS_CYLINDER: SPH
OS_OVR_VA: 20/
OD_CYLINDER: SPH
OD_VPRISM_DIRECTION: SV
OD_OVR_VA: 20/
OS_VPRISM_DIRECTION: SV

## 2024-04-01 ASSESSMENT — KERATOMETRY
OD_K1POWER_DIOPTERS: 44.25
OD_AXISANGLE_DEGREES: 074
METHOD_AUTO_MANUAL: AUTO
OS_K2POWER_DIOPTERS: 44.50
OD_K2POWER_DIOPTERS: 43.75
OS_AXISANGLE_DEGREES: 037
OS_K1POWER_DIOPTERS: 43.50

## 2024-04-06 ENCOUNTER — RESULT REVIEW (OUTPATIENT)
Age: 74
End: 2024-04-06

## 2024-04-06 ENCOUNTER — NON-APPOINTMENT (OUTPATIENT)
Age: 74
End: 2024-04-06

## 2024-04-06 ENCOUNTER — APPOINTMENT (OUTPATIENT)
Dept: INFUSION THERAPY | Facility: HOSPITAL | Age: 74
End: 2024-04-06

## 2024-04-06 ENCOUNTER — APPOINTMENT (OUTPATIENT)
Dept: HEMATOLOGY ONCOLOGY | Facility: CLINIC | Age: 74
End: 2024-04-06

## 2024-04-06 LAB
BASOPHILS # BLD AUTO: 0.09 K/UL — SIGNIFICANT CHANGE UP (ref 0–0.2)
BASOPHILS NFR BLD AUTO: 1.9 % — SIGNIFICANT CHANGE UP (ref 0–2)
EOSINOPHIL # BLD AUTO: 0.32 K/UL — SIGNIFICANT CHANGE UP (ref 0–0.5)
EOSINOPHIL NFR BLD AUTO: 6.6 % — HIGH (ref 0–6)
HCT VFR BLD CALC: 37.9 % — LOW (ref 39–50)
HGB BLD-MCNC: 12.9 G/DL — LOW (ref 13–17)
IMM GRANULOCYTES NFR BLD AUTO: 3.5 % — HIGH (ref 0–0.9)
LYMPHOCYTES # BLD AUTO: 0.95 K/UL — LOW (ref 1–3.3)
LYMPHOCYTES # BLD AUTO: 19.5 % — SIGNIFICANT CHANGE UP (ref 13–44)
MCHC RBC-ENTMCNC: 31.8 PG — SIGNIFICANT CHANGE UP (ref 27–34)
MCHC RBC-ENTMCNC: 34 G/DL — SIGNIFICANT CHANGE UP (ref 32–36)
MCV RBC AUTO: 93.3 FL — SIGNIFICANT CHANGE UP (ref 80–100)
MONOCYTES # BLD AUTO: 0.73 K/UL — SIGNIFICANT CHANGE UP (ref 0–0.9)
MONOCYTES NFR BLD AUTO: 15 % — HIGH (ref 2–14)
NEUTROPHILS # BLD AUTO: 2.6 K/UL — SIGNIFICANT CHANGE UP (ref 1.8–7.4)
NEUTROPHILS NFR BLD AUTO: 53.5 % — SIGNIFICANT CHANGE UP (ref 43–77)
NRBC # BLD: 0 /100 WBCS — SIGNIFICANT CHANGE UP (ref 0–0)
PLATELET # BLD AUTO: 101 K/UL — LOW (ref 150–400)
RBC # BLD: 4.06 M/UL — LOW (ref 4.2–5.8)
RBC # FLD: 14 % — SIGNIFICANT CHANGE UP (ref 10.3–14.5)
WBC # BLD: 4.86 K/UL — SIGNIFICANT CHANGE UP (ref 3.8–10.5)
WBC # FLD AUTO: 4.86 K/UL — SIGNIFICANT CHANGE UP (ref 3.8–10.5)

## 2024-04-07 LAB
ALBUMIN SERPL ELPH-MCNC: 4.1 G/DL — SIGNIFICANT CHANGE UP (ref 3.3–5)
ALP SERPL-CCNC: 120 U/L — SIGNIFICANT CHANGE UP (ref 40–120)
ALT FLD-CCNC: 19 U/L — SIGNIFICANT CHANGE UP (ref 10–45)
ANION GAP SERPL CALC-SCNC: 14 MMOL/L — SIGNIFICANT CHANGE UP (ref 5–17)
AST SERPL-CCNC: 23 U/L — SIGNIFICANT CHANGE UP (ref 10–40)
BILIRUB SERPL-MCNC: 0.4 MG/DL — SIGNIFICANT CHANGE UP (ref 0.2–1.2)
BUN SERPL-MCNC: 23 MG/DL — SIGNIFICANT CHANGE UP (ref 7–23)
CALCIUM SERPL-MCNC: 8.9 MG/DL — SIGNIFICANT CHANGE UP (ref 8.4–10.5)
CHLORIDE SERPL-SCNC: 106 MMOL/L — SIGNIFICANT CHANGE UP (ref 96–108)
CO2 SERPL-SCNC: 25 MMOL/L — SIGNIFICANT CHANGE UP (ref 22–31)
CREAT SERPL-MCNC: 1.38 MG/DL — HIGH (ref 0.5–1.3)
EGFR: 54 ML/MIN/1.73M2 — LOW
GLUCOSE SERPL-MCNC: 85 MG/DL — SIGNIFICANT CHANGE UP (ref 70–99)
LDH SERPL L TO P-CCNC: 306 U/L — HIGH (ref 50–242)
MAGNESIUM SERPL-MCNC: 1.8 MG/DL — SIGNIFICANT CHANGE UP (ref 1.6–2.6)
PHOSPHATE SERPL-MCNC: 2.6 MG/DL — SIGNIFICANT CHANGE UP (ref 2.5–4.5)
POTASSIUM SERPL-MCNC: 3.7 MMOL/L — SIGNIFICANT CHANGE UP (ref 3.5–5.3)
POTASSIUM SERPL-SCNC: 3.7 MMOL/L — SIGNIFICANT CHANGE UP (ref 3.5–5.3)
PROT SERPL-MCNC: 7 G/DL — SIGNIFICANT CHANGE UP (ref 6–8.3)
SODIUM SERPL-SCNC: 145 MMOL/L — SIGNIFICANT CHANGE UP (ref 135–145)
URATE SERPL-MCNC: 6.6 MG/DL — SIGNIFICANT CHANGE UP (ref 3.4–8.8)

## 2024-04-16 ENCOUNTER — RX RENEWAL (OUTPATIENT)
Age: 74
End: 2024-04-16

## 2024-04-18 ENCOUNTER — APPOINTMENT (OUTPATIENT)
Dept: HEMATOLOGY ONCOLOGY | Facility: CLINIC | Age: 74
End: 2024-04-18
Payer: MEDICARE

## 2024-04-18 VITALS
OXYGEN SATURATION: 99 % | TEMPERATURE: 97.5 F | BODY MASS INDEX: 22.78 KG/M2 | SYSTOLIC BLOOD PRESSURE: 150 MMHG | RESPIRATION RATE: 16 BRPM | WEIGHT: 163.36 LBS | DIASTOLIC BLOOD PRESSURE: 71 MMHG | HEART RATE: 73 BPM

## 2024-04-18 DIAGNOSIS — Z51.11 ENCOUNTER FOR ANTINEOPLASTIC CHEMOTHERAPY: ICD-10-CM

## 2024-04-18 PROCEDURE — 99214 OFFICE O/P EST MOD 30 MIN: CPT

## 2024-04-18 NOTE — ASSESSMENT
[FreeTextEntry1] : 72 yo male with PMHx significant for follicular lymphoma with DLBCL (tx with R-CHOP in 2003, with relapse in 2009, treated with BR) with his initial presentation at [facility] with multiple areas of palpable lymphadenopathy with follicular lymphoma grade IIIA (IPI score 3) in the setting of a steady decline in weight > 20 lbs in the previous 6 months without other constitutional complaints. 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  Due to symptoms / clinical picture, he was started on therapy. He is s/p 3 cycles of obinutuzumab + mini-CHOP, however this was d/c'd due to intolerability / complications. He is now on second-line therapy with Tafasitamab (anti-CD19) + lenalidomide (15 mg 21/28 days) since 3/30/23. Due to severe neutropenia he had mu;tiple treatment delays (discussed above in Treatment Hx). Today Cycle 11 Day 27 of Tafasitamab + lenalidomide.  Plan: - Repeat Lymphoma labs once every 4 weeks during treatment visit, during physician visit he only needs Pre-F (fingerstick for CBC) - Continue Tafasitamab 12 mg/kg every 2 weeks up until progression, will re-assess in 6 months. Next treatment Cycle 12 Day 1 this Saturday. HOLD if ANC <500. - PET/CT done on 3/5/24 to assess response to Tafa + Rev; findings show [place] 2 response therefore will continue with current treatment plan. - VTE ppx while on Revlimid; on Apixaban for cardiac issues, Continue 5 mg q12hrs. No bleeding or bruising issues. - Continue Allopurinol 100 mg daily given hx of TLS and hyperuricemia, has G6PD deficiency. UA is usually in the range 6-8., continue to check once a month Uric acid levels. - Antiviral ppx: Acyclovir 400 mg BID - Behavioral Health Issues: Has been improving. Established care with Dr Childers (neuro-onc). No neurologic or other active issues identified. - HCM: HTN, takes metoprolol succinate 25 mg once a day, has possible white coat syndrome, gets better over the course of the visit. - Follow up monthly or sooner as needed for any issue  Case and management discussed with Dr. Cordova ___ I personally have spent a total of 40 minutes of time on the date of this encounter reviewing test results, documenting findings, providing education, coordinating care and directly consulting with the patient and/or designated family member.

## 2024-04-18 NOTE — HISTORY OF PRESENT ILLNESS
[de-identified] : Initial Presentation:  71 yo with MHx significant for Atrial flutter (on Apixaban), HTN, here for further evaluation of low-level neutrophilia (since 1/2022) and lymphadenopathy. Previously NHL (around 2539-8860?). He received chemotherapy in 2004. 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 In 2010 he went to [place] and was treated for reoccurrence and was treated with bendamustine. He reports that he has had areas of swelling in his neck on and off for about 2 years which was mostly undergoing workup with his endocrinologist. In the last few months he has noticed increasing size of his left cervical LNs and a right nodule that caused swelling of his face, which has now spontaneously decreased in size significantly.  Today he reports he feels well outside of weight loss. He denies any other constitutional complaints. He weighed 192 lbs in 10/2021 which was down to 183 lbs in December 12/2021(2 months later) which has further decreased down to 179 lbs today. He has not felt himself masses outside of his neck region. On 5/14/22, he went for US duplex of his left lower extremity due to a "tangerine size lump" on the back of his left thigh, which noted a 4.4 cm hypoechoic mass in his left inguinal region without commenting on his perceived posterior thigh mass. Also, on 5/2/22 he underwent US of his thyroid and parathyroid glands where they noticed bilateral cervical lymph nodes with the largest on the left side measuring 2.1 x 1.6 x 1.9 cm and a right level 1 LN measuring 2.2 x 1.2 x 2.3 cm. They saw 3 lymph nodes on the left side and one discrete LN on the right.   He also recently just finished a 14 day course of Levofloxacin for an uncomplicated phenomena. He was having a dry cough and underwent imaging as an outpatient which found mild left and right pleural effusions, areas of consolidation on the right and retrocardiac airspace involvement (performed 5/2/22). He now feels better without infectious complaints.  In addition, he is currently anemic. He last had a colonoscopy in his recent memory. He had bleeding hemorrhoids last year treated with OTC medications. He denies any tick bites recently.   He also has MGUS with 3 separate monoclonal gammopathies I suspect is related to his NHL. He has M Judd 1 showing IgG Lambda at 0.3 g/dl, M Judd 2 showing IgG Kappa at 0.2 g/dl, M Judd 3 showing IgM Lambda (unable to quantitate). There is no suspicion for myeloma or waldenstroms at this time and his M-spikes will be monitored. He has no elevation in kappa/ lambda FLC ration, but individual light chains are both elevated, kappa at 10.77 mg/dL and lambda at 6.58 mg/dL.  Social Hx - He is currently retired. He used to be an . - No significant environmental exposure to chemicals - Lives by himself and his wife lives in [place]. - Former smoker. He smoked for 10 years less than 1 pack a day. He quit 20 years ago  Family hx - Two brothers non Hodgkins lymphoma. At least one required chemotherapy. sister had cervical cancer first diagnosed 2007 and then 3 years later with more cancer discovered - Mother and father passed away from heart disease and diabetes.  ======================================================= Interval Hx update:  He underwent PET-CT in Aug 2022 which showed FDG avid lymph nodes in the left cervical, bilateral supraclavicular regions, and chest, and a few FDG avid abdominal lymph nodes, intramuscular masses in the left posterior chest and left upper thigh, scattered FDG avid subcutaneous soft tissue/nodules with trace right pleural effusion, moderate left pleural effusion, loculated fluid in the right middle lobe. Moderate abdominal and pelvic ascites. Subcutaneous edema in the lower abdominal wall extending into the imaged bilateral thighs. Splenomegaly with mild FDG avidity, suspicious for lymphomatous involvement.  Prior to the PET-CT, he underwent biopsy of both a cervical lymph node and a left axilla lymph node. 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 There were areas of > 20 SUV on the PET-CT, repeat whole lymph node biopsy was requested to confirm suspected diagnosis of Grade 3B FL or transformed large cell lymphoma. S/p excisional biopsy of back lesion on 9/7/22 which showed recurrent DLBCL.  LP 9/26/22: protein elevated at 61, LDH 27, neutrophils 0, lymphocytes 33, monocytes 67, RBC 5. Oligoclonal bands negative. The flow cytometry failed due to insufficient cells. Recommended continued IT MTX therapy x 4 total cycles. He had an interim PET-CT performed on 2/1/23 (3 months after discontinuation of O-miniCHOP in the middle of his therapy - received 3/6 cycles) which showed possible persistent disease including a small, residual focus of increased FDG activity in the left level II region, likely corresponding to a difficult to delineate lymph node, is decreased in size and metabolism (SUV 3.5; image 33; previous SUV 16.1 and skin thickening and subcutaneous nodules, right lower anterior and lateral abdominal wall, slightly more extensive and similar in metabolism (SUV 7.1; image 170; previous SUV 5.5). Discontinued chemoimmunotherapy with Obi-miniCHOP in Nov 2022 due to treatment complications, hospitalizations, poor PS. He was subsequently started on Tafasitamab (anti-CD19) + lenalidomide since 3/30/23. Lenalidomide dose reduced from 20 mg to 15 mg due to episodes of severe neutropenia.  ======================================================= Care Providers:  PMD/ Geriatrician: Dr Allison; Tel: [phone] Endo: Dr Carter Vigil Cardiologist: Dr. Don [phone] fax [phone]  ======================================================= Contacts:  Vonnie (daughter): [phone] - takes all healthcare related calls  ======================================================= [de-identified] : PENDING [de-identified] : Fine Needle Aspiration Report - Auth (Verified)\par  Specimen(s) Submitted\par  1. AXILLA, LEFT, US GUIDED CORE BIOPSY AND FNA\par  2. LYMPH NODE, CERVICAL, LEFT POSTERIOR, US GUIDED CORE BIOPSY AND FNA\par  \par  \par  Final Diagnosis\par  1. AXILLA, LEFT, US GUIDED CORE BIOPSY AND FNA\par  POSITIVE FOR MALIGNANT CELLS.\par  B-cell lymphoma of follicular center origin, most consistent with\par  follicular lymphoma, grade 3A.  See note.\par  \par  Fine Needle Aspiration Addendum Report - Auth (Verified)\par  \par  Addendum\par  2. LYMPH NODE, CERVICAL, LEFT POSTERIOR, US GUIDED CORE BIOPSY AND FNA\par  POSITIVE FOR MALIGNANT CELLS.\par  B-cell lymphoma of follicular center origin with high proliferation index.\par  See note.\par  \par  Note:\par  The neoplastic B cells demonstrate a follicular center B-cell phenotype with MUM-1 co-expression and a high proliferation index.  Follicular dendritic meshwork is present in most areas of the biopsy, consistent\par  with follicular lymphoma.  However, there is one focal area with increased larger cells and lack of follicular dendritic meshwork. Therefore, a high grade component can not be excluded.  If clinically indicated, suggest excisional biopsy for definitive classification.\par  \par  Immunohistochemical stains   (CD3, CD5, CD10, CD20, CD21, CD23, CD30, BCL-6, BCL-2, cyclinD1, ki-67, c-MYC, PAX-5, MUM-1, p53, ISAURO) were performed on block 2C.  The neoplastic cells are positive for CD20, PAX-5, BCL-6, BCL-2, MUM-1 (partial), dim p53; negative CD5, CD10, CD30, cyclinD1, ISAURO.  CD21 and CD23 highlight follicular dendritic meshwork in most areas with focal absence of follicular dendritic meshwork. \par  Ki-67 proliferation index is approximately 60 to 70%.  C-MYC stains approximately 20 to 30% of cells.  CD3 and CD5 highlight some T-cells in the background. [de-identified] : 6/14/22 FNA of Left axilla LN: FLUORESCENCE IN-SITU HYBRIDIZATION (FISH)\par  1. No evidence of MYC Rearrangement\par  2. No evidence of BCL2-IGH [translocation t(14;18)] gene rearrangement\par  3. 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 [FreeTextEntry1] : Tafasitamab plus Revlimid 20 mg daily 21/28 days. Revlimid was held mid May 2023 to the end of Aug 2023 due to Neutropenia. Also Tafasitamab has been intermittently held due to neutropenia, will resume on 8/26/23 [de-identified] : 5/18/22: Initial Visit.  6/20/22: Follow-up. Patient feels well overall. Lymphoma labs and left axilla LN biopsy revealed grade 3A follicular lymphoma, IgG Lambda and Kappa MGUS. He reports lymph nodes have been stable. Heart function is stable. He denies having any recent fevers, chills, nausea. No weight changes.  8/22/22: Follow-up. Patient feels well overall. Awaiting for biopsy results of lymph nodes in his back. He does not have pain in the left back. The left side of his neck gives him discomfort. He has never received chemotherapy nor antibody treatment in the past. No fevers, chills, night sweats. No new noticeable masses  Good appetite, lost 7 lbs (lost a lot of fluid weight due to diuretics).   10/17/22: Follow up. In the interim, he was hospitalized at [facility] twice (8/27-9/12 & 9/16-10/3). 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 Patient was treated with rasburicase, but developed rasburicase-triggered G6PD hemolysis. Also found to be in acute exacerbation of HFrEF and have a prolonged QTc (likely medication-induced). During hospital stay was found to have altered mental status. 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 In mid-September patient was hospitalized for Encephalopathy (+Rhinovirus/enterovirus) unrelated to the Lymphoma. Today, he feels at baseline. Notes resolution of cervical LNs, and back lesions since starting treatment. Patient denies fever, chills, night sweats, back pain, abdominal pain, chest pain, or shortness of breath. Fair appetite, weight loss due to prolonged hospitalizations.  11/17/22: Follow-up. On 10/28/22 at home was found down by his daughter found to have fever, STEPHEN and AMS and was admitted for acute metabolic encephalopathy with sepsis 2/2 pneumonia s/p 7 days zosyn with improvement. Today he presents from rehab with his daughter. He is not feeling well. He feels that he is weak and fatigued. He has been doing PT / walking around the facility. He reads when awake. He has a poor appetite, but his family is bringing in food that he likes. No fevers, chills, night sweats. No new lumps or masses.  2/6/23: Follow-up. He was readmitted [facility] 1/9/23 to 1/20/23 for malaise and cytopenias. In December 2022 he had pneumonia and COVID19 for which there has been a long recovery. Due to these complications, his treatment with chemoimmunotherapy (O-miniCHOP) was discontinued. He is currently staying in a rehab to improve his performance status. He overall feels well today and reports feeling much improved relative to Dec 2022. He eats 3 meals a day, but prefers to eat late at night. His appetite is good and his daughter brings a lot of food supplementation for him. He continues to lose weight however. No other recent illnesses. He had a PET-CT last week.  3/13/23: Followup. He has not yet rec'd revlimid but is now approved for free drug. Continues to have issues gaining weight, and reports a very good appetite. He has not lost weight at least. He also has been having right foot pain between the ankle and toes since the night of 3/6/23. He also has a rash on toes 1-3. He continues to have right lateral abdominal itchiness around a site of swelling / lump. This has not changed in the past month. He is now back home. He is able to ambulate with a walker. He is not limited by dyspnea. He had edema in the rehab which has resolved. Per family he has also been having intermittent periods where he is not himself and he becomes aggressive toward family members. This has been an ongoing issue for sometime   3/30/23: Follow-up. Today is Cycle 1 Day 1 of Tafa. He has not yet rec'd revlimid (expected delivery today); is approved for free drug via [employer]. Reports intentional weight gain over the past couple of weeks and ambulating with a walker. Continues to have right foot pain between the ankle and toes since the night of 3/6/23, and have right lateral abdominal itchiness around a site of swelling / lump. This has not changed in the past month. He is now back home. Per family he has intermittent periods where he is not himself and he becomes aggressive toward family members.   4/6/23: Follow-up. Today is Cycle 1 Day 8 of Tafasitamab. Did receive revlimid on day 1 and has been tolerating Revlimid well. Denies any abdominal issues, continues to have a good appetite. Ambulating with a walker but continues to have right foot pain, and have right lateral abdominal itchiness around a site of swelling / lump. Per family he has intermittent periods where he is not himself and he becomes aggressive toward family members.   4/13/23: Follow-up. Today is Cycle 1 Day 15 of Tafasitamab. He is tolerating the regimen well. Denies any abdominal issues, continues to have a good appetite. Ambulating with a walker but continues to have right foot pain, and have right lateral abdominal itchiness around a site of swelling / lump. Per family he has intermittent periods where he is not himself and he becomes aggressive toward family members.   4/20/23: Follow-up. Today is Cycle 1 Day 22. Today he reports feeling well, but has noticed his right leg / foot is swollen and associated with shoe tightness. He reports that he has been taking his apixaban which he takes for cardiac reasons. His ANC is 0, but denies any mucositis, or other infectious issues. No new lumps or masses. His right abd mass is decreasing in size.  4/27/23: Follow-up. Today is Cycle 1 Day 29. After receiving zarxio he developed a facial rash which is now self resolved. He remains off of Revlimid due to neutropenia. Has the mediport insertion scheduled for May 9th. Today he reports feeling well, his right leg / foot remains stable and swollen; associated with shoe tightness. US Duplex (4/20/23) negative for DVT. He reports that he has been taking his apixaban which he takes for cardiac reasons. Denies any mucositis, or other infectious issues. No new lumps or masses. His right abd mass is decreasing in size.  5/4/23: Follow-up. Today is Cycle 1 Day 36; has been unable to receive Tafa due to peripheral access limitation therefore cycle 2 has not been counted. He has restarted Revlimid at a lowered dose of 15mg due to neutropenia, tolerating well without neutropenia thus far. He has the mediport insertion scheduled for May 9th. Today he reports feeling well, his right leg / foot remains stable and swollen;US Duplex (4/20/23) negative for DVT. Remains on apixaban which he takes for cardiac reasons. Denies any mucositis, or other infectious issues. No new lumps or masses. His right abd mass is no longer palpable.  5/25/23: Follow-up. Today is Cycle 2 Day 15. He has a mediport in place now and is able to receive the Tafasitamab without issue, tolerating Revlimid well at a lowered dose of 15mg due to neutropenia. Today he reports feeling well, his intermittent right leg swelling is much improved. Has been hydrating well recently, given elevated BUN. Remains on apixaban which he takes for cardiac reasons. Denies any bleeding, mucositis, masses/lumps, fever, chills, or night sweats. Good appetite.  6/19/23: Follow-up. Today is Cycle 3 Day 12 of Tafa + Revlimid. Due to neutropenia about 2 weeks ago he was again advised to hold the Revlimid until further notice. He has been having swelling in his hands, left > right with significant pain (gout-like) in the left thumb. He developed a sore throat yesterday at home and was using honey to help soothe. Today he only has a sore throat when swallowing liquids. He was aslo having chills, confused and having visual hallucinations.  He denies chest pain or shortness of breath. He ambulated into the center today, per daughter he has been walking much slower. He denies any issues with bowel or urinary function. He reports he is hydrating a couple times a day per daughter up to 500 mL a day. He is still off revlimid. Blood pressure rechecked 86/58.  7/26/23: Follow-up. He feels well today. He has noticed all of his prior lymphadenopathy disappear. He is not noticing any new lumps or masses. He denies constitutional issues. His neutrophils remain in the severely low range, however he has not developed any infections. He has been taking abx ppx. We will hold tafa as it can also negatively affect neutrophils. He has a left epicondylar mass that he states he has had for a very long time (years), which we will watch.  8/16/23: Follow-up. Mr Skaggs presents today with his daughter. He just arrived from seeing a neurologist, Dr Childers regarding his memory issues and occasional personality changes. Vonnie his daughter states that the past month has been better. He also has been evaluated at the [facility] clinic in [place] for his past exposures and is awaiting word on the status of that. His left elbow area swelling has not changed. He has not noticed any new lumps or masses. No constitutional issues. His neutrophils have since recovered and were likely low primarily due to Monjuvi (tafa). ROS was negative for other issues.  9/6/23: Follow up. Patient reports feeling well today. He gained 8lbs since last visit. His WBC 5.29, Hb 10.4, Plt 98 today. No concerning signs of symptoms today.  10/18/23: Follow-up. Today is Cycle 6 Day 26. He feels well today. He has not noticed any new lumps or masses. All prior masses have resolved. He has not had any new infections or constitutional issues. He remains active at home without health related limitations.  12/6/23: Follow-up. The prior 2 treatments (2 weeks) have been held due to asymptomatic neutropenia. His ANC is starting to recover today. He has been off Revlimid for over 2 weeks. He has not received Tafa since 11/4/23. Will aim for next dose 12/30/23 with a CBC check 12/27/23. No active issues today. No lumps or masses per hx or exam. No recent illnesses, fevers, constitutional issues.  2/5/24: Follow-up. He saw Dr Munguia (Geriatrics) earlier today to discuss general medical issues. His BP was elevated and was repeated today here in the oncology office at 158/71, noted Hx of white coat syndrome. He feels overall well. No new issues. He denies any lumps and masses. All former masses have resolved. He has not had any recent infections. His ANC has remained >1000 since Dec 28, 2023. No constitutional issues. He is tolerating therapy without any side effects currently.  4/18/24: Follow-up. He feels overall well, good levels of energy. No new issues. He denies any lumps and masses. All former masses have resolved. He has not had any recent infections. His ANC has remained >1000 since Dec 28, 2023. No constitutional issues. He is tolerating therapy without any side effects currently.  A comprehensive review of systems was performed including constitutional, eyes, ENT, cardiovascular, respiratory, gastrointestinal, genitourinary, musculoskeletal, integumentary, neurological, psychiatric and hematologic / lymphatic. All pertinent positives are included in the H&P under interval history above and the remaining review of systems listed are negative.   ==================================================== Treatment Hx:  Obinutuzumab-mini-COEP q21 days x 3 cycles (incomplete due to toxicities and patient preference to switch to more tolerable regimen) (Obinutuzumab modified mini-COEP: 400 mg/m cyclophosphamide, Etoposide (40% dose reduced to 30 mg/m2 IV on day 1 and 60 mg/m2 PO on days 2 and 3 of each cycle), and 2 mg vincristine (flat dose) on day 1 of each cycle, and 100 mg prednisone on days 1-5. Modified from Man et al 2009 Blood "R-CHOP with Etoposide Substituted for Doxorubicin (R-CEOP): Excellent Outcome in Diffuse Large B Cell Lymphoma for Patients with a Contraindication to Anthracyclines." with mini- dosing for elderly patients) - Cycle 1 Day 1 given 9/2/22 - third dose of Obinutuzumab held due to AMS, requiring admission to [facility] found to have enterovirus infection - Cycle 2 Day 1 given 9/30/22 - Given as an inpatient at [facility] - Cycle 3 Day 1 given 10/21/22 - Complicated by pneumonia, requiring admission and rehab placement  Tafasitamab plus Revlimid (changed therapy due to patient and family's wishes due to intolerable toxicities), On 28 day cycles. - Cycle 1 Day 1 on 3/30/23 (HELD revlimid briefly starting 4/20/23 due to neutropenia) - Cycle 2 Day 1 on 5/11/23 (HELD Revlimid since end of May, not yet restarted due to prolonged neutropenia) - Cycle 3 Day 1 on 6/8/23 (On 6/29/23 during cycle 3 he had experienced hypotension down to 86/58 in setting of sore throat and chills, confusion with visual hallucinations in the setting of neutropenia and was sent to [facility] ED, delayed tx 1 week) - Cycle 4 day 1 on 7/15/23 - Cycle 5 Day 1 was HELD on 8/12/23 for persistent severe neutropenia without infection / complications, delayed until 8/26/23 - Cycle 6 Day 1 on 9/23/23 - Cycle 7 Day 1 on 10/21/23 (HELD treatment related to severe neutropenia) - Cycle 8 Day 1 now planned for 12/30/23 (was initially on 11/18/23, however the entire cycle has been held due to severe neutropenia) - Cycle 9 Day 1 on 1/27/24 - Cycle 10 Day 1 on 2/24/24 - Cycle 11 Day 1 on 3/23/24 - Cycle 12 Day 1 planned for 4/20/24  ====================================================

## 2024-04-20 ENCOUNTER — RESULT REVIEW (OUTPATIENT)
Age: 74
End: 2024-04-20

## 2024-04-20 ENCOUNTER — APPOINTMENT (OUTPATIENT)
Dept: INFUSION THERAPY | Facility: HOSPITAL | Age: 74
End: 2024-04-20

## 2024-04-20 ENCOUNTER — APPOINTMENT (OUTPATIENT)
Dept: HEMATOLOGY ONCOLOGY | Facility: CLINIC | Age: 74
End: 2024-04-20

## 2024-04-20 LAB
BASOPHILS # BLD AUTO: 0.08 K/UL — SIGNIFICANT CHANGE UP (ref 0–0.2)
BASOPHILS NFR BLD AUTO: 2.3 % — HIGH (ref 0–2)
EOSINOPHIL # BLD AUTO: 0.24 K/UL — SIGNIFICANT CHANGE UP (ref 0–0.5)
EOSINOPHIL NFR BLD AUTO: 6.8 % — HIGH (ref 0–6)
HCT VFR BLD CALC: 37.5 % — LOW (ref 39–50)
HGB BLD-MCNC: 12.6 G/DL — LOW (ref 13–17)
IMM GRANULOCYTES NFR BLD AUTO: 0.8 % — SIGNIFICANT CHANGE UP (ref 0–0.9)
LYMPHOCYTES # BLD AUTO: 0.62 K/UL — LOW (ref 1–3.3)
LYMPHOCYTES # BLD AUTO: 17.6 % — SIGNIFICANT CHANGE UP (ref 13–44)
MCHC RBC-ENTMCNC: 31.3 PG — SIGNIFICANT CHANGE UP (ref 27–34)
MCHC RBC-ENTMCNC: 33.6 G/DL — SIGNIFICANT CHANGE UP (ref 32–36)
MCV RBC AUTO: 93.1 FL — SIGNIFICANT CHANGE UP (ref 80–100)
MONOCYTES # BLD AUTO: 0.52 K/UL — SIGNIFICANT CHANGE UP (ref 0–0.9)
MONOCYTES NFR BLD AUTO: 14.7 % — HIGH (ref 2–14)
NEUTROPHILS # BLD AUTO: 2.04 K/UL — SIGNIFICANT CHANGE UP (ref 1.8–7.4)
NEUTROPHILS NFR BLD AUTO: 57.8 % — SIGNIFICANT CHANGE UP (ref 43–77)
NRBC # BLD: 0 /100 WBCS — SIGNIFICANT CHANGE UP (ref 0–0)
PLATELET # BLD AUTO: 124 K/UL — LOW (ref 150–400)
RBC # BLD: 4.03 M/UL — LOW (ref 4.2–5.8)
RBC # FLD: 13.8 % — SIGNIFICANT CHANGE UP (ref 10.3–14.5)
WBC # BLD: 3.53 K/UL — LOW (ref 3.8–10.5)
WBC # FLD AUTO: 3.53 K/UL — LOW (ref 3.8–10.5)

## 2024-04-21 LAB
ALBUMIN SERPL ELPH-MCNC: 4.2 G/DL — SIGNIFICANT CHANGE UP (ref 3.3–5)
ALP SERPL-CCNC: 111 U/L — SIGNIFICANT CHANGE UP (ref 40–120)
ALT FLD-CCNC: 14 U/L — SIGNIFICANT CHANGE UP (ref 10–45)
ANION GAP SERPL CALC-SCNC: 18 MMOL/L — HIGH (ref 5–17)
AST SERPL-CCNC: 16 U/L — SIGNIFICANT CHANGE UP (ref 10–40)
BILIRUB SERPL-MCNC: 0.4 MG/DL — SIGNIFICANT CHANGE UP (ref 0.2–1.2)
BUN SERPL-MCNC: 26 MG/DL — HIGH (ref 7–23)
CALCIUM SERPL-MCNC: 9.4 MG/DL — SIGNIFICANT CHANGE UP (ref 8.4–10.5)
CHLORIDE SERPL-SCNC: 106 MMOL/L — SIGNIFICANT CHANGE UP (ref 96–108)
CO2 SERPL-SCNC: 22 MMOL/L — SIGNIFICANT CHANGE UP (ref 22–31)
CREAT SERPL-MCNC: 1.34 MG/DL — HIGH (ref 0.5–1.3)
EGFR: 56 ML/MIN/1.73M2 — LOW
GLUCOSE SERPL-MCNC: 81 MG/DL — SIGNIFICANT CHANGE UP (ref 70–99)
LDH SERPL L TO P-CCNC: 290 U/L — HIGH (ref 50–242)
LDH SERPL L TO P-CCNC: 323 U/L — HIGH (ref 50–242)
MAGNESIUM SERPL-MCNC: 1.8 MG/DL — SIGNIFICANT CHANGE UP (ref 1.6–2.6)
MAGNESIUM SERPL-MCNC: 1.8 MG/DL — SIGNIFICANT CHANGE UP (ref 1.6–2.6)
PHOSPHATE SERPL-MCNC: 2.6 MG/DL — SIGNIFICANT CHANGE UP (ref 2.5–4.5)
PHOSPHATE SERPL-MCNC: 2.7 MG/DL — SIGNIFICANT CHANGE UP (ref 2.5–4.5)
POTASSIUM SERPL-MCNC: 4 MMOL/L — SIGNIFICANT CHANGE UP (ref 3.5–5.3)
POTASSIUM SERPL-SCNC: 4 MMOL/L — SIGNIFICANT CHANGE UP (ref 3.5–5.3)
PROT SERPL-MCNC: 7.2 G/DL — SIGNIFICANT CHANGE UP (ref 6–8.3)
SODIUM SERPL-SCNC: 146 MMOL/L — HIGH (ref 135–145)
URATE SERPL-MCNC: 6.9 MG/DL — SIGNIFICANT CHANGE UP (ref 3.4–8.8)
URATE SERPL-MCNC: 6.9 MG/DL — SIGNIFICANT CHANGE UP (ref 3.4–8.8)

## 2024-04-25 ENCOUNTER — OUTPATIENT (OUTPATIENT)
Dept: OUTPATIENT SERVICES | Facility: HOSPITAL | Age: 74
LOS: 1 days | Discharge: HOME | End: 2024-04-25

## 2024-04-25 DIAGNOSIS — C82.20 FOLLICULAR LYMPHOMA GRADE III, UNSPECIFIED, UNSPECIFIED SITE: ICD-10-CM

## 2024-04-25 DIAGNOSIS — C85.90 NON-HODGKIN LYMPHOMA, UNSPECIFIED, UNSPECIFIED SITE: Chronic | ICD-10-CM

## 2024-04-25 DIAGNOSIS — Z95.0 PRESENCE OF CARDIAC PACEMAKER: Chronic | ICD-10-CM

## 2024-04-25 NOTE — ED ADULT NURSE NOTE - NSFALLRSKPASTHIST_ED_ALL_ED
----- Message from Rgei Fonseca sent at 4/25/2024  9:20 AM CDT -----  Regarding: prescription needed  [phone] - call back ; need prescription for her     ACCU-CHEK SOFT DEV LANCETS Kit (she lost her current kit)      Send to :          [facility] - [place], [place] - [address]  [address] [address]  Phone: [phone] Fax: [phone]  Hours: Not open 24 hours   no

## 2024-05-04 ENCOUNTER — RESULT REVIEW (OUTPATIENT)
Age: 74
End: 2024-05-04

## 2024-05-04 ENCOUNTER — APPOINTMENT (OUTPATIENT)
Dept: HEMATOLOGY ONCOLOGY | Facility: CLINIC | Age: 74
End: 2024-05-04

## 2024-05-04 ENCOUNTER — APPOINTMENT (OUTPATIENT)
Dept: INFUSION THERAPY | Facility: HOSPITAL | Age: 74
End: 2024-05-04

## 2024-05-04 LAB
ALBUMIN SERPL ELPH-MCNC: 4.2 G/DL — SIGNIFICANT CHANGE UP (ref 3.3–5)
ALP SERPL-CCNC: 129 U/L — HIGH (ref 40–120)
ALT FLD-CCNC: 16 U/L — SIGNIFICANT CHANGE UP (ref 10–45)
ANION GAP SERPL CALC-SCNC: 14 MMOL/L — SIGNIFICANT CHANGE UP (ref 5–17)
AST SERPL-CCNC: 19 U/L — SIGNIFICANT CHANGE UP (ref 10–40)
BASOPHILS # BLD AUTO: 0.06 K/UL — SIGNIFICANT CHANGE UP (ref 0–0.2)
BASOPHILS NFR BLD AUTO: 1.2 % — SIGNIFICANT CHANGE UP (ref 0–2)
BILIRUB SERPL-MCNC: 0.4 MG/DL — SIGNIFICANT CHANGE UP (ref 0.2–1.2)
BUN SERPL-MCNC: 21 MG/DL — SIGNIFICANT CHANGE UP (ref 7–23)
CALCIUM SERPL-MCNC: 8.9 MG/DL — SIGNIFICANT CHANGE UP (ref 8.4–10.5)
CHLORIDE SERPL-SCNC: 105 MMOL/L — SIGNIFICANT CHANGE UP (ref 96–108)
CO2 SERPL-SCNC: 25 MMOL/L — SIGNIFICANT CHANGE UP (ref 22–31)
CREAT SERPL-MCNC: 1.22 MG/DL — SIGNIFICANT CHANGE UP (ref 0.5–1.3)
EGFR: 63 ML/MIN/1.73M2 — SIGNIFICANT CHANGE UP
EOSINOPHIL # BLD AUTO: 0.58 K/UL — HIGH (ref 0–0.5)
EOSINOPHIL NFR BLD AUTO: 11.4 % — HIGH (ref 0–6)
GLUCOSE SERPL-MCNC: 106 MG/DL — HIGH (ref 70–99)
HCT VFR BLD CALC: 39.4 % — SIGNIFICANT CHANGE UP (ref 39–50)
HGB BLD-MCNC: 13.3 G/DL — SIGNIFICANT CHANGE UP (ref 13–17)
IMM GRANULOCYTES NFR BLD AUTO: 0.8 % — SIGNIFICANT CHANGE UP (ref 0–0.9)
LYMPHOCYTES # BLD AUTO: 0.67 K/UL — LOW (ref 1–3.3)
LYMPHOCYTES # BLD AUTO: 13.2 % — SIGNIFICANT CHANGE UP (ref 13–44)
MCHC RBC-ENTMCNC: 31.8 PG — SIGNIFICANT CHANGE UP (ref 27–34)
MCHC RBC-ENTMCNC: 33.8 G/DL — SIGNIFICANT CHANGE UP (ref 32–36)
MCV RBC AUTO: 94.3 FL — SIGNIFICANT CHANGE UP (ref 80–100)
MONOCYTES # BLD AUTO: 0.75 K/UL — SIGNIFICANT CHANGE UP (ref 0–0.9)
MONOCYTES NFR BLD AUTO: 14.7 % — HIGH (ref 2–14)
NEUTROPHILS # BLD AUTO: 2.99 K/UL — SIGNIFICANT CHANGE UP (ref 1.8–7.4)
NEUTROPHILS NFR BLD AUTO: 58.7 % — SIGNIFICANT CHANGE UP (ref 43–77)
NRBC # BLD: 0 /100 WBCS — SIGNIFICANT CHANGE UP (ref 0–0)
PLATELET # BLD AUTO: 118 K/UL — LOW (ref 150–400)
POTASSIUM SERPL-MCNC: 4 MMOL/L — SIGNIFICANT CHANGE UP (ref 3.5–5.3)
POTASSIUM SERPL-SCNC: 4 MMOL/L — SIGNIFICANT CHANGE UP (ref 3.5–5.3)
PROT SERPL-MCNC: 7.3 G/DL — SIGNIFICANT CHANGE UP (ref 6–8.3)
RBC # BLD: 4.18 M/UL — LOW (ref 4.2–5.8)
RBC # FLD: 13.8 % — SIGNIFICANT CHANGE UP (ref 10.3–14.5)
SODIUM SERPL-SCNC: 143 MMOL/L — SIGNIFICANT CHANGE UP (ref 135–145)
WBC # BLD: 5.09 K/UL — SIGNIFICANT CHANGE UP (ref 3.8–10.5)
WBC # FLD AUTO: 5.09 K/UL — SIGNIFICANT CHANGE UP (ref 3.8–10.5)

## 2024-05-06 DIAGNOSIS — R11.2 NAUSEA WITH VOMITING, UNSPECIFIED: ICD-10-CM

## 2024-05-06 DIAGNOSIS — Z51.11 ENCOUNTER FOR ANTINEOPLASTIC CHEMOTHERAPY: ICD-10-CM

## 2024-05-18 ENCOUNTER — RESULT REVIEW (OUTPATIENT)
Age: 74
End: 2024-05-18

## 2024-05-18 ENCOUNTER — APPOINTMENT (OUTPATIENT)
Dept: HEMATOLOGY ONCOLOGY | Facility: CLINIC | Age: 74
End: 2024-05-18

## 2024-05-18 ENCOUNTER — APPOINTMENT (OUTPATIENT)
Dept: INFUSION THERAPY | Facility: HOSPITAL | Age: 74
End: 2024-05-18

## 2024-05-18 LAB
ALBUMIN SERPL ELPH-MCNC: 4.3 G/DL — SIGNIFICANT CHANGE UP (ref 3.3–5)
ALP SERPL-CCNC: 113 U/L — SIGNIFICANT CHANGE UP (ref 40–120)
ALT FLD-CCNC: 15 U/L — SIGNIFICANT CHANGE UP (ref 10–45)
ANION GAP SERPL CALC-SCNC: 13 MMOL/L — SIGNIFICANT CHANGE UP (ref 5–17)
AST SERPL-CCNC: 17 U/L — SIGNIFICANT CHANGE UP (ref 10–40)
BASOPHILS # BLD AUTO: 0.07 K/UL — SIGNIFICANT CHANGE UP (ref 0–0.2)
BASOPHILS NFR BLD AUTO: 1.9 % — SIGNIFICANT CHANGE UP (ref 0–2)
BILIRUB SERPL-MCNC: 0.5 MG/DL — SIGNIFICANT CHANGE UP (ref 0.2–1.2)
BUN SERPL-MCNC: 20 MG/DL — SIGNIFICANT CHANGE UP (ref 7–23)
CALCIUM SERPL-MCNC: 9 MG/DL — SIGNIFICANT CHANGE UP (ref 8.4–10.5)
CHLORIDE SERPL-SCNC: 104 MMOL/L — SIGNIFICANT CHANGE UP (ref 96–108)
CO2 SERPL-SCNC: 25 MMOL/L — SIGNIFICANT CHANGE UP (ref 22–31)
CREAT SERPL-MCNC: 1.34 MG/DL — HIGH (ref 0.5–1.3)
EGFR: 56 ML/MIN/1.73M2 — LOW
EOSINOPHIL # BLD AUTO: 0.44 K/UL — SIGNIFICANT CHANGE UP (ref 0–0.5)
EOSINOPHIL NFR BLD AUTO: 11.7 % — HIGH (ref 0–6)
GLUCOSE SERPL-MCNC: 113 MG/DL — HIGH (ref 70–99)
HCT VFR BLD CALC: 35.8 % — LOW (ref 39–50)
HGB BLD-MCNC: 12.3 G/DL — LOW (ref 13–17)
IMM GRANULOCYTES NFR BLD AUTO: 1.1 % — HIGH (ref 0–0.9)
LDH SERPL L TO P-CCNC: 241 U/L — SIGNIFICANT CHANGE UP (ref 50–242)
LYMPHOCYTES # BLD AUTO: 0.45 K/UL — LOW (ref 1–3.3)
LYMPHOCYTES # BLD AUTO: 11.9 % — LOW (ref 13–44)
MAGNESIUM SERPL-MCNC: 1.7 MG/DL — SIGNIFICANT CHANGE UP (ref 1.6–2.6)
MCHC RBC-ENTMCNC: 31.7 PG — SIGNIFICANT CHANGE UP (ref 27–34)
MCHC RBC-ENTMCNC: 34.4 G/DL — SIGNIFICANT CHANGE UP (ref 32–36)
MCV RBC AUTO: 92.3 FL — SIGNIFICANT CHANGE UP (ref 80–100)
MONOCYTES # BLD AUTO: 0.5 K/UL — SIGNIFICANT CHANGE UP (ref 0–0.9)
MONOCYTES NFR BLD AUTO: 13.3 % — SIGNIFICANT CHANGE UP (ref 2–14)
NEUTROPHILS # BLD AUTO: 2.27 K/UL — SIGNIFICANT CHANGE UP (ref 1.8–7.4)
NEUTROPHILS NFR BLD AUTO: 60.1 % — SIGNIFICANT CHANGE UP (ref 43–77)
NRBC # BLD: 0 /100 WBCS — SIGNIFICANT CHANGE UP (ref 0–0)
PHOSPHATE SERPL-MCNC: 3.1 MG/DL — SIGNIFICANT CHANGE UP (ref 2.5–4.5)
PLATELET # BLD AUTO: 127 K/UL — LOW (ref 150–400)
POTASSIUM SERPL-MCNC: 4.2 MMOL/L — SIGNIFICANT CHANGE UP (ref 3.5–5.3)
POTASSIUM SERPL-SCNC: 4.2 MMOL/L — SIGNIFICANT CHANGE UP (ref 3.5–5.3)
PROT SERPL-MCNC: 7.4 G/DL — SIGNIFICANT CHANGE UP (ref 6–8.3)
RBC # BLD: 3.88 M/UL — LOW (ref 4.2–5.8)
RBC # FLD: 12.9 % — SIGNIFICANT CHANGE UP (ref 10.3–14.5)
SODIUM SERPL-SCNC: 142 MMOL/L — SIGNIFICANT CHANGE UP (ref 135–145)
URATE SERPL-MCNC: 6.5 MG/DL — SIGNIFICANT CHANGE UP (ref 3.4–8.8)
WBC # BLD: 3.77 K/UL — LOW (ref 3.8–10.5)
WBC # FLD AUTO: 3.77 K/UL — LOW (ref 3.8–10.5)

## 2024-06-01 ENCOUNTER — RESULT REVIEW (OUTPATIENT)
Age: 74
End: 2024-06-01

## 2024-06-01 ENCOUNTER — APPOINTMENT (OUTPATIENT)
Dept: INFUSION THERAPY | Facility: HOSPITAL | Age: 74
End: 2024-06-01

## 2024-06-01 ENCOUNTER — APPOINTMENT (OUTPATIENT)
Dept: HEMATOLOGY ONCOLOGY | Facility: CLINIC | Age: 74
End: 2024-06-01

## 2024-06-01 LAB
BASOPHILS # BLD AUTO: 0.11 K/UL — SIGNIFICANT CHANGE UP (ref 0–0.2)
BASOPHILS NFR BLD AUTO: 1.9 % — SIGNIFICANT CHANGE UP (ref 0–2)
EOSINOPHIL # BLD AUTO: 0.46 K/UL — SIGNIFICANT CHANGE UP (ref 0–0.5)
EOSINOPHIL NFR BLD AUTO: 7.8 % — HIGH (ref 0–6)
HCT VFR BLD CALC: 36.5 % — LOW (ref 39–50)
HGB BLD-MCNC: 12.5 G/DL — LOW (ref 13–17)
IMM GRANULOCYTES NFR BLD AUTO: 0.3 % — SIGNIFICANT CHANGE UP (ref 0–0.9)
LDH SERPL L TO P-CCNC: 286 U/L — HIGH (ref 50–242)
LYMPHOCYTES # BLD AUTO: 0.77 K/UL — LOW (ref 1–3.3)
LYMPHOCYTES # BLD AUTO: 13.1 % — SIGNIFICANT CHANGE UP (ref 13–44)
MAGNESIUM SERPL-MCNC: 1.8 MG/DL — SIGNIFICANT CHANGE UP (ref 1.6–2.6)
MCHC RBC-ENTMCNC: 31.2 PG — SIGNIFICANT CHANGE UP (ref 27–34)
MCHC RBC-ENTMCNC: 34.2 G/DL — SIGNIFICANT CHANGE UP (ref 32–36)
MCV RBC AUTO: 91 FL — SIGNIFICANT CHANGE UP (ref 80–100)
MONOCYTES # BLD AUTO: 0.6 K/UL — SIGNIFICANT CHANGE UP (ref 0–0.9)
MONOCYTES NFR BLD AUTO: 10.2 % — SIGNIFICANT CHANGE UP (ref 2–14)
NEUTROPHILS # BLD AUTO: 3.91 K/UL — SIGNIFICANT CHANGE UP (ref 1.8–7.4)
NEUTROPHILS NFR BLD AUTO: 66.7 % — SIGNIFICANT CHANGE UP (ref 43–77)
NRBC # BLD: 0 /100 WBCS — SIGNIFICANT CHANGE UP (ref 0–0)
PHOSPHATE SERPL-MCNC: 2.9 MG/DL — SIGNIFICANT CHANGE UP (ref 2.5–4.5)
PLATELET # BLD AUTO: 111 K/UL — LOW (ref 150–400)
RBC # BLD: 4.01 M/UL — LOW (ref 4.2–5.8)
RBC # FLD: 13.3 % — SIGNIFICANT CHANGE UP (ref 10.3–14.5)
URATE SERPL-MCNC: 6.7 MG/DL — SIGNIFICANT CHANGE UP (ref 3.4–8.8)
WBC # BLD: 5.87 K/UL — SIGNIFICANT CHANGE UP (ref 3.8–10.5)
WBC # FLD AUTO: 5.87 K/UL — SIGNIFICANT CHANGE UP (ref 3.8–10.5)

## 2024-06-04 ENCOUNTER — NON-APPOINTMENT (OUTPATIENT)
Age: 74
End: 2024-06-04

## 2024-06-04 ENCOUNTER — APPOINTMENT (OUTPATIENT)
Dept: ELECTROPHYSIOLOGY | Facility: CLINIC | Age: 74
End: 2024-06-04
Payer: MEDICARE

## 2024-06-04 PROCEDURE — 93296 REM INTERROG EVL PM/IDS: CPT

## 2024-06-04 PROCEDURE — 93294 REM INTERROG EVL PM/LDLS PM: CPT

## 2024-06-05 ENCOUNTER — APPOINTMENT (OUTPATIENT)
Dept: GERIATRICS | Facility: CLINIC | Age: 74
End: 2024-06-05
Payer: MEDICARE

## 2024-06-05 VITALS
DIASTOLIC BLOOD PRESSURE: 73 MMHG | OXYGEN SATURATION: 99 % | RESPIRATION RATE: 15 BRPM | WEIGHT: 166 LBS | HEART RATE: 76 BPM | BODY MASS INDEX: 23.15 KG/M2 | TEMPERATURE: 97.6 F | SYSTOLIC BLOOD PRESSURE: 185 MMHG

## 2024-06-05 VITALS — DIASTOLIC BLOOD PRESSURE: 70 MMHG | SYSTOLIC BLOOD PRESSURE: 160 MMHG

## 2024-06-05 DIAGNOSIS — Z82.0 FAMILY HISTORY OF EPILEPSY AND OTHER DISEASES OF THE NERVOUS SYSTEM: ICD-10-CM

## 2024-06-05 DIAGNOSIS — F03.90 UNSPECIFIED DEMENTIA W/OUT BEHAVIORAL DISTURBANCE: ICD-10-CM

## 2024-06-05 DIAGNOSIS — I10 ESSENTIAL (PRIMARY) HYPERTENSION: ICD-10-CM

## 2024-06-05 DIAGNOSIS — Z71.89 OTHER SPECIFIED COUNSELING: ICD-10-CM

## 2024-06-05 PROCEDURE — 99483 ASSMT & CARE PLN PT COG IMP: CPT | Mod: GC

## 2024-06-05 RX ORDER — ELECTROLYTES/DEXTROSE
SOLUTION, ORAL ORAL DAILY
Qty: 90 | Refills: 1 | Status: ACTIVE | COMMUNITY
Start: 2024-06-05

## 2024-06-10 PROBLEM — F03.90 DEMENTIA WITHOUT BEHAVIORAL DISTURBANCE, PSYCHOTIC DISTURBANCE, MOOD DISTURBANCE, OR ANXIETY, UNSPECIFIED DEMENTIA SEVERITY, UNSPECIFIED DEMENTIA TYPE: Status: ACTIVE | Noted: 2023-02-15

## 2024-06-10 PROBLEM — Z71.89 ADVANCE CARE PLANNING: Status: ACTIVE | Noted: 2020-07-14

## 2024-06-10 PROBLEM — Z82.0 FAMILY HISTORY OF ALZHEIMER'S DISEASE: Status: ACTIVE | Noted: 2024-06-05

## 2024-06-10 NOTE — HISTORY OF PRESENT ILLNESS
[No falls in past year] : Patient reported no falls in the past year [Little interest or pleasure doing things] : 1) Little interest or pleasure doing things [Feeling down, depressed, or hopeless] : 2) Feeling down, depressed, or hopeless [0] : 2) Feeling down, depressed, or hopeless: Not at all (0) [PHQ-2 Negative - No further assessment needed] : PHQ-2 Negative - No further assessment needed [Full assistance needed] : Assistance needed managing medications [Cane] : cane [Smoke Detector] : smoke detector [Grab Bars] : grab bars [Wears Seat Belt] : wears seat belt [Stable] : Status: Stable [Patient is independent with] : bathing [Independent] : transferring/mobility [] : Patient is continent. [With Patient/Caregiver] : , with patient/caregiver [Reviewed no changes] : Reviewed, no changes [Moderate] : Stage: Moderate [None] : The patient is currently asymptomatic [FreeTextEntry1] : Mr. Skaggs is 75 yo male w/ a hx of HTN, HLD, Afib on ac, CHF, CAD, follicular lymphoma w/ DLBCL s/p R-CHOP, hx of G6PD deficiency, anemia, hyperuricemia, cognitive impairment presents to the office for a follow up visit. He is acommpanied by daughter Vonnie.   He followed with specialist since last appointment. Has recent blood test which were discussed today.   Social hx: Lives by himself, needs assistance with IADls. Has filled out power of , health care proxy and MOLST form in the past. Daughter Vonnie has HCP proxy, advised to provide copy to us. MOLST form reviewed today. He has another child who is distant. Wife lives in [place].   We discussed cognitive impairment. He and daughter amenable on doing cogntive exam today in english. He has not been driving. He has not had falls, or accidents at home. He takes medication from pill box. He reads newspaper and interacts with neighbors. He does not want extra support in his house. Daughter mentioned at time after hospitalization that home care visited his house.     [de-identified] : shops together with daughter.  [de-identified] : prepares simple things.  [de-identified] : daughter helps with at home [de-identified] : daughter helps with at home  [FreeTextEntry6] : does not drive right now  [FreeTextEntry7] : has pillbox, daughter prepares.  [de-identified] : has bills on autopay,  daughter assist with higher stuff.  [BVY6Otraz] : 0 [AdvancecareDate] : 06/24 [FreeTextEntry4] : MOLST form in chart. Family to bring HCP.

## 2024-06-10 NOTE — REVIEW OF SYSTEMS
[Fever] : no fever [Chills] : no chills [Heart Rate Is Slow] : the heart rate was not slow [Chest Pain] : no chest pain [Lower Ext Edema] : no extremity edema [Shortness Of Breath] : no shortness of breath [SOB on Exertion] : no shortness of breath during exertion [Abdominal Pain] : no abdominal pain [Constipation] : no constipation [Skin Lesions] : no skin lesions [Confused] : no confusion [Dizziness] : no dizziness

## 2024-06-10 NOTE — PHYSICAL EXAM
[Alert] : alert [EOMI] : extraocular movements were intact [Normal Appearance] : the appearance of the neck was normal [Supple] : the neck was supple [No Respiratory Distress] : no respiratory distress [No Acc Muscle Use] : no accessory muscle use [Respiration, Rhythm And Depth] : normal respiratory rhythm and effort [Auscultation Breath Sounds / Voice Sounds] : lungs were clear to auscultation bilaterally [Normal S1, S2] : normal S1 and S2 [Heart Rate And Rhythm] : heart rate was normal and rhythm regular [Bowel Sounds] : normal bowel sounds [Abdomen Tenderness] : non-tender [Abdomen Soft] : soft [No Spinal Tenderness] : no spinal tenderness [No Focal Deficits] : no focal deficits [Oriented To Time, Place, And Person] : oriented to person, place, and time [Normal Affect] : the affect was normal [Normal Mood] : the mood was normal [___ / 5] : Visuospatial / Executive: [unfilled] / 5 [___ / 3] : Attention (Serial 7 subtraction): [unfilled] / 3 [___ / 1] : Fluency: [unfilled] / 1 [___ / 2] : Abstraction: [unfilled] / 2 [___ / 5] : Delayed Recall: [unfilled] / 5 [___ / 6] : Orientation: [unfilled] / 6 [MocaTotal] : 8 [FreeTextEntry1] : 06/05/2024

## 2024-06-10 NOTE — ASSESSMENT
[FreeTextEntry1] : Medication reconciliation done today. Continue with current management. All questions answered today. Follow up in a few months for surveillance. B12 and tsh with next blood draw at Hem/oncologist.  referral for resource and caregiver support.

## 2024-06-15 ENCOUNTER — RESULT REVIEW (OUTPATIENT)
Age: 74
End: 2024-06-15

## 2024-06-15 ENCOUNTER — APPOINTMENT (OUTPATIENT)
Dept: HEMATOLOGY ONCOLOGY | Facility: CLINIC | Age: 74
End: 2024-06-15

## 2024-06-15 ENCOUNTER — APPOINTMENT (OUTPATIENT)
Dept: INFUSION THERAPY | Facility: HOSPITAL | Age: 74
End: 2024-06-15

## 2024-06-15 LAB
ALBUMIN SERPL ELPH-MCNC: 4.2 G/DL — SIGNIFICANT CHANGE UP (ref 3.3–5)
ALP SERPL-CCNC: 116 U/L — SIGNIFICANT CHANGE UP (ref 40–120)
ALT FLD-CCNC: 11 U/L — SIGNIFICANT CHANGE UP (ref 10–45)
ANION GAP SERPL CALC-SCNC: 11 MMOL/L — SIGNIFICANT CHANGE UP (ref 5–17)
AST SERPL-CCNC: 17 U/L — SIGNIFICANT CHANGE UP (ref 10–40)
BASOPHILS # BLD AUTO: 0.1 K/UL — SIGNIFICANT CHANGE UP (ref 0–0.2)
BASOPHILS NFR BLD AUTO: 2.3 % — HIGH (ref 0–2)
BILIRUB SERPL-MCNC: 0.5 MG/DL — SIGNIFICANT CHANGE UP (ref 0.2–1.2)
BUN SERPL-MCNC: 22 MG/DL — SIGNIFICANT CHANGE UP (ref 7–23)
CALCIUM SERPL-MCNC: 9.1 MG/DL — SIGNIFICANT CHANGE UP (ref 8.4–10.5)
CHLORIDE SERPL-SCNC: 107 MMOL/L — SIGNIFICANT CHANGE UP (ref 96–108)
CO2 SERPL-SCNC: 26 MMOL/L — SIGNIFICANT CHANGE UP (ref 22–31)
CREAT SERPL-MCNC: 1.45 MG/DL — HIGH (ref 0.5–1.3)
EGFR: 51 ML/MIN/1.73M2 — LOW
EOSINOPHIL # BLD AUTO: 0.49 K/UL — SIGNIFICANT CHANGE UP (ref 0–0.5)
EOSINOPHIL NFR BLD AUTO: 11.1 % — HIGH (ref 0–6)
GLUCOSE SERPL-MCNC: 113 MG/DL — HIGH (ref 70–99)
HCT VFR BLD CALC: 34.7 % — LOW (ref 39–50)
HGB BLD-MCNC: 11.9 G/DL — LOW (ref 13–17)
IMM GRANULOCYTES NFR BLD AUTO: 2 % — HIGH (ref 0–0.9)
LDH SERPL L TO P-CCNC: 245 U/L — HIGH (ref 50–242)
LYMPHOCYTES # BLD AUTO: 0.68 K/UL — LOW (ref 1–3.3)
LYMPHOCYTES # BLD AUTO: 15.4 % — SIGNIFICANT CHANGE UP (ref 13–44)
MAGNESIUM SERPL-MCNC: 1.7 MG/DL — SIGNIFICANT CHANGE UP (ref 1.6–2.6)
MCHC RBC-ENTMCNC: 31.2 PG — SIGNIFICANT CHANGE UP (ref 27–34)
MCHC RBC-ENTMCNC: 34.3 G/DL — SIGNIFICANT CHANGE UP (ref 32–36)
MCV RBC AUTO: 90.8 FL — SIGNIFICANT CHANGE UP (ref 80–100)
MONOCYTES # BLD AUTO: 0.57 K/UL — SIGNIFICANT CHANGE UP (ref 0–0.9)
MONOCYTES NFR BLD AUTO: 12.9 % — SIGNIFICANT CHANGE UP (ref 2–14)
NEUTROPHILS # BLD AUTO: 2.49 K/UL — SIGNIFICANT CHANGE UP (ref 1.8–7.4)
NEUTROPHILS NFR BLD AUTO: 56.3 % — SIGNIFICANT CHANGE UP (ref 43–77)
NRBC # BLD: 0 /100 WBCS — SIGNIFICANT CHANGE UP (ref 0–0)
PHOSPHATE SERPL-MCNC: 2.7 MG/DL — SIGNIFICANT CHANGE UP (ref 2.5–4.5)
PLATELET # BLD AUTO: 113 K/UL — LOW (ref 150–400)
POTASSIUM SERPL-MCNC: 3.6 MMOL/L — SIGNIFICANT CHANGE UP (ref 3.5–5.3)
POTASSIUM SERPL-SCNC: 3.6 MMOL/L — SIGNIFICANT CHANGE UP (ref 3.5–5.3)
PROT SERPL-MCNC: 7.3 G/DL — SIGNIFICANT CHANGE UP (ref 6–8.3)
RBC # BLD: 3.82 M/UL — LOW (ref 4.2–5.8)
RBC # FLD: 13.7 % — SIGNIFICANT CHANGE UP (ref 10.3–14.5)
SODIUM SERPL-SCNC: 144 MMOL/L — SIGNIFICANT CHANGE UP (ref 135–145)
URATE SERPL-MCNC: 6.2 MG/DL — SIGNIFICANT CHANGE UP (ref 3.4–8.8)
WBC # BLD: 4.42 K/UL — SIGNIFICANT CHANGE UP (ref 3.8–10.5)
WBC # FLD AUTO: 4.42 K/UL — SIGNIFICANT CHANGE UP (ref 3.8–10.5)

## 2024-06-17 RX ORDER — ALLOPURINOL 100 MG/1
100 TABLET ORAL
Qty: 30 | Refills: 0 | Status: ACTIVE | COMMUNITY
Start: 2023-05-26 | End: 1900-01-01

## 2024-06-18 RX ORDER — LENALIDOMIDE 5 MG/1
5 CAPSULE ORAL
Qty: 21 | Refills: 0 | Status: ACTIVE | COMMUNITY
Start: 2023-08-17 | End: 1900-01-01

## 2024-06-24 ENCOUNTER — APPOINTMENT (OUTPATIENT)
Dept: HEMATOLOGY ONCOLOGY | Facility: CLINIC | Age: 74
End: 2024-06-24

## 2024-06-27 ENCOUNTER — OUTPATIENT (OUTPATIENT)
Dept: OUTPATIENT SERVICES | Facility: HOSPITAL | Age: 74
LOS: 1 days | Discharge: HOME | End: 2024-06-27

## 2024-06-27 DIAGNOSIS — C82.20 FOLLICULAR LYMPHOMA GRADE III, UNSPECIFIED, UNSPECIFIED SITE: ICD-10-CM

## 2024-06-27 DIAGNOSIS — Z95.0 PRESENCE OF CARDIAC PACEMAKER: Chronic | ICD-10-CM

## 2024-06-27 DIAGNOSIS — C85.90 NON-HODGKIN LYMPHOMA, UNSPECIFIED, UNSPECIFIED SITE: Chronic | ICD-10-CM

## 2024-06-29 ENCOUNTER — RESULT REVIEW (OUTPATIENT)
Age: 74
End: 2024-06-29

## 2024-06-29 ENCOUNTER — APPOINTMENT (OUTPATIENT)
Dept: INFUSION THERAPY | Facility: HOSPITAL | Age: 74
End: 2024-06-29

## 2024-06-29 ENCOUNTER — APPOINTMENT (OUTPATIENT)
Dept: HEMATOLOGY ONCOLOGY | Facility: CLINIC | Age: 74
End: 2024-06-29

## 2024-06-29 LAB
BASOPHILS # BLD AUTO: 0.08 K/UL — SIGNIFICANT CHANGE UP (ref 0–0.2)
BASOPHILS NFR BLD AUTO: 1.9 % — SIGNIFICANT CHANGE UP (ref 0–2)
EOSINOPHIL # BLD AUTO: 0.38 K/UL — SIGNIFICANT CHANGE UP (ref 0–0.5)
EOSINOPHIL NFR BLD AUTO: 8.9 % — HIGH (ref 0–6)
HCT VFR BLD CALC: 35.8 % — LOW (ref 39–50)
HGB BLD-MCNC: 12 G/DL — LOW (ref 13–17)
IMM GRANULOCYTES NFR BLD AUTO: 1.2 % — HIGH (ref 0–0.9)
LYMPHOCYTES # BLD AUTO: 0.54 K/UL — LOW (ref 1–3.3)
LYMPHOCYTES # BLD AUTO: 12.6 % — LOW (ref 13–44)
MCHC RBC-ENTMCNC: 31.1 PG — SIGNIFICANT CHANGE UP (ref 27–34)
MCHC RBC-ENTMCNC: 33.5 G/DL — SIGNIFICANT CHANGE UP (ref 32–36)
MCV RBC AUTO: 92.7 FL — SIGNIFICANT CHANGE UP (ref 80–100)
MONOCYTES # BLD AUTO: 0.41 K/UL — SIGNIFICANT CHANGE UP (ref 0–0.9)
MONOCYTES NFR BLD AUTO: 9.6 % — SIGNIFICANT CHANGE UP (ref 2–14)
NEUTROPHILS # BLD AUTO: 2.81 K/UL — SIGNIFICANT CHANGE UP (ref 1.8–7.4)
NEUTROPHILS NFR BLD AUTO: 65.8 % — SIGNIFICANT CHANGE UP (ref 43–77)
NRBC # BLD: 0 /100 WBCS — SIGNIFICANT CHANGE UP (ref 0–0)
PLATELET # BLD AUTO: 92 K/UL — LOW (ref 150–400)
RBC # BLD: 3.86 M/UL — LOW (ref 4.2–5.8)
RBC # FLD: 14.2 % — SIGNIFICANT CHANGE UP (ref 10.3–14.5)
WBC # BLD: 4.27 K/UL — SIGNIFICANT CHANGE UP (ref 3.8–10.5)
WBC # FLD AUTO: 4.27 K/UL — SIGNIFICANT CHANGE UP (ref 3.8–10.5)

## 2024-06-30 LAB
ALBUMIN SERPL ELPH-MCNC: 4.1 G/DL — SIGNIFICANT CHANGE UP (ref 3.3–5)
ALP SERPL-CCNC: 114 U/L — SIGNIFICANT CHANGE UP (ref 40–120)
ALT FLD-CCNC: 13 U/L — SIGNIFICANT CHANGE UP (ref 10–45)
ANION GAP SERPL CALC-SCNC: 22 MMOL/L — HIGH (ref 5–17)
AST SERPL-CCNC: 16 U/L — SIGNIFICANT CHANGE UP (ref 10–40)
BILIRUB SERPL-MCNC: 0.5 MG/DL — SIGNIFICANT CHANGE UP (ref 0.2–1.2)
BUN SERPL-MCNC: 28 MG/DL — HIGH (ref 7–23)
CALCIUM SERPL-MCNC: 8.9 MG/DL — SIGNIFICANT CHANGE UP (ref 8.4–10.5)
CHLORIDE SERPL-SCNC: 102 MMOL/L — SIGNIFICANT CHANGE UP (ref 96–108)
CO2 SERPL-SCNC: 18 MMOL/L — LOW (ref 22–31)
CREAT SERPL-MCNC: 1.4 MG/DL — HIGH (ref 0.5–1.3)
EGFR: 53 ML/MIN/1.73M2 — LOW
GLUCOSE SERPL-MCNC: 109 MG/DL — HIGH (ref 70–99)
LDH SERPL L TO P-CCNC: 209 U/L — SIGNIFICANT CHANGE UP (ref 50–242)
MAGNESIUM SERPL-MCNC: 1.9 MG/DL — SIGNIFICANT CHANGE UP (ref 1.6–2.6)
PHOSPHATE SERPL-MCNC: 3 MG/DL — SIGNIFICANT CHANGE UP (ref 2.5–4.5)
POTASSIUM SERPL-MCNC: 3.6 MMOL/L — SIGNIFICANT CHANGE UP (ref 3.5–5.3)
POTASSIUM SERPL-SCNC: 3.6 MMOL/L — SIGNIFICANT CHANGE UP (ref 3.5–5.3)
PROT SERPL-MCNC: 7.2 G/DL — SIGNIFICANT CHANGE UP (ref 6–8.3)
SODIUM SERPL-SCNC: 143 MMOL/L — SIGNIFICANT CHANGE UP (ref 135–145)
URATE SERPL-MCNC: 7.1 MG/DL — SIGNIFICANT CHANGE UP (ref 3.4–8.8)

## 2024-07-01 DIAGNOSIS — Z51.11 ENCOUNTER FOR ANTINEOPLASTIC CHEMOTHERAPY: ICD-10-CM

## 2024-07-01 DIAGNOSIS — R11.2 NAUSEA WITH VOMITING, UNSPECIFIED: ICD-10-CM

## 2024-07-03 ENCOUNTER — RESULT REVIEW (OUTPATIENT)
Age: 74
End: 2024-07-03

## 2024-07-03 ENCOUNTER — APPOINTMENT (OUTPATIENT)
Dept: HEMATOLOGY ONCOLOGY | Facility: CLINIC | Age: 74
End: 2024-07-03
Payer: MEDICARE

## 2024-07-03 VITALS
BODY MASS INDEX: 23 KG/M2 | SYSTOLIC BLOOD PRESSURE: 183 MMHG | OXYGEN SATURATION: 99 % | DIASTOLIC BLOOD PRESSURE: 75 MMHG | RESPIRATION RATE: 16 BRPM | WEIGHT: 164.91 LBS | HEART RATE: 62 BPM | TEMPERATURE: 97.5 F

## 2024-07-03 DIAGNOSIS — Z51.11 ENCOUNTER FOR ANTINEOPLASTIC CHEMOTHERAPY: ICD-10-CM

## 2024-07-03 LAB
BASOPHILS # BLD AUTO: 0.06 K/UL — SIGNIFICANT CHANGE UP (ref 0–0.2)
BASOPHILS NFR BLD AUTO: 1.2 % — SIGNIFICANT CHANGE UP (ref 0–2)
EOSINOPHIL # BLD AUTO: 0.37 K/UL — SIGNIFICANT CHANGE UP (ref 0–0.5)
EOSINOPHIL NFR BLD AUTO: 7.4 % — HIGH (ref 0–6)
HCT VFR BLD CALC: 36.7 % — LOW (ref 39–50)
HGB BLD-MCNC: 11.9 G/DL — LOW (ref 13–17)
IMM GRANULOCYTES NFR BLD AUTO: 0.4 % — SIGNIFICANT CHANGE UP (ref 0–0.9)
LYMPHOCYTES # BLD AUTO: 0.56 K/UL — LOW (ref 1–3.3)
LYMPHOCYTES # BLD AUTO: 11.2 % — LOW (ref 13–44)
MCHC RBC-ENTMCNC: 30.7 PG — SIGNIFICANT CHANGE UP (ref 27–34)
MCHC RBC-ENTMCNC: 32.4 G/DL — SIGNIFICANT CHANGE UP (ref 32–36)
MCV RBC AUTO: 94.8 FL — SIGNIFICANT CHANGE UP (ref 80–100)
MONOCYTES # BLD AUTO: 0.38 K/UL — SIGNIFICANT CHANGE UP (ref 0–0.9)
MONOCYTES NFR BLD AUTO: 7.6 % — SIGNIFICANT CHANGE UP (ref 2–14)
NEUTROPHILS # BLD AUTO: 3.62 K/UL — SIGNIFICANT CHANGE UP (ref 1.8–7.4)
NEUTROPHILS NFR BLD AUTO: 72.2 % — SIGNIFICANT CHANGE UP (ref 43–77)
NRBC # BLD: 0 /100 WBCS — SIGNIFICANT CHANGE UP (ref 0–0)
PLATELET # BLD AUTO: 103 K/UL — LOW (ref 150–400)
RBC # BLD: 3.87 M/UL — LOW (ref 4.2–5.8)
RBC # FLD: 14.7 % — HIGH (ref 10.3–14.5)
WBC # BLD: 5.01 K/UL — SIGNIFICANT CHANGE UP (ref 3.8–10.5)
WBC # FLD AUTO: 5.01 K/UL — SIGNIFICANT CHANGE UP (ref 3.8–10.5)

## 2024-07-03 PROCEDURE — G2211 COMPLEX E/M VISIT ADD ON: CPT

## 2024-07-03 PROCEDURE — 99215 OFFICE O/P EST HI 40 MIN: CPT

## 2024-07-05 LAB
ALBUMIN SERPL ELPH-MCNC: 4.4 G/DL
ALP BLD-CCNC: 124 U/L
ALT SERPL-CCNC: 15 U/L
ANION GAP SERPL CALC-SCNC: 14 MMOL/L
AST SERPL-CCNC: 20 U/L
BILIRUB SERPL-MCNC: 0.5 MG/DL
BUN SERPL-MCNC: 25 MG/DL
CALCIUM SERPL-MCNC: 8.8 MG/DL
CHLORIDE SERPL-SCNC: 107 MMOL/L
CO2 SERPL-SCNC: 26 MMOL/L
CREAT SERPL-MCNC: 1.42 MG/DL
EGFR: 52 ML/MIN/1.73M2
GLUCOSE SERPL-MCNC: 142 MG/DL
LDH SERPL-CCNC: 205 U/L
MAGNESIUM SERPL-MCNC: 1.7 MG/DL
PHOSPHATE SERPL-MCNC: 3.7 MG/DL
POTASSIUM SERPL-SCNC: 4.2 MMOL/L
PROT SERPL-MCNC: 7.1 G/DL
SODIUM SERPL-SCNC: 147 MMOL/L
URATE SERPL-MCNC: 6.4 MG/DL

## 2024-07-13 ENCOUNTER — APPOINTMENT (OUTPATIENT)
Dept: HEMATOLOGY ONCOLOGY | Facility: CLINIC | Age: 74
End: 2024-07-13

## 2024-07-13 ENCOUNTER — RESULT REVIEW (OUTPATIENT)
Age: 74
End: 2024-07-13

## 2024-07-13 ENCOUNTER — APPOINTMENT (OUTPATIENT)
Dept: INFUSION THERAPY | Facility: HOSPITAL | Age: 74
End: 2024-07-13

## 2024-07-13 LAB
ALBUMIN SERPL ELPH-MCNC: 4.2 G/DL — SIGNIFICANT CHANGE UP (ref 3.3–5)
ALP SERPL-CCNC: 121 U/L — HIGH (ref 40–120)
ALT FLD-CCNC: 12 U/L — SIGNIFICANT CHANGE UP (ref 10–45)
ANION GAP SERPL CALC-SCNC: 13 MMOL/L — SIGNIFICANT CHANGE UP (ref 5–17)
AST SERPL-CCNC: 16 U/L — SIGNIFICANT CHANGE UP (ref 10–40)
BASOPHILS # BLD AUTO: 0.09 K/UL — SIGNIFICANT CHANGE UP (ref 0–0.2)
BASOPHILS NFR BLD AUTO: 2.1 % — HIGH (ref 0–2)
BILIRUB SERPL-MCNC: 0.5 MG/DL — SIGNIFICANT CHANGE UP (ref 0.2–1.2)
BUN SERPL-MCNC: 19 MG/DL — SIGNIFICANT CHANGE UP (ref 7–23)
CALCIUM SERPL-MCNC: 8.9 MG/DL — SIGNIFICANT CHANGE UP (ref 8.4–10.5)
CHLORIDE SERPL-SCNC: 106 MMOL/L — SIGNIFICANT CHANGE UP (ref 96–108)
CO2 SERPL-SCNC: 25 MMOL/L — SIGNIFICANT CHANGE UP (ref 22–31)
CREAT SERPL-MCNC: 1.51 MG/DL — HIGH (ref 0.5–1.3)
EGFR: 48 ML/MIN/1.73M2 — LOW
EOSINOPHIL # BLD AUTO: 0.41 K/UL — SIGNIFICANT CHANGE UP (ref 0–0.5)
EOSINOPHIL NFR BLD AUTO: 9.4 % — HIGH (ref 0–6)
GLUCOSE SERPL-MCNC: 114 MG/DL — HIGH (ref 70–99)
HCT VFR BLD CALC: 34.6 % — LOW (ref 39–50)
HGB BLD-MCNC: 11.8 G/DL — LOW (ref 13–17)
IMM GRANULOCYTES NFR BLD AUTO: 1.1 % — HIGH (ref 0–0.9)
LDH SERPL L TO P-CCNC: 234 U/L — SIGNIFICANT CHANGE UP (ref 50–242)
LYMPHOCYTES # BLD AUTO: 0.48 K/UL — LOW (ref 1–3.3)
LYMPHOCYTES # BLD AUTO: 11 % — LOW (ref 13–44)
MAGNESIUM SERPL-MCNC: 1.7 MG/DL — SIGNIFICANT CHANGE UP (ref 1.6–2.6)
MCHC RBC-ENTMCNC: 31.6 PG — SIGNIFICANT CHANGE UP (ref 27–34)
MCHC RBC-ENTMCNC: 34.1 G/DL — SIGNIFICANT CHANGE UP (ref 32–36)
MCV RBC AUTO: 92.5 FL — SIGNIFICANT CHANGE UP (ref 80–100)
MONOCYTES # BLD AUTO: 0.49 K/UL — SIGNIFICANT CHANGE UP (ref 0–0.9)
MONOCYTES NFR BLD AUTO: 11.3 % — SIGNIFICANT CHANGE UP (ref 2–14)
NEUTROPHILS # BLD AUTO: 2.83 K/UL — SIGNIFICANT CHANGE UP (ref 1.8–7.4)
NEUTROPHILS NFR BLD AUTO: 65.1 % — SIGNIFICANT CHANGE UP (ref 43–77)
NRBC # BLD: 0 /100 WBCS — SIGNIFICANT CHANGE UP (ref 0–0)
PHOSPHATE SERPL-MCNC: 2.9 MG/DL — SIGNIFICANT CHANGE UP (ref 2.5–4.5)
PLATELET # BLD AUTO: 90 K/UL — LOW (ref 150–400)
POTASSIUM SERPL-MCNC: 3.9 MMOL/L — SIGNIFICANT CHANGE UP (ref 3.5–5.3)
POTASSIUM SERPL-SCNC: 3.9 MMOL/L — SIGNIFICANT CHANGE UP (ref 3.5–5.3)
PROT SERPL-MCNC: 7.3 G/DL — SIGNIFICANT CHANGE UP (ref 6–8.3)
RBC # BLD: 3.74 M/UL — LOW (ref 4.2–5.8)
RBC # FLD: 14.7 % — HIGH (ref 10.3–14.5)
SODIUM SERPL-SCNC: 144 MMOL/L — SIGNIFICANT CHANGE UP (ref 135–145)
URATE SERPL-MCNC: 6.9 MG/DL — SIGNIFICANT CHANGE UP (ref 3.4–8.8)
WBC # BLD: 4.35 K/UL — SIGNIFICANT CHANGE UP (ref 3.8–10.5)
WBC # FLD AUTO: 4.35 K/UL — SIGNIFICANT CHANGE UP (ref 3.8–10.5)

## 2024-07-15 ENCOUNTER — APPOINTMENT (OUTPATIENT)
Dept: CARDIOLOGY | Facility: CLINIC | Age: 74
End: 2024-07-15
Payer: MEDICARE

## 2024-07-15 ENCOUNTER — NON-APPOINTMENT (OUTPATIENT)
Age: 74
End: 2024-07-15

## 2024-07-15 VITALS
SYSTOLIC BLOOD PRESSURE: 178 MMHG | WEIGHT: 164 LBS | DIASTOLIC BLOOD PRESSURE: 76 MMHG | HEART RATE: 68 BPM | OXYGEN SATURATION: 98 % | BODY MASS INDEX: 22.87 KG/M2

## 2024-07-15 VITALS — DIASTOLIC BLOOD PRESSURE: 70 MMHG | SYSTOLIC BLOOD PRESSURE: 170 MMHG | HEART RATE: 68 BPM

## 2024-07-15 DIAGNOSIS — I43 HYPERTENSIVE HEART DISEASE WITH HEART FAILURE: ICD-10-CM

## 2024-07-15 DIAGNOSIS — I50.22 CHRONIC SYSTOLIC (CONGESTIVE) HEART FAILURE: ICD-10-CM

## 2024-07-15 DIAGNOSIS — I11.0 HYPERTENSIVE HEART DISEASE WITH HEART FAILURE: ICD-10-CM

## 2024-07-15 DIAGNOSIS — I45.9 CONDUCTION DISORDER, UNSPECIFIED: ICD-10-CM

## 2024-07-15 DIAGNOSIS — I48.92 UNSPECIFIED ATRIAL FLUTTER: ICD-10-CM

## 2024-07-15 DIAGNOSIS — I25.10 ATHEROSCLEROTIC HEART DISEASE OF NATIVE CORONARY ARTERY W/OUT ANGINA PECTORIS: ICD-10-CM

## 2024-07-15 PROCEDURE — G2211 COMPLEX E/M VISIT ADD ON: CPT

## 2024-07-15 PROCEDURE — 93000 ELECTROCARDIOGRAM COMPLETE: CPT

## 2024-07-15 PROCEDURE — 99214 OFFICE O/P EST MOD 30 MIN: CPT

## 2024-07-18 DIAGNOSIS — C82.20 FOLLICULAR LYMPHOMA GRADE III, UNSPECIFIED, UNSPECIFIED SITE: ICD-10-CM

## 2024-07-27 ENCOUNTER — APPOINTMENT (OUTPATIENT)
Dept: INFUSION THERAPY | Facility: HOSPITAL | Age: 74
End: 2024-07-27

## 2024-07-27 ENCOUNTER — APPOINTMENT (OUTPATIENT)
Dept: HEMATOLOGY ONCOLOGY | Facility: CLINIC | Age: 74
End: 2024-07-27

## 2024-07-29 NOTE — PROGRESS NOTE ADULT - PROBLEM SELECTOR PROBLEM 1
Acute on chronic systolic congestive heart failure What Type Of Note Output Would You Prefer (Optional)?: Standard Output Hpi Title: Evaluation of Skin Lesions Additional History: Pt. concerned with spot on chest that is itchy, as well as spot on left calf.

## 2024-08-10 ENCOUNTER — RESULT REVIEW (OUTPATIENT)
Age: 74
End: 2024-08-10

## 2024-08-10 ENCOUNTER — APPOINTMENT (OUTPATIENT)
Dept: INFUSION THERAPY | Facility: HOSPITAL | Age: 74
End: 2024-08-10

## 2024-08-10 ENCOUNTER — APPOINTMENT (OUTPATIENT)
Dept: HEMATOLOGY ONCOLOGY | Facility: CLINIC | Age: 74
End: 2024-08-10

## 2024-08-10 LAB
BASOPHILS # BLD AUTO: 0.14 K/UL — SIGNIFICANT CHANGE UP (ref 0–0.2)
BASOPHILS NFR BLD AUTO: 2.5 % — HIGH (ref 0–2)
EOSINOPHIL # BLD AUTO: 0.39 K/UL — SIGNIFICANT CHANGE UP (ref 0–0.5)
EOSINOPHIL NFR BLD AUTO: 7 % — HIGH (ref 0–6)
HCT VFR BLD CALC: 34.9 % — LOW (ref 39–50)
HGB BLD-MCNC: 11.9 G/DL — LOW (ref 13–17)
IMM GRANULOCYTES NFR BLD AUTO: 5.2 % — HIGH (ref 0–0.9)
LYMPHOCYTES # BLD AUTO: 0.87 K/UL — LOW (ref 1–3.3)
LYMPHOCYTES # BLD AUTO: 15.6 % — SIGNIFICANT CHANGE UP (ref 13–44)
MCHC RBC-ENTMCNC: 31.3 PG — SIGNIFICANT CHANGE UP (ref 27–34)
MCHC RBC-ENTMCNC: 34.1 G/DL — SIGNIFICANT CHANGE UP (ref 32–36)
MCV RBC AUTO: 91.8 FL — SIGNIFICANT CHANGE UP (ref 80–100)
MONOCYTES # BLD AUTO: 0.6 K/UL — SIGNIFICANT CHANGE UP (ref 0–0.9)
MONOCYTES NFR BLD AUTO: 10.8 % — SIGNIFICANT CHANGE UP (ref 2–14)
NEUTROPHILS # BLD AUTO: 3.29 K/UL — SIGNIFICANT CHANGE UP (ref 1.8–7.4)
NEUTROPHILS NFR BLD AUTO: 58.9 % — SIGNIFICANT CHANGE UP (ref 43–77)
NRBC # BLD: 0 /100 WBCS — SIGNIFICANT CHANGE UP (ref 0–0)
PLATELET # BLD AUTO: 109 K/UL — LOW (ref 150–400)
RBC # BLD: 3.8 M/UL — LOW (ref 4.2–5.8)
RBC # FLD: 14.8 % — HIGH (ref 10.3–14.5)
WBC # BLD: 5.58 K/UL — SIGNIFICANT CHANGE UP (ref 3.8–10.5)
WBC # FLD AUTO: 5.58 K/UL — SIGNIFICANT CHANGE UP (ref 3.8–10.5)

## 2024-08-14 LAB
ALBUMIN SERPL ELPH-MCNC: 4.2 G/DL — SIGNIFICANT CHANGE UP (ref 3.3–5)
ALP SERPL-CCNC: 109 U/L — SIGNIFICANT CHANGE UP (ref 40–120)
ALT FLD-CCNC: 14 U/L — SIGNIFICANT CHANGE UP (ref 10–45)
ANION GAP SERPL CALC-SCNC: 14 MMOL/L — SIGNIFICANT CHANGE UP (ref 5–17)
AST SERPL-CCNC: 15 U/L — SIGNIFICANT CHANGE UP (ref 10–40)
BILIRUB SERPL-MCNC: 0.4 MG/DL — SIGNIFICANT CHANGE UP (ref 0.2–1.2)
BUN SERPL-MCNC: 28 MG/DL — HIGH (ref 7–23)
CALCIUM SERPL-MCNC: 8.7 MG/DL — SIGNIFICANT CHANGE UP (ref 8.4–10.5)
CHLORIDE SERPL-SCNC: 105 MMOL/L — SIGNIFICANT CHANGE UP (ref 96–108)
CO2 SERPL-SCNC: 24 MMOL/L — SIGNIFICANT CHANGE UP (ref 22–31)
CREAT SERPL-MCNC: 1.49 MG/DL — HIGH (ref 0.5–1.3)
EGFR: 49 ML/MIN/1.73M2 — LOW
GLUCOSE SERPL-MCNC: 103 MG/DL — HIGH (ref 70–99)
LDH SERPL L TO P-CCNC: 201 U/L — SIGNIFICANT CHANGE UP (ref 50–242)
MAGNESIUM SERPL-MCNC: 1.7 MG/DL — SIGNIFICANT CHANGE UP (ref 1.6–2.6)
PHOSPHATE SERPL-MCNC: 2.9 MG/DL — SIGNIFICANT CHANGE UP (ref 2.5–4.5)
POTASSIUM SERPL-MCNC: 4.5 MMOL/L — SIGNIFICANT CHANGE UP (ref 3.5–5.3)
POTASSIUM SERPL-SCNC: 4.5 MMOL/L — SIGNIFICANT CHANGE UP (ref 3.5–5.3)
PROT SERPL-MCNC: 6.4 G/DL — SIGNIFICANT CHANGE UP (ref 6–8.3)
SODIUM SERPL-SCNC: 143 MMOL/L — SIGNIFICANT CHANGE UP (ref 135–145)
URATE SERPL-MCNC: 5.8 MG/DL — SIGNIFICANT CHANGE UP (ref 3.4–8.8)

## 2024-08-21 ENCOUNTER — OUTPATIENT (OUTPATIENT)
Dept: OUTPATIENT SERVICES | Facility: HOSPITAL | Age: 74
LOS: 1 days | Discharge: HOME | End: 2024-08-21

## 2024-08-21 DIAGNOSIS — C82.20 FOLLICULAR LYMPHOMA GRADE III, UNSPECIFIED, UNSPECIFIED SITE: ICD-10-CM

## 2024-08-21 DIAGNOSIS — Z95.0 PRESENCE OF CARDIAC PACEMAKER: Chronic | ICD-10-CM

## 2024-08-21 DIAGNOSIS — C85.90 NON-HODGKIN LYMPHOMA, UNSPECIFIED, UNSPECIFIED SITE: Chronic | ICD-10-CM

## 2024-08-24 ENCOUNTER — APPOINTMENT (OUTPATIENT)
Dept: HEMATOLOGY ONCOLOGY | Facility: CLINIC | Age: 74
End: 2024-08-24

## 2024-08-24 ENCOUNTER — APPOINTMENT (OUTPATIENT)
Dept: INFUSION THERAPY | Facility: HOSPITAL | Age: 74
End: 2024-08-24

## 2024-08-27 RX ORDER — LENALIDOMIDE 5 MG/1
5 CAPSULE ORAL
Qty: 21 | Refills: 0 | Status: ACTIVE | COMMUNITY
Start: 2024-08-27 | End: 1900-01-01

## 2024-09-03 ENCOUNTER — APPOINTMENT (OUTPATIENT)
Dept: ELECTROPHYSIOLOGY | Facility: CLINIC | Age: 74
End: 2024-09-03
Payer: MEDICARE

## 2024-09-03 ENCOUNTER — NON-APPOINTMENT (OUTPATIENT)
Age: 74
End: 2024-09-03

## 2024-09-03 PROCEDURE — 93296 REM INTERROG EVL PM/IDS: CPT

## 2024-09-03 PROCEDURE — 93294 REM INTERROG EVL PM/LDLS PM: CPT

## 2024-09-04 ENCOUNTER — RESULT REVIEW (OUTPATIENT)
Age: 74
End: 2024-09-04

## 2024-09-04 ENCOUNTER — APPOINTMENT (OUTPATIENT)
Dept: HEMATOLOGY ONCOLOGY | Facility: CLINIC | Age: 74
End: 2024-09-04

## 2024-09-04 VITALS
RESPIRATION RATE: 16 BRPM | DIASTOLIC BLOOD PRESSURE: 75 MMHG | TEMPERATURE: 97.3 F | HEART RATE: 68 BPM | OXYGEN SATURATION: 99 % | WEIGHT: 158.73 LBS | BODY MASS INDEX: 22.14 KG/M2 | SYSTOLIC BLOOD PRESSURE: 173 MMHG

## 2024-09-04 DIAGNOSIS — C82.20 FOLLICULAR LYMPHOMA GRADE III, UNSPECIFIED, UNSPECIFIED SITE: ICD-10-CM

## 2024-09-04 LAB
BASOPHILS # BLD AUTO: 0.03 K/UL — SIGNIFICANT CHANGE UP (ref 0–0.2)
BASOPHILS NFR BLD AUTO: 0.4 % — SIGNIFICANT CHANGE UP (ref 0–2)
EOSINOPHIL # BLD AUTO: 0.36 K/UL — SIGNIFICANT CHANGE UP (ref 0–0.5)
EOSINOPHIL NFR BLD AUTO: 5.2 % — SIGNIFICANT CHANGE UP (ref 0–6)
HCT VFR BLD CALC: 35.9 % — LOW (ref 39–50)
HGB BLD-MCNC: 12.1 G/DL — LOW (ref 13–17)
IMM GRANULOCYTES NFR BLD AUTO: 0.4 % — SIGNIFICANT CHANGE UP (ref 0–0.9)
LYMPHOCYTES # BLD AUTO: 0.48 K/UL — LOW (ref 1–3.3)
LYMPHOCYTES # BLD AUTO: 6.9 % — LOW (ref 13–44)
MCHC RBC-ENTMCNC: 31.4 PG — SIGNIFICANT CHANGE UP (ref 27–34)
MCHC RBC-ENTMCNC: 33.7 G/DL — SIGNIFICANT CHANGE UP (ref 32–36)
MCV RBC AUTO: 93.2 FL — SIGNIFICANT CHANGE UP (ref 80–100)
MONOCYTES # BLD AUTO: 0.42 K/UL — SIGNIFICANT CHANGE UP (ref 0–0.9)
MONOCYTES NFR BLD AUTO: 6.1 % — SIGNIFICANT CHANGE UP (ref 2–14)
NEUTROPHILS # BLD AUTO: 5.6 K/UL — SIGNIFICANT CHANGE UP (ref 1.8–7.4)
NEUTROPHILS NFR BLD AUTO: 81 % — HIGH (ref 43–77)
NRBC # BLD: 0 /100 WBCS — SIGNIFICANT CHANGE UP (ref 0–0)
PLATELET # BLD AUTO: 119 K/UL — LOW (ref 150–400)
RBC # BLD: 3.85 M/UL — LOW (ref 4.2–5.8)
RBC # FLD: 15 % — HIGH (ref 10.3–14.5)
WBC # BLD: 6.92 K/UL — SIGNIFICANT CHANGE UP (ref 3.8–10.5)
WBC # FLD AUTO: 6.92 K/UL — SIGNIFICANT CHANGE UP (ref 3.8–10.5)

## 2024-09-04 PROCEDURE — 99215 OFFICE O/P EST HI 40 MIN: CPT

## 2024-09-04 PROCEDURE — G2211 COMPLEX E/M VISIT ADD ON: CPT

## 2024-09-07 ENCOUNTER — RESULT REVIEW (OUTPATIENT)
Age: 74
End: 2024-09-07

## 2024-09-07 ENCOUNTER — APPOINTMENT (OUTPATIENT)
Dept: HEMATOLOGY ONCOLOGY | Facility: CLINIC | Age: 74
End: 2024-09-07

## 2024-09-07 ENCOUNTER — APPOINTMENT (OUTPATIENT)
Dept: INFUSION THERAPY | Facility: HOSPITAL | Age: 74
End: 2024-09-07

## 2024-09-07 LAB
ALBUMIN SERPL ELPH-MCNC: 4.2 G/DL — SIGNIFICANT CHANGE UP (ref 3.3–5)
ALP SERPL-CCNC: 120 U/L — SIGNIFICANT CHANGE UP (ref 40–120)
ALT FLD-CCNC: 10 U/L — SIGNIFICANT CHANGE UP (ref 10–45)
ANION GAP SERPL CALC-SCNC: 12 MMOL/L — SIGNIFICANT CHANGE UP (ref 5–17)
AST SERPL-CCNC: 15 U/L — SIGNIFICANT CHANGE UP (ref 10–40)
BILIRUB SERPL-MCNC: 0.5 MG/DL — SIGNIFICANT CHANGE UP (ref 0.2–1.2)
BUN SERPL-MCNC: 24 MG/DL — HIGH (ref 7–23)
CALCIUM SERPL-MCNC: 8.7 MG/DL — SIGNIFICANT CHANGE UP (ref 8.4–10.5)
CHLORIDE SERPL-SCNC: 105 MMOL/L — SIGNIFICANT CHANGE UP (ref 96–108)
CO2 SERPL-SCNC: 27 MMOL/L — SIGNIFICANT CHANGE UP (ref 22–31)
CREAT SERPL-MCNC: 1.59 MG/DL — HIGH (ref 0.5–1.3)
EGFR: 46 ML/MIN/1.73M2 — LOW
GLUCOSE SERPL-MCNC: 96 MG/DL — SIGNIFICANT CHANGE UP (ref 70–99)
LDH SERPL L TO P-CCNC: 228 U/L — SIGNIFICANT CHANGE UP (ref 50–242)
MAGNESIUM SERPL-MCNC: 1.6 MG/DL — SIGNIFICANT CHANGE UP (ref 1.6–2.6)
PHOSPHATE SERPL-MCNC: 2.5 MG/DL — SIGNIFICANT CHANGE UP (ref 2.5–4.5)
POTASSIUM SERPL-MCNC: 3.3 MMOL/L — LOW (ref 3.5–5.3)
POTASSIUM SERPL-SCNC: 3.3 MMOL/L — LOW (ref 3.5–5.3)
PROT SERPL-MCNC: 7 G/DL — SIGNIFICANT CHANGE UP (ref 6–8.3)
SODIUM SERPL-SCNC: 144 MMOL/L — SIGNIFICANT CHANGE UP (ref 135–145)
URATE SERPL-MCNC: 6.8 MG/DL — SIGNIFICANT CHANGE UP (ref 3.4–8.8)

## 2024-09-09 DIAGNOSIS — Z51.11 ENCOUNTER FOR ANTINEOPLASTIC CHEMOTHERAPY: ICD-10-CM

## 2024-09-09 DIAGNOSIS — R11.2 NAUSEA WITH VOMITING, UNSPECIFIED: ICD-10-CM

## 2024-09-13 NOTE — HISTORY OF PRESENT ILLNESS
[Disease:__________________________] : Disease: [unfilled] [Treatment Protocol] : Treatment Protocol [de-identified] : Initial Presentation:  71 yo with MHx significant for Atrial flutter (on Apixaban), HTN, here for further evaluation of low-level neutrophilia (since 1/2022) and lymphadenopathy. Previously NHL (around 8115-1881?). He received chemotherapy in 2004. 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 In 2010 he went to [facility] [place] and was treated for reoccurrence and was treated with bendamustine. He reports that he has had areas of swelling in his neck on and off for about 2 years which was mostly undergoing workup with his endocrinologist. In the last few months he has noticed increasing size of his left cervical LNs and a right nodule that caused swelling of his face, which has now spontaneously decreased in size significantly.  Today he reports he feels well outside of weight loss. He denies any other constitutional complaints. He weighed 192 lbs in 10/2021 which was down to 183 lbs in December 12/2021(2 months later) which has further decreased down to 179 lbs today. He has not felt himself masses outside of his neck region. On 5/14/22, he went for US duplex of his left lower extremity due to a "tangerine size lump" on the back of his left thigh, which noted a 4.4 cm hypoechoic mass in his left inguinal region without commenting on his perceived posterior thigh mass. Also, on 5/2/22 he underwent US of his thyroid and parathyroid glands where they noticed bilateral cervical lymph nodes with the largest on the left side measuring 2.1 x 1.6 x 1.9 cm and a right level 1 LN measuring 2.2 x 1.2 x 2.3 cm. They saw 3 lymph nodes on the left side and one discrete LN on the right.   He also recently just finished a 14 day course of Levofloxacin for an uncomplicated phenomena. He was having a dry cough and underwent imaging as an outpatient which found mild left and right pleural effusions, areas of consolidation on the right and retrocardiac airspace involvement (performed 5/2/22). He now feels better without infectious complaints.  In addition, he is currently anemic. He last had a colonoscopy in his recent memory. He had bleeding hemorrhoids last year treated with OTC medications. He denies any tick bites recently.   He also has MGUS with 3 separate monoclonal gammopathies I suspect is related to his NHL. He has M Judd 1 showing IgG Lambda at 0.3 g/dl, M Judd 2 showing IgG Kappa at 0.2 g/dl, M Judd 3 showing IgM Lambda (unable to quantitate). There is no suspicion for myeloma or waldenstroms at this time and his M-spikes will be monitored. He has no elevation in kappa/ lambda FLC ration, but individual light chains are both elevated, kappa at 10.77 mg/dL and lambda at 6.58 mg/dL.  Social Hx - He is currently retired. He used to be an . - No significant environmental exposure to chemicals - Lives by himself and his wife lives in [place]. - Former smoker. He smoked for 10 years less than 1 pack a day. He quit 20 years ago  Family hx - Two brothers non Hodgkins lymphoma. At least one required chemotherapy. sister had cervical cancer first diagnosed 2007 and then 3 years later with more cancer discovered - Mother and father passed away from heart disease and diabetes.  ======================================================= Interval Hx update:  He underwent PET-CT in Aug 2022 which showed FDG avid lymph nodes in the left cervical, bilateral supraclavicular regions, and chest, and a few FDG avid abdominal lymph nodes, intramuscular masses in the left posterior chest and left upper thigh, scattered FDG avid subcutaneous soft tissue/nodules with trace right pleural effusion, moderate left pleural effusion, loculated fluid in the right middle lobe. Moderate abdominal and pelvic ascites. Subcutaneous edema in the lower abdominal wall extending into the imaged bilateral thighs. Splenomegaly with mild FDG avidity, suspicious for lymphomatous involvement.  Prior to the PET-CT, he underwent biopsy of both a cervical lymph node and a left axilla lymph node. 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 There were areas of > 20 SUV on the PET-CT, repeat whole lymph node biopsy was requested to confirm suspected diagnosis of Grade 3B FL or transformed large cell lymphoma. S/p excisional biopsy of back lesion on 9/7/22 which showed recurrent DLBCL.  LP 9/26/22: protein elevated at 61, LDH 27, neutrophils 0, lymphocytes 33, monocytes 67, RBC 5. Oligoclonal bands negative. The flow cytometry failed due to insufficient cells. Recommended continued IT MTX therapy x 4 total cycles. He had an interim PET-CT performed on 2/1/23 (3 months after discontinuation of O-miniCHOP in the middle of his therapy - received 3/6 cycles) which showed possible persistent disease including a small, residual focus of increased FDG activity in the left level II region, likely corresponding to a difficult to delineate lymph node, is decreased in size and metabolism (SUV 3.5; image 33; previous SUV 16.1 and skin thickening and subcutaneous nodules, right lower anterior and lateral abdominal wall, slightly more extensive and similar in metabolism (SUV 7.1; image 170; previous SUV 5.5). Discontinued chemoimmunotherapy with Obi-miniCHOP in Nov 2022 due to treatment complications, hospitalizations, poor PS. He was subsequently started on Tafasitamab (anti-CD19) + lenalidomide since 3/30/23. Lenalidomide dose reduced from 20 mg to 15 mg due to episodes of severe neutropenia.  ======================================================= Care Providers:  PMD/ Geriatrician: Dr Allison; Tel: [phone] Endo: Dr Carter Vigil Cardiologist: Dr. Don [phone] fax [phone]  ======================================================= Contacts:  Vonnie (daughter): [phone] - takes all healthcare related calls  ======================================================= [de-identified] : Fine Needle Aspiration Report - Auth (Verified)\par  Specimen(s) Submitted\par  1. AXILLA, LEFT, US GUIDED CORE BIOPSY AND FNA\par  2. LYMPH NODE, CERVICAL, LEFT POSTERIOR, US GUIDED CORE BIOPSY AND FNA\par  \par  \par  Final Diagnosis\par  1. AXILLA, LEFT, US GUIDED CORE BIOPSY AND FNA\par  POSITIVE FOR MALIGNANT CELLS.\par  B-cell lymphoma of follicular center origin, most consistent with\par  follicular lymphoma, grade 3A.  See note.\par  \par  Fine Needle Aspiration Addendum Report - Auth (Verified)\par  \par  Addendum\par  2. LYMPH NODE, CERVICAL, LEFT POSTERIOR, US GUIDED CORE BIOPSY AND FNA\par  POSITIVE FOR MALIGNANT CELLS.\par  B-cell lymphoma of follicular center origin with high proliferation index.\par  See note.\par  \par  Note:\par  The neoplastic B cells demonstrate a follicular center B-cell phenotype with MUM-1 co-expression and a high proliferation index.  Follicular dendritic meshwork is present in most areas of the biopsy, consistent\par  with follicular lymphoma.  However, there is one focal area with increased larger cells and lack of follicular dendritic meshwork. Therefore, a high grade component can not be excluded.  If clinically indicated, suggest excisional biopsy for definitive classification.\par  \par  Immunohistochemical stains   (CD3, CD5, CD10, CD20, CD21, CD23, CD30, BCL-6, BCL-2, cyclinD1, ki-67, c-MYC, PAX-5, MUM-1, p53, ISAURO) were performed on block 2C.  The neoplastic cells are positive for CD20, PAX-5, BCL-6, BCL-2, MUM-1 (partial), dim p53; negative CD5, CD10, CD30, cyclinD1, ISAURO.  CD21 and CD23 highlight follicular dendritic meshwork in most areas with focal absence of follicular dendritic meshwork. \par  Ki-67 proliferation index is approximately 60 to 70%.  C-MYC stains approximately 20 to 30% of cells.  CD3 and CD5 highlight some T-cells in the background. [de-identified] : 6/14/22 FNA of Left axilla LN: FLUORESCENCE IN-SITU HYBRIDIZATION (FISH)\par  1. No evidence of MYC Rearrangement\par  2. No evidence of BCL2-IGH [translocation t(14;18)] gene rearrangement\par  3. 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 [de-identified] : 5/18/22: Initial Visit.  6/20/22: Follow-up. Patient feels well overall. Lymphoma labs and left axilla LN biopsy revealed grade 3A follicular lymphoma, IgG Lambda and Kappa MGUS. He reports lymph nodes have been stable. Heart function is stable. He denies having any recent fevers, chills, nausea. No weight changes.  8/22/22: Follow-up. Patient feels well overall. Awaiting for biopsy results of lymph nodes in his back. He does not have pain in the left back. The left side of his neck gives him discomfort. He has never received chemotherapy nor antibody treatment in the past. No fevers, chills, night sweats. No new noticeable masses  Good appetite, lost 7 lbs (lost a lot of fluid weight due to diuretics).   10/17/22: Follow up. In the interim, he was hospitalized at [facility] twice (8/27-9/12 & 9/16-10/3). 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 Patient was treated with rasburicase, but developed rasburicase-triggered G6PD hemolysis. Also found to be in acute exacerbation of HFrEF and have a prolonged QTc (likely medication-induced). During hospital stay was found to have altered mental status. 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 In mid-September patient was hospitalized for Encephalopathy (+Rhinovirus/enterovirus) unrelated to the Lymphoma. Today, he feels at baseline. Notes resolution of cervical LNs, and back lesions since starting treatment. Patient denies fever, chills, night sweats, back pain, abdominal pain, chest pain, or shortness of breath. Fair appetite, weight loss due to prolonged hospitalizations.  11/17/22: Follow-up. On 10/28/22 at home was found down by his daughter found to have fever, STEPHEN and AMS and was admitted for acute metabolic encephalopathy with sepsis 2/2 pneumonia s/p 7 days zosyn with improvement. Today he presents from rehab with his daughter. He is not feeling well. He feels that he is weak and fatigued. He has been doing PT / walking around the facility. He reads when awake. He has a poor appetite, but his family is bringing in food that he likes. No fevers, chills, night sweats. No new lumps or masses.  2/6/23: Follow-up. He was readmitted [facility] 1/9/23 to 1/20/23 for malaise and cytopenias. In December 2022 he had pneumonia and COVID19 for which there has been a long recovery. Due to these complications, his treatment with chemoimmunotherapy (O-miniCHOP) was discontinued. He is currently staying in a rehab to improve his performance status. He overall feels well today and reports feeling much improved relative to Dec 2022. He eats 3 meals a day, but prefers to eat late at night. His appetite is good and his daughter brings a lot of food supplementation for him. He continues to lose weight however. No other recent illnesses. He had a PET-CT last week.  3/13/23: Followup. He has not yet rec'd revlimid but is now approved for free drug. Continues to have issues gaining weight, and reports a very good appetite. He has not lost weight at least. He also has been having right foot pain between the ankle and toes since the night of 3/6/23. He also has a rash on toes 1-3. He continues to have right lateral abdominal itchiness around a site of swelling / lump. This has not changed in the past month. He is now back home. He is able to ambulate with a walker. He is not limited by dyspnea. He had edema in the rehab which has resolved. Per family he has also been having intermittent periods where he is not himself and he becomes aggressive toward family members. This has been an ongoing issue for sometime   3/30/23: Follow-up. Today is Cycle 1 Day 1 of Tafa. He has not yet rec'd revlimid (expected delivery today); is approved for free drug via [employer]. Reports intentional weight gain over the past couple of weeks and ambulating with a walker. Continues to have right foot pain between the ankle and toes since the night of 3/6/23, and have right lateral abdominal itchiness around a site of swelling / lump. This has not changed in the past month. He is now back home. Per family he has intermittent periods where he is not himself and he becomes aggressive toward family members.   4/6/23: Follow-up. Today is Cycle 1 Day 8 of Tafasitamab. Did receive revlimid on day 1 and has been tolerating Revlimid well. Denies any abdominal issues, continues to have a good appetite. Ambulating with a walker but continues to have right foot pain, and have right lateral abdominal itchiness around a site of swelling / lump. Per family he has intermittent periods where he is not himself and he becomes aggressive toward family members.   4/13/23: Follow-up. Today is Cycle 1 Day 15 of Tafasitamab. He is tolerating the regimen well. Denies any abdominal issues, continues to have a good appetite. Ambulating with a walker but continues to have right foot pain, and have right lateral abdominal itchiness around a site of swelling / lump. Per family he has intermittent periods where he is not himself and he becomes aggressive toward family members.   4/20/23: Follow-up. Today is Cycle 1 Day 22. Today he reports feeling well, but has noticed his right leg / foot is swollen and associated with shoe tightness. He reports that he has been taking his apixaban which he takes for cardiac reasons. His ANC is 0, but denies any mucositis, or other infectious issues. No new lumps or masses. His right abd mass is decreasing in size.  4/27/23: Follow-up. Today is Cycle 1 Day 29. After receiving zarxio he developed a facial rash which is now self resolved. He remains off of Revlimid due to neutropenia. Has the mediport insertion scheduled for May 9th. Today he reports feeling well, his right leg / foot remains stable and swollen; associated with shoe tightness. US Duplex (4/20/23) negative for DVT. He reports that he has been taking his apixaban which he takes for cardiac reasons. Denies any mucositis, or other infectious issues. No new lumps or masses. His right abd mass is decreasing in size.  5/4/23: Follow-up. Today is Cycle 1 Day 36; has been unable to receive Tafa due to peripheral access limitation therefore cycle 2 has not been counted. He has restarted Revlimid at a lowered dose of 15mg due to neutropenia, tolerating well without neutropenia thus far. He has the mediport insertion scheduled for May 9th. Today he reports feeling well, his right leg / foot remains stable and swollen;US Duplex (4/20/23) negative for DVT. Remains on apixaban which he takes for cardiac reasons. Denies any mucositis, or other infectious issues. No new lumps or masses. His right abd mass is no longer palpable.  5/25/23: Follow-up. Today is Cycle 2 Day 15. He has a mediport in place now and is able to receive the Tafasitamab without issue, tolerating Revlimid well at a lowered dose of 15mg due to neutropenia. Today he reports feeling well, his intermittent right leg swelling is much improved. Has been hydrating well recently, given elevated BUN. Remains on apixaban which he takes for cardiac reasons. Denies any bleeding, mucositis, masses/lumps, fever, chills, or night sweats. Good appetite.  6/19/23: Follow-up. Today is Cycle 3 Day 12 of Tafa + Revlimid. Due to neutropenia about 2 weeks ago he was again advised to hold the Revlimid until further notice. He has been having swelling in his hands, left > right with significant pain (gout-like) in the left thumb. He developed a sore throat yesterday at home and was using honey to help soothe. Today he only has a sore throat when swallowing liquids. He was aslo having chills, confused and having visual hallucinations.  He denies chest pain or shortness of breath. He ambulated into the center today, per daughter he has been walking much slower. He denies any issues with bowel or urinary function. He reports he is hydrating a couple times a day per daughter up to 500 mL a day. He is still off revlimid. Blood pressure rechecked 86/58.  7/26/23: Follow-up. He feels well today. He has noticed all of his prior lymphadenopathy disappear. He is not noticing any new lumps or masses. He denies constitutional issues. His neutrophils remain in the severely low range, however he has not developed any infections. He has been taking abx ppx. We will hold tafa as it can also negatively affect neutrophils. He has a left epicondylar mass that he states he has had for a very long time (years), which we will watch.  8/16/23: Follow-up. Mr Skaggs presents today with his daughter. He just arrived from seeing a neurologist, Dr Childers regarding his memory issues and occasional personality changes. Vonnie his daughter states that the past month has been better. He also has been evaluated at the [facility] clinic in [place] for his past exposures and is awaiting word on the status of that. His left elbow area swelling has not changed. He has not noticed any new lumps or masses. No constitutional issues. His neutrophils have since recovered and were likely low primarily due to Monjuvi (tafa). ROS was negative for other issues.  9/6/23: Follow up. Patient reports feeling well today. He gained 8lbs since last visit. His WBC 5.29, Hb 10.4, Plt 98 today. No concerning signs of symptoms today.  10/18/23: Follow-up. Today is Cycle 6 Day 26. He feels well today. He has not noticed any new lumps or masses. All prior masses have resolved. He has not had any new infections or constitutional issues. He remains active at home without health related limitations.  12/6/23: Follow-up. The prior 2 treatments (2 weeks) have been held due to asymptomatic neutropenia. His ANC is starting to recover today. He has been off Revlimid for over 2 weeks. He has not received Tafa since 11/4/23. Will aim for next dose 12/30/23 with a CBC check 12/27/23. No active issues today. No lumps or masses per hx or exam. No recent illnesses, fevers, constitutional issues.  2/5/24: Follow-up. He saw Dr Munguia (Geriatrics) earlier today to discuss general medical issues. His BP was elevated and was repeated today here in the oncology office at 158/71, noted Hx of white coat syndrome. He feels overall well. No new issues. He denies any lumps and masses. All former masses have resolved. He has not had any recent infections. His ANC has remained >1000 since Dec 28, 2023. No constitutional issues. He is tolerating therapy without any side effects currently.  7/3/24: Follow-up. Saw [facility] clinic, thought to have unspecified dementia, no treatments initiated. Patient feels well and reports good performance status. Tolerating tafasitamab infusions (occ headaches), reports good adherence to Revlimid plan. Currently on Cycle 14. Plan for monthly Tafa from cycle 15 onward.  9/4/24: Follow-up. Today is cycle 16 day 26. Pt states that he has some oral discomfort likely from dentures shifting. Of note, patient has been sleeping in dentures regularly. Denies. Denies fevers, chills, and LAD.  A comprehensive review of systems was performed including constitutional, eyes, ENT, cardiovascular, respiratory, gastrointestinal, genitourinary, musculoskeletal, integumentary, neurological, psychiatric and hematologic / lymphatic. All pertinent positives are included in the H&P under interval history above and the remaining review of systems listed are negative.   ==================================================== Treatment Hx:  Obinutuzumab-mini-COEP q21 days x 3 cycles (incomplete due to toxicities and patient preference to switch to more tolerable regimen) (Obinutuzumab modified mini-COEP: 400 mg/m cyclophosphamide, Etoposide (40% dose reduced to 30 mg/m2 IV on day 1 and 60 mg/m2 PO on days 2 and 3 of each cycle), and 2 mg vincristine (flat dose) on day 1 of each cycle, and 100 mg prednisone on days 1-5. Modified from Man et al 2009 Blood "R-CHOP with Etoposide Substituted for Doxorubicin (R-CEOP): Excellent Outcome in Diffuse Large B Cell Lymphoma for Patients with a Contraindication to Anthracyclines." with mini- dosing for elderly patients) - Cycle 1 Day 1 given 9/2/22 - third dose of Obinutuzumab held due to AMS, requiring admission to [facility] found to have enterovirus infection - Cycle 2 Day 1 given 9/30/22 - Given as an inpatient at [facility] - Cycle 3 Day 1 given 10/21/22 - Complicated by pneumonia, requiring admission and rehab placement  Tafasitamab plus Revlimid (changed therapy due to patient and family's wishes due to intolerable toxicities), On 28 day cycles. - Cycle 1 Day 1 on 3/30/23 (HELD revlimid briefly starting 4/20/23 due to neutropenia) - Cycle 2 Day 1 on 5/11/23 (HELD Revlimid since end of May, not yet restarted due to prolonged neutropenia) - Cycle 3 Day 1 on 6/8/23 (On 6/29/23 during cycle 3 he had experienced hypotension down to 86/58 in setting of sore throat and chills, confusion with visual hallucinations in the setting of neutropenia and was sent to [facility] ED, delayed tx 1 week) - Cycle 4 day 1 on 7/15/23 - Cycle 5 Day 1 was HELD on 8/12/23 for persistent severe neutropenia without infection / complications, delayed until 8/26/23 - Cycle 6 Day 1 on 9/23/23 - Cycle 7 Day 1 on 10/21/23 (HELD treatment related to severe neutropenia) - Cycle 8 Day 1 now planned for 12/30/23 (was initially on 11/18/23, however the entire cycle has been held due to severe neutropenia) - Cycle 9 Day 1 on 1/27/24 - Cycle 10 Day 1 on 2/24/24 - Cycle 11 Day 1 on 3/23/24 - Cycle 12 Day 1 on 4/20/24 - Cycle 13 Day 1 on 5/18/24 - Cycle 14 Day 1 on 6/15/24 - Cycle 15 Day 1 on 7/13/24 (changed to every 4 weeks Tafa) - Cycle 16 Day 1 on 8/10/24 - Cycle 17 Day 1 on 9/7/24  ==================================================== [FreeTextEntry1] : Tafasitamab (monthly) plus Revlimid 20 mg daily 21/28 days.

## 2024-09-13 NOTE — REASON FOR VISIT
[Follow-Up Visit] : a follow-up visit for [FreeTextEntry2] : Grade III Follicular Lymphoma, Hx of DLBCL

## 2024-09-13 NOTE — ASSESSMENT
[Palliative] : Goals of care discussed with patient: Palliative [FreeTextEntry1] : 73 yo male with PMHx significant for follicular lymphoma with DLBCL (tx with R-CHOP in 2003, with relapse in 2009, treated with BR) with his initial presentation at [facility] with multiple areas of palpable lymphadenopathy with follicular lymphoma grade IIIA (IPI score 3) in the setting of a steady decline in weight > 20 lbs in the previous 6 months without other constitutional complaints. 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  Due to symptoms / clinical picture of high grage FL, he was started on therapy. He completed 3 cycles of obinutuzumab + mini-CHOP, however this was d/c'd due to intolerability / complications. He is now on second-line therapy with Tafasitamab (anti-CD19) + lenalidomide (15 mg 21/28 days) since 3/30/23. Due to severe neutropenia he had multiple treatment delays (discussed above in Treatment Hx). Tafasitamab every 4 weeks since cycle 15. His CBC has remained stable without recurrent severe neutropenia. He remains with no evidence of disease. Today Cycle 16 Day 26 of Tafasitamab + lenalidomide.  Plan: - Repeat Lymphoma labs once every 4 weeks during treatment visit, during physician visit he only needs Pre-F (fingerstick for CBC) - Space Tafasitamab 12 mg/kg to every 4 weeks, will continue until progression. Will continue Revlimid. HOLD for ANC <500. - VTE ppx while on Revlimid; on Apixaban for cardiac issues, Continue 5 mg q12hrs. No bleeding or bruising issues. - Continue Allopurinol 100 mg daily given hx of TLS and hyperuricemia, has G6PD deficiency. UA is usually in the range 6-8., continue to check once a month Uric acid levels. - Antiviral ppx: Acyclovir 400 mg BID - Behavioral Health Issues: Has been improving. Established care with Dr Childers (neuro-onc) and Geriatrics. Suspect mild dementia. No neurologic or other active issues identified. Has been stable as of recent. - HCM: HTN, takes metoprolol succinate 25 mg once a day, has possible white coat syndrome, gets better over the course of the visit. - Follow up every 2 months, combining treatment visits and clinic visits during visit months  ___ I personally have spent a total of 40 minutes of time on the date of this encounter reviewing test results, documenting findings, providing education, coordinating care and directly consulting with the patient and/or designated family member.

## 2024-09-13 NOTE — ASSESSMENT
[Palliative] : Goals of care discussed with patient: Palliative [FreeTextEntry1] : 73 yo male with PMHx significant for follicular lymphoma with DLBCL (tx with R-CHOP in 2003, with relapse in 2009, treated with BR) with his initial presentation at [facility] with multiple areas of palpable lymphadenopathy with follicular lymphoma grade IIIA (IPI score 3) in the setting of a steady decline in weight > 20 lbs in the previous 6 months without other constitutional complaints. 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  Due to symptoms / clinical picture of high grage FL, he was started on therapy. He completed 3 cycles of obinutuzumab + mini-CHOP, however this was d/c'd due to intolerability / complications. He is now on second-line therapy with Tafasitamab (anti-CD19) + lenalidomide (15 mg 21/28 days) since 3/30/23. Due to severe neutropenia he had multiple treatment delays (discussed above in Treatment Hx). Tafasitamab every 4 weeks since cycle 15. His CBC has remained stable without recurrent severe neutropenia. He remains with no evidence of disease. Today Cycle 16 Day 26 of Tafasitamab + lenalidomide.  Plan: - Repeat Lymphoma labs once every 4 weeks during treatment visit, during physician visit he only needs Pre-F (fingerstick for CBC) - Space Tafasitamab 12 mg/kg to every 4 weeks, will continue until progression. Will continue Revlimid. HOLD for ANC <500. - VTE ppx while on Revlimid; on Apixaban for cardiac issues, Continue 5 mg q12hrs. No bleeding or bruising issues. - Continue Allopurinol 100 mg daily given hx of TLS and hyperuricemia, has G6PD deficiency. UA is usually in the range 6-8., continue to check once a month Uric acid levels. - Antiviral ppx: Acyclovir 400 mg BID - Behavioral Health Issues: Has been improving. Established care with Dr Childers (neuro-onc) and Geriatrics. Suspect mild dementia. No neurologic or other active issues identified. Has been stable as of recent. - HCM: HTN, takes metoprolol succinate 25 mg once a day, has possible white coat syndrome, gets better over the course of the visit. - Follow up every 2 months, combining treatment visits and clinic visits during visit months  ___ I personally have spent a total of 40 minutes of time on the date of this encounter reviewing test results, documenting findings, providing education, coordinating care and directly consulting with the patient and/or designated family member.

## 2024-09-13 NOTE — HISTORY OF PRESENT ILLNESS
[Disease:__________________________] : Disease: [unfilled] [Treatment Protocol] : Treatment Protocol [de-identified] : Initial Presentation:  71 yo with MHx significant for Atrial flutter (on Apixaban), HTN, here for further evaluation of low-level neutrophilia (since 1/2022) and lymphadenopathy. Previously NHL (around 8044-5434?). He received chemotherapy in 2004. 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 In 2010 he went to [place] and was treated for reoccurrence and was treated with bendamustine. He reports that he has had areas of swelling in his neck on and off for about 2 years which was mostly undergoing workup with his endocrinologist. In the last few months he has noticed increasing size of his left cervical LNs and a right nodule that caused swelling of his face, which has now spontaneously decreased in size significantly.  Today he reports he feels well outside of weight loss. He denies any other constitutional complaints. He weighed 192 lbs in 10/2021 which was down to 183 lbs in December 12/2021(2 months later) which has further decreased down to 179 lbs today. He has not felt himself masses outside of his neck region. On 5/14/22, he went for US duplex of his left lower extremity due to a "tangerine size lump" on the back of his left thigh, which noted a 4.4 cm hypoechoic mass in his left inguinal region without commenting on his perceived posterior thigh mass. Also, on 5/2/22 he underwent US of his thyroid and parathyroid glands where they noticed bilateral cervical lymph nodes with the largest on the left side measuring 2.1 x 1.6 x 1.9 cm and a right level 1 LN measuring 2.2 x 1.2 x 2.3 cm. They saw 3 lymph nodes on the left side and one discrete LN on the right.   He also recently just finished a 14 day course of Levofloxacin for an uncomplicated phenomena. He was having a dry cough and underwent imaging as an outpatient which found mild left and right pleural effusions, areas of consolidation on the right and retrocardiac airspace involvement (performed 5/2/22). He now feels better without infectious complaints.  In addition, he is currently anemic. He last had a colonoscopy in his recent memory. He had bleeding hemorrhoids last year treated with OTC medications. He denies any tick bites recently.   He also has MGUS with 3 separate monoclonal gammopathies I suspect is related to his NHL. He has M Judd 1 showing IgG Lambda at 0.3 g/dl, M Judd 2 showing IgG Kappa at 0.2 g/dl, M Judd 3 showing IgM Lambda (unable to quantitate). There is no suspicion for myeloma or waldenstroms at this time and his M-spikes will be monitored. He has no elevation in kappa/ lambda FLC ration, but individual light chains are both elevated, kappa at 10.77 mg/dL and lambda at 6.58 mg/dL.  Social Hx - He is currently retired. He used to be an . - No significant environmental exposure to chemicals - Lives by himself and his wife lives in [place]. - Former smoker. He smoked for 10 years less than 1 pack a day. He quit 20 years ago  Family hx - Two brothers non Hodgkins lymphoma. At least one required chemotherapy. sister had cervical cancer first diagnosed 2007 and then 3 years later with more cancer discovered - Mother and father passed away from heart disease and diabetes.  ======================================================= Interval Hx update:  He underwent PET-CT in Aug 2022 which showed FDG avid lymph nodes in the left cervical, bilateral supraclavicular regions, and chest, and a few FDG avid abdominal lymph nodes, intramuscular masses in the left posterior chest and left upper thigh, scattered FDG avid subcutaneous soft tissue/nodules with trace right pleural effusion, moderate left pleural effusion, loculated fluid in the right middle lobe. Moderate abdominal and pelvic ascites. Subcutaneous edema in the lower abdominal wall extending into the imaged bilateral thighs. Splenomegaly with mild FDG avidity, suspicious for lymphomatous involvement.  Prior to the PET-CT, he underwent biopsy of both a cervical lymph node and a left axilla lymph node. 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 There were areas of > 20 SUV on the PET-CT, repeat whole lymph node biopsy was requested to confirm suspected diagnosis of Grade 3B FL or transformed large cell lymphoma. S/p excisional biopsy of back lesion on 9/7/22 which showed recurrent DLBCL.  LP 9/26/22: protein elevated at 61, LDH 27, neutrophils 0, lymphocytes 33, monocytes 67, RBC 5. Oligoclonal bands negative. The flow cytometry failed due to insufficient cells. Recommended continued IT MTX therapy x 4 total cycles. He had an interim PET-CT performed on 2/1/23 (3 months after discontinuation of O-miniCHOP in the middle of his therapy - received 3/6 cycles) which showed possible persistent disease including a small, residual focus of increased FDG activity in the left level II region, likely corresponding to a difficult to delineate lymph node, is decreased in size and metabolism (SUV 3.5; image 33; previous SUV 16.1 and skin thickening and subcutaneous nodules, right lower anterior and lateral abdominal wall, slightly more extensive and similar in metabolism (SUV 7.1; image 170; previous SUV 5.5). Discontinued chemoimmunotherapy with Obi-miniCHOP in Nov 2022 due to treatment complications, hospitalizations, poor PS. He was subsequently started on Tafasitamab (anti-CD19) + lenalidomide since 3/30/23. Lenalidomide dose reduced from 20 mg to 15 mg due to episodes of severe neutropenia.  ======================================================= Care Providers:  PMD/ Geriatrician: Dr Allison; Tel: [phone] Endo: Dr Carter Vigil Cardiologist: Dr. Don [phone] fax [phone]  ======================================================= Contacts:  Vonnie (daughter): [phone] - takes all healthcare related calls  ======================================================= [de-identified] : Fine Needle Aspiration Report - Auth (Verified)\par  Specimen(s) Submitted\par  1. AXILLA, LEFT, US GUIDED CORE BIOPSY AND FNA\par  2. LYMPH NODE, CERVICAL, LEFT POSTERIOR, US GUIDED CORE BIOPSY AND FNA\par  \par  \par  Final Diagnosis\par  1. AXILLA, LEFT, US GUIDED CORE BIOPSY AND FNA\par  POSITIVE FOR MALIGNANT CELLS.\par  B-cell lymphoma of follicular center origin, most consistent with\par  follicular lymphoma, grade 3A.  See note.\par  \par  Fine Needle Aspiration Addendum Report - Auth (Verified)\par  \par  Addendum\par  2. LYMPH NODE, CERVICAL, LEFT POSTERIOR, US GUIDED CORE BIOPSY AND FNA\par  POSITIVE FOR MALIGNANT CELLS.\par  B-cell lymphoma of follicular center origin with high proliferation index.\par  See note.\par  \par  Note:\par  The neoplastic B cells demonstrate a follicular center B-cell phenotype with MUM-1 co-expression and a high proliferation index.  Follicular dendritic meshwork is present in most areas of the biopsy, consistent\par  with follicular lymphoma.  However, there is one focal area with increased larger cells and lack of follicular dendritic meshwork. Therefore, a high grade component can not be excluded.  If clinically indicated, suggest excisional biopsy for definitive classification.\par  \par  Immunohistochemical stains   (CD3, CD5, CD10, CD20, CD21, CD23, CD30, BCL-6, BCL-2, cyclinD1, ki-67, c-MYC, PAX-5, MUM-1, p53, ISAURO) were performed on block 2C.  The neoplastic cells are positive for CD20, PAX-5, BCL-6, BCL-2, MUM-1 (partial), dim p53; negative CD5, CD10, CD30, cyclinD1, ISAURO.  CD21 and CD23 highlight follicular dendritic meshwork in most areas with focal absence of follicular dendritic meshwork. \par  Ki-67 proliferation index is approximately 60 to 70%.  C-MYC stains approximately 20 to 30% of cells.  CD3 and CD5 highlight some T-cells in the background. [de-identified] : 6/14/22 FNA of Left axilla LN: FLUORESCENCE IN-SITU HYBRIDIZATION (FISH)\par  1. No evidence of MYC Rearrangement\par  2. No evidence of BCL2-IGH [translocation t(14;18)] gene rearrangement\par  3. 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 [de-identified] : 5/18/22: Initial Visit.  6/20/22: Follow-up. Patient feels well overall. Lymphoma labs and left axilla LN biopsy revealed grade 3A follicular lymphoma, IgG Lambda and Kappa MGUS. He reports lymph nodes have been stable. Heart function is stable. He denies having any recent fevers, chills, nausea. No weight changes.  8/22/22: Follow-up. Patient feels well overall. Awaiting for biopsy results of lymph nodes in his back. He does not have pain in the left back. The left side of his neck gives him discomfort. He has never received chemotherapy nor antibody treatment in the past. No fevers, chills, night sweats. No new noticeable masses  Good appetite, lost 7 lbs (lost a lot of fluid weight due to diuretics).   10/17/22: Follow up. In the interim, he was hospitalized at [facility] twice (8/27-9/12 & 9/16-10/3). 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 Patient was treated with rasburicase, but developed rasburicase-triggered G6PD hemolysis. Also found to be in acute exacerbation of HFrEF and have a prolonged QTc (likely medication-induced). During hospital stay was found to have altered mental status. 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 In mid-September patient was hospitalized for Encephalopathy (+Rhinovirus/enterovirus) unrelated to the Lymphoma. Today, he feels at baseline. Notes resolution of cervical LNs, and back lesions since starting treatment. Patient denies fever, chills, night sweats, back pain, abdominal pain, chest pain, or shortness of breath. Fair appetite, weight loss due to prolonged hospitalizations.  11/17/22: Follow-up. On 10/28/22 at home was found down by his daughter found to have fever, STEPHEN and AMS and was admitted for acute metabolic encephalopathy with sepsis 2/2 pneumonia s/p 7 days zosyn with improvement. Today he presents from rehab with his daughter. He is not feeling well. He feels that he is weak and fatigued. He has been doing PT / walking around the facility. He reads when awake. He has a poor appetite, but his family is bringing in food that he likes. No fevers, chills, night sweats. No new lumps or masses.  2/6/23: Follow-up. He was readmitted [facility] 1/9/23 to 1/20/23 for malaise and cytopenias. In December 2022 he had pneumonia and COVID19 for which there has been a long recovery. Due to these complications, his treatment with chemoimmunotherapy (O-miniCHOP) was discontinued. He is currently staying in a rehab to improve his performance status. He overall feels well today and reports feeling much improved relative to Dec 2022. He eats 3 meals a day, but prefers to eat late at night. His appetite is good and his daughter brings a lot of food supplementation for him. He continues to lose weight however. No other recent illnesses. He had a PET-CT last week.  3/13/23: Followup. He has not yet rec'd revlimid but is now approved for free drug. Continues to have issues gaining weight, and reports a very good appetite. He has not lost weight at least. He also has been having right foot pain between the ankle and toes since the night of 3/6/23. He also has a rash on toes 1-3. He continues to have right lateral abdominal itchiness around a site of swelling / lump. This has not changed in the past month. He is now back home. He is able to ambulate with a walker. He is not limited by dyspnea. He had edema in the rehab which has resolved. Per family he has also been having intermittent periods where he is not himself and he becomes aggressive toward family members. This has been an ongoing issue for sometime   3/30/23: Follow-up. Today is Cycle 1 Day 1 of Tafa. He has not yet rec'd revlimid (expected delivery today); is approved for free drug via [employer]. Reports intentional weight gain over the past couple of weeks and ambulating with a walker. Continues to have right foot pain between the ankle and toes since the night of 3/6/23, and have right lateral abdominal itchiness around a site of swelling / lump. This has not changed in the past month. He is now back home. Per family he has intermittent periods where he is not himself and he becomes aggressive toward family members.   4/6/23: Follow-up. Today is Cycle 1 Day 8 of Tafasitamab. Did receive revlimid on day 1 and has been tolerating Revlimid well. Denies any abdominal issues, continues to have a good appetite. Ambulating with a walker but continues to have right foot pain, and have right lateral abdominal itchiness around a site of swelling / lump. Per family he has intermittent periods where he is not himself and he becomes aggressive toward family members.   4/13/23: Follow-up. Today is Cycle 1 Day 15 of Tafasitamab. He is tolerating the regimen well. Denies any abdominal issues, continues to have a good appetite. Ambulating with a walker but continues to have right foot pain, and have right lateral abdominal itchiness around a site of swelling / lump. Per family he has intermittent periods where he is not himself and he becomes aggressive toward family members.   4/20/23: Follow-up. Today is Cycle 1 Day 22. Today he reports feeling well, but has noticed his right leg / foot is swollen and associated with shoe tightness. He reports that he has been taking his apixaban which he takes for cardiac reasons. His ANC is 0, but denies any mucositis, or other infectious issues. No new lumps or masses. His right abd mass is decreasing in size.  4/27/23: Follow-up. Today is Cycle 1 Day 29. After receiving zarxio he developed a facial rash which is now self resolved. He remains off of Revlimid due to neutropenia. Has the mediport insertion scheduled for May 9th. Today he reports feeling well, his right leg / foot remains stable and swollen; associated with shoe tightness. US Duplex (4/20/23) negative for DVT. He reports that he has been taking his apixaban which he takes for cardiac reasons. Denies any mucositis, or other infectious issues. No new lumps or masses. His right abd mass is decreasing in size.  5/4/23: Follow-up. Today is Cycle 1 Day 36; has been unable to receive Tafa due to peripheral access limitation therefore cycle 2 has not been counted. He has restarted Revlimid at a lowered dose of 15mg due to neutropenia, tolerating well without neutropenia thus far. He has the mediport insertion scheduled for May 9th. Today he reports feeling well, his right leg / foot remains stable and swollen;US Duplex (4/20/23) negative for DVT. Remains on apixaban which he takes for cardiac reasons. Denies any mucositis, or other infectious issues. No new lumps or masses. His right abd mass is no longer palpable.  5/25/23: Follow-up. Today is Cycle 2 Day 15. He has a mediport in place now and is able to receive the Tafasitamab without issue, tolerating Revlimid well at a lowered dose of 15mg due to neutropenia. Today he reports feeling well, his intermittent right leg swelling is much improved. Has been hydrating well recently, given elevated BUN. Remains on apixaban which he takes for cardiac reasons. Denies any bleeding, mucositis, masses/lumps, fever, chills, or night sweats. Good appetite.  6/19/23: Follow-up. Today is Cycle 3 Day 12 of Tafa + Revlimid. Due to neutropenia about 2 weeks ago he was again advised to hold the Revlimid until further notice. He has been having swelling in his hands, left > right with significant pain (gout-like) in the left thumb. He developed a sore throat yesterday at home and was using honey to help soothe. Today he only has a sore throat when swallowing liquids. He was aslo having chills, confused and having visual hallucinations.  He denies chest pain or shortness of breath. He ambulated into the center today, per daughter he has been walking much slower. He denies any issues with bowel or urinary function. He reports he is hydrating a couple times a day per daughter up to 500 mL a day. He is still off revlimid. Blood pressure rechecked 86/58.  7/26/23: Follow-up. He feels well today. He has noticed all of his prior lymphadenopathy disappear. He is not noticing any new lumps or masses. He denies constitutional issues. His neutrophils remain in the severely low range, however he has not developed any infections. He has been taking abx ppx. We will hold tafa as it can also negatively affect neutrophils. He has a left epicondylar mass that he states he has had for a very long time (years), which we will watch.  8/16/23: Follow-up. Mr Skaggs presents today with his daughter. He just arrived from seeing a neurologist, Dr Childers regarding his memory issues and occasional personality changes. Vonnie his daughter states that the past month has been better. He also has been evaluated at the [facility] clinic in [place] for his past exposures and is awaiting word on the status of that. His left elbow area swelling has not changed. He has not noticed any new lumps or masses. No constitutional issues. His neutrophils have since recovered and were likely low primarily due to Monjuvi (tafa). ROS was negative for other issues.  9/6/23: Follow up. Patient reports feeling well today. He gained 8lbs since last visit. His WBC 5.29, Hb 10.4, Plt 98 today. No concerning signs of symptoms today.  10/18/23: Follow-up. Today is Cycle 6 Day 26. He feels well today. He has not noticed any new lumps or masses. All prior masses have resolved. He has not had any new infections or constitutional issues. He remains active at home without health related limitations.  12/6/23: Follow-up. The prior 2 treatments (2 weeks) have been held due to asymptomatic neutropenia. His ANC is starting to recover today. He has been off Revlimid for over 2 weeks. He has not received Tafa since 11/4/23. Will aim for next dose 12/30/23 with a CBC check 12/27/23. No active issues today. No lumps or masses per hx or exam. No recent illnesses, fevers, constitutional issues.  2/5/24: Follow-up. He saw Dr Munguia (Geriatrics) earlier today to discuss general medical issues. His BP was elevated and was repeated today here in the oncology office at 158/71, noted Hx of white coat syndrome. He feels overall well. No new issues. He denies any lumps and masses. All former masses have resolved. He has not had any recent infections. His ANC has remained >1000 since Dec 28, 2023. No constitutional issues. He is tolerating therapy without any side effects currently.  7/3/24: Follow-up. Saw [facility] clinic, thought to have unspecified dementia, no treatments initiated. Patient feels well and reports good performance status. Tolerating tafasitamab infusions (occ headaches), reports good adherence to Revlimid plan. Currently on Cycle 14. Plan for monthly Tafa from cycle 15 onward.  9/4/24: Follow-up. Today is cycle 16 day 26. Pt states that he has some oral discomfort likely from dentures shifting. Of note, patient has been sleeping in dentures regularly. Denies. Denies fevers, chills, and LAD.  A comprehensive review of systems was performed including constitutional, eyes, ENT, cardiovascular, respiratory, gastrointestinal, genitourinary, musculoskeletal, integumentary, neurological, psychiatric and hematologic / lymphatic. All pertinent positives are included in the H&P under interval history above and the remaining review of systems listed are negative.   ==================================================== Treatment Hx:  Obinutuzumab-mini-COEP q21 days x 3 cycles (incomplete due to toxicities and patient preference to switch to more tolerable regimen) (Obinutuzumab modified mini-COEP: 400 mg/m cyclophosphamide, Etoposide (40% dose reduced to 30 mg/m2 IV on day 1 and 60 mg/m2 PO on days 2 and 3 of each cycle), and 2 mg vincristine (flat dose) on day 1 of each cycle, and 100 mg prednisone on days 1-5. Modified from Man et al 2009 Blood "R-CHOP with Etoposide Substituted for Doxorubicin (R-CEOP): Excellent Outcome in Diffuse Large B Cell Lymphoma for Patients with a Contraindication to Anthracyclines." with mini- dosing for elderly patients) - Cycle 1 Day 1 given 9/2/22 - third dose of Obinutuzumab held due to AMS, requiring admission to [facility] found to have enterovirus infection - Cycle 2 Day 1 given 9/30/22 - Given as an inpatient at [facility] - Cycle 3 Day 1 given 10/21/22 - Complicated by pneumonia, requiring admission and rehab placement  Tafasitamab plus Revlimid (changed therapy due to patient and family's wishes due to intolerable toxicities), On 28 day cycles. - Cycle 1 Day 1 on 3/30/23 (HELD revlimid briefly starting 4/20/23 due to neutropenia) - Cycle 2 Day 1 on 5/11/23 (HELD Revlimid since end of May, not yet restarted due to prolonged neutropenia) - Cycle 3 Day 1 on 6/8/23 (On 6/29/23 during cycle 3 he had experienced hypotension down to 86/58 in setting of sore throat and chills, confusion with visual hallucinations in the setting of neutropenia and was sent to [facility] ED, delayed tx 1 week) - Cycle 4 day 1 on 7/15/23 - Cycle 5 Day 1 was HELD on 8/12/23 for persistent severe neutropenia without infection / complications, delayed until 8/26/23 - Cycle 6 Day 1 on 9/23/23 - Cycle 7 Day 1 on 10/21/23 (HELD treatment related to severe neutropenia) - Cycle 8 Day 1 now planned for 12/30/23 (was initially on 11/18/23, however the entire cycle has been held due to severe neutropenia) - Cycle 9 Day 1 on 1/27/24 - Cycle 10 Day 1 on 2/24/24 - Cycle 11 Day 1 on 3/23/24 - Cycle 12 Day 1 on 4/20/24 - Cycle 13 Day 1 on 5/18/24 - Cycle 14 Day 1 on 6/15/24 - Cycle 15 Day 1 on 7/13/24 (changed to every 4 weeks Tafa) - Cycle 16 Day 1 on 8/10/24 - Cycle 17 Day 1 on 9/7/24  ==================================================== [FreeTextEntry1] : Tafasitamab (monthly) plus Revlimid 20 mg daily 21/28 days.

## 2024-09-21 ENCOUNTER — APPOINTMENT (OUTPATIENT)
Dept: INFUSION THERAPY | Facility: HOSPITAL | Age: 74
End: 2024-09-21

## 2024-09-21 ENCOUNTER — APPOINTMENT (OUTPATIENT)
Dept: HEMATOLOGY ONCOLOGY | Facility: CLINIC | Age: 74
End: 2024-09-21

## 2024-10-01 NOTE — ED ADULT NURSE NOTE - NS ED NURSE LEVEL OF CONSCIOUSNESS MENTAL STATUS
[FreeTextEntry1] : The patient is a 61-year-old G2, P2 postmenopausal white female of [place] and [place] descent.  She underwent menarche at age 13 and had her first child at age 24.  She underwent menopause at age 48 and never took any hormone replacement therapy.  She has no family history of breast or ovarian cancer.  Her sister had renal cell carcinoma and underwent a nephrectomy.  She quit smoking at age 58 and smoked about a half a pack a day starting in her 20s.  Her  is also a smoker.  The patient underwent a screening mammography at [facility] in [place] back on January 2, 2024 showing some increasing calcifications in the left breast 6:00 region.  Diagnostic mammography confirmed these increasing calcifications on January 11, 2024.  She then underwent a stereotactic core biopsy on February 22, 2024 showing high-grade DCIS in his left breast 6:00 region 4 cm from the nipple.  She underwent a bilateral breast MRI on March 27, 2024 which showed an area of non-mass like enhancement measuring 3.2 x 2.1 x 2.5 cm corresponding to the area of calcifications on mammography.  She was then seen by Dr. Demetrice White at [facility] and underwent a left breast partial mastectomy and sentinel lymph node biopsy through a 6:00 periareolar incision on April 9, 2024.  The final pathology showed 1 negative sentinel lymph node and high-grade DCIS in 10 of 20 slides measuring 1.2 cm in greatest dimension.  She had a positive superior and medial margin and closest inferior and anterior margins less than a millimeter.  The DCIS was ER positive at 90% and NV moderately positive at 60%.  She was seen as a second opinion on April 30, 2024 and it was felt that a left mastectomy was indicated.  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  Her images were reviewed by my radiologist and it was felt that the cancer was far in enough away from the nipple to offer her nipple sparing technique.  The patient chose to have a contralateral prophylactic mastectomy.  She underwent the bilateral nipple sparing mastectomies through an inframammary technique with prepectoral direct to implant reconstructions by Dr. Rodgers on June 12, 2024.  The final pathology showed some residual high-grade DCIS measuring up to 1.5 cm but margins were all clear as well as the left breast retroareolar biopsy.  The prophylactic right breast mastectomy just showed benign fibrocystic change in the right retroareolar biopsy was benign.  This was a left breast stage 0 breast cancer.  She was seen by Dr. Snow and no endocrine therapy was recommended given the fact that she had bilateral mastectomies for DCIS.  The patient comes in now for routine follow-up. Awake

## 2024-10-05 ENCOUNTER — RESULT REVIEW (OUTPATIENT)
Age: 74
End: 2024-10-05

## 2024-10-05 ENCOUNTER — APPOINTMENT (OUTPATIENT)
Dept: INFUSION THERAPY | Facility: HOSPITAL | Age: 74
End: 2024-10-05

## 2024-10-05 ENCOUNTER — APPOINTMENT (OUTPATIENT)
Dept: HEMATOLOGY ONCOLOGY | Facility: CLINIC | Age: 74
End: 2024-10-05

## 2024-10-05 LAB
ALBUMIN SERPL ELPH-MCNC: 3.8 G/DL — SIGNIFICANT CHANGE UP (ref 3.3–5)
ALP SERPL-CCNC: 84 U/L — SIGNIFICANT CHANGE UP (ref 40–120)
ALT FLD-CCNC: 10 U/L — SIGNIFICANT CHANGE UP (ref 10–45)
ANION GAP SERPL CALC-SCNC: 17 MMOL/L — SIGNIFICANT CHANGE UP (ref 5–17)
AST SERPL-CCNC: 11 U/L — SIGNIFICANT CHANGE UP (ref 10–40)
BASOPHILS # BLD AUTO: 0.07 K/UL — SIGNIFICANT CHANGE UP (ref 0–0.2)
BASOPHILS NFR BLD AUTO: 1.4 % — SIGNIFICANT CHANGE UP (ref 0–2)
BILIRUB SERPL-MCNC: 1.2 MG/DL — SIGNIFICANT CHANGE UP (ref 0.2–1.2)
BUN SERPL-MCNC: 26 MG/DL — HIGH (ref 7–23)
CALCIUM SERPL-MCNC: 8.3 MG/DL — LOW (ref 8.4–10.5)
CHLORIDE SERPL-SCNC: 101 MMOL/L — SIGNIFICANT CHANGE UP (ref 96–108)
CO2 SERPL-SCNC: 23 MMOL/L — SIGNIFICANT CHANGE UP (ref 22–31)
CREAT SERPL-MCNC: 1.52 MG/DL — HIGH (ref 0.5–1.3)
EGFR: 48 ML/MIN/1.73M2 — LOW
EOSINOPHIL # BLD AUTO: 0.17 K/UL — SIGNIFICANT CHANGE UP (ref 0–0.5)
EOSINOPHIL NFR BLD AUTO: 3.3 % — SIGNIFICANT CHANGE UP (ref 0–6)
GLUCOSE SERPL-MCNC: 87 MG/DL — SIGNIFICANT CHANGE UP (ref 70–99)
HCT VFR BLD CALC: 32 % — LOW (ref 39–50)
HGB BLD-MCNC: 10.8 G/DL — LOW (ref 13–17)
IMM GRANULOCYTES NFR BLD AUTO: 0.4 % — SIGNIFICANT CHANGE UP (ref 0–0.9)
LDH SERPL L TO P-CCNC: 176 U/L — SIGNIFICANT CHANGE UP (ref 50–242)
LYMPHOCYTES # BLD AUTO: 0.66 K/UL — LOW (ref 1–3.3)
LYMPHOCYTES # BLD AUTO: 13 % — SIGNIFICANT CHANGE UP (ref 13–44)
MAGNESIUM SERPL-MCNC: 1 MG/DL — CRITICAL LOW (ref 1.6–2.6)
MCHC RBC-ENTMCNC: 31 PG — SIGNIFICANT CHANGE UP (ref 27–34)
MCHC RBC-ENTMCNC: 33.8 G/DL — SIGNIFICANT CHANGE UP (ref 32–36)
MCV RBC AUTO: 92 FL — SIGNIFICANT CHANGE UP (ref 80–100)
MONOCYTES # BLD AUTO: 0.53 K/UL — SIGNIFICANT CHANGE UP (ref 0–0.9)
MONOCYTES NFR BLD AUTO: 10.4 % — SIGNIFICANT CHANGE UP (ref 2–14)
NEUTROPHILS # BLD AUTO: 3.64 K/UL — SIGNIFICANT CHANGE UP (ref 1.8–7.4)
NEUTROPHILS NFR BLD AUTO: 71.5 % — SIGNIFICANT CHANGE UP (ref 43–77)
NRBC # BLD: 0 /100 WBCS — SIGNIFICANT CHANGE UP (ref 0–0)
PHOSPHATE SERPL-MCNC: 1 MG/DL — CRITICAL LOW (ref 2.5–4.5)
PLATELET # BLD AUTO: 125 K/UL — LOW (ref 150–400)
POTASSIUM SERPL-MCNC: 3.3 MMOL/L — LOW (ref 3.5–5.3)
POTASSIUM SERPL-SCNC: 3.3 MMOL/L — LOW (ref 3.5–5.3)
PROT SERPL-MCNC: 6.8 G/DL — SIGNIFICANT CHANGE UP (ref 6–8.3)
RBC # BLD: 3.48 M/UL — LOW (ref 4.2–5.8)
RBC # FLD: 15.5 % — HIGH (ref 10.3–14.5)
SODIUM SERPL-SCNC: 140 MMOL/L — SIGNIFICANT CHANGE UP (ref 135–145)
URATE SERPL-MCNC: 6.9 MG/DL — SIGNIFICANT CHANGE UP (ref 3.4–8.8)
WBC # BLD: 5.09 K/UL — SIGNIFICANT CHANGE UP (ref 3.8–10.5)
WBC # FLD AUTO: 5.09 K/UL — SIGNIFICANT CHANGE UP (ref 3.8–10.5)

## 2024-10-06 NOTE — BEHAVIORAL HEALTH ASSESSMENT NOTE - NSBHCHARTREVIEWIMAGING_PSY_A_CORE FT
06-Oct-2024 18:23
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 No evidence of acute pathology.   Age-appropriate involutional changes.      < end of copied text >

## 2024-10-10 ENCOUNTER — APPOINTMENT (OUTPATIENT)
Dept: CARDIOLOGY | Facility: CLINIC | Age: 74
End: 2024-10-10
Payer: MEDICARE

## 2024-10-10 ENCOUNTER — NON-APPOINTMENT (OUTPATIENT)
Age: 74
End: 2024-10-10

## 2024-10-10 VITALS
OXYGEN SATURATION: 99 % | HEIGHT: 71 IN | DIASTOLIC BLOOD PRESSURE: 60 MMHG | HEART RATE: 66 BPM | BODY MASS INDEX: 21 KG/M2 | WEIGHT: 150 LBS | SYSTOLIC BLOOD PRESSURE: 110 MMHG

## 2024-10-10 DIAGNOSIS — I43 HYPERTENSIVE HEART DISEASE WITH HEART FAILURE: ICD-10-CM

## 2024-10-10 DIAGNOSIS — I45.9 CONDUCTION DISORDER, UNSPECIFIED: ICD-10-CM

## 2024-10-10 DIAGNOSIS — I25.10 ATHEROSCLEROTIC HEART DISEASE OF NATIVE CORONARY ARTERY W/OUT ANGINA PECTORIS: ICD-10-CM

## 2024-10-10 DIAGNOSIS — I48.92 UNSPECIFIED ATRIAL FLUTTER: ICD-10-CM

## 2024-10-10 DIAGNOSIS — I50.22 CHRONIC SYSTOLIC (CONGESTIVE) HEART FAILURE: ICD-10-CM

## 2024-10-10 DIAGNOSIS — I11.0 HYPERTENSIVE HEART DISEASE WITH HEART FAILURE: ICD-10-CM

## 2024-10-10 PROCEDURE — G2211 COMPLEX E/M VISIT ADD ON: CPT

## 2024-10-10 PROCEDURE — 99214 OFFICE O/P EST MOD 30 MIN: CPT

## 2024-10-10 PROCEDURE — 93000 ELECTROCARDIOGRAM COMPLETE: CPT

## 2024-10-10 PROCEDURE — 93306 TTE W/DOPPLER COMPLETE: CPT

## 2024-10-25 ENCOUNTER — RX RENEWAL (OUTPATIENT)
Age: 74
End: 2024-10-25

## 2024-10-29 ENCOUNTER — OUTPATIENT (OUTPATIENT)
Dept: OUTPATIENT SERVICES | Facility: HOSPITAL | Age: 74
LOS: 1 days | Discharge: HOME | End: 2024-10-29

## 2024-10-29 DIAGNOSIS — C85.90 NON-HODGKIN LYMPHOMA, UNSPECIFIED, UNSPECIFIED SITE: Chronic | ICD-10-CM

## 2024-10-29 DIAGNOSIS — Z95.0 PRESENCE OF CARDIAC PACEMAKER: Chronic | ICD-10-CM

## 2024-10-29 DIAGNOSIS — C82.20 FOLLICULAR LYMPHOMA GRADE III, UNSPECIFIED, UNSPECIFIED SITE: ICD-10-CM

## 2024-11-04 ENCOUNTER — RESULT REVIEW (OUTPATIENT)
Age: 74
End: 2024-11-04

## 2024-11-04 ENCOUNTER — APPOINTMENT (OUTPATIENT)
Dept: HEMATOLOGY ONCOLOGY | Facility: CLINIC | Age: 74
End: 2024-11-04

## 2024-11-04 ENCOUNTER — APPOINTMENT (OUTPATIENT)
Dept: HEMATOLOGY ONCOLOGY | Facility: CLINIC | Age: 74
End: 2024-11-04
Payer: MEDICARE

## 2024-11-04 ENCOUNTER — APPOINTMENT (OUTPATIENT)
Dept: INFUSION THERAPY | Facility: HOSPITAL | Age: 74
End: 2024-11-04

## 2024-11-04 DIAGNOSIS — C82.20 FOLLICULAR LYMPHOMA GRADE III, UNSPECIFIED, UNSPECIFIED SITE: ICD-10-CM

## 2024-11-04 LAB
BASOPHILS # BLD AUTO: 0.04 K/UL — SIGNIFICANT CHANGE UP (ref 0–0.2)
BASOPHILS NFR BLD AUTO: 1 % — SIGNIFICANT CHANGE UP (ref 0–2)
EOSINOPHIL # BLD AUTO: 0.26 K/UL — SIGNIFICANT CHANGE UP (ref 0–0.5)
EOSINOPHIL NFR BLD AUTO: 6.5 % — HIGH (ref 0–6)
HCT VFR BLD CALC: 30.8 % — LOW (ref 39–50)
HGB BLD-MCNC: 10.3 G/DL — LOW (ref 13–17)
IMM GRANULOCYTES NFR BLD AUTO: 1.2 % — HIGH (ref 0–0.9)
LYMPHOCYTES # BLD AUTO: 0.6 K/UL — LOW (ref 1–3.3)
LYMPHOCYTES # BLD AUTO: 14.9 % — SIGNIFICANT CHANGE UP (ref 13–44)
MCHC RBC-ENTMCNC: 32.1 PG — SIGNIFICANT CHANGE UP (ref 27–34)
MCHC RBC-ENTMCNC: 33.4 G/DL — SIGNIFICANT CHANGE UP (ref 32–36)
MCV RBC AUTO: 96 FL — SIGNIFICANT CHANGE UP (ref 80–100)
MONOCYTES # BLD AUTO: 0.42 K/UL — SIGNIFICANT CHANGE UP (ref 0–0.9)
MONOCYTES NFR BLD AUTO: 10.4 % — SIGNIFICANT CHANGE UP (ref 2–14)
NEUTROPHILS # BLD AUTO: 2.65 K/UL — SIGNIFICANT CHANGE UP (ref 1.8–7.4)
NEUTROPHILS NFR BLD AUTO: 66 % — SIGNIFICANT CHANGE UP (ref 43–77)
NRBC # BLD: 0 /100 WBCS — SIGNIFICANT CHANGE UP (ref 0–0)
NRBC BLD-RTO: 0 /100 WBCS — SIGNIFICANT CHANGE UP (ref 0–0)
PLATELET # BLD AUTO: 89 K/UL — LOW (ref 150–400)
RBC # BLD: 3.21 M/UL — LOW (ref 4.2–5.8)
RBC # FLD: 15.9 % — HIGH (ref 10.3–14.5)
WBC # BLD: 4.02 K/UL — SIGNIFICANT CHANGE UP (ref 3.8–10.5)
WBC # FLD AUTO: 4.02 K/UL — SIGNIFICANT CHANGE UP (ref 3.8–10.5)

## 2024-11-04 PROCEDURE — 99215 OFFICE O/P EST HI 40 MIN: CPT

## 2024-11-04 PROCEDURE — G2211 COMPLEX E/M VISIT ADD ON: CPT

## 2024-11-05 LAB
ALBUMIN SERPL ELPH-MCNC: 3.9 G/DL — SIGNIFICANT CHANGE UP (ref 3.3–5)
ALP SERPL-CCNC: 100 U/L — SIGNIFICANT CHANGE UP (ref 40–120)
ALT FLD-CCNC: 9 U/L — LOW (ref 10–45)
ANION GAP SERPL CALC-SCNC: 14 MMOL/L — SIGNIFICANT CHANGE UP (ref 5–17)
AST SERPL-CCNC: 11 U/L — SIGNIFICANT CHANGE UP (ref 10–40)
BILIRUB SERPL-MCNC: 0.4 MG/DL — SIGNIFICANT CHANGE UP (ref 0.2–1.2)
BUN SERPL-MCNC: 22 MG/DL — SIGNIFICANT CHANGE UP (ref 7–23)
CALCIUM SERPL-MCNC: 8.6 MG/DL — SIGNIFICANT CHANGE UP (ref 8.4–10.5)
CHLORIDE SERPL-SCNC: 102 MMOL/L — SIGNIFICANT CHANGE UP (ref 96–108)
CO2 SERPL-SCNC: 26 MMOL/L — SIGNIFICANT CHANGE UP (ref 22–31)
CREAT SERPL-MCNC: 1.32 MG/DL — HIGH (ref 0.5–1.3)
EGFR: 57 ML/MIN/1.73M2 — LOW
GLUCOSE SERPL-MCNC: 112 MG/DL — HIGH (ref 70–99)
LDH SERPL L TO P-CCNC: 217 U/L — SIGNIFICANT CHANGE UP (ref 50–242)
MAGNESIUM SERPL-MCNC: 1.7 MG/DL — SIGNIFICANT CHANGE UP (ref 1.6–2.6)
PHOSPHATE SERPL-MCNC: 2.8 MG/DL — SIGNIFICANT CHANGE UP (ref 2.5–4.5)
POTASSIUM SERPL-MCNC: 4 MMOL/L — SIGNIFICANT CHANGE UP (ref 3.5–5.3)
POTASSIUM SERPL-SCNC: 4 MMOL/L — SIGNIFICANT CHANGE UP (ref 3.5–5.3)
PROT SERPL-MCNC: 6.6 G/DL — SIGNIFICANT CHANGE UP (ref 6–8.3)
SODIUM SERPL-SCNC: 142 MMOL/L — SIGNIFICANT CHANGE UP (ref 135–145)
T4 FREE+ TSH PNL SERPL: 1.11 UIU/ML — SIGNIFICANT CHANGE UP (ref 0.27–4.2)
URATE SERPL-MCNC: 5.9 MG/DL — SIGNIFICANT CHANGE UP (ref 3.4–8.8)

## 2024-11-06 NOTE — PATIENT PROFILE ADULT - NSPROPTRIGHTREPNAME_GEN_A__NUR
Discharge instructions provided to patient.     Prescriptions for robaxin, norco and toradol called in to pharmacy.     Regular island dressings given to patient for at home changes.     Patient verbalized understanding and had no follow up questions.     Nurse Note:       11/6/2024   1:12 PM      Perception of Care - Joint Replacement Population Total Joint Surgery List: KNEE    Patient able to verbalize one way to treat/prevent SWELLING at home   [x] Yes   [] No   [] Further Education Provided        Attending Nurse:  Germaine            Vonnie Skaggs

## 2024-11-30 ENCOUNTER — APPOINTMENT (OUTPATIENT)
Dept: INFUSION THERAPY | Facility: HOSPITAL | Age: 74
End: 2024-11-30

## 2024-11-30 ENCOUNTER — APPOINTMENT (OUTPATIENT)
Dept: HEMATOLOGY ONCOLOGY | Facility: CLINIC | Age: 74
End: 2024-11-30

## 2024-12-02 ENCOUNTER — NON-APPOINTMENT (OUTPATIENT)
Age: 74
End: 2024-12-02

## 2024-12-05 ENCOUNTER — NON-APPOINTMENT (OUTPATIENT)
Age: 74
End: 2024-12-05

## 2024-12-05 ENCOUNTER — APPOINTMENT (OUTPATIENT)
Dept: ELECTROPHYSIOLOGY | Facility: CLINIC | Age: 74
End: 2024-12-05
Payer: MEDICARE

## 2024-12-05 PROCEDURE — 93294 REM INTERROG EVL PM/LDLS PM: CPT

## 2024-12-05 PROCEDURE — 93296 REM INTERROG EVL PM/IDS: CPT

## 2024-12-16 ENCOUNTER — APPOINTMENT (OUTPATIENT)
Dept: HEMATOLOGY ONCOLOGY | Facility: CLINIC | Age: 74
End: 2024-12-16

## 2024-12-18 ENCOUNTER — APPOINTMENT (OUTPATIENT)
Dept: HEMATOLOGY ONCOLOGY | Facility: CLINIC | Age: 74
End: 2024-12-18
Payer: MEDICARE

## 2024-12-18 ENCOUNTER — APPOINTMENT (OUTPATIENT)
Dept: GERIATRICS | Facility: CLINIC | Age: 74
End: 2024-12-18
Payer: MEDICARE

## 2024-12-18 ENCOUNTER — RESULT REVIEW (OUTPATIENT)
Age: 74
End: 2024-12-18

## 2024-12-18 VITALS
TEMPERATURE: 97.1 F | SYSTOLIC BLOOD PRESSURE: 196 MMHG | HEART RATE: 63 BPM | OXYGEN SATURATION: 99 % | DIASTOLIC BLOOD PRESSURE: 82 MMHG | WEIGHT: 160 LBS | RESPIRATION RATE: 14 BRPM | BODY MASS INDEX: 22.32 KG/M2

## 2024-12-18 VITALS
SYSTOLIC BLOOD PRESSURE: 182 MMHG | RESPIRATION RATE: 16 BRPM | OXYGEN SATURATION: 97 % | TEMPERATURE: 97.9 F | WEIGHT: 160.06 LBS | BODY MASS INDEX: 22.32 KG/M2 | DIASTOLIC BLOOD PRESSURE: 74 MMHG | HEART RATE: 67 BPM

## 2024-12-18 VITALS — DIASTOLIC BLOOD PRESSURE: 70 MMHG | SYSTOLIC BLOOD PRESSURE: 169 MMHG

## 2024-12-18 DIAGNOSIS — I10 ESSENTIAL (PRIMARY) HYPERTENSION: ICD-10-CM

## 2024-12-18 DIAGNOSIS — I48.92 UNSPECIFIED ATRIAL FLUTTER: ICD-10-CM

## 2024-12-18 DIAGNOSIS — C83.30 DIFFUSE LARGE B-CELL LYMPHOMA, UNSPECIFIED SITE: ICD-10-CM

## 2024-12-18 DIAGNOSIS — F03.90 UNSPECIFIED DEMENTIA W/OUT BEHAVIORAL DISTURBANCE: ICD-10-CM

## 2024-12-18 DIAGNOSIS — C82.20 FOLLICULAR LYMPHOMA GRADE III, UNSPECIFIED, UNSPECIFIED SITE: ICD-10-CM

## 2024-12-18 DIAGNOSIS — M10.9 GOUT, UNSPECIFIED: ICD-10-CM

## 2024-12-18 LAB
BASOPHILS # BLD AUTO: 0.04 K/UL — SIGNIFICANT CHANGE UP (ref 0–0.2)
BASOPHILS NFR BLD AUTO: 0.6 % — SIGNIFICANT CHANGE UP (ref 0–2)
EOSINOPHIL # BLD AUTO: 0.25 K/UL — SIGNIFICANT CHANGE UP (ref 0–0.5)
EOSINOPHIL NFR BLD AUTO: 3.5 % — SIGNIFICANT CHANGE UP (ref 0–6)
HCT VFR BLD CALC: 40.8 % — SIGNIFICANT CHANGE UP (ref 39–50)
HGB BLD-MCNC: 13.3 G/DL — SIGNIFICANT CHANGE UP (ref 13–17)
IMM GRANULOCYTES NFR BLD AUTO: 0.4 % — SIGNIFICANT CHANGE UP (ref 0–0.9)
LYMPHOCYTES # BLD AUTO: 0.7 K/UL — LOW (ref 1–3.3)
LYMPHOCYTES # BLD AUTO: 9.7 % — LOW (ref 13–44)
MCHC RBC-ENTMCNC: 32 PG — SIGNIFICANT CHANGE UP (ref 27–34)
MCHC RBC-ENTMCNC: 32.1 G/DL — SIGNIFICANT CHANGE UP (ref 32–36)
MCV RBC AUTO: 99.5 FL — SIGNIFICANT CHANGE UP (ref 80–100)
MONOCYTES # BLD AUTO: 0.57 K/UL — SIGNIFICANT CHANGE UP (ref 0–0.9)
MONOCYTES NFR BLD AUTO: 7.9 % — SIGNIFICANT CHANGE UP (ref 2–14)
NEUTROPHILS # BLD AUTO: 5.6 K/UL — SIGNIFICANT CHANGE UP (ref 1.8–7.4)
NEUTROPHILS NFR BLD AUTO: 77.9 % — HIGH (ref 43–77)
NRBC # BLD: 0 /100 WBCS — SIGNIFICANT CHANGE UP (ref 0–0)
NRBC BLD-RTO: 0 /100 WBCS — SIGNIFICANT CHANGE UP (ref 0–0)
PLATELET # BLD AUTO: 135 K/UL — LOW (ref 150–400)
RBC # BLD: 4.16 M/UL — LOW (ref 4.2–5.8)
RBC # FLD: 15.4 % — HIGH (ref 10.3–14.5)
WBC # BLD: 7.19 K/UL — SIGNIFICANT CHANGE UP (ref 3.8–10.5)
WBC # FLD AUTO: 7.19 K/UL — SIGNIFICANT CHANGE UP (ref 3.8–10.5)

## 2024-12-18 PROCEDURE — G2211 COMPLEX E/M VISIT ADD ON: CPT

## 2024-12-18 PROCEDURE — 99213 OFFICE O/P EST LOW 20 MIN: CPT

## 2024-12-18 PROCEDURE — 99215 OFFICE O/P EST HI 40 MIN: CPT

## 2024-12-18 RX ORDER — PREDNISONE 20 MG/1
20 TABLET ORAL
Qty: 14 | Refills: 0 | Status: COMPLETED | COMMUNITY
Start: 2024-09-27

## 2024-12-19 LAB
ALBUMIN SERPL ELPH-MCNC: 4.7 G/DL
ALP BLD-CCNC: 139 U/L
ALT SERPL-CCNC: 8 U/L
ANION GAP SERPL CALC-SCNC: 13 MMOL/L
AST SERPL-CCNC: 13 U/L
BILIRUB SERPL-MCNC: 0.3 MG/DL
BUN SERPL-MCNC: 28 MG/DL
CALCIUM SERPL-MCNC: 9.7 MG/DL
CHLORIDE SERPL-SCNC: 106 MMOL/L
CO2 SERPL-SCNC: 28 MMOL/L
CREAT SERPL-MCNC: 1.4 MG/DL
EGFR: 53 ML/MIN/1.73M2
GLUCOSE SERPL-MCNC: 140 MG/DL
LDH SERPL-CCNC: 224 U/L
MAGNESIUM SERPL-MCNC: 2.1 MG/DL
PHOSPHATE SERPL-MCNC: 3.3 MG/DL
POTASSIUM SERPL-SCNC: 5.7 MMOL/L
PROT SERPL-MCNC: 7.6 G/DL
SODIUM SERPL-SCNC: 146 MMOL/L
URATE SERPL-MCNC: 7.9 MG/DL

## 2024-12-22 PROBLEM — E87.5 HYPERKALEMIA: Status: ACTIVE | Noted: 2024-12-22

## 2024-12-28 ENCOUNTER — APPOINTMENT (OUTPATIENT)
Dept: INFUSION THERAPY | Facility: HOSPITAL | Age: 74
End: 2024-12-28

## 2024-12-28 ENCOUNTER — APPOINTMENT (OUTPATIENT)
Dept: HEMATOLOGY ONCOLOGY | Facility: CLINIC | Age: 74
End: 2024-12-28

## 2025-03-04 ENCOUNTER — OUTPATIENT (OUTPATIENT)
Dept: OUTPATIENT SERVICES | Facility: HOSPITAL | Age: 75
LOS: 1 days | Discharge: HOME | End: 2025-03-04

## 2025-03-04 DIAGNOSIS — Z95.0 PRESENCE OF CARDIAC PACEMAKER: Chronic | ICD-10-CM

## 2025-03-04 DIAGNOSIS — C85.90 NON-HODGKIN LYMPHOMA, UNSPECIFIED, UNSPECIFIED SITE: Chronic | ICD-10-CM

## 2025-03-04 DIAGNOSIS — C82.20 FOLLICULAR LYMPHOMA GRADE III, UNSPECIFIED, UNSPECIFIED SITE: ICD-10-CM

## 2025-03-05 ENCOUNTER — RESULT REVIEW (OUTPATIENT)
Age: 75
End: 2025-03-05

## 2025-03-05 ENCOUNTER — APPOINTMENT (OUTPATIENT)
Dept: GERIATRICS | Facility: CLINIC | Age: 75
End: 2025-03-05

## 2025-03-05 ENCOUNTER — APPOINTMENT (OUTPATIENT)
Dept: INFUSION THERAPY | Facility: HOSPITAL | Age: 75
End: 2025-03-05

## 2025-03-05 ENCOUNTER — APPOINTMENT (OUTPATIENT)
Dept: HEMATOLOGY ONCOLOGY | Facility: CLINIC | Age: 75
End: 2025-03-05

## 2025-03-05 ENCOUNTER — APPOINTMENT (OUTPATIENT)
Dept: HEMATOLOGY ONCOLOGY | Facility: CLINIC | Age: 75
End: 2025-03-05
Payer: MEDICARE

## 2025-03-05 ENCOUNTER — NON-APPOINTMENT (OUTPATIENT)
Age: 75
End: 2025-03-05

## 2025-03-05 VITALS
HEART RATE: 78 BPM | OXYGEN SATURATION: 98 % | RESPIRATION RATE: 15 BRPM | TEMPERATURE: 97.6 F | DIASTOLIC BLOOD PRESSURE: 79 MMHG | SYSTOLIC BLOOD PRESSURE: 163 MMHG

## 2025-03-05 VITALS
HEART RATE: 87 BPM | SYSTOLIC BLOOD PRESSURE: 178 MMHG | WEIGHT: 171.96 LBS | BODY MASS INDEX: 23.98 KG/M2 | OXYGEN SATURATION: 99 % | DIASTOLIC BLOOD PRESSURE: 78 MMHG | TEMPERATURE: 98.4 F | RESPIRATION RATE: 16 BRPM

## 2025-03-05 DIAGNOSIS — C83.30 DIFFUSE LARGE B-CELL LYMPHOMA, UNSPECIFIED SITE: ICD-10-CM

## 2025-03-05 LAB
BASOPHILS # BLD AUTO: 0.04 K/UL — SIGNIFICANT CHANGE UP (ref 0–0.2)
BASOPHILS NFR BLD AUTO: 0.5 % — SIGNIFICANT CHANGE UP (ref 0–2)
EOSINOPHIL # BLD AUTO: 0.25 K/UL — SIGNIFICANT CHANGE UP (ref 0–0.5)
EOSINOPHIL NFR BLD AUTO: 3.1 % — SIGNIFICANT CHANGE UP (ref 0–6)
HCT VFR BLD CALC: 39.5 % — SIGNIFICANT CHANGE UP (ref 39–50)
HGB BLD-MCNC: 13.2 G/DL — SIGNIFICANT CHANGE UP (ref 13–17)
IMM GRANULOCYTES NFR BLD AUTO: 0.6 % — SIGNIFICANT CHANGE UP (ref 0–0.9)
LYMPHOCYTES # BLD AUTO: 0.66 K/UL — LOW (ref 1–3.3)
LYMPHOCYTES # BLD AUTO: 8.2 % — LOW (ref 13–44)
MCHC RBC-ENTMCNC: 32.8 PG — SIGNIFICANT CHANGE UP (ref 27–34)
MCHC RBC-ENTMCNC: 33.4 G/DL — SIGNIFICANT CHANGE UP (ref 32–36)
MCV RBC AUTO: 98.3 FL — SIGNIFICANT CHANGE UP (ref 80–100)
MONOCYTES # BLD AUTO: 0.55 K/UL — SIGNIFICANT CHANGE UP (ref 0–0.9)
MONOCYTES NFR BLD AUTO: 6.8 % — SIGNIFICANT CHANGE UP (ref 2–14)
NEUTROPHILS # BLD AUTO: 6.51 K/UL — SIGNIFICANT CHANGE UP (ref 1.8–7.4)
NEUTROPHILS NFR BLD AUTO: 80.8 % — HIGH (ref 43–77)
NRBC BLD AUTO-RTO: 0 /100 WBCS — SIGNIFICANT CHANGE UP (ref 0–0)
PLATELET # BLD AUTO: 136 K/UL — LOW (ref 150–400)
RBC # BLD: 4.02 M/UL — LOW (ref 4.2–5.8)
RBC # FLD: 14 % — SIGNIFICANT CHANGE UP (ref 10.3–14.5)
WBC # BLD: 8.06 K/UL — SIGNIFICANT CHANGE UP (ref 3.8–10.5)
WBC # FLD AUTO: 8.06 K/UL — SIGNIFICANT CHANGE UP (ref 3.8–10.5)

## 2025-03-05 PROCEDURE — G2211 COMPLEX E/M VISIT ADD ON: CPT | Mod: NC

## 2025-03-05 PROCEDURE — 99204 OFFICE O/P NEW MOD 45 MIN: CPT

## 2025-03-05 PROCEDURE — G2211 COMPLEX E/M VISIT ADD ON: CPT

## 2025-03-05 PROCEDURE — 99213 OFFICE O/P EST LOW 20 MIN: CPT

## 2025-03-05 RX ORDER — LISINOPRIL 10 MG/1
10 TABLET ORAL
Qty: 30 | Refills: 3 | Status: ACTIVE | COMMUNITY
Start: 2025-03-05 | End: 1900-01-01

## 2025-03-06 ENCOUNTER — APPOINTMENT (OUTPATIENT)
Dept: ELECTROPHYSIOLOGY | Facility: CLINIC | Age: 75
End: 2025-03-06
Payer: MEDICARE

## 2025-03-06 ENCOUNTER — NON-APPOINTMENT (OUTPATIENT)
Age: 75
End: 2025-03-06

## 2025-03-06 DIAGNOSIS — C85.10 UNSPECIFIED B-CELL LYMPHOMA, UNSPECIFIED SITE: ICD-10-CM

## 2025-03-06 LAB
ALBUMIN SERPL ELPH-MCNC: 4.7 G/DL
ALP BLD-CCNC: 141 U/L
ALT SERPL-CCNC: 10 U/L
ANION GAP SERPL CALC-SCNC: 12 MMOL/L
AST SERPL-CCNC: 15 U/L
B2 MICROGLOB SERPL-MCNC: 4.7 MG/L
BILIRUB SERPL-MCNC: 0.4 MG/DL
BUN SERPL-MCNC: 22 MG/DL
CALCIUM SERPL-MCNC: 9.2 MG/DL
CHLORIDE SERPL-SCNC: 106 MMOL/L
CHOLEST SERPL-MCNC: 124 MG/DL — SIGNIFICANT CHANGE UP
CO2 SERPL-SCNC: 29 MMOL/L
CREAT SERPL-MCNC: 1.5 MG/DL
EGFRCR SERPLBLD CKD-EPI 2021: 49 ML/MIN/1.73M2
GLUCOSE SERPL-MCNC: 145 MG/DL
HDLC SERPL-MCNC: 33 MG/DL — LOW
LDH SERPL-CCNC: 214 U/L
LDLC SERPL-MCNC: 66 MG/DL — SIGNIFICANT CHANGE UP
LIPID PNL WITH DIRECT LDL SERPL: 66 MG/DL — SIGNIFICANT CHANGE UP
MAGNESIUM SERPL-MCNC: 1.9 MG/DL — SIGNIFICANT CHANGE UP (ref 1.6–2.6)
NONHDLC SERPL-MCNC: 92 MG/DL — SIGNIFICANT CHANGE UP
PHOSPHATE SERPL-MCNC: 3.1 MG/DL — SIGNIFICANT CHANGE UP (ref 2.5–4.5)
POTASSIUM SERPL-SCNC: 4.3 MMOL/L
PROT SERPL-MCNC: 7.4 G/DL
SODIUM SERPL-SCNC: 148 MMOL/L
TRIGL SERPL-MCNC: 143 MG/DL — SIGNIFICANT CHANGE UP
URATE SERPL-MCNC: 7.7 MG/DL

## 2025-03-06 PROCEDURE — 93294 REM INTERROG EVL PM/LDLS PM: CPT

## 2025-03-06 PROCEDURE — 93296 REM INTERROG EVL PM/IDS: CPT

## 2025-03-14 NOTE — PROGRESS NOTE ADULT - AGENT'S NAME
Problem: Neurological Deficit  Goal: Neurological status is stable or improving  Description: Interventions:  - Monitor and assess patient's level of consciousness, motor function, sensory function, and level of assistance needed for ADLs.   - Monitor and report changes from baseline. Collaborate with interdisciplinary team to initiate plan and implement interventions as ordered.   - Provide and maintain a safe environment.  - Consider seizure precautions.  - Consider fall precautions.  - Consider aspiration precautions.  - Consider bleeding precautions.  Outcome: Progressing     Problem: Activity Intolerance/Impaired Mobility  Goal: Mobility/activity is maintained at optimum level for patient  Description: Interventions:  - Assess and monitor patient  barriers to mobility and need for assistive/adaptive devices.  - Assess patient's emotional response to limitations.  - Collaborate with interdisciplinary team and initiate plans and interventions as ordered.  - Encourage independent activity per ability.  - Maintain proper body alignment.  - Perform active/passive rom as tolerated/ordered.  - Plan activities to conserve energy.  - Turn patient as appropriate  Outcome: Progressing     Problem: Communication Impairment  Goal: Ability to express needs and understand communication  Description: Assess patient's communication skills and ability to understand information.  Patient will demonstrate use of effective communication techniques, alternative methods of communication and understanding even if not able to speak.     - Encourage communication and provide alternate methods of communication as needed.  - Collaborate with case management/ for discharge needs.  - Include patient/family/caregiver in decisions related to communication.  Outcome: Progressing     Problem: Potential for Aspiration  Goal: Non-ventilated patient's risk of aspiration is minimized  Description: Assess and monitor vital signs,  respiratory status, and labs (WBC).  Monitor for signs of aspiration (tachypnea, cough, rales, wheezing, cyanosis, fever).    - Assess and monitor patient's ability to swallow.  - Place patient up in chair to eat if possible.  - HOB up at 90 degrees to eat if unable to get patient up into chair.  - Supervise patient during oral intake.   - Instruct patient/ family to take small bites.  - Instruct patient/ family to take small single sips when taking liquids.  - Follow patient-specific strategies generated by speech pathologist.  Outcome: Progressing  Goal: Ventilated patient's risk of aspiration is minimized  Description: Assess and monitor vital signs, respiratory status, airway cuff pressure, and labs (WBC).  Monitor for signs of aspiration (tachypnea, cough, rales, wheezing, cyanosis, fever).    - Elevate head of bed 30 degrees if patient has tube feeding.  - Monitor tube feeding.  Outcome: Progressing     Problem: Nutrition  Goal: Nutrition/Hydration status is improving  Description: Monitor and assess patient's nutrition/hydration status for malnutrition (ex- brittle hair, bruises, dry skin, pale skin and conjunctiva, muscle wasting, smooth red tongue, and disorientation). Collaborate with interdisciplinary team and initiate plan and interventions as ordered.  Monitor patient's weight and dietary intake as ordered or per policy. Utilize nutrition screening tool and intervene per policy. Determine patient's food preferences and provide high-protein, high-caloric foods as appropriate.     - Assist patient with eating.  - Allow adequate time for meals.  - Encourage patient to take dietary supplement as ordered.  - Collaborate with clinical nutritionist.  - Include patient/family/caregiver in decisions related to nutrition.  Outcome: Progressing     Problem: Prexisting or High Potential for Compromised Skin Integrity  Goal: Skin integrity is maintained or improved  Description: INTERVENTIONS:  - Identify patients  at risk for skin breakdown  - Assess and monitor skin integrity  - Assess and monitor nutrition and hydration status  - Monitor labs   - Assess for incontinence   - Turn and reposition patient  - Assist with mobility/ambulation  - Relieve pressure over bony prominences  - Avoid friction and shearing  - Provide appropriate hygiene as needed including keeping skin clean and dry  - Evaluate need for skin moisturizer/barrier cream  - Collaborate with interdisciplinary team   - Patient/family teaching  - Consider wound care consult   Outcome: Progressing      Vonnie Skaggs

## 2025-03-21 ENCOUNTER — APPOINTMENT (OUTPATIENT)
Dept: GERIATRICS | Facility: CLINIC | Age: 75
End: 2025-03-21

## 2025-04-10 NOTE — PATIENT PROFILE ADULT - HAS THE PATIENT BEEN ADMITTED FROM SHORT TERM REHAB?
[FreeTextEntry1] : Pt with HTN, HLD, angina in the past. 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 Pt educated on diet and exercise for cholesterol.   23: HDL: 43, T, LDL: 46   pt TG now improved vs past on fenofibrate 145.  pt with dilated aorta and did not get echo done.  Patient denies cp, sob, lightheadedness, dizziness, or syncope. Patient not very active right now due to heat. In the cooler weather patient walks 45 minutes to one hour mostly every day. Patient going to [place] in September until April.   24:  pt was in [place] for winter in [place] and walking 3 miles/ day when warm pt does not walk here in  with issues. pt goes to gym and walks on treadmill with weights, pt denies without any anginal episodes.  echo: LVEF: 60%, DD1, e' sept: 0.07 m/s E/e': 11, LVOT VTI: 25.8 cm, borderline LAE, dilated asc ao: 3.8 cm but nwv  3/24: LDL 43 HDL 41 GFR: 69  A1c: 5.3   10/10/24: 24: HDL: 40, T, LDL: 50, BUN/CR: 27/1.02, GFR: 77, A1C: 5.7 worsening, BNP: 86  : Carotid: less than 50% b/l ica, mild to moderate atherosclerosis  pt poor diet in summer, pt denies cp or sob.  pt walking but not exercising.   4/10/25: 25: HDL: 48, T, LDL: 66, BNP: 46, A1C: 5.5 improved  Patient states BP at home is usually in 140s  Patient having chest pain about once a month and lasting a few seconds. Patient feels tired when going up the stairs. The patient denies CP, bleeding, SOB at rest, PND, abdominal discomfort, LH/dizziness, BLE edema, palpitations, and syncope.  no

## 2025-04-14 ENCOUNTER — APPOINTMENT (OUTPATIENT)
Dept: CARDIOLOGY | Facility: CLINIC | Age: 75
End: 2025-04-14

## 2025-04-14 ENCOUNTER — NON-APPOINTMENT (OUTPATIENT)
Age: 75
End: 2025-04-14

## 2025-04-14 VITALS
SYSTOLIC BLOOD PRESSURE: 168 MMHG | WEIGHT: 164 LBS | HEART RATE: 70 BPM | BODY MASS INDEX: 22.96 KG/M2 | DIASTOLIC BLOOD PRESSURE: 72 MMHG | OXYGEN SATURATION: 99 % | HEIGHT: 71 IN

## 2025-04-14 VITALS — SYSTOLIC BLOOD PRESSURE: 145 MMHG | HEART RATE: 70 BPM | DIASTOLIC BLOOD PRESSURE: 65 MMHG

## 2025-04-14 DIAGNOSIS — I25.10 ATHEROSCLEROTIC HEART DISEASE OF NATIVE CORONARY ARTERY W/OUT ANGINA PECTORIS: ICD-10-CM

## 2025-04-14 DIAGNOSIS — I45.9 CONDUCTION DISORDER, UNSPECIFIED: ICD-10-CM

## 2025-04-14 DIAGNOSIS — I43 HYPERTENSIVE HEART DISEASE WITH HEART FAILURE: ICD-10-CM

## 2025-04-14 DIAGNOSIS — I48.92 UNSPECIFIED ATRIAL FLUTTER: ICD-10-CM

## 2025-04-14 DIAGNOSIS — I50.22 CHRONIC SYSTOLIC (CONGESTIVE) HEART FAILURE: ICD-10-CM

## 2025-04-14 DIAGNOSIS — I11.0 HYPERTENSIVE HEART DISEASE WITH HEART FAILURE: ICD-10-CM

## 2025-04-14 PROCEDURE — 93000 ELECTROCARDIOGRAM COMPLETE: CPT

## 2025-04-14 PROCEDURE — 99214 OFFICE O/P EST MOD 30 MIN: CPT | Mod: 25

## 2025-04-15 RX ORDER — CARVEDILOL 12.5 MG/1
12.5 TABLET, FILM COATED ORAL
Qty: 180 | Refills: 3 | Status: ACTIVE | COMMUNITY
Start: 2025-04-14 | End: 1900-01-01

## 2025-04-18 NOTE — DISCHARGE NOTE PROVIDER - NSRESEARCHGRANT_MLMHIDDEN_GEN_A_CORE
Pt chart reviewed. Per LOV note on 3/17/25 \"continue Plavix 75mg daily and rosuvastatin 10mg daily\"    Rx sent to pt's preferred pharmacy. Next scheduled appt on 9/15/25.   
yes

## 2025-05-05 ENCOUNTER — OUTPATIENT (OUTPATIENT)
Dept: OUTPATIENT SERVICES | Facility: HOSPITAL | Age: 75
LOS: 1 days | Discharge: HOME | End: 2025-05-05

## 2025-05-05 DIAGNOSIS — C82.20 FOLLICULAR LYMPHOMA GRADE III, UNSPECIFIED, UNSPECIFIED SITE: ICD-10-CM

## 2025-05-05 DIAGNOSIS — Z95.0 PRESENCE OF CARDIAC PACEMAKER: Chronic | ICD-10-CM

## 2025-05-05 DIAGNOSIS — C85.90 NON-HODGKIN LYMPHOMA, UNSPECIFIED, UNSPECIFIED SITE: Chronic | ICD-10-CM

## 2025-05-07 ENCOUNTER — RESULT REVIEW (OUTPATIENT)
Age: 75
End: 2025-05-07

## 2025-05-07 ENCOUNTER — APPOINTMENT (OUTPATIENT)
Dept: HEMATOLOGY ONCOLOGY | Facility: CLINIC | Age: 75
End: 2025-05-07
Payer: MEDICARE

## 2025-05-07 ENCOUNTER — NON-APPOINTMENT (OUTPATIENT)
Age: 75
End: 2025-05-07

## 2025-05-07 VITALS
TEMPERATURE: 97.8 F | RESPIRATION RATE: 16 BRPM | OXYGEN SATURATION: 99 % | WEIGHT: 170.18 LBS | DIASTOLIC BLOOD PRESSURE: 72 MMHG | BODY MASS INDEX: 23.82 KG/M2 | SYSTOLIC BLOOD PRESSURE: 131 MMHG | HEIGHT: 71 IN | HEART RATE: 81 BPM

## 2025-05-07 DIAGNOSIS — C83.30 DIFFUSE LARGE B-CELL LYMPHOMA, UNSPECIFIED SITE: ICD-10-CM

## 2025-05-07 LAB
BASOPHILS # BLD AUTO: 0.08 K/UL — SIGNIFICANT CHANGE UP (ref 0–0.2)
BASOPHILS NFR BLD AUTO: 1.2 % — SIGNIFICANT CHANGE UP (ref 0–2)
EOSINOPHIL # BLD AUTO: 0.31 K/UL — SIGNIFICANT CHANGE UP (ref 0–0.5)
EOSINOPHIL NFR BLD AUTO: 4.5 % — SIGNIFICANT CHANGE UP (ref 0–6)
HCT VFR BLD CALC: 42.4 % — SIGNIFICANT CHANGE UP (ref 39–50)
HGB BLD-MCNC: 14 G/DL — SIGNIFICANT CHANGE UP (ref 13–17)
IMM GRANULOCYTES NFR BLD AUTO: 0.6 % — SIGNIFICANT CHANGE UP (ref 0–0.9)
LYMPHOCYTES # BLD AUTO: 0.73 K/UL — LOW (ref 1–3.3)
LYMPHOCYTES # BLD AUTO: 10.5 % — LOW (ref 13–44)
MCHC RBC-ENTMCNC: 32.3 PG — SIGNIFICANT CHANGE UP (ref 27–34)
MCHC RBC-ENTMCNC: 33 G/DL — SIGNIFICANT CHANGE UP (ref 32–36)
MCV RBC AUTO: 97.9 FL — SIGNIFICANT CHANGE UP (ref 80–100)
MONOCYTES # BLD AUTO: 0.49 K/UL — SIGNIFICANT CHANGE UP (ref 0–0.9)
MONOCYTES NFR BLD AUTO: 7.1 % — SIGNIFICANT CHANGE UP (ref 2–14)
NEUTROPHILS # BLD AUTO: 5.3 K/UL — SIGNIFICANT CHANGE UP (ref 1.8–7.4)
NEUTROPHILS NFR BLD AUTO: 76.1 % — SIGNIFICANT CHANGE UP (ref 43–77)
NRBC BLD AUTO-RTO: 0 /100 WBCS — SIGNIFICANT CHANGE UP (ref 0–0)
PLATELET # BLD AUTO: 129 K/UL — LOW (ref 150–400)
RBC # BLD: 4.33 M/UL — SIGNIFICANT CHANGE UP (ref 4.2–5.8)
RBC # FLD: 13.3 % — SIGNIFICANT CHANGE UP (ref 10.3–14.5)
WBC # BLD: 6.95 K/UL — SIGNIFICANT CHANGE UP (ref 3.8–10.5)
WBC # FLD AUTO: 6.95 K/UL — SIGNIFICANT CHANGE UP (ref 3.8–10.5)

## 2025-05-07 PROCEDURE — G2211 COMPLEX E/M VISIT ADD ON: CPT

## 2025-05-07 PROCEDURE — 99214 OFFICE O/P EST MOD 30 MIN: CPT

## 2025-05-08 LAB
ALBUMIN SERPL ELPH-MCNC: 4.6 G/DL
ALP BLD-CCNC: 137 U/L
ALT SERPL-CCNC: 20 U/L
ANION GAP SERPL CALC-SCNC: 17 MMOL/L
AST SERPL-CCNC: 26 U/L
B2 MICROGLOB SERPL-MCNC: 5.2 MG/L
BILIRUB SERPL-MCNC: 0.4 MG/DL
BUN SERPL-MCNC: 26 MG/DL
CALCIUM SERPL-MCNC: 9.2 MG/DL
CHLORIDE SERPL-SCNC: 104 MMOL/L
CO2 SERPL-SCNC: 25 MMOL/L
CREAT SERPL-MCNC: 1.59 MG/DL
EGFRCR SERPLBLD CKD-EPI 2021: 46 ML/MIN/1.73M2
GLUCOSE SERPL-MCNC: 91 MG/DL
LDH SERPL-CCNC: 256 U/L
POTASSIUM SERPL-SCNC: 4.3 MMOL/L
PROT SERPL-MCNC: 7.8 G/DL
SODIUM SERPL-SCNC: 145 MMOL/L
URATE SERPL-MCNC: 8 MG/DL

## 2025-05-12 ENCOUNTER — APPOINTMENT (OUTPATIENT)
Dept: CARDIOLOGY | Facility: CLINIC | Age: 75
End: 2025-05-12

## 2025-06-02 ENCOUNTER — APPOINTMENT (OUTPATIENT)
Dept: NUCLEAR MEDICINE | Facility: IMAGING CENTER | Age: 75
End: 2025-06-02
Payer: MEDICARE

## 2025-06-02 ENCOUNTER — OUTPATIENT (OUTPATIENT)
Dept: OUTPATIENT SERVICES | Facility: HOSPITAL | Age: 75
LOS: 1 days | End: 2025-06-02
Payer: MEDICARE

## 2025-06-02 ENCOUNTER — APPOINTMENT (OUTPATIENT)
Dept: CARDIOLOGY | Facility: CLINIC | Age: 75
End: 2025-06-02

## 2025-06-02 ENCOUNTER — NON-APPOINTMENT (OUTPATIENT)
Age: 75
End: 2025-06-02

## 2025-06-02 VITALS
BODY MASS INDEX: 23.8 KG/M2 | WEIGHT: 170 LBS | DIASTOLIC BLOOD PRESSURE: 78 MMHG | HEIGHT: 71 IN | SYSTOLIC BLOOD PRESSURE: 160 MMHG | HEART RATE: 64 BPM

## 2025-06-02 DIAGNOSIS — I25.10 ATHEROSCLEROTIC HEART DISEASE OF NATIVE CORONARY ARTERY W/OUT ANGINA PECTORIS: ICD-10-CM

## 2025-06-02 DIAGNOSIS — I11.0 HYPERTENSIVE HEART DISEASE WITH HEART FAILURE: ICD-10-CM

## 2025-06-02 DIAGNOSIS — C83.30 DIFFUSE LARGE B-CELL LYMPHOMA, UNSPECIFIED SITE: ICD-10-CM

## 2025-06-02 DIAGNOSIS — I43 HYPERTENSIVE HEART DISEASE WITH HEART FAILURE: ICD-10-CM

## 2025-06-02 DIAGNOSIS — I45.9 CONDUCTION DISORDER, UNSPECIFIED: ICD-10-CM

## 2025-06-02 DIAGNOSIS — C85.90 NON-HODGKIN LYMPHOMA, UNSPECIFIED, UNSPECIFIED SITE: Chronic | ICD-10-CM

## 2025-06-02 DIAGNOSIS — I50.22 CHRONIC SYSTOLIC (CONGESTIVE) HEART FAILURE: ICD-10-CM

## 2025-06-02 DIAGNOSIS — Z95.0 PRESENCE OF CARDIAC PACEMAKER: Chronic | ICD-10-CM

## 2025-06-02 DIAGNOSIS — I48.92 UNSPECIFIED ATRIAL FLUTTER: ICD-10-CM

## 2025-06-02 PROCEDURE — 78815 PET IMAGE W/CT SKULL-THIGH: CPT | Mod: 26,PS

## 2025-06-02 PROCEDURE — G2211 COMPLEX E/M VISIT ADD ON: CPT

## 2025-06-02 PROCEDURE — 99214 OFFICE O/P EST MOD 30 MIN: CPT

## 2025-06-02 PROCEDURE — A9552: CPT

## 2025-06-02 PROCEDURE — 93000 ELECTROCARDIOGRAM COMPLETE: CPT

## 2025-06-02 PROCEDURE — 78815 PET IMAGE W/CT SKULL-THIGH: CPT

## 2025-06-09 ENCOUNTER — APPOINTMENT (OUTPATIENT)
Dept: ELECTROPHYSIOLOGY | Facility: CLINIC | Age: 75
End: 2025-06-09
Payer: MEDICARE

## 2025-06-09 ENCOUNTER — NON-APPOINTMENT (OUTPATIENT)
Age: 75
End: 2025-06-09

## 2025-06-09 PROCEDURE — 93294 REM INTERROG EVL PM/LDLS PM: CPT

## 2025-06-09 PROCEDURE — 93296 REM INTERROG EVL PM/IDS: CPT

## 2025-07-02 ENCOUNTER — RESULT REVIEW (OUTPATIENT)
Age: 75
End: 2025-07-02

## 2025-07-02 ENCOUNTER — APPOINTMENT (OUTPATIENT)
Dept: HEMATOLOGY ONCOLOGY | Facility: CLINIC | Age: 75
End: 2025-07-02

## 2025-07-02 ENCOUNTER — APPOINTMENT (OUTPATIENT)
Dept: HEMATOLOGY ONCOLOGY | Facility: CLINIC | Age: 75
End: 2025-07-02
Payer: MEDICARE

## 2025-07-02 ENCOUNTER — APPOINTMENT (OUTPATIENT)
Dept: INFUSION THERAPY | Facility: HOSPITAL | Age: 75
End: 2025-07-02

## 2025-07-02 VITALS
HEART RATE: 71 BPM | WEIGHT: 170.86 LBS | SYSTOLIC BLOOD PRESSURE: 157 MMHG | BODY MASS INDEX: 23.83 KG/M2 | RESPIRATION RATE: 16 BRPM | OXYGEN SATURATION: 98 % | DIASTOLIC BLOOD PRESSURE: 75 MMHG | TEMPERATURE: 97.3 F

## 2025-07-02 PROCEDURE — 99214 OFFICE O/P EST MOD 30 MIN: CPT

## 2025-07-02 PROCEDURE — G2211 COMPLEX E/M VISIT ADD ON: CPT

## 2025-07-03 DIAGNOSIS — C85.10 UNSPECIFIED B-CELL LYMPHOMA, UNSPECIFIED SITE: ICD-10-CM

## 2025-07-03 LAB
ALBUMIN SERPL ELPH-MCNC: 4.4 G/DL — SIGNIFICANT CHANGE UP (ref 3.3–5)
ALP SERPL-CCNC: 125 U/L — HIGH (ref 40–120)
ALT FLD-CCNC: 20 U/L — SIGNIFICANT CHANGE UP (ref 10–50)
ANION GAP SERPL CALC-SCNC: 15 MMOL/L — SIGNIFICANT CHANGE UP (ref 5–17)
AST SERPL-CCNC: 25 U/L — SIGNIFICANT CHANGE UP (ref 10–40)
B2 MICROGLOB SERPL-MCNC: 5.4 MG/L — HIGH (ref 0.8–2.2)
BASOPHILS # BLD AUTO: 0.06 K/UL — SIGNIFICANT CHANGE UP (ref 0–0.2)
BASOPHILS NFR BLD AUTO: 0.8 % — SIGNIFICANT CHANGE UP (ref 0–2)
BILIRUB SERPL-MCNC: 0.5 MG/DL — SIGNIFICANT CHANGE UP (ref 0.2–1.2)
BUN SERPL-MCNC: 31 MG/DL — HIGH (ref 7–23)
CALCIUM SERPL-MCNC: 9.5 MG/DL — SIGNIFICANT CHANGE UP (ref 8.4–10.5)
CHLORIDE SERPL-SCNC: 103 MMOL/L — SIGNIFICANT CHANGE UP (ref 96–108)
CO2 SERPL-SCNC: 23 MMOL/L — SIGNIFICANT CHANGE UP (ref 22–31)
CREAT SERPL-MCNC: 1.77 MG/DL — HIGH (ref 0.5–1.3)
EGFR: 40 ML/MIN/1.73M2 — LOW
EGFR: 40 ML/MIN/1.73M2 — LOW
EOSINOPHIL # BLD AUTO: 0.3 K/UL — SIGNIFICANT CHANGE UP (ref 0–0.5)
EOSINOPHIL NFR BLD AUTO: 4.1 % — SIGNIFICANT CHANGE UP (ref 0–6)
GLUCOSE SERPL-MCNC: 103 MG/DL — HIGH (ref 70–99)
HCT VFR BLD CALC: 40.6 % — SIGNIFICANT CHANGE UP (ref 39–50)
HGB BLD-MCNC: 12.8 G/DL — LOW (ref 13–17)
IMM GRANULOCYTES NFR BLD AUTO: 0.8 % — SIGNIFICANT CHANGE UP (ref 0–0.9)
LDH SERPL L TO P-CCNC: 294 U/L — HIGH (ref 50–242)
LYMPHOCYTES # BLD AUTO: 0.73 K/UL — LOW (ref 1–3.3)
LYMPHOCYTES # BLD AUTO: 9.9 % — LOW (ref 13–44)
MCHC RBC-ENTMCNC: 31.5 G/DL — LOW (ref 32–36)
MCHC RBC-ENTMCNC: 32.3 PG — SIGNIFICANT CHANGE UP (ref 27–34)
MCV RBC AUTO: 102.5 FL — HIGH (ref 80–100)
MONOCYTES # BLD AUTO: 0.51 K/UL — SIGNIFICANT CHANGE UP (ref 0–0.9)
MONOCYTES NFR BLD AUTO: 6.9 % — SIGNIFICANT CHANGE UP (ref 2–14)
NEUTROPHILS # BLD AUTO: 5.73 K/UL — SIGNIFICANT CHANGE UP (ref 1.8–7.4)
NEUTROPHILS NFR BLD AUTO: 77.5 % — HIGH (ref 43–77)
PLATELET # BLD AUTO: 125 K/UL — LOW (ref 150–400)
POTASSIUM SERPL-MCNC: 5 MMOL/L — SIGNIFICANT CHANGE UP (ref 3.5–5.3)
POTASSIUM SERPL-SCNC: 5 MMOL/L — SIGNIFICANT CHANGE UP (ref 3.5–5.3)
PROT SERPL-MCNC: 7.7 G/DL — SIGNIFICANT CHANGE UP (ref 6–8.3)
RBC # BLD: 3.96 M/UL — LOW (ref 4.2–5.8)
RBC # FLD: 13.8 % — SIGNIFICANT CHANGE UP (ref 10.3–14.5)
SODIUM SERPL-SCNC: 141 MMOL/L — SIGNIFICANT CHANGE UP (ref 135–145)
URATE SERPL-MCNC: 7.5 MG/DL — SIGNIFICANT CHANGE UP (ref 3.4–8.8)
WBC # BLD: 7.39 K/UL — SIGNIFICANT CHANGE UP (ref 3.8–10.5)
WBC # FLD AUTO: 7.39 K/UL — SIGNIFICANT CHANGE UP (ref 3.8–10.5)

## 2025-07-28 ENCOUNTER — APPOINTMENT (OUTPATIENT)
Dept: GERIATRICS | Facility: CLINIC | Age: 75
End: 2025-07-28
Payer: MEDICARE

## 2025-07-28 VITALS
OXYGEN SATURATION: 98 % | WEIGHT: 167 LBS | DIASTOLIC BLOOD PRESSURE: 67 MMHG | RESPIRATION RATE: 13 BRPM | SYSTOLIC BLOOD PRESSURE: 125 MMHG | BODY MASS INDEX: 23.29 KG/M2 | TEMPERATURE: 97.5 F | HEART RATE: 69 BPM

## 2025-07-28 DIAGNOSIS — C82.20 FOLLICULAR LYMPHOMA GRADE III, UNSPECIFIED, UNSPECIFIED SITE: ICD-10-CM

## 2025-07-28 DIAGNOSIS — E79.0 HYPERURICEMIA W/OUT SIGNS OF INFLAMMATORY ARTHRITIS AND TOPHACEOUS DISEASE: ICD-10-CM

## 2025-07-28 DIAGNOSIS — Z71.89 OTHER SPECIFIED COUNSELING: ICD-10-CM

## 2025-07-28 DIAGNOSIS — F03.90 UNSPECIFIED DEMENTIA W/OUT BEHAVIORAL DISTURBANCE: ICD-10-CM

## 2025-07-28 DIAGNOSIS — I10 ESSENTIAL (PRIMARY) HYPERTENSION: ICD-10-CM

## 2025-07-28 PROCEDURE — 99214 OFFICE O/P EST MOD 30 MIN: CPT

## 2025-08-06 ENCOUNTER — OFFICE (OUTPATIENT)
Facility: LOCATION | Age: 75
Setting detail: OPHTHALMOLOGY
End: 2025-08-06
Payer: MEDICARE

## 2025-08-06 DIAGNOSIS — H01.004: ICD-10-CM

## 2025-08-06 DIAGNOSIS — H35.362: ICD-10-CM

## 2025-08-06 DIAGNOSIS — H25.13: ICD-10-CM

## 2025-08-06 DIAGNOSIS — H01.002: ICD-10-CM

## 2025-08-06 DIAGNOSIS — H02.831: ICD-10-CM

## 2025-08-06 DIAGNOSIS — H40.013: ICD-10-CM

## 2025-08-06 DIAGNOSIS — H02.834: ICD-10-CM

## 2025-08-06 DIAGNOSIS — H01.001: ICD-10-CM

## 2025-08-06 DIAGNOSIS — H01.005: ICD-10-CM

## 2025-08-06 PROCEDURE — 92014 COMPRE OPH EXAM EST PT 1/>: CPT | Performed by: OPHTHALMOLOGY

## 2025-08-06 ASSESSMENT — LID POSITION - DERMATOCHALASIS
OS_DERMATOCHALASIS: LUL 2+ 3+
OD_DERMATOCHALASIS: RUL 2+ 3+

## 2025-08-06 ASSESSMENT — VISUAL ACUITY
OD_BCVA: 20/30+2
OS_BCVA: 20/50-1

## 2025-08-06 ASSESSMENT — REFRACTION_CURRENTRX
OS_CYLINDER: SPH
OD_SPHERE: +2.75
OD_VPRISM_DIRECTION: SV
OS_SPHERE: +2.75
OS_OVR_VA: 20/
OD_OVR_VA: 20/
OS_VPRISM_DIRECTION: SV
OD_CYLINDER: SPH

## 2025-08-06 ASSESSMENT — LID EXAM ASSESSMENTS
OS_BLEPHARITIS: LLL LUL 2+
OD_BLEPHARITIS: RLL RUL 2+

## 2025-08-06 ASSESSMENT — REFRACTION_AUTOREFRACTION
OS_CYLINDER: -1.00
OD_AXIS: 096
OD_SPHERE: +2.50
OS_SPHERE: +1.75
OD_CYLINDER: -0.75
OS_AXIS: 119

## 2025-08-06 ASSESSMENT — KERATOMETRY
METHOD_AUTO_MANUAL: AUTO
OS_AXISANGLE_DEGREES: 043
OD_AXISANGLE_DEGREES: 162
OD_K2POWER_DIOPTERS: 44.25
OD_K1POWER_DIOPTERS: 43.50
OS_K1POWER_DIOPTERS: 43.50
OS_K2POWER_DIOPTERS: 44.00

## 2025-08-06 ASSESSMENT — TONOMETRY
OS_IOP_MMHG: 15
OD_IOP_MMHG: 16

## 2025-08-06 ASSESSMENT — CONFRONTATIONAL VISUAL FIELD TEST (CVF)
OD_FINDINGS: FULL
OS_FINDINGS: FULL

## 2025-09-08 ENCOUNTER — APPOINTMENT (OUTPATIENT)
Dept: ELECTROPHYSIOLOGY | Facility: CLINIC | Age: 75
End: 2025-09-08
Payer: MEDICARE

## 2025-09-08 ENCOUNTER — NON-APPOINTMENT (OUTPATIENT)
Age: 75
End: 2025-09-08

## 2025-09-08 ENCOUNTER — APPOINTMENT (OUTPATIENT)
Dept: CARDIOLOGY | Facility: CLINIC | Age: 75
End: 2025-09-08
Payer: MEDICARE

## 2025-09-08 VITALS
WEIGHT: 168 LBS | DIASTOLIC BLOOD PRESSURE: 77 MMHG | OXYGEN SATURATION: 97 % | HEART RATE: 72 BPM | HEIGHT: 71 IN | SYSTOLIC BLOOD PRESSURE: 124 MMHG | BODY MASS INDEX: 23.52 KG/M2

## 2025-09-08 DIAGNOSIS — I50.22 CHRONIC SYSTOLIC (CONGESTIVE) HEART FAILURE: ICD-10-CM

## 2025-09-08 DIAGNOSIS — I11.0 HYPERTENSIVE HEART DISEASE WITH HEART FAILURE: ICD-10-CM

## 2025-09-08 DIAGNOSIS — I45.9 CONDUCTION DISORDER, UNSPECIFIED: ICD-10-CM

## 2025-09-08 DIAGNOSIS — I43 HYPERTENSIVE HEART DISEASE WITH HEART FAILURE: ICD-10-CM

## 2025-09-08 DIAGNOSIS — I48.92 UNSPECIFIED ATRIAL FLUTTER: ICD-10-CM

## 2025-09-08 DIAGNOSIS — I25.10 ATHEROSCLEROTIC HEART DISEASE OF NATIVE CORONARY ARTERY W/OUT ANGINA PECTORIS: ICD-10-CM

## 2025-09-08 DIAGNOSIS — I10 ESSENTIAL (PRIMARY) HYPERTENSION: ICD-10-CM

## 2025-09-08 PROCEDURE — 99214 OFFICE O/P EST MOD 30 MIN: CPT

## 2025-09-08 PROCEDURE — 93000 ELECTROCARDIOGRAM COMPLETE: CPT | Mod: XU

## 2025-09-08 PROCEDURE — G2211 COMPLEX E/M VISIT ADD ON: CPT

## 2025-09-08 PROCEDURE — 93294 REM INTERROG EVL PM/LDLS PM: CPT

## 2025-09-08 PROCEDURE — 93296 REM INTERROG EVL PM/IDS: CPT

## (undated) DEVICE — STAPLER SKIN VISI-STAT 35 WIDE

## (undated) DEVICE — DRAPE 1/2 SHEET 40X57"

## (undated) DEVICE — GLV 7.5 PROTEXIS (WHITE)

## (undated) DEVICE — BLADE SCALPEL SAFETYLOCK #10

## (undated) DEVICE — PACK BREAST MAJOR

## (undated) DEVICE — DRSG OPSITE 13.75 X 4"

## (undated) DEVICE — DRAPE 3/4 SHEET W REINFORCEMENT 56X77"

## (undated) DEVICE — DRSG KLING 4"

## (undated) DEVICE — MEDICATION LABELS W MARKER

## (undated) DEVICE — GOWN TRIMAX LG

## (undated) DEVICE — SPECIMEN CONTAINER 100ML

## (undated) DEVICE — GLV 8.5 PROTEXIS (WHITE)

## (undated) DEVICE — DRAPE INSTRUMENT POUCH 6.75" X 11"

## (undated) DEVICE — DRAIN RESERVOIR FOR JACKSON PRATT 100CC CARDINAL

## (undated) DEVICE — SOL IRR POUR H2O 250ML

## (undated) DEVICE — DRSG STERISTRIPS 0.5 X 4"

## (undated) DEVICE — FOLEY TRAY 16FR 5CC LTX UMETER CLOSED

## (undated) DEVICE — SOL IRR POUR NS 0.9% 500ML

## (undated) DEVICE — DRAPE TOWEL BLUE 17" X 24"

## (undated) DEVICE — BLADE SCALPEL SAFETYLOCK #15

## (undated) DEVICE — MARKING PEN W RULER

## (undated) DEVICE — CANISTER KCI 500ML GEL SENSA TRAC

## (undated) DEVICE — DRAIN JACKSON PRATT 10MM FLAT FULL NO TROCAR

## (undated) DEVICE — GLV 7 PROTEXIS (WHITE)

## (undated) DEVICE — VENODYNE/SCD SLEEVE CALF LARGE

## (undated) DEVICE — GLV 6.5 PROTEXIS (WHITE)

## (undated) DEVICE — DRSG TEGADERM 6"X8"

## (undated) DEVICE — DRAPE MAYO STAND 30"

## (undated) DEVICE — DRSG VAC GRANUFOAM LARGE (BLACK)

## (undated) DEVICE — DRSG CURITY GAUZE SPONGE 4 X 4" 12-PLY

## (undated) DEVICE — GLV 8 PROTEXIS (WHITE)

## (undated) DEVICE — POSITIONER FOAM EGG CRATE ULNAR 2PCS (PINK)

## (undated) DEVICE — PREP CHLORAPREP HI-LITE ORANGE 26ML

## (undated) DEVICE — WARMING BLANKET LOWER ADULT